# Patient Record
Sex: MALE | Race: WHITE | NOT HISPANIC OR LATINO | Employment: FULL TIME | URBAN - METROPOLITAN AREA
[De-identification: names, ages, dates, MRNs, and addresses within clinical notes are randomized per-mention and may not be internally consistent; named-entity substitution may affect disease eponyms.]

---

## 2017-02-12 ENCOUNTER — APPOINTMENT (EMERGENCY)
Dept: RADIOLOGY | Facility: HOSPITAL | Age: 22
End: 2017-02-12
Payer: COMMERCIAL

## 2017-02-12 ENCOUNTER — HOSPITAL ENCOUNTER (EMERGENCY)
Facility: HOSPITAL | Age: 22
Discharge: HOME | End: 2017-02-12
Attending: EMERGENCY MEDICINE | Admitting: EMERGENCY MEDICINE
Payer: COMMERCIAL

## 2017-02-12 VITALS
DIASTOLIC BLOOD PRESSURE: 71 MMHG | WEIGHT: 175 LBS | BODY MASS INDEX: 25.84 KG/M2 | OXYGEN SATURATION: 98 % | RESPIRATION RATE: 18 BRPM | SYSTOLIC BLOOD PRESSURE: 134 MMHG | TEMPERATURE: 97.8 F | HEART RATE: 94 BPM

## 2017-02-12 DIAGNOSIS — R10.9 ABDOMINAL PAIN: Primary | ICD-10-CM

## 2017-02-12 LAB
ALBUMIN SERPL BCP-MCNC: 3.9 G/DL (ref 3.5–5)
ALP SERPL-CCNC: 111 U/L (ref 46–116)
ALT SERPL W P-5'-P-CCNC: 47 U/L (ref 12–78)
ANION GAP SERPL CALCULATED.3IONS-SCNC: 7 MMOL/L (ref 4–13)
APTT PPP: 31 SECONDS (ref 24–36)
AST SERPL W P-5'-P-CCNC: 20 U/L (ref 5–45)
BASOPHILS # BLD AUTO: 0.11 THOUSAND/UL (ref 0–0.1)
BASOPHILS NFR MAR MANUAL: 1 % (ref 0–1)
BILIRUB SERPL-MCNC: 0.4 MG/DL (ref 0.2–1)
BUN SERPL-MCNC: 13 MG/DL (ref 5–25)
CALCIUM SERPL-MCNC: 8.8 MG/DL (ref 8.3–10.1)
CHLORIDE SERPL-SCNC: 100 MMOL/L (ref 100–108)
CO2 SERPL-SCNC: 29 MMOL/L (ref 21–32)
CREAT SERPL-MCNC: 1.09 MG/DL (ref 0.6–1.3)
EOSINOPHIL # BLD AUTO: 0.54 THOUSAND/UL (ref 0–0.61)
EOSINOPHIL NFR BLD MANUAL: 5 % (ref 0–6)
ERYTHROCYTE [DISTWIDTH] IN BLOOD BY AUTOMATED COUNT: 16.5 % (ref 11.6–15.1)
GFR SERPL CREATININE-BSD FRML MDRD: >60 ML/MIN/1.73SQ M
GLUCOSE SERPL-MCNC: 99 MG/DL (ref 65–140)
HCT VFR BLD AUTO: 42.8 % (ref 42–52)
HGB BLD-MCNC: 13 G/DL (ref 14–18)
HYPERCHROMIA BLD QL SMEAR: PRESENT
INR PPP: 0.98 (ref 0.86–1.16)
LIPASE SERPL-CCNC: 163 U/L (ref 73–393)
LYMPHOCYTES # BLD AUTO: 1.62 THOUSAND/UL (ref 0.6–4.47)
LYMPHOCYTES # BLD AUTO: 15 %
MCH RBC QN AUTO: 18.5 PG (ref 27–31)
MCHC RBC AUTO-ENTMCNC: 30.3 G/DL (ref 31.4–37.4)
MCV RBC AUTO: 61 FL (ref 82–98)
MICROCYTES BLD QL AUTO: PRESENT
MONOCYTES # BLD AUTO: 1.94 THOUSAND/UL (ref 0–1.22)
MONOCYTES NFR BLD AUTO: 18 % (ref 4–12)
NEUTS BAND NFR BLD MANUAL: 12 % (ref 0–8)
NEUTS SEG # BLD: 6.59 THOUSAND/UL (ref 1.81–6.82)
NEUTS SEG NFR BLD AUTO: 49 %
NRBC BLD AUTO-RTO: 0 /100 WBCS
PLATELET # BLD AUTO: 373 THOUSANDS/UL (ref 130–400)
PMV BLD AUTO: 7.2 FL (ref 8.9–12.7)
POIKILOCYTOSIS BLD QL SMEAR: PRESENT
POLYCHROMASIA BLD QL SMEAR: PRESENT
POTASSIUM SERPL-SCNC: 3.9 MMOL/L (ref 3.5–5.3)
PROT SERPL-MCNC: 8 G/DL (ref 6.4–8.2)
PROTHROMBIN TIME: 10.3 SECONDS (ref 9.4–11.7)
RBC # BLD AUTO: 7.01 MILLION/UL (ref 4.7–6.1)
SODIUM SERPL-SCNC: 136 MMOL/L (ref 136–145)
TOTAL CELLS COUNTED SPEC: 100
WBC # BLD AUTO: 10.8 THOUSAND/UL (ref 4.8–10.8)

## 2017-02-12 PROCEDURE — 83690 ASSAY OF LIPASE: CPT | Performed by: EMERGENCY MEDICINE

## 2017-02-12 PROCEDURE — A9270 NON-COVERED ITEM OR SERVICE: HCPCS | Performed by: EMERGENCY MEDICINE

## 2017-02-12 PROCEDURE — 85007 BL SMEAR W/DIFF WBC COUNT: CPT | Performed by: EMERGENCY MEDICINE

## 2017-02-12 PROCEDURE — 96375 TX/PRO/DX INJ NEW DRUG ADDON: CPT

## 2017-02-12 PROCEDURE — 36415 COLL VENOUS BLD VENIPUNCTURE: CPT | Performed by: EMERGENCY MEDICINE

## 2017-02-12 PROCEDURE — 80053 COMPREHEN METABOLIC PANEL: CPT | Performed by: EMERGENCY MEDICINE

## 2017-02-12 PROCEDURE — 99284 EMERGENCY DEPT VISIT MOD MDM: CPT

## 2017-02-12 PROCEDURE — 96361 HYDRATE IV INFUSION ADD-ON: CPT

## 2017-02-12 PROCEDURE — 96376 TX/PRO/DX INJ SAME DRUG ADON: CPT

## 2017-02-12 PROCEDURE — 85027 COMPLETE CBC AUTOMATED: CPT | Performed by: EMERGENCY MEDICINE

## 2017-02-12 PROCEDURE — 85610 PROTHROMBIN TIME: CPT | Performed by: EMERGENCY MEDICINE

## 2017-02-12 PROCEDURE — 85730 THROMBOPLASTIN TIME PARTIAL: CPT | Performed by: EMERGENCY MEDICINE

## 2017-02-12 PROCEDURE — 74177 CT ABD & PELVIS W/CONTRAST: CPT

## 2017-02-12 PROCEDURE — 96374 THER/PROPH/DIAG INJ IV PUSH: CPT

## 2017-02-12 RX ORDER — ONDANSETRON 2 MG/ML
4 INJECTION INTRAMUSCULAR; INTRAVENOUS ONCE
Status: COMPLETED | OUTPATIENT
Start: 2017-02-12 | End: 2017-02-12

## 2017-02-12 RX ORDER — OXYCODONE HYDROCHLORIDE AND ACETAMINOPHEN 5; 325 MG/1; MG/1
1 TABLET ORAL EVERY 6 HOURS PRN
Qty: 10 TABLET | Refills: 0 | Status: SHIPPED | OUTPATIENT
Start: 2017-02-12 | End: 2018-02-02 | Stop reason: ALTCHOICE

## 2017-02-12 RX ORDER — MORPHINE SULFATE 4 MG/ML
4 INJECTION, SOLUTION INTRAMUSCULAR; INTRAVENOUS ONCE
Status: COMPLETED | OUTPATIENT
Start: 2017-02-12 | End: 2017-02-12

## 2017-02-12 RX ADMIN — HYDROMORPHONE HYDROCHLORIDE 1 MG: 1 INJECTION, SOLUTION INTRAMUSCULAR; INTRAVENOUS; SUBCUTANEOUS at 16:01

## 2017-02-12 RX ADMIN — ONDANSETRON 4 MG: 2 INJECTION INTRAMUSCULAR; INTRAVENOUS at 15:18

## 2017-02-12 RX ADMIN — ONDANSETRON 4 MG: 2 INJECTION INTRAMUSCULAR; INTRAVENOUS at 16:01

## 2017-02-12 RX ADMIN — MORPHINE SULFATE 4 MG: 4 INJECTION, SOLUTION INTRAMUSCULAR; INTRAVENOUS at 15:17

## 2017-02-12 RX ADMIN — SODIUM CHLORIDE 1000 ML: 0.9 INJECTION, SOLUTION INTRAVENOUS at 15:19

## 2017-02-12 RX ADMIN — IOHEXOL: 240 INJECTION, SOLUTION INTRATHECAL; INTRAVASCULAR; INTRAVENOUS; ORAL at 15:30

## 2017-02-12 RX ADMIN — IOHEXOL 100 ML: 350 INJECTION, SOLUTION INTRAVENOUS at 17:15

## 2017-02-22 ENCOUNTER — GENERIC CONVERSION - ENCOUNTER (OUTPATIENT)
Dept: OTHER | Facility: OTHER | Age: 22
End: 2017-02-22

## 2017-06-27 ENCOUNTER — ALLSCRIPTS OFFICE VISIT (OUTPATIENT)
Dept: OTHER | Facility: OTHER | Age: 22
End: 2017-06-27

## 2017-06-27 DIAGNOSIS — Z13.6 ENCOUNTER FOR SCREENING FOR CARDIOVASCULAR DISORDERS: ICD-10-CM

## 2017-06-27 DIAGNOSIS — Z13.83 ENCOUNTER FOR SCREENING FOR RESPIRATORY DISORDER: ICD-10-CM

## 2017-06-27 DIAGNOSIS — Z02.1 ENCOUNTER FOR PRE-EMPLOYMENT EXAMINATION: ICD-10-CM

## 2017-06-27 DIAGNOSIS — Z13.89 ENCOUNTER FOR SCREENING FOR OTHER DISORDER: ICD-10-CM

## 2017-06-28 ENCOUNTER — TRANSCRIBE ORDERS (OUTPATIENT)
Dept: ADMINISTRATIVE | Facility: HOSPITAL | Age: 22
End: 2017-06-28

## 2017-06-28 ENCOUNTER — APPOINTMENT (OUTPATIENT)
Dept: LAB | Facility: HOSPITAL | Age: 22
End: 2017-06-28
Attending: FAMILY MEDICINE

## 2017-06-28 DIAGNOSIS — Z02.1 ENCOUNTER FOR PRE-EMPLOYMENT EXAMINATION: ICD-10-CM

## 2017-06-28 DIAGNOSIS — Z13.6 ENCOUNTER FOR SCREENING FOR CARDIOVASCULAR DISORDERS: ICD-10-CM

## 2017-06-28 DIAGNOSIS — Z02.1 PRE-EMPLOYMENT HEALTH SCREENING EXAMINATION: ICD-10-CM

## 2017-06-28 DIAGNOSIS — Z02.1 PRE-EMPLOYMENT HEALTH SCREENING EXAMINATION: Primary | ICD-10-CM

## 2017-06-28 DIAGNOSIS — Z13.83 ENCOUNTER FOR SCREENING FOR RESPIRATORY DISORDER: ICD-10-CM

## 2017-06-28 DIAGNOSIS — Z13.89 ENCOUNTER FOR SCREENING FOR OTHER DISORDER: ICD-10-CM

## 2017-06-28 LAB
ALBUMIN SERPL BCP-MCNC: 3.8 G/DL (ref 3.5–5)
ALP SERPL-CCNC: 76 U/L (ref 46–116)
ALT SERPL W P-5'-P-CCNC: 31 U/L (ref 12–78)
ANION GAP SERPL CALCULATED.3IONS-SCNC: 9 MMOL/L (ref 4–13)
AST SERPL W P-5'-P-CCNC: 20 U/L (ref 5–45)
BACTERIA UR QL AUTO: ABNORMAL /HPF
BILIRUB SERPL-MCNC: 0.8 MG/DL (ref 0.2–1)
BILIRUB UR QL STRIP: NEGATIVE
BUN SERPL-MCNC: 12 MG/DL (ref 5–25)
CALCIUM SERPL-MCNC: 9 MG/DL (ref 8.3–10.1)
CHLORIDE SERPL-SCNC: 103 MMOL/L (ref 100–108)
CHOLEST SERPL-MCNC: 90 MG/DL (ref 50–200)
CLARITY UR: CLEAR
CO2 SERPL-SCNC: 27 MMOL/L (ref 21–32)
COLOR UR: YELLOW
CREAT SERPL-MCNC: 1.08 MG/DL (ref 0.6–1.3)
GFR SERPL CREATININE-BSD FRML MDRD: >60 ML/MIN/1.73SQ M
GLUCOSE P FAST SERPL-MCNC: 73 MG/DL (ref 65–99)
GLUCOSE UR STRIP-MCNC: NEGATIVE MG/DL
HDLC SERPL-MCNC: 36 MG/DL (ref 40–60)
HGB UR QL STRIP.AUTO: ABNORMAL
KETONES UR STRIP-MCNC: NEGATIVE MG/DL
LDLC SERPL CALC-MCNC: 40 MG/DL (ref 0–100)
LEUKOCYTE ESTERASE UR QL STRIP: ABNORMAL
MUCOUS THREADS UR QL AUTO: ABNORMAL
NITRITE UR QL STRIP: NEGATIVE
NON-SQ EPI CELLS URNS QL MICRO: ABNORMAL /HPF
PH UR STRIP.AUTO: 6 [PH] (ref 5–9)
POTASSIUM SERPL-SCNC: 4.1 MMOL/L (ref 3.5–5.3)
PROT SERPL-MCNC: 7.5 G/DL (ref 6.4–8.2)
PROT UR STRIP-MCNC: ABNORMAL MG/DL
RBC #/AREA URNS AUTO: ABNORMAL /HPF
SODIUM SERPL-SCNC: 139 MMOL/L (ref 136–145)
SP GR UR STRIP.AUTO: 1.02 (ref 1–1.03)
TRIGL SERPL-MCNC: 72 MG/DL
URATE SERPL-MCNC: 8.2 MG/DL (ref 4.2–8)
UROBILINOGEN UR QL STRIP.AUTO: 0.2 E.U./DL
WBC #/AREA URNS AUTO: ABNORMAL /HPF

## 2017-06-28 PROCEDURE — 80061 LIPID PANEL: CPT

## 2017-06-28 PROCEDURE — 81001 URINALYSIS AUTO W/SCOPE: CPT

## 2017-06-28 PROCEDURE — 80307 DRUG TEST PRSMV CHEM ANLYZR: CPT

## 2017-06-28 PROCEDURE — 80053 COMPREHEN METABOLIC PANEL: CPT

## 2017-06-28 PROCEDURE — 84550 ASSAY OF BLOOD/URIC ACID: CPT

## 2017-07-01 ENCOUNTER — GENERIC CONVERSION - ENCOUNTER (OUTPATIENT)
Dept: OTHER | Facility: OTHER | Age: 22
End: 2017-07-01

## 2017-07-01 LAB
AMPHETAMINES SERPL QL SCN: NEGATIVE NG/ML
BARBITURATES SERPL QL SCN: NEGATIVE UG/ML
BENZODIAZ SPEC QL: NEGATIVE NG/ML
CANNABINOIDS SERPL QL SCN: NEGATIVE NG/ML
COCAINE+BZE SERPL QL SCN: NEGATIVE NG/ML
OPIATES SERPL QL SCN: NEGATIVE NG/ML
OXYCODONE+OXYMORPHONE SERPLBLD QL SCN: NEGATIVE NG/ML
PCP SERPL QL SCN: NEGATIVE NG/ML

## 2018-01-10 NOTE — PROGRESS NOTES
History of Present Illness  Care Coordination Encounter Information:   Type of Encounter: Telephonic   Last Office Visit: 9/28/16   Spoke to Patient  Care Coordination  Nurse Sandy Gomes:   The reason for call is to discuss outreach for follow up/needed services and coordination of meeting care plan treatment goals  I reached out to [facility] as per the [facility] list again  He missed the PCP appointment I had scheduled for him  He was at the surgeon office, on that day, with a clostridium difficile infection  Currently taking Vancomycin and he is unsure of the dose  He recently had CT scan of small intestine from his gastroenterologist  He will receive results 6/15/16  He is having increased bowel movements  He is able to eat only one meal of chicken and rice per day  He has seen a nutritionist in the past who recommended blended vegetable drinks which he is doing  I have again scheduled a PCP appointment for him  Active Problems    1  Anxiety disorder (300 00) (F41 9)   2  Colon cancer screening (V76 51) (Z12 11)   3  Headache (784 0) (R51)   4  Immunization due (V05 9) (Z23)   5  Persistent insomnia (307 42) (G47 00)   6  Screening for lipid disorders (V77 91) (Z13 220)   7  Ulcerative pancolitis (556 6) (K51 90)   8  Well adult on routine health check (V70 0) (Z00 00)    Past Medical History    1  History of Acute upper respiratory infection (465 9) (J06 9)   2  History of Cellulitis (682 9) (L03 90)   3  History of Fatigue (780 79) (R53 83)   4  History of acute pancreatitis (V12 79) (Z87 19)    Family History  Father    1  Family history of hypertension (V17 49) (Z82 49)    Social History    · Never A Smoker    Current Meds    1  Ciprofloxacin HCl - 500 MG Oral Tablet; TAKE 1 TABLET EVERY 12 HOURS DAILY; Therapy: [id number] to Recorded    Allergies    1  No Known Drug Allergies    End of Encounter Meds    1  Ciprofloxacin HCl - 500 MG Oral Tablet; TAKE 1 TABLET EVERY 12 HOURS DAILY;    Therapy: 71Vua9121 to Recorded    Future Appointments    Date/Time Provider Specialty Site   06/21/2016 11:00 AM [place] , [facility] [address]     Signatures   Electronically signed by :  Savannah Rodrigues RN; Jun 14 2016 11:07AM EST                       (Author)

## 2018-01-12 NOTE — PROGRESS NOTES
History of Present Illness  Care Coordination Encounter Information:   Type of Encounter: Telephonic   Contact: Initial Contact   Last Office Visit: 9/28/15   Spoke to Patient   I reached out to [facility] at the request of the Care Analyst and the [facility] list  [facility] is a 23year old male who has had his large colon removed for pan colitis  He had his surgery performed in [place] and continues to have difficulties  He has daily abdominal pain which he describes as "cramping" and "annoying"  He takes Metamucil, on a daily basis, as he is unable to have a bowel movement without it  With the Metamucil, however, he has diarrhea  He continues with daily Cipro, 500mg  twice daily  He has been following a gluten free diet for 2 months under the direction of a   We discussed grain free diets as well for him to research  He has been following with a gastroenterologist, Dr Mary Stacy, from [place], [place]  He has had hospitalizations in 2016 in [facility] and in [facility]  We talked about the importance of keeping the primary care physician informed and he has scheduled an appointment in May after school is over  Active Problems    1  Anxiety disorder (300 00) (F41 9)   2  Encounter for screening for malignant neoplasm of colon (V76 51) (Z12 11)   3  Headache (784 0) (R51)   4  Persistent insomnia (307 42) (G47 00)   5  Ulcerative pancolitis (556 6) (K51 90)   6  Well adult on routine health check (V70 0) (Z00 00)    Past Medical History    1  History of Acute upper respiratory infection (465 9) (J06 9)   2  History of Cellulitis (682 9) (L03 90)   3  History of Fatigue (780 79) (R53 83)   4  History of acute pancreatitis (V12 79) (Z87 19)    Family History    1  Family history of hypertension (V17 49) (Z82 49)    Social History    · Never A Smoker    Current Meds    1  Ciprofloxacin HCl - 500 MG Oral Tablet; TAKE 1 TABLET EVERY 12 HOURS DAILY; Therapy: [id number] to Recorded    Allergies    1   No Known Drug Allergies    End of Encounter Meds    1  Ciprofloxacin HCl - 500 MG Oral Tablet; TAKE 1 TABLET EVERY 12 HOURS DAILY; Therapy: 11Mht1555 to Recorded    Future Appointments    Date/Time Provider Specialty Site   05/16/2016 11:00 AM Brina Sesay, [address] [place] [address]     Signatures   Electronically signed by : Shan Moreno RN; Apr 11 2016  1:58PM EST                       (Author)    Electronically signed by :  Shan Moreno RN; Jun 14 2016 10:49AM EST                       (Author)

## 2018-01-12 NOTE — RESULT NOTES
Verified Results  (1) DRUG SCREEN PANEL 1, SERUM 28Jun2017 08:32AM Mark Jimenes Order Number: [id number]     Test Name Result Flag Reference   AMPHETAMINE Negative ng/mL  Cutoff:50   BARBITURATES Negative ug/mL  Cutoff:0 1   BENZODIAZEPAM Negative ng/mL  Cutoff:20   CANNABINOID Negative ng/mL  Cutoff:5   COCAINE Negative ng/mL  Cutoff:25   OPIATES Negative ng/mL  Cutoff:5   PCP Negative ng/mL  Cutoff:8   OXYCODONE Negative ng/mL  Cutoff:5   This test was developed and its performance characteristics  determined by [facility]   It has not been cleared or approved  by the Food and Drug Administration      Performed at:  [address], [address]  : Ant Thorne MD, Phone:  [phone]

## 2018-01-12 NOTE — MISCELLANEOUS
Provider Comments  Provider Comments:   patient was a no show for visit today, called and the voicemail was full, could not leave a message        Signatures   Electronically signed by : Tate Gil DO; May 16 2016 10:01PM EST                       (Author)

## 2018-01-16 NOTE — PROGRESS NOTES
Assessment    1  Pre-employment examination (V70 5) (Z02 1)    Plan  Pre-employment examination    · (1) COMPREHENSIVE METABOLIC PANEL; Status:Active; Requested IGM:[id number];    · (1) DRUG SCREEN PANEL 1, SERUM; Status:Active; Requested [id number];    · (1) URIC ACID; Status:Active; Requested VUF:[id number];    · (1) URINALYSIS (will reflex a microscopy if leukocytes, occult blood, protein or nitrites  are not within normal limits); Status:Active; Requested [id number];    · EKG/ECG- POC; Status:Complete;   Done: 16SYS8105 12:00AM   · SCREEN AUDIOGRAM- POC; Status:Resulted - Requires Verification;   Done:  92ZSH4208 12:00AM  Pre-employment examination, Screening for cardiovascular, respiratory, and  genitourinary diseases    · (1) LIPID PANEL FASTING W DIRECT LDL REFLEX; Status:Active; Requested  [id number]; History of Present Illness  HM, Adult Male: 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  General Health: The patient's health since the last visit is described as good  Immunizations status: up to date  Lifestyle:  He consumes a diverse and healthy diet  He does not have any weight concerns  He exercises regularly  He does not use tobacco  He consumes alcohol  He reports occasional alcohol use  avoid dairy  Screening: Additional History:  sees Dr Dirk Saucedo, in [facility]  he is aware and has given separate clearance, no physical restrictions per pt  able to do everything described on forms  HPI: no po meds  has SBS  on metamucil  bm 4-6x/d      Review of Systems    Cardiovascular: no chest pain  Respiratory: no shortness of breath  Gastrointestinal: liquidy, no incontinence  Genitourinary: no incontinence  Neurological: no dizziness and no fainting  Active Problems    1  Acne (706 1) (L70 9)   2  BMI 26 0-26 9,adult (V85 22) (Z68 26)   3  Colon cancer screening (V76 51) (Z12 11)   4   Immunization due (V05 9) (Z23)   5  Pre-employment examination (V70 5) (Z02 1)   6  Screening for cardiovascular, respiratory, and genitourinary diseases   (V81 2,V81 4,V81 6) (Z13 6,Z13 83,Z13 89)   7  Screening for diabetes mellitus (DM) (V77 1) (Z13 1)   8  Ulcerative colitis (556 9) (K51 90)   9  Well adult on routine health check (V70 0) (Z00 00)    Past Medical History    · Acute frontal sinusitis (461 1) (J01 10)   · History of Acute upper respiratory infection (465 9) (J06 9)   · History of Cellulitis (682 9) (L03 90)   · History of Fatigue (780 79) (R53 83)   · History of acute pancreatitis (V12 79) (Z87 19)   · History of anxiety disorder (V11 8) (Z86 59)   · History of fatigue (V13 89) (W23 523)   · History of headache (V13 89) (Z87 898)   · History of Persistent insomnia (307 42) (G47 00)    Surgical History    · History of Total Proctocolectomy    Family History  Father    · Family history of hypertension (V17 49) (Z82 49)    Social History    · Never A Smoker    Allergies    1  mesalamine    Vitals   Recorded: 27Jun2017 03:06PM   Temperature 97 9 F   Heart Rate 66   Respiration 16   Systolic 131   Diastolic 72   Height 5 ft 8 in   Weight 177 lb    BMI Calculated 26 91   BSA Calculated 1 95     Physical Exam    Constitutional   General appearance: No acute distress, well appearing and well nourished  Eyes   Conjunctiva and lids: No erythema, swelling or discharge  Pupils and irises: Equal, round, reactive to light  Ophthalmoscopic examination: Normal fundi and optic discs  Ears, Nose, Mouth, and Throat   External inspection of ears and nose: Normal     Otoscopic examination: Tympanic membranes translucent with normal light reflex  Canals patent without erythema  Hearing: Normal     Nasal mucosa, septum, and turbinates: Normal without edema or erythema  Lips, teeth, and gums: Normal, good dentition  Oropharynx: Normal with no erythema, edema, exudate or lesions      Neck   Neck: Supple, symmetric, trachea midline, no masses  Thyroid: Normal, no thyromegaly  Pulmonary   Respiratory effort: No increased work of breathing or signs of respiratory distress  Auscultation of lungs: Clear to auscultation  Cardiovascular   Auscultation of heart: Normal rate and rhythm, normal S1 and S2, no murmurs  Carotid pulses: 2+ bilaterally  Pedal pulses: 2+ bilaterally  Examination of extremities for edema and/or varicosities: Normal     Chest   Chest: Normal     Abdomen   Abdomen: Non-tender, no masses  Liver and spleen: No hepatomegaly or splenomegaly  Examination for hernias: No hernias appreciated  Anus, perineum, and rectum: Normal sphincter tone, no masses, no prolapse  Genitourinary   Scrotal contents: Normal testes, no masses  Penis: Normal, no lesions  Lymphatic   Palpation of lymph nodes in neck: No lymphadenopathy  Palpation of lymph nodes in groin: No lymphadenopathy  Musculoskeletal   Gait and station: Normal     Inspection/palpation of digits and nails: Normal without clubbing or cyanosis  Inspection/palpation of joints, bones, and muscles: Normal     Range of motion: Normal     Stability: Normal     Muscle strength/tone: Normal     Skin   Skin and subcutaneous tissue: Normal without rashes or lesions  abdominal scars  Palpation of skin and subcutaneous tissue: Normal turgor  Neurologic   Reflexes: 2+ and symmetric  Sensation: No sensory loss      Psychiatric   Judgment and insight: Normal     Mood and affect: Normal        Procedure    Procedure:   Results: 20/20 in both eyes with corrective device, 20/30 in the right eye with corrective device, 20/30 in the left eye with corrective device      Signatures   Electronically signed by : Jeanna Parikh DO; Jun 27 2017 10:35PM EST                       (Author)

## 2018-01-18 NOTE — PROGRESS NOTES
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 and will see a new surgeon out of [facility] on 2/27/17  He did notice some improvement with a gluten free diet  I have recommended a grain free diet once again  He denies having any pain medication at this time but is hopeful to get some from the surgeon  I have provided my contact information to him with a reminder he may call if I can be of help to him  Active Problems    1  Acne (706 1) (L70 9)   2  BMI 26 0-26 9,adult (V85 22) (Z68 26)   3  Colon cancer screening (V76 51) (Z12 11)   4  Immunization due (V05 9) (Z23)   5  Screening for diabetes mellitus (DM) (V77 1) (Z13 1)   6  Screening for lipid disorders (V77 91) (Z13 220)   7  Ulcerative colitis (556 9) (K51 90)   8  Well adult on routine health check (V70 0) (Z00 00)    Past Medical History    1  Acute frontal sinusitis (461 1) (J01 10)   2  History of Acute upper respiratory infection (465 9) (J06 9)   3  History of Cellulitis (682 9) (L03 90)   4  History of Fatigue (780 79) (R53 83)   5  History of acute pancreatitis (V12 79) (Z87 19)   6  History of anxiety disorder (V11 8) (Z86 59)   7  History of fatigue (V13 89) (Z87 898)   8  History of headache (V13 89) (Z87 898)   9  History of Persistent insomnia (307 42) (G47 00)    Surgical History    1  History of Total Proctocolectomy    Family History  Father    1  Family history of hypertension (V17 49) (Z82 49)    Social History    · Never A Smoker    Current Meds    1   Florastor 250 MG Oral Capsule; TAKE 1 CAPSULE TWICE DAILY; Therapy: 83MBG3092 to (Evaluate:16Oct2016)  Requested for: 41YIA3788; Last   Rx:06Oct2016 Ordered    2  Acetaminophen-Codeine #3 300-30 MG Oral Tablet; TAKE 1 TABLET BY MOUTH TWICE   A DAY IF NEEDED FOR PAIN;   Therapy: 13LIQ1420 to (Evaluate:16Oct2016); Last Rx:06Oct2016 Ordered    Allergies    1  mesalamine    End of Encounter Meds    1  Florastor 250 MG Oral Capsule; TAKE 1 CAPSULE TWICE DAILY; Therapy: 11SSI3728 to (Evaluate:16Oct2016)  Requested for: 60PXM2603; Last   Rx:06Oct2016 Ordered    2  Acetaminophen-Codeine #3 300-30 MG Oral Tablet; TAKE 1 TABLET BY MOUTH TWICE   A DAY IF NEEDED FOR PAIN;   Therapy: 86UCJ4713 to (Evaluate:16Oct2016); Last Rx:06Oct2016 Ordered    Patient Care Team    Care Team Member Role Specialty Office Number   Brittnee Lockhart, [address] [facility] [phone]     Signatures   Electronically signed by :  Celine Nolan RN; Feb 22 2017  3:00PM EST                       (Author)

## 2018-01-22 VITALS
TEMPERATURE: 97.9 F | RESPIRATION RATE: 16 BRPM | DIASTOLIC BLOOD PRESSURE: 72 MMHG | BODY MASS INDEX: 26.83 KG/M2 | WEIGHT: 177 LBS | SYSTOLIC BLOOD PRESSURE: 126 MMHG | HEIGHT: 68 IN | HEART RATE: 66 BPM

## 2018-02-02 ENCOUNTER — OFFICE VISIT (OUTPATIENT)
Dept: FAMILY MEDICINE CLINIC | Facility: CLINIC | Age: 23
End: 2018-02-02
Payer: COMMERCIAL

## 2018-02-02 VITALS
DIASTOLIC BLOOD PRESSURE: 76 MMHG | SYSTOLIC BLOOD PRESSURE: 110 MMHG | HEART RATE: 72 BPM | RESPIRATION RATE: 18 BRPM | TEMPERATURE: 97.3 F | HEIGHT: 69 IN | BODY MASS INDEX: 25.92 KG/M2 | WEIGHT: 175 LBS

## 2018-02-02 DIAGNOSIS — K51.019 ULCERATIVE PANCOLITIS WITH COMPLICATION (HCC): ICD-10-CM

## 2018-02-02 DIAGNOSIS — R53.83 OTHER FATIGUE: Primary | ICD-10-CM

## 2018-02-02 PROCEDURE — 99214 OFFICE O/P EST MOD 30 MIN: CPT | Performed by: FAMILY MEDICINE

## 2018-02-02 PROCEDURE — 3725F SCREEN DEPRESSION PERFORMED: CPT | Performed by: FAMILY MEDICINE

## 2018-02-02 NOTE — PROGRESS NOTES
Assessment/Plan:           Diagnoses and all orders for this visit:    Other fatigue  -     DEBORA Screen w/ Reflex to Titer/Pattern; Future  -     CBC and differential; Future  -     Comprehensive metabolic panel; Future  -     Lipid Panel with Direct LDL reflex; Future  -     Lyme Antibody Profile with reflex to WB; Future  -     TSH, 3rd generation; Future  -     Testosterone; Future  -     Vitamin B12; Future  -     Iron Saturation %; Future  -     Vitamin D 25 hydroxy; Future  -     Sedimentation rate, automated; Future  -     Mononucleosis screen; Future          UC stable  No mood issues    Fatigue r/o metabolic/nutrition/autoimmune/infection/oncologic    No Follow-up on file  Subjective:      Patient ID: Angelika Velasco is a 23 y o  male  Chief Complaint   Patient presents with    Fatigue       Not on any rx meds  UC is stable  No colon, small portion of distal small intestine removed also  No mvi  No supplements, some protein powder  Healthy eater  Mushy bm  Urine ok  No rashes  No tick exposures known  Low energy/endurance  Sleeping excess w/o relief  Goes to gym  Fatigue easily  Cardio is ok  No blood in stool  No GI f/u planned  No nocturia  Not depressed  No autoimmune conditions in family      Fatigue   Associated symptoms include fatigue  Pertinent negatives include no chest pain  The following portions of the patient's history were reviewed and updated as appropriate: allergies, current medications, past family history, past medical history, past social history, past surgical history and problem list     Review of Systems   Constitutional: Positive for fatigue  Respiratory: Negative for shortness of breath  Cardiovascular: Negative for chest pain  Current Outpatient Prescriptions   Medication Sig Dispense Refill    Psyllium (UMA-RUL PSYLLIUM SEED HUSKS) 500 MG CAPS Take by mouth       No current facility-administered medications for this visit  Objective:    /76   Pulse 72   Temp (!) 97 3 °F (36 3 °C)   Resp 18   Ht 5' 9" (1 753 m)   Wt 79 4 kg (175 lb)   BMI 25 84 kg/m²        Physical Exam   Constitutional: He appears well-developed  HENT:   Head: Normocephalic  Eyes: Conjunctivae are normal    Neck: Neck supple  Cardiovascular: Normal rate and intact distal pulses  Pulmonary/Chest: Effort normal  No respiratory distress  Abdominal: Soft  Musculoskeletal: He exhibits no edema or deformity  Neurological: He is alert  Skin: Skin is warm and dry  Psychiatric: His behavior is normal  Thought content normal    Nursing note and vitals reviewed        No adenopathy neck/axillae/groin       Maya Suarez DO

## 2018-02-09 LAB
25(OH)D3+25(OH)D2 SERPL-MCNC: 17.5 NG/ML (ref 30–100)
ALBUMIN SERPL-MCNC: 4.2 G/DL (ref 3.5–5.5)
ALBUMIN/GLOB SERPL: 1.6 {RATIO} (ref 1.2–2.2)
ALP SERPL-CCNC: 86 IU/L (ref 39–117)
ALT SERPL-CCNC: 19 IU/L (ref 0–44)
AMBIG ABBREV DEFAULT: NORMAL
ANA TITR SER IF: NEGATIVE {TITER}
AST SERPL-CCNC: 21 IU/L (ref 0–40)
B BURGDOR IGG+IGM SER-ACNC: <0.91 ISR (ref 0–0.9)
B BURGDOR IGM SER IA-ACNC: <0.8 INDEX (ref 0–0.79)
BASOPHILS # BLD AUTO: 0.1 X10E3/UL (ref 0–0.2)
BASOPHILS NFR BLD AUTO: 1 %
BILIRUB SERPL-MCNC: 0.7 MG/DL (ref 0–1.2)
BUN SERPL-MCNC: 10 MG/DL (ref 6–20)
BUN/CREAT SERPL: 9 (ref 9–20)
CALCIUM SERPL-MCNC: 9.5 MG/DL (ref 8.7–10.2)
CHLORIDE SERPL-SCNC: 96 MMOL/L (ref 96–106)
CHOLEST SERPL-MCNC: 133 MG/DL (ref 100–199)
CO2 SERPL-SCNC: 26 MMOL/L (ref 18–29)
CREAT SERPL-MCNC: 1.06 MG/DL (ref 0.76–1.27)
EOSINOPHIL # BLD AUTO: 0.2 X10E3/UL (ref 0–0.4)
EOSINOPHIL NFR BLD AUTO: 2 %
ERYTHROCYTE [DISTWIDTH] IN BLOOD BY AUTOMATED COUNT: 17.2 % (ref 12.3–15.4)
ERYTHROCYTE [SEDIMENTATION RATE] IN BLOOD BY WESTERGREN METHOD: 9 MM/HR (ref 0–15)
GLOBULIN SER-MCNC: 2.7 G/DL (ref 1.5–4.5)
GLUCOSE SERPL-MCNC: 78 MG/DL (ref 65–99)
HCT VFR BLD AUTO: 40 % (ref 37.5–51)
HDLC SERPL-MCNC: 57 MG/DL
HETEROPH AB SER QL LA: NEGATIVE
HGB BLD-MCNC: 12.1 G/DL (ref 13–17.7)
IMM GRANULOCYTES # BLD: 0 X10E3/UL (ref 0–0.1)
IMM GRANULOCYTES NFR BLD: 0 %
IRON SATN MFR SERPL: 34 % (ref 15–55)
IRON SERPL-MCNC: 97 UG/DL (ref 38–169)
LDLC SERPL CALC-MCNC: 61 MG/DL (ref 0–99)
LYMPHOCYTES # BLD AUTO: 2.4 X10E3/UL (ref 0.7–3.1)
LYMPHOCYTES NFR BLD AUTO: 23 %
MCH RBC QN AUTO: 19.3 PG (ref 26.6–33)
MCHC RBC AUTO-ENTMCNC: 30.3 G/DL (ref 31.5–35.7)
MCV RBC AUTO: 64 FL (ref 79–97)
MICRODELETION SYND BLD/T FISH: NORMAL
MONOCYTES # BLD AUTO: 1.1 X10E3/UL (ref 0.1–0.9)
MONOCYTES NFR BLD AUTO: 11 %
MORPHOLOGY BLD-IMP: ABNORMAL
NEUTROPHILS # BLD AUTO: 6.4 X10E3/UL (ref 1.4–7)
NEUTROPHILS NFR BLD AUTO: 63 %
PLATELET # BLD AUTO: 413 X10E3/UL (ref 150–379)
POTASSIUM SERPL-SCNC: 3.9 MMOL/L (ref 3.5–5.2)
PROT SERPL-MCNC: 6.9 G/DL (ref 6–8.5)
RBC # BLD AUTO: 6.26 X10E6/UL (ref 4.14–5.8)
SL AMB EGFR AFRICAN AMERICAN: 113 ML/MIN/1.73
SL AMB EGFR NON AFRICAN AMERICAN: 98 ML/MIN/1.73
SODIUM SERPL-SCNC: 137 MMOL/L (ref 134–144)
TESTOST SERPL-MCNC: 495 NG/DL (ref 264–916)
TIBC SERPL-MCNC: 282 UG/DL (ref 250–450)
TRIGL SERPL-MCNC: 76 MG/DL (ref 0–149)
TSH SERPL DL<=0.005 MIU/L-ACNC: 5.68 UIU/ML (ref 0.45–4.5)
UIBC SERPL-MCNC: 185 UG/DL (ref 111–343)
VIT B12 SERPL-MCNC: 362 PG/ML (ref 232–1245)
WBC # BLD AUTO: 10.1 X10E3/UL (ref 3.4–10.8)

## 2018-02-20 ENCOUNTER — TELEPHONE (OUTPATIENT)
Dept: FAMILY MEDICINE CLINIC | Facility: CLINIC | Age: 23
End: 2018-02-20

## 2018-09-06 ENCOUNTER — HOSPITAL ENCOUNTER (EMERGENCY)
Facility: HOSPITAL | Age: 23
Discharge: HOME | End: 2018-09-06
Attending: EMERGENCY MEDICINE | Admitting: EMERGENCY MEDICINE
Payer: COMMERCIAL

## 2018-09-06 VITALS
TEMPERATURE: 98.2 F | SYSTOLIC BLOOD PRESSURE: 124 MMHG | DIASTOLIC BLOOD PRESSURE: 81 MMHG | RESPIRATION RATE: 21 BRPM | OXYGEN SATURATION: 98 % | HEART RATE: 92 BPM

## 2018-09-06 DIAGNOSIS — T67.01XA HEAT STROKE: Primary | ICD-10-CM

## 2018-09-06 LAB
ALBUMIN SERPL BCP-MCNC: 4.3 G/DL (ref 3.5–5)
ALP SERPL-CCNC: 104 U/L (ref 46–116)
ALT SERPL W P-5'-P-CCNC: 64 U/L (ref 12–78)
ANION GAP SERPL CALCULATED.3IONS-SCNC: 10 MMOL/L (ref 4–13)
AST SERPL W P-5'-P-CCNC: 40 U/L (ref 5–45)
BASOPHILS # BLD AUTO: 0.11 THOUSANDS/ΜL (ref 0–0.1)
BASOPHILS NFR BLD AUTO: 1 % (ref 0–1)
BILIRUB SERPL-MCNC: 1.1 MG/DL (ref 0.2–1)
BUN SERPL-MCNC: 12 MG/DL (ref 5–25)
CALCIUM SERPL-MCNC: 9.2 MG/DL (ref 8.3–10.1)
CHLORIDE SERPL-SCNC: 99 MMOL/L (ref 100–108)
CK MB SERPL-MCNC: 1.3 NG/ML (ref 0–5)
CK MB SERPL-MCNC: <1 % (ref 0–2.5)
CK SERPL-CCNC: 202 U/L (ref 39–308)
CO2 SERPL-SCNC: 24 MMOL/L (ref 21–32)
CREAT SERPL-MCNC: 1.43 MG/DL (ref 0.6–1.3)
EOSINOPHIL # BLD AUTO: 0.02 THOUSAND/ΜL (ref 0–0.61)
EOSINOPHIL NFR BLD AUTO: 0 % (ref 0–6)
ERYTHROCYTE [DISTWIDTH] IN BLOOD BY AUTOMATED COUNT: 18.4 % (ref 11.6–15.1)
GFR SERPL CREATININE-BSD FRML MDRD: 68 ML/MIN/1.73SQ M
GLUCOSE SERPL-MCNC: 95 MG/DL (ref 65–140)
HCT VFR BLD AUTO: 43.2 % (ref 36.5–49.3)
HGB BLD-MCNC: 12.8 G/DL (ref 12–17)
IMM GRANULOCYTES # BLD AUTO: 0.07 THOUSAND/UL (ref 0–0.2)
IMM GRANULOCYTES NFR BLD AUTO: 0 % (ref 0–2)
LACTATE SERPL-SCNC: 2 MMOL/L (ref 0.5–2)
LIPASE SERPL-CCNC: 112 U/L (ref 73–393)
LYMPHOCYTES # BLD AUTO: 2.09 THOUSANDS/ΜL (ref 0.6–4.47)
LYMPHOCYTES NFR BLD AUTO: 13 % (ref 14–44)
MCH RBC QN AUTO: 18.7 PG (ref 26.8–34.3)
MCHC RBC AUTO-ENTMCNC: 29.6 G/DL (ref 31.4–37.4)
MCV RBC AUTO: 63 FL (ref 82–98)
MONOCYTES # BLD AUTO: 1.38 THOUSAND/ΜL (ref 0.17–1.22)
MONOCYTES NFR BLD AUTO: 8 % (ref 4–12)
NEUTROPHILS # BLD AUTO: 12.87 THOUSANDS/ΜL (ref 1.85–7.62)
NEUTS SEG NFR BLD AUTO: 78 % (ref 43–75)
NRBC BLD AUTO-RTO: 0 /100 WBCS
PLATELET # BLD AUTO: 467 THOUSANDS/UL (ref 149–390)
PMV BLD AUTO: 8.9 FL (ref 8.9–12.7)
POTASSIUM SERPL-SCNC: 4.2 MMOL/L (ref 3.5–5.3)
PROT SERPL-MCNC: 8.2 G/DL (ref 6.4–8.2)
RBC # BLD AUTO: 6.83 MILLION/UL (ref 3.88–5.62)
SODIUM SERPL-SCNC: 133 MMOL/L (ref 136–145)
WBC # BLD AUTO: 16.54 THOUSAND/UL (ref 4.31–10.16)

## 2018-09-06 PROCEDURE — 83690 ASSAY OF LIPASE: CPT | Performed by: EMERGENCY MEDICINE

## 2018-09-06 PROCEDURE — 85025 COMPLETE CBC W/AUTO DIFF WBC: CPT | Performed by: EMERGENCY MEDICINE

## 2018-09-06 PROCEDURE — 96375 TX/PRO/DX INJ NEW DRUG ADDON: CPT

## 2018-09-06 PROCEDURE — 82550 ASSAY OF CK (CPK): CPT | Performed by: EMERGENCY MEDICINE

## 2018-09-06 PROCEDURE — 99284 EMERGENCY DEPT VISIT MOD MDM: CPT

## 2018-09-06 PROCEDURE — 93005 ELECTROCARDIOGRAM TRACING: CPT

## 2018-09-06 PROCEDURE — 36415 COLL VENOUS BLD VENIPUNCTURE: CPT | Performed by: EMERGENCY MEDICINE

## 2018-09-06 PROCEDURE — 96361 HYDRATE IV INFUSION ADD-ON: CPT

## 2018-09-06 PROCEDURE — 96374 THER/PROPH/DIAG INJ IV PUSH: CPT

## 2018-09-06 PROCEDURE — 82553 CREATINE MB FRACTION: CPT | Performed by: EMERGENCY MEDICINE

## 2018-09-06 PROCEDURE — 83605 ASSAY OF LACTIC ACID: CPT | Performed by: EMERGENCY MEDICINE

## 2018-09-06 PROCEDURE — 80053 COMPREHEN METABOLIC PANEL: CPT | Performed by: EMERGENCY MEDICINE

## 2018-09-06 RX ORDER — ONDANSETRON 2 MG/ML
INJECTION INTRAMUSCULAR; INTRAVENOUS
Status: COMPLETED
Start: 2018-09-06 | End: 2018-09-06

## 2018-09-06 RX ORDER — ONDANSETRON 2 MG/ML
4 INJECTION INTRAMUSCULAR; INTRAVENOUS ONCE
Status: COMPLETED | OUTPATIENT
Start: 2018-09-06 | End: 2018-09-06

## 2018-09-06 RX ORDER — ONDANSETRON 2 MG/ML
4 INJECTION INTRAMUSCULAR; INTRAVENOUS ONCE
Status: DISCONTINUED | OUTPATIENT
Start: 2018-09-06 | End: 2018-09-06

## 2018-09-06 RX ADMIN — FAMOTIDINE 20 MG: 10 INJECTION, SOLUTION INTRAVENOUS at 14:38

## 2018-09-06 RX ADMIN — ONDANSETRON 4 MG: 2 INJECTION INTRAMUSCULAR; INTRAVENOUS at 14:20

## 2018-09-06 RX ADMIN — SODIUM CHLORIDE 2000 ML: 0.9 INJECTION, SOLUTION INTRAVENOUS at 14:28

## 2018-09-06 NOTE — DISCHARGE INSTRUCTIONS
Your kidney function was slightly abnormal (Creatinine = 1 4)  Please ask your doctor about repeat blood work in 1-2 weeks  Drink lots of fluids  If your abdominal pain has not resolved by tomorrow, or if it worsens tonight, or if you have a black or red / bloody bowel movement, return immediately to the ER  Heatstroke   WHAT YOU NEED TO KNOW:   Heatstroke is when your body severely overheats  Heatstroke happens when you do intense physical activity in hot conditions without drinking enough liquids  Normally, the body has a cooling system that is controlled by the brain  The cooling system adjusts to hot conditions and lowers your body temperature by producing sweat  With heatstroke, the body's cooling system is not working well and results in an increased body temperature  DISCHARGE INSTRUCTIONS:   Follow up with your healthcare provider as directed:  Write down your questions so you remember to ask them during your visits  First aid for heatstroke:   · Move to an air-conditioned location or a cool, shady area and lie down  Raise your legs above the level of your heart  · Drink cold liquid, such as water or a sports drink  · Mist yourself with cold water or pour cool water on your head, neck, and clothes  · Apply ice packs on your neck, armpits, and groin  · Loosen or remove as many clothes as possible  · Have someone call 911 immediately for medical assistance  Prevent heatstroke:   · Wear lightweight, loose, and light-colored clothing  · Protect your head and neck with a hat or umbrella when you are outdoors  · Drink lots of water or sports drinks  Avoid alcohol  · Eat salty foods, such as salted crackers and salted pretzels  · Limit your activities during the hottest time of the day  This is usually late morning through early afternoon  · Use air conditioners or fans and have enough proper ventilation   If there is no air conditioning available, keep your windows open so air can circulate  · Never leave children alone inside cars, especially during hot weather  Contact your healthcare provider if:   · Your skin is red and dry  · You have muscle cramps or twitching  · You have nausea and vomiting  · You have numbness or prickling feeling in your arms or legs  · You have questions or concerns about your condition or care  Return to the emergency department if:   · Your temperature is 104°F (40°C) or higher  · You cannot stop vomiting  · You feel faint, dizzy, weak, or tired  · You are confused or cannot think clearly  · You cannot move your arms and legs  · You breathe fast or feel like your heart is beating faster than normal   © 2017 [facility] Information is for End User's use only and may not be sold, redistributed or otherwise used for commercial purposes  All illustrations and images included in CareNotes® are the copyrighted property of A D A M , Inc  or Rafal Koch  The above information is an  only  It is not intended as medical advice for individual conditions or treatments  Talk to your doctor, nurse or pharmacist before following any medical regimen to see if it is safe and effective for you

## 2018-09-06 NOTE — ED PROVIDER NOTES
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 Taking? psyllium (METAMUCIL) 58 6 % powder   Yes Yes   Sig: Take 1 packet by mouth 3 (three) times a day      Facility-Administered Medications: None       Past Medical History:   Diagnosis Date    Colitis     Pancreatitis        Past Surgical History:   Procedure Laterality Date    TOTAL COLECTOMY         History reviewed  No pertinent family history  I have reviewed and agree with the history as documented  Social History   Substance Use Topics    Smoking status: Never Smoker    Smokeless tobacco: Never Used    Alcohol use Yes        Review of Systems   Constitutional: Negative for fever  Respiratory: Negative for cough and shortness of breath  Gastrointestinal: Positive for abdominal pain  Musculoskeletal: Negative for back pain  Neurological: Negative for headaches  All other systems reviewed and are negative  Physical Exam  Physical Exam   Constitutional: He is oriented to person, place, and time  He appears well-developed  HENT:   Mouth/Throat: Oropharynx is clear and moist    Eyes: Conjunctivae are normal    Neck: Neck supple  Cardiovascular: Regular rhythm  tachycardic   Pulmonary/Chest: Effort normal and breath sounds normal  He has no wheezes   He has no rales    Abdominal: Soft  Bowel sounds are normal    Mild epigastric   Musculoskeletal: He exhibits no edema  Neurological: He is alert and oriented to person, place, and time  Skin: Skin is warm and dry  diaphoretic   Psychiatric: He has a normal mood and affect  Vitals reviewed  Vital Signs  ED Triage Vitals [09/06/18 1422]   Temperature Pulse Respirations Blood Pressure SpO2   (!) 101 3 °F (38 5 °C) (!) 117 22 142/92 95 %      Temp Source Heart Rate Source Patient Position - Orthostatic VS BP Location FiO2 (%)   Tympanic Monitor Lying Right arm --      Pain Score       --           Vitals:    09/06/18 1500 09/06/18 1515 09/06/18 1530 09/06/18 1545   BP: 137/67 134/71 133/68 124/81   Pulse: 94 88 88 92   Patient Position - Orthostatic VS:           Visual Acuity      ED Medications  Medications   sodium chloride 0 9 % bolus 2,000 mL (0 mL Intravenous Stopped 9/6/18 1534)   ondansetron (ZOFRAN) injection 4 mg (4 mg Intravenous Given 9/6/18 1420)   famotidine (PEPCID) injection 20 mg (20 mg Intravenous Given 9/6/18 1438)       Diagnostic Studies  Results Reviewed     Procedure Component Value Units Date/Time    CKMB [[id number]]  (Normal) Collected:  09/06/18 1423    Lab Status:  Final result Specimen:  Blood from Arm, Left Updated:  09/06/18 1524     CK-MB Index <1 0 %      CK-MB FRACTION 1 3 ng/mL     CK [[id number]]  (Normal) Collected:  09/06/18 1423    Lab Status:  Final result Specimen:  Blood from Arm, Left Updated:  09/06/18 1506     Total  U/L     Lactic acid, plasma [[id number]]  (Normal) Collected:  09/06/18 1423    Lab Status:  Final result Specimen:  Blood from Arm, Left Updated:  09/06/18 1453     LACTIC ACID 2 0 mmol/L     Narrative:         Result may be elevated if tourniquet was used during collection      Comprehensive metabolic panel [[id number]]  (Abnormal) Collected:  09/06/18 1423    Lab Status:  Final result Specimen:  Blood from Arm, Left Updated:  09/06/18 1448     Sodium 133 (L) mmol/L      Potassium 4 2 mmol/L      Chloride 99 (L) mmol/L      CO2 24 mmol/L      ANION GAP 10 mmol/L      BUN 12 mg/dL      Creatinine 1 43 (H) mg/dL      Glucose 95 mg/dL      Calcium 9 2 mg/dL      AST 40 U/L      ALT 64 U/L      Alkaline Phosphatase 104 U/L      Total Protein 8 2 g/dL      Albumin 4 3 g/dL      Total Bilirubin 1 10 (H) mg/dL      eGFR 68 ml/min/1 73sq m     Narrative:         National Kidney Disease Education Program recommendations are as follows:  GFR calculation is accurate only with a steady state creatinine  Chronic Kidney disease less than 60 ml/min/1 73 sq  meters  Kidney failure less than 15 ml/min/1 73 sq  meters      Lipase [[id number]]  (Normal) Collected:  09/06/18 1423    Lab Status:  Final result Specimen:  Blood from Arm, Left Updated:  09/06/18 1443     Lipase 112 u/L     CBC and differential [[id number]]  (Abnormal) Collected:  09/06/18 1423    Lab Status:  Final result Specimen:  Blood from Arm, Left Updated:  09/06/18 1432     WBC 16 54 (H) Thousand/uL      RBC 6 83 (H) Million/uL      Hemoglobin 12 8 g/dL      Hematocrit 43 2 %      MCV 63 (L) fL      MCH 18 7 (L) pg      MCHC 29 6 (L) g/dL      RDW 18 4 (H) %      MPV 8 9 fL      Platelets 782 (H) Thousands/uL      nRBC 0 /100 WBCs      Neutrophils Relative 78 (H) %      Immat GRANS % 0 %      Lymphocytes Relative 13 (L) %      Monocytes Relative 8 %      Eosinophils Relative 0 %      Basophils Relative 1 %      Neutrophils Absolute 12 87 (H) Thousands/µL      Immature Grans Absolute 0 07 Thousand/uL      Lymphocytes Absolute 2 09 Thousands/µL      Monocytes Absolute 1 38 (H) Thousand/µL      Eosinophils Absolute 0 02 Thousand/µL      Basophils Absolute 0 11 (H) Thousands/µL     Rapid drug screen, urine [[id number]]     Lab Status:  No result Specimen:  Urine     UA w Reflex to Microscopic [[id number]]     Lab Status:  No result Specimen:  Urine                  No orders to display              Procedures  Procedures       Phone Contacts  ED Phone Contact    ED Course                               MDM  Number of Diagnoses or Management Options  Heat stroke:   Diagnosis management comments: Pt feeling improved  Still with burning discomfort in abdomen  Soft, ND abdomen  Doubt serious intraabdominal pathology as primary issue  Considered ischemic bowel but lactate 2 0 and pt very comfortable appearing  Overall presentation is most c/w heat stroke as it happened while exercising outside, pt was drenched in sweat, had no abd pain this AM prior to the training  Discussed risk/benefit of abd CT  Would prefer to avoid IV contrast given slight SARAHI  I advised him to return immediately for worsening pain or failure to full improve over the next 24 hours, or for bloody bowel movement  The patient presented with a condition in which there was a high probability of imminent or life-threatening deterioration, and critical care services (excluding separately billable procedures) totalled 30-74 minutes  Disposition  Final diagnoses:   Heat stroke     Time reflects when diagnosis was documented in both MDM as applicable and the Disposition within this note     Time User Action Codes Description Comment    9/6/2018  3:28 PM Nelda Wootenter, [address] [place] [address]  0XXA] Heat stroke       ED Disposition     ED Disposition Condition Comment    Discharge  Arthor Nelly discharge to home/self care  Condition at discharge: Stable        Follow-up Information     Follow up With Specialties Details Why [facility], DO Family Medicine In 2 weeks  [address] [facility]  [phone]            Discharge Medication List as of 9/6/2018  3:33 PM      CONTINUE these medications which have NOT CHANGED    Details   psyllium (METAMUCIL) 58 6 % powder Take 1 packet by mouth 3 (three) times a day, Historical Med           No discharge procedures on file      ED Provider  Electronically Signed by           Radha Lai DO  09/06/18 9901

## 2018-09-06 NOTE — ED NOTES
Pt aware urine specimen is needed, urinal @ bedside  Mom & dad at bedside        Milagros Dandy, RN  09/06/18 0440

## 2018-09-07 ENCOUNTER — TELEPHONE (OUTPATIENT)
Dept: ADMINISTRATIVE | Facility: OTHER | Age: 23
End: 2018-09-07

## 2018-09-07 LAB
ATRIAL RATE: 123 BPM
P AXIS: 53 DEGREES
PR INTERVAL: 170 MS
QRS AXIS: 1 DEGREES
QRSD INTERVAL: 78 MS
QT INTERVAL: 292 MS
QTC INTERVAL: 418 MS
T WAVE AXIS: 35 DEGREES
VENTRICULAR RATE: 123 BPM

## 2018-09-07 PROCEDURE — 93010 ELECTROCARDIOGRAM REPORT: CPT | Performed by: INTERNAL MEDICINE

## 2018-09-07 NOTE — TELEPHONE ENCOUNTER
LMOM for patient to call [facility]  He needs follow up appointment in 2 weeks per ED Provider  ED visit documented in ED log

## 2018-09-13 PROBLEM — A04.72 CLOSTRIDIUM DIFFICILE COLITIS: Status: ACTIVE | Noted: 2018-09-13

## 2018-09-13 PROBLEM — K85.90 PANCREATITIS: Status: ACTIVE | Noted: 2018-09-13

## 2018-09-13 PROBLEM — K51.00 CHRONIC ULCERATIVE PANCOLITIS (HCC): Status: ACTIVE | Noted: 2018-09-13

## 2018-09-13 PROBLEM — K62.4 STRICTURE OF RECTUM: Status: ACTIVE | Noted: 2018-09-13

## 2018-09-13 PROBLEM — K91.850 ILEAL POUCHITIS (HCC): Status: ACTIVE | Noted: 2018-09-13

## 2018-09-13 RX ORDER — ZOLPIDEM TARTRATE 12.5 MG/1
TABLET, FILM COATED, EXTENDED RELEASE ORAL
COMMUNITY
End: 2018-09-14

## 2018-09-13 RX ORDER — ALPRAZOLAM 1 MG/1
TABLET ORAL
COMMUNITY
End: 2018-09-14

## 2018-09-13 RX ORDER — MESALAMINE 1.2 G/1
TABLET, DELAYED RELEASE ORAL
COMMUNITY
End: 2018-09-14

## 2018-09-13 RX ORDER — MESALAMINE 375 MG/1
CAPSULE, EXTENDED RELEASE ORAL
COMMUNITY
End: 2018-09-14

## 2018-09-13 RX ORDER — MESALAMINE 4 G/60ML
ENEMA RECTAL
COMMUNITY
End: 2018-09-14

## 2018-09-13 RX ORDER — LORAZEPAM 2 MG/1
TABLET ORAL
COMMUNITY
End: 2018-09-14

## 2018-09-13 RX ORDER — PREDNISONE 20 MG/1
TABLET ORAL
COMMUNITY
End: 2018-09-14

## 2018-09-13 RX ORDER — AZATHIOPRINE 50 MG/1
TABLET ORAL
COMMUNITY
End: 2018-09-14

## 2018-09-13 RX ORDER — MESALAMINE 375 MG/1
4 CAPSULE, EXTENDED RELEASE ORAL DAILY
COMMUNITY
Start: 2014-08-12 | End: 2018-09-14

## 2018-09-13 RX ORDER — METRONIDAZOLE 250 MG/1
TABLET ORAL
COMMUNITY
End: 2018-09-13

## 2018-09-13 RX ORDER — ONDANSETRON 4 MG/1
TABLET, ORALLY DISINTEGRATING ORAL
COMMUNITY
End: 2018-09-14

## 2018-09-13 RX ORDER — ESCITALOPRAM OXALATE 10 MG/1
TABLET ORAL
COMMUNITY
End: 2018-09-14 | Stop reason: SDUPTHER

## 2018-09-13 RX ORDER — CEPHALEXIN 500 MG/1
CAPSULE ORAL
COMMUNITY
End: 2018-09-14

## 2018-09-13 RX ORDER — VANCOMYCIN HYDROCHLORIDE 250 MG/1
CAPSULE ORAL
COMMUNITY
End: 2018-09-14

## 2018-09-13 RX ORDER — METRONIDAZOLE 500 MG/1
TABLET ORAL
COMMUNITY
End: 2018-09-14

## 2018-09-13 RX ORDER — INFLIXIMAB 100 MG/10ML
420 INJECTION, POWDER, LYOPHILIZED, FOR SOLUTION INTRAVENOUS
COMMUNITY
End: 2018-09-14

## 2018-09-13 RX ORDER — MESALAMINE 1000 MG/1
SUPPOSITORY RECTAL DAILY
COMMUNITY
End: 2018-09-14

## 2018-09-13 RX ORDER — DIPHENOXYLATE HYDROCHLORIDE AND ATROPINE SULFATE 2.5; .025 MG/1; MG/1
TABLET ORAL EVERY 6 HOURS
COMMUNITY
End: 2018-09-14

## 2018-09-13 RX ORDER — CIPROFLOXACIN 500 MG/1
TABLET, FILM COATED ORAL
COMMUNITY
End: 2018-09-14

## 2018-09-13 RX ORDER — BUDESONIDE 9 MG/1
TABLET, EXTENDED RELEASE ORAL
COMMUNITY
End: 2018-09-14

## 2018-09-13 RX ORDER — OXYCODONE HYDROCHLORIDE 5 MG/1
CAPSULE ORAL
COMMUNITY
End: 2018-09-14

## 2018-09-13 RX ORDER — SUCRALFATE ORAL 1 G/10ML
SUSPENSION ORAL
COMMUNITY
End: 2018-09-14

## 2018-09-13 RX ORDER — OXYCODONE HYDROCHLORIDE AND ACETAMINOPHEN 5; 325 MG/1; MG/1
TABLET ORAL
COMMUNITY
End: 2018-09-14

## 2018-09-13 RX ORDER — PREDNISONE 1 MG/1
TABLET ORAL
COMMUNITY
End: 2018-09-14

## 2018-09-13 RX ORDER — PANTOPRAZOLE SODIUM 40 MG/1
TABLET, DELAYED RELEASE ORAL
COMMUNITY
End: 2018-09-14

## 2018-09-13 RX ORDER — OMEPRAZOLE 20 MG/1
CAPSULE, DELAYED RELEASE ORAL
COMMUNITY
End: 2018-09-13

## 2018-09-13 RX ORDER — POLYETHYLENE GLYCOL 3350, SODIUM CHLORIDE, SODIUM BICARBONATE, POTASSIUM CHLORIDE 420; 11.2; 5.72; 1.48 G/4L; G/4L; G/4L; G/4L
POWDER, FOR SOLUTION ORAL
COMMUNITY
End: 2018-09-14

## 2018-09-13 RX ORDER — HYDROCODONE BITARTRATE AND ACETAMINOPHEN 7.5; 3 MG/1; MG/1
TABLET ORAL
COMMUNITY
End: 2018-09-14

## 2018-09-13 RX ORDER — VANCOMYCIN HYDROCHLORIDE 125 MG/1
CAPSULE ORAL
COMMUNITY
End: 2018-09-13

## 2018-09-13 RX ORDER — PREDNISONE 10 MG/1
TABLET ORAL
COMMUNITY
End: 2018-09-14

## 2018-09-13 RX ORDER — LEVOFLOXACIN 500 MG/1
TABLET, FILM COATED ORAL
COMMUNITY
End: 2018-09-14

## 2018-09-13 RX ORDER — TRAMADOL HYDROCHLORIDE 50 MG/1
TABLET ORAL
COMMUNITY
End: 2018-09-14

## 2018-09-13 RX ORDER — AZATHIOPRINE 50 MG/1
TABLET ORAL
COMMUNITY
End: 2018-09-13

## 2018-09-13 RX ORDER — CLINDAMYCIN AND BENZOYL PEROXIDE 10; 50 MG/G; MG/G
GEL TOPICAL
COMMUNITY
End: 2018-09-14

## 2018-09-14 ENCOUNTER — OFFICE VISIT (OUTPATIENT)
Dept: FAMILY MEDICINE CLINIC | Facility: CLINIC | Age: 23
End: 2018-09-14
Payer: OTHER MISCELLANEOUS

## 2018-09-14 VITALS
TEMPERATURE: 97.6 F | HEIGHT: 69 IN | WEIGHT: 191.8 LBS | SYSTOLIC BLOOD PRESSURE: 138 MMHG | DIASTOLIC BLOOD PRESSURE: 76 MMHG | HEART RATE: 80 BPM | BODY MASS INDEX: 28.41 KG/M2 | RESPIRATION RATE: 18 BRPM

## 2018-09-14 DIAGNOSIS — E86.0 DEHYDRATION: Primary | ICD-10-CM

## 2018-09-14 DIAGNOSIS — F41.1 GAD (GENERALIZED ANXIETY DISORDER): Primary | ICD-10-CM

## 2018-09-14 PROCEDURE — 99213 OFFICE O/P EST LOW 20 MIN: CPT | Performed by: FAMILY MEDICINE

## 2018-09-14 RX ORDER — ESCITALOPRAM OXALATE 10 MG/1
10 TABLET ORAL DAILY
Qty: 30 TABLET | Refills: 1 | Status: SHIPPED | OUTPATIENT
Start: 2018-09-14 | End: 2018-09-21 | Stop reason: SDUPTHER

## 2018-09-14 NOTE — PROGRESS NOTES
Assessment/Plan:    No problem-specific Assessment & Plan notes found for this encounter  He is agreeable to discuss lexapro at another time  Clinically heat stroke/dehydration resolved but recheck creatinine test for return to baseline  Hydrate  Prevention discussed     Diagnoses and all orders for this visit:    Dehydration  -     Basic metabolic panel; Future    Other orders  -     Discontinue: ALPRAZolam (XANAX) 1 mg tablet; alprazolam 1 mg tablet  -     Discontinue: mesalamine (APRISO) 0 375 g 24 hr capsule; Apriso 0 375 gram capsule,extended release  -     Discontinue: azaTHIOprine (IMURAN) 50 mg tablet; azathioprine 50 mg tablet  -     Discontinue: mesalamine (CANASA) 1,000 mg suppository; Daily  -     Discontinue: sucralfate (CARAFATE) 1 g/10 mL suspension; Carafate 100 mg/mL oral suspension  -     Discontinue: cephalexin (KEFLEX) 500 mg capsule; cephalexin 500 mg capsule  -     Discontinue: ciprofloxacin (CIPRO) 500 mg tablet; ciprofloxacin 500 mg tablet  -     Discontinue: clindamycin-benzoyl peroxide (BENZACLIN) gel; clindamycin 1 %-benzoyl peroxide 5 % topical gel  -     Discontinue: hydrocortisone (CORTIFOAM) 10 % rectal foam; Cortifoam 10 % (80 mg) rectal  -     Discontinue: vedolizumab (ENTYVIO) 300 MG SOLR; 300 mg  -     Discontinue: escitalopram (LEXAPRO) 10 mg tablet; escitalopram 10 mg tablet  -     Discontinue: polyethylene glycol-electrolytes (GAVILYTE-N WITH FLAVOR PACK) 4000 mL solution; GaviLyte-N 420 gram oral solution  -     Discontinue: Adalimumab (HUMIRA PEN) 40 MG/0 4ML PNKT;  Humira Pen 40 mg/0 8 mL subcutaneous kit  -     Discontinue: HYDROcodone-acetaminophen (XODOL) 7 5-300 MG per tablet; hydrocodone 7 5 mg-acetaminophen 300 mg tablet  -     Discontinue: levofloxacin (LEVAQUIN) 500 mg tablet; levofloxacin 500 mg tablet  -     Discontinue: diphenoxylate-atropine (LOMOTIL) 2 5-0 025 mg per tablet; every 6 (six) hours  -     Discontinue: LORazepam (ATIVAN) 2 mg tablet; lorazepam 2 mg tablet  -     Discontinue: metroNIDAZOLE (FLAGYL) 250 mg tablet; metronidazole 250 mg tablet  -     Discontinue: metroNIDAZOLE (FLAGYL) 500 mg tablet; metronidazole 500 mg tablet  -     Discontinue: omeprazole (PriLOSEC) 20 mg delayed release capsule; omeprazole 20 mg capsule,delayed release  -     Discontinue: ondansetron (ZOFRAN-ODT) 4 mg disintegrating tablet; ondansetron 4 mg disintegrating tablet  -     Discontinue: oxyCODONE (OXY-IR) 5 MG capsule; oxycodone 5 mg tablet  -     Discontinue: oxyCODONE-acetaminophen (PERCOCET) 5-325 mg per tablet; oxycodone-acetaminophen 5 mg-325 mg tablet  -     Discontinue: pantoprazole (PROTONIX) 40 mg tablet; pantoprazole 40 mg tablet,delayed release  -     Discontinue: predniSONE 10 mg tablet; prednisone 10 mg tablet  -     Discontinue: predniSONE 20 mg tablet; Take 1 tablet BID decreased by 5 mg every 5 th day  -     Discontinue: predniSONE 5 mg tablet; prednisone 5 mg tablet  -     Discontinue: hydrocortisone-pramoxine (PROCTOFOAM HC) rectal foam; Every 12 hours  -     Discontinue: inFLIXimab (REMICADE) 100 mg; 420 mg  -     Discontinue: mesalamine (ROWASA) 4 g; Rowasa rectal suspension enema 4 gram/60 mL  -     Discontinue: traMADol (ULTRAM) 50 mg tablet; tramadol 50 mg tablet  -     Discontinue: Budesonide (UCERIS) 2 MG/ACT FOAM; Uceris 2 mg/actuation rectal foam  -     Discontinue: Budesonide (UCERIS) 9 MG TB24; Uceris 9 mg tablet, extended release  -     Discontinue: vancomycin (VANCOCIN) 125 MG capsule; vancomycin 125 mg capsule  -     Discontinue: vancomycin (VANCOCIN) 250 MG capsule; vancomycin 250 mg capsule  -     Ergocalciferol (VITAMIN D2 PO);  Vitamin D2 50,000 unit capsule  -     Discontinue: zolpidem (AMBIEN CR) 12 5 MG CR tablet; zolpidem ER 12 5 mg tablet,extended release,multiphase  -     Discontinue: mesalamine (LIALDA) 1 2 g EC tablet; Lialda 1 2 gram tablet,delayed release  -     Discontinue: azaTHIOprine (IMURAN) 50 mg tablet;   -     Discontinue: mesalamine (APRISO) 0 375 g 24 hr capsule; Take 4 capsules by mouth Daily        Return if symptoms worsen or fail to improve  Subjective:      Patient ID: Salena Marquez is a 20 y o  male  Chief Complaint   Patient presents with    Follow-up     on emergency room visit for heat stroke akrmaubree PEARCE  Feels better  Some migraines since  No head injury  Creatinine 1 43, usually 1 06  Was trying to hydrate  No CT  On metamucil now as usual  No weakness or dizziness    The following portions of the patient's history were reviewed and updated as appropriate: allergies, current medications, past family history, past medical history, past social history, past surgical history and problem list     Review of Systems   Constitutional: Negative for chills and fever  Neurological: Negative for dizziness  Current Outpatient Prescriptions   Medication Sig Dispense Refill    psyllium (METAMUCIL) 58 6 % powder Take 1 packet by mouth 3 (three) times a day      Ergocalciferol (VITAMIN D2 PO) Vitamin D2 50,000 unit capsule      escitalopram (LEXAPRO) 10 mg tablet Take 1 tablet (10 mg total) by mouth daily 30 tablet 1     No current facility-administered medications for this visit  Objective:    /76   Pulse 80   Temp 97 6 °F (36 4 °C)   Resp 18   Ht 5' 9" (1 753 m)   Wt 87 kg (191 lb 12 8 oz)   BMI 28 32 kg/m²        Physical Exam   Constitutional: He appears well-developed  No distress  HENT:   Head: Normocephalic  Mouth/Throat: No oropharyngeal exudate  Eyes: Conjunctivae are normal  No scleral icterus  Neck: Neck supple  Cardiovascular: Normal rate and intact distal pulses  No murmur heard  Pulmonary/Chest: Effort normal  No respiratory distress  Abdominal: Soft  There is no tenderness  Musculoskeletal: He exhibits no edema or deformity  Lymphadenopathy:     He has no cervical adenopathy  Neurological: He is alert  He exhibits normal muscle tone     Skin: Skin is warm and dry  No rash noted  No pallor  Psychiatric: His behavior is normal  Thought content normal    Nursing note and vitals reviewed               Matt Olivares DO

## 2018-09-21 ENCOUNTER — OFFICE VISIT (OUTPATIENT)
Dept: FAMILY MEDICINE CLINIC | Facility: CLINIC | Age: 23
End: 2018-09-21
Payer: COMMERCIAL

## 2018-09-21 VITALS
HEIGHT: 69 IN | HEART RATE: 82 BPM | TEMPERATURE: 98 F | SYSTOLIC BLOOD PRESSURE: 122 MMHG | RESPIRATION RATE: 20 BRPM | DIASTOLIC BLOOD PRESSURE: 72 MMHG | BODY MASS INDEX: 27.99 KG/M2 | WEIGHT: 189 LBS

## 2018-09-21 DIAGNOSIS — F41.1 GAD (GENERALIZED ANXIETY DISORDER): ICD-10-CM

## 2018-09-21 DIAGNOSIS — J06.9 UPPER RESPIRATORY TRACT INFECTION, UNSPECIFIED TYPE: Primary | ICD-10-CM

## 2018-09-21 PROCEDURE — 99213 OFFICE O/P EST LOW 20 MIN: CPT | Performed by: FAMILY MEDICINE

## 2018-09-21 PROCEDURE — 3008F BODY MASS INDEX DOCD: CPT | Performed by: FAMILY MEDICINE

## 2018-09-21 RX ORDER — AZITHROMYCIN 250 MG/1
TABLET, FILM COATED ORAL
Qty: 6 TABLET | Refills: 0 | Status: SHIPPED | OUTPATIENT
Start: 2018-09-21 | End: 2018-09-25

## 2018-09-21 RX ORDER — ESCITALOPRAM OXALATE 10 MG/1
10 TABLET ORAL DAILY
Qty: 30 TABLET | Refills: 1 | Status: SHIPPED | OUTPATIENT
Start: 2018-09-21 | End: 2018-10-19 | Stop reason: SDUPTHER

## 2018-09-21 NOTE — TELEPHONE ENCOUNTER
At pt's last visit he got a bw order and prescription for Lexapro, it was lost  I printed out another bw order for him but he will need another script for his Lexapro, pt states to just call it in to the [facility]

## 2018-09-21 NOTE — PROGRESS NOTES
Assessment/Plan:    Problem List Items Addressed This Visit     None      Visit Diagnoses     Upper respiratory tract infection, unspecified type    -  Primary    Relevant Medications    azithromycin (ZITHROMAX) 250 mg tablet          There are no Patient Instructions on file for this visit  No Follow-up on file  Subjective:      Patient ID: Joy Valencia is a 20 y o  male  Chief Complaint   Patient presents with    Sore Throat     symptoms for the past 5 days  rmklpn    Headache    Fatigue    Dizziness    sinus draingae       Pt states for the l;ast 5 days he has had a HA sore throat ear pain as well and feels dizzy or off balance  No fever no chills  sweating        The following portions of the patient's history were reviewed and updated as appropriate: allergies, current medications, past family history, past medical history, past social history, past surgical history and problem list     Review of Systems   Constitutional: Negative for activity change, appetite change, chills, diaphoresis, fatigue, fever and unexpected weight change  HENT: Positive for congestion, postnasal drip and sinus pressure  Negative for dental problem, ear pain, mouth sores, sinus pain, sore throat and trouble swallowing  Eyes: Negative for photophobia, discharge and itching  Respiratory: Positive for cough  Negative for apnea, chest tightness and shortness of breath  Cardiovascular: Negative for chest pain, palpitations and leg swelling  Gastrointestinal: Negative for abdominal distention, abdominal pain, blood in stool, nausea and vomiting  Endocrine: Negative for cold intolerance, heat intolerance, polydipsia, polyphagia and polyuria  Genitourinary: Negative for difficulty urinating  Musculoskeletal: Negative for arthralgias  Skin: Negative for color change and wound  Neurological: Negative for dizziness, syncope, speech difficulty and headaches  Hematological: Negative for adenopathy  Psychiatric/Behavioral: Negative for agitation and behavioral problems  Current Outpatient Prescriptions   Medication Sig Dispense Refill    Ergocalciferol (VITAMIN D2 PO) Vitamin D2 50,000 unit capsule      escitalopram (LEXAPRO) 10 mg tablet Take 1 tablet (10 mg total) by mouth daily 30 tablet 1    psyllium (METAMUCIL) 58 6 % powder Take 1 packet by mouth 3 (three) times a day      azithromycin (ZITHROMAX) 250 mg tablet Take 2 tablets today then 1 tablet daily x 4 days 6 tablet 0     No current facility-administered medications for this visit  Objective:    /72   Pulse 82   Temp 98 °F (36 7 °C)   Resp 20   Ht 5' 9" (1 753 m)   Wt 85 7 kg (189 lb)   BMI 27 91 kg/m²        Physical Exam   Constitutional: He appears well-developed and well-nourished  No distress  HENT:   Head: Normocephalic and atraumatic  Right Ear: External ear normal  Tympanic membrane is erythematous and bulging  Left Ear: External ear normal  Tympanic membrane is erythematous and bulging  Nose: Mucosal edema present  Mouth/Throat: Oropharynx is clear and moist  No oropharyngeal exudate  Eyes: EOM are normal  Pupils are equal, round, and reactive to light  Right eye exhibits no discharge  Left eye exhibits no discharge  No scleral icterus  Neck: No thyromegaly present  Cardiovascular: Normal rate and normal heart sounds  No murmur heard  Pulmonary/Chest: Effort normal and breath sounds normal  No respiratory distress  He has no wheezes  Abdominal: Soft  Bowel sounds are normal  He exhibits no distension and no mass  There is no tenderness  There is no rebound and no guarding  Musculoskeletal: Normal range of motion  Neurological: He is alert  He displays normal reflexes  Coordination normal    Skin: Skin is warm and dry  No rash noted  He is not diaphoretic  No erythema  Psychiatric: He has a normal mood and affect  His behavior is normal    Nursing note and vitals reviewed  Chun Ascencio, DO

## 2018-10-16 ENCOUNTER — TELEPHONE (OUTPATIENT)
Dept: FAMILY MEDICINE CLINIC | Facility: CLINIC | Age: 23
End: 2018-10-16

## 2018-10-19 ENCOUNTER — OFFICE VISIT (OUTPATIENT)
Dept: FAMILY MEDICINE CLINIC | Facility: CLINIC | Age: 23
End: 2018-10-19
Payer: COMMERCIAL

## 2018-10-19 VITALS
HEART RATE: 76 BPM | SYSTOLIC BLOOD PRESSURE: 122 MMHG | HEIGHT: 69 IN | BODY MASS INDEX: 28.41 KG/M2 | TEMPERATURE: 98.2 F | DIASTOLIC BLOOD PRESSURE: 76 MMHG | RESPIRATION RATE: 16 BRPM | WEIGHT: 191.8 LBS

## 2018-10-19 DIAGNOSIS — K51.00 ULCERATIVE PANCOLITIS (HCC): Primary | ICD-10-CM

## 2018-10-19 DIAGNOSIS — F41.1 GAD (GENERALIZED ANXIETY DISORDER): ICD-10-CM

## 2018-10-19 DIAGNOSIS — K51.019 CHRONIC ULCERATIVE PANCOLITIS WITH COMPLICATION (HCC): ICD-10-CM

## 2018-10-19 PROBLEM — E86.0 DEHYDRATION: Status: RESOLVED | Noted: 2018-09-14 | Resolved: 2018-10-19

## 2018-10-19 PROBLEM — K85.90 PANCREATITIS: Status: RESOLVED | Noted: 2018-09-13 | Resolved: 2018-10-19

## 2018-10-19 PROCEDURE — 1036F TOBACCO NON-USER: CPT | Performed by: FAMILY MEDICINE

## 2018-10-19 PROCEDURE — 3008F BODY MASS INDEX DOCD: CPT | Performed by: FAMILY MEDICINE

## 2018-10-19 PROCEDURE — 99214 OFFICE O/P EST MOD 30 MIN: CPT | Performed by: FAMILY MEDICINE

## 2018-10-19 RX ORDER — ESCITALOPRAM OXALATE 10 MG/1
10 TABLET ORAL DAILY
Qty: 90 TABLET | Refills: 1 | Status: SHIPPED | OUTPATIENT
Start: 2018-10-19 | End: 2018-10-29

## 2018-10-19 NOTE — PATIENT INSTRUCTIONS
If we were to switch to zoloft 50mg/d (sertraline) in the future, you would start it by taking 25mg/d for the first week the day after the last dose of lexapro

## 2018-10-19 NOTE — PROGRESS NOTES
Assessment/Plan:    No problem-specific Assessment & Plan notes found for this encounter  Anxiety has improved but pt uncertain yet if full response, has been 3w, willing to try at same dose for longer to decide  Defers counseling  If he calls to say lexapro does not seem to be working well, we discussed offering zoloft 50mg/d and then f/u 1m after that  He was advised to take 1/2 tab of the zoloft for the first week  Chronic UC unchanged, not on biologics at this time  bmi advised  His medical/mental conditions do not impact work duties per pt  Denies suicidal/homicidal/destructive ideations  Diagnoses and all orders for this visit:    Ulcerative pancolitis ([address] )    CAR (generalized anxiety disorder)  -     escitalopram (LEXAPRO) 10 mg tablet; Take 1 tablet (10 mg total) by mouth daily    Chronic ulcerative pancolitis with complication (HCC)    BMI 28 0-28 9,adult              No Follow-up on file  Subjective:      Patient ID: Jocelyne Sofia is a 20 y o  male  Chief Complaint   Patient presents with    Depression     [facility]       HPI  Soft stool 6-10x/d on good day, twice on stressful day  Taking lexapro  Not much better on lexapro 10mg  Never was on for a long time  Mostly anxiety  Sx daily  Worries about several things at once, not quite panic  Worries excessively  Real worries, health problems  Bothersome  Some trouble sleeping  Never been to counseling  meds helped some but not quite normal    The following portions of the patient's history were reviewed and updated as appropriate: allergies, current medications, past family history, past medical history, past social history, past surgical history and problem list     Review of Systems   Constitutional: Negative for chills and fever  Cardiovascular: Negative for chest pain  Gastrointestinal: Positive for diarrhea           Current Outpatient Prescriptions   Medication Sig Dispense Refill    Ergocalciferol (VITAMIN D2 PO) Vitamin D2 50,000 unit capsule      escitalopram (LEXAPRO) 10 mg tablet Take 1 tablet (10 mg total) by mouth daily 90 tablet 1    psyllium (METAMUCIL) 58 6 % powder Take 1 packet by mouth 3 (three) times a day       No current facility-administered medications for this visit  Objective:    /76   Pulse 76   Temp 98 2 °F (36 8 °C)   Resp 16   Ht 5' 9" (1 753 m)   Wt 87 kg (191 lb 12 8 oz)   BMI 28 32 kg/m²        Physical Exam   Constitutional: He appears well-developed  No distress  HENT:   Head: Normocephalic  Mouth/Throat: No oropharyngeal exudate  Eyes: Conjunctivae are normal  No scleral icterus  Neck: Neck supple  Cardiovascular: Normal rate and intact distal pulses  No murmur heard  Pulmonary/Chest: Effort normal  No respiratory distress  Abdominal: Soft  He exhibits no distension  Musculoskeletal: He exhibits no edema or deformity  Neurological: He is alert  Skin: Skin is warm and dry  No rash noted  No pallor  Psychiatric: His behavior is normal  Thought content normal    Nursing note and vitals reviewed               Sony Jewell,

## 2018-10-27 LAB
BUN SERPL-MCNC: 7 MG/DL (ref 6–20)
BUN/CREAT SERPL: 6 (ref 9–20)
CALCIUM SERPL-MCNC: 9.1 MG/DL (ref 8.7–10.2)
CHLORIDE SERPL-SCNC: 101 MMOL/L (ref 96–106)
CO2 SERPL-SCNC: 23 MMOL/L (ref 20–29)
CREAT SERPL-MCNC: 1.1 MG/DL (ref 0.76–1.27)
GLUCOSE SERPL-MCNC: 83 MG/DL (ref 65–99)
LABCORP COMMENT: NORMAL
POTASSIUM SERPL-SCNC: 4.2 MMOL/L (ref 3.5–5.2)
SL AMB EGFR AFRICAN AMERICAN: 107 ML/MIN/1.73
SL AMB EGFR NON AFRICAN AMERICAN: 93 ML/MIN/1.73
SODIUM SERPL-SCNC: 138 MMOL/L (ref 134–144)

## 2018-10-29 ENCOUNTER — TELEPHONE (OUTPATIENT)
Dept: FAMILY MEDICINE CLINIC | Facility: CLINIC | Age: 23
End: 2018-10-29

## 2018-10-29 DIAGNOSIS — F41.1 GAD (GENERALIZED ANXIETY DISORDER): Primary | ICD-10-CM

## 2018-11-01 ENCOUNTER — TELEPHONE (OUTPATIENT)
Dept: FAMILY MEDICINE CLINIC | Facility: CLINIC | Age: 23
End: 2018-11-01

## 2018-11-01 NOTE — TELEPHONE ENCOUNTER
Pts lab results say normal levels, but he has questions about the Bun and Creatinine being at a 6, when normal values are 9 and above  Please call and discuss this with patient

## 2018-11-02 ENCOUNTER — TELEPHONE (OUTPATIENT)
Dept: FAMILY MEDICINE CLINIC | Facility: CLINIC | Age: 23
End: 2018-11-02

## 2018-11-02 NOTE — TELEPHONE ENCOUNTER
Pt would like a call from Dr Forest Lopez  He has questions regarding labs and medication   Please call pt  [facility]

## 2018-11-02 NOTE — TELEPHONE ENCOUNTER
sw pt  Told him he could report zoloft use to employer if asked  Bun/cr ratio ok  Creatinine back to normal  zoloft helping mood and bowel symptoms

## 2019-01-05 ENCOUNTER — HOSPITAL ENCOUNTER (INPATIENT)
Facility: HOSPITAL | Age: 24
LOS: 2 days | Discharge: HOME | DRG: 378 | End: 2019-01-07
Attending: EMERGENCY MEDICINE | Admitting: STUDENT IN AN ORGANIZED HEALTH CARE EDUCATION/TRAINING PROGRAM
Payer: COMMERCIAL

## 2019-01-05 ENCOUNTER — APPOINTMENT (EMERGENCY)
Dept: RADIOLOGY | Facility: HOSPITAL | Age: 24
DRG: 378 | End: 2019-01-05
Payer: COMMERCIAL

## 2019-01-05 DIAGNOSIS — R10.9 ABDOMINAL PAIN: Primary | ICD-10-CM

## 2019-01-05 DIAGNOSIS — R10.9 ABDOMINAL PAIN: ICD-10-CM

## 2019-01-05 DIAGNOSIS — D72.829 LEUKOCYTOSIS: ICD-10-CM

## 2019-01-05 PROBLEM — N17.9 AKI (ACUTE KIDNEY INJURY) (HCC): Status: ACTIVE | Noted: 2019-01-05

## 2019-01-05 PROBLEM — F41.9 ANXIETY: Status: ACTIVE | Noted: 2019-01-05

## 2019-01-05 PROBLEM — E87.1 HYPONATREMIA: Status: ACTIVE | Noted: 2019-01-05

## 2019-01-05 PROBLEM — R17 SERUM TOTAL BILIRUBIN ELEVATED: Status: ACTIVE | Noted: 2019-01-05

## 2019-01-05 PROBLEM — D75.839 THROMBOCYTOSIS: Status: ACTIVE | Noted: 2019-01-05

## 2019-01-05 PROBLEM — K92.2 GI BLEED: Status: ACTIVE | Noted: 2019-01-05

## 2019-01-05 LAB
ALBUMIN SERPL BCP-MCNC: 4.5 G/DL (ref 3.5–5)
ALP SERPL-CCNC: 110 U/L (ref 46–116)
ALT SERPL W P-5'-P-CCNC: 60 U/L (ref 12–78)
ANION GAP SERPL CALCULATED.3IONS-SCNC: 10 MMOL/L (ref 4–13)
AST SERPL W P-5'-P-CCNC: 36 U/L (ref 5–45)
BASOPHILS # BLD AUTO: 0.11 THOUSANDS/ΜL (ref 0–0.1)
BASOPHILS NFR BLD AUTO: 0 % (ref 0–1)
BILIRUB SERPL-MCNC: 1.1 MG/DL (ref 0.2–1)
BILIRUB UR QL STRIP: NEGATIVE
BUN SERPL-MCNC: 13 MG/DL (ref 5–25)
CALCIUM SERPL-MCNC: 10 MG/DL (ref 8.3–10.1)
CHLORIDE SERPL-SCNC: 95 MMOL/L (ref 100–108)
CK SERPL-CCNC: 151 U/L (ref 39–308)
CLARITY UR: CLEAR
CO2 SERPL-SCNC: 29 MMOL/L (ref 21–32)
COLOR UR: YELLOW
CREAT SERPL-MCNC: 1.61 MG/DL (ref 0.6–1.3)
CRP SERPL QL: 3.3 MG/L
EOSINOPHIL # BLD AUTO: 0.14 THOUSAND/ΜL (ref 0–0.61)
EOSINOPHIL NFR BLD AUTO: 1 % (ref 0–6)
ERYTHROCYTE [DISTWIDTH] IN BLOOD BY AUTOMATED COUNT: 19 % (ref 11.6–15.1)
ERYTHROCYTE [SEDIMENTATION RATE] IN BLOOD: 2 MM/HOUR (ref 2–10)
GFR SERPL CREATININE-BSD FRML MDRD: 59 ML/MIN/1.73SQ M
GLUCOSE SERPL-MCNC: 108 MG/DL (ref 65–140)
GLUCOSE UR STRIP-MCNC: NEGATIVE MG/DL
HCT VFR BLD AUTO: 47 % (ref 36.5–49.3)
HCT VFR BLD AUTO: 51.2 % (ref 36.5–49.3)
HGB BLD-MCNC: 13.9 G/DL (ref 12–17)
HGB BLD-MCNC: 15.1 G/DL (ref 12–17)
HGB UR QL STRIP.AUTO: NEGATIVE
IMM GRANULOCYTES # BLD AUTO: 0.15 THOUSAND/UL (ref 0–0.2)
IMM GRANULOCYTES NFR BLD AUTO: 1 % (ref 0–2)
INR PPP: 0.95 (ref 0.86–1.16)
KETONES UR STRIP-MCNC: NEGATIVE MG/DL
LACTATE SERPL-SCNC: 1.7 MMOL/L (ref 0.5–2)
LEUKOCYTE ESTERASE UR QL STRIP: NEGATIVE
LIPASE SERPL-CCNC: 110 U/L (ref 73–393)
LYMPHOCYTES # BLD AUTO: 1.26 THOUSANDS/ΜL (ref 0.6–4.47)
LYMPHOCYTES NFR BLD AUTO: 4 % (ref 14–44)
MCH RBC QN AUTO: 19.3 PG (ref 26.8–34.3)
MCHC RBC AUTO-ENTMCNC: 29.5 G/DL (ref 31.4–37.4)
MCV RBC AUTO: 65 FL (ref 82–98)
MONOCYTES # BLD AUTO: 1.71 THOUSAND/ΜL (ref 0.17–1.22)
MONOCYTES NFR BLD AUTO: 6 % (ref 4–12)
NEUTROPHILS # BLD AUTO: 25.53 THOUSANDS/ΜL (ref 1.85–7.62)
NEUTS SEG NFR BLD AUTO: 88 % (ref 43–75)
NITRITE UR QL STRIP: NEGATIVE
NRBC BLD AUTO-RTO: 0 /100 WBCS
PH UR STRIP.AUTO: 6.5 [PH] (ref 5–9)
PLATELET # BLD AUTO: 497 THOUSANDS/UL (ref 149–390)
PMV BLD AUTO: 8.7 FL (ref 8.9–12.7)
POTASSIUM SERPL-SCNC: 3.8 MMOL/L (ref 3.5–5.3)
PROCALCITONIN SERPL-MCNC: 0.17 NG/ML
PROT SERPL-MCNC: 9.1 G/DL (ref 6.4–8.2)
PROT UR STRIP-MCNC: NEGATIVE MG/DL
PROTHROMBIN TIME: 10 SECONDS (ref 9.4–11.7)
RBC # BLD AUTO: 7.84 MILLION/UL (ref 3.88–5.62)
SODIUM SERPL-SCNC: 134 MMOL/L (ref 136–145)
SP GR UR STRIP.AUTO: <=1.005 (ref 1–1.03)
UROBILINOGEN UR QL STRIP.AUTO: 0.2 E.U./DL
WBC # BLD AUTO: 28.9 THOUSAND/UL (ref 4.31–10.16)

## 2019-01-05 PROCEDURE — 87177 OVA AND PARASITES SMEARS: CPT | Performed by: STUDENT IN AN ORGANIZED HEALTH CARE EDUCATION/TRAINING PROGRAM

## 2019-01-05 PROCEDURE — 87081 CULTURE SCREEN ONLY: CPT | Performed by: STUDENT IN AN ORGANIZED HEALTH CARE EDUCATION/TRAINING PROGRAM

## 2019-01-05 PROCEDURE — 96361 HYDRATE IV INFUSION ADD-ON: CPT

## 2019-01-05 PROCEDURE — 83605 ASSAY OF LACTIC ACID: CPT | Performed by: EMERGENCY MEDICINE

## 2019-01-05 PROCEDURE — 85025 COMPLETE CBC W/AUTO DIFF WBC: CPT | Performed by: EMERGENCY MEDICINE

## 2019-01-05 PROCEDURE — 74177 CT ABD & PELVIS W/CONTRAST: CPT

## 2019-01-05 PROCEDURE — 96375 TX/PRO/DX INJ NEW DRUG ADDON: CPT

## 2019-01-05 PROCEDURE — 71046 X-RAY EXAM CHEST 2 VIEWS: CPT

## 2019-01-05 PROCEDURE — 84145 PROCALCITONIN (PCT): CPT | Performed by: EMERGENCY MEDICINE

## 2019-01-05 PROCEDURE — 81001 URINALYSIS AUTO W/SCOPE: CPT | Performed by: STUDENT IN AN ORGANIZED HEALTH CARE EDUCATION/TRAINING PROGRAM

## 2019-01-05 PROCEDURE — 82550 ASSAY OF CK (CPK): CPT | Performed by: STUDENT IN AN ORGANIZED HEALTH CARE EDUCATION/TRAINING PROGRAM

## 2019-01-05 PROCEDURE — 85652 RBC SED RATE AUTOMATED: CPT | Performed by: STUDENT IN AN ORGANIZED HEALTH CARE EDUCATION/TRAINING PROGRAM

## 2019-01-05 PROCEDURE — 96374 THER/PROPH/DIAG INJ IV PUSH: CPT

## 2019-01-05 PROCEDURE — 96376 TX/PRO/DX INJ SAME DRUG ADON: CPT

## 2019-01-05 PROCEDURE — 85610 PROTHROMBIN TIME: CPT | Performed by: EMERGENCY MEDICINE

## 2019-01-05 PROCEDURE — 80053 COMPREHEN METABOLIC PANEL: CPT | Performed by: EMERGENCY MEDICINE

## 2019-01-05 PROCEDURE — 87040 BLOOD CULTURE FOR BACTERIA: CPT | Performed by: EMERGENCY MEDICINE

## 2019-01-05 PROCEDURE — 87209 SMEAR COMPLEX STAIN: CPT | Performed by: STUDENT IN AN ORGANIZED HEALTH CARE EDUCATION/TRAINING PROGRAM

## 2019-01-05 PROCEDURE — 87493 C DIFF AMPLIFIED PROBE: CPT | Performed by: EMERGENCY MEDICINE

## 2019-01-05 PROCEDURE — 81003 URINALYSIS AUTO W/O SCOPE: CPT | Performed by: EMERGENCY MEDICINE

## 2019-01-05 PROCEDURE — 86140 C-REACTIVE PROTEIN: CPT | Performed by: STUDENT IN AN ORGANIZED HEALTH CARE EDUCATION/TRAINING PROGRAM

## 2019-01-05 PROCEDURE — 85018 HEMOGLOBIN: CPT | Performed by: STUDENT IN AN ORGANIZED HEALTH CARE EDUCATION/TRAINING PROGRAM

## 2019-01-05 PROCEDURE — 99222 1ST HOSP IP/OBS MODERATE 55: CPT | Performed by: STUDENT IN AN ORGANIZED HEALTH CARE EDUCATION/TRAINING PROGRAM

## 2019-01-05 PROCEDURE — 83690 ASSAY OF LIPASE: CPT | Performed by: EMERGENCY MEDICINE

## 2019-01-05 PROCEDURE — 99285 EMERGENCY DEPT VISIT HI MDM: CPT

## 2019-01-05 PROCEDURE — C9113 INJ PANTOPRAZOLE SODIUM, VIA: HCPCS | Performed by: STUDENT IN AN ORGANIZED HEALTH CARE EDUCATION/TRAINING PROGRAM

## 2019-01-05 PROCEDURE — 85014 HEMATOCRIT: CPT | Performed by: STUDENT IN AN ORGANIZED HEALTH CARE EDUCATION/TRAINING PROGRAM

## 2019-01-05 PROCEDURE — 36415 COLL VENOUS BLD VENIPUNCTURE: CPT | Performed by: EMERGENCY MEDICINE

## 2019-01-05 RX ORDER — HYDROMORPHONE HCL/PF 1 MG/ML
0.5 SYRINGE (ML) INJECTION ONCE
Status: COMPLETED | OUTPATIENT
Start: 2019-01-05 | End: 2019-01-05

## 2019-01-05 RX ORDER — ONDANSETRON 2 MG/ML
4 INJECTION INTRAMUSCULAR; INTRAVENOUS ONCE
Status: COMPLETED | OUTPATIENT
Start: 2019-01-05 | End: 2019-01-05

## 2019-01-05 RX ORDER — ONDANSETRON 2 MG/ML
4 INJECTION INTRAMUSCULAR; INTRAVENOUS EVERY 6 HOURS PRN
Status: DISCONTINUED | OUTPATIENT
Start: 2019-01-05 | End: 2019-01-07 | Stop reason: HOSPADM

## 2019-01-05 RX ORDER — LEVOFLOXACIN 5 MG/ML
750 INJECTION, SOLUTION INTRAVENOUS EVERY 24 HOURS
Status: DISCONTINUED | OUTPATIENT
Start: 2019-01-05 | End: 2019-01-07 | Stop reason: HOSPADM

## 2019-01-05 RX ORDER — PANTOPRAZOLE SODIUM 40 MG/1
40 INJECTION, POWDER, FOR SOLUTION INTRAVENOUS EVERY 12 HOURS SCHEDULED
Status: DISCONTINUED | OUTPATIENT
Start: 2019-01-05 | End: 2019-01-05

## 2019-01-05 RX ORDER — METHYLPREDNISOLONE SODIUM SUCCINATE 125 MG/2ML
125 INJECTION, POWDER, LYOPHILIZED, FOR SOLUTION INTRAMUSCULAR; INTRAVENOUS ONCE
Status: COMPLETED | OUTPATIENT
Start: 2019-01-05 | End: 2019-01-05

## 2019-01-05 RX ORDER — MAGNESIUM HYDROXIDE/ALUMINUM HYDROXICE/SIMETHICONE 120; 1200; 1200 MG/30ML; MG/30ML; MG/30ML
30 SUSPENSION ORAL ONCE
Status: COMPLETED | OUTPATIENT
Start: 2019-01-05 | End: 2019-01-05

## 2019-01-05 RX ORDER — PANTOPRAZOLE SODIUM 40 MG/1
40 INJECTION, POWDER, FOR SOLUTION INTRAVENOUS EVERY 12 HOURS SCHEDULED
Status: DISCONTINUED | OUTPATIENT
Start: 2019-01-05 | End: 2019-01-07 | Stop reason: HOSPADM

## 2019-01-05 RX ORDER — SODIUM CHLORIDE 9 MG/ML
75 INJECTION, SOLUTION INTRAVENOUS CONTINUOUS
Status: DISCONTINUED | OUTPATIENT
Start: 2019-01-05 | End: 2019-01-07 | Stop reason: HOSPADM

## 2019-01-05 RX ADMIN — METHYLPREDNISOLONE SODIUM SUCCINATE 125 MG: 125 INJECTION, POWDER, FOR SOLUTION INTRAMUSCULAR; INTRAVENOUS at 19:25

## 2019-01-05 RX ADMIN — PANTOPRAZOLE SODIUM 40 MG: 40 INJECTION, POWDER, FOR SOLUTION INTRAVENOUS at 20:03

## 2019-01-05 RX ADMIN — MORPHINE SULFATE 2 MG: 2 INJECTION, SOLUTION INTRAMUSCULAR; INTRAVENOUS at 23:26

## 2019-01-05 RX ADMIN — IOHEXOL 80 ML: 350 INJECTION, SOLUTION INTRAVENOUS at 17:08

## 2019-01-05 RX ADMIN — HYDROMORPHONE HYDROCHLORIDE 0.5 MG: 1 INJECTION, SOLUTION INTRAMUSCULAR; INTRAVENOUS; SUBCUTANEOUS at 18:17

## 2019-01-05 RX ADMIN — LIDOCAINE HYDROCHLORIDE 15 ML: 20 SOLUTION ORAL; TOPICAL at 18:46

## 2019-01-05 RX ADMIN — HYDROMORPHONE HYDROCHLORIDE 0.5 MG: 1 INJECTION, SOLUTION INTRAMUSCULAR; INTRAVENOUS; SUBCUTANEOUS at 20:28

## 2019-01-05 RX ADMIN — SODIUM CHLORIDE 1000 ML: 0.9 INJECTION, SOLUTION INTRAVENOUS at 16:47

## 2019-01-05 RX ADMIN — PIPERACILLIN SODIUM,TAZOBACTAM SODIUM 3.38 G: 3; .375 INJECTION, POWDER, FOR SOLUTION INTRAVENOUS at 19:24

## 2019-01-05 RX ADMIN — ALUMINUM HYDROXIDE, MAGNESIUM HYDROXIDE, AND SIMETHICONE 30 ML: 200; 200; 20 SUSPENSION ORAL at 18:45

## 2019-01-05 RX ADMIN — LEVOFLOXACIN 750 MG: 5 INJECTION, SOLUTION INTRAVENOUS at 22:03

## 2019-01-05 RX ADMIN — ONDANSETRON 4 MG: 2 INJECTION INTRAMUSCULAR; INTRAVENOUS at 16:47

## 2019-01-05 RX ADMIN — HYDROMORPHONE HYDROCHLORIDE 0.5 MG: 1 INJECTION, SOLUTION INTRAMUSCULAR; INTRAVENOUS; SUBCUTANEOUS at 16:47

## 2019-01-05 RX ADMIN — SODIUM CHLORIDE 75 ML/HR: 0.9 INJECTION, SOLUTION INTRAVENOUS at 22:03

## 2019-01-05 NOTE — ED PROVIDER NOTES
History  Chief Complaint   Patient presents with    Abdominal Pain     patient states he began with abdominal pain about four hours ago with vomiting bright red blood as well as bright red blood in his stool  History provided by:  Patient   used: No    Abdominal Pain   Pain location:  LUQ  Pain quality: aching    Pain radiates to:  Does not radiate  Pain severity:  Severe  Onset quality:  Sudden  Duration:  4 hours  Timing:  Constant  Progression:  Unchanged  Chronicity:  New  Context: previous surgery    Relieved by:  None tried  Worsened by:  Nothing  Ineffective treatments:  None tried  Associated symptoms: diarrhea, hematemesis, hematochezia, nausea and vomiting    Associated symptoms: no chest pain, no chills, no constipation, no dysuria, no fatigue, no fever, no shortness of breath and no sore throat    Risk factors: multiple surgeries        Prior to Admission Medications   Prescriptions Last Dose Informant Patient Reported? Taking? Ergocalciferol (VITAMIN D2 PO) Not Taking at Unknown time  Yes No   Sig: Vitamin D2 50,000 unit capsule   psyllium (METAMUCIL) 58 6 % powder 1/5/2019 at Unknown time  Yes Yes   Sig: Take 1 packet by mouth 3 (three) times a day   sertraline (ZOLOFT) 50 mg tablet 1/5/2019 at Unknown time  No Yes   Sig: Take 1 tablet (50 mg total) by mouth daily      Facility-Administered Medications: None       Past Medical History:   Diagnosis Date    Colitis     Pancreatitis        Past Surgical History:   Procedure Laterality Date    TOTAL COLECTOMY         History reviewed  No pertinent family history  I have reviewed and agree with the history as documented  Social History   Substance Use Topics    Smoking status: Never Smoker    Smokeless tobacco: Never Used    Alcohol use Yes      Comment: social        Review of Systems   Constitutional: Negative for activity change, appetite change, chills, fatigue and fever     HENT: Negative for congestion, dental problem, facial swelling, sore throat, tinnitus and trouble swallowing  Eyes: Negative for pain, discharge and itching  Respiratory: Negative for apnea, chest tightness, shortness of breath and wheezing  Cardiovascular: Negative for chest pain, palpitations and leg swelling  Gastrointestinal: Positive for abdominal pain, blood in stool, diarrhea, hematemesis, hematochezia, nausea and vomiting  Negative for constipation  Genitourinary: Negative for difficulty urinating, dysuria and flank pain  Musculoskeletal: Negative for arthralgias, back pain, gait problem, joint swelling and neck pain  Skin: Negative for color change, rash and wound  Neurological: Negative for dizziness and facial asymmetry  Psychiatric/Behavioral: Negative for agitation and behavioral problems  The patient is not nervous/anxious  All other systems reviewed and are negative  Physical Exam  Physical Exam   Constitutional: He is oriented to person, place, and time  He appears well-developed and well-nourished  No distress  HENT:   Head: Normocephalic and atraumatic  Right Ear: External ear normal    Left Ear: External ear normal    Eyes: Pupils are equal, round, and reactive to light  EOM are normal  Right eye exhibits no discharge  Left eye exhibits no discharge  Neck: Normal range of motion  Neck supple  No tracheal deviation present  No thyromegaly present  Cardiovascular: Regular rhythm  Tachycardia present  No murmur heard  Pulmonary/Chest: Effort normal and breath sounds normal    Abdominal: Soft  Bowel sounds are normal  He exhibits no distension  There is tenderness  Diffuse abdominal tenderness, worse in left upper quadrant   Genitourinary:   Genitourinary Comments: Rectal exam negative for gross blood   Musculoskeletal: Normal range of motion  He exhibits no edema or deformity  Neurological: He is alert and oriented to person, place, and time  No cranial nerve deficit   He exhibits normal muscle tone    Skin: Skin is warm  Capillary refill takes less than 2 seconds  He is not diaphoretic  Psychiatric: He has a normal mood and affect  His behavior is normal    Nursing note and vitals reviewed        Vital Signs  ED Triage Vitals [01/05/19 1624]   Temperature Pulse Respirations Blood Pressure SpO2   100 2 °F (37 9 °C) (!) 125 18 128/67 96 %      Temp Source Heart Rate Source Patient Position - Orthostatic VS BP Location FiO2 (%)   Tympanic Monitor Lying Right arm --      Pain Score       9           Vitals:    01/05/19 1624 01/05/19 2116   BP: 128/67 126/67   Pulse: (!) 125 105   Patient Position - Orthostatic VS: Lying Lying       Visual Acuity      ED Medications  Medications   sertraline (ZOLOFT) tablet 50 mg (not administered)   sodium chloride 0 9 % infusion (75 mL/hr Intravenous New Bag 1/5/19 2203)   ondansetron (ZOFRAN) injection 4 mg (not administered)   levofloxacin (LEVAQUIN) IVPB (premix) 750 mg (0 mg Intravenous Stopped 1/6/19 0003)   metroNIDAZOLE (FLAGYL) IVPB (premix) 500 mg (500 mg Intravenous New Bag 1/6/19 0003)   pantoprazole (PROTONIX) injection 40 mg (40 mg Intravenous Given 1/5/19 2003)   morphine injection 2 mg (2 mg Intravenous Given 1/5/19 2326)   HYDROmorphone (DILAUDID) injection 0 5 mg (0 5 mg Intravenous Given 1/5/19 1647)   ondansetron (ZOFRAN) injection 4 mg (4 mg Intravenous Given 1/5/19 1647)   sodium chloride 0 9 % bolus 1,000 mL (0 mL Intravenous Stopped 1/5/19 1922)   iohexol (OMNIPAQUE) 350 MG/ML injection (MULTI-DOSE) 80 mL (80 mL Intravenous Given 1/5/19 1708)   HYDROmorphone (DILAUDID) injection 0 5 mg (0 5 mg Intravenous Given 1/5/19 1817)   aluminum-magnesium hydroxide-simethicone (MYLANTA) 200-200-20 mg/5 mL oral suspension 30 mL (30 mL Oral Given 1/5/19 1845)   lidocaine viscous (XYLOCAINE) 2 % mucosal solution 15 mL (15 mL Swish & Swallow Given 1/5/19 1846)   methylPREDNISolone sodium succinate (Solu-MEDROL) injection 125 mg (125 mg Intravenous Given 1/5/19 1925) piperacillin-tazobactam (ZOSYN) IVPB 3 375 g (0 g Intravenous Stopped 1/5/19 1954)   HYDROmorphone (DILAUDID) injection 0 5 mg (0 5 mg Intravenous Given 1/5/19 2028)       Diagnostic Studies  Results Reviewed     Procedure Component Value Units Date/Time    Fecal leukocytes [[id number]] Collected:  01/06/19 0001    Lab Status: In process Specimen:  Stool from Per Rectum Updated:  01/06/19 0007    Stool Enteric Bacterial Panel by PCR [[id number]] Collected:  01/06/19 0001    Lab Status: In process Specimen:  Stool from Rectum Updated:  01/06/19 0007    Clostridium difficile toxin by PCR [[id number]] Collected:  01/05/19 2352    Lab Status: In process Specimen:  Stool from Per Rectum Updated:  01/06/19 0007    Ova and parasite examination [[id number]] Collected:  01/05/19 2352    Lab Status:   In process Specimen:  Stool from Rectum Updated:  01/06/19 0007    Urinalysis with microscopic [[id number]]  (Abnormal) Collected:  01/05/19 2339    Lab Status:  Final result Specimen:  Urine from Urine, Clean Catch Updated:  01/06/19 0006     Clarity, UA Clear     Color, UA Yellow     Specific Gravity, UA <=1 005     pH, UA 6 5     Glucose, UA Negative mg/dl      Ketones, UA Negative mg/dl      Blood, UA Trace-lysed (A)     Protein, UA Negative mg/dl      Nitrite, UA Negative     Bilirubin, UA Negative     Urobilinogen, UA 0 2 E U /dl      Leukocytes, UA Negative     WBC, UA None Seen /hpf      RBC, UA None Seen /hpf      Bacteria, UA Occasional /hpf      Epithelial Cells None Seen /hpf     Procalcitonin [[id number]]  (Normal) Collected:  01/05/19 1748    Lab Status:  Final result Specimen:  Blood from Arm, Right Updated:  01/05/19 2317     Procalcitonin 0 17 ng/ml     C-reactive protein [[id number]]  (Abnormal) Collected:  01/05/19 1633    Lab Status:  Final result Specimen:  Blood Updated:  01/05/19 2301     CRP 3 3 (H) mg/L     Sedimentation rate, automated [[id number]]  (Normal) Collected:  01/05/19 1633    Lab Status:  Final result Specimen:  Blood Updated:  01/05/19 2121     Sed Rate 2 mm/hour     Hemoglobin and hematocrit, blood [[id number]]  (Normal) Collected:  01/05/19 2041    Lab Status:  Final result Specimen:  Blood from Arm, Left Updated:  01/05/19 2048     Hemoglobin 13 9 g/dL      Hematocrit 47 0 %     CK (with reflex to MB) [[id number]]  (Normal) Collected:  01/05/19 1633    Lab Status:  Final result Specimen:  Blood from Arm, Left Updated:  01/05/19 2026     Total  U/L     UA w Reflex to Microscopic w Reflex to Culture [[id number]] Collected:  01/05/19 1650    Lab Status:  Final result Specimen:  Urine from Urine, Clean Catch Updated:  01/05/19 2019     Color, UA Yellow     Clarity, UA Clear     Specific Gravity, UA <=1 005     pH, UA 6 5     Leukocytes, UA Negative     Nitrite, UA Negative     Protein, UA Negative mg/dl      Glucose, UA Negative mg/dl      Ketones, UA Negative mg/dl      Urobilinogen, UA 0 2 E U /dl      Bilirubin, UA Negative     Blood, UA Negative    Lactic acid x2 [[id number]]  (Normal) Collected:  01/05/19 1748    Lab Status:  Final result Specimen:  Blood from Arm, Right Updated:  01/05/19 1816     LACTIC ACID 1 7 mmol/L     Narrative:         Result may be elevated if tourniquet was used during collection  Blood culture #2 [[id number]] Collected:  01/05/19 1748    Lab Status: In process Specimen:  Blood from Hand, Right Updated:  01/05/19 1753    Blood culture #1 [[id number]] Collected:  01/05/19 1748    Lab Status:   In process Specimen:  Blood from Arm, Right Updated:  01/05/19 1753    Lipase [[id number]]  (Normal) Collected:  01/05/19 1633    Lab Status:  Final result Specimen:  Blood from Arm, Left Updated:  01/05/19 1705     Lipase 110 u/L     Protime-INR [[id number]]  (Normal) Collected:  01/05/19 1633    Lab Status:  Final result Specimen:  Blood from Arm, Left Updated:  01/05/19 1658     Protime 10 0 seconds      INR 0 95    Comprehensive metabolic panel [[id number]]  (Abnormal) Collected: 01/05/19 1633    Lab Status:  Final result Specimen:  Blood from Arm, Left Updated:  01/05/19 1656     Sodium 134 (L) mmol/L      Potassium 3 8 mmol/L      Chloride 95 (L) mmol/L      CO2 29 mmol/L      ANION GAP 10 mmol/L      BUN 13 mg/dL      Creatinine 1 61 (H) mg/dL      Glucose 108 mg/dL      Calcium 10 0 mg/dL      AST 36 U/L      ALT 60 U/L      Alkaline Phosphatase 110 U/L      Total Protein 9 1 (H) g/dL      Albumin 4 5 g/dL      Total Bilirubin 1 10 (H) mg/dL      eGFR 59 ml/min/1 73sq m     Narrative:         National Kid[employer] Disease Education Program recommendations are as follows:  GFR calculation is accurate only with a steady state creatinine  Chronic Kidney disease less than 60 ml/min/1 73 sq  meters  Kidney failure less than 15 ml/min/1 73 sq  meters  CBC and differential [[id number]]  (Abnormal) Collected:  01/05/19 1633    Lab Status:  Final result Specimen:  Blood from Arm, Left Updated:  01/05/19 1640     WBC 28 90 (H) Thousand/uL      RBC 7 84 (H) Million/uL      Hemoglobin 15 1 g/dL      Hematocrit 51 2 (H) %      MCV 65 (L) fL      MCH 19 3 (L) pg      MCHC 29 5 (L) g/dL      RDW 19 0 (H) %      MPV 8 7 (L) fL      Platelets 801 (H) Thousands/uL      nRBC 0 /100 WBCs      Neutrophils Relative 88 (H) %      Immat GRANS % 1 %      Lymphocytes Relative 4 (L) %      Monocytes Relative 6 %      Eosinophils Relative 1 %      Basophils Relative 0 %      Neutrophils Absolute 25 53 (H) Thousands/µL      Immature Grans Absolute 0 15 Thousand/uL      Lymphocytes Absolute 1 26 Thousands/µL      Monocytes Absolute 1 71 (H) Thousand/µL      Eosinophils Absolute 0 14 Thousand/µL      Basophils Absolute 0 11 (H) Thousands/µL                  CT abdomen pelvis with contrast   Final Result by Reta Terrazas MD (01/05 1725)   1  Stable postsurgical changes, status post total colectomy  Mild wall thickening of the J-pouch and distal small bowel appears similar    Fluid and air-filled loops of small bowel without definite obstruction  Otherwise no acute intra-abdominal    pathology  Workstation performed: [id number]         XR chest pa & lateral    (Results Pending)              Procedures  Procedures       Phone Contacts  ED Phone Contact    ED Course                               MDM  Number of Diagnoses or Management Options  Abdominal pain: new and requires workup  Leukocytosis: new and requires workup  Diagnosis management comments: Patient with history of ulcerative colitis, colon resection presents emergency department for evaluation of left upper quadrant pain, nausea and vomiting, blood in emesis and blood in stool  Symptoms started roughly 4-5 hours ago  Temperature 100 2°, tachycardic to 120, likely secondary to pain  Abdomen with diffuse tenderness, worse in left upper quadrant  Laboratory studies revealed leukocytosis to 28, CT scan with similar findings to prior, possible inflammation jejunal pouch  Lactate normal   Patient given IV fluids, pain control, nausea control, GI cocktail  Discussed case with hospitalist for admission due to leukocytosis, acute kidney injury  Hospitalist recommended Zosyn and steroids which were ordered and given to patient emergency department  C diff pending  Patient admitted to hospitalist service for IV fluids or further evaluation, possible specialist consultation         Amount and/or Complexity of Data Reviewed  Clinical lab tests: ordered and reviewed  Tests in the radiology section of CPT®: ordered and reviewed  Tests in the medicine section of CPT®: ordered and reviewed  Review and summarize past medical records: yes  Discuss the patient with other providers: yes  Independent visualization of images, tracings, or specimens: yes    Risk of Complications, Morbidity, and/or Mortality  Presenting problems: moderate  Diagnostic procedures: moderate  Management options: moderate    Patient Progress  Patient progress: improved    CritCare Time    Disposition  Final diagnoses:   Abdominal pain   Leukocytosis     Time reflects when diagnosis was documented in both MDM as applicable and the Disposition within this note     Time User Action Codes Description Comment    1/5/2019  6:49 PM Cayla rCoss Add [R10 9] Abdominal pain     1/5/2019  6:49 PM Kings Generous [D72 829] Leukocytosis     1/5/2019  7:21 PM Xi Beny A Modify [R10 9] Abdominal pain     1/5/2019  7:21 PM Xi Beny A Modify [I21 [id number]] Leukocytosis     1/5/2019  7:21 PM Xi Beny A Add [R10 9] Abdominal pain with hx of Ulcerative Colitis     1/5/2019  7:21 PM Xi Beny A Modify [R10 9] Abdominal pain     1/5/2019  7:21 PM Xi Beny A Modify [J78 [id number]] Leukocytosis     1/5/2019  7:21 PM Xi Beny A Modify [R10 9] Abdominal pain with hx of Ulcerative Colitis     1/5/2019  7:21 PM Xi Beny A Modify [R10 9] Abdominal pain     1/5/2019  7:21 PM Xi Beny A Modify [F56 [id number]] Leukocytosis     1/5/2019  7:21 PM Xi Beny A Modify [R10 9] Abdominal pain     1/5/2019  7:21 PM Xi Beny A Modify [X45 [id number]] Leukocytosis       ED Disposition     ED Disposition Condition Comment    Admit  Case was discussed with Dr Burton Dupree and the patient's admission status was agreed to be Admission Status: inpatient status to the service of Dr Burton Dupree   Follow-up Information    None         Current Discharge Medication List      CONTINUE these medications which have NOT CHANGED    Details   psyllium (METAMUCIL) 58 6 % powder Take 1 packet by mouth 3 (three) times a day      sertraline (ZOLOFT) 50 mg tablet Take 1 tablet (50 mg total) by mouth daily  Qty: 90 tablet, Refills: 0    Comments: Replaces lexapro  Associated Diagnoses: CAR (generalized anxiety disorder)      Ergocalciferol (VITAMIN D2 PO) Vitamin D2 50,000 unit capsule           No discharge procedures on file      ED Provider  Electronically Signed by           Nilo Huber MD  01/06/19 0986

## 2019-01-06 PROBLEM — N17.9 AKI (ACUTE KIDNEY INJURY) (HCC): Status: RESOLVED | Noted: 2019-01-05 | Resolved: 2019-01-06

## 2019-01-06 PROBLEM — R17 SERUM TOTAL BILIRUBIN ELEVATED: Status: RESOLVED | Noted: 2019-01-05 | Resolved: 2019-01-06

## 2019-01-06 LAB
ALBUMIN SERPL BCP-MCNC: 3.7 G/DL (ref 3.5–5)
ALP SERPL-CCNC: 91 U/L (ref 46–116)
ALT SERPL W P-5'-P-CCNC: 42 U/L (ref 12–78)
ANION GAP SERPL CALCULATED.3IONS-SCNC: 8 MMOL/L (ref 4–13)
AST SERPL W P-5'-P-CCNC: 22 U/L (ref 5–45)
BACTERIA UR QL AUTO: ABNORMAL /HPF
BASOPHILS # BLD AUTO: 0.01 THOUSANDS/ΜL (ref 0–0.1)
BASOPHILS NFR BLD AUTO: 0 % (ref 0–1)
BILIRUB SERPL-MCNC: 0.9 MG/DL (ref 0.2–1)
BILIRUB UR QL STRIP: NEGATIVE
BUN SERPL-MCNC: 16 MG/DL (ref 5–25)
CALCIUM SERPL-MCNC: 8.8 MG/DL (ref 8.3–10.1)
CHLORIDE SERPL-SCNC: 96 MMOL/L (ref 100–108)
CLARITY UR: CLEAR
CO2 SERPL-SCNC: 27 MMOL/L (ref 21–32)
COLOR UR: YELLOW
CREAT SERPL-MCNC: 1.3 MG/DL (ref 0.6–1.3)
EOSINOPHIL # BLD AUTO: 0 THOUSAND/ΜL (ref 0–0.61)
EOSINOPHIL NFR BLD AUTO: 0 % (ref 0–6)
ERYTHROCYTE [DISTWIDTH] IN BLOOD BY AUTOMATED COUNT: 17.9 % (ref 11.6–15.1)
GFR SERPL CREATININE-BSD FRML MDRD: 76 ML/MIN/1.73SQ M
GLUCOSE SERPL-MCNC: 133 MG/DL (ref 65–140)
GLUCOSE UR STRIP-MCNC: NEGATIVE MG/DL
HCT VFR BLD AUTO: 45.7 % (ref 36.5–49.3)
HGB BLD-MCNC: 13.1 G/DL (ref 12–17)
HGB UR QL STRIP.AUTO: ABNORMAL
IMM GRANULOCYTES # BLD AUTO: 0.09 THOUSAND/UL (ref 0–0.2)
IMM GRANULOCYTES NFR BLD AUTO: 1 % (ref 0–2)
KETONES UR STRIP-MCNC: NEGATIVE MG/DL
LEUKOCYTE ESTERASE UR QL STRIP: NEGATIVE
LYMPHOCYTES # BLD AUTO: 0.74 THOUSANDS/ΜL (ref 0.6–4.47)
LYMPHOCYTES NFR BLD AUTO: 5 % (ref 14–44)
MCH RBC QN AUTO: 18.8 PG (ref 26.8–34.3)
MCHC RBC AUTO-ENTMCNC: 28.7 G/DL (ref 31.4–37.4)
MCV RBC AUTO: 66 FL (ref 82–98)
MONOCYTES # BLD AUTO: 0.08 THOUSAND/ΜL (ref 0.17–1.22)
MONOCYTES NFR BLD AUTO: 1 % (ref 4–12)
NEUTROPHILS # BLD AUTO: 13.86 THOUSANDS/ΜL (ref 1.85–7.62)
NEUTS SEG NFR BLD AUTO: 93 % (ref 43–75)
NITRITE UR QL STRIP: NEGATIVE
NON-SQ EPI CELLS URNS QL MICRO: ABNORMAL /HPF
NRBC BLD AUTO-RTO: 0 /100 WBCS
PH UR STRIP.AUTO: 6.5 [PH] (ref 5–9)
PLATELET # BLD AUTO: 451 THOUSANDS/UL (ref 149–390)
PMV BLD AUTO: 9.2 FL (ref 8.9–12.7)
POTASSIUM SERPL-SCNC: 4.7 MMOL/L (ref 3.5–5.3)
PROT SERPL-MCNC: 7.8 G/DL (ref 6.4–8.2)
PROT UR STRIP-MCNC: NEGATIVE MG/DL
RBC # BLD AUTO: 6.97 MILLION/UL (ref 3.88–5.62)
RBC #/AREA URNS AUTO: ABNORMAL /HPF
SODIUM SERPL-SCNC: 131 MMOL/L (ref 136–145)
SP GR UR STRIP.AUTO: <=1.005 (ref 1–1.03)
UROBILINOGEN UR QL STRIP.AUTO: 0.2 E.U./DL
WBC # BLD AUTO: 14.78 THOUSAND/UL (ref 4.31–10.16)
WBC #/AREA URNS AUTO: ABNORMAL /HPF

## 2019-01-06 PROCEDURE — 85025 COMPLETE CBC W/AUTO DIFF WBC: CPT | Performed by: STUDENT IN AN ORGANIZED HEALTH CARE EDUCATION/TRAINING PROGRAM

## 2019-01-06 PROCEDURE — C9113 INJ PANTOPRAZOLE SODIUM, VIA: HCPCS | Performed by: STUDENT IN AN ORGANIZED HEALTH CARE EDUCATION/TRAINING PROGRAM

## 2019-01-06 PROCEDURE — 80053 COMPREHEN METABOLIC PANEL: CPT | Performed by: STUDENT IN AN ORGANIZED HEALTH CARE EDUCATION/TRAINING PROGRAM

## 2019-01-06 PROCEDURE — 99254 IP/OBS CNSLTJ NEW/EST MOD 60: CPT | Performed by: INTERNAL MEDICINE

## 2019-01-06 PROCEDURE — 87505 NFCT AGENT DETECTION GI: CPT | Performed by: STUDENT IN AN ORGANIZED HEALTH CARE EDUCATION/TRAINING PROGRAM

## 2019-01-06 PROCEDURE — 99232 SBSQ HOSP IP/OBS MODERATE 35: CPT | Performed by: STUDENT IN AN ORGANIZED HEALTH CARE EDUCATION/TRAINING PROGRAM

## 2019-01-06 PROCEDURE — 89055 LEUKOCYTE ASSESSMENT FECAL: CPT | Performed by: STUDENT IN AN ORGANIZED HEALTH CARE EDUCATION/TRAINING PROGRAM

## 2019-01-06 RX ADMIN — LEVOFLOXACIN 750 MG: 5 INJECTION, SOLUTION INTRAVENOUS at 21:22

## 2019-01-06 RX ADMIN — SERTRALINE HYDROCHLORIDE 50 MG: 50 TABLET ORAL at 09:08

## 2019-01-06 RX ADMIN — PANTOPRAZOLE SODIUM 40 MG: 40 INJECTION, POWDER, FOR SOLUTION INTRAVENOUS at 21:22

## 2019-01-06 RX ADMIN — METRONIDAZOLE 500 MG: 500 INJECTION, SOLUTION INTRAVENOUS at 16:36

## 2019-01-06 RX ADMIN — METRONIDAZOLE 500 MG: 500 INJECTION, SOLUTION INTRAVENOUS at 00:03

## 2019-01-06 RX ADMIN — PANTOPRAZOLE SODIUM 40 MG: 40 INJECTION, POWDER, FOR SOLUTION INTRAVENOUS at 09:08

## 2019-01-06 RX ADMIN — MORPHINE SULFATE 2 MG: 2 INJECTION, SOLUTION INTRAMUSCULAR; INTRAVENOUS at 03:06

## 2019-01-06 RX ADMIN — METRONIDAZOLE 500 MG: 500 INJECTION, SOLUTION INTRAVENOUS at 23:58

## 2019-01-06 RX ADMIN — METRONIDAZOLE 500 MG: 500 INJECTION, SOLUTION INTRAVENOUS at 07:05

## 2019-01-06 NOTE — CONSULTS
Consultation - [address] [facility]  Geneva Esparza 20 y o  male MRN: [id number]  Unit/Bed#: 2 [place] [address] Encounter: [id number]    Inpatient consult to gastroenterology  Consult performed by: Linda Maynard ordered by: Wale Verma      Reason for Consult / Principal Problem: abdominal pain     ASSESSMENT AND PLAN:      N/V  Abdominal pain  Diarrhea/hematochezia  -Neda Hurtado is a 21 y/o with a history of UC s/p total colectomy with J pouch in 2015 who follows at [facility]  Generally he has done well since his colectomy however has had intermittent obstructions as well as 2 episodes of cdiff (last episode 1 year ago)  -He presents with dilated small bowel on CT without obstruction, he may have a resolving or partial obstruction from [facility] now is asymptomatic and tolerating clear liquids  Low suspicion for underlying crohns given unremarkable inflammatory markers and resolution of symptoms  -recommend following up cdiff given his history also with leukocytosis  -trial diet advancement to low residue diet  -recommend close follow-up with his gastroenterologist in [place]    ______________________________________________________________________    HPI:  Naomy Nicole is a 21 y/o male with a PMH of UC and anxiety who presented with abdominal pain, hematemesis, bloody stools  He was diagnosed with UC at age 21 he followed at [facility]  He was on remicade, humira, entyvio, failed these and then ultimately had colectomy in 2015 with j pouch  He states he has been doing well however he has had 2 episodes of cdiff, he also has had intermittent obstructions since his surgery last was about 1 year ago  He states yesterday he started with abdominal pain and then started having multiple episodes of vomiting with some blood tinge after vomiting many times  He also stated he had abdominal distension and had 2 loose bms that he states contained bright red blood   He denies sick contacts, fever, chills, eating new foods, recent antibiotics  CT showed stable postsurgical changes s/p total colectomy, mild wall thickening of the j pouch and distal small bowel this is chronic from 2/2017, he had fluid and air filled bowel loops without definite obstruction  White count was significantly elevated at 29 on admission, is 15 today  CRP and sed rate are unremarkable  Cdiff is pending  He is asymptomatic today  He had 2 formed bowel movements without blood  He wishes to go home     REVIEW OF SYSTEMS:  CONSTITUTIONAL: Denies any fever, chills, rigors, and weight loss  HEENT: No earache or tinnitus  Denies hearing loss or visual disturbances  CARDIOVASCULAR: No chest pain or palpitations  RESPIRATORY: Denies any cough, hemoptysis, shortness of breath or dyspnea on exertion  GASTROINTESTINAL: As noted in the History of Present Illness  GENITOURINARY: No problems with urination  Denies any hematuria or dysuria  NEUROLOGIC: No dizziness or vertigo, denies headaches  MUSCULOSKELETAL: Denies any muscle or joint pain  SKIN: Denies skin rashes or itching  ENDOCRINE: Denies excessive thirst  Denies intolerance to heat or cold  PSYCHOSOCIAL: Denies depression or anxiety  Denies any recent memory loss  Historical Information   Past Medical History:   Diagnosis Date    Colitis     Pancreatitis      Past Surgical History:   Procedure Laterality Date    TOTAL COLECTOMY       Social History   History   Alcohol Use    Yes     Comment: social     History   Drug Use No     History   Smoking Status    Never Smoker   Smokeless Tobacco    Never Used     History reviewed  No pertinent family history      Meds/Allergies     Prescriptions Prior to Admission   Medication    psyllium (METAMUCIL) 58 6 % powder    sertraline (ZOLOFT) 50 mg tablet    Ergocalciferol (VITAMIN D2 PO)     Current Facility-Administered Medications   Medication Dose Route Frequency    levofloxacin (LEVAQUIN) IVPB (premix) 750 mg  750 mg Intravenous Q24H  metroNIDAZOLE (FLAGYL) IVPB (premix) 500 mg  500 mg Intravenous Q8H    morphine injection 2 mg  2 mg Intravenous Q4H PRN    ondansetron (ZOFRAN) injection 4 mg  4 mg Intravenous Q6H PRN    pantoprazole (PROTONIX) injection 40 mg  40 mg Intravenous Q12H [address]    sertraline (ZOLOFT) tablet 50 mg  50 mg Oral Daily    sodium chloride 0 9 % infusion  75 mL/hr Intravenous Continuous       Allergies   Allergen Reactions    Mesalamine GI Intolerance     Annotation - [id number]: pancreatitis       Objective     Blood pressure 135/64, pulse 81, temperature 97 6 °F (36 4 °C), temperature source Oral, resp  rate 18, height 5' 9" (1 753 m), weight 86 4 kg (190 lb 7 6 oz), SpO2 96 %  Body mass index is 28 13 kg/m²  Intake/Output Summary (Last 24 hours) at 01/06/19 1255  Last data filed at 01/06/19 0705   Gross per 24 hour   Intake             2210 ml   Output              500 ml   Net             1710 ml     PHYSICAL EXAM:    General Appearance:   Alert, cooperative, no distress   HEENT:   Normocephalic, atraumatic, anicteric      Neck:  Supple, symmetrical, trachea midline   Lungs:   Clear to auscultation bilaterally; no rales, rhonchi or wheezing; respirations unlabored    Heart[de-identified]   Regular rate and rhythm; no murmur, rub, or gallop     Abdomen:   Soft, scars non-tender, non-distended; normal bowel sounds   Genitalia:   Deferred    Rectal:   Deferred    Extremities:  No cyanosis, clubbing or edema    Pulses:  2+ and symmetric all extremities    Skin:  No jaundice, rashes, or lesions    Lymph nodes:  No palpable cervical lymphadenopathy        Lab Results:   Admission on 01/05/2019   Component Date Value    WBC 01/05/2019 28 90*    RBC 01/05/2019 7 84*    Hemoglobin 01/05/2019 15 1     Hematocrit 01/05/2019 51 2*    MCV 01/05/2019 65*    MCH 01/05/2019 19 3*    MCHC 01/05/2019 29 5*    RDW 01/05/2019 19 0*    MPV 01/05/2019 8 7*    Platelets 36/38/8803 497*    nRBC 01/05/2019 0     Neutrophils Relative 01/05/2019 88*    Immat GRANS % 01/05/2019 1     Lymphocytes Relative 01/05/2019 4*    Monocytes Relative 01/05/2019 6     Eosinophils Relative 01/05/2019 1     Basophils Relative 01/05/2019 0     Neutrophils Absolute 01/05/2019 25 53*    Immature Grans Absolute 01/05/2019 0 15     Lymphocytes Absolute 01/05/2019 1 26     Monocytes Absolute 01/05/2019 1 71*    Eosinophils Absolute 01/05/2019 0 14     Basophils Absolute 01/05/2019 0 11*    Sodium 01/05/2019 134*    Potassium 01/05/2019 3 8     Chloride 01/05/2019 95*    CO2 01/05/2019 29     ANION GAP 01/05/2019 10     BUN 01/05/2019 13     Creatinine 01/05/2019 1 61*    Glucose 01/05/2019 108     Calcium 01/05/2019 10 0     AST 01/05/2019 36     ALT 01/05/2019 60     Alkaline Phosphatase 01/05/2019 110     Total Protein 01/05/2019 9 1*    Albumin 01/05/2019 4 5     Total Bilirubin 01/05/2019 1 10*    eGFR 01/05/2019 59     Protime 01/05/2019 10 0     INR 01/05/2019 0 95     Color, UA 01/05/2019 Yellow     Clarity, UA 01/05/2019 Clear     Specific Gravity, UA 01/05/2019 <=1 005     pH, UA 01/05/2019 6 5     Leukocytes, UA 01/05/2019 Negative     Nitrite, UA 01/05/2019 Negative     Protein, UA 01/05/2019 Negative     Glucose, UA 01/05/2019 Negative     Ketones, UA 01/05/2019 Negative     Urobilinogen, UA 01/05/2019 0 2     Bilirubin, UA 01/05/2019 Negative     Blood, UA 01/05/2019 Negative     Lipase 01/05/2019 110     LACTIC ACID 01/05/2019 1 7     Procalcitonin 01/05/2019 0 17     Sed Rate 01/05/2019 2     CRP 01/05/2019 3 3*    Total CK 01/05/2019 151     Clarity, UA 01/05/2019 Clear     Color, UA 01/05/2019 Yellow     Specific Gravity, UA 01/05/2019 <=1 005     pH, UA 01/05/2019 6 5     Glucose, UA 01/05/2019 Negative     Ketones, UA 01/05/2019 Negative     Blood, UA 01/05/2019 Trace-lysed*    Protein, UA 01/05/2019 Negative     Nitrite, UA 01/05/2019 Negative     Bilirubin, UA 01/05/2019 Negative     Urobilinogen, UA 01/05/2019 0 2     Leukocytes, UA 01/05/2019 Negative     WBC, UA 01/05/2019 None Seen     RBC, UA 01/05/2019 None Seen     Bacteria, UA 01/05/2019 Occasional     Epithelial Cells 01/05/2019 None Seen     Hemoglobin 01/05/2019 13 9     Hematocrit 01/05/2019 47 0     WBC 01/06/2019 14 78*    RBC 01/06/2019 6 97*    Hemoglobin 01/06/2019 13 1     Hematocrit 01/06/2019 45 7     MCV 01/06/2019 66*    MCH 01/06/2019 18 8*    MCHC 01/06/2019 28 7*    RDW 01/06/2019 17 9*    MPV 01/06/2019 9 2     Platelets 02/14/5765 451*    nRBC 01/06/2019 0     Neutrophils Relative 01/06/2019 93*    Immat GRANS % 01/06/2019 1     Lymphocytes Relative 01/06/2019 5*    Monocytes Relative 01/06/2019 1*    Eosinophils Relative 01/06/2019 0     Basophils Relative 01/06/2019 0     Neutrophils Absolute 01/06/2019 13 86*    Immature Grans Absolute 01/06/2019 0 09     Lymphocytes Absolute 01/06/2019 0 74     Monocytes Absolute 01/06/2019 0 08*    Eosinophils Absolute 01/06/2019 0 00     Basophils Absolute 01/06/2019 0 01     Sodium 01/06/2019 131*    Potassium 01/06/2019 4 7     Chloride 01/06/2019 96*    CO2 01/06/2019 27     ANION GAP 01/06/2019 8     BUN 01/06/2019 16     Creatinine 01/06/2019 1 30     Glucose 01/06/2019 133     Calcium 01/06/2019 8 8     AST 01/06/2019 22     ALT 01/06/2019 42     Alkaline Phosphatase 01/06/2019 91     Total Protein 01/06/2019 7 8     Albumin 01/06/2019 3 7     Total Bilirubin 01/06/2019 0 90     eGFR 01/06/2019 76        Imaging Studies: I have personally reviewed pertinent imaging studies

## 2019-01-06 NOTE — PLAN OF CARE
GASTROINTESTINAL - ADULT     Minimal or absence of nausea and/or vomiting Progressing     Maintains or returns to baseline bowel function Progressing     Maintains adequate nutritional intake Progressing        HEMATOLOGIC - ADULT     Maintains hematologic stability Progressing        METABOLIC, FLUID AND ELECTROLYTES - ADULT     Electrolytes maintained within normal limits Progressing     Fluid balance maintained Progressing

## 2019-01-06 NOTE — H&P
History and Physical - [facility]    Patient Information: Jocelyne Sofia 20 y o  male MRN: [id number]  Unit/Bed#: ED 09 Encounter: [id number]  Admitting Physician: Ash Macedo MD  PCP: Jozef Ulrich DO  Date of Admission:  01/05/19    Chief Complaint:     Abdominal pain, bloody stools, blood in vomit   of Present Illness:    Jocelyne Sofia is a 20 y o  male with a PMH of Ulcerative Colitis and Anxiety who presents with abdominal pain, hematemesis and bloody stools  He states that he was in his usual state of health until today when he developed LUQ abdominal pain  This was then associated with nausea  He proceeded to vomit several times which then turned slightly bloody  He also noted bloody stools today as well as diarrhea  In regards to his UC he states that he has been doing relatively well and has not had any flareups for over 2 years  Of note, he also reports a hx of repeated C diff infections  Currently he reports mild LUQ abdominal pain and nausea  No other complaints or concerns  Review of Systems:    Review of Systems   Constitutional: Negative for chills and fever  Respiratory: Negative for shortness of breath  Cardiovascular: Negative for chest pain  Gastrointestinal: Positive for abdominal pain, blood in stool, nausea and vomiting  Genitourinary: Negative for hematuria  Neurological: Negative for syncope  Psychiatric/Behavioral: Negative for confusion  Past Medical and Surgical History:     Past Medical History:   Diagnosis Date    Colitis     Pancreatitis        Past Surgical History:   Procedure Laterality Date    TOTAL COLECTOMY         Meds/Allergies:    PTA meds:   Prior to Admission Medications   Prescriptions Last Dose Informant Patient Reported? Taking?    Ergocalciferol (VITAMIN D2 PO)   Yes No   Sig: Vitamin D2 50,000 unit capsule   psyllium (METAMUCIL) 58 6 % powder   Yes No   Sig: Take 1 packet by mouth 3 (three) times a day sertraline (ZOLOFT) 50 mg tablet   No No   Sig: Take 1 tablet (50 mg total) by mouth daily      Facility-Administered Medications: None       Allergies: Allergies   Allergen Reactions    Mesalamine GI Intolerance     Annotation - [id number]: pancreatitis     History:     Marital Status: Single   History   Alcohol Use    Yes     Comment: social     History   Smoking Status    Never Smoker   Smokeless Tobacco    Never Used     History   Drug Use No       Family History: Mother: healthy   Father: healthy     Physical Exam:     Vitals:   Blood Pressure: 128/67 (01/05/19 1624)  Pulse: (!) 125 (01/05/19 1624)  Temperature: 100 2 °F (37 9 °C) (01/05/19 1624)  Temp Source: Tympanic (01/05/19 1624)  Respirations: 18 (01/05/19 1624)  Weight - Scale: 81 6 kg (180 lb) (01/05/19 1624)  SpO2: 96 % (01/05/19 1624)    Physical Exam:   General: in no acute distress  HEENT: atraumatic, normocephalic  Skin: no jaundice  CVS: RRR, no murmurs appreciated  Lungs: CTAL, no wheezing or rales appreciated  Abdomen: soft, nondistended, bowel sounds normal, mild left upper and lower quadrant abdominal pain upon palpation, no guarding or rebound tenderness  Extremities: no edema, no calf swelling or tenderness  Neuro: alert and oriented x3  Psych: calm, cooperative      Lab Results: I have personally reviewed pertinent reports          Results from last 7 days  Lab Units 01/05/19  1633   WBC Thousand/uL 28 90*   HEMOGLOBIN g/dL 15 1   HEMATOCRIT % 51 2*   PLATELETS Thousands/uL 497*   NEUTROS PCT % 88*   LYMPHS PCT % 4*   MONOS PCT % 6   EOS PCT % 1       Results from last 7 days  Lab Units 01/05/19  1633   POTASSIUM mmol/L 3 8   CHLORIDE mmol/L 95*   CO2 mmol/L 29   BUN mg/dL 13   CREATININE mg/dL 1 61*   CALCIUM mg/dL 10 0   ALK PHOS U/L 110   ALT U/L 60   AST U/L 36       Results from last 7 days  Lab Units 01/05/19  1633   INR  0 95       Imaging:     Ct Abdomen Pelvis With Contrast    Result Date: 1/5/2019  Narrative: CT ABDOMEN AND PELVIS WITH IV CONTRAST INDICATION:   LUQ pain,  COMPARISON:  2/12/2017 TECHNIQUE:  CT examination of the abdomen and pelvis was performed  Axial, sagittal, and coronal 2D reformatted images were created from the source data and submitted for interpretation  Radiation dose length product (DLP) for this visit:  493 5 mGy-cm   This examination, like all CT scans performed in the [facility], was performed utilizing techniques to minimize radiation dose exposure, including the use of iterative reconstruction and automated exposure control  IV Contrast:  80 mL of iohexol (OMNIPAQUE) Enteric Contrast:  Enteric contrast was not administered  FINDINGS: ABDOMEN LOWER CHEST:  No clinically significant abnormality identified in the visualized lower chest  LIVER/BILIARY TREE:  Unremarkable  GALLBLADDER:  No calcified gallstones  No pericholecystic inflammatory change  SPLEEN:  Unremarkable  PANCREAS:  Unremarkable  ADRENAL GLANDS:  Unremarkable  KIDNEYS/URETERS:  Unremarkable  No hydronephrosis  STOMACH AND BOWEL:  Status post total colectomy with J-pouch  Mild wall thickening of the J-pouch and distal small bowel appears similar  Fluid and air-filled loops of small bowel without definite obstruction  APPENDIX:  No findings to suggest appendicitis  ABDOMINOPELVIC CAVITY:  No ascites or free intraperitoneal air  No lymphadenopathy  VESSELS:  Unremarkable for patient's age  PELVIS REPRODUCTIVE ORGANS:  Unremarkable for patient's age  URINARY BLADDER:  Unremarkable  ABDOMINAL WALL/INGUINAL REGIONS:  Unremarkable  OSSEOUS STRUCTURES:  No acute fracture or destructive osseous lesion  Impression: 1  Stable postsurgical changes, status post total colectomy  Mild wall thickening of the J-pouch and distal small bowel appears similar  Fluid and air-filled loops of small bowel without definite obstruction  Otherwise no acute intra-abdominal pathology   Workstation performed: [id number] Assessment/Plan    * Abdominal pain with hx of Ulcerative Colitis   Assessment & Plan    CT Abd Pelvis notes the following:    Stable postsurgical changes, status post total colectomy  Mild wall thickening of the J-pouch and distal small bowel appears similar  Fluid and air-filled loops of small bowel without definite obstruction  Otherwise no acute intra-abdominal   Pathology  He was given a dose of Zosyn and Solumedrol in the ED  For now, will order Levaquin and Flagyl, add on ESR and CRP level, keep NPO at midnight and consult GI service  Will also add on C diff and stool studies      SARAHI (acute kidney injury) ([facility] [address] )   Assessment & Plan    Likely prerenal due to dehydration from vomiting and diarrhea  Will hydrate with IV NS, check CK level, U/A and repeat BMP tomorrow      GI bleed   Assessment & Plan    H/H is currently stable  Will start on IV PPI, repeat H/H within 6 hours, make NPO at midnight and consult GI      Hyponatremia   Assessment & Plan    Very mild  May be due to dehydration/volume loss  Repeat BMP tomorrow      Thrombocytosis ([facility] [address] )   Assessment & Plan    Likely reactive in nature  Will repeat CBC tomorrow      Serum total bilirubin elevated   Assessment & Plan    May be a transient rise  Repeat CMP tomorrow      Anxiety   Assessment & Plan    Continue Zoloft      Leukocytosis   Assessment & Plan    Likely due to possible UC flare  Refer to above          Hospital Problem List:     Principal Problem:    Abdominal pain with hx of Ulcerative Colitis  Active Problems:    SARAHI (acute kidney injury) ([facility] [address] )    GI bleed    Leukocytosis    Anxiety    Serum total bilirubin elevated    Thrombocytosis (HCC)    Hyponatremia        VTE Prophylaxis: SCDs  Code Status: No Order    Anticipated Length of Stay:  Patient will be admitted on an Inpatient basis with an anticipated length of stay of atleast 2 midnights  Total Time for Visit, including Counseling / Coordination of Care: 30 minutes  Greater than 50% of this total time spent on direct patient counseling and coordination of care

## 2019-01-06 NOTE — UTILIZATION REVIEW
Initial Clinical Review  Admission: Date/Time/Statement: 1/5/19 @ 1851   Orders Placed This Encounter   Procedures    Inpatient Admission (expected length of stay for this patient is greater than two midnights)     Standing Status:   Standing     Number of Occurrences:   1     Order Specific Question:   Admitting Physician     Answer:   Ricardo Shah     Order Specific Question:   Level of Care     Answer:   Med Surg [16]     Order Specific Question:   Estimated length of stay     Answer:   More than 2 Midnights     Order Specific Question:   Certification     Answer:   I certify that inpatient services are medically necessary for this patient for a duration of greater than two midnights  See H&P and MD Progress Notes for additional information about the patient's course of treatment  ED: Date/Time/Mode of Arrival:   ED Arrival Information     Expected Arrival Acuity Means of Arrival Escorted By Service Admission Type    - 1/5/2019 16:18 Emergent Walk-In Self General Medicine Emergency    Arrival Complaint    VOMITING BLOOD      Chief Complaint:   Chief Complaint   Patient presents with    Abdominal Pain     patient states he began with abdominal pain about four hours ago with vomiting bright red blood as well as bright red blood in his stool  History of Illness:   20 y o  male with a PMH of Ulcerative Colitis and Anxiety who presents with abdominal pain, hematemesis and bloody stools  He states that he was in his usual state of health until today when he developed LUQ abdominal pain  This was then associated with nausea  He proceeded to vomit several times which then turned slightly bloody  He also noted bloody stools today as well as diarrhea  In regards to his UC he states that he has been doing relatively well and has not had any flareups for over 2 years  Of note, he also reports a hx of repeated C diff infections  Currently he reports mild LUQ abdominal pain and nausea    Gastrointestinal: Positive for abdominal pain, blood in stool, nausea and vomiting  Abdomen: left upper and lower quadrant abdominal pain upon palpation  ED Vital Signs:   ED Triage Vitals [01/05/19 1624]   Temperature Pulse Respirations Blood Pressure SpO2   100 2 °F (37 9 °C) (!) 125 18 128/67 96 %      Temp Source Heart Rate Source Patient Position - Orthostatic VS BP Location FiO2 (%)   Tympanic Monitor Lying Right arm --      Pain Score       9        Wt Readings from Last 1 Encounters:   01/05/19 86 4 kg (190 lb 7 6 oz)   Vital Signs (abnormal): Abnormal Labs/Diagnostic Test Results:   WBC 28 90   CL 95 CR 1 61 TPROT 9 1 TBILI 1 10 CRP 3 3   U/A+BLOOD  CT A/P=1  Stable postsurgical changes, status post total colectomy  Mild wall thickening of the J-pouch and distal small bowel appears similar  Fluid and air-filled loops of small bowel without definite obstruction    Otherwise no acute intra-abdominal pathology  ED Treatment:   Medication Administration from 01/05/2019 1618 to 01/05/2019 2111       Date/Time Order Dose Route Action Action by Comments     01/05/2019 1647 HYDROmorphone (DILAUDID) injection 0 5 mg 0 5 mg Intravenous Given Lukas Anaya RN      01/05/2019 1647 ondansetron (ZOFRAN) injection 4 mg 4 mg Intravenous Given Lukas Anaya RN      01/05/2019 1922 sodium chloride 0 9 % bolus 1,000 mL 0 mL Intravenous Stopped Lukas Anaya RN      01/05/2019 1647 sodium chloride 0 9 % bolus 1,000 mL 1,000 mL Intravenous [address] Lukas Anaya RN      01/05/2019 1708 iohexol (OMNIPAQUE) 350 MG/ML injection (MULTI-DOSE) 80 mL 80 mL Intravenous Given Estee Domingo      01/05/2019 1817 HYDROmorphone (DILAUDID) injection 0 5 mg 0 5 mg Intravenous Given Elaine Beebe RN      01/05/2019 1845 aluminum-magnesium hydroxide-simethicone (MYLANTA) 200-200-20 mg/5 mL oral suspension 30 mL 30 mL Oral Given Lukas Anaya RN      01/05/2019 1846 lidocaine viscous (XYLOCAINE) 2 % mucosal solution 15 mL 15 mL Swish & Swallow Given Stevphen Sushant, RN      01/05/2019 1925 methylPREDNISolone sodium succinate (Solu-MEDROL) injection 125 mg 125 mg Intravenous Given Xochilt Altman RN      01/05/2019 1954 piperacillin-tazobactam (ZOSYN) IVPB 3 375 g 0 g Intravenous Stopped Xochilt Altman RN      01/05/2019 1924 piperacillin-tazobactam (ZOSYN) IVPB 3 375 g 3 375 g Intravenous [address] Xochilt Altman RN      01/05/2019 2003 pantoprazole (PROTONIX) injection 40 mg 40 mg Intravenous Given Xochilt Altman RN      01/05/2019 2028 HYDROmorphone (DILAUDID) injection 0 5 mg 0 5 mg Intravenous Given Xochilt Altman RN       Past Medical/Surgical History: Active Ambulatory Problems     Diagnosis Date Noted    Acne 06/21/2016    Chronic ulcerative pancolitis ([facility] ) 09/13/2018    Clostridium difficile colitis 09/13/2018    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[place] [address] ) 09/13/2018    Stricture of rectum 09/13/2018    CAR (generalized anxiety disorder) 10/19/2018    BMI 28 0-28 9,adult 10/19/2018     Resolved Ambulatory Problems     Diagnosis Date Noted    Ulcerative pancolitis ([place] [address] ) 11/18/2016    Pancreatitis 09/13/2018    Dehydration 09/14/2018     Past Medical History:   Diagnosis Date    Colitis     Pancreatitis    Admitting Diagnosis: Leukocytosis [D72 829]  Abdominal pain [R10 9]  Age/Sex: 20 y o  male  Assessment/Plan:   Abdominal pain with hx of Ulcerative Colitis   Assessment & Plan     CT Abd Pelvis  He was given a dose of Zosyn and Solumedrol in the ED  For now, will order Levaquin and Flagyl, add on ESR and CRP level, keep NPO and consult GI service  Will also add on C diff and stool studies    SARAHI (acute kidney injury) ([facility] )   Assessment & Plan     Likely prerenal due to dehydration from vomiting and diarrhea  Will hydrate with IV NS, check CK level, U/A and repeat BMP tomorrow    GI bleed   Assessment & Plan     H/H is currently stable    Will start on IV PPI, repeat H/H within 6 hours, make NPO midnight and consult GI    Admission Orders:  MED SURG  CONSULT GI  VENODYNES  Scheduled Meds:   Current Facility-Administered Medications:  levofloxacin 750 mg Intravenous Q24H   metroNIDAZOLE 500 mg Intravenous Q8H   morphine injection 2 mg Intravenous Q4H PRN   ondansetron 4 mg Intravenous Q6H PRN   pantoprazole 40 mg Intravenous Q12H KAITLIN   sertraline 50 mg Oral Daily   Continuous Infusions:   sodium chloride 75 mL/hr   PRN Meds: morphine injection    ondansetron  St  [address] [facility] Review Department  Phone: [phone]; Fax [phone]  [email or web address]  ATTENTION: Please call with any questions or concerns to [phone]  and carefully listen to the prompts so that you are directed to the right person  Send all requests for admission clinical reviews, approved or denied determinations and any other requests to fax [phone]   All voicemails are confidential

## 2019-01-06 NOTE — ASSESSMENT & PLAN NOTE
· Patient presented with bloody diarrhea and hematochezia  Hx of UC and C diff  WBC 28  No fevers  · CT A/P shows stable postsurgical changes, status post total colectomy  Mild wall thickening of the J-pouch and distal small bowel appears similar  Fluid and air-filled loops of small bowel without definite obstruction  No other pathology     · Placed on  Levaquin and Flagyl  · ESR 2, CRP 3 3  · Pain has already improved, advanced diet today  · Follow up on C diff, if negative can DC home with follow up with his GI doctor in [facility]

## 2019-01-06 NOTE — PROGRESS NOTES
Progress Note - Dearl Melania 1995, 20 y o  male MRN: [id number]    Unit/Bed#: Jeannine Lea Encounter: [id number]    Primary Care Provider: Garry Duane, DO   Date and time admitted to hospital: 1/5/2019  4:21 PM        * Abdominal pain with hx of Ulcerative Colitis   Assessment & Plan    · Patient presented with bloody diarrhea and hematochezia  Hx of UC and C diff  WBC 28  No fevers  · CT A/P shows stable postsurgical changes, status post total colectomy  Mild wall thickening of the J-pouch and distal small bowel appears similar  Fluid and air-filled loops of small bowel without definite obstruction  No other pathology  · Placed on  Levaquin and Flagyl  · ESR 2, CRP 3 3  · Pain has already improved, advanced diet today  · Follow up on C diff, if negative can DC home with follow up with his GI doctor in [address] [place] [address]     Hyponatremia   Assessment & Plan    mild     Thrombocytosis ([address] )   Assessment & Plan    Likely reactive in nature  improving     Anxiety   Assessment & Plan    Continue Zoloft      GI bleed   Assessment & Plan    Reported bloody diarrhea and hematochezia  H/H stable  Cont PPI     Leukocytosis   Assessment & Plan    Likely due to possible UC flare vs C diff  Improving already  monitor     Chronic ulcerative pancolitis [facility])   Assessment & Plan          Serum total bilirubin elevated-resolved as of 1/6/2019   Assessment & Plan    May be a transient rise  resolved     SARAHI (acute kidney injury) (HCC)-resolved as of 1/6/2019   Assessment & Plan    Likely prerenal due to dehydration from vomiting and diarrhea  Resolved with IVF hydration           VTE Pharmacologic Prophylaxis:   Pharmacologic: none 2/2 reported GI bleed  Mechanical VTE Prophylaxis in Place: Yes    Patient Centered Rounds: I have performed bedside rounds with nursing staff today    Discussions with Specialists or Other Care Team Provider: Yes  Education and Discussions with Family / Patient:Yes  Time Spent for Care: 30 minutes  More than 50% of total time spent on counseling and coordination of care as described above  Current Length of Stay: 1 day(s)  Current Patient Status: Inpatient     Discharge Plan: pending    Code Status: Level 1 - Full Code      Subjective:   Feels much better today, no diarrhea yet  abd pain has resolved  Wants to eat  Objective:     Vitals:   Temp (24hrs), Av 8 °F (36 6 °C), Min:97 4 °F (36 3 °C), Max:98 3 °F (36 8 °C)    Temp:  [97 4 °F (36 3 °C)-98 3 °F (36 8 °C)] 97 4 °F (36 3 °C)  HR:  [] 88  Resp:  [18] 18  BP: (118-139)/(60-69) 139/69  SpO2:  [94 %-97 %] 97 %  Body mass index is 28 13 kg/m²  Input and Output Summary (last 24 hours): Intake/Output Summary (Last 24 hours) at 19 1711  Last data filed at 19 1501   Gross per 24 hour   Intake             2710 ml   Output              500 ml   Net             2210 ml        Physical Exam:     Physical Exam   Constitutional: He is oriented to person, place, and time  He appears well-developed  No distress  HENT:   Head: Normocephalic and atraumatic  Cardiovascular: Normal rate, regular rhythm and normal heart sounds  No murmur heard  Pulmonary/Chest: Effort normal and breath sounds normal  No respiratory distress  He has no wheezes  He has no rales  Abdominal: Soft  Bowel sounds are normal  He exhibits no distension  There is no tenderness  There is no rebound and no guarding  Musculoskeletal: He exhibits no edema, tenderness or deformity  Neurological: He is alert and oriented to person, place, and time  Skin: Skin is warm and dry  Psychiatric: He has a normal mood and affect  His behavior is normal    Nursing note and vitals reviewed        Additional Data:     Labs:      Results from last 7 days  Lab Units 19  0511 19  2041 19  1633   WBC Thousand/uL 14 78*  --  28 90*   HEMOGLOBIN g/dL 13 1 13 9 15 1   HEMATOCRIT % 45 7 47 0 51 2*   PLATELETS Thousands/uL 451*  --  497* NEUTROS PCT % 93*  --  88*       Results from last 7 days  Lab Units 01/06/19  0511 01/05/19  1633   SODIUM mmol/L 131* 134*   POTASSIUM mmol/L 4 7 3 8   CHLORIDE mmol/L 96* 95*   CO2 mmol/L 27 29   BUN mg/dL 16 13   CREATININE mg/dL 1 30 1 61*   CALCIUM mg/dL 8 8 10 0   TOTAL BILIRUBIN mg/dL 0 90 1 10*   ALK PHOS U/L 91 110   ALT U/L 42 60   AST U/L 22 36       Results from last 7 days  Lab Units 01/05/19  1633   INR  0 95         No results found for: HGBA1C        Results from last 7 days  Lab Units 01/05/19  1748   LACTIC ACID mmol/L 1 7   PROCALCITONIN ng/ml 0 17       * I Have Reviewed All Lab Data Listed Above  * Additional Pertinent Lab Tests Reviewed: All Labs Within Last 24 Hours Reviewed    Imaging:     XR chest pa & lateral   Final Result by Rena Hackett MD (01/06 0930)      No acute cardiopulmonary disease  Workstation performed: [id number]         CT abdomen pelvis with contrast   Final Result by Malika Escobedo MD (01/05 1725)   1  Stable postsurgical changes, status post total colectomy  Mild wall thickening of the J-pouch and distal small bowel appears similar  Fluid and air-filled loops of small bowel without definite obstruction  Otherwise no acute intra-abdominal    pathology                    Workstation performed: [id number]           Imaging Reports Reviewed by myself    Cultures:   Blood Culture: No results found for: BLOODCX  Urine Culture: No results found for: URINECX  Sputum Culture: No components found for: SPUTUMCX  Wound Culture: No results found for: WOUNDCULT    Last 24 Hours Medication List:     Current Facility-Administered Medications:  levofloxacin 750 mg Intravenous Q24H Steph Powell MD Last Rate: Stopped (01/06/19 0003)   metroNIDAZOLE 500 mg Intravenous Q8H Steph Powell MD Last Rate: 500 mg (01/06/19 1636)   morphine injection 2 mg Intravenous Q4H PRN Steph Powell MD    ondansetron 4 mg Intravenous Q6H PRN Steph Powell MD    pantoprazole 40 mg Intravenous Q12H Damarisstlino Carrero MD    sertraline 50 mg Oral Daily Deepak Hunter MD    sodium chloride 75 mL/hr Intravenous Continuous Deepak Hunter MD Last Rate: 75 mL/hr (01/05/19 2203)        Today, Patient Was Seen By: Hubert Barry MD    ** Please Note: Dragon 360 Dictation voice to text software may have been used in the creation of this document   **

## 2019-01-07 ENCOUNTER — TRANSITIONAL CARE MANAGEMENT (OUTPATIENT)
Dept: FAMILY MEDICINE CLINIC | Facility: CLINIC | Age: 24
End: 2019-01-07

## 2019-01-07 VITALS
TEMPERATURE: 98.6 F | RESPIRATION RATE: 18 BRPM | HEIGHT: 69 IN | DIASTOLIC BLOOD PRESSURE: 71 MMHG | SYSTOLIC BLOOD PRESSURE: 134 MMHG | OXYGEN SATURATION: 98 % | WEIGHT: 190.7 LBS | HEART RATE: 99 BPM | BODY MASS INDEX: 28.24 KG/M2

## 2019-01-07 LAB
ANION GAP SERPL CALCULATED.3IONS-SCNC: 6 MMOL/L (ref 4–13)
BASOPHILS # BLD AUTO: 0.02 THOUSANDS/ΜL (ref 0–0.1)
BASOPHILS NFR BLD AUTO: 0 % (ref 0–1)
BUN SERPL-MCNC: 18 MG/DL (ref 5–25)
CALCIUM SERPL-MCNC: 8.4 MG/DL (ref 8.3–10.1)
CHLORIDE SERPL-SCNC: 102 MMOL/L (ref 100–108)
CO2 SERPL-SCNC: 29 MMOL/L (ref 21–32)
CREAT SERPL-MCNC: 1.15 MG/DL (ref 0.6–1.3)
EOSINOPHIL # BLD AUTO: 0.02 THOUSAND/ΜL (ref 0–0.61)
EOSINOPHIL NFR BLD AUTO: 0 % (ref 0–6)
ERYTHROCYTE [DISTWIDTH] IN BLOOD BY AUTOMATED COUNT: 17.3 % (ref 11.6–15.1)
GFR SERPL CREATININE-BSD FRML MDRD: 88 ML/MIN/1.73SQ M
GLUCOSE SERPL-MCNC: 113 MG/DL (ref 65–140)
HCT VFR BLD AUTO: 39.7 % (ref 36.5–49.3)
HGB BLD-MCNC: 11.4 G/DL (ref 12–17)
IMM GRANULOCYTES # BLD AUTO: 0.09 THOUSAND/UL (ref 0–0.2)
IMM GRANULOCYTES NFR BLD AUTO: 1 % (ref 0–2)
LYMPHOCYTES # BLD AUTO: 1.86 THOUSANDS/ΜL (ref 0.6–4.47)
LYMPHOCYTES NFR BLD AUTO: 12 % (ref 14–44)
MCH RBC QN AUTO: 18.9 PG (ref 26.8–34.3)
MCHC RBC AUTO-ENTMCNC: 28.7 G/DL (ref 31.4–37.4)
MCV RBC AUTO: 66 FL (ref 82–98)
MONOCYTES # BLD AUTO: 1.71 THOUSAND/ΜL (ref 0.17–1.22)
MONOCYTES NFR BLD AUTO: 11 % (ref 4–12)
MRSA NOSE QL CULT: NORMAL
NEUTROPHILS # BLD AUTO: 12.07 THOUSANDS/ΜL (ref 1.85–7.62)
NEUTS SEG NFR BLD AUTO: 76 % (ref 43–75)
NRBC BLD AUTO-RTO: 0 /100 WBCS
PLATELET # BLD AUTO: 392 THOUSANDS/UL (ref 149–390)
PMV BLD AUTO: 9.2 FL (ref 8.9–12.7)
POTASSIUM SERPL-SCNC: 4 MMOL/L (ref 3.5–5.3)
RBC # BLD AUTO: 6.02 MILLION/UL (ref 3.88–5.62)
SODIUM SERPL-SCNC: 137 MMOL/L (ref 136–145)
WBC # BLD AUTO: 15.77 THOUSAND/UL (ref 4.31–10.16)

## 2019-01-07 PROCEDURE — C9113 INJ PANTOPRAZOLE SODIUM, VIA: HCPCS | Performed by: STUDENT IN AN ORGANIZED HEALTH CARE EDUCATION/TRAINING PROGRAM

## 2019-01-07 PROCEDURE — 99239 HOSP IP/OBS DSCHRG MGMT >30: CPT | Performed by: STUDENT IN AN ORGANIZED HEALTH CARE EDUCATION/TRAINING PROGRAM

## 2019-01-07 PROCEDURE — 85025 COMPLETE CBC W/AUTO DIFF WBC: CPT | Performed by: STUDENT IN AN ORGANIZED HEALTH CARE EDUCATION/TRAINING PROGRAM

## 2019-01-07 PROCEDURE — 80048 BASIC METABOLIC PNL TOTAL CA: CPT | Performed by: STUDENT IN AN ORGANIZED HEALTH CARE EDUCATION/TRAINING PROGRAM

## 2019-01-07 RX ADMIN — PANTOPRAZOLE SODIUM 40 MG: 40 INJECTION, POWDER, FOR SOLUTION INTRAVENOUS at 09:06

## 2019-01-07 RX ADMIN — SERTRALINE HYDROCHLORIDE 50 MG: 50 TABLET ORAL at 09:06

## 2019-01-07 RX ADMIN — SODIUM CHLORIDE 75 ML/HR: 0.9 INJECTION, SOLUTION INTRAVENOUS at 10:58

## 2019-01-07 RX ADMIN — METRONIDAZOLE 500 MG: 500 INJECTION, SOLUTION INTRAVENOUS at 06:40

## 2019-01-07 NOTE — DISCHARGE INSTRUCTIONS
· Your C diff toxin is still pending on discharge, if you require antibiotics you will be called and notified   If you develop fever, abdominal pain, worsening in diarrhea follow up with your surgeons in [place] immediately

## 2019-01-07 NOTE — UTILIZATION REVIEW
Continued Stay Review  Date: 19  Vitals:Temp (24hrs), Av 8 °F (36 6 °C), Min:97 4 °F (36 3 °C), Max:98 3 °F (36 8 °C)  Temp:  [97 4 °F (36 3 °C)-98 3 °F (36 8 °C)] 97 4 °F (36 3 °C)  HR:  [] 88  Resp:  [18] 18  BP: (118-139)/(60-69) 139/69  SpO2:  [94 %-97 %] 97 %  Body mass index is 28 13 kg/m²  Medications:   Scheduled Meds:   Current Facility-Administered Medications:  levofloxacin 750 mg Intravenous Q24H   metroNIDAZOLE 500 mg Intravenous Q8H   morphine injection 2 mg Intravenous Q4H PRN   ondansetron 4 mg Intravenous Q6H PRN   pantoprazole 40 mg Intravenous Q12H KAITLIN   sertraline 50 mg Oral Daily   sodium chloride 75 mL/hr Intravenous Continuous   Continuous Infusions:   sodium chloride 75 mL/hr Last Rate: 75 mL/hr (19 1058)   PRN Meds: morphine injection; USED X2/24HRS    ondansetron  Abnormal Labs/Diagnostic Results:   WBC 14 78   CL 96  Age/Sex: 20 y o  male   Assessment/Plan:   USING IV MORPHINE FOR ABD PAIN CONTROL  REMAINS ON DOUBLE IVABT THERAPY AT THIS TIME  TRIAL ADVANCING DIET, IVF MAINTAINED  Discharge Plan: TBD  [address]  Utilization Review Department  Phone: [phone]; Fax [phone]  [email or web address]  ATTENTION: Please call with any questions or concerns to [phone]  and carefully listen to the prompts so that you are directed to the right person  Send all requests for admission clinical reviews, approved or denied determinations and any other requests to fax [phone]   All voicemails are confidential

## 2019-01-07 NOTE — NURSING NOTE
Patient discharged home  Parents were present  Discharge paperwork reviewed with patient  IV removed

## 2019-01-08 LAB
C DIFF TOX GENS STL QL NAA+PROBE: NORMAL
CAMPYLOBACTER DNA SPEC NAA+PROBE: NORMAL
SALMONELLA DNA SPEC QL NAA+PROBE: NORMAL
SHIGA TOXIN STX GENE SPEC NAA+PROBE: NORMAL
SHIGELLA DNA SPEC QL NAA+PROBE: NORMAL

## 2019-01-08 NOTE — ASSESSMENT & PLAN NOTE
· Patient presented with bloody diarrhea and hematochezia  Hx of UC and C diff  WBC 28  No fevers  · CT A/P shows stable postsurgical changes, status post total colectomy  Mild wall thickening of the J-pouch and distal small bowel appears similar  Fluid and air-filled loops of small bowel without definite obstruction  No other pathology  · He was initially placed on  Levaquin and Flagyl  · ESR 2, CRP 3 3  · Brain improved quite rapidly over 24 hours and his diet was advanced without issue  He continued to have diarrhea but it was nonbloody and he stated it was his baseline diarrhea which occurs when he does not take his Metamucil  · C diff was ordered and was pending on discharge  Discussion was had with patient and GI and given his rapid improvement he was okay to discharge home pending his C diff  He was advised not to take his Metamucil until C diff was resulted and he was called to give clearance to start it again    · He was also advised to follow up with his GI and surgery physicians in [place]

## 2019-01-08 NOTE — UTILIZATION REVIEW
[facility]  [id number]    Notification of Discharge  This is a Notification of Discharge from our facility [address]  Please be advised that this patient has been discharge from our facility  Below you will find the admission and discharge date and time including the patients disposition  PRESENTATION DATE: 1/5/2019  4:21 PM  IP ADMISSION DATE: 1/5/19 1851  DISCHARGE DATE: 1/7/2019  2:33 PM  DISPOSITION: [id number] Utilization Review Department  Phone: [phone]; Fax [phone]  ATTENTION: Please call with any questions or concerns to [phone]  and carefully listen to the prompts so that you are directed to the right person  Send all requests for admission clinical reviews, approved or denied determinations and any other requests to fax [phone]   All voicemails are confidential

## 2019-01-08 NOTE — DISCHARGE SUMMARY
Discharge- Gabriella Boss 1995, 20 y o  male MRN: [id number]    Unit/Bed#: Jeannine Lea Encounter: [id number]    Primary Care Provider: Librado Phillips DO   Date and time admitted to hospital: 1/5/2019  4:21 PM        * Abdominal pain with hx of Ulcerative Colitis   Assessment & Plan    · Patient presented with bloody diarrhea and hematochezia  Hx of UC and C diff  WBC 28  No fevers  · CT A/P shows stable postsurgical changes, status post total colectomy  Mild wall thickening of the J-pouch and distal small bowel appears similar  Fluid and air-filled loops of small bowel without definite obstruction  No other pathology  · He was initially placed on  Levaquin and Flagyl  · ESR 2, CRP 3 3  · Brain improved quite rapidly over 24 hours and his diet was advanced without issue  He continued to have diarrhea but it was nonbloody and he stated it was his baseline diarrhea which occurs when he does not take his Metamucil  · C diff was ordered and was pending on discharge  Discussion was had with patient and GI and given his rapid improvement he was okay to discharge home pending his C diff  He was advised not to take his Metamucil until C diff was resulted and he was called to give clearance to start it again    · He was also advised to follow up with his GI and surgery physicians in [place]     Hyponatremia   [address] [place] [address]    mild     Chronic ulcerative pancolitis [facility])   Assessment & Plan                Discharging Physician / Practitioner: Annemarie Hawk MD  PCP: Librado Phillips DO  Admission Date: 1/5/2019  Discharge Date: 01/07/19    Reason for Admission: Abdominal Pain (patient states he began with abdominal pain about four hours ago with vomiting bright red blood as well as bright red blood in his stool )        Resolved Problems  Date Reviewed: 9/21/2018          Resolved    SARAHI (acute kidney injury) ([facility] [address] ) 1/6/2019     Resolved by  Annemarie Hawk MD    Serum total bilirubin elevated 1/6/2019     Resolved by  Jordon Almaraz MD          Consultations During Hospital Stay:  IP CONSULT TO GASTROENTEROLOGY    Procedures Performed:     · none    Significant Findings / Test Results:     ·     Results from last 7 days  Lab Units 01/07/19  0617 01/06/19  0511 01/05/19  2041 01/05/19  1633   WBC Thousand/uL 15 77* 14 78*  --  28 90*   HEMOGLOBIN g/dL 11 4* 13 1 13 9 15 1   PLATELETS Thousands/uL 392* 451*  --  497*       Results from last 7 days  Lab Units 01/07/19  0617 01/06/19  0511 01/05/19  1633   SODIUM mmol/L 137 131* 134*   POTASSIUM mmol/L 4 0 4 7 3 8   CHLORIDE mmol/L 102 96* 95*   CO2 mmol/L 29 27 29   BUN mg/dL 18 16 13   CREATININE mg/dL 1 15 1 30 1 61*   CALCIUM mg/dL 8 4 8 8 10 0   TOTAL BILIRUBIN mg/dL  --  0 90 1 10*   ALK PHOS U/L  --  91 110   ALT U/L  --  42 60   AST U/L  --  22 36       Results from last 7 days  Lab Units 01/05/19  1633   INR  0 95         No results found for: HGBA1C        Results from last 7 days  Lab Units 01/05/19  1748   LACTIC ACID mmol/L 1 7   PROCALCITONIN ng/ml 0 17     Blood Culture:   Lab Results   Component Value Date    BLOODCX No Growth at 24 hrs  01/05/2019    BLOODCX No Growth at 24 hrs  01/05/2019     Urine Culture: No results found for: URINECX  Sputum Culture: No components found for: SPUTUMCX  Wound Culture: No results found for: WOUNDCULT     XR chest pa & lateral   Final Result by Gay Denver, MD (01/06 0930)      No acute cardiopulmonary disease  Workstation performed: [id number]         CT abdomen pelvis with contrast   Final Result by Hortencia Hein MD (01/05 1725)   1  Stable postsurgical changes, status post total colectomy  Mild wall thickening of the J-pouch and distal small bowel appears similar  Fluid and air-filled loops of small bowel without definite obstruction  Otherwise no acute intra-abdominal    pathology                    Workstation performed: [id number]                Incidental Findings:   ·      Test Results Pending at Discharge (will require follow up):   · C diff     Outpatient Tests Requested:  ·     Complications:  none    Reason for Admission:   Chief Complaint   Patient presents with    Abdominal Pain     patient states he began with abdominal pain about four hours ago with vomiting bright red blood as well as bright red blood in his stool  Hospital Course:     Keshia Gallardo is a 20 y o  male patient with a PMH of Ulcerative Colitis and Anxiety who presents with abdominal pain, hematemesis and bloody stools  He states that he was in his usual state of health until today when he developed LUQ abdominal pain  This was then associated with nausea  He proceeded to vomit several times which then turned slightly bloody  He also noted bloody stools today as well as diarrhea  In regards to his UC he states that he has been doing relatively well and has not had any flareups for over 2 years  Of note, he also reports a hx of repeated C diff infections  Currently he reports mild LUQ abdominal pain and nausea  No other complaints or concerns         Please see above list of diagnoses and related plan for additional information  Condition at Discharge: good       Discharge Day Visit / Exam:     Subjective:  Feels great  Abdominal pain is resolved  8 without issue  Wants to go home  States he would like to follow up with C diff result at home and will start taking antibiotics if required at home  He does not want to wait in the hospital any longer  Vitals: Blood Pressure: 134/71 (01/07/19 1345)  Pulse: 99 (01/07/19 1345)  Temperature: 98 6 °F (37 °C) (01/07/19 1345)  Temp Source: Oral (01/07/19 1345)  Respirations: 18 (01/07/19 1345)  Height: 5' 9" (175 3 cm) (01/05/19 2116)  Weight - Scale: 86 5 kg (190 lb 11 2 oz) (01/07/19 0316)  SpO2: 98 % (01/07/19 1345)  Exam:   Physical Exam   Constitutional: He is oriented to person, place, and time  He appears well-developed  No distress  HENT:   Head: Normocephalic and atraumatic  Cardiovascular: Normal rate, regular rhythm and normal heart sounds  No murmur heard  Pulmonary/Chest: Effort normal and breath sounds normal  No respiratory distress  He has no wheezes  He has no rales  Abdominal: Soft  Bowel sounds are normal  He exhibits no distension  There is no tenderness  There is no rebound and no guarding  Musculoskeletal: He exhibits no edema, tenderness or deformity  Neurological: He is alert and oriented to person, place, and time  Skin: Skin is warm and dry  Psychiatric: He has a normal mood and affect  His behavior is normal    Nursing note and vitals reviewed  Discharge instructions/Information to patient and family:   See after visit summary for information provided to patient and family  Provisions for Follow-Up Care:  See after visit summary for information related to follow-up care and any pertinent home health orders  Disposition:     Home    Planned Readmission:  No     Discharge Statement:  I spent greater than 30 minutes discharging the patient  This time was spent on the day of discharge  I had direct contact with the patient on the day of discharge  Greater than 50% of the total time was spent examining patient, answering all patient questions, arranging and discussing plan of care with patient as well as directly providing post-discharge instructions  Additional time then spent on discharge activities  Discharge Medications:  See after visit summary for reconciled discharge medications provided to patient and family        ** Please Note: This note has been constructed using a voice recognition system **

## 2019-01-09 LAB
O+P STL CONC: NORMAL
WBC SPEC QL GRAM STN: ABNORMAL

## 2019-01-10 LAB
BACTERIA BLD CULT: NORMAL
BACTERIA BLD CULT: NORMAL

## 2019-01-15 ENCOUNTER — TELEPHONE (OUTPATIENT)
Dept: FAMILY MEDICINE CLINIC | Facility: CLINIC | Age: 24
End: 2019-01-15

## 2019-01-17 NOTE — UTILIZATION REVIEW
Auth:   [id number]    Notification of Discharge  This is a Notification of Discharge from our facility [address]  Please be advised that this patient has been discharge from our facility  Below you will find the admission and discharge date and time including the patients disposition  PRESENTATION DATE: 1/5/2019  4:21 PM  IP ADMISSION DATE: 1/5/19 1851  DISCHARGE DATE: 1/7/2019  2:33 PM  DISPOSITION: [address] Utilization Review Department  Phone: [phone]; Fax [phone]  ATTENTION: Please call with any questions or concerns to [phone]  and carefully listen to the prompts so that you are directed to the right person  Send all requests for admission clinical reviews, approved or denied determinations and any other requests to fax [phone]   All voicemails are confidential

## 2019-01-23 NOTE — UTILIZATION REVIEW
Auth:   [id number]    Notification of Discharge  This is a Notification of Discharge from our facility [address]  Please be advised that this patient has been discharge from our facility  Below you will find the admission and discharge date and time including the patients disposition  PRESENTATION DATE: 1/5/2019  4:21 PM  IP ADMISSION DATE: 1/5/19 1851  DISCHARGE DATE: 1/7/2019  2:33 PM  DISPOSITION: [address] Utilization Review Department  Phone: [phone]; Fax [phone]  ATTENTION: Please call with any questions or concerns to [phone]  and carefully listen to the prompts so that you are directed to the right person  Send all requests for admission clinical reviews, approved or denied determinations and any other requests to fax [phone]   All voicemails are confidential

## 2019-02-05 ENCOUNTER — TELEPHONE (OUTPATIENT)
Dept: FAMILY MEDICINE CLINIC | Facility: CLINIC | Age: 24
End: 2019-02-05

## 2019-02-05 NOTE — TELEPHONE ENCOUNTER
Laura Kumar from the [employer] came and dropped off a record release form to get records released on patient  Please contact Laura Kumar at [phone] x  [phone]     Form placed in medical records bin  Please contact when records are complete

## 2019-02-06 NOTE — TELEPHONE ENCOUNTER
Records place in bin for medical records to be sent to [address] [place] [address] will send records when complete

## 2019-02-07 NOTE — TELEPHONE ENCOUNTER
Forms were faxed to [facility], confirmation received  They will  requesting records   No further action at this time

## 2019-02-08 ENCOUNTER — TELEPHONE (OUTPATIENT)
Dept: FAMILY MEDICINE CLINIC | Facility: CLINIC | Age: 24
End: 2019-02-08

## 2019-02-08 DIAGNOSIS — R10.9 ABDOMINAL PAIN, UNSPECIFIED ABDOMINAL LOCATION: Primary | ICD-10-CM

## 2019-02-08 RX ORDER — ACETAMINOPHEN AND CODEINE PHOSPHATE 60; 300 MG/1; MG/1
1 TABLET ORAL EVERY 6 HOURS PRN
Qty: 40 TABLET | Refills: 0 | Status: SHIPPED | OUTPATIENT
Start: 2019-02-08 | End: 2019-02-09

## 2019-02-08 NOTE — TELEPHONE ENCOUNTER
Spoke to patient he states that he is in excruciating pain and does not want to go to [place] because they did not know what to do with his situation his appt with the surgeon is on the 25th as stated previosly  in the message   Please advise  Kaitlyn Tesfaye MA

## 2019-02-08 NOTE — TELEPHONE ENCOUNTER
PT is having bad stomach pain right now and doesn't have an apt with his surgeon until the 25th, he would go to [place] but they really can't deal with what he has  He has lost some weight the last couple of days due to bathroom use  His maternal grandmother is in Hospice care right now and only given about a week to live, he wants to go visit her today, is there anything Dr Leon Park could give him for the stomach pains for now? Please call pt's cell with any questions

## 2019-02-09 ENCOUNTER — HOSPITAL ENCOUNTER (OUTPATIENT)
Facility: HOSPITAL | Age: 24
Setting detail: OBSERVATION
Discharge: HOME | End: 2019-02-10
Attending: EMERGENCY MEDICINE | Admitting: INTERNAL MEDICINE
Payer: COMMERCIAL

## 2019-02-09 ENCOUNTER — APPOINTMENT (EMERGENCY)
Dept: RADIOLOGY | Facility: HOSPITAL | Age: 24
End: 2019-02-09
Payer: COMMERCIAL

## 2019-02-09 DIAGNOSIS — R06.6 HICCUPS: ICD-10-CM

## 2019-02-09 DIAGNOSIS — K51.018 ULCERATIVE CHRONIC PANCOLITIS WITH OTHER COMPLICATION (HCC): ICD-10-CM

## 2019-02-09 DIAGNOSIS — R10.12 LEFT UPPER QUADRANT PAIN: ICD-10-CM

## 2019-02-09 DIAGNOSIS — N17.9 AKI (ACUTE KIDNEY INJURY) (HCC): ICD-10-CM

## 2019-02-09 DIAGNOSIS — R10.9 PAIN IN THE ABDOMEN: ICD-10-CM

## 2019-02-09 DIAGNOSIS — D75.839 THROMBOCYTOSIS: ICD-10-CM

## 2019-02-09 DIAGNOSIS — K51.00 ULCERATIVE PANCOLITIS (HCC): Primary | ICD-10-CM

## 2019-02-09 LAB
ALBUMIN SERPL BCP-MCNC: 3.9 G/DL (ref 3.5–5)
ALP SERPL-CCNC: 115 U/L (ref 46–116)
ALT SERPL W P-5'-P-CCNC: 50 U/L (ref 12–78)
ANION GAP SERPL CALCULATED.3IONS-SCNC: 10 MMOL/L (ref 4–13)
ANION GAP SERPL CALCULATED.3IONS-SCNC: 5 MMOL/L (ref 4–13)
AST SERPL W P-5'-P-CCNC: 36 U/L (ref 5–45)
BASOPHILS # BLD AUTO: 0.12 THOUSANDS/ΜL (ref 0–0.1)
BASOPHILS NFR BLD AUTO: 1 % (ref 0–1)
BILIRUB SERPL-MCNC: 0.6 MG/DL (ref 0.2–1)
BILIRUB UR QL STRIP: NEGATIVE
BUN SERPL-MCNC: 11 MG/DL (ref 5–25)
BUN SERPL-MCNC: 14 MG/DL (ref 5–25)
CALCIUM SERPL-MCNC: 9.1 MG/DL (ref 8.3–10.1)
CALCIUM SERPL-MCNC: 9.4 MG/DL (ref 8.3–10.1)
CHLORIDE SERPL-SCNC: 95 MMOL/L (ref 100–108)
CHLORIDE SERPL-SCNC: 98 MMOL/L (ref 100–108)
CLARITY UR: CLEAR
CO2 SERPL-SCNC: 25 MMOL/L (ref 21–32)
CO2 SERPL-SCNC: 27 MMOL/L (ref 21–32)
COLOR UR: COLORLESS
CREAT SERPL-MCNC: 1.35 MG/DL (ref 0.6–1.3)
CREAT SERPL-MCNC: 1.69 MG/DL (ref 0.6–1.3)
EOSINOPHIL # BLD AUTO: 0.15 THOUSAND/ΜL (ref 0–0.61)
EOSINOPHIL NFR BLD AUTO: 1 % (ref 0–6)
ERYTHROCYTE [DISTWIDTH] IN BLOOD BY AUTOMATED COUNT: 18.6 % (ref 11.6–15.1)
GFR SERPL CREATININE-BSD FRML MDRD: 55 ML/MIN/1.73SQ M
GFR SERPL CREATININE-BSD FRML MDRD: 72 ML/MIN/1.73SQ M
GLUCOSE SERPL-MCNC: 86 MG/DL (ref 65–140)
GLUCOSE SERPL-MCNC: 94 MG/DL (ref 65–140)
GLUCOSE UR STRIP-MCNC: NEGATIVE MG/DL
HCT VFR BLD AUTO: 52.1 % (ref 36.5–49.3)
HGB BLD-MCNC: 15.4 G/DL (ref 12–17)
HGB UR QL STRIP.AUTO: NEGATIVE
IMM GRANULOCYTES # BLD AUTO: 0.07 THOUSAND/UL (ref 0–0.2)
IMM GRANULOCYTES NFR BLD AUTO: 0 % (ref 0–2)
KETONES UR STRIP-MCNC: NEGATIVE MG/DL
LACTATE SERPL-SCNC: 1.4 MMOL/L (ref 0.5–2)
LEUKOCYTE ESTERASE UR QL STRIP: NEGATIVE
LIPASE SERPL-CCNC: 90 U/L (ref 73–393)
LYMPHOCYTES # BLD AUTO: 1.72 THOUSANDS/ΜL (ref 0.6–4.47)
LYMPHOCYTES NFR BLD AUTO: 10 % (ref 14–44)
MAGNESIUM SERPL-MCNC: 1.8 MG/DL (ref 1.6–2.6)
MCH RBC QN AUTO: 19.1 PG (ref 26.8–34.3)
MCHC RBC AUTO-ENTMCNC: 29.6 G/DL (ref 31.4–37.4)
MCV RBC AUTO: 65 FL (ref 82–98)
MONOCYTES # BLD AUTO: 1.95 THOUSAND/ΜL (ref 0.17–1.22)
MONOCYTES NFR BLD AUTO: 12 % (ref 4–12)
NEUTROPHILS # BLD AUTO: 12.7 THOUSANDS/ΜL (ref 1.85–7.62)
NEUTS SEG NFR BLD AUTO: 76 % (ref 43–75)
NITRITE UR QL STRIP: NEGATIVE
NRBC BLD AUTO-RTO: 0 /100 WBCS
PH UR STRIP.AUTO: 6.5 [PH] (ref 5–9)
PHOSPHATE SERPL-MCNC: 3.3 MG/DL (ref 2.7–4.5)
PLATELET # BLD AUTO: 594 THOUSANDS/UL (ref 149–390)
PMV BLD AUTO: 8.9 FL (ref 8.9–12.7)
POTASSIUM SERPL-SCNC: 4.5 MMOL/L (ref 3.5–5.3)
POTASSIUM SERPL-SCNC: 4.8 MMOL/L (ref 3.5–5.3)
PROT SERPL-MCNC: 8.2 G/DL (ref 6.4–8.2)
PROT UR STRIP-MCNC: NEGATIVE MG/DL
RBC # BLD AUTO: 8.07 MILLION/UL (ref 3.88–5.62)
SODIUM SERPL-SCNC: 130 MMOL/L (ref 136–145)
SODIUM SERPL-SCNC: 130 MMOL/L (ref 136–145)
SP GR UR STRIP.AUTO: <=1.005 (ref 1–1.03)
UROBILINOGEN UR QL STRIP.AUTO: 0.2 E.U./DL
WBC # BLD AUTO: 16.71 THOUSAND/UL (ref 4.31–10.16)

## 2019-02-09 PROCEDURE — 84100 ASSAY OF PHOSPHORUS: CPT | Performed by: NURSE PRACTITIONER

## 2019-02-09 PROCEDURE — 85652 RBC SED RATE AUTOMATED: CPT | Performed by: NURSE PRACTITIONER

## 2019-02-09 PROCEDURE — 87081 CULTURE SCREEN ONLY: CPT | Performed by: INTERNAL MEDICINE

## 2019-02-09 PROCEDURE — 84145 PROCALCITONIN (PCT): CPT | Performed by: NURSE PRACTITIONER

## 2019-02-09 PROCEDURE — 83605 ASSAY OF LACTIC ACID: CPT | Performed by: PHYSICIAN ASSISTANT

## 2019-02-09 PROCEDURE — 36415 COLL VENOUS BLD VENIPUNCTURE: CPT | Performed by: PHYSICIAN ASSISTANT

## 2019-02-09 PROCEDURE — 85025 COMPLETE CBC W/AUTO DIFF WBC: CPT | Performed by: PHYSICIAN ASSISTANT

## 2019-02-09 PROCEDURE — 83690 ASSAY OF LIPASE: CPT | Performed by: PHYSICIAN ASSISTANT

## 2019-02-09 PROCEDURE — 96374 THER/PROPH/DIAG INJ IV PUSH: CPT

## 2019-02-09 PROCEDURE — 99285 EMERGENCY DEPT VISIT HI MDM: CPT

## 2019-02-09 PROCEDURE — 86140 C-REACTIVE PROTEIN: CPT | Performed by: NURSE PRACTITIONER

## 2019-02-09 PROCEDURE — 74177 CT ABD & PELVIS W/CONTRAST: CPT

## 2019-02-09 PROCEDURE — 81003 URINALYSIS AUTO W/O SCOPE: CPT | Performed by: PHYSICIAN ASSISTANT

## 2019-02-09 PROCEDURE — 80048 BASIC METABOLIC PNL TOTAL CA: CPT | Performed by: NURSE PRACTITIONER

## 2019-02-09 PROCEDURE — 99219 PR INITIAL OBSERVATION CARE/DAY 50 MINUTES: CPT | Performed by: STUDENT IN AN ORGANIZED HEALTH CARE EDUCATION/TRAINING PROGRAM

## 2019-02-09 PROCEDURE — C9113 INJ PANTOPRAZOLE SODIUM, VIA: HCPCS | Performed by: PHYSICIAN ASSISTANT

## 2019-02-09 PROCEDURE — 96361 HYDRATE IV INFUSION ADD-ON: CPT

## 2019-02-09 PROCEDURE — 83735 ASSAY OF MAGNESIUM: CPT | Performed by: NURSE PRACTITIONER

## 2019-02-09 PROCEDURE — 87040 BLOOD CULTURE FOR BACTERIA: CPT | Performed by: PHYSICIAN ASSISTANT

## 2019-02-09 PROCEDURE — 80053 COMPREHEN METABOLIC PANEL: CPT | Performed by: PHYSICIAN ASSISTANT

## 2019-02-09 PROCEDURE — 96375 TX/PRO/DX INJ NEW DRUG ADDON: CPT

## 2019-02-09 RX ORDER — SODIUM CHLORIDE 9 MG/ML
125 INJECTION, SOLUTION INTRAVENOUS CONTINUOUS
Status: DISCONTINUED | OUTPATIENT
Start: 2019-02-09 | End: 2019-02-10 | Stop reason: HOSPADM

## 2019-02-09 RX ORDER — MORPHINE SULFATE 10 MG/ML
6 INJECTION, SOLUTION INTRAMUSCULAR; INTRAVENOUS ONCE
Status: COMPLETED | OUTPATIENT
Start: 2019-02-09 | End: 2019-02-09

## 2019-02-09 RX ORDER — ONDANSETRON 2 MG/ML
4 INJECTION INTRAMUSCULAR; INTRAVENOUS ONCE
Status: COMPLETED | OUTPATIENT
Start: 2019-02-09 | End: 2019-02-09

## 2019-02-09 RX ORDER — ONDANSETRON 2 MG/ML
4 INJECTION INTRAMUSCULAR; INTRAVENOUS ONCE
Status: DISCONTINUED | OUTPATIENT
Start: 2019-02-09 | End: 2019-02-09

## 2019-02-09 RX ORDER — HYDROMORPHONE HCL/PF 1 MG/ML
1 SYRINGE (ML) INJECTION ONCE
Status: COMPLETED | OUTPATIENT
Start: 2019-02-09 | End: 2019-02-09

## 2019-02-09 RX ORDER — HYDROMORPHONE HCL/PF 1 MG/ML
1 SYRINGE (ML) INJECTION
Status: DISCONTINUED | OUTPATIENT
Start: 2019-02-09 | End: 2019-02-09

## 2019-02-09 RX ORDER — SACCHAROMYCES BOULARDII 250 MG
250 CAPSULE ORAL 2 TIMES DAILY
Status: DISCONTINUED | OUTPATIENT
Start: 2019-02-09 | End: 2019-02-10 | Stop reason: HOSPADM

## 2019-02-09 RX ORDER — PANTOPRAZOLE SODIUM 40 MG/1
40 INJECTION, POWDER, FOR SOLUTION INTRAVENOUS ONCE
Status: COMPLETED | OUTPATIENT
Start: 2019-02-09 | End: 2019-02-09

## 2019-02-09 RX ORDER — ACETAMINOPHEN AND CODEINE PHOSPHATE 300; 60 MG/1; MG/1
1 TABLET ORAL EVERY 6 HOURS PRN
COMMUNITY
End: 2019-04-09 | Stop reason: ALTCHOICE

## 2019-02-09 RX ORDER — ONDANSETRON 2 MG/ML
4 INJECTION INTRAMUSCULAR; INTRAVENOUS EVERY 6 HOURS PRN
Status: DISCONTINUED | OUTPATIENT
Start: 2019-02-09 | End: 2019-02-10 | Stop reason: HOSPADM

## 2019-02-09 RX ORDER — HYDROMORPHONE HCL/PF 1 MG/ML
0.5 SYRINGE (ML) INJECTION
Status: DISCONTINUED | OUTPATIENT
Start: 2019-02-09 | End: 2019-02-10 | Stop reason: HOSPADM

## 2019-02-09 RX ORDER — CYCLOBENZAPRINE HCL 10 MG
10 TABLET ORAL EVERY 8 HOURS PRN
Status: DISCONTINUED | OUTPATIENT
Start: 2019-02-09 | End: 2019-02-10 | Stop reason: HOSPADM

## 2019-02-09 RX ORDER — HYDROMORPHONE HCL/PF 1 MG/ML
0.2 SYRINGE (ML) INJECTION
Status: DISCONTINUED | OUTPATIENT
Start: 2019-02-09 | End: 2019-02-10 | Stop reason: HOSPADM

## 2019-02-09 RX ORDER — ACETAMINOPHEN 325 MG/1
650 TABLET ORAL EVERY 6 HOURS PRN
Status: DISCONTINUED | OUTPATIENT
Start: 2019-02-09 | End: 2019-02-10 | Stop reason: HOSPADM

## 2019-02-09 RX ORDER — CYCLOBENZAPRINE HCL 10 MG
10 TABLET ORAL ONCE
Status: COMPLETED | OUTPATIENT
Start: 2019-02-09 | End: 2019-02-09

## 2019-02-09 RX ORDER — HYDROMORPHONE HCL/PF 1 MG/ML
0.5 SYRINGE (ML) INJECTION
Status: DISCONTINUED | OUTPATIENT
Start: 2019-02-09 | End: 2019-02-09

## 2019-02-09 RX ORDER — FAMOTIDINE 20 MG/1
20 TABLET, FILM COATED ORAL DAILY
Status: DISCONTINUED | OUTPATIENT
Start: 2019-02-09 | End: 2019-02-10 | Stop reason: HOSPADM

## 2019-02-09 RX ADMIN — CYCLOBENZAPRINE HYDROCHLORIDE 10 MG: 10 TABLET, FILM COATED ORAL at 14:17

## 2019-02-09 RX ADMIN — MORPHINE SULFATE 6 MG: 10 INJECTION INTRAVENOUS at 12:28

## 2019-02-09 RX ADMIN — HYDROMORPHONE HYDROCHLORIDE 1 MG: 1 INJECTION, SOLUTION INTRAMUSCULAR; INTRAVENOUS; SUBCUTANEOUS at 13:34

## 2019-02-09 RX ADMIN — HYDROMORPHONE HYDROCHLORIDE 0.5 MG: 1 INJECTION, SOLUTION INTRAMUSCULAR; INTRAVENOUS; SUBCUTANEOUS at 23:22

## 2019-02-09 RX ADMIN — IOHEXOL 80 ML: 350 INJECTION, SOLUTION INTRAVENOUS at 13:17

## 2019-02-09 RX ADMIN — CYCLOBENZAPRINE HYDROCHLORIDE 10 MG: 10 TABLET, FILM COATED ORAL at 19:51

## 2019-02-09 RX ADMIN — PANTOPRAZOLE SODIUM 40 MG: 40 INJECTION, POWDER, FOR SOLUTION INTRAVENOUS at 12:28

## 2019-02-09 RX ADMIN — HYDROMORPHONE HYDROCHLORIDE 0.5 MG: 1 INJECTION, SOLUTION INTRAMUSCULAR; INTRAVENOUS; SUBCUTANEOUS at 20:22

## 2019-02-09 RX ADMIN — PIPERACILLIN SODIUM,TAZOBACTAM SODIUM 3.38 G: 3; .375 INJECTION, POWDER, FOR SOLUTION INTRAVENOUS at 14:52

## 2019-02-09 RX ADMIN — SODIUM CHLORIDE 100 ML/HR: 0.9 INJECTION, SOLUTION INTRAVENOUS at 18:09

## 2019-02-09 RX ADMIN — FAMOTIDINE 20 MG: 20 TABLET ORAL at 18:07

## 2019-02-09 RX ADMIN — SODIUM CHLORIDE 1000 ML: 0.9 INJECTION, SOLUTION INTRAVENOUS at 12:27

## 2019-02-09 RX ADMIN — PSYLLIUM HUSK 1 PACKET: 3.4 POWDER ORAL at 16:55

## 2019-02-09 RX ADMIN — ONDANSETRON 4 MG: 2 INJECTION INTRAMUSCULAR; INTRAVENOUS at 12:28

## 2019-02-09 RX ADMIN — HYDROMORPHONE HYDROCHLORIDE 0.5 MG: 1 INJECTION, SOLUTION INTRAMUSCULAR; INTRAVENOUS; SUBCUTANEOUS at 17:19

## 2019-02-09 RX ADMIN — Medication 250 MG: at 18:14

## 2019-02-09 NOTE — ASSESSMENT & PLAN NOTE
· Most likely prerenal   · Creatinine 1 69   · IV hydration  · Avoid nephrotoxins  · Repeat lab in [facility]

## 2019-02-09 NOTE — ASSESSMENT & PLAN NOTE
WBC 16 71  Most likely secondary to gastroenteritis  Patient afebrile  IV hydration  Repeat CBC in a [facility] Check procalcitonin

## 2019-02-09 NOTE — H&P
H&P- Angelika [facility] 1995, 20 y o  male MRN: [id number]    Unit/Bed#: John Encounter: [id number]    Primary Care Provider: Ashley Rosa DO   Date and time admitted to hospital: 2/9/2019 11:55 AM        * Abdominal pain with hx of Ulcerative Colitis   Assessment & Plan    CT abdomen pelvis showed status post colectomy and J-pouch  Distal bowel wall thickening with air-fluid levels could relate to enteritis  History of UC,s/p total colectomy in 2015  Low suspicion for ulcerative colitis exacerbation  Denies bloody diarrhea  Most likely gastroenteritis  Patient was admitted here last month for abdominal pain/nausea/vomiting  Thought it was due to intermittent bowel obstruction from scar tissues per GI note  Check ESR, CRP  Check stool C diff, stool culture  Clear liquid diet  IV hydration  Pain control  Antiemetic p r n  Consult GI  SARAHI (acute kidney injury) ([facility] [address] )   Assessment & Plan    · Most likely prerenal   · Creatinine 1 69   · IV hydration  · Avoid nephrotoxins  · Repeat lab in a [facility] Hyponatremia   Assessment & Plan    Hypovolemic hyponatremia  Sodium 130  Patient received normal saline 1000 mL in ED  Continue IV normal saline at 100 ml/hr  Repeat BMP tonight and in AM         Leukocytosis   Assessment & Plan    WBC 16 71  Most likely secondary to gastroenteritis  Patient afebrile  IV hydration  Repeat CBC in a [facility] Check procalcitonin  Thrombocytosis ([facility] [address] )   Assessment & Plan    Acute on chronic  Platelet 233  Likely secondary to hemoconcentration from dehydration  Monitor  CAR (generalized anxiety disorder)   Assessment & Plan    Continue Zoloft  Clostridium difficile colitis   Assessment & Plan    History of C diff  Check stool C diff, stool culture  Avoid antibiotic if possible               VTE Prophylaxis: Pharmacologic VTE Prophylaxis contraindicated due to low risk  / sequential compression device   Code Status: Full code  POLST: POLST form is not discussed and not completed at this time  Anticipated Length of Stay:  Patient will be admitted on an Observation basis with an anticipated length of stay of  < 2 midnights  Justification for Hospital Stay:  Abdominal pain, nausea, vomiting  Total Time for Visit, including Counseling / Coordination of Care: 45 minutes  Greater than 50% of this total time spent on direct patient counseling and coordination of care  Chief Complaint:   Abdomen pain, nausea, vomiting for a couple of days  History of Present Illness:    Celestino Abad is a 20 y o  male with PMH of UC, status post total colectomy in 2015, anxiety, C diff colitis, pancreatitis who presents with abdominal pain, nausea, vomiting for a couple of days  Patient reports diffuse abdominal pain, intermittent, burning like pain, no apparent modifying factors, associated with nausea, vomiting, worsening diarrhea for past few days  He spoke to his PCP yesterday, was prescribed Tylenol # 4 without relief  He reports poor appetite in past couple of days  He reports chronic diarrhea since his colectomy  He noticed worsening diarrhea in past few days, nonbloody loose BM  He reports feeling nauseous, vomited twice yesterday with stomach content  He reports he was admitted to this hospital a couple of weeks ago for the same and was told to see his surgeon  He has appointment on 2/25th with him  He denies chest pain, headaches, dizziness, SOB, fever or chills  He denies URI or UTI symptoms  He reports drinking alcohol a couple times per week  Denies history of alcohol withdrawal     In ED, CT scan showed possible enteritis  Blood work showed hyponatremia, acute kidney injury, leukocytosis and hemoconcentration  Patient received normal saline 1000 mL, IV Zosyn, IV morphine, IV Dilaudid, IV Zofran, Flexeril p o , Protonix p o  In ED  Review of Systems:    Review of Systems   Constitutional: Positive for appetite change  Gastrointestinal: Positive for abdominal pain, diarrhea, nausea and vomiting  Psychiatric/Behavioral: The patient is nervous/anxious  All other systems reviewed and are negative  Past Medical and Surgical History:     Past Medical History:   Diagnosis Date    Anxiety     Clostridium difficile colitis 9/13/2018    Colitis     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[address] ) 9/13/2018    Pancreatitis        Past Surgical History:   Procedure Laterality Date    COLECTOMY TOTAL      with ileal pouch and anastemosis    TOTAL COLECTOMY         Meds/Allergies:    Prior to Admission medications    Medication Sig Start Date End Date Taking? Authorizing Provider   acetaminophen-codeine (TYLENOL with CODEINE #4) 300-60 MG per tablet Take 1 tablet by mouth every 6 (six) hours as needed for moderate pain (abdominal pain)   Yes Historical Provider, MD   psyllium (METAMUCIL) 58 6 % packet Take 1 packet by mouth 3 (three) times a day   Yes Historical Provider, MD   sertraline (ZOLOFT) 50 mg tablet Take 1 tablet (50 mg total) by mouth daily 10/29/18  Yes Rashawn Norris, DO   acetaminophen-codeine (TYLENOL #4) 300-60 MG per tablet Take 1 tablet by mouth every 6 (six) hours as needed for severe pain 2/8/19 2/9/19  Rashawn Norris, DO   Ergocalciferol (VITAMIN D2 PO) Vitamin D2 50,000 unit capsule  2/9/19  Historical Provider, MD   psyllium (METAMUCIL) 58 6 % powder Take 1 packet by mouth 3 (three) times a day  2/9/19  Historical Provider, MD     I have reviewed home medications with patient personally  Allergies:    Allergies   Allergen Reactions    Mesalamine GI Intolerance     Other reaction(s): Pancreatitis  Annotation - [id number]: pancreatitis       Social History:     Marital Status: Single   Occupation: staff at [employer]  Patient Pre-hospital Living Situation:  Family  Patient Pre-hospital Level of Mobility:  Independent  Patient Pre-hospital Diet Restrictions:  None  Substance Use History:   History   Alcohol Use    1 8 oz/week  3 Standard drinks or equivalent per week     Comment:  weekly     History   Smoking Status    Never Smoker   Smokeless Tobacco    Never Used     History   Drug Use No       Family History:    History reviewed  No pertinent family history  Physical Exam:     Vitals:   Blood Pressure: 135/59 (02/09/19 1454)  Pulse: 89 (02/09/19 1454)  Temperature: 98 1 °F (36 7 °C) (02/09/19 1152)  Respirations: 18 (02/09/19 1454)  Height: 5' 9" (175 3 cm) (02/09/19 1152)  Weight - Scale: 86 5 kg (190 lb 11 2 oz) (02/09/19 1152)  SpO2: 97 % (02/09/19 1454)    Physical Exam   Constitutional: He is oriented to person, place, and time  He appears well-developed  Patient appears dehydrated  HENT:   Head: Normocephalic and atraumatic  Neck: Normal range of motion  Neck supple  Cardiovascular: Regular rhythm  Exam reveals no gallop and no friction rub  No murmur heard  Mild tachycardic  Pulmonary/Chest: Effort normal and breath sounds normal  No respiratory distress  He has no wheezes  He has no rales  Abdominal: Soft  Bowel sounds are normal  He exhibits no distension  There is tenderness  There is no rebound  Left upper quadrant and epigastric slight tender  Old surgical scar on abdomen  Musculoskeletal: Normal range of motion  He exhibits no edema, tenderness or deformity  Neurological: He is alert and oriented to person, place, and time  Skin: Skin is warm and dry  Psychiatric: He has a normal mood and affect  Nursing note and vitals reviewed  Additional Data:     Lab Results: I have personally reviewed pertinent reports          Results from last 7 days  Lab Units 02/09/19  1227   WBC Thousand/uL 16 71*   HEMOGLOBIN g/dL 15 4   HEMATOCRIT % 52 1*   PLATELETS Thousands/uL 594*   NEUTROS PCT % 76*   LYMPHS PCT % 10*   MONOS PCT % 12   EOS PCT % 1       Results from last 7 days  Lab Units 02/09/19  1227   POTASSIUM mmol/L 4 8   CHLORIDE mmol/L 95*   CO2 mmol/L 25   BUN mg/dL 14   CREATININE mg/dL 1 69*   CALCIUM mg/dL 9 4   ALK PHOS U/L 115   ALT U/L 50   AST U/L 36           Imaging: I have personally reviewed pertinent reports  Ct Abdomen Pelvis With Contrast    Result Date: 2/9/2019  Narrative: CT ABDOMEN AND PELVIS WITH IV CONTRAST INDICATION:   Generalized abdominal pain, diffuse abdominal pain  COMPARISON:  1/5/2019 and October 8, 2016 TECHNIQUE:  CT examination of the abdomen and pelvis was performed  Axial, sagittal, and coronal 2D reformatted images were created from the source data and submitted for interpretation  Radiation dose length product (DLP) for this visit:  493 5 mGy-cm   This examination, like all CT scans performed in the [facility], was performed utilizing techniques to minimize radiation dose exposure, including the use of iterative reconstruction and automated exposure control  IV Contrast:  80 mL of iohexol (OMNIPAQUE) Enteric Contrast:  Enteric contrast was not administered  FINDINGS: ABDOMEN LOWER CHEST:  No clinically significant abnormality identified in the visualized lower chest  LIVER/BILIARY TREE:  Unremarkable  GALLBLADDER:  No calcified gallstones  No pericholecystic inflammatory change  SPLEEN:  Unremarkable  PANCREAS:  Unremarkable  ADRENAL GLANDS:  Unremarkable  KIDNEYS/URETERS:  Unremarkable  No hydronephrosis  STOMACH AND BOWEL:  Status post colectomy and J-pouch as previously described  Distal bowel wall thickening with air-fluid levels could relate to enteritis  APPENDIX:  No findings to suggest appendicitis  ABDOMINOPELVIC CAVITY:  No ascites or free intraperitoneal air  1 6 cm nodular soft tissue density /structure identified along the right common iliac vessels is seen dating back to 2016  VESSELS:  Unremarkable for patient's age  PELVIS REPRODUCTIVE ORGANS:  Unremarkable for patient's age  URINARY BLADDER:  Unremarkable  ABDOMINAL WALL/INGUINAL REGIONS:  Unremarkable  OSSEOUS STRUCTURES:  No acute fracture or destructive osseous lesion  Impression: Suspect enteritis  Workstation performed: [id number]       EKG, Pathology, and Other Studies Reviewed on Admission:   · EKG:  Not applicable    All[employer] Records Reviewed: Yes     ** Please Note: Dragon 360 Dictation voice to text software may have been used in the creation of this document   **

## 2019-02-09 NOTE — ED PROVIDER NOTES
History  Chief Complaint   Patient presents with    Abdominal Pain     Patient complains of abd pain that started three days ago  40-year-old male history of ulcerative pancolitis presents with abdominal pain x3 days  The abdominal pain is a burning sensation generalized around his abdomen  He denies any radiation  He feels nauseous  He vomited twice yesterday  No blood in the vomit  He denies any blood in the stool  He states he is having severe hiccups which are also painful  They began today  He does not typically get hiccups  No sick contacts  No recent travel  No new foods  No fever, chills, chest pain, shortness of breath, difficulty breathing, headache, dizziness, lightheadedness, weakness  Prior to Admission Medications   Prescriptions Last Dose Informant Patient Reported? Taking?   sertraline (ZOLOFT) 50 mg tablet   No Yes   Sig: Take 1 tablet (50 mg total) by mouth daily      Facility-Administered Medications: None       Past Medical History:   Diagnosis Date    Clostridium difficile colitis 9/13/2018    Colitis     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[facility] [address] ) 9/13/2018    Pancreatitis        Past Surgical History:   Procedure Laterality Date    TOTAL COLECTOMY         History reviewed  No pertinent family history  I have reviewed and agree with the history as documented  Social History   Substance Use Topics    Smoking status: Never Smoker    Smokeless tobacco: Never Used    Alcohol use Yes      Comment: social        Review of Systems   Constitutional: Negative for chills and fever  HENT: Negative for sneezing and sore throat  Respiratory: Negative for cough and shortness of breath  Cardiovascular: Negative for chest pain, palpitations and leg swelling  Gastrointestinal: Positive for abdominal pain, nausea and vomiting  Negative for constipation and diarrhea  Musculoskeletal: Negative for back pain, gait problem and joint swelling     Skin: Negative for color change, pallor, rash and wound  Neurological: Negative for dizziness, syncope, weakness, light-headedness, numbness and headaches  All other systems reviewed and are negative  Physical Exam  Physical Exam   Constitutional: He appears well-developed and well-nourished  No distress  HENT:   Head: Normocephalic and atraumatic  Nose: Nose normal    Eyes: EOM are normal    Neck: Normal range of motion  Cardiovascular: Normal rate, regular rhythm, normal heart sounds and intact distal pulses  Exam reveals no gallop and no friction rub  No murmur heard  Pulmonary/Chest: Effort normal and breath sounds normal  No respiratory distress  He has no wheezes  He has no rales  Sp02 is 97% indicating adequate oxygenation on room air   Abdominal: Soft  Normal appearance and bowel sounds are normal  He exhibits no distension and no mass  There is generalized tenderness  There is no rigidity, no rebound, no guarding, no CVA tenderness, no tenderness at McBurney's point and negative Beatty's sign  Skin: Skin is warm and dry  Capillary refill takes less than 2 seconds  No rash noted  He is not diaphoretic  No erythema  No pallor  Nursing note and vitals reviewed        Vital Signs  ED Triage Vitals   Temperature Pulse Respirations Blood Pressure SpO2   02/09/19 1152 02/09/19 1152 02/09/19 1152 02/09/19 1152 02/09/19 1152   98 1 °F (36 7 °C) 62 18 117/81 98 %      Temp src Heart Rate Source Patient Position - Orthostatic VS BP Location FiO2 (%)   -- 02/09/19 1245 02/09/19 1245 02/09/19 1245 --    Monitor Lying Right arm       Pain Score       02/09/19 1152       7           Vitals:    02/09/19 1245 02/09/19 1300 02/09/19 1400 02/09/19 1454   BP: 132/78 124/70 133/61 135/59   Pulse: 90 94 84 89   Patient Position - Orthostatic VS: Lying Lying  Lying       Visual Acuity      ED Medications  Medications   piperacillin-tazobactam (ZOSYN) IVPB 3 375 g (3 375 g Intravenous New Bag 2/9/19 1452)   sodium chloride 0 9 % bolus 1,000 mL (0 mL Intravenous Stopped 2/9/19 1435)   morphine (PF) 10 mg/mL injection 6 mg (6 mg Intravenous Given 2/9/19 1228)   ondansetron (ZOFRAN) injection 4 mg (4 mg Intravenous Given 2/9/19 1228)   pantoprazole (PROTONIX) injection 40 mg (40 mg Intravenous Given 2/9/19 1228)   iohexol (OMNIPAQUE) 350 MG/ML injection (MULTI-DOSE) 80 mL (80 mL Intravenous Given 2/9/19 1317)   HYDROmorphone (DILAUDID) injection 1 mg (1 mg Intravenous Given 2/9/19 1334)   cyclobenzaprine (FLEXERIL) tablet 10 mg (10 mg Oral Given 2/9/19 1417)       Diagnostic Studies  Results Reviewed     Procedure Component Value Units Date/Time    UA (URINE) with reflex to Microscopic [[id number]] Collected:  02/09/19 1417    Lab Status:  Final result Specimen:  Urine from Urine, Clean Catch Updated:  02/09/19 1428     Color, UA Colorless     Clarity, UA Clear     Specific Gravity, UA <=1 005     pH, UA 6 5     Leukocytes, UA Negative     Nitrite, UA Negative     Protein, UA Negative mg/dl      Glucose, UA Negative mg/dl      Ketones, UA Negative mg/dl      Urobilinogen, UA 0 2 E U /dl      Bilirubin, UA Negative     Blood, UA Negative    Lactic acid, plasma [[id number]]  (Normal) Collected:  02/09/19 1302    Lab Status:  Final result Specimen:  Blood from Arm, Left Updated:  02/09/19 1330     LACTIC ACID 1 4 mmol/L     Narrative:         Result may be elevated if tourniquet was used during collection  Blood culture #2 [[id number]] Collected:  02/09/19 1310    Lab Status: In process Specimen:  Blood from Arm, Right Updated:  02/09/19 1315    Blood culture #1 [[id number]] Collected:  02/09/19 1302    Lab Status:   In process Specimen:  Blood from Arm, Left Updated:  02/09/19 1308    Comprehensive metabolic panel [[id number]]  (Abnormal) Collected:  02/09/19 1227    Lab Status:  Final result Specimen:  Blood from Arm, Left Updated:  02/09/19 1253     Sodium 130 (L) mmol/L      Potassium 4 8 mmol/L      Chloride 95 (L) mmol/L      CO2 25 mmol/L      ANION GAP 10 mmol/L      BUN 14 mg/dL      Creatinine 1 69 (H) mg/dL      Glucose 86 mg/dL      Calcium 9 4 mg/dL      AST 36 U/L      ALT 50 U/L      Alkaline Phosphatase 115 U/L      Total Protein 8 2 g/dL      Albumin 3 9 g/dL      Total Bilirubin 0 60 mg/dL      eGFR 55 ml/min/1 73sq m     Narrative:         National Kidney Disease Education Program recommendations are as follows:  GFR calculation is accurate only with a steady state creatinine  Chronic Kidney disease less than 60 ml/min/1 73 sq  meters  Kidney failure less than 15 ml/min/1 73 sq  meters  Lipase [[id number]]  (Normal) Collected:  02/09/19 1227    Lab Status:  Final result Specimen:  Blood from Arm, Left Updated:  02/09/19 1253     Lipase 90 u/L     CBC and differential [[id number]]  (Abnormal) Collected:  02/09/19 1227    Lab Status:  Final result Specimen:  Blood from Arm, Left Updated:  02/09/19 1237     WBC 16 71 (H) Thousand/uL      RBC 8 07 (H) Million/uL      Hemoglobin 15 4 g/dL      Hematocrit 52 1 (H) %      MCV 65 (L) fL      MCH 19 1 (L) pg      MCHC 29 6 (L) g/dL      RDW 18 6 (H) %      MPV 8 9 fL      Platelets 329 (H) Thousands/uL      nRBC 0 /100 WBCs      Neutrophils Relative 76 (H) %      Immat GRANS % 0 %      Lymphocytes Relative 10 (L) %      Monocytes Relative 12 %      Eosinophils Relative 1 %      Basophils Relative 1 %      Neutrophils Absolute 12 70 (H) Thousands/µL      Immature Grans Absolute 0 07 Thousand/uL      Lymphocytes Absolute 1 72 Thousands/µL      Monocytes Absolute 1 95 (H) Thousand/µL      Eosinophils Absolute 0 15 Thousand/µL      Basophils Absolute 0 12 (H) Thousands/µL                  CT abdomen pelvis with contrast   Final Result by Alba Allen MD (02/09 1333)      Suspect enteritis              Workstation performed: [id number]                    Procedures  Procedures       Phone Contacts  ED Phone Contact    ED Course                               MDM  Number of Diagnoses or Management Options  SARAHI (acute kidney injury) [facility]):   Ulcerative pancolitis [facility]):   Diagnosis management comments: Will admit for IV abx, fluids, and pain control       Amount and/or Complexity of Data Reviewed  Clinical lab tests: ordered and reviewed  Tests in the radiology section of CPT®: reviewed and ordered  Tests in the medicine section of CPT®: ordered and reviewed  Discussion of test results with the performing providers: yes  Review and summarize past medical records: yes  Discuss the patient with other providers: yes  Independent visualization of images, tracings, or specimens: yes        Disposition  Final diagnoses:   Ulcerative pancolitis ([facility] )   SARAHI (acute kidney injury) ([place] [address] )     Time reflects when diagnosis was documented in both MDM as applicable and the Disposition within this note     Time User Action Codes Description Comment    2/9/2019  2:29 PM Lorene Argue Add [K51 00] Ulcerative pancolitis ([facility] )     2/9/2019  2:29 PM Lorene Ch Add [N17 9] SARAHI (acute kidney injury) [facility])       ED Disposition     ED Disposition Condition Date/Time Comment    Admit  Sat Feb 9, 2019  2:29 PM Case was discussed with Dr Philly Montaño and the patient's admission status was agreed to be Admission Status: observation status to the service of Dr Philly Montaño   Follow-up Information    None         Patient's Medications   Discharge Prescriptions    No medications on file     No discharge procedures on file      ED Provider  Electronically Signed by           Felecia Spring PA-C  02/09/19 2808

## 2019-02-09 NOTE — ASSESSMENT & PLAN NOTE
Hypovolemic hyponatremia  Sodium 130  Patient received normal saline 1000 mL in ED  Continue IV normal saline at 100 ml/hr    Repeat BMP tonight and in AM

## 2019-02-09 NOTE — ASSESSMENT & PLAN NOTE
CT abdomen pelvis showed status post colectomy and J-pouch  Distal bowel wall thickening with air-fluid levels could relate to enteritis  History of UC,s/p total colectomy in 2015  Low suspicion for ulcerative colitis exacerbation  Denies bloody diarrhea  Most likely gastroenteritis  Patient was admitted here last month for abdominal pain/nausea/vomiting  Thought it was due to intermittent bowel obstruction from scar tissues per GI note  Check ESR, CRP  Check stool C diff, stool culture  Clear liquid diet  IV hydration  Pain control  Antiemetic p r n  Consult GI

## 2019-02-10 VITALS
RESPIRATION RATE: 20 BRPM | BODY MASS INDEX: 28.24 KG/M2 | TEMPERATURE: 97.9 F | WEIGHT: 190.7 LBS | HEIGHT: 69 IN | OXYGEN SATURATION: 96 % | SYSTOLIC BLOOD PRESSURE: 130 MMHG | HEART RATE: 96 BPM | DIASTOLIC BLOOD PRESSURE: 68 MMHG

## 2019-02-10 PROBLEM — D72.829 LEUKOCYTOSIS: Status: RESOLVED | Noted: 2019-01-05 | Resolved: 2019-02-10

## 2019-02-10 LAB
ANION GAP SERPL CALCULATED.3IONS-SCNC: 4 MMOL/L (ref 4–13)
BASOPHILS # BLD AUTO: 0.14 THOUSANDS/ΜL (ref 0–0.1)
BASOPHILS NFR BLD AUTO: 2 % (ref 0–1)
BUN SERPL-MCNC: 10 MG/DL (ref 5–25)
CALCIUM SERPL-MCNC: 8.6 MG/DL (ref 8.3–10.1)
CHLORIDE SERPL-SCNC: 102 MMOL/L (ref 100–108)
CO2 SERPL-SCNC: 26 MMOL/L (ref 21–32)
CREAT SERPL-MCNC: 1.33 MG/DL (ref 0.6–1.3)
CRP SERPL QL: 44.7 MG/L
EOSINOPHIL # BLD AUTO: 0.32 THOUSAND/ΜL (ref 0–0.61)
EOSINOPHIL NFR BLD AUTO: 4 % (ref 0–6)
ERYTHROCYTE [DISTWIDTH] IN BLOOD BY AUTOMATED COUNT: 17.2 % (ref 11.6–15.1)
ERYTHROCYTE [SEDIMENTATION RATE] IN BLOOD: 3 MM/HOUR (ref 2–10)
GFR SERPL CREATININE-BSD FRML MDRD: 74 ML/MIN/1.73SQ M
GLUCOSE P FAST SERPL-MCNC: 85 MG/DL (ref 65–99)
GLUCOSE SERPL-MCNC: 85 MG/DL (ref 65–140)
HCT VFR BLD AUTO: 44.2 % (ref 36.5–49.3)
HGB BLD-MCNC: 13 G/DL (ref 12–17)
IMM GRANULOCYTES # BLD AUTO: 0.05 THOUSAND/UL (ref 0–0.2)
IMM GRANULOCYTES NFR BLD AUTO: 1 % (ref 0–2)
LYMPHOCYTES # BLD AUTO: 1.37 THOUSANDS/ΜL (ref 0.6–4.47)
LYMPHOCYTES NFR BLD AUTO: 17 % (ref 14–44)
MAGNESIUM SERPL-MCNC: 2 MG/DL (ref 1.6–2.6)
MCH RBC QN AUTO: 19.2 PG (ref 26.8–34.3)
MCHC RBC AUTO-ENTMCNC: 29.4 G/DL (ref 31.4–37.4)
MCV RBC AUTO: 65 FL (ref 82–98)
MONOCYTES # BLD AUTO: 1.61 THOUSAND/ΜL (ref 0.17–1.22)
MONOCYTES NFR BLD AUTO: 20 % (ref 4–12)
NEUTROPHILS # BLD AUTO: 4.76 THOUSANDS/ΜL (ref 1.85–7.62)
NEUTS SEG NFR BLD AUTO: 56 % (ref 43–75)
NRBC BLD AUTO-RTO: 0 /100 WBCS
PHOSPHATE SERPL-MCNC: 3.6 MG/DL (ref 2.7–4.5)
PLATELET # BLD AUTO: 425 THOUSANDS/UL (ref 149–390)
PMV BLD AUTO: 8.5 FL (ref 8.9–12.7)
POTASSIUM SERPL-SCNC: 4.7 MMOL/L (ref 3.5–5.3)
PROCALCITONIN SERPL-MCNC: 0.08 NG/ML
RBC # BLD AUTO: 6.77 MILLION/UL (ref 3.88–5.62)
SODIUM SERPL-SCNC: 132 MMOL/L (ref 136–145)
WBC # BLD AUTO: 8.25 THOUSAND/UL (ref 4.31–10.16)

## 2019-02-10 PROCEDURE — 80048 BASIC METABOLIC PNL TOTAL CA: CPT | Performed by: NURSE PRACTITIONER

## 2019-02-10 PROCEDURE — 99217 PR OBSERVATION CARE DISCHARGE MANAGEMENT: CPT | Performed by: NURSE PRACTITIONER

## 2019-02-10 PROCEDURE — 85025 COMPLETE CBC W/AUTO DIFF WBC: CPT | Performed by: NURSE PRACTITIONER

## 2019-02-10 PROCEDURE — 84100 ASSAY OF PHOSPHORUS: CPT | Performed by: NURSE PRACTITIONER

## 2019-02-10 PROCEDURE — 87493 C DIFF AMPLIFIED PROBE: CPT | Performed by: NURSE PRACTITIONER

## 2019-02-10 PROCEDURE — 99254 IP/OBS CNSLTJ NEW/EST MOD 60: CPT | Performed by: INTERNAL MEDICINE

## 2019-02-10 PROCEDURE — 83735 ASSAY OF MAGNESIUM: CPT | Performed by: NURSE PRACTITIONER

## 2019-02-10 PROCEDURE — 87505 NFCT AGENT DETECTION GI: CPT | Performed by: NURSE PRACTITIONER

## 2019-02-10 RX ORDER — FAMOTIDINE 20 MG/1
20 TABLET, FILM COATED ORAL DAILY
Qty: 14 TABLET | Refills: 0 | Status: SHIPPED | OUTPATIENT
Start: 2019-02-11 | End: 2019-04-09 | Stop reason: ALTCHOICE

## 2019-02-10 RX ORDER — CYCLOBENZAPRINE HCL 10 MG
10 TABLET ORAL EVERY 8 HOURS PRN
Qty: 10 TABLET | Refills: 0 | Status: SHIPPED | OUTPATIENT
Start: 2019-02-10 | End: 2019-04-09 | Stop reason: ALTCHOICE

## 2019-02-10 RX ADMIN — SERTRALINE HYDROCHLORIDE 50 MG: 50 TABLET ORAL at 08:28

## 2019-02-10 RX ADMIN — HYDROMORPHONE HYDROCHLORIDE 0.5 MG: 1 INJECTION, SOLUTION INTRAMUSCULAR; INTRAVENOUS; SUBCUTANEOUS at 08:27

## 2019-02-10 RX ADMIN — HYDROMORPHONE HYDROCHLORIDE 0.5 MG: 1 INJECTION, SOLUTION INTRAMUSCULAR; INTRAVENOUS; SUBCUTANEOUS at 03:28

## 2019-02-10 RX ADMIN — Medication 250 MG: at 08:28

## 2019-02-10 RX ADMIN — FAMOTIDINE 20 MG: 20 TABLET ORAL at 08:28

## 2019-02-10 RX ADMIN — SODIUM CHLORIDE 100 ML/HR: 0.9 INJECTION, SOLUTION INTRAVENOUS at 03:31

## 2019-02-10 NOTE — NURSING NOTE
Patient left ambulatory, gait steady to home  Discharge instructions reviewed with patient  Patient verbalized understanding  New information on medications and low fiber low residue diet given  IV access removed prior to discharge  Patient refused flu and pneumo  All personal belongings taken with patient

## 2019-02-10 NOTE — UTILIZATION REVIEW
Initial Clinical Review    Admission: Date/Time/Statement: Placed in observation status on 2/9 @ 14:30  Orders Placed This Encounter   Procedures    Place in Observation (expected length of stay for this patient is less than two midnights)     Standing Status:   Standing     Number of Occurrences:   1     Order Specific Question:   Admitting Physician     Answer:   Kirstie Lopes     Order Specific Question:   Level of Care     Answer:   Med Surg [16]     ED: Date/Time/Mode of Arrival:   ED Arrival Information     Expected Arrival Acuity Means of Arrival Escorted By Service Admission Type    - 2/9/2019 11:49 Urgent Walk-In Family Member Hospitalist Urgent    Arrival Complaint    abdominal pain        Chief Complaint:   Chief Complaint   Patient presents with    Abdominal Pain     Patient complains of abd pain that started three days ago  History of Illness: 19 yo m presents with abd pain -  Diffuse -  Intermittent  burning like pain -  + nausea - vomiting x 2 today  and worsening diarrhea - PCP gave him Tylenol #4 with out relief -  Hx of UC s/p total colectomy in 2015 - anxiety - C diff colitis - pancreatitis  Recent hospitalization for similar - was to follow OP with his surgeon -  Appointment for 2/25 - CT abd  Possible enteritis     ED Vital Signs:   ED Triage Vitals   Temperature Pulse Respirations Blood Pressure SpO2   02/09/19 1152 02/09/19 1152 02/09/19 1152 02/09/19 1152 02/09/19 1152   98 1 °F (36 7 °C) 62 18 117/81 98 %      Temp Source Heart Rate Source Patient Position - Orthostatic VS BP Location FiO2 (%)   02/09/19 1737 02/09/19 1245 02/09/19 1245 02/09/19 1245 --   Oral Monitor Lying Right arm       Pain Score       02/09/19 1152       7        Wt Readings from Last 1 Encounters:   02/09/19 86 5 kg (190 lb 11 2 oz)           Pertinent Labs/Diagnostic Test Results:       Ref Range & Units 2/9/19 1227   WBC 4 31 - 10 16 Thousand/uL 16  71High     RBC 3 88 - 5 62 Million/uL 8  07High Hemoglobin 12 0 - 17 0 g/dL 15 4    Hematocrit 36 5 - 49 3 % 52  1High     MCV 82 - 98 fL 65Low     MCH 26 8 - 34 3 pg 19 1Low     MCHC 31 4 - 37 4 g/dL 29 6Low     RDW 11 6 - 15 1 % 18  6High     MPV 8 9 - 12 7 fL 8 9    Platelets 347 - 509 Thousands/uL 594High     nRBC /100 WBCs 0    Neutrophils Relative 43 - 75 % 76High     Immat GRANS % 0 - 2 % 0    Lymphocytes Relative 14 - 44 % 10Low     Monocytes Relative 4 - 12 % 12    Eosinophils Relative 0 - 6 % 1    Basophils Relative 0 - 1 % 1    Neutrophils Absolute 1 85 - 7 62 Thousands/µL 12  70High     Immature Grans Absolute 0 00 - 0 20 Thousand/uL 0 07    Lymphocytes Absolute 0 60 - 4 47 Thousands/µL 1 72    Monocytes Absolute 0 17 - 1 22 Thousand/µL 1  95High     Eosinophils Absolute 0 00 - 0 61 Thousand/µL 0 15    Basophils Absolute 0 00 - 0 10 Thousands/µL 0  12High                   Ref Range & Units 2/9/19 2321 2/9/19 1227   Sodium 136 - 145 mmol/L 130Low   130Low     Potassium 3 5 - 5 3 mmol/L 4 5  4 8    Chloride 100 - 108 mmol/L 98Low   95Low     CO2 21 - 32 mmol/L 27  25    ANION GAP 4 - 13 mmol/L 5  10    BUN 5 - 25 mg/dL 11  14    Creatinine 0 60 - 1 30 mg/dL 1  35High  CM 1  69High  CM   Glucose 65 - 140 mg/dL 94 CM 86 CM   Glucose, Fasting 65 - 99 mg/dL     Calcium 8 3 - 10 1 mg/dL 9 1  9 4    eGFR ml/min/1 73sq m 72  55                Cdiff  - pending    Ct A & P - 2/9 - Suspect enteritis   Distal bowel wall thickening      ED Treatment:    Date/Time Order Dose Route Action    02/09/2019 1227 sodium chloride 0 9 % bolus 1,000 mL 1,000 mL Intravenous New Bag    02/09/2019 1228 morphine (PF) 10 mg/mL injection 6 mg 6 mg Intravenous Given    02/09/2019 1228 ondansetron (ZOFRAN) injection 4 mg 4 mg Intravenous Given    02/09/2019 1228 pantoprazole (PROTONIX) injection 40 mg 40 mg Intravenous Given    02/09/2019 1317 iohexol (OMNIPAQUE) 350 MG/ML injection (MULTI-DOSE) 80 mL 80 mL Intravenous Given    02/09/2019 1334 HYDROmorphone (DILAUDID) injection 1 mg 1 mg Intravenous Given    02/09/2019 1417 cyclobenzaprine (FLEXERIL) tablet 10 mg 10 mg Oral Given    02/09/2019 1452 piperacillin-tazobactam (ZOSYN) IVPB 3 375 g 3 375 g Intravenous New Bag             Past Medical/Surgical History:   Diagnosis    Anxiety    Clostridium difficile colitis    Colitis    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[facility] )    Pancreatitis     Admitting Diagnosis: Abdominal pain [R10 9]  SARAHI (acute kidney injury) ([place] [address] ) [N17 9]  Ulcerative pancolitis ([place] [address] ) [K51 00]  Age/Sex: 20 y o  male     Assessment/Plan:   · Recurrent abdominal pain with a history of ulcerative colitis  ? CT abdomen pelvis showed distal bowel wall thickening and air-fluid levels related to enteritis  Similar findings have been noted on previous studies  ? Low suspicion for ulcerative colitis  Patient does not have bloody diarrhea  Appears to be his baseline chronic nonbloody diarrhea, could be a gastroenteritis as well  ? Check inflammatory markers  ? If continues to have diarrhea check stool studies  ? Clear liquid diet, advance as tolerated  ? Consult GI  · SARAHI  Likely pre renal given diarrhea and vomiting  ? IV fluid hydration  ? Serial labs to follow  · Hyponatremia  Likely hypovolemic in the setting of diarrhea and vomiting  ? IV fluid challenge  ? Serial labs to follow  · Leukocytosis  May be secondary to a gastroenteritis, also possibly a component of dehydration/hemoconcentration  Patient afebrile  ? Workup for abdominal pain as above  ? IV fluid challenge  ?  Serial labs to follow              Check a procalcitonin      Admission Orders:  Scheduled Meds:   Current Facility-Administered Medications:  acetaminophen 650 mg Oral Q6H PRN    cyclobenzaprine 10 mg Oral Q8H PRN 2/9 x 1    famotidine 20 mg Oral Daily    HYDROmorphone 0 2 mg Intravenous Q3H PRN    HYDROmorphone 0 5 mg Intravenous Q3H PRN    2/9 x 3    ondansetron 4 mg Intravenous Q6H PRN    saccharomyces boulardii 250 mg Oral BID    sertraline 50 mg Oral Daily Continuous Infusions:   sodium chloride 100 mL/hr      Nursing orders - VS q 4 - Up & OOB as tolerated -  scd's to le's -  Diet NPO    Gastroenterology  - pending

## 2019-02-10 NOTE — ASSESSMENT & PLAN NOTE
History of C diff  Stool C diff, stool culture pending, will follow up  Avoid antibiotic if possible

## 2019-02-10 NOTE — PLAN OF CARE
GASTROINTESTINAL - ADULT     Minimal or absence of nausea and/or vomiting Progressing     Maintains or returns to baseline bowel function Progressing     Maintains adequate nutritional intake Progressing        METABOLIC, FLUID AND ELECTROLYTES - ADULT     Electrolytes maintained within normal limits Progressing     Fluid balance maintained Progressing        Nutrition/Hydration-ADULT     Nutrient/Hydration intake appropriate for improving, restoring or maintaining nutritional needs Progressing

## 2019-02-10 NOTE — CONSULTS
Consultation - [facility]  Jeanine Saldana 20 y o  male MRN: [id number]  Unit/Bed#: 2 [place] [address] Encounter: [id number]        Consults    Reason for Consult / Principal Problem:  Abdominal pain, nausea, vomiting, diarrhea    ASSESSMENT and PLAN:    Principal Problem:    Abdominal pain with hx of Ulcerative Colitis  Active Problems:    Clostridium difficile colitis    CAR (generalized anxiety disorder)    Leukocytosis    Thrombocytosis ([facility] [address] )    Hyponatremia    SARAHI (acute kidney injury) ([facility] )    #1  Abdominal pain, nausea, vomiting, diarrhea in the setting of hx of ulcerative colitis s/p total colectomy with J pouch in 2015, now with J pouch stricture  Source of symptoms likely related to stricture  Had similar symptoms back in January when he was admitted here and suspected to have intermittent bowel obstruction from stricture  Symptoms at that time also resolved quickly  Patient is not on anything for his UC since his surgery  Some concern due to small bowel inflammation in both January and February on CT scan which could be consistent with Crohn's disease if he was misdiagnosed  Sed rate normal  CRP elevated  Symptoms improved today   -Discussed extensively with patient in regards to his complicated hx  He follows with a GI physician at [facility] group and a surgeon at [facility]  He has an appt with his GI doctor tomorrow and an appt with his surgeon on 2/25 which may be moved up  He needs EGD/colonoscopy and surgical repair of his stricture which is planned to be done through his GI/surgeon    -Since patient's symptoms are improved, we will trial a lo residue diet  If he tolerates, he can be d/c home with close follow up with his GI doctor tomorrow  Patient currently has no abdominal pain  -Will follow up C diff and stool PCR which are in process and call patient if there is any abnormalities  -------------------------------------------------------------------------------------------------------------------    HPI:  This is a 24-year-old male with a history of ulcerative colitis status post total colectomy with J pouch in 2015, now with J-pouch stricture and intermittent bowel obstruction who presented to the emergency room due to nausea, vomiting, abdominal pain, and worsening diarrhea  The patient follows with a GI physician at [facility] as well as a surgeon at [facility]  He reports for the last couple days he has been having worsening symptoms including vomiting and worsening abdominal pain  For the last several weeks he has had more frequent diarrhea compared to his normal   After his surgery he would typically go 6 to 8 times a day were over the last few weeks that has been closer to 10 to 12 times a day  He was seen back in January here for similar symptoms and was negative for C diff at that time  His symptoms at that time also resolved very quickly more thought to be possibly from intermittent bowel obstruction secondary to his J-pouch stricture  He is due to see his GI physician tomorrow and is surgeon on 02/25  He is due to have an upper endoscopy and colonoscopy as an outpatient and likely surgery for J-pouch stricture repair  Since coming to the hospital his abdominal pain has improved  He has had no further vomiting  He denies any blood in the stool  He denies any recent antibiotic use aside from when he was last admitted here in January  His last colonoscopy was about 2 years ago  REVIEW OF SYSTEMS:    CONSTITUTIONAL: Denies any fever, chills, or rigors  decreased appetite, and no recent weight loss  HEENT: No earache or tinnitus  Denies hearing loss or visual disturbances  CARDIOVASCULAR: No chest pain or palpitations  RESPIRATORY: Denies any cough, hemoptysis, shortness of breath or dyspnea on exertion    GASTROINTESTINAL: As noted in the History of Present Illness  GENITOURINARY: No problems with urination  Denies any hematuria or dysuria  NEUROLOGIC: No dizziness or vertigo, denies headaches  MUSCULOSKELETAL: Denies any muscle or joint pain  SKIN: Denies skin rashes or itching  ENDOCRINE: Denies excessive thirst  Denies intolerance to heat or cold  PSYCHOSOCIAL: Denies depression or anxiety  Denies any recent memory loss  Historical Information   Past Medical History:   Diagnosis Date    Anxiety     Clostridium difficile colitis 9/13/2018    Colitis     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[address] ) 9/13/2018    Pancreatitis      Past Surgical History:   Procedure Laterality Date    COLECTOMY TOTAL      with ileal pouch and anastemosis    TOTAL COLECTOMY       Social History   Social History     Substance and Sexual Activity   Alcohol Use Yes    Alcohol/week: 1 8 oz    Types: 3 Standard drinks or equivalent per week    Comment:  weekly     Social History     Substance and Sexual Activity   Drug Use No     Social History     Tobacco Use   Smoking Status Never Smoker   Smokeless Tobacco Never Used     History reviewed  No pertinent family history      Meds/Allergies     Medications Prior to Admission   Medication    acetaminophen-codeine (TYLENOL with CODEINE #4) 300-60 MG per tablet    psyllium (METAMUCIL) 58 6 % packet    sertraline (ZOLOFT) 50 mg tablet     Current Facility-Administered Medications   Medication Dose Route Frequency    acetaminophen (TYLENOL) tablet 650 mg  650 mg Oral Q6H PRN    cyclobenzaprine (FLEXERIL) tablet 10 mg  10 mg Oral Q8H PRN    famotidine (PEPCID) tablet 20 mg  20 mg Oral Daily    HYDROmorphone (DILAUDID) injection 0 2 mg  0 2 mg Intravenous Q3H PRN    HYDROmorphone (DILAUDID) injection 0 5 mg  0 5 mg Intravenous Q3H PRN    ondansetron (ZOFRAN) injection 4 mg  4 mg Intravenous Q6H PRN    saccharomyces boulardii (FLORASTOR) capsule 250 mg  250 mg Oral BID    sertraline (ZOLOFT) tablet 50 mg  50 mg Oral Daily    sodium chloride 0 9 % infusion  125 mL/hr Intravenous Continuous       Allergies   Allergen Reactions    Mesalamine GI Intolerance     Other reaction(s): Pancreatitis  [facility] - [id number]: pancreatitis           Objective     Blood pressure 118/78, pulse 84, temperature 97 9 °F (36 6 °C), temperature source Oral, resp  rate 16, height 5' 9" (1 753 m), weight 86 5 kg (190 lb 11 2 oz), SpO2 98 %  Intake/Output Summary (Last 24 hours) at 2/10/2019 1211  Last data filed at 2/10/2019 2883  Gross per 24 hour   Intake 1950 ml   Output --   Net 1950 ml         PHYSICAL EXAM:      General Appearance:   Alert, cooperative, no distress, appears stated age    HEENT:   Normocephalic, atraumatic, anicteric, no oropharyngeal thrush present      Neck:  Supple, symmetrical, trachea midline, no adenopathy;    thyroid: no enlargement/tenderness/nodules; no carotid  bruit or JVD    Lungs:   Clear to auscultation bilaterally; no rales, rhonchi or wheezing; respirations unlabored    Heart[de-identified]   S1 and S2 normal; regular rate and rhythm; no murmur, rub, or gallop     Abdomen:   Soft, non-tender, non-distended; normal bowel sounds; no masses, no organomegaly; surgical scar present from previous total colectomy    Genitalia:   Deferred    Rectal:   Deferred    Extremities:  No cyanosis, clubbing or edema    Pulses:  2+ and symmetric all extremities    Skin:  Skin color, texture, turgor normal, no rashes or lesions    Lymph nodes:  No palpable cervical, axillary or inguinal lymphadenopathy        Lab Results:   Results from last 7 days   Lab Units 02/10/19  0622   WBC Thousand/uL 8 25   HEMOGLOBIN g/dL 13 0   HEMATOCRIT % 44 2   PLATELETS Thousands/uL 425*   NEUTROS PCT % 56   LYMPHS PCT % 17   MONOS PCT % 20*   EOS PCT % 4     Results from last 7 days   Lab Units 02/10/19  0622  02/09/19  1227   POTASSIUM mmol/L 4 7   < > 4 8   CHLORIDE mmol/L 102   < > 95*   CO2 mmol/L 26   < > 25   BUN mg/dL 10   < > 14   CREATININE mg/dL 1 33*   < > 1 69* CALCIUM mg/dL 8 6   < > 9 4   ALK PHOS U/L  --   --  115   ALT U/L  --   --  50   AST U/L  --   --  36    < > = values in this interval not displayed  Results from last 7 days   Lab Units 02/09/19  1227   LIPASE u/L 90       Imaging Studies: I have personally reviewed pertinent imaging studies  Ct Abdomen Pelvis With Contrast    Result Date: 2/9/2019  Impression: Suspect enteritis  Workstation performed: [id number]           Patient was seen and examined by Dr Goldie Frank  All suazo medical decisions were made by Dr Goldie Frank  Thank you for allowing us to participate in the care of this present patient  We will follow-up with you closely

## 2019-02-10 NOTE — ASSESSMENT & PLAN NOTE
WBC 16 71  Normalized  Most likely secondary to gastroenteritis  Patient afebrile    Procalcitonin normal

## 2019-02-10 NOTE — DISCHARGE INSTRUCTIONS
Follow-up Primary GI and surgery as planned  Follow-up PCP in 1 week  Low residue diet  Stop taking Metamucil  Please discuss with GI/surgery about continuation of Metamucil

## 2019-02-10 NOTE — ASSESSMENT & PLAN NOTE
CT abdomen pelvis showed status post colectomy and J-pouch  Distal bowel wall thickening with air-fluid levels could relate to enteritis  History of UC,s/p total colectomy with J-pouch in 2015, known J pouch stricture  Low suspicion for ulcerative colitis exacerbation  Denies bloody diarrhea  Most likely gastroenteritis versus intermittent bowel obstruction from J-pouch stricture versus small-bowel inflammation  Patient was admitted here last month for abdominal pain/nausea/vomiting  Thought it was due to intermittent bowel obstruction from scar tissues per GI note  ESR normal, CRP 44 7  Stool C diff, stool culture pending  Symptoms improved significantly since admission with IV hydration, bowel rest, pain control  Patient has appointment made to see his primary GI tomorrow and his surgeon at [facility] on 2/25  Patient seen by GI  Started patient on low residue diet  Patient tolerated diet well  Patient can be discharged if tolerating diet per GI  Follow-up with primary GI tomorrow for EGD/colonoscopy  and surgeon at [facility] for surgical repair of J pouch stricture  Continue Tylenol with codeine # 4 on discharge  Continue low residue diet  Hold Metamucil until seen by GI tomorrow  Discharge patient on Pepcid for 2 weeks  Follow-up PCP in 1 week  Follow up primary GI, surgery as planned

## 2019-02-10 NOTE — UTILIZATION REVIEW
Continued Stay Review    Network Utilization Review Department  Phone: [phone]; Fax [phone]  [email or web address]  ATTENTION: Please call with any questions or concerns to [phone]  and carefully listen to the prompts so that you are directed to the right person  Send all requests for admission clinical reviews, approved or denied determinations and any other requests to fax [phone]   All voicemails are confidential     Date:2/10/2019    Vital Signs: /78 (BP Location: Right arm)   Pulse 84   Temp 97 9 °F (36 6 °C) (Oral)   Resp 16   Ht 5' 9" (1 753 m)   Wt 86 5 kg (190 lb 11 2 oz)   SpO2 98%   BMI 28 16 kg/m²      Assessment/Plan: 21 yo m presented to the Er  On 2/9 with c/o diffuse  abd ppain -  Hx of UC s/p surgery  In 2015 - seen recently for similar was for OP follow up with his surgeon schedules for 2/25    Medications:   Scheduled Meds:   Current Facility-Administered Medications:  acetaminophen 650 mg Oral Q6H PRN    cyclobenzaprine 10 mg Oral Q8H PRN    famotidine 20 mg Oral Daily    HYDROmorphone 0 2 mg Intravenous Q3H PRN    HYDROmorphone 0 5 mg Intravenous Q3H PRN 2/10 x 2     ondansetron 4 mg Intravenous Q6H PRN    saccharomyces boulardii 250 mg Oral BID    sertraline 50 mg Oral Daily      Continuous Infusions:   sodium chloride 100 mL/hr     Nursing orders - VS q 4 - Up & OOB as tolerated -  scd's to le's -  Diet NPO    Gastroenterology - pending    Pertinent Labs/Diagnostic Results:  2/10 - Na 132, Bun/cr 10/1 33, Wbc 8 25 - H/H 13 0/44 2      Age/Sex: 20 y o  male   Discharge Plan: TBD

## 2019-02-10 NOTE — ASSESSMENT & PLAN NOTE
· Most likely prerenal    · Creatinine 1 69->1 35->1 33 today  · Avoid nephrotoxins  · Repeat BMP in 3 days  · Follow-up PCP in 1 week

## 2019-02-10 NOTE — DISCHARGE SUMMARY
Discharge- Thuy Snyder 1995, 20 y o  male MRN: [id number]    Unit/Bed#: [address] Encounter: [id number]    Primary Care Provider: Taylor Lion DO   Date and time admitted to hospital: 2/9/2019 11:55 AM        * Abdominal pain with hx of Ulcerative Colitis  Assessment & Plan  CT abdomen pelvis showed status post colectomy and J-pouch  Distal bowel wall thickening with air-fluid levels could relate to enteritis  History of UC,s/p total colectomy with J-pouch in 2015, known J pouch stricture  Low suspicion for ulcerative colitis exacerbation  Denies bloody diarrhea  Most likely gastroenteritis versus intermittent bowel obstruction from J-pouch stricture versus small-bowel inflammation  Patient was admitted here last month for abdominal pain/nausea/vomiting  Thought it was due to intermittent bowel obstruction from scar tissues per GI note  ESR normal, CRP 44 7  Stool C diff, stool culture pending  Symptoms improved significantly since admission with IV hydration, bowel rest, pain control  Patient has appointment made to see his primary GI tomorrow and his surgeon at [facility] on 2/25  Patient seen by GI  Started patient on low residue diet  Patient tolerated diet well  Patient can be discharged if tolerating diet per GI  Follow-up with primary GI tomorrow for EGD/colonoscopy  and surgeon at [facility] for surgical repair of J pouch stricture  Continue Tylenol with codeine # 4 on discharge  Continue low residue diet  Hold Metamucil until seen by GI tomorrow  Discharge patient on Pepcid for 2 weeks  Follow-up PCP in 1 week  Follow up primary GI, surgery as planned  SARAHI (acute kidney injury) ([facility] [address] )  Assessment & Plan  · Most likely prerenal    · Creatinine 1 69->1 35->1 33 today  · Avoid nephrotoxins  · Repeat BMP in 3 days  · Follow-up PCP in 1 week  Hyponatremia  Assessment & Plan  Hypovolemic hyponatremia  Sodium 130->130->132  Repeat BMP in 3 days    Follow-up PCP in 1 week       Leukocytosis-resolved as of 2/10/2019  Assessment & Plan  WBC 16 71  Normalized  Most likely secondary to gastroenteritis  Patient afebrile  Procalcitonin normal         Thrombocytosis ([facility])  Assessment & Plan  Acute on chronic  Platelet 132->461  Likely secondary to hemoconcentration from dehydration  Repeat CBC in 3 days  Follow-up PCP in 1 week  CAR (generalized anxiety disorder)  Assessment & Plan  Continue Zoloft  Clostridium difficile colitis  Assessment & Plan  History of C diff  Stool C diff, stool culture pending, will follow up  Avoid antibiotic if possible            Discharging Physician / Practitioner: KY Saunders  PCP: Viral Beverly DO  Admission Date: 2/9/2019  Discharge Date: 02/10/19    Reason for Admission: Abdominal Pain (Patient complains of abd pain that started three days ago    )        Resolved Problems  Date Reviewed: 2/10/2019          Resolved    Leukocytosis 2/10/2019     Resolved by  Chris Jensen, [address] [place] [address] Stay:  IP CONSULT TO GASTROENTEROLOGY    Procedures Performed:     · None    Significant Findings / Test Results:     · As below  Results from last 7 days   Lab Units 02/10/19  0622 02/09/19  1227   WBC Thousand/uL 8 25 16 71*   HEMOGLOBIN g/dL 13 0 15 4   PLATELETS Thousands/uL 425* 594*     Results from last 7 days   Lab Units 02/10/19  0622 02/09/19 2321 02/09/19  1227   SODIUM mmol/L 132* 130* 130*   POTASSIUM mmol/L 4 7 4 5 4 8   CHLORIDE mmol/L 102 98* 95*   CO2 mmol/L 26 27 25   BUN mg/dL 10 11 14   CREATININE mg/dL 1 33* 1 35* 1 69*   CALCIUM mg/dL 8 6 9 1 9 4   TOTAL BILIRUBIN mg/dL  --   --  0 60   ALK PHOS U/L  --   --  115   ALT U/L  --   --  50   AST U/L  --   --  36             No results found for: HGBA1C      Results from last 7 days   Lab Units 02/09/19 2321 02/09/19  1302   LACTIC ACID mmol/L  --  1 4   PROCALCITONIN ng/ml 0 08  --      Blood Culture:   Lab Results   Component Value Date BLOODCX No Growth After 5 Days  01/05/2019    BLOODCX No Growth After 5 Days  01/05/2019     Urine Culture: No results found for: URINECX  Sputum Culture: No components found for: SPUTUMCX  Wound Culture: No results found for: WOUNDCULT     CT abdomen pelvis with contrast   Final Result by Yessy Cunha MD (02/09 8323)      Suspect enteritis  Workstation performed: [id number]                Incidental Findings:   · None     Test Results Pending at Discharge (will require follow up): · Stool C diff, stool culture     Outpatient Tests Requested:  · CBC, BMP in 3 days    Complications:  None    Reason for Admission:   Chief Complaint   Patient presents with    Abdominal Pain     Patient complains of abd pain that started three days ago  Hospital Course:     China Vázquez is a 20 y o  male patient with a PMH of ulcerative colitis, status post total colectomy and J-pouch, C diff colitis, pancreatitis, anxiety who originally presented to the hospital on 2/9/2019 due to abdominal pain, nausea, vomiting for a couple of days  Likely secondary to intermittent bowel obstruction from known J pouch stricture versus gastroenteritis versus small bowel inflammation  Blood work showed dehydration, acute kidney injury, hyponatremia  Symptoms improved rapidly with IV hydration, NPO, pain control, antiemetic p r n  Patient has an appointment with his GI tomorrow and has an appointment with his surgeon at [facility] on 2/25  Patient seen by GI here, recommended trial of low residue diet  Patient can be discharged if tolerating low residual diet  Follow-up with primary GI tomorrow as scheduled for EGD/colonoscopy and surgeon for surgical repair of J-pouch stricture  Patient is eager to go home and see his GI tomorrow  Will repeat CBC, BMP in 3 days  Continue Tylenol with codeine # 4 p r n  at home for pain  Advised patient to hold Metamucil for now till seen by his GI tomorrow  Continue low residue diet at home  We will follow up on stool studies and will call him if they are abnormal         Please see above list of diagnoses and related plan for additional information  Condition at Discharge: fair       Discharge Day Visit / Exam:     Subjective:  Patient reports feeling better  Reports some pain in epigastric region  Denies nausea, vomiting overnight  Denies diarrhea overnight  Denies chest pain, headaches, dizziness, SOB, fever or chills  Vitals: Blood Pressure: 130/68 (02/10/19 1319)  Pulse: 96 (02/10/19 1319)  Temperature: 97 9 °F (36 6 °C) (02/10/19 1319)  Temp Source: Oral (02/10/19 1319)  Respirations: 20 (02/10/19 1319)  Height: 5' 9" (175 3 cm) (02/09/19 1152)  Weight - Scale: 86 5 kg (190 lb 11 2 oz) (02/09/19 1152)  SpO2: 96 % (02/10/19 1319)  Exam:   Physical Exam   Constitutional: He is oriented to person, place, and time  He appears well-developed  HENT:   Head: Normocephalic and atraumatic  Neck: Normal range of motion  Neck supple  Cardiovascular: Normal rate and regular rhythm  Exam reveals no gallop and no friction rub  No murmur heard  Pulmonary/Chest: Breath sounds normal  No respiratory distress  He has no wheezes  He has no rales  Abdominal: Soft  Bowel sounds are normal  He exhibits no distension and no mass  There is tenderness  There is no rebound and no guarding  Mild tenderness in epigastric, old surgical scars in abdomen  Musculoskeletal: Normal range of motion  He exhibits no edema, tenderness or deformity  Neurological: He is alert and oriented to person, place, and time  Skin: Skin is warm and dry  Psychiatric: He has a normal mood and affect  Nursing note and vitals reviewed  Discharge instructions/Information to patient and family:   See after visit summary for information provided to patient and family  Provisions for Follow-Up Care:  See after visit summary for information related to follow-up care and any pertinent home health orders  Disposition:     Home    Planned Readmission:  No     Discharge Statement:  I spent 35 minutes discharging the patient  This time was spent on the day of discharge  I had direct contact with the patient on the day of discharge  Greater than 50% of the total time was spent examining patient, answering all patient questions, arranging and discussing plan of care with patient as well as directly providing post-discharge instructions  Additional time then spent on discharge activities  Discharge Medications:  See after visit summary for reconciled discharge medications provided to patient and family        ** Please Note: This note has been constructed using a voice recognition system **

## 2019-02-10 NOTE — ASSESSMENT & PLAN NOTE
Acute on chronic  Platelet 718->964  Likely secondary to hemoconcentration from dehydration  Repeat CBC in 3 days  Follow-up PCP in 1 week

## 2019-02-11 ENCOUNTER — TRANSITIONAL CARE MANAGEMENT (OUTPATIENT)
Dept: FAMILY MEDICINE CLINIC | Facility: CLINIC | Age: 24
End: 2019-02-11

## 2019-02-11 LAB
C DIFF TOX GENS STL QL NAA+PROBE: NORMAL
CAMPYLOBACTER DNA SPEC NAA+PROBE: NORMAL
MRSA NOSE QL CULT: NORMAL
SALMONELLA DNA SPEC QL NAA+PROBE: NORMAL
SHIGA TOXIN STX GENE SPEC NAA+PROBE: NORMAL
SHIGELLA DNA SPEC QL NAA+PROBE: NORMAL

## 2019-02-14 LAB
BACTERIA BLD CULT: NORMAL
BACTERIA BLD CULT: NORMAL

## 2019-02-15 DIAGNOSIS — F41.1 GAD (GENERALIZED ANXIETY DISORDER): ICD-10-CM

## 2019-02-15 NOTE — TELEPHONE ENCOUNTER
Kelley Cates calling for a refill for Zoloft   He said it has been working for him and would like a 90 day refill    [facility]     Thank you

## 2019-03-05 ENCOUNTER — TELEPHONE (OUTPATIENT)
Dept: FAMILY MEDICINE CLINIC | Facility: CLINIC | Age: 24
End: 2019-03-05

## 2019-03-05 NOTE — TELEPHONE ENCOUNTER
Called patient and lmom letting patient know how missed his appointment and to call office if he would like to reschedule

## 2019-04-09 ENCOUNTER — OFFICE VISIT (OUTPATIENT)
Dept: FAMILY MEDICINE CLINIC | Facility: CLINIC | Age: 24
End: 2019-04-09
Payer: COMMERCIAL

## 2019-04-09 VITALS
DIASTOLIC BLOOD PRESSURE: 78 MMHG | RESPIRATION RATE: 16 BRPM | HEART RATE: 88 BPM | TEMPERATURE: 98.2 F | BODY MASS INDEX: 27.25 KG/M2 | SYSTOLIC BLOOD PRESSURE: 120 MMHG | WEIGHT: 184 LBS | HEIGHT: 69 IN

## 2019-04-09 DIAGNOSIS — K91.850 ILEAL POUCHITIS (HCC): ICD-10-CM

## 2019-04-09 DIAGNOSIS — M94.0 ACUTE COSTOCHONDRITIS: Primary | ICD-10-CM

## 2019-04-09 DIAGNOSIS — F41.1 GAD (GENERALIZED ANXIETY DISORDER): ICD-10-CM

## 2019-04-09 DIAGNOSIS — F41.9 ANXIETY: ICD-10-CM

## 2019-04-09 DIAGNOSIS — K51.018 ULCERATIVE CHRONIC PANCOLITIS WITH OTHER COMPLICATION (HCC): ICD-10-CM

## 2019-04-09 PROBLEM — K91.858 COMPLICATIONS OF INTESTINAL POUCH (HCC): Status: ACTIVE | Noted: 2019-03-22

## 2019-04-09 PROBLEM — N17.9 AKI (ACUTE KIDNEY INJURY) (HCC): Status: RESOLVED | Noted: 2019-02-09 | Resolved: 2019-04-09

## 2019-04-09 PROBLEM — R10.9 ABDOMINAL PAIN: Status: RESOLVED | Noted: 2019-01-05 | Resolved: 2019-04-09

## 2019-04-09 PROBLEM — K51.00 ULCERATIVE PANCOLITIS (HCC): Status: RESOLVED | Noted: 2019-02-09 | Resolved: 2019-04-09

## 2019-04-09 PROBLEM — A04.72 CLOSTRIDIUM DIFFICILE COLITIS: Status: RESOLVED | Noted: 2018-09-13 | Resolved: 2019-04-09

## 2019-04-09 PROCEDURE — 1036F TOBACCO NON-USER: CPT | Performed by: FAMILY MEDICINE

## 2019-04-09 PROCEDURE — 99214 OFFICE O/P EST MOD 30 MIN: CPT | Performed by: FAMILY MEDICINE

## 2019-04-09 PROCEDURE — 3725F SCREEN DEPRESSION PERFORMED: CPT | Performed by: FAMILY MEDICINE

## 2019-04-09 PROCEDURE — 3008F BODY MASS INDEX DOCD: CPT | Performed by: FAMILY MEDICINE

## 2019-04-09 RX ORDER — METHYLPREDNISOLONE 4 MG/1
TABLET ORAL
Qty: 21 TABLET | Refills: 0 | Status: SHIPPED | OUTPATIENT
Start: 2019-04-09 | End: 2019-04-15

## 2019-06-19 NOTE — TELEPHONE ENCOUNTER
Increase back to 1.25 mg MWF, 2.5 mg rest of wk.    S/w pt  Surgeon not reachable, in [facility]  He lost about 10# in 5d, feeling distended  No fever or blood in stool  Reluctant to go to ER  Aware that pain meds may delay his medical care sabrina if gets fever, more distended, blood in stool, increasing pain and if his kidneys may be getting injured from dehydration  He accepts this  ER if worse understood

## 2019-07-03 ENCOUNTER — TELEPHONE (OUTPATIENT)
Dept: FAMILY MEDICINE CLINIC | Facility: CLINIC | Age: 24
End: 2019-07-03

## 2019-07-03 ENCOUNTER — OFFICE VISIT (OUTPATIENT)
Dept: URGENT CARE | Facility: CLINIC | Age: 24
End: 2019-07-03
Payer: COMMERCIAL

## 2019-07-03 ENCOUNTER — APPOINTMENT (OUTPATIENT)
Dept: RADIOLOGY | Facility: CLINIC | Age: 24
End: 2019-07-03
Payer: COMMERCIAL

## 2019-07-03 VITALS
DIASTOLIC BLOOD PRESSURE: 79 MMHG | RESPIRATION RATE: 16 BRPM | TEMPERATURE: 99.4 F | HEART RATE: 78 BPM | HEIGHT: 69 IN | WEIGHT: 193 LBS | SYSTOLIC BLOOD PRESSURE: 130 MMHG | BODY MASS INDEX: 28.58 KG/M2 | OXYGEN SATURATION: 97 %
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
## 2019-07-03 DIAGNOSIS — S20.211A CONTUSION OF RIB ON RIGHT SIDE, INITIAL ENCOUNTER: Primary | ICD-10-CM

## 2019-07-03 DIAGNOSIS — S29.9XXA CHEST WALL INJURY, INITIAL ENCOUNTER: ICD-10-CM

## 2019-07-03 PROCEDURE — 99213 OFFICE O/P EST LOW 20 MIN: CPT | Performed by: PHYSICIAN ASSISTANT

## 2019-07-03 PROCEDURE — 71101 X-RAY EXAM UNILAT RIBS/CHEST: CPT

## 2019-07-03 NOTE — PROGRESS NOTES
[facility]        NAME: Salena Marquez is a 30 y o  male  : 1995    MRN: [id number]  DATE: 2019  TIME: 10:16 AM    Assessment and Plan   Contusion of rib on right side, initial encounter [S20 211A]  1  Contusion of rib on right side, initial encounter  XR ribs right w pa chest min 3 views   2  Injury while playing softball  XR ribs right w pa chest min 3 views         Patient Instructions     Discussed condition with pt  XR of the right ribs shows non-displaced 6th rib fracture  I rec rest, ice, NSAIDs, and avoiding exertional activity  Follow up with PCP in 3-5 days  Proceed to  ER if symptoms worsen  Chief Complaint     Chief Complaint   Patient presents with    Rib Injury     running to [employer] ran into short stop and injured ribs on rt side and shouldeer on left  Ha d xray of shoulder by Chiropractor read as neg  Cannot lay on back pin incrased  History of Present Illness       Pt presents after sustaining recent injury to his right ribcage  He reports he was playing softball and was running [place] and he collided with the shortstop on his way to the bag and landed on his left shoulder but also with impact to the right anterior chest wall  He reports he saw his chiropractor who did X-ray on his left shoulder which was negative  He reports he has right anterior ribcage pain below the breast worsened with deep breathing, coughing, sneezing, laughing  Denies SOB  Review of Systems   Review of Systems   Constitutional: Negative  Respiratory: Negative  Cardiovascular: Negative  Gastrointestinal: Negative  Genitourinary: Negative      Musculoskeletal:        Right anterior ribcage pain S/P injury          Current Medications       Current Outpatient Medications:     omeprazole (PriLOSEC) 20 mg delayed release capsule, Take 20 mg by mouth, Disp: , Rfl:     sertraline (ZOLOFT) 50 mg tablet, Take 50 mg by mouth daily, Disp: , Rfl:     Current Allergies     Allergies as of 07/03/2019 - Reviewed 07/03/2019   Allergen Reaction Noted    Mesalamine GI Intolerance 10/06/2016            The following portions of the patient's history were reviewed and updated as appropriate: allergies, current medications, past family history, past medical history, past social history, past surgical history and problem list      Past Medical History:   Diagnosis Date    Anxiety     Clostridium difficile colitis 9/13/2018    Colitis     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[address] ) 9/13/2018    Pancreatitis        Past Surgical History:   Procedure Laterality Date    COLECTOMY TOTAL      with ileal pouch and anastemosis    TOTAL COLECTOMY         History reviewed  No pertinent family history  Medications have been verified  Objective   /79   Pulse 78   Temp 99 4 °F (37 4 °C)   Resp 16   Ht 5' 9" (1 753 m)   Wt 87 5 kg (193 lb)   SpO2 97%   BMI 28 50 kg/m²        Physical Exam     Physical Exam   Constitutional: He is oriented to person, place, and time  He appears well-developed and well-nourished  No distress  Cardiovascular: Normal rate, regular rhythm and normal heart sounds  No murmur heard  Pulmonary/Chest: Effort normal and breath sounds normal    Musculoskeletal:   TTP over the right anterolateral ribcage just below the right nipple  No visible STS, ecchymosis, or other visible/palpable deformity noted  Neurological: He is alert and oriented to person, place, and time  Psychiatric: He has a normal mood and affect  Vitals reviewed

## 2019-07-03 NOTE — TELEPHONE ENCOUNTER
Pt called stating he was having pain and difficulty moving and sleeping, he thinks he cracked his ribs  Pt asked for an rx for an x-ray  Advised pt to go to [facility] as they do x-rays right in office    Forward to dr Miguel Angel Handley lpn

## 2019-07-29 DIAGNOSIS — F41.1 GAD (GENERALIZED ANXIETY DISORDER): ICD-10-CM

## 2019-10-04 ENCOUNTER — APPOINTMENT (EMERGENCY)
Dept: RADIOLOGY | Facility: HOSPITAL | Age: 24
End: 2019-10-04
Payer: COMMERCIAL

## 2019-10-04 ENCOUNTER — HOSPITAL ENCOUNTER (EMERGENCY)
Facility: HOSPITAL | Age: 24
Discharge: HOME | End: 2019-10-05
Attending: EMERGENCY MEDICINE | Admitting: EMERGENCY MEDICINE
Payer: COMMERCIAL

## 2019-10-04 DIAGNOSIS — R10.9 ABDOMINAL PAIN: ICD-10-CM

## 2019-10-04 DIAGNOSIS — K52.9 ENTERITIS: Primary | ICD-10-CM

## 2019-10-04 LAB
ALBUMIN SERPL BCP-MCNC: 4.1 G/DL (ref 3.5–5)
ALP SERPL-CCNC: 92 U/L (ref 46–116)
ALT SERPL W P-5'-P-CCNC: 45 U/L (ref 12–78)
ANION GAP SERPL CALCULATED.3IONS-SCNC: 9 MMOL/L (ref 4–13)
APTT PPP: 27 SECONDS (ref 23–37)
AST SERPL W P-5'-P-CCNC: 46 U/L (ref 5–45)
BASOPHILS # BLD AUTO: 0.12 THOUSANDS/ΜL (ref 0–0.1)
BASOPHILS NFR BLD AUTO: 1 % (ref 0–1)
BILIRUB SERPL-MCNC: 0.5 MG/DL (ref 0.2–1)
BILIRUB UR QL STRIP: NEGATIVE
BUN SERPL-MCNC: 12 MG/DL (ref 5–25)
CALCIUM SERPL-MCNC: 8.6 MG/DL (ref 8.3–10.1)
CHLORIDE SERPL-SCNC: 103 MMOL/L (ref 100–108)
CLARITY UR: CLEAR
CO2 SERPL-SCNC: 26 MMOL/L (ref 21–32)
COLOR UR: YELLOW
CREAT SERPL-MCNC: 1.17 MG/DL (ref 0.6–1.3)
EOSINOPHIL # BLD AUTO: 0.25 THOUSAND/ΜL (ref 0–0.61)
EOSINOPHIL NFR BLD AUTO: 2 % (ref 0–6)
ERYTHROCYTE [DISTWIDTH] IN BLOOD BY AUTOMATED COUNT: 17.7 % (ref 11.6–15.1)
GFR SERPL CREATININE-BSD FRML MDRD: 86 ML/MIN/1.73SQ M
GLUCOSE SERPL-MCNC: 115 MG/DL (ref 65–140)
GLUCOSE UR STRIP-MCNC: NEGATIVE MG/DL
HCT VFR BLD AUTO: 41.2 % (ref 36.5–49.3)
HGB BLD-MCNC: 12.2 G/DL (ref 12–17)
HGB UR QL STRIP.AUTO: NEGATIVE
IMM GRANULOCYTES # BLD AUTO: 0.04 THOUSAND/UL (ref 0–0.2)
IMM GRANULOCYTES NFR BLD AUTO: 0 % (ref 0–2)
INR PPP: 0.92 (ref 0.91–1.09)
KETONES UR STRIP-MCNC: NEGATIVE MG/DL
LACTATE SERPL-SCNC: 0.7 MMOL/L (ref 0.5–2)
LACTATE SERPL-SCNC: 2.4 MMOL/L (ref 0.5–2)
LEUKOCYTE ESTERASE UR QL STRIP: NEGATIVE
LIPASE SERPL-CCNC: 126 U/L (ref 73–393)
LYMPHOCYTES # BLD AUTO: 2.34 THOUSANDS/ΜL (ref 0.6–4.47)
LYMPHOCYTES NFR BLD AUTO: 20 % (ref 14–44)
MAGNESIUM SERPL-MCNC: 1.9 MG/DL (ref 1.6–2.6)
MCH RBC QN AUTO: 18.9 PG (ref 26.8–34.3)
MCHC RBC AUTO-ENTMCNC: 29.6 G/DL (ref 31.4–37.4)
MCV RBC AUTO: 64 FL (ref 82–98)
MONOCYTES # BLD AUTO: 0.95 THOUSAND/ΜL (ref 0.17–1.22)
MONOCYTES NFR BLD AUTO: 8 % (ref 4–12)
NEUTROPHILS # BLD AUTO: 8.16 THOUSANDS/ΜL (ref 1.85–7.62)
NEUTS SEG NFR BLD AUTO: 69 % (ref 43–75)
NITRITE UR QL STRIP: NEGATIVE
NRBC BLD AUTO-RTO: 0 /100 WBCS
PH UR STRIP.AUTO: 5.5 [PH]
PHOSPHATE SERPL-MCNC: 3 MG/DL (ref 2.7–4.5)
PLATELET # BLD AUTO: 431 THOUSANDS/UL (ref 149–390)
PMV BLD AUTO: 8.9 FL (ref 8.9–12.7)
POTASSIUM SERPL-SCNC: 5 MMOL/L (ref 3.5–5.3)
PROT SERPL-MCNC: 7.9 G/DL (ref 6.4–8.2)
PROT UR STRIP-MCNC: NEGATIVE MG/DL
PROTHROMBIN TIME: 10 SECONDS (ref 9.8–12)
RBC # BLD AUTO: 6.47 MILLION/UL (ref 3.88–5.62)
SODIUM SERPL-SCNC: 138 MMOL/L (ref 136–145)
SP GR UR STRIP.AUTO: 1.01 (ref 1–1.03)
TROPONIN I SERPL-MCNC: <0.02 NG/ML
UROBILINOGEN UR QL STRIP.AUTO: 0.2 E.U./DL
WBC # BLD AUTO: 11.86 THOUSAND/UL (ref 4.31–10.16)

## 2019-10-04 PROCEDURE — 83690 ASSAY OF LIPASE: CPT

## 2019-10-04 PROCEDURE — 85025 COMPLETE CBC W/AUTO DIFF WBC: CPT

## 2019-10-04 PROCEDURE — 83735 ASSAY OF MAGNESIUM: CPT | Performed by: EMERGENCY MEDICINE

## 2019-10-04 PROCEDURE — 80053 COMPREHEN METABOLIC PANEL: CPT

## 2019-10-04 PROCEDURE — 71045 X-RAY EXAM CHEST 1 VIEW: CPT

## 2019-10-04 PROCEDURE — 84484 ASSAY OF TROPONIN QUANT: CPT | Performed by: EMERGENCY MEDICINE

## 2019-10-04 PROCEDURE — 84100 ASSAY OF PHOSPHORUS: CPT | Performed by: EMERGENCY MEDICINE

## 2019-10-04 PROCEDURE — 99285 EMERGENCY DEPT VISIT HI MDM: CPT

## 2019-10-04 PROCEDURE — 93005 ELECTROCARDIOGRAM TRACING: CPT

## 2019-10-04 PROCEDURE — 87040 BLOOD CULTURE FOR BACTERIA: CPT | Performed by: EMERGENCY MEDICINE

## 2019-10-04 PROCEDURE — 36415 COLL VENOUS BLD VENIPUNCTURE: CPT | Performed by: EMERGENCY MEDICINE

## 2019-10-04 PROCEDURE — 96361 HYDRATE IV INFUSION ADD-ON: CPT

## 2019-10-04 PROCEDURE — 74177 CT ABD & PELVIS W/CONTRAST: CPT

## 2019-10-04 PROCEDURE — 96376 TX/PRO/DX INJ SAME DRUG ADON: CPT

## 2019-10-04 PROCEDURE — 81003 URINALYSIS AUTO W/O SCOPE: CPT | Performed by: EMERGENCY MEDICINE

## 2019-10-04 PROCEDURE — 85610 PROTHROMBIN TIME: CPT | Performed by: EMERGENCY MEDICINE

## 2019-10-04 PROCEDURE — 85730 THROMBOPLASTIN TIME PARTIAL: CPT | Performed by: EMERGENCY MEDICINE

## 2019-10-04 PROCEDURE — 96365 THER/PROPH/DIAG IV INF INIT: CPT

## 2019-10-04 PROCEDURE — 96375 TX/PRO/DX INJ NEW DRUG ADDON: CPT

## 2019-10-04 PROCEDURE — 83605 ASSAY OF LACTIC ACID: CPT | Performed by: EMERGENCY MEDICINE

## 2019-10-04 RX ORDER — ONDANSETRON 2 MG/ML
4 INJECTION INTRAMUSCULAR; INTRAVENOUS ONCE
Status: COMPLETED | OUTPATIENT
Start: 2019-10-04 | End: 2019-10-04

## 2019-10-04 RX ORDER — HYDROMORPHONE HCL/PF 1 MG/ML
1 SYRINGE (ML) INJECTION ONCE
Status: COMPLETED | OUTPATIENT
Start: 2019-10-04 | End: 2019-10-04

## 2019-10-04 RX ORDER — FENTANYL CITRATE 50 UG/ML
50 INJECTION, SOLUTION INTRAMUSCULAR; INTRAVENOUS ONCE
Status: COMPLETED | OUTPATIENT
Start: 2019-10-04 | End: 2019-10-04

## 2019-10-04 RX ADMIN — ONDANSETRON 4 MG: 2 INJECTION INTRAMUSCULAR; INTRAVENOUS at 20:55

## 2019-10-04 RX ADMIN — SODIUM CHLORIDE 1000 ML: 0.9 INJECTION, SOLUTION INTRAVENOUS at 20:47

## 2019-10-04 RX ADMIN — HYDROMORPHONE HYDROCHLORIDE 1 MG: 1 INJECTION, SOLUTION INTRAMUSCULAR; INTRAVENOUS; SUBCUTANEOUS at 21:41

## 2019-10-04 RX ADMIN — HYDROMORPHONE HYDROCHLORIDE 1 MG: 1 INJECTION, SOLUTION INTRAMUSCULAR; INTRAVENOUS; SUBCUTANEOUS at 21:51

## 2019-10-04 RX ADMIN — HYDROMORPHONE HYDROCHLORIDE 1 MG: 1 INJECTION, SOLUTION INTRAMUSCULAR; INTRAVENOUS; SUBCUTANEOUS at 20:52

## 2019-10-04 RX ADMIN — SODIUM CHLORIDE 1000 ML: 0.9 INJECTION, SOLUTION INTRAVENOUS at 22:14

## 2019-10-04 RX ADMIN — PIPERACILLIN SODIUM,TAZOBACTAM SODIUM 3.38 G: 3; .375 INJECTION, POWDER, FOR SOLUTION INTRAVENOUS at 22:05

## 2019-10-04 RX ADMIN — IOHEXOL 100 ML: 350 INJECTION, SOLUTION INTRAVENOUS at 21:59

## 2019-10-04 RX ADMIN — ONDANSETRON 4 MG: 2 INJECTION INTRAMUSCULAR; INTRAVENOUS at 20:53

## 2019-10-04 RX ADMIN — FENTANYL CITRATE 50 MCG: 50 INJECTION INTRAMUSCULAR; INTRAVENOUS at 22:12

## 2019-10-05 VITALS
SYSTOLIC BLOOD PRESSURE: 130 MMHG | BODY MASS INDEX: 26.66 KG/M2 | WEIGHT: 180 LBS | DIASTOLIC BLOOD PRESSURE: 82 MMHG | OXYGEN SATURATION: 98 % | HEIGHT: 69 IN | RESPIRATION RATE: 16 BRPM | HEART RATE: 72 BPM | TEMPERATURE: 99.5 F

## 2019-10-05 LAB
ATRIAL RATE: 93 BPM
P AXIS: 60 DEGREES
PR INTERVAL: 166 MS
QRS AXIS: 7 DEGREES
QRSD INTERVAL: 86 MS
QT INTERVAL: 354 MS
QTC INTERVAL: 440 MS
T WAVE AXIS: 16 DEGREES
VENTRICULAR RATE: 93 BPM

## 2019-10-05 PROCEDURE — 93010 ELECTROCARDIOGRAM REPORT: CPT | Performed by: INTERNAL MEDICINE

## 2019-10-05 NOTE — SEPSIS NOTE
Sepsis Note   Kirstie Newell 30 y o  male MRN: [id number]  Unit/Bed#: ED 10 Encounter: [id number]      qSOFA     Row Name 10/04/19 2130 10/04/19 2036             Altered mental status GCS < 15           Respiratory Rate > / =22  0  1       Systolic BP < / =034  0  0       Q Sofa Score  0  1           Initial Sepsis Screening     Row Name 10/04/19 2130                Is the patient's history suggestive of a new or worsening infection?         Suspected source of infection  acute abdominal infection  -KF        Are two or more of the following signs & symptoms of infection both present and new to the patient?         Indicate SIRS criteria  Tachycardia > 90 bpm;Leukocytosis (WBC > 81077 IJL)  -KF        If the answer is yes to both questions, suspicion of sepsis is present          If severe sepsis is present AND tissue hypoperfusion perists in the hour after fluid resuscitation or lactate > 4, the patient meets criteria for SEPTIC SHOCK          Are any of the following organ dysfunction criteria present within 6 hours of suspected infection and SIRS criteria that are NOT considered to be chronic conditions? (!) Yes  -KF        Organ dysfunction  Lactate > 2 0 mmol/L  -KF        Date of presentation of severe sepsis          Time of presentation of severe sepsis          Tissue hypoperfusion persists in the hour after crystalloid fluid administration, evidenced, by either:          Was hypotension present within one hour of the conclusion of crystalloid fluid administration?           Date of presentation of septic shock          Time of presentation of septic shock            User Key  (r) = Recorded By, (t) = Taken By, (c) = Cosigned By    Initials Name Provider Type    [address], DO Physician

## 2019-10-05 NOTE — ED NOTES
Pt is seen standing in room c/o of extreme pain on L upper ab + ribs  Pt unable to sit on stretcher and get any relief  Dr Elda Kulkarni made aware  Advised to d/c PO contrast and go straight to CT  CT called  More pain medication ordered  Dr Elda Kulkarni in room to speak w/ pt + family       Luzma Millan RN  10/04/19 2648

## 2019-10-05 NOTE — ED PROVIDER NOTES
History  Chief Complaint   Patient presents with    Abdominal Pain     pt reports sudden onset of a traumatic  LUQ abd pain, left rib pain  hx of hemicoloectomy 2015  "i feels like its in my ribs" denies n/v/ skin warm pink and dry resp shallow unlabored  49-year-old male with past medical history of ulcerative colitis status post hemicolectomy, pancreatitis, multiple episodes of SBO, presents to the ER with complaint of acute onset left upper quadrant abdominal pain that started at rest about half hour prior to arrival   Patient appears to be in moderate distress secondary to pain in the ED  Patient also complains of nausea but denies vomiting  Patient denies any fevers or chills  History provided by:  Patient  Abdominal Pain   Associated symptoms: nausea    Associated symptoms: no chest pain, no cough, no diarrhea, no dysuria, no fatigue, no fever, no shortness of breath, no sore throat and no vomiting        Prior to Admission Medications   Prescriptions Last Dose Informant Patient Reported? Taking?   omeprazole (PriLOSEC) 20 mg delayed release capsule   Yes No   Sig: Take 20 mg by mouth   sertraline (ZOLOFT) 50 mg tablet   No No   Sig: TAKE 1 TABLET (50 MG TOTAL) BY MOUTH DAILY      Facility-Administered Medications: None       Past Medical History:   Diagnosis Date    Anxiety     Bowel obstruction (HCC)     Clostridium difficile colitis 9/13/2018    Colitis     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[facility] [address] ) 9/13/2018    Pancreatitis        Past Surgical History:   Procedure Laterality Date    COLECTOMY TOTAL      with ileal pouch and anastemosis    TOTAL COLECTOMY         History reviewed  No pertinent family history  I have reviewed and agree with the history as documented  Social History     Tobacco Use    Smoking status: Never Smoker    Smokeless tobacco: Never Used   Substance Use Topics    Alcohol use:  Yes     Alcohol/week: 3 0 standard drinks     Types: 3 Standard drinks or equivalent per week Comment:  weekly    Drug use: No        Review of Systems   Constitutional: Negative for activity change, fatigue and fever  HENT: Negative for congestion, ear discharge and sore throat  Eyes: Negative for pain and redness  Respiratory: Negative for cough, chest tightness, shortness of breath and wheezing  Cardiovascular: Negative for chest pain  Gastrointestinal: Positive for abdominal pain and nausea  Negative for diarrhea and vomiting  Endocrine: Negative for cold intolerance  Genitourinary: Negative for dysuria and urgency  Musculoskeletal: Negative for arthralgias and back pain  Neurological: Negative for dizziness, weakness and headaches  Psychiatric/Behavioral: Negative for agitation and behavioral problems  Physical Exam  Physical Exam   Constitutional: He is oriented to person, place, and time  He appears well-developed and well-nourished  HENT:   Head: Normocephalic and atraumatic  Nose: Nose normal    Mouth/Throat: Oropharynx is clear and moist    Eyes: Conjunctivae and EOM are normal    Neck: Normal range of motion  Neck supple  Cardiovascular: Normal rate, regular rhythm and normal heart sounds  Pulmonary/Chest: Effort normal and breath sounds normal    Abdominal: Soft  Bowel sounds are normal  He exhibits no distension  There is no tenderness  Musculoskeletal: Normal range of motion  Neurological: He is alert and oriented to person, place, and time  Skin: Skin is warm  Psychiatric: He has a normal mood and affect  His behavior is normal  Judgment and thought content normal    Nursing note and vitals reviewed        Vital Signs  ED Triage Vitals [10/04/19 2036]   Temperature Pulse Respirations Blood Pressure SpO2   99 5 °F (37 5 °C) (!) 119 (!) 28 163/95 97 %      Temp Source Heart Rate Source Patient Position - Orthostatic VS BP Location FiO2 (%)   Tympanic Monitor Sitting Right arm --      Pain Score       9           Vitals:    10/04/19 2036 10/04/19 2130 BP: 163/95 127/75   Pulse: (!) 119 72   Patient Position - Orthostatic VS: Sitting          Visual Acuity      ED Medications  Medications   sodium chloride 0 9 % bolus 1,000 mL (1,000 mL Intravenous New Bag 10/4/19 2047)   piperacillin-tazobactam (ZOSYN) IVPB 3 375 g (has no administration in time range)   HYDROmorphone (DILAUDID) injection 1 mg (1 mg Intravenous Given 10/4/19 2052)   ondansetron (ZOFRAN) injection 4 mg (4 mg Intravenous Given 10/4/19 2055)   ondansetron (ZOFRAN) injection 4 mg (4 mg Intravenous Given 10/4/19 2053)   HYDROmorphone (DILAUDID) injection 1 mg (1 mg Intravenous Given 10/4/19 2141)       Diagnostic Studies  Results Reviewed     Procedure Component Value Units Date/Time    Blood culture [[id number]] Collected:  10/04/19 2142    Lab Status:  No result Specimen:  Blood from Arm, Left     Lactic acid, plasma x2 [[id number]]  (Abnormal) Collected:  10/04/19 2055    Lab Status:  Final result Specimen:  Blood from Arm, Left Updated:  10/04/19 2127     LACTIC ACID 2 4 mmol/L     Narrative:       Result may be elevated if tourniquet was used during collection      Protime-INR [[id number]]  (Normal) Collected:  10/04/19 2055    Lab Status:  Final result Specimen:  Blood from Arm, Left Updated:  10/04/19 2124     Protime 10 0 seconds      INR 0 92    APTT [[id number]]  (Normal) Collected:  10/04/19 2055    Lab Status:  Final result Specimen:  Blood from Arm, Left Updated:  10/04/19 2124     PTT 27 seconds     Comprehensive metabolic panel [[id number]]  (Abnormal) Collected:  10/04/19 2055    Lab Status:  Final result Specimen:  Blood from Arm, Left Updated:  10/04/19 2118     Sodium 138 mmol/L      Potassium 5 0 mmol/L      Chloride 103 mmol/L      CO2 26 mmol/L      ANION GAP 9 mmol/L      BUN 12 mg/dL      Creatinine 1 17 mg/dL      Glucose 115 mg/dL      Calcium 8 6 mg/dL      AST 46 U/L      ALT 45 U/L      Alkaline Phosphatase 92 U/L      Total Protein 7 9 g/dL      Albumin 4 1 g/dL      Total Bilirubin 0 50 mg/dL      eGFR 86 ml/min/1 73sq m     Narrative:       [place] guidelines for Chronic Kidney Disease (CKD):     Stage 1 with normal or high GFR (GFR > 90 mL/min/1 73 square meters)    Stage 2 Mild CKD (GFR = 60-89 mL/min/1 73 square meters)    Stage 3A Moderate CKD (GFR = 45-59 mL/min/1 73 square meters)    Stage 3B Moderate CKD (GFR = 30-44 mL/min/1 73 square meters)    Stage 4 Severe CKD (GFR = 15-29 mL/min/1 73 square meters)    Stage 5 End Stage CKD (GFR <15 mL/min/1 73 square meters)  Note: GFR calculation is accurate only with a steady state creatinine    Lipase [[id number]]  (Normal) Collected:  10/04/19 2055    Lab Status:  Final result Specimen:  Blood from Arm, Left Updated:  10/04/19 2118     Lipase 126 u/L     Magnesium [[id number]]  (Normal) Collected:  10/04/19 2055    Lab Status:  Final result Specimen:  Blood from Arm, Left Updated:  10/04/19 2118     Magnesium 1 9 mg/dL     Phosphorus [[id number]]  (Normal) Collected:  10/04/19 2055    Lab Status:  Final result Specimen:  Blood from Arm, Left Updated:  10/04/19 2118     Phosphorus 3 0 mg/dL     CBC and differential [[id number]]  (Abnormal) Collected:  10/04/19 2055    Lab Status:  Final result Specimen:  Blood from Arm, Left Updated:  10/04/19 2103     WBC 11 86 Thousand/uL      RBC 6 47 Million/uL      Hemoglobin 12 2 g/dL      Hematocrit 41 2 %      MCV 64 fL      MCH 18 9 pg      MCHC 29 6 g/dL      RDW 17 7 %      MPV 8 9 fL      Platelets 297 Thousands/uL      nRBC 0 /100 WBCs      Neutrophils Relative 69 %      Immat GRANS % 0 %      Lymphocytes Relative 20 %      Monocytes Relative 8 %      Eosinophils Relative 2 %      Basophils Relative 1 %      Neutrophils Absolute 8 16 Thousands/µL      Immature Grans Absolute 0 04 Thousand/uL      Lymphocytes Absolute 2 34 Thousands/µL      Monocytes Absolute 0 95 Thousand/µL      Eosinophils Absolute 0 25 Thousand/µL      Basophils Absolute 0 12 Thousands/µL Blood culture #1 [[id number]] Collected:  10/04/19 2055    Lab Status: In process Specimen:  Blood from Arm, Left Updated:  10/04/19 2101    Lactic acid, plasma x2 [[id number]]     Lab Status:  No result Specimen:  Blood     UA w Reflex to Microscopic w Reflex to Culture [[id number]]     Lab Status:  No result Specimen:  Urine                  CT abdomen pelvis with contrast    (Results Pending)   XR chest portable    (Results Pending)              Procedures  ECG 12 Lead Documentation Only  Date/Time: 10/4/2019 8:51 PM  Performed by: Kole Lund DO  Authorized by: Kole Lund DO     Indications / Diagnosis:  Pain  ECG reviewed by me, the ED Provider: yes    Patient location:  ED  Previous ECG:     Previous ECG:  Compared to current    Similarity:  Changes noted    Comparison to cardiac monitor: Yes    Interpretation:     Interpretation: normal    Comments:      Sinus rhythm, rate 93, normal axis, normal intervals, no acute ST/T-wave abnormalities noted, previous EKG showed sinus tachycardia that has since resolved  ED Course                               MDM  Number of Diagnoses or Management Options  Abdominal pain:   Enteritis:   Diagnosis management comments: Obtain blood work, obstruction series, CT abdomen/pelvis  Give IV fluids, pain medication, antiemetics and reassess symptoms       Amount and/or Complexity of Data Reviewed  Clinical lab tests: ordered and reviewed  Tests in the radiology section of CPT®: ordered and reviewed  Tests in the medicine section of CPT®: ordered and reviewed  Review and summarize past medical records: yes  Independent visualization of images, tracings, or specimens: yes    Risk of Complications, Morbidity, and/or Mortality  General comments: CBC shows mild elevation white count  Lactic acidosis is also noted  At this time patient meets SIRS criteria and sepsis alert was initiated  Empiric antibiotics given in the ED  Patient is currently awaiting CT scan  Care patient signed out to the next attending will review CT results and dispo patient appropriately  Patient's pain is currently controlled with IV narcotics  Patient Progress  Patient progress: stable      Disposition  Final diagnoses:   None     ED Disposition     None      Follow-up Information    None         Patient's Medications   Discharge Prescriptions    No medications on file     No discharge procedures on file      ED Provider  Electronically Signed by           Sabi Thomas DO  10/10/19 [facility]

## 2019-10-05 NOTE — ED NOTES
Dr Natali Wallis at bedside discussing plan of care with pt and family      Naeem Ahmadi, ZACHARY  10/04/19 5474

## 2019-10-05 NOTE — ED CARE HANDOFF
Emergency Department Sign Out Note        Sign out and transfer of care from Dr Zee Wilson  See Separate Emergency Department note  The patient, Melissa Diaz, was evaluated by the previous provider for abdominal pain  Workup Completed:  Labs, pain management    ED Course / Workup Pending (followup):  CT report    Reviewed CT report with patient and parents  Pt is pain free  I offered him an admission due to severity of symptoms  Pt requested some time to discuss with his family  Pt refuses admission and states that he would rather go home  Pt given return precautions  Procedures  MDM    Disposition  Final diagnoses:   Enteritis   Abdominal pain     Time reflects when diagnosis was documented in both MDM as applicable and the Disposition within this note     Time User Action Codes Description Comment    10/4/2019 11:54 PM Mel Mabry Add [K52 9] Enteritis     10/4/2019 11:54 PM Mel Mabry Add [R10 9] Abdominal pain       ED Disposition     ED Disposition Condition Date/Time Comment    Discharge Stable Fri Oct 4, 2019 11:54 PM Melissa Diaz discharge to home/self care  Follow-up Information     Follow up With Specialties Details Why [facility],  Family Medicine Schedule an appointment as soon as possible for a visit in 2 days for follow up [address] [place] [address]  [phone]          Discharge Medication List as of 10/4/2019 11:55 PM      CONTINUE these medications which have NOT CHANGED    Details   omeprazole (PriLOSEC) 20 mg delayed release capsule Take 20 mg by mouth, Historical Med      sertraline (ZOLOFT) 50 mg tablet TAKE 1 TABLET (50 MG TOTAL) BY MOUTH DAILY, Starting Mon 7/29/2019, Normal           No discharge procedures on file         ED Provider  Electronically Signed by     Wilma Vegas DO  10/06/19 2578

## 2019-10-07 ENCOUNTER — VBI (OUTPATIENT)
Dept: FAMILY MEDICINE CLINIC | Facility: CLINIC | Age: 24
End: 2019-10-07

## 2019-10-07 NOTE — TELEPHONE ENCOUNTER
Deana Anderson    ED Visit Information     Ed visit date: 10/04/2019  Diagnosis Description: Enteritis; Abdominal pain  In Network? Yes Abhishek Bal  Discharge status: Home  Discharged with meds ? No  Number of ED visits to date: 1  ED Severity:NA     Outreach Information    Outreach successful: Yes 2  Date letter mailed:10/08/2019  Date Finalized:10/08/2019    Care Coordination    Follow up appointment with pcp: no no  Transportation issues ?  NA    Value Base Outreach    10/07/2019 3:14 pm - [facility]  10/08/2019 11:36 am

## 2019-10-07 NOTE — LETTER
[facility]  [address] [place] [address], [address]    10/8/2019        Gosia Luevano  [address] [address]    We have been notified of your Emergency Room visit to [address] on 10/04/2019_  In an attempt to speak to you regarding this ER visit, we called [phone] and were unable to reach you personally  If this phone number is incorrect, please notify our staff to update your chart  As part of our continuing commitment to caring for our patients, [facility] has extended office hours to meet your medical needs  Office hours are:    Monday  Thursday 8:00 a m  until 8:00 p m  Friday -  8:00 a m  to  5:00 p m  Saturday  - 8:30 a m  to 1:00 p m   *On-Call Physicians available after hours [facility] is your [address] and we welcome the opportunity to participate in your healthcare  Should you require an appointment in connection to your recent ER visit, please contact our office immediately        Thank you,        [facility]  [phone]

## 2019-10-10 LAB
BACTERIA BLD CULT: NORMAL
BACTERIA BLD CULT: NORMAL

## 2019-11-15 ENCOUNTER — HOSPITAL ENCOUNTER (EMERGENCY)
Facility: HOSPITAL | Age: 24
Discharge: HOME | End: 2019-11-15
Attending: EMERGENCY MEDICINE
Payer: OTHER MISCELLANEOUS

## 2019-11-15 ENCOUNTER — APPOINTMENT (EMERGENCY)
Dept: RADIOLOGY | Facility: HOSPITAL | Age: 24
End: 2019-11-15
Payer: OTHER MISCELLANEOUS

## 2019-11-15 VITALS
HEART RATE: 82 BPM | TEMPERATURE: 98.5 F | BODY MASS INDEX: 28.65 KG/M2 | RESPIRATION RATE: 14 BRPM | WEIGHT: 194 LBS | OXYGEN SATURATION: 99 % | DIASTOLIC BLOOD PRESSURE: 70 MMHG | SYSTOLIC BLOOD PRESSURE: 145 MMHG

## 2019-11-15 DIAGNOSIS — S39.012A LUMBOSACRAL STRAIN, INITIAL ENCOUNTER: Primary | ICD-10-CM

## 2019-11-15 DIAGNOSIS — M54.16 LUMBAR RADICULOPATHY: ICD-10-CM

## 2019-11-15 PROCEDURE — 99283 EMERGENCY DEPT VISIT LOW MDM: CPT

## 2019-11-15 PROCEDURE — 72080 X-RAY EXAM THORACOLMB 2/> VW: CPT

## 2019-11-15 RX ORDER — CYCLOBENZAPRINE HCL 10 MG
10 TABLET ORAL 3 TIMES DAILY PRN
Qty: 12 TABLET | Refills: 0 | Status: SHIPPED | OUTPATIENT
Start: 2019-11-15 | End: 2019-11-20 | Stop reason: SDUPTHER

## 2019-11-15 RX ORDER — PREDNISONE 20 MG/1
40 TABLET ORAL ONCE
Status: COMPLETED | OUTPATIENT
Start: 2019-11-15 | End: 2019-11-15

## 2019-11-15 RX ORDER — PREDNISONE 20 MG/1
40 TABLET ORAL DAILY
Qty: 8 TABLET | Refills: 0 | Status: SHIPPED | OUTPATIENT
Start: 2019-11-15 | End: 2019-11-19

## 2019-11-15 RX ADMIN — PREDNISONE 40 MG: 20 TABLET ORAL at 17:44

## 2019-11-15 NOTE — ED PROVIDER NOTES
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 Taking?   omeprazole (PriLOSEC) 20 mg delayed release capsule   Yes No   Sig: Take 20 mg by mouth   sertraline (ZOLOFT) 50 mg tablet   No No   Sig: TAKE 1 TABLET (50 MG TOTAL) BY MOUTH DAILY      Facility-Administered Medications: None       Past Medical History:   Diagnosis Date    Anxiety     Bowel obstruction (HCC)     Clostridium difficile colitis 9/13/2018    Colitis     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[facility] [address] ) 9/13/2018    Pancreatitis        Past Surgical History:   Procedure Laterality Date    COLECTOMY TOTAL      with ileal pouch and anastemosis    TOTAL COLECTOMY         History reviewed  No pertinent family history  I have reviewed and agree with the history as documented  Social History     Tobacco Use    Smoking status: Never Smoker    Smokeless tobacco: Never Used   Substance Use Topics    Alcohol use: Yes     Alcohol/week: 3 0 standard drinks     Types: 3 Standard drinks or equivalent per week     Comment:  weekly    Drug use: No        Review of Systems   Constitutional: Negative  Negative for chills, fever and unexpected weight change          Denies IV drug use HENT: Negative  Eyes: Negative  Respiratory: Negative  Negative for cough, chest tightness, shortness of breath and wheezing  Cardiovascular: Negative  Negative for chest pain and palpitations  Gastrointestinal: Negative  Negative for abdominal pain, constipation, diarrhea, nausea and vomiting  Genitourinary: Negative  Negative for difficulty urinating, dysuria, flank pain, frequency, hematuria and urgency  Denies numbness, tingling in the groin  Musculoskeletal: Positive for back pain  Negative for arthralgias, gait problem, joint swelling, myalgias, neck pain and neck stiffness  Skin: Negative  Negative for color change  Neurological: Negative  Negative for dizziness, tremors, weakness, light-headedness, numbness and headaches  All other systems reviewed and are negative  Physical Exam  Physical Exam   Constitutional: He is oriented to person, place, and time  He appears well-developed and well-nourished  HENT:   Head: Normocephalic and atraumatic  Nose: Nose normal    Cardiovascular: Normal rate and intact distal pulses  Pulmonary/Chest: Effort normal    S PO2 is 99% indicating adequate oxygenation   Abdominal: Soft  Bowel sounds are normal  He exhibits no distension and no mass  There is no tenderness  There is no rebound and no guarding  No hernia  Genitourinary:   Genitourinary Comments: Dr Ruben Cuellar performed testicular exam without signs of hernia and without testicular tenderness    Musculoskeletal:        Arms:  Neurological: He is alert and oriented to person, place, and time  Skin: Skin is warm and dry  Capillary refill takes less than 2 seconds  Nursing note and vitals reviewed        Vital Signs  ED Triage Vitals [11/15/19 1700]   Temperature Pulse Respirations Blood Pressure SpO2   98 5 °F (36 9 °C) 82 14 145/70 99 %      Temp Source Heart Rate Source Patient Position - Orthostatic VS BP Location FiO2 (%)   Tympanic Monitor Sitting Right arm -- Pain Score       8           Vitals:    11/15/19 1700   BP: 145/70   Pulse: 82   Patient Position - Orthostatic VS: Sitting         Visual Acuity      ED Medications  Medications   predniSONE tablet 40 mg (40 mg Oral Given 11/15/19 3134)       Diagnostic Studies  Results Reviewed     Procedure Component Value Units Date/Time    UA (URINE) with reflex to Scope [[id number]]     Lab Status:  No result Specimen:  Urine                  XR spine thoracolumbar 2 vw    (Results Pending)              Procedures  Procedures       ED Course                               MDM  Number of Diagnoses or Management Options  Lumbar radiculopathy:   Lumbosacral strain, initial encounter:   Diagnosis management comments: Discussed x-rays, will start on prednisone and Flexeril, informed not to drive or operate heavy machinery while taking Flexeril, encouraged rest and gave proper instructions regarding back strain spasms as well as lumbar radiculopathy  Strict return precautions for any worsening otherwise may follow up with family doctor as needed for any persistent pain  Patient verbalizes understanding and agrees with the above assessment plan         Amount and/or Complexity of Data Reviewed  Tests in the radiology section of CPT®: reviewed and ordered  Review and summarize past medical records: yes  Discuss the patient with other providers: yes (Dr Melvina Hugo who also saw and evaluated patient )  Independent visualization of images, tracings, or specimens: yes        Disposition  Final diagnoses:   Lumbosacral strain, initial encounter   Lumbar radiculopathy     Time reflects when diagnosis was documented in both MDM as applicable and the Disposition within this note     Time User Action Codes Description Comment    11/15/2019  6:27 PM Tim Basket Add [S39 012A] Lumbosacral strain, initial encounter     11/15/2019  6:27 PM Tim Basket Add [M54 16] Lumbar radiculopathy       ED Disposition     ED Disposition Condition Date/Time Comment    Discharge Stable Fri Nov 15, 2019  6:27 PM Rosemary Caffey discharge to home/self care  Follow-up Information     Follow up With Specialties Details Why Contact Info Additional Information    [address] [facility] Emergency Department Emergency Medicine Go to  If symptoms worsen such as numbness or tingling in the groin, weakness, bladder or bowel retention or incontinence [facility]  [phone] [facility], [place], [place], [address] [facility] Schedule an appointment as soon as possible for a visit in 3 days  [address] [facility]  [phone]             Discharge Medication List as of 11/15/2019  6:30 PM      START taking these medications    Details   cyclobenzaprine (FLEXERIL) 10 mg tablet Take 1 tablet (10 mg total) by mouth 3 (three) times a day as needed for muscle spasms for up to 4 days, Starting Fri 11/15/2019, Until Tue 11/19/2019, Normal      predniSONE 20 mg tablet Take 2 tablets (40 mg total) by mouth daily for 4 days, Starting Fri 11/15/2019, Until Tue 11/19/2019, Normal         CONTINUE these medications which have NOT CHANGED    Details   omeprazole (PriLOSEC) 20 mg delayed release capsule Take 20 mg by mouth, Historical Med      sertraline (ZOLOFT) 50 mg tablet TAKE 1 TABLET (50 MG TOTAL) BY MOUTH DAILY, Starting Mon 7/29/2019, Normal           No discharge procedures on file      ED Provider  Electronically Signed by           Linsey Shannon PA-C  11/15/19 8984

## 2019-11-18 ENCOUNTER — TELEPHONE (OUTPATIENT)
Dept: FAMILY MEDICINE CLINIC | Facility: CLINIC | Age: 24
End: 2019-11-18

## 2019-11-18 DIAGNOSIS — F41.1 GAD (GENERALIZED ANXIETY DISORDER): ICD-10-CM

## 2019-11-19 NOTE — TELEPHONE ENCOUNTER
Spoke with patient and is aware he needs to see Dr Barajas Screen    He did schedule for a workman's comp appointment tomorrow 11/20 but understands that this has nothing to do with his medications and needs to still see Dr Barajas Screen    No further action needed

## 2019-11-20 ENCOUNTER — OFFICE VISIT (OUTPATIENT)
Dept: FAMILY MEDICINE CLINIC | Facility: CLINIC | Age: 24
End: 2019-11-20
Payer: OTHER MISCELLANEOUS

## 2019-11-20 VITALS
HEIGHT: 69 IN | RESPIRATION RATE: 16 BRPM | WEIGHT: 190 LBS | SYSTOLIC BLOOD PRESSURE: 130 MMHG | TEMPERATURE: 97.5 F | DIASTOLIC BLOOD PRESSURE: 80 MMHG | OXYGEN SATURATION: 100 % | HEART RATE: 98 BPM | BODY MASS INDEX: 28.14 KG/M2

## 2019-11-20 DIAGNOSIS — M54.16 LUMBAR RADICULOPATHY: ICD-10-CM

## 2019-11-20 DIAGNOSIS — R10.32 LEFT GROIN PAIN: Primary | ICD-10-CM

## 2019-11-20 PROCEDURE — 99213 OFFICE O/P EST LOW 20 MIN: CPT | Performed by: FAMILY MEDICINE

## 2019-11-20 RX ORDER — CYCLOBENZAPRINE HCL 10 MG
10 TABLET ORAL 3 TIMES DAILY PRN
Qty: 20 TABLET | Refills: 0 | Status: SHIPPED | OUTPATIENT
Start: 2019-11-20 | End: 2020-06-17

## 2019-11-20 NOTE — PROGRESS NOTES
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  He went to the ED on 11/15/2019 where an XR showed no abnormalities  He was started on prednisone and flexeril  His back pain improved, but pt has been having severe 10/10 left sided groin pain with radiation down the medial aspect of his left thigh  The pain is described as sharp and shooting  Worsened by certain movements, especially sitting/walking a certain way  Denies saddle anesthesia, weakness, urinary symptoms, bowel symptoms  The following portions of the patient's history were reviewed and updated as appropriate: allergies, current medications, past family history, past medical history, past social history and problem list     Review of Systems   Constitutional: Negative for activity change, appetite change, chills, diaphoresis, fatigue and fever  HENT: Negative for congestion, postnasal drip, rhinorrhea, sinus pressure, sneezing and sore throat  Eyes: Negative  Respiratory: Negative for cough, choking, chest tightness, shortness of breath and wheezing  Cardiovascular: Negative for chest pain and leg swelling  Gastrointestinal: Negative for abdominal distention, anal bleeding, blood in stool, constipation, diarrhea, nausea and vomiting  Genitourinary: Negative  Negative for discharge, penile pain, penile swelling, scrotal swelling and testicular pain          Left groin pain   Musculoskeletal: Negative for arthralgias, gait problem and myalgias  Skin: Negative for color change, pallor, rash and wound  Neurological: Negative for dizziness, tremors, syncope, weakness, light-headedness, numbness and headaches  Psychiatric/Behavioral: Negative  Objective:      /80   Pulse 98   Temp 97 5 °F (36 4 °C)   Resp 16   Ht 5' 9" (1 753 m)   Wt 86 2 kg (190 lb)   SpO2 100%   BMI 28 06 kg/m²          Physical Exam   Constitutional: He is oriented to person, place, and time  He appears well-developed and well-nourished  No distress  Genitourinary: Rectum normal, prostate normal and penis normal  Rectal exam shows guaiac negative stool  No penile tenderness  Musculoskeletal: Normal range of motion  He exhibits tenderness (left groin)  Neurological: He is alert and oriented to person, place, and time  No sensory deficit  He exhibits normal muscle tone  Skin: Skin is warm and dry  No rash noted  He is not diaphoretic  No erythema  No pallor

## 2019-11-25 ENCOUNTER — TELEPHONE (OUTPATIENT)
Dept: FAMILY MEDICINE CLINIC | Facility: CLINIC | Age: 24
End: 2019-11-25

## 2019-11-25 NOTE — TELEPHONE ENCOUNTER
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

## 2019-11-26 ENCOUNTER — TELEPHONE (OUTPATIENT)
Dept: FAMILY MEDICINE CLINIC | Facility: CLINIC | Age: 24
End: 2019-11-26

## 2019-11-26 ENCOUNTER — TELEPHONE (OUTPATIENT)
Dept: SURGERY | Facility: CLINIC | Age: 24
End: 2019-11-26

## 2019-11-26 NOTE — TELEPHONE ENCOUNTER
Received a call from Cognea regarding an appointment for Inguinal henria consult  He was given an appointment on 11/291/9  I received another call back from Erling Moritz (Work Comp) asking for sooner appointment because the patient is having a 10 out of 10 pain level  I advised the Representative Daren Arriaga) to have the patient go immediately to the ER  She stated that she would call the  to inform

## 2019-11-27 DIAGNOSIS — M54.50 LUMBAR BACK PAIN: Primary | ICD-10-CM

## 2019-11-27 NOTE — TELEPHONE ENCOUNTER
Spoke with i[employer] Prabhakar  Letter is in Nico's chart and is also placed in fax bin to be sent to Jil Prabhakar regarding no duty work restriction  No further action

## 2019-11-27 NOTE — TELEPHONE ENCOUNTER
Called patient and discussed ultrasound results with him  WC set up an appointment for general surgery, but it is currently unclear if his groin pain is related to a hernia vs back pain radiating to groin  Today pt states that his pain is more in the low back which radiates to the groin and now down his leg as well  Given new symptoms, I will put in an order for PT to see if this helps with his back  If not, MRI lumbar spine would be next step  No further action at this time

## 2019-11-29 ENCOUNTER — CONSULT (OUTPATIENT)
Dept: SURGERY | Facility: CLINIC | Age: 24
End: 2019-11-29
Payer: OTHER MISCELLANEOUS

## 2019-11-29 VITALS
HEART RATE: 88 BPM | DIASTOLIC BLOOD PRESSURE: 80 MMHG | TEMPERATURE: 97 F | HEIGHT: 69 IN | BODY MASS INDEX: 28.88 KG/M2 | SYSTOLIC BLOOD PRESSURE: 128 MMHG | WEIGHT: 195 LBS

## 2019-11-29 DIAGNOSIS — R10.32 LEFT GROIN PAIN: Primary | ICD-10-CM

## 2019-11-29 DIAGNOSIS — M54.50 ACUTE LEFT-SIDED LOW BACK PAIN WITHOUT SCIATICA: ICD-10-CM

## 2019-11-29 PROCEDURE — 99244 OFF/OP CNSLTJ NEW/EST MOD 40: CPT | Performed by: SPECIALIST

## 2019-11-29 NOTE — LETTER
November 29, 2019     Patient: Ruben Prater   YOB: 1995   Date of Visit: 11/29/2019       To Whom it May Concern:    Mao Cardoza is under my professional care  He was seen in my office on 11/29/2019  If you have any questions or concerns, please don't hesitate to call           Sincerely,          Quinton Gaytan MD        CC: No Recipients

## 2019-11-29 NOTE — PROGRESS NOTES
History and Physical Examination - General Surgery   [facility]  Alis Blanton 30 y o  male MRN: [id number]  Unit/Bed#:  Encounter: [id number] PCP: Morenita Severino DO    History of Present Illness   Chief Complaint:    Acute pain of the back and the left groin since 11/12 2019    HPI:  Alis Blanton is a 30 y o  male who presents to office with left groin pain  Started on November 12, 2019 after a reported injury during exercise at the [address] [place] [address]  Patient was seen in the ER was started on Flexeril and prednisone with minimal improvement  Patient is having pain and was seen by his medical doctor at 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 J-pouch patient is  having  some narrowing at the J pouch outlet  This was done at [place] for ulcerative colitis    Ultrasound done at [place] [address] did not reveal any definite hernia due to difficult examination secondary to bowel gas the interference    A CT scan was requested for diagnosis of a left inguinal hernia    Historical Information   The following portions of the patient's history were reviewed and updated as appropriate  Past Medical History:   Diagnosis Date    Anxiety     Bowel obstruction ([address] )     Clostridium difficile colitis 9/13/2018    Colitis     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[facility] [address] ) 9/13/2018    Pancreatitis      Past Surgical History:   Procedure Laterality Date    COLECTOMY TOTAL      with ileal pouch and anastemosis    TOTAL COLECTOMY       Social History Social History     Substance and Sexual Activity   Alcohol Use Yes    Alcohol/week: 3 0 standard drinks    Types: 3 Standard drinks or equivalent per week    Comment:  weekly     Social History     Substance and Sexual Activity   Drug Use No     Social History     Tobacco Use   Smoking Status Never Smoker   Smokeless Tobacco Never Used     Family History: History reviewed  No pertinent family history  Meds/Allergies   Allergies   Allergen Reactions    Mesalamine GI Intolerance     Other reaction(s): Pancreatitis  [facility] - [id number]: pancreatitis       Current Outpatient Medications:     cyclobenzaprine (FLEXERIL) 10 mg tablet, Take 1 tablet (10 mg total) by mouth 3 (three) times a day as needed for muscle spasms for up to 14 days, Disp: 20 tablet, Rfl: 0    omeprazole (PriLOSEC) 20 mg delayed release capsule, Take 20 mg by mouth, Disp: , Rfl:     sertraline (ZOLOFT) 50 mg tablet, Take 1 tablet (50 mg total) by mouth daily, Disp: 90 tablet, Rfl: 1     REVIEW OF SYSTEMS  Constitutional:  Denies fever or chills denies and for activity changes still able to walk but has constant pain on the left medial side of the thigh  And lower back left side    And pain at the inguinal area near the pubic tubercle and after abduction and stretching of the leg  Eyes:  Denies change in visual acuity   HENT:  Denies nasal congestion or sore throat   Respiratory:  Denies cough or shortness of breath   Cardiovascular:  Denies chest pain or edema   GI:  Denies abdominal pain, nausea, vomiting, diarrhea secondary to total colectomy and THALIA pouch  :  Denies dysuria, frequency, difficulty in micturition and nocturia  Musculoskeletal:  Back pain/left medial side of the thigh pain/sometimes the on walking his left lower extremity loose is power  Neurologic:  Denies headache, focal weakness or sensory changes   Endocrine:  Denies polyuria or polydipsia   Lymphatic:  Denies swollen glands   Psychiatric:  Denies depression or anxiety     Objective   Current Vitals:   /80 (BP Location: Left arm, Patient Position: Sitting, Cuff Size: Standard)   Pulse 88   Temp (!) 97 °F (36 1 °C) (Tympanic)   Ht 5' 9" (1 753 m)   Wt 88 5 kg (195 lb)   BMI 28 80 kg/m²   Body mass index is 28 8 kg/m²  PHYSICAL EXAMS  General:  Patient is not in acute distress, laying in the bed comfortably, awake, alert responding to commands,   HEENT:  Both pupils normal-size atraumatic, normocephalic, nonicteric  Neck:  JVP not raised  Trachea central  Respiratory:  normal Breath sounds clear to auscultation,  Cardiovascular:  S1-S2 normal without any murmur   GI:  Abdomen soft scar from previous tenderness over the medial aspect of the inguinal canal  Over the bony prominences possible adductor insertion site minimal cough impulse no obvious swelling or enlarged hernia  Both the testicles normal size nontender   Musculoskeletal:  Complaining of back pain reproduction of pain on stretching of left leg specially and abduction  Over the upper medial side of the thigh  Integument:  No skin rashes or ulceration  Neurologic:  Patient is awake alert, responding to command, well-oriented to time and place and person moving all extremities ambulating well    Visit Diagnosis:   Diagnoses and all orders for this visit:    Left groin pain  -     CT abdomen pelvis w contrast; Future  -     Basic metabolic panel; Future    Acute left-sided low back pain without sciatica  -     CT abdomen pelvis w contrast; Future  -     Basic metabolic panel; Future       Plan of care was discussed with patient in detail    Pertinent labs reviewed  Pertinent images and available reads personally reviewed  Procedure: Xr Chest Portable    Result Date: 10/5/2019  Narrative: CHEST INDICATION:   pain hx sbo  COMPARISON:  7/3/2019 EXAM PERFORMED/VIEWS:  XR CHEST PORTABLE FINDINGS: Cardiomediastinal silhouette appears unremarkable  The lungs are clear  No pneumothorax or pleural effusion  Osseous structures appear within normal limits for patient age  Impression: No acute cardiopulmonary disease  Workstation performed: [id number]     Procedure: Xr Spine Thoracolumbar 2 Vw    Result Date: 11/16/2019  Narrative: THORACOLUMBAR SPINE INDICATION:   pain  COMPARISON: None VIEWS:  XR SPINE THORACOLUMBAR 2 VW FINDINGS: There is no fracture or pathologic bone lesion  Thoracic vertebral alignment is within normal limits  No significant degenerative changes  There is no displacement of the paraspinal line  The pedicles appear intact  Impression: No acute osseous abnormality  Workstation performed: [id number]     Procedure: Ct Abdomen Pelvis With Contrast    Result Date: 10/4/2019  Narrative: CT ABDOMEN AND PELVIS WITH IV CONTRAST INDICATION: Left upper quadrant pain  COMPARISON:  None  TECHNIQUE:  CT examination of the abdomen and pelvis was performed  Axial, sagittal, and coronal 2D reformatted images were created from the source data and submitted for interpretation  Radiation dose length product (DLP) for this visit:  528 53 mGy-cm   This examination, like all CT scans performed in the [facility], was performed utilizing techniques to minimize radiation dose exposure, including the use of iterative  reconstruction and automated exposure control  IV Contrast:  100 mL of iohexol (OMNIPAQUE) Enteric Contrast:  Enteric contrast was not administered  FINDINGS: ABDOMEN LOWER CHEST:  Clear lung bases  LIVER/BILIARY TREE:  Unremarkable  GALLBLADDER:  No calcified gallstones  No pericholecystic inflammatory change  SPLEEN:  Unremarkable  PANCREAS:  Unremarkable  ADRENAL GLANDS:  Unremarkable  KIDNEYS/URETERS:  No pyelonephritis or obstructive uropathy  STOMACH AND BOWEL:  Postsurgical change from colectomy  Mild wall thickening in the distal small bowel in the right lower abdomen and pelvis  Gas and stool distend the mid and distal small bowel  No evidence of bowel obstruction  APPENDIX:  Surgically absent  ABDOMINOPELVIC CAVITY:  No fluid collection or free intraperitoneal air  Soft tissues nodules in the mid abdominal mesentery and adjacent to the right iliac vessels are stable  VESSELS:  Patent portal and splenic veins  PELVIS REPRODUCTIVE ORGANS:  No prostate enlargement  URINARY BLADDER:  Unremarkable  ABDOMINAL WALL/INGUINAL REGIONS:  Unremarkable  OSSEOUS STRUCTURES:  No acute fracture or destructive osseous lesion  Impression: Enteritis in the distal small bowel  No evidence of obstruction Workstation performed: [id number]     Pertinent notes reviewed    Assessment/Plan   Assessment:  Left lower back pain  Left groin pain  Left lower extremity recurrent sudden loss of power leg give up /limping on the left side while walking  Adductor tendinitis/sports hernia  Plan:  Referral to Dr Fortunato Barber for review  CT scan of abdominal pelvis to rule out the hernia  Patient was explained the most of the symptoms is not related to hernia due to recent trauma or abnormal stretching which will get better with muscle relaxants/nonsteroidal anti-inflammatory medical/and warm compression  Follow-up after CT scan to rule out hernia as ultrasound was inconclusive    Counseling / Coordination of Care  Expained patient in detailed about coordination of care  A description of the counseling / coordination of care:  I performed an interim history, pertinent images and labs, performed a physical examination to arrive at the plan delineated above with associated thought processes  MD Jeremiah Clark    Office   Tel  [phone]  Fax   [phone]

## 2019-12-16 ENCOUNTER — TELEPHONE (OUTPATIENT)
Dept: FAMILY MEDICINE CLINIC | Facility: CLINIC | Age: 24
End: 2019-12-16
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
## 2019-12-16 DIAGNOSIS — R10.32 LEFT GROIN PAIN: Primary | ICD-10-CM

## 2019-12-16 RX ORDER — ACETAMINOPHEN AND CODEINE PHOSPHATE 60; 300 MG/1; MG/1
1 TABLET ORAL EVERY 6 HOURS PRN
Qty: 20 TABLET | Refills: 0 | Status: SHIPPED | OUTPATIENT
Start: 2019-12-16 | End: 2020-06-17

## 2019-12-16 NOTE — TELEPHONE ENCOUNTER
Pt [facility] [employer] [facility] called and scheduled an appt with Dr Josue Narvaez for tomorrow  No further action needed

## 2019-12-16 NOTE — TELEPHONE ENCOUNTER
S/w pt  Agreeable to short term Tylenol #4  He asked about prednisone interaction with zoloft and I told him I did know of a major interaction of steroids, oral or injected

## 2019-12-17 ENCOUNTER — TELEPHONE (OUTPATIENT)
Dept: FAMILY MEDICINE CLINIC | Facility: CLINIC | Age: 24
End: 2019-12-17

## 2019-12-17 PROBLEM — M94.0 ACUTE COSTOCHONDRITIS: Status: RESOLVED | Noted: 2019-04-09 | Resolved: 2019-12-17

## 2020-01-11 ENCOUNTER — APPOINTMENT (EMERGENCY)
Dept: RADIOLOGY | Facility: HOSPITAL | Age: 25
End: 2020-01-11
Attending: EMERGENCY MEDICINE
Payer: COMMERCIAL

## 2020-01-11 ENCOUNTER — HOSPITAL ENCOUNTER (EMERGENCY)
Facility: HOSPITAL | Age: 25
Discharge: HOME | End: 2020-01-12
Attending: EMERGENCY MEDICINE | Admitting: EMERGENCY MEDICINE
Payer: COMMERCIAL

## 2020-01-11 DIAGNOSIS — M54.9 BACK PAIN: ICD-10-CM

## 2020-01-11 DIAGNOSIS — R07.9 CHEST PAIN, UNSPECIFIED: Primary | ICD-10-CM

## 2020-01-11 DIAGNOSIS — K52.9 COLITIS: ICD-10-CM

## 2020-01-11 LAB
APTT PPP: 27 SECONDS (ref 25–32)
BASOPHILS # BLD AUTO: 0.1 THOUSANDS/ΜL (ref 0–0.1)
BASOPHILS NFR BLD AUTO: 1 % (ref 0–1)
EOSINOPHIL # BLD AUTO: 0.49 THOUSAND/ΜL (ref 0–0.61)
EOSINOPHIL NFR BLD AUTO: 5 % (ref 0–6)
ERYTHROCYTE [DISTWIDTH] IN BLOOD BY AUTOMATED COUNT: 17.5 % (ref 11.6–15.1)
HCT VFR BLD AUTO: 39.7 % (ref 36.5–49.3)
HGB BLD-MCNC: 11.8 G/DL (ref 12–17)
IMM GRANULOCYTES # BLD AUTO: 0.02 THOUSAND/UL (ref 0–0.2)
IMM GRANULOCYTES NFR BLD AUTO: 0 % (ref 0–2)
INR PPP: 0.92 (ref 0.91–1.09)
LIPASE SERPL-CCNC: 212 U/L (ref 73–393)
LYMPHOCYTES # BLD AUTO: 2.22 THOUSANDS/ΜL (ref 0.6–4.47)
LYMPHOCYTES NFR BLD AUTO: 22 % (ref 14–44)
MAGNESIUM SERPL-MCNC: 2.1 MG/DL (ref 1.6–2.6)
MCH RBC QN AUTO: 19.2 PG (ref 26.8–34.3)
MCHC RBC AUTO-ENTMCNC: 29.7 G/DL (ref 31.4–37.4)
MCV RBC AUTO: 65 FL (ref 82–98)
MONOCYTES # BLD AUTO: 1.22 THOUSAND/ΜL (ref 0.17–1.22)
MONOCYTES NFR BLD AUTO: 12 % (ref 4–12)
NEUTROPHILS # BLD AUTO: 6.19 THOUSANDS/ΜL (ref 1.85–7.62)
NEUTS SEG NFR BLD AUTO: 60 % (ref 43–75)
NRBC BLD AUTO-RTO: 0 /100 WBCS
PLATELET # BLD AUTO: 372 THOUSANDS/UL (ref 149–390)
PMV BLD AUTO: 8.8 FL (ref 8.9–12.7)
PROTHROMBIN TIME: 9.9 SECONDS (ref 9.8–12)
RBC # BLD AUTO: 6.14 MILLION/UL (ref 3.88–5.62)
TROPONIN I SERPL-MCNC: <0.02 NG/ML
WBC # BLD AUTO: 10.24 THOUSAND/UL (ref 4.31–10.16)

## 2020-01-11 PROCEDURE — 93005 ELECTROCARDIOGRAM TRACING: CPT

## 2020-01-11 PROCEDURE — 36415 COLL VENOUS BLD VENIPUNCTURE: CPT | Performed by: EMERGENCY MEDICINE

## 2020-01-11 PROCEDURE — 99285 EMERGENCY DEPT VISIT HI MDM: CPT

## 2020-01-11 PROCEDURE — 99285 EMERGENCY DEPT VISIT HI MDM: CPT | Performed by: EMERGENCY MEDICINE

## 2020-01-11 PROCEDURE — 85025 COMPLETE CBC W/AUTO DIFF WBC: CPT | Performed by: EMERGENCY MEDICINE

## 2020-01-11 PROCEDURE — 85610 PROTHROMBIN TIME: CPT | Performed by: EMERGENCY MEDICINE

## 2020-01-11 PROCEDURE — 83690 ASSAY OF LIPASE: CPT | Performed by: EMERGENCY MEDICINE

## 2020-01-11 PROCEDURE — 71045 X-RAY EXAM CHEST 1 VIEW: CPT

## 2020-01-11 PROCEDURE — 96374 THER/PROPH/DIAG INJ IV PUSH: CPT

## 2020-01-11 PROCEDURE — 85730 THROMBOPLASTIN TIME PARTIAL: CPT | Performed by: EMERGENCY MEDICINE

## 2020-01-11 PROCEDURE — 94640 AIRWAY INHALATION TREATMENT: CPT

## 2020-01-11 PROCEDURE — 96375 TX/PRO/DX INJ NEW DRUG ADDON: CPT

## 2020-01-11 PROCEDURE — 84484 ASSAY OF TROPONIN QUANT: CPT | Performed by: EMERGENCY MEDICINE

## 2020-01-11 PROCEDURE — 83735 ASSAY OF MAGNESIUM: CPT | Performed by: EMERGENCY MEDICINE

## 2020-01-11 PROCEDURE — 96361 HYDRATE IV INFUSION ADD-ON: CPT

## 2020-01-11 PROCEDURE — C9113 INJ PANTOPRAZOLE SODIUM, VIA: HCPCS | Performed by: EMERGENCY MEDICINE

## 2020-01-11 RX ORDER — PANTOPRAZOLE SODIUM 40 MG/1
40 INJECTION, POWDER, FOR SOLUTION INTRAVENOUS ONCE
Status: COMPLETED | OUTPATIENT
Start: 2020-01-11 | End: 2020-01-11

## 2020-01-11 RX ORDER — MORPHINE SULFATE 4 MG/ML
4 INJECTION, SOLUTION INTRAMUSCULAR; INTRAVENOUS ONCE
Status: COMPLETED | OUTPATIENT
Start: 2020-01-11 | End: 2020-01-11

## 2020-01-11 RX ORDER — KETOROLAC TROMETHAMINE 30 MG/ML
15 INJECTION, SOLUTION INTRAMUSCULAR; INTRAVENOUS ONCE
Status: DISCONTINUED | OUTPATIENT
Start: 2020-01-11 | End: 2020-01-11

## 2020-01-11 RX ADMIN — MORPHINE SULFATE 4 MG: 4 INJECTION INTRAVENOUS at 23:28

## 2020-01-11 RX ADMIN — PANTOPRAZOLE SODIUM 40 MG: 40 INJECTION, POWDER, FOR SOLUTION INTRAVENOUS at 23:29

## 2020-01-11 RX ADMIN — SODIUM CHLORIDE 1000 ML: 0.9 INJECTION, SOLUTION INTRAVENOUS at 23:26

## 2020-01-12 ENCOUNTER — APPOINTMENT (EMERGENCY)
Dept: RADIOLOGY | Facility: HOSPITAL | Age: 25
End: 2020-01-12
Payer: COMMERCIAL

## 2020-01-12 VITALS
DIASTOLIC BLOOD PRESSURE: 68 MMHG | HEART RATE: 88 BPM | OXYGEN SATURATION: 96 % | TEMPERATURE: 98.2 F | WEIGHT: 191 LBS | BODY MASS INDEX: 28.21 KG/M2 | RESPIRATION RATE: 15 BRPM | SYSTOLIC BLOOD PRESSURE: 119 MMHG

## 2020-01-12 LAB — TROPONIN I SERPL-MCNC: <0.02 NG/ML

## 2020-01-12 PROCEDURE — 71275 CT ANGIOGRAPHY CHEST: CPT

## 2020-01-12 PROCEDURE — 96375 TX/PRO/DX INJ NEW DRUG ADDON: CPT

## 2020-01-12 PROCEDURE — 96361 HYDRATE IV INFUSION ADD-ON: CPT

## 2020-01-12 PROCEDURE — 84484 ASSAY OF TROPONIN QUANT: CPT | Performed by: EMERGENCY MEDICINE

## 2020-01-12 PROCEDURE — 74177 CT ABD & PELVIS W/CONTRAST: CPT

## 2020-01-12 PROCEDURE — 96376 TX/PRO/DX INJ SAME DRUG ADON: CPT

## 2020-01-12 PROCEDURE — 93005 ELECTROCARDIOGRAM TRACING: CPT

## 2020-01-12 PROCEDURE — 36415 COLL VENOUS BLD VENIPUNCTURE: CPT | Performed by: EMERGENCY MEDICINE

## 2020-01-12 RX ORDER — ASPIRIN 325 MG
325 TABLET ORAL ONCE
Status: COMPLETED | OUTPATIENT
Start: 2020-01-12 | End: 2020-01-12

## 2020-01-12 RX ORDER — NITROGLYCERIN 0.4 MG/1
0.4 TABLET SUBLINGUAL ONCE
Status: COMPLETED | OUTPATIENT
Start: 2020-01-12 | End: 2020-01-12

## 2020-01-12 RX ORDER — DIAZEPAM 10 MG/1
10 TABLET ORAL EVERY 12 HOURS PRN
Qty: 20 TABLET | Refills: 0 | Status: SHIPPED | OUTPATIENT
Start: 2020-01-12 | End: 2020-06-17

## 2020-01-12 RX ORDER — DIAZEPAM 5 MG/ML
10 INJECTION, SOLUTION INTRAMUSCULAR; INTRAVENOUS ONCE
Status: COMPLETED | OUTPATIENT
Start: 2020-01-12 | End: 2020-01-12

## 2020-01-12 RX ORDER — LORAZEPAM 2 MG/ML
1 INJECTION INTRAMUSCULAR ONCE
Status: DISCONTINUED | OUTPATIENT
Start: 2020-01-12 | End: 2020-01-12

## 2020-01-12 RX ORDER — PREDNISONE 50 MG/1
50 TABLET ORAL DAILY
Qty: 5 TABLET | Refills: 0 | Status: SHIPPED | OUTPATIENT
Start: 2020-01-12 | End: 2020-01-17

## 2020-01-12 RX ORDER — MORPHINE SULFATE 4 MG/ML
4 INJECTION, SOLUTION INTRAMUSCULAR; INTRAVENOUS ONCE
Status: COMPLETED | OUTPATIENT
Start: 2020-01-12 | End: 2020-01-12

## 2020-01-12 RX ORDER — NAPROXEN 500 MG/1
500 TABLET ORAL 2 TIMES DAILY WITH MEALS
Qty: 20 TABLET | Refills: 0 | Status: SHIPPED | OUTPATIENT
Start: 2020-01-12 | End: 2020-06-17

## 2020-01-12 RX ORDER — METHYLPREDNISOLONE SODIUM SUCCINATE 40 MG/ML
60 INJECTION, POWDER, LYOPHILIZED, FOR SOLUTION INTRAMUSCULAR; INTRAVENOUS ONCE
Status: COMPLETED | OUTPATIENT
Start: 2020-01-12 | End: 2020-01-12

## 2020-01-12 RX ADMIN — ASPIRIN 325 MG: 325 TABLET, FILM COATED ORAL at 02:36

## 2020-01-12 RX ADMIN — MORPHINE SULFATE 4 MG: 4 INJECTION INTRAVENOUS at 00:38

## 2020-01-12 RX ADMIN — METHYLPREDNISOLONE SODIUM SUCCINATE 60 MG: 40 INJECTION, POWDER, FOR SOLUTION INTRAMUSCULAR; INTRAVENOUS at 00:41

## 2020-01-12 RX ADMIN — NITROGLYCERIN 0.4 MG: 0.4 TABLET SUBLINGUAL at 02:45

## 2020-01-12 RX ADMIN — ALBUTEROL SULFATE 5 MG: 2.5 SOLUTION RESPIRATORY (INHALATION) at 00:40

## 2020-01-12 RX ADMIN — IOHEXOL 100 ML: 350 INJECTION, SOLUTION INTRAVENOUS at 00:23

## 2020-01-12 RX ADMIN — DIAZEPAM 10 MG: 10 INJECTION, SOLUTION INTRAMUSCULAR; INTRAVENOUS at 02:28

## 2020-01-12 RX ADMIN — IPRATROPIUM BROMIDE 0.5 MG: 0.5 SOLUTION RESPIRATORY (INHALATION) at 00:40

## 2020-01-12 NOTE — ED PROVIDER NOTES
History  Chief Complaint   Patient presents with    Chest Pain     started with chest discomfort about 5am states radiates to L side of back, worse with deep breath and laying down, denies cough, or strenuous activity     24-year-old male with past medical history of ulcerative colitis status post hemicolectomy, DVT after hemicolectomy surgery, pancreatitis, multiple episodes of SBO, presents to the ER with complaint of left-sided chest pain that started at rest while patient was at work early this morning that is now radiating to the left upper back  Patient states pain increases with deep inspiration  Patient is also concerned about pancreatitis as he has recurrent history of pancreatitis in the past causing some epigastric and substernal pain  Patient denies any fevers or chills  Patient came to the ED as the pain is persisting and radiating to the back now  Patient denies any previous history of cardiovascular events or stroke in the past   Patient denies any history of strokes or MI in first-degree relatives  Patient states that he had 1 episode of DVT after his hemicolectomy study and he was on a short course of anti coagulant at that time  History provided by:  Patient  Chest Pain   Associated symptoms: no abdominal pain, no back pain, no cough, no dizziness, no fatigue, no fever, no headache, no nausea, no shortness of breath, not vomiting and no weakness        Prior to Admission Medications   Prescriptions Last Dose Informant Patient Reported? Taking?    ALPRAZolam (XANAX) 0 5 mg tablet Not Taking at Unknown time  Yes No   Sig: alprazolam 0 5 mg tablet   Adalimumab (HUMIRA PEN) 40 MG/0 8ML PNKT Not Taking at Unknown time  Yes No   Sig: Humira Pen 40 mg/0 8 mL subcutaneous kit   acetaminophen-codeine (TYLENOL #4) 300-60 MG per tablet Not Taking at Unknown time  No No   Sig: Take 1 tablet by mouth every 6 (six) hours as needed for severe pain   Patient not taking: Reported on 1/11/2020 budesonide (UCERIS) 9 mg TB24 Not Taking at Unknown time  Yes No   Sig: Uceris 9 mg tablet, extended release   cyclobenzaprine (FLEXERIL) 10 mg tablet   No No   Sig: Take 1 tablet (10 mg total) by mouth 3 (three) times a day as needed for muscle spasms for up to 14 days   hydrocortisone-pramoxine (PROCTOFOAM-HC) rectal foam Not Taking at Unknown time  Yes No   Sig: Insert 1 applicator into the rectum   mesalamine (APRISO) 0 375 g 24 hr capsule Not Taking at Unknown time  Yes No   Sig: Apriso 0 375 gram capsule,extended release   mesalamine (LIALDA) 1 2 g EC tablet Not Taking at Unknown time  Yes No   Sig: Lialda 1 2 gram tablet,delayed release   omeprazole (PriLOSEC) 20 mg delayed release capsule   Yes No   Sig: Take 20 mg by mouth daily    ondansetron (ZOFRAN-ODT) 4 mg disintegrating tablet Not Taking at Unknown time  Yes No   Sig: ondansetron 4 mg disintegrating tablet   pantoprazole (PROTONIX) 40 mg tablet Not Taking at Unknown time  Yes No   Sig: pantoprazole 40 mg tablet,delayed release   predniSONE 10 mg tablet Not Taking at Unknown time  Yes No   Sig: prednisone 10 mg tablet   sertraline (ZOLOFT) 50 mg tablet   No No   Sig: Take 1 tablet (50 mg total) by mouth daily   zolpidem (AMBIEN CR) 12 5 MG CR tablet Not Taking at Unknown time  Yes No   Sig: zolpidem ER 12 5 mg tablet,extended release,multiphase      Facility-Administered Medications: None       Past Medical History:   Diagnosis Date    Anxiety     Bowel obstruction (HCC)     Clostridium difficile colitis 9/13/2018    Colitis     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[facility] [address] ) 9/13/2018    Pancreatitis        Past Surgical History:   Procedure Laterality Date    COLECTOMY TOTAL      with ileal pouch and anastemosis    TOTAL COLECTOMY         History reviewed  No pertinent family history  I have reviewed and agree with the history as documented      Social History     Tobacco Use    Smoking status: Never Smoker    Smokeless tobacco: Never Used   Substance Use Topics  Alcohol use: Yes     Alcohol/week: 3 0 standard drinks     Types: 3 Standard drinks or equivalent per week     Comment:  weekly    Drug use: No        Review of Systems   Constitutional: Negative for activity change, fatigue and fever  HENT: Negative for congestion, ear discharge and sore throat  Eyes: Negative for pain and redness  Respiratory: Negative for cough, chest tightness, shortness of breath and wheezing  Cardiovascular: Positive for chest pain  Gastrointestinal: Negative for abdominal pain, diarrhea, nausea and vomiting  Endocrine: Negative for cold intolerance  Genitourinary: Negative for dysuria and urgency  Musculoskeletal: Negative for arthralgias and back pain  Neurological: Negative for dizziness, weakness and headaches  Psychiatric/Behavioral: Negative for agitation and behavioral problems  Physical Exam  Physical Exam   Constitutional: He is oriented to person, place, and time  He appears well-developed and well-nourished  HENT:   Head: Normocephalic and atraumatic  Nose: Nose normal    Mouth/Throat: Oropharynx is clear and moist    Eyes: Conjunctivae and EOM are normal    Neck: Normal range of motion  Neck supple  Cardiovascular: Regular rhythm and normal heart sounds  Patient is tachycardic on the monitor  Pulmonary/Chest: Effort normal and breath sounds normal  No accessory muscle usage  No respiratory distress  Lungs are clear to auscultation bilateral   Abdominal: Soft  Bowel sounds are normal  He exhibits no distension  There is no tenderness  Musculoskeletal: Normal range of motion  Mild paraspinous tenderness to palpation noted along thoracic spine on the left side along the trapezius distribution  Neurological: He is alert and oriented to person, place, and time  Skin: Skin is warm  Psychiatric: He has a normal mood and affect  His behavior is normal  Judgment and thought content normal    Nursing note and vitals reviewed        Vital Signs  ED Triage Vitals [01/11/20 2254]   Temperature Pulse Respirations Blood Pressure SpO2   98 2 °F (36 8 °C) (!) 110 20 132/78 98 %      Temp Source Heart Rate Source Patient Position - Orthostatic VS BP Location FiO2 (%)   Tympanic Monitor Sitting Right arm --      Pain Score       7           Vitals:    01/12/20 0215 01/12/20 0230 01/12/20 0245 01/12/20 0300   BP: 131/87 111/54 143/82 123/60   Pulse: 98 92 90 92   Patient Position - Orthostatic VS:             Visual Acuity      ED Medications  Medications   sodium chloride 0 9 % bolus 1,000 mL (0 mL Intravenous Stopped 1/12/20 0221)   pantoprazole (PROTONIX) injection 40 mg (40 mg Intravenous Given 1/11/20 2329)   morphine (PF) 4 mg/mL injection 4 mg (4 mg Intravenous Given 1/11/20 2328)   iohexol (OMNIPAQUE) 350 MG/ML injection (MULTI-DOSE) 100 mL (100 mL Intravenous Given 1/12/20 0023)   ipratropium (ATROVENT) 0 02 % inhalation solution 0 5 mg (0 5 mg Nebulization Given 1/12/20 0040)   albuterol inhalation solution 5 mg (5 mg Nebulization Given 1/12/20 0040)   methylPREDNISolone sodium succinate (Solu-MEDROL) injection 60 mg (60 mg Intravenous Given 1/12/20 0041)   morphine (PF) 4 mg/mL injection 4 mg (4 mg Intravenous Given 1/12/20 0038)   diazepam (VALIUM) injection 10 mg (10 mg Intravenous Given 1/12/20 0228)   nitroglycerin (NITROSTAT) SL tablet 0 4 mg (0 4 mg Sublingual Given 1/12/20 0245)   aspirin tablet 325 mg (325 mg Oral Given 1/12/20 0236)       Diagnostic Studies  Results Reviewed     Procedure Component Value Units Date/Time    Troponin I [[id number]]  (Normal) Collected:  01/12/20 0230    Lab Status:  Final result Specimen:  Blood from Arm, Left Updated:  01/12/20 0254     Troponin I <0 02 ng/mL     Troponin I [[id number]]  (Normal) Collected:  01/11/20 2323    Lab Status:  Final result Specimen:  Blood from Arm, Left Updated:  01/11/20 2354     Troponin I <0 02 ng/mL     Protime-INR [[id number]]  (Normal) Collected:  01/11/20 1414    Lab Status: Final result Specimen:  Blood from Arm, Left Updated:  01/11/20 2349     Protime 9 9 seconds      INR 0 92    APTT [[id number]]  (Normal) Collected:  01/11/20 2323    Lab Status:  Final result Specimen:  Blood from Arm, Left Updated:  01/11/20 2349     PTT 27 seconds     Magnesium [[id number]]  (Normal) Collected:  01/11/20 2323    Lab Status:  Final result Specimen:  Blood from Arm, Left Updated:  01/11/20 2346     Magnesium 2 1 mg/dL     Lipase [[id number]]  (Normal) Collected:  01/11/20 2323    Lab Status:  Final result Specimen:  Blood from Arm, Left Updated:  01/11/20 2346     Lipase 212 u/L     CBC and differential [[id number]]  (Abnormal) Collected:  01/11/20 2323    Lab Status:  Final result Specimen:  Blood from Arm, Left Updated:  01/11/20 2335     WBC 10 24 Thousand/uL      RBC 6 14 Million/uL      Hemoglobin 11 8 g/dL      Hematocrit 39 7 %      MCV 65 fL      MCH 19 2 pg      MCHC 29 7 g/dL      RDW 17 5 %      MPV 8 8 fL      Platelets 645 Thousands/uL      nRBC 0 /100 WBCs      Neutrophils Relative 60 %      Immat GRANS % 0 %      Lymphocytes Relative 22 %      Monocytes Relative 12 %      Eosinophils Relative 5 %      Basophils Relative 1 %      Neutrophils Absolute 6 19 Thousands/µL      Immature Grans Absolute 0 02 Thousand/uL      Lymphocytes Absolute 2 22 Thousands/µL      Monocytes Absolute 1 22 Thousand/µL      Eosinophils Absolute 0 49 Thousand/µL      Basophils Absolute 0 10 Thousands/µL     UA w Reflex to Microscopic w Reflex to Culture [[id number]]     Lab Status:  No result Specimen:  Urine     Rapid drug screen, urine [[id number]]     Lab Status:  No result Specimen:  Urine                  PE Study with CT Abdomen and Pelvis with contrast   Final Result by Faustino Stallworth MD (01/12 0206)            Patient is post left hemicolectomy  Multiple bowel anastomoses are noted  The distal colon and rectum demonstrate mural thickening concerning for colitis         Right iliac and abdominal adenopathy measuring up to 1 5 cm is noted similar to the prior exam          No pulmonary embolism or aortic dissection  No effusion, airspace disease, or pneumothorax  Workstation performed: [id number]         XR chest portable    (Results Pending)              Procedures  ECG 12 Lead Documentation Only  Date/Time: 1/11/2020 10:57 PM  Performed by: Shelia Tidwell DO  Authorized by: Shelia Tidwell DO     Indications / Diagnosis:  Chest pain  ECG reviewed by me, the ED Provider: yes    Patient location:  ED  Previous ECG:     Previous ECG:  Compared to current    Similarity:  Changes noted    Comparison to cardiac monitor: Yes    Interpretation:     Interpretation: abnormal    Comments:      Sinus rhythm, rate 102, normal axis, normal intervals, no acute ST/towards noted, sinus tachycardia that is new compared to previous EKG  ECG 12 Lead Documentation Only  Date/Time: 1/12/2020 2:36 AM  Performed by: Shelia Tidwell DO  Authorized by: Shelia Tidwell DO     Indications / Diagnosis:  Chest pain  ECG reviewed by me, the ED Provider: yes    Patient location:  ED  Previous ECG:     Previous ECG:  Compared to current    Similarity:  Changes noted    Comparison to cardiac monitor: Yes    Interpretation:     Interpretation: normal    Comments:      Sinus rhythm, rate 95, normal axis, normal intervals, no acute ST/T-wave noted, otherwise unremarkable EKG, previously noted sinus tachycardia from earlier today has resolved  ED Course  ED Course as of Jan 12 0322   Sun Jan 12, 2020   0036 Patient re-examined at bedside  Patient states that morphine initially helped with his chest pain however pain is starting to return  Patient is having difficulty taking deep inspiration  Troponin is negative in the ED  CT study is pending  At this point will give neb treatment, steroids, another dose of morphine and reassess  8817 Patient re-examined at bedside  Patient feels much better after Valium    At this time his pain is most likely musculoskeletal in origin  Patient is requesting to go home              HEART Risk Score      Most Recent Value   History  0 Filed at: 01/12/2020 0310   ECG  0 Filed at: 01/12/2020 0310   Age  0 Filed at: 01/12/2020 0310   Risk Factors  0 Filed at: 01/12/2020 0310   Troponin  0 Filed at: 01/12/2020 0310   Heart Score Risk Calculator   History  0 Filed at: 01/12/2020 0310   ECG  0 Filed at: 01/12/2020 0310   Age  0 Filed at: 01/12/2020 0310   Risk Factors  0 Filed at: 01/12/2020 0310   Troponin  0 Filed at: 01/12/2020 0310   HEART Score  0 Filed at: 01/12/2020 0310   HEART Score  0 Filed at: 01/12/2020 4660                      Wells' Criteria for PE      Most Recent Value   Wells' Criteria for PE   Clinical signs and symptoms of DVT  0 Filed at: 01/11/2020 2316   PE is primary diagnosis or equally likely  3 Filed at: 01/11/2020 2316   HR >100  1 5 Filed at: 01/11/2020 2316   Immobilization at least 3 days or Surgery in the previous 4 weeks  0 Filed at: 01/11/2020 2316   Previous, objectively diagnosed PE or DVT  1 5 Filed at: 01/11/2020 2316   Hemoptysis  0 Filed at: 01/11/2020 2316   Malignancy with treatment within 6 months or palliative  0 Filed at: 01/11/2020 2316   Demetria Dale' Criteria Total  6 Filed at: 01/11/2020 2316            MDM  Number of Diagnoses or Management Options  Back pain: new and requires workup  Chest pain, unspecified: new and requires workup  Colitis: new and requires workup  Diagnosis management comments: Obtain blood work, EKG, CTA PE study with abdomen pelvis to rule out any acute pathology  Give IV fluids, pain medication and reassess symptoms       Amount and/or Complexity of Data Reviewed  Clinical lab tests: reviewed and ordered  Tests in the radiology section of CPT®: ordered and reviewed  Tests in the medicine section of CPT®: ordered and reviewed  Review and summarize past medical records: yes  Independent visualization of images, tracings, or specimens: yes    Risk of Complications, Morbidity, and/or Mortality  General comments: Patient's blood work showed very mild elevation in white count  CTA chest did not show any acute abnormalities or PE   CT abdomen/pelvis showed concern for colitis  Patient continued to have chest and back pain despite given morphine and IV fluids in the ED  Patient had to consider a negative troponin as well as 2 EKGs in the ED that was unremarkable  Patient subsequently given IV Valium which significantly improved his back pain  At this time patient's pain is most likely musculoskeletal in origin  Will place patient on prednisone burst and discharge home on p r n  Valium and naproxen with follow-up to PCP, GI, orthopedic surgery and Cardiology for any worsening symptoms  Close return instructions given to return to the ER for any worsening symptoms  Patient agrees with discharge plan  Patient well appearing at time of discharge  Patient Progress  Patient progress: improved        Disposition  Final diagnoses:   Chest pain, unspecified   Back pain   Colitis     Time reflects when diagnosis was documented in both MDM as applicable and the Disposition within this note     Time User Action Codes Description Comment    1/12/2020  3:14 AM Pearletha Laming Add [R07 9] Chest pain, unspecified     1/12/2020  3:15 AM Pearletha Laming Add [M54 9] Back pain     1/12/2020  3:15 AM Pearletha Laming Add [K52 9] Colitis       ED Disposition     ED Disposition Condition Date/Time Comment    Discharge Stable Sun Jan 12, 2020  3:14 AM Lotus Kamara discharge to home/self care  Follow-up Information     Follow up With Specialties Details Why [address] [facility], DO Family Medicine In 2 days  [facility] [place] [address] [phone]      Natalia Cardona MD Cardiology  Please follow up if you continue to have any further episodes of chest pain   [address] [facility] [address] Smiley Rinaldi MD Orthopedic Surgery In 1 week Please follow up if you continue to have any further episodes of back pain  [address]  [phone]          Please call and make a follow-up appointment with her GI physician  Patient's Medications   Discharge Prescriptions    DIAZEPAM (VALIUM) 10 MG TABLET    Take 1 tablet (10 mg total) by mouth every 12 (twelve) hours as needed for muscle spasms for up to 10 days       Start Date: 1/12/2020 End Date: 1/22/2020       Order Dose: 10 mg       Quantity: 20 tablet    Refills: 0    NAPROXEN (EC NAPROSYN) 500 MG EC TABLET    Take 1 tablet (500 mg total) by mouth 2 (two) times a day with meals       Start Date: 1/12/2020 End Date: --       Order Dose: 500 mg       Quantity: 20 tablet    Refills: 0    PREDNISONE 50 MG TABLET    Take 1 tablet (50 mg total) by mouth daily for 5 days       Start Date: 1/12/2020 End Date: 1/17/2020       Order Dose: 50 mg       Quantity: 5 tablet    Refills: 0     No discharge procedures on file      ED Provider  Electronically Signed by           Christiana Mathew DO  01/12/20 0524

## 2020-01-14 ENCOUNTER — TELEPHONE (OUTPATIENT)
Dept: OBGYN CLINIC | Facility: HOSPITAL | Age: 25
End: 2020-01-14

## 2020-01-14 LAB
ATRIAL RATE: 102 BPM
ATRIAL RATE: 105 BPM
ATRIAL RATE: 95 BPM
P AXIS: 41 DEGREES
P AXIS: 55 DEGREES
P AXIS: 67 DEGREES
PR INTERVAL: 158 MS
PR INTERVAL: 164 MS
PR INTERVAL: 164 MS
QRS AXIS: -3 DEGREES
QRS AXIS: 2 DEGREES
QRS AXIS: 8 DEGREES
QRSD INTERVAL: 88 MS
QRSD INTERVAL: 88 MS
QRSD INTERVAL: 92 MS
QT INTERVAL: 342 MS
QT INTERVAL: 344 MS
QT INTERVAL: 350 MS
QTC INTERVAL: 429 MS
QTC INTERVAL: 448 MS
QTC INTERVAL: 462 MS
T WAVE AXIS: 20 DEGREES
T WAVE AXIS: 5 DEGREES
T WAVE AXIS: 5 DEGREES
VENTRICULAR RATE: 102 BPM
VENTRICULAR RATE: 105 BPM
VENTRICULAR RATE: 95 BPM

## 2020-01-14 PROCEDURE — 93010 ELECTROCARDIOGRAM REPORT: CPT | Performed by: INTERNAL MEDICINE

## 2020-01-14 NOTE — TELEPHONE ENCOUNTER
Patient sees Dr Davi Wilcox at [employer] is calling in wanting to get the patients last updated work note faxed over to them    Fax # [phone]

## 2020-01-15 ENCOUNTER — VBI (OUTPATIENT)
Dept: FAMILY MEDICINE CLINIC | Facility: CLINIC | Age: 25
End: 2020-01-15

## 2020-01-15 NOTE — LETTER
[facility]  [address] [place] [address]  [place] [address]    Date: 01/16/20       Clarissa Waldrop  [address] [facility] [address]  [place] [address]    Dear Abram Habermann:                                                                                                                              Thank you for choosing [facility] Emergency Department for care  As your primary care provider we want to make sure that your ongoing medical care is being addressed  If you require follow up care as a result of your emergency department visit, there are a few things we would like you to know  As part of our continuing commitment to caring for our patient, we have added more same day appointments and have extended our office hours to meet your medical needs  After hours, on-call physicians are available via our main office line  We encourage you to contact our office prior to seeking treatment to discuss your symptoms with our medical staff  Together, we can determine the correct course of action  A majority of non-emergent conditions such as: common cold, flu-like symptoms, fevers, strains/sprains, dislocations, minor burns, cuts and animal bites can be treated at [facility]  Diagnostic testing is available at some sites  Of course, if you are experiencing a life threatening medical emergency call 911 or proceed directly to the nearest emergency room      SKIP THE WAIT  Conveniently offered at most [facility] locations  [employer] your spot online at [email or web address] or on the [facility] [address]    Sincerely,          [facility]  Dept: [phone]

## 2020-01-15 NOTE — TELEPHONE ENCOUNTER
Regis Izquierdo    ED Visit Information     Ed visit date: 01/11/2020  Diagnosis Description: Chest pain, unspecified; Back pain; Colitis  In Network? Yes [address] [facility]  Discharge status: Home  Discharged with meds ? Yes  Number of ED visits to date: 3  ED Severity:NA     Outreach Information    Outreach successful: Yes 2  Date letter mailed:01/16/2020  Date Finalized:01/16/2020    Care Coordination    Follow up appointment with pcp: no no  Transportation issues ?  NA    [employer] Outreach    01/15/2020 11:38 AM - [facility]  01/16/2020 11:56 AM

## 2020-06-17 ENCOUNTER — HOSPITAL ENCOUNTER (EMERGENCY)
Facility: HOSPITAL | Age: 25
Discharge: HOME | End: 2020-06-17
Attending: EMERGENCY MEDICINE | Admitting: EMERGENCY MEDICINE
Payer: OTHER MISCELLANEOUS

## 2020-06-17 VITALS
WEIGHT: 185 LBS | HEART RATE: 109 BPM | DIASTOLIC BLOOD PRESSURE: 75 MMHG | RESPIRATION RATE: 20 BRPM | TEMPERATURE: 99.3 F | SYSTOLIC BLOOD PRESSURE: 144 MMHG | BODY MASS INDEX: 27.32 KG/M2 | OXYGEN SATURATION: 98 %

## 2020-06-17 DIAGNOSIS — Z77.21 EXPOSURE TO BLOOD OR BODY FLUID: ICD-10-CM

## 2020-06-17 DIAGNOSIS — T14.8XXA ABRASION: Primary | ICD-10-CM

## 2020-06-17 LAB
ALT SERPL W P-5'-P-CCNC: 40 U/L (ref 12–78)
HBV SURFACE AB SER-ACNC: 345.46 MIU/ML
HBV SURFACE AG SER QL: NORMAL
HCV AB SER QL: NORMAL

## 2020-06-17 PROCEDURE — 87340 HEPATITIS B SURFACE AG IA: CPT | Performed by: EMERGENCY MEDICINE

## 2020-06-17 PROCEDURE — 99284 EMERGENCY DEPT VISIT MOD MDM: CPT

## 2020-06-17 PROCEDURE — 86803 HEPATITIS C AB TEST: CPT | Performed by: EMERGENCY MEDICINE

## 2020-06-17 PROCEDURE — 84460 ALANINE AMINO (ALT) (SGPT): CPT | Performed by: EMERGENCY MEDICINE

## 2020-06-17 PROCEDURE — 87389 HIV-1 AG W/HIV-1&-2 AB AG IA: CPT | Performed by: EMERGENCY MEDICINE

## 2020-06-17 PROCEDURE — 99282 EMERGENCY DEPT VISIT SF MDM: CPT | Performed by: EMERGENCY MEDICINE

## 2020-06-17 PROCEDURE — 36415 COLL VENOUS BLD VENIPUNCTURE: CPT | Performed by: EMERGENCY MEDICINE

## 2020-06-17 PROCEDURE — 86706 HEP B SURFACE ANTIBODY: CPT | Performed by: EMERGENCY MEDICINE

## 2020-06-18 LAB — HIV 1+2 AB+HIV1 P24 AG SERPL QL IA: NORMAL

## 2020-07-08 ENCOUNTER — OFFICE VISIT (OUTPATIENT)
Dept: FAMILY MEDICINE CLINIC | Facility: CLINIC | Age: 25
End: 2020-07-08
Payer: COMMERCIAL

## 2020-07-08 VITALS
HEART RATE: 67 BPM | WEIGHT: 185 LBS | TEMPERATURE: 98 F | HEIGHT: 69 IN | RESPIRATION RATE: 18 BRPM | DIASTOLIC BLOOD PRESSURE: 72 MMHG | SYSTOLIC BLOOD PRESSURE: 126 MMHG | BODY MASS INDEX: 27.4 KG/M2 | OXYGEN SATURATION: 98 %

## 2020-07-08 DIAGNOSIS — M54.16 LUMBAR RADICULOPATHY: ICD-10-CM

## 2020-07-08 DIAGNOSIS — M54.50 LUMBAR BACK PAIN: Primary | ICD-10-CM

## 2020-07-08 DIAGNOSIS — M54.2 CERVICAL PAIN (NECK): ICD-10-CM

## 2020-07-08 DIAGNOSIS — M54.12 CERVICAL RADICULOPATHY: ICD-10-CM

## 2020-07-08 PROCEDURE — 99213 OFFICE O/P EST LOW 20 MIN: CPT | Performed by: FAMILY MEDICINE

## 2020-07-08 PROCEDURE — 3008F BODY MASS INDEX DOCD: CPT | Performed by: FAMILY MEDICINE

## 2020-07-08 PROCEDURE — 1036F TOBACCO NON-USER: CPT | Performed by: FAMILY MEDICINE

## 2020-07-08 RX ORDER — GABAPENTIN 100 MG/1
100 CAPSULE ORAL 3 TIMES DAILY
Qty: 90 CAPSULE | Refills: 0 | Status: SHIPPED | OUTPATIENT
Start: 2020-07-08 | End: 2020-09-15

## 2020-07-08 RX ORDER — NAPROXEN 500 MG/1
500 TABLET ORAL 2 TIMES DAILY WITH MEALS
Qty: 28 TABLET | Refills: 0 | Status: SHIPPED | OUTPATIENT
Start: 2020-07-08 | End: 2020-09-14

## 2020-07-08 NOTE — PROGRESS NOTES
Assessment/Plan:       Diagnoses and all orders for this visit:    Lumbar back pain with radiculopathy   Cervical pain (neck) with radiculopathy  -     gabapentin (NEURONTIN) 100 mg capsule; Take 1 capsule (100 mg total) by mouth 3 (three) times a day  Discussed starting with 100mg qhs and increasing dose upto to 300 mg TID if pain is not controlled  -     naproxen (NAPROSYN) 500 mg tablet; Take 1 tablet (500 mg total) by mouth 2 (two) times a day with meals for 14 days  Pt told to use sparingly given hx of ulcerative colitis  States he has tolerated naproxen well in the past if he uses it minimally and only if pain is severe  - Continue physical therapy  - MRIs reviewed today with no significant acute pathology  - Pt has follow up appointment being scheduled with neurosurgery at [facility] for further testing and evaluation  He does have concern for MS and possible Lyme Disease  I did offer testing here, but states he will hold off until evaluation by [facility]  - Advised to return if sx worsen or fail to improve  BMI Counseling: Body mass index is 27 32 kg/m²  Discussed with patient's BMI with him  The BMI is above normal  Nutrition recommendations include reducing fast food intake and consuming healthier snacks  Exercise recommendations include exercising 3-5 times per week  Subjective:      Patient ID: Euna Goodpasture is a 30 y o  male  HPI   Geraldo Coats presents today to discuss upper and lower back pain with radiculopathy 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  Pt states that since that injury he started having intermittent pain in his neck and low back with sharp radiating pain down the back of his legs bilaterally as well as down his left arm with occasional numbness in his fingers  He has been doing physical therapy since the injury with minimal relief  He did get full spine MRIs which showed no significant acute pathology   Pt states he has been taking Tylenol and Ibuprofen for the pain with some relief, but there are days where it gets to 10/10 intensity making it difficult to sleep or walk  Pt also feels like the pain radiates to his chest and feels like his chest is on fire and burning  Quality of pain is always burning/sharp  Pt states that he is going to [facility] neurosurgery for further evaluation and to be tested for possible MS  States his friend who has MS presented similarly  He is also concerned about possible Lyme disease  Denies fevers, chills, headaches, dizziness, weakness, vision changes, tingling  The following portions of the patient's history were reviewed and updated as appropriate: allergies, current medications, past family history, past medical history, past social history, past surgical history and problem list     Review of Systems   Constitutional: Negative for activity change, appetite change, chills, diaphoresis, fatigue and fever  HENT: Negative for congestion, postnasal drip, rhinorrhea, sinus pressure, sneezing and sore throat  Eyes: Negative  Respiratory: Negative for cough, choking, chest tightness, shortness of breath and wheezing  Cardiovascular: Negative for chest pain and leg swelling  Gastrointestinal: Negative for abdominal distention, anal bleeding, blood in stool, constipation, diarrhea, nausea and vomiting  Genitourinary: Negative  Musculoskeletal: Positive for back pain, neck pain and neck stiffness  Negative for arthralgias, gait problem and myalgias  Skin: Negative for color change, pallor, rash and wound  Neurological: Negative for dizziness, tremors, syncope, weakness, light-headedness, numbness and headaches  Psychiatric/Behavioral: Negative  Objective:      /72   Pulse 67   Temp 98 °F (36 7 °C)   Resp 18   Ht 5' 9" (1 753 m)   Wt 83 9 kg (185 lb)   SpO2 98%   BMI 27 32 kg/m²          Physical Exam   Constitutional: He is oriented to person, place, and time   He appears well-developed and well-nourished  No distress  HENT:   Head: Normocephalic and atraumatic  Eyes: Conjunctivae are normal  Right eye exhibits no discharge  Left eye exhibits no discharge  No scleral icterus  Neck: Normal range of motion  Neck supple  Cardiovascular: Normal rate, regular rhythm, normal heart sounds and intact distal pulses  Exam reveals no gallop and no friction rub  No murmur heard  Pulmonary/Chest: Effort normal and breath sounds normal  No respiratory distress  He has no wheezes  He has no rales  He exhibits no tenderness  Musculoskeletal: He exhibits no edema, tenderness or deformity  Cervical back: He exhibits decreased range of motion and pain  He exhibits no tenderness, no bony tenderness, no swelling, no edema, no deformity, no laceration, no spasm and normal pulse  Lumbar back: He exhibits decreased range of motion and pain  He exhibits no tenderness, no bony tenderness, no swelling, no edema, no deformity, no laceration and no spasm  Neurological: He is alert and oriented to person, place, and time  He displays normal reflexes  No sensory deficit  He exhibits normal muscle tone  Coordination normal    Skin: Skin is warm and dry  No rash noted  He is not diaphoretic  No erythema  No pallor  Psychiatric: He has a normal mood and affect   His behavior is normal  Judgment and thought content normal

## 2020-07-10 ENCOUNTER — TELEPHONE (OUTPATIENT)
Dept: FAMILY MEDICINE CLINIC | Facility: CLINIC | Age: 25
End: 2020-07-10

## 2020-07-10 NOTE — TELEPHONE ENCOUNTER
PT was rx gabapentin, Pt states it is not working with taking the pain away  It is making him very anxious and shaky  Pt  Is wondering if something else can be prescribed to help with pain for the weekend?   pls advise

## 2020-07-20 ENCOUNTER — HOSPITAL ENCOUNTER (EMERGENCY)
Facility: HOSPITAL | Age: 25
Discharge: HOME | End: 2020-07-20
Attending: EMERGENCY MEDICINE | Admitting: EMERGENCY MEDICINE
Payer: OTHER MISCELLANEOUS

## 2020-07-20 VITALS
RESPIRATION RATE: 18 BRPM | OXYGEN SATURATION: 95 % | SYSTOLIC BLOOD PRESSURE: 140 MMHG | HEART RATE: 85 BPM | DIASTOLIC BLOOD PRESSURE: 86 MMHG | TEMPERATURE: 99.1 F

## 2020-07-20 DIAGNOSIS — M54.30 SCIATICA: ICD-10-CM

## 2020-07-20 DIAGNOSIS — S39.012A STRAIN OF LUMBAR REGION, INITIAL ENCOUNTER: Primary | ICD-10-CM

## 2020-07-20 PROCEDURE — 99283 EMERGENCY DEPT VISIT LOW MDM: CPT

## 2020-07-20 PROCEDURE — 99284 EMERGENCY DEPT VISIT MOD MDM: CPT | Performed by: EMERGENCY MEDICINE

## 2020-07-20 RX ORDER — CYCLOBENZAPRINE HCL 10 MG
10 TABLET ORAL ONCE
Status: COMPLETED | OUTPATIENT
Start: 2020-07-20 | End: 2020-07-20

## 2020-07-20 RX ORDER — TRAMADOL HYDROCHLORIDE 50 MG/1
50 TABLET ORAL ONCE
Status: COMPLETED | OUTPATIENT
Start: 2020-07-20 | End: 2020-07-20

## 2020-07-20 RX ORDER — CYCLOBENZAPRINE HCL 10 MG
10 TABLET ORAL 2 TIMES DAILY PRN
Qty: 10 TABLET | Refills: 0 | Status: SHIPPED | OUTPATIENT
Start: 2020-07-20 | End: 2020-09-15

## 2020-07-20 RX ORDER — TRAMADOL HYDROCHLORIDE 50 MG/1
TABLET ORAL
Qty: 15 TABLET | Refills: 0 | Status: SHIPPED | OUTPATIENT
Start: 2020-07-20 | End: 2020-09-15

## 2020-07-20 RX ORDER — PREDNISONE 10 MG/1
TABLET ORAL
Qty: 20 TABLET | Refills: 0 | Status: SHIPPED | OUTPATIENT
Start: 2020-07-20 | End: 2020-09-15

## 2020-07-20 RX ADMIN — CYCLOBENZAPRINE HYDROCHLORIDE 10 MG: 10 TABLET, FILM COATED ORAL at 19:07

## 2020-07-20 RX ADMIN — TRAMADOL HYDROCHLORIDE 50 MG: 50 TABLET, COATED ORAL at 19:07

## 2020-07-20 RX ADMIN — PREDNISONE 50 MG: 20 TABLET ORAL at 19:07

## 2020-07-20 NOTE — ED PROVIDER NOTES
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 Taking?   gabapentin (NEURONTIN) 100 mg capsule   No No   Sig: Take 1 capsule (100 mg total) by mouth 3 (three) times a day   naproxen (NAPROSYN) 500 mg tablet   No No   Sig: Take 1 tablet (500 mg total) by mouth 2 (two) times a day with meals for 14 days      Facility-Administered Medications: None       Past Medical History:   Diagnosis Date    Anxiety     Bowel obstruction (HCC)     Clostridium difficile colitis 9/13/2018    Colitis     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[facility] [address] ) 9/13/2018    Pancreatitis        Past Surgical History:   Procedure Laterality Date    COLECTOMY TOTAL      with ileal pouch and anastemosis    TOTAL COLECTOMY         History reviewed  No pertinent family history  I have reviewed and agree with the history as documented  E-Cigarette/Vaping    E-Cigarette Use Never User      E-Cigarette/Vaping Substances     Social History     Tobacco Use    Smoking status: Never Smoker    Smokeless tobacco: Never Used   Substance Use Topics    Alcohol use: Yes     Alcohol/week: 3 0 standard drinks     Types: 3 Standard drinks or equivalent per week     Frequency: 2-4 times a month     Comment:  weekly    Drug use: No       Review of Systems   Constitutional: Negative for fever  Gastrointestinal: Negative for constipation, diarrhea and vomiting  Musculoskeletal: Positive for back pain and gait problem  Physical Exam  Physical Exam   Constitutional: He is oriented to person, place, and time  He appears well-developed and well-nourished  He appears distressed  Appears in pain   HENT:   Head: Normocephalic and atraumatic  Neck: Neck supple  Cardiovascular: Normal rate, regular rhythm and normal heart sounds  Pulmonary/Chest: Effort normal and breath sounds normal    Musculoskeletal: Normal range of motion  He exhibits no edema  Neurological: He is alert and oriented to person, place, and time  He displays normal reflexes  He exhibits normal muscle tone  Skin: Skin is warm and dry  Psychiatric: He has a normal mood and affect  His behavior is normal    Nursing note and vitals reviewed  Vital Signs  ED Triage Vitals [07/20/20 1833]   Temperature Pulse Respirations Blood Pressure SpO2   99 1 °F (37 3 °C) 85 18 140/86 95 %      Temp Source Heart Rate Source Patient Position - Orthostatic VS BP Location FiO2 (%)   Tympanic Monitor Lying Right arm --      Pain Score       --           Vitals:    07/20/20 1833   BP: 140/86   Pulse: 85   Patient Position - Orthostatic VS: Lying         Visual Acuity      ED Medications  Medications   predniSONE tablet 50 mg (has no administration in time range)   cyclobenzaprine (FLEXERIL) tablet 10 mg (has no administration in time range)   traMADol (ULTRAM) tablet 50 mg (has no administration in time range)       Diagnostic Studies  Results Reviewed     None                 No orders to display              Procedures  Procedures         ED Course                                             MDM  Number of Diagnoses or Management Options  Sciatica:   Strain of lumbar region, initial encounter:   Diagnosis management comments: Records and prescription monitoring program reviewed  Will tx with course of prednisone, flexeril/ultram prn  Pt  Followed by specialist at [facility]  Disposition  Final diagnoses:   Strain of lumbar region, initial encounter   Sciatica     Time reflects when diagnosis was documented in both MDM as applicable and the Disposition within this note     Time User Action Codes Description Comment    7/82/7459  2:16 PM Alicia Lundberg Add [P24 771Q] Strain of lumbar region, initial encounter     8/18/2832  5:90 PM Alicia Lundberg Add [T36 13] Sciatica       ED Disposition     ED Disposition Condition Date/Time Comment    Discharge Stable Mon Jul 20, 2020  6:46 PM China Vázquez discharge to home/self care  Follow-up Information     Follow up With Specialties Details Why Contact Info    your doctor              Patient's Medications   Discharge Prescriptions    CYCLOBENZAPRINE (FLEXERIL) 10 MG TABLET    Take 1 tablet (10 mg total) by mouth 2 (two) times a day as needed for muscle spasms       Start Date: 7/20/2020 End Date: --       Order Dose: 10 mg       Quantity: 10 tablet    Refills: 0    PREDNISONE 10 MG TABLET    4 po daily x2 days, then 3 po daily x2 days, then 2 po daily x2 days,then 1 po daily x2days       Start Date: 7/20/2020 End Date: --       Order Dose: --       Quantity: 20 tablet    Refills: 0    TRAMADOL (ULTRAM) 50 MG TABLET    1-2 po q 6 hours prn pain       Start Date: 7/20/2020 End Date: --       Order Dose: --       Quantity: 15 tablet    Refills: 0     No discharge procedures on file      PDMP Review     None          ED Provider  Electronically Signed by           Catherine Michelle MD  08/56/07 9543

## 2020-07-21 ENCOUNTER — VBI (OUTPATIENT)
Dept: FAMILY MEDICINE CLINIC | Facility: CLINIC | Age: 25
End: 2020-07-21

## 2020-07-21 NOTE — TELEPHONE ENCOUNTER
Carlie Delvalle    ED Visit Information     Ed visit date: 7/20/20  Diagnosis Description:backpain  In Network? Yes Hector Barros  Discharge status: Home  Discharged with meds ? Yes  Number of ED visits to date: 3  ED Severity:1     Outreach Information    Outreach successful: Yes 1  Date letter mailedn/a  Date Finalized:7/21/2020    Care Coordination    appt scheduled at [address] [place] [address] issues ?  No    Value Consolidated Joo

## 2020-07-26 ENCOUNTER — HOSPITAL ENCOUNTER (EMERGENCY)
Facility: HOSPITAL | Age: 25
Discharge: HOME | End: 2020-07-26
Attending: EMERGENCY MEDICINE | Admitting: EMERGENCY MEDICINE
Payer: COMMERCIAL

## 2020-07-26 ENCOUNTER — APPOINTMENT (EMERGENCY)
Dept: RADIOLOGY | Facility: HOSPITAL | Age: 25
End: 2020-07-26
Payer: COMMERCIAL

## 2020-07-26 VITALS
SYSTOLIC BLOOD PRESSURE: 119 MMHG | RESPIRATION RATE: 16 BRPM | HEART RATE: 82 BPM | TEMPERATURE: 96.9 F | DIASTOLIC BLOOD PRESSURE: 56 MMHG | OXYGEN SATURATION: 99 %

## 2020-07-26 DIAGNOSIS — M62.838 CERVICAL PARASPINAL MUSCLE SPASM: Primary | ICD-10-CM

## 2020-07-26 DIAGNOSIS — M54.16 LUMBAR RADICULOPATHY: ICD-10-CM

## 2020-07-26 PROCEDURE — 36415 COLL VENOUS BLD VENIPUNCTURE: CPT | Performed by: PHYSICIAN ASSISTANT

## 2020-07-26 PROCEDURE — 99284 EMERGENCY DEPT VISIT MOD MDM: CPT

## 2020-07-26 PROCEDURE — 93005 ELECTROCARDIOGRAM TRACING: CPT

## 2020-07-26 PROCEDURE — 86618 LYME DISEASE ANTIBODY: CPT | Performed by: PHYSICIAN ASSISTANT

## 2020-07-26 PROCEDURE — 99285 EMERGENCY DEPT VISIT HI MDM: CPT | Performed by: PHYSICIAN ASSISTANT

## 2020-07-26 PROCEDURE — 72125 CT NECK SPINE W/O DYE: CPT

## 2020-07-26 PROCEDURE — 96372 THER/PROPH/DIAG INJ SC/IM: CPT

## 2020-07-26 RX ORDER — IBUPROFEN 800 MG/1
800 TABLET ORAL EVERY 6 HOURS PRN
Qty: 30 TABLET | Refills: 0 | Status: SHIPPED | OUTPATIENT
Start: 2020-07-26 | End: 2021-02-17

## 2020-07-26 RX ORDER — OXYCODONE HYDROCHLORIDE 5 MG/1
5 TABLET ORAL EVERY 6 HOURS PRN
Qty: 12 TABLET | Refills: 0 | Status: SHIPPED | OUTPATIENT
Start: 2020-07-26 | End: 2020-07-29

## 2020-07-26 RX ORDER — ACETAMINOPHEN 325 MG/1
650 TABLET ORAL EVERY 6 HOURS PRN
Qty: 30 TABLET | Refills: 0 | Status: SHIPPED | OUTPATIENT
Start: 2020-07-26 | End: 2020-11-13 | Stop reason: ALTCHOICE

## 2020-07-26 RX ORDER — OXYCODONE HYDROCHLORIDE 5 MG/1
5 TABLET ORAL ONCE
Status: COMPLETED | OUTPATIENT
Start: 2020-07-26 | End: 2020-07-26

## 2020-07-26 RX ORDER — KETOROLAC TROMETHAMINE 30 MG/ML
15 INJECTION, SOLUTION INTRAMUSCULAR; INTRAVENOUS ONCE
Status: COMPLETED | OUTPATIENT
Start: 2020-07-26 | End: 2020-07-26

## 2020-07-26 RX ORDER — IBUPROFEN 200 MG
1000 TABLET ORAL EVERY 6 HOURS PRN
COMMUNITY
End: 2020-11-13 | Stop reason: ALTCHOICE

## 2020-07-26 RX ADMIN — KETOROLAC TROMETHAMINE 15 MG: 30 INJECTION, SOLUTION INTRAMUSCULAR at 10:47

## 2020-07-26 RX ADMIN — OXYCODONE HYDROCHLORIDE 5 MG: 5 TABLET ORAL at 11:18

## 2020-07-26 NOTE — ED PROVIDER NOTES
History  Chief Complaint   Patient presents with    Pain     patient c/o pain over whole body - states he is unable to move his right leg and the pain is severe across his neck and chest   states has had pain off and on since an injury last November, but it was exacerbated after a recent work injury  seen here last week  29 y/o male presenting with neck pain and lower back pain that radiates down the right leg over the past week  Was seen here on the 20th for similar symptoms  States he has an appointment tomorrow at [facility] with ortho to assess if he is eligible for spinal injections and also has an upcoming EMG  Patient relays he has had neck issues since an injury at work back in November has been seen by [facility] orth[facility] and currently has a   States that his pain worsened last week 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 and had worsening lower back pain  Awoke this morning with neck pain and spasming  He is also experiencing some chest tightness as well  No falls or other injuries  No recent illnesses  Patient was placed on flexeril, tramadol and prednisone  Relays prednisone seemed to work at first however no longer is  Taking ibuprofen however has not take flexeril  Denies fevers, IV drug use, nausea, vomiting, diarrhea, numbness, paresthesias, saddle anesthesias, bladder bowel retention or incontinence, weakness, abdominal pain, shortness of breath, cough  Prior to Admission Medications   Prescriptions Last Dose Informant Patient Reported? Taking?    cyclobenzaprine (FLEXERIL) 10 mg tablet   No No   Sig: Take 1 tablet (10 mg total) by mouth 2 (two) times a day as needed for muscle spasms   gabapentin (NEURONTIN) 100 mg capsule   No No   Sig: Take 1 capsule (100 mg total) by mouth 3 (three) times a day   ibuprofen (MOTRIN) 200 mg tablet 7/25/2020 at Unknown time  Yes Yes   Sig: Take 1,000 mg by mouth every 6 (six) hours as needed for mild pain   naproxen (NAPROSYN) 500 mg tablet   No No   Sig: Take 1 tablet (500 mg total) by mouth 2 (two) times a day with meals for 14 days   predniSONE 10 mg tablet   No Yes   Si po daily x2 days, then 3 po daily x2 days, then 2 po daily x2 days,then 1 po daily x2days   traMADol (ULTRAM) 50 mg tablet Past Week at Unknown time  No Yes   Si-2 po q 6 hours prn pain      Facility-Administered Medications: None       Past Medical History:   Diagnosis Date    Anxiety     Bowel obstruction (HCC)     Clostridium difficile colitis 2018    Colitis     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[facility] [address] ) 2018    Pancreatitis        Past Surgical History:   Procedure Laterality Date    COLECTOMY TOTAL      with ileal pouch and anastemosis    TOTAL COLECTOMY         History reviewed  No pertinent family history  I have reviewed and agree with the history as documented  E-Cigarette/Vaping    E-Cigarette Use Never User      E-Cigarette/Vaping Substances     Social History     Tobacco Use    Smoking status: Never Smoker    Smokeless tobacco: Never Used   Substance Use Topics    Alcohol use: Yes     Alcohol/week: 3 0 standard drinks     Types: 3 Standard drinks or equivalent per week     Frequency: 2-4 times a month     Comment:  weekly    Drug use: No       Review of Systems   Constitutional: Negative  Negative for chills, fever and unexpected weight change  Denies IV drug use     HENT: Negative  Eyes: Negative  Respiratory: Negative  Negative for cough, chest tightness, shortness of breath and wheezing  Cardiovascular: Negative  Negative for chest pain and palpitations  Gastrointestinal: Negative  Negative for abdominal pain, constipation, diarrhea, nausea and vomiting  Genitourinary: Negative  Negative for difficulty urinating, dysuria, flank pain, frequency, hematuria and urgency  Denies numbness, tingling in the groin  Musculoskeletal: Positive for back pain and neck pain   Negative for arthralgias, gait problem, joint swelling, myalgias and neck stiffness  Skin: Negative  Negative for color change  Neurological: Negative  Negative for dizziness, tremors, weakness, light-headedness, numbness and headaches  All other systems reviewed and are negative  Physical Exam  Physical Exam   Constitutional: He is oriented to person, place, and time  He appears distressed  HENT:   Head: Normocephalic and atraumatic  Nose: Nose normal    Mouth/Throat: No oropharyngeal exudate  Eyes: Pupils are equal, round, and reactive to light  Conjunctivae and EOM are normal  Right eye exhibits no discharge  Left eye exhibits no discharge  No scleral icterus  Neck: Neck supple  Cardiovascular: Normal rate, regular rhythm, normal heart sounds and intact distal pulses  Exam reveals no gallop and no friction rub  No murmur heard  Pulmonary/Chest: Effort normal and breath sounds normal  No stridor  No respiratory distress  He has no wheezes  He has no rales  He exhibits no tenderness  S PO2 is 98 % indicating adequate oxygenation   Abdominal: Soft  Bowel sounds are normal  He exhibits no distension and no mass  There is no tenderness  There is no rebound and no guarding  No hernia  Musculoskeletal:        Arms:       Legs:  Lymphadenopathy:     He has no cervical adenopathy  Neurological: He is alert and oriented to person, place, and time  Skin: Skin is warm and dry  Capillary refill takes less than 2 seconds  No rash noted  He is not diaphoretic  No erythema  No pallor  Psychiatric: He has a normal mood and affect  Nursing note and vitals reviewed        Vital Signs  ED Triage Vitals [07/26/20 1007]   Temperature Pulse Respirations Blood Pressure SpO2   (!) 96 9 °F (36 1 °C) 84 18 158/97 98 %      Temp Source Heart Rate Source Patient Position - Orthostatic VS BP Location FiO2 (%)   Tympanic Monitor Sitting Left arm --      Pain Score       Worst Possible Pain           Vitals:    07/26/20 1007   BP: 158/97   Pulse: 84   Patient Position - Orthostatic VS: Sitting         Visual Acuity      ED Medications  Medications   oxyCODONE (ROXICODONE) IR tablet 5 mg (has no administration in time range)   ketorolac (TORADOL) injection 15 mg (15 mg Intramuscular Given 7/26/20 1047)       Diagnostic Studies  Results Reviewed     Procedure Component Value Units Date/Time    Lyme Antibody Profile with reflex to [facility] [[id number]] Collected:  07/26/20 1053    Lab Status: In process Specimen:  Blood from Arm, Right Updated:  07/26/20 1056                 CT cervical spine without contrast   Final Result by Alan Buckner MD (07/26 1055)      Minor, noncompressive degenerative changes of the cervical spine  Workstation performed: [id number]                    Procedures  ECG 12 Lead Documentation Only  Date/Time: 7/26/2020 11:08 AM  Performed by: Nahid Powell PA-C  Authorized by: Nahid Powell PA-C     Indications / Diagnosis:  Chest pain   ECG reviewed by me, the ED Provider: yes    Patient location:  ED  Interpretation:     Interpretation: normal    Rate:     ECG rate:  80    ECG rate assessment: normal    Rhythm:     Rhythm: sinus rhythm    Ectopy:     Ectopy: none    QRS:     QRS axis:  Normal    QRS intervals:  Normal  Conduction:     Conduction: normal    ST segments:     ST segments:  Normal  T waves:     T waves: normal               ED Course  ED Course as of Jul 26 1108   Sun Jul 26, 2020   1103 Patient continues with pain          US AUDIT      Most Recent Value   Initial Alcohol Screen: US AUDIT-C    1  How often do you have a drink containing alcohol? 2 Filed at: 07/26/2020 1007   2  How many drinks containing alcohol do you have on a typical day you are drinking? 1 Filed at: 07/26/2020 1007   3a  Male UNDER 65: How often do you have five or more drinks on one occasion?   0 Filed at: 07/26/2020 1007   Audit-C Score  3 Filed at: 07/26/2020 1007                  ROD/DAST-10      Most Recent Value   How many times in the past year have you    Used an illegal drug or used a prescription medication for non-medical reasons? Never Filed at: 07/26/2020 1008                                MDM  Number of Diagnoses or Management Options  Cervical paraspinal muscle spasm:   Lumbar radiculopathy:   Diagnosis management comments: Patient has been seen at multiple hospital systems and currently undergoing workmans comp and seeing [facility]  Patient researched on [place]  [facility] and recent scripts consistent with tramadol prescribed here without other narcotic scripts filled  Will prescribe ibuprofen and Tylenol as well as oxycodone for severe breakthrough pain, informed not to drive or operate heavy machinery while taking this medication  Patient also has Flexeril at home and advised not to take this medication in conjunction with oxycodone  Patient states he has had MRIs at that time of his entire spine however I do not see any these results orders in our system  He has had similar symptoms before in the past received a CT a dissection study in January  Patient is informed to return to the emergency department for worsening of symptoms and was given proper education regarding their diagnosis and symptoms  Otherwise the patient is informed to follow up with their primary care doctor for re-evaluation  The patient verbalizes understanding and agrees with above assessment and plan  All questions were answered  Please Note: Fluency Direct voice recognition software may have been used in the creation of this document  Wrong words or sound a like substitutions may have occurred due to the inherent limitations of the voice software             Amount and/or Complexity of Data Reviewed  Clinical lab tests: ordered  Tests in the radiology section of CPT®: reviewed and ordered  Review and summarize past medical records: yes  Discuss the patient with other providers: yes (Discussed case with Dr Sharyle Profit )  Independent visualization of images, tracings, or specimens: yes          Disposition  Final diagnoses:   Cervical paraspinal muscle spasm   Lumbar radiculopathy     Time reflects when diagnosis was documented in both MDM as applicable and the Disposition within this note     Time User Action Codes Description Comment    7/26/2020 11:05 AM Townsend Schlatter Add [B48 666] Cervical paraspinal muscle spasm     7/26/2020 11:05 AM Townsend Schlatter Add [M54 16] Lumbar radiculopathy       ED Disposition     ED Disposition Condition Date/Time Comment    Discharge Stable Sun Jul 26, 2020 11:05 AM Michelle Watson discharge to home/self care  Follow-up Information     Follow up With Specialties Details Why Contact Info Additional Information    [facility] Emergency Medicine Go to  If symptoms worsen such as numbness or tingling in the groin, bladder or bowel retention or incontinence  [address] [facility], [place], [place], [address]          Patient's Medications   Discharge Prescriptions    ACETAMINOPHEN (TYLENOL) 325 MG TABLET    Take 2 tablets (650 mg total) by mouth every 6 (six) hours as needed for mild pain       Start Date: 7/26/2020 End Date: --       Order Dose: 650 mg       Quantity: 30 tablet    Refills: 0    IBUPROFEN (MOTRIN) 800 MG TABLET    Take 1 tablet (800 mg total) by mouth every 6 (six) hours as needed for moderate pain for up to 10 days       Start Date: 7/26/2020 End Date: 8/5/2020       Order Dose: 800 mg       Quantity: 30 tablet    Refills: 0    OXYCODONE (ROXICODONE) 5 MG IMMEDIATE RELEASE TABLET    Take 1 tablet (5 mg total) by mouth every 6 (six) hours as needed for moderate pain for up to 3 daysMax Daily Amount: 20 mg       Start Date: 7/26/2020 End Date: 7/29/2020       Order Dose: 5 mg       Quantity: 12 tablet    Refills: 0     No discharge procedures on file      PDMP Review     None ED Provider  Electronically Signed by           Nahid Powell PA-C  07/26/20 1106

## 2020-07-28 LAB
ATRIAL RATE: 80 BPM
B BURGDOR IGG+IGM SER-ACNC: <0.91 ISR (ref 0–0.9)
P AXIS: 57 DEGREES
PR INTERVAL: 168 MS
QRS AXIS: 1 DEGREES
QRSD INTERVAL: 90 MS
QT INTERVAL: 374 MS
QTC INTERVAL: 431 MS
T WAVE AXIS: 26 DEGREES
VENTRICULAR RATE: 80 BPM

## 2020-07-28 PROCEDURE — 93010 ELECTROCARDIOGRAM REPORT: CPT | Performed by: INTERNAL MEDICINE

## 2020-09-14 PROBLEM — K92.2 GI BLEED: Status: RESOLVED | Noted: 2019-01-05 | Resolved: 2020-09-14

## 2020-09-14 PROBLEM — M53.3 SACROILIAC JOINT DYSFUNCTION OF RIGHT SIDE: Status: ACTIVE | Noted: 2020-07-27

## 2020-09-14 RX ORDER — NAPROXEN 500 MG/1
500 TABLET ORAL 3 TIMES DAILY
Status: ON HOLD | COMMUNITY
Start: 2020-01-12 | End: 2022-01-04 | Stop reason: CLARIF

## 2020-09-15 ENCOUNTER — OFFICE VISIT (OUTPATIENT)
Dept: FAMILY MEDICINE CLINIC | Facility: CLINIC | Age: 25
End: 2020-09-15
Payer: COMMERCIAL

## 2020-09-15 VITALS
HEART RATE: 118 BPM | HEIGHT: 69 IN | RESPIRATION RATE: 18 BRPM | SYSTOLIC BLOOD PRESSURE: 120 MMHG | TEMPERATURE: 97.2 F | OXYGEN SATURATION: 98 % | WEIGHT: 184.2 LBS | DIASTOLIC BLOOD PRESSURE: 84 MMHG | BODY MASS INDEX: 27.28 KG/M2

## 2020-09-15 DIAGNOSIS — K51.018 ULCERATIVE CHRONIC PANCOLITIS WITH OTHER COMPLICATION (HCC): ICD-10-CM

## 2020-09-15 DIAGNOSIS — M25.50 ARTHRALGIA, UNSPECIFIED JOINT: ICD-10-CM

## 2020-09-15 DIAGNOSIS — F41.1 GAD (GENERALIZED ANXIETY DISORDER): Primary | ICD-10-CM

## 2020-09-15 DIAGNOSIS — Z23 NEED FOR INFLUENZA VACCINATION: ICD-10-CM

## 2020-09-15 DIAGNOSIS — K91.858: ICD-10-CM

## 2020-09-15 PROCEDURE — 90471 IMMUNIZATION ADMIN: CPT

## 2020-09-15 PROCEDURE — 99214 OFFICE O/P EST MOD 30 MIN: CPT | Performed by: FAMILY MEDICINE

## 2020-09-15 PROCEDURE — 90686 IIV4 VACC NO PRSV 0.5 ML IM: CPT

## 2020-09-15 PROCEDURE — 1036F TOBACCO NON-USER: CPT | Performed by: FAMILY MEDICINE

## 2020-09-15 RX ORDER — NAPROXEN 500 MG/1
TABLET ORAL
COMMUNITY
Start: 2020-09-15 | End: 2020-09-15 | Stop reason: SDUPTHER
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
## 2020-09-16 ENCOUNTER — TELEPHONE (OUTPATIENT)
Dept: FAMILY MEDICINE CLINIC | Facility: CLINIC | Age: 25
End: 2020-09-16

## 2020-09-16 NOTE — TELEPHONE ENCOUNTER
Primary insurance shows [employer]  The other Dr Narendra Lazo needs to choose a Personal/Family (P/F) gaurantor account and they will be able to apply his personal insurance to whatever visit they are billing  They should not choose the [facility] gaurantor account which bills to [facility]      Patients chart is not incorrect

## 2020-09-16 NOTE — TELEPHONE ENCOUNTER
Pt called stating that he would like to move forward with the rheumatologist  Pt would also like a call back from you to discuss this   Pls advise

## 2020-09-16 NOTE — TELEPHONE ENCOUNTER
Pt relates he called a rheumatologist (Dr Deonte Evans, not part of [facility]) and they were hesitant to see him because somehow they see his primary insurance is Workers [employer]  The conditions he is being sent to the rheumatologist for are not WC related  I did look in his chart and WC is listed as primary insurance still    I advised pt to call  here and see if they can update his chart to show his primary insurance is not WC  I also offered him a letter, if requested, stating a rheumatologist eval is recommended for non-WC symptoms/conditions  87F w/ hypertension, atrial fibrillation (on eliquis), hypothyroidism, glaucoma, and retinitis pigmentosa who presents with L femoral neck fracture, S/p left hip kim-arthoplasty, POD#2.

## 2020-10-30 ENCOUNTER — TELEMEDICINE (OUTPATIENT)
Dept: FAMILY MEDICINE CLINIC | Facility: CLINIC | Age: 25
End: 2020-10-30
Payer: COMMERCIAL

## 2020-10-30 VITALS — HEIGHT: 69 IN | BODY MASS INDEX: 26.66 KG/M2 | WEIGHT: 180 LBS

## 2020-10-30 DIAGNOSIS — Z20.828 EXPOSURE TO SARS-ASSOCIATED CORONAVIRUS: ICD-10-CM

## 2020-10-30 DIAGNOSIS — Z20.828 EXPOSURE TO SARS-ASSOCIATED CORONAVIRUS: Primary | ICD-10-CM

## 2020-10-30 PROCEDURE — 3725F SCREEN DEPRESSION PERFORMED: CPT | Performed by: FAMILY MEDICINE

## 2020-10-30 PROCEDURE — 99213 OFFICE O/P EST LOW 20 MIN: CPT | Performed by: FAMILY MEDICINE

## 2020-10-30 PROCEDURE — U0003 INFECTIOUS AGENT DETECTION BY NUCLEIC ACID (DNA OR RNA); SEVERE ACUTE RESPIRATORY SYNDROME CORONAVIRUS 2 (SARS-COV-2) (CORONAVIRUS DISEASE [COVID-19]), AMPLIFIED PROBE TECHNIQUE, MAKING USE OF HIGH THROUGHPUT TECHNOLOGIES AS DESCRIBED BY CMS-2020-01-R: HCPCS | Performed by: FAMILY MEDICINE

## 2020-11-01 LAB — SARS-COV-2 RNA SPEC QL NAA+PROBE: NOT DETECTED

## 2020-11-02 ENCOUNTER — TELEPHONE (OUTPATIENT)
Dept: FAMILY MEDICINE CLINIC | Facility: CLINIC | Age: 25
End: 2020-11-02

## 2020-11-02 ENCOUNTER — TELEMEDICINE (OUTPATIENT)
Dept: FAMILY MEDICINE CLINIC | Facility: CLINIC | Age: 25
End: 2020-11-02
Payer: COMMERCIAL

## 2020-11-02 VITALS — HEIGHT: 69 IN | BODY MASS INDEX: 26.66 KG/M2 | WEIGHT: 180 LBS

## 2020-11-02 DIAGNOSIS — Z20.822 EXPOSURE TO COVID-19 VIRUS: Primary | ICD-10-CM

## 2020-11-02 PROCEDURE — 99213 OFFICE O/P EST LOW 20 MIN: CPT | Performed by: FAMILY MEDICINE

## 2020-11-13 ENCOUNTER — OFFICE VISIT (OUTPATIENT)
Dept: FAMILY MEDICINE CLINIC | Facility: CLINIC | Age: 25
End: 2020-11-13
Payer: COMMERCIAL

## 2020-11-13 VITALS
BODY MASS INDEX: 27.85 KG/M2 | WEIGHT: 188 LBS | SYSTOLIC BLOOD PRESSURE: 126 MMHG | RESPIRATION RATE: 16 BRPM | TEMPERATURE: 97 F | HEART RATE: 84 BPM | DIASTOLIC BLOOD PRESSURE: 72 MMHG | HEIGHT: 69 IN

## 2020-11-13 DIAGNOSIS — S16.1XXA STRAIN OF STERNOCLEIDOMASTOID MUSCLE, INITIAL ENCOUNTER: Primary | ICD-10-CM

## 2020-11-13 PROCEDURE — 99213 OFFICE O/P EST LOW 20 MIN: CPT | Performed by: NURSE PRACTITIONER

## 2020-11-13 PROCEDURE — 1036F TOBACCO NON-USER: CPT | Performed by: NURSE PRACTITIONER

## 2020-11-13 PROCEDURE — 3008F BODY MASS INDEX DOCD: CPT | Performed by: NURSE PRACTITIONER

## 2020-11-13 RX ORDER — PREDNISONE 10 MG/1
TABLET ORAL
Qty: 20 TABLET | Refills: 0 | Status: SHIPPED | OUTPATIENT
Start: 2020-11-13 | End: 2020-11-23

## 2020-11-13 RX ORDER — CYCLOBENZAPRINE HCL 10 MG
10 TABLET ORAL
Qty: 15 TABLET | Refills: 0 | Status: SHIPPED | OUTPATIENT
Start: 2020-11-13 | End: 2021-02-17

## 2020-11-28 ENCOUNTER — HOSPITAL ENCOUNTER (EMERGENCY)
Facility: HOSPITAL | Age: 25
Discharge: HOME | End: 2020-11-28
Attending: EMERGENCY MEDICINE | Admitting: EMERGENCY MEDICINE
Payer: COMMERCIAL

## 2020-11-28 ENCOUNTER — APPOINTMENT (EMERGENCY)
Dept: RADIOLOGY | Facility: HOSPITAL | Age: 25
End: 2020-11-28
Payer: COMMERCIAL

## 2020-11-28 VITALS
RESPIRATION RATE: 16 BRPM | OXYGEN SATURATION: 100 % | DIASTOLIC BLOOD PRESSURE: 80 MMHG | TEMPERATURE: 97.9 F | SYSTOLIC BLOOD PRESSURE: 138 MMHG | HEART RATE: 73 BPM

## 2020-11-28 DIAGNOSIS — R51.9 HEADACHE: ICD-10-CM

## 2020-11-28 DIAGNOSIS — R11.0 NAUSEA: ICD-10-CM
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
## 2020-11-28 LAB
ANION GAP SERPL CALCULATED.3IONS-SCNC: 9 MMOL/L (ref 4–13)
BASOPHILS # BLD AUTO: 0.09 THOUSANDS/ΜL (ref 0–0.1)
BASOPHILS NFR BLD AUTO: 1 % (ref 0–1)
BUN SERPL-MCNC: 11 MG/DL (ref 5–25)
CALCIUM SERPL-MCNC: 8.9 MG/DL (ref 8.3–10.1)
CHLORIDE SERPL-SCNC: 101 MMOL/L (ref 100–108)
CO2 SERPL-SCNC: 27 MMOL/L (ref 21–32)
CREAT SERPL-MCNC: 1.08 MG/DL (ref 0.6–1.3)
CRP SERPL QL: 12.7 MG/L
EOSINOPHIL # BLD AUTO: 0.25 THOUSAND/ΜL (ref 0–0.61)
EOSINOPHIL NFR BLD AUTO: 2 % (ref 0–6)
ERYTHROCYTE [DISTWIDTH] IN BLOOD BY AUTOMATED COUNT: 17.1 % (ref 11.6–15.1)
ERYTHROCYTE [SEDIMENTATION RATE] IN BLOOD: 28 MM/HOUR (ref 0–14)
GFR SERPL CREATININE-BSD FRML MDRD: 94 ML/MIN/1.73SQ M
GLUCOSE SERPL-MCNC: 78 MG/DL (ref 65–140)
HCT VFR BLD AUTO: 41.7 % (ref 36.5–49.3)
HGB BLD-MCNC: 12.2 G/DL (ref 12–17)
IMM GRANULOCYTES # BLD AUTO: 0.04 THOUSAND/UL (ref 0–0.2)
IMM GRANULOCYTES NFR BLD AUTO: 0 % (ref 0–2)
LYMPHOCYTES # BLD AUTO: 1.36 THOUSANDS/ΜL (ref 0.6–4.47)
LYMPHOCYTES NFR BLD AUTO: 12 % (ref 14–44)
MAGNESIUM SERPL-MCNC: 2 MG/DL (ref 1.6–2.6)
MCH RBC QN AUTO: 18.9 PG (ref 26.8–34.3)
MCHC RBC AUTO-ENTMCNC: 29.3 G/DL (ref 31.4–37.4)
MCV RBC AUTO: 65 FL (ref 82–98)
MONOCYTES # BLD AUTO: 1 THOUSAND/ΜL (ref 0.17–1.22)
MONOCYTES NFR BLD AUTO: 9 % (ref 4–12)
NEUTROPHILS # BLD AUTO: 8.55 THOUSANDS/ΜL (ref 1.85–7.62)
NEUTS SEG NFR BLD AUTO: 76 % (ref 43–75)
NRBC BLD AUTO-RTO: 0 /100 WBCS
PLATELET # BLD AUTO: 389 THOUSANDS/UL (ref 149–390)
PMV BLD AUTO: 9 FL (ref 8.9–12.7)
POTASSIUM SERPL-SCNC: 4.4 MMOL/L (ref 3.5–5.3)
RBC # BLD AUTO: 6.47 MILLION/UL (ref 3.88–5.62)
SODIUM SERPL-SCNC: 137 MMOL/L (ref 136–145)
WBC # BLD AUTO: 11.29 THOUSAND/UL (ref 4.31–10.16)

## 2020-11-28 PROCEDURE — 83735 ASSAY OF MAGNESIUM: CPT | Performed by: EMERGENCY MEDICINE

## 2020-11-28 PROCEDURE — 85025 COMPLETE CBC W/AUTO DIFF WBC: CPT | Performed by: EMERGENCY MEDICINE

## 2020-11-28 PROCEDURE — 99285 EMERGENCY DEPT VISIT HI MDM: CPT | Performed by: EMERGENCY MEDICINE

## 2020-11-28 PROCEDURE — 70450 CT HEAD/BRAIN W/O DYE: CPT

## 2020-11-28 PROCEDURE — 96375 TX/PRO/DX INJ NEW DRUG ADDON: CPT

## 2020-11-28 PROCEDURE — G1004 CDSM NDSC: HCPCS

## 2020-11-28 PROCEDURE — 96361 HYDRATE IV INFUSION ADD-ON: CPT

## 2020-11-28 PROCEDURE — 99284 EMERGENCY DEPT VISIT MOD MDM: CPT

## 2020-11-28 PROCEDURE — 93005 ELECTROCARDIOGRAM TRACING: CPT

## 2020-11-28 PROCEDURE — 80048 BASIC METABOLIC PNL TOTAL CA: CPT | Performed by: EMERGENCY MEDICINE

## 2020-11-28 PROCEDURE — 85652 RBC SED RATE AUTOMATED: CPT | Performed by: EMERGENCY MEDICINE

## 2020-11-28 PROCEDURE — 36415 COLL VENOUS BLD VENIPUNCTURE: CPT | Performed by: EMERGENCY MEDICINE

## 2020-11-28 PROCEDURE — 86140 C-REACTIVE PROTEIN: CPT | Performed by: EMERGENCY MEDICINE

## 2020-11-28 PROCEDURE — 96374 THER/PROPH/DIAG INJ IV PUSH: CPT

## 2020-11-28 RX ORDER — KETOROLAC TROMETHAMINE 30 MG/ML
15 INJECTION, SOLUTION INTRAMUSCULAR; INTRAVENOUS ONCE
Status: COMPLETED | OUTPATIENT
Start: 2020-11-28 | End: 2020-11-28

## 2020-11-28 RX ORDER — METOCLOPRAMIDE 10 MG/1
10 TABLET ORAL EVERY 6 HOURS
Qty: 10 TABLET | Refills: 0 | Status: SHIPPED | OUTPATIENT
Start: 2020-11-28 | End: 2020-12-01

## 2020-11-28 RX ORDER — METOCLOPRAMIDE HYDROCHLORIDE 5 MG/ML
10 INJECTION INTRAMUSCULAR; INTRAVENOUS ONCE
Status: COMPLETED | OUTPATIENT
Start: 2020-11-28 | End: 2020-11-28

## 2020-11-28 RX ORDER — DIPHENHYDRAMINE HYDROCHLORIDE 50 MG/ML
25 INJECTION INTRAMUSCULAR; INTRAVENOUS ONCE
Status: COMPLETED | OUTPATIENT
Start: 2020-11-28 | End: 2020-11-28

## 2020-11-28 RX ADMIN — SODIUM CHLORIDE 1000 ML: 0.9 INJECTION, SOLUTION INTRAVENOUS at 14:47

## 2020-11-28 RX ADMIN — KETOROLAC TROMETHAMINE 15 MG: 30 INJECTION, SOLUTION INTRAMUSCULAR at 14:48

## 2020-11-28 RX ADMIN — DIPHENHYDRAMINE HYDROCHLORIDE 25 MG: 50 INJECTION, SOLUTION INTRAMUSCULAR; INTRAVENOUS at 14:47

## 2020-11-28 RX ADMIN — METOCLOPRAMIDE 10 MG: 5 INJECTION, SOLUTION INTRAMUSCULAR; INTRAVENOUS at 14:49

## 2020-12-01 LAB
ATRIAL RATE: 86 BPM
P AXIS: 22 DEGREES
PR INTERVAL: 148 MS
QRS AXIS: -6 DEGREES
QRSD INTERVAL: 82 MS
QT INTERVAL: 360 MS
QTC INTERVAL: 430 MS
T WAVE AXIS: 5 DEGREES
VENTRICULAR RATE: 86 BPM

## 2020-12-01 PROCEDURE — 93010 ELECTROCARDIOGRAM REPORT: CPT | Performed by: INTERNAL MEDICINE

## 2020-12-02 ENCOUNTER — VBI (OUTPATIENT)
Dept: ADMINISTRATIVE | Facility: OTHER | Age: 25
End: 2020-12-02

## 2021-01-13 ENCOUNTER — HOSPITAL ENCOUNTER (EMERGENCY)
Facility: HOSPITAL | Age: 26
Discharge: HOME | End: 2021-01-13
Attending: EMERGENCY MEDICINE | Admitting: EMERGENCY MEDICINE
Payer: OTHER MISCELLANEOUS

## 2021-01-13 VITALS
HEART RATE: 112 BPM | WEIGHT: 180 LBS | RESPIRATION RATE: 16 BRPM | TEMPERATURE: 99.7 F | OXYGEN SATURATION: 96 % | SYSTOLIC BLOOD PRESSURE: 145 MMHG | BODY MASS INDEX: 26.58 KG/M2 | DIASTOLIC BLOOD PRESSURE: 81 MMHG

## 2021-01-13 DIAGNOSIS — Z77.21 EXPOSURE TO BLOOD OR BODY FLUID: Primary | ICD-10-CM

## 2021-01-13 PROCEDURE — 36415 COLL VENOUS BLD VENIPUNCTURE: CPT | Performed by: EMERGENCY MEDICINE

## 2021-01-13 PROCEDURE — 87340 HEPATITIS B SURFACE AG IA: CPT | Performed by: EMERGENCY MEDICINE

## 2021-01-13 PROCEDURE — 86706 HEP B SURFACE ANTIBODY: CPT | Performed by: EMERGENCY MEDICINE

## 2021-01-13 PROCEDURE — 99282 EMERGENCY DEPT VISIT SF MDM: CPT | Performed by: EMERGENCY MEDICINE

## 2021-01-13 PROCEDURE — 84460 ALANINE AMINO (ALT) (SGPT): CPT | Performed by: EMERGENCY MEDICINE

## 2021-01-13 PROCEDURE — 86803 HEPATITIS C AB TEST: CPT | Performed by: EMERGENCY MEDICINE

## 2021-01-13 PROCEDURE — 99283 EMERGENCY DEPT VISIT LOW MDM: CPT

## 2021-01-13 PROCEDURE — 87389 HIV-1 AG W/HIV-1&-2 AB AG IA: CPT | Performed by: EMERGENCY MEDICINE

## 2021-01-14 LAB
ALT SERPL W P-5'-P-CCNC: 41 U/L (ref 12–78)
HBV SURFACE AB SER-ACNC: 332.01 MIU/ML
HBV SURFACE AG SER QL: NORMAL
HCV AB SER QL: NORMAL
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
## 2021-01-14 NOTE — DISCHARGE INSTRUCTIONS
Please follow up with your employee health to discuss your baseline results and schedule appropriate follow up/ re-testing   Keep your eye clean

## 2021-01-15 LAB — HIV 1+2 AB+HIV1 P24 AG SERPL QL IA: NORMAL

## 2021-02-11 ENCOUNTER — TELEPHONE (OUTPATIENT)
Dept: NEUROLOGY | Facility: CLINIC | Age: 26
End: 2021-02-11

## 2021-02-11 NOTE — TELEPHONE ENCOUNTER
Called and spoke to patient -  patient confirmed upcoming apt with Dr Jw Bowman for 2/17/21  Patient has no issues or concerns at this time  Patient will bring all records

## 2021-02-17 ENCOUNTER — CONSULT (OUTPATIENT)
Dept: NEUROLOGY | Facility: CLINIC | Age: 26
End: 2021-02-17
Payer: COMMERCIAL

## 2021-02-17 VITALS
RESPIRATION RATE: 18 BRPM | BODY MASS INDEX: 27.74 KG/M2 | DIASTOLIC BLOOD PRESSURE: 88 MMHG | WEIGHT: 187.25 LBS | SYSTOLIC BLOOD PRESSURE: 140 MMHG | HEIGHT: 69 IN | HEART RATE: 76 BPM

## 2021-02-17 DIAGNOSIS — R56.9 SEIZURE-LIKE ACTIVITY (HCC): Primary | ICD-10-CM

## 2021-02-17 DIAGNOSIS — R25.8 ARM AND LEG MOVEMENTS, UNCONTROLLABLE: ICD-10-CM

## 2021-02-17 PROCEDURE — 99204 OFFICE O/P NEW MOD 45 MIN: CPT | Performed by: PSYCHIATRY & NEUROLOGY

## 2021-02-17 PROCEDURE — 1036F TOBACCO NON-USER: CPT | Performed by: PSYCHIATRY & NEUROLOGY

## 2021-02-17 PROCEDURE — 3008F BODY MASS INDEX DOCD: CPT | Performed by: PSYCHIATRY & NEUROLOGY

## 2021-02-17 NOTE — LETTER
2021     Silvia Rios DO  [address] [facility]  [address] [address]    Patient: Vanesa Rdz   YOB: 1995   Date of Visit: 2021       Dear Dr Wyatt Parker: Thank you for referring Mathew Kocher to me for evaluation  Below are my notes for this consultation  If you have questions, please do not hesitate to call me  I look forward to following your patient along with you  Sincerely,        Yuli Holden MD        CC: DO Yuli Rodriguez MD  2021 11:14 PM  Sign when Signing Visit  [facility]  Patient's Name: Vanesa Rdz   Patient's : 1995   Visit Type: consultation  Referring MD / PCP:  Silvia Rios DO / Dr Jayce King    Assessment:  Mr Vnaesa Rdz is a 30 y o  man who had an unexplained fall preceded by lightheadedness and bilateral legs gave out from him, followed by a brief period of inability to control his legs  There was no overt loss of consciousness or convulsion  He had a period of time when he could not get control of his hands to get himself up  Unknown mechanism of injury, possible sudden "shock" of a fall resulting in inability to use legs and arms  Based on a single episode, it is not clear to me if he had a seizure; there are some unusual seizures that can cause involuntary leg movements without loss of consciousness  Focal seizures from mesial frontal or parietal region can cause tonic seizures of the legs without causing loss of consciousness  If he did have a focal seizure, after 3-4 months, there should have been some recurrence of untreated seizures  We need to rule out the possibility of a focal seizure from a structural cause with an MRI brain study along with an EEG study  Due to the uncertainty of the diagnosis there is no work related restriction or disqualification from driving at this time      Plan:   1 - MRI brain w/wo contrast   2 - routine EEG study  3 - call the office if you have an episode of uncontrollable leg movements or tonic spasms  4 - follow-up in 6 months (sooner if there are clear seizures)      Problem List Items Addressed This Visit        Other    Arm and leg movements, uncontrollable    Relevant Orders    MRI brain seizure wo and w contrast    EEG awake or drowsy routine    Seizure-like activity ([facility] [address] ) - Primary    Relevant Orders    MRI brain seizure wo and w contrast    EEG awake or drowsy routine          Chief Complaint:    Chief Complaint   Patient presents with    Neurologic Problem     unclear if he had a seizure, loss function of his legs      HPI:    Clarissa Waldrop is a 30 y o  right handed male here for consultation evaluation of seizure versus loss of consciousness  Intake History 2/17/2021  He had a visit to the ED on 11/28/2020 for seizure like activity after a fall  He woke up around 8AM, felt very tired, did not feel well, went back to sleep and woke up around noon  It was unusual for him to sleep in at that time  He got up to get out of his room, but no more than stepping two feet he suddenly felt weak, lightheaded, fell down, felt that his legs were uncontrollably shaking for about 10 seconds, but he did not lose consciousness  He ended up on his back, some how his legs were "going crazy", he was unable to get his arms underneath himself up  He ended up laying on the ground for a minute before he could get up  His father did help him up  This is the first time he lost control of his body  He denies a history of prior seizures, loss of consciousness, or other involuntary movements or tremors  In November 2019, he was at work ([employer]), practicing a police drill, he jumped up on a moving truck, and some how had his legs in the wrong place, felt a pulling sensation in his groin  The next day he woke up with pain throughout his legs and lower back    He had a work up that found inflammation over his SI joints  For years he struggled with Ulcerative colitis, tried a variety of medications/immunomodulation drugs, ultimately he needed total colectomy  In , he was on Remicade, started to develop bad headaches  He had an MRI brain study that was reportedly unremarkable  Once he stopped the Remicade, then the headaches resolved  Other symptoms he report include, hearing loud noises when he goes to sleep  The patient denies any history of myoclonus, staring spells, automatisms, unexplained hyperkinetic behaviors, olfactory / gustatory hallucinations, epigastric rising events, nadeem vu events, visual hallucinations, unexplained nocturnal enuresis, or nocturnal tongue biting  AED/side effects/compliance:  None    Event/Seizure semiology:  1  Event of unknown origin - dizzy/lightheaded, then fell down, uncontrolled leg movements, unable to get his arms in position to stand up, no loss of consciousness    Prior Epilepsy History:  x    Special Features  Status epilepticus: No  Self Injury Seizures: No  Precipitating Factors: None    Epilepsy Risk Factors:  Abnormal pregnancy: No  Abnormal birth/: No  Abnormal Development: No  Febrile seizures, simple: No  Febrile seizures, complex: No  CNS infection: No  Mental retardation: No  Cerebral palsy: No  Head injury (moderate/severe): when he was 1years old he fell down a flight of stairs  CNS neoplasm: No  CNS malformation: No  Neurosurgical procedure: No  Stroke: No  CNS autoimmune disorder: No  Alcohol abuse: No  Drug abuse: No  Family history Sz/epilepsy: Niece who had an in utero stroke causing seizures      Prior AEDs:  medication Max dose Time used Reason to stop                 Prior workup:  I reviewed the CT head study myself  Imagin2020 - CT head  No acute intracranial pathology    EEGs:  None available    Labs:  Component      Latest Ref Rng & Units 2020   WBC      4 31 - 10 16 Thousand/uL 11 29 (H)   Red Blood Cell Count      3 88 - 5 62 Million/uL 6 47 (H)   Hemoglobin      12 0 - 17 0 g/dL 12 2   HCT      36 5 - 49 3 % 41 7   Platelet Count      841 - 390 Thousands/uL 389   Sodium      136 - 145 mmol/L 137   Potassium      3 5 - 5 3 mmol/L 4 4   Chloride      100 - 108 mmol/L 101   CO2      21 - 32 mmol/L 27   Anion Gap      4 - 13 mmol/L 9   BUN      5 - 25 mg/dL 11   Creatinine      0 60 - 1 30 mg/dL 1 08   Glucose, Random      65 - 140 mg/dL 78   Calcium      8 3 - 10 1 mg/dL 8 9   eGFR      ml/min/1 73sq m 94   Sed Rate      0 - 14 mm/hour 28 (H)   C-REACTIVE PROTEIN      <3 0 mg/L 12 7 (H)       General exam   /88 (BP Location: Left arm, Patient Position: Sitting, Cuff Size: Standard)   Pulse 76   Resp 18   Ht 5' 9" (1 753 m)   Wt 84 9 kg (187 lb 4 oz)   BMI 27 65 kg/m²    Appearance: normally developed, appears well  Carotids: no bruits present  Cardiovascular: regular rate and rhythm and normal heart sounds  Pulmonary: clear to auscultation    HEENT: anicteric and neck is supple   Fundoscopy: bilateral optic discs are sharp    Mental status  Orientation: alert and oriented to name, place, time  Fund of Knowledge: intact   Attention and Concentration: able to spell HOUSE forwards and backwards  Current and Remote Memory:recalled 3/3 words after five minutes  Language: spontaneous speech is normal and comprehension is intact    Cranial Nerves  CN 1: not tested  CN 2: Visual fields intact to confrontation and pupils equal round reactive to direct and consenual light   CN 3, 4, 6: EOMI, no nystagmus  CN 5:sensation intact to all distribution V1, V2, V3  CN 7:muscles of facial expression are symmetric  CN 8:symmetric to finger rubs bilaterally  CN 9, 10:no dysarthria present  CN 11:symmetric strength of sternocleidomastoid and trapezius muscles  CN 12:tongue is midline    Motor:  Bulk, Tone: normal bulk, normal tone  Pronation: no pronator drift  Strength: Patient has full strength symmetrically of shoulder abduction, biceps, triceps, finger flexion, finger abduction, hip flexion, knee flexion, knee extension, dorsiflexion  Abnormal movements: no abnormal movements are present    Sensory:  Lighttouch: intact in all limbs  Vibration: intact in all limbs  Pinprick: intact in all limbs  Romberg:normal    Coordination:  FNF:FNF bilaterally intact  WANDER:intact  FFM:intact  Gait/Station:normal gait and normal tandem gait    Reflexes:  Bicep, triceps, and brachioradialis reflexes are uniformly hyporeflexic  Bilateral knees and ankles are 2+/4    Past Medical/Surgical History:  Patient Active Problem List   Diagnosis    [facility] ulcerative pancolitis ([facility] )    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[facility] )    Stricture of rectum    CAR (generalized anxiety disorder)    BMI 28 0-28 9,adult    [facility])    Hyponatremia    Complications of intestinal pouch ([facility])    Left groin pain    Left-sided low back pain without sciatica    Sacroiliac joint dysfunction of right side    Arthralgia    Arm and leg movements, uncontrollable    Seizure-like activity ([facility] )     Past Medical History:   Diagnosis Date    Anxiety     Bowel obstruction ([facility])     Clostridium difficile colitis 9/13/2018    Colitis     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 ) 9/13/2018    Pancreatitis     Ulcerative colitis ([facility] )      Past Surgical History:   Procedure Laterality Date    COLECTOMY TOTAL      with ileal pouch and anastemosis    TOTAL COLECTOMY       Past Psychiatric History:  Depression: No  Anxiety: Generalized anxiety  Psychosis: No    Medications:    Current Outpatient Medications:     naproxen (EC NAPROSYN) 500 MG EC tablet, Take 500 mg by mouth Three times a day, Disp: , Rfl:     Allergies: Allergies   Allergen Reactions    Mesalamine GI Intolerance     Other reaction(s): Pancreatitis  Annotation - [id number]: pancreatitis     Family history:  History reviewed  No pertinent family history      Social History  Living situation:  Lives with family  Work:  Police officer  Driving:  Yes   reports that he has never smoked  He has never used smokeless tobacco  He reports current alcohol use of about 3 0 standard drinks of alcohol per week  He reports that he does not use drugs  Review of Systems  A review of at least 12 organ/systems was obtained by the medical assistant and reviewed by me, including additional positives/negatives:  Psychiatric/Behavioral: Positive for sleep disturbance  Negative for confusion and hallucinations  The total amount of time spent with the patient along with pre-chart and post-chart preparation was 62 minutes on the calendar day of the date of service  This included history taking, physical exam, review of ancillary testing, counseling provided to the patient regarding diagnosis, medications, treatment, and risk management, and other communication to the patient's providers and/or family    Start time: 2:33PM  End time: 3:35PM

## 2021-02-17 NOTE — PROGRESS NOTES
[facility]  Patient's Name: Eliza Guzamn   Patient's : 1995   Visit Type: consultation  Referring MD / PCP:  Homero Carlin DO / Dr Mattie Apley Anepu    Assessment:  Mr Eliza Guzman is a 30 y o  man who had an unexplained fall preceded by lightheadedness and bilateral legs gave out from him, followed by a brief period of inability to control his legs  There was no overt loss of consciousness or convulsion  He had a period of time when he could not get control of his hands to get himself up  Unknown mechanism of injury, possible sudden "shock" of a fall resulting in inability to use legs and arms  Based on a single episode, it is not clear to me if he had a seizure; there are some unusual seizures that can cause involuntary leg movements without loss of consciousness  Focal seizures from mesial frontal or parietal region can cause tonic seizures of the legs without causing loss of consciousness  If he did have a focal seizure, after 3-4 months, there should have been some recurrence of untreated seizures  We need to rule out the possibility of a focal seizure from a structural cause with an MRI brain study along with an EEG study  Due to the uncertainty of the diagnosis there is no work related restriction or disqualification from driving at this time  Plan:   1 - MRI brain w/wo contrast   2 - routine EEG study  3 - call the office if you have an episode of uncontrollable leg movements or tonic spasms    4 - follow-up in 6 months (sooner if there are clear seizures)      Problem List Items Addressed This Visit        Other    Arm and leg movements, uncontrollable    Relevant Orders    MRI brain seizure wo and w contrast    EEG awake or drowsy routine    Seizure-like activity ([facility] [address] ) - Primary    Relevant Orders    MRI brain seizure wo and w contrast    EEG awake or drowsy routine          Chief Complaint:    Chief Complaint   Patient presents with    Neurologic Problem     unclear if he had a seizure, loss function of his legs      HPI:    Kei Shelton is a 30 y o  right handed male here for consultation evaluation of seizure versus loss of consciousness  Intake History 2/17/2021  He had a visit to the ED on 11/28/2020 for seizure like activity after a fall  He woke up around 8AM, felt very tired, did not feel well, went back to sleep and woke up around noon  It was unusual for him to sleep in at that time  He got up to get out of his room, but no more than stepping two feet he suddenly felt weak, lightheaded, fell down, felt that his legs were uncontrollably shaking for about 10 seconds, but he did not lose consciousness  He ended up on his back, some how his legs were "going crazy", he was unable to get his arms underneath himself up  He ended up laying on the ground for a minute before he could get up  His father did help him up  This is the first time he lost control of his body  He denies a history of prior seizures, loss of consciousness, or other involuntary movements or tremors  In November 2019, he was at work ([employer]), practicing a police drill, he jumped up on a moving truck, and some how had his legs in the wrong place, felt a pulling sensation in his groin  The next day he woke up with pain throughout his legs and lower back  He had a work up that found inflammation over his SI joints  For years he struggled with Ulcerative colitis, tried a variety of medications/immunomodulation drugs, ultimately he needed total colectomy  In 2015, he was on Remicade, started to develop bad headaches  He had an MRI brain study that was reportedly unremarkable  Once he stopped the Remicade, then the headaches resolved  Other symptoms he report include, hearing loud noises when he goes to sleep      The patient denies any history of myoclonus, staring spells, automatisms, unexplained hyperkinetic behaviors, olfactory / gustatory hallucinations, epigastric rising events, nadeem vu events, visual hallucinations, unexplained nocturnal enuresis, or nocturnal tongue biting  AED/side effects/compliance:  None    Event/Seizure semiology:  1  Event of unknown origin - dizzy/lightheaded, then fell down, uncontrolled leg movements, unable to get his arms in position to stand up, no loss of consciousness    Prior Epilepsy History:  x    Special Features  Status epilepticus: No  Self Injury Seizures: No  Precipitating Factors: None    Epilepsy Risk Factors:  Abnormal pregnancy: No  Abnormal birth/: No  Abnormal Development: No  Febrile seizures, simple: No  Febrile seizures, complex: No  CNS infection: No  Mental retardation: No  Cerebral palsy: No  Head injury (moderate/severe): when he was 1years old he fell down a flight of stairs  CNS neoplasm: No  CNS malformation: No  Neurosurgical procedure: No  Stroke: No  CNS autoimmune disorder: No  Alcohol abuse: No  Drug abuse: No  Family history Sz/epilepsy: Niece who had an in utero stroke causing seizures      Prior AEDs:  medication Max dose Time used Reason to stop                 Prior workup:  I reviewed the CT head study myself  Imagin2020 - CT head  No acute intracranial pathology    EEGs:  None available    Labs:  Component      Latest Ref Rng & Units 2020   WBC      4 31 - 10 16 Thousand/uL 11 29 (H)   Red Blood Cell Count      3 88 - 5 62 Million/uL 6 47 (H)   Hemoglobin      12 0 - 17 0 g/dL 12 2   HCT      36 5 - 49 3 % 41 7   Platelet Count      210 - 390 Thousands/uL 389   Sodium      136 - 145 mmol/L 137   Potassium      3 5 - 5 3 mmol/L 4 4   Chloride      100 - 108 mmol/L 101   CO2      21 - 32 mmol/L 27   Anion Gap      4 - 13 mmol/L 9   BUN      5 - 25 mg/dL 11   Creatinine      0 60 - 1 30 mg/dL 1 08   Glucose, Random      65 - 140 mg/dL 78   Calcium      8 3 - 10 1 mg/dL 8 9   eGFR      ml/min/1 73sq m 94   Sed Rate      0 - 14 mm/hour 28 (H) C-REACTIVE PROTEIN      <3 0 mg/L 12 7 (H)       General exam   /88 (BP Location: Left arm, Patient Position: Sitting, Cuff Size: Standard)   Pulse 76   Resp 18   Ht 5' 9" (1 753 m)   Wt 84 9 kg (187 lb 4 oz)   BMI 27 65 kg/m²    Appearance: normally developed, appears well  Carotids: no bruits present  Cardiovascular: regular rate and rhythm and normal heart sounds  Pulmonary: clear to auscultation    HEENT: anicteric and neck is supple   Fundoscopy: bilateral optic discs are sharp    Mental status  Orientation: alert and oriented to name, place, time  Fund of Knowledge: intact   Attention and Concentration: able to spell HOUSE forwards and backwards  Current and Remote Memory:recalled 3/3 words after five minutes  Language: spontaneous speech is normal and comprehension is intact    Cranial Nerves  CN 1: not tested  CN 2: Visual fields intact to confrontation and pupils equal round reactive to direct and consenual light   CN 3, 4, 6: EOMI, no nystagmus  CN 5:sensation intact to all distribution V1, V2, V3  CN 7:muscles of facial expression are symmetric  CN 8:symmetric to finger rubs bilaterally  CN 9, 10:no dysarthria present  CN 11:symmetric strength of sternocleidomastoid and trapezius muscles  CN 12:tongue is midline    Motor:  Bulk, Tone: normal bulk, normal tone  Pronation: no pronator drift  Strength: Patient has full strength symmetrically of shoulder abduction, biceps, triceps, finger flexion, finger abduction, hip flexion, knee flexion, knee extension, dorsiflexion  Abnormal movements: no abnormal movements are present    Sensory:  Lighttouch: intact in all limbs  Vibration: intact in all limbs  Pinprick: intact in all limbs  Romberg:normal    Coordination:  FNF:FNF bilaterally intact  WANDER:intact  FFM:intact  Gait/Station:normal gait and normal tandem gait    Reflexes:  Bicep, triceps, and brachioradialis reflexes are uniformly hyporeflexic  Bilateral knees and ankles are 2+/4    Past Medical/Surgical History:  Patient Active Problem List   Diagnosis    Chronic ulcerative pancolitis (H[facility])    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[facility])    Stricture of rectum    CAR (generalized anxiety disorder)    BMI 28 0-28 9,adult    Thrombocytosis (HCC)    Hyponatremia    Complications of intestinal pouch ([facility])    Left groin pain    Left-sided low back pain without sciatica    Sacroiliac joint dysfunction of right side    Arthralgia    Arm and leg movements, uncontrollable    Seizure-like activity ([facility] [address] )     Past Medical History:   Diagnosis Date    Anxiety     Bowel obstruction ([facility])     Clostridium difficile colitis 9/13/2018    Colitis     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[facility] [address] ) 9/13/2018    Pancreatitis     Ulcerative colitis ([facility] [address] )      Past Surgical History:   Procedure Laterality Date    COLECTOMY TOTAL      with ileal pouch and anastemosis    TOTAL COLECTOMY       Past Psychiatric History:  Depression: No  Anxiety: Generalized anxiety  Psychosis: No    Medications:    Current Outpatient Medications:     naproxen (EC NAPROSYN) 500 MG EC tablet, Take 500 mg by mouth Three times a day, Disp: , Rfl:     Allergies: Allergies   Allergen Reactions    Mesalamine GI Intolerance     Other reaction(s): Pancreatitis  Annotation - [id number]: pancreatitis     Family history:  History reviewed  No pertinent family history  Social History  Living situation:  Lives with family  Work:    Driving:  Yes   reports that he has never smoked  He has never used smokeless tobacco  He reports current alcohol use of about 3 0 standard drinks of alcohol per week  He reports that he does not use drugs  Review of Systems  A review of at least 12 organ/systems was obtained by the medical assistant and reviewed by me, including additional positives/negatives:  Psychiatric/Behavioral: Positive for sleep disturbance  Negative for confusion and hallucinations       The total amount of time spent with the patient along with pre-chart and post-chart preparation was 62 minutes on the calendar day of the date of service  This included history taking, physical exam, review of ancillary testing, counseling provided to the patient regarding diagnosis, medications, treatment, and risk management, and other communication to the patient's providers and/or family    Start time: 2:33PM  End time: 3:35PM

## 2021-02-17 NOTE — PATIENT INSTRUCTIONS
Mr Vanesa Rdz is a 30 y o  man who had an unexplained fall preceded by lightheadedness and bilateral legs gave out from him, resulting in a fall, followed by a period of inability to control his legs  There was no overt loss of consciousness or convulsion  He had a period of time when he could not get control of his hands to get himself up  Unknown mechanism of injury, possible sudden "shock" of a fall resulting in inability to use legs and arms  Not fully consistent with known seizure types  But there are unusual cases of focal seizure from the mesial surface of the brain to cause seizures  Will need to rule out the possibility of a focal seizure from a structural cause with an MRI brain study along with an EEG study  Unclear diagnosis  Due to the uncertainty of the diagnosis there is no work related restriction or disqualification from driving at this time  Plan:   1 - MRI brain w/wo contrast   2 - routine EEG study  3 - call the office if you have an episode of uncontrollable leg movements or tonic spasms    4 - follow-up in 6 months (sooner if there are clear seizures)

## 2021-03-08 ENCOUNTER — TELEPHONE (OUTPATIENT)
Dept: NEUROLOGY | Facility: CLINIC | Age: 26
End: 2021-03-08

## 2021-03-28 ENCOUNTER — HOSPITAL ENCOUNTER (EMERGENCY)
Facility: HOSPITAL | Age: 26
Discharge: HOME | End: 2021-03-28
Attending: EMERGENCY MEDICINE | Admitting: EMERGENCY MEDICINE
Payer: COMMERCIAL

## 2021-03-28 VITALS
HEART RATE: 104 BPM | SYSTOLIC BLOOD PRESSURE: 170 MMHG | OXYGEN SATURATION: 96 % | RESPIRATION RATE: 18 BRPM | TEMPERATURE: 97.2 F | DIASTOLIC BLOOD PRESSURE: 84 MMHG

## 2021-03-28 DIAGNOSIS — M53.3 DISORDER OF SI (SACROILIAC) JOINT: Primary | ICD-10-CM

## 2021-03-28 PROCEDURE — 99283 EMERGENCY DEPT VISIT LOW MDM: CPT

## 2021-03-28 PROCEDURE — 96374 THER/PROPH/DIAG INJ IV PUSH: CPT

## 2021-03-28 PROCEDURE — 99285 EMERGENCY DEPT VISIT HI MDM: CPT | Performed by: EMERGENCY MEDICINE

## 2021-03-28 PROCEDURE — 96375 TX/PRO/DX INJ NEW DRUG ADDON: CPT

## 2021-03-28 RX ORDER — DIAZEPAM 5 MG/ML
5 INJECTION, SOLUTION INTRAMUSCULAR; INTRAVENOUS ONCE
Status: COMPLETED | OUTPATIENT
Start: 2021-03-28 | End: 2021-03-28

## 2021-03-28 RX ORDER — PREDNISONE 20 MG/1
40 TABLET ORAL DAILY
Qty: 10 TABLET | Refills: 0 | Status: SHIPPED | OUTPATIENT
Start: 2021-03-28 | End: 2021-04-02

## 2021-03-28 RX ORDER — MORPHINE SULFATE 4 MG/ML
4 INJECTION, SOLUTION INTRAMUSCULAR; INTRAVENOUS ONCE
Status: COMPLETED | OUTPATIENT
Start: 2021-03-28 | End: 2021-03-28

## 2021-03-28 RX ORDER — METHOCARBAMOL 500 MG/1
500 TABLET, FILM COATED ORAL ONCE
Status: DISCONTINUED | OUTPATIENT
Start: 2021-03-28 | End: 2021-03-28

## 2021-03-28 RX ORDER — TRAMADOL HYDROCHLORIDE 50 MG/1
50 TABLET ORAL EVERY 6 HOURS PRN
Qty: 10 TABLET | Refills: 0 | Status: SHIPPED | OUTPATIENT
Start: 2021-03-28 | End: 2021-04-07

## 2021-03-28 RX ORDER — CYCLOBENZAPRINE HCL 10 MG
10 TABLET ORAL ONCE
Status: COMPLETED | OUTPATIENT
Start: 2021-03-28 | End: 2021-03-28

## 2021-03-28 RX ORDER — PREDNISONE 20 MG/1
40 TABLET ORAL ONCE
Status: COMPLETED | OUTPATIENT
Start: 2021-03-28 | End: 2021-03-28

## 2021-03-28 RX ORDER — CYCLOBENZAPRINE HCL 10 MG
10 TABLET ORAL 2 TIMES DAILY PRN
Qty: 20 TABLET | Refills: 0 | Status: ON HOLD | OUTPATIENT
Start: 2021-03-28 | End: 2022-01-04 | Stop reason: CLARIF

## 2021-03-28 RX ADMIN — CYCLOBENZAPRINE HYDROCHLORIDE 10 MG: 10 TABLET, FILM COATED ORAL at 04:08

## 2021-03-28 RX ADMIN — PREDNISONE 40 MG: 20 TABLET ORAL at 04:08

## 2021-03-28 RX ADMIN — MORPHINE SULFATE 4 MG: 4 INJECTION INTRAVENOUS at 04:09

## 2021-03-28 RX ADMIN — DIAZEPAM 5 MG: 10 INJECTION, SOLUTION INTRAMUSCULAR; INTRAVENOUS at 04:36

## 2021-03-28 NOTE — ED PROVIDER NOTES
History  Chief Complaint   Patient presents with    Leg Pain     Patient c/o left leg pain after hearing a pop in his lower back  HPI  30 year male that presents with leg pain  Patient states he has history of ankylosing spondylitis and history of sciatica in the past   Patient states when he was walking he felt a pop and the left SI joint area and now the pain is shooting down his leg  Patient states he cannot bear any weight on that leg  Denies any bowel or bladder incontinence no numbness or weakness of the leg  30 year male that presents with leg pain  Symptomatic treatment reassessment  Prior to Admission Medications   Prescriptions Last Dose Informant Patient Reported? Taking?   naproxen (EC NAPROSYN) 500 MG EC tablet   Yes No   Sig: Take 500 mg by mouth Three times a day      Facility-Administered Medications: None       Past Medical History:   Diagnosis Date    Anxiety     Bowel obstruction (HCC)     Clostridium difficile colitis 9/13/2018    Colitis     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 9/13/2018    Pancreatitis     Ulcerative colitis ([facility] )        Past Surgical History:   Procedure Laterality Date    COLECTOMY TOTAL      with ileal pouch and anastemosis    TOTAL COLECTOMY         History reviewed  No pertinent family history  I have reviewed and agree with the history as documented  E-Cigarette/Vaping    E-Cigarette Use Never User      E-Cigarette/Vaping Substances     Social History     Tobacco Use    Smoking status: Never Smoker    Smokeless tobacco: Never Used   Substance Use Topics    Alcohol use: Yes     Alcohol/week: 3 0 standard drinks     Types: 3 Standard drinks or equivalent per week     Frequency: 2-4 times a month     Comment:  weekly    Drug use: No       Review of Systems   Constitutional: Negative  Negative for diaphoresis and fever  HENT: Negative  Respiratory: Negative  Negative for cough, shortness of breath and wheezing  Cardiovascular: Negative    Negative for chest pain, palpitations and leg swelling  Gastrointestinal: Negative for abdominal distention, abdominal pain, nausea and vomiting  Genitourinary: Negative  Musculoskeletal:        Left leg pain   Skin: Negative  Neurological: Negative  Psychiatric/Behavioral: Negative  All other systems reviewed and are negative  Physical Exam  Physical Exam  Vitals signs reviewed  Constitutional:       General: He is not in acute distress  Appearance: He is well-developed  He is not diaphoretic  HENT:      Head: Normocephalic and atraumatic  Nose: Nose normal    Eyes:      Conjunctiva/sclera: Conjunctivae normal       Pupils: Pupils are equal, round, and reactive to light  Neck:      Musculoskeletal: Normal range of motion and neck supple  Cardiovascular:      Rate and Rhythm: Normal rate and regular rhythm  Heart sounds: Normal heart sounds  No murmur  Pulmonary:      Effort: Pulmonary effort is normal  No respiratory distress  Breath sounds: Normal breath sounds  No wheezing or rales  Abdominal:      General: Bowel sounds are normal  There is no distension  Palpations: Abdomen is soft  Tenderness: There is no abdominal tenderness  There is no guarding or rebound  Musculoskeletal: Normal range of motion  General: No tenderness or deformity  Comments: No midline back pain or paraspinal pain  Tenderness around the SI joint  Normal strength and sensation lower extremities normal pulses   Skin:     General: Skin is warm and dry  Coloration: Skin is not pale  Findings: No rash  Neurological:      Mental Status: He is alert and oriented to person, place, and time  Cranial Nerves: No cranial nerve deficit           Vital Signs  ED Triage Vitals [03/28/21 0400]   Temperature Pulse Respirations Blood Pressure SpO2   (!) 97 2 °F (36 2 °C) 104 18 170/84 96 %      Temp Source Heart Rate Source Patient Position - Orthostatic VS BP Location FiO2 (%) Tympanic Monitor Sitting Left arm --      Pain Score       Worst Possible Pain           Vitals:    03/28/21 0400   BP: 170/84   Pulse: 104   Patient Position - Orthostatic VS: Sitting         Visual Acuity      ED Medications  Medications   morphine (PF) 4 mg/mL injection 4 mg (4 mg Intravenous Given 3/28/21 0409)   predniSONE tablet 40 mg (40 mg Oral Given 3/28/21 0408)   cyclobenzaprine (FLEXERIL) tablet 10 mg (10 mg Oral Given 3/28/21 0408)   diazepam (VALIUM) injection 5 mg (5 mg Intravenous Given 3/28/21 0436)       Diagnostic Studies  Results Reviewed     None                 No orders to display              Procedures  Procedures         ED Course                                           MDM    Disposition  Final diagnoses:   Disorder of SI (sacroiliac) joint     Time reflects when diagnosis was documented in both MDM as applicable and the Disposition within this note     Time User Action Codes Description Comment    3/28/2021  5:31 AM Domenic Early [M53 3] Disorder of SI (sacroiliac) joint       ED Disposition     ED Disposition Condition Date/Time Comment    Discharge Stable Sun Mar 28, 2021  5:30 AM Tatiana Jamil discharge to home/self care              Follow-up Information     Follow up With Specialties Details Why [address] [place] [address], DO Family Medicine  As needed [address]  [phone]            Discharge Medication List as of 3/28/2021  5:33 AM      START taking these medications    Details   cyclobenzaprine (FLEXERIL) 10 mg tablet Take 1 tablet (10 mg total) by mouth 2 (two) times a day as needed for muscle spasms, Starting Sun 3/28/2021, Normal      predniSONE 20 mg tablet Take 2 tablets (40 mg total) by mouth daily for 5 days, Starting Sun 3/28/2021, Until Fri 4/2/2021, Normal      traMADol (ULTRAM) 50 mg tablet Take 1 tablet (50 mg total) by mouth every 6 (six) hours as needed for moderate pain for up to 10 days, Starting Sun 3/28/2021, Until Wed 4/7/2021, Normal         CONTINUE these medications which have NOT CHANGED    Details   naproxen (EC NAPROSYN) 500 MG EC tablet Take 500 mg by mouth Three times a day, Starting Sun 1/12/2020, Historical Med           No discharge procedures on file      PDMP Review     None          ED Provider  Electronically Signed by           Clarice Runner, MD  03/28/21 8922

## 2021-03-28 NOTE — Clinical Note
Laine Aragon was seen and treated in our emergency department on 3/28/2021  Diagnosis:     Merline Dienes  may return to work on return date  He may return on this date: 03/29/2021         If you have any questions or concerns, please don't hesitate to call        hTomas Alexandra RN    ______________________________           _______________          _______________  Hospital Representative                              Date                                Time

## 2021-03-29 ENCOUNTER — VBI (OUTPATIENT)
Dept: FAMILY MEDICINE CLINIC | Facility: CLINIC | Age: 26
End: 2021-03-29

## 2021-03-29 NOTE — TELEPHONE ENCOUNTER
[facility]    ED Visit Information     Ed visit date:3/28/21  Diagnosis Description:SACROILIAC JOINT  In Network? Yes [facility]  Discharge status: Home  Discharged with meds ? Yes  Number of ED visits to date: 4  ED Severity:N/A     Outreach Information    Outreach successful: Yes 1  Date letter mailed:N/A  Date Finalized:3/29/21    Care Coordination    Follow up appointment with pcp: yes F/U WITH RHEUMOTOLOGY  Transportation issues ?  No    Value [employer]

## 2021-04-26 NOTE — TELEPHONE ENCOUNTER
Collected from fax bin and faxed  Confirmation fax received      No further action needed 56 yr male with PMH of DM who was initially admitted to [facility] ICU for hypoxic respiratory failure 2/2 COVID c/b PE with R heart strain, cardiogenic and septic shock, ischemic and hemorrhagic CVA and ESBL klebsiella ventilator associate PNA, transferred to [facility] for neurosurgery eval.   No acute events. Awake and alert today, however mental status relatively unchanged.  Pending EEG and FEES eval today  On heparin gtt for PE/DVT and acute CVA.  Rest as above.

## 2021-05-24 ENCOUNTER — HOSPITAL ENCOUNTER (EMERGENCY)
Facility: HOSPITAL | Age: 26
Discharge: HOME | End: 2021-05-24
Attending: EMERGENCY MEDICINE | Admitting: EMERGENCY MEDICINE
Payer: COMMERCIAL

## 2021-05-24 ENCOUNTER — APPOINTMENT (EMERGENCY)
Dept: RADIOLOGY | Facility: HOSPITAL | Age: 26
End: 2021-05-24
Payer: COMMERCIAL

## 2021-05-24 VITALS
DIASTOLIC BLOOD PRESSURE: 56 MMHG | TEMPERATURE: 96.5 F | OXYGEN SATURATION: 97 % | RESPIRATION RATE: 18 BRPM | HEART RATE: 70 BPM | SYSTOLIC BLOOD PRESSURE: 118 MMHG

## 2021-05-24 DIAGNOSIS — K62.89 PROCTITIS: ICD-10-CM

## 2021-05-24 DIAGNOSIS — K51.90 ULCERATIVE COLITIS (HCC): ICD-10-CM

## 2021-05-24 DIAGNOSIS — R10.9 ABDOMINAL PAIN: Primary | ICD-10-CM

## 2021-05-24 LAB
ALBUMIN SERPL BCP-MCNC: 3.8 G/DL (ref 3.5–5)
ALP SERPL-CCNC: 76 U/L (ref 46–116)
ALT SERPL W P-5'-P-CCNC: 42 U/L (ref 12–78)
ANION GAP SERPL CALCULATED.3IONS-SCNC: 8 MMOL/L (ref 4–13)
AST SERPL W P-5'-P-CCNC: 23 U/L (ref 5–45)
BASOPHILS # BLD AUTO: 0.1 THOUSANDS/ΜL (ref 0–0.1)
BASOPHILS NFR BLD AUTO: 1 % (ref 0–1)
BILIRUB SERPL-MCNC: 0.67 MG/DL (ref 0.2–1)
BUN SERPL-MCNC: 8 MG/DL (ref 5–25)
CALCIUM SERPL-MCNC: 8.6 MG/DL (ref 8.3–10.1)
CHLORIDE SERPL-SCNC: 104 MMOL/L (ref 100–108)
CO2 SERPL-SCNC: 27 MMOL/L (ref 21–32)
CREAT SERPL-MCNC: 1.03 MG/DL (ref 0.6–1.3)
EOSINOPHIL # BLD AUTO: 0.33 THOUSAND/ΜL (ref 0–0.61)
EOSINOPHIL NFR BLD AUTO: 4 % (ref 0–6)
ERYTHROCYTE [DISTWIDTH] IN BLOOD BY AUTOMATED COUNT: 18.3 % (ref 11.6–15.1)
GFR SERPL CREATININE-BSD FRML MDRD: 98 ML/MIN/1.73SQ M
GLUCOSE SERPL-MCNC: 88 MG/DL (ref 65–140)
HCT VFR BLD AUTO: 40.5 % (ref 36.5–49.3)
HGB BLD-MCNC: 11.6 G/DL (ref 12–17)
IMM GRANULOCYTES # BLD AUTO: 0.03 THOUSAND/UL (ref 0–0.2)
IMM GRANULOCYTES NFR BLD AUTO: 0 % (ref 0–2)
LACTATE SERPL-SCNC: 1 MMOL/L (ref 0.5–2)
LIPASE SERPL-CCNC: 111 U/L (ref 73–393)
LYMPHOCYTES # BLD AUTO: 2.36 THOUSANDS/ΜL (ref 0.6–4.47)
LYMPHOCYTES NFR BLD AUTO: 27 % (ref 14–44)
MAGNESIUM SERPL-MCNC: 2.1 MG/DL (ref 1.6–2.6)
MCH RBC QN AUTO: 18.7 PG (ref 26.8–34.3)
MCHC RBC AUTO-ENTMCNC: 28.6 G/DL (ref 31.4–37.4)
MCV RBC AUTO: 65 FL (ref 82–98)
MONOCYTES # BLD AUTO: 1.04 THOUSAND/ΜL (ref 0.17–1.22)
MONOCYTES NFR BLD AUTO: 12 % (ref 4–12)
NEUTROPHILS # BLD AUTO: 4.9 THOUSANDS/ΜL (ref 1.85–7.62)
NEUTS SEG NFR BLD AUTO: 56 % (ref 43–75)
NRBC BLD AUTO-RTO: 0 /100 WBCS
PLATELET # BLD AUTO: 384 THOUSANDS/UL (ref 149–390)
PMV BLD AUTO: 8.8 FL (ref 8.9–12.7)
POTASSIUM SERPL-SCNC: 3.8 MMOL/L (ref 3.5–5.3)
PROT SERPL-MCNC: 7.2 G/DL (ref 6.4–8.2)
RBC # BLD AUTO: 6.21 MILLION/UL (ref 3.88–5.62)
SODIUM SERPL-SCNC: 139 MMOL/L (ref 136–145)
TROPONIN I SERPL-MCNC: <0.02 NG/ML
WBC # BLD AUTO: 8.76 THOUSAND/UL (ref 4.31–10.16)

## 2021-05-24 PROCEDURE — G1004 CDSM NDSC: HCPCS

## 2021-05-24 PROCEDURE — 96361 HYDRATE IV INFUSION ADD-ON: CPT

## 2021-05-24 PROCEDURE — 83690 ASSAY OF LIPASE: CPT | Performed by: EMERGENCY MEDICINE

## 2021-05-24 PROCEDURE — 99285 EMERGENCY DEPT VISIT HI MDM: CPT

## 2021-05-24 PROCEDURE — 99283 EMERGENCY DEPT VISIT LOW MDM: CPT | Performed by: EMERGENCY MEDICINE

## 2021-05-24 PROCEDURE — C9113 INJ PANTOPRAZOLE SODIUM, VIA: HCPCS | Performed by: EMERGENCY MEDICINE

## 2021-05-24 PROCEDURE — 71045 X-RAY EXAM CHEST 1 VIEW: CPT

## 2021-05-24 PROCEDURE — 96365 THER/PROPH/DIAG IV INF INIT: CPT

## 2021-05-24 PROCEDURE — 84484 ASSAY OF TROPONIN QUANT: CPT | Performed by: EMERGENCY MEDICINE

## 2021-05-24 PROCEDURE — 93005 ELECTROCARDIOGRAM TRACING: CPT

## 2021-05-24 PROCEDURE — 36415 COLL VENOUS BLD VENIPUNCTURE: CPT | Performed by: EMERGENCY MEDICINE

## 2021-05-24 PROCEDURE — 85025 COMPLETE CBC W/AUTO DIFF WBC: CPT | Performed by: EMERGENCY MEDICINE

## 2021-05-24 PROCEDURE — 96375 TX/PRO/DX INJ NEW DRUG ADDON: CPT

## 2021-05-24 PROCEDURE — 96367 TX/PROPH/DG ADDL SEQ IV INF: CPT

## 2021-05-24 PROCEDURE — 83735 ASSAY OF MAGNESIUM: CPT | Performed by: EMERGENCY MEDICINE

## 2021-05-24 PROCEDURE — 74177 CT ABD & PELVIS W/CONTRAST: CPT

## 2021-05-24 PROCEDURE — 83605 ASSAY OF LACTIC ACID: CPT | Performed by: EMERGENCY MEDICINE

## 2021-05-24 PROCEDURE — 96366 THER/PROPH/DIAG IV INF ADDON: CPT

## 2021-05-24 PROCEDURE — 80053 COMPREHEN METABOLIC PANEL: CPT | Performed by: EMERGENCY MEDICINE

## 2021-05-24 PROCEDURE — 96376 TX/PRO/DX INJ SAME DRUG ADON: CPT

## 2021-05-24 RX ORDER — HYDROMORPHONE HCL/PF 1 MG/ML
1 SYRINGE (ML) INJECTION ONCE
Status: COMPLETED | OUTPATIENT
Start: 2021-05-24 | End: 2021-05-24

## 2021-05-24 RX ORDER — CIPROFLOXACIN 500 MG/1
500 TABLET, FILM COATED ORAL 2 TIMES DAILY
Qty: 20 TABLET | Refills: 0 | Status: SHIPPED | OUTPATIENT
Start: 2021-05-24 | End: 2021-05-31

## 2021-05-24 RX ORDER — PANTOPRAZOLE SODIUM 40 MG/1
40 INJECTION, POWDER, FOR SOLUTION INTRAVENOUS ONCE
Status: COMPLETED | OUTPATIENT
Start: 2021-05-24 | End: 2021-05-24

## 2021-05-24 RX ORDER — PREDNISONE 50 MG/1
50 TABLET ORAL DAILY
Qty: 5 TABLET | Refills: 0 | Status: SHIPPED | OUTPATIENT
Start: 2021-05-24 | End: 2021-05-29

## 2021-05-24 RX ORDER — METHYLPREDNISOLONE SODIUM SUCCINATE 125 MG/2ML
60 INJECTION, POWDER, LYOPHILIZED, FOR SOLUTION INTRAMUSCULAR; INTRAVENOUS ONCE
Status: COMPLETED | OUTPATIENT
Start: 2021-05-24 | End: 2021-05-24

## 2021-05-24 RX ORDER — DICYCLOMINE HYDROCHLORIDE 10 MG/1
20 CAPSULE ORAL ONCE
Status: COMPLETED | OUTPATIENT
Start: 2021-05-24 | End: 2021-05-24

## 2021-05-24 RX ORDER — METRONIDAZOLE 500 MG/1
500 TABLET ORAL EVERY 8 HOURS SCHEDULED
Qty: 30 TABLET | Refills: 0 | Status: SHIPPED | OUTPATIENT
Start: 2021-05-24 | End: 2021-05-31

## 2021-05-24 RX ORDER — HYDROCODONE BITARTRATE AND ACETAMINOPHEN 5; 325 MG/1; MG/1
1 TABLET ORAL EVERY 6 HOURS PRN
Qty: 15 TABLET | Refills: 0 | Status: SHIPPED | OUTPATIENT
Start: 2021-05-24 | End: 2021-06-03

## 2021-05-24 RX ORDER — LEVOFLOXACIN 5 MG/ML
750 INJECTION, SOLUTION INTRAVENOUS ONCE
Status: COMPLETED | OUTPATIENT
Start: 2021-05-24 | End: 2021-05-24

## 2021-05-24 RX ORDER — ONDANSETRON 2 MG/ML
4 INJECTION INTRAMUSCULAR; INTRAVENOUS ONCE
Status: COMPLETED | OUTPATIENT
Start: 2021-05-24 | End: 2021-05-24

## 2021-05-24 RX ADMIN — METHYLPREDNISOLONE SODIUM SUCCINATE 60 MG: 125 INJECTION, POWDER, FOR SOLUTION INTRAMUSCULAR; INTRAVENOUS at 12:10

## 2021-05-24 RX ADMIN — DICYCLOMINE HYDROCHLORIDE 20 MG: 10 CAPSULE ORAL at 11:38

## 2021-05-24 RX ADMIN — LEVOFLOXACIN 750 MG: 5 INJECTION, SOLUTION INTRAVENOUS at 12:10

## 2021-05-24 RX ADMIN — ONDANSETRON 4 MG: 2 INJECTION INTRAMUSCULAR; INTRAVENOUS at 07:42

## 2021-05-24 RX ADMIN — IOHEXOL 100 ML: 350 INJECTION, SOLUTION INTRAVENOUS at 09:06

## 2021-05-24 RX ADMIN — HYDROMORPHONE HYDROCHLORIDE 1 MG: 1 INJECTION, SOLUTION INTRAMUSCULAR; INTRAVENOUS; SUBCUTANEOUS at 08:52

## 2021-05-24 RX ADMIN — METRONIDAZOLE 500 MG: 500 INJECTION, SOLUTION INTRAVENOUS at 11:39

## 2021-05-24 RX ADMIN — PANTOPRAZOLE SODIUM 40 MG: 40 INJECTION, POWDER, FOR SOLUTION INTRAVENOUS at 08:52

## 2021-05-24 RX ADMIN — SODIUM CHLORIDE 1000 ML: 0.9 INJECTION, SOLUTION INTRAVENOUS at 07:29

## 2021-05-24 RX ADMIN — IOHEXOL 50 ML: 240 INJECTION, SOLUTION INTRATHECAL; INTRAVASCULAR; INTRAVENOUS; ORAL at 07:40

## 2021-05-24 RX ADMIN — HYDROMORPHONE HYDROCHLORIDE 1 MG: 1 INJECTION, SOLUTION INTRAMUSCULAR; INTRAVENOUS; SUBCUTANEOUS at 07:29

## 2021-05-24 NOTE — Clinical Note
Loretta Chris was seen and treated in our emergency department on 5/24/2021  Diagnosis:     Addison Lee  may return to work on return date  He may return on this date: 05/25/2021         If you have any questions or concerns, please don't hesitate to call        Hortencia Lilly RN    ______________________________           _______________          _______________  Hospital Representative                              Date                                Time

## 2021-05-24 NOTE — ED PROVIDER NOTES
History  Chief Complaint   Patient presents with    Abdominal Pain     PT REPORTS EPIGASTRIC PAIN/LEFT SIDED ABD PAIN STARTING LAST NIGHT, UABLE TO DEFACATE AND REPORTSD HX OF COLITIS AND BOWEL OBSTRUCTOIN     49-year-old male with past medical history of ulcerative colitis status post hemicolectomy, DVT after hemicolectomy surgery, pancreatitis, multiple episodes of SBO, presents to the ER with complaint of epigastric and left-sided abdominal pain since 1:00 a m  This morning  Patient states as he is having difficulty having a bowel movement  Patient is a significant amount of pain during exam   Patient states that his symptoms feel like previous SBO pain  Patient came to the ED for further evaluation  Patient has had some nausea but denies vomiting  History provided by:  Patient  Abdominal Pain  Associated symptoms: nausea    Associated symptoms: no chest pain, no chills, no cough, no dysuria, no fever, no hematuria, no shortness of breath, no sore throat and no vomiting        Prior to Admission Medications   Prescriptions Last Dose Informant Patient Reported? Taking? adalimumab (HUMIRA) 40 mg/0 8 mL PSKT   Yes Yes   Sig: Inject 40 mg under the skin every 14 (fourteen) days   cyclobenzaprine (FLEXERIL) 10 mg tablet   No Yes   Sig: Take 1 tablet (10 mg total) by mouth 2 (two) times a day as needed for muscle spasms   naproxen (EC NAPROSYN) 500 MG EC tablet   Yes Yes   Sig: Take 500 mg by mouth Three times a day      Facility-Administered Medications: None       Past Medical History:   Diagnosis Date    Anxiety     Bowel obstruction (HCC)     Clostridium difficile colitis 9/13/2018    Colitis     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 ) 9/13/2018    Pancreatitis     Ulcerative colitis ([facility] )        Past Surgical History:   Procedure Laterality Date    COLECTOMY TOTAL      with ileal pouch and anastemosis    TOTAL COLECTOMY         History reviewed  No pertinent family history    I have reviewed and agree with the history as documented  E-Cigarette/Vaping    E-Cigarette Use Never User      E-Cigarette/Vaping Substances     Social History     Tobacco Use    Smoking status: Never Smoker    Smokeless tobacco: Never Used   Substance Use Topics    Alcohol use: Yes     Alcohol/week: 3 0 standard drinks     Types: 3 Standard drinks or equivalent per week     Frequency: Monthly or less     Comment: BARELY PT REPORTS    Drug use: No       Review of Systems   Constitutional: Negative for chills and fever  HENT: Negative for ear pain and sore throat  Eyes: Negative for pain and visual disturbance  Respiratory: Negative for cough and shortness of breath  Cardiovascular: Negative for chest pain and palpitations  Gastrointestinal: Positive for abdominal pain and nausea  Negative for vomiting  Genitourinary: Negative for dysuria and hematuria  Musculoskeletal: Negative for arthralgias and back pain  Skin: Negative for color change and rash  Neurological: Negative for seizures and syncope  All other systems reviewed and are negative  Physical Exam  Physical Exam  Vitals signs and nursing note reviewed  Constitutional:       Appearance: He is well-developed  HENT:      Head: Normocephalic and atraumatic  Eyes:      Conjunctiva/sclera: Conjunctivae normal    Neck:      Musculoskeletal: Neck supple  Cardiovascular:      Rate and Rhythm: Normal rate and regular rhythm  Heart sounds: No murmur  Pulmonary:      Effort: Pulmonary effort is normal  No respiratory distress  Breath sounds: Normal breath sounds  Abdominal:      Palpations: Abdomen is soft  Tenderness: There is abdominal tenderness in the epigastric area, left upper quadrant and left lower quadrant  Comments: Open wounds soft, nondistended  Hypoactive bowel sounds noted throughout  Tenderness to palpation noted to the epigastric and left side of abdomen   Skin:     General: Skin is warm and dry     Neurological: Mental Status: He is alert           Vital Signs  ED Triage Vitals [05/24/21 0711]   Temperature Pulse Respirations Blood Pressure SpO2   (!) 96 5 °F (35 8 °C) 71 18 131/76 100 %      Temp Source Heart Rate Source Patient Position - Orthostatic VS BP Location FiO2 (%)   Tympanic Monitor Sitting Left arm --      Pain Score       8           Vitals:    05/24/21 0745 05/24/21 0845 05/24/21 0943 05/24/21 1142   BP: 131/68 118/85 126/71 128/67   Pulse: 68 66 65 62   Patient Position - Orthostatic VS: Sitting Sitting Sitting Sitting         Visual Acuity      ED Medications  Medications   levofloxacin (LEVAQUIN) IVPB (premix in dextrose) 750 mg 150 mL (750 mg Intravenous New Bag 5/24/21 1210)   sodium chloride 0 9 % bolus 1,000 mL (0 mL Intravenous Stopped 5/24/21 1055)   HYDROmorphone (DILAUDID) injection 1 mg (1 mg Intravenous Given 5/24/21 0729)   ondansetron (ZOFRAN) injection 4 mg (4 mg Intravenous Given 5/24/21 0742)   ondansetron (ZOFRAN) injection 4 mg (4 mg Intravenous Given 5/24/21 0742)   iohexol (OMNIPAQUE) 240 MG/ML solution 50 mL (50 mL Oral Given 5/24/21 0740)   pantoprazole (PROTONIX) injection 40 mg (40 mg Intravenous Given 5/24/21 0852)   HYDROmorphone (DILAUDID) injection 1 mg (1 mg Intravenous Given 5/24/21 0852)   iohexol (OMNIPAQUE) 350 MG/ML injection (SINGLE-DOSE) 100 mL (100 mL Intravenous Given 5/24/21 0906)   metroNIDAZOLE (FLAGYL) IVPB (premix) 500 mg 100 mL (0 mg Intravenous Stopped 5/24/21 1210)   dicyclomine (BENTYL) capsule 20 mg (20 mg Oral Given 5/24/21 1138)   methylPREDNISolone sodium succinate (Solu-MEDROL) injection 60 mg (60 mg Intravenous Given 5/24/21 1210)       Diagnostic Studies  Results Reviewed     Procedure Component Value Units Date/Time    Troponin I [[id number]]  (Normal) Collected: 05/24/21 0729    Lab Status: Final result Specimen: Blood from Arm, Right Updated: 05/24/21 0833     Troponin I <0 02 ng/mL     Magnesium [[id number]]  (Normal) Collected: 05/24/21 0722    Lab Status: Final result Specimen: Blood from Arm, Right Updated: 05/24/21 0823     Magnesium 2 1 mg/dL     Lactic acid [[id number]]  (Normal) Collected: 05/24/21 0729    Lab Status: Final result Specimen: Blood from Arm, Right Updated: 05/24/21 8026     LACTIC ACID 1 0 mmol/L     Narrative:      Result may be elevated if tourniquet was used during collection      Comprehensive metabolic panel [[id number]] Collected: 05/24/21 0722    Lab Status: Final result Specimen: Blood from Arm, Right Updated: 05/24/21 0744     Sodium 139 mmol/L      Potassium 3 8 mmol/L      Chloride 104 mmol/L      CO2 27 mmol/L      ANION GAP 8 mmol/L      BUN 8 mg/dL      Creatinine 1 03 mg/dL      Glucose 88 mg/dL      Calcium 8 6 mg/dL      AST 23 U/L      ALT 42 U/L      Alkaline Phosphatase 76 U/L      Total Protein 7 2 g/dL      Albumin 3 8 g/dL      Total Bilirubin 0 67 mg/dL      eGFR 98 ml/min/1 73sq m     Narrative:      [place] guidelines for Chronic Kidney Disease (CKD):     Stage 1 with normal or high GFR (GFR > 90 mL/min/1 73 square meters)    Stage 2 Mild CKD (GFR = 60-89 mL/min/1 73 square meters)    Stage 3A Moderate CKD (GFR = 45-59 mL/min/1 73 square meters)    Stage 3B Moderate CKD (GFR = 30-44 mL/min/1 73 square meters)    Stage 4 Severe CKD (GFR = 15-29 mL/min/1 73 square meters)    Stage 5 End Stage CKD (GFR <15 mL/min/1 73 square meters)  Note: GFR calculation is accurate only with a steady state creatinine    Lipase [[id number]]  (Normal) Collected: 05/24/21 0722    Lab Status: Final result Specimen: Blood from Arm, Right Updated: 05/24/21 0744     Lipase 111 u/L     CBC and differential [[id number]]  (Abnormal) Collected: 05/24/21 0722    Lab Status: Final result Specimen: Blood from Arm, Right Updated: 05/24/21 0728     WBC 8 76 Thousand/uL      RBC 6 21 Million/uL      Hemoglobin 11 6 g/dL      Hematocrit 40 5 %      MCV 65 fL      MCH 18 7 pg      MCHC 28 6 g/dL      RDW 18 3 %      MPV 8 8 fL      Platelets 401 Thousands/uL      nRBC 0 /100 WBCs      Neutrophils Relative 56 %      Immat GRANS % 0 %      Lymphocytes Relative 27 %      Monocytes Relative 12 %      Eosinophils Relative 4 %      Basophils Relative 1 %      Neutrophils Absolute 4 90 Thousands/µL      Immature Grans Absolute 0 03 Thousand/uL      Lymphocytes Absolute 2 36 Thousands/µL      Monocytes Absolute 1 04 Thousand/µL      Eosinophils Absolute 0 33 Thousand/µL      Basophils Absolute 0 10 Thousands/µL     UA w Reflex to Microscopic w Reflex to Culture [[id number]]     Lab Status: No result Specimen: Urine                  CT abdomen pelvis with contrast   Final Result by Cathy Conn MD (05/24 5941)      1  Wall thickening and mucosal hyperenhancement of the ileal pouch consistent with proctitis   2  1 5 cm pelvic lymph node, stable dating back to 2017      The study was marked in EPIC for immediate notification  Workstation performed: [id number]         XR chest 1 view portable   Final Result by Wesley Jaquez MD (05/24 6619)      No acute cardiopulmonary disease  Workstation performed: [id number]                    Procedures  ECG 12 Lead Documentation Only    Date/Time: 5/24/2021 7:57 AM  Performed by: Joyce Horan DO  Authorized by: Joyce Horan DO     Indications / Diagnosis:  Pain  ECG reviewed by me, the ED Provider: yes    Patient location:  ED  Previous ECG:     Previous ECG:  Compared to current    Similarity:  No change    Comparison to cardiac monitor: Yes    Interpretation:     Interpretation: normal    Comments:      Sinus rhythm her ECC 2, normal axis, normal intervals, no acute ST/treatment is noted, otherwise unremarkable EKG, unchanged from previous study  ED Course  ED Course as of May 24 1235   Mon May 24, 2021   1045 Patient resume it was it  Pain is improved                                  SBIRT 22yo+      Most Recent Value   SBIRT (22 yo +)   In order to provide better care to our patients, we are screening all of our patients for alcohol and drug use  Would it be okay to ask you these screening questions? No Filed at: 05/24/2021 0722                    MDM  Number of Diagnoses or Management Options  Abdominal pain: new and requires workup  Proctitis: new and requires workup  Ulcerative colitis [facility]): new and requires workup  Diagnosis management comments: Obtain blood work, chest x-ray, CT abdomen/pelvis  Give IV fluids, antiemetics, pain medication and reassess       Amount and/or Complexity of Data Reviewed  Clinical lab tests: reviewed and ordered  Tests in the radiology section of CPT®: ordered and reviewed  Tests in the medicine section of CPT®: ordered and reviewed  Review and summarize past medical records: yes  Independent visualization of images, tracings, or specimens: yes    Risk of Complications, Morbidity, and/or Mortality  General comments: Patient's lab work was essentially unremarkable  CT scan shows some proctitis without any SBO  Patient's pain was improved in the ED with IV Dilaudid  At this time patient's symptoms may be a combination of flare up of his ulcerative colitis versus infectious in nature  This time patient is discharged home on antibiotics as well as steroids and pain medication  Patient will follow-up with his own colorectal surgeon and GI as soon as possible  Patient will also follow-up with PCP  Close return instructions given to return to the ER for any worsening symptoms  Patient agrees with discharge plan  Patient well appearing at time of discharge  Please Note: Fluency Direct voice recognition software may have been used in the creation of this document  Wrong words or sound a like substitutions may have occurred due to the inherent limitations of the voice software         Patient Progress  Patient progress: improved      Disposition  Final diagnoses:   Abdominal pain   Proctitis   Ulcerative colitis ([facility] ) Time reflects when diagnosis was documented in both MDM as applicable and the Disposition within this note     Time User Action Codes Description Comment    5/24/2021 12:29 PM Nico Betts Add [R10 9] Abdominal pain     5/24/2021 12:29 PM Arabella Thomas [K62 89] Proctitis     5/24/2021 12:29 PM Nico Betts Add [K51 90] Ulcerative colitis [facility])       ED Disposition     ED Disposition Condition Date/Time Comment    Discharge Stable Mon May 24, 2021 12:29 PM Thuy Snyder discharge to home/self care  Follow-up Information     Follow up With Specialties Details Why Contact Info      In 3 days  Please follow-up with your GI doctor as well as colorectal surgeon as soon as possible  Taylor Lion, DO Family Medicine   [address] [place] [address]  [phone]            Patient's Medications   Discharge Prescriptions    CIPROFLOXACIN (CIPRO) 500 MG TABLET    Take 1 tablet (500 mg total) by mouth 2 (two) times a day for 7 days       Start Date: 5/24/2021 End Date: 5/31/2021       Order Dose: 500 mg       Quantity: 20 tablet    Refills: 0    HYDROCODONE-ACETAMINOPHEN (NORCO) 5-325 MG PER TABLET    Take 1 tablet by mouth every 6 (six) hours as needed for pain for up to 10 daysMax Daily Amount: 4 tablets       Start Date: 5/24/2021 End Date: 6/3/2021       Order Dose: 1 tablet       Quantity: 15 tablet    Refills: 0    METRONIDAZOLE (FLAGYL) 500 MG TABLET    Take 1 tablet (500 mg total) by mouth every 8 (eight) hours for 7 days       Start Date: 5/24/2021 End Date: 5/31/2021       Order Dose: 500 mg       Quantity: 30 tablet    Refills: 0    PREDNISONE 50 MG TABLET    Take 1 tablet (50 mg total) by mouth daily for 5 days       Start Date: 5/24/2021 End Date: 5/29/2021       Order Dose: 50 mg       Quantity: 5 tablet    Refills: 0     No discharge procedures on file      PDMP Review     None          ED Provider  Electronically Signed by           Xochilt Goldberg DO  05/24/21 1235

## 2021-05-25 ENCOUNTER — VBI (OUTPATIENT)
Dept: FAMILY MEDICINE CLINIC | Facility: CLINIC | Age: 26
End: 2021-05-25

## 2021-05-25 LAB
ATRIAL RATE: 62 BPM
P AXIS: 17 DEGREES
PR INTERVAL: 160 MS
QRS AXIS: -4 DEGREES
QRSD INTERVAL: 100 MS
QT INTERVAL: 402 MS
QTC INTERVAL: 408 MS
T WAVE AXIS: 5 DEGREES
VENTRICULAR RATE: 62 BPM

## 2021-05-25 PROCEDURE — 93010 ELECTROCARDIOGRAM REPORT: CPT | Performed by: INTERNAL MEDICINE

## 2021-05-25 NOTE — TELEPHONE ENCOUNTER
Jeanine Saldana    ED Visit Information     Ed visit date: 5/24/21  Diagnosis Description: ABDOMINAL PAIN  In Network? Yes Moira Steward  Discharge status: Home  Discharged with meds ? Yes  Number of ED visits to date: 2  ED Severity:N/A     Outreach Information    Outreach successful: Yes 1  Date letter mailed:N/A  Date [address]  DECLINED  Transportation issues ?  No    Value [employer]

## 2021-07-15 ENCOUNTER — TELEPHONE (OUTPATIENT)
Dept: NEUROLOGY | Facility: CLINIC | Age: 26
End: 2021-07-15

## 2021-09-27 ENCOUNTER — HOSPITAL ENCOUNTER (EMERGENCY)
Facility: HOSPITAL | Age: 26
Discharge: HOME | End: 2021-09-27
Attending: EMERGENCY MEDICINE | Admitting: EMERGENCY MEDICINE
Payer: COMMERCIAL

## 2021-09-27 VITALS
SYSTOLIC BLOOD PRESSURE: 167 MMHG | RESPIRATION RATE: 18 BRPM | TEMPERATURE: 97.9 F | WEIGHT: 187 LBS | HEART RATE: 87 BPM | OXYGEN SATURATION: 98 % | BODY MASS INDEX: 27.62 KG/M2 | DIASTOLIC BLOOD PRESSURE: 92 MMHG

## 2021-09-27 DIAGNOSIS — M54.9 BACK PAIN: Primary | ICD-10-CM

## 2021-09-27 PROCEDURE — 96372 THER/PROPH/DIAG INJ SC/IM: CPT

## 2021-09-27 PROCEDURE — 99284 EMERGENCY DEPT VISIT MOD MDM: CPT | Performed by: EMERGENCY MEDICINE

## 2021-09-27 PROCEDURE — 99283 EMERGENCY DEPT VISIT LOW MDM: CPT

## 2021-09-27 RX ORDER — KETOROLAC TROMETHAMINE 30 MG/ML
30 INJECTION, SOLUTION INTRAMUSCULAR; INTRAVENOUS ONCE
Status: COMPLETED | OUTPATIENT
Start: 2021-09-27 | End: 2021-09-27

## 2021-09-27 RX ADMIN — KETOROLAC TROMETHAMINE 30 MG: 30 INJECTION, SOLUTION INTRAMUSCULAR at 04:17

## 2021-09-29 ENCOUNTER — VBI (OUTPATIENT)
Dept: FAMILY MEDICINE CLINIC | Facility: CLINIC | Age: 26
End: 2021-09-29

## 2021-09-29 NOTE — TELEPHONE ENCOUNTER
Gisela Hughes    ED Visit Information     Ed visit date: 09/27/2021  Diagnosis Description: Back pain  In Network? Yes Murray Bennett  Discharge status: Home  Discharged with meds ? No  Number of ED visits to date: 5  ED Severity:NA     Outreach Information    Outreach successful: Yes 1  Date letter mailed:NA  Date Finalized:09/29/2021    Care Coordination    Follow up appointment with pcp: no no  Transportation issues ? NA    Value [employer] Outreach    S/W patient who states everything is fine  He ran out of his medication he was given an injection of Toradol in ED  No follow up appointment needed

## 2021-11-05 ENCOUNTER — TELEPHONE (OUTPATIENT)
Dept: FAMILY MEDICINE CLINIC | Facility: HOSPITAL | Age: 26
End: 2021-11-05

## 2021-11-05 ENCOUNTER — OFFICE VISIT (OUTPATIENT)
Dept: FAMILY MEDICINE CLINIC | Facility: CLINIC | Age: 26
End: 2021-11-05
Payer: COMMERCIAL

## 2021-11-05 VITALS
HEIGHT: 69 IN | WEIGHT: 186 LBS | RESPIRATION RATE: 20 BRPM | BODY MASS INDEX: 27.55 KG/M2 | SYSTOLIC BLOOD PRESSURE: 122 MMHG | HEART RATE: 94 BPM | DIASTOLIC BLOOD PRESSURE: 82 MMHG | OXYGEN SATURATION: 99 % | TEMPERATURE: 98.2 F

## 2021-11-05 DIAGNOSIS — J20.9 ACUTE BRONCHITIS, UNSPECIFIED ORGANISM: Primary | ICD-10-CM

## 2021-11-05 DIAGNOSIS — B34.9 VIRAL INFECTION, UNSPECIFIED: ICD-10-CM

## 2021-11-05 PROCEDURE — 1036F TOBACCO NON-USER: CPT | Performed by: NURSE PRACTITIONER

## 2021-11-05 PROCEDURE — U0005 INFEC AGEN DETEC AMPLI PROBE: HCPCS | Performed by: NURSE PRACTITIONER

## 2021-11-05 PROCEDURE — 99213 OFFICE O/P EST LOW 20 MIN: CPT | Performed by: NURSE PRACTITIONER

## 2021-11-05 PROCEDURE — 3008F BODY MASS INDEX DOCD: CPT | Performed by: NURSE PRACTITIONER

## 2021-11-05 PROCEDURE — 3725F SCREEN DEPRESSION PERFORMED: CPT | Performed by: NURSE PRACTITIONER

## 2021-11-05 PROCEDURE — U0003 INFECTIOUS AGENT DETECTION BY NUCLEIC ACID (DNA OR RNA); SEVERE ACUTE RESPIRATORY SYNDROME CORONAVIRUS 2 (SARS-COV-2) (CORONAVIRUS DISEASE [COVID-19]), AMPLIFIED PROBE TECHNIQUE, MAKING USE OF HIGH THROUGHPUT TECHNOLOGIES AS DESCRIBED BY CMS-2020-01-R: HCPCS | Performed by: NURSE PRACTITIONER

## 2021-11-05 RX ORDER — TOFACITINIB 11 MG/1
TABLET, FILM COATED, EXTENDED RELEASE ORAL DAILY
COMMUNITY
End: 2022-06-01

## 2021-11-05 RX ORDER — BENZONATATE 200 MG/1
200 CAPSULE ORAL 3 TIMES DAILY PRN
Qty: 20 CAPSULE | Refills: 0 | Status: ON HOLD | OUTPATIENT
Start: 2021-11-05 | End: 2022-01-04 | Stop reason: CLARIF

## 2021-11-05 RX ORDER — AZITHROMYCIN 250 MG/1
TABLET, FILM COATED ORAL
Qty: 6 TABLET | Refills: 0 | Status: SHIPPED | OUTPATIENT
Start: 2021-11-05 | End: 2021-11-10

## 2021-11-05 RX ORDER — DULOXETIN HYDROCHLORIDE 30 MG/1
CAPSULE, DELAYED RELEASE ORAL DAILY
COMMUNITY
Start: 2021-10-26 | End: 2022-03-22 | Stop reason: SDUPTHER

## 2021-11-06 ENCOUNTER — TELEPHONE (OUTPATIENT)
Dept: FAMILY MEDICINE CLINIC | Facility: CLINIC | Age: 26
End: 2021-11-06

## 2021-11-06 LAB — SARS-COV-2 RNA RESP QL NAA+PROBE: NEGATIVE

## 2022-01-02 ENCOUNTER — APPOINTMENT (EMERGENCY)
Dept: RADIOLOGY | Facility: HOSPITAL | Age: 27
DRG: 872 | End: 2022-01-02
Payer: COMMERCIAL

## 2022-01-02 ENCOUNTER — HOSPITAL ENCOUNTER (INPATIENT)
Facility: HOSPITAL | Age: 27
LOS: 2 days | Discharge: HOME | DRG: 872 | End: 2022-01-04
Attending: EMERGENCY MEDICINE | Admitting: FAMILY MEDICINE
Payer: COMMERCIAL

## 2022-01-02 DIAGNOSIS — K51.00: ICD-10-CM

## 2022-01-02 DIAGNOSIS — K52.9 ENTERITIS: ICD-10-CM

## 2022-01-02 DIAGNOSIS — R10.9 ABDOMINAL PAIN: Primary | ICD-10-CM

## 2022-01-02 PROBLEM — A41.9 SEPSIS (HCC): Status: ACTIVE | Noted: 2022-01-02

## 2022-01-02 PROBLEM — R79.89 ELEVATED LFTS: Status: ACTIVE | Noted: 2022-01-02

## 2022-01-02 PROBLEM — M45.9 ANKYLOSING SPONDYLITIS (HCC): Status: ACTIVE | Noted: 2022-01-02

## 2022-01-02 LAB
ALBUMIN SERPL BCP-MCNC: 4.6 G/DL (ref 3.5–5)
ALP SERPL-CCNC: 122 U/L (ref 46–116)
ALT SERPL W P-5'-P-CCNC: 82 U/L (ref 12–78)
ANION GAP SERPL CALCULATED.3IONS-SCNC: 14 MMOL/L (ref 4–13)
AST SERPL W P-5'-P-CCNC: 37 U/L (ref 5–45)
BACTERIA UR QL AUTO: ABNORMAL /HPF
BASOPHILS # BLD AUTO: 0.13 THOUSANDS/ΜL (ref 0–0.1)
BASOPHILS NFR BLD AUTO: 0 % (ref 0–1)
BILIRUB SERPL-MCNC: 0.87 MG/DL (ref 0.2–1)
BILIRUB UR QL STRIP: NEGATIVE
BUN SERPL-MCNC: 22 MG/DL (ref 5–25)
CALCIUM SERPL-MCNC: 10.2 MG/DL (ref 8.3–10.1)
CHLORIDE SERPL-SCNC: 97 MMOL/L (ref 100–108)
CK MB SERPL-MCNC: 1.5 NG/ML (ref 0–5)
CK MB SERPL-MCNC: <1 % (ref 0–2.5)
CK SERPL-CCNC: 172 U/L (ref 39–308)
CLARITY UR: CLEAR
CO2 SERPL-SCNC: 25 MMOL/L (ref 21–32)
COLOR UR: YELLOW
CREAT SERPL-MCNC: 2.58 MG/DL (ref 0.6–1.3)
CRP SERPL QL: 16.7 MG/L
EOSINOPHIL # BLD AUTO: 0.18 THOUSAND/ΜL (ref 0–0.61)
EOSINOPHIL NFR BLD AUTO: 1 % (ref 0–6)
ERYTHROCYTE [DISTWIDTH] IN BLOOD BY AUTOMATED COUNT: 19.3 % (ref 11.6–15.1)
ERYTHROCYTE [SEDIMENTATION RATE] IN BLOOD: 23 MM/HOUR (ref 0–14)
FLUAV RNA RESP QL NAA+PROBE: NEGATIVE
FLUBV RNA RESP QL NAA+PROBE: NEGATIVE
GFR SERPL CREATININE-BSD FRML MDRD: 32 ML/MIN/1.73SQ M
GLUCOSE SERPL-MCNC: 134 MG/DL (ref 65–140)
GLUCOSE UR STRIP-MCNC: NEGATIVE MG/DL
HCT VFR BLD AUTO: 51.4 % (ref 36.5–49.3)
HGB BLD-MCNC: 15.3 G/DL (ref 12–17)
HGB UR QL STRIP.AUTO: NEGATIVE
HYALINE CASTS #/AREA URNS LPF: ABNORMAL /LPF
IMM GRANULOCYTES # BLD AUTO: 0.2 THOUSAND/UL (ref 0–0.2)
IMM GRANULOCYTES NFR BLD AUTO: 1 % (ref 0–2)
KETONES UR STRIP-MCNC: NEGATIVE MG/DL
LACTATE SERPL-SCNC: 1.3 MMOL/L (ref 0.5–2)
LACTATE SERPL-SCNC: 3.5 MMOL/L (ref 0.5–2)
LEUKOCYTE ESTERASE UR QL STRIP: NEGATIVE
LIPASE SERPL-CCNC: 84 U/L (ref 73–393)
LYMPHOCYTES # BLD AUTO: 1.21 THOUSANDS/ΜL (ref 0.6–4.47)
LYMPHOCYTES NFR BLD AUTO: 4 % (ref 14–44)
MAGNESIUM SERPL-MCNC: 2 MG/DL (ref 1.6–2.6)
MCH RBC QN AUTO: 19.1 PG (ref 26.8–34.3)
MCHC RBC AUTO-ENTMCNC: 29.8 G/DL (ref 31.4–37.4)
MCV RBC AUTO: 64 FL (ref 82–98)
MONOCYTES # BLD AUTO: 1.52 THOUSAND/ΜL (ref 0.17–1.22)
MONOCYTES NFR BLD AUTO: 5 % (ref 4–12)
NEUTROPHILS # BLD AUTO: 27.14 THOUSANDS/ΜL (ref 1.85–7.62)
NEUTS SEG NFR BLD AUTO: 89 % (ref 43–75)
NITRITE UR QL STRIP: NEGATIVE
NON-SQ EPI CELLS URNS QL MICRO: ABNORMAL /HPF
NRBC BLD AUTO-RTO: 0 /100 WBCS
PH UR STRIP.AUTO: 5.5 [PH]
PLATELET # BLD AUTO: 546 THOUSANDS/UL (ref 149–390)
PMV BLD AUTO: 8.4 FL (ref 8.9–12.7)
POTASSIUM SERPL-SCNC: 4.4 MMOL/L (ref 3.5–5.3)
PROT SERPL-MCNC: 9.6 G/DL (ref 6.4–8.2)
PROT UR STRIP-MCNC: ABNORMAL MG/DL
RBC # BLD AUTO: 8 MILLION/UL (ref 3.88–5.62)
RBC #/AREA URNS AUTO: ABNORMAL /HPF
RSV RNA RESP QL NAA+PROBE: NEGATIVE
SARS-COV-2 RNA RESP QL NAA+PROBE: NEGATIVE
SODIUM SERPL-SCNC: 136 MMOL/L (ref 136–145)
SP GR UR STRIP.AUTO: <=1.005 (ref 1–1.03)
UROBILINOGEN UR QL STRIP.AUTO: 0.2 E.U./DL
WBC # BLD AUTO: 29.85 THOUSAND/UL (ref 4.31–10.16)
WBC #/AREA URNS AUTO: ABNORMAL /HPF

## 2022-01-02 PROCEDURE — 99223 1ST HOSP IP/OBS HIGH 75: CPT | Performed by: FAMILY MEDICINE

## 2022-01-02 PROCEDURE — 96376 TX/PRO/DX INJ SAME DRUG ADON: CPT

## 2022-01-02 PROCEDURE — 87209 SMEAR COMPLEX STAIN: CPT | Performed by: FAMILY MEDICINE

## 2022-01-02 PROCEDURE — 83605 ASSAY OF LACTIC ACID: CPT | Performed by: EMERGENCY MEDICINE

## 2022-01-02 PROCEDURE — 74177 CT ABD & PELVIS W/CONTRAST: CPT

## 2022-01-02 PROCEDURE — 0241U HB NFCT DS VIR RESP RNA 4 TRGT: CPT | Performed by: EMERGENCY MEDICINE

## 2022-01-02 PROCEDURE — 87040 BLOOD CULTURE FOR BACTERIA: CPT | Performed by: EMERGENCY MEDICINE

## 2022-01-02 PROCEDURE — 82550 ASSAY OF CK (CPK): CPT | Performed by: EMERGENCY MEDICINE

## 2022-01-02 PROCEDURE — G1004 CDSM NDSC: HCPCS

## 2022-01-02 PROCEDURE — 36415 COLL VENOUS BLD VENIPUNCTURE: CPT | Performed by: EMERGENCY MEDICINE

## 2022-01-02 PROCEDURE — 82553 CREATINE MB FRACTION: CPT | Performed by: EMERGENCY MEDICINE

## 2022-01-02 PROCEDURE — 85652 RBC SED RATE AUTOMATED: CPT | Performed by: FAMILY MEDICINE

## 2022-01-02 PROCEDURE — 80053 COMPREHEN METABOLIC PANEL: CPT | Performed by: EMERGENCY MEDICINE

## 2022-01-02 PROCEDURE — 83605 ASSAY OF LACTIC ACID: CPT | Performed by: NURSE PRACTITIONER

## 2022-01-02 PROCEDURE — 96361 HYDRATE IV INFUSION ADD-ON: CPT

## 2022-01-02 PROCEDURE — 81001 URINALYSIS AUTO W/SCOPE: CPT | Performed by: EMERGENCY MEDICINE

## 2022-01-02 PROCEDURE — 87177 OVA AND PARASITES SMEARS: CPT | Performed by: FAMILY MEDICINE

## 2022-01-02 PROCEDURE — 87505 NFCT AGENT DETECTION GI: CPT | Performed by: FAMILY MEDICINE

## 2022-01-02 PROCEDURE — 99285 EMERGENCY DEPT VISIT HI MDM: CPT | Performed by: EMERGENCY MEDICINE

## 2022-01-02 PROCEDURE — 96374 THER/PROPH/DIAG INJ IV PUSH: CPT

## 2022-01-02 PROCEDURE — 86140 C-REACTIVE PROTEIN: CPT | Performed by: NURSE PRACTITIONER

## 2022-01-02 PROCEDURE — 99253 IP/OBS CNSLTJ NEW/EST LOW 45: CPT | Performed by: SURGERY

## 2022-01-02 PROCEDURE — 96375 TX/PRO/DX INJ NEW DRUG ADDON: CPT

## 2022-01-02 PROCEDURE — 85025 COMPLETE CBC W/AUTO DIFF WBC: CPT | Performed by: EMERGENCY MEDICINE

## 2022-01-02 PROCEDURE — 83735 ASSAY OF MAGNESIUM: CPT | Performed by: EMERGENCY MEDICINE

## 2022-01-02 PROCEDURE — 83690 ASSAY OF LIPASE: CPT | Performed by: EMERGENCY MEDICINE

## 2022-01-02 PROCEDURE — 87493 C DIFF AMPLIFIED PROBE: CPT | Performed by: FAMILY MEDICINE

## 2022-01-02 PROCEDURE — 99285 EMERGENCY DEPT VISIT HI MDM: CPT

## 2022-01-02 PROCEDURE — 99214 OFFICE O/P EST MOD 30 MIN: CPT | Performed by: INTERNAL MEDICINE

## 2022-01-02 RX ORDER — ONDANSETRON 2 MG/ML
4 INJECTION INTRAMUSCULAR; INTRAVENOUS ONCE
Status: COMPLETED | OUTPATIENT
Start: 2022-01-02 | End: 2022-01-02

## 2022-01-02 RX ORDER — FENTANYL CITRATE 50 UG/ML
50 INJECTION, SOLUTION INTRAMUSCULAR; INTRAVENOUS ONCE
Status: COMPLETED | OUTPATIENT
Start: 2022-01-02 | End: 2022-01-02

## 2022-01-02 RX ORDER — ACETAMINOPHEN 325 MG/1
650 TABLET ORAL ONCE
Status: COMPLETED | OUTPATIENT
Start: 2022-01-02 | End: 2022-01-02

## 2022-01-02 RX ORDER — ONDANSETRON 2 MG/ML
4 INJECTION INTRAMUSCULAR; INTRAVENOUS EVERY 6 HOURS PRN
Status: DISCONTINUED | OUTPATIENT
Start: 2022-01-02 | End: 2022-01-04 | Stop reason: HOSPADM

## 2022-01-02 RX ORDER — PROMETHAZINE HYDROCHLORIDE 25 MG/ML
25 INJECTION, SOLUTION INTRAMUSCULAR; INTRAVENOUS EVERY 6 HOURS PRN
Status: DISCONTINUED | OUTPATIENT
Start: 2022-01-02 | End: 2022-01-04 | Stop reason: HOSPADM

## 2022-01-02 RX ORDER — PROMETHAZINE HYDROCHLORIDE 25 MG/ML
25 INJECTION, SOLUTION INTRAMUSCULAR; INTRAVENOUS ONCE
Status: COMPLETED | OUTPATIENT
Start: 2022-01-02 | End: 2022-01-02

## 2022-01-02 RX ORDER — SODIUM CHLORIDE, SODIUM LACTATE, POTASSIUM CHLORIDE, CALCIUM CHLORIDE 600; 310; 30; 20 MG/100ML; MG/100ML; MG/100ML; MG/100ML
100 INJECTION, SOLUTION INTRAVENOUS CONTINUOUS
Status: DISCONTINUED | OUTPATIENT
Start: 2022-01-02 | End: 2022-01-04 | Stop reason: HOSPADM

## 2022-01-02 RX ORDER — DULOXETIN HYDROCHLORIDE 30 MG/1
30 CAPSULE, DELAYED RELEASE ORAL DAILY
Status: DISCONTINUED | OUTPATIENT
Start: 2022-01-02 | End: 2022-01-04 | Stop reason: HOSPADM

## 2022-01-02 RX ORDER — HYDROMORPHONE HCL/PF 1 MG/ML
1 SYRINGE (ML) INJECTION EVERY 4 HOURS PRN
Status: DISCONTINUED | OUTPATIENT
Start: 2022-01-02 | End: 2022-01-04 | Stop reason: HOSPADM

## 2022-01-02 RX ORDER — HYDROMORPHONE HCL/PF 1 MG/ML
1 SYRINGE (ML) INJECTION ONCE
Status: COMPLETED | OUTPATIENT
Start: 2022-01-02 | End: 2022-01-02

## 2022-01-02 RX ORDER — HYDROMORPHONE HCL/PF 1 MG/ML
0.5 SYRINGE (ML) INJECTION EVERY 4 HOURS PRN
Status: DISCONTINUED | OUTPATIENT
Start: 2022-01-02 | End: 2022-01-04 | Stop reason: HOSPADM

## 2022-01-02 RX ADMIN — HYDROMORPHONE HYDROCHLORIDE 1 MG: 1 INJECTION, SOLUTION INTRAMUSCULAR; INTRAVENOUS; SUBCUTANEOUS at 06:30

## 2022-01-02 RX ADMIN — FENTANYL CITRATE 50 MCG: 50 INJECTION, SOLUTION INTRAMUSCULAR; INTRAVENOUS at 07:25

## 2022-01-02 RX ADMIN — HYDROMORPHONE HYDROCHLORIDE 1 MG: 1 INJECTION, SOLUTION INTRAMUSCULAR; INTRAVENOUS; SUBCUTANEOUS at 15:41

## 2022-01-02 RX ADMIN — SODIUM CHLORIDE 1000 ML: 0.9 INJECTION, SOLUTION INTRAVENOUS at 06:30

## 2022-01-02 RX ADMIN — HYDROMORPHONE HYDROCHLORIDE 1 MG: 1 INJECTION, SOLUTION INTRAMUSCULAR; INTRAVENOUS; SUBCUTANEOUS at 21:04

## 2022-01-02 RX ADMIN — PROMETHAZINE HYDROCHLORIDE 25 MG: 25 INJECTION INTRAMUSCULAR; INTRAVENOUS at 13:19

## 2022-01-02 RX ADMIN — SODIUM CHLORIDE 1000 ML: 0.9 INJECTION, SOLUTION INTRAVENOUS at 08:05

## 2022-01-02 RX ADMIN — SODIUM CHLORIDE, SODIUM LACTATE, POTASSIUM CHLORIDE, AND CALCIUM CHLORIDE 150 ML/HR: .6; .31; .03; .02 INJECTION, SOLUTION INTRAVENOUS at 16:03

## 2022-01-02 RX ADMIN — SODIUM CHLORIDE, SODIUM LACTATE, POTASSIUM CHLORIDE, AND CALCIUM CHLORIDE 150 ML/HR: .6; .31; .03; .02 INJECTION, SOLUTION INTRAVENOUS at 13:20

## 2022-01-02 RX ADMIN — DULOXETINE HYDROCHLORIDE 30 MG: 30 CAPSULE, DELAYED RELEASE ORAL at 13:20

## 2022-01-02 RX ADMIN — SODIUM CHLORIDE 1000 ML: 0.9 INJECTION, SOLUTION INTRAVENOUS at 07:10

## 2022-01-02 RX ADMIN — HYDROMORPHONE HYDROCHLORIDE 1 MG: 1 INJECTION, SOLUTION INTRAMUSCULAR; INTRAVENOUS; SUBCUTANEOUS at 08:06

## 2022-01-02 RX ADMIN — Medication 125 MG: at 18:26

## 2022-01-02 RX ADMIN — PIPERACILLIN AND TAZOBACTAM 3.38 G: 36; 4.5 INJECTION, POWDER, FOR SOLUTION INTRAVENOUS at 21:04

## 2022-01-02 RX ADMIN — HYDROMORPHONE HYDROCHLORIDE 1 MG: 1 INJECTION, SOLUTION INTRAMUSCULAR; INTRAVENOUS; SUBCUTANEOUS at 07:10

## 2022-01-02 RX ADMIN — IOHEXOL 100 ML: 350 INJECTION, SOLUTION INTRAVENOUS at 06:55

## 2022-01-02 RX ADMIN — Medication 125 MG: at 11:19

## 2022-01-02 RX ADMIN — PIPERACILLIN AND TAZOBACTAM 3.38 G: 36; 4.5 INJECTION, POWDER, FOR SOLUTION INTRAVENOUS at 13:45

## 2022-01-02 RX ADMIN — PIPERACILLIN AND TAZOBACTAM 3.38 G: 3; .375 INJECTION, POWDER, LYOPHILIZED, FOR SOLUTION INTRAVENOUS at 08:24

## 2022-01-02 RX ADMIN — ONDANSETRON 4 MG: 2 INJECTION INTRAMUSCULAR; INTRAVENOUS at 11:14

## 2022-01-02 RX ADMIN — HYDROMORPHONE HYDROCHLORIDE 1 MG: 1 INJECTION, SOLUTION INTRAMUSCULAR; INTRAVENOUS; SUBCUTANEOUS at 11:14

## 2022-01-02 RX ADMIN — ACETAMINOPHEN 650 MG: 325 TABLET, FILM COATED ORAL at 14:13

## 2022-01-02 RX ADMIN — ONDANSETRON 4 MG: 2 INJECTION INTRAMUSCULAR; INTRAVENOUS at 06:29

## 2022-01-02 RX ADMIN — SODIUM CHLORIDE, SODIUM LACTATE, POTASSIUM CHLORIDE, AND CALCIUM CHLORIDE 200 ML/HR: .6; .31; .03; .02 INJECTION, SOLUTION INTRAVENOUS at 22:21

## 2022-01-02 NOTE — ED NOTES
Dr Toro Espinoza texted by Steven Bird RN regarding patient's temp 101       Lillie Burch RN  01/02/22 9722

## 2022-01-02 NOTE — PLAN OF CARE
Problem: GASTROINTESTINAL - ADULT  Goal: Minimal or absence of nausea and/or vomiting  Description: INTERVENTIONS:  - Administer IV fluids if ordered to ensure adequate hydration  - Maintain NPO status until nausea and vomiting are resolved  - Nasogastric tube if ordered  - Administer ordered antiemetic medications as needed  - Provide nonpharmacologic comfort measures as appropriate  - Advance diet as tolerated, if ordered  - Consider nutrition services referral to assist patient with adequate nutrition and appropriate food choices  Outcome: Progressing  Goal: Maintains or returns to baseline bowel function  Description: INTERVENTIONS:  - Assess bowel function  - Encourage oral fluids to ensure adequate hydration  - Administer IV fluids if ordered to ensure adequate hydration  - Administer ordered medications as needed  - Encourage mobilization and activity  - Consider nutritional services referral to assist patient with adequate nutrition and appropriate food choices  Outcome: Progressing  Goal: Maintains adequate nutritional intake  Description: INTERVENTIONS:  - Monitor percentage of each meal consumed  - Identify factors contributing to decreased intake, treat as appropriate  - Assist with meals as needed  - Monitor I&O, weight, and lab values if indicated  - Obtain nutrition services referral as needed  Outcome: Progressing  Goal: Establish and maintain optimal ostomy function  Description: INTERVENTIONS:  - Assess bowel function  - Encourage oral fluids to ensure adequate hydration  - Administer IV fluids if ordered to ensure adequate hydration   - Administer ordered medications as needed  - Encourage mobilization and activity  - Nutrition services referral to assist patient with appropriate food choices  - Assess stoma site  - Consider wound care consult   Outcome: Progressing  Goal: Oral mucous membranes remain intact  Description: INTERVENTIONS  - Assess oral mucosa and hygiene practices  - Implement preventative oral hygiene regimen  - Implement oral medicated treatments as ordered  - Initiate Nutrition services referral as needed  Outcome: Progressing     Problem: PAIN - ADULT  Goal: Verbalizes/displays adequate comfort level or baseline comfort level  Description: Interventions:  - Encourage patient to monitor pain and request assistance  - Assess pain using appropriate pain scale  - Administer analgesics based on type and severity of pain and evaluate response  - Implement non-pharmacological measures as appropriate and evaluate response  - Consider cultural and social influences on pain and pain management  - Notify physician/advanced practitioner if interventions unsuccessful or patient reports new pain  Outcome: Progressing     Problem: INFECTION - ADULT  Goal: Absence or prevention of progression during hospitalization  Description: INTERVENTIONS:  - Assess and monitor for signs and symptoms of infection  - Monitor lab/diagnostic results  - Monitor all insertion sites, i e  indwelling lines, tubes, and drains  - Monitor endotracheal if appropriate and nasal secretions for changes in amount and color  - Ransom appropriate cooling/warming therapies per order  - Administer medications as ordered  - Instruct and encourage patient and family to use good hand hygiene technique  - Identify and instruct in appropriate isolation precautions for identified infection/condition  Outcome: Progressing  Goal: Absence of fever/infection during neutropenic period  Description: INTERVENTIONS:  - Monitor WBC    Outcome: Progressing     Problem: DISCHARGE PLANNING  Goal: Discharge to home or other facility with appropriate resources  Description: INTERVENTIONS:  - Identify barriers to discharge w/patient and caregiver  - Arrange for needed discharge resources and transportation as appropriate  - Identify discharge learning needs (meds, wound care, etc )  - Arrange for interpretive services to assist at discharge as needed  - Refer to Case Management Department for coordinating discharge planning if the patient needs post-hospital services based on physician/advanced practitioner order or complex needs related to functional status, cognitive ability, or social support system  Outcome: Progressing     Problem: Knowledge Deficit  Goal: Patient/family/caregiver demonstrates understanding of disease process, treatment plan, medications, and discharge instructions  Description: Complete learning assessment and assess knowledge base    Interventions:  - Provide teaching at level of understanding  - Provide teaching via preferred learning methods  Outcome: Progressing

## 2022-01-02 NOTE — CONSULTS
Consultation - [facility] Gastroenterology   Mina Marion 27 y o  male MRN: [id number]  Unit/Bed#: ED CT1 Encounter: [id number]        Inpatient consult to gastroenterology  Consult performed by: [place] Barfield  Consult ordered by: Jaime Dover DO          Reason for Consult / Principal Problem:     Enteritis      ASSESSMENT AND PLAN:      1  Enteritis and sepsis in the setting of immunosuppression, hx of total colectomy and J pouch for Ulcerative Colitis: this is a 27year old male with history of ankylosing spondylitis on xeljanz and ulcerative colitis status post proctocolectomy and 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 who presents with acute onset severe burning umbilical pain with associated non bloody diarrhea and vomiting  He met sepsis criteria on admission, creatinine 2 58, elevated lactic acid, and significant leukocytosis with WBC 29, also elevation of ALT, alkaline phosphatase  Normal T  Bili and lipase  CT imaging showed status post colectomy and ileal pouch-anal anastomosis  No evidence of obstruction, but mild bowel wall thickening and mucosal hyperemia in the right lower quadrant small bowel just proximal to the ileal pouch suspicious for an infectious or inflammatory enteritis  Differentials include biliary source, forming abscess, infectious enteritis  No current evidence of perforation  Surgery following      -Hold xeljanz  -Obtain C diff PCR, enteric stool panel, O and P  -Check sed rate/CRP  -Check RUQ US  -Follow up blood cultures  -Continue supportive care for sepsis per primary team  -Continue pain control/anti emetics as needed  -Remain NPO w/ sips of clears as bebe   -Continue Zosyn, will add oral vancomycin due to history of C diff  -Consider ID consultation  -Follow CBC,CMP    Thank you for the consultation   Patient see and examined with Dr Nick Alexander      ______________________________________________________________________    HPI:  Mina Marion is a 27year old male with history of Ankylosing spondylitis on Xeljanz, ulcerative colitis status post proctocolectomy and 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 who presents with progressively worsening abdominal pain which began at 1a m, pain became severe and associated with vomiting and diarrhea and he presented to the ED for further evaluation  In the ED was afebrile, slightly tachycardic, normotensive  Workup revealed SARAHI with creatinine 2 58, lactic acidosis, and significant leukocytosis with WBC 29 meeting sepsis criteria  Also elevated LFTs, AST 37, ALT 82, alkaline phosphatase 122, total bilirubin normal   Flu/COVID screen negative  CT abdomen pelvis with IV contrast showed status post colectomy and ileal pouch-anal anastomosis  No evidence of obstruction, but mild bowel wall thickening and mucosal hyperemia in the right lower quadrant small bowel just proximal to the ileal pouch suspicious for an infectious or inflammatory enteritis  Also a stable 1 2 cm aortocaval node and 1 5 cm right external iliac node  He was given 3 L normal saline bolus, Zofran, Zosyn, and Dilaudid in the ED  Blood cultures are pending  Surgery and GI consult for further evaluation  He reports symptoms similar to this last time he had C diff back in 2015, denies any recent outpatient antibiotic use  He's currently having rigors, burning umbilical pain  He was hospitalized in May at [facility] due to recurrent constipation and nausea with CT imaging findings consistent with J pouch stricture/kink without overt clinical obstruction  He underwent flexible pouchoscopy at that time which showed in a stenosis 3 cm from anal verge, no stricture  Large and dilated pouch with mild pouchitis  Heavy stool burden removed with suction and irrigation  Proximal small bowel unable to be visualized due to sharp angle of pouch   He followed up with colorectal at [facility] after hospitalization and there were plans for anorectal manometry and stage 3 redo procedure when ready  REVIEW OF SYSTEMS:    CONSTITUTIONAL: Denies any fever, +rigors  HEENT: No earache or tinnitus  Denies hearing loss or visual disturbances  CARDIOVASCULAR: No chest pain or palpitations  RESPIRATORY: Denies any cough, hemoptysis, shortness of breath or dyspnea on exertion  GASTROINTESTINAL: As noted in the History of Present Illness  GENITOURINARY: No problems with urination  Denies any hematuria or dysuria  NEUROLOGIC: No dizziness or vertigo, denies headaches  MUSCULOSKELETAL: Denies any muscle or joint pain  SKIN: Denies skin rashes or itching  ENDOCRINE: Denies excessive thirst  Denies intolerance to heat or cold  PSYCHOSOCIAL: Denies depression or anxiety  Denies any recent memory loss  Historical Information   Past Medical History:   Diagnosis Date    Anxiety     Bowel obstruction ([place] [address] )     Clostridium difficile colitis 9/13/2018    Colitis     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[place] [address] ) 9/13/2018    Pancreatitis     Ulcerative colitis ([place] [address] )      Past Surgical History:   Procedure Laterality Date    COLECTOMY TOTAL      with ileal pouch and anastemosis    TOTAL COLECTOMY       Social History   Social History     Substance and Sexual Activity   Alcohol Use Yes    Alcohol/week: 3 0 standard drinks    Types: 3 Standard drinks or equivalent per week    Comment: BARELY PT REPORTS     Social History     Substance and Sexual Activity   Drug Use No     Social History     Tobacco Use   Smoking Status Never Smoker   Smokeless Tobacco Never Used     History reviewed  No pertinent family history  Meds/Allergies     (Not in a hospital admission)    No current facility-administered medications for this encounter         Allergies   Allergen Reactions    Mesalamine GI Intolerance     Other reaction(s): Pancreatitis  Annotation - [id number]: pancreatitis           Objective     Blood pressure 120/61, pulse (!) 106, temperature 97 9 °F (36 6 °C), temperature source Tympanic, resp  rate (!) 25, weight 82 5 kg (181 lb 14 1 oz), SpO2 95 %  Body mass index is 26 86 kg/m²  Intake/Output Summary (Last 24 hours) at 1/2/2022 5278  Last data filed at 1/2/2022 1639  Gross per 24 hour   Intake 2000 ml   Output --   Net 2000 ml         PHYSICAL EXAM:      General Appearance:   Alert, cooperative, toxic appearing; + rigors   HEENT:   Normocephalic, atraumatic, anicteric  Neck:  Supple, symmetrical, trachea midline   Lungs:   Clear to auscultation bilaterally; no rales, rhonchi or wheezing; respirations unlabored    Heart[de-identified]   Regular rate and rhythm; no murmur, rub, or gallop     Abdomen:   Soft, umbilical area tender, non-distended; normal bowel sounds; no masses, no organomegaly    Genitalia:   Deferred    Rectal:   Deferred    Extremities:  No cyanosis, clubbing or edema    Pulses:  2+ and symmetric all extremities    Skin:  No jaundice, rashes, or lesions    Lymph nodes:  No palpable cervical lymphadenopathy        Lab Results:   Admission on 01/02/2022   Component Date Value    WBC 01/02/2022 29 85*    RBC 01/02/2022 8 00*    Hemoglobin 01/02/2022 15 3     Hematocrit 01/02/2022 51 4*    MCV 01/02/2022 64*    MCH 01/02/2022 19 1*    MCHC 01/02/2022 29 8*    RDW 01/02/2022 19 3*    MPV 01/02/2022 8 4*    Platelets 86/74/0966 546*    nRBC 01/02/2022 0     Neutrophils Relative 01/02/2022 89*    Immat GRANS % 01/02/2022 1     Lymphocytes Relative 01/02/2022 4*    Monocytes Relative 01/02/2022 5     Eosinophils Relative 01/02/2022 1     Basophils Relative 01/02/2022 0     Neutrophils Absolute 01/02/2022 27 14*    Immature Grans Absolute 01/02/2022 0 20     Lymphocytes Absolute 01/02/2022 1 21     Monocytes Absolute 01/02/2022 1 52*    Eosinophils Absolute 01/02/2022 0 18     Basophils Absolute 01/02/2022 0 13*    Sodium 01/02/2022 136     Potassium 01/02/2022 4 4     Chloride 01/02/2022 97*    CO2 01/02/2022 25     ANION GAP 01/02/2022 14*    BUN 01/02/2022 22     Creatinine 01/02/2022 2 58*    Glucose 01/02/2022 134     Calcium 01/02/2022 10 2*    AST 01/02/2022 37     ALT 01/02/2022 82*    Alkaline Phosphatase 01/02/2022 122*    Total Protein 01/02/2022 9 6*    Albumin 01/02/2022 4 6     Total Bilirubin 01/02/2022 0 87     eGFR 01/02/2022 32     Magnesium 01/02/2022 2 0     Lipase 01/02/2022 84     Color, UA 01/02/2022 Yellow     Clarity, UA 01/02/2022 Clear     Specific Gravity, UA 01/02/2022 <=1 005     pH, UA 01/02/2022 5 5     Leukocytes, UA 01/02/2022 Negative     Nitrite, UA 01/02/2022 Negative     Protein, UA 01/02/2022 Trace*    Glucose, UA 01/02/2022 Negative     Ketones, UA 01/02/2022 Negative     Urobilinogen, UA 01/02/2022 0 2     Bilirubin, UA 01/02/2022 Negative     Blood, UA 01/02/2022 Negative     LACTIC ACID 01/02/2022 3 5*    Total CK 01/02/2022 172     SARS-CoV-2 01/02/2022 Negative     INFLUENZA A PCR 01/02/2022 Negative     INFLUENZA B PCR 01/02/2022 Negative     RSV PCR 01/02/2022 Negative     RBC, UA 01/02/2022 0-1     WBC, UA 01/02/2022 1-2     Epithelial Cells 01/02/2022 Occasional     Bacteria, UA 01/02/2022 Occasional     Hyaline Casts, UA 01/02/2022 1-2*       Imaging Studies: I have personally reviewed pertinent imaging studies

## 2022-01-02 NOTE — ED PROVIDER NOTES
History  Chief Complaint   Patient presents with    Abdominal Pain     pt started with abd pain yesterday, history of SBO and colitis     29-year-old male presents with generalized abdominal pain that started at 1:00 a m  This morning progressively getting worse currently 10/10 patient is pale and diaphoretic lightheaded from the pain  He denies any fevers chills dysuria urgency frequency had 2 episodes of nonbilious vomiting this morning no good diarrhea constipation history of ulcerative colitis status post colectomy several years ago  Last admission to the ER for the same complaint was diagnosed as enteritis discharged on antibiotics steroids and pain medications  The abdominal pain is sharp continuous currently 10/10 nothing makes it better worse  History provided by:  Patient   used: No        Prior to Admission Medications   Prescriptions Last Dose Informant Patient Reported? Taking?    DULoxetine (CYMBALTA) 30 mg delayed release capsule   Yes No   Sig: daily   Tofacitinib Citrate ER (Xeljanz XR) 11 MG TB24   Yes No   Sig: Take by mouth daily   adalimumab (HUMIRA) 40 mg/0 8 mL PSKT   Yes No   Sig: Inject 40 mg under the skin every 14 (fourteen) days   Patient not taking: Reported on 11/5/2021   benzonatate (TESSALON) 200 MG capsule   No No   Sig: Take 1 capsule (200 mg total) by mouth 3 (three) times a day as needed for cough   cyclobenzaprine (FLEXERIL) 10 mg tablet   No No   Sig: Take 1 tablet (10 mg total) by mouth 2 (two) times a day as needed for muscle spasms   Patient not taking: Reported on 11/5/2021   naproxen (EC NAPROSYN) 500 MG EC tablet   Yes No   Sig: Take 500 mg by mouth Three times a day   Patient not taking: Reported on 11/5/2021      Facility-Administered Medications: None       Past Medical History:   Diagnosis Date    Anxiety     Bowel obstruction (HCC)     Clostridium difficile colitis 9/13/2018    Colitis     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[facility] [address] ) 9/13/2018    Pancreatitis     Ulcerative colitis ([facility] [address] )        Past Surgical History:   Procedure Laterality Date    COLECTOMY TOTAL      with ileal pouch and anastemosis    TOTAL COLECTOMY         History reviewed  No pertinent family history  I have reviewed and agree with the history as documented  E-Cigarette/Vaping    E-Cigarette Use Never User      E-Cigarette/Vaping Substances    Nicotine No     THC No     CBD No     Flavoring No     Other No     Unknown No      Social History     Tobacco Use    Smoking status: Never Smoker    Smokeless tobacco: Never Used   Vaping Use    Vaping Use: Never used   Substance Use Topics    Alcohol use: Yes     Alcohol/week: 3 0 standard drinks     Types: 3 Standard drinks or equivalent per week     Comment: BARELY PT REPORTS    Drug use: No       Review of Systems   Constitutional: Negative  HENT: Negative  Eyes: Negative  Respiratory: Negative  Cardiovascular: Negative  Gastrointestinal: Positive for abdominal pain, nausea and vomiting  Endocrine: Negative  Genitourinary: Negative  Musculoskeletal: Negative  Skin: Negative  Allergic/Immunologic: Negative  Neurological: Negative  Hematological: Negative  Psychiatric/Behavioral: Negative  All other systems reviewed and are negative  Physical Exam  Physical Exam  Vitals and nursing note reviewed  Constitutional:       Appearance: Normal appearance  HENT:      Head: Normocephalic and atraumatic  Nose: Nose normal       Mouth/Throat:      Mouth: Mucous membranes are moist    Eyes:      Extraocular Movements: Extraocular movements intact  Pupils: Pupils are equal, round, and reactive to light  Cardiovascular:      Rate and Rhythm: Normal rate and regular rhythm  Pulses: Normal pulses  Pulmonary:      Effort: Pulmonary effort is normal       Breath sounds: Normal breath sounds  Abdominal:      General: Abdomen is flat   Bowel sounds are normal  There is no distension  Palpations: Abdomen is soft  There is no mass  Tenderness: There is generalized abdominal tenderness  There is no right CVA tenderness, left CVA tenderness, guarding or rebound  Negative signs include Beatty's sign, Rovsing's sign, McBurney's sign, psoas sign and obturator sign  Hernia: No hernia is present  Musculoskeletal:         General: Normal range of motion  Cervical back: Normal range of motion and neck supple  Skin:     General: Skin is warm and dry  Capillary Refill: Capillary refill takes less than 2 seconds  Neurological:      General: No focal deficit present  Mental Status: He is alert and oriented to person, place, and time     Psychiatric:         Mood and Affect: Mood normal          Behavior: Behavior normal          Vital Signs  ED Triage Vitals [01/02/22 0626]   Temperature Pulse Respirations Blood Pressure SpO2   97 9 °F (36 6 °C) 100 20 105/73 99 %      Temp Source Heart Rate Source Patient Position - Orthostatic VS BP Location FiO2 (%)   Tympanic Monitor Lying Left arm --      Pain Score       10 - Worst Possible Pain           Vitals:    01/02/22 0626 01/02/22 0633 01/02/22 0700 01/02/22 0715   BP: 105/73 99/52 105/53 117/60   Pulse: 100 103 102 97   Patient Position - Orthostatic VS: Lying Lying           Visual Acuity      ED Medications  Medications   sodium chloride 0 9 % bolus 1,000 mL (1,000 mL Intravenous New Bag 1/2/22 0805)   sodium chloride 0 9 % bolus 1,000 mL (0 mL Intravenous Stopped 1/2/22 0720)   ondansetron (ZOFRAN) injection 4 mg (4 mg Intravenous Given 1/2/22 0629)   HYDROmorphone (DILAUDID) injection 1 mg (1 mg Intravenous Given 1/2/22 0630)   HYDROmorphone (DILAUDID) injection 1 mg (1 mg Intravenous Given 1/2/22 0710)   sodium chloride 0 9 % bolus 1,000 mL (0 mL Intravenous Stopped 1/2/22 0807)   iohexol (OMNIPAQUE) 350 MG/ML injection (SINGLE-DOSE) 100 mL (100 mL Intravenous Given 1/2/22 0655)   fentanyl citrate (PF) 100 MCG/2ML 50 mcg (50 mcg Intravenous Given 1/2/22 0725)   HYDROmorphone (DILAUDID) injection 1 mg (1 mg Intravenous Given 1/2/22 0806)       Diagnostic Studies  Results Reviewed     Procedure Component Value Units Date/Time    CK (with reflex to MB) [[id number]]     Lab Status: No result Specimen: Blood     Lactic acid [[id number]]  (Abnormal) Collected: 01/02/22 0711    Lab Status: Final result Specimen: Blood from Arm, Right Updated: 01/02/22 0758     LACTIC ACID 3 5 mmol/L     Narrative:      Result may be elevated if tourniquet was used during collection  Lactic acid 2 Hours [[id number]]     Lab Status: No result Specimen: Blood     Blood culture #2 [[id number]] Collected: 01/02/22 0711    Lab Status: In process Specimen: Blood from Arm, Left Updated: 01/02/22 0714    Blood culture #1 [[id number]] Collected: 01/02/22 0711    Lab Status:  In process Specimen: Blood from Arm, Right Updated: 01/02/22 0714    Comprehensive metabolic panel [[id number]]  (Abnormal) Collected: 01/02/22 0630    Lab Status: Final result Specimen: Blood from Arm, Right Updated: 01/02/22 0659     Sodium 136 mmol/L      Potassium 4 4 mmol/L      Chloride 97 mmol/L      CO2 25 mmol/L      ANION GAP 14 mmol/L      BUN 22 mg/dL      Creatinine 2 58 mg/dL      Glucose 134 mg/dL      Calcium 10 2 mg/dL      AST 37 U/L      ALT 82 U/L      Alkaline Phosphatase 122 U/L      Total Protein 9 6 g/dL      Albumin 4 6 g/dL      Total Bilirubin 0 87 mg/dL      eGFR 32 ml/min/1 73sq m     Narrative:      [place] guidelines for Chronic Kidney Disease (CKD):     Stage 1 with normal or high GFR (GFR > 90 mL/min/1 73 square meters)    Stage 2 Mild CKD (GFR = 60-89 mL/min/1 73 square meters)    Stage 3A Moderate CKD (GFR = 45-59 mL/min/1 73 square meters)    Stage 3B Moderate CKD (GFR = 30-44 mL/min/1 73 square meters)    Stage 4 Severe CKD (GFR = 15-29 mL/min/1 73 square meters)    Stage 5 End Stage CKD (GFR <15 mL/min/1 73 square meters)  Note: GFR calculation is accurate only with a steady state creatinine    Magnesium [[id number]]  (Normal) Collected: 01/02/22 0630    Lab Status: Final result Specimen: Blood from Arm, Right Updated: 01/02/22 0659     Magnesium 2 0 mg/dL     Lipase [[id number]]  (Normal) Collected: 01/02/22 0630    Lab Status: Final result Specimen: Blood from Arm, Right Updated: 01/02/22 0659     Lipase 84 u/L     CBC and differential [[id number]]  (Abnormal) Collected: 01/02/22 0630    Lab Status: Final result Specimen: Blood from Arm, Right Updated: 01/02/22 0655     WBC 29 85 Thousand/uL      RBC 8 00 Million/uL      Hemoglobin 15 3 g/dL      Hematocrit 51 4 %      MCV 64 fL      MCH 19 1 pg      MCHC 29 8 g/dL      RDW 19 3 %      MPV 8 4 fL      Platelets 713 Thousands/uL      nRBC 0 /100 WBCs      Neutrophils Relative 89 %      Immat GRANS % 1 %      Lymphocytes Relative 4 %      Monocytes Relative 5 %      Eosinophils Relative 1 %      Basophils Relative 0 %      Neutrophils Absolute 27 14 Thousands/µL      Immature Grans Absolute 0 20 Thousand/uL      Lymphocytes Absolute 1 21 Thousands/µL      Monocytes Absolute 1 52 Thousand/µL      Eosinophils Absolute 0 18 Thousand/µL      Basophils Absolute 0 13 Thousands/µL     UA (URINE) with reflex to Scope [[id number]]     Lab Status: No result Specimen: Urine                  CT abdomen pelvis with contrast   Final Result by Stefanie Morocho MD (01/02 8377)      Patient is status post colectomy and ileal pouch-anal anastomosis  There is no evidence of obstruction, but there is mild bowel wall thickening and mucosal hyperemia in a right lower quadrant small bowel just proximal to the ileal pouch (series 2 images 51-75, and series 601 images 64-83)  This is suspicious for an    infectious or inflammatory enteritis  Again seen is aortocaval and right external iliac adenopathy, which have demonstrated long-term stability           Workstation performed: [id number] Procedures  Procedures         ED Course                                             MDM  Number of Diagnoses or Management Options  Abdominal pain  Enteritis  Diagnosis management comments: Spoke with general surgeon Dr Joyce Fischer and his team   They will consult on the patient in the ER  Patient admitted to the medical service  He will also receive a GI consult inpatient  Pain controlled in the ED with the 3 doses of Dilaudid and 1 dose of fentanyl  2 L of IV fluids given  Abdominal exam done again which was non peritoneal        Amount and/or Complexity of Data Reviewed  Clinical lab tests: ordered and reviewed  Tests in the radiology section of CPT®: ordered and reviewed  Tests in the medicine section of CPT®: ordered and reviewed    Patient Progress  Patient progress: stable      Disposition  Final diagnoses:   Abdominal pain   Enteritis     Time reflects when diagnosis was documented in both MDM as applicable and the Disposition within this note     Time User Action Codes Description Comment    1/2/2022  7:32 AM Michael King Add [R10 9] Abdominal pain     1/2/2022  7:32 AM Michael King Add [K52 9] Enteritis       ED Disposition     ED Disposition Condition Date/Time Comment    Admit Stable Sun Jan 2, 2022  7:32 AM          Follow-up Information    None         Patient's Medications   Discharge Prescriptions    No medications on file       No discharge procedures on file      PDMP Review     None          ED Provider  Electronically Signed by           Jaime Dover DO  01/02/22 9553

## 2022-01-02 NOTE — CONSULTS
Consultation - General Surgery   Nikia Arriola 27 y o  male MRN: [id number]  Unit/Bed#: ED CT1 Encounter: [id number]    Assessment/Plan     Assessment:  1) Sepsis - improving, was tachycardic and tachypneic with elevated lactic acid of 3 5 and leukocytosis of 29,000, currently pulse is 101 and respiration rates have reduced, lactic acid has normalized to 1 3, no repeat white count is been done, uncertain of exact etiology but based on presentation from patient information would suspect abdominal etiology, no surgical indications noted as of yet, patient does have medications at home that suppressed immune system, patient does have some small bowel thickening suggestive of enteritis potentially, possibility of bacterial translocation from enteritis  2) Ulcerative Colitis - patient is status post total colectomy, patient does have some distal ileitis on CT scan as well as some thickening of small intestine but some to be expected if patient is having infectious enteritis  3) History of Total Colectomy with J pouch - patient had in 2012 diagnosis of ulcerative colitis, secondary to the fact that patient had pan colitis in 2015 he did have total colectomy with J-pouch formation and diverting loop ileostomy, patient was just reversed approximately a year ago, patient does get some anal stricture that is treated with prouchoscopy and dilation which helps significantly, does not take Humira  4) Abdominal Pain - slightly improved with admission and pain medication, patient is having burning diffusely across the abdomen, patient does not appear to have signs of obstruction on CT scan, patient is having nausea and vomiting but also having diarrhea, patient's last bowel movement was within the last 4 hours, has a history of C diff colitis but no recent antibiotic history  5) aortocaval and external iliac adenopathy - no testicular lesion palpated on exam    Plan:  1-5)   - no acute surgical intervention indicated as of right now  - continue IV Zosyn  - p o  Vancomycin  - recommend testing for C diff   - p r n  Analgesia  - p r n  Zofran  - serial abdominal exams  - aggressive volume resuscitation efforts  - repeat CBC, BMP, Mag, phos  - GI consult  - continue all medications  - DVT prophylaxis  - NPO into tomorrow    History of Present Illness   HPI:  Jamarcus Moreno is a 27 y o  male  with a past medical history pertinent for total colectomy in 2015, ulcerative colitis in 2012, C diff colitis in 2015, in need for colonoscopy intervention with dilation, immunosuppressed presenting to the acute care surgery team due to abdominal pain  Patient has a longstanding history with abdominal surgeries and autoimmune disease  Patient was diagnosed in 2012 with ulcerative colitis and had waxing and waning symptoms for years  Patient reports that in 2015 he also had a bout of C diff colitis  Patient was treated for C diff colitis with p o  Vancomycin and did recover  Patient however continued to have inflammatory changes to his colon throughout 2015 with pan colitis which was suspected after treatment for C diff colitis to be secondary to his ulcerative colitis  Patient could not get medicinal control of his pan colitis medically and as a result had a total colectomy  Patient had a J-pouch formation with diverting loop ileostomy  Patient had reconnection of J-pouch to remaining small segment of anus and rectum  Patient just within the last year approximately had diverting loop ileostomy reversed  Patient since total colectomy has had less issues with exception of the fact that on occasion he has stricture like process by the anus that requires dilation  Patient reports that his anatomy is deviated from normal and that there is a relatively hard angle to his J-pouch that sometimes does not allow for colonoscopy equipment to traverse    When patient does have pouchoscopy he typically has a dilation which tremendously helps with his routine bowel movements  Patient did not have any changes from the normal up until yesterday  Patient reports that he typically has 15 loose bowel movements a day  Patient was not having any abdominal pain  Patient was not having any obstructive qualities  Patient denies any recent antibiotic history  Patient denies any changes in his medications but does still take some chronic immunosuppressants particularly for his ankylosing spondylitis/chronic back pain  Patient denies any recent sick contacts with similar symptoms such as nausea, vomiting, diarrhea  Patient denies any recent travel outside the country  Patient noticed yesterday evening into approximately 1 the morning he started having the onset of burning diffuse generalized abdominal pain that was constant in nature with profuse nausea and vomiting that he could not count how many times he was nauseated and vomiting  Patient also consistently had diarrhea  Patient does typically have loose bowel movements however this seemed more than his normal volume  Patient instead of having 15 a day had 10 approximately last 10 hours or so  Patient's most recent bowel movement was another loose diarrhea bowel movement approximately 2 hours ago  Patient denies any blood or black in his bowel movements  Patient's pain has improved with Dilaudid dosing but he reports that there is still a burning pain diffusely throughout his abdomen that sometimes even radiates into his groin  Patient does have some sensation of fevers and chills  Patient denies any other symptoms  Patient does not do regular testicular exams but denies any sort of lumps or bumps when showering  Patient denies any dysuria or burning when he urinates  Patient denies any hematuria any urinates  Patient denies any shortness of breath, chest pain, cough, cold, congestion, palpitations, racing heart      Consult to surgery general  Consult performed by: Sofia Saavedra PA-C  Consult ordered by: Cassie King DO  Reason for consult: abdominal pain          Review of Systems   Constitutional: Positive for appetite change, chills and fever  Negative for activity change and fatigue  HENT: Negative for congestion, rhinorrhea and sinus pain  Respiratory: Negative for cough, chest tightness, shortness of breath and wheezing  Cardiovascular: Negative for chest pain and palpitations  Gastrointestinal: Positive for abdominal pain, diarrhea, nausea and vomiting  Negative for abdominal distention and blood in stool  Genitourinary: Negative for difficulty urinating, dysuria and flank pain  Musculoskeletal: Negative for arthralgias and gait problem  Skin: Negative for color change and wound  Neurological: Negative for dizziness, seizures and weakness  Psychiatric/Behavioral: Negative for agitation  The patient is nervous/anxious          Historical Information   Past Medical History:   Diagnosis Date    Anxiety     Bowel obstruction ([place] [address] )     Clostridium difficile colitis 9/13/2018    Colitis     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[place] [address] ) 9/13/2018    Pancreatitis     Ulcerative colitis ([place] [address] )      Past Surgical History:   Procedure Laterality Date    COLECTOMY TOTAL      with ileal pouch and anastemosis    TOTAL COLECTOMY       Social History   Social History     Substance and Sexual Activity   Alcohol Use Yes    Alcohol/week: 3 0 standard drinks    Types: 3 Standard drinks or equivalent per week    Comment: BARELY PT REPORTS     Social History     Substance and Sexual Activity   Drug Use No     E-Cigarette/Vaping    E-Cigarette Use Never User      E-Cigarette/Vaping Substances    Nicotine No     THC No     CBD No     Flavoring No     Other No     Unknown No      Social History     Tobacco Use   Smoking Status Never Smoker   Smokeless Tobacco Never Used     Family History: non-contributory    Meds/Allergies   all current active meds have been reviewed and current meds:   Current Facility-Administered Medications   Medication Dose Route Frequency    vancomycin (VANCOCIN) oral solution 125 mg  125 mg Oral Q6H [address]     Allergies   Allergen Reactions    Mesalamine GI Intolerance     Other reaction(s): Pancreatitis  Annotation - [id number]: pancreatitis       Objective   First Vitals:   Blood Pressure: 105/73 (01/02/22 0626)  Pulse: 100 (01/02/22 0626)  Temperature: 97 9 °F (36 6 °C) (01/02/22 0626)  Temp Source: Tympanic (01/02/22 0626)  Respirations: 20 (01/02/22 0626)  Weight - Scale: 82 5 kg (181 lb 14 1 oz) (01/02/22 0626)  SpO2: 99 % (01/02/22 0626)    Current Vitals:   Blood Pressure: 111/56 (01/02/22 1030)  Pulse: 94 (01/02/22 1000)  Temperature: 97 9 °F (36 6 °C) (01/02/22 0626)  Temp Source: Tympanic (01/02/22 0626)  Respirations: (!) 25 (01/02/22 0815)  Weight - Scale: 82 5 kg (181 lb 14 1 oz) (01/02/22 0626)  SpO2: 96 % (01/02/22 1000)      Intake/Output Summary (Last 24 hours) at 1/2/2022 1100  Last data filed at 1/2/2022 6060  Gross per 24 hour   Intake 3100 ml   Output --   Net 3100 ml       Invasive Devices  Report    Peripheral Intravenous Line            Peripheral IV 01/02/22 Left Antecubital <1 day    Peripheral IV 01/02/22 Right Antecubital <1 day                Physical Exam  Constitutional:       General: He is not in acute distress  Appearance: Normal appearance  He is normal weight  He is ill-appearing  He is not diaphoretic  Interventions: He is not intubated  HENT:      Head: Normocephalic and atraumatic  Right Ear: Hearing normal  No decreased hearing noted  Left Ear: Hearing normal  No decreased hearing noted  Nose: Nose normal  No nasal deformity  Cardiovascular:      Rate and Rhythm: Normal rate and regular rhythm  Heart sounds: Normal heart sounds, S1 normal and S2 normal  No murmur heard  Pulmonary:      Effort: Pulmonary effort is normal  No tachypnea, bradypnea, accessory muscle usage, prolonged expiration, respiratory distress or retractions   He is not intubated  Breath sounds: Normal breath sounds  No stridor, decreased air movement or transmitted upper airway sounds  No decreased breath sounds, wheezing, rhonchi or rales  Abdominal:      General: Abdomen is flat  Bowel sounds are normal  There is no distension  Palpations: Abdomen is soft  Tenderness: There is generalized abdominal tenderness  There is no right CVA tenderness, left CVA tenderness or guarding  Hernia: No hernia is present  Skin:     General: Skin is warm and dry  Capillary Refill: Capillary refill takes less than 2 seconds  Neurological:      General: No focal deficit present  Mental Status: He is alert and oriented to person, place, and time  Psychiatric:         Mood and Affect: Mood is anxious  Behavior: Behavior is cooperative  Lab Results:   I have personally reviewed pertinent lab results  , CBC:   Lab Results   Component Value Date    WBC 29 85 (H) 01/02/2022    HGB 15 3 01/02/2022    HCT 51 4 (H) 01/02/2022    MCV 64 (L) 01/02/2022     (H) 01/02/2022    MCH 19 1 (L) 01/02/2022    MCHC 29 8 (L) 01/02/2022    RDW 19 3 (H) 01/02/2022    MPV 8 4 (L) 01/02/2022    NRBC 0 01/02/2022   , CMP:   Lab Results   Component Value Date    SODIUM 136 01/02/2022    K 4 4 01/02/2022    CL 97 (L) 01/02/2022    CO2 25 01/02/2022    BUN 22 01/02/2022    CREATININE 2 58 (H) 01/02/2022    CALCIUM 10 2 (H) 01/02/2022    AST 37 01/02/2022    ALT 82 (H) 01/02/2022    ALKPHOS 122 (H) 01/02/2022    EGFR 32 01/02/2022     Imaging: I have personally reviewed pertinent reports  EKG, Pathology, and Other Studies: I have personally reviewed pertinent reports  Counseling / Coordination of Care  Total floor / unit time spent today 45 minutes  Greater than 50% of total time was spent with the patient and / or family counseling and / or coordination of care    A description of the counseling / coordination of care:  Extensive review of chart, developing plan, reviewing with attending, coordinating care, physical exam, history taking, reviewing labs, reviewing imaging, patient Education

## 2022-01-02 NOTE — H&P
History and Physical - [facility]    Patient Information: Geneva Esparza 27 y o  male MRN: [id number]  Unit/Bed#: ED CT1 Encounter: [id number]  Admitting Physician: Shirley Brown DO  PCP: Genet Birmingham DO  Date of Admission:  01/02/22        Hospital Problem List:     Principal Problem:    Sepsis [facility])  Active Problems:    Abdominal pain with hx of Ulcerative Colitis    SARAHI (acute kidney injury) ([place] [id number] )    Thrombocytosis    Ankylosing spondylitis ([place] [address] )    Elevated LFTs      Assessment/Plan:    * Sepsis ([facility] )  Assessment & Plan  As noted by tachycardia, leukocytosis, lactic acidosis, possibly due to enteritis, pouchitis  Patient also with diarrhea, possible C diff  Lactic acid 3 5 which has normalized with fluid boluses in the ER  Get repeat lab work in a   Broad-spectrum antibiotic with IV Zosyn to cover for intra-abdominal infection and p o  Vancomycin to cover for possible C diff  Follow-up on pending blood cultures  Will consider ID consult based on clinical course    Abdominal pain with hx of Ulcerative Colitis  Assessment & Plan  Patient presented to the ER with abdominal pain, nausea, vomiting and multiple episodes of diarrhea  Patient with history of ulcerative colitis and is status post total colectomy and J-pouch ileoanal anastomosis  On CT abdomen/pelvis patient was noted to have findings suggestive of infectious versus inflammatory enteritis  No obstruction  Patient seen by GI and surgery and plan of care was coordinated with them  Will keep patient NPO for now  Aggressive IVF  Symptomatic treatment with morphine, Zofran, Phenergan p r n  SARAHI (acute kidney injury) ([place] [address] )  Assessment & Plan  Creatinine 2 58 on admission  Most likely pre renal in nature  Aggressive IVF  Repeat lab work in a       Results from last 7 days   Lab Units 01/02/22  0630   BUN mg/dL 22   CREATININE mg/dL 2 58*       Elevated LFTs  Assessment & Plan  Elevated ALT, alk-phos with normal T bili  Right upper quadrant ultrasound has been ordered  Repeat lab work in a m  Ankylosing spondylitis ([address] )  Assessment & Plan  Patient on Diana Dominguezhant which will be on hold    Thrombocytosis  Assessment & Plan  Platelet count of 690, likely reactive  Repeat lab work in a m  VTE Prophylaxis: Low risk, ambulatory  / sequential compression device   Code Status: Level 1 - Full Code    Anticipated Length of Stay:  Patient will be admitted on an Inpatient basis with an anticipated length of stay of atleast 2 midnights  Justification for Hospital Stay:  Sepsis, abdominal pain, acute kidney injury    Total Time for Visit, including Counseling / Coordination of Care: 65 min  Greater than 50% of this total time spent on direct patient counseling and coordination of care  Chief Complaint:     Abdominal Pain (pt started with abd pain yesterday, history of SBO and colitis)    History of Present Illness:    Cindi Younger is a 27 y o  male who presents with abdominal pain, nausea, vomiting along with diarrhea that started yesterday  Patient with history of ulcerative colitis status post total colectomy and J-pouch ileoanal anastomosis  Patient reports some diarrhea at baseline but the diarrhea has worsened  Reports chills and decreased appetite but no fevers at home  States he had steak last night    Review of Systems:    Review of Systems   Constitutional: Positive for appetite change and chills  Negative for fever  HENT: Negative for congestion and trouble swallowing  Eyes: Negative for photophobia and visual disturbance  Respiratory: Negative for cough, chest tightness and shortness of breath  Cardiovascular: Negative for chest pain and leg swelling  Gastrointestinal: Positive for abdominal pain, diarrhea, nausea and vomiting  Genitourinary: Negative for dysuria, frequency and hematuria  Musculoskeletal: Negative for back pain  Skin: Negative for wound     Neurological: Positive for dizziness and light-headedness  Negative for syncope  Hematological: Does not bruise/bleed easily  Psychiatric/Behavioral: Negative for agitation  Past Medical and Surgical History:     Past Medical History:   Diagnosis Date    Anxiety     Bowel obstruction (HCC)     Clostridium difficile colitis 9/13/2018    Colitis     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 ) 9/13/2018    Pancreatitis     Ulcerative colitis ([facility] )        Past Surgical History:   Procedure Laterality Date    COLECTOMY TOTAL      with ileal pouch and anastemosis    TOTAL COLECTOMY         Meds/Allergies:    PTA meds:   Prior to Admission Medications   Prescriptions Last Dose Informant Patient Reported? Taking? DULoxetine (CYMBALTA) 30 mg delayed release capsule 1/2/2022 at Unknown time  Yes Yes   Sig: daily   Tofacitinib Citrate ER (Xeljanz XR) 11 MG TB24 1/1/2022 at Unknown time  Yes Yes   Sig: Take by mouth daily   adalimumab (HUMIRA) 40 mg/0 8 mL PSKT Not Taking at Unknown time  Yes No   Sig: Inject 40 mg under the skin every 14 (fourteen) days   Patient not taking: Reported on 11/5/2021   benzonatate (TESSALON) 200 MG capsule Not Taking at Unknown time  No No   Sig: Take 1 capsule (200 mg total) by mouth 3 (three) times a day as needed for cough   Patient not taking: Reported on 1/2/2022    cyclobenzaprine (FLEXERIL) 10 mg tablet Not Taking at Unknown time  No No   Sig: Take 1 tablet (10 mg total) by mouth 2 (two) times a day as needed for muscle spasms   Patient not taking: Reported on 11/5/2021   naproxen (EC NAPROSYN) 500 MG EC tablet   Yes No   Sig: Take 500 mg by mouth Three times a day   Patient not taking: Reported on 11/5/2021   psyllium (METAMUCIL) 58 6 % powder   Yes Yes   Sig: Take 1 packet by mouth 3 (three) times a day      Facility-Administered Medications: None       Allergies:    Allergies   Allergen Reactions    Mesalamine GI Intolerance     Other reaction(s): Pancreatitis  Annotation - [id number]: pancreatitis     History:     Marital Status: Single     Substance Use History:   Social History     Substance and Sexual Activity   Alcohol Use Yes    Alcohol/week: 3 0 standard drinks    Types: 3 Standard drinks or equivalent per week    Comment: BARELY PT REPORTS     Social History     Tobacco Use   Smoking Status Never Smoker   Smokeless Tobacco Never Used     Social History     Substance and Sexual Activity   Drug Use No       Family History:    History reviewed  No pertinent family history  Physical Exam:     Vitals:   Blood Pressure: 120/61 (01/02/22 0815)  Pulse: (!) 106 (01/02/22 0815)  Temperature: 97 9 °F (36 6 °C) (01/02/22 0626)  Temp Source: Tympanic (01/02/22 0626)  Respirations: (!) 25 (01/02/22 0815)  Weight - Scale: 82 5 kg (181 lb 14 1 oz) (01/02/22 0626)  SpO2: 95 % (01/02/22 0815)    Physical Exam  Constitutional:       General: He is not in acute distress  Appearance: He is ill-appearing  He is not diaphoretic  HENT:      Head: Normocephalic and atraumatic  Eyes:      General:         Right eye: No discharge  Left eye: No discharge  Cardiovascular:      Rate and Rhythm: Regular rhythm  Tachycardia present  Pulmonary:      Effort: Pulmonary effort is normal  No respiratory distress  Breath sounds: Normal breath sounds  No wheezing or rales  Abdominal:      General: Bowel sounds are normal  There is no distension  Palpations: Abdomen is soft  Tenderness: There is abdominal tenderness (generalized)  There is no guarding  Musculoskeletal:      Right lower leg: No edema  Left lower leg: No edema  Neurological:      Mental Status: He is alert and oriented to person, place, and time  Lab Results: I have personally reviewed pertinent reports        Results from last 7 days   Lab Units 01/02/22  0630   WBC Thousand/uL 29 85*   HEMOGLOBIN g/dL 15 3   HEMATOCRIT % 51 4*   PLATELETS Thousands/uL 546*   NEUTROS PCT % 89*   LYMPHS PCT % 4*   MONOS PCT % 5   EOS PCT % 1     Results from last 7 days   Lab Units 01/02/22  0630   POTASSIUM mmol/L 4 4   CHLORIDE mmol/L 97*   CO2 mmol/L 25   BUN mg/dL 22   CREATININE mg/dL 2 58*   CALCIUM mg/dL 10 2*   ALK PHOS U/L 122*   ALT U/L 82*   AST U/L 37           Imaging: I have personally reviewed pertinent reports  CT abdomen pelvis with contrast    Result Date: 1/2/2022  Narrative: CT ABDOMEN AND PELVIS WITH IV CONTRAST INDICATION:   Abdominal pain, acute, nonlocalized abdominal pain,vomiting  History of small bowel obstruction and colitis  COMPARISON:  Abdomen and pelvic CT from 1/12/2020  Older CTs, dating back to 2/9/2019 were also reviewed  TECHNIQUE:  CT examination of the abdomen and pelvis was performed  Axial, sagittal, and coronal 2D reformatted images were created from the source data and submitted for interpretation  Radiation dose length product (DLP) for this visit:  706 mGy-cm   This examination, like all CT scans performed in the [facility], was performed utilizing techniques to minimize radiation dose exposure, including the use of iterative reconstruction and automated exposure control  IV Contrast:  100 mL of iohexol (OMNIPAQUE) Enteric Contrast:  Enteric contrast was not administered  FINDINGS: ABDOMEN LOWER CHEST:  No clinically significant abnormality identified in the visualized lower chest  LIVER/BILIARY TREE:  Unremarkable  GALLBLADDER:  No calcified gallstones  No pericholecystic inflammatory change  SPLEEN:  Unremarkable  PANCREAS:  Unremarkable  ADRENAL GLANDS:  Unremarkable  KIDNEYS/URETERS:  Unremarkable  No hydronephrosis  STOMACH AND BOWEL:  Patient is status post colectomy and ileal pouch-anal anastomosis  There is no evidence of obstruction, but there is mild bowel wall thickening and mucosal hyperemia in a right lower quadrant small bowel just proximal to the ileal pouch (series 2 images 51-75, and series 601 images 64-83)  This is suspicious for an infectious or inflammatory enteritis  APPENDIX:  No findings to suggest appendicitis  ABDOMINOPELVIC CAVITY:  There is a 1 2 cm aortocaval node (series 2 image 49) and a 1 5 cm right external iliac node  (Series 2 image 68 ) Both have demonstrated long-term stability  VESSELS:  Unremarkable for patient's age  PELVIS REPRODUCTIVE ORGANS:  Unremarkable for patient's age  URINARY BLADDER:  Unremarkable  ABDOMINAL WALL/INGUINAL REGIONS:  Unremarkable  OSSEOUS STRUCTURES:  No acute fracture or destructive osseous lesion  Impression: Patient is status post colectomy and ileal pouch-anal anastomosis  There is no evidence of obstruction, but there is mild bowel wall thickening and mucosal hyperemia in a right lower quadrant small bowel just proximal to the ileal pouch (series 2 images 51-75, and series 601 images 64-83)  This is suspicious for an infectious or inflammatory enteritis  Again seen is aortocaval and right external iliac adenopathy, which have demonstrated long-term stability  Workstation performed: [id number]       CT abdomen pelvis with contrast   Final Result      Patient is status post colectomy and ileal pouch-anal anastomosis  There is no evidence of obstruction, but there is mild bowel wall thickening and mucosal hyperemia in a right lower quadrant small bowel just proximal to the ileal pouch (series 2 images 51-75, and series 601 images 64-83)  This is suspicious for an    infectious or inflammatory enteritis  Again seen is aortocaval and right external iliac adenopathy, which have demonstrated long-term stability  Workstation performed: [id number]             EKG, Pathology, and Other Studies Reviewed on Admission:   · No ekg noted    Allscripts/EPIC Records Reviewed: Yes     ** Please Note: "This note has been constructed using a voice recognition system  Therefore there may be syntax, spelling, and/or grammatical errors   Please call if you have any questions  "**

## 2022-01-03 ENCOUNTER — APPOINTMENT (INPATIENT)
Dept: RADIOLOGY | Facility: HOSPITAL | Age: 27
DRG: 872 | End: 2022-01-03
Payer: COMMERCIAL

## 2022-01-03 PROBLEM — D75.839 THROMBOCYTOSIS: Status: RESOLVED | Noted: 2019-01-05 | Resolved: 2022-01-03

## 2022-01-03 LAB
ALBUMIN SERPL BCP-MCNC: 3.1 G/DL (ref 3.5–5)
ALP SERPL-CCNC: 79 U/L (ref 46–116)
ALT SERPL W P-5'-P-CCNC: 48 U/L (ref 12–78)
ANION GAP SERPL CALCULATED.3IONS-SCNC: 8 MMOL/L (ref 4–13)
AST SERPL W P-5'-P-CCNC: 25 U/L (ref 5–45)
BILIRUB SERPL-MCNC: 0.81 MG/DL (ref 0.2–1)
BUN SERPL-MCNC: 11 MG/DL (ref 5–25)
C DIFF TOX GENS STL QL NAA+PROBE: NEGATIVE
CALCIUM ALBUM COR SERPL-MCNC: 9.3 MG/DL (ref 8.3–10.1)
CALCIUM SERPL-MCNC: 8.6 MG/DL (ref 8.3–10.1)
CAMPYLOBACTER DNA SPEC NAA+PROBE: NORMAL
CHLORIDE SERPL-SCNC: 99 MMOL/L (ref 100–108)
CO2 SERPL-SCNC: 28 MMOL/L (ref 21–32)
CREAT SERPL-MCNC: 1.31 MG/DL (ref 0.6–1.3)
ERYTHROCYTE [DISTWIDTH] IN BLOOD BY AUTOMATED COUNT: 17.2 % (ref 11.6–15.1)
GFR SERPL CREATININE-BSD FRML MDRD: 73 ML/MIN/1.73SQ M
GLUCOSE SERPL-MCNC: 86 MG/DL (ref 65–140)
HCT VFR BLD AUTO: 38 % (ref 36.5–49.3)
HGB BLD-MCNC: 11.5 G/DL (ref 12–17)
MAGNESIUM SERPL-MCNC: 1.8 MG/DL (ref 1.6–2.6)
MCH RBC QN AUTO: 18.9 PG (ref 26.8–34.3)
MCHC RBC AUTO-ENTMCNC: 29.7 G/DL (ref 31.4–37.4)
MCV RBC AUTO: 64 FL (ref 82–98)
PLATELET # BLD AUTO: 345 THOUSANDS/UL (ref 149–390)
PMV BLD AUTO: 8.8 FL (ref 8.9–12.7)
POTASSIUM SERPL-SCNC: 3.6 MMOL/L (ref 3.5–5.3)
PROT SERPL-MCNC: 6.7 G/DL (ref 6.4–8.2)
RBC # BLD AUTO: 5.98 MILLION/UL (ref 3.88–5.62)
SALMONELLA DNA SPEC QL NAA+PROBE: NORMAL
SHIGA TOXIN STX GENE SPEC NAA+PROBE: NORMAL
SHIGELLA DNA SPEC QL NAA+PROBE: NORMAL
SODIUM SERPL-SCNC: 135 MMOL/L (ref 136–145)
WBC # BLD AUTO: 8.85 THOUSAND/UL (ref 4.31–10.16)

## 2022-01-03 PROCEDURE — 85027 COMPLETE CBC AUTOMATED: CPT | Performed by: FAMILY MEDICINE

## 2022-01-03 PROCEDURE — 99232 SBSQ HOSP IP/OBS MODERATE 35: CPT | Performed by: INTERNAL MEDICINE

## 2022-01-03 PROCEDURE — 76705 ECHO EXAM OF ABDOMEN: CPT

## 2022-01-03 PROCEDURE — 99232 SBSQ HOSP IP/OBS MODERATE 35: CPT | Performed by: SPECIALIST

## 2022-01-03 PROCEDURE — 99232 SBSQ HOSP IP/OBS MODERATE 35: CPT | Performed by: FAMILY MEDICINE

## 2022-01-03 PROCEDURE — 80053 COMPREHEN METABOLIC PANEL: CPT | Performed by: FAMILY MEDICINE

## 2022-01-03 PROCEDURE — 83735 ASSAY OF MAGNESIUM: CPT | Performed by: FAMILY MEDICINE

## 2022-01-03 RX ORDER — DICYCLOMINE HYDROCHLORIDE 10 MG/1
10 CAPSULE ORAL ONCE
Status: COMPLETED | OUTPATIENT
Start: 2022-01-03 | End: 2022-01-03

## 2022-01-03 RX ADMIN — Medication 125 MG: at 05:15

## 2022-01-03 RX ADMIN — PIPERACILLIN AND TAZOBACTAM 3.38 G: 36; 4.5 INJECTION, POWDER, FOR SOLUTION INTRAVENOUS at 10:43

## 2022-01-03 RX ADMIN — Medication 125 MG: at 13:16

## 2022-01-03 RX ADMIN — Medication 125 MG: at 18:09

## 2022-01-03 RX ADMIN — DULOXETINE HYDROCHLORIDE 30 MG: 30 CAPSULE, DELAYED RELEASE ORAL at 10:43

## 2022-01-03 RX ADMIN — HYDROMORPHONE HYDROCHLORIDE 0.5 MG: 1 INJECTION, SOLUTION INTRAMUSCULAR; INTRAVENOUS; SUBCUTANEOUS at 00:47

## 2022-01-03 RX ADMIN — Medication 125 MG: at 00:02

## 2022-01-03 RX ADMIN — PIPERACILLIN AND TAZOBACTAM 3.38 G: 36; 4.5 INJECTION, POWDER, FOR SOLUTION INTRAVENOUS at 02:52

## 2022-01-03 RX ADMIN — SODIUM CHLORIDE, SODIUM LACTATE, POTASSIUM CHLORIDE, AND CALCIUM CHLORIDE 200 ML/HR: .6; .31; .03; .02 INJECTION, SOLUTION INTRAVENOUS at 04:01

## 2022-01-03 RX ADMIN — HYDROMORPHONE HYDROCHLORIDE 1 MG: 1 INJECTION, SOLUTION INTRAMUSCULAR; INTRAVENOUS; SUBCUTANEOUS at 04:48

## 2022-01-03 RX ADMIN — PIPERACILLIN AND TAZOBACTAM 3.38 G: 36; 4.5 INJECTION, POWDER, FOR SOLUTION INTRAVENOUS at 16:10

## 2022-01-03 RX ADMIN — DICYCLOMINE HYDROCHLORIDE 10 MG: 10 CAPSULE ORAL at 11:52

## 2022-01-03 RX ADMIN — PIPERACILLIN AND TAZOBACTAM 3.38 G: 36; 4.5 INJECTION, POWDER, FOR SOLUTION INTRAVENOUS at 21:31

## 2022-01-03 NOTE — PROGRESS NOTES
Progress Note - [facility]  Mio Rapp 27 y o  male MRN: [id number]    Unit/Bed#: [address] [place] [address] Encounter: [id number]      Assessment/Plan:  1  Enteritis and sepsis in the setting of immunosuppression, hx of total colectomy and J pouch for Ulcerative Colitis  27year old male with history of ankylosing spondylitis on xeljanz and ulcerative colitis status post proctocolectomy and 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 who presents with acute onset severe burning umbilical pain associated with non bloody diarrhea and vomiting  He met sepsis criteria on admission, creatinine 2 58, elevated lactic acid, and significant leukocytosis with WBC 29, also elevation of ALT, alkaline phosphatase  Normal T  Bili and lipase  CT imaging showed status post colectomy and ileal pouch-anal anastomosis  No evidence of obstruction, but mild bowel wall thickening and mucosal hyperemia in the right lower quadrant small bowel just proximal to the ileal pouch suspicious for an infectious or inflammatory enteritis  Differentials include biliary source, forming abscess, infectious enteritis  No current evidence of perforation  COVID-19 negative  C reactive protein 16 7 and sed rate 23  Elevated LFTs on admission have resolved  Leukocytosis has resolved, WBC 8 85  Patient reported 2 loose bowel movements today  Denies any blood in stool or blood from rectal area  No further nausea or vomiting  Denies abdominal pain  Temp max 101 1 on 01/02/2022 at 2:55 p m  [place] Organ     -Surgery following    -Stool for  C diff PCR, enteric stool panel, Ova  and parasite pending  - RUQ US pending  -Blood cultures pending   -Continue supportive care for sepsis per primary team  -Continue pain management per primary team  -Zofran as needed for nausea or vomiting  -Clear liquid diet and advanced as tolerated  -Continue Zosyn and oral vancomycin   -Follow up surgeon as outpatient for colonoscopy       Subjective:   Lying in bed in no acute distress  Ultrasound of abdomen completed this a m  Torsten Side Denies any further nausea, vomiting, or abdominal pain  Denies acid reflux or heartburn  Patient reports he had 2 loose bowel movements today  Patient denies any blood in stool, blood from rectal area, or black tarry stool  Patient reports he feels better today  Objective:     Vitals: Blood pressure 113/67, pulse 79, temperature 99 2 °F (37 3 °C), temperature source Oral, resp  rate 19, height 5' 9" (1 753 m), weight 83 5 kg (184 lb 1 4 oz), SpO2 96 %  ,Body mass index is 27 18 kg/m²  No intake or output data in the 24 hours ending 01/03/22 1201    Physical Exam: Physical Exam  Vitals and nursing note reviewed  Constitutional:       General: He is not in acute distress  Appearance: He is not ill-appearing  Cardiovascular:      Rate and Rhythm: Normal rate and regular rhythm  Pulses: Normal pulses  Heart sounds: Normal heart sounds  Pulmonary:      Effort: Pulmonary effort is normal       Breath sounds: Normal breath sounds  Abdominal:      General: Bowel sounds are normal  There is no distension  Palpations: Abdomen is soft  There is no mass  Tenderness: There is no abdominal tenderness  There is no guarding or rebound  Hernia: No hernia is present  Comments: Previous surgical scars noted on abdomen  Musculoskeletal:      Right lower leg: No edema  Left lower leg: No edema  Skin:     Capillary Refill: Capillary refill takes less than 2 seconds  Coloration: Skin is not jaundiced or pale  Neurological:      Mental Status: He is alert and oriented to person, place, and time     Psychiatric:         Mood and Affect: Mood normal          Invasive Devices  Report    Peripheral Intravenous Line            Peripheral IV 01/02/22 Left Antecubital 1 day    Peripheral IV 01/02/22 Right Antecubital 1 day                Current Facility-Administered Medications:     DULoxetine (CYMBALTA) delayed release capsule 30 mg, 30 mg, Oral, Daily, Darlin Revankar, DO, 30 mg at 01/03/22 1043    HYDROmorphone (DILAUDID) injection 0 5 mg, 0 5 mg, Intravenous, Q4H PRN, Darlin Revankar, DO, 0 5 mg at 01/03/22 0047    HYDROmorphone (DILAUDID) injection 1 mg, 1 mg, Intravenous, Q4H PRN, Darlin Revankar, DO, 1 mg at 01/03/22 0448    lactated ringers infusion, 100 mL/hr, Intravenous, Continuous, Darlin Revankar, DO, Last Rate: 100 mL/hr at 01/03/22 1047, 100 mL/hr at 01/03/22 1047    ondansetron (ZOFRAN) injection 4 mg, 4 mg, Intravenous, Q6H PRN, Darlin Revankar, DO    piperacillin-tazobactam (ZOSYN) IVPB 3 375 g, 3 375 g, Intravenous, Q6H, Darlin Revankar, DO, Last Rate: 0 mL/hr at 01/02/22 1419, 3 375 g at 01/03/22 1043    promethazine (PHENERGAN) injection 25 mg, 25 mg, Intravenous, Q6H PRN, Darlin Revankar, DO    vancomycin (VANCOCIN) oral solution 125 mg, 125 mg, Oral, Q6H KAITLIN, Darlin Revankar, DO, 125 mg at 01/03/22 0515    Lab Results:   Recent Results (from the past 24 hour(s))   Sedimentation rate, automated    Collection Time: 01/02/22  1:30 PM   Result Value Ref Range    Sed Rate 23 (H) 0 - 14 mm/hour   Comprehensive metabolic panel    Collection Time: 01/03/22  5:13 AM   Result Value Ref Range    Sodium 135 (L) 136 - 145 mmol/L    Potassium 3 6 3 5 - 5 3 mmol/L    Chloride 99 (L) 100 - 108 mmol/L    CO2 28 21 - 32 mmol/L    ANION GAP 8 4 - 13 mmol/L    BUN 11 5 - 25 mg/dL    Creatinine 1 31 (H) 0 60 - 1 30 mg/dL    Glucose 86 65 - 140 mg/dL    Calcium 8 6 8 3 - 10 1 mg/dL    Corrected Calcium 9 3 8 3 - 10 1 mg/dL    AST 25 5 - 45 U/L    ALT 48 12 - 78 U/L    Alkaline Phosphatase 79 46 - 116 U/L    Total Protein 6 7 6 4 - 8 2 g/dL    Albumin 3 1 (L) 3 5 - 5 0 g/dL    Total Bilirubin 0 81 0 20 - 1 00 mg/dL    eGFR 73 ml/min/1 73sq m   Magnesium    Collection Time: 01/03/22  5:13 AM   Result Value Ref Range    Magnesium 1 8 1 6 - 2 6 mg/dL   CBC (With Platelets)    Collection Time: 01/03/22  5:13 AM   Result Value Ref Range    WBC 8 85 4 31 - 10 16 Thousand/uL    RBC 5 98 (H) 3 88 - 5 62 Million/uL    Hemoglobin 11 5 (L) 12 0 - 17 0 g/dL    Hematocrit 38 0 36 5 - 49 3 %    MCV 64 (L) 82 - 98 fL    MCH 18 9 (L) 26 8 - 34 3 pg    MCHC 29 7 (L) 31 4 - 37 4 g/dL    RDW 17 2 (H) 11 6 - 15 1 %    Platelets 617 245 - 432 Thousands/uL    MPV 8 8 (L) 8 9 - 12 7 fL             Imaging Studies: US right upper quadrant    Result Date: 1/3/2022  Narrative: RIGHT UPPER QUADRANT ULTRASOUND INDICATION:     elevated LFTs, sepsis  COMPARISON:  CT 1/2/2022 TECHNIQUE:   Real-time ultrasound of the right upper quadrant was performed with a curvilinear transducer with both volumetric sweeps and still imaging techniques  FINDINGS: PANCREAS:  Visualized portions of the pancreas are within normal limits  AORTA AND IVC:  Visualized portions are normal for patient age  LIVER: Size:  Within normal range  The liver measures 17 5 cm in the midclavicular line  Contour:  Surface contour is smooth  Parenchyma:  Echogenicity and echotexture are within normal limits  No evidence of suspicious mass  Limited imaging of the main portal vein shows it to be patent and hepatopetal   BILIARY: No gallbladder findings  No intrahepatic biliary dilatation  CBD measures 3 mm  No choledocholithiasis  KIDNEY: Right kidney measures 11 4 x 4 9 x 5 2 cm  Within normal limits  ASCITES:   None  Impression: Normal  Workstation performed: [id number]     CT abdomen pelvis with contrast    Result Date: 1/2/2022  Narrative: CT ABDOMEN AND PELVIS WITH IV CONTRAST INDICATION:   Abdominal pain, acute, nonlocalized abdominal pain,vomiting  History of small bowel obstruction and colitis  COMPARISON:  Abdomen and pelvic CT from 1/12/2020  Older CTs, dating back to 2/9/2019 were also reviewed  TECHNIQUE:  CT examination of the abdomen and pelvis was performed  Axial, sagittal, and coronal 2D reformatted images were created from the source data and submitted for interpretation  Radiation dose length product (DLP) for this visit:  706 mGy-cm   This examination, like all CT scans performed in the [facility], was performed utilizing techniques to minimize radiation dose exposure, including the use of iterative reconstruction and automated exposure control  IV Contrast:  100 mL of iohexol (OMNIPAQUE) Enteric Contrast:  Enteric contrast was not administered  FINDINGS: ABDOMEN LOWER CHEST:  No clinically significant abnormality identified in the visualized lower chest  LIVER/BILIARY TREE:  Unremarkable  GALLBLADDER:  No calcified gallstones  No pericholecystic inflammatory change  SPLEEN:  Unremarkable  PANCREAS:  Unremarkable  ADRENAL GLANDS:  Unremarkable  KIDNEYS/URETERS:  Unremarkable  No hydronephrosis  STOMACH AND BOWEL:  Patient is status post colectomy and ileal pouch-anal anastomosis  There is no evidence of obstruction, but there is mild bowel wall thickening and mucosal hyperemia in a right lower quadrant small bowel just proximal to the ileal pouch (series 2 images 51-75, and series 601 images 64-83)  This is suspicious for an infectious or inflammatory enteritis  APPENDIX:  No findings to suggest appendicitis  ABDOMINOPELVIC CAVITY:  There is a 1 2 cm aortocaval node (series 2 image 49) and a 1 5 cm right external iliac node  (Series 2 image 68 ) Both have demonstrated long-term stability  VESSELS:  Unremarkable for patient's age  PELVIS REPRODUCTIVE ORGANS:  Unremarkable for patient's age  URINARY BLADDER:  Unremarkable  ABDOMINAL WALL/INGUINAL REGIONS:  Unremarkable  OSSEOUS STRUCTURES:  No acute fracture or destructive osseous lesion  Impression: Patient is status post colectomy and ileal pouch-anal anastomosis  There is no evidence of obstruction, but there is mild bowel wall thickening and mucosal hyperemia in a right lower quadrant small bowel just proximal to the ileal pouch (series 2 images 51-75, and series 601 images 64-83)    This is suspicious for an infectious or inflammatory enteritis  Again seen is aortocaval and right external iliac adenopathy, which have demonstrated long-term stability  Workstation performed: [id number]           [place]      Please Note: "This note has been constructed using a voice recognition system  Therefore there may be syntax, spelling, and/or grammatical errors   Please call if you have any questions  "**

## 2022-01-03 NOTE — PROGRESS NOTES
Progress Note - General Surgery   [facility] 27 y o  male MRN: [id number]  Unit/Bed#: [address] [place] [address] Encounter: [id number]    Assessment:  76-year-old male with history of ulcerative colitis status post total colectomy with J-pouch and diverting loop ileostomy in 2015 presenting with abdominal pain, nausea, vomiting, diarrhea  Patient does have some distal ileitis and likely pouchitis on CT scan but some to be expected if patient is having infectious enteritis  No signs of SBO on CT  Patient is on Veronda Rash for ankylosing spondylitis  Mild elevation in LFTs on admission has completely resolved      1) Sepsis - POA, improving, Tmax 101 1 yesterday afternoon, was tachycardic and tachypneic with elevated lactic acid and leukocytosis of 29,000 on admission  2) Ulcerative Colitis - patient is s/p total colectomy,  3) History of total colectomy with J pouch - patient had diagnosis of ulcerative colitis in 2012, secondary to the fact that patient had pan colitis in 2015 he did have total colectomy with J-pouch formation and diverting loop ileostomy, patient was just reversed approximately a year ago, patient does get some anal stricture that is treated with prouchoscopy and dilation which helps significantly approx every 6 months, follows with a physician at [facility]  4) Abdominal Pain - patient was having burning diffusely across the abdomen on admission, this has almost completely resolved at this point  5) aortocaval and external iliac adenopathy   6) SARAHI -- creatinine improving 2 5 > 1 3, suspect pre-renal secondary to dehydration from vomiting and diarrhea      Plan:  · Follow up RUQ ultrasound  · Consider cipro/flagyl versus zosyn  · Continue PO vancomycin  · IV fluid resuscitation  · GI input appreciated  · C diff and stool enteric panels pending   · Can advance to liquid diet today, then advance as tolerated  · Discharge per primary team      Subjective/Objective     Subjective:   Patient reports his last episode of vomiting was yesterday  Denies any hematemesis  His abdominal pain has improved  His episodes of diarrhea are less frequent  Patient reports he's had episodes of small-bowel obstruction in the past however has never had enteritis before  He would like to go home today if possible  Objective:     Blood pressure 112/67, pulse 82, temperature 98 8 °F (37 1 °C), temperature source Oral, resp  rate 18, height 5' 9" (1 753 m), weight 83 5 kg (184 lb 1 4 oz), SpO2 95 %  ,Body mass index is 27 18 kg/m²  No intake or output data in the 24 hours ending 01/03/22 0959    Invasive Devices  Report    Peripheral Intravenous Line            Peripheral IV 01/02/22 Left Antecubital 1 day    Peripheral IV 01/02/22 Right Antecubital 1 day                Physical Exam: /67   Pulse 82   Temp 98 8 °F (37 1 °C) (Oral)   Resp 18   Ht 5' 9" (1 753 m)   Wt 83 5 kg (184 lb 1 4 oz)   SpO2 95%   BMI 27 18 kg/m²   General appearance: alert and oriented, in no acute distress  Head: Normocephalic, without obvious abnormality, atraumatic  Abdomen: soft, non tender, non distended, surgical scar from previous ostomy noted, bowel sounds present x4  Extremities: extremities normal, warm and well-perfused; no cyanosis, clubbing, or edema    Lab, Imaging and other studies:  I have personally reviewed pertinent lab results    , CBC:   Lab Results   Component Value Date    WBC 8 85 01/03/2022    HGB 11 5 (L) 01/03/2022    HCT 38 0 01/03/2022    MCV 64 (L) 01/03/2022     01/03/2022    MCH 18 9 (L) 01/03/2022    MCHC 29 7 (L) 01/03/2022    RDW 17 2 (H) 01/03/2022    MPV 8 8 (L) 01/03/2022   , CMP:   Lab Results   Component Value Date    SODIUM 135 (L) 01/03/2022    K 3 6 01/03/2022    CL 99 (L) 01/03/2022    CO2 28 01/03/2022    BUN 11 01/03/2022    CREATININE 1 31 (H) 01/03/2022    CALCIUM 8 6 01/03/2022    AST 25 01/03/2022    ALT 48 01/03/2022    ALKPHOS 79 01/03/2022    EGFR 73 01/03/2022   , Coagulation: No results found for: PT, INR, APTT, Urinalysis: No results found for: Ferd Hurry, SPECGRAV, PHUR, LEUKOCYTESUR, NITRITE, PROTEINUA, GLUCOSEU, KETONESU, BILIRUBINUR, BLOODU, Lipase: No results found for: LIPASE  VTE Pharmacologic Prophylaxis: Reason for no pharmacologic prophylaxis low risk, per primary  VTE Mechanical Prophylaxis: sequential compression device

## 2022-01-03 NOTE — ASSESSMENT & PLAN NOTE
· Sepsis secondary to enteritis/pouchitis  · Was on broad-spectrum antibiotics as listed below  Stool studies negative      Results from last 7 days   Lab Units 01/02/22  0959 01/02/22  0711   LACTIC ACID mmol/L 1 3 3 5*

## 2022-01-03 NOTE — ASSESSMENT & PLAN NOTE
· Likely reactive    Resolved    Results from last 7 days   Lab Units 01/04/22  0537 01/03/22  0513 01/02/22  0630   PLATELETS Thousands/uL [phone]*

## 2022-01-03 NOTE — ASSESSMENT & PLAN NOTE
· History of ulcer colitis status post total colectomy and J-pouch ileoanal anastomosis  · Follows with colorectal at [facility]  · During hospitalization treated for enteritis and was seen by surgery  · Currently on zosyn/vancomycin  Stool studies negative  · Will discharge with ciprofloxacin/metronidazole for five more days to complete seven day course of antibiotics empirically

## 2022-01-03 NOTE — PROGRESS NOTES
[facility] Progress Note  Patient: Euna Goodpasture 27 y o  male   MRN: [id number]  PCP: Sony Jewell DO  Unit/Bed#: [address] [place] [address] Encounter: [id number]  Date Of Visit: 01/03/22    Problem List:    Principal Problem:    Sepsis ([facility] )  Active Problems:    Abdominal pain with hx of Ulcerative Colitis    SARAHI (acute kidney injury) ([facility] )    Ankylosing spondylitis ([place] [address] )    Elevated LFTs      Assessment & Plan:    * Sepsis ([place] [address] )  Assessment & Plan  As noted by tachycardia, leukocytosis, lactic acidosis, possibly due to enteritis, pouchitis  Patient also with diarrhea, possible C diff  Lactic acid 3 5 which has normalized with fluid boluses in the ER  WBC has normalized  Get repeat lab work in a   Broad-spectrum antibiotic with IV Zosyn to cover for intra-abdominal infection and p o  Vancomycin to cover for possible C diff  If studies come back negative, can possibly discontinue antibiotics  Follow-up on pending blood cultures    Abdominal pain with hx of Ulcerative Colitis  Assessment & Plan  Patient presented to the ER with abdominal pain, nausea, vomiting and multiple episodes of diarrhea  Patient with history of ulcerative colitis and is status post total colectomy and J-pouch ileoanal anastomosis  On CT abdomen/pelvis patient was noted to have findings suggestive of infectious versus inflammatory enteritis  No obstruction  Patient seen by GI and surgery and plan of care was coordinated with them  No further abdominal pain, nausea, vomiting  Started on clears and advanced as tolerated   Decreased rate of IVF  Symptomatic treatment with morphine, Zofran, Phenergan p r n   C diff, bacterial enteric panel, O and P pending  Sed rate 23    SARAHI (acute kidney injury) ([place] [address] )  Assessment & Plan  Creatinine 2 58 on admission  Most likely pre renal in nature  Creatinine trending down with IVF  Repeat lab work in a m      Results from last 7 days   Lab Units 01/03/22  0513 01/02/22  0630   BUN mg/dL 11 22   CREATININE mg/dL 1 31* 2 58*       Elevated LFTs  Assessment & Plan  Elevated ALT, alk-phos with normal T bili initially which has normalized today  Right upper quadrant ultrasound has been ordered  Repeat lab work in a m  Ankylosing spondylitis [facility])  Assessment & Plan  Patient on Saeid mawr which will be on hold  Thrombocytosis-resolved as of 1/3/2022  Assessment & Plan  Platelet count of 607, likely reactive --> normalized today  Repeat lab work in a m  VTE Pharmacologic Prophylaxis:   Low Risk (Score 0-2) - Encourage Ambulation  Patient Centered Rounds: I performed bedside rounds with nursing staff today  Discussions with Specialists or Other Care Team Provider: yes - GI    Education and Discussions with Family / Patient: yes    Time Spent for Care: 35 min  More than 50% of total time spent on counseling and coordination of care as described above  Current Length of Stay: 1 day(s)  Current Patient Status: Inpatient   Certification Statement: The patient will continue to require additional inpatient hospital stay due to Sepsis, abdominal pain, acute kidney injury  Discharge Plan: Anticipate discharge tomorrow to home  Code Status: Level 1 - Full Code    Subjective:     No further abdominal pain, nausea, vomiting  Diarrhea has improved and is at his baseline    Objective:     Vitals:   Temp (24hrs), Av 7 °F (37 6 °C), Min:98 8 °F (37 1 °C), Max:101 1 °F (38 4 °C)    Temp:  [98 8 °F (37 1 °C)-101 1 °F (38 4 °C)] 98 8 °F (37 1 °C)  HR:  [] 82  Resp:  [16-20] 18  BP: (111-135)/(56-80) 112/67  SpO2:  [93 %-99 %] 95 %  Body mass index is 27 18 kg/m²  Input and Output Summary (last 24 hours): Intake/Output Summary (Last 24 hours) at 1/3/2022 0903  Last data filed at 2022 6350  Gross per 24 hour   Intake 1000 ml   Output --   Net 1000 ml       Physical Exam:   Physical Exam  Constitutional:       General: He is not in acute distress  Appearance: He is not diaphoretic  HENT:      Head: Normocephalic and atraumatic  Eyes:      General:         Right eye: No discharge  Left eye: No discharge  Cardiovascular:      Rate and Rhythm: Normal rate and regular rhythm  Pulmonary:      Effort: Pulmonary effort is normal  No respiratory distress  Breath sounds: Normal breath sounds  No wheezing or rales  Abdominal:      General: Bowel sounds are normal  There is no distension  Palpations: Abdomen is soft  Tenderness: There is no abdominal tenderness  Neurological:      Mental Status: He is alert and oriented to person, place, and time  Additional Data:     Labs:  Results from last 7 days   Lab Units 01/03/22  0513 01/02/22  0630 01/02/22  0630   WBC Thousand/uL 8 85   < > 29 85*   HEMOGLOBIN g/dL 11 5*   < > 15 3   HEMATOCRIT % 38 0   < > 51 4*   PLATELETS Thousands/uL 345   < > 546*   NEUTROS PCT %  --   --  89*   LYMPHS PCT %  --   --  4*   MONOS PCT %  --   --  5   EOS PCT %  --   --  1    < > = values in this interval not displayed  Results from last 7 days   Lab Units 01/03/22  0513   SODIUM mmol/L 135*   POTASSIUM mmol/L 3 6   CHLORIDE mmol/L 99*   CO2 mmol/L 28   BUN mg/dL 11   CREATININE mg/dL 1 31*   ANION GAP mmol/L 8   CALCIUM mg/dL 8 6   ALBUMIN g/dL 3 1*   TOTAL BILIRUBIN mg/dL 0 81   ALK PHOS U/L 79   ALT U/L 48   AST U/L 25   GLUCOSE RANDOM mg/dL 86                 Results from last 7 days   Lab Units 01/02/22  0959 01/02/22  0711   LACTIC ACID mmol/L 1 3 3 5*       Lines/Drains:  Invasive Devices  Report    Peripheral Intravenous Line            Peripheral IV 01/02/22 Left Antecubital 1 day    Peripheral IV 01/02/22 Right Antecubital 1 day                      Imaging: Reviewed radiology reports from this admission including: abdominal/pelvic CT    Recent Cultures (last 7 days):   Results from last 7 days   Lab Units 01/02/22  0711   BLOOD CULTURE  Received in Microbiology Lab  Culture in Progress  Received in Microbiology Lab  Culture in Progress  Last 24 Hours Medication List:   Current Facility-Administered Medications   Medication Dose Route Frequency Provider Last Rate    DULoxetine  30 mg Oral Daily Darlin Revankar, DO      HYDROmorphone  0 5 mg Intravenous Q4H PRN Darlin Revankar, DO      HYDROmorphone  1 mg Intravenous Q4H PRN Darlin Revankar, DO      lactated ringers  200 mL/hr Intravenous Continuous Darlin Revankar,  mL/hr (01/03/22 0401)    ondansetron  4 mg Intravenous Q6H PRN Darlin Revankar, DO      piperacillin-tazobactam  3 375 g Intravenous Q6H Darlin Revankar, DO 0 g (01/02/22 1419)    promethazine  25 mg Intravenous Q6H PRN Darlin Revankar, DO      vancomycin  125 mg Oral Q6H [facility] Darlin Revankar, DO          Today, Patient Was Seen By: Lety Ronquillo DO    ** Please Note: "This note has been constructed using a voice recognition system  Therefore there may be syntax, spelling, and/or grammatical errors   Please call if you have any questions  "**

## 2022-01-03 NOTE — ASSESSMENT & PLAN NOTE
· A KI secondary to prerenal azotemia/poor intake from abdominal pain    Resolved    Results from last 7 days   Lab Units 01/04/22  0537 01/03/22  0513 01/02/22  0630   BUN mg/dL 7 11 22   CREATININE mg/dL 1 15 1 31* 2 58*   EGFR ml/min/1 73sq m 86 73 32

## 2022-01-03 NOTE — ASSESSMENT & PLAN NOTE
· Mild transaminitis may be viral in nature  Resolved  · Abdominal ultrasound without acute pathology      Results from last 7 days   Lab Units 01/03/22  0513 01/02/22  0630   AST U/L 25 37   ALT U/L 48 82*   TOTAL BILIRUBIN mg/dL 0 81 0 87

## 2022-01-03 NOTE — PLAN OF CARE
Problem: GASTROINTESTINAL - ADULT  Goal: Minimal or absence of nausea and/or vomiting  Description: INTERVENTIONS:  - Administer IV fluids if ordered to ensure adequate hydration  - Maintain NPO status until nausea and vomiting are resolved  - Nasogastric tube if ordered  - Administer ordered antiemetic medications as needed  - Provide nonpharmacologic comfort measures as appropriate  - Advance diet as tolerated, if ordered  - Consider nutrition services referral to assist patient with adequate nutrition and appropriate food choices  Outcome: Progressing  Goal: Maintains or returns to baseline bowel function  Description: INTERVENTIONS:  - Assess bowel function  - Encourage oral fluids to ensure adequate hydration  - Administer IV fluids if ordered to ensure adequate hydration  - Administer ordered medications as needed  - Encourage mobilization and activity  - Consider nutritional services referral to assist patient with adequate nutrition and appropriate food choices  Outcome: Progressing  Goal: Maintains adequate nutritional intake  Description: INTERVENTIONS:  - Monitor percentage of each meal consumed  - Identify factors contributing to decreased intake, treat as appropriate  - Assist with meals as needed  - Monitor I&O, weight, and lab values if indicated  - Obtain nutrition services referral as needed  Outcome: Progressing  Goal: Establish and maintain optimal ostomy function  Description: INTERVENTIONS:  - Assess bowel function  - Encourage oral fluids to ensure adequate hydration  - Administer IV fluids if ordered to ensure adequate hydration   - Administer ordered medications as needed  - Encourage mobilization and activity  - Nutrition services referral to assist patient with appropriate food choices  - Assess stoma site  - Consider wound care consult   Outcome: Progressing  Goal: Oral mucous membranes remain intact  Description: INTERVENTIONS  - Assess oral mucosa and hygiene practices  - Implement preventative oral hygiene regimen  - Implement oral medicated treatments as ordered  - Initiate Nutrition services referral as needed  Outcome: Progressing     Problem: PAIN - ADULT  Goal: Verbalizes/displays adequate comfort level or baseline comfort level  Description: Interventions:  - Encourage patient to monitor pain and request assistance  - Assess pain using appropriate pain scale  - Administer analgesics based on type and severity of pain and evaluate response  - Implement non-pharmacological measures as appropriate and evaluate response  - Consider cultural and social influences on pain and pain management  - Notify physician/advanced practitioner if interventions unsuccessful or patient reports new pain  Outcome: Progressing     Problem: INFECTION - ADULT  Goal: Absence or prevention of progression during hospitalization  Description: INTERVENTIONS:  - Assess and monitor for signs and symptoms of infection  - Monitor lab/diagnostic results  - Monitor all insertion sites, i e  indwelling lines, tubes, and drains  - Monitor endotracheal if appropriate and nasal secretions for changes in amount and color  - [place] appropriate cooling/warming therapies per order  - Administer medications as ordered  - Instruct and encourage patient and family to use good hand hygiene technique  - Identify and instruct in appropriate isolation precautions for identified infection/condition  Outcome: Progressing  Goal: Absence of fever/infection during neutropenic period  Description: INTERVENTIONS:  - Monitor WBC    Outcome: Progressing

## 2022-01-03 NOTE — UTILIZATION REVIEW
Initial Clinical Review    Admission: Date/Time/Statement:   Admission Orders (From admission, onward)     Ordered        01/02/22 0732  INPATIENT ADMISSION  Once                      Orders Placed This Encounter   Procedures    INPATIENT ADMISSION     Standing Status:   Standing     Number of Occurrences:   1     Order Specific Question:   Level of Care     Answer:   Med Surg [16]     Order Specific Question:   Estimated length of stay     Answer:   More than 2 Midnights     Order Specific Question:   Certification     Answer:   I certify that inpatient services are medically necessary for this patient for a duration of greater than two midnights  See H&P and MD Progress Notes for additional information about the patient's course of treatment  ED Arrival Information     Expected Arrival Acuity    1/2/2022 06:25 1/2/2022 06:25 Emergent         Means of arrival Escorted by Service Admission type    [facility] Emergency         Arrival complaint            Chief Complaint   Patient presents with    Abdominal Pain     pt started with abd pain yesterday, history of SBO and colitis       Initial Presentation:   27 y o  male history of ulcerative colitis status post total colectomy and J-pouch ileoanal anastomosis, c diff hx, pancreatitis, who presents with abdominal pain, nausea, vomiting along with diarrhea that started yesterday  Patient reports some diarrhea at baseline but the diarrhea has worsened  Reports chills and decreased appetite but no fevers at home  States he had steak last night  Pt with generalized abdominal tenderness  Lactic acid 3 5, cl 97, anion gap 14, creatinine 2 58, ca 10 2, alt 82, alk phos 122, t protein 9 6 WBC  29 85 CT abd pelvis showing mild bowel wall thickening and mucosal hyperemia in a right lower quadrant small bowel just proximal to the ileal pouch suspicious for an infectious or inflammatory enteritis     Patient Admitted Inpatient Sepsis tachy, fever, lactic acidosis possibly due to enteritis  Broad-spectrum antibiotic with IV Zosyn to cover for intra-abdominal infection and p o  Vancomycin to cover for possible C diff  Follow-up on pending blood cultures   Abdominal pain Ulcerative Colitis   Patient seen by GI and surgery and plan of care was coordinated with them  Will keep patient NPO for now  Aggressive IVF  Symptomatic treatment with morphine, Zofran, Phenergan p r n  SARAHI  Creatinine 2 58 likely pre renal in nature  Aggressive IVF repeat labs  Elevated LFTs with normal t bili  ruq US ordered repeat labs  Ankylosing spondylitis on Yecenia Ivanoff will be on hold  Thrombocytosis plt 546 likely reactive repeat labs  GI CONSULT  Patient acutely septic though the underlying etiology is not clear  Patient immunocompromised given underlying IBD and use of tofacitinib, at risk of opportunistic infections including fungal infections  DDX would include bowel source such as enteritis or pouchitis, a biliary source including the possibility of primary sclerosing cholangitis  No clear suggestion abscess or perforation on CT  Recurrent Clostridium difficile unlikely given prior colectomy, but has been reported  Agree with broad-spectrum antibiotic coverage including oral vancomycin  Right upper quadrant ultrasound  Consider MRI/MRCP if etiology remains obscure  SURGICAL CONSUL  Patient has h/o ulcerative colitis and ankylosing spondylitis  He is s/p total procto-colectomy and J-pouch  He is on Sofía How for ankylosing spondylitis  He presents with 1 day h/o burning diffuse abdominal pain, vomiting and increased stool frequency  His abdomen is soft and CT scan shows no acute intra-abdominal pathology  He has features of sepsis with elevated WBC count of 29,000 and elevated creatinine  This is probably secondary to his immuno-suppressed state  no acute surgical intervention indicated as of right now  continue IV Zosyn,  p o   Vancomycin  recommend testing for C diff , serial abd exam, aggressive IVF repeat labs NPO into tomorrow  Date: 1/3/22   Day 2:   Attending  Ulcerative Colitis improved abd pain , nausea diarrhea  Started on clears and advanced as tolerated , continue IV nad po abx   Decreased rate of IVF  Symptomatic treatment with morphine, Zofran, Phenergan p r n   C diff, bacterial enteric panel, O and P pending  Sed rate 23  SARAHI creatinine down trending continue IVF repeat labs  RUQ us normal  SURGICAL   Sepsis History of total colectomy with J pouch - patient had diagnosis of ulcerative colitis in 2012, secondary to the fact that patient had pan colitis in 2015 he did have total colectomy with J-pouch formation and diverting loop ileostomy, patient was just reversed approximately a year ago, patient does get some anal stricture that is treated with prouchoscopy and dilation which helps significantly approx every 6 months, [facility] a [facility]  F/u RUQ us consider IV Cipro/flagyl vs zosyn  Continue po vancomycin , IV F resuscitation    c diff stool studies  pending   Advance liquid diet today monitor    ED Triage Vitals [01/02/22 0626]   Temperature Pulse Respirations Blood Pressure SpO2   97 9 °F (36 6 °C) 100 20 105/73 99 %      Temp Source Heart Rate Source Patient Position - Orthostatic VS BP Location FiO2 (%)   Tympanic Monitor Lying Left arm --      Pain Score       10 - Worst Possible Pain          Wt Readings from Last 1 Encounters:   01/02/22 83 5 kg (184 lb 1 4 oz)     Additional Vital Signs:   /03/22 1033 99 2 °F (37 3 °C) 79 19 113/67 82 96 % None (Room air) Lying   01/03/22 10:32:16 99 2 °F (37 3 °C) -- 19 113/67 82 -- -- --   01/03/22 02:54:38 98 8 °F (37 1 °C) 82 18 112/67 82 95 % None (Room air) --   01/02/22 22:30:26 98 9 °F (37 2 °C) 76 18 112/66 81 96 % -- --   01/02/22 21:16:22 99 6 °F (37 6 °C) -- 18 133/80 98 -- -- --   01/02/22 2100 -- -- -- -- -- -- None (Room air) --   01/02/22 1536 99 5 °F (37 5 °C) 95 -- 116/64 -- 95 % -- --   01/02/22 1455 101 1 °F (38 4 °C) Abnormal  98 20 117/59 -- 96 % -- --   01/02/22 1345 -- 101 20 -- -- 95 % -- --   01/02/22 1332 101 °F (38 3 °C) Abnormal  107 Abnormal  16 135/63 -- 93 %       Pertinent Labs/Diagnostic Test Results:   1/3/22 US RUQ Normal    1/2 CT abd pelvis Patient is status post colectomy and ileal pouch-anal anastomosis  There is no evidence of obstruction, but there is mild bowel wall thickening and mucosal hyperemia in a right lower quadrant small bowel just proximal to the ileal pouch  This is suspicious for an infectious or inflammatory enteritis  Again seen is aortocaval and right external iliac adenopathy, which have demonstrated long-term stability     Results from last 7 days   Lab Units 01/02/22  0828   SARS-COV-2  Negative     Results from last 7 days   Lab Units 01/03/22  0513 01/02/22  0630   WBC Thousand/uL 8 85 29 85*   HEMOGLOBIN g/dL 11 5* 15 3   HEMATOCRIT % 38 0 51 4*   PLATELETS Thousands/uL 345 546*   NEUTROS ABS Thousands/µL  --  27 14*     Results from last 7 days   Lab Units 01/03/22  0513 01/02/22  0630   SODIUM mmol/L 135* 136   POTASSIUM mmol/L 3 6 4 4   CHLORIDE mmol/L 99* 97*   CO2 mmol/L 28 25   ANION GAP mmol/L 8 14*   BUN mg/dL 11 22   CREATININE mg/dL 1 31* 2 58*   EGFR ml/min/1 73sq m 73 32   CALCIUM mg/dL 8 6 10 2*   MAGNESIUM mg/dL 1 8 2 0     Results from last 7 days   Lab Units 01/03/22  0513 01/02/22  0630   AST U/L 25 37   ALT U/L 48 82*   ALK PHOS U/L 79 122*   TOTAL PROTEIN g/dL 6 7 9 6*   ALBUMIN g/dL 3 1* 4 6   TOTAL BILIRUBIN mg/dL 0 81 0 87     Results from last 7 days   Lab Units 01/03/22  0513 01/02/22  0630   GLUCOSE RANDOM mg/dL 86 134     Results from last 7 days   Lab Units 01/02/22  0630   CK TOTAL U/L 172   CK MB INDEX % <1 0   CK MB ng/mL 1 5     Results from last 7 days   Lab Units 01/02/22  0959 01/02/22  0711   LACTIC ACID mmol/L 1 3 3 5*     Results from last 7 days   Lab Units 01/02/22  0630   LIPASE u/L 84     Results from last 7 days   Lab Units 01/02/22  1330 01/02/22  0630   CRP mg/L  --  16 7*   SED RATE mm/hour 23*  --      Results from last 7 days   Lab Units 01/02/22  0822   CLARITY UA  Clear   COLOR UA  Yellow   SPEC GRAV UA  <=1 005   PH UA  5 5   GLUCOSE UA mg/dl Negative   KETONES UA mg/dl Negative   BLOOD UA  Negative   PROTEIN UA mg/dl Trace*   NITRITE UA  Negative   BILIRUBIN UA  Negative   UROBILINOGEN UA E U /dl 0 2   LEUKOCYTES UA  Negative   WBC UA /hpf 1-2   RBC UA /hpf 0-1   BACTERIA UA /hpf Occasional   EPITHELIAL CELLS WET PREP /hpf Occasional     Results from last 7 days   Lab Units 01/02/22  0828   INFLUENZA A PCR  Negative   INFLUENZA B PCR  Negative   RSV PCR  Negative     Results from last 7 days   Lab Units 01/02/22  0711   BLOOD CULTURE  Received in Microbiology Lab  Culture in Progress  Received in Microbiology Lab  Culture in Progress       ED Treatment:   Medication Administration from 01/02/2022 0625 to 01/02/2022 1529       Date/Time Order Dose Route Action Action by Comments     01/02/2022 0720 sodium chloride 0 9 % bolus 1,000 mL 0 mL Intravenous Stopped Ivana Mauricio RN      01/02/2022 0630 sodium chloride 0 9 % bolus 1,000 mL 1,000 mL Intravenous [address] Hamida Glez RN      01/02/2022 0629 ondansetron (ZOFRAN) injection 4 mg 4 mg Intravenous Given Hamida Glez RN      01/02/2022 0630 HYDROmorphone (DILAUDID) injection 1 mg 1 mg Intravenous Given Hamida Glez RN      01/02/2022 0710 HYDROmorphone (DILAUDID) injection 1 mg 1 mg Intravenous Given Ivana Mauricio RN      01/02/2022 2541 sodium chloride 0 9 % bolus 1,000 mL 0 mL Intravenous Stopped Ivana Mauricio RN      01/02/2022 0710 sodium chloride 0 9 % bolus 1,000 mL 1,000 mL Intravenous [address] Ivana Mauricio RN      01/02/2022 0655 iohexol (OMNIPAQUE) 350 MG/ML injection (SINGLE-DOSE) 100 mL 100 mL Intravenous Given James Becerra      01/02/2022 0725 fentanyl citrate (PF) 100 MCG/2ML 50 mcg 50 mcg Intravenous Given Marisel Quintana RN      01/02/2022 0431 sodium chloride 0 9 % bolus 1,000 mL 0 mL Intravenous Stopped Peyton Apple RN      01/02/2022 0805 sodium chloride 0 9 % bolus 1,000 mL 1,000 mL Intravenous [address] Mariajose Verdin RN      01/02/2022 9157 HYDROmorphone (DILAUDID) injection 1 mg 1 mg Intravenous Given Mariajose Verdin RN      01/02/2022 0854 piperacillin-tazobactam (ZOSYN) IVPB 3 375 g 0 g Intravenous Stopped Peyton Apple RN      01/02/2022 0824 piperacillin-tazobactam (ZOSYN) IVPB 3 375 g 3 375 g Intravenous [address] Mariajose Verdin RN      01/02/2022 1114 HYDROmorphone (DILAUDID) injection 1 mg 1 mg Intravenous Given Heath Escobar RN      01/02/2022 1119 vancomycin (VANCOCIN) oral solution 125 mg 125 mg Oral Given Heath Escobar RN      01/02/2022 1114 ondansetron (ZOFRAN) injection 4 mg 4 mg Intravenous Given Heath Escobar RN      01/02/2022 1419 piperacillin-tazobactam (ZOSYN) IVPB 3 375 g 0 g Intravenous Stopped Heath Escobar RN      01/02/2022 1345 piperacillin-tazobactam (ZOSYN) IVPB 3 375 g 3 375 g Intravenous [address] Heath Escobar RN      01/02/2022 1320 DULoxetine (CYMBALTA) delayed release capsule 30 mg 30 mg Oral Given Heath Escobar RN      01/02/2022 1320 lactated ringers infusion 150 mL/hr Intravenous [address] Heath Escobar RN      01/02/2022 1319 promethazine (PHENERGAN) injection 25 mg 25 mg Intravenous Given Heath Escobar RN      01/02/2022 1413 acetaminophen (TYLENOL) tablet 650 mg 650 mg Oral Given Heath Escobar RN fever 101        Past Medical History:   Diagnosis Date    [facility]ylosing spondylitis ([facility] [address] )     Anxiety     Bowel obstruction ([address] )     Clostridium difficile colitis 9/13/2018    Colitis     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[facility] [address] ) 9/13/2018    Pancreatitis     Ulcerative colitis ([facility] [address] )      Present on Admission:   (Resolved) Thrombocytosis   SARAHI (acute kidney injury) ([facility] [address] )   Sepsis ([facility] [address] )   Abdominal pain with hx of Ulcerative Colitis   Ankylosing spondylitis (HCC)   Elevated LFTs      Admitting Diagnosis: Enteritis [K52 9]  Abdominal pain [R10 9]  Age/Sex: 27 y o  male  Admission Orders:  Gmf  Npo > advance liquid 1/3  Stool studies  Stool c diff  US RUQ  Bmp cbc     Scheduled Medications:  DULoxetine, 30 mg, Oral, Daily  piperacillin-tazobactam, 3 375 g, Intravenous, Q6H  vancomycin, 125 mg, Oral, Q6H KAITLIN      Continuous IV Infusions:  lactated ringers, 100 mL/hr, Intravenous, Continuous      PRN Meds:  HYDROmorphone, 0 5 mg, Intravenous, Q4H PRN x1  HYDROmorphone, 1 mg, Intravenous, Q4H PRN x3  ondansetron, 4 mg, Intravenous, Q6H PRN  promethazine, 25 mg, Intravenous, Q6H PRN        IP CONSULT TO ACUTE CARE SURGERY  IP CONSULT TO GASTROENTEROLOGY    Network Utilization Review Department  ATTENTION: Please call with any questions or concerns to [phone] and carefully listen to the prompts so that you are directed to the right person  All voicemails are confidential   Camille Martinez all requests for admission clinical reviews, approved or denied determinations and any other requests to dedicated fax number below belonging to the campus where the patient is receiving treatment   List of dedicated fax numbers for the Facilities:  [address] DENIALS (Administrative/Medical Necessity) [phone]   [address] (Maternity/NICU/Pediatrics) [phone]   [address] [place] [address] [address] [facility] Renetta Vogtoniel Zaheer [address] [id number] [address]   [place] [phone]   [place] [address] [place] [address] [phone]

## 2022-01-04 VITALS
TEMPERATURE: 97.6 F | BODY MASS INDEX: 27.27 KG/M2 | DIASTOLIC BLOOD PRESSURE: 76 MMHG | OXYGEN SATURATION: 96 % | WEIGHT: 184.08 LBS | HEART RATE: 79 BPM | RESPIRATION RATE: 19 BRPM | HEIGHT: 69 IN | SYSTOLIC BLOOD PRESSURE: 126 MMHG

## 2022-01-04 PROBLEM — Z87.19 HISTORY OF ULCERATIVE COLITIS: Status: ACTIVE | Noted: 2019-01-05

## 2022-01-04 LAB
ANION GAP SERPL CALCULATED.3IONS-SCNC: 5 MMOL/L (ref 4–13)
BUN SERPL-MCNC: 7 MG/DL (ref 5–25)
CALCIUM SERPL-MCNC: 8.6 MG/DL (ref 8.3–10.1)
CHLORIDE SERPL-SCNC: 101 MMOL/L (ref 100–108)
CO2 SERPL-SCNC: 30 MMOL/L (ref 21–32)
CREAT SERPL-MCNC: 1.15 MG/DL (ref 0.6–1.3)
ERYTHROCYTE [DISTWIDTH] IN BLOOD BY AUTOMATED COUNT: 16.1 % (ref 11.6–15.1)
GFR SERPL CREATININE-BSD FRML MDRD: 86 ML/MIN/1.73SQ M
GLUCOSE SERPL-MCNC: 103 MG/DL (ref 65–140)
HCT VFR BLD AUTO: 38.2 % (ref 36.5–49.3)
HGB BLD-MCNC: 11.3 G/DL (ref 12–17)
MCH RBC QN AUTO: 19 PG (ref 26.8–34.3)
MCHC RBC AUTO-ENTMCNC: 29.6 G/DL (ref 31.4–37.4)
MCV RBC AUTO: 64 FL (ref 82–98)
PLATELET # BLD AUTO: 341 THOUSANDS/UL (ref 149–390)
PMV BLD AUTO: 8.6 FL (ref 8.9–12.7)
POTASSIUM SERPL-SCNC: 3.3 MMOL/L (ref 3.5–5.3)
RBC # BLD AUTO: 5.95 MILLION/UL (ref 3.88–5.62)
SODIUM SERPL-SCNC: 136 MMOL/L (ref 136–145)
WBC # BLD AUTO: 8.16 THOUSAND/UL (ref 4.31–10.16)

## 2022-01-04 PROCEDURE — 99239 HOSP IP/OBS DSCHRG MGMT >30: CPT | Performed by: INTERNAL MEDICINE

## 2022-01-04 PROCEDURE — 85027 COMPLETE CBC AUTOMATED: CPT | Performed by: FAMILY MEDICINE

## 2022-01-04 PROCEDURE — 99232 SBSQ HOSP IP/OBS MODERATE 35: CPT | Performed by: SPECIALIST

## 2022-01-04 PROCEDURE — 99232 SBSQ HOSP IP/OBS MODERATE 35: CPT | Performed by: NURSE PRACTITIONER

## 2022-01-04 PROCEDURE — 80048 BASIC METABOLIC PNL TOTAL CA: CPT | Performed by: FAMILY MEDICINE

## 2022-01-04 RX ORDER — CIPROFLOXACIN 500 MG/1
500 TABLET, FILM COATED ORAL EVERY 12 HOURS SCHEDULED
Qty: 10 TABLET | Refills: 0 | Status: SHIPPED | OUTPATIENT
Start: 2022-01-04 | End: 2022-01-09

## 2022-01-04 RX ORDER — OXYCODONE HYDROCHLORIDE 5 MG/1
5 TABLET ORAL EVERY 6 HOURS PRN
Qty: 15 TABLET | Refills: 0 | Status: ON HOLD | OUTPATIENT
Start: 2022-01-04 | End: 2022-03-03 | Stop reason: SDUPTHER

## 2022-01-04 RX ORDER — POTASSIUM CHLORIDE 20 MEQ/1
40 TABLET, EXTENDED RELEASE ORAL ONCE
Status: COMPLETED | OUTPATIENT
Start: 2022-01-04 | End: 2022-01-04

## 2022-01-04 RX ORDER — ONDANSETRON 4 MG/1
4 TABLET, FILM COATED ORAL EVERY 8 HOURS PRN
Qty: 20 TABLET | Refills: 0 | Status: SHIPPED | OUTPATIENT
Start: 2022-01-04 | End: 2022-03-12 | Stop reason: HOSPADM

## 2022-01-04 RX ORDER — METRONIDAZOLE 500 MG/1
500 TABLET ORAL EVERY 8 HOURS SCHEDULED
Qty: 15 TABLET | Refills: 0 | Status: SHIPPED | OUTPATIENT
Start: 2022-01-04 | End: 2022-01-09

## 2022-01-04 RX ADMIN — DULOXETINE HYDROCHLORIDE 30 MG: 30 CAPSULE, DELAYED RELEASE ORAL at 09:21

## 2022-01-04 RX ADMIN — SODIUM CHLORIDE, SODIUM LACTATE, POTASSIUM CHLORIDE, AND CALCIUM CHLORIDE 100 ML/HR: .6; .31; .03; .02 INJECTION, SOLUTION INTRAVENOUS at 00:08

## 2022-01-04 RX ADMIN — PIPERACILLIN AND TAZOBACTAM 3.38 G: 36; 4.5 INJECTION, POWDER, FOR SOLUTION INTRAVENOUS at 09:21

## 2022-01-04 RX ADMIN — Medication 125 MG: at 00:04

## 2022-01-04 RX ADMIN — POTASSIUM CHLORIDE 40 MEQ: 1500 TABLET, EXTENDED RELEASE ORAL at 13:27

## 2022-01-04 RX ADMIN — HYDROMORPHONE HYDROCHLORIDE 0.5 MG: 1 INJECTION, SOLUTION INTRAMUSCULAR; INTRAVENOUS; SUBCUTANEOUS at 04:28

## 2022-01-04 RX ADMIN — Medication 125 MG: at 05:40

## 2022-01-04 RX ADMIN — Medication 125 MG: at 13:27

## 2022-01-04 RX ADMIN — PIPERACILLIN AND TAZOBACTAM 3.38 G: 36; 4.5 INJECTION, POWDER, FOR SOLUTION INTRAVENOUS at 04:21

## 2022-01-04 NOTE — PLAN OF CARE
Problem: GASTROINTESTINAL - ADULT  Goal: Minimal or absence of nausea and/or vomiting  Description: INTERVENTIONS:  - Administer IV fluids if ordered to ensure adequate hydration  - Maintain NPO status until nausea and vomiting are resolved  - Nasogastric tube if ordered  - Administer ordered antiemetic medications as needed  - Provide nonpharmacologic comfort measures as appropriate  - Advance diet as tolerated, if ordered  - Consider nutrition services referral to assist patient with adequate nutrition and appropriate food choices  Outcome: Progressing  Goal: Maintains or returns to baseline bowel function  Description: INTERVENTIONS:  - Assess bowel function  - Encourage oral fluids to ensure adequate hydration  - Administer IV fluids if ordered to ensure adequate hydration  - Administer ordered medications as needed  - Encourage mobilization and activity  - Consider nutritional services referral to assist patient with adequate nutrition and appropriate food choices  Outcome: Progressing  Goal: Maintains adequate nutritional intake  Description: INTERVENTIONS:  - Monitor percentage of each meal consumed  - Identify factors contributing to decreased intake, treat as appropriate  - Assist with meals as needed  - Monitor I&O, weight, and lab values if indicated  - Obtain nutrition services referral as needed  Outcome: Progressing  Goal: Establish and maintain optimal ostomy function  Description: INTERVENTIONS:  - Assess bowel function  - Encourage oral fluids to ensure adequate hydration  - Administer IV fluids if ordered to ensure adequate hydration   - Administer ordered medications as needed  - Encourage mobilization and activity  - Nutrition services referral to assist patient with appropriate food choices  - Assess stoma site  - Consider wound care consult   Outcome: Progressing  Goal: Oral mucous membranes remain intact  Description: INTERVENTIONS  - Assess oral mucosa and hygiene practices  - Implement preventative oral hygiene regimen  - Implement oral medicated treatments as ordered  - Initiate Nutrition services referral as needed  Outcome: Progressing     Problem: PAIN - ADULT  Goal: Verbalizes/displays adequate comfort level or baseline comfort level  Description: Interventions:  - Encourage patient to monitor pain and request assistance  - Assess pain using appropriate pain scale  - Administer analgesics based on type and severity of pain and evaluate response  - Implement non-pharmacological measures as appropriate and evaluate response  - Consider cultural and social influences on pain and pain management  - Notify physician/advanced practitioner if interventions unsuccessful or patient reports new pain  Outcome: Progressing     Problem: INFECTION - ADULT  Goal: Absence or prevention of progression during hospitalization  Description: INTERVENTIONS:  - Assess and monitor for signs and symptoms of infection  - Monitor lab/diagnostic results  - Monitor all insertion sites, i e  indwelling lines, tubes, and drains  - Monitor endotracheal if appropriate and nasal secretions for changes in amount and color  - Paulina appropriate cooling/warming therapies per order  - Administer medications as ordered  - Instruct and encourage patient and family to use good hand hygiene technique  - Identify and instruct in appropriate isolation precautions for identified infection/condition  Outcome: Progressing  Goal: Absence of fever/infection during neutropenic period  Description: INTERVENTIONS:  - Monitor WBC    Outcome: Progressing     Problem: DISCHARGE PLANNING  Goal: Discharge to home or other facility with appropriate resources  Description: INTERVENTIONS:  - Identify barriers to discharge w/patient and caregiver  - Arrange for needed discharge resources and transportation as appropriate  - Identify discharge learning needs (meds, wound care, etc )  - Arrange for interpretive services to assist at discharge as needed  - Refer to Case Management Department for coordinating discharge planning if the patient needs post-hospital services based on physician/advanced practitioner order or complex needs related to functional status, cognitive ability, or social support system  Outcome: Progressing     Problem: Knowledge Deficit  Goal: Patient/family/caregiver demonstrates understanding of disease process, treatment plan, medications, and discharge instructions  Description: Complete learning assessment and assess knowledge base    Interventions:  - Provide teaching at level of understanding  - Provide teaching via preferred learning methods  Outcome: Progressing

## 2022-01-04 NOTE — PROGRESS NOTES
Progress Note - [facility]  Marletta Comfort 27 y o  male MRN: [id number]    Unit/Bed#: [address] [place] [address] Encounter: [id number]      Assessment/Plan:  1  Enteritis and sepsis in the setting of immunosuppression, hx of total colectomy and J pouch for Ulcerative Colitis  27year old male with history of ankylosing spondylitis on xeljanz and ulcerative colitis status post proctocolectomy and J-pouch formation with jpouch dysfunction and angulated pouch who presents with acute onset severe burning umbilical pain associated with non bloody diarrhea and vomiting  He met sepsis criteria on admission, creatinine 2 58, elevated lactic acid, and significant leukocytosis with WBC 29, also elevation of ALT, alkaline phosphatase  Normal T  Bili and lipase  CT imaging showed status post colectomy and ileal pouch-anal anastomosis   No evidence of obstruction, but mild bowel wall thickening and mucosal hyperemia in the right lower quadrant small bowel just proximal to the ileal pouch suspicious for an infectious or inflammatory enteritis  Differentials include biliary source, forming abscess, infectious enteritis  No current evidence of perforation  COVID-19 negative  C reactive protein 16 7 and sed rate 23  Elevated LFTs on admission have resolved  Leukocytosis has resolved, WBC 8 85  Patient reported bowel movements are at baseline  Denies any blood in stool or blood from rectal area  No further nausea or vomiting  Denies abdominal pain    Afebrile for past 24 hours      -Surgery following    -Stool for  C diff PCR, enteric stool panel, Ova  and parasite pending  - RUQ US pending  -Blood cultures pending   -Continue supportive care for sepsis per primary team  -Continue pain management per primary team  -Zofran as needed for nausea or vomiting  -Diet as tolerated  -Continue antibiotics  -Follow up surgeon as outpatient for colonoscopy-Dr Marylen Lemme in [place]   -Follow-up with GI as outpatient    Subjective:   Lying in bed in no acute distress  Patient denies any further nausea, vomiting, or abdominal pain  Tolerating diet  Denies acid reflux or heartburn  Patient denies any signs of GI bleed  Denies bright red blood in stool, bright red blood from rectal area or black tarry stool  Patient would like to go home  Objective:     Vitals: Blood pressure 126/76, pulse 79, temperature 97 6 °F (36 4 °C), resp  rate 19, height 5' 9" (1 753 m), weight 83 5 kg (184 lb 1 4 oz), SpO2 96 %  ,Body mass index is 27 18 kg/m²  No intake or output data in the 24 hours ending 01/04/22 1325    Physical Exam:   Vitals and nursing note reviewed  Constitutional:       General: He is not in acute distress  Appearance: He is not ill-appearing  Cardiovascular:      Rate and Rhythm: Normal rate and regular rhythm  Pulses: Normal pulses  Heart sounds: Normal heart sounds  Pulmonary:      Effort: Pulmonary effort is normal       Breath sounds: Normal breath sounds  Abdominal:      General: Bowel sounds are normal  There is no distension  Palpations: Abdomen is soft  There is no mass  Tenderness: There is no abdominal tenderness  There is no guarding or rebound  Hernia: No hernia is present  Comments: Previous surgical scars noted on abdomen  Musculoskeletal:      Right lower leg: No edema  Left lower leg: No edema  Skin:     Capillary Refill: Capillary refill takes less than 2 seconds  Coloration: Skin is not jaundiced or pale  Neurological:      Mental Status: He is alert and oriented to person, place, and time     Psychiatric:         Mood and Affect: Mood normal    Invasive Devices  Report    Peripheral Intravenous Line            Peripheral IV 01/02/22 Right Antecubital 2 days                Current Facility-Administered Medications:     DULoxetine (CYMBALTA) delayed release capsule 30 mg, 30 mg, Oral, Daily, Darlin Martinez DO, 30 mg at 01/04/22 0921    HYDROmorphone (DILAUDID) injection 0 5 mg, 0 5 mg, Intravenous, Q4H PRN, Darlin Revankar, DO, 0 5 mg at 01/04/22 0428    HYDROmorphone (DILAUDID) injection 1 mg, 1 mg, Intravenous, Q4H PRN, Darlin Revankar, DO, 1 mg at 01/03/22 0448    lactated ringers infusion, 100 mL/hr, Intravenous, Continuous, Darlin Revankar, DO, Last Rate: 100 mL/hr at 01/04/22 0008, 100 mL/hr at 01/04/22 0008    ondansetron (ZOFRAN) injection 4 mg, 4 mg, Intravenous, Q6H PRN, Darlin Revankar, DO    piperacillin-tazobactam (ZOSYN) IVPB 3 375 g, 3 375 g, Intravenous, Q6H, Darlin Revankar, DO, Last Rate: 0 mL/hr at 01/02/22 1419, 3 375 g at 01/04/22 0921    potassium chloride (K-DUR,KLOR-CON) CR tablet 40 mEq, 40 mEq, Oral, Once, Carlos Isac, DO    promethazine (PHENERGAN) injection 25 mg, 25 mg, Intravenous, Q6H PRN, Darlin Revankar, DO    vancomycin (VANCOCIN) oral solution 125 mg, 125 mg, Oral, Q6H KAITLIN, Darlin Revankar, DO, 125 mg at 01/04/22 0540    Lab Results:   Recent Results (from the past 24 hour(s))   Basic metabolic panel    Collection Time: 01/04/22  5:37 AM   Result Value Ref Range    Sodium 136 136 - 145 mmol/L    Potassium 3 3 (L) 3 5 - 5 3 mmol/L    Chloride 101 100 - 108 mmol/L    CO2 30 21 - 32 mmol/L    ANION GAP 5 4 - 13 mmol/L    BUN 7 5 - 25 mg/dL    Creatinine 1 15 0 60 - 1 30 mg/dL    Glucose 103 65 - 140 mg/dL    Calcium 8 6 8 3 - 10 1 mg/dL    eGFR 86 ml/min/1 73sq m   CBC    Collection Time: 01/04/22  5:37 AM   Result Value Ref Range    WBC 8 16 4 31 - 10 16 Thousand/uL    RBC 5 95 (H) 3 88 - 5 62 Million/uL    Hemoglobin 11 3 (L) 12 0 - 17 0 g/dL    Hematocrit 38 2 36 5 - 49 3 %    MCV 64 (L) 82 - 98 fL    MCH 19 0 (L) 26 8 - 34 3 pg    MCHC 29 6 (L) 31 4 - 37 4 g/dL    RDW 16 1 (H) 11 6 - 15 1 %    Platelets 899 823 - 623 Thousands/uL    MPV 8 6 (L) 8 9 - 12 7 fL             Imaging Studies: US right upper quadrant    Result Date: 1/3/2022  Narrative: RIGHT UPPER QUADRANT ULTRASOUND INDICATION:     elevated LFTs, sepsis   COMPARISON:  CT 1/2/2022 TECHNIQUE:   Real-time ultrasound of the right upper quadrant was performed with a curvilinear transducer with both volumetric sweeps and still imaging techniques  FINDINGS: PANCREAS:  Visualized portions of the pancreas are within normal limits  AORTA AND IVC:  Visualized portions are normal for patient age  LIVER: Size:  Within normal range  The liver measures 17 5 cm in the midclavicular line  Contour:  Surface contour is smooth  Parenchyma:  Echogenicity and echotexture are within normal limits  No evidence of suspicious mass  Limited imaging of the main portal vein shows it to be patent and hepatopetal   BILIARY: No gallbladder findings  No intrahepatic biliary dilatation  CBD measures 3 mm  No choledocholithiasis  KIDNEY: Right kidney measures 11 4 x 4 9 x 5 2 cm  Within normal limits  ASCITES:   None  Impression: Normal  Workstation performed: [id number]     CT abdomen pelvis with contrast    Result Date: 1/2/2022  Narrative: CT ABDOMEN AND PELVIS WITH IV CONTRAST INDICATION:   Abdominal pain, acute, nonlocalized abdominal pain,vomiting  History of small bowel obstruction and colitis  COMPARISON:  Abdomen and pelvic CT from 1/12/2020  Older CTs, dating back to 2/9/2019 were also reviewed  TECHNIQUE:  CT examination of the abdomen and pelvis was performed  Axial, sagittal, and coronal 2D reformatted images were created from the source data and submitted for interpretation  Radiation dose length product (DLP) for this visit:  706 mGy-cm   This examination, like all CT scans performed in the [facility], was performed utilizing techniques to minimize radiation dose exposure, including the use of iterative reconstruction and automated exposure control  IV Contrast:  100 mL of iohexol (OMNIPAQUE) Enteric Contrast:  Enteric contrast was not administered   FINDINGS: ABDOMEN LOWER CHEST:  No clinically significant abnormality identified in the visualized lower chest  LIVER/BILIARY TREE: Unremarkable  GALLBLADDER:  No calcified gallstones  No pericholecystic inflammatory change  SPLEEN:  Unremarkable  PANCREAS:  Unremarkable  ADRENAL GLANDS:  Unremarkable  KIDNEYS/URETERS:  Unremarkable  No hydronephrosis  STOMACH AND BOWEL:  Patient is status post colectomy and ileal pouch-anal anastomosis  There is no evidence of obstruction, but there is mild bowel wall thickening and mucosal hyperemia in a right lower quadrant small bowel just proximal to the ileal pouch (series 2 images 51-75, and series 601 images 64-83)  This is suspicious for an infectious or inflammatory enteritis  APPENDIX:  No findings to suggest appendicitis  ABDOMINOPELVIC CAVITY:  There is a 1 2 cm aortocaval node (series 2 image 49) and a 1 5 cm right external iliac node  (Series 2 image 68 ) Both have demonstrated long-term stability  VESSELS:  Unremarkable for patient's age  PELVIS REPRODUCTIVE ORGANS:  Unremarkable for patient's age  URINARY BLADDER:  Unremarkable  ABDOMINAL WALL/INGUINAL REGIONS:  Unremarkable  OSSEOUS STRUCTURES:  No acute fracture or destructive osseous lesion  Impression: Patient is status post colectomy and ileal pouch-anal anastomosis  There is no evidence of obstruction, but there is mild bowel wall thickening and mucosal hyperemia in a right lower quadrant small bowel just proximal to the ileal pouch (series 2 images 51-75, and series 601 images 64-83)  This is suspicious for an infectious or inflammatory enteritis  Again seen is aortocaval and right external iliac adenopathy, which have demonstrated long-term stability  Workstation performed: [id number]           Trecia Goldmann, CRNP      Please Note: "This note has been constructed using a voice recognition system  Therefore there may be syntax, spelling, and/or grammatical errors   Please call if you have any questions  "**

## 2022-01-04 NOTE — PLAN OF CARE
Problem: PAIN - ADULT  Goal: Verbalizes/displays adequate comfort level or baseline comfort level  Description: Interventions:  - Encourage patient to monitor pain and request assistance  - Assess pain using appropriate pain scale  - Administer analgesics based on type and severity of pain and evaluate response  - Implement non-pharmacological measures as appropriate and evaluate response  - Consider cultural and social influences on pain and pain management  - Notify physician/advanced practitioner if interventions unsuccessful or patient reports new pain  Outcome: Progressing     Problem: INFECTION - ADULT  Goal: Absence or prevention of progression during hospitalization  Description: INTERVENTIONS:  - Assess and monitor for signs and symptoms of infection  - Monitor lab/diagnostic results  - Monitor all insertion sites, i e  indwelling lines, tubes, and drains  - Monitor endotracheal if appropriate and nasal secretions for changes in amount and color  - [place] appropriate cooling/warming therapies per order  - Administer medications as ordered  - Instruct and encourage patient and family to use good hand hygiene technique  - Identify and instruct in appropriate isolation precautions for identified infection/condition  Outcome: Progressing  Goal: Absence of fever/infection during neutropenic period  Description: INTERVENTIONS:  - Monitor WBC    Outcome: Progressing

## 2022-01-04 NOTE — DISCHARGE SUMMARY
Julian 43  Discharge- Maribell Alicia 1995, 27 y o  male MRN: [id number]  Unit/Bed#: [address] [place] [address] Encounter: [id number]  Primary Care Provider: Shakila Sparks DO   Date and time admitted to hospital: 1/2/2022  6:25 AM    Admitting Provider:  Bre Glez DO  Discharge Provider:  Sonny Shields DO  Admission Date: 1/2/2022       Discharge Date: 01/04/22   LOS: 2  Primary Care Physician at Discharge: Shakila Sparks [phone]    DISCHARGE DIAGNOSES  History of ulcerative colitis  Assessment & Plan  · History of ulcer colitis status post total colectomy and J-pouch ileoanal anastomosis  · [facility]  · During hospitalization treated for enteritis and was seen by surgery  · Currently on zosyn/vancomycin  Stool studies negative  · Will discharge with ciprofloxacin/metronidazole for five more days to complete seven day course of antibiotics empirically  Elevated LFTs  Assessment & Plan  · Mild transaminitis may be viral in nature  Resolved  · Abdominal ultrasound without acute pathology  Results from last 7 days   Lab Units 01/03/22  0513 01/02/22  0630   AST U/L 25 37   ALT U/L 48 82*   TOTAL BILIRUBIN mg/dL 0 81 0 87       Ankylosing spondylitis (HCC)  Assessment & Plan  · On xeljanz prior to admission    SARAHI (acute kidney injury) ([address] )  Assessment & Plan  · A KI secondary to prerenal azotemia/poor intake from abdominal pain  Resolved    Results from last 7 days   Lab Units 01/04/22  0537 01/03/22  0513 01/02/22  0630   BUN mg/dL 7 11 22   CREATININE mg/dL 1 15 1 31* 2 58*   EGFR ml/min/1 73sq m 86 73 32       * Sepsis (HCC)  Assessment & Plan  · Sepsis secondary to enteritis/pouchitis  · Was on broad-spectrum antibiotics as listed below  Stool studies negative      Results from last 7 days   Lab Units 01/02/22  0959 01/02/22  0711   LACTIC ACID mmol/L 1 3 3 5*       Thrombocytosis-resolved as of 1/3/2022  Assessment & Plan  · Likely reactive  Resolved    Results from last 7 days   Lab Units 01/04/22  0537 01/03/22  0513 01/02/22  0630   PLATELETS Thousands/uL 341 345 [address] COURSE:  Tc Pearce is a 27 y o  male with a history of ulcerative colitis status post total colectomy/ileal anal anastomosis who presented to the hospital with worsening abdominal pain  In the ER he was noted to have leukocytosis of 29k and lactic acidosis prompting admission for sepsis  During hospitalization he was followed closely by general surgery given concerns of proctitis/enteritis  His diet was slowly advanced and on day of discharge he is tolerating solid food without difficulty  He is being discharged with antibiotics as listed above  Please see problem list listed above  CONSULTING PROVIDERS   IP CONSULT TO ACUTE CARE SURGERY  IP CONSULT TO GASTROENTEROLOGY    PROCEDURES PERFORMED  * No surgery found *    RADIOLOGY RESULTS  US right upper quadrant    Result Date: 1/3/2022  Impression: Normal  Workstation performed: [id number]     CT abdomen pelvis with contrast    Result Date: 1/2/2022  Impression: Patient is status post colectomy and ileal pouch-anal anastomosis  There is no evidence of obstruction, but there is mild bowel wall thickening and mucosal hyperemia in a right lower quadrant small bowel just proximal to the ileal pouch (series 2 images 51-75, and series 601 images 64-83)  This is suspicious for an infectious or inflammatory enteritis  Again seen is aortocaval and right external iliac adenopathy, which have demonstrated long-term stability   Workstation performed: [id number]       LABS  Results from last 7 days   Lab Units 01/04/22  0537 01/03/22  0513 01/02/22  0630   WBC Thousand/uL 8 16 8 85 29 85*   HEMOGLOBIN g/dL 11 3* 11 5* 15 3   HEMATOCRIT % 38 2 38 0 51 4*   MCV fL 64* 64* 64*   PLATELETS Thousands/uL 341 345 546*     Results from last 7 days   Lab Units 01/04/22  0537 01/03/22  0513 01/02/22  0630   SODIUM mmol/L 136 135* 136   POTASSIUM mmol/L 3 3* 3 6 4 4   CHLORIDE mmol/L 101 99* 97*   CO2 mmol/L 30 28 25   BUN mg/dL 7 11 22   CREATININE mg/dL 1 15 1 31* 2 58*   CALCIUM mg/dL 8 6 8 6 10 2*   ALBUMIN g/dL  --  3 1* 4 6   TOTAL BILIRUBIN mg/dL  --  0 81 0 87   ALK PHOS U/L  --  79 122*   ALT U/L  --  48 82*   AST U/L  --  25 37   EGFR ml/min/1 73sq m 86 73 32   GLUCOSE RANDOM mg/dL 103 86 134     Results from last 7 days   Lab Units 01/02/22  0630   CK TOTAL U/L 172   CK MB INDEX % <1 0         Results from last 7 days   Lab Units 01/02/22  0959 01/02/22  0711   LACTIC ACID mmol/L 1 3 3 5*           Cultures:   Results from last 7 days   Lab Units 01/02/22  0822   COLOR UA  Yellow   CLARITY UA  Clear   SPEC GRAV UA  <=1 005   PH UA  5 5   LEUKOCYTES UA  Negative   NITRITE UA  Negative   GLUCOSE UA mg/dl Negative   KETONES UA mg/dl Negative   BILIRUBIN UA  Negative   BLOOD UA  Negative      Results from last 7 days   Lab Units 01/02/22  0822   RBC UA /hpf 0-1   WBC UA /hpf 1-2   EPITHELIAL CELLS WET PREP /hpf Occasional   BACTERIA UA /hpf Occasional      Results from last 7 days   Lab Units 01/02/22  1330 01/02/22  0828 01/02/22  0711   BLOOD CULTURE   --   --  No Growth at 24 hrs  No Growth at 24 hrs  INFLUENZA A PCR   --  Negative  --    C DIFF TOXIN B BY PCR  Negative  --   --      Results from last 7 days   Lab Units 01/02/22  0828   SARS-COV-2  Negative   INFLUENZA A PCR  Negative   INFLUENZA B PCR  Negative   RSV PCR  Negative             DISCHARGE DAY VISIT AND PHYSICAL EXAM:  Subjective:  Patient seen examined during morning rounds  No complaints  Tolerating liquid diet      Vitals:   Blood Pressure: 126/76 (01/04/22 0920)  Pulse: 79 (01/03/22 1033)  Temperature: 97 6 °F (36 4 °C) (01/04/22 0920)  Temp Source: Oral (01/03/22 1033)  Respirations: 19 (01/04/22 0920)  Height: 5' 9" (175 3 cm) (01/02/22 1536)  Weight - Scale: 83 5 kg (184 lb 1 4 oz) (01/02/22 1536)  SpO2: 96 % (01/03/22 1033)    Physical Exam  Vitals reviewed  Constitutional:       General: He is not in acute distress  Cardiovascular:      Rate and Rhythm: Regular rhythm  Pulmonary:      Effort: Pulmonary effort is normal       Breath sounds: No wheezing  Abdominal:      General: Bowel sounds are normal       Palpations: Abdomen is soft  Tenderness: There is no abdominal tenderness  There is no rebound  Musculoskeletal:         General: No swelling or tenderness  Skin:     General: Skin is warm and dry  Neurological:      General: No focal deficit present  Mental Status: He is alert  Cranial Nerves: No cranial nerve deficit  Psychiatric:         Mood and Affect: Mood normal        Planned Re-admission:  No  Discharge Disposition: Home/Self Care    Test Results Pending at Discharge:   Pending Labs     Order Current Status    Ova and parasite examination In process    Blood culture #1 Preliminary result    Blood culture #2 Preliminary result      Incidental findings:     Medications   · Discharge Medication List: See after visit summary for reconciled discharge medications  Diet restrictions:  Soft diet   Activity restrictions: No strenuous activity  Discharge Condition: stable    Outpatient Follow-Up and Discharge Instructions  See after visit summary section titled Discharge Instructions for information provided to patient and family  Code Status: Level 1 - Full Code  Discharge Statement   I spent 35 minutes discharging the patient  This time was spent on the day of discharge  Greater than 50% of total time was spent with the patient and / or family counseling and / or coordination of care  ** Please Note: This note has been constructed using a voice recognition system   **

## 2022-01-04 NOTE — PROGRESS NOTES
Progress Note - General Surgery   Van Bodily 27 y o  male MRN: [id number]  Unit/Bed#: [address] [place] [address] Encounter: [id number]    Assessment:  1) Sepsis - resolved, ultrasound negative for any sort of acute cholecystitis, leukocytosis resolved, AVSS, patient feels much improved, SARAHI resolved likely prerenal in nature, clear lactic acid  2) Ulcerative Colitis -  stable  3) History of Total Colectomy with J pouch -  stable  4) Abdominal Pain - resolved, sepsis resolved, tolerating full liquids, increased appetite, return to baseline on loose bowel movements, no nausea or vomiting    Plan:  1-4)   - follow-up potentially with heme a due to consistent lymphadenopathy on repeat CT scans  - discussing coordinated with Internal Medicine team and complete 7 day course of p o  antibiotics with Augmentin, currently on day 3/7  - advance diet as tolerated  - p o  potassium  - patient Education  - referral to colorectal group and Dr Landen Dorsey from Gastroenterology  - encourage p o  liquid intake  - discharge likely today by mid day 1/4/22    Subjective/Objective   Chief Complaint:  I feel much better I would love it if I can be discharged today    Subjective:  Patient was seen examined at bedside  Patient denies any acute events overnight  Patient denies any fevers or chills  Patient denies any nausea vomiting  Patient is still having loose bowel movements per his baseline after having total colectomy  Patient is urinating/voiding well  Patient was educated on his SARAHI being likely prerenal and how to address this for future if he continues to have gastroenteritis  Patient was educated on due to his immunosuppressed status that we may continue to complete an antibiotic course per the medicine team   Patient denies any abdominal pain  Patient feels much improved  Patient has increased appetite    Patient discusses at length how he would like to establish with gastroenterology and possibly colorectal within this hospital not work due to its close proximity to where he lives and works  Objective:     Blood pressure 126/76, pulse 79, temperature 97 6 °F (36 4 °C), resp  rate 19, height 5' 9" (1 753 m), weight 83 5 kg (184 lb 1 4 oz), SpO2 96 %  ,Body mass index is 27 18 kg/m²  No intake or output data in the 24 hours ending 01/04/22 1005    Invasive Devices  Report    Peripheral Intravenous Line            Peripheral IV 01/02/22 Right Antecubital 2 days                Physical Exam: /76   Pulse 79   Temp 97 6 °F (36 4 °C)   Resp 19   Ht 5' 9" (1 753 m)   Wt 83 5 kg (184 lb 1 4 oz)   SpO2 96%   BMI 27 18 kg/m²   General appearance: alert and oriented, in no acute distress  Head: Normocephalic, without obvious abnormality, atraumatic  Lungs: clear to auscultation bilaterally  Heart: regular rate and rhythm, S1, S2 normal, no murmur, click, rub or gallop  Abdomen: soft, non-tender; bowel sounds normal; no masses,  no organomegaly  Skin: Skin color, texture, turgor normal  No rashes or lesions    Lab, Imaging and other studies:  I have personally reviewed pertinent lab results    , CBC:   Lab Results   Component Value Date    WBC 8 16 01/04/2022    HGB 11 3 (L) 01/04/2022    HCT 38 2 01/04/2022    MCV 64 (L) 01/04/2022     01/04/2022    MCH 19 0 (L) 01/04/2022    MCHC 29 6 (L) 01/04/2022    RDW 16 1 (H) 01/04/2022    MPV 8 6 (L) 01/04/2022   , CMP:   Lab Results   Component Value Date    SODIUM 136 01/04/2022    K 3 3 (L) 01/04/2022     01/04/2022    CO2 30 01/04/2022    BUN 7 01/04/2022    CREATININE 1 15 01/04/2022    CALCIUM 8 6 01/04/2022    EGFR 86 01/04/2022     VTE Pharmacologic Prophylaxis:  Discharge less than 24 hours  VTE Mechanical Prophylaxis: sequential compression device

## 2022-01-05 ENCOUNTER — TRANSITIONAL CARE MANAGEMENT (OUTPATIENT)
Dept: FAMILY MEDICINE CLINIC | Facility: CLINIC | Age: 27
End: 2022-01-05

## 2022-01-05 ENCOUNTER — TELEPHONE (OUTPATIENT)
Dept: FAMILY MEDICINE CLINIC | Facility: CLINIC | Age: 27
End: 2022-01-05

## 2022-01-05 LAB — O+P STL CONC: NORMAL

## 2022-01-05 NOTE — TELEPHONE ENCOUNTER
----- Message from Reyes Scott DO sent at 1/4/2022  4:47 PM EST -----  Thank you for allowing us to participate in the care of your patient, Jamie Hatfield, who was hospitalized from 1/2/2022 through 1/4/2022 with the admitting diagnosis of worsening abdominal pain  In the ER he was noted to have leukocytosis of 29k and lactic acidosis prompting admission for sepsis  During hospitalization he was followed closely by general surgery given concerns of proctitis/enteritis  His diet was slowly advanced and on day of discharge he is tolerating solid food without difficulty  He is being discharged with ciprofloxacin/metronidazole to complete a seven day course of empiric antibiotics despite negative stool studies      If you have any additional questions or would like to discuss further, please feel free to contact me      DO Dawna Umaña  Internal Medicine, Hospitalist  [phone]

## 2022-01-06 NOTE — UTILIZATION REVIEW
Notification of Discharge   This is a Notification of Discharge from our facility [address]  Please be advised that this patient has been discharge from our facility  Below you will find the admission and discharge date and time including the patients disposition  UTILIZATION REVIEW CONTACT:  Lester Soares  Utilization   Network Utilization Review Department  Phone: [phone] x carefully listen to the prompts  All voicemails are confidential   Email: [email or web address]     PHYSICIAN ADVISORY SERVICES:  FOR [id number] REVIEW - MEDICAL NECESSITY DENIAL  Phone: [phone]  Fax: [phone]  Email: [email or web address]     PRESENTATION DATE: 1/2/2022  6:25 AM  OBERVATION ADMISSION DATE:   INPATIENT ADMISSION DATE: 1/2/22  7:32 AM   DISCHARGE DATE: 1/4/2022  2:38 PM  DISPOSITION: Home/Self Care Home/Self Care      IMPORTANT INFORMATION:  Send all requests for admission clinical reviews, approved or denied determinations and any other requests to dedicated fax number below belonging to the campus where the patient is receiving treatment   List of dedicated fax numbers:  [address] [place] [address] DENIALS (Administrative/Medical Necessity) [phone]   [address] [place] [address] (Maternity/NICU/Pediatrics) [phone]   [facility] [phone]   [address] [phone]   [address] [place] [address] [phone]   [facility] [address] [facility] [phone]   [facility]  [phone]   [address]  [address] [facility] [address] [facility] [phone]

## 2022-01-07 LAB
BACTERIA BLD CULT: NORMAL
BACTERIA BLD CULT: NORMAL

## 2022-02-21 ENCOUNTER — APPOINTMENT (EMERGENCY)
Dept: RADIOLOGY | Facility: HOSPITAL | Age: 27
End: 2022-02-21
Payer: COMMERCIAL

## 2022-02-21 ENCOUNTER — HOSPITAL ENCOUNTER (EMERGENCY)
Facility: HOSPITAL | Age: 27
Discharge: HOME | End: 2022-02-21
Attending: EMERGENCY MEDICINE | Admitting: EMERGENCY MEDICINE
Payer: COMMERCIAL

## 2022-02-21 VITALS
RESPIRATION RATE: 16 BRPM | HEIGHT: 69 IN | DIASTOLIC BLOOD PRESSURE: 85 MMHG | TEMPERATURE: 97.9 F | HEART RATE: 92 BPM | OXYGEN SATURATION: 99 % | BODY MASS INDEX: 26.66 KG/M2 | SYSTOLIC BLOOD PRESSURE: 151 MMHG | WEIGHT: 180 LBS

## 2022-02-21 DIAGNOSIS — Z87.19 HISTORY OF CROHN'S DISEASE: ICD-10-CM

## 2022-02-21 DIAGNOSIS — R11.2 NAUSEA AND VOMITING: ICD-10-CM

## 2022-02-21 DIAGNOSIS — R10.9 ABDOMINAL PAIN: Primary | ICD-10-CM

## 2022-02-21 LAB
ALBUMIN SERPL BCP-MCNC: 3.8 G/DL (ref 3.5–5)
ALP SERPL-CCNC: 102 U/L (ref 46–116)
ALT SERPL W P-5'-P-CCNC: 42 U/L (ref 12–78)
ANION GAP SERPL CALCULATED.3IONS-SCNC: 10 MMOL/L (ref 4–13)
AST SERPL W P-5'-P-CCNC: 44 U/L (ref 5–45)
BASOPHILS # BLD AUTO: 0.09 THOUSANDS/ΜL (ref 0–0.1)
BASOPHILS NFR BLD AUTO: 1 % (ref 0–1)
BILIRUB SERPL-MCNC: 0.69 MG/DL (ref 0.2–1)
BUN SERPL-MCNC: 11 MG/DL (ref 5–25)
CALCIUM SERPL-MCNC: 9.4 MG/DL (ref 8.3–10.1)
CHLORIDE SERPL-SCNC: 100 MMOL/L (ref 100–108)
CO2 SERPL-SCNC: 28 MMOL/L (ref 21–32)
CREAT SERPL-MCNC: 1.14 MG/DL (ref 0.6–1.3)
CRP SERPL QL: 16.2 MG/L
EOSINOPHIL # BLD AUTO: 0.17 THOUSAND/ΜL (ref 0–0.61)
EOSINOPHIL NFR BLD AUTO: 2 % (ref 0–6)
ERYTHROCYTE [DISTWIDTH] IN BLOOD BY AUTOMATED COUNT: 19.1 % (ref 11.6–15.1)
ERYTHROCYTE [SEDIMENTATION RATE] IN BLOOD: 17 MM/HOUR (ref 0–14)
GFR SERPL CREATININE-BSD FRML MDRD: 87 ML/MIN/1.73SQ M
GLUCOSE SERPL-MCNC: 88 MG/DL (ref 65–140)
HCT VFR BLD AUTO: 41.6 % (ref 36.5–49.3)
HGB BLD-MCNC: 12.3 G/DL (ref 12–17)
IMM GRANULOCYTES # BLD AUTO: 0.04 THOUSAND/UL (ref 0–0.2)
IMM GRANULOCYTES NFR BLD AUTO: 0 % (ref 0–2)
LYMPHOCYTES # BLD AUTO: 2.12 THOUSANDS/ΜL (ref 0.6–4.47)
LYMPHOCYTES NFR BLD AUTO: 19 % (ref 14–44)
MCH RBC QN AUTO: 18.8 PG (ref 26.8–34.3)
MCHC RBC AUTO-ENTMCNC: 29.6 G/DL (ref 31.4–37.4)
MCV RBC AUTO: 64 FL (ref 82–98)
MONOCYTES # BLD AUTO: 1.29 THOUSAND/ΜL (ref 0.17–1.22)
MONOCYTES NFR BLD AUTO: 12 % (ref 4–12)
NEUTROPHILS # BLD AUTO: 7.36 THOUSANDS/ΜL (ref 1.85–7.62)
NEUTS SEG NFR BLD AUTO: 66 % (ref 43–75)
NRBC BLD AUTO-RTO: 0 /100 WBCS
PLATELET # BLD AUTO: 508 THOUSANDS/UL (ref 149–390)
PMV BLD AUTO: 8.9 FL (ref 8.9–12.7)
POTASSIUM SERPL-SCNC: 4.3 MMOL/L (ref 3.5–5.3)
PROT SERPL-MCNC: 7.9 G/DL (ref 6.4–8.2)
RBC # BLD AUTO: 6.54 MILLION/UL (ref 3.88–5.62)
SODIUM SERPL-SCNC: 138 MMOL/L (ref 136–145)
WBC # BLD AUTO: 11.07 THOUSAND/UL (ref 4.31–10.16)

## 2022-02-21 PROCEDURE — 96375 TX/PRO/DX INJ NEW DRUG ADDON: CPT

## 2022-02-21 PROCEDURE — G1004 CDSM NDSC: HCPCS

## 2022-02-21 PROCEDURE — 80053 COMPREHEN METABOLIC PANEL: CPT | Performed by: EMERGENCY MEDICINE

## 2022-02-21 PROCEDURE — 85652 RBC SED RATE AUTOMATED: CPT | Performed by: EMERGENCY MEDICINE

## 2022-02-21 PROCEDURE — 99284 EMERGENCY DEPT VISIT MOD MDM: CPT

## 2022-02-21 PROCEDURE — 99285 EMERGENCY DEPT VISIT HI MDM: CPT | Performed by: EMERGENCY MEDICINE

## 2022-02-21 PROCEDURE — 86140 C-REACTIVE PROTEIN: CPT | Performed by: EMERGENCY MEDICINE

## 2022-02-21 PROCEDURE — 85025 COMPLETE CBC W/AUTO DIFF WBC: CPT | Performed by: EMERGENCY MEDICINE

## 2022-02-21 PROCEDURE — 96376 TX/PRO/DX INJ SAME DRUG ADON: CPT

## 2022-02-21 PROCEDURE — 96374 THER/PROPH/DIAG INJ IV PUSH: CPT

## 2022-02-21 PROCEDURE — 36415 COLL VENOUS BLD VENIPUNCTURE: CPT | Performed by: EMERGENCY MEDICINE

## 2022-02-21 PROCEDURE — 74177 CT ABD & PELVIS W/CONTRAST: CPT

## 2022-02-21 RX ORDER — HYDROMORPHONE HCL/PF 1 MG/ML
1 SYRINGE (ML) INJECTION ONCE
Status: COMPLETED | OUTPATIENT
Start: 2022-02-21 | End: 2022-02-21

## 2022-02-21 RX ORDER — ONDANSETRON 4 MG/1
4 TABLET, ORALLY DISINTEGRATING ORAL EVERY 6 HOURS PRN
Qty: 20 TABLET | Refills: 0 | Status: SHIPPED | OUTPATIENT
Start: 2022-02-21 | End: 2022-06-01

## 2022-02-21 RX ORDER — ONDANSETRON 2 MG/ML
4 INJECTION INTRAMUSCULAR; INTRAVENOUS ONCE
Status: COMPLETED | OUTPATIENT
Start: 2022-02-21 | End: 2022-02-21

## 2022-02-21 RX ORDER — KETOROLAC TROMETHAMINE 30 MG/ML
15 INJECTION, SOLUTION INTRAMUSCULAR; INTRAVENOUS ONCE
Status: COMPLETED | OUTPATIENT
Start: 2022-02-21 | End: 2022-02-21

## 2022-02-21 RX ADMIN — HYDROMORPHONE HYDROCHLORIDE 1 MG: 1 INJECTION, SOLUTION INTRAMUSCULAR; INTRAVENOUS; SUBCUTANEOUS at 13:47

## 2022-02-21 RX ADMIN — ONDANSETRON 4 MG: 2 INJECTION INTRAMUSCULAR; INTRAVENOUS at 13:04

## 2022-02-21 RX ADMIN — HYDROMORPHONE HYDROCHLORIDE 1 MG: 1 INJECTION, SOLUTION INTRAMUSCULAR; INTRAVENOUS; SUBCUTANEOUS at 13:04

## 2022-02-21 RX ADMIN — IOHEXOL 100 ML: 350 INJECTION, SOLUTION INTRAVENOUS at 13:00

## 2022-02-21 RX ADMIN — KETOROLAC TROMETHAMINE 15 MG: 30 INJECTION, SOLUTION INTRAMUSCULAR at 12:08

## 2022-02-21 NOTE — Clinical Note
Selwyn Steeleyanelis was seen and treated in our emergency department on 2/21/2022  Diagnosis:     Jessica Kati    He may return on this date: If you have any questions or concerns, please don't hesitate to call        Honorio Adams MD    ______________________________           _______________          _______________  Hospital Representative                              Date                                Time

## 2022-02-21 NOTE — DISCHARGE INSTRUCTIONS
Continue with the prescribed Zofran as needed for nausea, continue with home pain regiment  If pain worsens, if you have persistent severe nausea despite medications, worsening abdominal distension, return to the emergency department for probable bowel obstruction    Your CT scan and lab work today were all grossly normal

## 2022-02-21 NOTE — ED PROVIDER NOTES
History  Chief Complaint   Patient presents with    Abdominal Pain     PT c/o LLQ abd pain that radiates into his pubis and infuinal area, constipation, nausea, and vomiting X1 PTA  hx of chrones  HPI  Patient is a 78-year-old male history of Crohn's disease with multiple prior small-bowel obstructions, prior bowel resection, ankylosing spondylitis, C diff colitis presenting for evaluation generalized lower abdominal pain most prominent in the left lower quadrant  Patient states that the symptoms started around 4 in the morning, relatively sudden onset, persistent since that time, with associated nausea but no vomiting  Patient states that his abdomen is felt distended, states constipation for the last day and a and has not been passing flatus  Patient states that the pain is severe  Patient is concerned that it feels similar to prior small-bowel obstructions  Patient denies fevers, chills, fatigue, constitutional symptoms  Prior to yesterday patient was at baseline health  Prior to Admission Medications   Prescriptions Last Dose Informant Patient Reported? Taking?    DULoxetine (CYMBALTA) 30 mg delayed release capsule   Yes No   Sig: daily   Tofacitinib Citrate ER (Xeljanz XR) 11 MG TB24   Yes No   Sig: Take by mouth daily   ondansetron (ZOFRAN) 4 mg tablet   No No   Sig: Take 1 tablet (4 mg total) by mouth every 8 (eight) hours as needed for nausea or vomiting   oxyCODONE (Roxicodone) 5 immediate release tablet   No No   Sig: Take 1 tablet (5 mg total) by mouth every 6 (six) hours as needed for severe pain Max Daily Amount: 20 mg   psyllium (METAMUCIL) 58 6 % powder   Yes No   Sig: Take 1 packet by mouth 3 (three) times a day      Facility-Administered Medications: None       Past Medical History:   Diagnosis Date    [facility]ylosing spondylitis ([facility] )     Anxiety     Bowel obstruction (HCC)     Clostridium difficile colitis 9/13/2018    Colitis     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 ) 9/13/2018    Pancreatitis     Ulcerative colitis ([facility] )        Past Surgical History:   Procedure Laterality Date    COLECTOMY TOTAL      with ileal pouch and anastemosis    TOTAL COLECTOMY         History reviewed  No pertinent family history  I have reviewed and agree with the history as documented  E-Cigarette/Vaping    E-Cigarette Use Never User      E-Cigarette/Vaping Substances    Nicotine No     THC No     CBD No     Flavoring No     Other No     Unknown No      Social History     Tobacco Use    Smoking status: Never Smoker    Smokeless tobacco: Never Used   Vaping Use    Vaping Use: Never used   Substance Use Topics    Alcohol use: Yes     Comment: pt states 5 a month    Drug use: No       Review of Systems   Constitutional: Negative for chills, fatigue and fever  HENT: Negative for congestion, rhinorrhea and sore throat  Eyes: Negative for photophobia and visual disturbance  Respiratory: Negative for chest tightness and shortness of breath  Cardiovascular: Negative for chest pain, palpitations and leg swelling  Gastrointestinal: Positive for abdominal distention, abdominal pain and nausea  Negative for diarrhea and vomiting  Endocrine: Negative for polydipsia and polyuria  Genitourinary: Negative for dysuria and hematuria  Musculoskeletal: Negative for arthralgias and myalgias  Skin: Negative for color change, pallor, rash and wound  Neurological: Negative for weakness, numbness and headaches  Psychiatric/Behavioral: Negative for confusion  Physical Exam  Physical Exam  Vitals and nursing note reviewed  Constitutional:       General: He is not in acute distress  Appearance: He is well-developed  He is not diaphoretic  Comments: Well-appearing, nondistressed   HENT:      Head: Normocephalic and atraumatic        Comments: Moist mucous membranes     Right Ear: External ear normal       Left Ear: External ear normal       Nose: Nose normal       Mouth/Throat:      Pharynx: No oropharyngeal exudate  Eyes:      Conjunctiva/sclera: Conjunctivae normal       Pupils: Pupils are equal, round, and reactive to light  Cardiovascular:      Rate and Rhythm: Normal rate and regular rhythm  Heart sounds: Normal heart sounds  No murmur heard  No friction rub  No gallop  Pulmonary:      Effort: Pulmonary effort is normal  No respiratory distress  Breath sounds: Normal breath sounds  No wheezing  Chest:      Chest wall: No tenderness  Abdominal:      General: Bowel sounds are normal  There is no distension  Palpations: Abdomen is soft  There is no mass  Tenderness: There is abdominal tenderness  There is no guarding or rebound  Comments: Generalized abdominal tenderness most pronounced in the left lower quadrant  Abdomen mildly distended  Normal bowel sounds  Not tympanitic  Musculoskeletal:         General: No deformity  Skin:     General: Skin is warm and dry  Capillary Refill: Capillary refill takes less than 2 seconds  Neurological:      Mental Status: He is alert and oriented to person, place, and time     Psychiatric:         Behavior: Behavior normal          Vital Signs  ED Triage Vitals [02/21/22 1147]   Temperature Pulse Respirations Blood Pressure SpO2   97 9 °F (36 6 °C) 92 16 151/85 99 %      Temp Source Heart Rate Source Patient Position - Orthostatic VS BP Location FiO2 (%)   Oral Monitor Sitting Right arm --      Pain Score       10 - Worst Possible Pain           Vitals:    02/21/22 1147   BP: 151/85   Pulse: 92   Patient Position - Orthostatic VS: Sitting         Visual Acuity      ED Medications  Medications   ketorolac (TORADOL) injection 15 mg (15 mg Intravenous Given 2/21/22 1208)   HYDROmorphone (DILAUDID) injection 1 mg (1 mg Intravenous Given 2/21/22 1304)   ondansetron (ZOFRAN) injection 4 mg (4 mg Intravenous Given 2/21/22 1304)   iohexol (OMNIPAQUE) 350 MG/ML injection (SINGLE-DOSE) 100 mL (100 mL Intravenous Given 2/21/22 1300)   HYDROmorphone (DILAUDID) injection 1 mg (1 mg Intravenous Given 2/21/22 1347)       Diagnostic Studies  Results Reviewed     Procedure Component Value Units Date/Time    Comprehensive metabolic panel [[id number]] Collected: 02/21/22 1208    Lab Status: Final result Specimen: Blood from Arm, Left Updated: 02/21/22 1239     Sodium 138 mmol/L      Potassium 4 3 mmol/L      Chloride 100 mmol/L      CO2 28 mmol/L      ANION GAP 10 mmol/L      BUN 11 mg/dL      Creatinine 1 14 mg/dL      Glucose 88 mg/dL      Calcium 9 4 mg/dL      AST 44 U/L      ALT 42 U/L      Alkaline Phosphatase 102 U/L      Total Protein 7 9 g/dL      Albumin 3 8 g/dL      Total Bilirubin 0 69 mg/dL      eGFR 87 ml/min/1 73sq m     Narrative:      [place] guidelines for Chronic Kidney Disease (CKD):     Stage 1 with normal or high GFR (GFR > 90 mL/min/1 73 square meters)    Stage 2 Mild CKD (GFR = 60-89 mL/min/1 73 square meters)    Stage 3A Moderate CKD (GFR = 45-59 mL/min/1 73 square meters)    Stage 3B Moderate CKD (GFR = 30-44 mL/min/1 73 square meters)    Stage 4 Severe CKD (GFR = 15-29 mL/min/1 73 square meters)    Stage 5 End Stage CKD (GFR <15 mL/min/1 73 square meters)  Note: GFR calculation is accurate only with a steady state creatinine    C-reactive protein [[id number]]  (Abnormal) Collected: 02/21/22 1208    Lab Status: Final result Specimen: Blood from Arm, Left Updated: 02/21/22 1239     CRP 16 2 mg/L     Sedimentation rate, automated [[id number]]  (Abnormal) Collected: 02/21/22 1208    Lab Status: Final result Specimen: Blood from Arm, Left Updated: 02/21/22 1229     Sed Rate 17 mm/hour     CBC and differential [[id number]]  (Abnormal) Collected: 02/21/22 1208    Lab Status: Final result Specimen: Blood from Arm, Left Updated: 02/21/22 1216     WBC 11 07 Thousand/uL      RBC 6 54 Million/uL      Hemoglobin 12 3 g/dL      Hematocrit 41 6 %      MCV 64 fL      MCH 18 8 pg      MCHC 29 6 g/dL RDW 19 1 %      MPV 8 9 fL      Platelets 711 Thousands/uL      nRBC 0 /100 WBCs      Neutrophils Relative 66 %      Immat GRANS % 0 %      Lymphocytes Relative 19 %      Monocytes Relative 12 %      Eosinophils Relative 2 %      Basophils Relative 1 %      Neutrophils Absolute 7 36 Thousands/µL      Immature Grans Absolute 0 04 Thousand/uL      Lymphocytes Absolute 2 12 Thousands/µL      Monocytes Absolute 1 29 Thousand/µL      Eosinophils Absolute 0 17 Thousand/µL      Basophils Absolute 0 09 Thousands/µL     Urinalysis with microscopic [[id number]]     Lab Status: No result Specimen: Urine                  CT abdomen pelvis w contrast   Final Result by Violet Anne DO (02/21 1336)   Patient is status post colectomy and ileal pouch-anal anastomosis  Similar appearance compared from prior exam with persistent mild small bowel wall thickening involving the distal small bowel creating the pouch, but with less pronounced mucosal enhancement  This likely represents chronic inflammatory enteritis  No    obstruction or perforation  Trace amounts of pelvic fluid is seen in the right obturator location  Persistent aortocaval and right external iliac adenopathy, which have demonstrated long-term stability  Workstation performed: [id number]                    Procedures  Procedures         ED Course                                             MDM  Number of Diagnoses or Management Options  Abdominal pain  History of Crohn's disease  Nausea and vomiting  Diagnosis management comments: Plan for CBC, CMP, inflammatory markers, CT abdomen pelvis, symptomatic pain management with Toradol, Dilaudid, Zofran, IV fluids  Patient requiring 2nd administration of Dilaudid ultimately with some relief of symptoms  Significant improvement of nausea following Zofran  CT abdomen pelvis without evidence of small-bowel obstruction or additional acute intra-abdominal abnormality    Patient noted to have interval improvement of bowel wall thickening on scan  Non elevated inflammatory markers without leukocytosis  Patient instructed to continue symptomatic management at home, discharged with verbal and written return precautions  Disposition  Final diagnoses:   Abdominal pain   Nausea and vomiting   History of Crohn's disease     Time reflects when diagnosis was documented in both MDM as applicable and the Disposition within this note     Time User Action Codes Description Comment    2/21/2022  2:00 PM Damion Early [R10 9] Abdominal pain     2/21/2022  2:00 PM Damion Early [R11 2] Nausea and vomiting     2/21/2022  2:00 PM Damion Early [E84 97] History of Crohn's disease       ED Disposition     ED Disposition Condition Date/Time Comment    Discharge Stable Mon Feb 21, 2022  2:00 PM Dearsusan Velázquez discharge to home/self care              Follow-up Information     Follow up With Specialties Details Why Contact Info Additional Information    [address] [facility] Emergency Medicine  If symptoms worsen [address] [facility] [address]  [address] [address] Emergency Department, [place], [place], [address]          Discharge Medication List as of 2/21/2022  3:22 PM      START taking these medications    Details   ondansetron (Zofran ODT) 4 mg disintegrating tablet Take 1 tablet (4 mg total) by mouth every 6 (six) hours as needed for nausea or vomiting, Starting Mon 2/21/2022, Normal         CONTINUE these medications which have NOT CHANGED    Details   DULoxetine (CYMBALTA) 30 mg delayed release capsule daily, Starting Tue 10/26/2021, Historical Med      ondansetron (ZOFRAN) 4 mg tablet Take 1 tablet (4 mg total) by mouth every 8 (eight) hours as needed for nausea or vomiting, Starting Tue 1/4/2022, Normal      oxyCODONE (Roxicodone) 5 immediate release tablet Take 1 tablet (5 mg total) by mouth every 6 (six) hours as needed for severe pain Max Daily Amount: 20 mg, Starting Tue 1/4/2022, Normal      psyllium (METAMUCIL) 58 6 % powder Take 1 packet by mouth 3 (three) times a day, Historical Med      Tofacitinib Citrate ER (Xeljanz XR) 11 MG TB24 Take by mouth daily, Historical Med             No discharge procedures on file      PDMP Review     None          ED Provider  Electronically Signed by           Nereida Brooke MD  02/21/22 9396

## 2022-02-23 ENCOUNTER — APPOINTMENT (INPATIENT)
Dept: RADIOLOGY | Facility: HOSPITAL | Age: 27
DRG: 394 | End: 2022-02-23
Payer: COMMERCIAL

## 2022-02-23 ENCOUNTER — HOSPITAL ENCOUNTER (INPATIENT)
Facility: HOSPITAL | Age: 27
LOS: 8 days | Discharge: HOME | DRG: 394 | End: 2022-03-03
Attending: EMERGENCY MEDICINE | Admitting: STUDENT IN AN ORGANIZED HEALTH CARE EDUCATION/TRAINING PROGRAM
Payer: COMMERCIAL

## 2022-02-23 DIAGNOSIS — R10.9 ABDOMINAL PAIN: Primary | ICD-10-CM

## 2022-02-23 DIAGNOSIS — R78.81 POSITIVE BLOOD CULTURES: ICD-10-CM

## 2022-02-23 DIAGNOSIS — A41.9 SEPSIS (HCC): ICD-10-CM

## 2022-02-23 DIAGNOSIS — K51.90 ULCERATIVE COLITIS (HCC): ICD-10-CM

## 2022-02-23 DIAGNOSIS — R19.7 BLOODY DIARRHEA: ICD-10-CM

## 2022-02-23 DIAGNOSIS — K51.00: ICD-10-CM

## 2022-02-23 DIAGNOSIS — K56.699 STRICTURE INTESTINAL (HCC): ICD-10-CM

## 2022-02-23 LAB
ALBUMIN SERPL BCP-MCNC: 4.1 G/DL (ref 3.5–5)
ALP SERPL-CCNC: 104 U/L (ref 46–116)
ALT SERPL W P-5'-P-CCNC: 44 U/L (ref 12–78)
ANION GAP SERPL CALCULATED.3IONS-SCNC: 11 MMOL/L (ref 4–13)
AST SERPL W P-5'-P-CCNC: 27 U/L (ref 5–45)
BACTERIA UR QL AUTO: ABNORMAL /HPF
BASOPHILS # BLD AUTO: 0.06 THOUSANDS/ΜL (ref 0–0.1)
BASOPHILS NFR BLD AUTO: 0 % (ref 0–1)
BILIRUB SERPL-MCNC: 0.76 MG/DL (ref 0.2–1)
BILIRUB UR QL STRIP: ABNORMAL
BUN SERPL-MCNC: 13 MG/DL (ref 5–25)
CALCIUM SERPL-MCNC: 8.7 MG/DL (ref 8.3–10.1)
CHLORIDE SERPL-SCNC: 97 MMOL/L (ref 100–108)
CLARITY UR: CLEAR
CO2 SERPL-SCNC: 26 MMOL/L (ref 21–32)
COLOR UR: YELLOW
CREAT SERPL-MCNC: 1.34 MG/DL (ref 0.6–1.3)
CRP SERPL QL: 16 MG/L
EOSINOPHIL # BLD AUTO: 0.16 THOUSAND/ΜL (ref 0–0.61)
EOSINOPHIL NFR BLD AUTO: 1 % (ref 0–6)
ERYTHROCYTE [DISTWIDTH] IN BLOOD BY AUTOMATED COUNT: 19.6 % (ref 11.6–15.1)
GFR SERPL CREATININE-BSD FRML MDRD: 71 ML/MIN/1.73SQ M
GLUCOSE SERPL-MCNC: 96 MG/DL (ref 65–140)
GLUCOSE UR STRIP-MCNC: NEGATIVE MG/DL
HCT VFR BLD AUTO: 44.3 % (ref 36.5–49.3)
HGB BLD-MCNC: 13.1 G/DL (ref 12–17)
HGB UR QL STRIP.AUTO: ABNORMAL
IMM GRANULOCYTES # BLD AUTO: 0.08 THOUSAND/UL (ref 0–0.2)
IMM GRANULOCYTES NFR BLD AUTO: 1 % (ref 0–2)
KETONES UR STRIP-MCNC: ABNORMAL MG/DL
LACTATE SERPL-SCNC: 1.7 MMOL/L (ref 0.5–2)
LEUKOCYTE ESTERASE UR QL STRIP: NEGATIVE
LIPASE SERPL-CCNC: 116 U/L (ref 73–393)
LYMPHOCYTES # BLD AUTO: 0.84 THOUSANDS/ΜL (ref 0.6–4.47)
LYMPHOCYTES NFR BLD AUTO: 5 % (ref 14–44)
MCH RBC QN AUTO: 18.8 PG (ref 26.8–34.3)
MCHC RBC AUTO-ENTMCNC: 29.6 G/DL (ref 31.4–37.4)
MCV RBC AUTO: 64 FL (ref 82–98)
MONOCYTES # BLD AUTO: 0.9 THOUSAND/ΜL (ref 0.17–1.22)
MONOCYTES NFR BLD AUTO: 6 % (ref 4–12)
MUCOUS THREADS UR QL AUTO: ABNORMAL
NEUTROPHILS # BLD AUTO: 13.91 THOUSANDS/ΜL (ref 1.85–7.62)
NEUTS SEG NFR BLD AUTO: 87 % (ref 43–75)
NITRITE UR QL STRIP: NEGATIVE
NON-SQ EPI CELLS URNS QL MICRO: ABNORMAL /HPF
NRBC BLD AUTO-RTO: 0 /100 WBCS
PH UR STRIP.AUTO: 6 [PH]
PLATELET # BLD AUTO: 479 THOUSANDS/UL (ref 149–390)
PMV BLD AUTO: 8.6 FL (ref 8.9–12.7)
POTASSIUM SERPL-SCNC: 3.9 MMOL/L (ref 3.5–5.3)
PROT SERPL-MCNC: 8.1 G/DL (ref 6.4–8.2)
PROT UR STRIP-MCNC: NEGATIVE MG/DL
RBC # BLD AUTO: 6.95 MILLION/UL (ref 3.88–5.62)
RBC #/AREA URNS AUTO: ABNORMAL /HPF
SODIUM SERPL-SCNC: 134 MMOL/L (ref 136–145)
SP GR UR STRIP.AUTO: 1.02 (ref 1–1.03)
UROBILINOGEN UR QL STRIP.AUTO: 0.2 E.U./DL
WBC # BLD AUTO: 15.95 THOUSAND/UL (ref 4.31–10.16)
WBC #/AREA URNS AUTO: ABNORMAL /HPF

## 2022-02-23 PROCEDURE — 99285 EMERGENCY DEPT VISIT HI MDM: CPT | Performed by: EMERGENCY MEDICINE

## 2022-02-23 PROCEDURE — 87493 C DIFF AMPLIFIED PROBE: CPT | Performed by: EMERGENCY MEDICINE

## 2022-02-23 PROCEDURE — 80053 COMPREHEN METABOLIC PANEL: CPT | Performed by: EMERGENCY MEDICINE

## 2022-02-23 PROCEDURE — 36415 COLL VENOUS BLD VENIPUNCTURE: CPT | Performed by: EMERGENCY MEDICINE

## 2022-02-23 PROCEDURE — 87040 BLOOD CULTURE FOR BACTERIA: CPT | Performed by: EMERGENCY MEDICINE

## 2022-02-23 PROCEDURE — 96361 HYDRATE IV INFUSION ADD-ON: CPT

## 2022-02-23 PROCEDURE — 83605 ASSAY OF LACTIC ACID: CPT | Performed by: EMERGENCY MEDICINE

## 2022-02-23 PROCEDURE — 87186 SC STD MICRODIL/AGAR DIL: CPT | Performed by: EMERGENCY MEDICINE

## 2022-02-23 PROCEDURE — 86140 C-REACTIVE PROTEIN: CPT | Performed by: EMERGENCY MEDICINE

## 2022-02-23 PROCEDURE — 87077 CULTURE AEROBIC IDENTIFY: CPT | Performed by: EMERGENCY MEDICINE

## 2022-02-23 PROCEDURE — 81001 URINALYSIS AUTO W/SCOPE: CPT | Performed by: EMERGENCY MEDICINE

## 2022-02-23 PROCEDURE — 85025 COMPLETE CBC W/AUTO DIFF WBC: CPT | Performed by: EMERGENCY MEDICINE

## 2022-02-23 PROCEDURE — G1004 CDSM NDSC: HCPCS

## 2022-02-23 PROCEDURE — 83690 ASSAY OF LIPASE: CPT | Performed by: EMERGENCY MEDICINE

## 2022-02-23 PROCEDURE — 96376 TX/PRO/DX INJ SAME DRUG ADON: CPT

## 2022-02-23 PROCEDURE — 99285 EMERGENCY DEPT VISIT HI MDM: CPT

## 2022-02-23 PROCEDURE — 74177 CT ABD & PELVIS W/CONTRAST: CPT

## 2022-02-23 PROCEDURE — 96375 TX/PRO/DX INJ NEW DRUG ADDON: CPT

## 2022-02-23 PROCEDURE — 96374 THER/PROPH/DIAG INJ IV PUSH: CPT

## 2022-02-23 RX ORDER — PROMETHAZINE HYDROCHLORIDE 25 MG/ML
25 INJECTION, SOLUTION INTRAMUSCULAR; INTRAVENOUS EVERY 6 HOURS PRN
Status: DISCONTINUED | OUTPATIENT
Start: 2022-02-23 | End: 2022-03-03 | Stop reason: HOSPADM

## 2022-02-23 RX ORDER — HYDROMORPHONE HCL/PF 1 MG/ML
0.5 SYRINGE (ML) INJECTION ONCE
Status: COMPLETED | OUTPATIENT
Start: 2022-02-23 | End: 2022-02-23

## 2022-02-23 RX ORDER — ONDANSETRON 2 MG/ML
4 INJECTION INTRAMUSCULAR; INTRAVENOUS ONCE
Status: COMPLETED | OUTPATIENT
Start: 2022-02-23 | End: 2022-02-23

## 2022-02-23 RX ORDER — HYDROMORPHONE HCL/PF 1 MG/ML
1 SYRINGE (ML) INJECTION ONCE
Status: COMPLETED | OUTPATIENT
Start: 2022-02-23 | End: 2022-02-23

## 2022-02-23 RX ORDER — DIAZEPAM 5 MG/ML
5 INJECTION, SOLUTION INTRAMUSCULAR; INTRAVENOUS ONCE
Status: COMPLETED | OUTPATIENT
Start: 2022-02-23 | End: 2022-02-23

## 2022-02-23 RX ORDER — PROMETHAZINE HYDROCHLORIDE 25 MG/ML
25 INJECTION, SOLUTION INTRAMUSCULAR; INTRAVENOUS EVERY 6 HOURS PRN
Status: DISCONTINUED | OUTPATIENT
Start: 2022-02-23 | End: 2022-02-23

## 2022-02-23 RX ADMIN — ONDANSETRON 4 MG: 2 INJECTION INTRAMUSCULAR; INTRAVENOUS at 20:19

## 2022-02-23 RX ADMIN — ONDANSETRON 4 MG: 2 INJECTION INTRAMUSCULAR; INTRAVENOUS at 20:42

## 2022-02-23 RX ADMIN — SODIUM CHLORIDE 1000 ML: 0.9 INJECTION, SOLUTION INTRAVENOUS at 23:41

## 2022-02-23 RX ADMIN — PROMETHAZINE HYDROCHLORIDE 25 MG: 25 INJECTION INTRAMUSCULAR; INTRAVENOUS at 23:02

## 2022-02-23 RX ADMIN — SODIUM CHLORIDE 1000 ML: 9 INJECTION, SOLUTION INTRAVENOUS at 20:19

## 2022-02-23 RX ADMIN — HYDROMORPHONE HYDROCHLORIDE 1 MG: 1 INJECTION, SOLUTION INTRAMUSCULAR; INTRAVENOUS; SUBCUTANEOUS at 21:29

## 2022-02-23 RX ADMIN — HYDROMORPHONE HYDROCHLORIDE 1 MG: 1 INJECTION, SOLUTION INTRAMUSCULAR; INTRAVENOUS; SUBCUTANEOUS at 20:22

## 2022-02-23 RX ADMIN — HYDROMORPHONE HYDROCHLORIDE 0.5 MG: 1 INJECTION, SOLUTION INTRAMUSCULAR; INTRAVENOUS; SUBCUTANEOUS at 23:00

## 2022-02-23 RX ADMIN — DIAZEPAM 5 MG: 10 INJECTION, SOLUTION INTRAMUSCULAR; INTRAVENOUS at 22:23

## 2022-02-23 RX ADMIN — IOHEXOL 100 ML: 350 INJECTION, SOLUTION INTRAVENOUS at 23:35

## 2022-02-23 NOTE — LETTER
[address] [facility] [address]  [place] [address]  Dept: [phone]    March 3, 2022     Patient: Larry Howard   YOB: 1995   Date of Visit: 2/23/2022       To Whom it May Concern:    Loretta Perez is under my professional care  He was seen in the hospital from 2/23/2022   to 03/03/22  He may return to work on March 11th, 2022 without limitations  Patient made need extension of leave from work if deemed so upon evaluation with PMD after discharge  If you have any questions or concerns, please don't hesitate to call           Sincerely,     Jitendra May, DO

## 2022-02-24 PROBLEM — K52.9 ENTERITIS: Status: ACTIVE | Noted: 2022-02-24

## 2022-02-24 PROBLEM — R65.10 SIRS (SYSTEMIC INFLAMMATORY RESPONSE SYNDROME) (HCC): Status: ACTIVE | Noted: 2022-02-24

## 2022-02-24 LAB
ALBUMIN SERPL BCP-MCNC: 3.1 G/DL (ref 3.5–5)
ALP SERPL-CCNC: 76 U/L (ref 46–116)
ALT SERPL W P-5'-P-CCNC: 35 U/L (ref 12–78)
ANION GAP SERPL CALCULATED.3IONS-SCNC: 7 MMOL/L (ref 4–13)
AST SERPL W P-5'-P-CCNC: 39 U/L (ref 5–45)
BILIRUB SERPL-MCNC: 0.83 MG/DL (ref 0.2–1)
BILIRUB UR QL STRIP: NEGATIVE
BUN SERPL-MCNC: 11 MG/DL (ref 5–25)
C DIFF TOX GENS STL QL NAA+PROBE: NEGATIVE
CALCIUM ALBUM COR SERPL-MCNC: 8 MG/DL (ref 8.3–10.1)
CALCIUM SERPL-MCNC: 7.3 MG/DL (ref 8.3–10.1)
CHLORIDE SERPL-SCNC: 104 MMOL/L (ref 100–108)
CLARITY UR: CLEAR
CO2 SERPL-SCNC: 23 MMOL/L (ref 21–32)
COLOR UR: YELLOW
CREAT SERPL-MCNC: 1.25 MG/DL (ref 0.6–1.3)
ERYTHROCYTE [DISTWIDTH] IN BLOOD BY AUTOMATED COUNT: 18.7 % (ref 11.6–15.1)
GFR SERPL CREATININE-BSD FRML MDRD: 77 ML/MIN/1.73SQ M
GLUCOSE SERPL-MCNC: 88 MG/DL (ref 65–140)
GLUCOSE SERPL-MCNC: 89 MG/DL (ref 65–140)
GLUCOSE UR STRIP-MCNC: NEGATIVE MG/DL
HCT VFR BLD AUTO: 38.4 % (ref 36.5–49.3)
HGB BLD-MCNC: 11.4 G/DL (ref 12–17)
HGB UR QL STRIP.AUTO: NEGATIVE
KETONES UR STRIP-MCNC: NEGATIVE MG/DL
LEUKOCYTE ESTERASE UR QL STRIP: NEGATIVE
MAGNESIUM SERPL-MCNC: 1.6 MG/DL (ref 1.6–2.6)
MCH RBC QN AUTO: 19.2 PG (ref 26.8–34.3)
MCHC RBC AUTO-ENTMCNC: 29.7 G/DL (ref 31.4–37.4)
MCV RBC AUTO: 65 FL (ref 82–98)
NITRITE UR QL STRIP: NEGATIVE
PH UR STRIP.AUTO: 6 [PH]
PLATELET # BLD AUTO: 370 THOUSANDS/UL (ref 149–390)
PMV BLD AUTO: 8.7 FL (ref 8.9–12.7)
POTASSIUM SERPL-SCNC: 4.6 MMOL/L (ref 3.5–5.3)
PROT SERPL-MCNC: 6.5 G/DL (ref 6.4–8.2)
PROT UR STRIP-MCNC: NEGATIVE MG/DL
RBC # BLD AUTO: 5.93 MILLION/UL (ref 3.88–5.62)
SODIUM SERPL-SCNC: 134 MMOL/L (ref 136–145)
SP GR UR STRIP.AUTO: 1.01 (ref 1–1.03)
UROBILINOGEN UR QL STRIP.AUTO: 0.2 E.U./DL
WBC # BLD AUTO: 8.82 THOUSAND/UL (ref 4.31–10.16)

## 2022-02-24 PROCEDURE — 81003 URINALYSIS AUTO W/O SCOPE: CPT | Performed by: NURSE PRACTITIONER

## 2022-02-24 PROCEDURE — 82948 REAGENT STRIP/BLOOD GLUCOSE: CPT

## 2022-02-24 PROCEDURE — 85027 COMPLETE CBC AUTOMATED: CPT | Performed by: NURSE PRACTITIONER

## 2022-02-24 PROCEDURE — 99222 1ST HOSP IP/OBS MODERATE 55: CPT | Performed by: INTERNAL MEDICINE

## 2022-02-24 PROCEDURE — 80053 COMPREHEN METABOLIC PANEL: CPT | Performed by: NURSE PRACTITIONER

## 2022-02-24 PROCEDURE — 83735 ASSAY OF MAGNESIUM: CPT | Performed by: NURSE PRACTITIONER

## 2022-02-24 PROCEDURE — 36415 COLL VENOUS BLD VENIPUNCTURE: CPT | Performed by: NURSE PRACTITIONER

## 2022-02-24 RX ORDER — DULOXETIN HYDROCHLORIDE 30 MG/1
30 CAPSULE, DELAYED RELEASE ORAL DAILY
Status: DISCONTINUED | OUTPATIENT
Start: 2022-02-24 | End: 2022-03-03 | Stop reason: HOSPADM

## 2022-02-24 RX ORDER — CHLORPROMAZINE HYDROCHLORIDE 25 MG/1
25 TABLET, FILM COATED ORAL EVERY 6 HOURS PRN
Status: DISCONTINUED | OUTPATIENT
Start: 2022-02-24 | End: 2022-03-03 | Stop reason: HOSPADM

## 2022-02-24 RX ORDER — SODIUM CHLORIDE, SODIUM LACTATE, POTASSIUM CHLORIDE, CALCIUM CHLORIDE 600; 310; 30; 20 MG/100ML; MG/100ML; MG/100ML; MG/100ML
150 INJECTION, SOLUTION INTRAVENOUS CONTINUOUS
Status: DISCONTINUED | OUTPATIENT
Start: 2022-02-24 | End: 2022-02-25

## 2022-02-24 RX ORDER — HYDROMORPHONE HCL/PF 1 MG/ML
1 SYRINGE (ML) INJECTION
Status: DISCONTINUED | OUTPATIENT
Start: 2022-02-24 | End: 2022-03-03 | Stop reason: HOSPADM

## 2022-02-24 RX ORDER — HYDROMORPHONE HCL/PF 1 MG/ML
0.5 SYRINGE (ML) INJECTION ONCE
Status: COMPLETED | OUTPATIENT
Start: 2022-02-24 | End: 2022-02-24

## 2022-02-24 RX ORDER — DICYCLOMINE HYDROCHLORIDE 10 MG/1
10 CAPSULE ORAL EVERY 6 HOURS PRN
Status: DISCONTINUED | OUTPATIENT
Start: 2022-02-24 | End: 2022-03-03 | Stop reason: HOSPADM

## 2022-02-24 RX ORDER — ACETAMINOPHEN 650 MG/1
650 SUPPOSITORY RECTAL EVERY 4 HOURS PRN
Status: DISCONTINUED | OUTPATIENT
Start: 2022-02-24 | End: 2022-03-03 | Stop reason: HOSPADM

## 2022-02-24 RX ORDER — HYDROMORPHONE HCL/PF 1 MG/ML
0.5 SYRINGE (ML) INJECTION
Status: DISCONTINUED | OUTPATIENT
Start: 2022-02-24 | End: 2022-02-24

## 2022-02-24 RX ORDER — CIPROFLOXACIN 2 MG/ML
400 INJECTION, SOLUTION INTRAVENOUS EVERY 12 HOURS
Status: DISCONTINUED | OUTPATIENT
Start: 2022-02-24 | End: 2022-02-26

## 2022-02-24 RX ADMIN — DULOXETINE HYDROCHLORIDE 30 MG: 30 CAPSULE, DELAYED RELEASE ORAL at 08:57

## 2022-02-24 RX ADMIN — CIPROFLOXACIN 400 MG: 2 INJECTION, SOLUTION INTRAVENOUS at 21:52

## 2022-02-24 RX ADMIN — HYDROMORPHONE HYDROCHLORIDE 0.5 MG: 1 INJECTION, SOLUTION INTRAMUSCULAR; INTRAVENOUS; SUBCUTANEOUS at 06:34

## 2022-02-24 RX ADMIN — CIPROFLOXACIN 400 MG: 2 INJECTION, SOLUTION INTRAVENOUS at 11:05

## 2022-02-24 RX ADMIN — PROMETHAZINE HYDROCHLORIDE 25 MG: 25 INJECTION INTRAMUSCULAR; INTRAVENOUS at 20:34

## 2022-02-24 RX ADMIN — HYDROMORPHONE HYDROCHLORIDE 0.5 MG: 1 INJECTION, SOLUTION INTRAMUSCULAR; INTRAVENOUS; SUBCUTANEOUS at 02:24

## 2022-02-24 RX ADMIN — HYDROMORPHONE HYDROCHLORIDE 1 MG: 1 INJECTION, SOLUTION INTRAMUSCULAR; INTRAVENOUS; SUBCUTANEOUS at 15:26

## 2022-02-24 RX ADMIN — SODIUM CHLORIDE, SODIUM LACTATE, POTASSIUM CHLORIDE, AND CALCIUM CHLORIDE 125 ML/HR: .6; .31; .03; .02 INJECTION, SOLUTION INTRAVENOUS at 03:14

## 2022-02-24 RX ADMIN — HYDROMORPHONE HYDROCHLORIDE 0.5 MG: 1 INJECTION, SOLUTION INTRAMUSCULAR; INTRAVENOUS; SUBCUTANEOUS at 05:10

## 2022-02-24 RX ADMIN — PROMETHAZINE HYDROCHLORIDE 25 MG: 25 INJECTION INTRAMUSCULAR; INTRAVENOUS at 05:10

## 2022-02-24 RX ADMIN — METRONIDAZOLE 500 MG: 500 INJECTION, SOLUTION INTRAVENOUS at 11:05

## 2022-02-24 RX ADMIN — CHLORPROMAZINE HYDROCHLORIDE 25 MG: 25 TABLET, FILM COATED ORAL at 15:28

## 2022-02-24 RX ADMIN — ACETAMINOPHEN 650 MG: 650 SUPPOSITORY RECTAL at 00:04

## 2022-02-24 RX ADMIN — METRONIDAZOLE 500 MG: 500 INJECTION, SOLUTION INTRAVENOUS at 18:30

## 2022-02-24 RX ADMIN — HYDROMORPHONE HYDROCHLORIDE 1 MG: 1 INJECTION, SOLUTION INTRAMUSCULAR; INTRAVENOUS; SUBCUTANEOUS at 20:34

## 2022-02-24 RX ADMIN — SODIUM CHLORIDE, SODIUM LACTATE, POTASSIUM CHLORIDE, AND CALCIUM CHLORIDE 150 ML/HR: .6; .31; .03; .02 INJECTION, SOLUTION INTRAVENOUS at 23:09

## 2022-02-24 RX ADMIN — SODIUM CHLORIDE, SODIUM LACTATE, POTASSIUM CHLORIDE, AND CALCIUM CHLORIDE 125 ML/HR: .6; .31; .03; .02 INJECTION, SOLUTION INTRAVENOUS at 08:59

## 2022-02-24 RX ADMIN — HYDROMORPHONE HYDROCHLORIDE 0.5 MG: 1 INJECTION, SOLUTION INTRAMUSCULAR; INTRAVENOUS; SUBCUTANEOUS at 09:59

## 2022-02-24 RX ADMIN — PROMETHAZINE HYDROCHLORIDE 25 MG: 25 INJECTION INTRAMUSCULAR; INTRAVENOUS at 11:05

## 2022-02-24 NOTE — ASSESSMENT & PLAN NOTE
Patient has history of UC post colectomy and ileal pouch formation maintained on [place]  He developed abdominal pain two days ago with nausea and vomiting  CT with findings of enteritis at that time  Symptoms worsened today prompting him to return to the ER  He had 30 episodes of diarrhea which became bloody towards the ends  He reports chills, +fever in ER  CT of the A/P revealed no interval change, stable mild wall thickening of the distal ileum and pouch - may be nonspecific enteritis  Discussed with team at [facility] and they recommended no steroids, c  Diff testing and GI consult       Admit to medicine   NPO   Stool studies pending    IV hydration   Pain and nausea medications

## 2022-02-24 NOTE — ASSESSMENT & PLAN NOTE
As evidenced by fever, tachycardia, leukocytosis  Will hold off on ABT pending stool studies  CRP stable

## 2022-02-24 NOTE — CASE MANAGEMENT
Case Management Assessment & Discharge Planning Note    Patient name Michelle Woodland  Location [address] [place] [address] [place]-* MRN [id number]  : 1995 Date 2022       Current Admission Date: 2022  Current Admission Diagnosis:Enteritis   Patient Active Problem List    Diagnosis Date Noted    Enteritis 2022    SIRS (systemic inflammatory response syndrome) ([facility] ) 2022    Sepsis ([facility] ) 2022    Ankylosing spondylitis (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[facility] ) 2019    SARAHI (acute kidney injury) ([facility] ) 2019    History of ulcerative colitis 2019    Hyponatremia 2019    CAR (generalized anxiety disorder) 10/19/2018    BMI 28 0-28 9,adult 10/19/2018    Chronic ulcerative pancolitis ([facility] ) 2018    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[facility] ) 2018    Stricture of rectum 2018      LOS (days): 1  Geometric Mean LOS (GMLOS) (days):   Days to GMLOS:     OBJECTIVE:    Risk of Unplanned Readmission Score: 12         Current admission status: Inpatient       Preferred Pharmacy:   [address] [place] [address]  Phone: [phone] Fax: [phone]    Primary Care Provider: Pauline Barraza DO    Primary Insurance: BLUE CROSS  Secondary Insurance:     ASSESSMENT:  Active Health Care Agents    There are no active Health Care Agents on file           Patient Information  Admitted from[de-identified] Home  Mental Status: Alert  During Assessment patient was accompanied by: Not accompanied during assessment  Assessment information provided by[de-identified] Patient  Primary Caregiver: Self  Support Systems: [facility] of Residence: [address] do you live in?: [place]  Type of Current Residence: Other (Comment) (private residence)  In the last 12 months, was there a time when you were not able to pay the mortgage or rent on time?: No  In the last 12 months, how many places have you lived?: 1  In the last 12 months, was there a time when you did not have a steady place to sleep or slept in a shelter (including now)?: No  Homeless/housing insecurity resource given?: N/A  Living Arrangements: Lives w/ Parent(s)  Is patient a ?: No    Activities of Daily Living Prior to Admission  Functional Status: Independent  Completes ADLs independently?: Yes  Ambulates independently?: Yes  Does patient use assisted devices?: No  Does patient currently own DME?: No  Does patient have a history of Outpatient Therapy (PT/OT)?: No  Does the patient have a history of Short-Term Rehab?: No  Does patient have a history of HHC?: No  Does patient currently have [facility]?: No         Patient Information Continued  Income Source: Employed  Does patient have prescription coverage?: Yes  Within the past 12 months, you worried that your food would run out before you got the money to buy more : Never true  Within the past 12 months, the food you bought just didnt last and you didnt have money to get more : Never true  Food insecurity resource given?: N/A  Does patient receive dialysis treatments?: No  Does patient have a history of substance abuse?: No  Does patient have a history of Mental Health Diagnosis?: No         Means of Transportation  [facility] Drives Self  In the past 12 months, has lack of transportation kept you from medical appointments or from getting medications?: No  In the past 12 months, has lack of transportation kept you from meetings, work, or from getting things needed for daily living?: No  Was application for public transport provided?: N/A        DISCHARGE DETAILS:    Discharge planning discussed with[de-identified] patient  Freedom of Choice: Yes  Comments - Freedom of Choice: pt would like to go home after post acute stay and does not feel he will need any services  CM contacted family/caregiver?: No- see comments (pt will contact family)  Were Treatment Team discharge recommendations reviewed with patient/caregiver?: Yes  Did patient/caregiver verbalize understanding of patient care needs?: Yes  Were patient/caregiver advised of the risks associated with not following Treatment Team discharge recommendations?: Yes       Would you like to participate in our [address] service program?  : No - Declined

## 2022-02-24 NOTE — ED NOTES
Patient verbalizes understanding that urine sample is wanted by MD  States he is unable to go at this time   Urinal at bedside      [facility]  02/23/22 7811

## 2022-02-24 NOTE — H&P
Stephanie 47 1995, 27 y o  male MRN: [id number]  Unit/Bed#: ED 07 Encounter: [id number]  Primary Care Provider: Abiel Moreno DO   Date and time admitted to hospital: 2/23/2022  8:06 PM    SIRS (systemic inflammatory response syndrome) ([facility] [address] )  Assessment & Plan  As evidenced by fever, tachycardia, leukocytosis  Will hold off on ABT pending stool studies  CRP stable     CAR (generalized anxiety disorder)  Assessment & Plan  Continue cymbalta     * Enteritis  Assessment & Plan  Patient has history of UC post colectomy and ileal pouch formation maintained on [place]  He developed abdominal pain two days ago with nausea and vomiting  CT with findings of enteritis at that time  Symptoms worsened today prompting him to return to the ER  He had 30 episodes of diarrhea which became bloody towards the ends  He reports chills, +fever in ER  CT of the A/P revealed no interval change, stable mild wall thickening of the distal ileum and pouch - may be nonspecific enteritis  Discussed with team at [facility] and they recommended no steroids, c  Diff testing and GI consult   Admit to medicine   NPO   Stool studies pending    IV hydration   Pain and nausea medications      VTE Prophylaxis: bloody stool   / sequential compression device   Code Status: Level 1 - Full Code    Anticipated Length of Stay:  Patient will be admitted on an Inpatient basis with an anticipated length of stay of greater than 2 midnights  Justification for Hospital Stay: enteritis     Total Time for Visit, including Counseling / Coordination of Care: 15 minutes  Greater than 50% of this total time spent on direct patient counseling and coordination of care      Chief Complaint:   Abdominal Pain (patient c/o epigastric pain, bloody diarrhea, nausea starting over an hour ago)      History of Present Illness:    China Vázquez is a 27 y o  male with a PMH of ulcerative colitis post colectomy and ileal pouch formation maintained on [place] for about 7 months who presents with abdominal pain two days ago with nausea and vomiting  CT with findings of enteritis at that time  Symptoms worsened today prompting him to return to the ER  He had 30 episodes of diarrhea which became bloody towards the ends  He reports chills, +fever in ER  CT of the A/P revealed no interval change, stable mild wall thickening of the distal ileum and pouch - may be nonspecific enteritis  Discussed with team at [facility] and they recommended no steroids, c  Diff testing and GI consult  Review of Systems:    Review of Systems   Constitutional: Positive for chills  Negative for fever  HENT: Negative for ear pain and sore throat  Eyes: Negative for pain and visual disturbance  Respiratory: Negative for cough and shortness of breath  Cardiovascular: Negative for chest pain and palpitations  Gastrointestinal: Positive for abdominal pain, blood in stool and diarrhea  Negative for vomiting  Genitourinary: Negative for dysuria and hematuria  Musculoskeletal: Negative for arthralgias and back pain  Skin: Negative for color change and rash  Neurological: Negative for seizures and syncope  All other systems reviewed and are negative  Past Medical and Surgical History:     Past Medical History:   Diagnosis Date    [facility]ylosing spondylitis ([facility] )     Anxiety     Bowel obstruction (HCC)     Clostridium difficile colitis 9/13/2018    Colitis     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[facility] ) 9/13/2018    Pancreatitis     Ulcerative colitis ([facility] )        Past Surgical History:   Procedure Laterality Date    COLECTOMY TOTAL      with ileal pouch and anastemosis    TOTAL COLECTOMY         Meds/Allergies:    Prior to Admission medications    Medication Sig Start Date End Date Taking?  Authorizing Provider   DULoxetine (CYMBALTA) 30 mg delayed release capsule daily 10/26/21   Historical Provider, MD   ondansetron (Zofran ODT) 4 mg disintegrating tablet Take 1 tablet (4 mg total) by mouth every 6 (six) hours as needed for nausea or vomiting 2/21/22   Alexia Mata MD   ondansetron [facility]) 4 mg tablet Take 1 tablet (4 mg total) by mouth every 8 (eight) hours as needed for nausea or vomiting 1/4/22   Danny Bernheim, DO   oxyCODONE (Roxicodone) 5 immediate release tablet Take 1 tablet (5 mg total) by mouth every 6 (six) hours as needed for severe pain Max Daily Amount: 20 mg 1/4/22   Carlos Chau DO   psyllium (METAMUCIL) 58 6 % powder Take 1 packet by mouth 3 (three) times a day    Historical Provider, MD   Tofacitinib Citrate ER (Xeljanz XR) 11 MG TB24 Take by mouth daily    Historical Provider, MD       Allergies: Allergies   Allergen Reactions    Mesalamine GI Intolerance     Other reaction(s): Pancreatitis  Annotation - [id number]: pancreatitis       Social History:     Marital Status: Single   Substance Use History:   Social History     Substance and Sexual Activity   Alcohol Use Yes    Comment: pt states 5 a month     Social History     Tobacco Use   Smoking Status Never Smoker   Smokeless Tobacco Never Used     Social History     Substance and Sexual Activity   Drug Use No       Family History:    History reviewed  No pertinent family history  Physical Exam:     Vitals:   Blood Pressure: 140/65 (02/24/22 0135)  Pulse: (!) 108 (02/24/22 0230)  Temperature: 99 7 °F (37 6 °C) (02/24/22 0135)  Temp Source: Oral (02/24/22 0135)  Respirations: 20 (02/24/22 0135)  Weight - Scale: 81 6 kg (180 lb) (02/23/22 2009)  SpO2: 93 % (02/24/22 0230)    Physical Exam  Vitals and nursing note reviewed  Constitutional:       Appearance: Normal appearance  HENT:      Head: Normocephalic  Nose: Nose normal    Eyes:      Extraocular Movements: Extraocular movements intact  Cardiovascular:      Rate and Rhythm: Regular rhythm  Tachycardia present  Pulses: Normal pulses     Pulmonary:      Effort: Pulmonary effort is normal       Breath sounds: Normal breath sounds  Abdominal:      General: Abdomen is flat  Bowel sounds are normal  There is no distension  Palpations: Abdomen is soft  Tenderness: There is abdominal tenderness (mild, diffuse)  Musculoskeletal:         General: Normal range of motion  Skin:     General: Skin is warm and dry  Neurological:      General: No focal deficit present  Mental Status: He is alert and oriented to person, place, and time  Psychiatric:         Mood and Affect: Mood normal          Behavior: Behavior normal            Additional Data:     Lab Results: I have personally reviewed pertinent reports  Results from last 7 days   Lab Units 02/23/22 2019   WBC Thousand/uL 15 95*   HEMOGLOBIN g/dL 13 1   HEMATOCRIT % 44 3   PLATELETS Thousands/uL 479*   NEUTROS PCT % 87*     Results from last 7 days   Lab Units 02/23/22 2019   SODIUM mmol/L 134*   POTASSIUM mmol/L 3 9   CHLORIDE mmol/L 97*   CO2 mmol/L 26   BUN mg/dL 13   CREATININE mg/dL 1 34*   CALCIUM mg/dL 8 7   TOTAL BILIRUBIN mg/dL 0 76   ALK PHOS U/L 104   ALT U/L 44   AST U/L 27                 Results from last 7 days   Lab Units 02/23/22  2259   LACTIC ACID mmol/L 1 7       Imaging: I have personally reviewed pertinent reports  CT abdomen pelvis with contrast   Final Result by Pardeep Duncan MD (02/24 1901)      No significant interval change  Stable mild wall thickening of the distal ileum and pouch which may be due to nonspecific enteritis  Workstation performed: [id number]             CT abdomen pelvis with contrast   Final Result      No significant interval change  Stable mild wall thickening of the distal ileum and pouch which may be due to nonspecific enteritis  Workstation performed: [id number]             EKG, Pathology, and Other Studies Reviewed on Admission:   · EKG: not done     Allscripts / Epic Records Reviewed: Yes     ** Please Note: This note has been constructed using a voice recognition system  **

## 2022-02-24 NOTE — ED PROVIDER NOTES
History  Chief Complaint   Patient presents with    Abdominal Pain     patient c/o epigastric pain, bloody diarrhea, nausea starting over an hour ago     Patient has a history of ulcerative colitis, status post total colectomy and ileal pouch formation  He has been maintained on [place] for about 7 months  He was seen 2 days ago in the ER with some abdominal pain nausea vomiting but no anal bleeding  CT scan performed 2 days ago was unremarkable  Patient states since that time he has worsened  He had about 30 bouts of watery diarrhea today which became bloody over the course of the last hour or so  Felt subjectively feverish  Patient has nausea which has worsened but is not vomiting  He has not been able to eat or drink today he is not sure when the last time he made urine  Prior to Admission Medications   Prescriptions Last Dose Informant Patient Reported? Taking?    DULoxetine (CYMBALTA) 30 mg delayed release capsule   Yes No   Sig: daily   Tofacitinib Citrate ER (Xeljanz XR) 11 MG TB24   Yes No   Sig: Take by mouth daily   ondansetron (ZOFRAN) 4 mg tablet   No No   Sig: Take 1 tablet (4 mg total) by mouth every 8 (eight) hours as needed for nausea or vomiting   ondansetron (Zofran ODT) 4 mg disintegrating tablet   No No   Sig: Take 1 tablet (4 mg total) by mouth every 6 (six) hours as needed for nausea or vomiting   oxyCODONE (Roxicodone) 5 immediate release tablet   No No   Sig: Take 1 tablet (5 mg total) by mouth every 6 (six) hours as needed for severe pain Max Daily Amount: 20 mg   psyllium (METAMUCIL) 58 6 % powder   Yes No   Sig: Take 1 packet by mouth 3 (three) times a day      Facility-Administered Medications: None       Past Medical History:   Diagnosis Date    [facility] ([facility] )     Anxiety     Bowel obstruction (HCC)     Clostridium difficile colitis 9/13/2018    Colitis     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/13/2018    Pancreatitis     [facility]        Past Surgical History:   Procedure Laterality Date    COLECTOMY TOTAL      with ileal pouch and anastemosis    TOTAL COLECTOMY         History reviewed  No pertinent family history  I have reviewed and agree with the history as documented  E-Cigarette/Vaping    E-Cigarette Use Never User      E-Cigarette/Vaping Substances    Nicotine No     THC No     CBD No     Flavoring No     Other No     Unknown No      Social History     Tobacco Use    Smoking status: Never Smoker    Smokeless tobacco: Never Used   Vaping Use    Vaping Use: Never used   Substance Use Topics    Alcohol use: Yes     Comment: pt states 5 a month    Drug use: No       Review of Systems   Constitutional: Positive for fever  Negative for chills and diaphoresis  HENT: Negative for congestion and sore throat  Eyes: Negative for visual disturbance  Respiratory: Negative for cough and shortness of breath  Cardiovascular: Negative for chest pain  Gastrointestinal: Positive for abdominal pain, blood in stool, diarrhea and nausea  Genitourinary: Positive for decreased urine volume  Negative for dysuria  Musculoskeletal: Negative for back pain and neck pain  Skin: Negative for color change and rash  Neurological: Positive for weakness  Negative for headaches  Hematological: Does not bruise/bleed easily  Psychiatric/Behavioral: Negative for confusion  All other systems reviewed and are negative  Physical Exam  Physical Exam  Vitals and nursing note reviewed  Constitutional:       Appearance: He is well-developed  HENT:      Head: Normocephalic and atraumatic  Mouth/Throat:      Mouth: Mucous membranes are moist    Eyes:      Extraocular Movements: Extraocular movements intact  Cardiovascular:      Rate and Rhythm: Normal rate and regular rhythm  Pulmonary:      Effort: Pulmonary effort is normal    Abdominal:      General: Abdomen is flat  Bowel sounds are increased  Palpations: Abdomen is soft  Tenderness: There is abdominal tenderness in the epigastric area and left lower quadrant  Skin:     General: Skin is warm and dry  Capillary Refill: Capillary refill takes less than 2 seconds  Neurological:      General: No focal deficit present  Mental Status: He is alert     Psychiatric:         Mood and Affect: Mood normal          Behavior: Behavior normal          Vital Signs  ED Triage Vitals   Temp Pulse Respirations Blood Pressure SpO2   -- 02/23/22 2009 02/23/22 2009 02/23/22 2009 02/23/22 2009    83 20 140/79 99 %      Temp src Heart Rate Source Patient Position - Orthostatic VS BP Location FiO2 (%)   -- 02/23/22 2009 02/23/22 2128 02/23/22 2009 --    Monitor Lying Right arm       Pain Score       02/23/22 2009       8           Vitals:    02/23/22 2009 02/23/22 2128   BP: 140/79 133/66   Pulse: 83 (!) 110   Patient Position - Orthostatic VS:  Lying         Visual Acuity      ED Medications  Medications   diazepam (VALIUM) injection 5 mg (has no administration in time range)   ondansetron (ZOFRAN) injection 4 mg (4 mg Intravenous Given 2/23/22 2019)   sodium chloride 0 9 % bolus 1,000 mL (1,000 mL Intravenous New Bag 2/23/22 2019)   HYDROmorphone (DILAUDID) injection 1 mg (1 mg Intravenous Given 2/23/22 2022)   ondansetron (ZOFRAN) injection 4 mg (4 mg Intravenous Given 2/23/22 2042)   HYDROmorphone (DILAUDID) injection 1 mg (1 mg Intravenous Given 2/23/22 2129)       Diagnostic Studies  Results Reviewed     Procedure Component Value Units Date/Time    UA (URINE) with reflex to Scope [[id number]]  (Abnormal) Collected: 02/23/22 2209    Lab Status: Final result Specimen: Urine, Clean Catch Updated: 02/23/22 2219     Color, UA Yellow     Clarity, UA Clear     Specific Gravity, UA 1 025     pH, UA 6 0     Leukocytes, UA Negative     Nitrite, UA Negative     Protein, UA Negative mg/dl      Glucose, UA Negative mg/dl      Ketones, UA Trace mg/dl      Urobilinogen, UA 0 2 E U /dl      Bilirubin, UA Interference- unable to analyze     Blood, UA Trace-Intact    Urine Microscopic [[id number]] Collected: 02/23/22 2209    Lab Status: In process Specimen: Urine, Clean Catch Updated: 02/23/22 2219    C-reactive protein [[id number]]  (Abnormal) Collected: 02/23/22 2157    Lab Status: Final result Specimen: Blood from Arm, Left Updated: 02/23/22 2215     CRP 16 0 mg/L     Clostridium difficile toxin by PCR with EIA [[id number]] Collected: 02/23/22 2205    Lab Status:  In process Specimen: Stool from Per Rectum Updated: 02/23/22 2209    Comprehensive metabolic panel [[id number]]  (Abnormal) Collected: 02/23/22 2019    Lab Status: Final result Specimen: Blood from Arm, Left Updated: 02/23/22 2045     Sodium 134 mmol/L      Potassium 3 9 mmol/L      Chloride 97 mmol/L      CO2 26 mmol/L      ANION GAP 11 mmol/L      BUN 13 mg/dL      Creatinine 1 34 mg/dL      Glucose 96 mg/dL      Calcium 8 7 mg/dL      AST 27 U/L      ALT 44 U/L      Alkaline Phosphatase 104 U/L      Total Protein 8 1 g/dL      Albumin 4 1 g/dL      Total Bilirubin 0 76 mg/dL      eGFR 71 ml/min/1 73sq m     Narrative:      [place] guidelines for Chronic Kidney Disease (CKD):     Stage 1 with normal or high GFR (GFR > 90 mL/min/1 73 square meters)    Stage 2 Mild CKD (GFR = 60-89 mL/min/1 73 square meters)    Stage 3A Moderate CKD (GFR = 45-59 mL/min/1 73 square meters)    Stage 3B Moderate CKD (GFR = 30-44 mL/min/1 73 square meters)    Stage 4 Severe CKD (GFR = 15-29 mL/min/1 73 square meters)    Stage 5 End Stage CKD (GFR <15 mL/min/1 73 square meters)  Note: GFR calculation is accurate only with a steady state creatinine    Lipase [[id number]]  (Normal) Collected: 02/23/22 2019    Lab Status: Final result Specimen: Blood from Arm, Left Updated: 02/23/22 2045     Lipase 116 u/L     CBC and differential [[id number]]  (Abnormal) Collected: 02/23/22 2019    Lab Status: Final result Specimen: Blood from Arm, Left Updated: 02/23/22 2029     WBC 15 95 Thousand/uL      RBC 6 95 Million/uL      Hemoglobin 13 1 g/dL      Hematocrit 44 3 %      MCV 64 fL      MCH 18 8 pg      MCHC 29 6 g/dL      RDW 19 6 %      MPV 8 6 fL      Platelets 423 Thousands/uL      nRBC 0 /100 WBCs      Neutrophils Relative 87 %      Immat GRANS % 1 %      Lymphocytes Relative 5 %      Monocytes Relative 6 %      Eosinophils Relative 1 %      Basophils Relative 0 %      Neutrophils Absolute 13 91 Thousands/µL      Immature Grans Absolute 0 08 Thousand/uL      Lymphocytes Absolute 0 84 Thousands/µL      Monocytes Absolute 0 90 Thousand/µL      Eosinophils Absolute 0 16 Thousand/µL      Basophils Absolute 0 06 Thousands/µL                  No orders to display              Procedures  Procedures         ED Course                               SBIRT 22yo+      Most Recent Value   SBIRT (24 yo +)    In order to provide better care to our patients, we are screening all of our patients for alcohol and drug use  Would it be okay to ask you these screening questions? No Filed at: 02/23/2022 2050                    MDM  Number of Diagnoses or Management Options  Diagnosis management comments: Ulcerative colitis post total colectomy and ileal pouch, with a known history of recurrent pouchitis  Patient is likely dehydrated  Will provide pain medicine, antiemetics, rehydrate  I spoke with the patient's colorectal surgical team at [facility]  They do not recommend steroids  In light of recent antibiotics for similar presentation, they requested C diff testing    They also suggested GI evaluation      Disposition  Final diagnoses:   Abdominal pain   Ulcerative colitis ([address] )   Bloody diarrhea     Time reflects when diagnosis was documented in both MDM as applicable and the Disposition within this note     Time User Action Codes Description Comment    2/23/2022 10:21 PM Mitali Valdez Add [R10 9] Abdominal pain     2/23/2022 10:21 PM Richar JIMENEZ Add [K51 90] Ulcerative colitis ([facility] [address] )     2/23/2022 10:21 PM Karolina JIMENEZ Add [R19 7] Bloody diarrhea       ED Disposition     ED Disposition Condition Date/Time Comment    Admit Stable Wed Feb 23, 2022 10:21 PM Case was discussed with hospitalist and the patient's admission status was agreed to be Admission Status: inpatient status to the service of Dr Miguelina Stewart   Follow-up Information    None         Patient's Medications   Discharge Prescriptions    No medications on file       No discharge procedures on file      PDMP Review     None          ED Provider  Electronically Signed by           Kisha Johnson MD  02/23/22 6787

## 2022-02-24 NOTE — PROGRESS NOTES
[facility] Progress Note  Patient: Jeanine Saldana 27 y o  male   MRN: [id number]  PCP: Viarl Beverly DO  Unit/Bed#: [address] [place] [address] Encounter: [id number]  Date Of Visit: 02/24/22    Problem List:    Principal Problem:    Enteritis  Active Problems:    SIRS (systemic inflammatory response syndrome) (HCC)    Ankylosing spondylitis (HCC)    CAR (generalized anxiety disorder)      Assessment & Plan:    SIRS (systemic inflammatory response syndrome) ([facility] )  Assessment & Plan  As evidenced by fever, tachycardia, leukocytosis  CRP mildly elevated   Likely secondary enteritis and dehydration   Remains afebrile, leukocytosis downtrending   · Follow-up blood culture  · Trend CBC   · Follow-up stool studies   · Continue antibiotics as below      * Enteritis  Assessment & Plan  Patient has history of UC post colectomy and ileal pouch formation, history of ankylosing spondylosis maintained on Hortensia Mash  Presented with abdominal pain two days ago with nausea and vomiting  CT with findings of enteritis at that time  Symptoms worsened today prompting him to return to the ER  He reported 30 episodes of diarrhea which became bloody towards the ends  He reports chills, +fever in ER  CT of the A/P revealed no interval change, stable mild wall thickening of the distal ileum and pouch - may be nonspecific enteritis  Discussed with team at [facility] and they recommended no steroids, c  Diff testing and GI consult   Continue IV fluids   Follow-up stool studies    Seen by GI, input appreciated    Continue ciprofloxacin and metronidazole    Clear liquid diet   Monitor intake output    Monitor abdominal exam   Symptomatic treatment    Ankylosing spondylitis (HCC)  Assessment & Plan  On Hortensia Mash    CAR (generalized anxiety disorder)  Assessment & Plan  Continue cymbalta           VTE Pharmacologic Prophylaxis:   Moderate Risk (Score 3-4) - Pharmacological DVT Prophylaxis Contraindicated   Sequential Compression Devices Ordered  Patient Centered Rounds: I performed bedside rounds with nursing staff today  Discussions with Specialists or Other Care Team Provider:  Gastroenterology  Education and Discussions with Family / Patient: Discussed with patient  Time Spent for Care: 45 minutes  More than 50% of total time spent on counseling and coordination of care as described above  Current Length of Stay: 1 day(s)  Current Patient Status: Inpatient   Certification Statement: The patient will continue to require additional inpatient hospital stay due to Enteritis  Discharge Plan: Anticipate discharge in 48-72 hrs to home  Code Status: Level 1 - Full Code    Subjective:   Remains with multiple nose bowel movement is with abdominal g pain   No further bleeding  Nausea and hiccoughs better with medication    Objective:   Comfortably lying in bed working on laptop attending work meeting  Vitals:   Temp (24hrs), Av 7 °F (37 6 °C), Min:99 °F (37 2 °C), Max:101 3 °F (38 5 °C)    Temp:  [99 °F (37 2 °C)-101 3 °F (38 5 °C)] 99 °F (37 2 °C)  HR:  [] 103  Resp:  [15-20] 18  BP: (108-140)/(57-79) 108/61  SpO2:  [93 %-99 %] 96 %  Body mass index is 27 23 kg/m²  Input and Output Summary (last 24 hours): Intake/Output Summary (Last 24 hours) at 2022 1406  Last data filed at 2022 1001  Gross per 24 hour   Intake 2020 ml   Output --   Net 2020 ml       Physical Exam:   Physical Exam  Constitutional:       General: He is not in acute distress  HENT:      Head: Normocephalic and atraumatic  Cardiovascular:      Rate and Rhythm: Normal rate  Pulmonary:      Effort: Pulmonary effort is normal  No respiratory distress  Breath sounds: No wheezing, rhonchi or rales  Chest:      Chest wall: No tenderness  Abdominal:      General: Bowel sounds are normal  There is no distension  Palpations: Abdomen is soft  Tenderness: There is no abdominal tenderness   There is no guarding or rebound  Musculoskeletal:      Right lower leg: No edema  Left lower leg: No edema  Skin:     General: Skin is warm and dry  Findings: No rash  Neurological:      General: No focal deficit present  Mental Status: He is alert and oriented to person, place, and time  Mental status is at baseline  Cranial Nerves: No cranial nerve deficit  Additional Data:     Labs:  Results from last 7 days   Lab Units 02/24/22  0546 02/23/22 2019 02/23/22  2019   WBC Thousand/uL 8 82   < > 15 95*   HEMOGLOBIN g/dL 11 4*   < > 13 1   HEMATOCRIT % 38 4   < > 44 3   PLATELETS Thousands/uL 370   < > 479*   NEUTROS PCT %  --   --  87*   LYMPHS PCT %  --   --  5*   MONOS PCT %  --   --  6   EOS PCT %  --   --  1    < > = values in this interval not displayed  Results from last 7 days   Lab Units 02/24/22  0546   SODIUM mmol/L 134*   POTASSIUM mmol/L 4 6   CHLORIDE mmol/L 104   CO2 mmol/L 23   BUN mg/dL 11   CREATININE mg/dL 1 25   ANION GAP mmol/L 7   CALCIUM mg/dL 7 3*   ALBUMIN g/dL 3 1*   TOTAL BILIRUBIN mg/dL 0 83   ALK PHOS U/L 76   ALT U/L 35   AST U/L 39   GLUCOSE RANDOM mg/dL 88         Results from last 7 days   Lab Units 02/24/22  0734   POC GLUCOSE mg/dl 89         Results from last 7 days   Lab Units 02/23/22  2259   LACTIC ACID mmol/L 1 7       Lines/Drains:  Invasive Devices  Report    Peripheral Intravenous Line            Peripheral IV 02/23/22 Left Antecubital <1 day    Peripheral IV 02/23/22 Right Hand <1 day                      Imaging: Reviewed radiology reports from this admission including: abdominal/pelvic CT    Recent Cultures (last 7 days):   Results from last 7 days   Lab Units 02/23/22  2259 02/23/22  2250   BLOOD CULTURE  Received in Microbiology Lab  Culture in Progress  Received in Microbiology Lab  Culture in Progress         Last 24 Hours Medication List:   Current Facility-Administered Medications   Medication Dose Route Frequency Provider Last Rate    acetaminophen 650 mg Rectal Q4H PRN [place] Leal      chlorproMAZINE  25 mg Oral Q6H PRN [employer], PA-C      ciprofloxacin  400 mg Intravenous Q12H [employer], PA-C 400 mg (02/24/22 1105)    dicyclomine  10 mg Oral Q6H PRN Vinayak Nielson MD      DULoxetine  30 mg Oral Daily [place] Leal      HYDROmorphone  1 mg Intravenous Q3H PRN Vinayak Nielson MD      lactated ringers  150 mL/hr Intravenous Continuous Vinayak Nielson  mL/hr (02/24/22 0859)    metroNIDAZOLE  500 mg Intravenous Q8H MEENU Lutz-C 500 mg (02/24/22 1105)    promethazine  25 mg Intravenous Q6H PRN [place] Leal          Today, Patient Was Seen By: Vinayak Nielson MD    ** Please Note: "This note has been constructed using a voice recognition system  Therefore there may be syntax, spelling, and/or grammatical errors   Please call if you have any questions  "**

## 2022-02-24 NOTE — ED NOTES
Patient reporting significant pain at this time as well as nausea  [place] APN Brandy Barker made aware of complaint as per APN PRN pain medication may be given at this time       Aan Lopez RN  02/24/22 3087

## 2022-02-24 NOTE — PLAN OF CARE
Problem: PAIN - ADULT  Goal: Verbalizes/displays adequate comfort level or baseline comfort level  Description: Interventions:  - Encourage patient to monitor pain and request assistance  - Assess pain using appropriate pain scale  - Administer analgesics based on type and severity of pain and evaluate response  - Implement non-pharmacological measures as appropriate and evaluate response  - Consider cultural and social influences on pain and pain management  - Notify physician/advanced practitioner if interventions unsuccessful or patient reports new pain  Outcome: Progressing     Problem: GASTROINTESTINAL - ADULT  Goal: Minimal or absence of nausea and/or vomiting  Description: INTERVENTIONS:  - Administer IV fluids if ordered to ensure adequate hydration  - Maintain NPO status until nausea and vomiting are resolved  - Nasogastric tube if ordered  - Administer ordered antiemetic medications as needed  - Provide nonpharmacologic comfort measures as appropriate  - Advance diet as tolerated, if ordered  - Consider nutrition services referral to assist patient with adequate nutrition and appropriate food choices  Outcome: Progressing  Goal: Maintains or returns to baseline bowel function  Description: INTERVENTIONS:  - Assess bowel function  - Encourage oral fluids to ensure adequate hydration  - Administer IV fluids if ordered to ensure adequate hydration  - Administer ordered medications as needed  - Encourage mobilization and activity  - Consider nutritional services referral to assist patient with adequate nutrition and appropriate food choices  Outcome: Progressing  Goal: Maintains adequate nutritional intake  Description: INTERVENTIONS:  - Monitor percentage of each meal consumed  - Identify factors contributing to decreased intake, treat as appropriate  - Assist with meals as needed  - Monitor I&O, weight, and lab values if indicated  - Obtain nutrition services referral as needed  Outcome: Progressing

## 2022-02-24 NOTE — UTILIZATION REVIEW
Initial Clinical Review    Admission: Date/Time/Statement:   Admission Orders (From admission, onward)     Ordered        02/23/22 2221  Inpatient Admission  Once                      Orders Placed This Encounter   Procedures    Inpatient Admission     Standing Status:   Standing     Number of Occurrences:   1     Order Specific Question:   Level of Care     Answer:   Med Surg [16]     Order Specific Question:   Estimated length of stay     Answer:   More than 2 Midnights     Order Specific Question:   Certification     Answer:   I certify that inpatient services are medically necessary for this patient for a duration of greater than two midnights  See H&P and MD Progress Notes for additional information about the patient's course of treatment  ED Arrival Information     Expected Arrival Acuity    - 2/23/2022 20:05 Urgent         Means of arrival Escorted by Service Admission type    Walk-In Self Hospitalist Urgent         Arrival complaint    abd pain        Chief Complaint   Patient presents with    Abdominal Pain     patient c/o epigastric pain, bloody diarrhea, nausea starting over an hour ago     Initial Presentation:   26 yom to ER from home c/o abd pain with associated nausea x 2 days resulting in poor intake, decreased urine output  He was seen 2 days ago in the ER with some abdominal pain nausea vomiting but no anal bleeding  CT scan performed 2 days ago was unremarkable  Patient states since that time he has worsened  He had about 30 bouts of watery diarrhea today, now  bloody  Hx ulcerative colitis post colectomy and ileal pouch formation maintained on [place] for about 7 months  Presents febrile with chills, diffuse abd tenderness  Admission work-up showing leukocytosis, hyponatremia, elevated creatinine, CRP & sed rate, with thickening of the distal ileum and pouch on imaging  Admitted to inpatient status for enteritis     Case discussed with team at [facility] and they recommended no steroids, c  Diff testing and GI consult  Date: 2/24/22   Day 2:   Remains NPO with IVF continued  Started on IV Cipro & Flagyl by GI for enteritis  Stool studies pending  Isolation maintained  Using IV Dilaudid & Phenergan for pain & nausea respectively  Abd pain worsens with hiccups, Thorazine ordered  ED Triage Vitals   Temperature Pulse Respirations Blood Pressure SpO2   02/23/22 2228 02/23/22 2009 02/23/22 2009 02/23/22 2009 02/23/22 2009   (!) 101 3 °F (38 5 °C) 83 20 140/79 99 %      Temp Source Heart Rate Source Patient Position - Orthostatic VS BP Location FiO2 (%)   02/23/22 2228 02/23/22 2009 02/23/22 2128 02/23/22 2009 --   Oral Monitor Lying Right arm       Pain Score       02/23/22 2009       8          Wt Readings from Last 1 Encounters:   02/24/22 83 6 kg (184 lb 6 4 oz)     Additional Vital Signs:   02/24/22 0749 99 °F (37 2 °C) 103 18 108/61 -- 96 % None (Room air) Lying   02/24/22 0618 99 1 °F (37 3 °C) 101 18 125/72 88 98 % None (Room air) Lying   02/24/22 0545 -- 98 15 110/57 -- 96 % None (Room air) Lying   02/24/22 0530 -- 102 -- -- -- 93 % None (Room air) --   02/24/22 0508 -- 100 -- -- -- 98 % -- --   02/24/22 0506 -- -- -- 110/57 76 -- -- --   02/24/22 0318 99 5 °F (37 5 °C) 106 Abnormal  16 139/65 -- 93 % None (Room air) Lying   02/24/22 0230 -- 108 Abnormal  -- -- -- 93 % None (Room air) --   02/24/22 0215 -- 110 Abnormal  -- -- -- 95 % None (Room air) --   02/24/22 0135 99 7 °F (37 6 °C) 118 Abnormal  20 140/65 -- 98 % None (Room air) Lying   02/23/22 2345 -- 111 Abnormal  18 133/70 93 98 % None (Room air) Lying   02/23/22 2228 101 3 °F (38 5 °C) Abnormal  105 20 -- -- 98 % None (Room air) --     Pertinent Labs/Diagnostic Test Results:   CT abdomen pelvis with contrast      No significant interval change  Stable mild wall thickening of the distal ileum and pouch which may be due to nonspecific enteritis          Results from last 7 days   Lab Units 02/24/22  0546 02/23/22 2019 02/21/22  1209 WBC Thousand/uL 8 82 15 95* 11 07*   HEMOGLOBIN g/dL 11 4* 13 1 12 3   HEMATOCRIT % 38 4 44 3 41 6   PLATELETS Thousands/uL 370 479* 508*   NEUTROS ABS Thousands/µL  --  13 91* 7 36     Results from last 7 days   Lab Units 02/24/22  0546 02/23/22 2019 02/21/22  1208   SODIUM mmol/L 134* 134* 138   POTASSIUM mmol/L 4 6 3 9 4 3   CHLORIDE mmol/L 104 97* 100   CO2 mmol/L 23 26 28   ANION GAP mmol/L 7 11 10   BUN mg/dL 11 13 11   CREATININE mg/dL 1 25 1 34* 1 14   EGFR ml/min/1 73sq m 77 71 87   CALCIUM mg/dL 7 3* 8 7 9 4   MAGNESIUM mg/dL 1 6  --   --      Results from last 7 days   Lab Units 02/24/22  0546 02/23/22 2019 02/21/22  1208   AST U/L 39 27 44   ALT U/L 35 44 42   ALK PHOS U/L 76 104 102   TOTAL PROTEIN g/dL 6 5 8 1 7 9   ALBUMIN g/dL 3 1* 4 1 3 8   TOTAL BILIRUBIN mg/dL 0 83 0 76 0 69     Results from last 7 days   Lab Units 02/24/22  0734   POC GLUCOSE mg/dl 89     Results from last 7 days   Lab Units 02/24/22  0546 02/23/22  2019 02/21/22  1208   GLUCOSE RANDOM mg/dL 88 96 88     Results from last 7 days   Lab Units 02/23/22  2259   LACTIC ACID mmol/L 1 7     Results from last 7 days   Lab Units 02/23/22  2019   LIPASE u/L 116     Results from last 7 days   Lab Units 02/23/22 2157 02/21/22  1208   CRP mg/L 16 0* 16 2*   SED RATE mm/hour  --  17*     Results from last 7 days   Lab Units 02/24/22  0546 02/23/22  2209   CLARITY UA  Clear Clear   COLOR UA  Yellow Yellow   SPEC GRAV UA  1 010 1 025   PH UA  6 0 6 0   GLUCOSE UA mg/dl Negative Negative   KETONES UA mg/dl Negative Trace*   BLOOD UA  Negative Trace-Intact*   PROTEIN UA mg/dl Negative Negative   NITRITE UA  Negative Negative   BILIRUBIN UA  Negative Interference- unable to analyze*   UROBILINOGEN UA E U /dl 0 2 0 2   LEUKOCYTES UA  Negative Negative   WBC UA /hpf  --  None Seen   RBC UA /hpf  --  0-1   BACTERIA UA /hpf  --  Occasional   EPITHELIAL CELLS WET PREP /hpf  --  None Seen   MUCUS THREADS   --  Moderate*     ED Treatment: Medication Administration from 02/23/2022 2005 to 02/24/2022 0447       Date/Time Order Dose Route Action     02/23/2022 2019 ondansetron (ZOFRAN) injection 4 mg 4 mg Intravenous Given     02/23/2022 2019 sodium chloride 0 9 % bolus 1,000 mL 1,000 mL Intravenous New Bag     02/23/2022 2022 HYDROmorphone (DILAUDID) injection 1 mg 1 mg Intravenous Given     02/23/2022 2042 ondansetron (ZOFRAN) injection 4 mg 4 mg Intravenous Given     02/23/2022 2129 HYDROmorphone (DILAUDID) injection 1 mg 1 mg Intravenous Given     02/23/2022 2223 diazepam (VALIUM) injection 5 mg 5 mg Intravenous Given     02/23/2022 2341 sodium chloride 0 9 % bolus 1,000 mL 1,000 mL Intravenous New Bag     02/24/2022 0510 promethazine (PHENERGAN) injection 25 mg 25 mg Intravenous Given     02/23/2022 2302 promethazine (PHENERGAN) injection 25 mg 25 mg Intravenous Given     02/23/2022 2300 HYDROmorphone (DILAUDID) injection 0 5 mg 0 5 mg Intravenous Given     02/23/2022 2335 iohexol (OMNIPAQUE) 350 MG/ML injection (SINGLE-DOSE) 100 mL 100 mL Intravenous Given     02/24/2022 0004 acetaminophen (TYLENOL) rectal suppository 650 mg 650 mg Rectal Given     02/24/2022 0510 HYDROmorphone (DILAUDID) injection 0 5 mg 0 5 mg Intravenous Given     02/24/2022 0224 HYDROmorphone (DILAUDID) injection 0 5 mg 0 5 mg Intravenous Given     02/24/2022 0314 lactated ringers infusion 125 mL/hr Intravenous New Bag        Past Medical History:   Diagnosis Date    [facility]yl[facility]yl[facility]     Anxiety     Bowel obstruction ([facility] )     Clostridium difficile colitis 9/13/2018    Colitis     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[facility] ) 9/13/2018    Pancreatitis     Ulcerative colitis ([facility] )      Present on Admission:   Enteritis   CAR (generalized anxiety disorder)    Admitting Diagnosis: Ulcerative colitis ([facility] ) [K51 90]  Abdominal pain [R10 9]  Bloody diarrhea [R19 7]  Age/Sex: 27 y o  male  Admission Orders:  NPO  Consult GI  Scd/foot pumps  Strict contact & hand hygiene isolation    Scheduled Medications:  ciprofloxacin (CIPRO) IVPB 400 mg 200 mL Q12H  DULoxetine, 30 mg, Oral, Daily  metroNIDAZOLE (FLAGYL) IVPB 500 mg 100 mL, Q8H    Continuous IV Infusions:  lactated ringers, 125 mL/hr, Intravenous, Continuous    PRN Meds:  acetaminophen, 650 mg, Rectal, Q4H PRN  chlorproMAZINE (THORAZINE) tablet 25 mg, Oral, Q6H, PRN  HYDROmorphone, 0 5 mg, Intravenous, Q3H PRN-used x3  HYDROmorphone (DILAUDID) injection 1 mg, Q3H, PRN  promethazine, 25 mg, Intravenous, Q6H PRN-used x3/24hrs    Network Utilization Review Department  ATTENTION: Please call with any questions or concerns to [phone] and carefully listen to the prompts so that you are directed to the right person  All voicemails are confidential   [place] Doing all requests for admission clinical reviews, approved or denied determinations and any other requests to dedicated fax number below belonging to the campus where the patient is receiving treatment   List of dedicated fax numbers for the Facilities:  [address] DENIALS (Administrative/Medical Necessity) [phone]   [address] (Maternity/NICU/Pediatrics) [phone] [address]  [address] [facility] [place] [address] [address] [place] [address] [address] [facility] [address] [facility] [phone]

## 2022-02-24 NOTE — CONSULTS
Consultation -[facility]   Maribell Alicia 27 y o  male MRN: [id number]    Unit/Bed#: [facility] 325-01 Encounter: [id number]      Physician Requesting Consult: Dr Leverette Gaucher    Reason for Consult / Principal Problem: enteris, hx of UC    HPI:This is a 27 y o  male with a PMH of ulcerative colitis post colectomy and ileal pouch formation maintained on Cleda Ely for about 7 months who presents with abdominal pain two days ago with nausea and vomiting  CT with findings of enteritis at that time  Symptoms worsened today prompting him to return to the ER  He reports that he had 30 episodes of diarrhea which became bloody towards the ends  He reports chills, +fever in ER  CT of the A/P revealed no interval change, stable mild wall thickening of the distal ileum and pouch - may be nonspecific enteritis  Case was discussed with team at [facility] on admission and they recommended no steroids, c  Diff testing and GI consult  Pt currently was admitted last month with similar presentation  Patient with history of ulcerative colitis status post total proctocolectomy and J-pouch ileo anal anastomosis in 2015 with report of prior anastomotic strictures dilated and tight angulation at the proximal end of the pouch  History of prior Clostridium difficile colitis prior to colectomy  Presented with vomiting, hiccups worsening abdominal pain and diarrhea which began acutely yesterday morning  His abdomen feels bloated and he is having pain and pain is worse when he hiccups  He denies any other antibiotics other than after discharge on last admission which was Cipro and Flagyl and he was feeling well  He otherwise states that his nephew who lives with him did have some vomiting and gastroenteritis symptoms although he denies close contact with him  Allergies:    Allergies   Allergen Reactions    Mesalamine GI Intolerance     Other reaction(s): Pancreatitis  Annotation - [id number]: pancreatitis Medications:  Current Facility-Administered Medications:     acetaminophen (TYLENOL) rectal suppository 650 mg, 650 mg, Rectal, Q4H PRN, Jayson Core, CRNP, 650 mg at 02/24/22 0004    DULoxetine (CYMBALTA) delayed release capsule 30 mg, 30 mg, Oral, Daily, Jayson Core, CRNP, 30 mg at 02/24/22 3729    HYDROmorphone (DILAUDID) injection 0 5 mg, 0 5 mg, Intravenous, Q3H PRN, Jayson Core, CRNP, 0 5 mg at 02/24/22 0510    lactated ringers infusion, 125 mL/hr, Intravenous, Continuous, Jayson Core, CRNP, Last Rate: 125 mL/hr at 02/24/22 0859, 125 mL/hr at 02/24/22 0859    promethazine (PHENERGAN) injection 25 mg, 25 mg, Intravenous, Q6H PRN, Jayson Core, CRNP, 25 mg at 02/24/22 0510    Past Medical history:  Past Medical History:   Diagnosis Date    [facility]     Anxiety     Bowel obstruction ([facility] )     Clostridium difficile colitis 9/13/2018    Colitis     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[facility] ) 9/13/2018    Pancreatitis     Ulcerative colitis ([facility] )        Past Surgical History:   Past Surgical History:   Procedure Laterality Date    COLECTOMY TOTAL      with ileal pouch and anastemosis    TOTAL COLECTOMY         Family History: History reviewed  No pertinent family history      Social history:   Social History     Socioeconomic History    Marital status: Single     Spouse name: Not on file    Number of children: Not on file    Years of education: Not on file    Highest education level: Not on file   Occupational History    Not on file   Tobacco Use    Smoking status: Never Smoker    Smokeless tobacco: Never Used   Vaping Use    Vaping Use: Never used   Substance and Sexual Activity    Alcohol use: Yes     Comment: pt states 5 a month/socially    Drug use: No    Sexual activity: Not on file   Other Topics Concern    Not on file   Social History Narrative    Not on file     Social Determinants of Health     Financial Resource Strain: Not on file   Food Insecurity: Not on file   Transportation Needs: Not on file   Physical Activity: Not on file   Stress: Not on file   Social Connections: Not on file   Intimate Partner Violence: Not on file   Housing Stability: Not on file       Review of Systems: All other systems were reviewed and were negative, otherwise please refer to HPI    Physical Exam: /61 (BP Location: Right arm)   Pulse 103   Temp 99 °F (37 2 °C) (Oral)   Resp 18   Ht 5' 9" (1 753 m)   Wt 83 6 kg (184 lb 6 4 oz)   SpO2 96%   BMI 27 23 kg/m²     General Appearance:    Alert, cooperative, no distress, appears stated age   Head:    Normocephalic, without obvious abnormality, atraumatic   Eyes:    No scleral icterus           Mouth:  Mucosa moist   Neck:   Supple, symmetrical, trachea midline, no thyromegaly       Lungs:     Clear to auscultation bilaterally, respirations unlabored       Heart:    Regular rate and rhythm, S1 and S2 normal, no murmur, rub   or gallop     Abdomen:     Soft, non-tender, bowel sounds active all four quadrants,     no masses, no organomegaly   Genitalia:   deferred   Rectal:   deferred   Extremities:   Extremities normal,no cyanosis or edema       Skin:   Skin color, texture, turgor normal, no rashes or lesions       Neurologic:   Grossly intact, no focal deficit           Lab Results:   Recent Results (from the past 24 hour(s))   CBC and differential    Collection Time: 02/23/22  8:19 PM   Result Value Ref Range    WBC 15 95 (H) 4 31 - 10 16 Thousand/uL    RBC 6 95 (H) 3 88 - 5 62 Million/uL    Hemoglobin 13 1 12 0 - 17 0 g/dL    Hematocrit 44 3 36 5 - 49 3 %    MCV 64 (L) 82 - 98 fL    MCH 18 8 (L) 26 8 - 34 3 pg    MCHC 29 6 (L) 31 4 - 37 4 g/dL    RDW 19 6 (H) 11 6 - 15 1 %    MPV 8 6 (L) 8 9 - 12 7 fL    Platelets 440 (H) 543 - 390 Thousands/uL    nRBC 0 /100 WBCs    Neutrophils Relative 87 (H) 43 - 75 %    Immat GRANS % 1 0 - 2 %    Lymphocytes Relative 5 (L) 14 - 44 %    Monocytes Relative 6 4 - 12 %    Eosinophils Relative 1 0 - 6 %    Basophils Relative 0 0 - 1 %    Neutrophils Absolute 13 91 (H) 1 85 - 7 62 Thousands/µL    Immature Grans Absolute 0 08 0 00 - 0 20 Thousand/uL    Lymphocytes Absolute 0 84 0 60 - 4 47 Thousands/µL    Monocytes Absolute 0 90 0 17 - 1 22 Thousand/µL    Eosinophils Absolute 0 16 0 00 - 0 61 Thousand/µL    Basophils Absolute 0 06 0 00 - 0 10 Thousands/µL   Comprehensive metabolic panel    Collection Time: 02/23/22  8:19 PM   Result Value Ref Range    Sodium 134 (L) 136 - 145 mmol/L    Potassium 3 9 3 5 - 5 3 mmol/L    Chloride 97 (L) 100 - 108 mmol/L    CO2 26 21 - 32 mmol/L    ANION GAP 11 4 - 13 mmol/L    BUN 13 5 - 25 mg/dL    Creatinine 1 34 (H) 0 60 - 1 30 mg/dL    Glucose 96 65 - 140 mg/dL    Calcium 8 7 8 3 - 10 1 mg/dL    AST 27 5 - 45 U/L    ALT 44 12 - 78 U/L    Alkaline Phosphatase 104 46 - 116 U/L    Total Protein 8 1 6 4 - 8 2 g/dL    Albumin 4 1 3 5 - 5 0 g/dL    Total Bilirubin 0 76 0 20 - 1 00 mg/dL    eGFR 71 ml/min/1 73sq m   Lipase    Collection Time: 02/23/22  8:19 PM   Result Value Ref Range    Lipase 116 73 - 393 u/L   C-reactive protein    Collection Time: 02/23/22  9:57 PM   Result Value Ref Range    CRP 16 0 (H) <3 0 mg/L   UA (URINE) with reflex to Scope    Collection Time: 02/23/22 10:09 PM   Result Value Ref Range    Color, UA Yellow     Clarity, UA Clear     Specific Gravity, UA 1 025 1 000 - 1 030    pH, UA 6 0 5 0, 5 5, 6 0, 6 5, 7 0, 7 5, 8 0, 8 5, 9 0    Leukocytes, UA Negative Negative    Nitrite, UA Negative Negative    Protein, UA Negative Negative mg/dl    Glucose, UA Negative Negative mg/dl    Ketones, UA Trace (A) Negative mg/dl    Urobilinogen, UA 0 2 0 2, 1 0 E U /dl E U /dl    Bilirubin, UA Interference- unable to analyze (A) Negative    Blood, UA Trace-Intact (A) Negative   Urine Microscopic    Collection Time: 02/23/22 10:09 PM   Result Value Ref Range    RBC, UA 0-1 None Seen, 0-1, 1-2, 2-4, 0-5 /hpf    WBC, UA None Seen None Seen, 0-1, 1-2, 0-5, 2-4 /hpf    Epithelial Cells None Seen None Seen, Occasional /hpf    Bacteria, UA Occasional None Seen, Occasional /hpf    MUCUS THREADS Moderate (A) None Seen   Lactic acid    Collection Time: 02/23/22 10:59 PM   Result Value Ref Range    LACTIC ACID 1 7 0 5 - 2 0 mmol/L   UA w Reflex to Microscopic w Reflex to Culture    Collection Time: 02/24/22  5:46 AM    Specimen: Urine, Clean Catch   Result Value Ref Range    Color, UA Yellow     Clarity, UA Clear     Specific [place], UA 1 010 1 000 - 1 030    pH, UA 6 0 5 0, 5 5, 6 0, 6 5, 7 0, 7 5, 8 0, 8 5, 9 0    Leukocytes, UA Negative Negative    Nitrite, UA Negative Negative    Protein, UA Negative Negative mg/dl    Glucose, UA Negative Negative mg/dl    Ketones, UA Negative Negative mg/dl    Urobilinogen, UA 0 2 0 2, 1 0 E U /dl E U /dl    Bilirubin, UA Negative Negative    Blood, UA Negative Negative   Comprehensive metabolic panel    Collection Time: 02/24/22  5:46 AM   Result Value Ref Range    Sodium 134 (L) 136 - 145 mmol/L    Potassium 4 6 3 5 - 5 3 mmol/L    Chloride 104 100 - 108 mmol/L    CO2 23 21 - 32 mmol/L    ANION GAP 7 4 - 13 mmol/L    BUN 11 5 - 25 mg/dL    Creatinine 1 25 0 60 - 1 30 mg/dL    Glucose 88 65 - 140 mg/dL    Calcium 7 3 (L) 8 3 - 10 1 mg/dL    Corrected Calcium 8 0 (L) 8 3 - 10 1 mg/dL    AST 39 5 - 45 U/L    ALT 35 12 - 78 U/L    Alkaline Phosphatase 76 46 - 116 U/L    Total Protein 6 5 6 4 - 8 2 g/dL    Albumin 3 1 (L) 3 5 - 5 0 g/dL    Total Bilirubin 0 83 0 20 - 1 00 mg/dL    eGFR 77 ml/min/1 73sq m   Magnesium    Collection Time: 02/24/22  5:46 AM   Result Value Ref Range    Magnesium 1 6 1 6 - 2 6 mg/dL   CBC (With Platelets)    Collection Time: 02/24/22  5:46 AM   Result Value Ref Range    WBC 8 82 4 31 - 10 16 Thousand/uL    RBC 5 93 (H) 3 88 - 5 62 Million/uL    Hemoglobin 11 4 (L) 12 0 - 17 0 g/dL    Hematocrit 38 4 36 5 - 49 3 %    MCV 65 (L) 82 - 98 fL    MCH 19 2 (L) 26 8 - 34 3 pg    MCHC 29 7 (L) 31 4 - 37 4 g/dL    RDW 18 7 (H) 11 6 - 15 1 %    Platelets 007 055 - 598 Thousands/uL    MPV 8 7 (L) 8 9 - 12 7 fL   Fingerstick Glucose (POCT)    Collection Time: 02/24/22  7:34 AM   Result Value Ref Range    POC Glucose 89 65 - 140 mg/dl       Imaging Studies: CT abdomen pelvis with contrast    Result Date: 2/24/2022  Narrative: CT ABDOMEN AND PELVIS WITH IV CONTRAST INDICATION:   Sepsis  COMPARISON:  CT of abdomen pelvis on February 21, 2022  TECHNIQUE:  CT examination of the abdomen and pelvis was performed  Axial, sagittal, and coronal 2D reformatted images were created from the source data and submitted for interpretation  Radiation dose length product (DLP) for this visit:  756 79 mGy-cm   This examination, like all CT scans performed in the [facility], was performed utilizing techniques to minimize radiation dose exposure, including the use of iterative  reconstruction and automated exposure control  IV Contrast:  100 mL of iohexol (OMNIPAQUE) Enteric Contrast:  Enteric contrast was not administered  FINDINGS: ABDOMEN LOWER CHEST:  No clinically significant abnormality identified in the visualized lower chest  LIVER/BILIARY TREE:  Unremarkable  GALLBLADDER:  No calcified gallstones  No pericholecystic inflammatory change  SPLEEN:  Unremarkable  PANCREAS:  Unremarkable  ADRENAL GLANDS:  Unremarkable  KIDNEYS/URETERS:  Unremarkable  No hydronephrosis  STOMACH AND BOWEL:  Status post colectomy and ileal pouch anal anastomosis  Stable mild wall thickening of the distal ileum and pouch  No bowel obstruction  Stable distention of the rectal pouch  Stable gaseous distention of a loop of bowel in the upper abdomen  APPENDIX:  Absent ABDOMINOPELVIC CAVITY:  No ascites  No pneumoperitoneum  Stable enlarged right external iliac lymph node measuring 1 5 cm in short axis (series 2, image 69)  Stable right para-aortic lymph noted measuring 1 2 cm in short axis (series 2, and 49) VESSELS:  Unremarkable for patient's age  PELVIS REPRODUCTIVE ORGANS:  Unremarkable for patient's age  URINARY BLADDER:  Unremarkable  ABDOMINAL WALL/INGUINAL REGIONS:  Unremarkable  OSSEOUS STRUCTURES:  No acute fracture or destructive osseous lesion  Impression: No significant interval change  Stable mild wall thickening of the distal ileum and pouch which may be due to nonspecific enteritis  Workstation performed: [id number]     CT abdomen pelvis w contrast    Result Date: 2/21/2022  Narrative: CT ABDOMEN AND PELVIS WITH IV CONTRAST INDICATION:   llq pain history of crohn's/pancolitis  COMPARISON:  January 2, 2022 TECHNIQUE:  CT examination of the abdomen and pelvis was performed  Axial, sagittal, and coronal 2D reformatted images were created from the source data and submitted for interpretation  Radiation dose length product (DLP) for this visit:  170 99 mGy-cm   This examination, like all CT scans performed in the [facility], was performed utilizing techniques to minimize radiation dose exposure, including the use of iterative  reconstruction and automated exposure control  IV Contrast:  100 mL of iohexol (OMNIPAQUE) Enteric Contrast:  Enteric contrast was not administered  FINDINGS: ABDOMEN LOWER CHEST:  No clinically significant abnormality identified in the visualized lower chest  LIVER/BILIARY TREE:  Unremarkable  GALLBLADDER:  Contracted  No calcified stones  SPLEEN:  Unremarkable  PANCREAS:  Unremarkable  ADRENAL GLANDS:  Unremarkable  KIDNEYS/URETERS:  Unremarkable  No hydronephrosis  STOMACH AND BOWEL: As stated previously: Patient is status post colectomy and ileal pouch-anal anastomosis  There is no evidence of obstruction  The previous mild bowel wall thickening and mucosal hyperemia in right lower quadrant small bowel creating the ileal pouch persists, (currently best seen on image 2/61-2/79)  Most likely chronic inflammatory enteritis   APPENDIX:  Removed ABDOMINOPELVIC CAVITY:  Stable 1 2 cm aortocaval node (image 2/50) and a stable 1 5 cm right external iliac node  (Image 2/68)  Both have demonstrated long-term stability  Trace fluid in the right pelvis in the obturator region  No free air  VESSELS:  Unremarkable for patient's age  PELVIS REPRODUCTIVE ORGANS:  Unremarkable for patient's age  URINARY BLADDER:  Unremarkable  ABDOMINAL WALL/INGUINAL REGIONS:  Unremarkable  OSSEOUS STRUCTURES:  No acute fracture or destructive osseous lesion  Impression: Patient is status post colectomy and ileal pouch-anal anastomosis  Similar appearance compared from prior exam with persistent mild small bowel wall thickening involving the distal small bowel creating the pouch, but with less pronounced mucosal enhancement  This likely represents chronic inflammatory enteritis  No obstruction or perforation  Trace amounts of pelvic fluid is seen in the right obturator location  Persistent aortocaval and right external iliac adenopathy, which have demonstrated long-term stability  Workstation performed: [id number]       Assessment/Plan:    18-year-old male who presents again with acute nausea vomiting and abdominal pain  CT scan had shown  stable mild wall thickening of the distal ileum and pouch which may be due to nonspecific enteritis  Patient noted to have leukocytosis on admission as well as increased temp  Patient is immunocompromised given underlying IBD and use of tofacitinib, at risk of opportunistic infections as well  CT scan findings could be consistent with infectious enteritis versus pouchitis  Patient does have history of total colectomy and less likely C diff although possible  Stool for C diff is pending  Other stool studies have been ordered  Cannot exclude viral enteritis as patient does have sick contact at home  Pending his course, may need pouchoscopy  Has required multiple dilatations in the past and last 1 was about 2 years ago  Patient with significant hiccups and will order Thorazine as needed  Will start on Cipro and Flagyl  Patient did respond well to this on prior admission and was asymptomatic up until early yesterday morning  We will follow his course  May need to touch base again with [facility] gastroenterologist to further discuss patient's case pending his course  Thank you for the consultation  Case will be discussed with Dr Stephanie Mcdonald  Spoke with hospitalist regarding case

## 2022-02-25 PROBLEM — D64.9 ANEMIA: Status: ACTIVE | Noted: 2022-02-25

## 2022-02-25 PROBLEM — R78.81 POSITIVE BLOOD CULTURES: Status: ACTIVE | Noted: 2022-02-25

## 2022-02-25 LAB
ALBUMIN SERPL BCP-MCNC: 3 G/DL (ref 3.5–5)
ALP SERPL-CCNC: 71 U/L (ref 46–116)
ALT SERPL W P-5'-P-CCNC: 29 U/L (ref 12–78)
ANION GAP SERPL CALCULATED.3IONS-SCNC: 5 MMOL/L (ref 4–13)
AST SERPL W P-5'-P-CCNC: 19 U/L (ref 5–45)
BASOPHILS # BLD AUTO: 0.05 THOUSANDS/ΜL (ref 0–0.1)
BASOPHILS NFR BLD AUTO: 1 % (ref 0–1)
BILIRUB SERPL-MCNC: 0.76 MG/DL (ref 0.2–1)
BUN SERPL-MCNC: 9 MG/DL (ref 5–25)
CALCIUM ALBUM COR SERPL-MCNC: 8.9 MG/DL (ref 8.3–10.1)
CALCIUM SERPL-MCNC: 8.1 MG/DL (ref 8.3–10.1)
CHLORIDE SERPL-SCNC: 100 MMOL/L (ref 100–108)
CO2 SERPL-SCNC: 29 MMOL/L (ref 21–32)
CREAT SERPL-MCNC: 1.09 MG/DL (ref 0.6–1.3)
EOSINOPHIL # BLD AUTO: 0.23 THOUSAND/ΜL (ref 0–0.61)
EOSINOPHIL NFR BLD AUTO: 2 % (ref 0–6)
ERYTHROCYTE [DISTWIDTH] IN BLOOD BY AUTOMATED COUNT: 18.6 % (ref 11.6–15.1)
FERRITIN SERPL-MCNC: 257 NG/ML (ref 8–388)
GFR SERPL CREATININE-BSD FRML MDRD: 91 ML/MIN/1.73SQ M
GLUCOSE SERPL-MCNC: 83 MG/DL (ref 65–140)
HCT VFR BLD AUTO: 36.4 % (ref 36.5–49.3)
HGB BLD-MCNC: 10.6 G/DL (ref 12–17)
IMM GRANULOCYTES # BLD AUTO: 0.05 THOUSAND/UL (ref 0–0.2)
IMM GRANULOCYTES NFR BLD AUTO: 1 % (ref 0–2)
IRON SATN MFR SERPL: 6 % (ref 20–50)
IRON SERPL-MCNC: 17 UG/DL (ref 65–175)
LYMPHOCYTES # BLD AUTO: 1.16 THOUSANDS/ΜL (ref 0.6–4.47)
LYMPHOCYTES NFR BLD AUTO: 11 % (ref 14–44)
MAGNESIUM SERPL-MCNC: 1.8 MG/DL (ref 1.6–2.6)
MCH RBC QN AUTO: 18.8 PG (ref 26.8–34.3)
MCHC RBC AUTO-ENTMCNC: 29.1 G/DL (ref 31.4–37.4)
MCV RBC AUTO: 65 FL (ref 82–98)
MONOCYTES # BLD AUTO: 1.33 THOUSAND/ΜL (ref 0.17–1.22)
MONOCYTES NFR BLD AUTO: 12 % (ref 4–12)
NEUTROPHILS # BLD AUTO: 8.1 THOUSANDS/ΜL (ref 1.85–7.62)
NEUTS SEG NFR BLD AUTO: 73 % (ref 43–75)
NRBC BLD AUTO-RTO: 0 /100 WBCS
PHOSPHATE SERPL-MCNC: 2.7 MG/DL (ref 2.7–4.5)
PLATELET # BLD AUTO: 334 THOUSANDS/UL (ref 149–390)
PMV BLD AUTO: 8.9 FL (ref 8.9–12.7)
POTASSIUM SERPL-SCNC: 3.7 MMOL/L (ref 3.5–5.3)
PROT SERPL-MCNC: 6.4 G/DL (ref 6.4–8.2)
RBC # BLD AUTO: 5.64 MILLION/UL (ref 3.88–5.62)
SODIUM SERPL-SCNC: 134 MMOL/L (ref 136–145)
TIBC SERPL-MCNC: 270 UG/DL (ref 250–450)
WBC # BLD AUTO: 10.92 THOUSAND/UL (ref 4.31–10.16)

## 2022-02-25 PROCEDURE — 83540 ASSAY OF IRON: CPT | Performed by: INTERNAL MEDICINE

## 2022-02-25 PROCEDURE — 83735 ASSAY OF MAGNESIUM: CPT | Performed by: INTERNAL MEDICINE

## 2022-02-25 PROCEDURE — 87040 BLOOD CULTURE FOR BACTERIA: CPT | Performed by: INTERNAL MEDICINE

## 2022-02-25 PROCEDURE — 83550 IRON BINDING TEST: CPT | Performed by: INTERNAL MEDICINE

## 2022-02-25 PROCEDURE — 84100 ASSAY OF PHOSPHORUS: CPT | Performed by: INTERNAL MEDICINE

## 2022-02-25 PROCEDURE — 85025 COMPLETE CBC W/AUTO DIFF WBC: CPT | Performed by: INTERNAL MEDICINE

## 2022-02-25 PROCEDURE — 87505 NFCT AGENT DETECTION GI: CPT | Performed by: INTERNAL MEDICINE

## 2022-02-25 PROCEDURE — 89055 LEUKOCYTE ASSESSMENT FECAL: CPT | Performed by: INTERNAL MEDICINE

## 2022-02-25 PROCEDURE — 99232 SBSQ HOSP IP/OBS MODERATE 35: CPT | Performed by: INTERNAL MEDICINE

## 2022-02-25 PROCEDURE — 82728 ASSAY OF FERRITIN: CPT | Performed by: INTERNAL MEDICINE

## 2022-02-25 PROCEDURE — 99232 SBSQ HOSP IP/OBS MODERATE 35: CPT | Performed by: PHYSICIAN ASSISTANT

## 2022-02-25 PROCEDURE — 80053 COMPREHEN METABOLIC PANEL: CPT | Performed by: INTERNAL MEDICINE

## 2022-02-25 RX ORDER — SODIUM CHLORIDE, SODIUM LACTATE, POTASSIUM CHLORIDE, CALCIUM CHLORIDE 600; 310; 30; 20 MG/100ML; MG/100ML; MG/100ML; MG/100ML
75 INJECTION, SOLUTION INTRAVENOUS CONTINUOUS
Status: DISCONTINUED | OUTPATIENT
Start: 2022-02-25 | End: 2022-02-27

## 2022-02-25 RX ADMIN — Medication 1500 MG: at 04:14

## 2022-02-25 RX ADMIN — DULOXETINE HYDROCHLORIDE 30 MG: 30 CAPSULE, DELAYED RELEASE ORAL at 08:40

## 2022-02-25 RX ADMIN — METRONIDAZOLE 500 MG: 500 INJECTION, SOLUTION INTRAVENOUS at 02:48

## 2022-02-25 RX ADMIN — HYDROMORPHONE HYDROCHLORIDE 1 MG: 1 INJECTION, SOLUTION INTRAMUSCULAR; INTRAVENOUS; SUBCUTANEOUS at 09:42

## 2022-02-25 RX ADMIN — VANCOMYCIN HYDROCHLORIDE 1750 MG: 10 INJECTION, POWDER, LYOPHILIZED, FOR SOLUTION INTRAVENOUS at 15:45

## 2022-02-25 RX ADMIN — METRONIDAZOLE 500 MG: 500 INJECTION, SOLUTION INTRAVENOUS at 19:53

## 2022-02-25 RX ADMIN — CIPROFLOXACIN 400 MG: 2 INJECTION, SOLUTION INTRAVENOUS at 09:42

## 2022-02-25 RX ADMIN — HYDROMORPHONE HYDROCHLORIDE 1 MG: 1 INJECTION, SOLUTION INTRAMUSCULAR; INTRAVENOUS; SUBCUTANEOUS at 02:48

## 2022-02-25 RX ADMIN — PROMETHAZINE HYDROCHLORIDE 25 MG: 25 INJECTION INTRAMUSCULAR; INTRAVENOUS at 09:42

## 2022-02-25 RX ADMIN — SODIUM CHLORIDE, SODIUM LACTATE, POTASSIUM CHLORIDE, AND CALCIUM CHLORIDE 100 ML/HR: .6; .31; .03; .02 INJECTION, SOLUTION INTRAVENOUS at 22:38

## 2022-02-25 RX ADMIN — HYDROMORPHONE HYDROCHLORIDE 1 MG: 1 INJECTION, SOLUTION INTRAMUSCULAR; INTRAVENOUS; SUBCUTANEOUS at 22:41

## 2022-02-25 RX ADMIN — METRONIDAZOLE 500 MG: 500 INJECTION, SOLUTION INTRAVENOUS at 11:13

## 2022-02-25 RX ADMIN — HYDROMORPHONE HYDROCHLORIDE 1 MG: 1 INJECTION, SOLUTION INTRAMUSCULAR; INTRAVENOUS; SUBCUTANEOUS at 15:43

## 2022-02-25 RX ADMIN — SODIUM CHLORIDE, SODIUM LACTATE, POTASSIUM CHLORIDE, AND CALCIUM CHLORIDE 150 ML/HR: .6; .31; .03; .02 INJECTION, SOLUTION INTRAVENOUS at 09:42

## 2022-02-25 RX ADMIN — HYDROMORPHONE HYDROCHLORIDE 1 MG: 1 INJECTION, SOLUTION INTRAMUSCULAR; INTRAVENOUS; SUBCUTANEOUS at 19:30

## 2022-02-25 RX ADMIN — CIPROFLOXACIN 400 MG: 2 INJECTION, SOLUTION INTRAVENOUS at 22:32

## 2022-02-25 NOTE — ASSESSMENT & PLAN NOTE
As evidenced by fever, tachycardia, leukocytosis  CRP mildly elevated   Likely secondary enteritis and dehydration, bacteremia  Remains afebrile, leukocytosis improved  · Continue antibiotics

## 2022-02-25 NOTE — ASSESSMENT & PLAN NOTE
Blood cultures obtained on admission 2/2 noted to be positive for polymicrobial growth revealing Staphylococcus hominis, Streptococcus parasanguinis, Streptococcus mitis oralis, Streptococcus salivarius  · Continue cefazolin as per ID   · Follow up repeat blood cultures-remains negative so far  · ID evaluation  · Followup ECHO

## 2022-02-25 NOTE — PROGRESS NOTES
Vancomycin Assessment    Tc Pearce is a 27 y o  male who is currently receiving vancomycin 1500 mg q12h (17 5mg/kg) for bacteremia     Relevant clinical data and objective history reviewed:  Creatinine   Date Value Ref Range Status   02/25/2022 1 09 0 60 - 1 30 mg/dL Final     Comment:     Standardized to IDMS reference method   02/24/2022 1 25 0 60 - 1 30 mg/dL Final     Comment:     Standardized to IDMS reference method   02/23/2022 1 34 (H) 0 60 - 1 30 mg/dL Final     Comment:     Standardized to IDMS reference method   10/31/2015 0 9 0 6 - 1 3 mg/dL    10/30/2015 1 0 0 6 - 1 3 mg/dL      /66 (BP Location: Right arm)   Pulse 78   Temp 97 9 °F (36 6 °C) (Oral)   Resp 18   Ht 5' 9" (1 753 m)   Wt 83 6 kg (184 lb 6 4 oz)   SpO2 98%   BMI 27 23 kg/m²   I/O last 3 completed shifts: In: 0066 [P O :100; I V :1820; IV Piggyback:2300]  Out: 450 [Stool:450]  Lab Results   Component Value Date/Time    BUN 9 02/25/2022 05:36 AM    BUN 7 10/26/2018 07:34 AM    WBC 10 92 (H) 02/25/2022 05:36 AM    WBC 9 0 10/31/2015 06:00 AM    HGB 10 6 (L) 02/25/2022 05:36 AM    HGB 10 5 (L) 10/31/2015 06:00 AM    HCT 36 4 (L) 02/25/2022 05:36 AM    HCT 34 2 (L) 10/31/2015 06:00 AM    MCV 65 (L) 02/25/2022 05:36 AM    MCV 61 3 (L) 10/31/2015 06:00 AM     02/25/2022 05:36 AM     10/31/2015 06:00 AM     Temp Readings from Last 3 Encounters:   02/25/22 97 9 °F (36 6 °C) (Oral)   02/21/22 97 9 °F (36 6 °C) (Oral)   01/04/22 97 6 °F (36 4 °C)     Vancomycin Days of Therapy: 1    Assessment/Plan  The patient is currently on vancomycin utilizing scheduled dosing  Baseline risks associated with therapy include: concomitant nephrotoxic medications  The patient is receiving 1500 mg q12h (17 5mg/kg) with the most recent vancomycin level not available yet but based on CRCL and a goal of 15-20 (appropriate for most indications) ; therefore, after clinical evaluation will be changed to 1750 mg q12h (20mg/kg)   Pharmacy will continue to follow closely for s/sx of nephrotoxicity, infusion reactions, and appropriateness of therapy  BMP and CBC will be ordered per protocol  Plan for trough as patient approaches steady state, prior to the 4th  dose at approximately 15:30 on 2-  Pharmacy will continue to follow the patients culture results and clinical progress daily      Maida Bob, Pharmacist

## 2022-02-25 NOTE — PROGRESS NOTES
Vancomycin Assessment    Angelika Velasco is a 27 y o  male who is currently receiving vancomycin 1500mg (17 5mg/kg) Q 12hrs for bacteremia     Relevant clinical data and objective history reviewed:  Creatinine   Date Value Ref Range Status   02/24/2022 1 25 0 60 - 1 30 mg/dL Final     Comment:     Standardized to IDMS reference method   02/23/2022 1 34 (H) 0 60 - 1 30 mg/dL Final     Comment:     Standardized to IDMS reference method   02/21/2022 1 14 0 60 - 1 30 mg/dL Final     Comment:     Standardized to IDMS reference method   10/31/2015 0 9 0 6 - 1 3 mg/dL    10/30/2015 1 0 0 6 - 1 3 mg/dL      /58 (BP Location: Right arm)   Pulse 79   Temp 98 1 °F (36 7 °C) (Oral)   Resp 18   Ht 5' 9" (1 753 m)   Wt 83 6 kg (184 lb 6 4 oz)   SpO2 98%   BMI 27 23 kg/m²   I/O last 3 completed shifts: In: 2020 [I V :20; IV Piggyback:2000]  Out: -   Lab Results   Component Value Date/Time    BUN 11 02/24/2022 05:46 AM    BUN 7 10/26/2018 07:34 AM    WBC 8 82 02/24/2022 05:46 AM    WBC 9 0 10/31/2015 06:00 AM    HGB 11 4 (L) 02/24/2022 05:46 AM    HGB 10 5 (L) 10/31/2015 06:00 AM    HCT 38 4 02/24/2022 05:46 AM    HCT 34 2 (L) 10/31/2015 06:00 AM    MCV 65 (L) 02/24/2022 05:46 AM    MCV 61 3 (L) 10/31/2015 06:00 AM     02/24/2022 05:46 AM     10/31/2015 06:00 AM     Temp Readings from Last 3 Encounters:   02/24/22 98 1 °F (36 7 °C) (Oral)   02/21/22 97 9 °F (36 6 °C) (Oral)   01/04/22 97 6 °F (36 4 °C)     Vancomycin Days of Therapy: 1    Assessment/Plan  The patient is currently on vancomycin utilizing scheduled dosing based on actual body weight  Baseline risks associated with therapy include: dehydration  The patient is currently receiving 1500mg (17 5mg/kg) Q 12hrs and is clinically appropriate and dose will be continued  Pharmacy will also follow closely for s/sx of nephrotoxicity, infusion reactions, and appropriateness of therapy  BMP and CBC will be ordered per protocol    Plan for trough as patient approaches steady state, prior to the 4th  dose at approximately 1530 on 2/26/22  Due to infection severity, will target a trough of 15-20 (appropriate for most indications)   Pharmacy will continue to follow the patients culture results and clinical progress daily      Columba Fitzgerald, Pharmacist

## 2022-02-25 NOTE — ASSESSMENT & PLAN NOTE
Microcytic  Downtrending during hospitalization likely dilutional with associated with mild GI loss  No evidence of acute overt bleeding at present  · Continue to monitor  · Iron sat 6%, will initiate iron on discharge

## 2022-02-25 NOTE — ASSESSMENT & PLAN NOTE
Patient has history of UC post colectomy and ileal pouch formation, [place] [facility], nausea/vomiting, diarrhea with hematochezia   CT of the A/P revealed no interval change, stable mild wall thickening of the distal ileum and pouch - may be nonspecific enteritis    Discussed with team at [facility] on admission and they recommended no steroids, c  diff testing and GI consult   Obstructive series without evidence for obstruction   stool C diff negative, bacterial PCR negative   GI following, input appreciated    Initially treated with ciprofloxacin and metronidazole - now discontinued   Continue diet as tolerated   Patient is for pouchoscopy

## 2022-02-25 NOTE — PLAN OF CARE
Problem: PAIN - ADULT  Goal: Verbalizes/displays adequate comfort level or baseline comfort level  Description: Interventions:  - Encourage patient to monitor pain and request assistance  - Assess pain using appropriate pain scale  - Administer analgesics based on type and severity of pain and evaluate response  - Implement non-pharmacological measures as appropriate and evaluate response  - Consider cultural and social influences on pain and pain management  - Notify physician/advanced practitioner if interventions unsuccessful or patient reports new pain  Outcome: Progressing     Problem: GASTROINTESTINAL - ADULT  Goal: Minimal or absence of nausea and/or vomiting  Description: INTERVENTIONS:  - Administer IV fluids if ordered to ensure adequate hydration  - Maintain NPO status until nausea and vomiting are resolved  - Administer ordered antiemetic medications as needed  - Provide nonpharmacologic comfort measures as appropriate  - Advance diet as tolerated, if ordered  - Consider nutrition services referral to assist patient with adequate nutrition and appropriate food choices  Outcome: Progressing  Goal: Maintains or returns to baseline bowel function  Description: INTERVENTIONS:  - Assess bowel function  - Encourage oral fluids to ensure adequate hydration  - Administer IV fluids if ordered to ensure adequate hydration  - Administer ordered medications as needed  - Encourage mobilization and activity  - Consider nutritional services referral to assist patient with adequate nutrition and appropriate food choices  Outcome: Progressing  Goal: Maintains adequate nutritional intake  Description: INTERVENTIONS:  - Monitor percentage of each meal consumed  - Identify factors contributing to decreased intake, treat as appropriate  - Assist with meals as needed  - Monitor I&O, weight, and lab values if indicated  - Obtain nutrition services referral as needed  Outcome: Progressing     Problem: Nutrition/Hydration-ADULT  Goal: Nutrient/Hydration intake appropriate for improving, restoring or maintaining nutritional needs  Description: Monitor and assess patient's nutrition/hydration status for malnutrition  Collaborate with interdisciplinary team and initiate plan and interventions as ordered  Monitor patient's weight and dietary intake as ordered or per policy  Utilize nutrition screening tool and intervene as necessary  Determine patient's food preferences and provide high-protein, high-caloric foods as appropriate       INTERVENTIONS:  - Monitor oral intake, urinary output, labs, and treatment plans  - Assess nutrition and hydration status and recommend course of action  - Evaluate amount of meals eaten  - Assist patient with eating if necessary   - Allow adequate time for meals  - Recommend/ encourage appropriate diets, oral nutritional supplements, and vitamin/mineral supplements  - Order, calculate, and assess calorie counts as needed  - Assess need for intravenous fluids  - Provide specific nutrition/hydration education as appropriate  - Include patient/family/caregiver in decisions related to nutrition  Outcome: Progressing

## 2022-02-25 NOTE — PROGRESS NOTES
Progress Note - [facility] 27 y o  male MRN: [id number]    Unit/Bed#: [address] [place] [address] Encounter: [id number]        Assessment/Plan:    47-year-old male who presents again with acute nausea vomiting and abdominal pain  CT scan had shown  stable mild wall thickening of the distal ileum and pouch which may be due to nonspecific enteritis  Patient noted to have leukocytosis on admission as well as increased temp  WBCs increased today to 10 92   blood cultures preliminarily grew Gram-positive cocci in chains as well as clusters, currently being redrawn  Vancomycin added  Patient is immunocompromised given underlying IBD and use of tofacitinib, at risk of opportunistic infections as well  CT scan findings could be consistent with infectious enteritis versus pouchitis  C diff negative  Other stool studies are pending  Pending his course, may need pouchoscopy  Has required multiple dilatations in the past and last 1 was about 2 years ago  Patient had significant hiccups which have essentially resolved and ordered Thorazine as needed  Was started on Cipro and Flagyl  May need to touch base again with [facility] gastroenterologist to further discuss patient's case pending his course  Abdominal pain has improved, tolerated clears, will advance to lactose restricted, low-fiber low residue diet as tolerated  We will follow  Subjective:   Patient is lying in bed  Was requesting pain meds throughout the night  Stool chart is on door but patient states he was going to the bathroom overnight but did not know he was supposed to tell staff  Nurse denies any blood in his stool, stool was liquid  Patient states that pain is getting better and comes and goes and he believes stool frequency has improved  Blood cultures being redrawn currently  Hiccups have essentially resolved      Objective:     Vitals: /60 (BP Location: Right arm)   Pulse 74   Temp 98 °F (36 7 °C) (Oral)   Resp 18   Ht 5' 9" (1 753 m)   [facility] Progress Note





- Psychiatric Progress Note


Patient seen today, length of contact: pt evaluated discussed with team chart 

reviewed


Patient Chief Complaint: 





I hear the voices telling me to jump off the window


Problems Identified/Issues Discussed: 





pt seen in bed, dressed in a hospital gown, unkempt , isolative, pt reported 

continues to experience  command auditory hallucinations making her anxious, 


reported the voices telling her she is worthless and she should end her life by 

jumping out of the window


also feeling down and anhedonic, increased sleep and poor energy





Medical Problems: 





multiple inpatient hospitalizations for psychotic symptoms


DSM 5 Symptoms Update: 





schizoaffective disorder depressed


Medication Change: Yes (increase oral risperidone)


Medical Record Reviewed: Yes





Mental Status Examination





- Cognitive Function


Orientation: Person, Place


Memory: Intact


Attention: Poor


Concentration: Poor


Association: WNL


Fund of Knowledge: Poor





- Mood


Mood: Depressed, Anxious





- Affect


Affect: Constricted, Blunted





- Speech


Speech: Soft





- Formal Thought Process


Formal Thought Process: Delusions, Paranoia, Circumstantial





- Suicidal Ideation


Suicidal Ideation: No





- Homicidal Ideation


Homicidal Ideation: No





Goal/Treatment Plan





- Goal/Treatment Plan


Need for Continued Stay: Severe depression anxiety, Discharge may exacerbated 

symptoms


Progress Toward Problem(s) and Goals/Treatment Plan: 


increase oral risperidne to 2mg bid


start thorazine 100mg q8 prn as pt is allergic to haldol


continue  risperidone consta 25mg im


vistaril prn for anxiety, remeron 30mg qhs for depression


1:1 precautions for suicidal risk





group and supportive therapy


Estimated Date of D/C: 12/29/17 83 6 kg (184 lb 6 4 oz)   SpO2 98%   BMI 27 23 kg/m²       Physical Exam:  Gen-alert no acute distress  Abd-positive bowel sounds, nondistended, some mild diffuse tenderness palpation especially epigastric area, no rebound rigidity or guarding       Lab, Imaging and other studies:   Recent Results (from the past 72 hour(s))   CBC and differential    Collection Time: 02/23/22  8:19 PM   Result Value Ref Range    WBC 15 95 (H) 4 31 - 10 16 Thousand/uL    RBC 6 95 (H) 3 88 - 5 62 Million/uL    Hemoglobin 13 1 12 0 - 17 0 g/dL    Hematocrit 44 3 36 5 - 49 3 %    MCV 64 (L) 82 - 98 fL    MCH 18 8 (L) 26 8 - 34 3 pg    MCHC 29 6 (L) 31 4 - 37 4 g/dL    RDW 19 6 (H) 11 6 - 15 1 %    MPV 8 6 (L) 8 9 - 12 7 fL    Platelets 820 (H) 448 - 390 Thousands/uL    nRBC 0 /100 WBCs    Neutrophils Relative 87 (H) 43 - 75 %    Immat GRANS % 1 0 - 2 %    Lymphocytes Relative 5 (L) 14 - 44 %    Monocytes Relative 6 4 - 12 %    Eosinophils Relative 1 0 - 6 %    Basophils Relative 0 0 - 1 %    Neutrophils Absolute 13 91 (H) 1 85 - 7 62 Thousands/µL    Immature Grans Absolute 0 08 0 00 - 0 20 Thousand/uL    Lymphocytes Absolute 0 84 0 60 - 4 47 Thousands/µL    Monocytes Absolute 0 90 0 17 - 1 22 Thousand/µL    Eosinophils Absolute 0 16 0 00 - 0 61 Thousand/µL    Basophils Absolute 0 06 0 00 - 0 10 Thousands/µL   Comprehensive metabolic panel    Collection Time: 02/23/22  8:19 PM   Result Value Ref Range    Sodium 134 (L) 136 - 145 mmol/L    Potassium 3 9 3 5 - 5 3 mmol/L    Chloride 97 (L) 100 - 108 mmol/L    CO2 26 21 - 32 mmol/L    ANION GAP 11 4 - 13 mmol/L    BUN 13 5 - 25 mg/dL    Creatinine 1 34 (H) 0 60 - 1 30 mg/dL    Glucose 96 65 - 140 mg/dL    Calcium 8 7 8 3 - 10 1 mg/dL    AST 27 5 - 45 U/L    ALT 44 12 - 78 U/L    Alkaline Phosphatase 104 46 - 116 U/L    Total Protein 8 1 6 4 - 8 2 g/dL    Albumin 4 1 3 5 - 5 0 g/dL    Total Bilirubin 0 76 0 20 - 1 00 mg/dL    eGFR 71 ml/min/1 73sq m   Lipase    Collection Time: 02/23/22 8:19 PM   Result Value Ref Range    Lipase 116 73 - 393 u/L   C-reactive protein    Collection Time: 02/23/22  9:57 PM   Result Value Ref Range    CRP 16 0 (H) <3 0 mg/L   Clostridium difficile toxin by PCR with EIA    Collection Time: 02/23/22 10:05 PM    Specimen: Per Rectum; Stool   Result Value Ref Range    C difficile toxin by PCR Negative Negative   UA (URINE) with reflex to Scope    Collection Time: 02/23/22 10:09 PM   Result Value Ref Range    Color, UA Yellow     Clarity, UA Clear     Specific Gravity, UA 1 025 1 000 - 1 030    pH, UA 6 0 5 0, 5 5, 6 0, 6 5, 7 0, 7 5, 8 0, 8 5, 9 0    Leukocytes, UA Negative Negative    Nitrite, UA Negative Negative    Protein, UA Negative Negative mg/dl    Glucose, UA Negative Negative mg/dl    Ketones, UA Trace (A) Negative mg/dl    Urobilinogen, UA 0 2 0 2, 1 0 E U /dl E U /dl    Bilirubin, UA Interference- unable to analyze (A) Negative    Blood, UA Trace-Intact (A) Negative   Urine Microscopic    Collection Time: 02/23/22 10:09 PM   Result Value Ref Range    RBC, UA 0-1 None Seen, 0-1, 1-2, 2-4, 0-5 /hpf    WBC, UA None Seen None Seen, 0-1, 1-2, 0-5, 2-4 /hpf    Epithelial Cells None Seen None Seen, Occasional /hpf    Bacteria, UA Occasional None Seen, Occasional /hpf    MUCUS THREADS Moderate (A) None Seen   Blood culture #1    Collection Time: 02/23/22 10:50 PM    Specimen: Arm, Left; Blood   Result Value Ref Range    Gram Stain Result Gram positive cocci in chains (A)     Gram Stain Result Gram positive cocci in clusters (A)    Blood culture #2    Collection Time: 02/23/22 10:59 PM    Specimen: Arm, Left; Blood   Result Value Ref Range    Gram Stain Result Gram positive cocci in chains (A)     Gram Stain Result Gram positive cocci in clusters (A)    Lactic acid    Collection Time: 02/23/22 10:59 PM   Result Value Ref Range    LACTIC ACID 1 7 0 5 - 2 0 mmol/L   UA w Reflex to Microscopic w Reflex to Culture    Collection Time: 02/24/22  5:46 AM    Specimen: Urine, Clean Catch   Result Value Ref Range    Color, UA Yellow     Clarity, UA Clear     Specific [place], UA 1 010 1 000 - 1 030    pH, UA 6 0 5 0, 5 5, 6 0, 6 5, 7 0, 7 5, 8 0, 8 5, 9 0    Leukocytes, UA Negative Negative    Nitrite, UA Negative Negative    Protein, UA Negative Negative mg/dl    Glucose, UA Negative Negative mg/dl    Ketones, UA Negative Negative mg/dl    Urobilinogen, UA 0 2 0 2, 1 0 E U /dl E U /dl    Bilirubin, UA Negative Negative    Blood, UA Negative Negative   Comprehensive metabolic panel    Collection Time: 02/24/22  5:46 AM   Result Value Ref Range    Sodium 134 (L) 136 - 145 mmol/L    Potassium 4 6 3 5 - 5 3 mmol/L    Chloride 104 100 - 108 mmol/L    CO2 23 21 - 32 mmol/L    ANION GAP 7 4 - 13 mmol/L    BUN 11 5 - 25 mg/dL    Creatinine 1 25 0 60 - 1 30 mg/dL    Glucose 88 65 - 140 mg/dL    Calcium 7 3 (L) 8 3 - 10 1 mg/dL    Corrected Calcium 8 0 (L) 8 3 - 10 1 mg/dL    AST 39 5 - 45 U/L    ALT 35 12 - 78 U/L    Alkaline Phosphatase 76 46 - 116 U/L    Total Protein 6 5 6 4 - 8 2 g/dL    Albumin 3 1 (L) 3 5 - 5 0 g/dL    Total Bilirubin 0 83 0 20 - 1 00 mg/dL    eGFR 77 ml/min/1 73sq m   Magnesium    Collection Time: 02/24/22  5:46 AM   Result Value Ref Range    Magnesium 1 6 1 6 - 2 6 mg/dL   CBC (With Platelets)    Collection Time: 02/24/22  5:46 AM   Result Value Ref Range    WBC 8 82 4 31 - 10 16 Thousand/uL    RBC 5 93 (H) 3 88 - 5 62 Million/uL    Hemoglobin 11 4 (L) 12 0 - 17 0 g/dL    Hematocrit 38 4 36 5 - 49 3 %    MCV 65 (L) 82 - 98 fL    MCH 19 2 (L) 26 8 - 34 3 pg    MCHC 29 7 (L) 31 4 - 37 4 g/dL    RDW 18 7 (H) 11 6 - 15 1 %    Platelets 724 757 - 334 Thousands/uL    MPV 8 7 (L) 8 9 - 12 7 fL   Fingerstick Glucose (POCT)    Collection Time: 02/24/22  7:34 AM   Result Value Ref Range    POC Glucose 89 65 - 140 mg/dl   CBC and differential    Collection Time: 02/25/22  5:36 AM   Result Value Ref Range    WBC 10 92 (H) 4 31 - 10 16 Thousand/uL    RBC 5 64 (H) 3 88 - 5 62 Million/uL    Hemoglobin 10 6 (L) 12 0 - 17 0 g/dL    Hematocrit 36 4 (L) 36 5 - 49 3 %    MCV 65 (L) 82 - 98 fL    MCH 18 8 (L) 26 8 - 34 3 pg    MCHC 29 1 (L) 31 4 - 37 4 g/dL    RDW 18 6 (H) 11 6 - 15 1 %    MPV 8 9 8 9 - 12 7 fL    Platelets 575 416 - 128 Thousands/uL    nRBC 0 /100 WBCs    Neutrophils Relative 73 43 - 75 %    Immat GRANS % 1 0 - 2 %    Lymphocytes Relative 11 (L) 14 - 44 %    Monocytes Relative 12 4 - 12 %    Eosinophils Relative 2 0 - 6 %    Basophils Relative 1 0 - 1 %    Neutrophils Absolute 8 10 (H) 1 85 - 7 62 Thousands/µL    Immature Grans Absolute 0 05 0 00 - 0 20 Thousand/uL    Lymphocytes Absolute 1 16 0 60 - 4 47 Thousands/µL    Monocytes Absolute 1 33 (H) 0 17 - 1 22 Thousand/µL    Eosinophils Absolute 0 23 0 00 - 0 61 Thousand/µL    Basophils Absolute 0 05 0 00 - 0 10 Thousands/µL   Comprehensive metabolic panel    Collection Time: 02/25/22  5:36 AM   Result Value Ref Range    Sodium 134 (L) 136 - 145 mmol/L    Potassium 3 7 3 5 - 5 3 mmol/L    Chloride 100 100 - 108 mmol/L    CO2 29 21 - 32 mmol/L    ANION GAP 5 4 - 13 mmol/L    BUN 9 5 - 25 mg/dL    Creatinine 1 09 0 60 - 1 30 mg/dL    Glucose 83 65 - 140 mg/dL    Calcium 8 1 (L) 8 3 - 10 1 mg/dL    Corrected Calcium 8 9 8 3 - 10 1 mg/dL    AST 19 5 - 45 U/L    ALT 29 12 - 78 U/L    Alkaline Phosphatase 71 46 - 116 U/L    Total Protein 6 4 6 4 - 8 2 g/dL    Albumin 3 0 (L) 3 5 - 5 0 g/dL    Total Bilirubin 0 76 0 20 - 1 00 mg/dL    eGFR 91 ml/min/1 73sq m   Magnesium    Collection Time: 02/25/22  5:36 AM   Result Value Ref Range    Magnesium 1 8 1 6 - 2 6 mg/dL   Phosphorus    Collection Time: 02/25/22  5:36 AM   Result Value Ref Range    Phosphorus 2 7 2 7 - 4 5 mg/dL

## 2022-02-25 NOTE — PROGRESS NOTES
[facility] Progress Note  Patient: Felipa Lopez 27 y o  male   MRN: [id number]  PCP: Elly William DO  Unit/Bed#: [address] [place] [address] Encounter: [id number]  Date Of Visit: 02/25/22    Problem List:    Principal Problem:    Enteritis  Active Problems:    SIRS (systemic inflammatory response syndrome) (HCC)    Ankylosing spondylitis (HCC)    Positive blood cultures    CAR (generalized anxiety disorder)    Anemia      Assessment & Plan:    SIRS (systemic inflammatory response syndrome) (HCC)  Assessment & Plan  As evidenced by fever, tachycardia, leukocytosis  CRP mildly elevated   Likely secondary enteritis and dehydration , rule out bacteremia  Remains afebrile, leukocytosis improved for admission but mildly elevated compared to yesterday today  · Follow-up blood culture-2/2 GPC but obtained from same site on admission  ? Contaminant  · Repeat blood cultures  · Trend CBC and fever  · Follow-up stool studies   · Continue antibiotics as below      * Enteritis  Assessment & Plan  Patient has history of UC post colectomy and ileal pouch formation, follows up at [address]   history of ankylosing spondylosis maintained on [place] [facility]  Presented with abdominal pain two days ago with nausea and vomiting  CT with findings of enteritis at that time  Symptoms worsened prompting him to return to the ER  He reported 30 episodes of diarrhea which became bloody towards the ends  He reports chills, +fever in ER  CT of the A/P revealed no interval change, stable mild wall thickening of the distal ileum and pouch - may be nonspecific enteritis  Discussed with team at [address] and they recommended no steroids, c  Diff testing and GI consult  Diarrhea frequency is improving  Pain/discomfort is better  No further bleeding   Continue IV fluids   Follow-up stool studies -stool C diff negative    Follow-up bacterial PCR   GI following, input appreciated   Continue ciprofloxacin and metronidazole    Will advance to full liquid diet and will advance further as tolerated   Monitor intake output    Monitor abdominal exam   Symptomatic treatment   Follow-up blood cultures    Positive blood cultures  Assessment & Plan  Blood cultures obtained on admission 2/2 noted to be positive for GPC  Appears to be obtained from the same site and the time  · Patient was started on vancomycin overnight, will continue  · Will get repeat blood culture  · Monitor fever and CBC  · Follow-up culture sensitivities    Ankylosing spondylitis ([address] )  Assessment & Plan  On Xeljanz    Anemia  Assessment & Plan  microcytic  Downtrending during hospitalization likely dilutional with associated with mild GI loss  No evidence of acute overt bleeding at present  · Continue to monitor  · Check iron studies      CAR (generalized anxiety disorder)  Assessment & Plan  Continue cymbalta           VTE Pharmacologic Prophylaxis:   Moderate Risk (Score 3-4) - Pharmacological DVT Prophylaxis Contraindicated  Sequential Compression Devices Ordered  Patient Centered Rounds: I performed bedside rounds with nursing staff today  Discussions with Specialists or Other Care Team Provider:  Gastroenterology  Education and Discussions with Family / Patient: Discussed with patient  Time Spent for Care: 45 minutes  More than 50% of total time spent on counseling and coordination of care as described above  Current Length of Stay: 2 day(s)  Current Patient Status: Inpatient   Certification Statement: The patient will continue to require additional inpatient hospital stay due to Enteritis  Discharge Plan: Anticipate discharge in 48-72 hrs to home      Code Status: Level 1 - Full Code    Subjective:   Frequency of diarrhea is improving only 3 large loose bowel movements overnight without any blood  Abdominal pain and came pain is improving  Denies any further hiccoughs, mild nausea    Admission blood cultures reported to be positive with Gram-positive cocci  Denies any fever, chills, wounds  Denies any history of central line or hardware    Objective:   Comfortably lying in bed working on laptop attending work meeting  Vitals:   Temp (24hrs), Av °F (36 7 °C), Min:97 9 °F (36 6 °C), Max:98 1 °F (36 7 °C)    Temp:  [97 9 °F (36 6 °C)-98 1 °F (36 7 °C)] 98 °F (36 7 °C)  HR:  [] 74  Resp:  [18] 18  BP: (109-131)/(58-66) 109/60  SpO2:  [98 %] 98 % on room air  Body mass index is 27 23 kg/m²  Input and Output Summary (last 24 hours): Intake/Output Summary (Last 24 hours) at 2022 1441  Last data filed at 2022 1012  Gross per 24 hour   Intake 2340 ml   Output 450 ml   Net 1890 ml       Physical Exam:   Physical Exam  Constitutional:       General: He is not in acute distress  HENT:      Head: Normocephalic and atraumatic  Cardiovascular:      Rate and Rhythm: Normal rate  Pulmonary:      Effort: Pulmonary effort is normal  No respiratory distress  Breath sounds: No wheezing, rhonchi or rales  Chest:      Chest wall: No tenderness  Abdominal:      General: Bowel sounds are normal  There is no distension  Palpations: Abdomen is soft  Tenderness: There is no abdominal tenderness  There is no guarding or rebound  Musculoskeletal:      Cervical back: Neck supple  Right lower leg: No edema  Left lower leg: No edema  Skin:     General: Skin is warm and dry  Findings: No rash  Neurological:      Mental Status: He is alert  Mental status is at baseline  Cranial Nerves: No cranial nerve deficit           Additional Data:     Labs:  Results from last 7 days   Lab Units 22  0536   WBC Thousand/uL 10 92*   HEMOGLOBIN g/dL 10 6*   HEMATOCRIT % 36 4*   PLATELETS Thousands/uL 334   NEUTROS PCT % 73   LYMPHS PCT % 11*   MONOS PCT % 12   EOS PCT % 2     Results from last 7 days   Lab Units 22  0536   SODIUM mmol/L 134*   POTASSIUM mmol/L 3 7   CHLORIDE mmol/L 100   CO2 mmol/L 29   BUN mg/dL 9   CREATININE mg/dL 1 09 ANION GAP mmol/L 5   CALCIUM mg/dL 8 1*   ALBUMIN g/dL 3 0*   TOTAL BILIRUBIN mg/dL 0 76   ALK PHOS U/L 71   ALT U/L 29   AST U/L 19   GLUCOSE RANDOM mg/dL 83         Results from last 7 days   Lab Units 02/24/22  0734   POC GLUCOSE mg/dl 89         Results from last 7 days   Lab Units 02/23/22  2259   LACTIC ACID mmol/L 1 7       Lines/Drains:  Invasive Devices  Report    Peripheral Intravenous Line            Peripheral IV 02/23/22 Left Antecubital 1 day    Peripheral IV 02/23/22 Right Hand 1 day                      Imaging: Reviewed radiology reports from this admission including: abdominal/pelvic CT    Recent Cultures (last 7 days):   Results from last 7 days   Lab Units 02/23/22  2259 02/23/22  2250 02/23/22  2205   GRAM STAIN RESULT  Gram positive cocci in chains*  Gram positive cocci in clusters* Gram positive cocci in chains*  Gram positive cocci in clusters*  --    C DIFF TOXIN B BY PCR   --   --  Negative       Last 24 Hours Medication List:   Current Facility-Administered Medications   Medication Dose Route Frequency Provider Last Rate    acetaminophen  650 mg Rectal Q4H PRN [place] Espinoza      chlorproMAZINE  25 mg Oral Q6H PRN [facility], PA-C      ciprofloxacin  400 mg Intravenous Q12H [facility], PA-C 400 mg (02/25/22 0942)    dicyclomine  10 mg Oral Q6H PRN Annette Becerra MD      DULoxetine  30 mg Oral Daily [place] Espinoza      HYDROmorphone  1 mg Intravenous Q3H PRN Annette Becerra MD      lactated ringers  150 mL/hr Intravenous Continuous Annette Becerra  mL/hr (02/25/22 0942)    metroNIDAZOLE  500 mg Intravenous Q8H Shruthi Boles PA-C 500 mg (02/25/22 1113)    promethazine  25 mg Intravenous Q6H PRN [place] Espinoza      vancomycin  20 mg/kg Intravenous Q12H [place] Espinoza          Today, Patient Was Seen By: Annette Becerra MD    ** Please Note: "This note has been constructed using a voice recognition system  Therefore there may be syntax, spelling, and/or grammatical errors   Please call if you have any questions  "**

## 2022-02-25 NOTE — APP STUDENT NOTE
[facility] STUDENT  Inpatient Progress Note for TRAINING ONLY  Not Part of Legal Medical Record       Progress Note - Felipa Lopez 27 y o  male MRN: [id number]    Unit/Bed#: [address] [place] [address] Encounter: [id number]      Assessment:  1  SIRS  2  Enteritis  3  Ankylosing spondylitis   4  Generalized anxiety disorder    Plan:  1  Patient had fever, tachycardia, and leukocytosis in the ED after having many episodes of diarrhea   -Lactic acid was normal    -CRP elevated at 16 0 on admission   -Most likely secondary to enteritis     -Fever and tachycardia have resolved    -Blood cultures 2 out of 2 positive for gram positive cocci in chains and cluster    -Both blood cultures were drawn from the left arm   -Placed on IV Vancomycin   -Consulted pharmacy     -Most likely secondary to contamination    -Redraw blood cultures from 2 different sites   -Continue IV Vancomycin until results of second set of blood cultures   2  Patient presented to the ED 2 days prior to admission with chief complaint of abdominal pain, nausea, and vomiting  Patient with history of UC and is s/p ileal pouch formation  Returned to ED on 2/23 after having 30 episodes of diarrhea which became bloody just prior to presentation  -CT on 2/21 revealed evidence of enteritis   -Repeat CT on 2/23 revealed stable mild wall thickening of distal ileum and pouch with no significant change from previous CT   -His treatment was discussed with his team at [facility], who recommended C diff testing, GI consult, and no steroids    -Infectious vs inflammation    -C diff was negative    -Stool enteric panel and fecal leukocytes pending    -CBC trended up to 10 92, but is down from 15 95 on admission   -Continue Cipro and Flagyl   -Advance diet to lactose restricted, low fiber, low reside diet    -Supportive care with Bentyl, Zofran, Thorazine, and Phenergan  3  On Campbell Weaver   -Last dose 2/23   -Hold due to possible infectious enteritis   4   Continue Cymbalta     Subjective: Patient denies any overnight events and states that he is feeling slightly better  Reports persistent generalized "crampy" abdominal pain  Pain is relieved with current analgesic regimen  Reports having 3 episodes of diarrhea since last night  Hiccups and nausea have greatly improved  Tolerated clear liquids yesterday  Urinating, sleeping, and ambulating without difficulty  Denies fevers, chills, chest pain, palpations, SOB, vomitting, hematochezia, melena, mucus in stool, skin breaks/lesions, metal or prosthetic material in body  Objective:     Vitals: Blood pressure 109/60, pulse 74, temperature 98 °F (36 7 °C), temperature source Oral, resp  rate 18, height 5' 9" (1 753 m), weight 83 6 kg (184 lb 6 4 oz), SpO2 98 %  ,Body mass index is 27 23 kg/m²  Intake/Output Summary (Last 24 hours) at 2/25/2022 1006  Last data filed at 2/25/2022 0542  Gross per 24 hour   Intake 2200 ml   Output 450 ml   Net 1750 ml       Physical Exam: General appearance: alert and oriented, in no acute distress  Head: Normocephalic, without obvious abnormality, atraumatic  Eyes: No scleral icterus or conjunctival injection   Ears: External ears without abnormalities   Nose: no discharge  Lungs: clear to auscultation bilaterally  Heart: regular rate and rhythm  Abdomen: soft, non-tender; bowel sounds normal; no masses,  no organomegaly  Extremities: extremities normal, warm and well-perfused; no cyanosis, clubbing, or edema  Skin: Skin color, texture, turgor normal  No rashes or lesions     Invasive Devices  Report    Peripheral Intravenous Line            Peripheral IV 02/23/22 Left Antecubital 1 day    Peripheral IV 02/23/22 Right Hand 1 day                Lab, Imaging and other studies: I have personally reviewed pertinent reports      VTE Pharmacologic Prophylaxis: RX contraindicated due to: Rectal bleeding   VTE Mechanical Prophylaxis: sequential compression device

## 2022-02-26 LAB
ANION GAP SERPL CALCULATED.3IONS-SCNC: 8 MMOL/L (ref 4–13)
BASOPHILS # BLD AUTO: 0.05 THOUSANDS/ΜL (ref 0–0.1)
BASOPHILS NFR BLD AUTO: 1 % (ref 0–1)
BUN SERPL-MCNC: 7 MG/DL (ref 5–25)
CALCIUM SERPL-MCNC: 7.8 MG/DL (ref 8.3–10.1)
CAMPYLOBACTER DNA SPEC NAA+PROBE: NORMAL
CHLORIDE SERPL-SCNC: 102 MMOL/L (ref 100–108)
CO2 SERPL-SCNC: 27 MMOL/L (ref 21–32)
CREAT SERPL-MCNC: 1.01 MG/DL (ref 0.6–1.3)
EOSINOPHIL # BLD AUTO: 0.29 THOUSAND/ΜL (ref 0–0.61)
EOSINOPHIL NFR BLD AUTO: 4 % (ref 0–6)
ERYTHROCYTE [DISTWIDTH] IN BLOOD BY AUTOMATED COUNT: 17.3 % (ref 11.6–15.1)
GFR SERPL CREATININE-BSD FRML MDRD: 100 ML/MIN/1.73SQ M
GLUCOSE SERPL-MCNC: 86 MG/DL (ref 65–140)
HCT VFR BLD AUTO: 31.8 % (ref 36.5–49.3)
HGB BLD-MCNC: 9.5 G/DL (ref 12–17)
IMM GRANULOCYTES # BLD AUTO: 0.04 THOUSAND/UL (ref 0–0.2)
IMM GRANULOCYTES NFR BLD AUTO: 1 % (ref 0–2)
LYMPHOCYTES # BLD AUTO: 1.3 THOUSANDS/ΜL (ref 0.6–4.47)
LYMPHOCYTES NFR BLD AUTO: 19 % (ref 14–44)
MCH RBC QN AUTO: 19.1 PG (ref 26.8–34.3)
MCHC RBC AUTO-ENTMCNC: 29.9 G/DL (ref 31.4–37.4)
MCV RBC AUTO: 64 FL (ref 82–98)
MONOCYTES # BLD AUTO: 1.06 THOUSAND/ΜL (ref 0.17–1.22)
MONOCYTES NFR BLD AUTO: 15 % (ref 4–12)
NEUTROPHILS # BLD AUTO: 4.28 THOUSANDS/ΜL (ref 1.85–7.62)
NEUTS SEG NFR BLD AUTO: 60 % (ref 43–75)
NRBC BLD AUTO-RTO: 0 /100 WBCS
PLATELET # BLD AUTO: 278 THOUSANDS/UL (ref 149–390)
PMV BLD AUTO: 8.4 FL (ref 8.9–12.7)
POTASSIUM SERPL-SCNC: 3.5 MMOL/L (ref 3.5–5.3)
RBC # BLD AUTO: 4.98 MILLION/UL (ref 3.88–5.62)
SALMONELLA DNA SPEC QL NAA+PROBE: NORMAL
SHIGA TOXIN STX GENE SPEC NAA+PROBE: NORMAL
SHIGELLA DNA SPEC QL NAA+PROBE: NORMAL
SODIUM SERPL-SCNC: 137 MMOL/L (ref 136–145)
VANCOMYCIN TROUGH SERPL-MCNC: 14.4 UG/ML (ref 10–20)
WBC # BLD AUTO: 7.02 THOUSAND/UL (ref 4.31–10.16)

## 2022-02-26 PROCEDURE — 80048 BASIC METABOLIC PNL TOTAL CA: CPT | Performed by: INTERNAL MEDICINE

## 2022-02-26 PROCEDURE — 85025 COMPLETE CBC W/AUTO DIFF WBC: CPT | Performed by: INTERNAL MEDICINE

## 2022-02-26 PROCEDURE — 80202 ASSAY OF VANCOMYCIN: CPT | Performed by: INTERNAL MEDICINE

## 2022-02-26 PROCEDURE — 99232 SBSQ HOSP IP/OBS MODERATE 35: CPT | Performed by: INTERNAL MEDICINE

## 2022-02-26 RX ADMIN — VANCOMYCIN HYDROCHLORIDE 1750 MG: 10 INJECTION, POWDER, LYOPHILIZED, FOR SOLUTION INTRAVENOUS at 04:50

## 2022-02-26 RX ADMIN — HYDROMORPHONE HYDROCHLORIDE 1 MG: 1 INJECTION, SOLUTION INTRAMUSCULAR; INTRAVENOUS; SUBCUTANEOUS at 21:33

## 2022-02-26 RX ADMIN — PROMETHAZINE HYDROCHLORIDE 25 MG: 25 INJECTION INTRAMUSCULAR; INTRAVENOUS at 13:11

## 2022-02-26 RX ADMIN — SODIUM CHLORIDE, SODIUM LACTATE, POTASSIUM CHLORIDE, AND CALCIUM CHLORIDE 75 ML/HR: .6; .31; .03; .02 INJECTION, SOLUTION INTRAVENOUS at 21:43

## 2022-02-26 RX ADMIN — VANCOMYCIN HYDROCHLORIDE 1750 MG: 10 INJECTION, POWDER, LYOPHILIZED, FOR SOLUTION INTRAVENOUS at 16:54

## 2022-02-26 RX ADMIN — METRONIDAZOLE 500 MG: 500 INJECTION, SOLUTION INTRAVENOUS at 11:41

## 2022-02-26 RX ADMIN — SODIUM CHLORIDE, SODIUM LACTATE, POTASSIUM CHLORIDE, AND CALCIUM CHLORIDE 100 ML/HR: .6; .31; .03; .02 INJECTION, SOLUTION INTRAVENOUS at 09:57

## 2022-02-26 RX ADMIN — HYDROMORPHONE HYDROCHLORIDE 1 MG: 1 INJECTION, SOLUTION INTRAMUSCULAR; INTRAVENOUS; SUBCUTANEOUS at 09:59

## 2022-02-26 RX ADMIN — DULOXETINE HYDROCHLORIDE 30 MG: 30 CAPSULE, DELAYED RELEASE ORAL at 09:43

## 2022-02-26 RX ADMIN — PROMETHAZINE HYDROCHLORIDE 25 MG: 25 INJECTION INTRAMUSCULAR; INTRAVENOUS at 21:34

## 2022-02-26 RX ADMIN — CIPROFLOXACIN 400 MG: 2 INJECTION, SOLUTION INTRAVENOUS at 09:46

## 2022-02-26 RX ADMIN — METRONIDAZOLE 500 MG: 500 INJECTION, SOLUTION INTRAVENOUS at 03:54

## 2022-02-26 RX ADMIN — HYDROMORPHONE HYDROCHLORIDE 1 MG: 1 INJECTION, SOLUTION INTRAMUSCULAR; INTRAVENOUS; SUBCUTANEOUS at 13:13

## 2022-02-26 RX ADMIN — HYDROMORPHONE HYDROCHLORIDE 1 MG: 1 INJECTION, SOLUTION INTRAMUSCULAR; INTRAVENOUS; SUBCUTANEOUS at 18:18

## 2022-02-26 NOTE — PROGRESS NOTES
[facility] Progress Note  Patient: Darrin Goodpasture 27 y o  male   MRN: [id number]  PCP: Ladonna Lozada DO  Unit/Bed#: [address] [place] [address] Encounter: [id number]  Date Of Visit: 02/26/22    Problem List:    Principal Problem:    Enteritis  Active Problems:    SIRS (systemic inflammatory response syndrome) (HCC)    Ankylosing spondylitis (HCC)    Positive blood cultures    CAR (generalized anxiety disorder)    Anemia      Assessment & Plan:    SIRS (systemic inflammatory response syndrome) (HCC)  Assessment & Plan  As evidenced by fever, tachycardia, leukocytosis  CRP mildly elevated   Likely secondary enteritis and dehydration , rule out bacteremia  Remains afebrile, leukocytosis improved  · Follow-up blood culture and sensitivities-currently growing coagulase-negative Staph and alpha hemolytic strep 2/2 ? Intra-abdominal versus Contaminant  · Follow-up Repeat blood cultures  · Trend CBC and fever  · Follow-up stool studies   · Continue antibiotics as below      * Enteritis  Assessment & Plan  Patient has history of UC post colectomy and ileal pouch formation, follows up at [address]   history of ankylosing spondylosis maintained on Imagene Button  Presented with abdominal pain two days ago with nausea and vomiting  CT with findings of enteritis at that time  Symptoms worsened prompting him to return to the ER  He reported 30 episodes of diarrhea which became bloody towards the ends  He reports chills, +fever in ER  CT of the A/P revealed no interval change, stable mild wall thickening of the distal ileum and pouch - may be nonspecific enteritis  Discussed with team at [address] on admission and they recommended no steroids, c  Diff testing and GI consult  Diarrhea continues to improve     Pain/discomfort is better  No further bleeding   Continue IV fluids, will decrease to 75 cc/hours   Follow-up stool studies -stool C diff negative    Follow-up bacterial PCR   GI following, input appreciated   Continue ciprofloxacin and metronidazole for now, will discontinue if bacterial PCR is negative    Continue diet as tolerated   Monitor intake output    Monitor abdominal exam   Symptomatic treatment   Follow-up blood cultures    Positive blood cultures  Assessment & Plan  Blood cultures obtained on admission 2/2 noted to be positive for coagulase-negative strep and alpha hemolytic strep? Enterococcus  Appears to have obtained from the same site and the time  · Continue vancomycin  · Follow up repeat blood cultures  · Monitor fever and CBC  · Follow-up culture sensitivities, may require further workup if positive for Enterococcus    Ankylosing spondylitis ([facility] [address] )  Assessment & Plan  On Xeljanz    Anemia  Assessment & Plan  microcytic  Downtrending during hospitalization likely dilutional with associated with mild GI loss  No evidence of acute overt bleeding at present  · Continue to monitor  · Iron sat 6%, will initiate iron on discharge      CAR (generalized anxiety disorder)  Assessment & Plan  Continue cymbalta           VTE Pharmacologic Prophylaxis:   Moderate Risk (Score 3-4) - Pharmacological DVT Prophylaxis Contraindicated  Sequential Compression Devices Ordered  Patient Centered Rounds: I performed bedside rounds with nursing staff today  Discussions with Specialists or Other Care Team Provider:  Yes discussed with microbiology  Education and Discussions with Family / Patient: Discussed with patient  Time Spent for Care: 45 minutes  More than 50% of total time spent on counseling and coordination of care as described above  Current Length of Stay: 3 day(s)  Current Patient Status: Inpatient   Certification Statement: The patient will continue to require additional inpatient hospital stay due to Enteritis  Discharge Plan: Anticipate discharge in 48-72 hrs to home      Code Status: Level 1 - Full Code    Subjective:   Overall feels better  Diarrhea is improving  Denies pain  Cramping is better  Denies any further nausea or hiccups  Denies any fever or chills    Tolerating low residue diet    Discuss regarding blood culture results showing coagulase-negative Staph but also shows alpha hemolytic strep    Objective:   Comfortably lying in bed  Vitals:   Temp (24hrs), Av 9 °F (36 6 °C), Min:97 5 °F (36 4 °C), Max:98 6 °F (37 °C)    Temp:  [97 5 °F (36 4 °C)-98 6 °F (37 °C)] 97 5 °F (36 4 °C)  HR:  [62-90] 62  Resp:  [16-18] 16  BP: (105-125)/(64-75) 105/64  SpO2:  [92 %-97 %] 97 % on room air  Body mass index is 27 23 kg/m²  Input and Output Summary (last 24 hours): Intake/Output Summary (Last 24 hours) at 2022 1105  Last data filed at 2022 0957  Gross per 24 hour   Intake 2836 67 ml   Output 240 ml   Net 2596 67 ml       Physical Exam:   Physical Exam  Constitutional:       General: He is not in acute distress  HENT:      Head: Normocephalic and atraumatic  Cardiovascular:      Rate and Rhythm: Normal rate  Pulmonary:      Effort: Pulmonary effort is normal  No respiratory distress  Breath sounds: No wheezing, rhonchi or rales  Chest:      Chest wall: No tenderness  Abdominal:      General: Bowel sounds are normal  There is no distension  Palpations: Abdomen is soft  Tenderness: There is no abdominal tenderness  There is no guarding or rebound  Musculoskeletal:      Cervical back: Neck supple  Right lower leg: No edema  Left lower leg: No edema  Skin:     General: Skin is warm and dry  Findings: No rash  Neurological:      Mental Status: He is alert  Mental status is at baseline  Cranial Nerves: No cranial nerve deficit           Additional Data:     Labs:  Results from last 7 days   Lab Units 22  0538   WBC Thousand/uL 7 02   HEMOGLOBIN g/dL 9 5*   HEMATOCRIT % 31 8*   PLATELETS Thousands/uL 278   NEUTROS PCT % 60   LYMPHS PCT % 19   MONOS PCT % 15*   EOS PCT % 4     Results from last 7 days   Lab Units 02/26/22  0538 02/25/22  0536 02/25/22  0536   SODIUM mmol/L 137   < > 134*   POTASSIUM mmol/L 3 5   < > 3 7   CHLORIDE mmol/L 102   < > 100   CO2 mmol/L 27   < > 29   BUN mg/dL 7   < > 9   CREATININE mg/dL 1 01   < > 1 09   ANION GAP mmol/L 8   < > 5   CALCIUM mg/dL 7 8*   < > 8 1*   ALBUMIN g/dL  --   --  3 0*   TOTAL BILIRUBIN mg/dL  --   --  0 76   ALK PHOS U/L  --   --  71   ALT U/L  --   --  29   AST U/L  --   --  19   GLUCOSE RANDOM mg/dL 86   < > 83    < > = values in this interval not displayed  Results from last 7 days   Lab Units 02/24/22  0734   POC GLUCOSE mg/dl 89         Results from last 7 days   Lab Units 02/23/22  2259   LACTIC ACID mmol/L 1 7       Lines/Drains:  Invasive Devices  Report    Peripheral Intravenous Line            Peripheral IV 02/23/22 Right Hand 2 days    Peripheral IV 02/25/22 Distal;Dorsal (posterior); Right Forearm <1 day                      Imaging: Reviewed radiology reports from this admission including: abdominal/pelvic CT    Recent Cultures (last 7 days):   Results from last 7 days   Lab Units 02/25/22  1130 02/25/22  1119 02/23/22  2259 02/23/22  2250 02/23/22  2205   BLOOD CULTURE  Received in Microbiology Lab  Culture in Progress  Received in Microbiology Lab  Culture in Progress   Alpha Hemolytic Streptococcus*  Staphylococcus coagulase negative* Alpha Hemolytic Streptococcus*  Staphylococcus coagulase negative*  --    GRAM STAIN RESULT   --   --  Gram positive cocci in chains*  Gram positive cocci in clusters* Gram positive cocci in chains*  Gram positive cocci in clusters*  --    C DIFF TOXIN B BY PCR   --   --   --   --  Negative       Last 24 Hours Medication List:   Current Facility-Administered Medications   Medication Dose Route Frequency Provider Last Rate    acetaminophen  650 mg Rectal Q4H PRN [place] Ziegler      chlorproMAZINE  25 mg Oral Q6H PRN Lam Harris PA-C      ciprofloxacin  400 mg Intravenous Q12H Lam Harris PA-C 400 mg (02/26/22 0946)    dicyclomine  10 mg Oral Q6H PRN Vinayak Nielson MD      DULoxetine  30 mg Oral Daily [place] Leal      HYDROmorphone  1 mg Intravenous Q3H PRN Vinayak Nielson MD      lactated ringers  100 mL/hr Intravenous Continuous Vinayak Nielson  mL/hr (02/26/22 0957)    metroNIDAZOLE  500 mg Intravenous Q8H Shruthi Boles PA-C 500 mg (02/26/22 0354)    promethazine  25 mg Intravenous Q6H PRN [place] Leal      vancomycin  20 mg/kg Intravenous Q12H Pedro Lealisas 4464 (02/26/22 7298)        Today, Patient Was Seen By: Vinayak Nielson MD    ** Please Note: "This note has been constructed using a voice recognition system  Therefore there may be syntax, spelling, and/or grammatical errors   Please call if you have any questions  "**

## 2022-02-26 NOTE — PLAN OF CARE
Problem: PAIN - ADULT  Goal: Verbalizes/displays adequate comfort level or baseline comfort level  Description: Interventions:  - Encourage patient to monitor pain and request assistance  - Assess pain using appropriate pain scale  - Administer analgesics based on type and severity of pain and evaluate response  - Implement non-pharmacological measures as appropriate and evaluate response  - Consider cultural and social influences on pain and pain management  - Notify physician/advanced practitioner if interventions unsuccessful or patient reports new pain  Outcome: Progressing     Problem: GASTROINTESTINAL - ADULT  Goal: Minimal or absence of nausea and/or vomiting  Description: INTERVENTIONS:  - Administer IV fluids if ordered to ensure adequate hydration  - Maintain NPO status until nausea and vomiting are resolved  - Administer ordered antiemetic medications as needed  - Provide nonpharmacologic comfort measures as appropriate  - Advance diet as tolerated, if ordered  - Consider nutrition services referral to assist patient with adequate nutrition and appropriate food choices  Outcome: Progressing  Goal: Maintains or returns to baseline bowel function  Description: INTERVENTIONS:  - Assess bowel function  - Encourage oral fluids to ensure adequate hydration  - Administer IV fluids if ordered to ensure adequate hydration  - Administer ordered medications as needed  - Encourage mobilization and activity  - Consider nutritional services referral to assist patient with adequate nutrition and appropriate food choices  Outcome: Progressing  Goal: Maintains adequate nutritional intake  Description: INTERVENTIONS:  - Monitor percentage of each meal consumed  - Identify factors contributing to decreased intake, treat as appropriate  - Assist with meals as needed  - Monitor I&O, weight, and lab values if indicated  - Obtain nutrition services referral as needed  Outcome: Progressing     Problem: Nutrition/Hydration-ADULT  Goal: Nutrient/Hydration intake appropriate for improving, restoring or maintaining nutritional needs  Description: Monitor and assess patient's nutrition/hydration status for malnutrition  Collaborate with interdisciplinary team and initiate plan and interventions as ordered  Monitor patient's weight and dietary intake as ordered or per policy  Utilize nutrition screening tool and intervene as necessary  Determine patient's food preferences and provide high-protein, high-caloric foods as appropriate       INTERVENTIONS:  - Monitor oral intake, urinary output, labs, and treatment plans  - Assess nutrition and hydration status and recommend course of action  - Evaluate amount of meals eaten  - Assist patient with eating if necessary   - Allow adequate time for meals  - Recommend/ encourage appropriate diets, oral nutritional supplements, and vitamin/mineral supplements  - Order, calculate, and assess calorie counts as needed  - Assess need for intravenous fluids  - Provide specific nutrition/hydration education as appropriate  - Include patient/family/caregiver in decisions related to nutrition  Outcome: Progressing     Problem: Potential for Falls  Goal: Patient will remain free of falls  Description: INTERVENTIONS:  - Educate patient/family on patient safety including physical limitations  - Instruct patient to call for assistance with activity   - Consult OT/PT to assist with strengthening/mobility   - Keep Call bell within reach  - Keep bed low and locked with side rails adjusted as appropriate  - Keep care items and personal belongings within reach  - Initiate and maintain comfort rounds  - Make Fall Risk Sign visible to staff  - Offer Toileting every 2 Hours, in advance of need  - Initiate/Maintain bed alarm  - Obtain necessary fall risk management equipment: alarms  - Apply yellow socks and bracelet for high fall risk patients  - Consider moving patient to room near nurses station  Outcome: Progressing

## 2022-02-26 NOTE — PLAN OF CARE
Problem: PAIN - ADULT  Goal: Verbalizes/displays adequate comfort level or baseline comfort level  Description: Interventions:  - Encourage patient to monitor pain and request assistance  - Assess pain using appropriate pain scale  - Administer analgesics based on type and severity of pain and evaluate response  - Implement non-pharmacological measures as appropriate and evaluate response  - Consider cultural and social influences on pain and pain management  - Notify physician/advanced practitioner if interventions unsuccessful or patient reports new pain  Outcome: Progressing     Problem: GASTROINTESTINAL - ADULT  Goal: Minimal or absence of nausea and/or vomiting  Description: INTERVENTIONS:  - Administer IV fluids if ordered to ensure adequate hydration  - Maintain NPO status until nausea and vomiting are resolved  - Administer ordered antiemetic medications as needed  - Provide nonpharmacologic comfort measures as appropriate  - Advance diet as tolerated, if ordered  - Consider nutrition services referral to assist patient with adequate nutrition and appropriate food choices  Outcome: Progressing  Goal: Maintains or returns to baseline bowel function  Description: INTERVENTIONS:  - Assess bowel function  - Encourage oral fluids to ensure adequate hydration  - Administer IV fluids if ordered to ensure adequate hydration  - Administer ordered medications as needed  - Encourage mobilization and activity  - Consider nutritional services referral to assist patient with adequate nutrition and appropriate food choices  Outcome: Progressing  Goal: Maintains adequate nutritional intake  Description: INTERVENTIONS:  - Monitor percentage of each meal consumed  - Identify factors contributing to decreased intake, treat as appropriate  - Assist with meals as needed  - Monitor I&O, weight, and lab values if indicated  - Obtain nutrition services referral as needed  Outcome: Progressing     Problem: Nutrition/Hydration-ADULT  Goal: Nutrient/Hydration intake appropriate for improving, restoring or maintaining nutritional needs  Description: Monitor and assess patient's nutrition/hydration status for malnutrition  Collaborate with interdisciplinary team and initiate plan and interventions as ordered  Monitor patient's weight and dietary intake as ordered or per policy  Utilize nutrition screening tool and intervene as necessary  Determine patient's food preferences and provide high-protein, high-caloric foods as appropriate       INTERVENTIONS:  - Monitor oral intake, urinary output, labs, and treatment plans  - Assess nutrition and hydration status and recommend course of action  - Evaluate amount of meals eaten  - Assist patient with eating if necessary   - Allow adequate time for meals  - Recommend/ encourage appropriate diets, oral nutritional supplements, and vitamin/mineral supplements  - Order, calculate, and assess calorie counts as needed  - Assess need for intravenous fluids  - Provide specific nutrition/hydration education as appropriate  - Include patient/family/caregiver in decisions related to nutrition  Outcome: Progressing     Problem: Potential for Falls  Goal: Patient will remain free of falls  Description: INTERVENTIONS:  - Educate patient/family on patient safety including physical limitations  - Instruct patient to call for assistance with activity   - Consult OT/PT to assist with strengthening/mobility   - Keep Call bell within reach  - Keep bed low and locked with side rails adjusted as appropriate  - Keep care items and personal belongings within reach  - Initiate and maintain comfort rounds  - Make Fall Risk Sign visible to staff  - Offer Toileting every 2 Hours, in advance of need  - Apply yellow socks and bracelet for high fall risk patients  - Consider moving patient to room near nurses station  Outcome: Progressing

## 2022-02-26 NOTE — PROGRESS NOTES
Progress Note - Marharrisona Comfort 27 y o  male MRN: [id number]    Unit/Bed#: 2 [address] [address] Encounter: [id number]      Subjective:   Very pleasant gentleman longstanding IBD followed at [place]  Status post colectomy with pouch  Admitted with suspected enteritis  Blood cultures positive question contaminant  Patient markedly improved on Cipro Flagyl  No more blood  His Tamara Torreon has been held  Usually followed in [place]  Abdominal pain likewise improved  Objective:     Vitals: Blood pressure 105/64, pulse 62, temperature 97 5 °F (36 4 °C), temperature source Oral, resp  rate 16, height 5' 9" (1 753 m), weight 83 6 kg (184 lb 6 4 oz), SpO2 97 %  ,Body mass index is 27 23 kg/m²  Intake/Output Summary (Last 24 hours) at 2/26/2022 1455  Last data filed at 2/26/2022 1154  Gross per 24 hour   Intake 3594 67 ml   Output 240 ml   Net 3354 67 ml       Physical Exam: HEENT- unremarkable                              Neck- supple without thyromegally                             Cardiac- RRR, no MGRC                             Lungs- CTA                             Abd- benign                             Ext- no CCE                             Neuro- grossly intact    Invasive Devices  Report    Peripheral Intravenous Line            Peripheral IV 02/23/22 Right Hand 2 days    Peripheral IV 02/25/22 Distal;Dorsal (posterior); Right Forearm <1 day                  Lab:    Recent Results (from the past 24 hour(s))   CBC and differential    Collection Time: 02/26/22  5:38 AM   Result Value Ref Range    WBC 7 02 4 31 - 10 16 Thousand/uL    RBC 4 98 3 88 - 5 62 Million/uL    Hemoglobin 9 5 (L) 12 0 - 17 0 g/dL    Hematocrit 31 8 (L) 36 5 - 49 3 %    MCV 64 (L) 82 - 98 fL    MCH 19 1 (L) 26 8 - 34 3 pg    MCHC 29 9 (L) 31 4 - 37 4 g/dL    RDW 17 3 (H) 11 6 - 15 1 %    MPV 8 4 (L) 8 9 - 12 7 fL    Platelets 926 400 - 487 Thousands/uL    nRBC 0 /100 WBCs    Neutrophils Relative 60 43 - 75 %    Immat GRANS % 1 0 - 2 % Lymphocytes Relative 19 14 - 44 %    Monocytes Relative 15 (H) 4 - 12 %    Eosinophils Relative 4 0 - 6 %    Basophils Relative 1 0 - 1 %    Neutrophils Absolute 4 28 1 85 - 7 62 Thousands/µL    Immature Grans Absolute 0 04 0 00 - 0 20 Thousand/uL    Lymphocytes Absolute 1 30 0 60 - 4 47 Thousands/µL    Monocytes Absolute 1 06 0 17 - 1 22 Thousand/µL    Eosinophils Absolute 0 29 0 00 - 0 61 Thousand/µL    Basophils Absolute 0 05 0 00 - 0 10 Thousands/µL   Basic metabolic panel    Collection Time: 02/26/22  5:38 AM   Result Value Ref Range    Sodium 137 136 - 145 mmol/L    Potassium 3 5 3 5 - 5 3 mmol/L    Chloride 102 100 - 108 mmol/L    CO2 27 21 - 32 mmol/L    ANION GAP 8 4 - 13 mmol/L    BUN 7 5 - 25 mg/dL    Creatinine 1 01 0 60 - 1 30 mg/dL    Glucose 86 65 - 140 mg/dL    Calcium 7 8 (L) 8 3 - 10 1 mg/dL    eGFR 100 ml/min/1 73sq m       Imaging Studies:  CT abdomen pelvis with contrast    Result Date: 2/24/2022  Narrative: CT ABDOMEN AND PELVIS WITH IV CONTRAST INDICATION:   Sepsis  COMPARISON:  CT of abdomen pelvis on February 21, 2022  TECHNIQUE:  CT examination of the abdomen and pelvis was performed  Axial, sagittal, and coronal 2D reformatted images were created from the source data and submitted for interpretation  Radiation dose length product (DLP) for this visit:  756 79 mGy-cm   This examination, like all CT scans performed in the [facility], was performed utilizing techniques to minimize radiation dose exposure, including the use of iterative  reconstruction and automated exposure control  IV Contrast:  100 mL of iohexol (OMNIPAQUE) Enteric Contrast:  Enteric contrast was not administered  FINDINGS: ABDOMEN LOWER CHEST:  No clinically significant abnormality identified in the visualized lower chest  LIVER/BILIARY TREE:  Unremarkable  GALLBLADDER:  No calcified gallstones  No pericholecystic inflammatory change  SPLEEN:  Unremarkable  PANCREAS:  Unremarkable   ADRENAL GLANDS: Unremarkable  KIDNEYS/URETERS:  Unremarkable  No hydronephrosis  STOMACH AND BOWEL:  Status post colectomy and ileal pouch anal anastomosis  Stable mild wall thickening of the distal ileum and pouch  No bowel obstruction  Stable distention of the rectal pouch  Stable gaseous distention of a loop of bowel in the upper abdomen  APPENDIX:  Absent ABDOMINOPELVIC CAVITY:  No ascites  No pneumoperitoneum  Stable enlarged right external iliac lymph node measuring 1 5 cm in short axis (series 2, image 69)  Stable right para-aortic lymph noted measuring 1 2 cm in short axis (series 2, and 49) VESSELS:  Unremarkable for patient's age  PELVIS REPRODUCTIVE ORGANS:  Unremarkable for patient's age  URINARY BLADDER:  Unremarkable  ABDOMINAL WALL/INGUINAL REGIONS:  Unremarkable  OSSEOUS STRUCTURES:  No acute fracture or destructive osseous lesion  Impression: No significant interval change  Stable mild wall thickening of the distal ileum and pouch which may be due to nonspecific enteritis  Workstation performed: [id number]     CT abdomen pelvis w contrast    Result Date: 2/21/2022  Narrative: CT ABDOMEN AND PELVIS WITH IV CONTRAST INDICATION:   llq pain history of crohn's/pancolitis  COMPARISON:  January 2, 2022 TECHNIQUE:  CT examination of the abdomen and pelvis was performed  Axial, sagittal, and coronal 2D reformatted images were created from the source data and submitted for interpretation  Radiation dose length product (DLP) for this visit:  170 99 mGy-cm   This examination, like all CT scans performed in the [facility], was performed utilizing techniques to minimize radiation dose exposure, including the use of iterative  reconstruction and automated exposure control  IV Contrast:  100 mL of iohexol (OMNIPAQUE) Enteric Contrast:  Enteric contrast was not administered   FINDINGS: ABDOMEN LOWER CHEST:  No clinically significant abnormality identified in the visualized lower chest  LIVER/BILIARY TREE:  Unremarkable  GALLBLADDER:  Contracted  No calcified stones  SPLEEN:  Unremarkable  PANCREAS:  Unremarkable  ADRENAL GLANDS:  Unremarkable  KIDNEYS/URETERS:  Unremarkable  No hydronephrosis  STOMACH AND BOWEL: As stated previously: Patient is status post colectomy and ileal pouch-anal anastomosis  There is no evidence of obstruction  The previous mild bowel wall thickening and mucosal hyperemia in right lower quadrant small bowel creating the ileal pouch persists, (currently best seen on image 2/61-2/79)  Most likely chronic inflammatory enteritis  APPENDIX:  Removed ABDOMINOPELVIC CAVITY:  Stable 1 2 cm aortocaval node (image 2/50) and a stable 1 5 cm right external iliac node  (Image 2/68)  Both have demonstrated long-term stability  Trace fluid in the right pelvis in the obturator region  No free air  VESSELS:  Unremarkable for patient's age  PELVIS REPRODUCTIVE ORGANS:  Unremarkable for patient's age  URINARY BLADDER:  Unremarkable  ABDOMINAL WALL/INGUINAL REGIONS:  Unremarkable  OSSEOUS STRUCTURES:  No acute fracture or destructive osseous lesion  Impression: Patient is status post colectomy and ileal pouch-anal anastomosis  Similar appearance compared from prior exam with persistent mild small bowel wall thickening involving the distal small bowel creating the pouch, but with less pronounced mucosal enhancement  This likely represents chronic inflammatory enteritis  No obstruction or perforation  Trace amounts of pelvic fluid is seen in the right obturator location  Persistent aortocaval and right external iliac adenopathy, which have demonstrated long-term stability  Workstation performed: [id number]         Assessment:  Suspected enteritis  Yonas Luna has been held  On Cipro Flagyl  Doing better  Blood cultures will be repeated  Waiting results  Plan:  Continue current management  Await blood culture results    Should consider endoscopic evaluation of Pouch etc   The patient desires to have this done in [place] with his gastroenterologist

## 2022-02-27 LAB
ANION GAP SERPL CALCULATED.3IONS-SCNC: 7 MMOL/L (ref 4–13)
BACTERIA BLD CULT: ABNORMAL
BASOPHILS # BLD AUTO: 0.08 THOUSANDS/ΜL (ref 0–0.1)
BASOPHILS NFR BLD AUTO: 1 % (ref 0–1)
BUN SERPL-MCNC: 5 MG/DL (ref 5–25)
CALCIUM SERPL-MCNC: 7.7 MG/DL (ref 8.3–10.1)
CHLORIDE SERPL-SCNC: 105 MMOL/L (ref 100–108)
CO2 SERPL-SCNC: 27 MMOL/L (ref 21–32)
CREAT SERPL-MCNC: 1.03 MG/DL (ref 0.6–1.3)
EOSINOPHIL # BLD AUTO: 0.26 THOUSAND/ΜL (ref 0–0.61)
EOSINOPHIL NFR BLD AUTO: 3 % (ref 0–6)
ERYTHROCYTE [DISTWIDTH] IN BLOOD BY AUTOMATED COUNT: 17.1 % (ref 11.6–15.1)
GFR SERPL CREATININE-BSD FRML MDRD: 98 ML/MIN/1.73SQ M
GLUCOSE SERPL-MCNC: 85 MG/DL (ref 65–140)
GRAM STN SPEC: ABNORMAL
HCT VFR BLD AUTO: 31.2 % (ref 36.5–49.3)
HGB BLD-MCNC: 9.3 G/DL (ref 12–17)
IMM GRANULOCYTES # BLD AUTO: 0.04 THOUSAND/UL (ref 0–0.2)
IMM GRANULOCYTES NFR BLD AUTO: 1 % (ref 0–2)
LYMPHOCYTES # BLD AUTO: 1.42 THOUSANDS/ΜL (ref 0.6–4.47)
LYMPHOCYTES NFR BLD AUTO: 16 % (ref 14–44)
MCH RBC QN AUTO: 19.3 PG (ref 26.8–34.3)
MCHC RBC AUTO-ENTMCNC: 29.8 G/DL (ref 31.4–37.4)
MCV RBC AUTO: 65 FL (ref 82–98)
MONOCYTES # BLD AUTO: 0.91 THOUSAND/ΜL (ref 0.17–1.22)
MONOCYTES NFR BLD AUTO: 11 % (ref 4–12)
NEUTROPHILS # BLD AUTO: 5.95 THOUSANDS/ΜL (ref 1.85–7.62)
NEUTS SEG NFR BLD AUTO: 68 % (ref 43–75)
NRBC BLD AUTO-RTO: 0 /100 WBCS
PLATELET # BLD AUTO: 307 THOUSANDS/UL (ref 149–390)
PMV BLD AUTO: 8.5 FL (ref 8.9–12.7)
POTASSIUM SERPL-SCNC: 3.7 MMOL/L (ref 3.5–5.3)
RBC # BLD AUTO: 4.83 MILLION/UL (ref 3.88–5.62)
SODIUM SERPL-SCNC: 139 MMOL/L (ref 136–145)
WBC # BLD AUTO: 8.66 THOUSAND/UL (ref 4.31–10.16)
WBC SPEC QL GRAM STN: NORMAL

## 2022-02-27 PROCEDURE — 80048 BASIC METABOLIC PNL TOTAL CA: CPT | Performed by: INTERNAL MEDICINE

## 2022-02-27 PROCEDURE — 85025 COMPLETE CBC W/AUTO DIFF WBC: CPT | Performed by: INTERNAL MEDICINE

## 2022-02-27 PROCEDURE — 99232 SBSQ HOSP IP/OBS MODERATE 35: CPT | Performed by: INTERNAL MEDICINE

## 2022-02-27 RX ADMIN — HYDROMORPHONE HYDROCHLORIDE 1 MG: 1 INJECTION, SOLUTION INTRAMUSCULAR; INTRAVENOUS; SUBCUTANEOUS at 10:59

## 2022-02-27 RX ADMIN — HYDROMORPHONE HYDROCHLORIDE 1 MG: 1 INJECTION, SOLUTION INTRAMUSCULAR; INTRAVENOUS; SUBCUTANEOUS at 15:13

## 2022-02-27 RX ADMIN — HYDROMORPHONE HYDROCHLORIDE 1 MG: 1 INJECTION, SOLUTION INTRAMUSCULAR; INTRAVENOUS; SUBCUTANEOUS at 19:47

## 2022-02-27 RX ADMIN — VANCOMYCIN HYDROCHLORIDE 2000 MG: 10 INJECTION, POWDER, LYOPHILIZED, FOR SOLUTION INTRAVENOUS at 15:14

## 2022-02-27 RX ADMIN — HYDROMORPHONE HYDROCHLORIDE 1 MG: 1 INJECTION, SOLUTION INTRAMUSCULAR; INTRAVENOUS; SUBCUTANEOUS at 04:09

## 2022-02-27 RX ADMIN — PROMETHAZINE HYDROCHLORIDE 25 MG: 25 INJECTION INTRAMUSCULAR; INTRAVENOUS at 19:47

## 2022-02-27 RX ADMIN — VANCOMYCIN HYDROCHLORIDE 2000 MG: 10 INJECTION, POWDER, LYOPHILIZED, FOR SOLUTION INTRAVENOUS at 04:10

## 2022-02-27 RX ADMIN — PROMETHAZINE HYDROCHLORIDE 25 MG: 25 INJECTION INTRAMUSCULAR; INTRAVENOUS at 10:59

## 2022-02-27 RX ADMIN — SODIUM CHLORIDE, SODIUM LACTATE, POTASSIUM CHLORIDE, AND CALCIUM CHLORIDE 75 ML/HR: .6; .31; .03; .02 INJECTION, SOLUTION INTRAVENOUS at 09:01

## 2022-02-27 RX ADMIN — HYDROMORPHONE HYDROCHLORIDE 1 MG: 1 INJECTION, SOLUTION INTRAMUSCULAR; INTRAVENOUS; SUBCUTANEOUS at 23:28

## 2022-02-27 RX ADMIN — CHLORPROMAZINE HYDROCHLORIDE 25 MG: 25 TABLET, FILM COATED ORAL at 15:20

## 2022-02-27 RX ADMIN — HYDROMORPHONE HYDROCHLORIDE 1 MG: 1 INJECTION, SOLUTION INTRAMUSCULAR; INTRAVENOUS; SUBCUTANEOUS at 00:57

## 2022-02-27 RX ADMIN — DULOXETINE HYDROCHLORIDE 30 MG: 30 CAPSULE, DELAYED RELEASE ORAL at 09:01

## 2022-02-27 NOTE — PLAN OF CARE
Problem: PAIN - ADULT  Goal: Verbalizes/displays adequate comfort level or baseline comfort level  Description: Interventions:  - Encourage patient to monitor pain and request assistance  - Assess pain using appropriate pain scale  - Administer analgesics based on type and severity of pain and evaluate response  - Implement non-pharmacological measures as appropriate and evaluate response  - Consider cultural and social influences on pain and pain management  - Notify physician/advanced practitioner if interventions unsuccessful or patient reports new pain  Outcome: Progressing     Problem: GASTROINTESTINAL - ADULT  Goal: Minimal or absence of nausea and/or vomiting  Description: INTERVENTIONS:  - Administer IV fluids if ordered to ensure adequate hydration  - Maintain NPO status until nausea and vomiting are resolved  - Administer ordered antiemetic medications as needed  - Provide nonpharmacologic comfort measures as appropriate  - Advance diet as tolerated, if ordered  - Consider nutrition services referral to assist patient with adequate nutrition and appropriate food choices  Outcome: Progressing  Goal: Maintains or returns to baseline bowel function  Description: INTERVENTIONS:  - Assess bowel function  - Encourage oral fluids to ensure adequate hydration  - Administer IV fluids if ordered to ensure adequate hydration  - Administer ordered medications as needed  - Encourage mobilization and activity  - Consider nutritional services referral to assist patient with adequate nutrition and appropriate food choices  Outcome: Progressing  Goal: Maintains adequate nutritional intake  Description: INTERVENTIONS:  - Monitor percentage of each meal consumed  - Identify factors contributing to decreased intake, treat as appropriate  - Assist with meals as needed  - Monitor I&O, weight, and lab values if indicated  - Obtain nutrition services referral as needed  Outcome: Progressing     Problem: Nutrition/Hydration-ADULT  Goal: Nutrient/Hydration intake appropriate for improving, restoring or maintaining nutritional needs  Description: Monitor and assess patient's nutrition/hydration status for malnutrition  Collaborate with interdisciplinary team and initiate plan and interventions as ordered  Monitor patient's weight and dietary intake as ordered or per policy  Utilize nutrition screening tool and intervene as necessary  Determine patient's food preferences and provide high-protein, high-caloric foods as appropriate       INTERVENTIONS:  - Monitor oral intake, urinary output, labs, and treatment plans  - Assess nutrition and hydration status and recommend course of action  - Evaluate amount of meals eaten  - Assist patient with eating if necessary   - Allow adequate time for meals  - Recommend/ encourage appropriate diets, oral nutritional supplements, and vitamin/mineral supplements  - Order, calculate, and assess calorie counts as needed  - Assess need for intravenous fluids  - Provide specific nutrition/hydration education as appropriate  - Include patient/family/caregiver in decisions related to nutrition  Outcome: Progressing     Problem: Potential for Falls  Goal: Patient will remain free of falls  Description: INTERVENTIONS:  - Educate patient/family on patient safety including physical limitations  - Instruct patient to call for assistance with activity   - Consult OT/PT to assist with strengthening/mobility   - Keep Call bell within reach  - Keep bed low and locked with side rails adjusted as appropriate  - Keep care items and personal belongings within reach  - Initiate and maintain comfort rounds  - Make Fall Risk Sign visible to staff  - Offer Toileting every 2 hrs, in advance of need  - I  - Obtain necessary fall risk management equipment:   - Apply yellow socks and bracelet for high fall risk patients  - Consider moving patient to room near nurses station  Outcome: Progressing

## 2022-02-27 NOTE — PROGRESS NOTES
Vancomycin IV Pharmacy-to-Dose Consultation    Jocelyne Sofia is a 27 y o  male who is currently receiving Vancomycin IV with management by the Pharmacy Consult service  Assessment/Plan:  The patient was reviewed  Renal function is stable and no signs or symptoms of nephrotoxicity and/or infusion reactions were documented in the chart  Based on todays assessment, continue current vancomycin (day # 3) dosing of 2000 mg IV q12h, with a plan for trough to be drawn at 1530 on 2/28/22  We will continue to follow the patients culture results and clinical progress daily      Deepak King, Pharmacist

## 2022-02-27 NOTE — PROGRESS NOTES
Vancomycin Assessment    Larry Howard is a 27 y o  male who is currently receiving vancomycin 1750 mg IV q12h for bacteremia     Relevant clinical data and objective history reviewed:  Creatinine   Date Value Ref Range Status   02/26/2022 1 01 0 60 - 1 30 mg/dL Final     Comment:     Standardized to IDMS reference method   02/25/2022 1 09 0 60 - 1 30 mg/dL Final     Comment:     Standardized to IDMS reference method   02/24/2022 1 25 0 60 - 1 30 mg/dL Final     Comment:     Standardized to IDMS reference method   10/31/2015 0 9 0 6 - 1 3 mg/dL    10/30/2015 1 0 0 6 - 1 3 mg/dL      /66   Pulse 81   Temp 97 5 °F (36 4 °C)   Resp 20   Ht 5' 9" (1 753 m)   Wt 83 6 kg (184 lb 6 4 oz)   SpO2 95%   BMI 27 23 kg/m²   I/O last 3 completed shifts: In: 3734 7 [P O :360; I V :3374 7]  Out: 240 [Urine:240]  Lab Results   Component Value Date/Time    BUN 7 02/26/2022 05:38 AM    BUN 7 10/26/2018 07:34 AM    WBC 7 02 02/26/2022 05:38 AM    WBC 9 0 10/31/2015 06:00 AM    HGB 9 5 (L) 02/26/2022 05:38 AM    HGB 10 5 (L) 10/31/2015 06:00 AM    HCT 31 8 (L) 02/26/2022 05:38 AM    HCT 34 2 (L) 10/31/2015 06:00 AM    MCV 64 (L) 02/26/2022 05:38 AM    MCV 61 3 (L) 10/31/2015 06:00 AM     02/26/2022 05:38 AM     10/31/2015 06:00 AM     Temp Readings from Last 3 Encounters:   02/26/22 97 5 °F (36 4 °C)   02/21/22 97 9 °F (36 6 °C) (Oral)   01/04/22 97 6 °F (36 4 °C)     Vancomycin Days of Therapy: 2    Assessment/Plan  The patient is currently on vancomycin utilizing scheduled dosing  The patient is receiving 1750 mg IV q12h with the most recent vancomycin level being at steady-state and sub-therapeutic based on a goal of 15-20 (appropriate for most indications) ; therefore, after clinical evaluation will be changed to 2000 mg IV q12h   Pharmacy will continue to follow closely for s/sx of nephrotoxicity, infusion reactions, and appropriateness of therapy  BMP and CBC will be ordered per protocol    Plan for trough as patient approaches steady state, prior to the 4th  dose at approximately 1530 on 2/28/22  Pharmacy will continue to follow the patients culture results and clinical progress daily      Rosalba Chadwick, Pharmacist

## 2022-02-27 NOTE — PROGRESS NOTES
[facility] Progress Note  Patient: Gabe Sierra 27 y o  male   MRN: [id number]  PCP: Riley Schroeder DO  Unit/Bed#: 2 [place] [address] Encounter: [id number]  Date Of Visit: 02/27/22    Problem List:    Principal Problem:    Enteritis  Active Problems:    SIRS (systemic inflammatory response syndrome) (HCC)    Ankylosing spondylitis ([address] )    Positive blood cultures    CAR (generalized anxiety disorder)    Anemia      Assessment & Plan:    SIRS (systemic inflammatory response syndrome) ([facility])  Assessment & Plan  As evidenced by fever, tachycardia, leukocytosis  CRP mildly elevated   Likely secondary enteritis and dehydration , rule out bacteremia  Remains afebrile, leukocytosis improved  · Follow-up blood culture and sensitivities  · Follow-up Repeat blood cultures-negative so far  · Trend CBC and fever  · Follow-up stool studies   · Continue antibiotics as below      * Enteritis  Assessment & Plan  Patient has history of UC post colectomy and ileal pouch formation, follows up at [address]   history of ankylosing spondylosis maintained on Samira Mettle  Presented with abdominal pain two days ago with nausea and vomiting  CT with findings of enteritis at that time  Symptoms worsened prompting him to return to the ER  He reported 30 episodes of diarrhea which became bloody towards the ends  He reports chills, +fever in ER  CT of the A/P revealed no interval change, stable mild wall thickening of the distal ileum and pouch - may be nonspecific enteritis  Discussed with team at [address] on admission and they recommended no steroids, c  Diff testing and GI consult  Diarrhea frequency overall improved  Pain/discomfort is still intermittent but better  No further bleeding  Tolerating diet   Monitor off IV fluids   stool studies -stool C diff negative    Follow-up bacterial PCR-negative   GI following, input appreciated   Initially treated with ciprofloxacin and metronidazole-now discontinued   Continue diet as tolerated   Monitor intake output    Monitor abdominal exam   Symptomatic treatment   Patient will require endoscopic evaluation of the pouch at some point    Positive blood cultures  Assessment & Plan  Blood cultures obtained on admission 2/2 noted to be positive for polymicrobial growth revealing Staphylococcus hominis, Streptococcus parasanguinis, Streptococcus mitis oralis, Streptococcus salivarius   Appears to have obtained from the same site and the time in,? Contaminant  · Continue vancomycin  · Follow-up culture sensitivity  · Follow up repeat blood cultures-remains negative so far  · Monitor fever and CBC  · ID evaluation      Ankylosing spondylitis ([facility] [address] )  Assessment & Plan  On Xeljanz    Anemia  Assessment & Plan  microcytic  Downtrending during hospitalization likely dilutional with associated with mild GI loss  No evidence of acute overt bleeding at present  · Continue to monitor  · Iron sat 6%, will initiate iron on discharge      CAR (generalized anxiety disorder)  Assessment & Plan  Continue cymbalta           VTE Pharmacologic Prophylaxis:   Moderate Risk (Score 3-4) - Pharmacological DVT Prophylaxis Contraindicated  Sequential Compression Devices Ordered  Patient Centered Rounds: I performed bedside rounds with nursing staff today  Discussions with Specialists or Other Care Team Provider:  Yes     Education and Discussions with Family / Patient: Discussed with patient  Time Spent for Care: 45 minutes  More than 50% of total time spent on counseling and coordination of care as described above  Current Length of Stay: 4 day(s)  Current Patient Status: Inpatient   Certification Statement: The patient will continue to require additional inpatient hospital stay due to Enteritis  Discharge Plan: Anticipate discharge in 48-72 hrs to home  Code Status: Level 1 - Full Code    Subjective:    Intermittent abdominal discomfort,    Mild nausea but tolerating diet    Loose bowel movement which he has chronically  Denies any bleeding    Denies any fever chills    Objective:   Comfortably lying in bed  Vitals:   Temp (24hrs), Av 4 °F (36 3 °C), Min:97 3 °F (36 3 °C), Max:97 5 °F (36 4 °C)    Temp:  [97 3 °F (36 3 °C)-97 5 °F (36 4 °C)] 97 3 °F (36 3 °C)  HR:  [62-89] 81  Resp:  [16-20] 20  BP: (105-123)/(64-76) 123/76  SpO2:  [94 %-97 %] 95 % on room air  Body mass index is 27 23 kg/m²  Input and Output Summary (last 24 hours): Intake/Output Summary (Last 24 hours) at 2022 0015  Last data filed at 2022 2140  Gross per 24 hour   Intake 2578 ml   Output 540 ml   Net 2038 ml       Physical Exam:   Physical Exam  Constitutional:       General: He is not in acute distress  HENT:      Head: Normocephalic and atraumatic  Cardiovascular:      Rate and Rhythm: Normal rate  Pulmonary:      Effort: Pulmonary effort is normal  No respiratory distress  Breath sounds: No wheezing, rhonchi or rales  Chest:      Chest wall: No tenderness  Abdominal:      General: Bowel sounds are normal  There is no distension  Palpations: Abdomen is soft  Tenderness: There is no abdominal tenderness  There is no guarding or rebound  Musculoskeletal:      Right lower leg: No edema  Left lower leg: No edema  Skin:     General: Skin is warm and dry  Findings: No rash  Neurological:      General: No focal deficit present  Mental Status: He is alert and oriented to person, place, and time  Mental status is at baseline  Cranial Nerves: No cranial nerve deficit     Psychiatric:         Mood and Affect: Mood normal          Additional Data:     Labs:  Results from last 7 days   Lab Units 22  0538   WBC Thousand/uL 7 02   HEMOGLOBIN g/dL 9 5*   HEMATOCRIT % 31 8*   PLATELETS Thousands/uL 278   NEUTROS PCT % 60   LYMPHS PCT % 19   MONOS PCT % 15*   EOS PCT % 4     Results from last 7 days   Lab Units 22  0538 22  0536 22  0536   SODIUM mmol/L 137 < > 134*   POTASSIUM mmol/L 3 5   < > 3 7   CHLORIDE mmol/L 102   < > 100   CO2 mmol/L 27   < > 29   BUN mg/dL 7   < > 9   CREATININE mg/dL 1 01   < > 1 09   ANION GAP mmol/L 8   < > 5   CALCIUM mg/dL 7 8*   < > 8 1*   ALBUMIN g/dL  --   --  3 0*   TOTAL BILIRUBIN mg/dL  --   --  0 76   ALK PHOS U/L  --   --  71   ALT U/L  --   --  29   AST U/L  --   --  19   GLUCOSE RANDOM mg/dL 86   < > 83    < > = values in this interval not displayed  Results from last 7 days   Lab Units 02/24/22  0734   POC GLUCOSE mg/dl 89         Results from last 7 days   Lab Units 02/23/22  2259   LACTIC ACID mmol/L 1 7       Lines/Drains:  Invasive Devices  Report    Peripheral Intravenous Line            Peripheral IV 02/23/22 Right Hand 3 days    Peripheral IV 02/25/22 Distal;Dorsal (posterior); Right Forearm 1 day                      Imaging: Reviewed radiology reports from this admission including: abdominal/pelvic CT    Recent Cultures (last 7 days):   Results from last 7 days   Lab Units 02/25/22  1130 02/25/22  1119 02/23/22  2259 02/23/22  2250 02/23/22  2205   BLOOD CULTURE  No Growth at 24 hrs  No Growth at 24 hrs   Streptococcus parasanguinis*  Staphylococcus hominis*  Streptococcus mitis oralis group* Streptococcus mitis oralis group*  Staphylococcus hominis*  Streptococcus salivarius*  --    GRAM STAIN RESULT   --   --  Gram positive cocci in chains*  Gram positive cocci in clusters* Gram positive cocci in chains*  Gram positive cocci in clusters*  --    C DIFF TOXIN B BY PCR   --   --   --   --  Negative       Last 24 Hours Medication List:   Current Facility-Administered Medications   Medication Dose Route Frequency Provider Last Rate    acetaminophen  650 mg Rectal Q4H PRN YK De La Cruz      chlorproMAZINE  25 mg Oral Q6H PRN Vita Naylor PA-C      dicyclomine  10 mg Oral Q6H PRN Zenobia Rasmussen MD      DULoxetine  30 mg Oral Daily [place] De La Cruz      HYDROmorphone  1 mg Intravenous Q3H PRN Janes Miller MD      lactated ringers  75 mL/hr Intravenous Continuous Janes Miller MD 75 mL/hr (02/26/22 2143)    promethazine  25 mg Intravenous Q6H PRN KY Hilton      vancomycin  2,000 mg Intravenous Q12H [place] Hilton          Today, Patient Was Seen By: Janes Miller MD    ** Please Note: "This note has been constructed using a voice recognition system  Therefore there may be syntax, spelling, and/or grammatical errors   Please call if you have any questions  "**

## 2022-02-28 ENCOUNTER — APPOINTMENT (INPATIENT)
Dept: RADIOLOGY | Facility: HOSPITAL | Age: 27
DRG: 394 | End: 2022-02-28
Payer: COMMERCIAL

## 2022-02-28 LAB
ANION GAP SERPL CALCULATED.3IONS-SCNC: 7 MMOL/L (ref 4–13)
BUN SERPL-MCNC: 6 MG/DL (ref 5–25)
CALCIUM SERPL-MCNC: 8 MG/DL (ref 8.3–10.1)
CHLORIDE SERPL-SCNC: 108 MMOL/L (ref 100–108)
CO2 SERPL-SCNC: 28 MMOL/L (ref 21–32)
CREAT SERPL-MCNC: 1.13 MG/DL (ref 0.6–1.3)
ERYTHROCYTE [DISTWIDTH] IN BLOOD BY AUTOMATED COUNT: 18 % (ref 11.6–15.1)
GFR SERPL CREATININE-BSD FRML MDRD: 87 ML/MIN/1.73SQ M
GLUCOSE SERPL-MCNC: 84 MG/DL (ref 65–140)
HCT VFR BLD AUTO: 36 % (ref 36.5–49.3)
HGB BLD-MCNC: 10.3 G/DL (ref 12–17)
MCH RBC QN AUTO: 18.7 PG (ref 26.8–34.3)
MCHC RBC AUTO-ENTMCNC: 28.6 G/DL (ref 31.4–37.4)
MCV RBC AUTO: 65 FL (ref 82–98)
PLATELET # BLD AUTO: 404 THOUSANDS/UL (ref 149–390)
PMV BLD AUTO: 8.7 FL (ref 8.9–12.7)
POTASSIUM SERPL-SCNC: 3.9 MMOL/L (ref 3.5–5.3)
RBC # BLD AUTO: 5.51 MILLION/UL (ref 3.88–5.62)
SODIUM SERPL-SCNC: 143 MMOL/L (ref 136–145)
WBC # BLD AUTO: 9.77 THOUSAND/UL (ref 4.31–10.16)

## 2022-02-28 PROCEDURE — 99232 SBSQ HOSP IP/OBS MODERATE 35: CPT | Performed by: INTERNAL MEDICINE

## 2022-02-28 PROCEDURE — 74022 RADEX COMPL AQT ABD SERIES: CPT

## 2022-02-28 PROCEDURE — 99223 1ST HOSP IP/OBS HIGH 75: CPT | Performed by: INTERNAL MEDICINE

## 2022-02-28 PROCEDURE — 80048 BASIC METABOLIC PNL TOTAL CA: CPT | Performed by: INTERNAL MEDICINE

## 2022-02-28 PROCEDURE — 85027 COMPLETE CBC AUTOMATED: CPT | Performed by: INTERNAL MEDICINE

## 2022-02-28 RX ORDER — MAGNESIUM CARB/ALUMINUM HYDROX 105-160MG
296 TABLET,CHEWABLE ORAL ONCE
Status: COMPLETED | OUTPATIENT
Start: 2022-02-28 | End: 2022-02-28

## 2022-02-28 RX ORDER — CEFAZOLIN SODIUM 2 G/50ML
2000 SOLUTION INTRAVENOUS EVERY 8 HOURS
Status: DISCONTINUED | OUTPATIENT
Start: 2022-02-28 | End: 2022-03-03 | Stop reason: HOSPADM

## 2022-02-28 RX ADMIN — HYDROMORPHONE HYDROCHLORIDE 1 MG: 1 INJECTION, SOLUTION INTRAMUSCULAR; INTRAVENOUS; SUBCUTANEOUS at 03:19

## 2022-02-28 RX ADMIN — CEFAZOLIN SODIUM 2000 MG: 2 SOLUTION INTRAVENOUS at 21:10

## 2022-02-28 RX ADMIN — HYDROMORPHONE HYDROCHLORIDE 1 MG: 1 INJECTION, SOLUTION INTRAMUSCULAR; INTRAVENOUS; SUBCUTANEOUS at 08:16

## 2022-02-28 RX ADMIN — VANCOMYCIN HYDROCHLORIDE 2000 MG: 10 INJECTION, POWDER, LYOPHILIZED, FOR SOLUTION INTRAVENOUS at 03:19

## 2022-02-28 RX ADMIN — HYDROMORPHONE HYDROCHLORIDE 1 MG: 1 INJECTION, SOLUTION INTRAMUSCULAR; INTRAVENOUS; SUBCUTANEOUS at 11:50

## 2022-02-28 RX ADMIN — MAGNESIUM CITRATE 296 ML: 1.75 LIQUID ORAL at 19:41

## 2022-02-28 RX ADMIN — HYDROMORPHONE HYDROCHLORIDE 1 MG: 1 INJECTION, SOLUTION INTRAMUSCULAR; INTRAVENOUS; SUBCUTANEOUS at 15:02

## 2022-02-28 RX ADMIN — CEFAZOLIN SODIUM 2000 MG: 2 SOLUTION INTRAVENOUS at 13:54

## 2022-02-28 RX ADMIN — HYDROMORPHONE HYDROCHLORIDE 1 MG: 1 INJECTION, SOLUTION INTRAMUSCULAR; INTRAVENOUS; SUBCUTANEOUS at 22:12

## 2022-02-28 RX ADMIN — HYDROMORPHONE HYDROCHLORIDE 1 MG: 1 INJECTION, SOLUTION INTRAMUSCULAR; INTRAVENOUS; SUBCUTANEOUS at 18:30

## 2022-02-28 RX ADMIN — PROMETHAZINE HYDROCHLORIDE 25 MG: 25 INJECTION INTRAMUSCULAR; INTRAVENOUS at 15:02

## 2022-02-28 RX ADMIN — DULOXETINE HYDROCHLORIDE 30 MG: 30 CAPSULE, DELAYED RELEASE ORAL at 08:16

## 2022-02-28 RX ADMIN — PROMETHAZINE HYDROCHLORIDE 25 MG: 25 INJECTION INTRAMUSCULAR; INTRAVENOUS at 22:12

## 2022-02-28 NOTE — CONSULTS
Consultation - Infectious Disease   Orprimitivolla  27 y o  male MRN: [id number]  Unit/Bed#: 2 [place] [address] Encounter: [id number]      IMPRESSION & RECOMMENDATIONS:   1  Sepsis  POA  With fever, tachycardia and leukocytosis  Admission blood cultures are now growing polymicrobic Streptococcus parasanguinous, strep mitis oralis, Streptococcus salivarius and Staph hominis  Acute enteritis possible source of #1/2  -Discontinue Vancomycin  -Switch to Cefazolin 2 g IV q 8 hours    -follow-up repeat cultures   -monitor temperature and hemodynamics  -serial exam  -recheck CBC and BMP in a m  2  Polymicrobic bacteremia  While staph hominis could represent skin contaminant, Streptococcus parasanguinous, strep mitis oralis, Streptococcus salivarius in blood cultures in setting of acute enteritis suggests bowel source of #1/2   02/25/2022 repeat blood cultures negative at 72 hrs   -antibiotics as above  -can place PICC line any time  -plan to complete 2 week IV antibiotic course through 3/9/22  -monitor temperature and hemodynamics  -serial exam  -recheck CBC and BMP   -script written for home infusion cefazolin, CBC with diff and creatinine next week and discontinue PICC line on 03/10/2022      3  Acute enteritis  In setting abdominal pain and patient presenting with alternating with constipation and bloody bowel  Patient with ulcerative colitis post colectomy with ileal pouch formation  Prior history of pouchitis in 2018     -monitor stool output  -antibiotics as above  -ongoing GI follow-up  -for lower endoscopy to evaluate J pouch tomorrow    4   Ankylosing spondylitis on Rashard Crutch outpatient biologic immunosuppression  -Rashard Crutch on hold in patient at present  -symptomatic management per primary care team    Antibiotics:  Vancomycin    I have discussed the above management plan in detail with patient, RN, and the primary service    Extensive review of the medical records in epic including review of the notes, radiographs, and laboratory results     HISTORY OF PRESENT ILLNESS:  Reason for Consult: 1  Positive blood cultures 2  Sepsis    HPI: Larry Howard is a 27y o  year old male with ulcerative colitis post colectomy and ileal pouch formation 1 5 years ago in [facility], ankylosing spondylitis maintained on Maryhelen Greaser for about 7 months who presented to the ER 2/23/22 with abdominal pain two days ago with nausea and vomiting  Outpatient 2/21/22 CT showed findings of enteritis at that time  Symptoms worsened patient of presentation prompting him to return to the ER  He reported 30 episodes of diarrhea, then no bowel movements followed by bloody bowel movement  He reported chills and had fever in the ER  Blood cultures were obtained  The blood cultures have grown a multitude of Gram-positive organisms  Patient was started on vancomycin and then repeat blood cultures were obtained  Infectious Disease is now being consulted regarding evaluation and management of positive blood cultures and sepsis  Patient does not feel great  He does not have a good appetite  The food stinks and he feels nauseous  He does not have any further chills  His abdomen feels uncomfortable, but no point abdominal tenderness at present  He has not had a bowel movement today  He denies any upper respiratory complaints or dysuria  His urine was dark but now has light and up to yellow  He reports he has been treated for pouchitis in 2018  REVIEW OF SYSTEMS:  A complete review of systems is negative other than that noted in the HPI      PAST MEDICAL HISTORY:  Past Medical History:   Diagnosis Date    [facility]ylosing spondylitis ([facility] [address] )     Anxiety     Bowel obstruction (HCC)     Clostridium difficile colitis 9/13/2018    Colitis     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[facility] [address] ) 9/13/2018    Pancreatitis     Ulcerative colitis ([facility] [address] )      Past Surgical History:   Procedure Laterality Date    COLECTOMY TOTAL      with ileal pouch and anastemosis    TOTAL COLECTOMY         FAMILY HISTORY:  Non-contributory    SOCIAL HISTORY:  Social History   Social History     Substance and Sexual Activity   Alcohol Use Yes    Comment: pt states 5 a month/socially     Social History     Substance and Sexual Activity   Drug Use No     Social History     Tobacco Use   Smoking Status Never Smoker   Smokeless Tobacco Never Used       ALLERGIES:  Allergies   Allergen Reactions    Mesalamine GI Intolerance     Other reaction(s): Pancreatitis  [facility] [id number]: pancreatitis       MEDICATIONS:  All current active medications have been reviewed  PHYSICAL EXAM:  Temp:  [97 5 °F (36 4 °C)-98 1 °F (36 7 °C)] 97 9 °F (36 6 °C)  HR:  [69-95] 92  Resp:  [17-18] 18  BP: (104-138)/(66-86) 134/86  SpO2:  [91 %-99 %] 91 %  Temp (24hrs), Av 9 °F (36 6 °C), Min:97 5 °F (36 4 °C), Max:98 1 °F (36 7 °C)  Current: Temperature: 97 9 °F (36 6 °C)    Intake/Output Summary (Last 24 hours) at 2022 1623  Last data filed at 2022 0501  Gross per 24 hour   Intake 610 ml   Output 350 ml   Net 260 ml       General Appearance:  40-year-old youthful male, appearing propped fairly comfortably in bed, nontoxic, and in no distress   Head:  Normocephalic, without obvious abnormality, atraumatic   Eyes:  Conjunctiva pink and sclera anicteric, both eyes   Nose: Nares normal, mucosa normal, no drainage   Throat: Oropharynx moist without lesions   Neck: Supple, symmetrical, no adenopathy, no tenderness/mass/nodules   Back:   Symmetric, no curvature, ROM normal, no CVA tenderness   Lungs:   Clear to auscultation bilaterally, respirations unlabored   Chest Wall:  No tenderness or deformity   Heart:  RRR; no murmur, rub or gallop   Abdomen:   Soft, no focal tenderness at present, non-distended, positive bowel sounds    Extremities: No cyanosis, clubbing or edema   Skin: Right thigh macular eruption  IV site not tender  No draining wounds noted     Lymph nodes: Cervical, supraclavicular nodes normal Neurologic: Alert and oriented times 3, extremity strength 5/5 and symmetric       LABS, IMAGING, & OTHER STUDIES:  Lab Results:  I have personally reviewed pertinent labs  Results from last 7 days   Lab Units 02/28/22 0529 02/27/22 0427 02/26/22 0538   WBC Thousand/uL 9 77 8 66 7 02   HEMOGLOBIN g/dL 10 3* 9 3* 9 5*   PLATELETS Thousands/uL 404* 307 278     Results from last 7 days   Lab Units 02/28/22 0529 02/27/22 0427 02/27/22 0427 02/26/22  0538 02/26/22  0538 02/25/22  0536 02/25/22  0536 02/24/22  0546 02/24/22  0546 02/23/22 2019 02/23/22 2019   SODIUM mmol/L 143  --  139  --  137   < > 134*   < > 134*   < > 134*   POTASSIUM mmol/L 3 9   < > 3 7   < > 3 5   < > 3 7   < > 4 6   < > 3 9   CHLORIDE mmol/L 108   < > 105   < > 102   < > 100   < > 104   < > 97*   CO2 mmol/L 28   < > 27   < > 27   < > 29   < > 23   < > 26   BUN mg/dL 6   < > 5   < > 7   < > 9   < > 11   < > 13   CREATININE mg/dL 1 13   < > 1 03   < > 1 01   < > 1 09   < > 1 25   < > 1 34*   EGFR ml/min/1 73sq m 87   < > 98   < > 100   < > 91   < > 77   < > 71   CALCIUM mg/dL 8 0*   < > 7 7*   < > 7 8*   < > 8 1*   < > 7 3*   < > 8 7   AST U/L  --   --   --   --   --   --  19  --  39  --  27   ALT U/L  --   --   --   --   --   --  29   < > 35   < > 44   ALK PHOS U/L  --   --   --   --   --   --  71   < > 76   < > 104    < > = values in this interval not displayed  Results from last 7 days   Lab Units 02/25/22  1130 02/25/22  1119 02/23/22  2259 02/23/22  2250 02/23/22 2205   BLOOD CULTURE  No Growth at 72 hrs  No Growth at 72 hrs   Streptococcus parasanguinis*  Staphylococcus hominis*  Streptococcus mitis oralis group* Streptococcus mitis oralis group*  Staphylococcus hominis*  Streptococcus salivarius*  --    GRAM STAIN RESULT   --   --  Gram positive cocci in chains*  Gram positive cocci in clusters* Gram positive cocci in chains*  Gram positive cocci in clusters*  --    C DIFF TOXIN B BY PCR   --   --   --   --  Negative Results from last 7 days   Lab Units 02/23/22  2157   CRP mg/L 16 0*     Results from last 7 days   Lab Units 02/25/22  0536   FERRITIN ng/mL 257           Imaging Studies:   02/28/2022 obstructive series: Bowel gas pattern consistent with ileus versus enteritis  02/24/2022 CT abdomen pelvis:  Stable mild wall thickening of the distal ileum and pouch compared to CT scan from 02/21/2022 c/w chronic inflammatory enteritis findings without obstruction or perforation seen  Other Studies:   I have personally reviewed pertinent reports

## 2022-02-28 NOTE — PROGRESS NOTES
[address] Internal Medicine Progress Note  Patient: Padilla Fine 27 y o  male   MRN: [id number]  PCP: Abigail Holder DO  Unit/Bed#: 2 [place] [address] Encounter: [id number]  Date Of Visit: 02/28/22    Problem List:    Principal Problem:    Enteritis  Active Problems:    SIRS (systemic inflammatory response syndrome) (HCC)    Ankylosing spondylitis ([address] )    Positive blood cultures    CAR (generalized anxiety disorder)    Anemia      Assessment & Plan:    SIRS (systemic inflammatory response syndrome) ([facility])  Assessment & Plan  As evidenced by fever, tachycardia, leukocytosis  CRP mildly elevated   Likely secondary enteritis and dehydration , possible bacteremia  Remains afebrile, leukocytosis improved  · Follow-up blood culture and sensitivities  · Follow-up Repeat blood cultures-negative so far  · Trend CBC and fever  · Follow-up stool studies -negative so far  · Continue antibiotics as below      * Enteritis  Assessment & Plan  Patient has history of UC post colectomy and ileal pouch formation, follows up at [address]   history of ankylosing spondylosis maintained on [place] Love  Presented with abdominal pain two days ago with nausea and vomiting  CT with findings of enteritis at that time  Symptoms worsened prompting him to return to the ER  He reported 30 episodes of diarrhea which became bloody towards the ends  He reports chills, +fever in ER  CT of the A/P revealed no interval change, stable mild wall thickening of the distal ileum and pouch - may be nonspecific enteritis  Discussed with team at [address] on admission and they recommended no steroids, c  Diff testing and GI consult  Diarrhea frequency overall improved close to baseline  No further bleeding still with intermittent discomfort and nausea  Feels distended  Tolerating diet   Will check obstructive series   Monitor off IV fluids   stool studies -stool C diff negative    Follow-up bacterial PCR-negative   GI following, input appreciated-discussed with GI for need for endoscopy to evaluate pouch   Initially treated with ciprofloxacin and metronidazole-now discontinued   Continue diet as tolerated   Monitor intake output    Monitor abdominal exam   Symptomatic treatment   Patient will require endoscopic evaluation of the pouch at some point    Positive blood cultures  Assessment & Plan  Blood cultures obtained on admission 2/2 noted to be positive for polymicrobial growth revealing Staphylococcus hominis, Streptococcus parasanguinis, Streptococcus mitis oralis, Streptococcus salivarius   Appears to have obtained from the same site and the time,? Contaminant versus intra-abdominal source  · Continue vancomycin for now  · Follow-up culture sensitivity  · Follow up repeat blood cultures-remains negative so far  · Monitor fever and CBC  · ID evaluation      Ankylosing spondylitis ([facility] [address] )  Assessment & Plan  On Xeljanz    Anemia  Assessment & Plan  microcytic  Downtrending during hospitalization likely dilutional with associated with mild GI loss  No evidence of acute overt bleeding at present  · Continue to monitor  · Iron sat 6%, will initiate iron on discharge      CAR (generalized anxiety disorder)  Assessment & Plan  Continue cymbalta           VTE Pharmacologic Prophylaxis:   Moderate Risk (Score 3-4) - Pharmacological DVT Prophylaxis Contraindicated  Sequential Compression Devices Ordered  Patient Centered Rounds: I performed bedside rounds with nursing staff today  Discussions with Specialists or Other Care Team Provider:  Yes , GI and ID    Education and Discussions with Family / Patient: Discussed with patient  Updated family at bedside    Time Spent for Care: 45 minutes  More than 50% of total time spent on counseling and coordination of care as described above      Current Length of Stay: 5 day(s)  Current Patient Status: Inpatient   Certification Statement: The patient will continue to require additional inpatient hospital stay due to Enteritis  Discharge Plan: Anticipate discharge in 24-48 hrs to home  Code Status: Level 1 - Full Code    Subjective:   Still with intermittent abdominal discomfort requiring pain medication  Mild nausea but tolerating diet  Patient feels that he is having difficulty in having bowel movement at times  About 8 loose bowel movements since yesterday but not significantly different than his baseline  Denies any further bleeding    Denies any fever chills    Objective:   Comfortably lying in bed  Vitals:   Temp (24hrs), Av 8 °F (36 6 °C), Min:97 5 °F (36 4 °C), Max:98 1 °F (36 7 °C)    Temp:  [97 5 °F (36 4 °C)-98 1 °F (36 7 °C)] 97 6 °F (36 4 °C)  HR:  [69-95] 69  Resp:  [17-18] 18  BP: (104-138)/(66-93) 104/66  SpO2:  [92 %-95 %] 92 % on room air  Body mass index is 27 23 kg/m²  Input and Output Summary (last 24 hours): Intake/Output Summary (Last 24 hours) at 2022 1054  Last data filed at 2022 0501  Gross per 24 hour   Intake 610 ml   Output 350 ml   Net 260 ml       Physical Exam:   Physical Exam  Constitutional:       General: He is not in acute distress  HENT:      Head: Normocephalic and atraumatic  Cardiovascular:      Rate and Rhythm: Normal rate  Pulmonary:      Effort: Pulmonary effort is normal  No respiratory distress  Breath sounds: No wheezing, rhonchi or rales  Chest:      Chest wall: No tenderness  Abdominal:      General: Bowel sounds are normal  There is no distension  Palpations: Abdomen is soft  Tenderness: There is abdominal tenderness (Mild lower abdominal)  There is no guarding or rebound  Musculoskeletal:      Right lower leg: No edema  Left lower leg: No edema  Skin:     General: Skin is warm and dry  Findings: No rash  Neurological:      General: No focal deficit present  Mental Status: He is alert and oriented to person, place, and time  Mental status is at baseline        Cranial Nerves: No cranial nerve deficit  Additional Data:     Labs:  Results from last 7 days   Lab Units 02/28/22  0529 02/27/22  0427 02/27/22  0427   WBC Thousand/uL 9 77   < > 8 66   HEMOGLOBIN g/dL 10 3*   < > 9 3*   HEMATOCRIT % 36 0*   < > 31 2*   PLATELETS Thousands/uL 404*   < > 307   NEUTROS PCT %  --   --  68   LYMPHS PCT %  --   --  16   MONOS PCT %  --   --  11   EOS PCT %  --   --  3    < > = values in this interval not displayed  Results from last 7 days   Lab Units 02/28/22  0529 02/26/22  0538 02/25/22  0536   SODIUM mmol/L 143   < > 134*   POTASSIUM mmol/L 3 9   < > 3 7   CHLORIDE mmol/L 108   < > 100   CO2 mmol/L 28   < > 29   BUN mg/dL 6   < > 9   CREATININE mg/dL 1 13   < > 1 09   ANION GAP mmol/L 7   < > 5   CALCIUM mg/dL 8 0*   < > 8 1*   ALBUMIN g/dL  --   --  3 0*   TOTAL BILIRUBIN mg/dL  --   --  0 76   ALK PHOS U/L  --   --  71   ALT U/L  --   --  29   AST U/L  --   --  19   GLUCOSE RANDOM mg/dL 84   < > 83    < > = values in this interval not displayed  Results from last 7 days   Lab Units 02/24/22  0734   POC GLUCOSE mg/dl 89         Results from last 7 days   Lab Units 02/23/22  2259   LACTIC ACID mmol/L 1 7       Lines/Drains:  Invasive Devices  Report    Peripheral Intravenous Line            Peripheral IV 02/28/22 Left;Ventral (anterior) Forearm <1 day                      Imaging: Reviewed radiology reports from this admission including: abdominal/pelvic CT    Recent Cultures (last 7 days):   Results from last 7 days   Lab Units 02/25/22  1130 02/25/22  1119 02/23/22  2259 02/23/22  2250 02/23/22  2205   BLOOD CULTURE  No Growth at 48 hrs  No Growth at 48 hrs   Streptococcus parasanguinis*  Staphylococcus hominis*  Streptococcus mitis oralis group* Streptococcus mitis oralis group*  Staphylococcus hominis*  Streptococcus salivarius*  --    GRAM STAIN RESULT   --   --  Gram positive cocci in chains*  Gram positive cocci in clusters* Gram positive cocci in chains*  Gram positive cocci in clusters*  --    C DIFF TOXIN B BY PCR   --   --   --   --  Negative       Last 24 Hours Medication List:   Current Facility-Administered Medications   Medication Dose Route Frequency Provider Last Rate    acetaminophen  650 mg Rectal Q4H PRN KY Matthews      chlorproMAZINE  25 mg Oral Q6H PRN Juliocesar Tao PA-C      dicyclomine  10 mg Oral Q6H PRN Bassam Orona MD      DULoxetine  30 mg Oral Daily KY Matthews      HYDROmorphone  1 mg Intravenous Q3H PRN Bassam Orona MD      promethazine  25 mg Intravenous Q6H PRN KY Matthews      vancomycin  2,000 mg Intravenous Q12H KY Matthews 2,000 mg (02/28/22 0319)        Today, Patient Was Seen By: Bassam Orona MD    ** Please Note: "This note has been constructed using a voice recognition system  Therefore there may be syntax, spelling, and/or grammatical errors   Please call if you have any questions  "**

## 2022-02-28 NOTE — PLAN OF CARE
Problem: PAIN - ADULT  Goal: Verbalizes/displays adequate comfort level or baseline comfort level  Description: Interventions:  - Encourage patient to monitor pain and request assistance  - Assess pain using appropriate pain scale  - Administer analgesics based on type and severity of pain and evaluate response  - Implement non-pharmacological measures as appropriate and evaluate response  - Consider cultural and social influences on pain and pain management  - Notify physician/advanced practitioner if interventions unsuccessful or patient reports new pain  Outcome: Progressing     Problem: GASTROINTESTINAL - ADULT  Goal: Minimal or absence of nausea and/or vomiting  Description: INTERVENTIONS:  - Administer IV fluids if ordered to ensure adequate hydration  - Maintain NPO status until nausea and vomiting are resolved  - Administer ordered antiemetic medications as needed  - Provide nonpharmacologic comfort measures as appropriate  - Advance diet as tolerated, if ordered  - Consider nutrition services referral to assist patient with adequate nutrition and appropriate food choices  Outcome: Progressing  Goal: Maintains or returns to baseline bowel function  Description: INTERVENTIONS:  - Assess bowel function  - Encourage oral fluids to ensure adequate hydration  - Administer IV fluids if ordered to ensure adequate hydration  - Administer ordered medications as needed  - Encourage mobilization and activity  - Consider nutritional services referral to assist patient with adequate nutrition and appropriate food choices  Outcome: Progressing  Goal: Maintains adequate nutritional intake  Description: INTERVENTIONS:  - Monitor percentage of each meal consumed  - Identify factors contributing to decreased intake, treat as appropriate  - Assist with meals as needed  - Monitor I&O, weight, and lab values if indicated  - Obtain nutrition services referral as needed  Outcome: Progressing     Problem: Nutrition/Hydration-ADULT  Goal: Nutrient/Hydration intake appropriate for improving, restoring or maintaining nutritional needs  Description: Monitor and assess patient's nutrition/hydration status for malnutrition  Collaborate with interdisciplinary team and initiate plan and interventions as ordered  Monitor patient's weight and dietary intake as ordered or per policy  Utilize nutrition screening tool and intervene as necessary  Determine patient's food preferences and provide high-protein, high-caloric foods as appropriate       INTERVENTIONS:  - Monitor oral intake, urinary output, labs, and treatment plans  - Assess nutrition and hydration status and recommend course of action  - Evaluate amount of meals eaten  - Assist patient with eating if necessary   - Allow adequate time for meals  - Recommend/ encourage appropriate diets, oral nutritional supplements, and vitamin/mineral supplements  - Order, calculate, and assess calorie counts as needed  - Assess need for intravenous fluids  - Provide specific nutrition/hydration education as appropriate  - Include patient/family/caregiver in decisions related to nutrition  Outcome: Progressing     Problem: Potential for Falls  Goal: Patient will remain free of falls  Description: INTERVENTIONS:  - Educate patient/family on patient safety including physical limitations  - Instruct patient to call for assistance with activity   - Consult OT/PT to assist with strengthening/mobility   - Keep Call bell within reach  - Keep bed low and locked with side rails adjusted as appropriate  - Keep care items and personal belongings within reach  - Initiate and maintain comfort rounds  - Make Fall Risk Sign visible to staff  - Offer Toileting every 2 Hours, in advance of need  - Initiate/Maintain bed alarm  - Obtain necessary fall risk management equipment: yes  - Apply yellow socks and bracelet for high fall risk patients  - Consider moving patient to room near nurses station  Outcome: Progressing

## 2022-02-28 NOTE — PROGRESS NOTES
Progress Note - [facility]LP GI  [place]  27 y o  male MRN: [id number]    Unit/Bed#: [address]  Encounter: [id number]      Assessment/Plan:  1  Abdominal pain associated with nausea and vomiting  18-year-old male with history of ankylosing spondylitis on xeljanz and ulcerative colitis status post proctocolectomy and J-pouch formation in 2015 who presented with acute nausea, vomiting, and abdominal pain  CT scan showed no significant interval change  Stable mild wall thickening of the distal ileum and pouch which may be due to nonspecific enteritis  No bowel obstruction  Stable distension of the rectal pouch  Patient noted to have leukocytosis on admission as well as elevated temperature  Leukocytosis has resolved, white blood count 9 77  Remains afebrile  Hemoglobin 10 3 and stable  Preliminary blood cultures grew Gram-positive cocci and chains and clusters  Repeat blood cultures drawn  C diff negative  Stool for bacterial panel negative  Reached out to Ans[place], [employer] inflammatory bowel [address] concerning GI treatment plan to proceed with lower endoscopy tomorrow for evaluation of J pouch and possible dilation if stricture present, awaiting return call     -Continue antibiotics per attending  -Pain management per attending   -Clear liquid diet  -Schedule for lower endoscopy procedures for evaluation J-pouch and possible dilation if stricture present  Prep and procedure explained to patient in detail  Further recommendations pending results of endoscopy  Subjective:   Lying in bed in no acute distress  Tolerating diet  Appetite decreased  Reports nausea but no vomiting  Denies acid reflux or heartburn  Denies blood in stool, blood from rectal area, or black tarry stool  Patient reports moving bowels the only having small bowel movements  Objective:     Vitals: Blood pressure 104/66, pulse 69, temperature 97 6 °F (36 4 °C), temperature source Oral, resp   rate 18, height 5' 9" (1 753 m), weight 83 6 kg (184 lb 6 4 oz), SpO2 92 %  ,Body mass index is 27 23 kg/m²  Intake/Output Summary (Last 24 hours) at 2/28/2022 0904  Last data filed at 2/28/2022 0501  Gross per 24 hour   Intake 850 ml   Output 850 ml   Net 0 ml       Physical Exam: Physical Exam  Vitals and nursing note reviewed  Cardiovascular:      Rate and Rhythm: Normal rate and regular rhythm  Pulses: Normal pulses  Heart sounds: Normal heart sounds  Pulmonary:      Effort: Pulmonary effort is normal  No respiratory distress  Breath sounds: Normal breath sounds  No stridor  No wheezing, rhonchi or rales  Abdominal:      General: Bowel sounds are normal  There is no distension  Palpations: Abdomen is soft  There is no mass  Tenderness: There is abdominal tenderness  There is no guarding or rebound  Hernia: No hernia is present  Comments: Previous surgical scars noted on abdomen  Abdomen tender to palpation in lower abdominal area  Musculoskeletal:      Right lower leg: No edema  Left lower leg: No edema  Skin:     General: Skin is warm and dry  Capillary Refill: Capillary refill takes less than 2 seconds  Coloration: Skin is not jaundiced or pale  Neurological:      Mental Status: He is alert and oriented to person, place, and time  Psychiatric:         Mood and Affect: Mood normal          Invasive Devices  Report    Peripheral Intravenous Line            Peripheral IV 02/25/22 Distal;Dorsal (posterior); Right Forearm 2 days    Peripheral IV 02/28/22 Left;Ventral (anterior) Forearm <1 day                Current Facility-Administered Medications:     acetaminophen (TYLENOL) rectal suppository 650 mg, 650 mg, Rectal, Q4H PRN, [place] Penny, 650 mg at 02/24/22 0004    chlorproMAZINE (THORAZINE) tablet 25 mg, 25 mg, Oral, Q6H PRN, Marty Andrade PA-C, 25 mg at 02/27/22 1520    dicyclomine (BENTYL) capsule 10 mg, 10 mg, Oral, Q6H PRN, Suresh Monroy MD    DULoxetine (CYMBALTA) delayed release capsule 30 mg, 30 mg, Oral, Daily, Thomas Cap, CRNP, 30 mg at 02/28/22 0816    HYDROmorphone (DILAUDID) injection 1 mg, 1 mg, Intravenous, Q3H PRN, Clive Cid MD, 1 mg at 02/28/22 0816    promethazine (PHENERGAN) injection 25 mg, 25 mg, Intravenous, Q6H PRN, Thomas Cap, CRNP, 25 mg at 02/27/22 1947    vancomycin (VANCOCIN) 2,000 mg in sodium chloride 0 9 % 500 mL IVPB, 2,000 mg, Intravenous, Q12H, Thomas Cap, CRNP, Last Rate: 250 mL/hr at 02/28/22 0319, 2,000 mg at 02/28/22 0319    Lab Results:   Recent Results (from the past 24 hour(s))   CBC    Collection Time: 02/28/22  5:29 AM   Result Value Ref Range    WBC 9 77 4 31 - 10 16 Thousand/uL    RBC 5 51 3 88 - 5 62 Million/uL    Hemoglobin 10 3 (L) 12 0 - 17 0 g/dL    Hematocrit 36 0 (L) 36 5 - 49 3 %    MCV 65 (L) 82 - 98 fL    MCH 18 7 (L) 26 8 - 34 3 pg    MCHC 28 6 (L) 31 4 - 37 4 g/dL    RDW 18 0 (H) 11 6 - 15 1 %    Platelets 544 (H) 251 - 390 Thousands/uL    MPV 8 7 (L) 8 9 - 12 7 fL   Basic metabolic panel    Collection Time: 02/28/22  5:29 AM   Result Value Ref Range    Sodium 143 136 - 145 mmol/L    Potassium 3 9 3 5 - 5 3 mmol/L    Chloride 108 100 - 108 mmol/L    CO2 28 21 - 32 mmol/L    ANION GAP 7 4 - 13 mmol/L    BUN 6 5 - 25 mg/dL    Creatinine 1 13 0 60 - 1 30 mg/dL    Glucose 84 65 - 140 mg/dL    Calcium 8 0 (L) 8 3 - 10 1 mg/dL    eGFR 87 ml/min/1 73sq m             Imaging Studies: CT abdomen pelvis with contrast    Result Date: 2/24/2022  Narrative: CT ABDOMEN AND PELVIS WITH IV CONTRAST INDICATION:   Sepsis  COMPARISON:  CT of abdomen pelvis on February 21, 2022  TECHNIQUE:  CT examination of the abdomen and pelvis was performed  Axial, sagittal, and coronal 2D reformatted images were created from the source data and submitted for interpretation  Radiation dose length product (DLP) for this visit:  756 79 mGy-cm     This examination, like all CT scans performed in the [facility]  [facility], was performed utilizing techniques to minimize radiation dose exposure, including the use of iterative  reconstruction and automated exposure control  IV Contrast:  100 mL of iohexol (OMNIPAQUE) Enteric Contrast:  Enteric contrast was not administered  FINDINGS: ABDOMEN LOWER CHEST:  No clinically significant abnormality identified in the visualized lower chest  LIVER/BILIARY TREE:  Unremarkable  GALLBLADDER:  No calcified gallstones  No pericholecystic inflammatory change  SPLEEN:  Unremarkable  PANCREAS:  Unremarkable  ADRENAL GLANDS:  Unremarkable  KIDNEYS/URETERS:  Unremarkable  No hydronephrosis  STOMACH AND BOWEL:  Status post colectomy and ileal pouch anal anastomosis  Stable mild wall thickening of the distal ileum and pouch  No bowel obstruction  Stable distention of the rectal pouch  Stable gaseous distention of a loop of bowel in the upper abdomen  APPENDIX:  Absent ABDOMINOPELVIC CAVITY:  No ascites  No pneumoperitoneum  Stable enlarged right external iliac lymph node measuring 1 5 cm in short axis (series 2, image 69)  Stable right para-aortic lymph noted measuring 1 2 cm in short axis (series 2, and 49) VESSELS:  Unremarkable for patient's age  PELVIS REPRODUCTIVE ORGANS:  Unremarkable for patient's age  URINARY BLADDER:  Unremarkable  ABDOMINAL WALL/INGUINAL REGIONS:  Unremarkable  OSSEOUS STRUCTURES:  No acute fracture or destructive osseous lesion  Impression: No significant interval change  Stable mild wall thickening of the distal ileum and pouch which may be due to nonspecific enteritis  Workstation performed: [id number]     CT abdomen pelvis w contrast    Result Date: 2/21/2022  Narrative: CT ABDOMEN AND PELVIS WITH IV CONTRAST INDICATION:   llq pain history of crohn's/pancolitis  COMPARISON:  January 2, 2022 TECHNIQUE:  CT examination of the abdomen and pelvis was performed   Axial, sagittal, and coronal 2D reformatted images were created from the source data and submitted for interpretation  Radiation dose length product (DLP) for this visit:  170 99 mGy-cm   This examination, like all CT scans performed in the [facility], was performed utilizing techniques to minimize radiation dose exposure, including the use of iterative  reconstruction and automated exposure control  IV Contrast:  100 mL of iohexol (OMNIPAQUE) Enteric Contrast:  Enteric contrast was not administered  FINDINGS: ABDOMEN LOWER CHEST:  No clinically significant abnormality identified in the visualized lower chest  LIVER/BILIARY TREE:  Unremarkable  GALLBLADDER:  Contracted  No calcified stones  SPLEEN:  Unremarkable  PANCREAS:  Unremarkable  ADRENAL GLANDS:  Unremarkable  KIDNEYS/URETERS:  Unremarkable  No hydronephrosis  STOMACH AND BOWEL: As stated previously: Patient is status post colectomy and ileal pouch-anal anastomosis  There is no evidence of obstruction  The previous mild bowel wall thickening and mucosal hyperemia in right lower quadrant small bowel creating the ileal pouch persists, (currently best seen on image 2/61-2/79)  Most likely chronic inflammatory enteritis  APPENDIX:  Removed ABDOMINOPELVIC CAVITY:  Stable 1 2 cm aortocaval node (image 2/50) and a stable 1 5 cm right external iliac node  (Image 2/68)  Both have demonstrated long-term stability  Trace fluid in the right pelvis in the obturator region  No free air  VESSELS:  Unremarkable for patient's age  PELVIS REPRODUCTIVE ORGANS:  Unremarkable for patient's age  URINARY BLADDER:  Unremarkable  ABDOMINAL WALL/INGUINAL REGIONS:  Unremarkable  OSSEOUS STRUCTURES:  No acute fracture or destructive osseous lesion  Impression: Patient is status post colectomy and ileal pouch-anal anastomosis  Similar appearance compared from prior exam with persistent mild small bowel wall thickening involving the distal small bowel creating the pouch, but with less pronounced mucosal enhancement    This likely represents chronic inflammatory enteritis  No obstruction or perforation  Trace amounts of pelvic fluid is seen in the right obturator location  Persistent aortocaval and right external iliac adenopathy, which have demonstrated long-term stability  Workstation performed: [id number]           [place]      Please Note: "This note has been constructed using a voice recognition system  Therefore there may be syntax, spelling, and/or grammatical errors   Please call if you have any questions  "**

## 2022-02-28 NOTE — APP STUDENT NOTE
[facility] STUDENT  Inpatient Progress Note for TRAINING ONLY  Not Part of Legal Medical Record       Progress Note - Luly Lean 27 y o  male MRN: [id number]    Unit/Bed#: [address] [place] [address] Encounter: [id number]      Assessment:  1  SIRS  2  Enteritis  3  Positive blood cultures  4  Abdominal Pain   5  Ankylosing spondylitis   6  Generalized anxiety disorder    Plan:  1  Patient had fever, tachycardia, and leukocytosis in the ED after having many episodes of diarrhea              -Lactic acid was normal               -CRP elevated at 16 0 on admission              -Possibly secondary to enteritis                -Fever, leukocytosis, and tachycardia have resolved               -Blood cultures 2 out of 2 positive for gram positive cocci in chains and cluster               -Both blood cultures were drawn from the left arm              -Placed on IV Vancomycin and consulted pharmacy                -Most likely secondary to contamination               -Repeat blood cultures show no growth at 48 hours    -Consulted ID              -Continue IV Vancomycin   2  Positive blood cultures    -First set of blood cultures grew strep mitis oralis, staph hominis, strep salivarus, and strep  parasanguinis   -Repeat blood cultures show no growth at 48 hours    -Continue IV Vancomycin    -Consulted ID  3  Patient presented to the ED 2 days prior to admission with chief complaint of abdominal pain, nausea, and vomiting  Patient with history of UC and is s/p ileal pouch formation  Returned to ED on 2/23 after having 30 episodes of diarrhea which became bloody just prior to presentation                 -CT on 2/21 revealed evidence of enteritis              -Repeat CT on 2/23 revealed stable mild wall thickening of distal ileum and pouch with no significant  change from previous CT              -His treatment was discussed with his team at [facility], who recommended C diff testing, GI consult, and  no steroids               -Infectious vs inflammation -C diff was negative               -Stool enteric panel and fecal leukocytes negative               -CBC has trended down to 9 77 from 15 95 on admission              -Cipro and Flagyl were discontinued 2/26 due to negative enteric panel               -Supportive care with Bentyl, Zofran, Thorazine, and Phenergan   -Obtain Obstructive series for nausea, increased abdominal pain, and difficulty defecating   -Clear liquid diet   -Pouchoscopy to be done 2/28   4  Patient notes increasing and persistent cramping abdominal pain, nausea, and new difficulty having bowel movements     -Possible etiologies include stricture, SBO, or secondary to analgesic medications   -Obstruction series ordered   -Pouchoscopy to be done 2/28  5  On [place]              -Last dose 2/23              -Continue to hold   6  Continue Cymbalta     Subjective:   No overnight events  Patient reports persistent "crampy" abdominal pain  He reports having 8-10 loose bowel movements in the past 24 hours but it has been getting more painful and difficult to have a bowel movement  Reports greatly worsening abdominal pain when he feels like he has to have a BM  Pain is relieved with BM but episodes where he cant defecate are becoming more frequent  Also states that he feels more distended than normal  States it feels similar to prior strictures and is requesting fiber, which he usually takes at home  Currently patient is tolerating oral diet without vomiting but does report having intermittent nausea  Urinating without difficulty  Ambulating and sleeping without difficulty  Denies fevers, chills, chest pain, SOB, melena, blood or mucus in stool, swelling in legs, or pain in legs  Objective:     Vitals: Blood pressure 104/66, pulse 69, temperature 97 6 °F (36 4 °C), temperature source Oral, resp  rate 18, height 5' 9" (1 753 m), weight 83 6 kg (184 lb 6 4 oz), SpO2 92 %  ,Body mass index is 27 23 kg/m²        Intake/Output Summary (Last 24 hours) at 2/28/2022 0950  Last data filed at 2/28/2022 0501  Gross per 24 hour   Intake 850 ml   Output 850 ml   Net 0 ml       Physical Exam: General appearance: alert and oriented, in no acute distress  Head: Normocephalic, without obvious abnormality, atraumatic  Eyes: No conjunctival pallor or scleral icterus   Ears: External ears without abnormalities   Nose: no discharge  Throat: MMM  Lungs: clear to auscultation bilaterally  Heart: regular rate and rhythm  Abdomen: Bowel sounds in all four quadrants  Mildly tender to palpation in lower abdomen  Soft, does not appear to be distended  No guarding, rigidity, or rebound  Extremities: extremities normal, warm and well-perfused; no cyanosis, clubbing, or edema     Invasive Devices  Report    Peripheral Intravenous Line            Peripheral IV 02/28/22 Left;Ventral (anterior) Forearm <1 day                Lab, Imaging and other studies: I have personally reviewed pertinent reports      VTE Pharmacologic Prophylaxis: Reason for no pharmacologic prophylaxis Rectal bleeding   VTE Mechanical Prophylaxis: sequential compression device

## 2022-03-01 ENCOUNTER — ANESTHESIA EVENT (INPATIENT)
Dept: PERIOP | Facility: HOSPITAL | Age: 27
DRG: 394 | End: 2022-03-01
Payer: COMMERCIAL

## 2022-03-01 ENCOUNTER — APPOINTMENT (INPATIENT)
Dept: PERIOP | Facility: HOSPITAL | Age: 27
DRG: 394 | End: 2022-03-01
Payer: COMMERCIAL

## 2022-03-01 ENCOUNTER — APPOINTMENT (INPATIENT)
Dept: NON INVASIVE DIAGNOSTICS | Facility: HOSPITAL | Age: 27
DRG: 394 | End: 2022-03-01
Payer: COMMERCIAL

## 2022-03-01 ENCOUNTER — ANESTHESIA (INPATIENT)
Dept: PERIOP | Facility: HOSPITAL | Age: 27
DRG: 394 | End: 2022-03-01
Payer: COMMERCIAL

## 2022-03-01 LAB
ANION GAP SERPL CALCULATED.3IONS-SCNC: 6 MMOL/L (ref 4–13)
BASOPHILS # BLD AUTO: 0.07 THOUSANDS/ΜL (ref 0–0.1)
BASOPHILS NFR BLD AUTO: 1 % (ref 0–1)
BUN SERPL-MCNC: 4 MG/DL (ref 5–25)
CALCIUM SERPL-MCNC: 8.1 MG/DL (ref 8.3–10.1)
CHLORIDE SERPL-SCNC: 104 MMOL/L (ref 100–108)
CO2 SERPL-SCNC: 29 MMOL/L (ref 21–32)
CREAT SERPL-MCNC: 0.98 MG/DL (ref 0.6–1.3)
EOSINOPHIL # BLD AUTO: 0.31 THOUSAND/ΜL (ref 0–0.61)
EOSINOPHIL NFR BLD AUTO: 4 % (ref 0–6)
ERYTHROCYTE [DISTWIDTH] IN BLOOD BY AUTOMATED COUNT: 17.6 % (ref 11.6–15.1)
FLUAV RNA RESP QL NAA+PROBE: NEGATIVE
FLUBV RNA RESP QL NAA+PROBE: NEGATIVE
GFR SERPL CREATININE-BSD FRML MDRD: 104 ML/MIN/1.73SQ M
GLUCOSE SERPL-MCNC: 88 MG/DL (ref 65–140)
HCT VFR BLD AUTO: 34.9 % (ref 36.5–49.3)
HGB BLD-MCNC: 10.4 G/DL (ref 12–17)
IMM GRANULOCYTES # BLD AUTO: 0.06 THOUSAND/UL (ref 0–0.2)
IMM GRANULOCYTES NFR BLD AUTO: 1 % (ref 0–2)
LYMPHOCYTES # BLD AUTO: 1.68 THOUSANDS/ΜL (ref 0.6–4.47)
LYMPHOCYTES NFR BLD AUTO: 22 % (ref 14–44)
MCH RBC QN AUTO: 19.1 PG (ref 26.8–34.3)
MCHC RBC AUTO-ENTMCNC: 29.8 G/DL (ref 31.4–37.4)
MCV RBC AUTO: 64 FL (ref 82–98)
MONOCYTES # BLD AUTO: 1.14 THOUSAND/ΜL (ref 0.17–1.22)
MONOCYTES NFR BLD AUTO: 15 % (ref 4–12)
NEUTROPHILS # BLD AUTO: 4.55 THOUSANDS/ΜL (ref 1.85–7.62)
NEUTS SEG NFR BLD AUTO: 57 % (ref 43–75)
NRBC BLD AUTO-RTO: 0 /100 WBCS
PLATELET # BLD AUTO: 393 THOUSANDS/UL (ref 149–390)
PMV BLD AUTO: 8.4 FL (ref 8.9–12.7)
POTASSIUM SERPL-SCNC: 3.7 MMOL/L (ref 3.5–5.3)
RBC # BLD AUTO: 5.44 MILLION/UL (ref 3.88–5.62)
RSV RNA RESP QL NAA+PROBE: NEGATIVE
SARS-COV-2 RNA RESP QL NAA+PROBE: NEGATIVE
SODIUM SERPL-SCNC: 139 MMOL/L (ref 136–145)
WBC # BLD AUTO: 7.81 THOUSAND/UL (ref 4.31–10.16)

## 2022-03-01 PROCEDURE — 80048 BASIC METABOLIC PNL TOTAL CA: CPT | Performed by: INTERNAL MEDICINE

## 2022-03-01 PROCEDURE — 0241U HB NFCT DS VIR RESP RNA 4 TRGT: CPT | Performed by: STUDENT IN AN ORGANIZED HEALTH CARE EDUCATION/TRAINING PROGRAM

## 2022-03-01 PROCEDURE — C1726 CATH, BAL DIL, NON-VASCULAR: HCPCS

## 2022-03-01 PROCEDURE — 36573 INSJ PICC RS&I 5 YR+: CPT | Performed by: RADIOLOGY

## 2022-03-01 PROCEDURE — 88305 TISSUE EXAM BY PATHOLOGIST: CPT | Performed by: PATHOLOGY

## 2022-03-01 PROCEDURE — 44799 UNLISTED PX SMALL INTESTINE: CPT | Performed by: INTERNAL MEDICINE

## 2022-03-01 PROCEDURE — 44386 ENDOSCOPY BOWEL POUCH/BIOP: CPT | Performed by: INTERNAL MEDICINE

## 2022-03-01 PROCEDURE — 02HV33Z INSERTION OF INFUSION DEVICE INTO SUPERIOR VENA CAVA, PERCUTANEOUS APPROACH: ICD-10-PCS | Performed by: RADIOLOGY

## 2022-03-01 PROCEDURE — C1751 CATH, INF, PER/CENT/MIDLINE: HCPCS

## 2022-03-01 PROCEDURE — 99232 SBSQ HOSP IP/OBS MODERATE 35: CPT | Performed by: STUDENT IN AN ORGANIZED HEALTH CARE EDUCATION/TRAINING PROGRAM

## 2022-03-01 PROCEDURE — 93306 TTE W/DOPPLER COMPLETE: CPT

## 2022-03-01 PROCEDURE — 85025 COMPLETE CBC W/AUTO DIFF WBC: CPT | Performed by: INTERNAL MEDICINE

## 2022-03-01 PROCEDURE — 0D7B8ZZ DILATION OF ILEUM, VIA NATURAL OR ARTIFICIAL OPENING ENDOSCOPIC: ICD-10-PCS | Performed by: INTERNAL MEDICINE

## 2022-03-01 PROCEDURE — 99233 SBSQ HOSP IP/OBS HIGH 50: CPT | Performed by: INTERNAL MEDICINE

## 2022-03-01 PROCEDURE — 0DBB8ZX EXCISION OF ILEUM, VIA NATURAL OR ARTIFICIAL OPENING ENDOSCOPIC, DIAGNOSTIC: ICD-10-PCS | Performed by: INTERNAL MEDICINE

## 2022-03-01 PROCEDURE — 36573 INSJ PICC RS&I 5 YR+: CPT

## 2022-03-01 PROCEDURE — 0D7P8ZZ DILATION OF RECTUM, VIA NATURAL OR ARTIFICIAL OPENING ENDOSCOPIC: ICD-10-PCS | Performed by: INTERNAL MEDICINE

## 2022-03-01 RX ORDER — SODIUM CHLORIDE, SODIUM LACTATE, POTASSIUM CHLORIDE, CALCIUM CHLORIDE 600; 310; 30; 20 MG/100ML; MG/100ML; MG/100ML; MG/100ML
INJECTION, SOLUTION INTRAVENOUS CONTINUOUS PRN
Status: DISCONTINUED | OUTPATIENT
Start: 2022-03-01 | End: 2022-03-01

## 2022-03-01 RX ORDER — ALPRAZOLAM 0.25 MG/1
0.25 TABLET ORAL DAILY PRN
Status: DISCONTINUED | OUTPATIENT
Start: 2022-03-01 | End: 2022-03-03 | Stop reason: HOSPADM

## 2022-03-01 RX ORDER — HYDROCORTISONE 100 MG/60ML
100 SUSPENSION RECTAL
Status: DISCONTINUED | OUTPATIENT
Start: 2022-03-01 | End: 2022-03-03 | Stop reason: HOSPADM

## 2022-03-01 RX ORDER — ONDANSETRON 2 MG/ML
4 INJECTION INTRAMUSCULAR; INTRAVENOUS ONCE AS NEEDED
Status: CANCELLED | OUTPATIENT
Start: 2022-03-01

## 2022-03-01 RX ORDER — PROPOFOL 10 MG/ML
INJECTION, EMULSION INTRAVENOUS AS NEEDED
Status: DISCONTINUED | OUTPATIENT
Start: 2022-03-01 | End: 2022-03-01

## 2022-03-01 RX ORDER — LIDOCAINE WITH 8.4% SOD BICARB 0.9%(10ML)
SYRINGE (ML) INJECTION CODE/TRAUMA/SEDATION MEDICATION
Status: COMPLETED | OUTPATIENT
Start: 2022-03-01 | End: 2022-03-01

## 2022-03-01 RX ADMIN — HYDROMORPHONE HYDROCHLORIDE 1 MG: 1 INJECTION, SOLUTION INTRAMUSCULAR; INTRAVENOUS; SUBCUTANEOUS at 08:54

## 2022-03-01 RX ADMIN — HYDROMORPHONE HYDROCHLORIDE 1 MG: 1 INJECTION, SOLUTION INTRAMUSCULAR; INTRAVENOUS; SUBCUTANEOUS at 19:28

## 2022-03-01 RX ADMIN — CEFAZOLIN SODIUM 2000 MG: 2 SOLUTION INTRAVENOUS at 21:30

## 2022-03-01 RX ADMIN — CEFAZOLIN SODIUM 2000 MG: 2 SOLUTION INTRAVENOUS at 12:55

## 2022-03-01 RX ADMIN — SODIUM CHLORIDE, SODIUM LACTATE, POTASSIUM CHLORIDE, AND CALCIUM CHLORIDE: .6; .31; .03; .02 INJECTION, SOLUTION INTRAVENOUS at 13:57

## 2022-03-01 RX ADMIN — HYDROMORPHONE HYDROCHLORIDE 1 MG: 1 INJECTION, SOLUTION INTRAMUSCULAR; INTRAVENOUS; SUBCUTANEOUS at 22:37

## 2022-03-01 RX ADMIN — HYDROMORPHONE HYDROCHLORIDE 1 MG: 1 INJECTION, SOLUTION INTRAMUSCULAR; INTRAVENOUS; SUBCUTANEOUS at 05:49

## 2022-03-01 RX ADMIN — LIDOCAINE HYDROCHLORIDE 2 ML: 10 INJECTION, SOLUTION INFILTRATION; PERINEURAL at 11:49

## 2022-03-01 RX ADMIN — HYDROMORPHONE HYDROCHLORIDE 1 MG: 1 INJECTION, SOLUTION INTRAMUSCULAR; INTRAVENOUS; SUBCUTANEOUS at 15:19

## 2022-03-01 RX ADMIN — HYDROMORPHONE HYDROCHLORIDE 1 MG: 1 INJECTION, SOLUTION INTRAMUSCULAR; INTRAVENOUS; SUBCUTANEOUS at 12:05

## 2022-03-01 RX ADMIN — PROPOFOL 50 MG: 10 INJECTION, EMULSION INTRAVENOUS at 14:22

## 2022-03-01 RX ADMIN — ALPRAZOLAM 0.25 MG: 0.25 TABLET ORAL at 16:48

## 2022-03-01 RX ADMIN — DULOXETINE HYDROCHLORIDE 30 MG: 30 CAPSULE, DELAYED RELEASE ORAL at 08:53

## 2022-03-01 RX ADMIN — PROPOFOL 150 MG: 10 INJECTION, EMULSION INTRAVENOUS at 14:21

## 2022-03-01 RX ADMIN — HYDROCORTISONE 100 MG: 100 ENEMA RECTAL at 21:30

## 2022-03-01 RX ADMIN — CEFAZOLIN SODIUM 2000 MG: 2 SOLUTION INTRAVENOUS at 05:40

## 2022-03-01 RX ADMIN — PROPOFOL 50 MG: 10 INJECTION, EMULSION INTRAVENOUS at 14:26

## 2022-03-01 RX ADMIN — PROMETHAZINE HYDROCHLORIDE 25 MG: 25 INJECTION INTRAMUSCULAR; INTRAVENOUS at 12:05

## 2022-03-01 RX ADMIN — PROPOFOL 50 MG: 10 INJECTION, EMULSION INTRAVENOUS at 14:30

## 2022-03-01 RX ADMIN — PROPOFOL 50 MG: 10 INJECTION, EMULSION INTRAVENOUS at 14:34

## 2022-03-01 NOTE — ANESTHESIA POSTPROCEDURE EVALUATION
Post-Op Assessment Note    CV Status:  Stable  Pain Score: 0    Pain management: adequate     Mental Status:  Alert and awake   Hydration Status:  Euvolemic   PONV Controlled:  Controlled   Airway Patency:  Patent      Post Op Vitals Reviewed: Yes      Staff: Anesthesiologist, CRNA   Comments: vss        No complications documented      BP   121/66   Temp   97   Pulse  69   Resp   18   SpO2   100

## 2022-03-01 NOTE — ANESTHESIA POSTPROCEDURE EVALUATION
Post-Op Assessment Note    CV Status:  Stable  Pain Score: 0    Pain management: adequate     Mental Status:  Alert and awake   Hydration Status:  Euvolemic   PONV Controlled:  Controlled   Airway Patency:  Patent      Post Op Vitals Reviewed: Yes      Staff: CRNA, Anesthesiologist   Comments: vss        No complications documented      /53 (03/01/22 1442)    Temp      Pulse 63 (03/01/22 1442)   Resp 16 (03/01/22 1442)    SpO2 96 % (03/01/22 1442)

## 2022-03-01 NOTE — PROGRESS NOTES
Progress Note - Infectious Disease   Van Bodily 27 y o  male MRN: [id number]  Unit/Bed#: [address] [address] Encounter: [id number]      Impression/Plan:  1  Sepsis  POA  With fever, tachycardia and leukocytosis  Admission blood cultures are now growing polymicrobic Streptococcus parasanguinous, strep mitis oralis, Streptococcus salivarius and Staph hominis  Acute enteritis possible source of #1/2  -Continues Cefazolin 2 g IV q 8 hours    -follow-up repeat cultures   -monitor temperature and hemodynamics  -serial exam  -recheck CBC and BMP in a m      2  Polymicrobic bacteremia  While staph hominis could represent skin contaminant, Streptococcus parasanguinous, strep mitis oralis, Streptococcus salivarius in blood cultures in setting of acute enteritis suggests translocation of bowel source of #1/2   02/25/2022 repeat blood cultures negative at 4 days  -antibiotics as above  -PICC line placed   -follow up TTE results  -If TTE negative for vegetation, plan to complete 2 week IV antibiotic course through 3/9/22  -monitor temperature and hemodynamics  -serial exam  -recheck CBC and BMP   -script written for home infusion cefazolin, CBC with diff and creatinine next week and discontinue PICC line on 03/10/2022      3  Acute enteritis  In setting abdominal pain and patient presenting with alternating with constipation and bloody bowel  Patient with ulcerative colitis post colectomy with ileal pouch formation  Prior history of pouchitis in 2018     -monitor stool output  -antibiotics as above  -ongoing GI follow-up  -for lower endoscopy to evaluate J pouch today     4   Ankylosing spondylitis on Stefanie Spencer outpatient biologic immunosuppression  -Jonnieine Tj on hold in patient at present  -symptomatic management per primary care team     Antibiotics:  Cefazolin - abx D6     Above impression and plan discussed in detail with patient, RN, , and primary care team   I spent 35 minutes on the unit floor of which 20 minutes was in counseling/coordination of care as outlined in my note including coordination of outpatient intravenous antibiotics, laboratory follow-up, and working while on outpatient IV antibiotic  Subjective:  Patient has no fever, chills, sweats overnight; no nausea, vomiting; no cough, shortness of breath; no pain  No new symptoms  S/p colonoscopy prep  Patient notes difficulty emptying his pouch  He appears to be tolerating IV antibiotic  No further rash eruption    Objective:  Vitals:  Temp:  [97 6 °F (36 4 °C)-98 2 °F (36 8 °C)] 97 6 °F (36 4 °C)  HR:  [] 66  Resp:  [16-18] 18  BP: (112-160)/() 124/68  SpO2:  [91 %-97 %] 97 %  Temp (24hrs), Av 9 °F (36 6 °C), Min:97 6 °F (36 4 °C), Max:98 2 °F (36 8 °C)  Current: Temperature: 97 6 °F (36 4 °C)    Physical Exam:   General Appearance:  Alert, interactive, nontoxic, no acute distress  Throat: Oropharynx moist without lesions  Lungs:   Clear to auscultation bilaterally; no wheezes, rhonchi or rales; respirations unlabored   Heart:  RRR; no murmur   Abdomen:   Soft, no focal tenderness at present, non-distended, positive bowel sounds  Extremities: No clubbing, cyanosis or edema   : No jensen, no SPT   Skin: No new rashes or lesions  New Right arm PICC site nontender and left hand IV site nontender  No draining wounds noted         Labs, Imaging, & Other studies:   All pertinent labs and imaging studies were personally reviewed  Results from last 7 days   Lab Units 22  0546 22  0522   WBC Thousand/uL 7 81 9 77 8 66   HEMOGLOBIN g/dL 10 4* 10 3* 9 3*   PLATELETS Thousands/uL 393* 404* 307     Results from last 7 days   Lab Units 22  0546 22  0529 22  0529 22  0427 22  0427 22  0538 22  0536 22  0546 22  0546 22   SODIUM mmol/L 139  --  143  --  139   < > 134*   < > 134*   < > 134*   POTASSIUM mmol/L 3 7   < > 3 9   < > 3 7   < > 3 7   < > 4 6   < > 3 9   CHLORIDE mmol/L 104   < > 108   < > 105   < > 100   < > 104   < > 97*   CO2 mmol/L 29   < > 28   < > 27   < > 29   < > 23   < > 26   BUN mg/dL 4*   < > 6   < > 5   < > 9   < > 11   < > 13   CREATININE mg/dL 0 98   < > 1 13   < > 1 03   < > 1 09   < > 1 25   < > 1 34*   EGFR ml/min/1 73sq m 104   < > 87   < > 98   < > 91   < > 77   < > 71   CALCIUM mg/dL 8 1*   < > 8 0*   < > 7 7*   < > 8 1*   < > 7 3*   < > 8 7   AST U/L  --   --   --   --   --   --  19  --  39  --  27   ALT U/L  --   --   --   --   --   --  29   < > 35   < > 44   ALK PHOS U/L  --   --   --   --   --   --  71   < > 76   < > 104    < > = values in this interval not displayed  Results from last 7 days   Lab Units 02/25/22  1130 02/25/22  1119 02/23/22  2259 02/23/22  2250 02/23/22  2205   BLOOD CULTURE  No Growth at 72 hrs  No Growth at 72 hrs   Streptococcus parasanguinis*  Staphylococcus hominis*  Streptococcus mitis oralis group* Streptococcus mitis oralis group*  Staphylococcus hominis*  Streptococcus salivarius*  --    GRAM STAIN RESULT   --   --  Gram positive cocci in chains*  Gram positive cocci in clusters* Gram positive cocci in chains*  Gram positive cocci in clusters*  --    C DIFF TOXIN B BY PCR   --   --   --   --  Negative         Results from last 7 days   Lab Units 02/23/22  2157   CRP mg/L 16 0*     Results from last 7 days   Lab Units 02/25/22  0536   FERRITIN ng/mL 257

## 2022-03-01 NOTE — PROCEDURES
PICC Line Insertion    Date/Time: 3/1/2022 11:54 AM  Performed by: Calvin Villavicencio MD  Authorized by: Amparo Rodney DO     Patient location:  IR  Other Assisting Provider: No    Consent:     Consent obtained:  Written    Consent given by:  Patient    Risks discussed:  Arterial puncture, incorrect placement, nerve damage, pneumothorax, bleeding and infection    Alternatives discussed:  No treatment, delayed treatment, alternative treatment, observation and referral  Universal protocol:     Procedure explained and questions answered to patient or proxy's satisfaction: yes      Relevant documents present and verified: yes      Test results available and properly labeled: yes      Radiology Images displayed and confirmed  If images not available, report reviewed: yes      Required blood products, implants, devices, and special equipment available: yes      Site/side marked: yes      Immediately prior to procedure, a time out was called: yes      Patient identity confirmed:  Verbally with patient, provided demographic data and arm band  Pre-procedure details:     Hand hygiene: Hand hygiene performed prior to insertion      Sterile barrier technique: All elements of maximal sterile technique followed      Skin preparation:  ChloraPrep    Skin preparation agent: Skin preparation agent completely dried prior to procedure    Indications:     PICC line indications: long term antibiotics    Anesthesia (see MAR for exact dosages):      Anesthesia method:  Local infiltration    Local anesthetic:  Lidocaine 1% w/o epi  Procedure details:     Location:  Brachial    Vessel type: vein      Laterality:  Right    Approach: percutaneous technique used      Patient position:  Flat    Procedural supplies:  Single lumen    Catheter size:  4 Fr    Landmarks identified: yes      Ultrasound guidance: yes      Ultrasound image availability:  Images available in PACS    Sterile ultrasound techniques: Sterile gel and sterile probe covers were used      Number of attempts:  1    Successful placement: yes      Total catheter length (cm):  37    Catheter out on skin (cm):  0    Arm circumference:  35  Post-procedure details:     Post-procedure:  Dressing applied and securement device placed    Assessment:  Blood return through all ports and placement verified by x-ray    Post-procedure complications: none      Patient tolerance of procedure:   Tolerated well, no immediate complications    Observer: Yes      Observer name:  Hoa Blair RN

## 2022-03-01 NOTE — PROGRESS NOTES
[address]  Progress Note - Celestino Abad 1995, 27 y o  male MRN: [id number]  Unit/Bed#: [address] [place] [address] Encounter: [id number]  Primary Care Provider: Maya Suarez DO   Date and time admitted to hospital: 2/23/2022  8:06 PM    * Enteritis  Assessment & Plan  Patient has history of UC post colectomy and ileal pouch formation, follows up at [facility] with abdominal pain, nausea/vomiting, diarrhea with hematochezia   CT of the A/P revealed no interval change, stable mild wall thickening of the distal ileum and pouch - may be nonspecific enteritis    Discussed with team at [facility] on admission and they recommended no steroids, c  diff testing and GI consult   Obstructive series without evidence for obstruction   stool C diff negative, bacterial PCR negative   GI following, input appreciated    Initially treated with ciprofloxacin and metronidazole - now discontinued   Continue diet as tolerated   Patient is for pouchoscopy     Positive blood cultures  Assessment & Plan  Blood cultures obtained on admission 2/2 noted to be positive for polymicrobial growth revealing Staphylococcus hominis, Streptococcus parasanguinis, Streptococcus mitis oralis, Streptococcus salivarius  · Continue cefazolin as per ID   · Follow up repeat blood cultures-remains negative so far  · ID evaluation  · Followup ECHO       SIRS (systemic inflammatory response syndrome) (HCC)  Assessment & Plan  As evidenced by fever, tachycardia, leukocytosis  CRP mildly elevated   Likely secondary enteritis and dehydration, bacteremia  Remains afebrile, leukocytosis improved  · Continue antibiotics       Anemia  Assessment & Plan  Microcytic  Downtrending during hospitalization likely dilutional with associated with mild GI loss  No evidence of acute overt bleeding at present  · Continue to monitor  · Iron sat 6%, will initiate iron on discharge      Ankylosing spondylitis ([facility] [address] )  Assessment & Plan  On Rayna YOON (generalized anxiety disorder)  Assessment & Plan  Continue cymbalta         VTE Pharmacologic Prophylaxis:   SCDs    Patient Centered Rounds: I performed bedside rounds with nursing staff today  Discussions with Specialists or Other Care Team Provider: Yes    Education and Discussions with Family / Patient: Yes    Time Spent for Care: 45 minutes  More than 50% of total time spent on counseling and coordination of care as described above  Current Length of Stay: 6 day(s)  Current Patient Status: Inpatient   Certification Statement: The patient will continue to require additional inpatient hospital stay due to pouchoscopy, bacteremia  Discharge Plan: Anticipate discharge in 24-48 hrs to home  Code Status: Level 1 - Full Code    Subjective:   No acute events overnight   Patient with persistent diarrhea which he states is his baseline   Patient is for pouchoscopy today     Objective:     Vitals:   Temp (24hrs), Av 9 °F (36 6 °C), Min:97 6 °F (36 4 °C), Max:98 2 °F (36 8 °C)    Temp:  [97 6 °F (36 4 °C)-98 2 °F (36 8 °C)] 97 6 °F (36 4 °C)  HR:  [] 68  Resp:  [16-18] 18  BP: (111-160)/() 112/63  SpO2:  [91 %-99 %] 97 %  Body mass index is 27 23 kg/m²  Input and Output Summary (last 24 hours): Intake/Output Summary (Last 24 hours) at 3/1/2022 1043  Last data filed at 3/1/2022 0540  Gross per 24 hour   Intake 610 ml   Output 250 ml   Net 360 ml       Physical Exam:   Physical Exam  HENT:      Head: Normocephalic and atraumatic  Mouth/Throat:      Mouth: Mucous membranes are moist    Eyes:      Extraocular Movements: Extraocular movements intact  Cardiovascular:      Rate and Rhythm: Normal rate  Pulmonary:      Effort: Pulmonary effort is normal       Breath sounds: Normal breath sounds  Abdominal:      General: Abdomen is flat  Palpations: Abdomen is soft  Tenderness: There is no abdominal tenderness  Skin:     General: Skin is warm and dry     Neurological: Mental Status: He is alert  Additional Data:     Labs:  Results from last 7 days   Lab Units 03/01/22  0546   WBC Thousand/uL 7 81   HEMOGLOBIN g/dL 10 4*   HEMATOCRIT % 34 9*   PLATELETS Thousands/uL 393*   NEUTROS PCT % 57   LYMPHS PCT % 22   MONOS PCT % 15*   EOS PCT % 4     Results from last 7 days   Lab Units 03/01/22  0546 02/26/22  0538 02/25/22  0536   SODIUM mmol/L 139   < > 134*   POTASSIUM mmol/L 3 7   < > 3 7   CHLORIDE mmol/L 104   < > 100   CO2 mmol/L 29   < > 29   BUN mg/dL 4*   < > 9   CREATININE mg/dL 0 98   < > 1 09   ANION GAP mmol/L 6   < > 5   CALCIUM mg/dL 8 1*   < > 8 1*   ALBUMIN g/dL  --   --  3 0*   TOTAL BILIRUBIN mg/dL  --   --  0 76   ALK PHOS U/L  --   --  71   ALT U/L  --   --  29   AST U/L  --   --  19   GLUCOSE RANDOM mg/dL 88   < > 83    < > = values in this interval not displayed  Results from last 7 days   Lab Units 02/24/22  0734   POC GLUCOSE mg/dl 89         Results from last 7 days   Lab Units 02/23/22  2259   LACTIC ACID mmol/L 1 7       Lines/Drains:  Invasive Devices  Report    Peripheral Intravenous Line            Peripheral IV 02/28/22 Left;Ventral (anterior) Forearm 1 day                      Imaging: Reviewed radiology reports from this admission including: xray(s)    Recent Cultures (last 7 days):   Results from last 7 days   Lab Units 02/25/22  1130 02/25/22  1119 02/23/22  2259 02/23/22  2250 02/23/22  2205   BLOOD CULTURE  No Growth at 72 hrs  No Growth at 72 hrs   Streptococcus parasanguinis*  Staphylococcus hominis*  Streptococcus mitis oralis group* Streptococcus mitis oralis group*  Staphylococcus hominis*  Streptococcus salivarius*  --    GRAM STAIN RESULT   --   --  Gram positive cocci in chains*  Gram positive cocci in clusters* Gram positive cocci in chains*  Gram positive cocci in clusters*  --    C DIFF TOXIN B BY PCR   --   --   --   --  Negative       Last 24 Hours Medication List:   Current Facility-Administered Medications Medication Dose Route Frequency Provider Last Rate    acetaminophen  650 mg Rectal Q4H PRN Thomas Cap, CRNP      cefazolin  2,000 mg Intravenous Q8H Wyatt Michael PA-C 2,000 mg (03/01/22 4792)    chlorproMAZINE  25 mg Oral Q6H PRN Lakhwinder Guerrero PA-C      dicyclomine  10 mg Oral Q6H PRN Clive Cid MD      DULoxetine  30 mg Oral Daily Thomas Cap, CRNP      HYDROmorphone  1 mg Intravenous Q3H PRN Clive Cid MD      promethazine  25 mg Intravenous Q6H PRN Thomas Cap, CRNP          Today, Patient Was Seen By: Miguel Mehta DO    **Please Note: This note may have been constructed using a voice recognition system  **

## 2022-03-01 NOTE — PLAN OF CARE
Problem: PAIN - ADULT  Goal: Verbalizes/displays adequate comfort level or baseline comfort level  Description: Interventions:  - Encourage patient to monitor pain and request assistance  - Assess pain using appropriate pain scale  - Administer analgesics based on type and severity of pain and evaluate response  - Implement non-pharmacological measures as appropriate and evaluate response  - Consider cultural and social influences on pain and pain management  - Notify physician/advanced practitioner if interventions unsuccessful or patient reports new pain  Outcome: Progressing     Problem: GASTROINTESTINAL - ADULT  Goal: Minimal or absence of nausea and/or vomiting  Description: INTERVENTIONS:  - Administer IV fluids if ordered to ensure adequate hydration  - Maintain NPO status until nausea and vomiting are resolved  - Administer ordered antiemetic medications as needed  - Provide nonpharmacologic comfort measures as appropriate  - Advance diet as tolerated, if ordered  - Consider nutrition services referral to assist patient with adequate nutrition and appropriate food choices  Outcome: Progressing  Goal: Maintains or returns to baseline bowel function  Description: INTERVENTIONS:  - Assess bowel function  - Encourage oral fluids to ensure adequate hydration  - Administer IV fluids if ordered to ensure adequate hydration  - Administer ordered medications as needed  - Encourage mobilization and activity  - Consider nutritional services referral to assist patient with adequate nutrition and appropriate food choices  Outcome: Progressing  Goal: Maintains adequate nutritional intake  Description: INTERVENTIONS:  - Monitor percentage of each meal consumed  - Identify factors contributing to decreased intake, treat as appropriate  - Assist with meals as needed  - Monitor I&O, weight, and lab values if indicated  - Obtain nutrition services referral as needed  Outcome: Progressing

## 2022-03-01 NOTE — QUICK NOTE
Spoke to [employer] inflammatory bowel [address] concerning GI treatment plan to proceed with lower endoscopy today for further evaluation of J pouch  She is okay with GI proceeding to evaluate J pouch with endoscopy  Quinn Murphy reported that on previous evaluation of J-pouch 10 months ago there was no stricture found  Explained to her that patient and was concerned since he was only moving his bowels a small amount that there may be a stricture in the past and requested further evaluation  Quinn Murphy  reported that if patient needs further assess since the Inflammatory [id number] [facility] is available to help him

## 2022-03-01 NOTE — APP STUDENT NOTE
XIOMARA STUDENT  Inpatient Progress Note for TRAINING ONLY  Not Part of Legal Medical Record       Progress Note - Jamarcus Moreno 27 y o  male MRN: [id number]    Unit/Bed#: [address] [place] [address] Encounter: [id number]      Assessment:  1  SIRS  2  Enteritis  3  Positive blood cultures  4  Abdominal Pain   5  Ankylosing spondylitis   6  Generalized anxiety disorder    Plan:  1  Patient had fever, tachycardia, and leukocytosis in the ED after having many episodes of diarrhea              -Lactic acid was normal               -CRP elevated at 16 0 on admission              -Possibly secondary to enteritis                -Fever, leukocytosis, and tachycardia have resolved       -Originally on Flagyl, Cipro, and Vanco which was transitioned to Ancef  2  Positive blood cultures               -First set of blood cultures grew strep mitis oralis, staph hominis, strep salivarus, and strep parasanguinis              -Repeat blood cultures show no growth at 72 hours    -Transitioned from IV Vancomycin to IV Ancef              -Consulted ID   -ECHO to be completed 3/1/22   -Per ID, if ECHO is negative continue IV Ancef (2 weeks total treatment)  3  Patient presented to the ED 2 days prior to admission with chief complaint of abdominal pain, nausea, and vomiting   Patient with history of UC and is s/p ileal pouch formation   Returned to ED on 2/23 after having 30 episodes of diarrhea which became bloody just prior to presentation                -CT on 2/21 revealed evidence of enteritis              -Repeat CT on 2/23 revealed stable mild wall thickening of distal ileum and pouch with no significant  change from previous CT              -His treatment was discussed with his team at [address], who recommended C diff testing, GI consult, and no steroids               -Infectious vs inflammation               -C diff was negative               -Stool enteric panel and fecal leukocytes negative               -CBC has trended down to 7 81 from 15 95 on admission              -Cipro and Flagyl were discontinued 2/26 due to negative enteric panel               -Supportive care with Bentyl, Zofran, Thorazine, and Phenergan               -An obstructive series was obtained for nausea, increased abdominal pain, and difficulty defecating and showed findings consistent with ileus or enteritis  No evidence of progressive obstruction                -Currently NPO              -Pouchoscopy to be done 3/1/22   4  Patient notes increasing and persistent cramping abdominal pain, nausea, and new difficulty having bowel movements                -Possible etiologies include stricture, enteritis, or secondary to analgesic medications              -Obstruction series ordered but showed no evidence of progressive obstruction               -Pouchoscopy to be done 3/1/22  5  On Hortensia Mash              -Last dose 2/23              -Continue to hold   6  Continue Cymbalta    Subjective:   Patient reports persistent abdominal pain that is better than on admission  Reports it is decreasing in frequency  No longer having difficulty evacuating his bowels, but reports feeling like his bowel movements are incomplete  Distension has improved  Reports still getting waves of nausea but no vomiting  Patient had around 7 loose bowel movements in the past 24 hours  Baseline is around 7  Currently NPO for colonoscopy today  Tolerated clears yesterday but reports not having an appetite  Urinating, ambulating, and sleeping without difficulty  Reports developing an itchy rash yesterday that has since improved  Denies fevers, chills, chest pain, SOB, blood in stool, melena, mucus in stool, or swelling in extremities  Objective:     Vitals: Blood pressure 112/63, pulse 68, temperature 97 6 °F (36 4 °C), temperature source Oral, resp  rate 18, height 5' 9" (1 753 m), weight 83 6 kg (184 lb 6 4 oz), SpO2 97 %  ,Body mass index is 27 23 kg/m²        Intake/Output Summary (Last 24 hours) at 3/1/2022 1212  Last data filed at 3/1/2022 0540  Gross per 24 hour   Intake 610 ml   Output 250 ml   Net 360 ml       Physical Exam: General appearance: alert and oriented, in no acute distress  Head: Normocephalic, without obvious abnormality, atraumatic  Eyes: No scleral icterus or conjunctival injection   Ears: External ears without abnormality   Nose: no discharge  Throat: MMM  Lungs: clear to auscultation bilaterally  Heart: regular rate and rhythm  Abdomen: Bowel sounds in all 4 quadrants  Soft, non-distended  Tender to palpation in LLQ and RLQ  No guarding, rigidity, or rebound   Extremities: extremities normal, warm and well-perfused; no cyanosis, clubbing, or edema  Skin: Papular rash on posterior medial left and right thigh     Invasive Devices  Report    Peripheral Intravenous Line            Peripheral IV 02/28/22 Left;Ventral (anterior) Forearm 1 day                Lab, Imaging and other studies: I have personally reviewed pertinent reports      VTE Pharmacologic Prophylaxis: RX contraindicated due to: Rectal bleeding   VTE Mechanical Prophylaxis: sequential compression device

## 2022-03-01 NOTE — ANESTHESIA PREPROCEDURE EVALUATION
Procedure:  COLONOSCOPY    Relevant Problems   ANESTHESIA (within normal limits)      CARDIO (within normal limits)      /RENAL   (+) SARAHI (acute kidney injury) (HCC)      HEMATOLOGY   (+) Anemia      MUSCULOSKELETAL   (+) Ankylosing spondylitis (HCC)   (+) Left-sided low back pain without sciatica      NEURO/PSYCH   (+) CAR (generalized anxiety disorder)   (+) History of ulcerative colitis      PULMONARY (within normal limits)             Anesthesia Plan  ASA Score- 2     Anesthesia Type- IV sedation with anesthesia with ASA Monitors  Additional Monitors:   Airway Plan:           Plan Factors-Exercise tolerance (METS): >4 METS  Chart reviewed  Existing labs reviewed  Patient summary reviewed  Patient is not a current smoker  Induction-     Postoperative Plan-     Informed Consent- Anesthetic plan and risks discussed with patient  I personally reviewed this patient with the CRNA  Discussed and agreed on the Anesthesia Plan with the CRNA  Perry Bonilla

## 2022-03-02 ENCOUNTER — APPOINTMENT (OUTPATIENT)
Dept: NON INVASIVE DIAGNOSTICS | Facility: HOSPITAL | Age: 27
DRG: 394 | End: 2022-03-02
Payer: COMMERCIAL

## 2022-03-02 LAB
AORTIC ROOT: 3.1 CM
BACTERIA BLD CULT: NORMAL
BACTERIA BLD CULT: NORMAL
E WAVE DECELERATION TIME: 194 MS
E/A RATIO: 2.5
FRACTIONAL SHORTENING: 27 (ref 28–44)
INTERVENTRICULAR SEPTUM IN DIASTOLE (PARASTERNAL SHORT AXIS VIEW): 0.9 CM
INTERVENTRICULAR SEPTUM: 0.9 CM (ref 0.54–1)
LAAS-AP4: 17.5 CM2
LEFT ATRIUM SIZE: 4.1 CM
LEFT ATRIUM VOLUME INDEX (MOD BIPLANE): 20.5
LEFT INTERNAL DIMENSION IN SYSTOLE: 3.8 CM (ref 3–4.54)
LEFT VENTRICULAR INTERNAL DIMENSION IN DIASTOLE: 5.2 CM (ref 4.93–7.34)
LEFT VENTRICULAR POSTERIOR WALL IN END DIASTOLE: 0.9 CM (ref 0.52–0.99)
LEFT VENTRICULAR STROKE VOLUME: 69 ML
LVSV (TEICH): 69 ML
MV E'TISSUE VEL-LAT: 14 CM/S
MV E'TISSUE VEL-SEP: 13 CM/S
MV PEAK A VEL: 0.38 M/S
MV PEAK E VEL: 95 CM/S
MV STENOSIS PRESSURE HALF TIME: 56 MS
MV VALVE AREA P 1/2 METHOD: 3.9
RIGHT VENTRICLE ID DIMENSION: 3.4 CM
SL CV LV EF: 55
SL CV LV EF: 55
SL CV PED ECHO LEFT VENTRICLE DIASTOLIC VOLUME (MOD BIPLANE) 2D: 132 ML
SL CV PED ECHO LEFT VENTRICLE SYSTOLIC VOLUME (MOD BIPLANE) 2D: 63 ML
Z-SCORE OF INTERVENTRICULAR SEPTUM IN END DIASTOLE: 1.09
Z-SCORE OF LEFT VENTRICULAR DIMENSION IN END DIASTOLE: -1.46
Z-SCORE OF LEFT VENTRICULAR DIMENSION IN END SYSTOLE: 0.24
Z-SCORE OF LEFT VENTRICULAR POSTERIOR WALL IN END DIASTOLE: 1.2

## 2022-03-02 PROCEDURE — 93312 ECHO TRANSESOPHAGEAL: CPT

## 2022-03-02 PROCEDURE — 93312 ECHO TRANSESOPHAGEAL: CPT | Performed by: INTERNAL MEDICINE

## 2022-03-02 PROCEDURE — 99232 SBSQ HOSP IP/OBS MODERATE 35: CPT | Performed by: INTERNAL MEDICINE

## 2022-03-02 PROCEDURE — C9113 INJ PANTOPRAZOLE SODIUM, VIA: HCPCS | Performed by: STUDENT IN AN ORGANIZED HEALTH CARE EDUCATION/TRAINING PROGRAM

## 2022-03-02 PROCEDURE — 93325 DOPPLER ECHO COLOR FLOW MAPG: CPT | Performed by: INTERNAL MEDICINE

## 2022-03-02 PROCEDURE — 93306 TTE W/DOPPLER COMPLETE: CPT | Performed by: INTERNAL MEDICINE

## 2022-03-02 PROCEDURE — 99232 SBSQ HOSP IP/OBS MODERATE 35: CPT | Performed by: STUDENT IN AN ORGANIZED HEALTH CARE EDUCATION/TRAINING PROGRAM

## 2022-03-02 PROCEDURE — 93321 DOPPLER ECHO F-UP/LMTD STD: CPT | Performed by: INTERNAL MEDICINE

## 2022-03-02 RX ORDER — PANTOPRAZOLE SODIUM 40 MG/1
40 INJECTION, POWDER, FOR SOLUTION INTRAVENOUS
Status: DISCONTINUED | OUTPATIENT
Start: 2022-03-02 | End: 2022-03-03 | Stop reason: HOSPADM

## 2022-03-02 RX ORDER — PROPOFOL 10 MG/ML
INJECTION, EMULSION INTRAVENOUS AS NEEDED
Status: DISCONTINUED | OUTPATIENT
Start: 2022-03-02 | End: 2022-03-02

## 2022-03-02 RX ORDER — LIDOCAINE HYDROCHLORIDE 10 MG/ML
INJECTION, SOLUTION EPIDURAL; INFILTRATION; INTRACAUDAL; PERINEURAL AS NEEDED
Status: DISCONTINUED | OUTPATIENT
Start: 2022-03-02 | End: 2022-03-02

## 2022-03-02 RX ORDER — SODIUM CHLORIDE 9 MG/ML
INJECTION, SOLUTION INTRAVENOUS CONTINUOUS PRN
Status: DISCONTINUED | OUTPATIENT
Start: 2022-03-02 | End: 2022-03-02

## 2022-03-02 RX ORDER — HYDROMORPHONE HCL/PF 1 MG/ML
0.5 SYRINGE (ML) INJECTION
Status: DISCONTINUED | OUTPATIENT
Start: 2022-03-02 | End: 2022-03-03 | Stop reason: HOSPADM

## 2022-03-02 RX ADMIN — PROPOFOL 20 MG: 10 INJECTION, EMULSION INTRAVENOUS at 10:58

## 2022-03-02 RX ADMIN — PROMETHAZINE HYDROCHLORIDE 25 MG: 25 INJECTION INTRAMUSCULAR; INTRAVENOUS at 15:47

## 2022-03-02 RX ADMIN — PROPOFOL 100 MG: 10 INJECTION, EMULSION INTRAVENOUS at 10:57

## 2022-03-02 RX ADMIN — PANTOPRAZOLE SODIUM 40 MG: 40 INJECTION, POWDER, FOR SOLUTION INTRAVENOUS at 19:02

## 2022-03-02 RX ADMIN — ALPRAZOLAM 0.25 MG: 0.25 TABLET ORAL at 19:49

## 2022-03-02 RX ADMIN — LIDOCAINE HYDROCHLORIDE 50 MG: 10 INJECTION, SOLUTION EPIDURAL; INFILTRATION; INTRACAUDAL; PERINEURAL at 10:56

## 2022-03-02 RX ADMIN — HYDROMORPHONE HYDROCHLORIDE 1 MG: 1 INJECTION, SOLUTION INTRAMUSCULAR; INTRAVENOUS; SUBCUTANEOUS at 21:58

## 2022-03-02 RX ADMIN — PROPOFOL 20 MG: 10 INJECTION, EMULSION INTRAVENOUS at 11:02

## 2022-03-02 RX ADMIN — HYDROMORPHONE HYDROCHLORIDE 1 MG: 1 INJECTION, SOLUTION INTRAMUSCULAR; INTRAVENOUS; SUBCUTANEOUS at 15:47

## 2022-03-02 RX ADMIN — HYDROMORPHONE HYDROCHLORIDE 1 MG: 1 INJECTION, SOLUTION INTRAMUSCULAR; INTRAVENOUS; SUBCUTANEOUS at 09:37

## 2022-03-02 RX ADMIN — CEFAZOLIN SODIUM 2000 MG: 2 SOLUTION INTRAVENOUS at 14:05

## 2022-03-02 RX ADMIN — CEFAZOLIN SODIUM 2000 MG: 2 SOLUTION INTRAVENOUS at 05:23

## 2022-03-02 RX ADMIN — HYDROMORPHONE HYDROCHLORIDE 0.5 MG: 1 INJECTION, SOLUTION INTRAMUSCULAR; INTRAVENOUS; SUBCUTANEOUS at 19:45

## 2022-03-02 RX ADMIN — SODIUM CHLORIDE: 0.9 INJECTION, SOLUTION INTRAVENOUS at 10:54

## 2022-03-02 RX ADMIN — HYDROMORPHONE HYDROCHLORIDE 1 MG: 1 INJECTION, SOLUTION INTRAMUSCULAR; INTRAVENOUS; SUBCUTANEOUS at 18:44

## 2022-03-02 RX ADMIN — CEFAZOLIN SODIUM 2000 MG: 2 SOLUTION INTRAVENOUS at 21:24

## 2022-03-02 RX ADMIN — HYDROMORPHONE HYDROCHLORIDE 1 MG: 1 INJECTION, SOLUTION INTRAMUSCULAR; INTRAVENOUS; SUBCUTANEOUS at 06:35

## 2022-03-02 RX ADMIN — HYDROCORTISONE 100 MG: 100 ENEMA RECTAL at 21:24

## 2022-03-02 RX ADMIN — PROPOFOL 20 MG: 10 INJECTION, EMULSION INTRAVENOUS at 11:00

## 2022-03-02 RX ADMIN — PROPOFOL 20 MG: 10 INJECTION, EMULSION INTRAVENOUS at 11:04

## 2022-03-02 RX ADMIN — PROPOFOL 120 MG: 10 INJECTION, EMULSION INTRAVENOUS at 10:56

## 2022-03-02 RX ADMIN — PROMETHAZINE HYDROCHLORIDE 25 MG: 25 INJECTION INTRAMUSCULAR; INTRAVENOUS at 06:35

## 2022-03-02 RX ADMIN — HYDROMORPHONE HYDROCHLORIDE 1 MG: 1 INJECTION, SOLUTION INTRAMUSCULAR; INTRAVENOUS; SUBCUTANEOUS at 12:34

## 2022-03-02 RX ADMIN — DULOXETINE HYDROCHLORIDE 30 MG: 30 CAPSULE, DELAYED RELEASE ORAL at 14:05

## 2022-03-02 NOTE — UTILIZATION REVIEW
Continued Stay Review    Date: 3/2/22                          Current Patient Class: inpatient  Current Level of Care: med surg  HPI:26 y o  male initially admitted on 2/23/22     Assessment/Plan:   Acute enteritis, Polymicrobic(Streptococcus parasanguis, salivarius and mitis oralis bacteremia) bacteremia  S/p Pouchoscopy done 03/01/2022 which showed evidence of pouchitis with multiple small ulcerations in narrowing at ileal anal anastomotic site, status post TTS balloon dilation  Remains on IVABT per ID  BUCKY & echo today  Vital Signs:   /58   Pulse 65   Temp (!) 97 3 °F (36 3 °C)   Resp 15   Ht 5' 9" (1 753 m)   Wt 83 5 kg (184 lb)   SpO2 100%   BMI 27 17 kg/m²     Pertinent Labs/Diagnostic Results:   XR abdomen obstruction series   (02/28 1319)      1  Bowel gas pattern most suggestive of ileus or enteritis  No evidence for progressive obstruction  2   No acute cardiopulmonary findings  CT abdomen pelvis with contrast   (02/24 0042)      No significant interval change  Stable mild wall thickening of the distal ileum and pouch which may be due to nonspecific enteritis  3/1  Echo=    Left Ventricle: Left ventricular cavity size is normal  Wall thickness is normal  The left ventricular ejection fraction is 55% by visual estimation  Systolic function is normal  Wall motion is normal  Diastolic function is normal for age    Mitral Valve: There is trace regurgitation    Tricuspid Valve: There is no indirect evidence of pulmonary hypertension    Patient has a mobile echodensity which is attached to sub valvular structures  Differential diagnosis could be redundant cardia but cannot rule out small vegetation consider BUCKY  No significant valvulopathy is noted  3/2  BUCKY=    Left Ventricle: Left ventricular cavity size is normal  Wall thickness is normal  The left ventricular ejection fraction is 55% by visual estimation   Systolic function is normal  Wall motion is normal    Atrial Septum: There is no atrial septal defect by color Doppler and saline contrast  No patent foramen ovale confirmed at rest     Left Atrial Appendage: There is normal function    Aortic Valve: The aortic valve is trileaflet  The leaflets are mildly thickened  The leaflets are not calcified  The leaflets exhibit normal mobility  No typical vegetation noted no significant associated valvulopathy    Mitral Valve: There is mild focal thickening  Of subvalvular apparatus noted there is a echodensity noted attached to chordee most likely redundant cardia but cannot rule out small vegetation  There is no significant associated valvulopathy  Discussed with ID    Echodensity noted on mitral valve is not typical for vegetation differential diagnosis could be redundant chordae versus small vegetation  It has to be correlated clinically  If he patient continues to have recurrent bacteremia may need a follow-up imaging study  Results from last 7 days   Lab Units 03/01/22  0916   SARS-COV-2  Negative     Results from last 7 days   Lab Units 03/01/22  0546 02/28/22  0529 02/27/22  0427 02/26/22  0538 02/26/22  0538 02/25/22  0536 02/25/22  0536   WBC Thousand/uL 7 81 9 77 8 66   < > 7 02   < > 10 92*   HEMOGLOBIN g/dL 10 4* 10 3* 9 3*  --  9 5*  --  10 6*   HEMATOCRIT % 34 9* 36 0* 31 2*  --  31 8*  --  36 4*   PLATELETS Thousands/uL 393* 404* 307   < > 278   < > 334   NEUTROS ABS Thousands/µL 4 55  --  5 95  --  4 28   < > 8 10*    < > = values in this interval not displayed       Results from last 7 days   Lab Units 03/01/22  0546 02/28/22  0529 02/27/22  0427 02/26/22  0538 02/25/22  0536 02/24/22  0546 02/24/22  0546   SODIUM mmol/L 139 143 139 137 134*   < > 134*   POTASSIUM mmol/L 3 7 3 9 3 7 3 5 3 7   < > 4 6   CHLORIDE mmol/L 104 108 105 102 100   < > 104   CO2 mmol/L 29 28 27 27 29   < > 23   ANION GAP mmol/L 6 7 7 8 5   < > 7   BUN mg/dL 4* 6 5 7 9   < > 11   CREATININE mg/dL 0 98 1 13 1 03 1 01 1 09 < > 1 25   EGFR ml/min/1 73sq m 104 87 98 100 91   < > 77   CALCIUM mg/dL 8 1* 8 0* 7 7* 7 8* 8 1*   < > 7 3*   MAGNESIUM mg/dL  --   --   --   --  1 8  --  1 6   PHOSPHORUS mg/dL  --   --   --   --  2 7  --   --     < > = values in this interval not displayed       Results from last 7 days   Lab Units 02/25/22  0536 02/24/22  0546 02/23/22 2019   AST U/L 19 39 27   ALT U/L 29 35 44   ALK PHOS U/L 71 76 104   TOTAL PROTEIN g/dL 6 4 6 5 8 1   ALBUMIN g/dL 3 0* 3 1* 4 1   TOTAL BILIRUBIN mg/dL 0 76 0 83 0 76     Results from last 7 days   Lab Units 02/24/22  0734   POC GLUCOSE mg/dl 89     Results from last 7 days   Lab Units 03/01/22  0546 02/28/22  0529 02/27/22  0427 02/26/22  0538 02/25/22  0536 02/24/22  0546 02/23/22  2019   GLUCOSE RANDOM mg/dL 88 84 85 86 83 88 96     Results from last 7 days   Lab Units 02/23/22  2259   LACTIC ACID mmol/L 1 7     Results from last 7 days   Lab Units 02/25/22  0536   FERRITIN ng/mL 257     Results from last 7 days   Lab Units 02/23/22  2019   LIPASE u/L 116     Results from last 7 days   Lab Units 02/23/22  2157   CRP mg/L 16 0*     Results from last 7 days   Lab Units 02/24/22  0546 02/23/22  2209   CLARITY UA  Clear Clear   COLOR UA  Yellow Yellow   SPEC GRAV UA  1 010 1 025   PH UA  6 0 6 0   GLUCOSE UA mg/dl Negative Negative   KETONES UA mg/dl Negative Trace*   BLOOD UA  Negative Trace-Intact*   PROTEIN UA mg/dl Negative Negative   NITRITE UA  Negative Negative   BILIRUBIN UA  Negative Interference- unable to analyze*   UROBILINOGEN UA E U /dl 0 2 0 2   LEUKOCYTES UA  Negative Negative   WBC UA /hpf  --  None Seen   RBC UA /hpf  --  0-1   BACTERIA UA /hpf  --  Occasional   EPITHELIAL CELLS WET PREP /hpf  --  None Seen   MUCUS THREADS   --  Moderate*     Results from last 7 days   Lab Units 03/01/22  0916   INFLUENZA A PCR  Negative   INFLUENZA B PCR  Negative   RSV PCR  Negative     Results from last 7 days   Lab Units 02/23/22  2205   C DIFF TOXIN B BY PCR  Negative Results from last 7 days   Lab Units 02/25/22  0252   SALMONELLA SP PCR  None Detected   SHIGELLA SP/ENTEROINVASIVE E  COLI (EIEC)  None Detected   CAMPYLOBACTER SP (JEJUNI AND COLI)  None Detected   SHIGA TOXIN 1/SHIGA TOXIN 2  None Detected     Results from last 7 days   Lab Units 02/25/22  1130 02/25/22  1119 02/23/22  2259 02/23/22  2250   BLOOD CULTURE  No Growth After 4 Days  No Growth After 4 Days  Streptococcus parasanguinis*  Staphylococcus hominis*  Streptococcus mitis oralis group* Streptococcus mitis oralis group*  Staphylococcus hominis*  Streptococcus salivarius*   GRAM STAIN RESULT   --   --  Gram positive cocci in chains*  Gram positive cocci in clusters* Gram positive cocci in chains*  Gram positive cocci in clusters*     Medications:   cefazolin, 2,000 mg, Intravenous, Q8H  DULoxetine, 30 mg, Oral, Daily  hydrocortisone, 100 mg, Rectal, HS    PRN Meds:  acetaminophen, 650 mg, Rectal, Q4H PRN  ALPRAZolam, 0 25 mg, Oral, Daily PRN  chlorproMAZINE, 25 mg, Oral, Q6H PRN  dicyclomine, 10 mg, Oral, Q6H PRN  HYDROmorphone, 1 mg, Intravenous, Q3H PRN  promethazine, 25 mg, Intravenous, Q6H PRN    Discharge Plan: d    Network Utilization Review Department  ATTENTION: Please call with any questions or concerns to [phone] and carefully listen to the prompts so that you are directed to the right person  All voicemails are confidential   [facility] all requests for admission clinical reviews, approved or denied determinations and any other requests to dedicated fax number below belonging to the campus where the patient is receiving treatment   List of dedicated fax numbers for the Facilities:  [address] DENIALS (Administrative/Medical Necessity) [phone]   [address] (Maternity/NICU/Pediatrics) 261 [facility]  [address] [facility] [place] [address] [address] [place] [address] [address] [place] [address] [phone]

## 2022-03-02 NOTE — APP STUDENT NOTE
XIOM[facility] STUDENT  Inpatient Progress Note for TRAINING ONLY  Not Part of Legal Medical Record       Progress Note - Gabe Sierra 27 y o  male MRN: [id number]    Unit/Bed#: [address] [place] [address] Encounter: [id number]      Assessment:  1  SIRS  2  Enteritis  3  Positive blood cultures  4  Intestinal Stricture   5  Ankylosing spondylitis   6  Generalized anxiety disorder    Plan:  1  Patient had fever, tachycardia, and leukocytosis in the ED after having many episodes of diarrhea              -Lactic acid was normal               -CRP elevated at 16 0 on admission              -Possibly secondary to enteritis                -Fever, leukocytosis, and tachycardia have resolved               -Originally on Flagyl, Cipro, and Vanco which was transitioned to Ancef  2  Positive blood cultures               -First set of blood cultures grew strep mitis oralis, staph hominis, strep salivarus, and strep parasanguinis              -Repeat blood cultures show no growth at 4 days                -Transitioned from IV Vancomycin to IV Ancef              -Consulted ID              -TTE completed 3/1/22 showed possible mitral valve vegetation  Scheduled for BUCKY 3/2              -Per ID, if ECHO is negative continue IV Ancef (2 weeks total treatment)  3  Patient presented to the ED 2 days prior to admission with chief complaint of abdominal pain, nausea, and vomiting   Patient with history of UC and is s/p ileal pouch formation   Returned to ED on 2/23 after having 30 episodes of diarrhea which became bloody just prior to presentation                -CT on 2/21 revealed evidence of enteritis              -Repeat CT on 2/23 revealed stable mild wall thickening of distal ileum and pouch with no significant  change from previous CT              -His treatment was discussed with his team at [facility], who recommended C diff testing, GI consult, and no steroids               -Infectious vs inflammation               -C diff was negative               -Stool enteric panel and fecal leukocytes negative               -CBC has trended down to 7 81 from 15 95 on admission              -Cipro and Flagyl were discontinued 2/26 due to negative enteric panel               -Supportive care with Bentyl, Zofran, Thorazine, and Phenergan               -An obstructive series was obtained for nausea, increased abdominal pain, and difficulty defecating and showed findings consistent with ileus or enteritis  No evidence of progressive obstruction                -Pouchoscopy done 3/1/22 showed a stricture with slight narrowing at the ileoanastomotic site  There also was evidence of poucitis with multiple small ulcerations throughout ileum and J pouch  Stricture was dilated   -May resume low fiber, low residue diet after BUCKY   4  Patient notes increasing and persistent cramping abdominal pain, nausea, and new difficulty having bowel movements                -Obstruction series ordered but showed no evidence of progressive obstruction               -Pouchoscopy done 3/1/22 revealed stricture at ileoanatomotic site which was dilated    -Symptoms have improved since dilation   5  On Raynald               -Last dose 2/23              -Continue to hold   6  Continue Cymbalta    Subjective:   Patient dies any overnight events  Reports still having cramping abdominal pain that has improved since admission  Reports improved bowel movements since pouchoscopy  He has had three bowel movements yesterday that were looser but his normal consistency  Was able to eat and drink yesterday without any issues  Did have some nausea this morning but no vomiting  Ambulating, urinating, and sleeping without difficulty  Denies any fevers, chills, diaphoresis, chest pain, SOB, melena, BRBPR, mucus in stool, or leg swelling  Objective:     Vitals: Blood pressure 115/72, pulse 63, temperature (!) 97 3 °F (36 3 °C), resp  rate 16, height 5' 9" (1 753 m), weight 83 5 kg (184 lb), SpO2 97 %  ,Body mass index is 27 17 kg/m²  Intake/Output Summary (Last 24 hours) at 3/2/2022 1330  Last data filed at 3/2/2022 0126  Gross per 24 hour   Intake 410 ml   Output --   Net 410 ml       Physical Exam: General appearance: alert and oriented, in no acute distress  Head: Normocephalic, without obvious abnormality, atraumatic  Eyes: No scleral icterus or conjunctival injection   Ears: External ears without abnormality   Nose: no discharge  Throat: Moist mucous membranes   Lungs: clear to auscultation bilaterally  Heart: regular rate and rhythm  Abdomen: Bowel sounds in all 4 quadrants  Soft, non-distended  Tender to palpation in RLQ and LLQ  No guarding, rigidity, or rebound  Extremities: extremities normal, warm and well-perfused; no cyanosis, clubbing, or edema     Invasive Devices  Report    Peripherally Inserted Central Catheter Line            PICC Line 03/01/22 Right Brachial <1 day                Lab, Imaging and other studies: I have personally reviewed pertinent reports      VTE Pharmacologic Prophylaxis: RX contraindicated due to: Rectal bleeding   VTE Mechanical Prophylaxis: sequential compression device

## 2022-03-02 NOTE — ASSESSMENT & PLAN NOTE
Patient has history of UC post colectomy and ileal pouch formation, follows up at [facility] with abdominal pain, nausea/vomiting, diarrhea with hematochezia   CT of the A/P revealed no interval change, stable mild wall thickening of the distal ileum and pouch - may be nonspecific enteritis    Discussed with team at [facility] on admission and they recommended no steroids, c  diff testing and GI consult   Obstructive series without evidence for obstruction   stool C diff negative, bacterial PCR negative   GI following, input appreciated    Initially treated with ciprofloxacin and metronidazole - now discontinued   Continue diet as tolerated   S/p pouchoscopy with dilation

## 2022-03-02 NOTE — SEDATION DOCUMENTATION
BUCKY completed by Dr Marlene Jacobs  Report given to primary RN  Patient transferred to inpatient room via bed post procedure

## 2022-03-02 NOTE — PROGRESS NOTES
1230  Progress Note - [facility]  Jamie Hatfield 27 y o  male MRN: [id number]    Unit/Bed#: 1990 [facility] Encounter: [id number]      Assessment/Plan:  1  Abdominal pain associated with nausea and vomiting  25-year-old male with history of ankylosing spondylitis on xeljanz and ulcerative colitis status post proctocolectomy and J-pouch formation in 2015 who presented with acute nausea, vomiting, and abdominal pain  CT scan showed no significant interval change  Stable mild wall thickening of the distal ileum and pouch which may be due to nonspecific enteritis  No bowel obstruction  Stable distension of the rectal pouch  Patient noted to have leukocytosis on admission as well as elevated temperature  Leukocytosis has resolved, white blood count 9 77  Remains afebrile  Hemoglobin 10 3 and stable  Preliminary blood cultures grew Gram-positive cocci and chains and clusters    Repeat blood cultures drawn  C diff negative  Stool for bacterial panel negative  Pouchoscopy done 03/01/2022 which showed evidence of pouchitis with multiple small ulcerations and narrowing at ileal anal anastomotic site, status post TTS balloon dilation  Results of pouchoscopy reviewed with patient  Patient had BUCKY today, awaiting results  Patient reports symptoms are improving but he continues with mid lower abdominal tenderness to palpation  Patient reports bowel movement yesterday after colonoscopy no blood noted in stool  Continues with intermittent nausea  -Await pathology results  -Continue antibiotics per infectious disease provider    -Pain management per attending   -Low residue/low fiber diet  -Phenergan as needed for nausea  -Continue hydrocortisone enema 100 mg daily at bedtime  -Follow-up with [address] [place] [address],[address] [facility] in 2 weeks after discharge  Subjective:   Sitting in bed in no acute distress  Mother at bedside    Has been NPO today for BUCKY,just returned from procedure not too long ago and awaiting meal  Denies  vomiting, acid reflux, heartburn  Patient reports he had a bowel movement yesterday after colonoscopy not associated with blood  Patient reports abdominal pain has improved but still remains tender to palpation in mid lower abdominal area  Objective:     Vitals: Blood pressure 115/72, pulse 63, temperature (!) 97 3 °F (36 3 °C), resp  rate 16, height 5' 9" (1 753 m), weight 83 5 kg (184 lb), SpO2 97 %  ,Body mass index is 27 17 kg/m²  Intake/Output Summary (Last 24 hours) at 3/2/2022 0917  Last data filed at 3/2/2022 0523  Gross per 24 hour   Intake 410 ml   Output --   Net 410 ml       Physical Exam: Physical Exam  Vitals and nursing note reviewed  Constitutional:       General: He is not in acute distress  Cardiovascular:      Rate and Rhythm: Normal rate and regular rhythm  Pulses: Normal pulses  Heart sounds: Normal heart sounds  Pulmonary:      Effort: Pulmonary effort is normal  No respiratory distress  Breath sounds: Normal breath sounds  No stridor  No wheezing, rhonchi or rales  Abdominal:      General: Bowel sounds are normal  There is no distension  Palpations: Abdomen is soft  There is no mass  Tenderness: There is abdominal tenderness  There is no guarding or rebound  Hernia: No hernia is present  Comments: Mild tenderness to palpation in mid lower abdomen  Musculoskeletal:      Right lower leg: No edema  Left lower leg: No edema  Skin:     General: Skin is warm and dry  Capillary Refill: Capillary refill takes less than 2 seconds  Coloration: Skin is not jaundiced or pale  Neurological:      Mental Status: He is alert and oriented to person, place, and time     Psychiatric:         Mood and Affect: Mood normal          Invasive Devices  Report    Peripherally Inserted Central Catheter Line            PICC Line 03/01/22 Right Brachial <1 day                Current Facility-Administered Medications:     acetaminophen (TYLENOL) rectal suppository 650 mg, 650 mg, Rectal, Q4H PRN, [place] Colon, 650 mg at 02/24/22 0004    ALPRAZolam Marvie See) tablet 0 25 mg, 0 25 mg, Oral, Daily PRN, Jitendra Solis, DO, 0 25 mg at 03/01/22 1648    ceFAZolin (ANCEF) IVPB (premix in dextrose) 2,000 mg 50 mL, 2,000 mg, Intravenous, Q8H, Lazarus Derry, PA-C, Last Rate: 100 mL/hr at 03/02/22 0523, 2,000 mg at 03/02/22 0523    chlorproMAZINE (THORAZINE) tablet 25 mg, 25 mg, Oral, Q6H PRN, Kiesha Pickering PA-C, 25 mg at 02/27/22 1520    dicyclomine (BENTYL) capsule 10 mg, 10 mg, Oral, Q6H PRN, Chandler Douglas MD    DULoxetine (CYMBALTA) delayed release capsule 30 mg, 30 mg, Oral, Daily, [place] Colon, 30 mg at 03/01/22 0853    hydrocortisone (CORTENEMA) enema 100 mg, 100 mg, Rectal, HS, [place] Schulz, 100 mg at 03/01/22 2130    HYDROmorphone (DILAUDID) injection 1 mg, 1 mg, Intravenous, Q3H PRN, Chandler Douglas MD, 1 mg at 03/02/22 0635    promethazine (PHENERGAN) injection 25 mg, 25 mg, Intravenous, Q6H PRN, [place] Colon, 25 mg at 03/02/22 3677    Lab Results:   Recent Results (from the past 24 hour(s))   Echo complete w/ contrast if indicated    Collection Time: 03/01/22 11:23 AM   Result Value Ref Range    LVIDd 5 20 4 93 - 7 34 cm    LVIDS 3 80 3 00 - 4 54 cm    IVSd 0 90 cm    LVPWd 0 90 0 52 - 0 99 cm    FS 27 28 - 44 %    MV E' Tissue Velocity Septal 13 cm/s    MV E' Tissue Velocity Lateral 14 cm/s    E wave deceleration time 194 ms    MV Peak E Yifan 95 cm/s    MV Peak A Yifan 0 38 m/s    RVID d 3 4 cm    LA size 4 1 cm    MV stenosis pressure 1/2 time 56 ms    MV valve area p 1/2 method 3 93 cm2    Ao root 3 10 cm    Left ventricular stroke volume (2D) 69 00 mL    IVS 0 9 0 54 - 1 00 cm    LEFT VENTRICLE SYSTOLIC VOLUME (MOD BIPLANE) 2D 63 mL    LV DIASTOLIC VOLUME (MOD BIPLANE) 2D 132 mL    Left Atrium Area-systolic Four Chamber 38 4 cm2    LVSV, 2D 69 mL    ZLVPWD 1 20     ZLVIDS 0 24     ZLVIDD -1 46     ZIVSD 1 09 Imaging Studies: Colonoscopy    Result Date: 3/1/2022  Narrative: [facility] Operating Room [address] [place] [address] [phone] DATE OF SERVICE: 3/01/22 PHYSICIAN(S): Fahad Bains MD Proceduralist Procedure :  Pouchoscopy along with TTS balloon dilatation with multiple biopsies INDICATION: Stricture intestinal ([address] ) POST-OP DIAGNOSIS: See the impression below  HISTORY: Prior colonoscopy: Less than 3 years ago  It is being repeated at an interval of less than 3 years because: This colonoscopy is being performed for a diagnostic indication BOWEL PREPARATION: Miralax/Magnesium Citrate; Enema PREPROCEDURE: Informed consent was obtained for the procedure, including sedation  Risks including but not limited to bleeding, infection, perforation, adverse drug reaction and aspiration were explained in detail  Also explained about less than 100% sensitivity with the exam and other alternatives  The patient was placed in the left lateral decubitus position  DETAILS OF PROCEDURE: Patient was taken to the procedure room where a time out was performed to confirm correct patient and correct procedure  The patient underwent monitored anesthesia care, which was administered by an anesthesia professional  The patient's blood pressure, heart rate, level of consciousness, oxygen and respirations were monitored throughout the procedure  A digital rectal exam was performed  A perianal exam was performed  The scope was introduced through the anus and advanced to the terminal ileum  Retroflexion was performed in the rectum  The quality of bowel preparation was evaluated using the [facility] Bowel Preparation Scale with scores of: right colon = not assessed, transverse colon = not assessed, left colon = 3  The total BBPS score was 3  Bowel prep was adequate  The patient experienced no blood loss  The procedure was not difficult  The patient tolerated the procedure well   There were no apparent complications  ANESTHESIA INFORMATION: ASA: II Anesthesia Type: IV Sedation with Anesthesia MEDICATIONS: No administrations occurring from 1401 to 1440 on 03/01/22 FINDINGS: History of severe ulcerative colitis, status post proctocolectomy, status post J-pouch  Pouchoscopy examination shows stricture with slight narrowing at ileo anal anastomotic site  Liquid stool in the pouch which was suction it out  There is evidence of pouchitis with multiple small ulceration throughout in the ileum and NJ pouch, multiple biopsies were done  With the use of TTS balloon 18, 19 and 20 mm size balloon, stricture site was dilated Moderate, localized edematous, erythematous and ulcerated mucosa in the terminal ileum; performed cold forceps biopsy EVENTS: Procedure Events Event Event Time ENDO CECUM REACHED 3/1/2022  2:28 PM ENDO SCOPE OUT TIME 3/1/2022  2:39 PM SPECIMENS: ID Type Source Tests Collected by Time Destination 1 : Terminal Ileum Bx Tissue Terminal Ileum TISSUE EXAM Alana Bowden MD 3/1/2022  2:24 PM  EQUIPMENT: Colonoscope -     Impression: 1  Evidence of pouchitis with multiple small ulceration and narrowing at ileo anal  anastomotic site, status post TTS balloon dilatation RECOMMENDATION: Await pathology results Hydrocortisone enema Resume diet  Alana Bowden MD     XR abdomen obstruction series    Result Date: 2/28/2022  Narrative: OBSTRUCTION SERIES INDICATION:   Abdominal distension, nausea  History of colectomy  COMPARISON:  CTs dated 2/23/2022 and 2/21/2022  EXAM PERFORMED/VIEWS:  XR ABDOMEN OBSTRUCTION SERIES FINDINGS: Lung volumes are normal   No focal consolidation  No effusion  No pneumothorax  Cardiomediastinal silhouette is normal   Normal pulmonary vasculature  Central gas-filled loops of small bowel status post colectomy and ileoanal anastomosis  Ileal pouch pouch measures up to 8 6 cm in caliber  This is unchanged from priors without progressive distention   There are a few air-fluid levels but they are nondifferential (less than 2 5 cm height difference in a single loop)  Gas extends to the sigmoid colon  Chain sutures at the rectosigmoid colon  Paucity of gas projecting over the distal ileoanal anastomosis  No free air or suspicious air-fluid levels  No pathologic calcifications or soft tissue masses evident  Osseous structures are unremarkable  Impression: 1  Bowel gas pattern most suggestive of ileus or enteritis  No evidence for progressive obstruction  2   No acute cardiopulmonary findings  Workstation performed: [id number]     CT abdomen pelvis with contrast    Result Date: 2/24/2022  Narrative: CT ABDOMEN AND PELVIS WITH IV CONTRAST INDICATION:   Sepsis  COMPARISON:  CT of abdomen pelvis on February 21, 2022  TECHNIQUE:  CT examination of the abdomen and pelvis was performed  Axial, sagittal, and coronal 2D reformatted images were created from the source data and submitted for interpretation  Radiation dose length product (DLP) for this visit:  756 79 mGy-cm   This examination, like all CT scans performed in the [facility], was performed utilizing techniques to minimize radiation dose exposure, including the use of iterative  reconstruction and automated exposure control  IV Contrast:  100 mL of iohexol (OMNIPAQUE) Enteric Contrast:  Enteric contrast was not administered  FINDINGS: ABDOMEN LOWER CHEST:  No clinically significant abnormality identified in the visualized lower chest  LIVER/BILIARY TREE:  Unremarkable  GALLBLADDER:  No calcified gallstones  No pericholecystic inflammatory change  SPLEEN:  Unremarkable  PANCREAS:  Unremarkable  ADRENAL GLANDS:  Unremarkable  KIDNEYS/URETERS:  Unremarkable  No hydronephrosis  STOMACH AND BOWEL:  Status post colectomy and ileal pouch anal anastomosis  Stable mild wall thickening of the distal ileum and pouch  No bowel obstruction  Stable distention of the rectal pouch    Stable gaseous distention of a loop of bowel in the upper abdomen  APPENDIX:  Absent ABDOMINOPELVIC CAVITY:  No ascites  No pneumoperitoneum  Stable enlarged right external iliac lymph node measuring 1 5 cm in short axis (series 2, image 69)  Stable right para-aortic lymph noted measuring 1 2 cm in short axis (series 2, and 49) VESSELS:  Unremarkable for patient's age  PELVIS REPRODUCTIVE ORGANS:  Unremarkable for patient's age  URINARY BLADDER:  Unremarkable  ABDOMINAL WALL/INGUINAL REGIONS:  Unremarkable  OSSEOUS STRUCTURES:  No acute fracture or destructive osseous lesion  Impression: No significant interval change  Stable mild wall thickening of the distal ileum and pouch which may be due to nonspecific enteritis  Workstation performed: [id number]     CT abdomen pelvis w contrast    Result Date: 2/21/2022  Narrative: CT ABDOMEN AND PELVIS WITH IV CONTRAST INDICATION:   llq pain history of crohn's/pancolitis  COMPARISON:  January 2, 2022 TECHNIQUE:  CT examination of the abdomen and pelvis was performed  Axial, sagittal, and coronal 2D reformatted images were created from the source data and submitted for interpretation  Radiation dose length product (DLP) for this visit:  170 99 mGy-cm   This examination, like all CT scans performed in the [facility], was performed utilizing techniques to minimize radiation dose exposure, including the use of iterative  reconstruction and automated exposure control  IV Contrast:  100 mL of iohexol (OMNIPAQUE) Enteric Contrast:  Enteric contrast was not administered  FINDINGS: ABDOMEN LOWER CHEST:  No clinically significant abnormality identified in the visualized lower chest  LIVER/BILIARY TREE:  Unremarkable  GALLBLADDER:  Contracted  No calcified stones  SPLEEN:  Unremarkable  PANCREAS:  Unremarkable  ADRENAL GLANDS:  Unremarkable  KIDNEYS/URETERS:  Unremarkable  No hydronephrosis  STOMACH AND BOWEL: As stated previously: Patient is status post colectomy and ileal pouch-anal anastomosis  There is no evidence of obstruction  The previous mild bowel wall thickening and mucosal hyperemia in right lower quadrant small bowel creating the ileal pouch persists, (currently best seen on image 2/61-2/79)  Most likely chronic inflammatory enteritis  APPENDIX:  Removed ABDOMINOPELVIC CAVITY:  Stable 1 2 cm aortocaval node (image 2/50) and a stable 1 5 cm right external iliac node  (Image 2/68)  Both have demonstrated long-term stability  Trace fluid in the right pelvis in the obturator region  No free air  VESSELS:  Unremarkable for patient's age  PELVIS REPRODUCTIVE ORGANS:  Unremarkable for patient's age  URINARY BLADDER:  Unremarkable  ABDOMINAL WALL/INGUINAL REGIONS:  Unremarkable  OSSEOUS STRUCTURES:  No acute fracture or destructive osseous lesion  Impression: Patient is status post colectomy and ileal pouch-anal anastomosis  Similar appearance compared from prior exam with persistent mild small bowel wall thickening involving the distal small bowel creating the pouch, but with less pronounced mucosal enhancement  This likely represents chronic inflammatory enteritis  No obstruction or perforation  Trace amounts of pelvic fluid is seen in the right obturator location  Persistent aortocaval and right external iliac adenopathy, which have demonstrated long-term stability  Workstation performed: [id number]     IR PICC line placement single lumen (preferred for home anitbiotics/medications)    Result Date: 3/1/2022  Narrative: PERCUTANEOUSLY INSERTED CENTRAL VENOUS CATHETER PLACEMENT HISTORY:   Patient in need of a PICC line for IV antibiotics as an outpatient  FLUORO TIME: 0 2 min NUMBER OF IMAGES:  One fluoroscopic image PROCEDURE:  The patient was brought to the Interventional Radiology Suite and identified verbally and by wrist band  The right brachial vein was evaluated as a potential access site with ultrasound  The vessel was found to be patent and compressible   The site was prepped and draped in the usual sterile fashion  All elements of maximal sterile barrier technique, cap and mask and sterile gown and sterile gloves and sterile full-body drape and hand hygiene and 2% chlorhexidine for cutaneous antisepsis  Sterile ultrasound technique with sterile gel and sterile probe covers was also utilized  Lidocaine was administered to the skin and a small skin incision was made  Under ultrasound guidance, utilizing sterile ultrasound technique with sterile gel and a sterile probe cover, the right brachial vein was accessed using single wall Seldinger technique  Static images of real time needle entry into the vessel were obtained  This was followed by a  018 guidewire and a sheath and dilator tapered to   018  A 4 [place] single lumen central venous catheter was then advanced under fluoroscopic guidance to the cavoatrial junction  The catheter was cut at 37 cm with 0 cm external length  The position was confirmed fluoroscopically  Blood could be freely aspirated and heparinized saline injected  Patient experienced no untoward reactions  Impression: 1  Status post placement of a 4 [place] single lumen central venous catheter via the right brachial vein with its tip at the cavoatrial junction under ultrasound and fluoroscopic guidance  Workstation performed: [id number] Grijalva      Please Note: "This note has been constructed using a voice recognition system  Therefore there may be syntax, spelling, and/or grammatical errors   Please call if you have any questions  "**

## 2022-03-02 NOTE — PROGRESS NOTES
[address]  Progress Note - [facility] 1995, 27 y o  male MRN: [id number]  Unit/Bed#: 2 [place] [address] Encounter: [id number]  Primary Care Provider: Glenn William DO   Date and time admitted to hospital: 2/23/2022  8:06 PM    * Enteritis  Assessment & Plan  Patient has history of UC post colectomy and ileal pouch formation, follows up at [facility] with abdominal pain, nausea/vomiting, diarrhea with hematochezia   CT of the A/P revealed no interval change, stable mild wall thickening of the distal ileum and pouch - may be nonspecific enteritis    Discussed with team at [facility] on admission and they recommended no steroids, c  diff testing and GI consult   Obstructive series without evidence for obstruction   stool C diff negative, bacterial PCR negative   GI following, input appreciated    Initially treated with ciprofloxacin and metronidazole - now discontinued   Continue diet as tolerated   S/p pouchoscopy with dilation     Positive blood cultures  Assessment & Plan  Blood cultures obtained on admission 2/2 noted to be positive for polymicrobial growth revealing Staphylococcus hominis, Streptococcus parasanguinis, Streptococcus mitis oralis, Streptococcus salivarius  · Continue cefazolin as per ID   · Follow up repeat blood cultures-remains negative so far  · ID evaluation  · S/p BUCKY       SIRS (systemic inflammatory response syndrome) (HCC)  Assessment & Plan  As evidenced by fever, tachycardia, leukocytosis  CRP mildly elevated   Likely secondary enteritis and dehydration, bacteremia  Remains afebrile, leukocytosis improved  · Continue antibiotics       Anemia  Assessment & Plan  Microcytic  Downtrending during hospitalization likely dilutional with associated with mild GI loss  No evidence of acute overt bleeding at present  · Continue to monitor  · Iron sat 6%, will initiate iron on discharge      Ankylosing spondylitis [facility])  Assessment & Plan  On Matthew Bodily    CAR (generalized anxiety disorder)  Assessment & Plan  Continue cymbalta         VTE Pharmacologic Prophylaxis:   SCDs    Patient Centered Rounds: I performed bedside rounds with nursing staff today  Discussions with Specialists or Other Care Team Provider: Yes    Education and Discussions with Family / Patient: Yes    Time Spent for Care: 45 minutes  More than 50% of total time spent on counseling and coordination of care as described above  Current Length of Stay: 7 day(s)  Current Patient Status: Inpatient   Certification Statement: The patient will continue to require additional inpatient hospital stay due to enteritis, bacteremia  Discharge Plan: Anticipate discharge tomorrow to home  Code Status: Level 1 - Full Code    Subjective:   Patient is for BUCKY today     Objective:     Vitals:   Temp (24hrs), Av 6 °F (36 4 °C), Min:97 3 °F (36 3 °C), Max:98 °F (36 7 °C)    Temp:  [97 3 °F (36 3 °C)-98 °F (36 7 °C)] 98 °F (36 7 °C)  HR:  [61-92] 89  Resp:  [14-22] 18  BP: (107-145)/() 145/92  SpO2:  [93 %-100 %] 96 %  Body mass index is 27 35 kg/m²  Input and Output Summary (last 24 hours): Intake/Output Summary (Last 24 hours) at 3/2/2022 1622  Last data filed at 3/2/2022 1108  Gross per 24 hour   Intake 410 ml   Output 0 ml   Net 410 ml       Physical Exam:   Physical Exam  HENT:      Head: Normocephalic and atraumatic  Mouth/Throat:      Mouth: Mucous membranes are moist    Eyes:      Extraocular Movements: Extraocular movements intact  Cardiovascular:      Rate and Rhythm: Normal rate  Pulmonary:      Effort: Pulmonary effort is normal       Breath sounds: Normal breath sounds  Abdominal:      General: Abdomen is flat  Palpations: Abdomen is soft  Tenderness: There is no abdominal tenderness  Skin:     General: Skin is warm and dry  Neurological:      Mental Status: He is alert            Additional Data:     Labs:  Results from last 7 days   Lab Units 22  0546   WBC Thousand/uL 7 81   HEMOGLOBIN g/dL 10 4*   HEMATOCRIT % 34 9*   PLATELETS Thousands/uL 393*   NEUTROS PCT % 57   LYMPHS PCT % 22   MONOS PCT % 15*   EOS PCT % 4     Results from last 7 days   Lab Units 03/01/22  0546 02/26/22  0538 02/25/22  0536   SODIUM mmol/L 139   < > 134*   POTASSIUM mmol/L 3 7   < > 3 7   CHLORIDE mmol/L 104   < > 100   CO2 mmol/L 29   < > 29   BUN mg/dL 4*   < > 9   CREATININE mg/dL 0 98   < > 1 09   ANION GAP mmol/L 6   < > 5   CALCIUM mg/dL 8 1*   < > 8 1*   ALBUMIN g/dL  --   --  3 0*   TOTAL BILIRUBIN mg/dL  --   --  0 76   ALK PHOS U/L  --   --  71   ALT U/L  --   --  29   AST U/L  --   --  19   GLUCOSE RANDOM mg/dL 88   < > 83    < > = values in this interval not displayed  Results from last 7 days   Lab Units 02/24/22  0734   POC GLUCOSE mg/dl 89         Results from last 7 days   Lab Units 02/23/22  2259   LACTIC ACID mmol/L 1 7       Lines/Drains:  Invasive Devices  Report    Peripherally Inserted Central Catheter Line            PICC Line 03/01/22 Right Brachial 1 day                Central Line:  Goal for removal: Will discontinue when meds requiring line are completed  Imaging: Reviewed radiology reports from this admission including: ECHO    Recent Cultures (last 7 days):   Results from last 7 days   Lab Units 02/25/22  1130 02/25/22  1119 02/23/22  2259 02/23/22  2250 02/23/22  2205   BLOOD CULTURE  No Growth After 5 Days  No Growth After 5 Days   Streptococcus parasanguinis*  Staphylococcus hominis*  Streptococcus mitis oralis group* Streptococcus mitis oralis group*  Staphylococcus hominis*  Streptococcus salivarius*  --    GRAM STAIN RESULT   --   --  Gram positive cocci in chains*  Gram positive cocci in clusters* Gram positive cocci in chains*  Gram positive cocci in clusters*  --    C DIFF TOXIN B BY PCR   --   --   --   --  Negative       Last 24 Hours Medication List:   Current Facility-Administered Medications   Medication Dose Route Frequency Provider Last Rate    acetaminophen  650 mg Rectal Q4H PRN KY Colon      ALPRAZolam  0 25 mg Oral Daily PRN Jitendra Solis DO      cefazolin  2,000 mg Intravenous Q8H Lazarus Derry, PA-C 2,000 mg (03/02/22 1405)    chlorproMAZINE  25 mg Oral Q6H PRN Kiesha Pickering PA-C      dicyclomine  10 mg Oral Q6H PRN Chandler Douglas MD      DULoxetine  30 mg Oral Daily [place] Colon      hydrocortisone  100 mg Rectal HS KY Schulz      HYDROmorphone  1 mg Intravenous Q3H PRN Chandler Douglas MD      promethazine  25 mg Intravenous Q6H PRN [place] Colon       Facility-Administered Medications Ordered in Other Encounters   Medication Dose Route Frequency Provider Last Rate    lidocaine (PF)   Intravenous PRN Newfolden Siria, CRNA      propofol   Intravenous PRN Caty Siria, CRNA      sodium chloride   Intravenous Continuous PRN Caty Siria, CRNA          Today, Patient Was Seen By: Itz Rosenberg DO    **Please Note: This note may have been constructed using a voice recognition system  **

## 2022-03-02 NOTE — ANESTHESIA PREPROCEDURE EVALUATION
Procedure:  BUCKY    Relevant Problems   /RENAL   (+) SARAHI (acute kidney injury) ([facility] [address] )      HEMATOLOGY   (+) Anemia      MUSCULOSKELETAL   (+) Ankylosing spondylitis (HCC)   (+) Left-sided low back pain without sciatica      NEURO/PSYCH   (+) CAR (generalized anxiety disorder)   (+) History of ulcerative colitis        Physical Exam    Airway    Mallampati score: II  TM Distance: >3 FB  Neck ROM: full     Dental   No notable dental hx     Cardiovascular      Pulmonary      Other Findings        Anesthesia Plan  ASA Score- 2     Anesthesia Type- IV sedation with anesthesia with ASA Monitors  Additional Monitors:   Airway Plan:           Plan Factors-Exercise tolerance (METS): >4 METS  Chart reviewed  EKG reviewed  Existing labs reviewed  Patient summary reviewed  Patient is not a current smoker  Induction- intravenous  Postoperative Plan- Plan for postoperative opioid use  Informed Consent- Anesthetic plan and risks discussed with patient  I personally reviewed this patient with the CRNA  Discussed and agreed on the Anesthesia Plan with the JEWEL Ceballos

## 2022-03-02 NOTE — CASE MANAGEMENT
Case Management Discharge Planning Note    Patient name Padilla Fine  Location [facility] [address] [place] [address] MRN [id number]  : 1995 Date 3/2/2022       Current Admission Date: 2022  Current Admission Diagnosis:Enteritis   Patient Active Problem List    Diagnosis Date Noted    Anemia 2022    Positive blood cultures 2022    Enteritis 2022    SIRS (systemic inflammatory response syndrome) ([facility] [address] ) 2022    Sepsis ([facility] [address] ) 2022    [facility] 2022    Elevated LFTs 2022    Arm and leg movements, uncontrollable 2021    Seizure-like activity ([facility] ) 2021    Arthralgia 09/15/2020    Sacroiliac joint dysfunction of right side 2020    Left groin pain 2019    Left-sided low back pain without sciatica 15/37/8966    Complications of intestinal pouch ([facility] ) 2019    SARAHI (acute kidney injury) ([facility] ) 2019    History of ulcerative colitis 2019    Hyponatremia 2019    CAR (generalized anxiety disorder) 10/19/2018    BMI 28 0-28 9,adult 10/19/2018    Chronic ulcerative pancolitis ([facility] [address] ) 2018    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[facility] [address] ) 2018    Stricture of rectum 2018      LOS (days): 7  Geometric Mean LOS (GMLOS) (days):   Days to GMLOS:     OBJECTIVE:  Risk of Unplanned Readmission Score: 17       Current admission status: Inpatient   Preferred Pharmacy:   [address] [address]  Phone: [phone] Fax: [phone]    Primary Care Provider: Abigail Holder DO    Primary Insurance: BLUE CROSS  Secondary Insurance:     DISCHARGE DETAILS:    SW placed referral for home IV antibiotics with [facility] home infusion via [facility]  Current script as provided by ID MEENU Hunter attached to referral   SW also placed referral for nursing with [facility] in [place]  SW will continue to follow for discharge planning needs

## 2022-03-02 NOTE — PROGRESS NOTES
Progress Note - Infectious Disease   Geneva Esparza 27 y o  male MRN: [id number]  Unit/Bed#: 2 [address] [address] Encounter: [id number]      Impression/Plan:  1  Sepsis  POA   With fever, tachycardia and leukocytosis   Admission blood cultures are now growing polymicrobic Streptococcus parasanguinous, strep mitis oralis, Streptococcus salivarius and Staph hominis   Acute enteritis possible source of #1/2  -Continues Cefazolin 2 g IV q 8 hours    -follow-up repeat cultures   -monitor temperature and hemodynamics  -serial exam  -recheck CBC and BMP in a m      2  Polymicrobic bacteremia with possible MV endocarditis  While staph hominis could represent skin contaminant, Streptococcus parasanguinous, strep mitis oralis, Streptococcus salivarius in blood cultures in setting of acute enteritis suggests translocation of bowel source of #1/2   02/25/2022 repeat blood cultures negative at 5 days  3/1/22 TTE and 3/2/22 BUCKY both similar appearance: Possible vegetation on MV   -antibiotics as above  -PICC line placed   -Plan to complete 4 week IV antibiotic course through 3/23/22  -monitor temperature and hemodynamics  -serial exam  -recheck CBC and BMP   -script written for home infusion cefazolin, CBC with diff and creatinine weekly, and discontinue PICC line on 03/24/2022   -follow-up in infectious disease office within 2 weeks discharge, office personnel arranging      3  Acute enteritis   In setting abdominal pain and patient presenting with alternating with constipation and bloody bowel  Patient with ulcerative colitis post colectomy with ileal pouch formation   Prior history of pouchitis in 2018     3/1/22 colonoscopy with findings of proctitis with multiple small ulcerations in biopsies taken    Balloon dilatation of stricture performed   -monitor stool output  -antibiotics as above  -ongoing GI follow-up     4  Ankylosing spondylitis on [place] outpatient biologic immunosuppression  -Juanjo Plana hold in patient at present  -symptomatic management per primary care team     Antibiotics:  Cefazolin - abx D7     Above impression and plan discussed in detail with patient, RN, , and primary care team   I spent 35 minutes on the unit floor of which 20 minutes was in counseling/coordination of care as outlined in my note including coordination of outpatient intravenous antibiotics, laboratory follow-up, and working while on outpatient IV antibiotic      Subjective:  Patient has no fever, chills, sweats overnight; no nausea, vomiting; no cough, shortness of breath; no pain  No new symptoms  S/p BUCKY and a little groggy subsequently  He reports some generalized abdominal discomfort status post colonoscopy and dilation procedure yesterday but no point tenderness and he had a bowel movement post colonoscopy yesterday that was okay  He appears to be tolerating IV antibiotic  No further rash eruption    Objective:  Vitals:  Temp:  [97 3 °F (36 3 °C)-98 2 °F (36 8 °C)] 97 6 °F (36 4 °C)  HR:  [61-92] 64  Resp:  [14-22] 15  BP: (107-141)/() 126/67  SpO2:  [93 %-100 %] 98 %  Temp (24hrs), Av 6 °F (36 4 °C), Min:97 3 °F (36 3 °C), Max:98 2 °F (36 8 °C)  Current: Temperature: 97 6 °F (36 4 °C)    Physical Exam:   General Appearance:  Drowsy but arousable from rest, alert, cooperative, nontoxic, no acute distress  Throat: Oropharynx moist without lesions  Lungs:   Clear to auscultation bilaterally; no wheezes, rhonchi or rales; respirations unlabored   Heart:  RRR; no murmur   Abdomen:   Soft, no focal tenderness, non-distended, positive bowel sounds  Extremities: No clubbing, cyanosis or edema   : No Gloria, no SPT   Skin: No new rashes or lesions  right arm PICC site nontender  No draining wounds noted         Labs, Imaging, & Other studies:   All pertinent labs and imaging studies were personally reviewed  Results from last 7 days   Lab Units 22  0546 22  0529 22  0427   WBC Thousand/uL 7  81 9 77 8 66   HEMOGLOBIN g/dL 10 4* 10 3* 9 3*   PLATELETS Thousands/uL 393* 404* 307     Results from last 7 days   Lab Units 03/01/22  0546 02/28/22  0529 02/28/22  0529 02/27/22 0427 02/27/22 0427 02/26/22  0538 02/25/22  0536 02/24/22  0546 02/24/22  0546 02/23/22 2019 02/23/22 2019   SODIUM mmol/L 139  --  143  --  139   < > 134*   < > 134*   < > 134*   POTASSIUM mmol/L 3 7   < > 3 9   < > 3 7   < > 3 7   < > 4 6   < > 3 9   CHLORIDE mmol/L 104   < > 108   < > 105   < > 100   < > 104   < > 97*   CO2 mmol/L 29   < > 28   < > 27   < > 29   < > 23   < > 26   BUN mg/dL 4*   < > 6   < > 5   < > 9   < > 11   < > 13   CREATININE mg/dL 0 98   < > 1 13   < > 1 03   < > 1 09   < > 1 25   < > 1 34*   EGFR ml/min/1 73sq m 104   < > 87   < > 98   < > 91   < > 77   < > 71   CALCIUM mg/dL 8 1*   < > 8 0*   < > 7 7*   < > 8 1*   < > 7 3*   < > 8 7   AST U/L  --   --   --   --   --   --  19  --  39  --  27   ALT U/L  --   --   --   --   --   --  29   < > 35   < > 44   ALK PHOS U/L  --   --   --   --   --   --  71   < > 76   < > 104    < > = values in this interval not displayed  Results from last 7 days   Lab Units 02/25/22  1130 02/25/22  1119 02/23/22 2259 02/23/22 2250 02/23/22 2205   BLOOD CULTURE  No Growth After 4 Days  No Growth After 4 Days   Streptococcus parasanguinis*  Staphylococcus hominis*  Streptococcus mitis oralis group* Streptococcus mitis oralis group*  Staphylococcus hominis*  Streptococcus salivarius*  --    GRAM STAIN RESULT   --   --  Gram positive cocci in chains*  Gram positive cocci in clusters* Gram positive cocci in chains*  Gram positive cocci in clusters*  --    C DIFF TOXIN B BY PCR   --   --   --   --  Negative         Results from last 7 days   Lab Units 02/23/22  2157   CRP mg/L 16 0*     Results from last 7 days   Lab Units 02/25/22  0536   FERRITIN ng/mL 257

## 2022-03-02 NOTE — ASSESSMENT & PLAN NOTE
Blood cultures obtained on admission 2/2 noted to be positive for polymicrobial growth revealing Staphylococcus hominis, Streptococcus parasanguinis, Streptococcus mitis oralis, Streptococcus salivarius  · Continue cefazolin as per ID   · Follow up repeat blood cultures-remains negative so far  · ID evaluation  · S/p BUCKY

## 2022-03-02 NOTE — ANESTHESIA POSTPROCEDURE EVALUATION
Post-Op Assessment Note    CV Status:  Stable  Pain Score: 0    Pain management: adequate     Mental Status:  Alert and awake   Hydration Status:  Euvolemic   PONV Controlled:  Controlled   Airway Patency:  Patent      Post Op Vitals Reviewed: Yes      Staff: Anesthesiologist, CRNA         No complications documented      BP   107/58   Temp     Pulse  65   Resp   18   SpO2   100

## 2022-03-03 VITALS
OXYGEN SATURATION: 96 % | HEART RATE: 68 BPM | BODY MASS INDEX: 27.43 KG/M2 | WEIGHT: 185.19 LBS | SYSTOLIC BLOOD PRESSURE: 108 MMHG | HEIGHT: 69 IN | RESPIRATION RATE: 16 BRPM | TEMPERATURE: 97.3 F | DIASTOLIC BLOOD PRESSURE: 61 MMHG

## 2022-03-03 PROCEDURE — 99239 HOSP IP/OBS DSCHRG MGMT >30: CPT | Performed by: STUDENT IN AN ORGANIZED HEALTH CARE EDUCATION/TRAINING PROGRAM

## 2022-03-03 PROCEDURE — C9113 INJ PANTOPRAZOLE SODIUM, VIA: HCPCS | Performed by: STUDENT IN AN ORGANIZED HEALTH CARE EDUCATION/TRAINING PROGRAM

## 2022-03-03 PROCEDURE — 99232 SBSQ HOSP IP/OBS MODERATE 35: CPT | Performed by: INTERNAL MEDICINE

## 2022-03-03 RX ORDER — CEFAZOLIN SODIUM 2 G/50ML
2000 SOLUTION INTRAVENOUS EVERY 8 HOURS
Refills: 0 | Status: ON HOLD
Start: 2022-03-03 | End: 2022-03-08

## 2022-03-03 RX ORDER — HYDROCORTISONE 100 MG/60ML
100 SUSPENSION RECTAL
Qty: 7 ENEMA | Refills: 0 | Status: SHIPPED | OUTPATIENT
Start: 2022-03-03 | End: 2022-03-12 | Stop reason: HOSPADM

## 2022-03-03 RX ORDER — OXYCODONE HYDROCHLORIDE 5 MG/1
2.5 TABLET ORAL EVERY 6 HOURS PRN
Qty: 10 TABLET | Refills: 0 | Status: SHIPPED | OUTPATIENT
Start: 2022-03-03 | End: 2022-03-12 | Stop reason: HOSPADM

## 2022-03-03 RX ADMIN — PANTOPRAZOLE SODIUM 40 MG: 40 INJECTION, POWDER, FOR SOLUTION INTRAVENOUS at 08:05

## 2022-03-03 RX ADMIN — HYDROMORPHONE HYDROCHLORIDE 0.5 MG: 1 INJECTION, SOLUTION INTRAMUSCULAR; INTRAVENOUS; SUBCUTANEOUS at 11:16

## 2022-03-03 RX ADMIN — PROMETHAZINE HYDROCHLORIDE 25 MG: 25 INJECTION INTRAMUSCULAR; INTRAVENOUS at 13:34

## 2022-03-03 RX ADMIN — DULOXETINE HYDROCHLORIDE 30 MG: 30 CAPSULE, DELAYED RELEASE ORAL at 08:05

## 2022-03-03 RX ADMIN — CEFAZOLIN SODIUM 2000 MG: 2 SOLUTION INTRAVENOUS at 05:34

## 2022-03-03 RX ADMIN — HYDROMORPHONE HYDROCHLORIDE 1 MG: 1 INJECTION, SOLUTION INTRAMUSCULAR; INTRAVENOUS; SUBCUTANEOUS at 01:13

## 2022-03-03 RX ADMIN — HYDROMORPHONE HYDROCHLORIDE 0.5 MG: 1 INJECTION, SOLUTION INTRAMUSCULAR; INTRAVENOUS; SUBCUTANEOUS at 08:05

## 2022-03-03 RX ADMIN — PROMETHAZINE HYDROCHLORIDE 25 MG: 25 INJECTION INTRAMUSCULAR; INTRAVENOUS at 01:12

## 2022-03-03 RX ADMIN — HYDROMORPHONE HYDROCHLORIDE 0.5 MG: 1 INJECTION, SOLUTION INTRAMUSCULAR; INTRAVENOUS; SUBCUTANEOUS at 14:29

## 2022-03-03 RX ADMIN — CEFAZOLIN SODIUM 2000 MG: 2 SOLUTION INTRAVENOUS at 13:31

## 2022-03-03 NOTE — APP STUDENT NOTE
[facility] STUDENT  Inpatient Progress Note for TRAINING ONLY  Not Part of Legal Medical Record       Progress Note - Ben Dallas 27 y o  male MRN: [id number]    Unit/Bed#: [address] [place] [address] Encounter: [id number]      Assessment:  1  SIRS  2  Enteritis  3  Positive blood cultures  4  Intestinal Stricture   5  Ankylosing spondylitis   6  Generalized anxiety disorder    Plan:  1  Patient had fever, tachycardia, and leukocytosis in the ED after having many episodes of diarrhea              -Lactic acid was normal               -CRP elevated at 16 0 on admission              -Possibly secondary to enteritis                -Fever, leukocytosis, and tachycardia have resolved               -Originally on Flagyl, Cipro, and Vanco which was transitioned to Ancef  2  Positive blood cultures               -First set of blood cultures grew strep mitis oralis, staph hominis, strep salivarus, and strep parasanguinis              -Repeat blood cultures show no growth at 4 days                -Transitioned from IV Vancomycin to IV Ancef              -Consulted ID              -TTE completed 3/1/22 showed possible mitral valve vegetation  Scheduled for BUCKY 3/2              -BUCKY on 3/2 showed subvalvular echodensity attached to chordae which is most likely residual cardia but can't rule out vegetation  3  Patient presented to the ED 2 days prior to admission with chief complaint of abdominal pain, nausea, and vomiting   Patient with history of UC and is s/p ileal pouch formation   Returned to ED on 2/23 after having 30 episodes of diarrhea which became bloody just prior to presentation                -CT on 2/21 revealed evidence of enteritis              -Repeat CT on 2/23 revealed stable mild wall thickening of distal ileum and pouch with no significant  change from previous CT              -His treatment was discussed with his team at [facility], who recommended C diff testing, GI consult, and no steroids               -Infectious vs inflammation             -U diff was negative               -Stool enteric panel and fecal leukocytes negative               -CBC trended down to 7  81 from 15 95 on admission              -Cipro and Flagyl were discontinued 2/26 due to negative enteric panel                -Pouchoscopy done 3/1/22 showed a stricture with slight narrowing at the ileoanastomotic site  There also was evidence of poucitis with multiple small ulcerations throughout ileum and J pouch  Stricture was dilated              -On low fiber, low residue diet    -Hydrocortisone enema daily at bedtime    -Follow up in two weeks with [facility]   4  Patient notes increasing and persistent cramping abdominal pain, nausea, and new difficulty having bowel movements                -Obstruction series ordered but showed no evidence of progressive obstruction               -Pouchoscopy done 3/1/22 revealed stricture at ileoanatomotic site which was dilated               -Symptoms have improved since dilation   5  On Jj Spruce              -Last dose 2/23              -Continue to hold   6  Continue Cymbalta    Subjective:   Patient reports overnight having an episode of abdominal cramping and inability to have a bowel movement  Eventually he was able to have a bowel movement and abdominal cramping resolved  Abdominal pain has improved since admission and stricture dilation  Had 2 bowel movements in the past 24 hours, which were his normal consistency  Still having episodes of nausea  Urinating and sleeping without difficulty  Denies fevers, chills, vomiting, melena, hematochezia, mucus in stool, chest pain, palpitations, SOB, or swelling in legs  Objective:     Vitals: Blood pressure 108/61, pulse 68, temperature (!) 97 3 °F (36 3 °C), resp  rate 16, height 5' 9" (1 753 m), weight 84 kg (185 lb 3 oz), SpO2 96 %  ,Body mass index is 27 35 kg/m²        Intake/Output Summary (Last 24 hours) at 3/3/2022 1001  Last data filed at 3/3/2022 0600  Gross per 24 hour Intake 1430 ml   Output 250 ml   Net 1180 ml       Physical Exam: General appearance: alert and oriented, in no acute distress  Head: Normocephalic, without obvious abnormality, atraumatic  Eyes: No scleral icterus or conjunctival injection  Ears: External ears without abnormalities  Nose: no discharge  Throat: Moist mucous membranes   Lungs: clear to auscultation bilaterally  Heart: regular rate and rhythm  Abdomen: soft, non-tender; bowel sounds normal; no masses,  no organomegaly  Extremities: extremities normal, warm and well-perfused; no cyanosis, clubbing, or edema     Invasive Devices  Report    Peripherally Inserted Central Catheter Line            PICC Line 03/01/22 Right Brachial 1 day                Lab, Imaging and other studies: I have personally reviewed pertinent reports      VTE Pharmacologic Prophylaxis: RX contraindicated due to: Rectal bleeding   VTE Mechanical Prophylaxis: sequential compression device

## 2022-03-03 NOTE — PLAN OF CARE
Problem: PAIN - ADULT  Goal: Verbalizes/displays adequate comfort level or baseline comfort level  Description: Interventions:  - Encourage patient to monitor pain and request assistance  - Assess pain using appropriate pain scale  - Administer analgesics based on type and severity of pain and evaluate response  - Implement non-pharmacological measures as appropriate and evaluate response  - Consider cultural and social influences on pain and pain management  - Notify physician/advanced practitioner if interventions unsuccessful or patient reports new pain  Outcome: Progressing     Problem: GASTROINTESTINAL - ADULT  Goal: Minimal or absence of nausea and/or vomiting  Description: INTERVENTIONS:  - Administer IV fluids if ordered to ensure adequate hydration  - Administer ordered antiemetic medications as needed  - Provide nonpharmacologic comfort measures as appropriate  - Advance diet as tolerated, if ordered  - Consider nutrition services referral to assist patient with adequate nutrition and appropriate food choices  Outcome: Progressing  Goal: Maintains or returns to baseline bowel function  Description: INTERVENTIONS:  - Assess bowel function  - Encourage oral fluids to ensure adequate hydration  - Administer ordered medications as needed  - Encourage mobilization and activity  - Consider nutritional services referral to assist patient with adequate nutrition and appropriate food choices  Outcome: Progressing  Goal: Maintains adequate nutritional intake  Description: INTERVENTIONS:  - Monitor percentage of each meal consumed  - Identify factors contributing to decreased intake, treat as appropriate  - Assist with meals as needed  - Monitor I&O, weight, and lab values if indicated  - Obtain nutrition services referral as needed  Outcome: Progressing     Problem: Nutrition/Hydration-ADULT  Goal: Nutrient/Hydration intake appropriate for improving, restoring or maintaining nutritional needs  Description: Monitor and assess patient's nutrition/hydration status for malnutrition  Collaborate with interdisciplinary team and initiate plan and interventions as ordered  Monitor patient's weight and dietary intake as ordered or per policy  Utilize nutrition screening tool and intervene as necessary  Determine patient's food preferences and provide high-protein, high-caloric foods as appropriate       INTERVENTIONS:  - Monitor oral intake, urinary output, labs, and treatment plans  - Assess nutrition and hydration status and recommend course of action  - Evaluate amount of meals eaten  - Assist patient with eating if necessary   - Allow adequate time for meals  - Recommend/ encourage appropriate diets, oral nutritional supplements, and vitamin/mineral supplements  - Order, calculate, and assess calorie counts as needed  - Assess need for intravenous fluids  - Provide specific nutrition/hydration education as appropriate  - Include patient/family/caregiver in decisions related to nutrition  Outcome: Progressing

## 2022-03-03 NOTE — PROGRESS NOTES
Progress Note - [facility]  Thuy Snyder 27 y o  male MRN: [id number]    Unit/Bed#: [address] [place] [address] Encounter: [id number]      Assessment/Plan:  1  Abdominal pain associated with nausea and vomiting  12-year-old male with history of ankylosing spondylitis on xeljanz and ulcerative colitis status post proctocolectomy and J-pouch formation in 2015 who presented with acute nausea, vomiting, and abdominal pain   CT scan showed no significant interval change  Stable mild wall thickening of the distal ileum and pouch which may be due to nonspecific enteritis   No bowel obstruction   Stable distension of the rectal pouch  Patient noted to have leukocytosis on admission as well as elevated temperature   Leukocytosis has resolved, white blood count 9 77   Remains afebrile   Hemoglobin 10 3 and stable   Preliminary blood cultures grew Gram-positive cocci and chains and clusters    Repeat blood cultures drawn   C diff negative   Stool for bacterial panel negative  Pouchoscopy done 03/01/2022 which showed evidence of pouchitis with multiple small ulcerations and narrowing at ileal anal anastomotic site, status post TTS balloon dilation  Results of pouchoscopy reviewed with patient  Patient continues with lower abdominal pain and tenderness to palpation  Patient reports bowel movement yesterday after colonoscopy no blood noted in stool  No bowel movement today  Currently denies nausea or vomiting     -Await pathology results  -Continue antibiotics per infectious disease provider    -Pain management per attending   -Low residue/low fiber diet  -Phenergan as needed for nausea  -Continue hydrocortisone enema 100 mg daily at bedtime  -Follow-up with [address] [place] [address] in 2 weeks after discharge        Subjective:   Lying in bed in no acute distress  Patient reports he has not had a bowel movement today  Patient reports he is having difficulty moving his bowels  Continues to report pain in lower abdomen    Denies nausea, vomiting, acid reflux, heartburn  Tolerating diet appetite remains decreased  Patient reports he is being discharged home today and will [facility]  Objective:     Vitals: Blood pressure 108/61, pulse 68, temperature (!) 97 3 °F (36 3 °C), resp  rate 16, height 5' 9" (1 753 m), weight 84 kg (185 lb 3 oz), SpO2 96 %  ,Body mass index is 27 35 kg/m²  Intake/Output Summary (Last 24 hours) at 3/3/2022 0811  Last data filed at 3/3/2022 0600  Gross per 24 hour   Intake 1430 ml   Output 250 ml   Net 1180 ml       Physical Exam: Physical Exam  Vitals and nursing note reviewed  Constitutional:       General: He is not in acute distress  Cardiovascular:      Rate and Rhythm: Normal rate and regular rhythm  Pulses: Normal pulses  Heart sounds: Normal heart sounds  Pulmonary:      Effort: Pulmonary effort is normal  No respiratory distress  Breath sounds: Normal breath sounds  No stridor  No wheezing, rhonchi or rales  Abdominal:      General: Bowel sounds are normal  There is no distension  Palpations: Abdomen is soft  There is no mass  Tenderness: There is abdominal tenderness  There is no guarding or rebound  Hernia: No hernia is present  Comments: Continues with lower abdominal tenderness to palpation  Musculoskeletal:      Right lower leg: No edema  Left lower leg: No edema  Skin:     General: Skin is warm and dry  Capillary Refill: Capillary refill takes less than 2 seconds  Coloration: Skin is not jaundiced or pale  Neurological:      Mental Status: He is alert and oriented to person, place, and time     Psychiatric:         Mood and Affect: Mood normal          Invasive Devices  Report    Peripherally Inserted Central Catheter Line            PICC Line 03/01/22 Right Brachial 1 day                Current Facility-Administered Medications:     acetaminophen (TYLENOL) rectal suppository 650 mg, 650 mg, Rectal, Q4H Ceci KNIGHT [place], 650 mg at 02/24/22 0004    ALPRAZolam Laureen Bolognese) tablet 0 25 mg, 0 25 mg, Oral, Daily PRN, Jitendra Solis DO, 0 25 mg at 03/02/22 1949    ceFAZolin (ANCEF) IVPB (premix in dextrose) 2,000 mg 50 mL, 2,000 mg, Intravenous, Q8H, Surinder Downey PA-C, Last Rate: 100 mL/hr at 03/03/22 0534, 2,000 mg at 03/03/22 0534    chlorproMAZINE (THORAZINE) tablet 25 mg, 25 mg, Oral, Q6H PRN, Levy Sood PA-C, 25 mg at 02/27/22 1520    dicyclomine (BENTYL) capsule 10 mg, 10 mg, Oral, Q6H PRN, Gabino Still MD    DULoxetine (CYMBALTA) delayed release capsule 30 mg, 30 mg, Oral, Daily, [place] June, 30 mg at 03/03/22 0805    hydrocortisone (CORTENEMA) enema 100 mg, 100 mg, Rectal, HS, [place] Schulz, 100 mg at 03/02/22 2124    HYDROmorphone (DILAUDID) injection 0 5 mg, 0 5 mg, Intravenous, Q3H PRN, Darlin Martinez DO, 0 5 mg at 03/03/22 0805    HYDROmorphone (DILAUDID) injection 1 mg, 1 mg, Intravenous, Q3H PRN, Gabino Still MD, 1 mg at 03/03/22 0113    pantoprazole (PROTONIX) injection 40 mg, 40 mg, Intravenous, Q24H [address], Jitendra Solis DO, 40 mg at 03/03/22 0805    promethazine (PHENERGAN) injection 25 mg, 25 mg, Intravenous, Q6H PRN, [place] June, 25 mg at 03/03/22 7701    Lab Results:   Recent Results (from the past 24 hour(s))   BUCKY    Collection Time: 03/02/22 11:28 AM   Result Value Ref Range    LV EF 55              Imaging Studies: Colonoscopy    Result Date: 3/1/2022  Narrative: [address] [facility] [address] [place], [place] [id number] DATE OF SERVICE: 3/01/22 PHYSICIAN(S): Cathleen Gayle MD Proceduralist Procedure :  Pouchoscopy along with TTS balloon dilatation with multiple biopsies INDICATION: Stricture intestinal ([address] ) POST-OP DIAGNOSIS: See the impression below  HISTORY: Prior colonoscopy: Less than 3 years ago  It is being repeated at an interval of less than 3 years because:  This colonoscopy is being performed for a diagnostic indication BOWEL PREPARATION: Miralax/Magnesium Citrate; Enema PREPROCEDURE: Informed consent was obtained for the procedure, including sedation  Risks including but not limited to bleeding, infection, perforation, adverse drug reaction and aspiration were explained in detail  Also explained about less than 100% sensitivity with the exam and other alternatives  The patient was placed in the left lateral decubitus position  DETAILS OF PROCEDURE: Patient was taken to the procedure room where a time out was performed to confirm correct patient and correct procedure  The patient underwent monitored anesthesia care, which was administered by an anesthesia professional  The patient's blood pressure, heart rate, level of consciousness, oxygen and respirations were monitored throughout the procedure  A digital rectal exam was performed  A perianal exam was performed  The scope was introduced through the anus and advanced to the terminal ileum  Retroflexion was performed in the rectum  The quality of bowel preparation was evaluated using the [facility] Bowel Preparation Scale with scores of: right colon = not assessed, transverse colon = not assessed, left colon = 3  The total BBPS score was 3  Bowel prep was adequate  The patient experienced no blood loss  The procedure was not difficult  The patient tolerated the procedure well  There were no apparent complications  ANESTHESIA INFORMATION: ASA: II Anesthesia Type: IV Sedation with Anesthesia MEDICATIONS: No administrations occurring from 1401 to 1440 on 03/01/22 FINDINGS: History of severe ulcerative colitis, status post proctocolectomy, status post J-pouch  Pouchoscopy examination shows stricture with slight narrowing at ileo anal anastomotic site  Liquid stool in the pouch which was suction it out  There is evidence of pouchitis with multiple small ulceration throughout in the ileum and NJ pouch, multiple biopsies were done    With the use of TTS balloon 18, 19 and 20 mm size balloon, stricture site was dilated Moderate, localized edematous, erythematous and ulcerated mucosa in the terminal ileum; performed cold forceps biopsy EVENTS: Procedure Events Event Event Time ENDO CECUM REACHED 3/1/2022  2:28 PM ENDO SCOPE OUT TIME 3/1/2022  2:39 PM SPECIMENS: ID Type Source Tests Collected by Time Destination 1 : Terminal Ileum Bx Tissue Terminal Ileum TISSUE EXAM Sheeba Blanton MD 3/1/2022  2:24 PM  EQUIPMENT: Colonoscope -     Impression: 1  Evidence of pouchitis with multiple small ulceration and narrowing at ileo anal  anastomotic site, status post TTS balloon dilatation RECOMMENDATION: Await pathology results Hydrocortisone enema Resume diet  Sheeba Blanton MD     XR abdomen obstruction series    Result Date: 2/28/2022  Narrative: OBSTRUCTION SERIES INDICATION:   Abdominal distension, nausea  History of colectomy  COMPARISON:  CTs dated 2/23/2022 and 2/21/2022  EXAM PERFORMED/VIEWS:  XR ABDOMEN OBSTRUCTION SERIES FINDINGS: Lung volumes are normal   No focal consolidation  No effusion  No pneumothorax  Cardiomediastinal silhouette is normal   Normal pulmonary vasculature  Central gas-filled loops of small bowel status post colectomy and ileoanal anastomosis  Ileal pouch pouch measures up to 8 6 cm in caliber  This is unchanged from priors without progressive distention  There are a few air-fluid levels but they are nondifferential (less than 2 5 cm height difference in a single loop)  Gas extends to the sigmoid colon  Chain sutures at the rectosigmoid colon  Paucity of gas projecting over the distal ileoanal anastomosis  No free air or suspicious air-fluid levels  No pathologic calcifications or soft tissue masses evident  Osseous structures are unremarkable  Impression: 1  Bowel gas pattern most suggestive of ileus or enteritis  No evidence for progressive obstruction  2   No acute cardiopulmonary findings   Workstation performed: [id number]     CT abdomen pelvis with contrast    Result Date: 2/24/2022  Narrative: CT ABDOMEN AND PELVIS WITH IV CONTRAST INDICATION:   Sepsis  COMPARISON:  CT of abdomen pelvis on February 21, 2022  TECHNIQUE:  CT examination of the abdomen and pelvis was performed  Axial, sagittal, and coronal 2D reformatted images were created from the source data and submitted for interpretation  Radiation dose length product (DLP) for this visit:  756 79 mGy-cm   This examination, like all CT scans performed in the [facility], was performed utilizing techniques to minimize radiation dose exposure, including the use of iterative  reconstruction and automated exposure control  IV Contrast:  100 mL of iohexol (OMNIPAQUE) Enteric Contrast:  Enteric contrast was not administered  FINDINGS: ABDOMEN LOWER CHEST:  No clinically significant abnormality identified in the visualized lower chest  LIVER/BILIARY TREE:  Unremarkable  GALLBLADDER:  No calcified gallstones  No pericholecystic inflammatory change  SPLEEN:  Unremarkable  PANCREAS:  Unremarkable  ADRENAL GLANDS:  Unremarkable  KIDNEYS/URETERS:  Unremarkable  No hydronephrosis  STOMACH AND BOWEL:  Status post colectomy and ileal pouch anal anastomosis  Stable mild wall thickening of the distal ileum and pouch  No bowel obstruction  Stable distention of the rectal pouch  Stable gaseous distention of a loop of bowel in the upper abdomen  APPENDIX:  Absent ABDOMINOPELVIC CAVITY:  No ascites  No pneumoperitoneum  Stable enlarged right external iliac lymph node measuring 1 5 cm in short axis (series 2, image 69)  Stable right para-aortic lymph noted measuring 1 2 cm in short axis (series 2, and 49) VESSELS:  Unremarkable for patient's age  PELVIS REPRODUCTIVE ORGANS:  Unremarkable for patient's age  URINARY BLADDER:  Unremarkable  ABDOMINAL WALL/INGUINAL REGIONS:  Unremarkable  OSSEOUS STRUCTURES:  No acute fracture or destructive osseous lesion       Impression: No significant interval change  Stable mild wall thickening of the distal ileum and pouch which may be due to nonspecific enteritis  Workstation performed: [id number]     CT abdomen pelvis w contrast    Result Date: 2/21/2022  Narrative: CT ABDOMEN AND PELVIS WITH IV CONTRAST INDICATION:   llq pain history of crohn's/pancolitis  COMPARISON:  January 2, 2022 TECHNIQUE:  CT examination of the abdomen and pelvis was performed  Axial, sagittal, and coronal 2D reformatted images were created from the source data and submitted for interpretation  Radiation dose length product (DLP) for this visit:  170 99 mGy-cm   This examination, like all CT scans performed in the [facility], was performed utilizing techniques to minimize radiation dose exposure, including the use of iterative  reconstruction and automated exposure control  IV Contrast:  100 mL of iohexol (OMNIPAQUE) Enteric Contrast:  Enteric contrast was not administered  FINDINGS: ABDOMEN LOWER CHEST:  No clinically significant abnormality identified in the visualized lower chest  LIVER/BILIARY TREE:  Unremarkable  GALLBLADDER:  Contracted  No calcified stones  SPLEEN:  Unremarkable  PANCREAS:  Unremarkable  ADRENAL GLANDS:  Unremarkable  KIDNEYS/URETERS:  Unremarkable  No hydronephrosis  STOMACH AND BOWEL: As stated previously: Patient is status post colectomy and ileal pouch-anal anastomosis  There is no evidence of obstruction  The previous mild bowel wall thickening and mucosal hyperemia in right lower quadrant small bowel creating the ileal pouch persists, (currently best seen on image 2/61-2/79)  Most likely chronic inflammatory enteritis  APPENDIX:  Removed ABDOMINOPELVIC CAVITY:  Stable 1 2 cm aortocaval node (image 2/50) and a stable 1 5 cm right external iliac node  (Image 2/68)  Both have demonstrated long-term stability  Trace fluid in the right pelvis in the obturator region  No free air  VESSELS:  Unremarkable for patient's age   PELVIS REPRODUCTIVE ORGANS:  Unremarkable for patient's age  URINARY BLADDER:  Unremarkable  ABDOMINAL WALL/INGUINAL REGIONS:  Unremarkable  OSSEOUS STRUCTURES:  No acute fracture or destructive osseous lesion  Impression: Patient is status post colectomy and ileal pouch-anal anastomosis  Similar appearance compared from prior exam with persistent mild small bowel wall thickening involving the distal small bowel creating the pouch, but with less pronounced mucosal enhancement  This likely represents chronic inflammatory enteritis  No obstruction or perforation  Trace amounts of pelvic fluid is seen in the right obturator location  Persistent aortocaval and right external iliac adenopathy, which have demonstrated long-term stability  Workstation performed: [id number]     IR PICC line placement single lumen (preferred for home anitbiotics/medications)    Result Date: 3/1/2022  Narrative: PERCUTANEOUSLY INSERTED CENTRAL VENOUS CATHETER PLACEMENT HISTORY:   Patient in need of a PICC line for IV antibiotics as an outpatient  FLUORO TIME: 0 2 min NUMBER OF IMAGES:  One fluoroscopic image PROCEDURE:  The patient was brought to the Interventional Radiology Suite and identified verbally and by wrist band  The right brachial vein was evaluated as a potential access site with ultrasound  The vessel was found to be patent and compressible  The site was prepped and draped in the usual sterile fashion  All elements of maximal sterile barrier technique, cap and mask and sterile gown and sterile gloves and sterile full-body drape and hand hygiene and 2% chlorhexidine for cutaneous antisepsis  Sterile ultrasound technique with sterile gel and sterile probe covers was also utilized  Lidocaine was administered to the skin and a small skin incision was made   Under ultrasound guidance, utilizing sterile ultrasound technique with sterile gel and a sterile probe cover, the right brachial vein was accessed using single wall Seldinger technique  Static images of real time needle entry into the vessel were obtained  This was followed by a  018 guidewire and a sheath and dilator tapered to   018  A 4 [place] single lumen central venous catheter was then advanced under fluoroscopic guidance to the cavoatrial junction  The catheter was cut at 37 cm with 0 cm external length  The position was confirmed fluoroscopically  Blood could be freely aspirated and heparinized saline injected  Patient experienced no untoward reactions  Impression: 1  Status post placement of a 4 [place] single lumen central venous catheter via the right brachial vein with its tip at the cavoatrial junction under ultrasound and fluoroscopic guidance  Workstation performed: [id number]     Echo complete w/ contrast if indicated    Result Date: 3/2/2022  Narrative: Iza Herzog  Left Ventricle: Left ventricular cavity size is normal  Wall thickness is normal  The left ventricular ejection fraction is 55% by visual estimation  Systolic function is normal  Wall motion is normal  Diastolic function is normal for age    Mitral Valve: There is trace regurgitation    Tricuspid Valve: There is no indirect evidence of pulmonary hypertension    Patient has a mobile echodensity which is attached to sub valvular structures  Differential diagnosis could be redundant cardia but cannot rule out small vegetation consider BUCKY  No significant valvulopathy is noted  BUCKY    Result Date: 3/2/2022  Narrative: Iza Herzog  Left Ventricle: Left ventricular cavity size is normal  Wall thickness is normal  The left ventricular ejection fraction is 55% by visual estimation  Systolic function is normal  Wall motion is normal    Atrial Septum: There is no atrial septal defect by color Doppler and saline contrast  No patent foramen ovale confirmed at rest    Left Atrial Appendage: There is normal function    Aortic Valve: The aortic valve is trileaflet  The leaflets are mildly thickened   The leaflets are not calcified  The leaflets exhibit normal mobility  No typical vegetation noted no significant associated valvulopathy    Mitral Valve: There is mild focal thickening  Of subvalvular apparatus noted there is a echodensity noted attached to chordee most likely redundant cardia but cannot rule out small vegetation  There is no significant associated valvulopathy  Discussed with ID   Echodensity noted on mitral valve is not typical for vegetation differential diagnosis could be redundant chordae versus small vegetation  It has to be correlated clinically  If he patient continues to have recurrent bacteremia may need a follow-up imaging study  [place] [employer]      Please Note: "This note has been constructed using a voice recognition system  Therefore there may be syntax, spelling, and/or grammatical errors   Please call if you have any questions  "**

## 2022-03-03 NOTE — CASE MANAGEMENT
Case Management Discharge Planning Note    Patient name Darrin Goodpasture  Location [facility] 221/[facility] MRN [id number]  : 1995 Date 3/3/2022       Current Admission Date: 2022  Current Admission Diagnosis:Enteritis   Patient Active Problem List    Diagnosis Date Noted    Anemia 2022    Positive blood cultures 2022    Enteritis 2022    SIRS (systemic inflammatory response syndrome) ([facility] ) 2022    Sepsis ([facility] ) 2022    Ankylosing spondylitis ([facility] ) 2022    Elevated LFTs 2022    Arm and leg movements, uncontrollable 2021    Seizure-like activity ([facility] ) 2021    Arthralgia 09/15/2020    Sacroiliac joint dysfunction of right side 2020    Left groin pain 2019    Left-sided low back pain without sciatica     Complications of intestinal pouch ([facility] ) 2019    SARAHI (acute kidney injury) ([facility] ) 2019    History of ulcerative colitis 2019    Hyponatremia 2019    CAR (generalized anxiety disorder) 10/19/2018    BMI 28 0-28 9,adult 10/19/2018    Chronic ulcerative pancolitis ([facility] ) 2018    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[facility] ) 2018    Stricture of rectum 2018      LOS (days): 8  Geometric Mean LOS (GMLOS) (days):   Days to GMLOS:     OBJECTIVE:  Risk of Unplanned Readmission Score: 17     Current admission status: Inpatient   Preferred Pharmacy:   [address] [facility] [address] [address]  Phone: [phone] Fax: [phone]    Primary Care Provider: Ladonna Lozada DO    Primary Insurance: BLUE CROSS  Secondary Insurance:     DISCHARGE DETAILS:    Discharge planning discussed with[de-identified] Patient  Freedom of Choice: Yes  Comments - Freedom of Choice: Discharge is planned for today  Met with pt to review plan  Pt will be returning home with home infusion through [facility]    Pt confirmed that he has spoken to [facility] about treatment plan and is comfortable with all plans  Other Referral/Resources/Interventions Provided:  Interventions: Home Infusion  Referral Comments: SW spoke with Valentino Ferrari at [address] to confirm plans  [facility] will be supplying meds and supplies as well as providing nursing care  Nurse will be visiting pt this evening for teaching for evening dose  [facility] referral has been cancelled      Treatment Team Recommendation: Home (with home infusion)  Discharge Destination Plan[de-identified] Home (with home infusion)  Transport at Discharge : Family

## 2022-03-03 NOTE — DISCHARGE SUMMARY
[address]  Discharge- Tc Collins 1995, 27 y o  male MRN: [id number]  Unit/Bed#: [address] [place] [address] Encounter: [id number]  Primary Care Provider: Charla Ang DO   Date and time admitted to hospital: 2/23/2022  8:06 PM    No new Assessment & Plan notes have been filed under this hospital service since the last note was generated  Service: Hospitalist      Medical Problems             Resolved Problems  Date Reviewed: 2/24/2022    None              Discharging Physician / Practitioner: Kady Rhoades DO  PCP: Charla Ang DO  Admission Date:   Admission Orders (From admission, onward)     Ordered        02/23/22 2221  Inpatient Admission  Once                      Discharge Date: 03/03/22    Consultations During Hospital Stay:  · ID, GI    Procedures Performed:   Pouchoscopy along with TTS balloon dilatation with multiple biopsies    Significant Findings / Test Results:   CT: Patient is status post colectomy and ileal pouch-anal anastomosis  Similar appearance compared from prior exam with persistent mild small bowel wall thickening involving the distal small bowel creating the pouch, but with less pronounced mucosal enhancement  This likely represents chronic inflammatory enteritis  No obstruction or perforation  Trace amounts of pelvic fluid is seen in the right obturator location  Persistent aortocaval and right external iliac adenopathy, which have demonstrated long-term stability  Test Results Pending at Discharge (will require follow up): · Tissue biopsy      Reason for Admission: Enteritis     Hospital Course:   Tc Collins is a 27 y o  male patient who originally presented to the hospital on 2/23/2022 due to abdominal pain  Patient found to have evidence of enteritis on imaging  On admission case had been discussed with team at [facility] who recommended no steroids  Patient was evaluated by GI and underwent pouchoscopy with balloon dilation    Patient also found to be bacteremic with polymicrobial pathogens  Echo and BUCKY showed normal valves however with echodensity in sub AV structures  Overall suspicion was low for endocarditis however since it could not be definitively ruled out decision was made for patient to be treated with 4 weeks of IV antibiotics  Patient improved clinically throughout hospitalization and was optimized for discharge with continuation of antibiotics outpatient  Please see above list of diagnoses and related plan for additional information  Condition at Discharge: fair    Discharge Day Visit / Exam:   Subjective:  No acute events overnight   Vitals: Blood Pressure: 108/61 (03/03/22 0746)  Pulse: 68 (03/03/22 0746)  Temperature: (!) 97 3 °F (36 3 °C) (03/03/22 0746)  Temp Source: Oral (03/02/22 1903)  Respirations: 16 (03/02/22 2251)  Height: 5' 9" (175 3 cm) (03/02/22 1136)  Weight - Scale: 84 kg (185 lb 3 oz) (03/02/22 1136)  SpO2: 96 % (03/03/22 0746)  Exam:   Physical Exam  HENT:      Head: Normocephalic and atraumatic  Mouth/Throat:      Mouth: Mucous membranes are moist    Eyes:      Extraocular Movements: Extraocular movements intact  Cardiovascular:      Rate and Rhythm: Normal rate  Pulmonary:      Effort: Pulmonary effort is normal    Abdominal:      General: Abdomen is flat  Palpations: Abdomen is soft  Tenderness: There is no abdominal tenderness  Skin:     General: Skin is warm and dry  Neurological:      Mental Status: He is alert  Discharge instructions/Information to patient and family:   See after visit summary for information provided to patient and family  Provisions for Follow-Up Care:  See after visit summary for information related to follow-up care and any pertinent home health orders  Disposition:   Home    Planned Readmission: none     Discharge Statement:  I spent greater than 30 minutes discharging the patient  This time was spent on the day of discharge   I had direct contact with the patient on the day of discharge  Greater than 50% of the total time was spent examining patient, answering all patient questions, arranging and discussing plan of care with patient as well as directly providing post-discharge instructions  Additional time then spent on discharge activities  Discharge Medications:  See after visit summary for reconciled discharge medications provided to patient and/or family        **Please Note: This note may have been constructed using a voice recognition system**

## 2022-03-03 NOTE — DISCHARGE INSTRUCTIONS
Follow-up with Renetta Pina in 2 weeks after discharge     Peripherally Inserted Central Catheter     WHAT YOU NEED TO KNOW:   A PICC is an IV placed into a large blood vessel near your heart  It is usually inserted through a blood vessel in your arm  Your PICC may have multiple ports  Ports are tubes where you can inject medicine  A PICC can stay in place for several weeks or months  You may need a PICC to get nutrition, medicine, or fluids  Blood samples can be removed from your PICC and sent to the lab for tests  DISCHARGE INSTRUCTIONS:    [address] and Leni patients,    Contact Interventional Radiology at [phone] PATIENTS: Contact Interventional Radiology at [phone]   [facility] PATIENTS: Contact Interventional Radiology at [phone] if:  · Blood soaks through your bandage  · Your arm or leg feels warm, tender, and painful  It may look swollen and red  · You have trouble moving your arm  · Your catheter falls out  · You have a fever or swelling, redness, pain, or pus where the catheter was inserted  · Persistent nausea or vomiting  · You cannot flush your catheter, or you feel pain when you flush your catheter  · You see a hole or crack in the tubing of your catheter  · You see fluid leaking from the insertion site  · You run out of supplies to care for your catheter  · You have questions or concerns about your condition or care  Transesophageal Echocardiogram     WHAT YOU NEED TO KNOW:   A transesophageal echocardiogram (BUCKY) is a procedure used to check for problems with your heart  It will also show any problems in the blood vessels near your heart  Sound waves are sent to the heart through a tube inserted into your throat  The sound waves show the structure and function of your heart through pictures on a monitor      DISCHARGE INSTRUCTIONS:     No driving for 24 hours, because you had sedation  Rest: Rest until you are fully awake  You may be sleepy for a while after your procedure  If your throat was numbed, do not eat or drink until you are able to swallow normally/ or until directed by your healthcare provider: start with small sips of water to assure you do not choke  If you cough, wait a little longer and then try again  Avoid hot liquids for the day  Follow up with your healthcare provider as directed: Write down your questions so you remember to ask them during your visits  Contact your healthcare provider if:   · You have a fever or chills  · You taste blood in your mouth  · You have a severe sore throat or difficulty swallowing  · You have questions or concerns about your condition or care  Seek care immediately or call 911 if:   · You have any of the following signs of a heart attack:    ? Squeezing, pressure, or pain in your chest that lasts longer than 5 minutes or returns   ? Discomfort or pain in your back, neck, jaw, stomach, or arm    ? Trouble breathing   ? Nausea or vomiting   ?  Lightheadedness or a sudden cold sweat, especially with chest pain or trouble breathing

## 2022-03-03 NOTE — PLAN OF CARE
Problem: GASTROINTESTINAL - ADULT  Goal: Minimal or absence of nausea and/or vomiting  Description: INTERVENTIONS:  - Administer IV fluids if ordered to ensure adequate hydration  - Administer ordered antiemetic medications as needed  - Provide nonpharmacologic comfort measures as appropriate  - Advance diet as tolerated, if ordered  - Consider nutrition services referral to assist patient with adequate nutrition and appropriate food choices  Outcome: Progressing  Note: As per patient its better than before  Goal: Maintains or returns to baseline bowel function  Description: INTERVENTIONS:  - Assess bowel function  - Encourage oral fluids to ensure adequate hydration  - Administer ordered medications as needed  - Encourage mobilization and activity  - Consider nutritional services referral to assist patient with adequate nutrition and appropriate food choices  Outcome: Progressing  Note: Passing gas  Goal: Maintains adequate nutritional intake  Description: INTERVENTIONS:  - Monitor percentage of each meal consumed  - Identify factors contributing to decreased intake, treat as appropriate  - Assist with meals as needed  - Monitor I&O, weight, and lab values if indicated  - Obtain nutrition services referral as needed  Outcome: Progressing  Note: Diet advanced to low fiber from NPO     Problem: Nutrition/Hydration-ADULT  Goal: Nutrient/Hydration intake appropriate for improving, restoring or maintaining nutritional needs  Description: Monitor and assess patient's nutrition/hydration status for malnutrition  Collaborate with interdisciplinary team and initiate plan and interventions as ordered  Monitor patient's weight and dietary intake as ordered or per policy  Utilize nutrition screening tool and intervene as necessary  Determine patient's food preferences and provide high-protein, high-caloric foods as appropriate       INTERVENTIONS:  - Monitor oral intake, urinary output, labs, and treatment plans  - Assess nutrition and hydration status and recommend course of action  - Evaluate amount of meals eaten  - Assist patient with eating if necessary   - Allow adequate time for meals  - Recommend/ encourage appropriate diets, oral nutritional supplements, and vitamin/mineral supplements  - Order, calculate, and assess calorie counts as needed  - Assess need for intravenous fluids  - Provide specific nutrition/hydration education as appropriate  - Include patient/family/caregiver in decisions related to nutrition  Outcome: Progressing  Note: Diet advaced to gi low fiber from NPO

## 2022-03-03 NOTE — PROGRESS NOTES
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  While staph hominis could represent skin contaminant, Streptococcus parasanguinous, strep mitis oralis, Streptococcus salivarius in blood cultures in setting of acute enteritis suggests translocation of bowel source of #1/2   02/25/2022 repeat blood cultures negative at 5 days  3/1/22 TTE and 3/2/22 BUCKY both similar appearance: Possible vegetation on MV   -antibiotics as above  -PICC line placed   -Plan to complete 4 week IV antibiotic course through 3/23/22  -monitor temperature and hemodynamics  -serial exam  -check weekly CBC with diff and creatinine  -script written for home infusion cefazolin, CBC with diff and creatinine weekly, and discontinue PICC line on 03/24/2022   -follow-up in infectious disease office Thursday, 3/17/22 at 11 am      3  Acute enteritis   In setting abdominal pain and patient presenting with alternating with constipation and bloody bowel  Patient with ulcerative colitis post colectomy with ileal pouch formation   Prior history of pouchitis in 2018     3/1/22 colonoscopy with findings of proctitis with multiple small ulcerations in biopsies taken    Balloon dilatation of stricture performed   -monitor stool output  -antibiotics as above  -ongoing GI follow-up     4  Ankylosing spondylitis on Anai Rather outpatient biologic immunosuppression  -Savannah Gonzalez hold in patient at present  -symptomatic management per primary care team     Antibiotics:  Cefazolin - abx D8     Above impression and plan discussed in detail with patient, RN, , and primary care team   I spent 35 minutes on the unit floor of which 20 minutes was in counseling/coordination of care as outlined in my note including coordination of outpatient intravenous antibiotics, laboratory follow-up, and working while on outpatient IV antibiotic  Disposition planning in progress tentatively for after 2 pm Cefazolin dose here      Subjective:  Patient has no fever, chills, sweats overnight; no nausea, vomiting; no cough, shortness of breath; no pain  No new symptoms  He reports abdominal discomfort that resolved after a BM  He appears to be tolerating IV antibiotic   No further rash eruption    Objective:  Vitals:  Temp:  [97 3 °F (36 3 °C)-98 2 °F (36 8 °C)] 97 3 °F (36 3 °C)  HR:  [] 68  Resp:  [16-19] 16  BP: (108-152)/() 108/61  SpO2:  [94 %-97 %] 96 %  Temp (24hrs), Av 8 °F (36 6 °C), Min:97 3 °F (36 3 °C), Max:98 2 °F (36 8 °C)  Current: Temperature: (!) 97 3 °F (36 3 °C)    Physical Exam:   General Appearance:  Alert, interactive, nontoxic, no acute distress  Throat: Oropharynx moist without lesions  Lungs:   Clear to auscultation bilaterally; no wheezes, rhonchi or rales; respirations unlabored   Heart:  RRR; no murmur   Abdomen:   Soft, non-tender, non-distended, positive bowel sounds  Extremities: No clubbing, cyanosis or edema   : No Gloria, no SPT   Skin: No new rashes or lesions  Right arm PICC site nontender  No draining wounds noted         Labs, Imaging, & Other studies:   All pertinent labs and imaging studies were personally reviewed  Results from last 7 days   Lab Units 22   WBC Thousand/uL 7 81 9 77 8 66   HEMOGLOBIN g/dL 10 4* 10 3* 9 3*   PLATELETS Thousands/uL 393* 404* 307     Results from last 7 days   Lab Units 2246 22  042 02/27/22  0427 02/26/22  0538 02/25/22  0536   SODIUM mmol/L 139  --  143  --  139   < > 134*   POTASSIUM mmol/L 3 7   < > 3 9   < > 3 7   < > 3 7   CHLORIDE mmol/L 104   < > 108   < > 105   < > 100   CO2 mmol/L 29   < > 28   < > 27   < > 29   BUN mg/dL 4*   < > 6   < > 5   < > 9   CREATININE mg/dL 0 98   < > 1 13   < > 1 03   < > 1 09   EGFR ml/min/1 73sq m 104   < > 87   < > 98   < > 91   CALCIUM mg/dL 8 1*   < > 8 0*   < > 7 7*   < > 8 1*   AST U/L  --   --   --   --   --   --  19   ALT U/L  --   --   --   --   --   --  29   ALK PHOS U/L  --   --   --   --   --   --  71    < > = values in this interval not displayed  Results from last 7 days   Lab Units 02/25/22  1130 02/25/22  1119   BLOOD CULTURE  No Growth After 5 Days  No Growth After 5 Days               Results from last 7 days   Lab Units 02/25/22  0536   FERRITIN ng/mL 257

## 2022-03-04 ENCOUNTER — TRANSITIONAL CARE MANAGEMENT (OUTPATIENT)
Dept: FAMILY MEDICINE CLINIC | Facility: CLINIC | Age: 27
End: 2022-03-04

## 2022-03-04 DIAGNOSIS — R78.81 POSITIVE BLOOD CULTURES: ICD-10-CM

## 2022-03-04 DIAGNOSIS — R78.81 BACTEREMIA: Primary | ICD-10-CM

## 2022-03-04 NOTE — UTILIZATION REVIEW
Notification of Discharge   This is a Notification of Discharge from our facility [address]  Please be advised that this patient has been discharge from our facility  Below you will find the admission and discharge date and time including the patients disposition  UTILIZATION REVIEW CONTACT:  Maddi Cat  Utilization   Network Utilization Review Department  Phone: [phone] x carefully listen to the prompts  All voicemails are confidential   Email: [email or web address]     PHYSICIAN ADVISORY SERVICES:  FOR [id number] REVIEW - MEDICAL NECESSITY DENIAL  Phone: [phone]  Fax: [phone]  Email: [email or web address]     PRESENTATION DATE: 2/23/2022  8:06 PM  OBERVATION ADMISSION DATE:   INPATIENT ADMISSION DATE: 2/23/22 10:21 PM   DISCHARGE DATE: 3/3/2022  4:51 PM  DISPOSITION: Home/Self Care Home/Self Care      IMPORTANT INFORMATION:  Send all requests for admission clinical reviews, approved or denied determinations and any other requests to dedicated fax number below belonging to the campus where the patient is receiving treatment   List of dedicated fax numbers:  [address] DENIALS (Administrative/Medical Necessity) [phone]   [address] (Maternity/NICU/Pediatrics) [phone]   Mel Hernandez [phone]   [facility] [phone]   Mili Conteh [phone]   [address] [phone]   [facility]  [phone]   [address] [place] [address], [address]  24[address] [facility] 1000 [facility] [phone]

## 2022-03-06 ENCOUNTER — APPOINTMENT (EMERGENCY)
Dept: RADIOLOGY | Facility: HOSPITAL | Age: 27
DRG: 393 | End: 2022-03-06
Payer: COMMERCIAL

## 2022-03-06 ENCOUNTER — HOSPITAL ENCOUNTER (INPATIENT)
Facility: HOSPITAL | Age: 27
LOS: 2 days | Discharge: HOME | DRG: 393 | End: 2022-03-12
Attending: EMERGENCY MEDICINE | Admitting: INTERNAL MEDICINE
Payer: COMMERCIAL

## 2022-03-06 DIAGNOSIS — R78.81 POSITIVE BLOOD CULTURES: ICD-10-CM

## 2022-03-06 DIAGNOSIS — D64.9 ANEMIA, UNSPECIFIED TYPE: ICD-10-CM

## 2022-03-06 DIAGNOSIS — K91.850 ILEAL POUCHITIS (HCC): Primary | ICD-10-CM

## 2022-03-06 DIAGNOSIS — I47.1 SVT (SUPRAVENTRICULAR TACHYCARDIA) (HCC): ICD-10-CM

## 2022-03-06 DIAGNOSIS — K62.4 STRICTURE OF RECTUM: ICD-10-CM

## 2022-03-06 DIAGNOSIS — K52.9 ENTERITIS: ICD-10-CM

## 2022-03-06 DIAGNOSIS — R10.9 INTRACTABLE ABDOMINAL PAIN: ICD-10-CM

## 2022-03-06 DIAGNOSIS — Z86.79 HISTORY OF ENDOCARDITIS: ICD-10-CM

## 2022-03-06 DIAGNOSIS — Z87.19 HISTORY OF ULCERATIVE COLITIS: ICD-10-CM

## 2022-03-06 PROBLEM — E87.6 HYPOKALEMIA: Status: ACTIVE | Noted: 2022-03-06

## 2022-03-06 PROBLEM — R00.0 TACHYCARDIA: Status: ACTIVE | Noted: 2022-03-06

## 2022-03-06 LAB
2HR DELTA HS TROPONIN: 1 NG/L
ALBUMIN SERPL BCP-MCNC: 3.5 G/DL (ref 3.5–5)
ALP SERPL-CCNC: 81 U/L (ref 46–116)
ALT SERPL W P-5'-P-CCNC: 32 U/L (ref 12–78)
ANION GAP SERPL CALCULATED.3IONS-SCNC: 12 MMOL/L (ref 4–13)
APTT PPP: 30 SECONDS (ref 23–37)
AST SERPL W P-5'-P-CCNC: 26 U/L (ref 5–45)
ATRIAL RATE: 116 BPM
ATRIAL RATE: 122 BPM
ATRIAL RATE: 128 BPM
BASOPHILS # BLD AUTO: 0.14 THOUSANDS/ΜL (ref 0–0.1)
BASOPHILS NFR BLD AUTO: 1 % (ref 0–1)
BILIRUB SERPL-MCNC: 0.31 MG/DL (ref 0.2–1)
BILIRUB UR QL STRIP: NEGATIVE
BUN SERPL-MCNC: 7 MG/DL (ref 5–25)
CALCIUM SERPL-MCNC: 8.7 MG/DL (ref 8.3–10.1)
CARDIAC TROPONIN I PNL SERPL HS: 10 NG/L
CARDIAC TROPONIN I PNL SERPL HS: 11 NG/L
CHLORIDE SERPL-SCNC: 105 MMOL/L (ref 100–108)
CLARITY UR: CLEAR
CO2 SERPL-SCNC: 24 MMOL/L (ref 21–32)
COLOR UR: YELLOW
CREAT SERPL-MCNC: 0.79 MG/DL (ref 0.6–1.3)
EOSINOPHIL # BLD AUTO: 0.17 THOUSAND/ΜL (ref 0–0.61)
EOSINOPHIL NFR BLD AUTO: 2 % (ref 0–6)
ERYTHROCYTE [DISTWIDTH] IN BLOOD BY AUTOMATED COUNT: 19.1 % (ref 11.6–15.1)
GFR SERPL CREATININE-BSD FRML MDRD: 122 ML/MIN/1.73SQ M
GLUCOSE SERPL-MCNC: 95 MG/DL (ref 65–140)
GLUCOSE UR STRIP-MCNC: NEGATIVE MG/DL
HCT VFR BLD AUTO: 42.5 % (ref 36.5–49.3)
HGB BLD-MCNC: 12.3 G/DL (ref 12–17)
HGB UR QL STRIP.AUTO: NEGATIVE
IMM GRANULOCYTES # BLD AUTO: 0.04 THOUSAND/UL (ref 0–0.2)
IMM GRANULOCYTES NFR BLD AUTO: 0 % (ref 0–2)
INR PPP: 0.94 (ref 0.84–1.19)
KETONES UR STRIP-MCNC: NEGATIVE MG/DL
LACTATE SERPL-SCNC: 1.9 MMOL/L (ref 0.5–2)
LEUKOCYTE ESTERASE UR QL STRIP: NEGATIVE
LIPASE SERPL-CCNC: 72 U/L (ref 73–393)
LYMPHOCYTES # BLD AUTO: 1.8 THOUSANDS/ΜL (ref 0.6–4.47)
LYMPHOCYTES NFR BLD AUTO: 16 % (ref 14–44)
MAGNESIUM SERPL-MCNC: 2.1 MG/DL (ref 1.6–2.6)
MCH RBC QN AUTO: 18.7 PG (ref 26.8–34.3)
MCHC RBC AUTO-ENTMCNC: 28.9 G/DL (ref 31.4–37.4)
MCV RBC AUTO: 65 FL (ref 82–98)
MONOCYTES # BLD AUTO: 1.14 THOUSAND/ΜL (ref 0.17–1.22)
MONOCYTES NFR BLD AUTO: 10 % (ref 4–12)
NEUTROPHILS # BLD AUTO: 7.77 THOUSANDS/ΜL (ref 1.85–7.62)
NEUTS SEG NFR BLD AUTO: 71 % (ref 43–75)
NITRITE UR QL STRIP: NEGATIVE
NRBC BLD AUTO-RTO: 0 /100 WBCS
P AXIS: 21 DEGREES
P AXIS: 29 DEGREES
P AXIS: 49 DEGREES
PH UR STRIP.AUTO: 6 [PH]
PLATELET # BLD AUTO: 612 THOUSANDS/UL (ref 149–390)
PMV BLD AUTO: 8.7 FL (ref 8.9–12.7)
POTASSIUM SERPL-SCNC: 3.2 MMOL/L (ref 3.5–5.3)
PR INTERVAL: 140 MS
PR INTERVAL: 166 MS
PR INTERVAL: 178 MS
PROCALCITONIN SERPL-MCNC: <0.05 NG/ML
PROT SERPL-MCNC: 7.3 G/DL (ref 6.4–8.2)
PROT UR STRIP-MCNC: NEGATIVE MG/DL
PROTHROMBIN TIME: 12.4 SECONDS (ref 11.6–14.5)
QRS AXIS: -1 DEGREES
QRS AXIS: -2 DEGREES
QRS AXIS: -6 DEGREES
QRSD INTERVAL: 82 MS
QRSD INTERVAL: 86 MS
QRSD INTERVAL: 86 MS
QT INTERVAL: 290 MS
QT INTERVAL: 294 MS
QT INTERVAL: 320 MS
QTC INTERVAL: 413 MS
QTC INTERVAL: 429 MS
QTC INTERVAL: 444 MS
RBC # BLD AUTO: 6.59 MILLION/UL (ref 3.88–5.62)
SODIUM SERPL-SCNC: 141 MMOL/L (ref 136–145)
SP GR UR STRIP.AUTO: 1.02 (ref 1–1.03)
T WAVE AXIS: -4 DEGREES
T WAVE AXIS: 22 DEGREES
T WAVE AXIS: 5 DEGREES
UROBILINOGEN UR QL STRIP.AUTO: 0.2 E.U./DL
VENTRICULAR RATE: 116 BPM
VENTRICULAR RATE: 122 BPM
VENTRICULAR RATE: 128 BPM
WBC # BLD AUTO: 11.06 THOUSAND/UL (ref 4.31–10.16)

## 2022-03-06 PROCEDURE — 84145 PROCALCITONIN (PCT): CPT | Performed by: EMERGENCY MEDICINE

## 2022-03-06 PROCEDURE — 81003 URINALYSIS AUTO W/O SCOPE: CPT | Performed by: EMERGENCY MEDICINE

## 2022-03-06 PROCEDURE — G1004 CDSM NDSC: HCPCS

## 2022-03-06 PROCEDURE — 83735 ASSAY OF MAGNESIUM: CPT | Performed by: STUDENT IN AN ORGANIZED HEALTH CARE EDUCATION/TRAINING PROGRAM

## 2022-03-06 PROCEDURE — 36415 COLL VENOUS BLD VENIPUNCTURE: CPT | Performed by: EMERGENCY MEDICINE

## 2022-03-06 PROCEDURE — 93010 ELECTROCARDIOGRAM REPORT: CPT | Performed by: INTERNAL MEDICINE

## 2022-03-06 PROCEDURE — 85730 THROMBOPLASTIN TIME PARTIAL: CPT | Performed by: EMERGENCY MEDICINE

## 2022-03-06 PROCEDURE — 99292 CRITICAL CARE ADDL 30 MIN: CPT | Performed by: EMERGENCY MEDICINE

## 2022-03-06 PROCEDURE — 71275 CT ANGIOGRAPHY CHEST: CPT

## 2022-03-06 PROCEDURE — 93005 ELECTROCARDIOGRAM TRACING: CPT

## 2022-03-06 PROCEDURE — 96365 THER/PROPH/DIAG IV INF INIT: CPT

## 2022-03-06 PROCEDURE — 99220 PR INITIAL OBSERVATION CARE/DAY 70 MINUTES: CPT | Performed by: STUDENT IN AN ORGANIZED HEALTH CARE EDUCATION/TRAINING PROGRAM

## 2022-03-06 PROCEDURE — 96375 TX/PRO/DX INJ NEW DRUG ADDON: CPT

## 2022-03-06 PROCEDURE — 96361 HYDRATE IV INFUSION ADD-ON: CPT

## 2022-03-06 PROCEDURE — 83605 ASSAY OF LACTIC ACID: CPT | Performed by: EMERGENCY MEDICINE

## 2022-03-06 PROCEDURE — 85610 PROTHROMBIN TIME: CPT | Performed by: EMERGENCY MEDICINE

## 2022-03-06 PROCEDURE — 80053 COMPREHEN METABOLIC PANEL: CPT | Performed by: EMERGENCY MEDICINE

## 2022-03-06 PROCEDURE — 85025 COMPLETE CBC W/AUTO DIFF WBC: CPT | Performed by: EMERGENCY MEDICINE

## 2022-03-06 PROCEDURE — 99291 CRITICAL CARE FIRST HOUR: CPT | Performed by: EMERGENCY MEDICINE

## 2022-03-06 PROCEDURE — 99285 EMERGENCY DEPT VISIT HI MDM: CPT

## 2022-03-06 PROCEDURE — 96367 TX/PROPH/DG ADDL SEQ IV INF: CPT

## 2022-03-06 PROCEDURE — 71045 X-RAY EXAM CHEST 1 VIEW: CPT

## 2022-03-06 PROCEDURE — 87040 BLOOD CULTURE FOR BACTERIA: CPT | Performed by: EMERGENCY MEDICINE

## 2022-03-06 PROCEDURE — 84484 ASSAY OF TROPONIN QUANT: CPT | Performed by: FAMILY MEDICINE

## 2022-03-06 PROCEDURE — 83690 ASSAY OF LIPASE: CPT | Performed by: EMERGENCY MEDICINE

## 2022-03-06 PROCEDURE — 96376 TX/PRO/DX INJ SAME DRUG ADON: CPT

## 2022-03-06 PROCEDURE — 74177 CT ABD & PELVIS W/CONTRAST: CPT

## 2022-03-06 RX ORDER — OXYCODONE HYDROCHLORIDE 5 MG/1
2.5 TABLET ORAL EVERY 6 HOURS PRN
Status: DISCONTINUED | OUTPATIENT
Start: 2022-03-06 | End: 2022-03-08

## 2022-03-06 RX ORDER — POTASSIUM CHLORIDE 29.8 MG/ML
40 INJECTION INTRAVENOUS ONCE
Status: DISCONTINUED | OUTPATIENT
Start: 2022-03-06 | End: 2022-03-06 | Stop reason: CLARIF

## 2022-03-06 RX ORDER — HYDROMORPHONE HCL/PF 1 MG/ML
1 SYRINGE (ML) INJECTION ONCE
Status: COMPLETED | OUTPATIENT
Start: 2022-03-06 | End: 2022-03-06

## 2022-03-06 RX ORDER — METOPROLOL TARTRATE 5 MG/5ML
5 INJECTION INTRAVENOUS ONCE
Status: COMPLETED | OUTPATIENT
Start: 2022-03-06 | End: 2022-03-06

## 2022-03-06 RX ORDER — DILTIAZEM HYDROCHLORIDE 5 MG/ML
20 INJECTION INTRAVENOUS ONCE
Status: DISCONTINUED | OUTPATIENT
Start: 2022-03-06 | End: 2022-03-06

## 2022-03-06 RX ORDER — HYDROCORTISONE 100 MG/60ML
100 SUSPENSION RECTAL
Status: DISCONTINUED | OUTPATIENT
Start: 2022-03-06 | End: 2022-03-09

## 2022-03-06 RX ORDER — LORAZEPAM 2 MG/ML
0.5 INJECTION INTRAMUSCULAR ONCE
Status: COMPLETED | OUTPATIENT
Start: 2022-03-06 | End: 2022-03-06

## 2022-03-06 RX ORDER — ACETAMINOPHEN 325 MG/1
650 TABLET ORAL EVERY 6 HOURS PRN
Status: DISCONTINUED | OUTPATIENT
Start: 2022-03-06 | End: 2022-03-12 | Stop reason: HOSPADM

## 2022-03-06 RX ORDER — ADENOSINE 3 MG/ML
12 INJECTION INTRAVENOUS ONCE
Status: COMPLETED | OUTPATIENT
Start: 2022-03-06 | End: 2022-03-06

## 2022-03-06 RX ORDER — DILTIAZEM HYDROCHLORIDE 5 MG/ML
20 INJECTION INTRAVENOUS ONCE
Status: COMPLETED | OUTPATIENT
Start: 2022-03-06 | End: 2022-03-06

## 2022-03-06 RX ORDER — MAGNESIUM HYDROXIDE/ALUMINUM HYDROXICE/SIMETHICONE 120; 1200; 1200 MG/30ML; MG/30ML; MG/30ML
30 SUSPENSION ORAL EVERY 4 HOURS PRN
Status: DISCONTINUED | OUTPATIENT
Start: 2022-03-06 | End: 2022-03-12 | Stop reason: HOSPADM

## 2022-03-06 RX ORDER — ADENOSINE 3 MG/ML
INJECTION, SOLUTION INTRAVENOUS
Status: DISPENSED
Start: 2022-03-06 | End: 2022-03-07

## 2022-03-06 RX ORDER — CEFAZOLIN SODIUM 2 G/50ML
2000 SOLUTION INTRAVENOUS EVERY 8 HOURS
Status: DISCONTINUED | OUTPATIENT
Start: 2022-03-06 | End: 2022-03-06

## 2022-03-06 RX ORDER — HYDROMORPHONE HCL/PF 1 MG/ML
1 SYRINGE (ML) INJECTION EVERY 4 HOURS PRN
Status: DISCONTINUED | OUTPATIENT
Start: 2022-03-06 | End: 2022-03-07

## 2022-03-06 RX ORDER — LORAZEPAM 2 MG/ML
1 INJECTION INTRAMUSCULAR ONCE
Status: COMPLETED | OUTPATIENT
Start: 2022-03-06 | End: 2022-03-06

## 2022-03-06 RX ORDER — POTASSIUM CHLORIDE 14.9 MG/ML
20 INJECTION INTRAVENOUS ONCE
Status: COMPLETED | OUTPATIENT
Start: 2022-03-06 | End: 2022-03-06

## 2022-03-06 RX ORDER — METOPROLOL TARTRATE 5 MG/5ML
5 INJECTION INTRAVENOUS EVERY 6 HOURS PRN
Status: DISCONTINUED | OUTPATIENT
Start: 2022-03-06 | End: 2022-03-07

## 2022-03-06 RX ORDER — OXYCODONE HYDROCHLORIDE 5 MG/1
2.5 TABLET ORAL EVERY 6 HOURS PRN
Status: DISCONTINUED | OUTPATIENT
Start: 2022-03-06 | End: 2022-03-06

## 2022-03-06 RX ORDER — DULOXETIN HYDROCHLORIDE 30 MG/1
30 CAPSULE, DELAYED RELEASE ORAL DAILY
Status: DISCONTINUED | OUTPATIENT
Start: 2022-03-07 | End: 2022-03-12 | Stop reason: HOSPADM

## 2022-03-06 RX ORDER — DIPHENHYDRAMINE HYDROCHLORIDE 50 MG/ML
25 INJECTION INTRAMUSCULAR; INTRAVENOUS EVERY 6 HOURS PRN
Status: DISCONTINUED | OUTPATIENT
Start: 2022-03-06 | End: 2022-03-12 | Stop reason: HOSPADM

## 2022-03-06 RX ORDER — PROMETHAZINE HYDROCHLORIDE 25 MG/ML
25 INJECTION, SOLUTION INTRAMUSCULAR; INTRAVENOUS ONCE
Status: COMPLETED | OUTPATIENT
Start: 2022-03-06 | End: 2022-03-06

## 2022-03-06 RX ORDER — MAGNESIUM SULFATE HEPTAHYDRATE 40 MG/ML
2 INJECTION, SOLUTION INTRAVENOUS ONCE
Status: COMPLETED | OUTPATIENT
Start: 2022-03-06 | End: 2022-03-06

## 2022-03-06 RX ORDER — ADENOSINE 3 MG/ML
6 INJECTION INTRAVENOUS ONCE
Status: COMPLETED | OUTPATIENT
Start: 2022-03-06 | End: 2022-03-06

## 2022-03-06 RX ORDER — ONDANSETRON 2 MG/ML
4 INJECTION INTRAMUSCULAR; INTRAVENOUS EVERY 6 HOURS PRN
Status: DISCONTINUED | OUTPATIENT
Start: 2022-03-06 | End: 2022-03-08

## 2022-03-06 RX ADMIN — HYDROMORPHONE HYDROCHLORIDE 1 MG: 1 INJECTION, SOLUTION INTRAMUSCULAR; INTRAVENOUS; SUBCUTANEOUS at 21:06

## 2022-03-06 RX ADMIN — IOHEXOL 100 ML: 350 INJECTION, SOLUTION INTRAVENOUS at 18:19

## 2022-03-06 RX ADMIN — PROMETHAZINE HYDROCHLORIDE 25 MG: 25 INJECTION INTRAMUSCULAR; INTRAVENOUS at 23:25

## 2022-03-06 RX ADMIN — DILTIAZEM HYDROCHLORIDE 20 MG: 5 INJECTION INTRAVENOUS at 17:10

## 2022-03-06 RX ADMIN — ADENOSINE 12 MG: 3 INJECTION INTRAVENOUS at 17:05

## 2022-03-06 RX ADMIN — HYDROCORTISONE 100 MG: 100 ENEMA RECTAL at 21:28

## 2022-03-06 RX ADMIN — ADENOSINE 12 MG: 3 INJECTION INTRAVENOUS at 17:01

## 2022-03-06 RX ADMIN — VANCOMYCIN HYDROCHLORIDE 1250 MG: 10 INJECTION, POWDER, LYOPHILIZED, FOR SOLUTION INTRAVENOUS at 18:44

## 2022-03-06 RX ADMIN — POTASSIUM CHLORIDE 20 MEQ: 200 INJECTION, SOLUTION INTRAVENOUS at 18:04

## 2022-03-06 RX ADMIN — MAGNESIUM SULFATE HEPTAHYDRATE 2 G: 40 INJECTION, SOLUTION INTRAVENOUS at 18:10

## 2022-03-06 RX ADMIN — POTASSIUM CHLORIDE 20 MEQ: 200 INJECTION, SOLUTION INTRAVENOUS at 20:04

## 2022-03-06 RX ADMIN — HYDROMORPHONE HYDROCHLORIDE 1 MG: 1 INJECTION, SOLUTION INTRAMUSCULAR; INTRAVENOUS; SUBCUTANEOUS at 17:13

## 2022-03-06 RX ADMIN — METOPROLOL TARTRATE 5 MG: 5 INJECTION INTRAVENOUS at 20:08

## 2022-03-06 RX ADMIN — ONDANSETRON 4 MG: 2 INJECTION INTRAMUSCULAR; INTRAVENOUS at 21:48

## 2022-03-06 RX ADMIN — LORAZEPAM 0.5 MG: 2 INJECTION INTRAMUSCULAR; INTRAVENOUS at 22:16

## 2022-03-06 RX ADMIN — METOPROLOL TARTRATE 5 MG: 5 INJECTION INTRAVENOUS at 18:37

## 2022-03-06 RX ADMIN — HYDROMORPHONE HYDROCHLORIDE 1 MG: 1 INJECTION, SOLUTION INTRAMUSCULAR; INTRAVENOUS; SUBCUTANEOUS at 18:26

## 2022-03-06 RX ADMIN — HYDROMORPHONE HYDROCHLORIDE 1 MG: 1 INJECTION, SOLUTION INTRAMUSCULAR; INTRAVENOUS; SUBCUTANEOUS at 16:07

## 2022-03-06 RX ADMIN — ADENOSINE 6 MG: 3 INJECTION INTRAVENOUS at 16:55

## 2022-03-06 RX ADMIN — ALUMINA, MAGNESIA, AND SIMETHICONE ORAL SUSPENSION REGULAR STRENGTH 30 ML: 1200; 1200; 120 SUSPENSION ORAL at 23:25

## 2022-03-06 RX ADMIN — LORAZEPAM 1 MG: 2 INJECTION INTRAMUSCULAR; INTRAVENOUS at 17:38

## 2022-03-06 RX ADMIN — SODIUM CHLORIDE 1000 ML: 0.9 INJECTION, SOLUTION INTRAVENOUS at 16:01

## 2022-03-06 RX ADMIN — DILTIAZEM HYDROCHLORIDE 5 MG/HR: 5 INJECTION INTRAVENOUS at 17:24

## 2022-03-06 RX ADMIN — HYDROMORPHONE HYDROCHLORIDE 1 MG: 1 INJECTION, SOLUTION INTRAMUSCULAR; INTRAVENOUS; SUBCUTANEOUS at 23:26

## 2022-03-06 NOTE — LETTER
[address] [facility] [address]  Dept: [phone]    March 15, 2022     Patient: Mina Marion   YOB: 1995   Date of Visit: 3/6/2022       To Whom it May Concern:      Mina Marion was hospitalized from 3/6/2022 to 3/12/22  Please excuse from work during this time  Patient may return to work at previous activity level on/or after 3/25/22  Feel free to call with any questions      Sincerely,            Fatoumata Rankin, [address] [facility]  Diplomate, [employer] Board of Internal Medicine

## 2022-03-06 NOTE — ED NOTES
Dr Gordillo Silvia at bedside    Dr Gordillo Silvia says to keep Cardizem at 5mg/hr  Do not titrate at this time       Wen Hinton RN  03/06/22 7135

## 2022-03-06 NOTE — ASSESSMENT & PLAN NOTE
Noted to be in SVT upon arrival to ED   S/p adenosine x3 and cardizem infusion   ICU recommendations appreciated   Monitor on telemetry   Lopressor PRN   Cardiology consulted   ECHO with normal systolic and diastolic function, echodensity on mitral valve

## 2022-03-06 NOTE — ASSESSMENT & PLAN NOTE
S/p pouchoscopy along with TTS balloon dilatation on previous admission   Continue hydrocortisone enemas

## 2022-03-06 NOTE — H&P
Jo[address] 43  H&P- Ricky Velázquez 1995, 27 y o  male MRN: [id number]  Unit/Bed#: ED CT1 Encounter: [id number]  Primary Care Provider: Garry Duane, DO   Date and time admitted to hospital: 3/6/2022  3:29 PM    * Tachycardia  Assessment & Plan  Noted to be in SVT upon arrival to ED   S/p adenosine x3 and cardizem infusion   ICU recommendations appreciated   Monitor on telemetry   Lopressor PRN   Cardiology consulted   ECHO with normal systolic and diastolic function, echodensity on mitral valve            Pouchitis ([address] )  Assessment & Plan  S/p pouchoscopy along with TTS balloon dilatation on previous admission   Continue hydrocortisone enemas     Positive blood cultures  Assessment & Plan  Patient started treatment with cefazolin on 02/28  Vancomycin dose x1    Hypokalemia  Assessment & Plan  Monitor and replete prn    VTE Pharmacologic Prophylaxis:  SCDs  Code Status: Level 1 - Full Code   Discussion with family: Updated  (father) at bedside  Anticipated Length of Stay: Patient will be admitted on an inpatient basis with an anticipated length of stay of greater than 2 midnights secondary to tachycardia  Total Time for Visit, including Counseling / Coordination of Care: 45 minutes Greater than 50% of this total time spent on direct patient counseling and coordination of care  Chief Complaint: Rash     History of Present Illness:  Ricky Velázquez is a 27 y o  male with a PMH of polymicrobial bacteremia and pouchitis s/p recent pouchoscopy with dilation presenting with a rash s/p self administering dose of cefazolin today  Patient found to be in SVT upon arrival to ED and received three doses of adenosine and then started on cardizem infusion  Patient denies any chest pain, palpitations, shortness of breath  Review of Systems:  Review of Systems   Constitutional: Negative for chills and fever  HENT: Negative for rhinorrhea and sore throat      Respiratory: Negative for cough and shortness of breath  Cardiovascular: Negative for chest pain and leg swelling  Gastrointestinal: Negative for abdominal pain, constipation, diarrhea, nausea and vomiting  Genitourinary: Negative for dysuria and hematuria  Neurological: Negative for dizziness, syncope and headaches  Psychiatric/Behavioral: Negative for agitation  The patient is not nervous/anxious  Past Medical and Surgical History:   Past Medical History:   Diagnosis Date    [facility]yl[facility]ondyl[facility] ([facility] )     Anxiety     Bowel obstruction (HCC)     Clostridium difficile colitis 9/13/2018    Colitis     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[facility] ) 9/13/2018    Pancreatitis     Ulcerative colitis ([facility] )        Past Surgical History:   Procedure Laterality Date    COLECTOMY TOTAL      with ileal pouch and anastemosis    IR PICC PLACEMENT SINGLE LUMEN  3/1/2022    TOTAL COLECTOMY         Meds/Allergies:  Prior to Admission medications    Medication Sig Start Date End Date Taking?  Authorizing Provider   ceFAZolin (ANCEF) 2000 mg IVPB Infuse 2,000 mg into a venous catheter every 8 (eight) hours for 20 days 3/3/22 3/23/22  Jitendra Carias MedicoDO   DULoxetine (CYMBALTA) 30 mg delayed release capsule daily 10/26/21   Historical Provider, MD   hydrocortisone (CORTENEMA) 100 mg/60 mL enema Insert 60 mL (100 mg total) into the rectum daily at bedtime for 7 days 3/3/22 3/10/22  Jitendra Solis DO   ondansetron (Zofran ODT) 4 mg disintegrating tablet Take 1 tablet (4 mg total) by mouth every 6 (six) hours as needed for nausea or vomiting 2/21/22   Rahat Adame MD   ondansetron [facility]) 4 mg tablet Take 1 tablet (4 mg total) by mouth every 8 (eight) hours as needed for nausea or vomiting  Patient not taking: Reported on 3/4/2022  1/4/22   Seth Casper DO   oxyCODONE (Roxicodone) 5 immediate release tablet Take 0 5 tablets (2 5 mg total) by mouth every 6 (six) hours as needed for severe pain for up to 5 days Max Daily Amount: 10 mg 3/3/22 3/8/22  Jitendra Solis DO   Tofacitinib Citrate ER (Xeljanz XR) 11 MG TB24 Take by mouth daily    Historical Provider, MD     I have reviewed home medications using recent Epic encounter  Allergies: Allergies   Allergen Reactions    Mesalamine GI Intolerance     Other reaction(s): Pancreatitis  Annotation - [id number]: pancreatitis         Social History     Substance and Sexual Activity   Alcohol Use Yes    Comment: pt states 5 a month/socially     Social History     Tobacco Use   Smoking Status Never Smoker   Smokeless Tobacco Never Used     Social History     Substance and Sexual Activity   Drug Use No       Family History:  History reviewed  No pertinent family history  Physical Exam:     Vitals:   Blood Pressure: (!) 148/116 (03/06/22 1900)  Pulse: (!) 120 (03/06/22 1900)  Temperature: 99 7 °F (37 6 °C) (03/06/22 1719)  Temp Source: Tympanic (03/06/22 1719)  Respirations: 18 (03/06/22 1900)  Height: 5' 9" (175 3 cm) (03/06/22 1525)  Weight - Scale: 86 4 kg (190 lb 7 6 oz) (03/06/22 1525)  SpO2: 92 % (03/06/22 1900)    Physical Exam  HENT:      Head: Normocephalic and atraumatic  Mouth/Throat:      Mouth: Mucous membranes are moist    Eyes:      Extraocular Movements: Extraocular movements intact  Cardiovascular:      Rate and Rhythm: Normal rate  Pulmonary:      Effort: Pulmonary effort is normal    Abdominal:      General: Abdomen is flat  Palpations: Abdomen is soft  Tenderness: There is no abdominal tenderness  Skin:     General: Skin is warm and dry  Findings: No rash  Neurological:      Mental Status: He is alert            Additional Data:     Lab Results:  Results from last 7 days   Lab Units 03/06/22  1555   WBC Thousand/uL 11 06*   HEMOGLOBIN g/dL 12 3   HEMATOCRIT % 42 5   PLATELETS Thousands/uL 612*   NEUTROS PCT % 71   LYMPHS PCT % 16   MONOS PCT % 10   EOS PCT % 2     Results from last 7 days   Lab Units 03/06/22  1555   SODIUM mmol/L 141   POTASSIUM mmol/L 3 2*   CHLORIDE mmol/L 105   CO2 mmol/L 24   BUN mg/dL 7   CREATININE mg/dL 0 79   ANION GAP mmol/L 12   CALCIUM mg/dL 8 7   ALBUMIN g/dL 3 5   TOTAL BILIRUBIN mg/dL 0 31   ALK PHOS U/L 81   ALT U/L 32   AST U/L 26   GLUCOSE RANDOM mg/dL 95     Results from last 7 days   Lab Units 03/06/22  1555   INR  0 94             Results from last 7 days   Lab Units 03/06/22  1555   LACTIC ACID mmol/L 1 9   PROCALCITONIN ng/ml <0 05       Imaging: No pertinent imaging reviewed  XR chest 1 view portable    (Results Pending)   CT abdomen pelvis with contrast    (Results Pending)         ** Please Note: This note has been constructed using a voice recognition system   **

## 2022-03-07 LAB
4HR DELTA HS TROPONIN: -1 NG/L
ANION GAP SERPL CALCULATED.3IONS-SCNC: 8 MMOL/L (ref 4–13)
BASOPHILS # BLD AUTO: 0.13 THOUSANDS/ΜL (ref 0–0.1)
BASOPHILS NFR BLD AUTO: 1 % (ref 0–1)
BUN SERPL-MCNC: 6 MG/DL (ref 5–25)
CALCIUM SERPL-MCNC: 8.5 MG/DL (ref 8.3–10.1)
CARDIAC TROPONIN I PNL SERPL HS: 9 NG/L
CHLORIDE SERPL-SCNC: 103 MMOL/L (ref 100–108)
CO2 SERPL-SCNC: 27 MMOL/L (ref 21–32)
CREAT SERPL-MCNC: 1.05 MG/DL (ref 0.6–1.3)
EOSINOPHIL # BLD AUTO: 0.03 THOUSAND/ΜL (ref 0–0.61)
EOSINOPHIL NFR BLD AUTO: 0 % (ref 0–6)
ERYTHROCYTE [DISTWIDTH] IN BLOOD BY AUTOMATED COUNT: 18.4 % (ref 11.6–15.1)
GFR SERPL CREATININE-BSD FRML MDRD: 96 ML/MIN/1.73SQ M
GLUCOSE P FAST SERPL-MCNC: 96 MG/DL (ref 65–99)
GLUCOSE SERPL-MCNC: 96 MG/DL (ref 65–140)
HCT VFR BLD AUTO: 37 % (ref 36.5–49.3)
HGB BLD-MCNC: 10.9 G/DL (ref 12–17)
IMM GRANULOCYTES # BLD AUTO: 0.07 THOUSAND/UL (ref 0–0.2)
IMM GRANULOCYTES NFR BLD AUTO: 0 % (ref 0–2)
LYMPHOCYTES # BLD AUTO: 2.31 THOUSANDS/ΜL (ref 0.6–4.47)
LYMPHOCYTES NFR BLD AUTO: 15 % (ref 14–44)
MCH RBC QN AUTO: 19.1 PG (ref 26.8–34.3)
MCHC RBC AUTO-ENTMCNC: 29.5 G/DL (ref 31.4–37.4)
MCV RBC AUTO: 65 FL (ref 82–98)
MONOCYTES # BLD AUTO: 0.92 THOUSAND/ΜL (ref 0.17–1.22)
MONOCYTES NFR BLD AUTO: 6 % (ref 4–12)
NEUTROPHILS # BLD AUTO: 12.52 THOUSANDS/ΜL (ref 1.85–7.62)
NEUTS SEG NFR BLD AUTO: 78 % (ref 43–75)
NRBC BLD AUTO-RTO: 0 /100 WBCS
PLATELET # BLD AUTO: 509 THOUSANDS/UL (ref 149–390)
PMV BLD AUTO: 8.1 FL (ref 8.9–12.7)
POTASSIUM SERPL-SCNC: 4.2 MMOL/L (ref 3.5–5.3)
PROCALCITONIN SERPL-MCNC: <0.05 NG/ML
RBC # BLD AUTO: 5.72 MILLION/UL (ref 3.88–5.62)
SODIUM SERPL-SCNC: 138 MMOL/L (ref 136–145)
WBC # BLD AUTO: 15.98 THOUSAND/UL (ref 4.31–10.16)

## 2022-03-07 PROCEDURE — 93005 ELECTROCARDIOGRAM TRACING: CPT

## 2022-03-07 PROCEDURE — 99225 PR SBSQ OBSERVATION CARE/DAY 25 MINUTES: CPT | Performed by: STUDENT IN AN ORGANIZED HEALTH CARE EDUCATION/TRAINING PROGRAM

## 2022-03-07 PROCEDURE — 99204 OFFICE O/P NEW MOD 45 MIN: CPT | Performed by: INTERNAL MEDICINE

## 2022-03-07 PROCEDURE — 85025 COMPLETE CBC W/AUTO DIFF WBC: CPT | Performed by: STUDENT IN AN ORGANIZED HEALTH CARE EDUCATION/TRAINING PROGRAM

## 2022-03-07 PROCEDURE — 80048 BASIC METABOLIC PNL TOTAL CA: CPT | Performed by: STUDENT IN AN ORGANIZED HEALTH CARE EDUCATION/TRAINING PROGRAM

## 2022-03-07 PROCEDURE — 84145 PROCALCITONIN (PCT): CPT | Performed by: EMERGENCY MEDICINE

## 2022-03-07 PROCEDURE — 99215 OFFICE O/P EST HI 40 MIN: CPT | Performed by: INTERNAL MEDICINE

## 2022-03-07 RX ORDER — HYDROMORPHONE HCL 110MG/55ML
2 PATIENT CONTROLLED ANALGESIA SYRINGE INTRAVENOUS ONCE
Status: COMPLETED | OUTPATIENT
Start: 2022-03-07 | End: 2022-03-07

## 2022-03-07 RX ORDER — ALPRAZOLAM 0.25 MG/1
0.25 TABLET ORAL 2 TIMES DAILY PRN
Status: DISCONTINUED | OUTPATIENT
Start: 2022-03-07 | End: 2022-03-10

## 2022-03-07 RX ORDER — SODIUM CHLORIDE 9 MG/ML
75 INJECTION, SOLUTION INTRAVENOUS CONTINUOUS
Status: DISCONTINUED | OUTPATIENT
Start: 2022-03-07 | End: 2022-03-11

## 2022-03-07 RX ORDER — LORAZEPAM 2 MG/ML
1 INJECTION INTRAMUSCULAR ONCE
Status: COMPLETED | OUTPATIENT
Start: 2022-03-07 | End: 2022-03-07

## 2022-03-07 RX ORDER — HYDROMORPHONE HCL/PF 1 MG/ML
1 SYRINGE (ML) INJECTION
Status: DISCONTINUED | OUTPATIENT
Start: 2022-03-07 | End: 2022-03-11

## 2022-03-07 RX ORDER — DILTIAZEM HYDROCHLORIDE 5 MG/ML
15 INJECTION INTRAVENOUS ONCE
Status: COMPLETED | OUTPATIENT
Start: 2022-03-07 | End: 2022-03-07

## 2022-03-07 RX ORDER — METHYLPREDNISOLONE SODIUM SUCCINATE 125 MG/2ML
125 INJECTION, POWDER, LYOPHILIZED, FOR SOLUTION INTRAMUSCULAR; INTRAVENOUS DAILY
Status: DISCONTINUED | OUTPATIENT
Start: 2022-03-07 | End: 2022-03-07

## 2022-03-07 RX ORDER — METOPROLOL TARTRATE 5 MG/5ML
5 INJECTION INTRAVENOUS EVERY 6 HOURS PRN
Status: DISCONTINUED | OUTPATIENT
Start: 2022-03-07 | End: 2022-03-12 | Stop reason: HOSPADM

## 2022-03-07 RX ADMIN — DIPHENHYDRAMINE HYDROCHLORIDE 25 MG: 50 INJECTION, SOLUTION INTRAMUSCULAR; INTRAVENOUS at 17:19

## 2022-03-07 RX ADMIN — FAMOTIDINE 20 MG: 10 INJECTION, SOLUTION INTRAVENOUS at 17:43

## 2022-03-07 RX ADMIN — VANCOMYCIN HYDROCHLORIDE 1250 MG: 1 INJECTION, POWDER, LYOPHILIZED, FOR SOLUTION INTRAVENOUS at 16:46

## 2022-03-07 RX ADMIN — SODIUM CHLORIDE 100 ML/HR: 9 INJECTION, SOLUTION INTRAVENOUS at 23:15

## 2022-03-07 RX ADMIN — ALPRAZOLAM 0.25 MG: 0.25 TABLET ORAL at 21:18

## 2022-03-07 RX ADMIN — HYDROCORTISONE 100 MG: 100 ENEMA RECTAL at 21:18

## 2022-03-07 RX ADMIN — HYDROMORPHONE HYDROCHLORIDE 1 MG: 1 INJECTION, SOLUTION INTRAMUSCULAR; INTRAVENOUS; SUBCUTANEOUS at 11:14

## 2022-03-07 RX ADMIN — ONDANSETRON 4 MG: 2 INJECTION INTRAMUSCULAR; INTRAVENOUS at 05:08

## 2022-03-07 RX ADMIN — ALPRAZOLAM 0.25 MG: 0.25 TABLET ORAL at 09:40

## 2022-03-07 RX ADMIN — METOPROLOL TARTRATE 5 MG: 5 INJECTION INTRAVENOUS at 17:19

## 2022-03-07 RX ADMIN — HYDROMORPHONE HYDROCHLORIDE 1 MG: 1 INJECTION, SOLUTION INTRAMUSCULAR; INTRAVENOUS; SUBCUTANEOUS at 18:14

## 2022-03-07 RX ADMIN — METHYLPREDNISOLONE SODIUM SUCCINATE 125 MG: 125 INJECTION, POWDER, FOR SOLUTION INTRAMUSCULAR; INTRAVENOUS at 09:40

## 2022-03-07 RX ADMIN — LORAZEPAM 1 MG: 2 INJECTION INTRAMUSCULAR; INTRAVENOUS at 19:56

## 2022-03-07 RX ADMIN — HYDROMORPHONE HYDROCHLORIDE 1 MG: 1 INJECTION, SOLUTION INTRAMUSCULAR; INTRAVENOUS; SUBCUTANEOUS at 21:25

## 2022-03-07 RX ADMIN — HYDROMORPHONE HYDROCHLORIDE 1 MG: 1 INJECTION, SOLUTION INTRAMUSCULAR; INTRAVENOUS; SUBCUTANEOUS at 07:48

## 2022-03-07 RX ADMIN — DILTIAZEM HYDROCHLORIDE 15 MG: 5 INJECTION INTRAVENOUS at 18:05

## 2022-03-07 RX ADMIN — HYDROMORPHONE HYDROCHLORIDE 1 MG: 1 INJECTION, SOLUTION INTRAMUSCULAR; INTRAVENOUS; SUBCUTANEOUS at 02:42

## 2022-03-07 RX ADMIN — DULOXETINE HYDROCHLORIDE 30 MG: 30 CAPSULE, DELAYED RELEASE ORAL at 08:49

## 2022-03-07 RX ADMIN — HYDROMORPHONE HYDROCHLORIDE 2 MG: 2 INJECTION, SOLUTION INTRAMUSCULAR; INTRAVENOUS; SUBCUTANEOUS at 05:46

## 2022-03-07 RX ADMIN — DIPHENHYDRAMINE HYDROCHLORIDE 25 MG: 50 INJECTION, SOLUTION INTRAMUSCULAR; INTRAVENOUS at 09:41

## 2022-03-07 RX ADMIN — HYDROMORPHONE HYDROCHLORIDE 1 MG: 1 INJECTION, SOLUTION INTRAMUSCULAR; INTRAVENOUS; SUBCUTANEOUS at 14:59

## 2022-03-07 NOTE — QUICK NOTE
Progress Note - Triage Assessment   Rodrick Miguel 27 y o  male MRN: [id number]    Time Called: 3781  Date Called: 03/06/22  Room#: ED CT1  Time Evaluated: 1815  Person requesting evaluation: Dr Miri Kern to ED to evaluate patient for possible admission to Critical Care Service, chart reviewed and patient evaluated  Case discussed with Critical Care attending Dr Bry Warner and after review it was felt the patient is appropriate for admission to a general medical floor  Discussed case with [facility] provider Dr Hilario Murphy and they have agreed to accept patient to their service  Recommendations discussed with ED attending Dr Sissy Goldsmith  PT no longer in SVT, now in Sinus tachycardia with no irregularity  No further need for cardizem drip or bolusing  Supportive care for underlying process per [facility] team           Triage Assessment:     Patient appropriate to be admitted to med-surg level of care  If any questions or concerns please call the critical care team at ext [phone]

## 2022-03-07 NOTE — ASSESSMENT & PLAN NOTE
Noted to be in SVT upon arrival to ED   S/p adenosine x3 and cardizem infusion   ICU/cardiology recommendations appreciated   Monitor on telemetry   ECHO with normal systolic and diastolic function, echodensity on mitral valve

## 2022-03-07 NOTE — ASSESSMENT & PLAN NOTE
S/p pouchoscopy along with TTS balloon dilatation on previous admission   Continue hydrocortisone enemas and pain regimen

## 2022-03-07 NOTE — UTILIZATION REVIEW
Initial Clinical Review    Admission: Date/Time/Statement:   Admission Orders (From admission, onward)     Ordered        03/06/22 1839  Place in Observation  Once                      Orders Placed This Encounter   Procedures    Place in Observation     Standing Status:   Standing     Number of Occurrences:   1     Order Specific Question:   Level of Care     Answer:   Med Surg [16]     ED Arrival Information     Expected Arrival Acuity    - 3/6/2022 15:24 Emergent         Means of arrival Escorted by Service Admission type    [facility] Emergency         Arrival complaint    Chest pain        Chief Complaint   Patient presents with    Allergic Reaction     Pt experiencing hives to neck and abdominal pain after administering cefazolin  Pt does not c/o SOB  Initial Presentation:   27 y o  male with a PMH of polymicrobial bacteremia and pouchitis s/p recent pouchoscopy with dilation presenting with a rash s/p self administering dose of cefazolin today  Patient found to be in SVT upon arrival to ED and received three doses of adenosine and then started on cardizem infusion  Patient denies any chest pain, palpitations, shortness of breath     Tachycardia  Assessment & Plan  Noted to be in SVT upon arrival to ED   S/p adenosine x3 and cardizem infusion   ICU recommendations appreciated   Monitor on telemetry   Lopressor PRN   Cardiology consulted   ECHO with normal systolic and diastolic function, echodensity on mitral valve    Pouchitis (HCC)  Assessment & Plan  S/p pouchoscopy along with TTS balloon dilatation on previous admission   Continue hydrocortisone enemas      Positive blood cultures  Assessment & Plan  Patient started treatment with cefazolin on 02/28  Vancomycin dose x1     Hypokalemia  Assessment & Plan  Monitor and replete prn      ED Triage Vitals [03/06/22 1525]   Temperature Pulse Respirations Blood Pressure SpO2   98 2 °F (36 8 °C) (!) 128 18 143/91 97 %      Temp Source Heart Rate Source Patient Position - Orthostatic VS BP Location FiO2 (%)   Tympanic Monitor Lying Left arm --      Pain Score       8          Wt Readings from Last 1 Encounters:   03/06/22 86 4 kg (190 lb 7 6 oz)     Additional Vital Signs:   03/07/22 03:05:41 97 6 °F (36 4 °C) 95 14 131/87 102 94 % -- --   03/07/22 02:34:06 -- 65 -- 118/73 88 97 % -- Lying   03/06/22 23:51:04 99 °F (37 2 °C) 101 20 140/101 Abnormal  114 96 % -- --   03/06/22 22:18:04 -- 105 -- 118/85 96 96 % -- --   03/06/22 2106 -- -- -- -- -- -- None (Room air) --   03/06/22 20:50:54 98 2 °F (36 8 °C) 91 -- 126/79 95 97 % -- --   03/06/22 2000 -- 133 Abnormal  18 109/78 90 94 % None (Room air) Lying     Pertinent Labs/Diagnostic Test Results:   CT pe study w abdomen pelvis w contrast   Final Result by Lamar Campa MD (03/06 2139)      Right lower lobe centrilobular nodular changes concerning for an infectious or inflammatory process (2:)  Patient is post colectomy  There is mural thickening of the rectum and distal small bowel concerning for enteritis/proctitis/pouchitis         There is a 1 3 cm lymph node in the right iliac chain (3:64)    This is similar to the prior exam          XR chest 1 view portable    (Results Pending)     Results from last 7 days   Lab Units 03/01/22  0916   SARS-COV-2  Negative     Results from last 7 days   Lab Units 03/07/22  0512 03/06/22  1555 03/01/22  0546   WBC Thousand/uL 15 98* 11 06* 7 81   HEMOGLOBIN g/dL 10 9* 12 3 10 4*   HEMATOCRIT % 37 0 42 5 34 9*   PLATELETS Thousands/uL 509* 612* 393*   NEUTROS ABS Thousands/µL 12 52* 7 77* 4 55         Results from last 7 days   Lab Units 03/07/22  0512 03/06/22  1555 03/01/22  0546   SODIUM mmol/L 138 141 139   POTASSIUM mmol/L 4 2 3 2* 3 7   CHLORIDE mmol/L 103 105 104   CO2 mmol/L 27 24 29   ANION GAP mmol/L 8 12 6   BUN mg/dL 6 7 4*   CREATININE mg/dL 1 05 0 79 0 98   EGFR ml/min/1 73sq m 96 122 104   CALCIUM mg/dL 8 5 8 7 8 1*   MAGNESIUM mg/dL  -- 2 1  --      Results from last 7 days   Lab Units 03/06/22  1555   AST U/L 26   ALT U/L 32   ALK PHOS U/L 81   TOTAL PROTEIN g/dL 7 3   ALBUMIN g/dL 3 5   TOTAL BILIRUBIN mg/dL 0 31         Results from last 7 days   Lab Units 03/07/22  0512 03/06/22  1555 03/01/22  0546   GLUCOSE RANDOM mg/dL 96 95 88             No results found for: BETA-HYDROXYBUTYRATE                   Results from last 7 days   Lab Units 03/06/22  2330 03/06/22  2115 03/06/22 2020   HS TNI 0HR ng/L  --   --  10   HS TNI 2HR ng/L  --  11  --    HSTNI D2 ng/L  --  1  --    HS TNI 4HR ng/L 9  --   --    HSTNI D4 ng/L -1  --   --          Results from last 7 days   Lab Units 03/06/22  1555   PROTIME seconds 12 4   INR  0 94   PTT seconds 30         Results from last 7 days   Lab Units 03/07/22  0513 03/06/22  1555   PROCALCITONIN ng/ml <0 05 <0 05     Results from last 7 days   Lab Units 03/06/22  1555   LACTIC ACID mmol/L 1 9                         Results from last 7 days   Lab Units 03/06/22  1555   LIPASE u/L 72*                 Results from last 7 days   Lab Units 03/06/22  2017   CLARITY UA  Clear   COLOR UA  Yellow   SPEC GRAV UA  1 020   PH UA  6 0   GLUCOSE UA mg/dl Negative   KETONES UA mg/dl Negative   BLOOD UA  Negative   PROTEIN UA mg/dl Negative   NITRITE UA  Negative   BILIRUBIN UA  Negative   UROBILINOGEN UA E U /dl 0 2   LEUKOCYTES UA  Negative     Results from last 7 days   Lab Units 03/01/22  0916   INFLUENZA A PCR  Negative   INFLUENZA B PCR  Negative   RSV PCR  Negative                             Results from last 7 days   Lab Units 03/06/22  1555   BLOOD CULTURE  Received in Microbiology Lab  Culture in Progress  Received in Microbiology Lab  Culture in Progress                 ED Treatment:   Medication Administration from 03/06/2022 1524 to 03/06/2022 2046       Date/Time Order Dose Route Action     03/06/2022 1601 sodium chloride 0 9 % bolus 1,000 mL 1,000 mL Intravenous New Bag     03/06/2022 1607 HYDROmorphone (DILAUDID) injection 1 mg 1 mg Intravenous Given     03/06/2022 1713 HYDROmorphone (DILAUDID) injection 1 mg 1 mg Intravenous Given     03/06/2022 1655 adenosine (ADENOCARD) injection 6 mg 6 mg Intravenous Given     03/06/2022 1701 adenosine (ADENOCARD) injection 12 mg 12 mg Intravenous Given     03/06/2022 1710 diltiazem (CARDIZEM) injection 20 mg 20 mg Intravenous Given     03/06/2022 1724 diltiazem (CARDIZEM) 125 mg in sodium chloride 0 9 % 125 mL infusion 5 mg/hr Intravenous New Bag     03/06/2022 1705 adenosine (ADENOCARD) injection 12 mg 12 mg Intravenous Given     03/06/2022 2013 magnesium sulfate 2 g/50 mL IVPB (premix) 2 g 0 g Intravenous Stopped     03/06/2022 1810 magnesium sulfate 2 g/50 mL IVPB (premix) 2 g 2 g Intravenous New Bag     03/06/2022 1804 potassium chloride 20 mEq IVPB (premix) 20 mEq Intravenous New Bag     03/06/2022 2004 potassium chloride 20 mEq IVPB (premix) 20 mEq Intravenous New Bag     03/06/2022 1738 LORazepam (ATIVAN) injection 1 mg 1 mg Intravenous Given     03/06/2022 1826 HYDROmorphone (DILAUDID) injection 1 mg 1 mg Intravenous Given     03/06/2022 1844 vancomycin (VANCOCIN) 1,250 mg in sodium chloride 0 9 % 250 mL IVPB 1,250 mg Intravenous New Bag     03/06/2022 1819 iohexol (OMNIPAQUE) 350 MG/ML injection (SINGLE-DOSE) 100 mL 100 mL Intravenous Given     03/06/2022 1837 metoprolol (LOPRESSOR) injection 5 mg 5 mg Intravenous Given     03/06/2022 2008 metoprolol (LOPRESSOR) injection 5 mg 5 mg Intravenous Given        Past Medical History:   Diagnosis Date    [facility]     Anxiety     Bowel obstruction ([facility])     Clostridium difficile colitis 9/13/2018    Colitis     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 ) 9/13/2018    Pancreatitis     [facility]      Present on Admission:   Tachycardia   Enteritis   Positive blood cultures   Pouchitis ([facility])      Admitting Diagnosis: SVT (supraventricular tachycardia) ([facility]) [I47 1]  Allergic reaction [T78 40XA]  History of endocarditis [Z86 79]  Intractable abdominal pain [R10 9]  Age/Sex: 27 y o  male  Admission Orders:  Scd/foot pumps  Telemetry  Consult cardio    Scheduled Medications:  DULoxetine, 30 mg, Oral, Daily  hydrocortisone, 100 mg, Rectal, HS      Continuous IV Infusions:     PRN Meds:  acetaminophen, 650 mg, Oral, Q6H PRN  aluminum-magnesium hydroxide-simethicone, 30 mL, Oral, Q4H PRN  diphenhydrAMINE, 25 mg, Intravenous, Q6H PRN  HYDROmorphone, 1 mg, Intravenous, Q4H PRN  metoprolol, 5 mg, Intravenous, Q6H PRN  ondansetron, 4 mg, Intravenous, Q6H PRN  oxyCODONE, 2 5 mg, Oral, Q6H PRN        IP CONSULT TO MEDICAL CRITICAL CARE  IP CONSULT TO CARDIOLOGY    Network Utilization Review Department  ATTENTION: Please call with any questions or concerns to [phone] and carefully listen to the prompts so that you are directed to the right person  All voicemails are confidential   Cesario Lucero all requests for admission clinical reviews, approved or denied determinations and any other requests to dedicated fax number below belonging to the campus where the patient is receiving treatment   List of dedicated fax numbers for the Facilities:  [address] DENIALS (Administrative/Medical Necessity) [phone]   [address] (Maternity/NICU/Pediatrics) [phone]   [address]  [address] [facility] [place] [address] [address] [place] [address] [address] [facility] [facility] [phone]

## 2022-03-07 NOTE — CASE MANAGEMENT
Case Management Assessment & Discharge Planning Note    Patient name Thuy Snyder  Location Crist[facility]-* MRN [id number]  : 1995 Date 3/7/2022       Current Admission Date: 3/6/2022  Current Admission Diagnosis:Tachycardia   Patient Active Problem List    Diagnosis Date Noted    Tachycardia 2022    Pouchitis ([address] ) 2022    Hypokalemia 2022    Anemia 2022    Positive blood cultures 2022    Enteritis 2022    SIRS (systemic inflammatory response syndrome) ([facility] ) 2022    Sepsis ([address] ) 2022    Ankylosing spondylitis ([address] ) 2022    Elevated LFTs 2022    Arm and leg movements, uncontrollable 2021    Seizure-like activity ([address] ) 2021    Arthralgia 09/15/2020    Sacroiliac joint dysfunction of right side 2020    Left groin pain 2019    Left-sided low back pain without sciatica     Complications of intestinal pouch ([address] ) 2019    SARAHI (acute kidney injury) ([address] ) 2019    History of ulcerative colitis 2019    Hyponatremia 2019    CAR (generalized anxiety disorder) 10/19/2018    BMI 28 0-28 9,adult 10/19/2018    Chronic ulcerative pancolitis ([facility] ) 2018    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[facility] ) 2018    Stricture of rectum 2018      LOS (days): 0  Geometric Mean LOS (GMLOS) (days):   Days to GMLOS:     OBJECTIVE:   Current admission status: Observation    Preferred Pharmacy:   [address] [facility] [address]  [address] [facility] [address]  Phone: [phone] Fax: [phone]    Primary Care Provider: Taylor Lion DO    Primary Insurance: BLUE CROSS  Secondary Insurance:     ASSESSMENT[facility]  [id number] [facility]   Primary Phone: [phone] [facility])  Home Phone: [phone]          Advance Directives  Does patient have a [address] [facility] [address]?: No  Was patient offered paperwork?: Yes  Does patient currently have a Health Care decision maker?: Yes, please see Health Care Proxy section  Does patient have Advance Directives?: No  Advance Directives:  (Patient declines infrmation on LW and POA )  Was patient offered paperwork?: Yes    Obs Notice Signed: 03/06/22    Readmission Root Cause  30 Day Readmission: Yes  Who directed you to return to the hospital?: Self  Did you understand whom to contact if you had questions or problems?: Yes  Did you get your prescriptions before you left the hospital?: No  Reason[de-identified] Not preferred pharmacy  Were you able to get your prescriptions filled when you left the hospital?: Yes  Did you take your medications as prescribed?: Yes  Were you able to get to your follow-up appointments?: No  Reason[de-identified] Readmitted prior to appointment  During previous admission, was a post-acute recommendation made?: No  Patient was readmitted due to: nausea and vomitting, hives due to allergic rxn from abx? Action Plan: Patient education and MD evaluation concerning medications    Patient Information  Admitted from[de-identified] Home  Mental Status: Alert  During Assessment patient was accompanied by: Not accompanied during assessment  Assessment information provided by[de-identified] Patient  Primary Caregiver: Self  Support Systems: Parent,Spouse/significant other  [place] of Residence: [address] [place] [address] do you live in?: [place] entry access options   Select all that apply : Stairs  Number of steps to enter home : 2  Do the steps have railings?: Yes  Type of Current Residence: [address] [place] home  Upon entering residence, is there a bedroom on the main floor (no further steps)?: No  A bedroom is located on the following floor levels of residence (select all that apply):: 2nd Floor  Upon entering residence, is there a bathroom on the main floor (no further steps)?: Yes  Number of steps to 2nd floor from main floor: One Flight  In the last 12 months, was there a time when you were not able to pay the mortgage or rent on time?: No  In the last 12 months, how many places have you lived?: 1  In the last 12 months, was there a time when you did not have a steady place to sleep or slept in a shelter (including now)?: No  Homeless/housing insecurity resource given?: N/A  Living Arrangements: Lives w/ Parent(s)  Is patient a ?: No    Activities of Daily Living Prior to Admission  Functional Status: Independent  Completes ADLs independently?: Yes  Ambulates independently?: Yes  Does patient use assisted devices?: No  Does patient currently own DME?: No  Does patient have a history of Outpatient Therapy (PT/OT)?: No  Does the patient have a history of Short-Term Rehab?: No  Does patient have a history of HHC?: Yes ([facility] for IV Abx/PICC care-present)  Does patient currently have [address]?: Yes ([facility] nursing for IV Infusion/PICC care)    Current Home Health Care  Type of Current Home Care Services: Nurse visit  [facility] [address][de-identified] Other (please enter name in comment) ([facility])  Current [address] [facility] Provider[de-identified] PCP    Patient Information Continued  Income Source: Employed  Does patient have prescription coverage?: Yes  Within the past 12 months, you worried that your food would run out before you got the money to buy more : Never true  Within the past 12 months, the food you bought just didnt last and you didnt have money to get more : Never true  Food insecurity resource given?: N/A  Does patient receive dialysis treatments?: No  Does patient have a history of substance abuse?: No  Does patient have a history of Mental Health Diagnosis?: Yes  Is patient receiving treatment for mental health?: Yes (Patient is on medication prescribed by his Rheumatologist)  Has patient received inpatient treatment related to mental health in the last 2 years?: No    Means of Transportation  Means of Transport to Appts[de-identified] Drives Self  In the past 12 months, has lack of transportation kept you from medical appointments or from getting medications?: No  In the past 12 months, has lack of transportation kept you from meetings, work, or from getting things needed for daily living?: No  Was application for public transport provided?: N/A    DISCHARGE DETAILS:  Discharge planning discussed with[de-identified] Patient  Freedom of Choice: Yes  Comments - Freedom of Choice: Patient is currently receiving services with [facility] for home IV Infusion and PICC care  He was provided with Infusion co choice and he would like a referral back to [place] [address] and resume with them  CM contacted family/caregiver?: No- see comments (Patient does not want CM to speak with family at this time )  Were Treatment Team discharge recommendations reviewed with patient/caregiver?: Yes  Did patient/caregiver verbalize understanding of patient care needs?: Yes  Were patient/caregiver advised of the risks associated with not following Treatment Team discharge recommendations?: Yes    Contacts  Patient Contacts: Tracey Walsh  Relationship to Patient[de-identified] Family (father)  Contact Method: Phone  Phone Number: [phone]  Reason/Outcome: Emergency [address]         Is the patient interested in [facility] at discharge?: No    DME Referral Provided  Referral made for DME?: No    Other Referral/Resources/Interventions Provided:  Interventions: Home Infusion    Would you like to participate in our [address] service program?  : No - Declined    Treatment Team Recommendation: Other (Home infusion)  Discharge Destination Plan[de-identified] Home  Transport at Discharge : Family     CM met with patient and discussed dc planning and the role of CM  Patient states he is independent of all ADL's and ambulates without assistance  He works FT and drives  He states he was recently discharged from the hospital on IV Abx for Endocarditis with [facility]  He was provided with [facility] choice and he requests a referral be made back to [place] [address]   A referral was sent in [place]  CM spoke with Carlos Parson at [employer] and confirmed they have patient under their services and will resume at [place]

## 2022-03-07 NOTE — PROGRESS NOTES
Vancomycin Assessment    Maribell Vargas is a 27 y o  male who is currently receiving vancomycin 1250mg IV q12h for bacteremia     Relevant clinical data and objective history reviewed:  Creatinine   Date Value Ref Range Status   03/07/2022 1 05 0 60 - 1 30 mg/dL Final     Comment:     Standardized to IDMS reference method   03/06/2022 0 79 0 60 - 1 30 mg/dL Final     Comment:     Standardized to IDMS reference method   03/01/2022 0 98 0 60 - 1 30 mg/dL Final     Comment:     Standardized to IDMS reference method   10/31/2015 0 9 0 6 - 1 3 mg/dL    10/30/2015 1 0 0 6 - 1 3 mg/dL      /92   Pulse (!) 108   Temp 97 8 °F (36 6 °C)   Resp 18   Ht 5' 9" (1 753 m)   Wt 86 4 kg (190 lb 7 6 oz)   SpO2 96%   BMI 28 13 kg/m²   I/O last 3 completed shifts: In: 3646 [I V :5; IV Piggyback:1400]  Out: 675 [Urine:675]  Lab Results   Component Value Date/Time    BUN 6 03/07/2022 05:12 AM    BUN 7 10/26/2018 07:34 AM    WBC 15 98 (H) 03/07/2022 05:12 AM    WBC 9 0 10/31/2015 06:00 AM    HGB 10 9 (L) 03/07/2022 05:12 AM    HGB 10 5 (L) 10/31/2015 06:00 AM    HCT 37 0 03/07/2022 05:12 AM    HCT 34 2 (L) 10/31/2015 06:00 AM    MCV 65 (L) 03/07/2022 05:12 AM    MCV 61 3 (L) 10/31/2015 06:00 AM     (H) 03/07/2022 05:12 AM     10/31/2015 06:00 AM     Temp Readings from Last 3 Encounters:   03/07/22 97 8 °F (36 6 °C)   03/03/22 (!) 97 3 °F (36 3 °C)   02/21/22 97 9 °F (36 6 °C) (Oral)     Vancomycin Days of Therapy: 1    Assessment/Plan  The patient is currently on vancomycin utilizing scheduled dosing based on actual body weight  Baseline risks associated with therapy include: concomitant nephrotoxic medications  The patient is currently receiving 1250mg IV q12h and after clinical evaluation will be changed to 1250mg IV q8h  Pharmacy will also follow closely for s/sx of nephrotoxicity, infusion reactions, and appropriateness of therapy  BMP and CBC will be ordered per protocol    Plan for trough as patient approaches steady state, prior to the 4th  dose at approximately 1600 on 3/8/22  Due to infection severity, will target a trough of 15-20 (appropriate for most indications)   Pharmacy will continue to follow the patients culture results and clinical progress daily      Catha Mahi, Pharmacist

## 2022-03-07 NOTE — PLAN OF CARE
Problem: Potential for Falls  Goal: Patient will remain free of falls  Description: INTERVENTIONS:  - Educate patient/family on patient safety including physical limitations  - Instruct patient to call for assistance with activity   - Consult OT/PT to assist with strengthening/mobility   - Keep Call bell within reach  - Keep bed low and locked with side rails adjusted as appropriate  - Keep care items and personal belongings within reach  - Initiate and maintain comfort rounds  - Make Fall Risk Sign visible to staff  - Apply yellow socks and bracelet for high fall risk patients  - Consider moving patient to room near nurses station  Outcome: Progressing     Problem: PAIN - ADULT  Goal: Verbalizes/displays adequate comfort level or baseline comfort level  Description: Interventions:  - Encourage patient to monitor pain and request assistance  - Assess pain using appropriate pain scale  - Administer analgesics based on type and severity of pain and evaluate response  - Implement non-pharmacological measures as appropriate and evaluate response  - Consider cultural and social influences on pain and pain management  - Notify physician/advanced practitioner if interventions unsuccessful or patient reports new pain  Outcome: Progressing     Problem: INFECTION - ADULT  Goal: Absence or prevention of progression during hospitalization  Description: INTERVENTIONS:  - Assess and monitor for signs and symptoms of infection  - Monitor lab/diagnostic results  - Monitor all insertion sites, i e  indwelling lines, tubes, and drains  - Monitor endotracheal if appropriate and nasal secretions for changes in amount and color  - [place] appropriate cooling/warming therapies per order  - Administer medications as ordered  - Instruct and encourage patient and family to use good hand hygiene technique  - Identify and instruct in appropriate isolation precautions for identified infection/condition  Outcome: Progressing  Goal: Absence of fever/infection during neutropenic period  Description: INTERVENTIONS:  - Monitor WBC    Outcome: Progressing     Problem: SAFETY ADULT  Goal: Patient will remain free of falls  Description: INTERVENTIONS:  - Educate patient/family on patient safety including physical limitations  - Instruct patient to call for assistance with activity   - Consult OT/PT to assist with strengthening/mobility   - Keep Call bell within reach  - Keep bed low and locked with side rails adjusted as appropriate  - Keep care items and personal belongings within reach  - Initiate and maintain comfort rounds  - Make Fall Risk Sign visible to staff  - Apply yellow socks and bracelet for high fall risk patients  - Consider moving patient to room near nurses station  Outcome: Progressing  Goal: Maintain or return to baseline ADL function  Description: INTERVENTIONS:  -  Assess patient's ability to carry out ADLs; assess patient's baseline for ADL function and identify physical deficits which impact ability to perform ADLs (bathing, care of mouth/teeth, toileting, grooming, dressing, etc )  - Assess/evaluate cause of self-care deficits   - Assess range of motion  - Assess patient's mobility; develop plan if impaired  - Assess patient's need for assistive devices and provide as appropriate  - Encourage maximum independence but intervene and supervise when necessary  - Involve family in performance of ADLs  - Assess for home care needs following discharge   - Consider OT consult to assist with ADL evaluation and planning for discharge  - Provide patient education as appropriate  Outcome: Progressing  Goal: Maintains/Returns to pre admission functional level  Description: INTERVENTIONS:  - Perform BMAT or MOVE assessment daily    - Set and communicate daily mobility goal to care team and patient/family/caregiver     - Collaborate with rehabilitation services on mobility goals if consulted  - Out of bed for toileting  - Record patient progress and toleration of activity level   Outcome: Progressing     Problem: DISCHARGE PLANNING  Goal: Discharge to home or other facility with appropriate resources  Description: INTERVENTIONS:  - Identify barriers to discharge w/patient and caregiver  - Arrange for needed discharge resources and transportation as appropriate  - Identify discharge learning needs (meds, wound care, etc )  - Arrange for interpretive services to assist at discharge as needed  - Refer to Case Management Department for coordinating discharge planning if the patient needs post-hospital services based on physician/advanced practitioner order or complex needs related to functional status, cognitive ability, or social support system  Outcome: Progressing     Problem: Knowledge Deficit  Goal: Patient/family/caregiver demonstrates understanding of disease process, treatment plan, medications, and discharge instructions  Description: Complete learning assessment and assess knowledge base    Interventions:  - Provide teaching at level of understanding  - Provide teaching via preferred learning methods  Outcome: Progressing

## 2022-03-07 NOTE — PLAN OF CARE
Problem: PAIN - ADULT  Goal: Verbalizes/displays adequate comfort level or baseline comfort level  Description: Interventions:  - Encourage patient to monitor pain and request assistance  - Assess pain using appropriate pain scale  - Administer analgesics based on type and severity of pain and evaluate response  - Implement non-pharmacological measures as appropriate and evaluate response  - Consider cultural and social influences on pain and pain management  - Notify physician/advanced practitioner if interventions unsuccessful or patient reports new pain  Outcome: Progressing     Problem: INFECTION - ADULT  Goal: Absence or prevention of progression during hospitalization  Description: INTERVENTIONS:  - Assess and monitor for signs and symptoms of infection  - Monitor lab/diagnostic results  - Monitor all insertion sites, i e  indwelling lines, tubes, and drains  - Monitor endotracheal if appropriate and nasal secretions for changes in amount and color  - [place] appropriate cooling/warming therapies per order  - Administer medications as ordered  - Instruct and encourage patient and family to use good hand hygiene technique  - Identify and instruct in appropriate isolation precautions for identified infection/condition  Outcome: Progressing  Goal: Absence of fever/infection during neutropenic period  Description: INTERVENTIONS:  - Monitor WBC    Outcome: Progressing     Problem: DISCHARGE PLANNING  Goal: Discharge to home or other facility with appropriate resources  Description: INTERVENTIONS:  - Identify barriers to discharge w/patient and caregiver  - Arrange for needed discharge resources and transportation as appropriate  - Identify discharge learning needs (meds, wound care, etc )  - Arrange for interpretive services to assist at discharge as needed  - Refer to Case Management Department for coordinating discharge planning if the patient needs post-hospital services based on physician/advanced practitioner order or complex needs related to functional status, cognitive ability, or social support system  Outcome: Progressing     Problem: Knowledge Deficit  Goal: Patient/family/caregiver demonstrates understanding of disease process, treatment plan, medications, and discharge instructions  Description: Complete learning assessment and assess knowledge base    Interventions:  - Provide teaching at level of understanding  - Provide teaching via preferred learning methods  Outcome: Progressing

## 2022-03-07 NOTE — PROGRESS NOTES
Julian 43  Progress Note - Padilla Fine 1995, 27 y o  male MRN: [id number]  Unit/Bed#: [address] [place] [address] Encounter: [id number]  Primary Care Provider: Abigail Holder DO   Date and time admitted to hospital: 3/6/2022  3:29 PM    * Tachycardia  Assessment & Plan  Noted to be in SVT upon arrival to ED   S/p adenosine x3 and cardizem infusion   ICU/cardiology recommendations appreciated   Monitor on telemetry   ECHO with normal systolic and diastolic function, echodensity on mitral valve            Pouchitis ([address] )  Assessment & Plan  S/p pouchoscopy along with TTS balloon dilatation on previous admission   Continue hydrocortisone enemas and pain regimen    Positive blood cultures  Assessment & Plan  Patient started treatment with cefazolin on   Continue Vancomycin   ID recommendations appreciated     Hypokalemia  Assessment & Plan  Monitor and replete prn      VTE Pharmacologic Prophylaxis:   Yes    Patient Centered Rounds: I performed bedside rounds with nursing staff today  Discussions with Specialists or Other Care Team Provider: Yes    Education and Discussions with Family / Patient: Yes    Time Spent for Care: 45 minutes  More than 50% of total time spent on counseling and coordination of care as described above  Current Length of Stay: 0 day(s)  Current Patient Status: Observation   Certification Statement: The patient will continue to require additional inpatient hospital stay due to bacteremia  Discharge Plan: Anticipate discharge in 24-48 hrs to home  Code Status: Level 1 - Full Code    Subjective: Tachycardia is improved     Objective:     Vitals:   Temp (24hrs), Av 4 °F (36 9 °C), Min:97 6 °F (36 4 °C), Max:99 7 °F (37 6 °C)    Temp:  [97 6 °F (36 4 °C)-99 7 °F (37 6 °C)] 97 8 °F (36 6 °C)  HR:  [] 108  Resp:  [14-24] 18  BP: (109-148)/() 141/92  SpO2:  [90 %-97 %] 96 %  Body mass index is 28 13 kg/m²       Input and Output Summary (last 24 hours): Intake/Output Summary (Last 24 hours) at 3/7/2022 1622  Last data filed at 3/6/2022 2018  Gross per 24 hour   Intake 1405 ml   Output 675 ml   Net 730 ml       Physical Exam:   Physical Exam  HENT:      Head: Normocephalic and atraumatic  Mouth/Throat:      Mouth: Mucous membranes are moist    Eyes:      Extraocular Movements: Extraocular movements intact  Cardiovascular:      Rate and Rhythm: Normal rate  Pulmonary:      Effort: Pulmonary effort is normal    Abdominal:      General: Abdomen is flat  Palpations: Abdomen is soft  Tenderness: There is abdominal tenderness  Skin:     General: Skin is warm and dry  Neurological:      Mental Status: He is alert  Additional Data:     Labs:  Results from last 7 days   Lab Units 03/07/22  0512   WBC Thousand/uL 15 98*   HEMOGLOBIN g/dL 10 9*   HEMATOCRIT % 37 0   PLATELETS Thousands/uL 509*   NEUTROS PCT % 78*   LYMPHS PCT % 15   MONOS PCT % 6   EOS PCT % 0     Results from last 7 days   Lab Units 03/07/22  0512 03/06/22  1555 03/06/22  1555   SODIUM mmol/L 138   < > 141   POTASSIUM mmol/L 4 2   < > 3 2*   CHLORIDE mmol/L 103   < > 105   CO2 mmol/L 27   < > 24   BUN mg/dL 6   < > 7   CREATININE mg/dL 1 05   < > 0 79   ANION GAP mmol/L 8   < > 12   CALCIUM mg/dL 8 5   < > 8 7   ALBUMIN g/dL  --   --  3 5   TOTAL BILIRUBIN mg/dL  --   --  0 31   ALK PHOS U/L  --   --  81   ALT U/L  --   --  32   AST U/L  --   --  26   GLUCOSE RANDOM mg/dL 96   < > 95    < > = values in this interval not displayed       Results from last 7 days   Lab Units 03/06/22  1555   INR  0 94             Results from last 7 days   Lab Units 03/07/22  0513 03/06/22  1555   LACTIC ACID mmol/L  --  1 9   PROCALCITONIN ng/ml <0 05 <0 05       Lines/Drains:  Invasive Devices  Report    Peripherally Inserted Central Catheter Line            PICC Line 03/01/22 Right Brachial 6 days          Peripheral Intravenous Line            Peripheral IV 03/06/22 Left Arm 1 day Central Line:  Goal for removal: Will discontinue when meds requiring line are completed  Telemetry:  Telemetry Orders (From admission, onward)             48 Hour Telemetry Monitoring  Continuous x 48 hours        References:    Telemetry Guidelines   Question:  Reason for 48 Hour Telemetry  Answer:  Arrhythmias Requiring Medical Therapy (eg  SVT, Vtach/fib, Bradycardia, Uncontrolled A-fib)                              Imaging: Reviewed radiology reports from this admission including: abdominal/pelvic CT    Recent Cultures (last 7 days):   Results from last 7 days   Lab Units 03/06/22  1555   BLOOD CULTURE  Received in Microbiology Lab  Culture in Progress  Received in Microbiology Lab  Culture in Progress  Last 24 Hours Medication List:   Current Facility-Administered Medications   Medication Dose Route Frequency Provider Last Rate    acetaminophen  650 mg Oral Q6H PRN Jitendra Solis, DO      ALPRAZolam  0 25 mg Oral BID PRN Jitendra Solis, DO      aluminum-magnesium hydroxide-simethicone  30 mL Oral Q4H PRN Darlin Revankar, DO      diphenhydrAMINE  25 mg Intravenous Q6H PRN Jitendra Solis, DO      DULoxetine  30 mg Oral Daily Jitendra Solis, DO      hydrocortisone  100 mg Rectal HS Jitendra Solis, DO      HYDROmorphone  1 mg Intravenous Q3H PRN Jitendra Solis, DO      methylPREDNISolone sodium succinate  125 mg Intravenous Daily Jitendra Solis, DO      ondansetron  4 mg Intravenous Q6H PRN Mary Loredo PA-C      oxyCODONE  2 5 mg Oral Q6H PRN Mary Loredo PA-C      vancomycin  15 mg/kg Intravenous Q12H Kristina Krishna MD          Today, Patient Was Seen By: Eunice Azul DO    **Please Note: This note may have been constructed using a voice recognition system  **

## 2022-03-07 NOTE — CONSULTS
Consultation - Infectious Disease   Tatiana Jamil 27 y o  male MRN: [id number]  Unit/Bed#: 71268 [facility] 415-01 Encounter: [id number]      IMPRESSION & RECOMMENDATIONS:   Impression/Recommendations: This is a 27 y o  male, history of UC, status post colectomy, recently admitted for acute enteritis with secondary polymicrobial bacteremia  Patient came back to ER yesterday due to hives, subsequently developing SVT  1  Polymicrobial alpha streptococcal bacteremia, felt to be secondary to translocation from gut  Patient is clinically much improved  Bacteremia had cleared prior to recent discharge  Continue IV antibiotic but change to IV vancomycin, as in below  Treat x4 weeks total (antibiotic # 12), through 3/23, as previously planned  2  Presumptive AV endocarditis  2D echo and BUCKY were not definitive but showed possible vegetation at base of AV  No evidence of septic emboli  Antibiotic plan as in above  3  Recent coagulase-negative Staphylococcus bacteremia, felt to be secondary to contaminated blood draw  No antibiotic or further workup needed for this  4  Hives, most likely secondary to cefazolin  Hives have resolved  Will complete antibiotic course with IV vancomycin  Change antibiotic to IV vancomycin  Monitor for recurrent hives or rash  5  Recent enteritis  Patient is clinically improved, although he still has mild RUQ abdominal pain  Monitor abdominal pain  6  UC, status post colectomy  7  Ankylosing spondylitis, on outpatient Chantal Moll  Recent hospitalization and ER notes reviewed in detail  Discussed with patient in detail regarding the above plan  Discussed with Dr Juan Arnold from [facility] service  Thank you for this consultation  We will follow along with you  HISTORY OF PRESENT ILLNESS:  Reason for Consult:  Bacteremia  HPI: Tatiana Jamil is a 27 y o  male, with history of UC, status post colectomy, was admitted here on 02/23 with acute enteritis  Patient also had sepsis and polymicrobial bacteremia, with both admission blood cultures having growth of 3 different alpha strep isolates and 1 isolate of coagulase-negative Staphylococcus  Patient was felt to have true alpha strep bacteremia secondary to translocation from gut  Patient was initially on IV vancomycin, transition to IV cefazolin  Coagulase-negative Staphylococcus bacteremia was felt to be contamination, but was susceptible to cefazolin anyway  2D echo, followed by BUCKY showed possible vegetation at base of AV  Therefore, plan was to treat patient with 4 weeks of IV antibiotic for presumptive endocarditis  He was discharged home on 03/03  Yesterday, patient developed diffuse hives  Therefore, he came to the ER for evaluation  Hives have resolved  However, while in ER, patient developed SVT  HR was slowed down by adenosine, subsequently maintained on diltiazem drip  Patient was admitted  Antibiotic was changed to IV vancomycin  We are asked to evaluate patient  At present, patient complains of stable chronic RLQ abdominal pain  No further hives or pruritus  No further palpitations  No fever or chills  REVIEW OF SYSTEMS:  A complete system-based review was done  Except for what is noted in HPI above, ROS of systems is otherwise negative  PAST MEDICAL HISTORY:  Past Medical History:   Diagnosis Date    [facility]     Anxiety     Bowel obstruction (HCC)     Clostridium difficile colitis 9/13/2018    Colitis     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/2018    Pancreatitis     Ulcerative colitis ([facility] )      Past Surgical History:   Procedure Laterality Date    COLECTOMY TOTAL      with ileal pouch and anastemosis    IR PICC PLACEMENT SINGLE LUMEN  3/1/2022    TOTAL COLECTOMY       Problem list reviewed      FAMILY HISTORY:  Non-contributory    SOCIAL HISTORY:  Social History     Substance and Sexual Activity   Alcohol Use Yes    Comment: pt states 5 a month/socially     Social History     Substance and Sexual Activity   Drug Use No     Social History     Tobacco Use   Smoking Status Never Smoker   Smokeless Tobacco Never Used       ALLERGIES:  Allergies   Allergen Reactions    Mesalamine GI Intolerance     Other reaction(s): Pancreatitis  Annotation - [id number]: pancreatitis       MEDICATIONS:  All current active medications have been reviewed  Patient is currently on IV vancomycin  PHYSICAL EXAM:  Vitals:  Temp:  [97 6 °F (36 4 °C)-99 7 °F (37 6 °C)] 97 8 °F (36 6 °C)  HR:  [] 108  Resp:  [14-24] 18  BP: (109-148)/() 141/92  SpO2:  [90 %-98 %] 96 %  Temp (24hrs), Av 4 °F (36 9 °C), Min:97 6 °F (36 4 °C), Max:99 7 °F (37 6 °C)  Current: Temperature: 97 8 °F (36 6 °C)     Physical Exam:  General:  Well-nourished, well-developed, in no acute distress  Awake, alert and oriented x 3  Eyes:  Conjunctive clear with no hemorrhages or effusions  Oropharynx:  No ulcers, no lesions, pharynx benign, no tonsillitis  Neck:  Supple, no lymphadenopathy, no mass, nontender  Lungs:  Expansion symmetric, no rales, no wheezing, no accessory muscle use  Cardiac:  Tachycardic with regular rhythm, normal S1, normal S2, no murmurs  Abdomen:  Soft, nondistended, stable mild RLQ tenderness, no HSM  Extremities:  No edema, no erythema, nontender  No ulcers  Skin:  No rashes, no ulcers  Neurological:  Moves all four extremities spontaneously, sensation grossly intact    LABS, IMAGING, & OTHER STUDIES:  Lab Results:  I have personally reviewed pertinent labs    Results from last 7 days   Lab Units 22  0512 22  1555 22  0546   POTASSIUM mmol/L 4 2 3 2* 3 7   CHLORIDE mmol/L 103 105 104   CO2 mmol/L 27 24 29   BUN mg/dL 6 7 4*   CREATININE mg/dL 1 05 0 79 0 98   EGFR ml/min/1 73sq m 96 122 104   CALCIUM mg/dL 8 5 8 7 8 1*   AST U/L  --  26  --    ALT U/L  --  32  --    ALK PHOS U/L  --  81  --      Results from last 7 days   Lab Units 22  0563 03/06/22  1555 03/01/22  0546   WBC Thousand/uL 15 98* 11 06* 7 81   HEMOGLOBIN g/dL 10 9* 12 3 10 4*   PLATELETS Thousands/uL 509* 612* 393*     Results from last 7 days   Lab Units 03/06/22  1555   BLOOD CULTURE  Received in Microbiology Lab  Culture in Progress  Received in Microbiology Lab  Culture in Progress  Imaging Studies:   I have personally reviewed pertinent imaging study reports and images in PACS  EKG, Pathology, and Other Studies:   I have personally reviewed pertinent reports

## 2022-03-07 NOTE — CONSULTS
Consultation - Cardiology   Maribell Vargas 27 y o  male MRN: [id number]  Unit/Bed#: 91217 [facility] 415-01 Encounter: [id number]    Assessment/Plan     Assessment:  1  Sinus tachycardia secondary to histamine release and catecholamine surge of allergic reaction  2  Drug reaction  3  Polymicrobial bacteremia secondary to pouchitis/ulcerative colitis  4  Alkylosing Spondylitis on Cleda Ely (biologic immunosuppressant)      Plan:  Patient has been admitted to the hospitalist service  1  Continue monitor telemetry for any recurrence of tachycardia or bradycardia  2  IV antibiotics per primary team    3  Would hold AV mohini blocking medication at this time and continue to monitor  History of Present Illness   Physician Requesting Consult: [facility],   Reason for Consult / Principal Problem:  Sinus tachycardia in the setting of allergic reaction most likely due to IV Ancef      HPI: Maribell Vargas is a 27y o  year old male who presented to the emergency room for redness and hives of his skin which developed after taking his 8:00 a m  Dose of IV Ancef  Patient was recently discharged from the hospital with 4 weeks of IV Ancef therapy due to bacteremia secondary to enteritis and acute ileal pouchitis  He states he has been taking his Ancef as ordered, he got up to take is 8 a m  Dose yesterday and infused without any difficulty  He went back to bed, and awoke again at around 9:30 a m , he noted that his skin was feeling tingly all over  He was out with his brother-in-law later in the day who noted that he had redness of his face and looked as if he had a sudden burden  After short  Later he began to develop hives and itching of his skin  He came to the emergency room for evaluation  While sitting in the emergency room waiting room he states he suddenly did not feel well and felt his heart began to race  He was taken into the back in the emergency room for evaluation and treatment    He was noted to be in sinus tachycardia  EKGs in patient's chart demonstrate heart rates as high as 140  He was given adenosine x3 by the emergency room staff and then started on a Cardizem drip  He was evaluated by the ICU staff and felt stable to go to the [facility] floor  Cardizem was discontinued in the emergency room  Patient has been admitted, his Ancef is currently on hold  He was given a single dose of vancomycin in the emergency room  Patient denies any further complaints of palpitations  Overnight heart rates were in the low 100s better slowly trending down  His rhythm has been sinus in orientation  At this time is only complaint is of persistent pruritus  Inpatient consult to Cardiology  Consult performed by: [place] Huertas  Consult ordered by: Eliza Tinsley DO          Review of Systems   Constitutional: Negative  Negative for activity change, appetite change, fatigue and fever  HENT: Negative  Negative for congestion, facial swelling, postnasal drip and sneezing  Eyes: Negative  Negative for photophobia and visual disturbance  Respiratory: Negative  Negative for chest tightness and shortness of breath  Cardiovascular: Negative  Negative for chest pain, palpitations and leg swelling  Gastrointestinal: Positive for abdominal pain, constipation, diarrhea, nausea and rectal pain  Endocrine: Negative  Negative for polydipsia, polyphagia and polyuria  Genitourinary: Negative  Negative for difficulty urinating  Musculoskeletal: Negative  Neurological: Negative  Negative for dizziness, syncope, speech difficulty, weakness and light-headedness  Hematological: Negative  Psychiatric/Behavioral: Negative          Historical Information   Past Medical History:   Diagnosis Date    [facility] ([facility] )     Anxiety     Bowel obstruction (HCC)     Clostridium difficile colitis 9/13/2018    Colitis     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[facility] ) 9/13/2018    Pancreatitis     Ulcerative colitis [facility])      Past Surgical History:   Procedure Laterality Date    COLECTOMY TOTAL      with ileal pouch and anastemosis    IR PICC PLACEMENT SINGLE LUMEN  3/1/2022    TOTAL COLECTOMY       Social History     Substance and Sexual Activity   Alcohol Use Yes    Comment: pt states 5 a month/socially     Social History     Substance and Sexual Activity   Drug Use No     E-Cigarette/Vaping    E-Cigarette Use Never User      E-Cigarette/Vaping Substances    Nicotine No     THC No     CBD No     Flavoring No     Other No     Unknown No      Social History     Tobacco Use   Smoking Status Never Smoker   Smokeless Tobacco Never Used     Family History: History reviewed  No pertinent family history  Meds/Allergies   all current active meds have been reviewed, current meds:   Current Facility-Administered Medications   Medication Dose Route Frequency    acetaminophen (TYLENOL) tablet 650 mg  650 mg Oral Q6H PRN    ALPRAZolam (XANAX) tablet 0 25 mg  0 25 mg Oral BID PRN    aluminum-magnesium hydroxide-simethicone (MYLANTA) oral suspension 30 mL  30 mL Oral Q4H PRN    diphenhydrAMINE (BENADRYL) injection 25 mg  25 mg Intravenous Q6H PRN    DULoxetine (CYMBALTA) delayed release capsule 30 mg  30 mg Oral Daily    hydrocortisone (CORTENEMA) enema 100 mg  100 mg Rectal HS    HYDROmorphone (DILAUDID) injection 1 mg  1 mg Intravenous Q4H PRN    metoprolol (LOPRESSOR) injection 5 mg  5 mg Intravenous Q6H PRN    ondansetron (ZOFRAN) injection 4 mg  4 mg Intravenous Q6H PRN    oxyCODONE (ROXICODONE) IR tablet 2 5 mg  2 5 mg Oral Q6H PRN    and PTA meds:   Prior to Admission Medications   Prescriptions Last Dose Informant Patient Reported? Taking?    DULoxetine (CYMBALTA) 30 mg delayed release capsule 3/6/2022 at 0800  Yes Yes   Sig: daily   Tofacitinib Citrate ER (Xeljanz XR) 11 MG TB24 2/23/2022  Yes Yes   Sig: Take by mouth daily   ceFAZolin (ANCEF) 2000 mg IVPB 3/6/2022 at 0800  No Yes   Sig: Infuse 2,000 mg into a venous catheter every 8 (eight) hours for 20 days   hydrocortisone (CORTENEMA) 100 mg/60 mL enema Not Taking at Unknown time  No No   Sig: Insert 60 mL (100 mg total) into the rectum daily at bedtime for 7 days   Patient not taking: Reported on 3/6/2022    ondansetron (ZOFRAN) 4 mg tablet Past Week at Unknown time  No Yes   Sig: Take 1 tablet (4 mg total) by mouth every 8 (eight) hours as needed for nausea or vomiting   ondansetron (Zofran ODT) 4 mg disintegrating tablet Not Taking at Unknown time  No No   Sig: Take 1 tablet (4 mg total) by mouth every 6 (six) hours as needed for nausea or vomiting   Patient not taking: Reported on 3/6/2022    oxyCODONE (Roxicodone) 5 immediate release tablet Not Taking at Unknown time  No No   Sig: Take 0 5 tablets (2 5 mg total) by mouth every 6 (six) hours as needed for severe pain for up to 5 days Max Daily Amount: 10 mg   Patient not taking: Reported on 3/6/2022       Facility-Administered Medications: None     Allergies   Allergen Reactions    Mesalamine GI Intolerance     Other reaction(s): Pancreatitis  [facility] - [id number]: pancreatitis       Objective   Vitals:  Blood pressure 132/77, heart rate 89 and respiratory rate 19  Orthostatic Blood Pressures      Most Recent Value   Blood Pressure 132/77 filed at 03/07/2022 9801   Patient Position - Orthostatic VS Lying filed at 03/07/2022 0234            Intake/Output Summary (Last 24 hours) at 3/7/2022 0900  Last data filed at 3/6/2022 2018  Gross per 24 hour   Intake 1405 ml   Output 675 ml   Net 730 ml       Invasive Devices  Report    Peripherally Inserted Central Catheter Line            PICC Line 03/01/22 Right Brachial 5 days          Peripheral Intravenous Line            Peripheral IV 03/06/22 Left Arm <1 day                Physical Exam  Vitals and nursing note reviewed  Constitutional:       General: He is not in acute distress  Appearance: Normal appearance  He is normal weight     HENT:      Head: Normocephalic  Right Ear: External ear normal       Left Ear: External ear normal       Nose: Nose normal    Eyes:      General: No scleral icterus  Right eye: No discharge  Left eye: No discharge  Cardiovascular:      Rate and Rhythm: Normal rate and regular rhythm  Pulses: Normal pulses  Heart sounds: Normal heart sounds  Pulmonary:      Effort: Pulmonary effort is normal  No respiratory distress  Breath sounds: Normal breath sounds  No wheezing, rhonchi or rales  Abdominal:      General: Bowel sounds are normal  There is no distension  Palpations: Abdomen is soft  Musculoskeletal:      Right lower leg: No edema  Left lower leg: No edema  Skin:     General: Skin is warm and dry  Capillary Refill: Capillary refill takes less than 2 seconds  Neurological:      General: No focal deficit present  Mental Status: He is alert and oriented to person, place, and time  Mental status is at baseline  Psychiatric:         Mood and Affect: Mood normal          Lab Results:   I have personally reviewed pertinent lab results  CBC with diff:   Results from last 7 days   Lab Units 03/07/22  0512   WBC Thousand/uL 15 98*   RBC Million/uL 5 72*   HEMOGLOBIN g/dL 10 9*   HEMATOCRIT % 37 0   MCV fL 65*   MCH pg 19 1*   MCHC g/dL 29 5*   RDW % 18 4*   MPV fL 8 1*   PLATELETS Thousands/uL 509*     CMP:   Results from last 7 days   Lab Units 03/07/22  0512 03/06/22  1555 03/06/22  1555   SODIUM mmol/L 138   < > 141   CHLORIDE mmol/L 103   < > 105   CO2 mmol/L 27   < > 24   BUN mg/dL 6   < > 7   CREATININE mg/dL 1 05   < > 0 79   CALCIUM mg/dL 8 5   < > 8 7   AST U/L  --   --  26   ALT U/L  --   --  32   ALK PHOS U/L  --   --  81   EGFR ml/min/1 73sq m 96   < > 122    < > = values in this interval not displayed       HS Troponin:   0   Lab Value Date/Time    HSTNI0 10 03/06/2022 2020    HSTNI2 11 03/06/2022 2115    HSTNI4 9 03/06/2022 2330     BNP:   Results from last 7 days   Lab Units 22  0512   POTASSIUM mmol/L 4 2   CHLORIDE mmol/L 103   CO2 mmol/L 27   BUN mg/dL 6   CREATININE mg/dL 1 05   CALCIUM mg/dL 8 5   EGFR ml/min/1 73sq m 96     Coags:   Results from last 7 days   Lab Units 22  1555   PTT seconds 30   INR  0 94     TSH:     Magnesium:   Results from last 7 days   Lab Units 22  1555   MAGNESIUM mg/dL 2 1     Lipid Profile:     Imaging: I have personally reviewed pertinent reports  EK lead EKGs in patient's chart demonstrates sinus tachycardia rate 100 to 140s    VTE Prophylaxis: Sequential compression device Brooks Shen     Code Status: Level 1 - Full Code  Advance Directive and Living Will:      Power of :    POLST:      Khushi Garsia, [facility]  Cardiology

## 2022-03-07 NOTE — ASSESSMENT & PLAN NOTE
Patient started treatment with cefazolin on 02/28  Continue Vancomycin   ID recommendations appreciated

## 2022-03-08 ENCOUNTER — TELEPHONE (OUTPATIENT)
Dept: CARDIOLOGY CLINIC | Facility: CLINIC | Age: 27
End: 2022-03-08

## 2022-03-08 ENCOUNTER — TELEPHONE (OUTPATIENT)
Dept: INFECTIOUS DISEASES | Facility: CLINIC | Age: 27
End: 2022-03-08

## 2022-03-08 PROBLEM — B95.7 COAGULASE NEGATIVE STAPHYLOCOCCUS BACTEREMIA: Status: ACTIVE | Noted: 2022-03-08

## 2022-03-08 PROBLEM — R78.81 COAGULASE NEGATIVE STAPHYLOCOCCUS BACTEREMIA: Status: ACTIVE | Noted: 2022-03-08

## 2022-03-08 PROBLEM — L50.9 HIVES: Status: ACTIVE | Noted: 2022-03-08

## 2022-03-08 PROBLEM — I33.0 INFECTIVE ENDOCARDITIS: Status: ACTIVE | Noted: 2022-03-08

## 2022-03-08 LAB
AMPHETAMINES SERPL QL SCN: NEGATIVE
ANION GAP SERPL CALCULATED.3IONS-SCNC: 8 MMOL/L (ref 4–13)
ATRIAL RATE: 122 BPM
BARBITURATES UR QL: NEGATIVE
BASOPHILS # BLD AUTO: 0.05 THOUSANDS/ΜL (ref 0–0.1)
BASOPHILS NFR BLD AUTO: 0 % (ref 0–1)
BENZODIAZ UR QL: POSITIVE
BUN SERPL-MCNC: 7 MG/DL (ref 5–25)
CALCIUM SERPL-MCNC: 8.3 MG/DL (ref 8.3–10.1)
CHLORIDE SERPL-SCNC: 102 MMOL/L (ref 100–108)
CO2 SERPL-SCNC: 27 MMOL/L (ref 21–32)
COCAINE UR QL: NEGATIVE
CREAT SERPL-MCNC: 0.96 MG/DL (ref 0.6–1.3)
EOSINOPHIL # BLD AUTO: 0.01 THOUSAND/ΜL (ref 0–0.61)
EOSINOPHIL NFR BLD AUTO: 0 % (ref 0–6)
ERYTHROCYTE [DISTWIDTH] IN BLOOD BY AUTOMATED COUNT: 17.8 % (ref 11.6–15.1)
GFR SERPL CREATININE-BSD FRML MDRD: 107 ML/MIN/1.73SQ M
GLUCOSE SERPL-MCNC: 117 MG/DL (ref 65–140)
HCT VFR BLD AUTO: 36.1 % (ref 36.5–49.3)
HGB BLD-MCNC: 10.5 G/DL (ref 12–17)
IMM GRANULOCYTES # BLD AUTO: 0.07 THOUSAND/UL (ref 0–0.2)
IMM GRANULOCYTES NFR BLD AUTO: 0 % (ref 0–2)
LYMPHOCYTES # BLD AUTO: 1.42 THOUSANDS/ΜL (ref 0.6–4.47)
LYMPHOCYTES NFR BLD AUTO: 8 % (ref 14–44)
MCH RBC QN AUTO: 18.9 PG (ref 26.8–34.3)
MCHC RBC AUTO-ENTMCNC: 29.1 G/DL (ref 31.4–37.4)
MCV RBC AUTO: 65 FL (ref 82–98)
METHADONE UR QL: NEGATIVE
MONOCYTES # BLD AUTO: 0.95 THOUSAND/ΜL (ref 0.17–1.22)
MONOCYTES NFR BLD AUTO: 5 % (ref 4–12)
NEUTROPHILS # BLD AUTO: 15.55 THOUSANDS/ΜL (ref 1.85–7.62)
NEUTS SEG NFR BLD AUTO: 87 % (ref 43–75)
NRBC BLD AUTO-RTO: 0 /100 WBCS
OPIATES UR QL SCN: POSITIVE
OXYCODONE+OXYMORPHONE UR QL SCN: NEGATIVE
PCP UR QL: NEGATIVE
PLATELET # BLD AUTO: 518 THOUSANDS/UL (ref 149–390)
PMV BLD AUTO: 8.3 FL (ref 8.9–12.7)
POTASSIUM SERPL-SCNC: 4.2 MMOL/L (ref 3.5–5.3)
QRS AXIS: -1 DEGREES
QRSD INTERVAL: 80 MS
QT INTERVAL: 306 MS
QTC INTERVAL: 439 MS
RBC # BLD AUTO: 5.57 MILLION/UL (ref 3.88–5.62)
SODIUM SERPL-SCNC: 137 MMOL/L (ref 136–145)
T WAVE AXIS: -15 DEGREES
THC UR QL: NEGATIVE
TSH SERPL DL<=0.05 MIU/L-ACNC: 1.38 UIU/ML (ref 0.36–3.74)
VENTRICULAR RATE: 124 BPM
WBC # BLD AUTO: 18.05 THOUSAND/UL (ref 4.31–10.16)

## 2022-03-08 PROCEDURE — 80307 DRUG TEST PRSMV CHEM ANLYZR: CPT | Performed by: STUDENT IN AN ORGANIZED HEALTH CARE EDUCATION/TRAINING PROGRAM

## 2022-03-08 PROCEDURE — 93010 ELECTROCARDIOGRAM REPORT: CPT | Performed by: INTERNAL MEDICINE

## 2022-03-08 PROCEDURE — 99225 PR SBSQ OBSERVATION CARE/DAY 25 MINUTES: CPT | Performed by: INTERNAL MEDICINE

## 2022-03-08 PROCEDURE — 99214 OFFICE O/P EST MOD 30 MIN: CPT | Performed by: INTERNAL MEDICINE

## 2022-03-08 PROCEDURE — 84443 ASSAY THYROID STIM HORMONE: CPT | Performed by: INTERNAL MEDICINE

## 2022-03-08 PROCEDURE — 80048 BASIC METABOLIC PNL TOTAL CA: CPT | Performed by: STUDENT IN AN ORGANIZED HEALTH CARE EDUCATION/TRAINING PROGRAM

## 2022-03-08 PROCEDURE — 85025 COMPLETE CBC W/AUTO DIFF WBC: CPT | Performed by: STUDENT IN AN ORGANIZED HEALTH CARE EDUCATION/TRAINING PROGRAM

## 2022-03-08 RX ORDER — DOCUSATE SODIUM 100 MG/1
100 CAPSULE, LIQUID FILLED ORAL 2 TIMES DAILY
Status: DISCONTINUED | OUTPATIENT
Start: 2022-03-08 | End: 2022-03-08

## 2022-03-08 RX ORDER — PROMETHAZINE HYDROCHLORIDE 25 MG/ML
25 INJECTION, SOLUTION INTRAMUSCULAR; INTRAVENOUS EVERY 6 HOURS PRN
Status: DISCONTINUED | OUTPATIENT
Start: 2022-03-08 | End: 2022-03-08

## 2022-03-08 RX ORDER — DOCUSATE SODIUM 100 MG/1
100 CAPSULE, LIQUID FILLED ORAL 2 TIMES DAILY PRN
Status: DISCONTINUED | OUTPATIENT
Start: 2022-03-08 | End: 2022-03-10

## 2022-03-08 RX ORDER — PROMETHAZINE HYDROCHLORIDE 25 MG/ML
12.5 INJECTION, SOLUTION INTRAMUSCULAR; INTRAVENOUS EVERY 6 HOURS PRN
Status: DISCONTINUED | OUTPATIENT
Start: 2022-03-08 | End: 2022-03-11

## 2022-03-08 RX ADMIN — DIPHENHYDRAMINE HYDROCHLORIDE 25 MG: 50 INJECTION, SOLUTION INTRAMUSCULAR; INTRAVENOUS at 16:36

## 2022-03-08 RX ADMIN — HYDROCORTISONE 100 MG: 100 ENEMA RECTAL at 21:20

## 2022-03-08 RX ADMIN — DIPHENHYDRAMINE HYDROCHLORIDE 25 MG: 50 INJECTION, SOLUTION INTRAMUSCULAR; INTRAVENOUS at 08:04

## 2022-03-08 RX ADMIN — FAMOTIDINE 20 MG: 10 INJECTION INTRAVENOUS at 09:06

## 2022-03-08 RX ADMIN — VANCOMYCIN HYDROCHLORIDE 1250 MG: 10 INJECTION, POWDER, LYOPHILIZED, FOR SOLUTION INTRAVENOUS at 16:36

## 2022-03-08 RX ADMIN — PROMETHAZINE HYDROCHLORIDE 12.5 MG: 25 INJECTION INTRAMUSCULAR; INTRAVENOUS at 21:20

## 2022-03-08 RX ADMIN — PROMETHAZINE HYDROCHLORIDE 25 MG: 25 INJECTION INTRAMUSCULAR; INTRAVENOUS at 09:05

## 2022-03-08 RX ADMIN — OXYCODONE HYDROCHLORIDE 2.5 MG: 5 TABLET ORAL at 05:58

## 2022-03-08 RX ADMIN — HYDROMORPHONE HYDROCHLORIDE 1 MG: 1 INJECTION, SOLUTION INTRAMUSCULAR; INTRAVENOUS; SUBCUTANEOUS at 07:58

## 2022-03-08 RX ADMIN — SODIUM CHLORIDE 100 ML/HR: 9 INJECTION, SOLUTION INTRAVENOUS at 09:15

## 2022-03-08 RX ADMIN — HYDROMORPHONE HYDROCHLORIDE 1 MG: 1 INJECTION, SOLUTION INTRAMUSCULAR; INTRAVENOUS; SUBCUTANEOUS at 14:13

## 2022-03-08 RX ADMIN — DOCUSATE SODIUM 100 MG: 100 CAPSULE ORAL at 09:19

## 2022-03-08 RX ADMIN — HYDROMORPHONE HYDROCHLORIDE 1 MG: 1 INJECTION, SOLUTION INTRAMUSCULAR; INTRAVENOUS; SUBCUTANEOUS at 21:21

## 2022-03-08 RX ADMIN — HYDROMORPHONE HYDROCHLORIDE 1 MG: 1 INJECTION, SOLUTION INTRAMUSCULAR; INTRAVENOUS; SUBCUTANEOUS at 04:10

## 2022-03-08 RX ADMIN — DULOXETINE HYDROCHLORIDE 30 MG: 30 CAPSULE, DELAYED RELEASE ORAL at 09:06

## 2022-03-08 RX ADMIN — ALPRAZOLAM 0.25 MG: 0.25 TABLET ORAL at 09:15

## 2022-03-08 RX ADMIN — FAMOTIDINE 20 MG: 10 INJECTION INTRAVENOUS at 21:20

## 2022-03-08 RX ADMIN — HYDROMORPHONE HYDROCHLORIDE 1 MG: 1 INJECTION, SOLUTION INTRAMUSCULAR; INTRAVENOUS; SUBCUTANEOUS at 00:57

## 2022-03-08 RX ADMIN — HYDROMORPHONE HYDROCHLORIDE 1 MG: 1 INJECTION, SOLUTION INTRAMUSCULAR; INTRAVENOUS; SUBCUTANEOUS at 18:09

## 2022-03-08 RX ADMIN — ONDANSETRON 4 MG: 2 INJECTION INTRAMUSCULAR; INTRAVENOUS at 05:58

## 2022-03-08 NOTE — ED PROVIDER NOTES
History  Chief Complaint   Patient presents with    Allergic Reaction     Pt experiencing hives to neck and abdominal pain after administering cefazolin  Pt does not c/o SOB  HPI  Patient is a 27year old male history of Crohn's disease with frequent strictures, s/p bowel resection, ankylosing spondylitis, recent prolonged hospital admission for positive blood cultures and possible endocarditis now on IV ancef presenting for evaluation of rash, palpitations, abdominal pain following self administration of ancef  Patient states that roughly an hour after administering ancef his symptoms developed  Patient denies associated wheezing, shortness of breath, throat or tongue swelling  Patient denies pruritic component of rash  Patient denies prior reaction to ancef or additional known allergy  Patient denies associated chest pain, diaphoresis, syncope or near syncope, denies lower extremity pain or swelling, history of DVT/PE, states ongoing anticoagulation, denies fevers, chills, fatigue, rigors  Prior to Admission Medications   Prescriptions Last Dose Informant Patient Reported? Taking?    DULoxetine (CYMBALTA) 30 mg delayed release capsule 3/6/2022 at 0800  Yes Yes   Sig: daily   Tofacitinib Citrate ER (Xeljanz XR) 11 MG TB24 2/23/2022  Yes Yes   Sig: Take by mouth daily   ceFAZolin (ANCEF) 2000 mg IVPB 3/6/2022 at 0800  No Yes   Sig: Infuse 2,000 mg into a venous catheter every 8 (eight) hours for 20 days   hydrocortisone (CORTENEMA) 100 mg/60 mL enema Not Taking at Unknown time  No No   Sig: Insert 60 mL (100 mg total) into the rectum daily at bedtime for 7 days   Patient not taking: Reported on 3/6/2022    ondansetron (ZOFRAN) 4 mg tablet Past Week at Unknown time  No Yes   Sig: Take 1 tablet (4 mg total) by mouth every 8 (eight) hours as needed for nausea or vomiting   ondansetron (Zofran ODT) 4 mg disintegrating tablet Not Taking at Unknown time  No No   Sig: Take 1 tablet (4 mg total) by mouth every 6 (six) hours as needed for nausea or vomiting   Patient not taking: Reported on 3/6/2022    oxyCODONE (Roxicodone) 5 immediate release tablet Not Taking at Unknown time  No No   Sig: Take 0 5 tablets (2 5 mg total) by mouth every 6 (six) hours as needed for severe pain for up to 5 days Max Daily Amount: 10 mg   Patient not taking: Reported on 3/6/2022       Facility-Administered Medications: None       Past Medical History:   Diagnosis Date    Ankylosing spondylitis ([place] [address] )     Anxiety     Bowel obstruction (HCC)     Clostridium difficile colitis 9/13/2018    Colitis     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[facility] ) 9/13/2018    Pancreatitis     Ulcerative colitis ([place] [address] )        Past Surgical History:   Procedure Laterality Date    COLECTOMY TOTAL      with ileal pouch and anastemosis    IR PICC PLACEMENT SINGLE LUMEN  3/1/2022    TOTAL COLECTOMY         History reviewed  No pertinent family history  I have reviewed and agree with the history as documented  E-Cigarette/Vaping    E-Cigarette Use Never User      E-Cigarette/Vaping Substances    Nicotine No     THC No     CBD No     Flavoring No     Other No     Unknown No      Social History     Tobacco Use    Smoking status: Never Smoker    Smokeless tobacco: Never Used   Vaping Use    Vaping Use: Never used   Substance Use Topics    Alcohol use: Yes     Comment: pt states 5 a month/socially    Drug use: No       Review of Systems   Constitutional: Negative for chills, fatigue and fever  HENT: Negative for congestion, rhinorrhea and sore throat  Eyes: Negative for photophobia and visual disturbance  Respiratory: Negative for chest tightness and shortness of breath  Cardiovascular: Positive for palpitations  Negative for chest pain and leg swelling  Gastrointestinal: Positive for abdominal pain  Negative for abdominal distention, diarrhea, nausea and vomiting  Endocrine: Negative for polydipsia and polyuria  Genitourinary: Negative for dysuria and hematuria  Musculoskeletal: Negative for arthralgias and myalgias  Skin: Positive for rash  Negative for color change, pallor and wound  Neurological: Negative for weakness, numbness and headaches  Psychiatric/Behavioral: Negative for confusion  The patient is nervous/anxious  Physical Exam  Physical Exam  Vitals and nursing note reviewed  Constitutional:       General: He is not in acute distress  Appearance: He is well-developed  He is not diaphoretic  Comments: Anxious appearing but non-distressed    HENT:      Head: Normocephalic and atraumatic  Comments: Moist mucous membranes      Right Ear: External ear normal       Left Ear: External ear normal       Nose: Nose normal       Mouth/Throat:      Pharynx: No oropharyngeal exudate  Eyes:      Conjunctiva/sclera: Conjunctivae normal       Pupils: Pupils are equal, round, and reactive to light  Cardiovascular:      Rate and Rhythm: Normal rate and regular rhythm  Heart sounds: Normal heart sounds  No murmur heard  No friction rub  No gallop  Comments: Ranging between sinus tachycardia rate of around 120 to HR of 160-170 no appreciable p waves on monitor  Extremities warm and well-perfused   Pulmonary:      Effort: Pulmonary effort is normal  No respiratory distress  Breath sounds: Normal breath sounds  No wheezing  Comments: Lungs CTAB  No wheezes, rales, rhonchi   Chest:      Chest wall: No tenderness  Abdominal:      General: Bowel sounds are normal  There is no distension  Palpations: Abdomen is soft  There is no mass  Tenderness: There is abdominal tenderness  There is no guarding or rebound  Comments: Moderate epigastric and LUQ tenderness without rigidity, rebound, guarding   Musculoskeletal:         General: No deformity  Comments: No lower extremity swelling, erythema, or calf tenderness    Skin:     General: Skin is warm and dry        Capillary Refill: Capillary refill takes less than 2 seconds  Neurological:      Mental Status: He is alert and oriented to person, place, and time        Comments: AAOx4    Psychiatric:         Behavior: Behavior normal          Vital Signs  ED Triage Vitals [03/06/22 1525]   Temperature Pulse Respirations Blood Pressure SpO2   98 2 °F (36 8 °C) (!) 128 18 143/91 97 %      Temp Source Heart Rate Source Patient Position - Orthostatic VS BP Location FiO2 (%)   Tympanic Monitor Lying Left arm --      Pain Score       8           Vitals:    03/07/22 1937 03/07/22 2315 03/08/22 0404 03/08/22 0748   BP: 113/96 134/86 130/62 135/92   Pulse: 96 85 72 98   Patient Position - Orthostatic VS:             Visual Acuity      ED Medications  Medications   hydrocortisone (CORTENEMA) enema 100 mg (100 mg Rectal Given 3/7/22 2118)   DULoxetine (CYMBALTA) delayed release capsule 30 mg (30 mg Oral Given 3/8/22 0906)   diphenhydrAMINE (BENADRYL) injection 25 mg (25 mg Intravenous Given 3/8/22 0804)   acetaminophen (TYLENOL) tablet 650 mg (has no administration in time range)   oxyCODONE (ROXICODONE) IR tablet 2 5 mg (2 5 mg Oral Given 3/8/22 0558)   ondansetron (ZOFRAN) injection 4 mg (4 mg Intravenous Given 3/8/22 0558)   aluminum-magnesium hydroxide-simethicone (MYLANTA) oral suspension 30 mL (30 mL Oral Given 3/6/22 2325)   ALPRAZolam (XANAX) tablet 0 25 mg (0 25 mg Oral Given 3/8/22 0915)   HYDROmorphone (DILAUDID) injection 1 mg (1 mg Intravenous Given 3/8/22 0758)   metoprolol (LOPRESSOR) injection 5 mg (5 mg Intravenous Given 3/7/22 1719)   sodium chloride 0 9 % infusion (100 mL/hr Intravenous New Bag 3/8/22 0915)   docusate sodium (COLACE) capsule 100 mg (100 mg Oral Given 3/8/22 0919)   famotidine (PEPCID) injection 20 mg (20 mg Intravenous Given 3/8/22 0906)   promethazine (PHENERGAN) injection 25 mg (25 mg Intravenous Given 3/8/22 0905)   sodium chloride 0 9 % bolus 1,000 mL (0 mL Intravenous Stopped 3/6/22 7066)   HYDROmorphone (DILAUDID) injection 1 mg (1 mg Intravenous Given 3/6/22 1607)   HYDROmorphone (DILAUDID) injection 1 mg (1 mg Intravenous Given 3/6/22 1713)   adenosine (ADENOCARD) injection 6 mg (6 mg Intravenous Given 3/6/22 1655)   adenosine (ADENOCARD) injection 12 mg ( Intravenous Canceled Entry 3/6/22 1705)   diltiazem (CARDIZEM) injection 20 mg (20 mg Intravenous Given 3/6/22 1710)   diltiazem (CARDIZEM) 125 mg in sodium chloride 0 9 % 125 mL infusion (0 mg/hr Intravenous Stopped 3/6/22 1750)   adenosine (ADENOCARD) injection 12 mg (12 mg Intravenous Given 3/6/22 1705)   magnesium sulfate 2 g/50 mL IVPB (premix) 2 g (0 g Intravenous Stopped 3/6/22 2013)   potassium chloride 20 mEq IVPB (premix) (0 mEq Intravenous Stopped 3/6/22 2004)     Followed by   potassium chloride 20 mEq IVPB (premix) (20 mEq Intravenous New Bag 3/6/22 2004)   LORazepam (ATIVAN) injection 1 mg (1 mg Intravenous Given 3/6/22 1738)   HYDROmorphone (DILAUDID) injection 1 mg (1 mg Intravenous Given 3/6/22 1826)   vancomycin (VANCOCIN) 1,250 mg in sodium chloride 0 9 % 250 mL IVPB (0 mg/kg × 86 4 kg Intravenous Stopped 3/6/22 2013)   iohexol (OMNIPAQUE) 350 MG/ML injection (SINGLE-DOSE) 100 mL (100 mL Intravenous Given 3/6/22 1819)   metoprolol (LOPRESSOR) injection 5 mg (5 mg Intravenous Given 3/6/22 1837)   metoprolol (LOPRESSOR) injection 5 mg (5 mg Intravenous Given 3/6/22 2008)   LORazepam (ATIVAN) injection 0 5 mg (0 5 mg Intravenous Given 3/6/22 2216)   HYDROmorphone (DILAUDID) injection 1 mg (1 mg Intravenous Given 3/6/22 2326)   promethazine (PHENERGAN) injection 25 mg (25 mg Intravenous Given 3/6/22 2325)   HYDROmorphone (DILAUDID) injection 2 mg (2 mg Intravenous Given 3/7/22 3946)   famotidine (PEPCID) injection 20 mg (20 mg Intravenous Given 3/7/22 1743)   diltiazem (CARDIZEM) injection 15 mg (15 mg Intravenous Given 3/7/22 1805)   LORazepam (ATIVAN) injection 1 mg (1 mg Intravenous Given 3/7/22 1956)       Diagnostic Studies  Results Reviewed     Procedure Component Value Units Date/Time    Blood culture #1 [[id number]] Collected: 03/06/22 1555    Lab Status: Preliminary result Specimen: Blood from Arm, Left Updated: 03/08/22 0101     Blood Culture No Growth at 24 hrs  Blood culture #2 [[id number]] Collected: 03/06/22 1555    Lab Status: Preliminary result Specimen: Blood from Arm, Right Updated: 03/08/22 0101     Blood Culture No Growth at 24 hrs      Procalcitonin Reflex [[id number]]  (Normal) Collected: 03/07/22 0513    Lab Status: Final result Specimen: Blood from Central Venous Line Updated: 03/07/22 0543     Procalcitonin <0 05 ng/ml     Basic metabolic panel [[id number]] Collected: 03/07/22 0512    Lab Status: Final result Specimen: Blood from Central Venous Line Updated: 03/07/22 0527     Sodium 138 mmol/L      Potassium 4 2 mmol/L      Chloride 103 mmol/L      CO2 27 mmol/L      ANION GAP 8 mmol/L      BUN 6 mg/dL      Creatinine 1 05 mg/dL      Glucose 96 mg/dL      Glucose, Fasting 96 mg/dL      Calcium 8 5 mg/dL      eGFR 96 ml/min/1 73sq m     Narrative:      [place] guidelines for Chronic Kidney Disease (CKD):     Stage 1 with normal or high GFR (GFR > 90 mL/min/1 73 square meters)    Stage 2 Mild CKD (GFR = 60-89 mL/min/1 73 square meters)    Stage 3A Moderate CKD (GFR = 45-59 mL/min/1 73 square meters)    Stage 3B Moderate CKD (GFR = 30-44 mL/min/1 73 square meters)    Stage 4 Severe CKD (GFR = 15-29 mL/min/1 73 square meters)    Stage 5 End Stage CKD (GFR <15 mL/min/1 73 square meters)  Note: GFR calculation is accurate only with a steady state creatinine    CBC and differential [[id number]]  (Abnormal) Collected: 03/07/22 0512    Lab Status: Final result Specimen: Blood from Central Venous Line Updated: 03/07/22 0518     WBC 15 98 Thousand/uL      RBC 5 72 Million/uL      Hemoglobin 10 9 g/dL      Hematocrit 37 0 %      MCV 65 fL      MCH 19 1 pg      MCHC 29 5 g/dL      RDW 18 4 %      MPV 8 1 fL      Platelets 996 Thousands/uL      nRBC 0 /100 WBCs      Neutrophils Relative 78 %      Immat GRANS % 0 %      Lymphocytes Relative 15 %      Monocytes Relative 6 %      Eosinophils Relative 0 %      Basophils Relative 1 %      Neutrophils Absolute 12 52 Thousands/µL      Immature Grans Absolute 0 07 Thousand/uL      Lymphocytes Absolute 2 31 Thousands/µL      Monocytes Absolute 0 92 Thousand/µL      Eosinophils Absolute 0 03 Thousand/µL      Basophils Absolute 0 13 Thousands/µL     HS Troponin 0hr (reflex protocol) [[id number]]  (Normal) Collected: 03/06/22 2020    Lab Status: Final result Specimen: Blood from Arm, Right Updated: 03/06/22 2052     hs TnI 0hr 10 ng/L     UA w Reflex to Microscopic w Reflex to Culture [[id number]] Collected: 03/06/22 2017    Lab Status: Final result Specimen: Urine, Clean Catch Updated: 03/06/22 2028     Color, UA Yellow     Clarity, UA Clear     Specific [place], UA 1 020     pH, UA 6 0     Leukocytes, UA Negative     Nitrite, UA Negative     Protein, UA Negative mg/dl      Glucose, UA Negative mg/dl      Ketones, UA Negative mg/dl      Urobilinogen, UA 0 2 E U /dl      Bilirubin, UA Negative     Blood, UA Negative    Magnesium [[id number]]  (Normal) Collected: 03/06/22 1555    Lab Status: Final result Specimen: Blood from Arm, Left Updated: 03/06/22 1903     Magnesium 2 1 mg/dL     Lipase [[id number]]  (Abnormal) Collected: 03/06/22 1555    Lab Status: Final result Specimen: Blood from Arm, Left Updated: 03/06/22 1700     Lipase 72 u/L     Procalcitonin with AM Reflex [[id number]]  (Normal) Collected: 03/06/22 1555    Lab Status: Final result Specimen: Blood from Arm, Left Updated: 03/06/22 1632     Procalcitonin <0 05 ng/ml     Lactic acid [[id number]]  (Normal) Collected: 03/06/22 1555    Lab Status: Final result Specimen: Blood from Arm, Left Updated: 03/06/22 1626     LACTIC ACID 1 9 mmol/L     Narrative:      Result may be elevated if tourniquet was used during collection      Comprehensive metabolic panel [[id number]] (Abnormal) Collected: 03/06/22 1555    Lab Status: Final result Specimen: Blood from Arm, Left Updated: 03/06/22 1621     Sodium 141 mmol/L      Potassium 3 2 mmol/L      Chloride 105 mmol/L      CO2 24 mmol/L      ANION GAP 12 mmol/L      BUN 7 mg/dL      Creatinine 0 79 mg/dL      Glucose 95 mg/dL      Calcium 8 7 mg/dL      AST 26 U/L      ALT 32 U/L      Alkaline Phosphatase 81 U/L      Total Protein 7 3 g/dL      Albumin 3 5 g/dL      Total Bilirubin 0 31 mg/dL      eGFR 122 ml/min/1 73sq m     Narrative:      [employer] guidelines for Chronic Kidney Disease (CKD):     Stage 1 with normal or high GFR (GFR > 90 mL/min/1 73 square meters)    Stage 2 Mild CKD (GFR = 60-89 mL/min/1 73 square meters)    Stage 3A Moderate CKD (GFR = 45-59 mL/min/1 73 square meters)    Stage 3B Moderate CKD (GFR = 30-44 mL/min/1 73 square meters)    Stage 4 Severe CKD (GFR = 15-29 mL/min/1 73 square meters)    Stage 5 End Stage CKD (GFR <15 mL/min/1 73 square meters)  Note: GFR calculation is accurate only with a steady state creatinine    Protime-INR [[id number]]  (Normal) Collected: 03/06/22 1555    Lab Status: Final result Specimen: Blood from Arm, Left Updated: 03/06/22 1620     Protime 12 4 seconds      INR 0 94    APTT [[id number]]  (Normal) Collected: 03/06/22 1555    Lab Status: Final result Specimen: Blood from Arm, Left Updated: 03/06/22 1620     PTT 30 seconds     CBC and differential [[id number]]  (Abnormal) Collected: 03/06/22 1555    Lab Status: Final result Specimen: Blood from Arm, Left Updated: 03/06/22 1604     WBC 11 06 Thousand/uL      RBC 6 59 Million/uL      Hemoglobin 12 3 g/dL      Hematocrit 42 5 %      MCV 65 fL      MCH 18 7 pg      MCHC 28 9 g/dL      RDW 19 1 %      MPV 8 7 fL      Platelets 588 Thousands/uL      nRBC 0 /100 WBCs      Neutrophils Relative 71 %      Immat GRANS % 0 %      Lymphocytes Relative 16 %      Monocytes Relative 10 %      Eosinophils Relative 2 % Basophils Relative 1 %      Neutrophils Absolute 7 77 Thousands/µL      Immature Grans Absolute 0 04 Thousand/uL      Lymphocytes Absolute 1 80 Thousands/µL      Monocytes Absolute 1 14 Thousand/µL      Eosinophils Absolute 0 17 Thousand/µL      Basophils Absolute 0 14 Thousands/µL                  CT pe study w abdomen pelvis w contrast   Final Result by Alejandro Simpson MD (03/06 2139)      Right lower lobe centrilobular nodular changes concerning for an infectious or inflammatory process (2:)  Patient is post colectomy  There is mural thickening of the rectum and distal small bowel concerning for enteritis/proctitis/pouchitis         There is a 1 3 cm lymph node in the right iliac chain (3:64)  This is similar to the prior exam          Workstation performed: [id number]         XR chest 1 view portable   Final Result by Ariadne Piper MD (03/07 0740)      No acute cardiopulmonary disease  Moderate gaseous distention of the stomach              Workstation performed: [id number]                    Procedures  CriticalCare Time  Performed by: Erin Mesa MD  Authorized by: Erin Mesa MD     Critical care provider statement:     Critical care time (minutes):  76    Critical care was necessary to treat or prevent imminent or life-threatening deterioration of the following conditions:  Shock and cardiac failure    Critical care was time spent personally by me on the following activities:  Blood draw for specimens, interpretation of cardiac output measurements, development of treatment plan with patient or surrogate, obtaining history from patient or surrogate, ordering and performing treatments and interventions, ordering and review of laboratory studies, re-evaluation of patient's condition, discussions with consultants, evaluation of patient's response to treatment, review of old charts and examination of patient             ED Course                               SBIRT 20yo+ Most Recent Value   SBIRT (22 yo +)    In order to provide better care to our patients, we are screening all of our patients for alcohol and drug use  Would it be okay to ask you these screening questions? No Filed at: 03/06/2022 1275                    [facility]  Number of Diagnoses or Management Options  History of endocarditis  Intractable abdominal pain  SVT (supraventricular tachycardia) ([facility] )  Diagnosis management comments: Initial presentation suspicious for drug rash without appearance of urticaria, no wheezing, no oropharyngeal swelling however patient remaining tachycardic and going into apparent SVT with rate of 160-170  Quality of EKG during episodes dramatically diminished by patient's tremor however no appreciable p wave on EKG or monitor  Initial hypotension into the 80s with response to multiple crystalloid fluid boluses  Sepsis workup grossly unremarkable and patient remaining afebrile  Treated with adenosine 6 mg, 12 mg, 12 mg without improvement, cardizem bolus with subsequent conversion to sinus rhythm  Patient with several recurrent episodes of palpitations and apparent recurrent SVT on monitor  Evaluated by critical care team and determined appropriate for medicine floor  Patient admitted for further evaluation and treatment  Patient, father, and mother all updated on treatment plan         Disposition  Final diagnoses:   SVT (supraventricular tachycardia) ([facility] )   History of endocarditis   Intractable abdominal pain     Time reflects when diagnosis was documented in both MDM as applicable and the Disposition within this note     Time User Action Codes Description Comment    3/6/2022  6:30 PM Ava Antigo Add [I47 1] SVT (supraventricular tachycardia) ([facility] )     3/6/2022  6:40 PM Ava Antigo Add [Z86 79] History of endocarditis     3/6/2022  6:40 PM Ava Antigo Add [R10 9] Intractable abdominal pain     3/7/2022  9:05 AM Jitendra Christine Add [R78 81] Positive blood cultures       ED Disposition     ED Disposition Condition Date/Time Comment    Admit Stable Sun Mar 6, 2022  6:39 PM Case was discussed with CALE and the patient's admission status was agreed to be Admission Status: inpatient status to the service of Dr Vinnie Pearl   Follow-up Information     Follow up With Specialties Details Why Contact Info    [facility]  Follow up  [phone]          Current Discharge Medication List      CONTINUE these medications which have NOT CHANGED    Details   ceFAZolin (ANCEF) 2000 mg IVPB Infuse 2,000 mg into a venous catheter every 8 (eight) hours for 20 days  Refills: 0    Associated Diagnoses: Positive blood cultures      DULoxetine (CYMBALTA) 30 mg delayed release capsule daily      ondansetron (ZOFRAN) 4 mg tablet Take 1 tablet (4 mg total) by mouth every 8 (eight) hours as needed for nausea or vomiting  Qty: 20 tablet, Refills: 0    Associated Diagnoses: Chronic ulcerative pancolitis (HCC)      Tofacitinib Citrate ER (Xeljanz XR) 11 MG TB24 Take by mouth daily      hydrocortisone (CORTENEMA) 100 mg/60 mL enema Insert 60 mL (100 mg total) into the rectum daily at bedtime for 7 days  Qty: 7 enema, Refills: 0    Associated Diagnoses: Ulcerative colitis (HCC)      ondansetron (Zofran ODT) 4 mg disintegrating tablet Take 1 tablet (4 mg total) by mouth every 6 (six) hours as needed for nausea or vomiting  Qty: 20 tablet, Refills: 0    Associated Diagnoses: Abdominal pain; Nausea and vomiting; History of Crohn's disease      oxyCODONE (Roxicodone) 5 immediate release tablet Take 0 5 tablets (2 5 mg total) by mouth every 6 (six) hours as needed for severe pain for up to 5 days Max Daily Amount: 10 mg  Qty: 10 tablet, Refills: 0    Associated Diagnoses: Chronic ulcerative pancolitis ([address] )             No discharge procedures on file      PDMP Review       Value Time User    PDMP Reviewed  Yes 3/3/2022  2:02  [facility],           ED Provider  Electronically Signed by           Leela Miguel Viral Macdonald MD  03/08/22 2311

## 2022-03-08 NOTE — ASSESSMENT & PLAN NOTE
Noted to be in SVT upon arrival to ED,S/p adenosine x3 and cardizem infusion   Seen by Cardiology, input appreciated  Noted to have sinus tachycardia, low suspicion of reentry SVT  Likely secondary to allergic reaction    Recommended observation  · No further episodes  · Monitor on telemetry   · Supportive care

## 2022-03-08 NOTE — PROGRESS NOTES
Patient requesting IVF - he is concerned that he gets dehydrated when he has an infection  Reviewed notes, no contraindications - 0 9 NS ordered @ 100 cc/hr     Also requesting antibiotics to be started  After discussion, he decided to hold off and wait for ID to evaluate him in morning  I discussed with him that since he is hemodynamically & clinically stable, it is not ideal but okay to hold off any antibiotics at this time because patient had reaction to both cefazolin and vancomycin which has been discontinued as well  It is not certain that he will not get another allergic reaction  He has missed prior 5 days of antibiotics, he did get some vancomycin prior to reaction  Patient is in agreement due to risk vs benefit  Discussed with RN

## 2022-03-08 NOTE — PLAN OF CARE
Problem: Potential for Falls  Goal: Patient will remain free of falls  Description: INTERVENTIONS:  - Educate patient/family on patient safety including physical limitations  - Instruct patient to call for assistance with activity   - Consult OT/PT to assist with strengthening/mobility   - Keep Call bell within reach  - Keep bed low and locked with side rails adjusted as appropriate  - Keep care items and personal belongings within reach  - Initiate and maintain comfort rounds  - Make Fall Risk Sign visible to staff  - Offer Toileting every 2 Hours, in advance of need  - Initiate/Maintain bed alarm  - Obtain necessary fall risk management equipment  Problem: Knowledge Deficit  Goal: Patient/family/caregiver demonstrates understanding of disease process, treatment plan, medications, and discharge instructions  Description: Complete learning assessment and assess knowledge base    Interventions:  - Provide teaching at level of understanding  - Provide teaching via preferred learning methods  Outcome: Progressing     - Apply yellow socks and bracelet for high fall risk patients  - Consider moving patient to room near nurses station  Outcome: Progressing     Problem: PAIN - ADULT  Goal: Verbalizes/displays adequate comfort level or baseline comfort level  Description: Interventions:  - Encourage patient to monitor pain and request assistance  - Assess pain using appropriate pain scale  - Administer analgesics based on type and severity of pain and evaluate response  - Implement non-pharmacological measures as appropriate and evaluate response  - Consider cultural and social influences on pain and pain management  - Notify physician/advanced practitioner if interventions unsuccessful or patient reports new pain  Outcome: Progressing

## 2022-03-08 NOTE — PLAN OF CARE
Problem: Potential for Falls  Goal: Patient will remain free of falls  Description: INTERVENTIONS:  - Educate patient/family on patient safety including physical limitations  - Instruct patient to call for assistance with activity   - Consult OT/PT to assist with strengthening/mobility   - Keep Call bell within reach  - Keep bed low and locked with side rails adjusted as appropriate  - Keep care items and personal belongings within reach  - Initiate and maintain comfort rounds  - Make Fall Risk Sign visible to staff  - Offer Toileting every 2 Hours, in advance of need  - Initiate/Maintain bed/ chair alarm  - Obtain necessary fall risk management equipment: bed/ chair alarm  - Apply yellow socks and bracelet for high fall risk patients  - Consider moving patient to room near nurses station  Outcome: Progressing     Problem: PAIN - ADULT  Goal: Verbalizes/displays adequate comfort level or baseline comfort level  Description: Interventions:  - Encourage patient to monitor pain and request assistance  - Assess pain using appropriate pain scale  - Administer analgesics based on type and severity of pain and evaluate response  - Implement non-pharmacological measures as appropriate and evaluate response  - Consider cultural and social influences on pain and pain management  - Notify physician/advanced practitioner if interventions unsuccessful or patient reports new pain  Outcome: Progressing     Problem: INFECTION - ADULT  Goal: Absence or prevention of progression during hospitalization  Description: INTERVENTIONS:  - Assess and monitor for signs and symptoms of infection  - Monitor lab/diagnostic results  - Monitor all insertion sites, i e  indwelling lines, tubes, and drains  - Monitor endotracheal if appropriate and nasal secretions for changes in amount and color  - [place] appropriate cooling/warming therapies per order  - Administer medications as ordered  - Instruct and encourage patient and family to use good hand hygiene technique  - Identify and instruct in appropriate isolation precautions for identified infection/condition  Outcome: Progressing  Goal: Absence of fever/infection during neutropenic period  Description: INTERVENTIONS:  - Monitor WBC    Outcome: Progressing     Problem: SAFETY ADULT  Goal: Patient will remain free of falls  Description: INTERVENTIONS:  - Educate patient/family on patient safety including physical limitations  - Instruct patient to call for assistance with activity   - Consult OT/PT to assist with strengthening/mobility   - Keep Call bell within reach  - Keep bed low and locked with side rails adjusted as appropriate  - Keep care items and personal belongings within reach  - Initiate and maintain comfort rounds  - Make Fall Risk Sign visible to staff  - Offer Toileting every 2 Hours, in advance of need  - Initiate/Maintain bed/ chair alarm  - Obtain necessary fall risk management equipment: bed/ chair alarm  - Apply yellow socks and bracelet for high fall risk patients  - Consider moving patient to room near nurses station  Outcome: Progressing  Goal: Maintain or return to baseline ADL function  Description: INTERVENTIONS:  -  Assess patient's ability to carry out ADLs; assess patient's baseline for ADL function and identify physical deficits which impact ability to perform ADLs (bathing, care of mouth/teeth, toileting, grooming, dressing, etc )  - Assess/evaluate cause of self-care deficits   - Assess range of motion  - Assess patient's mobility; develop plan if impaired  - Assess patient's need for assistive devices and provide as appropriate  - Encourage maximum independence but intervene and supervise when necessary  - Involve family in performance of ADLs  - Assess for home care needs following discharge   - Consider OT consult to assist with ADL evaluation and planning for discharge  - Provide patient education as appropriate  Outcome: Progressing  Goal: Maintains/Returns to pre admission functional level  Description: INTERVENTIONS:  - Perform BMAT or MOVE assessment daily    - Set and communicate daily mobility goal to care team and patient/family/caregiver  - Collaborate with rehabilitation services on mobility goals if consulted  - Perform Range of Motion 3 times a day  - Reposition patient every 2 hours  - Dangle patient 3 times a day  - Stand patient 3 times a day  - Ambulate patient 3 times a day  - Out of bed to chair 3 times a day   - Out of bed for meals 3 times a day  - Out of bed for toileting  - Record patient progress and toleration of activity level   Outcome: Progressing     Problem: DISCHARGE PLANNING  Goal: Discharge to home or other facility with appropriate resources  Description: INTERVENTIONS:  - Identify barriers to discharge w/patient and caregiver  - Arrange for needed discharge resources and transportation as appropriate  - Identify discharge learning needs (meds, wound care, etc )  - Arrange for interpretive services to assist at discharge as needed  - Refer to Case Management Department for coordinating discharge planning if the patient needs post-hospital services based on physician/advanced practitioner order or complex needs related to functional status, cognitive ability, or social support system  Outcome: Progressing     Problem: Knowledge Deficit  Goal: Patient/family/caregiver demonstrates understanding of disease process, treatment plan, medications, and discharge instructions  Description: Complete learning assessment and assess knowledge base    Interventions:  - Provide teaching at level of understanding  - Provide teaching via preferred learning methods  Outcome: Progressing

## 2022-03-08 NOTE — PROGRESS NOTES
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 Possibly infusion related  Patient had received vancomycin at the time of admission and during prior hospitalization without any intolerance  · Antibiotics were changed to IV vancomycin  · Continue IV antibiotics, plan for slow infusion  · Plan to continue 4 weeks through 3/23  · Monitor symptoms    * Tachycardia  Assessment & Plan  Noted to be in SVT upon arrival to ED,S/p adenosine x3 and cardizem infusion   Seen by Cardiology, input appreciated  Noted to have sinus tachycardia, low suspicion of reentry SVT  Likely secondary to allergic reaction  Recommended observation  Monitor on telemetry   Supportive care    Hives  Assessment & Plan  Likely secondary to cefazolin    Now resolved  · Monitor symptoms    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 endocarditis  Assessment & Plan  Underwent 2D echo and BUCKY during recent hospitalization in setting of alpha staph bacteremia  No evidence of definitive endocarditis but noted possible vegetation versus redundant chordae at base of AV  · Continue IV vancomycin    [facility] ([facility] )  Assessment & Plan  S/p pouchoscopy along with TTS balloon dilatation on previous admission   Biopsy with evidence of chronic active entritis  Negative for malignancy  Continue hydrocortisone enemas and pain regimen  Follow-up with primary GI after discharge    Enteritis  Assessment & Plan  History of UC status post colectomy and ileal pouch formation  Continue Xeljanz and hydrocortisone  GI follow-up discharge    Leukocytosis  Assessment & Plan  Likely secondary to steroids  Monitor    Hypokalemia  Assessment & Plan  Repleted, improved    Coagulase negative Staphylococcus bacteremia  Assessment & Plan  Noted during recent hospitalization, likely contaminant  Repeat culture has been negative          VTE Pharmacologic Prophylaxis:   Low Risk (Score 0-2) - Encourage Ambulation  Patient Centered Rounds: I performed bedside rounds with nursing staff today  Discussions with Specialists or Other Care Team Provider:  Infectious disease, Cardiology  Discussed with Dr Lopez Hicks who updated patient's primary cardiologist regarding cardiology treatment plan    Education and Discussions with Family / Patient: Patient declined call to   Time Spent for Care: 45 minutes  More than 50% of total time spent on counseling and coordination of care as described above  Current Length of Stay: 0 day(s)  Current Patient Status: Observation   Certification Statement: The patient will continue to require additional hospital stay due to monitor tolerance of antibiotics  Discharge Plan: Anticipate discharge in 24-48 hrs to home  Code Status: Level 1 - Full Code    Subjective:   Felt hot and associated redness with IV vancomycin infusion yesterday, noted to have associated tachycardia which eventually improved after receiving IV metoprolol and Cardizem  Denies chest pain shortness of breath  Feels anxious    Reports mild nausea, tolerating diet  Ongoing loose bowel movement which is at baseline    Denies any bleeding    Discussed regarding limiting opiates which may be contributing to some of his GI symptoms, patient feels that oxycodone does causing constipation but denies Dilaudid having same effect    Objective:     Vitals:   Temp (24hrs), Av 7 °F (36 5 °C), Min:97 5 °F (36 4 °C), Max:97 8 °F (36 6 °C)    Temp:  [97 5 °F (36 4 °C)-97 8 °F (36 6 °C)] 97 5 °F (36 4 °C)  HR:  [] 98  Resp:  [17-20] 20  BP: (113-147)/(62-96) 135/92  SpO2:  [94 %-97 %] 95 % on room air  Body mass index is 28 13 kg/m²  Input and Output Summary (last 24 hours):   No intake or output data in the 24 hours ending 22 1502    Physical Exam:   Physical Exam  Constitutional:       General: He is not in acute distress  HENT:      Head: Normocephalic and atraumatic  Cardiovascular:      Rate and Rhythm: Normal rate  Pulmonary:      Effort: Pulmonary effort is normal  No respiratory distress  Breath sounds: No wheezing, rhonchi or rales  Chest:      Chest wall: No tenderness  Abdominal:      General: Bowel sounds are normal  There is no distension  Palpations: Abdomen is soft  Tenderness: There is no abdominal tenderness  There is no guarding or rebound  Musculoskeletal:      Right lower leg: No edema  Left lower leg: No edema  Skin:     General: Skin is warm and dry  Findings: No rash  Neurological:      Mental Status: He is alert  Mental status is at baseline  Cranial Nerves: No cranial nerve deficit           Additional Data:     Labs:  Results from last 7 days   Lab Units 22  0412   WBC Thousand/uL 18 05*   HEMOGLOBIN g/dL 10 5*   HEMATOCRIT % 36 1*   PLATELETS Thousands/uL 518*   NEUTROS PCT % 87*   LYMPHS PCT % 8*   MONOS PCT % 5   EOS PCT % 0     Results from last 7 days   Lab Units 22  0412 22  0512 22  1555   SODIUM mmol/L 137   < > 141   POTASSIUM mmol/L 4 2   < > 3 2*   CHLORIDE mmol/L 102   < > 105   CO2 mmol/L 27   < > 24   BUN mg/dL 7   < > 7   CREATININE mg/dL 0 96   < > 0 79   ANION GAP mmol/L 8   < > 12   CALCIUM mg/dL 8 3   < > 8 7   ALBUMIN g/dL  --   --  3 5   TOTAL BILIRUBIN mg/dL  --   --  0 31   ALK PHOS U/L  --   --  81   ALT U/L  --   --  32   AST U/L  --   --  26   GLUCOSE RANDOM mg/dL 117   < > 95    < > = values in this interval not displayed  Results from last 7 days   Lab Units 03/06/22  1555   INR  0 94             Results from last 7 days   Lab Units 03/07/22  0513 03/06/22  1555   LACTIC ACID mmol/L  --  1 9   PROCALCITONIN ng/ml <0 05 <0 05       Lines/Drains:  Invasive Devices  Report    Peripherally Inserted Central Catheter Line            PICC Line 03/01/22 Right Brachial 7 days          Peripheral Intravenous Line            Peripheral IV 03/06/22 Left Arm 1 day                Central Line:  Goal for removal: N/A - Discharging with PICC for IV ABX/medications         Telemetry:  Telemetry Orders (From admission, onward)             48 Hour Telemetry Monitoring  Continuous x 48 hours        References:    Telemetry Guidelines   Question:  Reason for 48 Hour Telemetry  Answer:  Arrhythmias Requiring Medical Therapy (eg  SVT, Vtach/fib, Bradycardia, Uncontrolled A-fib)                 Telemetry Reviewed: Normal Sinus Rhythm episode of sinus tachycardia last evening  Indication for Continued Telemetry Use: Arrthymias requiring medical therapy             Imaging: Reviewed radiology reports from this admission including: chest CT scan    Recent Cultures (last 7 days):   Results from last 7 days   Lab Units 03/06/22  1555   BLOOD CULTURE  No Growth at 24 hrs  No Growth at 24 hrs         Last 24 Hours Medication List:   Current Facility-Administered Medications   Medication Dose Route Frequency Provider Last Rate    acetaminophen  650 mg Oral Q6H PRN Jitendra Solis, DO      ALPRAZolam  0 25 mg Oral BID PRN Jitendra Solis, DO      aluminum-magnesium hydroxide-simethicone  30 mL Oral Q4H PRN Darlin Revankar, DO      diphenhydrAMINE  25 mg Intravenous Q6H PRN Jitendra Solis, DO      docusate sodium  100 mg Oral BID Bassam Orona MD      DULoxetine  30 mg Oral Daily Jitendra Solis, DO      famotidine  20 mg Intravenous Q12H 1101 Kathi Perez MD      hydrocortisone  100 mg Rectal HS Jitendra Solis, DO      HYDROmorphone  1 mg Intravenous Q3H PRN Jitendra Solis, DO      metoprolol  5 mg Intravenous Q6H PRN Jitendra Solis, DO      ondansetron  4 mg Intravenous Q6H PRN Earlene Quiroz PA-C      oxyCODONE  2 5 mg Oral Q6H PRN Earlene Quiroz PA-C      promethazine  25 mg Intravenous Q6H PRN Bassam Orona MD      sodium chloride  100 mL/hr Intravenous Continuous Kiko Lee  mL/hr (03/08/22 0915)        Today, Patient Was Seen By: Bassam Orona MD    ** Please Note: "This note has been constructed using a voice recognition system  Therefore there may be syntax, spelling, and/or grammatical errors   Please call if you have any questions  "**

## 2022-03-08 NOTE — PROGRESS NOTES
Progress Note - Infectious Disease   Tc Collins 27 y o  male MRN: [id number]  Unit/Bed#: [address] [facility] Encounter: [id number]      Impression/Recommendations:  1  Polymicrobial alpha streptococcal bacteremia, felt to be secondary to translocation from gut  Patient is clinically much improved  Bacteremia had cleared prior to recent discharge  Continue IV antibiotic but change to IV vancomycin, as in below  Treat x4 weeks total (antibiotic # 12), through 3/23, as previously planned      2  Presumptive AV endocarditis  2D echo and BUCKY were not definitive but showed possible vegetation at base of AV  No evidence of septic emboli  Antibiotic plan as in above      3  Recent coagulase-negative Staphylococcus bacteremia, felt to be secondary to contaminated blood draw  No antibiotic or further workup needed for this      4  Hives, most likely secondary to cefazolin  Hives have resolved  Will complete antibiotic course with IV vancomycin  Vancomycin was held due to concern for allergic reaction  However, patient did receive a dose of IV vancomycin on admission 2 nights ago, without problem  Restart IV vancomycin  Monitor for recurrent hives or rash      5  Recent enteritis  Patient is clinically improved, although he still has mild RUQ abdominal pain  Monitor abdominal pain      6  UC, status post colectomy        7  Ankylosing spondylitis, on outpatient Hiwot Alvarez      Recent hospitalization and ER notes reviewed in detail  Discussed with patient in detail regarding the above plan  Antibiotics:  Vancomycin  Antibiotic # 13     Subjective:  Patient with stable lower abdominal and rectal pain  No further hives  No palpitations  Temperature stays down  No chills      Objective:  Vitals:  Temp:  [97 5 °F (36 4 °C)-97 8 °F (36 6 °C)] 97 5 °F (36 4 °C)  HR:  [] 98  Resp:  [17-20] 20  BP: (113-147)/(62-96) 135/92  SpO2:  [94 %-97 %] 95 %  Temp (24hrs), Av 7 °F (36 5 °C), Min:97 5 °F (36 4 °C), Max:97 8 °F (36 6 °C)  Current: Temperature: 97 5 °F (36 4 °C)    Physical Exam:     General: Awake, alert, cooperative, no distress  Neck:  Supple  No mass  No lymphadenopathy  Lungs: Expansion symmetric, no rales, no wheezing, respirations unlabored  Heart:  Regular rate and rhythm, S1 and S2 normal, no murmur  Abdomen: Soft, nondistended, stable mild to moderate RLQ tenderness, bowel sounds active all four quadrants, no masses, no organomegaly  Extremities: No edema  No erythema/warmth  No ulcer  Nontender to palpation  Skin:  No rash  Neuro: Moves all extremities  Invasive Devices  Report    Peripherally Inserted Central Catheter Line            PICC Line 03/01/22 Right Brachial 7 days          Peripheral Intravenous Line            Peripheral IV 03/06/22 Left Arm 1 day                Labs studies:   I have personally reviewed pertinent labs  Results from last 7 days   Lab Units 03/08/22  0412 03/07/22  0512 03/06/22  1555   POTASSIUM mmol/L 4 2 4 2 3 2*   CHLORIDE mmol/L 102 103 105   CO2 mmol/L 27 27 24   BUN mg/dL 7 6 7   CREATININE mg/dL 0 96 1 05 0 79   EGFR ml/min/1 73sq m 107 96 122   CALCIUM mg/dL 8 3 8 5 8 7   AST U/L  --   --  26   ALT U/L  --   --  32   ALK PHOS U/L  --   --  81     Results from last 7 days   Lab Units 03/08/22  0412 03/07/22  0512 03/06/22  1555   WBC Thousand/uL 18 05* 15 98* 11 06*   HEMOGLOBIN g/dL 10 5* 10 9* 12 3   PLATELETS Thousands/uL 518* 509* 612*     Results from last 7 days   Lab Units 03/06/22  1555   BLOOD CULTURE  No Growth at 24 hrs  No Growth at 24 hrs  Imaging Studies:   I have personally reviewed pertinent imaging study reports and images in PACS  EKG, Pathology, and Other Studies:   I have personally reviewed pertinent reports

## 2022-03-08 NOTE — QUICK NOTE
Patient and his parents had some reservation regarding restarting vancomycin  They were concerned that vancomycin was etiology of tachycardia  I doubt that vancomycin is etiology of tachycardia, although its infusion rate could be the culprit  Patient had a few days of vancomycin during recent admission and he also had a dose of vancomycin on presentation to ER 2 nights ago without problem  After extensive discussion with them, patient and parents are agreeable to restart vancomycin, at a slower infusion rate  We will monitor him closely for recurrent tachycardia  Discussed with Dr Tessie Mandujano from [facility] service  Discussed with RN  Discussed with Pharmacy

## 2022-03-08 NOTE — TELEPHONE ENCOUNTER
Pt mother called wanting to discuss pt therapy plan  Did not see that she was listed on communication consent form  She wanted to discuss care with provider then and stated that pt father who is on communication form is next to her   Asked for provider to call her at [phone] Tiffany Mccarty and Larry Barboza

## 2022-03-08 NOTE — ASSESSMENT & PLAN NOTE
Noted during recent hospitalization  Deemed to be secondary to GI translocation   Repeat cultures were negative  Patient was subsequently discharged on outpatient IV cefazolin but noted to have hives, presented back to the hospital  Seen by ID input appreciated  Antibiotics were changed to vancomycin but Vancomycin was held on 03/07 with concern of an allergic reaction? Possibly infusion related  Patient had received vancomycin at the time of admission and during prior hospitalization without any intolerance  Restarted IV vancomycin , initially slow infusion which was gradually increased    Patient is tolerating without any intolerance since 03/08  · Continue IV vancomycin  · Plan to continue 4 weeks through 3/24  · Follow-up with ID after discharge as scheduled  · Follow-up labs, CBC, creatinine and vancomycin level while on antibiotics

## 2022-03-08 NOTE — ASSESSMENT & PLAN NOTE
S/p pouchoscopy along with TTS balloon dilatation on previous admission   Biopsy with evidence of chronic active entritis  Negative for malignancy  Symptoms are improving, tolerating diet  Less pain  · GI following, input appreciated  Hydrocortisone enema discontinued  Metronidazole added   · Continue metronidazole for 2 weeks, continue Bentyl, probiotic  · Continue symptomatic treatment  Discussed about limiting opiates, patient does not want to continue opiates on discharge  Reports that overall pain is better    · Follow-up with GI and [facility] after discharge

## 2022-03-08 NOTE — PROGRESS NOTES
Janna Severs Dr Noemi Gerold pt's HR in 170's  Vancomycin stopped  Pt appears red and hot  No fever BP in 140's  Dr Ninfa Cummins put in order for metoprolol 5mg and 25 mg of benadryl  Pt's HR went down into 130's  Told Dr Ninfa Cummins She ordered Pepcid and Cardizem 15mg  Gave medication  pts' hr came down into 80's- 90's BP remained in the 140's  Pt starting to recover

## 2022-03-08 NOTE — PROGRESS NOTES
Vancomycin Assessment    Felipa Lopez is a 27 y o  male who is currently receiving vancomycin 1250 mg IV q8h for bacteremia     Relevant clinical data and objective history reviewed:  Creatinine   Date Value Ref Range Status   03/08/2022 0 96 0 60 - 1 30 mg/dL Final     Comment:     Standardized to IDMS reference method   03/07/2022 1 05 0 60 - 1 30 mg/dL Final     Comment:     Standardized to IDMS reference method   03/06/2022 0 79 0 60 - 1 30 mg/dL Final     Comment:     Standardized to IDMS reference method   10/31/2015 0 9 0 6 - 1 3 mg/dL    10/30/2015 1 0 0 6 - 1 3 mg/dL      /96   Pulse 102   Temp 97 7 °F (36 5 °C)   Resp 18   Ht 5' 9" (1 753 m)   Wt 86 4 kg (190 lb 7 6 oz)   SpO2 99%   BMI 28 13 kg/m²   I/O last 3 completed shifts: In: 400 [IV Piggyback:400]  Out: 300 [Urine:300]  Lab Results   Component Value Date/Time    BUN 7 03/08/2022 04:12 AM    BUN 7 10/26/2018 07:34 AM    WBC 18 05 (H) 03/08/2022 04:12 AM    WBC 9 0 10/31/2015 06:00 AM    HGB 10 5 (L) 03/08/2022 04:12 AM    HGB 10 5 (L) 10/31/2015 06:00 AM    HCT 36 1 (L) 03/08/2022 04:12 AM    HCT 34 2 (L) 10/31/2015 06:00 AM    MCV 65 (L) 03/08/2022 04:12 AM    MCV 61 3 (L) 10/31/2015 06:00 AM     (H) 03/08/2022 04:12 AM     10/31/2015 06:00 AM     Temp Readings from Last 3 Encounters:   03/08/22 97 7 °F (36 5 °C)   03/03/22 (!) 97 3 °F (36 3 °C)   02/21/22 97 9 °F (36 6 °C) (Oral)     Vancomycin Days of Therapy: 1    Assessment/Plan  The patient is currently on vancomycin utilizing scheduled dosing based on actual body weight  The patient is currently receiving 1250 mg IV q8h and is clinically appropriate and dose will be continued  Pharmacy will also follow closely for s/sx of nephrotoxicity, infusion reactions, and appropriateness of therapy  BMP and CBC will be ordered per protocol  Plan for trough as patient approaches steady state, prior to the 4th  dose at approximately 1630 on 3/9/22    Due to infection severity, will target a trough of 15-20 (appropriate for most indications)   Pharmacy will continue to follow the patients culture results and clinical progress daily      Maritza Body, Pharmacist

## 2022-03-09 ENCOUNTER — APPOINTMENT (OUTPATIENT)
Dept: RADIOLOGY | Facility: HOSPITAL | Age: 27
DRG: 393 | End: 2022-03-09
Payer: COMMERCIAL

## 2022-03-09 PROBLEM — L50.9 HIVES: Status: RESOLVED | Noted: 2022-03-08 | Resolved: 2022-03-09

## 2022-03-09 LAB
ANION GAP SERPL CALCULATED.3IONS-SCNC: 7 MMOL/L (ref 4–13)
BASOPHILS # BLD AUTO: 0.12 THOUSANDS/ΜL (ref 0–0.1)
BASOPHILS NFR BLD AUTO: 1 % (ref 0–1)
BUN SERPL-MCNC: 7 MG/DL (ref 5–25)
CALCIUM SERPL-MCNC: 8.1 MG/DL (ref 8.3–10.1)
CHLORIDE SERPL-SCNC: 106 MMOL/L (ref 100–108)
CO2 SERPL-SCNC: 27 MMOL/L (ref 21–32)
CREAT SERPL-MCNC: 0.88 MG/DL (ref 0.6–1.3)
EOSINOPHIL # BLD AUTO: 0.01 THOUSAND/ΜL (ref 0–0.61)
EOSINOPHIL NFR BLD AUTO: 0 % (ref 0–6)
ERYTHROCYTE [DISTWIDTH] IN BLOOD BY AUTOMATED COUNT: 17.7 % (ref 11.6–15.1)
GFR SERPL CREATININE-BSD FRML MDRD: 116 ML/MIN/1.73SQ M
GLUCOSE SERPL-MCNC: 101 MG/DL (ref 65–140)
HCT VFR BLD AUTO: 35.4 % (ref 36.5–49.3)
HGB BLD-MCNC: 9.9 G/DL (ref 12–17)
IMM GRANULOCYTES # BLD AUTO: 0.07 THOUSAND/UL (ref 0–0.2)
IMM GRANULOCYTES NFR BLD AUTO: 1 % (ref 0–2)
LYMPHOCYTES # BLD AUTO: 1.13 THOUSANDS/ΜL (ref 0.6–4.47)
LYMPHOCYTES NFR BLD AUTO: 8 % (ref 14–44)
MCH RBC QN AUTO: 18.3 PG (ref 26.8–34.3)
MCHC RBC AUTO-ENTMCNC: 28 G/DL (ref 31.4–37.4)
MCV RBC AUTO: 65 FL (ref 82–98)
MONOCYTES # BLD AUTO: 0.55 THOUSAND/ΜL (ref 0.17–1.22)
MONOCYTES NFR BLD AUTO: 4 % (ref 4–12)
NEUTROPHILS # BLD AUTO: 11.7 THOUSANDS/ΜL (ref 1.85–7.62)
NEUTS SEG NFR BLD AUTO: 86 % (ref 43–75)
NRBC BLD AUTO-RTO: 0 /100 WBCS
PLATELET # BLD AUTO: 484 THOUSANDS/UL (ref 149–390)
PMV BLD AUTO: 8.8 FL (ref 8.9–12.7)
POTASSIUM SERPL-SCNC: 4.4 MMOL/L (ref 3.5–5.3)
RBC # BLD AUTO: 5.41 MILLION/UL (ref 3.88–5.62)
SODIUM SERPL-SCNC: 140 MMOL/L (ref 136–145)
VANCOMYCIN TROUGH SERPL-MCNC: 14.1 UG/ML (ref 10–20)
WBC # BLD AUTO: 13.58 THOUSAND/UL (ref 4.31–10.16)

## 2022-03-09 PROCEDURE — 99232 SBSQ HOSP IP/OBS MODERATE 35: CPT | Performed by: INTERNAL MEDICINE

## 2022-03-09 PROCEDURE — 99214 OFFICE O/P EST MOD 30 MIN: CPT | Performed by: INTERNAL MEDICINE

## 2022-03-09 PROCEDURE — 87209 SMEAR COMPLEX STAIN: CPT | Performed by: NURSE PRACTITIONER

## 2022-03-09 PROCEDURE — 87177 OVA AND PARASITES SMEARS: CPT | Performed by: NURSE PRACTITIONER

## 2022-03-09 PROCEDURE — 80048 BASIC METABOLIC PNL TOTAL CA: CPT | Performed by: INTERNAL MEDICINE

## 2022-03-09 PROCEDURE — 85025 COMPLETE CBC W/AUTO DIFF WBC: CPT | Performed by: INTERNAL MEDICINE

## 2022-03-09 PROCEDURE — 80202 ASSAY OF VANCOMYCIN: CPT | Performed by: INTERNAL MEDICINE

## 2022-03-09 PROCEDURE — 87505 NFCT AGENT DETECTION GI: CPT | Performed by: NURSE PRACTITIONER

## 2022-03-09 PROCEDURE — 74019 RADEX ABDOMEN 2 VIEWS: CPT

## 2022-03-09 RX ORDER — HYDROMORPHONE HCL/PF 1 MG/ML
1 SYRINGE (ML) INJECTION
Status: DISCONTINUED | OUTPATIENT
Start: 2022-03-09 | End: 2022-03-09

## 2022-03-09 RX ORDER — METRONIDAZOLE 500 MG/1
500 TABLET ORAL EVERY 12 HOURS SCHEDULED
Status: DISCONTINUED | OUTPATIENT
Start: 2022-03-09 | End: 2022-03-12 | Stop reason: HOSPADM

## 2022-03-09 RX ORDER — HYDROMORPHONE HCL/PF 1 MG/ML
1 SYRINGE (ML) INJECTION ONCE
Status: COMPLETED | OUTPATIENT
Start: 2022-03-09 | End: 2022-03-09

## 2022-03-09 RX ORDER — KETOROLAC TROMETHAMINE 30 MG/ML
15 INJECTION, SOLUTION INTRAMUSCULAR; INTRAVENOUS ONCE
Status: COMPLETED | OUTPATIENT
Start: 2022-03-09 | End: 2022-03-09

## 2022-03-09 RX ADMIN — HYDROMORPHONE HYDROCHLORIDE 1 MG: 1 INJECTION, SOLUTION INTRAMUSCULAR; INTRAVENOUS; SUBCUTANEOUS at 18:13

## 2022-03-09 RX ADMIN — PROMETHAZINE HYDROCHLORIDE 12.5 MG: 25 INJECTION INTRAMUSCULAR; INTRAVENOUS at 13:08

## 2022-03-09 RX ADMIN — HYDROMORPHONE HYDROCHLORIDE 1 MG: 1 INJECTION, SOLUTION INTRAMUSCULAR; INTRAVENOUS; SUBCUTANEOUS at 15:12

## 2022-03-09 RX ADMIN — FAMOTIDINE 20 MG: 10 INJECTION INTRAVENOUS at 20:25

## 2022-03-09 RX ADMIN — HYDROMORPHONE HYDROCHLORIDE 1 MG: 1 INJECTION, SOLUTION INTRAMUSCULAR; INTRAVENOUS; SUBCUTANEOUS at 05:43

## 2022-03-09 RX ADMIN — VANCOMYCIN HYDROCHLORIDE 1250 MG: 1 INJECTION, POWDER, LYOPHILIZED, FOR SOLUTION INTRAVENOUS at 08:59

## 2022-03-09 RX ADMIN — VANCOMYCIN HYDROCHLORIDE 1750 MG: 10 INJECTION, POWDER, LYOPHILIZED, FOR SOLUTION INTRAVENOUS at 18:06

## 2022-03-09 RX ADMIN — FAMOTIDINE 20 MG: 10 INJECTION INTRAVENOUS at 08:59

## 2022-03-09 RX ADMIN — HYDROMORPHONE HYDROCHLORIDE 1 MG: 1 INJECTION, SOLUTION INTRAMUSCULAR; INTRAVENOUS; SUBCUTANEOUS at 08:59

## 2022-03-09 RX ADMIN — HYDROMORPHONE HYDROCHLORIDE 1 MG: 1 INJECTION, SOLUTION INTRAMUSCULAR; INTRAVENOUS; SUBCUTANEOUS at 20:25

## 2022-03-09 RX ADMIN — HYDROMORPHONE HYDROCHLORIDE 1 MG: 1 INJECTION, SOLUTION INTRAMUSCULAR; INTRAVENOUS; SUBCUTANEOUS at 23:37

## 2022-03-09 RX ADMIN — HYDROMORPHONE HYDROCHLORIDE 1 MG: 1 INJECTION, SOLUTION INTRAMUSCULAR; INTRAVENOUS; SUBCUTANEOUS at 12:17

## 2022-03-09 RX ADMIN — ALUMINA, MAGNESIA, AND SIMETHICONE ORAL SUSPENSION REGULAR STRENGTH 30 ML: 1200; 1200; 120 SUSPENSION ORAL at 18:47

## 2022-03-09 RX ADMIN — PROMETHAZINE HYDROCHLORIDE 12.5 MG: 25 INJECTION INTRAMUSCULAR; INTRAVENOUS at 20:25

## 2022-03-09 RX ADMIN — VANCOMYCIN HYDROCHLORIDE 1250 MG: 1 INJECTION, POWDER, LYOPHILIZED, FOR SOLUTION INTRAVENOUS at 00:25

## 2022-03-09 RX ADMIN — DULOXETINE HYDROCHLORIDE 30 MG: 30 CAPSULE, DELAYED RELEASE ORAL at 08:59

## 2022-03-09 RX ADMIN — METRONIDAZOLE 500 MG: 500 TABLET ORAL at 20:44

## 2022-03-09 RX ADMIN — KETOROLAC TROMETHAMINE 15 MG: 30 INJECTION, SOLUTION INTRAMUSCULAR at 19:11

## 2022-03-09 RX ADMIN — HYDROMORPHONE HYDROCHLORIDE 1 MG: 1 INJECTION, SOLUTION INTRAMUSCULAR; INTRAVENOUS; SUBCUTANEOUS at 00:21

## 2022-03-09 NOTE — PLAN OF CARE
Problem: Potential for Falls  Goal: Patient will remain free of falls  Description: INTERVENTIONS:  - Educate patient/family on patient safety including physical limitations  - Instruct patient to call for assistance with activity   - Consult OT/PT to assist with strengthening/mobility   - Keep Call bell within reach  - Keep bed low and locked with side rails adjusted as appropriate  - Keep care items and personal belongings within reach  - Initiate and maintain comfort rounds  - Make Fall Risk Sign visible to staff  - Offer Toileting every 2 Hours, in advance of need  - Initiate/Maintain bed/chair alarm  - Obtain necessary fall risk management equipment: bed/chair alarm  - Apply yellow socks and bracelet for high fall risk patients  - Consider moving patient to room near nurses station  Outcome: Progressing     Problem: PAIN - ADULT  Goal: Verbalizes/displays adequate comfort level or baseline comfort level  Description: Interventions:  - Encourage patient to monitor pain and request assistance  - Assess pain using appropriate pain scale  - Administer analgesics based on type and severity of pain and evaluate response  - Implement non-pharmacological measures as appropriate and evaluate response  - Consider cultural and social influences on pain and pain management  - Notify physician/advanced practitioner if interventions unsuccessful or patient reports new pain  Outcome: Progressing     Problem: INFECTION - ADULT  Goal: Absence or prevention of progression during hospitalization  Description: INTERVENTIONS:  - Assess and monitor for signs and symptoms of infection  - Monitor lab/diagnostic results  - Monitor all insertion sites, i e  indwelling lines, tubes, and drains  - Monitor endotracheal if appropriate and nasal secretions for changes in amount and color  - [place] appropriate cooling/warming therapies per order  - Administer medications as ordered  - Instruct and encourage patient and family to use good hand hygiene technique  - Identify and instruct in appropriate isolation precautions for identified infection/condition  Outcome: Progressing  Goal: Absence of fever/infection during neutropenic period  Description: INTERVENTIONS:  - Monitor WBC    Outcome: Progressing     Problem: SAFETY ADULT  Goal: Patient will remain free of falls  Description: INTERVENTIONS:  - Educate patient/family on patient safety including physical limitations  - Instruct patient to call for assistance with activity   - Consult OT/PT to assist with strengthening/mobility   - Keep Call bell within reach  - Keep bed low and locked with side rails adjusted as appropriate  - Keep care items and personal belongings within reach  - Initiate and maintain comfort rounds  - Make Fall Risk Sign visible to staff  - Offer Toileting every 2 Hours, in advance of need  - Initiate/Maintain bed/chair alarm  - Obtain necessary fall risk management equipment: bed/chair alarm  - Apply yellow socks and bracelet for high fall risk patients  - Consider moving patient to room near nurses station  Outcome: Progressing  Goal: Maintain or return to baseline ADL function  Description: INTERVENTIONS:  -  Assess patient's ability to carry out ADLs; assess patient's baseline for ADL function and identify physical deficits which impact ability to perform ADLs (bathing, care of mouth/teeth, toileting, grooming, dressing, etc )  - Assess/evaluate cause of self-care deficits   - Assess range of motion  - Assess patient's mobility; develop plan if impaired  - Assess patient's need for assistive devices and provide as appropriate  - Encourage maximum independence but intervene and supervise when necessary  - Involve family in performance of ADLs  - Assess for home care needs following discharge   - Consider OT consult to assist with ADL evaluation and planning for discharge  - Provide patient education as appropriate  Outcome: Progressing  Goal: Maintains/Returns to pre admission functional level  Description: INTERVENTIONS:  - Perform BMAT or MOVE assessment daily    - Set and communicate daily mobility goal to care team and patient/family/caregiver  - Collaborate with rehabilitation services on mobility goals if consulted  - Perform Range of Motion 3 times a day  - Reposition patient every 2 hours  - Dangle patient 3 times a day  - Stand patient 3 times a day  - Ambulate patient 3 times a day  - Out of bed to chair 3 times a day   - Out of bed for meals 3 times a day  - Out of bed for toileting  - Record patient progress and toleration of activity level   Outcome: Progressing     Problem: DISCHARGE PLANNING  Goal: Discharge to home or other facility with appropriate resources  Description: INTERVENTIONS:  - Identify barriers to discharge w/patient and caregiver  - Arrange for needed discharge resources and transportation as appropriate  - Identify discharge learning needs (meds, wound care, etc )  - Arrange for interpretive services to assist at discharge as needed  - Refer to Case Management Department for coordinating discharge planning if the patient needs post-hospital services based on physician/advanced practitioner order or complex needs related to functional status, cognitive ability, or social support system  Outcome: Progressing     Problem: Knowledge Deficit  Goal: Patient/family/caregiver demonstrates understanding of disease process, treatment plan, medications, and discharge instructions  Description: Complete learning assessment and assess knowledge base    Interventions:  - Provide teaching at level of understanding  - Provide teaching via preferred learning methods  Outcome: Progressing

## 2022-03-09 NOTE — PLAN OF CARE
Problem: PAIN - ADULT  Goal: Verbalizes/displays adequate comfort level or baseline comfort level  Description: Interventions:  - Encourage patient to monitor pain and request assistance  - Assess pain using appropriate pain scale  - Administer analgesics based on type and severity of pain and evaluate response  - Implement non-pharmacological measures as appropriate and evaluate response  - Consider cultural and social influences on pain and pain management  - Notify physician/advanced practitioner if interventions unsuccessful or patient reports new pain  Outcome: Progressing     Problem: INFECTION - ADULT  Goal: Absence of fever/infection during neutropenic period  Description: INTERVENTIONS:  - Monitor WBC    Outcome: Progressing     Problem: INFECTION - ADULT  Goal: Absence or prevention of progression during hospitalization  Description: INTERVENTIONS:  - Assess and monitor for signs and symptoms of infection  - Monitor lab/diagnostic results  - Monitor all insertion sites, i e  indwelling lines, tubes, and drains  - Monitor endotracheal if appropriate and nasal secretions for changes in amount and color  - [place] appropriate cooling/warming therapies per order  - Administer medications as ordered  - Instruct and encourage patient and family to use good hand hygiene technique  - Identify and instruct in appropriate isolation precautions for identified infection/condition  Outcome: Progressing     Problem: DISCHARGE PLANNING  Goal: Discharge to home or other facility with appropriate resources  Description: INTERVENTIONS:  - Identify barriers to discharge w/patient and caregiver  - Arrange for needed discharge resources and transportation as appropriate  - Identify discharge learning needs (meds, wound care, etc )  - Arrange for interpretive services to assist at discharge as needed  - Refer to Case Management Department for coordinating discharge planning if the patient needs post-hospital services based on physician/advanced practitioner order or complex needs related to functional status, cognitive ability, or social support system  Outcome: Progressing     Problem: Knowledge Deficit  Goal: Patient/family/caregiver demonstrates understanding of disease process, treatment plan, medications, and discharge instructions  Description: Complete learning assessment and assess knowledge base    Interventions:  - Provide teaching at level of understanding  - Provide teaching via preferred learning methods  Outcome: Progressing

## 2022-03-09 NOTE — ASSESSMENT & PLAN NOTE
Underwent 2D echo and BUCKY during recent hospitalization in setting of alpha staph bacteremia  No evidence of definitive endocarditis but noted possible vegetation versus redundant chordae at base of AV  · Continue IV vancomycin

## 2022-03-09 NOTE — ASSESSMENT & PLAN NOTE
History of UC status post colectomy and ileal pouch formation  GI evaluation appreciated  Follow-up recommendation  Symptoms are improving, diarrhea now at baseline, tolerating diet  Abdominal exam benign  · Repeat  stool studies , stool bacterial PCR and stool ova and parasite-negative  Stool C diff and stool doses were negative in prior admission  CRP is low  · Flagyl added, continue b i d   For 2 weeks  · Continue Bentyl, probiotic  · Symptomatic treatment  · GI follow-up after discharge as scheduled

## 2022-03-09 NOTE — PROGRESS NOTES
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 Possibly infusion related  Patient had received vancomycin at the time of admission and during prior hospitalization without any intolerance  Restarted IV vancomycin slow infusion which tolerated since 03/08  · Continue IV vancomycin, discussed with ID plan to increase infusion rate slowly  · Monitor for any reaction  · Plan to continue 4 weeks through 3/23  · Monitor symptoms    * Tachycardia  Assessment & Plan  Noted to be in SVT upon arrival to ED,S/p adenosine x3 and cardizem infusion   Seen by Cardiology, input appreciated  Noted to have sinus tachycardia, low suspicion of reentry SVT  Likely secondary to allergic reaction    Recommended observation  · No further episodes  · Monitor on telemetry   · Supportive care    Presumed AV endocarditis  Assessment & Plan  Underwent 2D echo and BUCKY during recent hospitalization in setting of alpha staph bacteremia  No evidence of definitive endocarditis but noted possible vegetation versus redundant chordae at base of AV  · Continue IV vancomycin    P[facility]itis ([facility] )  Assessment & Plan  S/p pouchoscopy along with TTS balloon dilatation on previous admission   Biopsy with evidence of chronic active entritis  Negative for malignancy  Still with ongoing symptoms of lower abdominal discomfort and frequent bowel movement  Appetite poor with frequent bowel movements  · Continue current management, symptomatic treatment  · Will consult GI for further recommendation due to persistent symptoms without significant improvement    Enteritis  Assessment & Plan  History of UC status post colectomy and ileal pouch formation  GI evaluation    Leukocytosis  Assessment & Plan  Likely secondary to steroids  Downtrending  Monitor    Hives-resolved as of 3/9/2022  Assessment & Plan  Likely secondary to cefazolin  Now resolved  · Monitor symptoms    Hypokalemia  Assessment & Plan  Repleted, improved    Anemia  Assessment & Plan  Chronic  Monitor    Coagulase negative Staphylococcus bacteremia  Assessment & Plan  Noted during recent hospitalization, likely contaminant  Repeat culture has been negative          VTE Pharmacologic Prophylaxis:   Low Risk (Score 0-2) - Encourage Ambulation  Patient Centered Rounds: I performed bedside rounds with nursing staff today  Discussions with Specialists or Other Care Team Provider:  Infectious disease, gastroenterology    Education and Discussions with Family / Patient: Patient declined call to   Time Spent for Care: 45 minutes  More than 50% of total time spent on counseling and coordination of care as described above  Current Length of Stay: 0 day(s)  Current Patient Status: Observation   Certification Statement: The patient will continue to require additional hospital stay due to monitor tolerance of antibiotics  Discharge Plan: Anticipate discharge in 24-48 hrs to home  Code Status: Level 1 - Full Code    Subjective:    Tolerated vancomycin since yesterday without any reaction  Still with frequent loose bowel movement up to 15 times a day mainly in small quantity with cramping lower abdominal discomfort denies bleeding but have noticed small amount of blood on wiping at times  Reports associated nausea, p o  Intake is fair    Does not report significant improvement after starting hydrocortisone enema    Denies chest pain or shortness of breath  Afebrile    Objective:     Vitals:   Temp (24hrs), Av 7 °F (36 5 °C), Min:97 6 °F (36 4 °C), Max:97 7 °F (36 5 °C)    Temp:  [97 6 °F (36 4 °C)-97 7 °F (36 5 °C)] 97 6 °F (36 4 °C)  HR:  [] 67  Resp:  [17-18] 17  BP: (129-146)/() 146/103  SpO2:  [94 %-99 %] 97 % on room air  Body mass index is 28 13 kg/m²  Input and Output Summary (last 24 hours): Intake/Output Summary (Last 24 hours) at 3/9/2022 1352  Last data filed at 3/9/2022 0025  Gross per 24 hour   Intake 2846 67 ml   Output --   Net 2846 67 ml       Physical Exam:   Physical Exam  Constitutional:       General: He is not in acute distress  HENT:      Head: Normocephalic and atraumatic  Cardiovascular:      Rate and Rhythm: Normal rate  Pulmonary:      Effort: Pulmonary effort is normal  No respiratory distress  Breath sounds: No wheezing, rhonchi or rales  Chest:      Chest wall: No tenderness  Abdominal:      General: Bowel sounds are normal  There is no distension  Palpations: Abdomen is soft  Tenderness: There is abdominal tenderness (Minimal lower abdominal)  There is no guarding or rebound  Musculoskeletal:      Right lower leg: No edema  Left lower leg: No edema  Skin:     General: Skin is warm and dry  Findings: No rash  Neurological:      Mental Status: He is alert  Mental status is at baseline  Cranial Nerves: No cranial nerve deficit           Additional Data:     Labs:  Results from last 7 days   Lab Units 22  0542   WBC Thousand/uL 13 58*   HEMOGLOBIN g/dL 9 9*   HEMATOCRIT % 35 4*   PLATELETS Thousands/uL 484*   NEUTROS PCT % 86*   LYMPHS PCT % 8*   MONOS PCT % 4   EOS PCT % 0     Results from last 7 days   Lab Units 03/09/22  0542 03/07/22  0512 03/06/22  1555   SODIUM mmol/L 140   < > 141   POTASSIUM mmol/L 4 4   < > 3 2*   CHLORIDE mmol/L 106   < > 105   CO2 mmol/L 27   < > 24   BUN mg/dL 7   < > 7   CREATININE mg/dL 0 88   < > 0 79   ANION GAP mmol/L 7   < > 12   CALCIUM mg/dL 8 1*   < > 8 7   ALBUMIN g/dL  --   --  3 5   TOTAL BILIRUBIN mg/dL  --   --  0 31   ALK PHOS U/L  --   --  81   ALT U/L  --   --  32   AST U/L  --   --  26   GLUCOSE RANDOM mg/dL 101   < > 95    < > = values in this interval not displayed  Results from last 7 days   Lab Units 03/06/22  1555   INR  0 94             Results from last 7 days   Lab Units 03/07/22  0513 03/06/22  1555   LACTIC ACID mmol/L  --  1 9   PROCALCITONIN ng/ml <0 05 <0 05       Lines/Drains:  Invasive Devices  Report    Peripherally Inserted Central Catheter Line            PICC Line 03/01/22 Right Brachial 8 days          Peripheral Intravenous Line            Peripheral IV 03/06/22 Left Arm 2 days                Central Line:  Goal for removal: N/A - Discharging with PICC for IV ABX/medications         Telemetry:  Telemetry Orders (From admission, onward)             48 Hour Telemetry Monitoring  Continuous x 48 hours        References:    Telemetry Guidelines   Question:  Reason for 48 Hour Telemetry  Answer:  Arrhythmias Requiring Medical Therapy (eg  SVT, Vtach/fib, Bradycardia, Uncontrolled A-fib)                 Telemetry Reviewed: Normal Sinus Rhythm no further episodes of sinus tachycardia  Indication for Continued Telemetry Use: Arrthymias requiring medical therapy             Imaging: Reviewed radiology reports from this admission including: chest CT scan    Recent Cultures (last 7 days):   Results from last 7 days   Lab Units 03/06/22  1555   BLOOD CULTURE  No Growth at 48 hrs  No Growth at 48 hrs         Last 24 Hours Medication List:   Current Facility-Administered Medications   Medication Dose Route Frequency Provider Last Rate    acetaminophen  650 mg Oral Q6H PRN Agape L Irma, DO      ALPRAZolam  0 25 mg Oral BID PRN Agape L Irma, DO      aluminum-magnesium hydroxide-simethicone  30 mL Oral Q4H PRN Darlin Revankar, DO      diphenhydrAMINE  25 mg Intravenous Q6H PRN Agape L Irma, DO      docusate sodium  100 mg Oral BID PRN Janes Miller MD      DULoxetine  30 mg Oral Daily Jitendra Solis, DO      famotidine  20 mg Intravenous Q12H Dada Grewal MD      hydrocortisone  100 mg Rectal HS Jitendra Solis, DO      HYDROmorphone  1 mg Intravenous Q3H PRN Jitendra Solis, DO      metoprolol  5 mg Intravenous Q6H PRN Jitendra Solis, DO      promethazine  12 5 mg Intravenous Q6H PRN Janes Miller MD      sodium chloride  100 mL/hr Intravenous Continuous Torrey Herzog  mL/hr (03/08/22 2019)    vancomycin  15 mg/kg Intravenous Q8H Manish Tavera MD 1,250 mg (03/09/22 0859)        Today, Patient Was Seen By: Janes Miller MD    ** Please Note: "This note has been constructed using a voice recognition system  Therefore there may be syntax, spelling, and/or grammatical errors   Please call if you have any questions  "**

## 2022-03-09 NOTE — QUICK NOTE
Quick note  03/09/2022 0920    Patient's telemetry reviewed, no further sinus tachycardia noted overnight      [facility]  Cardiology

## 2022-03-09 NOTE — CONSULTS
Physician Requesting Consult: Janes Miller MD    Reason for Consult / Principal Problem:  Pouchitis      Assessment/Plan:    1  Pouchitis  Presuming his initial diagnosis of ulcerative colitis was correct, the inflammatory changes noted on endoscopic pouch os copy likely represent acute pouchitis  This would typically be treated with a course of antibiotics such as ciprofloxacin and/or metronidazole for several weeks  He reports that he has been treated with this combination in the past for prior episode of pouchitis in that his symptoms currently are similar  Patient currently on vancomycin for treatment of streptococcal bacteremia  Would recommend adding metronidazole 500 mg p o  B i d  For 14 days  Discontinue hydrocortisone enemas  Other infectious etiologies including Clostridium difficile are also possible, and further evaluation with stool studies is recommended  Less likely, this could represent Crohn's disease rather than ulcerative colitis  If symptoms are not improving in the next several weeks, would recommend outpatient MR enterography to evaluate for more proximal small bowel inflammatory changes  HPI: Gary Bansal is a 27 y o  male  with complicated history of ulcerative colitis, status post total colectomy and J pouch ileoanal anastomosis in 2015 with subsequent chronic stricture, history of prior Clostridium difficile, ankylosing spondylitis on tofacitinib with chronic diarrhea 5-7 times per day recently started on cefazolin for treatment of bacteremia, now presents with rash and tachycardia  Reports for the past 1-2 weeks he has been experiencing lower abdominal crampy pain as well as increased frequency of nonbloody liquid stool a proximally 15 times per day  No fever chills, jaundice or rash, vomiting, arthralgias, aphthous ulcers  Does feel nauseated    Recently underwent endoscopic pouchoscopy on March 1st with evidence of erythema, edema and multiple small ulcerations throughout the pouch as well as proximal stricture which was dilated to 20 mm with a through the scope balloon  Recently started hydrocortisone enemas within the past week but has had no noted improvement thus far  Allergies:    Allergies   Allergen Reactions    Mesalamine GI Intolerance     Other reaction(s): Pancreatitis  Annotation - [id number]: pancreatitis    Cefazolin Hives       Medications:  Current Facility-Administered Medications:     acetaminophen (TYLENOL) tablet 650 mg, 650 mg, Oral, Q6H PRN, Jitendra Solis DO    ALPRAZolam Dorthey Halim) tablet 0 25 mg, 0 25 mg, Oral, BID PRN, Jitendra Solis DO, 0 25 mg at 03/08/22 0915    aluminum-magnesium hydroxide-simethicone (MYLANTA) oral suspension 30 mL, 30 mL, Oral, Q4H PRN, Darlin Revankar, DO, 30 mL at 03/06/22 2325    diphenhydrAMINE (BENADRYL) injection 25 mg, 25 mg, Intravenous, Q6H PRN, Jitendra Solis DO, 25 mg at 03/08/22 1636    docusate sodium (COLACE) capsule 100 mg, 100 mg, Oral, BID PRN, Ghanshyam East MD    DULoxetine (CYMBALTA) delayed release capsule 30 mg, 30 mg, Oral, Daily, Jitendra Solis DO, 30 mg at 03/09/22 0859    famotidine (PEPCID) injection 20 mg, 20 mg, Intravenous, Q12H [facility], Ghanshyam East MD, 20 mg at 03/09/22 0859    HYDROmorphone (DILAUDID) injection 1 mg, 1 mg, Intravenous, Q3H PRN, Jitendra Solis DO, 1 mg at 03/09/22 1512    metoprolol (LOPRESSOR) injection 5 mg, 5 mg, Intravenous, Q6H PRN, Jitendra Solis DO, 5 mg at 03/07/22 1719    metroNIDAZOLE (FLAGYL) tablet 500 mg, 500 mg, Oral, Q12H [facility], [place]    promethazine (PHENERGAN) injection 12 5 mg, 12 5 mg, Intravenous, Q6H PRN, Ghanshyam East MD, 12 5 mg at 03/09/22 1308    sodium chloride 0 9 % infusion, 75 mL/hr, Intravenous, Continuous, Ghanshyam East MD, Last Rate: 75 mL/hr at 03/09/22 1512, 75 mL/hr at 03/09/22 1512    vancomycin (VANCOCIN) 1,250 mg in sodium chloride 0 9 % 250 mL IVPB, 15 mg/kg, Intravenous, Q8H, Ana Paula Miner MD, Last Rate: 100 mL/hr at 03/09/22 0859, 1,250 mg at 03/09/22 0859    Past Medical history:  Past Medical History:   Diagnosis Date    Ankylosing spondylitis ([facility] )     Anxiety     Bowel obstruction ([facility] )     Clostridium difficile colitis 9/13/2018    Colitis     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[facility] ) 9/13/2018    Pancreatitis     Ulcerative colitis ([facility] )        Past Surgical History:   Past Surgical History:   Procedure Laterality Date    COLECTOMY TOTAL      with ileal pouch and anastemosis    IR PICC PLACEMENT SINGLE LUMEN  3/1/2022    TOTAL COLECTOMY         Social history:   Social History     Tobacco Use    Smoking status: Never Smoker    Smokeless tobacco: Never Used   Vaping Use    Vaping Use: Never used   Substance Use Topics    Alcohol use: Yes     Comment: pt states 5 a month/socially    Drug use: No       Family history: History reviewed  No pertinent family history  Review of Systems: Review of Systems   Constitutional: Positive for activity change and fatigue  Negative for chills, diaphoresis and fever  Eyes: Negative  Respiratory: Negative  Cardiovascular: Positive for palpitations  Gastrointestinal: Positive for abdominal pain, diarrhea, nausea and rectal pain  Negative for anal bleeding, blood in stool and constipation  Genitourinary: Negative  Skin: Positive for rash  Neurological: Negative  Hematological: Negative  Psychiatric/Behavioral: Negative  Physical Exam: Physical Exam  Vitals and nursing note reviewed  Constitutional:       General: He is not in acute distress  Appearance: He is not toxic-appearing  HENT:      Head: Normocephalic and atraumatic  Nose: Nose normal       Mouth/Throat:      Mouth: Mucous membranes are moist       Pharynx: Oropharynx is clear  Eyes:      General: No scleral icterus  Pupils: Pupils are equal, round, and reactive to light  Cardiovascular:      Rate and Rhythm: Normal rate     Pulmonary:      Effort: Pulmonary effort is normal  Breath sounds: Normal breath sounds  Abdominal:      General: Abdomen is flat  Bowel sounds are normal       Tenderness: There is abdominal tenderness  There is no guarding or rebound  Musculoskeletal:      Cervical back: Normal range of motion and neck supple  Right lower leg: No edema  Left lower leg: No edema  Skin:     General: Skin is warm and dry  Coloration: Skin is not jaundiced  Neurological:      General: No focal deficit present  Mental Status: He is alert and oriented to person, place, and time     Psychiatric:         Mood and Affect: Mood normal          Behavior: Behavior normal            Lab Results:   Recent Results (from the past 24 hour(s))   CBC and differential    Collection Time: 03/09/22  5:42 AM   Result Value Ref Range    WBC 13 58 (H) 4 31 - 10 16 Thousand/uL    RBC 5 41 3 88 - 5 62 Million/uL    Hemoglobin 9 9 (L) 12 0 - 17 0 g/dL    Hematocrit 35 4 (L) 36 5 - 49 3 %    MCV 65 (L) 82 - 98 fL    MCH 18 3 (L) 26 8 - 34 3 pg    MCHC 28 0 (L) 31 4 - 37 4 g/dL    RDW 17 7 (H) 11 6 - 15 1 %    MPV 8 8 (L) 8 9 - 12 7 fL    Platelets 767 (H) 861 - 390 Thousands/uL    nRBC 0 /100 WBCs    Neutrophils Relative 86 (H) 43 - 75 %    Immat GRANS % 1 0 - 2 %    Lymphocytes Relative 8 (L) 14 - 44 %    Monocytes Relative 4 4 - 12 %    Eosinophils Relative 0 0 - 6 %    Basophils Relative 1 0 - 1 %    Neutrophils Absolute 11 70 (H) 1 85 - 7 62 Thousands/µL    Immature Grans Absolute 0 07 0 00 - 0 20 Thousand/uL    Lymphocytes Absolute 1 13 0 60 - 4 47 Thousands/µL    Monocytes Absolute 0 55 0 17 - 1 22 Thousand/µL    Eosinophils Absolute 0 01 0 00 - 0 61 Thousand/µL    Basophils Absolute 0 12 (H) 0 00 - 0 10 Thousands/µL   Basic metabolic panel    Collection Time: 03/09/22  5:42 AM   Result Value Ref Range    Sodium 140 136 - 145 mmol/L    Potassium 4 4 3 5 - 5 3 mmol/L    Chloride 106 100 - 108 mmol/L    CO2 27 21 - 32 mmol/L    ANION GAP 7 4 - 13 mmol/L    BUN 7 5 - 25 mg/dL Creatinine 0 88 0 60 - 1 30 mg/dL    Glucose 101 65 - 140 mg/dL    Calcium 8 1 (L) 8 3 - 10 1 mg/dL    eGFR 116 ml/min/1 73sq m           Imaging Studies: Colonoscopy    Result Date: 3/1/2022  Narrative: Meena Alcantar  Operating Room [address] [place] [address] [phone] DATE OF SERVICE: 3/01/22 PHYSICIAN(S): Nigel Ricks MD Proceduralist Procedure :  Pouchoscopy along with TTS balloon dilatation with multiple biopsies INDICATION: Stricture intestinal ([address] ) POST-OP DIAGNOSIS: See the impression below  HISTORY: Prior colonoscopy: Less than 3 years ago  It is being repeated at an interval of less than 3 years because: This colonoscopy is being performed for a diagnostic indication BOWEL PREPARATION: Miralax/Magnesium Citrate; Enema PREPROCEDURE: Informed consent was obtained for the procedure, including sedation  Risks including but not limited to bleeding, infection, perforation, adverse drug reaction and aspiration were explained in detail  Also explained about less than 100% sensitivity with the exam and other alternatives  The patient was placed in the left lateral decubitus position  DETAILS OF PROCEDURE: Patient was taken to the procedure room where a time out was performed to confirm correct patient and correct procedure  The patient underwent monitored anesthesia care, which was administered by an anesthesia professional  The patient's blood pressure, heart rate, level of consciousness, oxygen and respirations were monitored throughout the procedure  A digital rectal exam was performed  A perianal exam was performed  The scope was introduced through the anus and advanced to the terminal ileum  Retroflexion was performed in the rectum  The quality of bowel preparation was evaluated using the [facility] Bowel Preparation Scale with scores of: right colon = not assessed, transverse colon = not assessed, left colon = 3  The total BBPS score was 3  Bowel prep was adequate   The patient experienced no blood loss  The procedure was not difficult  The patient tolerated the procedure well  There were no apparent complications  ANESTHESIA INFORMATION: ASA: II Anesthesia Type: IV Sedation with Anesthesia MEDICATIONS: No administrations occurring from 1401 to 1440 on 03/01/22 FINDINGS: History of severe ulcerative colitis, status post proctocolectomy, status post J-pouch  Pouchoscopy examination shows stricture with slight narrowing at ileo anal anastomotic site  Liquid stool in the pouch which was suction it out  There is evidence of pouchitis with multiple small ulceration throughout in the ileum and NJ pouch, multiple biopsies were done  With the use of TTS balloon 18, 19 and 20 mm size balloon, stricture site was dilated Moderate, localized edematous, erythematous and ulcerated mucosa in the terminal ileum; performed cold forceps biopsy EVENTS: Procedure Events Event Event Time ENDO CECUM REACHED 3/1/2022  2:28 PM ENDO SCOPE OUT TIME 3/1/2022  2:39 PM SPECIMENS: ID Type Source Tests Collected by Time Destination 1 : Terminal Ileum Bx Tissue Terminal Ileum TISSUE EXAM Sheeba Blanton MD 3/1/2022  2:24 PM  EQUIPMENT: Colonoscope -     Impression: 1  Evidence of pouchitis with multiple small ulceration and narrowing at ileo anal  anastomotic site, status post TTS balloon dilatation RECOMMENDATION: Await pathology results Hydrocortisone enema Resume diet  Sheeba Blanton MD     XR chest 1 view portable    Result Date: 3/7/2022  Narrative: CHEST INDICATION:   history of endocarditis, tachycardia  COMPARISON:  05/24/2021 EXAM PERFORMED/VIEWS:  XR CHEST PORTABLE FINDINGS: Cardiomediastinal silhouette appears unremarkable  The lungs are clear  No pneumothorax or pleural effusion  Osseous structures appear within normal limits for patient age  Moderate gaseous distention of the stomach  Impression: No acute cardiopulmonary disease  Moderate gaseous distention of the stomach  Workstation performed: [id number]     XR abdomen obstruction series    Result Date: 2/28/2022  Narrative: OBSTRUCTION SERIES INDICATION:   Abdominal distension, nausea  History of colectomy  COMPARISON:  CTs dated 2/23/2022 and 2/21/2022  EXAM PERFORMED/VIEWS:  XR ABDOMEN OBSTRUCTION SERIES FINDINGS: Lung volumes are normal   No focal consolidation  No effusion  No pneumothorax  Cardiomediastinal silhouette is normal   Normal pulmonary vasculature  Central gas-filled loops of small bowel status post colectomy and ileoanal anastomosis  Ileal pouch pouch measures up to 8 6 cm in caliber  This is unchanged from priors without progressive distention  There are a few air-fluid levels but they are nondifferential (less than 2 5 cm height difference in a single loop)  Gas extends to the sigmoid colon  Chain sutures at the rectosigmoid colon  Paucity of gas projecting over the distal ileoanal anastomosis  No free air or suspicious air-fluid levels  No pathologic calcifications or soft tissue masses evident  Osseous structures are unremarkable  Impression: 1  Bowel gas pattern most suggestive of ileus or enteritis  No evidence for progressive obstruction  2   No acute cardiopulmonary findings  Workstation performed: [id number]     CT pe study w abdomen pelvis w contrast    Result Date: 3/6/2022  Narrative: CT PULMONARY ANGIOGRAM OF THE CHEST AND CT ABDOMEN AND PELVIS WITH INTRAVENOUS CONTRAST INDICATION:   Epigastric pain epigastric abdominal pain  COMPARISON:  2/23/2022 TECHNIQUE:  CT examination of the chest, abdomen and pelvis was performed  Thin section CT angiographic technique was used in the chest in order to evaluate for pulmonary embolus and coronal 3D MIP postprocessing was performed on the acquisition scanner  Axial, sagittal, and coronal 2D reformatted images were created from the source data and submitted for interpretation  Radiation dose length product (DLP) for this visit:  717 mGy-cm     This examination, like all CT scans performed in the [facility], was performed utilizing techniques to minimize radiation dose exposure, including the use of iterative reconstruction and automated exposure control  IV Contrast:  100 mL of iohexol (OMNIPAQUE) Enteric Contrast:  Enteric contrast was not administered  FINDINGS: CHEST PULMONARY ARTERIAL TREE:  No pulmonary embolus is seen  LUNGS:  Right lower lobe centrilobular nodular changes concerning for an infectious or inflammatory process (2:)  There is no tracheal or endobronchial lesion  PLEURA:  Unremarkable  HEART/AORTA:  Heart is unremarkable for patient's age  No thoracic aortic aneurysm  MEDIASTINUM AND GREGORIO:  There are small hiatal hernia    CHEST WALL AND LOWER NECK:   Unremarkable  ABDOMEN LIVER/BILIARY TREE:  Liver is diffusely decreased in density consistent with fatty change  No CT evidence of suspicious hepatic mass  Normal hepatic contours  No biliary dilatation  GALLBLADDER:  No calcified gallstones  No pericholecystic inflammatory change  SPLEEN:  Unremarkable  PANCREAS:  Unremarkable  ADRENAL GLANDS:  Unremarkable  KIDNEYS/URETERS:  Unremarkable  No hydronephrosis  STOMACH AND BOWEL:  Patient is post colectomy  There is mural thickening of the rectum and distal small bowel concerning for enteritis/proctitis/pouchitis    APPENDIX:  Not visualized consistent with prior colectomy  ABDOMINOPELVIC CAVITY:  No ascites  No pneumoperitoneum  There is a 1 3 cm lymph node in the right iliac chain (3:64)  This is similar to the prior exam  VESSELS:  Unremarkable for patient's age  PELVIS REPRODUCTIVE ORGANS:  Unremarkable for patient's age  URINARY BLADDER:  Unremarkable  ABDOMINAL WALL/INGUINAL REGIONS:  Unremarkable  OSSEOUS STRUCTURES:  No acute fracture or destructive osseous lesion  Impression: Right lower lobe centrilobular nodular changes concerning for an infectious or inflammatory process (2:)   Patient is post colectomy  There is mural thickening of the rectum and distal small bowel concerning for enteritis/proctitis/pouchitis    There is a 1 3 cm lymph node in the right iliac chain (3:64)  This is similar to the prior exam  Workstation performed: [id number]     CT abdomen pelvis with contrast    Result Date: 2/24/2022  Narrative: CT ABDOMEN AND PELVIS WITH IV CONTRAST INDICATION:   Sepsis  COMPARISON:  CT of abdomen pelvis on February 21, 2022  TECHNIQUE:  CT examination of the abdomen and pelvis was performed  Axial, sagittal, and coronal 2D reformatted images were created from the source data and submitted for interpretation  Radiation dose length product (DLP) for this visit:  756 79 mGy-cm   This examination, like all CT scans performed in the [facility], was performed utilizing techniques to minimize radiation dose exposure, including the use of iterative  reconstruction and automated exposure control  IV Contrast:  100 mL of iohexol (OMNIPAQUE) Enteric Contrast:  Enteric contrast was not administered  FINDINGS: ABDOMEN LOWER CHEST:  No clinically significant abnormality identified in the visualized lower chest  LIVER/BILIARY TREE:  Unremarkable  GALLBLADDER:  No calcified gallstones  No pericholecystic inflammatory change  SPLEEN:  Unremarkable  PANCREAS:  Unremarkable  ADRENAL GLANDS:  Unremarkable  KIDNEYS/URETERS:  Unremarkable  No hydronephrosis  STOMACH AND BOWEL:  Status post colectomy and ileal pouch anal anastomosis  Stable mild wall thickening of the distal ileum and pouch  No bowel obstruction  Stable distention of the rectal pouch  Stable gaseous distention of a loop of bowel in the upper abdomen  APPENDIX:  Absent ABDOMINOPELVIC CAVITY:  No ascites  No pneumoperitoneum  Stable enlarged right external iliac lymph node measuring 1 5 cm in short axis (series 2, image 69)    Stable right para-aortic lymph noted measuring 1 2 cm in short axis (series 2, and 49) VESSELS:  Unremarkable for patient's age  PELVIS REPRODUCTIVE ORGANS:  Unremarkable for patient's age  URINARY BLADDER:  Unremarkable  ABDOMINAL WALL/INGUINAL REGIONS:  Unremarkable  OSSEOUS STRUCTURES:  No acute fracture or destructive osseous lesion  Impression: No significant interval change  Stable mild wall thickening of the distal ileum and pouch which may be due to nonspecific enteritis  Workstation performed: [id number]     CT abdomen pelvis w contrast    Result Date: 2/21/2022  Narrative: CT ABDOMEN AND PELVIS WITH IV CONTRAST INDICATION:   llq pain history of crohn's/pancolitis  COMPARISON:  January 2, 2022 TECHNIQUE:  CT examination of the abdomen and pelvis was performed  Axial, sagittal, and coronal 2D reformatted images were created from the source data and submitted for interpretation  Radiation dose length product (DLP) for this visit:  170 99 mGy-cm   This examination, like all CT scans performed in the [facility], was performed utilizing techniques to minimize radiation dose exposure, including the use of iterative  reconstruction and automated exposure control  IV Contrast:  100 mL of iohexol (OMNIPAQUE) Enteric Contrast:  Enteric contrast was not administered  FINDINGS: ABDOMEN LOWER CHEST:  No clinically significant abnormality identified in the visualized lower chest  LIVER/BILIARY TREE:  Unremarkable  GALLBLADDER:  Contracted  No calcified stones  SPLEEN:  Unremarkable  PANCREAS:  Unremarkable  ADRENAL GLANDS:  Unremarkable  KIDNEYS/URETERS:  Unremarkable  No hydronephrosis  STOMACH AND BOWEL: As stated previously: Patient is status post colectomy and ileal pouch-anal anastomosis  There is no evidence of obstruction  The previous mild bowel wall thickening and mucosal hyperemia in right lower quadrant small bowel creating the ileal pouch persists, (currently best seen on image 2/61-2/79)  Most likely chronic inflammatory enteritis   APPENDIX:  Removed ABDOMINOPELVIC CAVITY:  Stable 1 2 cm aortocaval node (image 2/50) and a stable 1 5 cm right external iliac node  (Image 2/68)  Both have demonstrated long-term stability  Trace fluid in the right pelvis in the obturator region  No free air  VESSELS:  Unremarkable for patient's age  PELVIS REPRODUCTIVE ORGANS:  Unremarkable for patient's age  URINARY BLADDER:  Unremarkable  ABDOMINAL WALL/INGUINAL REGIONS:  Unremarkable  OSSEOUS STRUCTURES:  No acute fracture or destructive osseous lesion  Impression: Patient is status post colectomy and ileal pouch-anal anastomosis  Similar appearance compared from prior exam with persistent mild small bowel wall thickening involving the distal small bowel creating the pouch, but with less pronounced mucosal enhancement  This likely represents chronic inflammatory enteritis  No obstruction or perforation  Trace amounts of pelvic fluid is seen in the right obturator location  Persistent aortocaval and right external iliac adenopathy, which have demonstrated long-term stability  Workstation performed: [id number]     IR PICC line placement single lumen (preferred for home anitbiotics/medications)    Result Date: 3/1/2022  Narrative: PERCUTANEOUSLY INSERTED CENTRAL VENOUS CATHETER PLACEMENT HISTORY:   Patient in need of a PICC line for IV antibiotics as an outpatient  FLUORO TIME: 0 2 min NUMBER OF IMAGES:  One fluoroscopic image PROCEDURE:  The patient was brought to the Interventional Radiology Suite and identified verbally and by wrist band  The right brachial vein was evaluated as a potential access site with ultrasound  The vessel was found to be patent and compressible  The site was prepped and draped in the usual sterile fashion  All elements of maximal sterile barrier technique, cap and mask and sterile gown and sterile gloves and sterile full-body drape and hand hygiene and 2% chlorhexidine for cutaneous antisepsis    Sterile ultrasound technique with sterile gel and sterile probe covers was also utilized  Lidocaine was administered to the skin and a small skin incision was made  Under ultrasound guidance, utilizing sterile ultrasound technique with sterile gel and a sterile probe cover, the right brachial vein was accessed using single wall Seldinger technique  Static images of real time needle entry into the vessel were obtained  This was followed by a  018 guidewire and a sheath and dilator tapered to   018  A 4 [place] single lumen central venous catheter was then advanced under fluoroscopic guidance to the cavoatrial junction  The catheter was cut at 37 cm with 0 cm external length  The position was confirmed fluoroscopically  Blood could be freely aspirated and heparinized saline injected  Patient experienced no untoward reactions  Impression: 1  Status post placement of a 4 [place] single lumen central venous catheter via the right brachial vein with its tip at the cavoatrial junction under ultrasound and fluoroscopic guidance  Workstation performed: [id number]     Echo complete w/ contrast if indicated    Result Date: 3/2/2022  Narrative: Kristina Hart  Left Ventricle: Left ventricular cavity size is normal  Wall thickness is normal  The left ventricular ejection fraction is 55% by visual estimation  Systolic function is normal  Wall motion is normal  Diastolic function is normal for age    Mitral Valve: There is trace regurgitation    Tricuspid Valve: There is no indirect evidence of pulmonary hypertension    Patient has a mobile echodensity which is attached to sub valvular structures  Differential diagnosis could be redundant cardia but cannot rule out small vegetation consider BUCKY  No significant valvulopathy is noted  BUCKY    Result Date: 3/2/2022  Narrative: Kristina Hart  Left Ventricle: Left ventricular cavity size is normal  Wall thickness is normal  The left ventricular ejection fraction is 55% by visual estimation   Systolic function is normal  Wall motion is normal    Atrial Septum: There is no atrial septal defect by color Doppler and saline contrast  No patent foramen ovale confirmed at rest    Left Atrial Appendage: There is normal function    Aortic Valve: The aortic valve is trileaflet  The leaflets are mildly thickened  The leaflets are not calcified  The leaflets exhibit normal mobility  No typical vegetation noted no significant associated valvulopathy    Mitral Valve: There is mild focal thickening  Of subvalvular apparatus noted there is a echodensity noted attached to chordee most likely redundant cardia but cannot rule out small vegetation  There is no significant associated valvulopathy  Discussed with ID   Echodensity noted on mitral valve is not typical for vegetation differential diagnosis could be redundant chordae versus small vegetation  It has to be correlated clinically  If he patient continues to have recurrent bacteremia may need a follow-up imaging study  Problem List:   Patient Active Problem List   Diagnosis    Chronic ulcerative pancolitis (HCC)    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HCC)    Stricture of rectum    CRA (generalized anxiety disorder)    BMI 28 0-28 9,adult    History of ulcerative colitis    Leukocytosis    Hyponatremia    SARAHI [facility]acute kidney [facility]) ([facility] [address] )    Complications of intestinal pouch (HCC)    Left groin pain    Left-sided low back pain without sciatica    Sacroiliac joint dysfunction of right side    Arthralgia    Arm and leg movements, uncontrollable    Seizure-like activity (HCC)    Sepsis (HCC)    Ankylosing spondylitis (HCC)    Elevated LFTs    Enteritis    SIRS (systemic inflammatory response syndrome) (HCC)    Anemia    Polymicrobial after streptococcal bacteremia    Tachycardia    Pouchitis (HCC)    Hypokalemia    Presumed AV endocarditis    Hives    Coagulase negative Staphylococcus bacteremia         Brandon Alejo MD      Please Note: "This note has been constructed using a voice recognition system  Therefore there may be syntax, spelling, and/or grammatical errors   Please call if you have any questions  "**

## 2022-03-09 NOTE — PROGRESS NOTES
Progress Note - Infectious Disease   Eli Zhu 27 y o  male MRN: [id number]  Unit/Bed#: [address] [place] [address] Encounter: [id number]      Impression/Recommendations:  1  Polymicrobial alpha streptococcal bacteremia, felt to be secondary to translocation from gut   Patient is clinically much improved   Bacteremia had cleared prior to recent discharge  Continue IV antibiotic but change to IV vancomycin, as in below  Treat x4 weeks total (antibiotic # 12), through 3/23, as previously planned      2  Presumptive AV endocarditis   2D echo and BUCKY were not definitive but showed possible vegetation at base of AV   No evidence of septic emboli     Antibiotic plan as in above      3  Recent coagulase-negative Staphylococcus bacteremia, felt to be secondary to contaminated blood draw     No antibiotic or further workup needed for this      4  Hives, most likely secondary to cefazolin   Hives have resolved   Will complete antibiotic course with IV vancomycin  Vancomycin was held due to concern for tachycardia secondary to it  After extensive discussion with patient's family, IV vancomycin was restarted a slower infusion rate  Patient tolerated well  Will continue IV vancomycin, slowly increasing infusion rate, eventually down to 2 hr duration per dose  Continue  IV vancomycin   Slowly increase infusion rate  Monitor for recurrent hives, rash, or tachycardia      5  Recent enteritis   Patient is clinically improved, although he still has mild RUQ abdominal pain     Monitor abdominal pain      6  UC, status post colectomy         7  Ankylosing spondylitis, on outpatient Xelijasmin      Discussed with patient in detail regarding the above plan     Discussed with Dr Hussein Oliva from [facility]    Anticipate discharge in 24 hours, once stable vancomycin dose is established and infusion duration can be decreased to a more manageable home regimen      Antibiotics:  Vancomycin  Antibiotic # 14      Subjective:  Patient with stable lower abdominal and rectal pain  No further hives  No palpitations  Temperature stays down  No chills  He tolerated vancomycin at slow infusion rate well, with no tachycardia      Objective:  Vitals:  Temp:  [97 6 °F (36 4 °C)-97 7 °F (36 5 °C)] 97 6 °F (36 4 °C)  HR:  [] 67  Resp:  [17-18] 17  BP: (129-146)/() 146/103  SpO2:  [94 %-99 %] 97 %  Temp (24hrs), Av 7 °F (36 5 °C), Min:97 6 °F (36 4 °C), Max:97 7 °F (36 5 °C)  Current: Temperature: 97 6 °F (36 4 °C)    Physical Exam:     General: Awake, alert, cooperative, no distress  Neck:  Supple  No mass  No lymphadenopathy  Lungs: Expansion symmetric, no rales, no wheezing, respirations unlabored  Heart:  Regular rate and rhythm, S1 and S2 normal, no murmur  Abdomen: Soft, nondistended, stable mild RLQ tenderness, bowel sounds active all four quadrants, no masses, no organomegaly  Extremities: No edema  No erythema/warmth  No ulcer  Nontender to palpation  Skin:  No rash  Neuro: Moves all extremities  Invasive Devices  Report    Peripherally Inserted Central Catheter Line            PICC Line 22 Right Brachial 7 days          Peripheral Intravenous Line            Peripheral IV 22 Left Arm 2 days                Labs studies:   I have personally reviewed pertinent labs  Results from last 7 days   Lab Units 22  0542 22  0412 22  0512 22  1555 22  1555   POTASSIUM mmol/L 4 4 4 2 4 2   < > 3 2*   CHLORIDE mmol/L 106 102 103   < > 105   CO2 mmol/L 27 27 27   < > 24   BUN mg/dL 7 7 6   < > 7   CREATININE mg/dL 0 88 0 96 1 05   < > 0 79   EGFR ml/min/1 73sq m 116 107 96   < > 122   CALCIUM mg/dL 8 1* 8 3 8 5   < > 8 7   AST U/L  --   --   --   --  26   ALT U/L  --   --   --   --  32   ALK PHOS U/L  --   --   --   --  81    < > = values in this interval not displayed       Results from last 7 days   Lab Units 22  0542 22  0412 22  0512   WBC Thousand/uL 13 58* 18 05* 15 98* HEMOGLOBIN g/dL 9 9* 10 5* 10 9*   PLATELETS Thousands/uL 484* 518* 509*     Results from last 7 days   Lab Units 03/06/22  1555   BLOOD CULTURE  No Growth at 48 hrs  No Growth at 48 hrs  Imaging Studies:   I have personally reviewed pertinent imaging study reports and images in PACS  EKG, Pathology, and Other Studies:   I have personally reviewed pertinent reports

## 2022-03-09 NOTE — PROGRESS NOTES
Vancomycin Assessment    Luly Montero is a 27 y o  male who is currently receiving vancomycin 1250 mg IV q8h for bacteremia     Relevant clinical data and objective history reviewed:  Creatinine   Date Value Ref Range Status   03/09/2022 0 88 0 60 - 1 30 mg/dL Final     Comment:     Standardized to IDMS reference method   03/08/2022 0 96 0 60 - 1 30 mg/dL Final     Comment:     Standardized to IDMS reference method   03/07/2022 1 05 0 60 - 1 30 mg/dL Final     Comment:     Standardized to IDMS reference method   10/31/2015 0 9 0 6 - 1 3 mg/dL    10/30/2015 1 0 0 6 - 1 3 mg/dL      BP (!) 143/109   Pulse 78   Temp 97 9 °F (36 6 °C)   Resp 19   Ht 5' 9" (1 753 m)   Wt 86 4 kg (190 lb 7 6 oz)   SpO2 98%   BMI 28 13 kg/m²   I/O last 3 completed shifts: In: 2846 7 [P O :720; I V :2126 7]  Out: -   Lab Results   Component Value Date/Time    BUN 7 03/09/2022 05:42 AM    BUN 7 10/26/2018 07:34 AM    WBC 13 58 (H) 03/09/2022 05:42 AM    WBC 9 0 10/31/2015 06:00 AM    HGB 9 9 (L) 03/09/2022 05:42 AM    HGB 10 5 (L) 10/31/2015 06:00 AM    HCT 35 4 (L) 03/09/2022 05:42 AM    HCT 34 2 (L) 10/31/2015 06:00 AM    MCV 65 (L) 03/09/2022 05:42 AM    MCV 61 3 (L) 10/31/2015 06:00 AM     (H) 03/09/2022 05:42 AM     10/31/2015 06:00 AM     Temp Readings from Last 3 Encounters:   03/09/22 97 9 °F (36 6 °C)   03/03/22 (!) 97 3 °F (36 3 °C)   02/21/22 97 9 °F (36 6 °C) (Oral)     Vancomycin Days of Therapy: 4    Assessment/Plan  The patient is currently on vancomycin utilizing scheduled dosing  Baseline risks associated with therapy include: concomitant nephrotoxic medications  The patient is receiving 1250 mg IV q8h with the most recent vancomycin level being not at steady-state and sub-therapeutic based on a goal of 15-20 (appropriate for most indications) ; therefore, after clinical evaluation will be changed to 1750mg IV q8h     Pharmacy will continue to follow closely for s/sx of nephrotoxicity, infusion reactions, and appropriateness of therapy  BMP and CBC will be ordered per protocol  Plan for trough as patient approaches steady state, prior to the 4th  dose at approximately 1730 on 3/10/22  Pharmacy will continue to follow the patients culture results and clinical progress daily      Kayla Brady, Pharmacist

## 2022-03-10 LAB
ALBUMIN SERPL BCP-MCNC: 2.8 G/DL (ref 3.5–5)
ALP SERPL-CCNC: 57 U/L (ref 46–116)
ALT SERPL W P-5'-P-CCNC: 24 U/L (ref 12–78)
ANION GAP SERPL CALCULATED.3IONS-SCNC: 7 MMOL/L (ref 4–13)
AST SERPL W P-5'-P-CCNC: 13 U/L (ref 5–45)
BASOPHILS # BLD AUTO: 0.14 THOUSANDS/ΜL (ref 0–0.1)
BASOPHILS NFR BLD AUTO: 2 % (ref 0–1)
BILIRUB SERPL-MCNC: 0.47 MG/DL (ref 0.2–1)
BUN SERPL-MCNC: 7 MG/DL (ref 5–25)
CALCIUM ALBUM COR SERPL-MCNC: 8.7 MG/DL (ref 8.3–10.1)
CALCIUM SERPL-MCNC: 7.7 MG/DL (ref 8.3–10.1)
CAMPYLOBACTER DNA SPEC NAA+PROBE: NORMAL
CHLORIDE SERPL-SCNC: 106 MMOL/L (ref 100–108)
CO2 SERPL-SCNC: 27 MMOL/L (ref 21–32)
CREAT SERPL-MCNC: 0.93 MG/DL (ref 0.6–1.3)
EOSINOPHIL # BLD AUTO: 0.15 THOUSAND/ΜL (ref 0–0.61)
EOSINOPHIL NFR BLD AUTO: 2 % (ref 0–6)
ERYTHROCYTE [DISTWIDTH] IN BLOOD BY AUTOMATED COUNT: 17.2 % (ref 11.6–15.1)
GFR SERPL CREATININE-BSD FRML MDRD: 111 ML/MIN/1.73SQ M
GLUCOSE P FAST SERPL-MCNC: 82 MG/DL (ref 65–99)
GLUCOSE SERPL-MCNC: 82 MG/DL (ref 65–140)
HCT VFR BLD AUTO: 32.2 % (ref 36.5–49.3)
HGB BLD-MCNC: 9.2 G/DL (ref 12–17)
IMM GRANULOCYTES # BLD AUTO: 0.05 THOUSAND/UL (ref 0–0.2)
IMM GRANULOCYTES NFR BLD AUTO: 1 % (ref 0–2)
LYMPHOCYTES # BLD AUTO: 2.3 THOUSANDS/ΜL (ref 0.6–4.47)
LYMPHOCYTES NFR BLD AUTO: 25 % (ref 14–44)
MCH RBC QN AUTO: 18.7 PG (ref 26.8–34.3)
MCHC RBC AUTO-ENTMCNC: 28.6 G/DL (ref 31.4–37.4)
MCV RBC AUTO: 65 FL (ref 82–98)
MONOCYTES # BLD AUTO: 0.91 THOUSAND/ΜL (ref 0.17–1.22)
MONOCYTES NFR BLD AUTO: 10 % (ref 4–12)
NEUTROPHILS # BLD AUTO: 5.51 THOUSANDS/ΜL (ref 1.85–7.62)
NEUTS SEG NFR BLD AUTO: 60 % (ref 43–75)
NRBC BLD AUTO-RTO: 0 /100 WBCS
PLATELET # BLD AUTO: 422 THOUSANDS/UL (ref 149–390)
PMV BLD AUTO: 8.6 FL (ref 8.9–12.7)
POTASSIUM SERPL-SCNC: 3.6 MMOL/L (ref 3.5–5.3)
PROT SERPL-MCNC: 5.8 G/DL (ref 6.4–8.2)
RBC # BLD AUTO: 4.92 MILLION/UL (ref 3.88–5.62)
SALMONELLA DNA SPEC QL NAA+PROBE: NORMAL
SHIGA TOXIN STX GENE SPEC NAA+PROBE: NORMAL
SHIGELLA DNA SPEC QL NAA+PROBE: NORMAL
SODIUM SERPL-SCNC: 140 MMOL/L (ref 136–145)
VANCOMYCIN TROUGH SERPL-MCNC: 26.5 UG/ML (ref 10–20)
WBC # BLD AUTO: 9.06 THOUSAND/UL (ref 4.31–10.16)

## 2022-03-10 PROCEDURE — 85025 COMPLETE CBC W/AUTO DIFF WBC: CPT | Performed by: INTERNAL MEDICINE

## 2022-03-10 PROCEDURE — 99232 SBSQ HOSP IP/OBS MODERATE 35: CPT | Performed by: INTERNAL MEDICINE

## 2022-03-10 PROCEDURE — 99232 SBSQ HOSP IP/OBS MODERATE 35: CPT | Performed by: NURSE PRACTITIONER

## 2022-03-10 PROCEDURE — 80202 ASSAY OF VANCOMYCIN: CPT | Performed by: INTERNAL MEDICINE

## 2022-03-10 PROCEDURE — 80053 COMPREHEN METABOLIC PANEL: CPT | Performed by: INTERNAL MEDICINE

## 2022-03-10 RX ORDER — SACCHAROMYCES BOULARDII 250 MG
250 CAPSULE ORAL 2 TIMES DAILY
Status: DISCONTINUED | OUTPATIENT
Start: 2022-03-10 | End: 2022-03-12 | Stop reason: HOSPADM

## 2022-03-10 RX ORDER — HYDROMORPHONE HYDROCHLORIDE 2 MG/1
2 TABLET ORAL EVERY 6 HOURS PRN
Status: DISCONTINUED | OUTPATIENT
Start: 2022-03-10 | End: 2022-03-11

## 2022-03-10 RX ORDER — IRON POLYSACCHARIDE COMPLEX 150 MG
150 CAPSULE ORAL DAILY
Status: DISCONTINUED | OUTPATIENT
Start: 2022-03-10 | End: 2022-03-12 | Stop reason: HOSPADM

## 2022-03-10 RX ORDER — DICYCLOMINE HYDROCHLORIDE 10 MG/1
10 CAPSULE ORAL
Status: DISCONTINUED | OUTPATIENT
Start: 2022-03-10 | End: 2022-03-12 | Stop reason: HOSPADM

## 2022-03-10 RX ADMIN — METRONIDAZOLE 500 MG: 500 TABLET ORAL at 21:03

## 2022-03-10 RX ADMIN — HYDROMORPHONE HYDROCHLORIDE 1 MG: 1 INJECTION, SOLUTION INTRAMUSCULAR; INTRAVENOUS; SUBCUTANEOUS at 03:02

## 2022-03-10 RX ADMIN — HYDROMORPHONE HYDROCHLORIDE 1 MG: 1 INJECTION, SOLUTION INTRAMUSCULAR; INTRAVENOUS; SUBCUTANEOUS at 21:57

## 2022-03-10 RX ADMIN — HYDROMORPHONE HYDROCHLORIDE 1 MG: 1 INJECTION, SOLUTION INTRAMUSCULAR; INTRAVENOUS; SUBCUTANEOUS at 15:26

## 2022-03-10 RX ADMIN — Medication 250 MG: at 17:51

## 2022-03-10 RX ADMIN — PROMETHAZINE HYDROCHLORIDE 12.5 MG: 25 INJECTION INTRAMUSCULAR; INTRAVENOUS at 03:01

## 2022-03-10 RX ADMIN — VANCOMYCIN HYDROCHLORIDE 1750 MG: 10 INJECTION, POWDER, LYOPHILIZED, FOR SOLUTION INTRAVENOUS at 18:01

## 2022-03-10 RX ADMIN — DICYCLOMINE HYDROCHLORIDE 10 MG: 10 CAPSULE ORAL at 21:03

## 2022-03-10 RX ADMIN — POLYSACCHARIDE-IRON COMPLEX 150 MG: 150 CAPSULE ORAL at 17:51

## 2022-03-10 RX ADMIN — VANCOMYCIN HYDROCHLORIDE 1750 MG: 10 INJECTION, POWDER, LYOPHILIZED, FOR SOLUTION INTRAVENOUS at 02:30

## 2022-03-10 RX ADMIN — PROMETHAZINE HYDROCHLORIDE 12.5 MG: 25 INJECTION INTRAMUSCULAR; INTRAVENOUS at 15:26

## 2022-03-10 RX ADMIN — PROMETHAZINE HYDROCHLORIDE 12.5 MG: 25 INJECTION INTRAMUSCULAR; INTRAVENOUS at 09:19

## 2022-03-10 RX ADMIN — HYDROMORPHONE HYDROCHLORIDE 1 MG: 1 INJECTION, SOLUTION INTRAMUSCULAR; INTRAVENOUS; SUBCUTANEOUS at 09:19

## 2022-03-10 RX ADMIN — DICYCLOMINE HYDROCHLORIDE 10 MG: 10 CAPSULE ORAL at 17:51

## 2022-03-10 RX ADMIN — METRONIDAZOLE 500 MG: 500 TABLET ORAL at 09:20

## 2022-03-10 RX ADMIN — HYDROMORPHONE HYDROCHLORIDE 1 MG: 1 INJECTION, SOLUTION INTRAMUSCULAR; INTRAVENOUS; SUBCUTANEOUS at 18:34

## 2022-03-10 RX ADMIN — HYDROMORPHONE HYDROCHLORIDE 1 MG: 1 INJECTION, SOLUTION INTRAMUSCULAR; INTRAVENOUS; SUBCUTANEOUS at 12:20

## 2022-03-10 RX ADMIN — HYDROMORPHONE HYDROCHLORIDE 1 MG: 1 INJECTION, SOLUTION INTRAMUSCULAR; INTRAVENOUS; SUBCUTANEOUS at 06:13

## 2022-03-10 RX ADMIN — FAMOTIDINE 20 MG: 10 INJECTION INTRAVENOUS at 09:19

## 2022-03-10 RX ADMIN — DULOXETINE HYDROCHLORIDE 30 MG: 30 CAPSULE, DELAYED RELEASE ORAL at 09:19

## 2022-03-10 RX ADMIN — VANCOMYCIN HYDROCHLORIDE 1750 MG: 10 INJECTION, POWDER, LYOPHILIZED, FOR SOLUTION INTRAVENOUS at 10:33

## 2022-03-10 NOTE — UTILIZATION REVIEW
Initial Clinical Review  PATIENT WAS OBSERVATION STATUS 3/6/22 CONVERTED TO INPATIENT ADMISSION 3/10/22 AS NEEDING CONTINUED STAY FOR ENTERITIS WITH RUQ ABD PAIN ADDITION OF FLAGYL     Admission: Date/Time/Statement:   Admission Orders (From admission, onward)     Ordered        03/10/22 0830  Inpatient Admission  Once            03/06/22 1839  Place in Observation  Once                      Orders Placed This Encounter   Procedures    Place in Observation     Standing Status:   Standing     Number of Occurrences:   1     Order Specific Question:   Level of Care     Answer:   Med Surg [16]    Inpatient Admission     Standing Status:   Standing     Number of Occurrences:   1     Order Specific Question:   Level of Care     Answer:   Med Surg [16]     Order Specific Question:   Estimated length of stay     Answer:   More than 2 Midnights     Order Specific Question:   Certification     Answer:   I certify that inpatient services are medically necessary for this patient for a duration of greater than two midnights  See H&P and MD Progress Notes for additional information about the patient's course of treatment  ED Arrival Information     Expected Arrival Acuity    - 3/6/2022 15:24 Emergent         Means of arrival Escorted by Service Admission type    [facility] Emergency         Arrival complaint    Chest pain        Chief Complaint   Patient presents with    Allergic Reaction     Pt experiencing hives to neck and abdominal pain after administering cefazolin  Pt does not c/o SOB  Initial Presentation:   27 y o  male with a PMH of polymicrobial bacteremia and pouchitis s/p recent pouchoscopy with dilation presenting with a rash s/p self administering dose of cefazolin today  Patient found to be in SVT upon arrival to ED and received three doses of adenosine and then started on cardizem infusion  Patient denies any chest pain, palpitations, shortness of breath     Tachycardia   Noted to be in SVT upon arrival to ED /p adenosine x3 and cardizem infusion   ICU recommendations appreciated Monitor on telemetry Lopressor PRN Cardiology consulted ECHO with normal systolic and diastolic function, echodensity on mitral valve  Pouchitis  S/p pouchoscopy along with TTS balloon dilatation on previous admission   Continue hydrocortisone enemas    Positive blood cultures  Patient started treatment with cefazolin on 02/28  Vancomycin dose x1   Hypokalemia Monitor and replete prn  DATE 3/7/22   CARDIOLOGY CONSULT  SVT- reviewed EKGs which are sinus tachycardia  Low suspicion for a re-entry supraventricular tachycardia  Possibly reactionary- antihistamine release/allergic reaction/drug reaction? Re-evaluation of patient's medications  Polymicrobial bacteremia secondary to proctitis and ulcer colitis- BUCKY with mild focal thickening of subvalvular apparatus could not completely rule out small vegetation versus redundant chordae  Prior cardiology discussion with ID- plan to treat on antibiotics given high risk history including chronic immunosuppression + bacteremia  ID CONSULT  This is a 27 y o  male, history of UC, status post colectomy, recently admitted for acute enteritis with secondary polymicrobial bacteremia  Patient came back to ER yesterday due to hives, subsequently developing SVT   Continue IV antibiotic but change to IV vancomycin, as in below  Treat x4 weeks total (antibiotic # 12), through 3/23, as previously planned    DATE 3/8/22 pbs  Felt hot and associated redness with IV vancomycin infusion yesterday, noted to have associated tachycardia which eventually improved after receiving IV metoprolol and Cardizem  Denies chest pain shortness of breath  Feels anxious  Reports mild nausea, tolerating diet  Ongoing loose bowel movement which is at baseline    Denies any bleeding  Discussed regarding limiting opiates which may be contributing to some of his GI symptoms, patient feels that oxycodone does causing constipation but denies Dilaudid having same effect  /08/22 07:48:05 97 5 °F (36 4 °C) 98 20 135/92 106 95 %   Telemetry Reviewed: Normal Sinus Rhythm episode of sinus tachycardia last evening  Indication for Continued Telemetry Use: Arrthymias requiring medical therapy    DATE 3/9/22 obs  GI CONSULT  Patient currently on vancomycin for treatment of streptococcal bacteremia  Would recommend adding metronidazole 500 mg p o  B i d  For 14 days  Discontinue hydrocortisone enemas  Other infectious etiologies including Clostridium difficile are also possible, and further evaluation with stool studies is recommended  Abdomen : Tenderness: There is abdominal tenderness  03/09/22 07:52:30 97 6 °F (36 4 °C) 67 17 146/103 Abnormal  117 97 % -- --     3/10/22 DAY 1  CONVERTED TO INPATIENT ADMISSION PATIENT WITH EXTENDED OBSERVATION >72 HRS  PER ID Polymicrobial alpha streptococcal bacteremia  Presumptive AV endocarditis  Continue IV vancomycin, as in below  Treat x4 weeks total   Given loss of approximately 24 hours of treatment during this hospitalization, will extend treatment course but 24 hours, through 3/24   Recent enteritis   Patient is clinically improved, although he still has mild RUQ abdominal pain  Per GI Concern for pouchitis noted  Addition of Flagyl noted   Monitor abdominal pain  , will trial Bentyl and probiotic  Flagyl per GI  Still with lower abdominal discomfort, reported significantly worsening of pain last night requiring additional IV pain medication, pain improved after a bowel movement   Reports small amount of intermittent bright red blood   Mild nausea, appetite fair  03/10/22 07:53:38 97 5 °F (36 4 °C) 73 18 126/83 97 98 %     DATE: 3/11/22 DAY 2  Per GI Pouchitis   Patient reports symptoms have improved significantly from yesterday, he has only moved his bowels 4 times today and has less urgency    Abdominal pain is improved and he reported he had required any pain medicine in several hours  Enteric stool panel negative, O and P still pending  Continue metronidazole 500 mg p o  BID for total of 14 days   Continue Bentyl  Continue probiotic  avoid NSAIDs, continue low-fiber/low residue diet  Follow up O&P  Follow-up outpatient with [facility]  ATTENDING  Polymicrobial after streptococcal bacteremia  Restarted IV vancomycin , initially slow infusion which was gradually increased  Patient is tolerating without any intolerance since 03/08  Continue IV vancomycin  Monitor for any reaction  Plan to continue 4 weeks through 3/24  Monitor symptoms     ED Triage Vitals [03/06/22 1525]   Temperature Pulse Respirations Blood Pressure SpO2   98 2 °F (36 8 °C) (!) 128 18 143/91 97 %      Temp Source Heart Rate Source Patient Position - Orthostatic VS BP Location FiO2 (%)   Tympanic Monitor Lying Left arm --      Pain Score       8          Wt Readings from Last 1 Encounters:   03/06/22 86 4 kg (190 lb 7 6 oz)     Additional Vital Signs:   03/07/22 03:05:41 97 6 °F (36 4 °C) 95 14 131/87 102 94 % -- --   03/07/22 02:34:06 -- 65 -- 118/73 88 97 % -- Lying   03/06/22 23:51:04 99 °F (37 2 °C) 101 20 140/101 Abnormal  114 96 % -- --   03/06/22 22:18:04 -- 105 -- 118/85 96 96 % -- --   03/06/22 2106 -- -- -- -- -- -- None (Room air) --   03/06/22 20:50:54 98 2 °F (36 8 °C) 91 -- 126/79 95 97 % -- --   03/06/22 2000 -- 133 Abnormal  18 109/78 90 94 % None (Room air) Lying     Pertinent Labs/Diagnostic Test Results:   3/10/22 XR abd complete inc upright /decubitus  Chronically dilated loop of small bowel in the upper abdomen   No evidence of obstruction   No free air  CT pe study w abdomen pelvis w contrast   Final Result by Yaniv Butler MD (03/06 2139)      Right lower lobe centrilobular nodular changes concerning for an infectious or inflammatory process (2:)  Patient is post colectomy   There is mural thickening of the rectum and distal small bowel concerning for enteritis/proctitis/pouchitis         There is a 1 3 cm lymph node in the right iliac chain (3:64)  This is similar to the prior exam          XR chest 1 view portable    (Results Pending)         Results from last 7 days   Lab Units 03/10/22  0610 03/09/22  0542 03/08/22  0412 03/07/22  0512 03/07/22  0512 03/06/22  1555 03/06/22  1555   WBC Thousand/uL 9 06 13 58* 18 05*   < > 15 98*   < > 11 06*   HEMOGLOBIN g/dL 9 2* 9 9* 10 5*  --  10 9*  --  12 3   HEMATOCRIT % 32 2* 35 4* 36 1*  --  37 0  --  42 5   PLATELETS Thousands/uL 422* 484* 518*   < > 509*   < > 612*   NEUTROS ABS Thousands/µL 5 51 11 70* 15 55*   < > 12 52*   < > 7 77*    < > = values in this interval not displayed       Results from last 7 days   Lab Units 03/10/22  0610 03/09/22  0542 03/08/22  0412 03/07/22  0512 03/06/22  1555   SODIUM mmol/L 140 140 137 138 141   POTASSIUM mmol/L 3 6 4 4 4 2 4 2 3 2*   CHLORIDE mmol/L 106 106 102 103 105   CO2 mmol/L 27 27 27 27 24   ANION GAP mmol/L 7 7 8 8 12   BUN mg/dL 7 7 7 6 7   CREATININE mg/dL 0 93 0 88 0 96 1 05 0 79   EGFR ml/min/1 73sq m 111 116 107 96 122   CALCIUM mg/dL 7 7* 8 1* 8 3 8 5 8 7   MAGNESIUM mg/dL  --   --   --   --  2 1     Results from last 7 days   Lab Units 03/10/22  0610 03/06/22  1555   AST U/L 13 26   ALT U/L 24 32   ALK PHOS U/L 57 81   TOTAL PROTEIN g/dL 5 8* 7 3   ALBUMIN g/dL 2 8* 3 5   TOTAL BILIRUBIN mg/dL 0 47 0 31     Results from last 7 days   Lab Units 03/10/22  0610 03/09/22  0542 03/08/22  0412 03/07/22  0512 03/06/22  1555   GLUCOSE RANDOM mg/dL 82 101 117 96 95     Results from last 7 days   Lab Units 03/06/22  2330 03/06/22  2115 03/06/22 2020   HS TNI 0HR ng/L  --   --  10   HS TNI 2HR ng/L  --  11  --    HSTNI D2 ng/L  --  1  --    HS TNI 4HR ng/L 9  --   --    HSTNI D4 ng/L -1  --   --      Results from last 7 days   Lab Units 03/06/22  1555   PROTIME seconds 12 4   INR  0 94   PTT seconds 30     Results from last 7 days   Lab Units 03/08/22  0412   TSH 3RD GENERATON uIU/mL 1 380     Results from last 7 days   Lab Units 03/07/22  0513 03/06/22  1555   PROCALCITONIN ng/ml <0 05 <0 05     Results from last 7 days   Lab Units 03/06/22  1555   LACTIC ACID mmol/L 1 9     Results from last 7 days   Lab Units 03/06/22  1555   LIPASE u/L 72*     Results from last 7 days   Lab Units 03/06/22  2017   CLARITY UA  Clear   COLOR UA  Yellow   SPEC GRAV UA  1 020   PH UA  6 0   GLUCOSE UA mg/dl Negative   KETONES UA mg/dl Negative   BLOOD UA  Negative   PROTEIN UA mg/dl Negative   NITRITE UA  Negative   BILIRUBIN UA  Negative   UROBILINOGEN UA E U /dl 0 2   LEUKOCYTES UA  Negative     Results from last 7 days   Lab Units 03/08/22  0100   AMPH/METH  Negative   BARBITURATE UR  Negative   BENZODIAZEPINE UR  Positive*   COCAINE UR  Negative   METHADONE URINE  Negative   OPIATE UR  Positive*   PCP UR  Negative   THC UR  Negative     Results from last 7 days   Lab Units 03/06/22  1555   BLOOD CULTURE  No Growth at 72 hrs  No Growth at 72 hrs       ED Treatment:   Medication Administration from 03/06/2022 1524 to 03/06/2022 2046       Date/Time Order Dose Route Action     03/06/2022 1601 sodium chloride 0 9 % bolus 1,000 mL 1,000 mL Intravenous New Bag     03/06/2022 1607 HYDROmorphone (DILAUDID) injection 1 mg 1 mg Intravenous Given     03/06/2022 1713 HYDROmorphone (DILAUDID) injection 1 mg 1 mg Intravenous Given     03/06/2022 1655 adenosine (ADENOCARD) injection 6 mg 6 mg Intravenous Given     03/06/2022 1701 adenosine (ADENOCARD) injection 12 mg 12 mg Intravenous Given     03/06/2022 1710 diltiazem (CARDIZEM) injection 20 mg 20 mg Intravenous Given     03/06/2022 1724 diltiazem (CARDIZEM) 125 mg in sodium chloride 0 9 % 125 mL infusion 5 mg/hr Intravenous New Bag     03/06/2022 1705 adenosine (ADENOCARD) injection 12 mg 12 mg Intravenous Given     03/06/2022 2013 magnesium sulfate 2 g/50 mL IVPB (premix) 2 g 0 g Intravenous Stopped     03/06/2022 1810 magnesium sulfate 2 g/50 mL IVPB (premix) 2 g 2 g Intravenous New Bag     03/06/2022 1804 potassium chloride 20 mEq IVPB (premix) 20 mEq Intravenous New Bag     03/06/2022 2004 potassium chloride 20 mEq IVPB (premix) 20 mEq Intravenous New Bag     03/06/2022 1738 LORazepam (ATIVAN) injection 1 mg 1 mg Intravenous Given     03/06/2022 1826 HYDROmorphone (DILAUDID) injection 1 mg 1 mg Intravenous Given     03/06/2022 1844 vancomycin (VANCOCIN) 1,250 mg in sodium chloride 0 9 % 250 mL IVPB 1,250 mg Intravenous New Bag     03/06/2022 1819 iohexol (OMNIPAQUE) 350 MG/ML injection (SINGLE-DOSE) 100 mL 100 mL Intravenous Given     03/06/2022 1837 metoprolol (LOPRESSOR) injection 5 mg 5 mg Intravenous Given     03/06/2022 2008 metoprolol (LOPRESSOR) injection 5 mg 5 mg Intravenous Given        Past Medical History:   Diagnosis Date    [facility])     Anxiety     Bowel obstruction ([facility])     Clostridium difficile colitis 9/13/2018    Colitis     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[facility] [address] ) 9/13/2018    Pancreatitis     Ulcerative colitis ([facility] [address] )      Present on Admission:   Tachycardia   Enteritis   Polymicrobial after streptococcal bacteremia   Pouchitis ([facility])   Hypokalemia   Leukocytosis   Presumed AV endocarditis   (Resolved) Hives   Anemia      Admitting Diagnosis: SVT (supraventricular tachycardia) ([facility]) [I47 1]  Allergic reaction [T78 40XA]  History of endocarditis [Z86 79]  Intractable abdominal pain [R10 9]  Age/Sex: 27 y o  male  Admission Orders:  Scd/foot pumps  Telemetry  vanco trough  Stool enteric bacterial panel  Ova parasite exam  Lo fiber Lo residue diet  Scheduled Medications:  DULoxetine, 30 mg, Oral, Daily  famotidine, 20 mg, Intravenous, Q12H [facility]  metroNIDAZOLE, 500 mg, Oral, Q12H [facility]  vancomycin, 20 mg/kg, Intravenous, Q8H      Continuous IV Infusions:  sodium chloride, 75 mL/hr, Intravenous, Continuous      PRN Meds:  acetaminophen, 650 mg, Oral, Q6H PRN  ALPRAZolam, 0 25 mg, Oral, BID PRN  aluminum-magnesium hydroxide-simethicone, 30 mL, Oral, Q4H PRN x1  diphenhydrAMINE, 25 mg, Intravenous, Q6H PRN  docusate sodium, 100 mg, Oral, BID PRN  HYDROmorphone, 1 mg, Intravenous, Q3H PRN x10  in 24hr  metoprolol, 5 mg, Intravenous, Q6H PRN  promethazine, 12 5 mg, Intravenous, Q6H PRN x4 in 24hr        IP CONSULT TO MEDICAL CRITICAL CARE  IP CONSULT TO CARDIOLOGY  IP CONSULT TO INFECTIOUS DISEASES  IP CONSULT TO PHARMACY  IP CONSULT TO GASTROENTEROLOGY    Network Utilization Review Department  ATTENTION: Please call with any questions or concerns to [phone] and carefully listen to the prompts so that you are directed to the right person  All voicemails are confidential   Niranjan Longoria all requests for admission clinical reviews, approved or denied determinations and any other requests to dedicated fax number below belonging to the campus where the patient is receiving treatment   List of dedicated fax numbers for the Facilities:  [address] DENIALS (Administrative/Medical Necessity) [phone]   [address] (Maternity/NICU/Pediatrics) [phone]   [address]  [address] [facility] [place] [address] [id number] [place] [address] [address] [facility] [place] [address] [phone]

## 2022-03-10 NOTE — PROGRESS NOTES
Progress Note - Infectious Disease   Celestino Abad 27 y o  male MRN: [id number]  Unit/Bed#: [address] [place] [address] Encounter: [id number]      Impression/Recommendations:  1  Polymicrobial alpha streptococcal bacteremia, felt to be secondary to translocation from gut   Patient is clinically much improved   Bacteremia had cleared prior to recent discharge  Continue IV vancomycin, as in below  Treat x4 weeks total   Given loss of approximately 24 hours of treatment during this hospitalization, will extend treatment course but 24 hours, through 3/24      2  Presumptive AV endocarditis   2D echo and BUCKY were not definitive but showed possible vegetation at base of AV   No evidence of septic emboli     Antibiotic plan as in above      3  Recent coagulase-negative Staphylococcus bacteremia, felt to be secondary to contaminated blood draw     No antibiotic or further workup needed for this      4  Hives, most likely secondary to cefazolin   Hives have resolved   Will complete antibiotic course with IV vancomycin   Vancomycin was held due to concern for tachycardia secondary to it  After extensive discussion with patient's family, IV vancomycin was restarted  Patient is tolerating IV vancomycin well, with no further tachycardia  Continue  IV vancomycin       5  Recent enteritis   Patient is clinically improved, although he still has mild RUQ abdominal pain  Concern for pouchitis noted  Addition of Flagyl noted     Monitor abdominal pain  Flagyl per GI      6  UC, status post colectomy         7  Ankylosing spondylitis, on outpatient [place]      Discussed with patient in detail regarding the above plan     Okay for discharge from ID viewpoint  Rx on chart for home IV antibiotic  Follow-up with us as an outpatient, as previously planned      Antibiotics:  Vancomycin  Antibiotic # 15   Flagyl # 2      Subjective:  Patient with stable lower abdominal and rectal pain  No further hives  No palpitations    Temperature stays down   No chills  He continues to tolerate vancomycin well, with no tachycardia      Objective:  Vitals:  Temp:  [97 5 °F (36 4 °C)-98 2 °F (36 8 °C)] 97 5 °F (36 4 °C)  HR:  [73-89] 73  Resp:  [18-19] 18  BP: (126-143)/() 126/83  SpO2:  [94 %-98 %] 98 %  Temp (24hrs), Av 9 °F (36 6 °C), Min:97 5 °F (36 4 °C), Max:98 2 °F (36 8 °C)  Current: Temperature: 97 5 °F (36 4 °C)    Physical Exam:     General: Awake, alert, cooperative, no distress  Neck:  Supple  No mass  No lymphadenopathy  Lungs: Expansion symmetric, no rales, no wheezing, respirations unlabored  Heart:  Regular rate and rhythm, S1 and S2 normal, no murmur  Abdomen: Soft, nondistended, stable mild RLQ, bowel sounds active all four quadrants, no masses, no organomegaly  Extremities: No edema  No erythema/warmth  No ulcer  Nontender to palpation  Skin:  No rash  Neuro: Moves all extremities  Invasive Devices  Report    Peripherally Inserted Central Catheter Line            PICC Line 22 Right Brachial 8 days          Peripheral Intravenous Line            Peripheral IV 22 Left Arm 3 days                Labs studies:   I have personally reviewed pertinent labs  Results from last 7 days   Lab Units 03/10/22  0610 03/09/22  0542 22  0412 22  0512 22  1555   POTASSIUM mmol/L 3 6 4 4 4 2   < > 3 2*   CHLORIDE mmol/L 106 106 102   < > 105   CO2 mmol/L 27 27 27   < > 24   BUN mg/dL 7 7 7   < > 7   CREATININE mg/dL 0 93 0 88 0 96   < > 0 79   EGFR ml/min/1 73sq m 111 116 107   < > 122   CALCIUM mg/dL 7 7* 8 1* 8 3   < > 8 7   AST U/L 13  --   --   --  26   ALT U/L 24  --   --   --  32   ALK PHOS U/L 57  --   --   --  81    < > = values in this interval not displayed       Results from last 7 days   Lab Units 03/10/22  0610 22  0542 22  0412   WBC Thousand/uL 9 06 13 58* 18 05*   HEMOGLOBIN g/dL 9 2* 9 9* 10 5*   PLATELETS Thousands/uL 422* 484* 518*     Results from last 7 days   Lab Units 03/06/22  1555   BLOOD CULTURE  No Growth at 72 hrs  No Growth at 72 hrs  Imaging Studies:   I have personally reviewed pertinent imaging study reports and images in PACS  EKG, Pathology, and Other Studies:   I have personally reviewed pertinent reports

## 2022-03-10 NOTE — PROGRESS NOTES
Patient in extreme pain as per RN more than usual      Abdominal Xray ordered to ensure no free air or other abnormality  Dilaudid 1 mg stat dose ordered an hour earlier than scheduled prn     Other vitals stable d/w RN in detail  Will follow closely

## 2022-03-10 NOTE — PLAN OF CARE
Problem: Potential for Falls  Goal: Patient will remain free of falls  Description: INTERVENTIONS:  - Educate patient/family on patient safety including physical limitations  - Instruct patient to call for assistance with activity   - Consult OT/PT to assist with strengthening/mobility   - Keep Call bell within reach  - Keep bed low and locked with side rails adjusted as appropriate  - Keep care items and personal belongings within reach  - Initiate and maintain comfort rounds  - Make Fall Risk Sign visible to staff    Problem: PAIN - ADULT  Goal: Verbalizes/displays adequate comfort level or baseline comfort level  Description: Interventions:  - Encourage patient to monitor pain and request assistance  - Assess pain using appropriate pain scale  - Administer analgesics based on type and severity of pain and evaluate response  - Implement non-pharmacological measures as appropriate and evaluate response  - Consider cultural and social influences on pain and pain management  - Notify physician/advanced practitioner if interventions unsuccessful or patient reports new pain  Outcome: Progressing      Problem: INFECTION - ADULT  Goal: Absence or prevention of progression during hospitalization  Description: INTERVENTIONS:  - Assess and monitor for signs and symptoms of infection  - Monitor lab/diagnostic results  - Monitor all insertion sites, i e  indwelling lines, tubes, and drains  - Monitor endotracheal if appropriate and nasal secretions for changes in amount and color  - [place] appropriate cooling/warming therapies per order  - Administer medications as ordered  - Instruct and encourage patient and family to use good hand hygiene technique  - Identify and instruct in appropriate isolation precautions for identified infection/condition  Outcome: Progressing      Problem: INFECTION - ADULT  Goal: Absence of fever/infection during neutropenic period  Description: INTERVENTIONS:  - Monitor WBC    Outcome: Progressing      Problem: Knowledge Deficit  Goal: Patient/family/caregiver demonstrates understanding of disease process, treatment plan, medications, and discharge instructions  Description: Complete learning assessment and assess knowledge base    Interventions:  - Provide teaching at level of understanding  - Provide teaching via preferred learning methods  Outcome: Progressing     - Apply yellow socks and bracelet for high fall risk patients  - Consider moving patient to room near nurses station  Outcome: Progressing

## 2022-03-10 NOTE — PLAN OF CARE
Problem: PAIN - ADULT  Goal: Verbalizes/displays adequate comfort level or baseline comfort level  Description: Interventions:  - Encourage patient to monitor pain and request assistance  - Assess pain using appropriate pain scale  - Administer analgesics based on type and severity of pain and evaluate response  - Implement non-pharmacological measures as appropriate and evaluate response  - Consider cultural and social influences on pain and pain management  - Notify physician/advanced practitioner if interventions unsuccessful or patient reports new pain  Outcome: Progressing     Problem: INFECTION - ADULT  Goal: Absence or prevention of progression during hospitalization  Description: INTERVENTIONS:  - Assess and monitor for signs and symptoms of infection  - Monitor lab/diagnostic results  - Monitor all insertion sites, i e  indwelling lines, tubes, and drains  - Monitor endotracheal if appropriate and nasal secretions for changes in amount and color  - [place] appropriate cooling/warming therapies per order  - Administer medications as ordered  - Instruct and encourage patient and family to use good hand hygiene technique  - Identify and instruct in appropriate isolation precautions for identified infection/condition  Outcome: Progressing  Goal: Absence of fever/infection during neutropenic period  Description: INTERVENTIONS:  - Monitor WBC    Outcome: Progressing

## 2022-03-10 NOTE — PROGRESS NOTES
[address] Internal Medicine Progress Note  Patient: Thuy Snyder 27 y o  male   MRN: [id number]  PCP: Taylor iLon DO  Unit/Bed#: [address] [place] [address] Encounter: [id number]  Date Of Visit: 03/10/22    Problem List:    Active Problems:    Polymicrobial after streptococcal bacteremia    Enteritis    Pouchitis ([address] )    Presumed AV endocarditis    Anemia    Hypokalemia    Coagulase negative Staphylococcus bacteremia      Assessment & Plan:    Polymicrobial after streptococcal bacteremia  Assessment & Plan  Noted during recent hospitalization  Deemed to be secondary to GI translocation   Repeat cultures were negative  Patient was subsequently discharged on IV cefazolin but noted to have hives, presented back to the hospital  Seen by ID input appreciated  Vancomycin was held on 03/07 with concern of an allergic reaction? Possibly infusion related  Patient had received vancomycin at the time of admission and during prior hospitalization without any intolerance  Restarted IV vancomycin slow infusion which tolerated since 03/08  · Continue IV vancomycin  · Monitor for any reaction  · Plan to continue 4 weeks through 3/24  · Monitor symptoms    * Tachycardia-resolved as of 3/11/2022  Assessment & Plan  Noted to be in SVT upon arrival to ED,S/p adenosine x3 and cardizem infusion   Seen by Cardiology, input appreciated  Noted to have sinus tachycardia, low suspicion of reentry SVT  Likely secondary to allergic reaction    Recommended observation  · No further episodes  · Monitor on telemetry   · Supportive care    Presumed AV endocarditis  Assessment & Plan  Underwent 2D echo and BUCKY during recent hospitalization in setting of alpha staph bacteremia  No evidence of definitive endocarditis but noted possible vegetation versus redundant chordae at base of AV  · Continue IV vancomycin    Pouchitis (HCC)  Assessment & Plan  S/p pouchoscopy along with TTS balloon dilatation on previous admission   Biopsy with evidence of chronic active entritis  Negative for malignancy  Still with ongoing symptoms of lower abdominal discomfort and frequent bowel movement, small amount of bleeding  Appetite poor with frequent bowel movements  · Continue current management, symptomatic treatment  · GI following, input appreciated  Hydrocortisone edema discontinued  Metronidazole added   · Monitor symptoms    Enteritis  Assessment & Plan  History of UC status post colectomy and ileal pouch formation  GI evaluation appreciated  Follow-up recommendation  · Follow-up stool studies   · Flagyl added  · Symptomatic treatment      Hives-resolved as of 3/9/2022  Assessment & Plan  Likely secondary to cefazolin  Now resolved  · Monitor symptoms    Leukocytosis-resolved as of 3/11/2022  Assessment & Plan  Likely secondary to steroids  Downtrending  Monitor    Hypokalemia  Assessment & Plan  Repleted, improved    Anemia  Assessment & Plan  Chronic, start oral iron supplement  Monitor    Coagulase negative Staphylococcus bacteremia  Assessment & Plan  Noted during recent hospitalization, likely contaminant  Repeat culture has been negative          VTE Pharmacologic Prophylaxis:   Low Risk (Score 0-2) - Encourage Ambulation  Also contraindicated due to rectal bleeding    Patient Centered Rounds: I performed bedside rounds with nursing staff today  Discussions with Specialists or Other Care Team Provider:  Infectious disease, gastroenterology    Education and Discussions with Family / Patient: Patient declined call to   Time Spent for Care: 45 minutes  More than 50% of total time spent on counseling and coordination of care as described above  Current Length of Stay: 0 day(s)  Current Patient Status: Inpatient   Certification Statement: The patient will continue to require additional hospital stay due to monitor tolerance of antibiotics  Discharge Plan: Anticipate discharge in 24-48 hrs to home      Code Status: Level 1 - Full Code    Subjective:   Still with lower abdominal discomfort, reported significantly worsening of pain last night requiring additional IV pain medication, pain improved after a bowel movement   Reports small amount of intermittent bright red blood   Mild nausea, appetite fair    Denies any chest pain shortness of breath or palpitation      Objective:     Vitals:   Temp (24hrs), Av 9 °F (36 6 °C), Min:97 5 °F (36 4 °C), Max:98 2 °F (36 8 °C)    Temp:  [97 5 °F (36 4 °C)-98 2 °F (36 8 °C)] 97 8 °F (36 6 °C)  HR:  [73-89] 74  Resp:  [18-20] 20  BP: (126-139)/(83-93) 128/86  SpO2:  [94 %-99 %] 99 % on room air  Body mass index is 28 13 kg/m²  Input and Output Summary (last 24 hours): Intake/Output Summary (Last 24 hours) at 3/10/2022 1558  Last data filed at 3/10/2022 1043  Gross per 24 hour   Intake --   Output 700 ml   Net -700 ml       Physical Exam:   Physical Exam  Constitutional:       General: He is not in acute distress  HENT:      Head: Normocephalic and atraumatic  Cardiovascular:      Rate and Rhythm: Normal rate  Pulmonary:      Effort: Pulmonary effort is normal  No respiratory distress  Breath sounds: No wheezing, rhonchi or rales  Chest:      Chest wall: No tenderness  Abdominal:      General: Bowel sounds are normal  There is no distension  Palpations: Abdomen is soft  Tenderness: There is abdominal tenderness (Lower abdominal)  There is no guarding or rebound  Musculoskeletal:      Right lower leg: No edema  Left lower leg: No edema  Skin:     General: Skin is warm and dry  Findings: No rash  Neurological:      General: No focal deficit present  Mental Status: He is alert and oriented to person, place, and time  Mental status is at baseline  Cranial Nerves: No cranial nerve deficit           Additional Data:     Labs:  Results from last 7 days   Lab Units 03/10/22  0610   WBC Thousand/uL 9 06   HEMOGLOBIN g/dL 9 2*   HEMATOCRIT % 32 2* PLATELETS Thousands/uL 422*   NEUTROS PCT % 60   LYMPHS PCT % 25   MONOS PCT % 10   EOS PCT % 2     Results from last 7 days   Lab Units 03/10/22  0610   SODIUM mmol/L 140   POTASSIUM mmol/L 3 6   CHLORIDE mmol/L 106   CO2 mmol/L 27   BUN mg/dL 7   CREATININE mg/dL 0 93   ANION GAP mmol/L 7   CALCIUM mg/dL 7 7*   ALBUMIN g/dL 2 8*   TOTAL BILIRUBIN mg/dL 0 47   ALK PHOS U/L 57   ALT U/L 24   AST U/L 13   GLUCOSE RANDOM mg/dL 82     Results from last 7 days   Lab Units 03/06/22  1555   INR  0 94             Results from last 7 days   Lab Units 03/07/22  0513 03/06/22  1555   LACTIC ACID mmol/L  --  1 9   PROCALCITONIN ng/ml <0 05 <0 05       Lines/Drains:  Invasive Devices  Report    Peripherally Inserted Central Catheter Line            PICC Line 03/01/22 Right Brachial 9 days          Peripheral Intravenous Line            Peripheral IV 03/06/22 Left Arm 4 days                Central Line:  Goal for removal: N/A - Discharging with PICC for IV ABX/medications         Telemetry:  Telemetry Orders (From admission, onward)             48 Hour Telemetry Monitoring  Continuous x 48 hours        References:    Telemetry Guidelines   Question:  Reason for 48 Hour Telemetry  Answer:  Arrhythmias Requiring Medical Therapy (eg  SVT, Vtach/fib, Bradycardia, Uncontrolled A-fib)                 Telemetry Reviewed: Normal Sinus Rhythm no further episodes of sinus tachycardia  Indication for Continued Telemetry Use: Arrthymias requiring medical therapy             Imaging: Reviewed radiology reports from this admission including: chest CT scan    Recent Cultures (last 7 days):   Results from last 7 days   Lab Units 03/06/22  1555   BLOOD CULTURE  No Growth at 72 hrs  No Growth at 72 hrs         Last 24 Hours Medication List:   Current Facility-Administered Medications   Medication Dose Route Frequency Provider Last Rate    acetaminophen  650 mg Oral Q6H PRN Jitendra Solis DO      ALPRAZolam  0 25 mg Oral BID PRN Jitendra Manzano DO      aluminum-magnesium hydroxide-simethicone  30 mL Oral Q4H PRN Darlin Revankar, DO      diphenhydrAMINE  25 mg Intravenous Q6H PRN Jitendra Solis, DO      docusate sodium  100 mg Oral BID PRN Monica Ferro MD      DULoxetine  30 mg Oral Daily Jitendra Solis, DO      famotidine  20 mg Intravenous Q12H [address] Monica Ferro MD      HYDROmorphone  1 mg Intravenous Q3H PRN Jitendra Solis, DO      metoprolol  5 mg Intravenous Q6H PRN Jitendra Solis, DO      metroNIDAZOLE  500 mg Oral Q12H [address] [place]      promethazine  12 5 mg Intravenous Q6H PRN Monica Ferro MD      saccharomyces boulardii  250 mg Oral BID Alfonza Manuel, [place]      sodium chloride  75 mL/hr Intravenous Continuous Monica Ferro MD 75 mL/hr (03/09/22 1512)    vancomycin  20 mg/kg Intravenous Q8H Luiz Wheat MD 1,750 mg (03/10/22 1033)        Today, Patient Was Seen By: Monica Ferro MD    ** Please Note: "This note has been constructed using a voice recognition system  Therefore there may be syntax, spelling, and/or grammatical errors   Please call if you have any questions  "**

## 2022-03-10 NOTE — PROGRESS NOTES
Progress Note- Tc Collins 27 y o  male MRN: [id number]    Unit/Bed#: [address] [facility]- Encounter: [id number]      Assessment and Plan:    1  Pouchitis  Presuming his initial diagnosis of ulcerative colitis was correct, the inflammatory changes noted on endoscopic pouchoscopy likely represent acute pouchitis  This would typically be treated with a course of antibiotics such as ciprofloxacin and/or metronidazole for several weeks  He reports that he has been treated with this combination in the past for prior episode of pouchitis in that his symptoms currently are similar  Patient currently on vancomycin for treatment of streptococcal bacteremia  Would recommend adding metronidazole 500 mg p o  B i d  For 14 days  Hydrocortisone enemas were discontinued  Other infectious etiologies including Clostridium difficile are also possible, C diff stool was negative last admission  Enteric stool panel/O&P pending  Less likely, this could represent Crohn's disease rather than ulcerative colitis  If symptoms are not improving in the next several weeks, would recommend outpatient MR enterography to evaluate for more proximal small bowel inflammatory changes  Will trial Bentyl and probiotic     ______________________________________________________________________    Subjective:     Patient reports continued urgency, frequent bowel movements with intermittent rectal bleeding, and associated sharp umbilical pain  Pain pain medication & bowel movements helps relieve pain  Reports it feels like his ulcerative colitis all over again  He ate some of his lunch  Denies any nausea or vomiting       Medication Administration - last 24 hours from 03/09/2022 1344 to 03/10/2022 1344         Date/Time Order Dose Route Action Action by     03/10/2022 0919 DULoxetine (CYMBALTA) delayed release capsule 30 mg 30 mg Oral Given Stephanie Mojica RN     03/09/2022 2643 aluminum-magnesium hydroxide-simethicone (MYLANTA) oral suspension 30 mL 30 mL Oral Given Helen Vargas, RN     03/10/2022 1220 HYDROmorphone (DILAUDID) injection 1 mg 1 mg Intravenous Given [facility], RN     03/10/2022 0919 HYDROmorphone (DILAUDID) injection 1 mg 1 mg Intravenous Given [facility], RN     03/10/2022 4094 HYDROmorphone (DILAUDID) injection 1 mg 1 mg Intravenous Given [facility], RN     03/10/2022 0302 HYDROmorphone (DILAUDID) injection 1 mg 1 mg Intravenous Given [facility], RN     03/09/2022 2337 HYDROmorphone (DILAUDID) injection 1 mg 1 mg Intravenous Given [facility], RN     03/09/2022 1813 HYDROmorphone (DILAUDID) injection 1 mg 1 mg Intravenous Given Helen Alicia, RN     03/09/2022 1512 HYDROmorphone (DILAUDID) injection 1 mg 1 mg Intravenous Given Helen Vargas, RN     03/09/2022 1512 sodium chloride 0 9 % infusion 75 mL/hr Intravenous Rate/Dose Change Helen Vargas, RN     03/10/2022 0919 famotidine (PEPCID) injection 20 mg 20 mg Intravenous Given [facility], RN     03/09/2022 2025 famotidine (PEPCID) injection 20 mg 20 mg Intravenous Given [facility], RN     03/10/2022 0919 promethazine (PHENERGAN) injection 12 5 mg 12 5 mg Intravenous Given [facility], RN     03/10/2022 0301 promethazine (PHENERGAN) injection 12 5 mg 12 5 mg Intravenous Given [facility], RN     03/09/2022 2025 promethazine (PHENERGAN) injection 12 5 mg 12 5 mg Intravenous Given [facility], RN     03/09/2022 1747 vancomycin (VANCOCIN) 1,250 mg in sodium chloride 0 9 % 250 mL IVPB 1,250 mg Intravenous Not Given Helen Vargas, RN     03/10/2022 0920 metroNIDAZOLE (FLAGYL) tablet 500 mg 500 mg Oral Given [facility], RN     03/09/2022 2044 metroNIDAZOLE (FLAGYL) tablet 500 mg 500 mg Oral Given [facility], RN     03/09/2022 1806 vancomycin (VANCOCIN) 1,750 mg in sodium chloride 0 9 % 500 mL IVPB 1,750 mg Intravenous New Bag Helen Alicia, [place]     03/09/2022 1911 ketorolac (TORADOL) injection 15 mg 15 mg Intravenous Given Dewayne Harman RN     03/10/2022 1033 vancomycin (VANCOCIN) 1,750 mg in sodium chloride 0 9 % 500 mL IVPB 1,750 mg Intravenous [address] Trell Rodas RN     03/10/2022 0230 vancomycin (VANCOCIN) 1,750 mg in sodium chloride 0 9 % 500 mL IVPB 1,750 mg Intravenous 300 Kj Darby RN     03/09/2022 2025 HYDROmorphone (DILAUDID) injection 1 mg 1 mg Intravenous Given Radha Erwin RN            Objective:     Vitals: Blood pressure 126/83, pulse 73, temperature 97 5 °F (36 4 °C), temperature source Oral, resp  rate 18, height 5' 9" (1 753 m), weight 86 4 kg (190 lb 7 6 oz), SpO2 98 %  ,Body mass index is 28 13 kg/m²  Intake/Output Summary (Last 24 hours) at 3/10/2022 1344  Last data filed at 3/10/2022 1043  Gross per 24 hour   Intake --   Output 700 ml   Net -700 ml       Physical Exam:   General Appearance: Awake and alert, in no acute distress  Abdomen: Soft, tender at area of scarring underneath umbilicus, non-distended; bowel sounds normal; no masses or no organomegaly    Invasive Devices  Report      Peripherally Inserted Central Catheter Line              PICC Line 03/01/22 Right Brachial 9 days              Peripheral Intravenous Line              Peripheral IV 03/06/22 Left Arm 3 days                    Lab Results:  No results displayed because visit has over 200 results  Imaging Studies: I have personally reviewed pertinent imaging studies

## 2022-03-11 PROBLEM — R00.0 TACHYCARDIA: Status: RESOLVED | Noted: 2022-03-06 | Resolved: 2022-03-11

## 2022-03-11 PROBLEM — D72.829 LEUKOCYTOSIS: Status: RESOLVED | Noted: 2019-01-05 | Resolved: 2022-03-11

## 2022-03-11 PROBLEM — E87.6 HYPOKALEMIA: Status: RESOLVED | Noted: 2022-03-06 | Resolved: 2022-03-11

## 2022-03-11 LAB
ANION GAP SERPL CALCULATED.3IONS-SCNC: 6 MMOL/L (ref 4–13)
BUN SERPL-MCNC: 7 MG/DL (ref 5–25)
CALCIUM SERPL-MCNC: 8 MG/DL (ref 8.3–10.1)
CHLORIDE SERPL-SCNC: 104 MMOL/L (ref 100–108)
CO2 SERPL-SCNC: 30 MMOL/L (ref 21–32)
CREAT SERPL-MCNC: 0.95 MG/DL (ref 0.6–1.3)
CRP SERPL QL: 5.6 MG/L
ERYTHROCYTE [DISTWIDTH] IN BLOOD BY AUTOMATED COUNT: 17.2 % (ref 11.6–15.1)
GFR SERPL CREATININE-BSD FRML MDRD: 108 ML/MIN/1.73SQ M
GLUCOSE SERPL-MCNC: 78 MG/DL (ref 65–140)
HCT VFR BLD AUTO: 32.9 % (ref 36.5–49.3)
HGB BLD-MCNC: 9.7 G/DL (ref 12–17)
MCH RBC QN AUTO: 19.2 PG (ref 26.8–34.3)
MCHC RBC AUTO-ENTMCNC: 29.5 G/DL (ref 31.4–37.4)
MCV RBC AUTO: 65 FL (ref 82–98)
PLATELET # BLD AUTO: 447 THOUSANDS/UL (ref 149–390)
PMV BLD AUTO: 8.6 FL (ref 8.9–12.7)
POTASSIUM SERPL-SCNC: 3.7 MMOL/L (ref 3.5–5.3)
RBC # BLD AUTO: 5.04 MILLION/UL (ref 3.88–5.62)
SODIUM SERPL-SCNC: 140 MMOL/L (ref 136–145)
WBC # BLD AUTO: 9.36 THOUSAND/UL (ref 4.31–10.16)

## 2022-03-11 PROCEDURE — 99232 SBSQ HOSP IP/OBS MODERATE 35: CPT | Performed by: INTERNAL MEDICINE

## 2022-03-11 PROCEDURE — 85027 COMPLETE CBC AUTOMATED: CPT | Performed by: INTERNAL MEDICINE

## 2022-03-11 PROCEDURE — 99232 SBSQ HOSP IP/OBS MODERATE 35: CPT | Performed by: NURSE PRACTITIONER

## 2022-03-11 PROCEDURE — 80048 BASIC METABOLIC PNL TOTAL CA: CPT | Performed by: INTERNAL MEDICINE

## 2022-03-11 PROCEDURE — 86140 C-REACTIVE PROTEIN: CPT | Performed by: INTERNAL MEDICINE

## 2022-03-11 RX ORDER — ONDANSETRON 4 MG/1
4 TABLET, ORALLY DISINTEGRATING ORAL EVERY 6 HOURS PRN
Status: DISCONTINUED | OUTPATIENT
Start: 2022-03-11 | End: 2022-03-12 | Stop reason: HOSPADM

## 2022-03-11 RX ORDER — ONDANSETRON 2 MG/ML
4 INJECTION INTRAMUSCULAR; INTRAVENOUS EVERY 6 HOURS PRN
Status: DISCONTINUED | OUTPATIENT
Start: 2022-03-11 | End: 2022-03-11

## 2022-03-11 RX ORDER — HYDROMORPHONE HYDROCHLORIDE 2 MG/1
2 TABLET ORAL EVERY 6 HOURS PRN
Status: DISCONTINUED | OUTPATIENT
Start: 2022-03-11 | End: 2022-03-12 | Stop reason: HOSPADM

## 2022-03-11 RX ORDER — HYDROMORPHONE HCL/PF 1 MG/ML
1 SYRINGE (ML) INJECTION ONCE AS NEEDED
Status: COMPLETED | OUTPATIENT
Start: 2022-03-11 | End: 2022-03-11

## 2022-03-11 RX ADMIN — HYDROMORPHONE HYDROCHLORIDE 1 MG: 1 INJECTION, SOLUTION INTRAMUSCULAR; INTRAVENOUS; SUBCUTANEOUS at 21:00

## 2022-03-11 RX ADMIN — VANCOMYCIN HYDROCHLORIDE 1250 MG: 10 INJECTION, POWDER, LYOPHILIZED, FOR SOLUTION INTRAVENOUS at 05:58

## 2022-03-11 RX ADMIN — SODIUM CHLORIDE 75 ML/HR: 9 INJECTION, SOLUTION INTRAVENOUS at 04:20

## 2022-03-11 RX ADMIN — HYDROMORPHONE HYDROCHLORIDE 1 MG: 1 INJECTION, SOLUTION INTRAMUSCULAR; INTRAVENOUS; SUBCUTANEOUS at 04:16

## 2022-03-11 RX ADMIN — HYDROMORPHONE HYDROCHLORIDE 1 MG: 1 INJECTION, SOLUTION INTRAMUSCULAR; INTRAVENOUS; SUBCUTANEOUS at 12:48

## 2022-03-11 RX ADMIN — ONDANSETRON 4 MG: 2 INJECTION INTRAMUSCULAR; INTRAVENOUS at 04:23

## 2022-03-11 RX ADMIN — PROMETHAZINE HYDROCHLORIDE 12.5 MG: 25 INJECTION INTRAMUSCULAR; INTRAVENOUS at 01:06

## 2022-03-11 RX ADMIN — VANCOMYCIN HYDROCHLORIDE 1250 MG: 10 INJECTION, POWDER, LYOPHILIZED, FOR SOLUTION INTRAVENOUS at 21:00

## 2022-03-11 RX ADMIN — METRONIDAZOLE 500 MG: 500 TABLET ORAL at 20:04

## 2022-03-11 RX ADMIN — VANCOMYCIN HYDROCHLORIDE 1250 MG: 10 INJECTION, POWDER, LYOPHILIZED, FOR SOLUTION INTRAVENOUS at 14:16

## 2022-03-11 RX ADMIN — DICYCLOMINE HYDROCHLORIDE 10 MG: 10 CAPSULE ORAL at 06:00

## 2022-03-11 RX ADMIN — HYDROMORPHONE HYDROCHLORIDE 1 MG: 1 INJECTION, SOLUTION INTRAMUSCULAR; INTRAVENOUS; SUBCUTANEOUS at 08:45

## 2022-03-11 RX ADMIN — DICYCLOMINE HYDROCHLORIDE 10 MG: 10 CAPSULE ORAL at 11:19

## 2022-03-11 RX ADMIN — DICYCLOMINE HYDROCHLORIDE 10 MG: 10 CAPSULE ORAL at 21:00

## 2022-03-11 RX ADMIN — Medication 250 MG: at 17:32

## 2022-03-11 RX ADMIN — ONDANSETRON 4 MG: 4 TABLET, ORALLY DISINTEGRATING ORAL at 18:44

## 2022-03-11 RX ADMIN — HYDROMORPHONE HYDROCHLORIDE 2 MG: 2 TABLET ORAL at 17:32

## 2022-03-11 RX ADMIN — Medication 250 MG: at 08:44

## 2022-03-11 RX ADMIN — DICYCLOMINE HYDROCHLORIDE 10 MG: 10 CAPSULE ORAL at 17:32

## 2022-03-11 RX ADMIN — DULOXETINE HYDROCHLORIDE 30 MG: 30 CAPSULE, DELAYED RELEASE ORAL at 08:44

## 2022-03-11 RX ADMIN — POLYSACCHARIDE-IRON COMPLEX 150 MG: 150 CAPSULE ORAL at 08:44

## 2022-03-11 RX ADMIN — METRONIDAZOLE 500 MG: 500 TABLET ORAL at 08:44

## 2022-03-11 RX ADMIN — HYDROMORPHONE HYDROCHLORIDE 1 MG: 1 INJECTION, SOLUTION INTRAMUSCULAR; INTRAVENOUS; SUBCUTANEOUS at 01:06

## 2022-03-11 RX ADMIN — ACETAMINOPHEN 650 MG: 325 TABLET, FILM COATED ORAL at 11:19

## 2022-03-11 NOTE — PROGRESS NOTES
Progress Note - Infectious Disease   Rodrick Miguel 27 y o  male MRN: [id number]  Unit/Bed#: [address] [place] [address] Encounter: [id number]      Impression/Recommendations:  1  Polymicrobial alpha streptococcal bacteremia, felt to be secondary to translocation from gut   Patient is clinically much improved   Bacteremia had cleared prior to recent discharge  Continue IV vancomycin, as in below  Treat x4 weeks total, through 3/24      2  Presumptive AV endocarditis   2D echo and BUCKY were not definitive but showed possible vegetation at base of AV   No evidence of septic emboli     Antibiotic plan as in above      3  Recent coagulase-negative Staphylococcus bacteremia, felt to be secondary to contaminated blood draw     No antibiotic or further workup needed for this      4  Hives, most likely secondary to cefazolin   Hives have resolved   Will complete antibiotic course with IV vancomycin   Patient is tolerating IV vancomycin well, with no further tachycardia  Continue  IV vancomycin       5  Enteritis/pouchitis  Patient with stable lower abdominal and rectal discomfort  Addition of Flagyl noted     Monitor abdominal pain  Flagyl per GI  GI follow-up      6  UC, status post colectomy         7  Ankylosing spondylitis, on outpatient [facility]      Discussed with patient in detail regarding the above plan     Discussed with Dr Fernando Mascorro from [facility] service [facility] for discharge from ID viewpoint  Rx on chart for home IV antibiotic  Follow-up with us as an outpatient, as previously planned      Antibiotics:  Vancomycin  Antibiotic # 16   Flagyl # 3      Subjective:  Patient with stable lower abdominal and rectal pain/discomfort  No further hives  No palpitations  Temperature stays down   No chills    He continues to tolerate vancomycin well, with no tachycardia      Objective:  Vitals:  Temp:  [97 8 °F (36 6 °C)-98 1 °F (36 7 °C)] 98 1 °F (36 7 °C)  HR:  [68-74] 68  Resp:  [11-20] 18  BP: (128-136)/(86-89) 136/88  SpO2:  [95 %-99 %] 96 %  Temp (24hrs), Av °F (36 7 °C), Min:97 8 °F (36 6 °C), Max:98 1 °F (36 7 °C)  Current: Temperature: 98 1 °F (36 7 °C)    Physical Exam:     General: Awake, alert, cooperative, no distress  Neck:  Supple  No mass  No lymphadenopathy  Lungs: Expansion symmetric, no rales, no wheezing, respirations unlabored  Heart:  Regular rate and rhythm, S1 and S2 normal, no murmur  Abdomen: Soft, nondistended, stable mild lower abdominal tenderness, bowel sounds active all four quadrants, no masses, no organomegaly  Extremities: No edema  No erythema/warmth  No ulcer  Nontender to palpation  Skin:  No rash  Neuro: Moves all extremities  Invasive Devices  Report    Peripherally Inserted Central Catheter Line            PICC Line 22 Right Brachial 9 days                Labs studies:   I have personally reviewed pertinent labs  Results from last 7 days   Lab Units 22  0557 03/10/22  0610 22  0542 22  0512 22  1555   POTASSIUM mmol/L 3 7 3 6 4 4   < > 3 2*   CHLORIDE mmol/L 104 106 106   < > 105   CO2 mmol/L 30 27 27   < > 24   BUN mg/dL 7 7 7   < > 7   CREATININE mg/dL 0 95 0 93 0 88   < > 0 79   EGFR ml/min/1 73sq m 108 111 116   < > 122   CALCIUM mg/dL 8 0* 7 7* 8 1*   < > 8 7   AST U/L  --  13  --   --  26   ALT U/L  --  24  --   --  32   ALK PHOS U/L  --  57  --   --  81    < > = values in this interval not displayed  Results from last 7 days   Lab Units 22  0557 03/10/22  0610 22  0542   WBC Thousand/uL 9 36 9 06 13 58*   HEMOGLOBIN g/dL 9 7* 9 2* 9 9*   PLATELETS Thousands/uL 447* 422* 484*     Results from last 7 days   Lab Units 22  1555   BLOOD CULTURE  No Growth After 4 Days  No Growth After 4 Days  Imaging Studies:   I have personally reviewed pertinent imaging study reports and images in PACS  EKG, Pathology, and Other Studies:   I have personally reviewed pertinent reports

## 2022-03-11 NOTE — PLAN OF CARE
Problem: PAIN - ADULT  Goal: Verbalizes/displays adequate comfort level or baseline comfort level  Description: Interventions:  - Encourage patient to monitor pain and request assistance  - Assess pain using appropriate pain scale  - Administer analgesics based on type and severity of pain and evaluate response  - Implement non-pharmacological measures as appropriate and evaluate response  - Consider cultural and social influences on pain and pain management  - Notify physician/advanced practitioner if interventions unsuccessful or patient reports new pain  Outcome: Progressing     Problem: INFECTION - ADULT  Goal: Absence or prevention of progression during hospitalization  Description: INTERVENTIONS:  - Assess and monitor for signs and symptoms of infection  - Monitor lab/diagnostic results  - Monitor all insertion sites, i e  indwelling lines, tubes, and drains  - Monitor endotracheal if appropriate and nasal secretions for changes in amount and color  - [place] appropriate cooling/warming therapies per order  - Administer medications as ordered  - Instruct and encourage patient and family to use good hand hygiene technique  - Identify and instruct in appropriate isolation precautions for identified infection/condition  Outcome: Progressing  Goal: Absence of fever/infection during neutropenic period  Description: INTERVENTIONS:  - Monitor WBC    Outcome: Progressing

## 2022-03-11 NOTE — PROGRESS NOTES
Vancomycin IV Pharmacy-to-Dose Consultation    Kervin Gar is a 27 y o  male who is currently receiving Vancomycin IV with management by the Pharmacy Consult service  Assessment/Plan:  The patient was reviewed  Renal function is stable and no signs or symptoms of nephrotoxicity and/or infusion reactions were documented in the chart  Based on todays assessment, continue current vancomycin (day # 6) dosing of 1250 mg IV q8h, with a plan for trough to be drawn at 13:30 on 3/12/2022  We will continue to follow the patients culture results and clinical progress daily      Modesto Corral, Pharmacist

## 2022-03-11 NOTE — PLAN OF CARE
Problem: Potential for Falls  Goal: Patient will remain free of falls  Description: INTERVENTIONS:  - Educate patient/family on patient safety including physical limitations  - Instruct patient to call for assistance with activity   - Consult OT/PT to assist with strengthening/mobility   - Keep Call bell within reach  - Keep bed low and locked with side rails adjusted as appropriate  - Keep care items and personal belongings within reach  - Initiate and maintain comfort rounds  - Make Fall Risk Sign visible to staff  - Offer Toileting every 2 Hours, in advance of need  - Initiate/Maintain bedalarm  - Obtain necessary fall risk management equipment: call bell   - Apply yellow socks and bracelet for high fall risk patients  - Consider moving patient to room near nurses station  Outcome: Progressing     Problem: PAIN - ADULT  Goal: Verbalizes/displays adequate comfort level or baseline comfort level  Description: Interventions:  - Encourage patient to monitor pain and request assistance  - Assess pain using appropriate pain scale  - Administer analgesics based on type and severity of pain and evaluate response  - Implement non-pharmacological measures as appropriate and evaluate response  - Consider cultural and social influences on pain and pain management  - Notify physician/advanced practitioner if interventions unsuccessful or patient reports new pain  Outcome: Progressing     Problem: INFECTION - ADULT  Goal: Absence or prevention of progression during hospitalization  Description: INTERVENTIONS:  - Assess and monitor for signs and symptoms of infection  - Monitor lab/diagnostic results  - Monitor all insertion sites, i e  indwelling lines, tubes, and drains  - Monitor endotracheal if appropriate and nasal secretions for changes in amount and color  - [place] appropriate cooling/warming therapies per order  - Administer medications as ordered  - Instruct and encourage patient and family to use good hand hygiene technique  - Identify and instruct in appropriate isolation precautions for identified infection/condition  Outcome: Progressing  Goal: Absence of fever/infection during neutropenic period  Description: INTERVENTIONS:  - Monitor WBC    Outcome: Progressing     Problem: SAFETY ADULT  Goal: Patient will remain free of falls  Description: INTERVENTIONS:  - Educate patient/family on patient safety including physical limitations  - Instruct patient to call for assistance with activity   - Consult OT/PT to assist with strengthening/mobility   - Keep Call bell within reach  - Keep bed low and locked with side rails adjusted as appropriate  - Keep care items and personal belongings within reach  - Initiate and maintain comfort rounds  - Make Fall Risk Sign visible to staff  - Offer Toileting every 2 Hours, in advance of need  - Initiate/Maintain bed alarm  - Obtain necessary fall risk management equipment: call bell   - Apply yellow socks and bracelet for high fall risk patients  - Consider moving patient to room near nurses station  Outcome: Progressing  Goal: Maintain or return to baseline ADL function  Description: INTERVENTIONS:  -  Assess patient's ability to carry out ADLs; assess patient's baseline for ADL function and identify physical deficits which impact ability to perform ADLs (bathing, care of mouth/teeth, toileting, grooming, dressing, etc )  - Assess/evaluate cause of self-care deficits   - Assess range of motion  - Assess patient's mobility; develop plan if impaired  - Assess patient's need for assistive devices and provide as appropriate  - Encourage maximum independence but intervene and supervise when necessary  - Involve family in performance of ADLs  - Assess for home care needs following discharge   - Consider OT consult to assist with ADL evaluation and planning for discharge  - Provide patient education as appropriate  Outcome: Progressing  Goal: Maintains/Returns to pre admission functional level  Description: INTERVENTIONS:  - Perform BMAT or MOVE assessment daily    - Set and communicate daily mobility goal to care team and patient/family/caregiver  - Collaborate with rehabilitation services on mobility goals if consulted  - Perform Range of Motion 3 times a day  - Reposition patient every 3 hours  - Dangle patient 3 times a day  - Stand patient 3 times a day  - Ambulate patient 3 times a day  - Out of bed to chair 3 times a day   - Out of bed for meals 3 times a day  - Out of bed for toileting  - Record patient progress and toleration of activity level   Outcome: Progressing     Problem: DISCHARGE PLANNING  Goal: Discharge to home or other facility with appropriate resources  Description: INTERVENTIONS:  - Identify barriers to discharge w/patient and caregiver  - Arrange for needed discharge resources and transportation as appropriate  - Identify discharge learning needs (meds, wound care, etc )  - Arrange for interpretive services to assist at discharge as needed  - Refer to Case Management Department for coordinating discharge planning if the patient needs post-hospital services based on physician/advanced practitioner order or complex needs related to functional status, cognitive ability, or social support system  Outcome: Progressing     Problem: Knowledge Deficit  Goal: Patient/family/caregiver demonstrates understanding of disease process, treatment plan, medications, and discharge instructions  Description: Complete learning assessment and assess knowledge base    Interventions:  - Provide teaching at level of understanding  - Provide teaching via preferred learning methods  Outcome: Progressing

## 2022-03-11 NOTE — PROGRESS NOTES
Pastoral Care Progress Note    3/11/2022  Patient: Gary Bansal : 1995  Admission Date & Time: 3/6/2022 1529  MRN: [id number] [facility]: [id number]                     Chaplaincy Interventions Utilized:     Chaplaincy Outcomes Achieved:  Declined  support     22 1100   Clinical Encounter Type   Visited With Patient   Routine Visit Introduction   Referral To   (census/rounds)

## 2022-03-11 NOTE — PROGRESS NOTES
[address] Internal Medicine Progress Note  Patient: Tatiana Jamil 27 y o  male   MRN: [id number]  PCP: Carlos Manuel Olivia DO  Unit/Bed#: [address] [place] [address] Encounter: [id number]  Date Of Visit: 03/11/22    Problem List:    Principal Problem:    Polymicrobial after streptococcal bacteremia  Active Problems:    Enteritis    Pouchitis ([facility] [address] )    Presumed AV endocarditis    Anemia    Coagulase negative Staphylococcus bacteremia      Assessment & Plan:    * Polymicrobial after streptococcal bacteremia  Assessment & Plan  Noted during recent hospitalization  Deemed to be secondary to GI translocation   Repeat cultures were negative  Patient was subsequently discharged on IV cefazolin but noted to have hives, presented back to the hospital  Seen by ID input appreciated  Vancomycin was held on 03/07 with concern of an allergic reaction? Possibly infusion related  Patient had received vancomycin at the time of admission and during prior hospitalization without any intolerance  Restarted IV vancomycin , initially slow infusion which was gradually increased  Patient is tolerating without any intolerance since 03/08  · Continue IV vancomycin  · Monitor for any reaction  · Plan to continue 4 weeks through 3/24  · Monitor symptoms    Tachycardia-resolved as of 3/11/2022  Assessment & Plan  Noted to be in SVT upon arrival to ED,S/p adenosine x3 and cardizem infusion   Seen by Cardiology, input appreciated  Noted to have sinus tachycardia, low suspicion of reentry SVT  Likely secondary to allergic reaction    Recommended observation  · No further episodes  · Monitor on telemetry   · Supportive care    Presumed AV endocarditis  Assessment & Plan  Underwent 2D echo and BUCKY during recent hospitalization in setting of alpha staph bacteremia  No evidence of definitive endocarditis but noted possible vegetation versus redundant chordae at base of AV  · Continue IV vancomycin    Pouchitis ([facility] [address] )  Assessment & Plan  S/p pouchoscopy along with TTS balloon dilatation on previous admission   Biopsy with evidence of chronic active entritis  Negative for malignancy  Still with mild nausea, intermittent lower abdominal discomfort and diarrhea but appears to be improving  Tolerating diet better  · GI following, input appreciated  Hydrocortisone enema discontinued  Metronidazole added   · Continue metronidazole for 2 weeks, continue Bentyl, probiotic  · Symptomatic treatment, transition to p o  Antiemetics and pain medication  Discuss about limiting appears patient is agreeable  · Follow-up with GI and [facility] after discharge    Enteritis  Assessment & Plan  History of UC status post colectomy and ileal pouch formation  GI evaluation appreciated  Follow-up recommendation  · Follow-up stool studies -negative  Stool C diff and stool doses were negative in prior admission  CRP is low  · Flagyl added, continue b i d  For 2 weeks  · Continue Bentyl, probiotic  · Symptomatic treatment  · GI follow-up after discharge      Hives-resolved as of 3/9/2022  Assessment & Plan  Likely secondary to cefazolin  Now resolved  · Monitor symptoms    Leukocytosis-resolved as of 3/11/2022  Assessment & Plan  Likely secondary to steroids  Downtrending  Monitor    Anemia  Assessment & Plan  Chronic, started oral iron supplement  Remains stable now  Monitor    Hypokalemia-resolved as of 3/11/2022  Assessment & Plan  Repleted, improved    Coagulase negative Staphylococcus bacteremia  Assessment & Plan  Noted during recent hospitalization, likely contaminant  Repeat culture has been negative          VTE Pharmacologic Prophylaxis:   Low Risk (Score 0-2) - Encourage Ambulation  Also contraindicated due to rectal bleeding    Patient Centered Rounds: I performed bedside rounds with nursing staff today  Discussions with Specialists or Other Care Team Provider:   gastroenterology    Education and Discussions with Family / Patient: Updated  (father) at bedside      Time Spent for Care: 45 minutes  More than 50% of total time spent on counseling and coordination of care as described above  Current Length of Stay: 1 day(s)  Current Patient Status: Inpatient   Certification Statement: The patient will continue to require additional hospital stay due to transition to oral medication  Discharge Plan: Anticipate discharge later today or tomorrow to home  Code Status: Level 1 - Full Code    Subjective:   Overall feels better, looking forward to go home so  Tolerating vancomycin without any difficulties  Abdominal pain is stable  Nausea is better, tolerating diet    Agreeable for transition to oral medication      Objective:     Vitals:   Temp (24hrs), Av 2 °F (36 8 °C), Min:98 °F (36 7 °C), Max:98 5 °F (36 9 °C)    Temp:  [98 °F (36 7 °C)-98 5 °F (36 9 °C)] 98 2 °F (36 8 °C)  HR:  [68-79] 72  Resp:  [11-18] 18  BP: (127-140)/(76-96) 127/76  SpO2:  [95 %-100 %] 100 % on room air  Body mass index is 28 13 kg/m²  Input and Output Summary (last 24 hours): Intake/Output Summary (Last 24 hours) at 3/11/2022 1725  Last data filed at 3/11/2022 1201  Gross per 24 hour   Intake 380 ml   Output 900 ml   Net -520 ml       Physical Exam:   Physical Exam  Constitutional:       General: He is not in acute distress  HENT:      Head: Normocephalic and atraumatic  Cardiovascular:      Rate and Rhythm: Normal rate  Pulmonary:      Effort: Pulmonary effort is normal  No respiratory distress  Breath sounds: No wheezing, rhonchi or rales  Chest:      Chest wall: No tenderness  Abdominal:      General: Bowel sounds are normal  There is no distension  Palpations: Abdomen is soft  Tenderness: There is abdominal tenderness (Minimal lower abdominal)  There is no guarding or rebound  Musculoskeletal:      Comments: Right arm PICC line   Skin:     General: Skin is warm and dry  Findings: No rash  Neurological:      Mental Status: He is alert   Mental status is at baseline  Cranial Nerves: No cranial nerve deficit  Additional Data:     Labs:  Results from last 7 days   Lab Units 03/11/22  0557 03/10/22  0610 03/10/22  0610   WBC Thousand/uL 9 36   < > 9 06   HEMOGLOBIN g/dL 9 7*   < > 9 2*   HEMATOCRIT % 32 9*   < > 32 2*   PLATELETS Thousands/uL 447*   < > 422*   NEUTROS PCT %  --   --  60   LYMPHS PCT %  --   --  25   MONOS PCT %  --   --  10   EOS PCT %  --   --  2    < > = values in this interval not displayed  Results from last 7 days   Lab Units 03/11/22  0557 03/10/22  0610 03/10/22  0610   SODIUM mmol/L 140   < > 140   POTASSIUM mmol/L 3 7   < > 3 6   CHLORIDE mmol/L 104   < > 106   CO2 mmol/L 30   < > 27   BUN mg/dL 7   < > 7   CREATININE mg/dL 0 95   < > 0 93   ANION GAP mmol/L 6   < > 7   CALCIUM mg/dL 8 0*   < > 7 7*   ALBUMIN g/dL  --   --  2 8*   TOTAL BILIRUBIN mg/dL  --   --  0 47   ALK PHOS U/L  --   --  57   ALT U/L  --   --  24   AST U/L  --   --  13   GLUCOSE RANDOM mg/dL 78   < > 82    < > = values in this interval not displayed  Results from last 7 days   Lab Units 03/06/22  1555   INR  0 94             Results from last 7 days   Lab Units 03/07/22  0513 03/06/22  1555   LACTIC ACID mmol/L  --  1 9   PROCALCITONIN ng/ml <0 05 <0 05       Lines/Drains:  Invasive Devices  Report    Peripherally Inserted Central Catheter Line            PICC Line 03/01/22 Right Brachial 10 days                Central Line:  Goal for removal: N/A - Discharging with PICC for IV ABX/medications                Imaging: Reviewed radiology reports from this admission including: chest CT scan    Recent Cultures (last 7 days):   Results from last 7 days   Lab Units 03/06/22  1555   BLOOD CULTURE  No Growth After 4 Days  No Growth After 4 Days         Last 24 Hours Medication List:   Current Facility-Administered Medications   Medication Dose Route Frequency Provider Last Rate    acetaminophen  650 mg Oral Q6H PRN Jitendra Solis DO      aluminum-magnesium hydroxide-simethicone  30 mL Oral Q4H PRN Darlin Revankar, DO      dicyclomine  10 mg Oral 4x Daily (AC & HS) [place] Garcia      diphenhydrAMINE  25 mg Intravenous Q6H PRN Jitendra Solis, DO      DULoxetine  30 mg Oral Daily Jitendra Solis, DO      HYDROmorphone  2 mg Oral Q6H PRN Zenobia Rasmussen MD      iron polysaccharides  150 mg Oral Daily Zenobia Rasmussen MD      metoprolol  5 mg Intravenous Q6H PRN Jitendra Solis, DO      metroNIDAZOLE  500 mg Oral Q12H [address] [place]      ondansetron  4 mg Oral Q6H PRN Zenobia Rasmussen MD      saccharomyces boulardii  250 mg Oral BID [place] Garcia      vancomycin  1,250 mg Intravenous Q8H Brett Skinner MD 1,250 mg (03/11/22 1416)        Today, Patient Was Seen By: Zenobia Rasmussen MD    ** Please Note: "This note has been constructed using a voice recognition system  Therefore there may be syntax, spelling, and/or grammatical errors   Please call if you have any questions  "**

## 2022-03-11 NOTE — PROGRESS NOTES
Progress Note- Gabe Sierra 27 y o  male MRN: [id number]    Unit/Bed#: [address] [facility] 415-01 Encounter: [id number]      Assessment and Plan:    1  Pouchitis   Patient reports symptoms have improved significantly from yesterday, he has only moved his bowels 4 times today and has less urgency  Abdominal pain is improved and he reported he had required any pain medicine in several hours  Enteric stool panel negative, O and P still pending     -Continue metronidazole 500 mg p o  BID for total of 14 days   -Continue Bentyl  -Continue probiotic  -avoid NSAIDs, continue low-fiber/low residue diet  -Follow up O&P  -Follow-up outpatient with [facility]       ______________________________________________________________________    Subjective:     Patient looks comfortable sitting in bed  He states that symptoms are improved from yesterday and he is ready to go home  Reports less urgency and decreased bowel movements  Requiring less pain medication      Medication Administration - last 24 hours from 03/10/2022 1432 to 03/11/2022 1432       Date/Time Order Dose Route Action Action by     03/11/2022 0844 DULoxetine (CYMBALTA) delayed release capsule 30 mg 30 mg Oral Given Rip Carter RN     03/11/2022 1119 acetaminophen (TYLENOL) tablet 650 mg 650 mg Oral Given Rip Carter, ZACHARY     03/11/2022 1248 HYDROmorphone (DILAUDID) injection 1 mg 1 mg Intravenous Given Rip Carter, ZACHARY     03/11/2022 0845 HYDROmorphone (DILAUDID) injection 1 mg 1 mg Intravenous Given Rip Carter, ZACHARY     03/11/2022 0416 HYDROmorphone (DILAUDID) injection 1 mg 1 mg Intravenous Given Wyline Hidden, RN     03/11/2022 0106 HYDROmorphone (DILAUDID) injection 1 mg 1 mg Intravenous Given Wyline Hidden, RN     03/10/2022 2157 HYDROmorphone (DILAUDID) injection 1 mg 1 mg Intravenous Given Wyline Hidden, RN     03/10/2022 1834 HYDROmorphone (DILAUDID) injection 1 mg 1 mg Intravenous Given Sherrill Hill RN     03/10/2022 6476 HYDROmorphone (DILAUDID) injection 1 mg 1 mg Intravenous Given Roly Lockhart RN     03/11/2022 0420 sodium chloride 0 9 % infusion 75 mL/hr Intravenous 300 [facility], RN     03/11/2022 0106 promethazine (PHENERGAN) injection 12 5 mg 12 5 mg Intravenous Given Marga Jesus RN     03/10/2022 1526 promethazine (PHENERGAN) injection 12 5 mg 12 5 mg Intravenous Given Roly Lockhart RN     03/10/2022 1457 vancomycin (VANCOCIN) 1,250 mg in sodium chloride 0 9 % 250 mL IVPB 0 mg Intravenous Stopped Roly Lockhart RN     03/11/2022 0844 metroNIDAZOLE (FLAGYL) tablet 500 mg 500 mg Oral Given Lupe Rich RN     03/10/2022 2103 metroNIDAZOLE (FLAGYL) tablet 500 mg 500 mg Oral Given Marga Jesus RN     03/10/2022 1457 vancomycin (VANCOCIN) 1,750 mg in sodium chloride 0 9 % 500 mL IVPB 0 mg Intravenous Stopped Roly Lockhart RN     03/10/2022 1801 vancomycin (VANCOCIN) 1,750 mg in sodium chloride 0 9 % 500 mL IVPB 1,750 mg Intravenous [address] [facility]     03/11/2022 3203 saccharomyces boulardii (FLORASTOR) capsule 250 mg 250 mg Oral Given Lupe Rich RN     03/10/2022 1751 saccharomyces boulardii (FLORASTOR) capsule 250 mg 250 mg Oral Given Roly Lockhart RN     03/11/2022 0844 iron polysaccharides (FERREX) capsule 150 mg 150 mg Oral Given Lupe Rich RN     03/10/2022 1751 iron polysaccharides (FERREX) capsule 150 mg 150 mg Oral Given Roly Lockhart RN     03/11/2022 1119 dicyclomine (BENTYL) capsule 10 mg 10 mg Oral Given Lupe Rich RN     03/11/2022 0600 dicyclomine (BENTYL) capsule 10 mg 10 mg Oral Given Marga Jesus RN     03/10/2022 2103 dicyclomine (BENTYL) capsule 10 mg 10 mg Oral Given Marga Jesus RN     03/10/2022 1751 dicyclomine (BENTYL) capsule 10 mg 10 mg Oral Given Roly Lockhart RN     03/11/2022 1416 vancomycin (VANCOCIN) 1,250 mg in sodium chloride 0 9 % 250 mL IVPB 1,250 mg Intravenous [address] Lupe Rich RN     03/11/2022 0558 vancomycin (VANCOCIN) 1,250 mg in sodium chloride 0 9 % 250 mL IVPB 1,250 mg Intravenous [address]     03/11/2022 0423 ondansetron (ZOFRAN) injection 4 mg 4 mg Intravenous Given Marga Jesus RN          Objective:     Vitals: Blood pressure 140/96, pulse 79, temperature 98 5 °F (36 9 °C), temperature source Oral, resp  rate 18, height 5' 9" (1 753 m), weight 86 4 kg (190 lb 7 6 oz), SpO2 95 %  ,Body mass index is 28 13 kg/m²  Intake/Output Summary (Last 24 hours) at 3/11/2022 1432  Last data filed at 3/11/2022 1201  Gross per 24 hour   Intake 380 ml   Output 900 ml   Net -520 ml       Physical Exam:   General Appearance: Awake and alert, in no acute distress  Abdomen: Soft, non-tender, non-distended; bowel sounds normal; no masses or no organomegaly    Invasive Devices  Report    Peripherally Inserted Central Catheter Line            PICC Line 03/01/22 Right Brachial 10 days                Lab Results:  No results displayed because visit has over 200 results  Imaging Studies: I have personally reviewed pertinent imaging studies

## 2022-03-11 NOTE — PROGRESS NOTES
Vancomycin Assessment    Mio Rapp is a 27 y o  male who is currently receiving vancomycin 1750mg IVPB Q 8 hours for bacteremia     Relevant clinical data and objective history reviewed:  Creatinine   Date Value Ref Range Status   03/10/2022 0 93 0 60 - 1 30 mg/dL Final     Comment:     Standardized to IDMS reference method   03/09/2022 0 88 0 60 - 1 30 mg/dL Final     Comment:     Standardized to IDMS reference method   03/08/2022 0 96 0 60 - 1 30 mg/dL Final     Comment:     Standardized to IDMS reference method   10/31/2015 0 9 0 6 - 1 3 mg/dL    10/30/2015 1 0 0 6 - 1 3 mg/dL      /86   Pulse 70   Temp 98 1 °F (36 7 °C)   Resp (!) 11   Ht 5' 9" (1 753 m)   Wt 86 4 kg (190 lb 7 6 oz)   SpO2 96%   BMI 28 13 kg/m²   I/O last 3 completed shifts:  In: -   Out: 1000 [Urine:1000]  Lab Results   Component Value Date/Time    BUN 7 03/10/2022 06:10 AM    BUN 7 10/26/2018 07:34 AM    WBC 9 06 03/10/2022 06:10 AM    WBC 9 0 10/31/2015 06:00 AM    HGB 9 2 (L) 03/10/2022 06:10 AM    HGB 10 5 (L) 10/31/2015 06:00 AM    HCT 32 2 (L) 03/10/2022 06:10 AM    HCT 34 2 (L) 10/31/2015 06:00 AM    MCV 65 (L) 03/10/2022 06:10 AM    MCV 61 3 (L) 10/31/2015 06:00 AM     (H) 03/10/2022 06:10 AM     10/31/2015 06:00 AM     Temp Readings from Last 3 Encounters:   03/10/22 98 1 °F (36 7 °C)   03/03/22 (!) 97 3 °F (36 3 °C)   02/21/22 97 9 °F (36 6 °C) (Oral)     Vancomycin Days of Therapy: 5  Assessment/Plan  The patient is currently on vancomycin utilizing scheduled dosing  The patient is receiving 1750mg IVPB Q 8 hours with the most recent vancomycin level being not at steady-state and supratherapeutic based on a goal of 15-20 (appropriate for most indications) ; therefore, after clinical evaluation will be changed to 1250mg IVPB Q8 hours   Pharmacy will continue to follow closely for s/sx of nephrotoxicity, infusion reactions, and appropriateness of therapy  BMP and CBC will be ordered per protocol  Plan for trough as patient approaches steady state, prior to the 5th  dose at approximately 1330 on 3/12/22  Pharmacy will continue to follow the patients culture results and clinical progress daily      Elvie Holt, Pharmacist

## 2022-03-12 ENCOUNTER — TELEPHONE (OUTPATIENT)
Dept: FAMILY MEDICINE CLINIC | Facility: CLINIC | Age: 27
End: 2022-03-12

## 2022-03-12 VITALS
TEMPERATURE: 97.8 F | HEART RATE: 74 BPM | WEIGHT: 190.48 LBS | OXYGEN SATURATION: 92 % | DIASTOLIC BLOOD PRESSURE: 83 MMHG | HEIGHT: 69 IN | SYSTOLIC BLOOD PRESSURE: 134 MMHG | BODY MASS INDEX: 28.21 KG/M2 | RESPIRATION RATE: 18 BRPM

## 2022-03-12 PROBLEM — R78.81 COAGULASE NEGATIVE STAPHYLOCOCCUS BACTEREMIA: Status: RESOLVED | Noted: 2022-03-08 | Resolved: 2022-03-12

## 2022-03-12 PROBLEM — R78.81 POSITIVE BLOOD CULTURES: Status: RESOLVED | Noted: 2022-02-25 | Resolved: 2022-03-12

## 2022-03-12 PROBLEM — B95.7 COAGULASE NEGATIVE STAPHYLOCOCCUS BACTEREMIA: Status: RESOLVED | Noted: 2022-03-08 | Resolved: 2022-03-12

## 2022-03-12 LAB
BACTERIA BLD CULT: NORMAL
BACTERIA BLD CULT: NORMAL

## 2022-03-12 PROCEDURE — 99239 HOSP IP/OBS DSCHRG MGMT >30: CPT | Performed by: INTERNAL MEDICINE

## 2022-03-12 RX ORDER — SACCHAROMYCES BOULARDII 250 MG
250 CAPSULE ORAL 2 TIMES DAILY
Qty: 60 CAPSULE | Refills: 0 | Status: SHIPPED | OUTPATIENT
Start: 2022-03-12 | End: 2022-06-01

## 2022-03-12 RX ORDER — ACETAMINOPHEN 325 MG/1
650 TABLET ORAL EVERY 6 HOURS PRN
Qty: 30 TABLET | Refills: 0 | Status: SHIPPED | OUTPATIENT
Start: 2022-03-12 | End: 2022-06-01

## 2022-03-12 RX ORDER — HYDROMORPHONE HCL/PF 1 MG/ML
1 SYRINGE (ML) INJECTION ONCE
Status: COMPLETED | OUTPATIENT
Start: 2022-03-12 | End: 2022-03-12

## 2022-03-12 RX ORDER — IRON POLYSACCHARIDE COMPLEX 150 MG
150 CAPSULE ORAL DAILY
Qty: 30 CAPSULE | Refills: 0 | Status: SHIPPED | OUTPATIENT
Start: 2022-03-13 | End: 2022-06-01

## 2022-03-12 RX ORDER — METRONIDAZOLE 500 MG/1
500 TABLET ORAL EVERY 12 HOURS SCHEDULED
Qty: 24 TABLET | Refills: 0 | Status: SHIPPED | OUTPATIENT
Start: 2022-03-12 | End: 2022-03-24

## 2022-03-12 RX ORDER — DICYCLOMINE HYDROCHLORIDE 10 MG/1
10 CAPSULE ORAL
Qty: 120 CAPSULE | Refills: 0 | Status: SHIPPED | OUTPATIENT
Start: 2022-03-12 | End: 2022-06-01

## 2022-03-12 RX ADMIN — METRONIDAZOLE 500 MG: 500 TABLET ORAL at 09:43

## 2022-03-12 RX ADMIN — DULOXETINE HYDROCHLORIDE 30 MG: 30 CAPSULE, DELAYED RELEASE ORAL at 09:43

## 2022-03-12 RX ADMIN — HYDROMORPHONE HYDROCHLORIDE 1 MG: 1 INJECTION, SOLUTION INTRAMUSCULAR; INTRAVENOUS; SUBCUTANEOUS at 05:57

## 2022-03-12 RX ADMIN — Medication 250 MG: at 09:43

## 2022-03-12 RX ADMIN — VANCOMYCIN HYDROCHLORIDE 1250 MG: 10 INJECTION, POWDER, LYOPHILIZED, FOR SOLUTION INTRAVENOUS at 13:22

## 2022-03-12 RX ADMIN — DICYCLOMINE HYDROCHLORIDE 10 MG: 10 CAPSULE ORAL at 11:27

## 2022-03-12 RX ADMIN — POLYSACCHARIDE-IRON COMPLEX 150 MG: 150 CAPSULE ORAL at 09:43

## 2022-03-12 RX ADMIN — HYDROMORPHONE HYDROCHLORIDE 2 MG: 2 TABLET ORAL at 03:24

## 2022-03-12 RX ADMIN — DICYCLOMINE HYDROCHLORIDE 10 MG: 10 CAPSULE ORAL at 06:00

## 2022-03-12 RX ADMIN — VANCOMYCIN HYDROCHLORIDE 1250 MG: 10 INJECTION, POWDER, LYOPHILIZED, FOR SOLUTION INTRAVENOUS at 05:57

## 2022-03-12 NOTE — PROGRESS NOTES
Patient reports abdominal pain    He was switched to oral dilaudid and requesting a dose of IV for breakthrough   Pain  Ordered 1 mg IV once as needed     Discussed with RN

## 2022-03-12 NOTE — CASE MANAGEMENT
Case Management Discharge Planning Note    Patient name Mio Rapp  Location [address] [facility] [address] 598-* MRN [id number]  : 1995 Date 3/12/2022       Current Admission Date: 3/6/2022  Current Admission Diagnosis:Polymicrobial after streptococcal bacteremia   Patient Active Problem List    Diagnosis Date Noted    Presumed AV endocarditis 2022    Pouchitis ([address] ) 2022    Anemia 2022    Polymicrobial after streptococcal bacteremia 2022    Enteritis 2022    SIRS (systemic inflammatory response syndrome) ([facility] ) 2022    Sepsis ([facility] ) 2022    Ankylosing spondylitis ([facility] ) 2022    Elevated LFTs 2022    Arm and leg movements, uncontrollable 2021    Seizure-like activity ([address] ) 2021    Arthralgia 09/15/2020    Sacroiliac joint dysfunction of right side 2020    Left groin pain 2019    Left-sided low back pain without sciatica     Complications of intestinal pouch ([facility] ) 2019    SARAHI (acute kidney injury) ([facility] ) 2019    History of ulcerative colitis 2019    Hyponatremia 2019    CAR (generalized anxiety disorder) 10/19/2018    BMI 28 0-28 9,adult 10/19/2018    Chronic ulcerative pancolitis ([facility] ) 2018    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[facility] ) 2018    Stricture of rectum 2018      LOS (days): 2  Geometric Mean LOS (GMLOS) (days):   Days to GMLOS:     OBJECTIVE:  Risk of Unplanned Readmission Score: 20     Current admission status: Inpatient   Preferred Pharmacy:   [address] [place] [address]  Phone: [phone] Fax: [phone]    Primary Care Provider: Obi Solis DO    Primary Insurance: BLUE CROSS  Secondary Insurance:     DISCHARGE DETAILS:    CM received a call back from Rebecca Melton with [facility] requesting today's Vanco trough  CM could not find a resulted trough from today or yesterday   CM made Dr Melvin Pringle  All other [facility] troughs done were faxed to Krystal Brody

## 2022-03-12 NOTE — INCIDENTAL FINDINGS
The following findings require follow up:  Radiographic finding   Finding:  CT scan- Right lower lobe centrilobular nodular changes concerning for an infectious or inflammatory process  Patient is post colectomy  There is mural thickening of the rectum and distal small bowel concerning for enteritis/proctitis/pouchitis    There is a 1 3 cm lymph node in the right iliac chain (3:64)    This is similar to the prior exam    Follow up required:  Yes    Please notify the following clinician to assist with the follow up:   Dr Elly William DO and GI/[facility]

## 2022-03-12 NOTE — PLAN OF CARE
Problem: PAIN - ADULT  Goal: Verbalizes/displays adequate comfort level or baseline comfort level  Description: Interventions:  - Encourage patient to monitor pain and request assistance  - Assess pain using appropriate pain scale  - Administer analgesics based on type and severity of pain and evaluate response  - Implement non-pharmacological measures as appropriate and evaluate response  - Consider cultural and social influences on pain and pain management  - Notify physician/advanced practitioner if interventions unsuccessful or patient reports new pain  Outcome: Progressing     Problem: INFECTION - ADULT  Goal: Absence or prevention of progression during hospitalization  Description: INTERVENTIONS:  - Assess and monitor for signs and symptoms of infection  - Monitor lab/diagnostic results  - Monitor all insertion sites, i e  indwelling lines, tubes, and drains  - Monitor endotracheal if appropriate and nasal secretions for changes in amount and color  - [place] appropriate cooling/warming therapies per order  - Administer medications as ordered  - Instruct and encourage patient and family to use good hand hygiene technique  - Identify and instruct in appropriate isolation precautions for identified infection/condition  Outcome: Progressing  Goal: Absence of fever/infection during neutropenic period  Description: INTERVENTIONS:  - Monitor WBC    Outcome: Progressing     Problem: DISCHARGE PLANNING  Goal: Discharge to home or other facility with appropriate resources  Description: INTERVENTIONS:  - Identify barriers to discharge w/patient and caregiver  - Arrange for needed discharge resources and transportation as appropriate  - Identify discharge learning needs (meds, wound care, etc )  - Arrange for interpretive services to assist at discharge as needed  - Refer to Case Management Department for coordinating discharge planning if the patient needs post-hospital services based on physician/advanced practitioner order or complex needs related to functional status, cognitive ability, or social support system  Outcome: Progressing     Problem: Knowledge Deficit  Goal: Patient/family/caregiver demonstrates understanding of disease process, treatment plan, medications, and discharge instructions  Description: Complete learning assessment and assess knowledge base    Interventions:  - Provide teaching at level of understanding  - Provide teaching via preferred learning methods  Outcome: Progressing

## 2022-03-12 NOTE — PLAN OF CARE
Problem: Potential for Falls  Goal: Patient will remain free of falls  Description: INTERVENTIONS:  - Educate patient/family on patient safety including physical limitations  - Instruct patient to call for assistance with activity   - Consult OT/PT to assist with strengthening/mobility   - Keep Call bell within reach  - Keep bed low and locked with side rails adjusted as appropriate  - Keep care items and personal belongings within reach  - Initiate and maintain comfort rounds  - Make Fall Risk Sign visible to staff  - Offer Toileting every 2 Hours, in advance of need  - Initiate/Maintain bed alarm  - Obtain necessary fall risk management equipment: call bell   - Apply yellow socks and bracelet for high fall risk patients  - Consider moving patient to room near nurses station  Outcome: Progressing     Problem: PAIN - ADULT  Goal: Verbalizes/displays adequate comfort level or baseline comfort level  Description: Interventions:  - Encourage patient to monitor pain and request assistance  - Assess pain using appropriate pain scale  - Administer analgesics based on type and severity of pain and evaluate response  - Implement non-pharmacological measures as appropriate and evaluate response  - Consider cultural and social influences on pain and pain management  - Notify physician/advanced practitioner if interventions unsuccessful or patient reports new pain  Outcome: Progressing     Problem: INFECTION - ADULT  Goal: Absence or prevention of progression during hospitalization  Description: INTERVENTIONS:  - Assess and monitor for signs and symptoms of infection  - Monitor lab/diagnostic results  - Monitor all insertion sites, i e  indwelling lines, tubes, and drains  - Monitor endotracheal if appropriate and nasal secretions for changes in amount and color  - [place] appropriate cooling/warming therapies per order  - Administer medications as ordered  - Instruct and encourage patient and family to use good hand hygiene technique  - Identify and instruct in appropriate isolation precautions for identified infection/condition  Outcome: Progressing  Goal: Absence of fever/infection during neutropenic period  Description: INTERVENTIONS:  - Monitor WBC    Outcome: Progressing     Problem: SAFETY ADULT  Goal: Patient will remain free of falls  Description: INTERVENTIONS:  - Educate patient/family on patient safety including physical limitations  - Instruct patient to call for assistance with activity   - Consult OT/PT to assist with strengthening/mobility   - Keep Call bell within reach  - Keep bed low and locked with side rails adjusted as appropriate  - Keep care items and personal belongings within reach  - Initiate and maintain comfort rounds  - Make Fall Risk Sign visible to staff  - Offer Toileting every 2 Hours, in advance of need  - Initiate/Maintain bedalarm  - Obtain necessary fall risk management equipment: call bell  - Apply yellow socks and bracelet for high fall risk patients  - Consider moving patient to room near nurses station  Outcome: Progressing  Goal: Maintain or return to baseline ADL function  Description: INTERVENTIONS:  - Educate patient/family on patient safety including physical limitations  - Instruct patient to call for assistance with activity   - Consult OT/PT to assist with strengthening/mobility   - Keep Call bell within reach  - Keep bed low and locked with side rails adjusted as appropriate  - Keep care items and personal belongings within reach  - Initiate and maintain comfort rounds  - Make Fall Risk Sign visible to staff  - Offer Toileting every 2 Hours, in advance of need  - Initiate/Maintain bedalarm  - Obtain necessary fall risk management equipment: call bell  - Apply yellow socks and bracelet for high fall risk patients  - Consider moving patient to room near nurses station  Outcome: Progressing  Goal: Maintains/Returns to pre admission functional level  Description: INTERVENTIONS:  - Assess patient's ability to carry out ADLs; assess patient's baseline for ADL function and identify physical deficits which impact ability to perform ADLs (bathing, care of mouth/teeth, toileting, grooming, dressing, etc )  - Assess/evaluate cause of self-care deficits   - Assess range of motion  - Assess patient's mobility; develop plan if impaired  - Assess patient's need for assistive devices and provide as appropriate  - Encourage maximum independence but intervene and supervise when necessary  - Involve family in performance of ADLs  - Assess for home care needs following discharge   - Consider OT consult to assist with ADL evaluation and planning for discharge  - Provide patient education as appropriate  Outcome: Progressing     Problem: DISCHARGE PLANNING  Goal: Discharge to home or other facility with appropriate resources  Description: INTERVENTIONS:  - Identify barriers to discharge w/patient and caregiver  - Arrange for needed discharge resources and transportation as appropriate  - Identify discharge learning needs (meds, wound care, etc )  - Arrange for interpretive services to assist at discharge as needed  - Refer to Case Management Department for coordinating discharge planning if the patient needs post-hospital services based on physician/advanced practitioner order or complex needs related to functional status, cognitive ability, or social support system  Outcome: Progressing     Problem: Knowledge Deficit  Goal: Patient/family/caregiver demonstrates understanding of disease process, treatment plan, medications, and discharge instructions  Description: Complete learning assessment and assess knowledge base    Interventions:  - Provide teaching at level of understanding  - Provide teaching via preferred learning methods  Outcome: Progressing

## 2022-03-12 NOTE — CASE MANAGEMENT
Case Management Discharge Planning Note    Patient name Ben   Location [address] [facility] 415/[address] 12-* MRN [id number]  : 1995 Date 3/12/2022       Current Admission Date: 3/6/2022  Current Admission Diagnosis:Polymicrobial after streptococcal bacteremia   Patient Active Problem List    Diagnosis Date Noted    Presumed AV endocarditis 2022    Coagulase negative Staphylococcus bacteremia 2022    Pouchitis ([facility] ) 2022    Anemia 2022    Polymicrobial after streptococcal bacteremia 2022    Enteritis 2022    SIRS (systemic inflammatory response syndrome) ([facility] ) 2022    Sepsis ([facility] [facility] ) 2022    Ankylosing spondylitis ([facility] ) 2022    Elevated LFTs 2022    Arm and leg movements, uncontrollable 2021    Seizure-like activity ([facility] ) 2021    Arthralgia 09/15/2020    Sacroiliac joint dysfunction of right side 2020    Left groin pain 2019    Left-sided low back pain without sciatica 55/15/8612    Complications of intestinal pouch ([facility] ) 2019    SARAHI (acute kidney injury) ([facility] [facility] ) 2019    History of ulcerative colitis 2019    Hyponatremia 2019    CAR (generalized anxiety disorder) 10/19/2018    BMI 28 0-28 9,adult 10/19/2018    Chronic ulcerative pancolitis ([facility] [facility] ) 2018    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[facility] [facility] ) 2018    Stricture of rectum 2018      LOS (days): 2  Geometric Mean LOS (GMLOS) (days):   Days to GMLOS:     OBJECTIVE:  Risk of Unplanned Readmission Score: 20         Current admission status: Inpatient   Preferred Pharmacy:   [address] [address]  Phone: [phone] Fax: [phone]    Primary Care Provider: Jeanna Parikh DO    Primary Insurance: BLUE CROSS  Secondary Insurance:     DISCHARGE DETAILS:    Discharge planning discussed with[de-identified] Patient  Freedom of Choice: Yes  Comments - Freedom of Choice: CM spoke with Capri with [employer] and confirmed they can supply medications and nursing for first dose of Vanco today  She states they will call patient to coordinate and will be out for 9PM dose  This was confirmed with patient and he states understanding  He will have a ride home with his father at [place]  Patients nurse and Dr Kathleen Augustine were made aware    CM contacted family/caregiver?: No- see comments (Patient states he will speak to father )  Were Treatment Team discharge recommendations reviewed with patient/caregiver?: Yes  Did patient/caregiver verbalize understanding of patient care needs?: Yes  Were patient/caregiver advised of the risks associated with not following Treatment Team discharge recommendations?: Yes                        Would you like to participate in our [address] service program?  : No - Declined          Transport at Discharge : [place]

## 2022-03-12 NOTE — DISCHARGE SUMMARY
Discharge Summary - [facility] Internal Medicine    Patient Information: Darrin Goodpasture 27 y o  male MRN: [id number]  Unit/Bed#: [address] [place] [address] Encounter: [id number]    Discharging Physician / Practitioner: Ghanshyam East MD  PCP: Ladonna Lozada DO  Admission Date: 3/6/2022  Discharge Date: 03/12/22    Reason for Admission: Allergic Reaction (Pt experiencing hives to neck and abdominal pain after administering cefazolin  Pt does not c/o SOB )      Discharge Diagnoses:     Principal Problem:    Polymicrobial after streptococcal bacteremia  Active Problems:    Enteritis    Pouchitis ([address] )    Presumed AV endocarditis    Anemia    Coagulase negative Staphylococcus bacteremia  Resolved Problems:    Tachycardia    Leukocytosis    Hives    Hypokalemia        * Polymicrobial after streptococcal bacteremia  Assessment & Plan  Noted during recent hospitalization  Deemed to be secondary to GI translocation   Repeat cultures were negative  Patient was subsequently discharged on outpatient IV cefazolin but noted to have hives, presented back to the hospital  Seen by ID input appreciated  Antibiotics were changed to vancomycin but Vancomycin was held on 03/07 with concern of an allergic reaction? Possibly infusion related  Patient had received vancomycin at the time of admission and during prior hospitalization without any intolerance  Restarted IV vancomycin , initially slow infusion which was gradually increased  Patient is tolerating without any intolerance since 03/08  · Continue IV vancomycin  · Plan to continue 4 weeks through 3/24  · Follow-up with ID after discharge as scheduled  · Follow-up labs, CBC, creatinine and vancomycin level while on antibiotics    Tachycardia-resolved as of 3/11/2022  Assessment & Plan  Noted to be in SVT upon arrival to ED,S/p adenosine x3 and cardizem infusion   Seen by Cardiology, input appreciated  Noted to have sinus tachycardia, low suspicion of reentry SVT    Likely secondary to allergic reaction  Recommended observation  · No further episodes  · Monitor on telemetry   · Supportive care    Presumed AV endocarditis  Assessment & Plan  Underwent 2D echo and BUCKY during recent hospitalization in setting of alpha staph bacteremia  No evidence of definitive endocarditis but noted possible vegetation versus redundant chordae at base of AV  · Continue IV vancomycin    Pouchitis (HCC)  Assessment & Plan  S/p pouchoscopy along with TTS balloon dilatation on previous admission   Biopsy with evidence of chronic active entritis  Negative for malignancy  Symptoms are improving, tolerating diet  Less pain  · GI following, input appreciated  Hydrocortisone enema discontinued  Metronidazole added   · Continue metronidazole for 2 weeks, continue Bentyl, probiotic  · Continue symptomatic treatment  Discussed about limiting opiates, patient does not want to continue opiates on discharge  Reports that overall pain is better  · Follow-up with GI and [facility] after discharge    Enteritis  Assessment & Plan  History of UC status post colectomy and ileal pouch formation  GI evaluation appreciated  Follow-up recommendation  Symptoms are improving, diarrhea now at baseline, tolerating diet  Abdominal exam benign  · Repeat  stool studies , stool bacterial PCR and stool ova and parasite-negative  Stool C diff and stool doses were negative in prior admission  CRP is low  · Flagyl added, continue b i d  For 2 weeks  · Continue Bentyl, probiotic  · Symptomatic treatment  · GI follow-up after discharge as scheduled      Hives-resolved as of 3/9/2022  Assessment & Plan  Likely secondary to cefazolin    Now resolved  · Monitor symptoms    Leukocytosis-resolved as of 3/11/2022  Assessment & Plan  Likely secondary to steroids  Downtrending  Monitor    Anemia  Assessment & Plan  Chronic, relatively stable  started oral iron supplement  Continue    Hypokalemia-resolved as of 3/11/2022  Assessment & Plan  Repleted, improved    Coagulase negative Staphylococcus bacteremia  Assessment & Plan  Noted during recent hospitalization, likely contaminant  Repeat culture has been negative      Consultations During Hospital Stay:  [address] CONSULT TO CARDIOLOGY  IP CONSULT TO INFECTIOUS DISEASES  IP CONSULT TO PHARMACY  IP CONSULT TO GASTROENTEROLOGY    Procedures Performed:     · None    Significant Findings:     · Refer to hospital course and above listed diagnosis related plan for details    Imaging while in hospital:    Colonoscopy    Result Date: 3/1/2022  Narrative: [address] [facility] Operating Room [address] [place] [address] [phone] DATE OF SERVICE: 3/01/22 PHYSICIAN(S): Dirk Hackett MD Proceduralist Procedure :  Pouchoscopy along with TTS balloon dilatation with multiple biopsies INDICATION: Stricture intestinal ([address] ) POST-OP DIAGNOSIS: See the impression below  HISTORY: Prior colonoscopy: Less than 3 years ago  It is being repeated at an interval of less than 3 years because: This colonoscopy is being performed for a diagnostic indication BOWEL PREPARATION: Miralax/Magnesium Citrate; Enema PREPROCEDURE: Informed consent was obtained for the procedure, including sedation  Risks including but not limited to bleeding, infection, perforation, adverse drug reaction and aspiration were explained in detail  Also explained about less than 100% sensitivity with the exam and other alternatives  The patient was placed in the left lateral decubitus position  DETAILS OF PROCEDURE: Patient was taken to the procedure room where a time out was performed to confirm correct patient and correct procedure  The patient underwent monitored anesthesia care, which was administered by an anesthesia professional  The patient's blood pressure, heart rate, level of consciousness, oxygen and respirations were monitored throughout the procedure  A digital rectal exam was performed   A perianal exam was performed  The scope was introduced through the anus and advanced to the terminal ileum  Retroflexion was performed in the rectum  The quality of bowel preparation was evaluated using the [facility] Bowel Preparation Scale with scores of: right colon = not assessed, transverse colon = not assessed, left colon = 3  The total BBPS score was 3  Bowel prep was adequate  The patient experienced no blood loss  The procedure was not difficult  The patient tolerated the procedure well  There were no apparent complications  ANESTHESIA INFORMATION: ASA: II Anesthesia Type: IV Sedation with Anesthesia MEDICATIONS: No administrations occurring from 1401 to 1440 on 03/01/22 FINDINGS: History of severe ulcerative colitis, status post proctocolectomy, status post J-pouch  Pouchoscopy examination shows stricture with slight narrowing at ileo anal anastomotic site  Liquid stool in the pouch which was suction it out  There is evidence of pouchitis with multiple small ulceration throughout in the ileum and NJ pouch, multiple biopsies were done  With the use of TTS balloon 18, 19 and 20 mm size balloon, stricture site was dilated Moderate, localized edematous, erythematous and ulcerated mucosa in the terminal ileum; performed cold forceps biopsy EVENTS: Procedure Events Event Event Time ENDO CECUM REACHED 3/1/2022  2:28 PM ENDO SCOPE OUT TIME 3/1/2022  2:39 PM SPECIMENS: ID Type Source Tests Collected by Time Destination 1 : Terminal Ileum Bx Tissue Terminal Ileum TISSUE EXAM Fahad Bains MD 3/1/2022  2:24 PM  EQUIPMENT: Colonoscope -     Impression: 1  Evidence of pouchitis with multiple small ulceration and narrowing at ileo anal  anastomotic site, status post TTS balloon dilatation RECOMMENDATION: Await pathology results Hydrocortisone enema Resume diet  Fahad Bains MD     XR chest 1 view portable    Result Date: 3/7/2022  Narrative: CHEST INDICATION:   history of endocarditis, tachycardia  COMPARISON:  05/24/2021 EXAM PERFORMED/VIEWS:  XR CHEST PORTABLE FINDINGS: Cardiomediastinal silhouette appears unremarkable  The lungs are clear  No pneumothorax or pleural effusion  Osseous structures appear within normal limits for patient age  Moderate gaseous distention of the stomach  Impression: No acute cardiopulmonary disease  Moderate gaseous distention of the stomach  Workstation performed: [id number]     XR abdomen complete inc upright and/or decubitus    Result Date: 3/10/2022  Narrative: OBSTRUCTION SERIES INDICATION:   r/o perforation ? acute abdomen  COMPARISON:  Most recent prior imaging of the abdomen is a radiograph dated February 28, 2022 and a CT scan dated February 23, 2022  EXAM PERFORMED/VIEWS:  XR ABDOMEN COMPLETE INC UPRIGHT AND/OR DECUBITUS FINDINGS: Note is again made of a chronically distended loop of small bowel in the upper abdomen  There is a normal bowel gas pattern throughout the remainder of the abdomen and the pelvis  No free air  Staple line in the pelvis at the site of ileoanal anastomosis  Osseous structures are unremarkable  Examination of the chest reveals a normal cardiomediastinal silhouette  Lungs are clear  Impression: 1  Chronically dilated loop of small bowel in the upper abdomen  No evidence of obstruction  No free air  Workstation performed: [id number]     XR abdomen obstruction series    Result Date: 2/28/2022  Narrative: OBSTRUCTION SERIES INDICATION:   Abdominal distension, nausea  History of colectomy  COMPARISON:  CTs dated 2/23/2022 and 2/21/2022  EXAM PERFORMED/VIEWS:  XR ABDOMEN OBSTRUCTION SERIES FINDINGS: Lung volumes are normal   No focal consolidation  No effusion  No pneumothorax  Cardiomediastinal silhouette is normal   Normal pulmonary vasculature  Central gas-filled loops of small bowel status post colectomy and ileoanal anastomosis  Ileal pouch pouch measures up to 8 6 cm in caliber   This is unchanged from priors without progressive distention  There are a few air-fluid levels but they are nondifferential (less than 2 5 cm height difference in a single loop)  Gas extends to the sigmoid colon  Chain sutures at the rectosigmoid colon  Paucity of gas projecting over the distal ileoanal anastomosis  No free air or suspicious air-fluid levels  No pathologic calcifications or soft tissue masses evident  Osseous structures are unremarkable  Impression: 1  Bowel gas pattern most suggestive of ileus or enteritis  No evidence for progressive obstruction  2   No acute cardiopulmonary findings  Workstation performed: [id number]     CT pe study w abdomen pelvis w contrast    Result Date: 3/6/2022  Narrative: CT PULMONARY ANGIOGRAM OF THE CHEST AND CT ABDOMEN AND PELVIS WITH INTRAVENOUS CONTRAST INDICATION:   Epigastric pain epigastric abdominal pain  COMPARISON:  2/23/2022 TECHNIQUE:  CT examination of the chest, abdomen and pelvis was performed  Thin section CT angiographic technique was used in the chest in order to evaluate for pulmonary embolus and coronal 3D MIP postprocessing was performed on the acquisition scanner  Axial, sagittal, and coronal 2D reformatted images were created from the source data and submitted for interpretation  Radiation dose length product (DLP) for this visit:  717 mGy-cm   This examination, like all CT scans performed in the [facility], was performed utilizing techniques to minimize radiation dose exposure, including the use of iterative reconstruction and automated exposure control  IV Contrast:  100 mL of iohexol (OMNIPAQUE) Enteric Contrast:  Enteric contrast was not administered  FINDINGS: CHEST PULMONARY ARTERIAL TREE:  No pulmonary embolus is seen  LUNGS:  Right lower lobe centrilobular nodular changes concerning for an infectious or inflammatory process (2:)  There is no tracheal or endobronchial lesion  PLEURA:  Unremarkable  HEART/AORTA:  Heart is unremarkable for patient's age    No thoracic aortic aneurysm  MEDIASTINUM AND GREGORIO:  There are small hiatal hernia    CHEST WALL AND LOWER NECK:   Unremarkable  ABDOMEN LIVER/BILIARY TREE:  Liver is diffusely decreased in density consistent with fatty change  No CT evidence of suspicious hepatic mass  Normal hepatic contours  No biliary dilatation  GALLBLADDER:  No calcified gallstones  No pericholecystic inflammatory change  SPLEEN:  Unremarkable  PANCREAS:  Unremarkable  ADRENAL GLANDS:  Unremarkable  KIDNEYS/URETERS:  Unremarkable  No hydronephrosis  STOMACH AND BOWEL:  Patient is post colectomy  There is mural thickening of the rectum and distal small bowel concerning for enteritis/proctitis/pouchitis    APPENDIX:  Not visualized consistent with prior colectomy  ABDOMINOPELVIC CAVITY:  No ascites  No pneumoperitoneum  There is a 1 3 cm lymph node in the right iliac chain (3:64)  This is similar to the prior exam  VESSELS:  Unremarkable for patient's age  PELVIS REPRODUCTIVE ORGANS:  Unremarkable for patient's age  URINARY BLADDER:  Unremarkable  ABDOMINAL WALL/INGUINAL REGIONS:  Unremarkable  OSSEOUS STRUCTURES:  No acute fracture or destructive osseous lesion  Impression: Right lower lobe centrilobular nodular changes concerning for an infectious or inflammatory process (2:)  Patient is post colectomy  There is mural thickening of the rectum and distal small bowel concerning for enteritis/proctitis/pouchitis    There is a 1 3 cm lymph node in the right iliac chain (3:64)  This is similar to the prior exam  Workstation performed: [id number]     CT abdomen pelvis with contrast    Result Date: 2/24/2022  Narrative: CT ABDOMEN AND PELVIS WITH IV CONTRAST INDICATION:   Sepsis  COMPARISON:  CT of abdomen pelvis on February 21, 2022  TECHNIQUE:  CT examination of the abdomen and pelvis was performed  Axial, sagittal, and coronal 2D reformatted images were created from the source data and submitted for interpretation   Radiation dose length product (DLP) for this visit:  756 79 mGy-cm   This examination, like all CT scans performed in the [facility], was performed utilizing techniques to minimize radiation dose exposure, including the use of iterative  reconstruction and automated exposure control  IV Contrast:  100 mL of iohexol (OMNIPAQUE) Enteric Contrast:  Enteric contrast was not administered  FINDINGS: ABDOMEN LOWER CHEST:  No clinically significant abnormality identified in the visualized lower chest  LIVER/BILIARY TREE:  Unremarkable  GALLBLADDER:  No calcified gallstones  No pericholecystic inflammatory change  SPLEEN:  Unremarkable  PANCREAS:  Unremarkable  ADRENAL GLANDS:  Unremarkable  KIDNEYS/URETERS:  Unremarkable  No hydronephrosis  STOMACH AND BOWEL:  Status post colectomy and ileal pouch anal anastomosis  Stable mild wall thickening of the distal ileum and pouch  No bowel obstruction  Stable distention of the rectal pouch  Stable gaseous distention of a loop of bowel in the upper abdomen  APPENDIX:  Absent ABDOMINOPELVIC CAVITY:  No ascites  No pneumoperitoneum  Stable enlarged right external iliac lymph node measuring 1 5 cm in short axis (series 2, image 69)  Stable right para-aortic lymph noted measuring 1 2 cm in short axis (series 2, and 49) VESSELS:  Unremarkable for patient's age  PELVIS REPRODUCTIVE ORGANS:  Unremarkable for patient's age  URINARY BLADDER:  Unremarkable  ABDOMINAL WALL/INGUINAL REGIONS:  Unremarkable  OSSEOUS STRUCTURES:  No acute fracture or destructive osseous lesion  Impression: No significant interval change  Stable mild wall thickening of the distal ileum and pouch which may be due to nonspecific enteritis  Workstation performed: [id number]     CT abdomen pelvis w contrast    Result Date: 2/21/2022  Narrative: CT ABDOMEN AND PELVIS WITH IV CONTRAST INDICATION:   llq pain history of crohn's/pancolitis   COMPARISON:  January 2, 2022 TECHNIQUE:  CT examination of the abdomen and pelvis was performed  Axial, sagittal, and coronal 2D reformatted images were created from the source data and submitted for interpretation  Radiation dose length product (DLP) for this visit:  170 99 mGy-cm   This examination, like all CT scans performed in the [facility], was performed utilizing techniques to minimize radiation dose exposure, including the use of iterative  reconstruction and automated exposure control  IV Contrast:  100 mL of iohexol (OMNIPAQUE) Enteric Contrast:  Enteric contrast was not administered  FINDINGS: ABDOMEN LOWER CHEST:  No clinically significant abnormality identified in the visualized lower chest  LIVER/BILIARY TREE:  Unremarkable  GALLBLADDER:  Contracted  No calcified stones  SPLEEN:  Unremarkable  PANCREAS:  Unremarkable  ADRENAL GLANDS:  Unremarkable  KIDNEYS/URETERS:  Unremarkable  No hydronephrosis  STOMACH AND BOWEL: As stated previously: Patient is status post colectomy and ileal pouch-anal anastomosis  There is no evidence of obstruction  The previous mild bowel wall thickening and mucosal hyperemia in right lower quadrant small bowel creating the ileal pouch persists, (currently best seen on image 2/61-2/79)  Most likely chronic inflammatory enteritis  APPENDIX:  Removed ABDOMINOPELVIC CAVITY:  Stable 1 2 cm aortocaval node (image 2/50) and a stable 1 5 cm right external iliac node  (Image 2/68)  Both have demonstrated long-term stability  Trace fluid in the right pelvis in the obturator region  No free air  VESSELS:  Unremarkable for patient's age  PELVIS REPRODUCTIVE ORGANS:  Unremarkable for patient's age  URINARY BLADDER:  Unremarkable  ABDOMINAL WALL/INGUINAL REGIONS:  Unremarkable  OSSEOUS STRUCTURES:  No acute fracture or destructive osseous lesion  Impression: Patient is status post colectomy and ileal pouch-anal anastomosis   Similar appearance compared from prior exam with persistent mild small bowel wall thickening involving the distal small bowel creating the pouch, but with less pronounced mucosal enhancement  This likely represents chronic inflammatory enteritis  No obstruction or perforation  Trace amounts of pelvic fluid is seen in the right obturator location  Persistent aortocaval and right external iliac adenopathy, which have demonstrated long-term stability  Workstation performed: [id number]     IR PICC line placement single lumen (preferred for home anitbiotics/medications)    Result Date: 3/1/2022  Narrative: PERCUTANEOUSLY INSERTED CENTRAL VENOUS CATHETER PLACEMENT HISTORY:   Patient in need of a PICC line for IV antibiotics as an outpatient  FLUORO TIME: 0 2 min NUMBER OF IMAGES:  One fluoroscopic image PROCEDURE:  The patient was brought to the Interventional Radiology Suite and identified verbally and by wrist band  The right brachial vein was evaluated as a potential access site with ultrasound  The vessel was found to be patent and compressible  The site was prepped and draped in the usual sterile fashion  All elements of maximal sterile barrier technique, cap and mask and sterile gown and sterile gloves and sterile full-body drape and hand hygiene and 2% chlorhexidine for cutaneous antisepsis  Sterile ultrasound technique with sterile gel and sterile probe covers was also utilized  Lidocaine was administered to the skin and a small skin incision was made  Under ultrasound guidance, utilizing sterile ultrasound technique with sterile gel and a sterile probe cover, the right brachial vein was accessed using single wall Seldinger technique  Static images of real time needle entry into the vessel were obtained  This was followed by a  018 guidewire and a sheath and dilator tapered to   018  A 4 [place] single lumen central venous catheter was then advanced under fluoroscopic guidance to the cavoatrial junction  The catheter was cut at 37 cm with 0 cm external length  The position was confirmed fluoroscopically  Blood could be freely aspirated and heparinized saline injected  Patient experienced no untoward reactions  Impression: 1  Status post placement of a 4 [place] single lumen central venous catheter via the right brachial vein with its tip at the cavoatrial junction under ultrasound and fluoroscopic guidance  Workstation performed: [id number]     Echo complete w/ contrast if indicated    Result Date: 3/2/2022  Narrative: Ardeth Needs  Left Ventricle: Left ventricular cavity size is normal  Wall thickness is normal  The left ventricular ejection fraction is 55% by visual estimation  Systolic function is normal  Wall motion is normal  Diastolic function is normal for age    Mitral Valve: There is trace regurgitation    Tricuspid Valve: There is no indirect evidence of pulmonary hypertension    Patient has a mobile echodensity which is attached to sub valvular structures  Differential diagnosis could be redundant cardia but cannot rule out small vegetation consider BUCKY  No significant valvulopathy is noted  BUCKY    Result Date: 3/2/2022  Narrative: Ardeth Needs  Left Ventricle: Left ventricular cavity size is normal  Wall thickness is normal  The left ventricular ejection fraction is 55% by visual estimation  Systolic function is normal  Wall motion is normal    Atrial Septum: There is no atrial septal defect by color Doppler and saline contrast  No patent foramen ovale confirmed at rest    Left Atrial Appendage: There is normal function    Aortic Valve: The aortic valve is trileaflet  The leaflets are mildly thickened  The leaflets are not calcified  The leaflets exhibit normal mobility  No typical vegetation noted no significant associated valvulopathy    Mitral Valve: There is mild focal thickening  Of subvalvular apparatus noted there is a echodensity noted attached to chordee most likely redundant cardia but cannot rule out small vegetation  There is no significant associated valvulopathy    Discussed with ID  Echodensity noted on mitral valve is not typical for vegetation differential diagnosis could be redundant chordae versus small vegetation  It has to be correlated clinically  If he patient continues to have recurrent bacteremia may need a follow-up imaging study  Incidental Findings:   · Imaging finding as above, CT chest abdomen pelvis finding at current hospitalization reviewed with patient and advised for follow-up    Test Results Pending at Discharge (will require follow up):   · As per After Visit Summary     Outpatient Tests Requested:  · CBC, creatinine, vancomycin level while on antibiotics    Complications:  Refer to hospital course and above listed diagnosis related plan, if any    Hospital Course: As per HPI  China Vázquez is a 27 y o  male patient history of ulcerative colitis status post colectomy and ileal pouch, recent hospitalization for bowel site is and polymicrobial bacteremia receiving IV cefazolin as outpatient who originally presented to the hospital on 3/6/2022 due to hives after receiving IV antibiotics  Patient was evaluated in ED noted to have tachycardia possibly SVT on presentation received a dose sign and subsequently started on Cardizem infusion  Patient denied any fever, chest pain, shortness of breath  Reported having ongoing GI symptoms  Patient was evaluated and subsequently admitted  Please see above list of diagnoses and related plan for additional information         Condition at Discharge: stable     Discharge Day Visit / Exam:     Subjective:  Feels well  Tolerating diet  Abdominal pain is stable has not require pain medications since last night and does not wish to continue opiates  Nausea is better, tolerating diet  About 7-8 loose bowel movements in last 24 hours without any blood which is close to his baseline  Denies any fever or chills or rash    Vitals: Blood Pressure: 134/83 (03/12/22 0807)  Pulse: 74 (03/12/22 0807)  Temperature: 97 8 °F (36 6 °C) (03/12/22 5259)  Temp Source: Oral (03/12/22 0807)  Respirations: 18 (03/12/22 0807)  Height: 5' 9" (175 3 cm) (03/06/22 1525)  Weight - Scale: 86 4 kg (190 lb 7 6 oz) (03/06/22 1525)  SpO2: 92 % (03/12/22 0807)  Exam:   Physical Exam  Constitutional:       General: He is not in acute distress  HENT:      Head: Normocephalic and atraumatic  Cardiovascular:      Rate and Rhythm: Normal rate  Pulmonary:      Effort: Pulmonary effort is normal  No respiratory distress  Breath sounds: No wheezing, rhonchi or rales  Chest:      Chest wall: No tenderness  Abdominal:      General: Bowel sounds are normal  There is no distension  Palpations: Abdomen is soft  Tenderness: There is no abdominal tenderness  There is no guarding or rebound  Musculoskeletal:      Cervical back: Normal range of motion and neck supple  Right lower leg: No edema  Left lower leg: No edema  Comments: Right upper extremity PICC line in place   Skin:     General: Skin is warm and dry  Findings: No rash  Neurological:      General: No focal deficit present  Mental Status: He is alert and oriented to person, place, and time  Mental status is at baseline  Cranial Nerves: No cranial nerve deficit  Discharge instructions/Information to patient and family:(Discharge Medications and Follow up):   See after visit summary for information provided to patient and family  Provisions for Follow-Up Care:  See after visit summary for information related to follow-up care and any pertinent home health orders  Disposition: Home    Planned Readmission:  No     Discharge Statement:  I spent 40 minutes discharging the patient  This time was spent on the day of discharge  I had direct contact with the patient on the day of discharge   Greater than 50% of the total time was spent examining patient, answering all patient questions, arranging and discussing plan of care with patient as well as directly providing post-discharge instructions  Additional time then spent on discharge activities  Coordinated with case management  Discussed with GI yesterday regarding follow-up plan  Discharge Medications:  See after visit summary for reconciled discharge medications provided to patient and family  ** Please Note: "This note has been constructed using a voice recognition system  Therefore there may be syntax, spelling, and/or grammatical errors   Please call if you have any questions  "**

## 2022-03-12 NOTE — NURSING NOTE
Discharge, accompanied by Husam Bowman and father, AVS reviewed, and understood, All belongings taken  Masimo removed per protocol, patient resumed with PICC line for long term ABT therapy

## 2022-03-13 NOTE — TELEPHONE ENCOUNTER
----- Message from Monica Ferro MD sent at 3/12/2022  2:27 PM EST -----  Thank you for allowing us to participate in the care of your patient, Ben Dallas, who was hospitalized from 3/6/2022 through 3/12/2022 with the admitting diagnosis of allergic reaction secondary to antibiotics  Reddy Mcdermott was recently hospitalized for enteritis and pouchitis requiring pouchscopy and balloon dilatation, also noted to have polymicrobial bacteremia deemed to be secondary to GI translocation and was discharged on IV cefazolin  Presented with hives and tachycardia after receiving antibiotics , which was thought to be secondary to cefazolin  Antibiotics were changed to IV vancomycin, initially appeared to have possible infusion reaction to vancomycin but subsequently after adjustment in infusion rate patient has been tolerating vancomycin without any difficulties  Patient reported ongoing GI symptoms with lower abdominal pain and diarrhea during hospitalization patient was re-evaluated by GI hydrocortisone enema was stopped and patient was advised treatment with p o  Flagyl , probiotic and symptomatic treatment  Repeat stool studies were negative  Currently tolerating diet, diarrhea at baseline  Denies any bleeding  Tolerating IV antibiotics  Patient was requiring IV opiates during hospitalization which were gradually tapered off  Patient requested not to continue opiate on discharge  Will follow-up with GI and ID after discharge as scheduled  Patient was advised to make a follow-up appointment with [facility] for pouchitis  If you have any additional questions or would like to discuss further, please feel free to contact me      Monica Ferro MD  [place] [address] Internal Medicine, Hospitalist  [phone]

## 2022-03-14 ENCOUNTER — TRANSITIONAL CARE MANAGEMENT (OUTPATIENT)
Dept: FAMILY MEDICINE CLINIC | Facility: CLINIC | Age: 27
End: 2022-03-14

## 2022-03-14 DIAGNOSIS — R78.81 POSITIVE BLOOD CULTURES: Primary | ICD-10-CM

## 2022-03-14 DIAGNOSIS — R78.81 BACTEREMIA: ICD-10-CM

## 2022-03-15 ENCOUNTER — TELEPHONE (OUTPATIENT)
Dept: INFECTIOUS DISEASES | Facility: CLINIC | Age: 27
End: 2022-03-15

## 2022-03-15 NOTE — TELEPHONE ENCOUNTER
[facility] pharmacist Michael called regarding pt VT  She stated it was 26 8, and creat was 0 7  Pt is currently on Vancomycin 1 75g Mumtaz Rivera Ou aware  Per Dr Rivera Ou pt to hold next 2 doses, then restart Vancomycin 1 75g IV every 12H and repeat trough before 4th dose on Friday 3/18  Was on the phone with Kathie Gallego pharmacist and made her aware of changes  Orders faxed to them as well

## 2022-03-17 ENCOUNTER — OFFICE VISIT (OUTPATIENT)
Dept: INFECTIOUS DISEASES | Facility: CLINIC | Age: 27
End: 2022-03-17
Payer: COMMERCIAL

## 2022-03-17 ENCOUNTER — TELEPHONE (OUTPATIENT)
Dept: PALLIATIVE MEDICINE | Facility: CLINIC | Age: 27
End: 2022-03-17

## 2022-03-17 VITALS
BODY MASS INDEX: 26.57 KG/M2 | TEMPERATURE: 98.1 F | HEIGHT: 69 IN | DIASTOLIC BLOOD PRESSURE: 86 MMHG | OXYGEN SATURATION: 99 % | SYSTOLIC BLOOD PRESSURE: 142 MMHG | HEART RATE: 91 BPM | RESPIRATION RATE: 12 BRPM | WEIGHT: 179.4 LBS

## 2022-03-17 DIAGNOSIS — R00.0 TACHYCARDIA: ICD-10-CM

## 2022-03-17 DIAGNOSIS — K91.858: ICD-10-CM

## 2022-03-17 DIAGNOSIS — R78.81 STREPTOCOCCAL BACTEREMIA: Primary | ICD-10-CM

## 2022-03-17 DIAGNOSIS — B95.5 STREPTOCOCCAL BACTEREMIA: Primary | ICD-10-CM

## 2022-03-17 DIAGNOSIS — I33.0 ACUTE BACTERIAL ENDOCARDITIS: ICD-10-CM

## 2022-03-17 PROCEDURE — 99214 OFFICE O/P EST MOD 30 MIN: CPT | Performed by: INTERNAL MEDICINE

## 2022-03-17 PROCEDURE — 1036F TOBACCO NON-USER: CPT | Performed by: INTERNAL MEDICINE

## 2022-03-17 NOTE — TELEPHONE ENCOUNTER
Pt called office to confirm whether his last dose of vancomycin on 3/24 should be an am or pm dose  Please advise

## 2022-03-17 NOTE — PROGRESS NOTES
Progress Note - Infectious Disease   Felipa Lopez 27 y o  male MRN: [id number]  Unit/Bed#:  Encounter: [id number]      Impression/Recommendations:  1  Polymicrobial alpha streptococcal bacteremia, felt to be secondary to translocation from gut   Patient is clinically much improved   Bacteremia had cleared prior to discharge  Vancomycin dose was reduced to q 12 hours dosing due to high vancomycin trough  Otherwise, outpatient labs are acceptable  Continue IV vancomycin, as in below  Treat x4 weeks total, through 3/24  PICC be removed 3/25      2  Possible/presumptive AV endocarditis   2D echo and BUCKY were not definitive but showed possible vegetation at base of AV   No evidence of septic emboli     Antibiotic plan as in above      3  Recent coagulase-negative Staphylococcus bacteremia, felt to be secondary to contaminated blood draw     No antibiotic or further workup needed for this      4  Hives, most likely secondary to cefazolin   Hives have resolved   Will complete antibiotic course with IV vancomycin   Patient is tolerating IV vancomycin well, with no further tachycardia  Monitor for recurrent tachycardia on vanc     5  Tachycardia, resolved  Etiology unclear  Clearly, vancomycin is not etiology tachycardia  Monitor  6  Enteritis/pouchitis  Addition of Flagyl noted   Patient has no further abdominal or rectal discomfort  Monitor abdominal pain  Flagyl per GI  GI follow-up      7  UC, status post colectomy         8  Ankylosing spondylitis, on outpatient Xelijanz  This has been discontinued by patient's rheumatologist      Discussed with patient in detail regarding the above plan     Follow-up with us p r n        Antibiotics:  Vancomycin  Antibiotic # 22   Flagyl # 9     Subjective:  Since discharge this past weekend, patient has felt well  No further abdominal or rectal pain  No further hives  No palpitations  Temperature stays down   No chills  He is tolerating antibiotic well    No nausea, vomiting or diarrhea      The following portions of the patient's history were reviewed and updated as appropriate: allergies, current medications, past medical history, past social history, past surgical history and problem list    ROS:  A complete review of systems was done  Except for what is noted above, the rest of the review of systems was negative  Objective:  Vitals:  [unfilled]  [employer]: Temperature: 98 1 °F (36 7 °C)    Physical Exam:     General: Awake, alert, cooperative, no distress  Neck:  Supple  No lymphadenopathy  No mass  Lungs: Expansion symmetric, no rales, no wheezing, respirations unlabored  Heart:  Regular rate and rhythm, S1 and S2 normal, no murmur  Abdomen: Soft, nondistended, non-tender, bowel sounds active all four quadrants,        no masses, no organomegaly  Extremities: No edema  No erythema/warmth  No ulcer  Nontender to palpation  Skin:  No rash  Neuro: Moves all extremities  Invasive Devices  Report    Peripherally Inserted Central Catheter Line            PICC Line 03/01/22 Right Brachial 15 days                Labs studies:   I have personally reviewed pertinent labs  Results from last 7 days   Lab Units 03/11/22  0557   POTASSIUM mmol/L 3 7   CHLORIDE mmol/L 104   CO2 mmol/L 30   BUN mg/dL 7   CREATININE mg/dL 0 95   EGFR ml/min/1 73sq m 108   CALCIUM mg/dL 8 0*     Results from last 7 days   Lab Units 03/11/22  0557   WBC Thousand/uL 9 36   HEMOGLOBIN g/dL 9 7*   HEMATOCRIT % 32 9*   PLATELETS Thousands/uL 447*           Imaging Studies:   I have personally reviewed pertinent imaging study reports and images in PACS  EKG, Pathology, and Other Studies:   I have personally reviewed pertinent reports

## 2022-03-17 NOTE — PATIENT INSTRUCTIONS
Continue IV vancomycin, as in below  Treat x4 weeks total, through 3/24  PICC removal 3/25 - order sent to visiting nurse  Follow up as needed

## 2022-03-21 ENCOUNTER — TELEPHONE (OUTPATIENT)
Dept: INFECTIOUS DISEASES | Facility: CLINIC | Age: 27
End: 2022-03-21

## 2022-03-21 NOTE — TELEPHONE ENCOUNTER
Received pt vt of 11 9  Dr Mary Beverly made aware  Per Dr Delilah Estrella:    Vancomycin 2g IV Q12  Called and spoke with Fior Chang from [place] and notified him of above  Called and spoke with pt regarding dose change  Pt states that he doses at 10pm and that he should stop the old dose of 1 75mg  To give Vancomycin 2g IV Q12, same end date  He had no further questions      Order faxed to [place]

## 2022-03-22 ENCOUNTER — OFFICE VISIT (OUTPATIENT)
Dept: FAMILY MEDICINE CLINIC | Facility: CLINIC | Age: 27
End: 2022-03-22
Payer: COMMERCIAL

## 2022-03-22 VITALS
OXYGEN SATURATION: 99 % | BODY MASS INDEX: 26.81 KG/M2 | TEMPERATURE: 98.1 F | HEIGHT: 69 IN | RESPIRATION RATE: 16 BRPM | DIASTOLIC BLOOD PRESSURE: 70 MMHG | WEIGHT: 181 LBS | HEART RATE: 87 BPM | SYSTOLIC BLOOD PRESSURE: 100 MMHG

## 2022-03-22 DIAGNOSIS — K91.858: ICD-10-CM

## 2022-03-22 DIAGNOSIS — K91.850 POUCHITIS (HCC): ICD-10-CM

## 2022-03-22 DIAGNOSIS — K51.018 ULCERATIVE CHRONIC PANCOLITIS WITH OTHER COMPLICATION (HCC): ICD-10-CM

## 2022-03-22 DIAGNOSIS — K91.850 ILEAL POUCHITIS (HCC): ICD-10-CM

## 2022-03-22 DIAGNOSIS — I33.0 ACUTE BACTERIAL ENDOCARDITIS: ICD-10-CM

## 2022-03-22 DIAGNOSIS — F41.1 GAD (GENERALIZED ANXIETY DISORDER): ICD-10-CM

## 2022-03-22 DIAGNOSIS — R91.8 ABNORMAL CT SCAN OF LUNG: Primary | ICD-10-CM

## 2022-03-22 DIAGNOSIS — R10.13 DYSPEPSIA: ICD-10-CM

## 2022-03-22 PROBLEM — N17.9 AKI (ACUTE KIDNEY INJURY) (HCC): Status: RESOLVED | Noted: 2019-02-09 | Resolved: 2022-03-22

## 2022-03-22 PROCEDURE — 99495 TRANSJ CARE MGMT MOD F2F 14D: CPT | Performed by: FAMILY MEDICINE

## 2022-03-22 PROCEDURE — 3008F BODY MASS INDEX DOCD: CPT | Performed by: INTERNAL MEDICINE

## 2022-03-22 PROCEDURE — 1111F DSCHRG MED/CURRENT MED MERGE: CPT | Performed by: FAMILY MEDICINE

## 2022-03-22 RX ORDER — OMEPRAZOLE 40 MG/1
40 CAPSULE, DELAYED RELEASE ORAL DAILY
Qty: 30 CAPSULE | Refills: 0 | Status: SHIPPED | OUTPATIENT
Start: 2022-03-22 | End: 2022-06-01

## 2022-03-22 RX ORDER — DULOXETIN HYDROCHLORIDE 60 MG/1
60 CAPSULE, DELAYED RELEASE ORAL DAILY
Qty: 30 CAPSULE | Refills: 5 | Status: SHIPPED | OUTPATIENT
Start: 2022-03-22 | End: 2022-06-10 | Stop reason: SDUPTHER

## 2022-03-22 NOTE — PROGRESS NOTES
Assessment/Plan:    No problem-specific Assessment & Plan notes found for this encounter  Repeat CT chest for clearance of prior RLL finding seen on CT but not CXR    Gi f/u for pouchitis/enteritis    Dyspepsia, offer PPI short term  GI if no better  Recent data suggesting increased risk of ischemic CVA and chronic kidney damage with PPI use was advised  Increase duloxetine 30mg to 60mg/d    Dental prophylaxis due to hx of endocarditis       Diagnoses and all orders for this visit:    Abnormal CT scan of lung  -     CT chest wo contrast; Future    Dyspepsia  -     omeprazole (PriLOSEC) 40 MG capsule; Take 1 capsule (40 mg total) by mouth daily    CAR (generalized anxiety disorder)  -     DULoxetine (CYMBALTA) 60 mg delayed release capsule; Take 1 capsule (60 mg total) by mouth daily    Acute bacterial endocarditis    Pouchitis ([address] )    Ulcerative chronic pancolitis with other complication (HCC)    Complications of intestinal pouch (HCC)    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[address] )        Return if symptoms worsen or fail to improve  Subjective:      Patient ID: Gary Bansal is a 27 y o  male      Chief Complaint   Patient presents with    Transition of Care Management     Chanda Hardwick        HPI    Went to [address] in past  Surgery offered but pt declines  No cough or fever    2d   Postprandial epigastric pain after eating  Blood taste in mouth  Nausea  Low residue   No black stool    Anxiety ongoing, worse lately with illness  Started cymbalta 7 months ago at 30mg/d    TCM Call (since 2/19/2022)     Date and time call was made  3/14/2022  8:32 AM    Hospital care reviewed  Records reviewed        Patient was hospitialized at  [address] [place] [address]        Date of Admission  03/06/22    Date of discharge  03/12/22    Diagnosis  Polymicrobial after streptococcal bacteremia    Disposition  Home    Were the patients medications reviewed and updated  Yes    Current Symptoms  --  "stomach is irritated from the antibiotic"       TCM Call (since 2/19/2022)     Post hospital issues  None    Should patient be enrolled in anticoag monitoring? No    Scheduled for follow up? Yes    Patients specialists  Other (comment)    Other specialists names  Dr Manuel Beebe     Other specialists contcat #  [facility]    Did you obtain your prescribed medications  Yes    Do you need help managing your prescriptions or medications  No    Is transportation to your appointment needed  No    I have advised the patient to call PCP with any new or worsening symptoms  [address] members    Support System  Family    The type of support provided  Physical; Emotional    Do you have social support  Yes, as much as I need    Are you recieving any outpatient services  No    Are you recieving home care services  Yes    Types of home care services  Nurse visit    Interperter language line needed  No    Counseling  Patient    Counseling topics  Activities of daily living; Importance of RX compliance; patient and family education; instructions for management; Risk factor reduction; Home health agency benefits    Comments  Nikia Aviles states that he is doing ok  His stomach feels a little "irritated from the antibiotics" but he denies any severe pain or any fever  He knows to go to the ER if any severe pain or any chest pain, dypsnea and he knows to call Dr Manuel Beebe if any worsening stomach irritation or any fevers, etc Kayla Malhotra            The following portions of the patient's history were reviewed and updated as appropriate: allergies, current medications, past family history, past medical history, past social history, past surgical history and problem list     Review of Systems   Constitutional: Negative for chills and fever           Current Outpatient Medications   Medication Sig Dispense Refill    acetaminophen (TYLENOL) 325 mg tablet Take 2 tablets (650 mg total) by mouth every 6 (six) hours as needed for mild pain 30 tablet 0    dicyclomine (BENTYL) 10 mg capsule Take 1 capsule (10 mg total) by mouth 4 (four) times a day (before meals and at bedtime) 120 capsule 0    DULoxetine (CYMBALTA) 60 mg delayed release capsule Take 1 capsule (60 mg total) by mouth daily 30 capsule 5    iron polysaccharides (FERREX) 150 mg capsule Take 1 capsule (150 mg total) by mouth daily 30 capsule 0    metroNIDAZOLE (FLAGYL) 500 mg tablet Take 1 tablet (500 mg total) by mouth every 12 (twelve) hours for 12 days 24 tablet 0    ondansetron (Zofran ODT) 4 mg disintegrating tablet Take 1 tablet (4 mg total) by mouth every 6 (six) hours as needed for nausea or vomiting 20 tablet 0    saccharomyces boulardii (FLORASTOR) 250 mg capsule Take 1 capsule (250 mg total) by mouth 2 (two) times a day 60 capsule 0    Tofacitinib Citrate ER (Xeljanz XR) 11 MG TB24 Take by mouth daily      vancomycin in sodium chloride 0 9 % 250 mL IVPB 1250 mg q 8 hours  0    omeprazole (PriLOSEC) 40 MG capsule Take 1 capsule (40 mg total) by mouth daily 30 capsule 0     No current facility-administered medications for this visit  Objective:    /70   Pulse 87   Temp 98 1 °F (36 7 °C)   Resp 16   Ht 5' 9" (1 753 m)   Wt 82 1 kg (181 lb)   SpO2 99%   BMI 26 73 kg/m²        Physical Exam  Vitals and nursing note reviewed  Constitutional:       General: He is not in acute distress  Appearance: He is well-developed  He is not ill-appearing  HENT:      Head: Normocephalic  Right Ear: Tympanic membrane normal       Left Ear: Tympanic membrane normal    Eyes:      General: No scleral icterus  Conjunctiva/sclera: Conjunctivae normal    Cardiovascular:      Rate and Rhythm: Normal rate and regular rhythm  Pulmonary:      Effort: Pulmonary effort is normal  No respiratory distress  Breath sounds: No wheezing  Abdominal:      General: There is no distension  Palpations: Abdomen is soft  Musculoskeletal:         General: No deformity        Cervical back: Neck supple  Skin:     General: Skin is warm and dry  Coloration: Skin is not jaundiced or pale  Neurological:      Mental Status: He is alert  Motor: No weakness  Gait: Gait normal    Psychiatric:         Behavior: Behavior normal          Thought Content:  Thought content normal                 Salbador Dover DO

## 2022-03-23 PROCEDURE — 99284 EMERGENCY DEPT VISIT MOD MDM: CPT

## 2022-03-23 PROCEDURE — 99285 EMERGENCY DEPT VISIT HI MDM: CPT | Performed by: EMERGENCY MEDICINE

## 2022-03-24 ENCOUNTER — APPOINTMENT (EMERGENCY)
Dept: RADIOLOGY | Facility: HOSPITAL | Age: 27
End: 2022-03-24
Payer: COMMERCIAL

## 2022-03-24 ENCOUNTER — HOSPITAL ENCOUNTER (EMERGENCY)
Facility: HOSPITAL | Age: 27
Discharge: HOME | End: 2022-03-24
Attending: EMERGENCY MEDICINE | Admitting: EMERGENCY MEDICINE
Payer: COMMERCIAL

## 2022-03-24 VITALS
HEART RATE: 110 BPM | BODY MASS INDEX: 26.73 KG/M2 | DIASTOLIC BLOOD PRESSURE: 71 MMHG | SYSTOLIC BLOOD PRESSURE: 123 MMHG | OXYGEN SATURATION: 97 % | TEMPERATURE: 97 F | RESPIRATION RATE: 20 BRPM | WEIGHT: 181 LBS

## 2022-03-24 DIAGNOSIS — R10.9 ABDOMINAL PAIN: Primary | ICD-10-CM

## 2022-03-24 DIAGNOSIS — K59.00 CONSTIPATION: ICD-10-CM

## 2022-03-24 DIAGNOSIS — K62.89 PROCTITIS: ICD-10-CM

## 2022-03-24 LAB
ALBUMIN SERPL BCP-MCNC: 4 G/DL (ref 3.5–5)
ALP SERPL-CCNC: 83 U/L (ref 46–116)
ALT SERPL W P-5'-P-CCNC: 67 U/L (ref 12–78)
ANION GAP SERPL CALCULATED.3IONS-SCNC: 7 MMOL/L (ref 4–13)
AST SERPL W P-5'-P-CCNC: 38 U/L (ref 5–45)
BASOPHILS # BLD AUTO: 0.18 THOUSANDS/ΜL (ref 0–0.1)
BASOPHILS NFR BLD AUTO: 2 % (ref 0–1)
BILIRUB SERPL-MCNC: 0.67 MG/DL (ref 0.2–1)
BUN SERPL-MCNC: 13 MG/DL (ref 5–25)
CALCIUM SERPL-MCNC: 9.1 MG/DL (ref 8.3–10.1)
CHLORIDE SERPL-SCNC: 103 MMOL/L (ref 100–108)
CO2 SERPL-SCNC: 27 MMOL/L (ref 21–32)
CREAT SERPL-MCNC: 1.15 MG/DL (ref 0.6–1.3)
EOSINOPHIL # BLD AUTO: 0.6 THOUSAND/ΜL (ref 0–0.61)
EOSINOPHIL NFR BLD AUTO: 5 % (ref 0–6)
ERYTHROCYTE [DISTWIDTH] IN BLOOD BY AUTOMATED COUNT: 17.8 % (ref 11.6–15.1)
GFR SERPL CREATININE-BSD FRML MDRD: 86 ML/MIN/1.73SQ M
GLUCOSE SERPL-MCNC: 88 MG/DL (ref 65–140)
HCT VFR BLD AUTO: 38.5 % (ref 36.5–49.3)
HGB BLD-MCNC: 11.2 G/DL (ref 12–17)
IMM GRANULOCYTES # BLD AUTO: 0.04 THOUSAND/UL (ref 0–0.2)
IMM GRANULOCYTES NFR BLD AUTO: 0 % (ref 0–2)
LYMPHOCYTES # BLD AUTO: 2.2 THOUSANDS/ΜL (ref 0.6–4.47)
LYMPHOCYTES NFR BLD AUTO: 18 % (ref 14–44)
MAGNESIUM SERPL-MCNC: 1.8 MG/DL (ref 1.6–2.6)
MCH RBC QN AUTO: 18.7 PG (ref 26.8–34.3)
MCHC RBC AUTO-ENTMCNC: 29.1 G/DL (ref 31.4–37.4)
MCV RBC AUTO: 64 FL (ref 82–98)
MONOCYTES # BLD AUTO: 1.06 THOUSAND/ΜL (ref 0.17–1.22)
MONOCYTES NFR BLD AUTO: 9 % (ref 4–12)
NEUTROPHILS # BLD AUTO: 8.1 THOUSANDS/ΜL (ref 1.85–7.62)
NEUTS SEG NFR BLD AUTO: 66 % (ref 43–75)
NRBC BLD AUTO-RTO: 0 /100 WBCS
PLATELET # BLD AUTO: 400 THOUSANDS/UL (ref 149–390)
PMV BLD AUTO: 8.8 FL (ref 8.9–12.7)
POTASSIUM SERPL-SCNC: 3.4 MMOL/L (ref 3.5–5.3)
PROT SERPL-MCNC: 7.6 G/DL (ref 6.4–8.2)
RBC # BLD AUTO: 5.99 MILLION/UL (ref 3.88–5.62)
SODIUM SERPL-SCNC: 137 MMOL/L (ref 136–145)
WBC # BLD AUTO: 12.18 THOUSAND/UL (ref 4.31–10.16)

## 2022-03-24 PROCEDURE — 83735 ASSAY OF MAGNESIUM: CPT | Performed by: EMERGENCY MEDICINE

## 2022-03-24 PROCEDURE — 74177 CT ABD & PELVIS W/CONTRAST: CPT

## 2022-03-24 PROCEDURE — 96375 TX/PRO/DX INJ NEW DRUG ADDON: CPT

## 2022-03-24 PROCEDURE — 36415 COLL VENOUS BLD VENIPUNCTURE: CPT | Performed by: EMERGENCY MEDICINE

## 2022-03-24 PROCEDURE — 80053 COMPREHEN METABOLIC PANEL: CPT | Performed by: EMERGENCY MEDICINE

## 2022-03-24 PROCEDURE — 85025 COMPLETE CBC W/AUTO DIFF WBC: CPT | Performed by: EMERGENCY MEDICINE

## 2022-03-24 PROCEDURE — 96376 TX/PRO/DX INJ SAME DRUG ADON: CPT

## 2022-03-24 PROCEDURE — G1004 CDSM NDSC: HCPCS

## 2022-03-24 PROCEDURE — 96361 HYDRATE IV INFUSION ADD-ON: CPT

## 2022-03-24 PROCEDURE — 96374 THER/PROPH/DIAG INJ IV PUSH: CPT

## 2022-03-24 RX ORDER — HYDROMORPHONE HCL/PF 1 MG/ML
1 SYRINGE (ML) INJECTION ONCE
Status: COMPLETED | OUTPATIENT
Start: 2022-03-24 | End: 2022-03-24

## 2022-03-24 RX ORDER — MORPHINE SULFATE 4 MG/ML
4 INJECTION, SOLUTION INTRAMUSCULAR; INTRAVENOUS ONCE
Status: COMPLETED | OUTPATIENT
Start: 2022-03-24 | End: 2022-03-24

## 2022-03-24 RX ORDER — ONDANSETRON 2 MG/ML
4 INJECTION INTRAMUSCULAR; INTRAVENOUS ONCE
Status: COMPLETED | OUTPATIENT
Start: 2022-03-24 | End: 2022-03-24

## 2022-03-24 RX ADMIN — MORPHINE SULFATE 4 MG: 4 INJECTION INTRAVENOUS at 03:03

## 2022-03-24 RX ADMIN — SODIUM CHLORIDE 1000 ML: 0.9 INJECTION, SOLUTION INTRAVENOUS at 00:31

## 2022-03-24 RX ADMIN — IOHEXOL 50 ML: 240 INJECTION, SOLUTION INTRATHECAL; INTRAVASCULAR; INTRAVENOUS; ORAL at 00:30

## 2022-03-24 RX ADMIN — IOHEXOL 100 ML: 350 INJECTION, SOLUTION INTRAVENOUS at 02:14

## 2022-03-24 RX ADMIN — ONDANSETRON 4 MG: 2 INJECTION INTRAMUSCULAR; INTRAVENOUS at 02:27

## 2022-03-24 RX ADMIN — HYDROMORPHONE HYDROCHLORIDE 1 MG: 1 INJECTION, SOLUTION INTRAMUSCULAR; INTRAVENOUS; SUBCUTANEOUS at 00:18

## 2022-03-24 RX ADMIN — ONDANSETRON 4 MG: 2 INJECTION INTRAMUSCULAR; INTRAVENOUS at 00:18

## 2022-03-24 RX ADMIN — HYDROMORPHONE HYDROCHLORIDE 1 MG: 1 INJECTION, SOLUTION INTRAMUSCULAR; INTRAVENOUS; SUBCUTANEOUS at 02:27

## 2022-03-24 RX ADMIN — HYDROMORPHONE HYDROCHLORIDE 1 MG: 1 INJECTION, SOLUTION INTRAMUSCULAR; INTRAVENOUS; SUBCUTANEOUS at 01:14

## 2022-03-24 NOTE — ED NOTES
Patient's friend arrived to pick him up  Patient walks out of ED with steady gait        Romana Pica, [address] [facility]  03/24/22 [id number]

## 2022-03-24 NOTE — ED PROVIDER NOTES
History  Chief Complaint   Patient presents with    Abdominal Pain     pt in for bowel obstruction as well as sepsis, sent home with picc line and IV abx  now c/o difficulty going to bathroom as well as tasting blood in is mouth     On flagyl      History provided by:  Patient   used: No    Abdominal Pain  Pain location:  LLQ and RLQ  Pain quality: sharp    Pain radiates to:  Does not radiate  Pain severity:  Moderate  Onset quality:  Gradual  Timing:  Constant  Chronicity:  Recurrent  Relieved by:  Nothing  Worsened by:  Nothing  Ineffective treatments:  None tried  Associated symptoms: constipation    Associated symptoms: no flatus, no nausea and no vomiting    Risk factors comment:  History of ulcerative colitis disease, frequent flares, s/p colectomy      Prior to Admission Medications   Prescriptions Last Dose Informant Patient Reported? Taking?    DULoxetine (CYMBALTA) 60 mg delayed release capsule   No No   Sig: Take 1 capsule (60 mg total) by mouth daily   Tofacitinib Citrate ER (Xeljanz XR) 11 MG TB24  Self Yes No   Sig: Take by mouth daily   acetaminophen (TYLENOL) 325 mg tablet  Self No No   Sig: Take 2 tablets (650 mg total) by mouth every 6 (six) hours as needed for mild pain   dicyclomine (BENTYL) 10 mg capsule  Self No No   Sig: Take 1 capsule (10 mg total) by mouth 4 (four) times a day (before meals and at bedtime)   iron polysaccharides (FERREX) 150 mg capsule  Self No No   Sig: Take 1 capsule (150 mg total) by mouth daily   metroNIDAZOLE (FLAGYL) 500 mg tablet  Self No No   Sig: Take 1 tablet (500 mg total) by mouth every 12 (twelve) hours for 12 days   omeprazole (PriLOSEC) 40 MG capsule   No No   Sig: Take 1 capsule (40 mg total) by mouth daily   ondansetron (Zofran ODT) 4 mg disintegrating tablet  Self No No   Sig: Take 1 tablet (4 mg total) by mouth every 6 (six) hours as needed for nausea or vomiting   saccharomyces boulardii (FLORASTOR) 250 mg capsule  Self No No   Sig: Take 1 capsule (250 mg total) by mouth 2 (two) times a day   vancomycin in sodium chloride 0 9 % 250 mL IVPB  Self No No   Si mg q 8 hours      Facility-Administered Medications: None       Past Medical History:   Diagnosis Date    [facility]yl[facility] ([facility] )     Anxiety     Bowel obstruction (HCC)     Clostridium difficile colitis 2018    Colitis     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[facility] ) 2018    Pancreatitis     Ulcerative colitis ([facility] )        Past Surgical History:   Procedure Laterality Date    COLECTOMY TOTAL      with ileal pouch and anastemosis    IR PICC PLACEMENT SINGLE LUMEN  3/1/2022    TOTAL COLECTOMY         History reviewed  No pertinent family history  I have reviewed and agree with the history as documented  E-Cigarette/Vaping    E-Cigarette Use Never User      E-Cigarette/Vaping Substances    Nicotine No     THC No     CBD No     Flavoring No     Other No     Unknown No      Social History     Tobacco Use    Smoking status: Never Smoker    Smokeless tobacco: Never Used   Vaping Use    Vaping Use: Never used   Substance Use Topics    Alcohol use: Yes     Comment: pt states 5 a month/socially    Drug use: No       Review of Systems   Constitutional: Negative  HENT: Negative  Eyes: Negative  Respiratory: Negative  Cardiovascular: Negative  Gastrointestinal: Positive for abdominal pain and constipation  Negative for flatus, nausea and vomiting  Endocrine: Negative  Genitourinary: Negative  Musculoskeletal: Negative  Skin: Negative  Allergic/Immunologic: Negative  Neurological: Negative  Hematological: Negative  Psychiatric/Behavioral: Negative  All other systems reviewed and are negative  Physical Exam  Physical Exam  Constitutional:       Appearance: Normal appearance  HENT:      Head: Normocephalic and atraumatic        Nose: Nose normal       Mouth/Throat:      Mouth: Mucous membranes are moist    Eyes:      Extraocular Movements: Extraocular movements intact  Pupils: Pupils are equal, round, and reactive to light  Cardiovascular:      Rate and Rhythm: Normal rate and regular rhythm  Pulmonary:      Effort: Pulmonary effort is normal       Breath sounds: Normal breath sounds  Abdominal:      General: Abdomen is flat  Bowel sounds are normal       Palpations: Abdomen is soft  Tenderness: There is abdominal tenderness in the right lower quadrant and left lower quadrant  There is no right CVA tenderness, left CVA tenderness, guarding or rebound  Negative signs include Beatty's sign, Rovsing's sign, McBurney's sign, psoas sign and obturator sign  Musculoskeletal:         General: Normal range of motion  Cervical back: Normal range of motion and neck supple  Skin:     General: Skin is warm  Capillary Refill: Capillary refill takes less than 2 seconds  Neurological:      General: No focal deficit present  Mental Status: He is alert and oriented to person, place, and time  Mental status is at baseline  Psychiatric:         Mood and Affect: Mood normal          Thought Content:  Thought content normal          Vital Signs  ED Triage Vitals   Temperature Pulse Respirations Blood Pressure SpO2   03/24/22 0106 03/23/22 2355 03/23/22 2355 03/23/22 2355 03/23/22 2355   (!) 97 °F (36 1 °C) 88 20 (!) 185/83 98 %      Temp Source Heart Rate Source Patient Position - Orthostatic VS BP Location FiO2 (%)   03/24/22 0106 03/23/22 2355 03/24/22 0227 03/24/22 0227 --   Tympanic Monitor Sitting Left arm       Pain Score       03/23/22 2355       8           Vitals:    03/23/22 2355 03/24/22 0227 03/24/22 0300   BP: (!) 185/83 159/88 123/71   Pulse: 88 100 (!) 110   Patient Position - Orthostatic VS:  Sitting Sitting         Visual Acuity      ED Medications  Medications   sodium chloride 0 9 % bolus 1,000 mL (0 mL Intravenous Stopped 3/24/22 0224)   HYDROmorphone (DILAUDID) injection 1 mg (1 mg Intravenous Given 3/24/22 0018)   ondansetron (ZOFRAN) injection 4 mg (4 mg Intravenous Given 3/24/22 0018)   iohexol (OMNIPAQUE) 240 MG/ML solution 50 mL (50 mL Oral Given 3/24/22 0030)   HYDROmorphone (DILAUDID) injection 1 mg (1 mg Intravenous Given 3/24/22 0114)   iohexol (OMNIPAQUE) 350 MG/ML injection (SINGLE-DOSE) 100 mL (100 mL Intravenous Given 3/24/22 0214)   HYDROmorphone (DILAUDID) injection 1 mg (1 mg Intravenous Given 3/24/22 0227)   ondansetron (ZOFRAN) injection 4 mg (4 mg Intravenous Given 3/24/22 0227)   morphine (PF) 4 mg/mL injection 4 mg (4 mg Intravenous Given 3/24/22 0303)       Diagnostic Studies  Results Reviewed     Procedure Component Value Units Date/Time    Comprehensive metabolic panel [[id number]]  (Abnormal) Collected: 03/24/22 0023    Lab Status: Final result Specimen: Blood from Arm, Right Updated: 03/24/22 0058     Sodium 137 mmol/L      Potassium 3 4 mmol/L      Chloride 103 mmol/L      CO2 27 mmol/L      ANION GAP 7 mmol/L      BUN 13 mg/dL      Creatinine 1 15 mg/dL      Glucose 88 mg/dL      Calcium 9 1 mg/dL      AST 38 U/L      ALT 67 U/L      Alkaline Phosphatase 83 U/L      Total Protein 7 6 g/dL      Albumin 4 0 g/dL      Total Bilirubin 0 67 mg/dL      eGFR 86 ml/min/1 73sq m     Narrative:      [place] guidelines for Chronic Kidney Disease (CKD):     Stage 1 with normal or high GFR (GFR > 90 mL/min/1 73 square meters)    Stage 2 Mild CKD (GFR = 60-89 mL/min/1 73 square meters)    Stage 3A Moderate CKD (GFR = 45-59 mL/min/1 73 square meters)    Stage 3B Moderate CKD (GFR = 30-44 mL/min/1 73 square meters)    Stage 4 Severe CKD (GFR = 15-29 mL/min/1 73 square meters)    Stage 5 End Stage CKD (GFR <15 mL/min/1 73 square meters)  Note: GFR calculation is accurate only with a steady state creatinine    Magnesium [[id number]]  (Normal) Collected: 03/24/22 0023    Lab Status: Final result Specimen: Blood from Arm, Right Updated: 03/24/22 0058     Magnesium 1 8 mg/dL     CBC and differential [[id number]]  (Abnormal) Collected: 03/24/22 0023    Lab Status: Final result Specimen: Blood from Arm, Right Updated: 03/24/22 0038     WBC 12 18 Thousand/uL      RBC 5 99 Million/uL      Hemoglobin 11 2 g/dL      Hematocrit 38 5 %      MCV 64 fL      MCH 18 7 pg      MCHC 29 1 g/dL      RDW 17 8 %      MPV 8 8 fL      Platelets 868 Thousands/uL      nRBC 0 /100 WBCs      Neutrophils Relative 66 %      Immat GRANS % 0 %      Lymphocytes Relative 18 %      Monocytes Relative 9 %      Eosinophils Relative 5 %      Basophils Relative 2 %      Neutrophils Absolute 8 10 Thousands/µL      Immature Grans Absolute 0 04 Thousand/uL      Lymphocytes Absolute 2 20 Thousands/µL      Monocytes Absolute 1 06 Thousand/µL      Eosinophils Absolute 0 60 Thousand/µL      Basophils Absolute 0 18 Thousands/µL                  CT abdomen pelvis with contrast   Final Result by Mark Cobos MD (03/24 0342)      Mildly dilated loops of small bowel, some with air contrast levels compared to prior recent study although enteric contrast noted to reach distal bowel loops at the level of anastomosis  ?  Ileus versus partial obstruction secondary to inflammation in the    setting of IBD  No high-grade obstruction  Consider follow-up/ serial abdominal radiographs as clinically warranted  Above findings discussed with Dr Paulina Lopez at 3:30 AM on 3/24/2022 with verbal confirmation by the recipient  Workstation performed: [id number]                    Procedures  Procedures         ED Course                               SBIRT 22yo+      Most Recent Value   SBIRT (22 yo +)    In order to provide better care to our patients, we are screening all of our patients for alcohol and drug use  Would it be okay to ask you these screening questions?  No Filed at: 03/24/2022 0139                    MDM  Number of Diagnoses or Management Options     Amount and/or Complexity of Data Reviewed  Clinical lab tests: ordered and reviewed  Tests in the radiology section of CPT®: reviewed and ordered  Tests in the medicine section of CPT®: ordered and reviewed    Patient Progress  Patient progress: stable      Disposition  Final diagnoses:   Abdominal pain   Constipation   Proctitis     Time reflects when diagnosis was documented in both MDM as applicable and the Disposition within this note     Time User Action Codes Description Comment    3/24/2022  3:30 AM AnepuRoberto Add [R10 9] Abdominal pain     3/24/2022  3:30 AM AnepuHarrietta Add [K59 00] Constipation     3/24/2022  3:31 AM AnepuHarrietta Add [K62 89] Proctitis       ED Disposition     ED Disposition Condition Date/Time Comment    Discharge Stable Thu Mar 24, 2022  3:30 AM Jocelyne Sofia discharge to home/self care              Follow-up Information     Follow up With Specialties Details Why Contact Info Additional Willian Best 13, DO Family Medicine Schedule an appointment as soon as possible for a visit   [address] [place] [address] Emergency Department Emergency Medicine  If symptoms worsen [address] [address] Emergency Department, [place], [place], [address]          Discharge Medication List as of 3/24/2022  3:31 AM      CONTINUE these medications which have NOT CHANGED    Details   acetaminophen (TYLENOL) 325 mg tablet Take 2 tablets (650 mg total) by mouth every 6 (six) hours as needed for mild pain, Starting Sat 3/12/2022, Normal      dicyclomine (BENTYL) 10 mg capsule Take 1 capsule (10 mg total) by mouth 4 (four) times a day (before meals and at bedtime), Starting Sat 3/12/2022, Normal      DULoxetine (CYMBALTA) 60 mg delayed release capsule Take 1 capsule (60 mg total) by mouth daily, Starting Tue 3/22/2022, Normal      iron polysaccharides (FERREX) 150 mg capsule Take 1 capsule (150 mg total) by mouth daily, Starting Sun 3/13/2022, Normal      metroNIDAZOLE (FLAGYL) 500 mg tablet Take 1 tablet (500 mg total) by mouth every 12 (twelve) hours for 12 days, Starting Sat 3/12/2022, Until Thu 3/24/2022, Normal      omeprazole (PriLOSEC) 40 MG capsule Take 1 capsule (40 mg total) by mouth daily, Starting Tue 3/22/2022, Normal      ondansetron (Zofran ODT) 4 mg disintegrating tablet Take 1 tablet (4 mg total) by mouth every 6 (six) hours as needed for nausea or vomiting, Starting Mon 2/21/2022, Normal      saccharomyces boulardii (FLORASTOR) 250 mg capsule Take 1 capsule (250 mg total) by mouth 2 (two) times a day, Starting Sat 3/12/2022, Normal      Tofacitinib Citrate ER (Xeljanz XR) 11 MG TB24 Take by mouth daily, Historical Med      vancomycin in sodium chloride 0 9 % 250 mL IVPB 1250 mg q 8 hours, No Print             No discharge procedures on file      PDMP Review       Value Time User    PDMP Reviewed  Yes 3/3/2022  2:02 PM Ishan Green DO          ED Provider  Electronically Signed by           Alice Torrez DO  03/24/22 9293

## 2022-03-30 ENCOUNTER — PATIENT OUTREACH (OUTPATIENT)
Dept: CASE MANAGEMENT | Facility: OTHER | Age: 27
End: 2022-03-30

## 2022-03-30 DIAGNOSIS — Z71.89 COMPLEX CARE COORDINATION: Primary | ICD-10-CM

## 2022-03-30 NOTE — PROGRESS NOTES
Patient was referred to the [facility] committee  Patient was reviewed & no care plan at this time  Patient is referred for complex care management as a [employer]

## 2022-03-31 ENCOUNTER — PATIENT OUTREACH (OUTPATIENT)
Dept: FAMILY MEDICINE CLINIC | Facility: CLINIC | Age: 27
End: 2022-03-31

## 2022-03-31 NOTE — PROGRESS NOTES
Received IB referral for Complex Care Management as [employer]  Chart review completed  Call placed to patient  LM on VM with this CM contact number and request for return call

## 2022-04-07 ENCOUNTER — PATIENT OUTREACH (OUTPATIENT)
Dept: FAMILY MEDICINE CLINIC | Facility: CLINIC | Age: 27
End: 2022-04-07

## 2022-04-07 NOTE — PROGRESS NOTES
Call placed to patient  This CM introduced self and explained Care Management services  Miguel Angel Pham reports he's doing well and has everything under control  Declines need for assistance from this CM  This CM will removed Complex Care episode and removed name from care team      This note routed to ZACHARY Bolden CM

## 2022-04-11 ENCOUNTER — TELEPHONE (OUTPATIENT)
Dept: FAMILY MEDICINE CLINIC | Facility: CLINIC | Age: 27
End: 2022-04-11

## 2022-04-11 NOTE — TELEPHONE ENCOUNTER
Could you let him know I am planning to call the insurance company to find out why it was not covered  Please send this message back to me afterward viewing

## 2022-04-12 NOTE — TELEPHONE ENCOUNTER
Pt has a back injury, isn't seeing an orthopedist until Wednesday and wants to know if he can get a prescription for pain, Can't sleep more than 3 hours  Pls call him, was seen by Dr Rice/ workers comp injury  Yes - the patient is able to be screened

## 2022-05-13 ENCOUNTER — HOSPITAL ENCOUNTER (EMERGENCY)
Facility: HOSPITAL | Age: 27
Discharge: HOME | End: 2022-05-14
Attending: EMERGENCY MEDICINE
Payer: COMMERCIAL

## 2022-05-13 ENCOUNTER — APPOINTMENT (EMERGENCY)
Dept: RADIOLOGY | Facility: HOSPITAL | Age: 27
End: 2022-05-13
Payer: COMMERCIAL

## 2022-05-13 DIAGNOSIS — R10.9 ABDOMINAL PAIN: Primary | ICD-10-CM

## 2022-05-13 DIAGNOSIS — K52.9 IBD (INFLAMMATORY BOWEL DISEASE): ICD-10-CM

## 2022-05-13 LAB
ALBUMIN SERPL BCP-MCNC: 3.7 G/DL (ref 3.5–5)
ALP SERPL-CCNC: 88 U/L (ref 46–116)
ALT SERPL W P-5'-P-CCNC: 29 U/L (ref 12–78)
ANION GAP SERPL CALCULATED.3IONS-SCNC: 8 MMOL/L (ref 4–13)
AST SERPL W P-5'-P-CCNC: 14 U/L (ref 5–45)
BASOPHILS # BLD AUTO: 0.09 THOUSANDS/ΜL (ref 0–0.1)
BASOPHILS NFR BLD AUTO: 1 % (ref 0–1)
BILIRUB DIRECT SERPL-MCNC: 0.13 MG/DL (ref 0–0.2)
BILIRUB SERPL-MCNC: 0.45 MG/DL (ref 0.2–1)
BUN SERPL-MCNC: 15 MG/DL (ref 5–25)
CALCIUM SERPL-MCNC: 9.3 MG/DL (ref 8.3–10.1)
CHLORIDE SERPL-SCNC: 100 MMOL/L (ref 100–108)
CO2 SERPL-SCNC: 30 MMOL/L (ref 21–32)
CREAT SERPL-MCNC: 1.22 MG/DL (ref 0.6–1.3)
CRP SERPL QL: 8.5 MG/L
EOSINOPHIL # BLD AUTO: 0.1 THOUSAND/ΜL (ref 0–0.61)
EOSINOPHIL NFR BLD AUTO: 1 % (ref 0–6)
ERYTHROCYTE [DISTWIDTH] IN BLOOD BY AUTOMATED COUNT: 18.4 % (ref 11.6–15.1)
ERYTHROCYTE [SEDIMENTATION RATE] IN BLOOD: 27 MM/HOUR (ref 0–14)
GFR SERPL CREATININE-BSD FRML MDRD: 79 ML/MIN/1.73SQ M
GLUCOSE SERPL-MCNC: 120 MG/DL (ref 65–140)
HCT VFR BLD AUTO: 43.1 % (ref 36.5–49.3)
HGB BLD-MCNC: 12.9 G/DL (ref 12–17)
IMM GRANULOCYTES # BLD AUTO: 0.05 THOUSAND/UL (ref 0–0.2)
IMM GRANULOCYTES NFR BLD AUTO: 0 % (ref 0–2)
LIPASE SERPL-CCNC: 90 U/L (ref 73–393)
LYMPHOCYTES # BLD AUTO: 1.84 THOUSANDS/ΜL (ref 0.6–4.47)
LYMPHOCYTES NFR BLD AUTO: 16 % (ref 14–44)
MCH RBC QN AUTO: 19 PG (ref 26.8–34.3)
MCHC RBC AUTO-ENTMCNC: 29.9 G/DL (ref 31.4–37.4)
MCV RBC AUTO: 64 FL (ref 82–98)
MONOCYTES # BLD AUTO: 1.15 THOUSAND/ΜL (ref 0.17–1.22)
MONOCYTES NFR BLD AUTO: 10 % (ref 4–12)
NEUTROPHILS # BLD AUTO: 8.41 THOUSANDS/ΜL (ref 1.85–7.62)
NEUTS SEG NFR BLD AUTO: 72 % (ref 43–75)
NRBC BLD AUTO-RTO: 0 /100 WBCS
PLATELET # BLD AUTO: 505 THOUSANDS/UL (ref 149–390)
PMV BLD AUTO: 8.6 FL (ref 8.9–12.7)
POTASSIUM SERPL-SCNC: 3.6 MMOL/L (ref 3.5–5.3)
PROT SERPL-MCNC: 7.6 G/DL (ref 6.4–8.2)
RBC # BLD AUTO: 6.79 MILLION/UL (ref 3.88–5.62)
SODIUM SERPL-SCNC: 138 MMOL/L (ref 136–145)
WBC # BLD AUTO: 11.64 THOUSAND/UL (ref 4.31–10.16)

## 2022-05-13 PROCEDURE — 96374 THER/PROPH/DIAG INJ IV PUSH: CPT

## 2022-05-13 PROCEDURE — 36415 COLL VENOUS BLD VENIPUNCTURE: CPT | Performed by: EMERGENCY MEDICINE

## 2022-05-13 PROCEDURE — 80053 COMPREHEN METABOLIC PANEL: CPT | Performed by: EMERGENCY MEDICINE

## 2022-05-13 PROCEDURE — 83690 ASSAY OF LIPASE: CPT | Performed by: EMERGENCY MEDICINE

## 2022-05-13 PROCEDURE — 99285 EMERGENCY DEPT VISIT HI MDM: CPT | Performed by: EMERGENCY MEDICINE

## 2022-05-13 PROCEDURE — 99284 EMERGENCY DEPT VISIT MOD MDM: CPT

## 2022-05-13 PROCEDURE — 85025 COMPLETE CBC W/AUTO DIFF WBC: CPT | Performed by: EMERGENCY MEDICINE

## 2022-05-13 PROCEDURE — 82248 BILIRUBIN DIRECT: CPT | Performed by: EMERGENCY MEDICINE

## 2022-05-13 PROCEDURE — 96376 TX/PRO/DX INJ SAME DRUG ADON: CPT

## 2022-05-13 PROCEDURE — 96361 HYDRATE IV INFUSION ADD-ON: CPT

## 2022-05-13 PROCEDURE — 85652 RBC SED RATE AUTOMATED: CPT | Performed by: EMERGENCY MEDICINE

## 2022-05-13 PROCEDURE — 86140 C-REACTIVE PROTEIN: CPT | Performed by: EMERGENCY MEDICINE

## 2022-05-13 PROCEDURE — 96375 TX/PRO/DX INJ NEW DRUG ADDON: CPT

## 2022-05-13 RX ORDER — MORPHINE SULFATE 4 MG/ML
4 INJECTION, SOLUTION INTRAMUSCULAR; INTRAVENOUS ONCE
Status: COMPLETED | OUTPATIENT
Start: 2022-05-13 | End: 2022-05-13

## 2022-05-13 RX ORDER — METOCLOPRAMIDE HYDROCHLORIDE 5 MG/ML
10 INJECTION INTRAMUSCULAR; INTRAVENOUS ONCE
Status: COMPLETED | OUTPATIENT
Start: 2022-05-13 | End: 2022-05-13

## 2022-05-13 RX ORDER — ONDANSETRON 2 MG/ML
4 INJECTION INTRAMUSCULAR; INTRAVENOUS ONCE
Status: COMPLETED | OUTPATIENT
Start: 2022-05-13 | End: 2022-05-13

## 2022-05-13 RX ORDER — HYDROMORPHONE HCL/PF 1 MG/ML
1 SYRINGE (ML) INJECTION ONCE
Status: COMPLETED | OUTPATIENT
Start: 2022-05-13 | End: 2022-05-13

## 2022-05-13 RX ORDER — ONDANSETRON 2 MG/ML
INJECTION INTRAMUSCULAR; INTRAVENOUS
Status: DISCONTINUED
Start: 2022-05-13 | End: 2022-05-14 | Stop reason: HOSPADM

## 2022-05-13 RX ADMIN — METOCLOPRAMIDE HYDROCHLORIDE 10 MG: 5 INJECTION INTRAMUSCULAR; INTRAVENOUS at 23:42

## 2022-05-13 RX ADMIN — DEXAMETHASONE SODIUM PHOSPHATE 10 MG: 10 INJECTION INTRAMUSCULAR; INTRAVENOUS at 22:45

## 2022-05-13 RX ADMIN — SODIUM CHLORIDE 1000 ML: 0.9 INJECTION, SOLUTION INTRAVENOUS at 22:03

## 2022-05-13 RX ADMIN — HYDROMORPHONE HYDROCHLORIDE 1 MG: 1 INJECTION, SOLUTION INTRAMUSCULAR; INTRAVENOUS; SUBCUTANEOUS at 22:45

## 2022-05-13 RX ADMIN — HYDROMORPHONE HYDROCHLORIDE 1 MG: 1 INJECTION, SOLUTION INTRAMUSCULAR; INTRAVENOUS; SUBCUTANEOUS at 23:43

## 2022-05-13 RX ADMIN — IOHEXOL 50 ML: 240 INJECTION, SOLUTION INTRATHECAL; INTRAVASCULAR; INTRAVENOUS; ORAL at 22:45

## 2022-05-13 RX ADMIN — MORPHINE SULFATE 4 MG: 4 INJECTION INTRAVENOUS at 22:06

## 2022-05-13 RX ADMIN — ONDANSETRON 4 MG: 2 INJECTION INTRAMUSCULAR; INTRAVENOUS at 23:32

## 2022-05-13 RX ADMIN — ONDANSETRON 4 MG: 2 INJECTION INTRAMUSCULAR; INTRAVENOUS at 22:06

## 2022-05-14 ENCOUNTER — APPOINTMENT (EMERGENCY)
Dept: RADIOLOGY | Facility: HOSPITAL | Age: 27
End: 2022-05-14
Payer: COMMERCIAL

## 2022-05-14 VITALS
SYSTOLIC BLOOD PRESSURE: 145 MMHG | RESPIRATION RATE: 16 BRPM | TEMPERATURE: 98.7 F | DIASTOLIC BLOOD PRESSURE: 93 MMHG | OXYGEN SATURATION: 99 % | HEART RATE: 84 BPM

## 2022-05-14 PROCEDURE — 74176 CT ABD & PELVIS W/O CONTRAST: CPT

## 2022-05-14 PROCEDURE — G1004 CDSM NDSC: HCPCS

## 2022-05-14 PROCEDURE — 96361 HYDRATE IV INFUSION ADD-ON: CPT

## 2022-05-14 PROCEDURE — 96375 TX/PRO/DX INJ NEW DRUG ADDON: CPT

## 2022-05-14 RX ORDER — PREDNISONE 20 MG/1
TABLET ORAL
Qty: 14 TABLET | Refills: 0 | Status: SHIPPED | OUTPATIENT
Start: 2022-05-14 | End: 2022-05-26

## 2022-05-14 RX ORDER — ONDANSETRON 4 MG/1
4 TABLET, ORALLY DISINTEGRATING ORAL EVERY 6 HOURS PRN
Qty: 20 TABLET | Refills: 0 | Status: SHIPPED | OUTPATIENT
Start: 2022-05-14 | End: 2022-06-01

## 2022-05-14 RX ORDER — KETAMINE HCL IN NACL, ISO-OSM 100MG/10ML
0.3 SYRINGE (ML) INJECTION ONCE
Status: COMPLETED | OUTPATIENT
Start: 2022-05-14 | End: 2022-05-14

## 2022-05-14 RX ORDER — MORPHINE SULFATE 30 MG/1
15 TABLET ORAL EVERY 4 HOURS PRN
Qty: 10 TABLET | Refills: 0 | Status: SHIPPED | OUTPATIENT
Start: 2022-05-14 | End: 2022-05-19

## 2022-05-14 RX ADMIN — Medication 25 MG: at 00:47

## 2022-05-14 NOTE — ED PROVIDER NOTES
Final Diagnosis:  1  Abdominal pain    2  IBD (inflammatory bowel disease)        Chief Complaint   Patient presents with    Abdominal Pain    Dizziness     HPI  [id number]    Complicated history including prior or so ulcerative colitis hemicolectomy ileal pouch revision    He has had multiple bowel obstructions as complication of his prior surgery  Also has a history of pancreatitis  Since his ulcerative colitis he has not had recurrence of his IBD issues other than surgical complications    Patient has had abdominal pain with diarrhea which today changed to no more stooling  He wants to make sure he doesn't have obstruction  Also with NBNB emesis  Has had eval in [place] for this and they discussed revision, possible failure rate high and doesn't want colostomy  trialing conservative right now  - No language barrier    - History obtained from patient  - There are no limitations to the history obtained  - Previous charting underwent limited review with attention to last ED visits, labs, ekgs, and prior imaging  PMH:   has a past medical history of Ankylosing spondylitis ([facility] [address] ), Anxiety, Bowel obstruction ([facility] [address] ), Clostridium difficile colitis (9/13/2018), Colitis, 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[facility] [address] ) (9/13/2018), Pancreatitis, and Ulcerative colitis ([facility] [address] )  PSH:   has a past surgical history that includes Total colectomy; COLECTOMY TOTAL; and IR PICC placement single lumen (3/1/2022)  Social History:    Tobacco Use: Low Risk     Smoking Tobacco Use: Never Smoker    Smokeless Tobacco Use: Never Used     Alcohol Use: Unknown    Frequency of Alcohol Consumption: Monthly or less    Average Number of Drinks: Not on file    Frequency of Binge Drinking: Not on file     Patient with no illicit use    ROS:    Pertinent positives/negatives:   Review of Systems   Gastrointestinal: Positive for abdominal pain, constipation, diarrhea, nausea and vomiting  Negative for abdominal distention, blood in stool and rectal pain  CONSTITUTIONAL:  No dizziness  No weakness  No unexpected weight loss  EYES:  No pain, erythema, or discharge  No loss of vision  ENT:  No tinnitus, decreased hearing  No epistaxis/purulent drainage  No voice change, airway closing, trismus  CARDIOVASCULAR:  No chest pain  No palpitations  No new lower extremity edema  RESPIRATORY:  No purulent cough  No hemoptysis  No dyspnea  No paroxysmal nocturnal dyspnea  No stridor, audible wheezing bedside  GASTROINTESTINAL:  Normal appetite  No vomiting, diarrhea  No pain  No bloating  No melena  GENITOURINARY:  No frequency, urgency, nocturia  No hematuria or dysuria  No discharge  No sores/adenopathy  MUSCULOSKELETAL:  No arthralgias or myalgias that are new  INTEGUMENTARY:  No swelling  No unexpected contusions  No abrasions  No lymphangitis  NEUROLOGIC:  No meningismus  No numbness of the extremities  No new focal weakness  No postural instability  PSYCHIATRIC:  No SI HI AVH  HEMATOLOGICAL:  No bleeding  No petechiae  No bruising  ALLERGIES:  No urticaria  No sudden abd cramping  No stridor  PE:     Physical exam highlights:   Physical Exam       Vitals:    05/13/22 2157 05/13/22 2208 05/14/22 0000 05/14/22 0045   BP: 144/99  138/76 145/93   BP Location: Right arm  Left arm    Pulse: 68  67 84   Resp: 18  18 16   Temp:  98 7 °F (37 1 °C)     SpO2: 98%  99% 99%     Vitals reviewed by me  Nursing note reviewed  Chaperone present for all sensitive exam   Const: No acute distress  Alert  Nontoxic  Not diaphoretic  HEENT: External ears normal  No protrusion drainage swelling  Nose normal  No drainage/traumatic deformity  MMM  Mouth with baseline/symmetric movement  No trismus  Eyes: No squinting  No icterus  Tracks through the room with normal EOM  No tearing/swelling/drainage  Neck: ROM normal  No rigidity  No meningismus  Cards: Rate as per vitals  Compared to monitor sinus unless documented above  Regular  Well perfused    Pulm: able to verbalize without additional effort  Effort and excursion normal  No disress  No audible wheezing/ stridor  Normal resp rate  Abd: No distension beyond baseline  No fluctuant wave  Patient without peritoneal pain with shifting/bumping the bed  MSK: ROM normal and baseline  No deformity  Skin: No new rashes visible  Well perfused  Prior surgical scars  Neuro: Nonfocal  Baseline  CN grossly intact  Moving all four with coordination  Psych: Normal behavior and affect  A:  - Nursing note reviewed  Ddx and MDM  SBO  IBD flare  CT ab pelv with oral contrast  inflamm markers elevated  Steroids  Pain control    Refractory ketamine because patient has opioid tolerance  CT abdomen pelvis wo contrast   Final Result      Unremarkable appearance following prior colectomy  No evidence of obstruction or large intra-abdominal collection              Workstation performed: [id number]           Orders Placed This Encounter   Procedures    CT abdomen pelvis wo contrast    CBC and differential    Comprehensive metabolic panel    Lipase    Sedimentation rate, automated    C-reactive protein    Bilirubin, direct     Labs Reviewed   CBC AND DIFFERENTIAL - Abnormal       Result Value Ref Range Status    WBC 11 64 (*) 4 31 - 10 16 Thousand/uL Final    RBC 6 79 (*) 3 88 - 5 62 Million/uL Final    Hemoglobin 12 9  12 0 - 17 0 g/dL Final    Hematocrit 43 1  36 5 - 49 3 % Final    MCV 64 (*) 82 - 98 fL Final    MCH 19 0 (*) 26 8 - 34 3 pg Final    MCHC 29 9 (*) 31 4 - 37 4 g/dL Final    RDW 18 4 (*) 11 6 - 15 1 % Final    MPV 8 6 (*) 8 9 - 12 7 fL Final    Platelets 151 (*) 504 - 390 Thousands/uL Final    nRBC 0  /100 WBCs Final    Neutrophils Relative 72  43 - 75 % Final    Immat GRANS % 0  0 - 2 % Final    Lymphocytes Relative 16  14 - 44 % Final    Monocytes Relative 10  4 - 12 % Final    Eosinophils Relative 1  0 - 6 % Final    Basophils Relative 1  0 - 1 % Final    Neutrophils Absolute 8 41 (*) 1 85 - 7 62 Thousands/µL Final    Immature Grans Absolute 0 05  0 00 - 0 20 Thousand/uL Final    Lymphocytes Absolute 1 84  0 60 - 4 47 Thousands/µL Final    Monocytes Absolute 1 15  0 17 - 1 22 Thousand/µL Final    Eosinophils Absolute 0 10  0 00 - 0 61 Thousand/µL Final    Basophils Absolute 0 09  0 00 - 0 10 Thousands/µL Final   SEDIMENTATION RATE - Abnormal    Sed Rate 27 (*) 0 - 14 mm/hour Final   C-REACTIVE PROTEIN - Abnormal    CRP 8 5 (*) <3 0 mg/L Final   LIPASE - Normal    Lipase 90  73 - 393 u/L Final   BILIRUBIN, DIRECT - Normal    Bilirubin, Direct 0 13  0 00 - 0 20 mg/dL Final   COMPREHENSIVE METABOLIC PANEL    Sodium 088  136 - 145 mmol/L Final    Potassium 3 6  3 5 - 5 3 mmol/L Final    Chloride 100  100 - 108 mmol/L Final    CO2 30  21 - 32 mmol/L Final    ANION GAP 8  4 - 13 mmol/L Final    BUN 15  5 - 25 mg/dL Final    Creatinine 1 22  0 60 - 1 30 mg/dL Final    Comment: Standardized to IDMS reference method    Glucose 120  65 - 140 mg/dL Final    Comment: If the patient is fasting, the ADA then defines impaired fasting glucose as > 100 mg/dL and diabetes as > or equal to 123 mg/dL  Specimen collection should occur prior to Sulfasalazine administration due to the potential for falsely depressed results  Specimen collection should occur prior to Sulfapyridine administration due to the potential for falsely elevated results  Calcium 9 3  8 3 - 10 1 mg/dL Final    AST 14  5 - 45 U/L Final    Comment: Specimen collection should occur prior to Sulfasalazine administration due to the potential for falsely depressed results  ALT 29  12 - 78 U/L Final    Comment: Specimen collection should occur prior to Sulfasalazine administration due to the potential for falsely depressed results       Alkaline Phosphatase 88  46 - 116 U/L Final    Total Protein 7 6  6 4 - 8 2 g/dL Final    Albumin 3 7  3 5 - 5 0 g/dL Final    Total Bilirubin 0 45  0 20 - 1 00 mg/dL Final    Comment: Use of this assay is not recommended for patients undergoing treatment with eltrombopag due to the potential for falsely elevated results  eGFR 79  ml/min/1 73sq m Final    Narrative:     [place] guidelines for Chronic Kidney Disease (CKD):     Stage 1 with normal or high GFR (GFR > 90 mL/min/1 73 square meters)    Stage 2 Mild CKD (GFR = 60-89 mL/min/1 73 square meters)    Stage 3A Moderate CKD (GFR = 45-59 mL/min/1 73 square meters)    Stage 3B Moderate CKD (GFR = 30-44 mL/min/1 73 square meters)    Stage 4 Severe CKD (GFR = 15-29 mL/min/1 73 square meters)    Stage 5 End Stage CKD (GFR <15 mL/min/1 73 square meters)  Note: GFR calculation is accurate only with a steady state creatinine       Final Diagnosis:  1  Abdominal pain    2   IBD (inflammatory bowel disease)        P:  - hospital tx includes   Medications   ondansetron (ZOFRAN) injection 4 mg (4 mg Intravenous Given 5/13/22 2206)   morphine (PF) 4 mg/mL injection 4 mg (4 mg Intravenous Given 5/13/22 2206)   sodium chloride 0 9 % bolus 1,000 mL (0 mL Intravenous Stopped 5/14/22 0140)   HYDROmorphone (DILAUDID) injection 1 mg (1 mg Intravenous Given 5/13/22 2245)   dexamethasone oral liquid 10 mg 1 mL (10 mg Oral Given 5/13/22 2245)   iohexol (OMNIPAQUE) 240 MG/ML solution 50 mL (50 mL Oral Given 5/13/22 2245)   ondansetron (ZOFRAN) injection 4 mg (4 mg Intravenous Given 5/13/22 2332)   metoclopramide (REGLAN) injection 10 mg (10 mg Intravenous Given 5/13/22 2342)   HYDROmorphone (DILAUDID) injection 1 mg (1 mg Intravenous Given 5/13/22 2343)   Ketamine HCl 25 mg (25 mg Intravenous Given 5/14/22 0047)         - disposition  Time reflects when diagnosis was documented in both MDM as applicable and the Disposition within this note     Time User Action Codes Description Comment    5/14/2022  1:22 AM Annabelle Long Add [R10 9] Abdominal pain     5/14/2022  1:22 AM Annabelle Long Add [K52 9] IBD (inflammatory bowel disease)       ED Disposition     ED Disposition Discharge    Condition   Stable    Date/Time   Sat May 14, 2022  1:22 AM    Comment   Janice Duarte discharge to home/self care  Follow-up Information     Follow up With Specialties Details Why [id number],  Family Medicine   [address] [place] [address]   [phone]            - patient will call their PCP to let them know they were in the emergency department  We discuss return precautions       - additional tx intended, if consistent with primary provider:  - patient to follow with :      Discharge Medication List as of 5/14/2022  1:28 AM      START taking these medications    Details   morphine (MSIR) 30 MG tablet Take 0 5 tablets (15 mg total) by mouth every 4 (four) hours as needed for severe pain for up to 5 days Max Daily Amount: 90 mg, Starting Sat 5/14/2022, Until Thu 5/19/2022 at 2359, Normal      predniSONE 20 mg tablet Multiple Dosages:Starting Sat 5/14/2022, Until Tue 5/17/2022 at 2359, THEN Starting Wed 5/18/2022, Until Sat 5/21/2022 at 2359, THEN Starting Sun 5/22/2022, Until Wed 5/25/2022 at 2359Take 2 tablets (40 mg total) by mouth daily for 4 days, THEN 1 tab let (20 mg total) daily for 4 days, THEN 0 5 tablets (10 mg total) daily for 4 days  , Normal         CONTINUE these medications which have NOT CHANGED    Details   acetaminophen (TYLENOL) 325 mg tablet Take 2 tablets (650 mg total) by mouth every 6 (six) hours as needed for mild pain, Starting Sat 3/12/2022, Normal      dicyclomine (BENTYL) 10 mg capsule Take 1 capsule (10 mg total) by mouth 4 (four) times a day (before meals and at bedtime), Starting Sat 3/12/2022, Normal      DULoxetine (CYMBALTA) 60 mg delayed release capsule Take 1 capsule (60 mg total) by mouth daily, Starting Tue 3/22/2022, Normal      iron polysaccharides (FERREX) 150 mg capsule Take 1 capsule (150 mg total) by mouth daily, Starting Sun 3/13/2022, Normal      omeprazole (PriLOSEC) 40 MG capsule Take 1 capsule (40 mg total) by mouth daily, Starting Tue 3/22/2022, Normal      ondansetron (Zofran ODT) 4 mg disintegrating tablet Take 1 tablet (4 mg total) by mouth every 6 (six) hours as needed for nausea or vomiting, Starting Mon 2/21/2022, Normal      saccharomyces boulardii (FLORASTOR) 250 mg capsule Take 1 capsule (250 mg total) by mouth 2 (two) times a day, Starting Sat 3/12/2022, Normal      Tofacitinib Citrate ER (Xeljanz XR) 11 MG TB24 Take by mouth daily, Historical Med           No discharge procedures on file  Prior to Admission Medications   Prescriptions Last Dose Informant Patient Reported? Taking? DULoxetine (CYMBALTA) 60 mg delayed release capsule   No No   Sig: Take 1 capsule (60 mg total) by mouth daily   Tofacitinib Citrate ER (Xeljanz XR) 11 MG TB24  Self Yes No   Sig: Take by mouth daily   acetaminophen (TYLENOL) 325 mg tablet  Self No No   Sig: Take 2 tablets (650 mg total) by mouth every 6 (six) hours as needed for mild pain   dicyclomine (BENTYL) 10 mg capsule  Self No No   Sig: Take 1 capsule (10 mg total) by mouth 4 (four) times a day (before meals and at bedtime)   iron polysaccharides (FERREX) 150 mg capsule  Self No No   Sig: Take 1 capsule (150 mg total) by mouth daily   omeprazole (PriLOSEC) 40 MG capsule   No No   Sig: Take 1 capsule (40 mg total) by mouth daily   ondansetron (Zofran ODT) 4 mg disintegrating tablet  Self No No   Sig: Take 1 tablet (4 mg total) by mouth every 6 (six) hours as needed for nausea or vomiting   saccharomyces boulardii (FLORASTOR) 250 mg capsule  Self No No   Sig: Take 1 capsule (250 mg total) by mouth 2 (two) times a day      Facility-Administered Medications: None       Portions of the record may have been created with voice recognition software  Occasional wrong word or "sound a like" substitutions may have occurred due to the inherent limitations of voice recognition software   Read the chart carefully and recognize, using context, where substitutions have occurred      Electronically signed by:  MD Kelly Fischer MD  05/14/22 0151

## 2022-05-30 ENCOUNTER — HOSPITAL ENCOUNTER (INPATIENT)
Facility: HOSPITAL | Age: 27
LOS: 2 days | Discharge: HOME | DRG: 178 | End: 2022-06-01
Attending: EMERGENCY MEDICINE | Admitting: FAMILY MEDICINE
Payer: COMMERCIAL

## 2022-05-30 ENCOUNTER — APPOINTMENT (EMERGENCY)
Dept: RADIOLOGY | Facility: HOSPITAL | Age: 27
DRG: 178 | End: 2022-05-30
Payer: COMMERCIAL

## 2022-05-30 DIAGNOSIS — K91.850 POUCHITIS (HCC): ICD-10-CM

## 2022-05-30 DIAGNOSIS — K62.4 RECTAL OBSTRUCTION: Primary | ICD-10-CM

## 2022-05-30 DIAGNOSIS — Z86.19 HISTORY OF CLOSTRIDIUM DIFFICILE INFECTION: ICD-10-CM

## 2022-05-30 DIAGNOSIS — M45.9 ANKYLOSING SPONDYLITIS (HCC): ICD-10-CM

## 2022-05-30 PROBLEM — K56.609 BOWEL OBSTRUCTION (HCC): Status: ACTIVE | Noted: 2022-05-30

## 2022-05-30 PROBLEM — U07.1 COVID-19 VIRUS INFECTION: Status: ACTIVE | Noted: 2022-05-30

## 2022-05-30 LAB
ALBUMIN SERPL BCP-MCNC: 4 G/DL (ref 3.5–5)
ALP SERPL-CCNC: 85 U/L (ref 46–116)
ALT SERPL W P-5'-P-CCNC: 46 U/L (ref 12–78)
ANION GAP SERPL CALCULATED.3IONS-SCNC: 9 MMOL/L (ref 4–13)
AST SERPL W P-5'-P-CCNC: 21 U/L (ref 5–45)
BASOPHILS # BLD AUTO: 0.15 THOUSANDS/ΜL (ref 0–0.1)
BASOPHILS NFR BLD AUTO: 1 % (ref 0–1)
BILIRUB SERPL-MCNC: 0.47 MG/DL (ref 0.2–1)
BUN SERPL-MCNC: 15 MG/DL (ref 5–25)
CALCIUM SERPL-MCNC: 9.2 MG/DL (ref 8.3–10.1)
CHLORIDE SERPL-SCNC: 104 MMOL/L (ref 100–108)
CO2 SERPL-SCNC: 26 MMOL/L (ref 21–32)
CREAT SERPL-MCNC: 1.15 MG/DL (ref 0.6–1.3)
EOSINOPHIL # BLD AUTO: 0.23 THOUSAND/ΜL (ref 0–0.61)
EOSINOPHIL NFR BLD AUTO: 2 % (ref 0–6)
ERYTHROCYTE [DISTWIDTH] IN BLOOD BY AUTOMATED COUNT: 18.1 % (ref 11.6–15.1)
FLUAV RNA RESP QL NAA+PROBE: NEGATIVE
FLUBV RNA RESP QL NAA+PROBE: NEGATIVE
GFR SERPL CREATININE-BSD FRML MDRD: 85 ML/MIN/1.73SQ M
GLUCOSE SERPL-MCNC: 88 MG/DL (ref 65–140)
HCT VFR BLD AUTO: 40.5 % (ref 36.5–49.3)
HGB BLD-MCNC: 12.1 G/DL (ref 12–17)
IMM GRANULOCYTES # BLD AUTO: 0.06 THOUSAND/UL (ref 0–0.2)
IMM GRANULOCYTES NFR BLD AUTO: 1 % (ref 0–2)
LIPASE SERPL-CCNC: 177 U/L (ref 73–393)
LYMPHOCYTES # BLD AUTO: 2.44 THOUSANDS/ΜL (ref 0.6–4.47)
LYMPHOCYTES NFR BLD AUTO: 20 % (ref 14–44)
MCH RBC QN AUTO: 18.9 PG (ref 26.8–34.3)
MCHC RBC AUTO-ENTMCNC: 29.9 G/DL (ref 31.4–37.4)
MCV RBC AUTO: 63 FL (ref 82–98)
MONOCYTES # BLD AUTO: 1.1 THOUSAND/ΜL (ref 0.17–1.22)
MONOCYTES NFR BLD AUTO: 9 % (ref 4–12)
NEUTROPHILS # BLD AUTO: 7.98 THOUSANDS/ΜL (ref 1.85–7.62)
NEUTS SEG NFR BLD AUTO: 67 % (ref 43–75)
NRBC BLD AUTO-RTO: 0 /100 WBCS
PLATELET # BLD AUTO: 505 THOUSANDS/UL (ref 149–390)
PMV BLD AUTO: 8.9 FL (ref 8.9–12.7)
POTASSIUM SERPL-SCNC: 3.6 MMOL/L (ref 3.5–5.3)
PROT SERPL-MCNC: 7.7 G/DL (ref 6.4–8.2)
RBC # BLD AUTO: 6.4 MILLION/UL (ref 3.88–5.62)
RSV RNA RESP QL NAA+PROBE: NEGATIVE
SARS-COV-2 RNA RESP QL NAA+PROBE: POSITIVE
SODIUM SERPL-SCNC: 139 MMOL/L (ref 136–145)
WBC # BLD AUTO: 11.96 THOUSAND/UL (ref 4.31–10.16)

## 2022-05-30 PROCEDURE — 83690 ASSAY OF LIPASE: CPT | Performed by: EMERGENCY MEDICINE

## 2022-05-30 PROCEDURE — 0241U HB NFCT DS VIR RESP RNA 4 TRGT: CPT | Performed by: EMERGENCY MEDICINE

## 2022-05-30 PROCEDURE — 99222 1ST HOSP IP/OBS MODERATE 55: CPT | Performed by: INTERNAL MEDICINE

## 2022-05-30 PROCEDURE — 99285 EMERGENCY DEPT VISIT HI MDM: CPT | Performed by: EMERGENCY MEDICINE

## 2022-05-30 PROCEDURE — 80053 COMPREHEN METABOLIC PANEL: CPT | Performed by: EMERGENCY MEDICINE

## 2022-05-30 PROCEDURE — 8E0ZXY6 ISOLATION: ICD-10-PCS | Performed by: FAMILY MEDICINE

## 2022-05-30 PROCEDURE — 96361 HYDRATE IV INFUSION ADD-ON: CPT

## 2022-05-30 PROCEDURE — 85025 COMPLETE CBC W/AUTO DIFF WBC: CPT | Performed by: EMERGENCY MEDICINE

## 2022-05-30 PROCEDURE — 96374 THER/PROPH/DIAG INJ IV PUSH: CPT

## 2022-05-30 PROCEDURE — 96376 TX/PRO/DX INJ SAME DRUG ADON: CPT

## 2022-05-30 PROCEDURE — 99223 1ST HOSP IP/OBS HIGH 75: CPT | Performed by: INTERNAL MEDICINE

## 2022-05-30 PROCEDURE — 36415 COLL VENOUS BLD VENIPUNCTURE: CPT | Performed by: EMERGENCY MEDICINE

## 2022-05-30 PROCEDURE — 96375 TX/PRO/DX INJ NEW DRUG ADDON: CPT

## 2022-05-30 PROCEDURE — 74176 CT ABD & PELVIS W/O CONTRAST: CPT

## 2022-05-30 PROCEDURE — 99285 EMERGENCY DEPT VISIT HI MDM: CPT

## 2022-05-30 PROCEDURE — G1004 CDSM NDSC: HCPCS

## 2022-05-30 RX ORDER — DULOXETIN HYDROCHLORIDE 30 MG/1
60 CAPSULE, DELAYED RELEASE ORAL DAILY
Status: DISCONTINUED | OUTPATIENT
Start: 2022-05-30 | End: 2022-06-01 | Stop reason: HOSPADM

## 2022-05-30 RX ORDER — HYDROMORPHONE HCL/PF 1 MG/ML
1 SYRINGE (ML) INJECTION ONCE
Status: COMPLETED | OUTPATIENT
Start: 2022-05-30 | End: 2022-05-30

## 2022-05-30 RX ORDER — HYDROMORPHONE HCL/PF 1 MG/ML
0.5 SYRINGE (ML) INJECTION EVERY 4 HOURS PRN
Status: DISCONTINUED | OUTPATIENT
Start: 2022-05-30 | End: 2022-06-01 | Stop reason: HOSPADM

## 2022-05-30 RX ORDER — ONDANSETRON 2 MG/ML
4 INJECTION INTRAMUSCULAR; INTRAVENOUS EVERY 6 HOURS PRN
Status: DISCONTINUED | OUTPATIENT
Start: 2022-05-30 | End: 2022-06-01 | Stop reason: HOSPADM

## 2022-05-30 RX ORDER — FAMOTIDINE 10 MG/ML
20 INJECTION, SOLUTION INTRAVENOUS EVERY 12 HOURS SCHEDULED
Status: DISCONTINUED | OUTPATIENT
Start: 2022-05-30 | End: 2022-06-01 | Stop reason: HOSPADM

## 2022-05-30 RX ORDER — HYDROMORPHONE HCL/PF 1 MG/ML
1 SYRINGE (ML) INJECTION EVERY 4 HOURS PRN
Status: DISCONTINUED | OUTPATIENT
Start: 2022-05-30 | End: 2022-06-01 | Stop reason: HOSPADM

## 2022-05-30 RX ORDER — FENTANYL CITRATE 50 UG/ML
100 INJECTION, SOLUTION INTRAMUSCULAR; INTRAVENOUS ONCE
Status: COMPLETED | OUTPATIENT
Start: 2022-05-30 | End: 2022-05-30

## 2022-05-30 RX ORDER — METOCLOPRAMIDE HYDROCHLORIDE 5 MG/ML
10 INJECTION INTRAMUSCULAR; INTRAVENOUS ONCE
Status: COMPLETED | OUTPATIENT
Start: 2022-05-30 | End: 2022-05-30

## 2022-05-30 RX ORDER — SULFASALAZINE 500 MG/1
1000 TABLET ORAL 2 TIMES DAILY
Status: DISCONTINUED | OUTPATIENT
Start: 2022-05-30 | End: 2022-06-01 | Stop reason: HOSPADM

## 2022-05-30 RX ORDER — SULFASALAZINE 500 MG/1
500 TABLET ORAL DAILY
Status: DISCONTINUED | OUTPATIENT
Start: 2022-05-30 | End: 2022-05-30 | Stop reason: DRUGHIGH

## 2022-05-30 RX ORDER — SODIUM CHLORIDE, SODIUM LACTATE, POTASSIUM CHLORIDE, CALCIUM CHLORIDE 600; 310; 30; 20 MG/100ML; MG/100ML; MG/100ML; MG/100ML
100 INJECTION, SOLUTION INTRAVENOUS CONTINUOUS
Status: DISCONTINUED | OUTPATIENT
Start: 2022-05-30 | End: 2022-06-01 | Stop reason: HOSPADM

## 2022-05-30 RX ORDER — SULFASALAZINE 500 MG/1
500 TABLET ORAL 2 TIMES DAILY
COMMUNITY
End: 2022-06-01

## 2022-05-30 RX ADMIN — HYDROMORPHONE HYDROCHLORIDE 1 MG: 1 INJECTION, SOLUTION INTRAMUSCULAR; INTRAVENOUS; SUBCUTANEOUS at 09:03

## 2022-05-30 RX ADMIN — SULFASALAZINE 1000 MG: 500 TABLET ORAL at 17:39

## 2022-05-30 RX ADMIN — Medication 125 MG: at 11:17

## 2022-05-30 RX ADMIN — PIPERACILLIN AND TAZOBACTAM 3.38 G: 36; 4.5 INJECTION, POWDER, FOR SOLUTION INTRAVENOUS at 17:39

## 2022-05-30 RX ADMIN — FENTANYL CITRATE 100 MCG: 50 INJECTION, SOLUTION INTRAMUSCULAR; INTRAVENOUS at 06:42

## 2022-05-30 RX ADMIN — SODIUM CHLORIDE, SODIUM LACTATE, POTASSIUM CHLORIDE, AND CALCIUM CHLORIDE 100 ML/HR: .6; .31; .03; .02 INJECTION, SOLUTION INTRAVENOUS at 08:30

## 2022-05-30 RX ADMIN — METOCLOPRAMIDE HYDROCHLORIDE 10 MG: 5 INJECTION INTRAMUSCULAR; INTRAVENOUS at 02:41

## 2022-05-30 RX ADMIN — PIPERACILLIN AND TAZOBACTAM 3.38 G: 3; .375 INJECTION, POWDER, LYOPHILIZED, FOR SOLUTION INTRAVENOUS at 05:17

## 2022-05-30 RX ADMIN — Medication 125 MG: at 21:58

## 2022-05-30 RX ADMIN — HYDROMORPHONE HYDROCHLORIDE 1 MG: 1 INJECTION, SOLUTION INTRAMUSCULAR; INTRAVENOUS; SUBCUTANEOUS at 02:40

## 2022-05-30 RX ADMIN — HYDROMORPHONE HYDROCHLORIDE 1 MG: 1 INJECTION, SOLUTION INTRAMUSCULAR; INTRAVENOUS; SUBCUTANEOUS at 17:40

## 2022-05-30 RX ADMIN — FENTANYL CITRATE 100 MCG: 50 INJECTION, SOLUTION INTRAMUSCULAR; INTRAVENOUS at 02:59

## 2022-05-30 RX ADMIN — FAMOTIDINE 20 MG: 10 INJECTION INTRAVENOUS at 11:17

## 2022-05-30 RX ADMIN — SODIUM CHLORIDE 1000 ML: 0.9 INJECTION, SOLUTION INTRAVENOUS at 02:39

## 2022-05-30 RX ADMIN — FAMOTIDINE 20 MG: 10 INJECTION INTRAVENOUS at 21:48

## 2022-05-30 RX ADMIN — SULFASALAZINE 1000 MG: 500 TABLET ORAL at 11:30

## 2022-05-30 RX ADMIN — HYDROMORPHONE HYDROCHLORIDE 1 MG: 1 INJECTION, SOLUTION INTRAMUSCULAR; INTRAVENOUS; SUBCUTANEOUS at 21:47

## 2022-05-30 RX ADMIN — PIPERACILLIN AND TAZOBACTAM 3.38 G: 36; 4.5 INJECTION, POWDER, FOR SOLUTION INTRAVENOUS at 11:17

## 2022-05-30 RX ADMIN — PIPERACILLIN AND TAZOBACTAM 3.38 G: 36; 4.5 INJECTION, POWDER, FOR SOLUTION INTRAVENOUS at 23:24

## 2022-05-30 RX ADMIN — HYDROMORPHONE HYDROCHLORIDE 1 MG: 1 INJECTION, SOLUTION INTRAMUSCULAR; INTRAVENOUS; SUBCUTANEOUS at 13:40

## 2022-05-30 RX ADMIN — ONDANSETRON 4 MG: 2 INJECTION INTRAMUSCULAR; INTRAVENOUS at 11:22

## 2022-05-30 RX ADMIN — DULOXETINE HYDROCHLORIDE 60 MG: 30 CAPSULE, DELAYED RELEASE ORAL at 11:17

## 2022-05-30 RX ADMIN — HYDROMORPHONE HYDROCHLORIDE 1 MG: 1 INJECTION, SOLUTION INTRAMUSCULAR; INTRAVENOUS; SUBCUTANEOUS at 06:17

## 2022-05-30 RX ADMIN — HYDROMORPHONE HYDROCHLORIDE 1 MG: 1 INJECTION, SOLUTION INTRAMUSCULAR; INTRAVENOUS; SUBCUTANEOUS at 04:09

## 2022-05-30 NOTE — ED PROVIDER NOTES
History  Chief Complaint   Patient presents with    Abdominal Pain     Patient c/o severe abdominal pain started a few hours ago, patient unable to sit comfortably, last bowel movement was 16:30     HPI      This is a 1-SPIY-YVL male with complicated history of ulcerative colitis, status post total colectomy and J pouch ileoanal anastomosis in 2015 with subsequent chronic stricture  Patient does have a pouch in his rectum she states around 1630 he started with abdominal pain and distension  Patient states severe abdominal pain and nausea  On arrival patient is tachycardic uncomfortable  Abdomen is tender to palpation  Patient states he is not passing any gas  Patient has been admitted multiple times in the hospital due to obstructions  No other complaints of fevers or chills  32 year male who presents with abdominal pain concern for possible obstruction will talk to consultants we will get a CT along with lab work  March 1st:  Recently underwent endoscopic pouchoscopy on March 1st with evidence of erythema, edema and multiple small ulcerations throughout the pouch as well as proximal stricture which was dilated to 20 mm with a through the scope balloon      Prior to Admission Medications   Prescriptions Last Dose Informant Patient Reported? Taking?    DULoxetine (CYMBALTA) 60 mg delayed release capsule   No No   Sig: Take 1 capsule (60 mg total) by mouth daily   Tofacitinib Citrate ER (Xeljanz XR) 11 MG TB24  Self Yes No   Sig: Take by mouth daily   acetaminophen (TYLENOL) 325 mg tablet  Self No No   Sig: Take 2 tablets (650 mg total) by mouth every 6 (six) hours as needed for mild pain   dicyclomine (BENTYL) 10 mg capsule Not Taking at Unknown time Self No No   Sig: Take 1 capsule (10 mg total) by mouth 4 (four) times a day (before meals and at bedtime)   Patient not taking: Reported on 5/30/2022   iron polysaccharides (FERREX) 150 mg capsule Not Taking at Unknown time Self No No   Sig: Take 1 capsule (150 mg total) by mouth daily   Patient not taking: Reported on 5/30/2022   omeprazole (PriLOSEC) 40 MG capsule Not Taking at Unknown time  No No   Sig: Take 1 capsule (40 mg total) by mouth daily   Patient not taking: Reported on 5/30/2022   ondansetron (Zofran ODT) 4 mg disintegrating tablet Not Taking at Unknown time Self No No   Sig: Take 1 tablet (4 mg total) by mouth every 6 (six) hours as needed for nausea or vomiting   Patient not taking: Reported on 5/30/2022   ondansetron (Zofran ODT) 4 mg disintegrating tablet Not Taking at Unknown time  No No   Sig: Take 1 tablet (4 mg total) by mouth every 6 (six) hours as needed for nausea or vomiting   Patient not taking: Reported on 5/30/2022   saccharomyces boulardii (FLORASTOR) 250 mg capsule Not Taking at Unknown time Self No No   Sig: Take 1 capsule (250 mg total) by mouth 2 (two) times a day   Patient not taking: Reported on 5/30/2022      Facility-Administered Medications: None       Past Medical History:   Diagnosis Date    [facility]ylosing [facility]ondylitis ([facility] )     Anxiety     Bowel obstruction (HCC)     Clostridium difficile colitis 9/13/2018    Colitis     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[facility] ) 9/13/2018    Pancreatitis     Ulcerative colitis ([facility] )        Past Surgical History:   Procedure Laterality Date    COLECTOMY TOTAL      with ileal pouch and anastemosis    IR PICC PLACEMENT SINGLE LUMEN  3/1/2022    TOTAL COLECTOMY         History reviewed  No pertinent family history  I have reviewed and agree with the history as documented      E-Cigarette/Vaping    E-Cigarette Use Never User      E-Cigarette/Vaping Substances    Nicotine No     THC No     CBD No     Flavoring No     Other No     Unknown No      Social History     Tobacco Use    Smoking status: Never Smoker    Smokeless tobacco: Never Used   Vaping Use    Vaping Use: Never used   Substance Use Topics    Alcohol use: Yes     Comment: pt states 5 a month/socially    Drug use: No       Review of Systems   Constitutional: Negative  Negative for diaphoresis and fever  HENT: Negative  Respiratory: Negative  Negative for cough, shortness of breath and wheezing  Cardiovascular: Negative  Negative for chest pain, palpitations and leg swelling  Gastrointestinal: Positive for abdominal pain, constipation and nausea  Negative for abdominal distention and vomiting  Genitourinary: Negative  Musculoskeletal: Negative  Skin: Negative  Neurological: Negative  Psychiatric/Behavioral: Negative  All other systems reviewed and are negative  Physical Exam  Physical Exam  Vitals reviewed  Constitutional:       General: He is in acute distress  Appearance: He is well-developed  He is ill-appearing  He is not diaphoretic  HENT:      Head: Normocephalic and atraumatic  Nose: Nose normal    Eyes:      Conjunctiva/sclera: Conjunctivae normal       Pupils: Pupils are equal, round, and reactive to light  Cardiovascular:      Rate and Rhythm: Normal rate and regular rhythm  Heart sounds: Normal heart sounds  No murmur heard  Pulmonary:      Effort: Pulmonary effort is normal  No respiratory distress  Breath sounds: Normal breath sounds  No wheezing or rales  Abdominal:      General: Bowel sounds are normal  There is no distension  Palpations: Abdomen is soft  Tenderness: There is generalized abdominal tenderness  There is guarding  There is no rebound  Musculoskeletal:         General: No tenderness or deformity  Normal range of motion  Cervical back: Normal range of motion and neck supple  Skin:     General: Skin is warm and dry  Coloration: Skin is not pale  Findings: No rash  Neurological:      Mental Status: He is alert and oriented to person, place, and time  Cranial Nerves: No cranial nerve deficit           Vital Signs  ED Triage Vitals   Temperature Pulse Respirations Blood Pressure SpO2   05/30/22 0326 05/30/22 0242 05/30/22 0242 05/30/22 0242 05/30/22 0242   98 °F (36 7 °C) 95 20 136/94 94 %      Temp src Heart Rate Source Patient Position - Orthostatic VS BP Location FiO2 (%)   -- -- 05/30/22 0242 05/30/22 0242 --     Lying Right arm       Pain Score       05/30/22 0240       10 - Worst Possible Pain           Vitals:    05/30/22 0242   BP: 136/94   Pulse: 95   Patient Position - Orthostatic VS: Lying         Visual Acuity      ED Medications  Medications   piperacillin-tazobactam (ZOSYN) IVPB 3 375 g (3 375 g Intravenous New Bag 5/30/22 0517)   sodium chloride 0 9 % bolus 1,000 mL (0 mL Intravenous Stopped 5/30/22 0517)   HYDROmorphone (DILAUDID) injection 1 mg (1 mg Intravenous Given 5/30/22 0240)   metoclopramide (REGLAN) injection 10 mg (10 mg Intravenous Given 5/30/22 0241)   fentanyl citrate (PF) 100 MCG/2ML 100 mcg (100 mcg Intravenous Given 5/30/22 0259)   HYDROmorphone (DILAUDID) injection 1 mg (1 mg Intravenous Given 5/30/22 0409)       Diagnostic Studies  Results Reviewed     Procedure Component Value Units Date/Time    COVID/FLU/RSV - 2 hour TAT [[id number]]  (Abnormal) Collected: 05/30/22 0412    Lab Status: Final result Specimen: Nares from Nose Updated: 05/30/22 0507     SARS-CoV-2 Positive     INFLUENZA A PCR Negative     INFLUENZA B PCR Negative     RSV PCR Negative    Narrative:      FOR PEDIATRIC PATIENTS - copy/paste COVID Guidelines URL to browser: [email or web address]    SARS-CoV-2 assay is a Nucleic Acid Amplification assay intended for the  qualitative detection of nucleic acid from SARS-CoV-2 in nasopharyngeal  swabs  Results are for the presumptive identification of SARS-CoV-2 RNA  Positive results are indicative of infection with SARS-CoV-2, the virus  causing COVID-19, but do not rule out bacterial infection or co-infection  with other viruses   Laboratories within the [place] and its  territories are required to report all positive results to the appropriate  public health authorities  Negative results do not preclude SARS-CoV-2  infection and should not be used as the sole basis for treatment or other  patient management decisions  Negative results must be combined with  clinical observations, patient history, and epidemiological information  This test has not been FDA cleared or approved  This test has been authorized by FDA under an Emergency Use Authorization  (EUA)  This test is only authorized for the duration of time the  declaration that circumstances exist justifying the authorization of the  emergency use of an in vitro diagnostic tests for detection of SARS-CoV-2  virus and/or diagnosis of COVID-19 infection under section 564(b)(1) of  the Act, 21 U  S C  426FQL-8(J)(8), unless the authorization is terminated  or revoked sooner  The test has been validated but independent review by FDA  and CLIA is pending  Test performed using [employer] GeneXpert: This RT-PCR assay targets N2,  a region unique to SARS-CoV-2  A conserved region in the E-gene was chosen  for pan-Sarbecovirus detection which includes SARS-CoV-2      Comprehensive metabolic panel [[id number]] Collected: 05/30/22 0242    Lab Status: Final result Specimen: Blood from Arm, Right Updated: 05/30/22 0305     Sodium 139 mmol/L      Potassium 3 6 mmol/L      Chloride 104 mmol/L      CO2 26 mmol/L      ANION GAP 9 mmol/L      BUN 15 mg/dL      Creatinine 1 15 mg/dL      Glucose 88 mg/dL      Calcium 9 2 mg/dL      AST 21 U/L      ALT 46 U/L      Alkaline Phosphatase 85 U/L      Total Protein 7 7 g/dL      Albumin 4 0 g/dL      Total Bilirubin 0 47 mg/dL      eGFR 85 ml/min/1 73sq m     Narrative:      [place] guidelines for Chronic Kidney Disease (CKD):     Stage 1 with normal or high GFR (GFR > 90 mL/min/1 73 square meters)    Stage 2 Mild CKD (GFR = 60-89 mL/min/1 73 square meters)    Stage 3A Moderate CKD (GFR = 45-59 mL/min/1 73 square meters)    Stage 3B Moderate CKD (GFR = 30-44 mL/min/1 73 square meters)    Stage 4 Severe CKD (GFR = 15-29 mL/min/1 73 square meters)    Stage 5 End Stage CKD (GFR <15 mL/min/1 73 square meters)  Note: GFR calculation is accurate only with a steady state creatinine    Lipase [[id number]]  (Normal) Collected: 05/30/22 0242    Lab Status: Final result Specimen: Blood from Arm, Right Updated: 05/30/22 0305     Lipase 177 u/L     CBC and differential [[id number]]  (Abnormal) Collected: 05/30/22 0242    Lab Status: Final result Specimen: Blood from Arm, Right Updated: 05/30/22 0250     WBC 11 96 Thousand/uL      RBC 6 40 Million/uL      Hemoglobin 12 1 g/dL      Hematocrit 40 5 %      MCV 63 fL      MCH 18 9 pg      MCHC 29 9 g/dL      RDW 18 1 %      MPV 8 9 fL      Platelets 565 Thousands/uL      nRBC 0 /100 WBCs      Neutrophils Relative 67 %      Immat GRANS % 1 %      Lymphocytes Relative 20 %      Monocytes Relative 9 %      Eosinophils Relative 2 %      Basophils Relative 1 %      Neutrophils Absolute 7 98 Thousands/µL      Immature Grans Absolute 0 06 Thousand/uL      Lymphocytes Absolute 2 44 Thousands/µL      Monocytes Absolute 1 10 Thousand/µL      Eosinophils Absolute 0 23 Thousand/µL      Basophils Absolute 0 15 Thousands/µL     UA (URINE) with reflex to Scope [[id number]]     Lab Status: No result Specimen: Urine                  CT abdomen pelvis wo contrast    (Results Pending)              Procedures  Procedures         ED Course  ED Course as of 05/30/22 0522   Mon May 30, 2022   0344 CT Vrad finding: partial colonic resection with presumed enterocolic anastomsis  Finding suggest a rectal obstruction at level of surgical anastomsis  4869 Spoke with Dr Bekah Ho who recommended colorectal surgery contact    3327 Colorectal refused patient     Spoke with GI who recommended NPO, ABx and will see patient in morning                                              MDM    Disposition  Final diagnoses:   Rectal obstruction     Time reflects when diagnosis was documented in both MDM as applicable and the Disposition within this note     Time User Action Codes Description Comment    5/30/2022  4:46 AM Swetha Chun Add [K62 4] Rectal obstruction       ED Disposition     ED Disposition   Admit    Condition   Stable    Date/Time   Mon May 30, 2022  4:45 AM    Comment   Case was discussed with medicine and the patient's admission status was agreed to be Admission Status: inpatient status to the service of Dr Gely Stover   Follow-up Information    None         Patient's Medications   Discharge Prescriptions    No medications on file       No discharge procedures on file      PDMP Review       Value Time User    PDMP Reviewed  Yes 3/3/2022  2:02  [facility],           ED Provider  Electronically Signed by           Michael Schrader MD  05/30/22 4421

## 2022-05-30 NOTE — CONSULTS
Consultation - [facility]) [facility]  Melissa Diaz 32 y o  male MRN: [id number]  Unit/Bed#: [facility] 307-01 Encounter: [id number]        Consults    ASSESSMENT/PLAN:     1  Rectal obstruction-appears to be partial as patient is passing gas and abdominal exam is relatively benign with soft abdomen and present bowel sounds  He has also had a small bowel movement  He does have mild tenderness however   -would recommend to empirically start patient on treatment for pouchitis, previous pouchoscopy was notable for erythema/edema and ulcerations  -will plan for non emergent pouchoscopy tomorrow with possible dilation-sooner if patient becomes clinically obstructed   -keep patient NPO for now  -recommend IV fluid resuscitation   -continue antiemetics as needed   -patient will need to follow-up with colorectal surgeon in the outpatient setting as he is having frequent episodes of rectal obstructions at the level of anastomosis  -plan discussed with patient  2  Labs are reviewed, he has mild leukocytosis with WBC of 11 9, platelets 747, electrolytes are normal   Lipase was normal 177  Liver enzymes were normal   COVID-19 positive    ______________________________________________________________________    Reason for Consult / Principal Problem: Rectal obstruction    HPI: Melissa Diaz is a 32y o  year old male who presents with Rectal obstruction   This is a 68-year-old male with history of ulcerative colitis status post total colectomy with J-pouch ileoanal anastomosis in 2015 followed by chronic recurrent strictures, ankylosing spondylitis on tofacitinab with baseline 5-7 bowel movements on regular basis presented to the hospital with inability to pass stool last night  Patient reports that he had trouble going to the bathroom, followed by some lower abdominal pain, which prompted the visit to the emergency room  He denies any rectal bleeding  Denies fever    He feels mildly nauseous but denies any vomiting  He recently underwent pouchoscopy 3 months ago with dilation which was notable for erythema/edema and multiple small ulcerations throughout the pouch consistent with pouchitis and a proximal stricture which was dilated to 20 mm with TTS balloon  Patient reports that he typically requires 1 dilation a year however this year he has had 3 dilations  He only has 1 documented dilation here  At the time of this consultation, patient is passing gas, he reports having small bowel movement  This was nonbloody  He is lying in the bed comfortably  Abdominal exam is benign, there is no guarding or rigidity, mild tenderness in the lower abdomen, abdomen is otherwise soft  Bowel sounds are present  Patient tested positive for COVID-19 infection  He is afebrile  CT Vrad finding:  Partial colonic resection with presumed enterocolonic anastomosis  Finding suggest rectal obstruction at level of anastomosis  Review of Systems: The remainder of the review of systems was negative except for the pertinent positives noted in HPI  Historical Information   Past Medical History:   Diagnosis Date    [facility]     Anxiety     Bowel obstruction (HCC)     Clostridium difficile colitis 9/13/2018    Colitis     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[facility] ) 9/13/2018    Pancreatitis     Ulcerative colitis ([facility] )      Past Surgical History:   Procedure Laterality Date    COLECTOMY TOTAL      with ileal pouch and anastemosis    IR PICC PLACEMENT SINGLE LUMEN  3/1/2022    TOTAL COLECTOMY       Social History   Social History     Substance and Sexual Activity   Alcohol Use Yes    Comment: pt states 5 a month/socially     Social History     Substance and Sexual Activity   Drug Use No     Social History     Tobacco Use   Smoking Status Never Smoker   Smokeless Tobacco Never Used     History reviewed  No pertinent family history      Meds/Allergies     Medications Prior to Admission   Medication    sulfaSALAzine (AZULFIDINE) 500 mg tablet    acetaminophen (TYLENOL) 325 mg tablet    dicyclomine (BENTYL) 10 mg capsule    DULoxetine (CYMBALTA) 60 mg delayed release capsule    iron polysaccharides (FERREX) 150 mg capsule    omeprazole (PriLOSEC) 40 MG capsule    ondansetron (Zofran ODT) 4 mg disintegrating tablet    ondansetron (Zofran ODT) 4 mg disintegrating tablet    saccharomyces boulardii (FLORASTOR) 250 mg capsule    Tofacitinib Citrate ER (Xeljanz XR) 11 MG TB24     Current Facility-Administered Medications   Medication Dose Route Frequency    DULoxetine (CYMBALTA) delayed release capsule 60 mg  60 mg Oral Daily    Famotidine (PF) (PEPCID) injection 20 mg  20 mg Intravenous Q12H [facility]    HYDROmorphone (DILAUDID) injection 0 5 mg  0 5 mg Intravenous Q4H PRN    Or    HYDROmorphone (DILAUDID) injection 1 mg  1 mg Intravenous Q4H PRN    lactated ringers infusion  100 mL/hr Intravenous Continuous    ondansetron (ZOFRAN) injection 4 mg  4 mg Intravenous Q6H PRN    piperacillin-tazobactam (ZOSYN) IVPB 3 375 g  3 375 g Intravenous Q6H    sulfaSALAzine (AZULFIDINE) tablet 1,000 mg  1,000 mg Oral Daily    sulfaSALAzine (AZULFIDINE) tablet 500 mg  500 mg Oral Daily    vancomycin (VANCOCIN) oral solution 125 mg  125 mg Oral Q12H [facility]       Allergies   Allergen Reactions    Mesalamine GI Intolerance     Other reaction(s): Pancreatitis  [facility] - [id number]: pancreatitis    Cefazolin Hives       Objective     Blood pressure 142/93, pulse 87, temperature 98 °F (36 7 °C), resp  rate 14, weight 82 1 kg (181 lb), SpO2 95 %  Intake/Output Summary (Last 24 hours) at 5/30/2022 1026  Last data filed at 5/30/2022 0608  Gross per 24 hour   Intake 1100 ml   Output --   Net 1100 ml       PHYSICAL EXAM     GEN: well nourished, well developed, no acute distress  HEENT: anicteric  CV: RRR, no m/r/g  CHEST: CTA b/l, no WRR  ABD: +BS, soft, mild tenderness on palpation in the lower abdomen, no guarding or rigidity    EXT: no c/c/e  SKIN: no rashes,  NEURO: aaox3    Lab Results:   Admission on 05/30/2022   Component Date Value    WBC 05/30/2022 11 96 (A)    RBC 05/30/2022 6 40 (A)    Hemoglobin 05/30/2022 12 1     Hematocrit 05/30/2022 40 5     MCV 05/30/2022 63 (A)    MCH 05/30/2022 18 9 (A)    MCHC 05/30/2022 29 9 (A)    RDW 05/30/2022 18 1 (A)    MPV 05/30/2022 8 9     Platelets 16/95/1931 505 (A)    nRBC 05/30/2022 0     Neutrophils Relative 05/30/2022 67     Immat GRANS % 05/30/2022 1     Lymphocytes Relative 05/30/2022 20     Monocytes Relative 05/30/2022 9     Eosinophils Relative 05/30/2022 2     Basophils Relative 05/30/2022 1     Neutrophils Absolute 05/30/2022 7 98 (A)    Immature Grans Absolute 05/30/2022 0 06     Lymphocytes Absolute 05/30/2022 2 44     Monocytes Absolute 05/30/2022 1 10     Eosinophils Absolute 05/30/2022 0 23     Basophils Absolute 05/30/2022 0 15 (A)    Sodium 05/30/2022 139     Potassium 05/30/2022 3 6     Chloride 05/30/2022 104     CO2 05/30/2022 26     ANION GAP 05/30/2022 9     BUN 05/30/2022 15     Creatinine 05/30/2022 1 15     Glucose 05/30/2022 88     Calcium 05/30/2022 9 2     AST 05/30/2022 21     ALT 05/30/2022 46     Alkaline Phosphatase 05/30/2022 85     Total Protein 05/30/2022 7 7     Albumin 05/30/2022 4 0     Total Bilirubin 05/30/2022 0 47     eGFR 05/30/2022 85     Lipase 05/30/2022 177     SARS-CoV-2 05/30/2022 Positive (A)    INFLUENZA A PCR 05/30/2022 Negative     INFLUENZA B PCR 05/30/2022 Negative     RSV PCR 05/30/2022 Negative      Imaging Studies: I have personally reviewed pertinent films in PACS

## 2022-05-30 NOTE — H&P
Stephanie 47 1995, 32 y o  male MRN: [id number]  Unit/Bed#: ED CT2 Encounter: [id number]  Primary Care Provider: Wilhemena Koyanagi, DO   Date and time admitted to hospital: 5/30/2022  2:24 AM    * Rectal obstruction  Assessment & Plan  Patient presents with acute onset localized abdominal pain below umbilical region  Reports constant sharp pain and feeling bloated  · History of ulcerative colitis, status post partial colectomy and ileal pouch formation with stricture of rectum  Normally has 8-10 loose to watery BM per day  Had 1 BM around 430pm yesterday  · CT showed - partial colonic resection with presumed enterocolic anastomosis, residual distal colon is distended to 8 cm with gas and fecal material  Findings suggest rectal obstruction at the level of surgical anastomosis  No free air  · WBC 11 96, patient afebrile  · ED provider discussed case with GI, keep patient NPO, antibiotic, for sigmoidoscopy today  · Patient received IV Zosyn in ED  Will continue  · IV hydration  · Pain control  · NPO  · Consult GI      COVID-19 virus infection  Assessment & Plan  Patient asymptomatic  Received COVID vaccine, no booster  Monitor COVID symptoms  History of ulcerative colitis  Assessment & Plan  Status post surgery as above  Ankylosing spondylitis (HCC)  Assessment & Plan  Continue sulfasalazine  Patient was started on sulfasalazine 500mg p o  B i d  For 2 weeks, then 2 tabs in the morning and 1 tab in p m  For 3 weeks, then 2 tabs b i d  on 5/9 by her rheumatologist   Will continue sulfasalazine    CAR (generalized anxiety disorder)  Assessment & Plan  Continue Cymbalta        VTE Prophylaxis: Low risk  / sequential compression device   Code Status:  Full code  POLST: POLST form is not discussed and not completed at this time  Anticipated Length of Stay:  Patient will be admitted on an Inpatient basis with an anticipated length of stay of  > 2 midnights  Justification for Hospital Stay:  Rectal obstruction    Total Time for Visit, including Counseling / Coordination of Care: 45 minutes  Greater than 50% of this total time spent on direct patient counseling and coordination of care  Chief Complaint:   Acute abdominal pain    History of Present Illness:    Jazmyn Nguyen is a 32 y o  male with PMH of UC, status post extensive surgery,annkylosing spondylitis, bacteremia, anxiety who presents with acute abdominal pain started while he was working tonight  Reports pain is constant, located below umbilicus, sharp, denies nausea vomiting  Reports feeling bloated  Reports normally has 8-10 loose to watery BM per day  Last BM was 4:30 p m  which was very abnormal for him  Patient denies cough, SOB, generalized weakness  Denies COVID symptoms  Review of Systems:    Review of Systems   Gastrointestinal: Positive for abdominal pain  Feeling bloated   Musculoskeletal:        Chronic back pain   All other systems reviewed and are negative  Past Medical and Surgical History:     Past Medical History:   Diagnosis Date    [facility]ylosing spondyl[facility] ([facility] [address] )     Anxiety     Bowel obstruction (HCC)     Clostridium difficile colitis 9/13/2018    Colitis     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[facility] ) 9/13/2018    Pancreatitis     Ulcerative colitis ([facility] )        Past Surgical History:   Procedure Laterality Date    COLECTOMY TOTAL      with ileal pouch and anastemosis    IR PICC PLACEMENT SINGLE LUMEN  3/1/2022    TOTAL COLECTOMY         Meds/Allergies:    Prior to Admission medications    Medication Sig Start Date End Date Taking? Authorizing Provider   sulfaSALAzine (AZULFIDINE) 500 mg tablet Take 500 mg by mouth 2 (two) times a day Take 2 tabs in a m , 1 tab in p m  for 3 weeks, then 2 tabs b i d     Yes Historical Provider, MD   acetaminophen (TYLENOL) 325 mg tablet Take 2 tablets (650 mg total) by mouth every 6 (six) hours as needed for mild pain 3/12/22   Leonardo Lobo MD   dicyclomine (BENTYL) 10 mg capsule Take 1 capsule (10 mg total) by mouth 4 (four) times a day (before meals and at bedtime)  Patient not taking: Reported on 5/30/2022 3/12/22   Bambi Babinski, MD   DULoxetine (CYMBALTA) 60 mg delayed release capsule Take 1 capsule (60 mg total) by mouth daily 3/22/22   Wilhemena Koyanagi, DO   iron polysaccharides (FERREX) 150 mg capsule Take 1 capsule (150 mg total) by mouth daily  Patient not taking: Reported on 5/30/2022 3/13/22   Bambi Babinski, MD   omeprazole (PriLOSEC) 40 MG capsule Take 1 capsule (40 mg total) by mouth daily  Patient not taking: Reported on 5/30/2022 3/22/22   Wilhemena Koyanagi, DO   ondansetron (Zofran ODT) 4 mg disintegrating tablet Take 1 tablet (4 mg total) by mouth every 6 (six) hours as needed for nausea or vomiting  Patient not taking: Reported on 5/30/2022 2/21/22   Elton Salvador MD   ondansetron (Zofran ODT) 4 mg disintegrating tablet Take 1 tablet (4 mg total) by mouth every 6 (six) hours as needed for nausea or vomiting  Patient not taking: Reported on 5/30/2022 5/14/22   Devora Hubbard MD   saccharomyces boulardii (FLORASTOR) 250 mg capsule Take 1 capsule (250 mg total) by mouth 2 (two) times a day  Patient not taking: Reported on 5/30/2022 3/12/22   Bambi Babinski, MD   Tofacitinib Citrate ER (Xeljanz XR) 11 MG TB24 Take by mouth daily    Historical Provider, MD     I have reviewed home medications with patient personally  Allergies:    Allergies   Allergen Reactions    Mesalamine GI Intolerance     Other reaction(s): Pancreatitis  Annotation - [id number]: pancreatitis    Cefazolin Hives       Social History:     Marital Status: Single   Occupation:    Patient Pre-hospital Living Situation:  Lives with parents  Patient Pre-hospital Level of Mobility:  INDependent  Patient Pre-hospital Diet Restrictions:  Regular  Substance Use History:   Social History     Substance and Sexual Activity   Alcohol Use Yes    Comment: pt states 5 a month/socially     Social History     Tobacco Use   Smoking Status Never Smoker   Smokeless Tobacco Never Used     Social History     Substance and Sexual Activity   Drug Use No       Family History:    non-contributory    Physical Exam:     Vitals:   Blood Pressure: 136/94 (05/30/22 0242)  Pulse: 95 (05/30/22 0242)  Temperature: 98 °F (36 7 °C) (05/30/22 0326)  Respirations: 20 (05/30/22 0242)  Weight - Scale: 82 1 kg (181 lb) (05/30/22 0242)  SpO2: 94 % (05/30/22 0242)    Physical Exam  Vitals and nursing note reviewed  Constitutional:       Appearance: He is well-developed  Comments: Patient appears uncomfortable   HENT:      Head: Normocephalic and atraumatic  Neck:      Thyroid: No thyromegaly  Vascular: No JVD  Trachea: No tracheal deviation  Cardiovascular:      Rate and Rhythm: Normal rate and regular rhythm  Heart sounds: Normal heart sounds  Pulmonary:      Effort: Pulmonary effort is normal  No respiratory distress  Breath sounds: No wheezing or rales  Comments: Satting well on room air  Abdominal:      General: There is distension  Palpations: Abdomen is soft  Tenderness: There is abdominal tenderness  Comments: Area below umbilicus tender   Musculoskeletal:         General: No swelling or deformity  Normal range of motion  Cervical back: Neck supple  Right lower leg: No edema  Left lower leg: No edema  Skin:     General: Skin is warm and dry  Neurological:      General: No focal deficit present  Mental Status: He is alert and oriented to person, place, and time  Psychiatric:         Mood and Affect: Mood normal          Additional Data:     Lab Results: I have personally reviewed pertinent reports        Results from last 7 days   Lab Units 05/30/22 0242   WBC Thousand/uL 11 96*   HEMOGLOBIN g/dL 12 1   HEMATOCRIT % 40 5   PLATELETS Thousands/uL 505*   NEUTROS PCT % 67   LYMPHS PCT % 20   MONOS PCT % 9   EOS PCT % 2     Results from last 7 days   Lab Units 05/30/22  0242   POTASSIUM mmol/L 3 6   CHLORIDE mmol/L 104   CO2 mmol/L 26   BUN mg/dL 15   CREATININE mg/dL 1 15   CALCIUM mg/dL 9 2   ALK PHOS U/L 85   ALT U/L 46   AST U/L 21           Imaging: I have personally reviewed pertinent reports  CT abdomen pelvis wo contrast    Result Date: 5/14/2022  Narrative: CT ABDOMEN AND PELVIS WITHOUT IV CONTRAST INDICATION:   r/o complication prior colectomy / revision     vs UC complication  COMPARISON:  None  TECHNIQUE:  CT examination of the abdomen and pelvis was performed without intravenous contrast  This examination was performed without intravenous contrast in the context of the critical nationwide Omnipaque shortage  Axial, sagittal, and coronal 2D reformatted images were created from the source data and submitted for interpretation  Radiation dose length product (DLP) for this visit:  493 mGy-cm   This examination, like all CT scans performed in the [facility], was performed utilizing techniques to minimize radiation dose exposure, including the use of iterative reconstruction and automated exposure control  Enteric contrast was administered  FINDINGS: ABDOMEN LOWER CHEST:  No clinically significant abnormality identified in the visualized lower chest  LIVER/BILIARY TREE:  Unremarkable  GALLBLADDER:  No calcified gallstones  No pericholecystic inflammatory change  SPLEEN:  Unremarkable  PANCREAS:  Unremarkable  ADRENAL GLANDS:  Unremarkable  KIDNEYS/URETERS:  Unremarkable  No hydronephrosis  STOMACH AND BOWEL:  Unremarkable  APPENDIX:  Status post colectomy without obvious complication  No surrounding fluid collection    ABDOMINOPELVIC CAVITY:  No ascites  No pneumoperitoneum  1 3 cm midline pelvic and 1 4 cm right iliac chain lymph nodes are stable (series 2, image 61 and 65)    VESSELS:  Unremarkable for patient's age  PELVIS REPRODUCTIVE ORGANS:  Unremarkable for patient's age   URINARY BLADDER: Unremarkable  ABDOMINAL WALL/INGUINAL REGIONS:  Unremarkable  OSSEOUS STRUCTURES:  No acute fracture or destructive osseous lesion  Impression: Unremarkable appearance following prior colectomy  No evidence of obstruction or large intra-abdominal collection  Workstation performed: [id number]       EKG, Pathology, and Other Studies Reviewed on Admission:   · EKG: NA    [employer] Records Reviewed: Yes     ** Please Note: Dragon 360 Dictation voice to text software may have been used in the creation of this document   **

## 2022-05-30 NOTE — ASSESSMENT & PLAN NOTE
Patient presents with acute onset localized abdominal pain below umbilical region  Reports constant sharp pain and feeling bloated  · History of ulcerative colitis, status post partial colectomy and ileal pouch formation with stricture of rectum  Normally has 8-10 loose to watery BM per day  Had 1 BM around 430pm yesterday  · CT showed - partial colonic resection with presumed enterocolic anastomosis, residual distal colon is distended to 8 cm with gas and fecal material  Findings suggest rectal obstruction at the level of surgical anastomosis  No free air  · WBC 11 96, patient afebrile  · ED provider discussed case with GI, keep patient NPO, antibiotic, for sigmoidoscopy today  · Patient received IV Zosyn in ED    Will continue  · IV hydration  · Pain control  · NPO  · Consult GI

## 2022-05-30 NOTE — ED NOTES
Officer Manjula Barros took possession of patients police issued gun   Patient aware      Yury Mario, ZACHARY  05/30/22 9441

## 2022-05-30 NOTE — ASSESSMENT & PLAN NOTE
Continue sulfasalazine  Patient was started on sulfasalazine 500mg p o  B i d  For 2 weeks, then 2 tabs in the morning and 1 tab in p m   For 3 weeks, then 2 tabs b i d  on 5/9 by her rheumatologist   Will continue sulfasalazine 2017 05:39

## 2022-05-31 ENCOUNTER — ANESTHESIA EVENT (INPATIENT)
Dept: PERIOP | Facility: HOSPITAL | Age: 27
DRG: 178 | End: 2022-05-31
Payer: COMMERCIAL

## 2022-05-31 ENCOUNTER — ANESTHESIA (INPATIENT)
Dept: PERIOP | Facility: HOSPITAL | Age: 27
DRG: 178 | End: 2022-05-31
Payer: COMMERCIAL

## 2022-05-31 ENCOUNTER — APPOINTMENT (INPATIENT)
Dept: PERIOP | Facility: HOSPITAL | Age: 27
DRG: 178 | End: 2022-05-31
Attending: INTERNAL MEDICINE
Payer: COMMERCIAL

## 2022-05-31 PROCEDURE — 0DBN8ZX EXCISION OF SIGMOID COLON, VIA NATURAL OR ARTIFICIAL OPENING ENDOSCOPIC, DIAGNOSTIC: ICD-10-PCS | Performed by: FAMILY MEDICINE

## 2022-05-31 PROCEDURE — 99232 SBSQ HOSP IP/OBS MODERATE 35: CPT | Performed by: FAMILY MEDICINE

## 2022-05-31 PROCEDURE — 45340 SIG W/TNDSC BALLOON DILATION: CPT | Performed by: INTERNAL MEDICINE

## 2022-05-31 PROCEDURE — 88342 IMHCHEM/IMCYTCHM 1ST ANTB: CPT | Performed by: PATHOLOGY

## 2022-05-31 PROCEDURE — 88305 TISSUE EXAM BY PATHOLOGIST: CPT | Performed by: PATHOLOGY

## 2022-05-31 PROCEDURE — 45331 SIGMOIDOSCOPY AND BIOPSY: CPT | Performed by: INTERNAL MEDICINE

## 2022-05-31 RX ORDER — SODIUM CHLORIDE, SODIUM LACTATE, POTASSIUM CHLORIDE, CALCIUM CHLORIDE 600; 310; 30; 20 MG/100ML; MG/100ML; MG/100ML; MG/100ML
INJECTION, SOLUTION INTRAVENOUS CONTINUOUS PRN
Status: DISCONTINUED | OUTPATIENT
Start: 2022-05-31 | End: 2022-05-31

## 2022-05-31 RX ORDER — LIDOCAINE HYDROCHLORIDE 10 MG/ML
INJECTION, SOLUTION EPIDURAL; INFILTRATION; INTRACAUDAL; PERINEURAL AS NEEDED
Status: DISCONTINUED | OUTPATIENT
Start: 2022-05-31 | End: 2022-05-31

## 2022-05-31 RX ORDER — PROPOFOL 10 MG/ML
INJECTION, EMULSION INTRAVENOUS AS NEEDED
Status: DISCONTINUED | OUTPATIENT
Start: 2022-05-31 | End: 2022-05-31

## 2022-05-31 RX ORDER — PROPOFOL 10 MG/ML
INJECTION, EMULSION INTRAVENOUS CONTINUOUS PRN
Status: DISCONTINUED | OUTPATIENT
Start: 2022-05-31 | End: 2022-05-31

## 2022-05-31 RX ADMIN — HYDROMORPHONE HYDROCHLORIDE 1 MG: 1 INJECTION, SOLUTION INTRAMUSCULAR; INTRAVENOUS; SUBCUTANEOUS at 15:10

## 2022-05-31 RX ADMIN — HYDROMORPHONE HYDROCHLORIDE 1 MG: 1 INJECTION, SOLUTION INTRAMUSCULAR; INTRAVENOUS; SUBCUTANEOUS at 10:37

## 2022-05-31 RX ADMIN — PIPERACILLIN AND TAZOBACTAM 3.38 G: 36; 4.5 INJECTION, POWDER, FOR SOLUTION INTRAVENOUS at 05:08

## 2022-05-31 RX ADMIN — Medication 125 MG: at 08:30

## 2022-05-31 RX ADMIN — FAMOTIDINE 20 MG: 10 INJECTION INTRAVENOUS at 21:45

## 2022-05-31 RX ADMIN — PIPERACILLIN AND TAZOBACTAM 3.38 G: 36; 4.5 INJECTION, POWDER, FOR SOLUTION INTRAVENOUS at 18:04

## 2022-05-31 RX ADMIN — SULFASALAZINE 1000 MG: 500 TABLET ORAL at 18:05

## 2022-05-31 RX ADMIN — SODIUM CHLORIDE, SODIUM LACTATE, POTASSIUM CHLORIDE, AND CALCIUM CHLORIDE: .6; .31; .03; .02 INJECTION, SOLUTION INTRAVENOUS at 13:42

## 2022-05-31 RX ADMIN — FAMOTIDINE 20 MG: 10 INJECTION INTRAVENOUS at 08:30

## 2022-05-31 RX ADMIN — HYDROMORPHONE HYDROCHLORIDE 1 MG: 1 INJECTION, SOLUTION INTRAMUSCULAR; INTRAVENOUS; SUBCUTANEOUS at 01:47

## 2022-05-31 RX ADMIN — PROPOFOL 120 MG: 10 INJECTION, EMULSION INTRAVENOUS at 13:55

## 2022-05-31 RX ADMIN — ONDANSETRON 4 MG: 2 INJECTION INTRAMUSCULAR; INTRAVENOUS at 20:23

## 2022-05-31 RX ADMIN — PROPOFOL 40 MG: 10 INJECTION, EMULSION INTRAVENOUS at 13:57

## 2022-05-31 RX ADMIN — LIDOCAINE HYDROCHLORIDE 50 MG: 10 INJECTION, SOLUTION EPIDURAL; INFILTRATION; INTRACAUDAL; PERINEURAL at 13:55

## 2022-05-31 RX ADMIN — HYDROMORPHONE HYDROCHLORIDE 1 MG: 1 INJECTION, SOLUTION INTRAMUSCULAR; INTRAVENOUS; SUBCUTANEOUS at 06:07

## 2022-05-31 RX ADMIN — DULOXETINE HYDROCHLORIDE 60 MG: 30 CAPSULE, DELAYED RELEASE ORAL at 08:30

## 2022-05-31 RX ADMIN — SODIUM CHLORIDE, SODIUM LACTATE, POTASSIUM CHLORIDE, AND CALCIUM CHLORIDE 100 ML/HR: .6; .31; .03; .02 INJECTION, SOLUTION INTRAVENOUS at 05:13

## 2022-05-31 RX ADMIN — Medication 125 MG: at 21:47

## 2022-05-31 RX ADMIN — PIPERACILLIN AND TAZOBACTAM 3.38 G: 36; 4.5 INJECTION, POWDER, FOR SOLUTION INTRAVENOUS at 10:37

## 2022-05-31 RX ADMIN — HYDROMORPHONE HYDROCHLORIDE 1 MG: 1 INJECTION, SOLUTION INTRAMUSCULAR; INTRAVENOUS; SUBCUTANEOUS at 20:23

## 2022-05-31 RX ADMIN — SULFASALAZINE 1000 MG: 500 TABLET ORAL at 08:30

## 2022-05-31 NOTE — ANESTHESIA PREPROCEDURE EVALUATION
Procedure:  FLEXIBLE SIGMOIDOSCOPY    Relevant Problems   HEMATOLOGY   (+) Anemia      MUSCULOSKELETAL   (+) Ankylosing spondylitis (HCC)   (+) Left-sided low back pain without sciatica      NEURO/PSYCH   (+) CAR (generalized anxiety disorder)   (+) History of Clostridium difficile infection   (+) History of ulcerative colitis      Digestive   (+) 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HCC)   (+) Stricture of rectum      Other   (+) COVID-19 virus infection   (+) Seizure-like activity (HCC)        Physical Exam    Airway    Mallampati score: II  TM Distance: >3 FB  Neck ROM: full     Dental       Cardiovascular  Rhythm: regular, Rate: normal,     Pulmonary  Breath sounds clear to auscultation,     Other Findings        Anesthesia Plan  ASA Score- 2     Anesthesia Type- IV sedation with anesthesia with ASA Monitors  Additional Monitors:   Airway Plan:           Plan Factors-    Chart reviewed  Patient is not a current smoker  Induction- intravenous  Postoperative Plan-     Informed Consent- Anesthetic plan and risks discussed with patient  I personally reviewed this patient with the CRNA  Discussed and agreed on the Anesthesia Plan with the CRNA  Dwight Collier

## 2022-05-31 NOTE — UTILIZATION REVIEW
Initial Clinical Review    Admission: Date/Time/Statement:   Admission Orders (From admission, onward)     Ordered        05/30/22 0446  INPATIENT ADMISSION  Once                      Orders Placed This Encounter   Procedures    INPATIENT ADMISSION     Standing Status:   Standing     Number of Occurrences:   1     Order Specific Question:   Level of Care     Answer:   Med Surg [16]     Order Specific Question:   Estimated length of stay     Answer:   More than 2 Midnights     Order Specific Question:   Certification     Answer:   I certify that inpatient services are medically necessary for this patient for a duration of greater than two midnights  See H&P and MD Progress Notes for additional information about the patient's course of treatment  ED Arrival Information     Expected   -    Arrival   5/30/2022 02:21    Acuity   Urgent            Means of arrival   Walk-In    Escorted by   Self    Service   Hospitalist    Admission type   Urgent            Arrival complaint   abd pain           Chief Complaint   Patient presents with    Abdominal Pain     Patient c/o severe abdominal pain started a few hours ago, patient unable to sit comfortably, last bowel movement was 16:30     Initial Presentation:   32 yom to ER from home c/o severe abd pain & nausea  Hx ulcerative colitis, status post total colectomy and J pouch ileoanal anastomosis in 2015 with subsequent chronic stricture  Patient does have a pouch in his rectum she states around 1630 he started with abdominal pain and distension  Presents as above, abd tender to palpation, not passing gas  Admission work-up showing partial SBO on imaging, COVIDF+, leukocytosis  Admitted to inpatient status for rectal obstruction  Started on IVABT, IVF, IV PPI, GI consulted  Per GI: rectal obstruction  Tx empirically for pouchitis, plan pouchoscopy with dilatation tomorrow  Date: 5/31/22   Day 2:   Rectal obstruction POA  Using IV Dilaudid for pain control   IVABT OFFICE VISIT      Patient: Kit Mejía Date of Service: 10/24/2019   : 1946 MRN: [id number]     SUBJECTIVE:   HISTORY OF PRESENT ILLNESS:  Kit Mejía is a 73 year old male who presents today for F/U  SEEN  CV  ECHO DONE  CLEAR FOR GI WORK UP  SEEN NEUROSURG  NEED IMAGE  IMPROVED  READY TO RTW      Chief Complaint   Patient presents with   • Follow-up       HPI    PAST MEDICAL HISTORY:  Past Medical History:   Diagnosis Date   • Cervical spondylosis 2018   • Chronic gout 2/3/2011   • Coronary artery disease 2/3/2011   • Essential hypertension 2/3/2011   • Other hyperlipidemia 2/3/2011   • Primary osteoarthritis of both knees 2/3/2011   • Spinal stenosis of lumbar region with neurogenic claudication 2019   • Status post primary angioplasty with coronary stent 2/3/2011       MEDICATIONS:  Current Outpatient Medications   Medication Sig   • bisacodyl (DULCOLAX) 5 MG EC tablet Take as directed per colonoscopy prep instructions   • metoCLOPramide (REGLAN) 10 MG tablet Take 1 tablet by mouth 4 times daily.   • tamsulosin (FLOMAX) 0.4 MG Cap Take 0.4 mg by mouth daily after a meal.   • omeprazole (PRILOSEC) 40 MG capsule Take 1 capsule by mouth daily. TAKE 1 CAPSULE DAILY.   • metoPROLOL succinate (TOPROL-XL) 25 MG 24 hr tablet Take 1 tablet by mouth daily. TAKE 1 TABLET BY MOUTH ONCE DAILY.   • clopidogrel (PLAVIX) 75 MG tablet Take 1 tablet by mouth daily. take one tablet by mouth every day.   • atorvastatin (LIPITOR) 40 MG tablet Take 1 tablet by mouth daily. TAKE 1 TABLET DAILY AT BEDTIME.   • allopurinol (ZYLOPRIM) 100 MG tablet Take 1 tablet by mouth 2 times daily.   • predniSONE (DELTASONE) 5 MG tablet TAKE ONE TABLET BY MOUTH TWICE DAILY  (Patient taking differently: TAKE ONE TABLET BY MOUTH TWICE DAILY PRN with Allopurinol)   • sildenafil (VIAGRA) 100 MG tablet TAKE ONE TABLET BY MOUTH EVERY DAY 1 HOUR BEFORE NEEDED.     No current facility-administered medications for this visit.         ALLERGIES:  ALLERGIES:  No Known Allergies    PAST SURGICAL HISTORY:  Past Surgical History:   Procedure Laterality Date   • Appendectomy     • Carpal tunnel release     • Colonoscopy  2013    TW - Torres - polyps    • Disc removal     • Rotator cuff repair         FAMILY HISTORY:  Family History   Problem Relation Age of Onset   • Coronary Artery Disease Mother    • Myocardial Infarction Mother    • Aneurysm Father        SOCIAL HISTORY:  Social History     Tobacco Use   • Smoking status: Former Smoker     Packs/day: 1.50     Types: Cigarettes     Start date:      Last attempt to quit:      Years since quittin.8   • Smokeless tobacco: Never Used   Substance Use Topics   • Alcohol use: No     Frequency: Never   • Drug use: Never       Review of Systems   Respiratory: Negative for wheezing.    Cardiovascular: Negative for chest pain.   Neurological: Negative for focal weakness.       OBJECTIVE:     Physical Exam   Constitutional: He is oriented to person, place, and time and well-developed, well-nourished, and in no distress.   HENT:   Head: Normocephalic and atraumatic.   Right Ear: External ear normal.   Left Ear: External ear normal.   Nose: Nose normal.   Mouth/Throat: Oropharynx is clear and moist.   Eyes: Pupils are equal, round, and reactive to light. Conjunctivae and EOM are normal.   Neck: Normal range of motion. Neck supple.   Cardiovascular: Normal rate, regular rhythm and intact distal pulses.   Murmur heard.  Pulmonary/Chest: Effort normal and breath sounds normal. He has no wheezes.   Abdominal: Soft. Bowel sounds are normal. Musculoskeletal: Normal range of motion.     Neurological: He is alert and oriented to person, place, and time. Gait normal.   Skin: Skin is warm and dry.   Psychiatric: Mood, affect and judgment normal.   Nursing note and vitals reviewed.      Visit Vitals  /57   Pulse 92   Ht 6' (1.829 m)   Wt 91 kg (200 lb 11.7 oz)   BMI 27.22 kg/m²       DIAGNOSTIC  continued for suspected pouchitis  Scheduled for flex sig today  Flex sigmoidoscopy=  FINDINGS:  · Mild, patchy erythematous mucosa; performed cold forceps biopsy  Ileoanal anastomosis was seen, small bowel had some erythema punctate ulcers, biopsied  Ileoanal anastomosis was is open but about 16 to18 mm in caliber  This was dilated with 20 mm balloon for 30 seconds    IMPRESSION:  Ulcerated area distal small bowel with yellowish exudate, biopsied  Ileoanal stricture was dilated to 20 mm with balloon    ED Triage Vitals   Temperature Pulse Respirations Blood Pressure SpO2   05/30/22 0326 05/30/22 0242 05/30/22 0242 05/30/22 0242 05/30/22 0242   98 °F (36 7 °C) 95 20 136/94 94 %      Temp Source Heart Rate Source Patient Position - Orthostatic VS BP Location FiO2 (%)   05/30/22 1000 05/30/22 1000 05/30/22 0242 05/30/22 0242 --   Oral Monitor Lying Right arm       Pain Score       05/30/22 0240       10 - Worst Possible Pain          Wt Readings from Last 1 Encounters:   05/30/22 82 1 kg (181 lb)     Additional Vital Signs:   Date/Time Temp Pulse Resp BP MAP (mmHg) SpO2 O2 Device Patient Position - Orthostatic VS   05/31/22 0749 98 4 °F (36 9 °C) 75 18 139/84 104 98 % None (Room air) Lying   05/30/22 2016 98 1 °F (36 7 °C) 83 18 143/84 -- 97 % None (Room air) Lying   05/30/22 1522 98 2 °F (36 8 °C) 72 16 128/72 93 96 % -- --   05/30/22 1000 97 8 °F (36 6 °C) 70 18 133/86 -- 97 % None (Room air) Lying   05/30/22 0829 -- 87 14 142/93 111 95 % None (Room air) --     Pertinent Labs/Diagnostic Test Results:   CT abdomen pelvis wo contrast   Final Result by Deanna Segura DO (05/30 1303)   Findings compatible with at least partial small bowel obstruction at the level of the ileoanal anastomosis causing dilatation of distal small bowel up to 8 cm and retention of large volumes of gas and stool        Circumferential Inflammation involving distal jejunum/proximal ileum in the right lower abdomen contributing to a 2nd STUDIES:   LAB RESULTS:  Lab Services on 09/30/2019   Component Date Value Ref Range Status   • B12 09/30/2019 758  211 - 911 pg/mL Final   • IRON 09/30/2019 41* 65 - 175 mcg/dL Final   • Total Iron Binding Capacity 09/30/2019 205* 250 - 450 mcg/dL Final   • PERCENT IRON SATURATION 09/30/2019 20  15 - 45 % Final   • FOLATE 09/30/2019 6.2  >5.4 ng/mL Final   • Ferritin 09/30/2019 364  26 - 388 ng/mL Final   • Fasting Status 09/30/2019 0  hrs Final   • Sodium 09/30/2019 143  135 - 145 mmol/L Final   • Potassium 09/30/2019 4.9  3.4 - 5.1 mmol/L Final   • Chloride 09/30/2019 108* 98 - 107 mmol/L Final   • Carbon Dioxide 09/30/2019 30  21 - 32 mmol/L Final   • Anion Gap 09/30/2019 10  10 - 20 mmol/L Final   • Glucose 09/30/2019 80  65 - 99 mg/dL Final   • BUN 09/30/2019 15  6 - 20 mg/dL Final   • Creatinine 09/30/2019 1.29* 0.67 - 1.17 mg/dL Final   • GFR Estimate,  09/30/2019 62   Final   • GFR Estimate, Non  09/30/2019 54   Final   • BUN/Creatinine Ratio 09/30/2019 12  7 - 25 Final   • CALCIUM 09/30/2019 9.0  8.4 - 10.2 mg/dL Final       Assessment AND PLAN:   This is a 73 year old year-old male who presents with       Scott was seen today for follow-up.    Diagnoses and all orders for this visit:    Spinal stenosis of lumbar region with neurogenic claudication    Systolic murmur    Cervical spondylosis    Essential hypertension    Other hyperlipidemia      PROCEED WITH GI  WORK UP  CT  XRAY ORDERED  BY  NEUROSURG  RTW NOV 11  FORM DONE    Please take medications as directed.      Instructions provided as documented in the AVS.    Return in about 1 month (around 11/24/2019).      The patient indicated understanding of the diagnosis and agreed with the plan of care.         transition point is also partially obstructing approximately 4 cm beyond the small bowel-small bowel anastomotic line in the right mid    to upper abdomen  Stable pelvic adenopathy          Results from last 7 days   Lab Units 05/30/22  0412   SARS-COV-2  Positive*     Results from last 7 days   Lab Units 05/30/22  0242   WBC Thousand/uL 11 96*   HEMOGLOBIN g/dL 12 1   HEMATOCRIT % 40 5   PLATELETS Thousands/uL 505*   NEUTROS ABS Thousands/µL 7 98*     Results from last 7 days   Lab Units 05/30/22  0242   SODIUM mmol/L 139   POTASSIUM mmol/L 3 6   CHLORIDE mmol/L 104   CO2 mmol/L 26   ANION GAP mmol/L 9   BUN mg/dL 15   CREATININE mg/dL 1 15   EGFR ml/min/1 73sq m 85   CALCIUM mg/dL 9 2     Results from last 7 days   Lab Units 05/30/22  0242   AST U/L 21   ALT U/L 46   ALK PHOS U/L 85   TOTAL PROTEIN g/dL 7 7   ALBUMIN g/dL 4 0   TOTAL BILIRUBIN mg/dL 0 47     Results from last 7 days   Lab Units 05/30/22  0242   GLUCOSE RANDOM mg/dL 88     Results from last 7 days   Lab Units 05/30/22  0242   LIPASE u/L 177     Results from last 7 days   Lab Units 05/30/22  0412   INFLUENZA A PCR  Negative   INFLUENZA B PCR  Negative   RSV PCR  Negative     ED Treatment:   Medication Administration from 05/30/2022 0221 to 05/30/2022 0910       Date/Time Order Dose Route Action     05/30/2022 0239 sodium chloride 0 9 % bolus 1,000 mL 1,000 mL Intravenous New Bag     05/30/2022 0240 HYDROmorphone (DILAUDID) injection 1 mg 1 mg Intravenous Given     05/30/2022 0241 metoclopramide (REGLAN) injection 10 mg 10 mg Intravenous Given     05/30/2022 0259 fentanyl citrate (PF) 100 MCG/2ML 100 mcg 100 mcg Intravenous Given     05/30/2022 0409 HYDROmorphone (DILAUDID) injection 1 mg 1 mg Intravenous Given     05/30/2022 0517 piperacillin-tazobactam (ZOSYN) IVPB 3 375 g 3 375 g Intravenous New Bag     05/30/2022 0617 HYDROmorphone (DILAUDID) injection 1 mg 1 mg Intravenous Given     05/30/2022 0642 fentanyl citrate (PF) 100 MCG/2ML 100 mcg 100 mcg Intravenous Given     05/30/2022 0830 lactated ringers infusion 100 mL/hr Intravenous New Bag     05/30/2022 0903 HYDROmorphone (DILAUDID) injection 1 mg 1 mg Intravenous Given        Past Medical History:   Diagnosis Date    Ankylosing spondylitis (HCC)     Anxiety     Bowel obstruction (HCC)     Clostridium difficile colitis 9/13/2018    Colitis     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[facility] [address] ) 9/13/2018    Pancreatitis     Ulcerative colitis ([facility] [address] )      Present on Admission:   Ankylosing spondylitis ([facility] [address] )   CAR (generalized anxiety disorder)    Admitting Diagnosis: Abdominal pain [R10 9]  Rectal obstruction [K62 4]  Age/Sex: 32 y o  male  Admission Orders:  scd  Consult GI  Contact isolation  Loni HART    Scheduled Medications:  DULoxetine, 60 mg, Oral, Daily  famotidine, 20 mg, Intravenous, Q12H KAITLIN  piperacillin-tazobactam, 3 375 g, Intravenous, Q6H  sulfaSALAzine, 1,000 mg, Oral, BID  vancomycin, 125 mg, Oral, Q12H [facility]    Continuous IV Infusions:  lactated ringers, 100 mL/hr, Intravenous, Continuous    PRN Meds:  HYDROmorphone, 0 5 mg, Intravenous, Q4H PRN   Or  HYDROmorphone, 1 mg, Intravenous, Q4H PRN, 5/30 x3, 5/31 x4  ondansetron, 4 mg, Intravenous, Q6H PRN    Network Utilization Review Department  ATTENTION: Please call with any questions or concerns to [phone] and carefully listen to the prompts so that you are directed to the right person  All voicemails are confidential   Timothy Contreras all requests for admission clinical reviews, approved or denied determinations and any other requests to dedicated fax number below belonging to the campus where the patient is receiving treatment   List of dedicated fax numbers for the Facilities:  [address] DENIALS (Administrative/Medical Necessity) [phone]   [address] (Maternity/NICU/Pediatrics) [phone]   [address] [place] [address] 95 6550 [facility] Abdirizak Enriquez [phone]   [address] [address] [place] [address] [facility] [address] [address] [place] [address] [phone]

## 2022-05-31 NOTE — CASE MANAGEMENT
Case Management Assessment & Discharge Planning Note    Patient name Magen Balbuena  [facility] MRN [id number]  : 1995 Date 2022       Current Admission Date: 2022  Current Admission Diagnosis:Rectal obstruction   Patient Active Problem List    Diagnosis Date Noted    Rectal obstruction 2022    COVID-19 virus infection 2022    History of Clostridium difficile infection 2022    Dyspepsia 2022    Abnormal CT scan of lung 2022    Presumed AV endocarditis 2022    Pouchitis ([facility] [address] ) 2022    Anemia 2022    Polymicrobial after streptococcal bacteremia 2022    Enteritis 2022    SIRS (systemic inflammatory response syndrome) ([facility] [address] ) 2022    Sepsis ([facility] [address] ) 2022    Ankylosing spondylitis ([facility] [address] ) 2022    Elevated LFTs 2022    Arm and leg movements, uncontrollable 2021    Seizure-like activity ([facility] [address] ) 2021    Arthralgia 09/15/2020    Sacroiliac joint dysfunction of right side 2020    Left groin pain 2019    Left-sided low back pain without sciatica     Complications of intestinal pouch ([facility] [address] ) 2019    History of ulcerative colitis 2019    Hyponatremia 2019    CAR (generalized anxiety disorder) 10/19/2018    BMI 28 0-28 9,adult 10/19/2018    Chronic ulcerative pancolitis ([address] ) 2018    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[facility] [address] ) 2018    Stricture of rectum 2018      LOS (days): 1  Geometric Mean LOS (GMLOS) (days):   Days to GMLOS:     OBJECTIVE:    Risk of Unplanned Readmission Score: 19 03      Current admission status: Inpatient     Preferred Pharmacy:   [address] [facility] [address] [place] [address]  Phone: [phone] Fax: [phone]    Primary Care Provider: Rossana Reynoso DO    Primary Insurance: BLUE CROSS  Secondary Insurance:     ASSESSMENT:  Active Health Care Proxies     [place], [place] Representative - Father   Primary Phone: [phone] [facility]  Home Phone: [phone]            Readmission Root Cause  30 Day Readmission: No    Patient Information  Admitted from[de-identified] Home  Mental Status: Alert  During Assessment patient was accompanied by: Not accompanied during assessment  Assessment information provided by[de-identified] Patient  Primary Caregiver: Self  Support Systems: Family members  [place] of Residence: [address] [place] [address] do you live in?: [address] 45 entry access options   Select all that apply : Stairs  Number of steps to enter home : 2  Type of Current Residence: [address] home  In the last 12 months, was there a time when you were not able to pay the mortgage or rent on time?: No  In the last 12 months, how many places have you lived?: 1  In the last 12 months, was there a time when you did not have a steady place to sleep or slept in a shelter (including now)?: No  Homeless/housing insecurity resource given?: N/A  Living Arrangements: Lives w/ Parent(s)  Is patient a ?: No    Activities of Daily Living Prior to Admission  Functional Status: Independent  Completes ADLs independently?: Yes  Ambulates independently?: Yes  Does patient use assisted devices?: No  Does patient currently own DME?: No  Does patient have a history of Outpatient Therapy (PT/OT)?: No  Does the patient have a history of Short-Term Rehab?: No  Does patient have a history of HHC?: Yes (Hx w/ [facility] for home infusion)  Does patient currently have [facility]?: No     Patient Information Continued  Income Source: Employed ()  Does patient have prescription coverage?: Yes  Within the past 12 months, you worried that your food would run out before you got the money to buy more : Never true  Within the past 12 months, the food you bought just didn't last and you didn't have money to get more : Never true  Food insecurity resource given?: N/A  Does patient receive dialysis treatments?: No  Does patient have a history of substance abuse?: No  Does patient have a history of Mental Health Diagnosis?: No     Means of Transportation  Means of Transport to Appts[de-identified] Drives Self  In the past 12 months, has lack of transportation kept you from medical appointments or from getting medications?: No  In the past 12 months, has lack of transportation kept you from meetings, work, or from getting things needed for daily living?: No  Was application for public transport provided?: N/A    DISCHARGE DETAILS:    Discharge planning discussed with[de-identified] Patient     CM contacted family/caregiver?: Yes  Were Treatment Team discharge recommendations reviewed with patient/caregiver?: Yes  Did patient/caregiver verbalize understanding of patient care needs?: Yes  Were patient/caregiver advised of the risks associated with not following Treatment Team discharge recommendations?: Yes    Contacts  Patient Contacts: Rommel Torres  Relationship to Patient[de-identified] Family  Contact Method: Phone  Phone Number: [phone]  Reason/Outcome: Emergency Contact    Would you like to participate in our [address] service program?  : No - Declined    Treatment Team Recommendation: Home  Discharge Destination Plan[de-identified] Home  Transport at Discharge : Family

## 2022-05-31 NOTE — ANESTHESIA POSTPROCEDURE EVALUATION
Post-Op Assessment Note    CV Status:  Stable  Pain Score: 0    Pain management: adequate     Mental Status:  Arousable and sleepy   Hydration Status:  Stable   PONV Controlled:  None   Airway Patency:  Patent      Post Op Vitals Reviewed: Yes      Staff: CRNA   Comments: spontaneously breathing, protecting airway, vss, fully endorsed to recovery w/o AC        No complications documented      BP   98/58   Temp      Pulse  70   Resp   15   SpO2   99

## 2022-05-31 NOTE — PLAN OF CARE
Problem: Nutrition/Hydration-ADULT  Goal: Nutrient/Hydration intake appropriate for improving, restoring or maintaining nutritional needs  Description: Monitor and assess patient's nutrition/hydration status for malnutrition  Collaborate with interdisciplinary team and initiate plan and interventions as ordered  Monitor patient's weight and dietary intake as ordered or per policy  Utilize nutrition screening tool and intervene as necessary  Determine patient's food preferences and provide high-protein, high-caloric foods as appropriate       INTERVENTIONS:  - Monitor oral intake, urinary output, labs, and treatment plans  - Assess nutrition and hydration status and recommend course of action  - Evaluate amount of meals eaten  - Assist patient with eating if necessary   - Allow adequate time for meals  - Recommend/ encourage appropriate diets, oral nutritional supplements, and vitamin/mineral supplements  - Order, calculate, and assess calorie counts as needed  - Recommend, monitor, and adjust tube feedings and TPN/PPN based on assessed needs  - Assess need for intravenous fluids  - Provide specific nutrition/hydration education as appropriate  - Include patient/family/caregiver in decisions related to nutrition  Outcome: Progressing     Problem: PAIN - ADULT  Goal: Verbalizes/displays adequate comfort level or baseline comfort level  Description: Interventions:  - Encourage patient to monitor pain and request assistance  - Assess pain using appropriate pain scale  - Administer analgesics based on type and severity of pain and evaluate response  - Implement non-pharmacological measures as appropriate and evaluate response  - Consider cultural and social influences on pain and pain management  - Notify physician/advanced practitioner if interventions unsuccessful or patient reports new pain  Outcome: Progressing     Problem: INFECTION - ADULT  Goal: Absence or prevention of progression during hospitalization  Description: INTERVENTIONS:  - Assess and monitor for signs and symptoms of infection  - Monitor lab/diagnostic results  - Monitor all insertion sites, i e  indwelling lines, tubes, and drains  - Monitor endotracheal if appropriate and nasal secretions for changes in amount and color  - [place] appropriate cooling/warming therapies per order  - Administer medications as ordered  - Instruct and encourage patient and family to use good hand hygiene technique  - Identify and instruct in appropriate isolation precautions for identified infection/condition  Outcome: Progressing     Problem: RESPIRATORY - ADULT  Goal: Achieves optimal ventilation and oxygenation  Description: INTERVENTIONS:  - Assess for changes in respiratory status  - Assess for changes in mentation and behavior  - Position to facilitate oxygenation and minimize respiratory effort  - Oxygen administered by appropriate delivery if ordered  - Initiate smoking cessation education as indicated  - Encourage broncho-pulmonary hygiene including cough, deep breathe, Incentive Spirometry  - Assess the need for suctioning and aspirate as needed  - Assess and instruct to report SOB or any respiratory difficulty  - Respiratory Therapy support as indicated  Outcome: Progressing     Problem: GASTROINTESTINAL - ADULT  Goal: Minimal or absence of nausea and/or vomiting  Description: INTERVENTIONS:  - Administer IV fluids if ordered to ensure adequate hydration  - Maintain NPO status until nausea and vomiting are resolved  - Nasogastric tube if ordered  - Administer ordered antiemetic medications as needed  - Provide nonpharmacologic comfort measures as appropriate  - Advance diet as tolerated, if ordered  - Consider nutrition services referral to assist patient with adequate nutrition and appropriate food choices  Outcome: Progressing  Goal: Maintains or returns to baseline bowel function  Description: INTERVENTIONS:  - Assess bowel function  - Encourage oral fluids to ensure adequate hydration  - Administer IV fluids if ordered to ensure adequate hydration  - Administer ordered medications as needed  - Encourage mobilization and activity  - Consider nutritional services referral to assist patient with adequate nutrition and appropriate food choices  Outcome: Progressing  Goal: Maintains adequate nutritional intake  Description: INTERVENTIONS:  - Monitor percentage of each meal consumed  - Identify factors contributing to decreased intake, treat as appropriate  - Assist with meals as needed  - Monitor I&O, weight, and lab values if indicated  - Obtain nutrition services referral as needed  Outcome: Progressing

## 2022-06-01 ENCOUNTER — HOSPITAL ENCOUNTER (EMERGENCY)
Facility: HOSPITAL | Age: 27
Discharge: HOME | End: 2022-06-02
Attending: EMERGENCY MEDICINE
Payer: COMMERCIAL

## 2022-06-01 VITALS
SYSTOLIC BLOOD PRESSURE: 119 MMHG | HEIGHT: 69 IN | WEIGHT: 181 LBS | TEMPERATURE: 98.2 F | HEART RATE: 66 BPM | RESPIRATION RATE: 18 BRPM | DIASTOLIC BLOOD PRESSURE: 89 MMHG | OXYGEN SATURATION: 100 % | BODY MASS INDEX: 26.81 KG/M2

## 2022-06-01 DIAGNOSIS — R10.9 ABDOMINAL PAIN: Primary | ICD-10-CM

## 2022-06-01 LAB
ANION GAP SERPL CALCULATED.3IONS-SCNC: 6 MMOL/L (ref 4–13)
BUN SERPL-MCNC: 8 MG/DL (ref 5–25)
CALCIUM SERPL-MCNC: 8.5 MG/DL (ref 8.3–10.1)
CHLORIDE SERPL-SCNC: 103 MMOL/L (ref 100–108)
CO2 SERPL-SCNC: 29 MMOL/L (ref 21–32)
CREAT SERPL-MCNC: 1.05 MG/DL (ref 0.6–1.3)
ERYTHROCYTE [DISTWIDTH] IN BLOOD BY AUTOMATED COUNT: 16.6 % (ref 11.6–15.1)
GFR SERPL CREATININE-BSD FRML MDRD: 95 ML/MIN/1.73SQ M
GLUCOSE SERPL-MCNC: 67 MG/DL (ref 65–140)
HCT VFR BLD AUTO: 33.9 % (ref 36.5–49.3)
HGB BLD-MCNC: 10 G/DL (ref 12–17)
MCH RBC QN AUTO: 19 PG (ref 26.8–34.3)
MCHC RBC AUTO-ENTMCNC: 29.5 G/DL (ref 31.4–37.4)
MCV RBC AUTO: 65 FL (ref 82–98)
PLATELET # BLD AUTO: 392 THOUSANDS/UL (ref 149–390)
PMV BLD AUTO: 9.1 FL (ref 8.9–12.7)
POTASSIUM SERPL-SCNC: 4 MMOL/L (ref 3.5–5.3)
RBC # BLD AUTO: 5.25 MILLION/UL (ref 3.88–5.62)
SODIUM SERPL-SCNC: 138 MMOL/L (ref 136–145)
WBC # BLD AUTO: 9.2 THOUSAND/UL (ref 4.31–10.16)

## 2022-06-01 PROCEDURE — 45331 SIGMOIDOSCOPY AND BIOPSY: CPT | Performed by: PHYSICIAN ASSISTANT

## 2022-06-01 PROCEDURE — 45340 SIG W/TNDSC BALLOON DILATION: CPT | Performed by: PHYSICIAN ASSISTANT

## 2022-06-01 PROCEDURE — 85027 COMPLETE CBC AUTOMATED: CPT | Performed by: FAMILY MEDICINE

## 2022-06-01 PROCEDURE — 99239 HOSP IP/OBS DSCHRG MGMT >30: CPT | Performed by: FAMILY MEDICINE

## 2022-06-01 PROCEDURE — 80048 BASIC METABOLIC PNL TOTAL CA: CPT | Performed by: FAMILY MEDICINE

## 2022-06-01 PROCEDURE — 99285 EMERGENCY DEPT VISIT HI MDM: CPT

## 2022-06-01 RX ORDER — LEVOFLOXACIN 500 MG/1
500 TABLET, FILM COATED ORAL DAILY
Qty: 5 TABLET | Refills: 0 | Status: SHIPPED | OUTPATIENT
Start: 2022-06-01 | End: 2022-06-07

## 2022-06-01 RX ORDER — VANCOMYCIN HYDROCHLORIDE 125 MG/1
125 CAPSULE ORAL EVERY 12 HOURS
Qty: 15 CAPSULE | Refills: 0 | Status: ON HOLD | OUTPATIENT
Start: 2022-06-01 | End: 2022-06-07 | Stop reason: SDUPTHER

## 2022-06-01 RX ORDER — METRONIDAZOLE 500 MG/1
500 TABLET ORAL EVERY 8 HOURS SCHEDULED
Qty: 15 TABLET | Refills: 0 | Status: ON HOLD | OUTPATIENT
Start: 2022-06-01 | End: 2022-06-07

## 2022-06-01 RX ORDER — SULFASALAZINE 500 MG/1
1000 TABLET ORAL 2 TIMES DAILY
Refills: 0
Start: 2022-06-01

## 2022-06-01 RX ADMIN — ONDANSETRON 4 MG: 2 INJECTION INTRAMUSCULAR; INTRAVENOUS at 09:34

## 2022-06-01 RX ADMIN — HYDROMORPHONE HYDROCHLORIDE 1 MG: 1 INJECTION, SOLUTION INTRAMUSCULAR; INTRAVENOUS; SUBCUTANEOUS at 04:54

## 2022-06-01 RX ADMIN — PIPERACILLIN AND TAZOBACTAM 3.38 G: 36; 4.5 INJECTION, POWDER, FOR SOLUTION INTRAVENOUS at 12:25

## 2022-06-01 RX ADMIN — DULOXETINE HYDROCHLORIDE 60 MG: 30 CAPSULE, DELAYED RELEASE ORAL at 08:10

## 2022-06-01 RX ADMIN — FAMOTIDINE 20 MG: 10 INJECTION INTRAVENOUS at 08:10

## 2022-06-01 RX ADMIN — SULFASALAZINE 1000 MG: 500 TABLET ORAL at 08:09

## 2022-06-01 RX ADMIN — PIPERACILLIN AND TAZOBACTAM 3.38 G: 36; 4.5 INJECTION, POWDER, FOR SOLUTION INTRAVENOUS at 04:35

## 2022-06-01 RX ADMIN — Medication 125 MG: at 09:35

## 2022-06-01 RX ADMIN — PIPERACILLIN AND TAZOBACTAM 3.38 G: 36; 4.5 INJECTION, POWDER, FOR SOLUTION INTRAVENOUS at 00:08

## 2022-06-01 RX ADMIN — HYDROMORPHONE HYDROCHLORIDE 1 MG: 1 INJECTION, SOLUTION INTRAMUSCULAR; INTRAVENOUS; SUBCUTANEOUS at 09:34

## 2022-06-01 RX ADMIN — HYDROMORPHONE HYDROCHLORIDE 1 MG: 1 INJECTION, SOLUTION INTRAMUSCULAR; INTRAVENOUS; SUBCUTANEOUS at 15:12

## 2022-06-01 RX ADMIN — HYDROMORPHONE HYDROCHLORIDE 1 MG: 1 INJECTION, SOLUTION INTRAMUSCULAR; INTRAVENOUS; SUBCUTANEOUS at 00:40

## 2022-06-01 RX ADMIN — ONDANSETRON 4 MG: 2 INJECTION INTRAMUSCULAR; INTRAVENOUS at 04:31

## 2022-06-01 NOTE — PLAN OF CARE
Problem: Nutrition/Hydration-ADULT  Goal: Nutrient/Hydration intake appropriate for improving, restoring or maintaining nutritional needs  Description: Monitor and assess patient's nutrition/hydration status for malnutrition  Collaborate with interdisciplinary team and initiate plan and interventions as ordered  Monitor patient's weight and dietary intake as ordered or per policy  Utilize nutrition screening tool and intervene as necessary  Determine patient's food preferences and provide high-protein, high-caloric foods as appropriate       INTERVENTIONS:  - Monitor oral intake, urinary output, labs, and treatment plans  - Assess nutrition and hydration status and recommend course of action  - Evaluate amount of meals eaten  - Assist patient with eating if necessary   - Allow adequate time for meals  - Recommend/ encourage appropriate diets, oral nutritional supplements, and vitamin/mineral supplements  - Order, calculate, and assess calorie counts as needed  - Recommend, monitor, and adjust tube feedings and TPN/PPN based on assessed needs  - Assess need for intravenous fluids  - Provide specific nutrition/hydration education as appropriate  - Include patient/family/caregiver in decisions related to nutrition  Outcome: Progressing     Problem: PAIN - ADULT  Goal: Verbalizes/displays adequate comfort level or baseline comfort level  Description: Interventions:  - Encourage patient to monitor pain and request assistance  - Assess pain using appropriate pain scale  - Administer analgesics based on type and severity of pain and evaluate response  - Implement non-pharmacological measures as appropriate and evaluate response  - Consider cultural and social influences on pain and pain management  - Notify physician/advanced practitioner if interventions unsuccessful or patient reports new pain  Outcome: Progressing     Problem: INFECTION - ADULT  Goal: Absence or prevention of progression during hospitalization  Description: INTERVENTIONS:  - Assess and monitor for signs and symptoms of infection  - Monitor lab/diagnostic results  - Monitor all insertion sites, i e  indwelling lines, tubes, and drains  - Monitor endotracheal if appropriate and nasal secretions for changes in amount and color  - [place] appropriate cooling/warming therapies per order  - Administer medications as ordered  - Instruct and encourage patient and family to use good hand hygiene technique  - Identify and instruct in appropriate isolation precautions for identified infection/condition  Outcome: Progressing     Problem: RESPIRATORY - ADULT  Goal: Achieves optimal ventilation and oxygenation  Description: INTERVENTIONS:  - Assess for changes in respiratory status  - Assess for changes in mentation and behavior  - Position to facilitate oxygenation and minimize respiratory effort  - Oxygen administered by appropriate delivery if ordered  - Initiate smoking cessation education as indicated  - Encourage broncho-pulmonary hygiene including cough, deep breathe, Incentive Spirometry  - Assess the need for suctioning and aspirate as needed  - Assess and instruct to report SOB or any respiratory difficulty  - Respiratory Therapy support as indicated  Outcome: Progressing     Problem: GASTROINTESTINAL - ADULT  Goal: Minimal or absence of nausea and/or vomiting  Description: INTERVENTIONS:  - Administer IV fluids if ordered to ensure adequate hydration  - Maintain NPO status until nausea and vomiting are resolved  - Nasogastric tube if ordered  - Administer ordered antiemetic medications as needed  - Provide nonpharmacologic comfort measures as appropriate  - Advance diet as tolerated, if ordered  - Consider nutrition services referral to assist patient with adequate nutrition and appropriate food choices  Outcome: Progressing  Goal: Maintains or returns to baseline bowel function  Description: INTERVENTIONS:  - Assess bowel function  - Encourage oral fluids to ensure adequate hydration  - Administer IV fluids if ordered to ensure adequate hydration  - Administer ordered medications as needed  - Encourage mobilization and activity  - Consider nutritional services referral to assist patient with adequate nutrition and appropriate food choices  Outcome: Progressing  Goal: Maintains adequate nutritional intake  Description: INTERVENTIONS:  - Monitor percentage of each meal consumed  - Identify factors contributing to decreased intake, treat as appropriate  - Assist with meals as needed  - Monitor I&O, weight, and lab values if indicated  - Obtain nutrition services referral as needed  Outcome: Progressing

## 2022-06-01 NOTE — PROGRESS NOTES
[address] Internal Medicine Progress Note  Patient: Julita Kemp 32 y o  male   MRN: [id number]  PCP: Melissa Hathaway DO  Unit/Bed#: [address] [place] [address] Encounter: [id number]  Date Of Visit: 05/31/22    Problem List:    Principal Problem:    Rectal obstruction  Active Problems:    History of ulcerative colitis    COVID-19 virus infection    CAR (generalized anxiety disorder)    Ankylosing spondylitis ([facility] )    History of Clostridium difficile infection      Assessment & Plan:    * Rectal obstruction  Assessment & Plan  Patient presented with acute onset localized abdominal pain below umbilical region  Reported constant sharp pain and feeling bloated  · History of ulcerative colitis, status post partial colectomy and ileal pouch formation with stricture of rectum  Normally has 8-10 loose to watery BM per day  Had 1 BM around 430pm day prior to admission  · CT showed - partial colonic resection with presumed enterocolic anastomosis, residual distal colon is distended to 8 cm with gas and fecal material  Findings suggest rectal obstruction at the level of surgical anastomosis  No free air  · Patient received IV Zosyn in ED which is being continued  · IV hydration  · NPO initially, advanced to low-fiber/low residue diet  · Flex sig today showed ulcerated area of distal small bowel with yellowish exudate which was biopsied  Ileo anal stricture was dilated  Patient to follow up with his IBD doctor at [facility]      COVID-19 virus infection  [address] [place] [address]  Patient remains asymptomatic  Received COVID vaccine, no booster  Monitor COVID symptoms  History of ulcerative colitis  Assessment & Plan  Status post surgery as noted above    History of Clostridium difficile infection  Assessment & Plan  On p o  Vancomycin for prophylaxis, continue for 2-3 days after antibiotic completion  Ankylosing spondylitis (HCC)  Assessment & Plan  Continue sulfasalazine  Patient was started on sulfasalazine 500mg p o  B i d   For 2 weeks, then 2 tabs in the morning and 1 tab in p m  For 3 weeks, then 2 tabs b i d  on  by his rheumatologist     CAR (generalized anxiety disorder)  Assessment & Plan  Continue Cymbalta            VTE Pharmacologic Prophylaxis:   Low risk, ambulatory    Patient Centered Rounds: I performed bedside rounds with nursing staff today  Discussions with Specialists or Other Care Team Provider: yes     Education and Discussions with Family / Patient: yes    Time Spent for Care: 35 min  More than 50% of total time spent on counseling and coordination of care as described above  Current Length of Stay: 1 day(s)  Current Patient Status: Inpatient   Certification Statement: The patient will continue to require additional inpatient hospital stay due to Rectal obstruction status post pouchoscopy requiring advancement of diet and monitoring overnight  Discharge Plan: Anticipate discharge in 24-48 hrs to home  Code Status: Level 1 - Full Code    Subjective:     Reports some abdominal discomfort after procedure  Denies any vomiting    Objective:     Vitals:   Temp (24hrs), Av 1 °F (36 7 °C), Min:97 7 °F (36 5 °C), Max:98 4 °F (36 9 °C)    Temp:  [97 7 °F (36 5 °C)-98 4 °F (36 9 °C)] 97 9 °F (36 6 °C)  HR:  [69-85] 74  Resp:  [16-20] 17  BP: ()/(50-84) 106/60  SpO2:  [94 %-100 %] 94 %  Body mass index is 26 73 kg/m²  Input and Output Summary (last 24 hours): Intake/Output Summary (Last 24 hours) at 2022 2018  Last data filed at 2022 1401  Gross per 24 hour   Intake 1500 ml   Output --   Net 1500 ml       Physical Exam:   Physical Exam  Constitutional:       General: He is not in acute distress  Appearance: He is not diaphoretic  HENT:      Head: Normocephalic and atraumatic  Eyes:      General:         Right eye: No discharge  Left eye: No discharge  Cardiovascular:      Rate and Rhythm: Normal rate and regular rhythm     Pulmonary:      Effort: Pulmonary effort is normal  No respiratory distress  Breath sounds: Normal breath sounds  No wheezing or rales  Abdominal:      General: Bowel sounds are normal  There is no distension  Palpations: Abdomen is soft  Tenderness: There is no abdominal tenderness  Neurological:      Mental Status: He is alert and oriented to person, place, and time           Additional Data:     Labs:  Results from last 7 days   Lab Units 05/30/22  0242   WBC Thousand/uL 11 96*   HEMOGLOBIN g/dL 12 1   HEMATOCRIT % 40 5   PLATELETS Thousands/uL 505*   NEUTROS PCT % 67   LYMPHS PCT % 20   MONOS PCT % 9   EOS PCT % 2     Results from last 7 days   Lab Units 05/30/22  0242   SODIUM mmol/L 139   POTASSIUM mmol/L 3 6   CHLORIDE mmol/L 104   CO2 mmol/L 26   BUN mg/dL 15   CREATININE mg/dL 1 15   ANION GAP mmol/L 9   CALCIUM mg/dL 9 2   ALBUMIN g/dL 4 0   TOTAL BILIRUBIN mg/dL 0 47   ALK PHOS U/L 85   ALT U/L 46   AST U/L 21   GLUCOSE RANDOM mg/dL 88                       Lines/Drains:  Invasive Devices  Report    Peripheral Intravenous Line  Duration           Peripheral IV 05/30/22 Distal;Right;Upper;Ventral (anterior) Arm 1 day                      Imaging: Reviewed radiology reports from this admission including: procedure reports    Recent Cultures (last 7 days):         Last 24 Hours Medication List:   Current Facility-Administered Medications   Medication Dose Route Frequency Provider Last Rate    DULoxetine  60 mg Oral Daily [place] Reyez      famotidine  20 mg Intravenous Q12H [facility] [place] Reyez      HYDROmorphone  0 5 mg Intravenous Q4H PRN [place] Reyez      Or    HYDROmorphone  1 mg Intravenous Q4H PRN [place] Reyez      lactated ringers  100 mL/hr Intravenous Continuous CuiCARLITOS JeanNP 100 mL/hr (05/31/22 0513)    ondansetron  4 mg Intravenous Q6H PRN [place] Reyez      piperacillin-tazobactam  3 375 g Intravenous Q6H PedroiCARLITOS JeanNP 0 g (05/31/22 0538)    sulfaSALAzine  1,000 mg Oral BID [place] CRNP      vancomycin  125 mg Oral Q12H Boris [address], CRNP          Today, Patient Was Seen By: Gilford Isaac, DO    ** Please Note: "This note has been constructed using a voice recognition system  Therefore there may be syntax, spelling, and/or grammatical errors   Please call if you have any questions  "**

## 2022-06-01 NOTE — DISCHARGE SUMMARY
Discharge Summary - [address] [facility]    Patient Information: Sherice Martinez 32 y o  male MRN: [id number]  Unit/Bed#: [address] [place] [address] Encounter: [id number]    Discharging Physician / Practitioner: Donna Malik DO  PCP: Vaughn Cardoza DO  Admission Date: 5/30/2022  Discharge Date: 06/01/22    Reason for Admission: Abdominal Pain (Patient c/o severe abdominal pain started a few hours ago, patient unable to sit comfortably, last bowel movement was 16:30)      Discharge Diagnoses:     Principal Problem:    Rectal obstruction  Active Problems:    History of ulcerative colitis    COVID-19 virus infection    CAR (generalized anxiety disorder)    Ankylosing spondylitis (HCC)    History of Clostridium difficile infection  Resolved Problems:    * No resolved hospital problems  *        * Rectal obstruction  Assessment & Plan  Patient presented with acute onset localized abdominal pain below umbilical region  Reported constant sharp pain and feeling bloated  · History of ulcerative colitis, status post partial colectomy and ileal pouch formation with stricture of rectum  Normally has 8-10 loose to watery BM per day  Had 1 BM around 430pm day prior to admission  · CT showed - partial colonic resection with presumed enterocolic anastomosis, residual distal colon is distended to 8 cm with gas and fecal material  Findings suggest rectal obstruction at the level of surgical anastomosis  No free air  · Patient received IV Zosyn in ED which was continued  Transitioned to Levaquin and Flagyl on discharge to complete a total of 7 day course of antibiotics  · Received IV hydration  · NPO initially, advanced to low-fiber/low residue diet which he is tolerating  · Flex sig today showed ulcerated area of distal small bowel with yellowish exudate which was biopsied  Ileo anal stricture was dilated    Patient to follow up with his IBD doctor at [facility]      COVID-19 virus infection  [address] [place] [address]  Patient remains asymptomatic  Received COVID vaccine, no booster  No complications from COVID while here    History of ulcerative colitis  Assessment & Plan  Status post surgery as noted above    History of Clostridium difficile infection  Assessment & Plan  On p o  Vancomycin for prophylaxis, continue for total 10 day course of antibiotic    Ankylosing spondylitis ([facility] [address] )  Assessment & Plan  Continue sulfasalazine per rheumatologist    CAR (generalized anxiety disorder)  Assessment & Plan  Continue Cymbalta      Consultations During Hospital Stay:  Carleen Danielek TO GASTROENTEROLOGY    Procedures Performed:     · Pouchoscopy    Significant Findings:     · Refer to hospital course and above listed diagnosis related plan for details    Imaging while in hospital:    CT abdomen pelvis wo contrast    Result Date: 5/30/2022  Narrative: CT ABDOMEN AND PELVIS WITHOUT IV CONTRAST INDICATION:   Abdominal pain, acute, nonlocalized abdominal pain  History of ulcerative colitis status post complete colectomy  J-pouch ileoanal anastomosis with history of chronic recurrent strictures  COMPARISON:  May 14, 2022 TECHNIQUE:  CT examination of the abdomen and pelvis was performed without intravenous contrast  This examination was performed without intravenous contrast in the context of the critical nationwide Omnipaque shortage  Axial, sagittal, and coronal 2D reformatted images were created from the source data and submitted for interpretation  Radiation dose length product (DLP) for this visit:  551 mGy-cm   This examination, like all CT scans performed in the [facility], was performed utilizing techniques to minimize radiation dose exposure, including the use of iterative reconstruction and automated exposure control  Enteric contrast was not administered  FINDINGS: ABDOMEN LOWER CHEST:  No clinically significant abnormality identified in the visualized lower chest  LIVER/BILIARY TREE:  Unremarkable   GALLBLADDER:  No calcified gallstones  No pericholecystic inflammatory change  SPLEEN:  Unremarkable  PANCREAS:  Unremarkable  ADRENAL GLANDS:  Unremarkable  KIDNEYS/URETERS:  Unremarkable  No hydronephrosis  STOMACH AND BOWEL:  Limited evaluation of GI tract without oral contrast   Patient is status post total colectomy and J-pouch ileoanal anastomosis  Gaseous distention of ileal small bowel up to 8 cm with large volume of formed stool above the ileoanal anastomosis which appears narrowed  Dilatation of a distal jejunal/proximal ileal loop up to 6 cm containing gas and partially formed stool  A segment of small bowel in the right upper pelvis demonstrate circumferential wall thickening averaging 4 mm without pneumatosis or surrounding inflammation  The more proximal small bowel averages up to 2 cm diameter containing gas and fluid  Stomach is partially distended  APPENDIX:  Removed ABDOMINOPELVIC CAVITY:  No ascites  No pneumoperitoneum  Right pelvic iliac node measures 1 8 x 1 4 cm, previously 1 8 x 1 4 cm  VESSELS:  Unremarkable for patient's age  PELVIS REPRODUCTIVE ORGANS:  Unremarkable for patient's age  URINARY BLADDER:  Unremarkable  ABDOMINAL WALL/INGUINAL REGIONS:  Small fat-containing inguinal hernias  Stable Postop changes right anterior abdominal wall  OSSEOUS STRUCTURES:  No acute fracture or destructive osseous lesion  Impression: Findings compatible with at least partial small bowel obstruction at the level of the ileoanal anastomosis causing dilatation of distal small bowel up to 8 cm and retention of large volumes of gas and stool  Circumferential Inflammation involving distal jejunum/proximal ileum in the right lower abdomen contributing to a 2nd transition point is also partially obstructing approximately 4 cm beyond the small bowel-small bowel anastomotic line in the right mid to upper abdomen  Stable pelvic adenopathy   Findings are consistent with the preliminary report from [facility] which was provided shortly after completion of the exam   Workstation performed: [id number]     CT abdomen pelvis wo contrast    Result Date: 5/14/2022  Narrative: CT ABDOMEN AND PELVIS WITHOUT IV CONTRAST INDICATION:   r/o complication prior colectomy / revision     vs UC complication  COMPARISON:  None  TECHNIQUE:  CT examination of the abdomen and pelvis was performed without intravenous contrast  This examination was performed without intravenous contrast in the context of the critical nationwide Omnipaque shortage  Axial, sagittal, and coronal 2D reformatted images were created from the source data and submitted for interpretation  Radiation dose length product (DLP) for this visit:  493 mGy-cm   This examination, like all CT scans performed in the [facility], was performed utilizing techniques to minimize radiation dose exposure, including the use of iterative reconstruction and automated exposure control  Enteric contrast was administered  FINDINGS: ABDOMEN LOWER CHEST:  No clinically significant abnormality identified in the visualized lower chest  LIVER/BILIARY TREE:  Unremarkable  GALLBLADDER:  No calcified gallstones  No pericholecystic inflammatory change  SPLEEN:  Unremarkable  PANCREAS:  Unremarkable  ADRENAL GLANDS:  Unremarkable  KIDNEYS/URETERS:  Unremarkable  No hydronephrosis  STOMACH AND BOWEL:  Unremarkable  APPENDIX:  Status post colectomy without obvious complication  No surrounding fluid collection    ABDOMINOPELVIC CAVITY:  No ascites  No pneumoperitoneum  1 3 cm midline pelvic and 1 4 cm right iliac chain lymph nodes are stable (series 2, image 61 and 65)    VESSELS:  Unremarkable for patient's age  PELVIS REPRODUCTIVE ORGANS:  Unremarkable for patient's age  URINARY BLADDER:  Unremarkable  ABDOMINAL WALL/INGUINAL REGIONS:  Unremarkable  OSSEOUS STRUCTURES:  No acute fracture or destructive osseous lesion  Impression: Unremarkable appearance following prior colectomy   No evidence of obstruction or large intra-abdominal collection  Workstation performed: [id number]     Flexible Sigmoidoscopy    Result Date: 5/31/2022  Narrative: [facility] Operating Room [address] [place] [address] [phone] DATE OF SERVICE: 5/31/22 PHYSICIAN(S): Attending: Ivania Sage MD Fellow: No Staff Documented INDICATION: Rectal obstruction POST-OP DIAGNOSIS: See the impression below  HISTORY: Prior colonoscopy: Less than 3 years ago  It is being repeated at an interval of less than 3 years because: This colonoscopy is being performed for a diagnostic indication BOWEL PREPARATION:  PREPROCEDURE: Informed consent was obtained for the procedure, including sedation  Risks including but not limited to bleeding, infection, perforation, adverse drug reaction and aspiration were explained in detail  Also explained about less than 100% sensitivity with the exam and other alternatives  The patient was placed in the left lateral decubitus position  DETAILS OF PROCEDURE: Patient was taken to the procedure room where a time out was performed to confirm correct patient and correct procedure  The patient underwent monitored anesthesia care, which was administered by an anesthesia professional  The patient's blood pressure, heart rate, level of consciousness, oxygen and respirations were monitored throughout the procedure  A digital rectal exam was performed  The scope was introduced through the anus and advanced to the terminal ileum  Retroflexion was performed in the rectum  The patient experienced no blood loss  The procedure was not difficult  The patient tolerated the procedure well  There were no apparent complications  ANESTHESIA INFORMATION: ASA: II Anesthesia Type: IV Sedation with Anesthesia MEDICATIONS: No administrations occurring from 1346 to 1421 on 05/31/22 FINDINGS: Mild, patchy erythematous mucosa; performed cold forceps biopsy   Ileoanal anastomosis was seen, small bowel had some erythema punctate ulcers, biopsied  Ileoanal anastomosis was is open but about 16 to18 mm in caliber  This was dilated with 20 mm balloon for 30 seconds  EVENTS: Procedure Events Event Event Time ENDO SCOPE OUT TIME 5/31/2022  2:04 PM SPECIMENS: ID Type Source Tests Collected by Time Destination 1 : bx-Ileum pouch- ulcerative colitis Tissue Small Bowel, NOS TISSUE EXAM Kiesha Harris MD 5/31/2022  2:05 PM  EQUIPMENT: Colonoscope -     Impression: Ulcerated area distal small bowel with yellowish exudate, biopsied  Ileoanal stricture was dilated to 20 mm with balloon RECOMMENDATION: There is no recommended follow-up for this procedure  Patient will follow-up with his IBD doctor at [facility]  Kiesha Harris MD       Incidental Findings:   · none    Test Results Pending at Discharge (will require follow up):   · As per After Visit Summary     Outpatient Tests Requested:  · none    Complications:  Refer to hospital course and above listed diagnosis related plan, if any    Hospital Course:     Rob Roth is a 32 y o  male patient who originally presented to the hospital on 5/30/2022 due to Abdominal pain which started while he was at work  Stated that the pain was below the umbilicus and sharp in nature with no nausea or vomiting  Decreased stool output  On imaging patient was noted to have findings suggestive of rectal obstruction and was admitted  Patient was seen by GI and underwent pouchoscopy  He was treated with IV antibiotics and cleared by GI prior to discharge  Discharge plan discussed with patient    Please see above list of diagnoses and related plan for additional information         Condition at Discharge: stable     Discharge Day Visit / Exam:     Subjective:  Reports abdominal pain but states that it is tolerable and wants to go home    Vitals: Blood Pressure: 119/89 (06/01/22 1700)  Pulse: 66 (06/01/22 1700)  Temperature: 98 2 °F (36 8 °C) (06/01/22 1700)  Temp Source: Oral (06/01/22 1700)  Respirations: 18 (06/01/22 1700)  Height: 5' 9" (175 3 cm) (05/31/22 1221)  Weight - Scale: 82 1 kg (181 lb) (05/31/22 1221)  SpO2: 100 % (06/01/22 1255)  Exam:   Physical Exam  Constitutional:       General: He is not in acute distress  Appearance: He is not diaphoretic  HENT:      Head: Normocephalic and atraumatic  Eyes:      General:         Right eye: No discharge  Left eye: No discharge  Cardiovascular:      Rate and Rhythm: Normal rate and regular rhythm  Pulmonary:      Effort: Pulmonary effort is normal  No respiratory distress  Breath sounds: Normal breath sounds  No wheezing or rales  Abdominal:      General: Bowel sounds are normal  There is no distension  Palpations: Abdomen is soft  Tenderness: There is no abdominal tenderness  Musculoskeletal:      Right lower leg: No edema  Left lower leg: No edema  Neurological:      Mental Status: He is alert and oriented to person, place, and time  Discharge instructions/Information to patient and family:(Discharge Medications and Follow up):   See after visit summary for information provided to patient and family  Provisions for Follow-Up Care:  See after visit summary for information related to follow-up care and any pertinent home health orders  Disposition: Home    Planned Readmission:  No     Discharge Statement:  I spent 35 minutes discharging the patient  This time was spent on the day of discharge  I had direct contact with the patient on the day of discharge  Greater than 50% of the total time was spent examining patient, answering all patient questions, arranging and discussing plan of care with patient as well as directly providing post-discharge instructions  Additional time then spent on discharge activities  Discharge Medications:  See after visit summary for reconciled discharge medications provided to patient and family        ** Please Note: "This note has been constructed using a voice recognition system  Therefore there may be syntax, spelling, and/or grammatical errors   Please call if you have any questions  "**

## 2022-06-01 NOTE — ASSESSMENT & PLAN NOTE
Patient presented with acute onset localized abdominal pain below umbilical region  Reported constant sharp pain and feeling bloated  · History of ulcerative colitis, status post partial colectomy and ileal pouch formation with stricture of rectum  Normally has 8-10 loose to watery BM per day  Had 1 BM around 430pm day prior to admission  · CT showed - partial colonic resection with presumed enterocolic anastomosis, residual distal colon is distended to 8 cm with gas and fecal material  Findings suggest rectal obstruction at the level of surgical anastomosis  No free air  · Patient received IV Zosyn in ED which was continued  Transitioned to Levaquin and Flagyl on discharge to complete a total of 7 day course of antibiotics  · Received IV hydration  · NPO initially, advanced to low-fiber/low residue diet which he is tolerating  · Flex sig today showed ulcerated area of distal small bowel with yellowish exudate which was biopsied  Ileo anal stricture was dilated    Patient to follow up with his IBD doctor at [facility]

## 2022-06-01 NOTE — PLAN OF CARE
Problem: Nutrition/Hydration-ADULT  Goal: Nutrient/Hydration intake appropriate for improving, restoring or maintaining nutritional needs  Description: Monitor and assess patient's nutrition/hydration status for malnutrition  Collaborate with interdisciplinary team and initiate plan and interventions as ordered  Monitor patient's weight and dietary intake as ordered or per policy  Utilize nutrition screening tool and intervene as necessary  Determine patient's food preferences and provide high-protein, high-caloric foods as appropriate       INTERVENTIONS:  - Monitor oral intake, urinary output, labs, and treatment plans  - Assess nutrition and hydration status and recommend course of action  - Evaluate amount of meals eaten  - Assist patient with eating if necessary   - Allow adequate time for meals  - Recommend/ encourage appropriate diets, oral nutritional supplements, and vitamin/mineral supplements  - Order, calculate, and assess calorie counts as needed    Problem: PAIN - ADULT  Goal: Verbalizes/displays adequate comfort level or baseline comfort level  Description: Interventions:  - Encourage patient to monitor pain and request assistance  - Assess pain using appropriate pain scale  - Administer analgesics based on type and severity of pain and evaluate response  - Implement non-pharmacological measures as appropriate and evaluate response  - Consider cultural and social influences on pain and pain management  - Notify physician/advanced practitioner if interventions unsuccessful or patient reports new pain  Outcome: Progressing     Problem: RESPIRATORY - ADULT  Goal: Achieves optimal ventilation and oxygenation  Description: INTERVENTIONS:  - Assess for changes in respiratory status  - Assess for changes in mentation and behavior  - Position to facilitate oxygenation and minimize respiratory effort  - Oxygen administered by appropriate delivery if ordered  - Initiate smoking cessation education as indicated  - Encourage broncho-pulmonary hygiene including cough, deep breathe, Incentive Spirometry  - Assess the need for suctioning and aspirate as needed  - Assess and instruct to report SOB or any respiratory difficulty  - Respiratory Therapy support as indicated  Outcome: Progressing   - Assess need for intravenous fluids  - Provide specific nutrition/hydration education as appropriate  - Include patient/family/caregiver in decisions related to nutrition  Outcome: Progressing

## 2022-06-01 NOTE — ASSESSMENT & PLAN NOTE
Patient remains asymptomatic  Received COVID vaccine, no booster    No complications from COVID while here

## 2022-06-01 NOTE — PROGRESS NOTES
Progress Note - Lesly Alpers 32 y o  male MRN: [id number]    Unit/Bed#: [address] [facility] [facility] Encounter: [id number]        Assessment/Plan: This is a 49-year-old male with history of ulcerative colitis status post total colectomy with J-pouch ileoanal anastomosis in 2015 followed by chronic recurrent strictures, ankylosing spondylitis on tofacitinab  who presented to the hospital with inability to pass stool  Patient on Azulfidine at home  Noted to be COVID positive on admission  1  Rectal obstruction-  -was started empirically start patient on treatment for pouchitis, previous pouchoscopy was notable for erythema/edema and ulcerations  Patient was started on Zosyn and vanco prophylactically with history of C diff   -underwent pouchoscopy showing ulcerated area distal small bowel with yellowish exudate, biopsied  Ileoanal stricture was dilated to 20 mm with balloon   -patient was complaining of pain postprocedure-diet was advanced to low residue  - will complete antibiotic course for 7 days and complete 10 days of vancomycin for C diff prophylaxis  -patient will follow-up with [facility] with surgeon as well as gastroenterologist  -spoke with hospitalist regarding case and plan for discharge today    Subjective:     Patient is lying in bed  Patient did have procedure yesterday and then developed abdominal pain in lower abdomen but has since resolved since he moved his bowels  Patient accidentally was NPO today but he did eat last night        Objective:     Vitals: /57 (BP Location: Left arm)   Pulse 70   Temp 98 2 °F (36 8 °C) (Oral)   Resp 20   Ht 5' 9" (1 753 m)   Wt 82 1 kg (181 lb)   SpO2 100%   BMI 26 73 kg/m²       Physical Exam:  Gen-alert no acute distress  Abd-positive bowel sounds, nondistended, mild tenderness palpation of suprapubic area, no rebound rigidity or guarding       Lab, Imaging and other studies:   Recent Results (from the past 72 hour(s))   CBC and differential    Collection Time: 05/30/22  2:42 AM   Result Value Ref Range    WBC 11 96 (H) 4 31 - 10 16 Thousand/uL    RBC 6 40 (H) 3 88 - 5 62 Million/uL    Hemoglobin 12 1 12 0 - 17 0 g/dL    Hematocrit 40 5 36 5 - 49 3 %    MCV 63 (L) 82 - 98 fL    MCH 18 9 (L) 26 8 - 34 3 pg    MCHC 29 9 (L) 31 4 - 37 4 g/dL    RDW 18 1 (H) 11 6 - 15 1 %    MPV 8 9 8 9 - 12 7 fL    Platelets 765 (H) 366 - 390 Thousands/uL    nRBC 0 /100 WBCs    Neutrophils Relative 67 43 - 75 %    Immat GRANS % 1 0 - 2 %    Lymphocytes Relative 20 14 - 44 %    Monocytes Relative 9 4 - 12 %    Eosinophils Relative 2 0 - 6 %    Basophils Relative 1 0 - 1 %    Neutrophils Absolute 7 98 (H) 1 85 - 7 62 Thousands/µL    Immature Grans Absolute 0 06 0 00 - 0 20 Thousand/uL    Lymphocytes Absolute 2 44 0 60 - 4 47 Thousands/µL    Monocytes Absolute 1 10 0 17 - 1 22 Thousand/µL    Eosinophils Absolute 0 23 0 00 - 0 61 Thousand/µL    Basophils Absolute 0 15 (H) 0 00 - 0 10 Thousands/µL   Comprehensive metabolic panel    Collection Time: 05/30/22  2:42 AM   Result Value Ref Range    Sodium 139 136 - 145 mmol/L    Potassium 3 6 3 5 - 5 3 mmol/L    Chloride 104 100 - 108 mmol/L    CO2 26 21 - 32 mmol/L    ANION GAP 9 4 - 13 mmol/L    BUN 15 5 - 25 mg/dL    Creatinine 1 15 0 60 - 1 30 mg/dL    Glucose 88 65 - 140 mg/dL    Calcium 9 2 8 3 - 10 1 mg/dL    AST 21 5 - 45 U/L    ALT 46 12 - 78 U/L    Alkaline Phosphatase 85 46 - 116 U/L    Total Protein 7 7 6 4 - 8 2 g/dL    Albumin 4 0 3 5 - 5 0 g/dL    Total Bilirubin 0 47 0 20 - 1 00 mg/dL    eGFR 85 ml/min/1 73sq m   Lipase    Collection Time: 05/30/22  2:42 AM   Result Value Ref Range    Lipase 177 73 - 393 u/L   COVID/FLU/RSV - 2 hour TAT    Collection Time: 05/30/22  4:12 AM    Specimen: Nose; Nares   Result Value Ref Range    SARS-CoV-2 Positive (A) Negative    INFLUENZA A PCR Negative Negative    INFLUENZA B PCR Negative Negative    RSV PCR Negative Negative   Basic metabolic panel    Collection Time: 06/01/22  4:43 AM   Result Value Ref Range    Sodium 138 136 - 145 mmol/L    Potassium 4 0 3 5 - 5 3 mmol/L    Chloride 103 100 - 108 mmol/L    CO2 29 21 - 32 mmol/L    ANION GAP 6 4 - 13 mmol/L    BUN 8 5 - 25 mg/dL    Creatinine 1 05 0 60 - 1 30 mg/dL    Glucose 67 65 - 140 mg/dL    Calcium 8 5 8 3 - 10 1 mg/dL    eGFR 95 ml/min/1 73sq m   CBC    Collection Time: 06/01/22  4:43 AM   Result Value Ref Range    WBC 9 20 4  31 - 10 16 Thousand/uL    RBC 5 25 3 88 - 5 62 Million/uL    Hemoglobin 10 0 (L) 12 0 - 17 0 g/dL    Hematocrit 33 9 (L) 36 5 - 49 3 %    MCV 65 (L) 82 - 98 fL    MCH 19 0 (L) 26 8 - 34 3 pg    MCHC 29 5 (L) 31 4 - 37 4 g/dL    RDW 16 6 (H) 11 6 - 15 1 %    Platelets 430 (H) 960 - 390 Thousands/uL    MPV 9 1 8 9 - 12 7 fL

## 2022-06-01 NOTE — NURSING NOTE
Discharge to home via W/C accompanied by PCA AVS reviewed, written instructions given to patient, patient verbalized understanding, no written prescriptions given, IV removed per protocol, masimo removed per protocol, All belongings taken

## 2022-06-02 ENCOUNTER — TRANSITIONAL CARE MANAGEMENT (OUTPATIENT)
Dept: FAMILY MEDICINE CLINIC | Facility: CLINIC | Age: 27
End: 2022-06-02

## 2022-06-02 ENCOUNTER — APPOINTMENT (EMERGENCY)
Dept: RADIOLOGY | Facility: HOSPITAL | Age: 27
End: 2022-06-02
Payer: COMMERCIAL

## 2022-06-02 ENCOUNTER — TELEPHONE (OUTPATIENT)
Dept: FAMILY MEDICINE CLINIC | Facility: CLINIC | Age: 27
End: 2022-06-02

## 2022-06-02 VITALS
HEART RATE: 82 BPM | OXYGEN SATURATION: 96 % | TEMPERATURE: 98.6 F | SYSTOLIC BLOOD PRESSURE: 121 MMHG | RESPIRATION RATE: 16 BRPM | DIASTOLIC BLOOD PRESSURE: 65 MMHG

## 2022-06-02 LAB
ALBUMIN SERPL BCP-MCNC: 3.8 G/DL (ref 3.5–5)
ALP SERPL-CCNC: 87 U/L (ref 46–116)
ALT SERPL W P-5'-P-CCNC: 30 U/L (ref 12–78)
ANION GAP SERPL CALCULATED.3IONS-SCNC: 8 MMOL/L (ref 4–13)
AST SERPL W P-5'-P-CCNC: 14 U/L (ref 5–45)
BASOPHILS # BLD AUTO: 0.1 THOUSANDS/ΜL (ref 0–0.1)
BASOPHILS NFR BLD AUTO: 1 % (ref 0–1)
BILIRUB SERPL-MCNC: 0.32 MG/DL (ref 0.2–1)
BUN SERPL-MCNC: 7 MG/DL (ref 5–25)
CALCIUM SERPL-MCNC: 9.2 MG/DL (ref 8.3–10.1)
CHLORIDE SERPL-SCNC: 101 MMOL/L (ref 100–108)
CO2 SERPL-SCNC: 30 MMOL/L (ref 21–32)
CREAT SERPL-MCNC: 1.35 MG/DL (ref 0.6–1.3)
EOSINOPHIL # BLD AUTO: 0.12 THOUSAND/ΜL (ref 0–0.61)
EOSINOPHIL NFR BLD AUTO: 1 % (ref 0–6)
ERYTHROCYTE [DISTWIDTH] IN BLOOD BY AUTOMATED COUNT: 17.9 % (ref 11.6–15.1)
GFR SERPL CREATININE-BSD FRML MDRD: 70 ML/MIN/1.73SQ M
GLUCOSE SERPL-MCNC: 109 MG/DL (ref 65–140)
HCT VFR BLD AUTO: 38.2 % (ref 36.5–49.3)
HGB BLD-MCNC: 11.3 G/DL (ref 12–17)
IMM GRANULOCYTES # BLD AUTO: 0.03 THOUSAND/UL (ref 0–0.2)
IMM GRANULOCYTES NFR BLD AUTO: 0 % (ref 0–2)
LACTATE SERPL-SCNC: 1.9 MMOL/L (ref 0.5–2)
LIPASE SERPL-CCNC: 94 U/L (ref 73–393)
LYMPHOCYTES # BLD AUTO: 0.89 THOUSANDS/ΜL (ref 0.6–4.47)
LYMPHOCYTES NFR BLD AUTO: 9 % (ref 14–44)
MAGNESIUM SERPL-MCNC: 2 MG/DL (ref 1.6–2.6)
MCH RBC QN AUTO: 18.9 PG (ref 26.8–34.3)
MCHC RBC AUTO-ENTMCNC: 29.6 G/DL (ref 31.4–37.4)
MCV RBC AUTO: 64 FL (ref 82–98)
MONOCYTES # BLD AUTO: 0.67 THOUSAND/ΜL (ref 0.17–1.22)
MONOCYTES NFR BLD AUTO: 7 % (ref 4–12)
NEUTROPHILS # BLD AUTO: 8.04 THOUSANDS/ΜL (ref 1.85–7.62)
NEUTS SEG NFR BLD AUTO: 82 % (ref 43–75)
NRBC BLD AUTO-RTO: 0 /100 WBCS
PLATELET # BLD AUTO: 436 THOUSANDS/UL (ref 149–390)
PMV BLD AUTO: 8.5 FL (ref 8.9–12.7)
POTASSIUM SERPL-SCNC: 3.5 MMOL/L (ref 3.5–5.3)
PROT SERPL-MCNC: 7.7 G/DL (ref 6.4–8.2)
RBC # BLD AUTO: 5.98 MILLION/UL (ref 3.88–5.62)
SODIUM SERPL-SCNC: 139 MMOL/L (ref 136–145)
WBC # BLD AUTO: 9.85 THOUSAND/UL (ref 4.31–10.16)

## 2022-06-02 PROCEDURE — 83690 ASSAY OF LIPASE: CPT | Performed by: EMERGENCY MEDICINE

## 2022-06-02 PROCEDURE — 74022 RADEX COMPL AQT ABD SERIES: CPT

## 2022-06-02 PROCEDURE — 85025 COMPLETE CBC W/AUTO DIFF WBC: CPT | Performed by: EMERGENCY MEDICINE

## 2022-06-02 PROCEDURE — 96376 TX/PRO/DX INJ SAME DRUG ADON: CPT

## 2022-06-02 PROCEDURE — 96375 TX/PRO/DX INJ NEW DRUG ADDON: CPT

## 2022-06-02 PROCEDURE — 74176 CT ABD & PELVIS W/O CONTRAST: CPT

## 2022-06-02 PROCEDURE — G1004 CDSM NDSC: HCPCS

## 2022-06-02 PROCEDURE — 80053 COMPREHEN METABOLIC PANEL: CPT | Performed by: EMERGENCY MEDICINE

## 2022-06-02 PROCEDURE — 83605 ASSAY OF LACTIC ACID: CPT | Performed by: EMERGENCY MEDICINE

## 2022-06-02 PROCEDURE — 96361 HYDRATE IV INFUSION ADD-ON: CPT

## 2022-06-02 PROCEDURE — 99285 EMERGENCY DEPT VISIT HI MDM: CPT | Performed by: EMERGENCY MEDICINE

## 2022-06-02 PROCEDURE — 83735 ASSAY OF MAGNESIUM: CPT | Performed by: EMERGENCY MEDICINE

## 2022-06-02 PROCEDURE — 36415 COLL VENOUS BLD VENIPUNCTURE: CPT | Performed by: EMERGENCY MEDICINE

## 2022-06-02 PROCEDURE — 96374 THER/PROPH/DIAG INJ IV PUSH: CPT

## 2022-06-02 RX ORDER — HYDROMORPHONE HCL/PF 1 MG/ML
1 SYRINGE (ML) INJECTION ONCE
Status: COMPLETED | OUTPATIENT
Start: 2022-06-02 | End: 2022-06-02

## 2022-06-02 RX ORDER — MORPHINE SULFATE 15 MG/1
15 TABLET ORAL EVERY 4 HOURS PRN
Qty: 30 TABLET | Refills: 0 | Status: SHIPPED | OUTPATIENT
Start: 2022-06-02 | End: 2022-06-07

## 2022-06-02 RX ORDER — KETAMINE HCL IN NACL, ISO-OSM 100MG/10ML
0.3 SYRINGE (ML) INJECTION ONCE
Status: COMPLETED | OUTPATIENT
Start: 2022-06-02 | End: 2022-06-02

## 2022-06-02 RX ORDER — MORPHINE SULFATE 4 MG/ML
4 INJECTION, SOLUTION INTRAMUSCULAR; INTRAVENOUS ONCE
Status: COMPLETED | OUTPATIENT
Start: 2022-06-02 | End: 2022-06-02

## 2022-06-02 RX ORDER — OLANZAPINE 5 MG/1
10 TABLET, ORALLY DISINTEGRATING ORAL ONCE
Status: COMPLETED | OUTPATIENT
Start: 2022-06-02 | End: 2022-06-02

## 2022-06-02 RX ORDER — ONDANSETRON 2 MG/ML
4 INJECTION INTRAMUSCULAR; INTRAVENOUS ONCE
Status: COMPLETED | OUTPATIENT
Start: 2022-06-02 | End: 2022-06-02

## 2022-06-02 RX ADMIN — Medication 25 MG: at 01:56

## 2022-06-02 RX ADMIN — SODIUM CHLORIDE 1000 ML: 0.9 INJECTION, SOLUTION INTRAVENOUS at 00:33

## 2022-06-02 RX ADMIN — MORPHINE SULFATE 4 MG: 4 INJECTION INTRAVENOUS at 09:18

## 2022-06-02 RX ADMIN — MORPHINE SULFATE 4 MG: 4 INJECTION INTRAVENOUS at 02:37

## 2022-06-02 RX ADMIN — HYDROMORPHONE HYDROCHLORIDE 1 MG: 1 INJECTION, SOLUTION INTRAMUSCULAR; INTRAVENOUS; SUBCUTANEOUS at 00:34

## 2022-06-02 RX ADMIN — MORPHINE SULFATE 4 MG: 4 INJECTION INTRAVENOUS at 08:21

## 2022-06-02 RX ADMIN — OLANZAPINE 10 MG: 5 TABLET, ORALLY DISINTEGRATING ORAL at 01:24

## 2022-06-02 RX ADMIN — ONDANSETRON 4 MG: 2 INJECTION INTRAMUSCULAR; INTRAVENOUS at 01:01

## 2022-06-02 RX ADMIN — HYDROMORPHONE HYDROCHLORIDE 1 MG: 1 INJECTION, SOLUTION INTRAMUSCULAR; INTRAVENOUS; SUBCUTANEOUS at 07:21

## 2022-06-02 RX ADMIN — HYDROMORPHONE HYDROCHLORIDE 1 MG: 1 INJECTION, SOLUTION INTRAMUSCULAR; INTRAVENOUS; SUBCUTANEOUS at 01:02

## 2022-06-02 NOTE — ED NOTES
Patient asked for primary RN; states, "Yeah everything wore off and I'm in severe pain again  I felt good for a while but now my insides feel like they are on fire again  Please let the doc know "     Dr Greg Ferro made aware        [facility]  06/02/22 9819

## 2022-06-02 NOTE — UTILIZATION REVIEW
Notification of Discharge   This is a Notification of Discharge from our facility [address]  Please be advised that this patient has been discharge from our facility  Below you will find the admission and discharge date and time including the patients disposition  UTILIZATION REVIEW CONTACT:  Chucho Polk  Utilization   Network Utilization Review Department  Phone: [phone] x carefully listen to the prompts  All voicemails are confidential   Email: [email or web address]     PHYSICIAN ADVISORY SERVICES:  FOR [id number] REVIEW - MEDICAL NECESSITY DENIAL  Phone: [phone]  Fax: [phone]  Email: [email or web address]     PRESENTATION DATE: 5/30/2022  2:24 AM  OBERVATION ADMISSION DATE:   INPATIENT ADMISSION DATE: 5/30/22  4:46 AM   DISCHARGE DATE: 6/1/2022  7:40 PM  DISPOSITION: Home/Self Care Home/Self Care      IMPORTANT INFORMATION:  Send all requests for admission clinical reviews, approved or denied determinations and any other requests to dedicated fax number below belonging to the campus where the patient is receiving treatment   List of dedicated fax numbers:  [address] DENIALS (Administrative/Medical Necessity) [phone]   [address] (Maternity/NICU/Pediatrics) [phone]   [place] Notice [phone]   130 [facility] [phone]   [address] [place] [address] [phone]   [address] [phone]   [facility]  [phone]   2205 [facility] [address]  2401 [facility] 1000 [address] [facility] [phone]

## 2022-06-02 NOTE — ED CARE HANDOFF
Emergency Department Sign Out Note        Sign out and transfer of care from previous provider  See Separate Emergency Department note  The patient, Clarissa Waldrop, was evaluated by the previous provider for abdominal pain  Workup Completed:  Blood work, CT abdomen/pelvis    ED Course / Workup Pending (followup):  Repeat CT abdomen/pelvis                                  ED Course as of 06/02/22 0907   Thu Jun 02, 2022   0709 Patient pending disposition based on 2nd CT results  9603 Patient re-examined at bedside  Patient is feeling better with pain medications  Patient reached out to his surgical team [facility] who will see him for further management in a few days  Patient states that he does not have any more pain medications at home  Will discharge patient home on morphine  Procedures  MDM  Number of Diagnoses or Management Options  Risk of Complications, Morbidity, and/or Mortality  General comments: Patient presented today with abdominal pain  Patient has history of small-bowel obstruction secondary to multiple abdominal surgeries  Patient had 2 CT scans in the ED with no signs of obstruction  At this time patient would like to go home and follow-up with his own colorectal surgeon  Patient is discharged home with morphine p r n  Pain  Close return instructions given to return to the ER for any worsening symptoms  Patient agrees with discharge plan  Patient well appearing at time of discharge  Please Note: Fluency Direct voice recognition software may have been used in the creation of this document  Wrong words or sound a like substitutions may have occurred due to the inherent limitations of the voice software         Patient Progress  Patient progress: stable          Disposition  Final diagnoses:   Abdominal pain     Time reflects when diagnosis was documented in both MDM as applicable and the Disposition within this note     Time User Action Codes Description Comment 6/2/2022  9:01 AM Loyalhannaky Cuellar Add [W33 35] Annual physical exam     6/2/2022  9:01 AM Loyalhannaky Cuellar Remove [E86 09] Annual physical exam     6/2/2022  9:01 AM Hunterky Cuellar Add [R10 9] Abdominal pain       ED Disposition     ED Disposition   Discharge    Condition   Stable    Date/Time   Thu Jun 2, 2022  9:01 AM    Girish Martinez discharge to home/self care  Follow-up Information     Follow up With Specialties Details Why Contact Info    Your colorectal surgeon in [facility]  Schedule an appointment as soon as possible for a visit       Vaughn Cardoza, [address] In 2 days  [address]  [phone]          Patient's Medications   Discharge Prescriptions    MORPHINE (MSIR) 15 MG TABLET    Take 1 tablet (15 mg total) by mouth every 4 (four) hours as needed for severe pain for up to 10 days Max Daily Amount: 90 mg       Start Date: 6/2/2022  End Date: 6/12/2022       Order Dose: 15 mg       Quantity: 30 tablet    Refills: 0     No discharge procedures on file         ED Provider  Electronically Signed by     Avelina Jones DO  06/02/22 5348

## 2022-06-02 NOTE — ED PROVIDER NOTES
History  Chief Complaint   Patient presents with    Abdominal Pain     Discharged today after abdominal procedure; woke up with severe lower middle abdominal pain around 2345     C/o of abdominal pain sharp continuous nothing makes it better or worse, associated nausea, no vomiting, no fevers, no other symptoms  No cough,chills  History of complicated abdominal history with ileitis, pouchitis and recent admit and discharge for an SBO or UC flare  History provided by:  Patient   used: No        Prior to Admission Medications   Prescriptions Last Dose Informant Patient Reported? Taking? DULoxetine (CYMBALTA) 60 mg delayed release capsule   No No   Sig: Take 1 capsule (60 mg total) by mouth daily   levofloxacin (LEVAQUIN) 500 mg tablet   No No   Sig: Take 1 tablet (500 mg total) by mouth daily for 5 days   metroNIDAZOLE (FLAGYL) 500 mg tablet   No No   Sig: Take 1 tablet (500 mg total) by mouth every 8 (eight) hours for 5 days   sulfaSALAzine (AZULFIDINE) 500 mg tablet   No No   Sig: Take 2 tablets (1,000 mg total) by mouth 2 (two) times a day   vancomycin (VANCOCIN) 125 MG capsule   No No   Sig: Take 1 capsule (125 mg total) by mouth every 12 (twelve) hours for 15 doses      Facility-Administered Medications: None       Past Medical History:   Diagnosis Date    Ankylosing spondylitis (HCC)     Anxiety     Bowel obstruction (HCC)     Clostridium difficile colitis 9/13/2018    Colitis     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[facility] [address] ) 9/13/2018    Pancreatitis     Ulcerative colitis ([facility] [address] )        Past Surgical History:   Procedure Laterality Date    COLECTOMY TOTAL      with ileal pouch and anastemosis    IR PICC PLACEMENT SINGLE LUMEN  3/1/2022    TOTAL COLECTOMY         History reviewed  No pertinent family history  I have reviewed and agree with the history as documented      E-Cigarette/Vaping    E-Cigarette Use Never User      E-Cigarette/Vaping Substances    Nicotine No     THC No     CBD No     Flavoring No     Other No     Unknown No      Social History     Tobacco Use    Smoking status: Never Smoker    Smokeless tobacco: Never Used   Vaping Use    Vaping Use: Never used   Substance Use Topics    Alcohol use: Yes     Comment: pt states 5 a month/socially    Drug use: No       Review of Systems   Constitutional: Negative  HENT: Negative  Eyes: Negative  Respiratory: Negative  Cardiovascular: Negative  Gastrointestinal: Positive for abdominal pain and nausea  Negative for constipation, diarrhea and vomiting  Endocrine: Negative  Genitourinary: Negative  Musculoskeletal: Negative  Skin: Negative  Allergic/Immunologic: Negative  Neurological: Negative  Hematological: Negative  Psychiatric/Behavioral: Negative  All other systems reviewed and are negative  Physical Exam  Physical Exam  Constitutional:       Appearance: Normal appearance  HENT:      Head: Normocephalic and atraumatic  Nose: Nose normal       Mouth/Throat:      Mouth: Mucous membranes are moist    Eyes:      Extraocular Movements: Extraocular movements intact  Pupils: Pupils are equal, round, and reactive to light  Cardiovascular:      Rate and Rhythm: Normal rate and regular rhythm  Pulmonary:      Effort: Pulmonary effort is normal       Breath sounds: Normal breath sounds  Abdominal:      General: Abdomen is flat  Bowel sounds are normal       Palpations: Abdomen is soft  Tenderness: There is abdominal tenderness in the left lower quadrant  There is no right CVA tenderness, left CVA tenderness, guarding or rebound  Negative signs include Beatty's sign, Rovsing's sign, McBurney's sign, psoas sign and obturator sign  Musculoskeletal:         General: Normal range of motion  Cervical back: Normal range of motion and neck supple  Skin:     General: Skin is warm  Capillary Refill: Capillary refill takes less than 2 seconds     Neurological:      General: No focal deficit present  Mental Status: He is alert and oriented to person, place, and time  Mental status is at baseline  Psychiatric:         Mood and Affect: Mood normal          Thought Content:  Thought content normal          Vital Signs  ED Triage Vitals   Temperature Pulse Respirations Blood Pressure SpO2   06/02/22 0018 06/02/22 0004 06/02/22 0004 06/02/22 0004 06/02/22 0004   98 6 °F (37 °C) 93 18 155/85 98 %      Temp Source Heart Rate Source Patient Position - Orthostatic VS BP Location FiO2 (%)   06/02/22 0018 06/02/22 0004 06/02/22 0004 06/02/22 0004 --   Oral Monitor Sitting Right arm       Pain Score       06/02/22 0004       8           Vitals:    06/02/22 0645 06/02/22 0724 06/02/22 0824 06/02/22 0919   BP: 108/62  133/90 121/65   Pulse: 58 66  82   Patient Position - Orthostatic VS: Sitting            Visual Acuity      ED Medications  Medications   sodium chloride 0 9 % bolus 1,000 mL (0 mL Intravenous Stopped 6/2/22 0325)   HYDROmorphone (DILAUDID) injection 1 mg (1 mg Intravenous Given 6/2/22 0034)   ondansetron (ZOFRAN) injection 4 mg (4 mg Intravenous Given 6/2/22 0101)   HYDROmorphone (DILAUDID) injection 1 mg (1 mg Intravenous Given 6/2/22 0102)   barium (READI-CAT 2) suspension 900 mL (900 mL Oral Given 6/2/22 0116)   OLANZapine (ZyPREXA ZYDIS) dispersible tablet 10 mg (10 mg Oral Given 6/2/22 0124)   Ketamine HCl 25 mg (25 mg Intravenous Given 6/2/22 0156)   morphine (PF) 4 mg/mL injection 4 mg (4 mg Intravenous Given 6/2/22 0237)   HYDROmorphone (DILAUDID) injection 1 mg (1 mg Intravenous Given 6/2/22 0721)   morphine (PF) 4 mg/mL injection 4 mg (4 mg Intravenous Given 6/2/22 0821)   morphine (PF) 4 mg/mL injection 4 mg (4 mg Intravenous Given 6/2/22 0918)       Diagnostic Studies  Results Reviewed     Procedure Component Value Units Date/Time    Comprehensive metabolic panel [[id number]]  (Abnormal) Collected: 06/02/22 0026    Lab Status: Final result Specimen: Blood from Arm, Left Updated: 06/02/22 0054     Sodium 139 mmol/L      Potassium 3 5 mmol/L      Chloride 101 mmol/L      CO2 30 mmol/L      ANION GAP 8 mmol/L      BUN 7 mg/dL      Creatinine 1 35 mg/dL      Glucose 109 mg/dL      Calcium 9 2 mg/dL      AST 14 U/L      ALT 30 U/L      Alkaline Phosphatase 87 U/L      Total Protein 7 7 g/dL      Albumin 3 8 g/dL      Total Bilirubin 0 32 mg/dL      eGFR 70 ml/min/1 73sq m     Narrative:      [place] guidelines for Chronic Kidney Disease (CKD):     Stage 1 with normal or high GFR (GFR > 90 mL/min/1 73 square meters)    Stage 2 Mild CKD (GFR = 60-89 mL/min/1 73 square meters)    Stage 3A Moderate CKD (GFR = 45-59 mL/min/1 73 square meters)    Stage 3B Moderate CKD (GFR = 30-44 mL/min/1 73 square meters)    Stage 4 Severe CKD (GFR = 15-29 mL/min/1 73 square meters)    Stage 5 End Stage CKD (GFR <15 mL/min/1 73 square meters)  Note: GFR calculation is accurate only with a steady state creatinine    Magnesium [[id number]]  (Normal) Collected: 06/02/22 0026    Lab Status: Final result Specimen: Blood from Arm, Left Updated: 06/02/22 0054     Magnesium 2 0 mg/dL     Lipase [[id number]]  (Normal) Collected: 06/02/22 0026    Lab Status: Final result Specimen: Blood from Arm, Left Updated: 06/02/22 0054     Lipase 94 u/L     Lactic acid [[id number]]  (Normal) Collected: 06/02/22 0026    Lab Status: Final result Specimen: Blood from Arm, Left Updated: 06/02/22 0053     LACTIC ACID 1 9 mmol/L     Narrative:      Result may be elevated if tourniquet was used during collection      CBC and differential [[id number]]  (Abnormal) Collected: 06/02/22 0026    Lab Status: Final result Specimen: Blood from Arm, Left Updated: 06/02/22 0032     WBC 9 85 Thousand/uL      RBC 5 98 Million/uL      Hemoglobin 11 3 g/dL      Hematocrit 38 2 %      MCV 64 fL      MCH 18 9 pg      MCHC 29 6 g/dL      RDW 17 9 %      MPV 8 5 fL      Platelets 173 Thousands/uL      nRBC 0 /100 WBCs Neutrophils Relative 82 %      Immat GRANS % 0 %      Lymphocytes Relative 9 %      Monocytes Relative 7 %      Eosinophils Relative 1 %      Basophils Relative 1 %      Neutrophils Absolute 8 04 Thousands/µL      Immature Grans Absolute 0 03 Thousand/uL      Lymphocytes Absolute 0 89 Thousands/µL      Monocytes Absolute 0 67 Thousand/µL      Eosinophils Absolute 0 12 Thousand/µL      Basophils Absolute 0 10 Thousands/µL                  CT abdomen pelvis wo contrast   Final Result by Tejal Apple MD (06/02 5113)      Enteric contrast administered for examination performed approximately 4 hours earlier has now reached the ileal pouch indicating that there is no bowel obstruction  Reidentification of abnormally enlarged right pelvic node, unchanged in size from recent prior examinations  Subtle patchy groundglass airspace opacities in the lower lung zones suspicious for infectious/inflammatory process  Workstation performed: [id number]         CT abdomen pelvis wo contrast   Final Result by Lyly Frederick MD (06/02 8015)      Reidentified postoperative changes of prior total colectomy with ileal pouch and anastomosis  There are a few prominent, borderline dilated loops of proximal small bowel  However, the administered oral contrast extends to the mid to distal small bowel    which are of normal caliber  There is no obvious high-grade obstruction  A repeat delayed CT abdomen/pelvis in 4 hours could be performed to assess for further transit of the administered oral contrast       A few subtle groundglass densities are noted at both visualized lung bases suspicious for an infectious or inflammatory process  No free air or free fluid  Stable mild pelvic lymphadenopathy        Workstation performed: [id number]         XR abdomen obstruction series   Final Result by Andre Schaumann, MD (06/02 1005)      Persistent distended small bowel loops, status post total colectomy and J-pouch ileoanal anastomosis  This is better assessed in subsequent CT abdomen/pelvis  Workstation performed: [id number]                    Procedures  Procedures         ED Course                               SBIRT 22yo+    Flowsheet Row Most Recent Value   SBIRT (25 yo +)    In order to provide better care to our patients, we are screening all of our patients for alcohol and drug use  Would it be okay to ask you these screening questions? No Filed at: 06/02/2022 0038                    [facility]  Number of Diagnoses or Management Options  Abdominal pain  Diagnosis management comments: CT abdomen/pelvis pending care transitioned to dr Erika Beauchamp  Amount and/or Complexity of Data Reviewed  Clinical lab tests: ordered and reviewed  Tests in the radiology section of CPT®: ordered  Tests in the medicine section of CPT®: ordered and reviewed    Patient Progress  Patient progress: stable      Disposition  Final diagnoses:   Abdominal pain     Time reflects when diagnosis was documented in both MDM as applicable and the Disposition within this note     Time User Action Codes Description Comment    6/2/2022  9:01 AM Mook Parent Add [Z18 76] Annual physical exam     6/2/2022  9:01 AM Mook Parent Remove [O26 10] Annual physical exam     6/2/2022  9:01 AM Mook Parent Add [R10 9] Abdominal pain       ED Disposition     ED Disposition   Discharge    Condition   Stable    Date/Time   Thu Jun 2, 2022  9:01 AM    [address] discharge to home/self care                 Follow-up Information     Follow up With Specialties Details Why Contact Info    Your colorectal surgeon in [facility]  Schedule an appointment as soon as possible for a visit       Lauren Gallego, [address] In 2 days  1737 [facility]  [phone]            Discharge Medication List as of 6/2/2022  9:06 AM      START taking these medications    Details   morphine (MSIR) 15 mg tablet Take 1 tablet (15 mg total) by mouth every 4 (four) hours as needed for severe pain for up to 10 days Max Daily Amount: 90 mg, Starting Thu 6/2/2022, Until Sun 6/12/2022 at 2359, Normal         CONTINUE these medications which have NOT CHANGED    Details   DULoxetine (CYMBALTA) 60 mg delayed release capsule Take 1 capsule (60 mg total) by mouth daily, Starting Tue 3/22/2022, Normal      levofloxacin (LEVAQUIN) 500 mg tablet Take 1 tablet (500 mg total) by mouth daily for 5 days, Starting Wed 6/1/2022, Until Mon 6/6/2022, Normal      metroNIDAZOLE (FLAGYL) 500 mg tablet Take 1 tablet (500 mg total) by mouth every 8 (eight) hours for 5 days, Starting Wed 6/1/2022, Until Mon 6/6/2022, Normal      sulfaSALAzine (AZULFIDINE) 500 mg tablet Take 2 tablets (1,000 mg total) by mouth 2 (two) times a day, Starting Wed 6/1/2022, No Print      vancomycin (VANCOCIN) 125 MG capsule Take 1 capsule (125 mg total) by mouth every 12 (twelve) hours for 15 doses, Starting Wed 6/1/2022, Until Thu 6/9/2022, Normal             No discharge procedures on file      PDMP Review       Value Time User    PDMP Reviewed  Yes 3/3/2022  2:02  [facility]          ED Provider  Electronically Signed by           Cash Short, DO  06/03/22 2148       Cassie King, DO  06/03/22 2149

## 2022-06-02 NOTE — ED NOTES
Patient transported to x-ray; medications ordered to be given for when he returns to unit        [facility]  06/02/22 5024

## 2022-06-02 NOTE — ED CARE HANDOFF
Emergency Department Sign Out Note        Sign out and transfer of care from [address] [place] [address]  See Separate Emergency Department note  The patient, Rob Roth, was evaluated by the previous provider for abd pain      Workup Completed:  Results Reviewed     Procedure Component Value Units Date/Time    Comprehensive metabolic panel [[id number]]  (Abnormal) Collected: 06/02/22 0026    Lab Status: Final result Specimen: Blood from Arm, Left Updated: 06/02/22 0054     Sodium 139 mmol/L      Potassium 3 5 mmol/L      Chloride 101 mmol/L      CO2 30 mmol/L      ANION GAP 8 mmol/L      BUN 7 mg/dL      Creatinine 1 35 mg/dL      Glucose 109 mg/dL      Calcium 9 2 mg/dL      AST 14 U/L      ALT 30 U/L      Alkaline Phosphatase 87 U/L      Total Protein 7 7 g/dL      Albumin 3 8 g/dL      Total Bilirubin 0 32 mg/dL      eGFR 70 ml/min/1 73sq m     Narrative:      [place] guidelines for Chronic Kidney Disease (CKD):     Stage 1 with normal or high GFR (GFR > 90 mL/min/1 73 square meters)    Stage 2 Mild CKD (GFR = 60-89 mL/min/1 73 square meters)    Stage 3A Moderate CKD (GFR = 45-59 mL/min/1 73 square meters)    Stage 3B Moderate CKD (GFR = 30-44 mL/min/1 73 square meters)    Stage 4 Severe CKD (GFR = 15-29 mL/min/1 73 square meters)    Stage 5 End Stage CKD (GFR <15 mL/min/1 73 square meters)  Note: GFR calculation is accurate only with a steady state creatinine    Magnesium [[id number]]  (Normal) Collected: 06/02/22 0026    Lab Status: Final result Specimen: Blood from Arm, Left Updated: 06/02/22 0054     Magnesium 2 0 mg/dL     Lipase [[id number]]  (Normal) Collected: 06/02/22 0026    Lab Status: Final result Specimen: Blood from Arm, Left Updated: 06/02/22 0054     Lipase 94 u/L     Lactic acid [[id number]]  (Normal) Collected: 06/02/22 0026    Lab Status: Final result Specimen: Blood from Arm, Left Updated: 06/02/22 0053     LACTIC ACID 1 9 mmol/L     Narrative:      Result may be elevated if tourniquet was used during collection  CBC and differential [[id number]]  (Abnormal) Collected: 06/02/22 0026    Lab Status: Final result Specimen: Blood from Arm, Left Updated: 06/02/22 0032     WBC 9 85 Thousand/uL      RBC 5 98 Million/uL      Hemoglobin 11 3 g/dL      Hematocrit 38 2 %      MCV 64 fL      MCH 18 9 pg      MCHC 29 6 g/dL      RDW 17 9 %      MPV 8 5 fL      Platelets 924 Thousands/uL      nRBC 0 /100 WBCs      Neutrophils Relative 82 %      Immat GRANS % 0 %      Lymphocytes Relative 9 %      Monocytes Relative 7 %      Eosinophils Relative 1 %      Basophils Relative 1 %      Neutrophils Absolute 8 04 Thousands/µL      Immature Grans Absolute 0 03 Thousand/uL      Lymphocytes Absolute 0 89 Thousands/µL      Monocytes Absolute 0 67 Thousand/µL      Eosinophils Absolute 0 12 Thousand/µL      Basophils Absolute 0 10 Thousands/µL         CT abdomen pelvis wo contrast   Final Result by Carol Serna MD (06/02 3299)      Enteric contrast administered for examination performed approximately 4 hours earlier has now reached the ileal pouch indicating that there is no bowel obstruction  Reidentification of abnormally enlarged right pelvic node, unchanged in size from recent prior examinations  Subtle patchy groundglass airspace opacities in the lower lung zones suspicious for infectious/inflammatory process  Workstation performed: [id number]         CT abdomen pelvis wo contrast   Final Result by Wyatt Pham MD (06/02 2103)      Reidentified postoperative changes of prior total colectomy with ileal pouch and anastomosis  There are a few prominent, borderline dilated loops of proximal small bowel  However, the administered oral contrast extends to the mid to distal small bowel    which are of normal caliber  There is no obvious high-grade obstruction    A repeat delayed CT abdomen/pelvis in 4 hours could be performed to assess for further transit of the administered oral contrast       A few subtle groundglass densities are noted at both visualized lung bases suspicious for an infectious or inflammatory process  No free air or free fluid  Stable mild pelvic lymphadenopathy  Workstation performed: [id number]         XR abdomen obstruction series   Final Result by Arlene Shultz MD (06/02 1005)      Persistent distended small bowel loops, status post total colectomy and J-pouch ileoanal anastomosis  This is better assessed in subsequent CT abdomen/pelvis  Workstation performed: [id number]               ED Course / Workup Pending (followup):      continued pain, req ketamine  CT w/ possible small obstruction, radiology recommends repeat 4 hr scan  I discuss with patient as he's had many  Offer d/c and return precautions  Given severity we decide to pursue scan  Procedures  MDM        Disposition  Final diagnoses:   Abdominal pain     Time reflects when diagnosis was documented in both MDM as applicable and the Disposition within this note     Time User Action Codes Description Comment    6/2/2022  9:01 AM Jessica Heady Add [E93 59] Annual physical exam     6/2/2022  9:01 AM Jessica Heady Remove [D33 00] Annual physical exam     6/2/2022  9:01 AM Jessica Heady Add [R10 9] Abdominal pain       ED Disposition     ED Disposition   Discharge    Condition   Stable    Date/Time   u Jun 2, 2022  9:01 AM    Comment   Maribell Lev discharge to home/self care                 Follow-up Information     Follow up With Specialties Details Why Contact Info    Your colorectal surgeon in [facility]  Schedule an appointment as soon as possible for a visit       Colleen Gaitan, [address] In 2 days  [facility]  [phone]          Discharge Medication List as of 6/2/2022  9:06 AM      START taking these medications    Details   morphine (MSIR) 15 mg tablet Take 1 tablet (15 mg total) by mouth every 4 (four) hours as needed for severe pain for up to 10 days Max Daily Amount: 90 mg, Starting Thu 6/2/2022, Until Sun 6/12/2022 at 2359, Normal         CONTINUE these medications which have NOT CHANGED    Details   DULoxetine (CYMBALTA) 60 mg delayed release capsule Take 1 capsule (60 mg total) by mouth daily, Starting Tue 3/22/2022, Normal      levofloxacin (LEVAQUIN) 500 mg tablet Take 1 tablet (500 mg total) by mouth daily for 5 days, Starting Wed 6/1/2022, Until Mon 6/6/2022, Normal      metroNIDAZOLE (FLAGYL) 500 mg tablet Take 1 tablet (500 mg total) by mouth every 8 (eight) hours for 5 days, Starting Wed 6/1/2022, Until Mon 6/6/2022, Normal      sulfaSALAzine (AZULFIDINE) 500 mg tablet Take 2 tablets (1,000 mg total) by mouth 2 (two) times a day, Starting Wed 6/1/2022, No Print      vancomycin (VANCOCIN) 125 MG capsule Take 1 capsule (125 mg total) by mouth every 12 (twelve) hours for 15 doses, Starting Wed 6/1/2022, Until Thu 6/9/2022, Normal           No discharge procedures on file         ED Provider  Electronically Signed by     Shady Antonio MD  06/03/22 6683

## 2022-06-03 NOTE — TELEPHONE ENCOUNTER
----- Message from Martha Browning DO sent at 6/1/2022 11:52 PM EDT -----  Regarding: RE: Discharge  Thank you for allowing us to participate in the care of your patient, Kirstie Newell, who was hospitalized from 5/30/2022 through 6/1/2022 with the admitting diagnosis of pouchitis, rectal obstruction on imaging  Underwent pouchoscopy by GI and tx with abx  Abd pain improved and pt advised to f/u with his docs at [facility]      If you have any additional questions or would like to discuss further, please feel free to contact me      Liu Arellano [facility] Internal Medicine, Hospitalist  [phone]

## 2022-06-03 NOTE — UTILIZATION REVIEW
Notification of Discharge   This is a Notification of Discharge from our facility [address]  Please be advised that this patient has been discharge from our facility  Below you will find the admission and discharge date and time including the patients disposition  UTILIZATION REVIEW CONTACT:  José Miguel Sarabia  Utilization   Network Utilization Review Department  Phone: [phone] x carefully listen to the prompts  All voicemails are confidential   Email: [email or web address]     PHYSICIAN ADVISORY SERVICES:  FOR [id number] REVIEW - MEDICAL NECESSITY DENIAL  Phone: [phone]  Fax: [phone]  Email: [email or web address]     PRESENTATION DATE: 5/30/2022  2:24 AM  OBERVATION ADMISSION DATE:   INPATIENT ADMISSION DATE: 5/30/22  4:46 AM   DISCHARGE DATE: 6/1/2022  7:40 PM  DISPOSITION: Home/Self Care Home/Self Care      IMPORTANT INFORMATION:  Send all requests for admission clinical reviews, approved or denied determinations and any other requests to dedicated fax number below belonging to the campus where the patient is receiving treatment   List of dedicated fax numbers:  [address] DENIALS (Administrative/Medical Necessity) [phone]   1000  [facility] (Maternity/NICU/Pediatrics) [phone]   [place] [phone]   [address] [facility] [phone]   [address] [place] [address] [phone]   [address] [place] [address],[address] [phone]   [facility]  [phone]   [address] [place] [address], [address]  [address] [facility] [address] [facility] [phone]

## 2022-06-04 ENCOUNTER — APPOINTMENT (EMERGENCY)
Dept: RADIOLOGY | Facility: HOSPITAL | Age: 27
End: 2022-06-04
Payer: COMMERCIAL

## 2022-06-04 ENCOUNTER — HOSPITAL ENCOUNTER (EMERGENCY)
Facility: HOSPITAL | Age: 27
Discharge: HOME | End: 2022-06-04
Attending: EMERGENCY MEDICINE
Payer: COMMERCIAL

## 2022-06-04 VITALS
RESPIRATION RATE: 21 BRPM | HEART RATE: 100 BPM | OXYGEN SATURATION: 97 % | SYSTOLIC BLOOD PRESSURE: 158 MMHG | DIASTOLIC BLOOD PRESSURE: 81 MMHG | TEMPERATURE: 98.1 F

## 2022-06-04 DIAGNOSIS — R10.9 ABDOMINAL PAIN: Primary | ICD-10-CM

## 2022-06-04 DIAGNOSIS — K51.90 ULCERATIVE COLITIS (HCC): ICD-10-CM

## 2022-06-04 LAB
ALBUMIN SERPL BCP-MCNC: 3.8 G/DL (ref 3.5–5)
ALP SERPL-CCNC: 80 U/L (ref 46–116)
ALT SERPL W P-5'-P-CCNC: 28 U/L (ref 12–78)
ANION GAP SERPL CALCULATED.3IONS-SCNC: 11 MMOL/L (ref 4–13)
AST SERPL W P-5'-P-CCNC: 14 U/L (ref 5–45)
BASOPHILS # BLD AUTO: 0.1 THOUSANDS/ΜL (ref 0–0.1)
BASOPHILS NFR BLD AUTO: 1 % (ref 0–1)
BILIRUB SERPL-MCNC: 0.41 MG/DL (ref 0.2–1)
BUN SERPL-MCNC: 11 MG/DL (ref 5–25)
CALCIUM SERPL-MCNC: 9.4 MG/DL (ref 8.3–10.1)
CHLORIDE SERPL-SCNC: 104 MMOL/L (ref 100–108)
CO2 SERPL-SCNC: 27 MMOL/L (ref 21–32)
CREAT SERPL-MCNC: 1.13 MG/DL (ref 0.6–1.3)
CRP SERPL QL: 28.4 MG/L
EOSINOPHIL # BLD AUTO: 0.21 THOUSAND/ΜL (ref 0–0.61)
EOSINOPHIL NFR BLD AUTO: 2 % (ref 0–6)
ERYTHROCYTE [DISTWIDTH] IN BLOOD BY AUTOMATED COUNT: 17.9 % (ref 11.6–15.1)
ERYTHROCYTE [SEDIMENTATION RATE] IN BLOOD: 37 MM/HOUR (ref 0–14)
GFR SERPL CREATININE-BSD FRML MDRD: 87 ML/MIN/1.73SQ M
GLUCOSE SERPL-MCNC: 98 MG/DL (ref 65–140)
HCT VFR BLD AUTO: 39.4 % (ref 36.5–49.3)
HGB BLD-MCNC: 11.9 G/DL (ref 12–17)
IMM GRANULOCYTES # BLD AUTO: 0.03 THOUSAND/UL (ref 0–0.2)
IMM GRANULOCYTES NFR BLD AUTO: 0 % (ref 0–2)
LIPASE SERPL-CCNC: 122 U/L (ref 73–393)
LYMPHOCYTES # BLD AUTO: 1.68 THOUSANDS/ΜL (ref 0.6–4.47)
LYMPHOCYTES NFR BLD AUTO: 17 % (ref 14–44)
MCH RBC QN AUTO: 19.1 PG (ref 26.8–34.3)
MCHC RBC AUTO-ENTMCNC: 30.2 G/DL (ref 31.4–37.4)
MCV RBC AUTO: 63 FL (ref 82–98)
MONOCYTES # BLD AUTO: 1.06 THOUSAND/ΜL (ref 0.17–1.22)
MONOCYTES NFR BLD AUTO: 11 % (ref 4–12)
NEUTROPHILS # BLD AUTO: 6.83 THOUSANDS/ΜL (ref 1.85–7.62)
NEUTS SEG NFR BLD AUTO: 69 % (ref 43–75)
NRBC BLD AUTO-RTO: 0 /100 WBCS
PLATELET # BLD AUTO: 521 THOUSANDS/UL (ref 149–390)
PMV BLD AUTO: 8.8 FL (ref 8.9–12.7)
POTASSIUM SERPL-SCNC: 3.6 MMOL/L (ref 3.5–5.3)
PROT SERPL-MCNC: 7.8 G/DL (ref 6.4–8.2)
RBC # BLD AUTO: 6.24 MILLION/UL (ref 3.88–5.62)
SODIUM SERPL-SCNC: 142 MMOL/L (ref 136–145)
WBC # BLD AUTO: 9.91 THOUSAND/UL (ref 4.31–10.16)

## 2022-06-04 PROCEDURE — 96375 TX/PRO/DX INJ NEW DRUG ADDON: CPT

## 2022-06-04 PROCEDURE — 85652 RBC SED RATE AUTOMATED: CPT | Performed by: EMERGENCY MEDICINE

## 2022-06-04 PROCEDURE — 80053 COMPREHEN METABOLIC PANEL: CPT

## 2022-06-04 PROCEDURE — 96376 TX/PRO/DX INJ SAME DRUG ADON: CPT

## 2022-06-04 PROCEDURE — 83690 ASSAY OF LIPASE: CPT

## 2022-06-04 PROCEDURE — 96361 HYDRATE IV INFUSION ADD-ON: CPT

## 2022-06-04 PROCEDURE — 96374 THER/PROPH/DIAG INJ IV PUSH: CPT

## 2022-06-04 PROCEDURE — 36415 COLL VENOUS BLD VENIPUNCTURE: CPT

## 2022-06-04 PROCEDURE — 99285 EMERGENCY DEPT VISIT HI MDM: CPT

## 2022-06-04 PROCEDURE — G1004 CDSM NDSC: HCPCS

## 2022-06-04 PROCEDURE — 85025 COMPLETE CBC W/AUTO DIFF WBC: CPT

## 2022-06-04 PROCEDURE — 86140 C-REACTIVE PROTEIN: CPT | Performed by: EMERGENCY MEDICINE

## 2022-06-04 PROCEDURE — 74018 RADEX ABDOMEN 1 VIEW: CPT

## 2022-06-04 PROCEDURE — 74176 CT ABD & PELVIS W/O CONTRAST: CPT

## 2022-06-04 PROCEDURE — 99285 EMERGENCY DEPT VISIT HI MDM: CPT | Performed by: EMERGENCY MEDICINE

## 2022-06-04 RX ORDER — METOCLOPRAMIDE HYDROCHLORIDE 5 MG/ML
10 INJECTION INTRAMUSCULAR; INTRAVENOUS ONCE
Status: COMPLETED | OUTPATIENT
Start: 2022-06-04 | End: 2022-06-04

## 2022-06-04 RX ORDER — KETAMINE HCL IN NACL, ISO-OSM 100MG/10ML
0.3 SYRINGE (ML) INJECTION ONCE
Status: DISCONTINUED | OUTPATIENT
Start: 2022-06-04 | End: 2022-06-04

## 2022-06-04 RX ORDER — PREDNISONE 50 MG/1
50 TABLET ORAL DAILY
Qty: 5 TABLET | Refills: 0 | Status: SHIPPED | OUTPATIENT
Start: 2022-06-04 | End: 2022-06-07

## 2022-06-04 RX ORDER — MORPHINE SULFATE 10 MG/ML
8 INJECTION, SOLUTION INTRAMUSCULAR; INTRAVENOUS ONCE
Status: COMPLETED | OUTPATIENT
Start: 2022-06-04 | End: 2022-06-04

## 2022-06-04 RX ORDER — KETOROLAC TROMETHAMINE 30 MG/ML
15 INJECTION, SOLUTION INTRAMUSCULAR; INTRAVENOUS ONCE
Status: COMPLETED | OUTPATIENT
Start: 2022-06-04 | End: 2022-06-04

## 2022-06-04 RX ORDER — ONDANSETRON 4 MG/1
4 TABLET, ORALLY DISINTEGRATING ORAL ONCE
Status: DISCONTINUED | OUTPATIENT
Start: 2022-06-04 | End: 2022-06-04

## 2022-06-04 RX ORDER — MORPHINE SULFATE 10 MG/ML
6 INJECTION, SOLUTION INTRAMUSCULAR; INTRAVENOUS ONCE
Status: COMPLETED | OUTPATIENT
Start: 2022-06-04 | End: 2022-06-04

## 2022-06-04 RX ORDER — ONDANSETRON 2 MG/ML
4 INJECTION INTRAMUSCULAR; INTRAVENOUS ONCE
Status: COMPLETED | OUTPATIENT
Start: 2022-06-04 | End: 2022-06-04

## 2022-06-04 RX ORDER — METHYLPREDNISOLONE SODIUM SUCCINATE 125 MG/2ML
80 INJECTION, POWDER, LYOPHILIZED, FOR SOLUTION INTRAMUSCULAR; INTRAVENOUS ONCE
Status: COMPLETED | OUTPATIENT
Start: 2022-06-04 | End: 2022-06-04

## 2022-06-04 RX ADMIN — SODIUM CHLORIDE 1000 ML: 0.9 INJECTION, SOLUTION INTRAVENOUS at 04:54

## 2022-06-04 RX ADMIN — KETOROLAC TROMETHAMINE 15 MG: 30 INJECTION, SOLUTION INTRAMUSCULAR at 04:56

## 2022-06-04 RX ADMIN — MORPHINE SULFATE 8 MG: 10 INJECTION INTRAVENOUS at 06:21

## 2022-06-04 RX ADMIN — ONDANSETRON 4 MG: 2 INJECTION INTRAMUSCULAR; INTRAVENOUS at 04:56

## 2022-06-04 RX ADMIN — MORPHINE SULFATE 6 MG: 10 INJECTION INTRAVENOUS at 08:39

## 2022-06-04 RX ADMIN — METHYLPREDNISOLONE SODIUM SUCCINATE 80 MG: 125 INJECTION, POWDER, FOR SOLUTION INTRAMUSCULAR; INTRAVENOUS at 05:38

## 2022-06-04 RX ADMIN — METOCLOPRAMIDE HYDROCHLORIDE 10 MG: 5 INJECTION INTRAMUSCULAR; INTRAVENOUS at 05:38

## 2022-06-04 NOTE — ED PROVIDER NOTES
Final Diagnosis:  1  Abdominal pain    2  Ulcerative colitis [facility])        Chief Complaint   Patient presents with    Abdominal Pain     Pt stating he feels like he is obstructing again,trying to get ahead of it  Pt feeling burning sensation, last bm 2200 6/3     HPI  78-year-old presents emergency room concerned because he has had a bowel movement since couple days ago when he was here  He also notes that he is having worsening abdominal pain though he is at the earlier side of a flare currently  For example during last visit he required multiple narcotics and eventually ketamine however today he declines ketamine as the pain is on the lower side of the scale  He is concerned thought maybe he could get ahead of it  Complicated history including ulcerative colitis status post colectomy with ileal pouch 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  All followed up for GI and surgery in [place] [address]       - No language barrier    - History obtained from patient  - There are no limitations to the history obtained  - Previous charting underwent limited review with attention to last ED visits, labs, ekgs, and prior imaging  PMH:   has a past medical history of Ankylosing spondylitis ([address] ), Anxiety, Bowel obstruction ([address] ), Clostridium difficile colitis (9/13/2018), Colitis, 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[address] ) (9/13/2018), Pancreatitis, and Ulcerative colitis ([address] )  PSH:   has a past surgical history that includes Total colectomy; COLECTOMY TOTAL; and IR PICC placement single lumen (3/1/2022)  Social History:    Tobacco Use: Low Risk     Smoking Tobacco Use: Never Smoker    Smokeless Tobacco Use: Never Used     Alcohol Use: Not on file     Patient with no illicit use       ROS:    Pertinent positives/negatives:   Review of Systems     CONSTITUTIONAL:  No dizziness  No weakness  No unexpected weight loss  EYES:  No pain, erythema, or discharge  No loss of vision    ENT:  No tinnitus, decreased hearing  No epistaxis/purulent drainage  No voice change, airway closing, trismus  CARDIOVASCULAR:  No chest pain  No palpitations  No new lower extremity edema  RESPIRATORY:  No purulent cough  No hemoptysis  No dyspnea  No paroxysmal nocturnal dyspnea  No stridor, audible wheezing bedside  GASTROINTESTINAL:  Normal appetite  No vomiting, diarrhea  No pain  No bloating  No melena  GENITOURINARY:  No frequency, urgency, nocturia  No hematuria or dysuria  No discharge  No sores/adenopathy  MUSCULOSKELETAL:  No arthralgias or myalgias that are new  INTEGUMENTARY:  No swelling  No unexpected contusions  No abrasions  No lymphangitis  NEUROLOGIC:  No meningismus  No numbness of the extremities  No new focal weakness  No postural instability  PSYCHIATRIC:  No SI HI AVH  HEMATOLOGICAL:  No bleeding  No petechiae  No bruising  ALLERGIES:  No urticaria  No sudden abd cramping  No stridor  PE:     Physical exam highlights:   Physical Exam       Vitals:    06/04/22 0430 06/04/22 0630   BP: 139/86 158/81   BP Location: Left arm    Pulse: 100    Resp: 21    Temp: 98 1 °F (36 7 °C)    TempSrc: Oral    SpO2: 97%      Vitals reviewed by me  Nursing note reviewed  Chaperone present for all sensitive exam   Const: No acute distress  Alert  Nontoxic  Not diaphoretic  HEENT: External ears normal  No protrusion drainage swelling  Nose normal  No drainage/traumatic deformity  MMM  Mouth with baseline/symmetric movement  No trismus  Eyes: No squinting  No icterus  Tracks through the room with normal EOM  No tearing/swelling/drainage  Neck: ROM normal  No rigidity  No meningismus  Cards: Rate as per vitals  Compared to monitor sinus unless documented above  Regular  Well perfused  Pulm: able to verbalize without additional effort  Effort and excursion normal  No disress  No audible wheezing/ stridor  Normal resp rate  Abd: No distension beyond baseline  No fluctuant wave   Patient without peritoneal pain with shifting/bumping the bed  Surgical incisions prior  Tender lower abd  No flank  MSK: ROM normal and baseline  No deformity  Skin: No new rashes visible  Well perfused  Neuro: Nonfocal  Baseline  CN grossly intact  Moving all four with coordination  Psych: Normal behavior and affect  A:  - Nursing note reviewed  Ddx and MDM  Ileal pouchitis  IBD flare  SBO                   ED Course as of 06/04/22 2156   Sat Jun 04, 2022   0501 Spoke with radiologist, will give PO contrast, film in 3 hours, possible low dose renal CT thereafter try to avoid radiation     CT renal stone study abdomen pelvis wo contrast   Final Result   1  Limited examination due to lack of intravenous contrast material as well as segmental visualization of the small bowel  2   No evidence of small bowel obstruction, however the contrast material has not reached the distal small bowel, ileal pouch or ileoanal anastomosis  Oral contrast material in the ileal pouch and ileoanal anastomosis, secondary to retained oral    contrast material from earlier CT abdomen pelvis  The ileal pouch is incompletely distended with oral contrast material and air and inadequately evaluated  Given the recurrence of the patient's symptoms, recommend follow-up GI consultation and MR enterography to exclude abnormal loops of small bowel  If there is concern for ileal pouchitis, recommend follow-up GI consultation  Workstation performed: [id number]         XR abdomen 1 view kub   Final Result      Diluted contrast midline pelvis in the area of reported ileal anal anastomosis        Dilated bowel loops greatest upper abdomen; 11 cm      Workstation performed: [id number]           Orders Placed This Encounter   Procedures    CT renal stone study abdomen pelvis wo contrast    XR abdomen 1 view kub    CBC and differential    Comprehensive metabolic panel    Lipase    Sedimentation rate, automated    C-reactive protein     Labs Reviewed   CBC AND DIFFERENTIAL - Abnormal       Result Value Ref Range Status    WBC 9 91  4 31 - 10 16 Thousand/uL Final    RBC 6 24 (*) 3 88 - 5 62 Million/uL Final    Hemoglobin 11 9 (*) 12 0 - 17 0 g/dL Final    Hematocrit 39 4  36 5 - 49 3 % Final    MCV 63 (*) 82 - 98 fL Final    MCH 19 1 (*) 26 8 - 34 3 pg Final    MCHC 30 2 (*) 31 4 - 37 4 g/dL Final    RDW 17 9 (*) 11 6 - 15 1 % Final    MPV 8 8 (*) 8 9 - 12 7 fL Final    Platelets 571 (*) 495 - 390 Thousands/uL Final    nRBC 0  /100 WBCs Final    Neutrophils Relative 69  43 - 75 % Final    Immat GRANS % 0  0 - 2 % Final    Lymphocytes Relative 17  14 - 44 % Final    Monocytes Relative 11  4 - 12 % Final    Eosinophils Relative 2  0 - 6 % Final    Basophils Relative 1  0 - 1 % Final    Neutrophils Absolute 6 83  1 85 - 7 62 Thousands/µL Final    Immature Grans Absolute 0 03  0 00 - 0 20 Thousand/uL Final    Lymphocytes Absolute 1 68  0 60 - 4 47 Thousands/µL Final    Monocytes Absolute 1 06  0 17 - 1 22 Thousand/µL Final    Eosinophils Absolute 0 21  0 00 - 0 61 Thousand/µL Final    Basophils Absolute 0 10  0 00 - 0 10 Thousands/µL Final   SEDIMENTATION RATE - Abnormal    Sed Rate 37 (*) 0 - 14 mm/hour Final   C-REACTIVE PROTEIN - Abnormal    CRP 28 4 (*) <3 0 mg/L Final   LIPASE - Normal    Lipase 122  73 - 393 u/L Final   COMPREHENSIVE METABOLIC PANEL    Sodium 801  136 - 145 mmol/L Final    Potassium 3 6  3 5 - 5 3 mmol/L Final    Chloride 104  100 - 108 mmol/L Final    CO2 27  21 - 32 mmol/L Final    ANION GAP 11  4 - 13 mmol/L Final    BUN 11  5 - 25 mg/dL Final    Comment: Verified by repeat analysis    Creatinine 1 13  0 60 - 1 30 mg/dL Final    Comment: Standardized to IDMS reference method    Glucose 98  65 - 140 mg/dL Final    Comment: If the patient is fasting, the ADA then defines impaired fasting glucose as > 100 mg/dL and diabetes as > or equal to 123 mg/dL    Specimen collection should occur prior to Sulfasalazine administration due to the potential for falsely depressed results  Specimen collection should occur prior to Sulfapyridine administration due to the potential for falsely elevated results  Calcium 9 4  8 3 - 10 1 mg/dL Final    AST 14  5 - 45 U/L Final    Comment: Specimen collection should occur prior to Sulfasalazine administration due to the potential for falsely depressed results  ALT 28  12 - 78 U/L Final    Comment: Specimen collection should occur prior to Sulfasalazine administration due to the potential for falsely depressed results  Alkaline Phosphatase 80  46 - 116 U/L Final    Total Protein 7 8  6 4 - 8 2 g/dL Final    Albumin 3 8  3 5 - 5 0 g/dL Final    Total Bilirubin 0 41  0 20 - 1 00 mg/dL Final    Comment: Use of this assay is not recommended for patients undergoing treatment with eltrombopag due to the potential for falsely elevated results  eGFR 87  ml/min/1 73sq m Final    Narrative:     [place] guidelines for Chronic Kidney Disease (CKD):     Stage 1 with normal or high GFR (GFR > 90 mL/min/1 73 square meters)    Stage 2 Mild CKD (GFR = 60-89 mL/min/1 73 square meters)    Stage 3A Moderate CKD (GFR = 45-59 mL/min/1 73 square meters)    Stage 3B Moderate CKD (GFR = 30-44 mL/min/1 73 square meters)    Stage 4 Severe CKD (GFR = 15-29 mL/min/1 73 square meters)    Stage 5 End Stage CKD (GFR <15 mL/min/1 73 square meters)  Note: GFR calculation is accurate only with a steady state creatinine       Final Diagnosis:  1  Abdominal pain    2   Ulcerative colitis ([facility] )        P:  - hospital tx includes   Medications   sodium chloride 0 9 % bolus 1,000 mL (0 mL Intravenous Stopped 6/4/22 1022)   ketorolac (TORADOL) injection 15 mg (15 mg Intravenous Given 6/4/22 9596)   ondansetron (ZOFRAN) injection 4 mg (4 mg Intravenous Given 6/4/22 9196)   methylPREDNISolone sodium succinate (Solu-MEDROL) injection 80 mg (80 mg Intravenous Given 6/4/22 6852) metoclopramide (REGLAN) injection 10 mg (10 mg Intravenous Given 6/4/22 0538)   morphine 10 mg/mL injection 8 mg (8 mg Intravenous Given 6/4/22 0621)   morphine 10 mg/mL injection 6 mg (6 mg Intravenous Given 6/4/22 0839)         - disposition  Time reflects when diagnosis was documented in both MDM as applicable and the Disposition within this note     Time User Action Codes Description Comment    6/4/2022 10:18 AM Pearletha Laming Add [R10 9] Abdominal pain     6/4/2022 10:19 AM Pearletha Laming Add [K51 90] Ulcerative colitis [facility])       ED Disposition     ED Disposition   Discharge    Condition   Stable    Date/Time   Sat Jun 4, 2022 10:19 AM    Comment   Lotus Kamara discharge to home/self care  Follow-up Information     Follow up With Specialties Details Why Contact Info    Your GI team  Schedule an appointment as soon as possible for a visit             - patient will call their PCP to let them know they were in the emergency department   We discuss return precautions       - additional tx intended, if consistent with primary provider:  - patient to follow with :      Discharge Medication List as of 6/4/2022 10:19 AM      START taking these medications    Details   predniSONE 50 mg tablet Take 1 tablet (50 mg total) by mouth daily for 5 days, Starting Sat 6/4/2022, Until Thu 6/9/2022, Normal         CONTINUE these medications which have NOT CHANGED    Details   DULoxetine (CYMBALTA) 60 mg delayed release capsule Take 1 capsule (60 mg total) by mouth daily, Starting Tue 3/22/2022, Normal      levofloxacin (LEVAQUIN) 500 mg tablet Take 1 tablet (500 mg total) by mouth daily for 5 days, Starting Wed 6/1/2022, Until Mon 6/6/2022, Normal      metroNIDAZOLE (FLAGYL) 500 mg tablet Take 1 tablet (500 mg total) by mouth every 8 (eight) hours for 5 days, Starting Wed 6/1/2022, Until Mon 6/6/2022, Normal      morphine (MSIR) 15 mg tablet Take 1 tablet (15 mg total) by mouth every 4 (four) hours as needed for severe pain for up to 10 days Max Daily Amount: 90 mg, Starting Thu 6/2/2022, Until Sun 6/12/2022 at 2359, Normal      sulfaSALAzine (AZULFIDINE) 500 mg tablet Take 2 tablets (1,000 mg total) by mouth 2 (two) times a day, Starting Wed 6/1/2022, No Print      vancomycin (VANCOCIN) 125 MG capsule Take 1 capsule (125 mg total) by mouth every 12 (twelve) hours for 15 doses, Starting Wed 6/1/2022, Until u 6/9/2022, Normal           No discharge procedures on file  Prior to Admission Medications   Prescriptions Last Dose Informant Patient Reported? Taking? DULoxetine (CYMBALTA) 60 mg delayed release capsule   No No   Sig: Take 1 capsule (60 mg total) by mouth daily   levofloxacin (LEVAQUIN) 500 mg tablet   No No   Sig: Take 1 tablet (500 mg total) by mouth daily for 5 days   metroNIDAZOLE (FLAGYL) 500 mg tablet   No No   Sig: Take 1 tablet (500 mg total) by mouth every 8 (eight) hours for 5 days   morphine (MSIR) 15 mg tablet   No No   Sig: Take 1 tablet (15 mg total) by mouth every 4 (four) hours as needed for severe pain for up to 10 days Max Daily Amount: 90 mg   sulfaSALAzine (AZULFIDINE) 500 mg tablet   No No   Sig: Take 2 tablets (1,000 mg total) by mouth 2 (two) times a day   vancomycin (VANCOCIN) 125 MG capsule   No No   Sig: Take 1 capsule (125 mg total) by mouth every 12 (twelve) hours for 15 doses      Facility-Administered Medications: None       Portions of the record may have been created with voice recognition software  Occasional wrong word or "sound a like" substitutions may have occurred due to the inherent limitations of voice recognition software  Read the chart carefully and recognize, using context, where substitutions have occurred      Electronically signed by:  MD Ely Denton MD  06/04/22 2618

## 2022-06-04 NOTE — ED CARE HANDOFF
Emergency Department Sign Out Note        Sign out and transfer of care from previous provider  See Separate Emergency Department note  The patient, Ashanti Farnsworth, was evaluated by the previous provider for abdominal pain  Workup Completed:  Blood    ED Course / Workup Pending (followup):  KUB and CT abdomen/pelvis                                  ED Course as of 06/04/22 1023   Sat Jun 04, 2022   0701 Patient pending CT renal study to be completed at 8:00 a m  And the patient will be reassessed and dispositioned appropriately  If patient is discharged home pressure in will be given prednisone burst for possible flare of his ulcerative colitis  1019 Patient examined bedside  Patient was able to have a bowel movement at this time feels much better  Procedures  MDM  Number of Diagnoses or Management Options  Risk of Complications, Morbidity, and/or Mortality  General comments: Patient's CT scan today did not show any signs of obstruction  Patient's lab work did show elevated CRP suggesting his pain may be secondary to ulcerative colitis flare  Patient felt better after having a bowel movement in the ED  At this time patient is placed on prednisone burst and discharged with follow-up to his GI team in [place] as soon as possible  The patient has p o  Morphine at home p r n  Pain  Close return instructions given to return to the ER for any worsening symptoms  Patient agrees with discharge plan  Patient well appearing at time of discharge  Please Note: Fluency Direct voice recognition software may have been used in the creation of this document  Wrong words or sound a like substitutions may have occurred due to the inherent limitations of the voice software         Patient Progress  Patient progress: improved          Disposition  Final diagnoses:   Abdominal pain   Ulcerative colitis ([facility] [address] )     Time reflects when diagnosis was documented in both MDM as applicable and the Disposition within this note     Time User Action Codes Description Comment    6/4/2022 10:18 AM Manjit Arguello Add [R10 9] Abdominal pain     6/4/2022 10:19 AM Manjit Arguello Add [K51 90] Ulcerative colitis [facility])       ED Disposition     ED Disposition   Discharge    Condition   Stable    Date/Time   Sat Jun 4, 2022 10:19 AM    Comment   Raymundo Estuardosantos discharge to home/self care  Follow-up Information     Follow up With Specialties Details Why Contact Info    Your GI team  Schedule an appointment as soon as possible for a visit           Discharge Medication List as of 6/4/2022 10:19 AM      START taking these medications    Details   predniSONE 50 mg tablet Take 1 tablet (50 mg total) by mouth daily for 5 days, Starting Sat 6/4/2022, Until Thu 6/9/2022, Normal         CONTINUE these medications which have NOT CHANGED    Details   DULoxetine (CYMBALTA) 60 mg delayed release capsule Take 1 capsule (60 mg total) by mouth daily, Starting Tue 3/22/2022, Normal      levofloxacin (LEVAQUIN) 500 mg tablet Take 1 tablet (500 mg total) by mouth daily for 5 days, Starting Wed 6/1/2022, Until Mon 6/6/2022, Normal      metroNIDAZOLE (FLAGYL) 500 mg tablet Take 1 tablet (500 mg total) by mouth every 8 (eight) hours for 5 days, Starting Wed 6/1/2022, Until Mon 6/6/2022, Normal      morphine (MSIR) 15 mg tablet Take 1 tablet (15 mg total) by mouth every 4 (four) hours as needed for severe pain for up to 10 days Max Daily Amount: 90 mg, Starting Thu 6/2/2022, Until Sun 6/12/2022 at 2359, Normal      sulfaSALAzine (AZULFIDINE) 500 mg tablet Take 2 tablets (1,000 mg total) by mouth 2 (two) times a day, Starting Wed 6/1/2022, No Print      vancomycin (VANCOCIN) 125 MG capsule Take 1 capsule (125 mg total) by mouth every 12 (twelve) hours for 15 doses, Starting Wed 6/1/2022, Until Thu 6/9/2022, Normal           No discharge procedures on file         ED Provider  Electronically Signed by     Nicolas Farrell DO  06/04/22 [id number]

## 2022-06-05 ENCOUNTER — APPOINTMENT (EMERGENCY)
Dept: RADIOLOGY | Facility: HOSPITAL | Age: 27
End: 2022-06-05
Payer: COMMERCIAL

## 2022-06-05 ENCOUNTER — HOSPITAL ENCOUNTER (OUTPATIENT)
Facility: HOSPITAL | Age: 27
Setting detail: OBSERVATION
Discharge: HOME | End: 2022-06-07
Attending: EMERGENCY MEDICINE
Payer: COMMERCIAL

## 2022-06-05 DIAGNOSIS — R10.13 EPIGASTRIC PAIN: ICD-10-CM

## 2022-06-05 DIAGNOSIS — K52.9 COLITIS: ICD-10-CM

## 2022-06-05 DIAGNOSIS — R10.13 EPIGASTRIC ABDOMINAL PAIN: Primary | ICD-10-CM

## 2022-06-05 DIAGNOSIS — Z86.19 HISTORY OF CLOSTRIDIUM DIFFICILE INFECTION: ICD-10-CM

## 2022-06-05 DIAGNOSIS — K91.850 POUCHITIS (HCC): ICD-10-CM

## 2022-06-05 DIAGNOSIS — R10.84 GENERALIZED ABDOMINAL PAIN: ICD-10-CM

## 2022-06-05 LAB
2HR DELTA HS TROPONIN: 0 NG/L
ALBUMIN SERPL BCP-MCNC: 3.6 G/DL (ref 3.5–5)
ALP SERPL-CCNC: 77 U/L (ref 46–116)
ALT SERPL W P-5'-P-CCNC: 25 U/L (ref 12–78)
ANION GAP SERPL CALCULATED.3IONS-SCNC: 11 MMOL/L (ref 4–13)
APTT PPP: 28 SECONDS (ref 23–37)
AST SERPL W P-5'-P-CCNC: 25 U/L (ref 5–45)
BASOPHILS # BLD AUTO: 0.07 THOUSANDS/ΜL (ref 0–0.1)
BASOPHILS NFR BLD AUTO: 1 % (ref 0–1)
BILIRUB SERPL-MCNC: 0.36 MG/DL (ref 0.2–1)
BUN SERPL-MCNC: 13 MG/DL (ref 5–25)
CALCIUM SERPL-MCNC: 8.5 MG/DL (ref 8.3–10.1)
CARDIAC TROPONIN I PNL SERPL HS: 4 NG/L
CARDIAC TROPONIN I PNL SERPL HS: 4 NG/L
CHLORIDE SERPL-SCNC: 104 MMOL/L (ref 100–108)
CO2 SERPL-SCNC: 27 MMOL/L (ref 21–32)
CREAT SERPL-MCNC: 1.02 MG/DL (ref 0.6–1.3)
EOSINOPHIL # BLD AUTO: 0.09 THOUSAND/ΜL (ref 0–0.61)
EOSINOPHIL NFR BLD AUTO: 1 % (ref 0–6)
ERYTHROCYTE [DISTWIDTH] IN BLOOD BY AUTOMATED COUNT: 18.1 % (ref 11.6–15.1)
ERYTHROCYTE [DISTWIDTH] IN BLOOD BY AUTOMATED COUNT: 18.3 % (ref 11.6–15.1)
GFR SERPL CREATININE-BSD FRML MDRD: 98 ML/MIN/1.73SQ M
GLUCOSE SERPL-MCNC: 97 MG/DL (ref 65–140)
HCT VFR BLD AUTO: 38.2 % (ref 36.5–49.3)
HCT VFR BLD AUTO: 39.2 % (ref 36.5–49.3)
HGB BLD-MCNC: 11.4 G/DL (ref 12–17)
HGB BLD-MCNC: 11.4 G/DL (ref 12–17)
IMM GRANULOCYTES # BLD AUTO: 0.04 THOUSAND/UL (ref 0–0.2)
IMM GRANULOCYTES NFR BLD AUTO: 0 % (ref 0–2)
INR PPP: 0.99 (ref 0.84–1.19)
LACTATE SERPL-SCNC: 1 MMOL/L (ref 0.5–2)
LIPASE SERPL-CCNC: 58 U/L (ref 73–393)
LYMPHOCYTES # BLD AUTO: 2.05 THOUSANDS/ΜL (ref 0.6–4.47)
LYMPHOCYTES NFR BLD AUTO: 14 % (ref 14–44)
MCH RBC QN AUTO: 18.8 PG (ref 26.8–34.3)
MCH RBC QN AUTO: 18.9 PG (ref 26.8–34.3)
MCHC RBC AUTO-ENTMCNC: 29.1 G/DL (ref 31.4–37.4)
MCHC RBC AUTO-ENTMCNC: 29.8 G/DL (ref 31.4–37.4)
MCV RBC AUTO: 64 FL (ref 82–98)
MCV RBC AUTO: 65 FL (ref 82–98)
MONOCYTES # BLD AUTO: 1.61 THOUSAND/ΜL (ref 0.17–1.22)
MONOCYTES NFR BLD AUTO: 11 % (ref 4–12)
NEUTROPHILS # BLD AUTO: 10.72 THOUSANDS/ΜL (ref 1.85–7.62)
NEUTS SEG NFR BLD AUTO: 73 % (ref 43–75)
NRBC BLD AUTO-RTO: 0 /100 WBCS
PLATELET # BLD AUTO: 449 THOUSANDS/UL (ref 149–390)
PLATELET # BLD AUTO: 478 THOUSANDS/UL (ref 149–390)
PMV BLD AUTO: 8.7 FL (ref 8.9–12.7)
PMV BLD AUTO: 8.8 FL (ref 8.9–12.7)
POTASSIUM SERPL-SCNC: 3.8 MMOL/L (ref 3.5–5.3)
PROT SERPL-MCNC: 7.2 G/DL (ref 6.4–8.2)
PROTHROMBIN TIME: 12.9 SECONDS (ref 11.6–14.5)
RBC # BLD AUTO: 6.02 MILLION/UL (ref 3.88–5.62)
RBC # BLD AUTO: 6.08 MILLION/UL (ref 3.88–5.62)
SODIUM SERPL-SCNC: 142 MMOL/L (ref 136–145)
WBC # BLD AUTO: 11.68 THOUSAND/UL (ref 4.31–10.16)
WBC # BLD AUTO: 14.58 THOUSAND/UL (ref 4.31–10.16)

## 2022-06-05 PROCEDURE — C9113 INJ PANTOPRAZOLE SODIUM, VIA: HCPCS | Performed by: FAMILY MEDICINE

## 2022-06-05 PROCEDURE — 93005 ELECTROCARDIOGRAM TRACING: CPT

## 2022-06-05 PROCEDURE — 36415 COLL VENOUS BLD VENIPUNCTURE: CPT | Performed by: EMERGENCY MEDICINE

## 2022-06-05 PROCEDURE — 85730 THROMBOPLASTIN TIME PARTIAL: CPT | Performed by: EMERGENCY MEDICINE

## 2022-06-05 PROCEDURE — 96375 TX/PRO/DX INJ NEW DRUG ADDON: CPT

## 2022-06-05 PROCEDURE — 85025 COMPLETE CBC W/AUTO DIFF WBC: CPT | Performed by: EMERGENCY MEDICINE

## 2022-06-05 PROCEDURE — 99285 EMERGENCY DEPT VISIT HI MDM: CPT | Performed by: EMERGENCY MEDICINE

## 2022-06-05 PROCEDURE — 85610 PROTHROMBIN TIME: CPT | Performed by: EMERGENCY MEDICINE

## 2022-06-05 PROCEDURE — 85027 COMPLETE CBC AUTOMATED: CPT | Performed by: FAMILY MEDICINE

## 2022-06-05 PROCEDURE — 99220 PR INITIAL OBSERVATION CARE/DAY 70 MINUTES: CPT | Performed by: FAMILY MEDICINE

## 2022-06-05 PROCEDURE — 80053 COMPREHEN METABOLIC PANEL: CPT | Performed by: EMERGENCY MEDICINE

## 2022-06-05 PROCEDURE — 74176 CT ABD & PELVIS W/O CONTRAST: CPT

## 2022-06-05 PROCEDURE — 99285 EMERGENCY DEPT VISIT HI MDM: CPT

## 2022-06-05 PROCEDURE — 83690 ASSAY OF LIPASE: CPT | Performed by: EMERGENCY MEDICINE

## 2022-06-05 PROCEDURE — G1004 CDSM NDSC: HCPCS

## 2022-06-05 PROCEDURE — 96361 HYDRATE IV INFUSION ADD-ON: CPT

## 2022-06-05 PROCEDURE — 83605 ASSAY OF LACTIC ACID: CPT | Performed by: EMERGENCY MEDICINE

## 2022-06-05 PROCEDURE — 84484 ASSAY OF TROPONIN QUANT: CPT | Performed by: EMERGENCY MEDICINE

## 2022-06-05 PROCEDURE — 96374 THER/PROPH/DIAG INJ IV PUSH: CPT

## 2022-06-05 RX ORDER — SULFASALAZINE 500 MG/1
1000 TABLET ORAL 2 TIMES DAILY
Status: DISCONTINUED | OUTPATIENT
Start: 2022-06-05 | End: 2022-06-07 | Stop reason: HOSPADM

## 2022-06-05 RX ORDER — HYDROMORPHONE HCL/PF 1 MG/ML
0.5 SYRINGE (ML) INJECTION EVERY 4 HOURS PRN
Status: DISCONTINUED | OUTPATIENT
Start: 2022-06-05 | End: 2022-06-07 | Stop reason: HOSPADM

## 2022-06-05 RX ORDER — CIPROFLOXACIN 2 MG/ML
400 INJECTION, SOLUTION INTRAVENOUS EVERY 12 HOURS
Status: DISCONTINUED | OUTPATIENT
Start: 2022-06-06 | End: 2022-06-07 | Stop reason: HOSPADM

## 2022-06-05 RX ORDER — SULFASALAZINE 500 MG/1
1000 TABLET ORAL 2 TIMES DAILY
Status: DISCONTINUED | OUTPATIENT
Start: 2022-06-05 | End: 2022-06-05

## 2022-06-05 RX ORDER — PANTOPRAZOLE SODIUM 40 MG/1
40 INJECTION, POWDER, FOR SOLUTION INTRAVENOUS EVERY 12 HOURS SCHEDULED
Status: DISCONTINUED | OUTPATIENT
Start: 2022-06-05 | End: 2022-06-07

## 2022-06-05 RX ORDER — CIPROFLOXACIN 2 MG/ML
400 INJECTION, SOLUTION INTRAVENOUS ONCE
Status: COMPLETED | OUTPATIENT
Start: 2022-06-05 | End: 2022-06-06

## 2022-06-05 RX ORDER — DULOXETIN HYDROCHLORIDE 60 MG/1
60 CAPSULE, DELAYED RELEASE ORAL DAILY
Status: DISCONTINUED | OUTPATIENT
Start: 2022-06-06 | End: 2022-06-07 | Stop reason: HOSPADM

## 2022-06-05 RX ORDER — MORPHINE SULFATE 4 MG/ML
4 INJECTION, SOLUTION INTRAMUSCULAR; INTRAVENOUS ONCE
Status: COMPLETED | OUTPATIENT
Start: 2022-06-05 | End: 2022-06-05

## 2022-06-05 RX ORDER — ONDANSETRON 2 MG/ML
4 INJECTION INTRAMUSCULAR; INTRAVENOUS ONCE
Status: COMPLETED | OUTPATIENT
Start: 2022-06-05 | End: 2022-06-05

## 2022-06-05 RX ORDER — MORPHINE SULFATE 4 MG/ML
4 INJECTION, SOLUTION INTRAMUSCULAR; INTRAVENOUS EVERY 4 HOURS PRN
Status: DISCONTINUED | OUTPATIENT
Start: 2022-06-05 | End: 2022-06-05

## 2022-06-05 RX ORDER — SODIUM CHLORIDE, SODIUM LACTATE, POTASSIUM CHLORIDE, CALCIUM CHLORIDE 600; 310; 30; 20 MG/100ML; MG/100ML; MG/100ML; MG/100ML
100 INJECTION, SOLUTION INTRAVENOUS CONTINUOUS
Status: DISCONTINUED | OUTPATIENT
Start: 2022-06-05 | End: 2022-06-07 | Stop reason: HOSPADM

## 2022-06-05 RX ORDER — ONDANSETRON 2 MG/ML
4 INJECTION INTRAMUSCULAR; INTRAVENOUS EVERY 6 HOURS PRN
Status: DISCONTINUED | OUTPATIENT
Start: 2022-06-05 | End: 2022-06-07 | Stop reason: HOSPADM

## 2022-06-05 RX ORDER — METRONIDAZOLE 500 MG/1
500 TABLET ORAL ONCE
Status: COMPLETED | OUTPATIENT
Start: 2022-06-05 | End: 2022-06-05

## 2022-06-05 RX ORDER — METRONIDAZOLE 500 MG/1
500 TABLET ORAL EVERY 8 HOURS SCHEDULED
Status: DISCONTINUED | OUTPATIENT
Start: 2022-06-06 | End: 2022-06-07 | Stop reason: HOSPADM

## 2022-06-05 RX ORDER — HYDROMORPHONE HCL/PF 1 MG/ML
1 SYRINGE (ML) INJECTION ONCE
Status: COMPLETED | OUTPATIENT
Start: 2022-06-05 | End: 2022-06-05

## 2022-06-05 RX ORDER — HYDROMORPHONE HCL/PF 1 MG/ML
1 SYRINGE (ML) INJECTION EVERY 4 HOURS PRN
Status: DISCONTINUED | OUTPATIENT
Start: 2022-06-05 | End: 2022-06-07 | Stop reason: HOSPADM

## 2022-06-05 RX ADMIN — METRONIDAZOLE 500 MG: 500 TABLET ORAL at 17:03

## 2022-06-05 RX ADMIN — Medication 125 MG: at 21:09

## 2022-06-05 RX ADMIN — CIPROFLOXACIN 400 MG: 2 INJECTION, SOLUTION INTRAVENOUS at 17:02

## 2022-06-05 RX ADMIN — SULFASALAZINE 1000 MG: 500 TABLET ORAL at 21:08

## 2022-06-05 RX ADMIN — ONDANSETRON 4 MG: 2 INJECTION INTRAMUSCULAR; INTRAVENOUS at 14:54

## 2022-06-05 RX ADMIN — MORPHINE SULFATE 4 MG: 4 INJECTION INTRAVENOUS at 16:09

## 2022-06-05 RX ADMIN — SODIUM CHLORIDE, SODIUM LACTATE, POTASSIUM CHLORIDE, AND CALCIUM CHLORIDE 125 ML/HR: .6; .31; .03; .02 INJECTION, SOLUTION INTRAVENOUS at 21:09

## 2022-06-05 RX ADMIN — MORPHINE SULFATE 4 MG: 4 INJECTION INTRAVENOUS at 20:14

## 2022-06-05 RX ADMIN — MORPHINE SULFATE 4 MG: 4 INJECTION INTRAVENOUS at 17:01

## 2022-06-05 RX ADMIN — ONDANSETRON 4 MG: 2 INJECTION INTRAMUSCULAR; INTRAVENOUS at 20:14

## 2022-06-05 RX ADMIN — SODIUM CHLORIDE 1000 ML: 0.9 INJECTION, SOLUTION INTRAVENOUS at 14:56

## 2022-06-05 RX ADMIN — HYDROMORPHONE HYDROCHLORIDE 1 MG: 1 INJECTION, SOLUTION INTRAMUSCULAR; INTRAVENOUS; SUBCUTANEOUS at 21:35

## 2022-06-05 RX ADMIN — HYDROMORPHONE HYDROCHLORIDE 1 MG: 1 INJECTION, SOLUTION INTRAMUSCULAR; INTRAVENOUS; SUBCUTANEOUS at 14:53

## 2022-06-05 RX ADMIN — HYDROMORPHONE HYDROCHLORIDE 1 MG: 1 INJECTION, SOLUTION INTRAMUSCULAR; INTRAVENOUS; SUBCUTANEOUS at 17:05

## 2022-06-05 RX ADMIN — METRONIDAZOLE 500 MG: 500 TABLET ORAL at 23:34

## 2022-06-05 RX ADMIN — PANTOPRAZOLE SODIUM 40 MG: 40 INJECTION, POWDER, FOR SOLUTION INTRAVENOUS at 20:15

## 2022-06-05 RX ADMIN — SODIUM CHLORIDE 1000 ML: 0.9 INJECTION, SOLUTION INTRAVENOUS at 19:41

## 2022-06-05 RX ADMIN — MORPHINE SULFATE 4 MG: 4 INJECTION INTRAVENOUS at 15:43

## 2022-06-05 NOTE — H&P
History and Physical - [facility]    Patient Information: Amber Rivero 32 y o  male MRN: [id number]  Unit/Bed#: [address] [place] [address] Encounter: [id number]  Admitting Physician: Ita Alexander DO  PCP: Perry Go DO  Date of Admission:  06/05/22        Hospital Problem List:     Principal Problem:    Epigastric abdominal pain  Active Problems:    Leukocytosis    COVID-19 virus infection    History of ulcerative colitis    CAR (generalized anxiety disorder)    Ankylosing spondylitis ([facility] )    History of Clostridium difficile infection      Assessment/Plan:    * Epigastric abdominal pain  Assessment & Plan  Patient was recently admitted from 05/30-6/1 with abdominal pain, rectal obstruction  Underwent pouchoscopy and was treated with IV antibiotics  · Seen in the ER twice since then and was discharged home  · Today reports epigastric pain with radiation to the back which felt the same as when he last had pancreatitis  · Lipase level 58 with no pancreatitis on imaging  · Supportive treatment with NPO, IV fluids, pain medications  · GI consult for further management    Leukocytosis  Assessment & Plan  CT scan shows some wall thickening in right abdomen - under distension versus mild colitis  · Patient's pain is in the epigastric region  · Unclear of leukocytosis is more reactive in nature  Patient completed his course of Levaquin/Flagyl yesterday  · For now will place patient on IV Cipro and Flagyl  · Repeat CBC later today and again in a m  COVID-19 virus infection  Assessment & Plan  Tested positive for COVID on 05/30  Received COVID vaccine but no booster  Asymptomatic    History of ulcerative colitis  Assessment & Plan  Patient is status post partial colectomy and ileal pouch formation with stricture of rectum  Patient states he has an appointment coming up with his surgeon     History of Clostridium difficile infection  Assessment & Plan  Continue p o   Vancomycin as prophylaxis    Ankylosing spondylitis (HCC)  Assessment & Plan  Continue sulfasalazine    CAR (generalized anxiety disorder)  Assessment & Plan  Continue Cymbalta          VTE Prophylaxis: Pharmacologic VTE Prophylaxis contraindicated due to GI bleed  / sequential compression device   Code Status: full code    Anticipated Length of Stay:  Patient will be admitted on an Observation basis with an anticipated length of stay of  1 midnights  Justification for Hospital Stay:  Epigastric pain    Total Time for Visit, including Counseling / Coordination of Care: 45 minutes  Greater than 50% of this total time spent on direct patient counseling and coordination of care  Chief Complaint:     Abdominal Pain (Pt reports upper abdominal pain since 10 am)    History of Present Illness:    Yani Larson is a 32 y o  male who presents with epigastric pain  Patient states normally he has lower abdominal pain but today it is in the epigastric region  No acid reflux or heart burn  8-9/10 in intensity  Reports nausea but no vomiting  EAting food makes the pain worse  Pain it Constant in nature and feels similar to when he had pancreatitis  When he eats he gets "gas pain"  Recently admitted with abdominal pain, rectal obstruction and was discharged home on antibiotics  Finished antibiotics yest but still on oral vancomycin  Reports some bright red blood in the stool    Review of Systems:    Review of Systems   Constitutional: Positive for appetite change and chills  Negative for fever  HENT: Negative for congestion  Eyes: Negative for visual disturbance  Respiratory: Negative for cough and shortness of breath  Cardiovascular: Negative for palpitations  Gastrointestinal: Positive for abdominal pain, blood in stool, diarrhea and nausea  Negative for vomiting  Genitourinary: Negative for difficulty urinating, dysuria and hematuria  Musculoskeletal: Positive for back pain  Skin: Negative for wound  Neurological: Negative for dizziness and light-headedness  Hematological: Does not bruise/bleed easily  Psychiatric/Behavioral: Negative for agitation  Past Medical and Surgical History:     Past Medical History:   Diagnosis Date    [facility]yl[facility] ([facility] )     Anxiety     Bowel obstruction (HCC)     Clostridium difficile colitis 9/13/2018    Colitis     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[facility] ) 9/13/2018    Pancreatitis     Ulcerative colitis ([facility] )        Past Surgical History:   Procedure Laterality Date    COLECTOMY TOTAL      with ileal pouch and anastemosis    IR PICC PLACEMENT SINGLE LUMEN  3/1/2022    TOTAL COLECTOMY         Meds/Allergies:    PTA meds:   Prior to Admission Medications   Prescriptions Last Dose Informant Patient Reported? Taking? DULoxetine (CYMBALTA) 60 mg delayed release capsule   No No   Sig: Take 1 capsule (60 mg total) by mouth daily   levofloxacin (LEVAQUIN) 500 mg tablet   No No   Sig: Take 1 tablet (500 mg total) by mouth daily for 5 days   metroNIDAZOLE (FLAGYL) 500 mg tablet   No No   Sig: Take 1 tablet (500 mg total) by mouth every 8 (eight) hours for 5 days   morphine (MSIR) 15 mg tablet   No No   Sig: Take 1 tablet (15 mg total) by mouth every 4 (four) hours as needed for severe pain for up to 10 days Max Daily Amount: 90 mg   predniSONE 50 mg tablet   No No   Sig: Take 1 tablet (50 mg total) by mouth daily for 5 days   sulfaSALAzine (AZULFIDINE) 500 mg tablet   No No   Sig: Take 2 tablets (1,000 mg total) by mouth 2 (two) times a day   vancomycin (VANCOCIN) 125 MG capsule   No No   Sig: Take 1 capsule (125 mg total) by mouth every 12 (twelve) hours for 15 doses      Facility-Administered Medications: None       Allergies:    Allergies   Allergen Reactions    Mesalamine GI Intolerance     Other reaction(s): Pancreatitis  Annotation - [id number]: pancreatitis    Cefazolin Hives     History:     Marital Status: Single     Substance Use History:   Social History Substance and Sexual Activity   Alcohol Use Yes    Comment: pt states 5 a month/socially     Social History     Tobacco Use   Smoking Status Never Smoker   Smokeless Tobacco Never Used     Social History     Substance and Sexual Activity   Drug Use No       Family History:    History reviewed  No pertinent family history  Physical Exam:     Vitals:   Blood Pressure: 130/95 (06/05/22 1452)  Pulse: 97 (06/05/22 1452)  Respirations: 20 (06/05/22 1452)  SpO2: 98 % (06/05/22 1452)    Physical Exam  Constitutional:       General: He is not in acute distress  Appearance: He is not diaphoretic  HENT:      Head: Normocephalic and atraumatic  Eyes:      General:         Right eye: No discharge  Left eye: No discharge  Cardiovascular:      Rate and Rhythm: Normal rate and regular rhythm  Pulmonary:      Effort: Pulmonary effort is normal  No respiratory distress  Breath sounds: Normal breath sounds  No wheezing or rales  Abdominal:      General: Bowel sounds are normal  There is no distension  Palpations: Abdomen is soft  Tenderness: There is abdominal tenderness (epigastric)  There is no guarding  Neurological:      Mental Status: He is alert and oriented to person, place, and time  Lab Results: I have personally reviewed pertinent reports  Results from last 7 days   Lab Units 06/05/22  1500   WBC Thousand/uL 14 58*   HEMOGLOBIN g/dL 11 4*   HEMATOCRIT % 38 2   PLATELETS Thousands/uL 478*   NEUTROS PCT % 73   LYMPHS PCT % 14   MONOS PCT % 11   EOS PCT % 1     Results from last 7 days   Lab Units 06/05/22  1500   POTASSIUM mmol/L 3 8   CHLORIDE mmol/L 104   CO2 mmol/L 27   BUN mg/dL 13   CREATININE mg/dL 1 02   CALCIUM mg/dL 8 5   ALK PHOS U/L 77   ALT U/L 25   AST U/L 25     Results from last 7 days   Lab Units 06/05/22  1532   INR  0 99       Imaging: I have personally reviewed pertinent reports        CT abdomen pelvis wo contrast    Result Date: 6/5/2022  Narrative: CT ABDOMEN AND PELVIS WITHOUT IV CONTRAST INDICATION: Epigastric pain  COMPARISON:  6/4/2022 TECHNIQUE:  CT examination of the abdomen and pelvis was performed without intravenous contrast  This examination was performed without intravenous contrast in the context of the critical nationwide Omnipaque shortage  Axial, sagittal, and coronal 2D reformatted images were created from the source data and submitted for interpretation  Radiation dose length product (DLP) for this visit:  470 mGy-cm   This examination, like all CT scans performed in the [facility], was performed utilizing techniques to minimize radiation dose exposure, including the use of iterative reconstruction and automated exposure control  Enteric contrast was not administered  FINDINGS: ABDOMEN LOWER CHEST:  Clear lung bases  LIVER/BILIARY TREE:  Unremarkable  GALLBLADDER:  No calcified gallstones  No pericholecystic inflammatory change  SPLEEN:  Unremarkable  PANCREAS:  Unremarkable  ADRENAL GLANDS:  Unremarkable  KIDNEYS/URETERS:  No nephrolithiasis or obstructive uropathy  STOMACH AND BOWEL: Postsurgical change from partial colectomy mild wall thickening in the colon in the right abdomen may be due to underdistention versus colitis  No evidence of bowel obstruction  APPENDIX:  Surgically absent  ABDOMINOPELVIC CAVITY:  No ascites  No pneumoperitoneum  No lymphadenopathy  VESSELS:  No abdominal aortic aneurysm  PELVIS REPRODUCTIVE ORGANS:  No prostate enlargement  URINARY BLADDER:  Unremarkable  ABDOMINAL WALL/INGUINAL REGIONS:  Unremarkable  OSSEOUS STRUCTURES:  No acute fracture or destructive osseous lesion  Impression: 1  Wall thickening in the colon in the right abdomen may be due to underdistention versus mild colitis  No evidence of bowel obstruction  2   No evidence of pancreatitis   Workstation performed: [id number]     CT abdomen pelvis wo contrast    Result Date: 6/2/2022  Narrative: CT ABDOMEN AND PELVIS WITHOUT IV CONTRAST INDICATION:   follow contrast  COMPARISON:  Earlier on June 2, 2022 at 311 a m  TECHNIQUE:  CT examination of the abdomen and pelvis was performed without intravenous contrast  This examination was performed without intravenous contrast in the context of the critical nationwide Omnipaque shortage  Axial, sagittal, and coronal 2D reformatted images were created from the source data and submitted for interpretation  Radiation dose length product (DLP) for this visit:  475 32 mGy-cm   This examination, like all CT scans performed in the [facility], was performed utilizing techniques to minimize radiation dose exposure, including the use of iterative  reconstruction and automated exposure control  Enteric contrast was administered for the earlier examination  FINDINGS: ABDOMEN LOWER CHEST:  Subtle patchy groundglass densities in the posterior medial left lung base and in the central right lower lobe, most suspicious for an infectious or inflammatory process are reidentified  LIVER/BILIARY TREE:  Unremarkable  GALLBLADDER:  No calcified gallstones  No pericholecystic inflammatory change  SPLEEN:  Unremarkable  PANCREAS:  Unremarkable  ADRENAL GLANDS:  Unremarkable  KIDNEYS/URETERS:  Unremarkable  No hydronephrosis  STOMACH AND BOWEL:  Again noted are postoperative changes of prior total colectomy with an ileal pouch-anal anastomosis  Enteric contrast measures [earlier examination has now passed as far distal as the ileal pouch  Although there are mildly distended  distal small bowel loops, there is no evidence for bowel obstruction  APPENDIX:  Surgically absent  ABDOMINOPELVIC CAVITY:  No ascites  No pneumoperitoneum  There is an unchanged 1 8 x 1 4 pelvic lymph node on image 65 of series 2  VESSELS:  Unremarkable for patient's age  PELVIS REPRODUCTIVE ORGANS:  Unremarkable for patient's age  URINARY BLADDER:  Unremarkable   ABDOMINAL WALL/INGUINAL REGIONS: Unremarkable  OSSEOUS STRUCTURES:  No acute fracture or destructive osseous lesion  Impression: Enteric contrast administered for examination performed approximately 4 hours earlier has now reached the ileal pouch indicating that there is no bowel obstruction  Reidentification of abnormally enlarged right pelvic node, unchanged in size from recent prior examinations  Subtle patchy groundglass airspace opacities in the lower lung zones suspicious for infectious/inflammatory process  Workstation performed: [id number]     CT abdomen pelvis wo contrast    Result Date: 6/2/2022  Narrative: CT ABDOMEN AND PELVIS WITHOUT IV CONTRAST INDICATION:   Bowel obstruction suspected r/o obstruction  COMPARISON:  CT abdomen/pelvis dated May 30, 2022  TECHNIQUE:  CT examination of the abdomen and pelvis was performed without intravenous contrast  This examination was performed without intravenous contrast in the context of the critical nationwide Omnipaque shortage  Axial, sagittal, and coronal 2D reformatted images were created from the source data and submitted for interpretation  Radiation dose length product (DLP) for this visit:  475 52 mGy-cm   This examination, like all CT scans performed in the [facility], was performed utilizing techniques to minimize radiation dose exposure, including the use of iterative  reconstruction and automated exposure control  Enteric contrast was administered  FINDINGS: ABDOMEN LOWER CHEST:  There a few subtle groundglass densities noted at both lung bases suspicious for an infectious or inflammatory process  LIVER/BILIARY TREE:  Unremarkable  GALLBLADDER:  No calcified gallstones  No pericholecystic inflammatory change  SPLEEN:  Unremarkable  PANCREAS:  Unremarkable  ADRENAL GLANDS:  Unremarkable  KIDNEYS/URETERS:  Unremarkable  No hydronephrosis  STOMACH AND BOWEL:  Again noted are postoperative changes of prior total colectomy with an ileal pouch-anal anastomosis  Although there are a few prominent loops of proximal small bowel, the administered oral contrast extends to the mid to distal small bowel  There is liquid stool-like material noted at the ileal pouch  There is a segment of collapsed distal small bowel with the right lower quadrant and pelvis  APPENDIX:  Surgically absent  ABDOMINOPELVIC CAVITY:  No ascites  No pneumoperitoneum  There is a stable right pelvic iliac lymph node measuring 14 mm in short axis (series 2, image 65)  VESSELS:  Unremarkable for patient's age  PELVIS REPRODUCTIVE ORGANS:  Unremarkable for patient's age  URINARY BLADDER:  Unremarkable  ABDOMINAL WALL/INGUINAL REGIONS:  Unremarkable  OSSEOUS STRUCTURES:  No acute fracture or destructive osseous lesion  Impression: Reidentified postoperative changes of prior total colectomy with ileal pouch and anastomosis  There are a few prominent, borderline dilated loops of proximal small bowel  However, the administered oral contrast extends to the mid to distal small bowel which are of normal caliber  There is no obvious high-grade obstruction  A repeat delayed CT abdomen/pelvis in 4 hours could be performed to assess for further transit of the administered oral contrast  A few subtle groundglass densities are noted at both visualized lung bases suspicious for an infectious or inflammatory process  No free air or free fluid  Stable mild pelvic lymphadenopathy  Workstation performed: [id number]     CT abdomen pelvis wo contrast    Result Date: 5/30/2022  Narrative: CT ABDOMEN AND PELVIS WITHOUT IV CONTRAST INDICATION:   Abdominal pain, acute, nonlocalized abdominal pain  History of ulcerative colitis status post complete colectomy  J-pouch ileoanal anastomosis with history of chronic recurrent strictures   COMPARISON:  May 14, 2022 TECHNIQUE:  CT examination of the abdomen and pelvis was performed without intravenous contrast  This examination was performed without intravenous contrast in the context of the critical nationwide Omnipaque shortage  Axial, sagittal, and coronal 2D reformatted images were created from the source data and submitted for interpretation  Radiation dose length product (DLP) for this visit:  551 mGy-cm   This examination, like all CT scans performed in the [facility], was performed utilizing techniques to minimize radiation dose exposure, including the use of iterative reconstruction and automated exposure control  Enteric contrast was not administered  FINDINGS: ABDOMEN LOWER CHEST:  No clinically significant abnormality identified in the visualized lower chest  LIVER/BILIARY TREE:  Unremarkable  GALLBLADDER:  No calcified gallstones  No pericholecystic inflammatory change  SPLEEN:  Unremarkable  PANCREAS:  Unremarkable  ADRENAL GLANDS:  Unremarkable  KIDNEYS/URETERS:  Unremarkable  No hydronephrosis  STOMACH AND BOWEL:  Limited evaluation of GI tract without oral contrast   Patient is status post total colectomy and J-pouch ileoanal anastomosis  Gaseous distention of ileal small bowel up to 8 cm with large volume of formed stool above the ileoanal anastomosis which appears narrowed  Dilatation of a distal jejunal/proximal ileal loop up to 6 cm containing gas and partially formed stool  A segment of small bowel in the right upper pelvis demonstrate circumferential wall thickening averaging 4 mm without pneumatosis or surrounding inflammation  The more proximal small bowel averages up to 2 cm diameter containing gas and fluid  Stomach is partially distended  APPENDIX:  Removed ABDOMINOPELVIC CAVITY:  No ascites  No pneumoperitoneum  Right pelvic iliac node measures 1 8 x 1 4 cm, previously 1 8 x 1 4 cm  VESSELS:  Unremarkable for patient's age  PELVIS REPRODUCTIVE ORGANS:  Unremarkable for patient's age  URINARY BLADDER:  Unremarkable  ABDOMINAL WALL/INGUINAL REGIONS:  Small fat-containing inguinal hernias  Stable Postop changes right anterior abdominal wall  OSSEOUS STRUCTURES:  No acute fracture or destructive osseous lesion  Impression: Findings compatible with at least partial small bowel obstruction at the level of the ileoanal anastomosis causing dilatation of distal small bowel up to 8 cm and retention of large volumes of gas and stool  Circumferential Inflammation involving distal jejunum/proximal ileum in the right lower abdomen contributing to a 2nd transition point is also partially obstructing approximately 4 cm beyond the small bowel-small bowel anastomotic line in the right mid to upper abdomen  Stable pelvic adenopathy  Findings are consistent with the preliminary report from [facility] which was provided shortly after completion of the exam   Workstation performed: [id number]     CT abdomen pelvis wo contrast    Result Date: 5/14/2022  Narrative: CT ABDOMEN AND PELVIS WITHOUT IV CONTRAST INDICATION:   r/o complication prior colectomy / revision     vs UC complication  COMPARISON:  None  TECHNIQUE:  CT examination of the abdomen and pelvis was performed without intravenous contrast  This examination was performed without intravenous contrast in the context of the critical nationwide Omnipaque shortage  Axial, sagittal, and coronal 2D reformatted images were created from the source data and submitted for interpretation  Radiation dose length product (DLP) for this visit:  493 mGy-cm   This examination, like all CT scans performed in the [facility], was performed utilizing techniques to minimize radiation dose exposure, including the use of iterative reconstruction and automated exposure control  Enteric contrast was administered  FINDINGS: ABDOMEN LOWER CHEST:  No clinically significant abnormality identified in the visualized lower chest  LIVER/BILIARY TREE:  Unremarkable  GALLBLADDER:  No calcified gallstones  No pericholecystic inflammatory change  SPLEEN:  Unremarkable  PANCREAS:  Unremarkable   ADRENAL GLANDS: Unremarkable  KIDNEYS/URETERS:  Unremarkable  No hydronephrosis  STOMACH AND BOWEL:  Unremarkable  APPENDIX:  Status post colectomy without obvious complication  No surrounding fluid collection    ABDOMINOPELVIC CAVITY:  No ascites  No pneumoperitoneum  1 3 cm midline pelvic and 1 4 cm right iliac chain lymph nodes are stable (series 2, image 61 and 65)    VESSELS:  Unremarkable for patient's age  PELVIS REPRODUCTIVE ORGANS:  Unremarkable for patient's age  URINARY BLADDER:  Unremarkable  ABDOMINAL WALL/INGUINAL REGIONS:  Unremarkable  OSSEOUS STRUCTURES:  No acute fracture or destructive osseous lesion  Impression: Unremarkable appearance following prior colectomy  No evidence of obstruction or large intra-abdominal collection  Workstation performed: [id number]     XR abdomen 1 view kub    Result Date: 6/4/2022  Narrative: ABDOMEN INDICATION:   eval contrast location  COMPARISON:  CT and acute obstruction series 6/2/2022 VIEWS:  AP supine FINDINGS: Dilute contrast seen in the lower right abdomen into the midline pelvis in area of reported ileoanal anastomosis  Multiple dilated small bowel loops with lower right abdomen and upper midabdomen markedly dilated loops up to 11 cm  No discernible free air on this supine study  Upright or left lateral decubitus imaging is more sensitive to detect subtle free air in the appropriate setting  No pathologic calcifications or soft tissue masses  Visualized lung bases are clear  Visualized osseous structures are unremarkable for the patient's age  Impression: Diluted contrast midline pelvis in the area of reported ileal anal anastomosis  Dilated bowel loops greatest upper abdomen; 11 cm Workstation performed: [id number]     XR abdomen obstruction series    Result Date: 6/2/2022  Narrative: OBSTRUCTION SERIES INDICATION:   pain   COMPARISON:  CT abdomen pelvis 5/30/2022 EXAM PERFORMED/VIEWS:  XR ABDOMEN OBSTRUCTION SERIES FINDINGS: Patient is status post total colectomy and J-pouch ileoanal anastomosis  Persistent distended small bowel loops  No free air beneath the hemidiaphragms  No pathologic calcifications or soft tissue masses evident  Osseous structures are unremarkable  Examination of the chest reveals a normal cardiomediastinal silhouette  Lungs are clear  Impression: Persistent distended small bowel loops, status post total colectomy and J-pouch ileoanal anastomosis  This is better assessed in subsequent CT abdomen/pelvis  Workstation performed: [id number]     CT renal stone study abdomen pelvis wo contrast    Result Date: 6/4/2022  Narrative: CT ABDOMEN AND PELVIS WITHOUT IV CONTRAST - LOW DOSE RENAL STONE INDICATION:   eval contrast transit  Abdominal pain  Pt stating he feels like he is obstructing again,trying to get ahead of it  Pt feeling burning sensation, last bm 2200 6/3 The patient's entire past medical history was obtained directly from either the attending emergency room physicians notes and/or the resident/physician's assistant notes in [address] [place] [address]  The information was copied and pasted directly from [facility]  Patient has confirmed COVID-19  Patient tested positive for Covid 19 on May 30, 2022  COMPARISON:  Numerous prior CT examinations, most recent of which is dated June 2, 2022  TECHNIQUE:  Low radiation dose thin section CT examination of the abdomen and pelvis was performed without intravenous or oral contrast according to a protocol specifically designed to evaluate for urinary tract calculus  Axial, sagittal, and coronal 2D  reformatted images were created from the source data and submitted for interpretation  Evaluation for pathology in the abdomen and pelvis that is unrelated to urinary tract calculi is limited  Radiation dose length product (DLP) for this visit:  212 mGy-cm     This examination, like all CT scans performed in the [facility], was performed utilizing techniques to minimize radiation dose exposure, including the use of iterative reconstruction and automated exposure control  URINARY TRACT FINDINGS: RIGHT KIDNEY AND URETER:  No urinary tract calculi  No hydronephrosis or hydroureter  LEFT KIDNEY AND URETER:  No urinary tract calculi  No hydronephrosis or hydroureter  URINARY BLADDER:  Decompressed  Inadequately evaluated  ADDITIONAL FINDINGS: LOWER CHEST:  Interval development of nodular groundglass infiltrates in the lower lobes of the lungs bilaterally  SOLID VISCERA: Limited low radiation dose noncontrast CT evaluation demonstrates no clinically significant abnormality of the imaged portions of the liver, spleen, pancreas, or adrenal glands  GALLBLADDER/BILIARY TREE:  No calcified gallstones  No pericholecystic inflammatory change  No biliary dilatation  STOMACH AND BOWEL:  The stomach is distended with ingested food products and air  Hiatal hernia  The proximal small bowel is unopacified  The mid small bowel is segmentally distended with oral contrast material  The distal small bowel is relatively decompressed and inadequately evaluated  There has been total colectomy  There is an ileal pouch with ileoanal anastomosis  The pouch is incompletely distended with residual oral contrast material and air  APPENDIX:  Surgically absent  ABDOMINOPELVIC CAVITY:  No ascites  No pneumoperitoneum  No lymphadenopathy  Stable enlarged right pelvic lymph node (series 2, image 128)  REPRODUCTIVE ORGANS:  Unremarkable for patient's age  ABDOMINAL WALL/INGUINAL REGIONS:  Unremarkable  OSSEOUS STRUCTURES:  No acute fracture or destructive osseous lesion  Impression: 1  Limited examination due to lack of intravenous contrast material as well as segmental visualization of the small bowel  2   No evidence of small bowel obstruction, however the contrast material has not reached the distal small bowel, ileal pouch or ileoanal anastomosis    Oral contrast material in the ileal pouch and ileoanal anastomosis, secondary to retained oral contrast material from earlier CT abdomen pelvis  The ileal pouch is incompletely distended with oral contrast material and air and inadequately evaluated  Given the recurrence of the patient's symptoms, recommend follow-up GI consultation and MR enterography to exclude abnormal loops of small bowel  If there is concern for ileal pouchitis, recommend follow-up GI consultation  Workstation performed: [id number]     Flexible Sigmoidoscopy    Result Date: 5/31/2022  Narrative: [address] [place] [address] Operating Room [address] [place] [address] [phone] DATE OF SERVICE: 5/31/22 PHYSICIAN(S): Attending: Frandy Vinson MD Fellow: No Staff Documented INDICATION: Rectal obstruction POST-OP DIAGNOSIS: See the impression below  HISTORY: Prior colonoscopy: Less than 3 years ago  It is being repeated at an interval of less than 3 years because: This colonoscopy is being performed for a diagnostic indication BOWEL PREPARATION:  PREPROCEDURE: Informed consent was obtained for the procedure, including sedation  Risks including but not limited to bleeding, infection, perforation, adverse drug reaction and aspiration were explained in detail  Also explained about less than 100% sensitivity with the exam and other alternatives  The patient was placed in the left lateral decubitus position  DETAILS OF PROCEDURE: Patient was taken to the procedure room where a time out was performed to confirm correct patient and correct procedure  The patient underwent monitored anesthesia care, which was administered by an anesthesia professional  The patient's blood pressure, heart rate, level of consciousness, oxygen and respirations were monitored throughout the procedure  A digital rectal exam was performed  The scope was introduced through the anus and advanced to the terminal ileum  Retroflexion was performed in the rectum  The patient experienced no blood loss  The procedure was not difficult   The patient tolerated the procedure well  There were no apparent complications  ANESTHESIA INFORMATION: ASA: II Anesthesia Type: IV Sedation with Anesthesia MEDICATIONS: No administrations occurring from 1346 to 1421 on 05/31/22 FINDINGS: Mild, patchy erythematous mucosa; performed cold forceps biopsy  Ileoanal anastomosis was seen, small bowel had some erythema punctate ulcers, biopsied  Ileoanal anastomosis was is open but about 16 to18 mm in caliber  This was dilated with 20 mm balloon for 30 seconds  EVENTS: Procedure Events Event Event Time ENDO SCOPE OUT TIME 5/31/2022  2:04 PM SPECIMENS: ID Type Source Tests Collected by Time Destination 1 : bx-Ileum pouch- ulcerative colitis Tissue Small Bowel, NOS TISSUE EXAM Maru Shepard MD 5/31/2022  2:05 PM  EQUIPMENT: Colonoscope -     Impression: Ulcerated area distal small bowel with yellowish exudate, biopsied  Ileoanal stricture was dilated to 20 mm with balloon RECOMMENDATION: There is no recommended follow-up for this procedure  Patient will follow-up with his IBD doctor at [facility]  Maru Shepard MD       CT abdomen pelvis wo contrast   Final Result         1  Wall thickening in the colon in the right abdomen may be due to underdistention versus mild colitis  No evidence of bowel obstruction  2   No evidence of pancreatitis  Workstation performed: [id number]             EKG, Pathology, and Other Studies Reviewed on Admission:   · No ekg noted    Allscripts/EPIC Records Reviewed: Yes     ** Please Note: "This note has been constructed using a voice recognition system  Therefore there may be syntax, spelling, and/or grammatical errors   Please call if you have any questions  "**

## 2022-06-05 NOTE — ASSESSMENT & PLAN NOTE
Patient is status post partial colectomy and ileal pouch formation with stricture of rectum  Patient advised to follow-up with his surgeon as soon as possible after discharge

## 2022-06-05 NOTE — ASSESSMENT & PLAN NOTE
Patient was recently admitted from 05/30-6/1 with abdominal pain, rectal obstruction  Underwent pouchoscopy and was treated with IV antibiotics  · Seen in the ER twice since then and was discharged home  · Lipase level 58 with no pancreatitis on imaging  · Supportive treatment with IV fluids, pain medications  Diet was advanced and patient tolerating solids by day of discharge  · CTA done ruled out PE  Right upper quadrant ultrasound was normal   EGD was normal   Biopsies were taken to evaluate for H pylori tomorrow as well  · Follow-up with GI after discharge    No indication for Protonix on discharge

## 2022-06-05 NOTE — ED PROVIDER NOTES
History  Chief Complaint   Patient presents with    Abdominal Pain     Pt reports upper abdominal pain since 8am     26-year-old male presents the ED complaining of epigastric discomfort stating going up in his chest a little bit  Patient states that he does have a history of pancreatitis in the past as well as bowel obstruction and feels it feels little more like pancreatitis again  Has some nausea no vomiting no diarrhea states the pain is 10/10 at this point  No other events that he is aware of  History provided by:  Patient   used: No        Prior to Admission Medications   Prescriptions Last Dose Informant Patient Reported? Taking?    DULoxetine (CYMBALTA) 60 mg delayed release capsule   No No   Sig: Take 1 capsule (60 mg total) by mouth daily   levofloxacin (LEVAQUIN) 500 mg tablet   No No   Sig: Take 1 tablet (500 mg total) by mouth daily for 5 days   metroNIDAZOLE (FLAGYL) 500 mg tablet   No No   Sig: Take 1 tablet (500 mg total) by mouth every 8 (eight) hours for 5 days   morphine (MSIR) 15 mg tablet   No No   Sig: Take 1 tablet (15 mg total) by mouth every 4 (four) hours as needed for severe pain for up to 10 days Max Daily Amount: 90 mg   predniSONE 50 mg tablet   No No   Sig: Take 1 tablet (50 mg total) by mouth daily for 5 days   sulfaSALAzine (AZULFIDINE) 500 mg tablet   No No   Sig: Take 2 tablets (1,000 mg total) by mouth 2 (two) times a day   vancomycin (VANCOCIN) 125 MG capsule   No No   Sig: Take 1 capsule (125 mg total) by mouth every 12 (twelve) hours for 15 doses      Facility-Administered Medications: None       Past Medical History:   Diagnosis Date    Ankylosing spondylitis (HCC)     Anxiety     Bowel obstruction (HCC)     Clostridium difficile colitis 9/13/2018    Colitis     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[facility] [address] ) 9/13/2018    Pancreatitis     Ulcerative colitis ([facility] [address] )        Past Surgical History:   Procedure Laterality Date    COLECTOMY TOTAL      with ileal pouch and anastemosis    IR PICC PLACEMENT SINGLE LUMEN  3/1/2022    TOTAL COLECTOMY         History reviewed  No pertinent family history  I have reviewed and agree with the history as documented  E-Cigarette/Vaping    E-Cigarette Use Never User      E-Cigarette/Vaping Substances    Nicotine No     THC No     CBD No     Flavoring No     Other No     Unknown No      Social History     Tobacco Use    Smoking status: Never Smoker    Smokeless tobacco: Never Used   Vaping Use    Vaping Use: Never used   Substance Use Topics    Alcohol use: Yes     Comment: pt states 5 a month/socially    Drug use: No       Review of Systems   Constitutional: Negative for activity change, chills, diaphoresis and fever  HENT: Negative for congestion, ear pain, nosebleeds, sore throat, trouble swallowing and voice change  Eyes: Negative for pain, discharge and redness  Respiratory: Negative for apnea, cough, choking, shortness of breath, wheezing and stridor  Cardiovascular: Positive for chest pain  Negative for palpitations  Gastrointestinal: Negative for abdominal distention, abdominal pain, constipation, diarrhea, nausea and vomiting  Endocrine: Negative for polydipsia  Genitourinary: Negative for difficulty urinating, dysuria, flank pain, frequency, hematuria and urgency  Musculoskeletal: Negative for back pain, gait problem, joint swelling, myalgias, neck pain and neck stiffness  Skin: Negative for pallor and rash  Neurological: Negative for dizziness, tremors, syncope, speech difficulty, weakness, numbness and headaches  Hematological: Negative for adenopathy  Psychiatric/Behavioral: Negative for confusion, hallucinations, self-injury and suicidal ideas  The patient is not nervous/anxious  Physical Exam  Physical Exam  Vitals and nursing note reviewed  Constitutional:       General: He is not in acute distress  Appearance: He is well-developed  He is not diaphoretic     HENT:      Head: Normocephalic and atraumatic  Right Ear: External ear normal       Left Ear: External ear normal       Nose: Nose normal    Eyes:      Conjunctiva/sclera: Conjunctivae normal       Pupils: Pupils are equal, round, and reactive to light  Cardiovascular:      Rate and Rhythm: Normal rate and regular rhythm  Heart sounds: Normal heart sounds  Pulmonary:      Effort: Pulmonary effort is normal       Breath sounds: Normal breath sounds  Abdominal:      General: Bowel sounds are normal       Palpations: Abdomen is soft  Tenderness: There is abdominal tenderness  Comments: Epigastric tenderness   Musculoskeletal:         General: Normal range of motion  Cervical back: Normal range of motion and neck supple  Skin:     General: Skin is warm and dry  Neurological:      Mental Status: He is alert and oriented to person, place, and time  Deep Tendon Reflexes: Reflexes are normal and symmetric  Psychiatric:         Behavior: Behavior is cooperative           Vital Signs  ED Triage Vitals [06/05/22 1452]   Temp Pulse Respirations Blood Pressure SpO2   -- 97 20 130/95 98 %      Temp src Heart Rate Source Patient Position - Orthostatic VS BP Location FiO2 (%)   -- Monitor Lying Right arm --      Pain Score       8           Vitals:    06/05/22 1452   BP: 130/95   Pulse: 97   Patient Position - Orthostatic VS: Lying         Visual Acuity      ED Medications  Medications   sodium chloride 0 9 % bolus 1,000 mL (has no administration in time range)   metroNIDAZOLE (FLAGYL) tablet 500 mg (has no administration in time range)   vancomycin (VANCOCIN) oral solution 125 mg (has no administration in time range)   DULoxetine (CYMBALTA) delayed release capsule 60 mg (has no administration in time range)   lactated ringers infusion (has no administration in time range)   ondansetron (ZOFRAN) injection 4 mg (has no administration in time range)   morphine (PF) 4 mg/mL injection 4 mg (has no administration in time range)   morphine injection 2 mg (has no administration in time range)   sulfaSALAzine (AZULFIDINE) tablet 1,000 mg (has no administration in time range)   sodium chloride 0 9 % bolus 1,000 mL (1,000 mL Intravenous New Bag 6/5/22 1456)   HYDROmorphone (DILAUDID) injection 1 mg (1 mg Intravenous Given 6/5/22 1453)   ondansetron (ZOFRAN) injection 4 mg (4 mg Intravenous Given 6/5/22 1454)   morphine (PF) 4 mg/mL injection 4 mg (4 mg Intravenous Given 6/5/22 1543)   morphine (PF) 4 mg/mL injection 4 mg (4 mg Intravenous Given 6/5/22 1609)   ciprofloxacin (CIPRO) IVPB (premix in 5% dextrose) 400 mg 200 mL (400 mg Intravenous New Bag 6/5/22 1702)   metroNIDAZOLE (FLAGYL) tablet 500 mg (500 mg Oral Given 6/5/22 1703)   morphine (PF) 4 mg/mL injection 4 mg (4 mg Intravenous Given 6/5/22 1701)   HYDROmorphone (DILAUDID) injection 1 mg (1 mg Intravenous Given 6/5/22 1705)       Diagnostic Studies  Results Reviewed     Procedure Component Value Units Date/Time    HS Troponin 0hr (reflex protocol) [[id number]]  (Normal) Collected: 06/05/22 1532    Lab Status: Final result Specimen: Blood from Arm, Left Updated: 06/05/22 1605     hs TnI 0hr 4 ng/L     HS Troponin I 2hr [[id number]]     Lab Status: No result Specimen: Blood     HS Troponin I 4hr [[id number]]     Lab Status: No result Specimen: Blood     Lactic acid [[id number]]  (Normal) Collected: 06/05/22 1532    Lab Status: Final result Specimen: Blood from Arm, Left Updated: 06/05/22 1601     LACTIC ACID 1 0 mmol/L     Narrative:      Result may be elevated if tourniquet was used during collection      Cleave Alea [[id number]]  (Normal) Collected: 06/05/22 1532    Lab Status: Final result Specimen: Blood from Arm, Left Updated: 06/05/22 1555     Protime 12 9 seconds      INR 0 99    APTT [[id number]]  (Normal) Collected: 06/05/22 1532    Lab Status: Final result Specimen: Blood from Arm, Left Updated: 06/05/22 1555     PTT 28 seconds     Comprehensive metabolic panel [[id number]] Collected: 06/05/22 1500    Lab Status: Final result Specimen: Blood from Arm, Left Updated: 06/05/22 1521     Sodium 142 mmol/L      Potassium 3 8 mmol/L      Chloride 104 mmol/L      CO2 27 mmol/L      ANION GAP 11 mmol/L      BUN 13 mg/dL      Creatinine 1 02 mg/dL      Glucose 97 mg/dL      Calcium 8 5 mg/dL      AST 25 U/L      ALT 25 U/L      Alkaline Phosphatase 77 U/L      Total Protein 7 2 g/dL      Albumin 3 6 g/dL      Total Bilirubin 0 36 mg/dL      eGFR 98 ml/min/1 73sq m     Narrative:      National Kid[employer] guidelines for Chronic Kidney Disease (CKD):     Stage 1 with normal or high GFR (GFR > 90 mL/min/1 73 square meters)    Stage 2 Mild CKD (GFR = 60-89 mL/min/1 73 square meters)    Stage 3A Moderate CKD (GFR = 45-59 mL/min/1 73 square meters)    Stage 3B Moderate CKD (GFR = 30-44 mL/min/1 73 square meters)    Stage 4 Severe CKD (GFR = 15-29 mL/min/1 73 square meters)    Stage 5 End Stage CKD (GFR <15 mL/min/1 73 square meters)  Note: GFR calculation is accurate only with a steady state creatinine    Lipase [[id number]]  (Abnormal) Collected: 06/05/22 1500    Lab Status: Final result Specimen: Blood from Arm, Left Updated: 06/05/22 1521     Lipase 58 u/L     CBC and differential [[id number]]  (Abnormal) Collected: 06/05/22 1500    Lab Status: Final result Specimen: Blood from Arm, Left Updated: 06/05/22 1507     WBC 14 58 Thousand/uL      RBC 6 02 Million/uL      Hemoglobin 11 4 g/dL      Hematocrit 38 2 %      MCV 64 fL      MCH 18 9 pg      MCHC 29 8 g/dL      RDW 18 1 %      MPV 8 8 fL      Platelets 184 Thousands/uL      nRBC 0 /100 WBCs      Neutrophils Relative 73 %      Immat GRANS % 0 %      Lymphocytes Relative 14 %      Monocytes Relative 11 %      Eosinophils Relative 1 %      Basophils Relative 1 %      Neutrophils Absolute 10 72 Thousands/µL      Immature Grans Absolute 0 04 Thousand/uL      Lymphocytes Absolute 2 05 Thousands/µL      Monocytes Absolute 1 61 Thousand/µL      Eosinophils Absolute 0 09 Thousand/µL      Basophils Absolute 0 07 Thousands/µL                  CT abdomen pelvis wo contrast   Final Result by Haven Su MD (06/05 1612)         1  Wall thickening in the colon in the right abdomen may be due to underdistention versus mild colitis  No evidence of bowel obstruction  2   No evidence of pancreatitis  Workstation performed: [id number]                    Procedures  Procedures         ED Course                                             MDM    Disposition  Final diagnoses:   Generalized abdominal pain   Colitis     Time reflects when diagnosis was documented in both MDM as applicable and the Disposition within this note     Time User Action Codes Description Comment    6/5/2022  4:27 PM Salina Otoole Add [R10 84] Generalized abdominal pain     6/5/2022  4:27 PM Salina Otoole Add [K52 9] Colitis       ED Disposition     ED Disposition   Admit    Condition   Stable    Date/Time   Sun Jun 5, 2022  4:27 PM    Comment   Case was discussed with Dr Tonja Limon and the patient's admission status was agreed to be Admission Status: observation status to the service of Dr Tonja Limon              Follow-up Information    None         Current Discharge Medication List      CONTINUE these medications which have NOT CHANGED    Details   DULoxetine (CYMBALTA) 60 mg delayed release capsule Take 1 capsule (60 mg total) by mouth daily  Qty: 30 capsule, Refills: 5    Associated Diagnoses: CAR (generalized anxiety disorder)      levofloxacin (LEVAQUIN) 500 mg tablet Take 1 tablet (500 mg total) by mouth daily for 5 days  Qty: 5 tablet, Refills: 0    Associated Diagnoses: Pouchitis (HCC)      metroNIDAZOLE (FLAGYL) 500 mg tablet Take 1 tablet (500 mg total) by mouth every 8 (eight) hours for 5 days  Qty: 15 tablet, Refills: 0    Associated Diagnoses: Pouchitis (HCC)      morphine (MSIR) 15 mg tablet Take 1 tablet (15 mg total) by mouth every 4 (four) hours as needed for severe pain for up to 10 days Max Daily Amount: 90 mg  Qty: 30 tablet, Refills: 0    Associated Diagnoses: Abdominal pain      predniSONE 50 mg tablet Take 1 tablet (50 mg total) by mouth daily for 5 days  Qty: 5 tablet, Refills: 0    Associated Diagnoses: Abdominal pain      sulfaSALAzine (AZULFIDINE) 500 mg tablet Take 2 tablets (1,000 mg total) by mouth 2 (two) times a day  Refills: 0    Associated Diagnoses: Ankylosing spondylitis (HCC)      vancomycin (VANCOCIN) 125 MG capsule Take 1 capsule (125 mg total) by mouth every 12 (twelve) hours for 15 doses  Qty: 15 capsule, Refills: 0    Associated Diagnoses: History of Clostridium difficile infection             No discharge procedures on file      PDMP Review       Value Time User    PDMP Reviewed  Yes 3/3/2022  2:02 PM Maud Dance, DO          ED Provider  Electronically Signed by           Kathya Thomason DO  06/05/22 9220

## 2022-06-05 NOTE — ASSESSMENT & PLAN NOTE
CT scan shows some wall thickening in right abdomen - under distension versus mild colitis  · Patient's pain is in the epigastric region  · Leukocytosis is most likely due to 80 mg of IV Solu-Medrol that he received in the ER on 06/04, possibly reactive in nature    Patient completed his course of Levaquin/Flagyl on 06/04  · Continued patient on IV Cipro and Flagyl while here and transitioned to p o  to complete total of 10 day course from prior admission  · Leukocytosis has resolved

## 2022-06-05 NOTE — ASSESSMENT & PLAN NOTE
Continue vancomycin p o  as prophylaxis which will be continued for 72 hours post completion of antibiotics

## 2022-06-06 ENCOUNTER — APPOINTMENT (OUTPATIENT)
Dept: RADIOLOGY | Facility: HOSPITAL | Age: 27
End: 2022-06-06
Payer: COMMERCIAL

## 2022-06-06 LAB
ALBUMIN SERPL BCP-MCNC: 3 G/DL (ref 3.5–5)
ALP SERPL-CCNC: 64 U/L (ref 46–116)
ALT SERPL W P-5'-P-CCNC: 19 U/L (ref 12–78)
ANION GAP SERPL CALCULATED.3IONS-SCNC: 9 MMOL/L (ref 4–13)
AST SERPL W P-5'-P-CCNC: 12 U/L (ref 5–45)
ATRIAL RATE: 95 BPM
BILIRUB SERPL-MCNC: 0.49 MG/DL (ref 0.2–1)
BUN SERPL-MCNC: 10 MG/DL (ref 5–25)
CALCIUM ALBUM COR SERPL-MCNC: 8.9 MG/DL (ref 8.3–10.1)
CALCIUM SERPL-MCNC: 8.1 MG/DL (ref 8.3–10.1)
CHLORIDE SERPL-SCNC: 103 MMOL/L (ref 100–108)
CO2 SERPL-SCNC: 28 MMOL/L (ref 21–32)
CREAT SERPL-MCNC: 1.02 MG/DL (ref 0.6–1.3)
ERYTHROCYTE [DISTWIDTH] IN BLOOD BY AUTOMATED COUNT: 16.8 % (ref 11.6–15.1)
GFR SERPL CREATININE-BSD FRML MDRD: 98 ML/MIN/1.73SQ M
GLUCOSE SERPL-MCNC: 85 MG/DL (ref 65–140)
HCT VFR BLD AUTO: 32.6 % (ref 36.5–49.3)
HGB BLD-MCNC: 9.6 G/DL (ref 12–17)
MAGNESIUM SERPL-MCNC: 1.8 MG/DL (ref 1.6–2.6)
MCH RBC QN AUTO: 18.9 PG (ref 26.8–34.3)
MCHC RBC AUTO-ENTMCNC: 29.4 G/DL (ref 31.4–37.4)
MCV RBC AUTO: 64 FL (ref 82–98)
P AXIS: 54 DEGREES
PLATELET # BLD AUTO: 373 THOUSANDS/UL (ref 149–390)
PMV BLD AUTO: 8.9 FL (ref 8.9–12.7)
POTASSIUM SERPL-SCNC: 3.4 MMOL/L (ref 3.5–5.3)
PR INTERVAL: 170 MS
PROT SERPL-MCNC: 6 G/DL (ref 6.4–8.2)
QRS AXIS: -4 DEGREES
QRSD INTERVAL: 88 MS
QT INTERVAL: 342 MS
QTC INTERVAL: 429 MS
RBC # BLD AUTO: 5.07 MILLION/UL (ref 3.88–5.62)
SODIUM SERPL-SCNC: 140 MMOL/L (ref 136–145)
T WAVE AXIS: 3 DEGREES
VENTRICULAR RATE: 95 BPM
WBC # BLD AUTO: 8.66 THOUSAND/UL (ref 4.31–10.16)

## 2022-06-06 PROCEDURE — G1004 CDSM NDSC: HCPCS

## 2022-06-06 PROCEDURE — 80053 COMPREHEN METABOLIC PANEL: CPT | Performed by: FAMILY MEDICINE

## 2022-06-06 PROCEDURE — 99223 1ST HOSP IP/OBS HIGH 75: CPT | Performed by: PHYSICIAN ASSISTANT

## 2022-06-06 PROCEDURE — 93010 ELECTROCARDIOGRAM REPORT: CPT | Performed by: INTERNAL MEDICINE

## 2022-06-06 PROCEDURE — 99225 PR SBSQ OBSERVATION CARE/DAY 25 MINUTES: CPT | Performed by: FAMILY MEDICINE

## 2022-06-06 PROCEDURE — 83735 ASSAY OF MAGNESIUM: CPT | Performed by: FAMILY MEDICINE

## 2022-06-06 PROCEDURE — C9113 INJ PANTOPRAZOLE SODIUM, VIA: HCPCS | Performed by: FAMILY MEDICINE

## 2022-06-06 PROCEDURE — 85027 COMPLETE CBC AUTOMATED: CPT | Performed by: FAMILY MEDICINE

## 2022-06-06 PROCEDURE — 71275 CT ANGIOGRAPHY CHEST: CPT

## 2022-06-06 RX ORDER — POTASSIUM CHLORIDE 20 MEQ/1
40 TABLET, EXTENDED RELEASE ORAL ONCE
Status: COMPLETED | OUTPATIENT
Start: 2022-06-06 | End: 2022-06-06

## 2022-06-06 RX ADMIN — CIPROFLOXACIN 400 MG: 2 INJECTION, SOLUTION INTRAVENOUS at 05:17

## 2022-06-06 RX ADMIN — CIPROFLOXACIN 400 MG: 2 INJECTION, SOLUTION INTRAVENOUS at 16:45

## 2022-06-06 RX ADMIN — PANTOPRAZOLE SODIUM 40 MG: 40 INJECTION, POWDER, FOR SOLUTION INTRAVENOUS at 20:21

## 2022-06-06 RX ADMIN — DULOXETINE HYDROCHLORIDE 60 MG: 60 CAPSULE, DELAYED RELEASE ORAL at 08:08

## 2022-06-06 RX ADMIN — METRONIDAZOLE 500 MG: 500 TABLET ORAL at 08:08

## 2022-06-06 RX ADMIN — ONDANSETRON 4 MG: 2 INJECTION INTRAMUSCULAR; INTRAVENOUS at 14:38

## 2022-06-06 RX ADMIN — Medication 125 MG: at 08:08

## 2022-06-06 RX ADMIN — IOHEXOL 70 ML: 350 INJECTION, SOLUTION INTRAVENOUS at 15:44

## 2022-06-06 RX ADMIN — Medication 125 MG: at 20:22

## 2022-06-06 RX ADMIN — HYDROMORPHONE HYDROCHLORIDE 1 MG: 1 INJECTION, SOLUTION INTRAMUSCULAR; INTRAVENOUS; SUBCUTANEOUS at 08:08

## 2022-06-06 RX ADMIN — HYDROMORPHONE HYDROCHLORIDE 1 MG: 1 INJECTION, SOLUTION INTRAMUSCULAR; INTRAVENOUS; SUBCUTANEOUS at 18:38

## 2022-06-06 RX ADMIN — HYDROMORPHONE HYDROCHLORIDE 1 MG: 1 INJECTION, SOLUTION INTRAMUSCULAR; INTRAVENOUS; SUBCUTANEOUS at 22:44

## 2022-06-06 RX ADMIN — ONDANSETRON 4 MG: 2 INJECTION INTRAMUSCULAR; INTRAVENOUS at 08:16

## 2022-06-06 RX ADMIN — SULFASALAZINE 1000 MG: 500 TABLET ORAL at 20:22

## 2022-06-06 RX ADMIN — HYDROMORPHONE HYDROCHLORIDE 1 MG: 1 INJECTION, SOLUTION INTRAMUSCULAR; INTRAVENOUS; SUBCUTANEOUS at 14:38

## 2022-06-06 RX ADMIN — ONDANSETRON 4 MG: 2 INJECTION INTRAMUSCULAR; INTRAVENOUS at 02:16

## 2022-06-06 RX ADMIN — SODIUM CHLORIDE, SODIUM LACTATE, POTASSIUM CHLORIDE, AND CALCIUM CHLORIDE 125 ML/HR: .6; .31; .03; .02 INJECTION, SOLUTION INTRAVENOUS at 14:39

## 2022-06-06 RX ADMIN — POTASSIUM CHLORIDE 40 MEQ: 20 TABLET, EXTENDED RELEASE ORAL at 09:37

## 2022-06-06 RX ADMIN — SODIUM CHLORIDE, SODIUM LACTATE, POTASSIUM CHLORIDE, AND CALCIUM CHLORIDE 125 ML/HR: .6; .31; .03; .02 INJECTION, SOLUTION INTRAVENOUS at 05:20

## 2022-06-06 RX ADMIN — ONDANSETRON 4 MG: 2 INJECTION INTRAMUSCULAR; INTRAVENOUS at 22:46

## 2022-06-06 RX ADMIN — SULFASALAZINE 1000 MG: 500 TABLET ORAL at 08:09

## 2022-06-06 RX ADMIN — HYDROMORPHONE HYDROCHLORIDE 1 MG: 1 INJECTION, SOLUTION INTRAMUSCULAR; INTRAVENOUS; SUBCUTANEOUS at 02:16

## 2022-06-06 RX ADMIN — PANTOPRAZOLE SODIUM 40 MG: 40 INJECTION, POWDER, FOR SOLUTION INTRAVENOUS at 08:08

## 2022-06-06 RX ADMIN — METRONIDAZOLE 500 MG: 500 TABLET ORAL at 16:24

## 2022-06-06 RX ADMIN — METRONIDAZOLE 500 MG: 500 TABLET ORAL at 23:31

## 2022-06-06 NOTE — CASE MANAGEMENT
Case Management Assessment & Discharge Planning Note    Patient name Kei Members  Location [address] MRN [id number]  : 1995 Date 2022       Current Admission Date: 2022  Current Admission Diagnosis:Epigastric abdominal pain   Patient Active Problem List    Diagnosis Date Noted    Rectal obstruction 2022    COVID-19 virus infection 2022    History of Clostridium difficile infection 2022    Epigastric abdominal pain 2022    Abnormal CT scan of lung 2022    Presumed AV endocarditis 2022    Pouchitis ([facility] [address] ) 2022    Anemia 2022    Polymicrobial after streptococcal bacteremia 2022    Enteritis 2022    SIRS (systemic inflammatory response syndrome) ([facility] [address] ) 2022    Sepsis ([facility] [address] ) 2022    Ankylosing spondylitis ([facility] [address] ) 2022    Elevated LFTs 2022    Arm and leg movements, uncontrollable 2021    Seizure-like activity ([facility] [address] ) 2021    Arthralgia 09/15/2020    Sacroiliac joint dysfunction of right side 2020    Left groin pain 2019    Left-sided low back pain without sciatica     Complications of intestinal pouch ([facility] [address] ) 2019    History of ulcerative colitis 2019    Leukocytosis 2019    Hyponatremia 2019    CAR (generalized anxiety disorder) 10/19/2018    BMI 28 0-28 9,adult 10/19/2018    Chronic ulcerative pancolitis ([facility] [address] ) 2018    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[facility] [address] ) 2018    Stricture of rectum 2018      LOS (days): 0  Geometric Mean LOS (GMLOS) (days):   Days to GMLOS:     OBJECTIVE:     Current admission status: Observation     Preferred Pharmacy:   [address] [facility]  [address] [facility] [address]  Phone: [phone] Fax: [phone]    Primary Care Provider: Nadia Oglesby DO    Primary Insurance: BLUE CROSS  Secondary Insurance:     ASSESSMENT:  Active Health Care Proxies     [place], [place] Representative - Father   Primary Phone: [phone] (Mobile)  Home Phone: [phone]            Readmission Root Cause  30 Day Readmission: Yes  Who directed you to return to the hospital?: Self  Did you understand whom to contact if you had questions or problems?: Yes  Did you get your prescriptions before you left the hospital?: No  Reason[de-identified] Not preferred pharmacy  Were you able to get your prescriptions filled when you left the hospital?: Yes  Did you take your medications as prescribed?: Yes  Were you able to get to your follow-up appointments?: No  Reason[de-identified] Readmitted prior to appointment  During previous admission, was a post-acute recommendation made?: No  Patient was readmitted due to: Epigastric Pain  Action Plan: GI consult    Patient Information  Admitted from[de-identified] Home  Mental Status: Alert  During Assessment patient was accompanied by: Not accompanied during assessment  Assessment information provided by[de-identified] Patient  Primary Caregiver: Self  Support Systems: Parent, Family members, Lashell Martinez 61 of Residence: [address] [place] [address] do you live in?: [address] 45 entry access options   Select all that apply : Stairs  Number of steps to enter home : 2  Type of Current Residence: 2 story home  In the last 12 months, was there a time when you were not able to pay the mortgage or rent on time?: No  In the last 12 months, how many places have you lived?: 1  In the last 12 months, was there a time when you did not have a steady place to sleep or slept in a shelter (including now)?: No  Homeless/housing insecurity resource given?: N/A  Living Arrangements: Lives w/ Parent(s)  Is patient a ?: No    Activities of Daily Living Prior to Admission  Functional Status: Independent  Completes ADLs independently?: Yes  Ambulates independently?: Yes  Does patient use assisted devices?: No  Does patient currently own DME?: No  Does patient have a history of Outpatient Therapy (PT/OT)?: No  Does the patient have a history of Short-Term Rehab?: No  Does patient have a history of HHC?: Yes (Hx with [facility] for home infusion)  Does patient currently have [address]?: No     Patient Information Continued  Income Source: Employed  Does patient have prescription coverage?: Yes  Within the past 12 months, you worried that your food would run out before you got the money to buy more : Never true  Within the past 12 months, the food you bought just didn't last and you didn't have money to get more : Never true  Food insecurity resource given?: N/A  Does patient receive dialysis treatments?: No  Does patient have a history of substance abuse?: No  Does patient have a history of Mental Health Diagnosis?: No     Means of Transportation  Means of Transport to Appts[de-identified] Drives Self  In the past 12 months, has lack of transportation kept you from medical appointments or from getting medications?: No  In the past 12 months, has lack of transportation kept you from meetings, work, or from getting things needed for daily living?: No  Was application for public transport provided?: N/A    DISCHARGE DETAILS:    Discharge planning discussed with[de-identified] Patient     Were Treatment Team discharge recommendations reviewed with patient/caregiver?: Yes  Did patient/caregiver verbalize understanding of patient care needs?: Yes  Were patient/caregiver advised of the risks associated with not following Treatment Team discharge recommendations?: Yes    Contacts  Patient Contacts: Ornelas Decent  Relationship to Patient[de-identified] Family  Contact Method: Phone  Phone Number: [phone]  Reason/Outcome: Emergency [address]         Is the patient interested in [address] at discharge?: No    DME Referral Provided  Referral made for DME?: No     Would you like to participate in our [address] service program?  : No - Declined    Treatment Team Recommendation: Home  Discharge Destination Plan[de-identified] Home  Transport at Discharge : Family

## 2022-06-06 NOTE — CONSULTS
Consultation -[facility]   [facility] 32 y o  male MRN: [id number]    Unit/Bed#: [facility] 309-01 Encounter: [id number]      Physician Requesting Consult: Dr Lesa Cunha    Reason for Consult / Principal Problem:  Epigastric pain      HPI:  This is a 32 y o  male who presents with epigastric pain  Patient states normally he has lower abdominal pain but today it is in the epigastric region  No complaints of acid reflux or heart burn  Pain is 8-9/10 in intensity  Reports nausea but no vomiting  Eating food makes the pain worse  Pain it constant in nature and feels similar to when he had pancreatitis  Recently admitted with abdominal pain, rectal obstruction and was discharged home on antibiotics  Underwent pouchoscopy and had dilatation which was just performed on 05/31/2022 and patient did complain of some pain postprocedure in the lower abdomen  Finished antibiotics prior to admission but was but still on oral vancomycin due to history of C diff  Reports some bright red blood in the stool  CT scan on admission had shown wall thickening in the colon in the right abdomen which may be related to under distension versus mild colitis with no evidence of pancreatitis  Lipase on admission was within normal limits  Patient currently reports that pain was definitely similar to pancreatitis and it does not feel typical for acid reflux symptoms  Patient was not taking PPI at home  Reports that he has not had EGD in quite some time  Pain woke him from his sleep  He denies any associated shortness of breath  Troponin high sensitivity was normal on admission  He reports pain was more in the chest area as opposed epigastric and radiates to the back  Pain is now about a 4-5/10  Reports he has been moving his bowels and passing gas  Allergies:    Allergies   Allergen Reactions    Mesalamine GI Intolerance     Other reaction(s): Pancreatitis  Annotation - [id number]: pancreatitis    Cefazolin Hives       Medications:  Current Facility-Administered Medications:     ciprofloxacin (CIPRO) IVPB (premix in 5% dextrose) 400 mg 200 mL, 400 mg, Intravenous, Q12H, Darlin Revankar, DO, Stopped at 06/06/22 0617    DULoxetine (CYMBALTA) delayed release capsule 60 mg, 60 mg, Oral, Daily, Darlin Revankar, DO, 60 mg at 06/06/22 0808    HYDROmorphone (DILAUDID) injection 0 5 mg, 0 5 mg, Intravenous, Q4H PRN **OR** HYDROmorphone (DILAUDID) injection 1 mg, 1 mg, Intravenous, Q4H PRN, CARLITOS ReyezNP, 1 mg at 06/06/22 0808    lactated ringers infusion, 125 mL/hr, Intravenous, Continuous, Darlin Revankar, DO, Last Rate: 125 mL/hr at 06/06/22 0520, 125 mL/hr at 06/06/22 0520    metroNIDAZOLE (FLAGYL) tablet 500 mg, 500 mg, Oral, Q8H KAITLIN, Darlin Revankar, DO, 500 mg at 06/06/22 0808    ondansetron (ZOFRAN) injection 4 mg, 4 mg, Intravenous, Q6H PRN, Darlin Revankar, DO, 4 mg at 06/06/22 0816    pantoprazole (PROTONIX) injection 40 mg, 40 mg, Intravenous, Q12H [address], Darlin Revankar, DO, 40 mg at 06/06/22 0808    sulfaSALAzine (AZULFIDINE) tablet 1,000 mg, 1,000 mg, Oral, BID, Darlin Revankar, DO, 1,000 mg at 06/06/22 0809    vancomycin (VANCOCIN) oral solution 125 mg, 125 mg, Oral, Q12H, Darlin Revankar, DO, 125 mg at 06/06/22 0808    Past Medical history:  Past Medical History:   Diagnosis Date    [facility]ylosing [facility]ondyl[facility] [address] [facility]     Anxiety     [facility]     Clostridium difficile colitis 9/13/2018    Colitis     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 9/13/2018    Pancreatitis     [facility]        Past Surgical History:   Past Surgical History:   Procedure Laterality Date    COLECTOMY TOTAL      with ileal pouch and anastemosis    IR PICC PLACEMENT SINGLE LUMEN  3/1/2022    TOTAL COLECTOMY         Family History: History reviewed  No pertinent family history      Social history:   Social History     Socioeconomic History    Marital status: Single     Spouse name: Not on file    Number of children: Not on file    Years of education: Not on file    Highest education level: Not on file   Occupational History    Not on file   Tobacco Use    Smoking status: Never Smoker    Smokeless tobacco: Never Used   Vaping Use    Vaping Use: Never used   Substance and Sexual Activity    Alcohol use: Yes     Comment: pt states 5 a month/socially    Drug use: No    Sexual activity: Not on file   Other Topics Concern    Not on file   Social History Narrative    Not on file     Social Determinants of Health     Financial Resource Strain: Not on file   Food Insecurity: No Food Insecurity    Worried About Running Out of Food in the Last Year: Never true    Meghan of Food in the Last Year: Never true   Transportation Needs: No Transportation Needs    Lack of Transportation (Medical): No    Lack of Transportation (Non-Medical):  No   Physical Activity: Not on file   Stress: Not on file   Social Connections: Not on file   Intimate Partner Violence: Not on file   Housing Stability: Low Risk     Unable to Pay for Housing in the Last Year: No    Number of Places Lived in the Last Year: 1    Unstable Housing in the Last Year: No       Review of Systems: All other systems were reviewed and were negative, otherwise please refer to HPI    Physical Exam: /55 (BP Location: Right arm)   Pulse 73   Temp 97 8 °F (36 6 °C) (Oral)   Resp 16   Ht 5' 9" (1 753 m)   Wt 82 5 kg (181 lb 14 1 oz)   SpO2 95%   BMI 26 86 kg/m²     General Appearance:    Alert, cooperative, no distress, appears stated age   Head:    Normocephalic, without obvious abnormality, atraumatic   Eyes:    No scleral icterus           Mouth:  Mucosa moist   Neck:   Supple, symmetrical, trachea midline, no thyromegaly       Lungs:     Clear to auscultation bilaterally, respirations unlabored       Heart:    Regular rate and rhythm, S1 and S2 normal, no murmur, rub   or gallop     Abdomen:     Soft, non-tender, bowel sounds active all four quadrants,     Nor/r/g   Genitalia:   deferred   Rectal:   deferred   Extremities:   Extremities normal,no cyanosis or edema       Skin:   Skin color, texture, turgor normal, no rashes or lesions       Neurologic:   Grossly intact, no focal deficit           Lab Results:   Recent Results (from the past 24 hour(s))   ECG 12 lead    Collection Time: 06/05/22  2:36 PM   Result Value Ref Range    Ventricular Rate 95 BPM    Atrial Rate 95 BPM    MO Interval 170 ms    QRSD Interval 88 ms    QT Interval 342 ms    QTC Interval 429 ms    P Reasnor 54 degrees    QRS Axis -4 degrees    T Wave Axis 3 degrees   CBC and differential    Collection Time: 06/05/22  3:00 PM   Result Value Ref Range    WBC 14 58 (H) 4 31 - 10 16 Thousand/uL    RBC 6 02 (H) 3 88 - 5 62 Million/uL    Hemoglobin 11 4 (L) 12 0 - 17 0 g/dL    Hematocrit 38 2 36 5 - 49 3 %    MCV 64 (L) 82 - 98 fL    MCH 18 9 (L) 26 8 - 34 3 pg    MCHC 29 8 (L) 31 4 - 37 4 g/dL    RDW 18 1 (H) 11 6 - 15 1 %    MPV 8 8 (L) 8 9 - 12 7 fL    Platelets 016 (H) 354 - 390 Thousands/uL    nRBC 0 /100 WBCs    Neutrophils Relative 73 43 - 75 %    Immat GRANS % 0 0 - 2 %    Lymphocytes Relative 14 14 - 44 %    Monocytes Relative 11 4 - 12 %    Eosinophils Relative 1 0 - 6 %    Basophils Relative 1 0 - 1 %    Neutrophils Absolute 10 72 (H) 1 85 - 7 62 Thousands/µL    Immature Grans Absolute 0 04 0 00 - 0 20 Thousand/uL    Lymphocytes Absolute 2 05 0 60 - 4 47 Thousands/µL    Monocytes Absolute 1 61 (H) 0 17 - 1 22 Thousand/µL    Eosinophils Absolute 0 09 0 00 - 0 61 Thousand/µL    Basophils Absolute 0 07 0 00 - 0 10 Thousands/µL   Comprehensive metabolic panel    Collection Time: 06/05/22  3:00 PM   Result Value Ref Range    Sodium 142 136 - 145 mmol/L    Potassium 3 8 3 5 - 5 3 mmol/L    Chloride 104 100 - 108 mmol/L    CO2 27 21 - 32 mmol/L    ANION GAP 11 4 - 13 mmol/L    BUN 13 5 - 25 mg/dL    Creatinine 1 02 0 60 - 1 30 mg/dL    Glucose 97 65 - 140 mg/dL    Calcium 8 5 8 3 - 10 1 mg/dL    AST 25 5 - 45 U/L    ALT 25 12 - 78 U/L    Alkaline Phosphatase 77 46 - 116 U/L    Total Protein 7 2 6 4 - 8 2 g/dL    Albumin 3 6 3 5 - 5 0 g/dL    Total Bilirubin 0 36 0 20 - 1 00 mg/dL    eGFR 98 ml/min/1 73sq m   Lipase    Collection Time: 06/05/22  3:00 PM   Result Value Ref Range    Lipase 58 (L) 73 - 393 u/L   Protime-INR    Collection Time: 06/05/22  3:32 PM   Result Value Ref Range    Protime 12 9 11 6 - 14 5 seconds    INR 0 99 0 84 - 1 19   APTT    Collection Time: 06/05/22  3:32 PM   Result Value Ref Range    PTT 28 23 - 37 seconds   Lactic acid    Collection Time: 06/05/22  3:32 PM   Result Value Ref Range    LACTIC ACID 1 0 0 5 - 2 0 mmol/L   HS Troponin 0hr (reflex protocol)    Collection Time: 06/05/22  3:32 PM   Result Value Ref Range    hs TnI 0hr 4 "Refer to ACS Flowchart"- see link ng/L   CBC    Collection Time: 06/05/22  8:15 PM   Result Value Ref Range    WBC 11 68 (H) 4 31 - 10 16 Thousand/uL    RBC 6 08 (H) 3 88 - 5 62 Million/uL    Hemoglobin 11 4 (L) 12 0 - 17 0 g/dL    Hematocrit 39 2 36 5 - 49 3 %    MCV 65 (L) 82 - 98 fL    MCH 18 8 (L) 26 8 - 34 3 pg    MCHC 29 1 (L) 31 4 - 37 4 g/dL    RDW 18 3 (H) 11 6 - 15 1 %    Platelets 601 (H) 573 - 390 Thousands/uL    MPV 8 7 (L) 8 9 - 12 7 fL   HS Troponin I 2hr    Collection Time: 06/05/22  8:17 PM   Result Value Ref Range    hs TnI 2hr 4 "Refer to ACS Flowchart"- see link ng/L    Delta 2hr hsTnI 0 <20 ng/L   Comprehensive metabolic panel    Collection Time: 06/06/22  5:06 AM   Result Value Ref Range    Sodium 140 136 - 145 mmol/L    Potassium 3 4 (L) 3 5 - 5 3 mmol/L    Chloride 103 100 - 108 mmol/L    CO2 28 21 - 32 mmol/L    ANION GAP 9 4 - 13 mmol/L    BUN 10 5 - 25 mg/dL    Creatinine 1 02 0 60 - 1 30 mg/dL    Glucose 85 65 - 140 mg/dL    Calcium 8 1 (L) 8 3 - 10 1 mg/dL    Corrected Calcium 8 9 8 3 - 10 1 mg/dL    AST 12 5 - 45 U/L    ALT 19 12 - 78 U/L    Alkaline Phosphatase 64 46 - 116 U/L    Total Protein 6 0 (L) 6 4 - 8 2 g/dL    Albumin 3 0 (L) 3 5 - 5 0 g/dL    Total Bilirubin 0 49 0 20 - 1 00 mg/dL    eGFR 98 ml/min/1 73sq m   Magnesium    Collection Time: 06/06/22  5:06 AM   Result Value Ref Range    Magnesium 1 8 1 6 - 2 6 mg/dL   CBC (With Platelets)    Collection Time: 06/06/22  5:06 AM   Result Value Ref Range    WBC 8 66 4 31 - 10 16 Thousand/uL    RBC 5 07 3 88 - 5 62 Million/uL    Hemoglobin 9 6 (L) 12 0 - 17 0 g/dL    Hematocrit 32 6 (L) 36 5 - 49 3 %    MCV 64 (L) 82 - 98 fL    MCH 18 9 (L) 26 8 - 34 3 pg    MCHC 29 4 (L) 31 4 - 37 4 g/dL    RDW 16 8 (H) 11 6 - 15 1 %    Platelets 585 712 - 919 Thousands/uL    MPV 8 9 8 9 - 12 7 fL       Imaging Studies: CT abdomen pelvis wo contrast    Result Date: 6/5/2022  Narrative: CT ABDOMEN AND PELVIS WITHOUT IV CONTRAST INDICATION: Epigastric pain  COMPARISON:  6/4/2022 TECHNIQUE:  CT examination of the abdomen and pelvis was performed without intravenous contrast  This examination was performed without intravenous contrast in the context of the critical nationwide Omnipaque shortage  Axial, sagittal, and coronal 2D reformatted images were created from the source data and submitted for interpretation  Radiation dose length product (DLP) for this visit:  470 mGy-cm   This examination, like all CT scans performed in the [facility], was performed utilizing techniques to minimize radiation dose exposure, including the use of iterative reconstruction and automated exposure control  Enteric contrast was not administered  FINDINGS: ABDOMEN LOWER CHEST:  Clear lung bases  LIVER/BILIARY TREE:  Unremarkable  GALLBLADDER:  No calcified gallstones  No pericholecystic inflammatory change  SPLEEN:  Unremarkable  PANCREAS:  Unremarkable  ADRENAL GLANDS:  Unremarkable  KIDNEYS/URETERS:  No nephrolithiasis or obstructive uropathy   STOMACH AND BOWEL: Postsurgical change from partial colectomy mild wall thickening in the colon in the right abdomen may be due to underdistention versus colitis  No evidence of bowel obstruction  APPENDIX:  Surgically absent  ABDOMINOPELVIC CAVITY:  No ascites  No pneumoperitoneum  No lymphadenopathy  VESSELS:  No abdominal aortic aneurysm  PELVIS REPRODUCTIVE ORGANS:  No prostate enlargement  URINARY BLADDER:  Unremarkable  ABDOMINAL WALL/INGUINAL REGIONS:  Unremarkable  OSSEOUS STRUCTURES:  No acute fracture or destructive osseous lesion  Impression: 1  Wall thickening in the colon in the right abdomen may be due to underdistention versus mild colitis  No evidence of bowel obstruction  2   No evidence of pancreatitis  Workstation performed: [id number]     CT abdomen pelvis wo contrast    Result Date: 6/2/2022  Narrative: CT ABDOMEN AND PELVIS WITHOUT IV CONTRAST INDICATION:   follow contrast  COMPARISON:  Earlier on June 2, 2022 at 311 a m  TECHNIQUE:  CT examination of the abdomen and pelvis was performed without intravenous contrast  This examination was performed without intravenous contrast in the context of the critical nationwide Omnipaque shortage  Axial, sagittal, and coronal 2D reformatted images were created from the source data and submitted for interpretation  Radiation dose length product (DLP) for this visit:  475 32 mGy-cm   This examination, like all CT scans performed in the [facility], was performed utilizing techniques to minimize radiation dose exposure, including the use of iterative  reconstruction and automated exposure control  Enteric contrast was administered for the earlier examination  FINDINGS: ABDOMEN LOWER CHEST:  Subtle patchy groundglass densities in the posterior medial left lung base and in the central right lower lobe, most suspicious for an infectious or inflammatory process are reidentified  LIVER/BILIARY TREE:  Unremarkable  GALLBLADDER:  No calcified gallstones  No pericholecystic inflammatory change  SPLEEN:  Unremarkable  PANCREAS:  Unremarkable  ADRENAL GLANDS:  Unremarkable  KIDNEYS/URETERS:  Unremarkable  No hydronephrosis  STOMACH AND BOWEL:  Again noted are postoperative changes of prior total colectomy with an ileal pouch-anal anastomosis  Enteric contrast measures [earlier examination has now passed as far distal as the ileal pouch  Although there are mildly distended  distal small bowel loops, there is no evidence for bowel obstruction  APPENDIX:  Surgically absent  ABDOMINOPELVIC CAVITY:  No ascites  No pneumoperitoneum  There is an unchanged 1 8 x 1 4 pelvic lymph node on image 65 of series 2  VESSELS:  Unremarkable for patient's age  PELVIS REPRODUCTIVE ORGANS:  Unremarkable for patient's age  URINARY BLADDER:  Unremarkable  ABDOMINAL WALL/INGUINAL REGIONS:  Unremarkable  OSSEOUS STRUCTURES:  No acute fracture or destructive osseous lesion  Impression: Enteric contrast administered for examination performed approximately 4 hours earlier has now reached the ileal pouch indicating that there is no bowel obstruction  Reidentification of abnormally enlarged right pelvic node, unchanged in size from recent prior examinations  Subtle patchy groundglass airspace opacities in the lower lung zones suspicious for infectious/inflammatory process  Workstation performed: [id number]     CT abdomen pelvis wo contrast    Result Date: 6/2/2022  Narrative: CT ABDOMEN AND PELVIS WITHOUT IV CONTRAST INDICATION:   Bowel obstruction suspected r/o obstruction  COMPARISON:  CT abdomen/pelvis dated May 30, 2022  TECHNIQUE:  CT examination of the abdomen and pelvis was performed without intravenous contrast  This examination was performed without intravenous contrast in the context of the critical nationwide Omnipaque shortage  Axial, sagittal, and coronal 2D reformatted images were created from the source data and submitted for interpretation  Radiation dose length product (DLP) for this visit:  475 52 mGy-cm     This examination, like all CT scans performed in the [facility], was performed utilizing techniques to minimize radiation dose exposure, including the use of iterative  reconstruction and automated exposure control  Enteric contrast was administered  FINDINGS: ABDOMEN LOWER CHEST:  There a few subtle groundglass densities noted at both lung bases suspicious for an infectious or inflammatory process  LIVER/BILIARY TREE:  Unremarkable  GALLBLADDER:  No calcified gallstones  No pericholecystic inflammatory change  SPLEEN:  Unremarkable  PANCREAS:  Unremarkable  ADRENAL GLANDS:  Unremarkable  KIDNEYS/URETERS:  Unremarkable  No hydronephrosis  STOMACH AND BOWEL:  Again noted are postoperative changes of prior total colectomy with an ileal pouch-anal anastomosis  Although there are a few prominent loops of proximal small bowel, the administered oral contrast extends to the mid to distal small bowel  There is liquid stool-like material noted at the ileal pouch  There is a segment of collapsed distal small bowel with the right lower quadrant and pelvis  APPENDIX:  Surgically absent  ABDOMINOPELVIC CAVITY:  No ascites  No pneumoperitoneum  There is a stable right pelvic iliac lymph node measuring 14 mm in short axis (series 2, image 65)  VESSELS:  Unremarkable for patient's age  PELVIS REPRODUCTIVE ORGANS:  Unremarkable for patient's age  URINARY BLADDER:  Unremarkable  ABDOMINAL WALL/INGUINAL REGIONS:  Unremarkable  OSSEOUS STRUCTURES:  No acute fracture or destructive osseous lesion  Impression: Reidentified postoperative changes of prior total colectomy with ileal pouch and anastomosis  There are a few prominent, borderline dilated loops of proximal small bowel  However, the administered oral contrast extends to the mid to distal small bowel which are of normal caliber  There is no obvious high-grade obstruction    A repeat delayed CT abdomen/pelvis in 4 hours could be performed to assess for further transit of the administered oral contrast  A few subtle groundglass densities are noted at both visualized lung bases suspicious for an infectious or inflammatory process  No free air or free fluid  Stable mild pelvic lymphadenopathy  Workstation performed: [id number]     CT abdomen pelvis wo contrast    Result Date: 5/30/2022  Narrative: CT ABDOMEN AND PELVIS WITHOUT IV CONTRAST INDICATION:   Abdominal pain, acute, nonlocalized abdominal pain  History of ulcerative colitis status post complete colectomy  J-pouch ileoanal anastomosis with history of chronic recurrent strictures  COMPARISON:  May 14, 2022 TECHNIQUE:  CT examination of the abdomen and pelvis was performed without intravenous contrast  This examination was performed without intravenous contrast in the context of the critical nationwide Omnipaque shortage  Axial, sagittal, and coronal 2D reformatted images were created from the source data and submitted for interpretation  Radiation dose length product (DLP) for this visit:  551 mGy-cm   This examination, like all CT scans performed in the [facility], was performed utilizing techniques to minimize radiation dose exposure, including the use of iterative reconstruction and automated exposure control  Enteric contrast was not administered  FINDINGS: ABDOMEN LOWER CHEST:  No clinically significant abnormality identified in the visualized lower chest  LIVER/BILIARY TREE:  Unremarkable  GALLBLADDER:  No calcified gallstones  No pericholecystic inflammatory change  SPLEEN:  Unremarkable  PANCREAS:  Unremarkable  ADRENAL GLANDS:  Unremarkable  KIDNEYS/URETERS:  Unremarkable  No hydronephrosis  STOMACH AND BOWEL:  Limited evaluation of GI tract without oral contrast   Patient is status post total colectomy and J-pouch ileoanal anastomosis  Gaseous distention of ileal small bowel up to 8 cm with large volume of formed stool above the ileoanal anastomosis which appears narrowed   Dilatation of a distal jejunal/proximal ileal loop up to 6 cm containing gas and partially formed stool  A segment of small bowel in the right upper pelvis demonstrate circumferential wall thickening averaging 4 mm without pneumatosis or surrounding inflammation  The more proximal small bowel averages up to 2 cm diameter containing gas and fluid  Stomach is partially distended  APPENDIX:  Removed ABDOMINOPELVIC CAVITY:  No ascites  No pneumoperitoneum  Right pelvic iliac node measures 1 8 x 1 4 cm, previously 1 8 x 1 4 cm  VESSELS:  Unremarkable for patient's age  PELVIS REPRODUCTIVE ORGANS:  Unremarkable for patient's age  URINARY BLADDER:  Unremarkable  ABDOMINAL WALL/INGUINAL REGIONS:  Small fat-containing inguinal hernias  Stable Postop changes right anterior abdominal wall  OSSEOUS STRUCTURES:  No acute fracture or destructive osseous lesion  Impression: Findings compatible with at least partial small bowel obstruction at the level of the ileoanal anastomosis causing dilatation of distal small bowel up to 8 cm and retention of large volumes of gas and stool  Circumferential Inflammation involving distal jejunum/proximal ileum in the right lower abdomen contributing to a 2nd transition point is also partially obstructing approximately 4 cm beyond the small bowel-small bowel anastomotic line in the right mid to upper abdomen  Stable pelvic adenopathy  Findings are consistent with the preliminary report from [facility] which was provided shortly after completion of the exam   Workstation performed: [id number]     CT abdomen pelvis wo contrast    Result Date: 5/14/2022  Narrative: CT ABDOMEN AND PELVIS WITHOUT IV CONTRAST INDICATION:   r/o complication prior colectomy / revision     vs UC complication  COMPARISON:  None  TECHNIQUE:  CT examination of the abdomen and pelvis was performed without intravenous contrast  This examination was performed without intravenous contrast in the context of the critical nationwide Omnipaque shortage   Axial, sagittal, and coronal 2D reformatted images were created from the source data and submitted for interpretation  Radiation dose length product (DLP) for this visit:  493 mGy-cm   This examination, like all CT scans performed in the [facility], was performed utilizing techniques to minimize radiation dose exposure, including the use of iterative reconstruction and automated exposure control  Enteric contrast was administered  FINDINGS: ABDOMEN LOWER CHEST:  No clinically significant abnormality identified in the visualized lower chest  LIVER/BILIARY TREE:  Unremarkable  GALLBLADDER:  No calcified gallstones  No pericholecystic inflammatory change  SPLEEN:  Unremarkable  PANCREAS:  Unremarkable  ADRENAL GLANDS:  Unremarkable  KIDNEYS/URETERS:  Unremarkable  No hydronephrosis  STOMACH AND BOWEL:  Unremarkable  APPENDIX:  Status post colectomy without obvious complication  No surrounding fluid collection    ABDOMINOPELVIC CAVITY:  No ascites  No pneumoperitoneum  1 3 cm midline pelvic and 1 4 cm right iliac chain lymph nodes are stable (series 2, image 61 and 65)    VESSELS:  Unremarkable for patient's age  PELVIS REPRODUCTIVE ORGANS:  Unremarkable for patient's age  URINARY BLADDER:  Unremarkable  ABDOMINAL WALL/INGUINAL REGIONS:  Unremarkable  OSSEOUS STRUCTURES:  No acute fracture or destructive osseous lesion  Impression: Unremarkable appearance following prior colectomy  No evidence of obstruction or large intra-abdominal collection  Workstation performed: [id number]     XR abdomen 1 view kub    Result Date: 6/4/2022  Narrative: ABDOMEN INDICATION:   eval contrast location  COMPARISON:  CT and acute obstruction series 6/2/2022 VIEWS:  AP supine FINDINGS: Dilute contrast seen in the lower right abdomen into the midline pelvis in area of reported ileoanal anastomosis  Multiple dilated small bowel loops with lower right abdomen and upper midabdomen markedly dilated loops up to 11 cm  No discernible free air on this supine study  Upright or left lateral decubitus imaging is more sensitive to detect subtle free air in the appropriate setting  No pathologic calcifications or soft tissue masses  Visualized lung bases are clear  Visualized osseous structures are unremarkable for the patient's age  Impression: Diluted contrast midline pelvis in the area of reported ileal anal anastomosis  Dilated bowel loops greatest upper abdomen; 11 cm Workstation performed: [id number]     XR abdomen obstruction series    Result Date: 6/2/2022  Narrative: OBSTRUCTION SERIES INDICATION:   pain  COMPARISON:  CT abdomen pelvis 5/30/2022 EXAM PERFORMED/VIEWS:  XR ABDOMEN OBSTRUCTION SERIES FINDINGS: Patient is status post total colectomy and J-pouch ileoanal anastomosis  Persistent distended small bowel loops  No free air beneath the hemidiaphragms  No pathologic calcifications or soft tissue masses evident  Osseous structures are unremarkable  Examination of the chest reveals a normal cardiomediastinal silhouette  Lungs are clear  Impression: Persistent distended small bowel loops, status post total colectomy and J-pouch ileoanal anastomosis  This is better assessed in subsequent CT abdomen/pelvis  Workstation performed: [id number]     CT renal stone study abdomen pelvis wo contrast    Result Date: 6/4/2022  Narrative: CT ABDOMEN AND PELVIS WITHOUT IV CONTRAST - LOW DOSE RENAL STONE INDICATION:   eval contrast transit  Abdominal pain  Pt stating he feels like he is obstructing again,trying to get ahead of it  Pt feeling burning sensation, last bm 2200 6/3 The patient's entire past medical history was obtained directly from either the attending emergency room physicians notes and/or the resident/physician's assistant notes in [address] [place] [address]  The information was copied and pasted directly from [facility]  Patient has confirmed COVID-19  Patient tested positive for Covid 19 on May 30, 2022   COMPARISON:  Numerous prior CT examinations, most recent of which is dated June 2, 2022  TECHNIQUE:  Low radiation dose thin section CT examination of the abdomen and pelvis was performed without intravenous or oral contrast according to a protocol specifically designed to evaluate for urinary tract calculus  Axial, sagittal, and coronal 2D  reformatted images were created from the source data and submitted for interpretation  Evaluation for pathology in the abdomen and pelvis that is unrelated to urinary tract calculi is limited  Radiation dose length product (DLP) for this visit:  212 mGy-cm   This examination, like all CT scans performed in the [facility], was performed utilizing techniques to minimize radiation dose exposure, including the use of iterative reconstruction and automated exposure control  URINARY TRACT FINDINGS: RIGHT KIDNEY AND URETER:  No urinary tract calculi  No hydronephrosis or hydroureter  LEFT KIDNEY AND URETER:  No urinary tract calculi  No hydronephrosis or hydroureter  URINARY BLADDER:  Decompressed  Inadequately evaluated  ADDITIONAL FINDINGS: LOWER CHEST:  Interval development of nodular groundglass infiltrates in the lower lobes of the lungs bilaterally  SOLID VISCERA: Limited low radiation dose noncontrast CT evaluation demonstrates no clinically significant abnormality of the imaged portions of the liver, spleen, pancreas, or adrenal glands  GALLBLADDER/BILIARY TREE:  No calcified gallstones  No pericholecystic inflammatory change  No biliary dilatation  STOMACH AND BOWEL:  The stomach is distended with ingested food products and air  Hiatal hernia  The proximal small bowel is unopacified  The mid small bowel is segmentally distended with oral contrast material  The distal small bowel is relatively decompressed and inadequately evaluated  There has been total colectomy  There is an ileal pouch with ileoanal anastomosis  The pouch is incompletely distended with residual oral contrast material and air    APPENDIX:  Surgically absent  ABDOMINOPELVIC CAVITY:  No ascites  No pneumoperitoneum  No lymphadenopathy  Stable enlarged right pelvic lymph node (series 2, image 128)  REPRODUCTIVE ORGANS:  Unremarkable for patient's age  ABDOMINAL WALL/INGUINAL REGIONS:  Unremarkable  OSSEOUS STRUCTURES:  No acute fracture or destructive osseous lesion  Impression: 1  Limited examination due to lack of intravenous contrast material as well as segmental visualization of the small bowel  2   No evidence of small bowel obstruction, however the contrast material has not reached the distal small bowel, ileal pouch or ileoanal anastomosis  Oral contrast material in the ileal pouch and ileoanal anastomosis, secondary to retained oral contrast material from earlier CT abdomen pelvis  The ileal pouch is incompletely distended with oral contrast material and air and inadequately evaluated  Given the recurrence of the patient's symptoms, recommend follow-up GI consultation and MR enterography to exclude abnormal loops of small bowel  If there is concern for ileal pouchitis, recommend follow-up GI consultation  Workstation performed: [id number]     Flexible Sigmoidoscopy    Result Date: 5/31/2022  Narrative: [address] [place] [address] Operating Room [address] [place] [address] [phone] DATE OF SERVICE: 5/31/22 PHYSICIAN(S): Attending: Addison Price MD Fellow: No Staff Documented INDICATION: Rectal obstruction POST-OP DIAGNOSIS: See the impression below  HISTORY: Prior colonoscopy: Less than 3 years ago  It is being repeated at an interval of less than 3 years because: This colonoscopy is being performed for a diagnostic indication BOWEL PREPARATION:  PREPROCEDURE: Informed consent was obtained for the procedure, including sedation  Risks including but not limited to bleeding, infection, perforation, adverse drug reaction and aspiration were explained in detail   Also explained about less than 100% sensitivity with the exam and other alternatives  The patient was placed in the left lateral decubitus position  DETAILS OF PROCEDURE: Patient was taken to the procedure room where a time out was performed to confirm correct patient and correct procedure  The patient underwent monitored anesthesia care, which was administered by an anesthesia professional  The patient's blood pressure, heart rate, level of consciousness, oxygen and respirations were monitored throughout the procedure  A digital rectal exam was performed  The scope was introduced through the anus and advanced to the terminal ileum  Retroflexion was performed in the rectum  The patient experienced no blood loss  The procedure was not difficult  The patient tolerated the procedure well  There were no apparent complications  ANESTHESIA INFORMATION: ASA: II Anesthesia Type: IV Sedation with Anesthesia MEDICATIONS: No administrations occurring from 1346 to 1421 on 05/31/22 FINDINGS: Mild, patchy erythematous mucosa; performed cold forceps biopsy  Ileoanal anastomosis was seen, small bowel had some erythema punctate ulcers, biopsied  Ileoanal anastomosis was is open but about 16 to18 mm in caliber  This was dilated with 20 mm balloon for 30 seconds  EVENTS: Procedure Events Event Event Time ENDO SCOPE OUT TIME 5/31/2022  2:04 PM SPECIMENS: ID Type Source Tests Collected by Time Destination 1 : bx-Ileum pouch- ulcerative colitis Tissue Small Bowel, NOS TISSUE EXAM Tomasa Barros MD 5/31/2022  2:05 PM  EQUIPMENT: Colonoscope -     Impression: Ulcerated area distal small bowel with yellowish exudate, biopsied  Ileoanal stricture was dilated to 20 mm with balloon RECOMMENDATION: There is no recommended follow-up for this procedure  Patient will follow-up with his IBD doctor at [facility]  Tomasa Barros MD       Assessment/Plan: This is a 78-year-old male who presents with epigastric/chest pain with radiation to the back    Patient reports that the pain woke him up out of his sleep and felt typical of a pancreatitis attack although no evidence of pancreatitis was visualized on CT  Patient does have history of ulcerative colitis and does have COVID infection currently and is at risk of pulmonary embolism but currently has normal O2 saturations but will order CTA for further evaluation  Also will order ultrasound of right upper quadrant to rule out any biliary pathology  LFTs are normal as well as lipase  Would continue PPI although patient states that this is not feel typical of reflux symptoms for him  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

## 2022-06-06 NOTE — UTILIZATION REVIEW
Initial Clinical Review    Admission: Date/Time/Statement:   Admission Orders (From admission, onward)     Ordered        06/05/22 1627  Place in Observation  Once                      Orders Placed This Encounter   Procedures    Place in Observation     Standing Status:   Standing     Number of Occurrences:   1     Order Specific Question:   Level of Care     Answer:   Med Surg [16]     ED Arrival Information     Expected   -    Arrival   6/5/2022 14:19    Acuity   Urgent            Means of arrival   Walk-In    Escorted by   Self    Service   Hospitalist    Admission type   Urgent            Arrival complaint   chest pain           Chief Complaint   Patient presents with    Abdominal Pain     Pt reports upper abdominal pain since 10 am       Initial Presentation:   64-year-old male presents the ED complaining of epigastric discomfort stating going up in his chest a little bit  Patient states that he does have a history of pancreatitis in the past as well as bowel obstruction and feels it feels little more like pancreatitis again  Has some nausea no vomiting no diarrhea states the pain is 10/10 at this point  Epigastric abdominal pain  Assessment & Plan  Patient was recently admitted from 05/30-6/1 with abdominal pain, rectal obstruction    Underwent pouchoscopy and was treated with IV antibiotics  · Seen in the ER twice since then and was discharged home  · Today reports epigastric pain with radiation to the back which felt the same as when he last had pancreatitis  · Lipase level 58 with no pancreatitis on imaging  · Supportive treatment with NPO, IV fluids, pain medications  · GI consult for further management    EGD 6/7=  Normal EGD  Antral biopsies obtained to evaluate for H pylori    ED Triage Vitals   Temperature Pulse Respirations Blood Pressure SpO2   06/05/22 1931 06/05/22 1452 06/05/22 1452 06/05/22 1452 06/05/22 1452   98 9 °F (37 2 °C) 97 20 130/95 98 %      Temp Source Heart Rate Source Patient Position - Orthostatic VS BP Location FiO2 (%)   06/05/22 1931 06/05/22 1452 06/05/22 1452 06/05/22 1452 --   Oral Monitor Lying Right arm       Pain Score       06/05/22 1452       8          Wt Readings from Last 1 Encounters:   06/05/22 82 5 kg (181 lb 14 1 oz)     Additional Vital Signs:   Date/Time Temp Pulse Resp BP MAP (mmHg) SpO2 O2 Device Patient Position - Orthostatic VS   06/05/22 2317 98 2 °F (36 8 °C) 76 18 129/75 94 100 % None (Room air) Lying   06/05/22 1931 98 9 °F (37 2 °C) 93 19 138/92 110 100 % None (Room air) Lying   06/05/22 1452 -- 97 20 130/95 -- 98 % None (Room air) Lying       Pertinent Labs/Diagnostic Test Results:   CT abdomen pelvis wo contrast   Final Result by Natanael Ferro MD (06/05 1612)         1  Wall thickening in the colon in the right abdomen may be due to underdistention versus mild colitis  No evidence of bowel obstruction  2   No evidence of pancreatitis              Workstation performed: [id number]               Results from last 7 days   Lab Units 06/06/22  0506 06/05/22 2015 06/05/22  1500 06/04/22  0453 06/02/22  0026   WBC Thousand/uL 8 66 11 68* 14 58* 9 91 9 85   HEMOGLOBIN g/dL 9 6* 11 4* 11 4* 11 9* 11 3*   HEMATOCRIT % 32 6* 39 2 38 2 39 4 38 2   PLATELETS Thousands/uL 373 449* 478* 521* 436*   NEUTROS ABS Thousands/µL  --   --  10 72* 6 83 8 04*         Results from last 7 days   Lab Units 06/06/22  0506 06/05/22  1500 06/04/22  0453 06/02/22  0026 06/01/22  0443   SODIUM mmol/L 140 142 142 139 138   POTASSIUM mmol/L 3 4* 3 8 3 6 3 5 4 0   CHLORIDE mmol/L 103 104 104 101 103   CO2 mmol/L 28 27 27 30 29   ANION GAP mmol/L 9 11 11 8 6   BUN mg/dL 10 13 11 7 8   CREATININE mg/dL 1 02 1 02 1 13 1 35* 1 05   EGFR ml/min/1 73sq m 98 98 87 70 95   CALCIUM mg/dL 8 1* 8 5 9 4 9 2 8 5   MAGNESIUM mg/dL 1 8  --   --  2 0  --      Results from last 7 days   Lab Units 06/06/22  0506 06/05/22  1500 06/04/22  0453 06/02/22  0026   AST U/L 12 25 14 14   ALT U/L 19 25 28 30   ALK PHOS U/L 64 77 80 87   TOTAL PROTEIN g/dL 6 0* 7 2 7 8 7 7   ALBUMIN g/dL 3 0* 3 6 3 8 3 8   TOTAL BILIRUBIN mg/dL 0 49 0 36 0 41 0 32         Results from last 7 days   Lab Units 06/06/22  0506 06/05/22  1500 06/04/22  0453 06/02/22  0026 06/01/22  0443   GLUCOSE RANDOM mg/dL 85 97 98 109 67             No results found for: BETA-HYDROXYBUTYRATE                   Results from last 7 days   Lab Units 06/05/22  2017 06/05/22  1532   HS TNI 0HR ng/L  --  4   HS TNI 2HR ng/L 4  --    HSTNI D2 ng/L 0  --          Results from last 7 days   Lab Units 06/05/22  1532   PROTIME seconds 12 9   INR  0 99   PTT seconds 28             Results from last 7 days   Lab Units 06/05/22  1532 06/02/22  0026   LACTIC ACID mmol/L 1 0 1 9                         Results from last 7 days   Lab Units 06/05/22  1500 06/04/22  0453 06/02/22  0026   LIPASE u/L 58* 122 94     Results from last 7 days   Lab Units 06/04/22  0458   CRP mg/L 28 4*   SED RATE mm/hour 37*                                                           ED Treatment:   Medication Administration from 06/05/2022 1419 to 06/05/2022 1723       Date/Time Order Dose Route Action     06/05/2022 1456 sodium chloride 0 9 % bolus 1,000 mL 1,000 mL Intravenous New Bag     06/05/2022 1453 HYDROmorphone (DILAUDID) injection 1 mg 1 mg Intravenous Given     06/05/2022 1454 ondansetron (ZOFRAN) injection 4 mg 4 mg Intravenous Given     06/05/2022 1543 morphine (PF) 4 mg/mL injection 4 mg 4 mg Intravenous Given     06/05/2022 1609 morphine (PF) 4 mg/mL injection 4 mg 4 mg Intravenous Given     06/05/2022 1702 ciprofloxacin (CIPRO) IVPB (premix in 5% dextrose) 400 mg 200 mL 400 mg Intravenous New Bag     06/05/2022 1703 metroNIDAZOLE (FLAGYL) tablet 500 mg 500 mg Oral Given     06/05/2022 1701 morphine (PF) 4 mg/mL injection 4 mg 4 mg Intravenous Given     06/05/2022 1705 HYDROmorphone (DILAUDID) injection 1 mg 1 mg Intravenous Given        Past Medical History:   Diagnosis Date    Ankylosing spondylitis (HCC)     Anxiety     Bowel obstruction (HCC)     Clostridium difficile colitis 9/13/2018    Colitis     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[facility] ) 9/13/2018    Pancreatitis     Ulcerative colitis ([facility] )      Present on Admission:   Epigastric abdominal pain   COVID-19 virus infection   CAR (generalized anxiety disorder)   Ankylosing spondylitis (HCC)   History of Clostridium difficile infection      Admitting Diagnosis: Colitis [K52 9]  Abdominal pain [R10 9]  Generalized abdominal pain [R10 84]  Age/Sex: 32 y o  male  Admission Orders:  Cont pulse ox  Scd/foot pumps  Consult GI  NPO    Scheduled Medications:  ciprofloxacin, 400 mg, Intravenous, Q12H  DULoxetine, 60 mg, Oral, Daily  metroNIDAZOLE, 500 mg, Oral, Q8H KAITLIN  pantoprazole, 40 mg, Intravenous, Q12H KAITLIN  sulfaSALAzine, 1,000 mg, Oral, BID  vancomycin, 125 mg, Oral, Q12H    Continuous IV Infusions:  lactated ringers, 125 mL/hr, Intravenous, Continuous    PRN Meds:  HYDROmorphone, 0 5 mg, Intravenous, Q4H PRN   Or  HYDROmorphone, 1 mg, Intravenous, Q4H PRN  ondansetron, 4 mg, Intravenous, Q6H PRN    Network Utilization Review Department  ATTENTION: Please call with any questions or concerns to [phone] and carefully listen to the prompts so that you are directed to the right person  All voicemails are confidential   Adrian Vazquez all requests for admission clinical reviews, approved or denied determinations and any other requests to dedicated fax number below belonging to the campus where the patient is receiving treatment   List of dedicated fax numbers for the Facilities:  [address] DENIALS (Administrative/Medical Necessity) [phone]   [address] (Maternity/NICU/Pediatrics) [phone]   [address] Liz Harrison   [facility] [address] [place] [address] [address] [address] [place] [address] [address] [place] [address] [phone]

## 2022-06-06 NOTE — PROGRESS NOTES
[address] Internal Medicine Progress Note  Patient: Eliza Guzman 32 y o  male   MRN: [id number]  PCP: Homero Carlin DO  Unit/Bed#: [address] [place] [address] Encounter: [id number]  Date Of Visit: 06/06/22    Problem List:    Principal Problem:    Epigastric abdominal pain  Active Problems:    Leukocytosis    COVID-19 virus infection    History of ulcerative colitis    CAR (generalized anxiety disorder)    Ankylosing spondylitis ([address] )    History of Clostridium difficile infection      Assessment & Plan:    * Epigastric abdominal pain  Assessment & Plan  Patient was recently admitted from 05/30-6/1 with abdominal pain, rectal obstruction  Underwent pouchoscopy and was treated with IV antibiotics  · Seen in the ER twice since then and was discharged home  · On day of admission reports epigastric pain with radiation to the back which felt the same as when he last had pancreatitis  · Lipase level 58 with no pancreatitis on imaging  · Supportive treatment with IV fluids, pain medications  Started on clears  · Patient reports epigastric/chest pain  CTA done which ruled out PE  Plan for right upper quadrant ultrasound tomorrow  If unrevealing will plan for EGD tomorrow as well  · GI input appreciated    Leukocytosis  Assessment & Plan  CT scan shows some wall thickening in right abdomen - under distension versus mild colitis  · Patient's pain is in the epigastric region  · Leukocytosis is most likely due to 80 mg of IV Solu-Medrol that he received in the ER on 06/04, possibly reactive in nature  Patient completed his course of Levaquin/Flagyl on 06/04  · For now, continue patient on IV Cipro and Flagyl to complete total of 10 day course from prior admission  · Leukocytosis has resolved    COVID-19 virus infection  Assessment & Plan  Tested positive for COVID on 05/30  Received COVID vaccine but no booster  Asymptomatic      History of ulcerative colitis  Assessment & Plan  Patient is status post partial colectomy and ileal pouch formation with stricture of rectum  Has an appointment coming up with his surgeon     History of Clostridium difficile infection  Assessment & Plan  Continue vancomycin p o  as prophylaxis    Ankylosing spondylitis (HCC)  Assessment & Plan  Continue sulfasalazine    CAR (generalized anxiety disorder)  Assessment & Plan  Continue Cymbalta            VTE Pharmacologic Prophylaxis: VTE Score: 1 Blood in stool    Patient Centered Rounds: I performed bedside rounds with nursing staff today  Discussions with Specialists or Other Care Team Provider: yes - GI    Education and Discussions with Family / Patient: yes    Time Spent for Care: 30 minutes  More than 50% of total time spent on counseling and coordination of care as described above  Current Length of Stay: 0 day(s)  Current Patient Status: Observation   Certification Statement: The patient will continue to require additional inpatient hospital stay due to Epigastric pain requiring right upper quadrant ultrasound and possible EGD tomorrow  Discharge Plan: Anticipate discharge tomorrow to home  Code Status: Level 1 - Full Code    Subjective:     Patient reports some improvement in epigastric pain  States pain is epigastric/chest area    Objective:     Vitals:   Temp (24hrs), Av 2 °F (36 8 °C), Min:97 8 °F (36 6 °C), Max:98 9 °F (37 2 °C)    Temp:  [97 8 °F (36 6 °C)-98 9 °F (37 2 °C)] 97 9 °F (36 6 °C)  HR:  [64-93] 64  Resp:  [16-19] 16  BP: (104-138)/(55-92) 117/74  SpO2:  [95 %-100 %] 96 %  Body mass index is 26 86 kg/m²  Input and Output Summary (last 24 hours): Intake/Output Summary (Last 24 hours) at 2022 1650  Last data filed at 2022 1001  Gross per 24 hour   Intake 1401 67 ml   Output --   Net 1401 67 ml       Physical Exam:   Physical Exam  Constitutional:       General: He is not in acute distress  Appearance: He is not diaphoretic  HENT:      Head: Normocephalic and atraumatic     Eyes:      General:         Right eye: No discharge  Left eye: No discharge  Cardiovascular:      Rate and Rhythm: Normal rate and regular rhythm  Pulmonary:      Effort: Pulmonary effort is normal  No respiratory distress  Breath sounds: Normal breath sounds  No wheezing or rales  Abdominal:      General: Bowel sounds are normal  There is no distension  Palpations: Abdomen is soft  Tenderness: There is abdominal tenderness (Mild generalized)  There is no guarding  Neurological:      Mental Status: He is alert and oriented to person, place, and time  Additional Data:     Labs:  Results from last 7 days   Lab Units 06/06/22  0506 06/05/22 2015 06/05/22  1500   WBC Thousand/uL 8 66   < > 14 58*   HEMOGLOBIN g/dL 9 6*   < > 11 4*   HEMATOCRIT % 32 6*   < > 38 2   PLATELETS Thousands/uL 373   < > 478*   NEUTROS PCT %  --   --  73   LYMPHS PCT %  --   --  14   MONOS PCT %  --   --  11   EOS PCT %  --   --  1    < > = values in this interval not displayed       Results from last 7 days   Lab Units 06/06/22  0506   SODIUM mmol/L 140   POTASSIUM mmol/L 3 4*   CHLORIDE mmol/L 103   CO2 mmol/L 28   BUN mg/dL 10   CREATININE mg/dL 1 02   ANION GAP mmol/L 9   CALCIUM mg/dL 8 1*   ALBUMIN g/dL 3 0*   TOTAL BILIRUBIN mg/dL 0 49   ALK PHOS U/L 64   ALT U/L 19   AST U/L 12   GLUCOSE RANDOM mg/dL 85     Results from last 7 days   Lab Units 06/05/22  1532   INR  0 99             Results from last 7 days   Lab Units 06/05/22  1532 06/02/22  0026   LACTIC ACID mmol/L 1 0 1 9       Lines/Drains:  Invasive Devices  Report    Peripheral Intravenous Line  Duration           Peripheral IV 06/06/22 Right Antecubital <1 day                      Imaging: Reviewed radiology reports from this admission including: chest CT scan and abdominal/pelvic CT    Recent Cultures (last 7 days):         Last 24 Hours Medication List:   Current Facility-Administered Medications   Medication Dose Route Frequency Provider Last Rate    ciprofloxacin  400 mg Intravenous Q12H Darlin Revankar,  mg (06/06/22 1645)    DULoxetine  60 mg Oral Daily Darlin Revankar, DO      HYDROmorphone  0 5 mg Intravenous Q4H PRN Cuiyin Yurik, CRNP      Or    HYDROmorphone  1 mg Intravenous Q4H PRN Cuiyin Yurik, CRNP      lactated ringers  125 mL/hr Intravenous Continuous Darlin Revankar,  mL/hr (06/06/22 1439)    metroNIDAZOLE  500 mg Oral Q8H [address] Darlin Revankar, DO      ondansetron  4 mg Intravenous Q6H PRN Darlin Revankar, DO      pantoprazole  40 mg Intravenous Q12H [address] Darlin Revankar, DO      sulfaSALAzine  1,000 mg Oral BID Darlin Revankar, DO      vancomycin  125 mg Oral Q12H Darlin Revankar, DO          Today, Patient Was Seen By: Martha Browning DO    ** Please Note: "This note has been constructed using a voice recognition system  Therefore there may be syntax, spelling, and/or grammatical errors   Please call if you have any questions  "**

## 2022-06-06 NOTE — PLAN OF CARE
Problem: PAIN - ADULT  Goal: Verbalizes/displays adequate comfort level or baseline comfort level  Description: Interventions:  - Encourage patient to monitor pain and request assistance  - Assess pain using appropriate pain scale  - Administer analgesics based on type and severity of pain and evaluate response  - Implement non-pharmacological measures as appropriate and evaluate response  - Consider cultural and social influences on pain and pain management  - Notify physician/advanced practitioner if interventions unsuccessful or patient reports new pain  Outcome: Progressing     Problem: METABOLIC, FLUID AND ELECTROLYTES - ADULT  Goal: Electrolytes maintained within normal limits  Description: INTERVENTIONS:  - Monitor labs and assess patient for signs and symptoms of electrolyte imbalances  - Administer electrolyte replacement as ordered  - Monitor response to electrolyte replacements, including repeat lab results as appropriate  - Instruct patient on fluid and nutrition as appropriate  Outcome: Progressing     Problem: GASTROINTESTINAL - ADULT  Goal: Minimal or absence of nausea and/or vomiting  Description: INTERVENTIONS:  - Administer IV fluids if ordered to ensure adequate hydration  - Maintain NPO status until nausea and vomiting are resolved  - Nasogastric tube if ordered  - Administer ordered antiemetic medications as needed  - Provide nonpharmacologic comfort measures as appropriate  - Advance diet as tolerated, if ordered  - Consider nutrition services referral to assist patient with adequate nutrition and appropriate food choices  Outcome: Progressing  Goal: Maintains or returns to baseline bowel function  Description: INTERVENTIONS:  - Assess bowel function  - Encourage oral fluids to ensure adequate hydration  - Administer IV fluids if ordered to ensure adequate hydration  - Administer ordered medications as needed  - Encourage mobilization and activity  - Consider nutritional services referral to assist patient with adequate nutrition and appropriate food choices  Outcome: Progressing

## 2022-06-06 NOTE — PLAN OF CARE
Problem: PAIN - ADULT  Goal: Verbalizes/displays adequate comfort level or baseline comfort level  Description: Interventions:  - Encourage patient to monitor pain and request assistance  - Assess pain using appropriate pain scale  - Administer analgesics based on type and severity of pain and evaluate response  - Implement non-pharmacological measures as appropriate and evaluate response  - Consider cultural and social influences on pain and pain management  - Notify physician/advanced practitioner if interventions unsuccessful or patient reports new pain  Outcome: Progressing     Problem: GASTROINTESTINAL - ADULT  Goal: Minimal or absence of nausea and/or vomiting  Description: INTERVENTIONS:  - Administer IV fluids if ordered to ensure adequate hydration  - Maintain NPO status until nausea and vomiting are resolved  - Nasogastric tube if ordered  - Administer ordered antiemetic medications as needed  - Provide nonpharmacologic comfort measures as appropriate  - Advance diet as tolerated, if ordered  - Consider nutrition services referral to assist patient with adequate nutrition and appropriate food choices  Outcome: Progressing     Problem: GASTROINTESTINAL - ADULT  Goal: Maintains or returns to baseline bowel function  Description: INTERVENTIONS:  - Assess bowel function  - Encourage oral fluids to ensure adequate hydration  - Administer IV fluids if ordered to ensure adequate hydration  - Administer ordered medications as needed  - Encourage mobilization and activity  - Consider nutritional services referral to assist patient with adequate nutrition and appropriate food choices  Outcome: Progressing     Problem: METABOLIC, FLUID AND ELECTROLYTES - ADULT  Goal: Electrolytes maintained within normal limits  Description: INTERVENTIONS:  - Monitor labs and assess patient for signs and symptoms of electrolyte imbalances  - Administer electrolyte replacement as ordered  - Monitor response to electrolyte replacements, including repeat lab results as appropriate  - Instruct patient on fluid and nutrition as appropriate  Outcome: Progressing

## 2022-06-06 NOTE — PLAN OF CARE
Problem: PAIN - ADULT  Goal: Verbalizes/displays adequate comfort level or baseline comfort level  Description: Interventions:  - Encourage patient to monitor pain and request assistance  - Assess pain using appropriate pain scale  - Administer analgesics based on type and severity of pain and evaluate response  - Implement non-pharmacological measures as appropriate and evaluate response  - Consider cultural and social influences on pain and pain management  - Notify physician/advanced practitioner if interventions unsuccessful or patient reports new pain  Outcome: Progressing     Problem: GASTROINTESTINAL - ADULT  Goal: Minimal or absence of nausea and/or vomiting  Description: INTERVENTIONS:  - Administer IV fluids if ordered to ensure adequate hydration  - Maintain NPO status until nausea and vomiting are resolved  - Nasogastric tube if ordered  - Administer ordered antiemetic medications as needed  - Provide nonpharmacologic comfort measures as appropriate  - Advance diet as tolerated, if ordered  - Consider nutrition services referral to assist patient with adequate nutrition and appropriate food choices  Outcome: Progressing  Goal: Maintains or returns to baseline bowel function  Description: INTERVENTIONS:  - Assess bowel function  - Encourage oral fluids to ensure adequate hydration  - Administer IV fluids if ordered to ensure adequate hydration  - Administer ordered medications as needed  - Encourage mobilization and activity  - Consider nutritional services referral to assist patient with adequate nutrition and appropriate food choices  Outcome: Progressing     Problem: METABOLIC, FLUID AND ELECTROLYTES - ADULT  Goal: Electrolytes maintained within normal limits  Description: INTERVENTIONS:  - Monitor labs and assess patient for signs and symptoms of electrolyte imbalances  - Administer electrolyte replacement as ordered  - Monitor response to electrolyte replacements, including repeat lab results as appropriate  - Instruct patient on fluid and nutrition as appropriate  Outcome: Progressing

## 2022-06-07 ENCOUNTER — APPOINTMENT (OUTPATIENT)
Dept: RADIOLOGY | Facility: HOSPITAL | Age: 27
End: 2022-06-07
Payer: COMMERCIAL

## 2022-06-07 ENCOUNTER — ANESTHESIA (OUTPATIENT)
Dept: PERIOP | Facility: HOSPITAL | Age: 27
End: 2022-06-07
Payer: COMMERCIAL

## 2022-06-07 ENCOUNTER — ANESTHESIA EVENT (OUTPATIENT)
Dept: PERIOP | Facility: HOSPITAL | Age: 27
End: 2022-06-07
Payer: COMMERCIAL

## 2022-06-07 ENCOUNTER — APPOINTMENT (OUTPATIENT)
Dept: PERIOP | Facility: HOSPITAL | Age: 27
End: 2022-06-07
Payer: COMMERCIAL

## 2022-06-07 VITALS
HEART RATE: 63 BPM | WEIGHT: 181.88 LBS | OXYGEN SATURATION: 96 % | SYSTOLIC BLOOD PRESSURE: 122 MMHG | RESPIRATION RATE: 18 BRPM | TEMPERATURE: 97.7 F | BODY MASS INDEX: 26.94 KG/M2 | DIASTOLIC BLOOD PRESSURE: 70 MMHG | HEIGHT: 69 IN

## 2022-06-07 PROBLEM — D72.829 LEUKOCYTOSIS: Status: RESOLVED | Noted: 2019-01-05 | Resolved: 2022-06-07

## 2022-06-07 LAB
ANION GAP SERPL CALCULATED.3IONS-SCNC: 7 MMOL/L (ref 4–13)
BUN SERPL-MCNC: 8 MG/DL (ref 5–25)
CALCIUM SERPL-MCNC: 8.9 MG/DL (ref 8.3–10.1)
CHLORIDE SERPL-SCNC: 101 MMOL/L (ref 100–108)
CO2 SERPL-SCNC: 31 MMOL/L (ref 21–32)
CREAT SERPL-MCNC: 1.01 MG/DL (ref 0.6–1.3)
ERYTHROCYTE [DISTWIDTH] IN BLOOD BY AUTOMATED COUNT: 16.1 % (ref 11.6–15.1)
GFR SERPL CREATININE-BSD FRML MDRD: 100 ML/MIN/1.73SQ M
GLUCOSE P FAST SERPL-MCNC: 86 MG/DL (ref 65–99)
GLUCOSE SERPL-MCNC: 86 MG/DL (ref 65–140)
HCT VFR BLD AUTO: 36.1 % (ref 36.5–49.3)
HGB BLD-MCNC: 10.8 G/DL (ref 12–17)
MCH RBC QN AUTO: 19 PG (ref 26.8–34.3)
MCHC RBC AUTO-ENTMCNC: 29.9 G/DL (ref 31.4–37.4)
MCV RBC AUTO: 63 FL (ref 82–98)
PLATELET # BLD AUTO: 414 THOUSANDS/UL (ref 149–390)
PMV BLD AUTO: 8.2 FL (ref 8.9–12.7)
POTASSIUM SERPL-SCNC: 3.3 MMOL/L (ref 3.5–5.3)
RBC # BLD AUTO: 5.69 MILLION/UL (ref 3.88–5.62)
SODIUM SERPL-SCNC: 139 MMOL/L (ref 136–145)
WBC # BLD AUTO: 7.13 THOUSAND/UL (ref 4.31–10.16)

## 2022-06-07 PROCEDURE — 88342 IMHCHEM/IMCYTCHM 1ST ANTB: CPT | Performed by: PATHOLOGY

## 2022-06-07 PROCEDURE — 88305 TISSUE EXAM BY PATHOLOGIST: CPT | Performed by: PATHOLOGY

## 2022-06-07 PROCEDURE — 85027 COMPLETE CBC AUTOMATED: CPT | Performed by: FAMILY MEDICINE

## 2022-06-07 PROCEDURE — C9113 INJ PANTOPRAZOLE SODIUM, VIA: HCPCS | Performed by: FAMILY MEDICINE

## 2022-06-07 PROCEDURE — 76705 ECHO EXAM OF ABDOMEN: CPT

## 2022-06-07 PROCEDURE — 43239 EGD BIOPSY SINGLE/MULTIPLE: CPT | Performed by: INTERNAL MEDICINE

## 2022-06-07 PROCEDURE — 99217 PR OBSERVATION CARE DISCHARGE MANAGEMENT: CPT | Performed by: FAMILY MEDICINE

## 2022-06-07 PROCEDURE — 80048 BASIC METABOLIC PNL TOTAL CA: CPT | Performed by: FAMILY MEDICINE

## 2022-06-07 RX ORDER — CIPROFLOXACIN 500 MG/1
500 TABLET, FILM COATED ORAL ONCE
Qty: 1 TABLET | Refills: 0 | Status: SHIPPED | OUTPATIENT
Start: 2022-06-08 | End: 2022-06-08

## 2022-06-07 RX ORDER — POTASSIUM CHLORIDE 20 MEQ/1
20 TABLET, EXTENDED RELEASE ORAL ONCE
Status: COMPLETED | OUTPATIENT
Start: 2022-06-07 | End: 2022-06-07

## 2022-06-07 RX ORDER — LIDOCAINE HYDROCHLORIDE 10 MG/ML
INJECTION, SOLUTION EPIDURAL; INFILTRATION; INTRACAUDAL; PERINEURAL AS NEEDED
Status: DISCONTINUED | OUTPATIENT
Start: 2022-06-07 | End: 2022-06-07

## 2022-06-07 RX ORDER — VANCOMYCIN HYDROCHLORIDE 125 MG/1
125 CAPSULE ORAL EVERY 12 HOURS
Qty: 10 CAPSULE | Refills: 0 | Status: SHIPPED | OUTPATIENT
Start: 2022-06-07 | End: 2022-06-12

## 2022-06-07 RX ORDER — POTASSIUM CHLORIDE 20 MEQ/1
40 TABLET, EXTENDED RELEASE ORAL ONCE
Status: COMPLETED | OUTPATIENT
Start: 2022-06-07 | End: 2022-06-07

## 2022-06-07 RX ORDER — PROPOFOL 10 MG/ML
INJECTION, EMULSION INTRAVENOUS AS NEEDED
Status: DISCONTINUED | OUTPATIENT
Start: 2022-06-07 | End: 2022-06-07

## 2022-06-07 RX ORDER — METRONIDAZOLE 500 MG/1
500 TABLET ORAL EVERY 8 HOURS SCHEDULED
Qty: 2 TABLET | Refills: 0 | Status: SHIPPED | OUTPATIENT
Start: 2022-06-07 | End: 2022-06-08

## 2022-06-07 RX ADMIN — METRONIDAZOLE 500 MG: 500 TABLET ORAL at 08:35

## 2022-06-07 RX ADMIN — Medication 125 MG: at 08:36

## 2022-06-07 RX ADMIN — ONDANSETRON 4 MG: 2 INJECTION INTRAMUSCULAR; INTRAVENOUS at 07:34

## 2022-06-07 RX ADMIN — PANTOPRAZOLE SODIUM 40 MG: 40 INJECTION, POWDER, FOR SOLUTION INTRAVENOUS at 08:36

## 2022-06-07 RX ADMIN — POTASSIUM CHLORIDE 40 MEQ: 20 TABLET, EXTENDED RELEASE ORAL at 10:39

## 2022-06-07 RX ADMIN — CIPROFLOXACIN 400 MG: 2 INJECTION, SOLUTION INTRAVENOUS at 17:42

## 2022-06-07 RX ADMIN — LIDOCAINE HYDROCHLORIDE 50 MG: 10 INJECTION, SOLUTION EPIDURAL; INFILTRATION; INTRACAUDAL; PERINEURAL at 16:44

## 2022-06-07 RX ADMIN — HYDROMORPHONE HYDROCHLORIDE 1 MG: 1 INJECTION, SOLUTION INTRAMUSCULAR; INTRAVENOUS; SUBCUTANEOUS at 14:22

## 2022-06-07 RX ADMIN — CIPROFLOXACIN 400 MG: 2 INJECTION, SOLUTION INTRAVENOUS at 05:48

## 2022-06-07 RX ADMIN — DULOXETINE HYDROCHLORIDE 60 MG: 60 CAPSULE, DELAYED RELEASE ORAL at 08:35

## 2022-06-07 RX ADMIN — SODIUM CHLORIDE, SODIUM LACTATE, POTASSIUM CHLORIDE, AND CALCIUM CHLORIDE 100 ML/HR: .6; .31; .03; .02 INJECTION, SOLUTION INTRAVENOUS at 05:53

## 2022-06-07 RX ADMIN — HYDROMORPHONE HYDROCHLORIDE 1 MG: 1 INJECTION, SOLUTION INTRAMUSCULAR; INTRAVENOUS; SUBCUTANEOUS at 07:34

## 2022-06-07 RX ADMIN — HYDROMORPHONE HYDROCHLORIDE 1 MG: 1 INJECTION, SOLUTION INTRAMUSCULAR; INTRAVENOUS; SUBCUTANEOUS at 18:22

## 2022-06-07 RX ADMIN — PROPOFOL 100 MG: 10 INJECTION, EMULSION INTRAVENOUS at 16:47

## 2022-06-07 RX ADMIN — HYDROMORPHONE HYDROCHLORIDE 1 MG: 1 INJECTION, SOLUTION INTRAMUSCULAR; INTRAVENOUS; SUBCUTANEOUS at 02:58

## 2022-06-07 RX ADMIN — PROPOFOL 150 MG: 10 INJECTION, EMULSION INTRAVENOUS at 16:44

## 2022-06-07 RX ADMIN — POTASSIUM CHLORIDE 20 MEQ: 20 TABLET, EXTENDED RELEASE ORAL at 13:12

## 2022-06-07 RX ADMIN — SULFASALAZINE 1000 MG: 500 TABLET ORAL at 08:36

## 2022-06-07 RX ADMIN — ONDANSETRON 4 MG: 2 INJECTION INTRAMUSCULAR; INTRAVENOUS at 14:22

## 2022-06-07 RX ADMIN — METRONIDAZOLE 500 MG: 500 TABLET ORAL at 17:42

## 2022-06-07 NOTE — DISCHARGE SUMMARY
Discharge Summary - [address] [facility]    Patient Information: Raymundo Prieto 32 y o  male MRN: [id number]  Unit/Bed#: [address] [place] [address] Encounter: [id number]    Discharging Physician / Practitioner: Berta Anders DO  PCP: Yeni Steward DO  Admission Date: 6/5/2022  Discharge Date: 06/07/22    Reason for Admission: Abdominal Pain (Pt reports upper abdominal pain since 10 am)      Discharge Diagnoses:     Principal Problem:    Epigastric abdominal pain  Active Problems:    COVID-19 virus infection    History of ulcerative colitis    CAR (generalized anxiety disorder)    Ankylosing spondylitis ([facility] )    History of Clostridium difficile infection  Resolved Problems:    Leukocytosis        * Epigastric abdominal pain  Assessment & Plan  Patient was recently admitted from 05/30-6/1 with abdominal pain, rectal obstruction  Underwent pouchoscopy and was treated with IV antibiotics  · Seen in the ER twice since then and was discharged home  · Lipase level 58 with no pancreatitis on imaging  · Supportive treatment with IV fluids, pain medications  Diet was advanced and patient tolerating solids by day of discharge  · CTA done ruled out PE  Right upper quadrant ultrasound was normal   EGD was normal   Biopsies were taken to evaluate for H pylori tomorrow as well  · Follow-up with GI after discharge  No indication for Protonix on discharge    Leukocytosis-resolved as of 6/7/2022  Assessment & Plan  CT scan shows some wall thickening in right abdomen - under distension versus mild colitis  · Patient's pain is in the epigastric region  · Leukocytosis is most likely due to 80 mg of IV Solu-Medrol that he received in the ER on 06/04, possibly reactive in nature    Patient completed his course of Levaquin/Flagyl on 06/04  · Continued patient on IV Cipro and Flagyl while here and transitioned to p o  to complete total of 10 day course from prior admission  · Leukocytosis has resolved    COVID-19 virus infection  Assessment & Plan  Tested positive for COVID on 05/30  Received COVID vaccine but no booster  Patient is asymptomatic    History of ulcerative colitis  Assessment & Plan  Patient is status post partial colectomy and ileal pouch formation with stricture of rectum  Patient advised to follow-up with his surgeon as soon as possible after discharge    History of Clostridium difficile infection  Assessment & Plan  Continue vancomycin p o  as prophylaxis which will be continued for 72 hours post completion of antibiotics    Ankylosing spondylitis ([address] )  Assessment & Plan  Continue sulfasalazine    CAR (generalized anxiety disorder)  Assessment & Plan  Continue Cymbalta      Consultations During Hospital Stay:  IP CONSULT TO GASTROENTEROLOGY    Procedures Performed:     · EGD    Significant Findings:     · Refer to hospital course and above listed diagnosis related plan for details    Imaging while in hospital:    EGD    Result Date: 6/7/2022  Narrative: [address] [place] [address] Operating Room [address] [place] [address] [phone] DATE OF SERVICE: 6/07/22 PHYSICIAN(S): Attending: Puja Chappell MD Fellow: No Staff Documented INDICATION: Epigastric abdominal pain POST-OP DIAGNOSIS: See the impression below  PREPROCEDURE: Informed consent was obtained for the procedure, including sedation  Risks of perforation, hemorrhage, adverse drug reaction and aspiration were discussed  The patient was placed in the left lateral decubitus position  Patient was explained about the risks and benefits of the procedure  Risks including but not limited to bleeding, infection, and perforation were explained in detail  Also explained about less than 100% sensitivity with the exam and other alternatives  DETAILS OF PROCEDURE: Patient was taken to the procedure room where a time out was performed to confirm correct patient and correct procedure   The patient underwent monitored anesthesia care, which was administered by an anesthesia professional  The patient's blood pressure, heart rate, level of consciousness, respirations and oxygen were monitored throughout the procedure  The scope was advanced to the second part of the duodenum  Retroflexion was performed in the fundus  The patient experienced no blood loss  The procedure was not difficult  The patient tolerated the procedure well  There were no apparent complications  ANESTHESIA INFORMATION: ASA: ASA status not filed in the log  Anesthesia Type: Anesthesia type not filed in the log  MEDICATIONS: lactated ringers infusion 1,241  67 mL*  *From user-documented volume (Totals for administrations occurring from 1622 to 1652 on 06/07/22) FINDINGS: All observed locations appeared normal  Performed random biopsy using biopsy forceps to rule out H  pylori  SPECIMENS: ID Type Source Tests Collected by Time Destination 1 : antrum bx r/o h pylori  Tissue Stomach TISSUE EXAM Amador Saavedra MD 6/7/2022  4:48 PM      Impression: Normal EGD Antral biopsies obtained to evaluate for H pylori RECOMMENDATION: Await pathology results   Amador Saavedra MD     CT abdomen pelvis wo contrast    Result Date: 6/5/2022  Narrative: CT ABDOMEN AND PELVIS WITHOUT IV CONTRAST INDICATION: Epigastric pain  COMPARISON:  6/4/2022 TECHNIQUE:  CT examination of the abdomen and pelvis was performed without intravenous contrast  This examination was performed without intravenous contrast in the context of the critical nationwide Omnipaque shortage  Axial, sagittal, and coronal 2D reformatted images were created from the source data and submitted for interpretation  Radiation dose length product (DLP) for this visit:  470 mGy-cm   This examination, like all CT scans performed in the [facility], was performed utilizing techniques to minimize radiation dose exposure, including the use of iterative reconstruction and automated exposure control  Enteric contrast was not administered   FINDINGS: ABDOMEN LOWER CHEST:  Clear lung bases  LIVER/BILIARY TREE:  Unremarkable  GALLBLADDER:  No calcified gallstones  No pericholecystic inflammatory change  SPLEEN:  Unremarkable  PANCREAS:  Unremarkable  ADRENAL GLANDS:  Unremarkable  KIDNEYS/URETERS:  No nephrolithiasis or obstructive uropathy  STOMACH AND BOWEL: Postsurgical change from partial colectomy mild wall thickening in the colon in the right abdomen may be due to underdistention versus colitis  No evidence of bowel obstruction  APPENDIX:  Surgically absent  ABDOMINOPELVIC CAVITY:  No ascites  No pneumoperitoneum  No lymphadenopathy  VESSELS:  No abdominal aortic aneurysm  PELVIS REPRODUCTIVE ORGANS:  No prostate enlargement  URINARY BLADDER:  Unremarkable  ABDOMINAL WALL/INGUINAL REGIONS:  Unremarkable  OSSEOUS STRUCTURES:  No acute fracture or destructive osseous lesion  Impression: 1  Wall thickening in the colon in the right abdomen may be due to underdistention versus mild colitis  No evidence of bowel obstruction  2   No evidence of pancreatitis  Workstation performed: [id number]     CTA chest pe study    Result Date: 6/6/2022  Narrative: CT CHEST WITHOUT IV CONTRAST INDICATION:   Chest pain, COVID and history of IBD, eval for PE  Patient has confirmed COVID-19  Positive on 5/30/2022  COMPARISON:  Abdomen CT from 6/5/2022, chest CT from 3/6/2022, chest radiograph from 3/6/2022  TECHNIQUE: Chest CT without intravenous contrast   Axial, sagittal, coronal 2D reformats and coronal MIPS from source data  Radiation dose length product (DLP):  508 mGy-cm   Radiation dose exposure minimized using iterative reconstruction and automated exposure control  FINDINGS: LUNGS:  Mild patchy groundglass opacity and nodularity in the right lower lobe  AIRWAYS: No significant filling defects  PLEURA:  Unremarkable  HEART/GREAT VESSELS:  Normal for age  MEDIASTINUM AND GREGORIO:  Unremarkable  CHEST WALL AND LOWER NECK: Unremarkable   UPPER ABDOMEN:  Partially imaged gas-filled transverse colon  OSSEOUS STRUCTURES: Normal for age  Impression: No pulmonary embolus  Mild patchy ground glass opacity and nodularity in the right lower lobe, in a different distribution than similar abnormalities in March 2022, which may be due to Covid-19 or other infectious/inflammatory process  Workstation performed: [id number]     US right upper quadrant    Result Date: 6/7/2022  Narrative: RIGHT UPPER QUADRANT ULTRASOUND INDICATION:     epigastric pain, r/o biliary pathology  COMPARISON:  Ultrasound January 3, 2022  Correlation CT abdomen pelvis June 5, 2022 TECHNIQUE:   Real-time ultrasound of the right upper quadrant was performed with a curvilinear transducer with both volumetric sweeps and still imaging techniques  FINDINGS: PANCREAS:  Portions of the pancreas are obscured by bowel gas  Visualized portions of the pancreas are unremarkable  AORTA AND IVC:  Visualized portions are normal for patient age  LIVER: Size:  Within normal range  The liver measures 15 7 cm in the midclavicular line  Contour:  Surface contour is smooth  Parenchyma:  Echogenicity and echotexture are within normal limits  No liver mass identified  Limited imaging of the main portal vein shows it to be patent and hepatopetal   BILIARY: The gallbladder is normal in caliber  No wall thickening or pericholecystic fluid  No stones or sludge identified  No sonographic Beatty's sign  No intrahepatic biliary dilatation  CBD measures 4 0 mm  No choledocholithiasis  KIDNEY: Right kidney measures 11 4 x 5 6 x 4 4 cm  Volume 147 7 mL Kidney within normal limits  ASCITES:   None       Impression: Normal  Workstation performed: [id number]     Incidental Findings:   · none    Test Results Pending at Discharge (will require follow up):   · As per After Visit Summary     Outpatient Tests Requested:  · none    Complications:  Refer to hospital course and above listed diagnosis related plan, if any    Hospital Course:     Yogi Wilburn Laura Juarez is a 32 y o  male patient who originally presented to the hospital on 6/5/2022 due to epigastric pain  Patient stated he normally has lower abdominal pain but on day of admission it was in the epigastric region  No acid reflux or heart burn  Reported nausea but no vomiting  EAting food made the pain worse  Pen was constant in nature and felt similar to when he had pancreatitis  He was recently admitted with abdominal pain, rectal obstruction and was discharged home on antibiotics  Reported some bright red blood in the stool  Patient was admitted and started on antibiotics and seen by GI  He underwent extensive workup including imaging and EEG which was negative for acute pathology  Cleared by GI prior to discharge  After EGD, spoke with patient again and he is in agreement with being discharged today  Discharge plan discussed with patient    Please see above list of diagnoses and related plan for additional information  Condition at Discharge: stable     Discharge Day Visit / Exam:     Subjective:  Reports some improvement in abdominal pain with pain medications  Tolerating diet    Vitals: Blood Pressure: 122/70 (06/07/22 1711)  Pulse: 63 (06/07/22 1711)  Temperature: 97 7 °F (36 5 °C) (06/07/22 0734)  Temp Source: Oral (06/07/22 0734)  Respirations: 18 (06/07/22 1711)  Height: 5' 9" (175 3 cm) (06/05/22 2014)  Weight - Scale: 82 5 kg (181 lb 14 1 oz) (06/05/22 2014)  SpO2: 96 % (06/07/22 1711)  Exam:   Physical Exam  Constitutional:       General: He is not in acute distress  Appearance: He is not diaphoretic  HENT:      Head: Normocephalic and atraumatic  Eyes:      General:         Right eye: No discharge  Left eye: No discharge  Cardiovascular:      Rate and Rhythm: Normal rate and regular rhythm  Pulmonary:      Effort: Pulmonary effort is normal  No respiratory distress  Breath sounds: Normal breath sounds  No wheezing or rales     Abdominal:      General: Bowel sounds are normal  There is no distension  Palpations: Abdomen is soft  Tenderness: There is abdominal tenderness (Generalized)  There is no guarding  Neurological:      Mental Status: He is alert and oriented to person, place, and time  Discharge instructions/Information to patient and family:(Discharge Medications and Follow up):   See after visit summary for information provided to patient and family  Provisions for Follow-Up Care:  See after visit summary for information related to follow-up care and any pertinent home health orders  Disposition: Home    Planned Readmission:  No     Discharge Statement:  I spent 30 minutes discharging the patient  This time was spent on the day of discharge  I had direct contact with the patient on the day of discharge  Greater than 50% of the total time was spent examining patient, answering all patient questions, arranging and discussing plan of care with patient as well as directly providing post-discharge instructions  Additional time then spent on discharge activities  Discharge Medications:  See after visit summary for reconciled discharge medications provided to patient and family  ** Please Note: "This note has been constructed using a voice recognition system  Therefore there may be syntax, spelling, and/or grammatical errors   Please call if you have any questions  "**

## 2022-06-07 NOTE — PHYSICIAN ADVISOR
Current patient class: Observation  The patient is currently on Hospital Day: 3 at [place] [address]        The patient was admitted to the hospital  on N/A at N/A for the following diagnosis:  Colitis [K52 9]  Abdominal pain [R10 9]  Generalized abdominal pain [R10 84]     After review of the relevant documentation, labs, vital signs and test results, the patient is most appropriate for OBSERVATION STATUS  Rationale is as follows: The patient is a 32 yrs   Male who presented to the ED at 6/5/2022  2:30 PM with a chief complaint of Abdominal Pain (Pt reports upper abdominal pain since 10 am)   The patient has been getting around the clock pain medications and was put on IV fluids  The patient is going for an EGD today but is otherwise relatively stable and depending on the timing of the EGD patient may be discharged today    Given the above I am recommending keeping observation status since he is medically stable    The patients vitals on arrival were   ED Triage Vitals   Temperature Pulse Respirations Blood Pressure SpO2   06/05/22 1931 06/05/22 1452 06/05/22 1452 06/05/22 1452 06/05/22 1452   98 9 °F (37 2 °C) 97 20 130/95 98 %      Temp Source Heart Rate Source Patient Position - Orthostatic VS BP Location FiO2 (%)   06/05/22 1931 06/05/22 1452 06/05/22 1452 06/05/22 1452 --   Oral Monitor Lying Right arm       Pain Score       06/05/22 1452       8           Past Medical History:   Diagnosis Date    [facility]     Anxiety     Bowel obstruction (HCC)     Clostridium difficile colitis 9/13/2018    Colitis     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[facility] ) 9/13/2018    Pancreatitis     Ulcerative colitis ([facility] )      Past Surgical History:   Procedure Laterality Date    COLECTOMY TOTAL      with ileal pouch and anastemosis    IR PICC PLACEMENT SINGLE LUMEN  3/1/2022    TOTAL COLECTOMY             Consults have been placed to:   IP CONSULT TO GASTROENTEROLOGY    Vitals:    06/06/22 1935 06/06/22 2314 06/07/22 0734 06/07/22 1601   BP: 152/94 124/61 127/85 136/84   BP Location: Right arm Left arm Left arm    Pulse: 69 60 61 67   Resp: 14 16 15 16   Temp: 97 7 °F (36 5 °C) 98 4 °F (36 9 °C) 97 7 °F (36 5 °C)    TempSrc: Oral Oral Oral    SpO2: 98% 98% 99% 98%   Weight:       Height:           Most recent labs:    Recent Labs     06/05/22  1500 06/05/22  1532 06/05/22 2015 06/06/22  0506 06/07/22  0503   WBC 14 58*  --    < > 8 66 7 13   HGB 11 4*  --    < > 9 6* 10 8*   HCT 38 2  --    < > 32 6* 36 1*   *  --    < > 373 414*   K 3 8  --   --  3 4* 3 3*   CALCIUM 8 5  --   --  8 1* 8 9   BUN 13  --   --  10 8   CREATININE 1 02  --   --  1 02 1 01   LIPASE 58*  --   --   --   --    INR  --  0 99  --   --   --    AST 25  --   --  12  --    ALT 25  --   --  19  --    ALKPHOS 77  --   --  64  --     < > = values in this interval not displayed         Scheduled Meds:  Current Facility-Administered Medications   Medication Dose Route Frequency Provider Last Rate    ciprofloxacin  400 mg Intravenous Q12H Darlin Revankar,  mg (06/07/22 0548)    DULoxetine  60 mg Oral Daily Darlin Revankar, DO      HYDROmorphone  0 5 mg Intravenous Q4H PRN [place] Reyez      Or    HYDROmorphone  1 mg Intravenous Q4H PRN PedroiKY Jean      lactated ringers  100 mL/hr Intravenous Continuous Darlin Revankar,  mL/hr (06/07/22 0553)    metroNIDAZOLE  500 mg Oral Q8H [address] Darlin Revankar, DO      ondansetron  4 mg Intravenous Q6H PRN Darlin Revankar, DO      pantoprazole  40 mg Intravenous Q12H [address] Darlin Revankar, DO      sulfaSALAzine  1,000 mg Oral BID Darlin Revankar, DO      vancomycin  125 mg Oral Q12H Darlin Revankar, DO       Continuous Infusions:lactated ringers, 100 mL/hr, Last Rate: 100 mL/hr (06/07/22 0553)      PRN Meds:   HYDROmorphone **OR** HYDROmorphone    ondansetron

## 2022-06-07 NOTE — NURSING NOTE
Pt discharged home with family  Educated on follow up appointments and antibiotics  IV removed with catheter clean dry intact  All belongings sent with patient  No further complaints at this time

## 2022-06-07 NOTE — ANESTHESIA POSTPROCEDURE EVALUATION
Post-Op Assessment Note    CV Status:  Stable  Pain Score: 0    Pain management: adequate     Mental Status:  Sleepy   Hydration Status:  Euvolemic and stable   PONV Controlled:  Controlled   Airway Patency:  Patent      Post Op Vitals Reviewed: Yes      Staff: CRNA         No complications documented      BP   118/64   Temp      Pulse 61   Resp 16   SpO2 98

## 2022-06-08 ENCOUNTER — TELEPHONE (OUTPATIENT)
Dept: FAMILY MEDICINE CLINIC | Facility: CLINIC | Age: 27
End: 2022-06-08

## 2022-06-08 NOTE — TELEPHONE ENCOUNTER
----- Message from Fartun De Guzman DO sent at 6/1/2022 11:52 PM EDT -----  Regarding: RE: Discharge  Thank you for allowing us to participate in the care of your patient, Rob Roth, who was hospitalized from 5/30/2022 through 6/1/2022 with the admitting diagnosis of pouchitis, rectal obstruction on imaging  Underwent pouchoscopy by GI and tx with abx  Abd pain improved and pt advised to f/u with his docs at [facility]      If you have any additional questions or would like to discuss further, please feel free to contact me      [facility], Hospitalist  [phone]

## 2022-06-08 NOTE — TELEPHONE ENCOUNTER
----- Message from [facility], DO sent at 6/7/2022  6:49 PM EDT -----  Regarding: RE: Discharge  Thank you for allowing us to participate in the care of your patient, Rosio Ferrer, who was hospitalized from 6/5/2022 through 6/7/2022 with the admitting diagnosis of epigastric pain  Underwent extensive workup including imaging/EGD which was unremarkable  Patient advised to follow-up with his surgeon and GI after discharge      If you have any additional questions or would like to discuss further, please feel free to contact me      [facility], Liu [facility], Hospitalist  [phone]

## 2022-06-09 RX ORDER — METRONIDAZOLE 500 MG/1
TABLET ORAL
Status: ON HOLD | COMMUNITY
Start: 2022-06-08 | End: 2022-07-14 | Stop reason: SDUPTHER

## 2022-06-09 RX ORDER — CIPROFLOXACIN 500 MG/1
TABLET, FILM COATED ORAL
COMMUNITY
Start: 2022-06-08 | End: 2022-07-14

## 2022-06-10 ENCOUNTER — OFFICE VISIT (OUTPATIENT)
Dept: FAMILY MEDICINE CLINIC | Facility: CLINIC | Age: 27
End: 2022-06-10
Payer: COMMERCIAL

## 2022-06-10 VITALS
RESPIRATION RATE: 18 BRPM | TEMPERATURE: 96.9 F | BODY MASS INDEX: 26.51 KG/M2 | WEIGHT: 179 LBS | HEART RATE: 113 BPM | HEIGHT: 69 IN | DIASTOLIC BLOOD PRESSURE: 70 MMHG | OXYGEN SATURATION: 96 % | SYSTOLIC BLOOD PRESSURE: 118 MMHG

## 2022-06-10 DIAGNOSIS — K91.850 ILEAL POUCHITIS (HCC): ICD-10-CM

## 2022-06-10 DIAGNOSIS — K51.018 ULCERATIVE CHRONIC PANCOLITIS WITH OTHER COMPLICATION (HCC): ICD-10-CM

## 2022-06-10 DIAGNOSIS — F41.1 GAD (GENERALIZED ANXIETY DISORDER): Primary | ICD-10-CM

## 2022-06-10 DIAGNOSIS — K91.858: ICD-10-CM

## 2022-06-10 DIAGNOSIS — D64.9 ANEMIA, UNSPECIFIED TYPE: ICD-10-CM

## 2022-06-10 PROBLEM — U07.1 COVID-19 VIRUS INFECTION: Status: RESOLVED | Noted: 2022-05-30 | Resolved: 2022-06-10

## 2022-06-10 PROBLEM — R65.10 SIRS (SYSTEMIC INFLAMMATORY RESPONSE SYNDROME) (HCC): Status: RESOLVED | Noted: 2022-02-24 | Resolved: 2022-06-10

## 2022-06-10 PROBLEM — A41.9 SEPSIS (HCC): Status: RESOLVED | Noted: 2022-01-02 | Resolved: 2022-06-10

## 2022-06-10 PROCEDURE — 1111F DSCHRG MED/CURRENT MED MERGE: CPT | Performed by: FAMILY MEDICINE

## 2022-06-10 PROCEDURE — 99495 TRANSJ CARE MGMT MOD F2F 14D: CPT | Performed by: FAMILY MEDICINE

## 2022-06-10 RX ORDER — HYDROXYZINE HYDROCHLORIDE 25 MG/1
TABLET, FILM COATED ORAL
Qty: 90 TABLET | Refills: 1 | Status: SHIPPED | OUTPATIENT
Start: 2022-06-10 | End: 2022-07-14

## 2022-06-10 RX ORDER — DULOXETIN HYDROCHLORIDE 60 MG/1
60 CAPSULE, DELAYED RELEASE ORAL DAILY
Qty: 90 CAPSULE | Refills: 3 | Status: SHIPPED | OUTPATIENT
Start: 2022-06-10

## 2022-06-10 NOTE — LETTER
Sammi 10, 2022     Patient: Lotus Kamara  YOB: 1995  Date of Visit: 6/10/2022      To Whom it May Concern:    Shivani Rose is under my professional care  Sky Aldana was seen in my office on 6/10/2022  Excuse from work 6/4/22 through 6/6/22  If you have any questions or concerns, please don't hesitate to call           Sincerely,          Wilhemena Koyanagi,         CC: No Recipients

## 2022-06-10 NOTE — PROGRESS NOTES
Assessment/Plan:    No problem-specific Assessment & Plan notes found for this encounter  UC, f/u [facility] specialist who is out of country in [place] currently but watches for obstruction    CAR stable but has situational triggers that worsen his gi symptoms, willing to try hydroxyzine  Hesitate to try levsin since worried about obstruction risk    Anemia chornic, due thal minor per pt     Diagnoses and all orders for this visit:    CAR (generalized anxiety disorder)  -     DULoxetine (CYMBALTA) 60 mg delayed release capsule; Take 1 capsule (60 mg total) by mouth daily  -     hydrOXYzine HCL (ATARAX) 25 mg tablet; 1-2 po q6hrs prn anxiety    Ulcerative chronic pancolitis with other complication (HCC)    Complications of intestinal pouch (HCC)    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HCC)    Anemia, unspecified type    BMI 26 0-26 9,adult    Other orders  -     ciprofloxacin (CIPRO) 500 mg tablet  -     metroNIDAZOLE (FLAGYL) 500 mg tablet        Return if symptoms worsen or fail to improve  Subjective:      Patient ID: Gosia Luevano is a 32 y o  male      Chief Complaint   Patient presents with    Transition of Care Management     Sas/cma       HPI  On cymbalta  Has CAR  Stress whenever traveling and leaving home  Specialist in [address] [place] [address] S flare ups when leaves home on trips  Stress of leaving house triggers bowel issues, diarrhea  Most days ok on cymbalta 60mg per pt  No palpitations or tremors when anxious  GGO noted on CT chest, recent hx of covid    TCM Call (since 5/10/2022)     Date and time call was made  6/2/2022 11:25 AM    Hospital care reviewed  Records reviewed    Patient was hospitialized at  [address] [place] [address]    Date of Admission  05/30/22    Date of discharge  06/01/22    Diagnosis  Rectal obstruction    Disposition  Home    Were the patients medications reviewed and updated  Yes    Current Symptoms  --  Continued abdominal discomfort , not worsening      TCM Call (since 5/10/2022)     Post hospital issues  None Should patient be enrolled in anticoag monitoring? No    Scheduled for follow up? Yes    Patients specialists  Other (comment)    Other specialists names  Dr Lalo Lewis, Surgeon at [facility]    Did you obtain your prescribed medications  Yes    Do you need help managing your prescriptions or medications  No    Is transportation to your appointment needed  No    I have advised the patient to call PCP with any new or worsening symptoms  [facility] 50 members    Support System  Family    The type of support provided  Physical; Emotional    Do you have social support  Yes, as much as I need    Are you recieving any outpatient services  No    Are you recieving home care services  No    Interperter language line needed  No    Counseling  Patient    Counseling topics  Activities of daily living; patient and family education; instructions for management; Importance of RX compliance; Risk factor reduction    Comments  Galileo Berger states that he is about the same  He still has discomfort but he doesn't really want to take the Morphine  He is always hesitant to take pain meds as he worries about an obstruction  He knows to call his surgeon at [facility] if any concerns and is following up with him as well as Dr Radha Bashir  He knows to report any fevers, uncontrolled pain, etc and to please contact us with any questions/concerns at any time [facility] LPN            The following portions of the patient's history were reviewed and updated as appropriate: allergies, current medications, past family history, past medical history, past social history, past surgical history and problem list     Review of Systems   Constitutional: Negative for fever  Respiratory: Negative for cough            Current Outpatient Medications   Medication Sig Dispense Refill    ciprofloxacin (CIPRO) 500 mg tablet       DULoxetine (CYMBALTA) 60 mg delayed release capsule Take 1 capsule (60 mg total) by mouth daily 90 capsule 3    hydrOXYzine HCL (ATARAX) 25 mg tablet 1-2 po q6hrs prn anxiety 90 tablet 1    metroNIDAZOLE (FLAGYL) 500 mg tablet       sulfaSALAzine (AZULFIDINE) 500 mg tablet Take 2 tablets (1,000 mg total) by mouth 2 (two) times a day  0    vancomycin (VANCOCIN) 125 MG capsule Take 1 capsule (125 mg total) by mouth every 12 (twelve) hours for 5 days 10 capsule 0     No current facility-administered medications for this visit  Objective:    /70   Pulse (!) 113   Temp (!) 96 9 °F (36 1 °C)   Resp 18   Ht 5' 9" (1 753 m)   Wt 81 2 kg (179 lb)   SpO2 96%   BMI 26 43 kg/m²        Physical Exam  Vitals and nursing note reviewed  Constitutional:       General: He is not in acute distress  Appearance: He is well-developed  He is not ill-appearing  HENT:      Head: Normocephalic  Eyes:      General: No scleral icterus  Conjunctiva/sclera: Conjunctivae normal    Cardiovascular:      Rate and Rhythm: Normal rate and regular rhythm  Pulmonary:      Effort: Pulmonary effort is normal  No respiratory distress  Breath sounds: No wheezing  Abdominal:      General: There is no distension  Palpations: Abdomen is soft  There is no mass  Tenderness: There is no abdominal tenderness  Musculoskeletal:         General: No deformity  Cervical back: Neck supple  Right lower leg: No edema  Left lower leg: No edema  Skin:     General: Skin is warm and dry  Coloration: Skin is not jaundiced or pale  Neurological:      Mental Status: He is alert  Motor: No weakness  Gait: Gait normal    Psychiatric:         Mood and Affect: Mood normal          Behavior: Behavior normal          Thought Content:  Thought content normal                 Remonia Lunch, DO

## 2022-06-11 ENCOUNTER — APPOINTMENT (EMERGENCY)
Dept: RADIOLOGY | Facility: HOSPITAL | Age: 27
End: 2022-06-11
Payer: COMMERCIAL

## 2022-06-11 ENCOUNTER — HOSPITAL ENCOUNTER (EMERGENCY)
Facility: HOSPITAL | Age: 27
Discharge: ANOTHER HOSPITAL | End: 2022-06-12
Attending: EMERGENCY MEDICINE
Payer: COMMERCIAL

## 2022-06-11 VITALS
DIASTOLIC BLOOD PRESSURE: 77 MMHG | RESPIRATION RATE: 16 BRPM | HEART RATE: 77 BPM | WEIGHT: 179 LBS | OXYGEN SATURATION: 100 % | HEIGHT: 69 IN | BODY MASS INDEX: 26.51 KG/M2 | TEMPERATURE: 98.1 F | SYSTOLIC BLOOD PRESSURE: 124 MMHG

## 2022-06-11 DIAGNOSIS — K56.609 SBO (SMALL BOWEL OBSTRUCTION) (HCC): Primary | ICD-10-CM

## 2022-06-11 LAB
ANION GAP SERPL CALCULATED.3IONS-SCNC: 12 MMOL/L (ref 4–13)
BASOPHILS # BLD AUTO: 0.13 THOUSANDS/ΜL (ref 0–0.1)
BASOPHILS NFR BLD AUTO: 1 % (ref 0–1)
BUN SERPL-MCNC: 14 MG/DL (ref 5–25)
CALCIUM SERPL-MCNC: 9.1 MG/DL (ref 8.3–10.1)
CHLORIDE SERPL-SCNC: 105 MMOL/L (ref 100–108)
CO2 SERPL-SCNC: 28 MMOL/L (ref 21–32)
CREAT SERPL-MCNC: 1.17 MG/DL (ref 0.6–1.3)
EOSINOPHIL # BLD AUTO: 0.23 THOUSAND/ΜL (ref 0–0.61)
EOSINOPHIL NFR BLD AUTO: 2 % (ref 0–6)
ERYTHROCYTE [DISTWIDTH] IN BLOOD BY AUTOMATED COUNT: 18.6 % (ref 11.6–15.1)
FLUAV RNA RESP QL NAA+PROBE: NEGATIVE
FLUBV RNA RESP QL NAA+PROBE: NEGATIVE
GFR SERPL CREATININE-BSD FRML MDRD: 83 ML/MIN/1.73SQ M
GLUCOSE SERPL-MCNC: 147 MG/DL (ref 65–140)
HCT VFR BLD AUTO: 43.4 % (ref 36.5–49.3)
HGB BLD-MCNC: 12.9 G/DL (ref 12–17)
IMM GRANULOCYTES # BLD AUTO: 0.05 THOUSAND/UL (ref 0–0.2)
IMM GRANULOCYTES NFR BLD AUTO: 1 % (ref 0–2)
LACTATE SERPL-SCNC: 1.9 MMOL/L (ref 0.5–2)
LYMPHOCYTES # BLD AUTO: 2.83 THOUSANDS/ΜL (ref 0.6–4.47)
LYMPHOCYTES NFR BLD AUTO: 30 % (ref 14–44)
MCH RBC QN AUTO: 18.7 PG (ref 26.8–34.3)
MCHC RBC AUTO-ENTMCNC: 29.7 G/DL (ref 31.4–37.4)
MCV RBC AUTO: 63 FL (ref 82–98)
MONOCYTES # BLD AUTO: 1.05 THOUSAND/ΜL (ref 0.17–1.22)
MONOCYTES NFR BLD AUTO: 11 % (ref 4–12)
NEUTROPHILS # BLD AUTO: 5.11 THOUSANDS/ΜL (ref 1.85–7.62)
NEUTS SEG NFR BLD AUTO: 55 % (ref 43–75)
NRBC BLD AUTO-RTO: 0 /100 WBCS
PLATELET # BLD AUTO: 602 THOUSANDS/UL (ref 149–390)
PMV BLD AUTO: 8.7 FL (ref 8.9–12.7)
POTASSIUM SERPL-SCNC: 3.7 MMOL/L (ref 3.5–5.3)
RBC # BLD AUTO: 6.9 MILLION/UL (ref 3.88–5.62)
RSV RNA RESP QL NAA+PROBE: NEGATIVE
SARS-COV-2 RNA RESP QL NAA+PROBE: NEGATIVE
SODIUM SERPL-SCNC: 145 MMOL/L (ref 136–145)
WBC # BLD AUTO: 9.4 THOUSAND/UL (ref 4.31–10.16)

## 2022-06-11 PROCEDURE — 0241U HB NFCT DS VIR RESP RNA 4 TRGT: CPT | Performed by: EMERGENCY MEDICINE

## 2022-06-11 PROCEDURE — 74176 CT ABD & PELVIS W/O CONTRAST: CPT

## 2022-06-11 PROCEDURE — 99285 EMERGENCY DEPT VISIT HI MDM: CPT | Performed by: EMERGENCY MEDICINE

## 2022-06-11 PROCEDURE — 83605 ASSAY OF LACTIC ACID: CPT | Performed by: EMERGENCY MEDICINE

## 2022-06-11 PROCEDURE — 96361 HYDRATE IV INFUSION ADD-ON: CPT

## 2022-06-11 PROCEDURE — 96366 THER/PROPH/DIAG IV INF ADDON: CPT

## 2022-06-11 PROCEDURE — 96375 TX/PRO/DX INJ NEW DRUG ADDON: CPT

## 2022-06-11 PROCEDURE — 99285 EMERGENCY DEPT VISIT HI MDM: CPT

## 2022-06-11 PROCEDURE — 74022 RADEX COMPL AQT ABD SERIES: CPT

## 2022-06-11 PROCEDURE — 80048 BASIC METABOLIC PNL TOTAL CA: CPT | Performed by: EMERGENCY MEDICINE

## 2022-06-11 PROCEDURE — 96365 THER/PROPH/DIAG IV INF INIT: CPT

## 2022-06-11 PROCEDURE — 36415 COLL VENOUS BLD VENIPUNCTURE: CPT | Performed by: EMERGENCY MEDICINE

## 2022-06-11 PROCEDURE — 96372 THER/PROPH/DIAG INJ SC/IM: CPT

## 2022-06-11 PROCEDURE — G1004 CDSM NDSC: HCPCS

## 2022-06-11 PROCEDURE — 85025 COMPLETE CBC W/AUTO DIFF WBC: CPT | Performed by: EMERGENCY MEDICINE

## 2022-06-11 PROCEDURE — 96376 TX/PRO/DX INJ SAME DRUG ADON: CPT

## 2022-06-11 RX ORDER — OLANZAPINE 10 MG/1
10 INJECTION, POWDER, LYOPHILIZED, FOR SOLUTION INTRAMUSCULAR ONCE
Status: COMPLETED | OUTPATIENT
Start: 2022-06-11 | End: 2022-06-11

## 2022-06-11 RX ORDER — HYDROMORPHONE HCL 110MG/55ML
2 PATIENT CONTROLLED ANALGESIA SYRINGE INTRAVENOUS ONCE
Status: COMPLETED | OUTPATIENT
Start: 2022-06-11 | End: 2022-06-11

## 2022-06-11 RX ORDER — ONDANSETRON 2 MG/ML
4 INJECTION INTRAMUSCULAR; INTRAVENOUS ONCE
Status: COMPLETED | OUTPATIENT
Start: 2022-06-11 | End: 2022-06-11

## 2022-06-11 RX ORDER — MORPHINE SULFATE 10 MG/ML
8 INJECTION, SOLUTION INTRAMUSCULAR; INTRAVENOUS ONCE
Status: COMPLETED | OUTPATIENT
Start: 2022-06-11 | End: 2022-06-11

## 2022-06-11 RX ORDER — HYDROMORPHONE HCL/PF 1 MG/ML
1 SYRINGE (ML) INJECTION ONCE
Status: COMPLETED | OUTPATIENT
Start: 2022-06-11 | End: 2022-06-11

## 2022-06-11 RX ORDER — ONDANSETRON 2 MG/ML
4 INJECTION INTRAMUSCULAR; INTRAVENOUS ONCE
Status: DISCONTINUED | OUTPATIENT
Start: 2022-06-12 | End: 2022-06-11

## 2022-06-11 RX ORDER — ONDANSETRON 2 MG/ML
INJECTION INTRAMUSCULAR; INTRAVENOUS
Status: COMPLETED
Start: 2022-06-11 | End: 2022-06-11

## 2022-06-11 RX ORDER — HYDROMORPHONE HCL 110MG/55ML
2 PATIENT CONTROLLED ANALGESIA SYRINGE INTRAVENOUS ONCE
Status: COMPLETED | OUTPATIENT
Start: 2022-06-12 | End: 2022-06-11

## 2022-06-11 RX ORDER — SODIUM CHLORIDE, SODIUM GLUCONATE, SODIUM ACETATE, POTASSIUM CHLORIDE, MAGNESIUM CHLORIDE, SODIUM PHOSPHATE, DIBASIC, AND POTASSIUM PHOSPHATE .53; .5; .37; .037; .03; .012; .00082 G/100ML; G/100ML; G/100ML; G/100ML; G/100ML; G/100ML; G/100ML
1000 INJECTION, SOLUTION INTRAVENOUS ONCE
Status: COMPLETED | OUTPATIENT
Start: 2022-06-11 | End: 2022-06-11

## 2022-06-11 RX ORDER — SODIUM CHLORIDE 9 MG/ML
125 INJECTION, SOLUTION INTRAVENOUS CONTINUOUS
Status: DISCONTINUED | OUTPATIENT
Start: 2022-06-11 | End: 2022-06-12 | Stop reason: HOSPADM

## 2022-06-11 RX ADMIN — HYDROMORPHONE HYDROCHLORIDE 2 MG: 2 INJECTION, SOLUTION INTRAMUSCULAR; INTRAVENOUS; SUBCUTANEOUS at 23:56

## 2022-06-11 RX ADMIN — SODIUM CHLORIDE 125 ML/HR: 0.9 INJECTION, SOLUTION INTRAVENOUS at 08:57

## 2022-06-11 RX ADMIN — OLANZAPINE 10 MG: 10 INJECTION, POWDER, FOR SOLUTION INTRAMUSCULAR at 06:19

## 2022-06-11 RX ADMIN — ONDANSETRON 4 MG: 2 INJECTION INTRAMUSCULAR; INTRAVENOUS at 20:29

## 2022-06-11 RX ADMIN — HYDROMORPHONE HYDROCHLORIDE 2 MG: 2 INJECTION, SOLUTION INTRAMUSCULAR; INTRAVENOUS; SUBCUTANEOUS at 17:10

## 2022-06-11 RX ADMIN — SODIUM CHLORIDE, SODIUM GLUCONATE, SODIUM ACETATE, POTASSIUM CHLORIDE, MAGNESIUM CHLORIDE, SODIUM PHOSPHATE, DIBASIC, AND POTASSIUM PHOSPHATE 1000 ML: .53; .5; .37; .037; .03; .012; .00082 INJECTION, SOLUTION INTRAVENOUS at 06:20

## 2022-06-11 RX ADMIN — ONDANSETRON 4 MG: 2 INJECTION INTRAMUSCULAR; INTRAVENOUS at 08:55

## 2022-06-11 RX ADMIN — MORPHINE SULFATE 8 MG: 10 INJECTION INTRAVENOUS at 12:13

## 2022-06-11 RX ADMIN — MORPHINE SULFATE 8 MG: 10 INJECTION INTRAVENOUS at 15:04

## 2022-06-11 RX ADMIN — ONDANSETRON 4 MG: 2 INJECTION INTRAMUSCULAR; INTRAVENOUS at 05:56

## 2022-06-11 RX ADMIN — MORPHINE SULFATE 8 MG: 10 INJECTION INTRAVENOUS at 05:56

## 2022-06-11 RX ADMIN — HYDROMORPHONE HYDROCHLORIDE 1 MG: 1 INJECTION, SOLUTION INTRAMUSCULAR; INTRAVENOUS; SUBCUTANEOUS at 07:32

## 2022-06-11 RX ADMIN — SODIUM CHLORIDE 125 ML/HR: 0.9 INJECTION, SOLUTION INTRAVENOUS at 17:13

## 2022-06-11 RX ADMIN — HYDROMORPHONE HYDROCHLORIDE 1 MG: 1 INJECTION, SOLUTION INTRAMUSCULAR; INTRAVENOUS; SUBCUTANEOUS at 10:47

## 2022-06-11 RX ADMIN — HYDROMORPHONE HYDROCHLORIDE 2 MG: 2 INJECTION, SOLUTION INTRAMUSCULAR; INTRAVENOUS; SUBCUTANEOUS at 20:29

## 2022-06-11 RX ADMIN — ONDANSETRON 4 MG: 2 INJECTION INTRAMUSCULAR; INTRAVENOUS at 10:50

## 2022-06-11 NOTE — EMTALA/ACUTE CARE TRANSFER
[facility] EMERGENCY DEPARTMENT  [address]  [place] [address]  Dept: [phone]      EMTALA TRANSFER CONSENT    NAME Gioia Schaumann                                         1995                              MRN [id number]    I have been informed of my rights regarding examination, treatment, and transfer   by Dr Macie Garcia DO    Benefits: Specialized equipment and/or services available at the receiving facility (Include comment)________________________    Risks:        Consent for Transfer:  I acknowledge that my medical condition has been evaluated and explained to me by the emergency department physician or other qualified medical person and/or my attending physician, who has recommended that I be transferred to the service of  Accepting Physician: Jose Craft at [address] Name, [facility] : [facility]  The above potential benefits of such transfer, the potential risks associated with such transfer, and the probable risks of not being transferred have been explained to me, and I fully understand them  The doctor has explained that, in my case, the benefits of transfer outweigh the risks  I agree to be transferred  I authorize the performance of emergency medical procedures and treatments upon me in both transit and upon arrival at the receiving facility  Additionally, I authorize the release of any and all medical records to the receiving facility and request they be transported with me, if possible  I understand that the safest mode of transportation during a medical emergency is an ambulance and that the Hospital advocates the use of this mode of transport  Risks of traveling to the receiving facility by car, including absence of medical control, life sustaining equipment, such as oxygen, and medical personnel has been explained to me and I fully understand them      (MICKEY CORRECT BOX BELOW)  [  ]  I consent to the stated transfer and to be transported by ambulance/helicopter  [  ]  I consent to the stated transfer, but refuse transportation by ambulance and accept full responsibility for my transportation by car  I understand the risks of non-ambulance transfers and I exonerate the Hospital and its staff from any deterioration in my condition that results from this refusal     X___________________________________________    DATE  22  TIME________  Signature of patient or legally responsible individual signing on patient behalf           RELATIONSHIP TO PATIENT_________________________          Provider Certification    NAME Milagros Monroy                                        JEMAL 1995                              MRN [id number]    A medical screening exam was performed on the above named patient  Based on the examination:    Condition Necessitating Transfer There were no encounter diagnoses  Patient Condition: The patient has been stabilized such that within reasonable medical probability, no material deterioration of the patient condition or the condition of the unborn child(gonzalez) is likely to result from the transfer    Reason for Transfer: Level of Care needed not available at this facility    Transfer Requirements: [address] [facility] [address]   · Space available and qualified personnel available for treatment as acknowledged by    · Agreed to accept transfer and to provide appropriate medical treatment as acknowledged by       Janeen Cruz  · Appropriate medical records of the examination and treatment of the patient are provided at the time of transfer   [address], [address] _______  · Transfer will be performed by qualified personnel from    and appropriate transfer equipment as required, including the use of necessary and appropriate life support measures      Provider Certification: I have examined the patient and explained the following risks and benefits of being transferred/refusing transfer to the patient/family:  General risk, such as traffic hazards, adverse weather conditions, rough terrain or turbulence, possible failure of equipment (including vehicle or aircraft), or consequences of actions of persons outside the control of the transport personnel, Risk of worsening condition      Based on these reasonable risks and benefits to the patient and/or the unborn child(gonzalez), and based upon the information available at the time of the patients examination, I certify that the medical benefits reasonably to be expected from the provision of appropriate medical treatments at another medical facility outweigh the increasing risks, if any, to the individuals medical condition, and in the case of labor to the unborn child, from effecting the transfer      X____________________________________________ DATE 06/11/22        TIME_______      ORIGINAL - SEND TO MEDICAL RECORDS   COPY - SEND WITH PATIENT DURING TRANSFER

## 2022-06-11 NOTE — ED CARE HANDOFF
Pt seen at shift change  Initial plan had been to attempt KUB with contrast to see if contrast would pass far enough to make CT useful to definitively r/o SBO, but patient could not tolerate contrast    Abd series ordered, +air-fluid levels and distended stomach on my read  Discussed risk/benefit of CT with patient  Given markedly abnormal XR and degree of symptoms I advised repeating CT  CT suspicious for high grade SBO  Lacate neg  Pt requiring multiple does of pain meds  Complex surgical patient will require colorectal input and is best-served by transfer to [facility] where his surgeon can evaluate the need for operative intervention  Pt agreeable to transfer and surgeon agrees with plan  Accepted for transfer to [facility] Dr Robby Esquivel, DO  06/11/22 [id number]

## 2022-06-11 NOTE — ED PROVIDER NOTES
Final Diagnosis:  1  SBO (small bowel obstruction) [facility])        Chief Complaint   Patient presents with    Abdominal Pain     Patient comes this am with severe abdominal pain, vomiting and nausea     HPI  patinet brought in by fellow officer  excrutiating abd pain  Sure he has SBO this time  Long history of colectomy, multiple partial SBO  Gets dilations in [facility]  Surgeon there considering revision but avoiding 2/2 change of failure and req ostomy  Given morphine zofran cannot tolerate PO for contrast  Ultimately signed out to day team  Contrast, 2 hours later KUB eval for contrast transit, then CT when ready  SBO transfer to [facility]  Otherwise admit here     - No language barrier    - History obtained from patient  - There are no limitations to the history obtained  - Previous charting underwent limited review with attention to last ED visits, labs, ekgs, and prior imaging  PMH:   has a past medical history of Ankylosing spondylitis ([facility] [address] ), Anxiety, Bowel obstruction ([facility] [address] ), Clostridium difficile colitis (9/13/2018), Colitis, 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[facility] [address] ) (9/13/2018), Pancreatitis, and Ulcerative colitis ([facility] [address] )  PSH:   has a past surgical history that includes Total colectomy; COLECTOMY TOTAL; and IR PICC placement single lumen (3/1/2022)  Social History:    Tobacco Use: Low Risk     Smoking Tobacco Use: Never Smoker    Smokeless Tobacco Use: Never Used     Alcohol Use: Not on file     Patient with no illicit use    ROS:    Pertinent positives/negatives:   Review of Systems   Unable to perform ROS: Acuity of condition     Pain    PE:     Physical exam highlights:   Physical Exam       Vitals:    06/11/22 1051 06/11/22 1507 06/11/22 1716 06/11/22 2028   BP: 116/64 111/62 112/67 124/77   BP Location: Right arm  Left arm Left arm   Pulse: 86 87 78 77   Resp: 20 20 16 16   Temp:   98 1 °F (36 7 °C)    TempSrc:   Oral    SpO2: 99% 99% 98% 100%   Weight:       Height:         Vitals reviewed by me     Nursing note reviewed  Chaperone present for all sensitive exam   Const: acute distress  Alert  Nontoxic  diaphoretic  HEENT: External ears normal  No protrusion drainage swelling  Nose normal  No drainage/traumatic deformity  MMM  Mouth with baseline/symmetric movement  No trismus  Eyes: No squinting  No icterus  Tracks through the room with normal EOM  No tearing/swelling/drainage  Neck: ROM normal  No rigidity  No meningismus  Cards: Rate as per vitals  Compared to monitor sinus unless documented above  Regular  Well perfused  Pulm: able to verbalize without additional effort  Effort and excursion normal  No disress  No audible wheezing/ stridor  Normal resp rate  Abd:mild distension  No fluctuant wave  Patient without peritoneal pain with shifting/bumping the bed  MSK: ROM normal and baseline  No deformity  Skin: No new rashes visible  Well perfused  Neuro: Nonfocal  Baseline  CN grossly intact  Moving all four with coordination  Psych: Normal behavior and affect  A:  - Nursing note reviewed  Ddx and MDM  sbo                       ED Course as of 06/12/22 0435   Sat Jun 11, 2022   2348 Transferred to [address] in - EMTALA done     CT abdomen pelvis wo contrast   Final Result      Overall findings limited by the lack of intravenous and oral contrast   Abnormal distention of the stomach and proximal small bowel with appearance suggesting obstruction or high-grade stenosis, likely related to adhesion in the left mid lower abdomen  Recently noted wall thickening in the bowel loop in the right lower quadrant appears resolved  Otherwise stable postop findings  The study was marked in [facility] for immediate notification  Workstation performed: [id number]         XR abdomen obstruction series   Final Result      Findings suspicious for small bowel obstruction  Please refer to CT report from the same date           Workstation performed: [id number]           Orders Placed This Encounter   Procedures    COVID/FLU/RSV - 2 hour TAT    XR abdomen obstruction series    CT abdomen pelvis wo contrast    CBC and differential    Basic metabolic panel    Lactic acid    Transfer to other facility     Labs Reviewed   CBC AND DIFFERENTIAL - Abnormal       Result Value Ref Range Status    WBC 9 40  4 31 - 10 16 Thousand/uL Final    RBC 6 90 (*) 3 88 - 5 62 Million/uL Final    Hemoglobin 12 9  12 0 - 17 0 g/dL Final    Hematocrit 43 4  36 5 - 49 3 % Final    MCV 63 (*) 82 - 98 fL Final    MCH 18 7 (*) 26 8 - 34 3 pg Final    MCHC 29 7 (*) 31 4 - 37 4 g/dL Final    RDW 18 6 (*) 11 6 - 15 1 % Final    MPV 8 7 (*) 8 9 - 12 7 fL Final    Platelets 887 (*) 496 - 390 Thousands/uL Final    nRBC 0  /100 WBCs Final    Neutrophils Relative 55  43 - 75 % Final    Immat GRANS % 1  0 - 2 % Final    Lymphocytes Relative 30  14 - 44 % Final    Monocytes Relative 11  4 - 12 % Final    Eosinophils Relative 2  0 - 6 % Final    Basophils Relative 1  0 - 1 % Final    Neutrophils Absolute 5 11  1 85 - 7 62 Thousands/µL Final    Immature Grans Absolute 0 05  0 00 - 0 20 Thousand/uL Final    Lymphocytes Absolute 2 83  0 60 - 4 47 Thousands/µL Final    Monocytes Absolute 1 05  0 17 - 1 22 Thousand/µL Final    Eosinophils Absolute 0 23  0 00 - 0 61 Thousand/µL Final    Basophils Absolute 0 13 (*) 0 00 - 0 10 Thousands/µL Final   BASIC METABOLIC PANEL - Abnormal    Sodium 145  136 - 145 mmol/L Final    Potassium 3 7  3 5 - 5 3 mmol/L Final    Chloride 105  100 - 108 mmol/L Final    CO2 28  21 - 32 mmol/L Final    ANION GAP 12  4 - 13 mmol/L Final    BUN 14  5 - 25 mg/dL Final    Creatinine 1 17  0 60 - 1 30 mg/dL Final    Comment: Standardized to IDMS reference method    Glucose 147 (*) 65 - 140 mg/dL Final    Comment: If the patient is fasting, the ADA then defines impaired fasting glucose as > 100 mg/dL and diabetes as > or equal to 123 mg/dL    Specimen collection should occur prior to Sulfasalazine administration due to the potential for falsely depressed results  Specimen collection should occur prior to Sulfapyridine administration due to the potential for falsely elevated results  Calcium 9 1  8 3 - 10 1 mg/dL Final    eGFR 83  ml/min/1 73sq m Final    Narrative:     [place] guidelines for Chronic Kidney Disease (CKD):     Stage 1 with normal or high GFR (GFR > 90 mL/min/1 73 square meters)    Stage 2 Mild CKD (GFR = 60-89 mL/min/1 73 square meters)    Stage 3A Moderate CKD (GFR = 45-59 mL/min/1 73 square meters)    Stage 3B Moderate CKD (GFR = 30-44 mL/min/1 73 square meters)    Stage 4 Severe CKD (GFR = 15-29 mL/min/1 73 square meters)    Stage 5 End Stage CKD (GFR <15 mL/min/1 73 square meters)  Note: GFR calculation is accurate only with a steady state creatinine   COVID19, INFLUENZA A/B, RSV PCR, [facility] - Normal    SARS-CoV-2 Negative  Negative Final    INFLUENZA A PCR Negative  Negative Final    INFLUENZA B PCR Negative  Negative Final    RSV PCR Negative  Negative Final    Narrative:     FOR PEDIATRIC PATIENTS - copy/paste COVID Guidelines URL to browser: https://[employer] org/  ashx    SARS-CoV-2 assay is a Nucleic Acid Amplification assay intended for the  qualitative detection of nucleic acid from SARS-CoV-2 in nasopharyngeal  swabs  Results are for the presumptive identification of SARS-CoV-2 RNA  Positive results are indicative of infection with SARS-CoV-2, the virus  causing COVID-19, but do not rule out bacterial infection or co-infection  with other viruses  Laboratories within the [place] and its  territories are required to report all positive results to the appropriate  public health authorities  Negative results do not preclude SARS-CoV-2  infection and should not be used as the sole basis for treatment or other  patient management decisions   Negative results must be combined with  clinical observations, patient history, and epidemiological information  This test has not been FDA cleared or approved  This test has been authorized by FDA under an Emergency Use Authorization  (EUA)  This test is only authorized for the duration of time the  declaration that circumstances exist justifying the authorization of the  emergency use of an in vitro diagnostic tests for detection of SARS-CoV-2  virus and/or diagnosis of COVID-19 infection under section 564(b)(1) of  the Act, 21 U  S C  677BJC-8(S)(6), unless the authorization is terminated  or revoked sooner  The test has been validated but independent review by FDA  and CLIA is pending  Test performed using [employer] Gene[employer]pert: This RT-PCR assay targets N2,  a region unique to SARS-CoV-2  A conserved region in the E-gene was chosen  for pan-Sarbecovirus detection which includes SARS-CoV-2  LACTIC ACID, PLASMA - Normal    LACTIC ACID 1 9  0 5 - 2 0 mmol/L Final    Narrative:     Result may be elevated if tourniquet was used during collection  Final Diagnosis:  1   SBO (small bowel obstruction) (HCC)        P:  - hospital tx includes   Medications   ondansetron (ZOFRAN) injection 4 mg (4 mg Intravenous Given 6/11/22 0556)   morphine injection 8 mg (8 mg Intravenous Given 6/11/22 0556)   OLANZapine (ZyPREXA) IM injection 10 mg (10 mg Intramuscular Given 6/11/22 0619)   multi-electrolyte (ISOLYTE-S PH 7 4) bolus 1,000 mL (0 mL Intravenous Stopped 6/11/22 0857)   HYDROmorphone (DILAUDID) injection 1 mg (1 mg Intravenous Given 6/11/22 0732)   ondansetron (ZOFRAN) injection 4 mg (4 mg Intravenous Given 6/11/22 0855)   HYDROmorphone (DILAUDID) injection 1 mg (1 mg Intravenous Given 6/11/22 1047)   ondansetron (ZOFRAN) injection 4 mg (4 mg Intravenous Given 6/11/22 1050)   morphine injection 8 mg (8 mg Intravenous Given 6/11/22 1213)   morphine injection 8 mg (8 mg Intravenous Given 6/11/22 1504)   HYDROmorphone (DILAUDID) injection 2 mg (2 mg Intravenous Given 6/11/22 1710) HYDROmorphone (DILAUDID) injection 2 mg (2 mg Intravenous Given 6/11/22 2029)   ondansetron (ZOFRAN) injection 4 mg (4 mg Intravenous Given 6/11/22 2029)   HYDROmorphone (DILAUDID) injection 2 mg (2 mg Intravenous Given 6/11/22 4803)         - disposition  Time reflects when diagnosis was documented in both MDM as applicable and the Disposition within this note     Time User Action Codes Description Comment    6/11/2022  7:22 PM Nayla Figueroa Add [I76 517] SBO (small bowel obstruction) [facility])       ED Disposition     ED Disposition   Transfer to Another Facility-In Network    Condition   --    Date/Time   Sat Jun 11, 2022 10:51 AM    Comment   Eliza Guzman should be transferred out to [facility] Dr Carlos Hernandez colorectal service  MD Documentation    Armin Liter Most Recent Value   Patient Condition The patient has been stabilized such that within reasonable medical probability, no material deterioration of the patient condition or the condition of the unborn child(gonzalez) is likely to result from the transfer   Reason for Transfer Level of Care needed not available at this facility   Benefits of Transfer Specialized equipment and/or services available at the receiving facility (Include comment)________________________   Accepting Physician DR Catalina [facility] Name, [address] [place] [address] by (Company and Unit #) Sacha Ley MD, DR   [facility]   Provider Certification General risk, such as traffic hazards, adverse weather conditions, rough terrain or turbulence, possible failure of equipment (including vehicle or aircraft), or consequences of actions of persons outside the control of the transport personnel, Risk of worsening condition      RN Documentation    [address] Name, [address]   Bed Assignment Mary Marroquin RM 1023   Report Given to [facility] RN   Transport Mode Ambulance   Transported by [employer] and Unit #) [facility]   Level of Care Basic life support      Follow-up Information    None         - patient will call their PCP to let them know they were in the emergency department  We discuss return precautions       - additional tx intended, if consistent with primary provider:  - patient to follow with :      Discharge Medication List as of 2022 12:53 AM      CONTINUE these medications which have NOT CHANGED    Details   ciprofloxacin (CIPRO) 500 mg tablet Starting 2022, Historical Med      DULoxetine (CYMBALTA) 60 mg delayed release capsule Take 1 capsule (60 mg total) by mouth daily, Starting Fri 6/10/2022, Normal      hydrOXYzine HCL (ATARAX) 25 mg tablet 1-2 po q6hrs prn anxiety, Normal      metroNIDAZOLE (FLAGYL) 500 mg tablet Starting 2022, Historical Med      sulfaSALAzine (AZULFIDINE) 500 mg tablet Take 2 tablets (1,000 mg total) by mouth 2 (two) times a day, Starting 2022, No Print      vancomycin (VANCOCIN) 125 MG capsule Take 1 capsule (125 mg total) by mouth every 12 (twelve) hours for 5 days, Starting 2022, Until Sun 2022, Normal           No discharge procedures on file  Prior to Admission Medications   Prescriptions Last Dose Informant Patient Reported? Taking? DULoxetine (CYMBALTA) 60 mg delayed release capsule   No No   Sig: Take 1 capsule (60 mg total) by mouth daily   ciprofloxacin (CIPRO) 500 mg tablet   Yes No   hydrOXYzine HCL (ATARAX) 25 mg tablet   No No   Si-2 po q6hrs prn anxiety   metroNIDAZOLE (FLAGYL) 500 mg tablet   Yes No   sulfaSALAzine (AZULFIDINE) 500 mg tablet   No No   Sig: Take 2 tablets (1,000 mg total) by mouth 2 (two) times a day   vancomycin (VANCOCIN) 125 MG capsule   No No   Sig: Take 1 capsule (125 mg total) by mouth every 12 (twelve) hours for 5 days      Facility-Administered Medications: None       Portions of the record may have been created with voice recognition software   Occasional wrong word or "sound a like" substitutions may have occurred due to the inherent limitations of voice recognition software  Read the chart carefully and recognize, using context, where substitutions have occurred      Electronically signed by:  MD Calvin Owen MD  06/12/22 4876

## 2022-06-23 ENCOUNTER — TELEPHONE (OUTPATIENT)
Dept: FAMILY MEDICINE CLINIC | Facility: CLINIC | Age: 27
End: 2022-06-23

## 2022-06-23 NOTE — RESULT ENCOUNTER NOTE
Please call the patient regarding his result  Stomach biopsies normal   No evidence of infection    Follow-up with me

## 2022-06-24 NOTE — TELEPHONE ENCOUNTER
----- Message from Stefanie Felton DO sent at 6/1/2022 11:52 PM EDT -----  Regarding: RE: Discharge  Thank you for allowing us to participate in the care of your patient, Alis Blanton, who was hospitalized from 5/30/2022 through 6/1/2022 with the admitting diagnosis of pouchitis, rectal obstruction on imaging  Underwent pouchoscopy by GI and tx with abx  Abd pain improved and pt advised to f/u with his docs at [facility]      If you have any additional questions or would like to discuss further, please feel free to contact me      Liu Lagunas [facility] Internal Medicine, Hospitalist  [phone]

## 2022-07-01 ENCOUNTER — TELEPHONE (OUTPATIENT)
Dept: FAMILY MEDICINE CLINIC | Facility: CLINIC | Age: 27
End: 2022-07-01

## 2022-07-01 DIAGNOSIS — M45.9 ANKYLOSING SPONDYLITIS, UNSPECIFIED SITE OF SPINE (HCC): Primary | ICD-10-CM

## 2022-07-01 RX ORDER — METHYLPREDNISOLONE 4 MG/1
TABLET ORAL
Qty: 21 TABLET | Refills: 0 | Status: ON HOLD | OUTPATIENT
Start: 2022-07-01 | End: 2022-07-08

## 2022-07-01 NOTE — TELEPHONE ENCOUNTER
DR Christian Ferrera  Patient said he is having a flare up of a spondylosis? He said his Rhuem is out of the office and is asking for prednisone? Please advise

## 2022-07-01 NOTE — TELEPHONE ENCOUNTER
Let him know I can sent a medrol pack but if he wants anything besides that, he will need to contact his rheumatologist when she is available

## 2022-07-06 ENCOUNTER — APPOINTMENT (EMERGENCY)
Dept: RADIOLOGY | Facility: HOSPITAL | Age: 27
End: 2022-07-06
Payer: COMMERCIAL

## 2022-07-06 ENCOUNTER — HOSPITAL ENCOUNTER (OUTPATIENT)
Facility: HOSPITAL | Age: 27
Setting detail: OBSERVATION
Discharge: HOME | End: 2022-07-08
Attending: EMERGENCY MEDICINE | Admitting: SURGERY
Payer: COMMERCIAL

## 2022-07-06 DIAGNOSIS — R10.9 INTRACTABLE ABDOMINAL PAIN: ICD-10-CM

## 2022-07-06 DIAGNOSIS — M45.9 ANKYLOSING SPONDYLITIS, UNSPECIFIED SITE OF SPINE (HCC): ICD-10-CM

## 2022-07-06 DIAGNOSIS — R10.13 EPIGASTRIC ABDOMINAL PAIN: ICD-10-CM

## 2022-07-06 DIAGNOSIS — K56.609 SMALL BOWEL OBSTRUCTION (HCC): Primary | ICD-10-CM

## 2022-07-06 PROBLEM — K56.600 PARTIAL SMALL BOWEL OBSTRUCTION (HCC): Status: ACTIVE | Noted: 2022-07-06

## 2022-07-06 LAB
ALBUMIN SERPL BCP-MCNC: 4.1 G/DL (ref 3.5–5)
ALP SERPL-CCNC: 103 U/L (ref 46–116)
ALT SERPL W P-5'-P-CCNC: 43 U/L (ref 12–78)
ANION GAP SERPL CALCULATED.3IONS-SCNC: 13 MMOL/L (ref 4–13)
ANISOCYTOSIS BLD QL SMEAR: PRESENT
APTT PPP: 26 SECONDS (ref 23–37)
AST SERPL W P-5'-P-CCNC: 20 U/L (ref 5–45)
BASOPHILS # BLD MANUAL: 0 THOUSAND/UL (ref 0–0.1)
BASOPHILS NFR MAR MANUAL: 0 % (ref 0–1)
BILIRUB SERPL-MCNC: 0.62 MG/DL (ref 0.2–1)
BUN SERPL-MCNC: 18 MG/DL (ref 5–25)
CALCIUM SERPL-MCNC: 10.1 MG/DL (ref 8.3–10.1)
CHLORIDE SERPL-SCNC: 101 MMOL/L (ref 100–108)
CO2 SERPL-SCNC: 24 MMOL/L (ref 21–32)
CREAT SERPL-MCNC: 1.13 MG/DL (ref 0.6–1.3)
DACRYOCYTES BLD QL SMEAR: PRESENT
EOSINOPHIL # BLD MANUAL: 0 THOUSAND/UL (ref 0–0.4)
EOSINOPHIL NFR BLD MANUAL: 0 % (ref 0–6)
ERYTHROCYTE [DISTWIDTH] IN BLOOD BY AUTOMATED COUNT: 18.7 % (ref 11.6–15.1)
GFR SERPL CREATININE-BSD FRML MDRD: 87 ML/MIN/1.73SQ M
GLUCOSE SERPL-MCNC: 112 MG/DL (ref 65–140)
HCT VFR BLD AUTO: 40.8 % (ref 36.5–49.3)
HELMET CELLS BLD QL SMEAR: PRESENT
HGB BLD-MCNC: 12.1 G/DL (ref 12–17)
HYPERCHROMIA BLD QL SMEAR: PRESENT
INR PPP: 1 (ref 0.84–1.19)
LACTATE SERPL-SCNC: 1.5 MMOL/L (ref 0.5–2)
LIPASE SERPL-CCNC: 85 U/L (ref 73–393)
LYMPHOCYTES # BLD AUTO: 0.96 THOUSAND/UL (ref 0.6–4.47)
LYMPHOCYTES # BLD AUTO: 7 % (ref 14–44)
MCH RBC QN AUTO: 18.7 PG (ref 26.8–34.3)
MCHC RBC AUTO-ENTMCNC: 29.7 G/DL (ref 31.4–37.4)
MCV RBC AUTO: 63 FL (ref 82–98)
MONOCYTES # BLD AUTO: 0.14 THOUSAND/UL (ref 0–1.22)
MONOCYTES NFR BLD: 1 % (ref 4–12)
NEUTROPHILS # BLD MANUAL: 12.59 THOUSAND/UL (ref 1.85–7.62)
NEUTS BAND NFR BLD MANUAL: 3 % (ref 0–8)
NEUTS SEG NFR BLD AUTO: 89 % (ref 43–75)
PLATELET # BLD AUTO: 447 THOUSANDS/UL (ref 149–390)
PLATELET BLD QL SMEAR: ADEQUATE
PMV BLD AUTO: 8.7 FL (ref 8.9–12.7)
POLYCHROMASIA BLD QL SMEAR: PRESENT
POTASSIUM SERPL-SCNC: 3.8 MMOL/L (ref 3.5–5.3)
PROT SERPL-MCNC: 7.9 G/DL (ref 6.4–8.2)
PROTHROMBIN TIME: 13 SECONDS (ref 11.6–14.5)
RBC # BLD AUTO: 6.48 MILLION/UL (ref 3.88–5.62)
RBC MORPH BLD: PRESENT
SARS-COV-2 RNA RESP QL NAA+PROBE: NEGATIVE
SODIUM SERPL-SCNC: 138 MMOL/L (ref 136–145)
WBC # BLD AUTO: 13.69 THOUSAND/UL (ref 4.31–10.16)

## 2022-07-06 PROCEDURE — 85007 BL SMEAR W/DIFF WBC COUNT: CPT | Performed by: EMERGENCY MEDICINE

## 2022-07-06 PROCEDURE — 96376 TX/PRO/DX INJ SAME DRUG ADON: CPT

## 2022-07-06 PROCEDURE — 99214 OFFICE O/P EST MOD 30 MIN: CPT | Performed by: SURGERY

## 2022-07-06 PROCEDURE — 74176 CT ABD & PELVIS W/O CONTRAST: CPT

## 2022-07-06 PROCEDURE — U0005 INFEC AGEN DETEC AMPLI PROBE: HCPCS | Performed by: EMERGENCY MEDICINE

## 2022-07-06 PROCEDURE — 85730 THROMBOPLASTIN TIME PARTIAL: CPT | Performed by: EMERGENCY MEDICINE

## 2022-07-06 PROCEDURE — 99285 EMERGENCY DEPT VISIT HI MDM: CPT

## 2022-07-06 PROCEDURE — 83690 ASSAY OF LIPASE: CPT | Performed by: EMERGENCY MEDICINE

## 2022-07-06 PROCEDURE — 96374 THER/PROPH/DIAG INJ IV PUSH: CPT

## 2022-07-06 PROCEDURE — 85610 PROTHROMBIN TIME: CPT | Performed by: EMERGENCY MEDICINE

## 2022-07-06 PROCEDURE — 99283 EMERGENCY DEPT VISIT LOW MDM: CPT | Performed by: EMERGENCY MEDICINE

## 2022-07-06 PROCEDURE — 96361 HYDRATE IV INFUSION ADD-ON: CPT

## 2022-07-06 PROCEDURE — U0003 INFECTIOUS AGENT DETECTION BY NUCLEIC ACID (DNA OR RNA); SEVERE ACUTE RESPIRATORY SYNDROME CORONAVIRUS 2 (SARS-COV-2) (CORONAVIRUS DISEASE [COVID-19]), AMPLIFIED PROBE TECHNIQUE, MAKING USE OF HIGH THROUGHPUT TECHNOLOGIES AS DESCRIBED BY CMS-2020-01-R: HCPCS | Performed by: EMERGENCY MEDICINE

## 2022-07-06 PROCEDURE — G1004 CDSM NDSC: HCPCS

## 2022-07-06 PROCEDURE — 96375 TX/PRO/DX INJ NEW DRUG ADDON: CPT

## 2022-07-06 PROCEDURE — 83605 ASSAY OF LACTIC ACID: CPT | Performed by: EMERGENCY MEDICINE

## 2022-07-06 PROCEDURE — 36415 COLL VENOUS BLD VENIPUNCTURE: CPT | Performed by: EMERGENCY MEDICINE

## 2022-07-06 PROCEDURE — C9113 INJ PANTOPRAZOLE SODIUM, VIA: HCPCS | Performed by: PHYSICIAN ASSISTANT

## 2022-07-06 PROCEDURE — 80053 COMPREHEN METABOLIC PANEL: CPT | Performed by: EMERGENCY MEDICINE

## 2022-07-06 PROCEDURE — 85027 COMPLETE CBC AUTOMATED: CPT | Performed by: EMERGENCY MEDICINE

## 2022-07-06 RX ORDER — METHYLPREDNISOLONE 4 MG/1
2 TABLET ORAL DAILY
Status: DISCONTINUED | OUTPATIENT
Start: 2022-07-11 | End: 2022-07-08 | Stop reason: HOSPADM

## 2022-07-06 RX ORDER — HYDROMORPHONE HCL/PF 1 MG/ML
1 SYRINGE (ML) INJECTION ONCE
Status: COMPLETED | OUTPATIENT
Start: 2022-07-06 | End: 2022-07-06

## 2022-07-06 RX ORDER — FENTANYL CITRATE 50 UG/ML
50 INJECTION, SOLUTION INTRAMUSCULAR; INTRAVENOUS ONCE
Status: COMPLETED | OUTPATIENT
Start: 2022-07-06 | End: 2022-07-06

## 2022-07-06 RX ORDER — DEXTROSE, SODIUM CHLORIDE, AND POTASSIUM CHLORIDE 5; .45; .15 G/100ML; G/100ML; G/100ML
100 INJECTION INTRAVENOUS CONTINUOUS
Status: DISCONTINUED | OUTPATIENT
Start: 2022-07-06 | End: 2022-07-08 | Stop reason: HOSPADM

## 2022-07-06 RX ORDER — PANTOPRAZOLE SODIUM 40 MG/10ML
40 INJECTION, POWDER, LYOPHILIZED, FOR SOLUTION INTRAVENOUS EVERY 12 HOURS SCHEDULED
Status: DISCONTINUED | OUTPATIENT
Start: 2022-07-06 | End: 2022-07-08 | Stop reason: HOSPADM

## 2022-07-06 RX ORDER — HYDROXYZINE HYDROCHLORIDE 25 MG/1
25 TABLET, FILM COATED ORAL EVERY 6 HOURS PRN
Status: DISCONTINUED | OUTPATIENT
Start: 2022-07-06 | End: 2022-07-08 | Stop reason: HOSPADM

## 2022-07-06 RX ORDER — METHYLPREDNISOLONE 4 MG/1
16 TABLET ORAL DAILY
Status: COMPLETED | OUTPATIENT
Start: 2022-07-07 | End: 2022-07-07

## 2022-07-06 RX ORDER — SUCRALFATE 1 G/1
1 TABLET ORAL
Status: DISCONTINUED | OUTPATIENT
Start: 2022-07-06 | End: 2022-07-08 | Stop reason: HOSPADM

## 2022-07-06 RX ORDER — METHYLPREDNISOLONE 4 MG/1
8 TABLET ORAL DAILY
Status: DISCONTINUED | OUTPATIENT
Start: 2022-07-09 | End: 2022-07-08 | Stop reason: HOSPADM

## 2022-07-06 RX ORDER — METHYLPREDNISOLONE 4 MG/1
20 TABLET ORAL DAILY
Status: COMPLETED | OUTPATIENT
Start: 2022-07-06 | End: 2022-07-06

## 2022-07-06 RX ORDER — HYDROMORPHONE HCL/PF 1 MG/ML
0.5 SYRINGE (ML) INJECTION EVERY 4 HOURS PRN
Status: DISCONTINUED | OUTPATIENT
Start: 2022-07-06 | End: 2022-07-06

## 2022-07-06 RX ORDER — MORPHINE SULFATE 10 MG/ML
6 INJECTION, SOLUTION INTRAMUSCULAR; INTRAVENOUS ONCE
Status: COMPLETED | OUTPATIENT
Start: 2022-07-06 | End: 2022-07-06

## 2022-07-06 RX ORDER — DULOXETIN HYDROCHLORIDE 60 MG/1
60 CAPSULE, DELAYED RELEASE ORAL DAILY
Status: DISCONTINUED | OUTPATIENT
Start: 2022-07-06 | End: 2022-07-08 | Stop reason: HOSPADM

## 2022-07-06 RX ORDER — ONDANSETRON 2 MG/ML
INJECTION INTRAMUSCULAR; INTRAVENOUS
Status: COMPLETED
Start: 2022-07-06 | End: 2022-07-06

## 2022-07-06 RX ORDER — HYDROMORPHONE HCL/PF 1 MG/ML
0.5 SYRINGE (ML) INJECTION EVERY 2 HOUR PRN
Status: DISCONTINUED | OUTPATIENT
Start: 2022-07-06 | End: 2022-07-08 | Stop reason: HOSPADM

## 2022-07-06 RX ORDER — METHYLPREDNISOLONE 4 MG/1
12 TABLET ORAL DAILY
Status: COMPLETED | OUTPATIENT
Start: 2022-07-08 | End: 2022-07-08

## 2022-07-06 RX ORDER — ONDANSETRON 2 MG/ML
4 INJECTION INTRAMUSCULAR; INTRAVENOUS EVERY 6 HOURS PRN
Status: DISCONTINUED | OUTPATIENT
Start: 2022-07-06 | End: 2022-07-08 | Stop reason: HOSPADM

## 2022-07-06 RX ORDER — ONDANSETRON 2 MG/ML
4 INJECTION INTRAMUSCULAR; INTRAVENOUS ONCE
Status: COMPLETED | OUTPATIENT
Start: 2022-07-06 | End: 2022-07-06

## 2022-07-06 RX ORDER — METHYLPREDNISOLONE 4 MG/1
4 TABLET ORAL DAILY
Status: DISCONTINUED | OUTPATIENT
Start: 2022-07-10 | End: 2022-07-08 | Stop reason: HOSPADM

## 2022-07-06 RX ORDER — SULFASALAZINE 500 MG/1
1000 TABLET ORAL 2 TIMES DAILY
Status: DISCONTINUED | OUTPATIENT
Start: 2022-07-06 | End: 2022-07-06

## 2022-07-06 RX ADMIN — DEXTROSE, SODIUM CHLORIDE, AND POTASSIUM CHLORIDE 100 ML/HR: 5; .45; .15 INJECTION INTRAVENOUS at 14:09

## 2022-07-06 RX ADMIN — HYDROMORPHONE HYDROCHLORIDE 0.5 MG: 1 INJECTION, SOLUTION INTRAMUSCULAR; INTRAVENOUS; SUBCUTANEOUS at 21:28

## 2022-07-06 RX ADMIN — HYDROMORPHONE HYDROCHLORIDE 1 MG: 1 INJECTION, SOLUTION INTRAMUSCULAR; INTRAVENOUS; SUBCUTANEOUS at 03:34

## 2022-07-06 RX ADMIN — HYDROMORPHONE HYDROCHLORIDE 1 MG: 1 INJECTION, SOLUTION INTRAMUSCULAR; INTRAVENOUS; SUBCUTANEOUS at 06:29

## 2022-07-06 RX ADMIN — ONDANSETRON 4 MG: 2 INJECTION INTRAMUSCULAR; INTRAVENOUS at 03:25

## 2022-07-06 RX ADMIN — HYDROMORPHONE HYDROCHLORIDE 1 MG: 1 INJECTION, SOLUTION INTRAMUSCULAR; INTRAVENOUS; SUBCUTANEOUS at 05:25

## 2022-07-06 RX ADMIN — PANTOPRAZOLE SODIUM 40 MG: 40 INJECTION, POWDER, FOR SOLUTION INTRAVENOUS at 20:59

## 2022-07-06 RX ADMIN — HYDROMORPHONE HYDROCHLORIDE 1 MG: 1 INJECTION, SOLUTION INTRAMUSCULAR; INTRAVENOUS; SUBCUTANEOUS at 09:45

## 2022-07-06 RX ADMIN — SODIUM CHLORIDE 1000 ML: 0.9 INJECTION, SOLUTION INTRAVENOUS at 03:33

## 2022-07-06 RX ADMIN — FENTANYL CITRATE 50 MCG: 50 INJECTION INTRAMUSCULAR; INTRAVENOUS at 04:26

## 2022-07-06 RX ADMIN — MORPHINE SULFATE 6 MG: 10 INJECTION INTRAVENOUS at 03:25

## 2022-07-06 RX ADMIN — HYDROMORPHONE HYDROCHLORIDE 1 MG: 1 INJECTION, SOLUTION INTRAMUSCULAR; INTRAVENOUS; SUBCUTANEOUS at 07:56

## 2022-07-06 RX ADMIN — ONDANSETRON 4 MG: 2 INJECTION INTRAMUSCULAR; INTRAVENOUS at 18:35

## 2022-07-06 RX ADMIN — METHYLPREDNISOLONE 20 MG: 4 TABLET ORAL at 15:13

## 2022-07-06 RX ADMIN — HYDROMORPHONE HYDROCHLORIDE 0.5 MG: 1 INJECTION, SOLUTION INTRAMUSCULAR; INTRAVENOUS; SUBCUTANEOUS at 18:31

## 2022-07-06 RX ADMIN — SODIUM CHLORIDE 1000 ML: 0.9 INJECTION, SOLUTION INTRAVENOUS at 09:13

## 2022-07-06 RX ADMIN — DULOXETINE HYDROCHLORIDE 60 MG: 60 CAPSULE, DELAYED RELEASE ORAL at 15:12

## 2022-07-06 RX ADMIN — HYDROMORPHONE HYDROCHLORIDE 0.5 MG: 1 INJECTION, SOLUTION INTRAMUSCULAR; INTRAVENOUS; SUBCUTANEOUS at 14:07

## 2022-07-06 NOTE — LETTER
[address] [facility] [address]  Dept: [phone]    July 8, 2022     Patient: Rodrick Miguel   YOB: 1995   Date of Visit: 7/6/2022       To Whom it May Concern:    Lawrence Mckee is under my professional care  He was seen in the hospital from 7/6/2022   to 07/08/22  He may return to work on 7/11/22 without limitations  If you have any questions or concerns, please don't hesitate to call           Sincerely,          Eleni Ochoa PA-C

## 2022-07-06 NOTE — ED CARE HANDOFF
Emergency Department Sign Out Note        Sign out and transfer of care from previous provider  See Separate Emergency Department note  The patient, Christina Marcos, was evaluated by the previous provider for abdominal pain  Workup Completed:  Blood work, CT abdomen/pelvis    ED Course / Workup Pending (followup):  Pending transfer to [facility]                                  ED Course as of 07/06/22 1145   Wed Jul 06, 2022   8596 Patient has an assigned bed at [facility] with his surgical team however patient is refusing ambulance transport as his ambulance ride was not covered by his insurance last time  I spoke with the accepting physician at [facility], Dr Jacklyn Gandhi, who knows that if the patient is stable and his pain is controlled then patient can be driven by family to [facility] emergency department and then be admitted from there  At this time patient will be discharged with family for private transport to [facility]  Patient is stable at this time  His pain is controlled with medication  1100 Patient requested to speak to me  Patient states that he is starting to feel better  He had a bowel movement  His stomach feels less distended  At this time he would like to be observed at our hospital overnight as he thinks that the SBO is self-resolving  Will reach out to our general surgeon on-call  [address] with general surgeon on-call, Dr Brady Alanis, who accepts patient to admission for observation to General surgery Service  Procedures  MDM  Number of Diagnoses or Management Options  Risk of Complications, Morbidity, and/or Mortality  General comments: Patient was accepted to his own surgical team at [facility] prior to my arrival   Once a bed was assigned, patient stated that he would like family to transport him as his ambulance transfer was not covered by his insurance last time and he has known left with a large payment    I spoke with the accepting physician at [facility], Dr Jacklyn Gandhi, who knows that if the patient is stable and his pain is controlled then patient can be driven by family to [facility] emergency department and then be admitted from there  There was plan to discharge patient with his father to go to [facility] for further evaluation of his small bowel obstruction  While patient was waiting to be picked up by his father, patient finally started to feel better  Patient was passing gas and had a bowel movement  At this time patient requested to be admitted to our hospital for observation as he thinks that his small-bowel obstruction is resolving on its own  Spoke with general surgeon on-call, Dr Nicki Reyes, who accepts patient to admission for observation to General surgery Service  Patient agrees with admission plans  Patient Progress  Patient progress: stable          Disposition  Final diagnoses:   Small bowel obstruction ([address] )   Intractable abdominal pain     Time reflects when diagnosis was documented in both MDM as applicable and the Disposition within this note     Time User Action Codes Description Comment    7/6/2022  9:08 AM Bia Muskrat Add [F96 313] Small bowel obstruction ([address] )     7/6/2022  9:09 AM Bia Muskrat Add [R10 9] Intractable abdominal pain       ED Disposition     ED Disposition   Admit    Condition   Stable    Date/Time   Wed Jul 6, 2022 11:36 AM    Comment   Case discussed with general surgeon, Dr Nicki Reyes, who accepts patient to observation admission to general surgery service             MD Documentation    Rick Mccray Most Recent Value   Patient Condition The patient has been stabilized such that within reasonable medical probability, no material deterioration of the patient condition or the condition of the unborn child(gonzalez) is likely to result from the transfer   Reason for Transfer Level of Care needed not available at this facility   Benefits of Transfer Specialized equipment and/or services available at the receiving facility (Include comment)________________________   Risks of Transfer Potential for delay in receiving treatment, Potential deterioration of medical condition, Increased discomfort during transfer, Possible worsening of condition or death during transfer   Accepting Physician Dr Sandhya Menard Name, Jaleel Lopes by [place] and Unit #) Vernal Button   Sending MD Dr David Hatch   Provider Certification General risk, such as traffic hazards, adverse weather conditions, rough terrain or turbulence, possible failure of equipment (including vehicle or aircraft), or consequences of actions of persons outside the control of the transport personnel, Unanticipated needs of medical equipment and personnel during transport, Risk of worsening condition      RN Documentation    [address] Name, Jaleel Lopes by [place] and Unit #) [facility]      Follow-up Information    None       Patient's Medications   Discharge Prescriptions    No medications on file     No discharge procedures on file         ED Provider  Electronically Signed by     Kurt Linder, DO  07[facility]06/22 [facility], [facility]  07/06/22 Shreya 36, DO  07/06/22 5429

## 2022-07-06 NOTE — EMTALA/ACUTE CARE TRANSFER
[address] [address]  Dept: [phone]      EMTALA TRANSFER CONSENT    NAME Christina Marcos                                         1995                              MRN [id number]    I have been informed of my rights regarding examination, treatment, and transfer   by Dr Jamaal Rojo DO    Benefits: Specialized equipment and/or services available at the receiving facility (Include comment)________________________    Risks: Potential for delay in receiving treatment, Potential deterioration of medical condition, Increased discomfort during transfer, Possible worsening of condition or death during transfer      Consent for Transfer:  I acknowledge that my medical condition has been evaluated and explained to me by the emergency department physician or other qualified medical person and/or my attending physician, who has recommended that I be transferred to the service of  Accepting Physician: Dr Jacklyn Gandhi at [address] [place] [address] Name, [facility] : [facility]  The above potential benefits of such transfer, the potential risks associated with such transfer, and the probable risks of not being transferred have been explained to me, and I fully understand them  The doctor has explained that, in my case, the benefits of transfer outweigh the risks  I agree to be transferred  I authorize the performance of emergency medical procedures and treatments upon me in both transit and upon arrival at the receiving facility  Additionally, I authorize the release of any and all medical records to the receiving facility and request they be transported with me, if possible  I understand that the safest mode of transportation during a medical emergency is an ambulance and that the Hospital advocates the use of this mode of transport   Risks of traveling to the receiving facility by car, including absence of medical control, life sustaining equipment, such as oxygen, and medical personnel has been explained to me and I fully understand them  (MICKEY CORRECT BOX BELOW)  [  ]  I consent to the stated transfer and to be transported by ambulance/helicopter  [  ]  I consent to the stated transfer, but refuse transportation by ambulance and accept full responsibility for my transportation by car  I understand the risks of non-ambulance transfers and I exonerate the Hospital and its staff from any deterioration in my condition that results from this refusal     X___________________________________________    DATE  22  TIME________  Signature of patient or legally responsible individual signing on patient behalf           RELATIONSHIP TO PATIENT_________________________          Provider Certification    NAME Luly Montero                                         1995                              MRN [id number]    A medical screening exam was performed on the above named patient  Based on the examination:    Condition Necessitating Transfer There were no encounter diagnoses      Patient Condition: The patient has been stabilized such that within reasonable medical probability, no material deterioration of the patient condition or the condition of the unborn child(gonzalez) is likely to result from the transfer    Reason for Transfer: Level of Care needed not available at this facility    Transfer Requirements: [address]   · Space available and qualified personnel available for treatment as acknowledged by    · Agreed to accept transfer and to provide appropriate medical treatment as acknowledged by       Dr Yessi Waldrop  · Appropriate medical records of the examination and treatment of the patient are provided at the time of transfer   [address], [address] _______  · Transfer will be performed by qualified personnel from [address] [place] [address]  and appropriate transfer equipment as required, including the use of necessary and appropriate life support measures  Provider Certification: I have examined the patient and explained the following risks and benefits of being transferred/refusing transfer to the patient/family:  General risk, such as traffic hazards, adverse weather conditions, rough terrain or turbulence, possible failure of equipment (including vehicle or aircraft), or consequences of actions of persons outside the control of the transport personnel, Unanticipated needs of medical equipment and personnel during transport, Risk of worsening condition      Based on these reasonable risks and benefits to the patient and/or the unborn child(gonzalez), and based upon the information available at the time of the patients examination, I certify that the medical benefits reasonably to be expected from the provision of appropriate medical treatments at another medical facility outweigh the increasing risks, if any, to the individuals medical condition, and in the case of labor to the unborn child, from effecting the transfer      X____________________________________________ DATE 07/06/22        TIME_______      ORIGINAL - SEND TO MEDICAL RECORDS   COPY - SEND WITH PATIENT DURING TRANSFER

## 2022-07-06 NOTE — DISCHARGE INSTRUCTIONS
Take Protonix as directed by pharmacy for 2 weeks and monitor for acid reflux  Attempt ibuprofen or aleve while taking steroids  Follow-up with [facility] surgical provider

## 2022-07-06 NOTE — H&P
H&P Exam - General Surgery   Jeanine Saldana 32 y o  male MRN: [id number]  Unit/Bed#: [address] [place] [address] Encounter: [id number]    Assessment/Plan     Assessment:  1) suspected small-bowel obstruction - improving, waxing waning nausea, passing gas now, abdominal pain waxing waning but improved compared to admission, CT exam exhibits dilated small bowel loops in the upper quadrants up to 7 cm in transition point near area of small-bowel anastomosis, suspecting that small-bowel anastomosis proximally is secondary to reversed diverting loop, some of small bowel may be dilated chronically due to colonic resection, patient feels distended but has no tympany on percussion, minimal distention noted on direct observation, abdomen soft, minimal guarding, active bowel sounds  2) heartburn/dyspepsia - patient reports sensation of heartburn in the epigastric region with almost a burning in the chest a little bit that waxes and wanes, patient does not experience typically GERD   3) history of ulcerative colitis with total colectomy and J-pouch - has ileal pouch and end-to-end anastomosis, and end anastomosis has been prone to experiencing stricture and needing endoscopy with balloon angioplasty in the past, has had recent endoscopy and evaluation an end anastomosis consent last 2 months, no acute exacerbations of ulcerative colitis  4) ankylosing spondylitis - was experiencing recent acute exacerbation with lower back pain, on steroid taper    Plan:  1-3)   - continue Medrol Dosepak   - IV fluids with 125 mL/hour D5 half-normal saline with 20 mEq potassium  - restarted Atarax p r n  for anxiety  - started Cymbalta  - NPO except sips for meds  - p r n  Dilaudid 0 5 mg for abdominal pain  - p r n   Zofran  - a m  labs with CBC, BMP, Mag, phos  - I/Os  - will forego NG tube as of now  - patient Education  - reviewed CT  - serial abdominal exams    History of Present Illness   HPI:  Jeanine Saldana is a 32 y o  male with a past medical history pertinent for total colectomy in 2015, diagnosis of ulcerative colitis in 2012, C diff colitis in 2015, ileal pouch with end-to-end anastomosis, significant endoscopy with balloon dilation of and end anastomosis as needed presenting to the acute care surgery team due to onset of significant abdominal pain  Patient reports that he was just coming off shift when he started having the onset of acute abdominal pain the starts in the epigastric region and is generalized across all quadrants but does refer down into the deep lower left quadrant almost as if it feels like it is in his groin  Patient reports that he is had small-bowel obstructions in the past due to his prior surgical history and that this feels very similar  Patient reports that the pain is almost identical every time with the exception of fact that at this time he does have in his epigastric region some heartburn which is not his typical   Patient reports that the pain is waxing and waning then diffuse in nature with his most focal area down into the left lower quadrant  Patient in addition to this does have some heartburn into the chest with a sensation of acid reflux  Patient does have waxing waning nausea but at this point his seemingly improved since admission  Patient has not had any vomiting  Patient does typically have loose bowel movements due to his total colectomy but was actually having reduced amount of loose bowel movements compared to his normal   Patient thought as a result that he was likely having some degree of small-bowel obstruction  Patient also was not passing gas  Patient noticed that the pain was continuing to worsen over 3-4 hours  It was at this point that patient presents to the emergency department for evaluation  Patient after being held in the emergency department does note that he is seen some improvement any has been passing gas now    Patient has not had a return of his bowel movements yet but does have diminished component of nausea and pain in the abdomen  Patient denies any sort of recent antibiotic exposure or recurrence of C diff  Patient denies any sort of recent dietary changes or new foods  Patient denies any recent outdoor eating with  or contaminated food products that he is aware of  Patient denies any recent camping trips  Patient denies any recent travel outside the country  Patient denies any sort of history of buffet type food that could be spoiled  Patient in addition to all this has been on recent  Medrol Dosepak and taking NSAIDs for his back pain  Patient was thinking this could be a contributing factor to his acid reflux type sensation  Patient is having slight blood tinge in his bowel movements on occasion but very infrequently  Patient has had an endoscopy within the last 2 months to evaluate his end-to-end anastomosis did not find abnormalities regarding has bleeding with stools  Patient otherwise does not have any other complaints  Please refer to review of systems  Review of Systems   Constitutional: Positive for appetite change  Negative for chills, fatigue and fever  HENT: Negative for congestion and rhinorrhea  Respiratory: Negative for cough, chest tightness, shortness of breath and wheezing  Cardiovascular: Negative for chest pain, palpitations and leg swelling  Gastrointestinal: Positive for abdominal distention, abdominal pain, blood in stool and nausea  Negative for constipation, diarrhea and vomiting  Genitourinary: Negative for difficulty urinating, dysuria and hematuria  Musculoskeletal: Positive for back pain  Negative for arthralgias and gait problem  Skin: Negative for color change and wound  Neurological: Negative for syncope, weakness and numbness  Psychiatric/Behavioral: Negative for agitation and confusion         Historical Information   Past Medical History:   Diagnosis Date    [facility]     Anxiety     [facility]el obstruction ([facility] )     Clostridium difficile colitis 9/13/2018    Colitis     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[facility] ) 9/13/2018    Pancreatitis     Ulcerative colitis ([facility] )      Past Surgical History:   Procedure Laterality Date    COLECTOMY TOTAL      with ileal pouch and anastemosis    IR PICC PLACEMENT SINGLE LUMEN  3/1/2022    TOTAL COLECTOMY       Social History   Social History     Substance and Sexual Activity   Alcohol Use Yes    Comment: pt states 5 a month/socially     Social History     Substance and Sexual Activity   Drug Use No     Social History     Tobacco Use   Smoking Status Never Smoker   Smokeless Tobacco Never Used     E-Cigarette/Vaping    E-Cigarette Use Never User      E-Cigarette/Vaping Substances    Nicotine No     THC No     CBD No     Flavoring No     Other No     Unknown No      Family History: non-contributory    Meds/Allergies   all medications and allergies reviewed  Allergies   Allergen Reactions    Mesalamine GI Intolerance     Other reaction(s): Pancreatitis  Annotation - [id number]: pancreatitis    Cefazolin Hives       Objective   First Vitals:   Blood Pressure: (!) 148/111 (07/06/22 0314)  Pulse: 57 (07/06/22 0314)  Temperature: 98 3 °F (36 8 °C) (07/06/22 0314)  Temp Source: Oral (07/06/22 0314)  Respirations: 20 (07/06/22 0314)  Height: 5' 9" (175 3 cm) (07/06/22 0314)  Weight - Scale: 81 6 kg (180 lb) (07/06/22 0314)  SpO2: 98 % (07/06/22 0314)    Current Vitals:   Blood Pressure: 109/55 (07/06/22 1239)  Pulse: 89 (07/06/22 1239)  Temperature: 97 9 °F (36 6 °C) (07/06/22 1239)  Temp Source: Oral (07/06/22 1239)  Respirations: 18 (07/06/22 1239)  Height: 5' 9" (175 3 cm) (07/06/22 0314)  Weight - Scale: 81 6 kg (180 lb) (07/06/22 0314)  SpO2: 96 % (07/06/22 1239)      Intake/Output Summary (Last 24 hours) at 7/6/2022 1441  Last data filed at 7/6/2022 0500  Gross per 24 hour   Intake 1000 ml   Output --   Net 1000 ml       Invasive Devices  Report    Peripheral Intravenous Line  Duration           Peripheral IV 07/06/22 Left Antecubital <1 day                Physical Exam  Constitutional:       General: He is awake  He is not in acute distress  Appearance: Normal appearance  He is normal weight  He is not ill-appearing, toxic-appearing or diaphoretic  Interventions: He is not intubated  HENT:      Head: Normocephalic and atraumatic  Right Ear: Hearing and external ear normal  No decreased hearing noted  Left Ear: Hearing and external ear normal  No decreased hearing noted  Nose: Nose normal    Cardiovascular:      Rate and Rhythm: Normal rate and regular rhythm  Heart sounds: Normal heart sounds, S1 normal and S2 normal  Heart sounds not distant  No murmur heard  Pulmonary:      Effort: Pulmonary effort is normal  No tachypnea, bradypnea, accessory muscle usage, prolonged expiration, respiratory distress or retractions  He is not intubated  Breath sounds: Normal breath sounds  No stridor, decreased air movement or transmitted upper airway sounds  No decreased breath sounds, wheezing, rhonchi or rales  Abdominal:      General: Abdomen is flat  Bowel sounds are normal  There is no distension  Palpations: Abdomen is soft  Tenderness: There is generalized abdominal tenderness and tenderness in the left upper quadrant and left lower quadrant  There is no guarding or rebound  Skin:     General: Skin is warm and dry  Capillary Refill: Capillary refill takes less than 2 seconds  Neurological:      General: No focal deficit present  Mental Status: He is alert and oriented to person, place, and time  Sensory: Sensation is intact  Motor: Motor function is intact  Psychiatric:         Behavior: Behavior is cooperative  Lab Results:   I have personally reviewed pertinent lab results    , CBC:   Lab Results   Component Value Date    WBC 13 69 (H) 07/06/2022    HGB 12 1 07/06/2022    HCT 40 8 07/06/2022    MCV 63 (L) 07/06/2022     (H) 07/06/2022    MCH 18 7 (L) 07/06/2022    MCHC 29 7 (L) 07/06/2022    RDW 18 7 (H) 07/06/2022    MPV 8 7 (L) 07/06/2022   , CMP:   Lab Results   Component Value Date    SODIUM 138 07/06/2022    K 3 8 07/06/2022     07/06/2022    CO2 24 07/06/2022    BUN 18 07/06/2022    CREATININE 1 13 07/06/2022    CALCIUM 10 1 07/06/2022    AST 20 07/06/2022    ALT 43 07/06/2022    ALKPHOS 103 07/06/2022    EGFR 87 07/06/2022     Imaging: I have personally reviewed pertinent reports  EKG, Pathology, and Other Studies: I have personally reviewed pertinent reports  Code Status: Level 1 - Full Code    Counseling / Coordination of Care  Total floor / unit time spent today 45 minutes  Greater than 50% of total time was spent with the patient and / or family counseling and / or coordination of care  A description of the counseling / coordination of care:  Developing plan, reviewing with attending, coordinating care, physical exam, history taking, reviewing labs, reviewing imaging

## 2022-07-06 NOTE — ED NOTES
Pt  In extreme pain and unable to stay in bed  Continues to   [place] back and forth  Gave warm blankets to place over the stomach at this time        Jocelyn Roberson RN  07/06/22 6979

## 2022-07-06 NOTE — ED PROVIDER NOTES
History  Chief Complaint   Patient presents with    Abdominal Pain     Pt  States Pain in abdomen started a few hours ago  Pt  Has a current crones flare up and started   prednisone  2 days ago     HPI    This is a 33-year-old male presents today with abdominal pain  Patient presents with severe abdominal pain  He has history of colectomy and multiple small-bowel obstructions  Last small-bowel obstruction was last month when he got transferred over to [facility] where he had dilation  Patient states today about few hours ago he started getting severe abdominal pain  Currently having a ulcerative colitis flare and is on steroids  States nausea but no vomiting  Difficulty with going to the bathroom  Prior to Admission Medications   Prescriptions Last Dose Informant Patient Reported? Taking?    DULoxetine (CYMBALTA) 60 mg delayed release capsule 2022 at Unknown time  No Yes   Sig: Take 1 capsule (60 mg total) by mouth daily   ciprofloxacin (CIPRO) 500 mg tablet Unknown at Unknown time  Yes No   hydrOXYzine HCL (ATARAX) 25 mg tablet 2022 at Unknown time  No Yes   Si-2 po q6hrs prn anxiety   methylPREDNISolone 4 MG tablet therapy pack 2022 at Unknown time  No Yes   Sig: Use as directed on package   metroNIDAZOLE (FLAGYL) 500 mg tablet Unknown at Unknown time  Yes No   sulfaSALAzine (AZULFIDINE) 500 mg tablet 2022 at Unknown time  No Yes   Sig: Take 2 tablets (1,000 mg total) by mouth 2 (two) times a day      Facility-Administered Medications: None       Past Medical History:   Diagnosis Date    Ankylosing spondylitis (HCC)     Anxiety     Bowel obstruction (HCC)     Clostridium difficile colitis 2018    Colitis     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[facility] [address] ) 2018    Pancreatitis     Ulcerative colitis ([facility] [address] )        Past Surgical History:   Procedure Laterality Date    COLECTOMY TOTAL      with ileal pouch and anastemosis    IR PICC PLACEMENT SINGLE LUMEN  3/1/2022    TOTAL COLECTOMY History reviewed  No pertinent family history  I have reviewed and agree with the history as documented  E-Cigarette/Vaping    E-Cigarette Use Never User      E-Cigarette/Vaping Substances    Nicotine No     THC No     CBD No     Flavoring No     Other No     Unknown No      Social History     Tobacco Use    Smoking status: Never Smoker    Smokeless tobacco: Never Used   Vaping Use    Vaping Use: Never used   Substance Use Topics    Alcohol use: Yes     Comment: pt states 5 a month/socially    Drug use: No       Review of Systems   Constitutional: Negative  Negative for diaphoresis and fever  HENT: Negative  Respiratory: Negative  Negative for cough, shortness of breath and wheezing  Cardiovascular: Negative  Negative for chest pain, palpitations and leg swelling  Gastrointestinal: Positive for abdominal pain, constipation and nausea  Negative for abdominal distention and vomiting  Genitourinary: Negative  Musculoskeletal: Negative  Skin: Negative  Neurological: Negative  Psychiatric/Behavioral: Negative  All other systems reviewed and are negative  Physical Exam  Physical Exam  Vitals and nursing note reviewed  Constitutional:       Appearance: He is well-developed  HENT:      Head: Normocephalic and atraumatic  Eyes:      Conjunctiva/sclera: Conjunctivae normal    Cardiovascular:      Rate and Rhythm: Normal rate and regular rhythm  Heart sounds: No murmur heard  Pulmonary:      Effort: Pulmonary effort is normal  No respiratory distress  Breath sounds: Normal breath sounds  Abdominal:      Palpations: Abdomen is soft  Tenderness: There is generalized abdominal tenderness  Musculoskeletal:      Cervical back: Neck supple  Skin:     General: Skin is warm and dry  Neurological:      Mental Status: He is alert           Vital Signs  ED Triage Vitals [07/06/22 0314]   Temperature Pulse Respirations Blood Pressure SpO2   98 3 °F (36 8 °C) 57 20 (!) 148/111 98 %      Temp Source Heart Rate Source Patient Position - Orthostatic VS BP Location FiO2 (%)   Oral Monitor Sitting Right arm --      Pain Score       10 - Worst Possible Pain           Vitals:    07/07/22 1511 07/07/22 1928 07/07/22 2253 07/08/22 0757   BP: 123/65 113/68 119/69 115/68   Pulse: 69 77 78 74   Patient Position - Orthostatic VS: Lying Lying Lying Lying         Visual Acuity      ED Medications  Medications   sodium chloride 0 9 % bolus 1,000 mL (0 mL Intravenous Stopped 7/6/22 0500)   morphine injection 6 mg (6 mg Intravenous Given 7/6/22 0325)   ondansetron (ZOFRAN) injection 4 mg (4 mg Intravenous Given 7/6/22 0325)   HYDROmorphone (DILAUDID) injection 1 mg (1 mg Intravenous Given 7/6/22 0334)   fentanyl citrate (PF) 100 MCG/2ML 50 mcg (50 mcg Intravenous Given 7/6/22 0426)   HYDROmorphone (DILAUDID) injection 1 mg (1 mg Intravenous Given 7/6/22 0525)   HYDROmorphone (DILAUDID) injection 1 mg (1 mg Intravenous Given 7/6/22 0756)   HYDROmorphone (DILAUDID) injection 1 mg (1 mg Intravenous Given 7/6/22 0629)   HYDROmorphone (DILAUDID) injection 1 mg (1 mg Intravenous Given 7/6/22 0945)   sodium chloride 0 9 % bolus 1,000 mL (0 mL Intravenous Stopped 7/6/22 1242)   methylprednisolone (MEDROL) tablet 20 mg (20 mg Oral Given 7/6/22 1513)     Followed by   methylprednisolone (MEDROL) tablet 16 mg (16 mg Oral Given 7/7/22 0857)     Followed by   methylprednisolone (MEDROL) tablet 12 mg (12 mg Oral Given 7/8/22 0904)       Diagnostic Studies  Results Reviewed     Procedure Component Value Units Date/Time    COVID only [[id number]]  (Normal) Collected: 07/06/22 0533    Lab Status: Final result Specimen: Nares from Nose Updated: 07/06/22 0631     SARS-CoV-2 Negative    Narrative:      FOR PEDIATRIC PATIENTS - copy/paste COVID Guidelines URL to browser: [email or web address]    SARS-CoV-2 assay is a Nucleic Acid Amplification assay intended for the  qualitative detection of nucleic acid from SARS-CoV-2 in nasopharyngeal  swabs  Results are for the presumptive identification of SARS-CoV-2 RNA  Positive results are indicative of infection with SARS-CoV-2, the virus  causing COVID-19, but do not rule out bacterial infection or co-infection  with other viruses  Laboratories within the [place] and its  territories are required to report all positive results to the appropriate  public health authorities  Negative results do not preclude SARS-CoV-2  infection and should not be used as the sole basis for treatment or other  patient management decisions  Negative results must be combined with  clinical observations, patient history, and epidemiological information  This test has not been FDA cleared or approved  This test has been authorized by FDA under an Emergency Use Authorization  (EUA)  This test is only authorized for the duration of time the  declaration that circumstances exist justifying the authorization of the  emergency use of an in vitro diagnostic tests for detection of SARS-CoV-2  virus and/or diagnosis of COVID-19 infection under section 564(b)(1) of  the Act, 21 U  S C  515LWI-4(M)(2), unless the authorization is terminated  or revoked sooner  The test has been validated but independent review by FDA  and CLIA is pending  Test performed using "[employer] GeneXpert: This RT-PCR assay targets N2,  a region unique to SARS-CoV-2  A conserved region in the E-gene was chosen  for pan-Sarbecovirus detection which includes SARS-CoV-2  Lactic acid, plasma [[id number]]  (Normal) Collected: 07/06/22 0527    Lab Status: Final result Specimen: Blood from Arm, Left Updated: 07/06/22 0555     LACTIC ACID 1 5 mmol/L     Narrative:      Result may be elevated if tourniquet was used during collection      Noel Belcher [[id number]]  (Normal) Collected: 07/06/22 0328    Lab Status: Final result Specimen: Blood from Arm, Left Updated: 07/06/22 0405     Protime 13 0 seconds      INR 1 00    APTT [[id number]]  (Normal) Collected: 07/06/22 0328    Lab Status: Final result Specimen: Blood from Arm, Left Updated: 07/06/22 0405     PTT 26 seconds     CBC and differential [[id number]]  (Abnormal) Collected: 07/06/22 0328    Lab Status: Final result Specimen: Blood from Arm, Left Updated: 07/06/22 0352     WBC 13 69 Thousand/uL      RBC 6 48 Million/uL      Hemoglobin 12 1 g/dL      Hematocrit 40 8 %      MCV 63 fL      MCH 18 7 pg      MCHC 29 7 g/dL      RDW 18 7 %      MPV 8 7 fL      Platelets 122 Thousands/uL     Narrative: This is an appended report  These results have been appended to a previously verified report      Manual Differential(PHLEBS Do Not Order) [[id number]]  (Abnormal) Collected: 07/06/22 0328    Lab Status: Final result Specimen: Blood from Arm, Left Updated: 07/06/22 0352     Segmented % 89 %      Bands % 3 %      Lymphocytes % 7 %      Monocytes % 1 %      Eosinophils, % 0 %      Basophils % 0 %      Absolute Neutrophils 12 59 Thousand/uL      Lymphocytes Absolute 0 96 Thousand/uL      Monocytes Absolute 0 14 Thousand/uL      Eosinophils Absolute 0 00 Thousand/uL      Basophils Absolute 0 00 Thousand/uL      Total Counted --     RBC Morphology Present     Anisocytosis Present     Helmet Cells Present     Hypochromia Present     Polychromasia Present     Tear Drop Cells Present     Platelet Estimate Adequate    Lipase [[id number]]  (Normal) Collected: 07/06/22 0328    Lab Status: Final result Specimen: Blood from Arm, Left Updated: 07/06/22 0351     Lipase 85 u/L     Comprehensive metabolic panel [[id number]] Collected: 07/06/22 0328    Lab Status: Final result Specimen: Blood from Arm, Left Updated: 07/06/22 0351     Sodium 138 mmol/L      Potassium 3 8 mmol/L      Chloride 101 mmol/L      CO2 24 mmol/L      ANION GAP 13 mmol/L      BUN 18 mg/dL      Creatinine 1 13 mg/dL      Glucose 112 mg/dL      Calcium 10 1 mg/dL      AST 20 U/L ALT 43 U/L      Alkaline Phosphatase 103 U/L      Total Protein 7 9 g/dL      Albumin 4 1 g/dL      Total Bilirubin 0 62 mg/dL      eGFR 87 ml/min/1 73sq m     Narrative:      [place] guidelines for Chronic Kidney Disease (CKD):     Stage 1 with normal or high GFR (GFR > 90 mL/min/1 73 square meters)    Stage 2 Mild CKD (GFR = 60-89 mL/min/1 73 square meters)    Stage 3A Moderate CKD (GFR = 45-59 mL/min/1 73 square meters)    Stage 3B Moderate CKD (GFR = 30-44 mL/min/1 73 square meters)    Stage 4 Severe CKD (GFR = 15-29 mL/min/1 73 square meters)    Stage 5 End Stage CKD (GFR <15 mL/min/1 73 square meters)  Note: GFR calculation is accurate only with a steady state creatinine                 CT abdomen pelvis wo contrast   Final Result by Raúl Rod MD (07/06 9449)      Abnormally dilated small bowel loops in the upper hemiabdomen measuring up to 7 cm and with air-fluid levels concerning for small bowel obstruction  ?  Transition point at the right upper quadrant surgical anastomosis  No free air  Surgical consult advised  Above findings discussed with Dr Igor Toure at 4:45 AM on 7/6/2022  Workstation performed: [id number]                    Procedures  Procedures         ED Course  ED Course as of 07/11/22 2016 Wed Jul 06, 2022   0336 WBC(!): 13 69  On steroids    0521 Dr Tanner Oliver Accepted by Dr Rufus Knapp from colorectal surgery                                SBIRT 22yo+    Flowsheet Row Most Recent Value   SBIRT (25 yo +)    In order to provide better care to our patients, we are screening all of our patients for alcohol and drug use  Would it be okay to ask you these screening questions?  Unable to answer at this time Filed at: 07/06/2022 0414                    MDM    Disposition  Final diagnoses:   Small bowel obstruction ([address] )   Intractable abdominal pain     Time reflects when diagnosis was documented in both MDM as applicable and the Disposition within this note     Time User Action Codes Description Comment    7/6/2022  9:08 AM Tiff Mccormick Add [Y84 107] Small bowel obstruction ([address] )     7/6/2022  9:09 AM Ny Okeefe Add [R10 9] Intractable abdominal pain     7/8/2022 12:16 PM Mahnaz Necessary Add [R10 13] Epigastric abdominal pain     7/8/2022  1:05 PM Mahnaz Necessary Add [M45 9] Ankylosing spondylitis, unspecified site of spine [facility])       ED Disposition     ED Disposition   Admit    Condition   Stable    Date/Time   Wed Jul 6, 2022 11:36 AM    Comment   Case discussed with general surgeon, Dr Wiley Saez, who accepts patient to observation admission to general surgery service             MD Documentation    Niranjan Contras Most Recent Value   Patient Condition The patient has been stabilized such that within reasonable medical probability, no material deterioration of the patient condition or the condition of the unborn child(gonzalez) is likely to result from the transfer   Reason for Transfer Level of Care needed not available at this facility   Benefits of Transfer Specialized equipment and/or services available at the receiving facility (Include comment)________________________   Risks of Transfer Potential for delay in receiving treatment, Potential deterioration of medical condition, Increased discomfort during transfer, Possible worsening of condition or death during transfer   Accepting Physician Dr Deana Saldivar [facility] and [facility]) Kylee Thomson   Sending MD Dr Diego Renee   Provider Certification General risk, such as traffic hazards, adverse weather conditions, rough terrain or turbulence, possible failure of equipment (including vehicle or aircraft), or consequences of actions of persons outside the control of the transport personnel, Unanticipated needs of medical equipment and personnel during transport, Risk of worsening condition      RN Documentation    Flowsheet Row Most Recent Value   Accepting Facility Name, [address] [place] [address] Ave [facility] and Unit [facility]) [facility]      Follow-up Information    None         Discharge Medication List as of 7/8/2022  1:54 PM      START taking these medications    Details   pantoprazole (PROTONIX) 20 mg tablet Take 1 tablet (20 mg total) by mouth daily, Starting Fri 7/8/2022, Normal         CONTINUE these medications which have CHANGED    Details   methylPREDNISolone 4 MG tablet therapy pack Use as directed on package, Normal         CONTINUE these medications which have NOT CHANGED    Details   DULoxetine (CYMBALTA) 60 mg delayed release capsule Take 1 capsule (60 mg total) by mouth daily, Starting Fri 6/10/2022, Normal      hydrOXYzine HCL (ATARAX) 25 mg tablet 1-2 po q6hrs prn anxiety, Normal      sulfaSALAzine (AZULFIDINE) 500 mg tablet Take 2 tablets (1,000 mg total) by mouth 2 (two) times a day, Starting Wed 6/1/2022, No Print      ciprofloxacin (CIPRO) 500 mg tablet Starting Wed 6/8/2022, Historical Med      metroNIDAZOLE (FLAGYL) 500 mg tablet Starting Wed 6/8/2022, Historical Med             Outpatient Discharge Orders   Discharge Diet     Activity as tolerated     Call provider for:  persistent dizziness or light-headedness     Call provider for:  hives     Call provider for:  difficulty breathing, headache or visual disturbances     Call provider for: active or persistent bleeding     Call provider for:  redness, tenderness, or signs of infection (pain, swelling, redness, odor or green/yellow discharge around incision site)     Call provider for:  severe uncontrolled pain     Call provider for:  persistent nausea or vomiting     No dressing needed       PDMP Review       Value Time User    PDMP Reviewed  Yes 6/7/2022  6:39 PM [address] [facility]          ED Provider  Electronically Signed by           Octavio Hollingsworth MD  07/11/22 2016

## 2022-07-06 NOTE — EMTALA/ACUTE CARE TRANSFER
[address] [facility] [address]  [place] [address]  Dept: [phone]      EMTALA TRANSFER CONSENT    NAME Padilla Fine                                         1995                              MRN [id number]    I have been informed of my rights regarding examination, treatment, and transfer   by Dr Wen Ledesma MD    Benefits: Continuity of care, Patient preference, Specialized equipment and/or services available at the receiving facility (Include comment)________________________    Risks: Potential for delay in receiving treatment, Potential deterioration of medical condition      Transfer Request   I acknowledge that my medical condition has been evaluated and explained to me by the emergency department physician or other qualified medical person and/or my attending physician who has recommended and offered to me further medical examination and treatment  I understand the Hospital's obligation with respect to the treatment and stabilization of my emergency medical condition  I nevertheless request to be transferred  I release the Hospital, the doctor, and any other persons caring for me from all responsibility or liability for any injury or ill effects that may result from my transfer and agree to accept all responsibility for the consequences of my choice to transfer, rather than receive stabilizing treatment at the Hospital  I understand that because the transfer is my request, my insurance may not provide reimbursement for the services  The Hospital will assist and direct me and my family in how to make arrangements for transfer, but the hospital is not liable for any fees charged by the transport service  In spite of this understanding, I refuse to consent to further medical examination and treatment which has been offered to me, and request transfer to  [facility] Name, [address]   I authorize the performance of emergency medical procedures and treatments upon me in both transit and upon arrival at the receiving facility  Additionally, I authorize the release of any and all medical records to the receiving facility and request they be transported with me, if possible  I authorize the performance of emergency medical procedures and treatments upon me in both transit and upon arrival at the receiving facility  Additionally, I authorize the release of any and all medical records to the receiving facility and request they be transported with me, if possible  I understand that the safest mode of transportation during a medical emergency is an ambulance and that the Hospital advocates the use of this mode of transport  Risks of traveling to the receiving facility by car, including absence of medical control, life sustaining equipment, such as oxygen, and medical personnel has been explained to me and I fully understand them  (MICKEY CORRECT BOX BELOW)  [  ]  I consent to the stated transfer and to be transported by ambulance/helicopter  [  ]  I consent to the stated transfer, but refuse transportation by ambulance and accept full responsibility for my transportation by car  I understand the risks of non-ambulance transfers and I exonerate the Hospital and its staff from any deterioration in my condition that results from this refusal     X___________________________________________    DATE  22  TIME________  Signature of patient or legally responsible individual signing on patient behalf           RELATIONSHIP TO PATIENT_________________________          Provider Certification    NAME Gabriella Boss                                        [facility] 1995                              MRN [id number]    A medical screening exam was performed on the above named patient  Based on the examination:    Condition Necessitating Transfer There were no encounter diagnoses      Patient Condition: The patient has been stabilized such that within reasonable medical probability, no material deterioration of the patient condition or the condition of the unborn child(gonzalez) is likely to result from the transfer    Reason for Transfer: Level of Care needed not available at this facility, Patient/Family request    Transfer Requirements: [address]   · Space available and qualified personnel available for treatment as acknowledged by    · Agreed to accept transfer and to provide appropriate medical treatment as acknowledged by       Dr Jeanette Dotson  · Appropriate medical records of the examination and treatment of the patient are provided at the time of transfer   [address] [facility], [address] _______  · Transfer will be performed by qualified personnel from    and appropriate transfer equipment as required, including the use of necessary and appropriate life support measures  Provider Certification: I have examined the patient and explained the following risks and benefits of being transferred/refusing transfer to the patient/family:  General risk, such as traffic hazards, adverse weather conditions, rough terrain or turbulence, possible failure of equipment (including vehicle or aircraft), or consequences of actions of persons outside the control of the transport personnel      Based on these reasonable risks and benefits to the patient and/or the unborn child(gonzalez), and based upon the information available at the time of the patients examination, I certify that the medical benefits reasonably to be expected from the provision of appropriate medical treatments at another medical facility outweigh the increasing risks, if any, to the individuals medical condition, and in the case of labor to the unborn child, from effecting the transfer      X____________________________________________ DATE 07/06/22        TIME_______      ORIGINAL - SEND TO MEDICAL RECORDS   COPY - SEND WITH PATIENT DURING TRANSFER

## 2022-07-07 LAB
ANION GAP SERPL CALCULATED.3IONS-SCNC: 11 MMOL/L (ref 4–13)
BUN SERPL-MCNC: 13 MG/DL (ref 5–25)
CALCIUM SERPL-MCNC: 9 MG/DL (ref 8.3–10.1)
CHLORIDE SERPL-SCNC: 99 MMOL/L (ref 100–108)
CO2 SERPL-SCNC: 26 MMOL/L (ref 21–32)
CREAT SERPL-MCNC: 1.02 MG/DL (ref 0.6–1.3)
ERYTHROCYTE [DISTWIDTH] IN BLOOD BY AUTOMATED COUNT: 18.4 % (ref 11.6–15.1)
GFR SERPL CREATININE-BSD FRML MDRD: 98 ML/MIN/1.73SQ M
GLUCOSE P FAST SERPL-MCNC: 113 MG/DL (ref 65–99)
GLUCOSE SERPL-MCNC: 113 MG/DL (ref 65–140)
HCT VFR BLD AUTO: 38.5 % (ref 36.5–49.3)
HGB BLD-MCNC: 11.6 G/DL (ref 12–17)
MAGNESIUM SERPL-MCNC: 2.1 MG/DL (ref 1.6–2.6)
MCH RBC QN AUTO: 19 PG (ref 26.8–34.3)
MCHC RBC AUTO-ENTMCNC: 30.1 G/DL (ref 31.4–37.4)
MCV RBC AUTO: 63 FL (ref 82–98)
PHOSPHATE SERPL-MCNC: 4.1 MG/DL (ref 2.7–4.5)
PLATELET # BLD AUTO: 413 THOUSANDS/UL (ref 149–390)
PMV BLD AUTO: 8.6 FL (ref 8.9–12.7)
POTASSIUM SERPL-SCNC: 3.9 MMOL/L (ref 3.5–5.3)
RBC # BLD AUTO: 6.1 MILLION/UL (ref 3.88–5.62)
SODIUM SERPL-SCNC: 136 MMOL/L (ref 136–145)
WBC # BLD AUTO: 11.07 THOUSAND/UL (ref 4.31–10.16)

## 2022-07-07 PROCEDURE — 85027 COMPLETE CBC AUTOMATED: CPT | Performed by: PHYSICIAN ASSISTANT

## 2022-07-07 PROCEDURE — 80048 BASIC METABOLIC PNL TOTAL CA: CPT | Performed by: PHYSICIAN ASSISTANT

## 2022-07-07 PROCEDURE — 83735 ASSAY OF MAGNESIUM: CPT | Performed by: PHYSICIAN ASSISTANT

## 2022-07-07 PROCEDURE — C9113 INJ PANTOPRAZOLE SODIUM, VIA: HCPCS | Performed by: PHYSICIAN ASSISTANT

## 2022-07-07 PROCEDURE — 84100 ASSAY OF PHOSPHORUS: CPT | Performed by: PHYSICIAN ASSISTANT

## 2022-07-07 PROCEDURE — 99225 PR SBSQ OBSERVATION CARE/DAY 25 MINUTES: CPT | Performed by: SPECIALIST

## 2022-07-07 RX ADMIN — HYDROMORPHONE HYDROCHLORIDE 0.5 MG: 1 INJECTION, SOLUTION INTRAMUSCULAR; INTRAVENOUS; SUBCUTANEOUS at 03:00

## 2022-07-07 RX ADMIN — HYDROMORPHONE HYDROCHLORIDE 0.5 MG: 1 INJECTION, SOLUTION INTRAMUSCULAR; INTRAVENOUS; SUBCUTANEOUS at 13:45

## 2022-07-07 RX ADMIN — DEXTROSE, SODIUM CHLORIDE, AND POTASSIUM CHLORIDE 100 ML/HR: 5; .45; .15 INJECTION INTRAVENOUS at 09:49

## 2022-07-07 RX ADMIN — HYDROMORPHONE HYDROCHLORIDE 0.5 MG: 1 INJECTION, SOLUTION INTRAMUSCULAR; INTRAVENOUS; SUBCUTANEOUS at 08:57

## 2022-07-07 RX ADMIN — PANTOPRAZOLE SODIUM 40 MG: 40 INJECTION, POWDER, FOR SOLUTION INTRAVENOUS at 08:58

## 2022-07-07 RX ADMIN — HYDROMORPHONE HYDROCHLORIDE 0.5 MG: 1 INJECTION, SOLUTION INTRAMUSCULAR; INTRAVENOUS; SUBCUTANEOUS at 05:50

## 2022-07-07 RX ADMIN — HYDROMORPHONE HYDROCHLORIDE 0.5 MG: 1 INJECTION, SOLUTION INTRAMUSCULAR; INTRAVENOUS; SUBCUTANEOUS at 19:32

## 2022-07-07 RX ADMIN — SUCRALFATE 1 G: 1 TABLET ORAL at 16:23

## 2022-07-07 RX ADMIN — PANTOPRAZOLE SODIUM 40 MG: 40 INJECTION, POWDER, FOR SOLUTION INTRAVENOUS at 21:17

## 2022-07-07 RX ADMIN — HYDROMORPHONE HYDROCHLORIDE 0.5 MG: 1 INJECTION, SOLUTION INTRAMUSCULAR; INTRAVENOUS; SUBCUTANEOUS at 00:20

## 2022-07-07 RX ADMIN — DULOXETINE HYDROCHLORIDE 60 MG: 60 CAPSULE, DELAYED RELEASE ORAL at 08:58

## 2022-07-07 RX ADMIN — DEXTROSE, SODIUM CHLORIDE, AND POTASSIUM CHLORIDE 100 ML/HR: 5; .45; .15 INJECTION INTRAVENOUS at 22:33

## 2022-07-07 RX ADMIN — ONDANSETRON 4 MG: 2 INJECTION INTRAMUSCULAR; INTRAVENOUS at 13:45

## 2022-07-07 RX ADMIN — ONDANSETRON 4 MG: 2 INJECTION INTRAMUSCULAR; INTRAVENOUS at 19:32

## 2022-07-07 RX ADMIN — HYDROMORPHONE HYDROCHLORIDE 0.5 MG: 1 INJECTION, SOLUTION INTRAMUSCULAR; INTRAVENOUS; SUBCUTANEOUS at 16:23

## 2022-07-07 RX ADMIN — SUCRALFATE 1 G: 1 TABLET ORAL at 06:00

## 2022-07-07 RX ADMIN — ONDANSETRON 4 MG: 2 INJECTION INTRAMUSCULAR; INTRAVENOUS at 05:50

## 2022-07-07 RX ADMIN — HYDROMORPHONE HYDROCHLORIDE 0.5 MG: 1 INJECTION, SOLUTION INTRAMUSCULAR; INTRAVENOUS; SUBCUTANEOUS at 21:22

## 2022-07-07 RX ADMIN — METHYLPREDNISOLONE 16 MG: 4 TABLET ORAL at 08:57

## 2022-07-07 NOTE — PLAN OF CARE
Problem: PAIN - ADULT  Goal: Verbalizes/displays adequate comfort level or baseline comfort level  Description: Interventions:  - Encourage patient to monitor pain and request assistance  - Assess pain using appropriate pain scale  - Administer analgesics based on type and severity of pain and evaluate response  - Implement non-pharmacological measures as appropriate and evaluate response  - Consider cultural and social influences on pain and pain management  - Notify physician/advanced practitioner if interventions unsuccessful or patient reports new pain  Outcome: Progressing     Problem: INFECTION - ADULT  Goal: Absence or prevention of progression during hospitalization  Description: INTERVENTIONS:  - Assess and monitor for signs and symptoms of infection  - Monitor lab/diagnostic results  - Monitor all insertion sites, i e  indwelling lines, tubes, and drains  - Monitor endotracheal if appropriate and nasal secretions for changes in amount and color  - [place] appropriate cooling/warming therapies per order  - Administer medications as ordered  - Instruct and encourage patient and family to use good hand hygiene technique  - Identify and instruct in appropriate isolation precautions for identified infection/condition  Outcome: Progressing  Goal: Absence of fever/infection during neutropenic period  Description: INTERVENTIONS:  - Monitor WBC    Outcome: Progressing     Problem: SAFETY ADULT  Goal: Patient will remain free of falls  Description: INTERVENTIONS:  - Educate patient/family on patient safety including physical limitations  - Instruct patient to call for assistance with activity   - Consult OT/PT to assist with strengthening/mobility   - Keep Call bell within reach  - Keep bed low and locked with side rails adjusted as appropriate  - Keep care items and personal belongings within reach  - Initiate and maintain comfort rounds  - Make Fall Risk Sign visible to staff  - Apply yellow socks and bracelet for high fall risk patients  - Consider moving patient to room near nurses station  Outcome: Progressing  Goal: Maintain or return to baseline ADL function  Description: INTERVENTIONS:  -  Assess patient's ability to carry out ADLs; assess patient's baseline for ADL function and identify physical deficits which impact ability to perform ADLs (bathing, care of mouth/teeth, toileting, grooming, dressing, etc )  - Assess/evaluate cause of self-care deficits   - Assess range of motion  - Assess patient's mobility; develop plan if impaired  - Assess patient's need for assistive devices and provide as appropriate  - Encourage maximum independence but intervene and supervise when necessary  - Involve family in performance of ADLs  - Assess for home care needs following discharge   - Consider OT consult to assist with ADL evaluation and planning for discharge  - Provide patient education as appropriate  Outcome: Progressing  Goal: Maintains/Returns to pre admission functional level  Description: INTERVENTIONS:  - Perform BMAT or MOVE assessment daily    - Set and communicate daily mobility goal to care team and patient/family/caregiver     - Collaborate with rehabilitation services on mobility goals if consulted  - Out of bed for toileting  - Record patient progress and toleration of activity level   Outcome: Progressing     Problem: DISCHARGE PLANNING  Goal: Discharge to home or other facility with appropriate resources  Description: INTERVENTIONS:  - Identify barriers to discharge w/patient and caregiver  - Arrange for needed discharge resources and transportation as appropriate  - Identify discharge learning needs (meds, wound care, etc )  - Arrange for interpretive services to assist at discharge as needed  - Refer to Case Management Department for coordinating discharge planning if the patient needs post-hospital services based on physician/advanced practitioner order or complex needs related to functional status, cognitive ability, or social support system  Outcome: Progressing     Problem: Knowledge Deficit  Goal: Patient/family/caregiver demonstrates understanding of disease process, treatment plan, medications, and discharge instructions  Description: Complete learning assessment and assess knowledge base    Interventions:  - Provide teaching at level of understanding  - Provide teaching via preferred learning methods  Outcome: Progressing

## 2022-07-07 NOTE — UTILIZATION REVIEW
Initial Clinical Review    Admission: Date/Time/Statement:   Admission Orders (From admission, onward)     Ordered        07/06/22 1136  Place in Observation  Once                      Orders Placed This Encounter   Procedures    Place in Observation     Standing Status:   Standing     Number of Occurrences:   1     Order Specific Question:   Level of Care     Answer:   Med Surg [16]     ED Arrival Information     Expected   -    Arrival   7/6/2022 03:08    Acuity   Urgent            Means of arrival   Walk-In    Escorted by   Self    Service   Surgery-General    Admission type   Urgent            Arrival complaint   abd pain           Chief Complaint   Patient presents with    Abdominal Pain     Pt  States Pain in abdomen started a few hours ago  Pt  Has a current crones flare up and started   prednisone  2 days ago       Initial Presentation: 32 y o  male presents to ed from home for evaluation and treatment of abdominal pain  Patient has take po prednisone for 2 days  PMHX: CHRONE'S clinical assessment significant for 10/10 pain  WBC 13 69  Imaging shows dilated small bowel loops with air-fluid levels concerning for small bowel obstruction  Surgical consult advised to evaluate transition point at right upper quadrant surgical anastomosis  Initially treated with iv  9% ns bolus, iv mso4 x1, iv dilaudid x 6, iv zofran x1  Admit to observation for partial small bowel obstruction  Date: 7-7-22  Day 2: observation  General surgery evaluation completed  Patient with history of ulcerative colitis who had a total proctocolectomy and J pouch creation done at [facility]  Patient has a anastomotic stricture  He may need to transfer to [facility] if this does not resolve  Plan to continue tapering po methylprednisolone, iv protonix and iv d5% and  45% ns with kcl 100/hr  NPO hold off on NGT for now  Pain is 8-9/10 - received iv dilaudid at 0020, 0300 and 0550       Addendum:  Advance diet to clear liquids, continue medrol dose pack  Decrease iv fluids  Possible discharge today  ED Triage Vitals [07/06/22 0314]   98 3 °F (36 8 °C) 57 20 (!) 148/111 98 %      Oral Monitor         10 - Worst Possible Pain          07/06/22 81 6 kg (180 lb)     Additional Vital Signs:     Date/Time Temp Pulse Resp BP MAP (mmHg) SpO2 O2 Device   07/06/22 2259 98 3 °F (36 8 °C) 74 16 113/67 -- 96 % None (Room air)   07/06/22 1919 98 °F (36 7 °C) 79 17 118/66 85 -- --   07/06/22 1239 97 9 °F (36 6 °C) 89 18 109/55 -- 96 % None (Room air)   07/06/22 1115 -- 94 -- 122/66 86 97 % --   07/06/22 0800 98 4 °F (36 9 °C) 90 18 124/74 95 95 % None (Room air)   07/06/22 0530 -- 74 18 133/74 -- 99 % None (Room air)   07/06/22 0314 98 3 °F (36 8 °C) 57 20 148/111   Abnormal  -- 98 % None (Room air)                 Pertinent Labs/Diagnostic Test Results:     CT abdomen pelvis wo contrast   Final (07/06 0449)      Abnormally dilated small bowel loops in the upper hemiabdomen measuring up to 7 cm and with air-fluid levels concerning for small bowel obstruction  ?  Transition point at the right upper quadrant surgical anastomosis  No free air  Surgical consult advised             Results from last 7 days   Lab Units 07/06/22  0533   SARS-COV-2  Negative     Results from last 7 days   Lab Units 07/07/22  0556 07/06/22  0328   WBC Thousand/uL 11 07* 13 69*   HEMOGLOBIN g/dL 11 6* 12 1   HEMATOCRIT % 38 5 40 8   PLATELETS Thousands/uL 413* 447*   BANDS PCT %  --  3         Results from last 7 days   Lab Units 07/07/22  0556 07/06/22  0328   SODIUM mmol/L 136 138   POTASSIUM mmol/L 3 9 3 8   CHLORIDE mmol/L 99* 101   CO2 mmol/L 26 24   ANION GAP mmol/L 11 13   BUN mg/dL 13 18   CREATININE mg/dL 1 02 1 13   EGFR ml/min/1 73sq m 98 87   CALCIUM mg/dL 9 0 10 1   MAGNESIUM mg/dL 2 1  --    PHOSPHORUS mg/dL 4 1  --      Results from last 7 days   Lab Units 07/06/22  0328   AST U/L 20   ALT U/L 43   ALK PHOS U/L 103   TOTAL PROTEIN g/dL 7 9   ALBUMIN g/dL 4 1   TOTAL BILIRUBIN mg/dL 0 62         Results from last 7 days   Lab Units 07/07/22  0556 07/06/22  0328   GLUCOSE RANDOM mg/dL 113 112     Results from last 7 days   Lab Units 07/06/22  0328   PROTIME seconds 13 0   INR  1 00   PTT seconds 26       Results from last 7 days   Lab Units 07/06/22  0527   LACTIC ACID mmol/L 1 5       Results from last 7 days   Lab Units 07/06/22  0328   LIPASE u/L 85       ED Treatment:   Medication Administration from 07/06/2022 0308 to 07/06/2022 1259       Date/Time Order Dose Route Action     07/06/2022 0333 sodium chloride 0 9 % bolus 1,000 mL 1,000 mL Intravenous New Bag     07/06/2022 0325 morphine injection 6 mg 6 mg Intravenous Given     07/06/2022 0325 ondansetron (ZOFRAN) injection 4 mg 4 mg Intravenous Given     07/06/2022 0334 HYDROmorphone (DILAUDID) injection 1 mg 1 mg Intravenous Given     07/06/2022 0426 fentanyl citrate (PF) 100 MCG/2ML 50 mcg 50 mcg Intravenous Given     07/06/2022 0525 HYDROmorphone (DILAUDID) injection 1 mg 1 mg Intravenous Given     07/06/2022 0756 HYDROmorphone (DILAUDID) injection 1 mg 1 mg Intravenous Given     07/06/2022 0629 HYDROmorphone (DILAUDID) injection 1 mg 1 mg Intravenous Given     07/06/2022 0945 HYDROmorphone (DILAUDID) injection 1 mg 1 mg Intravenous Given     07/06/2022 0913 sodium chloride 0 9 % bolus 1,000 mL 1,000 mL Intravenous New Bag        Past Medical History:   Diagnosis Date    Ankylosing spondylitis ([facility] )     Anxiety     Bowel obstruction (HCC)     Clostridium difficile colitis 9/13/2018    Colitis     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 ) 9/13/2018    Pancreatitis     Ulcerative colitis ([facility] )      Present on Admission:  none      Admitting Diagnosis:     Small bowel obstruction ([facility] ) [K56 609]  Abdominal pain [R10 9]  Intractable abdominal pain [R10 9]    Age/Sex: 32 y o  male    Scheduled Medications:    DULoxetine, 60 mg, Oral, Daily  methylPREDNISolone, 16 mg, Oral, Daily   Followed by  [START ON 7/8/2022] methylPREDNISolone, 12 mg, Oral, Daily   Followed by  Cleopatra Hall ON 7/9/2022] methylPREDNISolone, 8 mg, Oral, Daily   Followed by  Cleopatra Hall ON 7/10/2022] methylPREDNISolone, 4 mg, Oral, Daily   Followed by  Cleopatra Hall ON 7/11/2022] methylPREDNISolone, 2 mg, Oral, Daily  pantoprazole, 40 mg, Intravenous, Q12H [facility]  sucralfate, 1 g, Oral, BID AC      Continuous IV Infusions:  dextrose 5 % and sodium chloride 0 45 % with KCl 20 mEq/L, 100 mL/hr, Intravenous, Continuous      PRN Meds:  HYDROmorphone, 0 5 mg, Intravenous, Q2H PRN  hydrOXYzine HCL, 25 mg, Oral, Q6H PRN  ondansetron, 4 mg, Intravenous, Q6H PRN        None    Network Utilization Review Department  ATTENTION: Please call with any questions or concerns to [phone] and carefully listen to the prompts so that you are directed to the right person  All voicemails are confidential   Annie Cheung all requests for admission clinical reviews, approved or denied determinations and any other requests to dedicated fax number below belonging to the campus where the patient is receiving treatment   List of dedicated fax numbers for the Facilities:  [address] DENIALS (Administrative/Medical Necessity) [phone]   [address] (Maternity/NICU/Pediatrics) [phone]   [address]  [address] [place] [address] [address] [place] [address] [address] [facility] [facility] [phone]

## 2022-07-07 NOTE — PROGRESS NOTES
Progress Note - General Surgery   Dearl Melania 32 y o  male MRN: [id number]  Unit/Bed#: [address] [place] [address] Encounter: [id number]    Assessment:  1) suspected small-bowel obstruction - improving, continuing to pass gas and have bowel movements, reduction of abdominal pain, minimal to no distention, no nausea or vomiting, feeling improved  2) heartburn/dyspepsia -  improving, patient reports sensation of heartburn in the epigastric region with almost a burning in the chest a little bit that waxes and wanes, patient does not experience typically GERD   3) history of ulcerative colitis with total colectomy and J-pouch - stable, has ileal pouch and end-to-end anastomosis, and end anastomosis has been prone to experiencing stricture and needing endoscopy with balloon angioplasty in the past, has had recent endoscopy and evaluation an end anastomosis consent last 2 months, no acute exacerbations of ulcerative colitis  4) ankylosing spondylitis - stable, was experiencing recent acute exacerbation with lower back pain, on steroid taper    Plan:  1-3)   - advance to clear liquids  - continue Medrol Dosepak   -  decrease IV fluids to 75 mL/hour D5 half-normal saline with 20 mEq potassium  -  continue Atarax p r n  for anxiety  -  continue Cymbalta  -  discontinue p r n  analgesia  - p r n  Zofran  -  reviewed lab  - I/Os  -  possible discharge today 7/7/22    Subjective/Objective   Chief Complaint:  I am feeling as if I am getting better    Subjective:  Patient was seen examined at bedside  Patient denies any acute events overnight  Patient at 1st was having some significant discomfort yesterday but over time and had pain relatively well controlled overnight and really felt that he was turning the corner  Patient denies any nausea vomiting  Patient has suggested and statements before is passing gas and having bowel movements  Patient feels less distended  Patient is not having as much of a sensation of dyspepsia    Patient is eager to try diet and possibly be discharged if he can tolerate  Objective:     Blood pressure 109/65, pulse 80, temperature 97 7 °F (36 5 °C), temperature source Oral, resp  rate 18, height 5' 9" (1 753 m), weight 81 6 kg (180 lb), SpO2 95 %  ,Body mass index is 26 58 kg/m²  Intake/Output Summary (Last 24 hours) at 7/7/2022 1312  Last data filed at 7/7/2022 0949  Gross per 24 hour   Intake 0 ml   Output 400 ml   Net -400 ml       Invasive Devices  Report    Peripheral Intravenous Line  Duration           Peripheral IV 07/06/22 Left Antecubital 1 day                Physical Exam: /65 (BP Location: Right arm)   Pulse 80   Temp 97 7 °F (36 5 °C) (Oral)   Resp 18   Ht 5' 9" (1 753 m)   Wt 81 6 kg (180 lb)   SpO2 95%   BMI 26 58 kg/m²   General appearance: alert and oriented, in no acute distress  Head: Normocephalic, without obvious abnormality, atraumatic  Lungs: clear to auscultation bilaterally  Heart: regular rate and rhythm, S1, S2 normal, no murmur, click, rub or gallop  Abdomen: soft, non-tender; bowel sounds normal; no masses,  no organomegaly  Skin: Skin color, texture, turgor normal  No rashes or lesions    Lab, Imaging and other studies:  I have personally reviewed pertinent lab results    , CBC:   Lab Results   Component Value Date    WBC 11 07 (H) 07/07/2022    HGB 11 6 (L) 07/07/2022    HCT 38 5 07/07/2022    MCV 63 (L) 07/07/2022     (H) 07/07/2022    MCH 19 0 (L) 07/07/2022    MCHC 30 1 (L) 07/07/2022    RDW 18 4 (H) 07/07/2022    MPV 8 6 (L) 07/07/2022   , CMP:   Lab Results   Component Value Date    SODIUM 136 07/07/2022    K 3 9 07/07/2022    CL 99 (L) 07/07/2022    CO2 26 07/07/2022    BUN 13 07/07/2022    CREATININE 1 02 07/07/2022    CALCIUM 9 0 07/07/2022    EGFR 98 07/07/2022     VTE Pharmacologic Prophylaxis:  Low risk, no anticoagulation  VTE Mechanical Prophylaxis: sequential compression device

## 2022-07-08 VITALS
RESPIRATION RATE: 19 BRPM | TEMPERATURE: 97.9 F | HEIGHT: 69 IN | BODY MASS INDEX: 26.66 KG/M2 | OXYGEN SATURATION: 97 % | HEART RATE: 74 BPM | DIASTOLIC BLOOD PRESSURE: 68 MMHG | SYSTOLIC BLOOD PRESSURE: 115 MMHG | WEIGHT: 180 LBS

## 2022-07-08 PROCEDURE — 99217 PR OBSERVATION CARE DISCHARGE MANAGEMENT: CPT | Performed by: SPECIALIST

## 2022-07-08 PROCEDURE — NC001 PR NO CHARGE: Performed by: SPECIALIST

## 2022-07-08 PROCEDURE — C9113 INJ PANTOPRAZOLE SODIUM, VIA: HCPCS | Performed by: PHYSICIAN ASSISTANT

## 2022-07-08 RX ORDER — METHYLPREDNISOLONE 4 MG/1
TABLET ORAL
Qty: 21 TABLET | Refills: 0 | Status: SHIPPED | OUTPATIENT
Start: 2022-07-08 | End: 2022-07-14

## 2022-07-08 RX ORDER — PANTOPRAZOLE SODIUM 20 MG/1
20 TABLET, DELAYED RELEASE ORAL DAILY
Qty: 28 TABLET | Refills: 0 | Status: ON HOLD | OUTPATIENT
Start: 2022-07-08 | End: 2022-09-20 | Stop reason: ALTCHOICE

## 2022-07-08 RX ADMIN — HYDROMORPHONE HYDROCHLORIDE 0.5 MG: 1 INJECTION, SOLUTION INTRAMUSCULAR; INTRAVENOUS; SUBCUTANEOUS at 02:23

## 2022-07-08 RX ADMIN — HYDROMORPHONE HYDROCHLORIDE 0.5 MG: 1 INJECTION, SOLUTION INTRAMUSCULAR; INTRAVENOUS; SUBCUTANEOUS at 07:25

## 2022-07-08 RX ADMIN — PANTOPRAZOLE SODIUM 40 MG: 40 INJECTION, POWDER, FOR SOLUTION INTRAVENOUS at 09:04

## 2022-07-08 RX ADMIN — HYDROMORPHONE HYDROCHLORIDE 0.5 MG: 1 INJECTION, SOLUTION INTRAMUSCULAR; INTRAVENOUS; SUBCUTANEOUS at 10:11

## 2022-07-08 RX ADMIN — METHYLPREDNISOLONE 12 MG: 4 TABLET ORAL at 09:04

## 2022-07-08 RX ADMIN — DEXTROSE, SODIUM CHLORIDE, AND POTASSIUM CHLORIDE 100 ML/HR: 5; .45; .15 INJECTION INTRAVENOUS at 10:15

## 2022-07-08 RX ADMIN — HYDROMORPHONE HYDROCHLORIDE 0.5 MG: 1 INJECTION, SOLUTION INTRAMUSCULAR; INTRAVENOUS; SUBCUTANEOUS at 04:34

## 2022-07-08 RX ADMIN — HYDROMORPHONE HYDROCHLORIDE 0.5 MG: 1 INJECTION, SOLUTION INTRAMUSCULAR; INTRAVENOUS; SUBCUTANEOUS at 12:34

## 2022-07-08 RX ADMIN — ONDANSETRON 4 MG: 2 INJECTION INTRAMUSCULAR; INTRAVENOUS at 04:34

## 2022-07-08 RX ADMIN — DULOXETINE HYDROCHLORIDE 60 MG: 60 CAPSULE, DELAYED RELEASE ORAL at 09:04

## 2022-07-08 RX ADMIN — HYDROMORPHONE HYDROCHLORIDE 0.5 MG: 1 INJECTION, SOLUTION INTRAMUSCULAR; INTRAVENOUS; SUBCUTANEOUS at 00:20

## 2022-07-08 RX ADMIN — SUCRALFATE 1 G: 1 TABLET ORAL at 06:25

## 2022-07-08 NOTE — NURSING NOTE
Discharge instructions given to patient  No questions at this time  Patient left stable with a steady gait   All belongings at side

## 2022-07-08 NOTE — UTILIZATION REVIEW
Continued Stay Review   Admission: Date/Time/Statement:   7/6/22 1136 observation     Date: 7/8/22                        Current Patient Class: inpatient   Current Level of Care: med surg    HPI:27 y o  male initially admitted on 7/6/22 to observation due to suspected small bowel obstruction  PMH of ulcerative colitis with total colectomy and J pouch  Assessment/Plan:   7/8/22:  Feels like obstruction is clearing   + large BM and passing gas  Tolerates clears  On exam:  Abdomen soft  Plan is advance diet to surgical soft, medrol dose toyin  Dc IVF  Zofran as needed  Vital Signs:   07/08/22 0757 97 9 °F (36 6 °C) 74 19 115/68 85 97 % None (Room air) Lying   07/07/22 2253 97 9 °F (36 6 °C) 78 18 119/69 80 96 % None (Room air) Lying   07/07/22 1928 97 8 °F (36 6 °C) 77 17 113/68 86 97 % None (Room air) Lying   07/07/22 1511 97 9 °F (36 6 °C) 69 18 123/65 87 96 % None (Room air) Lying   07/07/22 0745 97 7 °F (36 5 °C) 80 18 109/65 -- 95 % None (Room air) Lying   07/06/22 2259 98 3 °F (36 8 °C) 74 16 113/67 -- 96 % None (Room air) Lying   07/06/22 1919 98 °F (36 7 °C) 79 17 118/66 85 -- -- Lying   07/06/22 1239 97 9 °F (36 6 °C) 89 18 109/55 -- 96 % None (Room air) Lying   07/06/22 1115 -- 94 -- 122/66 86 97 %         Pertinent Labs/Diagnostic Results:   CT abdomen pelvis wo contrast   Final Result by Thalia Mayer MD (07/06 0449)      Abnormally dilated small bowel loops in the upper hemiabdomen measuring up to 7 cm and with air-fluid levels concerning for small bowel obstruction  ?  Transition point at the right upper quadrant surgical anastomosis  No free air  Surgical consult advised  Above findings discussed with Dr Smooth Dye at 4:45 AM on 7/6/2022        Workstation performed: [id number]           Results from last 7 days   Lab Units 07/06/22  0533   SARS-COV-2  Negative     Results from last 7 days   Lab Units 07/07/22  0556 07/06/22  0328   WBC Thousand/uL 11 07* 13 69*   HEMOGLOBIN g/dL 11 6* 12 1   HEMATOCRIT % 38 5 40 8   PLATELETS Thousands/uL 413* 447*   BANDS PCT %  --  3     Results from last 7 days   Lab Units 07/07/22  0556 07/06/22  0328   SODIUM mmol/L 136 138   POTASSIUM mmol/L 3 9 3 8   CHLORIDE mmol/L 99* 101   CO2 mmol/L 26 24   ANION GAP mmol/L 11 13   BUN mg/dL 13 18   CREATININE mg/dL 1 02 1 13   EGFR ml/min/1 73sq m 98 87   CALCIUM mg/dL 9 0 10 1   MAGNESIUM mg/dL 2 1  --    PHOSPHORUS mg/dL 4 1  --      Results from last 7 days   Lab Units 07/06/22  0328   AST U/L 20   ALT U/L 43   ALK PHOS U/L 103   TOTAL PROTEIN g/dL 7 9   ALBUMIN g/dL 4 1   TOTAL BILIRUBIN mg/dL 0 62     Results from last 7 days   Lab Units 07/07/22  0556 07/06/22  0328   GLUCOSE RANDOM mg/dL 113 112     Results from last 7 days   Lab Units 07/06/22  0328   PROTIME seconds 13 0   INR  1 00   PTT seconds 26     Results from last 7 days   Lab Units 07/06/22  0527   LACTIC ACID mmol/L 1 5     Results from last 7 days   Lab Units 07/06/22  0328   LIPASE u/L 85       Medications:   Scheduled Medications:  DULoxetine, 60 mg, Oral, Daily  [START ON 7/9/2022] methylPREDNISolone, 8 mg, Oral, Daily   Followed by  Heike Harris ON 7/10/2022] methylPREDNISolone, 4 mg, Oral, Daily   Followed by  Heike Harris ON 7/11/2022] methylPREDNISolone, 2 mg, Oral, Daily  pantoprazole, 40 mg, Intravenous, Q12H [facility]  sucralfate, 1 g, Oral, BID AC      Continuous IV Infusions:  dextrose 5 % and sodium chloride 0 45 % with KCl 20 mEq/L, 100 mL/hr, Intravenous, Continuous      PRN Meds:  HYDROmorphone, 0 5 mg, Intravenous, Q2H PRN - used x 3 7/6/22, x 8 7/7/22, x 4 7/8/22   hydrOXYzine HCL, 25 mg, Oral, Q6H PRN  ondansetron, 4 mg, Intravenous, Q6H PRN - used x 1 7/6/22, x 3 7/7/22, x 1 7/8/22         Discharge Plan:to be determined    Network Utilization Review Department  ATTENTION: Please call with any questions or concerns to [phone] and carefully listen to the prompts so that you are directed to the right person   All voicemails are confidential   Mallorie Huang all requests for admission clinical reviews, approved or denied determinations and any other requests to dedicated fax number below belonging to the campus where the patient is receiving treatment   List of dedicated fax numbers for the Facilities:  [address] [place] [address] DENIALS (Administrative/Medical Necessity) [phone]   1000  [facility] (Maternity/NICU/Pediatrics) [phone] [address]  [address] [place] [phone] [place] [address] [address] [facility] [facility] [phone]

## 2022-07-08 NOTE — PLAN OF CARE
Problem: PAIN - ADULT  Goal: Verbalizes/displays adequate comfort level or baseline comfort level  Description: Interventions:  - Encourage patient to monitor pain and request assistance  - Assess pain using appropriate pain scale  - Administer analgesics based on type and severity of pain and evaluate response  - Implement non-pharmacological measures as appropriate and evaluate response  - Consider cultural and social influences on pain and pain management  - Notify physician/advanced practitioner if interventions unsuccessful or patient reports new pain  Outcome: Progressing     Problem: INFECTION - ADULT  Goal: Absence or prevention of progression during hospitalization  Description: INTERVENTIONS:  - Assess and monitor for signs and symptoms of infection  - Monitor lab/diagnostic results  - Monitor all insertion sites, i e  indwelling lines, tubes, and drains  - Monitor endotracheal if appropriate and nasal secretions for changes in amount and color  - [place] appropriate cooling/warming therapies per order  - Administer medications as ordered  - Instruct and encourage patient and family to use good hand hygiene technique  - Identify and instruct in appropriate isolation precautions for identified infection/condition  Outcome: Progressing  Goal: Absence of fever/infection during neutropenic period  Description: INTERVENTIONS:  - Monitor WBC    Outcome: Progressing     Problem: SAFETY ADULT  Goal: Patient will remain free of falls  Description: INTERVENTIONS:  - Educate patient/family on patient safety including physical limitations  - Instruct patient to call for assistance with activity   - Consult OT/PT to assist with strengthening/mobility   - Keep Call bell within reach  - Keep bed low and locked with side rails adjusted as appropriate  - Keep care items and personal belongings within reach  - Initiate and maintain comfort rounds  - Make Fall Risk Sign visible to staff  - Offer Toileting every 2  Hours, in advance of need  - Initiate/Maintain bed alarm  - Obtain necessary fall risk management equipment: bed alarm, yellow socks,  - Apply yellow socks and bracelet for high fall risk patients  - Consider moving patient to room near nurses station  Outcome: Progressing  Goal: Maintain or return to baseline ADL function  Description: INTERVENTIONS:  -  Assess patient's ability to carry out ADLs; assess patient's baseline for ADL function and identify physical deficits which impact ability to perform ADLs (bathing, care of mouth/teeth, toileting, grooming, dressing, etc )  - Assess/evaluate cause of self-care deficits   - Assess range of motion  - Assess patient's mobility; develop plan if impaired  - Assess patient's need for assistive devices and provide as appropriate  - Encourage maximum independence but intervene and supervise when necessary  - Involve family in performance of ADLs  - Assess for home care needs following discharge   - Consider OT consult to assist with ADL evaluation and planning for discharge  - Provide patient education as appropriate  Outcome: Progressing  Goal: Maintains/Returns to pre admission functional level  Description: INTERVENTIONS:  - Perform BMAT or MOVE assessment daily    - Set and communicate daily mobility goal to care team and patient/family/caregiver  - Collaborate with rehabilitation services on mobility goals if consulted  - Perform Range of Motion 4 times a day  - Reposition patient every 2 hours    - Dangle patient 3 times a day  - Stand patient 3 times a day  - Ambulate patient 3 times a day  - Out of bed to chair 3 times a day   - Out of bed for meals 3 times a day  - Out of bed for toileting  - Record patient progress and toleration of activity level   Outcome: Progressing     Problem: DISCHARGE PLANNING  Goal: Discharge to home or other facility with appropriate resources  Description: INTERVENTIONS:  - Identify barriers to discharge w/patient and caregiver  - Arrange for needed discharge resources and transportation as appropriate  - Identify discharge learning needs (meds, wound care, etc )  - Arrange for interpretive services to assist at discharge as needed  - Refer to Case Management Department for coordinating discharge planning if the patient needs post-hospital services based on physician/advanced practitioner order or complex needs related to functional status, cognitive ability, or social support system  Outcome: Progressing     Problem: Knowledge Deficit  Goal: Patient/family/caregiver demonstrates understanding of disease process, treatment plan, medications, and discharge instructions  Description: Complete learning assessment and assess knowledge base    Interventions:  - Provide teaching at level of understanding  - Provide teaching via preferred learning methods  Outcome: Progressing

## 2022-07-08 NOTE — PROGRESS NOTES
Progress Note - General Surgery   Cindi Younger 32 y o  male MRN: [id number]  Unit/Bed#: [address] [place] [address] Encounter: [id number]    Assessment:  1) suspected small-bowel obstruction - improving,  continuing to resolve, passing more gas and bowel movements, no nausea or vomiting, tolerating clear liquids, no distention  2) heartburn/dyspepsia -  resolved  3) history of ulcerative colitis with total colectomy and J-pouch - stable, has ileal pouch and end-to-end anastomosis, and end anastomosis has been prone to experiencing stricture and needing endoscopy with balloon angioplasty in the past, has had recent endoscopy and evaluation an end anastomosis consent last 2 months, no acute exacerbations of ulcerative colitis  4) ankylosing spondylitis - stable, was experiencing recent acute exacerbation with lower back pain, on steroid taper       Plan:  1-4)   - advance to surgical soft diet  - continue Medrol Dosepak   -  d/c IV fluids  -  continue Atarax p r n  for anxiety  -  continue Cymbalta  -  discontinue p r n  analgesia  -  p r n  Zofran  -  reviewed lab  -  I/Os  -  plan discharge today 7/8/22    Subjective/Objective   Chief Complaint:  I feel like the last part of it has finally cleared up    Subjective:  Patient was seen examined at bedside  Patient denies any acute events overnight  Patient denies any fevers or chills  Patient denies any nausea or vomiting  Patient reports that he feels like the majority of his obstruction is finally cleared up  Patient had another large amount of bowel movements and passing gas  Patient has no abdominal pain or distention  Patient does not have a substantial increase in his appetite but has been tolerating clear liquids without any issues  Patient feels as if he is continuing to get better and improve accordingly  Objective:     Blood pressure 115/68, pulse 74, temperature 97 9 °F (36 6 °C), temperature source Oral, resp   rate 19, height 5' 9" (1 753 m), weight 81 6 kg (180 lb), SpO2 97 %  ,Body mass index is 26 58 kg/m²  Intake/Output Summary (Last 24 hours) at 7/8/2022 0829  Last data filed at 7/7/2022 2233  Gross per 24 hour   Intake 950 ml   Output 400 ml   Net 550 ml       Invasive Devices  Report    Peripheral Intravenous Line  Duration           Peripheral IV 07/06/22 Left Antecubital 2 days                Physical Exam: /68 (BP Location: Left arm)   Pulse 74   Temp 97 9 °F (36 6 °C) (Oral)   Resp 19   Ht 5' 9" (1 753 m)   Wt 81 6 kg (180 lb)   SpO2 97%   BMI 26 58 kg/m²   General appearance: alert and oriented, in no acute distress  Head: Normocephalic, without obvious abnormality, atraumatic  Lungs: clear to auscultation bilaterally  Heart: regular rate and rhythm, S1, S2 normal, no murmur, click, rub or gallop  Abdomen: Soft, nontender, no tympany to percussion, no distention  Skin: Skin color, texture, turgor normal  No rashes or lesions    Lab, Imaging and other studies:I have personally reviewed pertinent lab results    ,   VTE Pharmacologic Prophylaxis:  Discharging today  VTE Mechanical Prophylaxis: sequential compression device

## 2022-07-08 NOTE — DISCHARGE SUMMARY
Discharge Summary - Jocelyne Sofia 32 y o  male MRN: [id number]    Unit/Bed#: [address] [place] [address] Encounter: [id number]    Admission Date:   Admission Orders (From admission, onward)     Ordered        07/06/22 1136  Place in Observation  Once                        Admitting Diagnosis: Small bowel obstruction ([address] ) [Z25 577]  Abdominal pain [R10 9]  Intractable abdominal pain [R10 9]    Per myself 7/6/22  HPI: Jocelyne Sofia is a 32 y o  male with a past medical history pertinent for total colectomy in 2015, diagnosis of ulcerative colitis in 2012, C diff colitis in 2015, ileal pouch with end-to-end anastomosis, significant endoscopy with balloon dilation of and end anastomosis as needed presenting to the acute care surgery team due to onset of significant abdominal pain  Patient reports that he was just coming off shift when he started having the onset of acute abdominal pain the starts in the epigastric region and is generalized across all quadrants but does refer down into the deep lower left quadrant almost as if it feels like it is in his groin  Patient reports that he is had small-bowel obstructions in the past due to his prior surgical history and that this feels very similar  Patient reports that the pain is almost identical every time with the exception of fact that at this time he does have in his epigastric region some heartburn which is not his typical   Patient reports that the pain is waxing and waning then diffuse in nature with his most focal area down into the left lower quadrant  Patient in addition to this does have some heartburn into the chest with a sensation of acid reflux  Patient does have waxing waning nausea but at this point his seemingly improved since admission  Patient has not had any vomiting    Patient does typically have loose bowel movements due to his total colectomy but was actually having reduced amount of loose bowel movements compared to his normal   Patient thought as a result that he was likely having some degree of small-bowel obstruction  Patient also was not passing gas  Patient noticed that the pain was continuing to worsen over 3-4 hours  It was at this point that patient presents to the emergency department for evaluation  Patient after being held in the emergency department does note that he is seen some improvement any has been passing gas now  Patient has not had a return of his bowel movements yet but does have diminished component of nausea and pain in the abdomen  Patient denies any sort of recent antibiotic exposure or recurrence of C diff  Patient denies any sort of recent dietary changes or new foods  Patient denies any recent outdoor eating with  or contaminated food products that he is aware of  Patient denies any recent camping trips  Patient denies any recent travel outside the country  Patient denies any sort of history of buffet type food that could be spoiled  Patient in addition to all this has been on recent  Medrol Dosepak and taking NSAIDs for his back pain  Patient was thinking this could be a contributing factor to his acid reflux type sensation  Patient is having slight blood tinge in his bowel movements on occasion but very infrequently  Patient has had an endoscopy within the last 2 months to evaluate his end-to-end anastomosis did not find abnormalities regarding has bleeding with stools  Patient otherwise does not have any other complaints  Please refer to review of systems  Procedures Performed: No orders of the defined types were placed in this encounter  Summary of Hospital Course:  Patient presented 20 with significant for abdominal pain similar to prior SBO type presentation he is had in the past   Patient also was having some dyspepsia and feeling of acid reflux  Patient did have some nausea but no overt profuse vomiting    Patient in addition to this also was being treated with Medrol Dosepak and ibuprofen secondary to the fact that he was having exacerbation of ankylosing spondylitis  Patient noted he is since then having dyspepsia/acid reflux which is not his normal   Patient had CT which did exhibit signs of small bowel distention with appearance of transition type point by previous staple line from suspected reversal of loop jejunostomy  Bedtime patient was getting admitted patient was already starting to feel some degree of improvement with passing of gas  Because patient was not feeling overtly nauseated or vomiting NG tube was not inserted  Patient was hydrated with IV fluids and had routine labs to evaluate electrolyte status  Hospital day 1 patient was continuing to do well and passing gas and having bowel movements  Patient felt like he was turning the corner but not 100% yet  Patient had a reduction of abdominal pain but not complete resolution  Patient by the afternoon with advancement of diet to clear liquids was not feeling as if he was doing as well as he could be  Patient still was passing gas but still had some discomfort in his abdomen and did not necessarily feel comfortable with going home  Patient was responding appropriately to the p r n  analgesic regiment that was in place and routine labs exhibited within normal electrolytes  Patient has a result was kept into the next day  Hospital day 2 patient was continuing to pass gas and have routine bowel movements  Patient was feeling substantially improved  Patient was advanced to surgical soft low residual low-fiber diet  It was presumed that patient had completely resolved any SBO as patient was continuing to pass gas and have bowel movements  Patient has dramatic improvement was also encouraging  Patient from sucralfate and Protonix was improving from acid reflux  Patient was recommended to not take NSAIDs and conjunction with prednisone pack    Patient was recommended take a 2 week course of PPI therapy for any suspicion of developing peptic ulcer disease  Patient was discharged hospital day 8/27/22  Patient was instructed to follow-up with surgical team that assisted with his total colectomy and J-pouch  Significant Findings, Care, Treatment and Services Provided:  SBO, GERD, treatment with Carafate and Protonix, dietary restriction and IV fluid resuscitation, routine labs, CT scan    Complications: none    Discharge Diagnosis: SBO - resolved, ulcerative colitis - stable, ankylosing spondylitis - improved, dyspepsia - resolved    Medical Problems             Resolved Problems  Date Reviewed: 7/6/2022   None                 Condition at Discharge: good         Discharge instructions/Information to patient and family:   See after visit summary for information provided to patient and family  Provisions for Follow-Up Care:  See after visit summary for information related to follow-up care and any pertinent home health orders  PCP: Saurabh Leung DO    Disposition: Home    Planned Readmission: No      Discharge Statement   I spent 10 minutes discharging the patient  This time was spent on the day of discharge  I had direct contact with the patient on the day of discharge  Additional documentation is required if more than 30 minutes were spent on discharge  Discharge Medications:  See after visit summary for reconciled discharge medications provided to patient and family

## 2022-07-10 ENCOUNTER — HOSPITAL ENCOUNTER (EMERGENCY)
Facility: HOSPITAL | Age: 27
Discharge: HOME | End: 2022-07-10
Attending: EMERGENCY MEDICINE | Admitting: EMERGENCY MEDICINE
Payer: COMMERCIAL

## 2022-07-10 VITALS
WEIGHT: 180 LBS | DIASTOLIC BLOOD PRESSURE: 77 MMHG | RESPIRATION RATE: 16 BRPM | TEMPERATURE: 97.6 F | OXYGEN SATURATION: 96 % | SYSTOLIC BLOOD PRESSURE: 132 MMHG | BODY MASS INDEX: 26.58 KG/M2 | HEART RATE: 77 BPM

## 2022-07-10 DIAGNOSIS — R10.9 ABDOMINAL CRAMPING: Primary | ICD-10-CM

## 2022-07-10 LAB
ALBUMIN SERPL BCP-MCNC: 3.6 G/DL (ref 3.5–5)
ALP SERPL-CCNC: 85 U/L (ref 46–116)
ALT SERPL W P-5'-P-CCNC: 32 U/L (ref 12–78)
ANION GAP SERPL CALCULATED.3IONS-SCNC: 9 MMOL/L (ref 4–13)
AST SERPL W P-5'-P-CCNC: 13 U/L (ref 5–45)
BASOPHILS # BLD AUTO: 0.1 THOUSANDS/ΜL (ref 0–0.1)
BASOPHILS NFR BLD AUTO: 1 % (ref 0–1)
BILIRUB SERPL-MCNC: 0.46 MG/DL (ref 0.2–1)
BUN SERPL-MCNC: 14 MG/DL (ref 5–25)
CALCIUM SERPL-MCNC: 9.1 MG/DL (ref 8.3–10.1)
CHLORIDE SERPL-SCNC: 104 MMOL/L (ref 100–108)
CO2 SERPL-SCNC: 28 MMOL/L (ref 21–32)
CREAT SERPL-MCNC: 1.22 MG/DL (ref 0.6–1.3)
CRP SERPL QL: 4 MG/L
EOSINOPHIL # BLD AUTO: 0.11 THOUSAND/ΜL (ref 0–0.61)
EOSINOPHIL NFR BLD AUTO: 1 % (ref 0–6)
ERYTHROCYTE [DISTWIDTH] IN BLOOD BY AUTOMATED COUNT: 18.7 % (ref 11.6–15.1)
ERYTHROCYTE [SEDIMENTATION RATE] IN BLOOD: 14 MM/HOUR (ref 0–14)
GFR SERPL CREATININE-BSD FRML MDRD: 79 ML/MIN/1.73SQ M
GLUCOSE SERPL-MCNC: 97 MG/DL (ref 65–140)
HCT VFR BLD AUTO: 39.3 % (ref 36.5–49.3)
HGB BLD-MCNC: 11.8 G/DL (ref 12–17)
IMM GRANULOCYTES # BLD AUTO: 0.05 THOUSAND/UL (ref 0–0.2)
IMM GRANULOCYTES NFR BLD AUTO: 1 % (ref 0–2)
LACTATE SERPL-SCNC: 1.2 MMOL/L (ref 0.5–2)
LYMPHOCYTES # BLD AUTO: 1.71 THOUSANDS/ΜL (ref 0.6–4.47)
LYMPHOCYTES NFR BLD AUTO: 18 % (ref 14–44)
MCH RBC QN AUTO: 19 PG (ref 26.8–34.3)
MCHC RBC AUTO-ENTMCNC: 30 G/DL (ref 31.4–37.4)
MCV RBC AUTO: 63 FL (ref 82–98)
MONOCYTES # BLD AUTO: 1.02 THOUSAND/ΜL (ref 0.17–1.22)
MONOCYTES NFR BLD AUTO: 11 % (ref 4–12)
NEUTROPHILS # BLD AUTO: 6.35 THOUSANDS/ΜL (ref 1.85–7.62)
NEUTS SEG NFR BLD AUTO: 68 % (ref 43–75)
NRBC BLD AUTO-RTO: 0 /100 WBCS
PLATELET # BLD AUTO: 443 THOUSANDS/UL (ref 149–390)
PMV BLD AUTO: 8.6 FL (ref 8.9–12.7)
POTASSIUM SERPL-SCNC: 3.5 MMOL/L (ref 3.5–5.3)
PROT SERPL-MCNC: 7.2 G/DL (ref 6.4–8.2)
RBC # BLD AUTO: 6.2 MILLION/UL (ref 3.88–5.62)
SODIUM SERPL-SCNC: 141 MMOL/L (ref 136–145)
WBC # BLD AUTO: 9.34 THOUSAND/UL (ref 4.31–10.16)

## 2022-07-10 PROCEDURE — 85652 RBC SED RATE AUTOMATED: CPT | Performed by: EMERGENCY MEDICINE

## 2022-07-10 PROCEDURE — 99285 EMERGENCY DEPT VISIT HI MDM: CPT | Performed by: EMERGENCY MEDICINE

## 2022-07-10 PROCEDURE — 83605 ASSAY OF LACTIC ACID: CPT | Performed by: EMERGENCY MEDICINE

## 2022-07-10 PROCEDURE — 99284 EMERGENCY DEPT VISIT MOD MDM: CPT

## 2022-07-10 PROCEDURE — 85025 COMPLETE CBC W/AUTO DIFF WBC: CPT | Performed by: EMERGENCY MEDICINE

## 2022-07-10 PROCEDURE — 96375 TX/PRO/DX INJ NEW DRUG ADDON: CPT

## 2022-07-10 PROCEDURE — 96374 THER/PROPH/DIAG INJ IV PUSH: CPT

## 2022-07-10 PROCEDURE — 80053 COMPREHEN METABOLIC PANEL: CPT | Performed by: EMERGENCY MEDICINE

## 2022-07-10 PROCEDURE — 36415 COLL VENOUS BLD VENIPUNCTURE: CPT | Performed by: EMERGENCY MEDICINE

## 2022-07-10 PROCEDURE — 86140 C-REACTIVE PROTEIN: CPT | Performed by: EMERGENCY MEDICINE

## 2022-07-10 PROCEDURE — 96376 TX/PRO/DX INJ SAME DRUG ADON: CPT

## 2022-07-10 RX ORDER — HYDROMORPHONE HCL 110MG/55ML
2 PATIENT CONTROLLED ANALGESIA SYRINGE INTRAVENOUS ONCE
Status: COMPLETED | OUTPATIENT
Start: 2022-07-10 | End: 2022-07-10

## 2022-07-10 RX ORDER — ONDANSETRON 2 MG/ML
4 INJECTION INTRAMUSCULAR; INTRAVENOUS ONCE
Status: COMPLETED | OUTPATIENT
Start: 2022-07-10 | End: 2022-07-10

## 2022-07-10 RX ADMIN — ONDANSETRON 4 MG: 2 INJECTION INTRAMUSCULAR; INTRAVENOUS at 02:35

## 2022-07-10 RX ADMIN — ONDANSETRON 4 MG: 2 INJECTION INTRAMUSCULAR; INTRAVENOUS at 03:49

## 2022-07-10 RX ADMIN — HYDROMORPHONE HYDROCHLORIDE 2 MG: 2 INJECTION, SOLUTION INTRAMUSCULAR; INTRAVENOUS; SUBCUTANEOUS at 02:35

## 2022-07-10 RX ADMIN — HYDROMORPHONE HYDROCHLORIDE 2 MG: 2 INJECTION, SOLUTION INTRAMUSCULAR; INTRAVENOUS; SUBCUTANEOUS at 03:49

## 2022-07-10 NOTE — DISCHARGE INSTRUCTIONS
Feel free to return to continue workup, imaging, admission, pain control etc  Otherwise go to [facility]

## 2022-07-10 NOTE — ED PROVIDER NOTES
Final Diagnosis:  1  Abdominal cramping        Chief Complaint   Patient presents with    Abdominal Pain     Patient c/o severe lower abdominal pain starting 2 hours pta, last bowel movement was 16:30, patient c/o nasuea, history of sbo     HPI  Patient with history see total colectomy ileal pouch complicated by tortuous ileal pouch multiple subsequent SBO as   Presents here frequently  Follows with a surgeon in [place] for consideration for revision  Currently they are doing serial dilations of a area that is tight in the ileal pouch  Presents having had a bowel movement at 4:30 a m  Today but has lower abdominal pain like he needs to have a bowel movement  No blood earlier  Nausea with minimal vomiting  Ultimately we have patient drinking contrast but he wants to leave and go to [facility] where he's been treated before and they've told him he can get MRI to avoid radiation  He's had about 15 CT's just this year, so I think this seems reasonable  Father to drive him     - No language barrier    - History obtained from patient  - There are no limitations to the history obtained  - Previous charting underwent limited review with attention to last ED visits, labs, ekgs, and prior imaging  PMH:   has a past medical history of Ankylosing spondylitis ([address] ), Anxiety, Bowel obstruction ([address] ), Clostridium difficile colitis (9/13/2018), Colitis, 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[address] ) (9/13/2018), Pancreatitis, and Ulcerative colitis ([address] )  PSH:   has a past surgical history that includes Total colectomy; COLECTOMY TOTAL; and IR PICC placement single lumen (3/1/2022)  Social History:    Tobacco Use: Low Risk     Smoking Tobacco Use: Never Smoker    Smokeless Tobacco Use: Never Used     Alcohol Use: Not on file     Patient with no illicit use    ROS:    Pertinent positives/negatives:   Review of Systems   Gastrointestinal: Positive for abdominal pain, constipation and nausea  Musculoskeletal: Positive for back pain  CONSTITUTIONAL:  No dizziness  No weakness  No unexpected weight loss  EYES:  No pain, erythema, or discharge  No loss of vision  ENT:  No tinnitus, decreased hearing  No epistaxis/purulent drainage  No voice change, airway closing, trismus  CARDIOVASCULAR:  No chest pain  No palpitations  No new lower extremity edema  RESPIRATORY:  No purulent cough  No hemoptysis  No dyspnea  No paroxysmal nocturnal dyspnea  No stridor, audible wheezing bedside  GASTROINTESTINAL:  Normal appetite  No vomiting, diarrhea  No pain  No bloating  No melena  GENITOURINARY:  No frequency, urgency, nocturia  No hematuria or dysuria  No discharge  No sores/adenopathy  MUSCULOSKELETAL:  No arthralgias or myalgias that are new  INTEGUMENTARY:  No swelling  No unexpected contusions  No abrasions  No lymphangitis  NEUROLOGIC:  No meningismus  No numbness of the extremities  No new focal weakness  No postural instability  PSYCHIATRIC:  No SI HI AVH  HEMATOLOGICAL:  No bleeding  No petechiae  No bruising  ALLERGIES:  No urticaria  No sudden abd cramping  No stridor  PE:     Physical exam highlights:   Physical Exam       Vitals:    07/10/22 0213 07/10/22 0357   BP: 160/92 132/77   BP Location: Right arm Right arm   Pulse: 71 77   Resp: 18 16   Temp: 97 6 °F (36 4 °C)    TempSrc: Oral    SpO2: 100% 96%   Weight: 81 6 kg (180 lb)      Vitals reviewed by me  Nursing note reviewed  Chaperone present for all sensitive exam   Const: mild distress 2/2 pain  Alert  Nontoxic  Not diaphoretic  HEENT: External ears normal  No protrusion drainage swelling  Nose normal  No drainage/traumatic deformity  MMM  Mouth with baseline/symmetric movement  No trismus  Eyes: No squinting  No icterus  Tracks through the room with normal EOM  No tearing/swelling/drainage  Neck: ROM normal  No rigidity  No meningismus  Cards: Rate as per vitals  Compared to monitor sinus unless documented above  Regular  Well perfused    Pulm: able to verbalize without additional effort  Effort and excursion normal  No disress  No audible wheezing/ stridor  Normal resp rate  Abd: No distension beyond baseline  No fluctuant wave  Patient without peritoneal pain with shifting/bumping the bed  MSK: ROM normal and baseline  No deformity  Skin: No new rashes visible  Well perfused  Neuro: Nonfocal  Baseline  CN grossly intact  Moving all four with coordination  Psych: Normal behavior and affect  A:  - Nursing note reviewed  Ddx and MDM  Sbo? Check lactic, plan to CT renal for low radiation dose  Still stooling though         IBD?  inflamm markers                        No orders to display     Orders Placed This Encounter   Procedures    CBC and differential    Comprehensive metabolic panel    Lactic acid, plasma    Sedimentation rate, automated    C-reactive protein     Labs Reviewed   CBC AND DIFFERENTIAL - Abnormal       Result Value Ref Range Status    WBC 9 34  4 31 - 10 16 Thousand/uL Final    RBC 6 20 (*) 3 88 - 5 62 Million/uL Final    Hemoglobin 11 8 (*) 12 0 - 17 0 g/dL Final    Hematocrit 39 3  36 5 - 49 3 % Final    MCV 63 (*) 82 - 98 fL Final    MCH 19 0 (*) 26 8 - 34 3 pg Final    MCHC 30 0 (*) 31 4 - 37 4 g/dL Final    RDW 18 7 (*) 11 6 - 15 1 % Final    MPV 8 6 (*) 8 9 - 12 7 fL Final    Platelets 557 (*) 423 - 390 Thousands/uL Final    nRBC 0  /100 WBCs Final    Neutrophils Relative 68  43 - 75 % Final    Immat GRANS % 1  0 - 2 % Final    Lymphocytes Relative 18  14 - 44 % Final    Monocytes Relative 11  4 - 12 % Final    Eosinophils Relative 1  0 - 6 % Final    Basophils Relative 1  0 - 1 % Final    Neutrophils Absolute 6 35  1 85 - 7 62 Thousands/µL Final    Immature Grans Absolute 0 05  0 00 - 0 20 Thousand/uL Final    Lymphocytes Absolute 1 71  0 60 - 4 47 Thousands/µL Final    Monocytes Absolute 1 02  0 17 - 1 22 Thousand/µL Final    Eosinophils Absolute 0 11  0 00 - 0 61 Thousand/µL Final    Basophils Absolute 0 10  0 00 - 0 10 Thousands/µL Final   C-REACTIVE PROTEIN - Abnormal    CRP 4 0 (*) <3 0 mg/L Final   LACTIC ACID, PLASMA - Normal    LACTIC ACID 1 2  0 5 - 2 0 mmol/L Final    Narrative:     Result may be elevated if tourniquet was used during collection  SEDIMENTATION RATE - Normal    Sed Rate 14  0 - 14 mm/hour Final   COMPREHENSIVE METABOLIC PANEL    Sodium 981  136 - 145 mmol/L Final    Potassium 3 5  3 5 - 5 3 mmol/L Final    Chloride 104  100 - 108 mmol/L Final    CO2 28  21 - 32 mmol/L Final    ANION GAP 9  4 - 13 mmol/L Final    BUN 14  5 - 25 mg/dL Final    Creatinine 1 22  0 60 - 1 30 mg/dL Final    Comment: Standardized to IDMS reference method    Glucose 97  65 - 140 mg/dL Final    Comment: If the patient is fasting, the ADA then defines impaired fasting glucose as > 100 mg/dL and diabetes as > or equal to 123 mg/dL  Specimen collection should occur prior to Sulfasalazine administration due to the potential for falsely depressed results  Specimen collection should occur prior to Sulfapyridine administration due to the potential for falsely elevated results  Calcium 9 1  8 3 - 10 1 mg/dL Final    AST 13  5 - 45 U/L Final    Comment: Specimen collection should occur prior to Sulfasalazine administration due to the potential for falsely depressed results  ALT 32  12 - 78 U/L Final    Comment: Specimen collection should occur prior to Sulfasalazine administration due to the potential for falsely depressed results  Alkaline Phosphatase 85  46 - 116 U/L Final    Total Protein 7 2  6 4 - 8 2 g/dL Final    Albumin 3 6  3 5 - 5 0 g/dL Final    Total Bilirubin 0 46  0 20 - 1 00 mg/dL Final    Comment: Use of this assay is not recommended for patients undergoing treatment with eltrombopag due to the potential for falsely elevated results      eGFR 79  ml/min/1 73sq m Final    Narrative:     [place] guidelines for Chronic Kidney Disease (CKD):     Stage 1 with normal or high GFR (GFR > 90 mL/min/1 73 square meters)    Stage 2 Mild CKD (GFR = 60-89 mL/min/1 73 square meters)    Stage 3A Moderate CKD (GFR = 45-59 mL/min/1 73 square meters)    Stage 3B Moderate CKD (GFR = 30-44 mL/min/1 73 square meters)    Stage 4 Severe CKD (GFR = 15-29 mL/min/1 73 square meters)    Stage 5 End Stage CKD (GFR <15 mL/min/1 73 square meters)  Note: GFR calculation is accurate only with a steady state creatinine       Final Diagnosis:  1  Abdominal cramping        P:  - hospital tx includes   Medications   HYDROmorphone (DILAUDID) injection 2 mg (2 mg Intravenous Given 7/10/22 0235)   ondansetron (ZOFRAN) injection 4 mg (4 mg Intravenous Given 7/10/22 0235)   HYDROmorphone (DILAUDID) injection 2 mg (2 mg Intravenous Given 7/10/22 0349)   ondansetron (ZOFRAN) injection 4 mg (4 mg Intravenous Given 7/10/22 0349)         - disposition  Time reflects when diagnosis was documented in both MDM as applicable and the Disposition within this note     Time User Action Codes Description Comment    7/10/2022  4:21 AM Cony Fisher Add [R10 9] Abdominal cramping       ED Disposition     ED Disposition   Discharge    Condition   Stable    Date/Time   Sun Jul 10, 2022  4:20 AM    Comment   Kervin Gar discharge to home/self care  Follow-up Information    None         - patient will call their PCP to let them know they were in the emergency department   We discuss return precautions       - additional tx intended, if consistent with primary provider:  - patient to follow with :      Discharge Medication List as of 7/10/2022  4:22 AM      CONTINUE these medications which have NOT CHANGED    Details   ciprofloxacin (CIPRO) 500 mg tablet Starting Wed 6/8/2022, Historical Med      DULoxetine (CYMBALTA) 60 mg delayed release capsule Take 1 capsule (60 mg total) by mouth daily, Starting Fri 6/10/2022, Normal      hydrOXYzine HCL (ATARAX) 25 mg tablet 1-2 po q6hrs prn anxiety, Normal      methylPREDNISolone 4 MG tablet therapy pack Use as directed on package, Normal      metroNIDAZOLE (FLAGYL) 500 mg tablet Starting 2022, Historical Med      pantoprazole (PROTONIX) 20 mg tablet Take 1 tablet (20 mg total) by mouth daily, Starting 2022, Normal      sulfaSALAzine (AZULFIDINE) 500 mg tablet Take 2 tablets (1,000 mg total) by mouth 2 (two) times a day, Starting 2022, No Print           No discharge procedures on file  Prior to Admission Medications   Prescriptions Last Dose Informant Patient Reported? Taking? DULoxetine (CYMBALTA) 60 mg delayed release capsule   No No   Sig: Take 1 capsule (60 mg total) by mouth daily   ciprofloxacin (CIPRO) 500 mg tablet   Yes No   hydrOXYzine HCL (ATARAX) 25 mg tablet   No No   Si-2 po q6hrs prn anxiety   methylPREDNISolone 4 MG tablet therapy pack   No No   Sig: Use as directed on package   metroNIDAZOLE (FLAGYL) 500 mg tablet   Yes No   pantoprazole (PROTONIX) 20 mg tablet   No No   Sig: Take 1 tablet (20 mg total) by mouth daily   sulfaSALAzine (AZULFIDINE) 500 mg tablet   No No   Sig: Take 2 tablets (1,000 mg total) by mouth 2 (two) times a day      Facility-Administered Medications: None       Portions of the record may have been created with voice recognition software  Occasional wrong word or "sound a like" substitutions may have occurred due to the inherent limitations of voice recognition software  Read the chart carefully and recognize, using context, where substitutions have occurred      Electronically signed by:  MD aHydee Schulte MD  [id number]

## 2022-07-11 ENCOUNTER — HOSPITAL ENCOUNTER (INPATIENT)
Facility: HOSPITAL | Age: 27
LOS: 1 days | Discharge: HOME | DRG: 395 | End: 2022-07-14
Attending: EMERGENCY MEDICINE | Admitting: FAMILY MEDICINE
Payer: COMMERCIAL

## 2022-07-11 ENCOUNTER — HOSPITAL ENCOUNTER (EMERGENCY)
Facility: HOSPITAL | Age: 27
Discharge: HOME | End: 2022-07-11
Attending: EMERGENCY MEDICINE
Payer: COMMERCIAL

## 2022-07-11 VITALS
TEMPERATURE: 98.2 F | RESPIRATION RATE: 16 BRPM | HEART RATE: 81 BPM | WEIGHT: 180 LBS | BODY MASS INDEX: 26.58 KG/M2 | DIASTOLIC BLOOD PRESSURE: 60 MMHG | OXYGEN SATURATION: 94 % | SYSTOLIC BLOOD PRESSURE: 115 MMHG

## 2022-07-11 DIAGNOSIS — R10.9 ABDOMINAL PAIN: Primary | ICD-10-CM

## 2022-07-11 DIAGNOSIS — K91.850 ILEAL POUCHITIS (HCC): ICD-10-CM

## 2022-07-11 LAB
ALBUMIN SERPL BCP-MCNC: 3.8 G/DL (ref 3.5–5)
ALP SERPL-CCNC: 83 U/L (ref 46–116)
ALT SERPL W P-5'-P-CCNC: 32 U/L (ref 12–78)
ANION GAP SERPL CALCULATED.3IONS-SCNC: 7 MMOL/L (ref 4–13)
ANION GAP SERPL CALCULATED.3IONS-SCNC: 8 MMOL/L (ref 4–13)
AST SERPL W P-5'-P-CCNC: 46 U/L (ref 5–45)
BASOPHILS # BLD AUTO: 0.07 THOUSANDS/ΜL (ref 0–0.1)
BASOPHILS # BLD AUTO: 0.08 THOUSANDS/ΜL (ref 0–0.1)
BASOPHILS NFR BLD AUTO: 1 % (ref 0–1)
BASOPHILS NFR BLD AUTO: 1 % (ref 0–1)
BILIRUB SERPL-MCNC: 0.85 MG/DL (ref 0.2–1)
BUN SERPL-MCNC: 10 MG/DL (ref 5–25)
BUN SERPL-MCNC: 13 MG/DL (ref 5–25)
CALCIUM SERPL-MCNC: 8.6 MG/DL (ref 8.3–10.1)
CALCIUM SERPL-MCNC: 9.1 MG/DL (ref 8.3–10.1)
CHLORIDE SERPL-SCNC: 102 MMOL/L (ref 100–108)
CHLORIDE SERPL-SCNC: 104 MMOL/L (ref 100–108)
CO2 SERPL-SCNC: 30 MMOL/L (ref 21–32)
CO2 SERPL-SCNC: 31 MMOL/L (ref 21–32)
CREAT SERPL-MCNC: 1.15 MG/DL (ref 0.6–1.3)
CREAT SERPL-MCNC: 1.27 MG/DL (ref 0.6–1.3)
EOSINOPHIL # BLD AUTO: 0.1 THOUSAND/ΜL (ref 0–0.61)
EOSINOPHIL # BLD AUTO: 0.15 THOUSAND/ΜL (ref 0–0.61)
EOSINOPHIL NFR BLD AUTO: 1 % (ref 0–6)
EOSINOPHIL NFR BLD AUTO: 2 % (ref 0–6)
ERYTHROCYTE [DISTWIDTH] IN BLOOD BY AUTOMATED COUNT: 18.4 % (ref 11.6–15.1)
ERYTHROCYTE [DISTWIDTH] IN BLOOD BY AUTOMATED COUNT: 19 % (ref 11.6–15.1)
GFR SERPL CREATININE-BSD FRML MDRD: 75 ML/MIN/1.73SQ M
GFR SERPL CREATININE-BSD FRML MDRD: 85 ML/MIN/1.73SQ M
GLUCOSE SERPL-MCNC: 101 MG/DL (ref 65–140)
GLUCOSE SERPL-MCNC: 84 MG/DL (ref 65–140)
HCT VFR BLD AUTO: 38.9 % (ref 36.5–49.3)
HCT VFR BLD AUTO: 42.2 % (ref 36.5–49.3)
HGB BLD-MCNC: 11.4 G/DL (ref 12–17)
HGB BLD-MCNC: 12.4 G/DL (ref 12–17)
IMM GRANULOCYTES # BLD AUTO: 0.05 THOUSAND/UL (ref 0–0.2)
IMM GRANULOCYTES # BLD AUTO: 0.05 THOUSAND/UL (ref 0–0.2)
IMM GRANULOCYTES NFR BLD AUTO: 1 % (ref 0–2)
IMM GRANULOCYTES NFR BLD AUTO: 1 % (ref 0–2)
LACTATE SERPL-SCNC: 0.7 MMOL/L (ref 0.5–2)
LACTATE SERPL-SCNC: 1.7 MMOL/L (ref 0.5–2)
LYMPHOCYTES # BLD AUTO: 1.24 THOUSANDS/ΜL (ref 0.6–4.47)
LYMPHOCYTES # BLD AUTO: 1.55 THOUSANDS/ΜL (ref 0.6–4.47)
LYMPHOCYTES NFR BLD AUTO: 12 % (ref 14–44)
LYMPHOCYTES NFR BLD AUTO: 20 % (ref 14–44)
MAGNESIUM SERPL-MCNC: 2 MG/DL (ref 1.6–2.6)
MCH RBC QN AUTO: 18.7 PG (ref 26.8–34.3)
MCH RBC QN AUTO: 18.8 PG (ref 26.8–34.3)
MCHC RBC AUTO-ENTMCNC: 29.3 G/DL (ref 31.4–37.4)
MCHC RBC AUTO-ENTMCNC: 29.4 G/DL (ref 31.4–37.4)
MCV RBC AUTO: 64 FL (ref 82–98)
MCV RBC AUTO: 64 FL (ref 82–98)
MONOCYTES # BLD AUTO: 0.88 THOUSAND/ΜL (ref 0.17–1.22)
MONOCYTES # BLD AUTO: 0.99 THOUSAND/ΜL (ref 0.17–1.22)
MONOCYTES NFR BLD AUTO: 11 % (ref 4–12)
MONOCYTES NFR BLD AUTO: 9 % (ref 4–12)
NEUTROPHILS # BLD AUTO: 5.16 THOUSANDS/ΜL (ref 1.85–7.62)
NEUTROPHILS # BLD AUTO: 8.08 THOUSANDS/ΜL (ref 1.85–7.62)
NEUTS SEG NFR BLD AUTO: 65 % (ref 43–75)
NEUTS SEG NFR BLD AUTO: 76 % (ref 43–75)
NRBC BLD AUTO-RTO: 0 /100 WBCS
NRBC BLD AUTO-RTO: 0 /100 WBCS
PLATELET # BLD AUTO: 404 THOUSANDS/UL (ref 149–390)
PLATELET # BLD AUTO: 453 THOUSANDS/UL (ref 149–390)
PMV BLD AUTO: 8.5 FL (ref 8.9–12.7)
PMV BLD AUTO: 9.1 FL (ref 8.9–12.7)
POTASSIUM SERPL-SCNC: 3.2 MMOL/L (ref 3.5–5.3)
POTASSIUM SERPL-SCNC: 4.5 MMOL/L (ref 3.5–5.3)
PROT SERPL-MCNC: 7.7 G/DL (ref 6.4–8.2)
RBC # BLD AUTO: 6.11 MILLION/UL (ref 3.88–5.62)
RBC # BLD AUTO: 6.58 MILLION/UL (ref 3.88–5.62)
SODIUM SERPL-SCNC: 140 MMOL/L (ref 136–145)
SODIUM SERPL-SCNC: 142 MMOL/L (ref 136–145)
WBC # BLD AUTO: 10.54 THOUSAND/UL (ref 4.31–10.16)
WBC # BLD AUTO: 7.86 THOUSAND/UL (ref 4.31–10.16)

## 2022-07-11 PROCEDURE — 83735 ASSAY OF MAGNESIUM: CPT | Performed by: EMERGENCY MEDICINE

## 2022-07-11 PROCEDURE — 99285 EMERGENCY DEPT VISIT HI MDM: CPT

## 2022-07-11 PROCEDURE — 85025 COMPLETE CBC W/AUTO DIFF WBC: CPT | Performed by: EMERGENCY MEDICINE

## 2022-07-11 PROCEDURE — 96365 THER/PROPH/DIAG IV INF INIT: CPT

## 2022-07-11 PROCEDURE — 80053 COMPREHEN METABOLIC PANEL: CPT | Performed by: EMERGENCY MEDICINE

## 2022-07-11 PROCEDURE — 83605 ASSAY OF LACTIC ACID: CPT | Performed by: EMERGENCY MEDICINE

## 2022-07-11 PROCEDURE — 99285 EMERGENCY DEPT VISIT HI MDM: CPT | Performed by: EMERGENCY MEDICINE

## 2022-07-11 PROCEDURE — 36415 COLL VENOUS BLD VENIPUNCTURE: CPT | Performed by: EMERGENCY MEDICINE

## 2022-07-11 PROCEDURE — 96375 TX/PRO/DX INJ NEW DRUG ADDON: CPT

## 2022-07-11 PROCEDURE — 96372 THER/PROPH/DIAG INJ SC/IM: CPT

## 2022-07-11 PROCEDURE — 99284 EMERGENCY DEPT VISIT MOD MDM: CPT

## 2022-07-11 PROCEDURE — 96361 HYDRATE IV INFUSION ADD-ON: CPT

## 2022-07-11 PROCEDURE — 96376 TX/PRO/DX INJ SAME DRUG ADON: CPT

## 2022-07-11 PROCEDURE — 96374 THER/PROPH/DIAG INJ IV PUSH: CPT

## 2022-07-11 PROCEDURE — 80048 BASIC METABOLIC PNL TOTAL CA: CPT | Performed by: EMERGENCY MEDICINE

## 2022-07-11 RX ORDER — METOCLOPRAMIDE HYDROCHLORIDE 5 MG/ML
10 INJECTION INTRAMUSCULAR; INTRAVENOUS ONCE
Status: COMPLETED | OUTPATIENT
Start: 2022-07-11 | End: 2022-07-11

## 2022-07-11 RX ORDER — HYDROMORPHONE HCL/PF 1 MG/ML
1 SYRINGE (ML) INJECTION ONCE
Status: COMPLETED | OUTPATIENT
Start: 2022-07-11 | End: 2022-07-11

## 2022-07-11 RX ORDER — KETAMINE HCL IN NACL, ISO-OSM 100MG/10ML
0.3 SYRINGE (ML) INJECTION ONCE
Status: COMPLETED | OUTPATIENT
Start: 2022-07-11 | End: 2022-07-11

## 2022-07-11 RX ORDER — ACETAMINOPHEN 325 MG/1
975 TABLET ORAL ONCE
Status: COMPLETED | OUTPATIENT
Start: 2022-07-11 | End: 2022-07-11

## 2022-07-11 RX ORDER — ONDANSETRON 2 MG/ML
4 INJECTION INTRAMUSCULAR; INTRAVENOUS ONCE
Status: COMPLETED | OUTPATIENT
Start: 2022-07-11 | End: 2022-07-11

## 2022-07-11 RX ORDER — KETOROLAC TROMETHAMINE 30 MG/ML
15 INJECTION, SOLUTION INTRAMUSCULAR; INTRAVENOUS ONCE
Status: COMPLETED | OUTPATIENT
Start: 2022-07-11 | End: 2022-07-11

## 2022-07-11 RX ORDER — MORPHINE SULFATE 10 MG/ML
10 INJECTION, SOLUTION INTRAMUSCULAR; INTRAVENOUS ONCE
Status: COMPLETED | OUTPATIENT
Start: 2022-07-11 | End: 2022-07-11

## 2022-07-11 RX ORDER — MORPHINE SULFATE 30 MG/1
30 TABLET ORAL EVERY 6 HOURS PRN
Qty: 8 TABLET | Refills: 0 | Status: SHIPPED | OUTPATIENT
Start: 2022-07-11 | End: 2022-07-14

## 2022-07-11 RX ORDER — HALOPERIDOL 5 MG/ML
5 INJECTION INTRAMUSCULAR ONCE
Status: COMPLETED | OUTPATIENT
Start: 2022-07-11 | End: 2022-07-11

## 2022-07-11 RX ORDER — DICYCLOMINE HYDROCHLORIDE 10 MG/1
20 CAPSULE ORAL ONCE
Status: COMPLETED | OUTPATIENT
Start: 2022-07-11 | End: 2022-07-11

## 2022-07-11 RX ORDER — ONDANSETRON 4 MG/1
4 TABLET, ORALLY DISINTEGRATING ORAL ONCE
Status: COMPLETED | OUTPATIENT
Start: 2022-07-11 | End: 2022-07-11

## 2022-07-11 RX ORDER — DIAZEPAM 5 MG/ML
5 INJECTION, SOLUTION INTRAMUSCULAR; INTRAVENOUS ONCE
Status: COMPLETED | OUTPATIENT
Start: 2022-07-11 | End: 2022-07-11

## 2022-07-11 RX ADMIN — DICYCLOMINE HYDROCHLORIDE 20 MG: 10 CAPSULE ORAL at 01:12

## 2022-07-11 RX ADMIN — Medication 24 MG: at 01:15

## 2022-07-11 RX ADMIN — HYDROMORPHONE HYDROCHLORIDE 1 MG: 1 INJECTION, SOLUTION INTRAMUSCULAR; INTRAVENOUS; SUBCUTANEOUS at 22:11

## 2022-07-11 RX ADMIN — ACETAMINOPHEN 975 MG: 325 TABLET ORAL at 01:12

## 2022-07-11 RX ADMIN — ONDANSETRON 4 MG: 2 INJECTION INTRAMUSCULAR; INTRAVENOUS at 01:10

## 2022-07-11 RX ADMIN — HYDROMORPHONE HYDROCHLORIDE 1 MG: 1 INJECTION, SOLUTION INTRAMUSCULAR; INTRAVENOUS; SUBCUTANEOUS at 23:03

## 2022-07-11 RX ADMIN — ONDANSETRON 4 MG: 4 TABLET, ORALLY DISINTEGRATING ORAL at 21:47

## 2022-07-11 RX ADMIN — DIAZEPAM 5 MG: 10 INJECTION, SOLUTION INTRAMUSCULAR; INTRAVENOUS at 02:01

## 2022-07-11 RX ADMIN — KETOROLAC TROMETHAMINE 15 MG: 30 INJECTION, SOLUTION INTRAMUSCULAR at 01:09

## 2022-07-11 RX ADMIN — HALOPERIDOL LACTATE 5 MG: 5 INJECTION, SOLUTION INTRAMUSCULAR at 02:49

## 2022-07-11 RX ADMIN — SODIUM CHLORIDE 1000 ML: 0.9 INJECTION, SOLUTION INTRAVENOUS at 22:13

## 2022-07-11 RX ADMIN — METOCLOPRAMIDE HYDROCHLORIDE 10 MG: 5 INJECTION INTRAMUSCULAR; INTRAVENOUS at 22:13

## 2022-07-11 RX ADMIN — LIDOCAINE HYDROCHLORIDE 122 MG: 10 INJECTION, SOLUTION INFILTRATION; PERINEURAL at 02:53

## 2022-07-11 RX ADMIN — MORPHINE SULFATE 10 MG: 10 INJECTION INTRAVENOUS at 03:23

## 2022-07-11 NOTE — ED PROVIDER NOTES
Final Diagnosis:  1  Abdominal pain        Chief Complaint   Patient presents with    Abdominal Pain     Patient c/o abdominal pain, was in [address] at [address] [facility], [place], had CT, showed thickening of pouch, did have a few bowel movements while there, they are scheduling him for a dilation, pain started again tonight     HPI  Patient well known to department  [facility] s/p colectomy years ago  Frequent SBO/ileal pouch complicatin  Was here yesterday, left went to [facility] by prvate vehicle (by his report)  Reports they did CT (after he got contrast here) and noted pouch thickening  D/c  Returns with continued pain  Low  BM 3x since d/c here  Vomiting  Mod distress 2/2 pain  No urinary change  I discuss in detail decreased sensitivity to narcotics, including endogenous opioids, discuss in detail radiation poisoning  Try EVERYTHING but opioids  Worried about addiction  No relief  Discuss with medicine about admission for refractory pain  They recmmend 2-3 days oral narc and f/u with pain and Dr Colin Marquez      - No language barrier    - History obtained from patient  - There are no limitations to the history obtained  - Previous charting underwent limited review with attention to last ED visits, labs, ekgs, and prior imaging  PMH:   has a past medical history of Ankylosing spondylitis ([facility] [address] ), Anxiety, Bowel obstruction ([facility] ), Clostridium difficile colitis (9/13/2018), Colitis, 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[facility] ) (9/13/2018), Pancreatitis, and Ulcerative colitis ([facility] )  PSH:   has a past surgical history that includes Total colectomy; COLECTOMY TOTAL; and IR PICC placement single lumen (3/1/2022)  Social History:    Tobacco Use: Low Risk     Smoking Tobacco Use: Never Smoker    Smokeless Tobacco Use: Never Used     Alcohol Use: Not on file     Patient with no illicit use     ROS:    Pertinent positives/negatives:   Review of Systems   Gastrointestinal: Positive for abdominal pain and vomiting  CONSTITUTIONAL:  No dizziness  No weakness   No unexpected weight loss  EYES:  No pain, erythema, or discharge  No loss of vision  ENT:  No tinnitus, decreased hearing  No epistaxis/purulent drainage  No voice change, airway closing, trismus  CARDIOVASCULAR:  No chest pain  No palpitations  No new lower extremity edema  RESPIRATORY:  No purulent cough  No hemoptysis  No dyspnea  No paroxysmal nocturnal dyspnea  No stridor, audible wheezing bedside  GASTROINTESTINAL:  Normal appetite  No vomiting, diarrhea  No pain  No bloating  No melena  GENITOURINARY:  No frequency, urgency, nocturia  No hematuria or dysuria  No discharge  No sores/adenopathy  MUSCULOSKELETAL:  No arthralgias or myalgias that are new  INTEGUMENTARY:  No swelling  No unexpected contusions  No abrasions  No lymphangitis  NEUROLOGIC:  No meningismus  No numbness of the extremities  No new focal weakness  No postural instability  PSYCHIATRIC:  No SI HI AVH  HEMATOLOGICAL:  No bleeding  No petechiae  No bruising  ALLERGIES:  No urticaria  No sudden abd cramping  No stridor  PE:     Physical exam highlights:   Physical Exam       Vitals:    07/11/22 0048 07/11/22 0251 07/11/22 0300 07/11/22 0330   BP: (!) 138/108 138/67 113/75 115/60   BP Location: Right arm Left arm     Pulse: (!) 107 90 89 81   Resp: 18 18 18 16   Temp: 98 °F (36 7 °C) 98 2 °F (36 8 °C)     TempSrc: Oral Oral     SpO2: 99% 98% 96% 94%   Weight: 81 6 kg (180 lb)        Vitals reviewed by me  Nursing note reviewed  Chaperone present for all sensitive exam   Const: No acute distress  Alert  Nontoxic  Not diaphoretic  HEENT: External ears normal  No protrusion drainage swelling  Nose normal  No drainage/traumatic deformity  MMM  Mouth with baseline/symmetric movement  No trismus  Eyes: No squinting  No icterus  Tracks through the room with normal EOM  No tearing/swelling/drainage  Neck: ROM normal  No rigidity  No meningismus  Cards: Rate as per vitals  Compared to monitor sinus unless documented above  Regular  Well perfused  Pulm: able to verbalize without additional effort  Effort and excursion normal  No disress  No audible wheezing/ stridor  Normal resp rate  Abd: No distension beyond baseline  No fluctuant wave  Patient without peritoneal pain with shifting/bumping the bed  MSK: ROM normal and baseline  No deformity  Skin: No new rashes visible  Well perfused  Neuro: Nonfocal  Baseline  CN grossly intact  Moving all four with coordination  Psych: Normal behavior and affect  A:  - Nursing note reviewed      Ddx and MDM  SBO  IBD  Drug seeking  Decreased endogenous pain control  Puchitis                     ED Course as of 07/13/22 2126 Mon Jul 11, 2022   0136 Trying everything I can think of for pain to avoid narcotics for this gentleman     No orders to display     Orders Placed This Encounter   Procedures    Lactic acid, plasma    CBC and differential    Basic metabolic panel    Ambulatory Referral to Pain Management     Labs Reviewed   CBC AND DIFFERENTIAL - Abnormal       Result Value Ref Range Status    WBC 7 86  4 31 - 10 16 Thousand/uL Final    RBC 6 11 (*) 3 88 - 5 62 Million/uL Final    Hemoglobin 11 4 (*) 12 0 - 17 0 g/dL Final    Hematocrit 38 9  36 5 - 49 3 % Final    MCV 64 (*) 82 - 98 fL Final    MCH 18 7 (*) 26 8 - 34 3 pg Final    MCHC 29 3 (*) 31 4 - 37 4 g/dL Final    RDW 18 4 (*) 11 6 - 15 1 % Final    MPV 8 5 (*) 8 9 - 12 7 fL Final    Platelets 181 (*) 583 - 390 Thousands/uL Final    nRBC 0  /100 WBCs Final    Neutrophils Relative 65  43 - 75 % Final    Immat GRANS % 1  0 - 2 % Final    Lymphocytes Relative 20  14 - 44 % Final    Monocytes Relative 11  4 - 12 % Final    Eosinophils Relative 2  0 - 6 % Final    Basophils Relative 1  0 - 1 % Final    Neutrophils Absolute 5 16  1 85 - 7 62 Thousands/µL Final    Immature Grans Absolute 0 05  0 00 - 0 20 Thousand/uL Final    Lymphocytes Absolute 1 55  0 60 - 4 47 Thousands/µL Final    Monocytes Absolute 0 88  0 17 - 1 22 Thousand/µL Final    Eosinophils Absolute 0 15  0 00 - 0 61 Thousand/µL Final    Basophils Absolute 0 07  0 00 - 0 10 Thousands/µL Final   BASIC METABOLIC PANEL - Abnormal    Sodium 142  136 - 145 mmol/L Final    Potassium 3 2 (*) 3 5 - 5 3 mmol/L Final    Chloride 104  100 - 108 mmol/L Final    CO2 31  21 - 32 mmol/L Final    ANION GAP 7  4 - 13 mmol/L Final    BUN 10  5 - 25 mg/dL Final    Creatinine 1 15  0 60 - 1 30 mg/dL Final    Comment: Standardized to IDMS reference method    Glucose 101  65 - 140 mg/dL Final    Comment: If the patient is fasting, the ADA then defines impaired fasting glucose as > 100 mg/dL and diabetes as > or equal to 123 mg/dL  Specimen collection should occur prior to Sulfasalazine administration due to the potential for falsely depressed results  Specimen collection should occur prior to Sulfapyridine administration due to the potential for falsely elevated results  Calcium 8 6  8 3 - 10 1 mg/dL Final    eGFR 85  ml/min/1 73sq m Final    Narrative:     [place] guidelines for Chronic Kidney Disease (CKD):     Stage 1 with normal or high GFR (GFR > 90 mL/min/1 73 square meters)    Stage 2 Mild CKD (GFR = 60-89 mL/min/1 73 square meters)    Stage 3A Moderate CKD (GFR = 45-59 mL/min/1 73 square meters)    Stage 3B Moderate CKD (GFR = 30-44 mL/min/1 73 square meters)    Stage 4 Severe CKD (GFR = 15-29 mL/min/1 73 square meters)    Stage 5 End Stage CKD (GFR <15 mL/min/1 73 square meters)  Note: GFR calculation is accurate only with a steady state creatinine   LACTIC ACID, PLASMA - Normal    LACTIC ACID 1 7  0 5 - 2 0 mmol/L Final    Narrative:     Result may be elevated if tourniquet was used during collection  Final Diagnosis:  1   Abdominal pain        P:  - hospital tx includes   Medications   ketorolac (TORADOL) injection 15 mg (15 mg Intravenous Given 7/11/22 0109)   ondansetron (ZOFRAN) injection 4 mg (4 mg Intravenous Given 7/11/22 0110)   acetaminophen (TYLENOL) tablet 975 mg (975 mg Oral Given 7/11/22 0112)   dicyclomine (BENTYL) capsule 20 mg (20 mg Oral Given 7/11/22 0112)   Ketamine HCl 24 mg (24 mg Intravenous Given 7/11/22 0115)   lidocaine (XYLOCAINE) 1 % 122 mg in dextrose 5 % 50 mL IVPB (0 mg/kg × 81 6 kg Intravenous Stopped 7/11/22 0339)   diazepam (VALIUM) injection 5 mg (5 mg Intravenous Given 7/11/22 0201)   haloperidol lactate (HALDOL) injection 5 mg (5 mg Intramuscular Given 7/11/22 0249)   morphine injection 10 mg (10 mg Intravenous Given 7/11/22 0323)         - disposition  Time reflects when diagnosis was documented in both MDM as applicable and the Disposition within this note     Time User Action Codes Description Comment    7/11/2022  4:38 AM Nayla Whitley Add [R10 9] Abdominal pain       ED Disposition     ED Disposition   Discharge    Condition   Stable    Date/Time   Mon Jul 11, 2022  4:36 AM    Comment   Padilla Fine discharge to home/self care  Follow-up Information     Follow up With Specialties Details Why Contact Info    Giovanni Sumner MD Pain Medicine, Anesthesiology   [address] [place] [address]  [phone]            - patient will call their PCP to let them know they were in the emergency department   We discuss return precautions       - additional tx intended, if consistent with primary provider:  - patient to follow with :      Discharge Medication List as of 7/11/2022  4:45 AM      START taking these medications    Details   morphine (MSIR) 30 MG tablet Take 1 tablet (30 mg total) by mouth every 6 (six) hours as needed for severe pain for up to 2 days Max Daily Amount: 120 mg, Starting Mon 7/11/2022, Until Wed 7/13/2022 at 2359, Normal         CONTINUE these medications which have NOT CHANGED    Details   ciprofloxacin (CIPRO) 500 mg tablet Starting Wed 6/8/2022, Historical Med      DULoxetine (CYMBALTA) 60 mg delayed release capsule Take 1 capsule (60 mg total) by mouth daily, Starting Fri 6/10/2022, Normal      hydrOXYzine HCL (ATARAX) 25 mg tablet 1-2 po q6hrs prn anxiety, Normal      methylPREDNISolone 4 MG tablet therapy pack Use as directed on package, Normal      metroNIDAZOLE (FLAGYL) 500 mg tablet Starting 2022, Historical Med      pantoprazole (PROTONIX) 20 mg tablet Take 1 tablet (20 mg total) by mouth daily, Starting 2022, Normal      sulfaSALAzine (AZULFIDINE) 500 mg tablet Take 2 tablets (1,000 mg total) by mouth 2 (two) times a day, Starting 2022, No Print             Prior to Admission Medications   Prescriptions Last Dose Informant Patient Reported? Taking? DULoxetine (CYMBALTA) 60 mg delayed release capsule   No No   Sig: Take 1 capsule (60 mg total) by mouth daily   ciprofloxacin (CIPRO) 500 mg tablet   Yes No   Patient not taking: Reported on 2022   hydrOXYzine HCL (ATARAX) 25 mg tablet   No No   Si-2 po q6hrs prn anxiety   Patient not taking: Reported on 2022   methylPREDNISolone 4 MG tablet therapy pack   No No   Sig: Use as directed on package   Patient not taking: Reported on 2022   metroNIDAZOLE (FLAGYL) 500 mg tablet   Yes No   Patient not taking: Reported on 2022   pantoprazole (PROTONIX) 20 mg tablet   No No   Sig: Take 1 tablet (20 mg total) by mouth daily   Patient not taking: Reported on 2022   sulfaSALAzine (AZULFIDINE) 500 mg tablet   No No   Sig: Take 2 tablets (1,000 mg total) by mouth 2 (two) times a day   Patient not taking: Reported on 2022      Facility-Administered Medications: None       Portions of the record may have been created with voice recognition software  Occasional wrong word or "sound a like" substitutions may have occurred due to the inherent limitations of voice recognition software  Read the chart carefully and recognize, using context, where substitutions have occurred      Electronically signed by:  MD Lotus Glass MD  [id number]

## 2022-07-11 NOTE — ED NOTES
Patient placed on continuous cardiac monitoring  Vital signs cycling every 15 minutes        Michael Madrid, [address] [facility]  07/11/22 2013

## 2022-07-11 NOTE — ED NOTES
Patient c/o severe abdominal pain, doctor aware, and at bedside     Valerie Moulton RN  07/11/22 8891

## 2022-07-12 ENCOUNTER — APPOINTMENT (EMERGENCY)
Dept: RADIOLOGY | Facility: HOSPITAL | Age: 27
DRG: 395 | End: 2022-07-12
Payer: COMMERCIAL

## 2022-07-12 PROBLEM — R10.9 ABDOMINAL PAIN: Status: ACTIVE | Noted: 2022-07-12

## 2022-07-12 LAB
ANION GAP SERPL CALCULATED.3IONS-SCNC: 6 MMOL/L (ref 4–13)
BASOPHILS # BLD AUTO: 0.09 THOUSANDS/ΜL (ref 0–0.1)
BASOPHILS NFR BLD AUTO: 1 % (ref 0–1)
BILIRUB UR QL STRIP: NEGATIVE
BUN SERPL-MCNC: 11 MG/DL (ref 5–25)
CALCIUM SERPL-MCNC: 8.2 MG/DL (ref 8.3–10.1)
CHLORIDE SERPL-SCNC: 102 MMOL/L (ref 100–108)
CLARITY UR: NORMAL
CO2 SERPL-SCNC: 28 MMOL/L (ref 21–32)
COLOR UR: YELLOW
CREAT SERPL-MCNC: 0.96 MG/DL (ref 0.6–1.3)
EOSINOPHIL # BLD AUTO: 0.15 THOUSAND/ΜL (ref 0–0.61)
EOSINOPHIL NFR BLD AUTO: 1 % (ref 0–6)
ERYTHROCYTE [DISTWIDTH] IN BLOOD BY AUTOMATED COUNT: 17.8 % (ref 11.6–15.1)
GFR SERPL CREATININE-BSD FRML MDRD: 106 ML/MIN/1.73SQ M
GLUCOSE P FAST SERPL-MCNC: 92 MG/DL (ref 65–99)
GLUCOSE SERPL-MCNC: 92 MG/DL (ref 65–140)
GLUCOSE UR STRIP-MCNC: NEGATIVE MG/DL
HCT VFR BLD AUTO: 35.6 % (ref 36.5–49.3)
HGB BLD-MCNC: 10.6 G/DL (ref 12–17)
HGB UR QL STRIP.AUTO: NEGATIVE
IMM GRANULOCYTES # BLD AUTO: 0.06 THOUSAND/UL (ref 0–0.2)
IMM GRANULOCYTES NFR BLD AUTO: 1 % (ref 0–2)
KETONES UR STRIP-MCNC: NEGATIVE MG/DL
LEUKOCYTE ESTERASE UR QL STRIP: NEGATIVE
LYMPHOCYTES # BLD AUTO: 1.36 THOUSANDS/ΜL (ref 0.6–4.47)
LYMPHOCYTES NFR BLD AUTO: 12 % (ref 14–44)
MCH RBC QN AUTO: 19 PG (ref 26.8–34.3)
MCHC RBC AUTO-ENTMCNC: 29.8 G/DL (ref 31.4–37.4)
MCV RBC AUTO: 64 FL (ref 82–98)
MONOCYTES # BLD AUTO: 0.68 THOUSAND/ΜL (ref 0.17–1.22)
MONOCYTES NFR BLD AUTO: 6 % (ref 4–12)
NEUTROPHILS # BLD AUTO: 9.38 THOUSANDS/ΜL (ref 1.85–7.62)
NEUTS SEG NFR BLD AUTO: 79 % (ref 43–75)
NITRITE UR QL STRIP: NEGATIVE
NRBC BLD AUTO-RTO: 0 /100 WBCS
PH UR STRIP.AUTO: 7.5 [PH]
PLATELET # BLD AUTO: 340 THOUSANDS/UL (ref 149–390)
PMV BLD AUTO: 8.5 FL (ref 8.9–12.7)
POTASSIUM SERPL-SCNC: 3.6 MMOL/L (ref 3.5–5.3)
PROT UR STRIP-MCNC: NEGATIVE MG/DL
RBC # BLD AUTO: 5.59 MILLION/UL (ref 3.88–5.62)
SARS-COV-2 RNA RESP QL NAA+PROBE: NEGATIVE
SODIUM SERPL-SCNC: 136 MMOL/L (ref 136–145)
SP GR UR STRIP.AUTO: 1.01 (ref 1–1.03)
UROBILINOGEN UR QL STRIP.AUTO: 0.2 E.U./DL
WBC # BLD AUTO: 11.72 THOUSAND/UL (ref 4.31–10.16)

## 2022-07-12 PROCEDURE — 99214 OFFICE O/P EST MOD 30 MIN: CPT | Performed by: INTERNAL MEDICINE

## 2022-07-12 PROCEDURE — U0005 INFEC AGEN DETEC AMPLI PROBE: HCPCS | Performed by: EMERGENCY MEDICINE

## 2022-07-12 PROCEDURE — 80048 BASIC METABOLIC PNL TOTAL CA: CPT

## 2022-07-12 PROCEDURE — G1004 CDSM NDSC: HCPCS

## 2022-07-12 PROCEDURE — U0003 INFECTIOUS AGENT DETECTION BY NUCLEIC ACID (DNA OR RNA); SEVERE ACUTE RESPIRATORY SYNDROME CORONAVIRUS 2 (SARS-COV-2) (CORONAVIRUS DISEASE [COVID-19]), AMPLIFIED PROBE TECHNIQUE, MAKING USE OF HIGH THROUGHPUT TECHNOLOGIES AS DESCRIBED BY CMS-2020-01-R: HCPCS | Performed by: EMERGENCY MEDICINE

## 2022-07-12 PROCEDURE — 85025 COMPLETE CBC W/AUTO DIFF WBC: CPT

## 2022-07-12 PROCEDURE — 96375 TX/PRO/DX INJ NEW DRUG ADDON: CPT

## 2022-07-12 PROCEDURE — 81003 URINALYSIS AUTO W/O SCOPE: CPT | Performed by: EMERGENCY MEDICINE

## 2022-07-12 PROCEDURE — 74176 CT ABD & PELVIS W/O CONTRAST: CPT

## 2022-07-12 PROCEDURE — 99220 PR INITIAL OBSERVATION CARE/DAY 70 MINUTES: CPT | Performed by: FAMILY MEDICINE

## 2022-07-12 RX ORDER — SULFASALAZINE 500 MG/1
1000 TABLET ORAL 2 TIMES DAILY
Status: DISCONTINUED | OUTPATIENT
Start: 2022-07-12 | End: 2022-07-13

## 2022-07-12 RX ORDER — MORPHINE SULFATE 4 MG/ML
4 INJECTION, SOLUTION INTRAMUSCULAR; INTRAVENOUS EVERY 4 HOURS PRN
Status: DISCONTINUED | OUTPATIENT
Start: 2022-07-12 | End: 2022-07-12

## 2022-07-12 RX ORDER — PANTOPRAZOLE SODIUM 20 MG/1
20 TABLET, DELAYED RELEASE ORAL DAILY
Status: DISCONTINUED | OUTPATIENT
Start: 2022-07-12 | End: 2022-07-14 | Stop reason: HOSPADM

## 2022-07-12 RX ORDER — METHYLPREDNISOLONE SODIUM SUCCINATE 40 MG/ML
20 INJECTION, POWDER, LYOPHILIZED, FOR SOLUTION INTRAMUSCULAR; INTRAVENOUS EVERY 8 HOURS SCHEDULED
Status: DISCONTINUED | OUTPATIENT
Start: 2022-07-12 | End: 2022-07-14 | Stop reason: HOSPADM

## 2022-07-12 RX ORDER — SODIUM CHLORIDE, SODIUM GLUCONATE, SODIUM ACETATE, POTASSIUM CHLORIDE, MAGNESIUM CHLORIDE, SODIUM PHOSPHATE, DIBASIC, AND POTASSIUM PHOSPHATE .53; .5; .37; .037; .03; .012; .00082 G/100ML; G/100ML; G/100ML; G/100ML; G/100ML; G/100ML; G/100ML
100 INJECTION, SOLUTION INTRAVENOUS CONTINUOUS
Status: DISCONTINUED | OUTPATIENT
Start: 2022-07-12 | End: 2022-07-14 | Stop reason: HOSPADM

## 2022-07-12 RX ORDER — ONDANSETRON 2 MG/ML
4 INJECTION INTRAMUSCULAR; INTRAVENOUS ONCE
Status: COMPLETED | OUTPATIENT
Start: 2022-07-12 | End: 2022-07-12

## 2022-07-12 RX ORDER — HYDROMORPHONE HCL/PF 1 MG/ML
0.5 SYRINGE (ML) INJECTION EVERY 4 HOURS PRN
Status: DISCONTINUED | OUTPATIENT
Start: 2022-07-12 | End: 2022-07-12

## 2022-07-12 RX ORDER — FAMOTIDINE 10 MG/ML
20 INJECTION, SOLUTION INTRAVENOUS ONCE
Status: COMPLETED | OUTPATIENT
Start: 2022-07-12 | End: 2022-07-12

## 2022-07-12 RX ORDER — CIPROFLOXACIN 2 MG/ML
400 INJECTION, SOLUTION INTRAVENOUS EVERY 12 HOURS
Status: DISCONTINUED | OUTPATIENT
Start: 2022-07-12 | End: 2022-07-14 | Stop reason: HOSPADM

## 2022-07-12 RX ORDER — ACETAMINOPHEN 325 MG/1
650 TABLET ORAL EVERY 6 HOURS PRN
Status: DISCONTINUED | OUTPATIENT
Start: 2022-07-12 | End: 2022-07-14 | Stop reason: HOSPADM

## 2022-07-12 RX ORDER — METRONIDAZOLE 500 MG/100ML
500 INJECTION, SOLUTION INTRAVENOUS EVERY 8 HOURS
Status: DISCONTINUED | OUTPATIENT
Start: 2022-07-12 | End: 2022-07-14 | Stop reason: HOSPADM

## 2022-07-12 RX ORDER — ONDANSETRON 2 MG/ML
4 INJECTION INTRAMUSCULAR; INTRAVENOUS EVERY 6 HOURS PRN
Status: DISCONTINUED | OUTPATIENT
Start: 2022-07-12 | End: 2022-07-14 | Stop reason: HOSPADM

## 2022-07-12 RX ORDER — MORPHINE SULFATE 4 MG/ML
4 INJECTION, SOLUTION INTRAMUSCULAR; INTRAVENOUS
Status: DISCONTINUED | OUTPATIENT
Start: 2022-07-12 | End: 2022-07-14 | Stop reason: HOSPADM

## 2022-07-12 RX ORDER — MORPHINE SULFATE 10 MG/ML
8 INJECTION, SOLUTION INTRAMUSCULAR; INTRAVENOUS ONCE
Status: COMPLETED | OUTPATIENT
Start: 2022-07-12 | End: 2022-07-12

## 2022-07-12 RX ORDER — DULOXETIN HYDROCHLORIDE 60 MG/1
60 CAPSULE, DELAYED RELEASE ORAL DAILY
Status: DISCONTINUED | OUTPATIENT
Start: 2022-07-12 | End: 2022-07-14 | Stop reason: HOSPADM

## 2022-07-12 RX ORDER — KETOROLAC TROMETHAMINE 30 MG/ML
15 INJECTION, SOLUTION INTRAMUSCULAR; INTRAVENOUS ONCE
Status: COMPLETED | OUTPATIENT
Start: 2022-07-12 | End: 2022-07-12

## 2022-07-12 RX ADMIN — CIPROFLOXACIN 400 MG: 2 INJECTION, SOLUTION INTRAVENOUS at 12:21

## 2022-07-12 RX ADMIN — MORPHINE SULFATE 4 MG: 4 INJECTION INTRAVENOUS at 13:37

## 2022-07-12 RX ADMIN — ONDANSETRON 4 MG: 2 INJECTION INTRAMUSCULAR; INTRAVENOUS at 16:14

## 2022-07-12 RX ADMIN — HYDROMORPHONE HYDROCHLORIDE 0.5 MG: 1 INJECTION, SOLUTION INTRAMUSCULAR; INTRAVENOUS; SUBCUTANEOUS at 05:21

## 2022-07-12 RX ADMIN — METHYLPREDNISOLONE SODIUM SUCCINATE 20 MG: 40 INJECTION, POWDER, FOR SOLUTION INTRAMUSCULAR; INTRAVENOUS at 18:09

## 2022-07-12 RX ADMIN — SULFASALAZINE 1000 MG: 500 TABLET ORAL at 18:50

## 2022-07-12 RX ADMIN — DULOXETINE HYDROCHLORIDE 60 MG: 60 CAPSULE, DELAYED RELEASE ORAL at 09:31

## 2022-07-12 RX ADMIN — SULFASALAZINE 1000 MG: 500 TABLET ORAL at 10:53

## 2022-07-12 RX ADMIN — MORPHINE SULFATE 8 MG: 10 INJECTION INTRAVENOUS at 01:11

## 2022-07-12 RX ADMIN — KETOROLAC TROMETHAMINE 15 MG: 30 INJECTION, SOLUTION INTRAMUSCULAR at 03:54

## 2022-07-12 RX ADMIN — METRONIDAZOLE 500 MG: 500 INJECTION, SOLUTION INTRAVENOUS at 10:53

## 2022-07-12 RX ADMIN — ONDANSETRON 4 MG: 2 INJECTION INTRAMUSCULAR; INTRAVENOUS at 01:10

## 2022-07-12 RX ADMIN — MORPHINE SULFATE 4 MG: 4 INJECTION INTRAVENOUS at 19:34

## 2022-07-12 RX ADMIN — PANTOPRAZOLE SODIUM 20 MG: 20 TABLET, DELAYED RELEASE ORAL at 06:45

## 2022-07-12 RX ADMIN — MORPHINE SULFATE 4 MG: 4 INJECTION INTRAVENOUS at 16:14

## 2022-07-12 RX ADMIN — METHYLPREDNISOLONE SODIUM SUCCINATE 20 MG: 40 INJECTION, POWDER, FOR SOLUTION INTRAMUSCULAR; INTRAVENOUS at 10:53

## 2022-07-12 RX ADMIN — METRONIDAZOLE 500 MG: 500 INJECTION, SOLUTION INTRAVENOUS at 18:10

## 2022-07-12 RX ADMIN — FAMOTIDINE 20 MG: 10 INJECTION, SOLUTION INTRAVENOUS at 03:54

## 2022-07-12 RX ADMIN — SODIUM CHLORIDE, SODIUM GLUCONATE, SODIUM ACETATE, POTASSIUM CHLORIDE, MAGNESIUM CHLORIDE, SODIUM PHOSPHATE, DIBASIC, AND POTASSIUM PHOSPHATE 100 ML/HR: .53; .5; .37; .037; .03; .012; .00082 INJECTION, SOLUTION INTRAVENOUS at 03:54

## 2022-07-12 RX ADMIN — ONDANSETRON 4 MG: 2 INJECTION INTRAMUSCULAR; INTRAVENOUS at 21:59

## 2022-07-12 RX ADMIN — DEXAMETHASONE SODIUM PHOSPHATE 10 MG: 10 INJECTION INTRAMUSCULAR; INTRAVENOUS at 03:54

## 2022-07-12 RX ADMIN — MORPHINE SULFATE 4 MG: 4 INJECTION INTRAVENOUS at 09:31

## 2022-07-12 RX ADMIN — MORPHINE SULFATE 4 MG: 4 INJECTION INTRAVENOUS at 22:41

## 2022-07-12 RX ADMIN — CIPROFLOXACIN 400 MG: 2 INJECTION, SOLUTION INTRAVENOUS at 21:13

## 2022-07-12 NOTE — ASSESSMENT & PLAN NOTE
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

## 2022-07-12 NOTE — CONSULTS
Physician Requesting Consult: Jethro Krishnan DO    Reason for Consult / Principal Problem:  Abdomen pain      Assessment/Plan:    1  Abdomen pain  Patient provide consented with severe lower abdominal pain shave  Patient has history of pouchitis, and recurrent anastomotic strictures leading to small-bowel obstruction  Patient follows with surgeon in the [place] who is considering revision per patient  CT abdomen showed markedly distended ileal anal anastomosis with surrounding inflammatory changes  Findings may represent infection verses inflammatory cannot rule out ischemia  Positive leukocytosis, white blood count 10 54  Abdominal pain may be secondary to inflammatory bowel disease, infectious process, or ischemia   -Start Cipro 400 mg IV Q 12 hours  -Start Flagyl 500 mg IV Q 8 hours  -Start Solu-Medrol 20 mg IV Q 8 hours  -Pain management per attending  -Zofran as needed for nausea or vomiting  -Keep NPO  -Continue supportive care and IV fluid hydration   -If no improvement in symptoms patient may need further evaluation with pouchoscopy  -Avoid NSAIDs    Will follow  Thank you for the consult  Case discussed with Dr Talon Johnson  HPI: Jocelyne Sofia is a 32 y o  male  Abdominal pain   This is a 64-year-old male with a past medical history of ulcerative colitis post colectomy and ileal pouch formation, anxiety, pouchitis, anastomotic strictures, and ankylosing spondylitis who presents to [place] [address] on 7/11 with report of severe lower abdominal pain associated with nausea  Patient reports that he has been experiencing lower abdominal pain for the past 2 days  Patient was in the emergency department 2 days ago and was sent home with pain management referral and narcotics but came back due to no relief of pain  Patient does report nausea whenever he tries to eat  Patient denies vomiting, acid reflux, heartburn, dysphagia, epigastric or upper abdominal pain    Patient reports he has approximately 3 bowel movements daily which is his normal and is associated with minimal blood  Patient denies black tarry stool  CT abdomen and pelvis without contrast done 7/12 showed markedly distended ileoanal anastomosis with surrounding inflammatory changes  Findings may represent infection versus inflammatory cannot rule out ischemia  C reactive protein 4 0 on 07/10/2022  Positive leukocytosis, white blood count 10 54 on admission, white blood count 11 72 this a m  Judi [place] Patient does report he takes Aleve occasionally when he gets severe pain in his chest from his ankylosing spondylitis or when his SI pain is acting up  Reinforce to patient that he should not take NSAIDs due to his history of ulcerative colitis  Last EGD done 06/07/2022 which showed normal EGD  Antral biopsy was done H pylori  Biopsy showed no H pylori  Last pouchoscopy done 05/31/2022 which showed mild patchy erythematous mucosa, biopsies obtained  Ileoanal anastomosis with seen, small bowel had some erythema punctuate ulcers, biopsied  Ileoanal anastomosis was open but about 16-18 mm in caliber  This was dilated with 20 mm balloon for 30 seconds  Biopsy showed chronic active entercolitis with severe activity  No CMV  Patient does not smoke  Patient reports social use of alcohol  No family history gastric or colon cancer    Allergies:    Allergies   Allergen Reactions    Mesalamine GI Intolerance     Other reaction(s): Pancreatitis  Annotation - [id number]: pancreatitis    Cefazolin Hives       Medications:  Current Facility-Administered Medications:     acetaminophen (TYLENOL) tablet 650 mg, 650 mg, Oral, Q6H PRN, Emmanuelle Dodge PA-C    ciprofloxacin (CIPRO) IVPB (premix in 5% dextrose) 400 mg 200 mL, 400 mg, Intravenous, Q12H, [place] Schulz    DULoxetine (CYMBALTA) delayed release capsule 60 mg, 60 mg, Oral, Daily, Emmanuelle Dodge PA-C, 60 mg at 07/12/22 0931    HYDROmorphone (DILAUDID) injection 0 5 mg, 0 5 mg, Intravenous, Q4H PRN, Emmanuelle Vecellio, PA-C, 0 5 mg at 07/12/22 6255    methylPREDNISolone sodium succinate (Solu-MEDROL) injection 20 mg, 20 mg, Intravenous, Q8H KAITLIN, CARLITOS SchulzNP, 20 mg at 07/12/22 1053    metroNIDAZOLE (FLAGYL) IVPB (premix) 500 mg 100 mL, 500 mg, Intravenous, Q8H, CARLITOS SchulzNP, Last Rate: 200 mL/hr at 07/12/22 1053, 500 mg at 07/12/22 1053    morphine injection 4 mg, 4 mg, Intravenous, Q4H PRN, MEENU Segura-C    morphine injection 4 mg, 4 mg, Intravenous, Q4H PRN, Emmanuelle Vecellio, PA-C, 4 mg at 07/12/22 0931    multi-electrolyte (PLASMALYTE-A/ISOLYTE-S PH 7 4) IV solution, 100 mL/hr, Intravenous, Continuous, Emmanuelle Vecdamariso, PA-C, Last Rate: 100 mL/hr at 07/12/22 0354, 100 mL/hr at 07/12/22 0354    ondansetron (ZOFRAN) injection 4 mg, 4 mg, Intravenous, Q6H PRN, Garrett Ghosh PA-C    pantoprazole (PROTONIX) EC tablet 20 mg, 20 mg, Oral, Daily, Emmanuelle Vecellio, PA-C, 20 mg at 07/12/22 0645    sulfaSALAzine (AZULFIDINE) tablet 1,000 mg, 1,000 mg, Oral, BID, Emmanuelle Vecellio, PA-C, 1,000 mg at 07/12/22 1053    Past Medical history:  Past Medical History:   Diagnosis Date    [facility] [address] [facility]     Anxiety     [facility] obstruction [facility] [address] [facility]     Clostridium difficile colitis 9/13/2018    Colitis     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[facility] [address] ) 9/13/2018    Pancreatitis     [facility]        Past Surgical History:   Past Surgical History:   Procedure Laterality Date    COLECTOMY TOTAL      with ileal pouch and anastemosis    IR PICC PLACEMENT SINGLE LUMEN  3/1/2022    TOTAL COLECTOMY         Social history:   Social History     Tobacco Use    Smoking status: Never Smoker    Smokeless tobacco: Never Used   Vaping Use    Vaping Use: Never used   Substance Use Topics    Alcohol use: Never     Comment: pt states 5 a month/socially    Drug use: No       Family history: History reviewed  No pertinent family history       Review of Systems: Review of Systems   Constitutional: Positive for appetite change  Gastrointestinal: Positive for abdominal pain and nausea  All other systems reviewed and are negative  Physical Exam: Physical Exam  Vitals and nursing note reviewed  Constitutional:       General: He is not in acute distress  HENT:      Head: Normocephalic and atraumatic  Cardiovascular:      Rate and Rhythm: Normal rate and regular rhythm  Pulses: Normal pulses  Heart sounds: Normal heart sounds  Pulmonary:      Effort: Pulmonary effort is normal  No respiratory distress  Breath sounds: Normal breath sounds  No stridor  No wheezing, rhonchi or rales  Abdominal:      General: Bowel sounds are normal  There is no distension  Palpations: Abdomen is soft  There is no mass  Tenderness: There is abdominal tenderness  There is no guarding or rebound  Hernia: No hernia is present  Comments: Abdomen tender to palpation lower abdominal area  Musculoskeletal:      Cervical back: Normal range of motion and neck supple  Right lower leg: No edema  Left lower leg: No edema  Skin:     General: Skin is warm and dry  Capillary Refill: Capillary refill takes less than 2 seconds  Coloration: Skin is not jaundiced or pale  Neurological:      Mental Status: He is alert and oriented to person, place, and time     Psychiatric:         Mood and Affect: Mood normal            Lab Results:   Recent Results (from the past 24 hour(s))   CBC and differential    Collection Time: 07/11/22 10:17 PM   Result Value Ref Range    WBC 10 54 (H) 4 31 - 10 16 Thousand/uL    RBC 6 58 (H) 3 88 - 5 62 Million/uL    Hemoglobin 12 4 12 0 - 17 0 g/dL    Hematocrit 42 2 36 5 - 49 3 %    MCV 64 (L) 82 - 98 fL    MCH 18 8 (L) 26 8 - 34 3 pg    MCHC 29 4 (L) 31 4 - 37 4 g/dL    RDW 19 0 (H) 11 6 - 15 1 %    MPV 9 1 8 9 - 12 7 fL    Platelets 346 (H) 666 - 390 Thousands/uL    nRBC 0 /100 WBCs    Neutrophils Relative 76 (H) 43 - 75 % Immat GRANS % 1 0 - 2 %    Lymphocytes Relative 12 (L) 14 - 44 %    Monocytes Relative 9 4 - 12 %    Eosinophils Relative 1 0 - 6 %    Basophils Relative 1 0 - 1 %    Neutrophils Absolute 8 08 (H) 1 85 - 7 62 Thousands/µL    Immature Grans Absolute 0 05 0 00 - 0 20 Thousand/uL    Lymphocytes Absolute 1 24 0 60 - 4 47 Thousands/µL    Monocytes Absolute 0 99 0 17 - 1 22 Thousand/µL    Eosinophils Absolute 0 10 0 00 - 0 61 Thousand/µL    Basophils Absolute 0 08 0 00 - 0 10 Thousands/µL   Comprehensive metabolic panel    Collection Time: 07/11/22 10:17 PM   Result Value Ref Range    Sodium 140 136 - 145 mmol/L    Potassium 4 5 3 5 - 5 3 mmol/L    Chloride 102 100 - 108 mmol/L    CO2 30 21 - 32 mmol/L    ANION GAP 8 4 - 13 mmol/L    BUN 13 5 - 25 mg/dL    Creatinine 1 27 0 60 - 1 30 mg/dL    Glucose 84 65 - 140 mg/dL    Calcium 9 1 8 3 - 10 1 mg/dL    AST 46 (H) 5 - 45 U/L    ALT 32 12 - 78 U/L    Alkaline Phosphatase 83 46 - 116 U/L    Total Protein 7 7 6 4 - 8 2 g/dL    Albumin 3 8 3 5 - 5 0 g/dL    Total Bilirubin 0 85 0 20 - 1 00 mg/dL    eGFR 75 ml/min/1 73sq m   Magnesium    Collection Time: 07/11/22 10:17 PM   Result Value Ref Range    Magnesium 2 0 1 6 - 2 6 mg/dL   Lactic acid    Collection Time: 07/11/22 10:17 PM   Result Value Ref Range    LACTIC ACID 0 7 0 5 - 2 0 mmol/L   UA w Reflex to Microscopic w Reflex to Culture    Collection Time: 07/12/22  1:17 AM    Specimen: Urine, Clean Catch   Result Value Ref Range    Color, UA Yellow     Clarity, UA Slightly Cloudy     Specific [place], UA 1 015 1 000 - 1 030    pH, UA 7 5 5 0, 5 5, 6 0, 6 5, 7 0, 7 5, 8 0, 8 5, 9 0    Leukocytes, UA Negative Negative    Nitrite, UA Negative Negative    Protein, UA Negative Negative mg/dl    Glucose, UA Negative Negative mg/dl    Ketones, UA Negative Negative mg/dl    Urobilinogen, UA 0 2 0 2, 1 0 E U /dl E U /dl    Bilirubin, UA Negative Negative    Occult Blood, UA Negative Negative   COVID only    Collection Time: 07/12/22  4:02 AM    Specimen: Nose; Nares   Result Value Ref Range    SARS-CoV-2 Negative Negative   Basic metabolic panel    Collection Time: 07/12/22  6:20 AM   Result Value Ref Range    Sodium 136 136 - 145 mmol/L    Potassium 3 6 3 5 - 5 3 mmol/L    Chloride 102 100 - 108 mmol/L    CO2 28 21 - 32 mmol/L    ANION GAP 6 4 - 13 mmol/L    BUN 11 5 - 25 mg/dL    Creatinine 0 96 0 60 - 1 30 mg/dL    Glucose 92 65 - 140 mg/dL    Glucose, Fasting 92 65 - 99 mg/dL    Calcium 8 2 (L) 8 3 - 10 1 mg/dL    eGFR 106 ml/min/1 73sq m   CBC and differential    Collection Time: 07/12/22  6:20 AM   Result Value Ref Range    WBC 11 72 (H) 4 31 - 10 16 Thousand/uL    RBC 5 59 3 88 - 5 62 Million/uL    Hemoglobin 10 6 (L) 12 0 - 17 0 g/dL    Hematocrit 35 6 (L) 36 5 - 49 3 %    MCV 64 (L) 82 - 98 fL    MCH 19 0 (L) 26 8 - 34 3 pg    MCHC 29 8 (L) 31 4 - 37 4 g/dL    RDW 17 8 (H) 11 6 - 15 1 %    MPV 8 5 (L) 8 9 - 12 7 fL    Platelets 516 821 - 445 Thousands/uL    nRBC 0 /100 WBCs    Neutrophils Relative 79 (H) 43 - 75 %    Immat GRANS % 1 0 - 2 %    Lymphocytes Relative 12 (L) 14 - 44 %    Monocytes Relative 6 4 - 12 %    Eosinophils Relative 1 0 - 6 %    Basophils Relative 1 0 - 1 %    Neutrophils Absolute 9 38 (H) 1 85 - 7 62 Thousands/µL    Immature Grans Absolute 0 06 0 00 - 0 20 Thousand/uL    Lymphocytes Absolute 1 36 0 60 - 4 47 Thousands/µL    Monocytes Absolute 0 68 0 17 - 1 22 Thousand/µL    Eosinophils Absolute 0 15 0 00 - 0 61 Thousand/µL    Basophils Absolute 0 09 0 00 - 0 10 Thousands/µL           Imaging Studies: CT abdomen pelvis wo contrast    Result Date: 7/12/2022  Narrative: CT ABDOMEN AND PELVIS WITHOUT IV CONTRAST INDICATION:   r/o obstruction  COMPARISON:  July 6, 2022 TECHNIQUE:  CT examination of the abdomen and pelvis was performed without intravenous contrast  This examination was performed without intravenous contrast in the context of the critical nationwide Omnipaque shortage   Axial, sagittal, and coronal 2D reformatted images were created from the source data and submitted for interpretation  Radiation dose length product (DLP) for this visit:  461 61 mGy-cm   This examination, like all CT scans performed in the [facility], was performed utilizing techniques to minimize radiation dose exposure, including the use of iterative  reconstruction and automated exposure control  Enteric contrast was administered  FINDINGS: ABDOMEN LOWER CHEST:  No clinically significant abnormality identified in the visualized lower chest  LIVER/BILIARY TREE:  Unremarkable  GALLBLADDER:  No calcified gallstones  No pericholecystic inflammatory change  SPLEEN:  Unremarkable  PANCREAS:  Unremarkable  ADRENAL GLANDS:  Unremarkable  KIDNEYS/URETERS:  Unremarkable  No hydronephrosis  STOMACH AND BOWEL:  Postsurgical changes of colectomy with ileal pouch and ileoanal anastomosis  There is marked distention of the ileoanal anastomosis  Contrast is seen up to the anastomotic sutures at the ileum significant stranding around the ileoanal anastomosis is seen  APPENDIX:  No findings to suggest appendicitis  ABDOMINOPELVIC CAVITY:  Stable enlarged right pelvic lymph nodes  VESSELS:  Unremarkable for patient's age  PELVIS REPRODUCTIVE ORGANS:  Unremarkable for patient's age  URINARY BLADDER:  Unremarkable  ABDOMINAL WALL/INGUINAL REGIONS:  Unremarkable  OSSEOUS STRUCTURES:  No acute fracture or destructive osseous lesion  Impression: Markedly distended ileoanal anastomosis with surrounding inflammatory changes  Findings may represent infection versus inflammatory cannot rule out ischemia)  Follow-up is recommended  The study was marked in [facility] for immediate notification  Workstation performed: [id number]     CT abdomen pelvis wo contrast    Result Date: 7/6/2022  Narrative: CT ABDOMEN AND PELVIS WITHOUT IV CONTRAST INDICATION:   Abdominal pain, acute, nonlocalized abdominal pain  COMPARISON:  6/5/2022   TECHNIQUE:  CT examination of the abdomen and pelvis was performed without intravenous contrast  This examination was performed without intravenous contrast in the context of the critical nationwide Omnipaque shortage  Axial, sagittal, and coronal 2D reformatted images were created from the source data and submitted for interpretation  Radiation dose length product (DLP) for this visit:  471 21 mGy-cm   This examination, like all CT scans performed in the [facility], was performed utilizing techniques to minimize radiation dose exposure, including the use of iterative  reconstruction and automated exposure control  Enteric contrast was not administered  FINDINGS: ABDOMEN LOWER CHEST:  No clinically significant abnormality identified in the visualized lower chest  LIVER/BILIARY TREE:  Unremarkable  GALLBLADDER:  No calcified gallstones  No pericholecystic inflammatory change  SPLEEN:  Unremarkable  PANCREAS:  Unremarkable  ADRENAL GLANDS:  Unremarkable  KIDNEYS/URETERS:  Unremarkable  No hydronephrosis  STOMACH AND BOWEL:  Redemonstrated postsurgical changes of colectomy with ileal pouch and ileoanal anastomosis  There are segmentally dilated loops of small bowel in the upper hemiabdomen measuring up to 7 cm in maximal transverse dimension with air-fluid levels  Multiple collapsed loops of small bowel are seen in the midabdomen  There is increased distention of the ileal pouch with air and fecal debris to the level of the ileoanal anastomosis compared to prior recent examinations  APPENDIX:  Surgically absent  ABDOMINOPELVIC CAVITY:  No ascites  No pneumoperitoneum  Stable enlarged right pelvic lymph node  No new lymphadenopathy  VESSELS:  Unremarkable for patient's age  PELVIS REPRODUCTIVE ORGANS:  Unremarkable for patient's age  URINARY BLADDER:  Unremarkable  ABDOMINAL WALL/INGUINAL REGIONS:  Unremarkable  OSSEOUS STRUCTURES:  No acute fracture or destructive osseous lesion       Impression: Abnormally dilated small bowel loops in the upper hemiabdomen measuring up to 7 cm and with air-fluid levels concerning for small bowel obstruction  ?  Transition point at the right upper quadrant surgical anastomosis  No free air  Surgical consult advised  Above findings discussed with Dr Mary Lobato at 4:45 AM on 7/6/2022  Workstation performed: [id number]       Problem List:   Patient Active Problem List   Diagnosis    Chronic ulcerative pancolitis ([address] )    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 [address] [facility]    Stricture of rectum    CAR (generalized anxiety disorder)    BMI 26 0-26 9,adult    History of ulcerative colitis    Hyponatremia    Complications of intestinal pouch (HCC)    Left groin pain    Left-sided low back pain without sciatica    Sacroiliac joint dysfunction of right side    Arthralgia    Arm and leg movements, uncontrollable    Seizure-like activity (HCC)    Ankylosing spondylitis (HCC)    Elevated LFTs    Enteritis    Anemia    Polymicrobial after streptococcal bacteremia    Presumed AV endocarditis    Epigastric abdominal pain    Abnormal CT scan of lung    Rectal obstruction    History of Clostridium difficile infection    Partial small bowel obstruction ([facility] [address] [facility]    Abdominal pain         [place]      Please Note: "This note has been constructed using a voice recognition system  Therefore there may be syntax, spelling, and/or grammatical errors   Please call if you have any questions  "**

## 2022-07-12 NOTE — H&P
[address]  64   H&P- Nikia Arriola 1995, 32 y o  male MRN: [id number]  Unit/Bed#: ED 09 Encounter: [id number]  Primary Care Provider: Debra Peter DO   Date and time admitted to hospital: 7/11/2022  8:52 PM    Abdominal pain  Assessment & Plan  Patient presents with severe lower abdominal pain, nausea  · Patient with frequent ER visits and admissions secondary to abdominal pain, 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  · Follows with surgeon in [place] who is considering revision per patient  · CT abdomen shows markedly distended ileoanal anastomosis with surrounding inflammatory changes  Findings may represent infection versus inflammatory cannot rule out ischemia)  Follow-up is recommended  · WBC 10 54, afebrile  · Given dilaudid 2mg, morphine 8mg, and toradol in ED without improvement  · Given pantoprazole, dexamethasone in ED  · Supportive treatment - IVFs, pain medication, zofran, NPO  · Consult GI    Ankylosing spondylitis ([facility] [address] )  Assessment & Plan  Continue sulfasalazine    History of ulcerative colitis  Assessment & Plan  History of ulcerative colitis with total colectomy and J-pouch - stable, has ileal pouch and end-to-end anastomosis, and end anastomosis has been prone to experiencing stricture and needing endoscopy with balloon angioplasty in the past  · No recent flares    CAR (generalized anxiety disorder)  Assessment & Plan  Continue cymbalta    VTE Pharmacologic Prophylaxis: VTE Score: 2 Low Risk (Score 0-2) - Encourage Ambulation  Code Status: Full code  Discussion with family: Patient declined call to   Anticipated Length of Stay: Patient will be admitted on an observation basis with an anticipated length of stay of less than 2 midnights secondary to intractable abdominal pain      Total Time for Visit, including Counseling / Coordination of Care: 45 minutes Greater than 50% of this total time spent on direct patient counseling and coordination of care  Chief Complaint: abdominal pain    History of Present Illness:  Rodrick Miguel is a 32 y o  male with a PMH of ulcerative colitis, ankylosing spondylitis, anxiety, ileal pouchitis who presents with lower abdominal pain  Patient states that he has been having severe abdominal pain for the past 2 days  He was seen in the ED yesterday and sent home with pain management referral, narcotics but is still having pain today  He states that he has not been able to eat anything and is nauseous  Denies vomiting, constipation  Denies fevers, chills, chest pain, palpitations, SOB, cough, dysuria, hematuria  Patient will be admitted due to intractable pain  Review of Systems:  Review of Systems   Constitutional: Positive for appetite change  Negative for chills and fever  HENT: Negative for ear pain and sore throat  Eyes: Negative for pain and visual disturbance  Respiratory: Negative for cough and shortness of breath  Cardiovascular: Negative for chest pain and palpitations  Gastrointestinal: Positive for abdominal pain and nausea  Negative for vomiting  Genitourinary: Negative for dysuria and hematuria  Musculoskeletal: Negative for arthralgias and back pain  Skin: Negative for color change and rash  Neurological: Negative for seizures and syncope  All other systems reviewed and are negative        Past Medical and Surgical History:   Past Medical History:   Diagnosis Date    [facility]ylosing spondylitis ([facility] [address] )     Anxiety     Bowel obstruction (HCC)     Clostridium difficile colitis 9/13/2018    Colitis     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[facility] ) 9/13/2018    Pancreatitis     Ulcerative colitis ([facility] )        Past Surgical History:   Procedure Laterality Date    COLECTOMY TOTAL      with ileal pouch and anastemosis    IR PICC PLACEMENT SINGLE LUMEN  3/1/2022    TOTAL COLECTOMY         Meds/Allergies:  Prior to Admission medications    Medication Sig Start Date End Date Taking? Authorizing Provider   ciprofloxacin (CIPRO) 500 mg tablet  6/8/22   Historical Provider, MD   DULoxetine (CYMBALTA) 60 mg delayed release capsule Take 1 capsule (60 mg total) by mouth daily 6/10/22   Genet Birmingham DO   hydrOXYzine HCL (ATARAX) 25 mg tablet 1-2 po q6hrs prn anxiety 6/10/22   Genet Birmingham DO   methylPREDNISolone 4 MG tablet therapy pack Use as directed on package 7/8/22 7/14/22  Severino Cruz PA-C   metroNIDAZOLE (FLAGYL) 500 mg tablet  6/8/22   Historical Provider, MD   morphine (MSIR) 30 MG tablet Take 1 tablet (30 mg total) by mouth every 6 (six) hours as needed for severe pain for up to 2 days Max Daily Amount: 120 mg 7/11/22 7/13/22  Kaylen Arrieta MD   pantoprazole (PROTONIX) 20 mg tablet Take 1 tablet (20 mg total) by mouth daily 7/8/22   Severino Crzu PA-C   sulfaSALAzine (AZULFIDINE) 500 mg tablet Take 2 tablets (1,000 mg total) by mouth 2 (two) times a day 6/1/22   Shirley Brown DO     I have reviewed home medications with patient personally  Allergies: Allergies   Allergen Reactions    Mesalamine GI Intolerance     Other reaction(s): Pancreatitis  Annotation - [id number]: pancreatitis    Cefazolin Hives       Social History:  Marital Status: Single   Occupation:   Patient Pre-hospital Living Situation: Home  Patient Pre-hospital Level of Mobility: walks  Patient Pre-hospital Diet Restrictions:   Substance Use History:   Social History     Substance and Sexual Activity   Alcohol Use Yes    Comment: pt states 5 a month/socially     Social History     Tobacco Use   Smoking Status Never Smoker   Smokeless Tobacco Never Used     Social History     Substance and Sexual Activity   Drug Use No       Family History:  History reviewed  No pertinent family history      Physical Exam:     Vitals:   Blood Pressure: 130/78 (07/12/22 0115)  Pulse: 86 (07/12/22 0115)  Temperature: 97 7 °F (36 5 °C) (07/11/22 2105)  Temp Source: Temporal (07/11/22 2105)  Respirations: 16 (07/12/22 0115)  SpO2: 98 % (07/12/22 0115)    Physical Exam  Vitals reviewed  Constitutional:       Appearance: He is well-developed  HENT:      Head: Normocephalic and atraumatic  Eyes:      Conjunctiva/sclera: Conjunctivae normal    Cardiovascular:      Rate and Rhythm: Normal rate and regular rhythm  Heart sounds: No murmur heard  Pulmonary:      Effort: Pulmonary effort is normal  No respiratory distress  Breath sounds: Normal breath sounds  Abdominal:      General: There is no distension  Palpations: Abdomen is soft  Tenderness: There is abdominal tenderness  There is no guarding  Comments: Lower abdominal tenderness   Skin:     General: Skin is warm and dry  Neurological:      Mental Status: He is alert  Additional Data:     Lab Results:  Results from last 7 days   Lab Units 07/11/22 2217 07/07/22  0556 07/06/22  0328   WBC Thousand/uL 10 54*   < > 13 69*   HEMOGLOBIN g/dL 12 4   < > 12 1   HEMATOCRIT % 42 2   < > 40 8   PLATELETS Thousands/uL 453*   < > 447*   BANDS PCT %  --   --  3   NEUTROS PCT % 76*   < >  --    LYMPHS PCT % 12*   < >  --    LYMPHO PCT %  --   --  7*   MONOS PCT % 9   < >  --    MONO PCT %  --   --  1*   EOS PCT % 1   < > 0    < > = values in this interval not displayed       Results from last 7 days   Lab Units 07/11/22 2217   SODIUM mmol/L 140   POTASSIUM mmol/L 4 5   CHLORIDE mmol/L 102   CO2 mmol/L 30   BUN mg/dL 13   CREATININE mg/dL 1 27   ANION GAP mmol/L 8   CALCIUM mg/dL 9 1   ALBUMIN g/dL 3 8   TOTAL BILIRUBIN mg/dL 0 85   ALK PHOS U/L 83   ALT U/L 32   AST U/L 46*   GLUCOSE RANDOM mg/dL 84     Results from last 7 days   Lab Units 07/06/22  0328   INR  1 00             Results from last 7 days   Lab Units 07/11/22 2217 07/11/22  0248 07/10/22  0234 07/06/22  0527   LACTIC ACID mmol/L 0 7 1 7 1 2 1 5       Imaging: Reviewed radiology reports from this admission including: abdominal/pelvic CT  CT abdomen pelvis wo contrast   Final Result by Corey Valdes DO (07/12 0258)      Markedly distended ileoanal anastomosis with surrounding inflammatory changes  Findings may represent infection versus inflammatory cannot rule out ischemia)  Follow-up is recommended  The study was marked in [facility] for immediate notification  Workstation performed: [id number]             EKG and Other Studies Reviewed on Admission:   · EKG: No EKG obtained  ** Please Note: This note has been constructed using a voice recognition system   **

## 2022-07-12 NOTE — PLAN OF CARE
Problem: PAIN - ADULT  Goal: Verbalizes/displays adequate comfort level or baseline comfort level  Description: Interventions:  - Encourage patient to monitor pain and request assistance  - Assess pain using appropriate pain scale  - Administer analgesics based on type and severity of pain and evaluate response  - Implement non-pharmacological measures as appropriate and evaluate response  - Consider cultural and social influences on pain and pain management  - Notify physician/advanced practitioner if interventions unsuccessful or patient reports new pain  Outcome: Progressing     Problem: INFECTION - ADULT  Goal: Absence of fever/infection during neutropenic period  Description: INTERVENTIONS:  - Monitor WBC    Outcome: Progressing     Problem: GASTROINTESTINAL - ADULT  Goal: Minimal or absence of nausea and/or vomiting  Description: INTERVENTIONS:  - Administer IV fluids if ordered to ensure adequate hydration  - Maintain NPO status until nausea and vomiting are resolved  - Nasogastric tube if ordered  - Administer ordered antiemetic medications as needed  - Provide nonpharmacologic comfort measures as appropriate  - Advance diet as tolerated, if ordered  - Consider nutrition services referral to assist patient with adequate nutrition and appropriate food choices  Outcome: Progressing

## 2022-07-12 NOTE — ASSESSMENT & PLAN NOTE
History of ulcerative colitis with total colectomy and J-pouch - stable, has ileal pouch and end-to-end anastomosis, and end anastomosis has been prone to experiencing stricture and needing endoscopy with balloon angioplasty in the past  · No recent flares

## 2022-07-12 NOTE — PLAN OF CARE
Problem: PAIN - ADULT  Goal: Verbalizes/displays adequate comfort level or baseline comfort level  Description: Interventions:  - Encourage patient to monitor pain and request assistance  - Assess pain using appropriate pain scale  - Administer analgesics based on type and severity of pain and evaluate response  - Implement non-pharmacological measures as appropriate and evaluate response  - Consider cultural and social influences on pain and pain management  - Notify physician/advanced practitioner if interventions unsuccessful or patient reports new pain  Outcome: Progressing  Patient receiving IV morphine for pain control     Problem: INFECTION - ADULT  Goal: Absence of fever/infection during neutropenic period  Description: INTERVENTIONS:  - Monitor WBC    Outcome: Progressing  WBC 11 72 today     Problem: GASTROINTESTINAL - ADULT  Goal: Minimal or absence of nausea and/or vomiting  Description: INTERVENTIONS:  - Administer IV fluids to ensure adequate hydration  - Administer ordered antiemetic medications as needed  - Provide nonpharmacologic comfort measures as appropriate  - Advance diet as tolerated, if ordered  Outcome: Progressing

## 2022-07-13 ENCOUNTER — PATIENT OUTREACH (OUTPATIENT)
Dept: CASE MANAGEMENT | Facility: OTHER | Age: 27
End: 2022-07-13

## 2022-07-13 LAB
ALBUMIN SERPL BCP-MCNC: 3.6 G/DL (ref 3.5–5)
ALP SERPL-CCNC: 73 U/L (ref 46–116)
ALT SERPL W P-5'-P-CCNC: 23 U/L (ref 12–78)
ANION GAP SERPL CALCULATED.3IONS-SCNC: 10 MMOL/L (ref 4–13)
AST SERPL W P-5'-P-CCNC: 13 U/L (ref 5–45)
BILIRUB SERPL-MCNC: 0.72 MG/DL (ref 0.2–1)
BUN SERPL-MCNC: 14 MG/DL (ref 5–25)
CALCIUM SERPL-MCNC: 8.6 MG/DL (ref 8.3–10.1)
CHLORIDE SERPL-SCNC: 101 MMOL/L (ref 100–108)
CO2 SERPL-SCNC: 26 MMOL/L (ref 21–32)
CREAT SERPL-MCNC: 0.99 MG/DL (ref 0.6–1.3)
ERYTHROCYTE [DISTWIDTH] IN BLOOD BY AUTOMATED COUNT: 18 % (ref 11.6–15.1)
GFR SERPL CREATININE-BSD FRML MDRD: 102 ML/MIN/1.73SQ M
GLUCOSE P FAST SERPL-MCNC: 103 MG/DL (ref 65–99)
GLUCOSE SERPL-MCNC: 103 MG/DL (ref 65–140)
HCT VFR BLD AUTO: 37 % (ref 36.5–49.3)
HGB BLD-MCNC: 11.1 G/DL (ref 12–17)
MCH RBC QN AUTO: 18.8 PG (ref 26.8–34.3)
MCHC RBC AUTO-ENTMCNC: 30 G/DL (ref 31.4–37.4)
MCV RBC AUTO: 63 FL (ref 82–98)
PLATELET # BLD AUTO: 418 THOUSANDS/UL (ref 149–390)
PMV BLD AUTO: 8.9 FL (ref 8.9–12.7)
POTASSIUM SERPL-SCNC: 4 MMOL/L (ref 3.5–5.3)
PROT SERPL-MCNC: 7.2 G/DL (ref 6.4–8.2)
RBC # BLD AUTO: 5.89 MILLION/UL (ref 3.88–5.62)
SODIUM SERPL-SCNC: 137 MMOL/L (ref 136–145)
WBC # BLD AUTO: 13.08 THOUSAND/UL (ref 4.31–10.16)

## 2022-07-13 PROCEDURE — 99232 SBSQ HOSP IP/OBS MODERATE 35: CPT | Performed by: NURSE PRACTITIONER

## 2022-07-13 PROCEDURE — 80053 COMPREHEN METABOLIC PANEL: CPT | Performed by: FAMILY MEDICINE

## 2022-07-13 PROCEDURE — 85027 COMPLETE CBC AUTOMATED: CPT | Performed by: FAMILY MEDICINE

## 2022-07-13 PROCEDURE — 99232 SBSQ HOSP IP/OBS MODERATE 35: CPT | Performed by: FAMILY MEDICINE

## 2022-07-13 RX ORDER — SULFASALAZINE 500 MG/1
1000 TABLET ORAL 2 TIMES DAILY
Status: DISCONTINUED | OUTPATIENT
Start: 2022-07-13 | End: 2022-07-14 | Stop reason: HOSPADM

## 2022-07-13 RX ADMIN — MORPHINE SULFATE 4 MG: 4 INJECTION INTRAVENOUS at 23:01

## 2022-07-13 RX ADMIN — CIPROFLOXACIN 400 MG: 2 INJECTION, SOLUTION INTRAVENOUS at 09:47

## 2022-07-13 RX ADMIN — METHYLPREDNISOLONE SODIUM SUCCINATE 20 MG: 40 INJECTION, POWDER, FOR SOLUTION INTRAMUSCULAR; INTRAVENOUS at 17:56

## 2022-07-13 RX ADMIN — DULOXETINE HYDROCHLORIDE 60 MG: 60 CAPSULE, DELAYED RELEASE ORAL at 08:13

## 2022-07-13 RX ADMIN — SODIUM CHLORIDE, SODIUM GLUCONATE, SODIUM ACETATE, POTASSIUM CHLORIDE, MAGNESIUM CHLORIDE, SODIUM PHOSPHATE, DIBASIC, AND POTASSIUM PHOSPHATE 100 ML/HR: .53; .5; .37; .037; .03; .012; .00082 INJECTION, SOLUTION INTRAVENOUS at 13:52

## 2022-07-13 RX ADMIN — SULFASALAZINE 1000 MG: 500 TABLET ORAL at 17:55

## 2022-07-13 RX ADMIN — METRONIDAZOLE 500 MG: 500 INJECTION, SOLUTION INTRAVENOUS at 12:22

## 2022-07-13 RX ADMIN — MORPHINE SULFATE 4 MG: 4 INJECTION INTRAVENOUS at 12:18

## 2022-07-13 RX ADMIN — METRONIDAZOLE 500 MG: 500 INJECTION, SOLUTION INTRAVENOUS at 01:46

## 2022-07-13 RX ADMIN — ONDANSETRON 4 MG: 2 INJECTION INTRAMUSCULAR; INTRAVENOUS at 23:01

## 2022-07-13 RX ADMIN — MORPHINE SULFATE 4 MG: 4 INJECTION INTRAVENOUS at 05:06

## 2022-07-13 RX ADMIN — SULFASALAZINE 1000 MG: 500 TABLET ORAL at 09:46

## 2022-07-13 RX ADMIN — MORPHINE SULFATE 4 MG: 4 INJECTION INTRAVENOUS at 01:58

## 2022-07-13 RX ADMIN — MORPHINE SULFATE 4 MG: 4 INJECTION INTRAVENOUS at 19:29

## 2022-07-13 RX ADMIN — MORPHINE SULFATE 4 MG: 4 INJECTION INTRAVENOUS at 15:29

## 2022-07-13 RX ADMIN — MORPHINE SULFATE 4 MG: 4 INJECTION INTRAVENOUS at 08:13

## 2022-07-13 RX ADMIN — METHYLPREDNISOLONE SODIUM SUCCINATE 20 MG: 40 INJECTION, POWDER, FOR SOLUTION INTRAMUSCULAR; INTRAVENOUS at 09:47

## 2022-07-13 RX ADMIN — METRONIDAZOLE 500 MG: 500 INJECTION, SOLUTION INTRAVENOUS at 17:57

## 2022-07-13 RX ADMIN — CIPROFLOXACIN 400 MG: 2 INJECTION, SOLUTION INTRAVENOUS at 21:21

## 2022-07-13 RX ADMIN — METHYLPREDNISOLONE SODIUM SUCCINATE 20 MG: 40 INJECTION, POWDER, FOR SOLUTION INTRAMUSCULAR; INTRAVENOUS at 01:46

## 2022-07-13 RX ADMIN — PANTOPRAZOLE SODIUM 20 MG: 20 TABLET, DELAYED RELEASE ORAL at 05:06

## 2022-07-13 NOTE — PLAN OF CARE
Problem: PAIN - ADULT  Goal: Verbalizes/displays adequate comfort level or baseline comfort level  Description: Interventions:  - Encourage patient to monitor pain and request assistance  - Assess pain using appropriate pain scale  - Administer analgesics based on type and severity of pain and evaluate response  - Implement non-pharmacological measures as appropriate and evaluate response  - Consider cultural and social influences on pain and pain management  - Notify physician/advanced practitioner if interventions unsuccessful or patient reports new pain  Outcome: Progressing  Patient receiving IV morphine for pain     Problem: GASTROINTESTINAL - ADULT  Goal: Minimal or absence of nausea and/or vomiting  Description: INTERVENTIONS:  - Administer IV fluids if ordered to ensure adequate hydration  - Administer ordered antiemetic medications as needed  - Provide nonpharmacologic comfort measures as appropriate  - Advance diet as tolerated, if ordered  - Consider nutrition services referral to assist patient with adequate nutrition and appropriate food choices  Outcome: Progressing  IV fluids ordered for patient  Diet advanced to clear liquids     Problem: INFECTION - ADULT  Goal: Absence of fever/infection during neutropenic period  Description: INTERVENTIONS:  - Monitor WBC    Outcome: Progressing  Monitoring WBC   13 08 today

## 2022-07-13 NOTE — ASSESSMENT & PLAN NOTE
History of ulcerative colitis with total colectomy and has ileal pouch and end-to-end anastomosis    End anastomosis with stricture in the past needing endoscopy with balloon angioplasty  · Advised to follow-up with surgeon in [place]

## 2022-07-13 NOTE — UTILIZATION REVIEW
Initial Clinical Review    Admission: Date/Time/Statement:   Admission Orders (From admission, onward)     Ordered        07/13/22 1526  Inpatient Admission  Once            07/12/22 0319  Place in Observation  Once                      Orders Placed This Encounter   Procedures    Inpatient Admission     Standing Status:   Standing     Number of Occurrences:   1     Order Specific Question:   Level of Care     Answer:   Med Surg [16]     Order Specific Question:   Estimated length of stay     Answer:   More than 2 Midnights     Order Specific Question:   Certification     Answer:   I certify that inpatient services are medically necessary for this patient for a duration of greater than two midnights  See H&P and MD Progress Notes for additional information about the patient's course of treatment  ED Arrival Information     Expected   -    Arrival   7/11/2022 20:51    Acuity   Urgent            Means of arrival   Walk-In    Escorted by   Self    Service   Hospitalist    Admission type   Urgent            Arrival complaint   -           Chief Complaint   Patient presents with    Abdominal Pain     Pt reporting intense abdominal pain when eating, reports pain worsening since this afternoon, hx of SBO, as well as extensive history of abdominal issues  Initial Presentation:   32 y o  male with a PMH of ulcerative colitis, ankylosing spondylitis, anxiety, ileal pouchitis who presents with lower abdominal pain  Patient states that he has been having severe abdominal pain for the past 2 days  He was seen in the ED yesterday and sent home with pain management referral, narcotics but is still having pain today  He states that he has not been able to eat anything and is nauseous  Placed in observation status  Abdominal pain - on imaging noted to have distended ileoanal anastomosis with inflammatory changes  Per GI, started on IV antibiotics and IV Solu-Medrol 20 mg q 8 hours  Continue to keep NPO    Pain medications p r n  If no improvement, may need repeat pouchoscopy     Per GI: abd pain   CT abdomen showed markedly distended ileal anal anastomosis with surrounding inflammatory changes  Findings may represent infection verses inflammatory cannot rule out ischemia  Positive leukocytosis, white blood count 10 54  Abdominal pain may be secondary to inflammatory bowel disease, infectious process, or ischemia   -Start Cipro 400 mg IV Q 12 hours  -Start Flagyl 500 mg IV Q 8 hours  -Start Solu-Medrol 20 mg IV Q 8 hours  -Zofran as needed for nausea or vomiting  -Keep NPO  -If no improvement in symptoms patient may need further evaluation with pouchoscopy  -Avoid NSAIDs      Observation to IP admission 7/13/22:  Using IV Morphine for abd pain control  Pain improved somewhat today  Persistent tenderness to palpation lower abd  Trial advancing diet to clear liquids, IVF maintained  IVABT & IV steroids in progress per GI for inflammatory changes on CT  Day 2- 7/14/22:  Pain still requiring IV Morphine Tolerating liquids  Trial advancing diet per GI with IVF maintained  Remains on IV steroids & IVABT at this time       ED Triage Vitals   Temperature Pulse Respirations Blood Pressure SpO2   07/11/22 2105 07/11/22 2105 07/11/22 2105 07/11/22 2213 07/11/22 2105   97 7 °F (36 5 °C) 89 16 134/76 99 %      Temp Source Heart Rate Source Patient Position - Orthostatic VS BP Location FiO2 (%)   07/11/22 2105 07/11/22 2105 07/11/22 2230 07/11/22 2230 --   Temporal Monitor Lying Left arm       Pain Score       07/11/22 2211       9          Wt Readings from Last 1 Encounters:   07/12/22 81 6 kg (180 lb)     Additional Vital Signs:   Date/Time Temp Pulse Resp BP MAP (mmHg) SpO2 O2 Device Patient Position - Orthostatic VS   07/14/22 08:17:21 97 9 °F (36 6 °C) 71 17 121/76 91 93 % None (Room air) Lying   07/13/22 23:16:39 97 9 °F (36 6 °C) 70 16 120/76 91 93 % -- --   07/13/22 1925 -- -- -- -- -- -- None (Room air) --   07/13/22 19:21:56 97 9 °F (36 6 °C) 68 20 123/73 90 91 % -- Sitting   07/13/22 08:11:28 97 6 °F (36 4 °C) 64 18 122/67 85 96 % -- --   07/13/22 0213 97 8 °F (36 6 °C) 74 -- -- -- 92 % -- --   07/12/22 22:55:48 98 °F (36 7 °C) 73 18 125/74 91 91 % -- --   07/12/22 2100 -- 74 -- -- -- 95 % -- --   07/12/22 19:27:44 97 6 °F (36 4 °C) 71 20 124/76 92 94 % -- Lying     Pertinent Labs/Diagnostic Test Results:   CT abdomen pelvis wo contrast   Final Result  (07/12 0258)      Markedly distended ileoanal anastomosis with surrounding inflammatory changes  Findings may represent infection versus inflammatory cannot rule out ischemia)  Follow-up is recommended       Results from last 7 days   Lab Units 07/12/22  0402   SARS-COV-2  Negative     Results from last 7 days   Lab Units 07/13/22  0517 07/12/22  0620 07/11/22 2217 07/11/22  0248 07/10/22  0234   WBC Thousand/uL 13 08* 11 72* 10 54* 7 86 9 34   HEMOGLOBIN g/dL 11 1* 10 6* 12 4 11 4* 11 8*   HEMATOCRIT % 37 0 35 6* 42 2 38 9 39 3   PLATELETS Thousands/uL 418* 340 453* 404* 443*   NEUTROS ABS Thousands/µL  --  9 38* 8 08* 5 16 6 35     Results from last 7 days   Lab Units 07/13/22  0517 07/12/22  0620 07/11/22 2217 07/11/22  0248 07/10/22  0234   SODIUM mmol/L 137 136 140 142 141   POTASSIUM mmol/L 4 0 3 6 4 5 3 2* 3 5   CHLORIDE mmol/L 101 102 102 104 104   CO2 mmol/L 26 28 30 31 28   ANION GAP mmol/L 10 6 8 7 9   BUN mg/dL 14 11 13 10 14   CREATININE mg/dL 0 99 0 96 1 27 1 15 1 22   EGFR ml/min/1 73sq m 102 106 75 85 79   CALCIUM mg/dL 8 6 8 2* 9 1 8 6 9 1   MAGNESIUM mg/dL  --   --  2 0  --   --      Results from last 7 days   Lab Units 07/13/22  0517 07/11/22  2217 07/10/22  0234   AST U/L 13 46* 13   ALT U/L 23 32 32   ALK PHOS U/L 73 83 85   TOTAL PROTEIN g/dL 7 2 7 7 7 2   ALBUMIN g/dL 3 6 3 8 3 6   TOTAL BILIRUBIN mg/dL 0 72 0 85 0 46     Results from last 7 days   Lab Units 07/13/22  0517 07/12/22  0620 07/11/22 2217 07/11/22  0248 07/10/22  0234   GLUCOSE RANDOM mg/dL 103 92 84 101 97     Results from last 7 days   Lab Units 07/11/22  2217 07/11/22  0248 07/10/22  0234   LACTIC ACID mmol/L 0 7 1 7 1 2     Results from last 7 days   Lab Units 07/10/22  0241 07/10/22  0234   CRP mg/L 4 0*  --    SED RATE mm/hour  --  14     Results from last 7 days   Lab Units 07/12/22  0117   CLARITY UA  Slightly Cloudy   COLOR UA  Yellow   SPEC GRAV UA  1 015   PH UA  7 5   GLUCOSE UA mg/dl Negative   KETONES UA mg/dl Negative   BLOOD UA  Negative   PROTEIN UA mg/dl Negative   NITRITE UA  Negative   BILIRUBIN UA  Negative   UROBILINOGEN UA E U /dl 0 2   LEUKOCYTES UA  Negative     ED Treatment:   Medication Administration from 07/11/2022 2051 to 07/12/2022 0558       Date/Time Order Dose Route Action     07/11/2022 2147 ondansetron (ZOFRAN-ODT) dispersible tablet 4 mg 4 mg Oral Given     07/11/2022 2213 sodium chloride 0 9 % bolus 1,000 mL 1,000 mL Intravenous New Bag     07/11/2022 2211 HYDROmorphone (DILAUDID) injection 1 mg 1 mg Intravenous Given     07/11/2022 2213 metoclopramide (REGLAN) injection 10 mg 10 mg Intravenous Given     07/11/2022 2303 HYDROmorphone (DILAUDID) injection 1 mg 1 mg Intravenous Given     07/11/2022 2326 barium (READI-CAT 2) suspension 900 mL 900 mL Oral Given     07/12/2022 0111 morphine injection 8 mg 8 mg Intravenous Given     07/12/2022 0110 ondansetron (ZOFRAN) injection 4 mg 4 mg Intravenous Given     07/12/2022 0354 multi-electrolyte (PLASMALYTE-A/ISOLYTE-S PH 7 4) IV solution 100 mL/hr Intravenous New Bag     07/12/2022 0354 ketorolac (TORADOL) injection 15 mg 15 mg Intravenous Given     07/12/2022 0354 Famotidine (PF) (PEPCID) injection 20 mg 20 mg Intravenous Given     07/12/2022 0354 dexamethasone oral liquid 10 mg 1 mL 10 mg Oral Given     07/12/2022 0521 HYDROmorphone (DILAUDID) injection 0 5 mg 0 5 mg Intravenous Given        Past Medical History:   Diagnosis Date    Ankylosing spondylitis (HCC)     Anxiety     [facility]el obstruction ([facility] [address] [facility]     Clostridium difficile colitis 9/13/2018    Colitis     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    Pancreatitis     Ulcerative colitis ([facility] [address] )      Present on Admission:   [facility]   CAR (generalized anxiety disorder)    Admitting Diagnosis: Abdominal pain [R10 9]  Age/Sex: 32 y o  male  Admission Orders:  Consult GI    Scheduled Medications:  ciprofloxacin, 400 mg, Intravenous, Q12H  DULoxetine, 60 mg, Oral, Daily  methylPREDNISolone sodium succinate, 20 mg, Intravenous, Q8H KAITLIN  metroNIDAZOLE, 500 mg, Intravenous, Q8H  pantoprazole, 20 mg, Oral, Daily  sulfaSALAzine, 1,000 mg, Oral, BID    Continuous IV Infusions:  multi-electrolyte, 100 mL/hr, Intravenous, Continuous    PRN Meds:  acetaminophen, 650 mg, Oral, Q6H PRN  morphine injection, 2 mg, Intravenous, Q3H PRN  morphine injection, 4 mg, Intravenous, Q3H PRN, 7/12 x3, 7/13 x7, 7/14 x4  ondansetron, 4 mg, Intravenous, Q6H PRN, 7/12 x2, 7/13 x1, 7/14 x1    Network Utilization Review Department  ATTENTION: Please call with any questions or concerns to [phone] and carefully listen to the prompts so that you are directed to the right person  All voicemails are confidential   Sudie Crigler all requests for admission clinical reviews, approved or denied determinations and any other requests to dedicated fax number below belonging to the campus where the patient is receiving treatment   List of dedicated fax numbers for the Facilities:  [address] DENIALS (Administrative/Medical Necessity) [phone]   [address] (Maternity/NICU/Pediatrics) [phone]   [address] [phone]   [address]  [address] [address] [place], [place] [address]e  [address] [facility] [place] [address] [address] [facility] [phone] [address] [address] [address] Maxwell [address] [phone]

## 2022-07-13 NOTE — PROGRESS NOTES
[facility] Internal [facility] Progress Note  Patient: Tatiana Jamil 32 y o  male   MRN: [id number]  PCP: Carlos Manuel Olviia DO  Unit/Bed#: [address] [place] [address] Encounter: [id number]  Date Of Visit: 07/13/22    Problem List:    Principal Problem:    Abdominal pain  Active Problems:    History of ulcerative colitis    CAR (generalized anxiety disorder)    Ankylosing spondylitis ([facility] [address] )      Assessment & Plan:    * Abdominal pain  Assessment & Plan  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  · His surgeon in [place] is considering revision per patient  · CT scan showed markedly distended ileoanal anastomosis with surrounding inflammatory changes - infectious versus inflammatory  · Given dilaudid 2mg, morphine 8mg, and toradol in ED without improvement  · Given pantoprazole, dexamethasone in ED  · Patient seen by GI and started on Cipro, Flagyl along with 20 mg of IV Solu-Medrol q 8 hours  · Continue IVF  · Trial of clear liquid diet which he is tolerating    History of ulcerative colitis  Assessment & Plan  History of ulcerative colitis with total colectomy and has ileal pouch and end-to-end anastomosis  End anastomosis with stricture in the past needing endoscopy with balloon angioplasty  · Follows up with surgeon in [place]    Ankylosing spondylitis [facility])  Assessment & Plan  Continue sulfasalazine    CAR (generalized anxiety disorder)  Assessment & Plan  Continue Cymbalta          VTE Pharmacologic Prophylaxis: VTE Score: 2 Low Risk (Score 0-2) - Encourage Ambulation  Patient Centered Rounds: I performed bedside rounds with nursing staff today  Discussions with Specialists or Other Care Team Provider: yes - GI    Education and Discussions with Family / Patient: yes    Time Spent for Care: 30 minutes   More than 50% of total time spent on counseling and coordination of care as described above  Current Length of Stay: 0 day(s)  Current Patient Status: Inpatient   Certification Statement: The patient will continue to require additional inpatient hospital stay due to Abdominal pain requiring initiation of diet and slow advancement of diet  Discharge Plan: Anticipate discharge in 24-48 hrs to home  Code Status: Level 1 - Full Code    Subjective:     Reports improvement in abdominal pain today compared to yesterday  Tolerating clears so far    Objective:     Vitals:   Temp (24hrs), Av 7 °F (36 5 °C), Min:97 6 °F (36 4 °C), Max:98 °F (36 7 °C)    Temp:  [97 6 °F (36 4 °C)-98 °F (36 7 °C)] 97 6 °F (36 4 °C)  HR:  [64-74] 64  Resp:  [18-20] 18  BP: (122-125)/(67-76) 122/67  SpO2:  [91 %-96 %] 96 %  Body mass index is 26 58 kg/m²  Input and Output Summary (last 24 hours): Intake/Output Summary (Last 24 hours) at 2022 1527  Last data filed at 2022 0457  Gross per 24 hour   Intake 300 ml   Output 2325 ml   Net -2025 ml       Physical Exam:   Physical Exam  Constitutional:       General: He is not in acute distress  Appearance: He is not diaphoretic  HENT:      Head: Normocephalic and atraumatic  Eyes:      General:         Right eye: No discharge  Left eye: No discharge  Cardiovascular:      Rate and Rhythm: Normal rate and regular rhythm  Pulmonary:      Effort: Pulmonary effort is normal  No respiratory distress  Breath sounds: Normal breath sounds  No wheezing or rales  Abdominal:      General: Bowel sounds are normal  There is no distension  Palpations: Abdomen is soft  Tenderness: There is abdominal tenderness (mild lower abdomen)  There is no guarding  Neurological:      Mental Status: He is alert and oriented to person, place, and time           Additional Data:     Labs:  Results from last 7 days   Lab Units 22  0517 22  0620   WBC Thousand/uL 13 08* 11 72*   HEMOGLOBIN g/dL 11 1* 10 6* HEMATOCRIT % 37 0 35 6*   PLATELETS Thousands/uL 418* 340   NEUTROS PCT %  --  79*   LYMPHS PCT %  --  12*   MONOS PCT %  --  6   EOS PCT %  --  1     Results from last 7 days   Lab Units 07/13/22  0517   SODIUM mmol/L 137   POTASSIUM mmol/L 4 0   CHLORIDE mmol/L 101   CO2 mmol/L 26   BUN mg/dL 14   CREATININE mg/dL 0 99   ANION GAP mmol/L 10   CALCIUM mg/dL 8 6   ALBUMIN g/dL 3 6   TOTAL BILIRUBIN mg/dL 0 72   ALK PHOS U/L 73   ALT U/L 23   AST U/L 13   GLUCOSE RANDOM mg/dL 103                 Results from last 7 days   Lab Units 07/11/22  2217 07/11/22  0248 07/10/22  0234   LACTIC ACID mmol/L 0 7 1 7 1 2       Lines/Drains:  Invasive Devices  Report    Peripheral Intravenous Line  Duration           Peripheral IV 07/11/22 Right Antecubital 1 day                Imaging: Reviewed radiology reports from this admission including: abdominal/pelvic CT    Recent Cultures (last 7 days):         Last 24 Hours Medication List:   Current Facility-Administered Medications   Medication Dose Route Frequency Provider Last Rate    acetaminophen  650 mg Oral Q6H PRN Maralee Bream, PA-C      ciprofloxacin  400 mg Intravenous Q12H Liliane Infante, CRNP 400 mg (07/13/22 0947)    DULoxetine  60 mg Oral Daily Emmanuelle Vecdamariso, PA-C      methylPREDNISolone sodium succinate  20 mg Intravenous Q8H [address] Liliane Babio, CRNP      metroNIDAZOLE  500 mg Intravenous Q8H Liliane Arelis, CRNP 500 mg (07/13/22 1222)    morphine injection  2 mg Intravenous Q3H PRN Darlin Revankar, DO      morphine injection  4 mg Intravenous Q3H PRN Darlin Revankar, DO      multi-electrolyte  100 mL/hr Intravenous Continuous Emmanuelle Vecellio, PA-C 100 mL/hr (07/13/22 1352)    ondansetron  4 mg Intravenous Q6H PRN Emmanuelle Vecellio, PA-C      pantoprazole  20 mg Oral Daily Emmanuelle Vecellio, PA-C      sulfaSALAzine  1,000 mg Oral BID Maralegraciela Smitham, PA-C          Today, Patient Was Seen By: Shirley Brown DO    ** Please Note: "This note has been constructed using a voice recognition system  Therefore there may be syntax, spelling, and/or grammatical errors   Please call if you have any questions  "**

## 2022-07-13 NOTE — PROGRESS NOTES
Outpatient Care Management   7/13/22  Received a referral for a potential  [address]  Committee will review  Rising Utilizer Episode created and update to Diana Trinidad  7/19/22  Update received from Diana Trinidad

## 2022-07-13 NOTE — ASSESSMENT & PLAN NOTE
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  · His surgeon in [place] is considering revision per patient  Patient advised to follow-up with surgeon after discharge  · CT scan showed markedly distended ileoanal anastomosis with surrounding inflammatory changes - infectious versus inflammatory  · Given dilaudid 2mg, morphine 8mg, and toradol in ED without improvement  Also received Protonix, dexamethasone in the ER  · Patient seen by GI and started on Cipro, Flagyl along with 20 mg of IV Solu-Medrol q 8 hours  · Per GI, discharged on Flagyl t i d  For 1 month and prednisone starting at 60 mg which will be decreased by 10 mg every 7 days until he completes it  · Diet was slowly advanced which she is tolerating    Abdominal pain has also improved

## 2022-07-13 NOTE — PROGRESS NOTES
Progress Note - [facility]  Mcihelle Evanston 32 y o  male MRN: [id number]    Unit/Bed#: [address] Encounter: [id number]      Assessment/Plan:  1  Abdomen pain  Patient provide consented with severe lower abdominal pain shave  Patient has history of pouchitis, and recurrent anastomotic strictures leading to small-bowel obstruction  Patient follows with surgeon in the [place] who is considering revision per patient  CT abdomen showed markedly distended ileal anal anastomosis with surrounding inflammatory changes  Findings may represent infection verses inflammatory cannot rule out ischemia  Positive leukocytosis, white blood count 10 54  Abdominal pain may be secondary to inflammatory bowel disease, infectious process, or ischemia  Patient reports that pain is better today  Continues with intermittent nausea but no vomiting  Patient would like to try clear liquid diet   -Start Cipro 400 mg IV Q 12 hours  -Start Flagyl 500 mg IV Q 8 hours  -Start Solu-Medrol 20 mg IV Q 8 hours  -Pain management per attending  -Zofran as needed for nausea or vomiting  -clear liquid diet advanced as tolerated to low residue low-fiber   -Continue supportive care and IV fluid hydration   -If no improvement in symptoms patient may need further evaluation with pouchoscopy or MR enterography    -Avoid NSAIDs    Subjective:   Lying in bed in no acute distress  Patient reports pain is improving from yesterday  Patient reports continued intermittent nausea but no vomiting  Patient reports no increase in bowel movements or blood in stool  Objective:     Vitals: Blood pressure 125/74, pulse 74, temperature 97 8 °F (36 6 °C), resp  rate 18, height 5' 9" (1 753 m), weight 81 6 kg (180 lb), SpO2 92 %  ,Body mass index is 26 58 kg/m²        Intake/Output Summary (Last 24 hours) at 7/13/2022 0810  Last data filed at 7/13/2022 0457  Gross per 24 hour   Intake 300 ml   Output 2325 ml   Net -2025 ml       Physical Exam: Physical Exam  Vitals reviewed  Constitutional:       General: He is not in acute distress  Cardiovascular:      Rate and Rhythm: Normal rate and regular rhythm  Pulses: Normal pulses  Heart sounds: Normal heart sounds  Pulmonary:      Effort: Pulmonary effort is normal  No respiratory distress  Breath sounds: Normal breath sounds  No stridor  No wheezing, rhonchi or rales  Abdominal:      General: Bowel sounds are normal  There is no distension  Palpations: Abdomen is soft  There is no mass  Tenderness: There is abdominal tenderness  There is no guarding or rebound  Hernia: No hernia is present  Comments: Tender to palpation in lower abdomen  Musculoskeletal:      Right lower leg: No edema  Left lower leg: No edema  Skin:     General: Skin is dry  Capillary Refill: Capillary refill takes less than 2 seconds  Coloration: Skin is not jaundiced or pale  Neurological:      Mental Status: He is alert and oriented to person, place, and time     Psychiatric:         Mood and Affect: Mood normal          Invasive Devices  Report    Peripheral Intravenous Line  Duration           Peripheral IV 07/11/22 Right Antecubital 1 day                Current Facility-Administered Medications:     acetaminophen (TYLENOL) tablet 650 mg, 650 mg, Oral, Q6H PRN, Emmanuelle MEENU Dodge-CONCHITA    ciprofloxacin (CIPRO) IVPB (premix in 5% dextrose) 400 mg 200 mL, 400 mg, Intravenous, Q12H, [place], Last Rate: 200 mL/hr at 07/12/22 2113, 400 mg at 07/12/22 2113    DULoxetine (CYMBALTA) delayed release capsule 60 mg, 60 mg, Oral, Daily, Emmanuelle MEENU Dodge-C, 60 mg at 07/12/22 0931    methylPREDNISolone sodium succinate (Solu-MEDROL) injection 20 mg, 20 mg, Intravenous, Q8H [facility], [place] Schulz, 20 mg at 07/13/22 0146    metroNIDAZOLE (FLAGYL) IVPB (premix) 500 mg 100 mL, 500 mg, Intravenous, Q8H, [place] Schulz, Stopped at 07/13/22 0242    morphine injection 2 mg, 2 mg, Intravenous, Q3H PRN, Darlin Revankar, DO    morphine injection 4 mg, 4 mg, Intravenous, Q3H PRN, Darlin Revankar, DO, 4 mg at 07/13/22 0506    multi-electrolyte (PLASMALYTE-A/ISOLYTE-S PH 7 4) IV solution, 100 mL/hr, Intravenous, Continuous, Emmanuelle Echo, PA-C, Last Rate: 100 mL/hr at 07/13/22 0243, 100 mL/hr at 07/13/22 0243    ondansetron (ZOFRAN) injection 4 mg, 4 mg, Intravenous, Q6H PRN, Garrett Ghosh PA-C, 4 mg at 07/12/22 2159    pantoprazole (PROTONIX) EC tablet 20 mg, 20 mg, Oral, Daily, Emmanuelle Echo, PA-C, 20 mg at 07/13/22 0506    sulfaSALAzine (AZULFIDINE) tablet 1,000 mg, 1,000 mg, Oral, BID, Emmanuelle Vecdamariso, PA-C, 1,000 mg at 07/12/22 1850    Lab Results:   Recent Results (from the past 24 hour(s))   CBC    Collection Time: 07/13/22  5:17 AM   Result Value Ref Range    WBC 13 08 (H) 4 31 - 10 16 Thousand/uL    RBC 5 89 (H) 3 88 - 5 62 Million/uL    Hemoglobin 11 1 (L) 12 0 - 17 0 g/dL    Hematocrit 37 0 36 5 - 49 3 %    MCV 63 (L) 82 - 98 fL    MCH 18 8 (L) 26 8 - 34 3 pg    MCHC 30 0 (L) 31 4 - 37 4 g/dL    RDW 18 0 (H) 11 6 - 15 1 %    Platelets 688 (H) 089 - 390 Thousands/uL    MPV 8 9 8 9 - 12 7 fL   Comprehensive metabolic panel    Collection Time: 07/13/22  5:17 AM   Result Value Ref Range    Sodium 137 136 - 145 mmol/L    Potassium 4 0 3 5 - 5 3 mmol/L    Chloride 101 100 - 108 mmol/L    CO2 26 21 - 32 mmol/L    ANION GAP 10 4 - 13 mmol/L    BUN 14 5 - 25 mg/dL    Creatinine 0 99 0 60 - 1 30 mg/dL    Glucose 103 65 - 140 mg/dL    Glucose, Fasting 103 (H) 65 - 99 mg/dL    Calcium 8 6 8 3 - 10 1 mg/dL    AST 13 5 - 45 U/L    ALT 23 12 - 78 U/L    Alkaline Phosphatase 73 46 - 116 U/L    Total Protein 7 2 6 4 - 8 2 g/dL    Albumin 3 6 3 5 - 5 0 g/dL    Total Bilirubin 0 72 0 20 - 1 00 mg/dL    eGFR 102 ml/min/1 73sq m             Imaging Studies: CT abdomen pelvis wo contrast    Result Date: 7/12/2022  Narrative: CT ABDOMEN AND PELVIS WITHOUT IV CONTRAST INDICATION:   r/o obstruction   COMPARISON: July 6, 2022 TECHNIQUE:  CT examination of the abdomen and pelvis was performed without intravenous contrast  This examination was performed without intravenous contrast in the context of the critical nationwide Omnipaque shortage  Axial, sagittal, and coronal 2D reformatted images were created from the source data and submitted for interpretation  Radiation dose length product (DLP) for this visit:  461 61 mGy-cm   This examination, like all CT scans performed in the [facility], was performed utilizing techniques to minimize radiation dose exposure, including the use of iterative  reconstruction and automated exposure control  Enteric contrast was administered  FINDINGS: ABDOMEN LOWER CHEST:  No clinically significant abnormality identified in the visualized lower chest  LIVER/BILIARY TREE:  Unremarkable  GALLBLADDER:  No calcified gallstones  No pericholecystic inflammatory change  SPLEEN:  Unremarkable  PANCREAS:  Unremarkable  ADRENAL GLANDS:  Unremarkable  KIDNEYS/URETERS:  Unremarkable  No hydronephrosis  STOMACH AND BOWEL:  Postsurgical changes of colectomy with ileal pouch and ileoanal anastomosis  There is marked distention of the ileoanal anastomosis  Contrast is seen up to the anastomotic sutures at the ileum significant stranding around the ileoanal anastomosis is seen  APPENDIX:  No findings to suggest appendicitis  ABDOMINOPELVIC CAVITY:  Stable enlarged right pelvic lymph nodes  VESSELS:  Unremarkable for patient's age  PELVIS REPRODUCTIVE ORGANS:  Unremarkable for patient's age  URINARY BLADDER:  Unremarkable  ABDOMINAL WALL/INGUINAL REGIONS:  Unremarkable  OSSEOUS STRUCTURES:  No acute fracture or destructive osseous lesion  Impression: Markedly distended ileoanal anastomosis with surrounding inflammatory changes  Findings may represent infection versus inflammatory cannot rule out ischemia)  Follow-up is recommended  The study was marked in [facility] for immediate notification  Workstation performed: [id number]     CT abdomen pelvis wo contrast    Result Date: 7/6/2022  Narrative: CT ABDOMEN AND PELVIS WITHOUT IV CONTRAST INDICATION:   Abdominal pain, acute, nonlocalized abdominal pain  COMPARISON:  6/5/2022  TECHNIQUE:  CT examination of the abdomen and pelvis was performed without intravenous contrast  This examination was performed without intravenous contrast in the context of the critical nationwide Omnipaque shortage  Axial, sagittal, and coronal 2D reformatted images were created from the source data and submitted for interpretation  Radiation dose length product (DLP) for this visit:  471 21 mGy-cm   This examination, like all CT scans performed in the [facility], was performed utilizing techniques to minimize radiation dose exposure, including the use of iterative  reconstruction and automated exposure control  Enteric contrast was not administered  FINDINGS: ABDOMEN LOWER CHEST:  No clinically significant abnormality identified in the visualized lower chest  LIVER/BILIARY TREE:  Unremarkable  GALLBLADDER:  No calcified gallstones  No pericholecystic inflammatory change  SPLEEN:  Unremarkable  PANCREAS:  Unremarkable  ADRENAL GLANDS:  Unremarkable  KIDNEYS/URETERS:  Unremarkable  No hydronephrosis  STOMACH AND BOWEL:  Redemonstrated postsurgical changes of colectomy with ileal pouch and ileoanal anastomosis  There are segmentally dilated loops of small bowel in the upper hemiabdomen measuring up to 7 cm in maximal transverse dimension with air-fluid levels  Multiple collapsed loops of small bowel are seen in the midabdomen  There is increased distention of the ileal pouch with air and fecal debris to the level of the ileoanal anastomosis compared to prior recent examinations  APPENDIX:  Surgically absent  ABDOMINOPELVIC CAVITY:  No ascites  No pneumoperitoneum  Stable enlarged right pelvic lymph node  No new lymphadenopathy   VESSELS:  Unremarkable for patient's age  PELVIS REPRODUCTIVE ORGANS:  Unremarkable for patient's age  URINARY BLADDER:  Unremarkable  ABDOMINAL WALL/INGUINAL REGIONS:  Unremarkable  OSSEOUS STRUCTURES:  No acute fracture or destructive osseous lesion  Impression: Abnormally dilated small bowel loops in the upper hemiabdomen measuring up to 7 cm and with air-fluid levels concerning for small bowel obstruction  ?  Transition point at the right upper quadrant surgical anastomosis  No free air  Surgical consult advised  Above findings discussed with Dr Forest Hicks at 4:45 AM on 7/6/2022  Workstation performed: [id number]           [place]      Please Note: "This note has been constructed using a voice recognition system  Therefore there may be syntax, spelling, and/or grammatical errors   Please call if you have any questions  "**

## 2022-07-14 ENCOUNTER — PATIENT OUTREACH (OUTPATIENT)
Dept: FAMILY MEDICINE CLINIC | Facility: CLINIC | Age: 27
End: 2022-07-14

## 2022-07-14 VITALS
HEART RATE: 75 BPM | TEMPERATURE: 97.7 F | BODY MASS INDEX: 26.66 KG/M2 | HEIGHT: 69 IN | DIASTOLIC BLOOD PRESSURE: 63 MMHG | SYSTOLIC BLOOD PRESSURE: 117 MMHG | RESPIRATION RATE: 17 BRPM | OXYGEN SATURATION: 91 % | WEIGHT: 180 LBS

## 2022-07-14 PROCEDURE — 99232 SBSQ HOSP IP/OBS MODERATE 35: CPT | Performed by: INTERNAL MEDICINE

## 2022-07-14 PROCEDURE — 99239 HOSP IP/OBS DSCHRG MGMT >30: CPT | Performed by: FAMILY MEDICINE

## 2022-07-14 RX ORDER — PREDNISONE 10 MG/1
TABLET ORAL
Qty: 147 TABLET | Refills: 0 | Status: SHIPPED | OUTPATIENT
Start: 2022-07-14 | End: 2022-08-27

## 2022-07-14 RX ORDER — METRONIDAZOLE 500 MG/1
500 TABLET ORAL EVERY 8 HOURS SCHEDULED
Qty: 90 TABLET | Refills: 0 | Status: SHIPPED | OUTPATIENT
Start: 2022-07-14 | End: 2022-08-13

## 2022-07-14 RX ADMIN — MORPHINE SULFATE 4 MG: 4 INJECTION INTRAVENOUS at 05:40

## 2022-07-14 RX ADMIN — CIPROFLOXACIN 400 MG: 2 INJECTION, SOLUTION INTRAVENOUS at 08:31

## 2022-07-14 RX ADMIN — DULOXETINE HYDROCHLORIDE 60 MG: 60 CAPSULE, DELAYED RELEASE ORAL at 08:30

## 2022-07-14 RX ADMIN — MORPHINE SULFATE 4 MG: 4 INJECTION INTRAVENOUS at 16:30

## 2022-07-14 RX ADMIN — SODIUM CHLORIDE, SODIUM GLUCONATE, SODIUM ACETATE, POTASSIUM CHLORIDE, MAGNESIUM CHLORIDE, SODIUM PHOSPHATE, DIBASIC, AND POTASSIUM PHOSPHATE 100 ML/HR: .53; .5; .37; .037; .03; .012; .00082 INJECTION, SOLUTION INTRAVENOUS at 05:06

## 2022-07-14 RX ADMIN — ONDANSETRON 4 MG: 2 INJECTION INTRAMUSCULAR; INTRAVENOUS at 13:01

## 2022-07-14 RX ADMIN — SULFASALAZINE 1000 MG: 500 TABLET ORAL at 08:30

## 2022-07-14 RX ADMIN — MORPHINE SULFATE 4 MG: 4 INJECTION INTRAVENOUS at 02:35

## 2022-07-14 RX ADMIN — METRONIDAZOLE 500 MG: 500 INJECTION, SOLUTION INTRAVENOUS at 08:31

## 2022-07-14 RX ADMIN — METRONIDAZOLE 500 MG: 500 INJECTION, SOLUTION INTRAVENOUS at 02:26

## 2022-07-14 RX ADMIN — MORPHINE SULFATE 4 MG: 4 INJECTION INTRAVENOUS at 08:31

## 2022-07-14 RX ADMIN — METHYLPREDNISOLONE SODIUM SUCCINATE 20 MG: 40 INJECTION, POWDER, FOR SOLUTION INTRAMUSCULAR; INTRAVENOUS at 02:25

## 2022-07-14 RX ADMIN — METHYLPREDNISOLONE SODIUM SUCCINATE 20 MG: 40 INJECTION, POWDER, FOR SOLUTION INTRAMUSCULAR; INTRAVENOUS at 09:48

## 2022-07-14 RX ADMIN — MORPHINE SULFATE 4 MG: 4 INJECTION INTRAVENOUS at 13:00

## 2022-07-14 RX ADMIN — PANTOPRAZOLE SODIUM 20 MG: 20 TABLET, DELAYED RELEASE ORAL at 05:43

## 2022-07-14 NOTE — PLAN OF CARE
Problem: PAIN - ADULT  Goal: Verbalizes/displays adequate comfort level or baseline comfort level  Description: Interventions:  - Encourage patient to monitor pain and request assistance  - Assess pain using appropriate pain scale  - Administer analgesics based on type and severity of pain and evaluate response  - Implement non-pharmacological measures as appropriate and evaluate response  - Consider cultural and social influences on pain and pain management  - Notify physician/advanced practitioner if interventions unsuccessful or patient reports new pain  Outcome: Progressing     Problem: INFECTION - ADULT  Goal: Absence of fever/infection during neutropenic period  Description: INTERVENTIONS:  - Monitor WBC    Outcome: Progressing  Goal: Absence or prevention of progression during hospitalization  Description: INTERVENTIONS:  - Assess and monitor for signs and symptoms of infection  - Monitor lab/diagnostic results  - Monitor all insertion sites, i e  indwelling lines, tubes, and drains  - Monitor endotracheal if appropriate and nasal secretions for changes in amount and color  - [place] appropriate cooling/warming therapies per order  - Administer medications as ordered  - Instruct and encourage patient and family to use good hand hygiene technique  - Identify and instruct in appropriate isolation precautions for identified infection/condition  Outcome: Progressing     Problem: SAFETY ADULT  Goal: Patient will remain free of falls  Description: INTERVENTIONS:  - Educate patient/family on patient safety including physical limitations  - Instruct patient to call for assistance with activity   - Consult OT/PT to assist with strengthening/mobility   - Keep Call bell within reach  - Keep bed low and locked with side rails adjusted as appropriate  - Keep care items and personal belongings within reach  - Initiate and maintain comfort rounds  - Make Fall Risk Sign visible to staff  - Offer Toileting every 2 Hours, in advance of need  - Initiate/Maintain bed alarm  - Obtain necessary fall risk management equipment:   - Apply yellow socks and bracelet for high fall risk patients  - Consider moving patient to room near nurses station  Outcome: Progressing  Goal: Maintain or return to baseline ADL function  Description: INTERVENTIONS:  -  Assess patient's ability to carry out ADLs; assess patient's baseline for ADL function and identify physical deficits which impact ability to perform ADLs (bathing, care of mouth/teeth, toileting, grooming, dressing, etc )  - Assess/evaluate cause of self-care deficits   - Assess range of motion  - Assess patient's mobility; develop plan if impaired  - Assess patient's need for assistive devices and provide as appropriate  - Encourage maximum independence but intervene and supervise when necessary  - Involve family in performance of ADLs  - Assess for home care needs following discharge   - Consider OT consult to assist with ADL evaluation and planning for discharge  - Provide patient education as appropriate  Outcome: Progressing     Problem: DISCHARGE PLANNING  Goal: Discharge to home or other facility with appropriate resources  Description: INTERVENTIONS:  - Identify barriers to discharge w/patient and caregiver  - Arrange for needed discharge resources and transportation as appropriate  - Identify discharge learning needs (meds, wound care, etc )  - Arrange for interpretive services to assist at discharge as needed  - Refer to Case Management Department for coordinating discharge planning if the patient needs post-hospital services based on physician/advanced practitioner order or complex needs related to functional status, cognitive ability, or social support system  Outcome: Progressing     Problem: Knowledge Deficit  Goal: Patient/family/caregiver demonstrates understanding of disease process, treatment plan, medications, and discharge instructions  Description: Complete learning assessment and assess knowledge base    Interventions:  - Provide teaching at level of understanding  - Provide teaching via preferred learning methods  Outcome: Progressing

## 2022-07-14 NOTE — PROGRESS NOTES
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   Patient follows with surgeon in the [place] who is considering revision per patient   CT abdomen showed markedly distended ileal anal anastomosis with surrounding inflammatory changes   Findings may represent infection verses inflammatory cannot rule out ischemia   Positive leukocytosis, white blood count 10  54   Abdominal pain may be secondary to inflammatory bowel disease, infectious process, or ischemia  Patient reports pain is better today  No abdominal tenderness on examination  Patient is tolerating clear liquid diet  Patient reports he is hungry  No nausea or vomiting  No increase in bowel movements   -Continue Cipro 400 mg IV Q 12 hours  -Continue Flagyl 500 mg IV Q 8 hours  -Continue Solu-Medrol 20 mg IV Q 8 hours  -Pain management per attending  -Zofran as needed for nausea or vomiting  -Low residue low-fiber   -Continue supportive care and IV fluid hydration   -If no improvement in symptoms patient may need further evaluation with pouchoscopy or MR enterography    -Avoid NSAIDs     Subjective:   Patient reports pain has improved  Tolerating clear liquid diet  Patient reports he is hungry  Patient denies nausea, vomiting, acid reflux, or heartburn  No increase in bowel movements from patient's normal   Denies blood in stool, blood from rectal area, or black tarry stool  Objective:     Vitals: Blood pressure 121/76, pulse 71, temperature 97 9 °F (36 6 °C), temperature source Oral, resp  rate 17, height 5' 9" (1 753 m), weight 81 6 kg (180 lb), SpO2 93 %  ,Body mass index is 26 58 kg/m²        Intake/Output Summary (Last 24 hours) at 7/14/2022 8142  Last data filed at 7/14/2022 0330  Gross per 24 hour   Intake 300 ml   Output 2150 ml   Net -1850 ml       Physical Exam: Physical Exam  Vitals reviewed  Constitutional:       General: He is not in acute distress  Cardiovascular:      Rate and Rhythm: Normal rate and regular rhythm  Pulses: Normal pulses  Heart sounds: Normal heart sounds  Pulmonary:      Effort: Pulmonary effort is normal  No respiratory distress  Breath sounds: Normal breath sounds  No stridor  No wheezing, rhonchi or rales  Abdominal:      General: Bowel sounds are normal  There is no distension  Palpations: Abdomen is soft  There is no mass  Tenderness: There is no abdominal tenderness  There is no guarding or rebound  Hernia: No hernia is present  Skin:     General: Skin is warm and dry  Capillary Refill: Capillary refill takes less than 2 seconds  Coloration: Skin is not jaundiced or pale  Neurological:      Mental Status: He is alert and oriented to person, place, and time     Psychiatric:         Mood and Affect: Mood normal          Invasive Devices  Report    Peripheral Intravenous Line  Duration           Peripheral IV 07/11/22 Right Antecubital 2 days                Current Facility-Administered Medications:     acetaminophen (TYLENOL) tablet 650 mg, 650 mg, Oral, Q6H PRN, Emmanuelle Dodge PA-C    ciprofloxacin (CIPRO) IVPB (premix in 5% dextrose) 400 mg 200 mL, 400 mg, Intravenous, Q12H, [place] Schulz, Last Rate: 200 mL/hr at 07/14/22 0831, 400 mg at 07/14/22 0831    DULoxetine (CYMBALTA) delayed release capsule 60 mg, 60 mg, Oral, Daily, Emmanuelle Dodge PA-C, 60 mg at 07/14/22 0830    methylPREDNISolone sodium succinate (Solu-MEDROL) injection 20 mg, 20 mg, Intravenous, Q8H [place], [place] Schulz, 20 mg at 07/14/22 0948    metroNIDAZOLE (FLAGYL) IVPB (premix) 500 mg 100 mL, 500 mg, Intravenous, Q8H, [place] Schulz, Last Rate: 200 mL/hr at 07/14/22 0831, 500 mg at 07/14/22 0831   morphine injection 2 mg, 2 mg, Intravenous, Q3H PRN, Darlin Revankar, DO    morphine injection 4 mg, 4 mg, Intravenous, Q3H PRN, Darlin Revankar, DO, 4 mg at 07/14/22 0831    multi-electrolyte (PLASMALYTE-A/ISOLYTE-S PH 7 4) IV solution, 100 mL/hr, Intravenous, Continuous, Emmanuelle Vecellio, PA-C, Last Rate: 100 mL/hr at 07/14/22 0506, 100 mL/hr at 07/14/22 0506    ondansetron (ZOFRAN) injection 4 mg, 4 mg, Intravenous, Q6H PRN, Emmanuelle Vecellio, PA-C, 4 mg at 07/13/22 2301    pantoprazole (PROTONIX) EC tablet 20 mg, 20 mg, Oral, Daily, Emmanuelle Vecellio, PA-C, 20 mg at 07/14/22 0543    sulfaSALAzine (AZULFIDINE) tablet 1,000 mg, 1,000 mg, Oral, BID, Emmanuelle Vecellio, PA-C, 1,000 mg at 07/14/22 0830    Lab Results: No results found for this or any previous visit (from the past 24 hour(s))  Imaging Studies: CT abdomen pelvis wo contrast    Result Date: 7/12/2022  Narrative: CT ABDOMEN AND PELVIS WITHOUT IV CONTRAST INDICATION:   r/o obstruction  COMPARISON:  July 6, 2022 TECHNIQUE:  CT examination of the abdomen and pelvis was performed without intravenous contrast  This examination was performed without intravenous contrast in the context of the critical nationwide Omnipaque shortage  Axial, sagittal, and coronal 2D reformatted images were created from the source data and submitted for interpretation  Radiation dose length product (DLP) for this visit:  461 61 mGy-cm   This examination, like all CT scans performed in the [facility], was performed utilizing techniques to minimize radiation dose exposure, including the use of iterative  reconstruction and automated exposure control  Enteric contrast was administered  FINDINGS: ABDOMEN LOWER CHEST:  No clinically significant abnormality identified in the visualized lower chest  LIVER/BILIARY TREE:  Unremarkable  GALLBLADDER:  No calcified gallstones  No pericholecystic inflammatory change  SPLEEN:  Unremarkable  PANCREAS:  Unremarkable  ADRENAL GLANDS:  Unremarkable  KIDNEYS/URETERS:  Unremarkable  No hydronephrosis  STOMACH AND BOWEL:  Postsurgical changes of colectomy with ileal pouch and ileoanal anastomosis  There is marked distention of the ileoanal anastomosis  Contrast is seen up to the anastomotic sutures at the ileum significant stranding around the ileoanal anastomosis is seen  APPENDIX:  No findings to suggest appendicitis  ABDOMINOPELVIC CAVITY:  Stable enlarged right pelvic lymph nodes  VESSELS:  Unremarkable for patient's age  PELVIS REPRODUCTIVE ORGANS:  Unremarkable for patient's age  URINARY BLADDER:  Unremarkable  ABDOMINAL WALL/INGUINAL REGIONS:  Unremarkable  OSSEOUS STRUCTURES:  No acute fracture or destructive osseous lesion  Impression: Markedly distended ileoanal anastomosis with surrounding inflammatory changes  Findings may represent infection versus inflammatory cannot rule out ischemia)  Follow-up is recommended  The study was marked in [facility] for immediate notification  Workstation performed: [id number]     CT abdomen pelvis wo contrast    Result Date: 7/6/2022  Narrative: CT ABDOMEN AND PELVIS WITHOUT IV CONTRAST INDICATION:   Abdominal pain, acute, nonlocalized abdominal pain  COMPARISON:  6/5/2022  TECHNIQUE:  CT examination of the abdomen and pelvis was performed without intravenous contrast  This examination was performed without intravenous contrast in the context of the critical nationwide Omnipaque shortage  Axial, sagittal, and coronal 2D reformatted images were created from the source data and submitted for interpretation  Radiation dose length product (DLP) for this visit:  471 21 mGy-cm   This examination, like all CT scans performed in the [facility], was performed utilizing techniques to minimize radiation dose exposure, including the use of iterative  reconstruction and automated exposure control  Enteric contrast was not administered   FINDINGS: ABDOMEN LOWER CHEST:  No clinically significant abnormality identified in the visualized lower chest  LIVER/BILIARY TREE:  Unremarkable  GALLBLADDER:  No calcified gallstones  No pericholecystic inflammatory change  SPLEEN:  Unremarkable  PANCREAS:  Unremarkable  ADRENAL GLANDS:  Unremarkable  KIDNEYS/URETERS:  Unremarkable  No hydronephrosis  STOMACH AND BOWEL:  Redemonstrated postsurgical changes of colectomy with ileal pouch and ileoanal anastomosis  There are segmentally dilated loops of small bowel in the upper hemiabdomen measuring up to 7 cm in maximal transverse dimension with air-fluid levels  Multiple collapsed loops of small bowel are seen in the midabdomen  There is increased distention of the ileal pouch with air and fecal debris to the level of the ileoanal anastomosis compared to prior recent examinations  APPENDIX:  Surgically absent  ABDOMINOPELVIC CAVITY:  No ascites  No pneumoperitoneum  Stable enlarged right pelvic lymph node  No new lymphadenopathy  VESSELS:  Unremarkable for patient's age  PELVIS REPRODUCTIVE ORGANS:  Unremarkable for patient's age  URINARY BLADDER:  Unremarkable  ABDOMINAL WALL/INGUINAL REGIONS:  Unremarkable  OSSEOUS STRUCTURES:  No acute fracture or destructive osseous lesion  Impression: Abnormally dilated small bowel loops in the upper hemiabdomen measuring up to 7 cm and with air-fluid levels concerning for small bowel obstruction  ?  Transition point at the right upper quadrant surgical anastomosis  No free air  Surgical consult advised  Above findings discussed with Dr Charles Lisa at 4:45 AM on 7/6/2022  Workstation performed: [id number]           Fuad Nose, CRNP      Please Note: "This note has been constructed using a voice recognition system  Therefore there may be syntax, spelling, and/or grammatical errors   Please call if you have any questions  "**

## 2022-07-14 NOTE — ED PROVIDER NOTES
Final Diagnosis:  1  Abdominal pain        Chief Complaint   Patient presents with    Abdominal Pain     Pt reporting intense abdominal pain when eating, reports pain worsening since this afternoon, hx of SBO, as well as extensive history of abdominal issues  HPI  Patient seen earlier today  Didn't fill his oral MSIR  Too much pain so he returns  Similar pain  Lower abd cramping with nausea  Still stooling  No blood, not straight liquid  Vomiting NBNB  Still tolerting clears at home  Have patinet drinking contrast tolerating  Soft abd while conversing  Voluntary guarding  No CVA tenderness      - No language barrier    - History obtained from patient  - There are no limitations to the history obtained  - Previous charting underwent limited review with attention to last ED visits, labs, ekgs, and prior imaging  PMH:   has a past medical history of Ankylosing spondylitis ([facility] [address] ), Anxiety, Bowel obstruction ([facility] [address] ), Clostridium difficile colitis (9/13/2018), Colitis, 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[facility] [address] ) (9/13/2018), Pancreatitis, and Ulcerative colitis ([facility] [address] )  PSH:   has a past surgical history that includes Total colectomy; COLECTOMY TOTAL; and IR PICC placement single lumen (3/1/2022)  Social History:    Tobacco Use: Low Risk     Smoking Tobacco Use: Never Smoker    Smokeless Tobacco Use: Never Used     Alcohol Use: Not on file     Patient with no illicit use    ROS:    Pertinent positives/negatives:   Review of Systems   Gastrointestinal: Positive for abdominal pain, nausea and vomiting  CONSTITUTIONAL:  No dizziness  No weakness  No unexpected weight loss  EYES:  No pain, erythema, or discharge  No loss of vision  ENT:  No tinnitus, decreased hearing  No epistaxis/purulent drainage  No voice change, airway closing, trismus  CARDIOVASCULAR:  No chest pain  No palpitations  No new lower extremity edema  RESPIRATORY:  No purulent cough  No hemoptysis  No dyspnea  No paroxysmal nocturnal dyspnea   No stridor, audible wheezing bedside  GASTROINTESTINAL:  Normal appetite  No vomiting, diarrhea  No pain  No bloating  No melena  GENITOURINARY:  No frequency, urgency, nocturia  No hematuria or dysuria  No discharge  No sores/adenopathy  MUSCULOSKELETAL:  No arthralgias or myalgias that are new  INTEGUMENTARY:  No swelling  No unexpected contusions  No abrasions  No lymphangitis  NEUROLOGIC:  No meningismus  No numbness of the extremities  No new focal weakness  No postural instability  PSYCHIATRIC:  No SI HI AVH  HEMATOLOGICAL:  No bleeding  No petechiae  No bruising  ALLERGIES:  No urticaria  No sudden abd cramping  No stridor  PE:     Physical exam highlights:   Physical Exam       Vitals:    07/12/22 2255 07/13/22 0213 07/13/22 0811 07/13/22 1921   BP: 125/74  122/67 123/73   BP Location:    Left arm   Pulse: 73 74 64 68   Resp: 18  18 20   Temp: 98 °F (36 7 °C) 97 8 °F (36 6 °C) 97 6 °F (36 4 °C) 97 9 °F (36 6 °C)   TempSrc:    Oral   SpO2: 91% 92% 96% 91%   Weight:       Height:         Vitals reviewed by me  Nursing note reviewed  Chaperone present for all sensitive exam   Const: No acute distress  Alert  Nontoxic  Not diaphoretic  HEENT: External ears normal  No protrusion drainage swelling  Nose normal  No drainage/traumatic deformity  MMM  Mouth with baseline/symmetric movement  No trismus  Eyes: No squinting  No icterus  Tracks through the room with normal EOM  No tearing/swelling/drainage  Neck: ROM normal  No rigidity  No meningismus  Cards: Rate as per vitals  Compared to monitor sinus unless documented above  Regular  Well perfused  Pulm: able to verbalize without additional effort  Effort and excursion normal  No disress  No audible wheezing/ stridor  Normal resp rate  Abd: No distension beyond baseline  No fluctuant wave  Patient without peritoneal pain with shifting/bumping the bed  MSK: ROM normal and baseline  No deformity  Skin: No new rashes visible  Well perfused  Neuro: Nonfocal  Baseline  CN grossly intact  Moving all four with coordination  Psych: Normal behavior and affect  A:  - Nursing note reviewed  Ddx and MDM  IBD  SBO                          CT abdomen pelvis wo contrast   Final Result      Markedly distended ileoanal anastomosis with surrounding inflammatory changes  Findings may represent infection versus inflammatory cannot rule out ischemia)  Follow-up is recommended  The study was marked in [facility] for immediate notification  Workstation performed: [id number]           Orders Placed This Encounter   Procedures    COVID only    CT abdomen pelvis wo contrast    CBC and differential    Comprehensive metabolic panel    Magnesium    Lactic acid    UA w Reflex to Microscopic w Reflex to Culture    Basic metabolic panel    CBC and differential    CBC    Comprehensive metabolic panel    Diet Clear Liquid    Insert peripheral IV    Nursing communication Continue IV as ordered      Notify admitting physician    Notify admitting physician on arrival    Vital Signs per unit routine    Up and OOB as tolerated    Ambulate patient    Level 1-Full Code: all life saving measures are indicated    Inpatient consult to gastroenterology    Room Service    Place in Observation    Inpatient Admission     Labs Reviewed   CBC AND DIFFERENTIAL - Abnormal       Result Value Ref Range Status    WBC 10 54 (*) 4 31 - 10 16 Thousand/uL Final    RBC 6 58 (*) 3 88 - 5 62 Million/uL Final    Hemoglobin 12 4  12 0 - 17 0 g/dL Final    Hematocrit 42 2  36 5 - 49 3 % Final    MCV 64 (*) 82 - 98 fL Final    MCH 18 8 (*) 26 8 - 34 3 pg Final    MCHC 29 4 (*) 31 4 - 37 4 g/dL Final    RDW 19 0 (*) 11 6 - 15 1 % Final    MPV 9 1  8 9 - 12 7 fL Final    Platelets 094 (*) 073 - 390 Thousands/uL Final    nRBC 0  /100 WBCs Final    Neutrophils Relative 76 (*) 43 - 75 % Final    Immat GRANS % 1  0 - 2 % Final    Lymphocytes Relative 12 (*) 14 - 44 % Final Monocytes Relative 9  4 - 12 % Final    Eosinophils Relative 1  0 - 6 % Final    Basophils Relative 1  0 - 1 % Final    Neutrophils Absolute 8 08 (*) 1 85 - 7 62 Thousands/µL Final    Immature Grans Absolute 0 05  0 00 - 0 20 Thousand/uL Final    Lymphocytes Absolute 1 24  0 60 - 4 47 Thousands/µL Final    Monocytes Absolute 0 99  0 17 - 1 22 Thousand/µL Final    Eosinophils Absolute 0 10  0 00 - 0 61 Thousand/µL Final    Basophils Absolute 0 08  0 00 - 0 10 Thousands/µL Final   COMPREHENSIVE METABOLIC PANEL - Abnormal    Sodium 140  136 - 145 mmol/L Final    Potassium 4 5  3 5 - 5 3 mmol/L Final    Comment: Verified by repeat analysis Slightly Hemolyzed; Results May be Affected    Chloride 102  100 - 108 mmol/L Final    CO2 30  21 - 32 mmol/L Final    ANION GAP 8  4 - 13 mmol/L Final    BUN 13  5 - 25 mg/dL Final    Creatinine 1 27  0 60 - 1 30 mg/dL Final    Comment: Standardized to IDMS reference method    Glucose 84  65 - 140 mg/dL Final    Comment: If the patient is fasting, the ADA then defines impaired fasting glucose as > 100 mg/dL and diabetes as > or equal to 123 mg/dL  Specimen collection should occur prior to Sulfasalazine administration due to the potential for falsely depressed results  Specimen collection should occur prior to Sulfapyridine administration due to the potential for falsely elevated results  Calcium 9 1  8 3 - 10 1 mg/dL Final    AST 46 (*) 5 - 45 U/L Final    Comment: Slightly Hemolyzed; Results May be Affected  Specimen collection should occur prior to Sulfasalazine administration due to the potential for falsely depressed results  ALT 32  12 - 78 U/L Final    Comment: Specimen collection should occur prior to Sulfasalazine administration due to the potential for falsely depressed results       Alkaline Phosphatase 83  46 - 116 U/L Final    Total Protein 7 7  6 4 - 8 2 g/dL Final    Albumin 3 8  3 5 - 5 0 g/dL Final    Total Bilirubin 0 85  0 20 - 1 00 mg/dL Final    Comment: Verified by repeat analysis  Use of this assay is not recommended for patients undergoing treatment with eltrombopag due to the potential for falsely elevated results  eGFR 75  ml/min/1 73sq m Final    Narrative:     [place] guidelines for Chronic Kidney Disease (CKD):     Stage 1 with normal or high GFR (GFR > 90 mL/min/1 73 square meters)    Stage 2 Mild CKD (GFR = 60-89 mL/min/1 73 square meters)    Stage 3A Moderate CKD (GFR = 45-59 mL/min/1 73 square meters)    Stage 3B Moderate CKD (GFR = 30-44 mL/min/1 73 square meters)    Stage 4 Severe CKD (GFR = 15-29 mL/min/1 73 square meters)    Stage 5 End Stage CKD (GFR <15 mL/min/1 73 square meters)  Note: GFR calculation is accurate only with a steady state creatinine   NOVEL CORONAVIRUS (COVID-19), PCR [facility] - Normal    SARS-CoV-2 Negative  Negative Final    Narrative:     FOR PEDIATRIC PATIENTS - copy/paste COVID Guidelines URL to browser: [email or web address]    SARS-CoV-2 assay is a Nucleic Acid Amplification assay intended for the  qualitative detection of nucleic acid from SARS-CoV-2 in nasopharyngeal  swabs  Results are for the presumptive identification of SARS-CoV-2 RNA  Positive results are indicative of infection with SARS-CoV-2, the virus  causing COVID-19, but do not rule out bacterial infection or co-infection  with other viruses  Laboratories within the [place] and its  territories are required to report all positive results to the appropriate  public health authorities  Negative results do not preclude SARS-CoV-2  infection and should not be used as the sole basis for treatment or other  patient management decisions  Negative results must be combined with  clinical observations, patient history, and epidemiological information  This test has not been FDA cleared or approved      This test has been authorized by FDA under an Emergency Use Authorization  (EUA)  This test is only authorized for the duration of time the  declaration that circumstances exist justifying the authorization of the  emergency use of an in vitro diagnostic tests for detection of SARS-CoV-2  virus and/or diagnosis of COVID-19 infection under section 564(b)(1) of  the Act, 21 U  S C  423MDE-6(B)(7), unless the authorization is terminated  or revoked sooner  The test has been validated but independent review by FDA  and CLIA is pending  Test performed using [employer] GeneXpert: This RT-PCR assay targets N2,  a region unique to SARS-CoV-2  A conserved region in the E-gene was chosen  for pan-Sarbecovirus detection which includes SARS-CoV-2  MAGNESIUM - Normal    Magnesium 2 0  1 6 - 2 6 mg/dL Final   LACTIC ACID, PLASMA - Normal    LACTIC ACID 0 7  0 5 - 2 0 mmol/L Final    Narrative:     Result may be elevated if tourniquet was used during collection  UA W REFLEX TO MICROSCOPIC WITH REFLEX TO CULTURE    Color, UA Yellow   Final    Clarity, UA Slightly Cloudy   Final    Specific [place], UA 1 015  1 000 - 1 030 Final    pH, UA 7 5  5 0, 5 5, 6 0, 6 5, 7 0, 7 5, 8 0, 8 5, 9 0 Final    Leukocytes, UA Negative  Negative Final    Nitrite, UA Negative  Negative Final    Protein, UA Negative  Negative mg/dl Final    Glucose, UA Negative  Negative mg/dl Final    Ketones, UA Negative  Negative mg/dl Final    Urobilinogen, UA 0 2  0 2, 1 0 E U /dl E U /dl Final    Bilirubin, UA Negative  Negative Final    Occult Blood, UA Negative  Negative Final       Final Diagnosis:  1   Abdominal pain        P:  - hospital tx includes   Medications   multi-electrolyte (PLASMALYTE-A/ISOLYTE-S PH 7 4) IV solution (100 mL/hr Intravenous New Bag 7/13/22 1352)   DULoxetine (CYMBALTA) delayed release capsule 60 mg (60 mg Oral Given 7/13/22 0813)   pantoprazole (PROTONIX) EC tablet 20 mg (20 mg Oral Given 7/13/22 3636)   acetaminophen (TYLENOL) tablet 650 mg (has no administration in time range) ondansetron [facility]) injection 4 mg (4 mg Intravenous Given 7/12/22 2159)   metroNIDAZOLE (FLAGYL) IVPB (premix) 500 mg 100 mL (500 mg Intravenous New Bag 7/13/22 1757)   ciprofloxacin (CIPRO) IVPB (premix in 5% dextrose) 400 mg 200 mL (400 mg Intravenous New Bag 7/13/22 2121)   methylPREDNISolone sodium succinate (Solu-MEDROL) injection 20 mg (20 mg Intravenous Given 7/13/22 1756)   morphine injection 4 mg (4 mg Intravenous Given 7/13/22 1929)   morphine injection 2 mg (has no administration in time range)   sulfaSALAzine (AZULFIDINE) tablet 1,000 mg (1,000 mg Oral Given 7/13/22 1755)   ondansetron (ZOFRAN-ODT) dispersible tablet 4 mg (4 mg Oral Given 7/11/22 2147)   sodium chloride 0 9 % bolus 1,000 mL (0 mL Intravenous Stopped 7/12/22 0005)   HYDROmorphone (DILAUDID) injection 1 mg (1 mg Intravenous Given 7/11/22 2211)   metoclopramide (REGLAN) injection 10 mg (10 mg Intravenous Given 7/11/22 2213)   HYDROmorphone (DILAUDID) injection 1 mg (1 mg Intravenous Given 7/11/22 2303)   barium (READI-CAT 2) suspension 900 mL (900 mL Oral Given 7/11/22 2326)   morphine injection 8 mg (8 mg Intravenous Given 7/12/22 0111)   ondansetron (ZOFRAN) injection 4 mg (4 mg Intravenous Given 7/12/22 0110)   ketorolac (TORADOL) injection 15 mg (15 mg Intravenous Given 7/12/22 0354)   Famotidine (PF) (PEPCID) injection 20 mg (20 mg Intravenous Given 7/12/22 0354)   dexamethasone oral liquid 10 mg 1 mL (10 mg Oral Given 7/12/22 0354)         - disposition  Time reflects when diagnosis was documented in both MDM as applicable and the Disposition within this note     Time User Action Codes Description Comment    7/12/2022  3:18 AM Niraj Early [R10 9] Abdominal pain       ED Disposition     ED Disposition   Admit    Condition   Stable    Date/Time   Tue Jul 12, 2022  3:18 AM    Comment   Case was discussed with CALE and the patient's admission status was agreed to be Admission Status: observation status to the service of   [facility]  Follow-up Information    None         - patient will call their PCP to let them know they were in the emergency department  We discuss return precautions       - additional tx intended, if consistent with primary provider:  - patient to follow with :      Current Discharge Medication List      CONTINUE these medications which have NOT CHANGED    Details   DULoxetine (CYMBALTA) 60 mg delayed release capsule Take 1 capsule (60 mg total) by mouth daily  Qty: 90 capsule, Refills: 3    Associated Diagnoses: CAR (generalized anxiety disorder)      ciprofloxacin (CIPRO) 500 mg tablet       hydrOXYzine HCL (ATARAX) 25 mg tablet 1-2 po q6hrs prn anxiety  Qty: 90 tablet, Refills: 1    Associated Diagnoses: CAR (generalized anxiety disorder)      methylPREDNISolone 4 MG tablet therapy pack Use as directed on package  Qty: 21 tablet, Refills: 0    Associated Diagnoses: Ankylosing spondylitis, unspecified site of spine (HCC)      metroNIDAZOLE (FLAGYL) 500 mg tablet       morphine (MSIR) 30 MG tablet Take 1 tablet (30 mg total) by mouth every 6 (six) hours as needed for severe pain for up to 2 days Max Daily Amount: 120 mg  Qty: 8 tablet, Refills: 0    Associated Diagnoses: Abdominal pain      pantoprazole (PROTONIX) 20 mg tablet Take 1 tablet (20 mg total) by mouth daily  Qty: 28 tablet, Refills: 0    Associated Diagnoses: Epigastric abdominal pain      sulfaSALAzine (AZULFIDINE) 500 mg tablet Take 2 tablets (1,000 mg total) by mouth 2 (two) times a day  Refills: 0    Associated Diagnoses: Ankylosing spondylitis ([facility] [address] )           No discharge procedures on file  Prior to Admission Medications   Prescriptions Last Dose Informant Patient Reported? Taking?    DULoxetine (CYMBALTA) 60 mg delayed release capsule 7/11/2022 at Unknown time  No Yes   Sig: Take 1 capsule (60 mg total) by mouth daily   ciprofloxacin (CIPRO) 500 mg tablet Not Taking at Unknown time  Yes No   Patient not taking: Reported on 7/12/2022 hydrOXYzine HCL (ATARAX) 25 mg tablet Not Taking at Unknown time  No No   Si-2 po q6hrs prn anxiety   Patient not taking: Reported on 2022   methylPREDNISolone 4 MG tablet therapy pack Not Taking at Unknown time  No No   Sig: Use as directed on package   Patient not taking: Reported on 2022   metroNIDAZOLE (FLAGYL) 500 mg tablet Not Taking at Unknown time  Yes No   Patient not taking: Reported on 2022   morphine (MSIR) 30 MG tablet Not Taking at Unknown time  No No   Sig: Take 1 tablet (30 mg total) by mouth every 6 (six) hours as needed for severe pain for up to 2 days Max Daily Amount: 120 mg   Patient not taking: Reported on 2022   pantoprazole (PROTONIX) 20 mg tablet Not Taking at Unknown time  No No   Sig: Take 1 tablet (20 mg total) by mouth daily   Patient not taking: Reported on 2022   sulfaSALAzine (AZULFIDINE) 500 mg tablet Not Taking at Unknown time  No No   Sig: Take 2 tablets (1,000 mg total) by mouth 2 (two) times a day   Patient not taking: Reported on 2022      Facility-Administered Medications: None       Portions of the record may have been created with voice recognition software  Occasional wrong word or "sound a like" substitutions may have occurred due to the inherent limitations of voice recognition software  Read the chart carefully and recognize, using context, where substitutions have occurred      Electronically signed by:  MD Erika Chaudhry MD  [id number]

## 2022-07-14 NOTE — DISCHARGE SUMMARY
Discharge Summary - [address] [facility]    Patient Information: Thuy Snyder 32 y o  male MRN: [id number]  Unit/Bed#: [address] [place] [address] Encounter: [id number]    Discharging Physician / Practitioner: Shane Huynh DO  PCP: Taylor Lion DO  Admission Date: 7/11/2022  Discharge Date: 07/14/22    Reason for Admission: Abdominal Pain (Pt reporting intense abdominal pain when eating, reports pain worsening since this afternoon, hx of SBO, as well as extensive history of abdominal issues  )      Discharge Diagnoses:     Principal Problem:    Abdominal pain  Active Problems:    History of ulcerative colitis    CAR (generalized anxiety disorder)    Ankylosing spondylitis ([facility] [address] )  Resolved Problems:    * No resolved hospital problems  *        * Abdominal pain  Assessment & Plan  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  · His surgeon in [place] is considering revision per patient  Patient advised to follow-up with surgeon after discharge  · CT scan showed markedly distended ileoanal anastomosis with surrounding inflammatory changes - infectious versus inflammatory  · Given dilaudid 2mg, morphine 8mg, and toradol in ED without improvement  Also received Protonix, dexamethasone in the ER  · Patient seen by GI and started on Cipro, Flagyl along with 20 mg of IV Solu-Medrol q 8 hours  · Per GI, discharged on Flagyl t i d  For 1 month and prednisone starting at 60 mg which will be decreased by 10 mg every 7 days until he completes it  · Diet was slowly advanced which she is tolerating  Abdominal pain has also improved    History of ulcerative colitis  Assessment & Plan  History of ulcerative colitis with total colectomy and has ileal pouch and end-to-end anastomosis    End anastomosis with stricture in the past needing endoscopy with balloon angioplasty  · Advised to follow-up with surgeon in [place]    Ankylosing spondylitis [facility])  Assessment & Plan  Continue sulfasalazine    CAR (generalized anxiety disorder)  Assessment & Plan  Continue Cymbalta      Consultations During Hospital Stay:  Alice Carreon TO GASTROENTEROLOGY    Procedures Performed:     · none    Significant Findings:     · Refer to hospital course and above listed diagnosis related plan for details    Imaging while in hospital:    CT abdomen pelvis wo contrast    Result Date: 7/12/2022  Narrative: CT ABDOMEN AND PELVIS WITHOUT IV CONTRAST INDICATION:   r/o obstruction  COMPARISON:  July 6, 2022 TECHNIQUE:  CT examination of the abdomen and pelvis was performed without intravenous contrast  This examination was performed without intravenous contrast in the context of the critical nationwide Omnipaque shortage  Axial, sagittal, and coronal 2D reformatted images were created from the source data and submitted for interpretation  Radiation dose length product (DLP) for this visit:  461 61 mGy-cm   This examination, like all CT scans performed in the [facility], was performed utilizing techniques to minimize radiation dose exposure, including the use of iterative  reconstruction and automated exposure control  Enteric contrast was administered  FINDINGS: ABDOMEN LOWER CHEST:  No clinically significant abnormality identified in the visualized lower chest  LIVER/BILIARY TREE:  Unremarkable  GALLBLADDER:  No calcified gallstones  No pericholecystic inflammatory change  SPLEEN:  Unremarkable  PANCREAS:  Unremarkable  ADRENAL GLANDS:  Unremarkable  KIDNEYS/URETERS:  Unremarkable  No hydronephrosis  STOMACH AND BOWEL:  Postsurgical changes of colectomy with ileal pouch and ileoanal anastomosis  There is marked distention of the ileoanal anastomosis  Contrast is seen up to the anastomotic sutures at the ileum significant stranding around the ileoanal anastomosis is seen   APPENDIX:  No findings to suggest appendicitis  ABDOMINOPELVIC CAVITY:  Stable enlarged right pelvic lymph nodes  VESSELS:  Unremarkable for patient's age  PELVIS REPRODUCTIVE ORGANS:  Unremarkable for patient's age  URINARY BLADDER:  Unremarkable  ABDOMINAL WALL/INGUINAL REGIONS:  Unremarkable  OSSEOUS STRUCTURES:  No acute fracture or destructive osseous lesion  Impression: Markedly distended ileoanal anastomosis with surrounding inflammatory changes  Findings may represent infection versus inflammatory cannot rule out ischemia)  Follow-up is recommended  The study was marked in [facility] for immediate notification  Workstation performed: [id number]     Incidental Findings:   · none    Test Results Pending at Discharge (will require follow up):   · As per After Visit Summary     Outpatient Tests Requested:  · none    Complications:  Refer to hospital course and above listed diagnosis related plan, if any    Hospital Course:     Tatiana Jamil is a 32 y o  male patient who originally presented to the hospital on 7/11/2022 due to Severe abdominal pain for 2 days  He was seen in the ER the day prior as sent home with narcotics and pain management referral   Continue to have pain and therefore presented back to the ER  Stated that he was not able to eat anything due to nausea  On imaging patient was noted to of distended ileoanal anastomosis with surrounding inflammatory changes and was seen by GI  Treated with IV steroids and IV antibiotics while here  Tolerating solids by day of discharge  Give by GI prior to discharge  Discharge plan discussed in details with patient    Please see above list of diagnoses and related plan for additional information  Condition at Discharge: stable     Discharge Day Visit / Exam:     Subjective:  States pain is doing much better  Tolerating diet    Wants to go home if possible    Vitals: Blood Pressure: 117/63 (07/14/22 1517)  Pulse: 75 (07/14/22 1517)  Temperature: 97 7 °F (36 5 °C) (07/14/22 1517)  Temp Source: Oral (07/14/22 0817)  Respirations: 17 (07/14/22 0817)  Height: 5' 9" (175 3 cm) (07/12/22 6640)  Weight - Scale: 81 6 kg (180 lb) (07/12/22 0607)  SpO2: 91 % (07/14/22 1517)  Exam:   Physical Exam  Constitutional:       General: He is not in acute distress  Appearance: He is not diaphoretic  HENT:      Head: Normocephalic and atraumatic  Eyes:      General:         Right eye: No discharge  Left eye: No discharge  Cardiovascular:      Rate and Rhythm: Normal rate and regular rhythm  Pulmonary:      Effort: Pulmonary effort is normal  No respiratory distress  Breath sounds: Normal breath sounds  No wheezing or rales  Abdominal:      General: Bowel sounds are normal  There is no distension  Palpations: Abdomen is soft  Tenderness: There is abdominal tenderness (mild lower abdomen)  There is no guarding  Neurological:      Mental Status: He is alert and oriented to person, place, and time  Discharge instructions/Information to patient and family:(Discharge Medications and Follow up):   See after visit summary for information provided to patient and family  Provisions for Follow-Up Care:  See after visit summary for information related to follow-up care and any pertinent home health orders  Disposition: Home    Planned Readmission:  No     Discharge Statement:  I spent > 30 minutes discharging the patient  This time was spent on the day of discharge  I had direct contact with the patient on the day of discharge  Greater than 50% of the total time was spent examining patient, answering all patient questions, arranging and discussing plan of care with patient as well as directly providing post-discharge instructions  Additional time then spent on discharge activities  Discharge Medications:  See after visit summary for reconciled discharge medications provided to patient and family        ** Please Note: "This note has been constructed using a voice recognition system  Therefore there may be syntax, spelling, and/or grammatical errors   Please call if you have any questions  "**

## 2022-07-14 NOTE — PROGRESS NOTES
Chart reviewed  Patient was admitted to IP and follow up review will be scheduled for next week or at [place]  OPCM to follow

## 2022-07-15 ENCOUNTER — TRANSITIONAL CARE MANAGEMENT (OUTPATIENT)
Dept: FAMILY MEDICINE CLINIC | Facility: CLINIC | Age: 27
End: 2022-07-15

## 2022-07-16 ENCOUNTER — TELEPHONE (OUTPATIENT)
Dept: FAMILY MEDICINE CLINIC | Facility: CLINIC | Age: 27
End: 2022-07-16

## 2022-07-16 ENCOUNTER — HOSPITAL ENCOUNTER (EMERGENCY)
Facility: HOSPITAL | Age: 27
Discharge: HOME | End: 2022-07-17
Attending: EMERGENCY MEDICINE | Admitting: EMERGENCY MEDICINE
Payer: COMMERCIAL

## 2022-07-16 DIAGNOSIS — K52.9 IBD (INFLAMMATORY BOWEL DISEASE): ICD-10-CM

## 2022-07-16 DIAGNOSIS — R10.9 ABDOMINAL PAIN: Primary | ICD-10-CM

## 2022-07-16 LAB
ANION GAP SERPL CALCULATED.3IONS-SCNC: 10 MMOL/L (ref 4–13)
BASOPHILS # BLD AUTO: 0.07 THOUSANDS/ΜL (ref 0–0.1)
BASOPHILS NFR BLD AUTO: 1 % (ref 0–1)
BUN SERPL-MCNC: 11 MG/DL (ref 5–25)
CALCIUM SERPL-MCNC: 8.9 MG/DL (ref 8.3–10.1)
CHLORIDE SERPL-SCNC: 104 MMOL/L (ref 100–108)
CO2 SERPL-SCNC: 28 MMOL/L (ref 21–32)
CREAT SERPL-MCNC: 1.07 MG/DL (ref 0.6–1.3)
CRP SERPL QL: 9.6 MG/L
EOSINOPHIL # BLD AUTO: 0.07 THOUSAND/ΜL (ref 0–0.61)
EOSINOPHIL NFR BLD AUTO: 1 % (ref 0–6)
ERYTHROCYTE [DISTWIDTH] IN BLOOD BY AUTOMATED COUNT: 18.2 % (ref 11.6–15.1)
ERYTHROCYTE [SEDIMENTATION RATE] IN BLOOD: 16 MM/HOUR (ref 0–14)
GFR SERPL CREATININE-BSD FRML MDRD: 93 ML/MIN/1.73SQ M
GLUCOSE SERPL-MCNC: 90 MG/DL (ref 65–140)
HCT VFR BLD AUTO: 41.5 % (ref 36.5–49.3)
HGB BLD-MCNC: 12.4 G/DL (ref 12–17)
IMM GRANULOCYTES # BLD AUTO: 0.06 THOUSAND/UL (ref 0–0.2)
IMM GRANULOCYTES NFR BLD AUTO: 1 % (ref 0–2)
LACTATE SERPL-SCNC: 2 MMOL/L (ref 0.5–2)
LYMPHOCYTES # BLD AUTO: 1.54 THOUSANDS/ΜL (ref 0.6–4.47)
LYMPHOCYTES NFR BLD AUTO: 17 % (ref 14–44)
MCH RBC QN AUTO: 18.9 PG (ref 26.8–34.3)
MCHC RBC AUTO-ENTMCNC: 29.9 G/DL (ref 31.4–37.4)
MCV RBC AUTO: 63 FL (ref 82–98)
MONOCYTES # BLD AUTO: 0.74 THOUSAND/ΜL (ref 0.17–1.22)
MONOCYTES NFR BLD AUTO: 8 % (ref 4–12)
NEUTROPHILS # BLD AUTO: 6.55 THOUSANDS/ΜL (ref 1.85–7.62)
NEUTS SEG NFR BLD AUTO: 72 % (ref 43–75)
NRBC BLD AUTO-RTO: 0 /100 WBCS
PLATELET # BLD AUTO: 469 THOUSANDS/UL (ref 149–390)
PMV BLD AUTO: 8.5 FL (ref 8.9–12.7)
POTASSIUM SERPL-SCNC: 3.4 MMOL/L (ref 3.5–5.3)
RBC # BLD AUTO: 6.57 MILLION/UL (ref 3.88–5.62)
SODIUM SERPL-SCNC: 142 MMOL/L (ref 136–145)
WBC # BLD AUTO: 9.03 THOUSAND/UL (ref 4.31–10.16)

## 2022-07-16 PROCEDURE — 96374 THER/PROPH/DIAG INJ IV PUSH: CPT

## 2022-07-16 PROCEDURE — 99285 EMERGENCY DEPT VISIT HI MDM: CPT | Performed by: EMERGENCY MEDICINE

## 2022-07-16 PROCEDURE — 99284 EMERGENCY DEPT VISIT MOD MDM: CPT

## 2022-07-16 PROCEDURE — 85025 COMPLETE CBC W/AUTO DIFF WBC: CPT | Performed by: EMERGENCY MEDICINE

## 2022-07-16 PROCEDURE — 96375 TX/PRO/DX INJ NEW DRUG ADDON: CPT

## 2022-07-16 PROCEDURE — 86140 C-REACTIVE PROTEIN: CPT | Performed by: EMERGENCY MEDICINE

## 2022-07-16 PROCEDURE — 80048 BASIC METABOLIC PNL TOTAL CA: CPT | Performed by: EMERGENCY MEDICINE

## 2022-07-16 PROCEDURE — 85652 RBC SED RATE AUTOMATED: CPT | Performed by: EMERGENCY MEDICINE

## 2022-07-16 PROCEDURE — 36415 COLL VENOUS BLD VENIPUNCTURE: CPT | Performed by: EMERGENCY MEDICINE

## 2022-07-16 PROCEDURE — 83605 ASSAY OF LACTIC ACID: CPT | Performed by: EMERGENCY MEDICINE

## 2022-07-16 PROCEDURE — 96361 HYDRATE IV INFUSION ADD-ON: CPT

## 2022-07-16 RX ORDER — HYDROMORPHONE HCL 110MG/55ML
2 PATIENT CONTROLLED ANALGESIA SYRINGE INTRAVENOUS ONCE
Status: COMPLETED | OUTPATIENT
Start: 2022-07-16 | End: 2022-07-16

## 2022-07-16 RX ORDER — MIDAZOLAM HYDROCHLORIDE 2 MG/2ML
1 INJECTION, SOLUTION INTRAMUSCULAR; INTRAVENOUS ONCE
Status: DISCONTINUED | OUTPATIENT
Start: 2022-07-16 | End: 2022-07-16

## 2022-07-16 RX ORDER — KETOROLAC TROMETHAMINE 30 MG/ML
15 INJECTION, SOLUTION INTRAMUSCULAR; INTRAVENOUS ONCE
Status: DISCONTINUED | OUTPATIENT
Start: 2022-07-16 | End: 2022-07-16

## 2022-07-16 RX ORDER — ONDANSETRON 2 MG/ML
4 INJECTION INTRAMUSCULAR; INTRAVENOUS ONCE
Status: COMPLETED | OUTPATIENT
Start: 2022-07-16 | End: 2022-07-16

## 2022-07-16 RX ORDER — METHYLPREDNISOLONE SODIUM SUCCINATE 125 MG/2ML
60 INJECTION, POWDER, LYOPHILIZED, FOR SOLUTION INTRAMUSCULAR; INTRAVENOUS ONCE
Status: COMPLETED | OUTPATIENT
Start: 2022-07-16 | End: 2022-07-16

## 2022-07-16 RX ORDER — METOCLOPRAMIDE HYDROCHLORIDE 5 MG/ML
10 INJECTION INTRAMUSCULAR; INTRAVENOUS ONCE
Status: COMPLETED | OUTPATIENT
Start: 2022-07-16 | End: 2022-07-16

## 2022-07-16 RX ADMIN — METOCLOPRAMIDE HYDROCHLORIDE 10 MG: 5 INJECTION INTRAMUSCULAR; INTRAVENOUS at 23:30

## 2022-07-16 RX ADMIN — METHYLPREDNISOLONE SODIUM SUCCINATE 60 MG: 125 INJECTION, POWDER, FOR SOLUTION INTRAMUSCULAR; INTRAVENOUS at 23:33

## 2022-07-16 RX ADMIN — ONDANSETRON 4 MG: 2 INJECTION INTRAMUSCULAR; INTRAVENOUS at 23:30

## 2022-07-16 RX ADMIN — SODIUM CHLORIDE 1000 ML: 0.9 INJECTION, SOLUTION INTRAVENOUS at 23:20

## 2022-07-16 RX ADMIN — HYDROMORPHONE HYDROCHLORIDE 2 MG: 2 INJECTION, SOLUTION INTRAMUSCULAR; INTRAVENOUS; SUBCUTANEOUS at 23:32

## 2022-07-17 ENCOUNTER — APPOINTMENT (EMERGENCY)
Dept: RADIOLOGY | Facility: HOSPITAL | Age: 27
End: 2022-07-17
Payer: COMMERCIAL

## 2022-07-17 VITALS
WEIGHT: 180 LBS | BODY MASS INDEX: 26.58 KG/M2 | DIASTOLIC BLOOD PRESSURE: 85 MMHG | TEMPERATURE: 98 F | HEART RATE: 74 BPM | SYSTOLIC BLOOD PRESSURE: 134 MMHG | OXYGEN SATURATION: 95 % | RESPIRATION RATE: 18 BRPM

## 2022-07-17 PROCEDURE — 74018 RADEX ABDOMEN 1 VIEW: CPT

## 2022-07-17 PROCEDURE — 96372 THER/PROPH/DIAG INJ SC/IM: CPT

## 2022-07-17 PROCEDURE — G1004 CDSM NDSC: HCPCS

## 2022-07-17 PROCEDURE — 74176 CT ABD & PELVIS W/O CONTRAST: CPT

## 2022-07-17 PROCEDURE — 96376 TX/PRO/DX INJ SAME DRUG ADON: CPT

## 2022-07-17 PROCEDURE — 96361 HYDRATE IV INFUSION ADD-ON: CPT

## 2022-07-17 PROCEDURE — 96375 TX/PRO/DX INJ NEW DRUG ADDON: CPT

## 2022-07-17 RX ORDER — ONDANSETRON 2 MG/ML
4 INJECTION INTRAMUSCULAR; INTRAVENOUS ONCE
Status: COMPLETED | OUTPATIENT
Start: 2022-07-17 | End: 2022-07-17

## 2022-07-17 RX ORDER — KETAMINE HCL IN NACL, ISO-OSM 100MG/10ML
0.3 SYRINGE (ML) INJECTION ONCE
Status: COMPLETED | OUTPATIENT
Start: 2022-07-17 | End: 2022-07-17

## 2022-07-17 RX ORDER — HALOPERIDOL 5 MG/ML
10 INJECTION INTRAMUSCULAR ONCE
Status: COMPLETED | OUTPATIENT
Start: 2022-07-17 | End: 2022-07-17

## 2022-07-17 RX ORDER — HYDROMORPHONE HCL 110MG/55ML
2 PATIENT CONTROLLED ANALGESIA SYRINGE INTRAVENOUS ONCE
Status: COMPLETED | OUTPATIENT
Start: 2022-07-17 | End: 2022-07-17

## 2022-07-17 RX ADMIN — HALOPERIDOL LACTATE 10 MG: 5 INJECTION, SOLUTION INTRAMUSCULAR at 02:55

## 2022-07-17 RX ADMIN — HYDROMORPHONE HYDROCHLORIDE 2 MG: 2 INJECTION, SOLUTION INTRAMUSCULAR; INTRAVENOUS; SUBCUTANEOUS at 00:42

## 2022-07-17 RX ADMIN — ONDANSETRON 4 MG: 2 INJECTION INTRAMUSCULAR; INTRAVENOUS at 00:42

## 2022-07-17 RX ADMIN — Medication 24 MG: at 03:54

## 2022-07-17 NOTE — ED PROVIDER NOTES
Final Diagnosis:  1  Abdominal pain    2  IBD (inflammatory bowel disease)        Chief Complaint   Patient presents with    Abdominal Pain     States pain lower abdomen started 1 hour ago      HPI  57-year-old frequent visits to the emergency room for pain control  He has a history of ulcer colitis now considered possibility of Crohn's  He had a colectomy several years ago has ileal pouch complicated by SBO and ileal pouch itis  By some ice I am I admitted him he saw gastroenterologist here considered doing a pouch os copy but was concerned about the inflammation started on steroid Cipro Flagyl ultimately patient discharged with symptomatic improvement  Says he slept for 24 hours missed a dose of steroids missed a dose of antibiotics for this reason  Woke up in the pain was back  He still having bowel movements had 1 recently with just small streaks of mucousy red  Presents with vomiting significant abdominal pain  As usual we discuss narcotic risks of addiction which I am concerned about for this patient as well as frequent CT radiation toxicity which I am also concerned about for this patient  Get him drinking barium with multiple antiemetics ultimately requires 2 doses Zofran Reglan and Haldol  Drinks barium after dose of Dilaudid  Cannot have Toradol because GI is recommending against NSAIDs for E his UC  Cannot tolerate orals like Tylenol  Give steroids as he has missed a dose  I try a single KUB as discussed with prior radiologist to minimize radiation however Radiology called me tells me it is insufficient to rule out SBO  Recommend CT    - No language barrier    - History obtained from patient  - There are no limitations to the history obtained  - Previous charting underwent limited review with attention to last ED visits, labs, ekgs, and prior imaging      PMH:   has a past medical history of Ankylosing spondylitis ([address] ), Anxiety, Bowel obstruction ([address] ), Clostridium difficile colitis (9/13/2018), Colitis, 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[facility] [address] ) (9/13/2018), Pancreatitis, and Ulcerative colitis ([address] )  PSH:   has a past surgical history that includes Total colectomy; COLECTOMY TOTAL; and IR PICC placement single lumen (3/1/2022)  Social History:    Tobacco Use: Low Risk     Smoking Tobacco Use: Never Smoker    Smokeless Tobacco Use: Never Used     Alcohol Use: Not on file     Patient with no illicit use    ROS:    Pertinent positives/negatives:   Review of Systems   Gastrointestinal: Positive for abdominal pain, blood in stool, constipation, nausea and vomiting  Negative for diarrhea  CONSTITUTIONAL:  No dizziness  No weakness  No unexpected weight loss  EYES:  No pain, erythema, or discharge  No loss of vision  ENT:  No tinnitus, decreased hearing  No epistaxis/purulent drainage  No voice change, airway closing, trismus  CARDIOVASCULAR:  No chest pain  No palpitations  No new lower extremity edema  RESPIRATORY:  No purulent cough  No hemoptysis  No dyspnea  No paroxysmal nocturnal dyspnea  No stridor, audible wheezing bedside  GASTROINTESTINAL:  Normal appetite  No vomiting, diarrhea  No pain  No bloating  No melena  GENITOURINARY:  No frequency, urgency, nocturia  No hematuria or dysuria  No discharge  No sores/adenopathy  MUSCULOSKELETAL:  No arthralgias or myalgias that are new  INTEGUMENTARY:  No swelling  No unexpected contusions  No abrasions  No lymphangitis  NEUROLOGIC:  No meningismus  No numbness of the extremities  No new focal weakness  No postural instability  PSYCHIATRIC:  No SI HI AVH  HEMATOLOGICAL:  No bleeding  No petechiae  No bruising  ALLERGIES:  No urticaria  No sudden abd cramping  No stridor      PE:     Physical exam highlights:   Physical Exam       Vitals:    07/17/22 0344 07/17/22 0400 07/17/22 0630 07/17/22 0719   BP: 138/97   134/85   BP Location:    Right arm   Pulse: (!) 120 91 85 74   Resp: 20   18   Temp:       TempSrc:       SpO2: 96% 93% 94% 95% Weight:         Vitals reviewed by me  Nursing note reviewed  Chaperone present for all sensitive exam   Const: No acute distress  Alert  Nontoxic  Not diaphoretic  HEENT: External ears normal  No protrusion drainage swelling  Nose normal  No drainage/traumatic deformity  MMM  Mouth with baseline/symmetric movement  No trismus  Eyes: No squinting  No icterus  Tracks through the room with normal EOM  No tearing/swelling/drainage  Neck: ROM normal  No rigidity  No meningismus  Cards: Rate as per vitals  Compared to monitor sinus unless documented above  Regular  Well perfused  Pulm: able to verbalize without additional effort  Effort and excursion normal  No disress  No audible wheezing/ stridor  Normal resp rate  Abd: No distension beyond baseline  No fluctuant wave  Patient without peritoneal pain with shifting/bumping the bed  MSK: ROM normal and baseline  No deformity  Skin: No new rashes visible  Well perfused  Neuro: Nonfocal  Baseline  CN grossly intact  Moving all four with coordination  Psych: Normal behavior and affect  A:  - Nursing note reviewed  Ddx and MDM  crohns      Steroids  Pouchitis  sbo                          CT abdomen pelvis wo contrast   Final Result      Persistent mild wall thickening at the distal ileum and ileoanal anastomosis, though slightly decreased from prior  Otherwise, no significant interval change from prior  Specifically, no evidence for developing obstruction  Workstation performed: [id number]         XR abdomen 1 view kub   Final Result      Nonspecific bowel gas pattern  Subsequently performed CT demonstrated no evidence of bowel obstruction                 Workstation performed: [id number]           Orders Placed This Encounter   Procedures    XR abdomen 1 view kub    CT abdomen pelvis wo contrast    CBC and differential    Basic metabolic panel    Lactic acid, plasma    Sedimentation rate, automated    C-reactive protein    Ambulatory Referral to Gastroenterology     Labs Reviewed   CBC AND DIFFERENTIAL - Abnormal       Result Value Ref Range Status    WBC 9 03  4 31 - 10 16 Thousand/uL Final    RBC 6 57 (*) 3 88 - 5 62 Million/uL Final    Hemoglobin 12 4  12 0 - 17 0 g/dL Final    Hematocrit 41 5  36 5 - 49 3 % Final    MCV 63 (*) 82 - 98 fL Final    MCH 18 9 (*) 26 8 - 34 3 pg Final    MCHC 29 9 (*) 31 4 - 37 4 g/dL Final    RDW 18 2 (*) 11 6 - 15 1 % Final    MPV 8 5 (*) 8 9 - 12 7 fL Final    Platelets 262 (*) 460 - 390 Thousands/uL Final    nRBC 0  /100 WBCs Final    Neutrophils Relative 72  43 - 75 % Final    Immat GRANS % 1  0 - 2 % Final    Lymphocytes Relative 17  14 - 44 % Final    Monocytes Relative 8  4 - 12 % Final    Eosinophils Relative 1  0 - 6 % Final    Basophils Relative 1  0 - 1 % Final    Neutrophils Absolute 6 55  1 85 - 7 62 Thousands/µL Final    Immature Grans Absolute 0 06  0 00 - 0 20 Thousand/uL Final    Lymphocytes Absolute 1 54  0 60 - 4 47 Thousands/µL Final    Monocytes Absolute 0 74  0 17 - 1 22 Thousand/µL Final    Eosinophils Absolute 0 07  0 00 - 0 61 Thousand/µL Final    Basophils Absolute 0 07  0 00 - 0 10 Thousands/µL Final   BASIC METABOLIC PANEL - Abnormal    Sodium 142  136 - 145 mmol/L Final    Potassium 3 4 (*) 3 5 - 5 3 mmol/L Final    Chloride 104  100 - 108 mmol/L Final    CO2 28  21 - 32 mmol/L Final    ANION GAP 10  4 - 13 mmol/L Final    BUN 11  5 - 25 mg/dL Final    Creatinine 1 07  0 60 - 1 30 mg/dL Final    Comment: Standardized to IDMS reference method    Glucose 90  65 - 140 mg/dL Final    Comment: If the patient is fasting, the ADA then defines impaired fasting glucose as > 100 mg/dL and diabetes as > or equal to 123 mg/dL  Specimen collection should occur prior to Sulfasalazine administration due to the potential for falsely depressed results  Specimen collection should occur prior to Sulfapyridine administration due to the potential for falsely elevated results      Calcium 8 9 8 3 - 10 1 mg/dL Final    eGFR 93  ml/min/1 73sq m Final    Narrative:     [place] guidelines for Chronic Kidney Disease (CKD):     Stage 1 with normal or high GFR (GFR > 90 mL/min/1 73 square meters)    Stage 2 Mild CKD (GFR = 60-89 mL/min/1 73 square meters)    Stage 3A Moderate CKD (GFR = 45-59 mL/min/1 73 square meters)    Stage 3B Moderate CKD (GFR = 30-44 mL/min/1 73 square meters)    Stage 4 Severe CKD (GFR = 15-29 mL/min/1 73 square meters)    Stage 5 End Stage CKD (GFR <15 mL/min/1 73 square meters)  Note: GFR calculation is accurate only with a steady state creatinine   SEDIMENTATION RATE - Abnormal    Sed Rate 16 (*) 0 - 14 mm/hour Final   C-REACTIVE PROTEIN - Abnormal    CRP 9 6 (*) <3 0 mg/L Final   LACTIC ACID, PLASMA - Normal    LACTIC ACID 2 0  0 5 - 2 0 mmol/L Final    Narrative:     Result may be elevated if tourniquet was used during collection  Final Diagnosis:  1  Abdominal pain    2   IBD (inflammatory bowel disease)        P:  - hospital tx includes   Medications   ondansetron (ZOFRAN) injection 4 mg (4 mg Intravenous Given 7/16/22 2330)   metoclopramide (REGLAN) injection 10 mg (10 mg Intravenous Given 7/16/22 2330)   HYDROmorphone (DILAUDID) injection 2 mg (2 mg Intravenous Given 7/16/22 2332)   methylPREDNISolone sodium succinate (Solu-MEDROL) injection 60 mg (60 mg Intravenous Given 7/16/22 2333)   sodium chloride 0 9 % bolus 1,000 mL (0 mL Intravenous Stopped 7/17/22 0256)   HYDROmorphone (DILAUDID) injection 2 mg (2 mg Intravenous Given 7/17/22 0042)   ondansetron (ZOFRAN) injection 4 mg (4 mg Intravenous Given 7/17/22 0042)   haloperidol lactate (HALDOL) injection 10 mg (10 mg Intramuscular Given 7/17/22 0255)   barium (READI-CAT 2) suspension 900 mL (900 mL Oral Given 7/17/22 0000)   Ketamine HCl 24 mg (24 mg Intravenous Given 7/17/22 0354)         - disposition  Time reflects when diagnosis was documented in both MDM as applicable and the Disposition within this note     Time User Action Codes Description Comment    7/17/2022  7:11 AM Sky Pott Add [R10 9] Abdominal pain     7/17/2022  7:11 AM Sky Holtt Add [K52 9] IBD (inflammatory bowel disease)       ED Disposition     ED Disposition   Discharge    Condition   Stable    Date/Time   Sun Jul 17, 2022  7:11 AM    Comment   Mio Rapp discharge to home/self care  Follow-up Information     Follow up With Specialties Details Why Contact Info    Jacelyn Dakins, MD Gastroenterology Schedule an appointment as soon as possible for a visit   [address] [place] [address]  [phone]            - patient will call their PCP to let them know they were in the emergency department   We discuss return precautions       - additional tx intended, if consistent with primary provider:  - patient to follow with :      Discharge Medication List as of 7/17/2022  7:12 AM      CONTINUE these medications which have NOT CHANGED    Details   DULoxetine (CYMBALTA) 60 mg delayed release capsule Take 1 capsule (60 mg total) by mouth daily, Starting Fri 6/10/2022, Normal      metroNIDAZOLE (FLAGYL) 500 mg tablet Take 1 tablet (500 mg total) by mouth every 8 (eight) hours, Starting Thu 7/14/2022, Until Sat 8/13/2022, Normal      pantoprazole (PROTONIX) 20 mg tablet Take 1 tablet (20 mg total) by mouth daily, Starting Fri 7/8/2022, Normal      predniSONE 10 mg tablet Multiple Dosages:Starting Thu 7/14/2022, Until Wed 7/20/2022 at 2359, THEN Starting u 7/21/2022, Until Wed 7/27/2022 at 2359, THEN Starting u 7/28/2022, Until Wed 8/3/2022 at 2359, THEN Starting u 8/4/2022, Until Wed 8/10/2022 at 2359, [address] [facility] Thu 8/11/2022, Until Wed 8/17/2022 at 2359, THEN Starting u 8/18/2022, Until Wed 8/24/2022 at 2359Take 6 tablets (60 mg total) by mouth daily for 7 days, THEN 5 tablets (50 mg total) daily for 7 days, THEN 4 tablets (40 mg total) daily for 7 d ays, THEN 3 tablets (30 mg total) daily for 7 days, THEN 2 tablets (20 mg total) daily for 7 days, THEN 1 tablet (10 mg total) daily for 7 days  , Normal      sulfaSALAzine (AZULFIDINE) 500 mg tablet Take 2 tablets (1,000 mg total) by mouth 2 (two) times a day, Starting Wed 6/1/2022, No Print             Prior to Admission Medications   Prescriptions Last Dose Informant Patient Reported? Taking? DULoxetine (CYMBALTA) 60 mg delayed release capsule   No No   Sig: Take 1 capsule (60 mg total) by mouth daily   metroNIDAZOLE (FLAGYL) 500 mg tablet   No No   Sig: Take 1 tablet (500 mg total) by mouth every 8 (eight) hours   pantoprazole (PROTONIX) 20 mg tablet   No No   Sig: Take 1 tablet (20 mg total) by mouth daily   Patient not taking: Reported on 7/12/2022   predniSONE 10 mg tablet   No No   Sig: Take 6 tablets (60 mg total) by mouth daily for 7 days, THEN 5 tablets (50 mg total) daily for 7 days, THEN 4 tablets (40 mg total) daily for 7 days, THEN 3 tablets (30 mg total) daily for 7 days, THEN 2 tablets (20 mg total) daily for 7 days, THEN 1 tablet (10 mg total) daily for 7 days  sulfaSALAzine (AZULFIDINE) 500 mg tablet   No No   Sig: Take 2 tablets (1,000 mg total) by mouth 2 (two) times a day   Patient not taking: Reported on 7/12/2022      Facility-Administered Medications: None       Portions of the record may have been created with voice recognition software  Occasional wrong word or "sound a like" substitutions may have occurred due to the inherent limitations of voice recognition software  Read the chart carefully and recognize, using context, where substitutions have occurred      Electronically signed by:  MD Lotus Glass MD  07/17/22 9962

## 2022-07-17 NOTE — ED NOTES
Pt   Returned from x-ray and immediately was called for requesting pain medication  Dr Stephanie Garcia was made aware at this time  Pt  Continue to take himself off the monitor and states he is hot and then cold and can not have all the wires on him        Ariagora Ramu, ZACHARY  07/17/22 9514

## 2022-07-17 NOTE — ED NOTES
Pt  Ady Bryant to x-ray at this time on [address] [place] [address]  07/17/22 0099

## 2022-07-17 NOTE — TELEPHONE ENCOUNTER
----- Message from Jethro Krishnan DO sent at 7/16/2022  8:42 PM EDT -----  Regarding: RE: Discharge  Thank you for allowing us to participate in the care of your patient, Jocelyne Sofia, who was hospitalized from 7/11/2022 through 7/16/2022 with the admitting diagnosis of intractable abdominal pain  CT showed markedly distended ileoanal anastomosis with surrounding inflammatory changes  Treated with Cipro, Flagyl along with 20 mg of IV Solu-Medrol q 8 hours while here  Discharge Flagyl t i d  For 1 month and prednisone starting at 60 mg which will be decreased by 10 mg every 7 days until he completes it  Advised patient to follow up with surgeon in [place] for further management      If you have any additional questions or would like to discuss further, please feel free to contact me      Jethro Krishnan, [address] [facility] Internal [facility], Hospitalist  [phone]

## 2022-07-17 NOTE — ED NOTES
Pt  Went to CT scan and returned to room        Jessica Cuevas, [address] [place] [address]  07/17/22 3915

## 2022-07-17 NOTE — ED NOTES
Pt  Called for and requested more pain medication  Vital signs taken at this time  Pt  Is  Very restless in the bed    Pt  Is moaning stating " I am in excruciating pain"  Dr Arin Weaver made aware and will speak with the patient at this time         Ingris Donis RN  07/17/22 5454

## 2022-07-17 NOTE — ED NOTES
Pt  States that  He feels better and would prefer to be discharged and go home and no cat scan  Dr Germania Soria was informed and told patient that it would be against medical advise  Pt  Has decided to stay for Cat Scan        Kwanreji GallagherEn[facility] [place]  07/17/22 8703

## 2022-07-17 NOTE — ED NOTES
Pt  Given ice pack and   Adjusted in bed at this time while ketamine infuses        BP stable and o2 sat 94%     Dominick Christopher RN  07/17/22 9884

## 2022-07-18 ENCOUNTER — PATIENT OUTREACH (OUTPATIENT)
Dept: CASE MANAGEMENT | Facility: OTHER | Age: 27
End: 2022-07-18

## 2022-07-18 NOTE — PROGRESS NOTES
Outpatient Care Management   [facility] committee reviewed & determined that continued OP Care Management as a Rising Utilizer would be beneficial  Update to Nayla Bueno

## 2022-07-18 NOTE — UTILIZATION REVIEW
Notification of Discharge   This is a Notification of Discharge from our facility [address]  Please be advised that this patient has been discharge from our facility  Below you will find the admission and discharge date and time including the patients disposition  UTILIZATION REVIEW CONTACT:  Maddi Cat  Utilization   Network Utilization Review Department  Phone: [phone] x carefully listen to the prompts  All voicemails are confidential   Email: [email or web address]     PHYSICIAN ADVISORY SERVICES:  FOR ZUTH-[id number] REVIEW - MEDICAL NECESSITY DENIAL  Phone: [phone]  Fax: [phone]  Email: [email or web address]     PRESENTATION DATE: 7/11/2022  8:52 PM  OBERVATION ADMISSION DATE:   INPATIENT ADMISSION DATE: 7/13/22  3:26 PM   DISCHARGE DATE: 7/14/2022  5:57 PM  DISPOSITION: Home/Self Care Home/Self Care      IMPORTANT INFORMATION:  Send all requests for admission clinical reviews, approved or denied determinations and any other requests to dedicated fax number below belonging to the campus where the patient is receiving treatment   List of dedicated fax numbers:  [address] DENIALS (Administrative/Medical Necessity) [phone]   [address] (Maternity/NICU/Pediatrics) [phone]   Mel Hernandez [phone]   130 [facility] [phone]   [address] [place] [address] [phone]   2000 [facility] [address] [place] [address],[address] [phone]   [facility]  [phone]   [address] [place] [address]  2401 [facility] 1000 [facility] [phone]

## 2022-07-19 ENCOUNTER — PATIENT OUTREACH (OUTPATIENT)
Dept: FAMILY MEDICINE CLINIC | Facility: CLINIC | Age: 27
End: 2022-07-19

## 2022-07-19 NOTE — PROGRESS NOTES
Chart reviewed  OPCM RN called patient and discussed the role of CM and medical follow up  Patient was discharged from the hospital on 7/16  He was admitted on 7/11 with intractable abdominal pain which has been a long standing issue due to his ulcerative colitis and subsequent surgeries  Medications were reviewed  He was discharged on two new medications   , Prednisone taper and Flagyl  He states he was able to obtain these medications and is taking them as directed  Patient states he had a virtual follow up appointment with his surgeon at [facility] yesterday 7/18 and is scheduled for an MRI next week  Once this is done he will follow up with his surgeon again  He states he may need another surgery  Patient agrees to follow up calls with [facility] RN and was very appreciative of call today  OPCM RN to follow

## 2022-07-22 ENCOUNTER — PATIENT OUTREACH (OUTPATIENT)
Dept: FAMILY MEDICINE CLINIC | Facility: CLINIC | Age: 27
End: 2022-07-22

## 2022-08-10 ENCOUNTER — PATIENT OUTREACH (OUTPATIENT)
Dept: FAMILY MEDICINE CLINIC | Facility: CLINIC | Age: 27
End: 2022-08-10

## 2022-08-10 NOTE — PROGRESS NOTES
Chart reviewed by [facility] RN  I called patient to check on how he is feeling  Patient states he is feeling very well  He is now only following with his Surgeon Dr Frieda Ferreira out of [facility]  He has not had any ER visits since last discharge  Denies nay issues with transportation or obtaining medications  He states he is taking all his medications as directed  He declines the need for any services or any more follow up calls  Patient was provided with my contact information in case any issues or questions arise  Comples case management episode was closed

## 2022-08-21 ENCOUNTER — HOSPITAL ENCOUNTER (INPATIENT)
Facility: HOSPITAL | Age: 27
LOS: 5 days | Discharge: HOME | DRG: 394 | End: 2022-08-27
Attending: EMERGENCY MEDICINE | Admitting: FAMILY MEDICINE
Payer: COMMERCIAL

## 2022-08-21 ENCOUNTER — APPOINTMENT (EMERGENCY)
Dept: RADIOLOGY | Facility: HOSPITAL | Age: 27
DRG: 394 | End: 2022-08-21
Payer: COMMERCIAL

## 2022-08-21 DIAGNOSIS — K91.850 POUCHITIS (HCC): ICD-10-CM

## 2022-08-21 DIAGNOSIS — R10.9 INTRACTABLE ABDOMINAL PAIN: Primary | ICD-10-CM

## 2022-08-21 PROBLEM — R79.82 ELEVATED C-REACTIVE PROTEIN (CRP): Status: ACTIVE | Noted: 2022-08-21

## 2022-08-21 LAB
ALBUMIN SERPL BCP-MCNC: 4.2 G/DL (ref 3.5–5)
ALP SERPL-CCNC: 89 U/L (ref 46–116)
ALT SERPL W P-5'-P-CCNC: 29 U/L (ref 12–78)
ANION GAP SERPL CALCULATED.3IONS-SCNC: 12 MMOL/L (ref 4–13)
AST SERPL W P-5'-P-CCNC: 16 U/L (ref 5–45)
BACTERIA UR QL AUTO: NORMAL /HPF
BASOPHILS # BLD AUTO: 0.1 THOUSANDS/ΜL (ref 0–0.1)
BASOPHILS NFR BLD AUTO: 0 % (ref 0–1)
BILIRUB SERPL-MCNC: 1.63 MG/DL (ref 0.2–1)
BILIRUB UR QL STRIP: ABNORMAL
BUN SERPL-MCNC: 12 MG/DL (ref 5–25)
CALCIUM SERPL-MCNC: 9.4 MG/DL (ref 8.3–10.1)
CHLORIDE SERPL-SCNC: 92 MMOL/L (ref 96–108)
CLARITY UR: CLEAR
CO2 SERPL-SCNC: 28 MMOL/L (ref 21–32)
COLOR UR: YELLOW
CREAT SERPL-MCNC: 1.27 MG/DL (ref 0.6–1.3)
CRP SERPL QL: 89 MG/L
EOSINOPHIL # BLD AUTO: 0.01 THOUSAND/ΜL (ref 0–0.61)
EOSINOPHIL NFR BLD AUTO: 0 % (ref 0–6)
ERYTHROCYTE [DISTWIDTH] IN BLOOD BY AUTOMATED COUNT: 18.7 % (ref 11.6–15.1)
GFR SERPL CREATININE-BSD FRML MDRD: 75 ML/MIN/1.73SQ M
GLUCOSE SERPL-MCNC: 92 MG/DL (ref 65–140)
GLUCOSE UR STRIP-MCNC: NEGATIVE MG/DL
HCT VFR BLD AUTO: 41.9 % (ref 36.5–49.3)
HGB BLD-MCNC: 12.8 G/DL (ref 12–17)
HGB UR QL STRIP.AUTO: ABNORMAL
IMM GRANULOCYTES # BLD AUTO: 0.15 THOUSAND/UL (ref 0–0.2)
IMM GRANULOCYTES NFR BLD AUTO: 1 % (ref 0–2)
KETONES UR STRIP-MCNC: ABNORMAL MG/DL
LACTATE SERPL-SCNC: 0.8 MMOL/L (ref 0.5–2)
LEUKOCYTE ESTERASE UR QL STRIP: NEGATIVE
LIPASE SERPL-CCNC: 60 U/L (ref 73–393)
LYMPHOCYTES # BLD AUTO: 1.11 THOUSANDS/ΜL (ref 0.6–4.47)
LYMPHOCYTES NFR BLD AUTO: 4 % (ref 14–44)
MCH RBC QN AUTO: 19.2 PG (ref 26.8–34.3)
MCHC RBC AUTO-ENTMCNC: 30.5 G/DL (ref 31.4–37.4)
MCV RBC AUTO: 63 FL (ref 82–98)
MONOCYTES # BLD AUTO: 1.93 THOUSAND/ΜL (ref 0.17–1.22)
MONOCYTES NFR BLD AUTO: 7 % (ref 4–12)
NEUTROPHILS # BLD AUTO: 23.31 THOUSANDS/ΜL (ref 1.85–7.62)
NEUTS SEG NFR BLD AUTO: 88 % (ref 43–75)
NITRITE UR QL STRIP: NEGATIVE
NON-SQ EPI CELLS URNS QL MICRO: NORMAL /HPF
NRBC BLD AUTO-RTO: 0 /100 WBCS
PH UR STRIP.AUTO: 6 [PH]
PLATELET # BLD AUTO: 414 THOUSANDS/UL (ref 149–390)
PMV BLD AUTO: 9.1 FL (ref 8.9–12.7)
POTASSIUM SERPL-SCNC: 3.3 MMOL/L (ref 3.5–5.3)
PROT SERPL-MCNC: 8.2 G/DL (ref 6.4–8.4)
PROT UR STRIP-MCNC: ABNORMAL MG/DL
RBC # BLD AUTO: 6.68 MILLION/UL (ref 3.88–5.62)
RBC #/AREA URNS AUTO: NORMAL /HPF
SARS-COV-2 RNA RESP QL NAA+PROBE: NEGATIVE
SODIUM SERPL-SCNC: 132 MMOL/L (ref 135–147)
SP GR UR STRIP.AUTO: 1.02 (ref 1–1.03)
UROBILINOGEN UR QL STRIP.AUTO: 0.2 E.U./DL
WBC # BLD AUTO: 26.61 THOUSAND/UL (ref 4.31–10.16)
WBC #/AREA URNS AUTO: NORMAL /HPF

## 2022-08-21 PROCEDURE — 87493 C DIFF AMPLIFIED PROBE: CPT | Performed by: FAMILY MEDICINE

## 2022-08-21 PROCEDURE — 96375 TX/PRO/DX INJ NEW DRUG ADDON: CPT

## 2022-08-21 PROCEDURE — U0003 INFECTIOUS AGENT DETECTION BY NUCLEIC ACID (DNA OR RNA); SEVERE ACUTE RESPIRATORY SYNDROME CORONAVIRUS 2 (SARS-COV-2) (CORONAVIRUS DISEASE [COVID-19]), AMPLIFIED PROBE TECHNIQUE, MAKING USE OF HIGH THROUGHPUT TECHNOLOGIES AS DESCRIBED BY CMS-2020-01-R: HCPCS | Performed by: EMERGENCY MEDICINE

## 2022-08-21 PROCEDURE — 96361 HYDRATE IV INFUSION ADD-ON: CPT

## 2022-08-21 PROCEDURE — 96376 TX/PRO/DX INJ SAME DRUG ADON: CPT

## 2022-08-21 PROCEDURE — 99285 EMERGENCY DEPT VISIT HI MDM: CPT | Performed by: EMERGENCY MEDICINE

## 2022-08-21 PROCEDURE — G1004 CDSM NDSC: HCPCS

## 2022-08-21 PROCEDURE — 80053 COMPREHEN METABOLIC PANEL: CPT | Performed by: EMERGENCY MEDICINE

## 2022-08-21 PROCEDURE — 86140 C-REACTIVE PROTEIN: CPT | Performed by: EMERGENCY MEDICINE

## 2022-08-21 PROCEDURE — 74177 CT ABD & PELVIS W/CONTRAST: CPT

## 2022-08-21 PROCEDURE — 99285 EMERGENCY DEPT VISIT HI MDM: CPT

## 2022-08-21 PROCEDURE — U0005 INFEC AGEN DETEC AMPLI PROBE: HCPCS | Performed by: EMERGENCY MEDICINE

## 2022-08-21 PROCEDURE — 96365 THER/PROPH/DIAG IV INF INIT: CPT

## 2022-08-21 PROCEDURE — 99220 PR INITIAL OBSERVATION CARE/DAY 70 MINUTES: CPT | Performed by: FAMILY MEDICINE

## 2022-08-21 PROCEDURE — 87040 BLOOD CULTURE FOR BACTERIA: CPT | Performed by: EMERGENCY MEDICINE

## 2022-08-21 PROCEDURE — 81001 URINALYSIS AUTO W/SCOPE: CPT | Performed by: EMERGENCY MEDICINE

## 2022-08-21 PROCEDURE — 85025 COMPLETE CBC W/AUTO DIFF WBC: CPT | Performed by: EMERGENCY MEDICINE

## 2022-08-21 PROCEDURE — 87505 NFCT AGENT DETECTION GI: CPT | Performed by: FAMILY MEDICINE

## 2022-08-21 PROCEDURE — 36415 COLL VENOUS BLD VENIPUNCTURE: CPT | Performed by: EMERGENCY MEDICINE

## 2022-08-21 PROCEDURE — 83605 ASSAY OF LACTIC ACID: CPT | Performed by: EMERGENCY MEDICINE

## 2022-08-21 PROCEDURE — C9113 INJ PANTOPRAZOLE SODIUM, VIA: HCPCS | Performed by: FAMILY MEDICINE

## 2022-08-21 PROCEDURE — 83690 ASSAY OF LIPASE: CPT | Performed by: EMERGENCY MEDICINE

## 2022-08-21 RX ORDER — FAMOTIDINE 10 MG/ML
20 INJECTION, SOLUTION INTRAVENOUS ONCE
Status: COMPLETED | OUTPATIENT
Start: 2022-08-21 | End: 2022-08-21

## 2022-08-21 RX ORDER — PHENYTOIN 50 MG/1
100 TABLET, CHEWABLE ORAL ONCE
Status: DISCONTINUED | OUTPATIENT
Start: 2022-08-21 | End: 2022-08-21

## 2022-08-21 RX ORDER — SODIUM CHLORIDE AND POTASSIUM CHLORIDE .9; .15 G/100ML; G/100ML
125 SOLUTION INTRAVENOUS CONTINUOUS
Status: DISCONTINUED | OUTPATIENT
Start: 2022-08-21 | End: 2022-08-22

## 2022-08-21 RX ORDER — HYDROMORPHONE HCL/PF 1 MG/ML
0.5 SYRINGE (ML) INJECTION
Status: DISCONTINUED | OUTPATIENT
Start: 2022-08-21 | End: 2022-08-27 | Stop reason: HOSPADM

## 2022-08-21 RX ORDER — DULOXETIN HYDROCHLORIDE 60 MG/1
60 CAPSULE, DELAYED RELEASE ORAL DAILY
Status: DISCONTINUED | OUTPATIENT
Start: 2022-08-21 | End: 2022-08-27 | Stop reason: HOSPADM

## 2022-08-21 RX ORDER — ACETAMINOPHEN 325 MG/1
650 TABLET ORAL EVERY 6 HOURS PRN
Status: DISCONTINUED | OUTPATIENT
Start: 2022-08-21 | End: 2022-08-27 | Stop reason: HOSPADM

## 2022-08-21 RX ORDER — PANTOPRAZOLE SODIUM 40 MG/10ML
40 INJECTION, POWDER, LYOPHILIZED, FOR SOLUTION INTRAVENOUS EVERY 12 HOURS SCHEDULED
Status: DISCONTINUED | OUTPATIENT
Start: 2022-08-21 | End: 2022-08-27 | Stop reason: HOSPADM

## 2022-08-21 RX ORDER — ONDANSETRON 2 MG/ML
4 INJECTION INTRAMUSCULAR; INTRAVENOUS ONCE
Status: COMPLETED | OUTPATIENT
Start: 2022-08-21 | End: 2022-08-21

## 2022-08-21 RX ORDER — MORPHINE SULFATE 4 MG/ML
4 INJECTION, SOLUTION INTRAMUSCULAR; INTRAVENOUS ONCE
Status: COMPLETED | OUTPATIENT
Start: 2022-08-21 | End: 2022-08-21

## 2022-08-21 RX ORDER — KETOROLAC TROMETHAMINE 30 MG/ML
15 INJECTION, SOLUTION INTRAMUSCULAR; INTRAVENOUS ONCE
Status: COMPLETED | OUTPATIENT
Start: 2022-08-21 | End: 2022-08-21

## 2022-08-21 RX ORDER — METHYLPREDNISOLONE SODIUM SUCCINATE 40 MG/ML
20 INJECTION, POWDER, LYOPHILIZED, FOR SOLUTION INTRAMUSCULAR; INTRAVENOUS EVERY 8 HOURS SCHEDULED
Status: DISCONTINUED | OUTPATIENT
Start: 2022-08-21 | End: 2022-08-22

## 2022-08-21 RX ORDER — ONDANSETRON 2 MG/ML
4 INJECTION INTRAMUSCULAR; INTRAVENOUS EVERY 6 HOURS PRN
Status: DISCONTINUED | OUTPATIENT
Start: 2022-08-21 | End: 2022-08-27 | Stop reason: HOSPADM

## 2022-08-21 RX ADMIN — ONDANSETRON 4 MG: 2 INJECTION INTRAMUSCULAR; INTRAVENOUS at 18:19

## 2022-08-21 RX ADMIN — SODIUM CHLORIDE AND POTASSIUM CHLORIDE 125 ML/HR: .9; .15 SOLUTION INTRAVENOUS at 16:33

## 2022-08-21 RX ADMIN — HYDROMORPHONE HYDROCHLORIDE 0.5 MG: 1 INJECTION, SOLUTION INTRAMUSCULAR; INTRAVENOUS; SUBCUTANEOUS at 16:29

## 2022-08-21 RX ADMIN — HYDROMORPHONE HYDROCHLORIDE 0.5 MG: 1 INJECTION, SOLUTION INTRAMUSCULAR; INTRAVENOUS; SUBCUTANEOUS at 23:17

## 2022-08-21 RX ADMIN — KETOROLAC TROMETHAMINE 15 MG: 30 INJECTION, SOLUTION INTRAMUSCULAR at 11:38

## 2022-08-21 RX ADMIN — Medication 125 MG: at 23:18

## 2022-08-21 RX ADMIN — FAMOTIDINE 20 MG: 10 INJECTION, SOLUTION INTRAVENOUS at 11:40

## 2022-08-21 RX ADMIN — DULOXETINE HYDROCHLORIDE 60 MG: 60 CAPSULE, DELAYED RELEASE ORAL at 16:33

## 2022-08-21 RX ADMIN — Medication 125 MG: at 18:10

## 2022-08-21 RX ADMIN — PIPERACILLIN AND TAZOBACTAM 3.38 G: 36; 4.5 INJECTION, POWDER, FOR SOLUTION INTRAVENOUS at 23:19

## 2022-08-21 RX ADMIN — ONDANSETRON 4 MG: 2 INJECTION INTRAMUSCULAR; INTRAVENOUS at 11:36

## 2022-08-21 RX ADMIN — PANTOPRAZOLE SODIUM 40 MG: 40 INJECTION, POWDER, FOR SOLUTION INTRAVENOUS at 21:05

## 2022-08-21 RX ADMIN — PIPERACILLIN AND TAZOBACTAM 3.38 G: 36; 4.5 INJECTION, POWDER, FOR SOLUTION INTRAVENOUS at 18:10

## 2022-08-21 RX ADMIN — SODIUM CHLORIDE AND POTASSIUM CHLORIDE 125 ML/HR: .9; .15 SOLUTION INTRAVENOUS at 23:35

## 2022-08-21 RX ADMIN — IOHEXOL 65 ML: 350 INJECTION, SOLUTION INTRAVENOUS at 13:07

## 2022-08-21 RX ADMIN — PIPERACILLIN AND TAZOBACTAM 3.38 G: 3; .375 INJECTION, POWDER, LYOPHILIZED, FOR SOLUTION INTRAVENOUS at 12:19

## 2022-08-21 RX ADMIN — MORPHINE SULFATE 4 MG: 4 INJECTION INTRAVENOUS at 12:16

## 2022-08-21 RX ADMIN — SODIUM CHLORIDE 1000 ML: 0.9 INJECTION, SOLUTION INTRAVENOUS at 11:34

## 2022-08-21 RX ADMIN — HYDROMORPHONE HYDROCHLORIDE 0.5 MG: 1 INJECTION, SOLUTION INTRAMUSCULAR; INTRAVENOUS; SUBCUTANEOUS at 19:37

## 2022-08-21 RX ADMIN — METHYLPREDNISOLONE SODIUM SUCCINATE 20 MG: 40 INJECTION, POWDER, FOR SOLUTION INTRAMUSCULAR; INTRAVENOUS at 16:30

## 2022-08-21 RX ADMIN — METHYLPREDNISOLONE SODIUM SUCCINATE 20 MG: 40 INJECTION, POWDER, FOR SOLUTION INTRAMUSCULAR; INTRAVENOUS at 21:05

## 2022-08-21 RX ADMIN — MORPHINE SULFATE 4 MG: 4 INJECTION INTRAVENOUS at 14:31

## 2022-08-21 NOTE — ASSESSMENT & PLAN NOTE
Patient with a previous history of C diff  As noted above, will start patient on oral vancomycin until C diff test comes back

## 2022-08-21 NOTE — PROGRESS NOTES
Julian 43  Progress Note - Jamarcus Moreno 1995, 32 y o  male MRN: [id number]  Unit/Bed#: [address] Encounter: [id number]  Primary Care Provider: Lorena Blanton DO   Date and time admitted to hospital: 8/21/2022 10:07 AM    * Abdominal pain  Assessment & Plan  Patient seen and examined in the emergency department and will be admitted to the hospital for further evaluation and management  Patient presenting with intractable abdominal pain  Patient with a history of ulcerative colitis status post total proctocolectomy with J-pouch ileoanal anastomosis, anastomotic stricture and persistent inflammation as well as questionable pouchitis and possible underlying Crohn's disease  CT scan of the abdomen and pelvis performed in the emergency department shows that the ileal pouch is moderately distended with fluid however there is no obvious inflammation noted  Patient started on IV Zosyn therapy in the emergency department which will be continued at this time  Patient also with complaints of diarrhea at home  Patient with a history of C diff  Will check for C diff at this time and start patient on oral vancomycin until this results  Will also start patient on IV Solu-Medrol 20 mg every 8 hours  Gastroenterology consultation has been placed  Will follow up with their recommendations  Elevated C-reactive protein (CRP)  Assessment & Plan  Patient with an elevated CRP likely secondary to above  History of Clostridium difficile infection  Assessment & Plan  Patient with a previous history of C diff  As noted above, will start patient on oral vancomycin until C diff test comes back  Hypokalemia  Assessment & Plan  Potassium 3 3  Continue with IV fluid hydration with potassium supplementation  Hyponatremia  Assessment & Plan  Patient with evidence of hyponatremia and hypochloremia likely secondary to diarrhea and volume depletion    Continue with normal saline at this time at 125 mL/hour  Will also include potassium supplementation as well as patient is noted to have evidence of hypokalemia with a potassium of 3 3  Leukocytosis  Assessment & Plan  Patient with evidence of leukocytosis with a white blood cell count of 26 61  This could be due to underlying infection and may also be reactive as well  As noted above patient placed on IV Zosyn therapy in addition to oral vancomycin  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[address] )  Assessment & Plan  As noted above  Follow-up with recommendations from Gastroenterology  VTE Pharmacologic Prophylaxis: VTE Score: 2 SCD's  Code Status: Level 1 - Full Code     Anticipated Length of Stay: Patient will be admitted on an inpatient basis with an anticipated length of stay of greater than 2 midnights secondary to abdominal pain  Total Time for Visit, including Counseling / Coordination of Care: 70 minutes Greater than 50% of this total time spent on direct patient counseling and coordination of care  Chief Complaint: Abdominal pain  History of Present Illness:  Larry Howard is a 32 y o  male who presents to the emergency department with complaints abdominal pain that started at 2:00 a m  This morning  Patient with a history of ulcerative colitis status post total proctocolectomy with J-pouch ileoanal anastomosis, anastomotic stricture and persistent inflammation as well as questionable pouchitis and possible underlying Crohn's disease  CT scan of the abdomen and pelvis performed in the emergency department shows that the ileal pouch is moderately distended with fluid however there is no obvious inflammation noted  Patient started on IV Zosyn therapy in the emergency department  Patient also given IV pain control  Patient does report that his pain is improving at this time    Patient currently denies any chest pain, shortness of breath, lightheadedness, dizziness, palpitations, fevers, chills, change in urinary habits, recent travel or any known sick contacts  Review of Systems:  14 point review of systems obtained and reviewed and is negative except as outlined above in the HPI  Past Medical and Surgical History:   Past Medical History:   Diagnosis Date    Ankylosing spondylitis ([facility] )     Anxiety     Bowel obstruction (HCC)     Clostridium difficile colitis 9/13/2018    Colitis     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[facility] ) 9/13/2018    Pancreatitis     Ulcerative colitis ([facility] )        Past Surgical History:   Procedure Laterality Date    COLECTOMY TOTAL      with ileal pouch and anastemosis    IR PICC PLACEMENT SINGLE LUMEN  3/1/2022    TOTAL COLECTOMY         Meds/Allergies:  Prior to Admission medications    Medication Sig Start Date End Date Taking? Authorizing Provider   DULoxetine (CYMBALTA) 60 mg delayed release capsule Take 1 capsule (60 mg total) by mouth daily 6/10/22  Yes Yosvany Selby DO   pantoprazole (PROTONIX) 20 mg tablet Take 1 tablet (20 mg total) by mouth daily  Patient not taking: No sig reported 7/8/22   Aguilar Zheng PA-C   predniSONE 10 mg tablet Take 6 tablets (60 mg total) by mouth daily for 7 days, THEN 5 tablets (50 mg total) daily for 7 days, THEN 4 tablets (40 mg total) daily for 7 days, THEN 3 tablets (30 mg total) daily for 7 days, THEN 2 tablets (20 mg total) daily for 7 days, THEN 1 tablet (10 mg total) daily for 7 days  Patient not taking: Reported on 8/21/2022 7/14/22 8/25/22  Darlin Martinez DO   sulfaSALAzine (AZULFIDINE) 500 mg tablet Take 2 tablets (1,000 mg total) by mouth 2 (two) times a day  Patient not taking: No sig reported 6/1/22   Darlin Martinez DO     Allergies:    Allergies   Allergen Reactions    Cefazolin Hives     Other reaction(s): Arrythmia (Abnormal Heartbeat), Rash Maculopapular    Mesalamine GI Intolerance     Other reaction(s): Pancreatitis  Annotation - [id number]: pancreatitis       Social History:  Marital Status: Single     Social History     Substance and Sexual Activity   Alcohol Use Not Currently    Comment: pt states 5 a month/socially     Social History     Tobacco Use   Smoking Status Never Smoker   Smokeless Tobacco Never Used     Social History     Substance and Sexual Activity   Drug Use No       Family History:  No family history on file  Physical Exam:     Vitals:   Blood Pressure: 124/85 (08/21/22 1540)  Pulse: 89 (08/21/22 1540)  Temperature: 98 1 °F (36 7 °C) (08/21/22 1540)  Temp Source: Oral (08/21/22 1540)  Respirations: 18 (08/21/22 1540)  Height: 5' 9" (175 3 cm) (08/21/22 1540)  Weight - Scale: 81 6 kg (180 lb) (08/21/22 1540)  SpO2: 94 % (08/21/22 1500)    Physical Exam  HENT:      Head: Normocephalic  Mouth/Throat:      Mouth: Mucous membranes are moist    Eyes:      Extraocular Movements: Extraocular movements intact  Cardiovascular:      Rate and Rhythm: Normal rate  Pulmonary:      Effort: Pulmonary effort is normal  No respiratory distress  Abdominal:      Palpations: Abdomen is soft  Tenderness: There is abdominal tenderness  Skin:     General: Skin is warm  Neurological:      Mental Status: He is alert and oriented to person, place, and time  Psychiatric:         Mood and Affect: Mood normal          Behavior: Behavior normal         Additional Data:     Lab Results:  Results from last 7 days   Lab Units 08/21/22  1128   WBC Thousand/uL 26 61*   HEMOGLOBIN g/dL 12 8   HEMATOCRIT % 41 9   PLATELETS Thousands/uL 414*   NEUTROS PCT % 88*   LYMPHS PCT % 4*   MONOS PCT % 7   EOS PCT % 0     Results from last 7 days   Lab Units 08/21/22  1128   SODIUM mmol/L 132*   POTASSIUM mmol/L 3 3*   CHLORIDE mmol/L 92*   CO2 mmol/L 28   BUN mg/dL 12   CREATININE mg/dL 1 27   ANION GAP mmol/L 12   CALCIUM mg/dL 9 4   ALBUMIN g/dL 4 2   TOTAL BILIRUBIN mg/dL 1 63*   ALK PHOS U/L 89   ALT U/L 29   AST U/L 16   GLUCOSE RANDOM mg/dL 92       Results from last 7 days   Lab Units 08/21/22  1128   LACTIC ACID mmol/L 0 8       Imaging: Reviewed    CT abdomen pelvis with contrast   Final Result by Nani Wilder MD (08/21 4340)      Ileal pouch is moderately distended with fluid  No obvious inflammatory changes are seen, however  Workstation performed: [id number]             ** Please Note: This note has been constructed using a voice recognition system   **

## 2022-08-21 NOTE — ED PROVIDER NOTES
History  Chief Complaint   Patient presents with    Abdominal Pain     Abd pain, nausea, vomiting x4, chills since 0200 this AM with no relief  Extensive GI hx per patient     Patient with a prior medical history of small-bowel obstruction, C diff colitis, ileal pouch itis, pancreatitis, ulcerative colitis, ankylosing spondylitis, presents with complaint of mid abdominal pain that began at 2:00 a m , associated with nausea, vomiting and chills  Patient noted to be febrile on arrival   He denies diarrhea or sick contacts  History provided by:  Patient  History limited by:  Acuity of condition   used: No    Abdominal Pain  Pain location:  Periumbilical  Pain quality: aching    Pain radiates to:  Does not radiate  Pain severity:  Moderate  Onset quality:  Sudden  Timing:  Constant  Progression:  Worsening  Chronicity:  New  Relieved by:  Nothing  Worsened by:  Nothing  Ineffective treatments:  None tried  Associated symptoms: chills and vomiting    Associated symptoms: no chest pain, no constipation, no cough, no diarrhea, no dysuria, no fever, no hematuria, no nausea and no shortness of breath    Vomiting:     Quality:  Bilious material    Severity:  Moderate    Timing:  Constant      Prior to Admission Medications   Prescriptions Last Dose Informant Patient Reported? Taking?    DULoxetine (CYMBALTA) 60 mg delayed release capsule 8/20/2022 at Unknown time  No Yes   Sig: Take 1 capsule (60 mg total) by mouth daily   pantoprazole (PROTONIX) 20 mg tablet Not Taking at Unknown time  No No   Sig: Take 1 tablet (20 mg total) by mouth daily   Patient not taking: No sig reported   predniSONE 10 mg tablet Not Taking at Unknown time  No No   Sig: Take 6 tablets (60 mg total) by mouth daily for 7 days, THEN 5 tablets (50 mg total) daily for 7 days, THEN 4 tablets (40 mg total) daily for 7 days, THEN 3 tablets (30 mg total) daily for 7 days, THEN 2 tablets (20 mg total) daily for 7 days, THEN 1 tablet (10 mg total) daily for 7 days  Patient not taking: Reported on 8/21/2022   sulfaSALAzine (AZULFIDINE) 500 mg tablet Not Taking at Unknown time  No No   Sig: Take 2 tablets (1,000 mg total) by mouth 2 (two) times a day   Patient not taking: No sig reported      Facility-Administered Medications: None       Past Medical History:   Diagnosis Date    [facility]yl[facility]yl[facility] ([facility] )     Anxiety     Bowel obstruction (HCC)     Clostridium difficile colitis 9/13/2018    Colitis     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[facility] ) 9/13/2018    Pancreatitis     Ulcerative colitis ([place] [address] )        Past Surgical History:   Procedure Laterality Date    COLECTOMY TOTAL      with ileal pouch and anastemosis    IR PICC PLACEMENT SINGLE LUMEN  3/1/2022    TOTAL COLECTOMY         No family history on file  I have reviewed and agree with the history as documented  E-Cigarette/Vaping    E-Cigarette Use Never User      E-Cigarette/Vaping Substances    Nicotine No     THC No     CBD No     Flavoring No     Other No     Unknown No      Social History     Tobacco Use    Smoking status: Never Smoker    Smokeless tobacco: Never Used   Vaping Use    Vaping Use: Never used   Substance Use Topics    Alcohol use: Not Currently     Comment: pt states 5 a month/socially    Drug use: No       Review of Systems   Constitutional: Positive for chills  Negative for fever  Respiratory: Negative for cough, shortness of breath and wheezing  Cardiovascular: Negative for chest pain and palpitations  Gastrointestinal: Positive for abdominal pain and vomiting  Negative for constipation, diarrhea and nausea  Genitourinary: Negative for dysuria, flank pain, hematuria and urgency  Musculoskeletal: Negative for back pain  Skin: Negative for color change and rash  All other systems reviewed and are negative  Physical Exam  Physical Exam  Vitals and nursing note reviewed  Constitutional:       Appearance: He is well-developed     HENT:      Head: Normocephalic and atraumatic  Eyes:      Pupils: Pupils are equal, round, and reactive to light  Cardiovascular:      Rate and Rhythm: Normal rate and regular rhythm  Heart sounds: Normal heart sounds  Pulmonary:      Effort: Pulmonary effort is normal       Breath sounds: Normal breath sounds  Abdominal:      General: Abdomen is flat  Bowel sounds are normal  There is no distension  Palpations: Abdomen is soft  There is no mass  Tenderness: There is generalized abdominal tenderness  There is no guarding or rebound  Skin:     General: Skin is warm and dry  Capillary Refill: Capillary refill takes less than 2 seconds  Neurological:      General: No focal deficit present  Mental Status: He is alert and oriented to person, place, and time  Psychiatric:         Mood and Affect: Affect is flat  Behavior: Behavior normal          Thought Content:  Thought content normal          Judgment: Judgment normal          Vital Signs  ED Triage Vitals   Temperature Pulse Respirations Blood Pressure SpO2   08/21/22 1013 08/21/22 1013 08/21/22 1013 08/21/22 1013 08/21/22 1013   (!) 100 9 °F (38 3 °C) (!) 112 18 125/71 96 %      Temp Source Heart Rate Source Patient Position - Orthostatic VS BP Location FiO2 (%)   08/21/22 1013 08/21/22 1013 08/21/22 1013 08/21/22 1013 --   Tympanic Monitor Sitting Right arm       Pain Score       08/21/22 1036       10 - Worst Possible Pain           Vitals:    08/21/22 1445 08/21/22 1500 08/21/22 1528 08/21/22 1540   BP: 148/77 117/56 124/85 124/85   Pulse: 90 88  89   Patient Position - Orthostatic VS:             Visual Acuity      ED Medications  Medications   DULoxetine (CYMBALTA) delayed release capsule 60 mg (60 mg Oral Given 8/21/22 1633)   sodium chloride 0 9 % with KCl 20 mEq/L infusion (premix) (125 mL/hr Intravenous New Bag 8/21/22 1633)   piperacillin-tazobactam (ZOSYN) 3 375 g in sodium chloride 0 9 % 100 mL IVPB (has no administration in time range)   methylPREDNISolone sodium succinate (Solu-MEDROL) injection 20 mg (20 mg Intravenous Given 8/21/22 1630)   pantoprazole (PROTONIX) injection 40 mg (has no administration in time range)   HYDROmorphone (DILAUDID) injection 0 5 mg (0 5 mg Intravenous Given 8/21/22 1629)   acetaminophen (TYLENOL) tablet 650 mg (has no administration in time range)   vancomycin (VANCOCIN) oral solution 125 mg (has no administration in time range)   ondansetron (ZOFRAN) injection 4 mg (has no administration in time range)   sodium chloride 0 9 % bolus 1,000 mL (0 mL Intravenous Stopped 8/21/22 1438)   ondansetron (ZOFRAN) injection 4 mg (4 mg Intravenous Given 8/21/22 1136)   ketorolac (TORADOL) injection 15 mg (15 mg Intravenous Given 8/21/22 1138)   Famotidine (PF) (PEPCID) injection 20 mg (20 mg Intravenous Given 8/21/22 1140)   piperacillin-tazobactam (ZOSYN) IVPB 3 375 g (0 g Intravenous Stopped 8/21/22 1259)   morphine injection 4 mg (4 mg Intravenous Given 8/21/22 1216)   iohexol (OMNIPAQUE) 350 MG/ML injection (MULTI-DOSE) 65 mL (65 mL Intravenous Given 8/21/22 1307)   morphine injection 4 mg (4 mg Intravenous Given 8/21/22 1431)       Diagnostic Studies  Results Reviewed     Procedure Component Value Units Date/Time    C-reactive protein [[id number]]  (Abnormal) Collected: 08/21/22 1128    Lab Status: Final result Specimen: Blood from Arm, Right Updated: 08/21/22 1506     CRP 89 0 mg/L     Urine Microscopic [[id number]]  (Normal) Collected: 08/21/22 1303    Lab Status: Final result Specimen: Urine, Clean Catch Updated: 08/21/22 1407     RBC, UA 2-4 /hpf      WBC, UA 2-4 /hpf      Epithelial Cells None Seen /hpf      Bacteria, UA Occasional /hpf     UA (URINE) with reflex to Scope [[id number]]  (Abnormal) Collected: 08/21/22 1303    Lab Status: Final result Specimen: Urine, Clean Catch Updated: 08/21/22 1312     Color, UA Yellow     Clarity, UA Clear     Specific Gravity, UA 1 025     pH, UA 6 0     Leukocytes, UA Negative     Nitrite, UA Negative     Protein, UA 30 (1+) mg/dl      Glucose, UA Negative mg/dl      Ketones, UA 40 (2+) mg/dl      Urobilinogen, UA 0 2 E U /dl      Bilirubin, UA Interference- unable to analyze     Occult Blood, UA Small    COVID only [[id number]]  (Normal) Collected: 08/21/22 1128    Lab Status: Final result Specimen: Nares from Nose Updated: 08/21/22 1234     SARS-CoV-2 Negative    Narrative:      FOR PEDIATRIC PATIENTS - copy/paste COVID Guidelines URL to browser: [email or web address]    SARS-CoV-2 assay is a Nucleic Acid Amplification assay intended for the  qualitative detection of nucleic acid from SARS-CoV-2 in nasopharyngeal  swabs  Results are for the presumptive identification of SARS-CoV-2 RNA  Positive results are indicative of infection with SARS-CoV-2, the virus  causing COVID-19, but do not rule out bacterial infection or co-infection  with other viruses  Laboratories within the [facility] and its  territories are required to report all positive results to the appropriate  public health authorities  Negative results do not preclude SARS-CoV-2  infection and should not be used as the sole basis for treatment or other  patient management decisions  Negative results must be combined with  clinical observations, patient history, and epidemiological information  This test has not been FDA cleared or approved  This test has been authorized by FDA under an Emergency Use Authorization  (EUA)  This test is only authorized for the duration of time the  declaration that circumstances exist justifying the authorization of the  emergency use of an in vitro diagnostic tests for detection of SARS-CoV-2  virus and/or diagnosis of COVID-19 infection under section 564(b)(1) of  the Act, 21 U  S C  429YHL-9(J)(1), unless the authorization is terminated  or revoked sooner   The test has been validated but independent review by FDA  and CLIA is pending  Test performed using [employer]: This RT-PCR assay targets N2,  a region unique to SARS-CoV-2  A conserved region in the E-gene was chosen  for pan-Sarbecovirus detection which includes SARS-CoV-2  Comprehensive metabolic panel [[id number]]  (Abnormal) Collected: 08/21/22 1128    Lab Status: Final result Specimen: Blood from Arm, Right Updated: 08/21/22 1208     Sodium 132 mmol/L      Potassium 3 3 mmol/L      Chloride 92 mmol/L      CO2 28 mmol/L      ANION GAP 12 mmol/L      BUN 12 mg/dL      Creatinine 1 27 mg/dL      Glucose 92 mg/dL      Calcium 9 4 mg/dL      AST 16 U/L      ALT 29 U/L      Alkaline Phosphatase 89 U/L      Total Protein 8 2 g/dL      Albumin 4 2 g/dL      Total Bilirubin 1 63 mg/dL      eGFR 75 ml/min/1 73sq m     Narrative:      [place] guidelines for Chronic Kidney Disease (CKD):     Stage 1 with normal or high GFR (GFR > 90 mL/min/1 73 square meters)    Stage 2 Mild CKD (GFR = 60-89 mL/min/1 73 square meters)    Stage 3A Moderate CKD (GFR = 45-59 mL/min/1 73 square meters)    Stage 3B Moderate CKD (GFR = 30-44 mL/min/1 73 square meters)    Stage 4 Severe CKD (GFR = 15-29 mL/min/1 73 square meters)    Stage 5 End Stage CKD (GFR <15 mL/min/1 73 square meters)  Note: GFR calculation is accurate only with a steady state creatinine    Lipase [[id number]]  (Abnormal) Collected: 08/21/22 1128    Lab Status: Final result Specimen: Blood from Arm, Right Updated: 08/21/22 1208     Lipase 60 u/L     Lactic acid [[id number]]  (Normal) Collected: 08/21/22 1128    Lab Status: Final result Specimen: Blood from Arm, Right Updated: 08/21/22 1203     LACTIC ACID 0 8 mmol/L     Narrative:      Result may be elevated if tourniquet was used during collection      CBC and differential [[id number]]  (Abnormal) Collected: 08/21/22 1128    Lab Status: Final result Specimen: Blood from Arm, Right Updated: 08/21/22 1140     WBC 26 61 Thousand/uL      RBC 6 68 Million/uL      Hemoglobin 12 8 g/dL      Hematocrit 41 9 %      MCV 63 fL      MCH 19 2 pg      MCHC 30 5 g/dL      RDW 18 7 %      MPV 9 1 fL      Platelets 184 Thousands/uL      nRBC 0 /100 WBCs      Neutrophils Relative 88 %      Immat GRANS % 1 %      Lymphocytes Relative 4 %      Monocytes Relative 7 %      Eosinophils Relative 0 %      Basophils Relative 0 %      Neutrophils Absolute 23 31 Thousands/µL      Immature Grans Absolute 0 15 Thousand/uL      Lymphocytes Absolute 1 11 Thousands/µL      Monocytes Absolute 1 93 Thousand/µL      Eosinophils Absolute 0 01 Thousand/µL      Basophils Absolute 0 10 Thousands/µL     Blood culture #2 [[id number]] Collected: 08/21/22 1128    Lab Status: In process Specimen: Blood from Arm, Left Updated: 08/21/22 1137    Blood culture #1 [[id number]] Collected: 08/21/22 1128    Lab Status: In process Specimen: Blood from Arm, Right Updated: 08/21/22 1137                 CT abdomen pelvis with contrast   Final Result by Rah May MD (08/21 1403)      Ileal pouch is moderately distended with fluid  No obvious inflammatory changes are seen, however  Workstation performed: [id number]                    Procedures  Procedures         ED Course  ED Course as of 08/21/22 1656   Sun Aug 21, 2022   1422 CT report reviewed, no obstruction noted  Pain persists  Will admit for intractable pain  SBIRT 22yo+    Flowsheet Row Most Recent Value   SBIRT (23 yo +)    In order to provide better care to our patients, we are screening all of our patients for alcohol and drug use  Would it be okay to ask you these screening questions?  No Filed at: 08/21/2022 1025                    MDM  Number of Diagnoses or Management Options  Intractable abdominal pain: new and requires workup     Amount and/or Complexity of Data Reviewed  Clinical lab tests: ordered and reviewed  Tests in the radiology section of CPT®: ordered and reviewed    Risk of Complications, Morbidity, and/or Mortality  Presenting problems: high  Diagnostic procedures: high  Management options: high    Patient Progress  Patient progress: stable      Disposition  Final diagnoses:   Intractable abdominal pain     Time reflects when diagnosis was documented in both MDM as applicable and the Disposition within this note     Time User Action Codes Description Comment    8/21/2022  2:37 PM Asher Cordero Add [R10 9] Intractable abdominal pain       ED Disposition     ED Disposition   Admit    Condition   Stable    Date/Time   Sun Aug 21, 2022  2:37 PM    Comment   Case was discussed with Dr Alexey Sun and the patient's admission status was agreed to be Admission Status: observation status to the service of Dr Delmis Harrington   Follow-up Information    None         Current Discharge Medication List      CONTINUE these medications which have NOT CHANGED    Details   DULoxetine (CYMBALTA) 60 mg delayed release capsule Take 1 capsule (60 mg total) by mouth daily  Qty: 90 capsule, Refills: 3    Associated Diagnoses: CAR (generalized anxiety disorder)      pantoprazole (PROTONIX) 20 mg tablet Take 1 tablet (20 mg total) by mouth daily  Qty: 28 tablet, Refills: 0    Associated Diagnoses: Epigastric abdominal pain      predniSONE 10 mg tablet Take 6 tablets (60 mg total) by mouth daily for 7 days, THEN 5 tablets (50 mg total) daily for 7 days, THEN 4 tablets (40 mg total) daily for 7 days, THEN 3 tablets (30 mg total) daily for 7 days, THEN 2 tablets (20 mg total) daily for 7 days, THEN 1 tablet (10 mg total) daily for 7 days  Qty: 147 tablet, Refills: 0    Associated Diagnoses: 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HCC)      sulfaSALAzine (AZULFIDINE) 500 mg tablet Take 2 tablets (1,000 mg total) by mouth 2 (two) times a day  Refills: 0    Associated Diagnoses: Ankylosing spondylitis ([facility] )             No discharge procedures on file      PDMP Review       Value Time User    PDMP Reviewed  Yes 7/14/2022  4:49 PM Aidee Gay DO          ED Provider  Electronically Signed by           Zainab De DO  08/21/22 5763

## 2022-08-21 NOTE — H&P
[address]  Progress Note - Mio Rapp 1995, 32 y o  male MRN: [id number]  Unit/Bed#: [address] [facility],[address] Floor Encounter: [id number]  Primary Care Provider: Obi Solis DO   Date and time admitted to hospital: 8/21/2022 10:07 AM    * Abdominal pain  Assessment & Plan  Patient seen and examined in the emergency department and will be admitted to the hospital for further evaluation and management  Patient presenting with intractable abdominal pain  Patient with a history of ulcerative colitis status post total proctocolectomy with J-pouch ileoanal anastomosis, anastomotic stricture and persistent inflammation as well as questionable pouchitis and possible underlying Crohn's disease  CT scan of the abdomen and pelvis performed in the emergency department shows that the ileal pouch is moderately distended with fluid however there is no obvious inflammation noted  Patient started on IV Zosyn therapy in the emergency department which will be continued at this time  Patient also with complaints of diarrhea at home  Patient with a history of C diff  Will check for C diff at this time and start patient on oral vancomycin until this results  Will also start patient on IV Solu-Medrol 20 mg every 8 hours  Gastroenterology consultation has been placed  Will follow up with their recommendations  Elevated C-reactive protein (CRP)  Assessment & Plan  Patient with an elevated CRP likely secondary to above  History of Clostridium difficile infection  Assessment & Plan  Patient with a previous history of C diff  As noted above, will start patient on oral vancomycin until C diff test comes back  Hypokalemia  Assessment & Plan  Potassium 3 3  Continue with IV fluid hydration with potassium supplementation  Hyponatremia  Assessment & Plan  Patient with evidence of hyponatremia and hypochloremia likely secondary to diarrhea and volume depletion    Continue with normal saline at this time at 125 mL/hour  Will also include potassium supplementation as well as patient is noted to have evidence of hypokalemia with a potassium of 3 3  Leukocytosis  Assessment & Plan  Patient with evidence of leukocytosis with a white blood cell count of 26 61  This could be due to underlying infection and may also be reactive as well  As noted above patient placed on IV Zosyn therapy in addition to oral vancomycin  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[address] )  Assessment & Plan  As noted above  Follow-up with recommendations from Gastroenterology  VTE Pharmacologic Prophylaxis: VTE Score: 2 SCD's  Code Status: Level 1 - Full Code      Anticipated Length of Stay: Patient will be admitted on an inpatient basis with an anticipated length of stay of greater than 2 midnights secondary to abdominal pain      Total Time for Visit, including Counseling / Coordination of Care: 70 minutes Greater than 50% of this total time spent on direct patient counseling and coordination of care      Chief Complaint: Abdominal pain      History of Present Illness:  Mckenna Fatima is a 32 y o  male who presents to the emergency department with complaints abdominal pain that started at 2:00 a m  This morning  Patient with a history of ulcerative colitis status post total proctocolectomy with J-pouch ileoanal anastomosis, anastomotic stricture and persistent inflammation as well as questionable pouchitis and possible underlying Crohn's disease  CT scan of the abdomen and pelvis performed in the emergency department shows that the ileal pouch is moderately distended with fluid however there is no obvious inflammation noted  Patient started on IV Zosyn therapy in the emergency department  Patient also given IV pain control  Patient does report that his pain is improving at this time    Patient currently denies any chest pain, shortness of breath, lightheadedness, dizziness, palpitations, fevers, chills, change in urinary habits, recent travel or any known sick contacts      Review of Systems:  14 point review of systems obtained and reviewed and is negative except as outlined above in the HPI  Past Medical and Surgical History:   Past Medical History:   Diagnosis Date    [facility] ([email or web address]     Anxiety     Bowel obstruction (HCC)     Clostridium difficile colitis 9/13/2018    Colitis     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[email or web address] ) 9/13/2018    Pancreatitis     Ulcerative colitis ([email or web address] )        Past Surgical History:   Procedure Laterality Date    COLECTOMY TOTAL      with ileal pouch and anastemosis    IR PICC PLACEMENT SINGLE LUMEN  3/1/2022    TOTAL COLECTOMY         Meds/Allergies:  Prior to Admission medications    Medication Sig Start Date End Date Taking? Authorizing Provider   DULoxetine (CYMBALTA) 60 mg delayed release capsule Take 1 capsule (60 mg total) by mouth daily 6/10/22  Yes Jeanna Parikh DO   pantoprazole (PROTONIX) 20 mg tablet Take 1 tablet (20 mg total) by mouth daily  Patient not taking: No sig reported 7/8/22   Sofia Saavedra PA-C   predniSONE 10 mg tablet Take 6 tablets (60 mg total) by mouth daily for 7 days, THEN 5 tablets (50 mg total) daily for 7 days, THEN 4 tablets (40 mg total) daily for 7 days, THEN 3 tablets (30 mg total) daily for 7 days, THEN 2 tablets (20 mg total) daily for 7 days, THEN 1 tablet (10 mg total) daily for 7 days  Patient not taking: Reported on 8/21/2022 7/14/22 8/25/22  Darlin Martinez DO   sulfaSALAzine (AZULFIDINE) 500 mg tablet Take 2 tablets (1,000 mg total) by mouth 2 (two) times a day  Patient not taking: No sig reported 6/1/22   Darlin Martinez DO       Allergies:    Allergies   Allergen Reactions    Cefazolin Hives     Other reaction(s): Arrythmia (Abnormal Heartbeat), Rash Maculopapular    Mesalamine GI Intolerance     Other reaction(s): Pancreatitis  [facility] - [id number]: pancreatitis       Social History:  Marital Status: Single     Social History     Substance and Sexual Activity Alcohol Use Not Currently    Comment: pt states 5 a month/socially     Social History     Tobacco Use   Smoking Status Never Smoker   Smokeless Tobacco Never Used     Social History     Substance and Sexual Activity   Drug Use No       Family History:  No family history on file  Physical Exam:     Vitals:   Blood Pressure: 124/85 (08/21/22 1540)  Pulse: 89 (08/21/22 1540)  Temperature: 98 1 °F (36 7 °C) (08/21/22 1540)  Temp Source: Oral (08/21/22 1540)  Respirations: 18 (08/21/22 1540)  Height: 5' 9" (175 3 cm) (08/21/22 1540)  Weight - Scale: 81 6 kg (180 lb) (08/21/22 1540)  SpO2: 94 % (08/21/22 1500)    Physical Exam  HENT:      Head: Normocephalic  Mouth/Throat:      Mouth: Mucous membranes are dry  Eyes:      Extraocular Movements: Extraocular movements intact  Cardiovascular:      Rate and Rhythm: Normal rate  Pulmonary:      Effort: Pulmonary effort is normal  No respiratory distress  Abdominal:      Palpations: Abdomen is soft  Tenderness: There is abdominal tenderness  Skin:     General: Skin is warm  Neurological:      Mental Status: He is alert and oriented to person, place, and time  Psychiatric:         Mood and Affect: Mood normal          Behavior: Behavior normal         Additional Data:     Lab Results:  Results from last 7 days   Lab Units 08/21/22  1128   WBC Thousand/uL 26 61*   HEMOGLOBIN g/dL 12 8   HEMATOCRIT % 41 9   PLATELETS Thousands/uL 414*   NEUTROS PCT % 88*   LYMPHS PCT % 4*   MONOS PCT % 7   EOS PCT % 0     Results from last 7 days   Lab Units 08/21/22  1128   SODIUM mmol/L 132*   POTASSIUM mmol/L 3 3*   CHLORIDE mmol/L 92*   CO2 mmol/L 28   BUN mg/dL 12   CREATININE mg/dL 1 27   ANION GAP mmol/L 12   CALCIUM mg/dL 9 4   ALBUMIN g/dL 4 2   TOTAL BILIRUBIN mg/dL 1 63*   ALK PHOS U/L 89   ALT U/L 29   AST U/L 16   GLUCOSE RANDOM mg/dL 92       Results from last 7 days   Lab Units 08/21/22  1128   LACTIC ACID mmol/L 0 8       Imaging: Reviewed    CT abdomen pelvis with contrast   Final Result by Dudley Canchola MD (08/21 7395)      Ileal pouch is moderately distended with fluid  No obvious inflammatory changes are seen, however  Workstation performed: [id number]             ** Please Note: This note has been constructed using a voice recognition system   **

## 2022-08-21 NOTE — ED NOTES
Pt stated he has an increased pain level again; Dr Herman Galvez aware; awaiting CT results     George Solomon RN  08/21/22 9091

## 2022-08-21 NOTE — PLAN OF CARE
Problem: Potential for Falls  Goal: Patient will remain free of falls  Description: INTERVENTIONS:  - Educate patient on call bell use  - Educate patient/family on patient safety including physical limitations  - Instruct patient to call for assistance with activity   - Consult OT/PT to assist with strengthening/mobility   - Keep Call bell within reach  - Keep bed low and locked with side rails adjusted as appropriate  - Keep care items and personal belongings within reach  - Initiate and maintain comfort rounds  - Make Fall Risk Sign visible to staff  - Apply yellow socks and bracelet for high fall risk patients  - Consider moving patient to room near nurses station  Outcome: Progressing     Problem: PAIN - ADULT  Goal: Verbalizes/displays adequate comfort level or baseline comfort level  Description: Interventions:  - Encourage patient to monitor pain and request assistance  - Assess pain using appropriate pain scale  - Administer analgesics based on type and severity of pain and evaluate response  - Implement non-pharmacological measures as appropriate and evaluate response  - Consider cultural and social influences on pain and pain management  - Notify physician/advanced practitioner if interventions unsuccessful or patient reports new pain  Outcome: Progressing     Problem: SAFETY ADULT  Goal: Patient will remain free of falls  Description: INTERVENTIONS:  - Educate patient/family on patient safety including physical limitations  - Instruct patient to call for assistance with activity   - Consult OT/PT to assist with strengthening/mobility   - Keep Call bell within reach  - Keep bed low and locked with side rails adjusted as appropriate  - Keep care items and personal belongings within reach  - Initiate and maintain comfort rounds  - Make Fall Risk Sign visible to staff  - Offer Toileting every 2 Hours, in advance of need  - Initiate/Maintain bed alarm  - Obtain necessary fall risk management equipment  - Apply yellow socks and bracelet for high fall risk patients  - Consider moving patient to room near nurses station  Outcome: Progressing

## 2022-08-21 NOTE — ASSESSMENT & PLAN NOTE
Patient with evidence of leukocytosis with a white blood cell count of 26 61  This could be due to underlying infection and may also be reactive as well  As noted above patient placed on IV Zosyn therapy in addition to oral vancomycin

## 2022-08-21 NOTE — ASSESSMENT & PLAN NOTE
Patient seen and examined in the emergency department and will be admitted to the hospital for further evaluation and management  Patient presenting with intractable abdominal pain  Patient with a history of ulcerative colitis status post total proctocolectomy with J-pouch ileoanal anastomosis, anastomotic stricture and persistent inflammation as well as questionable pouchitis and possible underlying Crohn's disease  CT scan of the abdomen and pelvis performed in the emergency department shows that the ileal pouch is moderately distended with fluid however there is no obvious inflammation noted  Patient started on IV Zosyn therapy in the emergency department which will be continued at this time  Patient also with complaints of diarrhea at home  Patient with a history of C diff  Will check for C diff at this time and start patient on oral vancomycin until this results  Will also start patient on IV Solu-Medrol 20 mg every 8 hours  Gastroenterology consultation has been placed  Will follow up with their recommendations

## 2022-08-21 NOTE — ASSESSMENT & PLAN NOTE
Patient with evidence of hyponatremia and hypochloremia likely secondary to diarrhea and volume depletion  Continue with normal saline at this time at 125 mL/hour  Will also include potassium supplementation as well as patient is noted to have evidence of hypokalemia with a potassium of 3 3

## 2022-08-22 LAB
ALBUMIN SERPL BCP-MCNC: 3.5 G/DL (ref 3.5–5)
ALP SERPL-CCNC: 76 U/L (ref 46–116)
ALT SERPL W P-5'-P-CCNC: 21 U/L (ref 12–78)
ANION GAP SERPL CALCULATED.3IONS-SCNC: 9 MMOL/L (ref 4–13)
AST SERPL W P-5'-P-CCNC: 10 U/L (ref 5–45)
BASOPHILS # BLD AUTO: 0.02 THOUSANDS/ΜL (ref 0–0.1)
BASOPHILS NFR BLD AUTO: 0 % (ref 0–1)
BILIRUB SERPL-MCNC: 0.72 MG/DL (ref 0.2–1)
BUN SERPL-MCNC: 12 MG/DL (ref 5–25)
C DIFF TOX GENS STL QL NAA+PROBE: NEGATIVE
CALCIUM SERPL-MCNC: 9 MG/DL (ref 8.3–10.1)
CAMPYLOBACTER DNA SPEC NAA+PROBE: NORMAL
CHLORIDE SERPL-SCNC: 99 MMOL/L (ref 96–108)
CO2 SERPL-SCNC: 26 MMOL/L (ref 21–32)
CREAT SERPL-MCNC: 1.09 MG/DL (ref 0.6–1.3)
CRP SERPL QL: 180.7 MG/L
EOSINOPHIL # BLD AUTO: 0 THOUSAND/ΜL (ref 0–0.61)
EOSINOPHIL NFR BLD AUTO: 0 % (ref 0–6)
ERYTHROCYTE [DISTWIDTH] IN BLOOD BY AUTOMATED COUNT: 18 % (ref 11.6–15.1)
GFR SERPL CREATININE-BSD FRML MDRD: 91 ML/MIN/1.73SQ M
GLUCOSE SERPL-MCNC: 135 MG/DL (ref 65–140)
HCT VFR BLD AUTO: 37.4 % (ref 36.5–49.3)
HGB BLD-MCNC: 11.2 G/DL (ref 12–17)
IMM GRANULOCYTES # BLD AUTO: 0.09 THOUSAND/UL (ref 0–0.2)
IMM GRANULOCYTES NFR BLD AUTO: 1 % (ref 0–2)
LYMPHOCYTES # BLD AUTO: 0.56 THOUSANDS/ΜL (ref 0.6–4.47)
LYMPHOCYTES NFR BLD AUTO: 3 % (ref 14–44)
MAGNESIUM SERPL-MCNC: 2.3 MG/DL (ref 1.6–2.6)
MCH RBC QN AUTO: 19.1 PG (ref 26.8–34.3)
MCHC RBC AUTO-ENTMCNC: 29.9 G/DL (ref 31.4–37.4)
MCV RBC AUTO: 64 FL (ref 82–98)
MONOCYTES # BLD AUTO: 0.36 THOUSAND/ΜL (ref 0.17–1.22)
MONOCYTES NFR BLD AUTO: 2 % (ref 4–12)
NEUTROPHILS # BLD AUTO: 18.08 THOUSANDS/ΜL (ref 1.85–7.62)
NEUTS SEG NFR BLD AUTO: 94 % (ref 43–75)
NRBC BLD AUTO-RTO: 0 /100 WBCS
PLATELET # BLD AUTO: 316 THOUSANDS/UL (ref 149–390)
PMV BLD AUTO: 8.6 FL (ref 8.9–12.7)
POTASSIUM SERPL-SCNC: 4.6 MMOL/L (ref 3.5–5.3)
PROT SERPL-MCNC: 7.4 G/DL (ref 6.4–8.4)
RBC # BLD AUTO: 5.86 MILLION/UL (ref 3.88–5.62)
SALMONELLA DNA SPEC QL NAA+PROBE: NORMAL
SHIGA TOXIN STX GENE SPEC NAA+PROBE: NORMAL
SHIGELLA DNA SPEC QL NAA+PROBE: NORMAL
SODIUM SERPL-SCNC: 134 MMOL/L (ref 135–147)
WBC # BLD AUTO: 19.11 THOUSAND/UL (ref 4.31–10.16)

## 2022-08-22 PROCEDURE — 83735 ASSAY OF MAGNESIUM: CPT | Performed by: FAMILY MEDICINE

## 2022-08-22 PROCEDURE — 86140 C-REACTIVE PROTEIN: CPT | Performed by: FAMILY MEDICINE

## 2022-08-22 PROCEDURE — 80053 COMPREHEN METABOLIC PANEL: CPT | Performed by: FAMILY MEDICINE

## 2022-08-22 PROCEDURE — 99222 1ST HOSP IP/OBS MODERATE 55: CPT | Performed by: INTERNAL MEDICINE

## 2022-08-22 PROCEDURE — 85025 COMPLETE CBC W/AUTO DIFF WBC: CPT | Performed by: FAMILY MEDICINE

## 2022-08-22 PROCEDURE — C9113 INJ PANTOPRAZOLE SODIUM, VIA: HCPCS | Performed by: FAMILY MEDICINE

## 2022-08-22 PROCEDURE — 99225 PR SBSQ OBSERVATION CARE/DAY 25 MINUTES: CPT | Performed by: FAMILY MEDICINE

## 2022-08-22 RX ORDER — SODIUM CHLORIDE 9 MG/ML
75 INJECTION, SOLUTION INTRAVENOUS CONTINUOUS
Status: DISCONTINUED | OUTPATIENT
Start: 2022-08-22 | End: 2022-08-27 | Stop reason: HOSPADM

## 2022-08-22 RX ADMIN — PANTOPRAZOLE SODIUM 40 MG: 40 INJECTION, POWDER, FOR SOLUTION INTRAVENOUS at 08:12

## 2022-08-22 RX ADMIN — HYDROMORPHONE HYDROCHLORIDE 0.5 MG: 1 INJECTION, SOLUTION INTRAMUSCULAR; INTRAVENOUS; SUBCUTANEOUS at 22:26

## 2022-08-22 RX ADMIN — DULOXETINE HYDROCHLORIDE 60 MG: 60 CAPSULE, DELAYED RELEASE ORAL at 08:13

## 2022-08-22 RX ADMIN — HYDROMORPHONE HYDROCHLORIDE 0.5 MG: 1 INJECTION, SOLUTION INTRAMUSCULAR; INTRAVENOUS; SUBCUTANEOUS at 12:13

## 2022-08-22 RX ADMIN — HYDROMORPHONE HYDROCHLORIDE 0.5 MG: 1 INJECTION, SOLUTION INTRAMUSCULAR; INTRAVENOUS; SUBCUTANEOUS at 19:22

## 2022-08-22 RX ADMIN — SODIUM CHLORIDE 75 ML/HR: 0.9 INJECTION, SOLUTION INTRAVENOUS at 09:47

## 2022-08-22 RX ADMIN — HYDROMORPHONE HYDROCHLORIDE 0.5 MG: 1 INJECTION, SOLUTION INTRAMUSCULAR; INTRAVENOUS; SUBCUTANEOUS at 02:38

## 2022-08-22 RX ADMIN — HYDROMORPHONE HYDROCHLORIDE 0.5 MG: 1 INJECTION, SOLUTION INTRAMUSCULAR; INTRAVENOUS; SUBCUTANEOUS at 05:38

## 2022-08-22 RX ADMIN — PIPERACILLIN AND TAZOBACTAM 3.38 G: 36; 4.5 INJECTION, POWDER, FOR SOLUTION INTRAVENOUS at 05:35

## 2022-08-22 RX ADMIN — METHYLPREDNISOLONE SODIUM SUCCINATE 20 MG: 40 INJECTION, POWDER, FOR SOLUTION INTRAMUSCULAR; INTRAVENOUS at 05:35

## 2022-08-22 RX ADMIN — METHYLPREDNISOLONE SODIUM SUCCINATE 20 MG: 40 INJECTION, POWDER, FOR SOLUTION INTRAMUSCULAR; INTRAVENOUS at 13:41

## 2022-08-22 RX ADMIN — HYDROMORPHONE HYDROCHLORIDE 0.5 MG: 1 INJECTION, SOLUTION INTRAMUSCULAR; INTRAVENOUS; SUBCUTANEOUS at 16:03

## 2022-08-22 RX ADMIN — PANTOPRAZOLE SODIUM 40 MG: 40 INJECTION, POWDER, FOR SOLUTION INTRAVENOUS at 21:19

## 2022-08-22 RX ADMIN — PIPERACILLIN AND TAZOBACTAM 3.38 G: 36; 4.5 INJECTION, POWDER, FOR SOLUTION INTRAVENOUS at 23:23

## 2022-08-22 RX ADMIN — Medication 125 MG: at 12:08

## 2022-08-22 RX ADMIN — PIPERACILLIN AND TAZOBACTAM 3.38 G: 36; 4.5 INJECTION, POWDER, FOR SOLUTION INTRAVENOUS at 17:38

## 2022-08-22 RX ADMIN — HYDROMORPHONE HYDROCHLORIDE 0.5 MG: 1 INJECTION, SOLUTION INTRAMUSCULAR; INTRAVENOUS; SUBCUTANEOUS at 08:56

## 2022-08-22 RX ADMIN — Medication 125 MG: at 05:36

## 2022-08-22 RX ADMIN — Medication 125 MG: at 21:19

## 2022-08-22 RX ADMIN — PIPERACILLIN AND TAZOBACTAM 3.38 G: 36; 4.5 INJECTION, POWDER, FOR SOLUTION INTRAVENOUS at 12:08

## 2022-08-22 NOTE — CONSULTS
Consultation -[facility]   [facility] 32 y o  male MRN: [id number]    Unit/Bed#: [address] [place] [address] Encounter: [id number]      Physician Requesting Consult: Dr Quinton Bone     Reason for Consult / Principal Problem:  Intractable abdominal pain    HPI:    This is a 32 y o  male who presents to the emergency department with complaints abdominal pain  Patient has history of ulcerative colitis status post total proctocolectomy with J-pouch ileoanal anastomosis, anastomotic stricture and persistent inflammation as well as questionable pouchitis and possible underlying Crohn's disease   CT scan of the abdomen and pelvis performed in the emergency department shows that the ileal pouch is moderately distended with fluid however there is no obvious inflammation noted   Patient started on IV Zosyn therapy in the emergency department  Patient with recurrent admissions and was last seen by us in July  Patient was treated with antibiotics at that time as well as Solu-Medrol  Patient follows with surgeon in [place] and was considering revision  Patient did have flex sig at the end of May which had shown  Ulcerated area distal small bowel with yellowish exudate, which was biopsied  Ileoanal stricture was dilated to 20 mm with balloon  Biopsies at that time and shown chronic active enterocolitis with severe activity in CMV immunostain was negative  Patient otherwise reports that he saw colorectal and underwent pouchoscopy and dilatation of anastomotic stricture  He reports that he had fevers and chills at home as well as abdominal pain and nausea vomiting  Stool enteric panel and C diff came back negative  C reactive protein is trending up, was 89 on admission and now is 180 7  Patient reports he was reviewing better for about a month and the surgeon in [place] wanted him to have a pelvic MRI done at [facility]  Allergies:    Allergies   Allergen Reactions    Cefazolin Hives     Other reaction(s): Arrythmia (Abnormal Heartbeat), Rash Maculopapular    Mesalamine GI Intolerance     Other reaction(s): Pancreatitis  Annotation - [id number]: pancreatitis       Medications:  Current Facility-Administered Medications:     acetaminophen (TYLENOL) tablet 650 mg, 650 mg, Oral, Q6H PRN, Lulu Mercer MD    DULoxetine (CYMBALTA) delayed release capsule 60 mg, 60 mg, Oral, Daily, Lulu Mercer MD, 60 mg at 08/22/22 0813    HYDROmorphone (DILAUDID) injection 0 5 mg, 0 5 mg, Intravenous, Q3H PRN, Lulu Mercer MD, 0 5 mg at 08/22/22 0856    methylPREDNISolone sodium succinate (Solu-MEDROL) injection 20 mg, 20 mg, Intravenous, Q8H [facility], Lulu Mercer MD, 20 mg at 08/22/22 0535    ondansetron [facility]) injection 4 mg, 4 mg, Intravenous, Q6H PRN, Lulu Mercer MD, 4 mg at 08/21/22 1819    pantoprazole (PROTONIX) injection 40 mg, 40 mg, Intravenous, Q12H [facility], Lulu Mercer MD, 40 mg at 08/22/22 0812    piperacillin-tazobactam (ZOSYN) 3 375 g in sodium chloride 0 9 % 100 mL IVPB, 3 375 g, Intravenous, Q6H, Lulu Mercer MD, Last Rate: 200 mL/hr at 08/22/22 0535, 3 375 g at 08/22/22 0535    sodium chloride 0 9 % infusion, 75 mL/hr, Intravenous, Continuous, Lulu Mercer MD    vancomycin [facility]) oral solution 125 mg, 125 mg, Oral, Q6H [facility], Lulu Mercer MD, 125 mg at 08/22/22 0536    Past Medical history:  Past Medical History:   Diagnosis Date    [facility]ylosing spondyl[facility] [address] [facility]     Anxiety     Bowel obstruction ([facility] [address] [facility]     Clostridium difficile colitis 9/13/2018    Colitis     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[address] ) 9/13/2018    Pancreatitis     Ulcerative colitis ([address] )        Past Surgical History:   Past Surgical History:   Procedure Laterality Date    COLECTOMY TOTAL      with ileal pouch and anastemosis    IR PICC PLACEMENT SINGLE LUMEN  3/1/2022    TOTAL COLECTOMY         Family History: No family history on file      Social history: Social History     Socioeconomic History    Marital status: Single     Spouse name: Not on file    Number of children: Not on file    Years of education: Not on file    Highest education level: Not on file   Occupational History    Not on file   Tobacco Use    Smoking status: Never Smoker    Smokeless tobacco: Never Used   Vaping Use    Vaping Use: Never used   Substance and Sexual Activity    Alcohol use: Not Currently     Comment: pt states 5 a month/socially    Drug use: No    Sexual activity: Not on file   Other Topics Concern    Not on file   Social History Narrative    Not on file     Social Determinants of Health     Financial Resource Strain: Not on file   Food Insecurity: No Food Insecurity    Worried About Running Out of Food in the Last Year: Never true    Meghan of Food in the Last Year: Never true   Transportation Needs: No Transportation Needs    Lack of Transportation (Medical): No    Lack of Transportation (Non-Medical):  No   Physical Activity: Not on file   Stress: Not on file   Social Connections: Not on file   Intimate Partner Violence: Not on file   Housing Stability: Low Risk     Unable to Pay for Housing in the Last Year: No    Number of Places Lived in the Last Year: 1    Unstable Housing in the Last Year: No       Review of Systems: All other systems were reviewed and were negative, otherwise please refer to HPI    Physical Exam: /74   Pulse 72   Temp (!) 97 4 °F (36 3 °C)   Resp 19   Ht 5' 9" (1 753 m)   Wt 81 7 kg (180 lb 1 6 oz)   SpO2 96%   BMI 26 60 kg/m²     General Appearance:    Alert, cooperative, no distress, appears stated age   Head:    Normocephalic, without obvious abnormality, atraumatic   Eyes:    No scleral icterus           Mouth:  Mucosa moist   Neck:   Supple, symmetrical, trachea midline, no thyromegaly       Lungs:     Clear to auscultation bilaterally, respirations unlabored       Heart:    Regular rate and rhythm, S1 and S2 normal, no murmur, rub   or gallop     Abdomen:     Soft, positive bowel sounds, diffusely tender to palpation especially bilateral lower quadrants, no rebound rigidity guarding   Genitalia:   deferred   Rectal:   deferred   Extremities:   Extremities normal,no cyanosis or edema       Skin:   Skin color, texture, turgor normal, no rashes or lesions       Neurologic:   Grossly intact, no focal deficit           Lab Results:   Recent Results (from the past 24 hour(s))   CBC and differential    Collection Time: 08/21/22 11:28 AM   Result Value Ref Range    WBC 26 61 (H) 4 31 - 10 16 Thousand/uL    RBC 6 68 (H) 3 88 - 5 62 Million/uL    Hemoglobin 12 8 12 0 - 17 0 g/dL    Hematocrit 41 9 36 5 - 49 3 %    MCV 63 (L) 82 - 98 fL    MCH 19 2 (L) 26 8 - 34 3 pg    MCHC 30 5 (L) 31 4 - 37 4 g/dL    RDW 18 7 (H) 11 6 - 15 1 %    MPV 9 1 8 9 - 12 7 fL    Platelets 133 (H) 922 - 390 Thousands/uL    nRBC 0 /100 WBCs    Neutrophils Relative 88 (H) 43 - 75 %    Immat GRANS % 1 0 - 2 %    Lymphocytes Relative 4 (L) 14 - 44 %    Monocytes Relative 7 4 - 12 %    Eosinophils Relative 0 0 - 6 %    Basophils Relative 0 0 - 1 %    Neutrophils Absolute 23 31 (H) 1 85 - 7 62 Thousands/µL    Immature Grans Absolute 0 15 0 00 - 0 20 Thousand/uL    Lymphocytes Absolute 1 11 0 60 - 4 47 Thousands/µL    Monocytes Absolute 1 93 (H) 0 17 - 1 22 Thousand/µL    Eosinophils Absolute 0 01 0 00 - 0 61 Thousand/µL    Basophils Absolute 0 10 0 00 - 0 10 Thousands/µL   Comprehensive metabolic panel    Collection Time: 08/21/22 11:28 AM   Result Value Ref Range    Sodium 132 (L) 135 - 147 mmol/L    Potassium 3 3 (L) 3 5 - 5 3 mmol/L    Chloride 92 (L) 96 - 108 mmol/L    CO2 28 21 - 32 mmol/L    ANION GAP 12 4 - 13 mmol/L    BUN 12 5 - 25 mg/dL    Creatinine 1 27 0 60 - 1 30 mg/dL    Glucose 92 65 - 140 mg/dL    Calcium 9 4 8 3 - 10 1 mg/dL    AST 16 5 - 45 U/L    ALT 29 12 - 78 U/L    Alkaline Phosphatase 89 46 - 116 U/L    Total Protein 8 2 6 4 - 8 4 g/dL Albumin 4 2 3 5 - 5 0 g/dL    Total Bilirubin 1 63 (H) 0 20 - 1 00 mg/dL    eGFR 75 ml/min/1 73sq m   Blood culture #1    Collection Time: 08/21/22 11:28 AM    Specimen: Arm, Right; Blood   Result Value Ref Range    Blood Culture Received in Microbiology Lab  Culture in Progress  Blood culture #2    Collection Time: 08/21/22 11:28 AM    Specimen: Arm, Left; Blood   Result Value Ref Range    Blood Culture Received in Microbiology Lab  Culture in Progress      Lactic acid    Collection Time: 08/21/22 11:28 AM   Result Value Ref Range    LACTIC ACID 0 8 0 5 - 2 0 mmol/L   Lipase    Collection Time: 08/21/22 11:28 AM   Result Value Ref Range    Lipase 60 (L) 73 - 393 u/L   COVID only    Collection Time: 08/21/22 11:28 AM    Specimen: Nose; Nares   Result Value Ref Range    SARS-CoV-2 Negative Negative   C-reactive protein    Collection Time: 08/21/22 11:28 AM   Result Value Ref Range    CRP 89 0 (H) <3 0 mg/L   UA (URINE) with reflex to Scope    Collection Time: 08/21/22  1:03 PM   Result Value Ref Range    Color, UA Yellow     Clarity, UA Clear     Specific Gravity, UA 1 025 1 000 - 1 030    pH, UA 6 0 5 0, 5 5, 6 0, 6 5, 7 0, 7 5, 8 0, 8 5, 9 0    Leukocytes, UA Negative Negative    Nitrite, UA Negative Negative    Protein, UA 30 (1+) (A) Negative mg/dl    Glucose, UA Negative Negative mg/dl    Ketones, UA 40 (2+) (A) Negative mg/dl    Urobilinogen, UA 0 2 0 2, 1 0 E U /dl E U /dl    Bilirubin, UA Interference- unable to analyze (A) Negative    Occult Blood, UA Small (A) Negative   Urine Microscopic    Collection Time: 08/21/22  1:03 PM   Result Value Ref Range    RBC, UA 2-4 None Seen, 0-1, 1-2, 2-4, 0-5 /hpf    WBC, UA 2-4 None Seen, 0-1, 1-2, 0-5, 2-4 /hpf    Epithelial Cells None Seen None Seen, Occasional /hpf    Bacteria, UA Occasional None Seen, Occasional /hpf   Comprehensive metabolic panel    Collection Time: 08/22/22  5:38 AM   Result Value Ref Range    Sodium 134 (L) 135 - 147 mmol/L    Potassium 4 6 3 5 - 5 3 mmol/L    Chloride 99 96 - 108 mmol/L    CO2 26 21 - 32 mmol/L    ANION GAP 9 4 - 13 mmol/L    BUN 12 5 - 25 mg/dL    Creatinine 1 09 0 60 - 1 30 mg/dL    Glucose 135 65 - 140 mg/dL    Calcium 9 0 8 3 - 10 1 mg/dL    AST 10 5 - 45 U/L    ALT 21 12 - 78 U/L    Alkaline Phosphatase 76 46 - 116 U/L    Total Protein 7 4 6 4 - 8 4 g/dL    Albumin 3 5 3 5 - 5 0 g/dL    Total Bilirubin 0 72 0 20 - 1 00 mg/dL    eGFR 91 ml/min/1 73sq m   CBC and differential    Collection Time: 08/22/22  5:38 AM   Result Value Ref Range    WBC 19 11 (H) 4 31 - 10 16 Thousand/uL    RBC 5 86 (H) 3 88 - 5 62 Million/uL    Hemoglobin 11 2 (L) 12 0 - 17 0 g/dL    Hematocrit 37 4 36 5 - 49 3 %    MCV 64 (L) 82 - 98 fL    MCH 19 1 (L) 26 8 - 34 3 pg    MCHC 29 9 (L) 31 4 - 37 4 g/dL    RDW 18 0 (H) 11 6 - 15 1 %    MPV 8 6 (L) 8 9 - 12 7 fL    Platelets 954 596 - 015 Thousands/uL    nRBC 0 /100 WBCs    Neutrophils Relative 94 (H) 43 - 75 %    Immat GRANS % 1 0 - 2 %    Lymphocytes Relative 3 (L) 14 - 44 %    Monocytes Relative 2 (L) 4 - 12 %    Eosinophils Relative 0 0 - 6 %    Basophils Relative 0 0 - 1 %    Neutrophils Absolute 18 08 (H) 1 85 - 7 62 Thousands/µL    Immature Grans Absolute 0 09 0 00 - 0 20 Thousand/uL    Lymphocytes Absolute 0 56 (L) 0 60 - 4 47 Thousands/µL    Monocytes Absolute 0 36 0 17 - 1 22 Thousand/µL    Eosinophils Absolute 0 00 0 00 - 0 61 Thousand/µL    Basophils Absolute 0 02 0 00 - 0 10 Thousands/µL   Magnesium    Collection Time: 08/22/22  5:38 AM   Result Value Ref Range    Magnesium 2 3 1 6 - 2 6 mg/dL   C-reactive protein    Collection Time: 08/22/22  5:38 AM   Result Value Ref Range     7 (H) <3 0 mg/L       Imaging Studies: CT abdomen pelvis with contrast    Result Date: 8/21/2022  Narrative: CT ABDOMEN AND PELVIS WITH IV CONTRAST INDICATION:   Nausea/vomiting Abdominal pain, acute, nonlocalized abd pain/vomiting    Extensive medical history including chronic ulcerative pancolitis and complications of prior procedures and ileal pouchitis to name a few COMPARISON:  July 17, 2022 TECHNIQUE:  CT examination of the abdomen and pelvis was performed  Axial, sagittal, and coronal 2D reformatted images were created from the source data and submitted for interpretation  Radiation dose length product (DLP) for this visit:  489 13 mGy-cm   This examination, like all CT scans performed in the [facility], was performed utilizing techniques to minimize radiation dose exposure, including the use of iterative  reconstruction and automated exposure control  IV Contrast:  65 mL of iohexol (OMNIPAQUE) Enteric Contrast:  Enteric contrast was not administered  FINDINGS: ABDOMEN LOWER CHEST:  No clinically significant abnormality identified in the visualized lower chest  LIVER/BILIARY TREE:  Unremarkable  GALLBLADDER:  No calcified gallstones  No pericholecystic inflammatory change  SPLEEN:  Unremarkable  PANCREAS:  Unremarkable  ADRENAL GLANDS:  Unremarkable  KIDNEYS/URETERS:  Unremarkable  No hydronephrosis  STOMACH AND BOWEL:  Extensive postoperative changes of the bowel are present  Prominent fluid collection seen in the lumen of the ileal pouch in the pelvic area  The more proximal portion of the bowel seems grossly normal caliber  There does not seem to be any significant thickening of the wall of the bowel at this time  Stomach is unremarkable  Colon Absent  APPENDIX:  No findings to suggest appendicitis  ABDOMINOPELVIC CAVITY:  No ascites  No pneumoperitoneum  No lymphadenopathy  VESSELS:  Unremarkable for patient's age  PELVIS REPRODUCTIVE ORGANS:  Unremarkable for patient's age  URINARY BLADDER:  Mostly empty  No acute abnormality detected  ABDOMINAL WALL/INGUINAL REGIONS:  Unremarkable  OSSEOUS STRUCTURES:  No acute fracture or destructive osseous lesion  Impression: Ileal pouch is moderately distended with fluid  No obvious inflammatory changes are seen, however   Workstation performed: [id number]       Assessment/Plan: This is a 17-year-old male who has a history of ulcerative colitis status post ileoanal anastomosis with J-pouch formation with subsequent anastomotic stricture requiring dilatation in the past   Patient have flex-sig in May of this year which had shown some yellowish exudate the biopsies were consistent with chronic active enterocolitis with severe activity  Patient is no longer on immunosuppression, was previously on Samira Mettle for ankylosing spondylitis  Infectious workup for diarrhea was negative  Await blood cultures  Continue antibiotics for treatment of pouchitis, no need for IV steroids  Continue oral vanco for C diff prophylaxis while on antibiotics  Patient will need follow-up as outpatient with colorectal   Do not see previous report of procedure at [facility]  Patient planning for pelvic MRI and possible revision of J-pouch  We will make further recommendations pending his course  Thank you for the consult  Case was discussed with Dr Sidney Brooke

## 2022-08-22 NOTE — UTILIZATION REVIEW
Initial Clinical Review    Admission: Date/Time/Statement:   The patient will continue to require additional inpatient hospital stay due to abdominal pain, pouchitis Observation ordered incorrectly yesterday by the ER     08/22/22 1552  Inpatient Admission  Once        Transfer Service: Hospitalist       Question Answer   Level of Care Med Surg   Estimated length of stay More than 2 Midnights   Certification I certify that inpatient services are medically necessary for this patient for a duration of greater than two midnights  See H&P and MD Progress Notes for additional information about the patient's course of treatment  08/21/22 1438  Place in Observation  Once        Question: Level of Care Answer: Med Surg          ED Arrival Information     Expected   -    Arrival   8/21/2022 10:00    Acuity   Urgent            Means of arrival   Walk-In    Escorted by   Family Member    Service   Hospitalist    Admission type   Urgent            Arrival complaint   Flu like sym           Chief Complaint   Patient presents with    Abdominal Pain     Abd pain, nausea, vomiting x4, chills since 0200 this AM with no relief  Extensive GI hx per patient       Initial Presentation: 32 y o  male presents to ed from home for evaluation and treatment of abdominal pain, nausea, vomiting x 4 and chills  Onset 2am   PMHX: small bowel obstruction c diff colitis, ileal pouchitis, pancreatitis, ulcerative colitis  Clinical assessment significant for temp 100 9,  pain 10/10, tachycardia  CRP 89, , K+ 3 3,T BILI 1 63, WBC 26 61  Imaging shows ileal pouch distended with fluid but no obvious inflammation  PXEX shows generalized abdominal tenderness  Initially treated with iv piperacillin, iv mso4 x1, iv toradol x1, iv zofran x1, iv  9% ns bolus  Admit to observation for intractable abdominal pain    Plan to rule out c diff, start oral vancomycin, start iv solumedrol q8 hr and iv protonix, consult gastroenterology, continue iv fluids and iv piperacillin, NPO sips  Date: 8-22-22 Day 2:  Inpatient   Treatment plans includes iv zosyn, iv protonix, iv solumedrol and iv  9% ns with icl 20 meq  Pain persists 8 to 10/10  Received iv dilaudid last evening x2 and today at [id number], [id number], [address]  Leukocytosis improving, potassium wnl, afebrile  CRP declan to 180  Addendum: iv fluid rate decreased to 75/hr  ED Triage Vitals   08/21/22 1013 08/21/22 1013 08/21/22 1013 08/21/22 1013 08/21/22 1013   (!) 100 9°F   (38 3 °C) (!) 112 18 125/71 96 %      Tympanic Monitor         10 - Worst Possible Pain          08/22/22 81 7 kg (180 lb 1 6 oz)     Additional Vital Signs:     Date/Time Temp Pulse Resp BP MAP SpO2 O2 Device   08/22/22 07:36:48 97 4 °F (36 3 °C)   Abnormal  72 -- 120/74 89 96 % --   08/21/22 22:57:07 98 °F (36 7 °C) 94 -- 149/88 108 95 % --   08/21/22 19:39:55 98 1 °F (36 7 °C) 107   Abnormal  19 144/86 105 94 % --   08/21/22 1540 98 1 °F (36 7 °C) 89 18 124/85 -- -- --   08/21/22 15:28:39 98 1 °F (36 7 °C) -- -- 124/85 98 -- --   08/21/22 1500 -- 88 18 117/56 80 94 % --   08/21/22 1445 -- 90 18 148/77 103 95 % --   08/21/22 1430 -- 92 -- 138/77 101 96 % --   08/21/22 1415 -- 96 -- 129/67 92 97 % --   08/21/22 1400 -- 96 -- 143/74 102 96 % --   08/21/22 1330 99 2 °F (37 3 °C) 98 18 141/77 104 94 % --   08/21/22 1245 -- 102 -- 133/73 95 95 % --   08/21/22 1215 -- 104 18 137/74 100 94 % --   08/21/22 1145 -- 110   Abnormal  -- 131/74 95 97 % --   08/21/22 1013 100 9 °F (38 3 °C)   Abnormal  112   Abnormal  18 125/71 -- 96 % None (Room air)             Pertinent Labs/Diagnostic Test Results:       CT abdomen pelvis with contrast   Final  (08/21 1403)      Ileal pouch is moderately distended with fluid  No obvious inflammatory changes are seen, however             Results from last 7 days   Lab Units 08/21/22  1128   SARS-COV-2  Negative     Results from last 7 days   Lab Units 08/22/22  0538 08/21/22  1128   WBC Thousand/uL 19 11* 26 61*   HEMOGLOBIN g/dL 11 2* 12 8   HEMATOCRIT % 37 4 41 9   PLATELETS Thousands/uL 316 414*   NEUTROS ABS Thousands/µL 18 08* 23 31*         Results from last 7 days   Lab Units 08/22/22  0538 08/21/22  1128   SODIUM mmol/L 134* 132*   POTASSIUM mmol/L 4 6 3 3*   CHLORIDE mmol/L 99 92*   CO2 mmol/L 26 28   ANION GAP mmol/L 9 12   BUN mg/dL 12 12   CREATININE mg/dL 1 09 1 27   EGFR ml/min/1 73sq m 91 75   CALCIUM mg/dL 9 0 9 4   MAGNESIUM mg/dL 2 3  --      Results from last 7 days   Lab Units 08/22/22  0538 08/21/22  1128   AST U/L 10 16   ALT U/L 21 29   ALK PHOS U/L 76 89   TOTAL PROTEIN g/dL 7 4 8 2   ALBUMIN g/dL 3 5 4 2   TOTAL BILIRUBIN mg/dL 0 72 1 63*         Results from last 7 days   Lab Units 08/22/22  0538 08/21/22  1128   GLUCOSE RANDOM mg/dL 135 92       Results from last 7 days   Lab Units 08/21/22  1128   LACTIC ACID mmol/L 0 8       Results from last 7 days   Lab Units 08/21/22  1128   LIPASE u/L 60*     Results from last 7 days   Lab Units 08/22/22  0538 08/21/22  1128   CRP mg/L 180 7* 89 0*       Results from last 7 days   Lab Units 08/21/22  1303   CLARITY UA  Clear   COLOR UA  Yellow   SPEC GRAV UA  1 025   PH UA  6 0   GLUCOSE UA mg/dl Negative   KETONES UA mg/dl 40 (2+)*   BLOOD UA  Small*   PROTEIN UA mg/dl 30 (1+)*   NITRITE UA  Negative   BILIRUBIN UA  Interference- unable to analyze*   UROBILINOGEN UA E U /dl 0 2   LEUKOCYTES UA  Negative   WBC UA /hpf 2-4   RBC UA /hpf 2-4   BACTERIA UA /hpf Occasional   EPITHELIAL CELLS WET PREP /hpf None Seen     Results from last 7 days   Lab Units 08/21/22  1128   BLOOD CULTURE  Received in Microbiology Lab  Culture in Progress  Received in Microbiology Lab  Culture in Progress                 ED Treatment:   Medication Administration from 08/21/2022 0959 to 08/21/2022 1523       Date/Time Order Dose Route Action     08/21/2022 1134 sodium chloride 0 9 % bolus 1,000 mL 1,000 mL Intravenous New Bag     08/21/2022 3466 ondansetron (ZOFRAN) injection 4 mg 4 mg Intravenous Given     08/21/2022 1138 ketorolac (TORADOL) injection 15 mg 15 mg Intravenous Given     08/21/2022 1140 Famotidine (PF) (PEPCID) injection 20 mg 20 mg Intravenous Given     08/21/2022 1219 piperacillin-tazobactam (ZOSYN) IVPB 3 375 g 3 375 g Intravenous New Bag     08/21/2022 1216 morphine injection 4 mg 4 mg Intravenous Given     08/21/2022 1431 morphine injection 4 mg 4 mg Intravenous Given        Past Medical History:   Diagnosis Date    [facility]ylosing spondylitis ([facility] )     Anxiety     Bowel obstruction (HCC)     Clostridium difficile colitis 9/13/2018    Colitis     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[facility] ) 9/13/2018    Pancreatitis     Ulcerative colitis ([facility] )      Present on Admission:   Hyponatremia   History of Clostridium difficile infection   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[facility] )      Admitting Diagnosis:     Abdominal pain [R10 9]  Intractable abdominal pain [R10 9]    Age/Sex: 32 y o  male    Scheduled Medications:    DULoxetine, 60 mg, Oral, Daily  methylPREDNISolone sodium succinate, 20 mg, Intravenous, Q8H [address]  pantoprazole, 40 mg, Intravenous, Q12H [address]  piperacillin-tazobactam, 3 375 g, Intravenous, Q6H  vancomycin, 125 mg, Oral, Q6H [address]      Continuous IV Infusions:  sodium chloride 0 9 % with KCl 20 mEq/L, 125 mL/hr, Intravenous, Continuous      PRN Meds:  acetaminophen, 650 mg, Oral, Q6H PRN  HYDROmorphone, 0 5 mg, Intravenous, Q3H PRN  ondansetron, 4 mg, Intravenous, Q6H PRN        IP CONSULT TO GASTROENTEROLOGY    Network Utilization Review Department  ATTENTION: Please call with any questions or concerns to [phone] and carefully listen to the prompts so that you are directed to the right person  All voicemails are confidential   Jeanette Bee all requests for admission clinical reviews, approved or denied determinations and any other requests to dedicated fax number below belonging to the campus where the patient is receiving treatment   List of dedicated fax numbers for the Facilities:  FACILITY NAME UR FAX NUMBER   ADMISSION DENIALS (Administrative/Medical Necessity) [phone]   [address] (Maternity/NICU/Pediatrics) [address] [facility],[address] [facility] [address] [place] [address]  [phone]   [address] [facility] [address] [place] [id number] [address] [facility] [phone]

## 2022-08-22 NOTE — ASSESSMENT & PLAN NOTE
Patient with evidence of leukocytosis with a white blood cell count of 26 61 on admission improved to 19 11 today  This could be due to underlying infection and may also be reactive as well  Patient also received solumedrol  As noted above patient continued on IV Zosyn therapy in addition to oral vancomycin

## 2022-08-22 NOTE — ASSESSMENT & PLAN NOTE
Patient with evidence of hyponatremia and hypochloremia likely secondary to diarrhea and volume depletion  Sodium improved to 134 from 132 yesterday

## 2022-08-22 NOTE — ASSESSMENT & PLAN NOTE
Patient with a previous history of C diff  Current c diff testing noted to be negative  Will continue with oral vancomycin for c diff prophylaxis while patient is on antibiotic therapy

## 2022-08-22 NOTE — ASSESSMENT & PLAN NOTE
Patient presenting with intractable abdominal pain  Patient with a history of ulcerative colitis status post total proctocolectomy with J-pouch ileoanal anastomosis, anastomotic stricture and persistent inflammation as well as questionable pouchitis and possible underlying Crohn's disease  CT scan of the abdomen and pelvis performed in the emergency department shows that the ileal pouch is moderately distended with fluid however there is no obvious inflammation noted  Patient continued on IV Zosyn therapy at this time  Patient also with complaints of diarrhea at home  Patient with a history of C diff  C diff testing was negative but will continue with oral vancomycin while patient is on antibiotic therapy  Solumedrol discontinued by GI  Follow up with further recommendations from GI

## 2022-08-22 NOTE — PLAN OF CARE
Problem: Potential for Falls  Goal: Patient will remain free of falls  Description: INTERVENTIONS:  - Educate patient/family on patient safety including physical limitations  - Instruct patient to call for assistance with activity   - Consult OT/PT to assist with strengthening/mobility   - Keep Call bell within reach  - Keep bed low and locked with side rails adjusted as appropriate  - Keep care items and personal belongings within reach  - Initiate and maintain comfort rounds  - Make Fall Risk Sign visible to staff  - Offer Toileting every 3 Hours, in advance of need  - Initiate/Maintain bed alarm  - Obtain necessary fall risk management equipment: yellow socks  - Apply yellow socks and bracelet for high fall risk patients  - Consider moving patient to room near nurses station  Outcome: Progressing     Problem: PAIN - ADULT  Goal: Verbalizes/displays adequate comfort level or baseline comfort level  Description: Interventions:  - Encourage patient to monitor pain and request assistance  - Assess pain using appropriate pain scale  - Administer analgesics based on type and severity of pain and evaluate response  - Implement non-pharmacological measures as appropriate and evaluate response  - Consider cultural and social influences on pain and pain management  - Notify physician/advanced practitioner if interventions unsuccessful or patient reports new pain  Outcome: Progressing     Problem: INFECTION - ADULT  Goal: Absence or prevention of progression during hospitalization  Description: INTERVENTIONS:  - Assess and monitor for signs and symptoms of infection  - Monitor lab/diagnostic results  - Monitor all insertion sites, i e  indwelling lines, tubes, and drains  - Monitor endotracheal if appropriate and nasal secretions for changes in amount and color  - [place] appropriate cooling/warming therapies per order  - Administer medications as ordered  - Instruct and encourage patient and family to use good hand hygiene technique  - Identify and instruct in appropriate isolation precautions for identified infection/condition  Outcome: Progressing  Goal: Absence of fever/infection during neutropenic period  Description: INTERVENTIONS:  - Monitor WBC    Outcome: Progressing     Problem: SAFETY ADULT  Goal: Patient will remain free of falls  Description: INTERVENTIONS:  - Educate patient/family on patient safety including physical limitations  - Instruct patient to call for assistance with activity   - Consult OT/PT to assist with strengthening/mobility   - Keep Call bell within reach  - Keep bed low and locked with side rails adjusted as appropriate  - Keep care items and personal belongings within reach  - Initiate and maintain comfort rounds  - Make Fall Risk Sign visible to staff  - Offer Toileting every  Hours, in advance of need  - Initiate/Maintain bed alarm  - Obtain necessary fall risk management equipment: yellow socks  - Apply yellow socks and bracelet for high fall risk patients  - Consider moving patient to room near nurses station  Outcome: Progressing  Goal: Maintain or return to baseline ADL function  Description: INTERVENTIONS:  -  Assess patient's ability to carry out ADLs; assess patient's baseline for ADL function and identify physical deficits which impact ability to perform ADLs (bathing, care of mouth/teeth, toileting, grooming, dressing, etc )  - Assess/evaluate cause of self-care deficits   - Assess range of motion  - Assess patient's mobility; develop plan if impaired  - Assess patient's need for assistive devices and provide as appropriate  - Encourage maximum independence but intervene and supervise when necessary  - Involve family in performance of ADLs  - Assess for home care needs following discharge   - Consider OT consult to assist with ADL evaluation and planning for discharge  - Provide patient education as appropriate  Outcome: Progressing  Goal: Maintains/Returns to pre admission functional level  Description: INTERVENTIONS:  - Perform BMAT or MOVE assessment daily    - Set and communicate daily mobility goal to care team and patient/family/caregiver  - Collaborate with rehabilitation services on mobility goals if consulted  - Perform Range of Motion 3 times a day  - Reposition patient every 2 hours  - Dangle patient 3 times a day  - Stand patient 3 times a day  - Ambulate patient 3 times a day  - Out of bed to chair 3 times a day   - Out of bed for meals 3 times a day  - Out of bed for toileting  - Record patient progress and toleration of activity level   Outcome: Progressing     Problem: DISCHARGE PLANNING  Goal: Discharge to home or other facility with appropriate resources  Description: INTERVENTIONS:  - Identify barriers to discharge w/patient and caregiver  - Arrange for needed discharge resources and transportation as appropriate  - Identify discharge learning needs (meds, wound care, etc )  - Arrange for interpretive services to assist at discharge as needed  - Refer to Case Management Department for coordinating discharge planning if the patient needs post-hospital services based on physician/advanced practitioner order or complex needs related to functional status, cognitive ability, or social support system  Outcome: Progressing     Problem: Knowledge Deficit  Goal: Patient/family/caregiver demonstrates understanding of disease process, treatment plan, medications, and discharge instructions  Description: Complete learning assessment and assess knowledge base    Interventions:  - Provide teaching at level of understanding  - Provide teaching via preferred learning methods  Outcome: Progressing

## 2022-08-22 NOTE — PROGRESS NOTES
[address]  Progress Note - Angelika Rod 1995, 32 y o  male MRN: [id number]  Unit/Bed#: [address] [place] [address] Encounter: [id number]  Primary Care Provider: Ashley Rosa DO   Date and time admitted to hospital: 8/21/2022 10:07 AM    * Intractable abdominal pain  Assessment & Plan  Patient presenting with intractable abdominal pain  Patient with a history of ulcerative colitis status post total proctocolectomy with J-pouch ileoanal anastomosis, anastomotic stricture and persistent inflammation as well as questionable pouchitis and possible underlying Crohn's disease  CT scan of the abdomen and pelvis performed in the emergency department shows that the ileal pouch is moderately distended with fluid however there is no obvious inflammation noted  Patient continued on IV Zosyn therapy at this time  Patient also with complaints of diarrhea at home  Patient with a history of C diff  C diff testing was negative but will continue with oral vancomycin while patient is on antibiotic therapy  Solumedrol discontinued by GI  Follow up with further recommendations from GI  Elevated C-reactive protein (CRP)  Assessment & Plan   7 today  History of Clostridium difficile infection  Assessment & Plan  Patient with a previous history of C diff  Current c diff testing noted to be negative  Will continue with oral vancomycin for c diff prophylaxis while patient is on antibiotic therapy  Hypokalemia  Assessment & Plan  Potassium improved to 4 6  Will lower the rate of IV fluids and remove the potassium from the bag  Hyponatremia  Assessment & Plan  Patient with evidence of hyponatremia and hypochloremia likely secondary to diarrhea and volume depletion  Sodium improved to 134 from 132 yesterday  Leukocytosis  Assessment & Plan  Patient with evidence of leukocytosis with a white blood cell count of 26 61 on admission improved to 19 11 today    This could be due to underlying infection and may also be reactive as well  Patient also received solumedrol  As noted above patient continued on IV Zosyn therapy in addition to oral vancomycin  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[address] )  Assessment & Plan  As noted above  Follow-up with recommendations from Gastroenterology  VTE Pharmacologic Prophylaxis: VTE Score: 2 SCD's  Patient Centered Rounds: I performed bedside rounds with nursing staff today  Discussions with Specialists or Other Care Team Provider: Yes  Education and Discussions with Family / Patient: Yes  Time Spent for Care: 30 minutes  More than 50% of total time spent on counseling and coordination of care as described above  Current Length of Stay: 0 day(s)  Current Patient Status: Observation   Certification Statement: The patient will continue to require additional inpatient hospital stay due to abdominal pain, pouchitis Observation ordered incorrectly yesterday by the ER  Discharge Plan: TBD  Code Status: Level 1 - Full Code    Subjective:   Patient seen and examined at bedside  No acute events overnight  Patient reports that his abdominal pain is slightly better today  Reports that he has had a few episodes of diarrhea today  Denies any chest pain or shortness of breath  Objective:     Vitals:   Temp (24hrs), Av 8 °F (36 6 °C), Min:97 4 °F (36 3 °C), Max:98 1 °F (36 7 °C)    Temp:  [97 4 °F (36 3 °C)-98 1 °F (36 7 °C)] 97 6 °F (36 4 °C)  HR:  [] 88  Resp:  [18-20] 20  BP: (120-149)/(74-88) 131/78  SpO2:  [94 %-96 %] 94 %  Body mass index is 26 6 kg/m²  Input and Output Summary (last 24 hours): Intake/Output Summary (Last 24 hours) at 2022 1548  Last data filed at 2022 0947  Gross per 24 hour   Intake 2775 ml   Output --   Net 2775 ml       Physical Exam:   Physical Exam  HENT:      Head: Normocephalic  Mouth/Throat:      Mouth: Mucous membranes are moist    Eyes:      Extraocular Movements: Extraocular movements intact     Cardiovascular: Rate and Rhythm: Normal rate  Pulmonary:      Effort: Pulmonary effort is normal  No respiratory distress  Abdominal:      Palpations: Abdomen is soft  Tenderness: There is abdominal tenderness  Comments: No rebound tenderness or guarding  Skin:     General: Skin is warm  Neurological:      Mental Status: He is alert and oriented to person, place, and time  Psychiatric:         Mood and Affect: Mood normal          Behavior: Behavior normal        Additional Data:     Labs:  Results from last 7 days   Lab Units 08/22/22  0538   WBC Thousand/uL 19 11*   HEMOGLOBIN g/dL 11 2*   HEMATOCRIT % 37 4   PLATELETS Thousands/uL 316   NEUTROS PCT % 94*   LYMPHS PCT % 3*   MONOS PCT % 2*   EOS PCT % 0     Results from last 7 days   Lab Units 08/22/22  0538   SODIUM mmol/L 134*   POTASSIUM mmol/L 4 6   CHLORIDE mmol/L 99   CO2 mmol/L 26   BUN mg/dL 12   CREATININE mg/dL 1 09   ANION GAP mmol/L 9   CALCIUM mg/dL 9 0   ALBUMIN g/dL 3 5   TOTAL BILIRUBIN mg/dL 0 72   ALK PHOS U/L 76   ALT U/L 21   AST U/L 10   GLUCOSE RANDOM mg/dL 135                 Results from last 7 days   Lab Units 08/21/22  1128   LACTIC ACID mmol/L 0 8       Lines/Drains:  Invasive Devices  Report    Peripheral Intravenous Line  Duration           Peripheral IV 08/21/22 Right;Upper Arm 1 day              Imaging: Reviewed  Recent Cultures (last 7 days):   Results from last 7 days   Lab Units 08/21/22 1959 08/21/22  1128   BLOOD CULTURE   --  Received in Microbiology Lab  Culture in Progress  Received in Microbiology Lab  Culture in Progress     C DIFF TOXIN B BY PCR  Negative  --        Last 24 Hours Medication List:   Current Facility-Administered Medications   Medication Dose Route Frequency Provider Last Rate    acetaminophen  650 mg Oral Q6H PRN Alem Baron MD      DULoxetine  60 mg Oral Daily Alem Baron MD      HYDROmorphone  0 5 mg Intravenous Q3H PRN Alem Baron MD      ondansetron  4 mg Intravenous Q6H PRN Alem Baron MD      pantoprazole  40 mg Intravenous Q12H [address] Alem Baron MD      piperacillin-tazobactam  3 375 g Intravenous Jenaro Winn MD 3 375 g (08/22/22 1208)    sodium chloride  75 mL/hr Intravenous Continuous Alem Baron MD 75 mL/hr (08/22/22 0947)    vancomycin  125 mg Oral Q12H [address], PAM[facility]al[facility]          Today, Patient Was Seen By: Alem Baron MD    **Please Note: This note may have been constructed using a voice recognition system  **

## 2022-08-23 LAB
ALBUMIN SERPL BCP-MCNC: 3 G/DL (ref 3.5–5)
ALP SERPL-CCNC: 54 U/L (ref 46–116)
ALT SERPL W P-5'-P-CCNC: 18 U/L (ref 12–78)
ANION GAP SERPL CALCULATED.3IONS-SCNC: 8 MMOL/L (ref 4–13)
AST SERPL W P-5'-P-CCNC: 8 U/L (ref 5–45)
BASOPHILS # BLD AUTO: 0.02 THOUSANDS/ΜL (ref 0–0.1)
BASOPHILS NFR BLD AUTO: 0 % (ref 0–1)
BILIRUB SERPL-MCNC: 0.55 MG/DL (ref 0.2–1)
BUN SERPL-MCNC: 12 MG/DL (ref 5–25)
CALCIUM ALBUM COR SERPL-MCNC: 9.2 MG/DL (ref 8.3–10.1)
CALCIUM SERPL-MCNC: 8.4 MG/DL (ref 8.3–10.1)
CHLORIDE SERPL-SCNC: 100 MMOL/L (ref 96–108)
CO2 SERPL-SCNC: 28 MMOL/L (ref 21–32)
CREAT SERPL-MCNC: 0.91 MG/DL (ref 0.6–1.3)
EOSINOPHIL # BLD AUTO: 0 THOUSAND/ΜL (ref 0–0.61)
EOSINOPHIL NFR BLD AUTO: 0 % (ref 0–6)
ERYTHROCYTE [DISTWIDTH] IN BLOOD BY AUTOMATED COUNT: 17.8 % (ref 11.6–15.1)
GFR SERPL CREATININE-BSD FRML MDRD: 113 ML/MIN/1.73SQ M
GLUCOSE SERPL-MCNC: 102 MG/DL (ref 65–140)
HCT VFR BLD AUTO: 35 % (ref 36.5–49.3)
HGB BLD-MCNC: 10.4 G/DL (ref 12–17)
IMM GRANULOCYTES # BLD AUTO: 0.13 THOUSAND/UL (ref 0–0.2)
IMM GRANULOCYTES NFR BLD AUTO: 1 % (ref 0–2)
LYMPHOCYTES # BLD AUTO: 1.33 THOUSANDS/ΜL (ref 0.6–4.47)
LYMPHOCYTES NFR BLD AUTO: 8 % (ref 14–44)
MAGNESIUM SERPL-MCNC: 2.4 MG/DL (ref 1.6–2.6)
MCH RBC QN AUTO: 19.1 PG (ref 26.8–34.3)
MCHC RBC AUTO-ENTMCNC: 29.7 G/DL (ref 31.4–37.4)
MCV RBC AUTO: 64 FL (ref 82–98)
MONOCYTES # BLD AUTO: 1.98 THOUSAND/ΜL (ref 0.17–1.22)
MONOCYTES NFR BLD AUTO: 11 % (ref 4–12)
NEUTROPHILS # BLD AUTO: 14.2 THOUSANDS/ΜL (ref 1.85–7.62)
NEUTS SEG NFR BLD AUTO: 80 % (ref 43–75)
NRBC BLD AUTO-RTO: 0 /100 WBCS
PLATELET # BLD AUTO: 323 THOUSANDS/UL (ref 149–390)
PMV BLD AUTO: 8.8 FL (ref 8.9–12.7)
POTASSIUM SERPL-SCNC: 4.1 MMOL/L (ref 3.5–5.3)
PROT SERPL-MCNC: 6.6 G/DL (ref 6.4–8.4)
RBC # BLD AUTO: 5.45 MILLION/UL (ref 3.88–5.62)
SODIUM SERPL-SCNC: 136 MMOL/L (ref 135–147)
WBC # BLD AUTO: 17.66 THOUSAND/UL (ref 4.31–10.16)

## 2022-08-23 PROCEDURE — 99232 SBSQ HOSP IP/OBS MODERATE 35: CPT | Performed by: PHYSICIAN ASSISTANT

## 2022-08-23 PROCEDURE — 83735 ASSAY OF MAGNESIUM: CPT | Performed by: FAMILY MEDICINE

## 2022-08-23 PROCEDURE — 85025 COMPLETE CBC W/AUTO DIFF WBC: CPT | Performed by: FAMILY MEDICINE

## 2022-08-23 PROCEDURE — 80053 COMPREHEN METABOLIC PANEL: CPT | Performed by: FAMILY MEDICINE

## 2022-08-23 PROCEDURE — 99232 SBSQ HOSP IP/OBS MODERATE 35: CPT | Performed by: FAMILY MEDICINE

## 2022-08-23 PROCEDURE — C9113 INJ PANTOPRAZOLE SODIUM, VIA: HCPCS | Performed by: FAMILY MEDICINE

## 2022-08-23 RX ADMIN — PANTOPRAZOLE SODIUM 40 MG: 40 INJECTION, POWDER, FOR SOLUTION INTRAVENOUS at 20:37

## 2022-08-23 RX ADMIN — PANTOPRAZOLE SODIUM 40 MG: 40 INJECTION, POWDER, FOR SOLUTION INTRAVENOUS at 08:54

## 2022-08-23 RX ADMIN — HYDROMORPHONE HYDROCHLORIDE 0.5 MG: 1 INJECTION, SOLUTION INTRAMUSCULAR; INTRAVENOUS; SUBCUTANEOUS at 18:33

## 2022-08-23 RX ADMIN — HYDROMORPHONE HYDROCHLORIDE 0.5 MG: 1 INJECTION, SOLUTION INTRAMUSCULAR; INTRAVENOUS; SUBCUTANEOUS at 05:35

## 2022-08-23 RX ADMIN — HYDROMORPHONE HYDROCHLORIDE 0.5 MG: 1 INJECTION, SOLUTION INTRAMUSCULAR; INTRAVENOUS; SUBCUTANEOUS at 21:41

## 2022-08-23 RX ADMIN — SODIUM CHLORIDE 75 ML/HR: 0.9 INJECTION, SOLUTION INTRAVENOUS at 18:36

## 2022-08-23 RX ADMIN — HYDROMORPHONE HYDROCHLORIDE 0.5 MG: 1 INJECTION, SOLUTION INTRAMUSCULAR; INTRAVENOUS; SUBCUTANEOUS at 15:17

## 2022-08-23 RX ADMIN — SODIUM CHLORIDE 75 ML/HR: 0.9 INJECTION, SOLUTION INTRAVENOUS at 01:28

## 2022-08-23 RX ADMIN — HYDROMORPHONE HYDROCHLORIDE 0.5 MG: 1 INJECTION, SOLUTION INTRAMUSCULAR; INTRAVENOUS; SUBCUTANEOUS at 08:54

## 2022-08-23 RX ADMIN — PIPERACILLIN AND TAZOBACTAM 3.38 G: 36; 4.5 INJECTION, POWDER, FOR SOLUTION INTRAVENOUS at 05:35

## 2022-08-23 RX ADMIN — PIPERACILLIN AND TAZOBACTAM 3.38 G: 36; 4.5 INJECTION, POWDER, FOR SOLUTION INTRAVENOUS at 17:51

## 2022-08-23 RX ADMIN — HYDROMORPHONE HYDROCHLORIDE 0.5 MG: 1 INJECTION, SOLUTION INTRAMUSCULAR; INTRAVENOUS; SUBCUTANEOUS at 01:28

## 2022-08-23 RX ADMIN — DULOXETINE HYDROCHLORIDE 60 MG: 60 CAPSULE, DELAYED RELEASE ORAL at 08:54

## 2022-08-23 RX ADMIN — ONDANSETRON 4 MG: 2 INJECTION INTRAMUSCULAR; INTRAVENOUS at 08:54

## 2022-08-23 RX ADMIN — HYDROMORPHONE HYDROCHLORIDE 0.5 MG: 1 INJECTION, SOLUTION INTRAMUSCULAR; INTRAVENOUS; SUBCUTANEOUS at 12:07

## 2022-08-23 RX ADMIN — Medication 125 MG: at 20:37

## 2022-08-23 RX ADMIN — ONDANSETRON 4 MG: 2 INJECTION INTRAMUSCULAR; INTRAVENOUS at 01:28

## 2022-08-23 RX ADMIN — Medication 125 MG: at 08:55

## 2022-08-23 RX ADMIN — PIPERACILLIN AND TAZOBACTAM 3.38 G: 36; 4.5 INJECTION, POWDER, FOR SOLUTION INTRAVENOUS at 12:08

## 2022-08-23 NOTE — UTILIZATION REVIEW
Continued Stay Review    Date: 8/23/22   DAY 2                          Current Patient Class: inpatient  Current Level of Care: med surg    HPI:27 y o  male initially admitted on 8/22/22     Assessment/Plan:   IVABT in progress for pouchitis  Persistent abd pain requiring IV Dilaudid for pain control  Also requiring IV Zofran for nausea, IV PPI continued  Remains NPO with IVF maintained at this time  Abd-positive bowel sounds, some mild diffuse tenderness to palpation, no rebound rigidity guarding, non-distended  Reports that he still had multiple bowel movements overnight although he does not feel that he is passing a significant amount of stool  Per GI: ulcerative colitis status post ileoanal anastomosis with J-pouch formation with subsequent anastomotic stricture requiring dilatation in the past   Patient have flex-sig in May of this year which had shown some yellowish exudate the biopsies were consistent with chronic active enterocolitis with severe activity  Infectious workup for diarrhea was negative  Await blood cultures  Continue antibiotics for treatment of pouchitis, no need for IV steroids  Continue oral vanco for C diff prophylaxis while on antibiotics       Vital Signs:   /71 (BP Location: Left arm)   Pulse 65   Temp (!) 97 4 °F (36 3 °C) (Oral)   Resp 17   Ht 5' 9" (1 753 m)   Wt 83 kg (183 lb)   SpO2 97%   BMI 27 02 kg/m²     Pertinent Labs/Diagnostic Results:   Results from last 7 days   Lab Units 08/21/22  1128   SARS-COV-2  Negative     Results from last 7 days   Lab Units 08/23/22  0533 08/22/22  0538 08/21/22  1128   WBC Thousand/uL 17 66* 19 11* 26 61*   HEMOGLOBIN g/dL 10 4* 11 2* 12 8   HEMATOCRIT % 35 0* 37 4 41 9   PLATELETS Thousands/uL 323 316 414*   NEUTROS ABS Thousands/µL 14 20* 18 08* 23 31*     Results from last 7 days   Lab Units 08/23/22  0533 08/22/22  0538 08/21/22  1128   SODIUM mmol/L 136 134* 132*   POTASSIUM mmol/L 4 1 4 6 3 3*   CHLORIDE mmol/L 100 99 92* CO2 mmol/L 28 26 28   ANION GAP mmol/L 8 9 12   BUN mg/dL 12 12 12   CREATININE mg/dL 0 91 1 09 1 27   EGFR ml/min/1 73sq m 113 91 75   CALCIUM mg/dL 8 4 9 0 9 4   MAGNESIUM mg/dL 2 4 2 3  --      Results from last 7 days   Lab Units 08/23/22  0533 08/22/22  0538 08/21/22  1128   AST U/L 8 10 16   ALT U/L 18 21 29   ALK PHOS U/L 54 76 89   TOTAL PROTEIN g/dL 6 6 7 4 8 2   ALBUMIN g/dL 3 0* 3 5 4 2   TOTAL BILIRUBIN mg/dL 0 55 0 72 1 63*     Results from last 7 days   Lab Units 08/23/22  0533 08/22/22  0538 08/21/22  1128   GLUCOSE RANDOM mg/dL 102 135 92     Results from last 7 days   Lab Units 08/21/22  1128   LACTIC ACID mmol/L 0 8     Results from last 7 days   Lab Units 08/21/22  1128   LIPASE u/L 60*     Results from last 7 days   Lab Units 08/22/22  0538 08/21/22  1128   CRP mg/L 180 7* 89 0*     Results from last 7 days   Lab Units 08/21/22  1303   CLARITY UA  Clear   COLOR UA  Yellow   SPEC GRAV UA  1 025   PH UA  6 0   GLUCOSE UA mg/dl Negative   KETONES UA mg/dl 40 (2+)*   BLOOD UA  Small*   PROTEIN UA mg/dl 30 (1+)*   NITRITE UA  Negative   BILIRUBIN UA  Interference- unable to analyze*   UROBILINOGEN UA E U /dl 0 2   LEUKOCYTES UA  Negative   WBC UA /hpf 2-4   RBC UA /hpf 2-4   BACTERIA UA /hpf Occasional   EPITHELIAL CELLS WET PREP /hpf None Seen     Results from last 7 days   Lab Units 08/21/22 1959   C DIFF TOXIN B BY PCR  Negative     Results from last 7 days   Lab Units 08/21/22 1959   SALMONELLA SP PCR  None Detected   SHIGELLA SP/ENTEROINVASIVE E  COLI (EIEC)  None Detected   CAMPYLOBACTER SP (JEJUNI AND COLI)  None Detected   SHIGA TOXIN 1/SHIGA TOXIN 2  None Detected         Results from last 7 days   Lab Units 08/21/22  1128   BLOOD CULTURE  No Growth at 24 hrs  No Growth at 24 hrs       Medications:   DULoxetine, 60 mg, Oral, Daily  pantoprazole, 40 mg, Intravenous, Q12H KAITLIN  piperacillin-tazobactam, 3 375 g, Intravenous, Q6H  vancomycin, 125 mg, Oral, Q12H [facility]    Continuous IV Infusions:  sodium chloride, 75 mL/hr, Intravenous, Continuous    PRN Meds:  acetaminophen, 650 mg, Oral, Q6H PRN  HYDROmorphone, 0 5 mg, Intravenous, Q3H PRN  ondansetron, 4 mg, Intravenous, Q6H PRN    Discharge Plan: [facility]    Network Utilization Review Department  ATTENTION: Please call with any questions or concerns to [phone] and carefully listen to the prompts so that you are directed to the right person  All voicemails are confidential   Mino Alcantar all requests for admission clinical reviews, approved or denied determinations and any other requests to dedicated fax number below belonging to the campus where the patient is receiving treatment   List of dedicated fax numbers for the Facilities:  [address] DENIALS (Administrative/Medical Necessity) [phone]   [address] (Maternity/NICU/Pediatrics) [phone]   [address]  [address] [facility] [place] [id number] [place] [address] Stephanie Franks [address] [facility] [phone]

## 2022-08-23 NOTE — PLAN OF CARE
Problem: Potential for Falls  Goal: Patient will remain free of falls  Description: INTERVENTIONS:  - Educate patient/family on patient safety including physical limitations  - Instruct patient to call for assistance with activity   - Consult OT/PT to assist with strengthening/mobility   - Keep Call bell within reach  - Keep bed low and locked with side rails adjusted as appropriate  - Keep care items and personal belongings within reach  - Initiate and maintain comfort rounds  - Make Fall Risk Sign visible to staff  - Offer Toileting every  Hours, in advance of need  - Initiate/Maintain alarm  - Obtain necessary fall risk management equipment:   - Apply yellow socks and bracelet for high fall risk patients  - Consider moving patient to room near nurses station  Outcome: Progressing     Problem: PAIN - ADULT  Goal: Verbalizes/displays adequate comfort level or baseline comfort level  Description: Interventions:  - Encourage patient to monitor pain and request assistance  - Assess pain using appropriate pain scale  - Administer analgesics based on type and severity of pain and evaluate response  - Implement non-pharmacological measures as appropriate and evaluate response  - Consider cultural and social influences on pain and pain management  - Notify physician/advanced practitioner if interventions unsuccessful or patient reports new pain  Outcome: Progressing     Problem: INFECTION - ADULT  Goal: Absence or prevention of progression during hospitalization  Description: INTERVENTIONS:  - Assess and monitor for signs and symptoms of infection  - Monitor lab/diagnostic results  - Monitor all insertion sites, i e  indwelling lines, tubes, and drains  - Monitor endotracheal if appropriate and nasal secretions for changes in amount and color  - Bath appropriate cooling/warming therapies per order  - Administer medications as ordered  - Instruct and encourage patient and family to use good hand hygiene technique  - Identify and instruct in appropriate isolation precautions for identified infection/condition  Outcome: Progressing  Goal: Absence of fever/infection during neutropenic period  Description: INTERVENTIONS:  - Monitor WBC    Outcome: Progressing

## 2022-08-23 NOTE — PROGRESS NOTES
Progress Note - Gary Bansal 32 y o  male MRN: [id number]    Unit/Bed#: [address] [place] [address] Encounter: [id number]        Assessment/Plan: This is a 78-year-old male who has a history of ulcerative colitis status post ileoanal anastomosis with J-pouch formation with subsequent anastomotic stricture requiring dilatation in the past   Patient have flex-sig in May of this year which had shown some yellowish exudate the biopsies were consistent with chronic active enterocolitis with severe activity  Patient is no longer on immunosuppression, was previously on [employer] Mew for ankylosing spondylitis  Infectious workup for diarrhea was negative  Await blood cultures, no growth thus far  Continue antibiotics for treatment of pouchitis, no need for IV steroids  Leukocytosis improving, patient reports that he was not taking antibiotics or steroids at home although he was discharged on 1 month of Flagyl in 6 weeks of steroids last month  Continue oral vanco for C diff prophylaxis while on antibiotics  Patient will need follow-up as outpatient with colorectal  Patient did have EUA 6/14 showing long cuff and dilated pouch concerning for a twist   Patient had barium enema that day as well which had shown no significant abnormality and contrast reached ileum  Patient planning for pelvic MRI and possible revision of J-pouch  Will advance diet as tolerated  Advised patient to call surgeon to update him of his status  Subjective:   Patient is lying in bed  Reports that he still having abdominal pain  Reports that he still had multiple bowel movements overnight although he does not feel that he is passing a significant amount of stool  Remains NPO      Objective:     Vitals: /71 (BP Location: Left arm)   Pulse 65   Temp (!) 97 4 °F (36 3 °C) (Oral)   Resp 17   Ht 5' 9" (1 753 m)   Wt 83 kg (183 lb)   SpO2 97%   BMI 27 02 kg/m²       Physical Exam:  Gen-alert no acute distress  Abd-positive bowel sounds, some mild diffuse tenderness to palpation, no rebound rigidity guarding, nondistended       Lab, Imaging and other studies:   Recent Results (from the past 72 hour(s))   CBC and differential    Collection Time: 08/21/22 11:28 AM   Result Value Ref Range    WBC 26 61 (H) 4 31 - 10 16 Thousand/uL    RBC 6 68 (H) 3 88 - 5 62 Million/uL    Hemoglobin 12 8 12 0 - 17 0 g/dL    Hematocrit 41 9 36 5 - 49 3 %    MCV 63 (L) 82 - 98 fL    MCH 19 2 (L) 26 8 - 34 3 pg    MCHC 30 5 (L) 31 4 - 37 4 g/dL    RDW 18 7 (H) 11 6 - 15 1 %    MPV 9 1 8 9 - 12 7 fL    Platelets 471 (H) 700 - 390 Thousands/uL    nRBC 0 /100 WBCs    Neutrophils Relative 88 (H) 43 - 75 %    Immat GRANS % 1 0 - 2 %    Lymphocytes Relative 4 (L) 14 - 44 %    Monocytes Relative 7 4 - 12 %    Eosinophils Relative 0 0 - 6 %    Basophils Relative 0 0 - 1 %    Neutrophils Absolute 23 31 (H) 1 85 - 7 62 Thousands/µL    Immature Grans Absolute 0 15 0 00 - 0 20 Thousand/uL    Lymphocytes Absolute 1 11 0 60 - 4 47 Thousands/µL    Monocytes Absolute 1 93 (H) 0 17 - 1 22 Thousand/µL    Eosinophils Absolute 0 01 0 00 - 0 61 Thousand/µL    Basophils Absolute 0 10 0 00 - 0 10 Thousands/µL   Comprehensive metabolic panel    Collection Time: 08/21/22 11:28 AM   Result Value Ref Range    Sodium 132 (L) 135 - 147 mmol/L    Potassium 3 3 (L) 3 5 - 5 3 mmol/L    Chloride 92 (L) 96 - 108 mmol/L    CO2 28 21 - 32 mmol/L    ANION GAP 12 4 - 13 mmol/L    BUN 12 5 - 25 mg/dL    Creatinine 1 27 0 60 - 1 30 mg/dL    Glucose 92 65 - 140 mg/dL    Calcium 9 4 8 3 - 10 1 mg/dL    AST 16 5 - 45 U/L    ALT 29 12 - 78 U/L    Alkaline Phosphatase 89 46 - 116 U/L    Total Protein 8 2 6 4 - 8 4 g/dL    Albumin 4 2 3 5 - 5 0 g/dL    Total Bilirubin 1 63 (H) 0 20 - 1 00 mg/dL    eGFR 75 ml/min/1 73sq m   Blood culture #1    Collection Time: 08/21/22 11:28 AM    Specimen: Arm, Right; Blood   Result Value Ref Range    Blood Culture No Growth at 24 hrs      Blood culture #2    Collection Time: 08/21/22 11:28 AM Specimen: Arm, Left; Blood   Result Value Ref Range    Blood Culture No Growth at 24 hrs      Lactic acid    Collection Time: 08/21/22 11:28 AM   Result Value Ref Range    LACTIC ACID 0 8 0 5 - 2 0 mmol/L   Lipase    Collection Time: 08/21/22 11:28 AM   Result Value Ref Range    Lipase 60 (L) 73 - 393 u/L   COVID only    Collection Time: 08/21/22 11:28 AM    Specimen: Nose; Nares   Result Value Ref Range    SARS-CoV-2 Negative Negative   C-reactive protein    Collection Time: 08/21/22 11:28 AM   Result Value Ref Range    CRP 89 0 (H) <3 0 mg/L   UA (URINE) with reflex to Scope    Collection Time: 08/21/22  1:03 PM   Result Value Ref Range    Color, UA Yellow     Clarity, UA Clear     Specific Gravity, UA 1 025 1 000 - 1 030    pH, UA 6 0 5 0, 5 5, 6 0, 6 5, 7 0, 7 5, 8 0, 8 5, 9 0    Leukocytes, UA Negative Negative    Nitrite, UA Negative Negative    Protein, UA 30 (1+) (A) Negative mg/dl    Glucose, UA Negative Negative mg/dl    Ketones, UA 40 (2+) (A) Negative mg/dl    Urobilinogen, UA 0 2 0 2, 1 0 E U /dl E U /dl    Bilirubin, UA Interference- unable to analyze (A) Negative    Occult Blood, UA Small (A) Negative   Urine Microscopic    Collection Time: 08/21/22  1:03 PM   Result Value Ref Range    RBC, UA 2-4 None Seen, 0-1, 1-2, 2-4, 0-5 /hpf    WBC, UA 2-4 None Seen, 0-1, 1-2, 0-5, 2-4 /hpf    Epithelial Cells None Seen None Seen, Occasional /hpf    Bacteria, UA Occasional None Seen, Occasional /hpf   Clostridium difficile toxin by PCR with EIA    Collection Time: 08/21/22  7:59 PM    Specimen: Rectum; Stool   Result Value Ref Range    C difficile toxin by PCR Negative Negative   Stool Enteric Bacterial Panel by PCR    Collection Time: 08/21/22  7:59 PM    Specimen: Rectum; Stool   Result Value Ref Range    Salmonella sp PCR None Detected None Detected    Shigella sp/Enteroinvasive E  coli (EIEC) PCR None Detected None Detected    Campylobacter sp (jejuni and coli) PCR None Detected None Detected    Shiga toxin 1/Shiga toxin 2 genes PCR None Detected None Detected   Comprehensive metabolic panel    Collection Time: 08/22/22  5:38 AM   Result Value Ref Range    Sodium 134 (L) 135 - 147 mmol/L    Potassium 4 6 3 5 - 5 3 mmol/L    Chloride 99 96 - 108 mmol/L    CO2 26 21 - 32 mmol/L    ANION GAP 9 4 - 13 mmol/L    BUN 12 5 - 25 mg/dL    Creatinine 1 09 0 60 - 1 30 mg/dL    Glucose 135 65 - 140 mg/dL    Calcium 9 0 8 3 - 10 1 mg/dL    AST 10 5 - 45 U/L    ALT 21 12 - 78 U/L    Alkaline Phosphatase 76 46 - 116 U/L    Total Protein 7 4 6 4 - 8 4 g/dL    Albumin 3 5 3 5 - 5 0 g/dL    Total Bilirubin 0 72 0 20 - 1 00 mg/dL    eGFR 91 ml/min/1 73sq m   CBC and differential    Collection Time: 08/22/22  5:38 AM   Result Value Ref Range    WBC 19 11 (H) 4 31 - 10 16 Thousand/uL    RBC 5 86 (H) 3 88 - 5 62 Million/uL    Hemoglobin 11 2 (L) 12 0 - 17 0 g/dL    Hematocrit 37 4 36 5 - 49 3 %    MCV 64 (L) 82 - 98 fL    MCH 19 1 (L) 26 8 - 34 3 pg    MCHC 29 9 (L) 31 4 - 37 4 g/dL    RDW 18 0 (H) 11 6 - 15 1 %    MPV 8 6 (L) 8 9 - 12 7 fL    Platelets 380 908 - 562 Thousands/uL    nRBC 0 /100 WBCs    Neutrophils Relative 94 (H) 43 - 75 %    Immat GRANS % 1 0 - 2 %    Lymphocytes Relative 3 (L) 14 - 44 %    Monocytes Relative 2 (L) 4 - 12 %    Eosinophils Relative 0 0 - 6 %    Basophils Relative 0 0 - 1 %    Neutrophils Absolute 18 08 (H) 1 85 - 7 62 Thousands/µL    Immature Grans Absolute 0 09 0 00 - 0 20 Thousand/uL    Lymphocytes Absolute 0 56 (L) 0 60 - 4 47 Thousands/µL    Monocytes Absolute 0 36 0 17 - 1 22 Thousand/µL    Eosinophils Absolute 0 00 0 00 - 0 61 Thousand/µL    Basophils Absolute 0 02 0 00 - 0 10 Thousands/µL   Magnesium    Collection Time: 08/22/22  5:38 AM   Result Value Ref Range    Magnesium 2 3 1 6 - 2 6 mg/dL   C-reactive protein    Collection Time: 08/22/22  5:38 AM   Result Value Ref Range     7 (H) <3 0 mg/L   Comprehensive metabolic panel    Collection Time: 08/23/22  5:33 AM   Result Value Ref Range    Sodium 136 135 - 147 mmol/L    Potassium 4 1 3 5 - 5 3 mmol/L    Chloride 100 96 - 108 mmol/L    CO2 28 21 - 32 mmol/L    ANION GAP 8 4 - 13 mmol/L    BUN 12 5 - 25 mg/dL    Creatinine 0 91 0 60 - 1 30 mg/dL    Glucose 102 65 - 140 mg/dL    Calcium 8 4 8 3 - 10 1 mg/dL    Corrected Calcium 9 2 8 3 - 10 1 mg/dL    AST 8 5 - 45 U/L    ALT 18 12 - 78 U/L    Alkaline Phosphatase 54 46 - 116 U/L    Total Protein 6 6 6 4 - 8 4 g/dL    Albumin 3 0 (L) 3 5 - 5 0 g/dL    Total Bilirubin 0 55 0 20 - 1 00 mg/dL    eGFR 113 ml/min/1 73sq m   CBC and differential    Collection Time: 08/23/22  5:33 AM   Result Value Ref Range    WBC 17 66 (H) 4 31 - 10 16 Thousand/uL    RBC 5 45 3 88 - 5 62 Million/uL    Hemoglobin 10 4 (L) 12 0 - 17 0 g/dL    Hematocrit 35 0 (L) 36 5 - 49 3 %    MCV 64 (L) 82 - 98 fL    MCH 19 1 (L) 26 8 - 34 3 pg    MCHC 29 7 (L) 31 4 - 37 4 g/dL    RDW 17 8 (H) 11 6 - 15 1 %    MPV 8 8 (L) 8 9 - 12 7 fL    Platelets 679 825 - 393 Thousands/uL    nRBC 0 /100 WBCs    Neutrophils Relative 80 (H) 43 - 75 %    Immat GRANS % 1 0 - 2 %    Lymphocytes Relative 8 (L) 14 - 44 %    Monocytes Relative 11 4 - 12 %    Eosinophils Relative 0 0 - 6 %    Basophils Relative 0 0 - 1 %    Neutrophils Absolute 14 20 (H) 1 85 - 7 62 Thousands/µL    Immature Grans Absolute 0 13 0 00 - 0 20 Thousand/uL    Lymphocytes Absolute 1 33 0 60 - 4 47 Thousands/µL    Monocytes Absolute 1 98 (H) 0 17 - 1 22 Thousand/µL    Eosinophils Absolute 0 00 0 00 - 0 61 Thousand/µL    Basophils Absolute 0 02 0 00 - 0 10 Thousands/µL   Magnesium    Collection Time: 08/23/22  5:33 AM   Result Value Ref Range    Magnesium 2 4 1 6 - 2 6 mg/dL

## 2022-08-24 PROBLEM — E87.6 HYPOKALEMIA: Status: RESOLVED | Noted: 2022-03-06 | Resolved: 2022-08-24

## 2022-08-24 PROBLEM — E87.1 HYPONATREMIA: Status: RESOLVED | Noted: 2019-01-05 | Resolved: 2022-08-24

## 2022-08-24 LAB
ANION GAP SERPL CALCULATED.3IONS-SCNC: 9 MMOL/L (ref 4–13)
BUN SERPL-MCNC: 11 MG/DL (ref 5–25)
CALCIUM SERPL-MCNC: 8.1 MG/DL (ref 8.3–10.1)
CHLORIDE SERPL-SCNC: 103 MMOL/L (ref 96–108)
CO2 SERPL-SCNC: 26 MMOL/L (ref 21–32)
CREAT SERPL-MCNC: 1.09 MG/DL (ref 0.6–1.3)
ERYTHROCYTE [DISTWIDTH] IN BLOOD BY AUTOMATED COUNT: 17.1 % (ref 11.6–15.1)
GFR SERPL CREATININE-BSD FRML MDRD: 91 ML/MIN/1.73SQ M
GLUCOSE SERPL-MCNC: 89 MG/DL (ref 65–140)
HCT VFR BLD AUTO: 32.4 % (ref 36.5–49.3)
HGB BLD-MCNC: 9.6 G/DL (ref 12–17)
MCH RBC QN AUTO: 19 PG (ref 26.8–34.3)
MCHC RBC AUTO-ENTMCNC: 29.6 G/DL (ref 31.4–37.4)
MCV RBC AUTO: 64 FL (ref 82–98)
PLATELET # BLD AUTO: 306 THOUSANDS/UL (ref 149–390)
PMV BLD AUTO: 8.6 FL (ref 8.9–12.7)
POTASSIUM SERPL-SCNC: 3.7 MMOL/L (ref 3.5–5.3)
RBC # BLD AUTO: 5.06 MILLION/UL (ref 3.88–5.62)
SODIUM SERPL-SCNC: 138 MMOL/L (ref 135–147)
WBC # BLD AUTO: 7.66 THOUSAND/UL (ref 4.31–10.16)

## 2022-08-24 PROCEDURE — 80048 BASIC METABOLIC PNL TOTAL CA: CPT | Performed by: FAMILY MEDICINE

## 2022-08-24 PROCEDURE — 99232 SBSQ HOSP IP/OBS MODERATE 35: CPT | Performed by: FAMILY MEDICINE

## 2022-08-24 PROCEDURE — 99232 SBSQ HOSP IP/OBS MODERATE 35: CPT | Performed by: INTERNAL MEDICINE

## 2022-08-24 PROCEDURE — C9113 INJ PANTOPRAZOLE SODIUM, VIA: HCPCS | Performed by: FAMILY MEDICINE

## 2022-08-24 PROCEDURE — 85027 COMPLETE CBC AUTOMATED: CPT | Performed by: FAMILY MEDICINE

## 2022-08-24 RX ADMIN — PIPERACILLIN AND TAZOBACTAM 3.38 G: 36; 4.5 INJECTION, POWDER, FOR SOLUTION INTRAVENOUS at 00:44

## 2022-08-24 RX ADMIN — HYDROMORPHONE HYDROCHLORIDE 0.5 MG: 1 INJECTION, SOLUTION INTRAMUSCULAR; INTRAVENOUS; SUBCUTANEOUS at 00:44

## 2022-08-24 RX ADMIN — HYDROMORPHONE HYDROCHLORIDE 0.5 MG: 1 INJECTION, SOLUTION INTRAMUSCULAR; INTRAVENOUS; SUBCUTANEOUS at 23:52

## 2022-08-24 RX ADMIN — DULOXETINE HYDROCHLORIDE 60 MG: 60 CAPSULE, DELAYED RELEASE ORAL at 09:14

## 2022-08-24 RX ADMIN — PANTOPRAZOLE SODIUM 40 MG: 40 INJECTION, POWDER, FOR SOLUTION INTRAVENOUS at 09:14

## 2022-08-24 RX ADMIN — HYDROMORPHONE HYDROCHLORIDE 0.5 MG: 1 INJECTION, SOLUTION INTRAMUSCULAR; INTRAVENOUS; SUBCUTANEOUS at 09:13

## 2022-08-24 RX ADMIN — PIPERACILLIN AND TAZOBACTAM 3.38 G: 36; 4.5 INJECTION, POWDER, FOR SOLUTION INTRAVENOUS at 17:52

## 2022-08-24 RX ADMIN — ONDANSETRON 4 MG: 2 INJECTION INTRAMUSCULAR; INTRAVENOUS at 22:19

## 2022-08-24 RX ADMIN — PIPERACILLIN AND TAZOBACTAM 3.38 G: 36; 4.5 INJECTION, POWDER, FOR SOLUTION INTRAVENOUS at 12:33

## 2022-08-24 RX ADMIN — ONDANSETRON 4 MG: 2 INJECTION INTRAMUSCULAR; INTRAVENOUS at 09:13

## 2022-08-24 RX ADMIN — SODIUM CHLORIDE 75 ML/HR: 0.9 INJECTION, SOLUTION INTRAVENOUS at 09:15

## 2022-08-24 RX ADMIN — HYDROMORPHONE HYDROCHLORIDE 0.5 MG: 1 INJECTION, SOLUTION INTRAMUSCULAR; INTRAVENOUS; SUBCUTANEOUS at 04:44

## 2022-08-24 RX ADMIN — HYDROMORPHONE HYDROCHLORIDE 0.5 MG: 1 INJECTION, SOLUTION INTRAMUSCULAR; INTRAVENOUS; SUBCUTANEOUS at 22:16

## 2022-08-24 RX ADMIN — Medication 125 MG: at 21:33

## 2022-08-24 RX ADMIN — HYDROMORPHONE HYDROCHLORIDE 0.5 MG: 1 INJECTION, SOLUTION INTRAMUSCULAR; INTRAVENOUS; SUBCUTANEOUS at 19:00

## 2022-08-24 RX ADMIN — HYDROMORPHONE HYDROCHLORIDE 0.5 MG: 1 INJECTION, SOLUTION INTRAMUSCULAR; INTRAVENOUS; SUBCUTANEOUS at 15:36

## 2022-08-24 RX ADMIN — PANTOPRAZOLE SODIUM 40 MG: 40 INJECTION, POWDER, FOR SOLUTION INTRAVENOUS at 21:33

## 2022-08-24 RX ADMIN — PIPERACILLIN AND TAZOBACTAM 3.38 G: 36; 4.5 INJECTION, POWDER, FOR SOLUTION INTRAVENOUS at 23:45

## 2022-08-24 RX ADMIN — SODIUM CHLORIDE 75 ML/HR: 0.9 INJECTION, SOLUTION INTRAVENOUS at 23:45

## 2022-08-24 RX ADMIN — HYDROMORPHONE HYDROCHLORIDE 0.5 MG: 1 INJECTION, SOLUTION INTRAMUSCULAR; INTRAVENOUS; SUBCUTANEOUS at 12:31

## 2022-08-24 RX ADMIN — PIPERACILLIN AND TAZOBACTAM 3.38 G: 36; 4.5 INJECTION, POWDER, FOR SOLUTION INTRAVENOUS at 05:15

## 2022-08-24 RX ADMIN — Medication 125 MG: at 09:22

## 2022-08-24 NOTE — ASSESSMENT & PLAN NOTE
· Patient with evidence of hyponatremia and hypochloremia likely secondary to diarrhea and volume depletion    Sodium level now normalized

## 2022-08-24 NOTE — ASSESSMENT & PLAN NOTE
Patient with prior history of C diff  · Current c diff testing negative  Continue with oral vancomycin for c diff prophylaxis while patient is on antibiotic therapy

## 2022-08-24 NOTE — ASSESSMENT & PLAN NOTE
Patient presenting with intractable abdominal pain  Patient with a history of ulcerative colitis status post total proctocolectomy with J-pouch ileoanal anastomosis, anastomotic stricture and persistent inflammation as well as questionable pouchitis and possible underlying Crohn's disease  · CT scan of the abdomen and pelvis showed moderate distension of ileal pouch with no obvious inflammation noted  · Patient continued on IV Zosyn therapy at this time  · Solumedrol was discontinued by GI  · Was NPO, started on clear liquids    Will continue to advance as tolerated  · Appreciate GI input

## 2022-08-24 NOTE — ASSESSMENT & PLAN NOTE
Patient with evidence of leukocytosis with a white blood cell count of 26 61 on admission   · Possibly reactive, possibly due to recent prolonged steroid taper from recent hospitalization  Patient also received solumedrol  · Leukocytosis has trended down and now within normal limits  · As noted above, continue on IV Zosyn therapy in addition to oral vancomycin

## 2022-08-24 NOTE — PROGRESS NOTES
[address]  Progress Note - Gabriella Gera 1995, 32 y o  male MRN: [id number]  Unit/Bed#: [address] [place] [address] Encounter: [id number]  Primary Care Provider: Librado Phillips DO   Date and time admitted to hospital: 8/21/2022 10:07 AM    * Intractable abdominal pain  Assessment & Plan  Patient presented with intractable abdominal pain  Patient with a history of ulcerative colitis status post total proctocolectomy with J-pouch ileoanal anastomosis, anastomotic stricture and persistent inflammation as well as questionable pouchitis and possible underlying Crohn's disease  · CT scan abdomen/pelvis showed moderate distension of ileal pouch with no obvious inflammation noted  · Continue IV Zosyn therapy at this time  · Solumedrol was discontinued by GI  · Was NPO, currently on clear liquids  Will continue to advance as tolerated  · Tentative plan for pouchoscopy tomorrow if no improvement  · Appreciate GI input    Leukocytosis  Assessment & Plan  Patient with evidence of leukocytosis with a white blood cell count of 26 61 on admission   · Possibly reactive, possibly due to recent prolonged steroid taper from recent hospitalization  Patient also received solumedrol  · Leukocytosis has trended down and now within normal limits  · As noted above, continue on IV Zosyn therapy in addition to oral vancomycin  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[facility] [address] )  Assessment & Plan  Continue IV Zosyn  No indication for steroids per GI    Elevated C-reactive protein (CRP)  Assessment & Plan   7 on lab work on 08/22    History of Clostridium difficile infection  Assessment & Plan  Patient with prior history of C diff  · Current c diff testing negative  Continue with oral vancomycin for c diff prophylaxis while patient is on antibiotic therapy      Hypokalemia-resolved as of 8/24/2022  Assessment & Plan  Potassium level within normal limits    Hyponatremia-resolved as of 8/24/2022  Assessment & Plan  · Patient with evidence of hyponatremia and hypochloremia likely secondary to diarrhea and volume depletion  Sodium level now normalized      VTE Pharmacologic Prophylaxis: VTE Score: 2 Low Risk (Score 0-2) - Encourage Ambulation  Patient Centered Rounds: I performed bedside rounds with nursing staff today  Discussions with Specialists or Other Care Team Provider: yes - GI    Education and Discussions with Family / Patient: yes  Time Spent for Care: 20 minutes  More than 50% of total time spent on counseling and coordination of care as described above  Current Length of Stay: 2 day(s)  Current Patient Status: Inpatient   Certification Statement: The patient will continue to require additional inpatient hospital stay due to Abdominal pain, pouchitis requiring IV antibiotics and slow advancement of diet  Discharge Plan: Anticipate discharge in 48-72 hrs to home  Code Status: Level 1 - Full Code    Subjective:     Continues to report abdominal pain which did improve last night however worsened again today  Tolerating clears    Objective:     Vitals:   Temp (24hrs), Av 5 °F (36 4 °C), Min:97 4 °F (36 3 °C), Max:97 7 °F (36 5 °C)    Temp:  [97 4 °F (36 3 °C)-97 7 °F (36 5 °C)] 97 4 °F (36 3 °C)  HR:  [69-74] 74  Resp:  [18-22] 18  BP: (108-129)/(68-90) 108/68  SpO2:  [93 %-96 %] 96 %  Body mass index is 27 02 kg/m²  Input and Output Summary (last 24 hours): Intake/Output Summary (Last 24 hours) at 2022 0934  Last data filed at 2022 0915  Gross per 24 hour   Intake 950 ml   Output --   Net 950 ml       Physical Exam:   Physical Exam  Vitals reviewed  Constitutional:       General: He is not in acute distress  Appearance: He is not ill-appearing  HENT:      Head: Normocephalic and atraumatic  Eyes:      General:         Right eye: No discharge  Left eye: No discharge  Extraocular Movements: Extraocular movements intact     Cardiovascular:      Rate and Rhythm: Normal rate and regular rhythm  Pulmonary:      Effort: Pulmonary effort is normal  No respiratory distress  Breath sounds: Normal breath sounds  No wheezing or rales  Abdominal:      General: Bowel sounds are normal  There is no distension  Palpations: Abdomen is soft  Tenderness: There is abdominal tenderness (Generalized)  There is no guarding  Neurological:      Mental Status: He is alert and oriented to person, place, and time  Additional Data:     Labs:  Results from last 7 days   Lab Units 08/24/22  0615 08/23/22  0533   WBC Thousand/uL 7 66 17 66*   HEMOGLOBIN g/dL 9 6* 10 4*   HEMATOCRIT % 32 4* 35 0*   PLATELETS Thousands/uL 306 323   NEUTROS PCT %  --  80*   LYMPHS PCT %  --  8*   MONOS PCT %  --  11   EOS PCT %  --  0     Results from last 7 days   Lab Units 08/24/22  0615 08/23/22  0533   SODIUM mmol/L 138 136   POTASSIUM mmol/L 3 7 4 1   CHLORIDE mmol/L 103 100   CO2 mmol/L 26 28   BUN mg/dL 11 12   CREATININE mg/dL 1 09 0 91   ANION GAP mmol/L 9 8   CALCIUM mg/dL 8 1* 8 4   ALBUMIN g/dL  --  3 0*   TOTAL BILIRUBIN mg/dL  --  0 55   ALK PHOS U/L  --  54   ALT U/L  --  18   AST U/L  --  8   GLUCOSE RANDOM mg/dL 89 102                 Results from last 7 days   Lab Units 08/21/22  1128   LACTIC ACID mmol/L 0 8       Lines/Drains:  Invasive Devices  Report    Peripheral Intravenous Line  Duration           Peripheral IV 08/23/22 Right;Ventral (anterior) Forearm <1 day              Imaging: No pertinent imaging reviewed  Recent Cultures (last 7 days):   Results from last 7 days   Lab Units 08/21/22 1959 08/21/22  1128   BLOOD CULTURE   --  No Growth at 48 hrs  No Growth at 48 hrs     C DIFF TOXIN B BY PCR  Negative  --        Last 24 Hours Medication List:   Current Facility-Administered Medications   Medication Dose Route Frequency Provider Last Rate    acetaminophen  650 mg Oral Q6H PRN Larayne Dakin, MD      DULoxetine  60 mg Oral Daily Larayne Dakin, MD     [facility] HYDROmorphone  0 5 mg Intravenous Q3H PRN Park Hernadez MD      ondansetron  4 mg Intravenous Q6H PRN Park Hernadez MD      pantoprazole  40 mg Intravenous Q12H [facility] Park Hernadez MD      piperacillin-tazobactam  3 375 g Intravenous Texie Bosworth, MD 3 375 g (08/24/22 0515)    sodium chloride  75 mL/hr Intravenous Continuous Park Hernadez MD 75 mL/hr (08/24/22 0915)    vancomycin  125 mg Oral Q12H [address], PA-C          Today, Patient Was Seen By: Jojo Hardwick DO    **Please Note: This note may have been constructed using a voice recognition system  **

## 2022-08-24 NOTE — ASSESSMENT & PLAN NOTE
Patient with a previous history of C diff  · Current c diff testing negative  Continue with oral vancomycin for c diff prophylaxis while patient is on antibiotic therapy

## 2022-08-24 NOTE — ASSESSMENT & PLAN NOTE
· Patient with evidence of hyponatremia and hypochloremia likely secondary to diarrhea and volume depletion  Sodium level within normal limits  · Repeat labs in a m

## 2022-08-24 NOTE — PROGRESS NOTES
[address]   Progress Note - Fawad Parrishgustabo 1995, 32 y o  male MRN: [id number]  Unit/Bed#: [address] [place] [address] Encounter: [id number]  Primary Care Provider: Ladonna Lozada DO   Date and time admitted to hospital: 8/21/2022 10:07 AM    * Intractable abdominal pain  Assessment & Plan  Patient presenting with intractable abdominal pain  Patient with a history of ulcerative colitis status post total proctocolectomy with J-pouch ileoanal anastomosis, anastomotic stricture and persistent inflammation as well as questionable pouchitis and possible underlying Crohn's disease  · CT scan of the abdomen and pelvis showed moderate distension of ileal pouch with no obvious inflammation noted  · Patient continued on IV Zosyn therapy at this time  · Solumedrol was discontinued by GI  · Was NPO, started on clear liquids  Will continue to advance as tolerated  · Appreciate GI input    Leukocytosis  Assessment & Plan  Patient with evidence of leukocytosis with a white blood cell count of 26 61 on admission   · Possibly reactive, possibly due to recent prolonged steroid taper from recent hospitalization  Patient also received solumedrol  · As noted above, continue on IV Zosyn therapy in addition to oral vancomycin  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[facility] [address] )  Assessment & Plan  Continue IV Zosyn  No indication for steroids per GI    Elevated C-reactive protein (CRP)  Assessment & Plan   7 on lab work yesterday    History of Clostridium difficile infection  Assessment & Plan  Patient with a previous history of C diff  · Current c diff testing negative  Continue with oral vancomycin for c diff prophylaxis while patient is on antibiotic therapy  Hypokalemia  Assessment & Plan  Potassium level within normal limits    Hyponatremia  Assessment & Plan  · Patient with evidence of hyponatremia and hypochloremia likely secondary to diarrhea and volume depletion    Sodium level within normal limits  · Repeat labs in a  m  VTE Pharmacologic Prophylaxis: VTE Score: 2 Low Risk (Score 0-2) - Encourage Ambulation  Patient Centered Rounds: I performed bedside rounds with nursing staff today  Discussions with Specialists or Other Care Team Provider: yes - GI    Education and Discussions with Family / Patient: Yes  Time Spent for Care: 30 minutes  More than 50% of total time spent on counseling and coordination of care as described above  Current Length of Stay: 1 day(s)  Current Patient Status: Inpatient   Certification Statement: The patient will continue to require additional inpatient hospital stay due to Intractable abdominal pain, pouchitis requiring IV antibiotics and slow advancement of diet  Discharge Plan: Anticipate discharge in 48-72 hrs to home  Code Status: Level 1 - Full Code    Subjective:     Patient continues to report abdominal pain with some improvement compared to before    Objective:     Vitals:   Temp (24hrs), Av 6 °F (36 4 °C), Min:97 4 °F (36 3 °C), Max:97 7 °F (36 5 °C)    Temp:  [97 4 °F (36 3 °C)-97 7 °F (36 5 °C)] 97 5 °F (36 4 °C)  HR:  [65-83] 73  Resp:  [17-22] 22  BP: (116-135)/(69-90) 128/90  SpO2:  [93 %-97 %] 94 %  Body mass index is 27 02 kg/m²  Input and Output Summary (last 24 hours):   No intake or output data in the 24 hours ending 22    Physical Exam:   Physical Exam  Vitals reviewed  Constitutional:       General: He is not in acute distress  Appearance: He is not ill-appearing  HENT:      Head: Normocephalic and atraumatic  Eyes:      General:         Right eye: No discharge  Left eye: No discharge  Extraocular Movements: Extraocular movements intact  Cardiovascular:      Rate and Rhythm: Normal rate and regular rhythm  Pulmonary:      Effort: Pulmonary effort is normal  No respiratory distress  Breath sounds: Normal breath sounds  No wheezing or rales     Abdominal:      General: Bowel sounds are normal  There is no distension  Palpations: Abdomen is soft  Tenderness: There is abdominal tenderness (mild, generalized)  There is no guarding  Neurological:      Mental Status: He is alert and oriented to person, place, and time  Additional Data:     Labs:  Results from last 7 days   Lab Units 08/23/22  0533   WBC Thousand/uL 17 66*   HEMOGLOBIN g/dL 10 4*   HEMATOCRIT % 35 0*   PLATELETS Thousands/uL 323   NEUTROS PCT % 80*   LYMPHS PCT % 8*   MONOS PCT % 11   EOS PCT % 0     Results from last 7 days   Lab Units 08/23/22  0533   SODIUM mmol/L 136   POTASSIUM mmol/L 4 1   CHLORIDE mmol/L 100   CO2 mmol/L 28   BUN mg/dL 12   CREATININE mg/dL 0 91   ANION GAP mmol/L 8   CALCIUM mg/dL 8 4   ALBUMIN g/dL 3 0*   TOTAL BILIRUBIN mg/dL 0 55   ALK PHOS U/L 54   ALT U/L 18   AST U/L 8   GLUCOSE RANDOM mg/dL 102                 Results from last 7 days   Lab Units 08/21/22  1128   LACTIC ACID mmol/L 0 8       Lines/Drains:  Invasive Devices  Report    Peripheral Intravenous Line  Duration           Peripheral IV 08/23/22 Right;Ventral (anterior) Forearm <1 day                Imaging: Reviewed radiology reports from this admission including: abdominal/pelvic CT    Recent Cultures (last 7 days):   Results from last 7 days   Lab Units 08/21/22  1959 08/21/22  1128   BLOOD CULTURE   --  No Growth at 48 hrs  No Growth at 48 hrs     C DIFF TOXIN B BY PCR  Negative  --        Last 24 Hours Medication List:   Current Facility-Administered Medications   Medication Dose Route Frequency Provider Last Rate    acetaminophen  650 mg Oral Q6H PRN Jhonatan Hart MD      DULoxetine  60 mg Oral Daily Jhonatan Hart MD      HYDROmorphone  0 5 mg Intravenous Q3H PRN Jhonatan Hart MD      ondansetron  4 mg Intravenous Q6H PRN Jhonatan Hart MD      pantoprazole  40 mg Intravenous Q12H [address] Jhonatan Hart MD      piperacillin-tazobactam  3 375 g Intravenous Pauline Murillo MD 3 375 g (08/23/22 9455)    sodium chloride  75 mL/hr Intravenous Continuous Josh An MD 75 mL/hr (08/23/22 7894)    vancomycin  125 mg Oral Q12H [address] Mitesh Castillo PA-C          Today, Patient Was Seen By: Jane Lion DO    **Please Note: This note may have been constructed using a voice recognition system  **

## 2022-08-24 NOTE — ASSESSMENT & PLAN NOTE
Patient with evidence of leukocytosis with a white blood cell count of 26 61 on admission   · Possibly reactive, possibly due to recent prolonged steroid taper from recent hospitalization  Patient also received solumedrol  · As noted above, continue on IV Zosyn therapy in addition to oral vancomycin

## 2022-08-24 NOTE — PLAN OF CARE
Problem: PAIN - ADULT  Goal: Verbalizes/displays adequate comfort level or baseline comfort level  Description: Interventions:  - Encourage patient to monitor pain and request assistance  - Assess pain using appropriate pain scale  - Administer analgesics based on type and severity of pain and evaluate response  - Implement non-pharmacological measures as appropriate and evaluate response  - Consider cultural and social influences on pain and pain management  - Notify physician/advanced practitioner if interventions unsuccessful or patient reports new pain  Outcome: Progressing     Problem: INFECTION - ADULT  Goal: Absence of fever/infection during neutropenic period  Description: INTERVENTIONS:  - Monitor WBC    Outcome: Progressing     Problem: GASTROINTESTINAL - ADULT  Goal: Minimal or absence of nausea and/or vomiting  Description: INTERVENTIONS:  - Administer IV fluids if ordered to ensure adequate hydration  - Administer ordered antiemetic medications as needed  - Provide nonpharmacologic comfort measures as appropriate  - Advance diet as tolerated, if ordered  - Consider nutrition services referral to assist patient with adequate nutrition and appropriate food choices  Outcome: Progressing

## 2022-08-24 NOTE — ASSESSMENT & PLAN NOTE
Patient presented with intractable abdominal pain  Patient with a history of ulcerative colitis status post total proctocolectomy with J-pouch ileoanal anastomosis, anastomotic stricture and persistent inflammation as well as questionable pouchitis and possible underlying Crohn's disease  · CT scan abdomen/pelvis showed moderate distension of ileal pouch with no obvious inflammation noted  · Continue IV Zosyn therapy at this time  · Solumedrol was discontinued by GI  · Was NPO, currently on clear liquids    Will continue to advance as tolerated  · Tentative plan for pouchoscopy tomorrow if no improvement  · Appreciate GI input

## 2022-08-25 ENCOUNTER — ANESTHESIA EVENT (INPATIENT)
Dept: PERIOP | Facility: HOSPITAL | Age: 27
DRG: 394 | End: 2022-08-25
Payer: COMMERCIAL

## 2022-08-25 ENCOUNTER — ANESTHESIA (INPATIENT)
Dept: PERIOP | Facility: HOSPITAL | Age: 27
DRG: 394 | End: 2022-08-25
Payer: COMMERCIAL

## 2022-08-25 ENCOUNTER — APPOINTMENT (OUTPATIENT)
Dept: PERIOP | Facility: HOSPITAL | Age: 27
DRG: 394 | End: 2022-08-25
Payer: COMMERCIAL

## 2022-08-25 PROCEDURE — 44386 ENDOSCOPY BOWEL POUCH/BIOP: CPT | Performed by: INTERNAL MEDICINE

## 2022-08-25 PROCEDURE — 99232 SBSQ HOSP IP/OBS MODERATE 35: CPT | Performed by: FAMILY MEDICINE

## 2022-08-25 PROCEDURE — 0DBB8ZX EXCISION OF ILEUM, VIA NATURAL OR ARTIFICIAL OPENING ENDOSCOPIC, DIAGNOSTIC: ICD-10-PCS | Performed by: INTERNAL MEDICINE

## 2022-08-25 PROCEDURE — C9113 INJ PANTOPRAZOLE SODIUM, VIA: HCPCS | Performed by: FAMILY MEDICINE

## 2022-08-25 PROCEDURE — 88305 TISSUE EXAM BY PATHOLOGIST: CPT | Performed by: PATHOLOGY

## 2022-08-25 RX ORDER — PROPOFOL 10 MG/ML
INJECTION, EMULSION INTRAVENOUS CONTINUOUS PRN
Status: DISCONTINUED | OUTPATIENT
Start: 2022-08-25 | End: 2022-08-25

## 2022-08-25 RX ORDER — LIDOCAINE HYDROCHLORIDE 10 MG/ML
INJECTION, SOLUTION EPIDURAL; INFILTRATION; INTRACAUDAL; PERINEURAL AS NEEDED
Status: DISCONTINUED | OUTPATIENT
Start: 2022-08-25 | End: 2022-08-25

## 2022-08-25 RX ORDER — PROPOFOL 10 MG/ML
INJECTION, EMULSION INTRAVENOUS AS NEEDED
Status: DISCONTINUED | OUTPATIENT
Start: 2022-08-25 | End: 2022-08-25

## 2022-08-25 RX ORDER — SODIUM CHLORIDE, SODIUM LACTATE, POTASSIUM CHLORIDE, CALCIUM CHLORIDE 600; 310; 30; 20 MG/100ML; MG/100ML; MG/100ML; MG/100ML
INJECTION, SOLUTION INTRAVENOUS CONTINUOUS PRN
Status: DISCONTINUED | OUTPATIENT
Start: 2022-08-25 | End: 2022-08-25

## 2022-08-25 RX ADMIN — PIPERACILLIN AND TAZOBACTAM 3.38 G: 36; 4.5 INJECTION, POWDER, FOR SOLUTION INTRAVENOUS at 05:18

## 2022-08-25 RX ADMIN — DULOXETINE HYDROCHLORIDE 60 MG: 60 CAPSULE, DELAYED RELEASE ORAL at 09:26

## 2022-08-25 RX ADMIN — PANTOPRAZOLE SODIUM 40 MG: 40 INJECTION, POWDER, FOR SOLUTION INTRAVENOUS at 20:15

## 2022-08-25 RX ADMIN — HYDROMORPHONE HYDROCHLORIDE 0.5 MG: 1 INJECTION, SOLUTION INTRAMUSCULAR; INTRAVENOUS; SUBCUTANEOUS at 20:15

## 2022-08-25 RX ADMIN — Medication 125 MG: at 13:13

## 2022-08-25 RX ADMIN — LIDOCAINE HYDROCHLORIDE 50 MG: 10 INJECTION, SOLUTION EPIDURAL; INFILTRATION; INTRACAUDAL; PERINEURAL at 12:19

## 2022-08-25 RX ADMIN — HYDROMORPHONE HYDROCHLORIDE 0.5 MG: 1 INJECTION, SOLUTION INTRAMUSCULAR; INTRAVENOUS; SUBCUTANEOUS at 17:13

## 2022-08-25 RX ADMIN — ONDANSETRON 4 MG: 2 INJECTION INTRAMUSCULAR; INTRAVENOUS at 23:16

## 2022-08-25 RX ADMIN — HYDROMORPHONE HYDROCHLORIDE 0.5 MG: 1 INJECTION, SOLUTION INTRAMUSCULAR; INTRAVENOUS; SUBCUTANEOUS at 13:14

## 2022-08-25 RX ADMIN — HYDROMORPHONE HYDROCHLORIDE 0.5 MG: 1 INJECTION, SOLUTION INTRAMUSCULAR; INTRAVENOUS; SUBCUTANEOUS at 05:23

## 2022-08-25 RX ADMIN — PIPERACILLIN AND TAZOBACTAM 3.38 G: 36; 4.5 INJECTION, POWDER, FOR SOLUTION INTRAVENOUS at 18:46

## 2022-08-25 RX ADMIN — Medication 125 MG: at 23:05

## 2022-08-25 RX ADMIN — SODIUM CHLORIDE, SODIUM LACTATE, POTASSIUM CHLORIDE, AND CALCIUM CHLORIDE: .6; .31; .03; .02 INJECTION, SOLUTION INTRAVENOUS at 12:17

## 2022-08-25 RX ADMIN — PIPERACILLIN AND TAZOBACTAM 3.38 G: 36; 4.5 INJECTION, POWDER, FOR SOLUTION INTRAVENOUS at 23:16

## 2022-08-25 RX ADMIN — ONDANSETRON 4 MG: 2 INJECTION INTRAMUSCULAR; INTRAVENOUS at 13:14

## 2022-08-25 RX ADMIN — SODIUM CHLORIDE 75 ML/HR: 0.9 INJECTION, SOLUTION INTRAVENOUS at 17:17

## 2022-08-25 RX ADMIN — PIPERACILLIN AND TAZOBACTAM 3.38 G: 36; 4.5 INJECTION, POWDER, FOR SOLUTION INTRAVENOUS at 13:14

## 2022-08-25 RX ADMIN — PROPOFOL 100 MG: 10 INJECTION, EMULSION INTRAVENOUS at 12:19

## 2022-08-25 RX ADMIN — HYDROMORPHONE HYDROCHLORIDE 0.5 MG: 1 INJECTION, SOLUTION INTRAMUSCULAR; INTRAVENOUS; SUBCUTANEOUS at 09:26

## 2022-08-25 RX ADMIN — PROPOFOL 100 MCG/KG/MIN: 10 INJECTION, EMULSION INTRAVENOUS at 12:19

## 2022-08-25 RX ADMIN — PANTOPRAZOLE SODIUM 40 MG: 40 INJECTION, POWDER, FOR SOLUTION INTRAVENOUS at 09:26

## 2022-08-25 RX ADMIN — HYDROMORPHONE HYDROCHLORIDE 0.5 MG: 1 INJECTION, SOLUTION INTRAMUSCULAR; INTRAVENOUS; SUBCUTANEOUS at 23:16

## 2022-08-25 NOTE — PERIOPERATIVE NURSING NOTE
[facility], Patient spoke with Dr Oksana Srinivasan post procedure, Patient transferred back to , report given to [facility]

## 2022-08-25 NOTE — ANESTHESIA POSTPROCEDURE EVALUATION
Post-Op Assessment Note    CV Status:  Stable  Pain Score: 0    Pain management: adequate     Mental Status:  Awake   Hydration Status:  Stable   PONV Controlled:  None   Airway Patency:  Patent      Post Op Vitals Reviewed: Yes      Staff: CRNA   Comments: spontaneously breathing, HOB @ 30 dgrees, vss, fully endorsed to recovery w/o AC        No complications documented      BP   118/73   Temp      Pulse  60   Resp   15   SpO2   98

## 2022-08-25 NOTE — ANESTHESIA PREPROCEDURE EVALUATION
Procedure:  FLEXIBLE SIGMOIDOSCOPY    Relevant Problems   ANESTHESIA (within normal limits)      CARDIO (within normal limits)      GI/HEPATIC   (+) Partial small bowel obstruction (HCC)      HEMATOLOGY   (+) Anemia      MUSCULOSKELETAL   (+) Ankylosing spondylitis (HCC)   (+) Left-sided low back pain without sciatica      NEURO/PSYCH   (+) CAR (generalized anxiety disorder)   (+) History of Clostridium difficile infection   (+) History of ulcerative colitis      PULMONARY  Covid positive        Physical Exam    Airway    Mallampati score: I  TM Distance: >3 FB  Neck ROM: full     Dental   No notable dental hx     Cardiovascular  Rhythm: regular, Rate: normal,     Pulmonary  Breath sounds clear to auscultation,     Other Findings        Anesthesia Plan  ASA Score- 2     Anesthesia Type- IV sedation with anesthesia with ASA Monitors  Additional Monitors:   Airway Plan:           Plan Factors-Exercise tolerance (METS): >4 METS  Chart reviewed  Existing labs reviewed  Patient summary reviewed  Patient is not a current smoker  Induction-     Postoperative Plan-     Informed Consent- Anesthetic plan and risks discussed with patient  I personally reviewed this patient with the CRNA  Discussed and agreed on the Anesthesia Plan with the CRNA  Priscila Arceo

## 2022-08-26 PROBLEM — D72.829 LEUKOCYTOSIS: Status: RESOLVED | Noted: 2019-01-05 | Resolved: 2022-08-26

## 2022-08-26 LAB
ANION GAP SERPL CALCULATED.3IONS-SCNC: 7 MMOL/L (ref 4–13)
BACTERIA BLD CULT: NORMAL
BACTERIA BLD CULT: NORMAL
BUN SERPL-MCNC: 8 MG/DL (ref 5–25)
CALCIUM SERPL-MCNC: 7.9 MG/DL (ref 8.3–10.1)
CHLORIDE SERPL-SCNC: 105 MMOL/L (ref 96–108)
CO2 SERPL-SCNC: 29 MMOL/L (ref 21–32)
CREAT SERPL-MCNC: 1.04 MG/DL (ref 0.6–1.3)
ERYTHROCYTE [DISTWIDTH] IN BLOOD BY AUTOMATED COUNT: 17 % (ref 11.6–15.1)
GFR SERPL CREATININE-BSD FRML MDRD: 96 ML/MIN/1.73SQ M
GLUCOSE SERPL-MCNC: 120 MG/DL (ref 65–140)
HCT VFR BLD AUTO: 34.1 % (ref 36.5–49.3)
HGB BLD-MCNC: 10.1 G/DL (ref 12–17)
MCH RBC QN AUTO: 19 PG (ref 26.8–34.3)
MCHC RBC AUTO-ENTMCNC: 29.6 G/DL (ref 31.4–37.4)
MCV RBC AUTO: 64 FL (ref 82–98)
PLATELET # BLD AUTO: 345 THOUSANDS/UL (ref 149–390)
PMV BLD AUTO: 9 FL (ref 8.9–12.7)
POTASSIUM SERPL-SCNC: 3.4 MMOL/L (ref 3.5–5.3)
RBC # BLD AUTO: 5.31 MILLION/UL (ref 3.88–5.62)
SODIUM SERPL-SCNC: 141 MMOL/L (ref 135–147)
WBC # BLD AUTO: 7.67 THOUSAND/UL (ref 4.31–10.16)

## 2022-08-26 PROCEDURE — 85027 COMPLETE CBC AUTOMATED: CPT | Performed by: FAMILY MEDICINE

## 2022-08-26 PROCEDURE — 99232 SBSQ HOSP IP/OBS MODERATE 35: CPT | Performed by: FAMILY MEDICINE

## 2022-08-26 PROCEDURE — 80048 BASIC METABOLIC PNL TOTAL CA: CPT | Performed by: FAMILY MEDICINE

## 2022-08-26 PROCEDURE — 99232 SBSQ HOSP IP/OBS MODERATE 35: CPT | Performed by: PHYSICIAN ASSISTANT

## 2022-08-26 PROCEDURE — C9113 INJ PANTOPRAZOLE SODIUM, VIA: HCPCS | Performed by: FAMILY MEDICINE

## 2022-08-26 RX ORDER — METRONIDAZOLE 500 MG/1
500 TABLET ORAL EVERY 8 HOURS SCHEDULED
Status: DISCONTINUED | OUTPATIENT
Start: 2022-08-26 | End: 2022-08-27 | Stop reason: HOSPADM

## 2022-08-26 RX ORDER — SACCHAROMYCES BOULARDII 250 MG
250 CAPSULE ORAL 2 TIMES DAILY
Status: DISCONTINUED | OUTPATIENT
Start: 2022-08-26 | End: 2022-08-27 | Stop reason: HOSPADM

## 2022-08-26 RX ORDER — POTASSIUM CHLORIDE 20 MEQ/1
40 TABLET, EXTENDED RELEASE ORAL ONCE
Status: COMPLETED | OUTPATIENT
Start: 2022-08-26 | End: 2022-08-26

## 2022-08-26 RX ADMIN — HYDROMORPHONE HYDROCHLORIDE 0.5 MG: 1 INJECTION, SOLUTION INTRAMUSCULAR; INTRAVENOUS; SUBCUTANEOUS at 05:47

## 2022-08-26 RX ADMIN — SODIUM CHLORIDE 75 ML/HR: 0.9 INJECTION, SOLUTION INTRAVENOUS at 05:22

## 2022-08-26 RX ADMIN — METRONIDAZOLE 500 MG: 500 TABLET ORAL at 21:22

## 2022-08-26 RX ADMIN — PIPERACILLIN AND TAZOBACTAM 3.38 G: 36; 4.5 INJECTION, POWDER, FOR SOLUTION INTRAVENOUS at 12:08

## 2022-08-26 RX ADMIN — HYDROMORPHONE HYDROCHLORIDE 0.5 MG: 1 INJECTION, SOLUTION INTRAMUSCULAR; INTRAVENOUS; SUBCUTANEOUS at 23:01

## 2022-08-26 RX ADMIN — METRONIDAZOLE 500 MG: 500 TABLET ORAL at 14:30

## 2022-08-26 RX ADMIN — SODIUM CHLORIDE 75 ML/HR: 0.9 INJECTION, SOLUTION INTRAVENOUS at 23:05

## 2022-08-26 RX ADMIN — DULOXETINE HYDROCHLORIDE 60 MG: 60 CAPSULE, DELAYED RELEASE ORAL at 09:03

## 2022-08-26 RX ADMIN — HYDROMORPHONE HYDROCHLORIDE 0.5 MG: 1 INJECTION, SOLUTION INTRAMUSCULAR; INTRAVENOUS; SUBCUTANEOUS at 19:43

## 2022-08-26 RX ADMIN — HYDROMORPHONE HYDROCHLORIDE 0.5 MG: 1 INJECTION, SOLUTION INTRAMUSCULAR; INTRAVENOUS; SUBCUTANEOUS at 02:41

## 2022-08-26 RX ADMIN — PANTOPRAZOLE SODIUM 40 MG: 40 INJECTION, POWDER, FOR SOLUTION INTRAVENOUS at 21:22

## 2022-08-26 RX ADMIN — HYDROMORPHONE HYDROCHLORIDE 0.5 MG: 1 INJECTION, SOLUTION INTRAMUSCULAR; INTRAVENOUS; SUBCUTANEOUS at 12:16

## 2022-08-26 RX ADMIN — ONDANSETRON 4 MG: 2 INJECTION INTRAMUSCULAR; INTRAVENOUS at 12:08

## 2022-08-26 RX ADMIN — ONDANSETRON 4 MG: 2 INJECTION INTRAMUSCULAR; INTRAVENOUS at 23:01

## 2022-08-26 RX ADMIN — Medication 250 MG: at 19:43

## 2022-08-26 RX ADMIN — POTASSIUM CHLORIDE 40 MEQ: 1500 TABLET, EXTENDED RELEASE ORAL at 10:00

## 2022-08-26 RX ADMIN — HYDROMORPHONE HYDROCHLORIDE 0.5 MG: 1 INJECTION, SOLUTION INTRAMUSCULAR; INTRAVENOUS; SUBCUTANEOUS at 16:16

## 2022-08-26 RX ADMIN — PANTOPRAZOLE SODIUM 40 MG: 40 INJECTION, POWDER, FOR SOLUTION INTRAVENOUS at 09:03

## 2022-08-26 RX ADMIN — PIPERACILLIN AND TAZOBACTAM 3.38 G: 36; 4.5 INJECTION, POWDER, FOR SOLUTION INTRAVENOUS at 05:21

## 2022-08-26 RX ADMIN — HYDROMORPHONE HYDROCHLORIDE 0.5 MG: 1 INJECTION, SOLUTION INTRAMUSCULAR; INTRAVENOUS; SUBCUTANEOUS at 09:03

## 2022-08-26 RX ADMIN — Medication 125 MG: at 09:53

## 2022-08-26 NOTE — ASSESSMENT & PLAN NOTE
Patient presented with intractable abdominal pain  Patient with a history of ulcerative colitis status post total proctocolectomy with J-pouch ileoanal anastomosis, anastomotic stricture and persistent inflammation as well as questionable pouchitis and possible underlying Crohn's disease  · CT scan abdomen/pelvis showed moderate distension of ileal pouch with no obvious inflammation noted  · Continue IV Zosyn  · Solumedrol was discontinued by GI  · Was NPO, then clear liquids    Advanced to low-fiber/low residue  · Status post pouchoscopy today which showed multiple small focal ulcers along with mild inflammation of proximal pouch  · Appreciate GI input

## 2022-08-26 NOTE — PLAN OF CARE
Problem: PAIN - ADULT  Goal: Verbalizes/displays adequate comfort level or baseline comfort level  Description: Interventions:  - Encourage patient to monitor pain and request assistance  - Assess pain using appropriate pain scale  - Administer analgesics based on type and severity of pain and evaluate response  - Implement non-pharmacological measures as appropriate and evaluate response  - Consider cultural and social influences on pain and pain management  - Notify physician/advanced practitioner if interventions unsuccessful or patient reports new pain  Outcome: Progressing     Problem: GASTROINTESTINAL - ADULT  Goal: Minimal or absence of nausea and/or vomiting  Description: INTERVENTIONS:  - Administer IV fluids if ordered to ensure adequate hydration  - Administer ordered antiemetic medications as needed  - Provide nonpharmacologic comfort measures as appropriate  - Advance diet as tolerated, if ordered  - Consider nutrition services referral to assist patient with adequate nutrition and appropriate food choices  Outcome: Progressing     Problem: INFECTION - ADULT  Goal: Absence of fever/infection during neutropenic period  Description: INTERVENTIONS:  - Monitor WBC    Outcome: Progressing

## 2022-08-26 NOTE — ASSESSMENT & PLAN NOTE
Patient presented with intractable abdominal pain  Patient with a history of ulcerative colitis status post total proctocolectomy with J-pouch ileoanal anastomosis, anastomotic stricture and persistent inflammation as well as questionable pouchitis and possible underlying Crohn's disease  · CT scan abdomen/pelvis showed moderate distension of ileal pouch with no obvious inflammation noted  · Patient reports diarrhea today likely due to Zosyn use  Discussed with GI and will discontinue Zosyn and vancomycin and start p o  Flagyl  · Solumedrol was discontinued by GI  · Was NPO, then clear liquids    Advanced to low-fiber/low residue which he is tolerating  · Status post pouchoscopy 8/25 which showed multiple small focal ulcers along with mild inflammation of proximal pouch  · Appreciate GI input

## 2022-08-26 NOTE — ASSESSMENT & PLAN NOTE
Patient with evidence of leukocytosis with a white blood cell count of 26 61 on admission   · Possibly reactive, possibly due to recent prolonged steroid taper from recent hospitalization   Patient also received Solu-Medrol  · WBC count now within normal limits

## 2022-08-26 NOTE — ASSESSMENT & PLAN NOTE
Patient with evidence of leukocytosis with a white blood cell count of 26 61 on admission   · Possibly reactive, possibly due to recent prolonged steroid taper from recent hospitalization  Patient also received Solu-Medrol  · Leukocytosis now within normal limits  · As noted above, continue on IV Zosyn therapy in addition to oral vancomycin

## 2022-08-26 NOTE — PLAN OF CARE
Problem: Potential for Falls  Goal: Patient will remain free of falls  Description: INTERVENTIONS:  - Educate patient/family on patient safety including physical limitations  - Instruct patient to call for assistance with activity   - Consult OT/PT to assist with strengthening/mobility   - Keep Call bell within reach  - Keep bed low and locked with side rails adjusted as appropriate  - Keep care items and personal belongings within reach  - Initiate and maintain comfort rounds  - Make Fall Risk Sign visible to staff  - Offer Toileting every 2 Hours, in advance of need  - Initiate/Maintain bed alarm  - Obtain necessary fall risk management equipment: call bell   - Apply yellow socks and bracelet for high fall risk patients  - Consider moving patient to room near nurses station  Outcome: Progressing     Problem: PAIN - ADULT  Goal: Verbalizes/displays adequate comfort level or baseline comfort level  Description: Interventions:  - Encourage patient to monitor pain and request assistance  - Assess pain using appropriate pain scale  - Administer analgesics based on type and severity of pain and evaluate response  - Implement non-pharmacological measures as appropriate and evaluate response  - Consider cultural and social influences on pain and pain management  - Notify physician/advanced practitioner if interventions unsuccessful or patient reports new pain  Outcome: Progressing     Problem: INFECTION - ADULT  Goal: Absence or prevention of progression during hospitalization  Description: INTERVENTIONS:  - Assess and monitor for signs and symptoms of infection  - Monitor lab/diagnostic results  - Monitor all insertion sites, i e  indwelling lines, tubes, and drains  - Monitor endotracheal if appropriate and nasal secretions for changes in amount and color  - [place] appropriate cooling/warming therapies per order  - Administer medications as ordered  - Instruct and encourage patient and family to use good hand hygiene technique  - Identify and instruct in appropriate isolation precautions for identified infection/condition  Outcome: Progressing  Goal: Absence of fever/infection during neutropenic period  Description: INTERVENTIONS:  - Monitor WBC    Outcome: Progressing     Problem: SAFETY ADULT  Goal: Patient will remain free of falls  Description: INTERVENTIONS:  - Educate patient/family on patient safety including physical limitations  - Instruct patient to call for assistance with activity   - Consult OT/PT to assist with strengthening/mobility   - Keep Call bell within reach  - Keep bed low and locked with side rails adjusted as appropriate  - Keep care items and personal belongings within reach  - Initiate and maintain comfort rounds  - Make Fall Risk Sign visible to staff  - Offer Toileting every 2 Hours, in advance of need  - Initiate/Maintain bed alarm  - Obtain necessary fall risk management equipment: call bell   - Apply yellow socks and bracelet for high fall risk patients  - Consider moving patient to room near nurses station  Outcome: Progressing  Goal: Maintain or return to baseline ADL function  Description: INTERVENTIONS:  -  Assess patient's ability to carry out ADLs; assess patient's baseline for ADL function and identify physical deficits which impact ability to perform ADLs (bathing, care of mouth/teeth, toileting, grooming, dressing, etc )  - Assess/evaluate cause of self-care deficits   - Assess range of motion  - Assess patient's mobility; develop plan if impaired  - Assess patient's need for assistive devices and provide as appropriate  - Encourage maximum independence but intervene and supervise when necessary  - Involve family in performance of ADLs  - Assess for home care needs following discharge   - Consider OT consult to assist with ADL evaluation and planning for discharge  - Provide patient education as appropriate  Outcome: Progressing  Goal: Maintains/Returns to pre admission functional level  Description: INTERVENTIONS:  - Perform BMAT or MOVE assessment daily    - Set and communicate daily mobility goal to care team and patient/family/caregiver  - Collaborate with rehabilitation services on mobility goals if consulted  - Perform Range of Motion 2 times a day  - Reposition patient every 2 hours  - Dangle patient 2 times a day  - Stand patient 2 times a day  - Ambulate patient 2 times a day  - Out of bed to chair 2 times a day   - Out of bed for meals 2 times a day  - Out of bed for toileting  - Record patient progress and toleration of activity level   Outcome: Progressing     Problem: DISCHARGE PLANNING  Goal: Discharge to home or other facility with appropriate resources  Description: INTERVENTIONS:  - Identify barriers to discharge w/patient and caregiver  - Arrange for needed discharge resources and transportation as appropriate  - Identify discharge learning needs (meds, wound care, etc )  - Arrange for interpretive services to assist at discharge as needed  - Refer to Case Management Department for coordinating discharge planning if the patient needs post-hospital services based on physician/advanced practitioner order or complex needs related to functional status, cognitive ability, or social support system  Outcome: Progressing     Problem: Knowledge Deficit  Goal: Patient/family/caregiver demonstrates understanding of disease process, treatment plan, medications, and discharge instructions  Description: Complete learning assessment and assess knowledge base    Interventions:  - Provide teaching at level of understanding  - Provide teaching via preferred learning methods  Outcome: Progressing     Problem: GASTROINTESTINAL - ADULT  Goal: Minimal or absence of nausea and/or vomiting  Description: INTERVENTIONS:  - Administer IV fluids if ordered to ensure adequate hydration  - Maintain NPO status until nausea and vomiting are resolved  - Nasogastric tube if ordered  - Administer ordered antiemetic medications as needed  - Provide nonpharmacologic comfort measures as appropriate  - Advance diet as tolerated, if ordered  - Consider nutrition services referral to assist patient with adequate nutrition and appropriate food choices  Outcome: Progressing

## 2022-08-26 NOTE — PROGRESS NOTES
Progress Note - Darrin Goodpasture 32 y o  male MRN: [id number]    Unit/Bed#: [address] [address] Encounter: [id number]        Assessment/Plan: This is a 75-year-old male who has a history of ulcerative colitis status post ileoanal anastomosis with J-pouch formation with subsequent anastomotic stricture requiring dilatation in the past   Patient hd flex-sig in May of this year which had shown some yellowish exudate the biopsies were consistent with chronic active enterocolitis with severe activity   This admission, flex sig was done yesterday which showed mild inflammation of the proximal pouch with several small focal ulcers, but otherwise normal endoscopic appearance  Biopsies obtainedy Patient is no longer on immunosuppression, was previously on [place] for ankylosing spondylitis   Infectious workup for diarrhea was negative   Continue antibiotics for treatment of pouchitis, no need for IV steroids  Leukocytosis now has normalized, patient reports that he was not taking antibiotics or steroids at home although he was discharged on 1 month of Flagyl in 6 weeks of steroids last month   Would recommend to continue on Flagyl indefinitely  Patient will need follow-up as outpatient with colorectal  Patient did have EUA 6/14 at [facility] showing long cuff and dilated pouch concerning for a twist   Patient had barium enema that day as well which had shown no significant abnormality and contrast reached ileum  Patient planning for pelvic MRI and possible revision of J-pouch  Should have MRE at that time  Diet has been advanced and patient is doing well and can be discharged from GI standpoint today  Subjective:   Patient is lying in bed  He reports some abdominal pain but much better  Tolerating diet without any nausea or vomiting      Objective:     Vitals: /74 (BP Location: Right arm)   Pulse 70   Temp 97 6 °F (36 4 °C) (Oral)   Resp 18   Ht 5' 9" (1 753 m)   Wt 82 1 kg (181 lb)   SpO2 96%   BMI 26 73 kg/m² Physical Exam:  Gen-alert no acute distress  Abd-positive bowel sounds nontender nondistended, no rebound rigidity guarding       Lab, Imaging and other studies:   Recent Results (from the past 72 hour(s))   Basic metabolic panel    Collection Time: 08/24/22  6:15 AM   Result Value Ref Range    Sodium 138 135 - 147 mmol/L    Potassium 3 7 3 5 - 5 3 mmol/L    Chloride 103 96 - 108 mmol/L    CO2 26 21 - 32 mmol/L    ANION GAP 9 4 - 13 mmol/L    BUN 11 5 - 25 mg/dL    Creatinine 1 09 0 60 - 1 30 mg/dL    Glucose 89 65 - 140 mg/dL    Calcium 8 1 (L) 8 3 - 10 1 mg/dL    eGFR 91 ml/min/1 73sq m   CBC    Collection Time: 08/24/22  6:15 AM   Result Value Ref Range    WBC 7 66 4 31 - 10 16 Thousand/uL    RBC 5 06 3 88 - 5 62 Million/uL    Hemoglobin 9 6 (L) 12 0 - 17 0 g/dL    Hematocrit 32 4 (L) 36 5 - 49 3 %    MCV 64 (L) 82 - 98 fL    MCH 19 0 (L) 26 8 - 34 3 pg    MCHC 29 6 (L) 31 4 - 37 4 g/dL    RDW 17 1 (H) 11 6 - 15 1 %    Platelets 238 057 - 178 Thousands/uL    MPV 8 6 (L) 8 9 - 12 7 fL   Basic metabolic panel    Collection Time: 08/26/22  5:20 AM   Result Value Ref Range    Sodium 141 135 - 147 mmol/L    Potassium 3 4 (L) 3 5 - 5 3 mmol/L    Chloride 105 96 - 108 mmol/L    CO2 29 21 - 32 mmol/L    ANION GAP 7 4 - 13 mmol/L    BUN 8 5 - 25 mg/dL    Creatinine 1 04 0 60 - 1 30 mg/dL    Glucose 120 65 - 140 mg/dL    Calcium 7 9 (L) 8 3 - 10 1 mg/dL    eGFR 96 ml/min/1 73sq m   CBC    Collection Time: 08/26/22  5:20 AM   Result Value Ref Range    WBC 7 67 4 31 - 10 16 Thousand/uL    RBC 5 31 3 88 - 5 62 Million/uL    Hemoglobin 10 1 (L) 12 0 - 17 0 g/dL    Hematocrit 34 1 (L) 36 5 - 49 3 %    MCV 64 (L) 82 - 98 fL    MCH 19 0 (L) 26 8 - 34 3 pg    MCHC 29 6 (L) 31 4 - 37 4 g/dL    RDW 17 0 (H) 11 6 - 15 1 %    Platelets 928 606 - 893 Thousands/uL    MPV 9 0 8 9 - 12 7 fL

## 2022-08-26 NOTE — PROGRESS NOTES
[facility] 43  Progress Note - Jocelyne Sofia 1995, 32 y o  male MRN: [id number]  Unit/Bed#: [address] [place] [address] Encounter: [id number]  Primary Care Provider: Jozef Ulrich DO   Date and time admitted to hospital: 8/21/2022 10:07 AM    * Intractable abdominal pain  Assessment & Plan  Patient presented with intractable abdominal pain  Patient with a history of ulcerative colitis status post total proctocolectomy with J-pouch ileoanal anastomosis, anastomotic stricture and persistent inflammation as well as questionable pouchitis and possible underlying Crohn's disease  · CT scan abdomen/pelvis showed moderate distension of ileal pouch with no obvious inflammation noted  · Continue IV Zosyn  · Solumedrol was discontinued by GI  · Was NPO, then clear liquids  Advanced to low-fiber/low residue  · Status post pouchoscopy today which showed multiple small focal ulcers along with mild inflammation of proximal pouch  · Appreciate GI input    Leukocytosis  Assessment & Plan  Patient with evidence of leukocytosis with a white blood cell count of 26 61 on admission   · Possibly reactive, possibly due to recent prolonged steroid taper from recent hospitalization  Patient also received Solu-Medrol  · Leukocytosis now within normal limits  · As noted above, continue on IV Zosyn therapy in addition to oral vancomycin  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[address] )  Assessment & Plan  Continue IV Zosyn  No indication for steroids per GI    Elevated C-reactive protein (CRP)  Assessment & Plan   7 on lab work on 08/22  History of Clostridium difficile infection  Assessment & Plan  Patient with prior history of C diff  · C diff testing negative  Continue with oral vancomycin for c diff prophylaxis while patient is on antibiotic therapy  Hyponatremia-resolved as of 8/24/2022  Assessment & Plan  · Patient with evidence of hyponatremia and hypochloremia likely secondary to diarrhea and volume depletion    Sodium level now normalized        VTE Pharmacologic Prophylaxis: VTE Score: 2 Low Risk (Score 0-2) - Encourage Ambulation  Patient Centered Rounds: I performed bedside rounds with nursing staff today  Discussions with Specialists or Other Care Team Provider: yes - GI    Education and Discussions with Family / Patient: yes    Time Spent for Care: 20 minutes  More than 50% of total time spent on counseling and coordination of care as described above  Current Length of Stay: 3 day(s)  Current Patient Status: Inpatient   Certification Statement: The patient will continue to require additional inpatient hospital stay due to Pouchitis requiring pouchoscopy and advancement of diet  Discharge Plan: Anticipate discharge tomorrow to home  Code Status: Level 1 - Full Code    Subjective:     Patient states he is starting to feel better  Objective:     Vitals:   Temp (24hrs), Av 8 °F (36 6 °C), Min:97 4 °F (36 3 °C), Max:98 1 °F (36 7 °C)    Temp:  [97 4 °F (36 3 °C)-98 1 °F (36 7 °C)] 98 1 °F (36 7 °C)  HR:  [64-91] 91  Resp:  [16-18] 18  BP: (116-151)/(79-89) 132/80  SpO2:  [93 %-100 %] 96 %  Body mass index is 27 02 kg/m²  Input and Output Summary (last 24 hours): Intake/Output Summary (Last 24 hours) at 2022  Last data filed at 2022 1717  Gross per 24 hour   Intake 2100 ml   Output --   Net 2100 ml       Physical Exam:   Physical Exam  Vitals reviewed  Constitutional:       General: He is not in acute distress  Appearance: He is not ill-appearing  HENT:      Head: Normocephalic and atraumatic  Eyes:      General:         Right eye: No discharge  Left eye: No discharge  Extraocular Movements: Extraocular movements intact  Cardiovascular:      Rate and Rhythm: Normal rate and regular rhythm  Pulmonary:      Effort: Pulmonary effort is normal  No respiratory distress  Breath sounds: Normal breath sounds  No wheezing or rales     Abdominal:      General: Bowel sounds are normal  There is no distension  Palpations: Abdomen is soft  Tenderness: There is abdominal tenderness (Mild, generalized)  There is no guarding  Musculoskeletal:      Right lower leg: No edema  Left lower leg: No edema  Neurological:      Mental Status: He is alert and oriented to person, place, and time  Additional Data:     Labs:  Results from last 7 days   Lab Units 08/24/22  0615 08/23/22  0533   WBC Thousand/uL 7 66 17 66*   HEMOGLOBIN g/dL 9 6* 10 4*   HEMATOCRIT % 32 4* 35 0*   PLATELETS Thousands/uL 306 323   NEUTROS PCT %  --  80*   LYMPHS PCT %  --  8*   MONOS PCT %  --  11   EOS PCT %  --  0     Results from last 7 days   Lab Units 08/24/22  0615 08/23/22  0533   SODIUM mmol/L 138 136   POTASSIUM mmol/L 3 7 4 1   CHLORIDE mmol/L 103 100   CO2 mmol/L 26 28   BUN mg/dL 11 12   CREATININE mg/dL 1 09 0 91   ANION GAP mmol/L 9 8   CALCIUM mg/dL 8 1* 8 4   ALBUMIN g/dL  --  3 0*   TOTAL BILIRUBIN mg/dL  --  0 55   ALK PHOS U/L  --  54   ALT U/L  --  18   AST U/L  --  8   GLUCOSE RANDOM mg/dL 89 102                 Results from last 7 days   Lab Units 08/21/22  1128   LACTIC ACID mmol/L 0 8       Lines/Drains:  Invasive Devices  Report    Peripheral Intravenous Line  Duration           Peripheral IV 08/25/22 Left;Ventral (anterior) Forearm <1 day                      Imaging: Reviewed radiology reports from this admission including: procedure reports    Recent Cultures (last 7 days):   Results from last 7 days   Lab Units 08/21/22 1959 08/21/22  1128   BLOOD CULTURE   --  No Growth After 4 Days  No Growth After 4 Days     C DIFF TOXIN B BY PCR  Negative  --        Last 24 Hours Medication List:   Current Facility-Administered Medications   Medication Dose Route Frequency Provider Last Rate    acetaminophen  650 mg Oral Q6H PRN Alan Woodson MD      DULoxetine  60 mg Oral Daily Alan Woodson MD      HYDROmorphone  0 5 mg Intravenous Q3H PRN Cheri Mata Linzie Opitz, MD      ondansetron  4 mg Intravenous Q6H PRN Fidel Grayson MD      pantoprazole  40 mg Intravenous Q12H [address] Fidel Grayson MD      piperacillin-tazobactam  3 375 g Intravenous Frankey Lex, MD 3 375 g (08/25/22 1846)    sodium chloride  75 mL/hr Intravenous Continuous Fiedl Grayson MD 75 mL/hr (08/25/22 1717)    vancomycin  125 mg Oral Q12H [address] [place]          Today, Patient Was Seen By: Iftikhar Boykin DO    **Please Note: This note may have been constructed using a voice recognition system  **

## 2022-08-26 NOTE — ASSESSMENT & PLAN NOTE
Discontinued IV Zosyn    Transitioned to Flagyl which he will need to continue indefinitely  No indication for steroids per GI

## 2022-08-26 NOTE — ASSESSMENT & PLAN NOTE
Patient with prior history of C diff  · C diff testing negative  Continue with oral vancomycin for c diff prophylaxis while patient is on antibiotic therapy

## 2022-08-26 NOTE — PROGRESS NOTES
[facility] 43  Progress Note - Felipa Lopez 1995, 32 y o  male MRN: [id number]  Unit/Bed#: [address] [place] [address] Encounter: [id number]  Primary Care Provider: Elly William DO   Date and time admitted to hospital: 8/21/2022 10:07 AM    * Intractable abdominal pain  Assessment & Plan  Patient presented with intractable abdominal pain  Patient with a history of ulcerative colitis status post total proctocolectomy with J-pouch ileoanal anastomosis, anastomotic stricture and persistent inflammation as well as questionable pouchitis and possible underlying Crohn's disease  · CT scan abdomen/pelvis showed moderate distension of ileal pouch with no obvious inflammation noted  · Patient reports diarrhea today likely due to Zosyn use  Discussed with GI and will discontinue Zosyn and vancomycin and start p o  Flagyl  · Solumedrol was discontinued by GI  · Was NPO, then clear liquids  Advanced to low-fiber/low residue which he is tolerating  · Status post pouchoscopy 8/25 which showed multiple small focal ulcers along with mild inflammation of proximal pouch  · Appreciate GI input    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[address] )  Assessment & Plan  Discontinued IV Zosyn  Transitioned to Flagyl which he will need to continue indefinitely  No indication for steroids per GI    Leukocytosis-resolved as of 8/26/2022  Assessment & Plan  Patient with evidence of leukocytosis with a white blood cell count of 26 61 on admission   · Possibly reactive, possibly due to recent prolonged steroid taper from recent hospitalization  Patient also received Solu-Medrol  · WBC count now within normal limits    Elevated C-reactive protein (CRP)  Assessment & Plan   7 on lab work on 08/22    History of Clostridium difficile infection  Assessment & Plan  Patient with prior history of C diff  · C diff testing negative    Discontinue oral vancomycin per GI which was started for c diff prophylaxis    Hyponatremia-resolved as of 2022  Assessment & Plan  · Patient with evidence of hyponatremia and hypochloremia likely secondary to diarrhea and volume depletion  Sodium level now normalized      VTE Pharmacologic Prophylaxis: VTE Score: 2 Low Risk (Score 0-2) - Encourage Ambulation  Patient Centered Rounds: I performed bedside rounds with nursing staff today  Discussions with Specialists or Other Care Team Provider: yes - GI    Education and Discussions with Family / Patient: yes    Time Spent for Care: 30 minutes  More than 50% of total time spent on counseling and coordination of care as described above  Current Length of Stay: 4 day(s)  Current Patient Status: Inpatient   Certification Statement: The patient will continue to require additional inpatient hospital stay due to Abdominal pain, diarrhea  Discharge Plan: Anticipate discharge tomorrow to home  Code Status: Level 1 - Full Code    Subjective:     Patient states he feels worse today  Reports diarrhea    Objective:     Vitals:   Temp (24hrs), Av 9 °F (36 6 °C), Min:97 6 °F (36 4 °C), Max:98 2 °F (36 8 °C)    Temp:  [97 6 °F (36 4 °C)-98 2 °F (36 8 °C)] 98 2 °F (36 8 °C)  HR:  [69-91] 69  Resp:  [18-20] 20  BP: (113-133)/(74-92) 133/92  SpO2:  [94 %-96 %] 96 %  Body mass index is 26 73 kg/m²  Input and Output Summary (last 24 hours): Intake/Output Summary (Last 24 hours) at 2022 1906  Last data filed at 2022 1010  Gross per 24 hour   Intake 520 ml   Output --   Net 520 ml       Physical Exam:   Physical Exam  Vitals reviewed  Constitutional:       General: He is not in acute distress  Appearance: He is not ill-appearing  HENT:      Head: Normocephalic and atraumatic  Eyes:      General:         Right eye: No discharge  Left eye: No discharge  Extraocular Movements: Extraocular movements intact  Cardiovascular:      Rate and Rhythm: Normal rate and regular rhythm     Pulmonary:      Effort: Pulmonary effort is normal  No respiratory distress  Breath sounds: Normal breath sounds  No wheezing or rales  Abdominal:      General: Bowel sounds are normal  There is no distension  Palpations: Abdomen is soft  Tenderness: There is abdominal tenderness (very mild generalized)  There is no guarding  Neurological:      Mental Status: He is alert and oriented to person, place, and time  Additional Data:     Labs:  Results from last 7 days   Lab Units 08/26/22 0520 08/24/22 0615 08/23/22  0533   WBC Thousand/uL 7 67   < > 17 66*   HEMOGLOBIN g/dL 10 1*   < > 10 4*   HEMATOCRIT % 34 1*   < > 35 0*   PLATELETS Thousands/uL 345   < > 323   NEUTROS PCT %  --   --  80*   LYMPHS PCT %  --   --  8*   MONOS PCT %  --   --  11   EOS PCT %  --   --  0    < > = values in this interval not displayed  Results from last 7 days   Lab Units 08/26/22 0520 08/24/22 0615 08/23/22  0533   SODIUM mmol/L 141   < > 136   POTASSIUM mmol/L 3 4*   < > 4 1   CHLORIDE mmol/L 105   < > 100   CO2 mmol/L 29   < > 28   BUN mg/dL 8   < > 12   CREATININE mg/dL 1 04   < > 0 91   ANION GAP mmol/L 7   < > 8   CALCIUM mg/dL 7 9*   < > 8 4   ALBUMIN g/dL  --   --  3 0*   TOTAL BILIRUBIN mg/dL  --   --  0 55   ALK PHOS U/L  --   --  54   ALT U/L  --   --  18   AST U/L  --   --  8   GLUCOSE RANDOM mg/dL 120   < > 102    < > = values in this interval not displayed  Results from last 7 days   Lab Units 08/21/22  1128   LACTIC ACID mmol/L 0 8       Lines/Drains:  Invasive Devices  Report    Peripheral Intravenous Line  Duration           Peripheral IV 08/25/22 Left;Ventral (anterior) Forearm <1 day                Imaging: No pertinent imaging reviewed  Recent Cultures (last 7 days):   Results from last 7 days   Lab Units 08/21/22 1959 08/21/22  1128   BLOOD CULTURE   --  No Growth After 5 Days  No Growth After 5 Days     C DIFF TOXIN B BY PCR  Negative  --        Last 24 Hours Medication List:   Current Facility-Administered Medications   Medication Dose Route Frequency Provider Last Rate    acetaminophen  650 mg Oral Q6H PRN Alan Woodson MD      DULoxetine  60 mg Oral Daily Alan Woodson MD      HYDROmorphone  0 5 mg Intravenous Q3H PRN Alan Woodson MD      metroNIDAZOLE  500 mg Oral [facility] Darlin Martinez DO      ondansetron  4 mg Intravenous Q6H PRN Alan Woodson MD      pantoprazole  40 mg Intravenous Q12H [address] Alan Woodson MD      saccharomyces boulardii  250 mg Oral BID Darlin Martinez DO      sodium chloride  75 mL/hr Intravenous Continuous Alan Woodson MD 75 mL/hr (08/26/22 0522)        Today, Patient Was Seen By: Aidee Gay DO    **Please Note: This note may have been constructed using a voice recognition system  **

## 2022-08-27 VITALS
BODY MASS INDEX: 26.66 KG/M2 | SYSTOLIC BLOOD PRESSURE: 127 MMHG | TEMPERATURE: 97.8 F | DIASTOLIC BLOOD PRESSURE: 82 MMHG | RESPIRATION RATE: 16 BRPM | HEIGHT: 69 IN | HEART RATE: 68 BPM | OXYGEN SATURATION: 96 % | WEIGHT: 180 LBS

## 2022-08-27 PROCEDURE — C9113 INJ PANTOPRAZOLE SODIUM, VIA: HCPCS | Performed by: FAMILY MEDICINE

## 2022-08-27 PROCEDURE — 99239 HOSP IP/OBS DSCHRG MGMT >30: CPT | Performed by: FAMILY MEDICINE

## 2022-08-27 RX ORDER — METRONIDAZOLE 500 MG/1
500 TABLET ORAL EVERY 8 HOURS SCHEDULED
Qty: 180 TABLET | Refills: 0 | Status: SHIPPED | OUTPATIENT
Start: 2022-08-27 | End: 2022-10-26

## 2022-08-27 RX ORDER — SACCHAROMYCES BOULARDII 250 MG
250 CAPSULE ORAL 2 TIMES DAILY
Qty: 30 CAPSULE | Refills: 0 | Status: SHIPPED | OUTPATIENT
Start: 2022-08-27

## 2022-08-27 RX ADMIN — DULOXETINE HYDROCHLORIDE 60 MG: 60 CAPSULE, DELAYED RELEASE ORAL at 09:46

## 2022-08-27 RX ADMIN — METRONIDAZOLE 500 MG: 500 TABLET ORAL at 05:23

## 2022-08-27 RX ADMIN — HYDROMORPHONE HYDROCHLORIDE 0.5 MG: 1 INJECTION, SOLUTION INTRAMUSCULAR; INTRAVENOUS; SUBCUTANEOUS at 09:46

## 2022-08-27 RX ADMIN — Medication 250 MG: at 09:46

## 2022-08-27 RX ADMIN — HYDROMORPHONE HYDROCHLORIDE 0.5 MG: 1 INJECTION, SOLUTION INTRAMUSCULAR; INTRAVENOUS; SUBCUTANEOUS at 06:40

## 2022-08-27 RX ADMIN — METRONIDAZOLE 500 MG: 500 TABLET ORAL at 14:01

## 2022-08-27 RX ADMIN — PANTOPRAZOLE SODIUM 40 MG: 40 INJECTION, POWDER, FOR SOLUTION INTRAVENOUS at 09:46

## 2022-08-27 RX ADMIN — HYDROMORPHONE HYDROCHLORIDE 0.5 MG: 1 INJECTION, SOLUTION INTRAMUSCULAR; INTRAVENOUS; SUBCUTANEOUS at 03:33

## 2022-08-27 RX ADMIN — HYDROMORPHONE HYDROCHLORIDE 0.5 MG: 1 INJECTION, SOLUTION INTRAMUSCULAR; INTRAVENOUS; SUBCUTANEOUS at 12:55

## 2022-08-27 NOTE — NURSING NOTE
Patient discharged home  IV removed and AVS reviewed with the patient, focusing on low fiber diet and antibiotic education  Patient verbalized understanding of teaching and readiness for discharge, and was sent with his belongings

## 2022-08-27 NOTE — DISCHARGE SUMMARY
[address]  Discharge- Jaxson Hubbard 1995, 32 y o  male MRN: [id number]  Unit/Bed#: [address] [place] [address] Encounter: [id number]  Primary Care Provider: Saurabh Leung DO   Date and time admitted to hospital: 8/21/2022 10:07 AM    * Intractable abdominal pain  Assessment & Plan  Patient presented with intractable abdominal pain  Patient with a history of ulcerative colitis status post total proctocolectomy with J-pouch ileoanal anastomosis, anastomotic stricture and persistent inflammation as well as questionable pouchitis and possible underlying Crohn's disease  · CT scan abdomen/pelvis showed moderate distension of ileal pouch with no obvious inflammation noted  · Patient reports diarrhea today likely due to Zosyn use  Discussed with GI and Zosyn and vancomycin was discontinued  · Started on p o  Flagyl which will be continued indefinitely till definitive plans are made with his primary surgeon  · Solumedrol was discontinued by GI  · Was NPO, then clear liquids  Advanced to low-fiber/low residue which he is tolerating  · Status post pouchoscopy 8/25 which showed multiple small focal ulcers along with mild inflammation of proximal pouch    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[facility] [address] )  Assessment & Plan  Discontinued IV Zosyn  Transitioned to Flagyl which he will need to continue indefinitely as noted above  No indication for steroids per GI    Leukocytosis-resolved as of 8/26/2022  Assessment & Plan  Patient with evidence of leukocytosis with a white blood cell count of 26 61 on admission   · Possibly reactive, possibly due to recent prolonged steroid taper from recent hospitalization  Patient also received Solu-Medrol  · WBC count now within normal limits    Elevated C-reactive protein (CRP)  Assessment & Plan   7 on lab work on 08/22  History of Clostridium difficile infection  Assessment & Plan  Patient with prior history of C diff  · C diff testing negative    Discontinued oral vancomycin per GI which was started for c diff prophylaxis    Hyponatremia-resolved as of 8/24/2022  Assessment & Plan  · Patient with evidence of hyponatremia and hypochloremia likely secondary to diarrhea and volume depletion  Sodium level now normalized    Medical Problems             Resolved Problems  Date Reviewed: 8/27/2022          Resolved    Leukocytosis 8/26/2022     Resolved by  Jethro Krishnan DO    Hyponatremia 8/24/2022     Resolved by  Jethro Krishnan DO    Hypokalemia 8/24/2022     Resolved by  Jethro Krishnan DO              Discharging Physician / Practitioner: Jethro Krishnan DO  PCP: Jozef Ulrich DO  Admission Date:   Admission Orders (From admission, onward)     Ordered        08/22/22 1551  Inpatient Admission  Once            08/21/22 1438  Place in Observation  Once                      Discharge Date: 08/27/22    Consultations During Hospital Stay:  · GI    Procedures Performed:   · Pouchoscopy    Significant Findings / Test Results:   · none    Incidental Findings:   · none    Test Results Pending at Discharge (will require follow up):   · none     Outpatient Tests Requested:  · none    Complications:  none    Reason for Admission: Abdominal pain    Hospital Course:   Jocelyne Sofia is a 32 y o  male patient who originally presented to the hospital on 8/21/2022 due to abdominal pain  CT scan showed moderate distension of ileal pouch but no obvious inflammation was noted  Patient was started on IV Zosyn and admitted and seen by GI  He was continued on IV antibiotics while here and underwent pouchoscopy  Diet was slowly advanced the patient tolerating solids by day of discharge  Cleared by GI prior to discharge  Discharge plan discussed with patient    Please see above list of diagnoses and related plan for additional information  Condition at Discharge: stable    Discharge Day Visit / Exam:   Subjective:  Feels better today compared to yesterday    Feels ready to go home    Vitals: Blood Pressure: 127/82 (08/27/22 0750)  Pulse: 68 (08/27/22 0750)  Temperature: 97 8 °F (36 6 °C) (08/27/22 0750)  Temp Source: Oral (08/27/22 0750)  Respirations: 16 (08/27/22 0750)  Height: 5' 9" (175 3 cm) (08/21/22 1540)  Weight - Scale: 81 6 kg (180 lb) (08/27/22 0531)  SpO2: 96 % (08/27/22 0750)  Exam:   Physical Exam  Vitals reviewed  Constitutional:       General: He is not in acute distress  Appearance: He is not ill-appearing  HENT:      Head: Normocephalic and atraumatic  Eyes:      Extraocular Movements: Extraocular movements intact  Cardiovascular:      Rate and Rhythm: Normal rate and regular rhythm  Pulmonary:      Effort: Pulmonary effort is normal  No respiratory distress  Breath sounds: Normal breath sounds  No wheezing or rales  Abdominal:      General: Bowel sounds are normal  There is no distension  Palpations: Abdomen is soft  Tenderness: There is abdominal tenderness (Mild generalized)  There is no guarding  Neurological:      Mental Status: He is alert and oriented to person, place, and time  Discharge instructions/Information to patient and family:   See after visit summary for information provided to patient and family  Provisions for Follow-Up Care:  See after visit summary for information related to follow-up care and any pertinent home health orders  Disposition:   Home    Planned Readmission: no     Discharge Statement:  I spent > 30 minutes discharging the patient  This time was spent on the day of discharge  I had direct contact with the patient on the day of discharge  Greater than 50% of the total time was spent examining patient, answering all patient questions, arranging and discussing plan of care with patient as well as directly providing post-discharge instructions  Additional time then spent on discharge activities      Discharge Medications:  See after visit summary for reconciled discharge medications provided to patient and/or family        **Please Note: This note may have been constructed using a voice recognition system**

## 2022-08-27 NOTE — DISCHARGE INSTRUCTIONS
Please get your pelvic MRI scheduled and follow-up with your colorectal surgeon after discharge as soon as possible

## 2022-08-27 NOTE — PLAN OF CARE
Problem: Potential for Falls  Goal: Patient will remain free of falls  Description: INTERVENTIONS:  - Educate patient/family on patient safety including physical limitations  - Instruct patient to call for assistance with activity   - Consult OT/PT to assist with strengthening/mobility   - Keep Call bell within reach  - Keep bed low and locked with side rails adjusted as appropriate  - Keep care items and personal belongings within reach  - Initiate and maintain comfort rounds  - Make Fall Risk Sign visible to staff  - Offer Toileting every  Hours, in advance of need  - Initiate/Maintain alarm  - Obtain necessary fall risk management equipment:   - Apply yellow socks and bracelet for high fall risk patients  - Consider moving patient to room near nurses station  Outcome: Progressing     Problem: PAIN - ADULT  Goal: Verbalizes/displays adequate comfort level or baseline comfort level  Description: Interventions:  - Encourage patient to monitor pain and request assistance  - Assess pain using appropriate pain scale  - Administer analgesics based on type and severity of pain and evaluate response  - Implement non-pharmacological measures as appropriate and evaluate response  - Consider cultural and social influences on pain and pain management  - Notify physician/advanced practitioner if interventions unsuccessful or patient reports new pain  Outcome: Progressing     Problem: INFECTION - ADULT  Goal: Absence or prevention of progression during hospitalization  Description: INTERVENTIONS:  - Assess and monitor for signs and symptoms of infection  - Monitor lab/diagnostic results  - Monitor all insertion sites, i e  indwelling lines, tubes, and drains  - Monitor endotracheal if appropriate and nasal secretions for changes in amount and color  - [place] appropriate cooling/warming therapies per order  - Administer medications as ordered  - Instruct and encourage patient and family to use good hand hygiene technique  - Identify and instruct in appropriate isolation precautions for identified infection/condition  Outcome: Progressing  Goal: Absence of fever/infection during neutropenic period  Description: INTERVENTIONS:  - Monitor WBC    Outcome: Progressing     Problem: SAFETY ADULT  Goal: Patient will remain free of falls  Description: INTERVENTIONS:  - Educate patient/family on patient safety including physical limitations  - Instruct patient to call for assistance with activity   - Consult OT/PT to assist with strengthening/mobility   - Keep Call bell within reach  - Keep bed low and locked with side rails adjusted as appropriate  - Keep care items and personal belongings within reach  - Initiate and maintain comfort rounds  - Make Fall Risk Sign visible to staff  - Offer Toileting every  Hours, in advance of need  - Initiate/Maintain alarm  - Obtain necessary fall risk management equipment:   - Apply yellow socks and bracelet for high fall risk patients  - Consider moving patient to room near nurses station  Outcome: Progressing  Goal: Maintain or return to baseline ADL function  Description: INTERVENTIONS:  -  Assess patient's ability to carry out ADLs; assess patient's baseline for ADL function and identify physical deficits which impact ability to perform ADLs (bathing, care of mouth/teeth, toileting, grooming, dressing, etc )  - Assess/evaluate cause of self-care deficits   - Assess range of motion  - Assess patient's mobility; develop plan if impaired  - Assess patient's need for assistive devices and provide as appropriate  - Encourage maximum independence but intervene and supervise when necessary  - Involve family in performance of ADLs  - Assess for home care needs following discharge   - Consider OT consult to assist with ADL evaluation and planning for discharge  - Provide patient education as appropriate  Outcome: Progressing  Goal: Maintains/Returns to pre admission functional level  Description: INTERVENTIONS:  - Perform BMAT or MOVE assessment daily    - Set and communicate daily mobility goal to care team and patient/family/caregiver  - Collaborate with rehabilitation services on mobility goals if consulted  - Perform Range of Motion  times a day  - Reposition patient every  hours  - Dangle patient  times a day  - Stand patient  times a day  - Ambulate patient  times a day  - Out of bed to chair  times a day   - Out of bed for meals  times a day  - Out of bed for toileting  - Record patient progress and toleration of activity level   Outcome: Progressing     Problem: DISCHARGE PLANNING  Goal: Discharge to home or other facility with appropriate resources  Description: INTERVENTIONS:  - Identify barriers to discharge w/patient and caregiver  - Arrange for needed discharge resources and transportation as appropriate  - Identify discharge learning needs (meds, wound care, etc )  - Arrange for interpretive services to assist at discharge as needed  - Refer to Case Management Department for coordinating discharge planning if the patient needs post-hospital services based on physician/advanced practitioner order or complex needs related to functional status, cognitive ability, or social support system  Outcome: Progressing     Problem: Knowledge Deficit  Goal: Patient/family/caregiver demonstrates understanding of disease process, treatment plan, medications, and discharge instructions  Description: Complete learning assessment and assess knowledge base    Interventions:  - Provide teaching at level of understanding  - Provide teaching via preferred learning methods  Outcome: Progressing     Problem: GASTROINTESTINAL - ADULT  Goal: Minimal or absence of nausea and/or vomiting  Description: INTERVENTIONS:  - Administer IV fluids if ordered to ensure adequate hydration  - Maintain NPO status until nausea and vomiting are resolved  - Nasogastric tube if ordered  - Administer ordered antiemetic medications as needed  - Provide nonpharmacologic comfort measures as appropriate  - Advance diet as tolerated, if ordered  - Consider nutrition services referral to assist patient with adequate nutrition and appropriate food choices  Outcome: Progressing

## 2022-08-29 ENCOUNTER — TRANSITIONAL CARE MANAGEMENT (OUTPATIENT)
Dept: FAMILY MEDICINE CLINIC | Facility: CLINIC | Age: 27
End: 2022-08-29

## 2022-08-30 PROCEDURE — 88305 TISSUE EXAM BY PATHOLOGIST: CPT | Performed by: PATHOLOGY

## 2022-09-02 ENCOUNTER — TELEPHONE (OUTPATIENT)
Dept: FAMILY MEDICINE CLINIC | Facility: CLINIC | Age: 27
End: 2022-09-02

## 2022-09-02 NOTE — ASSESSMENT & PLAN NOTE
Patient with prior history of C diff  · C diff testing negative    Discontinued oral vancomycin per GI which was started for c diff prophylaxis

## 2022-09-02 NOTE — RESULT ENCOUNTER NOTE
Please call the patient regarding his result  Biopsies of ileal pouch revealed chronic inflammation as expected  No other significant or worrisome findings    Follow-up in the next several weeks

## 2022-09-02 NOTE — ASSESSMENT & PLAN NOTE
Patient presented with intractable abdominal pain  Patient with a history of ulcerative colitis status post total proctocolectomy with J-pouch ileoanal anastomosis, anastomotic stricture and persistent inflammation as well as questionable pouchitis and possible underlying Crohn's disease  · CT scan abdomen/pelvis showed moderate distension of ileal pouch with no obvious inflammation noted  · Patient reports diarrhea today likely due to Zosyn use  Discussed with GI and Zosyn and vancomycin was discontinued  · Started on p o  Flagyl which will be continued indefinitely till definitive plans are made with his primary surgeon  · Solumedrol was discontinued by GI  · Was NPO, then clear liquids    Advanced to low-fiber/low residue which he is tolerating  · Status post pouchoscopy 8/25 which showed multiple small focal ulcers along with mild inflammation of proximal pouch

## 2022-09-02 NOTE — ASSESSMENT & PLAN NOTE
Discontinued IV Zosyn    Transitioned to Flagyl which he will need to continue indefinitely as noted above  No indication for steroids per GI

## 2022-09-02 NOTE — TELEPHONE ENCOUNTER
----- Message from Jethro Krishnan DO sent at 9/1/2022 11:55 PM EDT -----  Regarding: RE: Roby Jennings  Thank you for allowing us to participate in the care of your patient, Jocelyne Sofia, who was hospitalized from 8/21/2022 through 9/1/2022 with the admitting diagnosis of abdominal pain  Underwent pouchoscopy and was treated with IV Zosyn while here  Transitioned to Flagyl on discharge which per GI he should continue indefinitely      If you have any additional questions or would like to discuss further, please feel free to contact me      [facility], Hospitalist  [phone]

## 2022-09-19 ENCOUNTER — APPOINTMENT (EMERGENCY)
Dept: RADIOLOGY | Facility: HOSPITAL | Age: 27
End: 2022-09-19
Payer: COMMERCIAL

## 2022-09-19 ENCOUNTER — TELEPHONE (OUTPATIENT)
Dept: FAMILY MEDICINE CLINIC | Facility: CLINIC | Age: 27
End: 2022-09-19

## 2022-09-19 ENCOUNTER — HOSPITAL ENCOUNTER (EMERGENCY)
Facility: HOSPITAL | Age: 27
Discharge: HOME | End: 2022-09-19
Attending: EMERGENCY MEDICINE
Payer: COMMERCIAL

## 2022-09-19 VITALS
OXYGEN SATURATION: 95 % | HEART RATE: 82 BPM | WEIGHT: 182.98 LBS | RESPIRATION RATE: 18 BRPM | TEMPERATURE: 97.8 F | BODY MASS INDEX: 27.02 KG/M2 | SYSTOLIC BLOOD PRESSURE: 125 MMHG | DIASTOLIC BLOOD PRESSURE: 80 MMHG

## 2022-09-19 DIAGNOSIS — R10.9 ACUTE ABDOMINAL PAIN: ICD-10-CM

## 2022-09-19 DIAGNOSIS — K91.850 ILEAL POUCHITIS (HCC): Primary | ICD-10-CM

## 2022-09-19 DIAGNOSIS — R10.9 CHRONIC ABDOMINAL PAIN: Primary | ICD-10-CM

## 2022-09-19 DIAGNOSIS — G89.29 CHRONIC ABDOMINAL PAIN: Primary | ICD-10-CM

## 2022-09-19 LAB
ALBUMIN SERPL BCP-MCNC: 3.8 G/DL (ref 3.5–5)
ALP SERPL-CCNC: 96 U/L (ref 46–116)
ALT SERPL W P-5'-P-CCNC: 35 U/L (ref 12–78)
ANION GAP SERPL CALCULATED.3IONS-SCNC: 9 MMOL/L (ref 4–13)
AST SERPL W P-5'-P-CCNC: 19 U/L (ref 5–45)
BASOPHILS # BLD AUTO: 0.1 THOUSANDS/ΜL (ref 0–0.1)
BASOPHILS NFR BLD AUTO: 1 % (ref 0–1)
BILIRUB SERPL-MCNC: 0.63 MG/DL (ref 0.2–1)
BUN SERPL-MCNC: 13 MG/DL (ref 5–25)
CALCIUM SERPL-MCNC: 8.9 MG/DL (ref 8.3–10.1)
CHLORIDE SERPL-SCNC: 99 MMOL/L (ref 96–108)
CO2 SERPL-SCNC: 28 MMOL/L (ref 21–32)
CREAT SERPL-MCNC: 1.09 MG/DL (ref 0.6–1.3)
EOSINOPHIL # BLD AUTO: 0.21 THOUSAND/ΜL (ref 0–0.61)
EOSINOPHIL NFR BLD AUTO: 2 % (ref 0–6)
ERYTHROCYTE [DISTWIDTH] IN BLOOD BY AUTOMATED COUNT: 18.1 % (ref 11.6–15.1)
GFR SERPL CREATININE-BSD FRML MDRD: 91 ML/MIN/1.73SQ M
GLUCOSE SERPL-MCNC: 93 MG/DL (ref 65–140)
HCT VFR BLD AUTO: 39 % (ref 36.5–49.3)
HGB BLD-MCNC: 11.6 G/DL (ref 12–17)
IMM GRANULOCYTES # BLD AUTO: 0.04 THOUSAND/UL (ref 0–0.2)
IMM GRANULOCYTES NFR BLD AUTO: 0 % (ref 0–2)
LIPASE SERPL-CCNC: 187 U/L (ref 73–393)
LYMPHOCYTES # BLD AUTO: 1.6 THOUSANDS/ΜL (ref 0.6–4.47)
LYMPHOCYTES NFR BLD AUTO: 16 % (ref 14–44)
MCH RBC QN AUTO: 19 PG (ref 26.8–34.3)
MCHC RBC AUTO-ENTMCNC: 29.7 G/DL (ref 31.4–37.4)
MCV RBC AUTO: 64 FL (ref 82–98)
MONOCYTES # BLD AUTO: 1.27 THOUSAND/ΜL (ref 0.17–1.22)
MONOCYTES NFR BLD AUTO: 13 % (ref 4–12)
NEUTROPHILS # BLD AUTO: 6.76 THOUSANDS/ΜL (ref 1.85–7.62)
NEUTS SEG NFR BLD AUTO: 68 % (ref 43–75)
NRBC BLD AUTO-RTO: 0 /100 WBCS
PLATELET # BLD AUTO: 408 THOUSANDS/UL (ref 149–390)
PMV BLD AUTO: 8.9 FL (ref 8.9–12.7)
POTASSIUM SERPL-SCNC: 3.7 MMOL/L (ref 3.5–5.3)
PROT SERPL-MCNC: 7.9 G/DL (ref 6.4–8.4)
RBC # BLD AUTO: 6.11 MILLION/UL (ref 3.88–5.62)
SODIUM SERPL-SCNC: 136 MMOL/L (ref 135–147)
WBC # BLD AUTO: 9.98 THOUSAND/UL (ref 4.31–10.16)

## 2022-09-19 PROCEDURE — 36415 COLL VENOUS BLD VENIPUNCTURE: CPT | Performed by: PHYSICIAN ASSISTANT

## 2022-09-19 PROCEDURE — 96375 TX/PRO/DX INJ NEW DRUG ADDON: CPT

## 2022-09-19 PROCEDURE — 96374 THER/PROPH/DIAG INJ IV PUSH: CPT

## 2022-09-19 PROCEDURE — 80053 COMPREHEN METABOLIC PANEL: CPT | Performed by: PHYSICIAN ASSISTANT

## 2022-09-19 PROCEDURE — 83690 ASSAY OF LIPASE: CPT | Performed by: PHYSICIAN ASSISTANT

## 2022-09-19 PROCEDURE — 96361 HYDRATE IV INFUSION ADD-ON: CPT

## 2022-09-19 PROCEDURE — 74177 CT ABD & PELVIS W/CONTRAST: CPT

## 2022-09-19 PROCEDURE — G1004 CDSM NDSC: HCPCS

## 2022-09-19 PROCEDURE — 96376 TX/PRO/DX INJ SAME DRUG ADON: CPT

## 2022-09-19 PROCEDURE — 99284 EMERGENCY DEPT VISIT MOD MDM: CPT

## 2022-09-19 PROCEDURE — 85025 COMPLETE CBC W/AUTO DIFF WBC: CPT | Performed by: PHYSICIAN ASSISTANT

## 2022-09-19 PROCEDURE — 99285 EMERGENCY DEPT VISIT HI MDM: CPT | Performed by: PHYSICIAN ASSISTANT

## 2022-09-19 RX ORDER — HYDROMORPHONE HCL/PF 1 MG/ML
1 SYRINGE (ML) INJECTION ONCE
Status: COMPLETED | OUTPATIENT
Start: 2022-09-19 | End: 2022-09-19

## 2022-09-19 RX ORDER — ONDANSETRON 2 MG/ML
4 INJECTION INTRAMUSCULAR; INTRAVENOUS ONCE
Status: COMPLETED | OUTPATIENT
Start: 2022-09-19 | End: 2022-09-19

## 2022-09-19 RX ORDER — OXYCODONE HYDROCHLORIDE AND ACETAMINOPHEN 5; 325 MG/1; MG/1
1 TABLET ORAL EVERY 6 HOURS PRN
Qty: 10 TABLET | Refills: 0 | Status: SHIPPED | OUTPATIENT
Start: 2022-09-19 | End: 2022-10-07

## 2022-09-19 RX ADMIN — ONDANSETRON 4 MG: 2 INJECTION INTRAMUSCULAR; INTRAVENOUS at 08:32

## 2022-09-19 RX ADMIN — SODIUM CHLORIDE 1000 ML: 0.9 INJECTION, SOLUTION INTRAVENOUS at 08:31

## 2022-09-19 RX ADMIN — IOHEXOL 85 ML: 350 INJECTION, SOLUTION INTRAVENOUS at 10:15

## 2022-09-19 RX ADMIN — HYDROMORPHONE HYDROCHLORIDE 1 MG: 1 INJECTION, SOLUTION INTRAMUSCULAR; INTRAVENOUS; SUBCUTANEOUS at 08:32

## 2022-09-19 RX ADMIN — HYDROMORPHONE HYDROCHLORIDE 1 MG: 1 INJECTION, SOLUTION INTRAMUSCULAR; INTRAVENOUS; SUBCUTANEOUS at 10:58

## 2022-09-19 NOTE — TELEPHONE ENCOUNTER
DR Jorge Alberto Mohr     Patient went to er for an infection  He said he is still in pain  He is asking if you could give him medication for pain, just to get him through the night  Please advise

## 2022-09-19 NOTE — ED PROVIDER NOTES
History  Chief Complaint   Patient presents with    Abdominal Pain     Patient c/o abdominal pain that started at 0200 and states he is now vomiting and unable to hold fluids down      33 y/o male, h/o UC s/p total proctocolectomy with J-pouch ileoanal anastomosis, anastomotic stricture and recently pouchitis for which he underwent pouchoscopy on 8/25 with recent admission  Presenting today with acute on chronic abdominal pain that started at 2:00 a m  This morning waking him from sleep  States that he typically has diarrhea and blood in the stool which is not worsened, last bowel movement was a few hours ago  Patient is concern for obstruction  States that he has had nausea and vomiting started while he was at work had to leave  Has otherwise had a normal week  It is epigastric burning discomfort as well as lower suprapubic pressure pain  On Flagyl  Denies cough congestion, fevers, flank pain, changes in urination  Prior to Admission Medications   Prescriptions Last Dose Informant Patient Reported? Taking?    DULoxetine (CYMBALTA) 60 mg delayed release capsule   No No   Sig: Take 1 capsule (60 mg total) by mouth daily   Florastor 250 MG capsule   Yes No   SULFASALAZINE PO   Yes No   Sig: Take 500 mg by mouth   metroNIDAZOLE (FLAGYL) 500 mg tablet   No No   Sig: Take 1 tablet (500 mg total) by mouth every 8 (eight) hours   pantoprazole (PROTONIX) 20 mg tablet   No No   Sig: Take 1 tablet (20 mg total) by mouth daily   Patient not taking: No sig reported   saccharomyces boulardii (FLORASTOR) 250 mg capsule   No No   Sig: Take 1 capsule (250 mg total) by mouth 2 (two) times a day      Facility-Administered Medications: None       Past Medical History:   Diagnosis Date    [facility]     Anxiety     Bowel obstruction (HCC)     Clostridium difficile colitis 9/13/2018    Colitis     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[facility] ) 9/13/2018    Pancreatitis     Ulcerative colitis ([facility] )        Past Surgical History:   Procedure Laterality Date    COLECTOMY TOTAL      with ileal pouch and anastemosis    IR PICC PLACEMENT SINGLE LUMEN  3/1/2022    TOTAL COLECTOMY         History reviewed  No pertinent family history  I have reviewed and agree with the history as documented  E-Cigarette/Vaping    E-Cigarette Use Never User      E-Cigarette/Vaping Substances    Nicotine No     THC No     CBD No     Flavoring No     Other No     Unknown No      Social History     Tobacco Use    Smoking status: Never Smoker    Smokeless tobacco: Never Used   Vaping Use    Vaping Use: Never used   Substance Use Topics    Alcohol use: Not Currently     Comment: pt states 5 a month/socially    Drug use: No       Review of Systems   Constitutional: Negative  Negative for chills, fatigue and fever  HENT: Negative  Negative for congestion, postnasal drip, rhinorrhea and sore throat  Eyes: Negative  Respiratory: Negative  Negative for cough, shortness of breath and wheezing  Cardiovascular: Negative  Gastrointestinal: Positive for abdominal pain, blood in stool, diarrhea, nausea and vomiting  Negative for abdominal distention, anal bleeding, constipation and rectal pain  Endocrine: Negative  Genitourinary: Negative  Musculoskeletal: Negative  Skin: Negative  Neurological: Negative  Hematological: Negative  Psychiatric/Behavioral: Negative  All other systems reviewed and are negative  Physical Exam  Physical Exam  Vitals and nursing note reviewed  Constitutional:       General: He is not in acute distress  Appearance: He is well-developed  He is not diaphoretic  HENT:      Head: Normocephalic and atraumatic  Right Ear: External ear normal       Left Ear: External ear normal       Nose: Nose normal       Mouth/Throat:      Pharynx: No oropharyngeal exudate  Eyes:      General: No scleral icterus  Right eye: No discharge  Left eye: No discharge  Conjunctiva/sclera: Conjunctivae normal       Pupils: Pupils are equal, round, and reactive to light  Cardiovascular:      Rate and Rhythm: Normal rate and regular rhythm  Pulses: Normal pulses  Heart sounds: Normal heart sounds  No murmur heard  No friction rub  No gallop  Pulmonary:      Effort: Pulmonary effort is normal  No respiratory distress  Breath sounds: Normal breath sounds  No stridor  No wheezing, rhonchi or rales  Comments: spo2 is 98% indicating adequate oxygenation   Chest:      Chest wall: No tenderness  Abdominal:      General: Bowel sounds are normal  There is no distension  Palpations: Abdomen is soft  There is no mass  Tenderness: There is abdominal tenderness  There is guarding  There is no right CVA tenderness, left CVA tenderness or rebound  Hernia: No hernia is present  Musculoskeletal:      Cervical back: Normal range of motion and neck supple  Lymphadenopathy:      Cervical: No cervical adenopathy  Skin:     General: Skin is warm and dry  Capillary Refill: Capillary refill takes less than 2 seconds  Coloration: Skin is not pale  Findings: No erythema or rash  Neurological:      General: No focal deficit present  Mental Status: He is alert and oriented to person, place, and time  Mental status is at baseline           Vital Signs  ED Triage Vitals   Temperature Pulse Respirations Blood Pressure SpO2   09/19/22 0808 09/19/22 0808 09/19/22 0808 09/19/22 0808 09/19/22 0808   (!) 96 8 °F (36 °C) 92 19 169/92 98 %      Temp Source Heart Rate Source Patient Position - Orthostatic VS BP Location FiO2 (%)   09/19/22 0808 09/19/22 0808 09/19/22 0808 09/19/22 1016 --   Tympanic Monitor Sitting Right arm       Pain Score       09/19/22 0808       9           Vitals:    09/19/22 0808 09/19/22 1016   BP: 169/92 133/68   Pulse: 92 88   Patient Position - Orthostatic VS: Sitting Sitting         Visual Acuity      ED Medications  Medications   sodium chloride 0 9 % bolus 1,000 mL (0 mL Intravenous Stopped 9/19/22 1059)   ondansetron (ZOFRAN) injection 4 mg (4 mg Intravenous Given 9/19/22 0832)   HYDROmorphone (DILAUDID) injection 1 mg (1 mg Intravenous Given 9/19/22 0832)   barium (READI-CAT 2) suspension 900 mL (900 mL Oral Given 9/19/22 0900)   iohexol (OMNIPAQUE) 350 MG/ML injection (SINGLE-DOSE) 85 mL (85 mL Intravenous Given 9/19/22 1015)   HYDROmorphone (DILAUDID) injection 1 mg (1 mg Intravenous Given 9/19/22 1058)       Diagnostic Studies  Results Reviewed     Procedure Component Value Units Date/Time    Lipase [[id number]]  (Normal) Collected: 09/19/22 0836    Lab Status: Final result Specimen: Blood from Arm, Right Updated: 09/19/22 0920     Lipase 187 u/L     Comprehensive metabolic panel [[id number]] Collected: 09/19/22 0836    Lab Status: Final result Specimen: Blood from Arm, Right Updated: 09/19/22 0920     Sodium 136 mmol/L      Potassium 3 7 mmol/L      Chloride 99 mmol/L      CO2 28 mmol/L      ANION GAP 9 mmol/L      BUN 13 mg/dL      Creatinine 1 09 mg/dL      Glucose 93 mg/dL      Calcium 8 9 mg/dL      AST 19 U/L      ALT 35 U/L      Alkaline Phosphatase 96 U/L      Total Protein 7 9 g/dL      Albumin 3 8 g/dL      Total Bilirubin 0 63 mg/dL      eGFR 91 ml/min/1 73sq m     Narrative:      [place] guidelines for Chronic Kidney Disease (CKD):     Stage 1 with normal or high GFR (GFR > 90 mL/min/1 73 square meters)    Stage 2 Mild CKD (GFR = 60-89 mL/min/1 73 square meters)    Stage 3A Moderate CKD (GFR = 45-59 mL/min/1 73 square meters)    Stage 3B Moderate CKD (GFR = 30-44 mL/min/1 73 square meters)    Stage 4 Severe CKD (GFR = 15-29 mL/min/1 73 square meters)    Stage 5 End Stage CKD (GFR <15 mL/min/1 73 square meters)  Note: GFR calculation is accurate only with a steady state creatinine    CBC and differential [[id number]]  (Abnormal) Collected: 09/19/22 0836    Lab Status: Final result Specimen: Blood from Arm, Right Updated: 09/19/22 0848     WBC 9 98 Thousand/uL      RBC 6 11 Million/uL      Hemoglobin 11 6 g/dL      Hematocrit 39 0 %      MCV 64 fL      MCH 19 0 pg      MCHC 29 7 g/dL      RDW 18 1 %      MPV 8 9 fL      Platelets 975 Thousands/uL      nRBC 0 /100 WBCs      Neutrophils Relative 68 %      Immat GRANS % 0 %      Lymphocytes Relative 16 %      Monocytes Relative 13 %      Eosinophils Relative 2 %      Basophils Relative 1 %      Neutrophils Absolute 6 76 Thousands/µL      Immature Grans Absolute 0 04 Thousand/uL      Lymphocytes Absolute 1 60 Thousands/µL      Monocytes Absolute 1 27 Thousand/µL      Eosinophils Absolute 0 21 Thousand/µL      Basophils Absolute 0 10 Thousands/µL                  CT abdomen pelvis with contrast   Final Result by Colby Fitch MD (09/19 1106)      Fluid in the ileal pouch and minimal thickening of distal ileum may represent nonspecific distal ileal-pouchitis in the appropriate clinical context  No perienteric inflammatory changes or abscess  Interval enlargement of few mesenteric lymph nodes and mild new splenomegaly  CT abdomen and pelvis follow-up in 3 months is advised in addition to clinical evaluation  No other significant interval change  This study demonstrates a significant  finding and was documented as such in [facility] for liaison and referring practitioner notification  Workstation performed: [id number]                    Procedures  Procedures         ED Course  ED Course as of 09/19/22 1138   Mon Sep 19, 2022   9903 Patient believes he can drink contrast  Discussed with CT who is changing the order      1123 Patient re-evaluated, is waiting to hear back from Gastroenterology regarding recommendations at this point in time                               SBIRT 22yo+    [address] Most Recent Value   SBIRT (23 yo +)    In order to provide better care to our patients, we are screening all of our patients for alcohol and drug use  Would it be okay to ask you these screening questions? No Filed at: 09/19/2022 0589                    [facility]  Number of Diagnoses or Management Options  Chronic abdominal pain  Diagnosis management comments: Discussed case with [facility] of GI relays that patient can go home, no indication for admission at this point time, he needs to follow up with [facility] an outpatient  Patient appears comfortable no distress  He is informed to continue his Flagyl  Patient is informed to return to the emergency department for worsening of symptoms and was given proper education regarding their diagnosis and symptoms  Otherwise the patient is informed to follow up with their primary care doctor for re-evaluation  The patient verbalizes understanding and agrees with above assessment and plan  All questions were answered  Please Note: Fluency Direct voice recognition software may have been used in the creation of this document  Wrong words or sound a like substitutions may have occurred due to the inherent limitations of the voice software  Amount and/or Complexity of Data Reviewed  Clinical lab tests: ordered and reviewed  Tests in the radiology section of CPT®: reviewed and ordered  Review and summarize past medical records: yes  Discuss the patient with other providers: yes  Independent visualization of images, tracings, or specimens: yes        Disposition  Final diagnoses:   Chronic abdominal pain     Time reflects when diagnosis was documented in both MDM as applicable and the Disposition within this note     Time User Action Codes Description Comment    9/19/2022 11:37 AM Shira Chow Add [R10 9,  G89 29] Chronic abdominal pain       ED Disposition     ED Disposition   Discharge    Condition   Stable    Date/Time   Mon Sep 19, 2022 11:37 AM    Comment   Euna Goodpasture discharge to home/self care                 Follow-up Information     Follow up With Specialties Details Why Contact Info Additional Information    [address] [facility] Emergency Department Emergency Medicine Go to  If symptoms worsen, otherwise please follow up with your family doctor/GI/ [address] [address] [address] [address] Emergency Department, [place] [address] [place], [address], [place] Gastroenterology Schedule an appointment as soon as possible for a visit today  [facility]  [phone]             Patient's Medications   Discharge Prescriptions    No medications on file       No discharge procedures on file      PDMP Review       Value Time User    PDMP Reviewed  Yes 8/27/2022 12:28 PM Florin Joseph DO          ED Provider  Electronically Signed by           Stew Whittaker PA-C  09/19/22 1131

## 2022-09-20 ENCOUNTER — TELEPHONE (OUTPATIENT)
Dept: OTHER | Facility: OTHER | Age: 27
End: 2022-09-20

## 2022-09-20 ENCOUNTER — HOSPITAL ENCOUNTER (INPATIENT)
Facility: HOSPITAL | Age: 27
LOS: 5 days | Discharge: HOME | DRG: 386 | End: 2022-09-26
Attending: EMERGENCY MEDICINE | Admitting: FAMILY MEDICINE
Payer: COMMERCIAL

## 2022-09-20 ENCOUNTER — APPOINTMENT (OUTPATIENT)
Dept: RADIOLOGY | Facility: HOSPITAL | Age: 27
DRG: 386 | End: 2022-09-20
Payer: COMMERCIAL

## 2022-09-20 DIAGNOSIS — R10.9 INTRACTABLE ABDOMINAL PAIN: ICD-10-CM

## 2022-09-20 DIAGNOSIS — K91.850 ILEAL POUCHITIS (HCC): ICD-10-CM

## 2022-09-20 DIAGNOSIS — R10.9 ABDOMINAL PAIN: Primary | ICD-10-CM

## 2022-09-20 LAB
ALBUMIN SERPL BCP-MCNC: 3.6 G/DL (ref 3.5–5)
ALP SERPL-CCNC: 91 U/L (ref 46–116)
ALT SERPL W P-5'-P-CCNC: 36 U/L (ref 12–78)
ANION GAP SERPL CALCULATED.3IONS-SCNC: 7 MMOL/L (ref 4–13)
AST SERPL W P-5'-P-CCNC: 22 U/L (ref 5–45)
BASOPHILS # BLD AUTO: 0.1 THOUSANDS/ΜL (ref 0–0.1)
BASOPHILS NFR BLD AUTO: 1 % (ref 0–1)
BILIRUB SERPL-MCNC: 0.7 MG/DL (ref 0.2–1)
BUN SERPL-MCNC: 10 MG/DL (ref 5–25)
CALCIUM SERPL-MCNC: 8.8 MG/DL (ref 8.3–10.1)
CHLORIDE SERPL-SCNC: 102 MMOL/L (ref 96–108)
CO2 SERPL-SCNC: 29 MMOL/L (ref 21–32)
CREAT SERPL-MCNC: 1.21 MG/DL (ref 0.6–1.3)
EOSINOPHIL # BLD AUTO: 0.15 THOUSAND/ΜL (ref 0–0.61)
EOSINOPHIL NFR BLD AUTO: 2 % (ref 0–6)
ERYTHROCYTE [DISTWIDTH] IN BLOOD BY AUTOMATED COUNT: 18.1 % (ref 11.6–15.1)
GFR SERPL CREATININE-BSD FRML MDRD: 80 ML/MIN/1.73SQ M
GLUCOSE SERPL-MCNC: 106 MG/DL (ref 65–140)
HCT VFR BLD AUTO: 36.6 % (ref 36.5–49.3)
HGB BLD-MCNC: 10.8 G/DL (ref 12–17)
IMM GRANULOCYTES # BLD AUTO: 0.04 THOUSAND/UL (ref 0–0.2)
IMM GRANULOCYTES NFR BLD AUTO: 0 % (ref 0–2)
LIPASE SERPL-CCNC: 68 U/L (ref 73–393)
LYMPHOCYTES # BLD AUTO: 1.85 THOUSANDS/ΜL (ref 0.6–4.47)
LYMPHOCYTES NFR BLD AUTO: 20 % (ref 14–44)
MCH RBC QN AUTO: 18.9 PG (ref 26.8–34.3)
MCHC RBC AUTO-ENTMCNC: 29.5 G/DL (ref 31.4–37.4)
MCV RBC AUTO: 64 FL (ref 82–98)
MONOCYTES # BLD AUTO: 1.11 THOUSAND/ΜL (ref 0.17–1.22)
MONOCYTES NFR BLD AUTO: 12 % (ref 4–12)
NEUTROPHILS # BLD AUTO: 6.21 THOUSANDS/ΜL (ref 1.85–7.62)
NEUTS SEG NFR BLD AUTO: 65 % (ref 43–75)
NRBC BLD AUTO-RTO: 0 /100 WBCS
PLATELET # BLD AUTO: 364 THOUSANDS/UL (ref 149–390)
PMV BLD AUTO: 8.6 FL (ref 8.9–12.7)
POTASSIUM SERPL-SCNC: 3.4 MMOL/L (ref 3.5–5.3)
PROT SERPL-MCNC: 7.5 G/DL (ref 6.4–8.4)
RBC # BLD AUTO: 5.72 MILLION/UL (ref 3.88–5.62)
SODIUM SERPL-SCNC: 138 MMOL/L (ref 135–147)
WBC # BLD AUTO: 9.46 THOUSAND/UL (ref 4.31–10.16)

## 2022-09-20 PROCEDURE — 80053 COMPREHEN METABOLIC PANEL: CPT | Performed by: EMERGENCY MEDICINE

## 2022-09-20 PROCEDURE — 96361 HYDRATE IV INFUSION ADD-ON: CPT

## 2022-09-20 PROCEDURE — 99285 EMERGENCY DEPT VISIT HI MDM: CPT | Performed by: EMERGENCY MEDICINE

## 2022-09-20 PROCEDURE — 96375 TX/PRO/DX INJ NEW DRUG ADDON: CPT

## 2022-09-20 PROCEDURE — 96374 THER/PROPH/DIAG INJ IV PUSH: CPT

## 2022-09-20 PROCEDURE — 74022 RADEX COMPL AQT ABD SERIES: CPT

## 2022-09-20 PROCEDURE — 83690 ASSAY OF LIPASE: CPT | Performed by: EMERGENCY MEDICINE

## 2022-09-20 PROCEDURE — 96376 TX/PRO/DX INJ SAME DRUG ADON: CPT

## 2022-09-20 PROCEDURE — 99285 EMERGENCY DEPT VISIT HI MDM: CPT

## 2022-09-20 PROCEDURE — C9113 INJ PANTOPRAZOLE SODIUM, VIA: HCPCS | Performed by: EMERGENCY MEDICINE

## 2022-09-20 PROCEDURE — 85025 COMPLETE CBC W/AUTO DIFF WBC: CPT | Performed by: EMERGENCY MEDICINE

## 2022-09-20 PROCEDURE — 36415 COLL VENOUS BLD VENIPUNCTURE: CPT | Performed by: EMERGENCY MEDICINE

## 2022-09-20 RX ORDER — ENOXAPARIN SODIUM 100 MG/ML
40 INJECTION SUBCUTANEOUS DAILY
Status: DISCONTINUED | OUTPATIENT
Start: 2022-09-21 | End: 2022-09-26 | Stop reason: HOSPADM

## 2022-09-20 RX ORDER — SODIUM CHLORIDE 9 MG/ML
125 INJECTION, SOLUTION INTRAVENOUS CONTINUOUS
Status: DISCONTINUED | OUTPATIENT
Start: 2022-09-20 | End: 2022-09-26 | Stop reason: HOSPADM

## 2022-09-20 RX ORDER — HYDROMORPHONE HCL/PF 1 MG/ML
0.5 SYRINGE (ML) INJECTION
Status: DISCONTINUED | OUTPATIENT
Start: 2022-09-20 | End: 2022-09-26 | Stop reason: HOSPADM

## 2022-09-20 RX ORDER — HYDROMORPHONE HCL/PF 1 MG/ML
1 SYRINGE (ML) INJECTION ONCE
Status: COMPLETED | OUTPATIENT
Start: 2022-09-20 | End: 2022-09-20

## 2022-09-20 RX ORDER — PANTOPRAZOLE SODIUM 40 MG/10ML
40 INJECTION, POWDER, LYOPHILIZED, FOR SOLUTION INTRAVENOUS EVERY 12 HOURS SCHEDULED
Status: DISCONTINUED | OUTPATIENT
Start: 2022-09-21 | End: 2022-09-26 | Stop reason: HOSPADM

## 2022-09-20 RX ORDER — METRONIDAZOLE 500 MG/1
500 TABLET ORAL EVERY 8 HOURS SCHEDULED
Status: DISCONTINUED | OUTPATIENT
Start: 2022-09-20 | End: 2022-09-26 | Stop reason: HOSPADM

## 2022-09-20 RX ORDER — ACETAMINOPHEN 325 MG/1
650 TABLET ORAL EVERY 6 HOURS PRN
Status: DISCONTINUED | OUTPATIENT
Start: 2022-09-20 | End: 2022-09-26 | Stop reason: HOSPADM

## 2022-09-20 RX ORDER — POTASSIUM CHLORIDE 14.9 MG/ML
20 INJECTION INTRAVENOUS ONCE
Status: COMPLETED | OUTPATIENT
Start: 2022-09-20 | End: 2022-09-21

## 2022-09-20 RX ORDER — DULOXETIN HYDROCHLORIDE 60 MG/1
60 CAPSULE, DELAYED RELEASE ORAL DAILY
Status: DISCONTINUED | OUTPATIENT
Start: 2022-09-21 | End: 2022-09-26 | Stop reason: HOSPADM

## 2022-09-20 RX ORDER — SACCHAROMYCES BOULARDII 250 MG
250 CAPSULE ORAL 2 TIMES DAILY
Status: DISCONTINUED | OUTPATIENT
Start: 2022-09-20 | End: 2022-09-26 | Stop reason: HOSPADM

## 2022-09-20 RX ORDER — ONDANSETRON 2 MG/ML
4 INJECTION INTRAMUSCULAR; INTRAVENOUS ONCE
Status: COMPLETED | OUTPATIENT
Start: 2022-09-20 | End: 2022-09-20

## 2022-09-20 RX ORDER — ONDANSETRON 2 MG/ML
4 INJECTION INTRAMUSCULAR; INTRAVENOUS EVERY 6 HOURS PRN
Status: DISCONTINUED | OUTPATIENT
Start: 2022-09-20 | End: 2022-09-26 | Stop reason: HOSPADM

## 2022-09-20 RX ORDER — PANTOPRAZOLE SODIUM 40 MG/10ML
40 INJECTION, POWDER, LYOPHILIZED, FOR SOLUTION INTRAVENOUS ONCE
Status: COMPLETED | OUTPATIENT
Start: 2022-09-20 | End: 2022-09-20

## 2022-09-20 RX ADMIN — ONDANSETRON 4 MG: 2 INJECTION INTRAMUSCULAR; INTRAVENOUS at 17:09

## 2022-09-20 RX ADMIN — SODIUM CHLORIDE 1000 ML: 0.9 INJECTION, SOLUTION INTRAVENOUS at 17:08

## 2022-09-20 RX ADMIN — Medication 250 MG: at 23:06

## 2022-09-20 RX ADMIN — PANTOPRAZOLE SODIUM 40 MG: 40 INJECTION, POWDER, FOR SOLUTION INTRAVENOUS at 17:17

## 2022-09-20 RX ADMIN — HYDROMORPHONE HYDROCHLORIDE 0.5 MG: 1 INJECTION, SOLUTION INTRAMUSCULAR; INTRAVENOUS; SUBCUTANEOUS at 23:03

## 2022-09-20 RX ADMIN — HYDROMORPHONE HYDROCHLORIDE 1 MG: 1 INJECTION, SOLUTION INTRAMUSCULAR; INTRAVENOUS; SUBCUTANEOUS at 20:45

## 2022-09-20 RX ADMIN — HYDROMORPHONE HYDROCHLORIDE 1 MG: 1 INJECTION, SOLUTION INTRAMUSCULAR; INTRAVENOUS; SUBCUTANEOUS at 18:53

## 2022-09-20 RX ADMIN — HYDROMORPHONE HYDROCHLORIDE 1 MG: 1 INJECTION, SOLUTION INTRAMUSCULAR; INTRAVENOUS; SUBCUTANEOUS at 17:51

## 2022-09-20 RX ADMIN — HYDROMORPHONE HYDROCHLORIDE 1 MG: 1 INJECTION, SOLUTION INTRAMUSCULAR; INTRAVENOUS; SUBCUTANEOUS at 17:13

## 2022-09-20 RX ADMIN — SODIUM CHLORIDE 125 ML/HR: 0.9 INJECTION, SOLUTION INTRAVENOUS at 23:05

## 2022-09-20 RX ADMIN — METRONIDAZOLE 500 MG: 500 TABLET ORAL at 23:06

## 2022-09-20 RX ADMIN — POTASSIUM CHLORIDE 20 MEQ: 14.9 INJECTION, SOLUTION INTRAVENOUS at 23:05

## 2022-09-20 NOTE — ED PROVIDER NOTES
History  Chief Complaint   Patient presents with    Abdominal Pain     Here yesterday for same abd pain and came back because pain is worse     Patient has a significant past medical history of ulcerative colitis status post colectomy with ileal pouch formation  He was seen here yesterday with abdominal pain CT scan showed fluid and inflammatory changes in the pouch  He has an appointment with gastroenterology tomorrow  Of note, patient is already scheduled for surgical reconstruction of the pouch in January  He states that since yesterday the pain in the upper abdomen has become worse  He is nauseous has not been able to eat  States his upper abdomen hurts more when he stands up straight  No fever  No gross rectal bleeding          Prior to Admission Medications   Prescriptions Last Dose Informant Patient Reported? Taking?    DULoxetine (CYMBALTA) 60 mg delayed release capsule   No No   Sig: Take 1 capsule (60 mg total) by mouth daily   Florastor 250 MG capsule   Yes No   SULFASALAZINE PO   Yes No   Sig: Take 500 mg by mouth   metroNIDAZOLE (FLAGYL) 500 mg tablet   No No   Sig: Take 1 tablet (500 mg total) by mouth every 8 (eight) hours   oxyCODONE-acetaminophen (Percocet) 5-325 mg per tablet   No No   Sig: Take 1 tablet by mouth every 6 (six) hours as needed for severe pain Max Daily Amount: 4 tablets   pantoprazole (PROTONIX) 20 mg tablet   No No   Sig: Take 1 tablet (20 mg total) by mouth daily   Patient not taking: No sig reported   saccharomyces boulardii (FLORASTOR) 250 mg capsule   No No   Sig: Take 1 capsule (250 mg total) by mouth 2 (two) times a day      Facility-Administered Medications: None       Past Medical History:   Diagnosis Date    [facility]ylitis ([facility] )     Anxiety     Bowel obstruction (HCC)     Clostridium difficile colitis 9/13/2018    Colitis     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 9/13/2018    Pancreatitis     Ulcerative colitis ([facility] )        Past Surgical History:   Procedure Laterality Date    COLECTOMY TOTAL      with ileal pouch and anastemosis    IR PICC PLACEMENT SINGLE LUMEN  3/1/2022    TOTAL COLECTOMY         History reviewed  No pertinent family history  I have reviewed and agree with the history as documented  E-Cigarette/Vaping    E-Cigarette Use Never User      E-Cigarette/Vaping Substances    Nicotine No     THC No     CBD No     Flavoring No     Other No     Unknown No      Social History     Tobacco Use    Smoking status: Never Smoker    Smokeless tobacco: Never Used   Vaping Use    Vaping Use: Never used   Substance Use Topics    Alcohol use: Not Currently     Comment: pt states 5 a month/socially    Drug use: No       Review of Systems   Constitutional: Negative for chills and fever  HENT: Negative for congestion and sore throat  Eyes: Negative for visual disturbance  Respiratory: Negative for cough and shortness of breath  Cardiovascular: Negative for chest pain  Gastrointestinal: Positive for abdominal pain and nausea  Negative for anal bleeding and blood in stool  Genitourinary: Negative for dysuria  Musculoskeletal: Negative for back pain  Neurological: Positive for weakness  Negative for headaches  Hematological: Does not bruise/bleed easily  Psychiatric/Behavioral: Negative for confusion  All other systems reviewed and are negative  Physical Exam  Physical Exam  Vitals and nursing note reviewed  Constitutional:       Appearance: He is well-developed  HENT:      Head: Normocephalic  Mouth/Throat:      Mouth: Mucous membranes are moist    Eyes:      Extraocular Movements: Extraocular movements intact  Cardiovascular:      Rate and Rhythm: Normal rate and regular rhythm  Heart sounds: Normal heart sounds  Pulmonary:      Effort: Pulmonary effort is normal    Abdominal:      General: Abdomen is flat  Bowel sounds are increased  Palpations: Abdomen is soft  Tenderness:  There is generalized abdominal tenderness  Skin:     General: Skin is warm and dry  Capillary Refill: Capillary refill takes less than 2 seconds  Neurological:      General: No focal deficit present  Mental Status: He is alert     Psychiatric:         Mood and Affect: Mood normal          Behavior: Behavior normal          Vital Signs  ED Triage Vitals   Temperature Pulse Respirations Blood Pressure SpO2   09/20/22 1638 09/20/22 1638 09/20/22 1638 09/20/22 1638 09/20/22 1638   97 8 °F (36 6 °C) 91 18 165/99 97 %      Temp src Heart Rate Source Patient Position - Orthostatic VS BP Location FiO2 (%)   -- 09/20/22 1800 09/20/22 1800 09/20/22 1800 --    Monitor Lying Left arm       Pain Score       09/20/22 1638       10 - Worst Possible Pain           Vitals:    09/20/22 1900 09/20/22 1930 09/20/22 2000 09/20/22 2044   BP: (!) 158/110 (!) 168/116 137/91 126/67   Pulse: 84 74 72 78   Patient Position - Orthostatic VS: Sitting Sitting Lying Sitting         Visual Acuity      ED Medications  Medications   HYDROmorphone (DILAUDID) injection 1 mg (has no administration in time range)   sodium chloride 0 9 % bolus 1,000 mL (0 mL Intravenous Stopped 9/20/22 1853)   ondansetron (ZOFRAN) injection 4 mg (4 mg Intravenous Given 9/20/22 1709)   HYDROmorphone (DILAUDID) injection 1 mg (1 mg Intravenous Given 9/20/22 1713)   pantoprazole (PROTONIX) injection 40 mg (40 mg Intravenous Given 9/20/22 1717)   HYDROmorphone (DILAUDID) injection 1 mg (1 mg Intravenous Given 9/20/22 1751)   HYDROmorphone (DILAUDID) injection 1 mg (1 mg Intravenous Given 9/20/22 1853)       Diagnostic Studies  Results Reviewed     Procedure Component Value Units Date/Time    Comprehensive metabolic panel [[id number]]  (Abnormal) Collected: 09/20/22 1707    Lab Status: Final result Specimen: Blood from Arm, Right Updated: 09/20/22 1735     Sodium 138 mmol/L      Potassium 3 4 mmol/L      Chloride 102 mmol/L      CO2 29 mmol/L      ANION GAP 7 mmol/L      BUN 10 mg/dL Creatinine 1 21 mg/dL      Glucose 106 mg/dL      Calcium 8 8 mg/dL      AST 22 U/L      ALT 36 U/L      Alkaline Phosphatase 91 U/L      Total Protein 7 5 g/dL      Albumin 3 6 g/dL      Total Bilirubin 0 70 mg/dL      eGFR 80 ml/min/1 73sq m     Narrative:      [employer] guidelines for Chronic Kidney Disease (CKD):     Stage 1 with normal or high GFR (GFR > 90 mL/min/1 73 square meters)    Stage 2 Mild CKD (GFR = 60-89 mL/min/1 73 square meters)    Stage 3A Moderate CKD (GFR = 45-59 mL/min/1 73 square meters)    Stage 3B Moderate CKD (GFR = 30-44 mL/min/1 73 square meters)    Stage 4 Severe CKD (GFR = 15-29 mL/min/1 73 square meters)    Stage 5 End Stage CKD (GFR <15 mL/min/1 73 square meters)  Note: GFR calculation is accurate only with a steady state creatinine    Lipase [[id number]]  (Abnormal) Collected: 09/20/22 1707    Lab Status: Final result Specimen: Blood from Arm, Right Updated: 09/20/22 1735     Lipase 68 u/L     CBC and differential [[id number]]  (Abnormal) Collected: 09/20/22 1707    Lab Status: Final result Specimen: Blood from Arm, Right Updated: 09/20/22 1720     WBC 9 46 Thousand/uL      RBC 5 72 Million/uL      Hemoglobin 10 8 g/dL      Hematocrit 36 6 %      MCV 64 fL      MCH 18 9 pg      MCHC 29 5 g/dL      RDW 18 1 %      MPV 8 6 fL      Platelets 738 Thousands/uL      nRBC 0 /100 WBCs      Neutrophils Relative 65 %      Immat GRANS % 0 %      Lymphocytes Relative 20 %      Monocytes Relative 12 %      Eosinophils Relative 2 %      Basophils Relative 1 %      Neutrophils Absolute 6 21 Thousands/µL      Immature Grans Absolute 0 04 Thousand/uL      Lymphocytes Absolute 1 85 Thousands/µL      Monocytes Absolute 1 11 Thousand/µL      Eosinophils Absolute 0 15 Thousand/µL      Basophils Absolute 0 10 Thousands/µL                  XR abdomen obstruction series    (Results Pending)              Procedures  Procedures         ED Course SBIRT 20yo+    Flowsheet Row Most Recent Value   SBIRT (25 yo +)    In order to provide better care to our patients, we are screening all of our patients for alcohol and drug use  Would it be okay to ask you these screening questions? No Filed at: 09/20/2022 1721                    [facility]  Number of Diagnoses or Management Options  Diagnosis management comments: Patient has a complicated medical and surgical history  CT scan from yesterday reviewed  Will medicate for pain and nausea, Protonix ordered      Disposition  Final diagnoses:   Abdominal pain     Time reflects when diagnosis was documented in both MDM as applicable and the Disposition within this note     Time User Action Codes Description Comment    9/20/2022  8:45 PM Jeremiah JIMENEZ Add [R10 9] Abdominal pain       ED Disposition     ED Disposition   Admit    Condition   Stable    Date/Time   Tue Sep 20, 2022  8:45 PM    Comment   Case was discussed with hospitalist and the patient's admission status was agreed to be Admission Status: observation status to the service of Dr George Singh   Follow-up Information    None         Patient's Medications   Discharge Prescriptions    No medications on file       No discharge procedures on file      PDMP Review       Value Time User    PDMP Reviewed  Yes 8/27/2022 12:28 PM [address] [facility], DO          ED Provider  Electronically Signed by           Tarik Bennett MD  09/20/22 5641

## 2022-09-20 NOTE — TELEPHONE ENCOUNTER
Patient was seen in the ED for abdominal pains yesterday  He is requesting a hospital f/u appt with Dr Adi Hernández  He is still having abdominal pains today  Please call; pt very anxious to get appt w/Dr Adi Hernández and does not wish to schedule w/Dr Jefe Acevedo per d/c paperwork  Patient has hx chronic GI issues

## 2022-09-21 LAB
ANION GAP SERPL CALCULATED.3IONS-SCNC: 6 MMOL/L (ref 4–13)
BUN SERPL-MCNC: 10 MG/DL (ref 5–25)
CALCIUM SERPL-MCNC: 8 MG/DL (ref 8.3–10.1)
CHLORIDE SERPL-SCNC: 105 MMOL/L (ref 96–108)
CO2 SERPL-SCNC: 26 MMOL/L (ref 21–32)
CREAT SERPL-MCNC: 1.1 MG/DL (ref 0.6–1.3)
ERYTHROCYTE [DISTWIDTH] IN BLOOD BY AUTOMATED COUNT: 17.3 % (ref 11.6–15.1)
GFR SERPL CREATININE-BSD FRML MDRD: 90 ML/MIN/1.73SQ M
GLUCOSE P FAST SERPL-MCNC: 79 MG/DL (ref 65–99)
GLUCOSE SERPL-MCNC: 79 MG/DL (ref 65–140)
HCT VFR BLD AUTO: 33.6 % (ref 36.5–49.3)
HGB BLD-MCNC: 9.7 G/DL (ref 12–17)
MCH RBC QN AUTO: 19.1 PG (ref 26.8–34.3)
MCHC RBC AUTO-ENTMCNC: 28.9 G/DL (ref 31.4–37.4)
MCV RBC AUTO: 66 FL (ref 82–98)
PLATELET # BLD AUTO: 248 THOUSANDS/UL (ref 149–390)
PMV BLD AUTO: 10.1 FL (ref 8.9–12.7)
POTASSIUM SERPL-SCNC: 3.8 MMOL/L (ref 3.5–5.3)
RBC # BLD AUTO: 5.08 MILLION/UL (ref 3.88–5.62)
SODIUM SERPL-SCNC: 137 MMOL/L (ref 135–147)
WBC # BLD AUTO: 4.47 THOUSAND/UL (ref 4.31–10.16)

## 2022-09-21 PROCEDURE — 80048 BASIC METABOLIC PNL TOTAL CA: CPT

## 2022-09-21 PROCEDURE — C9113 INJ PANTOPRAZOLE SODIUM, VIA: HCPCS

## 2022-09-21 PROCEDURE — 99223 1ST HOSP IP/OBS HIGH 75: CPT | Performed by: FAMILY MEDICINE

## 2022-09-21 PROCEDURE — 99222 1ST HOSP IP/OBS MODERATE 55: CPT | Performed by: NURSE PRACTITIONER

## 2022-09-21 PROCEDURE — 85025 COMPLETE CBC W/AUTO DIFF WBC: CPT

## 2022-09-21 RX ORDER — HYDROMORPHONE HCL/PF 1 MG/ML
0.5 SYRINGE (ML) INJECTION ONCE
Status: COMPLETED | OUTPATIENT
Start: 2022-09-21 | End: 2022-09-21

## 2022-09-21 RX ADMIN — HYDROMORPHONE HYDROCHLORIDE 0.5 MG: 1 INJECTION, SOLUTION INTRAMUSCULAR; INTRAVENOUS; SUBCUTANEOUS at 14:02

## 2022-09-21 RX ADMIN — Medication 250 MG: at 17:19

## 2022-09-21 RX ADMIN — HYDROMORPHONE HYDROCHLORIDE 0.5 MG: 1 INJECTION, SOLUTION INTRAMUSCULAR; INTRAVENOUS; SUBCUTANEOUS at 06:49

## 2022-09-21 RX ADMIN — SODIUM CHLORIDE 125 ML/HR: 0.9 INJECTION, SOLUTION INTRAVENOUS at 14:07

## 2022-09-21 RX ADMIN — HYDROMORPHONE HYDROCHLORIDE 0.5 MG: 1 INJECTION, SOLUTION INTRAMUSCULAR; INTRAVENOUS; SUBCUTANEOUS at 09:49

## 2022-09-21 RX ADMIN — HYDROMORPHONE HYDROCHLORIDE 0.5 MG: 1 INJECTION, SOLUTION INTRAMUSCULAR; INTRAVENOUS; SUBCUTANEOUS at 17:18

## 2022-09-21 RX ADMIN — DULOXETINE HYDROCHLORIDE 60 MG: 60 CAPSULE, DELAYED RELEASE ORAL at 09:48

## 2022-09-21 RX ADMIN — ONDANSETRON 4 MG: 2 INJECTION INTRAMUSCULAR; INTRAVENOUS at 12:10

## 2022-09-21 RX ADMIN — ONDANSETRON 4 MG: 2 INJECTION INTRAMUSCULAR; INTRAVENOUS at 18:16

## 2022-09-21 RX ADMIN — ONDANSETRON 4 MG: 2 INJECTION INTRAMUSCULAR; INTRAVENOUS at 23:59

## 2022-09-21 RX ADMIN — PANTOPRAZOLE SODIUM 40 MG: 40 INJECTION, POWDER, FOR SOLUTION INTRAVENOUS at 09:48

## 2022-09-21 RX ADMIN — METRONIDAZOLE 500 MG: 500 TABLET ORAL at 21:31

## 2022-09-21 RX ADMIN — PANTOPRAZOLE SODIUM 40 MG: 40 INJECTION, POWDER, FOR SOLUTION INTRAVENOUS at 21:31

## 2022-09-21 RX ADMIN — HYDROMORPHONE HYDROCHLORIDE 0.5 MG: 1 INJECTION, SOLUTION INTRAMUSCULAR; INTRAVENOUS; SUBCUTANEOUS at 23:59

## 2022-09-21 RX ADMIN — Medication 250 MG: at 09:48

## 2022-09-21 RX ADMIN — METRONIDAZOLE 500 MG: 500 TABLET ORAL at 14:02

## 2022-09-21 RX ADMIN — HYDROMORPHONE HYDROCHLORIDE 0.5 MG: 1 INJECTION, SOLUTION INTRAMUSCULAR; INTRAVENOUS; SUBCUTANEOUS at 03:42

## 2022-09-21 RX ADMIN — HYDROMORPHONE HYDROCHLORIDE 0.5 MG: 1 INJECTION, SOLUTION INTRAMUSCULAR; INTRAVENOUS; SUBCUTANEOUS at 20:43

## 2022-09-21 RX ADMIN — METRONIDAZOLE 500 MG: 500 TABLET ORAL at 06:52

## 2022-09-21 RX ADMIN — HYDROMORPHONE HYDROCHLORIDE 0.5 MG: 1 INJECTION, SOLUTION INTRAMUSCULAR; INTRAVENOUS; SUBCUTANEOUS at 00:31

## 2022-09-21 NOTE — ASSESSMENT & PLAN NOTE
Pt admitted last month with similar complaints  · Continue flagyl  · No indication for steroids at that time  · Appreciate recommendations from GI

## 2022-09-21 NOTE — PLAN OF CARE
Problem: Nutrition/Hydration-ADULT  Goal: Nutrient/Hydration intake appropriate for improving, restoring or maintaining nutritional needs  Description: Monitor and assess patient's nutrition/hydration status for malnutrition  Collaborate with interdisciplinary team and initiate plan and interventions as ordered  Monitor patient's weight and dietary intake as ordered or per policy  Utilize nutrition screening tool and intervene as necessary  Determine patient's food preferences and provide high-protein, high-caloric foods as appropriate       INTERVENTIONS:  - Monitor oral intake, urinary output, labs, and treatment plans  - Assess nutrition and hydration status and recommend course of action  - Evaluate amount of meals eaten  - Assist patient with eating if necessary   - Allow adequate time for meals  - Recommend/ encourage appropriate diets, oral nutritional supplements, and vitamin/mineral supplements  - Order, calculate, and assess calorie counts as needed  - Recommend, monitor, and adjust tube feedings and TPN/PPN based on assessed needs  - Assess need for intravenous fluids  - Provide specific nutrition/hydration education as appropriate  - Include patient/family/caregiver in decisions related to nutrition  Outcome: Progressing     Problem: PAIN - ADULT  Goal: Verbalizes/displays adequate comfort level or baseline comfort level  Description: Interventions:  - Encourage patient to monitor pain and request assistance  - Assess pain using appropriate pain scale  - Administer analgesics based on type and severity of pain and evaluate response  - Implement non-pharmacological measures as appropriate and evaluate response  - Consider cultural and social influences on pain and pain management  - Notify physician/advanced practitioner if interventions unsuccessful or patient reports new pain  Outcome: Progressing     Problem: INFECTION - ADULT  Goal: Absence or prevention of progression during hospitalization  Description: INTERVENTIONS:  - Assess and monitor for signs and symptoms of infection  - Monitor lab/diagnostic results  - Monitor all insertion sites, i e  indwelling lines, tubes, and drains  - Monitor endotracheal if appropriate and nasal secretions for changes in amount and color  - [place] appropriate cooling/warming therapies per order  - Administer medications as ordered  - Instruct and encourage patient and family to use good hand hygiene technique  - Identify and instruct in appropriate isolation precautions for identified infection/condition  Outcome: Progressing     Problem: DISCHARGE PLANNING  Goal: Discharge to home or other facility with appropriate resources  Description: INTERVENTIONS:  - Identify barriers to discharge w/patient and caregiver  - Arrange for needed discharge resources and transportation as appropriate  - Identify discharge learning needs (meds, wound care, etc )  - Arrange for interpretive services to assist at discharge as needed  - Refer to Case Management Department for coordinating discharge planning if the patient needs post-hospital services based on physician/advanced practitioner order or complex needs related to functional status, cognitive ability, or social support system  Outcome: Progressing     Problem: Knowledge Deficit  Goal: Patient/family/caregiver demonstrates understanding of disease process, treatment plan, medications, and discharge instructions  Description: Complete learning assessment and assess knowledge base    Interventions:  - Provide teaching at level of understanding  - Provide teaching via preferred learning methods  Outcome: Progressing

## 2022-09-21 NOTE — H&P
Stephanie 47 1995, 32 y o  male MRN: [id number]  Unit/Bed#: [address] [place] [address] Encounter: [id number]  Primary Care Provider: Carlos Manuel Olivia DO   Date and time admitted to hospital: 9/20/2022  4:39 PM    * Intractable abdominal pain  Assessment & Plan  Patient presents with intractable epigastric pain  · History of ulcerative colitis status post total proctocolectomy with J-pouch ileoanal anastomosis, anastomotic stricture and persistent inflammation as well as questionable pouchitis and possible underlying Crohn's disease  · Patient recently discussed with surgeon in [place], plan for surgical reconstruction January 11th  · Xray abdomen shows Persistent gaseous distention of the ileal pouch with some mildly dilated air-filled bowel loops in the mid abdomen although overall improved compared to  image from recent CT examination dated 9/19/2022 ? Reactive ileus in the setting of distal ileal pouch inflammation as seen and described on prior CT  · Required 4mg dilaudid in ED with minimal improvement of pain  · Continue flagyl daily  · Pantoprazole IV BID  · NPO  · IVFs  · GI consult     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[facility] )  Assessment & Plan  Pt admitted last month with similar complaints  · Continue flagyl  · No indication for steroids at that time  · Appreciate recommendations from GI    History of Clostridium difficile infection  Assessment & Plan  History of c diff  · No episodes of diarrhea    Ankylosing spondylitis ([facility] )  Assessment & Plan  Not currently on medication    CAR (generalized anxiety disorder)  Assessment & Plan  Continue cymbalta    VTE Pharmacologic Prophylaxis:   Moderate Risk (Score 3-4) - Pharmacological DVT Prophylaxis Ordered: enoxaparin (Lovenox)  Code Status: Level 1 - Full Code   Discussion with family: Patient declined call to        Anticipated Length of Stay: Patient will be admitted on an observation basis with an anticipated length of stay of less than 2 midnights secondary to abdominal pain  Total Time for Visit, including Counseling / Coordination of Care: 45 minutes Greater than 50% of this total time spent on direct patient counseling and coordination of care  Chief Complaint: abdominal pain    History of Present Illness:  Larry Howard is a 32 y o  male with a PMH of ulcerative colitis status post total proctocolectomy with J-pouch ileal anal anastomosis, anastomotic stricture and persistent inflammation, questionable pouchitis who presents with abdominal pain  Patient states he has had abdominal pain over the last 2 days  He was seen in the ED on 09/19 and CT scan showed fluid and inflammatory changes in the pouch  He was scheduled to see GI today but came in due to severe epigastric pain  He sees pain is a constant 710 occasionally be more severe  He states he is episode of vomiting prior to arrival   Patient has not been able to eat or drink very much for the past day  He states he is scheduled for surgical reconstruction in January with his surgeon in [place]  Denies any fevers, chills, chest pain, shortness for breath, cough, hematochezia, melena, dysuria, hematuria  Review of Systems:  Review of Systems   Constitutional: Negative for chills and fever  HENT: Negative for ear pain and sore throat  Eyes: Negative for pain and visual disturbance  Respiratory: Negative for cough and shortness of breath  Cardiovascular: Negative for chest pain and palpitations  Gastrointestinal: Positive for abdominal pain and vomiting  Genitourinary: Negative for dysuria and hematuria  Musculoskeletal: Negative for arthralgias and back pain  Skin: Negative for color change and rash  Neurological: Negative for dizziness and light-headedness  All other systems reviewed and are negative        Past Medical and Surgical History:   Past Medical History:   Diagnosis Date    [facility]yl[facility] sp[facility]yl[facility] )     Anxiety     Bowel obstruction (HCC)     Clostridium difficile colitis 9/13/2018    Colitis     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[facility] ) 9/13/2018    Pancreatitis     Ulcerative colitis ([facility] )        Past Surgical History:   Procedure Laterality Date    COLECTOMY TOTAL      with ileal pouch and anastemosis    IR PICC PLACEMENT SINGLE LUMEN  3/1/2022    TOTAL COLECTOMY         Meds/Allergies:  Prior to Admission medications    Medication Sig Start Date End Date Taking? Authorizing Provider   DULoxetine (CYMBALTA) 60 mg delayed release capsule Take 1 capsule (60 mg total) by mouth daily 6/10/22  Yes Ary Walter DO   metroNIDAZOLE (FLAGYL) 500 mg tablet Take 1 tablet (500 mg total) by mouth every 8 (eight) hours 8/27/22 10/26/22 Yes Darlin Martinez DO   oxyCODONE-acetaminophen (Percocet) 5-325 mg per tablet Take 1 tablet by mouth every 6 (six) hours as needed for severe pain Max Daily Amount: 4 tablets 9/19/22  Yes Ary Walter DO   Florastor 250 MG capsule  8/27/22   Historical Provider, MD   saccharomyces boulardii (FLORASTOR) 250 mg capsule Take 1 capsule (250 mg total) by mouth 2 (two) times a day 8/27/22   Darlin Martinez DO   SULFASALAZINE PO Take 500 mg by mouth    Historical Provider, MD     I have reviewed home medications with patient personally  Allergies:    Allergies   Allergen Reactions    Cefazolin Hives     Other reaction(s): Arrythmia (Abnormal Heartbeat), Rash Maculopapular    Mesalamine GI Intolerance     Other reaction(s): Pancreatitis  Annotation - [id number]: pancreatitis       Social History:  Marital Status: Single   Occupation:   Patient Pre-hospital Living Situation: Home  Patient Pre-hospital Level of Mobility: walks  Patient Pre-hospital Diet Restrictions:   Substance Use History:   Social History     Substance and Sexual Activity   Alcohol Use Not Currently    Comment: pt states 5 a month/socially     Social History     Tobacco Use   Smoking Status Never Smoker   Smokeless Tobacco Never Used     Social History     Substance and Sexual Activity   Drug Use No       Family History:  History reviewed  No pertinent family history  Physical Exam:     Vitals:   Blood Pressure: 121/65 (09/21/22 0336)  Pulse: 68 (09/21/22 0336)  Temperature: 97 5 °F (36 4 °C) (09/21/22 0336)  Temp Source: Temporal (09/21/22 0336)  Respirations: 20 (09/21/22 0336)  Height: 5' 9" (175 3 cm) (09/20/22 1638)  Weight - Scale: 82 6 kg (182 lb) (09/20/22 1638)  SpO2: 96 % (09/21/22 0336)    Physical Exam  Vitals and nursing note reviewed  Constitutional:       Appearance: He is well-developed  HENT:      Head: Normocephalic and atraumatic  Eyes:      Conjunctiva/sclera: Conjunctivae normal    Cardiovascular:      Rate and Rhythm: Normal rate and regular rhythm  Heart sounds: No murmur heard  Pulmonary:      Effort: Pulmonary effort is normal  No respiratory distress  Breath sounds: Normal breath sounds  Abdominal:      General: Abdomen is flat  Palpations: Abdomen is soft  Tenderness: There is abdominal tenderness  Comments: Epigastric tenderness, voluntary guarding   Musculoskeletal:      Cervical back: Neck supple  Skin:     General: Skin is warm and dry  Neurological:      Mental Status: He is alert            Additional Data:     Lab Results:  Results from last 7 days   Lab Units 09/20/22  1707   WBC Thousand/uL 9 46   HEMOGLOBIN g/dL 10 8*   HEMATOCRIT % 36 6   PLATELETS Thousands/uL 364   NEUTROS PCT % 65   LYMPHS PCT % 20   MONOS PCT % 12   EOS PCT % 2     Results from last 7 days   Lab Units 09/20/22  1707   SODIUM mmol/L 138   POTASSIUM mmol/L 3 4*   CHLORIDE mmol/L 102   CO2 mmol/L 29   BUN mg/dL 10   CREATININE mg/dL 1 21   ANION GAP mmol/L 7   CALCIUM mg/dL 8 8   ALBUMIN g/dL 3 6   TOTAL BILIRUBIN mg/dL 0 70   ALK PHOS U/L 91   ALT U/L 36   AST U/L 22   GLUCOSE RANDOM mg/dL 106                       Imaging: Reviewed radiology reports from this admission including: xray(s)  XR abdomen obstruction series   Final Result by Mark Cobos MD (09/20 2221)      Persistent gaseous distention of the ileal pouch with some mildly dilated air-filled bowel loops in the mid abdomen although overall improved compared to  image from recent CT examination dated 9/19/2022 ? Reactive ileus in the setting of distal    ileal pouch inflammation as seen and described on prior CT  Continued follow-up recommended  Workstation performed: [id number]             EKG and Other Studies Reviewed on Admission:   · EKG: No EKG obtained  ** Please Note: This note has been constructed using a voice recognition system   **

## 2022-09-21 NOTE — UTILIZATION REVIEW
Initial Clinical Review    Observation 9/20/22 @ 2046 converted to inpatient admission 9/21/22 @ 426 [id number] for continued care & tx for intractable abd pain  Admission: Date/Time/Statement:   Admission Orders (From admission, onward)     Ordered        09/21/22 1641  Inpatient Admission  Once            09/20/22 2046  Place in Observation  Once                      Orders Placed This Encounter   Procedures    Inpatient Admission     Standing Status:   Standing     Number of Occurrences:   1     Order Specific Question:   Level of Care     Answer:   Med Surg [16]     Order Specific Question:   Estimated length of stay     Answer:   More than 2 Midnights     Order Specific Question:   Certification     Answer:   I certify that inpatient services are medically necessary for this patient for a duration of greater than two midnights  See H&P and MD Progress Notes for additional information about the patient's course of treatment  ED Arrival Information     Expected   -    Arrival   9/20/2022 16:36    Acuity   Urgent            Means of arrival   Walk-In    Escorted by   Self    Service   Hospitalist    Admission type   Urgent            Arrival complaint   Abdominal pain           Chief Complaint   Patient presents with    Abdominal Pain     Here yesterday for same abd pain and came back because pain is worse     Initial Presentation:  32 yom to ER from home c/o increasing abd pain x 2 days  He was seen in the ED on 09/19 and CT scan showed fluid and inflammatory changes in the pouch  He was scheduled to see GI today but came in due to severe epigastric pain  Hx ulcerative colitis status post total proctocolectomy with J-pouch ileal anal anastomosis, anastomotic stricture and persistent inflammation, questionable pouchitis  Presents with s/s as above, epigastric tenderness, voluntary guarding   Placed in observation status for intractable abd pain, GI consulted    Observation to IP admission 9/21/22:  Persistent abd pain, N&V requiring IV Dilaudid & IV Zofran respectively  Abd with persistent generalized tenderness  Trial clear liquids per GI  Per GI: abd pain, N&V  Reactive ileus in the setting of distal ileal polyps inflammation of seen in described on prior CT  Abdomen tender to palpation, rebound  Patient reports that when  palpating in lower abdomen he feels pain/tenderness in upper abdomen  trial clear liquids, continue IVF, Flagyl, PPI, antiemetics  Day 2- 9/22/22:  Persistent abd pain & nausea requiring continued doses IV Dilaudid & Zofran  Abd with generalized tenderness throughout with palpation, +BM 2-3x/d  Remains on clear liquids at this time, able to tolerate small sips, IVF maintained       ED Triage Vitals   Temperature Pulse Respirations Blood Pressure SpO2   09/20/22 1638 09/20/22 1638 09/20/22 1638 09/20/22 1638 09/20/22 1638   97 8 °F (36 6 °C) 91 18 165/99 97 %      Temp Source Heart Rate Source Patient Position - Orthostatic VS BP Location FiO2 (%)   09/20/22 2215 09/20/22 1800 09/20/22 1800 09/20/22 1800 --   Oral Monitor Lying Left arm       Pain Score       09/20/22 1638       10 - Worst Possible Pain          Wt Readings from Last 1 Encounters:   09/20/22 82 6 kg (182 lb)     Additional Vital Signs:   Date/Time Temp Pulse Resp BP MAP (mmHg) SpO2 O2 Device Patient Position - Orthostatic VS   09/21/22 0705 98 7 °F (37 1 °C) 68 20 107/58 77 95 % None (Room air) Lying   09/21/22 0336 97 5 °F (36 4 °C) 68 20 121/65 85 96 % None (Room air) Lying   09/20/22 2215 98 8 °F (37 1 °C) 89 20 161/79 101 96 % None (Room air) Lying   09/20/22 2044 -- 78 20 126/67 89 97 % None (Room air) Sitting   09/20/22 2000 -- 72 18 137/91 110 92 % None (Room air) Lying   09/20/22 1930 -- 74 18 168/116 Abnormal  138 93 % None (Room air) Sitting   09/20/22 1900 -- 84 18 158/110 Abnormal  131 95 % None (Room air) Sitting   09/20/22 1830 -- 82 18 174/118 Abnormal  140 97 % None (Room air) Lying   09/20/22 1800 -- 80 18 166/108 Abnormal  128 95 % None (Room air) Lying     Pertinent Labs/Diagnostic Test Results:   XR abdomen obstruction series   Final Result  (09/20 2221)      Persistent gaseous distention of the ileal pouch with some mildly dilated air-filled bowel loops in the mid abdomen although overall improved compared to  image from recent CT examination dated 9/19/2022 ? Reactive ileus in the setting of distal    ileal pouch inflammation as seen and described on prior CT  Continued follow-up recommended          Results from last 7 days   Lab Units 09/22/22  0636 09/21/22  0646 09/20/22  1707 09/19/22  0836   WBC Thousand/uL 8 12 4 47 9 46 9 98   HEMOGLOBIN g/dL 10 4* 9 7* 10 8* 11 6*   HEMATOCRIT % 34 9* 33 6* 36 6 39 0   PLATELETS Thousands/uL 323 248 364 408*   NEUTROS ABS Thousands/µL  --   --  6 21 6 76     Results from last 7 days   Lab Units 09/22/22  0636 09/21/22  0646 09/20/22 1707 09/19/22  0836   SODIUM mmol/L 136 137 138 136   POTASSIUM mmol/L 3 9 3 8 3 4* 3 7   CHLORIDE mmol/L 103 105 102 99   CO2 mmol/L 28 26 29 28   ANION GAP mmol/L 5 6 7 9   BUN mg/dL 7 10 10 13   CREATININE mg/dL 1 02 1 10 1 21 1 09   EGFR ml/min/1 73sq m 98 90 80 91   CALCIUM mg/dL 8 4 8 0* 8 8 8 9     Results from last 7 days   Lab Units 09/20/22  1707 09/19/22  0836   AST U/L 22 19   ALT U/L 36 35   ALK PHOS U/L 91 96   TOTAL PROTEIN g/dL 7 5 7 9   ALBUMIN g/dL 3 6 3 8   TOTAL BILIRUBIN mg/dL 0 70 0 63     Results from last 7 days   Lab Units 09/22/22  0636 09/21/22  0646 09/20/22  1707 09/19/22  0836   GLUCOSE RANDOM mg/dL 83 79 106 93     Results from last 7 days   Lab Units 09/20/22  1707 09/19/22  0836   LIPASE u/L 68* 187     ED Treatment:   Medication Administration from 09/20/2022 1635 to 09/20/2022 2211       Date/Time Order Dose Route Action     09/20/2022 1708 sodium chloride 0 9 % bolus 1,000 mL 1,000 mL Intravenous New Bag     09/20/2022 1709 ondansetron (ZOFRAN) injection 4 mg 4 mg Intravenous Given     09/20/2022 1713 HYDROmorphone (DILAUDID) injection 1 mg 1 mg Intravenous Given     09/20/2022 1717 pantoprazole (PROTONIX) injection 40 mg 40 mg Intravenous Given     09/20/2022 1751 HYDROmorphone (DILAUDID) injection 1 mg 1 mg Intravenous Given     09/20/2022 1853 HYDROmorphone (DILAUDID) injection 1 mg 1 mg Intravenous Given     09/20/2022 2045 HYDROmorphone (DILAUDID) injection 1 mg 1 mg Intravenous Given        Past Medical History:   Diagnosis Date    [facility]     Anxiety     Bowel obstruction (HCC)     Clostridium difficile colitis 9/13/2018    Colitis     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[facility] ) 9/13/2018    Pancreatitis     Ulcerative colitis ([facility] )      Present on Admission:   CAR (generalized anxiety disorder)   History of Clostridium difficile infection   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[facility] )   Ankylosing spondylitis ([facility])    Admitting Diagnosis: Abdominal pain [R10 9]  Age/Sex: 32 y o  male  Admission Orders:  Scd/foot pumps  Consult GI    Scheduled Medications:  DULoxetine, 60 mg, Oral, Daily  enoxaparin, 40 mg, Subcutaneous, Daily  metroNIDAZOLE, 500 mg, Oral, Q8H [facility]  pantoprazole, 40 mg, Intravenous, Q12H [facility]  saccharomyces boulardii, 250 mg, Oral, BID    Continuous IV Infusions:  sodium chloride, 125 mL/hr, Intravenous, Continuous    PRN Meds:  acetaminophen, 650 mg, Oral, Q6H PRN  HYDROmorphone, 0 5 mg, Intravenous, Q3H PRN, 9/20 x1, 9/21 x7, 9/22 x3  ondansetron, 4 mg, Intravenous, Q6H PRN, 9/21 x3, 9/22 x1    Network Utilization Review Department  ATTENTION: Please call with any questions or concerns to [phone] and carefully listen to the prompts so that you are directed to the right person  All voicemails are confidential   Cesario Lucero all requests for admission clinical reviews, approved or denied determinations and any other requests to dedicated fax number below belonging to the campus where the patient is receiving treatment   List of dedicated fax numbers for the Facilities:  [facility] ADMISSION DENIALS (Administrative/Medical Necessity) [phone]   [address] (Maternity/NICU/Pediatrics) 261 [facility],[address] Floor [facility] 40 125 [facility]  [phone]   [address] [facility] [place] [address] [address] [place] [address] [address] [facility] [address] [phone]

## 2022-09-21 NOTE — CASE MANAGEMENT
Case Management Assessment & Discharge Planning Note    Patient name Jeanine Saldana  Location [address] [facility] [address]-* MRN [id number]  : 1995 Date 2022       Current Admission Date: 2022  Current Admission Diagnosis:Intractable abdominal pain   Patient Active Problem List    Diagnosis Date Noted    Elevated C-reactive protein (CRP) 2022    Intractable abdominal pain 2022    Partial small bowel obstruction ([address] ) 2022    Rectal obstruction 2022    History of Clostridium difficile infection 2022    Epigastric abdominal pain 2022    Abnormal CT scan of lung 2022    Presumed AV endocarditis 2022    Anemia 2022    Polymicrobial after streptococcal bacteremia 2022    Enteritis 2022    [facility]ylosing spondylitis ([facility] ) 2022    Elevated LFTs 2022    Arm and leg movements, uncontrollable 2021    Seizure-like activity ([facility] ) 2021    Arthralgia 09/15/2020    Sacroiliac joint dysfunction of right side 2020    Left groin pain 2019    Left-sided low back pain without sciatica 489    Complications of intestinal pouch ([facility] ) 2019    History of ulcerative colitis 2019    CAR (generalized anxiety disorder) 10/19/2018    BMI 26 0-26 9,adult 10/19/2018    Chronic ulcerative pancolitis ([facility] ) 2018    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[facility] ) 2018    Stricture of rectum 2018      LOS (days): 0  Geometric Mean LOS (GMLOS) (days):   Days to GMLOS:     OBJECTIVE:        Current admission status: Observation  Referral Reason: Other (Discharge planning)    Preferred Pharmacy:   [address] [facility] [address]  [address] [facility] [address]  Phone: [phone] Fax: [phone]    Primary Care Provider: Viral Beverly DO    Primary Insurance: BLUE CROSS  Secondary Insurance:     ASSESSMENT:  [address], [facility] Representative - Father   Primary Phone: [phone] [facility] [place])  Home Phone: [phone]                Obs Notice Signed: 09/21/22 (Notice reviewed with and signed by patient  Copy given to patient and copy placed in scan bin for chart )    Readmission Root Cause  30 Day Readmission: No    Patient Information  Admitted from[de-identified] Home  Mental Status: Alert  During Assessment patient was accompanied by: Not accompanied during assessment  Assessment information provided by[de-identified] Patient  Primary Caregiver: Self  Support Systems: Family members  [place] of Residence: [address] [place] [address] do you live in?: [address] [place] [address] Road entry access options   Select all that apply : Stairs  Number of steps to enter home : 2  Type of Current Residence: [address] [place] home  In the last 12 months, was there a time when you were not able to pay the mortgage or rent on time?: No  In the last 12 months, how many places have you lived?: 1  In the last 12 months, was there a time when you did not have a steady place to sleep or slept in a shelter (including now)?: No  Homeless/housing insecurity resource given?: N/A  Living Arrangements: Lives w/ Parent(s)  Is patient a ?: No    Activities of Daily Living Prior to Admission  Functional Status: Independent  Completes ADLs independently?: Yes  Ambulates independently?: Yes  Does patient use assisted devices?: No  Does patient currently own DME?: No  Does the patient have a history of Short-Term Rehab?: No  Does patient have a history of HHC?: Yes ([facility] for home infusion)  Does patient currently have [facility]?: No    Patient Information Continued  Income Source: Employed ()  Does patient have prescription coverage?: Yes  Within the past 12 months, you worried that your food would run out before you got the money to buy more : Never true  Within the past 12 months, the food you bought just didn't last and you didn't have money to get more : Never true  Food insecurity resource given?: N/A  Does patient receive dialysis treatments?: No  Does patient have a history of substance abuse?: No  Does patient have a history of Mental Health Diagnosis?: No    Means of Transportation  Means of Transport to Appts[de-identified] Drives Self  In the past 12 months, has lack of transportation kept you from medical appointments or from getting medications?: No  In the past 12 months, has lack of transportation kept you from meetings, work, or from getting things needed for daily living?: No  Was application for public transport provided?: N/A    DISCHARGE DETAILS:    Discharge planning discussed with[de-identified] Patient  Freedom of Choice: Yes  Comments - Freedom of Choice: FOC reviewed with patient, plan is for patient to return home at discharge     DME Referral Provided  Referral made for DME?: No    Other Referral/Resources/Interventions Provided:  Interventions: None Indicated    Would you like to participate in our [address] service program?  : No - Declined    Treatment Team Recommendation: Home  Discharge Destination Plan[de-identified] Home  Transport at Discharge : Family       SW spoke with patient at bedside to introduce role of CM and conduct brief assessment  Per notes and conversation with patient, he remains independent with all needs, works and drives  He does have a history of needing home infusion for IV antibiotics  SW will continue to follow for discharge planning needs

## 2022-09-21 NOTE — PLAN OF CARE
Problem: Nutrition/Hydration-ADULT  Goal: Nutrient/Hydration intake appropriate for improving, restoring or maintaining nutritional needs  Description: Monitor and assess patient's nutrition/hydration status for malnutrition  Collaborate with interdisciplinary team and initiate plan and interventions as ordered  Monitor patient's weight and dietary intake as ordered or per policy  Utilize nutrition screening tool and intervene as necessary  Determine patient's food preferences and provide high-protein, high-caloric foods as appropriate       INTERVENTIONS:  - Monitor oral intake, urinary output, labs, and treatment plans  - Assess nutrition and hydration status and recommend course of action  - Evaluate amount of meals eaten  - Assist patient with eating if necessary   - Allow adequate time for meals  - Recommend/ encourage appropriate diets, oral nutritional supplements, and vitamin/mineral supplements  - Order, calculate, and assess calorie counts as needed  - Recommend, monitor, and adjust tube feedings and TPN/PPN based on assessed needs  - Assess need for intravenous fluids  - Provide specific nutrition/hydration education as appropriate  - Include patient/family/caregiver in decisions related to nutrition  Outcome: Progressing     Problem: PAIN - ADULT  Goal: Verbalizes/displays adequate comfort level or baseline comfort level  Description: Interventions:  - Encourage patient to monitor pain and request assistance  - Assess pain using appropriate pain scale  - Administer analgesics based on type and severity of pain and evaluate response  - Implement non-pharmacological measures as appropriate and evaluate response  - Consider cultural and social influences on pain and pain management  - Notify physician/advanced practitioner if interventions unsuccessful or patient reports new pain  Outcome: Progressing     Problem: INFECTION - ADULT  Goal: Absence or prevention of progression during hospitalization  Description: INTERVENTIONS:  - Assess and monitor for signs and symptoms of infection  - Monitor lab/diagnostic results  - Monitor all insertion sites, i e  indwelling lines, tubes, and drains  - Monitor endotracheal if appropriate and nasal secretions for changes in amount and color  - [place] appropriate cooling/warming therapies per order  - Administer medications as ordered  - Instruct and encourage patient and family to use good hand hygiene technique  - Identify and instruct in appropriate isolation precautions for identified infection/condition  Outcome: Progressing     Problem: DISCHARGE PLANNING  Goal: Discharge to home or other facility with appropriate resources  Description: INTERVENTIONS:  - Identify barriers to discharge w/patient and caregiver  - Arrange for needed discharge resources and transportation as appropriate  - Identify discharge learning needs (meds, wound care, etc )  - Arrange for interpretive services to assist at discharge as needed  - Refer to Case Management Department for coordinating discharge planning if the patient needs post-hospital services based on physician/advanced practitioner order or complex needs related to functional status, cognitive ability, or social support system  Outcome: Progressing     Problem: Knowledge Deficit  Goal: Patient/family/caregiver demonstrates understanding of disease process, treatment plan, medications, and discharge instructions  Description: Complete learning assessment and assess knowledge base    Interventions:  - Provide teaching at level of understanding  - Provide teaching via preferred learning methods  Outcome: Progressing

## 2022-09-21 NOTE — CONSULTS
Physician Requesting Consult: Florin Joseph DO    Reason for Consult / Principal Problem:  Abdominal pain     Assessment/Plan:  77-year-old male with past medical history of ulcerative colitis status post total proctocolectomy with J pouch ileal anal anastomosis, anastomotic stricture, persistent inflammation, question of pouchitis who presents to [facility] on 09/20 with report of abdominal pain  1  Abdominal pain associated with nausea and vomiting  Patient reported abdominal pain which increases in severity in on sent was 9/19 at 2:00 a m  Leonila Wilson Patient also reported associated symptoms of nausea and vomiting  CT abdomen and pelvis with contrast done 9/19 showed fluid in the ileal pouch and minimal thickening of the distal ileum may represent nonspecific distal ileal-pouch itis in the appropriate clinical content  No perienteric inflammatory changes or abscess  X-ray abdominal obstructive series done 9/20 showed persistent gaseous distention of the ileum out with some mildly dilated air-filled bowel loops in the mid abdomen all through overall improved compared to scalp imaging from recent to the exam dated 09/19/2022  Reactive ileus in the setting of distal ileal polyps inflammation of seen in described on prior CT  -Keep NPO  -Continue supportive care and IV hydration  -Pain management per attending  -Continue antiemetics as needed for nausea or vomiting   -Continue Flagyl  -Continue pantoprazole b i d   -Patient is having a revision of J pouch done by surgeon in [place] on January 11, 2023  Will follow  Thank you for the consult  Case discussed with Dr Smita Chen  HPI: Celestino Abad is a 32 y o  male  Intractable abdominal pain    This is a 77-year-old male with past medical history of ulcerative colitis status post total proctocolectomy with J pouch ileal anal anastomosis, anastomotic stricture, persistent inflammation, question of pouchitis who presents to [facility] on 09/20 with report of abdominal pain  Patient reported that abdominal pain has been ongoing since 09/19 at approximately 2:00 a m  Zoë Ortiz Patient did go to the ED on 09/19 and CT scan showed fluid and inflammatory changes in the pouch  Patient was scheduled to follow-up with GI today but due to severe abdomen pain patient presented back to the emergency room for further evaluation  Patient reports that pain is constant and severe  Patient denies any blood in stool, blood from rectal area, or black tarry stool  Patient denies any increase in bowel movements  Patient usually has a which is his normal   Patient also reports nausea and vomiting  Patient reports the last time he vomited was prior to arrival on 09/20/2022  Patient denies any acid reflux, heartburn, or dysphagia  Abdomen is tender to palpation throughout  Patient reports that when I palpate the lower abdomen he feels pain/tenderness in upper epigastric area  X-ray abdominal obstructive series done 9/20 showed persistent gaseous distention of the ileum out with some mildly dilated air-filled bowel loops in the mid abdomen all through overall improved compared to scalp imaging from recent to the exam dated 09/19/2022  Reactive ileus in the setting of distal ileal polyps inflammation of seen in described on prior CT  Continue follow-up recommending  Patient reports he does not take NSAIDs  Patient was taking Flagyl p o  as outpatient  Last EGD done 06/07/2022 which showed normal EGD  Antral biopsy was done for H pylori which was negative  Last flexible sigmoidoscopy done 08/25/2022 which showed  mild inflammation of the proximal pouch with several small focal ulcers, but otherwise normal endoscopic appearance  Biopsies obtained which showed fragments of small intestinal mucosa with mild architectural distortion, mild blunting of the villi, and mild increase in chronic inflammation   Negative for active ileitis, dysplasia, or malignancy  Allergies:    Allergies   Allergen Reactions    Cefazolin Hives     Other reaction(s): Arrythmia (Abnormal Heartbeat), Rash Maculopapular    Mesalamine GI Intolerance     Other reaction(s): Pancreatitis  Annotation - [id number]: pancreatitis       Medications:  Current Facility-Administered Medications:     acetaminophen (TYLENOL) tablet 650 mg, 650 mg, Oral, Q6H PRN, Beula Gertrude, PA-C    DULoxetine (CYMBALTA) delayed release capsule 60 mg, 60 mg, Oral, Daily, Emmanuelle Vecellio, PA-C, 60 mg at 09/21/22 0948    enoxaparin (LOVENOX) subcutaneous injection 40 mg, 40 mg, Subcutaneous, Daily, Emmanuelle Vecellio, PA-C    HYDROmorphone (DILAUDID) injection 0 5 mg, 0 5 mg, Intravenous, Q3H PRN, Emmanuelle Vecellio, PA-C, 0 5 mg at 09/21/22 0949    metroNIDAZOLE (FLAGYL) tablet 500 mg, 500 mg, Oral, Q8H [facility], Emmanuelle Vecellio, PA-C, 500 mg at 09/21/22 1542    ondansetron (ZOFRAN) injection 4 mg, 4 mg, Intravenous, Q6H PRN, Emmanuelle Vecellio, PA-C, 4 mg at 09/21/22 1210    pantoprazole (PROTONIX) injection 40 mg, 40 mg, Intravenous, Q12H [facility], Emmanuelle Vecellio, PA-C, 40 mg at 09/21/22 5820    saccharomyces boulardii (FLORASTOR) capsule 250 mg, 250 mg, Oral, BID, Emmanuelle Vecellio, PA-C, 250 mg at 09/21/22 8956    sodium chloride 0 9 % infusion, 125 mL/hr, Intravenous, Continuous, Emmanuelle Vecellio, PA-C, Last Rate: 125 mL/hr at 09/20/22 2305, 125 mL/hr at 09/20/22 2305    Past Medical history:  Past Medical History:   Diagnosis Date    [facility]     Anxiety     Bowel obstruction ([facility] )     Clostridium difficile colitis 9/13/2018    Colitis     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[facility] ) 9/13/2018    Pancreatitis     Ulcerative colitis ([address] )        Past Surgical History:   Past Surgical History:   Procedure Laterality Date    COLECTOMY TOTAL      with ileal pouch and anastemosis    IR PICC PLACEMENT SINGLE LUMEN  3/1/2022    TOTAL COLECTOMY         Social history:   Social History Tobacco Use    Smoking status: Never Smoker    Smokeless tobacco: Never Used   Vaping Use    Vaping Use: Never used   Substance Use Topics    Alcohol use: Not Currently     Comment: pt states 5 a month/socially    Drug use: No       Family history: History reviewed  No pertinent family history  Review of Systems: Review of Systems   Gastrointestinal: Positive for abdominal pain, nausea and vomiting  All other systems reviewed and are negative  Physical Exam: Physical Exam  Vitals and nursing note reviewed  Constitutional:       General: He is not in acute distress  HENT:      Head: Normocephalic and atraumatic  Cardiovascular:      Rate and Rhythm: Normal rate and regular rhythm  Pulses: Normal pulses  Heart sounds: Normal heart sounds  Pulmonary:      Effort: Pulmonary effort is normal  No respiratory distress  Breath sounds: Normal breath sounds  No stridor  No wheezing, rhonchi or rales  Abdominal:      General: Bowel sounds are normal  There is no distension  Palpations: Abdomen is soft  There is no mass  Tenderness: There is abdominal tenderness  There is rebound  There is no guarding  Hernia: No hernia is present  Comments: Abdomen tender to palpation  Patient reports that when I palpate in lower abdomen he feels pain/tenderness in upper abdomen  Musculoskeletal:      Cervical back: Normal range of motion and neck supple  Right lower leg: No edema  Left lower leg: No edema  Skin:     General: Skin is warm and dry  Capillary Refill: Capillary refill takes less than 2 seconds  Coloration: Skin is not jaundiced or pale  Neurological:      Mental Status: He is alert and oriented to person, place, and time     Psychiatric:         Mood and Affect: Mood normal            Lab Results:   Recent Results (from the past 24 hour(s))   CBC and differential    Collection Time: 09/20/22  5:07 PM   Result Value Ref Range    WBC 9 46 4 31 - 10 16 Thousand/uL    RBC 5 72 (H) 3 88 - 5 62 Million/uL    Hemoglobin 10 8 (L) 12 0 - 17 0 g/dL    Hematocrit 36 6 36 5 - 49 3 %    MCV 64 (L) 82 - 98 fL    MCH 18 9 (L) 26 8 - 34 3 pg    MCHC 29 5 (L) 31 4 - 37 4 g/dL    RDW 18 1 (H) 11 6 - 15 1 %    MPV 8 6 (L) 8 9 - 12 7 fL    Platelets 965 279 - 528 Thousands/uL    nRBC 0 /100 WBCs    Neutrophils Relative 65 43 - 75 %    Immat GRANS % 0 0 - 2 %    Lymphocytes Relative 20 14 - 44 %    Monocytes Relative 12 4 - 12 %    Eosinophils Relative 2 0 - 6 %    Basophils Relative 1 0 - 1 %    Neutrophils Absolute 6 21 1 85 - 7 62 Thousands/µL    Immature Grans Absolute 0 04 0 00 - 0 20 Thousand/uL    Lymphocytes Absolute 1 85 0 60 - 4 47 Thousands/µL    Monocytes Absolute 1 11 0 17 - 1 22 Thousand/µL    Eosinophils Absolute 0 15 0 00 - 0 61 Thousand/µL    Basophils Absolute 0 10 0 00 - 0 10 Thousands/µL   Comprehensive metabolic panel    Collection Time: 09/20/22  5:07 PM   Result Value Ref Range    Sodium 138 135 - 147 mmol/L    Potassium 3 4 (L) 3 5 - 5 3 mmol/L    Chloride 102 96 - 108 mmol/L    CO2 29 21 - 32 mmol/L    ANION GAP 7 4 - 13 mmol/L    BUN 10 5 - 25 mg/dL    Creatinine 1 21 0 60 - 1 30 mg/dL    Glucose 106 65 - 140 mg/dL    Calcium 8 8 8 3 - 10 1 mg/dL    AST 22 5 - 45 U/L    ALT 36 12 - 78 U/L    Alkaline Phosphatase 91 46 - 116 U/L    Total Protein 7 5 6 4 - 8 4 g/dL    Albumin 3 6 3 5 - 5 0 g/dL    Total Bilirubin 0 70 0 20 - 1 00 mg/dL    eGFR 80 ml/min/1 73sq m   Lipase    Collection Time: 09/20/22  5:07 PM   Result Value Ref Range    Lipase 68 (L) 73 - 393 u/L   Basic metabolic panel    Collection Time: 09/21/22  6:46 AM   Result Value Ref Range    Sodium 137 135 - 147 mmol/L    Potassium 3 8 3 5 - 5 3 mmol/L    Chloride 105 96 - 108 mmol/L    CO2 26 21 - 32 mmol/L    ANION GAP 6 4 - 13 mmol/L    BUN 10 5 - 25 mg/dL    Creatinine 1 10 0 60 - 1 30 mg/dL    Glucose 79 65 - 140 mg/dL    Glucose, Fasting 79 65 - 99 mg/dL    Calcium 8 0 (L) 8 3 - 10 1 mg/dL    eGFR 90 ml/min/1 73sq m   CBC and differential    Collection Time: 09/21/22  6:46 AM   Result Value Ref Range    WBC 4 47 4 31 - 10 16 Thousand/uL    RBC 5 08 3 88 - 5 62 Million/uL    Hemoglobin 9 7 (L) 12 0 - 17 0 g/dL    Hematocrit 33 6 (L) 36 5 - 49 3 %    MCV 66 (L) 82 - 98 fL    MCH 19 1 (L) 26 8 - 34 3 pg    MCHC 28 9 (L) 31 4 - 37 4 g/dL    RDW 17 3 (H) 11 6 - 15 1 %    MPV 10 1 8 9 - 12 7 fL    Platelets 073 986 - 802 Thousands/uL           Imaging Studies: XR abdomen obstruction series    Result Date: 9/20/2022  Narrative: OBSTRUCTION SERIES INDICATION:   Upper abdominal pain or vomiting  COMPARISON:  9/19/2022 EXAM PERFORMED/VIEWS:  XR ABDOMEN OBSTRUCTION SERIES FINDINGS: Surgical chain sutures again noted in the left hemiabdomen and pelvis related to patient's total proctocolectomy with ileal pouch-anal anastomosis  Persistent gaseous distention of the ileal pouch noted with some mildly dilated air-filled bowel loops in  the mid abdomen  No free air beneath the hemidiaphragms  No pathologic calcifications or soft tissue masses evident  Osseous structures are unremarkable  Examination of the chest reveals a normal cardiomediastinal silhouette  Lungs are clear  Impression: Persistent gaseous distention of the ileal pouch with some mildly dilated air-filled bowel loops in the mid abdomen although overall improved compared to  image from recent CT examination dated 9/19/2022 ? Reactive ileus in the setting of distal ileal pouch inflammation as seen and described on prior CT  Continued follow-up recommended  Workstation performed: [id number]     CT abdomen pelvis with contrast    Result Date: 9/19/2022  Narrative: CT ABDOMEN AND PELVIS WITH IV CONTRAST INDICATION:   Abdominal pain, acute, nonlocalized severe persistent abdominal pain with N/V, patient concerned about obstruction   COMPARISON:  CT abdomen and pelvis 8/21/2022 TECHNIQUE:  CT examination of the abdomen and pelvis was performed  Axial, sagittal, and coronal 2D reformatted images were created from the source data and submitted for interpretation  Radiation dose length product (DLP) for this visit:  493 mGy-cm   This examination, like all CT scans performed in the [facility], was performed utilizing techniques to minimize radiation dose exposure, including the use of iterative reconstruction and automated exposure control  IV Contrast:  85 mL of iohexol (OMNIPAQUE)  350 Enteric Contrast:  Enteric contrast was administered  FINDINGS: ABDOMEN LOWER CHEST:  Minimal enteric contrast in the distal esophagus may be sequela of gastroesophageal reflux  LIVER/BILIARY TREE:  Unremarkable  GALLBLADDER:  No calcified gallstones  No pericholecystic inflammatory change  SPLEEN:  Mild splenomegaly measuring 14 5 cm in transverse diameter  PANCREAS:  Unremarkable  ADRENAL GLANDS:  Unremarkable  KIDNEYS/URETERS:  Unremarkable  No hydronephrosis  STOMACH AND BOWEL:  Post total proctocolectomy with ileal pouch - anal anastomosis  Mild gas fluid distention of the ileal pouch without significant wall thickening  Moderate gaseous distention of distal ileum proximal to the pouch with minimal wall thickening  Normal caliber proximal to mid small bowel  APPENDIX:  Post appendectomy  ABDOMINOPELVIC CAVITY:  No ascites  No pneumoperitoneum  Interval enlargement of few mesenteric lymph nodes; lymph nodes will be measured in short axis on series 2: Image 51, posterior mesentery, 1 1 cm previously 7 mm  Image 54, posterior mesentery, 1 1 cm previously 7 mm  Image 66, right external iliac, 1 6 cm previously 1 2 cm  VESSELS:  Unremarkable for patient's age  PELVIS REPRODUCTIVE ORGANS:  Unremarkable for patient's age  URINARY BLADDER:  Unremarkable  ABDOMINAL WALL/INGUINAL REGIONS:  Subcentimeter ventral umbilical abdominal wall diastases containing fat  No bowel herniation  No inguinal hernia or mass   OSSEOUS STRUCTURES:  No acute fracture or destructive osseous lesion  Small Schmorl's nodes in the lower thoracic spine  Mild stable bilateral sacroiliitis and right pubic sclerosis  Impression: Fluid in the ileal pouch and minimal thickening of distal ileum may represent nonspecific distal ileal-pouchitis in the appropriate clinical context  No perienteric inflammatory changes or abscess  Interval enlargement of few mesenteric lymph nodes and mild new splenomegaly  CT abdomen and pelvis follow-up in 3 months is advised in addition to clinical evaluation  No other significant interval change  This study demonstrates a significant  finding and was documented as such in [facility] for liaison and referring practitioner notification  Workstation performed: [id number]     Flexible Sigmoidoscopy    Addendum Date: 8/25/2022 Addendum:   [facility] Operating Room [address] [place] [address] [phone] DATE OF SERVICE: 8/25/22 PHYSICIAN(S): Attending: Angel Reyes MD Fellow: No Staff Documented INDICATION: Intractable abdominal pain POST-OP DIAGNOSIS: See the impression below  HISTORY: Status post total proctocolectomy with J pouch BOWEL PREPARATION:  PREPROCEDURE: Informed consent was obtained for the procedure, including sedation  Risks including but not limited to bleeding, infection, perforation, adverse drug reaction and aspiration were explained in detail  Also explained about less than 100% sensitivity with the exam and other alternatives  The patient was placed in the left lateral decubitus position  DETAILS OF PROCEDURE: Patient was taken to the procedure room where a time out was performed to confirm correct patient and correct procedure  The patient underwent monitored anesthesia care, which was administered by an anesthesia professional  The patient's blood pressure, heart rate, level of consciousness, oxygen and respirations were monitored throughout the procedure  A digital rectal exam was performed   The adult gastroscope was introduced through the anus and advanced into the terminal ileum to the extent of the scope  Retroflexion was not performed due to altered anatomy  The patient's estimated blood loss was minimal (<5 mL)  The procedure was not difficult  The patient tolerated the procedure well  There were no apparent complications  ANESTHESIA INFORMATION: ASA: II Anesthesia Type: IV Sedation with Anesthesia MEDICATIONS: No administrations occurring from 1205 to 1232 on 08/25/22 FINDINGS: Ulcer  There were a few small focal ulcers at the proximal aspect of the pouch with slight associated mucosal hyperemia  The remainder of the pouch appeared unremarkable  There was no significant narrowing or angulation at the anastomosis  Multiple biopsies were obtained  EVENTS: Procedure Events Event Event Time ENDO SCOPE OUT TIME 8/25/2022 12:29 PM SPECIMENS: ID Type Source Tests Collected by Time Destination 1 : ileal pouch r/o pouchitis  Tissue Terminal Ileum TISSUE EXAM Kj Chung MD 8/25/2022 12:25 PM  EQUIPMENT: Scope not documented IMPRESSION: Mild inflammation of the proximal pouch with several small focal ulcers, but otherwise normal endoscopic appearance  Biopsies obtained RECOMMENDATION: Await pathology results Continue current management Consider MR enterography for further evaluation of the more proximal small bowel  Kj Chung MD     Result Date: 8/25/2022  Narrative: [address] [place] [address] Operating Room [address] [place] [address] [phone] DATE OF SERVICE: 8/25/22 PHYSICIAN(S): Attending: Kj Chung MD Fellow: No Staff Documented INDICATION: Intractable abdominal pain POST-OP DIAGNOSIS: See the impression below  HISTORY: Status post total proctocolectomy with J pouch BOWEL PREPARATION:  PREPROCEDURE: Informed consent was obtained for the procedure, including sedation   Risks including but not limited to bleeding, infection, perforation, adverse drug reaction and aspiration were explained in detail  Also explained about less than 100% sensitivity with the exam and other alternatives  The patient was placed in the left lateral decubitus position  DETAILS OF PROCEDURE: Patient was taken to the procedure room where a time out was performed to confirm correct patient and correct procedure  The patient underwent monitored anesthesia care, which was administered by an anesthesia professional  The patient's blood pressure, heart rate, level of consciousness, oxygen and respirations were monitored throughout the procedure  A digital rectal exam was performed  The adult gastroscope was introduced through the anus and advanced into the terminal ileum to the extent of the scope  Retroflexion was not performed due to altered anatomy  The patient's estimated blood loss was minimal (<5 mL)  The procedure was not difficult  The patient tolerated the procedure well  There were no apparent complications  ANESTHESIA INFORMATION: ASA: II Anesthesia Type: IV Sedation with Anesthesia MEDICATIONS: No administrations occurring from 1205 to 1232 on 08/25/22 FINDINGS: Ulcer  There were a few small focal ulcers at the proximal aspect of the pouch with slight associated mucosal hyperemia  The remainder of the pouch appeared unremarkable  There was no significant narrowing or angulation at the anastomosis  Multiple biopsies were obtained  EVENTS: Procedure Events Event Event Time ENDO SCOPE OUT TIME 8/25/2022 12:29 PM SPECIMENS: ID Type Source Tests Collected by Time Destination 1 : ileal pouch r/o pouchitis  Tissue Terminal Ileum TISSUE EXAM Hang Rhodes MD 8/25/2022 12:25 PM  EQUIPMENT: Scope not documented     Impression: Mild inflammation of the proximal pouch with several small focal ulcers, but otherwise normal endoscopic appearance    Biopsies obtained RECOMMENDATION: Await pathology results Continue current management Consider CT or MR enterography for further evaluation of the more proximal small bowel  Bart Crane Tra Molina MD       Problem List:   Patient Active Problem List   Diagnosis    [facility]    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    Stricture of rectum    CAR (generalized anxiety disorder)    BMI 26 0-26 9,adult    History of ulcerative colitis    Complications of intestinal pouch (HCC)    Left groin pain    Left-sided low back pain without sciatica    Sacroiliac joint dysfunction of right side    Arthralgia    Arm and leg movements, uncontrollable    Seizure-like activity (HCC)    Ankylosing spondylitis (HCC)    Elevated LFTs    Enteritis    Anemia    Polymicrobial after streptococcal bacteremia    Presumed AV endocarditis    Epigastric abdominal pain    Abnormal CT scan of lung    Rectal obstruction    History of Clostridium difficile infection    Partial small bowel obstruction (HCC)    Intractable abdominal pain    Elevated C-reactive protein (CRP)         KY Howard      Please Note: "This note has been constructed using a voice recognition system  Therefore there may be syntax, spelling, and/or grammatical errors   Please call if you have any questions  "**

## 2022-09-21 NOTE — ASSESSMENT & PLAN NOTE
Patient presents with intractable epigastric pain  · History of ulcerative colitis status post total proctocolectomy with J-pouch ileoanal anastomosis, anastomotic stricture and persistent inflammation as well as questionable pouchitis and possible underlying Crohn's disease  · Patient recently discussed with surgeon in [place], plan for surgical reconstruction January 11th  · Xray abdomen shows Persistent gaseous distention of the ileal pouch with some mildly dilated air-filled bowel loops in the mid abdomen although overall improved compared to  image from recent CT examination dated 9/19/2022 ? Reactive ileus in the setting of distal ileal pouch inflammation as seen and described on prior CT    · Required 4mg dilaudid in ED with minimal improvement of pain  · Continue flagyl daily  · Pantoprazole IV BID  · NPO  · IVFs  · GI consult

## 2022-09-22 LAB
ANION GAP SERPL CALCULATED.3IONS-SCNC: 5 MMOL/L (ref 4–13)
BUN SERPL-MCNC: 7 MG/DL (ref 5–25)
CALCIUM SERPL-MCNC: 8.4 MG/DL (ref 8.3–10.1)
CHLORIDE SERPL-SCNC: 103 MMOL/L (ref 96–108)
CO2 SERPL-SCNC: 28 MMOL/L (ref 21–32)
CREAT SERPL-MCNC: 1.02 MG/DL (ref 0.6–1.3)
ERYTHROCYTE [DISTWIDTH] IN BLOOD BY AUTOMATED COUNT: 16.8 % (ref 11.6–15.1)
GFR SERPL CREATININE-BSD FRML MDRD: 98 ML/MIN/1.73SQ M
GLUCOSE SERPL-MCNC: 83 MG/DL (ref 65–140)
HCT VFR BLD AUTO: 34.9 % (ref 36.5–49.3)
HGB BLD-MCNC: 10.4 G/DL (ref 12–17)
MCH RBC QN AUTO: 19 PG (ref 26.8–34.3)
MCHC RBC AUTO-ENTMCNC: 29.8 G/DL (ref 31.4–37.4)
MCV RBC AUTO: 64 FL (ref 82–98)
PLATELET # BLD AUTO: 323 THOUSANDS/UL (ref 149–390)
PMV BLD AUTO: 9 FL (ref 8.9–12.7)
POTASSIUM SERPL-SCNC: 3.9 MMOL/L (ref 3.5–5.3)
RBC # BLD AUTO: 5.46 MILLION/UL (ref 3.88–5.62)
SODIUM SERPL-SCNC: 136 MMOL/L (ref 135–147)
WBC # BLD AUTO: 8.12 THOUSAND/UL (ref 4.31–10.16)

## 2022-09-22 PROCEDURE — 85027 COMPLETE CBC AUTOMATED: CPT | Performed by: FAMILY MEDICINE

## 2022-09-22 PROCEDURE — 99232 SBSQ HOSP IP/OBS MODERATE 35: CPT | Performed by: INTERNAL MEDICINE

## 2022-09-22 PROCEDURE — 99232 SBSQ HOSP IP/OBS MODERATE 35: CPT | Performed by: FAMILY MEDICINE

## 2022-09-22 PROCEDURE — 80048 BASIC METABOLIC PNL TOTAL CA: CPT | Performed by: FAMILY MEDICINE

## 2022-09-22 PROCEDURE — C9113 INJ PANTOPRAZOLE SODIUM, VIA: HCPCS

## 2022-09-22 RX ORDER — METOCLOPRAMIDE HYDROCHLORIDE 5 MG/ML
5 INJECTION INTRAMUSCULAR; INTRAVENOUS ONCE
Status: COMPLETED | OUTPATIENT
Start: 2022-09-22 | End: 2022-09-22

## 2022-09-22 RX ADMIN — HYDROMORPHONE HYDROCHLORIDE 0.5 MG: 1 INJECTION, SOLUTION INTRAMUSCULAR; INTRAVENOUS; SUBCUTANEOUS at 06:37

## 2022-09-22 RX ADMIN — DULOXETINE HYDROCHLORIDE 60 MG: 60 CAPSULE, DELAYED RELEASE ORAL at 09:40

## 2022-09-22 RX ADMIN — ONDANSETRON 4 MG: 2 INJECTION INTRAMUSCULAR; INTRAVENOUS at 09:40

## 2022-09-22 RX ADMIN — SODIUM CHLORIDE 125 ML/HR: 0.9 INJECTION, SOLUTION INTRAVENOUS at 14:51

## 2022-09-22 RX ADMIN — PANTOPRAZOLE SODIUM 40 MG: 40 INJECTION, POWDER, FOR SOLUTION INTRAVENOUS at 09:40

## 2022-09-22 RX ADMIN — ENOXAPARIN SODIUM 40 MG: 40 INJECTION SUBCUTANEOUS at 09:40

## 2022-09-22 RX ADMIN — ONDANSETRON 4 MG: 2 INJECTION INTRAMUSCULAR; INTRAVENOUS at 16:34

## 2022-09-22 RX ADMIN — HYDROMORPHONE HYDROCHLORIDE 0.5 MG: 1 INJECTION, SOLUTION INTRAMUSCULAR; INTRAVENOUS; SUBCUTANEOUS at 20:11

## 2022-09-22 RX ADMIN — HYDROMORPHONE HYDROCHLORIDE 0.5 MG: 1 INJECTION, SOLUTION INTRAMUSCULAR; INTRAVENOUS; SUBCUTANEOUS at 03:29

## 2022-09-22 RX ADMIN — Medication 250 MG: at 17:40

## 2022-09-22 RX ADMIN — HYDROMORPHONE HYDROCHLORIDE 0.5 MG: 1 INJECTION, SOLUTION INTRAMUSCULAR; INTRAVENOUS; SUBCUTANEOUS at 09:40

## 2022-09-22 RX ADMIN — HYDROMORPHONE HYDROCHLORIDE 0.5 MG: 1 INJECTION, SOLUTION INTRAMUSCULAR; INTRAVENOUS; SUBCUTANEOUS at 16:35

## 2022-09-22 RX ADMIN — METOCLOPRAMIDE 5 MG: 5 INJECTION, SOLUTION INTRAMUSCULAR; INTRAVENOUS at 14:50

## 2022-09-22 RX ADMIN — METRONIDAZOLE 500 MG: 500 TABLET ORAL at 06:37

## 2022-09-22 RX ADMIN — HYDROMORPHONE HYDROCHLORIDE 0.5 MG: 1 INJECTION, SOLUTION INTRAMUSCULAR; INTRAVENOUS; SUBCUTANEOUS at 23:24

## 2022-09-22 RX ADMIN — PANTOPRAZOLE SODIUM 40 MG: 40 INJECTION, POWDER, FOR SOLUTION INTRAVENOUS at 20:12

## 2022-09-22 RX ADMIN — ONDANSETRON 4 MG: 2 INJECTION INTRAMUSCULAR; INTRAVENOUS at 23:25

## 2022-09-22 RX ADMIN — METRONIDAZOLE 500 MG: 500 TABLET ORAL at 21:15

## 2022-09-22 RX ADMIN — HYDROMORPHONE HYDROCHLORIDE 0.5 MG: 1 INJECTION, SOLUTION INTRAMUSCULAR; INTRAVENOUS; SUBCUTANEOUS at 12:48

## 2022-09-22 RX ADMIN — SODIUM CHLORIDE 125 ML/HR: 0.9 INJECTION, SOLUTION INTRAVENOUS at 22:35

## 2022-09-22 RX ADMIN — Medication 250 MG: at 09:40

## 2022-09-22 RX ADMIN — METRONIDAZOLE 500 MG: 500 TABLET ORAL at 14:29

## 2022-09-22 NOTE — PROGRESS NOTES
Progress Note - [facility]  Gabe Sierra 32 y o  male MRN: [id number]    Unit/Bed#: [address] [place] [address] Encounter: [id number]      Assessment/Plan:  25-year-old male with past medical history of ulcerative colitis status post total proctocolectomy with J pouch ileal anal anastomosis, anastomotic stricture, persistent inflammation, question of pouchitis who presents to [place] [address] on 09/20 with report of abdominal pain        1  Abdominal pain associated with nausea and vomiting  Patient reported abdominal pain which increases in severity with onset on 9/19 at 2:00 a m  Priscila Arceo Patient also reported associated symptoms of nausea and vomiting  CT abdomen and pelvis with contrast done 9/19 showed fluid in the ileal pouch and minimal thickening of the distal ileum may represent nonspecific distal ileal-pouch itis in the appropriate clinical content  No perienteric inflammatory changes or abscess  X-ray abdominal obstructive series done 9/20 showed persistent gaseous distention of the ileum out with some mildly dilated air-filled bowel loops in the mid abdomen all through overall improved compared to  image from recent to CT exam dated 09/19/2022  Questionable reactive ileus in the setting of distal ileal pouch inflammation as seen and described on prior CT  Continues with generalized abdominal pain and tenderness with palpation  Tolerating sips of clear liquids  Continues with intermittent nausea but no vomiting    -Clear liquid diet  -Continue supportive care and IV hydration  -Pain management per attending  -Continue antiemetics as needed for nausea or vomiting   -Continue Flagyl  -Continue pantoprazole b i d   -Patient is having a revision of J pouch done by surgeon in [place] on January 11, 2023  Subjective:   Lying in bed in no acute distress  Tolerating sips of clear liquids  Continues with intermittent nausea  No  vomiting    Continues to have generalized abdominal pain and tenderness throughout with palpation  Patient moving his bowels approximately 2 to 3 times a day  Denies any blood in stool  Denies any increase in bowel movements  Objective:     Vitals: Blood pressure 121/77, pulse 71, temperature 98 °F (36 7 °C), temperature source Oral, resp  rate 17, height 5' 9" (1 753 m), weight 82 6 kg (182 lb), SpO2 95 %  ,Body mass index is 26 88 kg/m²  Intake/Output Summary (Last 24 hours) at 9/22/2022 1052  Last data filed at 9/22/2022 0301  Gross per 24 hour   Intake 480 ml   Output 2300 ml   Net -1820 ml       Physical Exam: Physical Exam  Vitals and nursing note reviewed  Constitutional:       General: He is not in acute distress  Cardiovascular:      Rate and Rhythm: Normal rate and regular rhythm  Pulses: Normal pulses  Heart sounds: Normal heart sounds  Pulmonary:      Effort: Pulmonary effort is normal  No respiratory distress  Breath sounds: Normal breath sounds  No stridor  No wheezing, rhonchi or rales  Abdominal:      General: Bowel sounds are normal  There is no distension  Palpations: Abdomen is soft  There is no mass  Tenderness: There is abdominal tenderness  There is no guarding or rebound  Hernia: No hernia is present  Comments: Generalized tenderness throughout with palpation  Musculoskeletal:      Right lower leg: No edema  Skin:     General: Skin is warm and dry  Capillary Refill: Capillary refill takes less than 2 seconds  Coloration: Skin is not jaundiced or pale  Neurological:      Mental Status: He is alert and oriented to person, place, and time     Psychiatric:         Mood and Affect: Mood normal          Invasive Devices  Report    Peripheral Intravenous Line  Duration           Peripheral IV 09/20/22 Right Antecubital 1 day                Current Facility-Administered Medications:     acetaminophen (TYLENOL) tablet 650 mg, 650 mg, Oral, Q6H PRN, Pat Zhu PA-C    DULoxetine (CYMBALTA) delayed release capsule 60 mg, 60 mg, Oral, Daily, Emmanuelle Vecellio, PA-C, 60 mg at 09/22/22 0940    enoxaparin (LOVENOX) subcutaneous injection 40 mg, 40 mg, Subcutaneous, Daily, Emmanuelle Vecellio, PA-C, 40 mg at 09/22/22 0940    HYDROmorphone (DILAUDID) injection 0 5 mg, 0 5 mg, Intravenous, Q3H PRN, Emmanuelle Vecellio, PA-C, 0 5 mg at 09/22/22 0940    metroNIDAZOLE (FLAGYL) tablet 500 mg, 500 mg, Oral, Q8H [place], Emmanuelle Vecellio, PA-C, 500 mg at 09/22/22 7716    ondansetron (ZOFRAN) injection 4 mg, 4 mg, Intravenous, Q6H PRN, Emmanuelle Vecellio, PA-C, 4 mg at 09/22/22 0940    pantoprazole (PROTONIX) injection 40 mg, 40 mg, Intravenous, Q12H [address], Emmanuelle Vecellio, PA-C, 40 mg at 09/22/22 0940    saccharomyces boulardii (FLORASTOR) capsule 250 mg, 250 mg, Oral, BID, Emmanuelle Vecellio, PA-C, 250 mg at 09/22/22 0940    sodium chloride 0 9 % infusion, 125 mL/hr, Intravenous, Continuous, Emmanuelle Vecellio, PA-C, Last Rate: 125 mL/hr at 09/21/22 1407, 125 mL/hr at 09/21/22 1407    Lab Results:   Recent Results (from the past 24 hour(s))   Basic metabolic panel    Collection Time: 09/22/22  6:36 AM   Result Value Ref Range    Sodium 136 135 - 147 mmol/L    Potassium 3 9 3 5 - 5 3 mmol/L    Chloride 103 96 - 108 mmol/L    CO2 28 21 - 32 mmol/L    ANION GAP 5 4 - 13 mmol/L    BUN 7 5 - 25 mg/dL    Creatinine 1 02 0 60 - 1 30 mg/dL    Glucose 83 65 - 140 mg/dL    Calcium 8 4 8 3 - 10 1 mg/dL    eGFR 98 ml/min/1 73sq m   CBC    Collection Time: 09/22/22  6:36 AM   Result Value Ref Range    WBC 8 12 4 31 - 10 16 Thousand/uL    RBC 5 46 3 88 - 5 62 Million/uL    Hemoglobin 10 4 (L) 12 0 - 17 0 g/dL    Hematocrit 34 9 (L) 36 5 - 49 3 %    MCV 64 (L) 82 - 98 fL    MCH 19 0 (L) 26 8 - 34 3 pg    MCHC 29 8 (L) 31 4 - 37 4 g/dL    RDW 16 8 (H) 11 6 - 15 1 %    Platelets 662 816 - 692 Thousands/uL    MPV 9 0 8 9 - 12 7 fL             Imaging Studies: XR abdomen obstruction series    Result Date: 9/20/2022  Narrative: OBSTRUCTION SERIES INDICATION:   Upper abdominal pain or vomiting  COMPARISON:  9/19/2022 EXAM PERFORMED/VIEWS:  XR ABDOMEN OBSTRUCTION SERIES FINDINGS: Surgical chain sutures again noted in the left hemiabdomen and pelvis related to patient's total proctocolectomy with ileal pouch-anal anastomosis  Persistent gaseous distention of the ileal pouch noted with some mildly dilated air-filled bowel loops in  the mid abdomen  No free air beneath the hemidiaphragms  No pathologic calcifications or soft tissue masses evident  Osseous structures are unremarkable  Examination of the chest reveals a normal cardiomediastinal silhouette  Lungs are clear  Impression: Persistent gaseous distention of the ileal pouch with some mildly dilated air-filled bowel loops in the mid abdomen although overall improved compared to  image from recent CT examination dated 9/19/2022 ? Reactive ileus in the setting of distal ileal pouch inflammation as seen and described on prior CT  Continued follow-up recommended  Workstation performed: [id number]     CT abdomen pelvis with contrast    Result Date: 9/19/2022  Narrative: CT ABDOMEN AND PELVIS WITH IV CONTRAST INDICATION:   Abdominal pain, acute, nonlocalized severe persistent abdominal pain with N/V, patient concerned about obstruction  COMPARISON:  CT abdomen and pelvis 8/21/2022 TECHNIQUE:  CT examination of the abdomen and pelvis was performed  Axial, sagittal, and coronal 2D reformatted images were created from the source data and submitted for interpretation  Radiation dose length product (DLP) for this visit:  493 mGy-cm   This examination, like all CT scans performed in the [facility], was performed utilizing techniques to minimize radiation dose exposure, including the use of iterative reconstruction and automated exposure control  IV Contrast:  85 mL of iohexol (OMNIPAQUE)  350 Enteric Contrast:  Enteric contrast was administered   FINDINGS: ABDOMEN LOWER CHEST:  Minimal enteric contrast in the distal esophagus may be sequela of gastroesophageal reflux  LIVER/BILIARY TREE:  Unremarkable  GALLBLADDER:  No calcified gallstones  No pericholecystic inflammatory change  SPLEEN:  Mild splenomegaly measuring 14 5 cm in transverse diameter  PANCREAS:  Unremarkable  ADRENAL GLANDS:  Unremarkable  KIDNEYS/URETERS:  Unremarkable  No hydronephrosis  STOMACH AND BOWEL:  Post total proctocolectomy with ileal pouch - anal anastomosis  Mild gas fluid distention of the ileal pouch without significant wall thickening  Moderate gaseous distention of distal ileum proximal to the pouch with minimal wall thickening  Normal caliber proximal to mid small bowel  APPENDIX:  Post appendectomy  ABDOMINOPELVIC CAVITY:  No ascites  No pneumoperitoneum  Interval enlargement of few mesenteric lymph nodes; lymph nodes will be measured in short axis on series 2: Image 51, posterior mesentery, 1 1 cm previously 7 mm  Image 54, posterior mesentery, 1 1 cm previously 7 mm  Image 66, right external iliac, 1 6 cm previously 1 2 cm  VESSELS:  Unremarkable for patient's age  PELVIS REPRODUCTIVE ORGANS:  Unremarkable for patient's age  URINARY BLADDER:  Unremarkable  ABDOMINAL WALL/INGUINAL REGIONS:  Subcentimeter ventral umbilical abdominal wall diastases containing fat  No bowel herniation  No inguinal hernia or mass  OSSEOUS STRUCTURES:  No acute fracture or destructive osseous lesion  Small Schmorl's nodes in the lower thoracic spine  Mild stable bilateral sacroiliitis and right pubic sclerosis  Impression: Fluid in the ileal pouch and minimal thickening of distal ileum may represent nonspecific distal ileal-pouchitis in the appropriate clinical context  No perienteric inflammatory changes or abscess  Interval enlargement of few mesenteric lymph nodes and mild new splenomegaly  CT abdomen and pelvis follow-up in 3 months is advised in addition to clinical evaluation  No other significant interval change   This study demonstrates a significant  finding and was documented as such in [facility] for liaison and referring practitioner notification  Workstation performed: [id number]     Flexible Sigmoidoscopy    Addendum Date: 8/25/2022 Addendum:   [address] [facility] Operating Room 9 [facility] [phone] DATE OF SERVICE: 8/25/22 PHYSICIAN(S): Attending: Bridget Ramsey MD Fellow: No Staff Documented INDICATION: Intractable abdominal pain POST-OP DIAGNOSIS: See the impression below  HISTORY: Status post total proctocolectomy with J pouch BOWEL PREPARATION:  PREPROCEDURE: Informed consent was obtained for the procedure, including sedation  Risks including but not limited to bleeding, infection, perforation, adverse drug reaction and aspiration were explained in detail  Also explained about less than 100% sensitivity with the exam and other alternatives  The patient was placed in the left lateral decubitus position  DETAILS OF PROCEDURE: Patient was taken to the procedure room where a time out was performed to confirm correct patient and correct procedure  The patient underwent monitored anesthesia care, which was administered by an anesthesia professional  The patient's blood pressure, heart rate, level of consciousness, oxygen and respirations were monitored throughout the procedure  A digital rectal exam was performed  The adult gastroscope was introduced through the anus and advanced into the terminal ileum to the extent of the scope  Retroflexion was not performed due to altered anatomy  The patient's estimated blood loss was minimal (<5 mL)  The procedure was not difficult  The patient tolerated the procedure well  There were no apparent complications  ANESTHESIA INFORMATION: ASA: II Anesthesia Type: IV Sedation with Anesthesia MEDICATIONS: No administrations occurring from 1205 to 1232 on 08/25/22 FINDINGS: Ulcer   There were a few small focal ulcers at the proximal aspect of the pouch with slight associated mucosal hyperemia  The remainder of the pouch appeared unremarkable  There was no significant narrowing or angulation at the anastomosis  Multiple biopsies were obtained  EVENTS: Procedure Events Event Event Time ENDO SCOPE OUT TIME 8/25/2022 12:29 PM SPECIMENS: ID Type Source Tests Collected by Time Destination 1 : ileal pouch r/o pouchitis  Tissue Terminal Ileum TISSUE EXAM Anne Mills MD 8/25/2022 12:25 PM  EQUIPMENT: Scope not documented IMPRESSION: Mild inflammation of the proximal pouch with several small focal ulcers, but otherwise normal endoscopic appearance  Biopsies obtained RECOMMENDATION: Await pathology results Continue current management Consider MR enterography for further evaluation of the more proximal small bowel  Anne Mills MD     Result Date: 8/25/2022  Narrative: [address] [facility] Operating Room [address] [place] [address] [phone] DATE OF SERVICE: 8/25/22 PHYSICIAN(S): Attending: Anne Mills MD Fellow: No Staff Documented INDICATION: Intractable abdominal pain POST-OP DIAGNOSIS: See the impression below  HISTORY: Status post total proctocolectomy with J pouch BOWEL PREPARATION:  PREPROCEDURE: Informed consent was obtained for the procedure, including sedation  Risks including but not limited to bleeding, infection, perforation, adverse drug reaction and aspiration were explained in detail  Also explained about less than 100% sensitivity with the exam and other alternatives  The patient was placed in the left lateral decubitus position  DETAILS OF PROCEDURE: Patient was taken to the procedure room where a time out was performed to confirm correct patient and correct procedure  The patient underwent monitored anesthesia care, which was administered by an anesthesia professional  The patient's blood pressure, heart rate, level of consciousness, oxygen and respirations were monitored throughout the procedure   A digital rectal exam was performed  The adult gastroscope was introduced through the anus and advanced into the terminal ileum to the extent of the scope  Retroflexion was not performed due to altered anatomy  The patient's estimated blood loss was minimal (<5 mL)  The procedure was not difficult  The patient tolerated the procedure well  There were no apparent complications  ANESTHESIA INFORMATION: ASA: II Anesthesia Type: IV Sedation with Anesthesia MEDICATIONS: No administrations occurring from 1205 to 1232 on 08/25/22 FINDINGS: Ulcer  There were a few small focal ulcers at the proximal aspect of the pouch with slight associated mucosal hyperemia  The remainder of the pouch appeared unremarkable  There was no significant narrowing or angulation at the anastomosis  Multiple biopsies were obtained  EVENTS: Procedure Events Event Event Time ENDO SCOPE OUT TIME 8/25/2022 12:29 PM SPECIMENS: ID Type Source Tests Collected by Time Destination 1 : ileal pouch r/o pouchitis  Tissue Terminal Ileum TISSUE EXAM Kavon Madrigal MD 8/25/2022 12:25 PM  EQUIPMENT: Scope not documented     Impression: Mild inflammation of the proximal pouch with several small focal ulcers, but otherwise normal endoscopic appearance  Biopsies obtained RECOMMENDATION: Await pathology results Continue current management Consider CT or MR enterography for further evaluation of the more proximal small bowel  MD Maya Rodriguez CRNP      Please Note: "This note has been constructed using a voice recognition system  Therefore there may be syntax, spelling, and/or grammatical errors   Please call if you have any questions  "**

## 2022-09-22 NOTE — PLAN OF CARE
Problem: Nutrition/Hydration-ADULT  Goal: Nutrient/Hydration intake appropriate for improving, restoring or maintaining nutritional needs  Description: Monitor and assess patient's nutrition/hydration status for malnutrition  Collaborate with interdisciplinary team and initiate plan and interventions as ordered  Monitor patient's weight and dietary intake as ordered or per policy  Utilize nutrition screening tool and intervene as necessary  Determine patient's food preferences and provide high-protein, high-caloric foods as appropriate       INTERVENTIONS:  - Monitor oral intake, urinary output, labs, and treatment plans  - Assess nutrition and hydration status and recommend course of action  - Evaluate amount of meals eaten  - Assist patient with eating if necessary   - Allow adequate time for meals  - Recommend/ encourage appropriate diets, oral nutritional supplements, and vitamin/mineral supplements  - Order, calculate, and assess calorie counts as needed  - Recommend, monitor, and adjust tube feedings and TPN/PPN based on assessed needs  - Assess need for intravenous fluids  - Provide specific nutrition/hydration education as appropriate  - Include patient/family/caregiver in decisions related to nutrition  Outcome: Progressing     Problem: PAIN - ADULT  Goal: Verbalizes/displays adequate comfort level or baseline comfort level  Description: Interventions:  - Encourage patient to monitor pain and request assistance  - Assess pain using appropriate pain scale  - Administer analgesics based on type and severity of pain and evaluate response  - Implement non-pharmacological measures as appropriate and evaluate response  - Consider cultural and social influences on pain and pain management  - Notify physician/advanced practitioner if interventions unsuccessful or patient reports new pain  Outcome: Progressing     Problem: INFECTION - ADULT  Goal: Absence or prevention of progression during hospitalization  Description: INTERVENTIONS:  - Assess and monitor for signs and symptoms of infection  - Monitor lab/diagnostic results  - Monitor all insertion sites, i e  indwelling lines, tubes, and drains  - Monitor endotracheal if appropriate and nasal secretions for changes in amount and color  - [place] appropriate cooling/warming therapies per order  - Administer medications as ordered  - Instruct and encourage patient and family to use good hand hygiene technique  - Identify and instruct in appropriate isolation precautions for identified infection/condition  Outcome: Progressing     Problem: DISCHARGE PLANNING  Goal: Discharge to home or other facility with appropriate resources  Description: INTERVENTIONS:  - Identify barriers to discharge w/patient and caregiver  - Arrange for needed discharge resources and transportation as appropriate  - Identify discharge learning needs (meds, wound care, etc )  - Arrange for interpretive services to assist at discharge as needed  - Refer to Case Management Department for coordinating discharge planning if the patient needs post-hospital services based on physician/advanced practitioner order or complex needs related to functional status, cognitive ability, or social support system  Outcome: Progressing     Problem: Knowledge Deficit  Goal: Patient/family/caregiver demonstrates understanding of disease process, treatment plan, medications, and discharge instructions  Description: Complete learning assessment and assess knowledge base    Interventions:  - Provide teaching at level of understanding  - Provide teaching via preferred learning methods  Outcome: Progressing     Problem: GASTROINTESTINAL - ADULT  Goal: Minimal or absence of nausea and/or vomiting  Description: INTERVENTIONS:  - Administer IV fluids if ordered to ensure adequate hydration  - Maintain NPO status until nausea and vomiting are resolved  - Nasogastric tube if ordered  - Administer ordered antiemetic medications as needed  - Provide nonpharmacologic comfort measures as appropriate  - Advance diet as tolerated, if ordered  - Consider nutrition services referral to assist patient with adequate nutrition and appropriate food choices  Outcome: Progressing  Goal: Maintains or returns to baseline bowel function  Description: INTERVENTIONS:  - Assess bowel function  - Encourage oral fluids to ensure adequate hydration  - Administer IV fluids if ordered to ensure adequate hydration  - Administer ordered medications as needed  - Encourage mobilization and activity  - Consider nutritional services referral to assist patient with adequate nutrition and appropriate food choices  Outcome: Progressing  Goal: Maintains adequate nutritional intake  Description: INTERVENTIONS:  - Monitor percentage of each meal consumed  - Identify factors contributing to decreased intake, treat as appropriate  - Assist with meals as needed  - Monitor I&O, weight, and lab values if indicated  - Obtain nutrition services referral as needed  Outcome: Progressing

## 2022-09-22 NOTE — PLAN OF CARE
Problem: Nutrition/Hydration-ADULT  Goal: Nutrient/Hydration intake appropriate for improving, restoring or maintaining nutritional needs  Description: Monitor and assess patient's nutrition/hydration status for malnutrition  Collaborate with interdisciplinary team and initiate plan and interventions as ordered  Monitor patient's weight and dietary intake as ordered or per policy  Utilize nutrition screening tool and intervene as necessary  Determine patient's food preferences and provide high-protein, high-caloric foods as appropriate       INTERVENTIONS:  - Monitor oral intake, urinary output, labs, and treatment plans  - Assess nutrition and hydration status and recommend course of action  - Evaluate amount of meals eaten  - Assist patient with eating if necessary   - Allow adequate time for meals  - Recommend/ encourage appropriate diets, oral nutritional supplements, and vitamin/mineral supplements  - Order, calculate, and assess calorie counts as needed  - Recommend, monitor, and adjust tube feedings and TPN/PPN based on assessed needs  - Assess need for intravenous fluids  - Provide specific nutrition/hydration education as appropriate  - Include patient/family/caregiver in decisions related to nutrition  Outcome: Progressing     Problem: PAIN - ADULT  Goal: Verbalizes/displays adequate comfort level or baseline comfort level  Description: Interventions:  - Encourage patient to monitor pain and request assistance  - Assess pain using appropriate pain scale  - Administer analgesics based on type and severity of pain and evaluate response  - Implement non-pharmacological measures as appropriate and evaluate response  - Consider cultural and social influences on pain and pain management  - Notify physician/advanced practitioner if interventions unsuccessful or patient reports new pain  Outcome: Progressing     Problem: INFECTION - ADULT  Goal: Absence or prevention of progression during hospitalization  Description: INTERVENTIONS:  - Assess and monitor for signs and symptoms of infection  - Monitor lab/diagnostic results  - Monitor all insertion sites, i e  indwelling lines, tubes, and drains  - Monitor endotracheal if appropriate and nasal secretions for changes in amount and color  - [place] appropriate cooling/warming therapies per order  - Administer medications as ordered  - Instruct and encourage patient and family to use good hand hygiene technique  - Identify and instruct in appropriate isolation precautions for identified infection/condition  Outcome: Progressing

## 2022-09-23 ENCOUNTER — APPOINTMENT (INPATIENT)
Dept: RADIOLOGY | Facility: HOSPITAL | Age: 27
DRG: 386 | End: 2022-09-23
Payer: COMMERCIAL

## 2022-09-23 PROCEDURE — C9113 INJ PANTOPRAZOLE SODIUM, VIA: HCPCS

## 2022-09-23 PROCEDURE — 99232 SBSQ HOSP IP/OBS MODERATE 35: CPT | Performed by: FAMILY MEDICINE

## 2022-09-23 PROCEDURE — 74177 CT ABD & PELVIS W/CONTRAST: CPT

## 2022-09-23 PROCEDURE — 99232 SBSQ HOSP IP/OBS MODERATE 35: CPT | Performed by: NURSE PRACTITIONER

## 2022-09-23 PROCEDURE — G1004 CDSM NDSC: HCPCS

## 2022-09-23 RX ORDER — METHYLPREDNISOLONE SODIUM SUCCINATE 125 MG/2ML
60 INJECTION, POWDER, LYOPHILIZED, FOR SOLUTION INTRAMUSCULAR; INTRAVENOUS ONCE
Status: COMPLETED | OUTPATIENT
Start: 2022-09-23 | End: 2022-09-23

## 2022-09-23 RX ADMIN — HYDROMORPHONE HYDROCHLORIDE 0.5 MG: 1 INJECTION, SOLUTION INTRAMUSCULAR; INTRAVENOUS; SUBCUTANEOUS at 19:01

## 2022-09-23 RX ADMIN — HYDROMORPHONE HYDROCHLORIDE 0.5 MG: 1 INJECTION, SOLUTION INTRAMUSCULAR; INTRAVENOUS; SUBCUTANEOUS at 06:54

## 2022-09-23 RX ADMIN — PANTOPRAZOLE SODIUM 40 MG: 40 INJECTION, POWDER, FOR SOLUTION INTRAVENOUS at 08:45

## 2022-09-23 RX ADMIN — HYDROMORPHONE HYDROCHLORIDE 0.5 MG: 1 INJECTION, SOLUTION INTRAMUSCULAR; INTRAVENOUS; SUBCUTANEOUS at 10:41

## 2022-09-23 RX ADMIN — ONDANSETRON 4 MG: 2 INJECTION INTRAMUSCULAR; INTRAVENOUS at 06:54

## 2022-09-23 RX ADMIN — DULOXETINE HYDROCHLORIDE 60 MG: 60 CAPSULE, DELAYED RELEASE ORAL at 08:45

## 2022-09-23 RX ADMIN — ENOXAPARIN SODIUM 40 MG: 40 INJECTION SUBCUTANEOUS at 08:45

## 2022-09-23 RX ADMIN — HYDROMORPHONE HYDROCHLORIDE 0.5 MG: 1 INJECTION, SOLUTION INTRAMUSCULAR; INTRAVENOUS; SUBCUTANEOUS at 02:35

## 2022-09-23 RX ADMIN — PANTOPRAZOLE SODIUM 40 MG: 40 INJECTION, POWDER, FOR SOLUTION INTRAVENOUS at 21:17

## 2022-09-23 RX ADMIN — SODIUM CHLORIDE 125 ML/HR: 0.9 INJECTION, SOLUTION INTRAVENOUS at 06:54

## 2022-09-23 RX ADMIN — HYDROMORPHONE HYDROCHLORIDE 0.5 MG: 1 INJECTION, SOLUTION INTRAMUSCULAR; INTRAVENOUS; SUBCUTANEOUS at 13:52

## 2022-09-23 RX ADMIN — METHYLPREDNISOLONE SODIUM SUCCINATE 60 MG: 125 INJECTION, POWDER, FOR SOLUTION INTRAMUSCULAR; INTRAVENOUS at 16:06

## 2022-09-23 RX ADMIN — IOHEXOL 100 ML: 350 INJECTION, SOLUTION INTRAVENOUS at 13:42

## 2022-09-23 RX ADMIN — Medication 250 MG: at 17:28

## 2022-09-23 RX ADMIN — ONDANSETRON 4 MG: 2 INJECTION INTRAMUSCULAR; INTRAVENOUS at 13:52

## 2022-09-23 RX ADMIN — Medication 250 MG: at 08:45

## 2022-09-23 RX ADMIN — METRONIDAZOLE 500 MG: 500 TABLET ORAL at 13:52

## 2022-09-23 RX ADMIN — SODIUM CHLORIDE 125 ML/HR: 0.9 INJECTION, SOLUTION INTRAVENOUS at 22:36

## 2022-09-23 RX ADMIN — METRONIDAZOLE 500 MG: 500 TABLET ORAL at 21:17

## 2022-09-23 RX ADMIN — METRONIDAZOLE 500 MG: 500 TABLET ORAL at 06:46

## 2022-09-23 RX ADMIN — HYDROMORPHONE HYDROCHLORIDE 0.5 MG: 1 INJECTION, SOLUTION INTRAMUSCULAR; INTRAVENOUS; SUBCUTANEOUS at 22:30

## 2022-09-23 NOTE — PLAN OF CARE
Problem: Nutrition/Hydration-ADULT  Goal: Nutrient/Hydration intake appropriate for improving, restoring or maintaining nutritional needs  Description: Monitor and assess patient's nutrition/hydration status for malnutrition  Collaborate with interdisciplinary team and initiate plan and interventions as ordered  Monitor patient's weight and dietary intake as ordered or per policy  Utilize nutrition screening tool and intervene as necessary  Determine patient's food preferences and provide high-protein, high-caloric foods as appropriate       INTERVENTIONS:  - Monitor oral intake, urinary output, labs, and treatment plans  - Assess nutrition and hydration status and recommend course of action  - Evaluate amount of meals eaten  - Assist patient with eating if necessary   - Allow adequate time for meals  - Recommend/ encourage appropriate diets, oral nutritional supplements, and vitamin/mineral supplements  - Order, calculate, and assess calorie counts as needed  - Recommend, monitor, and adjust tube feedings and TPN/PPN based on assessed needs  - Assess need for intravenous fluids  - Provide specific nutrition/hydration education as appropriate  - Include patient/family/caregiver in decisions related to nutrition  Outcome: Progressing     Problem: PAIN - ADULT  Goal: Verbalizes/displays adequate comfort level or baseline comfort level  Description: Interventions:  - Encourage patient to monitor pain and request assistance  - Assess pain using appropriate pain scale  - Administer analgesics based on type and severity of pain and evaluate response  - Implement non-pharmacological measures as appropriate and evaluate response  - Consider cultural and social influences on pain and pain management  - Notify physician/advanced practitioner if interventions unsuccessful or patient reports new pain  Outcome: Progressing     Problem: INFECTION - ADULT  Goal: Absence or prevention of progression during hospitalization  Description: INTERVENTIONS:  - Assess and monitor for signs and symptoms of infection  - Monitor lab/diagnostic results  - Monitor all insertion sites, i e  indwelling lines, tubes, and drains  - Monitor endotracheal if appropriate and nasal secretions for changes in amount and color  - [place] appropriate cooling/warming therapies per order  - Administer medications as ordered  - Instruct and encourage patient and family to use good hand hygiene technique  - Identify and instruct in appropriate isolation precautions for identified infection/condition  Outcome: Progressing     Problem: DISCHARGE PLANNING  Goal: Discharge to home or other facility with appropriate resources  Description: INTERVENTIONS:  - Identify barriers to discharge w/patient and caregiver  - Arrange for needed discharge resources and transportation as appropriate  - Identify discharge learning needs (meds, wound care, etc )  - Arrange for interpretive services to assist at discharge as needed  - Refer to Case Management Department for coordinating discharge planning if the patient needs post-hospital services based on physician/advanced practitioner order or complex needs related to functional status, cognitive ability, or social support system  Outcome: Progressing

## 2022-09-23 NOTE — PROGRESS NOTES
[facility] 43  Progress Note - Jaxson Hubbard 1995, 32 y o  male MRN: [id number]  Unit/Bed#: [address] [place] [address] Encounter: [id number]  Primary Care Provider: Saurabh Leung DO   Date and time admitted to hospital: 9/20/2022  4:39 PM    * Intractable abdominal pain  Assessment & Plan  Patient presents with intractable epigastric pain  · History of ulcerative colitis status post total proctocolectomy with J-pouch ileoanal anastomosis, anastomotic stricture and persistent inflammation as well as questionable pouchitis and possible underlying Crohn's disease  · Patient states plan for surgical reconstruction January 11th  · Xray abdomen 9/20 - Persistent gaseous distention of the ileal pouch with some mildly dilated air-filled bowel loops in the mid abdomen although overall improved compared to  image from recent CT examination dated 9/19/2022 ? Reactive ileus in the setting of distal ileal pouch inflammation as seen and described on prior CT   · CT scan today showed acute segmental inflammation of small bowel segment proximal to the rectal pouch  · 60 mg of IV Solu-Medrol x1 ordered by GI  · Required 4mg dilaudid in ED with minimal improvement of pain  · Continue flagyl daily  · Continue Pantoprazole IV BID  · Initially NPO, advanced to clears which he is tolerating  · IVFs  · GI input appreciated    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[facility] [address] )  Assessment & Plan  Pt admitted last month with similar complaints  · Continue flagyl  · CT scan and IV steroid x1 as noted above  · Appreciate recommendations from GI    History of Clostridium difficile infection  Assessment & Plan  History of c diff  · No diarrhea    Ankylosing spondylitis ([facility] [address] )  Assessment & Plan  Not currently on meds    CAR (generalized anxiety disorder)  Assessment & Plan  Continue Cymbalta       VTE Pharmacologic Prophylaxis:   Lovenox    Patient Centered Rounds: I performed bedside rounds with nursing staff today    Discussions with Specialists or Other Care Team Provider: yes - GI    Education and Discussions with Family / Patient: yes    Time Spent for Care: 20 minutes  More than 50% of total time spent on counseling and coordination of care as described above  Current Length of Stay: 2 day(s)  Current Patient Status: Inpatient   Certification Statement: The patient will continue to require additional inpatient hospital stay due to Abdominal pain requiring slow advancement of diet, IV Solu-Medrol  Discharge Plan: Anticipate discharge in 48-72 hrs to home  Code Status: Level 1 - Full Code    Subjective:     Reports nausea but no vomiting  States pain is now mostly in the lower abdomen    Objective:     Vitals:   Temp (24hrs), Av 9 °F (36 6 °C), Min:97 6 °F (36 4 °C), Max:98 2 °F (36 8 °C)    Temp:  [97 6 °F (36 4 °C)-98 2 °F (36 8 °C)] 97 9 °F (36 6 °C)  HR:  [68-77] 71  Resp:  [18-20] 18  BP: (121-128)/(81-93) 125/83  SpO2:  [92 %-100 %] 95 %  Body mass index is 26 88 kg/m²  Input and Output Summary (last 24 hours): Intake/Output Summary (Last 24 hours) at 2022  Last data filed at 2022  Gross per 24 hour   Intake 4218 ml   Output 1700 ml   Net 2518 ml       Physical Exam:   Physical Exam  Vitals reviewed  Constitutional:       General: He is not in acute distress  Appearance: He is not ill-appearing  HENT:      Head: Normocephalic and atraumatic  Eyes:      General:         Right eye: No discharge  Left eye: No discharge  Extraocular Movements: Extraocular movements intact  Cardiovascular:      Rate and Rhythm: Normal rate and regular rhythm  Pulmonary:      Effort: Pulmonary effort is normal  No respiratory distress  Breath sounds: Normal breath sounds  No wheezing or rales  Abdominal:      General: Bowel sounds are normal  There is no distension  Palpations: Abdomen is soft  Tenderness: There is abdominal tenderness (lower abdomen)  There is no guarding     Neurological: Mental Status: He is alert and oriented to person, place, and time  Additional Data:     Labs:  Results from last 7 days   Lab Units 09/22/22  0636 09/21/22  0646 09/20/22  1707   WBC Thousand/uL 8 12   < > 9 46   HEMOGLOBIN g/dL 10 4*   < > 10 8*   HEMATOCRIT % 34 9*   < > 36 6   PLATELETS Thousands/uL 323   < > 364   NEUTROS PCT %  --   --  65   LYMPHS PCT %  --   --  20   MONOS PCT %  --   --  12   EOS PCT %  --   --  2    < > = values in this interval not displayed  Results from last 7 days   Lab Units 09/22/22  0636 09/21/22  0646 09/20/22  1707   SODIUM mmol/L 136   < > 138   POTASSIUM mmol/L 3 9   < > 3 4*   CHLORIDE mmol/L 103   < > 102   CO2 mmol/L 28   < > 29   BUN mg/dL 7   < > 10   CREATININE mg/dL 1 02   < > 1 21   ANION GAP mmol/L 5   < > 7   CALCIUM mg/dL 8 4   < > 8 8   ALBUMIN g/dL  --   --  3 6   TOTAL BILIRUBIN mg/dL  --   --  0 70   ALK PHOS U/L  --   --  91   ALT U/L  --   --  36   AST U/L  --   --  22   GLUCOSE RANDOM mg/dL 83   < > 106    < > = values in this interval not displayed                         Lines/Drains:  Invasive Devices  Report    Peripheral Intravenous Line  Duration           Peripheral IV 09/20/22 Right Antecubital 3 days              Imaging: Reviewed radiology reports from this admission including: abdominal/pelvic CT    Recent Cultures (last 7 days):         Last 24 Hours Medication List:   Current Facility-Administered Medications   Medication Dose Route Frequency Provider Last Rate    acetaminophen  650 mg Oral Q6H PRN Garrett Ghosh PA-C      DULoxetine  60 mg Oral Daily Emmanuelle Vecdamariso PA-CONCHITA      enoxaparin  40 mg Subcutaneous Daily Emmanuelle VecMEENU rios-CONCHITA      HYDROmorphone  0 5 mg Intravenous Q3H PRN Emmanuelle Vecblanca, PA-C      iohexol  50 mL Oral Once in imaging [place] [facility]      metroNIDAZOLE  500 mg Oral Q8H [address] Emmanuelle Vecblanca PA-C      ondansetron  4 mg Intravenous Q6H PRN Emmanuelle Vecdamariso PA-C      pantoprazole  40 mg Intravenous Q12H [address] Emmanuelle EMENU Dodge-CONCHITA      saccharomyces boulardii  250 mg Oral BID Donzell Raisin, PA-C      sodium chloride  125 mL/hr Intravenous Continuous Donzell Raisin, PA-C 125 mL/hr (09/23/22 0654)        Today, Patient Was Seen By: Felecia Gallagher DO    **Please Note: This note may have been constructed using a voice recognition system  **

## 2022-09-23 NOTE — ASSESSMENT & PLAN NOTE
Patient presents with intractable epigastric pain  · History of ulcerative colitis status post total proctocolectomy with J-pouch ileoanal anastomosis, anastomotic stricture and persistent inflammation as well as questionable pouchitis and possible underlying Crohn's disease  · Patient recently discussed with surgeon in [place], plan for surgical reconstruction January 11th  · Xray abdomen shows Persistent gaseous distention of the ileal pouch with some mildly dilated air-filled bowel loops in the mid abdomen although overall improved compared to  image from recent CT examination dated 9/19/2022 ? Reactive ileus in the setting of distal ileal pouch inflammation as seen and described on prior CT    · Required 4mg dilaudid in ED with minimal improvement of pain  · Continue flagyl daily  · Continue Pantoprazole IV BID  · Initially NPO, advanced to clears which she is tolerating  · IVFs  · GI input appreciated

## 2022-09-23 NOTE — PLAN OF CARE
Problem: Nutrition/Hydration-ADULT  Goal: Nutrient/Hydration intake appropriate for improving, restoring or maintaining nutritional needs  Description: Monitor and assess patient's nutrition/hydration status for malnutrition  Collaborate with interdisciplinary team and initiate plan and interventions as ordered  Monitor patient's weight and dietary intake as ordered or per policy  Utilize nutrition screening tool and intervene as necessary  Determine patient's food preferences and provide high-protein, high-caloric foods as appropriate       INTERVENTIONS:  - Monitor oral intake, urinary output, labs, and treatment plans  - Assess nutrition and hydration status and recommend course of action  - Evaluate amount of meals eaten  - Assist patient with eating if necessary   - Allow adequate time for meals  - Recommend/ encourage appropriate diets, oral nutritional supplements, and vitamin/mineral supplements  - Order, calculate, and assess calorie counts as needed  - Recommend, monitor, and adjust tube feedings and TPN/PPN based on assessed needs  - Assess need for intravenous fluids  - Provide specific nutrition/hydration education as appropriate  - Include patient/family/caregiver in decisions related to nutrition  Outcome: Progressing     Problem: PAIN - ADULT  Goal: Verbalizes/displays adequate comfort level or baseline comfort level  Description: Interventions:  - Encourage patient to monitor pain and request assistance  - Assess pain using appropriate pain scale  - Administer analgesics based on type and severity of pain and evaluate response  - Implement non-pharmacological measures as appropriate and evaluate response  - Consider cultural and social influences on pain and pain management  - Notify physician/advanced practitioner if interventions unsuccessful or patient reports new pain  Outcome: Progressing     Problem: INFECTION - ADULT  Goal: Absence or prevention of progression during hospitalization  Description: INTERVENTIONS:  - Assess and monitor for signs and symptoms of infection  - Monitor lab/diagnostic results  - Monitor all insertion sites, i e  indwelling lines, tubes, and drains  - Monitor endotracheal if appropriate and nasal secretions for changes in amount and color  - [place] appropriate cooling/warming therapies per order  - Administer medications as ordered  - Instruct and encourage patient and family to use good hand hygiene technique  - Identify and instruct in appropriate isolation precautions for identified infection/condition  Outcome: Progressing     Problem: DISCHARGE PLANNING  Goal: Discharge to home or other facility with appropriate resources  Description: INTERVENTIONS:  - Identify barriers to discharge w/patient and caregiver  - Arrange for needed discharge resources and transportation as appropriate  - Identify discharge learning needs (meds, wound care, etc )  - Arrange for interpretive services to assist at discharge as needed  - Refer to Case Management Department for coordinating discharge planning if the patient needs post-hospital services based on physician/advanced practitioner order or complex needs related to functional status, cognitive ability, or social support system  Outcome: Progressing     Problem: Knowledge Deficit  Goal: Patient/family/caregiver demonstrates understanding of disease process, treatment plan, medications, and discharge instructions  Description: Complete learning assessment and assess knowledge base    Interventions:  - Provide teaching at level of understanding  - Provide teaching via preferred learning methods  Outcome: Progressing     Problem: GASTROINTESTINAL - ADULT  Goal: Minimal or absence of nausea and/or vomiting  Description: INTERVENTIONS:  - Administer IV fluids if ordered to ensure adequate hydration  - Maintain NPO status until nausea and vomiting are resolved  - Nasogastric tube if ordered  - Administer ordered antiemetic medications as needed  - Provide nonpharmacologic comfort measures as appropriate  - Advance diet as tolerated, if ordered  - Consider nutrition services referral to assist patient with adequate nutrition and appropriate food choices  Outcome: Progressing  Goal: Maintains or returns to baseline bowel function  Description: INTERVENTIONS:  - Assess bowel function  - Encourage oral fluids to ensure adequate hydration  - Administer IV fluids if ordered to ensure adequate hydration  - Administer ordered medications as needed  - Encourage mobilization and activity  - Consider nutritional services referral to assist patient with adequate nutrition and appropriate food choices  Outcome: Progressing  Goal: Maintains adequate nutritional intake  Description: INTERVENTIONS:  - Monitor percentage of each meal consumed  - Identify factors contributing to decreased intake, treat as appropriate  - Assist with meals as needed  - Monitor I&O, weight, and lab values if indicated  - Obtain nutrition services referral as needed  Outcome: Progressing

## 2022-09-23 NOTE — PROGRESS NOTES
[address]  Progress Note - Anselmo Race 1995, 32 y o  male MRN: [id number]  Unit/Bed#: [address] [place] [address] Encounter: [id number]  Primary Care Provider: Ary Walter DO   Date and time admitted to hospital: 9/20/2022  4:39 PM    * Intractable abdominal pain  Assessment & Plan  Patient presents with intractable epigastric pain  · History of ulcerative colitis status post total proctocolectomy with J-pouch ileoanal anastomosis, anastomotic stricture and persistent inflammation as well as questionable pouchitis and possible underlying Crohn's disease  · Patient recently discussed with surgeon in [place], plan for surgical reconstruction January 11th  · Xray abdomen shows Persistent gaseous distention of the ileal pouch with some mildly dilated air-filled bowel loops in the mid abdomen although overall improved compared to  image from recent CT examination dated 9/19/2022 ? Reactive ileus in the setting of distal ileal pouch inflammation as seen and described on prior CT  · Required 4mg dilaudid in ED with minimal improvement of pain  · Continue flagyl daily  · Continue Pantoprazole IV BID  · Initially NPO, advanced to clears which she is tolerating  · IVFs  · GI input appreciated    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[facility] [address] )  Assessment & Plan  Pt admitted last month with similar complaints  · Continue flagyl  · No indication for steroids at this time  · Appreciate recommendations from GI    History of Clostridium difficile infection  Assessment & Plan  History of c diff  · No episodes of diarrhea    Ankylosing spondylitis ([facility] [address] )  Assessment & Plan  Not currently on medications    CAR (generalized anxiety disorder)  Assessment & Plan  Continue Cymbalta      VTE Pharmacologic Prophylaxis:   Lovenox    Patient Centered Rounds: I performed bedside rounds with nursing staff today    Discussions with Specialists or Other Care Team Provider: yes - GI    Education and Discussions with Family / Patient: yes    Time Spent for Care: 20 minutes  More than 50% of total time spent on counseling and coordination of care as described above  Current Length of Stay: 1 day(s)  Current Patient Status: Inpatient   Certification Statement: The patient will continue to require additional inpatient hospital stay due to Abdominal pain requiring supportive treatment and slow advancement of diet  Discharge Plan: Anticipate discharge in 24-48 hrs to home  Code Status: Level 1 - Full Code    Subjective:     Patient sleeping  When woken up reports abdominal pain and nausea    Objective:     Vitals:   Temp (24hrs), Av °F (36 7 °C), Min:97 7 °F (36 5 °C), Max:98 2 °F (36 8 °C)    Temp:  [97 7 °F (36 5 °C)-98 2 °F (36 8 °C)] 98 2 °F (36 8 °C)  HR:  [65-76] 76  Resp:  [17-18] 18  BP: (117-125)/(77-81) 125/81  SpO2:  [93 %-95 %] 93 %  Body mass index is 26 88 kg/m²  Input and Output Summary (last 24 hours): Intake/Output Summary (Last 24 hours) at 2022 2215  Last data filed at 2022 1639  Gross per 24 hour   Intake 1000 ml   Output 2475 ml   Net -1475 ml       Physical Exam:   Physical Exam  Vitals reviewed  Constitutional:       General: He is not in acute distress  Appearance: He is not ill-appearing  HENT:      Head: Normocephalic and atraumatic  Eyes:      Extraocular Movements: Extraocular movements intact  Cardiovascular:      Rate and Rhythm: Normal rate and regular rhythm  Pulmonary:      Effort: Pulmonary effort is normal  No respiratory distress  Breath sounds: Normal breath sounds  No wheezing or rales  Abdominal:      General: Bowel sounds are normal  There is no distension  Palpations: Abdomen is soft  Tenderness: There is abdominal tenderness (Generalized)  There is no guarding  Neurological:      Mental Status: He is oriented to person, place, and time            Additional Data:     Labs:  Results from last 7 days   Lab Units 22  0636 22  0636 09/20/22  1707   WBC Thousand/uL 8 12   < > 9 46   HEMOGLOBIN g/dL 10 4*   < > 10 8*   HEMATOCRIT % 34 9*   < > 36 6   PLATELETS Thousands/uL 323   < > 364   NEUTROS PCT %  --   --  65   LYMPHS PCT %  --   --  20   MONOS PCT %  --   --  12   EOS PCT %  --   --  2    < > = values in this interval not displayed  Results from last 7 days   Lab Units 09/22/22  0636 09/21/22  0646 09/20/22  1707   SODIUM mmol/L 136   < > 138   POTASSIUM mmol/L 3 9   < > 3 4*   CHLORIDE mmol/L 103   < > 102   CO2 mmol/L 28   < > 29   BUN mg/dL 7   < > 10   CREATININE mg/dL 1 02   < > 1 21   ANION GAP mmol/L 5   < > 7   CALCIUM mg/dL 8 4   < > 8 8   ALBUMIN g/dL  --   --  3 6   TOTAL BILIRUBIN mg/dL  --   --  0 70   ALK PHOS U/L  --   --  91   ALT U/L  --   --  36   AST U/L  --   --  22   GLUCOSE RANDOM mg/dL 83   < > 106    < > = values in this interval not displayed  Lines/Drains:  Invasive Devices  Report    Peripheral Intravenous Line  Duration           Peripheral IV 09/20/22 Right Antecubital 2 days                Imaging: No pertinent imaging reviewed      Recent Cultures (last 7 days):         Last 24 Hours Medication List:   Current Facility-Administered Medications   Medication Dose Route Frequency Provider Last Rate    acetaminophen  650 mg Oral Q6H PRN Cathern Pap, PA-C      DULoxetine  60 mg Oral Daily Emmanuelle Vecdamariso, PA-C      enoxaparin  40 mg Subcutaneous Daily Cathern Pap, PA-C      HYDROmorphone  0 5 mg Intravenous Q3H PRN Cathern Pap, PA-C      metroNIDAZOLE  500 mg Oral Q8H [facility] Emmanuelle Vecdamariso, PA-C      ondansetron  4 mg Intravenous Q6H PRN Cathern Pap, PA-C      pantoprazole  40 mg Intravenous Q12H [facility] Emmanuelle Vecdamariso, PA-C      saccharomyces boulardii  250 mg Oral BID Cathern Pap, PA-C      sodium chloride  125 mL/hr Intravenous Continuous Emmanuelle Vecdamariso, PA-C 125 mL/hr (09/22/22 1451)        Today, Patient Was Seen By: Cynthia Carrera,     **Please Note: This note may have been constructed using a voice recognition system  **

## 2022-09-23 NOTE — ASSESSMENT & PLAN NOTE
Pt admitted last month with similar complaints  · Continue flagyl  · No indication for steroids at this time  · Appreciate recommendations from GI

## 2022-09-23 NOTE — PLAN OF CARE
Problem: Nutrition/Hydration-ADULT  Goal: Nutrient/Hydration intake appropriate for improving, restoring or maintaining nutritional needs  Description: Monitor and assess patient's nutrition/hydration status for malnutrition  Collaborate with interdisciplinary team and initiate plan and interventions as ordered  Monitor patient's weight and dietary intake as ordered or per policy  Utilize nutrition screening tool and intervene as necessary  Determine patient's food preferences and provide high-protein, high-caloric foods as appropriate       INTERVENTIONS:  - Monitor oral intake, urinary output, labs, and treatment plans  - Assess nutrition and hydration status and recommend course of action  - Evaluate amount of meals eaten  - Assist patient with eating if necessary   - Allow adequate time for meals  - Recommend/ encourage appropriate diets, oral nutritional supplements, and vitamin/mineral supplements  - Order, calculate, and assess calorie counts as needed  - Assess need for intravenous fluids  - Provide specific nutrition/hydration education as appropriate  - Include patient/family/caregiver in decisions related to nutrition  Outcome: Progressing     Problem: PAIN - ADULT  Goal: Verbalizes/displays adequate comfort level or baseline comfort level  Description: Interventions:  - Encourage patient to monitor pain and request assistance  - Assess pain using appropriate pain scale  - Administer analgesics based on type and severity of pain and evaluate response  - Implement non-pharmacological measures as appropriate and evaluate response  - Consider cultural and social influences on pain and pain management  - Notify physician/advanced practitioner if interventions unsuccessful or patient reports new pain  Outcome: Progressing     Problem: INFECTION - ADULT  Goal: Absence or prevention of progression during hospitalization  Description: INTERVENTIONS:  - Assess and monitor for signs and symptoms of infection  - Monitor lab/diagnostic results  - [place] appropriate cooling/warming therapies per order  - Administer medications as ordered  - Instruct and encourage patient and family to use good hand hygiene technique  - Identify and instruct in appropriate isolation precautions for identified infection/condition  Outcome: Progressing     Problem: DISCHARGE PLANNING  Goal: Discharge to home or other facility with appropriate resources  Description: INTERVENTIONS:  - Identify barriers to discharge w/patient and caregiver  - Arrange for needed discharge resources and transportation as appropriate  - Identify discharge learning needs (meds, wound care, etc )  - Arrange for interpretive services to assist at discharge as needed  - Refer to Case Management Department for coordinating discharge planning if the patient needs post-hospital services based on physician/advanced practitioner order or complex needs related to functional status, cognitive ability, or social support system  Outcome: Progressing     Problem: Knowledge Deficit  Goal: Patient/family/caregiver demonstrates understanding of disease process, treatment plan, medications, and discharge instructions  Description: Complete learning assessment and assess knowledge base    Interventions:  - Provide teaching at level of understanding  - Provide teaching via preferred learning methods  Outcome: Progressing     Problem: GASTROINTESTINAL - ADULT  Goal: Minimal or absence of nausea and/or vomiting  Description: INTERVENTIONS:  - Administer IV fluids if ordered to ensure adequate hydration  - Administer ordered antiemetic medications as needed  - Provide nonpharmacologic comfort measures as appropriate  - Advance diet as tolerated, if ordered  - Consider nutrition services referral to assist patient with adequate nutrition and appropriate food choices  Outcome: Progressing  Goal: Maintains or returns to baseline bowel function  Description: INTERVENTIONS:  - Assess bowel function  - Encourage oral fluids to ensure adequate hydration  - Administer IV fluids if ordered to ensure adequate hydration  - Administer ordered medications as needed  - Encourage mobilization and activity  - Consider nutritional services referral to assist patient with adequate nutrition and appropriate food choices  Outcome: Progressing  Goal: Maintains adequate nutritional intake  Description: INTERVENTIONS:  - Monitor percentage of each meal consumed  - Identify factors contributing to decreased intake, treat as appropriate  - Assist with meals as needed  - Monitor I&O, weight, and lab values if indicated  - Obtain nutrition services referral as needed  Outcome: Progressing

## 2022-09-23 NOTE — PROGRESS NOTES
Progress Note - [facility]  Jamarcus Moreno 32 y o  male MRN: [id number]    Unit/Bed#: [address] [place] [address] Encounter: [id number]      Assessment/Plan:  78-year-old male with past medical history of ulcerative colitis status post total proctocolectomy with J pouch ileal anal anastomosis, anastomotic stricture, persistent inflammation, question of pouchitis who presents to [place] [address] on 09/20 with report of abdominal pain        1  Abdominal pain associated with nausea and vomiting  Patient reported abdominal pain which increases in severity with onset on 9/19 at 2:00 a m     Patient also reported associated symptoms of nausea and vomiting   CT abdomen and pelvis with contrast done 9/19 showed fluid in the ileal pouch and minimal thickening of the distal ileum may represent nonspecific distal ileal-pouch itis in the appropriate clinical content   No perienteric inflammatory changes or abscess  X-ray abdominal obstructive series done 9/20 showed persistent gaseous distention of the ileum out with some mildly dilated air-filled bowel loops in the mid abdomen all through overall improved compared to  image from recent to CT exam dated 09/19/2022  Questionable reactive ileus in the setting of distal ileal pouch inflammation as seen and described on prior CT  Continues with generalized abdominal pain and tenderness with palpation  Tolerating clear liquids  Upper abdominal pain has improved    Patient having abdominal bloating and continues with pain in lower abdomen from umbilical area downward which is tender to palpation      -Clear liquid diet  -Continue supportive care and IV hydration  -Pain management per attending  -Continue antiemetics as needed for nausea or vomiting   -Continue Flagyl  -Continue pantoprazole b i d   -Patient is having a revision of J pouch done by surgeon in [place] on January 11, 2023    -Obtain CT of abdomen and pelvis with contrast for further evaluation of abdominal bloating and pain  Subjective:   Lying in bed in no acute distress  Tolerating clear liquid diet  No nausea or vomiting  Patient reports upper abdominal pain has improved but continues with abdominal bloating and pain in lower abdomen from umbilical area down  Objective:     Vitals: Blood pressure 127/85, pulse 68, temperature 97 9 °F (36 6 °C), temperature source Oral, resp  rate 20, height 5' 9" (1 753 m), weight 82 6 kg (182 lb), SpO2 92 %  ,Body mass index is 26 88 kg/m²  Intake/Output Summary (Last 24 hours) at 9/23/2022 1323  Last data filed at 9/23/2022 1230  Gross per 24 hour   Intake 4958 ml   Output 1850 ml   Net 3108 ml       Physical Exam: Physical Exam  Vitals and nursing note reviewed  Constitutional:       General: He is not in acute distress  Cardiovascular:      Rate and Rhythm: Normal rate and regular rhythm  Pulses: Normal pulses  Heart sounds: Normal heart sounds  Pulmonary:      Effort: Pulmonary effort is normal  No respiratory distress  Breath sounds: Normal breath sounds  No stridor  No wheezing, rhonchi or rales  Abdominal:      General: Bowel sounds are normal  There is distension  Palpations: Abdomen is soft  Tenderness: There is abdominal tenderness  There is no guarding or rebound  Hernia: No hernia is present  Comments: Mild abdominal distension but abdomen is soft  Tender to palpation from umbilical area down  Musculoskeletal:      Right lower leg: No edema  Left lower leg: No edema  Skin:     General: Skin is warm and dry  Capillary Refill: Capillary refill takes less than 2 seconds  Coloration: Skin is not jaundiced or pale  Neurological:      Mental Status: He is alert and oriented to person, place, and time     Psychiatric:         Mood and Affect: Mood normal          Invasive Devices  Report    Peripheral Intravenous Line  Duration           Peripheral IV 09/20/22 Right Antecubital 2 days Current Facility-Administered Medications:     acetaminophen (TYLENOL) tablet 650 mg, 650 mg, Oral, Q6H PRN, Yves Maradiaga PA-C    DULoxetine (CYMBALTA) delayed release capsule 60 mg, 60 mg, Oral, Daily, Emmanuelle Vecellio, PA-C, 60 mg at 09/23/22 0845    enoxaparin (LOVENOX) subcutaneous injection 40 mg, 40 mg, Subcutaneous, Daily, Emmanuelle Vecellio, PA-C, 40 mg at 09/23/22 0845    HYDROmorphone (DILAUDID) injection 0 5 mg, 0 5 mg, Intravenous, Q3H PRN, Emmanuelle Vecellio, PA-C, 0 5 mg at 09/23/22 1041    iohexol (OMNIPAQUE) 240 MG/ML solution 50 mL, 50 mL, Oral, Once in imaging, [place] [facility]    metroNIDAZOLE (FLAGYL) tablet 500 mg, 500 mg, Oral, Q8H [address], Emmanuelle Vecellio, PA-C, 500 mg at 09/23/22 0646    ondansetron (ZOFRAN) injection 4 mg, 4 mg, Intravenous, Q6H PRN, Emmanuelle Vecellio, PA-C, 4 mg at 09/23/22 0654    pantoprazole (PROTONIX) injection 40 mg, 40 mg, Intravenous, Q12H [address], Emmanuelle Vecellio, PA-C, 40 mg at 09/23/22 0845    saccharomyces boulardii (FLORASTOR) capsule 250 mg, 250 mg, Oral, BID, Emmanuelle Vecellio, PA-C, 250 mg at 09/23/22 0845    sodium chloride 0 9 % infusion, 125 mL/hr, Intravenous, Continuous, Emmanuelle Vecellio, PA-C, Last Rate: 125 mL/hr at 09/23/22 0654, 125 mL/hr at 09/23/22 0654    Lab Results: No results found for this or any previous visit (from the past 24 hour(s))  Imaging Studies: XR abdomen obstruction series    Result Date: 9/20/2022  Narrative: OBSTRUCTION SERIES INDICATION:   Upper abdominal pain or vomiting  COMPARISON:  9/19/2022 EXAM PERFORMED/VIEWS:  XR ABDOMEN OBSTRUCTION SERIES FINDINGS: Surgical chain sutures again noted in the left hemiabdomen and pelvis related to patient's total proctocolectomy with ileal pouch-anal anastomosis  Persistent gaseous distention of the ileal pouch noted with some mildly dilated air-filled bowel loops in  the mid abdomen  No free air beneath the hemidiaphragms   No pathologic calcifications or soft tissue masses evident  Osseous structures are unremarkable  Examination of the chest reveals a normal cardiomediastinal silhouette  Lungs are clear  Impression: Persistent gaseous distention of the ileal pouch with some mildly dilated air-filled bowel loops in the mid abdomen although overall improved compared to  image from recent CT examination dated 9/19/2022 ? Reactive ileus in the setting of distal ileal pouch inflammation as seen and described on prior CT  Continued follow-up recommended  Workstation performed: [id number]     CT abdomen pelvis with contrast    Result Date: 9/19/2022  Narrative: CT ABDOMEN AND PELVIS WITH IV CONTRAST INDICATION:   Abdominal pain, acute, nonlocalized severe persistent abdominal pain with N/V, patient concerned about obstruction  COMPARISON:  CT abdomen and pelvis 8/21/2022 TECHNIQUE:  CT examination of the abdomen and pelvis was performed  Axial, sagittal, and coronal 2D reformatted images were created from the source data and submitted for interpretation  Radiation dose length product (DLP) for this visit:  493 mGy-cm   This examination, like all CT scans performed in the [facility], was performed utilizing techniques to minimize radiation dose exposure, including the use of iterative reconstruction and automated exposure control  IV Contrast:  85 mL of iohexol (OMNIPAQUE)  350 Enteric Contrast:  Enteric contrast was administered  FINDINGS: ABDOMEN LOWER CHEST:  Minimal enteric contrast in the distal esophagus may be sequela of gastroesophageal reflux  LIVER/BILIARY TREE:  Unremarkable  GALLBLADDER:  No calcified gallstones  No pericholecystic inflammatory change  SPLEEN:  Mild splenomegaly measuring 14 5 cm in transverse diameter  PANCREAS:  Unremarkable  ADRENAL GLANDS:  Unremarkable  KIDNEYS/URETERS:  Unremarkable  No hydronephrosis  STOMACH AND BOWEL:  Post total proctocolectomy with ileal pouch - anal anastomosis   Mild gas fluid distention of the ileal pouch without significant wall thickening  Moderate gaseous distention of distal ileum proximal to the pouch with minimal wall thickening  Normal caliber proximal to mid small bowel  APPENDIX:  Post appendectomy  ABDOMINOPELVIC CAVITY:  No ascites  No pneumoperitoneum  Interval enlargement of few mesenteric lymph nodes; lymph nodes will be measured in short axis on series 2: Image 51, posterior mesentery, 1 1 cm previously 7 mm  Image 54, posterior mesentery, 1 1 cm previously 7 mm  Image 66, right external iliac, 1 6 cm previously 1 2 cm  VESSELS:  Unremarkable for patient's age  PELVIS REPRODUCTIVE ORGANS:  Unremarkable for patient's age  URINARY BLADDER:  Unremarkable  ABDOMINAL WALL/INGUINAL REGIONS:  Subcentimeter ventral umbilical abdominal wall diastases containing fat  No bowel herniation  No inguinal hernia or mass  OSSEOUS STRUCTURES:  No acute fracture or destructive osseous lesion  Small Schmorl's nodes in the lower thoracic spine  Mild stable bilateral sacroiliitis and right pubic sclerosis  Impression: Fluid in the ileal pouch and minimal thickening of distal ileum may represent nonspecific distal ileal-pouchitis in the appropriate clinical context  No perienteric inflammatory changes or abscess  Interval enlargement of few mesenteric lymph nodes and mild new splenomegaly  CT abdomen and pelvis follow-up in 3 months is advised in addition to clinical evaluation  No other significant interval change  This study demonstrates a significant  finding and was documented as such in [facility] for liaison and referring practitioner notification   Workstation performed: [id number]     Flexible Sigmoidoscopy    Addendum Date: 8/25/2022 Addendum:   [address] [place] [address] Operating Room [address] [place] [address] [phone] DATE OF SERVICE: 8/25/22 PHYSICIAN(S): Attending: Ari Barrios MD Fellow: No Staff Documented INDICATION: Intractable abdominal pain POST-OP DIAGNOSIS: See the impression below  HISTORY: Status post total proctocolectomy with J pouch BOWEL PREPARATION:  PREPROCEDURE: Informed consent was obtained for the procedure, including sedation  Risks including but not limited to bleeding, infection, perforation, adverse drug reaction and aspiration were explained in detail  Also explained about less than 100% sensitivity with the exam and other alternatives  The patient was placed in the left lateral decubitus position  DETAILS OF PROCEDURE: Patient was taken to the procedure room where a time out was performed to confirm correct patient and correct procedure  The patient underwent monitored anesthesia care, which was administered by an anesthesia professional  The patient's blood pressure, heart rate, level of consciousness, oxygen and respirations were monitored throughout the procedure  A digital rectal exam was performed  The adult gastroscope was introduced through the anus and advanced into the terminal ileum to the extent of the scope  Retroflexion was not performed due to altered anatomy  The patient's estimated blood loss was minimal (<5 mL)  The procedure was not difficult  The patient tolerated the procedure well  There were no apparent complications  ANESTHESIA INFORMATION: ASA: II Anesthesia Type: IV Sedation with Anesthesia MEDICATIONS: No administrations occurring from 1205 to 1232 on 08/25/22 FINDINGS: Ulcer  There were a few small focal ulcers at the proximal aspect of the pouch with slight associated mucosal hyperemia  The remainder of the pouch appeared unremarkable  There was no significant narrowing or angulation at the anastomosis  Multiple biopsies were obtained   EVENTS: Procedure Events Event Event Time ENDO SCOPE OUT TIME 8/25/2022 12:29 PM SPECIMENS: ID Type Source Tests Collected by Time Destination 1 : ileal pouch r/o pouchitis  Tissue Terminal Ileum TISSUE EXAM Mitra Trinh MD 8/25/2022 12:25 PM  EQUIPMENT: Scope not documented IMPRESSION: Mild inflammation of the proximal pouch with several small focal ulcers, but otherwise normal endoscopic appearance  Biopsies obtained RECOMMENDATION: Await pathology results Continue current management Consider MR enterography for further evaluation of the more proximal small bowel  Javier Galvin MD     Result Date: 8/25/2022  Narrative: [facility] Operating Room [address] [place] [address] [phone] DATE OF SERVICE: 8/25/22 PHYSICIAN(S): Attending: Javier Galvin MD Fellow: No Staff Documented INDICATION: Intractable abdominal pain POST-OP DIAGNOSIS: See the impression below  HISTORY: Status post total proctocolectomy with J pouch BOWEL PREPARATION:  PREPROCEDURE: Informed consent was obtained for the procedure, including sedation  Risks including but not limited to bleeding, infection, perforation, adverse drug reaction and aspiration were explained in detail  Also explained about less than 100% sensitivity with the exam and other alternatives  The patient was placed in the left lateral decubitus position  DETAILS OF PROCEDURE: Patient was taken to the procedure room where a time out was performed to confirm correct patient and correct procedure  The patient underwent monitored anesthesia care, which was administered by an anesthesia professional  The patient's blood pressure, heart rate, level of consciousness, oxygen and respirations were monitored throughout the procedure  A digital rectal exam was performed  The adult gastroscope was introduced through the anus and advanced into the terminal ileum to the extent of the scope  Retroflexion was not performed due to altered anatomy  The patient's estimated blood loss was minimal (<5 mL)  The procedure was not difficult  The patient tolerated the procedure well  There were no apparent complications   ANESTHESIA INFORMATION: ASA: II Anesthesia Type: IV Sedation with Anesthesia MEDICATIONS: No administrations occurring from 1205 to 1232 on 08/25/22 FINDINGS: Ulcer  There were a few small focal ulcers at the proximal aspect of the pouch with slight associated mucosal hyperemia  The remainder of the pouch appeared unremarkable  There was no significant narrowing or angulation at the anastomosis  Multiple biopsies were obtained  EVENTS: Procedure Events Event Event Time ENDO SCOPE OUT TIME 8/25/2022 12:29 PM SPECIMENS: ID Type Source Tests Collected by Time Destination 1 : ileal pouch r/o pouchitis  Tissue Terminal Ileum TISSUE EXAM Symone Shrestha MD 8/25/2022 12:25 PM  EQUIPMENT: Scope not documented     Impression: Mild inflammation of the proximal pouch with several small focal ulcers, but otherwise normal endoscopic appearance  Biopsies obtained RECOMMENDATION: Await pathology results Continue current management Consider CT or MR enterography for further evaluation of the more proximal small bowel  MD Maria De Jesus Manriquez CRNP      Please Note: "This note has been constructed using a voice recognition system  Therefore there may be syntax, spelling, and/or grammatical errors   Please call if you have any questions  "**

## 2022-09-24 LAB — CRP SERPL QL: 18 MG/L

## 2022-09-24 PROCEDURE — C9113 INJ PANTOPRAZOLE SODIUM, VIA: HCPCS

## 2022-09-24 PROCEDURE — 87505 NFCT AGENT DETECTION GI: CPT | Performed by: STUDENT IN AN ORGANIZED HEALTH CARE EDUCATION/TRAINING PROGRAM

## 2022-09-24 PROCEDURE — 99232 SBSQ HOSP IP/OBS MODERATE 35: CPT | Performed by: STUDENT IN AN ORGANIZED HEALTH CARE EDUCATION/TRAINING PROGRAM

## 2022-09-24 PROCEDURE — 87209 SMEAR COMPLEX STAIN: CPT | Performed by: STUDENT IN AN ORGANIZED HEALTH CARE EDUCATION/TRAINING PROGRAM

## 2022-09-24 PROCEDURE — 87177 OVA AND PARASITES SMEARS: CPT | Performed by: STUDENT IN AN ORGANIZED HEALTH CARE EDUCATION/TRAINING PROGRAM

## 2022-09-24 PROCEDURE — 87329 GIARDIA AG IA: CPT | Performed by: STUDENT IN AN ORGANIZED HEALTH CARE EDUCATION/TRAINING PROGRAM

## 2022-09-24 PROCEDURE — 99232 SBSQ HOSP IP/OBS MODERATE 35: CPT | Performed by: FAMILY MEDICINE

## 2022-09-24 PROCEDURE — 87493 C DIFF AMPLIFIED PROBE: CPT | Performed by: STUDENT IN AN ORGANIZED HEALTH CARE EDUCATION/TRAINING PROGRAM

## 2022-09-24 PROCEDURE — 86140 C-REACTIVE PROTEIN: CPT | Performed by: STUDENT IN AN ORGANIZED HEALTH CARE EDUCATION/TRAINING PROGRAM

## 2022-09-24 PROCEDURE — 82542 COL CHROMOTOGRAPHY QUAL/QUAN: CPT | Performed by: STUDENT IN AN ORGANIZED HEALTH CARE EDUCATION/TRAINING PROGRAM

## 2022-09-24 RX ORDER — METHYLPREDNISOLONE SODIUM SUCCINATE 40 MG/ML
20 INJECTION, POWDER, LYOPHILIZED, FOR SOLUTION INTRAMUSCULAR; INTRAVENOUS EVERY 8 HOURS SCHEDULED
Status: DISCONTINUED | OUTPATIENT
Start: 2022-09-24 | End: 2022-09-26 | Stop reason: HOSPADM

## 2022-09-24 RX ADMIN — PANTOPRAZOLE SODIUM 40 MG: 40 INJECTION, POWDER, FOR SOLUTION INTRAVENOUS at 09:24

## 2022-09-24 RX ADMIN — Medication 250 MG: at 09:24

## 2022-09-24 RX ADMIN — HYDROMORPHONE HYDROCHLORIDE 0.5 MG: 1 INJECTION, SOLUTION INTRAMUSCULAR; INTRAVENOUS; SUBCUTANEOUS at 20:47

## 2022-09-24 RX ADMIN — HYDROMORPHONE HYDROCHLORIDE 0.5 MG: 1 INJECTION, SOLUTION INTRAMUSCULAR; INTRAVENOUS; SUBCUTANEOUS at 17:25

## 2022-09-24 RX ADMIN — ENOXAPARIN SODIUM 40 MG: 40 INJECTION SUBCUTANEOUS at 09:24

## 2022-09-24 RX ADMIN — SODIUM CHLORIDE 125 ML/HR: 0.9 INJECTION, SOLUTION INTRAVENOUS at 13:19

## 2022-09-24 RX ADMIN — METHYLPREDNISOLONE SODIUM SUCCINATE 20 MG: 40 INJECTION, POWDER, FOR SOLUTION INTRAMUSCULAR; INTRAVENOUS at 21:57

## 2022-09-24 RX ADMIN — METRONIDAZOLE 500 MG: 500 TABLET ORAL at 14:30

## 2022-09-24 RX ADMIN — HYDROMORPHONE HYDROCHLORIDE 0.5 MG: 1 INJECTION, SOLUTION INTRAMUSCULAR; INTRAVENOUS; SUBCUTANEOUS at 13:18

## 2022-09-24 RX ADMIN — HYDROMORPHONE HYDROCHLORIDE 0.5 MG: 1 INJECTION, SOLUTION INTRAMUSCULAR; INTRAVENOUS; SUBCUTANEOUS at 01:47

## 2022-09-24 RX ADMIN — HYDROMORPHONE HYDROCHLORIDE 0.5 MG: 1 INJECTION, SOLUTION INTRAMUSCULAR; INTRAVENOUS; SUBCUTANEOUS at 09:24

## 2022-09-24 RX ADMIN — METHYLPREDNISOLONE SODIUM SUCCINATE 20 MG: 40 INJECTION, POWDER, FOR SOLUTION INTRAMUSCULAR; INTRAVENOUS at 15:29

## 2022-09-24 RX ADMIN — ONDANSETRON 4 MG: 2 INJECTION INTRAMUSCULAR; INTRAVENOUS at 01:47

## 2022-09-24 RX ADMIN — Medication 250 MG: at 17:25

## 2022-09-24 RX ADMIN — METRONIDAZOLE 500 MG: 500 TABLET ORAL at 21:57

## 2022-09-24 RX ADMIN — SODIUM CHLORIDE 125 ML/HR: 0.9 INJECTION, SOLUTION INTRAVENOUS at 05:24

## 2022-09-24 RX ADMIN — METRONIDAZOLE 500 MG: 500 TABLET ORAL at 05:22

## 2022-09-24 RX ADMIN — DULOXETINE HYDROCHLORIDE 60 MG: 60 CAPSULE, DELAYED RELEASE ORAL at 09:24

## 2022-09-24 RX ADMIN — HYDROMORPHONE HYDROCHLORIDE 0.5 MG: 1 INJECTION, SOLUTION INTRAMUSCULAR; INTRAVENOUS; SUBCUTANEOUS at 05:22

## 2022-09-24 RX ADMIN — SODIUM CHLORIDE 125 ML/HR: 0.9 INJECTION, SOLUTION INTRAVENOUS at 21:57

## 2022-09-24 RX ADMIN — PANTOPRAZOLE SODIUM 40 MG: 40 INJECTION, POWDER, FOR SOLUTION INTRAVENOUS at 20:47

## 2022-09-24 NOTE — ASSESSMENT & PLAN NOTE
Patient presents with intractable epigastric pain  · History of ulcerative colitis status post total proctocolectomy with J-pouch ileoanal anastomosis, anastomotic stricture and persistent inflammation as well as questionable pouchitis and possible underlying Crohn's disease  · Patient states plan for surgical reconstruction January 11th  · Xray abdomen 9/20 - Persistent gaseous distention of the ileal pouch with some mildly dilated air-filled bowel loops in the mid abdomen although overall improved compared to  image from recent CT examination dated 9/19/2022 ?   Reactive ileus in the setting of distal ileal pouch inflammation as seen and described on prior CT   · CT scan today showed acute segmental inflammation of small bowel segment proximal to the rectal pouch  · 60 mg of IV Solu-Medrol x1 ordered by GI  · Required 4mg dilaudid in ED with minimal improvement of pain  · Continue flagyl daily  · Continue Pantoprazole IV BID  · Initially NPO, advanced to clears which he is tolerating  · IVFs  · GI input appreciated

## 2022-09-24 NOTE — PLAN OF CARE
Problem: Nutrition/Hydration-ADULT  Goal: Nutrient/Hydration intake appropriate for improving, restoring or maintaining nutritional needs  Description: Monitor and assess patient's nutrition/hydration status for malnutrition  Collaborate with interdisciplinary team and initiate plan and interventions as ordered  Monitor patient's weight and dietary intake as ordered or per policy  Utilize nutrition screening tool and intervene as necessary  Determine patient's food preferences and provide high-protein, high-caloric foods as appropriate       INTERVENTIONS:  - Monitor oral intake, urinary output, labs, and treatment plans  - Assess nutrition and hydration status and recommend course of action  - Evaluate amount of meals eaten  - Assist patient with eating if necessary   - Allow adequate time for meals  - Recommend/ encourage appropriate diets, oral nutritional supplements, and vitamin/mineral supplements  - Order, calculate, and assess calorie counts as needed  - Assess need for intravenous fluids  - Provide specific nutrition/hydration education as appropriate  - Include patient/family/caregiver in decisions related to nutrition  Outcome: Progressing     Problem: PAIN - ADULT  Goal: Verbalizes/displays adequate comfort level or baseline comfort level  Description: Interventions:  - Encourage patient to monitor pain and request assistance  - Assess pain using appropriate pain scale  - Administer analgesics based on type and severity of pain and evaluate response  - Implement non-pharmacological measures as appropriate and evaluate response  - Consider cultural and social influences on pain and pain management  - Notify physician/advanced practitioner if interventions unsuccessful or patient reports new pain  Outcome: Progressing     Problem: INFECTION - ADULT  Goal: Absence or prevention of progression during hospitalization  Description: INTERVENTIONS:  - Assess and monitor for signs and symptoms of infection  - Monitor lab/diagnostic results  - [place] appropriate cooling/warming therapies per order  - Administer medications as ordered  - Instruct and encourage patient and family to use good hand hygiene technique  - Identify and instruct in appropriate isolation precautions for identified infection/condition  Outcome: Progressing     Problem: DISCHARGE PLANNING  Goal: Discharge to home or other facility with appropriate resources  Description: INTERVENTIONS:  - Identify barriers to discharge w/patient and caregiver  - Arrange for needed discharge resources and transportation as appropriate  - Identify discharge learning needs (meds, wound care, etc )  - Arrange for interpretive services to assist at discharge as needed  - Refer to Case Management Department for coordinating discharge planning if the patient needs post-hospital services based on physician/advanced practitioner order or complex needs related to functional status, cognitive ability, or social support system  Outcome: Progressing     Problem: Knowledge Deficit  Goal: Patient/family/caregiver demonstrates understanding of disease process, treatment plan, medications, and discharge instructions  Description: Complete learning assessment and assess knowledge base    Interventions:  - Provide teaching at level of understanding  - Provide teaching via preferred learning methods  Outcome: Progressing     Problem: GASTROINTESTINAL - ADULT  Goal: Minimal or absence of nausea and/or vomiting  Description: INTERVENTIONS:  - Administer IV fluids if ordered to ensure adequate hydration  - Administer ordered antiemetic medications as needed  - Provide nonpharmacologic comfort measures as appropriate  - Advance diet as tolerated, if ordered  - Consider nutrition services referral to assist patient with adequate nutrition and appropriate food choices  Outcome: Progressing  Goal: Maintains or returns to baseline bowel function  Description: INTERVENTIONS:  - Assess bowel function  - Encourage oral fluids to ensure adequate hydration  - Administer IV fluids if ordered to ensure adequate hydration  - Administer ordered medications as needed  - Encourage mobilization and activity  - Consider nutritional services referral to assist patient with adequate nutrition and appropriate food choices  Outcome: Progressing  Goal: Maintains adequate nutritional intake  Description: INTERVENTIONS:  - Monitor percentage of each meal consumed  - Identify factors contributing to decreased intake, treat as appropriate  - Assist with meals as needed  - Monitor I&O, weight, and lab values if indicated  - Obtain nutrition services referral as needed  Outcome: Progressing

## 2022-09-24 NOTE — PROGRESS NOTES
Progress Note -  [facility]  Gary Bansal 32 y o  male MRN: [id number]  Unit/Bed#: [address] [place] [address] Encounter: [id number]      ASSESSMENT AND PLAN:  32year-old male with history of UC s/p total proctocolectomy with ileoanal pouch anastomosis c/b pouchitis and anastomotic stricture requiring dilation who presents for abdominal pain with nausea and vomiting  He has recurrent admissions for abdominal pain with question of pouchitis based on CT  He has trialed antibiotics without improvement and had a flexible sigmoidoscopy on 8/24 demonstrated ulceration in the proximal aspect of the ileal pouch with biopsies which showed chronic inflammation with villous blunting and architectural distortion  His most recent CT showed acute segmental inflammation involving the R pelvic small bowel proximal to the pouch and he was started on IV solumedrol with improvement of his symptoms  He is also followed by colorectal surgery at [facility] who is considering revision of J pouch next year  Unclear if his symptoms are due to chronic/refractory pouchitis vs  ?Crohn's disease given proximal involvement and pathology suggestive of chronic inflammation in the context of elevated CRP  Will continue IV steroids and send pre-biologic labs in the event he needs escalation of therapy   Will continue to follow and thank you for the opportunity to consult in his care    - Solumedrol 20 mg TID  - Flagyl 500 mg TID   - Send Cdiff PCR, stool enteric panel, and O&P  - HBV serologies, TMPT enzyme activity and quant gold   - Trend CRP  - Consider CT or MR enterography to evaluate extent of disease     Amaya Pozo MD  ______________________________________________________________________    Subjective:  - Patient improving with IV solumedrol     INPATIENT MEDICATIONS:   Medications Prior to Admission   Medication    DULoxetine (CYMBALTA) 60 mg delayed release capsule    metroNIDAZOLE (FLAGYL) 500 mg tablet    oxyCODONE-acetaminophen (Percocet) 5-325 mg per tablet    Florastor 250 MG capsule    saccharomyces boulardii (FLORASTOR) 250 mg capsule    SULFASALAZINE PO       Objective   /66 (BP Location: Left arm)   Pulse 63   Temp 97 7 °F (36 5 °C) (Oral)   Resp 18   Ht 5' 9" (1 753 m)   Wt 82 6 kg (182 lb)   SpO2 96%   BMI 26 88 kg/m²     PHYSICAL EXAM:    General: No acute distress  HEENT: No scleral icterus, normocephalic  Abdomen: Soft, non-tender, non-distended, normoactive bowel sounds, no masses, no hepatomegaly  Extremities: No lower extremity edema  Skin: No jaundice  Neuro: Awake, alert, oriented x 3    Lab Results:   Lab Results   Component Value Date    WBC 8 12 09/22/2022    HGB 10 4 (L) 09/22/2022    HCT 34 9 (L) 09/22/2022    MCV 64 (L) 09/22/2022     09/22/2022     Lab Results   Component Value Date     10/31/2015    SODIUM 136 09/22/2022    K 3 9 09/22/2022     09/22/2022    CO2 28 09/22/2022    ANIONGAP 10 0 10/31/2015    AGAP 5 09/22/2022    BUN 7 09/22/2022    CREATININE 1 02 09/22/2022    GLUC 83 09/22/2022    GLUF 79 09/21/2022    CALCIUM 8 4 09/22/2022    AST 22 09/20/2022    ALT 36 09/20/2022    ALKPHOS 91 09/20/2022    PROT 8 1 10/30/2015    TP 7 5 09/20/2022    BILITOT 0 6 10/30/2015    TBILI 0 70 09/20/2022    EGFR 98 09/22/2022     Lab Results   Component Value Date    INR 1 00 07/06/2022    INR 0 99 06/05/2022    INR 0 94 03/06/2022    PROTIME 13 0 07/06/2022    PROTIME 12 9 06/05/2022    PROTIME 12 4 03/06/2022     Lab Results   Component Value Date    IRON 17 (L) 02/25/2022    TIBC 270 02/25/2022    FERRITIN 257 02/25/2022       Imaging Studies: I have personally reviewed pertinent imaging studies      XR abdomen obstruction series    Result Date: 9/20/2022  Impression: Persistent gaseous distention of the ileal pouch with some mildly dilated air-filled bowel loops in the mid abdomen although overall improved compared to  image from recent CT examination dated 9/19/2022 ? Reactive ileus in the setting of distal ileal pouch inflammation as seen and described on prior CT  Continued follow-up recommended  Workstation performed: [id number]     CT abdomen pelvis w contrast    Result Date: 9/23/2022  Impression: Acute segmental inflammation involving the right pelvic small bowel segment proximal to the rectal pouch  Minimal inflammatory fluid  Unchanged 2 enlarged lymph nodes in the pelvis and lower abdominal mesentery   Workstation performed: [id number]

## 2022-09-24 NOTE — PROGRESS NOTES
[facility] 43  Progress Note - Felipa Lopez 1995, 32 y o  male MRN: [id number]  Unit/Bed#: [address] [place] [address] Encounter: [id number]  Primary Care Provider: Elly William DO   Date and time admitted to hospital: 9/20/2022  4:39 PM    * Intractable abdominal pain  Assessment & Plan  Patient presents with intractable epigastric pain  · History of ulcerative colitis status post total proctocolectomy with J-pouch ileoanal anastomosis, anastomotic stricture and persistent inflammation as well as questionable pouchitis and possible underlying Crohn's disease  · Patient states plan for surgical reconstruction January 11th  · Xray abdomen 9/20 - Persistent gaseous distention of the ileal pouch with some mildly dilated air-filled bowel loops in the mid abdomen although overall improved compared to  image from recent CT examination dated 9/19/2022 ? Reactive ileus in the setting of distal ileal pouch inflammation as seen and described on prior CT   · CT scan 9/23 showed acute segmental inflammation of small bowel segment proximal to the rectal pouch  · 60 mg of IV Solu-Medrol x1 given on 09/23  Started on 20 mg of IV Solu-Medrol q 8 hours by GI today  · Required 4mg dilaudid in ED with minimal improvement of pain  · Continue flagyl daily  · Continue Pantoprazole IV BID  · Initially NPO --> clears    Advance to full liquids today and continue to advance as tolerated tomorrow  · IVFs  · GI input appreciated    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[facility] )  Assessment & Plan  Pt admitted last month with similar complaints  · Continue flagyl  · CT scan and IV steroids as noted above   · Appreciate recommendations from GI    History of Clostridium difficile infection  Assessment & Plan  History of c diff  · No diarrhea  · C diff, other stool studies ordered by GI    Ankylosing spondylitis ([facility] )  Assessment & Plan  Not currently on any med    CAR (generalized anxiety disorder)  Assessment & Plan  Continue Cymbalta      VTE Pharmacologic Prophylaxis:   Lovenox    Patient Centered Rounds: I performed bedside rounds with nursing staff today  Discussions with Specialists or Other Care Team Provider: yes - GI    Education and Discussions with Family / Patient: yes    Time Spent for Care: 25 min  More than 50% of total time spent on counseling and coordination of care as described above  Current Length of Stay: 3 day(s)  Current Patient Status: Inpatient   Certification Statement: The patient will continue to require additional inpatient hospital stay due to Abdominal pain requiring advancement of diet and IV steroid  Discharge Plan: Anticipate discharge in 24-48 hrs to home  Code Status: Level 1 - Full Code    Subjective:     Patient reports improving abdominal pain although not fully resolved  Pain is in the lower abdomen    Objective:     Vitals:   Temp (24hrs), Av 8 °F (36 6 °C), Min:97 6 °F (36 4 °C), Max:97 9 °F (36 6 °C)    Temp:  [97 6 °F (36 4 °C)-97 9 °F (36 6 °C)] 97 7 °F (36 5 °C)  HR:  [63-78] 63  Resp:  [18] 18  BP: (113-130)/(66-93) 113/66  SpO2:  [95 %-100 %] 96 %  Body mass index is 26 88 kg/m²  Input and Output Summary (last 24 hours): Intake/Output Summary (Last 24 hours) at 2022 1003  Last data filed at 2022 1950  Gross per 24 hour   Intake 1680 ml   Output 250 ml   Net 1430 ml       Physical Exam:   Physical Exam  Vitals reviewed  Constitutional:       General: He is not in acute distress  Appearance: He is not ill-appearing  HENT:      Head: Normocephalic and atraumatic  Eyes:      General:         Right eye: No discharge  Left eye: No discharge  Cardiovascular:      Rate and Rhythm: Normal rate and regular rhythm  Pulmonary:      Effort: Pulmonary effort is normal  No respiratory distress  Breath sounds: Normal breath sounds  No wheezing or rales  Abdominal:      General: Bowel sounds are normal  There is no distension        Palpations: Abdomen is soft  Tenderness: There is abdominal tenderness (lower abdomen)  Neurological:      Mental Status: He is alert and oriented to person, place, and time  Additional Data:     Labs:  Results from last 7 days   Lab Units 09/22/22  0636 09/21/22  0646 09/20/22  1707   WBC Thousand/uL 8 12   < > 9 46   HEMOGLOBIN g/dL 10 4*   < > 10 8*   HEMATOCRIT % 34 9*   < > 36 6   PLATELETS Thousands/uL 323   < > 364   NEUTROS PCT %  --   --  65   LYMPHS PCT %  --   --  20   MONOS PCT %  --   --  12   EOS PCT %  --   --  2    < > = values in this interval not displayed  Results from last 7 days   Lab Units 09/22/22  0636 09/21/22  0646 09/20/22  1707   SODIUM mmol/L 136   < > 138   POTASSIUM mmol/L 3 9   < > 3 4*   CHLORIDE mmol/L 103   < > 102   CO2 mmol/L 28   < > 29   BUN mg/dL 7   < > 10   CREATININE mg/dL 1 02   < > 1 21   ANION GAP mmol/L 5   < > 7   CALCIUM mg/dL 8 4   < > 8 8   ALBUMIN g/dL  --   --  3 6   TOTAL BILIRUBIN mg/dL  --   --  0 70   ALK PHOS U/L  --   --  91   ALT U/L  --   --  36   AST U/L  --   --  22   GLUCOSE RANDOM mg/dL 83   < > 106    < > = values in this interval not displayed                         Lines/Drains:  Invasive Devices  Report    Peripheral Intravenous Line  Duration           Peripheral IV 09/20/22 Right Antecubital 3 days              Imaging: Reviewed radiology reports from this admission including: abdominal/pelvic CT    Recent Cultures (last 7 days):         Last 24 Hours Medication List:   Current Facility-Administered Medications   Medication Dose Route Frequency Provider Last Rate    acetaminophen  650 mg Oral Q6H PRN Shahana Wells PA-C      DULoxetine  60 mg Oral Daily Emmanuelle SELENA Dodge      enoxaparin  40 mg Subcutaneous Daily Emmanuelle SELENA Dodge      HYDROmorphone  0 5 mg Intravenous Q3H PRN Emmanuelle VecSELENA rios      iohexol  50 mL Oral Once in imaging [place] [facility]      metroNIDAZOLE  500 mg Oral Q8H [facility] [place] Shahana Wells PA-C  ondansetron  4 mg Intravenous Q6H PRN Peyman Albert PA-C      pantoprazole  40 mg Intravenous Q12H [address] Emmanuelle SELENA Dodge      saccharomyces boulardii  250 mg Oral BID Peyman Albert PA-C      sodium chloride  125 mL/hr Intravenous Continuous Emmanuelle SELENA Dodge 125 mL/hr (09/24/22 0524)        Today, Patient Was Seen By: Jojo Hardwick DO    **Please Note: This note may have been constructed using a voice recognition system  **

## 2022-09-24 NOTE — ASSESSMENT & PLAN NOTE
Pt admitted last month with similar complaints  · Continue flagyl  · CT scan and IV steroids as noted above   · Appreciate recommendations from GI

## 2022-09-24 NOTE — ASSESSMENT & PLAN NOTE
Pt admitted last month with similar complaints  · Continue flagyl  · CT scan and IV steroid x1 as noted above  · Appreciate recommendations from GI

## 2022-09-24 NOTE — ASSESSMENT & PLAN NOTE
Patient presents with intractable epigastric pain  · History of ulcerative colitis status post total proctocolectomy with J-pouch ileoanal anastomosis, anastomotic stricture and persistent inflammation as well as questionable pouchitis and possible underlying Crohn's disease  · Patient states plan for surgical reconstruction January 11th  · Xray abdomen 9/20 - Persistent gaseous distention of the ileal pouch with some mildly dilated air-filled bowel loops in the mid abdomen although overall improved compared to  image from recent CT examination dated 9/19/2022 ? Reactive ileus in the setting of distal ileal pouch inflammation as seen and described on prior CT   · CT scan 9/23 showed acute segmental inflammation of small bowel segment proximal to the rectal pouch  · 60 mg of IV Solu-Medrol x1 given on 09/23  Started on 20 mg of IV Solu-Medrol q 8 hours by GI today  · Required 4mg dilaudid in ED with minimal improvement of pain  · Continue flagyl daily  · Continue Pantoprazole IV BID  · Initially NPO --> clears    Advance to full liquids today and continue to advance as tolerated tomorrow  · IVFs  · GI input appreciated

## 2022-09-25 PROBLEM — Z86.19 HISTORY OF CLOSTRIDIUM DIFFICILE INFECTION: Status: RESOLVED | Noted: 2022-05-30 | Resolved: 2022-09-25

## 2022-09-25 LAB
ANION GAP SERPL CALCULATED.3IONS-SCNC: 7 MMOL/L (ref 4–13)
BUN SERPL-MCNC: 6 MG/DL (ref 5–25)
CALCIUM SERPL-MCNC: 8.5 MG/DL (ref 8.3–10.1)
CHLORIDE SERPL-SCNC: 105 MMOL/L (ref 96–108)
CO2 SERPL-SCNC: 28 MMOL/L (ref 21–32)
CREAT SERPL-MCNC: 0.92 MG/DL (ref 0.6–1.3)
CRP SERPL QL: 12.7 MG/L
GFR SERPL CREATININE-BSD FRML MDRD: 111 ML/MIN/1.73SQ M
GLUCOSE SERPL-MCNC: 126 MG/DL (ref 65–140)
HBV CORE AB SER QL: NORMAL
HBV SURFACE AB SER-ACNC: 146.5 MIU/ML
HBV SURFACE AG SER QL: NORMAL
POTASSIUM SERPL-SCNC: 4.1 MMOL/L (ref 3.5–5.3)
SODIUM SERPL-SCNC: 140 MMOL/L (ref 135–147)

## 2022-09-25 PROCEDURE — 86140 C-REACTIVE PROTEIN: CPT | Performed by: STUDENT IN AN ORGANIZED HEALTH CARE EDUCATION/TRAINING PROGRAM

## 2022-09-25 PROCEDURE — 99232 SBSQ HOSP IP/OBS MODERATE 35: CPT | Performed by: FAMILY MEDICINE

## 2022-09-25 PROCEDURE — 86480 TB TEST CELL IMMUN MEASURE: CPT | Performed by: STUDENT IN AN ORGANIZED HEALTH CARE EDUCATION/TRAINING PROGRAM

## 2022-09-25 PROCEDURE — 80048 BASIC METABOLIC PNL TOTAL CA: CPT | Performed by: FAMILY MEDICINE

## 2022-09-25 PROCEDURE — 86706 HEP B SURFACE ANTIBODY: CPT | Performed by: STUDENT IN AN ORGANIZED HEALTH CARE EDUCATION/TRAINING PROGRAM

## 2022-09-25 PROCEDURE — 99232 SBSQ HOSP IP/OBS MODERATE 35: CPT | Performed by: STUDENT IN AN ORGANIZED HEALTH CARE EDUCATION/TRAINING PROGRAM

## 2022-09-25 PROCEDURE — C9113 INJ PANTOPRAZOLE SODIUM, VIA: HCPCS

## 2022-09-25 PROCEDURE — 87340 HEPATITIS B SURFACE AG IA: CPT | Performed by: STUDENT IN AN ORGANIZED HEALTH CARE EDUCATION/TRAINING PROGRAM

## 2022-09-25 PROCEDURE — 86704 HEP B CORE ANTIBODY TOTAL: CPT | Performed by: STUDENT IN AN ORGANIZED HEALTH CARE EDUCATION/TRAINING PROGRAM

## 2022-09-25 RX ADMIN — HYDROMORPHONE HYDROCHLORIDE 0.5 MG: 1 INJECTION, SOLUTION INTRAMUSCULAR; INTRAVENOUS; SUBCUTANEOUS at 15:21

## 2022-09-25 RX ADMIN — HYDROMORPHONE HYDROCHLORIDE 0.5 MG: 1 INJECTION, SOLUTION INTRAMUSCULAR; INTRAVENOUS; SUBCUTANEOUS at 00:06

## 2022-09-25 RX ADMIN — HYDROMORPHONE HYDROCHLORIDE 0.5 MG: 1 INJECTION, SOLUTION INTRAMUSCULAR; INTRAVENOUS; SUBCUTANEOUS at 11:42

## 2022-09-25 RX ADMIN — HYDROMORPHONE HYDROCHLORIDE 0.5 MG: 1 INJECTION, SOLUTION INTRAMUSCULAR; INTRAVENOUS; SUBCUTANEOUS at 19:00

## 2022-09-25 RX ADMIN — HYDROMORPHONE HYDROCHLORIDE 0.5 MG: 1 INJECTION, SOLUTION INTRAMUSCULAR; INTRAVENOUS; SUBCUTANEOUS at 08:28

## 2022-09-25 RX ADMIN — METHYLPREDNISOLONE SODIUM SUCCINATE 20 MG: 40 INJECTION, POWDER, FOR SOLUTION INTRAMUSCULAR; INTRAVENOUS at 13:02

## 2022-09-25 RX ADMIN — ONDANSETRON 4 MG: 2 INJECTION INTRAMUSCULAR; INTRAVENOUS at 03:46

## 2022-09-25 RX ADMIN — METRONIDAZOLE 500 MG: 500 TABLET ORAL at 13:02

## 2022-09-25 RX ADMIN — METHYLPREDNISOLONE SODIUM SUCCINATE 20 MG: 40 INJECTION, POWDER, FOR SOLUTION INTRAMUSCULAR; INTRAVENOUS at 05:00

## 2022-09-25 RX ADMIN — SODIUM CHLORIDE 125 ML/HR: 0.9 INJECTION, SOLUTION INTRAVENOUS at 13:05

## 2022-09-25 RX ADMIN — DULOXETINE HYDROCHLORIDE 60 MG: 60 CAPSULE, DELAYED RELEASE ORAL at 08:28

## 2022-09-25 RX ADMIN — METRONIDAZOLE 500 MG: 500 TABLET ORAL at 21:21

## 2022-09-25 RX ADMIN — PANTOPRAZOLE SODIUM 40 MG: 40 INJECTION, POWDER, FOR SOLUTION INTRAVENOUS at 08:28

## 2022-09-25 RX ADMIN — Medication 250 MG: at 19:00

## 2022-09-25 RX ADMIN — PANTOPRAZOLE SODIUM 40 MG: 40 INJECTION, POWDER, FOR SOLUTION INTRAVENOUS at 21:21

## 2022-09-25 RX ADMIN — Medication 250 MG: at 08:28

## 2022-09-25 RX ADMIN — METHYLPREDNISOLONE SODIUM SUCCINATE 20 MG: 40 INJECTION, POWDER, FOR SOLUTION INTRAMUSCULAR; INTRAVENOUS at 21:21

## 2022-09-25 RX ADMIN — ENOXAPARIN SODIUM 40 MG: 40 INJECTION SUBCUTANEOUS at 08:28

## 2022-09-25 RX ADMIN — SODIUM CHLORIDE 125 ML/HR: 0.9 INJECTION, SOLUTION INTRAVENOUS at 20:34

## 2022-09-25 RX ADMIN — HYDROMORPHONE HYDROCHLORIDE 0.5 MG: 1 INJECTION, SOLUTION INTRAMUSCULAR; INTRAVENOUS; SUBCUTANEOUS at 03:46

## 2022-09-25 RX ADMIN — METRONIDAZOLE 500 MG: 500 TABLET ORAL at 05:00

## 2022-09-25 RX ADMIN — HYDROMORPHONE HYDROCHLORIDE 0.5 MG: 1 INJECTION, SOLUTION INTRAMUSCULAR; INTRAVENOUS; SUBCUTANEOUS at 22:10

## 2022-09-25 NOTE — ASSESSMENT & PLAN NOTE
Patient presented with intractable epigastric pain  · History of ulcerative colitis status post total proctocolectomy with J-pouch ileoanal anastomosis, anastomotic stricture and persistent inflammation as well as questionable pouchitis and possible underlying Crohn's disease  · Patient states plan for surgical reconstruction January 11th  · Xray abdomen 9/20 - Persistent gaseous distention of the ileal pouch with some mildly dilated air-filled bowel loops in the mid abdomen although overall improved compared to  image from recent CT examination dated 9/19/2022 ? Reactive ileus in the setting of distal ileal pouch inflammation as seen and described on prior CT   · CT scan 9/23 showed acute segmental inflammation of small bowel segment proximal to the rectal pouch  · 60 mg of IV Solu-Medrol x1 given on 09/23  Started on 20 mg of IV Solu-Medrol q 8 hours by GI on 09/24  · CRP elevated but trending down  Repeat in a m    · Required 4mg dilaudid in ED with minimal improvement of pain  · Continue flagyl daily  · Continue Pantoprazole IV BID  · Initially NPO --> now diet has been advanced to low-fiber/low residue  · IVFs  · GI input appreciated

## 2022-09-25 NOTE — PROGRESS NOTES
Julian 43  Progress Note - Jeanine Saldana 1995, 32 y o  male MRN: [id number]  Unit/Bed#: [address] [place] [address] Encounter: [id number]  Primary Care Provider: Viral Beverly DO   Date and time admitted to hospital: 9/20/2022  4:39 PM    * Intractable abdominal pain  Assessment & Plan  Patient presented with intractable epigastric pain  · History of ulcerative colitis status post total proctocolectomy with J-pouch ileoanal anastomosis, anastomotic stricture and persistent inflammation as well as questionable pouchitis and possible underlying Crohn's disease  · Patient states plan for surgical reconstruction January 11th  · Xray abdomen 9/20 - Persistent gaseous distention of the ileal pouch with some mildly dilated air-filled bowel loops in the mid abdomen although overall improved compared to  image from recent CT examination dated 9/19/2022 ? Reactive ileus in the setting of distal ileal pouch inflammation as seen and described on prior CT   · CT scan 9/23 showed acute segmental inflammation of small bowel segment proximal to the rectal pouch  · 60 mg of IV Solu-Medrol x1 given on 09/23  Started on 20 mg of IV Solu-Medrol q 8 hours by GI on 09/24  · CRP elevated but trending down  Repeat in a m    · Required 4mg dilaudid in ED with minimal improvement of pain  · Continue flagyl daily  · Continue Pantoprazole IV BID  · Initially NPO --> now diet has been advanced to low-fiber/low residue  · IVFs  · GI input appreciated    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[address] )  Assessment & Plan  Pt admitted last month with similar complaints  · Continue Flagyl  · IV steroids as noted above   · Appreciate recommendations from GI    Ankylosing spondylitis [facility])  Assessment & Plan  Not currently on any medications    CAR (generalized anxiety disorder)  Assessment & Plan  Continue Cymbalta    History of Clostridium difficile infection-resolved as of 9/25/2022  Assessment & Plan  History of c diff  · Denies any diarrhea  · C diff, other stool studies ordered by GI        VTE Pharmacologic Prophylaxis:   Lovenox    Patient Centered Rounds: I performed bedside rounds with nursing staff today  Discussions with Specialists or Other Care Team Provider: yes    Education and Discussions with Family / Patient: yes    Time Spent for Care: 20 minutes  More than 50% of total time spent on counseling and coordination of care as described above  Current Length of Stay: 4 day(s)  Current Patient Status: Inpatient   Certification Statement: The patient will continue to require additional inpatient hospital stay due to Abdominal pain requiring advancement of diet and IV steroids  Discharge Plan: Anticipate discharge tomorrow to home  Code Status: Level 1 - Full Code    Subjective:     Patient continues to report improving abdominal pain  Tolerating diet so far    Objective:     Vitals:   Temp (24hrs), Av 5 °F (36 9 °C), Min:98 2 °F (36 8 °C), Max:98 7 °F (37 1 °C)    Temp:  [98 2 °F (36 8 °C)-98 7 °F (37 1 °C)] 98 4 °F (36 9 °C)  HR:  [72-79] 76  Resp:  [17-18] 18  BP: (125-134)/(77-86) 134/78  SpO2:  [93 %-97 %] 93 %  Body mass index is 26 88 kg/m²  Input and Output Summary (last 24 hours): Intake/Output Summary (Last 24 hours) at 2022 1700  Last data filed at 2022 1420  Gross per 24 hour   Intake 1875 ml   Output 500 ml   Net 1375 ml       Physical Exam:   Physical Exam  Vitals reviewed  Constitutional:       General: He is not in acute distress  Appearance: He is not ill-appearing  HENT:      Head: Normocephalic and atraumatic  Eyes:      General:         Right eye: No discharge  Left eye: No discharge  Extraocular Movements: Extraocular movements intact  Cardiovascular:      Rate and Rhythm: Normal rate and regular rhythm  Pulmonary:      Effort: Pulmonary effort is normal  No respiratory distress  Breath sounds: Normal breath sounds  No wheezing or rales     Abdominal: General: Bowel sounds are normal  There is no distension  Palpations: Abdomen is soft  Tenderness: There is abdominal tenderness (Mild lower abdomen)  There is no guarding  Neurological:      Mental Status: He is alert and oriented to person, place, and time  Additional Data:     Labs:  Results from last 7 days   Lab Units 09/22/22  0636 09/21/22  0646 09/20/22  1707   WBC Thousand/uL 8 12   < > 9 46   HEMOGLOBIN g/dL 10 4*   < > 10 8*   HEMATOCRIT % 34 9*   < > 36 6   PLATELETS Thousands/uL 323   < > 364   NEUTROS PCT %  --   --  65   LYMPHS PCT %  --   --  20   MONOS PCT %  --   --  12   EOS PCT %  --   --  2    < > = values in this interval not displayed  Results from last 7 days   Lab Units 09/25/22  0508 09/21/22  0646 09/20/22  1707   SODIUM mmol/L 140   < > 138   POTASSIUM mmol/L 4 1   < > 3 4*   CHLORIDE mmol/L 105   < > 102   CO2 mmol/L 28   < > 29   BUN mg/dL 6   < > 10   CREATININE mg/dL 0 92   < > 1 21   ANION GAP mmol/L 7   < > 7   CALCIUM mg/dL 8 5   < > 8 8   ALBUMIN g/dL  --   --  3 6   TOTAL BILIRUBIN mg/dL  --   --  0 70   ALK PHOS U/L  --   --  91   ALT U/L  --   --  36   AST U/L  --   --  22   GLUCOSE RANDOM mg/dL 126   < > 106    < > = values in this interval not displayed  Lines/Drains:  Invasive Devices  Report    Peripheral Intravenous Line  Duration           Peripheral IV 09/24/22 Left;Proximal;Ventral (anterior) Forearm <1 day              Imaging: No pertinent imaging reviewed      Recent Cultures (last 7 days):         Last 24 Hours Medication List:   Current Facility-Administered Medications   Medication Dose Route Frequency Provider Last Rate    acetaminophen  650 mg Oral Q6H PRN Otoniel Ríos PA-C      DULoxetine  60 mg Oral Daily Emmanuelle Vecellio PA-C      enoxaparin  40 mg Subcutaneous Daily Emmanuelle Vecellio, PA-C      HYDROmorphone  0 5 mg Intravenous Q3H PRN Emmanuelle Vecellio, PA-C      iohexol  50 mL Oral Once in imaging [place] [facility]      methylPREDNISolone sodium succinate  20 mg Intravenous [facility] Lizett Chung MD      metroNIDAZOLE  500 mg Oral [facility] Emmanuelle Dodge PA-C      ondansetron  4 mg Intravenous Q6H PRN Nico Hardin PA-C      pantoprazole  40 mg Intravenous Q12H [address] Emmanuelle SELENA Dodge      saccharomyces boulardii  250 mg Oral BID Nico Hardin PA-C      sodium chloride  125 mL/hr Intravenous Continuous Emmanuelle CARI DodgeC 125 mL/hr (09/25/22 1305)        Today, Patient Was Seen By: Jethro Krishnan DO    **Please Note: This note may have been constructed using a voice recognition system  **

## 2022-09-25 NOTE — PLAN OF CARE
Problem: Nutrition/Hydration-ADULT  Goal: Nutrient/Hydration intake appropriate for improving, restoring or maintaining nutritional needs  Description: Monitor and assess patient's nutrition/hydration status for malnutrition  Collaborate with interdisciplinary team and initiate plan and interventions as ordered  Monitor patient's weight and dietary intake as ordered or per policy  Utilize nutrition screening tool and intervene as necessary  Determine patient's food preferences and provide high-protein, high-caloric foods as appropriate       INTERVENTIONS:  - Monitor oral intake, urinary output, labs, and treatment plans  - Assess nutrition and hydration status and recommend course of action  - Evaluate amount of meals eaten  - Assist patient with eating if necessary   - Allow adequate time for meals  - Recommend/ encourage appropriate diets, oral nutritional supplements, and vitamin/mineral supplements  - Order, calculate, and assess calorie counts as needed  - Assess need for intravenous fluids  - Provide specific nutrition/hydration education as appropriate  - Include patient/family/caregiver in decisions related to nutrition  Outcome: Progressing     Problem: PAIN - ADULT  Goal: Verbalizes/displays adequate comfort level or baseline comfort level  Description: Interventions:  - Encourage patient to monitor pain and request assistance  - Assess pain using appropriate pain scale  - Administer analgesics based on type and severity of pain and evaluate response  - Implement non-pharmacological measures as appropriate and evaluate response  - Consider cultural and social influences on pain and pain management  - Notify physician/advanced practitioner if interventions unsuccessful or patient reports new pain  Outcome: Progressing     Problem: INFECTION - ADULT  Goal: Absence or prevention of progression during hospitalization  Description: INTERVENTIONS:  - Assess and monitor for signs and symptoms of infection  - Monitor lab/diagnostic results  - [place] appropriate cooling/warming therapies per order  - Administer medications as ordered  - Instruct and encourage patient and family to use good hand hygiene technique  - Identify and instruct in appropriate isolation precautions for identified infection/condition  Outcome: Progressing     Problem: DISCHARGE PLANNING  Goal: Discharge to home or other facility with appropriate resources  Description: INTERVENTIONS:  - Identify barriers to discharge w/patient and caregiver  - Arrange for needed discharge resources and transportation as appropriate  - Identify discharge learning needs (meds, wound care, etc )  - Arrange for interpretive services to assist at discharge as needed  - Refer to Case Management Department for coordinating discharge planning if the patient needs post-hospital services based on physician/advanced practitioner order or complex needs related to functional status, cognitive ability, or social support system  Outcome: Progressing     Problem: Knowledge Deficit  Goal: Patient/family/caregiver demonstrates understanding of disease process, treatment plan, medications, and discharge instructions  Description: Complete learning assessment and assess knowledge base    Interventions:  - Provide teaching at level of understanding  - Provide teaching via preferred learning methods  Outcome: Progressing     Problem: GASTROINTESTINAL - ADULT  Goal: Minimal or absence of nausea and/or vomiting  Description: INTERVENTIONS:  - Administer IV fluids if ordered to ensure adequate hydration  - Administer ordered antiemetic medications as needed  - Provide nonpharmacologic comfort measures as appropriate  - Advance diet as tolerated, if ordered  - Consider nutrition services referral to assist patient with adequate nutrition and appropriate food choices  Outcome: Progressing  Goal: Maintains or returns to baseline bowel function  Description: INTERVENTIONS:  - Assess bowel function  - Encourage oral fluids to ensure adequate hydration  - Administer IV fluids if ordered to ensure adequate hydration  - Administer ordered medications as needed  - Encourage mobilization and activity  - Consider nutritional services referral to assist patient with adequate nutrition and appropriate food choices  Outcome: Progressing  Goal: Maintains adequate nutritional intake  Description: INTERVENTIONS:  - Monitor percentage of each meal consumed  - Identify factors contributing to decreased intake, treat as appropriate  - Assist with meals as needed  - Monitor I&O, weight, and lab values if indicated  - Obtain nutrition services referral as needed  Outcome: Progressing

## 2022-09-25 NOTE — ASSESSMENT & PLAN NOTE
Pt admitted last month with similar complaints  · Continue Flagyl  · IV steroids as noted above   · Appreciate recommendations from GI

## 2022-09-25 NOTE — PROGRESS NOTES
Progress Note -  [facility]  [facility] 32 y o  male MRN: [id number]  Unit/Bed#: [address] [place] [address] Encounter: [id number]      ASSESSMENT AND PLAN:  32year-old male with history of UC s/p total proctocolectomy with ileoanal pouch anastomosis c/b pouchitis and anastomotic stricture requiring dilation who presents for abdominal pain with nausea and vomiting      He has recurrent admissions for abdominal pain with question of pouchitis based on CT  He has trialed antibiotics without improvement and had a flexible sigmoidoscopy on 8/24 demonstrated ulceration in the proximal aspect of the ileal pouch with biopsies which showed chronic inflammation with villous blunting and architectural distortion  His most recent CT showed acute segmental inflammation involving the R pelvic small bowel proximal to the pouch and he was started on IV solumedrol with improvement of his symptoms  He is also followed by colorectal surgery at OSH who is considering revision of J pouch next year       Unclear if his symptoms are due to chronic/refractory pouchitis vs  ?Crohn's disease given proximal involvement and pathology suggestive of chronic inflammation in the context of elevated CRP  Will continue IV steroids and send pre-biologic labs in the event he needs escalation of therapy  Will continue to follow and thank you for the opportunity to consult in his care     - Solumedrol 20 mg TID, ideally for 3 days   - Flagyl 500 mg TID  - Follow up Cdiff PCR, stool enteric panel, and O&P  - HBV serologies, TMPT enzyme activity and quant gold   - Trend CRP   If downtrending, can consider transition to PO steroids to facilitate discharge   - Consider CT or MR enterography to evaluate extent of disease      MD Chayito Hassan MD  ______________________________________________________________________    Subjective:  -Reports improvement of abdominal pain and diarrhea with IV steroids     INPATIENT MEDICATIONS: Medications Prior to Admission   Medication    DULoxetine (CYMBALTA) 60 mg delayed release capsule    metroNIDAZOLE (FLAGYL) 500 mg tablet    oxyCODONE-acetaminophen (Percocet) 5-325 mg per tablet    Florastor 250 MG capsule    saccharomyces boulardii (FLORASTOR) 250 mg capsule    SULFASALAZINE PO       Objective   /86 (BP Location: Right arm)   Pulse 77   Temp 98 7 °F (37 1 °C) (Oral)   Resp 17   Ht 5' 9" (1 753 m)   Wt 82 6 kg (182 lb)   SpO2 97%   BMI 26 88 kg/m²     PHYSICAL EXAM:    General: No acute distress  HEENT: No scleral icterus, normocephalic  Abdomen: Soft, non-tender, non-distended, normoactive bowel sounds, no masses, no hepatomegaly  Extremities: No lower extremity edema  Skin: No jaundice  Neuro: Awake, alert, oriented x 3    Lab Results:   Lab Results   Component Value Date    WBC 8 12 09/22/2022    HGB 10 4 (L) 09/22/2022    HCT 34 9 (L) 09/22/2022    MCV 64 (L) 09/22/2022     09/22/2022     Lab Results   Component Value Date     10/31/2015    SODIUM 140 09/25/2022    K 4 1 09/25/2022     09/25/2022    CO2 28 09/25/2022    ANIONGAP 10 0 10/31/2015    AGAP 7 09/25/2022    BUN 6 09/25/2022    CREATININE 0 92 09/25/2022    GLUC 126 09/25/2022    GLUF 79 09/21/2022    CALCIUM 8 5 09/25/2022    AST 22 09/20/2022    ALT 36 09/20/2022    ALKPHOS 91 09/20/2022    PROT 8 1 10/30/2015    TP 7 5 09/20/2022    BILITOT 0 6 10/30/2015    TBILI 0 70 09/20/2022    EGFR 111 09/25/2022     Lab Results   Component Value Date    INR 1 00 07/06/2022    INR 0 99 06/05/2022    INR 0 94 03/06/2022    PROTIME 13 0 07/06/2022    PROTIME 12 9 06/05/2022    PROTIME 12 4 03/06/2022     Lab Results   Component Value Date    IRON 17 (L) 02/25/2022    TIBC 270 02/25/2022    FERRITIN 257 02/25/2022       Imaging Studies: I have personally reviewed pertinent imaging studies      XR abdomen obstruction series    Result Date: 9/20/2022  Impression: Persistent gaseous distention of the ileal pouch with some mildly dilated air-filled bowel loops in the mid abdomen although overall improved compared to  image from recent CT examination dated 9/19/2022 ? Reactive ileus in the setting of distal ileal pouch inflammation as seen and described on prior CT  Continued follow-up recommended  Workstation performed: [id number]     CT abdomen pelvis w contrast    Result Date: 9/23/2022  Impression: Acute segmental inflammation involving the right pelvic small bowel segment proximal to the rectal pouch  Minimal inflammatory fluid  Unchanged 2 enlarged lymph nodes in the pelvis and lower abdominal mesentery   Workstation performed: [id number]

## 2022-09-25 NOTE — PLAN OF CARE
Problem: Nutrition/Hydration-ADULT  Goal: Nutrient/Hydration intake appropriate for improving, restoring or maintaining nutritional needs  Description: Monitor and assess patient's nutrition/hydration status for malnutrition  Collaborate with interdisciplinary team and initiate plan and interventions as ordered  Monitor patient's weight and dietary intake as ordered or per policy  Utilize nutrition screening tool and intervene as necessary  Determine patient's food preferences and provide high-protein, high-caloric foods as appropriate       INTERVENTIONS:  - Monitor oral intake, urinary output, labs, and treatment plans  - Assess nutrition and hydration status and recommend course of action  - Evaluate amount of meals eaten  - Assist patient with eating if necessary   - Allow adequate time for meals  - Recommend/ encourage appropriate diets, oral nutritional supplements, and vitamin/mineral supplements  - Order, calculate, and assess calorie counts as needed  - Assess need for intravenous fluids  - Provide specific nutrition/hydration education as appropriate  - Include patient/family/caregiver in decisions related to nutrition  Outcome: Progressing     Problem: PAIN - ADULT  Goal: Verbalizes/displays adequate comfort level or baseline comfort level  Description: Interventions:  - Encourage patient to monitor pain and request assistance  - Assess pain using appropriate pain scale  - Administer analgesics based on type and severity of pain and evaluate response  - Implement non-pharmacological measures as appropriate and evaluate response  - Consider cultural and social influences on pain and pain management  - Notify physician/advanced practitioner if interventions unsuccessful or patient reports new pain  Outcome: Progressing     Problem: INFECTION - ADULT  Goal: Absence or prevention of progression during hospitalization  Description: INTERVENTIONS:  - Assess and monitor for signs and symptoms of infection  - Monitor lab/diagnostic results  - [place] appropriate cooling/warming therapies per order  - Administer medications as ordered  - Instruct and encourage patient and family to use good hand hygiene technique  - Identify and instruct in appropriate isolation precautions for identified infection/condition  Outcome: Progressing     Problem: DISCHARGE PLANNING  Goal: Discharge to home or other facility with appropriate resources  Description: INTERVENTIONS:  - Identify barriers to discharge w/patient and caregiver  - Arrange for needed discharge resources and transportation as appropriate  - Identify discharge learning needs (meds, wound care, etc )  - Arrange for interpretive services to assist at discharge as needed  - Refer to Case Management Department for coordinating discharge planning if the patient needs post-hospital services based on physician/advanced practitioner order or complex needs related to functional status, cognitive ability, or social support system  Outcome: Progressing     Problem: Knowledge Deficit  Goal: Patient/family/caregiver demonstrates understanding of disease process, treatment plan, medications, and discharge instructions  Description: Complete learning assessment and assess knowledge base    Interventions:  - Provide teaching at level of understanding  - Provide teaching via preferred learning methods  Outcome: Progressing     Problem: GASTROINTESTINAL - ADULT  Goal: Minimal or absence of nausea and/or vomiting  Description: INTERVENTIONS:  - Administer IV fluids if ordered to ensure adequate hydration  - Administer ordered antiemetic medications as needed  - Provide nonpharmacologic comfort measures as appropriate  - Advance diet as tolerated, if ordered  - Consider nutrition services referral to assist patient with adequate nutrition and appropriate food choices  Outcome: Progressing  Goal: Maintains or returns to baseline bowel function  Description: INTERVENTIONS:  - Assess bowel function  - Encourage oral fluids to ensure adequate hydration  - Administer IV fluids if ordered to ensure adequate hydration  - Administer ordered medications as needed  - Encourage mobilization and activity  - Consider nutritional services referral to assist patient with adequate nutrition and appropriate food choices  Outcome: Progressing  Goal: Maintains adequate nutritional intake  Description: INTERVENTIONS:  - Monitor percentage of each meal consumed  - Identify factors contributing to decreased intake, treat as appropriate  - Assist with meals as needed  - Monitor I&O, weight, and lab values if indicated  - Obtain nutrition services referral as needed  Outcome: Progressing

## 2022-09-26 VITALS
OXYGEN SATURATION: 97 % | HEART RATE: 58 BPM | RESPIRATION RATE: 18 BRPM | HEIGHT: 69 IN | BODY MASS INDEX: 26.96 KG/M2 | WEIGHT: 182 LBS | SYSTOLIC BLOOD PRESSURE: 165 MMHG | TEMPERATURE: 98.5 F | DIASTOLIC BLOOD PRESSURE: 89 MMHG

## 2022-09-26 LAB
ANION GAP SERPL CALCULATED.3IONS-SCNC: 7 MMOL/L (ref 4–13)
BUN SERPL-MCNC: 8 MG/DL (ref 5–25)
C DIFF TOX GENS STL QL NAA+PROBE: NEGATIVE
CALCIUM SERPL-MCNC: 8.5 MG/DL (ref 8.3–10.1)
CAMPYLOBACTER DNA SPEC NAA+PROBE: NORMAL
CHLORIDE SERPL-SCNC: 104 MMOL/L (ref 96–108)
CO2 SERPL-SCNC: 27 MMOL/L (ref 21–32)
CREAT SERPL-MCNC: 1.05 MG/DL (ref 0.6–1.3)
CRP SERPL QL: 7.2 MG/L
GFR SERPL CREATININE-BSD FRML MDRD: 95 ML/MIN/1.73SQ M
GLUCOSE SERPL-MCNC: 169 MG/DL (ref 65–140)
POTASSIUM SERPL-SCNC: 3.7 MMOL/L (ref 3.5–5.3)
SALMONELLA DNA SPEC QL NAA+PROBE: NORMAL
SHIGA TOXIN STX GENE SPEC NAA+PROBE: NORMAL
SHIGELLA DNA SPEC QL NAA+PROBE: NORMAL
SODIUM SERPL-SCNC: 138 MMOL/L (ref 135–147)

## 2022-09-26 PROCEDURE — 99239 HOSP IP/OBS DSCHRG MGMT >30: CPT | Performed by: FAMILY MEDICINE

## 2022-09-26 PROCEDURE — 80048 BASIC METABOLIC PNL TOTAL CA: CPT | Performed by: FAMILY MEDICINE

## 2022-09-26 PROCEDURE — C9113 INJ PANTOPRAZOLE SODIUM, VIA: HCPCS

## 2022-09-26 PROCEDURE — 86140 C-REACTIVE PROTEIN: CPT | Performed by: FAMILY MEDICINE

## 2022-09-26 PROCEDURE — 99232 SBSQ HOSP IP/OBS MODERATE 35: CPT | Performed by: INTERNAL MEDICINE

## 2022-09-26 RX ORDER — PANTOPRAZOLE SODIUM 40 MG/1
40 TABLET, DELAYED RELEASE ORAL 2 TIMES DAILY
Qty: 60 TABLET | Refills: 0 | Status: SHIPPED | OUTPATIENT
Start: 2022-09-26 | End: 2022-10-26

## 2022-09-26 RX ORDER — PREDNISONE 10 MG/1
TABLET ORAL
Qty: 70 TABLET | Refills: 0 | Status: ON HOLD | OUTPATIENT
Start: 2022-09-27 | End: 2022-10-07 | Stop reason: SDUPTHER

## 2022-09-26 RX ADMIN — HYDROMORPHONE HYDROCHLORIDE 0.5 MG: 1 INJECTION, SOLUTION INTRAMUSCULAR; INTRAVENOUS; SUBCUTANEOUS at 18:40

## 2022-09-26 RX ADMIN — HYDROMORPHONE HYDROCHLORIDE 0.5 MG: 1 INJECTION, SOLUTION INTRAMUSCULAR; INTRAVENOUS; SUBCUTANEOUS at 11:56

## 2022-09-26 RX ADMIN — METHYLPREDNISOLONE SODIUM SUCCINATE 20 MG: 40 INJECTION, POWDER, FOR SOLUTION INTRAMUSCULAR; INTRAVENOUS at 05:11

## 2022-09-26 RX ADMIN — Medication 250 MG: at 08:56

## 2022-09-26 RX ADMIN — METRONIDAZOLE 500 MG: 500 TABLET ORAL at 05:11

## 2022-09-26 RX ADMIN — HYDROMORPHONE HYDROCHLORIDE 0.5 MG: 1 INJECTION, SOLUTION INTRAMUSCULAR; INTRAVENOUS; SUBCUTANEOUS at 05:03

## 2022-09-26 RX ADMIN — METRONIDAZOLE 500 MG: 500 TABLET ORAL at 13:04

## 2022-09-26 RX ADMIN — SODIUM CHLORIDE 125 ML/HR: 0.9 INJECTION, SOLUTION INTRAVENOUS at 13:04

## 2022-09-26 RX ADMIN — DULOXETINE HYDROCHLORIDE 60 MG: 60 CAPSULE, DELAYED RELEASE ORAL at 08:56

## 2022-09-26 RX ADMIN — HYDROMORPHONE HYDROCHLORIDE 0.5 MG: 1 INJECTION, SOLUTION INTRAMUSCULAR; INTRAVENOUS; SUBCUTANEOUS at 15:12

## 2022-09-26 RX ADMIN — ENOXAPARIN SODIUM 40 MG: 40 INJECTION SUBCUTANEOUS at 08:55

## 2022-09-26 RX ADMIN — PANTOPRAZOLE SODIUM 40 MG: 40 INJECTION, POWDER, FOR SOLUTION INTRAVENOUS at 08:56

## 2022-09-26 RX ADMIN — HYDROMORPHONE HYDROCHLORIDE 0.5 MG: 1 INJECTION, SOLUTION INTRAMUSCULAR; INTRAVENOUS; SUBCUTANEOUS at 08:55

## 2022-09-26 RX ADMIN — METHYLPREDNISOLONE SODIUM SUCCINATE 20 MG: 40 INJECTION, POWDER, FOR SOLUTION INTRAMUSCULAR; INTRAVENOUS at 13:04

## 2022-09-26 RX ADMIN — Medication 250 MG: at 17:41

## 2022-09-26 RX ADMIN — ONDANSETRON 4 MG: 2 INJECTION INTRAMUSCULAR; INTRAVENOUS at 15:12

## 2022-09-26 RX ADMIN — HYDROMORPHONE HYDROCHLORIDE 0.5 MG: 1 INJECTION, SOLUTION INTRAMUSCULAR; INTRAVENOUS; SUBCUTANEOUS at 01:36

## 2022-09-26 RX ADMIN — ONDANSETRON 4 MG: 2 INJECTION INTRAMUSCULAR; INTRAVENOUS at 01:36

## 2022-09-26 NOTE — PROGRESS NOTES
Progress Note - [facility]  Tc Fitting 32 y o  male MRN: [id number]    Unit/Bed#: [address] [place] [address] Encounter: [id number]      Assessment/Plan:  68-year-old male with past medical history of ulcerative colitis status post total proctocolectomy with J pouch ileal anal anastomosis, anastomotic stricture, persistent inflammation, question of pouchitis who presents to [place] [address] on 09/20 with report of abdominal pain        1  Abdominal pain associated with nausea and vomiting  Patient reported abdominal pain which increases in severity with onset on 9/19 at 2:00 a m     Patient also reported associated symptoms of nausea and vomiting   CT abdomen and pelvis with contrast done 9/19 showed fluid in the ileal pouch and minimal thickening of the distal ileum may represent nonspecific distal ileal-pouch itis in the appropriate clinical content   No perienteric inflammatory changes or abscess  X-ray abdominal obstructive series done 9/20 showed persistent gaseous distention of the ileum out with some mildly dilated air-filled bowel loops in the mid abdomen all through overall improved compared to  image from recent to CT exam dated 09/19/2022  Questionable reactive ileus in the setting of distal ileal pouch inflammation as seen and described on prior CT   Continues with generalized abdominal pain and tenderness with palpation  Tolerating diet  Unclear if his symptoms are due to chronic/refractory pouchitis vs   Questionable Crohn's disease given proximal involvement and pathology suggestive of chronic inflammation in the context of elevated CRP  -Low-fiber/low residue diet  -Continue Solu-Medrol 20 mg t i d  and  titrating doses of steroids at discharge starting at 40 mg daily and decrease by 10 mg every 7 days until completed    -Continue supportive care   -Pain management per attending  -Continue antiemetics as needed for nausea or vomiting   -Continue Flagyl  -Continue pantoprazole b i d   -Patient is having a revision of J pouch done by surgeon in [place] on January 11, 2023     -HBV serologies, TMPT enzyme activity and quant gold pending   -Follow-up with Dr Danuta Grande inflammatory bowel disease physician as outpatient  Subjective:   Lying in bed in no acute distress  Continues with tenderness throughout abdomen and lower abdominal pain  Patient reports that his abdominal pain has improved  Tolerating diet  No nausea or vomiting  Objective:     Vitals: Blood pressure 126/73, pulse 58, temperature 97 9 °F (36 6 °C), temperature source Oral, resp  rate 18, height 5' 9" (1 753 m), weight 82 6 kg (182 lb), SpO2 95 %  ,Body mass index is 26 88 kg/m²  Intake/Output Summary (Last 24 hours) at 9/26/2022 1238  Last data filed at 9/26/2022 0900  Gross per 24 hour   Intake 3135 ml   Output 1400 ml   Net 1735 ml       Physical Exam: Physical Exam  Vitals and nursing note reviewed  Constitutional:       General: He is not in acute distress  Cardiovascular:      Rate and Rhythm: Normal rate and regular rhythm  Pulses: Normal pulses  Heart sounds: Normal heart sounds  Pulmonary:      Effort: Pulmonary effort is normal  No respiratory distress  Breath sounds: Normal breath sounds  No stridor  No wheezing, rhonchi or rales  Abdominal:      General: Bowel sounds are normal  There is no distension  Palpations: Abdomen is soft  There is no mass  Tenderness: There is abdominal tenderness  There is no guarding or rebound  Hernia: No hernia is present  Musculoskeletal:      Right lower leg: No edema  Left lower leg: No edema  Skin:     General: Skin is warm and dry  Capillary Refill: Capillary refill takes less than 2 seconds  Coloration: Skin is not jaundiced or pale  Neurological:      Mental Status: He is alert and oriented to person, place, and time     Psychiatric:         Mood and Affect: Mood normal          Invasive Devices  Report Peripheral Intravenous Line  Duration           Peripheral IV 09/24/22 Left;Proximal;Ventral (anterior) Forearm 1 day                Current Facility-Administered Medications:     acetaminophen (TYLENOL) tablet 650 mg, 650 mg, Oral, Q6H PRN, Emmanuelle Vecellio, PA-C    DULoxetine (CYMBALTA) delayed release capsule 60 mg, 60 mg, Oral, Daily, Emmanuelle Vecellio, PA-C, 60 mg at 09/26/22 0856    enoxaparin (LOVENOX) subcutaneous injection 40 mg, 40 mg, Subcutaneous, Daily, Emmanuelle Vecellio, PA-C, 40 mg at 09/26/22 0855    HYDROmorphone (DILAUDID) injection 0 5 mg, 0 5 mg, Intravenous, Q3H PRN, Emmanuelle Vecellio, PA-C, 0 5 mg at 09/26/22 1156    iohexol (OMNIPAQUE) 240 MG/ML solution 50 mL, 50 mL, Oral, Once in imaging, [place] Schulz    methylPREDNISolone sodium succinate (Solu-MEDROL) injection 20 mg, 20 mg, Intravenous, Q8H [address], Phyllis Tristan MD, 20 mg at 09/26/22 0511    metroNIDAZOLE (FLAGYL) tablet 500 mg, 500 mg, Oral, Q8H [address], Emmanuelle Vecellio, PA-C, 500 mg at 09/26/22 0511    ondansetron (ZOFRAN) injection 4 mg, 4 mg, Intravenous, Q6H PRN, Emmanuelle Vecellio, PA-C, 4 mg at 09/26/22 0136    pantoprazole (PROTONIX) injection 40 mg, 40 mg, Intravenous, Q12H [address], Emmanuelle Vecellio, PA-C, 40 mg at 09/26/22 0856    saccharomyces boulardii (FLORASTOR) capsule 250 mg, 250 mg, Oral, BID, Emmanuelle Vecellio, PA-C, 250 mg at 09/26/22 0856    sodium chloride 0 9 % infusion, 125 mL/hr, Intravenous, Continuous, Emmanuelle Vecellio, PA-C, Last Rate: 125 mL/hr at 09/25/22 2034, 125 mL/hr at 09/25/22 2034    Lab Results:   Recent Results (from the past 24 hour(s))   Basic metabolic panel    Collection Time: 09/26/22  5:09 AM   Result Value Ref Range    Sodium 138 135 - 147 mmol/L    Potassium 3 7 3 5 - 5 3 mmol/L    Chloride 104 96 - 108 mmol/L    CO2 27 21 - 32 mmol/L    ANION GAP 7 4 - 13 mmol/L    BUN 8 5 - 25 mg/dL    Creatinine 1 05 0 60 - 1 30 mg/dL    Glucose 169 (H) 65 - 140 mg/dL    Calcium 8 5 8 3 - 10 1 mg/dL eGFR 95 ml/min/1 73sq m   C-reactive protein    Collection Time: 09/26/22  5:09 AM   Result Value Ref Range    CRP 7 2 (H) <3 0 mg/L             Imaging Studies: XR abdomen obstruction series    Result Date: 9/20/2022  Narrative: OBSTRUCTION SERIES INDICATION:   Upper abdominal pain or vomiting  COMPARISON:  9/19/2022 EXAM PERFORMED/VIEWS:  XR ABDOMEN OBSTRUCTION SERIES FINDINGS: Surgical chain sutures again noted in the left hemiabdomen and pelvis related to patient's total proctocolectomy with ileal pouch-anal anastomosis  Persistent gaseous distention of the ileal pouch noted with some mildly dilated air-filled bowel loops in  the mid abdomen  No free air beneath the hemidiaphragms  No pathologic calcifications or soft tissue masses evident  Osseous structures are unremarkable  Examination of the chest reveals a normal cardiomediastinal silhouette  Lungs are clear  Impression: Persistent gaseous distention of the ileal pouch with some mildly dilated air-filled bowel loops in the mid abdomen although overall improved compared to  image from recent CT examination dated 9/19/2022 ? Reactive ileus in the setting of distal ileal pouch inflammation as seen and described on prior CT  Continued follow-up recommended  Workstation performed: [id number]     CT abdomen pelvis w contrast    Result Date: 9/23/2022  Narrative: CT ABDOMEN AND PELVIS WITH IV CONTRAST INDICATION:   Abdominal pain, acute, nonlocalized Abdomen bloating and pain, pouchitis , ? ileus  COMPARISON:  Compared with 9/19/2022 TECHNIQUE:  CT examination of the abdomen and pelvis was performed  Axial, sagittal, and coronal 2D reformatted images were created from the source data and submitted for interpretation  Radiation dose length product (DLP) for this visit:  479 32 mGy-cm     This examination, like all CT scans performed in the [facility], was performed utilizing techniques to minimize radiation dose exposure, including the use of iterative  reconstruction and automated exposure control  IV Contrast:  100 mL of iohexol (OMNIPAQUE) Enteric Contrast:  Enteric contrast was not administered  FINDINGS: ABDOMEN LOWER CHEST:  No clinically significant abnormality identified in the visualized lower chest  LIVER/BILIARY TREE:  Unremarkable  GALLBLADDER:  No calcified gallstones  No pericholecystic inflammatory change  SPLEEN:  Unremarkable  PANCREAS:  Unremarkable  ADRENAL GLANDS:  Unremarkable  KIDNEYS/URETERS:  Unremarkable  No hydronephrosis  STOMACH AND BOWEL:  Prior total colectomy  Distended rectal pouch with sutures  New circumferential wall thickening involving the ileal segment in the right pelvis proximal to the rectal pouch  APPENDIX:  No findings to suggest appendicitis  ABDOMINOPELVIC CAVITY:  No ascites  No pneumoperitoneum  Presacral lymph node in series 2 image 58 measuring 1 1 cm compared to 1 4 cm  Right pelvic lymph node measuring 2 4 x 1 3 cm compared to 2 0 x 1 8 cm  Lower abdominal mesenteric lymph nodes unchanged VESSELS:  Unremarkable for patient's age  PELVIS REPRODUCTIVE ORGANS:  Unremarkable for patient's age  URINARY BLADDER:  Unremarkable  ABDOMINAL WALL/INGUINAL REGIONS:  Unremarkable  OSSEOUS STRUCTURES:  No acute fracture or destructive osseous lesion  Impression: Acute segmental inflammation involving the right pelvic small bowel segment proximal to the rectal pouch  Minimal inflammatory fluid  Unchanged 2 enlarged lymph nodes in the pelvis and lower abdominal mesentery  Workstation performed: [id number]     CT abdomen pelvis with contrast    Result Date: 9/19/2022  Narrative: CT ABDOMEN AND PELVIS WITH IV CONTRAST INDICATION:   Abdominal pain, acute, nonlocalized severe persistent abdominal pain with N/V, patient concerned about obstruction  COMPARISON:  CT abdomen and pelvis 8/21/2022 TECHNIQUE:  CT examination of the abdomen and pelvis was performed   Axial, sagittal, and coronal 2D reformatted images were created from the source data and submitted for interpretation  Radiation dose length product (DLP) for this visit:  493 mGy-cm   This examination, like all CT scans performed in the [place], was performed utilizing techniques to minimize radiation dose exposure, including the use of iterative reconstruction and automated exposure control  IV Contrast:  85 mL of iohexol (OMNIPAQUE)  350 Enteric Contrast:  Enteric contrast was administered  FINDINGS: ABDOMEN LOWER CHEST:  Minimal enteric contrast in the distal esophagus may be sequela of gastroesophageal reflux  LIVER/BILIARY TREE:  Unremarkable  GALLBLADDER:  No calcified gallstones  No pericholecystic inflammatory change  SPLEEN:  Mild splenomegaly measuring 14 5 cm in transverse diameter  PANCREAS:  Unremarkable  ADRENAL GLANDS:  Unremarkable  KIDNEYS/URETERS:  Unremarkable  No hydronephrosis  STOMACH AND BOWEL:  Post total proctocolectomy with ileal pouch - anal anastomosis  Mild gas fluid distention of the ileal pouch without significant wall thickening  Moderate gaseous distention of distal ileum proximal to the pouch with minimal wall thickening  Normal caliber proximal to mid small bowel  APPENDIX:  Post appendectomy  ABDOMINOPELVIC CAVITY:  No ascites  No pneumoperitoneum  Interval enlargement of few mesenteric lymph nodes; lymph nodes will be measured in short axis on series 2: Image 51, posterior mesentery, 1 1 cm previously 7 mm  Image 54, posterior mesentery, 1 1 cm previously 7 mm  Image 66, right external iliac, 1 6 cm previously 1 2 cm  VESSELS:  Unremarkable for patient's age  PELVIS REPRODUCTIVE ORGANS:  Unremarkable for patient's age  URINARY BLADDER:  Unremarkable  ABDOMINAL WALL/INGUINAL REGIONS:  Subcentimeter ventral umbilical abdominal wall diastases containing fat  No bowel herniation  No inguinal hernia or mass  OSSEOUS STRUCTURES:  No acute fracture or destructive osseous lesion   Small Schmorl's nodes in the lower thoracic spine  Mild stable bilateral sacroiliitis and right pubic sclerosis  Impression: Fluid in the ileal pouch and minimal thickening of distal ileum may represent nonspecific distal ileal-pouchitis in the appropriate clinical context  No perienteric inflammatory changes or abscess  Interval enlargement of few mesenteric lymph nodes and mild new splenomegaly  CT abdomen and pelvis follow-up in 3 months is advised in addition to clinical evaluation  No other significant interval change  This study demonstrates a significant  finding and was documented as such in [facility] for liaison and referring practitioner notification  Workstation performed: [id number]           [place] Siegel      Please Note: "This note has been constructed using a voice recognition system  Therefore there may be syntax, spelling, and/or grammatical errors   Please call if you have any questions  "**

## 2022-09-26 NOTE — DISCHARGE SUMMARY
Julian 43  Discharge- Marletta Comfort 1995, 32 y o  male MRN: [id number]  Unit/Bed#: [address] [place] [address] Encounter: [id number]  Primary Care Provider: Jeff Santos DO   Date and time admitted to hospital: 9/20/2022  4:39 PM    * Intractable abdominal pain  Assessment & Plan  Patient presented with intractable epigastric pain  · History of ulcerative colitis status post total proctocolectomy with J-pouch ileoanal anastomosis, anastomotic stricture and persistent inflammation as well as questionable pouchitis and possible underlying Crohn's disease  · Patient states plan for surgical reconstruction January 11th  · Xray abdomen 9/20 - Persistent gaseous distention of the ileal pouch with some mildly dilated air-filled bowel loops in the mid abdomen although overall improved compared to  image from recent CT examination dated 9/19/2022 ? Reactive ileus in the setting of distal ileal pouch inflammation as seen on prior CT   · CT scan 9/23 showed acute segmental inflammation of small bowel segment proximal to the rectal pouch  · 60 mg of IV Solu-Medrol x1 given on 09/23  Started on 20 mg of IV Solu-Medrol q 8 hours by GI on 09/24  · Per GI start on 40 mg prednisone on discharge which will be tapered down by 10 mg every week  · CRP elevated but trending down      · Required 4mg dilaudid in ED with minimal improvement of pain  · Continue flagyl daily  · Continue Prilosec b i d  on discharge  · Initially NPO --> advanced to low-fiber/low residue  · IVFs  · Follow-up with IBD specialist per GI recommendation on discharge    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[facility] )  Assessment & Plan  Pt admitted last month with similar complaints  · Continue Flagyl  · Steroids as noted above  · Appreciate recommendations from GI    [facility]ylosing spondyl[facility] ([facility] )  Assessment & Plan  Not on any medications    CAR (generalized anxiety disorder)  Assessment & Plan  Continue Cymbalta    History of Clostridium difficile infection-resolved as of 9/25/2022  Assessment & Plan  History of c diff  · Denies any diarrhea  · C diff, other stool studies ordered by GI    Medical Problems             Resolved Problems  Date Reviewed: 9/26/2022          Resolved    History of Clostridium difficile infection 9/25/2022     Resolved by  Alison Ibrahim DO              Discharging Physician / Practitioner: Alison Ibrahim DO  PCP: Ary Walter DO  Admission Date:   Admission Orders (From admission, onward)     Ordered        09/21/22 1641  Inpatient Admission  Once            09/20/22 2046  Place in Observation  Once                      Discharge Date: 09/26/22    Consultations During Hospital Stay:  · GI    Procedures Performed:   · None    Incidental Findings:   · None     Outpatient Tests Requested:  · None    Complications:  None    Reason for Admission:  Abdominal pain    Hospital Course:   Anselmo Pathak is a 32 y o  male patient who originally presented to the hospital on 9/20/2022 due to Abdominal pain  Patient with prior hospitalization for the same  Also reported an episode of vomiting prior to arrival   Reported decreased p o  intake at home  Patient was admitted and seen by GI while here  Started on IV steroids with improvement in symptoms  Tolerating diet and cleared by GI prior to discharge  Discharge plan discussed with patient    Please see above list of diagnoses and related plan for additional information  Condition at Discharge: stable    Discharge Day Visit / Exam:   Subjective:  Still with intermittent abdominal pain although significantly improved from before  Still with intermittent nausea but this has improved as well  Denies any vomiting and tolerating diet    Feels ready to get home    Vitals: Blood Pressure: 165/89 (09/26/22 1616)  Pulse: 58 (09/26/22 1616)  Temperature: 98 5 °F (36 9 °C) (09/26/22 1616)  Temp Source: Oral (09/26/22 0800)  Respirations: 18 (09/26/22 1616)  Height: 5' 9" (175 3 cm) (09/20/22 1638)  Weight - Scale: 82 6 kg (182 lb) (09/20/22 1638)  SpO2: 97 % (09/26/22 1616)  Exam:   Physical Exam  Vitals reviewed  Constitutional:       General: He is not in acute distress  Appearance: He is not ill-appearing  HENT:      Head: Normocephalic and atraumatic  Eyes:      General:         Right eye: No discharge  Left eye: No discharge  Extraocular Movements: Extraocular movements intact  Cardiovascular:      Rate and Rhythm: Normal rate and regular rhythm  Pulmonary:      Effort: Pulmonary effort is normal  No respiratory distress  Breath sounds: Normal breath sounds  No wheezing or rales  Abdominal:      General: Bowel sounds are normal  There is no distension  Palpations: Abdomen is soft  Tenderness: There is abdominal tenderness (Mild lower abdomen)  There is no guarding  Neurological:      Mental Status: He is alert and oriented to person, place, and time  Discharge instructions/Information to patient and family:   See after visit summary for information provided to patient and family  Provisions for Follow-Up Care:  See after visit summary for information related to follow-up care and any pertinent home health orders  Disposition:   Home    Planned Readmission: no     Discharge Statement:  I spent > 30 minutes discharging the patient  This time was spent on the day of discharge  I had direct contact with the patient on the day of discharge  Greater than 50% of the total time was spent examining patient, answering all patient questions, arranging and discussing plan of care with patient as well as directly providing post-discharge instructions  Additional time then spent on discharge activities  Discharge Medications:  See after visit summary for reconciled discharge medications provided to patient and/or family        **Please Note: This note may have been constructed using a voice recognition system**

## 2022-09-26 NOTE — NURSING NOTE
Patient discharged home  Written and verbal discharge instruction provided  Patient questions answered  IV removed per hospital protocol, occlusive dressing applied  Patient left unit ambulatory without an assistive device with all personal belongings

## 2022-09-26 NOTE — PLAN OF CARE
Problem: Nutrition/Hydration-ADULT  Goal: Nutrient/Hydration intake appropriate for improving, restoring or maintaining nutritional needs  Description: Monitor and assess patient's nutrition/hydration status for malnutrition  Collaborate with interdisciplinary team and initiate plan and interventions as ordered  Monitor patient's weight and dietary intake as ordered or per policy  Utilize nutrition screening tool and intervene as necessary  Determine patient's food preferences and provide high-protein, high-caloric foods as appropriate       INTERVENTIONS:  - Monitor oral intake, urinary output, labs, and treatment plans  - Assess nutrition and hydration status and recommend course of action  - Evaluate amount of meals eaten  - Assist patient with eating if necessary   - Allow adequate time for meals  - Recommend/ encourage appropriate diets, oral nutritional supplements, and vitamin/mineral supplements  - Order, calculate, and assess calorie counts as needed  - Assess need for intravenous fluids  - Provide specific nutrition/hydration education as appropriate  - Include patient/family/caregiver in decisions related to nutrition  Outcome: Progressing     Problem: INFECTION - ADULT  Goal: Absence or prevention of progression during hospitalization  Description: INTERVENTIONS:  - Assess and monitor for signs and symptoms of infection  - Monitor lab/diagnostic results  - [place] appropriate cooling/warming therapies per order  - Administer medications as ordered  - Instruct and encourage patient and family to use good hand hygiene technique  - Identify and instruct in appropriate isolation precautions for identified infection/condition  Outcome: Progressing     Problem: GASTROINTESTINAL - ADULT  Goal: Minimal or absence of nausea and/or vomiting  Description: INTERVENTIONS:  - Administer IV fluids if ordered to ensure adequate hydration  - Administer ordered antiemetic medications as needed  - Provide nonpharmacologic comfort measures as appropriate  - Advance diet as tolerated, if ordered  - Consider nutrition services referral to assist patient with adequate nutrition and appropriate food choices  Outcome: Progressing

## 2022-09-27 ENCOUNTER — TRANSITIONAL CARE MANAGEMENT (OUTPATIENT)
Dept: FAMILY MEDICINE CLINIC | Facility: CLINIC | Age: 27
End: 2022-09-27

## 2022-09-27 ENCOUNTER — TELEPHONE (OUTPATIENT)
Dept: FAMILY MEDICINE CLINIC | Facility: CLINIC | Age: 27
End: 2022-09-27

## 2022-09-27 LAB
GAMMA INTERFERON BACKGROUND BLD IA-ACNC: 0.02 IU/ML
M TB IFN-G BLD-IMP: NEGATIVE
M TB IFN-G CD4+ BCKGRND COR BLD-ACNC: 0 IU/ML
M TB IFN-G CD4+ BCKGRND COR BLD-ACNC: 0 IU/ML
MITOGEN IGNF BCKGRD COR BLD-ACNC: 1.52 IU/ML

## 2022-09-27 NOTE — ASSESSMENT & PLAN NOTE
Patient's heart rate went down to 38 bpm on telemetry monitor; has since improved.  Patient is asymptomatic. MD notified; no new orders. Will continue to monitor until end of shift.     Pt admitted last month with similar complaints  · Continue Flagyl  · Steroids as noted above  · Appreciate recommendations from GI

## 2022-09-27 NOTE — ASSESSMENT & PLAN NOTE
Patient presented with intractable epigastric pain  · History of ulcerative colitis status post total proctocolectomy with J-pouch ileoanal anastomosis, anastomotic stricture and persistent inflammation as well as questionable pouchitis and possible underlying Crohn's disease  · Patient states plan for surgical reconstruction January 11th  · Xray abdomen 9/20 - Persistent gaseous distention of the ileal pouch with some mildly dilated air-filled bowel loops in the mid abdomen although overall improved compared to  image from recent CT examination dated 9/19/2022 ? Reactive ileus in the setting of distal ileal pouch inflammation as seen on prior CT   · CT scan 9/23 showed acute segmental inflammation of small bowel segment proximal to the rectal pouch  · 60 mg of IV Solu-Medrol x1 given on 09/23  Started on 20 mg of IV Solu-Medrol q 8 hours by GI on 09/24  · Per GI start on 40 mg prednisone on discharge which will be tapered down by 10 mg every week  · CRP elevated but trending down      · Required 4mg dilaudid in ED with minimal improvement of pain  · Continue flagyl daily  · Continue Prilosec b i d  on discharge  · Initially NPO --> advanced to low-fiber/low residue  · IVFs  · Follow-up with IBD specialist per GI recommendation on discharge

## 2022-09-27 NOTE — TELEPHONE ENCOUNTER
----- Message from [facility], DO sent at 9/26/2022 11:41 PM EDT -----  Regarding: RE: Discharge  Thank you for allowing us to participate in the care of your patient, Mariajose Brothers, who was hospitalized from 9/20/2022 through 9/26/2022 with the admitting diagnosis of intractable abdominal pain  Patient started on IV Solu-Medrol and transitioned to prednisone taper on discharge  Patient to continue with his Flagyl and to follow-up with IBD specialist after discharge      If you have any additional questions or would like to discuss further, please feel free to contact me      [facility], [address] Internal Medicine, Hospitalist  [phone]

## 2022-09-28 ENCOUNTER — HOSPITAL ENCOUNTER (EMERGENCY)
Facility: HOSPITAL | Age: 27
Discharge: HOME | End: 2022-09-29
Attending: EMERGENCY MEDICINE
Payer: COMMERCIAL

## 2022-09-28 ENCOUNTER — APPOINTMENT (EMERGENCY)
Dept: RADIOLOGY | Facility: HOSPITAL | Age: 27
End: 2022-09-28
Payer: COMMERCIAL

## 2022-09-28 DIAGNOSIS — R10.9 ABDOMINAL PAIN: ICD-10-CM

## 2022-09-28 DIAGNOSIS — K52.9 COLITIS: Primary | ICD-10-CM

## 2022-09-28 DIAGNOSIS — R11.2 NAUSEA AND VOMITING: ICD-10-CM

## 2022-09-28 LAB
BASOPHILS # BLD AUTO: 0.08 THOUSANDS/ΜL (ref 0–0.1)
BASOPHILS NFR BLD AUTO: 1 % (ref 0–1)
EOSINOPHIL # BLD AUTO: 0.12 THOUSAND/ΜL (ref 0–0.61)
EOSINOPHIL NFR BLD AUTO: 1 % (ref 0–6)
ERYTHROCYTE [DISTWIDTH] IN BLOOD BY AUTOMATED COUNT: 18.6 % (ref 11.6–15.1)
HCT VFR BLD AUTO: 41.7 % (ref 36.5–49.3)
HGB BLD-MCNC: 12.5 G/DL (ref 12–17)
IMM GRANULOCYTES # BLD AUTO: 0.16 THOUSAND/UL (ref 0–0.2)
IMM GRANULOCYTES NFR BLD AUTO: 1 % (ref 0–2)
LYMPHOCYTES # BLD AUTO: 2.34 THOUSANDS/ΜL (ref 0.6–4.47)
LYMPHOCYTES NFR BLD AUTO: 18 % (ref 14–44)
MCH RBC QN AUTO: 18.9 PG (ref 26.8–34.3)
MCHC RBC AUTO-ENTMCNC: 30 G/DL (ref 31.4–37.4)
MCV RBC AUTO: 63 FL (ref 82–98)
MONOCYTES # BLD AUTO: 1.03 THOUSAND/ΜL (ref 0.17–1.22)
MONOCYTES NFR BLD AUTO: 8 % (ref 4–12)
NEUTROPHILS # BLD AUTO: 9.33 THOUSANDS/ΜL (ref 1.85–7.62)
NEUTS SEG NFR BLD AUTO: 71 % (ref 43–75)
NRBC BLD AUTO-RTO: 0 /100 WBCS
PLATELET # BLD AUTO: 564 THOUSANDS/UL (ref 149–390)
PMV BLD AUTO: 8.6 FL (ref 8.9–12.7)
RBC # BLD AUTO: 6.6 MILLION/UL (ref 3.88–5.62)
WBC # BLD AUTO: 13.06 THOUSAND/UL (ref 4.31–10.16)

## 2022-09-28 PROCEDURE — 96374 THER/PROPH/DIAG INJ IV PUSH: CPT

## 2022-09-28 PROCEDURE — 36415 COLL VENOUS BLD VENIPUNCTURE: CPT | Performed by: EMERGENCY MEDICINE

## 2022-09-28 PROCEDURE — 96361 HYDRATE IV INFUSION ADD-ON: CPT

## 2022-09-28 PROCEDURE — 99284 EMERGENCY DEPT VISIT MOD MDM: CPT

## 2022-09-28 PROCEDURE — 80053 COMPREHEN METABOLIC PANEL: CPT | Performed by: EMERGENCY MEDICINE

## 2022-09-28 PROCEDURE — 86900 BLOOD TYPING SEROLOGIC ABO: CPT | Performed by: EMERGENCY MEDICINE

## 2022-09-28 PROCEDURE — 96375 TX/PRO/DX INJ NEW DRUG ADDON: CPT

## 2022-09-28 PROCEDURE — 86901 BLOOD TYPING SEROLOGIC RH(D): CPT | Performed by: EMERGENCY MEDICINE

## 2022-09-28 PROCEDURE — 86850 RBC ANTIBODY SCREEN: CPT | Performed by: EMERGENCY MEDICINE

## 2022-09-28 PROCEDURE — 85025 COMPLETE CBC W/AUTO DIFF WBC: CPT | Performed by: EMERGENCY MEDICINE

## 2022-09-28 RX ORDER — HYDROMORPHONE HCL/PF 1 MG/ML
1 SYRINGE (ML) INJECTION ONCE
Status: COMPLETED | OUTPATIENT
Start: 2022-09-29 | End: 2022-09-28

## 2022-09-28 RX ORDER — ONDANSETRON 2 MG/ML
4 INJECTION INTRAMUSCULAR; INTRAVENOUS ONCE
Status: COMPLETED | OUTPATIENT
Start: 2022-09-28 | End: 2022-09-28

## 2022-09-28 RX ORDER — METHYLPREDNISOLONE SODIUM SUCCINATE 125 MG/2ML
125 INJECTION, POWDER, LYOPHILIZED, FOR SOLUTION INTRAMUSCULAR; INTRAVENOUS ONCE
Status: COMPLETED | OUTPATIENT
Start: 2022-09-29 | End: 2022-09-29

## 2022-09-28 RX ADMIN — ONDANSETRON 4 MG: 2 INJECTION INTRAMUSCULAR; INTRAVENOUS at 23:43

## 2022-09-28 RX ADMIN — HYDROMORPHONE HYDROCHLORIDE 1 MG: 1 INJECTION, SOLUTION INTRAMUSCULAR; INTRAVENOUS; SUBCUTANEOUS at 23:57

## 2022-09-28 RX ADMIN — METHYLPREDNISOLONE SODIUM SUCCINATE 125 MG: 125 INJECTION, POWDER, FOR SOLUTION INTRAMUSCULAR; INTRAVENOUS at 23:58

## 2022-09-28 RX ADMIN — SODIUM CHLORIDE 1000 ML: 0.9 INJECTION, SOLUTION INTRAVENOUS at 23:43

## 2022-09-28 NOTE — UTILIZATION REVIEW
Notification of Discharge   This is a Notification of Discharge from our facility [address]  Please be advised that this patient has been discharge from our facility  Below you will find the admission and discharge date and time including the patients disposition  UTILIZATION REVIEW CONTACT:  Jorge España  Utilization   Network Utilization Review Department  Phone: [phone] x carefully listen to the prompts  All voicemails are confidential   Email: [email or web address]     PHYSICIAN ADVISORY SERVICES:  FOR [id number] REVIEW - MEDICAL NECESSITY DENIAL  Phone: [phone]  Fax: [phone]  Email: [email or web address]     PRESENTATION DATE: 9/20/2022  4:39 PM  OBERVATION ADMISSION DATE:   INPATIENT ADMISSION DATE: 9/21/22  4:42 PM   DISCHARGE DATE: 9/26/2022  7:52 PM  DISPOSITION: Home/Self Care Home/Self Care      IMPORTANT INFORMATION:  Send all requests for admission clinical reviews, approved or denied determinations and any other requests to dedicated fax number below belonging to the campus where the patient is receiving treatment   List of dedicated fax numbers:  [address] [place] [address] DENIALS (Administrative/Medical Necessity) [phone]   [address]  (Maternity/NICU/Pediatrics) [phone]   Gloria Ying [phone]   [address] [phone]   [address] [place] [address] [phone]   2000 [facility] [address] [place] [address],[address] [phone]   [facility]  [phone]   [address] [facility]  [address] [phone]

## 2022-09-29 ENCOUNTER — APPOINTMENT (EMERGENCY)
Dept: RADIOLOGY | Facility: HOSPITAL | Age: 27
End: 2022-09-29
Payer: COMMERCIAL

## 2022-09-29 VITALS
OXYGEN SATURATION: 100 % | RESPIRATION RATE: 20 BRPM | WEIGHT: 182 LBS | SYSTOLIC BLOOD PRESSURE: 140 MMHG | HEART RATE: 87 BPM | BODY MASS INDEX: 26.88 KG/M2 | TEMPERATURE: 97.9 F | DIASTOLIC BLOOD PRESSURE: 86 MMHG

## 2022-09-29 LAB
ABO GROUP BLD: NORMAL
ALBUMIN SERPL BCP-MCNC: 3.5 G/DL (ref 3.5–5)
ALP SERPL-CCNC: 83 U/L (ref 46–116)
ALT SERPL W P-5'-P-CCNC: 31 U/L (ref 12–78)
ANION GAP SERPL CALCULATED.3IONS-SCNC: 7 MMOL/L (ref 4–13)
AST SERPL W P-5'-P-CCNC: 17 U/L (ref 5–45)
BILIRUB SERPL-MCNC: 0.45 MG/DL (ref 0.2–1)
BLD GP AB SCN SERPL QL: NEGATIVE
BUN SERPL-MCNC: 11 MG/DL (ref 5–25)
CALCIUM SERPL-MCNC: 9 MG/DL (ref 8.3–10.1)
CHLORIDE SERPL-SCNC: 104 MMOL/L (ref 96–108)
CO2 SERPL-SCNC: 31 MMOL/L (ref 21–32)
CREAT SERPL-MCNC: 1.11 MG/DL (ref 0.6–1.3)
G LAMBLIA AG STL QL IA: NEGATIVE
GFR SERPL CREATININE-BSD FRML MDRD: 89 ML/MIN/1.73SQ M
GLUCOSE SERPL-MCNC: 102 MG/DL (ref 65–140)
O+P STL CONC: NORMAL
POTASSIUM SERPL-SCNC: 3.1 MMOL/L (ref 3.5–5.3)
PROT SERPL-MCNC: 7.2 G/DL (ref 6.4–8.4)
RH BLD: POSITIVE
SODIUM SERPL-SCNC: 142 MMOL/L (ref 135–147)
SPECIMEN EXPIRATION DATE: NORMAL

## 2022-09-29 PROCEDURE — 96376 TX/PRO/DX INJ SAME DRUG ADON: CPT

## 2022-09-29 PROCEDURE — 74177 CT ABD & PELVIS W/CONTRAST: CPT

## 2022-09-29 PROCEDURE — 99285 EMERGENCY DEPT VISIT HI MDM: CPT | Performed by: EMERGENCY MEDICINE

## 2022-09-29 PROCEDURE — 96361 HYDRATE IV INFUSION ADD-ON: CPT

## 2022-09-29 PROCEDURE — G1004 CDSM NDSC: HCPCS

## 2022-09-29 RX ORDER — POTASSIUM CHLORIDE 20 MEQ/1
40 TABLET, EXTENDED RELEASE ORAL ONCE
Status: COMPLETED | OUTPATIENT
Start: 2022-09-29 | End: 2022-09-29

## 2022-09-29 RX ORDER — ONDANSETRON 2 MG/ML
4 INJECTION INTRAMUSCULAR; INTRAVENOUS ONCE
Status: COMPLETED | OUTPATIENT
Start: 2022-09-29 | End: 2022-09-29

## 2022-09-29 RX ORDER — ACETAMINOPHEN 325 MG/1
975 TABLET ORAL ONCE
Status: COMPLETED | OUTPATIENT
Start: 2022-09-29 | End: 2022-09-29

## 2022-09-29 RX ORDER — HYDROMORPHONE HCL/PF 1 MG/ML
1 SYRINGE (ML) INJECTION ONCE
Status: COMPLETED | OUTPATIENT
Start: 2022-09-29 | End: 2022-09-29

## 2022-09-29 RX ORDER — ONDANSETRON 4 MG/1
4 TABLET, FILM COATED ORAL EVERY 6 HOURS
Qty: 12 TABLET | Refills: 0 | Status: SHIPPED | OUTPATIENT
Start: 2022-09-29 | End: 2022-10-10

## 2022-09-29 RX ADMIN — POTASSIUM CHLORIDE 40 MEQ: 1500 TABLET, EXTENDED RELEASE ORAL at 02:13

## 2022-09-29 RX ADMIN — HYDROMORPHONE HYDROCHLORIDE 1 MG: 1 INJECTION, SOLUTION INTRAMUSCULAR; INTRAVENOUS; SUBCUTANEOUS at 01:11

## 2022-09-29 RX ADMIN — IOHEXOL 100 ML: 350 INJECTION, SOLUTION INTRAVENOUS at 00:28

## 2022-09-29 RX ADMIN — ONDANSETRON 4 MG: 2 INJECTION INTRAMUSCULAR; INTRAVENOUS at 01:03

## 2022-09-29 RX ADMIN — ACETAMINOPHEN 975 MG: 325 TABLET, FILM COATED ORAL at 02:29

## 2022-09-29 NOTE — ED PROVIDER NOTES
History  Chief Complaint   Patient presents with    Abdominal Pain     Patient c/o abdominal pain and rectal bleeding starting 1 hour prior to arrival, taking oral steroids after IV in hospital and pain is worsened     HPI  Patient is a 41-year-old male complex past medical history of inflammatory bowel disease, frequent presentations to emergency department for severe pain in the setting of ulcerative colitis flares presenting for evaluation of several hours of epigastric and lower abdominal sharp severe pain with associated nausea  Patient states that today at approximately 9 loose bowel movements and has had 3 frankly bloody bowel movements over the course of the past few hours  Patient states that he has had prior episodes of hematochezia in the setting of his ulcerative colitis  Patient denies anticoagulation or anti-platelet use  Patient currently stating that his pain is a 10/10  Patient has not taken anything for pain control at home  Patient denies fevers, chills, urinary changes  Prior to Admission Medications   Prescriptions Last Dose Informant Patient Reported? Taking? DULoxetine (CYMBALTA) 60 mg delayed release capsule   No No   Sig: Take 1 capsule (60 mg total) by mouth daily   metroNIDAZOLE (FLAGYL) 500 mg tablet   No No   Sig: Take 1 tablet (500 mg total) by mouth every 8 (eight) hours   oxyCODONE-acetaminophen (Percocet) 5-325 mg per tablet   No No   Sig: Take 1 tablet by mouth every 6 (six) hours as needed for severe pain Max Daily Amount: 4 tablets   pantoprazole (PROTONIX) 40 mg tablet   No No   Sig: Take 1 tablet (40 mg total) by mouth 2 (two) times a day   predniSONE 10 mg tablet   No No   Sig: Take 4 tablets (40 mg total) by mouth daily for 7 days, THEN 3 tablets (30 mg total) daily for 7 days, THEN 2 tablets (20 mg total) daily for 7 days, THEN 1 tablet (10 mg total) daily for 7 days     saccharomyces boulardii (FLORASTOR) 250 mg capsule   No No   Sig: Take 1 capsule (250 mg total) by mouth 2 (two) times a day      Facility-Administered Medications: None       Past Medical History:   Diagnosis Date    Ankylosing spondylitis ([facility] )     Anxiety     Bowel obstruction (HCC)     Clostridium difficile colitis 9/13/2018    Colitis     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[facility] ) 9/13/2018    Pancreatitis     Ulcerative colitis ([facility] )        Past Surgical History:   Procedure Laterality Date    COLECTOMY TOTAL      with ileal pouch and anastemosis    IR PICC PLACEMENT SINGLE LUMEN  3/1/2022    TOTAL COLECTOMY         History reviewed  No pertinent family history  I have reviewed and agree with the history as documented  E-Cigarette/Vaping    E-Cigarette Use Never User      E-Cigarette/Vaping Substances    Nicotine No     THC No     CBD No     Flavoring No     Other No     Unknown No      Social History     Tobacco Use    Smoking status: Never Smoker    Smokeless tobacco: Never Used   Vaping Use    Vaping Use: Never used   Substance Use Topics    Alcohol use: Not Currently     Comment: pt states 5 a month/socially    Drug use: No       Review of Systems   Constitutional: Negative for chills, fatigue and fever  HENT: Negative for congestion, rhinorrhea and sore throat  Eyes: Negative for photophobia and visual disturbance  Respiratory: Negative for chest tightness and shortness of breath  Cardiovascular: Negative for chest pain, palpitations and leg swelling  Gastrointestinal: Positive for abdominal pain, blood in stool, diarrhea and nausea  Negative for abdominal distention and vomiting  Endocrine: Negative for polydipsia and polyuria  Genitourinary: Negative for dysuria and hematuria  Musculoskeletal: Negative for arthralgias and myalgias  Skin: Negative for color change, pallor, rash and wound  Neurological: Negative for weakness, numbness and headaches  Psychiatric/Behavioral: Negative for confusion  Physical Exam  Physical Exam  Vitals and nursing note reviewed  Constitutional:       General: He is not in acute distress  Appearance: He is well-developed  He is not diaphoretic  Comments: Uncomfortable appearing but nontoxic nondistressed   HENT:      Head: Normocephalic and atraumatic  Comments: Moist mucous membranes     Right Ear: External ear normal       Left Ear: External ear normal       Nose: Nose normal       Mouth/Throat:      Pharynx: No oropharyngeal exudate  Eyes:      Conjunctiva/sclera: Conjunctivae normal       Pupils: Pupils are equal, round, and reactive to light  Cardiovascular:      Rate and Rhythm: Normal rate and regular rhythm  Heart sounds: Normal heart sounds  No murmur heard  No friction rub  No gallop  Pulmonary:      Effort: Pulmonary effort is normal  No respiratory distress  Breath sounds: Normal breath sounds  No wheezing  Chest:      Chest wall: No tenderness  Abdominal:      General: Bowel sounds are normal  There is no distension  Palpations: Abdomen is soft  There is no mass  Tenderness: There is abdominal tenderness  There is no guarding or rebound  Comments: Generalized abdominal tenderness without rigidity, rebound, guarding  Abdomen nondistended  Not actively vomiting  Musculoskeletal:         General: No deformity  Skin:     General: Skin is warm and dry  Capillary Refill: Capillary refill takes less than 2 seconds  Neurological:      Mental Status: He is alert and oriented to person, place, and time        Comments: AAO x4   Psychiatric:         Behavior: Behavior normal          Vital Signs  ED Triage Vitals [09/28/22 2311]   Temperature Pulse Respirations Blood Pressure SpO2   97 9 °F (36 6 °C) 98 20 135/97 100 %      Temp Source Heart Rate Source Patient Position - Orthostatic VS BP Location FiO2 (%)   Oral Monitor Sitting Right arm --      Pain Score       9           Vitals:    09/28/22 2311   BP: 135/97   Pulse: 98   Patient Position - Orthostatic VS: Sitting Visual Acuity      ED Medications  Medications   sodium chloride 0 9 % bolus 1,000 mL (1,000 mL Intravenous New Bag 9/28/22 2343)   HYDROmorphone (DILAUDID) injection 1 mg (has no administration in time range)   methylPREDNISolone sodium succinate (Solu-MEDROL) injection 125 mg (has no administration in time range)   ondansetron (ZOFRAN) injection 4 mg (4 mg Intravenous Given 9/28/22 2343)       Diagnostic Studies  Results Reviewed     Procedure Component Value Units Date/Time    CBC and differential [[id number]]  (Abnormal) Collected: 09/28/22 2334    Lab Status: Final result Specimen: Blood from Arm, Right Updated: 09/28/22 2341     WBC 13 06 Thousand/uL      RBC 6 60 Million/uL      Hemoglobin 12 5 g/dL      Hematocrit 41 7 %      MCV 63 fL      MCH 18 9 pg      MCHC 30 0 g/dL      RDW 18 6 %      MPV 8 6 fL      Platelets 551 Thousands/uL      nRBC 0 /100 WBCs      Neutrophils Relative 71 %      Immat GRANS % 1 %      Lymphocytes Relative 18 %      Monocytes Relative 8 %      Eosinophils Relative 1 %      Basophils Relative 1 %      Neutrophils Absolute 9 33 Thousands/µL      Immature Grans Absolute 0 16 Thousand/uL      Lymphocytes Absolute 2 34 Thousands/µL      Monocytes Absolute 1 03 Thousand/µL      Eosinophils Absolute 0 12 Thousand/µL      Basophils Absolute 0 08 Thousands/µL     Comprehensive metabolic panel [[id number]] Collected: 09/28/22 2334    Lab Status: In process Specimen: Blood from Arm, Right Updated: 09/28/22 2339                 CT abdomen pelvis with contrast    (Results Pending)              Procedures  Procedures         ED Course                                             MDM  Number of Diagnoses or Management Options  Abdominal pain  Colitis  Nausea and vomiting  Diagnosis management comments: Recurrent abdominal pain, nausea, vomiting  Patient complaining of severe pain emergency department however grossly benign abdominal examination    Given patient's significant history of inflammatory bowel disease, repeat CT abdomen pelvis performed demonstrating areas of thickening possibly suggestive of colitis  Patient is already on a steroid taper  Instructed to continue steroid taper at higher dose  Received high-dose Solu-Medrol in emergency department  Patient requiring multiple doses of Dilaudid however feeling better on reassessment  Discharged with verbal and written return precautions  Disposition  Final diagnoses:   None     ED Disposition     None      Follow-up Information    None         Patient's Medications   Discharge Prescriptions    No medications on file       No discharge procedures on file      PDMP Review       Value Time User    PDMP Reviewed  Yes 9/26/2022  5:08 PM Lety Ronquillo DO          ED Provider  Electronically Signed by           Erin Mesa MD  09/30/22 9571

## 2022-09-29 NOTE — DISCHARGE INSTRUCTIONS
Continue Tylenol for symptomatic relief of pain  If pain becomes severe, if you have fevers, shaking chills, return to the emergency department  We have prescribed Zofran for nausea  Continue to drink lots of fluids

## 2022-09-30 ENCOUNTER — APPOINTMENT (OUTPATIENT)
Dept: RADIOLOGY | Facility: HOSPITAL | Age: 27
DRG: 872 | End: 2022-09-30
Payer: COMMERCIAL

## 2022-09-30 ENCOUNTER — HOSPITAL ENCOUNTER (INPATIENT)
Facility: HOSPITAL | Age: 27
LOS: 7 days | Discharge: HOME | DRG: 872 | End: 2022-10-07
Attending: EMERGENCY MEDICINE | Admitting: INTERNAL MEDICINE
Payer: COMMERCIAL

## 2022-09-30 DIAGNOSIS — K91.850 ILEAL POUCHITIS (HCC): ICD-10-CM

## 2022-09-30 DIAGNOSIS — K51.90 ULCERATIVE COLITIS (HCC): ICD-10-CM

## 2022-09-30 DIAGNOSIS — R10.9 ABDOMINAL PAIN: ICD-10-CM

## 2022-09-30 DIAGNOSIS — R10.9 INTRACTABLE ABDOMINAL PAIN: ICD-10-CM

## 2022-09-30 DIAGNOSIS — K56.609 BOWEL OBSTRUCTION (HCC): Primary | ICD-10-CM

## 2022-09-30 LAB
ALBUMIN SERPL BCP-MCNC: 3.8 G/DL (ref 3.5–5)
ALP SERPL-CCNC: 73 U/L (ref 46–116)
ALT SERPL W P-5'-P-CCNC: 30 U/L (ref 12–78)
AMPHETAMINES SERPL QL SCN: NEGATIVE
ANION GAP SERPL CALCULATED.3IONS-SCNC: 8 MMOL/L (ref 4–13)
APTT PPP: 26 SECONDS (ref 23–37)
AST SERPL W P-5'-P-CCNC: 10 U/L (ref 5–45)
BACTERIA UR QL AUTO: ABNORMAL /HPF
BARBITURATES UR QL: NEGATIVE
BASOPHILS # BLD AUTO: 0.04 THOUSANDS/ΜL (ref 0–0.1)
BASOPHILS NFR BLD AUTO: 0 % (ref 0–1)
BENZODIAZ UR QL: NEGATIVE
BILIRUB SERPL-MCNC: 0.59 MG/DL (ref 0.2–1)
BILIRUB UR QL STRIP: NEGATIVE
BUN SERPL-MCNC: 14 MG/DL (ref 5–25)
CALCIUM SERPL-MCNC: 9.1 MG/DL (ref 8.3–10.1)
CHLORIDE SERPL-SCNC: 104 MMOL/L (ref 96–108)
CLARITY UR: CLEAR
CO2 SERPL-SCNC: 33 MMOL/L (ref 21–32)
COCAINE UR QL: NEGATIVE
COLOR UR: YELLOW
CREAT SERPL-MCNC: 1.27 MG/DL (ref 0.6–1.3)
EOSINOPHIL # BLD AUTO: 0.06 THOUSAND/ΜL (ref 0–0.61)
EOSINOPHIL NFR BLD AUTO: 0 % (ref 0–6)
ERYTHROCYTE [DISTWIDTH] IN BLOOD BY AUTOMATED COUNT: 18.5 % (ref 11.6–15.1)
GFR SERPL CREATININE-BSD FRML MDRD: 75 ML/MIN/1.73SQ M
GLUCOSE SERPL-MCNC: 114 MG/DL (ref 65–140)
GLUCOSE UR STRIP-MCNC: NEGATIVE MG/DL
HCT VFR BLD AUTO: 41.5 % (ref 36.5–49.3)
HGB BLD-MCNC: 12.2 G/DL (ref 12–17)
HGB UR QL STRIP.AUTO: ABNORMAL
IMM GRANULOCYTES # BLD AUTO: 0.13 THOUSAND/UL (ref 0–0.2)
IMM GRANULOCYTES NFR BLD AUTO: 1 % (ref 0–2)
INR PPP: 1.03 (ref 0.84–1.19)
KETONES UR STRIP-MCNC: NEGATIVE MG/DL
LACTATE SERPL-SCNC: 1.8 MMOL/L (ref 0.5–2)
LACTATE SERPL-SCNC: 2.1 MMOL/L (ref 0.5–2)
LEUKOCYTE ESTERASE UR QL STRIP: NEGATIVE
LIPASE SERPL-CCNC: 160 U/L (ref 73–393)
LYMPHOCYTES # BLD AUTO: 3.01 THOUSANDS/ΜL (ref 0.6–4.47)
LYMPHOCYTES NFR BLD AUTO: 15 % (ref 14–44)
MCH RBC QN AUTO: 18.7 PG (ref 26.8–34.3)
MCHC RBC AUTO-ENTMCNC: 29.4 G/DL (ref 31.4–37.4)
MCV RBC AUTO: 64 FL (ref 82–98)
METHADONE UR QL: NEGATIVE
MONOCYTES # BLD AUTO: 1.53 THOUSAND/ΜL (ref 0.17–1.22)
MONOCYTES NFR BLD AUTO: 7 % (ref 4–12)
MUCOUS THREADS UR QL AUTO: ABNORMAL
NEUTROPHILS # BLD AUTO: 16.02 THOUSANDS/ΜL (ref 1.85–7.62)
NEUTS SEG NFR BLD AUTO: 77 % (ref 43–75)
NITRITE UR QL STRIP: NEGATIVE
NON-SQ EPI CELLS URNS QL MICRO: ABNORMAL /HPF
NRBC BLD AUTO-RTO: 0 /100 WBCS
OPIATES UR QL SCN: POSITIVE
OXYCODONE+OXYMORPHONE UR QL SCN: NEGATIVE
PCP UR QL: NEGATIVE
PH UR STRIP.AUTO: 6 [PH]
PLATELET # BLD AUTO: 633 THOUSANDS/UL (ref 149–390)
PMV BLD AUTO: 8.8 FL (ref 8.9–12.7)
POTASSIUM SERPL-SCNC: 3.2 MMOL/L (ref 3.5–5.3)
PROT SERPL-MCNC: 7.4 G/DL (ref 6.4–8.4)
PROT UR STRIP-MCNC: NEGATIVE MG/DL
PROTHROMBIN TIME: 13.7 SECONDS (ref 11.6–14.5)
RBC # BLD AUTO: 6.53 MILLION/UL (ref 3.88–5.62)
RBC #/AREA URNS AUTO: ABNORMAL /HPF
SODIUM SERPL-SCNC: 145 MMOL/L (ref 135–147)
SP GR UR STRIP.AUTO: >=1.03 (ref 1–1.03)
THC UR QL: NEGATIVE
UROBILINOGEN UR QL STRIP.AUTO: 0.2 E.U./DL
WBC # BLD AUTO: 20.79 THOUSAND/UL (ref 4.31–10.16)
WBC #/AREA URNS AUTO: ABNORMAL /HPF

## 2022-09-30 PROCEDURE — 96361 HYDRATE IV INFUSION ADD-ON: CPT

## 2022-09-30 PROCEDURE — 85025 COMPLETE CBC W/AUTO DIFF WBC: CPT | Performed by: EMERGENCY MEDICINE

## 2022-09-30 PROCEDURE — 80307 DRUG TEST PRSMV CHEM ANLYZR: CPT | Performed by: STUDENT IN AN ORGANIZED HEALTH CARE EDUCATION/TRAINING PROGRAM

## 2022-09-30 PROCEDURE — 74022 RADEX COMPL AQT ABD SERIES: CPT

## 2022-09-30 PROCEDURE — 85730 THROMBOPLASTIN TIME PARTIAL: CPT | Performed by: EMERGENCY MEDICINE

## 2022-09-30 PROCEDURE — 83690 ASSAY OF LIPASE: CPT | Performed by: EMERGENCY MEDICINE

## 2022-09-30 PROCEDURE — 99221 1ST HOSP IP/OBS SF/LOW 40: CPT | Performed by: STUDENT IN AN ORGANIZED HEALTH CARE EDUCATION/TRAINING PROGRAM

## 2022-09-30 PROCEDURE — C9113 INJ PANTOPRAZOLE SODIUM, VIA: HCPCS | Performed by: NURSE PRACTITIONER

## 2022-09-30 PROCEDURE — 99285 EMERGENCY DEPT VISIT HI MDM: CPT | Performed by: EMERGENCY MEDICINE

## 2022-09-30 PROCEDURE — 85610 PROTHROMBIN TIME: CPT | Performed by: EMERGENCY MEDICINE

## 2022-09-30 PROCEDURE — 99285 EMERGENCY DEPT VISIT HI MDM: CPT

## 2022-09-30 PROCEDURE — 80053 COMPREHEN METABOLIC PANEL: CPT | Performed by: EMERGENCY MEDICINE

## 2022-09-30 PROCEDURE — 96376 TX/PRO/DX INJ SAME DRUG ADON: CPT

## 2022-09-30 PROCEDURE — 96374 THER/PROPH/DIAG INJ IV PUSH: CPT

## 2022-09-30 PROCEDURE — 81001 URINALYSIS AUTO W/SCOPE: CPT | Performed by: STUDENT IN AN ORGANIZED HEALTH CARE EDUCATION/TRAINING PROGRAM

## 2022-09-30 PROCEDURE — 83605 ASSAY OF LACTIC ACID: CPT | Performed by: EMERGENCY MEDICINE

## 2022-09-30 PROCEDURE — 96375 TX/PRO/DX INJ NEW DRUG ADDON: CPT

## 2022-09-30 PROCEDURE — 87040 BLOOD CULTURE FOR BACTERIA: CPT | Performed by: STUDENT IN AN ORGANIZED HEALTH CARE EDUCATION/TRAINING PROGRAM

## 2022-09-30 PROCEDURE — 36415 COLL VENOUS BLD VENIPUNCTURE: CPT | Performed by: EMERGENCY MEDICINE

## 2022-09-30 RX ORDER — SODIUM CHLORIDE 9 MG/ML
75 INJECTION, SOLUTION INTRAVENOUS CONTINUOUS
Status: DISCONTINUED | OUTPATIENT
Start: 2022-09-30 | End: 2022-10-07 | Stop reason: HOSPADM

## 2022-09-30 RX ORDER — ACETAMINOPHEN 325 MG/1
650 TABLET ORAL AS NEEDED
Status: DISCONTINUED | OUTPATIENT
Start: 2022-09-30 | End: 2022-09-30

## 2022-09-30 RX ORDER — HYDROMORPHONE HCL 110MG/55ML
2 PATIENT CONTROLLED ANALGESIA SYRINGE INTRAVENOUS EVERY 6 HOURS PRN
Status: DISCONTINUED | OUTPATIENT
Start: 2022-09-30 | End: 2022-09-30

## 2022-09-30 RX ORDER — ONDANSETRON 2 MG/ML
4 INJECTION INTRAMUSCULAR; INTRAVENOUS ONCE
Status: COMPLETED | OUTPATIENT
Start: 2022-09-30 | End: 2022-09-30

## 2022-09-30 RX ORDER — OXYCODONE HYDROCHLORIDE AND ACETAMINOPHEN 5; 325 MG/1; MG/1
1 TABLET ORAL EVERY 6 HOURS PRN
Status: DISCONTINUED | OUTPATIENT
Start: 2022-09-30 | End: 2022-09-30

## 2022-09-30 RX ORDER — METRONIDAZOLE 500 MG/100ML
500 INJECTION, SOLUTION INTRAVENOUS EVERY 8 HOURS
Status: DISCONTINUED | OUTPATIENT
Start: 2022-09-30 | End: 2022-10-07 | Stop reason: HOSPADM

## 2022-09-30 RX ORDER — ONDANSETRON 4 MG/1
4 TABLET, ORALLY DISINTEGRATING ORAL EVERY 6 HOURS PRN
Status: DISCONTINUED | OUTPATIENT
Start: 2022-09-30 | End: 2022-10-06 | Stop reason: SDUPTHER

## 2022-09-30 RX ORDER — CALCIUM CARBONATE 200(500)MG
500 TABLET,CHEWABLE ORAL DAILY PRN
Status: DISCONTINUED | OUTPATIENT
Start: 2022-09-30 | End: 2022-10-07 | Stop reason: HOSPADM

## 2022-09-30 RX ORDER — ONDANSETRON 2 MG/ML
4 INJECTION INTRAMUSCULAR; INTRAVENOUS EVERY 8 HOURS PRN
Status: DISCONTINUED | OUTPATIENT
Start: 2022-09-30 | End: 2022-10-07 | Stop reason: HOSPADM

## 2022-09-30 RX ORDER — DULOXETIN HYDROCHLORIDE 60 MG/1
60 CAPSULE, DELAYED RELEASE ORAL DAILY
Status: DISCONTINUED | OUTPATIENT
Start: 2022-09-30 | End: 2022-10-07 | Stop reason: HOSPADM

## 2022-09-30 RX ORDER — PANTOPRAZOLE SODIUM 40 MG/10ML
40 INJECTION, POWDER, LYOPHILIZED, FOR SOLUTION INTRAVENOUS EVERY 12 HOURS SCHEDULED
Status: DISCONTINUED | OUTPATIENT
Start: 2022-09-30 | End: 2022-10-07 | Stop reason: HOSPADM

## 2022-09-30 RX ORDER — PREDNISONE 20 MG/1
40 TABLET ORAL DAILY
Status: DISCONTINUED | OUTPATIENT
Start: 2022-09-30 | End: 2022-09-30

## 2022-09-30 RX ORDER — METHYLPREDNISOLONE SODIUM SUCCINATE 40 MG/ML
20 INJECTION, POWDER, LYOPHILIZED, FOR SOLUTION INTRAMUSCULAR; INTRAVENOUS EVERY 8 HOURS SCHEDULED
Status: DISCONTINUED | OUTPATIENT
Start: 2022-09-30 | End: 2022-10-07 | Stop reason: HOSPADM

## 2022-09-30 RX ORDER — SACCHAROMYCES BOULARDII 250 MG
250 CAPSULE ORAL 2 TIMES DAILY
Status: DISCONTINUED | OUTPATIENT
Start: 2022-09-30 | End: 2022-10-07 | Stop reason: HOSPADM

## 2022-09-30 RX ORDER — NALOXONE HYDROCHLORIDE 1 MG/ML
2 INJECTION INTRAMUSCULAR; INTRAVENOUS; SUBCUTANEOUS ONCE
Status: DISCONTINUED | OUTPATIENT
Start: 2022-09-30 | End: 2022-09-30

## 2022-09-30 RX ORDER — HYDROMORPHONE HCL 110MG/55ML
2 PATIENT CONTROLLED ANALGESIA SYRINGE INTRAVENOUS ONCE
Status: COMPLETED | OUTPATIENT
Start: 2022-09-30 | End: 2022-09-30

## 2022-09-30 RX ORDER — ENOXAPARIN SODIUM 100 MG/ML
30 INJECTION SUBCUTANEOUS DAILY
Status: DISCONTINUED | OUTPATIENT
Start: 2022-09-30 | End: 2022-10-07 | Stop reason: HOSPADM

## 2022-09-30 RX ORDER — PANTOPRAZOLE SODIUM 40 MG/1
40 TABLET, DELAYED RELEASE ORAL 2 TIMES DAILY
Status: DISCONTINUED | OUTPATIENT
Start: 2022-09-30 | End: 2022-09-30

## 2022-09-30 RX ORDER — ACETAMINOPHEN 325 MG/1
650 TABLET ORAL EVERY 6 HOURS PRN
Status: DISCONTINUED | OUTPATIENT
Start: 2022-09-30 | End: 2022-10-07 | Stop reason: HOSPADM

## 2022-09-30 RX ORDER — HYDROMORPHONE HCL 110MG/55ML
2 PATIENT CONTROLLED ANALGESIA SYRINGE INTRAVENOUS ONCE
Status: DISCONTINUED | OUTPATIENT
Start: 2022-09-30 | End: 2022-09-30

## 2022-09-30 RX ORDER — SODIUM CHLORIDE 9 MG/ML
150 INJECTION, SOLUTION INTRAVENOUS CONTINUOUS
Status: DISCONTINUED | OUTPATIENT
Start: 2022-09-30 | End: 2022-09-30

## 2022-09-30 RX ORDER — OXYCODONE HYDROCHLORIDE AND ACETAMINOPHEN 5; 325 MG/1; MG/1
1 TABLET ORAL EVERY 6 HOURS PRN
Status: DISCONTINUED | OUTPATIENT
Start: 2022-09-30 | End: 2022-10-06

## 2022-09-30 RX ORDER — METRONIDAZOLE 500 MG/100ML
500 INJECTION, SOLUTION INTRAVENOUS EVERY 8 HOURS
Status: DISCONTINUED | OUTPATIENT
Start: 2022-09-30 | End: 2022-09-30

## 2022-09-30 RX ORDER — HYDROMORPHONE HCL/PF 1 MG/ML
0.5 SYRINGE (ML) INJECTION
Status: DISCONTINUED | OUTPATIENT
Start: 2022-09-30 | End: 2022-10-06

## 2022-09-30 RX ORDER — ONDANSETRON 2 MG/ML
4 INJECTION INTRAMUSCULAR; INTRAVENOUS EVERY 6 HOURS PRN
Status: DISCONTINUED | OUTPATIENT
Start: 2022-09-30 | End: 2022-09-30 | Stop reason: SDUPTHER

## 2022-09-30 RX ORDER — METRONIDAZOLE 500 MG/1
500 TABLET ORAL EVERY 8 HOURS SCHEDULED
Status: DISCONTINUED | OUTPATIENT
Start: 2022-09-30 | End: 2022-09-30

## 2022-09-30 RX ADMIN — Medication 250 MG: at 17:43

## 2022-09-30 RX ADMIN — MORPHINE SULFATE 2 MG: 2 INJECTION, SOLUTION INTRAMUSCULAR; INTRAVENOUS at 16:36

## 2022-09-30 RX ADMIN — HYDROMORPHONE HYDROCHLORIDE 0.5 MG: 1 INJECTION, SOLUTION INTRAMUSCULAR; INTRAVENOUS; SUBCUTANEOUS at 23:45

## 2022-09-30 RX ADMIN — HYDROMORPHONE HYDROCHLORIDE 2 MG: 2 INJECTION, SOLUTION INTRAMUSCULAR; INTRAVENOUS; SUBCUTANEOUS at 09:57

## 2022-09-30 RX ADMIN — PANTOPRAZOLE SODIUM 40 MG: 40 INJECTION, POWDER, FOR SOLUTION INTRAVENOUS at 20:27

## 2022-09-30 RX ADMIN — ONDANSETRON 4 MG: 2 INJECTION INTRAMUSCULAR; INTRAVENOUS at 11:25

## 2022-09-30 RX ADMIN — SODIUM CHLORIDE 1000 ML: 0.9 INJECTION, SOLUTION INTRAVENOUS at 09:57

## 2022-09-30 RX ADMIN — METRONIDAZOLE 500 MG: 500 INJECTION, SOLUTION INTRAVENOUS at 21:09

## 2022-09-30 RX ADMIN — ONDANSETRON 4 MG: 2 INJECTION INTRAMUSCULAR; INTRAVENOUS at 18:51

## 2022-09-30 RX ADMIN — PREDNISONE 40 MG: 20 TABLET ORAL at 15:35

## 2022-09-30 RX ADMIN — PANTOPRAZOLE SODIUM 40 MG: 40 TABLET, DELAYED RELEASE ORAL at 17:43

## 2022-09-30 RX ADMIN — ENOXAPARIN SODIUM 30 MG: 30 INJECTION SUBCUTANEOUS at 15:35

## 2022-09-30 RX ADMIN — HYDROMORPHONE HYDROCHLORIDE 2 MG: 2 INJECTION, SOLUTION INTRAMUSCULAR; INTRAVENOUS; SUBCUTANEOUS at 12:56

## 2022-09-30 RX ADMIN — ONDANSETRON 4 MG: 2 INJECTION INTRAMUSCULAR; INTRAVENOUS at 09:57

## 2022-09-30 RX ADMIN — SODIUM CHLORIDE 125 ML/HR: 0.9 INJECTION, SOLUTION INTRAVENOUS at 15:41

## 2022-09-30 RX ADMIN — HYDROMORPHONE HYDROCHLORIDE 0.5 MG: 1 INJECTION, SOLUTION INTRAMUSCULAR; INTRAVENOUS; SUBCUTANEOUS at 20:28

## 2022-09-30 RX ADMIN — METRONIDAZOLE 500 MG: 500 INJECTION, SOLUTION INTRAVENOUS at 13:48

## 2022-09-30 RX ADMIN — SODIUM CHLORIDE 125 ML/HR: 0.9 INJECTION, SOLUTION INTRAVENOUS at 23:06

## 2022-09-30 RX ADMIN — SODIUM CHLORIDE 150 ML/HR: 0.9 INJECTION, SOLUTION INTRAVENOUS at 13:49

## 2022-09-30 RX ADMIN — METHYLPREDNISOLONE SODIUM SUCCINATE 20 MG: 40 INJECTION, POWDER, FOR SOLUTION INTRAMUSCULAR; INTRAVENOUS at 21:09

## 2022-09-30 RX ADMIN — ANTACID TABLETS 500 MG: 500 TABLET, CHEWABLE ORAL at 20:27

## 2022-09-30 RX ADMIN — DULOXETINE HYDROCHLORIDE 60 MG: 60 CAPSULE, DELAYED RELEASE ORAL at 15:35

## 2022-09-30 NOTE — ASSESSMENT & PLAN NOTE
May be resolving, due to bowel move it of greater than 3 oz of feces nonbloody and gas  Reported with abdominalPain,   abdominal x-ray positive findings  For patient refused NG tube for decompression  GI consulted, will reconsider surgery consult

## 2022-09-30 NOTE — H&P
[address] 1995, 32 y o  male MRN: [id number]  Unit/Bed#: [address] [place] [address] Encounter: [id number]  Primary Care Provider: Jillian Mahmood DO   Date and time admitted to hospital: 9/30/2022  9:31 AM    SBO (small bowel obstruction) [facility])  Assessment & Plan  May be resolving, due to bowel move it of greater than 3 oz of feces nonbloody and gas  Reported with abdominalPain,   abdominal x-ray positive findings  For patient refused NG tube for decompression  GI consulted, will reconsider surgery consult    Sepsis without acute organ dysfunction [facility])  Assessment & Plan  Meets criteria with elevated WBCs (possibly secondary to steroid home consumption), tachycardic  Continue IV metronidazole  GI consulted, appreciate recs  Pan cultures pending urine cultures pending  Will monitor    Chronic ulcerative pancolitis ([facility] )  Assessment & Plan  Chronic, recurrent flare, give IV Solu-Medrol GI consult    * 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[facility] )  Assessment & Plan  Unresolved, Dilaudid continue home pain meds appreciate GI recs      VTE Pharmacologic Prophylaxis:   Moderate Risk (Score 3-4) - Pharmacological DVT Prophylaxis Ordered: enoxaparin (Lovenox)  Code Status: Level 1 - Full Code   Discussion with family: Updated  (father and mother) at bedside  Anticipated Length of Stay: Patient will be admitted on an inpatient basis with an anticipated length of stay of greater than 2 midnights secondary to Clinical evaluation and care  Total Time for Visit, including Counseling / Coordination of Care: 60 minutes Greater than 50% of this total time spent on direct patient counseling and coordination of care  Chief Complaint:  Abdominal pain    History of Present Illness:  Doiran Elizabeth is a 32 y o  male with a PMH of UC, ileal pouchitis, in who presents with abdominal pain status post colectomy with ileal pouch  Patient is a readmission with current use of steroid taper    His followed by specialist in [place] for his UC  Patient stated that he has had abdominal pain since last night, had a small meal last night, and some nausea patient denied any vomiting  Patient stated he feels like his obstructive because he felt this way in the past but did have a bowel movement today with about 3 oz of nonbloody cc and gas  Patient and parents split shared decision making determining plan of transferring to specialist versus monitoring overnight with steroid infusion to determine if improvement in symptoms  Patient reported that his pain is only controlled with Dilaudid  Review of Systems:  Review of Systems   Constitutional: Negative for chills and fever  HENT: Negative for ear pain and sore throat  Eyes: Negative for pain and visual disturbance  Respiratory: Negative for cough and shortness of breath  Cardiovascular: Negative for chest pain and palpitations  Gastrointestinal: Positive for abdominal distention, abdominal pain and nausea  Negative for anal bleeding, blood in stool, diarrhea and vomiting  Genitourinary: Negative for dysuria and hematuria  Musculoskeletal: Negative for arthralgias and back pain  Skin: Negative for color change and rash  Neurological: Negative for dizziness, seizures and syncope  Psychiatric/Behavioral: Negative for agitation  All other systems reviewed and are negative        Past Medical and Surgical History:   Past Medical History:   Diagnosis Date   • [facility]ylosing spondylitis ([facility] )    • Anxiety    • Bowel obstruction ([facility] )    • Clostridium difficile colitis 9/13/2018   • Colitis    • 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[facility] ) 9/13/2018   • Pancreatitis    • Ulcerative colitis ([facility] )        Past Surgical History:   Procedure Laterality Date   • COLECTOMY TOTAL      with ileal pouch and anastemosis   • IR PICC PLACEMENT SINGLE LUMEN  3/1/2022   • TOTAL COLECTOMY         Meds/Allergies:  Prior to Admission medications    Medication Sig Start Date End Date Taking? Authorizing Provider   DULoxetine (CYMBALTA) 60 mg delayed release capsule Take 1 capsule (60 mg total) by mouth daily 6/10/22  Yes Zeinab Hickey DO   metroNIDAZOLE (FLAGYL) 500 mg tablet Take 1 tablet (500 mg total) by mouth every 8 (eight) hours 8/27/22 10/26/22 Yes Darlin Martinez DO   predniSONE 10 mg tablet Take 4 tablets (40 mg total) by mouth daily for 7 days, THEN 3 tablets (30 mg total) daily for 7 days, THEN 2 tablets (20 mg total) daily for 7 days, THEN 1 tablet (10 mg total) daily for 7 days  9/27/22 10/25/22 Yes Darlin Martinez DO   ondansetron (ZOFRAN) 4 mg tablet Take 1 tablet (4 mg total) by mouth every 6 (six) hours 9/29/22   Vonda Mace MD   oxyCODONE-acetaminophen (Percocet) 5-325 mg per tablet Take 1 tablet by mouth every 6 (six) hours as needed for severe pain Max Daily Amount: 4 tablets 9/19/22   Zeinab Hickey DO   pantoprazole (PROTONIX) 40 mg tablet Take 1 tablet (40 mg total) by mouth 2 (two) times a day 9/26/22 10/26/22  Darlin Martinez DO   saccharomyces boulardii (FLORASTOR) 250 mg capsule Take 1 capsule (250 mg total) by mouth 2 (two) times a day 8/27/22   Darlin Martinez DO     I have reviewed home medications using recent Epic encounter  Allergies:    Allergies   Allergen Reactions   • Cefazolin Hives     Other reaction(s): Arrythmia (Abnormal Heartbeat), Rash Maculopapular   • Mesalamine GI Intolerance     Other reaction(s): Pancreatitis  Annotation - [id number]: pancreatitis       Social History:  Marital Status: Single   Occupation:  [employer]  Patient Pre-hospital Living Situation: Home  Patient Pre-hospital Level of Mobility: walks  Patient Pre-hospital Diet Restrictions:   Substance Use History:   Social History     Substance and Sexual Activity   Alcohol Use Not Currently    Comment: pt states 5 a month/socially     Social History     Tobacco Use   Smoking Status Never Smoker   Smokeless Tobacco Never Used     Social History     Substance and Sexual Activity Drug Use No       Family History:  History reviewed  No pertinent family history  Physical Exam:     Vitals:   Blood Pressure: 110/75 (09/30/22 1322)  Pulse: 82 (09/30/22 1322)  Temperature: 97 7 °F (36 5 °C) (09/30/22 1322)  Temp Source: Tympanic (09/30/22 1254)  Respirations: 20 (09/30/22 1254)  Height: 5' 9" (175 3 cm) (09/30/22 0935)  Weight - Scale: 82 6 kg (182 lb) (09/30/22 0935)  SpO2: 95 % (09/30/22 1322)    Physical Exam  Vitals and nursing note reviewed  Constitutional:       Appearance: He is well-developed  HENT:      Head: Normocephalic and atraumatic  Eyes:      Conjunctiva/sclera: Conjunctivae normal    Cardiovascular:      Rate and Rhythm: Normal rate and regular rhythm  Heart sounds: No murmur heard  Pulmonary:      Effort: Pulmonary effort is normal  No respiratory distress  Breath sounds: Normal breath sounds  No stridor  No wheezing, rhonchi or rales  Abdominal:      General: There is distension  Palpations: Abdomen is soft  There is no mass  Tenderness: There is no abdominal tenderness  There is no guarding or rebound  Hernia: No hernia is present  Musculoskeletal:      Cervical back: Neck supple  Right lower leg: Edema present  Left lower leg: No edema  Skin:     General: Skin is warm and dry  Neurological:      Mental Status: He is alert and oriented to person, place, and time  Motor: No weakness  Gait: Gait normal    Psychiatric:         Mood and Affect: Mood normal          Behavior: Behavior normal          Thought Content:  Thought content normal          Judgment: Judgment normal          Additional Data:     Lab Results:  Results from last 7 days   Lab Units 09/30/22  0957   WBC Thousand/uL 20 79*   HEMOGLOBIN g/dL 12 2   HEMATOCRIT % 41 5   PLATELETS Thousands/uL 633*   NEUTROS PCT % 77*   LYMPHS PCT % 15   MONOS PCT % 7   EOS PCT % 0     Results from last 7 days   Lab Units 09/30/22  0957   SODIUM mmol/L 145   POTASSIUM mmol/L 3 2*   CHLORIDE mmol/L 104   CO2 mmol/L 33*   BUN mg/dL 14   CREATININE mg/dL 1 27   ANION GAP mmol/L 8   CALCIUM mg/dL 9 1   ALBUMIN g/dL 3 8   TOTAL BILIRUBIN mg/dL 0 59   ALK PHOS U/L 73   ALT U/L 30   AST U/L 10   GLUCOSE RANDOM mg/dL 114     Results from last 7 days   Lab Units 09/30/22  0957   INR  1 03             Results from last 7 days   Lab Units 09/30/22  1157 09/30/22  0957   LACTIC ACID mmol/L 1 8 2 1*       Imaging: Reviewed radiology reports from this admission including: abdominal/pelvic CT and xray(s)  XR abdomen obstruction series   Final Result by Mohit Mckinnon MD (09/30 1114)      Findings suggesting high-grade small bowel obstruction with distended bowel loops and air-fluid levels  Workstation performed: [id number]             EKG and Other Studies Reviewed on Admission:   · EKG: NSR  HR nl     ** Please Note: This note has been constructed using a voice recognition system   **

## 2022-09-30 NOTE — NURSING NOTE
Patient does not need NG tube at this this time as per reporting nurse, Dr Chandrakant Brown aware as per reporting nurse

## 2022-09-30 NOTE — ASSESSMENT & PLAN NOTE
Meets criteria with elevated WBCs (possibly secondary to steroid home consumption), tachycardic  Continue IV metronidazole  GI consulted, appreciate recs  Pan cultures pending urine cultures pending  Will monitor

## 2022-09-30 NOTE — PLAN OF CARE
Problem: METABOLIC, FLUID AND ELECTROLYTES - ADULT  Goal: Electrolytes maintained within normal limits  Description: INTERVENTIONS:  - Monitor labs and assess patient for signs and symptoms of electrolyte imbalances  - Administer electrolyte replacement as ordered  - Monitor response to electrolyte replacements, including repeat lab results as appropriate  - Instruct patient on fluid and nutrition as appropriate  Outcome: Progressing  Goal: Fluid balance maintained  Description: INTERVENTIONS:  - Monitor labs   - Monitor I/O and WT  - Instruct patient on fluid and nutrition as appropriate  - Assess for signs & symptoms of volume excess or deficit  Outcome: Progressing     Problem: GASTROINTESTINAL - ADULT  Goal: Minimal or absence of nausea and/or vomiting  Description: INTERVENTIONS:  - Administer IV fluids if ordered to ensure adequate hydration  - Maintain NPO status until nausea and vomiting are resolved  - Nasogastric tube if ordered  - Administer ordered antiemetic medications as needed  - Provide nonpharmacologic comfort measures as appropriate  - Advance diet as tolerated, if ordered  - Consider nutrition services referral to assist patient with adequate nutrition and appropriate food choices  Outcome: Progressing  Goal: Maintains or returns to baseline bowel function  Description: INTERVENTIONS:  - Assess bowel function  - Encourage oral fluids to ensure adequate hydration  - Administer IV fluids if ordered to ensure adequate hydration  - Administer ordered medications as needed  - Encourage mobilization and activity  - Consider nutritional services referral to assist patient with adequate nutrition and appropriate food choices  Outcome: Progressing  Goal: Maintains adequate nutritional intake  Description: INTERVENTIONS:  - Monitor percentage of each meal consumed  - Identify factors contributing to decreased intake, treat as appropriate  - Assist with meals as needed  - Monitor I&O, weight, and lab values if indicated  - Obtain nutrition services referral as needed  Outcome: Progressing     Problem: PAIN - ADULT  Goal: Verbalizes/displays adequate comfort level or baseline comfort level  Description: Interventions:  - Encourage patient to monitor pain and request assistance  - Assess pain using appropriate pain scale  - Administer analgesics based on type and severity of pain and evaluate response  - Implement non-pharmacological measures as appropriate and evaluate response  - Consider cultural and social influences on pain and pain management  - Notify physician/advanced practitioner if interventions unsuccessful or patient reports new pain  Outcome: Progressing

## 2022-10-01 ENCOUNTER — APPOINTMENT (OUTPATIENT)
Dept: RADIOLOGY | Facility: HOSPITAL | Age: 27
DRG: 872 | End: 2022-10-01
Payer: COMMERCIAL

## 2022-10-01 PROBLEM — R79.89 ELEVATED LACTIC ACID LEVEL: Status: ACTIVE | Noted: 2022-10-01

## 2022-10-01 LAB
ALBUMIN SERPL BCP-MCNC: 3.1 G/DL (ref 3.5–5)
ALP SERPL-CCNC: 56 U/L (ref 46–116)
ALT SERPL W P-5'-P-CCNC: 24 U/L (ref 12–78)
ANION GAP SERPL CALCULATED.3IONS-SCNC: 7 MMOL/L (ref 4–13)
AST SERPL W P-5'-P-CCNC: <5 U/L (ref 5–45)
BASOPHILS # BLD AUTO: 0.01 THOUSANDS/ΜL (ref 0–0.1)
BASOPHILS NFR BLD AUTO: 0 % (ref 0–1)
BILIRUB SERPL-MCNC: 0.71 MG/DL (ref 0.2–1)
BUN SERPL-MCNC: 13 MG/DL (ref 5–25)
CALCIUM ALBUM COR SERPL-MCNC: 9.1 MG/DL (ref 8.3–10.1)
CALCIUM SERPL-MCNC: 8.4 MG/DL (ref 8.3–10.1)
CHLORIDE SERPL-SCNC: 102 MMOL/L (ref 96–108)
CO2 SERPL-SCNC: 27 MMOL/L (ref 21–32)
CREAT SERPL-MCNC: 0.94 MG/DL (ref 0.6–1.3)
EOSINOPHIL # BLD AUTO: 0 THOUSAND/ΜL (ref 0–0.61)
EOSINOPHIL NFR BLD AUTO: 0 % (ref 0–6)
ERYTHROCYTE [DISTWIDTH] IN BLOOD BY AUTOMATED COUNT: 16.9 % (ref 11.6–15.1)
GFR SERPL CREATININE-BSD FRML MDRD: 109 ML/MIN/1.73SQ M
GLUCOSE SERPL-MCNC: 123 MG/DL (ref 65–140)
HCT VFR BLD AUTO: 33.6 % (ref 36.5–49.3)
HGB BLD-MCNC: 10 G/DL (ref 12–17)
IMM GRANULOCYTES # BLD AUTO: 0.07 THOUSAND/UL (ref 0–0.2)
IMM GRANULOCYTES NFR BLD AUTO: 1 % (ref 0–2)
LACTATE SERPL-SCNC: 2.7 MMOL/L (ref 0.5–2)
LACTATE SERPL-SCNC: 2.7 MMOL/L (ref 0.5–2)
LYMPHOCYTES # BLD AUTO: 0.76 THOUSANDS/ΜL (ref 0.6–4.47)
LYMPHOCYTES NFR BLD AUTO: 7 % (ref 14–44)
MCH RBC QN AUTO: 18.9 PG (ref 26.8–34.3)
MCHC RBC AUTO-ENTMCNC: 29.8 G/DL (ref 31.4–37.4)
MCV RBC AUTO: 63 FL (ref 82–98)
MONOCYTES # BLD AUTO: 0.31 THOUSAND/ΜL (ref 0.17–1.22)
MONOCYTES NFR BLD AUTO: 3 % (ref 4–12)
NEUTROPHILS # BLD AUTO: 10.59 THOUSANDS/ΜL (ref 1.85–7.62)
NEUTS SEG NFR BLD AUTO: 89 % (ref 43–75)
NRBC BLD AUTO-RTO: 0 /100 WBCS
PLATELET # BLD AUTO: 415 THOUSANDS/UL (ref 149–390)
PMV BLD AUTO: 8.8 FL (ref 8.9–12.7)
POTASSIUM SERPL-SCNC: 4.2 MMOL/L (ref 3.5–5.3)
PROCALCITONIN SERPL-MCNC: <0.05 NG/ML
PROT SERPL-MCNC: 6.3 G/DL (ref 6.4–8.4)
RBC # BLD AUTO: 5.3 MILLION/UL (ref 3.88–5.62)
SODIUM SERPL-SCNC: 136 MMOL/L (ref 135–147)
WBC # BLD AUTO: 11.74 THOUSAND/UL (ref 4.31–10.16)

## 2022-10-01 PROCEDURE — 85025 COMPLETE CBC W/AUTO DIFF WBC: CPT | Performed by: STUDENT IN AN ORGANIZED HEALTH CARE EDUCATION/TRAINING PROGRAM

## 2022-10-01 PROCEDURE — 74022 RADEX COMPL AQT ABD SERIES: CPT

## 2022-10-01 PROCEDURE — 80053 COMPREHEN METABOLIC PANEL: CPT | Performed by: STUDENT IN AN ORGANIZED HEALTH CARE EDUCATION/TRAINING PROGRAM

## 2022-10-01 PROCEDURE — 99232 SBSQ HOSP IP/OBS MODERATE 35: CPT | Performed by: STUDENT IN AN ORGANIZED HEALTH CARE EDUCATION/TRAINING PROGRAM

## 2022-10-01 PROCEDURE — 99222 1ST HOSP IP/OBS MODERATE 55: CPT | Performed by: INTERNAL MEDICINE

## 2022-10-01 PROCEDURE — C9113 INJ PANTOPRAZOLE SODIUM, VIA: HCPCS | Performed by: NURSE PRACTITIONER

## 2022-10-01 PROCEDURE — 83605 ASSAY OF LACTIC ACID: CPT | Performed by: STUDENT IN AN ORGANIZED HEALTH CARE EDUCATION/TRAINING PROGRAM

## 2022-10-01 PROCEDURE — 84145 PROCALCITONIN (PCT): CPT | Performed by: STUDENT IN AN ORGANIZED HEALTH CARE EDUCATION/TRAINING PROGRAM

## 2022-10-01 RX ADMIN — Medication 250 MG: at 09:33

## 2022-10-01 RX ADMIN — HYDROMORPHONE HYDROCHLORIDE 0.5 MG: 1 INJECTION, SOLUTION INTRAMUSCULAR; INTRAVENOUS; SUBCUTANEOUS at 06:14

## 2022-10-01 RX ADMIN — SODIUM CHLORIDE 125 ML/HR: 0.9 INJECTION, SOLUTION INTRAVENOUS at 06:13

## 2022-10-01 RX ADMIN — HYDROMORPHONE HYDROCHLORIDE 0.5 MG: 1 INJECTION, SOLUTION INTRAMUSCULAR; INTRAVENOUS; SUBCUTANEOUS at 18:51

## 2022-10-01 RX ADMIN — METHYLPREDNISOLONE SODIUM SUCCINATE 20 MG: 40 INJECTION, POWDER, FOR SOLUTION INTRAMUSCULAR; INTRAVENOUS at 21:22

## 2022-10-01 RX ADMIN — Medication 250 MG: at 18:12

## 2022-10-01 RX ADMIN — ONDANSETRON 4 MG: 2 INJECTION INTRAMUSCULAR; INTRAVENOUS at 09:33

## 2022-10-01 RX ADMIN — SODIUM CHLORIDE, SODIUM LACTATE, POTASSIUM CHLORIDE, AND CALCIUM CHLORIDE 500 ML: .6; .31; .03; .02 INJECTION, SOLUTION INTRAVENOUS at 06:47

## 2022-10-01 RX ADMIN — DULOXETINE HYDROCHLORIDE 60 MG: 60 CAPSULE, DELAYED RELEASE ORAL at 09:33

## 2022-10-01 RX ADMIN — HYDROMORPHONE HYDROCHLORIDE 0.5 MG: 1 INJECTION, SOLUTION INTRAMUSCULAR; INTRAVENOUS; SUBCUTANEOUS at 15:47

## 2022-10-01 RX ADMIN — PANTOPRAZOLE SODIUM 40 MG: 40 INJECTION, POWDER, FOR SOLUTION INTRAVENOUS at 21:22

## 2022-10-01 RX ADMIN — ENOXAPARIN SODIUM 30 MG: 30 INJECTION SUBCUTANEOUS at 09:32

## 2022-10-01 RX ADMIN — PANTOPRAZOLE SODIUM 40 MG: 40 INJECTION, POWDER, FOR SOLUTION INTRAVENOUS at 09:44

## 2022-10-01 RX ADMIN — METRONIDAZOLE 500 MG: 500 INJECTION, SOLUTION INTRAVENOUS at 13:53

## 2022-10-01 RX ADMIN — METRONIDAZOLE 500 MG: 500 INJECTION, SOLUTION INTRAVENOUS at 05:33

## 2022-10-01 RX ADMIN — SODIUM CHLORIDE 125 ML/HR: 0.9 INJECTION, SOLUTION INTRAVENOUS at 18:11

## 2022-10-01 RX ADMIN — HYDROMORPHONE HYDROCHLORIDE 0.5 MG: 1 INJECTION, SOLUTION INTRAMUSCULAR; INTRAVENOUS; SUBCUTANEOUS at 22:10

## 2022-10-01 RX ADMIN — METRONIDAZOLE 500 MG: 500 INJECTION, SOLUTION INTRAVENOUS at 21:22

## 2022-10-01 RX ADMIN — HYDROMORPHONE HYDROCHLORIDE 0.5 MG: 1 INJECTION, SOLUTION INTRAMUSCULAR; INTRAVENOUS; SUBCUTANEOUS at 12:45

## 2022-10-01 RX ADMIN — METHYLPREDNISOLONE SODIUM SUCCINATE 20 MG: 40 INJECTION, POWDER, FOR SOLUTION INTRAMUSCULAR; INTRAVENOUS at 05:33

## 2022-10-01 RX ADMIN — HYDROMORPHONE HYDROCHLORIDE 0.5 MG: 1 INJECTION, SOLUTION INTRAMUSCULAR; INTRAVENOUS; SUBCUTANEOUS at 02:57

## 2022-10-01 RX ADMIN — METHYLPREDNISOLONE SODIUM SUCCINATE 20 MG: 40 INJECTION, POWDER, FOR SOLUTION INTRAMUSCULAR; INTRAVENOUS at 13:53

## 2022-10-01 RX ADMIN — HYDROMORPHONE HYDROCHLORIDE 0.5 MG: 1 INJECTION, SOLUTION INTRAMUSCULAR; INTRAVENOUS; SUBCUTANEOUS at 09:32

## 2022-10-01 NOTE — PROGRESS NOTES
Julian 43  Progress Note - Luly Lean 1995, 32 y o  male MRN: [id number]  Unit/Bed#: 2 [place] [address] Encounter: [id number]  Primary Care Provider: Wendy Hill DO   Date and time admitted to hospital: 9/30/2022  9:31 AM    Sepsis without acute organ dysfunction [facility])  Assessment & Plan  Improving,  Meets criteria on admission with elevated WBCs (possibly secondary to steroid home consumption), tachycardic  WBC trending down  Continue IV metronidazole  GI consulted, appreciate recs, consider ID recs  Pancultures- pending, urine cultures- pending  Will monitor    Elevated lactic acid level  Assessment & Plan  LA: 2 2 > 2 7  Likely secondary to sepsis versus inflammation from underlying GI distress  Empiric bicarb, will re-evaluate  Will trend      SBO (small bowel obstruction) ([facility] [address] )  Assessment & Plan  Currently stable,  Repeated her bowel movement of greater than 3 oz of feces, nonbloody and positive for gas  Reported with abdominal pain, control with Dilaudid, counseled can slow down GI cilia  Serial AXR  For patient continues to refused NG tube for decompression  GI recs for transfer to [facility] if worsening    Chronic ulcerative pancolitis (HCC)  Assessment & Plan  Chronic,   recurrent flare,   Continue 40 mg IV Solu-Medrol   CT ab:Post colectomy  The neorectum is dilated with mural thickening again noted  Distal ileum also demonstrates wall thickening  There is a second bowel anastomosis slightly proximal to the spine  Preanastomotic bowel loop is also dilated  Repeat KUB-Significant gaseous distention of bowel loops, unchanged in pattern from examination of one day earlier      GI recs:  Diagnosed with UC status post proctocolectomy, complicated with rectal pouchitis and strictures  EGD may demonstrate Crohn's  Followed by surgeon at [facility] (Dr Igor La)- if high grade obx, consider transfer to [facility]  In the interim, continue flagyl, IV solumedrol 20 mg TID  Serial abdominal exams  Likely will need long term biologic therapy (previously exposed to [place])  * 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[address] )  Assessment & Plan  Unresolved,   Dilaudid scheduled and breakthrough pain meds  Antibiotics and steroids as listed          VTE Pharmacologic Prophylaxis:   Moderate Risk (Score 3-4) - Pharmacological DVT Prophylaxis Ordered: enoxaparin (Lovenox)  Patient Centered Rounds: I performed bedside rounds with nursing staff today  Discussions with Specialists or Other Care Team Provider:  GI    Education and Discussions with Family / Patient: Patient declined call to   Time Spent for Care: 30 minutes  More than 50% of total time spent on counseling and coordination of care as described above  Current Length of Stay: 1 day(s)  Current Patient Status: Inpatient   Certification Statement: The patient will continue to require additional inpatient hospital stay due to Clinical course  Discharge Plan: Anticipate discharge in 24-48 hrs to home  Code Status: Level 1 - Full Code    Subjective:   Patient seen at bedside lying supine, patient reported his pain was 5/10 down to 0 with his Dilaudid patient reported another bowel movement today less than 3 oz and positive for gas movement  patient denied eating, did not really have an appetite  Patient had no additional concerns at this time and said he would update his parents  Objective:     Vitals:   Temp (24hrs), Av 7 °F (36 5 °C), Min:97 5 °F (36 4 °C), Max:98 °F (36 7 °C)    Temp:  [97 5 °F (36 4 °C)-98 °F (36 7 °C)] 97 7 °F (36 5 °C)  HR:  [65-81] 72  Resp:  [16-20] 16  BP: (131-148)/() 148/101  SpO2:  [94 %-95 %] 95 %  Body mass index is 26 88 kg/m²  Input and Output Summary (last 24 hours): Intake/Output Summary (Last 24 hours) at 10/1/2022 1703  Last data filed at 10/1/2022 1423  Gross per 24 hour   Intake 2300 ml   Output 1875 ml   Net 425 ml       Physical Exam:   Physical Exam  Vitals and nursing note reviewed  Constitutional:       Appearance: He is well-developed  HENT:      Head: Normocephalic and atraumatic  Eyes:      Conjunctiva/sclera: Conjunctivae normal    Cardiovascular:      Rate and Rhythm: Normal rate and regular rhythm  Heart sounds: No murmur heard  Pulmonary:      Effort: Pulmonary effort is normal  No respiratory distress  Breath sounds: Normal breath sounds  No wheezing or rales  Abdominal:      General: Bowel sounds are normal  There is no distension  Palpations: Abdomen is soft  There is no mass  Tenderness: There is no abdominal tenderness  There is no guarding or rebound  Hernia: No hernia is present  Musculoskeletal:      Cervical back: Neck supple  Right lower leg: No edema  Left lower leg: No edema  Skin:     General: Skin is warm and dry  Neurological:      Mental Status: He is alert and oriented to person, place, and time  Psychiatric:         Mood and Affect: Mood normal          Thought Content:  Thought content normal          Judgment: Judgment normal           Additional Data:     Labs:  Results from last 7 days   Lab Units 10/01/22  0536   WBC Thousand/uL 11 74*   HEMOGLOBIN g/dL 10 0*   HEMATOCRIT % 33 6*   PLATELETS Thousands/uL 415*   NEUTROS PCT % 89*   LYMPHS PCT % 7*   MONOS PCT % 3*   EOS PCT % 0     Results from last 7 days   Lab Units 10/01/22  0536   SODIUM mmol/L 136   POTASSIUM mmol/L 4 2   CHLORIDE mmol/L 102   CO2 mmol/L 27   BUN mg/dL 13   CREATININE mg/dL 0 94   ANION GAP mmol/L 7   CALCIUM mg/dL 8 4   ALBUMIN g/dL 3 1*   TOTAL BILIRUBIN mg/dL 0 71   ALK PHOS U/L 56   ALT U/L 24   AST U/L <5*   GLUCOSE RANDOM mg/dL 123     Results from last 7 days   Lab Units 09/30/22  0957   INR  1 03             Results from last 7 days   Lab Units 10/01/22  0838 10/01/22  0536 09/30/22  1157 09/30/22  0957   LACTIC ACID mmol/L 2 7* 2 7* 1 8 2 1*   PROCALCITONIN ng/ml  --  <0 05  --   --        Lines/Drains:  Invasive Devices  Report Peripheral Intravenous Line  Duration           Peripheral IV 09/30/22 Right Antecubital 1 day                      Imaging: Reviewed radiology reports from this admission including: abdominal/pelvic CT and xray(s)    Recent Cultures (last 7 days):   Results from last 7 days   Lab Units 09/30/22  1529 09/24/22 2038   BLOOD CULTURE  Received in Microbiology Lab  Culture in Progress  Received in Microbiology Lab  Culture in Progress  --    C DIFF TOXIN B BY PCR   --  Negative       Last 24 Hours Medication List:   Current Facility-Administered Medications   Medication Dose Route Frequency Provider Last Rate   • acetaminophen  650 mg Oral Q6H PRN [place] Reyez     • calcium carbonate  500 mg Oral Daily PRN [place] Reyez     • DULoxetine  60 mg Oral Daily Lulu Troncoso MD     • enoxaparin  30 mg Subcutaneous Daily Lulu Troncoso MD     • HYDROmorphone  0 5 mg Intravenous Q3H PRN [place] Reyez     • methylPREDNISolone sodium succinate  20 mg Intravenous [facility] Lulu Troncoso MD     • metroNIDAZOLE  500 mg Intravenous Geo Smith MD Stopped (10/01/22 8317)   • ondansetron  4 mg Intravenous Q8H PRN Lulu Troncoso MD     • ondansetron  4 mg Oral Q6H PRN Lulu Troncoso MD     • oxyCODONE-acetaminophen  1 tablet Oral Q6H PRN [place] Reyez     • pantoprazole  40 mg Intravenous Q12H [address] [place] Reyez     • saccharomyces boulardii  250 mg Oral BID Lulu Troncoso MD     • sodium chloride  125 mL/hr Intravenous Continuous Lulu Troncoso  mL/hr (10/01/22 3435)        Today, Patient Was Seen By: Lulu Troncoso    **Please Note: This note may have been constructed using a voice recognition system  **

## 2022-10-01 NOTE — UTILIZATION REVIEW
Initial Clinical Review    Admission: Date/Time/Statement:   Admission Orders (From admission, onward)     Ordered        09/30/22 1209  1 [facility],[address] [place]  Once                      Orders Placed This Encounter   Procedures   • INPATIENT ADMISSION     Standing Status:   Standing     Number of Occurrences:   1     Order Specific Question:   Level of Care     Answer:   Med Surg [16]     Order Specific Question:   Estimated length of stay     Answer:   More than 2 Midnights     Order Specific Question:   Certification     Answer:   I certify that inpatient services are medically necessary for this patient for a duration of greater than two midnights  See H&P and MD Progress Notes for additional information about the patient's course of treatment  ED Arrival Information     Expected   -    Arrival   9/30/2022 09:28    Acuity   Urgent            Means of arrival   Walk-In    Escorted by   Self    Service   Hospitalist    Admission type   Emergency            Arrival complaint   abd pain, hx of bowel obstruction           Chief Complaint   Patient presents with   • Abdominal Pain     Thinks he has a bowel obstruction     Initial Presentation:   32 yom to ER from home c/o increased abd pain & distention, unable to pass flatus or BM  Recently admitted by me 10 days ago with increased abdominal pain, and ileal pouchitis  He is currently on a steroid wean  He was seen 2 days ago in this ED with increased pain and rectal bleeding  Patient responded to treatment in the ED was treated and released  Hx ulcerative colitis, status post colectomy with ileal pouch formation and subsequent complications  Presents with absent BS, distended abd with generalized tenderness  Admission work-up showing SBO on imaging, leukocytosis, hypokalemia, elevated lactic acid, CRP  Admitted to inpatient status for ileal pouchitis  NPO, started on IVABT, IV steroids, IV PPI,  & IVF, refused NGT      Date: 10/1/22   Day 2:   High grade SBO on imaging, still refusing NGT  Repeat KUB ordered  Remains on IV Flagyl, IV steroids, IVF at this time  Using IV Dilaudid for pain control, IV Zofran for nausea  ED Triage Vitals   Temperature Pulse Respirations Blood Pressure SpO2   09/30/22 0935 09/30/22 0935 09/30/22 0935 09/30/22 0935 09/30/22 0935   98 1 °F (36 7 °C) 103 18 150/83 98 %      Temp Source Heart Rate Source Patient Position - Orthostatic VS BP Location FiO2 (%)   09/30/22 1254 09/30/22 1254 09/30/22 1254 09/30/22 1254 --   Tympanic Monitor Sitting Left arm       Pain Score       09/30/22 0935       9          Wt Readings from Last 1 Encounters:   09/30/22 82 6 kg (182 lb)     Additional Vital Signs:   Date/Time Temp Pulse Resp BP MAP (mmHg) SpO2 O2 Device Patient Position - Orthostatic VS   10/01/22 02:54:27 -- 73 -- 139/77 98 94 % -- --   09/30/22 22:53:13 97 7 °F (36 5 °C) 65 20 140/108 Abnormal  119 95 % -- --   09/30/22 20:23:05 98 °F (36 7 °C) 77 19 131/88 102 95 % None (Room air) Lying   09/30/22 13:22:42 97 7 °F (36 5 °C) 82 -- 110/75 87 95 % -- --   09/30/22 1254 99 1 °F (37 3 °C) 105 20 136/77 -- 96 % None (Room air) Sitting   09/30/22 1102 -- 94 18 129/77 -- 96 % -- --   09/30/22 0935 98 1 °F (36 7 °C) 103 18 150/83 -- 98 % -- --     Pertinent Labs/Diagnostic Test Results:   XR abdomen obstruction series   Final Result  (09/30 1114)      Findings suggesting high-grade small bowel obstruction with distended bowel loops and air-fluid levels          Results from last 7 days   Lab Units 10/01/22  0536 09/30/22  0957 09/28/22  2334   WBC Thousand/uL 11 74* 20 79* 13 06*   HEMOGLOBIN g/dL 10 0* 12 2 12 5   HEMATOCRIT % 33 6* 41 5 41 7   PLATELETS Thousands/uL 415* 633* 564*   NEUTROS ABS Thousands/µL 10 59* 16 02* 9 33*     Results from last 7 days   Lab Units 10/01/22  0536 09/30/22  0957 09/28/22  2334 09/26/22  0509 09/25/22  0508   SODIUM mmol/L 136 145 142 138 140   POTASSIUM mmol/L 4 2 3 2* 3 1* 3 7 4 1   CHLORIDE mmol/L 102 104 104 104 105   CO2 mmol/L 27 33* 31 27 28   ANION GAP mmol/L 7 8 7 7 7   BUN mg/dL 13 14 11 8 6   CREATININE mg/dL 0 94 1 27 1 11 1 05 0 92   EGFR ml/min/1 73sq m 109 75 89 95 111   CALCIUM mg/dL 8 4 9 1 9 0 8 5 8 5     Results from last 7 days   Lab Units 10/01/22  0536 09/30/22  0957 09/28/22  2334   AST U/L <5* 10 17   ALT U/L 24 30 31   ALK PHOS U/L 56 73 83   TOTAL PROTEIN g/dL 6 3* 7 4 7 2   ALBUMIN g/dL 3 1* 3 8 3 5   TOTAL BILIRUBIN mg/dL 0 71 0 59 0 45     Results from last 7 days   Lab Units 10/01/22  0536 09/30/22  0957 09/28/22  2334 09/26/22  0509 09/25/22  0508   GLUCOSE RANDOM mg/dL 123 114 102 169* 126     Results from last 7 days   Lab Units 09/30/22  0957   PROTIME seconds 13 7   INR  1 03   PTT seconds 26     Results from last 7 days   Lab Units 10/01/22  0536   PROCALCITONIN ng/ml <0 05     Results from last 7 days   Lab Units 10/01/22  0838 10/01/22  0536 09/30/22  1157 09/30/22  0957   LACTIC ACID mmol/L 2 7* 2 7* 1 8 2 1*     Results from last 7 days   Lab Units 09/25/22  0508   HEP B S AG  Non-reactive   HEP B C TOTAL AB  Non-reactive     Results from last 7 days   Lab Units 09/30/22  0957   LIPASE u/L 160     Results from last 7 days   Lab Units 09/26/22  0509 09/25/22  0508 09/24/22  1933   CRP mg/L 7 2* 12 7* 18 0*     Results from last 7 days   Lab Units 09/30/22  1500   CLARITY UA  Clear   COLOR UA  Yellow   SPEC GRAV UA  >=1 030   PH UA  6 0   GLUCOSE UA mg/dl Negative   KETONES UA mg/dl Negative   BLOOD UA  Trace-lysed*   PROTEIN UA mg/dl Negative   NITRITE UA  Negative   BILIRUBIN UA  Negative   UROBILINOGEN UA E U /dl 0 2   LEUKOCYTES UA  Negative   WBC UA /hpf 0-1   RBC UA /hpf 0-1   BACTERIA UA /hpf Moderate*   EPITHELIAL CELLS WET PREP /hpf None Seen   MUCUS THREADS  Occasional*     Results from last 7 days   Lab Units 09/30/22  1500   AMPH/METH  Negative   BARBITURATE UR  Negative   BENZODIAZEPINE UR  Negative   COCAINE UR  Negative   METHADONE URINE  Negative   OPIATE UR  Positive*   PCP UR  Negative   THC UR  Negative     Results from last 7 days   Lab Units 09/24/22 2038   C DIFF TOXIN B BY PCR  Negative     Results from last 7 days   Lab Units 09/24/22 2038   SALMONELLA SP PCR  None Detected   SHIGELLA SP/ENTEROINVASIVE E  COLI (EIEC)  None Detected   CAMPYLOBACTER SP (JEJUNI AND COLI)  None Detected   SHIGA TOXIN 1/SHIGA TOXIN 2  None Detected     Results from last 7 days   Lab Units 09/30/22  1529   BLOOD CULTURE  Received in Microbiology Lab  Culture in Progress  Received in Microbiology Lab  Culture in Progress       ED Treatment:   Medication Administration from 09/30/2022 0928 to 09/30/2022 1318       Date/Time Order Dose Route Action     09/30/2022 0957 sodium chloride 0 9 % bolus 1,000 mL 1,000 mL Intravenous New Bag     09/30/2022 0957 ondansetron (ZOFRAN) injection 4 mg 4 mg Intravenous Given     09/30/2022 0957 HYDROmorphone (DILAUDID) injection 2 mg 2 mg Intravenous Given     09/30/2022 1125 ondansetron (ZOFRAN) injection 4 mg 4 mg Intravenous Given     09/30/2022 1256 HYDROmorphone (DILAUDID) injection 2 mg 2 mg Intravenous Given        Past Medical History:   Diagnosis Date   • Ankylosing spondylitis (HCC)    • Anxiety    • Bowel obstruction ([facility])    • Clostridium difficile colitis 9/13/2018   • Colitis    • 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[facility] ) 9/13/2018   • Pancreatitis    • Ulcerative colitis ([facility] )      Present on Admission:  • Chronic ulcerative pancolitis ([facility] )  • 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[facility] )    Admitting Diagnosis: Bowel obstruction ([facility] ) [K56 609]  Ulcerative colitis ([facility] ) [K51 90]  Abdominal pain [R10 9]  Age/Sex: 32 y o  male  Admission Orders:  NGT  Consult GI  Pt eval & tx  scd    Scheduled Medications:  DULoxetine, 60 mg, Oral, Daily  enoxaparin, 30 mg, Subcutaneous, Daily  methylPREDNISolone sodium succinate, 20 mg, Intravenous, Q8H [address]  metroNIDAZOLE, 500 mg, Intravenous, Q8H  pantoprazole, 40 mg, Intravenous, Q12H [address]  saccharomyces boulardii, 250 mg, Oral, BID    Continuous IV Infusions:  sodium chloride, 125 mL/hr, Intravenous, Continuous    PRN Meds:  acetaminophen, 650 mg, Oral, Q6H PRN  calcium carbonate, 500 mg, Oral, Daily PRN  HYDROmorphone, 0 5 mg, Intravenous, Q3H PRN, 9/30 x2, 10/1 x4  ondansetron, 4 mg, Intravenous, Q8H PRN, 9/30 x1, 10/1 x1  ondansetron, 4 mg, Oral, Q6H PRN  oxyCODONE-acetaminophen, 1 tablet, Oral, Q6H PRN    Network Utilization Review Department  ATTENTION: Please call with any questions or concerns to [phone] and carefully listen to the prompts so that you are directed to the right person  All voicemails are confidential   Kenneth Miguel all requests for admission clinical reviews, approved or denied determinations and any other requests to dedicated fax number below belonging to the campus where the patient is receiving treatment   List of dedicated fax numbers for the Facilities:  [address] DENIALS (Administrative/Medical Necessity) [phone]   [address] (Maternity/NICU/Pediatrics) [phone]   915 Ashtyn Perez [phone]   [facility] [phone]   1306 [facility] 150 [facility] [place] [address] [address] Carmine Cortes 28 [phone]   [address] Leonora Martinez [facility] 134 [address] [phone]

## 2022-10-01 NOTE — PHYSICAL THERAPY NOTE
10/01/22 1242   Note Type   Note type Screen   Additional Comments Pt is independent  No skilled PT needs     Licensure   [place] License Number  [id number] [place] [address] [id number]

## 2022-10-01 NOTE — ASSESSMENT & PLAN NOTE
LA: 2 2 > 2 7  Likely secondary to sepsis versus inflammation from underlying GI distress  Empiric bicarb, will re-evaluate  Will trend

## 2022-10-01 NOTE — ASSESSMENT & PLAN NOTE
Chronic,   recurrent flare,   Continue 40 mg IV Solu-Medrol   CT ab:Post colectomy  The neorectum is dilated with mural thickening again noted  Distal ileum also demonstrates wall thickening  There is a second bowel anastomosis slightly proximal to the spine  Preanastomotic bowel loop is also dilated  Repeat KUB-Significant gaseous distention of bowel loops, unchanged in pattern from examination of one day earlier      GI recs:  Diagnosed with UC status post proctocolectomy, complicated with rectal pouchitis and strictures  EGD may demonstrate Crohn's  Followed by surgeon at [facility] (Dr Laila Peña)- if high grade obx, consider transfer to [facility]  In the interim, continue flagyl, IV solumedrol 20 mg TID  Serial abdominal exams  Likely will need long term biologic therapy (previously exposed to [place])

## 2022-10-01 NOTE — CONSULTS
Consultation - 126 [facility]  Luly Lean 32 y o  male MRN: [id number]  Unit/Bed#: 2 [place] [address] Encounter: [id number]        Inpatient consult to gastroenterology  Consult performed by: Barry Burt PA-C  Consult ordered by: Nubia Coffman MD          Reason for Consult / Principal Problem:  IBD, pouchitis, small-bowel obstruction     ASSESSMENT and PLAN:    Principal Problem:    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[facility] 75 )  Active Problems:    Chronic ulcerative pancolitis ([place] [address] )    Sepsis without acute organ dysfunction (HCC)    SBO (small bowel obstruction) ([place] [address] )    #1  Crohn's disease with small bowel obstruction: patient previously dx with UC however due to active disease in the small bowel concern for misdiagnosis with actually Crohn's disease, CT scan 9/29 with dilated neorectum with mural thickening and distal ileal thickening, pre-anastomotic bowel loop also dilated , KUB yesterday showing high grade small bowel obstruction  Clinically patient appears well without vomiting, abdomin not significant distended, had a small bowel movement  -will repeat obstruction series today stat   If this is showing persistent small bowel obstruction would highly recommend NGT placement for decompression (patient previously refused) and surgical evaluation (either her versus transfer to surgeon in [place])  -suspect this is related to active crohn's rather than anastomotic stricture as flex sig 2 months ago without stricture  -continue IV steroids  -ultimately patient needs long term plans for maintenance as he is not currently on anything for Crohn's disease, likely a biologic, had recent negative pre biologic labs  -no indication for a flexible sigmoidoscopy or anastomotic dilation at this time    -------------------------------------------------------------------------------------------------------------------    HPI:  This is a 26-year-old male with a history of initially diagnosed ulcerative colitis status post proctocolectomy with J pouch, recurrent pouchitis, recurrent asthmatic strictures, C diff infection in the past, ankylosing spondylitis who was previously been on both Humira and Leonor Chalet and is currently not on any significant maintenance medication aside from sulfasalazine who presents to the hospital for abdominal pain with inability move his bowels  Patient was recently admitted and was discharged from the hospital   He reports that he was having difficulty moving his bowels and was having abdominal pain that started 2 nights ago and has progressively worsened into yesterday morning prompting his admission  He has had a bowel movement since that time and reports that the pain is still present but much improved  He denies any vomiting during this time  He had a CT scan on the 29th which was notable for some bowel thickening in the area of the eileen rectum as well as the ileum  KUB yesterday was notable for a possible high grade bowel obstruction with air-fluid levels  Patient refused NG tube at that time  He does have a surgeon who he follows regularly up in [facility] and is supposed to have a pouch revision in January of next year  He has been in contact with his surgeon at this time  Denies any blood in the stool  Most recent flexible sigmoidoscopy in August of 2022 was notable for signs of chronic inflammation in the pouch and no obvious signs of anastomotic stricture  In May he had a flexible sigmoidoscopy which showed biopsies positive for active severe disease consistent with enterocolitis  REVIEW OF SYSTEMS:    CONSTITUTIONAL: Denies any fever, chills, or rigors  Good appetite, and no recent weight loss  HEENT: No earache or tinnitus  Denies hearing loss or visual disturbances  CARDIOVASCULAR: No chest pain or palpitations  RESPIRATORY: Denies any cough, hemoptysis, shortness of breath or dyspnea on exertion  GASTROINTESTINAL: As noted in the History of Present Illness     GENITOURINARY: No problems with urination  Denies any hematuria or dysuria  NEUROLOGIC: No dizziness or vertigo, denies headaches  MUSCULOSKELETAL: Denies any muscle or joint pain  SKIN: Denies skin rashes or itching  ENDOCRINE: Denies excessive thirst  Denies intolerance to heat or cold  PSYCHOSOCIAL: Denies depression or anxiety  Denies any recent memory loss  Historical Information   Past Medical History:   Diagnosis Date   • Ankylosing spondylitis ([facility] 75 )    • Anxiety    • Bowel obstruction ([place] [address] )    • Clostridium difficile colitis 9/13/2018   • Colitis    • 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[facility] 75 ) 9/13/2018   • Pancreatitis    • Ulcerative colitis ([facility] 75 )      Past Surgical History:   Procedure Laterality Date   • COLECTOMY TOTAL      with ileal pouch and anastemosis   • IR PICC PLACEMENT SINGLE LUMEN  3/1/2022   • TOTAL COLECTOMY       Social History   Social History     Substance and Sexual Activity   Alcohol Use Not Currently    Comment: pt states 5 a month/socially     Social History     Substance and Sexual Activity   Drug Use No     Social History     Tobacco Use   Smoking Status Never Smoker   Smokeless Tobacco Never Used     History reviewed  No pertinent family history      Meds/Allergies     Medications Prior to Admission   Medication   • DULoxetine (CYMBALTA) 60 mg delayed release capsule   • metroNIDAZOLE (FLAGYL) 500 mg tablet   • predniSONE 10 mg tablet   • ondansetron (ZOFRAN) 4 mg tablet   • oxyCODONE-acetaminophen (Percocet) 5-325 mg per tablet   • pantoprazole (PROTONIX) 40 mg tablet   • saccharomyces boulardii (FLORASTOR) 250 mg capsule     Current Facility-Administered Medications   Medication Dose Route Frequency   • acetaminophen (TYLENOL) tablet 650 mg  650 mg Oral Q6H PRN   • calcium carbonate (TUMS) chewable tablet 500 mg  500 mg Oral Daily PRN   • DULoxetine (CYMBALTA) delayed release capsule 60 mg  60 mg Oral Daily   • enoxaparin (LOVENOX) subcutaneous injection 30 mg  30 mg Subcutaneous Daily   • HYDROmorphone (DILAUDID) injection 0 5 mg  0 5 mg Intravenous Q3H PRN   • methylPREDNISolone sodium succinate (Solu-MEDROL) injection 20 mg  20 mg Intravenous Q8H [address]   • metroNIDAZOLE (FLAGYL) IVPB (premix) 500 mg 100 mL  500 mg Intravenous Q8H   • ondansetron (ZOFRAN) injection 4 mg  4 mg Intravenous Q8H PRN   • ondansetron (ZOFRAN-ODT) dispersible tablet 4 mg  4 mg Oral Q6H PRN   • oxyCODONE-acetaminophen (PERCOCET) 5-325 mg per tablet 1 tablet  1 tablet Oral Q6H PRN   • pantoprazole (PROTONIX) injection 40 mg  40 mg Intravenous Q12H [address]   • saccharomyces boulardii (FLORASTOR) capsule 250 mg  250 mg Oral BID   • sodium chloride 0 9 % infusion  125 mL/hr Intravenous Continuous       Allergies   Allergen Reactions   • Cefazolin Hives     Other reaction(s): Arrythmia (Abnormal Heartbeat), Rash Maculopapular   • Mesalamine GI Intolerance     Other reaction(s): Pancreatitis  [facility] - [id number]: pancreatitis           Objective     Blood pressure 133/76, pulse 81, temperature 97 5 °F (36 4 °C), temperature source Oral, resp  rate 16, height 5' 9" (1 753 m), weight 82 6 kg (182 lb), SpO2 95 %  Intake/Output Summary (Last 24 hours) at 10/1/2022 1311  Last data filed at 10/1/2022 1201  Gross per 24 hour   Intake 2200 ml   Output 1875 ml   Net 325 ml         PHYSICAL EXAM:      General Appearance:   Alert, cooperative, no distress, appears stated age    HEENT:   Normocephalic, atraumatic, anicteric, no oropharyngeal thrush present      Neck:  Supple, symmetrical, trachea midline, no adenopathy;    thyroid: no enlargement/tenderness/nodules; no carotid  bruit or JVD    Lungs:   Clear to auscultation bilaterally; no rales, rhonchi or wheezing; respirations unlabored    Heart[de-identified]   S1 and S2 normal; regular rate and rhythm; no murmur, rub, or gallop     Abdomen:   Soft, non-tender, non-distended; decreased bowel sounds however some positive bowel sounds in the right lower quadrant; no masses, no organomegaly    Genitalia:   Deferred  Rectal:   Deferred    Extremities:  No cyanosis, clubbing or edema    Pulses:  2+ and symmetric all extremities    Skin:  Skin color, texture, turgor normal, no rashes or lesions    Lymph nodes:  No palpable cervical, axillary or inguinal lymphadenopathy        Lab Results:   Results from last 7 days   Lab Units 10/01/22  0536   WBC Thousand/uL 11 74*   HEMOGLOBIN g/dL 10 0*   HEMATOCRIT % 33 6*   PLATELETS Thousands/uL 415*   NEUTROS PCT % 89*   LYMPHS PCT % 7*   MONOS PCT % 3*   EOS PCT % 0     Results from last 7 days   Lab Units 10/01/22  0536   POTASSIUM mmol/L 4 2   CHLORIDE mmol/L 102   CO2 mmol/L 27   BUN mg/dL 13   CREATININE mg/dL 0 94   CALCIUM mg/dL 8 4   ALK PHOS U/L 56   ALT U/L 24   AST U/L <5*     Results from last 7 days   Lab Units 09/30/22  0957   INR  1 03     Results from last 7 days   Lab Units 09/30/22  0957   LIPASE u/L 160       Imaging Studies: I have personally reviewed pertinent imaging studies  XR abdomen obstruction series    Result Date: 9/30/2022  Impression: Findings suggesting high-grade small bowel obstruction with distended bowel loops and air-fluid levels  Workstation performed: [id number]           Patient was seen and examined by Dr Nayla Lyon  All suazo medical decisions were made by Dr Nayla Lyon  Thank you for allowing us to participate in the care of this present patient  We will follow-up with you closely

## 2022-10-01 NOTE — H&P (VIEW-ONLY)
Consultation - 126 [facility]  Geneva Esparza 32 y o  male MRN: [id number]  Unit/Bed#: 2 [place] [address] Encounter: [id number]        Inpatient consult to gastroenterology  Consult performed by: Ben Juarez PA-C  Consult ordered by: Abel Jin MD          Reason for Consult / Principal Problem:  IBD, pouchitis, small-bowel obstruction     ASSESSMENT and PLAN:    Principal Problem:    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[facility] 75 )  Active Problems:    Chronic ulcerative pancolitis ([place] [address] )    Sepsis without acute organ dysfunction (HCC)    SBO (small bowel obstruction) ([place] [address] )    #1  Crohn's disease with small bowel obstruction: patient previously dx with UC however due to active disease in the small bowel concern for misdiagnosis with actually Crohn's disease, CT scan 9/29 with dilated neorectum with mural thickening and distal ileal thickening, pre-anastomotic bowel loop also dilated , KUB yesterday showing high grade small bowel obstruction  Clinically patient appears well without vomiting, abdomin not significant distended, had a small bowel movement  -will repeat obstruction series today stat   If this is showing persistent small bowel obstruction would highly recommend NGT placement for decompression (patient previously refused) and surgical evaluation (either her versus transfer to surgeon in [place])  -suspect this is related to active crohn's rather than anastomotic stricture as flex sig 2 months ago without stricture  -continue IV steroids  -ultimately patient needs long term plans for maintenance as he is not currently on anything for Crohn's disease, likely a biologic, had recent negative pre biologic labs  -no indication for a flexible sigmoidoscopy or anastomotic dilation at this time    -------------------------------------------------------------------------------------------------------------------    HPI:  This is a 29-year-old male with a history of initially diagnosed ulcerative colitis status post proctocolectomy with J pouch, recurrent pouchitis, recurrent asthmatic strictures, C diff infection in the past, ankylosing spondylitis who was previously been on both Humira and [facility] [place] and is currently not on any significant maintenance medication aside from sulfasalazine who presents to the hospital for abdominal pain with inability move his bowels  Patient was recently admitted and was discharged from the hospital   He reports that he was having difficulty moving his bowels and was having abdominal pain that started 2 nights ago and has progressively worsened into yesterday morning prompting his admission  He has had a bowel movement since that time and reports that the pain is still present but much improved  He denies any vomiting during this time  He had a CT scan on the 29th which was notable for some bowel thickening in the area of the eileen rectum as well as the ileum  KUB yesterday was notable for a possible high grade bowel obstruction with air-fluid levels  Patient refused NG tube at that time  He does have a surgeon who he follows regularly up in [facility] and is supposed to have a pouch revision in January of next year  He has been in contact with his surgeon at this time  Denies any blood in the stool  Most recent flexible sigmoidoscopy in August of 2022 was notable for signs of chronic inflammation in the pouch and no obvious signs of anastomotic stricture  In May he had a flexible sigmoidoscopy which showed biopsies positive for active severe disease consistent with enterocolitis  REVIEW OF SYSTEMS:    CONSTITUTIONAL: Denies any fever, chills, or rigors  Good appetite, and no recent weight loss  HEENT: No earache or tinnitus  Denies hearing loss or visual disturbances  CARDIOVASCULAR: No chest pain or palpitations  RESPIRATORY: Denies any cough, hemoptysis, shortness of breath or dyspnea on exertion  GASTROINTESTINAL: As noted in the History of Present Illness     GENITOURINARY: No problems with urination  Denies any hematuria or dysuria  NEUROLOGIC: No dizziness or vertigo, denies headaches  MUSCULOSKELETAL: Denies any muscle or joint pain  SKIN: Denies skin rashes or itching  ENDOCRINE: Denies excessive thirst  Denies intolerance to heat or cold  PSYCHOSOCIAL: Denies depression or anxiety  Denies any recent memory loss  Historical Information   Past Medical History:   Diagnosis Date   • Ankylosing spondylitis ([facility] 75 )    • Anxiety    • Bowel obstruction ([place] [address] )    • Clostridium difficile colitis 9/13/2018   • Colitis    • 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[facility] 75 ) 9/13/2018   • Pancreatitis    • Ulcerative colitis ([facility] 75 )      Past Surgical History:   Procedure Laterality Date   • COLECTOMY TOTAL      with ileal pouch and anastemosis   • IR PICC PLACEMENT SINGLE LUMEN  3/1/2022   • TOTAL COLECTOMY       Social History   Social History     Substance and Sexual Activity   Alcohol Use Not Currently    Comment: pt states 5 a month/socially     Social History     Substance and Sexual Activity   Drug Use No     Social History     Tobacco Use   Smoking Status Never Smoker   Smokeless Tobacco Never Used     History reviewed  No pertinent family history      Meds/Allergies     Medications Prior to Admission   Medication   • DULoxetine (CYMBALTA) 60 mg delayed release capsule   • metroNIDAZOLE (FLAGYL) 500 mg tablet   • predniSONE 10 mg tablet   • ondansetron (ZOFRAN) 4 mg tablet   • oxyCODONE-acetaminophen (Percocet) 5-325 mg per tablet   • pantoprazole (PROTONIX) 40 mg tablet   • saccharomyces boulardii (FLORASTOR) 250 mg capsule     Current Facility-Administered Medications   Medication Dose Route Frequency   • acetaminophen (TYLENOL) tablet 650 mg  650 mg Oral Q6H PRN   • calcium carbonate (TUMS) chewable tablet 500 mg  500 mg Oral Daily PRN   • DULoxetine (CYMBALTA) delayed release capsule 60 mg  60 mg Oral Daily   • enoxaparin (LOVENOX) subcutaneous injection 30 mg  30 mg Subcutaneous Daily   • HYDROmorphone (DILAUDID) injection 0 5 mg  0 5 mg Intravenous Q3H PRN   • methylPREDNISolone sodium succinate (Solu-MEDROL) injection 20 mg  20 mg Intravenous Q8H [facility]   • metroNIDAZOLE (FLAGYL) IVPB (premix) 500 mg 100 mL  500 mg Intravenous Q8H   • ondansetron (ZOFRAN) injection 4 mg  4 mg Intravenous Q8H PRN   • ondansetron (ZOFRAN-ODT) dispersible tablet 4 mg  4 mg Oral Q6H PRN   • oxyCODONE-acetaminophen (PERCOCET) 5-325 mg per tablet 1 tablet  1 tablet Oral Q6H PRN   • pantoprazole (PROTONIX) injection 40 mg  40 mg Intravenous Q12H [facility]   • saccharomyces boulardii (FLORASTOR) capsule 250 mg  250 mg Oral BID   • sodium chloride 0 9 % infusion  125 mL/hr Intravenous Continuous       Allergies   Allergen Reactions   • Cefazolin Hives     Other reaction(s): Arrythmia (Abnormal Heartbeat), Rash Maculopapular   • Mesalamine GI Intolerance     Other reaction(s): Pancreatitis  [facility] - [id number]: pancreatitis           Objective     Blood pressure 133/76, pulse 81, temperature 97 5 °F (36 4 °C), temperature source Oral, resp  rate 16, height 5' 9" (1 753 m), weight 82 6 kg (182 lb), SpO2 95 %  Intake/Output Summary (Last 24 hours) at 10/1/2022 1311  Last data filed at 10/1/2022 1201  Gross per 24 hour   Intake 2200 ml   Output 1875 ml   Net 325 ml         PHYSICAL EXAM:      General Appearance:   Alert, cooperative, no distress, appears stated age    HEENT:   Normocephalic, atraumatic, anicteric, no oropharyngeal thrush present      Neck:  Supple, symmetrical, trachea midline, no adenopathy;    thyroid: no enlargement/tenderness/nodules; no carotid  bruit or JVD    Lungs:   Clear to auscultation bilaterally; no rales, rhonchi or wheezing; respirations unlabored    Heart[de-identified]   S1 and S2 normal; regular rate and rhythm; no murmur, rub, or gallop     Abdomen:   Soft, non-tender, non-distended; decreased bowel sounds however some positive bowel sounds in the right lower quadrant; no masses, no organomegaly    Genitalia:   Deferred  Rectal:   Deferred    Extremities:  No cyanosis, clubbing or edema    Pulses:  2+ and symmetric all extremities    Skin:  Skin color, texture, turgor normal, no rashes or lesions    Lymph nodes:  No palpable cervical, axillary or inguinal lymphadenopathy        Lab Results:   Results from last 7 days   Lab Units 10/01/22  0536   WBC Thousand/uL 11 74*   HEMOGLOBIN g/dL 10 0*   HEMATOCRIT % 33 6*   PLATELETS Thousands/uL 415*   NEUTROS PCT % 89*   LYMPHS PCT % 7*   MONOS PCT % 3*   EOS PCT % 0     Results from last 7 days   Lab Units 10/01/22  0536   POTASSIUM mmol/L 4 2   CHLORIDE mmol/L 102   CO2 mmol/L 27   BUN mg/dL 13   CREATININE mg/dL 0 94   CALCIUM mg/dL 8 4   ALK PHOS U/L 56   ALT U/L 24   AST U/L <5*     Results from last 7 days   Lab Units 09/30/22  0957   INR  1 03     Results from last 7 days   Lab Units 09/30/22  0957   LIPASE u/L 160       Imaging Studies: I have personally reviewed pertinent imaging studies  XR abdomen obstruction series    Result Date: 9/30/2022  Impression: Findings suggesting high-grade small bowel obstruction with distended bowel loops and air-fluid levels  Workstation performed: [id number]           Patient was seen and examined by Dr Kiana Rico  All suazo medical decisions were made by Dr Kiana Rico  Thank you for allowing us to participate in the care of this present patient  We will follow-up with you closely

## 2022-10-01 NOTE — PLAN OF CARE
Problem: METABOLIC, FLUID AND ELECTROLYTES - ADULT  Goal: Electrolytes maintained within normal limits  Description: INTERVENTIONS:  - Monitor labs and assess patient for signs and symptoms of electrolyte imbalances  - Administer electrolyte replacement as ordered  - Monitor response to electrolyte replacements, including repeat lab results as appropriate  - Instruct patient on fluid and nutrition as appropriate  Outcome: Progressing  Goal: Fluid balance maintained  Description: INTERVENTIONS:  - Monitor labs   - Monitor I/O and WT  - Instruct patient on fluid and nutrition as appropriate  - Assess for signs & symptoms of volume excess or deficit  Outcome: Progressing     Problem: PAIN - ADULT  Goal: Verbalizes/displays adequate comfort level or baseline comfort level  Description: Interventions:  - Encourage patient to monitor pain and request assistance  - Assess pain using appropriate pain scale  - Administer analgesics based on type and severity of pain and evaluate response  - Implement non-pharmacological measures as appropriate and evaluate response  - Consider cultural and social influences on pain and pain management  - Notify physician/advanced practitioner if interventions unsuccessful or patient reports new pain  Outcome: Progressing     Problem: GASTROINTESTINAL - ADULT  Goal: Minimal or absence of nausea and/or vomiting  Description: INTERVENTIONS:  - Administer IV fluids if ordered to ensure adequate hydration  - Maintain NPO status until nausea and vomiting are resolved  - Nasogastric tube if ordered  - Administer ordered antiemetic medications as needed  - Provide nonpharmacologic comfort measures as appropriate  - Advance diet as tolerated, if ordered  - Consider nutrition services referral to assist patient with adequate nutrition and appropriate food choices  Outcome: Progressing  Goal: Maintains or returns to baseline bowel function  Description: INTERVENTIONS:  - Assess bowel function  - Encourage oral fluids to ensure adequate hydration  - Administer IV fluids if ordered to ensure adequate hydration  - Administer ordered medications as needed  - Encourage mobilization and activity  - Consider nutritional services referral to assist patient with adequate nutrition and appropriate food choices  Outcome: Progressing  Goal: Maintains adequate nutritional intake  Description: INTERVENTIONS:  - Monitor percentage of each meal consumed  - Identify factors contributing to decreased intake, treat as appropriate  - Assist with meals as needed  - Monitor I&O, weight, and lab values if indicated  - Obtain nutrition services referral as needed  Outcome: Progressing

## 2022-10-01 NOTE — ASSESSMENT & PLAN NOTE
Currently stable,  Repeated her bowel movement of greater than 3 oz of feces, nonbloody and positive for gas  Reported with abdominal pain, control with Dilaudid, counseled can slow down GI cilia  Serial AXR  For patient continues to refused NG tube for decompression  GI recs for transfer to [address] [place] [address] if worsening

## 2022-10-01 NOTE — ASSESSMENT & PLAN NOTE
Improving,  Meets criteria on admission with elevated WBCs (possibly secondary to steroid home consumption), tachycardic  WBC trending down  Continue IV metronidazole  GI consulted, appreciate recs, consider ID recs  Pancultures- pending, urine cultures- pending  Will monitor

## 2022-10-02 ENCOUNTER — APPOINTMENT (OUTPATIENT)
Dept: RADIOLOGY | Facility: HOSPITAL | Age: 27
DRG: 872 | End: 2022-10-02
Payer: COMMERCIAL

## 2022-10-02 PROBLEM — R79.89 ELEVATED LACTIC ACID LEVEL: Status: RESOLVED | Noted: 2022-10-01 | Resolved: 2022-10-02

## 2022-10-02 LAB
ALBUMIN SERPL BCP-MCNC: 3.1 G/DL (ref 3.5–5)
ALP SERPL-CCNC: 50 U/L (ref 46–116)
ALT SERPL W P-5'-P-CCNC: 19 U/L (ref 12–78)
ANION GAP SERPL CALCULATED.3IONS-SCNC: 5 MMOL/L (ref 4–13)
AST SERPL W P-5'-P-CCNC: 7 U/L (ref 5–45)
BASOPHILS # BLD AUTO: 0.01 THOUSANDS/ΜL (ref 0–0.1)
BASOPHILS NFR BLD AUTO: 0 % (ref 0–1)
BILIRUB SERPL-MCNC: 0.69 MG/DL (ref 0.2–1)
BUN SERPL-MCNC: 11 MG/DL (ref 5–25)
CALCIUM ALBUM COR SERPL-MCNC: 9.1 MG/DL (ref 8.3–10.1)
CALCIUM SERPL-MCNC: 8.4 MG/DL (ref 8.3–10.1)
CHLORIDE SERPL-SCNC: 104 MMOL/L (ref 96–108)
CO2 SERPL-SCNC: 28 MMOL/L (ref 21–32)
CREAT SERPL-MCNC: 0.82 MG/DL (ref 0.6–1.3)
EOSINOPHIL # BLD AUTO: 0 THOUSAND/ΜL (ref 0–0.61)
EOSINOPHIL NFR BLD AUTO: 0 % (ref 0–6)
ERYTHROCYTE [DISTWIDTH] IN BLOOD BY AUTOMATED COUNT: 16.7 % (ref 11.6–15.1)
GFR SERPL CREATININE-BSD FRML MDRD: 119 ML/MIN/1.73SQ M
GLUCOSE SERPL-MCNC: 124 MG/DL (ref 65–140)
HCT VFR BLD AUTO: 33.4 % (ref 36.5–49.3)
HGB BLD-MCNC: 10.2 G/DL (ref 12–17)
IMM GRANULOCYTES # BLD AUTO: 0.03 THOUSAND/UL (ref 0–0.2)
IMM GRANULOCYTES NFR BLD AUTO: 0 % (ref 0–2)
LACTATE SERPL-SCNC: 1.1 MMOL/L (ref 0.5–2)
LYMPHOCYTES # BLD AUTO: 0.83 THOUSANDS/ΜL (ref 0.6–4.47)
LYMPHOCYTES NFR BLD AUTO: 8 % (ref 14–44)
MCH RBC QN AUTO: 19.2 PG (ref 26.8–34.3)
MCHC RBC AUTO-ENTMCNC: 30.5 G/DL (ref 31.4–37.4)
MCV RBC AUTO: 63 FL (ref 82–98)
MONOCYTES # BLD AUTO: 0.52 THOUSAND/ΜL (ref 0.17–1.22)
MONOCYTES NFR BLD AUTO: 5 % (ref 4–12)
NEUTROPHILS # BLD AUTO: 8.61 THOUSANDS/ΜL (ref 1.85–7.62)
NEUTS SEG NFR BLD AUTO: 87 % (ref 43–75)
NRBC BLD AUTO-RTO: 0 /100 WBCS
PLATELET # BLD AUTO: 420 THOUSANDS/UL (ref 149–390)
PMV BLD AUTO: 8.7 FL (ref 8.9–12.7)
POTASSIUM SERPL-SCNC: 4.2 MMOL/L (ref 3.5–5.3)
PROT SERPL-MCNC: 6.2 G/DL (ref 6.4–8.4)
RBC # BLD AUTO: 5.3 MILLION/UL (ref 3.88–5.62)
REF LAB TEST METHOD: NORMAL
SODIUM SERPL-SCNC: 137 MMOL/L (ref 135–147)
TEST INTERPRETATION: NORMAL
TPMT RBC-CCNC: 26.5 UNITS/ML RBC
WBC # BLD AUTO: 10 THOUSAND/UL (ref 4.31–10.16)

## 2022-10-02 PROCEDURE — 83605 ASSAY OF LACTIC ACID: CPT | Performed by: STUDENT IN AN ORGANIZED HEALTH CARE EDUCATION/TRAINING PROGRAM

## 2022-10-02 PROCEDURE — 99232 SBSQ HOSP IP/OBS MODERATE 35: CPT | Performed by: INTERNAL MEDICINE

## 2022-10-02 PROCEDURE — C9113 INJ PANTOPRAZOLE SODIUM, VIA: HCPCS | Performed by: NURSE PRACTITIONER

## 2022-10-02 PROCEDURE — 74022 RADEX COMPL AQT ABD SERIES: CPT

## 2022-10-02 PROCEDURE — 85025 COMPLETE CBC W/AUTO DIFF WBC: CPT | Performed by: STUDENT IN AN ORGANIZED HEALTH CARE EDUCATION/TRAINING PROGRAM

## 2022-10-02 PROCEDURE — 80053 COMPREHEN METABOLIC PANEL: CPT | Performed by: STUDENT IN AN ORGANIZED HEALTH CARE EDUCATION/TRAINING PROGRAM

## 2022-10-02 RX ADMIN — SODIUM CHLORIDE 125 ML/HR: 0.9 INJECTION, SOLUTION INTRAVENOUS at 23:11

## 2022-10-02 RX ADMIN — SODIUM CHLORIDE 125 ML/HR: 0.9 INJECTION, SOLUTION INTRAVENOUS at 02:52

## 2022-10-02 RX ADMIN — METHYLPREDNISOLONE SODIUM SUCCINATE 20 MG: 40 INJECTION, POWDER, FOR SOLUTION INTRAMUSCULAR; INTRAVENOUS at 05:02

## 2022-10-02 RX ADMIN — HYDROMORPHONE HYDROCHLORIDE 0.5 MG: 1 INJECTION, SOLUTION INTRAMUSCULAR; INTRAVENOUS; SUBCUTANEOUS at 08:24

## 2022-10-02 RX ADMIN — HYDROMORPHONE HYDROCHLORIDE 0.5 MG: 1 INJECTION, SOLUTION INTRAMUSCULAR; INTRAVENOUS; SUBCUTANEOUS at 01:38

## 2022-10-02 RX ADMIN — OXYCODONE HYDROCHLORIDE AND ACETAMINOPHEN 1 TABLET: 5; 325 TABLET ORAL at 12:40

## 2022-10-02 RX ADMIN — METRONIDAZOLE 500 MG: 500 INJECTION, SOLUTION INTRAVENOUS at 05:02

## 2022-10-02 RX ADMIN — ENOXAPARIN SODIUM 30 MG: 30 INJECTION SUBCUTANEOUS at 08:24

## 2022-10-02 RX ADMIN — METHYLPREDNISOLONE SODIUM SUCCINATE 20 MG: 40 INJECTION, POWDER, FOR SOLUTION INTRAMUSCULAR; INTRAVENOUS at 14:19

## 2022-10-02 RX ADMIN — DULOXETINE HYDROCHLORIDE 60 MG: 60 CAPSULE, DELAYED RELEASE ORAL at 08:24

## 2022-10-02 RX ADMIN — METRONIDAZOLE 500 MG: 500 INJECTION, SOLUTION INTRAVENOUS at 21:39

## 2022-10-02 RX ADMIN — HYDROMORPHONE HYDROCHLORIDE 0.5 MG: 1 INJECTION, SOLUTION INTRAMUSCULAR; INTRAVENOUS; SUBCUTANEOUS at 18:32

## 2022-10-02 RX ADMIN — Medication 250 MG: at 18:26

## 2022-10-02 RX ADMIN — PANTOPRAZOLE SODIUM 40 MG: 40 INJECTION, POWDER, FOR SOLUTION INTRAVENOUS at 08:24

## 2022-10-02 RX ADMIN — METRONIDAZOLE 500 MG: 500 INJECTION, SOLUTION INTRAVENOUS at 14:19

## 2022-10-02 RX ADMIN — HYDROMORPHONE HYDROCHLORIDE 0.5 MG: 1 INJECTION, SOLUTION INTRAMUSCULAR; INTRAVENOUS; SUBCUTANEOUS at 11:37

## 2022-10-02 RX ADMIN — SODIUM CHLORIDE 125 ML/HR: 0.9 INJECTION, SOLUTION INTRAVENOUS at 14:29

## 2022-10-02 RX ADMIN — HYDROMORPHONE HYDROCHLORIDE 0.5 MG: 1 INJECTION, SOLUTION INTRAMUSCULAR; INTRAVENOUS; SUBCUTANEOUS at 15:30

## 2022-10-02 RX ADMIN — PANTOPRAZOLE SODIUM 40 MG: 40 INJECTION, POWDER, FOR SOLUTION INTRAVENOUS at 21:38

## 2022-10-02 RX ADMIN — HYDROMORPHONE HYDROCHLORIDE 0.5 MG: 1 INJECTION, SOLUTION INTRAMUSCULAR; INTRAVENOUS; SUBCUTANEOUS at 05:02

## 2022-10-02 RX ADMIN — Medication 250 MG: at 08:24

## 2022-10-02 RX ADMIN — METHYLPREDNISOLONE SODIUM SUCCINATE 20 MG: 40 INJECTION, POWDER, FOR SOLUTION INTRAMUSCULAR; INTRAVENOUS at 21:38

## 2022-10-02 RX ADMIN — HYDROMORPHONE HYDROCHLORIDE 0.5 MG: 1 INJECTION, SOLUTION INTRAMUSCULAR; INTRAVENOUS; SUBCUTANEOUS at 21:38

## 2022-10-02 RX ADMIN — OXYCODONE HYDROCHLORIDE AND ACETAMINOPHEN 1 TABLET: 5; 325 TABLET ORAL at 23:13

## 2022-10-02 NOTE — ASSESSMENT & PLAN NOTE
Status post bowel chest PE today which was negative for stricture or inflammation  Diet has been advanced to solids  Antibiotics and steroids as listed

## 2022-10-02 NOTE — ASSESSMENT & PLAN NOTE
Patient presented to the ER with abdominal pain   · Continue 20 mg IV Solu-Medrol q 8 hours  · CT ab: Post colectomy  The neorectum is dilated with mural thickening  Distal ileum with wall thickening  There is a second bowel anastomosis slightly proximal to the spine  Dilated Preanastomotic bowel loop  · Recent obstruction series with decrease in bowel distension  · Continue IV Flagyl  · GI recommending starting biologics as outpatient as he was previously on biologic therapy for ankylosing spondylitis which was discontinued earlier this year    Since then patient has been admitted multiple times with abdominal pain

## 2022-10-02 NOTE — PROGRESS NOTES
Marta U  64   Progress Note - Mio Rapp 1995, 32 y o  male MRN: [id number]  Unit/Bed#: [address] [place] [address] Encounter: [id number]  Primary Care Provider: Obi Solis DO   Date and time admitted to hospital: 9/30/2022  9:31 AM    Sepsis without acute organ dysfunction [facility])  Assessment & Plan  Improving,  Meets criteria on admission with elevated WBCs (possibly secondary to steroid home consumption), tachycardic  WBC trending down  Continue IV metronidazole  GI consulted, appreciate recs  Pancultures- NGTD 24h, urine cultures- pending  Will monitor    SBO (small bowel obstruction) (HCC)  Assessment & Plan  Currently stable,  Repeated bowel movement, nonbloody and positive for gas  control with Dilaudid, counseled can slow down GI cilia  Serial AXR  For patient continues to refused NG tube for decompression  GI recs for transfer to [facility] if worsening    Chronic ulcerative pancolitis (HCC)  Assessment & Plan  Chronic,   recurrent flare,   Continue 40 mg IV Solu-Medrol   CT ab:Post colectomy  The neorectum is dilated with mural thickening again noted  Distal ileum also demonstrates wall thickening  There is a second bowel anastomosis slightly proximal to the spine  Preanastomotic bowel loop is also dilated  Repeat KUB-Significant gaseous distention of bowel loops, unchanged in pattern from examination of one day earlier      GI recs:  Diagnosed with UC status post proctocolectomy, complicated with rectal pouchitis and strictures  EGD may demonstrate Crohn's  Followed by surgeon at [facility] (Dr Keith Spencer)- if high grade obx, consider transfer to [facility]  In the interim, continue flagyl, IV solumedrol 20 mg TID  Serial abdominal exams  ADAT Likely will need long term biologic therapy (previously exposed to [place])      * 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[facility] [address] )  Assessment & Plan  Unresolved,   Dilaudid scheduled and breakthrough pain meds  Antibiotics and steroids as listed        VTE Pharmacologic Prophylaxis: Moderate Risk (Score 3-4) - Pharmacological DVT Prophylaxis Ordered: enoxaparin (Lovenox)  Patient Centered Rounds: I performed bedside rounds with nursing staff today  Discussions with Specialists or Other Care Team Provider:  GI    Education and Discussions with Family / Patient: Patient declined call to   Time Spent for Care: 30 minutes  More than 50% of total time spent on counseling and coordination of care as described above  Current Length of Stay: 2 day(s)  Current Patient Status: Inpatient   Certification Statement: The patient will continue to require additional inpatient hospital stay due to Clinical course  Discharge Plan: Anticipate discharge in 24-48 hrs to home  Code Status: Level 1 - Full Code    Subjective:   Patient seen and was laying in bed alert and oriented  Patient reported semi loose bowel movement today and gas  Patient reported abdominal pain 7/10  Patient reported he will call parents with updates  Objective:     Vitals:   Temp (24hrs), Av 8 °F (36 6 °C), Min:97 5 °F (36 4 °C), Max:98 6 °F (37 °C)    Temp:  [97 5 °F (36 4 °C)-98 6 °F (37 °C)] 97 5 °F (36 4 °C)  HR:  [] 106  Resp:  [17-18] 17  BP: (135-172)/() 140/75  SpO2:  [91 %-98 %] 98 %  Body mass index is 26 88 kg/m²  Input and Output Summary (last 24 hours): Intake/Output Summary (Last 24 hours) at 10/2/2022 1800  Last data filed at 10/2/2022 0701  Gross per 24 hour   Intake 3200 ml   Output --   Net 3200 ml       Physical Exam:   Physical Exam  Vitals and nursing note reviewed  Constitutional:       Appearance: He is well-developed  HENT:      Head: Normocephalic and atraumatic  Eyes:      Conjunctiva/sclera: Conjunctivae normal    Cardiovascular:      Rate and Rhythm: Normal rate and regular rhythm  Heart sounds: No murmur heard  Pulmonary:      Effort: Pulmonary effort is normal  No respiratory distress  Breath sounds: Normal breath sounds  No wheezing  Abdominal:      General: Bowel sounds are normal  There is no distension  Palpations: Abdomen is soft  There is no mass  Tenderness: There is no abdominal tenderness  There is no guarding  Musculoskeletal:      Cervical back: Neck supple  Skin:     General: Skin is warm and dry  Neurological:      Mental Status: He is alert and oriented to person, place, and time  Psychiatric:         Mood and Affect: Mood normal          Thought Content: Thought content normal          Judgment: Judgment normal           Additional Data:     Labs:  Results from last 7 days   Lab Units 10/02/22  0620   WBC Thousand/uL 10 00   HEMOGLOBIN g/dL 10 2*   HEMATOCRIT % 33 4*   PLATELETS Thousands/uL 420*   NEUTROS PCT % 87*   LYMPHS PCT % 8*   MONOS PCT % 5   EOS PCT % 0     Results from last 7 days   Lab Units 10/02/22  0620   SODIUM mmol/L 137   POTASSIUM mmol/L 4 2   CHLORIDE mmol/L 104   CO2 mmol/L 28   BUN mg/dL 11   CREATININE mg/dL 0 82   ANION GAP mmol/L 5   CALCIUM mg/dL 8 4   ALBUMIN g/dL 3 1*   TOTAL BILIRUBIN mg/dL 0 69   ALK PHOS U/L 50   ALT U/L 19   AST U/L 7   GLUCOSE RANDOM mg/dL 124     Results from last 7 days   Lab Units 09/30/22  0957   INR  1 03             Results from last 7 days   Lab Units 10/02/22  0620 10/01/22  0838 10/01/22  0536 09/30/22  1157   LACTIC ACID mmol/L 1 1 2 7* 2 7* 1 8   PROCALCITONIN ng/ml  --   --  <0 05  --        Lines/Drains:  Invasive Devices  Report    Peripheral Intravenous Line  Duration           Peripheral IV 09/30/22 Right Antecubital 2 days                      Imaging: Reviewed radiology reports from this admission including: abdominal/pelvic CT and xray(s)    Recent Cultures (last 7 days):   Results from last 7 days   Lab Units 09/30/22  1529   BLOOD CULTURE  No Growth at 24 hrs  No Growth at 24 hrs         Last 24 Hours Medication List:   Current Facility-Administered Medications   Medication Dose Route Frequency Provider Last Rate   • acetaminophen  650 mg Oral Q6H PRN [place] Reyez     • calcium carbonate  500 mg Oral Daily PRN [place] Reyez     • DULoxetine  60 mg Oral Daily Ramo Smith MD     • enoxaparin  30 mg Subcutaneous Daily Ramo Smith MD     • HYDROmorphone  0 5 mg Intravenous Q3H PRN [place] Reyez     • methylPREDNISolone sodium succinate  20 mg Intravenous [facility] Ramo Smith MD     • metroNIDAZOLE  500 mg Intravenous Sonia Johns  mg (10/02/22 1419)   • ondansetron  4 mg Intravenous Q8H PRN Ramo Smith MD     • ondansetron  4 mg Oral Q6H PRN Ramo Smith MD     • oxyCODONE-acetaminophen  1 tablet Oral Q6H PRN [place] Reyez     • pantoprazole  40 mg Intravenous Q12H [address] [place] Reyez     • saccharomyces boulardii  250 mg Oral BID Ramo Smith MD     • sodium chloride  125 mL/hr Intravenous Continuous Ramo Smith  mL/hr (10/02/22 0586)        Today, Patient Was Seen By: Ramo Smith    **Please Note: This note may have been constructed using a voice recognition system  **

## 2022-10-02 NOTE — ASSESSMENT & PLAN NOTE
Improving,  Meets criteria on admission with elevated WBCs (possibly secondary to steroid home consumption), tachycardic  WBC trending down  Continue IV metronidazole  GI consulted, appreciate recs  Pancultures- NGTD 48 h, urine cultures- pending  Will monitor

## 2022-10-02 NOTE — ASSESSMENT & PLAN NOTE
Stable  · Repeated bowel movement, nonbloody and positive for gas  · Abdominal x-ray as noted above  · Patient refused NG tube for decompression

## 2022-10-02 NOTE — PROGRESS NOTES
Progress Note - Gabriella Boss 32 y o  male MRN: [id number]    Unit/Bed#: [address] [address] Encounter: [id number]    Assessment and Plan:   Principal Problem:    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[facility] [address] )  Active Problems:    Chronic ulcerative pancolitis ([facility] [address] )    Sepsis without acute organ dysfunction (HCC)    SBO (small bowel obstruction) (HCC)    Elevated lactic acid level    #1  Crohn's disease with small bowel obstruction: patient previously dx with UC however due to active disease in the small bowel concern for misdiagnosis with actually Crohn's disease, CT scan 9/29 with dilated neorectum with mural thickening and distal ileal thickening, pre-anastomotic bowel loop also dilated  , serial xrays showing improvement in distention, having bowel movements and passing gas, no vomiting, less distention  -suspect this is related to active crohn's rather than anastomotic stricture as flex sig 2 months ago without stricture  -continue IV steroids, will need taper upon d/c  -recommend close outpatient follow up to discuss starting biologics  -due to improvement, can follow up with surgeon outpatient, no need to urgent trasnfer  -advance to clears    ----------------------------------------------------------------------------------------------------------------    Subjective:     Patient improving, moving bowels, no vomiting, less distention    Objective:     Vitals: Blood pressure 135/85, pulse 71, temperature 97 6 °F (36 4 °C), resp  rate 17, height 5' 9" (1 753 m), weight 82 6 kg (182 lb), SpO2 96 %  ,Body mass index is 26 88 kg/m²        Intake/Output Summary (Last 24 hours) at 10/2/2022 1226  Last data filed at 10/2/2022 0701  Gross per 24 hour   Intake 3300 ml   Output --   Net 3300 ml       Physical Exam:     General Appearance: Alert, appears stated age and cooperative  Lungs: Clear to auscultation bilaterally, no rales or rhonchi, no labored breathing/accessory muscle use  Heart: Regular rate and rhythm, S1, S2 normal, no murmur, click, rub or gallop  Abdomen: Soft, non-tender, non-distended; bowel sounds normal; no masses or no organomegaly  Extremities: No cyanosis, clubbing, or edema    Invasive Devices  Report    Peripheral Intravenous Line  Duration           Peripheral IV 09/30/22 Right Antecubital 2 days                Lab Results:  Results from last 7 days   Lab Units 10/02/22  0620   WBC Thousand/uL 10 00   HEMOGLOBIN g/dL 10 2*   HEMATOCRIT % 33 4*   PLATELETS Thousands/uL 420*   NEUTROS PCT % 87*   LYMPHS PCT % 8*   MONOS PCT % 5   EOS PCT % 0     Results from last 7 days   Lab Units 10/02/22  0620   POTASSIUM mmol/L 4 2   CHLORIDE mmol/L 104   CO2 mmol/L 28   BUN mg/dL 11   CREATININE mg/dL 0 82   CALCIUM mg/dL 8 4   ALK PHOS U/L 50   ALT U/L 19   AST U/L 7     Invalid input(s): BILI  Results from last 7 days   Lab Units 09/30/22  0957   INR  1 03     Results from last 7 days   Lab Units 09/30/22  0957   LIPASE u/L 160       Imaging Studies: I have personally reviewed pertinent imaging studies  XR abdomen obstruction series    Result Date: 10/2/2022  Impression: Slight decrease in the degree of bowel distention  Otherwise, the appearance of the chest and abdomen is stable  Workstation performed: [id number]     XR abdomen obstruction series    Result Date: 10/1/2022  Impression: Significant gaseous distention of bowel loops, unchanged in pattern from examination of one day earlier  Bowel gas pattern is nonspecific but is suggestive of ileus  Workstation performed: [id number]     XR abdomen obstruction series    Result Date: 9/30/2022  Impression: Findings suggesting high-grade small bowel obstruction with distended bowel loops and air-fluid levels   Workstation performed: [id number]

## 2022-10-03 PROBLEM — A41.9 SEPSIS WITHOUT ACUTE ORGAN DYSFUNCTION (HCC): Status: RESOLVED | Noted: 2022-01-02 | Resolved: 2022-10-03

## 2022-10-03 LAB
ALBUMIN SERPL BCP-MCNC: 2.9 G/DL (ref 3.5–5)
ALP SERPL-CCNC: 48 U/L (ref 46–116)
ALT SERPL W P-5'-P-CCNC: 18 U/L (ref 12–78)
ANION GAP SERPL CALCULATED.3IONS-SCNC: 6 MMOL/L (ref 4–13)
AST SERPL W P-5'-P-CCNC: 8 U/L (ref 5–45)
BASOPHILS # BLD AUTO: 0.01 THOUSANDS/ΜL (ref 0–0.1)
BASOPHILS NFR BLD AUTO: 0 % (ref 0–1)
BILIRUB SERPL-MCNC: 0.69 MG/DL (ref 0.2–1)
BUN SERPL-MCNC: 10 MG/DL (ref 5–25)
CALCIUM ALBUM COR SERPL-MCNC: 9.1 MG/DL (ref 8.3–10.1)
CALCIUM SERPL-MCNC: 8.2 MG/DL (ref 8.3–10.1)
CHLORIDE SERPL-SCNC: 104 MMOL/L (ref 96–108)
CO2 SERPL-SCNC: 28 MMOL/L (ref 21–32)
CREAT SERPL-MCNC: 0.84 MG/DL (ref 0.6–1.3)
EOSINOPHIL # BLD AUTO: 0 THOUSAND/ΜL (ref 0–0.61)
EOSINOPHIL NFR BLD AUTO: 0 % (ref 0–6)
ERYTHROCYTE [DISTWIDTH] IN BLOOD BY AUTOMATED COUNT: 16.4 % (ref 11.6–15.1)
GFR SERPL CREATININE-BSD FRML MDRD: 118 ML/MIN/1.73SQ M
GLUCOSE SERPL-MCNC: 125 MG/DL (ref 65–140)
HCT VFR BLD AUTO: 33.1 % (ref 36.5–49.3)
HGB BLD-MCNC: 10 G/DL (ref 12–17)
IMM GRANULOCYTES # BLD AUTO: 0.06 THOUSAND/UL (ref 0–0.2)
IMM GRANULOCYTES NFR BLD AUTO: 1 % (ref 0–2)
LYMPHOCYTES # BLD AUTO: 0.89 THOUSANDS/ΜL (ref 0.6–4.47)
LYMPHOCYTES NFR BLD AUTO: 7 % (ref 14–44)
MCH RBC QN AUTO: 19 PG (ref 26.8–34.3)
MCHC RBC AUTO-ENTMCNC: 30.2 G/DL (ref 31.4–37.4)
MCV RBC AUTO: 63 FL (ref 82–98)
MONOCYTES # BLD AUTO: 0.75 THOUSAND/ΜL (ref 0.17–1.22)
MONOCYTES NFR BLD AUTO: 6 % (ref 4–12)
NEUTROPHILS # BLD AUTO: 10.25 THOUSANDS/ΜL (ref 1.85–7.62)
NEUTS SEG NFR BLD AUTO: 86 % (ref 43–75)
NRBC BLD AUTO-RTO: 0 /100 WBCS
PLATELET # BLD AUTO: 409 THOUSANDS/UL (ref 149–390)
PMV BLD AUTO: 9.1 FL (ref 8.9–12.7)
POTASSIUM SERPL-SCNC: 3.8 MMOL/L (ref 3.5–5.3)
PROT SERPL-MCNC: 5.9 G/DL (ref 6.4–8.4)
RBC # BLD AUTO: 5.25 MILLION/UL (ref 3.88–5.62)
SARS-COV-2 RNA RESP QL NAA+PROBE: NEGATIVE
SODIUM SERPL-SCNC: 138 MMOL/L (ref 135–147)
WBC # BLD AUTO: 11.96 THOUSAND/UL (ref 4.31–10.16)

## 2022-10-03 PROCEDURE — U0003 INFECTIOUS AGENT DETECTION BY NUCLEIC ACID (DNA OR RNA); SEVERE ACUTE RESPIRATORY SYNDROME CORONAVIRUS 2 (SARS-COV-2) (CORONAVIRUS DISEASE [COVID-19]), AMPLIFIED PROBE TECHNIQUE, MAKING USE OF HIGH THROUGHPUT TECHNOLOGIES AS DESCRIBED BY CMS-2020-01-R: HCPCS | Performed by: PHYSICIAN ASSISTANT

## 2022-10-03 PROCEDURE — C9113 INJ PANTOPRAZOLE SODIUM, VIA: HCPCS | Performed by: NURSE PRACTITIONER

## 2022-10-03 PROCEDURE — 99232 SBSQ HOSP IP/OBS MODERATE 35: CPT | Performed by: STUDENT IN AN ORGANIZED HEALTH CARE EDUCATION/TRAINING PROGRAM

## 2022-10-03 PROCEDURE — U0005 INFEC AGEN DETEC AMPLI PROBE: HCPCS | Performed by: PHYSICIAN ASSISTANT

## 2022-10-03 PROCEDURE — 80053 COMPREHEN METABOLIC PANEL: CPT | Performed by: STUDENT IN AN ORGANIZED HEALTH CARE EDUCATION/TRAINING PROGRAM

## 2022-10-03 PROCEDURE — 99233 SBSQ HOSP IP/OBS HIGH 50: CPT | Performed by: INTERNAL MEDICINE

## 2022-10-03 PROCEDURE — 85025 COMPLETE CBC W/AUTO DIFF WBC: CPT | Performed by: STUDENT IN AN ORGANIZED HEALTH CARE EDUCATION/TRAINING PROGRAM

## 2022-10-03 RX ADMIN — Medication 250 MG: at 17:58

## 2022-10-03 RX ADMIN — METHYLPREDNISOLONE SODIUM SUCCINATE 20 MG: 40 INJECTION, POWDER, FOR SOLUTION INTRAMUSCULAR; INTRAVENOUS at 21:22

## 2022-10-03 RX ADMIN — Medication 250 MG: at 08:00

## 2022-10-03 RX ADMIN — HYDROMORPHONE HYDROCHLORIDE 0.5 MG: 1 INJECTION, SOLUTION INTRAMUSCULAR; INTRAVENOUS; SUBCUTANEOUS at 18:06

## 2022-10-03 RX ADMIN — HYDROMORPHONE HYDROCHLORIDE 0.5 MG: 1 INJECTION, SOLUTION INTRAMUSCULAR; INTRAVENOUS; SUBCUTANEOUS at 00:58

## 2022-10-03 RX ADMIN — METRONIDAZOLE 500 MG: 500 INJECTION, SOLUTION INTRAVENOUS at 13:42

## 2022-10-03 RX ADMIN — METHYLPREDNISOLONE SODIUM SUCCINATE 20 MG: 40 INJECTION, POWDER, FOR SOLUTION INTRAMUSCULAR; INTRAVENOUS at 13:42

## 2022-10-03 RX ADMIN — METHYLPREDNISOLONE SODIUM SUCCINATE 20 MG: 40 INJECTION, POWDER, FOR SOLUTION INTRAMUSCULAR; INTRAVENOUS at 05:04

## 2022-10-03 RX ADMIN — HYDROMORPHONE HYDROCHLORIDE 0.5 MG: 1 INJECTION, SOLUTION INTRAMUSCULAR; INTRAVENOUS; SUBCUTANEOUS at 04:00

## 2022-10-03 RX ADMIN — ENOXAPARIN SODIUM 30 MG: 30 INJECTION SUBCUTANEOUS at 08:00

## 2022-10-03 RX ADMIN — HYDROMORPHONE HYDROCHLORIDE 0.5 MG: 1 INJECTION, SOLUTION INTRAMUSCULAR; INTRAVENOUS; SUBCUTANEOUS at 11:25

## 2022-10-03 RX ADMIN — SODIUM CHLORIDE 125 ML/HR: 0.9 INJECTION, SOLUTION INTRAVENOUS at 14:56

## 2022-10-03 RX ADMIN — PANTOPRAZOLE SODIUM 40 MG: 40 INJECTION, POWDER, FOR SOLUTION INTRAVENOUS at 08:00

## 2022-10-03 RX ADMIN — SODIUM CHLORIDE 125 ML/HR: 0.9 INJECTION, SOLUTION INTRAVENOUS at 21:21

## 2022-10-03 RX ADMIN — HYDROMORPHONE HYDROCHLORIDE 0.5 MG: 1 INJECTION, SOLUTION INTRAMUSCULAR; INTRAVENOUS; SUBCUTANEOUS at 14:49

## 2022-10-03 RX ADMIN — METRONIDAZOLE 500 MG: 500 INJECTION, SOLUTION INTRAVENOUS at 05:04

## 2022-10-03 RX ADMIN — METRONIDAZOLE 500 MG: 500 INJECTION, SOLUTION INTRAVENOUS at 21:22

## 2022-10-03 RX ADMIN — DULOXETINE HYDROCHLORIDE 60 MG: 60 CAPSULE, DELAYED RELEASE ORAL at 08:00

## 2022-10-03 RX ADMIN — HYDROMORPHONE HYDROCHLORIDE 0.5 MG: 1 INJECTION, SOLUTION INTRAMUSCULAR; INTRAVENOUS; SUBCUTANEOUS at 08:00

## 2022-10-03 RX ADMIN — HYDROMORPHONE HYDROCHLORIDE 0.5 MG: 1 INJECTION, SOLUTION INTRAMUSCULAR; INTRAVENOUS; SUBCUTANEOUS at 21:22

## 2022-10-03 RX ADMIN — PANTOPRAZOLE SODIUM 40 MG: 40 INJECTION, POWDER, FOR SOLUTION INTRAVENOUS at 21:22

## 2022-10-03 NOTE — UTILIZATION REVIEW
Initial Clinical Review    Admission: Date/Time/Statement:   Admission Orders (From admission, onward)     Ordered        09/30/22 1209  [facility] [place]  [place]                      Orders Placed This Encounter   Procedures   • INPATIENT ADMISSION     Standing Status:   Standing     Number of Occurrences:   1     Order Specific Question:   Level of Care     Answer:   Med Surg [16]     Order Specific Question:   Estimated length of stay     Answer:   More than 2 Midnights     Order Specific Question:   Certification     Answer:   I certify that inpatient services are medically necessary for this patient for a duration of greater than two midnights  See H&P and MD Progress Notes for additional information about the patient's course of treatment  ED Arrival Information     Expected   -    Arrival   9/30/2022 09:28    Acuity   Urgent            Means of arrival   Walk-In    Escorted by   Self    Service   Hospitalist    Admission type   Emergency            Arrival complaint   abd pain, hx of bowel obstruction           Chief Complaint   Patient presents with   • Abdominal Pain     Thinks he has a bowel obstruction     Initial Presentation:   32 yom to ER from home c/o increased abd pain & distention, unable to pass flatus or BM  Recently admitted by me 10 days ago with increased abdominal pain, and ileal pouchitis  He is currently on a steroid wean  He was seen 2 days ago in this ED with increased pain and rectal bleeding  Patient responded to treatment in the ED was treated and released  Hx ulcerative colitis, status post colectomy with ileal pouch formation and subsequent complications  Presents with absent BS, distended abd with generalized tenderness  Admission work-up showing SBO on imaging, leukocytosis, hypokalemia, elevated lactic acid, CRP  Admitted to inpatient status for ileal pouchitis  NPO, started on IVABT, IV steroids, IV PPI,  & IVF, refused NGT      Date: 10/1/22   Day 2:   High grade SBO on imaging, still refusing NGT  Repeat KUB ordered  Remains on IV Flagyl, IV steroids, IVF at this time  Using IV Dilaudid for pain control, IV Zofran for nausea  ED Triage Vitals   Temperature Pulse Respirations Blood Pressure SpO2   09/30/22 0935 09/30/22 0935 09/30/22 0935 09/30/22 0935 09/30/22 0935   98 1 °F (36 7 °C) 103 18 150/83 98 %      Temp Source Heart Rate Source Patient Position - Orthostatic VS BP Location FiO2 (%)   09/30/22 1254 09/30/22 1254 09/30/22 1254 09/30/22 1254 --   Tympanic Monitor Sitting Left arm       Pain Score       09/30/22 0935       9          Wt Readings from Last 1 Encounters:   09/30/22 82 6 kg (182 lb)     Additional Vital Signs:   Date/Time Temp Pulse Resp BP MAP (mmHg) SpO2 O2 Device Patient Position - Orthostatic VS   10/01/22 02:54:27 -- 73 -- 139/77 98 94 % -- --   09/30/22 22:53:13 97 7 °F (36 5 °C) 65 20 140/108 Abnormal  119 95 % -- --   09/30/22 20:23:05 98 °F (36 7 °C) 77 19 131/88 102 95 % None (Room air) Lying   09/30/22 13:22:42 97 7 °F (36 5 °C) 82 -- 110/75 87 95 % -- --   09/30/22 1254 99 1 °F (37 3 °C) 105 20 136/77 -- 96 % None (Room air) Sitting   09/30/22 1102 -- 94 18 129/77 -- 96 % -- --   09/30/22 0935 98 1 °F (36 7 °C) 103 18 150/83 -- 98 % -- --     Pertinent Labs/Diagnostic Test Results:   XR abdomen obstruction series   Final Result  (09/30 1114)      Findings suggesting high-grade small bowel obstruction with distended bowel loops and air-fluid levels          Results from last 7 days   Lab Units 10/03/22  0550 10/02/22  0620 10/01/22  0536 09/30/22  0957 09/28/22  2334   WBC Thousand/uL 11 96* 10 00 11 74* 20 79* 13 06*   HEMOGLOBIN g/dL 10 0* 10 2* 10 0* 12 2 12 5   HEMATOCRIT % 33 1* 33 4* 33 6* 41 5 41 7   PLATELETS Thousands/uL 409* 420* 415* 633* 564*   NEUTROS ABS Thousands/µL 10 25* 8 61* 10 59* 16 02* 9 33*     Results from last 7 days   Lab Units 10/03/22  0550 10/02/22  0620 10/01/22  0536 09/30/22  0957 09/28/22  2334   SODIUM mmol/L 138 137 136 145 142   POTASSIUM mmol/L 3 8 4 2 4 2 3 2* 3 1*   CHLORIDE mmol/L 104 104 102 104 104   CO2 mmol/L 28 28 27 33* 31   ANION GAP mmol/L 6 5 7 8 7   BUN mg/dL 10 11 13 14 11   CREATININE mg/dL 0 84 0 82 0 94 1 27 1 11   EGFR ml/min/1 73sq m 118 119 109 75 89   CALCIUM mg/dL 8 2* 8 4 8 4 9 1 9 0     Results from last 7 days   Lab Units 10/03/22  0550 10/02/22  0620 10/01/22  0536 09/30/22  0957 09/28/22  2334   AST U/L 8 7 <5* 10 17   ALT U/L 18 19 24 30 31   ALK PHOS U/L 48 50 56 73 83   TOTAL PROTEIN g/dL 5 9* 6 2* 6 3* 7 4 7 2   ALBUMIN g/dL 2 9* 3 1* 3 1* 3 8 3 5   TOTAL BILIRUBIN mg/dL 0 69 0 69 0 71 0 59 0 45     Results from last 7 days   Lab Units 10/03/22  0550 10/02/22  0620 10/01/22  0536 09/30/22  0957 09/28/22  2334   GLUCOSE RANDOM mg/dL 125 124 123 114 102     Results from last 7 days   Lab Units 09/30/22  0957   PROTIME seconds 13 7   INR  1 03   PTT seconds 26     Results from last 7 days   Lab Units 10/01/22  0536   PROCALCITONIN ng/ml <0 05     Results from last 7 days   Lab Units 10/02/22  0620 10/01/22  0838 10/01/22  0536 09/30/22  1157 09/30/22  0957   LACTIC ACID mmol/L 1 1 2 7* 2 7* 1 8 2 1*         Results from last 7 days   Lab Units 09/30/22  0957   LIPASE u/L 160         Results from last 7 days   Lab Units 09/30/22  1500   CLARITY UA  Clear   COLOR UA  Yellow   SPEC GRAV UA  >=1 030   PH UA  6 0   GLUCOSE UA mg/dl Negative   KETONES UA mg/dl Negative   BLOOD UA  Trace-lysed*   PROTEIN UA mg/dl Negative   NITRITE UA  Negative   BILIRUBIN UA  Negative   UROBILINOGEN UA E U /dl 0 2   LEUKOCYTES UA  Negative   WBC UA /hpf 0-1   RBC UA /hpf 0-1   BACTERIA UA /hpf Moderate*   EPITHELIAL CELLS WET PREP /hpf None Seen   MUCUS THREADS  Occasional*     Results from last 7 days   Lab Units 09/30/22  1500   AMPH/METH  Negative   BARBITURATE UR  Negative   BENZODIAZEPINE UR  Negative   COCAINE UR  Negative   METHADONE URINE  Negative   OPIATE UR  Positive*   PCP UR  Negative   THC UR  Negative Results from last 7 days   Lab Units 09/30/22  1529   BLOOD CULTURE  No Growth at 48 hrs  No Growth at 48 hrs       ED Treatment:   Medication Administration from 09/30/2022 0928 to 09/30/2022 1318       Date/Time Order Dose Route Action     09/30/2022 0957 sodium chloride 0 9 % bolus 1,000 mL 1,000 mL Intravenous New Bag     09/30/2022 0957 ondansetron (ZOFRAN) injection 4 mg 4 mg Intravenous Given     09/30/2022 0957 HYDROmorphone (DILAUDID) injection 2 mg 2 mg Intravenous Given     09/30/2022 1125 ondansetron (ZOFRAN) injection 4 mg 4 mg Intravenous Given     09/30/2022 1256 HYDROmorphone (DILAUDID) injection 2 mg 2 mg Intravenous Given        Past Medical History:   Diagnosis Date   • Ankylosing spondylitis (HCC)    • Anxiety    • Bowel obstruction ([facility])    • Clostridium difficile colitis 9/13/2018   • Colitis    • 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[facility] ) 9/13/2018   • Pancreatitis    • Ulcerative colitis ([facility] )      Present on Admission:  • Chronic ulcerative pancolitis ([facility])  • 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[facility] )    Admitting Diagnosis: Bowel obstruction ([facility]) [K56 609]  Ulcerative colitis ([facility] ) [K51 90]  Abdominal pain [R10 9]  Age/Sex: 32 y o  male  Admission Orders:  NGT  Consult GI  Pt eval & tx  scd    Scheduled Medications:  DULoxetine, 60 mg, Oral, Daily  enoxaparin, 30 mg, Subcutaneous, Daily  methylPREDNISolone sodium succinate, 20 mg, Intravenous, Q8H [facility]  metroNIDAZOLE, 500 mg, Intravenous, Q8H  pantoprazole, 40 mg, Intravenous, Q12H [facility]  saccharomyces boulardii, 250 mg, Oral, BID    Continuous IV Infusions:  sodium chloride, 125 mL/hr, Intravenous, Continuous    PRN Meds:  acetaminophen, 650 mg, Oral, Q6H PRN  calcium carbonate, 500 mg, Oral, Daily PRN  HYDROmorphone, 0 5 mg, Intravenous, Q3H PRN, 9/30 x2, 10/1 x4  ondansetron, 4 mg, Intravenous, Q8H PRN, 9/30 x1, 10/1 x1  ondansetron, 4 mg, Oral, Q6H PRN  oxyCODONE-acetaminophen, 1 tablet, Oral, Q6H PRN    Network Utilization Review Department  ATTENTION: Please call with any questions or concerns to [phone] and carefully listen to the prompts so that you are directed to the right person  All voicemails are confidential   [facility] all requests for admission clinical reviews, approved or denied determinations and any other requests to dedicated fax number below belonging to the campus where the patient is receiving treatment   List of dedicated fax numbers for the Facilities:  [address] DENIALS (Administrative/Medical Necessity) [phone]   [address] (Maternity/NICU/Pediatrics) [phone]   917 Ashtyn Perez [phone]   [facility] [address] [phone]   1306 [facility] [address] [address] Carmine Cortes 28 [phone]   1559 [facility] Leonora Martinez [facility] 134 815 [facility] [phone]

## 2022-10-03 NOTE — CASE MANAGEMENT
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 2022   • Anemia 2022   • Polymicrobial after streptococcal bacteremia 2022   • Enteritis 2022   • Sepsis without acute organ dysfunction ([address] ) 2022   • Ankylosing spondylitis ([address] ) 2022   • Elevated LFTs 2022   • Arm and leg movements, uncontrollable 2021   • Seizure-like activity ([address] ) 2021   • Arthralgia 09/15/2020   • Sacroiliac joint dysfunction of right side 2020   • Left groin pain 2019   • Left-sided low back pain without sciatica    • Complications of intestinal pouch ([address] ) 2019   • History of ulcerative colitis 2019   • CAR (generalized anxiety disorder) 10/19/2018   • BMI 26 0-26 9,adult 10/19/2018   • Chronic ulcerative pancolitis ([address] ) 2018   • 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[address] ) 2018   • Stricture of rectum 2018      LOS (days): 3  Geometric Mean LOS (GMLOS) (days):   Days to GMLOS:     OBJECTIVE:  PATIENT READMITTED TO HOSPITAL  Risk of Unplanned Readmission Score: 53 72      Current admission status: Inpatient     Preferred Pharmacy:   [address] [place] [address]  Phone: [phone] Fax: [phone]    Primary Care Provider: Lorena Blanton DO    Primary Insurance: BLUE CROSS  Secondary Insurance:     ASSESSMENT:  Zhanna [employer]     Lana Cruz Representative - Father   Primary Phone: [phone] (Mobile)  Home Phone: [phone]            Readmission Root Cause  30 Day Readmission: Yes  Who directed you to return to the hospital?: Self  Did you understand whom to contact if you had questions or problems?: Yes  Did you get your prescriptions before you left the hospital?: No  Reason[de-identified] Not preferred pharmacy, Preference for own pharmacy  Were you able to get your prescriptions filled when you left the hospital?: Yes  Did you take your medications as prescribed?: Yes  Were you able to get to your follow-up appointments?: No  Reason[de-identified] Readmitted prior to appointment  During previous admission, was a post-acute recommendation made?: No  Patient was readmitted due to: Abdominal pain r/o SBO  Action Plan: Medical mgmt, GI consult    Patient Information  Admitted from[de-identified] Home  Mental Status: Alert  During Assessment patient was accompanied by: Not accompanied during assessment  Assessment information provided by[de-identified] Patient  Primary Caregiver: Self  Support Systems: Parent, Family members  [place] of Residence: [address] [place] [address] do you live in?: [address] entry access options   Select all that apply : Stairs  Number of steps to enter home : 2  Type of Current Residence: 2 story home  In the last 12 months, was there a time when you were not able to pay the mortgage or rent on time?: No  In the last 12 months, how many places have you lived?: 1  In the last 12 months, was there a time when you did not have a steady place to sleep or slept in a shelter (including now)?: No  Homeless/housing insecurity resource given?: N/A  Living Arrangements: Lives w/ Parent(s)    Activities of Daily Living Prior to Admission  Functional Status: Independent  Completes ADLs independently?: Yes  Ambulates independently?: Yes  Does patient use assisted devices?: No  Does patient currently own DME?: No  Does patient have a history of Outpatient Therapy (PT/OT)?: No  Does the patient have a history of Short-Term Rehab?: No  Does patient have a history of HHC?: Yes (Hx with [facility] for home infusion)  Does patient currently have [facility]?: No     Patient Information Continued  Income Source: Employed  Does patient have prescription coverage?: Yes  Within the past 12 months, you worried that your food would run out before you got the money to buy more : Never true  Within the past 12 months, the food you bought just didn't last and you didn't have money to get more : Never true  Food insecurity resource given?: N/A  Does patient receive dialysis treatments?: No  Does patient have a history of substance abuse?: No  Does patient have a history of Mental Health Diagnosis?: No     Means of Transportation  Means of Transport to Appts[de-identified] Drives Self  In the past 12 months, has lack of transportation kept you from medical appointments or from getting medications?: No  In the past 12 months, has lack of transportation kept you from meetings, work, or from getting things needed for daily living?: No  Was application for public transport provided?: N/A    DISCHARGE DETAILS:    Discharge planning discussed with[de-identified] Patient  Freedom of Choice: Yes  Comments - Freedom of Choice: FOC reviewed with patient  Patient's plan is to return home at discharge  He has no questions, concerns, or needs for SW at this time      Contacts  Patient Contacts: Stephanie Norris  Relationship to Patient[de-identified] Family  Contact Method: Phone  Phone Number: [phone]  Reason/Outcome: Emergency [address]         Is the patient interested in [facility] at discharge?: No    DME Referral Provided  Referral made for DME?: No     Would you like to participate in our [address] service program?  : No - Declined    Treatment Team Recommendation: Home  Discharge Destination Plan[de-identified] Home  Transport at Discharge : Family

## 2022-10-03 NOTE — PROGRESS NOTES
[address]  Progress Note - Jamie Hatfield 1995, 32 y o  male MRN: [id number]  Unit/Bed#: [address] [place] [address] Encounter: [id number]  Primary Care Provider: Yosvany Selby DO   Date and time admitted to hospital: 9/30/2022  9:31 AM    * Chronic ulcerative pancolitis (HCC)  Assessment & Plan  Chronic, recurrent flare, Tentative plans for endoscopy/pouchoscopy tomorrow       Continue 40 mg IV Solu-Medrol   CT ab:Post colectomy  The neorectum is dilated with mural thickening again noted  Distal ileum also demonstrates wall thickening  There is a second bowel anastomosis slightly proximal to the spine  Preanastomotic bowel loop is also dilated  Repeat KUB-Significant gaseous distention of bowel loops, unchanged in pattern from examination of one day earlier      GI recs:  Diagnosed with UC status post proctocolectomy, complicated with rectal pouchitis and strictures  EGD may demonstrate Crohn's  Followed by surgeon at [facility] (Dr Oneal Harmon)- if high grade obx, consider transfer to [facility]  In the interim, continue flagyl, IV solumedrol 20 mg TID titrate down on discharge  Serial abdominal exams  ADAT on clears Likely will need long term biologic therapy (previously exposed to [place])      -due to improvement, F/U with surgeon outpatient, consider repeating flex sig as prior flex-sig in August of this year had just shown mild inflammation of the proximal pouch with several small focal ulcers  -now noted on most recent CTs to have distal ileal thickening and patient was previously taking biologic therapy for ankylosing spondylitis which was discontinued earlier this year and then he has had frequent admissions since then        Sepsis without acute organ dysfunction (HCC)-resolved as of 10/3/2022  Assessment & Plan  Improving,  Meets criteria on admission with elevated WBCs (possibly secondary to steroid home consumption), tachycardic  WBC trending down  Continue IV metronidazole  GI consulted, appreciate recs  Pancultures- NGTD 48 h, urine cultures- pending  Will monitor    SBO (small bowel obstruction) ([facility])  Assessment & Plan  Currently stable,  Repeated bowel movement, nonbloody and positive for gas  control with Dilaudid, counseled can slow down GI cilia  Serial AXR  For patient continues to refused NG tube for decompression  GI recs for transfer to [facility] if worsening    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[facility] )  Assessment & Plan  Improving, see above A&P  Dilaudid scheduled and breakthrough pain meds  Antibiotics and steroids as listed  Head without Moore S of 90 out right item number him maybe I will a check what his name a half okay it is worse he from from yet affect is      VTE Pharmacologic Prophylaxis:   Moderate Risk (Score 3-4) - Pharmacological DVT Prophylaxis Ordered: enoxaparin (Lovenox)  Patient Centered Rounds: I performed bedside rounds with nursing staff today  Discussions with Specialists or Other Care Team Provider:  GI    Education and Discussions with Family / Patient: Patient declined call to   Time Spent for Care: 30 minutes  More than 50% of total time spent on counseling and coordination of care as described above  Current Length of Stay: 3 day(s)  Current Patient Status: Inpatient   Certification Statement: The patient will continue to require additional inpatient hospital stay due to Clinical course  Discharge Plan: Anticipate discharge in 24-48 hrs to home  Code Status: Level 1 - Full Code    Subjective:   Patient seen and was laying in bed, patient stated that he did not get sleep overnight, pain 8/10 overnight concurrently 6/10  Patient continues to have some BM and gas  Patient stated he is followed by GI Dr Sidney Brooke outpatient and will follow up for pain control due to pain medicine slowing down GI track  Patient reported he will call parents with updates      Objective:     Vitals:   Temp (24hrs), Av 7 °F (36 5 °C), Min:97 6 °F (36 4 °C), Max:97 8 °F (36 6 °C)    Temp:  [97 6 °F (36 4 °C)-97 8 °F (36 6 °C)] 97 6 °F (36 4 °C)  HR:  [] 64  Resp:  [18-20] 20  BP: (122-140)/(74-92) 131/78  SpO2:  [91 %-98 %] 94 %  Body mass index is 26 88 kg/m²  Input and Output Summary (last 24 hours): Intake/Output Summary (Last 24 hours) at 10/3/2022 1603  Last data filed at 10/3/2022 0814  Gross per 24 hour   Intake 100 ml   Output 2800 ml   Net -2700 ml       Physical Exam:   Physical Exam  Vitals and nursing note reviewed  Constitutional:       Appearance: He is well-developed  HENT:      Head: Normocephalic and atraumatic  Eyes:      Conjunctiva/sclera: Conjunctivae normal    Cardiovascular:      Rate and Rhythm: Normal rate and regular rhythm  Heart sounds: No murmur heard  Pulmonary:      Effort: Pulmonary effort is normal  No respiratory distress  Breath sounds: Normal breath sounds  No wheezing or rhonchi  Abdominal:      General: There is no distension  Palpations: Abdomen is soft  There is no mass  Tenderness: There is no abdominal tenderness  There is no guarding  Hernia: No hernia is present  Musculoskeletal:      Cervical back: Neck supple  Right lower leg: No edema  Left lower leg: No edema  Skin:     General: Skin is warm and dry  Neurological:      Mental Status: He is alert and oriented to person, place, and time  Motor: No weakness  Gait: Gait normal    Psychiatric:         Mood and Affect: Mood normal          Behavior: Behavior normal          Thought Content:  Thought content normal          Judgment: Judgment normal           Additional Data:     Labs:  Results from last 7 days   Lab Units 10/03/22  0550   WBC Thousand/uL 11 96*   HEMOGLOBIN g/dL 10 0*   HEMATOCRIT % 33 1*   PLATELETS Thousands/uL 409*   NEUTROS PCT % 86*   LYMPHS PCT % 7*   MONOS PCT % 6   EOS PCT % 0     Results from last 7 days   Lab Units 10/03/22  0550   SODIUM mmol/L 138   POTASSIUM mmol/L 3 8   CHLORIDE mmol/L 104 CO2 mmol/L 28   BUN mg/dL 10   CREATININE mg/dL 0 84   ANION GAP mmol/L 6   CALCIUM mg/dL 8 2*   ALBUMIN g/dL 2 9*   TOTAL BILIRUBIN mg/dL 0 69   ALK PHOS U/L 48   ALT U/L 18   AST U/L 8   GLUCOSE RANDOM mg/dL 125     Results from last 7 days   Lab Units 09/30/22  0957   INR  1 03             Results from last 7 days   Lab Units 10/02/22  0620 10/01/22  0838 10/01/22  0536 09/30/22  1157   LACTIC ACID mmol/L 1 1 2 7* 2 7* 1 8   PROCALCITONIN ng/ml  --   --  <0 05  --        Lines/Drains:  Invasive Devices  Report    Peripheral Intravenous Line  Duration           Peripheral IV 10/02/22 Right;Ventral (anterior) Forearm <1 day                      Imaging: Reviewed radiology reports from this admission including: abdominal/pelvic CT and xray(s)    Recent Cultures (last 7 days):   Results from last 7 days   Lab Units 09/30/22  1529   BLOOD CULTURE  No Growth at 48 hrs  No Growth at 48 hrs         Last 24 Hours Medication List:   Current Facility-Administered Medications   Medication Dose Route Frequency Provider Last Rate   • acetaminophen  650 mg Oral Q6H PRN KY Reyez     • calcium carbonate  500 mg Oral Daily PRN KY Reyez     • DULoxetine  60 mg Oral Daily Benji Reyna MD     • enoxaparin  30 mg Subcutaneous Daily Benji eRyna MD     • HYDROmorphone  0 5 mg Intravenous Q3H PRN KY Reyez     • methylPREDNISolone sodium succinate  20 mg Intravenous [facility] Benji Reyna MD     • metroNIDAZOLE  500 mg Intravenous Q8H Benji Reyna  mg (10/03/22 1342)   • ondansetron  4 mg Intravenous Q8H PRN Benji Reyna MD     • ondansetron  4 mg Oral Q6H PRN Benji Reyna MD     • oxyCODONE-acetaminophen  1 tablet Oral Q6H PRN KY Reyez     • pantoprazole  40 mg Intravenous Q12H [facility] [place] Reyez     • saccharomyces boulardii  250 mg Oral BID Benji Reyna MD     • sodium chloride  125 mL/hr Intravenous Continuous Benji Reyna  mL/hr (10/03/22 1456) Today, Patient Was Seen By: Pravin Wade    **Please Note: This note may have been constructed using a voice recognition system  **

## 2022-10-03 NOTE — PROGRESS NOTES
Progress Note - Geneva Esparza 32 y o  male MRN: [id number]    Unit/Bed#: [address] [address] Encounter: [id number]        Assessment/Plan-  IBD with small bowel obstruction: patient previously dx with UC however due to active disease in the small bowel concern for misdiagnosis with actually Crohn's disease, CT scan 9/29 with dilated neorectum with mural thickening and distal ileal thickening, pre-anastomotic bowel loop also dilated  , xray on 10/2 showing slight improvement in distention, having bowel movements and passing gas, no vomiting, less distention  -continue IV steroids, will need taper upon d/c  -recommend close outpatient follow up to discuss starting biologics  -due to improvement, can follow up with surgeon outpatient  -advanced to clears, and is tolerating  -consider repeating flex sig as prior flex-sig in August of this year had just shown mild inflammation of the proximal pouch with several small focal ulcers  -now noted on most recent CTs to have distal ileal thickening and patient was previously taking biologic therapy for ankylosing spondylitis which was discontinued earlier this year and then he has had frequent admissions since then      Subjective:   Patient is lying in bed  He states that he is feeling better but still having abdominal pain but has been moving his bowels and passing gas today  Denies any significant nausea vomiting and feels less distended  Tolerating clears  Patient spoke with surgeon in [place] and they were inquiring about if repeat flex sig was done      Objective:     Vitals: /77   Pulse 79   Temp 97 7 °F (36 5 °C)   Resp 18   Ht 5' 9" (1 753 m)   Wt 82 6 kg (182 lb)   SpO2 95%   BMI 26 88 kg/m²       Physical Exam:  Gen-alert no acute distress  Abd-positive bowel sounds, nondistended, soft, no rebound rigidity or guarding       Lab, Imaging and other studies:   Recent Results (from the past 72 hour(s))   CBC and differential    Collection Time: 09/30/22  9:57 AM   Result Value Ref Range    WBC 20 79 (H) 4 31 - 10 16 Thousand/uL    RBC 6 53 (H) 3 88 - 5 62 Million/uL    Hemoglobin 12 2 12 0 - 17 0 g/dL    Hematocrit 41 5 36 5 - 49 3 %    MCV 64 (L) 82 - 98 fL    MCH 18 7 (L) 26 8 - 34 3 pg    MCHC 29 4 (L) 31 4 - 37 4 g/dL    RDW 18 5 (H) 11 6 - 15 1 %    MPV 8 8 (L) 8 9 - 12 7 fL    Platelets 593 (H) 426 - 390 Thousands/uL    nRBC 0 /100 WBCs    Neutrophils Relative 77 (H) 43 - 75 %    Immat GRANS % 1 0 - 2 %    Lymphocytes Relative 15 14 - 44 %    Monocytes Relative 7 4 - 12 %    Eosinophils Relative 0 0 - 6 %    Basophils Relative 0 0 - 1 %    Neutrophils Absolute 16 02 (H) 1 85 - 7 62 Thousands/µL    Immature Grans Absolute 0 13 0 00 - 0 20 Thousand/uL    Lymphocytes Absolute 3 01 0 60 - 4 47 Thousands/µL    Monocytes Absolute 1 53 (H) 0 17 - 1 22 Thousand/µL    Eosinophils Absolute 0 06 0 00 - 0 61 Thousand/µL    Basophils Absolute 0 04 0 00 - 0 10 Thousands/µL   Protime-INR    Collection Time: 09/30/22  9:57 AM   Result Value Ref Range    Protime 13 7 11 6 - 14 5 seconds    INR 1 03 0 84 - 1 19   APTT    Collection Time: 09/30/22  9:57 AM   Result Value Ref Range    PTT 26 23 - 37 seconds   Comprehensive metabolic panel    Collection Time: 09/30/22  9:57 AM   Result Value Ref Range    Sodium 145 135 - 147 mmol/L    Potassium 3 2 (L) 3 5 - 5 3 mmol/L    Chloride 104 96 - 108 mmol/L    CO2 33 (H) 21 - 32 mmol/L    ANION GAP 8 4 - 13 mmol/L    BUN 14 5 - 25 mg/dL    Creatinine 1 27 0 60 - 1 30 mg/dL    Glucose 114 65 - 140 mg/dL    Calcium 9 1 8 3 - 10 1 mg/dL    AST 10 5 - 45 U/L    ALT 30 12 - 78 U/L    Alkaline Phosphatase 73 46 - 116 U/L    Total Protein 7 4 6 4 - 8 4 g/dL    Albumin 3 8 3 5 - 5 0 g/dL    Total Bilirubin 0 59 0 20 - 1 00 mg/dL    eGFR 75 ml/min/1 73sq m   Lipase    Collection Time: 09/30/22  9:57 AM   Result Value Ref Range    Lipase 160 73 - 393 u/L   Lactic acid    Collection Time: 09/30/22  9:57 AM   Result Value Ref Range    LACTIC ACID 2 1 (HH) 0 5 - 2 0 mmol/L   Lactic acid 2 Hours    Collection Time: 09/30/22 11:57 AM   Result Value Ref Range    LACTIC ACID 1 8 0 5 - 2 0 mmol/L   UA w Reflex to Microscopic w Reflex to Culture    Collection Time: 09/30/22  3:00 PM    Specimen: Urine, Clean Catch   Result Value Ref Range    Color, UA Yellow     Clarity, UA Clear     Specific Gravity, UA >=1 030 1 000 - 1 030    pH, UA 6 0 5 0, 5 5, 6 0, 6 5, 7 0, 7 5, 8 0, 8 5, 9 0    Leukocytes, UA Negative Negative    Nitrite, UA Negative Negative    Protein, UA Negative Negative mg/dl    Glucose, UA Negative Negative mg/dl    Ketones, UA Negative Negative mg/dl    Urobilinogen, UA 0 2 0 2, 1 0 E U /dl E U /dl    Bilirubin, UA Negative Negative    Occult Blood, UA Trace-lysed (A) Negative   Urine Microscopic    Collection Time: 09/30/22  3:00 PM   Result Value Ref Range    RBC, UA 0-1 None Seen, 0-1, 1-2, 2-4, 0-5 /hpf    WBC, UA 0-1 None Seen, 0-1, 1-2, 0-5, 2-4 /hpf    Epithelial Cells None Seen None Seen, Occasional /hpf    Bacteria, UA Moderate (A) None Seen, Occasional /hpf    MUCUS THREADS Occasional (A) None Seen   Rapid drug screen, urine    Collection Time: 09/30/22  3:00 PM   Result Value Ref Range    Amph/Meth UR Negative Negative    Barbiturate Ur Negative Negative    Benzodiazepine Urine Negative Negative    Cocaine Urine Negative Negative    Methadone Urine Negative Negative    Opiate Urine Positive (A) Negative    PCP Ur Negative Negative    THC Urine Negative Negative    Oxycodone Urine Negative Negative   Blood culture    Collection Time: 09/30/22  3:29 PM    Specimen: Arm, Left; Blood   Result Value Ref Range    Blood Culture No Growth at 48 hrs  Blood culture    Collection Time: 09/30/22  3:29 PM    Specimen: Arm, Right; Blood   Result Value Ref Range    Blood Culture No Growth at 48 hrs      Lactic Acid with Reflex STAT    Collection Time: 10/01/22  5:36 AM   Result Value Ref Range    LACTIC ACID 2 7 (HH) 0 5 - 2 0 mmol/L   Procalcitonin Collection Time: 10/01/22  5:36 AM   Result Value Ref Range    Procalcitonin <0 05 <=0 25 ng/ml   Comprehensive metabolic panel    Collection Time: 10/01/22  5:36 AM   Result Value Ref Range    Sodium 136 135 - 147 mmol/L    Potassium 4 2 3 5 - 5 3 mmol/L    Chloride 102 96 - 108 mmol/L    CO2 27 21 - 32 mmol/L    ANION GAP 7 4 - 13 mmol/L    BUN 13 5 - 25 mg/dL    Creatinine 0 94 0 60 - 1 30 mg/dL    Glucose 123 65 - 140 mg/dL    Calcium 8 4 8 3 - 10 1 mg/dL    Corrected Calcium 9 1 8 3 - 10 1 mg/dL    AST <5 (L) 5 - 45 U/L    ALT 24 12 - 78 U/L    Alkaline Phosphatase 56 46 - 116 U/L    Total Protein 6 3 (L) 6 4 - 8 4 g/dL    Albumin 3 1 (L) 3 5 - 5 0 g/dL    Total Bilirubin 0 71 0 20 - 1 00 mg/dL    eGFR 109 ml/min/1 73sq m   CBC and differential    Collection Time: 10/01/22  5:36 AM   Result Value Ref Range    WBC 11 74 (H) 4 31 - 10 16 Thousand/uL    RBC 5 30 3 88 - 5 62 Million/uL    Hemoglobin 10 0 (L) 12 0 - 17 0 g/dL    Hematocrit 33 6 (L) 36 5 - 49 3 %    MCV 63 (L) 82 - 98 fL    MCH 18 9 (L) 26 8 - 34 3 pg    MCHC 29 8 (L) 31 4 - 37 4 g/dL    RDW 16 9 (H) 11 6 - 15 1 %    MPV 8 8 (L) 8 9 - 12 7 fL    Platelets 215 (H) 188 - 390 Thousands/uL    nRBC 0 /100 WBCs    Neutrophils Relative 89 (H) 43 - 75 %    Immat GRANS % 1 0 - 2 %    Lymphocytes Relative 7 (L) 14 - 44 %    Monocytes Relative 3 (L) 4 - 12 %    Eosinophils Relative 0 0 - 6 %    Basophils Relative 0 0 - 1 %    Neutrophils Absolute 10 59 (H) 1 85 - 7 62 Thousands/µL    Immature Grans Absolute 0 07 0 00 - 0 20 Thousand/uL    Lymphocytes Absolute 0 76 0 60 - 4 47 Thousands/µL    Monocytes Absolute 0 31 0 17 - 1 22 Thousand/µL    Eosinophils Absolute 0 00 0 00 - 0 61 Thousand/µL    Basophils Absolute 0 01 0 00 - 0 10 Thousands/µL   Lactic acid 2 Hours    Collection Time: 10/01/22  8:38 AM   Result Value Ref Range    LACTIC ACID 2 7 (HH) 0 5 - 2 0 mmol/L   Lactic acid, plasma    Collection Time: 10/02/22  6:20 AM   Result Value Ref Range    LACTIC ACID 1 1 0 5 - 2 0 mmol/L   Comprehensive metabolic panel    Collection Time: 10/02/22  6:20 AM   Result Value Ref Range    Sodium 137 135 - 147 mmol/L    Potassium 4 2 3 5 - 5 3 mmol/L    Chloride 104 96 - 108 mmol/L    CO2 28 21 - 32 mmol/L    ANION GAP 5 4 - 13 mmol/L    BUN 11 5 - 25 mg/dL    Creatinine 0 82 0 60 - 1 30 mg/dL    Glucose 124 65 - 140 mg/dL    Calcium 8 4 8 3 - 10 1 mg/dL    Corrected Calcium 9 1 8 3 - 10 1 mg/dL    AST 7 5 - 45 U/L    ALT 19 12 - 78 U/L    Alkaline Phosphatase 50 46 - 116 U/L    Total Protein 6 2 (L) 6 4 - 8 4 g/dL    Albumin 3 1 (L) 3 5 - 5 0 g/dL    Total Bilirubin 0 69 0 20 - 1 00 mg/dL    eGFR 119 ml/min/1 73sq m   CBC and differential    Collection Time: 10/02/22  6:20 AM   Result Value Ref Range    WBC 10 00 4 31 - 10 16 Thousand/uL    RBC 5 30 3 88 - 5 62 Million/uL    Hemoglobin 10 2 (L) 12 0 - 17 0 g/dL    Hematocrit 33 4 (L) 36 5 - 49 3 %    MCV 63 (L) 82 - 98 fL    MCH 19 2 (L) 26 8 - 34 3 pg    MCHC 30 5 (L) 31 4 - 37 4 g/dL    RDW 16 7 (H) 11 6 - 15 1 %    MPV 8 7 (L) 8 9 - 12 7 fL    Platelets 738 (H) 375 - 390 Thousands/uL    nRBC 0 /100 WBCs    Neutrophils Relative 87 (H) 43 - 75 %    Immat GRANS % 0 0 - 2 %    Lymphocytes Relative 8 (L) 14 - 44 %    Monocytes Relative 5 4 - 12 %    Eosinophils Relative 0 0 - 6 %    Basophils Relative 0 0 - 1 %    Neutrophils Absolute 8 61 (H) 1 85 - 7 62 Thousands/µL    Immature Grans Absolute 0 03 0 00 - 0 20 Thousand/uL    Lymphocytes Absolute 0 83 0 60 - 4 47 Thousands/µL    Monocytes Absolute 0 52 0 17 - 1 22 Thousand/µL    Eosinophils Absolute 0 00 0 00 - 0 61 Thousand/µL    Basophils Absolute 0 01 0 00 - 0 10 Thousands/µL   Comprehensive metabolic panel    Collection Time: 10/03/22  5:50 AM   Result Value Ref Range    Sodium 138 135 - 147 mmol/L    Potassium 3 8 3 5 - 5 3 mmol/L    Chloride 104 96 - 108 mmol/L    CO2 28 21 - 32 mmol/L    ANION GAP 6 4 - 13 mmol/L    BUN 10 5 - 25 mg/dL    Creatinine 0 84 0 60 - 1 30 mg/dL Glucose 125 65 - 140 mg/dL    Calcium 8 2 (L) 8 3 - 10 1 mg/dL    Corrected Calcium 9 1 8 3 - 10 1 mg/dL    AST 8 5 - 45 U/L    ALT 18 12 - 78 U/L    Alkaline Phosphatase 48 46 - 116 U/L    Total Protein 5 9 (L) 6 4 - 8 4 g/dL    Albumin 2 9 (L) 3 5 - 5 0 g/dL    Total Bilirubin 0 69 0 20 - 1 00 mg/dL    eGFR 118 ml/min/1 73sq m   CBC and differential    Collection Time: 10/03/22  5:50 AM   Result Value Ref Range    WBC 11 96 (H) 4 31 - 10 16 Thousand/uL    RBC 5 25 3 88 - 5 62 Million/uL    Hemoglobin 10 0 (L) 12 0 - 17 0 g/dL    Hematocrit 33 1 (L) 36 5 - 49 3 %    MCV 63 (L) 82 - 98 fL    MCH 19 0 (L) 26 8 - 34 3 pg    MCHC 30 2 (L) 31 4 - 37 4 g/dL    RDW 16 4 (H) 11 6 - 15 1 %    MPV 9 1 8 9 - 12 7 fL    Platelets 846 (H) 247 - 390 Thousands/uL    nRBC 0 /100 WBCs    Neutrophils Relative 86 (H) 43 - 75 %    Immat GRANS % 1 0 - 2 %    Lymphocytes Relative 7 (L) 14 - 44 %    Monocytes Relative 6 4 - 12 %    Eosinophils Relative 0 0 - 6 %    Basophils Relative 0 0 - 1 %    Neutrophils Absolute 10 25 (H) 1 85 - 7 62 Thousands/µL    Immature Grans Absolute 0 06 0 00 - 0 20 Thousand/uL    Lymphocytes Absolute 0 89 0 60 - 4 47 Thousands/µL    Monocytes Absolute 0 75 0 17 - 1 22 Thousand/µL    Eosinophils Absolute 0 00 0 00 - 0 61 Thousand/µL    Basophils Absolute 0 01 0 00 - 0 10 Thousands/µL

## 2022-10-04 ENCOUNTER — ANESTHESIA EVENT (INPATIENT)
Dept: PERIOP | Facility: HOSPITAL | Age: 27
DRG: 872 | End: 2022-10-04
Payer: COMMERCIAL

## 2022-10-04 ENCOUNTER — PATIENT OUTREACH (OUTPATIENT)
Dept: FAMILY MEDICINE CLINIC | Facility: CLINIC | Age: 27
End: 2022-10-04

## 2022-10-04 ENCOUNTER — ANESTHESIA (INPATIENT)
Dept: PERIOP | Facility: HOSPITAL | Age: 27
DRG: 872 | End: 2022-10-04
Payer: COMMERCIAL

## 2022-10-04 ENCOUNTER — APPOINTMENT (OUTPATIENT)
Dept: PERIOP | Facility: HOSPITAL | Age: 27
DRG: 872 | End: 2022-10-04
Payer: COMMERCIAL

## 2022-10-04 DIAGNOSIS — Z71.89 COMPLEX CARE COORDINATION: Primary | ICD-10-CM

## 2022-10-04 PROBLEM — R10.9 ABDOMINAL PAIN: Status: ACTIVE | Noted: 2018-09-13

## 2022-10-04 LAB
ALBUMIN SERPL BCP-MCNC: 2.8 G/DL (ref 3.5–5)
ALP SERPL-CCNC: 48 U/L (ref 46–116)
ALT SERPL W P-5'-P-CCNC: 17 U/L (ref 12–78)
ANION GAP SERPL CALCULATED.3IONS-SCNC: 6 MMOL/L (ref 4–13)
AST SERPL W P-5'-P-CCNC: 6 U/L (ref 5–45)
BASOPHILS # BLD AUTO: 0.01 THOUSANDS/ΜL (ref 0–0.1)
BASOPHILS NFR BLD AUTO: 0 % (ref 0–1)
BILIRUB SERPL-MCNC: 0.69 MG/DL (ref 0.2–1)
BUN SERPL-MCNC: 10 MG/DL (ref 5–25)
CALCIUM ALBUM COR SERPL-MCNC: 9.2 MG/DL (ref 8.3–10.1)
CALCIUM SERPL-MCNC: 8.2 MG/DL (ref 8.3–10.1)
CHLORIDE SERPL-SCNC: 102 MMOL/L (ref 96–108)
CO2 SERPL-SCNC: 28 MMOL/L (ref 21–32)
CREAT SERPL-MCNC: 0.81 MG/DL (ref 0.6–1.3)
EOSINOPHIL # BLD AUTO: 0 THOUSAND/ΜL (ref 0–0.61)
EOSINOPHIL NFR BLD AUTO: 0 % (ref 0–6)
ERYTHROCYTE [DISTWIDTH] IN BLOOD BY AUTOMATED COUNT: 15.9 % (ref 11.6–15.1)
GFR SERPL CREATININE-BSD FRML MDRD: 120 ML/MIN/1.73SQ M
GLUCOSE SERPL-MCNC: 111 MG/DL (ref 65–140)
HCT VFR BLD AUTO: 33.6 % (ref 36.5–49.3)
HGB BLD-MCNC: 10.2 G/DL (ref 12–17)
IMM GRANULOCYTES # BLD AUTO: 0.1 THOUSAND/UL (ref 0–0.2)
IMM GRANULOCYTES NFR BLD AUTO: 1 % (ref 0–2)
LYMPHOCYTES # BLD AUTO: 1.31 THOUSANDS/ΜL (ref 0.6–4.47)
LYMPHOCYTES NFR BLD AUTO: 9 % (ref 14–44)
MCH RBC QN AUTO: 19.1 PG (ref 26.8–34.3)
MCHC RBC AUTO-ENTMCNC: 30.4 G/DL (ref 31.4–37.4)
MCV RBC AUTO: 63 FL (ref 82–98)
MONOCYTES # BLD AUTO: 1.01 THOUSAND/ΜL (ref 0.17–1.22)
MONOCYTES NFR BLD AUTO: 7 % (ref 4–12)
NEUTROPHILS # BLD AUTO: 12.96 THOUSANDS/ΜL (ref 1.85–7.62)
NEUTS SEG NFR BLD AUTO: 83 % (ref 43–75)
NRBC BLD AUTO-RTO: 0 /100 WBCS
PLATELET # BLD AUTO: 405 THOUSANDS/UL (ref 149–390)
PMV BLD AUTO: 9 FL (ref 8.9–12.7)
POTASSIUM SERPL-SCNC: 3.8 MMOL/L (ref 3.5–5.3)
PROT SERPL-MCNC: 5.8 G/DL (ref 6.4–8.4)
RBC # BLD AUTO: 5.35 MILLION/UL (ref 3.88–5.62)
SODIUM SERPL-SCNC: 136 MMOL/L (ref 135–147)
WBC # BLD AUTO: 15.39 THOUSAND/UL (ref 4.31–10.16)

## 2022-10-04 PROCEDURE — C9113 INJ PANTOPRAZOLE SODIUM, VIA: HCPCS | Performed by: INTERNAL MEDICINE

## 2022-10-04 PROCEDURE — 85025 COMPLETE CBC W/AUTO DIFF WBC: CPT | Performed by: STUDENT IN AN ORGANIZED HEALTH CARE EDUCATION/TRAINING PROGRAM

## 2022-10-04 PROCEDURE — 80053 COMPREHEN METABOLIC PANEL: CPT | Performed by: STUDENT IN AN ORGANIZED HEALTH CARE EDUCATION/TRAINING PROGRAM

## 2022-10-04 PROCEDURE — C9113 INJ PANTOPRAZOLE SODIUM, VIA: HCPCS | Performed by: NURSE PRACTITIONER

## 2022-10-04 PROCEDURE — 44385 ENDOSCOPY OF BOWEL POUCH: CPT | Performed by: INTERNAL MEDICINE

## 2022-10-04 PROCEDURE — 99232 SBSQ HOSP IP/OBS MODERATE 35: CPT | Performed by: FAMILY MEDICINE

## 2022-10-04 PROCEDURE — 0DJDXZZ INSPECTION OF LOWER INTESTINAL TRACT, EXTERNAL APPROACH: ICD-10-PCS | Performed by: INTERNAL MEDICINE

## 2022-10-04 PROCEDURE — 0DJD8ZZ INSPECTION OF LOWER INTESTINAL TRACT, VIA NATURAL OR ARTIFICIAL OPENING ENDOSCOPIC: ICD-10-PCS | Performed by: INTERNAL MEDICINE

## 2022-10-04 RX ORDER — LIDOCAINE HYDROCHLORIDE 10 MG/ML
INJECTION, SOLUTION EPIDURAL; INFILTRATION; INTRACAUDAL; PERINEURAL AS NEEDED
Status: DISCONTINUED | OUTPATIENT
Start: 2022-10-04 | End: 2022-10-04

## 2022-10-04 RX ORDER — ONDANSETRON 2 MG/ML
4 INJECTION INTRAMUSCULAR; INTRAVENOUS ONCE AS NEEDED
Status: CANCELLED | OUTPATIENT
Start: 2022-10-04

## 2022-10-04 RX ORDER — SODIUM CHLORIDE, SODIUM LACTATE, POTASSIUM CHLORIDE, CALCIUM CHLORIDE 600; 310; 30; 20 MG/100ML; MG/100ML; MG/100ML; MG/100ML
INJECTION, SOLUTION INTRAVENOUS CONTINUOUS PRN
Status: DISCONTINUED | OUTPATIENT
Start: 2022-10-04 | End: 2022-10-04

## 2022-10-04 RX ORDER — PROPOFOL 10 MG/ML
INJECTION, EMULSION INTRAVENOUS AS NEEDED
Status: DISCONTINUED | OUTPATIENT
Start: 2022-10-04 | End: 2022-10-04

## 2022-10-04 RX ADMIN — HYDROMORPHONE HYDROCHLORIDE 0.5 MG: 1 INJECTION, SOLUTION INTRAMUSCULAR; INTRAVENOUS; SUBCUTANEOUS at 11:16

## 2022-10-04 RX ADMIN — HYDROMORPHONE HYDROCHLORIDE 0.5 MG: 1 INJECTION, SOLUTION INTRAMUSCULAR; INTRAVENOUS; SUBCUTANEOUS at 22:29

## 2022-10-04 RX ADMIN — DULOXETINE HYDROCHLORIDE 60 MG: 60 CAPSULE, DELAYED RELEASE ORAL at 08:58

## 2022-10-04 RX ADMIN — SODIUM CHLORIDE, SODIUM LACTATE, POTASSIUM CHLORIDE, AND CALCIUM CHLORIDE: .6; .31; .03; .02 INJECTION, SOLUTION INTRAVENOUS at 14:58

## 2022-10-04 RX ADMIN — METHYLPREDNISOLONE SODIUM SUCCINATE 20 MG: 40 INJECTION, POWDER, FOR SOLUTION INTRAMUSCULAR; INTRAVENOUS at 13:09

## 2022-10-04 RX ADMIN — ONDANSETRON 4 MG: 2 INJECTION INTRAMUSCULAR; INTRAVENOUS at 19:26

## 2022-10-04 RX ADMIN — PANTOPRAZOLE SODIUM 40 MG: 40 INJECTION, POWDER, FOR SOLUTION INTRAVENOUS at 21:16

## 2022-10-04 RX ADMIN — METRONIDAZOLE 500 MG: 500 INJECTION, SOLUTION INTRAVENOUS at 13:10

## 2022-10-04 RX ADMIN — Medication 250 MG: at 17:32

## 2022-10-04 RX ADMIN — METRONIDAZOLE 500 MG: 500 INJECTION, SOLUTION INTRAVENOUS at 21:16

## 2022-10-04 RX ADMIN — HYDROMORPHONE HYDROCHLORIDE 0.5 MG: 1 INJECTION, SOLUTION INTRAMUSCULAR; INTRAVENOUS; SUBCUTANEOUS at 00:41

## 2022-10-04 RX ADMIN — PROPOFOL 50 MG: 10 INJECTION, EMULSION INTRAVENOUS at 15:12

## 2022-10-04 RX ADMIN — HYDROMORPHONE HYDROCHLORIDE 0.5 MG: 1 INJECTION, SOLUTION INTRAMUSCULAR; INTRAVENOUS; SUBCUTANEOUS at 19:26

## 2022-10-04 RX ADMIN — HYDROMORPHONE HYDROCHLORIDE 0.5 MG: 1 INJECTION, SOLUTION INTRAMUSCULAR; INTRAVENOUS; SUBCUTANEOUS at 07:39

## 2022-10-04 RX ADMIN — LIDOCAINE HYDROCHLORIDE 50 MG: 10 INJECTION, SOLUTION EPIDURAL; INFILTRATION; INTRACAUDAL; PERINEURAL at 15:07

## 2022-10-04 RX ADMIN — PANTOPRAZOLE SODIUM 40 MG: 40 INJECTION, POWDER, FOR SOLUTION INTRAVENOUS at 08:58

## 2022-10-04 RX ADMIN — PROPOFOL 50 MG: 10 INJECTION, EMULSION INTRAVENOUS at 15:16

## 2022-10-04 RX ADMIN — HYDROMORPHONE HYDROCHLORIDE 0.5 MG: 1 INJECTION, SOLUTION INTRAMUSCULAR; INTRAVENOUS; SUBCUTANEOUS at 15:57

## 2022-10-04 RX ADMIN — SODIUM CHLORIDE 125 ML/HR: 0.9 INJECTION, SOLUTION INTRAVENOUS at 16:51

## 2022-10-04 RX ADMIN — METRONIDAZOLE 500 MG: 500 INJECTION, SOLUTION INTRAVENOUS at 05:19

## 2022-10-04 RX ADMIN — METHYLPREDNISOLONE SODIUM SUCCINATE 20 MG: 40 INJECTION, POWDER, FOR SOLUTION INTRAMUSCULAR; INTRAVENOUS at 05:20

## 2022-10-04 RX ADMIN — SODIUM CHLORIDE 125 ML/HR: 0.9 INJECTION, SOLUTION INTRAVENOUS at 05:24

## 2022-10-04 RX ADMIN — PROPOFOL 100 MG: 10 INJECTION, EMULSION INTRAVENOUS at 15:07

## 2022-10-04 RX ADMIN — METHYLPREDNISOLONE SODIUM SUCCINATE 20 MG: 40 INJECTION, POWDER, FOR SOLUTION INTRAMUSCULAR; INTRAVENOUS at 21:16

## 2022-10-04 RX ADMIN — Medication 250 MG: at 08:58

## 2022-10-04 RX ADMIN — HYDROMORPHONE HYDROCHLORIDE 0.5 MG: 1 INJECTION, SOLUTION INTRAMUSCULAR; INTRAVENOUS; SUBCUTANEOUS at 04:25

## 2022-10-04 NOTE — PROGRESS NOTES
[employer]/referral for CCM    Chart was reviewed and patient remains an IP admission for [facility] with Ulcerative Colitis  OPCM RN to follow at dc

## 2022-10-04 NOTE — PROGRESS NOTES
Patient is a 30 day readmission to [facility] [place]  Patient re-referred for complex care management as a [employer]  He has been reviewed by the [facility] committee, most recently 9/22/22, & a care plan is not appropriate at this time

## 2022-10-04 NOTE — PROGRESS NOTES
Julian 43  Progress Note - Ben  1995, 32 y o  male MRN: [id number]  Unit/Bed#: [address] [place] [address] Encounter: [id number]  Primary Care Provider: Jeanna Parikh DO   Date and time admitted to hospital: 9/30/2022  9:31 AM    * Abdominal pain  Assessment & Plan  Patient presented to the ER with abdominal pain   · Continue 20 mg IV Solu-Medrol q 8 hours  · CT ab: Post colectomy  The neorectum is dilated with mural thickening  Distal ileum with wall thickening  There is a second bowel anastomosis slightly proximal to the spine  Dilated Preanastomotic bowel loop  · Recent obstruction series with decrease in bowel distension  · Continue IV Flagyl  · GI recommending starting biologics as outpatient as he was previously on biologic therapy for ankylosing spondylitis which was discontinued earlier this year  Since then patient has been admitted multiple times with abdominal pain        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[facility] [address] )  Assessment & Plan  Status post bowel chest PE today which was negative for stricture or inflammation  Diet has been advanced to solids  Antibiotics and steroids as listed    SBO (small bowel obstruction) (HCC)  Assessment & Plan  Stable  · Repeated bowel movement, nonbloody and positive for gas  · Abdominal x-ray as noted above  · Patient refused NG tube for decompression    Sepsis without acute organ dysfunction (HCC)-resolved as of 10/3/2022  Assessment & Plan  Improving,  Meets criteria on admission with elevated WBCs (possibly secondary to steroid home consumption), tachycardic  WBC trending down  Continue IV metronidazole  GI consulted, appreciate recs  Pancultures- NGTD 48 h, urine cultures- pending  Will monitor      VTE Pharmacologic Prophylaxis:   Lovenox    Patient Centered Rounds: I performed bedside rounds with nursing staff today    Discussions with Specialists or Other Care Team Provider: yes     Education and Discussions with Family / Patient: yes    Time Spent for Care: 30 minutes  More than 50% of total time spent on counseling and coordination of care as described above  Current Length of Stay: 4 day(s)  Current Patient Status: Inpatient   Certification Statement: The patient will continue to require additional inpatient hospital stay due to Abdominal pain requiring pouchoscopy and advancement of diet  Discharge Plan: Anticipate discharge tomorrow to home  Code Status: Level 1 - Full Code    Subjective:     Patient reports improving abdominal pain  Last BM was yesterday    Objective:     Vitals:   Temp (24hrs), Av 7 °F (36 5 °C), Min:97 5 °F (36 4 °C), Max:97 9 °F (36 6 °C)    Temp:  [97 5 °F (36 4 °C)-97 9 °F (36 6 °C)] 97 7 °F (36 5 °C)  HR:  [61-73] 61  Resp:  [19-20] 19  BP: (131-141)/(78-89) 141/88  SpO2:  [94 %-96 %] 96 %  Body mass index is 26 88 kg/m²  Input and Output Summary (last 24 hours): Intake/Output Summary (Last 24 hours) at 10/4/2022 1007  Last data filed at 10/4/2022 0751  Gross per 24 hour   Intake --   Output 200 ml   Net -200 ml       Physical Exam:   Physical Exam  Vitals reviewed  Constitutional:       General: He is not in acute distress  Appearance: He is not ill-appearing  HENT:      Head: Normocephalic and atraumatic  Eyes:      General:         Right eye: No discharge  Left eye: No discharge  Extraocular Movements: Extraocular movements intact  Cardiovascular:      Rate and Rhythm: Normal rate and regular rhythm  Pulmonary:      Effort: Pulmonary effort is normal  No respiratory distress  Breath sounds: Normal breath sounds  No wheezing or rales  Abdominal:      General: Bowel sounds are normal  There is no distension  Palpations: Abdomen is soft  Tenderness: There is abdominal tenderness (Lower abdomen)  There is no guarding  Neurological:      Mental Status: He is alert and oriented to person, place, and time           Additional Data:     Labs:  Results from last 7 days   Lab Units 10/04/22  0642   WBC Thousand/uL 15 39*   HEMOGLOBIN g/dL 10 2*   HEMATOCRIT % 33 6*   PLATELETS Thousands/uL 405*   NEUTROS PCT % 83*   LYMPHS PCT % 9*   MONOS PCT % 7   EOS PCT % 0     Results from last 7 days   Lab Units 10/04/22  0642   SODIUM mmol/L 136   POTASSIUM mmol/L 3 8   CHLORIDE mmol/L 102   CO2 mmol/L 28   BUN mg/dL 10   CREATININE mg/dL 0 81   ANION GAP mmol/L 6   CALCIUM mg/dL 8 2*   ALBUMIN g/dL 2 8*   TOTAL BILIRUBIN mg/dL 0 69   ALK PHOS U/L 48   ALT U/L 17   AST U/L 6   GLUCOSE RANDOM mg/dL 111     Results from last 7 days   Lab Units 09/30/22  0957   INR  1 03             Results from last 7 days   Lab Units 10/02/22  0620 10/01/22  0838 10/01/22  0536 09/30/22  1157   LACTIC ACID mmol/L 1 1 2 7* 2 7* 1 8   PROCALCITONIN ng/ml  --   --  <0 05  --        Lines/Drains:  Invasive Devices  Report    Peripheral Intravenous Line  Duration           Peripheral IV 10/02/22 Right;Ventral (anterior) Forearm 1 day              Imaging: Reviewed radiology reports from this admission including: abdominal/pelvic CT and xray(s)    Recent Cultures (last 7 days):   Results from last 7 days   Lab Units 09/30/22  1529   BLOOD CULTURE  No Growth at 72 hrs  No Growth at 72 hrs         Last 24 Hours Medication List:   Current Facility-Administered Medications   Medication Dose Route Frequency Provider Last Rate   • acetaminophen  650 mg Oral Q6H PRN [place] Reyez     • calcium carbonate  500 mg Oral Daily PRN [place] Reyez     • DULoxetine  60 mg Oral Daily Candido Flores MD     • enoxaparin  30 mg Subcutaneous Daily Candido Flores MD     • HYDROmorphone  0 5 mg Intravenous Q3H PRN [place] Reyez     • methylPREDNISolone sodium succinate  20 mg Intravenous [facility] Candido Flores MD     • metroNIDAZOLE  500 mg Intravenous Desmond Daniels  mg (10/04/22 0519)   • ondansetron  4 mg Intravenous Q8H PRN Candido Flores MD     • ondansetron  4 mg Oral Q6H PRN Candido Flores MD     • oxyCODONE-acetaminophen  1 tablet Oral Q6H PRN [place] Reyez     • pantoprazole  40 mg Intravenous Q12H [address] [place] Reyez     • saccharomyces boulardii  250 mg Oral BID Mariola Decker MD     • sodium chloride  125 mL/hr Intravenous Continuous Mariola Decker  mL/hr (10/04/22 0524)        Today, Patient Was Seen By: Briana Ramsey    **Please Note: This note may have been constructed using a voice recognition system  **

## 2022-10-04 NOTE — ANESTHESIA PREPROCEDURE EVALUATION
Procedure:  FLEXIBLE SIGMOIDOSCOPY    Relevant Problems   ANESTHESIA (within normal limits)      CARDIO (within normal limits)      ENDO (within normal limits)      GI/HEPATIC   (+) SBO (small bowel obstruction) (HCC)      HEMATOLOGY   (+) Anemia      MUSCULOSKELETAL   (+) Ankylosing spondylitis (HCC)   (+) Left-sided low back pain without sciatica      NEURO/PSYCH   (+) CAR (generalized anxiety disorder)   (+) History of ulcerative colitis      PULMONARY (within normal limits)  Covid positive        Physical Exam    Airway    Mallampati score: I  TM Distance: >3 FB  Neck ROM: full     Dental   No notable dental hx     Cardiovascular  Rhythm: regular, Rate: normal,     Pulmonary  Breath sounds clear to auscultation,     Other Findings        Anesthesia Plan  ASA Score- 2     Anesthesia Type- IV sedation with anesthesia with ASA Monitors  Additional Monitors:   Airway Plan:           Plan Factors-Exercise tolerance (METS): >4 METS  Chart reviewed  Existing labs reviewed  Patient summary reviewed  Patient is not a current smoker  Induction-     Postoperative Plan-     Informed Consent- Anesthetic plan and risks discussed with patient  I personally reviewed this patient with the CRNA  Discussed and agreed on the Anesthesia Plan with the CRNA  Bibi Woodard

## 2022-10-04 NOTE — INTERVAL H&P NOTE
H&P reviewed  After examining the patient I find no changes in the patients condition since the H&P had been written      Vitals:    10/04/22 1347   BP: 160/94   Pulse: 60   Resp: 18   Temp:    SpO2: 99%

## 2022-10-04 NOTE — ANESTHESIA POSTPROCEDURE EVALUATION
Post-Op Assessment Note    CV Status:  Stable  Pain Score: 0    Pain management: adequate     Mental Status:  Alert   Hydration Status:  Stable and euvolemic   PONV Controlled:  None   Airway Patency:  Patent      Post Op Vitals Reviewed: Yes      Staff: CRNA         No complications documented      BP   139/83   Temp      Pulse 52   Resp 18   SpO2 98

## 2022-10-05 ENCOUNTER — APPOINTMENT (INPATIENT)
Dept: RADIOLOGY | Facility: HOSPITAL | Age: 27
DRG: 872 | End: 2022-10-05
Payer: COMMERCIAL

## 2022-10-05 PROBLEM — K56.609 SBO (SMALL BOWEL OBSTRUCTION) (HCC): Status: RESOLVED | Noted: 2022-07-06 | Resolved: 2022-10-05

## 2022-10-05 LAB
ALBUMIN SERPL BCP-MCNC: 2.8 G/DL (ref 3.5–5)
ALP SERPL-CCNC: 54 U/L (ref 46–116)
ALT SERPL W P-5'-P-CCNC: 16 U/L (ref 12–78)
ANION GAP SERPL CALCULATED.3IONS-SCNC: 7 MMOL/L (ref 4–13)
AST SERPL W P-5'-P-CCNC: 8 U/L (ref 5–45)
BACTERIA BLD CULT: NORMAL
BACTERIA BLD CULT: NORMAL
BASOPHILS # BLD AUTO: 0.01 THOUSANDS/ΜL (ref 0–0.1)
BASOPHILS NFR BLD AUTO: 0 % (ref 0–1)
BILIRUB SERPL-MCNC: 0.46 MG/DL (ref 0.2–1)
BUN SERPL-MCNC: 12 MG/DL (ref 5–25)
CALCIUM ALBUM COR SERPL-MCNC: 9.1 MG/DL (ref 8.3–10.1)
CALCIUM SERPL-MCNC: 8.1 MG/DL (ref 8.3–10.1)
CHLORIDE SERPL-SCNC: 102 MMOL/L (ref 96–108)
CO2 SERPL-SCNC: 28 MMOL/L (ref 21–32)
CREAT SERPL-MCNC: 0.87 MG/DL (ref 0.6–1.3)
EOSINOPHIL # BLD AUTO: 0 THOUSAND/ΜL (ref 0–0.61)
EOSINOPHIL NFR BLD AUTO: 0 % (ref 0–6)
ERYTHROCYTE [DISTWIDTH] IN BLOOD BY AUTOMATED COUNT: 15.9 % (ref 11.6–15.1)
GFR SERPL CREATININE-BSD FRML MDRD: 116 ML/MIN/1.73SQ M
GLUCOSE SERPL-MCNC: 151 MG/DL (ref 65–140)
HCT VFR BLD AUTO: 34.5 % (ref 36.5–49.3)
HGB BLD-MCNC: 10.6 G/DL (ref 12–17)
IMM GRANULOCYTES # BLD AUTO: 0.09 THOUSAND/UL (ref 0–0.2)
IMM GRANULOCYTES NFR BLD AUTO: 1 % (ref 0–2)
LYMPHOCYTES # BLD AUTO: 1.32 THOUSANDS/ΜL (ref 0.6–4.47)
LYMPHOCYTES NFR BLD AUTO: 9 % (ref 14–44)
MCH RBC QN AUTO: 19.4 PG (ref 26.8–34.3)
MCHC RBC AUTO-ENTMCNC: 30.7 G/DL (ref 31.4–37.4)
MCV RBC AUTO: 63 FL (ref 82–98)
MONOCYTES # BLD AUTO: 1.18 THOUSAND/ΜL (ref 0.17–1.22)
MONOCYTES NFR BLD AUTO: 8 % (ref 4–12)
NEUTROPHILS # BLD AUTO: 11.39 THOUSANDS/ΜL (ref 1.85–7.62)
NEUTS SEG NFR BLD AUTO: 82 % (ref 43–75)
NRBC BLD AUTO-RTO: 0 /100 WBCS
PLATELET # BLD AUTO: 447 THOUSANDS/UL (ref 149–390)
PMV BLD AUTO: 9.1 FL (ref 8.9–12.7)
POTASSIUM SERPL-SCNC: 3.6 MMOL/L (ref 3.5–5.3)
PROT SERPL-MCNC: 5.6 G/DL (ref 6.4–8.4)
RBC # BLD AUTO: 5.47 MILLION/UL (ref 3.88–5.62)
SODIUM SERPL-SCNC: 137 MMOL/L (ref 135–147)
WBC # BLD AUTO: 13.99 THOUSAND/UL (ref 4.31–10.16)

## 2022-10-05 PROCEDURE — G1004 CDSM NDSC: HCPCS

## 2022-10-05 PROCEDURE — 85025 COMPLETE CBC W/AUTO DIFF WBC: CPT | Performed by: INTERNAL MEDICINE

## 2022-10-05 PROCEDURE — 80053 COMPREHEN METABOLIC PANEL: CPT | Performed by: INTERNAL MEDICINE

## 2022-10-05 PROCEDURE — C9113 INJ PANTOPRAZOLE SODIUM, VIA: HCPCS | Performed by: INTERNAL MEDICINE

## 2022-10-05 PROCEDURE — 99232 SBSQ HOSP IP/OBS MODERATE 35: CPT | Performed by: FAMILY MEDICINE

## 2022-10-05 PROCEDURE — 99232 SBSQ HOSP IP/OBS MODERATE 35: CPT | Performed by: INTERNAL MEDICINE

## 2022-10-05 PROCEDURE — 74177 CT ABD & PELVIS W/CONTRAST: CPT

## 2022-10-05 RX ADMIN — METRONIDAZOLE 500 MG: 500 INJECTION, SOLUTION INTRAVENOUS at 05:04

## 2022-10-05 RX ADMIN — METRONIDAZOLE 500 MG: 500 INJECTION, SOLUTION INTRAVENOUS at 22:26

## 2022-10-05 RX ADMIN — SODIUM CHLORIDE 125 ML/HR: 0.9 INJECTION, SOLUTION INTRAVENOUS at 04:11

## 2022-10-05 RX ADMIN — HYDROMORPHONE HYDROCHLORIDE 0.5 MG: 1 INJECTION, SOLUTION INTRAMUSCULAR; INTRAVENOUS; SUBCUTANEOUS at 08:24

## 2022-10-05 RX ADMIN — PANTOPRAZOLE SODIUM 40 MG: 40 INJECTION, POWDER, FOR SOLUTION INTRAVENOUS at 08:24

## 2022-10-05 RX ADMIN — PANTOPRAZOLE SODIUM 40 MG: 40 INJECTION, POWDER, FOR SOLUTION INTRAVENOUS at 22:24

## 2022-10-05 RX ADMIN — HYDROMORPHONE HYDROCHLORIDE 0.5 MG: 1 INJECTION, SOLUTION INTRAMUSCULAR; INTRAVENOUS; SUBCUTANEOUS at 12:05

## 2022-10-05 RX ADMIN — ONDANSETRON 4 MG: 2 INJECTION INTRAMUSCULAR; INTRAVENOUS at 22:39

## 2022-10-05 RX ADMIN — METHYLPREDNISOLONE SODIUM SUCCINATE 20 MG: 40 INJECTION, POWDER, FOR SOLUTION INTRAMUSCULAR; INTRAVENOUS at 14:12

## 2022-10-05 RX ADMIN — Medication 250 MG: at 18:20

## 2022-10-05 RX ADMIN — HYDROMORPHONE HYDROCHLORIDE 0.5 MG: 1 INJECTION, SOLUTION INTRAMUSCULAR; INTRAVENOUS; SUBCUTANEOUS at 19:34

## 2022-10-05 RX ADMIN — METRONIDAZOLE 500 MG: 500 INJECTION, SOLUTION INTRAVENOUS at 14:12

## 2022-10-05 RX ADMIN — Medication 250 MG: at 08:24

## 2022-10-05 RX ADMIN — SODIUM CHLORIDE 125 ML/HR: 0.9 INJECTION, SOLUTION INTRAVENOUS at 22:26

## 2022-10-05 RX ADMIN — ENOXAPARIN SODIUM 30 MG: 30 INJECTION SUBCUTANEOUS at 08:24

## 2022-10-05 RX ADMIN — DULOXETINE HYDROCHLORIDE 60 MG: 60 CAPSULE, DELAYED RELEASE ORAL at 08:24

## 2022-10-05 RX ADMIN — HYDROMORPHONE HYDROCHLORIDE 0.5 MG: 1 INJECTION, SOLUTION INTRAMUSCULAR; INTRAVENOUS; SUBCUTANEOUS at 16:11

## 2022-10-05 RX ADMIN — IOHEXOL 100 ML: 350 INJECTION, SOLUTION INTRAVENOUS at 15:42

## 2022-10-05 RX ADMIN — HYDROMORPHONE HYDROCHLORIDE 0.5 MG: 1 INJECTION, SOLUTION INTRAMUSCULAR; INTRAVENOUS; SUBCUTANEOUS at 22:39

## 2022-10-05 RX ADMIN — HYDROMORPHONE HYDROCHLORIDE 0.5 MG: 1 INJECTION, SOLUTION INTRAMUSCULAR; INTRAVENOUS; SUBCUTANEOUS at 05:03

## 2022-10-05 RX ADMIN — METHYLPREDNISOLONE SODIUM SUCCINATE 20 MG: 40 INJECTION, POWDER, FOR SOLUTION INTRAMUSCULAR; INTRAVENOUS at 05:03

## 2022-10-05 RX ADMIN — METHYLPREDNISOLONE SODIUM SUCCINATE 20 MG: 40 INJECTION, POWDER, FOR SOLUTION INTRAMUSCULAR; INTRAVENOUS at 22:25

## 2022-10-05 NOTE — PROGRESS NOTES
Progress Note - Celestino Abad 32 y o  male MRN: [id number]    Unit/Bed#: [address] [place] [address] Encounter: [id number]        Assessment/Plan:  IBD with small bowel obstruction: patient previously dx with UC however due to active disease noted in small bowel, CT scan 9/29 with dilated neorectum with mural thickening and distal ileal thickening, pre-anastomotic bowel loop also dilated  , xray on 10/2 showing slight improvement in distention, having bowel movements and passing gas, no vomiting, less distention  -continue IV steroids, will decide about taper regimen after CT enterography report  -can follow up with surgeon outpatient  -diet was advanced to low residue lactose restricted  -now noted on most recent CTs to have distal ileal thickening and patient was previously taking biologic therapy for ankylosing spondylitis which was discontinued earlier this year and then he has had frequent admissions since then  -repeat flex sig yesterday had shown ileal pouch and anal anastomosis appeared normal with no stricture angulation or inflammation seen with normal-appearing mucosa  -plan was for surgical revision in January at [facility] but was supposed to have MRI of pelvis and surgeon wanted study performed there and we recommended MR enterography which we ordered but spoke with MRI department and they do not do these on an inpatient basis so will order CT enterography for further evaluation at this time    Subjective:   Patient is lying in bed  He states he still has suprapubic pain and burning in feels like a rock is in his abdomen after eating and has been requiring Dilaudid around the clock  Patient feels like he can move his bowels and denies any nausea vomiting  Diet was advanced to low residue      Objective:     Vitals: /97   Pulse 69   Temp 97 8 °F (36 6 °C)   Resp 18   Ht 5' 9" (1 753 m)   Wt 82 6 kg (182 lb)   SpO2 95%   BMI 26 88 kg/m²       Physical Exam:  Gen-alert, nad  Abd-+bs, some vague diffuse tenderness palpation nondistended soft, no rebound rigidity or guarding       Lab, Imaging and other studies:   Recent Results (from the past 72 hour(s))   Comprehensive metabolic panel    Collection Time: 10/03/22  5:50 AM   Result Value Ref Range    Sodium 138 135 - 147 mmol/L    Potassium 3 8 3 5 - 5 3 mmol/L    Chloride 104 96 - 108 mmol/L    CO2 28 21 - 32 mmol/L    ANION GAP 6 4 - 13 mmol/L    BUN 10 5 - 25 mg/dL    Creatinine 0 84 0 60 - 1 30 mg/dL    Glucose 125 65 - 140 mg/dL    Calcium 8 2 (L) 8 3 - 10 1 mg/dL    Corrected Calcium 9 1 8 3 - 10 1 mg/dL    AST 8 5 - 45 U/L    ALT 18 12 - 78 U/L    Alkaline Phosphatase 48 46 - 116 U/L    Total Protein 5 9 (L) 6 4 - 8 4 g/dL    Albumin 2 9 (L) 3 5 - 5 0 g/dL    Total Bilirubin 0 69 0 20 - 1 00 mg/dL    eGFR 118 ml/min/1 73sq m   CBC and differential    Collection Time: 10/03/22  5:50 AM   Result Value Ref Range    WBC 11 96 (H) 4 31 - 10 16 Thousand/uL    RBC 5 25 3 88 - 5 62 Million/uL    Hemoglobin 10 0 (L) 12 0 - 17 0 g/dL    Hematocrit 33 1 (L) 36 5 - 49 3 %    MCV 63 (L) 82 - 98 fL    MCH 19 0 (L) 26 8 - 34 3 pg    MCHC 30 2 (L) 31 4 - 37 4 g/dL    RDW 16 4 (H) 11 6 - 15 1 %    MPV 9 1 8 9 - 12 7 fL    Platelets 959 (H) 685 - 390 Thousands/uL    nRBC 0 /100 WBCs    Neutrophils Relative 86 (H) 43 - 75 %    Immat GRANS % 1 0 - 2 %    Lymphocytes Relative 7 (L) 14 - 44 %    Monocytes Relative 6 4 - 12 %    Eosinophils Relative 0 0 - 6 %    Basophils Relative 0 0 - 1 %    Neutrophils Absolute 10 25 (H) 1 85 - 7 62 Thousands/µL    Immature Grans Absolute 0 06 0 00 - 0 20 Thousand/uL    Lymphocytes Absolute 0 89 0 60 - 4 47 Thousands/µL    Monocytes Absolute 0 75 0 17 - 1 22 Thousand/µL    Eosinophils Absolute 0 00 0 00 - 0 61 Thousand/µL    Basophils Absolute 0 01 0 00 - 0 10 Thousands/µL   COVID only    Collection Time: 10/03/22  5:59 PM    Specimen: Nose; Nares   Result Value Ref Range    SARS-CoV-2 Negative Negative   Comprehensive metabolic panel Collection Time: 10/04/22  6:42 AM   Result Value Ref Range    Sodium 136 135 - 147 mmol/L    Potassium 3 8 3 5 - 5 3 mmol/L    Chloride 102 96 - 108 mmol/L    CO2 28 21 - 32 mmol/L    ANION GAP 6 4 - 13 mmol/L    BUN 10 5 - 25 mg/dL    Creatinine 0 81 0 60 - 1 30 mg/dL    Glucose 111 65 - 140 mg/dL    Calcium 8 2 (L) 8 3 - 10 1 mg/dL    Corrected Calcium 9 2 8 3 - 10 1 mg/dL    AST 6 5 - 45 U/L    ALT 17 12 - 78 U/L    Alkaline Phosphatase 48 46 - 116 U/L    Total Protein 5 8 (L) 6 4 - 8 4 g/dL    Albumin 2 8 (L) 3 5 - 5 0 g/dL    Total Bilirubin 0 69 0 20 - 1 00 mg/dL    eGFR 120 ml/min/1 73sq m   CBC and differential    Collection Time: 10/04/22  6:42 AM   Result Value Ref Range    WBC 15 39 (H) 4 31 - 10 16 Thousand/uL    RBC 5 35 3 88 - 5 62 Million/uL    Hemoglobin 10 2 (L) 12 0 - 17 0 g/dL    Hematocrit 33 6 (L) 36 5 - 49 3 %    MCV 63 (L) 82 - 98 fL    MCH 19 1 (L) 26 8 - 34 3 pg    MCHC 30 4 (L) 31 4 - 37 4 g/dL    RDW 15 9 (H) 11 6 - 15 1 %    MPV 9 0 8 9 - 12 7 fL    Platelets 876 (H) 956 - 390 Thousands/uL    nRBC 0 /100 WBCs    Neutrophils Relative 83 (H) 43 - 75 %    Immat GRANS % 1 0 - 2 %    Lymphocytes Relative 9 (L) 14 - 44 %    Monocytes Relative 7 4 - 12 %    Eosinophils Relative 0 0 - 6 %    Basophils Relative 0 0 - 1 %    Neutrophils Absolute 12 96 (H) 1 85 - 7 62 Thousands/µL    Immature Grans Absolute 0 10 0 00 - 0 20 Thousand/uL    Lymphocytes Absolute 1 31 0 60 - 4 47 Thousands/µL    Monocytes Absolute 1 01 0 17 - 1 22 Thousand/µL    Eosinophils Absolute 0 00 0 00 - 0 61 Thousand/µL    Basophils Absolute 0 01 0 00 - 0 10 Thousands/µL   Comprehensive metabolic panel    Collection Time: 10/05/22  5:55 AM   Result Value Ref Range    Sodium 137 135 - 147 mmol/L    Potassium 3 6 3 5 - 5 3 mmol/L    Chloride 102 96 - 108 mmol/L    CO2 28 21 - 32 mmol/L    ANION GAP 7 4 - 13 mmol/L    BUN 12 5 - 25 mg/dL    Creatinine 0 87 0 60 - 1 30 mg/dL    Glucose 151 (H) 65 - 140 mg/dL    Calcium 8 1 (L) 8 3 - 10 1 mg/dL    Corrected Calcium 9 1 8 3 - 10 1 mg/dL    AST 8 5 - 45 U/L    ALT 16 12 - 78 U/L    Alkaline Phosphatase 54 46 - 116 U/L    Total Protein 5 6 (L) 6 4 - 8 4 g/dL    Albumin 2 8 (L) 3 5 - 5 0 g/dL    Total Bilirubin 0 46 0 20 - 1 00 mg/dL    eGFR 116 ml/min/1 73sq m   CBC and differential    Collection Time: 10/05/22  5:55 AM   Result Value Ref Range    WBC 13 99 (H) 4 31 - 10 16 Thousand/uL    RBC 5 47 3 88 - 5 62 Million/uL    Hemoglobin 10 6 (L) 12 0 - 17 0 g/dL    Hematocrit 34 5 (L) 36 5 - 49 3 %    MCV 63 (L) 82 - 98 fL    MCH 19 4 (L) 26 8 - 34 3 pg    MCHC 30 7 (L) 31 4 - 37 4 g/dL    RDW 15 9 (H) 11 6 - 15 1 %    MPV 9 1 8 9 - 12 7 fL    Platelets 351 (H) 490 - 390 Thousands/uL    nRBC 0 /100 WBCs    Neutrophils Relative 82 (H) 43 - 75 %    Immat GRANS % 1 0 - 2 %    Lymphocytes Relative 9 (L) 14 - 44 %    Monocytes Relative 8 4 - 12 %    Eosinophils Relative 0 0 - 6 %    Basophils Relative 0 0 - 1 %    Neutrophils Absolute 11 39 (H) 1 85 - 7 62 Thousands/µL    Immature Grans Absolute 0 09 0 00 - 0 20 Thousand/uL    Lymphocytes Absolute 1 32 0 60 - 4 47 Thousands/µL    Monocytes Absolute 1 18 0 17 - 1 22 Thousand/µL    Eosinophils Absolute 0 00 0 00 - 0 61 Thousand/µL    Basophils Absolute 0 01 0 00 - 0 10 Thousands/µL

## 2022-10-05 NOTE — PLAN OF CARE
Problem: METABOLIC, FLUID AND ELECTROLYTES - ADULT  Goal: Electrolytes maintained within normal limits  Description: INTERVENTIONS:  - Monitor labs and assess patient for signs and symptoms of electrolyte imbalances  - Administer electrolyte replacement as ordered  - Monitor response to electrolyte replacements, including repeat lab results as appropriate  - Instruct patient on fluid and nutrition as appropriate  Outcome: Progressing  Goal: Fluid balance maintained  Description: INTERVENTIONS:  - Monitor labs   - Monitor I/O and WT  - Instruct patient on fluid and nutrition as appropriate  - Assess for signs & symptoms of volume excess or deficit  Outcome: Progressing     Problem: PAIN - ADULT  Goal: Verbalizes/displays adequate comfort level or baseline comfort level  Description: Interventions:  - Encourage patient to monitor pain and request assistance  - Assess pain using appropriate pain scale  - Administer analgesics based on type and severity of pain and evaluate response  - Implement non-pharmacological measures as appropriate and evaluate response  - Consider cultural and social influences on pain and pain management  - Notify physician/advanced practitioner if interventions unsuccessful or patient reports new pain  Outcome: Progressing     Problem: GASTROINTESTINAL - ADULT  Goal: Minimal or absence of nausea and/or vomiting  Description: INTERVENTIONS:  - Administer IV fluids if ordered to ensure adequate hydration  - Maintain NPO status until nausea and vomiting are resolved  - Nasogastric tube if ordered  - Administer ordered antiemetic medications as needed  - Provide nonpharmacologic comfort measures as appropriate  - Advance diet as tolerated, if ordered  - Consider nutrition services referral to assist patient with adequate nutrition and appropriate food choices  Outcome: Progressing  Goal: Maintains or returns to baseline bowel function  Description: INTERVENTIONS:  - Assess bowel function  - Encourage oral fluids to ensure adequate hydration  - Administer IV fluids if ordered to ensure adequate hydration  - Administer ordered medications as needed  - Encourage mobilization and activity  - Consider nutritional services referral to assist patient with adequate nutrition and appropriate food choices  Outcome: Progressing  Goal: Maintains adequate nutritional intake  Description: INTERVENTIONS:  - Monitor percentage of each meal consumed  - Identify factors contributing to decreased intake, treat as appropriate  - Assist with meals as needed  - Monitor I&O, weight, and lab values if indicated  - Obtain nutrition services referral as needed  Outcome: Progressing     Problem: Nutrition/Hydration-ADULT  Goal: Nutrient/Hydration intake appropriate for improving, restoring or maintaining nutritional needs  Description: Monitor and assess patient's nutrition/hydration status for malnutrition  Collaborate with interdisciplinary team and initiate plan and interventions as ordered  Monitor patient's weight and dietary intake as ordered or per policy  Utilize nutrition screening tool and intervene as necessary  Determine patient's food preferences and provide high-protein, high-caloric foods as appropriate       INTERVENTIONS:  - Monitor oral intake, urinary output, labs, and treatment plans  - Assess nutrition and hydration status and recommend course of action  - Evaluate amount of meals eaten  - Assist patient with eating if necessary   - Allow adequate time for meals  - Recommend/ encourage appropriate diets, oral nutritional supplements, and vitamin/mineral supplements  - Order, calculate, and assess calorie counts as needed  - Recommend, monitor, and adjust tube feedings and TPN/PPN based on assessed needs  - Assess need for intravenous fluids  - Provide specific nutrition/hydration education as appropriate  - Include patient/family/caregiver in decisions related to nutrition  Outcome: Progressing

## 2022-10-06 LAB
ALBUMIN SERPL BCP-MCNC: 2.9 G/DL (ref 3.5–5)
ALP SERPL-CCNC: 54 U/L (ref 46–116)
ALT SERPL W P-5'-P-CCNC: 32 U/L (ref 12–78)
ANION GAP SERPL CALCULATED.3IONS-SCNC: 3 MMOL/L (ref 4–13)
AST SERPL W P-5'-P-CCNC: 6 U/L (ref 5–45)
BASOPHILS # BLD AUTO: 0.02 THOUSANDS/ΜL (ref 0–0.1)
BASOPHILS NFR BLD AUTO: 0 % (ref 0–1)
BILIRUB SERPL-MCNC: 0.49 MG/DL (ref 0.2–1)
BUN SERPL-MCNC: 10 MG/DL (ref 5–25)
CALCIUM ALBUM COR SERPL-MCNC: 9.2 MG/DL (ref 8.3–10.1)
CALCIUM SERPL-MCNC: 8.3 MG/DL (ref 8.3–10.1)
CHLORIDE SERPL-SCNC: 103 MMOL/L (ref 96–108)
CO2 SERPL-SCNC: 30 MMOL/L (ref 21–32)
CREAT SERPL-MCNC: 0.78 MG/DL (ref 0.6–1.3)
EOSINOPHIL # BLD AUTO: 0 THOUSAND/ΜL (ref 0–0.61)
EOSINOPHIL NFR BLD AUTO: 0 % (ref 0–6)
ERYTHROCYTE [DISTWIDTH] IN BLOOD BY AUTOMATED COUNT: 16.5 % (ref 11.6–15.1)
GFR SERPL CREATININE-BSD FRML MDRD: 121 ML/MIN/1.73SQ M
GLUCOSE SERPL-MCNC: 123 MG/DL (ref 65–140)
HCT VFR BLD AUTO: 34.2 % (ref 36.5–49.3)
HGB BLD-MCNC: 10.4 G/DL (ref 12–17)
IMM GRANULOCYTES # BLD AUTO: 0.1 THOUSAND/UL (ref 0–0.2)
IMM GRANULOCYTES NFR BLD AUTO: 1 % (ref 0–2)
LYMPHOCYTES # BLD AUTO: 1.04 THOUSANDS/ΜL (ref 0.6–4.47)
LYMPHOCYTES NFR BLD AUTO: 6 % (ref 14–44)
MCH RBC QN AUTO: 19.1 PG (ref 26.8–34.3)
MCHC RBC AUTO-ENTMCNC: 30.4 G/DL (ref 31.4–37.4)
MCV RBC AUTO: 63 FL (ref 82–98)
MONOCYTES # BLD AUTO: 1.03 THOUSAND/ΜL (ref 0.17–1.22)
MONOCYTES NFR BLD AUTO: 6 % (ref 4–12)
NEUTROPHILS # BLD AUTO: 16.53 THOUSANDS/ΜL (ref 1.85–7.62)
NEUTS SEG NFR BLD AUTO: 87 % (ref 43–75)
NRBC BLD AUTO-RTO: 0 /100 WBCS
PLATELET # BLD AUTO: 439 THOUSANDS/UL (ref 149–390)
PMV BLD AUTO: 9 FL (ref 8.9–12.7)
POTASSIUM SERPL-SCNC: 3.8 MMOL/L (ref 3.5–5.3)
PROT SERPL-MCNC: 5.7 G/DL (ref 6.4–8.4)
RBC # BLD AUTO: 5.44 MILLION/UL (ref 3.88–5.62)
SODIUM SERPL-SCNC: 136 MMOL/L (ref 135–147)
WBC # BLD AUTO: 18.72 THOUSAND/UL (ref 4.31–10.16)

## 2022-10-06 PROCEDURE — C9113 INJ PANTOPRAZOLE SODIUM, VIA: HCPCS | Performed by: INTERNAL MEDICINE

## 2022-10-06 PROCEDURE — 85025 COMPLETE CBC W/AUTO DIFF WBC: CPT | Performed by: INTERNAL MEDICINE

## 2022-10-06 PROCEDURE — 99232 SBSQ HOSP IP/OBS MODERATE 35: CPT | Performed by: INTERNAL MEDICINE

## 2022-10-06 PROCEDURE — 80053 COMPREHEN METABOLIC PANEL: CPT | Performed by: INTERNAL MEDICINE

## 2022-10-06 PROCEDURE — 99232 SBSQ HOSP IP/OBS MODERATE 35: CPT | Performed by: FAMILY MEDICINE

## 2022-10-06 RX ORDER — ONDANSETRON 4 MG/1
4 TABLET, ORALLY DISINTEGRATING ORAL EVERY 6 HOURS PRN
Status: DISCONTINUED | OUTPATIENT
Start: 2022-10-06 | End: 2022-10-07 | Stop reason: HOSPADM

## 2022-10-06 RX ORDER — TRAMADOL HYDROCHLORIDE 50 MG/1
50 TABLET ORAL EVERY 4 HOURS PRN
Status: DISCONTINUED | OUTPATIENT
Start: 2022-10-06 | End: 2022-10-07 | Stop reason: HOSPADM

## 2022-10-06 RX ORDER — HYDROMORPHONE HCL/PF 1 MG/ML
0.5 SYRINGE (ML) INJECTION EVERY 4 HOURS PRN
Status: DISCONTINUED | OUTPATIENT
Start: 2022-10-06 | End: 2022-10-07 | Stop reason: HOSPADM

## 2022-10-06 RX ADMIN — ENOXAPARIN SODIUM 30 MG: 30 INJECTION SUBCUTANEOUS at 08:44

## 2022-10-06 RX ADMIN — HYDROMORPHONE HYDROCHLORIDE 0.5 MG: 1 INJECTION, SOLUTION INTRAMUSCULAR; INTRAVENOUS; SUBCUTANEOUS at 08:44

## 2022-10-06 RX ADMIN — METHYLPREDNISOLONE SODIUM SUCCINATE 20 MG: 40 INJECTION, POWDER, FOR SOLUTION INTRAMUSCULAR; INTRAVENOUS at 21:20

## 2022-10-06 RX ADMIN — METRONIDAZOLE 500 MG: 500 INJECTION, SOLUTION INTRAVENOUS at 05:55

## 2022-10-06 RX ADMIN — METHYLPREDNISOLONE SODIUM SUCCINATE 20 MG: 40 INJECTION, POWDER, FOR SOLUTION INTRAMUSCULAR; INTRAVENOUS at 13:57

## 2022-10-06 RX ADMIN — METRONIDAZOLE 500 MG: 500 INJECTION, SOLUTION INTRAVENOUS at 13:57

## 2022-10-06 RX ADMIN — HYDROMORPHONE HYDROCHLORIDE 0.5 MG: 1 INJECTION, SOLUTION INTRAMUSCULAR; INTRAVENOUS; SUBCUTANEOUS at 05:56

## 2022-10-06 RX ADMIN — DULOXETINE HYDROCHLORIDE 60 MG: 60 CAPSULE, DELAYED RELEASE ORAL at 08:44

## 2022-10-06 RX ADMIN — HYDROMORPHONE HYDROCHLORIDE 0.5 MG: 1 INJECTION, SOLUTION INTRAMUSCULAR; INTRAVENOUS; SUBCUTANEOUS at 12:18

## 2022-10-06 RX ADMIN — HYDROMORPHONE HYDROCHLORIDE 0.5 MG: 1 INJECTION, SOLUTION INTRAMUSCULAR; INTRAVENOUS; SUBCUTANEOUS at 16:59

## 2022-10-06 RX ADMIN — HYDROMORPHONE HYDROCHLORIDE 0.5 MG: 1 INJECTION, SOLUTION INTRAMUSCULAR; INTRAVENOUS; SUBCUTANEOUS at 21:19

## 2022-10-06 RX ADMIN — SODIUM CHLORIDE 125 ML/HR: 0.9 INJECTION, SOLUTION INTRAVENOUS at 08:46

## 2022-10-06 RX ADMIN — Medication 250 MG: at 08:44

## 2022-10-06 RX ADMIN — PANTOPRAZOLE SODIUM 40 MG: 40 INJECTION, POWDER, FOR SOLUTION INTRAVENOUS at 21:20

## 2022-10-06 RX ADMIN — METHYLPREDNISOLONE SODIUM SUCCINATE 20 MG: 40 INJECTION, POWDER, FOR SOLUTION INTRAMUSCULAR; INTRAVENOUS at 05:55

## 2022-10-06 RX ADMIN — Medication 250 MG: at 17:00

## 2022-10-06 RX ADMIN — PANTOPRAZOLE SODIUM 40 MG: 40 INJECTION, POWDER, FOR SOLUTION INTRAVENOUS at 08:44

## 2022-10-06 RX ADMIN — HYDROMORPHONE HYDROCHLORIDE 0.5 MG: 1 INJECTION, SOLUTION INTRAMUSCULAR; INTRAVENOUS; SUBCUTANEOUS at 02:41

## 2022-10-06 RX ADMIN — METRONIDAZOLE 500 MG: 500 INJECTION, SOLUTION INTRAVENOUS at 21:21

## 2022-10-06 NOTE — PROGRESS NOTES
Progress Note- Jeanine Saldana 32 y o  male MRN: [id number]    Unit/Bed#: 2 [place] [address] Encounter: [id number]      Assessment and Plan:    IBD with small bowel obstruction: patient previously dx with UC however due to active disease noted in small bowel concern for misdiagnosis with actually Crohn's disease  Patient was previously taking biologic therapy Columba Mulch) for ankylosing spondylitis which was discontinued earlier this year and then he has had frequent admissions since then  CT scan 9/29: dilated neorectum with mural thickening and distal ileal thickening, pre-anastomotic bowel loop also dilated  Naima Kerns 10/2 showed slight improvement in distention, & pt having bowel movements and passing gas, no vomiting, less distention  Repeat flex sig 10/3 showed ileal pouch and anal anastomosis appeared normal with no stricture angulation or inflammation seen with normal-appearing mucosa  CT enterography 10/5 shows active inflammatory small-bowel disease without luminal narrowing involving the neorectum any long segment of pre anastomotic small bowel along the right abdomen     -Continue IV steroids, will need prednisone taper on discharge  Recommend starting with prednisone 40 mg daily and titrating down by 5 mg weekly  Discussed with patient and if he has worsening symptoms when tapering down to contact GI office   -Continue low residue lactose restricted diet & avoidance of NSAIDs  -Continue Flagyl for history pouchitis  -Recommend follow up with [facility] specialist Dr Vielka Loredo or Dr Yony Daniels within 2 weeks of discharge  QuantiFERON TB gold/hepatitis serologies performed at the end of September  Patient will likely need to reinitiate biologic therapy due to suspected Crohn's   -Plan was for MRI pelvis & surgical revision in January at [address] [facility] had previously recommended MRI enterography, however according to MRI department this cannot be done inpatient thus CT enterography performed   Pt can follow up with surgeon outpatient   -okay for discharge from GI standpoint once pain controlled off IV regimen    ______________________________________________________________________    Subjective:     Patient reports he still has postprandial diarrhea/abdominal pain  He has moved his bowels 2-3 times today  Loose, no blood  Denies any vomiting  Appears comfortable sitting in bed and does not appear to have any abdominal pain on palpation    Working on reduction of pain medication with primary team     Medication Administration - last 24 hours from 10/05/2022 1800 to 10/06/2022 1800       Date/Time Order Dose Route Action Action by     10/06/2022 0844 DULoxetine (CYMBALTA) delayed release capsule 60 mg 60 mg Oral Given Carnella Seed, RN     10/06/2022 1700 saccharomyces boulardii (FLORASTOR) capsule 250 mg 250 mg Oral Given Carnella Seed, RN     10/06/2022 7385 saccharomyces boulardii (FLORASTOR) capsule 250 mg 250 mg Oral Given Carnella Seed, RN     10/05/2022 1820 saccharomyces boulardii (FLORASTOR) capsule 250 mg 250 mg Oral Given Carnella Seed, RN     10/06/2022 1700 sodium chloride 0 9 % infusion 75 mL/hr Intravenous Rate/Dose Change Carnella Seed, RN     10/06/2022 0846 sodium chloride 0 9 % infusion 125 mL/hr Intravenous New Bag Carnella Seed, RN     10/05/2022 2226 sodium chloride 0 9 % infusion 125 mL/hr Intravenous New Bag Comfort Anim-Charlie, RN     10/06/2022 0844 enoxaparin (LOVENOX) subcutaneous injection 30 mg 30 mg Subcutaneous Given Carnella Seed, RN     10/06/2022 1357 metroNIDAZOLE (FLAGYL) IVPB (premix) 500 mg 100 mL 500 mg Intravenous [address] Carnella Seed, RN     10/06/2022 0555 metroNIDAZOLE (FLAGYL) IVPB (premix) 500 mg 100 mL 500 mg Intravenous New Bag Comfort Anim-Charlie, RN     10/05/2022 2226 metroNIDAZOLE (FLAGYL) IVPB (premix) 500 mg 100 mL 500 mg Intravenous New Bag Comfort Anim-Charlie, ZACHARY     10/05/2022 8283 ondansetron (ZOFRAN) injection 4 mg 4 mg Intravenous Given Comfort Dale, RN     10/06/2022 7966 methylPREDNISolone sodium succinate (Solu-MEDROL) injection 20 mg 20 mg Intravenous Given Jessica Hart, RN     10/06/2022 0555 methylPREDNISolone sodium succinate (Solu-MEDROL) injection 20 mg 20 mg Intravenous Given Comfort Anim-Charlie, RN     10/05/2022 2225 methylPREDNISolone sodium succinate (Solu-MEDROL) injection 20 mg 20 mg Intravenous Given Comfort Anim-Charlie, RN     10/06/2022 1218 HYDROmorphone (DILAUDID) injection 0 5 mg 0 5 mg Intravenous Given Jessica Hart, RN     10/06/2022 0844 HYDROmorphone (DILAUDID) injection 0 5 mg 0 5 mg Intravenous Given Jessica Hart, RN     10/06/2022 0556 HYDROmorphone (DILAUDID) injection 0 5 mg 0 5 mg Intravenous Given Comfort Anim-Charlie, RN     10/06/2022 0241 HYDROmorphone (DILAUDID) injection 0 5 mg 0 5 mg Intravenous Given Comfort Anim-Charlie, RN     10/05/2022 2239 HYDROmorphone (DILAUDID) injection 0 5 mg 0 5 mg Intravenous Given Comfort Anim-Charlie, RN     10/05/2022 1934 HYDROmorphone (DILAUDID) injection 0 5 mg 0 5 mg Intravenous Given Comfort Anim-Charlie, RN     10/06/2022 0844 pantoprazole (PROTONIX) injection 40 mg 40 mg Intravenous Given Jessica Hart, RN     10/05/2022 2224 pantoprazole (PROTONIX) injection 40 mg 40 mg Intravenous Given Comfort Anim-Charlie, RN     10/06/2022 1659 HYDROmorphone (DILAUDID) injection 0 5 mg 0 5 mg Intravenous Given Jessica Hart, RN          Objective:     Vitals: Blood pressure 155/94, pulse 63, temperature (!) 97 4 °F (36 3 °C), resp  rate 17, height 5' 9" (1 753 m), weight 82 6 kg (182 lb), SpO2 97 %  ,Body mass index is 26 88 kg/m²        Intake/Output Summary (Last 24 hours) at 10/6/2022 1800  Last data filed at 10/5/2022 2226  Gross per 24 hour   Intake 1000 ml   Output --   Net 1000 ml       Physical Exam:   General Appearance: Awake and alert, in no acute distress  Abdomen: Soft, non-tender, non-distended; bowel sounds normal; no masses or no organomegaly    Invasive Devices  Report    Peripheral Intravenous Line Duration           Peripheral IV 10/05/22 Left;Ventral (anterior) Forearm 1 day                Lab Results:  No results displayed because visit has over 200 results  Imaging Studies: I have personally reviewed pertinent imaging studies

## 2022-10-06 NOTE — PLAN OF CARE
Problem: METABOLIC, FLUID AND ELECTROLYTES - ADULT  Goal: Electrolytes maintained within normal limits  Description: INTERVENTIONS:  - Monitor labs and assess patient for signs and symptoms of electrolyte imbalances  - Administer electrolyte replacement as ordered  - Monitor response to electrolyte replacements, including repeat lab results as appropriate  - Instruct patient on fluid and nutrition as appropriate  Outcome: Progressing  Goal: Fluid balance maintained  Description: INTERVENTIONS:  - Monitor labs   - Monitor I/O and WT  - Instruct patient on fluid and nutrition as appropriate  - Assess for signs & symptoms of volume excess or deficit  Outcome: Progressing     Problem: PAIN - ADULT  Goal: Verbalizes/displays adequate comfort level or baseline comfort level  Description: Interventions:  - Encourage patient to monitor pain and request assistance  - Assess pain using appropriate pain scale  - Administer analgesics based on type and severity of pain and evaluate response  - Implement non-pharmacological measures as appropriate and evaluate response  - Consider cultural and social influences on pain and pain management  - Notify physician/advanced practitioner if interventions unsuccessful or patient reports new pain  Outcome: Progressing     Problem: GASTROINTESTINAL - ADULT  Goal: Minimal or absence of nausea and/or vomiting  Description: INTERVENTIONS:  - Administer IV fluids if ordered to ensure adequate hydration  - Maintain NPO status until nausea and vomiting are resolved  - Nasogastric tube if ordered  - Administer ordered antiemetic medications as needed  - Provide nonpharmacologic comfort measures as appropriate  - Advance diet as tolerated, if ordered  - Consider nutrition services referral to assist patient with adequate nutrition and appropriate food choices  Outcome: Progressing  Goal: Maintains or returns to baseline bowel function  Description: INTERVENTIONS:  - Assess bowel function  - Encourage oral fluids to ensure adequate hydration  - Administer IV fluids if ordered to ensure adequate hydration  - Administer ordered medications as needed  - Encourage mobilization and activity  - Consider nutritional services referral to assist patient with adequate nutrition and appropriate food choices  Outcome: Progressing  Goal: Maintains adequate nutritional intake  Description: INTERVENTIONS:  - Monitor percentage of each meal consumed  - Identify factors contributing to decreased intake, treat as appropriate  - Assist with meals as needed  - Monitor I&O, weight, and lab values if indicated  - Obtain nutrition services referral as needed  Outcome: Progressing     Problem: Nutrition/Hydration-ADULT  Goal: Nutrient/Hydration intake appropriate for improving, restoring or maintaining nutritional needs  Description: Monitor and assess patient's nutrition/hydration status for malnutrition  Collaborate with interdisciplinary team and initiate plan and interventions as ordered  Monitor patient's weight and dietary intake as ordered or per policy  Utilize nutrition screening tool and intervene as necessary  Determine patient's food preferences and provide high-protein, high-caloric foods as appropriate       INTERVENTIONS:  - Monitor oral intake, urinary output, labs, and treatment plans  - Assess nutrition and hydration status and recommend course of action  - Evaluate amount of meals eaten  - Assist patient with eating if necessary   - Allow adequate time for meals  - Recommend/ encourage appropriate diets, oral nutritional supplements, and vitamin/mineral supplements  - Order, calculate, and assess calorie counts as needed  - Recommend, monitor, and adjust tube feedings and TPN/PPN based on assessed needs  - Assess need for intravenous fluids  - Provide specific nutrition/hydration education as appropriate  - Include patient/family/caregiver in decisions related to nutrition  Outcome: Progressing     Problem: Potential for Falls  Goal: Patient will remain free of falls  Description: INTERVENTIONS:  - Educate patient/family on patient safety including physical limitations  - Instruct patient to call for assistance with activity   - Consult OT/PT to assist with strengthening/mobility   - Keep Call bell within reach  - Keep bed low and locked with side rails adjusted as appropriate  - Keep care items and personal belongings within reach  - Initiate and maintain comfort rounds  - Make Fall Risk Sign visible to staff  - Offer Toileting every  Hours, in advance of need  - Initiate/Maintain alarm  - Obtain necessary fall risk management equipment:  - Apply yellow socks and bracelet for high fall risk patients  - Consider moving patient to room near nurses station  Outcome: Progressing

## 2022-10-06 NOTE — PROGRESS NOTES
Julian 43  Progress Note - Tatiana Jamil 1995, 32 y o  male MRN: [id number]  Unit/Bed#: [address] [place] [address] Encounter: [id number]  Primary Care Provider: Carlos Manuel Olivia DO   Date and time admitted to hospital: 9/30/2022  9:31 AM    * Abdominal pain  Assessment & Plan  Patient presented to the ER with abdominal pain   · Continue 20 mg IV Solu-Medrol q 8 hours  · CT ab: Post colectomy  The neorectum is dilated with mural thickening  Distal ileum with wall thickening  There is a second bowel anastomosis slightly proximal to the spine  Dilated Preanastomotic bowel loop  · Recent obstruction series with decrease in bowel distension  · Continue Flagyl  · GI recommending starting biologics as outpatient as he was previously on biologic therapy for ankylosing spondylitis which was discontinued earlier this year  Since then patient has been admitted multiple times with abdominal pain  · CT enterography showed active inflammatory small bowel disease involving long segment of preanastomotic small bowel and neorectum  · Per GI plan for prednisone taper starting at 40 mg and decrease by 5 mg every week  · Discussed with patient to follow up with IBD specialist after discharge  · Add tramadol p r n   For pain control and change Dilaudid for breakthrough pain as patient states that Percocet upsets his stomach but is able to tolerate tramadol        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[address] )  Assessment & Plan  Status post pouchoscopy on 10/4 which was negative for stricture or inflammation  · Diet has been advanced to solids  · Flagyl and steroids as noted above    SBO (small bowel obstruction) (HCC)-resolved as of 10/5/2022  Assessment & Plan  Stable  · Reports having BMs, nonbloody and positive for gas  · Abdominal x-ray as noted above  · Patient refused NG tube for decompression  · Resolved        VTE Pharmacologic Prophylaxis:   Lovenox    Patient Centered Rounds: I performed bedside rounds with nursing staff today   Discussions with Specialists or Other Care Team Provider: yes - GI    Education and Discussions with Family / Patient: yes    Time Spent for Care: 30 minutes  More than 50% of total time spent on counseling and coordination of care as described above  Current Length of Stay: 6 day(s)  Current Patient Status: Inpatient   Certification Statement: The patient will continue to require additional inpatient hospital stay due to Abdominal pain with IBD requiring IV steroids  Discharge Plan: Anticipate discharge tomorrow to home  Code Status: Level 1 - Full Code    Subjective:     Patient still with abdominal pain although improving from before  Nervous about going home and then ending right back in the hospital    Objective:     Vitals:   Temp (24hrs), Av 8 °F (36 6 °C), Min:97 4 °F (36 3 °C), Max:98 1 °F (36 7 °C)    Temp:  [97 4 °F (36 3 °C)-98 1 °F (36 7 °C)] 97 4 °F (36 3 °C)  HR:  [63-75] 63  Resp:  [17] 17  BP: (141-155)/(85-94) 155/94  SpO2:  [95 %-97 %] 97 %  Body mass index is 26 88 kg/m²  Input and Output Summary (last 24 hours): Intake/Output Summary (Last 24 hours) at 10/6/2022 1614  Last data filed at 10/5/2022 2226  Gross per 24 hour   Intake 1000 ml   Output --   Net 1000 ml       Physical Exam:   Physical Exam  Vitals reviewed  Constitutional:       General: He is not in acute distress  Appearance: He is not ill-appearing  HENT:      Head: Normocephalic and atraumatic  Eyes:      General:         Right eye: No discharge  Left eye: No discharge  Cardiovascular:      Rate and Rhythm: Normal rate and regular rhythm  Pulmonary:      Effort: Pulmonary effort is normal  No respiratory distress  Breath sounds: Normal breath sounds  No wheezing or rales  Abdominal:      General: Bowel sounds are normal  There is no distension  Palpations: Abdomen is soft  Tenderness: There is abdominal tenderness (Mild lower abdomen)  There is no guarding  Neurological:      Mental Status: He is alert and oriented to person, place, and time  Additional Data:     Labs:  Results from last 7 days   Lab Units 10/06/22  0601   WBC Thousand/uL 18 72*   HEMOGLOBIN g/dL 10 4*   HEMATOCRIT % 34 2*   PLATELETS Thousands/uL 439*   NEUTROS PCT % 87*   LYMPHS PCT % 6*   MONOS PCT % 6   EOS PCT % 0     Results from last 7 days   Lab Units 10/06/22  0601   SODIUM mmol/L 136   POTASSIUM mmol/L 3 8   CHLORIDE mmol/L 103   CO2 mmol/L 30   BUN mg/dL 10   CREATININE mg/dL 0 78   ANION GAP mmol/L 3*   CALCIUM mg/dL 8 3   ALBUMIN g/dL 2 9*   TOTAL BILIRUBIN mg/dL 0 49   ALK PHOS U/L 54   ALT U/L 32   AST U/L 6   GLUCOSE RANDOM mg/dL 123     Results from last 7 days   Lab Units 09/30/22  0957   INR  1 03             Results from last 7 days   Lab Units 10/02/22  0620 10/01/22  0838 10/01/22  0536 09/30/22  1157   LACTIC ACID mmol/L 1 1 2 7* 2 7* 1 8   PROCALCITONIN ng/ml  --   --  <0 05  --        Lines/Drains:  Invasive Devices  Report    Peripheral Intravenous Line  Duration           Peripheral IV 10/05/22 Left;Ventral (anterior) Forearm <1 day              Imaging: Reviewed radiology reports from this admission including: CT enterography    Recent Cultures (last 7 days):   Results from last 7 days   Lab Units 09/30/22  1529   BLOOD CULTURE  No Growth After 5 Days  No Growth After 5 Days         Last 24 Hours Medication List:   Current Facility-Administered Medications   Medication Dose Route Frequency Provider Last Rate   • acetaminophen  650 mg Oral Q6H PRN Davon Jimenez MD     • calcium carbonate  500 mg Oral Daily PRN Rosibel Henson MD     • DULoxetine  60 mg Oral Daily Rosibel Henson MD     • enoxaparin  30 mg Subcutaneous Daily Davon Jimenez MD     • HYDROmorphone  0 5 mg Intravenous Q4H PRN Darlin Martinez DO     • methylPREDNISolone sodium succinate  20 mg Intravenous Q8H 47 Rita Edgar MD     • metroNIDAZOLE  500 mg Intravenous Leola Diaz  mg (10/06/22 1347)   • ondansetron  4 mg Intravenous Q8H PRN Davon Jimenez MD     • ondansetron  4 mg Oral Q6H PRN Hernesto Samuel MD     • pantoprazole  40 mg Intravenous Q12H 47 Rita Edgar MD     • saccharomyces boulardii  250 mg Oral BID Moreno Bunn MD     • sodium chloride  125 mL/hr Intravenous Continuous Davon Jimenez  mL/hr (10/06/22 0846)   • traMADol  50 mg Oral Q4H PRN Darlin Martinez DO          Today, Patient Was Seen By: Aidee Gay    **Please Note: This note may have been constructed using a voice recognition system  **

## 2022-10-06 NOTE — PROGRESS NOTES
[address]  Progress Note - China Vázquez 1995, 32 y o  male MRN: [id number]  Unit/Bed#: [address] [place] [address] Encounter: [id number]  Primary Care Provider: Abiel Moreno DO   Date and time admitted to hospital: 9/30/2022  9:31 AM    * Abdominal pain  Assessment & Plan  Patient presented to the ER with abdominal pain   · Continue 20 mg IV Solu-Medrol q 8 hours  · CT ab: Post colectomy  The neorectum is dilated with mural thickening  Distal ileum with wall thickening  There is a second bowel anastomosis slightly proximal to the spine  Dilated Preanastomotic bowel loop  · Recent obstruction series with decrease in bowel distension  · Continue Flagyl  · GI recommending starting biologics as outpatient as he was previously on biologic therapy for ankylosing spondylitis which was discontinued earlier this year  Since then patient has been admitted multiple times with abdominal pain  · CT enterography per GI for further evaluation as patient continues to report abdominal discomfort        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[address] )  Assessment & Plan  Status post pouchoscopy on 10/4 which was negative for stricture or inflammation  · Diet has been advanced to solids  · Antibiotics and steroids as listed    SBO (small bowel obstruction) (HCC)-resolved as of 10/5/2022  Assessment & Plan  Stable  · Reports having BMs, nonbloody and positive for gas  · Abdominal x-ray as noted above  · Patient refused NG tube for decompression  · Now resolved        VTE Pharmacologic Prophylaxis:   Lovenox    Patient Centered Rounds: I performed bedside rounds with nursing staff today  Discussions with Specialists or Other Care Team Provider: yes - GI    Education and Discussions with Family / Patient: yes    Time Spent for Care: 30 minutes  More than 50% of total time spent on counseling and coordination of care as described above  Current Length of Stay: 5 day(s)  Current Patient Status: Inpatient   Certification Statement:  The patient will continue to require additional inpatient hospital stay due to Abdominal pain requiring CT enterography for further evaluation  Discharge Plan: Anticipate discharge tomorrow to home  Code Status: Level 1 - Full Code    Subjective:     Patient reports knot like pain in his stomach since diet was advanced    Objective:     Vitals:   Temp (24hrs), Av 7 °F (36 5 °C), Min:97 5 °F (36 4 °C), Max:98 1 °F (36 7 °C)    Temp:  [97 5 °F (36 4 °C)-98 1 °F (36 7 °C)] 98 1 °F (36 7 °C)  HR:  [69-88] 75  Resp:  [17-20] 17  BP: (121-154)/(85-97) 141/85  SpO2:  [93 %-99 %] 95 %  Body mass index is 26 88 kg/m²  Input and Output Summary (last 24 hours): Intake/Output Summary (Last 24 hours) at 10/5/2022 2004  Last data filed at 10/5/2022 0551  Gross per 24 hour   Intake --   Output 1000 ml   Net -1000 ml       Physical Exam:   Physical Exam  Vitals reviewed  Constitutional:       General: He is not in acute distress  Appearance: He is not ill-appearing  HENT:      Head: Normocephalic and atraumatic  Eyes:      General:         Right eye: No discharge  Left eye: No discharge  Extraocular Movements: Extraocular movements intact  Cardiovascular:      Rate and Rhythm: Normal rate and regular rhythm  Pulmonary:      Effort: Pulmonary effort is normal  No respiratory distress  Breath sounds: Normal breath sounds  No wheezing or rales  Abdominal:      General: Bowel sounds are normal  There is no distension  Palpations: Abdomen is soft  Tenderness: There is abdominal tenderness (Mild, generalized)  There is no guarding or rebound  Neurological:      Mental Status: He is alert and oriented to person, place, and time           Additional Data:     Labs:  Results from last 7 days   Lab Units 10/05/22  0555   WBC Thousand/uL 13 99*   HEMOGLOBIN g/dL 10 6*   HEMATOCRIT % 34 5*   PLATELETS Thousands/uL 447*   NEUTROS PCT % 82*   LYMPHS PCT % 9*   MONOS PCT % 8   EOS PCT % 0 Results from last 7 days   Lab Units 10/05/22  0555   SODIUM mmol/L 137   POTASSIUM mmol/L 3 6   CHLORIDE mmol/L 102   CO2 mmol/L 28   BUN mg/dL 12   CREATININE mg/dL 0 87   ANION GAP mmol/L 7   CALCIUM mg/dL 8 1*   ALBUMIN g/dL 2 8*   TOTAL BILIRUBIN mg/dL 0 46   ALK PHOS U/L 54   ALT U/L 16   AST U/L 8   GLUCOSE RANDOM mg/dL 151*     Results from last 7 days   Lab Units 09/30/22  0957   INR  1 03             Results from last 7 days   Lab Units 10/02/22  0620 10/01/22  0838 10/01/22  0536 09/30/22  1157   LACTIC ACID mmol/L 1 1 2 7* 2 7* 1 8   PROCALCITONIN ng/ml  --   --  <0 05  --        Lines/Drains:  Invasive Devices  Report    Peripheral Intravenous Line  Duration           Peripheral IV 10/05/22 Left;Ventral (anterior) Forearm <1 day                      Imaging: No pertinent imaging reviewed  Recent Cultures (last 7 days):   Results from last 7 days   Lab Units 09/30/22  1529   BLOOD CULTURE  No Growth After 4 Days  No Growth After 4 Days         Last 24 Hours Medication List:   Current Facility-Administered Medications   Medication Dose Route Frequency Provider Last Rate   • acetaminophen  650 mg Oral Q6H PRN Fanny Bradford MD     • calcium carbonate  500 mg Oral Daily PRN Fanny Bradford MD     • DULoxetine  60 mg Oral Daily Fanny Bradford MD     • enoxaparin  30 mg Subcutaneous Daily Fanny Bradford MD     • HYDROmorphone  0 5 mg Intravenous Q3H PRN Fanny Bradford MD     • methylPREDNISolone sodium succinate  20 mg Intravenous Q8H 47 Rita Edgar MD     • metroNIDAZOLE  500 mg Intravenous Irasema Pham  mg (10/05/22 1412)   • ondansetron  4 mg Intravenous Q8H PRN Fanny Bradford MD     • ondansetron  4 mg Oral Q6H PRN Fanny Bradford MD     • oxyCODONE-acetaminophen  1 tablet Oral Q6H PRN Fanny Bradford MD     • pantoprazole  40 mg Intravenous Q12H 47 Rita Edgar MD     • saccharomyces boulardii  250 mg Oral BID Fanny Bradford MD     • sodium chloride  125 mL/hr Intravenous Continuous Davon MD Barbara 125 mL/hr (10/05/22 0411)        Today, Patient Was Seen By: Bakari Duran    **Please Note: This note may have been constructed using a voice recognition system  **

## 2022-10-06 NOTE — ASSESSMENT & PLAN NOTE
Status post pouchoscopy on 10/4 which was negative for stricture or inflammation  · Diet has been advanced to solids  · Antibiotics and steroids as listed

## 2022-10-06 NOTE — ASSESSMENT & PLAN NOTE
Status post pouchoscopy on 10/4 which was negative for stricture or inflammation  · Diet has been advanced to solids  · Flagyl and steroids as noted above

## 2022-10-06 NOTE — ASSESSMENT & PLAN NOTE
Patient presented to the ER with abdominal pain   · Continue 20 mg IV Solu-Medrol q 8 hours  · CT ab: Post colectomy  The neorectum is dilated with mural thickening  Distal ileum with wall thickening  There is a second bowel anastomosis slightly proximal to the spine  Dilated Preanastomotic bowel loop  · Recent obstruction series with decrease in bowel distension  · Continue Flagyl  · GI recommending starting biologics as outpatient as he was previously on biologic therapy for ankylosing spondylitis which was discontinued earlier this year    Since then patient has been admitted multiple times with abdominal pain  · CT enterography per GI for further evaluation as patient continues to report abdominal discomfort

## 2022-10-06 NOTE — ASSESSMENT & PLAN NOTE
Stable  · Reports having BMs, nonbloody and positive for gas  · Abdominal x-ray as noted above  · Patient refused NG tube for decompression  · Now resolved

## 2022-10-06 NOTE — ASSESSMENT & PLAN NOTE
Stable  · Reports having BMs, nonbloody and positive for gas  · Abdominal x-ray as noted above  · Patient refused NG tube for decompression  · Resolved

## 2022-10-06 NOTE — ASSESSMENT & PLAN NOTE
Patient presented to the ER with abdominal pain   · Continue 20 mg IV Solu-Medrol q 8 hours  · CT ab: Post colectomy  The neorectum is dilated with mural thickening  Distal ileum with wall thickening  There is a second bowel anastomosis slightly proximal to the spine  Dilated Preanastomotic bowel loop  · Recent obstruction series with decrease in bowel distension  · Continue Flagyl  · GI recommending starting biologics as outpatient as he was previously on biologic therapy for ankylosing spondylitis which was discontinued earlier this year  Since then patient has been admitted multiple times with abdominal pain  · CT enterography showed active inflammatory small bowel disease involving long segment of preanastomotic small bowel and neorectum  · Per GI plan for prednisone taper starting at 40 mg and decrease by 5 mg every week  · Discussed with patient to follow up with IBD specialist after discharge  · Add tramadol p r n   For pain control and change Dilaudid for breakthrough pain as patient states that Percocet upsets his stomach but is able to tolerate tramadol

## 2022-10-07 ENCOUNTER — TELEPHONE (OUTPATIENT)
Dept: GASTROENTEROLOGY | Facility: CLINIC | Age: 27
End: 2022-10-07

## 2022-10-07 VITALS
DIASTOLIC BLOOD PRESSURE: 91 MMHG | HEART RATE: 73 BPM | TEMPERATURE: 98 F | RESPIRATION RATE: 20 BRPM | BODY MASS INDEX: 26.96 KG/M2 | OXYGEN SATURATION: 93 % | WEIGHT: 182 LBS | SYSTOLIC BLOOD PRESSURE: 143 MMHG | HEIGHT: 69 IN

## 2022-10-07 LAB
ALBUMIN SERPL BCP-MCNC: 2.8 G/DL (ref 3.5–5)
ALP SERPL-CCNC: 50 U/L (ref 46–116)
ALT SERPL W P-5'-P-CCNC: 23 U/L (ref 12–78)
ANION GAP SERPL CALCULATED.3IONS-SCNC: 5 MMOL/L (ref 4–13)
AST SERPL W P-5'-P-CCNC: 10 U/L (ref 5–45)
BASOPHILS # BLD AUTO: 0.02 THOUSANDS/ΜL (ref 0–0.1)
BASOPHILS NFR BLD AUTO: 0 % (ref 0–1)
BILIRUB SERPL-MCNC: 0.51 MG/DL (ref 0.2–1)
BUN SERPL-MCNC: 11 MG/DL (ref 5–25)
CALCIUM ALBUM COR SERPL-MCNC: 9.4 MG/DL (ref 8.3–10.1)
CALCIUM SERPL-MCNC: 8.4 MG/DL (ref 8.3–10.1)
CHLORIDE SERPL-SCNC: 101 MMOL/L (ref 96–108)
CO2 SERPL-SCNC: 29 MMOL/L (ref 21–32)
CREAT SERPL-MCNC: 0.83 MG/DL (ref 0.6–1.3)
EOSINOPHIL # BLD AUTO: 0 THOUSAND/ΜL (ref 0–0.61)
EOSINOPHIL NFR BLD AUTO: 0 % (ref 0–6)
ERYTHROCYTE [DISTWIDTH] IN BLOOD BY AUTOMATED COUNT: 16.5 % (ref 11.6–15.1)
GFR SERPL CREATININE-BSD FRML MDRD: 118 ML/MIN/1.73SQ M
GLUCOSE SERPL-MCNC: 127 MG/DL (ref 65–140)
HCT VFR BLD AUTO: 34.4 % (ref 36.5–49.3)
HGB BLD-MCNC: 10.5 G/DL (ref 12–17)
IMM GRANULOCYTES # BLD AUTO: 0.16 THOUSAND/UL (ref 0–0.2)
IMM GRANULOCYTES NFR BLD AUTO: 1 % (ref 0–2)
LYMPHOCYTES # BLD AUTO: 0.98 THOUSANDS/ΜL (ref 0.6–4.47)
LYMPHOCYTES NFR BLD AUTO: 5 % (ref 14–44)
MCH RBC QN AUTO: 19.2 PG (ref 26.8–34.3)
MCHC RBC AUTO-ENTMCNC: 30.5 G/DL (ref 31.4–37.4)
MCV RBC AUTO: 63 FL (ref 82–98)
MONOCYTES # BLD AUTO: 0.97 THOUSAND/ΜL (ref 0.17–1.22)
MONOCYTES NFR BLD AUTO: 5 % (ref 4–12)
NEUTROPHILS # BLD AUTO: 17.15 THOUSANDS/ΜL (ref 1.85–7.62)
NEUTS SEG NFR BLD AUTO: 89 % (ref 43–75)
NRBC BLD AUTO-RTO: 0 /100 WBCS
PLATELET # BLD AUTO: 467 THOUSANDS/UL (ref 149–390)
PMV BLD AUTO: 9.3 FL (ref 8.9–12.7)
POTASSIUM SERPL-SCNC: 4 MMOL/L (ref 3.5–5.3)
PROT SERPL-MCNC: 5.6 G/DL (ref 6.4–8.4)
RBC # BLD AUTO: 5.48 MILLION/UL (ref 3.88–5.62)
SODIUM SERPL-SCNC: 135 MMOL/L (ref 135–147)
WBC # BLD AUTO: 19.28 THOUSAND/UL (ref 4.31–10.16)

## 2022-10-07 PROCEDURE — C9113 INJ PANTOPRAZOLE SODIUM, VIA: HCPCS | Performed by: INTERNAL MEDICINE

## 2022-10-07 PROCEDURE — 80053 COMPREHEN METABOLIC PANEL: CPT | Performed by: INTERNAL MEDICINE

## 2022-10-07 PROCEDURE — 99239 HOSP IP/OBS DSCHRG MGMT >30: CPT | Performed by: FAMILY MEDICINE

## 2022-10-07 PROCEDURE — 85025 COMPLETE CBC W/AUTO DIFF WBC: CPT | Performed by: INTERNAL MEDICINE

## 2022-10-07 RX ORDER — TRAMADOL HYDROCHLORIDE 50 MG/1
50 TABLET ORAL EVERY 6 HOURS PRN
Qty: 10 TABLET | Refills: 0 | Status: SHIPPED | OUTPATIENT
Start: 2022-10-07 | End: 2022-10-10

## 2022-10-07 RX ORDER — PREDNISONE 10 MG/1
TABLET ORAL
Refills: 0
Start: 2022-10-07 | End: 2022-12-02

## 2022-10-07 RX ADMIN — SODIUM CHLORIDE 75 ML/HR: 0.9 INJECTION, SOLUTION INTRAVENOUS at 06:35

## 2022-10-07 RX ADMIN — HYDROMORPHONE HYDROCHLORIDE 0.5 MG: 1 INJECTION, SOLUTION INTRAMUSCULAR; INTRAVENOUS; SUBCUTANEOUS at 06:26

## 2022-10-07 RX ADMIN — HYDROMORPHONE HYDROCHLORIDE 0.5 MG: 1 INJECTION, SOLUTION INTRAMUSCULAR; INTRAVENOUS; SUBCUTANEOUS at 02:26

## 2022-10-07 RX ADMIN — METHYLPREDNISOLONE SODIUM SUCCINATE 20 MG: 40 INJECTION, POWDER, FOR SOLUTION INTRAMUSCULAR; INTRAVENOUS at 06:22

## 2022-10-07 RX ADMIN — HYDROMORPHONE HYDROCHLORIDE 0.5 MG: 1 INJECTION, SOLUTION INTRAMUSCULAR; INTRAVENOUS; SUBCUTANEOUS at 10:45

## 2022-10-07 RX ADMIN — ENOXAPARIN SODIUM 30 MG: 30 INJECTION SUBCUTANEOUS at 08:49

## 2022-10-07 RX ADMIN — DULOXETINE HYDROCHLORIDE 60 MG: 60 CAPSULE, DELAYED RELEASE ORAL at 08:49

## 2022-10-07 RX ADMIN — PANTOPRAZOLE SODIUM 40 MG: 40 INJECTION, POWDER, FOR SOLUTION INTRAVENOUS at 08:49

## 2022-10-07 RX ADMIN — HYDROMORPHONE HYDROCHLORIDE 0.5 MG: 1 INJECTION, SOLUTION INTRAMUSCULAR; INTRAVENOUS; SUBCUTANEOUS at 14:53

## 2022-10-07 RX ADMIN — METRONIDAZOLE 500 MG: 500 INJECTION, SOLUTION INTRAVENOUS at 06:22

## 2022-10-07 RX ADMIN — Medication 250 MG: at 08:49

## 2022-10-07 RX ADMIN — METHYLPREDNISOLONE SODIUM SUCCINATE 20 MG: 40 INJECTION, POWDER, FOR SOLUTION INTRAMUSCULAR; INTRAVENOUS at 13:02

## 2022-10-07 RX ADMIN — METRONIDAZOLE 500 MG: 500 INJECTION, SOLUTION INTRAVENOUS at 13:02

## 2022-10-07 NOTE — PLAN OF CARE
Problem: METABOLIC, FLUID AND ELECTROLYTES - ADULT  Goal: Electrolytes maintained within normal limits  Description: INTERVENTIONS:  - Monitor labs and assess patient for signs and symptoms of electrolyte imbalances  - Administer electrolyte replacement as ordered  - Monitor response to electrolyte replacements, including repeat lab results as appropriate  - Instruct patient on fluid and nutrition as appropriate  Outcome: Progressing  Goal: Fluid balance maintained  Description: INTERVENTIONS:  - Monitor labs   - Monitor I/O and WT  - Instruct patient on fluid and nutrition as appropriate  - Assess for signs & symptoms of volume excess or deficit  Outcome: Progressing     Problem: PAIN - ADULT  Goal: Verbalizes/displays adequate comfort level or baseline comfort level  Description: Interventions:  - Encourage patient to monitor pain and request assistance  - Assess pain using appropriate pain scale  - Administer analgesics based on type and severity of pain and evaluate response  - Implement non-pharmacological measures as appropriate and evaluate response  - Consider cultural and social influences on pain and pain management  - Notify physician/advanced practitioner if interventions unsuccessful or patient reports new pain  Outcome: Progressing     Problem: GASTROINTESTINAL - ADULT  Goal: Minimal or absence of nausea and/or vomiting  Description: INTERVENTIONS:  - Administer IV fluids if ordered to ensure adequate hydration  - Maintain NPO status until nausea and vomiting are resolved  - Nasogastric tube if ordered  - Administer ordered antiemetic medications as needed  - Provide nonpharmacologic comfort measures as appropriate  - Advance diet as tolerated, if ordered  - Consider nutrition services referral to assist patient with adequate nutrition and appropriate food choices  Outcome: Progressing  Goal: Maintains or returns to baseline bowel function  Description: INTERVENTIONS:  - Assess bowel function  - Encourage oral fluids to ensure adequate hydration  - Administer IV fluids if ordered to ensure adequate hydration  - Administer ordered medications as needed  - Encourage mobilization and activity  - Consider nutritional services referral to assist patient with adequate nutrition and appropriate food choices  Outcome: Progressing  Goal: Maintains adequate nutritional intake  Description: INTERVENTIONS:  - Monitor percentage of each meal consumed  - Identify factors contributing to decreased intake, treat as appropriate  - Assist with meals as needed  - Monitor I&O, weight, and lab values if indicated  - Obtain nutrition services referral as needed  Outcome: Progressing     Problem: Nutrition/Hydration-ADULT  Goal: Nutrient/Hydration intake appropriate for improving, restoring or maintaining nutritional needs  Description: Monitor and assess patient's nutrition/hydration status for malnutrition  Collaborate with interdisciplinary team and initiate plan and interventions as ordered  Monitor patient's weight and dietary intake as ordered or per policy  Utilize nutrition screening tool and intervene as necessary  Determine patient's food preferences and provide high-protein, high-caloric foods as appropriate       INTERVENTIONS:  - Monitor oral intake, urinary output, labs, and treatment plans  - Assess nutrition and hydration status and recommend course of action  - Evaluate amount of meals eaten  - Assist patient with eating if necessary   - Allow adequate time for meals  - Recommend/ encourage appropriate diets, oral nutritional supplements, and vitamin/mineral supplements  - Order, calculate, and assess calorie counts as needed  - Recommend, monitor, and adjust tube feedings and TPN/PPN based on assessed needs  - Assess need for intravenous fluids  - Provide specific nutrition/hydration education as appropriate  - Include patient/family/caregiver in decisions related to nutrition  Outcome: Progressing     Problem: Potential for Falls  Goal: Patient will remain free of falls  Description: INTERVENTIONS:  - Educate patient/family on patient safety including physical limitations  - Instruct patient to call for assistance with activity   - Consult OT/PT to assist with strengthening/mobility   - Keep Call bell within reach  - Keep bed low and locked with side rails adjusted as appropriate  - Keep care items and personal belongings within reach  - Initiate and maintain comfort rounds  - Make Fall Risk Sign visible to staff  - Offer Toileting every 2 Hours, in advance of need  - Initiate/Maintain bed alarm  - Obtain necessary fall risk management equipment:   - Apply yellow socks and bracelet for high fall risk patients  - Consider moving patient to room near nurses station  Outcome: Progressing

## 2022-10-07 NOTE — DISCHARGE INSTRUCTIONS
Continue low residue, lactose restricted diet & avoid NSAIDs such as Motrin, Aleve, naproxen etc    Follow-up with [facility] IBD specialist Dr Jaime Oconnell or Dr Darell Kim within 2 weeks of discharge  Please call the number that is provided and set up a follow-up appointment    Start with prednisone 40 mg daily and cut down by 5 mg weekly    If you have worsening symptoms when tapering down prednisone, contact GI office Right away    Do not drive or operate heavy machinery while taking tramadol as it can be sedating

## 2022-10-07 NOTE — UTILIZATION REVIEW
Continued Stay Review    Date:    10/7/22                          Current Patient Class:    Inpatient  Current Level of Care:    Med surg    HPI:27 y o  male initially admitted on   9/30       10/4   Flex sigmoid    shown ileal pouch and anal anastomosis appeared normal with no stricture angulation or inflammation seen with normal-appearing mucosa    Assessment/Plan:   10/7    IBD  With  SBO  Remains on  IV steroids  Needs prednisone taper on discharge  Remains on low residue lactulose restrict diet  Still with postprandial diarrhea and abdominal pain  Denies blood  Continue pain control as needed  Remains on  IVF/CAPO/IV  PPI  Vital Signs:    97 6-66-18      141/86        sats  94 %    Pertinent Labs/Diagnostic Results:   CT    SB enterography  ( 10/5)   Inflammation: Active inflammatory small bowel disease without luminal narrowing involving the neorectum and a long segment of preanastomotic small bowel along the right abdomen  *  Stricture: None  *  Penetrating complication: None  *  Perianal disease: None  *  Other complications: None     Results from last 7 days   Lab Units 10/03/22  1759   SARS-COV-2  Negative     Lab Units 10/07/22  0622   WBC Thousand/uL 19 28*   HEMOGLOBIN g/dL 10 5*   HEMATOCRIT % 34 4*   PLATELETS Thousands/uL 467*   NEUTROS ABS Thousands/µL 17 15*         Lab Units 10/07/22  0622   SODIUM mmol/L 135   POTASSIUM mmol/L 4 0   CHLORIDE mmol/L 101   CO2 mmol/L 29   ANION GAP mmol/L 5   BUN mg/dL 11   CREATININE mg/dL 0 83   EGFR ml/min/1 73sq m 118   CALCIUM mg/dL 8 4     Lab Units 10/07/22  0622   AST U/L 10   ALT U/L 23   ALK PHOS U/L 50   TOTAL PROTEIN g/dL 5 6*   ALBUMIN g/dL 2 8*   TOTAL BILIRUBIN mg/dL 0 51         Lab Units 10/07/22  0622   GLUCOSE RANDOM mg/dL 127               Medications:   Scheduled Medications:  DULoxetine, 60 mg, Oral, Daily  enoxaparin, 30 mg, Subcutaneous, Daily  methylPREDNISolone sodium succinate, 20 mg, Intravenous, Q8H KAITLIN  metroNIDAZOLE, 500 mg, Intravenous, Q8H  pantoprazole, 40 mg, Intravenous, Q12H KAITLIN  saccharomyces boulardii, 250 mg, Oral, BID      Continuous IV Infusions:  sodium chloride, 75 mL/hr, Intravenous, Continuous      PRN Meds:  acetaminophen, 650 mg, Oral, Q6H PRN  calcium carbonate, 500 mg, Oral, Daily PRN  HYDROmorphone, 0 5 mg, Intravenous, Q4H PRN  ( x4  10/7 thus far)  ondansetron, 4 mg, Intravenous, Q8H PRN  ondansetron, 4 mg, Oral, Q6H PRN  traMADol, 50 mg, Oral, Q4H PRN        Discharge Plan:    D    Network Utilization Review Department  ATTENTION: Please call with any questions or concerns to [phone] and carefully listen to the prompts so that you are directed to the right person  All voicemails are confidential   Cami Mahajan all requests for admission clinical reviews, approved or denied determinations and any other requests to dedicated fax number below belonging to the campus where the patient is receiving treatment   List of dedicated fax numbers for the Facilities:  [address] DENIALS (Administrative/Medical Necessity) [phone]   [address] (Maternity/NICU/Pediatrics) [phone]   910 Ashtyn Perez [phone]   [facility] [phone]   1301 [facility] [place] [address] [address] [place] [phone]   [address] [facility] [place] [facility] 134 815 [facility] [phone]

## 2022-10-07 NOTE — DISCHARGE SUMMARY
Julian 43  Discharge- Nikia Arriola 1995, 32 y o  male MRN: [id number]  Unit/Bed#: [address] [place] [address] Encounter: [id number]  Primary Care Provider: Debra Peter DO   Date and time admitted to hospital: 9/30/2022  9:31 AM    * Abdominal pain  Assessment & Plan  Patient presented to the ER with abdominal pain   · Was on 20 mg IV Solu-Medrol q 8 hours while here  · CT ab: Post colectomy  Dilated neorectum with mural thickening  Distal ileum with wall thickening  a second bowel anastomosis slightly proximal to the spine was noted  Dilated Preanastomotic bowel loop  · Received Flagyl here which she can continue on discharge  · GI recommending starting biologics as outpatient as he was previously on biologic therapy for ankylosing spondylitis which was discontinued earlier this year    Since then patient has been admitted multiple times with abdominal pain  · CT enterography showed active inflammatory small bowel disease involving long segment of preanastomotic small bowel and neorectum  · Per GI plan for prednisone taper starting at 40 mg and decrease by 5 mg every week  · Discussed with patient to follow up with IBD specialist after discharge  · Tramadol p r n  for pain control on discharge        Ileal pouchitis [facility])  Assessment & Plan  Status post pouchoscopy on 10/4 which was negative for stricture or inflammation  · Diet was slowly advanced to solids  · Flagyl and steroids as noted above    SBO (small bowel obstruction) (HCC)-resolved as of 10/5/2022  Assessment & Plan  Stable  · Reports having BMs, nonbloody and positive for gas  · Abdominal x-ray as noted above  · Patient refused NG tube for decompression  · Resolved    Medical Problems             Resolved Problems  Date Reviewed: 10/7/2022          Resolved    Sepsis without acute organ dysfunction ([facility] [address] ) 10/3/2022     Resolved by  Katie Nguyen DO    SBO (small bowel obstruction) ([facility] [address] ) 10/5/2022     Resolved by  [facility] DO Juan    Elevated lactic acid level 10/2/2022     Resolved by  Nikki Hill MD              Discharging Physician / Practitioner: Mayela Cordero  PCP: Saurabh Leung DO  Admission Date:   Admission Orders (From admission, onward)     Ordered        09/30/22 1209  [facility]  Once                      Discharge Date: 10/07/22    Consultations During Hospital Stay:  · GI    Procedures Performed:   · Pouchoscopy    Incidental Findings:   · none    Test Results Pending at Discharge (will require follow up):   ·  none     Outpatient Tests Requested:  · none    Complications:  none    Reason for Admission:  Abdominal pain    Hospital Course:   Jaxson Hubbard is a 32 y o  male patient who originally presented to the hospital on 9/30/2022 due to Abdominal pain  Reported nausea but no vomiting  Patient felt like he was obstructed  Patient was noted to findings suggestive of SBO on imaging but refused NG tube for decompression  He was seen by GI while here for his abdominal pain and treated with IV Flagyl and IV steroids  Patient underwent pot chest could be  Diet was slowly advanced  Patient cleared by GI prior to discharge  Discharge plan discussed in details with patient    Please see above list of diagnoses and related plan for additional information  Condition at Discharge: stable    Discharge Day Visit / Exam:   Subjective:  Abdominal pain has improved  States pain is under control with tramadol use  States he has enough prednisone and Flagyl at home and does not need a new prescription    Vitals: Blood Pressure: 141/86 (10/07/22 0713)  Pulse: 66 (10/07/22 0713)  Temperature: 97 6 °F (36 4 °C) (10/07/22 0713)  Temp Source: Oral (10/06/22 1922)  Respirations: 18 (10/06/22 2342)  Height: 5' 9" (175 3 cm) (09/30/22 0935)  Weight - Scale: 82 6 kg (182 lb) (09/30/22 0935)  SpO2: 94 % (10/07/22 0713)  Exam:   Physical Exam  Vitals reviewed     Constitutional:       General: He is not in acute distress  Appearance: He is not ill-appearing  HENT:      Head: Normocephalic and atraumatic  Eyes:      General:         Right eye: No discharge  Left eye: No discharge  Cardiovascular:      Rate and Rhythm: Normal rate and regular rhythm  Pulmonary:      Effort: Pulmonary effort is normal  No respiratory distress  Breath sounds: Normal breath sounds  No wheezing or rales  Abdominal:      General: Bowel sounds are normal  There is no distension  Palpations: Abdomen is soft  Tenderness: There is abdominal tenderness (Very minimal lower abdomen)  There is no guarding  Neurological:      Mental Status: He is alert and oriented to person, place, and time  Discharge instructions/Information to patient and family:   See after visit summary for information provided to patient and family  Provisions for Follow-Up Care:  See after visit summary for information related to follow-up care and any pertinent home health orders  Disposition:   Home    Planned Readmission: no     Discharge Statement:  I spent > 30 minutes discharging the patient  This time was spent on the day of discharge  I had direct contact with the patient on the day of discharge  Greater than 50% of the total time was spent examining patient, answering all patient questions, arranging and discussing plan of care with patient as well as directly providing post-discharge instructions  Additional time then spent on discharge activities  Discharge Medications:  See after visit summary for reconciled discharge medications provided to patient and/or family        **Please Note: This note may have been constructed using a voice recognition system**

## 2022-10-08 NOTE — ASSESSMENT & PLAN NOTE
Patient presented to the ER with abdominal pain   · Was on 20 mg IV Solu-Medrol q 8 hours while here  · CT ab: Post colectomy  Dilated neorectum with mural thickening  Distal ileum with wall thickening  a second bowel anastomosis slightly proximal to the spine was noted  Dilated Preanastomotic bowel loop  · Received Flagyl here which she can continue on discharge  · GI recommending starting biologics as outpatient as he was previously on biologic therapy for ankylosing spondylitis which was discontinued earlier this year    Since then patient has been admitted multiple times with abdominal pain  · CT enterography showed active inflammatory small bowel disease involving long segment of preanastomotic small bowel and neorectum  · Per GI plan for prednisone taper starting at 40 mg and decrease by 5 mg every week  · Discussed with patient to follow up with IBD specialist after discharge  · Tramadol p r n  for pain control on discharge

## 2022-10-08 NOTE — ASSESSMENT & PLAN NOTE
Status post pouchoscopy on 10/4 which was negative for stricture or inflammation  · Diet was slowly advanced to solids  · Flagyl and steroids as noted above

## 2022-10-09 ENCOUNTER — HOSPITAL ENCOUNTER (OUTPATIENT)
Facility: HOSPITAL | Age: 27
Setting detail: OBSERVATION
Discharge: ANOTHER HOSPITAL | End: 2022-10-10
Attending: EMERGENCY MEDICINE | Admitting: FAMILY MEDICINE
Payer: COMMERCIAL

## 2022-10-09 ENCOUNTER — TELEPHONE (OUTPATIENT)
Dept: FAMILY MEDICINE CLINIC | Facility: CLINIC | Age: 27
End: 2022-10-09

## 2022-10-09 ENCOUNTER — APPOINTMENT (EMERGENCY)
Dept: RADIOLOGY | Facility: HOSPITAL | Age: 27
End: 2022-10-09
Payer: COMMERCIAL

## 2022-10-09 DIAGNOSIS — R10.84 GENERALIZED ABDOMINAL PAIN: Primary | ICD-10-CM

## 2022-10-09 LAB
ALBUMIN SERPL BCP-MCNC: 3.3 G/DL (ref 3.5–5)
ALP SERPL-CCNC: 63 U/L (ref 46–116)
ALT SERPL W P-5'-P-CCNC: 82 U/L (ref 12–78)
ANION GAP SERPL CALCULATED.3IONS-SCNC: 8 MMOL/L (ref 4–13)
AST SERPL W P-5'-P-CCNC: 34 U/L (ref 5–45)
BASOPHILS # BLD AUTO: 0.02 THOUSANDS/ΜL (ref 0–0.1)
BASOPHILS NFR BLD AUTO: 0 % (ref 0–1)
BILIRUB SERPL-MCNC: 0.89 MG/DL (ref 0.2–1)
BUN SERPL-MCNC: 19 MG/DL (ref 5–25)
CALCIUM ALBUM COR SERPL-MCNC: 9 MG/DL (ref 8.3–10.1)
CALCIUM SERPL-MCNC: 8.4 MG/DL (ref 8.3–10.1)
CHLORIDE SERPL-SCNC: 101 MMOL/L (ref 96–108)
CO2 SERPL-SCNC: 30 MMOL/L (ref 21–32)
CREAT SERPL-MCNC: 1.09 MG/DL (ref 0.6–1.3)
EOSINOPHIL # BLD AUTO: 0.2 THOUSAND/ΜL (ref 0–0.61)
EOSINOPHIL NFR BLD AUTO: 1 % (ref 0–6)
ERYTHROCYTE [DISTWIDTH] IN BLOOD BY AUTOMATED COUNT: 18.3 % (ref 11.6–15.1)
GFR SERPL CREATININE-BSD FRML MDRD: 91 ML/MIN/1.73SQ M
GLUCOSE SERPL-MCNC: 155 MG/DL (ref 65–140)
HCT VFR BLD AUTO: 40.2 % (ref 36.5–49.3)
HGB BLD-MCNC: 12.2 G/DL (ref 12–17)
IMM GRANULOCYTES # BLD AUTO: 0.12 THOUSAND/UL (ref 0–0.2)
IMM GRANULOCYTES NFR BLD AUTO: 1 % (ref 0–2)
LIPASE SERPL-CCNC: 132 U/L (ref 73–393)
LYMPHOCYTES # BLD AUTO: 1.73 THOUSANDS/ΜL (ref 0.6–4.47)
LYMPHOCYTES NFR BLD AUTO: 10 % (ref 14–44)
MCH RBC QN AUTO: 18.9 PG (ref 26.8–34.3)
MCHC RBC AUTO-ENTMCNC: 30.3 G/DL (ref 31.4–37.4)
MCV RBC AUTO: 62 FL (ref 82–98)
MONOCYTES # BLD AUTO: 1.5 THOUSAND/ΜL (ref 0.17–1.22)
MONOCYTES NFR BLD AUTO: 8 % (ref 4–12)
NEUTROPHILS # BLD AUTO: 14.54 THOUSANDS/ΜL (ref 1.85–7.62)
NEUTS SEG NFR BLD AUTO: 80 % (ref 43–75)
NRBC BLD AUTO-RTO: 0 /100 WBCS
PLATELET # BLD AUTO: 452 THOUSANDS/UL (ref 149–390)
PMV BLD AUTO: 9.1 FL (ref 8.9–12.7)
POTASSIUM SERPL-SCNC: 3.1 MMOL/L (ref 3.5–5.3)
PROT SERPL-MCNC: 6.4 G/DL (ref 6.4–8.4)
RBC # BLD AUTO: 6.45 MILLION/UL (ref 3.88–5.62)
SARS-COV-2 RNA RESP QL NAA+PROBE: NEGATIVE
SODIUM SERPL-SCNC: 139 MMOL/L (ref 135–147)
WBC # BLD AUTO: 18.11 THOUSAND/UL (ref 4.31–10.16)

## 2022-10-09 PROCEDURE — 99285 EMERGENCY DEPT VISIT HI MDM: CPT

## 2022-10-09 PROCEDURE — 36415 COLL VENOUS BLD VENIPUNCTURE: CPT | Performed by: EMERGENCY MEDICINE

## 2022-10-09 PROCEDURE — U0005 INFEC AGEN DETEC AMPLI PROBE: HCPCS | Performed by: EMERGENCY MEDICINE

## 2022-10-09 PROCEDURE — 99285 EMERGENCY DEPT VISIT HI MDM: CPT | Performed by: EMERGENCY MEDICINE

## 2022-10-09 PROCEDURE — 96361 HYDRATE IV INFUSION ADD-ON: CPT

## 2022-10-09 PROCEDURE — G1004 CDSM NDSC: HCPCS

## 2022-10-09 PROCEDURE — 99220 PR INITIAL OBSERVATION CARE/DAY 70 MINUTES: CPT

## 2022-10-09 PROCEDURE — 80053 COMPREHEN METABOLIC PANEL: CPT | Performed by: EMERGENCY MEDICINE

## 2022-10-09 PROCEDURE — 96376 TX/PRO/DX INJ SAME DRUG ADON: CPT

## 2022-10-09 PROCEDURE — 96374 THER/PROPH/DIAG INJ IV PUSH: CPT

## 2022-10-09 PROCEDURE — 83690 ASSAY OF LIPASE: CPT | Performed by: EMERGENCY MEDICINE

## 2022-10-09 PROCEDURE — 74177 CT ABD & PELVIS W/CONTRAST: CPT

## 2022-10-09 PROCEDURE — U0003 INFECTIOUS AGENT DETECTION BY NUCLEIC ACID (DNA OR RNA); SEVERE ACUTE RESPIRATORY SYNDROME CORONAVIRUS 2 (SARS-COV-2) (CORONAVIRUS DISEASE [COVID-19]), AMPLIFIED PROBE TECHNIQUE, MAKING USE OF HIGH THROUGHPUT TECHNOLOGIES AS DESCRIBED BY CMS-2020-01-R: HCPCS | Performed by: EMERGENCY MEDICINE

## 2022-10-09 PROCEDURE — 85025 COMPLETE CBC W/AUTO DIFF WBC: CPT | Performed by: EMERGENCY MEDICINE

## 2022-10-09 RX ORDER — ENOXAPARIN SODIUM 100 MG/ML
40 INJECTION SUBCUTANEOUS DAILY
Status: DISCONTINUED | OUTPATIENT
Start: 2022-10-10 | End: 2022-10-11 | Stop reason: HOSPADM

## 2022-10-09 RX ORDER — HYDROMORPHONE HCL/PF 1 MG/ML
1 SYRINGE (ML) INJECTION ONCE
Status: COMPLETED | OUTPATIENT
Start: 2022-10-09 | End: 2022-10-09

## 2022-10-09 RX ORDER — HYDROMORPHONE HCL/PF 1 MG/ML
0.5 SYRINGE (ML) INJECTION
Status: DISCONTINUED | OUTPATIENT
Start: 2022-10-09 | End: 2022-10-11 | Stop reason: HOSPADM

## 2022-10-09 RX ORDER — METRONIDAZOLE 500 MG/1
500 TABLET ORAL EVERY 8 HOURS SCHEDULED
Status: DISCONTINUED | OUTPATIENT
Start: 2022-10-10 | End: 2022-10-11 | Stop reason: HOSPADM

## 2022-10-09 RX ORDER — SACCHAROMYCES BOULARDII 250 MG
250 CAPSULE ORAL 2 TIMES DAILY
Status: DISCONTINUED | OUTPATIENT
Start: 2022-10-10 | End: 2022-10-11 | Stop reason: HOSPADM

## 2022-10-09 RX ORDER — PANTOPRAZOLE SODIUM 40 MG/1
40 TABLET, DELAYED RELEASE ORAL
Status: DISCONTINUED | OUTPATIENT
Start: 2022-10-10 | End: 2022-10-11 | Stop reason: HOSPADM

## 2022-10-09 RX ORDER — HYDROMORPHONE HCL/PF 1 MG/ML
0.5 SYRINGE (ML) INJECTION ONCE
Status: COMPLETED | OUTPATIENT
Start: 2022-10-09 | End: 2022-10-09

## 2022-10-09 RX ORDER — ONDANSETRON 2 MG/ML
4 INJECTION INTRAMUSCULAR; INTRAVENOUS EVERY 6 HOURS PRN
Status: DISCONTINUED | OUTPATIENT
Start: 2022-10-09 | End: 2022-10-11 | Stop reason: HOSPADM

## 2022-10-09 RX ORDER — SODIUM CHLORIDE 9 MG/ML
125 INJECTION, SOLUTION INTRAVENOUS CONTINUOUS
Status: DISPENSED | OUTPATIENT
Start: 2022-10-10 | End: 2022-10-10

## 2022-10-09 RX ORDER — ACETAMINOPHEN 325 MG/1
650 TABLET ORAL EVERY 6 HOURS PRN
Status: DISCONTINUED | OUTPATIENT
Start: 2022-10-09 | End: 2022-10-11 | Stop reason: HOSPADM

## 2022-10-09 RX ORDER — DULOXETIN HYDROCHLORIDE 30 MG/1
60 CAPSULE, DELAYED RELEASE ORAL DAILY
Status: DISCONTINUED | OUTPATIENT
Start: 2022-10-10 | End: 2022-10-11 | Stop reason: HOSPADM

## 2022-10-09 RX ORDER — OXYCODONE HYDROCHLORIDE AND ACETAMINOPHEN 5; 325 MG/1; MG/1
1 TABLET ORAL EVERY 4 HOURS PRN
Status: DISCONTINUED | OUTPATIENT
Start: 2022-10-09 | End: 2022-10-11 | Stop reason: HOSPADM

## 2022-10-09 RX ORDER — POTASSIUM CHLORIDE 20 MEQ/1
40 TABLET, EXTENDED RELEASE ORAL ONCE
Status: COMPLETED | OUTPATIENT
Start: 2022-10-09 | End: 2022-10-09

## 2022-10-09 RX ORDER — HYDROMORPHONE HYDROCHLORIDE 2 MG/1
2 TABLET ORAL ONCE
Status: COMPLETED | OUTPATIENT
Start: 2022-10-09 | End: 2022-10-09

## 2022-10-09 RX ORDER — PREDNISONE 20 MG/1
40 TABLET ORAL DAILY
Status: DISCONTINUED | OUTPATIENT
Start: 2022-10-10 | End: 2022-10-11 | Stop reason: HOSPADM

## 2022-10-09 RX ADMIN — HYDROMORPHONE HYDROCHLORIDE 1 MG: 1 INJECTION, SOLUTION INTRAMUSCULAR; INTRAVENOUS; SUBCUTANEOUS at 16:14

## 2022-10-09 RX ADMIN — IOHEXOL 100 ML: 350 INJECTION, SOLUTION INTRAVENOUS at 17:36

## 2022-10-09 RX ADMIN — HYDROMORPHONE HYDROCHLORIDE 1 MG: 1 INJECTION, SOLUTION INTRAMUSCULAR; INTRAVENOUS; SUBCUTANEOUS at 19:32

## 2022-10-09 RX ADMIN — HYDROMORPHONE HYDROCHLORIDE 0.5 MG: 1 INJECTION, SOLUTION INTRAMUSCULAR; INTRAVENOUS; SUBCUTANEOUS at 22:27

## 2022-10-09 RX ADMIN — POTASSIUM CHLORIDE 40 MEQ: 1500 TABLET, EXTENDED RELEASE ORAL at 16:53

## 2022-10-09 RX ADMIN — HYDROMORPHONE HYDROCHLORIDE 2 MG: 2 TABLET ORAL at 17:59

## 2022-10-09 RX ADMIN — SODIUM CHLORIDE 1000 ML: 0.9 INJECTION, SOLUTION INTRAVENOUS at 16:14

## 2022-10-09 NOTE — ED PROVIDER NOTES
History  Chief Complaint   Patient presents with   • Dizziness     Patient C/O dizziness and nausea with abd pain since he woke up this am  Patient states he feels like everything around him is moving  • Abdominal Pain     C/O mid abd pain worse this am 8/10  Was just D/C from hospital on Friday for SBO       26-year-old male complaining of dizziness and some nausea with the gastric abdominal pain since he woke up this morning  Patient states thing is her moving his the way he describes it  Is been abdominal pain is consistent with some of the pain he has had in the past during his admissions  Patient states he was admitted for small-bowel obstruction that resolved was discharged home      History provided by:  Patient and parent   used: No        Prior to Admission Medications   Prescriptions Last Dose Informant Patient Reported? Taking? DULoxetine (CYMBALTA) 60 mg delayed release capsule   No No   Sig: Take 1 capsule (60 mg total) by mouth daily   metroNIDAZOLE (FLAGYL) 500 mg tablet   No No   Sig: Take 1 tablet (500 mg total) by mouth every 8 (eight) hours   ondansetron (ZOFRAN) 4 mg tablet   No No   Sig: Take 1 tablet (4 mg total) by mouth every 6 (six) hours   pantoprazole (PROTONIX) 40 mg tablet   No No   Sig: Take 1 tablet (40 mg total) by mouth 2 (two) times a day   predniSONE 10 mg tablet   No No   Sig: Take 4 tablets (40 mg total) by mouth daily for 7 days, THEN 3 5 tablets (35 mg total) daily for 7 days, THEN 3 tablets (30 mg total) daily for 7 days, THEN 2 5 tablets (25 mg total) daily for 7 days, THEN 2 tablets (20 mg total) daily for 7 days, THEN 1 5 tablets (15 mg total) daily for 7 days, THEN 1 tablet (10 mg total) daily for 7 days, THEN 0 5 tablets (5 mg total) daily for 7 days     saccharomyces boulardii (FLORASTOR) 250 mg capsule   No No   Sig: Take 1 capsule (250 mg total) by mouth 2 (two) times a day   traMADol (ULTRAM) 50 mg tablet   No No   Sig: Take 1 tablet (50 mg total) by mouth every 6 (six) hours as needed for moderate pain or severe pain for up to 10 doses      Facility-Administered Medications: None       Past Medical History:   Diagnosis Date   • Ankylosing spondylitis ([facility] )    • Anxiety    • Bowel obstruction (HCC)    • Clostridium difficile colitis 9/13/2018   • Colitis    • 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[facility] ) 9/13/2018   • Pancreatitis    • Ulcerative colitis ([place] [address] )        Past Surgical History:   Procedure Laterality Date   • COLECTOMY TOTAL      with ileal pouch and anastemosis   • IR PICC PLACEMENT SINGLE LUMEN  3/1/2022   • TOTAL COLECTOMY         History reviewed  No pertinent family history  I have reviewed and agree with the history as documented  E-Cigarette/Vaping   • E-Cigarette Use Never User      E-Cigarette/Vaping Substances   • Nicotine No    • THC No    • CBD No    • Flavoring No    • Other No    • Unknown No      Social History     Tobacco Use   • Smoking status: Never Smoker   • Smokeless tobacco: Never Used   Vaping Use   • Vaping Use: Never used   Substance Use Topics   • Alcohol use: Not Currently     Comment: pt states 5 a month/socially   • Drug use: No       Review of Systems   Constitutional: Negative for activity change, chills, diaphoresis and fever  HENT: Negative for congestion, ear pain, nosebleeds, sore throat, trouble swallowing and voice change  Eyes: Negative for pain, discharge and redness  Respiratory: Negative for apnea, cough, choking, shortness of breath, wheezing and stridor  Cardiovascular: Negative for chest pain and palpitations  Gastrointestinal: Positive for abdominal pain and nausea  Negative for abdominal distention, constipation, diarrhea and vomiting  Endocrine: Negative for polydipsia  Genitourinary: Negative for difficulty urinating, dysuria, flank pain, frequency, hematuria and urgency  Musculoskeletal: Negative for back pain, gait problem, joint swelling, myalgias, neck pain and neck stiffness     Skin: Negative for pallor and rash  Neurological: Negative for dizziness, tremors, syncope, speech difficulty, weakness, numbness and headaches  Hematological: Negative for adenopathy  Psychiatric/Behavioral: Negative for confusion, hallucinations, self-injury and suicidal ideas  The patient is not nervous/anxious  Physical Exam  Physical Exam  Vitals and nursing note reviewed  Constitutional:       General: He is not in acute distress  Appearance: He is well-developed  He is not diaphoretic  HENT:      Head: Normocephalic and atraumatic  Right Ear: External ear normal       Left Ear: External ear normal       Nose: Nose normal    Eyes:      Conjunctiva/sclera: Conjunctivae normal       Pupils: Pupils are equal, round, and reactive to light  Cardiovascular:      Rate and Rhythm: Normal rate and regular rhythm  Heart sounds: Normal heart sounds  Pulmonary:      Effort: Pulmonary effort is normal       Breath sounds: Normal breath sounds  Abdominal:      General: Bowel sounds are normal       Palpations: Abdomen is soft  Tenderness: There is generalized abdominal tenderness and tenderness in the epigastric area  Musculoskeletal:         General: Normal range of motion  Cervical back: Normal range of motion and neck supple  Skin:     General: Skin is warm and dry  Neurological:      Mental Status: He is alert and oriented to person, place, and time  Deep Tendon Reflexes: Reflexes are normal and symmetric           Vital Signs  ED Triage Vitals [10/09/22 1557]   Temperature Pulse Respirations Blood Pressure SpO2   98 2 °F (36 8 °C) (!) 112 22 109/83 99 %      Temp Source Heart Rate Source Patient Position - Orthostatic VS BP Location FiO2 (%)   Oral Monitor Lying Left arm --      Pain Score       8           Vitals:    10/09/22 1850 10/09/22 1905 10/09/22 1930 10/09/22 2000   BP:  126/87 127/84 127/74   Pulse: 105 (!) 121 (!) 115 101   Patient Position - Orthostatic VS: Sitting          Visual Acuity  Visual Acuity    Flowsheet Row Most Recent Value   L Pupil Size (mm) 3   R Pupil Size (mm) 3          ED Medications  Medications   sodium chloride 0 9 % bolus 1,000 mL (0 mL Intravenous Stopped 10/9/22 1759)   HYDROmorphone (DILAUDID) injection 1 mg (1 mg Intravenous Given 10/9/22 1614)   potassium chloride (K-DUR,KLOR-CON) CR tablet 40 mEq (40 mEq Oral Given 10/9/22 1653)   iohexol (OMNIPAQUE) 350 MG/ML injection (SINGLE-DOSE) 100 mL (100 mL Intravenous Given 10/9/22 1736)   HYDROmorphone (DILAUDID) tablet 2 mg (2 mg Oral Given 10/9/22 1759)   HYDROmorphone (DILAUDID) injection 1 mg (1 mg Intravenous Given 10/9/22 1932)       Diagnostic Studies  Results Reviewed     Procedure Component Value Units Date/Time    COVID only [[id number]] Collected: 10/09/22 2141    Lab Status: No result Specimen: Nares from Nose     Comprehensive metabolic panel [[id number]]  (Abnormal) Collected: 10/09/22 1614    Lab Status: Final result Specimen: Blood from Arm, Right Updated: 10/09/22 1644     Sodium 139 mmol/L      Potassium 3 1 mmol/L      Chloride 101 mmol/L      CO2 30 mmol/L      ANION GAP 8 mmol/L      BUN 19 mg/dL      Creatinine 1 09 mg/dL      Glucose 155 mg/dL      Calcium 8 4 mg/dL      Corrected Calcium 9 0 mg/dL      AST 34 U/L      ALT 82 U/L      Alkaline Phosphatase 63 U/L      Total Protein 6 4 g/dL      Albumin 3 3 g/dL      Total Bilirubin 0 89 mg/dL      eGFR 91 ml/min/1 73sq m     Narrative:      Luisa guidelines for Chronic Kidney Disease (CKD):   •  Stage 1 with normal or high GFR (GFR > 90 mL/min/1 73 square meters)  •  Stage 2 Mild CKD (GFR = 60-89 mL/min/1 73 square meters)  •  Stage 3A Moderate CKD (GFR = 45-59 mL/min/1 73 square meters)  •  Stage 3B Moderate CKD (GFR = 30-44 mL/min/1 73 square meters)  •  Stage 4 Severe CKD (GFR = 15-29 mL/min/1 73 square meters)  •  Stage 5 End Stage CKD (GFR <15 mL/min/1 73 square meters)  Note: GFR calculation is accurate only with a steady state creatinine    Lipase [[id number]]  (Normal) Collected: 10/09/22 1614    Lab Status: Final result Specimen: Blood from Arm, Right Updated: 10/09/22 1644     Lipase 132 u/L     CBC and differential [[id number]]  (Abnormal) Collected: 10/09/22 1614    Lab Status: Final result Specimen: Blood from Arm, Right Updated: 10/09/22 1622     WBC 18 11 Thousand/uL      RBC 6 45 Million/uL      Hemoglobin 12 2 g/dL      Hematocrit 40 2 %      MCV 62 fL      MCH 18 9 pg      MCHC 30 3 g/dL      RDW 18 3 %      MPV 9 1 fL      Platelets 953 Thousands/uL      nRBC 0 /100 WBCs      Neutrophils Relative 80 %      Immat GRANS % 1 %      Lymphocytes Relative 10 %      Monocytes Relative 8 %      Eosinophils Relative 1 %      Basophils Relative 0 %      Neutrophils Absolute 14 54 Thousands/µL      Immature Grans Absolute 0 12 Thousand/uL      Lymphocytes Absolute 1 73 Thousands/µL      Monocytes Absolute 1 50 Thousand/µL      Eosinophils Absolute 0 20 Thousand/µL      Basophils Absolute 0 02 Thousands/µL                  CT abdomen pelvis with contrast   Final Result by Garry Hinojosa MD (10/09 1823)      Stable mild thickening of the distal small bowel/neorectum with adjacent fluid  Remainder of the bowel is unremarkable              Workstation performed: [id number]                    Procedures  Procedures         ED Course                                             MDM    Disposition  Final diagnoses:   Generalized abdominal pain     Time reflects when diagnosis was documented in both MDM as applicable and the Disposition within this note     Time User Action Codes Description Comment    10/9/2022  9:20 PM Mirna Rae Add [R10 84] Generalized abdominal pain       ED Disposition     ED Disposition   Transfer to [address] Oct 9, 2022  9:20 PM    Comment   Cotorri Gar should be transferred out to  [facility] MD Documentation    [employer] Most Recent Value   Patient Condition The patient has been stabilized such that within reasonable medical probability, no material deterioration of the patient condition or the condition of the unborn child(gonzalez) is likely to result from the transfer   Reason for Transfer Level of Care needed not available at this facility   Benefits of Transfer Specialized equipment and/or services available at the receiving facility (Include comment)________________________   Risks of Transfer Potential deterioration of medical condition, Increased discomfort during transfer   Accepting Physician Dr Mode Lai Name, [address] \[facility] [place] [facility]\""    (Name & Tel number) Isaac Nichole - [facility]   Transported by (Company and Unit #) [employer]   Sending MD DR Mohsen Tesfaye Name, [facility] (Name & Tel number) Isaac Nichole - [employer]   Transported by [facility] and Unit #) [facility]      Follow-up Information    None         Patient's Medications   Discharge Prescriptions    No medications on file       No discharge procedures on file      PDMP Review       Value Time User    PDMP Reviewed  Yes 10/7/2022  3:16 PM Duane Steiner DO          ED Provider  Electronically Signed by           Merlin Brady DO  10/09/22 3383

## 2022-10-09 NOTE — TELEPHONE ENCOUNTER
----- Message from [facility], DO sent at 10/7/2022 11:25 PM EDT -----  Regarding: RE: discharge  Thank you for allowing us to participate in the care of your patient, Darrin Goodpasture, who was hospitalized from 9/30/2022 through 10/7/2022 with the admitting diagnosis of # abdominal pain  Noted to have findings suggestive of SBO on imaging but resolved without any NG tube decompression  Patient underwent pouchoscopy while here and transitioned to prednisone taper on discharge and advised to follow-up with IBD specialist      If you have any additional questions or would like to discuss further, please feel free to contact me      Michael Rodriguez Internal Medicine, Hospitalist  [phone]

## 2022-10-09 NOTE — ED NOTES
Patient requesting more pain medication  Dr Dez Sena notified  No further orders at this time        Debra Barker RN  10/09/22 0915

## 2022-10-10 ENCOUNTER — TRANSITIONAL CARE MANAGEMENT (OUTPATIENT)
Dept: FAMILY MEDICINE CLINIC | Facility: CLINIC | Age: 27
End: 2022-10-10

## 2022-10-10 VITALS
OXYGEN SATURATION: 93 % | HEIGHT: 69 IN | SYSTOLIC BLOOD PRESSURE: 149 MMHG | RESPIRATION RATE: 18 BRPM | TEMPERATURE: 98.1 F | DIASTOLIC BLOOD PRESSURE: 77 MMHG | BODY MASS INDEX: 26.96 KG/M2 | WEIGHT: 182 LBS | HEART RATE: 86 BPM

## 2022-10-10 LAB
ANION GAP SERPL CALCULATED.3IONS-SCNC: 5 MMOL/L (ref 4–13)
BASOPHILS # BLD AUTO: 0.02 THOUSANDS/ΜL (ref 0–0.1)
BASOPHILS NFR BLD AUTO: 0 % (ref 0–1)
BUN SERPL-MCNC: 13 MG/DL (ref 5–25)
CALCIUM SERPL-MCNC: 8.3 MG/DL (ref 8.3–10.1)
CHLORIDE SERPL-SCNC: 103 MMOL/L (ref 96–108)
CO2 SERPL-SCNC: 30 MMOL/L (ref 21–32)
CREAT SERPL-MCNC: 0.86 MG/DL (ref 0.6–1.3)
EOSINOPHIL # BLD AUTO: 0.23 THOUSAND/ΜL (ref 0–0.61)
EOSINOPHIL NFR BLD AUTO: 2 % (ref 0–6)
ERYTHROCYTE [DISTWIDTH] IN BLOOD BY AUTOMATED COUNT: 16.7 % (ref 11.6–15.1)
GFR SERPL CREATININE-BSD FRML MDRD: 117 ML/MIN/1.73SQ M
GLUCOSE SERPL-MCNC: 84 MG/DL (ref 65–140)
HCT VFR BLD AUTO: 32.8 % (ref 36.5–49.3)
HGB BLD-MCNC: 9.9 G/DL (ref 12–17)
IMM GRANULOCYTES # BLD AUTO: 0.11 THOUSAND/UL (ref 0–0.2)
IMM GRANULOCYTES NFR BLD AUTO: 1 % (ref 0–2)
LYMPHOCYTES # BLD AUTO: 2.04 THOUSANDS/ΜL (ref 0.6–4.47)
LYMPHOCYTES NFR BLD AUTO: 17 % (ref 14–44)
MCH RBC QN AUTO: 19 PG (ref 26.8–34.3)
MCHC RBC AUTO-ENTMCNC: 30.2 G/DL (ref 31.4–37.4)
MCV RBC AUTO: 63 FL (ref 82–98)
MONOCYTES # BLD AUTO: 1.17 THOUSAND/ΜL (ref 0.17–1.22)
MONOCYTES NFR BLD AUTO: 9 % (ref 4–12)
NEUTROPHILS # BLD AUTO: 8.82 THOUSANDS/ΜL (ref 1.85–7.62)
NEUTS SEG NFR BLD AUTO: 71 % (ref 43–75)
NRBC BLD AUTO-RTO: 0 /100 WBCS
PLATELET # BLD AUTO: 355 THOUSANDS/UL (ref 149–390)
PMV BLD AUTO: 9.2 FL (ref 8.9–12.7)
POTASSIUM SERPL-SCNC: 3.6 MMOL/L (ref 3.5–5.3)
RBC # BLD AUTO: 5.2 MILLION/UL (ref 3.88–5.62)
SODIUM SERPL-SCNC: 138 MMOL/L (ref 135–147)
WBC # BLD AUTO: 12.39 THOUSAND/UL (ref 4.31–10.16)

## 2022-10-10 PROCEDURE — 80048 BASIC METABOLIC PNL TOTAL CA: CPT

## 2022-10-10 PROCEDURE — 85025 COMPLETE CBC W/AUTO DIFF WBC: CPT

## 2022-10-10 PROCEDURE — 99225 PR SBSQ OBSERVATION CARE/DAY 25 MINUTES: CPT | Performed by: FAMILY MEDICINE

## 2022-10-10 PROCEDURE — 99217 PR OBSERVATION CARE DISCHARGE MANAGEMENT: CPT | Performed by: FAMILY MEDICINE

## 2022-10-10 RX ORDER — PREDNISONE 10 MG/1
10 TABLET ORAL DAILY
Status: DISCONTINUED | OUTPATIENT
Start: 2022-11-18 | End: 2022-10-11 | Stop reason: HOSPADM

## 2022-10-10 RX ORDER — PREDNISONE 1 MG/1
5 TABLET ORAL DAILY
Status: DISCONTINUED | OUTPATIENT
Start: 2022-11-25 | End: 2022-10-11 | Stop reason: HOSPADM

## 2022-10-10 RX ORDER — PREDNISONE 20 MG/1
20 TABLET ORAL DAILY
Status: DISCONTINUED | OUTPATIENT
Start: 2022-11-04 | End: 2022-10-11 | Stop reason: HOSPADM

## 2022-10-10 RX ADMIN — HYDROMORPHONE HYDROCHLORIDE 0.5 MG: 1 INJECTION, SOLUTION INTRAMUSCULAR; INTRAVENOUS; SUBCUTANEOUS at 08:55

## 2022-10-10 RX ADMIN — HYDROMORPHONE HYDROCHLORIDE 0.5 MG: 1 INJECTION, SOLUTION INTRAMUSCULAR; INTRAVENOUS; SUBCUTANEOUS at 16:12

## 2022-10-10 RX ADMIN — PREDNISONE 40 MG: 20 TABLET ORAL at 08:57

## 2022-10-10 RX ADMIN — HYDROMORPHONE HYDROCHLORIDE 0.5 MG: 1 INJECTION, SOLUTION INTRAMUSCULAR; INTRAVENOUS; SUBCUTANEOUS at 19:38

## 2022-10-10 RX ADMIN — METRONIDAZOLE 500 MG: 500 TABLET ORAL at 08:56

## 2022-10-10 RX ADMIN — METRONIDAZOLE 500 MG: 500 TABLET ORAL at 16:12

## 2022-10-10 RX ADMIN — PANTOPRAZOLE SODIUM 40 MG: 40 TABLET, DELAYED RELEASE ORAL at 16:12

## 2022-10-10 RX ADMIN — METRONIDAZOLE 500 MG: 500 TABLET ORAL at 00:32

## 2022-10-10 RX ADMIN — DULOXETINE HYDROCHLORIDE 60 MG: 30 CAPSULE, DELAYED RELEASE ORAL at 08:56

## 2022-10-10 RX ADMIN — PANTOPRAZOLE SODIUM 40 MG: 40 TABLET, DELAYED RELEASE ORAL at 05:16

## 2022-10-10 RX ADMIN — HYDROMORPHONE HYDROCHLORIDE 0.5 MG: 1 INJECTION, SOLUTION INTRAMUSCULAR; INTRAVENOUS; SUBCUTANEOUS at 12:29

## 2022-10-10 RX ADMIN — ENOXAPARIN SODIUM 40 MG: 40 INJECTION SUBCUTANEOUS at 08:55

## 2022-10-10 RX ADMIN — Medication 250 MG: at 18:17

## 2022-10-10 RX ADMIN — SODIUM CHLORIDE 125 ML/HR: 0.9 INJECTION, SOLUTION INTRAVENOUS at 00:32

## 2022-10-10 RX ADMIN — Medication 250 MG: at 08:56

## 2022-10-10 RX ADMIN — HYDROMORPHONE HYDROCHLORIDE 0.5 MG: 1 INJECTION, SOLUTION INTRAMUSCULAR; INTRAVENOUS; SUBCUTANEOUS at 01:42

## 2022-10-10 RX ADMIN — HYDROMORPHONE HYDROCHLORIDE 0.5 MG: 1 INJECTION, SOLUTION INTRAMUSCULAR; INTRAVENOUS; SUBCUTANEOUS at 04:48

## 2022-10-10 NOTE — ASSESSMENT & PLAN NOTE
POA as evidenced by tachycardia, WBC 18 11  · Leukocytosis most likely due to steroid use and is trending down  · Tachycardia secondary to pain/dehydration  · No signs of infection

## 2022-10-10 NOTE — ASSESSMENT & PLAN NOTE
Patient presents to ER with abdominal pain, ED discussed with surgeon and accepted for transfer to [address] - accepting doc Anderson Benton  · No bed availabiity at [address], patient will stay here until bed available  · Pt discharged home 10/7 on flagyl, steroids, was to f/u with IBD specialist/[facility] about starting  therapy  · CT a/p: Stable mild thickening of the distal small bowel/neorectum with adjacent fluid      · Continue flagyl  · Continue prednisone 40mg, taper down by 5mg weekly  · Pain medication prn  · Clear liquids --> advanced to full liquids  · Received IVF

## 2022-10-10 NOTE — ASSESSMENT & PLAN NOTE
Patient presents to ER with abdominal pain, ED discussed with surgeon and accepted for transfer to [address] - accepting doc Michael Mon  · No bed availabiity at [address], will admit until available   · Pt discharged home 2 days ago on flagyl, steroids, was to f/u with IBD specialist/[facility] about starting  therapy  · CT a/p: Stable mild thickening of the distal small bowel/neorectum with adjacent fluid  Remainder of the bowel is unremarkable    · Continue flagyl  · Continue prednisone 40mg, taper down by 5mg weekly  · Pain medication prn  · Clear liquids  · IVFs

## 2022-10-10 NOTE — UTILIZATION REVIEW
Initial Clinical Review    Admission: Date/Time/Statement:   Admission Orders (From admission, onward)     Ordered        10/09/22 2243  Place in Observation  Once                      Orders Placed This Encounter   Procedures   • Place in Observation     Standing Status:   Standing     Number of Occurrences:   1     Order Specific Question:   Level of Care     Answer:   Med Surg [16]     ED Arrival Information     Expected   -    Arrival   10/9/2022 15:40    Acuity   Urgent            Means of arrival   Wheelchair    Escorted by   Family Member    Service   Hospitalist    Admission type   Emergency            Arrival complaint   abdominal pain           Chief Complaint   Patient presents with   • Dizziness     Patient C/O dizziness and nausea with abd pain since he woke up this am  Patient states he feels like everything around him is moving  • Abdominal Pain     C/O mid abd pain worse this am 8/10  Was just D/C from hospital on Friday for SBO  Initial Presentation:  32 yom to ER from home c/o dizziness, nausea, abd pain, diarrhea  Pt discharged home 2 days ago on flagyl, steroids, was to f/u with IBD specialist/[facility] about starting  therapy  Hx ulcerative colitis status post total proctocolectomy with J-pouch ileal anal anastomosis, anastomotic stricture and persistent inflammation, questionable pouchitis  Presents tachycardic, epigastric tenderness, +BS x4  Admission work-up showing leukocytosis, small bowel thickening per CT  Placed in observation status for abd pain  10/10/22:  Patient is being transferred to [facility] under care of his surgeon      ED Triage Vitals [10/09/22 1557]   Temperature Pulse Respirations Blood Pressure SpO2   98 2 °F (36 8 °C) (!) 112 22 109/83 99 %      Temp Source Heart Rate Source Patient Position - Orthostatic VS BP Location FiO2 (%)   Oral Monitor Lying Left arm --      Pain Score       8          Wt Readings from Last 1 Encounters:   10/09/22 82 6 kg (182 lb) Additional Vital Signs:   Date/Time Temp Pulse Resp BP MAP (mmHg) SpO2 O2 Device Patient Position - Orthostatic VS   10/10/22 02:58:53 97 8 °F (36 6 °C) 74 -- 122/82 95 96 % -- --   10/09/22 23:59:46 98 °F (36 7 °C) 88 16 112/78 89 98 % None (Room air) Lying   10/09/22 2226 -- 102 18 122/79 96 94 % None (Room air) Lying   10/09/22 2000 -- 101 16 127/74 93 95 % -- --   10/09/22 1930 -- 115 Abnormal  18 127/84 101 97 % None (Room air) Sitting   10/09/22 1905 -- 121 Abnormal  19 126/87 102 99 % -- --   10/09/22 1850 -- 105 17 -- -- 99 % -- --   10/09/22 1835 -- 102 9 Abnormal  129/79 98 99 % -- --   10/09/22 1820 -- 101 14 -- -- 100 % -- --   10/09/22 1805 -- 106 Abnormal  13 126/76 96 100 % -- --   10/09/22 1750 -- 98 13 -- -- 100 % -- --   10/09/22 1735 -- 101 -- 123/72 91 100 % -- --   10/09/22 1706 -- 100 13 117/71 88 98 % -- --   10/09/22 1705 -- 101 13 -- -- 98 % -- --   10/09/22 1651 -- 103 16 -- -- 97 % -- --     Pertinent Labs/Diagnostic Test Results:   CT abdomen pelvis with contrast   Final Result by Sin Nichole MD (10/09 1823)      Stable mild thickening of the distal small bowel/neorectum with adjacent fluid  Remainder of the bowel is unremarkable          Results from last 7 days   Lab Units 10/09/22  2141 10/03/22  1759   SARS-COV-2  Negative Negative     Results from last 7 days   Lab Units 10/10/22  0520 10/09/22  1614 10/07/22  0622 10/06/22  0601 10/05/22  0555   WBC Thousand/uL 12 39* 18 11* 19 28* 18 72* 13 99*   HEMOGLOBIN g/dL 9 9* 12 2 10 5* 10 4* 10 6*   HEMATOCRIT % 32 8* 40 2 34 4* 34 2* 34 5*   PLATELETS Thousands/uL 355 452* 467* 439* 447*   NEUTROS ABS Thousands/µL 8 82* 14 54* 17 15* 16 53* 11 39*     Results from last 7 days   Lab Units 10/10/22  0520 10/09/22  1614 10/07/22  0622 10/06/22  0601 10/05/22  0555   SODIUM mmol/L 138 139 135 136 137   POTASSIUM mmol/L 3 6 3 1* 4 0 3 8 3 6   CHLORIDE mmol/L 103 101 101 103 102   CO2 mmol/L 30 30 29 30 28   ANION GAP mmol/L 5 8 5 3* 7 BUN mg/dL 13 19 11 10 12   CREATININE mg/dL 0 86 1 09 0 83 0 78 0 87   EGFR ml/min/1 73sq m 117 91 118 121 116   CALCIUM mg/dL 8 3 8 4 8 4 8 3 8 1*     Results from last 7 days   Lab Units 10/09/22  1614 10/07/22  0622 10/06/22  0601 10/05/22  0555 10/04/22  0642   AST U/L 34 10 6 8 6   ALT U/L 82* 23 32 16 17   ALK PHOS U/L 63 50 54 54 48   TOTAL PROTEIN g/dL 6 4 5 6* 5 7* 5 6* 5 8*   ALBUMIN g/dL 3 3* 2 8* 2 9* 2 8* 2 8*   TOTAL BILIRUBIN mg/dL 0 89 0 51 0 49 0 46 0 69     Results from last 7 days   Lab Units 10/10/22  0520 10/09/22  1614 10/07/22  0622 10/06/22  0601 10/05/22  0555 10/04/22  0642   GLUCOSE RANDOM mg/dL 84 155* 127 123 151* 111     Results from last 7 days   Lab Units 10/09/22  1614   LIPASE u/L 132     ED Treatment:   Medication Administration from 10/09/2022 1540 to 10/09/2022 2356       Date/Time Order Dose Route Action     10/09/2022 1614 sodium chloride 0 9 % bolus 1,000 mL 1,000 mL Intravenous New Bag     10/09/2022 1614 HYDROmorphone (DILAUDID) injection 1 mg 1 mg Intravenous Given     10/09/2022 1653 potassium chloride (K-DUR,KLOR-CON) CR tablet 40 mEq 40 mEq Oral Given     10/09/2022 1736 iohexol (OMNIPAQUE) 350 MG/ML injection (SINGLE-DOSE) 100 mL 100 mL Intravenous Given     10/09/2022 1759 HYDROmorphone (DILAUDID) tablet 2 mg 2 mg Oral Given     10/09/2022 1932 HYDROmorphone (DILAUDID) injection 1 mg 1 mg Intravenous Given     10/09/2022 2227 HYDROmorphone (DILAUDID) injection 0 5 mg 0 5 mg Intravenous Given        Past Medical History:   Diagnosis Date   • Ankylosing spondylitis (HCC)    • Anxiety    • Bowel obstruction (HCC)    • Clostridium difficile colitis 9/13/2018   • Colitis    • 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[facility] [address] ) 9/13/2018   • Pancreatitis    • Ulcerative colitis ([facility] [address] )      Present on Admission:  • Abdominal pain  • 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HCC)  • CAR (generalized anxiety disorder)  • Ankylosing spondylitis (HCC)    Admitting Diagnosis: Abdominal pain [R10 9]  Generalized abdominal pain [R10 84]  Dizzy [R42]  Age/Sex: 32 y o  male  Admission Orders:  Cont pulse ox  Scd/foot pumps    Scheduled Medications:  DULoxetine, 60 mg, Oral, Daily  enoxaparin, 40 mg, Subcutaneous, Daily  metroNIDAZOLE, 500 mg, Oral, Q8H KAITLIN  pantoprazole, 40 mg, Oral, BID AC  predniSONE, 40 mg, Oral, Daily  saccharomyces boulardii, 250 mg, Oral, BID    Continuous IV Infusions:  sodium chloride, 125 mL/hr, Intravenous, Continuous    PRN Meds:  acetaminophen, 650 mg, Oral, Q6H PRN  HYDROmorphone, 0 5 mg, Intravenous, Q3H PRN, 10/10 x2  ondansetron, 4 mg, Intravenous, Q6H PRN  oxyCODONE-acetaminophen, 1 tablet, Oral, Q4H PRN    Network Utilization Review Department  ATTENTION: Please call with any questions or concerns to [phone] and carefully listen to the prompts so that you are directed to the right person  All voicemails are confidential   Harlene Pair all requests for admission clinical reviews, approved or denied determinations and any other requests to dedicated fax number below belonging to the campus where the patient is receiving treatment   List of dedicated fax numbers for the Facilities:  [address] DENIALS (Administrative/Medical Necessity) [phone]   [address] (Maternity/NICU/Pediatrics) [phone]   919 Ashtyn Perez [phone]   [facility] [address] [phone]   1304 [facility] [place] [address] [address] Carmine Cortes 28 [phone]   1558 [facility] Leonora Martinez [facility] [address] [phone]

## 2022-10-10 NOTE — ASSESSMENT & PLAN NOTE
S/p pouchoscopy on 10/4 which was negative for stricture or inflammation  · Continue flagyl, steroids at above

## 2022-10-10 NOTE — ED NOTES
Spoke to [place], transfer RN from [facility] regarding some questions, was able to address all concerns at this time       Shant Daley, ZACHARY  10/09/22 2059

## 2022-10-10 NOTE — H&P
Stephanie 47 1995, 32 y o  male MRN: [id number]  Unit/Bed#: PAYTON Encounter: [id number]  Primary Care Provider: Salbador [place] DO   Date and time admitted to hospital: 10/9/2022  3:52 PM    * Abdominal pain  Assessment & Plan  Patient presents to ER with abdominal pain, ED discussed with surgeon and accepted for transfer to [address] [place] [address] - accepting doc Alta Spatz  · No bed availabiity at [address] [facility], will admit until available   · Pt discharged home 2 days ago on flagyl, steroids, was to f/u with IBD specialist/[facility] about starting  therapy  · CT a/p: Stable mild thickening of the distal small bowel/neorectum with adjacent fluid  Remainder of the bowel is unremarkable  · Continue flagyl  · Continue prednisone 40mg, taper down by 5mg weekly  · Pain medication prn  · Clear liquids  · IVFs     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[address] )  Assessment & Plan  S/p pouchoscopy on 10/4 which was negative for stricture or inflammation  · Continue flagyl, steroids at above    SIRS (systemic inflammatory response syndrome) (HCC)  Assessment & Plan  POA as evidenced by tachycardia, WBC 18 11  · Leukocytosis most likely secondary to steroid use  · Tachycardia secondary to pain/dehydration  · Continue flagyl    Ankylosing spondylitis (HCC)  Assessment & Plan  Not currently on biologic therapy    CAR (generalized anxiety disorder)  Assessment & Plan  Continue Cymbalta 60mg daily    VTE Pharmacologic Prophylaxis: VTE Score: 3 Moderate Risk (Score 3-4) - Pharmacological DVT Prophylaxis Ordered: enoxaparin (Lovenox)  Code Status: Full code  Discussion with family: Patient declined call to   Anticipated Length of Stay: Patient will be admitted on an observation basis with an anticipated length of stay of less than 2 midnights secondary to abdominal pain, bed placement      Total Time for Visit, including Counseling / Coordination of Care: 45 minutes Greater than 50% of this total time spent on direct patient counseling and coordination of care  Chief Complaint: abdominal pain    History of Present Illness:  Padilla Fine is a 32 y o  male with a PMH of ulcerative colitis status post total proctocolectomy with J-pouch ileal anal anastomosis, anastomotic stricture and persistent inflammation, questionable pouchitis who presents with abdominal pain  Patient was discharged 2 days ago from the hospital  He states that starting last night he had multiple episodes of nonbloody diarrhea and dizziness  He notes severe epigastric abdominal pain today and has not had a bowel movement today    Denies any fevers, chills, chest pain, SOB, cough, hematochezia, melena  CT scan shows similar inflammatory changes as prior  Dr Carmine Okeefe discussed with surgeon at [facility] and was accepted for transfer  No bed availability so pt will be admitted to us until transfer  Review of Systems:  Review of Systems   Constitutional: Negative for chills and fever  Respiratory: Negative for cough and shortness of breath  Cardiovascular: Negative for chest pain and palpitations  Gastrointestinal: Positive for abdominal pain and diarrhea  Genitourinary: Negative for dysuria and hematuria  Musculoskeletal: Negative for arthralgias and back pain  Skin: Negative for color change and rash  Neurological: Positive for dizziness  All other systems reviewed and are negative        Past Medical and Surgical History:   Past Medical History:   Diagnosis Date   • Ankylosing spondylitis ([facility] [address] )    • Anxiety    • Bowel obstruction ([address] )    • Clostridium difficile colitis 9/13/2018   • Colitis    • 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[address] ) 9/13/2018   • Pancreatitis    • Ulcerative colitis ([facility] [address] )        Past Surgical History:   Procedure Laterality Date   • COLECTOMY TOTAL      with ileal pouch and anastemosis   • IR PICC PLACEMENT SINGLE LUMEN  3/1/2022   • TOTAL COLECTOMY         Meds/Allergies:  Prior to Admission medications    Medication Sig Start Date End Date Taking? Authorizing Provider   DULoxetine (CYMBALTA) 60 mg delayed release capsule Take 1 capsule (60 mg total) by mouth daily 6/10/22   Abigail Holder DO   metroNIDAZOLE (FLAGYL) 500 mg tablet Take 1 tablet (500 mg total) by mouth every 8 (eight) hours 8/27/22 10/26/22  Darlin Martinez DO   ondansetron (ZOFRAN) 4 mg tablet Take 1 tablet (4 mg total) by mouth every 6 (six) hours 9/29/22   Selina Montenegro MD   pantoprazole (PROTONIX) 40 mg tablet Take 1 tablet (40 mg total) by mouth 2 (two) times a day 9/26/22 10/26/22  Darlin Martinez DO   predniSONE 10 mg tablet Take 4 tablets (40 mg total) by mouth daily for 7 days, THEN 3 5 tablets (35 mg total) daily for 7 days, THEN 3 tablets (30 mg total) daily for 7 days, THEN 2 5 tablets (25 mg total) daily for 7 days, THEN 2 tablets (20 mg total) daily for 7 days, THEN 1 5 tablets (15 mg total) daily for 7 days, THEN 1 tablet (10 mg total) daily for 7 days, THEN 0 5 tablets (5 mg total) daily for 7 days  10/7/22 12/2/22  Darlin Martinez DO   saccharomyces boulardii (FLORASTOR) 250 mg capsule Take 1 capsule (250 mg total) by mouth 2 (two) times a day 8/27/22   Darlin Martinez DO   traMADol (ULTRAM) 50 mg tablet Take 1 tablet (50 mg total) by mouth every 6 (six) hours as needed for moderate pain or severe pain for up to 10 doses 10/7/22   Gaudencio Verma DO     I have reviewed home medications with patient personally  Allergies:    Allergies   Allergen Reactions   • Cefazolin Hives     Other reaction(s): Arrythmia (Abnormal Heartbeat), Rash Maculopapular   • Mesalamine GI Intolerance     Other reaction(s): Pancreatitis  Annotation - [id number]: pancreatitis       Social History:  Marital Status: Single   Occupation:   Patient Pre-hospital Living Situation: Home  Patient Pre-hospital Level of Mobility: walks  Patient Pre-hospital Diet Restrictions:   Substance Use History:   Social History     Substance and Sexual Activity   Alcohol Use Not Currently Comment: pt states 5 a month/socially     Social History     Tobacco Use   Smoking Status Never Smoker   Smokeless Tobacco Never Used     Social History     Substance and Sexual Activity   Drug Use No       Family History:  History reviewed  No pertinent family history  Physical Exam:     Vitals:   Blood Pressure: 122/79 (10/09/22 2226)  Pulse: 102 (10/09/22 2226)  Temperature: 98 2 °F (36 8 °C) (10/09/22 1557)  Temp Source: Oral (10/09/22 1557)  Respirations: 18 (10/09/22 2226)  Weight - Scale: 82 6 kg (182 lb) (10/09/22 1557)  SpO2: 94 % (10/09/22 2226)    Physical Exam  Vitals reviewed  Constitutional:       Appearance: He is well-developed  HENT:      Head: Normocephalic and atraumatic  Eyes:      Conjunctiva/sclera: Conjunctivae normal    Cardiovascular:      Rate and Rhythm: Regular rhythm  Tachycardia present  Heart sounds: No murmur heard  Pulmonary:      Effort: Pulmonary effort is normal  No respiratory distress  Breath sounds: Normal breath sounds  Abdominal:      General: There is no distension  Palpations: Abdomen is soft  Tenderness: There is abdominal tenderness  There is no guarding  Comments: Epigastric tenderness, + bowel sounds x4 quadrants   Skin:     General: Skin is warm and dry  Neurological:      Mental Status: He is alert            Additional Data:     Lab Results:  Results from last 7 days   Lab Units 10/09/22  1614   WBC Thousand/uL 18 11*   HEMOGLOBIN g/dL 12 2   HEMATOCRIT % 40 2   PLATELETS Thousands/uL 452*   NEUTROS PCT % 80*   LYMPHS PCT % 10*   MONOS PCT % 8   EOS PCT % 1     Results from last 7 days   Lab Units 10/09/22  1614   SODIUM mmol/L 139   POTASSIUM mmol/L 3 1*   CHLORIDE mmol/L 101   CO2 mmol/L 30   BUN mg/dL 19   CREATININE mg/dL 1 09   ANION GAP mmol/L 8   CALCIUM mg/dL 8 4   ALBUMIN g/dL 3 3*   TOTAL BILIRUBIN mg/dL 0 89   ALK PHOS U/L 63   ALT U/L 82*   AST U/L 34   GLUCOSE RANDOM mg/dL 155*                       Imaging: Reviewed radiology reports from this admission including: abdominal/pelvic CT  CT abdomen pelvis with contrast   Final Result by Lou Rodriguez MD (10/09 1823)      Stable mild thickening of the distal small bowel/neorectum with adjacent fluid  Remainder of the bowel is unremarkable  Workstation performed: [id number]             EKG and Other Studies Reviewed on Admission:   · EKG: No EKG obtained  ** Please Note: This note has been constructed using a voice recognition system   **

## 2022-10-10 NOTE — PLAN OF CARE
Problem: Nutrition/Hydration-ADULT  Goal: Nutrient/Hydration intake appropriate for improving, restoring or maintaining nutritional needs  Description: Monitor and assess patient's nutrition/hydration status for malnutrition  Collaborate with interdisciplinary team and initiate plan and interventions as ordered  Monitor patient's weight and dietary intake as ordered or per policy  Utilize nutrition screening tool and intervene as necessary  Determine patient's food preferences and provide high-protein, high-caloric foods as appropriate       INTERVENTIONS:  - Monitor oral intake, urinary output, labs, and treatment plans  - Assess nutrition and hydration status and recommend course of action  - Evaluate amount of meals eaten  - Assist patient with eating if necessary   - Allow adequate time for meals  - Recommend/ encourage appropriate diets, oral nutritional supplements, and vitamin/mineral supplements  - Order, calculate, and assess calorie counts as needed  - Recommend, monitor, and adjust tube feedings and TPN/PPN based on assessed needs  - Assess need for intravenous fluids  - Provide specific nutrition/hydration education as appropriate  - Include patient/family/caregiver in decisions related to nutrition  Outcome: Progressing     Problem: PAIN - ADULT  Goal: Verbalizes/displays adequate comfort level or baseline comfort level  Description: Interventions:  - Encourage patient to monitor pain and request assistance  - Assess pain using appropriate pain scale  - Administer analgesics based on type and severity of pain and evaluate response  - Implement non-pharmacological measures as appropriate and evaluate response  - Consider cultural and social influences on pain and pain management  - Notify physician/advanced practitioner if interventions unsuccessful or patient reports new pain  Outcome: Progressing     Problem: GASTROINTESTINAL - ADULT  Goal: Minimal or absence of nausea and/or vomiting  Description: INTERVENTIONS:  - Administer IV fluids if ordered to ensure adequate hydration  - Maintain NPO status until nausea and vomiting are resolved  - Nasogastric tube if ordered  - Administer ordered antiemetic medications as needed  - Provide nonpharmacologic comfort measures as appropriate  - Advance diet as tolerated, if ordered  - Consider nutrition services referral to assist patient with adequate nutrition and appropriate food choices  Outcome: Progressing  Goal: Maintains or returns to baseline bowel function  Description: INTERVENTIONS:  - Assess bowel function  - Encourage oral fluids to ensure adequate hydration  - Administer IV fluids if ordered to ensure adequate hydration  - Administer ordered medications as needed  - Encourage mobilization and activity  - Consider nutritional services referral to assist patient with adequate nutrition and appropriate food choices  Outcome: Progressing  Goal: Maintains adequate nutritional intake  Description: INTERVENTIONS:  - Monitor percentage of each meal consumed  - Identify factors contributing to decreased intake, treat as appropriate  - Assist with meals as needed  - Monitor I&O, weight, and lab values if indicated  - Obtain nutrition services referral as needed  Outcome: Progressing  Goal: Oral mucous membranes remain intact  Description: INTERVENTIONS  - Assess oral mucosa and hygiene practices  - Implement preventative oral hygiene regimen  - Implement oral medicated treatments as ordered  - Initiate Nutrition services referral as needed  Outcome: Progressing

## 2022-10-10 NOTE — ASSESSMENT & PLAN NOTE
S/p pouchoscopy on 10/4 which was negative for stricture or inflammation  · Continue prednisone as noted above    Continue flagyl

## 2022-10-10 NOTE — EMTALA/ACUTE CARE TRANSFER
[address] [address]  Dept: [phone]      EMTALA TRANSFER CONSENT    NAME Kervin Gar                                         1995                              MRN [id number]    I have been informed of my rights regarding examination, treatment, and transfer   by Dr Martin Mario DO    Benefits: Specialized equipment and/or services available at the receiving facility (Include comment)________________________    Risks: Potential deterioration of medical condition, Increased discomfort during transfer      Consent for Transfer:  I acknowledge that my medical condition has been evaluated and explained to me by the emergency department physician or other qualified medical person and/or my attending physician, who has recommended that I be transferred to the service of  Accepting Physician: Dr Mohsen Valiente at [address] [place] [address] Name, [facility] : [facility]  The above potential benefits of such transfer, the potential risks associated with such transfer, and the probable risks of not being transferred have been explained to me, and I fully understand them  The doctor has explained that, in my case, the benefits of transfer outweigh the risks  I agree to be transferred  I authorize the performance of emergency medical procedures and treatments upon me in both transit and upon arrival at the receiving facility  Additionally, I authorize the release of any and all medical records to the receiving facility and request they be transported with me, if possible  I understand that the safest mode of transportation during a medical emergency is an ambulance and that the Hospital advocates the use of this mode of transport   Risks of traveling to the receiving facility by car, including absence of medical control, life sustaining equipment, such as oxygen, and medical personnel has been explained to me and I fully understand them     ([facility])  [  ]  I consent to the stated transfer and to be transported by ambulance/helicopter  [  ]  I consent to the stated transfer, but refuse transportation by ambulance and accept full responsibility for my transportation by car  I understand the risks of non-ambulance transfers and I exonerate the Hospital and its staff from any deterioration in my condition that results from this refusal     X___________________________________________    DATE  10/09/22  TIME________  Signature of patient or legally responsible individual signing on patient behalf           RELATIONSHIP TO PATIENT_________________________          Provider Certification    NAME Celestino Abad                                         1995                              MRN [id number]    A medical screening exam was performed on the above named patient  Based on the examination:    Condition Necessitating Transfer The encounter diagnosis was Generalized abdominal pain  Patient Condition: The patient has been stabilized such that within reasonable medical probability, no material deterioration of the patient condition or the condition of the unborn child(gonzalez) is likely to result from the transfer    Reason for Transfer: Level of Care needed not available at this facility    Transfer Requirements: [address]   · Space available and qualified personnel available for treatment as acknowledged by Jose Martin Reyes  · Agreed to accept transfer and to provide appropriate medical treatment as acknowledged by       Dr Yessica Stubbs  · Appropriate medical records of the examination and treatment of the patient are provided at the time of transfer   [address] _______  · Transfer will be performed by qualified personnel from [facility]  and appropriate transfer equipment as required, including the use of necessary and appropriate life support measures      Provider Certification: I have examined the patient and explained the following risks and benefits of being transferred/refusing transfer to the patient/family:         Based on these reasonable risks and benefits to the patient and/or the unborn child(gonzalez), and based upon the information available at the time of the patient’s examination, I certify that the medical benefits reasonably to be expected from the provision of appropriate medical treatments at another medical facility outweigh the increasing risks, if any, to the individual’s medical condition, and in the case of labor to the unborn child, from effecting the transfer      X____________________________________________ DATE 10/09/22        TIME_______      ORIGINAL - SEND TO MEDICAL RECORDS   COPY - SEND WITH PATIENT DURING TRANSFER

## 2022-10-10 NOTE — UTILIZATION REVIEW
Notification of Discharge   This is a Notification of Discharge from our facility [address]  Please be advised that this patient has been discharge from our facility  Below you will find the admission and discharge date and time including the patient’s disposition  UTILIZATION REVIEW CONTACT:  Marcel Guzman  Utilization   Network Utilization Review Department  Phone: [phone] x carefully listen to the prompts  All voicemails are confidential   Email: [email or web address]     PHYSICIAN ADVISORY SERVICES:  FOR [id number] REVIEW - MEDICAL NECESSITY DENIAL  Phone: [phone]  Fax: [phone]  Email: [email or web address]     PRESENTATION DATE: 9/30/2022  9:31 AM  OBERVATION ADMISSION DATE:   INPATIENT ADMISSION DATE: 9/30/22 12:09 PM   DISCHARGE DATE: 10/7/2022  4:55 PM  DISPOSITION: Home/Self Care Home/Self Care      IMPORTANT INFORMATION:  Send all requests for admission clinical reviews, approved or denied determinations and any other requests to dedicated fax number below belonging to the campus where the patient is receiving treatment   List of dedicated fax numbers:  [address] [place] [address] DENIALS (Administrative/Medical Necessity) [phone]   [address] [place] [address] (Maternity/NICU/Pediatrics) [phone]   [facility] [phone]   [address] [phone]   [address] [phone]   [address] [facility] [phone]   [address] [place] [address] [phone]   [address] [place] [address] 119 [phone]   [facility]  [phone] [facility] [phone]   [address] [place] [address] 850 [facility] [phone]

## 2022-10-10 NOTE — ASSESSMENT & PLAN NOTE
POA as evidenced by tachycardia, WBC 18 11  · Leukocytosis most likely secondary to steroid use  · Tachycardia secondary to pain/dehydration  · Continue flagyl

## 2022-10-11 NOTE — CASE MANAGEMENT
Per Chucho Villarreal @ [facility], due to pt not admitting, Ambualnce transfer is covered under admission into new hospital admission in [facility], no need for transport auth  Ref #[id number]

## 2022-10-11 NOTE — ASSESSMENT & PLAN NOTE
Patient presents to ER with abdominal pain, ED discussed with surgeon and accepted for transfer to [facility] - accepting ludmila Dimas   Patient was admitted due to no bed availabiity at [facility] last night  · Pt recently discharged home 10/7 on flagyl, steroids, was to f/u with IBD specialist/[facility] about starting  therapy  · CT a/p: Stable mild thickening of the distal small bowel/neorectum with adjacent fluid  · Continue flagyl  · Continue prednisone 40mg, taper down by 5mg weekly  · Pain medication prn  · Clear liquids --> advanced to full liquids  · Received IVF  · Patient is being transferred to [facility]

## 2022-10-11 NOTE — DISCHARGE SUMMARY
[address]  Discharge- Larry Howard 1995, 32 y o  male MRN: [id number]  Unit/Bed#: [address] [place] [address] Encounter: [id number]  Primary Care Provider: Anita Jalloh DO   Date and time admitted to hospital: 10/9/2022  3:52 PM    * Abdominal pain  Assessment & Plan  Patient presents to ER with abdominal pain, ED discussed with surgeon and accepted for transfer to [address] [place] [address] - accepting ludmila Ma   Patient was admitted due to no bed availabiity at [facility] last night  · Pt recently discharged home 10/7 on flagyl, steroids, was to f/u with IBD specialist/[facility] about starting  therapy  · CT a/p: Stable mild thickening of the distal small bowel/neorectum with adjacent fluid  · Continue flagyl  · Continue prednisone 40mg, taper down by 5mg weekly  · Pain medication prn  · Clear liquids --> advanced to full liquids  · Received IVF  · Patient is being transferred to [facility]  SIRS (systemic inflammatory response syndrome) ([facility])  Assessment & Plan  POA as evidenced by tachycardia, WBC 18 11  · Leukocytosis most likely due to steroid use and is trending down  · Tachycardia secondary to pain/dehydration  · No signs of infection    Ankylosing spondylitis [facility])  Assessment & Plan  Not currently on biologic therapy  CAR (generalized anxiety disorder)  Assessment & Plan  Continue Cymbalta 60mg daily    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[facility] [address] )  Assessment & Plan  S/p pouchoscopy on 10/4 which was negative for stricture or inflammation  · Continue prednisone as noted above  Continue flagyl      Discharging Physician / Practitioner: Rip Gary  PCP: Anita Jalloh DO  Admission Date: 10/9/2022  Discharge Date: 10/10/22    Reason for Admission: Dizziness (Patient C/O dizziness and nausea with abd pain since he woke up this am  Patient states he feels like everything around him is moving  ) and Abdominal Pain (C/O mid abd pain worse this am 8/10   Was just D/C from hospital on Friday for SBO  )        Medical Problems             Resolved Problems  Date Reviewed: 10/10/2022   None                 Consultations During Hospital Stay:  None    Procedures Performed:     · None    Significant Findings / Test Results:     · As below  Results from last 7 days   Lab Units 10/10/22  0520 10/09/22  1614 10/07/22  0622   WBC Thousand/uL 12 39* 18 11* 19 28*   HEMOGLOBIN g/dL 9 9* 12 2 10 5*   PLATELETS Thousands/uL 355 452* 467*     Results from last 7 days   Lab Units 10/10/22  0520 10/09/22  1614 10/07/22  0622 10/06/22  0601   SODIUM mmol/L 138 139 135 136   POTASSIUM mmol/L 3 6 3 1* 4 0 3 8   CHLORIDE mmol/L 103 101 101 103   CO2 mmol/L 30 30 29 30   BUN mg/dL 13 19 11 10   CREATININE mg/dL 0 86 1 09 0 83 0 78   CALCIUM mg/dL 8 3 8 4 8 4 8 3   TOTAL BILIRUBIN mg/dL  --  0 89 0 51 0 49   ALK PHOS U/L  --  63 50 54   ALT U/L  --  82* 23 32   AST U/L  --  34 10 6             No results found for: HGBA1C          Blood Culture:   Lab Results   Component Value Date    BLOODCX No Growth After 5 Days  09/30/2022    BLOODCX No Growth After 5 Days  09/30/2022    BLOODCX No Growth After 5 Days  08/21/2022    BLOODCX No Growth After 5 Days  08/21/2022    BLOODCX No Growth After 5 Days  03/06/2022    BLOODCX No Growth After 5 Days  03/06/2022     Urine Culture: No results found for: URINECX  Sputum Culture: No components found for: SPUTUMCX  Wound Culture: No results found for: WOUNDCULT     Imaging  CT abdomen pelvis with contrast   Final Result by Olivia Salgado MD (10/09 1823)      Stable mild thickening of the distal small bowel/neorectum with adjacent fluid  Remainder of the bowel is unremarkable  Workstation performed: [id number]               Incidental Findings:   · None     Test Results Pending at Discharge (will require follow up):    · None     Outpatient Tests Requested:  · None    Complications:  None    Reason for Admission:  Abdominal pain    Hospital Course:     PER HPI: Tc Pearce is a 32 y o  male patient with a PMH of ulcerative colitis, ileal pouchitis, bowel obstructions, C diff colitis, anxiety, ankylosing spondylitis who originally presented to the hospital on 10/9/2022 due to recurrent abdominal pain  History of ulcerative colitis, status post partial colectomy and ileal pouch formation with stricture of rectum  Patient was recently hospitalized for small-bowel obstruction  Due to active disease noted in small-bowel concern for misdiagnosis with actually Crohn's disease per GI note  Patient was discharged on prolonged prednisone taper, and continued on Flagyl for history of pouchitis  Patient is also status post pouchoscopy on 10/4 which was negative for stricture or inflammation  Patient received IV fluids and pain control while here  Patient is being transferred to [facility] under care of his surgeon  Hospital course:   Please see above list of diagnoses and related plan for additional information  Condition at Discharge: fair       Discharge instructions/Information to patient and family:   See after visit summary for information provided to patient and family  Provisions for Follow-Up Care:  See after visit summary for information related to follow-up care and any pertinent home health orders  Disposition:     Other: Transferred to [facility]    Planned Readmission:  NA     Discharge Statement:  I spent 15 minutes discharging the patient  This time was spent on the day of discharge  I had direct contact with the patient on the day of discharge  Greater than 50% of the total time was spent examining patient, answering all patient questions, arranging and discussing plan of care with patient as well as directly providing post-discharge instructions  Additional time then spent on discharge activities  Discharge Medications:  See after visit summary for reconciled discharge medications provided to patient and family        ** Please Note: This note has been constructed using a voice recognition system **

## 2022-10-11 NOTE — ASSESSMENT & PLAN NOTE
Patient presents to ER with abdominal pain, ED discussed with surgeon and accepted for transfer to [facility] - accepting ludmila Valiente   Patient was admitted due to no bed availabiity at [facility] last night  · Pt recently discharged home 10/7 on flagyl, steroids, was to f/u with IBD specialist/[facility] about starting  therapy  · CT a/p: Stable mild thickening of the distal small bowel/neorectum with adjacent fluid  · Continue flagyl  · Continue prednisone 40mg, taper down by 5mg weekly  · Pain medication prn  · Clear liquids --> advanced to full liquids  · Received IVF  · Patient is being transferred to [facility]

## 2022-10-11 NOTE — NURSING NOTE
Patient is alert and oriented x 4 with stable condition, discharged to [facility] via Person transport  Report was given by previous nurse in charge to [facility]  Patient medical records and  all patient belongings were given to transporter in charge

## 2022-10-12 ENCOUNTER — PATIENT OUTREACH (OUTPATIENT)
Dept: FAMILY MEDICINE CLINIC | Facility: CLINIC | Age: 27
End: 2022-10-12

## 2022-10-12 NOTE — PROGRESS NOTES
Patient was re-referred for CCM as a [employer] on 10/4/22  Patient declined follow up calls on 8/10/22 at initial CCM referral     No episode open at this time  ADT Alert received for patient discharge on 10/10/22  Chart was reviewed and patient was discharged to [facility] for care with his surgeon Dr Roderick Valle  OPCM RN to follow at dc

## 2022-10-27 ENCOUNTER — PATIENT OUTREACH (OUTPATIENT)
Dept: FAMILY MEDICINE CLINIC | Facility: CLINIC | Age: 27
End: 2022-10-27

## 2022-10-27 NOTE — PROGRESS NOTES
Chart was reviewed and this [facility] RN called patient and introduced self and the role of OPCM RN and the complex case management  Patient states he was just discharged from [address] yesterday  He states he was IP for almost a month due to complications from surgery and ended up needing 2 emergency surgeries after  Patient states he has all his medications and has no issues obtaining or affording them and he has transportation to all appointments  This  RPCN called patient a little over a month ago and he declined follow up  Another referral was received for [employer]  Patient declines the need for any services or follow up again at this time  This OPCM RN removed self from care team and no ccm episode opened

## 2022-10-28 ENCOUNTER — APPOINTMENT (EMERGENCY)
Dept: RADIOLOGY | Facility: HOSPITAL | Age: 27
End: 2022-10-28
Payer: COMMERCIAL

## 2022-10-28 ENCOUNTER — HOSPITAL ENCOUNTER (EMERGENCY)
Facility: HOSPITAL | Age: 27
Discharge: ANOTHER HOSPITAL | End: 2022-10-28
Attending: EMERGENCY MEDICINE
Payer: COMMERCIAL

## 2022-10-28 VITALS
WEIGHT: 182 LBS | BODY MASS INDEX: 26.96 KG/M2 | RESPIRATION RATE: 17 BRPM | HEIGHT: 69 IN | SYSTOLIC BLOOD PRESSURE: 139 MMHG | OXYGEN SATURATION: 98 % | HEART RATE: 128 BPM | TEMPERATURE: 98.3 F | DIASTOLIC BLOOD PRESSURE: 92 MMHG

## 2022-10-28 DIAGNOSIS — A41.9 SEPSIS (HCC): ICD-10-CM

## 2022-10-28 DIAGNOSIS — L02.211 ABDOMINAL WALL ABSCESS: Primary | ICD-10-CM

## 2022-10-28 LAB
ALBUMIN SERPL BCP-MCNC: 3.5 G/DL (ref 3.5–5)
ALP SERPL-CCNC: 83 U/L (ref 46–116)
ALT SERPL W P-5'-P-CCNC: 24 U/L (ref 12–78)
AMMONIA PLAS-SCNC: 10 UMOL/L (ref 11–35)
ANION GAP SERPL CALCULATED.3IONS-SCNC: 10 MMOL/L (ref 4–13)
APTT PPP: 25 SECONDS (ref 23–37)
AST SERPL W P-5'-P-CCNC: 12 U/L (ref 5–45)
BASOPHILS # BLD MANUAL: 0 THOUSAND/UL (ref 0–0.1)
BASOPHILS NFR MAR MANUAL: 0 % (ref 0–1)
BILIRUB SERPL-MCNC: 0.66 MG/DL (ref 0.2–1)
BILIRUB UR QL STRIP: NEGATIVE
BUN SERPL-MCNC: 12 MG/DL (ref 5–25)
CALCIUM SERPL-MCNC: 9.7 MG/DL (ref 8.3–10.1)
CHLORIDE SERPL-SCNC: 100 MMOL/L (ref 96–108)
CLARITY UR: CLEAR
CO2 SERPL-SCNC: 27 MMOL/L (ref 21–32)
COLOR UR: YELLOW
CREAT SERPL-MCNC: 1.3 MG/DL (ref 0.6–1.3)
EOSINOPHIL # BLD MANUAL: 0 THOUSAND/UL (ref 0–0.4)
EOSINOPHIL NFR BLD MANUAL: 0 % (ref 0–6)
ERYTHROCYTE [DISTWIDTH] IN BLOOD BY AUTOMATED COUNT: 20.3 % (ref 11.6–15.1)
FLUAV RNA RESP QL NAA+PROBE: NEGATIVE
FLUBV RNA RESP QL NAA+PROBE: NEGATIVE
GFR SERPL CREATININE-BSD FRML MDRD: 73 ML/MIN/1.73SQ M
GLUCOSE SERPL-MCNC: 126 MG/DL (ref 65–140)
GLUCOSE UR STRIP-MCNC: NEGATIVE MG/DL
HCT VFR BLD AUTO: 40.6 % (ref 36.5–49.3)
HGB BLD-MCNC: 12.1 G/DL (ref 12–17)
HGB UR QL STRIP.AUTO: NEGATIVE
INR PPP: 0.95 (ref 0.84–1.19)
KETONES UR STRIP-MCNC: NEGATIVE MG/DL
LACTATE SERPL-SCNC: 2.2 MMOL/L (ref 0.5–2)
LACTATE SERPL-SCNC: 3.1 MMOL/L (ref 0.5–2)
LEUKOCYTE ESTERASE UR QL STRIP: NEGATIVE
LG PLATELETS BLD QL SMEAR: PRESENT
LIPASE SERPL-CCNC: 106 U/L (ref 73–393)
LYMPHOCYTES # BLD AUTO: 12 % (ref 14–44)
LYMPHOCYTES # BLD AUTO: 2.59 THOUSAND/UL (ref 0.6–4.47)
MAGNESIUM SERPL-MCNC: 1.8 MG/DL (ref 1.6–2.6)
MCH RBC QN AUTO: 19 PG (ref 26.8–34.3)
MCHC RBC AUTO-ENTMCNC: 29.8 G/DL (ref 31.4–37.4)
MCV RBC AUTO: 64 FL (ref 82–98)
METAMYELOCYTES NFR BLD MANUAL: 1 % (ref 0–1)
MONOCYTES # BLD AUTO: 0.43 THOUSAND/UL (ref 0–1.22)
MONOCYTES NFR BLD: 2 % (ref 4–12)
MYELOCYTES NFR BLD MANUAL: 1 % (ref 0–1)
NEUTROPHILS # BLD MANUAL: 18.1 THOUSAND/UL (ref 1.85–7.62)
NEUTS BAND NFR BLD MANUAL: 3 % (ref 0–8)
NEUTS SEG NFR BLD AUTO: 81 % (ref 43–75)
NITRITE UR QL STRIP: NEGATIVE
PH UR STRIP.AUTO: 7 [PH]
PHOSPHATE SERPL-MCNC: 3.5 MG/DL (ref 2.7–4.5)
PLATELET # BLD AUTO: 840 THOUSANDS/UL (ref 149–390)
PLATELET BLD QL SMEAR: ABNORMAL
PMV BLD AUTO: 8 FL (ref 8.9–12.7)
POLYCHROMASIA BLD QL SMEAR: PRESENT
POTASSIUM SERPL-SCNC: 4 MMOL/L (ref 3.5–5.3)
PROCALCITONIN SERPL-MCNC: 0.14 NG/ML
PROT SERPL-MCNC: 7.7 G/DL (ref 6.4–8.4)
PROT UR STRIP-MCNC: NEGATIVE MG/DL
PROTHROMBIN TIME: 12.8 SECONDS (ref 11.6–14.5)
RBC # BLD AUTO: 6.38 MILLION/UL (ref 3.88–5.62)
RBC MORPH BLD: PRESENT
RSV RNA RESP QL NAA+PROBE: NEGATIVE
SARS-COV-2 RNA RESP QL NAA+PROBE: NEGATIVE
SODIUM SERPL-SCNC: 137 MMOL/L (ref 135–147)
SP GR UR STRIP.AUTO: 1.01 (ref 1–1.03)
TARGETS BLD QL SMEAR: PRESENT
UROBILINOGEN UR QL STRIP.AUTO: 0.2 E.U./DL
WBC # BLD AUTO: 21.55 THOUSAND/UL (ref 4.31–10.16)

## 2022-10-28 PROCEDURE — 85027 COMPLETE CBC AUTOMATED: CPT | Performed by: PHYSICIAN ASSISTANT

## 2022-10-28 PROCEDURE — 83690 ASSAY OF LIPASE: CPT | Performed by: PHYSICIAN ASSISTANT

## 2022-10-28 PROCEDURE — 0241U HB NFCT DS VIR RESP RNA 4 TRGT: CPT | Performed by: PHYSICIAN ASSISTANT

## 2022-10-28 PROCEDURE — 87493 C DIFF AMPLIFIED PROBE: CPT | Performed by: PHYSICIAN ASSISTANT

## 2022-10-28 PROCEDURE — 36415 COLL VENOUS BLD VENIPUNCTURE: CPT | Performed by: PHYSICIAN ASSISTANT

## 2022-10-28 PROCEDURE — 83605 ASSAY OF LACTIC ACID: CPT | Performed by: PHYSICIAN ASSISTANT

## 2022-10-28 PROCEDURE — 85007 BL SMEAR W/DIFF WBC COUNT: CPT | Performed by: PHYSICIAN ASSISTANT

## 2022-10-28 PROCEDURE — 83735 ASSAY OF MAGNESIUM: CPT | Performed by: PHYSICIAN ASSISTANT

## 2022-10-28 PROCEDURE — 87040 BLOOD CULTURE FOR BACTERIA: CPT | Performed by: PHYSICIAN ASSISTANT

## 2022-10-28 PROCEDURE — 93005 ELECTROCARDIOGRAM TRACING: CPT

## 2022-10-28 PROCEDURE — 85730 THROMBOPLASTIN TIME PARTIAL: CPT | Performed by: PHYSICIAN ASSISTANT

## 2022-10-28 PROCEDURE — 80053 COMPREHEN METABOLIC PANEL: CPT | Performed by: PHYSICIAN ASSISTANT

## 2022-10-28 PROCEDURE — 84145 PROCALCITONIN (PCT): CPT | Performed by: PHYSICIAN ASSISTANT

## 2022-10-28 PROCEDURE — 84100 ASSAY OF PHOSPHORUS: CPT | Performed by: PHYSICIAN ASSISTANT

## 2022-10-28 PROCEDURE — 81003 URINALYSIS AUTO W/O SCOPE: CPT | Performed by: PHYSICIAN ASSISTANT

## 2022-10-28 PROCEDURE — 82140 ASSAY OF AMMONIA: CPT | Performed by: PHYSICIAN ASSISTANT

## 2022-10-28 PROCEDURE — 74177 CT ABD & PELVIS W/CONTRAST: CPT

## 2022-10-28 PROCEDURE — 85610 PROTHROMBIN TIME: CPT | Performed by: PHYSICIAN ASSISTANT

## 2022-10-28 RX ORDER — LEVOFLOXACIN 5 MG/ML
750 INJECTION, SOLUTION INTRAVENOUS ONCE
Status: COMPLETED | OUTPATIENT
Start: 2022-10-28 | End: 2022-10-28

## 2022-10-28 RX ORDER — SODIUM CHLORIDE, SODIUM LACTATE, POTASSIUM CHLORIDE, CALCIUM CHLORIDE 600; 310; 30; 20 MG/100ML; MG/100ML; MG/100ML; MG/100ML
125 INJECTION, SOLUTION INTRAVENOUS CONTINUOUS
Status: DISCONTINUED | OUTPATIENT
Start: 2022-10-28 | End: 2022-10-29 | Stop reason: HOSPADM

## 2022-10-28 RX ORDER — HYDROMORPHONE HCL/PF 1 MG/ML
1 SYRINGE (ML) INJECTION ONCE
Status: COMPLETED | OUTPATIENT
Start: 2022-10-28 | End: 2022-10-28

## 2022-10-28 RX ORDER — HYDROMORPHONE HCL/PF 1 MG/ML
1 SYRINGE (ML) INJECTION EVERY 2 HOUR PRN
Status: DISCONTINUED | OUTPATIENT
Start: 2022-10-28 | End: 2022-10-29 | Stop reason: HOSPADM

## 2022-10-28 RX ORDER — DIPHENHYDRAMINE HYDROCHLORIDE 50 MG/ML
25 INJECTION INTRAMUSCULAR; INTRAVENOUS ONCE
Status: COMPLETED | OUTPATIENT
Start: 2022-10-28 | End: 2022-10-28

## 2022-10-28 RX ADMIN — SODIUM CHLORIDE, SODIUM LACTATE, POTASSIUM CHLORIDE, AND CALCIUM CHLORIDE 125 ML/HR: .6; .31; .03; .02 INJECTION, SOLUTION INTRAVENOUS at 19:23

## 2022-10-28 RX ADMIN — HYDROMORPHONE HYDROCHLORIDE 1 MG: 1 INJECTION, SOLUTION INTRAMUSCULAR; INTRAVENOUS; SUBCUTANEOUS at 14:07

## 2022-10-28 RX ADMIN — HYDROMORPHONE HYDROCHLORIDE 1 MG: 1 INJECTION, SOLUTION INTRAMUSCULAR; INTRAVENOUS; SUBCUTANEOUS at 18:08

## 2022-10-28 RX ADMIN — HYDROMORPHONE HYDROCHLORIDE 1 MG: 1 INJECTION, SOLUTION INTRAMUSCULAR; INTRAVENOUS; SUBCUTANEOUS at 12:02

## 2022-10-28 RX ADMIN — IOHEXOL 100 ML: 350 INJECTION, SOLUTION INTRAVENOUS at 12:48

## 2022-10-28 RX ADMIN — LEVOFLOXACIN 750 MG: 750 INJECTION, SOLUTION INTRAVENOUS at 12:06

## 2022-10-28 RX ADMIN — SODIUM CHLORIDE, SODIUM LACTATE, POTASSIUM CHLORIDE, AND CALCIUM CHLORIDE 1000 ML: .6; .31; .03; .02 INJECTION, SOLUTION INTRAVENOUS at 12:04

## 2022-10-28 RX ADMIN — SODIUM CHLORIDE, SODIUM LACTATE, POTASSIUM CHLORIDE, AND CALCIUM CHLORIDE 500 ML: .6; .31; .03; .02 INJECTION, SOLUTION INTRAVENOUS at 17:09

## 2022-10-28 RX ADMIN — DIPHENHYDRAMINE HYDROCHLORIDE 25 MG: 50 INJECTION, SOLUTION INTRAMUSCULAR; INTRAVENOUS at 12:26

## 2022-10-28 RX ADMIN — HYDROMORPHONE HYDROCHLORIDE 1 MG: 1 INJECTION, SOLUTION INTRAMUSCULAR; INTRAVENOUS; SUBCUTANEOUS at 16:08

## 2022-10-28 RX ADMIN — SODIUM CHLORIDE, SODIUM LACTATE, POTASSIUM CHLORIDE, AND CALCIUM CHLORIDE 1000 ML: .6; .31; .03; .02 INJECTION, SOLUTION INTRAVENOUS at 15:03

## 2022-10-28 RX ADMIN — HYDROMORPHONE HYDROCHLORIDE 1 MG: 1 INJECTION, SOLUTION INTRAMUSCULAR; INTRAVENOUS; SUBCUTANEOUS at 20:41

## 2022-10-28 RX ADMIN — VANCOMYCIN HYDROCHLORIDE 1250 MG: 10 INJECTION, POWDER, LYOPHILIZED, FOR SOLUTION INTRAVENOUS at 13:36

## 2022-10-28 NOTE — EMTALA/ACUTE CARE TRANSFER
[address] [address]  Dept: [phone]      EMTALA TRANSFER CONSENT    NAME Delaney Toledo                                         1995                              MRN [id number]    I have been informed of my rights regarding examination, treatment, and transfer   by Dr Maru Snell DO    Benefits: Specialized equipment and/or services available at the receiving facility (Include comment)________________________    Risks: Potential for delay in receiving treatment, Potential deterioration of medical condition, Increased discomfort during transfer, Loss of IV, Possible worsening of condition or death during transfer      Consent for Transfer:  I acknowledge that my medical condition has been evaluated and explained to me by the emergency department physician or other qualified medical person and/or my attending physician, who has recommended that I be transferred to the service of  Accepting Physician: Dr Shonna Yusuf at [address] Name, [address] : [address]  The above potential benefits of such transfer, the potential risks associated with such transfer, and the probable risks of not being transferred have been explained to me, and I fully understand them  The doctor has explained that, in my case, the benefits of transfer outweigh the risks  I agree to be transferred  I authorize the performance of emergency medical procedures and treatments upon me in both transit and upon arrival at the receiving facility  Additionally, I authorize the release of any and all medical records to the receiving facility and request they be transported with me, if possible  I understand that the safest mode of transportation during a medical emergency is an ambulance and that the Hospital advocates the use of this mode of transport   Risks of traveling to the receiving facility by car, including absence of medical control, life sustaining equipment, such as oxygen, and medical personnel has been explained to me and I fully understand them  ([address]OW)  [  ]  I consent to the stated transfer and to be transported by ambulance/helicopter  [  ]  I consent to the stated transfer, but refuse transportation by ambulance and accept full responsibility for my transportation by car  I understand the risks of non-ambulance transfers and I exonerate the Hospital and its staff from any deterioration in my condition that results from this refusal     X___________________________________________    DATE  10/28/22  TIME________  Signature of patient or legally responsible individual signing on patient behalf           RELATIONSHIP TO PATIENT_________________________          Provider Certification    NAME Alejandro Apple                                        [facility] 1995                              MRN [id number]    A medical screening exam was performed on the above named patient  Based on the examination:    Condition Necessitating Transfer The primary encounter diagnosis was Abdominal wall abscess  A diagnosis of Sepsis ([facility] [address] ) was also pertinent to this visit      Patient Condition: The patient has been stabilized such that within reasonable medical probability, no material deterioration of the patient condition or the condition of the unborn child(gonzalez) is likely to result from the transfer    Reason for Transfer: Level of Care needed not available at this facility    Transfer Requirements: [address]   · Space available and qualified personnel available for treatment as acknowledged by    · Agreed to accept transfer and to provide appropriate medical treatment as acknowledged by       Dr Tyler Melton  · Appropriate medical records of the examination and treatment of the patient are provided at the time of transfer   [address], [address] _______  · Transfer will be performed by qualified personnel from    and appropriate transfer equipment as required, including the use of necessary and appropriate life support measures  Provider Certification: I have examined the patient and explained the following risks and benefits of being transferred/refusing transfer to the patient/family:  General risk, such as traffic hazards, adverse weather conditions, rough terrain or turbulence, possible failure of equipment (including vehicle or aircraft), or consequences of actions of persons outside the control of the transport personnel      Based on these reasonable risks and benefits to the patient and/or the unborn child(gonzalez), and based upon the information available at the time of the patient’s examination, I certify that the medical benefits reasonably to be expected from the provision of appropriate medical treatments at another medical facility outweigh the increasing risks, if any, to the individual’s medical condition, and in the case of labor to the unborn child, from effecting the transfer      X____________________________________________ DATE 10/28/22        TIME_______      ORIGINAL - SEND TO MEDICAL RECORDS   COPY - SEND WITH PATIENT DURING TRANSFER

## 2022-10-28 NOTE — ED PROVIDER NOTES
History  Chief Complaint   Patient presents with   • Abdominal Pain     Had emergency surgery x 2 10/12 and 10/15 at [facility], d/c Wednesday and abd pain started about 3am and is getting worse     Pt is a 33 yo M with PMH of ulcerative colitis requiring colectomy and ileostomy, who underwent reversal at [facility]  Earlier this month  His hospital course was complicated by emergent exlap x 2, bacterial peritonitis and was discharged on Zyvox and Levoquin two days ago  Yesterday, patient began experiencing worsening abdominal pain and low grade fever prompting him to present to [facility] for further evaluation  He states that his ostomy output has been appropriate without changes, he denies dysuria, chest pain, constipation, cough, diarrhea, hematemesis, hematochezia, hematuria, melena, sob or vomiting  He endorses low grade fever, fatigue, and anorexia  Evaluation demonstrates elevated wbc at 21 5, lactic acidemia at 3 3, and sinus tachycardia  CT shows possible abscess under the suture line, as well as inflammatory changes that may be related to post op state  Pt was given broad spectrum antibiotics (Levoquin and Vancomycin) , IV fluids (LRx 2L) were given    PT is to be transferred to [facility] under the care of the general surgical team         History provided by:  Patient  Abdominal Pain  Pain location:  Generalized  Pain quality: gnawing, sharp, shooting, stabbing and stiffness    Pain radiates to:  Does not radiate  Pain severity:  Severe  Onset quality:  Gradual  Duration:  1 day  Timing:  Constant  Progression:  Worsening  Chronicity:  New  Context: awakening from sleep, previous surgery and recent illness    Context: not alcohol use, not diet changes, not eating, not laxative use, not medication withdrawal, not recent sexual activity, not recent travel, not retching, not sick contacts, not suspicious food intake and not trauma    Relieved by:  Nothing  Worsened by:  Nothing  Ineffective treatments: None tried  Associated symptoms: anorexia    Associated symptoms: no belching, no chest pain, no chills, no constipation, no cough, no diarrhea, no dysuria, no fatigue, no fever, no flatus, no hematemesis, no hematochezia, no hematuria, no melena, no nausea, no shortness of breath, no sore throat and no vomiting    Risk factors: multiple surgeries and recent hospitalization    Risk factors: no alcohol abuse, no aspirin use, not elderly, no NSAID use and not obese        Prior to Admission Medications   Prescriptions Last Dose Informant Patient Reported? Taking? DULoxetine (CYMBALTA) 60 mg delayed release capsule   No No   Sig: Take 1 capsule (60 mg total) by mouth daily   pantoprazole (PROTONIX) 40 mg tablet   No No   Sig: Take 1 tablet (40 mg total) by mouth 2 (two) times a day   predniSONE 10 mg tablet   No No   Sig: Take 4 tablets (40 mg total) by mouth daily for 7 days, THEN 3 5 tablets (35 mg total) daily for 7 days, THEN 3 tablets (30 mg total) daily for 7 days, THEN 2 5 tablets (25 mg total) daily for 7 days, THEN 2 tablets (20 mg total) daily for 7 days, THEN 1 5 tablets (15 mg total) daily for 7 days, THEN 1 tablet (10 mg total) daily for 7 days, THEN 0 5 tablets (5 mg total) daily for 7 days  saccharomyces boulardii (FLORASTOR) 250 mg capsule   No No   Sig: Take 1 capsule (250 mg total) by mouth 2 (two) times a day      Facility-Administered Medications: None       Past Medical History:   Diagnosis Date   • Ankylosing spondylitis (HCC)    • Anxiety    • Bowel obstruction (HCC)    • Clostridium difficile colitis 9/13/2018   • Colitis    • 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[facility] [address] ) 9/13/2018   • Pancreatitis    • Ulcerative colitis ([facility] [address] )        Past Surgical History:   Procedure Laterality Date   • COLECTOMY TOTAL      with ileal pouch and anastemosis   • IR PICC PLACEMENT SINGLE LUMEN  3/1/2022   • TOTAL COLECTOMY         History reviewed  No pertinent family history    I have reviewed and agree with the history as documented  E-Cigarette/Vaping   • E-Cigarette Use Never User      E-Cigarette/Vaping Substances   • Nicotine No    • THC No    • CBD No    • Flavoring No    • Other No    • Unknown No      Social History     Tobacco Use   • Smoking status: Never Smoker   • Smokeless tobacco: Never Used   Vaping Use   • Vaping Use: Never used   Substance Use Topics   • Alcohol use: Not Currently     Comment: pt states 5 a month/socially   • Drug use: No       Review of Systems   Constitutional: Negative for chills, fatigue and fever  HENT: Negative for ear pain and sore throat  Eyes: Negative for pain and visual disturbance  Respiratory: Negative for cough and shortness of breath  Cardiovascular: Negative for chest pain and palpitations  Gastrointestinal: Positive for abdominal pain and anorexia  Negative for constipation, diarrhea, flatus, hematemesis, hematochezia, melena, nausea and vomiting  Endocrine: Negative  Genitourinary: Negative for dysuria and hematuria  Musculoskeletal: Negative for arthralgias and back pain  Skin: Negative for color change and rash  Allergic/Immunologic: Negative  Neurological: Negative  Negative for seizures and syncope  Hematological: Negative  Psychiatric/Behavioral: Negative  All other systems reviewed and are negative  Physical Exam  Physical Exam  Vitals and nursing note reviewed  Constitutional:       General: He is not in acute distress  Appearance: He is well-developed and normal weight  He is ill-appearing and toxic-appearing  HENT:      Head: Normocephalic and atraumatic  Mouth/Throat:      Mouth: Mucous membranes are moist       Pharynx: No pharyngeal swelling  Eyes:      General: No scleral icterus  Extraocular Movements: Extraocular movements intact  Conjunctiva/sclera: Conjunctivae normal       Pupils: Pupils are equal, round, and reactive to light  Cardiovascular:      Rate and Rhythm: Normal rate and regular rhythm  Heart sounds: Normal heart sounds  No murmur heard  Pulmonary:      Effort: Pulmonary effort is normal  No respiratory distress  Breath sounds: Normal breath sounds  Abdominal:      General: Abdomen is flat and scaphoid  A surgical scar is present  There is no distension  Palpations: Abdomen is soft  Tenderness: There is generalized abdominal tenderness  There is guarding and rebound  Comments: Ostomy in situ - stoma is pink and well perfused   Musculoskeletal:         General: Normal range of motion  Cervical back: Neck supple  Right lower leg: No edema  Left lower leg: No edema  Skin:     General: Skin is warm and dry  Capillary Refill: Capillary refill takes less than 2 seconds  Neurological:      General: No focal deficit present  Mental Status: He is alert and oriented to person, place, and time  Cranial Nerves: No cranial nerve deficit           Vital Signs  ED Triage Vitals   Temperature Pulse Respirations Blood Pressure SpO2   10/28/22 1119 10/28/22 1119 10/28/22 1119 10/28/22 1119 10/28/22 1119   99 2 °F (37 3 °C) (!) 136 20 124/66 98 %      Temp Source Heart Rate Source Patient Position - Orthostatic VS BP Location FiO2 (%)   10/28/22 1150 10/28/22 1217 -- -- --   Oral Monitor         Pain Score       10/28/22 1119       10 - Worst Possible Pain           Vitals:    10/28/22 1314 10/28/22 1315 10/28/22 1415 10/28/22 1507   BP: 146/95 146/95 128/69 122/66   Pulse: (!) 139 (!) 132 (!) 130 (!) 123         Visual Acuity      ED Medications  Medications   HYDROmorphone (DILAUDID) injection 1 mg (1 mg Intravenous Given 10/28/22 1608)   lactated ringers bolus 500 mL (has no administration in time range)   lactated ringers bolus 1,000 mL (0 mL Intravenous Stopped 10/28/22 1504)   HYDROmorphone (DILAUDID) injection 1 mg (1 mg Intravenous Given 10/28/22 1202)   levofloxacin (LEVAQUIN) IVPB (premix in dextrose) 750 mg 150 mL (0 mg Intravenous Stopped 10/28/22 1406)   vancomycin (VANCOCIN) 1,250 mg in sodium chloride 0 9 % 250 mL IVPB (0 mg/kg × 82 6 kg Intravenous Stopped 10/28/22 1510)   diphenhydrAMINE (BENADRYL) injection 25 mg (25 mg Intravenous Given 10/28/22 1226)   iohexol (OMNIPAQUE) 350 MG/ML injection (SINGLE-DOSE) 100 mL (100 mL Intravenous Given 10/28/22 1248)   lactated ringers bolus 1,000 mL (1,000 mL Intravenous New Bag 10/28/22 1503)       Diagnostic Studies  Results Reviewed     Procedure Component Value Units Date/Time    COVID/FLU/RSV [[id number]]  (Normal) Collected: 10/28/22 1601    Lab Status: Final result Specimen: Nares from Nose Updated: 10/28/22 1648     SARS-CoV-2 Negative     INFLUENZA A PCR Negative     INFLUENZA B PCR Negative     RSV PCR Negative    Narrative:      FOR PEDIATRIC PATIENTS - copy/paste COVID Guidelines URL to browser: [email or web address]  [employer]    SARS-CoV-2 assay is a Nucleic Acid Amplification assay intended for the  qualitative detection of nucleic acid from SARS-CoV-2 in nasopharyngeal  swabs  Results are for the presumptive identification of SARS-CoV-2 RNA  Positive results are indicative of infection with SARS-CoV-2, the virus  causing COVID-19, but do not rule out bacterial infection or co-infection  with other viruses  Laboratories within the [place] and its  territories are required to report all positive results to the appropriate  public health authorities  Negative results do not preclude SARS-CoV-2  infection and should not be used as the sole basis for treatment or other  patient management decisions  Negative results must be combined with  clinical observations, patient history, and epidemiological information  This test has not been FDA cleared or approved  This test has been authorized by FDA under an Emergency Use Authorization  (EUA)   This test is only authorized for the duration of time the  declaration that circumstances exist justifying the authorization of the  emergency use of an in vitro diagnostic tests for detection of SARS-CoV-2  virus and/or diagnosis of COVID-19 infection under section 564(b)(1) of  the Act, 21 U  S C  155NYR-5(S)(7), unless the authorization is terminated  or revoked sooner  The test has been validated but independent review by FDA  and CLIA is pending  Test performed using Generate GeneXpert: This RT-PCR assay targets N2,  a region unique to SARS-CoV-2  A conserved region in the E-gene was chosen  for pan-Sarbecovirus detection which includes SARS-CoV-2  According to CMS-2020-01-R, this platform meets the definition of high-throughput technology  UA w Reflex to Microscopic w Reflex to Culture [[id number]] Collected: 10/28/22 1509    Lab Status: Final result Specimen: Urine, Other Updated: 10/28/22 1519     Color, UA Yellow     Clarity, UA Clear     Specific Gravity, UA 1 010     pH, UA 7 0     Leukocytes, UA Negative     Nitrite, UA Negative     Protein, UA Negative mg/dl      Glucose, UA Negative mg/dl      Ketones, UA Negative mg/dl      Urobilinogen, UA 0 2 E U /dl      Bilirubin, UA Negative     Occult Blood, UA Negative    Clostridium difficile toxin by PCR with EIA [[id number]] Collected: 10/28/22 1510    Lab Status: In process Specimen: Stool from Per Stoma Updated: 10/28/22 1516    Blood culture #1 [[id number]] Collected: 10/28/22 1147    Lab Status: Preliminary result Specimen: Blood from Arm, Right Updated: 10/28/22 1503     Blood Culture Received in Microbiology Lab  Culture in Progress  Blood culture #2 [[id number]] Collected: 10/28/22 1147    Lab Status: Preliminary result Specimen: Blood from Arm, Left Updated: 10/28/22 1503     Blood Culture Received in Microbiology Lab  Culture in Progress      Lactic acid 2 Hours [[id number]]  (Abnormal) Collected: 10/28/22 1410    Lab Status: Final result Specimen: Blood from Line, Venous Updated: 10/28/22 1450     LACTIC ACID 2 2 mmol/L     Narrative:      Result may be elevated if tourniquet was used during collection  Lactic acid [[id number]]  (Abnormal) Collected: 10/28/22 1155    Lab Status: Final result Specimen: Blood from Arm, Right Updated: 10/28/22 1229     LACTIC ACID 3 1 mmol/L     Narrative:      Result may be elevated if tourniquet was used during collection      Procalcitonin [[id number]]  (Normal) Collected: 10/28/22 1155    Lab Status: Final result Specimen: Blood from Arm, Right Updated: 10/28/22 1228     Procalcitonin 0 14 ng/ml     Comprehensive metabolic panel [[id number]] Collected: 10/28/22 1147    Lab Status: Final result Specimen: Blood from Arm, Right Updated: 10/28/22 1220     Sodium 137 mmol/L      Potassium 4 0 mmol/L      Chloride 100 mmol/L      CO2 27 mmol/L      ANION GAP 10 mmol/L      BUN 12 mg/dL      Creatinine 1 30 mg/dL      Glucose 126 mg/dL      Calcium 9 7 mg/dL      AST 12 U/L      ALT 24 U/L      Alkaline Phosphatase 83 U/L      Total Protein 7 7 g/dL      Albumin 3 5 g/dL      Total Bilirubin 0 66 mg/dL      eGFR 73 ml/min/1 73sq m     Narrative:      [place] guidelines for Chronic Kidney Disease (CKD):   •  Stage 1 with normal or high GFR (GFR > 90 mL/min/1 73 square meters)  •  Stage 2 Mild CKD (GFR = 60-89 mL/min/1 73 square meters)  •  Stage 3A Moderate CKD (GFR = 45-59 mL/min/1 73 square meters)  •  Stage 3B Moderate CKD (GFR = 30-44 mL/min/1 73 square meters)  •  Stage 4 Severe CKD (GFR = 15-29 mL/min/1 73 square meters)  •  Stage 5 End Stage CKD (GFR <15 mL/min/1 73 square meters)  Note: GFR calculation is accurate only with a steady state creatinine    Phosphorus [[id number]]  (Normal) Collected: 10/28/22 1147    Lab Status: Final result Specimen: Blood from Arm, Right Updated: 10/28/22 1220     Phosphorus 3 5 mg/dL     Magnesium [[id number]]  (Normal) Collected: 10/28/22 1147    Lab Status: Final result Specimen: Blood from Arm, Right Updated: 10/28/22 1220     Magnesium 1 8 mg/dL     Lipase [[id number]]  (Normal) Collected: 10/28/22 1147    Lab Status: Final result Specimen: Blood from Arm, Right Updated: 10/28/22 1220     Lipase 106 u/L     CBC and differential [[id number]]  (Abnormal) Collected: 10/28/22 1147    Lab Status: Final result Specimen: Blood from Arm, Right Updated: 10/28/22 1218     WBC 21 55 Thousand/uL      RBC 6 38 Million/uL      Hemoglobin 12 1 g/dL      Hematocrit 40 6 %      MCV 64 fL      MCH 19 0 pg      MCHC 29 8 g/dL      RDW 20 3 %      MPV 8 0 fL      Platelets 976 Thousands/uL     Narrative: This is an appended report  These results have been appended to a previously verified report      Manual Differential(PHLEBS Do Not Order) [[id number]]  (Abnormal) Collected: 10/28/22 1147    Lab Status: Final result Specimen: Blood from Arm, Right Updated: 10/28/22 1218     Segmented % 81 %      Bands % 3 %      Lymphocytes % 12 %      Monocytes % 2 %      Eosinophils, % 0 %      Basophils % 0 %      Metamyelocytes% 1 %      Myelocytes % 1 %      Absolute Neutrophils 18 10 Thousand/uL      Lymphocytes Absolute 2 59 Thousand/uL      Monocytes Absolute 0 43 Thousand/uL      Eosinophils Absolute 0 00 Thousand/uL      Basophils Absolute 0 00 Thousand/uL      Total Counted --     RBC Morphology Present     Polychromasia Present     Target Cells Present     Platelet Estimate Increased     Large Platelet Present    Protime-INR [[id number]]  (Normal) Collected: 10/28/22 1155    Lab Status: Final result Specimen: Blood from Arm, Right Updated: 10/28/22 1214     Protime 12 8 seconds      INR 0 95    APTT [[id number]]  (Normal) Collected: 10/28/22 1155    Lab Status: Final result Specimen: Blood from Arm, Right Updated: 10/28/22 1214     PTT 25 seconds     Ammonia [[id number]]  (Abnormal) Collected: 10/28/22 1147    Lab Status: Final result Specimen: Blood from Arm, Right Updated: 10/28/22 1208     Ammonia 10 umol/L                  CT abdomen pelvis with contrast   Final Result by Jaylen Angeles Donovan MD (10/28 1411)      New postsurgical changes of right hemiabdomen end ileostomy with prior surgical changes of proctocolectomy with J-pouch  Mild inflammatory changes in right parastomal region, likely postsurgical change  3 0 cm fluid collection midline abdominal incision  Differential includes abscess, hematoma, or seroma given recent surgery  Unchanged 1 4 cm right mell-pouch lymph node, likely reactive  Additional chronic/incidental findings as detailed above  The study was marked in [facility] for immediate notification              Workstation performed: [id number]                    Procedures  ECG 12 Lead Documentation Only    Date/Time: 10/28/2022 4:53 PM  Performed by: Bud Duane, PA-C  Authorized by: Bud Duane, PA-C     Patient location:  ED  Interpretation:     Interpretation: abnormal    Rhythm:     Rhythm: sinus tachycardia    CriticalCare Time  Performed by: Bud Duane, PA-C  Authorized by: Bud Duane, PA-C     Critical care provider statement:     Critical care time (minutes):  65    Critical care was necessary to treat or prevent imminent or life-threatening deterioration of the following conditions:  Sepsis    Critical care was time spent personally by me on the following activities:  Blood draw for specimens, obtaining history from patient or surrogate, development of treatment plan with patient or surrogate, discussions with consultants, discussions with primary provider, evaluation of patient's response to treatment, examination of patient, interpretation of cardiac output measurements, ordering and performing treatments and interventions, ordering and review of laboratory studies, ordering and review of radiographic studies, re-evaluation of patient's condition and review of old charts             ED Course  ED Course as of 10/28/22 1655   Fri Oct 28, 2022   1204 WBC(!): 21 55   1236 LACTIC ACID(!!): 3 1   1445 Left message for surgical team at [address] 20yo+    [address] Most Recent Value   SBIRT (23 yo +)    In order to provide better care to our patients, we are screening all of our patients for alcohol and drug use  Would it be okay to ask you these screening questions? No Filed at: 10/28/2022 1129                    [facility]  Number of Diagnoses or Management Options  Abdominal wall abscess: new and requires workup  Sepsis [facility]): new and requires workup  Diagnosis management comments: Pt with sepsis and likely abdominal wall abscess  Spoke with surgical team at [facility] who wish the patient transferred to their service    Copy of radiology images pending  Pt was treated here with levoquin, vancomycin and IVF       Amount and/or Complexity of Data Reviewed  Clinical lab tests: ordered and reviewed  Tests in the radiology section of CPT®: ordered and reviewed  Tests in the medicine section of CPT®: ordered and reviewed  Discussion of test results with the performing providers: yes  Decide to obtain previous medical records or to obtain history from someone other than the patient: yes  Review and summarize past medical records: yes  Discuss the patient with other providers: yes  Independent visualization of images, tracings, or specimens: yes    Risk of Complications, Morbidity, and/or Mortality  Presenting problems: high  Diagnostic procedures: high  Management options: high    Patient Progress  Patient progress: stable      Disposition  Final diagnoses:   Abdominal wall abscess   Sepsis ([address] )     Time reflects when diagnosis was documented in both MDM as applicable and the Disposition within this note     Time User Action Codes Description Comment    10/28/2022  4:33 PM Emily Berkowitz Add [[id number]] Abdominal wall abscess     10/28/2022  4:33 PM Juan Khan [address] [[id number]] Sepsis [facility])       ED Disposition     ED Disposition   Transfer to Another [facility]    Condition   -- Date/Time   Fri Oct 28, 2022  4:33 PM    Comment   Natali Erm should be transferred out to [facility]  Follow-up Information    None         Patient's Medications   Discharge Prescriptions    No medications on file       No discharge procedures on file      PDMP Review       Value Time User    PDMP Reviewed  Yes 10/7/2022  3:16 PM Radha Oneal DO          ED Provider  Electronically Signed by           Anne Dumont PA-C  10/28/22 9124

## 2022-10-28 NOTE — ED NOTES
Patient  C/o of itching and skin irritation , will let Reena CORRIGAN know       Linda Lewis RN  10/28/22 5850

## 2022-10-29 LAB
ATRIAL RATE: 130 BPM
C DIFF TOX GENS STL QL NAA+PROBE: NEGATIVE
P AXIS: 56 DEGREES
PR INTERVAL: 154 MS
QRS AXIS: -7 DEGREES
QRSD INTERVAL: 78 MS
QT INTERVAL: 284 MS
QTC INTERVAL: 417 MS
T WAVE AXIS: 45 DEGREES
VENTRICULAR RATE: 130 BPM

## 2022-10-30 LAB
BACTERIA BLD CULT: NORMAL
BACTERIA BLD CULT: NORMAL

## 2022-11-02 LAB
BACTERIA BLD CULT: NORMAL
BACTERIA BLD CULT: NORMAL

## 2022-11-06 ENCOUNTER — HOSPITAL ENCOUNTER (EMERGENCY)
Facility: HOSPITAL | Age: 27
Discharge: HOME | End: 2022-11-06
Attending: EMERGENCY MEDICINE

## 2022-11-06 ENCOUNTER — APPOINTMENT (EMERGENCY)
Dept: RADIOLOGY | Facility: HOSPITAL | Age: 27
End: 2022-11-06

## 2022-11-06 VITALS
TEMPERATURE: 99 F | BODY MASS INDEX: 24.84 KG/M2 | HEART RATE: 70 BPM | SYSTOLIC BLOOD PRESSURE: 119 MMHG | DIASTOLIC BLOOD PRESSURE: 56 MMHG | OXYGEN SATURATION: 100 % | RESPIRATION RATE: 18 BRPM | WEIGHT: 168.2 LBS

## 2022-11-06 DIAGNOSIS — R10.84 GENERALIZED ABDOMINAL PAIN: Primary | ICD-10-CM

## 2022-11-06 LAB
ALBUMIN SERPL BCP-MCNC: 3 G/DL (ref 3.5–5)
ALP SERPL-CCNC: 89 U/L (ref 46–116)
ALT SERPL W P-5'-P-CCNC: 32 U/L (ref 12–78)
ANION GAP SERPL CALCULATED.3IONS-SCNC: 10 MMOL/L (ref 4–13)
BASOPHILS # BLD AUTO: 0.1 THOUSANDS/ÂΜL (ref 0–0.1)
BASOPHILS NFR BLD AUTO: 1 % (ref 0–1)
BILIRUB SERPL-MCNC: 0.31 MG/DL (ref 0.2–1)
BUN SERPL-MCNC: 11 MG/DL (ref 5–25)
CALCIUM ALBUM COR SERPL-MCNC: 9.3 MG/DL (ref 8.3–10.1)
CALCIUM SERPL-MCNC: 8.5 MG/DL (ref 8.3–10.1)
CHLORIDE SERPL-SCNC: 106 MMOL/L (ref 96–108)
CO2 SERPL-SCNC: 27 MMOL/L (ref 21–32)
CREAT SERPL-MCNC: 0.95 MG/DL (ref 0.6–1.3)
EOSINOPHIL # BLD AUTO: 0.25 THOUSAND/ÂΜL (ref 0–0.61)
EOSINOPHIL NFR BLD AUTO: 4 % (ref 0–6)
ERYTHROCYTE [DISTWIDTH] IN BLOOD BY AUTOMATED COUNT: 18.2 % (ref 11.6–15.1)
GFR SERPL CREATININE-BSD FRML MDRD: 107 ML/MIN/1.73SQ M
GLUCOSE SERPL-MCNC: 98 MG/DL (ref 65–140)
HCT VFR BLD AUTO: 31.3 % (ref 36.5–49.3)
HGB BLD-MCNC: 9.4 G/DL (ref 12–17)
IMM GRANULOCYTES # BLD AUTO: 0.04 THOUSAND/UL (ref 0–0.2)
IMM GRANULOCYTES NFR BLD AUTO: 1 % (ref 0–2)
LACTATE SERPL-SCNC: 1.3 MMOL/L (ref 0.5–2)
LIPASE SERPL-CCNC: 94 U/L (ref 73–393)
LYMPHOCYTES # BLD AUTO: 1.57 THOUSANDS/ÂΜL (ref 0.6–4.47)
LYMPHOCYTES NFR BLD AUTO: 22 % (ref 14–44)
MAGNESIUM SERPL-MCNC: 1.6 MG/DL (ref 1.6–2.6)
MCH RBC QN AUTO: 19.5 PG (ref 26.8–34.3)
MCHC RBC AUTO-ENTMCNC: 30 G/DL (ref 31.4–37.4)
MCV RBC AUTO: 65 FL (ref 82–98)
MONOCYTES # BLD AUTO: 1.17 THOUSAND/ÂΜL (ref 0.17–1.22)
MONOCYTES NFR BLD AUTO: 16 % (ref 4–12)
NEUTROPHILS # BLD AUTO: 4.08 THOUSANDS/ÂΜL (ref 1.85–7.62)
NEUTS SEG NFR BLD AUTO: 56 % (ref 43–75)
NRBC BLD AUTO-RTO: 0 /100 WBCS
PLATELET # BLD AUTO: 372 THOUSANDS/UL (ref 149–390)
PMV BLD AUTO: 8.4 FL (ref 8.9–12.7)
POTASSIUM SERPL-SCNC: 4 MMOL/L (ref 3.5–5.3)
PROT SERPL-MCNC: 6.6 G/DL (ref 6.4–8.4)
RBC # BLD AUTO: 4.83 MILLION/UL (ref 3.88–5.62)
SODIUM SERPL-SCNC: 143 MMOL/L (ref 135–147)
WBC # BLD AUTO: 7.21 THOUSAND/UL (ref 4.31–10.16)

## 2022-11-06 RX ORDER — HYDROMORPHONE HCL/PF 1 MG/ML
1 SYRINGE (ML) INJECTION ONCE
Status: COMPLETED | OUTPATIENT
Start: 2022-11-06 | End: 2022-11-06

## 2022-11-06 RX ORDER — HYDROMORPHONE HCL/PF 1 MG/ML
1 SYRINGE (ML) INJECTION
Status: DISCONTINUED | OUTPATIENT
Start: 2022-11-06 | End: 2022-11-06

## 2022-11-06 RX ORDER — ONDANSETRON 2 MG/ML
4 INJECTION INTRAMUSCULAR; INTRAVENOUS ONCE
Status: COMPLETED | OUTPATIENT
Start: 2022-11-06 | End: 2022-11-06

## 2022-11-06 RX ADMIN — HYDROMORPHONE HYDROCHLORIDE 1 MG: 1 INJECTION, SOLUTION INTRAMUSCULAR; INTRAVENOUS; SUBCUTANEOUS at 20:53

## 2022-11-06 RX ADMIN — HYDROMORPHONE HYDROCHLORIDE 1 MG: 1 INJECTION, SOLUTION INTRAMUSCULAR; INTRAVENOUS; SUBCUTANEOUS at 22:03

## 2022-11-06 RX ADMIN — SODIUM CHLORIDE 1000 ML: 0.9 INJECTION, SOLUTION INTRAVENOUS at 20:54

## 2022-11-06 RX ADMIN — ONDANSETRON 4 MG: 2 INJECTION INTRAMUSCULAR; INTRAVENOUS at 20:54

## 2022-11-06 RX ADMIN — IOHEXOL 100 ML: 350 INJECTION, SOLUTION INTRAVENOUS at 21:30

## 2022-11-07 ENCOUNTER — TELEPHONE (OUTPATIENT)
Dept: FAMILY MEDICINE CLINIC | Facility: CLINIC | Age: 27
End: 2022-11-07

## 2022-11-07 ENCOUNTER — TELEPHONE (OUTPATIENT)
Dept: PAIN MEDICINE | Facility: MEDICAL CENTER | Age: 27
End: 2022-11-07

## 2022-11-07 DIAGNOSIS — F11.90 OPIOID USE: ICD-10-CM

## 2022-11-07 DIAGNOSIS — R10.9 ABDOMINAL PAIN, UNSPECIFIED ABDOMINAL LOCATION: Primary | ICD-10-CM

## 2022-11-07 RX ORDER — HYDROMORPHONE HYDROCHLORIDE 2 MG/1
6 TABLET ORAL EVERY 6 HOURS PRN
Qty: 60 TABLET | Refills: 0 | Status: SHIPPED | OUTPATIENT
Start: 2022-11-07

## 2022-11-07 RX ORDER — NALOXONE HYDROCHLORIDE 4 MG/.1ML
SPRAY NASAL
Qty: 1 EACH | Refills: 1 | Status: SHIPPED | OUTPATIENT
Start: 2022-11-07

## 2022-11-07 NOTE — PROGRESS NOTES
sw father then Sky Aldana  He states the px mgmt at [facility] only gave him 5 days of dilaudid 2mg used 6mg qid x 5d (as per instructions) and now nearly out  Aware that my first recommendation is eval of pain but was in ER last pm with normal CT  Next recommendations having [facility] specialists (surgeon and gi and px mgmt) address his pain due to his condition but apparently they won't and suggested he see me or local px mgmt  Offered 5 days ( checked) of dilaudid but aware I do not do chronic px meds but offered Dr Jennifer Barbour (with no promises of pain mgmt) vs px mgmt outside of [place]  He understands  rx sent  Risks of opioids use and withdrawal aware

## 2022-11-07 NOTE — ED PROVIDER NOTES
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 Taking? DULoxetine (CYMBALTA) 60 mg delayed release capsule   No No   Sig: Take 1 capsule (60 mg total) by mouth daily   pantoprazole (PROTONIX) 40 mg tablet   No No   Sig: Take 1 tablet (40 mg total) by mouth 2 (two) times a day   predniSONE 10 mg tablet   No No   Sig: Take 4 tablets (40 mg total) by mouth daily for 7 days, THEN 3 5 tablets (35 mg total) daily for 7 days, THEN 3 tablets (30 mg total) daily for 7 days, THEN 2 5 tablets (25 mg total) daily for 7 days, THEN 2 tablets (20 mg total) daily for 7 days, THEN 1 5 tablets (15 mg total) daily for 7 days, THEN 1 tablet (10 mg total) daily for 7 days, THEN 0 5 tablets (5 mg total) daily for 7 days     saccharomyces boulardii (FLORASTOR) 250 mg capsule   No No   Sig: Take 1 capsule (250 mg total) by mouth 2 (two) times a day      Facility-Administered Medications: None       Past Medical History:   Diagnosis Date   • Ankylosing spondylitis (HCC)    • Anxiety    • Bowel obstruction (HCC)    • Clostridium difficile colitis 9/13/2018   • Colitis    • 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[facility] ) 9/13/2018   • Pancreatitis    • [facility] Past Surgical History:   Procedure Laterality Date   • COLECTOMY TOTAL      with ileal pouch and anastemosis   • IR PICC PLACEMENT SINGLE LUMEN  3/1/2022   • TOTAL COLECTOMY         History reviewed  No pertinent family history  I have reviewed and agree with the history as documented  E-Cigarette/Vaping   • E-Cigarette Use Never User      E-Cigarette/Vaping Substances   • Nicotine No    • THC No    • CBD No    • Flavoring No    • Other No    • Unknown No      Social History     Tobacco Use   • Smoking status: Never Smoker   • Smokeless tobacco: Never Used   Vaping Use   • Vaping Use: Never used   Substance Use Topics   • Alcohol use: Not Currently     Comment: pt states 5 a month/socially   • Drug use: No       Review of Systems   Constitutional: Negative  HENT: Negative  Eyes: Negative  Respiratory: Negative  Cardiovascular: Negative  Gastrointestinal: Positive for abdominal pain  Endocrine: Negative  Genitourinary: Negative  Musculoskeletal: Negative  Skin: Negative  Allergic/Immunologic: Negative  Neurological: Negative  Hematological: Negative  Psychiatric/Behavioral: Negative  All other systems reviewed and are negative  Physical Exam  Physical Exam  Constitutional:       Appearance: Normal appearance  HENT:      Head: Normocephalic and atraumatic  Nose: Nose normal       Mouth/Throat:      Mouth: Mucous membranes are moist    Eyes:      Extraocular Movements: Extraocular movements intact  Pupils: Pupils are equal, round, and reactive to light  Cardiovascular:      Rate and Rhythm: Normal rate and regular rhythm  Pulmonary:      Effort: Pulmonary effort is normal       Breath sounds: Normal breath sounds  Abdominal:      General: Abdomen is flat  Bowel sounds are normal       Palpations: Abdomen is soft  Tenderness: There is generalized abdominal tenderness  There is no right CVA tenderness, left CVA tenderness, guarding or rebound  Negative signs include Beatty's sign, Rovsing's sign, McBurney's sign, psoas sign and obturator sign  Musculoskeletal:         General: Normal range of motion  Cervical back: Normal range of motion and neck supple  Skin:     General: Skin is warm  Capillary Refill: Capillary refill takes less than 2 seconds  Neurological:      General: No focal deficit present  Mental Status: He is alert and oriented to person, place, and time  Mental status is at baseline  Psychiatric:         Mood and Affect: Mood normal          Thought Content: Thought content normal          Vital Signs  ED Triage Vitals [11/06/22 2004]   Temperature Pulse Respirations Blood Pressure SpO2   99 °F (37 2 °C) (!) 106 20 139/88 100 %      Temp Source Heart Rate Source Patient Position - Orthostatic VS BP Location FiO2 (%)   Tympanic Monitor Sitting Left arm --      Pain Score       9           Vitals:    11/06/22 2004   BP: 139/88   Pulse: (!) 106   Patient Position - Orthostatic VS: Sitting         Visual Acuity      ED Medications  Medications   sodium chloride 0 9 % bolus 1,000 mL (1,000 mL Intravenous New Bag 11/6/22 2054)   HYDROmorphone (DILAUDID) injection 1 mg (1 mg Intravenous Given 11/6/22 2053)   ondansetron (ZOFRAN) injection 4 mg (4 mg Intravenous Given 11/6/22 2054)   iohexol (OMNIPAQUE) 350 MG/ML injection (SINGLE-DOSE) 100 mL (100 mL Intravenous Given 11/6/22 2130)       Diagnostic Studies  Results Reviewed     Procedure Component Value Units Date/Time    Lactic acid [[id number]]  (Normal) Collected: 11/06/22 2055    Lab Status: Final result Specimen: Blood from Arm, Left Updated: 11/06/22 2125     LACTIC ACID 1 3 mmol/L     Narrative:      Result may be elevated if tourniquet was used during collection      Comprehensive metabolic panel [[id number]]  (Abnormal) Collected: 11/06/22 2055    Lab Status: Final result Specimen: Blood from Arm, Left Updated: 11/06/22 2122     Sodium 143 mmol/L      Potassium 4 0 mmol/L      Chloride 106 mmol/L      CO2 27 mmol/L      ANION GAP 10 mmol/L      BUN 11 mg/dL      Creatinine 0 95 mg/dL      Glucose 98 mg/dL      Calcium 8 5 mg/dL      Corrected Calcium 9 3 mg/dL      AST --     ALT 32 U/L      Alkaline Phosphatase 89 U/L      Total Protein 6 6 g/dL      Albumin 3 0 g/dL      Total Bilirubin 0 31 mg/dL      eGFR 107 ml/min/1 73sq m     Narrative:      [place] guidelines for Chronic Kidney Disease (CKD):   •  Stage 1 with normal or high GFR (GFR > 90 mL/min/1 73 square meters)  •  Stage 2 Mild CKD (GFR = 60-89 mL/min/1 73 square meters)  •  Stage 3A Moderate CKD (GFR = 45-59 mL/min/1 73 square meters)  •  Stage 3B Moderate CKD (GFR = 30-44 mL/min/1 73 square meters)  •  Stage 4 Severe CKD (GFR = 15-29 mL/min/1 73 square meters)  •  Stage 5 End Stage CKD (GFR <15 mL/min/1 73 square meters)  Note: GFR calculation is accurate only with a steady state creatinine    Magnesium [[id number]]  (Normal) Collected: 11/06/22 2055    Lab Status: Final result Specimen: Blood from Arm, Left Updated: 11/06/22 2121     Magnesium 1 6 mg/dL     Lipase [[id number]]  (Normal) Collected: 11/06/22 2055    Lab Status: Final result Specimen: Blood from Arm, Left Updated: 11/06/22 2121     Lipase 94 u/L     CBC and differential [[id number]]  (Abnormal) Collected: 11/06/22 2055    Lab Status: Final result Specimen: Blood from Arm, Left Updated: 11/06/22 2103     WBC 7 21 Thousand/uL      RBC 4 83 Million/uL      Hemoglobin 9 4 g/dL      Hematocrit 31 3 %      MCV 65 fL      MCH 19 5 pg      MCHC 30 0 g/dL      RDW 18 2 %      MPV 8 4 fL      Platelets 073 Thousands/uL      nRBC 0 /100 WBCs      Neutrophils Relative 56 %      Immat GRANS % 1 %      Lymphocytes Relative 22 %      Monocytes Relative 16 %      Eosinophils Relative 4 %      Basophils Relative 1 %      Neutrophils Absolute 4 08 Thousands/µL      Immature Grans Absolute 0 04 Thousand/uL      Lymphocytes Absolute 1 57 Thousands/µL      Monocytes Absolute 1 17 Thousand/µL      Eosinophils Absolute 0 25 Thousand/µL      Basophils Absolute 0 10 Thousands/µL                  CT abdomen pelvis with contrast   Final Result by Blake Neves MD (11/06 2145)      No acute findings  Status post proctocolectomy with J-pouch and right lower quadrant ileostomy  Distended J-pouch similar in appearance to 10/28/2022               Workstation performed: [id number]                    Procedures  Procedures         ED Course                                             MDM  Number of Diagnoses or Management Options     Amount and/or Complexity of Data Reviewed  Clinical lab tests: ordered and reviewed  Tests in the radiology section of CPT®: reviewed and ordered  Tests in the medicine section of CPT®: ordered and reviewed    Patient Progress  Patient progress: stable      Disposition  Final diagnoses:   Generalized abdominal pain     Time reflects when diagnosis was documented in both MDM as applicable and the Disposition within this note     Time User Action Codes Description Comment    11/6/2022  9:48 PM Brenton King Add [R10 84] Generalized abdominal pain       ED Disposition     ED Disposition   Discharge    Condition   Stable    Date/Time   Sun Nov 6, 2022  9:48 PM    Comment   Cecile Moore discharge to home/self care                 Follow-up Information     Follow up With Specialties Details Why Contact Info Additional Information    Jama Talamantes DO Family Medicine Schedule an appointment as soon as possible for a visit   [address] [place] [address]       [address] [facility]  If symptoms worsen [facility]  [phone] [address] [facility], [place], [place], [address]          Patient's Medications   Discharge Prescriptions    No medications on file No discharge procedures on file      PDMP Review       Value Time User    PDMP Reviewed  Yes 10/7/2022  3:16 PM [address] DO          ED Provider  Electronically Signed by           Grisel Cleary DO  11/06/22 1206

## 2022-11-07 NOTE — TELEPHONE ENCOUNTER
Caller: Patient    Doctor: n/a    Reason for call: Called to schedule appt for abdomen pain    Call back#:

## 2022-11-07 NOTE — TELEPHONE ENCOUNTER
Patient calling and wants to know if Dr Terry Tran will prescribe him a day of pain meds until his appointment tomorrow  He states that he is in a lot of pain and he is out until he is seen tomorrow here at the office  I asked which med he needs and he stated that the hospital gave him 6ml of Dilaudid      [facility]    Please call patient

## 2022-11-08 PROBLEM — Z93.2 ILEOSTOMY PRESENT (HCC): Status: ACTIVE | Noted: 2022-10-15

## 2022-11-08 PROBLEM — Z91.89 AT HIGH RISK FOR CARDIAC ARRHYTHMIA: Status: ACTIVE | Noted: 2022-10-19

## 2022-11-08 PROBLEM — L30.9 PERISTOMAL DERMATITIS: Status: ACTIVE | Noted: 2022-10-19

## 2022-11-08 PROBLEM — R10.9 ABDOMINAL PAIN: Status: RESOLVED | Noted: 2018-09-13 | Resolved: 2022-11-08

## 2022-11-10 ENCOUNTER — TELEPHONE (OUTPATIENT)
Dept: OTHER | Facility: OTHER | Age: 27
End: 2022-11-10

## 2022-11-10 NOTE — TELEPHONE ENCOUNTER
Patient is currently on 6mg of hydromorphone and needs a doctor that can help him lower this dose  Please call if you have any suggestions       In the mean time I will also send a message on his behalf to Dr Di Florez who is familiar with his case     We had a fire drill, so please excuse the first message

## 2022-11-10 NOTE — TELEPHONE ENCOUNTER
Patient is just calling back to reach out for any help at all   He did attempt to see the Pain Management doctor but they will not treat anything abdominal,

## 2022-11-10 NOTE — TELEPHONE ENCOUNTER
Caller: Marii Simpson    Doctor: Alexander Essex    Reason for call: patient has new patient referral

## 2022-11-11 ENCOUNTER — APPOINTMENT (EMERGENCY)
Dept: RADIOLOGY | Facility: HOSPITAL | Age: 27
End: 2022-11-11

## 2022-11-11 ENCOUNTER — HOSPITAL ENCOUNTER (EMERGENCY)
Facility: HOSPITAL | Age: 27
Discharge: HOME | End: 2022-11-11
Attending: EMERGENCY MEDICINE

## 2022-11-11 VITALS
DIASTOLIC BLOOD PRESSURE: 61 MMHG | BODY MASS INDEX: 24.66 KG/M2 | RESPIRATION RATE: 18 BRPM | TEMPERATURE: 97.8 F | HEART RATE: 92 BPM | WEIGHT: 167 LBS | SYSTOLIC BLOOD PRESSURE: 122 MMHG | OXYGEN SATURATION: 99 %

## 2022-11-11 DIAGNOSIS — R10.9 ABDOMINAL PAIN: Primary | ICD-10-CM

## 2022-11-11 LAB
ALBUMIN SERPL BCP-MCNC: 3.5 G/DL (ref 3.5–5)
ALP SERPL-CCNC: 111 U/L (ref 46–116)
ALT SERPL W P-5'-P-CCNC: 44 U/L (ref 12–78)
ANION GAP SERPL CALCULATED.3IONS-SCNC: 9 MMOL/L (ref 4–13)
BASOPHILS # BLD AUTO: 0.12 THOUSANDS/ÂΜL (ref 0–0.1)
BASOPHILS NFR BLD AUTO: 1 % (ref 0–1)
BILIRUB SERPL-MCNC: 0.33 MG/DL (ref 0.2–1)
BUN SERPL-MCNC: 10 MG/DL (ref 5–25)
CALCIUM SERPL-MCNC: 9 MG/DL (ref 8.3–10.1)
CHLORIDE SERPL-SCNC: 105 MMOL/L (ref 96–108)
CO2 SERPL-SCNC: 28 MMOL/L (ref 21–32)
CREAT SERPL-MCNC: 0.97 MG/DL (ref 0.6–1.3)
EOSINOPHIL # BLD AUTO: 0.46 THOUSAND/ÂΜL (ref 0–0.61)
EOSINOPHIL NFR BLD AUTO: 5 % (ref 0–6)
ERYTHROCYTE [DISTWIDTH] IN BLOOD BY AUTOMATED COUNT: 18.6 % (ref 11.6–15.1)
GFR SERPL CREATININE-BSD FRML MDRD: 105 ML/MIN/1.73SQ M
GLUCOSE SERPL-MCNC: 151 MG/DL (ref 65–140)
HCT VFR BLD AUTO: 37.9 % (ref 36.5–49.3)
HGB BLD-MCNC: 11.1 G/DL (ref 12–17)
IMM GRANULOCYTES # BLD AUTO: 0.06 THOUSAND/UL (ref 0–0.2)
IMM GRANULOCYTES NFR BLD AUTO: 1 % (ref 0–2)
LIPASE SERPL-CCNC: 79 U/L (ref 73–393)
LYMPHOCYTES # BLD AUTO: 1.74 THOUSANDS/ÂΜL (ref 0.6–4.47)
LYMPHOCYTES NFR BLD AUTO: 20 % (ref 14–44)
MCH RBC QN AUTO: 19.1 PG (ref 26.8–34.3)
MCHC RBC AUTO-ENTMCNC: 29.3 G/DL (ref 31.4–37.4)
MCV RBC AUTO: 65 FL (ref 82–98)
MONOCYTES # BLD AUTO: 1.07 THOUSAND/ÂΜL (ref 0.17–1.22)
MONOCYTES NFR BLD AUTO: 13 % (ref 4–12)
NEUTROPHILS # BLD AUTO: 5.07 THOUSANDS/ÂΜL (ref 1.85–7.62)
NEUTS SEG NFR BLD AUTO: 60 % (ref 43–75)
NRBC BLD AUTO-RTO: 0 /100 WBCS
PLATELET # BLD AUTO: 429 THOUSANDS/UL (ref 149–390)
PMV BLD AUTO: 9.8 FL (ref 8.9–12.7)
POTASSIUM SERPL-SCNC: 4 MMOL/L (ref 3.5–5.3)
PROT SERPL-MCNC: 7.7 G/DL (ref 6.4–8.4)
RBC # BLD AUTO: 5.8 MILLION/UL (ref 3.88–5.62)
SODIUM SERPL-SCNC: 142 MMOL/L (ref 135–147)
WBC # BLD AUTO: 8.52 THOUSAND/UL (ref 4.31–10.16)

## 2022-11-11 RX ORDER — ONDANSETRON 2 MG/ML
INJECTION INTRAMUSCULAR; INTRAVENOUS
Status: COMPLETED
Start: 2022-11-11 | End: 2022-11-11

## 2022-11-11 RX ORDER — HYDROMORPHONE HCL 110MG/55ML
2 PATIENT CONTROLLED ANALGESIA SYRINGE INTRAVENOUS ONCE
Status: COMPLETED | OUTPATIENT
Start: 2022-11-11 | End: 2022-11-11

## 2022-11-11 RX ORDER — OXYCODONE HYDROCHLORIDE AND ACETAMINOPHEN 5; 325 MG/1; MG/1
2 TABLET ORAL ONCE
Status: COMPLETED | OUTPATIENT
Start: 2022-11-11 | End: 2022-11-11

## 2022-11-11 RX ORDER — ONDANSETRON 2 MG/ML
4 INJECTION INTRAMUSCULAR; INTRAVENOUS ONCE
Status: COMPLETED | OUTPATIENT
Start: 2022-11-11 | End: 2022-11-11

## 2022-11-11 RX ADMIN — ONDANSETRON 4 MG: 2 INJECTION INTRAMUSCULAR; INTRAVENOUS at 18:48

## 2022-11-11 RX ADMIN — HYDROMORPHONE HYDROCHLORIDE 2 MG: 2 INJECTION, SOLUTION INTRAMUSCULAR; INTRAVENOUS; SUBCUTANEOUS at 20:06

## 2022-11-11 RX ADMIN — SODIUM CHLORIDE 1000 ML: 0.9 INJECTION, SOLUTION INTRAVENOUS at 18:03

## 2022-11-11 RX ADMIN — OXYCODONE HYDROCHLORIDE AND ACETAMINOPHEN 2 TABLET: 5; 325 TABLET ORAL at 20:47

## 2022-11-11 RX ADMIN — HYDROMORPHONE HYDROCHLORIDE 2 MG: 2 INJECTION, SOLUTION INTRAMUSCULAR; INTRAVENOUS; SUBCUTANEOUS at 18:04

## 2022-11-11 NOTE — TELEPHONE ENCOUNTER
Sw pt  Has been continuous opioids for weeks per pt, inpt with PCA but also at home  Suggested wean with his 12 remaining pills as 1 po tid x 2d then 1 po bid x 2d then 1 po qd x 2d  If he has severe abdominal pain, he may need evaluation in local ER since [facility] have suggested he be seen locally instead of going to [facility] due to their long ER times

## 2022-11-11 NOTE — ED PROVIDER NOTES
History  Chief Complaint   Patient presents with   • Abdominal Pain     Pt c/o abd pain, colostomy bag had no output since 12 noon today  Pt last emptied colostomy at 1000 today     Patient has a complicated abdominal history including inflammatory bowel disease, status post colectomy with J-pouch formation, consequent pouchitis, consequent multiple bouts of obstruction partial obstruction  Patient recently underwent diverting ileostomy about a month ago  That was complicated by a prolapse of the ileostomy, followed by a bout of localized intra-abdominal infection percutaneously drained about 2 weeks ago  Patient states he was well last few days until today when he noted no output in his ileostomy bag today  Patient normally has to change the bag between for 6 times a day  He is having increasing abdominal distention and bloating with nausea but no vomiting  Patient has not had fever chills  He just noticed small amount of fluid and air in the bag after arrival           Prior to Admission Medications   Prescriptions Last Dose Informant Patient Reported? Taking? DULoxetine (CYMBALTA) 60 mg delayed release capsule   No No   Sig: Take 1 capsule (60 mg total) by mouth daily   HYDROmorphone (DILAUDID) 2 mg tablet   No No   Sig: Take 3 tablets (6 mg total) by mouth every 6 (six) hours as needed for severe pain (less if possible) Max Daily Amount: 24 mg   levofloxacin (LEVAQUIN) 500 mg tablet   Yes No   loperamide (IMODIUM) 2 mg capsule   Yes No   Sig: Take 2 mg by mouth 4 (four) times a day as needed   naloxone (NARCAN) 4 mg/0 1 mL nasal spray   No No   Sig: Administer 1 spray into a nostril  If no response after 2-3 minutes, give another dose in the other nostril using a new spray  naloxone (NARCAN) 4 mg/0 1 mL nasal spray   Yes No   Sig: Spray into one nostril if excess sedation  Call 911  Repeat every 2-3 min in alternate nostril until awake or EMS arrives     nystatin (MYCOSTATIN) powder   Yes No   Sig: Apply topically   pantoprazole (PROTONIX) 40 mg tablet   No No   Sig: Take 1 tablet (40 mg total) by mouth 2 (two) times a day   predniSONE 10 mg tablet   No No   Sig: Take 4 tablets (40 mg total) by mouth daily for 7 days, THEN 3 5 tablets (35 mg total) daily for 7 days, THEN 3 tablets (30 mg total) daily for 7 days, THEN 2 5 tablets (25 mg total) daily for 7 days, THEN 2 tablets (20 mg total) daily for 7 days, THEN 1 5 tablets (15 mg total) daily for 7 days, THEN 1 tablet (10 mg total) daily for 7 days, THEN 0 5 tablets (5 mg total) daily for 7 days  saccharomyces boulardii (FLORASTOR) 250 mg capsule   No No   Sig: Take 1 capsule (250 mg total) by mouth 2 (two) times a day      Facility-Administered Medications: None       Past Medical History:   Diagnosis Date   • Ankylosing spondylitis (HCC)    • Anxiety    • Bowel obstruction (HCC)    • Clostridium difficile colitis 9/13/2018   • Colitis    • 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[facility] ) 9/13/2018   • Pancreatitis    • Ulcerative colitis ([facility] )        Past Surgical History:   Procedure Laterality Date   • COLECTOMY TOTAL      with ileal pouch and anastemosis   • IR PICC PLACEMENT SINGLE LUMEN  3/1/2022   • TOTAL COLECTOMY         No family history on file  I have reviewed and agree with the history as documented  E-Cigarette/Vaping   • E-Cigarette Use Never User      E-Cigarette/Vaping Substances   • Nicotine No    • THC No    • CBD No    • Flavoring No    • Other No    • Unknown No      Social History     Tobacco Use   • Smoking status: Never Smoker   • Smokeless tobacco: Never Used   Vaping Use   • Vaping Use: Never used   Substance Use Topics   • Alcohol use: Not Currently     Comment: pt states 5 a month/socially   • Drug use: No       Review of Systems   Constitutional: Negative for chills and fever  HENT: Negative for congestion and sore throat  Eyes: Negative for visual disturbance  Respiratory: Negative for cough and shortness of breath      Cardiovascular: Negative for chest pain  Gastrointestinal: Positive for abdominal distention, abdominal pain and nausea  Negative for vomiting  Genitourinary: Negative for decreased urine volume and dysuria  Musculoskeletal: Negative for back pain  Skin: Negative for rash  Neurological: Negative for weakness and headaches  Psychiatric/Behavioral: Negative for confusion  All other systems reviewed and are negative  Physical Exam  Physical Exam  Vitals and nursing note reviewed  Constitutional:       Appearance: He is well-developed  HENT:      Head: Normocephalic  Mouth/Throat:      Mouth: Mucous membranes are moist    Eyes:      Pupils: Pupils are equal, round, and reactive to light  Cardiovascular:      Rate and Rhythm: Regular rhythm  Tachycardia present  Heart sounds: Normal heart sounds  Pulmonary:      Effort: Pulmonary effort is normal       Breath sounds: Normal breath sounds  Abdominal:      General: Abdomen is protuberant  Bowel sounds are normal       Palpations: Abdomen is soft  Tenderness: There is generalized abdominal tenderness  Skin:     General: Skin is warm  Capillary Refill: Capillary refill takes less than 2 seconds  Neurological:      General: No focal deficit present  Mental Status: He is alert and oriented to person, place, and time     Psychiatric:         Mood and Affect: Mood normal          Behavior: Behavior normal          Vital Signs  ED Triage Vitals   Temperature Pulse Respirations Blood Pressure SpO2   11/11/22 1656 11/11/22 1656 11/11/22 1656 11/11/22 1656 11/11/22 1656   97 8 °F (36 6 °C) (!) 114 20 133/77 98 %      Temp src Heart Rate Source Patient Position - Orthostatic VS BP Location FiO2 (%)   -- 11/11/22 1656 11/11/22 1656 11/11/22 1656 --    Monitor Sitting Right arm       Pain Score       11/11/22 1804       8           Vitals:    11/11/22 1656 11/11/22 1845 11/11/22 2000   BP: 133/77 116/70 122/61   Pulse: (!) 114 88 92   Patient Position - Orthostatic VS: Sitting           Visual Acuity      ED Medications  Medications   sodium chloride 0 9 % bolus 1,000 mL (1,000 mL Intravenous New Bag 11/11/22 1803)   HYDROmorphone (DILAUDID) injection 2 mg (2 mg Intravenous Given 11/11/22 1804)   ondansetron (ZOFRAN) injection 4 mg (4 mg Intravenous Given 11/11/22 1848)   HYDROmorphone (DILAUDID) injection 2 mg (2 mg Intravenous Given 11/11/22 2006)       Diagnostic Studies  Results Reviewed     Procedure Component Value Units Date/Time    Comprehensive metabolic panel [[id number]]  (Abnormal) Collected: 11/11/22 1804    Lab Status: Final result Specimen: Blood from Arm, Left Updated: 11/11/22 1841     Sodium 142 mmol/L      Potassium 4 0 mmol/L      Chloride 105 mmol/L      CO2 28 mmol/L      ANION GAP 9 mmol/L      BUN 10 mg/dL      Creatinine 0 97 mg/dL      Glucose 151 mg/dL      Calcium 9 0 mg/dL      AST --     ALT 44 U/L      Alkaline Phosphatase 111 U/L      Total Protein 7 7 g/dL      Albumin 3 5 g/dL      Total Bilirubin 0 33 mg/dL      eGFR 105 ml/min/1 73sq m     Narrative:      [place] guidelines for Chronic Kidney Disease (CKD):   •  Stage 1 with normal or high GFR (GFR > 90 mL/min/1 73 square meters)  •  Stage 2 Mild CKD (GFR = 60-89 mL/min/1 73 square meters)  •  Stage 3A Moderate CKD (GFR = 45-59 mL/min/1 73 square meters)  •  Stage 3B Moderate CKD (GFR = 30-44 mL/min/1 73 square meters)  •  Stage 4 Severe CKD (GFR = 15-29 mL/min/1 73 square meters)  •  Stage 5 End Stage CKD (GFR <15 mL/min/1 73 square meters)  Note: GFR calculation is accurate only with a steady state creatinine    Lipase [[id number]]  (Normal) Collected: 11/11/22 1804    Lab Status: Final result Specimen: Blood from Arm, Left Updated: 11/11/22 1840     Lipase 79 u/L     CBC and differential [[id number]]  (Abnormal) Collected: 11/11/22 1804    Lab Status: Final result Specimen: Blood from Arm, Left Updated: 11/11/22 1813     WBC 8 52 Thousand/uL      RBC 5 80 Million/uL      Hemoglobin 11 1 g/dL      Hematocrit 37 9 %      MCV 65 fL      MCH 19 1 pg      MCHC 29 3 g/dL      RDW 18 6 %      MPV 9 8 fL      Platelets 004 Thousands/uL      nRBC 0 /100 WBCs      Neutrophils Relative 60 %      Immat GRANS % 1 %      Lymphocytes Relative 20 %      Monocytes Relative 13 %      Eosinophils Relative 5 %      Basophils Relative 1 %      Neutrophils Absolute 5 07 Thousands/µL      Immature Grans Absolute 0 06 Thousand/uL      Lymphocytes Absolute 1 74 Thousands/µL      Monocytes Absolute 1 07 Thousand/µL      Eosinophils Absolute 0 46 Thousand/µL      Basophils Absolute 0 12 Thousands/µL     UA (URINE) with reflex to Scope [[id number]]     Lab Status: No result Specimen: Urine                  XR abdomen obstruction series    (Results Pending)              Procedures  Procedures         ED Course                               SBIRT 20yo+    Flowsheet Row Most Recent Value   SBIRT (23 yo +)    In order to provide better care to our patients, we are screening all of our patients for alcohol and drug use  Would it be okay to ask you these screening questions? No Filed at: 11/11/2022 2022                    [facility]  Number of Diagnoses or Management Options  Diagnosis management comments: Patient appears to have a partial small-bowel obstruction  Will hydrate provide antiemetics and pain medication      Disposition  Final diagnoses:   Abdominal pain     Time reflects when diagnosis was documented in both MDM as applicable and the Disposition within this note     Time User Action Codes Description Comment    11/11/2022  8:19 PM Sapp[place] Mo Add [R10 9] Abdominal pain       ED Disposition     ED Disposition   Discharge    Condition   Stable    Date/Time   Fri Nov 11, 2022  8:18 PM    Comment   Ruben Prater discharge to home/self care                 Follow-up Information     Follow up With Specialties Details Why [facility],  Family Medicine Schedule an appointment as soon as possible for a visit   [address]  [phone]            Patient's Medications   Discharge Prescriptions    No medications on file       No discharge procedures on file      PDMP Review       Value Time User    PDMP Reviewed  Yes 11/7/2022  5:49 PM Jama Talamantes DO          ED Provider  Electronically Signed by           Popeye Wallace MD  11/11/22 2020

## 2022-11-13 ENCOUNTER — APPOINTMENT (EMERGENCY)
Dept: RADIOLOGY | Facility: HOSPITAL | Age: 27
End: 2022-11-13

## 2022-11-13 ENCOUNTER — HOSPITAL ENCOUNTER (EMERGENCY)
Facility: HOSPITAL | Age: 27
Discharge: HOME | End: 2022-11-13
Attending: EMERGENCY MEDICINE

## 2022-11-13 VITALS
OXYGEN SATURATION: 100 % | HEART RATE: 84 BPM | TEMPERATURE: 98.8 F | SYSTOLIC BLOOD PRESSURE: 121 MMHG | DIASTOLIC BLOOD PRESSURE: 76 MMHG | RESPIRATION RATE: 18 BRPM

## 2022-11-13 DIAGNOSIS — R10.9 ABDOMINAL PAIN: Primary | ICD-10-CM

## 2022-11-13 DIAGNOSIS — R11.0 NAUSEA: ICD-10-CM

## 2022-11-13 LAB
ALBUMIN SERPL BCP-MCNC: 4.1 G/DL (ref 3.5–5)
ALP SERPL-CCNC: 124 U/L (ref 46–116)
ALT SERPL W P-5'-P-CCNC: 44 U/L (ref 12–78)
ANION GAP SERPL CALCULATED.3IONS-SCNC: 10 MMOL/L (ref 4–13)
APTT PPP: 29 SECONDS (ref 23–37)
ATRIAL RATE: 89 BPM
BACTERIA UR QL AUTO: ABNORMAL /HPF
BASOPHILS # BLD AUTO: 0.13 THOUSANDS/ÂΜL (ref 0–0.1)
BASOPHILS NFR BLD AUTO: 1 % (ref 0–1)
BILIRUB SERPL-MCNC: 0.4 MG/DL (ref 0.2–1)
BILIRUB UR QL STRIP: ABNORMAL
BUN SERPL-MCNC: 10 MG/DL (ref 5–25)
CALCIUM SERPL-MCNC: 9.7 MG/DL (ref 8.3–10.1)
CAOX CRY URNS QL MICRO: ABNORMAL /HPF
CHLORIDE SERPL-SCNC: 103 MMOL/L (ref 96–108)
CLARITY UR: CLEAR
CO2 SERPL-SCNC: 29 MMOL/L (ref 21–32)
COLOR UR: YELLOW
CREAT SERPL-MCNC: 1.03 MG/DL (ref 0.6–1.3)
EOSINOPHIL # BLD AUTO: 0.27 THOUSAND/ÂΜL (ref 0–0.61)
EOSINOPHIL NFR BLD AUTO: 2 % (ref 0–6)
ERYTHROCYTE [DISTWIDTH] IN BLOOD BY AUTOMATED COUNT: 18.6 % (ref 11.6–15.1)
GFR SERPL CREATININE-BSD FRML MDRD: 97 ML/MIN/1.73SQ M
GLUCOSE SERPL-MCNC: 69 MG/DL (ref 65–140)
GLUCOSE UR STRIP-MCNC: NEGATIVE MG/DL
HCT VFR BLD AUTO: 41.1 % (ref 36.5–49.3)
HGB BLD-MCNC: 11.9 G/DL (ref 12–17)
HGB UR QL STRIP.AUTO: ABNORMAL
IMM GRANULOCYTES # BLD AUTO: 0.08 THOUSAND/UL (ref 0–0.2)
IMM GRANULOCYTES NFR BLD AUTO: 1 % (ref 0–2)
INR PPP: 0.86 (ref 0.84–1.19)
KETONES UR STRIP-MCNC: ABNORMAL MG/DL
LACTATE SERPL-SCNC: 2 MMOL/L (ref 0.5–2)
LEUKOCYTE ESTERASE UR QL STRIP: NEGATIVE
LYMPHOCYTES # BLD AUTO: 1.37 THOUSANDS/ÂΜL (ref 0.6–4.47)
LYMPHOCYTES NFR BLD AUTO: 11 % (ref 14–44)
MCH RBC QN AUTO: 18.9 PG (ref 26.8–34.3)
MCHC RBC AUTO-ENTMCNC: 29 G/DL (ref 31.4–37.4)
MCV RBC AUTO: 65 FL (ref 82–98)
MONOCYTES # BLD AUTO: 1.08 THOUSAND/ÂΜL (ref 0.17–1.22)
MONOCYTES NFR BLD AUTO: 9 % (ref 4–12)
MUCOUS THREADS UR QL AUTO: ABNORMAL
NEUTROPHILS # BLD AUTO: 9.12 THOUSANDS/ÂΜL (ref 1.85–7.62)
NEUTS SEG NFR BLD AUTO: 76 % (ref 43–75)
NITRITE UR QL STRIP: NEGATIVE
NON-SQ EPI CELLS URNS QL MICRO: ABNORMAL /HPF
NRBC BLD AUTO-RTO: 0 /100 WBCS
P AXIS: 46 DEGREES
PH UR STRIP.AUTO: 6 [PH]
PLATELET # BLD AUTO: 485 THOUSANDS/UL (ref 149–390)
PMV BLD AUTO: 8.6 FL (ref 8.9–12.7)
POTASSIUM SERPL-SCNC: 4.7 MMOL/L (ref 3.5–5.3)
PR INTERVAL: 168 MS
PROCALCITONIN SERPL-MCNC: <0.05 NG/ML
PROT SERPL-MCNC: 8.6 G/DL (ref 6.4–8.4)
PROT UR STRIP-MCNC: ABNORMAL MG/DL
PROTHROMBIN TIME: 11.8 SECONDS (ref 11.6–14.5)
QRS AXIS: -8 DEGREES
QRSD INTERVAL: 84 MS
QT INTERVAL: 358 MS
QTC INTERVAL: 435 MS
RBC # BLD AUTO: 6.29 MILLION/UL (ref 3.88–5.62)
RBC #/AREA URNS AUTO: ABNORMAL /HPF
SODIUM SERPL-SCNC: 142 MMOL/L (ref 135–147)
SP GR UR STRIP.AUTO: 1.02 (ref 1–1.03)
T WAVE AXIS: 2 DEGREES
UROBILINOGEN UR QL STRIP.AUTO: 0.2 E.U./DL
VENTRICULAR RATE: 89 BPM
WBC # BLD AUTO: 12.05 THOUSAND/UL (ref 4.31–10.16)
WBC #/AREA URNS AUTO: ABNORMAL /HPF

## 2022-11-13 RX ORDER — OXYCODONE HYDROCHLORIDE AND ACETAMINOPHEN 5; 325 MG/1; MG/1
1 TABLET ORAL ONCE
Status: COMPLETED | OUTPATIENT
Start: 2022-11-13 | End: 2022-11-13

## 2022-11-13 RX ORDER — HYDROMORPHONE HCL 110MG/55ML
1.5 PATIENT CONTROLLED ANALGESIA SYRINGE INTRAVENOUS ONCE
Status: COMPLETED | OUTPATIENT
Start: 2022-11-13 | End: 2022-11-13

## 2022-11-13 RX ORDER — METHYLPREDNISOLONE SODIUM SUCCINATE 125 MG/2ML
125 INJECTION, POWDER, LYOPHILIZED, FOR SOLUTION INTRAMUSCULAR; INTRAVENOUS ONCE
Status: COMPLETED | OUTPATIENT
Start: 2022-11-13 | End: 2022-11-13

## 2022-11-13 RX ORDER — PREDNISONE 20 MG/1
40 TABLET ORAL ONCE
Status: DISCONTINUED | OUTPATIENT
Start: 2022-11-13 | End: 2022-11-13

## 2022-11-13 RX ORDER — ONDANSETRON 2 MG/ML
INJECTION INTRAMUSCULAR; INTRAVENOUS
Status: COMPLETED
Start: 2022-11-13 | End: 2022-11-13

## 2022-11-13 RX ORDER — PREDNISONE 20 MG/1
40 TABLET ORAL DAILY
Qty: 10 TABLET | Refills: 0 | Status: SHIPPED | OUTPATIENT
Start: 2022-11-13 | End: 2022-11-18

## 2022-11-13 RX ORDER — HYDROMORPHONE HCL/PF 1 MG/ML
1 SYRINGE (ML) INJECTION ONCE
Status: COMPLETED | OUTPATIENT
Start: 2022-11-13 | End: 2022-11-13

## 2022-11-13 RX ORDER — ONDANSETRON 4 MG/1
4 TABLET, FILM COATED ORAL EVERY 6 HOURS
Qty: 12 TABLET | Refills: 0 | Status: SHIPPED | OUTPATIENT
Start: 2022-11-13 | End: 2022-11-23

## 2022-11-13 RX ADMIN — ONDANSETRON 4 MG: 2 INJECTION INTRAMUSCULAR; INTRAVENOUS at 16:54

## 2022-11-13 RX ADMIN — HYDROMORPHONE HYDROCHLORIDE 1.5 MG: 2 INJECTION, SOLUTION INTRAMUSCULAR; INTRAVENOUS; SUBCUTANEOUS at 16:54

## 2022-11-13 RX ADMIN — OXYCODONE HYDROCHLORIDE AND ACETAMINOPHEN 1 TABLET: 5; 325 TABLET ORAL at 19:49

## 2022-11-13 RX ADMIN — METHYLPREDNISOLONE SODIUM SUCCINATE 125 MG: 125 INJECTION, POWDER, FOR SOLUTION INTRAMUSCULAR; INTRAVENOUS at 18:21

## 2022-11-13 RX ADMIN — SODIUM CHLORIDE 1000 ML: 0.9 INJECTION, SOLUTION INTRAVENOUS at 16:58

## 2022-11-13 RX ADMIN — OXYCODONE HYDROCHLORIDE AND ACETAMINOPHEN 1 TABLET: 5; 325 TABLET ORAL at 18:22

## 2022-11-13 RX ADMIN — HYDROMORPHONE HYDROCHLORIDE 1 MG: 1 INJECTION, SOLUTION INTRAMUSCULAR; INTRAVENOUS; SUBCUTANEOUS at 19:17

## 2022-11-13 NOTE — ED PROVIDER NOTES
History  Chief Complaint   Patient presents with   • Abdominal Pain     Pain in iliostomy, here two days ago for same  Also very nauseated     HPI  Patient is a 77-year-old male with complex medical history including IBS status post colectomy with J-pouch formation, proctitis, multiple bouts of obstruction and partial obstruction, recent surgery for diverting ileostomy complicated by prolapse and localize intra-abdominal infection presenting for evaluation of pain around his ileostomy site  Patient states severe burning/razor like sensation around the outside of ileostomy site starting earlier in the day today with some associated nausea  Patient denies any generalized abdominal pain beyond his baseline, denies abdominal distension  Patient states that he has had continued output into ileostomy which has been nonbloody  Patient denies fevers, chills, fatigue, constitutional symptoms  Patient was evaluated 2 days ago in this emergency department for similar symptoms  Prior to Admission Medications   Prescriptions Last Dose Informant Patient Reported? Taking? DULoxetine (CYMBALTA) 60 mg delayed release capsule   No No   Sig: Take 1 capsule (60 mg total) by mouth daily   HYDROmorphone (DILAUDID) 2 mg tablet   No No   Sig: Take 3 tablets (6 mg total) by mouth every 6 (six) hours as needed for severe pain (less if possible) Max Daily Amount: 24 mg   levofloxacin (LEVAQUIN) 500 mg tablet   Yes No   loperamide (IMODIUM) 2 mg capsule   Yes No   Sig: Take 2 mg by mouth 4 (four) times a day as needed   naloxone (NARCAN) 4 mg/0 1 mL nasal spray   No No   Sig: Administer 1 spray into a nostril  If no response after 2-3 minutes, give another dose in the other nostril using a new spray  naloxone (NARCAN) 4 mg/0 1 mL nasal spray   Yes No   Sig: Spray into one nostril if excess sedation  Call 911  Repeat every 2-3 min in alternate nostril until awake or EMS arrives     nystatin (MYCOSTATIN) powder   Yes No   Sig: Apply topically   pantoprazole (PROTONIX) 40 mg tablet   No No   Sig: Take 1 tablet (40 mg total) by mouth 2 (two) times a day   predniSONE 10 mg tablet Not Taking at Unknown time  No No   Sig: Take 4 tablets (40 mg total) by mouth daily for 7 days, THEN 3 5 tablets (35 mg total) daily for 7 days, THEN 3 tablets (30 mg total) daily for 7 days, THEN 2 5 tablets (25 mg total) daily for 7 days, THEN 2 tablets (20 mg total) daily for 7 days, THEN 1 5 tablets (15 mg total) daily for 7 days, THEN 1 tablet (10 mg total) daily for 7 days, THEN 0 5 tablets (5 mg total) daily for 7 days  Patient not taking: Reported on 11/13/2022   saccharomyces boulardii (FLORASTOR) 250 mg capsule   No No   Sig: Take 1 capsule (250 mg total) by mouth 2 (two) times a day      Facility-Administered Medications: None       Past Medical History:   Diagnosis Date   • Ankylosing spondylitis (HCC)    • Anxiety    • Bowel obstruction (HCC)    • Clostridium difficile colitis 9/13/2018   • Colitis    • 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[facility] [address] ) 9/13/2018   • Pancreatitis    • Ulcerative colitis ([facility] [address] )        Past Surgical History:   Procedure Laterality Date   • COLECTOMY TOTAL      with ileal pouch and anastemosis   • IR PICC PLACEMENT SINGLE LUMEN  3/1/2022   • TOTAL COLECTOMY         History reviewed  No pertinent family history  I have reviewed and agree with the history as documented  E-Cigarette/Vaping   • E-Cigarette Use Never User      E-Cigarette/Vaping Substances   • Nicotine No    • THC No    • CBD No    • Flavoring No    • Other No    • Unknown No      Social History     Tobacco Use   • Smoking status: Never Smoker   • Smokeless tobacco: Never Used   Vaping Use   • Vaping Use: Never used   Substance Use Topics   • Alcohol use: Not Currently     Comment: pt states 5 a month/socially   • Drug use: No       Review of Systems   Constitutional: Negative for chills, fatigue and fever  HENT: Negative for congestion, rhinorrhea and sore throat      Eyes: Negative for photophobia and visual disturbance  Respiratory: Negative for chest tightness and shortness of breath  Cardiovascular: Negative for chest pain, palpitations and leg swelling  Gastrointestinal: Positive for abdominal pain and nausea  Negative for abdominal distention, diarrhea and vomiting  Endocrine: Negative for polydipsia and polyuria  Genitourinary: Negative for dysuria and hematuria  Musculoskeletal: Negative for arthralgias and myalgias  Skin: Negative for color change, pallor, rash and wound  Neurological: Negative for weakness, numbness and headaches  Psychiatric/Behavioral: Negative for confusion  Physical Exam  Physical Exam  Vitals and nursing note reviewed  Constitutional:       General: He is not in acute distress  Appearance: He is well-developed  He is not diaphoretic  Comments: Initially uncomfortable appearing but nondistressed   HENT:      Head: Normocephalic and atraumatic  Comments: Moist mucous membranes     Right Ear: External ear normal       Left Ear: External ear normal       Nose: Nose normal       Mouth/Throat:      Pharynx: No oropharyngeal exudate  Eyes:      Conjunctiva/sclera: Conjunctivae normal       Pupils: Pupils are equal, round, and reactive to light  Cardiovascular:      Rate and Rhythm: Normal rate and regular rhythm  Heart sounds: Normal heart sounds  No murmur heard  No friction rub  No gallop  Pulmonary:      Effort: Pulmonary effort is normal  No respiratory distress  Breath sounds: Normal breath sounds  No wheezing  Chest:      Chest wall: No tenderness  Abdominal:      General: Bowel sounds are normal  There is no distension  Palpations: Abdomen is soft  There is no mass  Tenderness: There is no abdominal tenderness  There is no guarding or rebound  Comments: Grossly normal appearing ileostomy site with pink well perfused mucosa    Generalized mild abdominal tenderness without rigidity, rebound, guarding  Musculoskeletal:         General: No deformity  Skin:     General: Skin is warm and dry  Capillary Refill: Capillary refill takes less than 2 seconds  Neurological:      Mental Status: He is alert and oriented to person, place, and time        Comments: AAO x3   Psychiatric:         Behavior: Behavior normal          Vital Signs  ED Triage Vitals [11/13/22 1629]   Temperature Pulse Respirations Blood Pressure SpO2   98 8 °F (37 1 °C) (!) 111 (!) 24 129/87 98 %      Temp Source Heart Rate Source Patient Position - Orthostatic VS BP Location FiO2 (%)   Tympanic Monitor Sitting Right arm --      Pain Score       10 - Worst Possible Pain           Vitals:    11/13/22 1629 11/13/22 1825 11/13/22 1830   BP: 129/87 121/76 121/76   Pulse: (!) 111 90 84   Patient Position - Orthostatic VS: Sitting           Visual Acuity      ED Medications  Medications   sodium chloride 0 9 % bolus 1,000 mL (0 mL Intravenous Stopped 11/13/22 2000)   HYDROmorphone (DILAUDID) injection 1 5 mg (1 5 mg Intravenous Given 11/13/22 1654)   ondansetron (ZOFRAN) 4 mg/2 mL injection **ADS Override Pull** (4 mg  Given 11/13/22 1654)   oxyCODONE-acetaminophen (PERCOCET) 5-325 mg per tablet 1 tablet (1 tablet Oral Given 11/13/22 1822)   methylPREDNISolone sodium succinate (Solu-MEDROL) injection 125 mg (125 mg Intravenous Given 11/13/22 1821)   HYDROmorphone (DILAUDID) injection 1 mg (1 mg Intravenous Given 11/13/22 1917)   oxyCODONE-acetaminophen (PERCOCET) 5-325 mg per tablet 1 tablet (1 tablet Oral Given 11/13/22 1949)       Diagnostic Studies  Results Reviewed     Procedure Component Value Units Date/Time    Urine Microscopic [[id number]]  (Abnormal) Collected: 11/13/22 1752    Lab Status: Final result Specimen: Urine, Other Updated: 11/13/22 1843     RBC, UA 2-4 /hpf      WBC, UA 0-1 /hpf      Epithelial Cells None Seen /hpf      Bacteria, UA Occasional /hpf      Ca Oxalate Allegra, UA Occasional /hpf      MUCUS THREADS Innumerable UA w Reflex to Microscopic w Reflex to Culture [[id number]]  (Abnormal) Collected: 11/13/22 1752    Lab Status: Final result Specimen: Urine, Other Updated: 11/13/22 1810     Color, UA Yellow     Clarity, UA Clear     Specific Gravity, UA 1 025     pH, UA 6 0     Leukocytes, UA Negative     Nitrite, UA Negative     Protein, UA Trace mg/dl      Glucose, UA Negative mg/dl      Ketones, UA Trace mg/dl      Urobilinogen, UA 0 2 E U /dl      Bilirubin, UA Small     Occult Blood, UA Trace-Intact    Blood culture #1 [[id number]] Collected: 11/13/22 1753    Lab Status: In process Specimen: Blood from Arm, Left Updated: 11/13/22 1804    Procalcitonin [[id number]]  (Normal) Collected: 11/13/22 1654    Lab Status: Final result Specimen: Blood from Line, Venous Updated: 11/13/22 1732     Procalcitonin <0 05 ng/ml     Lactic acid [[id number]]  (Normal) Collected: 11/13/22 1654    Lab Status: Final result Specimen: Blood from Line, Venous Updated: 11/13/22 1725     LACTIC ACID 2 0 mmol/L     Narrative:      Result may be elevated if tourniquet was used during collection      Comprehensive metabolic panel [[id number]]  (Abnormal) Collected: 11/13/22 1654    Lab Status: Final result Specimen: Blood from Line, Venous Updated: 11/13/22 1724     Sodium 142 mmol/L      Potassium 4 7 mmol/L      Chloride 103 mmol/L      CO2 29 mmol/L      ANION GAP 10 mmol/L      BUN 10 mg/dL      Creatinine 1 03 mg/dL      Glucose 69 mg/dL      Calcium 9 7 mg/dL      AST --     ALT 44 U/L      Alkaline Phosphatase 124 U/L      Total Protein 8 6 g/dL      Albumin 4 1 g/dL      Total Bilirubin 0 40 mg/dL      eGFR 97 ml/min/1 73sq m     Narrative:      [place] guidelines for Chronic Kidney Disease (CKD):   •  Stage 1 with normal or high GFR (GFR > 90 mL/min/1 73 square meters)  •  Stage 2 Mild CKD (GFR = 60-89 mL/min/1 73 square meters)  •  Stage 3A Moderate CKD (GFR = 45-59 mL/min/1 73 square meters)  •  Stage 3B Moderate CKD (GFR = 30-44 mL/min/1 73 square meters)  •  Stage 4 Severe CKD (GFR = 15-29 mL/min/1 73 square meters)  •  Stage 5 End Stage CKD (GFR <15 mL/min/1 73 square meters)  Note: GFR calculation is accurate only with a steady state creatinine    Protime-INR [[id number]]  (Normal) Collected: 11/13/22 1654    Lab Status: Final result Specimen: Blood from Line, Venous Updated: 11/13/22 1717     Protime 11 8 seconds      INR 0 86    APTT [[id number]]  (Normal) Collected: 11/13/22 1654    Lab Status: Final result Specimen: Blood from Line, Venous Updated: 11/13/22 1716     PTT 29 seconds     Blood culture #2 [[id number]] Collected: 11/13/22 1654    Lab Status: In process Specimen: Blood from Line, Venous Updated: 11/13/22 1708    CBC and differential [[id number]]  (Abnormal) Collected: 11/13/22 1654    Lab Status: Final result Specimen: Blood from Line, Venous Updated: 11/13/22 1705     WBC 12 05 Thousand/uL      RBC 6 29 Million/uL      Hemoglobin 11 9 g/dL      Hematocrit 41 1 %      MCV 65 fL      MCH 18 9 pg      MCHC 29 0 g/dL      RDW 18 6 %      MPV 8 6 fL      Platelets 113 Thousands/uL      nRBC 0 /100 WBCs      Neutrophils Relative 76 %      Immat GRANS % 1 %      Lymphocytes Relative 11 %      Monocytes Relative 9 %      Eosinophils Relative 2 %      Basophils Relative 1 %      Neutrophils Absolute 9 12 Thousands/µL      Immature Grans Absolute 0 08 Thousand/uL      Lymphocytes Absolute 1 37 Thousands/µL      Monocytes Absolute 1 08 Thousand/µL      Eosinophils Absolute 0 27 Thousand/µL      Basophils Absolute 0 13 Thousands/µL                  No orders to display              Procedures  Procedures         ED Course                               SBIRT 20yo+    Flowsheet Row Most Recent Value   SBIRT (25 yo +)    In order to provide better care to our patients, we are screening all of our patients for alcohol and drug use  Would it be okay to ask you these screening questions?  No Filed at: 11/13/2022 9908 MDM  Number of Diagnoses or Management Options  Abdominal pain  Nausea  Diagnosis management comments: Presentation for abdominal pain, numerous prior presentations for same  Patient afebrile, tachycardic but otherwise normal vital signs  Infectious evaluation remarkable only for elevated white blood cell count of 12  Patient with grossly benign abdominal examination  Treated initially with 1 5 mg Dilaudid given high pain tolerance with significant improvement of symptoms  Shared decision making performed and given patient's benign abdominal examination, recent imaging, improvement of pain, CT scan deferred  Patient observed for prolonged period in emergency department with slight return of pain however remaining relatively comfortable  Provided with reassurance, instructions to follow-up with his surgeon, discharged with verbal and written return precautions  Disposition  Final diagnoses:   Abdominal pain   Nausea     Time reflects when diagnosis was documented in both MDM as applicable and the Disposition within this note     Time User Action Codes Description Comment    11/13/2022  7:02 PM Naomi Galvin Add [R10 9] Abdominal pain     11/13/2022  7:04 PM Naomi Galvin Add [R11 0] Nausea       ED Disposition     ED Disposition   Discharge    Condition   Stable    Date/Time   Sun Nov 13, 2022  7:02 PM    Comment   Tc Fitting discharge to home/self care                 Follow-up Information     Follow up With Specialties Details Why Contact Info Additional Information    [address] [facility] Emergency Department Emergency Medicine  If symptoms worsen [address] [facility] [id number] [address] [address] Emergency Department, [facility] [place], [address]          Discharge Medication List as of 11/13/2022  7:04 PM      START taking these medications    Details   ondansetron (ZOFRAN) 4 mg tablet Take 1 tablet (4 mg total) by mouth every 6 (six) hours, Starting Sun 11/13/2022, Normal      !! predniSONE 20 mg tablet Take 2 tablets (40 mg total) by mouth daily for 5 days, Starting Sun 11/13/2022, Until Fri 11/18/2022, Normal       !! - Potential duplicate medications found  Please discuss with provider  CONTINUE these medications which have NOT CHANGED    Details   DULoxetine (CYMBALTA) 60 mg delayed release capsule Take 1 capsule (60 mg total) by mouth daily, Starting Fri 6/10/2022, Normal      HYDROmorphone (DILAUDID) 2 mg tablet Take 3 tablets (6 mg total) by mouth every 6 (six) hours as needed for severe pain (less if possible) Max Daily Amount: 24 mg, Starting Mon 11/7/2022, Normal      levofloxacin (LEVAQUIN) 500 mg tablet Starting Wed 10/26/2022, Historical Med      loperamide (IMODIUM) 2 mg capsule Take 2 mg by mouth 4 (four) times a day as needed, Starting Thu 11/3/2022, Until Sun 11/13/2022 at 2359, Historical Med      !! naloxone (NARCAN) 4 mg/0 1 mL nasal spray Administer 1 spray into a nostril  If no response after 2-3 minutes, give another dose in the other nostril using a new spray , Normal      !! naloxone (NARCAN) 4 mg/0 1 mL nasal spray Spray into one nostril if excess sedation  Call 911   Repeat every 2-3 min in alternate nostril until awake or EMS arrives , Historical Med      nystatin (MYCOSTATIN) powder Apply topically, Starting Thu 11/3/2022, Until Sun 11/13/2022 at 2359, Historical Med      pantoprazole (PROTONIX) 40 mg tablet Take 1 tablet (40 mg total) by mouth 2 (two) times a day, Starting Mon 9/26/2022, Until Wed 10/26/2022, Normal      !! predniSONE 10 mg tablet Multiple Dosages:Starting Fri 10/7/2022, Until Thu 10/13/2022 at 2359, THEN Starting Fri 10/14/2022, Until Thu 10/20/2022 at 2359, [address] Starting Fri 10/21/2022, Until Thu 10/27/2022 at 2359, THEN Starting Fri 10/28/2022, Until Thu 11/3/2022 at 2359,  THEN Starting Fri 11/4/2022, Until u 11/10/2022 at 2359, THEN Starting Fri 11/11/2022, Until Thu 11/17/2022 at 2359, THEN Starting Fri 11/18/2022, Until Thu 11/24/2022 at 2359, THEN Starting Fri 11/25/2022, Until Thu 12/1/2022 at 2359Take 4 tablet s (40 mg total) by mouth daily for 7 days, THEN 3 5 tablets (35 mg total) daily for 7 days, THEN 3 tablets (30 mg total) daily for 7 days, THEN 2 5 tablets (25 mg total) daily for 7 days, THEN 2 tablets (20 mg total) daily for 7 days, THEN 1 5 tablets (1 5 mg total) daily for 7 days, THEN 1 tablet (10 mg total) daily for 7 days, THEN 0 5 tablets (5 mg total) daily for 7 days  , No Print      saccharomyces boulardii (FLORASTOR) 250 mg capsule Take 1 capsule (250 mg total) by mouth 2 (two) times a day, Starting Sat 8/27/2022, Normal       !! - Potential duplicate medications found  Please discuss with provider  No discharge procedures on file      PDMP Review       Value Time User    PDMP Reviewed  Yes 11/7/2022  5:49 PM Abiel Moreno DO          ED Provider  Electronically Signed by           Alexia Mata MD  11/13/22 8517

## 2022-11-14 NOTE — DISCHARGE INSTRUCTIONS
If you have any severe worsening pain, abdominal distension, decreased output in the ostomy, fevers, chills, return to the emergency department  Use prednisone for the next 5 days  Use Zofran for nausea  Follow-up is scheduled with surgeon

## 2022-11-18 ENCOUNTER — APPOINTMENT (EMERGENCY)
Dept: RADIOLOGY | Facility: HOSPITAL | Age: 27
End: 2022-11-18

## 2022-11-18 ENCOUNTER — HOSPITAL ENCOUNTER (EMERGENCY)
Facility: HOSPITAL | Age: 27
Discharge: HOME | End: 2022-11-18
Attending: EMERGENCY MEDICINE

## 2022-11-18 VITALS
TEMPERATURE: 98.3 F | SYSTOLIC BLOOD PRESSURE: 115 MMHG | DIASTOLIC BLOOD PRESSURE: 66 MMHG | BODY MASS INDEX: 24.37 KG/M2 | HEART RATE: 91 BPM | RESPIRATION RATE: 20 BRPM | OXYGEN SATURATION: 98 % | WEIGHT: 165 LBS

## 2022-11-18 VITALS
RESPIRATION RATE: 22 BRPM | OXYGEN SATURATION: 96 % | TEMPERATURE: 98.7 F | DIASTOLIC BLOOD PRESSURE: 69 MMHG | HEART RATE: 106 BPM | SYSTOLIC BLOOD PRESSURE: 112 MMHG

## 2022-11-18 DIAGNOSIS — R10.9 ABDOMINAL PAIN, UNSPECIFIED ABDOMINAL LOCATION: Primary | ICD-10-CM

## 2022-11-18 DIAGNOSIS — R10.9 ABDOMINAL PAIN: Primary | ICD-10-CM

## 2022-11-18 LAB
ALBUMIN SERPL BCP-MCNC: 3.7 G/DL (ref 3.5–5)
ALBUMIN SERPL BCP-MCNC: 3.8 G/DL (ref 3.5–5)
ALP SERPL-CCNC: 108 U/L (ref 46–116)
ALP SERPL-CCNC: 123 U/L (ref 46–116)
ALT SERPL W P-5'-P-CCNC: 49 U/L (ref 12–78)
ALT SERPL W P-5'-P-CCNC: 60 U/L (ref 12–78)
ANION GAP SERPL CALCULATED.3IONS-SCNC: 7 MMOL/L (ref 4–13)
ANION GAP SERPL CALCULATED.3IONS-SCNC: 8 MMOL/L (ref 4–13)
APTT PPP: 26 SECONDS (ref 23–37)
BACTERIA BLD CULT: NORMAL
BACTERIA BLD CULT: NORMAL
BASOPHILS # BLD AUTO: 0.1 THOUSANDS/ÂΜL (ref 0–0.1)
BASOPHILS # BLD AUTO: 0.14 THOUSANDS/ÂΜL (ref 0–0.1)
BASOPHILS NFR BLD AUTO: 1 % (ref 0–1)
BASOPHILS NFR BLD AUTO: 1 % (ref 0–1)
BILIRUB SERPL-MCNC: 0.39 MG/DL (ref 0.2–1)
BILIRUB SERPL-MCNC: 0.64 MG/DL (ref 0.2–1)
BUN SERPL-MCNC: 10 MG/DL (ref 5–25)
BUN SERPL-MCNC: 9 MG/DL (ref 5–25)
CALCIUM SERPL-MCNC: 9 MG/DL (ref 8.3–10.1)
CALCIUM SERPL-MCNC: 9.2 MG/DL (ref 8.3–10.1)
CHLORIDE SERPL-SCNC: 104 MMOL/L (ref 96–108)
CHLORIDE SERPL-SCNC: 104 MMOL/L (ref 96–108)
CO2 SERPL-SCNC: 28 MMOL/L (ref 21–32)
CO2 SERPL-SCNC: 29 MMOL/L (ref 21–32)
CREAT SERPL-MCNC: 1.04 MG/DL (ref 0.6–1.3)
CREAT SERPL-MCNC: 1.06 MG/DL (ref 0.6–1.3)
EOSINOPHIL # BLD AUTO: 0.5 THOUSAND/ÂΜL (ref 0–0.61)
EOSINOPHIL # BLD AUTO: 0.56 THOUSAND/ÂΜL (ref 0–0.61)
EOSINOPHIL NFR BLD AUTO: 3 % (ref 0–6)
EOSINOPHIL NFR BLD AUTO: 4 % (ref 0–6)
ERYTHROCYTE [DISTWIDTH] IN BLOOD BY AUTOMATED COUNT: 16.9 % (ref 11.6–15.1)
ERYTHROCYTE [DISTWIDTH] IN BLOOD BY AUTOMATED COUNT: 17.5 % (ref 11.6–15.1)
GFR SERPL CREATININE-BSD FRML MDRD: 94 ML/MIN/1.73SQ M
GFR SERPL CREATININE-BSD FRML MDRD: 96 ML/MIN/1.73SQ M
GLUCOSE SERPL-MCNC: 83 MG/DL (ref 65–140)
GLUCOSE SERPL-MCNC: 99 MG/DL (ref 65–140)
HCT VFR BLD AUTO: 35.5 % (ref 36.5–49.3)
HCT VFR BLD AUTO: 37.8 % (ref 36.5–49.3)
HGB BLD-MCNC: 10.5 G/DL (ref 12–17)
HGB BLD-MCNC: 11.3 G/DL (ref 12–17)
IMM GRANULOCYTES # BLD AUTO: 0.11 THOUSAND/UL (ref 0–0.2)
IMM GRANULOCYTES # BLD AUTO: 0.12 THOUSAND/UL (ref 0–0.2)
IMM GRANULOCYTES NFR BLD AUTO: 1 % (ref 0–2)
IMM GRANULOCYTES NFR BLD AUTO: 1 % (ref 0–2)
INR PPP: 0.96 (ref 0.84–1.19)
LACTATE SERPL-SCNC: 1.5 MMOL/L (ref 0.5–2)
LIPASE SERPL-CCNC: 117 U/L (ref 73–393)
LYMPHOCYTES # BLD AUTO: 1.17 THOUSANDS/ÂΜL (ref 0.6–4.47)
LYMPHOCYTES # BLD AUTO: 2 THOUSANDS/ÂΜL (ref 0.6–4.47)
LYMPHOCYTES NFR BLD AUTO: 15 % (ref 14–44)
LYMPHOCYTES NFR BLD AUTO: 7 % (ref 14–44)
MCH RBC QN AUTO: 19 PG (ref 26.8–34.3)
MCH RBC QN AUTO: 19.1 PG (ref 26.8–34.3)
MCHC RBC AUTO-ENTMCNC: 29.6 G/DL (ref 31.4–37.4)
MCHC RBC AUTO-ENTMCNC: 29.9 G/DL (ref 31.4–37.4)
MCV RBC AUTO: 64 FL (ref 82–98)
MCV RBC AUTO: 64 FL (ref 82–98)
MONOCYTES # BLD AUTO: 1.28 THOUSAND/ÂΜL (ref 0.17–1.22)
MONOCYTES # BLD AUTO: 1.36 THOUSAND/ÂΜL (ref 0.17–1.22)
MONOCYTES NFR BLD AUTO: 10 % (ref 4–12)
MONOCYTES NFR BLD AUTO: 9 % (ref 4–12)
NEUTROPHILS # BLD AUTO: 12.64 THOUSANDS/ÂΜL (ref 1.85–7.62)
NEUTROPHILS # BLD AUTO: 9.09 THOUSANDS/ÂΜL (ref 1.85–7.62)
NEUTS SEG NFR BLD AUTO: 69 % (ref 43–75)
NEUTS SEG NFR BLD AUTO: 79 % (ref 43–75)
NRBC BLD AUTO-RTO: 0 /100 WBCS
NRBC BLD AUTO-RTO: 0 /100 WBCS
PLATELET # BLD AUTO: 403 THOUSANDS/UL (ref 149–390)
PLATELET # BLD AUTO: 478 THOUSANDS/UL (ref 149–390)
PMV BLD AUTO: 8.5 FL (ref 8.9–12.7)
PMV BLD AUTO: 8.7 FL (ref 8.9–12.7)
POTASSIUM SERPL-SCNC: 3.8 MMOL/L (ref 3.5–5.3)
POTASSIUM SERPL-SCNC: 4.3 MMOL/L (ref 3.5–5.3)
PROT SERPL-MCNC: 7.3 G/DL (ref 6.4–8.4)
PROT SERPL-MCNC: 7.6 G/DL (ref 6.4–8.4)
PROTHROMBIN TIME: 12.9 SECONDS (ref 11.6–14.5)
RBC # BLD AUTO: 5.54 MILLION/UL (ref 3.88–5.62)
RBC # BLD AUTO: 5.93 MILLION/UL (ref 3.88–5.62)
SODIUM SERPL-SCNC: 140 MMOL/L (ref 135–147)
SODIUM SERPL-SCNC: 140 MMOL/L (ref 135–147)
WBC # BLD AUTO: 13.19 THOUSAND/UL (ref 4.31–10.16)
WBC # BLD AUTO: 15.88 THOUSAND/UL (ref 4.31–10.16)

## 2022-11-18 RX ORDER — OXYCODONE HYDROCHLORIDE 5 MG/1
5 TABLET ORAL ONCE
Status: COMPLETED | OUTPATIENT
Start: 2022-11-18 | End: 2022-11-18

## 2022-11-18 RX ORDER — HYDROMORPHONE HCL/PF 1 MG/ML
1 SYRINGE (ML) INJECTION ONCE
Status: COMPLETED | OUTPATIENT
Start: 2022-11-18 | End: 2022-11-18

## 2022-11-18 RX ORDER — ONDANSETRON 2 MG/ML
4 INJECTION INTRAMUSCULAR; INTRAVENOUS ONCE
Status: COMPLETED | OUTPATIENT
Start: 2022-11-18 | End: 2022-11-18

## 2022-11-18 RX ADMIN — ONDANSETRON 4 MG: 2 INJECTION INTRAMUSCULAR; INTRAVENOUS at 02:22

## 2022-11-18 RX ADMIN — HYDROMORPHONE HYDROCHLORIDE 1 MG: 1 INJECTION, SOLUTION INTRAMUSCULAR; INTRAVENOUS; SUBCUTANEOUS at 13:34

## 2022-11-18 RX ADMIN — IOHEXOL 50 ML: 240 INJECTION, SOLUTION INTRATHECAL; INTRAVASCULAR; INTRAVENOUS; ORAL at 12:45

## 2022-11-18 RX ADMIN — IOHEXOL 100 ML: 350 INJECTION, SOLUTION INTRAVENOUS at 14:44

## 2022-11-18 RX ADMIN — OXYCODONE HYDROCHLORIDE 5 MG: 5 TABLET ORAL at 03:56

## 2022-11-18 RX ADMIN — HYDROMORPHONE HYDROCHLORIDE 1 MG: 1 INJECTION, SOLUTION INTRAMUSCULAR; INTRAVENOUS; SUBCUTANEOUS at 15:16

## 2022-11-18 RX ADMIN — HYDROMORPHONE HYDROCHLORIDE 1 MG: 1 INJECTION, SOLUTION INTRAMUSCULAR; INTRAVENOUS; SUBCUTANEOUS at 12:34

## 2022-11-18 RX ADMIN — SODIUM CHLORIDE 1000 ML: 0.9 INJECTION, SOLUTION INTRAVENOUS at 12:32

## 2022-11-18 RX ADMIN — HYDROMORPHONE HYDROCHLORIDE 1 MG: 1 INJECTION, SOLUTION INTRAMUSCULAR; INTRAVENOUS; SUBCUTANEOUS at 02:08

## 2022-11-18 RX ADMIN — SODIUM CHLORIDE 1000 ML: 0.9 INJECTION, SOLUTION INTRAVENOUS at 02:14

## 2022-11-18 RX ADMIN — ONDANSETRON 4 MG: 2 INJECTION INTRAMUSCULAR; INTRAVENOUS at 12:33

## 2022-11-18 NOTE — ED PROVIDER NOTES
History  Chief Complaint   Patient presents with   • Abdominal Pain     Pt c/o burning at site as well as abd pain, decreased output and nausea  70-year-old male, history of ulcer colitis with multiple surgeries and complications, presenting with abdominal pain  Patient reports moderate, burning pain to colostomy site  Pain constant with no modifying factors, radiates across abdomen  Reports associated nausea and decreased output from colostomy, denies any fevers  History provided by:  Patient   used: No    Abdominal Pain  Associated symptoms: nausea    Associated symptoms: no fever        Prior to Admission Medications   Prescriptions Last Dose Informant Patient Reported? Taking? DULoxetine (CYMBALTA) 60 mg delayed release capsule   No No   Sig: Take 1 capsule (60 mg total) by mouth daily   HYDROmorphone (DILAUDID) 2 mg tablet   No No   Sig: Take 3 tablets (6 mg total) by mouth every 6 (six) hours as needed for severe pain (less if possible) Max Daily Amount: 24 mg   levofloxacin (LEVAQUIN) 500 mg tablet   Yes No   naloxone (NARCAN) 4 mg/0 1 mL nasal spray   No No   Sig: Administer 1 spray into a nostril  If no response after 2-3 minutes, give another dose in the other nostril using a new spray  naloxone (NARCAN) 4 mg/0 1 mL nasal spray   Yes No   Sig: Spray into one nostril if excess sedation  Call 911  Repeat every 2-3 min in alternate nostril until awake or EMS arrives     nystatin (MYCOSTATIN) powder   Yes No   Sig: Apply topically   ondansetron (ZOFRAN) 4 mg tablet   No No   Sig: Take 1 tablet (4 mg total) by mouth every 6 (six) hours   pantoprazole (PROTONIX) 40 mg tablet   No No   Sig: Take 1 tablet (40 mg total) by mouth 2 (two) times a day   predniSONE 10 mg tablet   No No   Sig: Take 4 tablets (40 mg total) by mouth daily for 7 days, THEN 3 5 tablets (35 mg total) daily for 7 days, THEN 3 tablets (30 mg total) daily for 7 days, THEN 2 5 tablets (25 mg total) daily for 7 days, THEN 2 tablets (20 mg total) daily for 7 days, THEN 1 5 tablets (15 mg total) daily for 7 days, THEN 1 tablet (10 mg total) daily for 7 days, THEN 0 5 tablets (5 mg total) daily for 7 days  Patient not taking: Reported on 11/13/2022   predniSONE 20 mg tablet   No No   Sig: Take 2 tablets (40 mg total) by mouth daily for 5 days   saccharomyces boulardii (FLORASTOR) 250 mg capsule   No No   Sig: Take 1 capsule (250 mg total) by mouth 2 (two) times a day      Facility-Administered Medications: None       Past Medical History:   Diagnosis Date   • Ankylosing spondylitis (HCC)    • Anxiety    • Bowel obstruction (HCC)    • Clostridium difficile colitis 9/13/2018   • Colitis    • 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[facility] ) 9/13/2018   • Pancreatitis    • Ulcerative colitis ([facility] )        Past Surgical History:   Procedure Laterality Date   • COLECTOMY TOTAL      with ileal pouch and anastemosis   • IR PICC PLACEMENT SINGLE LUMEN  3/1/2022   • TOTAL COLECTOMY         History reviewed  No pertinent family history  I have reviewed and agree with the history as documented  E-Cigarette/Vaping   • E-Cigarette Use Never User      E-Cigarette/Vaping Substances   • Nicotine No    • THC No    • CBD No    • Flavoring No    • Other No    • Unknown No      Social History     Tobacco Use   • Smoking status: Never   • Smokeless tobacco: Never   Vaping Use   • Vaping Use: Never used   Substance Use Topics   • Alcohol use: Not Currently     Comment: pt states 5 a month/socially   • Drug use: No       Review of Systems   Constitutional: Negative  Negative for fever  HENT: Negative  Eyes: Negative  Respiratory: Negative  Cardiovascular: Negative  Gastrointestinal: Positive for abdominal pain and nausea  Genitourinary: Negative  Musculoskeletal: Negative  Skin: Negative  Neurological: Negative  Hematological: Negative  Physical Exam  Physical Exam  Vitals and nursing note reviewed     Constitutional:       General: He is not in acute distress  HENT:      Head: Normocephalic and atraumatic  Mouth/Throat:      Mouth: Mucous membranes are moist       Pharynx: Oropharynx is clear  Eyes:      Extraocular Movements: Extraocular movements intact  Pupils: Pupils are equal, round, and reactive to light  Cardiovascular:      Rate and Rhythm: Regular rhythm  Tachycardia present  Pulmonary:      Effort: Pulmonary effort is normal       Breath sounds: Normal breath sounds  Abdominal:      Comments: Colostomy in place  Nondistended, soft, nontender  Decreased bowel sounds   Musculoskeletal:         General: Normal range of motion  Skin:     General: Skin is warm and dry  Neurological:      General: No focal deficit present  Mental Status: He is alert and oriented to person, place, and time           Vital Signs  ED Triage Vitals [11/18/22 0141]   Temp Pulse Respirations Blood Pressure SpO2   -- (!) 114 20 147/70 97 %      Temp src Heart Rate Source Patient Position - Orthostatic VS BP Location FiO2 (%)   -- Monitor Sitting Left arm --      Pain Score       9           Vitals:    11/18/22 0141   BP: 147/70   Pulse: (!) 114   Patient Position - Orthostatic VS: Sitting         Visual Acuity      ED Medications  Medications   sodium chloride 0 9 % bolus 1,000 mL (1,000 mL Intravenous New Bag 11/18/22 0214)   HYDROmorphone (DILAUDID) injection 1 mg (1 mg Intravenous Given 11/18/22 0208)   ondansetron (ZOFRAN) injection 4 mg (4 mg Intravenous Given 11/18/22 0222)       Diagnostic Studies  Results Reviewed     Procedure Component Value Units Date/Time    Comprehensive metabolic panel [[id number]]  (Abnormal) Collected: 11/18/22 0201    Lab Status: Final result Specimen: Blood from Arm, Right Updated: 11/18/22 0231     Sodium 140 mmol/L      Potassium 4 3 mmol/L      Chloride 104 mmol/L      CO2 29 mmol/L      ANION GAP 7 mmol/L      BUN 10 mg/dL      Creatinine 1 06 mg/dL      Glucose 99 mg/dL      Calcium 9 2 mg/dL      AST --     ALT 60 U/L      Alkaline Phosphatase 123 U/L      Total Protein 7 6 g/dL      Albumin 3 8 g/dL      Total Bilirubin 0 39 mg/dL      eGFR 94 ml/min/1 73sq m     Narrative:      [place] guidelines for Chronic Kidney Disease (CKD):   •  Stage 1 with normal or high GFR (GFR > 90 mL/min/1 73 square meters)  •  Stage 2 Mild CKD (GFR = 60-89 mL/min/1 73 square meters)  •  Stage 3A Moderate CKD (GFR = 45-59 mL/min/1 73 square meters)  •  Stage 3B Moderate CKD (GFR = 30-44 mL/min/1 73 square meters)  •  Stage 4 Severe CKD (GFR = 15-29 mL/min/1 73 square meters)  •  Stage 5 End Stage CKD (GFR <15 mL/min/1 73 square meters)  Note: GFR calculation is accurate only with a steady state creatinine    CBC and differential [[id number]]  (Abnormal) Collected: 11/18/22 0201    Lab Status: Final result Specimen: Blood from Arm, Right Updated: 11/18/22 0209     WBC 13 19 Thousand/uL      RBC 5 93 Million/uL      Hemoglobin 11 3 g/dL      Hematocrit 37 8 %      MCV 64 fL      MCH 19 1 pg      MCHC 29 9 g/dL      RDW 17 5 %      MPV 8 7 fL      Platelets 800 Thousands/uL      nRBC 0 /100 WBCs      Neutrophils Relative 69 %      Immat GRANS % 1 %      Lymphocytes Relative 15 %      Monocytes Relative 10 %      Eosinophils Relative 4 %      Basophils Relative 1 %      Neutrophils Absolute 9 09 Thousands/µL      Immature Grans Absolute 0 12 Thousand/uL      Lymphocytes Absolute 2 00 Thousands/µL      Monocytes Absolute 1 28 Thousand/µL      Eosinophils Absolute 0 56 Thousand/µL      Basophils Absolute 0 14 Thousands/µL                  XR abdomen obstruction series    (Results Pending)              Procedures  Procedures         ED Course  ED Course as of 11/18/22 0318 Fri Nov 18, 2022   6460 X-rays reviewed, no free air, nonobstructive bowel gas pattern   0313 Patient reports feeling better with pain medication and IV fluids  Noted to have some production of stool in colostomy bag    Abdomen soft and nontender repeat exam   Discussed with patient follow-up with his doctors, follow-up return for any worsening or new concerning symptoms  SBIRT 22yo+    Flowsheet Row Most Recent Value   SBIRT (25 yo +)    In order to provide better care to our patients, we are screening all of our patients for alcohol and drug use  Would it be okay to ask you these screening questions? Yes Filed at: 11/18/2022 3616   Initial Alcohol Screen: US AUDIT-C     1  How often do you have a drink containing alcohol? 0 Filed at: 11/18/2022 0215   2  How many drinks containing alcohol do you have on a typical day you are drinking? 0 Filed at: 11/18/2022 0215   3a  Male UNDER 65: How often do you have five or more drinks on one occasion? 0 Filed at: 11/18/2022 0215   Audit-C Score 0 Filed at: 11/18/2022 3072   ROD: How many times in the past year have you    Used an illegal drug or used a prescription medication for non-medical reasons? Never Filed at: 11/18/2022 0215                    MDM  Number of Diagnoses or Management Options  Diagnosis management comments: 29-year-old male, history of ulcerative colitis with multiple surgeries, colostomy in place, presenting with abdominal pain  Patient with frequent ED visits for abdominal pain  Differential diagnosis includes chronic pain, colitis, bowel obstruction among other diagnosis  Will give pain medication IV fluids, labs and x-ray ordered, monitor needing re-evaluate         Amount and/or Complexity of Data Reviewed  Clinical lab tests: ordered and reviewed  Tests in the radiology section of CPT®: ordered and reviewed  Review and summarize past medical records: yes  Independent visualization of images, tracings, or specimens: yes        Disposition  Final diagnoses:   Abdominal pain, unspecified abdominal location     Time reflects when diagnosis was documented in both MDM as applicable and the Disposition within this note     Time User Action Codes Description Comment    11/18/2022  3:18 AM Bipin Wisdom Add [R10 9] Abdominal pain, unspecified abdominal location       ED Disposition     ED Disposition   Discharge    Condition   Stable    Date/Time   Fri Nov 18, 2022  3:18 AM    Comment   Celestino Abad discharge to home/self care  Follow-up Information     Follow up With Specialties Details Why [address] [facility]   [address] [place] [address]   [phone]            Patient's Medications   Discharge Prescriptions    No medications on file       No discharge procedures on file      PDMP Review       Value Time User    PDMP Reviewed  Yes 11/7/2022  5:49 PM Maya Suarez DO          ED Provider  Electronically Signed by           Asha Izaguirre MD  11/18/22 7138

## 2022-11-19 NOTE — ED PROVIDER NOTES
History  Chief Complaint   Patient presents with   • Abdominal Pain     Here earlier today for abd pain has iliostomy and having pain in it, had obstruction series today already, c/o pain and nausea     27yoM hx mult abdominal surgeries most recently with iliostomy placed at [facility], reports increased pain and nausea today with decreased ostomy output  Prior to Admission Medications   Prescriptions Last Dose Informant Patient Reported? Taking? DULoxetine (CYMBALTA) 60 mg delayed release capsule   No No   Sig: Take 1 capsule (60 mg total) by mouth daily   HYDROmorphone (DILAUDID) 2 mg tablet   No No   Sig: Take 3 tablets (6 mg total) by mouth every 6 (six) hours as needed for severe pain (less if possible) Max Daily Amount: 24 mg   levofloxacin (LEVAQUIN) 500 mg tablet   Yes No   naloxone (NARCAN) 4 mg/0 1 mL nasal spray   No No   Sig: Administer 1 spray into a nostril  If no response after 2-3 minutes, give another dose in the other nostril using a new spray  naloxone (NARCAN) 4 mg/0 1 mL nasal spray   Yes No   Sig: Spray into one nostril if excess sedation  Call 911  Repeat every 2-3 min in alternate nostril until awake or EMS arrives  nystatin (MYCOSTATIN) powder   Yes No   Sig: Apply topically   ondansetron (ZOFRAN) 4 mg tablet   No No   Sig: Take 1 tablet (4 mg total) by mouth every 6 (six) hours   pantoprazole (PROTONIX) 40 mg tablet   No No   Sig: Take 1 tablet (40 mg total) by mouth 2 (two) times a day   predniSONE 10 mg tablet   No No   Sig: Take 4 tablets (40 mg total) by mouth daily for 7 days, THEN 3 5 tablets (35 mg total) daily for 7 days, THEN 3 tablets (30 mg total) daily for 7 days, THEN 2 5 tablets (25 mg total) daily for 7 days, THEN 2 tablets (20 mg total) daily for 7 days, THEN 1 5 tablets (15 mg total) daily for 7 days, THEN 1 tablet (10 mg total) daily for 7 days, THEN 0 5 tablets (5 mg total) daily for 7 days     Patient not taking: Reported on 11/13/2022   predniSONE 20 mg tablet No No   Sig: Take 2 tablets (40 mg total) by mouth daily for 5 days   saccharomyces boulardii (FLORASTOR) 250 mg capsule   No No   Sig: Take 1 capsule (250 mg total) by mouth 2 (two) times a day      Facility-Administered Medications: None       Past Medical History:   Diagnosis Date   • Ankylosing spondylitis (HCC)    • Anxiety    • Bowel obstruction ([facility])    • Clostridium difficile colitis 9/13/2018   • Colitis    • 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[facility] ) 9/13/2018   • Pancreatitis    • Ulcerative colitis ([facility] )        Past Surgical History:   Procedure Laterality Date   • COLECTOMY TOTAL      with ileal pouch and anastemosis   • IR PICC PLACEMENT SINGLE LUMEN  3/1/2022   • TOTAL COLECTOMY         History reviewed  No pertinent family history  I have reviewed and agree with the history as documented  E-Cigarette/Vaping   • E-Cigarette Use Never User      E-Cigarette/Vaping Substances   • Nicotine No    • THC No    • CBD No    • Flavoring No    • Other No    • Unknown No      Social History     Tobacco Use   • Smoking status: Never   • Smokeless tobacco: Never   Vaping Use   • Vaping Use: Never used   Substance Use Topics   • Alcohol use: Not Currently     Comment: pt states 5 a month/socially   • Drug use: No       Review of Systems   Constitutional: Negative for fever  Respiratory: Negative for cough  Gastrointestinal: Positive for abdominal pain  Musculoskeletal: Negative for back pain  Neurological: Negative for headaches  All other systems reviewed and are negative  Physical Exam  Physical Exam  Vitals reviewed  Constitutional:       Appearance: He is well-developed  HENT:      Head: Normocephalic and atraumatic  Right Ear: External ear normal       Left Ear: External ear normal       Nose: Nose normal  No rhinorrhea  Mouth/Throat:      Mouth: Mucous membranes are moist    Eyes:      Conjunctiva/sclera: Conjunctivae normal    Cardiovascular:      Rate and Rhythm: Normal rate and regular rhythm  Pulmonary:      Effort: Pulmonary effort is normal       Breath sounds: Normal breath sounds  No wheezing or rales  Abdominal:      Palpations: Abdomen is soft  Tenderness: There is abdominal tenderness in the right upper quadrant and right lower quadrant  Comments: Ostomy in place minimal output   Musculoskeletal:      Cervical back: Neck supple  Right lower leg: No edema  Left lower leg: No edema  Skin:     General: Skin is warm and dry  Neurological:      Mental Status: He is alert and oriented to person, place, and time     Psychiatric:         Mood and Affect: Mood normal          Vital Signs  ED Triage Vitals [11/18/22 1118]   Temperature Pulse Respirations Blood Pressure SpO2   98 7 °F (37 1 °C) (!) 106 22 112/69 96 %      Temp Source Heart Rate Source Patient Position - Orthostatic VS BP Location FiO2 (%)   Tympanic Monitor Sitting Right arm --      Pain Score       8           Vitals:    11/18/22 1118   BP: 112/69   Pulse: (!) 106   Patient Position - Orthostatic VS: Sitting         Visual Acuity      ED Medications  Medications   sodium chloride 0 9 % bolus 1,000 mL (0 mL Intravenous Stopped 11/18/22 1709)   iohexol (OMNIPAQUE) 240 MG/ML solution 50 mL (50 mL Oral Given 11/18/22 1245)   HYDROmorphone (DILAUDID) injection 1 mg (1 mg Intravenous Given 11/18/22 1234)   ondansetron (ZOFRAN) injection 4 mg (4 mg Intravenous Given 11/18/22 1233)   HYDROmorphone (DILAUDID) injection 1 mg (1 mg Intravenous Given 11/18/22 1334)   iohexol (OMNIPAQUE) 350 MG/ML injection (SINGLE-DOSE) 100 mL (100 mL Intravenous Given 11/18/22 1444)   HYDROmorphone (DILAUDID) injection 1 mg (1 mg Intravenous Given 11/18/22 1516)       Diagnostic Studies  Results Reviewed     Procedure Component Value Units Date/Time    Comprehensive metabolic panel [[id number]] Collected: 11/18/22 1232    Lab Status: Final result Specimen: Blood from Arm, Right Updated: 11/18/22 1302     Sodium 140 mmol/L      Potassium 3 8 mmol/L      Chloride 104 mmol/L      CO2 28 mmol/L      ANION GAP 8 mmol/L      BUN 9 mg/dL      Creatinine 1 04 mg/dL      Glucose 83 mg/dL      Calcium 9 0 mg/dL      AST --     ALT 49 U/L      Alkaline Phosphatase 108 U/L      Total Protein 7 3 g/dL      Albumin 3 7 g/dL      Total Bilirubin 0 64 mg/dL      eGFR 96 ml/min/1 73sq m     Narrative:      [place] guidelines for Chronic Kidney Disease (CKD):   •  Stage 1 with normal or high GFR (GFR > 90 mL/min/1 73 square meters)  •  Stage 2 Mild CKD (GFR = 60-89 mL/min/1 73 square meters)  •  Stage 3A Moderate CKD (GFR = 45-59 mL/min/1 73 square meters)  •  Stage 3B Moderate CKD (GFR = 30-44 mL/min/1 73 square meters)  •  Stage 4 Severe CKD (GFR = 15-29 mL/min/1 73 square meters)  •  Stage 5 End Stage CKD (GFR <15 mL/min/1 73 square meters)  Note: GFR calculation is accurate only with a steady state creatinine    Lactic acid [[id number]]  (Normal) Collected: 11/18/22 1232    Lab Status: Final result Specimen: Blood from Arm, Right Updated: 11/18/22 1259     LACTIC ACID 1 5 mmol/L     Narrative:      Result may be elevated if tourniquet was used during collection      Lipase [[id number]]  (Normal) Collected: 11/18/22 1232    Lab Status: Final result Specimen: Blood from Arm, Right Updated: 11/18/22 1256     Lipase 117 u/L     Protime-INR [[id number]]  (Normal) Collected: 11/18/22 1232    Lab Status: Final result Specimen: Blood from Arm, Right Updated: 11/18/22 1253     Protime 12 9 seconds      INR 0 96    APTT [[id number]]  (Normal) Collected: 11/18/22 1232    Lab Status: Final result Specimen: Blood from Arm, Right Updated: 11/18/22 1253     PTT 26 seconds     CBC and differential [[id number]]  (Abnormal) Collected: 11/18/22 1232    Lab Status: Final result Specimen: Blood from Arm, Right Updated: 11/18/22 1241     WBC 15 88 Thousand/uL      RBC 5 54 Million/uL      Hemoglobin 10 5 g/dL      Hematocrit 35 5 %      MCV 64 fL      MCH 19 0 pg MCHC 29 6 g/dL      RDW 16 9 %      MPV 8 5 fL      Platelets 775 Thousands/uL      nRBC 0 /100 WBCs      Neutrophils Relative 79 %      Immat GRANS % 1 %      Lymphocytes Relative 7 %      Monocytes Relative 9 %      Eosinophils Relative 3 %      Basophils Relative 1 %      Neutrophils Absolute 12 64 Thousands/µL      Immature Grans Absolute 0 11 Thousand/uL      Lymphocytes Absolute 1 17 Thousands/µL      Monocytes Absolute 1 36 Thousand/µL      Eosinophils Absolute 0 50 Thousand/µL      Basophils Absolute 0 10 Thousands/µL                  XR chest 1 view portable   Final Result by Ventura Reyes MD (11/18 1542)      No acute cardiopulmonary disease  Workstation performed: [id number]         CT abdomen pelvis with contrast   Final Result by Christina Castorena MD (11/18 1618)      There is no evidence of  bowel obstruction  Contrast is seen extending into the ileostomy bag through the right lower quadrant ostomy stoma   Again noted is fluid-filled J-pouch with some mucosal enhancement, there is no opacification of the J-pouch      Workstation performed: [id number]                    Procedures  Procedures         ED Course                               SBIRT 22yo+    Flowsheet Row Most Recent Value   SBIRT (23 yo +)    In order to provide better care to our patients, we are screening all of our patients for alcohol and drug use  Would it be okay to ask you these screening questions? No Filed at: 11/18/2022 1230                    MDM  Number of Diagnoses or Management Options  Abdominal pain  Diagnosis management comments: CT shows nothing acute  Labs benign  After 3 doses of IV dilaudid pt reports feeling like a pressure was relieved and an increased amt of liquid output from the ostomy  Reports much improved pain  Cleared for dc to fu with [facility] surgeon        Disposition  Final diagnoses:   Abdominal pain     Time reflects when diagnosis was documented in both MDM as applicable and the Disposition within this note     Time User Action Codes Description Comment    11/18/2022  4:32 PM Zonia Putnam Add [R10 9] Abdominal pain       ED Disposition     ED Disposition   Discharge    Condition   Stable    Date/Time   Fri Nov 18, 2022 Deja Camarena discharge to home/self care  Follow-up Information     Follow up With Specialties Details Why Contact Info    Please call your surgeon on Monday to discuss follow up              Discharge Medication List as of 11/18/2022  4:46 PM      CONTINUE these medications which have NOT CHANGED    Details   DULoxetine (CYMBALTA) 60 mg delayed release capsule Take 1 capsule (60 mg total) by mouth daily, Starting Fri 6/10/2022, Normal      HYDROmorphone (DILAUDID) 2 mg tablet Take 3 tablets (6 mg total) by mouth every 6 (six) hours as needed for severe pain (less if possible) Max Daily Amount: 24 mg, Starting Mon 11/7/2022, Normal      levofloxacin (LEVAQUIN) 500 mg tablet Starting Wed 10/26/2022, Historical Med      !! naloxone (NARCAN) 4 mg/0 1 mL nasal spray Administer 1 spray into a nostril  If no response after 2-3 minutes, give another dose in the other nostril using a new spray , Normal      !! naloxone (NARCAN) 4 mg/0 1 mL nasal spray Spray into one nostril if excess sedation  Call 911   Repeat every 2-3 min in alternate nostril until awake or EMS arrives , Historical Med      nystatin (MYCOSTATIN) powder Apply topically, Starting Thu 11/3/2022, Until Sun 11/13/2022 at 2359, Historical Med      ondansetron (ZOFRAN) 4 mg tablet Take 1 tablet (4 mg total) by mouth every 6 (six) hours, Starting Sun 11/13/2022, Normal      pantoprazole (PROTONIX) 40 mg tablet Take 1 tablet (40 mg total) by mouth 2 (two) times a day, Starting Mon 9/26/2022, Until Wed 10/26/2022, Normal      !! predniSONE 10 mg tablet Multiple Dosages:Starting Fri 10/7/2022, Until Thu 10/13/2022 at 2359, THEN Starting Fri 10/14/2022, Until Thu 10/20/2022 at 2359, THEN Starting Fri 10/21/2022, Until Thu 10/27/2022 at 2359, THEN Starting Fri 10/28/2022, Until Thu 11/3/2022 at 2359,  THEN Starting Fri 11/4/2022, Until Thu 11/10/2022 at 2359, THEN Starting Fri 11/11/2022, Until Thu 11/17/2022 at 2359, THEN Starting Fri 11/18/2022, Until Thu 11/24/2022 at 2359, THEN Starting Fri 11/25/2022, Until Thu 12/1/2022 at 2359Take 4 tablet s (40 mg total) by mouth daily for 7 days, THEN 3 5 tablets (35 mg total) daily for 7 days, THEN 3 tablets (30 mg total) daily for 7 days, THEN 2 5 tablets (25 mg total) daily for 7 days, THEN 2 tablets (20 mg total) daily for 7 days, THEN 1 5 tablets (1 5 mg total) daily for 7 days, THEN 1 tablet (10 mg total) daily for 7 days, THEN 0 5 tablets (5 mg total) daily for 7 days  , No Print      !! predniSONE 20 mg tablet Take 2 tablets (40 mg total) by mouth daily for 5 days, Starting Sun 11/13/2022, Until Fri 11/18/2022, Normal      saccharomyces boulardii (FLORASTOR) 250 mg capsule Take 1 capsule (250 mg total) by mouth 2 (two) times a day, Starting Sat 8/27/2022, Normal       !! - Potential duplicate medications found  Please discuss with provider  No discharge procedures on file      PDMP Review       Value Time User    PDMP Reviewed  Yes 11/7/2022  5:49 PM Tate Gil DO          ED Provider  Electronically Signed by           Faith Hawley DO  11/19/22 7106

## 2022-11-20 ENCOUNTER — HOSPITAL ENCOUNTER (OUTPATIENT)
Facility: HOSPITAL | Age: 27
Setting detail: OBSERVATION
Discharge: ANOTHER HOSPITAL | End: 2022-11-23
Attending: EMERGENCY MEDICINE | Admitting: INTERNAL MEDICINE

## 2022-11-20 DIAGNOSIS — R10.9 ABDOMINAL PAIN: Primary | ICD-10-CM

## 2022-11-20 RX ORDER — HYDROMORPHONE HCL/PF 1 MG/ML
1 SYRINGE (ML) INJECTION ONCE
Status: DISCONTINUED | OUTPATIENT
Start: 2022-11-21 | End: 2022-11-21

## 2022-11-21 LAB
ALBUMIN SERPL BCP-MCNC: 3.7 G/DL (ref 3.5–5)
ALP SERPL-CCNC: 118 U/L (ref 46–116)
ALT SERPL W P-5'-P-CCNC: 44 U/L (ref 12–78)
ANION GAP SERPL CALCULATED.3IONS-SCNC: 13 MMOL/L (ref 4–13)
ANION GAP SERPL CALCULATED.3IONS-SCNC: 8 MMOL/L (ref 4–13)
BASOPHILS # BLD AUTO: 0.1 THOUSANDS/ÂΜL (ref 0–0.1)
BASOPHILS # BLD AUTO: 0.13 THOUSANDS/ÂΜL (ref 0–0.1)
BASOPHILS NFR BLD AUTO: 1 % (ref 0–1)
BASOPHILS NFR BLD AUTO: 1 % (ref 0–1)
BILIRUB SERPL-MCNC: 0.42 MG/DL (ref 0.2–1)
BUN SERPL-MCNC: 10 MG/DL (ref 5–25)
BUN SERPL-MCNC: 9 MG/DL (ref 5–25)
CALCIUM SERPL-MCNC: 8.3 MG/DL (ref 8.3–10.1)
CALCIUM SERPL-MCNC: 9.2 MG/DL (ref 8.3–10.1)
CHLORIDE SERPL-SCNC: 103 MMOL/L (ref 96–108)
CHLORIDE SERPL-SCNC: 106 MMOL/L (ref 96–108)
CO2 SERPL-SCNC: 26 MMOL/L (ref 21–32)
CO2 SERPL-SCNC: 26 MMOL/L (ref 21–32)
CREAT SERPL-MCNC: 0.91 MG/DL (ref 0.6–1.3)
CREAT SERPL-MCNC: 1.16 MG/DL (ref 0.6–1.3)
CRP SERPL QL: 12.7 MG/L
EOSINOPHIL # BLD AUTO: 0.43 THOUSAND/ÂΜL (ref 0–0.61)
EOSINOPHIL # BLD AUTO: 0.45 THOUSAND/ÂΜL (ref 0–0.61)
EOSINOPHIL NFR BLD AUTO: 3 % (ref 0–6)
EOSINOPHIL NFR BLD AUTO: 4 % (ref 0–6)
ERYTHROCYTE [DISTWIDTH] IN BLOOD BY AUTOMATED COUNT: 16 % (ref 11.6–15.1)
ERYTHROCYTE [DISTWIDTH] IN BLOOD BY AUTOMATED COUNT: 16.1 % (ref 11.6–15.1)
ERYTHROCYTE [DISTWIDTH] IN BLOOD BY AUTOMATED COUNT: 16.4 % (ref 11.6–15.1)
FERRITIN SERPL-MCNC: 109 NG/ML (ref 8–388)
FOLATE SERPL-MCNC: 6.7 NG/ML (ref 3.1–17.5)
GFR SERPL CREATININE-BSD FRML MDRD: 113 ML/MIN/1.73SQ M
GFR SERPL CREATININE-BSD FRML MDRD: 84 ML/MIN/1.73SQ M
GLUCOSE P FAST SERPL-MCNC: 85 MG/DL (ref 65–99)
GLUCOSE SERPL-MCNC: 85 MG/DL (ref 65–140)
GLUCOSE SERPL-MCNC: 92 MG/DL (ref 65–140)
HCT VFR BLD AUTO: 29.3 % (ref 36.5–49.3)
HCT VFR BLD AUTO: 32.7 % (ref 36.5–49.3)
HCT VFR BLD AUTO: 36.2 % (ref 36.5–49.3)
HGB BLD-MCNC: 10.8 G/DL (ref 12–17)
HGB BLD-MCNC: 8.9 G/DL (ref 12–17)
HGB BLD-MCNC: 9.6 G/DL (ref 12–17)
IMM GRANULOCYTES # BLD AUTO: 0.07 THOUSAND/UL (ref 0–0.2)
IMM GRANULOCYTES # BLD AUTO: 0.08 THOUSAND/UL (ref 0–0.2)
IMM GRANULOCYTES NFR BLD AUTO: 1 % (ref 0–2)
IMM GRANULOCYTES NFR BLD AUTO: 1 % (ref 0–2)
IRON SATN MFR SERPL: 33 % (ref 20–50)
IRON SERPL-MCNC: 83 UG/DL (ref 65–175)
LIPASE SERPL-CCNC: 137 U/L (ref 73–393)
LYMPHOCYTES # BLD AUTO: 1.89 THOUSANDS/ÂΜL (ref 0.6–4.47)
LYMPHOCYTES # BLD AUTO: 2.34 THOUSANDS/ÂΜL (ref 0.6–4.47)
LYMPHOCYTES NFR BLD AUTO: 17 % (ref 14–44)
LYMPHOCYTES NFR BLD AUTO: 18 % (ref 14–44)
MCH RBC QN AUTO: 18.9 PG (ref 26.8–34.3)
MCH RBC QN AUTO: 19.1 PG (ref 26.8–34.3)
MCH RBC QN AUTO: 19.5 PG (ref 26.8–34.3)
MCHC RBC AUTO-ENTMCNC: 29.4 G/DL (ref 31.4–37.4)
MCHC RBC AUTO-ENTMCNC: 29.8 G/DL (ref 31.4–37.4)
MCHC RBC AUTO-ENTMCNC: 30.4 G/DL (ref 31.4–37.4)
MCV RBC AUTO: 64 FL (ref 82–98)
MONOCYTES # BLD AUTO: 1.18 THOUSAND/ÂΜL (ref 0.17–1.22)
MONOCYTES # BLD AUTO: 1.49 THOUSAND/ÂΜL (ref 0.17–1.22)
MONOCYTES NFR BLD AUTO: 11 % (ref 4–12)
MONOCYTES NFR BLD AUTO: 11 % (ref 4–12)
NEUTROPHILS # BLD AUTO: 7.12 THOUSANDS/ÂΜL (ref 1.85–7.62)
NEUTROPHILS # BLD AUTO: 9.42 THOUSANDS/ÂΜL (ref 1.85–7.62)
NEUTS SEG NFR BLD AUTO: 65 % (ref 43–75)
NEUTS SEG NFR BLD AUTO: 67 % (ref 43–75)
NRBC BLD AUTO-RTO: 0 /100 WBCS
NRBC BLD AUTO-RTO: 0 /100 WBCS
PLATELET # BLD AUTO: 329 THOUSANDS/UL (ref 149–390)
PLATELET # BLD AUTO: 346 THOUSANDS/UL (ref 149–390)
PLATELET # BLD AUTO: 438 THOUSANDS/UL (ref 149–390)
PMV BLD AUTO: 8.1 FL (ref 8.9–12.7)
PMV BLD AUTO: 8.3 FL (ref 8.9–12.7)
PMV BLD AUTO: 9.1 FL (ref 8.9–12.7)
POTASSIUM SERPL-SCNC: 3.5 MMOL/L (ref 3.5–5.3)
POTASSIUM SERPL-SCNC: 4 MMOL/L (ref 3.5–5.3)
PROT SERPL-MCNC: 7.6 G/DL (ref 6.4–8.4)
RBC # BLD AUTO: 4.57 MILLION/UL (ref 3.88–5.62)
RBC # BLD AUTO: 5.08 MILLION/UL (ref 3.88–5.62)
RBC # BLD AUTO: 5.66 MILLION/UL (ref 3.88–5.62)
SARS-COV-2 RNA RESP QL NAA+PROBE: NEGATIVE
SODIUM SERPL-SCNC: 140 MMOL/L (ref 135–147)
SODIUM SERPL-SCNC: 142 MMOL/L (ref 135–147)
TIBC SERPL-MCNC: 250 UG/DL (ref 250–450)
VIT B12 SERPL-MCNC: 454 PG/ML (ref 100–900)
WBC # BLD AUTO: 10.81 THOUSAND/UL (ref 4.31–10.16)
WBC # BLD AUTO: 13.89 THOUSAND/UL (ref 4.31–10.16)
WBC # BLD AUTO: 9.12 THOUSAND/UL (ref 4.31–10.16)

## 2022-11-21 RX ORDER — OXYCODONE HYDROCHLORIDE AND ACETAMINOPHEN 5; 325 MG/1; MG/1
1 TABLET ORAL EVERY 4 HOURS PRN
Status: DISCONTINUED | OUTPATIENT
Start: 2022-11-21 | End: 2022-11-21

## 2022-11-21 RX ORDER — HYDROMORPHONE HCL/PF 1 MG/ML
1 SYRINGE (ML) INJECTION ONCE
Status: COMPLETED | OUTPATIENT
Start: 2022-11-21 | End: 2022-11-21

## 2022-11-21 RX ORDER — SODIUM CHLORIDE 9 MG/ML
125 INJECTION, SOLUTION INTRAVENOUS CONTINUOUS
Status: DISCONTINUED | OUTPATIENT
Start: 2022-11-21 | End: 2022-11-21

## 2022-11-21 RX ORDER — HYDROMORPHONE HCL/PF 1 MG/ML
0.5 SYRINGE (ML) INJECTION EVERY 4 HOURS PRN
Status: DISCONTINUED | OUTPATIENT
Start: 2022-11-21 | End: 2022-11-23 | Stop reason: HOSPADM

## 2022-11-21 RX ORDER — ENOXAPARIN SODIUM 100 MG/ML
40 INJECTION SUBCUTANEOUS DAILY
Status: DISCONTINUED | OUTPATIENT
Start: 2022-11-21 | End: 2022-11-23 | Stop reason: HOSPADM

## 2022-11-21 RX ORDER — HYDROMORPHONE HCL/PF 1 MG/ML
0.5 SYRINGE (ML) INJECTION
Status: DISCONTINUED | OUTPATIENT
Start: 2022-11-21 | End: 2022-11-21

## 2022-11-21 RX ORDER — ONDANSETRON 2 MG/ML
4 INJECTION INTRAMUSCULAR; INTRAVENOUS EVERY 6 HOURS PRN
Status: DISCONTINUED | OUTPATIENT
Start: 2022-11-21 | End: 2022-11-23 | Stop reason: HOSPADM

## 2022-11-21 RX ORDER — HYDROMORPHONE HCL 110MG/55ML
1.5 PATIENT CONTROLLED ANALGESIA SYRINGE INTRAVENOUS ONCE
Status: COMPLETED | OUTPATIENT
Start: 2022-11-21 | End: 2022-11-21

## 2022-11-21 RX ORDER — OXYCODONE HYDROCHLORIDE AND ACETAMINOPHEN 5; 325 MG/1; MG/1
1 TABLET ORAL EVERY 4 HOURS PRN
Status: DISCONTINUED | OUTPATIENT
Start: 2022-11-21 | End: 2022-11-23 | Stop reason: HOSPADM

## 2022-11-21 RX ORDER — HYDROMORPHONE HCL IN WATER/PF 6 MG/30 ML
0.2 PATIENT CONTROLLED ANALGESIA SYRINGE INTRAVENOUS
Status: DISCONTINUED | OUTPATIENT
Start: 2022-11-21 | End: 2022-11-23 | Stop reason: HOSPADM

## 2022-11-21 RX ORDER — DULOXETIN HYDROCHLORIDE 60 MG/1
60 CAPSULE, DELAYED RELEASE ORAL DAILY
Status: DISCONTINUED | OUTPATIENT
Start: 2022-11-21 | End: 2022-11-23 | Stop reason: HOSPADM

## 2022-11-21 RX ORDER — SODIUM CHLORIDE 9 MG/ML
75 INJECTION, SOLUTION INTRAVENOUS CONTINUOUS
Status: DISCONTINUED | OUTPATIENT
Start: 2022-11-21 | End: 2022-11-23 | Stop reason: HOSPADM

## 2022-11-21 RX ORDER — OXYCODONE HYDROCHLORIDE AND ACETAMINOPHEN 5; 325 MG/1; MG/1
1 TABLET ORAL ONCE
Status: COMPLETED | OUTPATIENT
Start: 2022-11-21 | End: 2022-11-21

## 2022-11-21 RX ADMIN — ENOXAPARIN SODIUM 40 MG: 40 INJECTION SUBCUTANEOUS at 08:11

## 2022-11-21 RX ADMIN — HYDROMORPHONE HYDROCHLORIDE 0.5 MG: 1 INJECTION, SOLUTION INTRAMUSCULAR; INTRAVENOUS; SUBCUTANEOUS at 15:52

## 2022-11-21 RX ADMIN — SODIUM CHLORIDE 75 ML/HR: 0.9 INJECTION, SOLUTION INTRAVENOUS at 20:39

## 2022-11-21 RX ADMIN — SODIUM CHLORIDE 125 ML/HR: 0.9 INJECTION, SOLUTION INTRAVENOUS at 05:59

## 2022-11-21 RX ADMIN — HYDROMORPHONE HYDROCHLORIDE 0.5 MG: 1 INJECTION, SOLUTION INTRAMUSCULAR; INTRAVENOUS; SUBCUTANEOUS at 05:55

## 2022-11-21 RX ADMIN — SODIUM CHLORIDE 1000 ML: 0.9 INJECTION, SOLUTION INTRAVENOUS at 00:04

## 2022-11-21 RX ADMIN — HYDROMORPHONE HYDROCHLORIDE 0.5 MG: 1 INJECTION, SOLUTION INTRAMUSCULAR; INTRAVENOUS; SUBCUTANEOUS at 19:20

## 2022-11-21 RX ADMIN — HYDROMORPHONE HYDROCHLORIDE 1 MG: 1 INJECTION, SOLUTION INTRAMUSCULAR; INTRAVENOUS; SUBCUTANEOUS at 00:57

## 2022-11-21 RX ADMIN — HYDROMORPHONE HYDROCHLORIDE 0.5 MG: 1 INJECTION, SOLUTION INTRAMUSCULAR; INTRAVENOUS; SUBCUTANEOUS at 12:50

## 2022-11-21 RX ADMIN — OXYCODONE HYDROCHLORIDE AND ACETAMINOPHEN 1 TABLET: 5; 325 TABLET ORAL at 03:50

## 2022-11-21 RX ADMIN — HYDROMORPHONE HYDROCHLORIDE 1.5 MG: 2 INJECTION, SOLUTION INTRAMUSCULAR; INTRAVENOUS; SUBCUTANEOUS at 00:11

## 2022-11-21 RX ADMIN — DULOXETINE HYDROCHLORIDE 60 MG: 60 CAPSULE, DELAYED RELEASE ORAL at 08:11

## 2022-11-21 RX ADMIN — INFLUENZA VIRUS VACCINE 0.5 ML: 15; 15; 15; 15 SUSPENSION INTRAMUSCULAR at 09:29

## 2022-11-21 RX ADMIN — HYDROMORPHONE HYDROCHLORIDE 0.2 MG: 0.2 INJECTION, SOLUTION INTRAMUSCULAR; INTRAVENOUS; SUBCUTANEOUS at 21:23

## 2022-11-21 RX ADMIN — ONDANSETRON 4 MG: 2 INJECTION INTRAMUSCULAR; INTRAVENOUS at 12:50

## 2022-11-21 RX ADMIN — HYDROMORPHONE HYDROCHLORIDE 0.5 MG: 1 INJECTION, SOLUTION INTRAMUSCULAR; INTRAVENOUS; SUBCUTANEOUS at 09:25

## 2022-11-21 NOTE — ED PROVIDER NOTES
History  Chief Complaint   Patient presents with   • Abdominal Pain     Pt reported abdominal pain at surgical site for stoma  Increased out put, nausea and vomiting  No fevers      HPI  Patient is a 57-year-old male with complex past medical history including IBS status post colectomy with J-pouch formation, pouchitis, multiple bouts of obstruction and partial obstruction, recent surgery for diverting ileostomy complicated by prolapse and localized intra-abdominal infection presenting for evaluation of pain around ileostomy site  Patient states that symptoms started a few hours ago while he was sitting at home watching football  Patient states that it is a sharp stabbing pain similar to prior episodes  Patient states increased output from ostomy bag and localized skin irritation which has been making it difficult for him to replace ostomy bag  Patient denies nausea, vomiting, fevers, chills, abdominal distension  Patient evaluated on 11/18 in this emergency department for similar complaint, had CT performed without acute findings  Prior to Admission Medications   Prescriptions Last Dose Informant Patient Reported? Taking? DULoxetine (CYMBALTA) 60 mg delayed release capsule   No No   Sig: Take 1 capsule (60 mg total) by mouth daily   HYDROmorphone (DILAUDID) 2 mg tablet   No No   Sig: Take 3 tablets (6 mg total) by mouth every 6 (six) hours as needed for severe pain (less if possible) Max Daily Amount: 24 mg   naloxone (NARCAN) 4 mg/0 1 mL nasal spray   No No   Sig: Administer 1 spray into a nostril  If no response after 2-3 minutes, give another dose in the other nostril using a new spray  naloxone (NARCAN) 4 mg/0 1 mL nasal spray   Yes No   Sig: Spray into one nostril if excess sedation  Call 911  Repeat every 2-3 min in alternate nostril until awake or EMS arrives     nystatin (MYCOSTATIN) powder   Yes No   Sig: Apply topically   ondansetron (ZOFRAN) 4 mg tablet   No No   Sig: Take 1 tablet (4 mg total) by mouth every 6 (six) hours   pantoprazole (PROTONIX) 40 mg tablet   No No   Sig: Take 1 tablet (40 mg total) by mouth 2 (two) times a day      Facility-Administered Medications: None       Past Medical History:   Diagnosis Date   • Ankylosing spondylitis (HCC)    • Anxiety    • Bowel obstruction (HCC)    • Clostridium difficile colitis 9/13/2018   • Colitis    • 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[facility] [address] ) 9/13/2018   • Pancreatitis    • Ulcerative colitis ([facility] [address] )        Past Surgical History:   Procedure Laterality Date   • COLECTOMY TOTAL      with ileal pouch and anastemosis   • IR PICC PLACEMENT SINGLE LUMEN  3/1/2022   • TOTAL COLECTOMY         History reviewed  No pertinent family history  I have reviewed and agree with the history as documented  E-Cigarette/Vaping   • E-Cigarette Use Never User      E-Cigarette/Vaping Substances   • Nicotine No    • THC No    • CBD No    • Flavoring No    • Other No    • Unknown No      Social History     Tobacco Use   • Smoking status: Never   • Smokeless tobacco: Never   Vaping Use   • Vaping Use: Never used   Substance Use Topics   • Alcohol use: Not Currently     Comment: pt states 5 a month/socially   • Drug use: No       Review of Systems   Constitutional: Negative for chills, fatigue and fever  HENT: Negative for congestion, rhinorrhea and sore throat  Eyes: Negative for photophobia and visual disturbance  Respiratory: Negative for chest tightness and shortness of breath  Cardiovascular: Negative for chest pain, palpitations and leg swelling  Gastrointestinal: Negative for abdominal distention, abdominal pain, diarrhea, nausea and vomiting  Endocrine: Negative for polydipsia and polyuria  Genitourinary: Negative for dysuria and hematuria  Musculoskeletal: Negative for arthralgias and myalgias  Skin: Negative for color change, pallor, rash and wound  Neurological: Negative for weakness, numbness and headaches  Psychiatric/Behavioral: Negative for confusion  Physical Exam  Physical Exam  Vitals and nursing note reviewed  Constitutional:       General: He is not in acute distress  Appearance: He is well-developed and well-nourished  He is not diaphoretic  Comments: Uncomfortable appearing but nontoxic nondistressed   HENT:      Head: Normocephalic and atraumatic  Comments: Moist mucous membranes  Not actively vomiting  Right Ear: External ear normal       Left Ear: External ear normal       Nose: Nose normal       Mouth/Throat:      Mouth: Oropharynx is clear and moist       Pharynx: No oropharyngeal exudate  Eyes:      Conjunctiva/sclera: Conjunctivae normal       Pupils: Pupils are equal, round, and reactive to light  Cardiovascular:      Rate and Rhythm: Normal rate and regular rhythm  Pulses: Intact distal pulses  Heart sounds: Normal heart sounds  No murmur heard  No friction rub  No gallop  Comments: Sinus tachycardia rate of 103  No murmurs rubs or gallops  Extremities warm and well perfused  No mottling  Pulmonary:      Effort: Pulmonary effort is normal  No respiratory distress  Breath sounds: Normal breath sounds  No wheezing  Comments: No increased work of breathing  Speaking complete sentences  Lungs clear to auscultation bilaterally without wheezes, rales, rhonchi  Satting 99% on room air indicating adequate oxygenation  Chest:      Chest wall: No tenderness  Abdominal:      General: Bowel sounds are normal  There is no distension  Palpations: Abdomen is soft  There is no mass  Tenderness: There is no abdominal tenderness  There is no guarding or rebound  Comments: Abdomen nondistended with normal bowel sounds  Pink healthy-appearing mucosa at stoma site  Surrounding area of skin irritation in area of ostomy bag   Musculoskeletal:         General: No deformity or edema  Skin:     General: Skin is warm and dry        Capillary Refill: Capillary refill takes less than 2 seconds  Neurological:      Mental Status: He is alert and oriented to person, place, and time        Comments: AAO x3   Psychiatric:         Mood and Affect: Mood and affect normal          Behavior: Behavior normal          Vital Signs  ED Triage Vitals   Temperature Pulse Respirations Blood Pressure SpO2   11/21/22 0054 11/20/22 2340 11/20/22 2340 11/20/22 2340 11/20/22 2340   98 °F (36 7 °C) 103 20 127/80 99 %      Temp Source Heart Rate Source Patient Position - Orthostatic VS BP Location FiO2 (%)   11/21/22 0054 11/20/22 2340 11/20/22 2340 11/20/22 2340 --   Oral Monitor Sitting Left arm       Pain Score       11/20/22 2340       9           Vitals:    11/20/22 2340 11/21/22 0352   BP: 127/80 123/83   Pulse: 103 90   Patient Position - Orthostatic VS: Sitting Lying         Visual Acuity      ED Medications  Medications   DULoxetine (CYMBALTA) delayed release capsule 60 mg (has no administration in time range)   sodium chloride 0 9 % infusion (125 mL/hr Intravenous New Bag 11/21/22 0559)   enoxaparin (LOVENOX) subcutaneous injection 40 mg (has no administration in time range)   HYDROmorphone (DILAUDID) injection 0 5 mg (0 5 mg Intravenous Given 11/21/22 0555)   oxyCODONE-acetaminophen (PERCOCET) 5-325 mg per tablet 1 tablet (has no administration in time range)   influenza vaccine, quadrivalent (FLUARIX) IM injection 0 5 mL (has no administration in time range)   sodium chloride 0 9 % bolus 1,000 mL (0 mL Intravenous Stopped 11/21/22 0436)   HYDROmorphone (DILAUDID) injection 1 5 mg (1 5 mg Intravenous Given 11/21/22 0011)   HYDROmorphone (DILAUDID) injection 1 mg (1 mg Intravenous Given 11/21/22 0057)   oxyCODONE-acetaminophen (PERCOCET) 5-325 mg per tablet 1 tablet (1 tablet Oral Given 11/21/22 0350)       Diagnostic Studies  Results Reviewed     Procedure Component Value Units Date/Time    COVID only [[id number]]  (Normal) Collected: 11/21/22 0257    Lab Status: Final result Specimen: Nares from Nose Updated: 11/21/22 0337     SARS-CoV-2 Negative    Narrative:      FOR PEDIATRIC PATIENTS - copy/paste COVID Guidelines URL to browser: [email or web address]  [employer]    SARS-CoV-2 assay is a Nucleic Acid Amplification assay intended for the  qualitative detection of nucleic acid from SARS-CoV-2 in nasopharyngeal  swabs  Results are for the presumptive identification of SARS-CoV-2 RNA  Positive results are indicative of infection with SARS-CoV-2, the virus  causing COVID-19, but do not rule out bacterial infection or co-infection  with other viruses  Laboratories within the [place] and its  territories are required to report all positive results to the appropriate  public health authorities  Negative results do not preclude SARS-CoV-2  infection and should not be used as the sole basis for treatment or other  patient management decisions  Negative results must be combined with  clinical observations, patient history, and epidemiological information  This test has not been FDA cleared or approved  This test has been authorized by FDA under an Emergency Use Authorization  (EUA)  This test is only authorized for the duration of time the  declaration that circumstances exist justifying the authorization of the  emergency use of an in vitro diagnostic tests for detection of SARS-CoV-2  virus and/or diagnosis of COVID-19 infection under section 564(b)(1) of  the Act, 21 U  S C  161RWO-1(J)(7), unless the authorization is terminated  or revoked sooner  The test has been validated but independent review by FDA  and CLIA is pending  Test performed using [employer] Gene[employer]pert: This RT-PCR assay targets N2,  a region unique to SARS-CoV-2  A conserved region in the E-gene was chosen  for pan-Sarbecovirus detection which includes SARS-CoV-2  According to CMS-2020-01-R, this platform meets the definition of high-throughput technology      Comprehensive metabolic panel [[id number]]  (Abnormal) Collected: 11/21/22 0004    Lab Status: Final result Specimen: Blood from Arm, Right Updated: 11/21/22 0047     Sodium 142 mmol/L      Potassium 4 0 mmol/L      Chloride 103 mmol/L      CO2 26 mmol/L      ANION GAP 13 mmol/L      BUN 10 mg/dL      Creatinine 1 16 mg/dL      Glucose 92 mg/dL      Calcium 9 2 mg/dL      AST --     ALT 44 U/L      Alkaline Phosphatase 118 U/L      Total Protein 7 6 g/dL      Albumin 3 7 g/dL      Total Bilirubin 0 42 mg/dL      eGFR 84 ml/min/1 73sq m     Narrative:      National Kidney Disease Foundation guidelines for Chronic Kidney Disease (CKD):   •  Stage 1 with normal or high GFR (GFR > 90 mL/min/1 73 square meters)  •  Stage 2 Mild CKD (GFR = 60-89 mL/min/1 73 square meters)  •  Stage 3A Moderate CKD (GFR = 45-59 mL/min/1 73 square meters)  •  Stage 3B Moderate CKD (GFR = 30-44 mL/min/1 73 square meters)  •  Stage 4 Severe CKD (GFR = 15-29 mL/min/1 73 square meters)  •  Stage 5 End Stage CKD (GFR <15 mL/min/1 73 square meters)  Note: GFR calculation is accurate only with a steady state creatinine    Lipase [[id number]]  (Normal) Collected: 11/21/22 0004    Lab Status: Final result Specimen: Blood from Arm, Right Updated: 11/21/22 0047     Lipase 137 u/L     CBC and differential [[id number]]  (Abnormal) Collected: 11/21/22 0004    Lab Status: Final result Specimen: Blood from Arm, Right Updated: 11/21/22 0028     WBC 13 89 Thousand/uL      RBC 5 66 Million/uL      Hemoglobin 10 8 g/dL      Hematocrit 36 2 %      MCV 64 fL      MCH 19 1 pg      MCHC 29 8 g/dL      RDW 16 4 %      MPV 9 1 fL      Platelets 385 Thousands/uL      nRBC 0 /100 WBCs      Neutrophils Relative 67 %      Immat GRANS % 1 %      Lymphocytes Relative 17 %      Monocytes Relative 11 %      Eosinophils Relative 3 %      Basophils Relative 1 %      Neutrophils Absolute 9 42 Thousands/µL      Immature Grans Absolute 0 08 Thousand/uL      Lymphocytes Absolute 2 34 Thousands/µL      Monocytes Absolute 1 49 Thousand/µL      Eosinophils Absolute 0 43 Thousand/µL      Basophils Absolute 0 13 Thousands/µL                  No orders to display              Procedures  Procedures         ED Course                               SBIRT 20yo+    Flowsheet Row Most Recent Value   SBIRT (23 yo +)    In order to provide better care to our patients, we are screening all of our patients for alcohol and drug use  Would it be okay to ask you these screening questions? Yes Filed at: 11/21/2022 0001   Initial Alcohol Screen: US AUDIT-C     1  How often do you have a drink containing alcohol? 0 Filed at: 11/21/2022 0001   2  How many drinks containing alcohol do you have on a typical day you are drinking? 0 Filed at: 11/21/2022 0001   3a  Male UNDER 65: How often do you have five or more drinks on one occasion? 0 Filed at: 11/21/2022 0001   3b  FEMALE Any Age, or MALE 65+: How often do you have 4 or more drinks on one occassion? 0 Filed at: 11/21/2022 0001   Audit-C Score 0 Filed at: 11/21/2022 0001   ROD: How many times in the past year have you    Used an illegal drug or used a prescription medication for non-medical reasons? Never Filed at: 11/21/2022 0001                    MDM  Number of Diagnoses or Management Options  Abdominal pain  Diagnosis management comments: Repeat evaluation for chronic abdominal pain, numerous prior evaluations for same  Patient with grossly benign abdominal examination, no evidence of bowel obstruction  Recent CT without acute findings  Plan for lab evaluation including CBC, CMP, lipase, IV fluids, pain control with Dilaudid, reassessment  Patient with continued pain requiring multiple doses of IV Dilaudid  Improving leukocytosis on CBC  Patient remaining uncomfortable emergency department and ultimately admitted to medicine service for intractable abdominal pain        Disposition  Final diagnoses:   Abdominal pain     Time reflects when diagnosis was documented in both MDM as applicable and the Disposition within this note     Time User Action Codes Description Comment    11/21/2022  2:06 AM Michelle Chery Add [R10 9] Abdominal pain       ED Disposition     ED Disposition   Admit    Condition   Stable    Date/Time   Mon Nov 21, 2022  2:45 AM    Comment   Case was discussed with CALE and the patient's admission status was agreed to be Admission Status: observation status to the service of Dr Zonia Bonilla   Follow-up Information     Follow up With Specialties Details Why Contact Info Additional Information    [address] [facility] Emergency Department Emergency Medicine  If symptoms worsen [address] [facility] [address]  [address] [address] Emergency Department, [place], [place], [address]          Current Discharge Medication List      CONTINUE these medications which have NOT CHANGED    Details   DULoxetine (CYMBALTA) 60 mg delayed release capsule Take 1 capsule (60 mg total) by mouth daily  Qty: 90 capsule, Refills: 3    Associated Diagnoses: CAR (generalized anxiety disorder)      HYDROmorphone (DILAUDID) 2 mg tablet Take 3 tablets (6 mg total) by mouth every 6 (six) hours as needed for severe pain (less if possible) Max Daily Amount: 24 mg  Qty: 60 tablet, Refills: 0    Associated Diagnoses: Abdominal pain, unspecified abdominal location      !! naloxone (NARCAN) 4 mg/0 1 mL nasal spray Administer 1 spray into a nostril  If no response after 2-3 minutes, give another dose in the other nostril using a new spray  Qty: 1 each, Refills: 1    Associated Diagnoses: Opioid use      !! naloxone (NARCAN) 4 mg/0 1 mL nasal spray Spray into one nostril if excess sedation  Call 911  Repeat every 2-3 min in alternate nostril until awake or EMS arrives        nystatin (MYCOSTATIN) powder Apply topically      ondansetron (ZOFRAN) 4 mg tablet Take 1 tablet (4 mg total) by mouth every 6 (six) hours  Qty: 12 tablet, Refills: 0 Associated Diagnoses: Nausea      pantoprazole (PROTONIX) 40 mg tablet Take 1 tablet (40 mg total) by mouth 2 (two) times a day  Qty: 60 tablet, Refills: 0    Associated Diagnoses: Intractable abdominal pain; 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       ! ! - Potential duplicate medications found  Please discuss with provider  No discharge procedures on file      PDMP Review       Value Time User    PDMP Reviewed  Yes 11/7/2022  5:49 PM Nadia Oglesby DO          ED Provider  Electronically Signed by           Argentina Verma MD  11/21/22 6220

## 2022-11-21 NOTE — ASSESSMENT & PLAN NOTE
Patient presents abdominal pain, still uncontrolled intervals doses of pain medicine - complex PMH including IBS status post colectomy with J-pouch formation, proctitis, recent surgery 10/18 for diverting ileostomy complicated by prolapse and localized intra-abdominal infection  Plan for reconstruction of pouch in April at [facility]   · CT a/p 11/18 : There is no evidence of  bowel obstruction  Contrast is seen extending into the ileostomy bag through the right lower quadrant ostomy stoma  · Again noted is fluid-filled J-pouch with some mucosal enhancement, there is no opacification of the J-pouch  · No longer on Flagyl, prednisone, per patient his surgeon stated no indication for biologic therapy  · Continue pain medication p r n    · Clear liquid diet  · IV fluids  · Serial abdominal exams, consider repeating CT if worsening pain  · GI consult

## 2022-11-21 NOTE — CONSULTS
Consultation -[facility]   Regis Izquierdo 32 y o  male MRN: [id number]    Unit/Bed#: 2669 [facility] Encounter: [id number]      Physician Requesting Consult: Dr Farrah Jordan    Reason for Consult / Principal Problem:   Abdominal pain    HPI:   This is a 32 y o  male with a PMH of ankylosing spondylitis, IBS s/p colectomy with J-pouch formation, pouchitis, recent surgery for  ileostomy who presents with abdominal pain  Patient underwent multiple recent surgeries  Patient also with cardiac arrest event afterwards and was in ICU for a few days  Pt is s/p lap ileostomy creation 10/13/22, course c/b stoma proximal limb evisceration on 10/18, s/p revision, postop pain, tachycardia, fever, s/p ex lap, washout, end ileostomy creation, purulent ascites  He states they plan to do a reconstruction of his pouch in April, would like to wait until then to prevent further adhesions  Patient was seen in the ED 2 days ago with abdominal pain near stoma  Returns again tonight with the same pain  He states the pain has been waxing and waning for the past few days but became severe again last night while watching football  He reports decreased appetite, no nausea or vomiting, normal bowel movements  Ct scan on 11/18 showing passage of Contrast from stomach into the small bowel loops and into the ileostomy bag is visualized  Again noted is a fluid-filled and distended J-pouch  Postsurgical changes from proctocolectomy  No contrast is seen within the J-pouch  Mild mucosal enhancement in the J-pouch as seen on the previous study  Patient actually reports that he did not have a cardiac arrest but became significantly tachycardic and then went back to the OR  He reports that the doctor strongly feels that he does not have Crohn's  He states that he has been having high output from his ostomy  This has happened for the past 2 days  Reports he completed an antibiotic course about a week ago    Denies any sick contacts  States the pain is primarily around his ostomy in the upper part  Did have some red output from the ostomy but was eating red Jell-O  Allergies: Allergies   Allergen Reactions   • Cefazolin Hives     Other reaction(s): Arrythmia (Abnormal Heartbeat), Rash Maculopapular   • Mesalamine GI Intolerance     Other reaction(s): Pancreatitis  [facility] - [id number]: pancreatitis   • Wound Dressings Rash     [employer] ostomy Products        Medications:  Current Facility-Administered Medications:   •  DULoxetine (CYMBALTA) delayed release capsule 60 mg, 60 mg, Oral, Daily, Emmanuelle Vecellio, PA-C  •  enoxaparin (LOVENOX) subcutaneous injection 40 mg, 40 mg, Subcutaneous, Daily, Emmanuelle Vecellio, PA-C  •  HYDROmorphone (DILAUDID) injection 0 5 mg, 0 5 mg, Intravenous, Q3H PRN, Emmanuelle Vecellio, PA-C, 0 5 mg at 11/21/22 0555  •  influenza vaccine, quadrivalent (FLUARIX) IM injection 0 5 mL, 0 5 mL, Intramuscular, Once, Darlin Martinez,   •  oxyCODONE-acetaminophen (PERCOCET) 5-325 mg per tablet 1 tablet, 1 tablet, Oral, Q4H PRN, Emmanuelle Vecellio, PA-C  •  sodium chloride 0 9 % infusion, 125 mL/hr, Intravenous, Continuous, Emmanuelle Vecellio, PA-C, Last Rate: 125 mL/hr at 11/21/22 0559, 125 mL/hr at 11/21/22 0559    Past Medical history:  Past Medical History:   Diagnosis Date   • Ankylosing spondylitis ([facility] [address] )    • Anxiety    • Bowel obstruction (HCC)    • Clostridium difficile colitis 9/13/2018   • Colitis    • 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[address] ) 9/13/2018   • Pancreatitis    • Ulcerative colitis ([facility] [address] )        Past Surgical History:   Past Surgical History:   Procedure Laterality Date   • COLECTOMY TOTAL      with ileal pouch and anastemosis   • IR PICC PLACEMENT SINGLE LUMEN  3/1/2022   • TOTAL COLECTOMY         Family History: History reviewed  No pertinent family history      Social history:   Social History     Socioeconomic History   • Marital status: Single     Spouse name: Not on file   • Number of children: Not on file   • Years of education: Not on file   • Highest education level: Not on file   Occupational History   • Not on file   Tobacco Use   • Smoking status: Never   • Smokeless tobacco: Never   Vaping Use   • Vaping Use: Never used   Substance and Sexual Activity   • Alcohol use: Not Currently     Comment: pt states 5 a month/socially   • Drug use: No   • Sexual activity: Not on file   Other Topics Concern   • Not on file   Social History Narrative   • Not on file     Social Determinants of Health     Financial Resource Strain: Not on file   Food Insecurity: No Food Insecurity   • Worried About Running Out of Food in the Last Year: Never true   • Ran Out of Food in the Last Year: Never true   Transportation Needs: No Transportation Needs   • Lack of Transportation (Medical): No   • Lack of Transportation (Non-Medical):  No   Physical Activity: Not on file   Stress: Not on file   Social Connections: Not on file   Intimate Partner Violence: Not on file   Housing Stability: Low Risk    • Unable to Pay for Housing in the Last Year: No   • Number of Places Lived in the Last Year: 1   • Unstable Housing in the Last Year: No       Review of Systems: All other systems were reviewed and were negative, otherwise please refer to HPI    Physical Exam: /80   Pulse 81   Temp (!) 97 4 °F (36 3 °C)   Resp 22   Ht 5' 9" (1 753 m)   Wt 74 8 kg (165 lb)   SpO2 98%   BMI 24 37 kg/m²     General Appearance:    Alert, cooperative, no distress, appears stated age   Head:    Normocephalic, without obvious abnormality, atraumatic   Eyes:    No scleral icterus           Mouth:  Mucosa moist   Neck:   Supple, symmetrical, trachea midline, no thyromegaly       Lungs:     Clear to auscultation bilaterally, respirations unlabored       Heart:    Regular rate and rhythm, S1 and S2 normal, no murmur, rub   or gallop     Abdomen:     Soft, non-tender, bowel sounds active all four quadrants,     no masses, no organomegaly   Genitalia: deferred   Rectal:   deferred   Extremities:   Extremities normal,no cyanosis or edema       Skin:   Skin color, texture, turgor normal, no rashes or lesions       Neurologic:   Grossly intact, no focal deficit           Lab Results:   Recent Results (from the past 24 hour(s))   CBC and differential    Collection Time: 11/21/22 12:04 AM   Result Value Ref Range    WBC 13 89 (H) 4 31 - 10 16 Thousand/uL    RBC 5 66 (H) 3 88 - 5 62 Million/uL    Hemoglobin 10 8 (L) 12 0 - 17 0 g/dL    Hematocrit 36 2 (L) 36 5 - 49 3 %    MCV 64 (L) 82 - 98 fL    MCH 19 1 (L) 26 8 - 34 3 pg    MCHC 29 8 (L) 31 4 - 37 4 g/dL    RDW 16 4 (H) 11 6 - 15 1 %    MPV 9 1 8 9 - 12 7 fL    Platelets 047 (H) 025 - 390 Thousands/uL    nRBC 0 /100 WBCs    Neutrophils Relative 67 43 - 75 %    Immat GRANS % 1 0 - 2 %    Lymphocytes Relative 17 14 - 44 %    Monocytes Relative 11 4 - 12 %    Eosinophils Relative 3 0 - 6 %    Basophils Relative 1 0 - 1 %    Neutrophils Absolute 9 42 (H) 1 85 - 7 62 Thousands/µL    Immature Grans Absolute 0 08 0 00 - 0 20 Thousand/uL    Lymphocytes Absolute 2 34 0 60 - 4 47 Thousands/µL    Monocytes Absolute 1 49 (H) 0 17 - 1 22 Thousand/µL    Eosinophils Absolute 0 43 0 00 - 0 61 Thousand/µL    Basophils Absolute 0 13 (H) 0 00 - 0 10 Thousands/µL   Comprehensive metabolic panel    Collection Time: 11/21/22 12:04 AM   Result Value Ref Range    Sodium 142 135 - 147 mmol/L    Potassium 4 0 3 5 - 5 3 mmol/L    Chloride 103 96 - 108 mmol/L    CO2 26 21 - 32 mmol/L    ANION GAP 13 4 - 13 mmol/L    BUN 10 5 - 25 mg/dL    Creatinine 1 16 0 60 - 1 30 mg/dL    Glucose 92 65 - 140 mg/dL    Calcium 9 2 8 3 - 10 1 mg/dL    AST      ALT 44 12 - 78 U/L    Alkaline Phosphatase 118 (H) 46 - 116 U/L    Total Protein 7 6 6 4 - 8 4 g/dL    Albumin 3 7 3 5 - 5 0 g/dL    Total Bilirubin 0 42 0 20 - 1 00 mg/dL    eGFR 84 ml/min/1 73sq m   Lipase    Collection Time: 11/21/22 12:04 AM   Result Value Ref Range    Lipase 137 73 - 393 u/L   COVID only    Collection Time: 11/21/22  2:57 AM    Specimen: Nose; Nares   Result Value Ref Range    SARS-CoV-2 Negative Negative   CBC and differential    Collection Time: 11/21/22  7:07 AM   Result Value Ref Range    WBC 10 81 (H) 4 31 - 10 16 Thousand/uL    RBC 4 57 3 88 - 5 62 Million/uL    Hemoglobin 8 9 (L) 12 0 - 17 0 g/dL    Hematocrit 29 3 (L) 36 5 - 49 3 %    MCV 64 (L) 82 - 98 fL    MCH 19 5 (L) 26 8 - 34 3 pg    MCHC 30 4 (L) 31 4 - 37 4 g/dL    RDW 16 1 (H) 11 6 - 15 1 %    MPV 8 1 (L) 8 9 - 12 7 fL    Platelets 572 036 - 478 Thousands/uL    nRBC 0 /100 WBCs    Neutrophils Relative 65 43 - 75 %    Immat GRANS % 1 0 - 2 %    Lymphocytes Relative 18 14 - 44 %    Monocytes Relative 11 4 - 12 %    Eosinophils Relative 4 0 - 6 %    Basophils Relative 1 0 - 1 %    Neutrophils Absolute 7 12 1 85 - 7 62 Thousands/µL    Immature Grans Absolute 0 07 0 00 - 0 20 Thousand/uL    Lymphocytes Absolute 1 89 0 60 - 4 47 Thousands/µL    Monocytes Absolute 1 18 0 17 - 1 22 Thousand/µL    Eosinophils Absolute 0 45 0 00 - 0 61 Thousand/µL    Basophils Absolute 0 10 0 00 - 0 10 Thousands/µL       Imaging Studies: XR chest 1 view portable    Result Date: 11/18/2022  Narrative: CHEST INDICATION:   Shortness of breath  COMPARISON:  3 June 20, 2010 EXAM PERFORMED/VIEWS:  XR CHEST PORTABLE FINDINGS: Cardiomediastinal silhouette appears unremarkable  The lungs are clear  No pneumothorax or pleural effusion  Osseous structures appear within normal limits for patient age  Impression: No acute cardiopulmonary disease  Workstation performed: [id number]     XR abdomen obstruction series    Result Date: 11/18/2022  Narrative: OBSTRUCTION SERIES INDICATION:   abdominal pain  COMPARISON:  11/11/2022 EXAM PERFORMED/VIEWS:  XR ABDOMEN OBSTRUCTION SERIES Images: 4 FINDINGS: Persistent dilated loop of bowel in the left upper quadrant consistent with patient's known J-pouch   Right lower quadrant ostomy again evident No free air beneath the hemidiaphragms  No pathologic calcifications or soft tissue masses evident  Osseous structures are unremarkable  Examination of the chest reveals a normal cardiomediastinal silhouette  Lungs are clear  Impression: Persistent mildly dilated left upper quadrant J-pouch Workstation performed: [id number]     XR abdomen obstruction series    Result Date: 11/12/2022  Narrative: OBSTRUCTION SERIES INDICATION:   Abdominal pain  COMPARISON:  None EXAM PERFORMED/VIEWS:  XR ABDOMEN OBSTRUCTION SERIES FINDINGS: There is a nonobstructive bowel gas pattern  Right lower quadrant ostomy, No free air beneath the hemidiaphragms  No pathologic calcifications or soft tissue masses evident  Osseous structures are unremarkable  Examination of the chest reveals a normal cardiomediastinal silhouette  Lungs are clear  Impression: Nonobstructive bowel gas pattern  Workstation performed: [id number]     CT abdomen pelvis with contrast    Result Date: 11/18/2022  Narrative: CT ABDOMEN AND PELVIS WITH IV CONTRAST INDICATION:   RLQ abdominal pain (Age >= 14y) mult recent surgeries iliostomy no output increased pain vomiting  COMPARISON:  November 6, 2022 TECHNIQUE:  CT examination of the abdomen and pelvis was performed  Axial, sagittal, and coronal 2D reformatted images were created from the source data and submitted for interpretation  Radiation dose length product (DLP) for this visit:  487 89 mGy-cm   This examination, like all CT scans performed in the [facility], was performed utilizing techniques to minimize radiation dose exposure, including the use of iterative  reconstruction and automated exposure control  IV Contrast:  100 mL of iohexol (OMNIPAQUE) 50 mL of iohexol (OMNIPAQUE) Enteric Contrast:  Enteric contrast was not administered  FINDINGS: ABDOMEN LOWER CHEST:  No acute consolidation seen LIVER/BILIARY TREE:  No focal liver lesion seen GALLBLADDER:  No calcified gallstones   No pericholecystic inflammatory change  SPLEEN:  Unremarkable  PANCREAS:  Unremarkable  ADRENAL GLANDS:  Unremarkable  KIDNEYS/URETERS:  Unremarkable  No hydronephrosis  STOMACH AND BOWEL:  There are right lower quadrant ileostomy stoma seen  There is no evidence of bowel obstruction Passage of Contrast from stomach into the small bowel loops and into the ileostomy bag is visualized Again noted is a fluid-filled and distended J-pouch  Postsurgical changes from proctocolectomy No contrast is seen within the J-pouch Mild mucosal enhancement in the J-pouch as seen on the previous study APPENDIX:  No findings to suggest appendicitis  ABDOMINOPELVIC CAVITY:  No ascites  No pneumoperitoneum  No lymphadenopathy  VESSELS:  Unremarkable for patient's age  PELVIS REPRODUCTIVE ORGANS:  Unremarkable for patient's age  URINARY BLADDER:  Unremarkable  ABDOMINAL WALL/INGUINAL REGIONS:  Unremarkable  OSSEOUS STRUCTURES:  No acute fracture or destructive osseous lesion  Impression: There is no evidence of  bowel obstruction  Contrast is seen extending into the ileostomy bag through the right lower quadrant ostomy stoma Again noted is fluid-filled J-pouch with some mucosal enhancement, there is no opacification of the J-pouch Workstation performed: [id number]     CT abdomen pelvis with contrast    Result Date: 11/6/2022  Narrative: CT ABDOMEN AND PELVIS WITH IV CONTRAST INDICATION:   Peritonitis or perforation suspected r/o peritonitis  "States had surgery at [facility] October 13 th and October 18th at [facility]  States his surgeon from [facility] is sending him for a CT Scan, hx of peritonitis  States started with pain this morning that is getting progressively worse" COMPARISON:  CT abdomen pelvis 10/28/2022 TECHNIQUE:  CT examination of the abdomen and pelvis was performed  Axial, sagittal, and coronal 2D reformatted images were created from the source data and submitted for interpretation   Radiation dose length product (DLP) for this visit:  532 47 mGy-cm   This examination, like all CT scans performed in the [facility], was performed utilizing techniques to minimize radiation dose exposure, including the use of iterative  reconstruction and automated exposure control  IV Contrast:  100 mL of iohexol (OMNIPAQUE) Enteric Contrast:  Enteric contrast was not administered  FINDINGS: ABDOMEN LOWER CHEST:  No clinically significant abnormality identified in the visualized lower chest  LIVER/BILIARY TREE:  Unremarkable  GALLBLADDER:  No calcified gallstones  No pericholecystic inflammatory change  SPLEEN:  Unremarkable  PANCREAS:  Unremarkable  ADRENAL GLANDS:  Unremarkable  KIDNEYS/URETERS:  Unremarkable  No hydronephrosis  STOMACH AND BOWEL:  Status post proctocolectomy with J-pouch and right lower quadrant ileostomy  Distended J-pouch similar in appearance to 10/28/2022  APPENDIX:  Surgically absent  ABDOMINOPELVIC CAVITY:  No ascites  No pneumoperitoneum  Unchanged 13 mm right external iliac node # 2/76  VESSELS:  Unremarkable for patient's age  PELVIS REPRODUCTIVE ORGANS:  Unremarkable for patient's age  URINARY BLADDER:  Unremarkable  ABDOMINAL WALL/INGUINAL REGIONS:  Unremarkable  OSSEOUS STRUCTURES:  No acute fracture or destructive osseous lesion  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     Impression: No acute findings  Status post proctocolectomy with J-pouch and right lower quadrant ileostomy  Distended J-pouch similar in appearance to 10/28/2022    Workstation performed: [id number]     CT abdomen pelvis with contrast    Result Date: 10/28/2022  Narrative: CT ABDOMEN AND PELVIS WITH IV CONTRAST INDICATION:   post op abdominal pain  COMPARISON:  CT abdomen pelvis with contrast October 9, 2022  TECHNIQUE:  CT examination of the abdomen and pelvis was performed  Axial, sagittal, and coronal 2D reformatted images were created from the source data and submitted for interpretation   Radiation dose length product (DLP) for this visit:  479 35 mGy-cm   This examination, like all CT scans performed in the [facility], was performed utilizing techniques to minimize radiation dose exposure, including the use of iterative  reconstruction and automated exposure control  IV Contrast:  100 mL of iohexol (OMNIPAQUE) Enteric Contrast:  Enteric contrast was not administered  FINDINGS: ABDOMEN LOWER CHEST:  No clinically significant abnormality identified in the visualized lower chest  LIVER/BILIARY TREE:  Unremarkable  GALLBLADDER:  No calcified gallstones  No pericholecystic inflammatory change  SPLEEN:  Unremarkable  PANCREAS:  Unremarkable  ADRENAL GLANDS:  Unremarkable  KIDNEYS/URETERS:  Unremarkable  No hydronephrosis  STOMACH AND BOWEL: New postsurgical changes of right hemiabdomen end ileostomy with prior surgical changes of proctocolectomy with J-pouch  Mild inflammatory changes in right parastomal region, likely postsurgical change  Similar dilatation of J-pouch  No evidence of small bowel obstruction  APPENDIX:  No findings to suggest appendicitis  ABDOMINOPELVIC CAVITY:  No ascites  No pneumoperitoneum  Unchanged 1 4 cm right mell-pouch lymph node (2:68)  VESSELS:  Unremarkable for patient's age  PELVIS REPRODUCTIVE ORGANS:  Unremarkable for patient's age  URINARY BLADDER:  Unremarkable  ABDOMINAL WALL/INGUINAL REGIONS:  3 0 x 1 3 cm fluid collection in midline abdominal incision (2:54)  Surgical skin staples overlie the midline abdominal incision  OSSEOUS STRUCTURES:  No acute fracture or destructive osseous lesion  Sclerosis of bilateral iliac bone bones adjacent to the sacroiliac joints, likely sacroiliitis of underlying inflammatory bowel disease  Impression: New postsurgical changes of right hemiabdomen end ileostomy with prior surgical changes of proctocolectomy with J-pouch  Mild inflammatory changes in right parastomal region, likely postsurgical change   3 0 cm fluid collection midline abdominal incision  Differential includes abscess, hematoma, or seroma given recent surgery  Unchanged 1 4 cm right mell-pouch lymph node, likely reactive  Additional chronic/incidental findings as detailed above  The study was marked in [facility] for immediate notification  Workstation performed: [id number]       Assessment/Plan:   Abdominal pain  Patient presents abdominal pain, still uncontrolled intervals doses of pain medicine - complex PMH including IBD status post colectomy with J-pouch formation, proctitis, recent surgery 10/18 for diverting ileostomy complicated by prolapse and localized intra-abdominal infection  Plan for reconstruction of pouch in April at [facility]   • CT a/p 11/18 : There is no evidence of  bowel obstruction  Contrast is seen extending into the ileostomy bag through the right lower quadrant ostomy stoma  • Again noted is fluid-filled J-pouch with some mucosal enhancement, there is no opacification of the J-pouch  • No longer on Flagyl, prednisone, and per patient his surgeon stated no indication for biologic therapy  • Would recommend to touch base with [facility] surgeon since he has had recurrent postoperative pain  • CRP is 12 7 which is around where it was in September but was markedly elevated in August at 180 7  • Surgical pathology was reviewed from small-bowel resection which showed - Small intestine with active chronic enteritis, transmural defect(measuring 5cm) and with marked active serositis  Negative for dysplasia, negative for granuloma  Margin closer to perforation is viable and shows active chronic enteritis  Other margin is viable and shows acute serositis  Immunohistochemical stain for CMV is positive in rare cells  • Will order stool studies to rule out infectious etiology due to recent increased output including C diff, will order fecal calprotectin as well,    Patient is contacting surgeon at [facility] and awaiting a call back  Thank you for the consultation   Case will be discussed with Dr Sarah Estes

## 2022-11-21 NOTE — DISCHARGE INSTRUCTIONS
Your labs were improved from a few days ago  Continue local skin care at side of ostomy  If you have any return of severe abdominal pain, persistent nausea and vomiting, fevers, chills, return to the emergency department

## 2022-11-21 NOTE — PLAN OF CARE
Problem: PAIN - ADULT  Goal: Verbalizes/displays adequate comfort level or baseline comfort level  Description: Interventions:  - Encourage patient to monitor pain and request assistance  - Assess pain using appropriate pain scale  - Administer analgesics based on type and severity of pain and evaluate response  - Implement non-pharmacological measures as appropriate and evaluate response  - Consider cultural and social influences on pain and pain management  - Notify physician/advanced practitioner if interventions unsuccessful or patient reports new pain  Outcome: Progressing     Problem: GASTROINTESTINAL - ADULT  Goal: Maintains or returns to baseline bowel function  Description: INTERVENTIONS:  - Assess bowel function  - Encourage oral fluids to ensure adequate hydration  - Administer IV fluids if ordered to ensure adequate hydration  - Administer ordered medications as needed  - Encourage mobilization and activity  - Consider nutritional services referral to assist patient with adequate nutrition and appropriate food choices  Outcome: Progressing

## 2022-11-21 NOTE — H&P
Stephanie 47 1995, 32 y o  male MRN: [id number]  Unit/Bed#: 2 [address] Encounter: [id number]  Primary Care Provider: Saul Aguiar DO   Date and time admitted to hospital: 11/20/2022 11:39 PM    * Abdominal pain  Assessment & Plan  Patient presents abdominal pain, still uncontrolled intervals doses of pain medicine - complex PMH including IBS status post colectomy with J-pouch formation, proctitis, recent surgery 10/18 for diverting ileostomy complicated by prolapse and localized intra-abdominal infection  Plan for reconstruction of pouch in April at [facility]   · CT a/p 11/18 : There is no evidence of  bowel obstruction  Contrast is seen extending into the ileostomy bag through the right lower quadrant ostomy stoma  · Again noted is fluid-filled J-pouch with some mucosal enhancement, there is no opacification of the J-pouch  · No longer on Flagyl, prednisone, per patient his surgeon stated no indication for biologic therapy  · Continue pain medication p r n  · Clear liquid diet  · IV fluids  · Serial abdominal exams, consider repeating CT if worsening pain  · GI consult      Ankylosing spondylitis (HCC)  Assessment & Plan  Not on biologic therapy    ACR (generalized anxiety disorder)  Assessment & Plan  Continue Cymbalta 60 mg daily    VTE Pharmacologic Prophylaxis:   Moderate Risk (Score 3-4) - Pharmacological DVT Prophylaxis Ordered: enoxaparin (Lovenox)  Code Status: Level 1 - Full Code   Discussion with family: Patient declined call to   Anticipated Length of Stay: Patient will be admitted on an observation basis with an anticipated length of stay of less than 2 midnights secondary to Abdominal pain  Total Time for Visit, including Counseling / Coordination of Care: 45 minutes Greater than 50% of this total time spent on direct patient counseling and coordination of care      Chief Complaint:  Abdominal pain    History of Present Illness:  Karis Echols is a 32 y o  male with a PMH of ankylosing spondylitis, IBS s/p colectomy with J-pouch formation, pouchitis, recent surgery for diverting ileostomy who presents with abdominal pain  Patient underwent recent surgery on 12/96, complicated with prolapse and peritonitis requiring exploratory laparotomy  Patient with cardiac arrest event afterwards and was in ICU for a few days  He states they plan to do a reconstruction of his pouch in April, would like to wait until then to prevent further adhesions  Patient was seen in the ED 2 days ago with abdominal pain near stoma  Returns again tonight with the same pain  He states the pain has been waxing and waning for the past few days but became severe again last night while watching football  He reports decreased appetite, no nausea or vomiting, normal bowel movements  He is concerned about going home at this pain is still uncontrolled  Will monitor overnight  Denies any fevers, chills, chest pain, shortness breast cough  Review of Systems:  Review of Systems   Constitutional: Positive for appetite change  Negative for chills and fever  HENT: Negative for ear pain and sore throat  Eyes: Negative for pain and visual disturbance  Respiratory: Negative for cough and shortness of breath  Cardiovascular: Negative for chest pain and palpitations  Gastrointestinal: Positive for abdominal pain  Negative for nausea and vomiting  Genitourinary: Negative for dysuria and hematuria  Musculoskeletal: Negative for arthralgias and back pain  Skin: Negative for color change and rash  Neurological: Negative for dizziness and syncope  All other systems reviewed and are negative        Past Medical and Surgical History:   Past Medical History:   Diagnosis Date   • Ankylosing spondylitis ([facility] )    • Anxiety    • Bowel obstruction ([facility] )    • Clostridium difficile colitis 9/13/2018   • Colitis    • 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[facility] ) 9/13/2018   • Pancreatitis    • Ulcerative colitis ([facility] )        Past Surgical History:   Procedure Laterality Date   • COLECTOMY TOTAL      with ileal pouch and anastemosis   • IR PICC PLACEMENT SINGLE LUMEN  3/1/2022   • TOTAL COLECTOMY         Meds/Allergies:  Prior to Admission medications    Medication Sig Start Date End Date Taking? Authorizing Provider   DULoxetine (CYMBALTA) 60 mg delayed release capsule Take 1 capsule (60 mg total) by mouth daily 6/10/22   Vaughn Cardoza DO   HYDROmorphone (DILAUDID) 2 mg tablet Take 3 tablets (6 mg total) by mouth every 6 (six) hours as needed for severe pain (less if possible) Max Daily Amount: 24 mg 11/7/22   Vaughn Cardoza DO   naloxone [facility]) 4 mg/0 1 mL nasal spray Administer 1 spray into a nostril  If no response after 2-3 minutes, give another dose in the other nostril using a new spray  11/7/22   Vaughn Cardoza DO   naloxone [facility]) 4 mg/0 1 mL nasal spray Spray into one nostril if excess sedation  Call 911  Repeat every 2-3 min in alternate nostril until awake or EMS arrives  11/3/22   Historical Provider, MD   nystatin (MYCOSTATIN) powder Apply topically 11/3/22 11/13/22  Historical Provider, MD   ondansetron (ZOFRAN) 4 mg tablet Take 1 tablet (4 mg total) by mouth every 6 (six) hours 11/13/22   Myrna Dominguez MD   pantoprazole (PROTONIX) 40 mg tablet Take 1 tablet (40 mg total) by mouth 2 (two) times a day 9/26/22 10/26/22  Donna Malik DO   levofloxacin (LEVAQUIN) 500 mg tablet  10/26/22 11/21/22  Historical Provider, MD   predniSONE 10 mg tablet Take 4 tablets (40 mg total) by mouth daily for 7 days, THEN 3 5 tablets (35 mg total) daily for 7 days, THEN 3 tablets (30 mg total) daily for 7 days, THEN 2 5 tablets (25 mg total) daily for 7 days, THEN 2 tablets (20 mg total) daily for 7 days, THEN 1 5 tablets (15 mg total) daily for 7 days, THEN 1 tablet (10 mg total) daily for 7 days, THEN 0 5 tablets (5 mg total) daily for 7 days    Patient not taking: Reported on 11/13/2022 10/7/22 11/21/22  Darlin Martinez DO   saccharomyces boulardii (FLORASTOR) 250 mg capsule Take 1 capsule (250 mg total) by mouth 2 (two) times a day  Patient not taking: Reported on 11/21/2022 8/27/22 11/21/22  Patrick Palafox DO     I have reviewed home medications with patient personally  Allergies: Allergies   Allergen Reactions   • Cefazolin Hives     Other reaction(s): Arrythmia (Abnormal Heartbeat), Rash Maculopapular   • Mesalamine GI Intolerance     Other reaction(s): Pancreatitis  Annotation - [id number]: pancreatitis   • Wound Dressings Rash     Coloplast ostomy Products        Social History:  Marital Status: Single   Occupation:   Patient Pre-hospital Living Situation: Home  Patient Pre-hospital Level of Mobility: walks  Patient Pre-hospital Diet Restrictions:   Substance Use History:   Social History     Substance and Sexual Activity   Alcohol Use Not Currently    Comment: pt states 5 a month/socially     Social History     Tobacco Use   Smoking Status Never   Smokeless Tobacco Never     Social History     Substance and Sexual Activity   Drug Use No       Family History:  History reviewed  No pertinent family history  Physical Exam:     Vitals:   Blood Pressure: 123/83 (11/21/22 0352)  Pulse: 90 (11/21/22 0352)  Temperature: 98 °F (36 7 °C) (11/21/22 0054)  Temp Source: Oral (11/21/22 0054)  Respirations: 22 (11/21/22 0352)  Weight - Scale: 74 8 kg (165 lb) (11/20/22 2340)  SpO2: 100 % (11/21/22 0352)    Physical Exam  Vitals reviewed  Constitutional:       General: He is not in acute distress  Appearance: He is well-developed  HENT:      Head: Normocephalic and atraumatic  Eyes:      Conjunctiva/sclera: Conjunctivae normal    Cardiovascular:      Rate and Rhythm: Normal rate and regular rhythm  Heart sounds: No murmur heard  Pulmonary:      Effort: Pulmonary effort is normal  No respiratory distress  Breath sounds: Normal breath sounds     Abdominal: General: Bowel sounds are normal  There is no distension  Palpations: Abdomen is soft  Tenderness: There is abdominal tenderness  There is no guarding  Comments: Skin irritation around stoma  Healthy appearing mucosa at stoma site     Musculoskeletal:         General: No swelling  Skin:     General: Skin is warm and dry  Capillary Refill: Capillary refill takes less than 2 seconds  Neurological:      Mental Status: He is alert and oriented to person, place, and time  Psychiatric:         Mood and Affect: Mood normal          Additional Data:     Lab Results:  Results from last 7 days   Lab Units 11/21/22  0004   WBC Thousand/uL 13 89*   HEMOGLOBIN g/dL 10 8*   HEMATOCRIT % 36 2*   PLATELETS Thousands/uL 438*   NEUTROS PCT % 67   LYMPHS PCT % 17   MONOS PCT % 11   EOS PCT % 3     Results from last 7 days   Lab Units 11/21/22  0004   SODIUM mmol/L 142   POTASSIUM mmol/L 4 0   CHLORIDE mmol/L 103   CO2 mmol/L 26   BUN mg/dL 10   CREATININE mg/dL 1 16   ANION GAP mmol/L 13   CALCIUM mg/dL 9 2   ALBUMIN g/dL 3 7   TOTAL BILIRUBIN mg/dL 0 42   ALK PHOS U/L 118*   ALT U/L 44   GLUCOSE RANDOM mg/dL 92     Results from last 7 days   Lab Units 11/18/22  1232   INR  0 96             Results from last 7 days   Lab Units 11/18/22  1232   LACTIC ACID mmol/L 1 5       Lines/Drains:  Invasive Devices     Peripheral Intravenous Line  Duration           Peripheral IV 11/21/22 Right Antecubital <1 day                    Imaging: Reviewed radiology reports from this admission including: abdominal/pelvic CT  No orders to display       EKG and Other Studies Reviewed on Admission:   · EKG: No EKG obtained  ** Please Note: This note has been constructed using a voice recognition system   **

## 2022-11-21 NOTE — PLAN OF CARE
Problem: PAIN - ADULT  Goal: Verbalizes/displays adequate comfort level or baseline comfort level  Description: Interventions:  - Encourage patient to monitor pain and request assistance  - Assess pain using appropriate pain scale  - Administer analgesics based on type and severity of pain and evaluate response  - Implement non-pharmacological measures as appropriate and evaluate response  - Consider cultural and social influences on pain and pain management  - Notify physician/advanced practitioner if interventions unsuccessful or patient reports new pain  11/21/2022 1001 by Anca Upton RN  Outcome: Progressing  11/21/2022 0833 by Anca Upton RN  Outcome: Progressing     Problem: GASTROINTESTINAL - ADULT  Goal: Maintains or returns to baseline bowel function  Description: INTERVENTIONS:  - Assess bowel function  - Encourage oral fluids to ensure adequate hydration  - Administer IV fluids if ordered to ensure adequate hydration  - Administer ordered medications as needed  - Encourage mobilization and activity  - Consider nutritional services referral to assist patient with adequate nutrition and appropriate food choices  11/21/2022 1001 by Anca Upton RN  Outcome: Progressing  11/21/2022 0833 by Anca Upton RN  Outcome: Progressing

## 2022-11-22 PROBLEM — G89.4 CHRONIC PAIN SYNDROME: Status: ACTIVE | Noted: 2022-11-22

## 2022-11-22 LAB
ANION GAP SERPL CALCULATED.3IONS-SCNC: 6 MMOL/L (ref 4–13)
BUN SERPL-MCNC: 6 MG/DL (ref 5–25)
C DIFF TOX GENS STL QL NAA+PROBE: NEGATIVE
CALCIUM SERPL-MCNC: 8.3 MG/DL (ref 8.3–10.1)
CAMPYLOBACTER DNA SPEC NAA+PROBE: NORMAL
CHLORIDE SERPL-SCNC: 108 MMOL/L (ref 96–108)
CO2 SERPL-SCNC: 27 MMOL/L (ref 21–32)
CREAT SERPL-MCNC: 0.83 MG/DL (ref 0.6–1.3)
ERYTHROCYTE [DISTWIDTH] IN BLOOD BY AUTOMATED COUNT: 15.9 % (ref 11.6–15.1)
GFR SERPL CREATININE-BSD FRML MDRD: 118 ML/MIN/1.73SQ M
GLUCOSE P FAST SERPL-MCNC: 77 MG/DL (ref 65–99)
GLUCOSE SERPL-MCNC: 77 MG/DL (ref 65–140)
HCT VFR BLD AUTO: 30.7 % (ref 36.5–49.3)
HGB BLD-MCNC: 9.1 G/DL (ref 12–17)
MCH RBC QN AUTO: 19.1 PG (ref 26.8–34.3)
MCHC RBC AUTO-ENTMCNC: 29.6 G/DL (ref 31.4–37.4)
MCV RBC AUTO: 64 FL (ref 82–98)
PLATELET # BLD AUTO: 368 THOUSANDS/UL (ref 149–390)
PMV BLD AUTO: 8.5 FL (ref 8.9–12.7)
POTASSIUM SERPL-SCNC: 3.9 MMOL/L (ref 3.5–5.3)
RBC # BLD AUTO: 4.77 MILLION/UL (ref 3.88–5.62)
SALMONELLA DNA SPEC QL NAA+PROBE: NORMAL
SHIGA TOXIN STX GENE SPEC NAA+PROBE: NORMAL
SHIGELLA DNA SPEC QL NAA+PROBE: NORMAL
SODIUM SERPL-SCNC: 141 MMOL/L (ref 135–147)
WBC # BLD AUTO: 8.84 THOUSAND/UL (ref 4.31–10.16)

## 2022-11-22 RX ORDER — LOPERAMIDE HYDROCHLORIDE 2 MG/1
2 CAPSULE ORAL EVERY 4 HOURS PRN
Status: DISCONTINUED | OUTPATIENT
Start: 2022-11-22 | End: 2022-11-23 | Stop reason: HOSPADM

## 2022-11-22 RX ORDER — ONDANSETRON 2 MG/ML
4 INJECTION INTRAMUSCULAR; INTRAVENOUS EVERY 6 HOURS PRN
Refills: 0
Start: 2022-11-22

## 2022-11-22 RX ORDER — OXYCODONE HYDROCHLORIDE AND ACETAMINOPHEN 5; 325 MG/1; MG/1
1 TABLET ORAL EVERY 4 HOURS PRN
Refills: 0
Start: 2022-11-22 | End: 2022-12-02

## 2022-11-22 RX ORDER — SODIUM CHLORIDE 9 MG/ML
75 INJECTION, SOLUTION INTRAVENOUS CONTINUOUS
Refills: 0
Start: 2022-11-22

## 2022-11-22 RX ORDER — LOPERAMIDE HYDROCHLORIDE 2 MG/1
2 CAPSULE ORAL EVERY 4 HOURS PRN
Qty: 30 CAPSULE | Refills: 0
Start: 2022-11-22

## 2022-11-22 RX ORDER — ENOXAPARIN SODIUM 100 MG/ML
40 INJECTION SUBCUTANEOUS DAILY
Refills: 0
Start: 2022-11-23

## 2022-11-22 RX ADMIN — HYDROMORPHONE HYDROCHLORIDE 0.5 MG: 1 INJECTION, SOLUTION INTRAMUSCULAR; INTRAVENOUS; SUBCUTANEOUS at 15:30

## 2022-11-22 RX ADMIN — HYDROMORPHONE HYDROCHLORIDE 0.2 MG: 0.2 INJECTION, SOLUTION INTRAMUSCULAR; INTRAVENOUS; SUBCUTANEOUS at 13:38

## 2022-11-22 RX ADMIN — HYDROMORPHONE HYDROCHLORIDE 0.2 MG: 0.2 INJECTION, SOLUTION INTRAMUSCULAR; INTRAVENOUS; SUBCUTANEOUS at 03:10

## 2022-11-22 RX ADMIN — HYDROMORPHONE HYDROCHLORIDE 0.5 MG: 1 INJECTION, SOLUTION INTRAMUSCULAR; INTRAVENOUS; SUBCUTANEOUS at 00:50

## 2022-11-22 RX ADMIN — HYDROMORPHONE HYDROCHLORIDE 0.2 MG: 0.2 INJECTION, SOLUTION INTRAMUSCULAR; INTRAVENOUS; SUBCUTANEOUS at 17:39

## 2022-11-22 RX ADMIN — HYDROMORPHONE HYDROCHLORIDE 0.5 MG: 1 INJECTION, SOLUTION INTRAMUSCULAR; INTRAVENOUS; SUBCUTANEOUS at 07:21

## 2022-11-22 RX ADMIN — HYDROMORPHONE HYDROCHLORIDE 0.5 MG: 1 INJECTION, SOLUTION INTRAMUSCULAR; INTRAVENOUS; SUBCUTANEOUS at 11:06

## 2022-11-22 RX ADMIN — HYDROMORPHONE HYDROCHLORIDE 0.2 MG: 0.2 INJECTION, SOLUTION INTRAMUSCULAR; INTRAVENOUS; SUBCUTANEOUS at 22:22

## 2022-11-22 RX ADMIN — LOPERAMIDE HYDROCHLORIDE 2 MG: 2 CAPSULE ORAL at 22:22

## 2022-11-22 RX ADMIN — LOPERAMIDE HYDROCHLORIDE 2 MG: 2 CAPSULE ORAL at 13:36

## 2022-11-22 RX ADMIN — PSYLLIUM HUSK 1 PACKET: 3.4 POWDER ORAL at 09:08

## 2022-11-22 RX ADMIN — HYDROMORPHONE HYDROCHLORIDE 0.5 MG: 1 INJECTION, SOLUTION INTRAMUSCULAR; INTRAVENOUS; SUBCUTANEOUS at 19:54

## 2022-11-22 RX ADMIN — DULOXETINE HYDROCHLORIDE 60 MG: 60 CAPSULE, DELAYED RELEASE ORAL at 09:08

## 2022-11-22 RX ADMIN — ENOXAPARIN SODIUM 40 MG: 40 INJECTION SUBCUTANEOUS at 09:08

## 2022-11-22 NOTE — ASSESSMENT & PLAN NOTE
Patient presents abdominal pain, still uncontrolled intervals doses of pain medicine - complex PMH including IBS status post colectomy with J-pouch formation, proctitis, recent surgery 10/18 for diverting ileostomy complicated by prolapse and localized intra-abdominal infection  Plan for reconstruction of pouch in April at [facility] [place]  CT a/p 11/18 : There is no evidence of  bowel obstruction  Contrast is seen extending into the ileostomy bag through the right lower quadrant ostomy stoma  • GI recs:Again noted is fluid-filled J-pouch with some mucosal enhancement, there is no opacification of the J-pouch  No longer on Flagyl, prednisone, and per patient his surgeon stated no indication for biologic therapy as he felt there was no evidence of Crohn's  Recommended to touch base with [facility] surgeon since he has had recurrent postoperative pain  CRP is 12 7 which is around where it was in September but was markedly elevated in August at 180 7  Surgical pathology was reviewed from proximal small-bowel resection which showed - Small intestine with active chronic enteritis, transmural defect(measuring 5cm) and with marked active serositis  Negative for dysplasia, negative for granuloma  Margin closer to perforation is viable and shows active chronic enteritis  Other margin is viable and shows acute serositis  Immunohistochemical stain for CMV is positive in rare cells  Ordered fecal calprotectin as well which is pending, stool C diff and enteric panel negative, can use Imodium p r n  • Patient contacted surgeon at [facility] [place] and was waiting a call back  Reports that he reach out to ostomy nurse and she is going to discuss with surgical team about possible transfer, patient feels that ostomy is retracting is having leakage around ostomy appliance which is irritating the skin  Wound nurse was going to see him today here but patient is trying to facilitate transfer back to [facility] [place]

## 2022-11-22 NOTE — UTILIZATION REVIEW
Initial Clinical Review    Admission: Date/Time/Statement:   Admission Orders (From admission, onward)     Ordered        11/21/22 0249  Place in Observation  Once                      Orders Placed This Encounter   Procedures   • Place in Observation     Standing Status:   Standing     Number of Occurrences:   1     Order Specific Question:   Level of Care     Answer:   Med Surg [16]     ED Arrival Information     Expected   -    Arrival   11/20/2022 23:35    Acuity   Urgent            Means of arrival   Stretcher    Escorted by   Family Member    Service   Hospitalist    Admission type   Emergency            Arrival complaint   Colostemy bag problem           Chief Complaint   Patient presents with   • Abdominal Pain     Pt reported abdominal pain at surgical site for stoma  Increased out put, nausea and vomiting  No fevers        Initial Presentation:   78-year-old male with complex past medical history including IBS status post colectomy with J-pouch formation, pouchitis, multiple bouts of obstruction and partial obstruction, recent surgery for diverting ileostomy complicated by prolapse and localized intra-abdominal infection presenting for evaluation of pain around ileostomy site  Presents tachycardic, abd nondistended with normal bowel sounds  Pink healthy-appearing mucosa at stoma site  Surrounding area of skin irritation in area of ostomy bag  Placed in observation status for abd pain  11/22/22:  High output from ostomy, skin irritated around stoma  Started on Imodium  Continues to require multiple doses IV Dilaudid  GI consulted  Per GI: Abd pain  Still complaining of pain and high output from ostomy leaking any feels that the ostomy is retracting any called [facility] and spoke to the ostomy nurse she is going to touch base surgical team   States that this is exquisitely painful with any passage of stool      ED Triage Vitals   Temperature Pulse Respirations Blood Pressure SpO2   11/21/22 0054 11/20/22 2340 11/20/22 2340 11/20/22 2340 11/20/22 2340   98 °F (36 7 °C) 103 20 127/80 99 %      Temp Source Heart Rate Source Patient Position - Orthostatic VS BP Location FiO2 (%)   11/21/22 0054 11/20/22 2340 11/20/22 2340 11/20/22 2340 --   Oral Monitor Sitting Left arm       Pain Score       11/20/22 2340       9          Wt Readings from Last 1 Encounters:   11/20/22 74 8 kg (165 lb)     Additional Vital Signs:   11/21/22 23:10:38 97 8 °F (36 6 °C) 72 16 115/68 84 97 % -- --   11/21/22 20:42:25 97 8 °F (36 6 °C) 85 19 121/79 93 96 % -- Lying   11/21/22 17:58:18 97 2 °F (36 2 °C) Abnormal  -- -- -- -- -- -- --   11/21/22 14:41:18 -- 94 -- 121/79 93 98 % -- --   11/21/22 07:25:55 97 4 °F (36 3 °C) Abnormal  81 20 123/80 94 98 % None (Room air) Lying   11/21/22 0352 -- 90 22 123/83 98 100 % None (Room air) Lying   11/21/22 0054 98 °F (36 7 °C) -- -- -- -- -- -- --   11/21/22 0007 -- -- -- -- -- -- None (Room air) --   11/20/22 2340 -- 103 20 127/80 -- 99 % None (Room air) Sitting     Pertinent Labs/Diagnostic Test Results:     Results from last 7 days   Lab Units 11/21/22  0257   SARS-COV-2  Negative     Results from last 7 days   Lab Units 11/22/22  0448 11/21/22  1150 11/21/22  0707 11/21/22  0004 11/18/22  1232   WBC Thousand/uL 8 84 9 12 10 81* 13 89* 15 88*   HEMOGLOBIN g/dL 9 1* 9 6* 8 9* 10 8* 10 5*   HEMATOCRIT % 30 7* 32 7* 29 3* 36 2* 35 5*   PLATELETS Thousands/uL 368 346 329 438* 403*   NEUTROS ABS Thousands/µL  --   --  7 12 9 42* 12 64*     Results from last 7 days   Lab Units 11/22/22  0448 11/21/22  0707 11/21/22  0004 11/18/22  1232 11/18/22  0201   SODIUM mmol/L 141 140 142 140 140   POTASSIUM mmol/L 3 9 3 5 4 0 3 8 4 3   CHLORIDE mmol/L 108 106 103 104 104   CO2 mmol/L 27 26 26 28 29   ANION GAP mmol/L 6 8 13 8 7   BUN mg/dL 6 9 10 9 10   CREATININE mg/dL 0 83 0 91 1 16 1 04 1 06   EGFR ml/min/1 73sq m 118 113 84 96 94   CALCIUM mg/dL 8 3 8 3 9 2 9 0 9 2     Results from last 7 days   Lab Units 11/21/22  0004 11/18/22  1232 11/18/22  0201   ALT U/L 44 49 60   ALK PHOS U/L 118* 108 123*   TOTAL PROTEIN g/dL 7 6 7 3 7 6   ALBUMIN g/dL 3 7 3 7 3 8   TOTAL BILIRUBIN mg/dL 0 42 0 64 0 39     Results from last 7 days   Lab Units 11/22/22  0448 11/21/22  0707 11/21/22  0004 11/18/22  1232 11/18/22  0201   GLUCOSE RANDOM mg/dL 77 85 92 83 99     Results from last 7 days   Lab Units 11/18/22  1232   PROTIME seconds 12 9   INR  0 96   PTT seconds 26     Results from last 7 days   Lab Units 11/18/22  1232   LACTIC ACID mmol/L 1 5     Results from last 7 days   Lab Units 11/21/22  0707   FERRITIN ng/mL 109     Results from last 7 days   Lab Units 11/21/22  0004 11/18/22  1232   LIPASE u/L 137 117     Results from last 7 days   Lab Units 11/21/22  0707   CRP mg/L 12 7*      Ref Range & Units 11/21/22 1608   C difficile toxin by PCR Negative       Ref Range & Units 11/21/22 1608   Salmonella sp PCR None Detected None Detected    Shigella sp/Enteroinvasive E  coli (EIEC) PCR None Detected None Detected    Campylobacter sp (jejuni and coli) PCR None Detected None Detected    Shiga toxin 1/Shiga toxin 2 genes PCR None Detecte        ED Treatment:   Medication Administration from 11/20/2022 2333 to 11/21/2022 0453       Date/Time Order Dose Route Action     11/21/2022 0004 EST sodium chloride 0 9 % bolus 1,000 mL 1,000 mL Intravenous New Bag     11/21/2022 0011 EST HYDROmorphone (DILAUDID) injection 1 5 mg 1 5 mg Intravenous Given     11/21/2022 0057 EST HYDROmorphone (DILAUDID) injection 1 mg 1 mg Intravenous Given     11/21/2022 0350 EST oxyCODONE-acetaminophen (PERCOCET) 5-325 mg per tablet 1 tablet 1 tablet Oral Given        Past Medical History:   Diagnosis Date   • Ankylosing spondylitis ([facility] )    • Anxiety    • Bowel obstruction (HCC)    • Clostridium difficile colitis 9/13/2018   • Colitis    • 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[facility] ) 9/13/2018   • Pancreatitis    • Ulcerative colitis ([facility] )      Present on Admission:  • Abdominal pain  • Ankylosing spondylitis (HCC)  • CAR (generalized anxiety disorder)  • Anemia    Admitting Diagnosis: Abdominal pain [R10 9]  Age/Sex: 32 y o  male  Admission Orders:  Consult GI  Strict contact & hand hygiene isolation    Scheduled Medications:  DULoxetine, 60 mg, Oral, Daily  enoxaparin, 40 mg, Subcutaneous, Daily  psyllium, 1 packet, Oral, Daily    Continuous IV Infusions:  sodium chloride, 75 mL/hr, Intravenous, Continuous    PRN Meds:  HYDROmorphone, 0 5 mg, Intravenous, Q4H PRN, 11/21 x5, 11/22 x3  HYDROmorphone, 0 2 mg, Intravenous, Q3H PRN, 11/21 x1, 11/22 x1  loperamide (IMODIUM) capsule 2 mg, Q4H, PRN, 11/22 x1  ondansetron, 4 mg, Intravenous, Q6H PRN, 11/21 x1  oxyCODONE-acetaminophen, 1 tablet, Oral, Q4H PRN    Network Utilization Review Department  ATTENTION: Please call with any questions or concerns to [phone] and carefully listen to the prompts so that you are directed to the right person  All voicemails are confidential   Porfirio Cason all requests for admission clinical reviews, approved or denied determinations and any other requests to dedicated fax number below belonging to the campus where the patient is receiving treatment   List of dedicated fax numbers for the Facilities:  [address] DENIALS (Administrative/Medical Necessity) [phone]   [address] (Maternity/NICU/Pediatrics) [phone]    Ashtyn Perez [phone]   [facility] [phone]   [facility] [phone]   [facility] [place] [address] Medical [address] [address] [facility] [place] [address] [phone] [address] [facility] [address] [facility] [phone]

## 2022-11-22 NOTE — PROGRESS NOTES
Progress Note - Eliza Guzman 32 y o  male MRN: [id number]    Unit/Bed#: [address] [place] [address] Encounter: [id number]        Assessment/Plan:  Abdominal pain  Patient presents abdominal pain, still uncontrolled intervals doses of pain medicine - complex PMH including IBD status post colectomy with J-pouch formation, proctitis, recent surgery 10/18 for diverting ileostomy complicated by prolapse and localized intra-abdominal infection   Plan for reconstruction of pouch in April at [facility]   • CT a/p 11/18 : There is no evidence of  bowel obstruction  Contrast is seen extending into the ileostomy bag through the right lower quadrant ostomy stoma  • Again noted is fluid-filled J-pouch with some mucosal enhancement, there is no opacification of the J-pouch  • No longer on Flagyl, prednisone, and per patient his surgeon stated no indication for biologic therapy as he felt there was no evidence of Crohn's  • Recommended to touch base with [facility] surgeon since he has had recurrent postoperative pain  • CRP is 12 7 which is around where it was in September but was markedly elevated in August at 180 7  • Surgical pathology was reviewed from proximal small-bowel resection which showed - Small intestine with active chronic enteritis, transmural defect(measuring 5cm) and with marked active serositis  Negative for dysplasia, negative for granuloma  Margin closer to perforation is viable and shows active chronic enteritis  Other margin is viable and shows acute serositis  Immunohistochemical stain for CMV is positive in rare cells  • Ordered fecal calprotectin as well which is pending, stool C diff and enteric panel negative, can use Imodium p r n      Patient contacted surgeon at [facility] and was waiting a call back  Reports that he reach out to ostomy nurse and she is going to discuss with surgical team about possible transfer, patient feels that ostomy is retracting is having leakage around ostomy appliance which is irritating the skin    Wound nurse was going to see him today here but patient is trying to facilitate transfer back to [address]  Subjective:   Patient is lying in bed  Still complaining of pain and high output from ostomy leaking any feels that the ostomy is retracting any called [facility] and toxin the ostomy nurse she is going to touch base surgical team   States that this is exquisitely painful with any passage of stool      Objective:     Vitals: /75   Pulse 67   Temp 97 8 °F (36 6 °C) (Oral)   Resp 18   Ht 5' 9" (1 753 m)   Wt 74 8 kg (165 lb)   SpO2 98%   BMI 24 37 kg/m²       Physical Exam:  Gen-alert no acute distress  Abd-positive bowel sounds, nondistended, significantly tender around ostomy with some skin irritation noted around bag       Lab, Imaging and other studies:   Recent Results (from the past 72 hour(s))   CBC and differential    Collection Time: 11/21/22 12:04 AM   Result Value Ref Range    WBC 13 89 (H) 4 31 - 10 16 Thousand/uL    RBC 5 66 (H) 3 88 - 5 62 Million/uL    Hemoglobin 10 8 (L) 12 0 - 17 0 g/dL    Hematocrit 36 2 (L) 36 5 - 49 3 %    MCV 64 (L) 82 - 98 fL    MCH 19 1 (L) 26 8 - 34 3 pg    MCHC 29 8 (L) 31 4 - 37 4 g/dL    RDW 16 4 (H) 11 6 - 15 1 %    MPV 9 1 8 9 - 12 7 fL    Platelets 374 (H) 980 - 390 Thousands/uL    nRBC 0 /100 WBCs    Neutrophils Relative 67 43 - 75 %    Immat GRANS % 1 0 - 2 %    Lymphocytes Relative 17 14 - 44 %    Monocytes Relative 11 4 - 12 %    Eosinophils Relative 3 0 - 6 %    Basophils Relative 1 0 - 1 %    Neutrophils Absolute 9 42 (H) 1 85 - 7 62 Thousands/µL    Immature Grans Absolute 0 08 0 00 - 0 20 Thousand/uL    Lymphocytes Absolute 2 34 0 60 - 4 47 Thousands/µL    Monocytes Absolute 1 49 (H) 0 17 - 1 22 Thousand/µL    Eosinophils Absolute 0 43 0 00 - 0 61 Thousand/µL    Basophils Absolute 0 13 (H) 0 00 - 0 10 Thousands/µL   Comprehensive metabolic panel    Collection Time: 11/21/22 12:04 AM   Result Value Ref Range    Sodium 142 135 - 147 mmol/L    Potassium 4 0 3 5 - 5 3 mmol/L    Chloride 103 96 - 108 mmol/L    CO2 26 21 - 32 mmol/L    ANION GAP 13 4 - 13 mmol/L    BUN 10 5 - 25 mg/dL    Creatinine 1 16 0 60 - 1 30 mg/dL    Glucose 92 65 - 140 mg/dL    Calcium 9 2 8 3 - 10 1 mg/dL    AST      ALT 44 12 - 78 U/L    Alkaline Phosphatase 118 (H) 46 - 116 U/L    Total Protein 7 6 6 4 - 8 4 g/dL    Albumin 3 7 3 5 - 5 0 g/dL    Total Bilirubin 0 42 0 20 - 1 00 mg/dL    eGFR 84 ml/min/1 73sq m   Lipase    Collection Time: 11/21/22 12:04 AM   Result Value Ref Range    Lipase 137 73 - 393 u/L   COVID only    Collection Time: 11/21/22  2:57 AM    Specimen: Nose; Nares   Result Value Ref Range    SARS-CoV-2 Negative Negative   Basic metabolic panel    Collection Time: 11/21/22  7:07 AM   Result Value Ref Range    Sodium 140 135 - 147 mmol/L    Potassium 3 5 3 5 - 5 3 mmol/L    Chloride 106 96 - 108 mmol/L    CO2 26 21 - 32 mmol/L    ANION GAP 8 4 - 13 mmol/L    BUN 9 5 - 25 mg/dL    Creatinine 0 91 0 60 - 1 30 mg/dL    Glucose 85 65 - 140 mg/dL    Glucose, Fasting 85 65 - 99 mg/dL    Calcium 8 3 8 3 - 10 1 mg/dL    eGFR 113 ml/min/1 73sq m   CBC and differential    Collection Time: 11/21/22  7:07 AM   Result Value Ref Range    WBC 10 81 (H) 4 31 - 10 16 Thousand/uL    RBC 4 57 3 88 - 5 62 Million/uL    Hemoglobin 8 9 (L) 12 0 - 17 0 g/dL    Hematocrit 29 3 (L) 36 5 - 49 3 %    MCV 64 (L) 82 - 98 fL    MCH 19 5 (L) 26 8 - 34 3 pg    MCHC 30 4 (L) 31 4 - 37 4 g/dL    RDW 16 1 (H) 11 6 - 15 1 %    MPV 8 1 (L) 8 9 - 12 7 fL    Platelets 528 992 - 370 Thousands/uL    nRBC 0 /100 WBCs    Neutrophils Relative 65 43 - 75 %    Immat GRANS % 1 0 - 2 %    Lymphocytes Relative 18 14 - 44 %    Monocytes Relative 11 4 - 12 %    Eosinophils Relative 4 0 - 6 %    Basophils Relative 1 0 - 1 %    Neutrophils Absolute 7 12 1 85 - 7 62 Thousands/µL    Immature Grans Absolute 0 07 0 00 - 0 20 Thousand/uL    Lymphocytes Absolute 1 89 0 60 - 4 47 Thousands/µL    Monocytes Absolute 1 18 0 17 - 1 22 Thousand/µL Eosinophils Absolute 0 45 0 00 - 0 61 Thousand/µL    Basophils Absolute 0 10 0 00 - 0 10 Thousands/µL   C-reactive protein    Collection Time: 11/21/22  7:07 AM   Result Value Ref Range    CRP 12 7 (H) <3 0 mg/L   Vitamin B12    Collection Time: 11/21/22  7:07 AM   Result Value Ref Range    Vitamin B-12 454 100 - 900 pg/mL   Folate    Collection Time: 11/21/22  7:07 AM   Result Value Ref Range    Folate 6 7 3 1 - 17 5 ng/mL   Iron Saturation %    Collection Time: 11/21/22  7:07 AM   Result Value Ref Range    Iron Saturation 33 20 - 50 %    TIBC 250 250 - 450 ug/dL    Iron 83 65 - 175 ug/dL   Ferritin    Collection Time: 11/21/22  7:07 AM   Result Value Ref Range    Ferritin 109 8 - 388 ng/mL   CBC    Collection Time: 11/21/22 11:50 AM   Result Value Ref Range    WBC 9 12 4 31 - 10 16 Thousand/uL    RBC 5 08 3 88 - 5 62 Million/uL    Hemoglobin 9 6 (L) 12 0 - 17 0 g/dL    Hematocrit 32 7 (L) 36 5 - 49 3 %    MCV 64 (L) 82 - 98 fL    MCH 18 9 (L) 26 8 - 34 3 pg    MCHC 29 4 (L) 31 4 - 37 4 g/dL    RDW 16 0 (H) 11 6 - 15 1 %    Platelets 596 108 - 210 Thousands/uL    MPV 8 3 (L) 8 9 - 12 7 fL   CBC    Collection Time: 11/22/22  4:48 AM   Result Value Ref Range    WBC 8 84 4 31 - 10 16 Thousand/uL    RBC 4 77 3 88 - 5 62 Million/uL    Hemoglobin 9 1 (L) 12 0 - 17 0 g/dL    Hematocrit 30 7 (L) 36 5 - 49 3 %    MCV 64 (L) 82 - 98 fL    MCH 19 1 (L) 26 8 - 34 3 pg    MCHC 29 6 (L) 31 4 - 37 4 g/dL    RDW 15 9 (H) 11 6 - 15 1 %    Platelets 562 235 - 267 Thousands/uL    MPV 8 5 (L) 8 9 - 12 7 fL   Basic metabolic panel    Collection Time: 11/22/22  4:48 AM   Result Value Ref Range    Sodium 141 135 - 147 mmol/L    Potassium 3 9 3 5 - 5 3 mmol/L    Chloride 108 96 - 108 mmol/L    CO2 27 21 - 32 mmol/L    ANION GAP 6 4 - 13 mmol/L    BUN 6 5 - 25 mg/dL    Creatinine 0 83 0 60 - 1 30 mg/dL    Glucose 77 65 - 140 mg/dL    Glucose, Fasting 77 65 - 99 mg/dL    Calcium 8 3 8 3 - 10 1 mg/dL    eGFR 118 ml/min/1 73sq m

## 2022-11-22 NOTE — ASSESSMENT & PLAN NOTE
History of thalassemia minor, hemoglobin downtrended today morning likely dilutional  Blood in ileostomy    Does not appear to have any active bleeding  · Trend hemoglobin  · Iron studies, B12, folate Fall

## 2022-11-22 NOTE — PROGRESS NOTES
[facility] Progress Note  Patient: Amber Rivero 32 y o  male   MRN: [id number]  PCP: Perry Go DO  Unit/Bed#: 2 [address] Encounter: [id number]  Date Of Visit: 11/21/22    Problem List:    Principal Problem:    Abdominal pain  Active Problems: Ankylosing spondylitis (HCC)    Anemia    CAR (generalized anxiety disorder)      Assessment & Plan:    * Abdominal pain  Assessment & Plan  Patient presents abdominal pain, still uncontrolled intervals doses of pain medicine - complex PMH including IBS status post colectomy with J-pouch formation, proctitis, recent surgery 10/18 for diverting ileostomy complicated by prolapse and localized intra-abdominal infection  Plan for reconstruction of pouch in April at [facility]  CT a/p 11/18 : There is no evidence of  bowel obstruction  Contrast is seen extending into the ileostomy bag through the right lower quadrant ostomy stoma  Again noted is fluid-filled J-pouch with some mucosal enhancement, there is no opacification of the J-pouch  No longer on Flagyl, prednisone, per patient his surgeon stated no indication for biologic therapy  Admitted for ongoing pain and significant ileostomy output  Abdominal exam appears benign  Leukocytosis downtrending  · Continue pain medication p r n  · Clear liquid diet-advanced diet as tolerated  · Gradually transition to p o  Meds  · Add Metamucil for ileostomy output  · Monitor abdominal exam  · Follow-up CBC, monitor for bleeding  · GI evaluation  · Stool studies  · Advised to follow-up at [facility] after recent surgical intervention  Patient reports that he as left message phone call surgeon awaiting reply  Anemia  Assessment & Plan  History of thalassemia minor, hemoglobin downtrended today morning likely dilutional  ?  Blood in ileostomy    Does not appear to have any active bleeding  · Trend hemoglobin  · Iron studies, B12, folate    [facility] ([facility]  Assessment & Plan  Not on biologic therapy    CAR (generalized anxiety disorder)  Assessment & Plan  Continue Cymbalta 60 mg daily          VTE Pharmacologic Prophylaxis:   Moderate Risk (Score 3-4) - Pharmacological DVT Prophylaxis Ordered: enoxaparin (Lovenox)  Patient Centered Rounds: I performed bedside rounds with nursing staff today  Discussions with Specialists or Other Care Team Provider:  Yes    Education and Discussions with Family / Patient: Discussed with patient  Time Spent for Care: 45 minutes  More than 50% of total time spent on counseling and coordination of care as described above  Current Length of Stay: 0 day(s)  Current Patient Status: Observation   Certification Statement: The patient will continue to require additional inpatient hospital stay due to Pain control, monitoring of possible bleeding, GI evaluation  Discharge Plan: Anticipate discharge tomorrow to home  Code Status: Level 1 - Full Code    Subjective:   Reports pain around ileostomy  Denies any fever, chills  Reports fluctuating stoma output    Reports nausea but no vomiting observe    HPI reviewed  Discharge notes from recent hospitalization at [facility] noted with complicated surgical clinical course  Patient did have sepsis secondary to intra-abdominal source, requiring surgical intervention and IV antibiotics  Patient reported episode of rapid response secondary to sepsis but denies any cardiac respiratory decompensation  Appears comfortable but reports being in pain  Reports also irritation around the ileostomy site from profuse output    Noted to have reddish output from ileostomy which patient was concerned about bleeding    Objective:     Vitals:   Temp (24hrs), Av 5 °F (36 4 °C), Min:97 2 °F (36 2 °C), Max:98 °F (36 7 °C)    Temp:  [97 2 °F (36 2 °C)-98 °F (36 7 °C)] 97 2 °F (36 2 °C)  HR:  [] 94  Resp:  [20-22] 20  BP: (121-127)/(79-83) 121/79  SpO2:  [98 %-100 %] 98 %  Body mass index is 24 37 kg/m²       Input and Output Summary (last 24 hours): Intake/Output Summary (Last 24 hours) at 11/21/2022 2011  Last data filed at 11/21/2022 1612  Gross per 24 hour   Intake 1220 ml   Output 1550 ml   Net -330 ml       Physical Exam:   Physical Exam  Constitutional:       General: He is not in acute distress  HENT:      Head: Normocephalic and atraumatic  Eyes:      Pupils: Pupils are equal, round, and reactive to light  Cardiovascular:      Rate and Rhythm: Normal rate  Pulmonary:      Effort: Pulmonary effort is normal  No respiratory distress  Breath sounds: No wheezing, rhonchi or rales  Comments: Diminished, clear  Chest:      Chest wall: No tenderness  Abdominal:      General: Bowel sounds are normal  There is no distension  Palpations: Abdomen is soft  Tenderness: There is abdominal tenderness (Mild tenderness lateral to stoma)  There is no guarding or rebound  Comments: Ileostomy in place with stool mixed with red colored liquid   Musculoskeletal:      Cervical back: Neck supple  Right lower leg: No edema  Left lower leg: No edema  Skin:     General: Skin is warm and dry  Findings: No rash  Neurological:      General: No focal deficit present  Mental Status: He is alert and oriented to person, place, and time  Mental status is at baseline  Cranial Nerves: No cranial nerve deficit  Psychiatric:         Mood and Affect: Affect is flat           Additional Data:     Labs:    Results from last 7 days   Lab Units 11/21/22  0707 11/21/22  0004   SODIUM mmol/L 140 142   POTASSIUM mmol/L 3 5 4 0   CHLORIDE mmol/L 106 103   CO2 mmol/L 26 26   BUN mg/dL 9 10   CREATININE mg/dL 0 91 1 16   ANION GAP mmol/L 8 13   CALCIUM mg/dL 8 3 9 2   ALBUMIN g/dL  --  3 7   TOTAL BILIRUBIN mg/dL  --  0 42   ALK PHOS U/L  --  118*   ALT U/L  --  44   GLUCOSE RANDOM mg/dL 85 92     Results from last 7 days   Lab Units 11/18/22  1232   INR  0 96             Results from last 7 days   Lab Units 11/18/22  1232   LACTIC ACID mmol/L 1 5       Lines/Drains:  Invasive Devices     Peripheral Intravenous Line  Duration           Peripheral IV 11/21/22 Right Antecubital <1 day          Drain  Duration           Ileostomy Continent (Abdominal pouch) RLQ <1 day                      Imaging: Reviewed radiology reports from this admission including: abdominal/pelvic CT    Recent Cultures (last 7 days):         Last 24 Hours Medication List:   Current Facility-Administered Medications   Medication Dose Route Frequency Provider Last Rate   • DULoxetine  60 mg Oral Daily Emmanuelle Dodge PA-C     • enoxaparin  40 mg Subcutaneous Daily Emmanuelle Dodge PA-C     • HYDROmorphone  0 5 mg Intravenous Q3H PRN Summer Finney MD     • ondansetron  4 mg Intravenous Q6H PRN Summer Finney MD     • oxyCODONE-acetaminophen  1 tablet Oral Q4H PRN Summer Finney MD     • sodium chloride  75 mL/hr Intravenous Continuous Summer Finney MD          Today, Patient Was Seen By: Summer Finney MD    ** Please Note: "This note has been constructed using a voice recognition system  Therefore there may be syntax, spelling, and/or grammatical errors   Please call if you have any questions  "**

## 2022-11-22 NOTE — PROGRESS NOTES
[facility] Internal [facility] Progress Note  Patient: Lesly Alpers 32 y o  male   MRN: [id number]  PCP: Indra Meneses DO  Unit/Bed#: [address] [place] [address] Encounter: [id number]  Date Of Visit: 11/22/22    Problem List:    Principal Problem:    Abdominal pain  Active Problems:    CAR (generalized anxiety disorder)    Ankylosing spondylitis ([facility] )    Anemia      Assessment & Plan:    * Abdominal pain  Assessment & Plan  Patient presents abdominal pain, still uncontrolled intervals doses of pain medicine - complex PMH including IBS status post colectomy with J-pouch formation, proctitis, recent surgery 10/18 for diverting ileostomy complicated by prolapse and localized intra-abdominal infection  Plan for reconstruction of pouch in April at [facility]  CT a/p 11/18 : There is no evidence of  bowel obstruction  Contrast is seen extending into the ileostomy bag through the right lower quadrant ostomy stoma  • GI recs:Again noted is fluid-filled J-pouch with some mucosal enhancement, there is no opacification of the J-pouch  No longer on Flagyl, prednisone, and per patient his surgeon stated no indication for biologic therapy as he felt there was no evidence of Crohn's  Recommended to touch base with [facility] surgeon since he has had recurrent postoperative pain  CRP is 12 7 which is around where it was in September but was markedly elevated in August at 180 7  Surgical pathology was reviewed from proximal small-bowel resection which showed - Small intestine with active chronic enteritis, transmural defect(measuring 5cm) and with marked active serositis  Negative for dysplasia, negative for granuloma  Margin closer to perforation is viable and shows active chronic enteritis  Other margin is viable and shows acute serositis  Immunohistochemical stain for CMV is positive in rare cells  Ordered fecal calprotectin as well which is pending, stool C diff and enteric panel negative, can use Imodium p r n    • Patient contacted surgeon at [facility] and was waiting a call back  Reports that he reach out to ostomy nurse and she is going to discuss with surgical team about possible transfer, patient feels that ostomy is retracting is having leakage around ostomy appliance which is irritating the skin  Wound nurse was going to see him today here but patient is trying to facilitate transfer back to [facility]  Anemia  Assessment & Plan  History of thalassemia minor, hemoglobin downtrended today morning likely dilutional  Blood in ileostomy  Does not appear to have any active bleeding  · Trend hemoglobin  · Iron studies, B12, folate    Ankylosing spondylitis (HCC)  Assessment & Plan  Not on biologic therapy    CAR (generalized anxiety disorder)  Assessment & Plan  Continue Cymbalta 60 mg daily          VTE Pharmacologic Prophylaxis:   Moderate Risk (Score 3-4) - Pharmacological DVT Prophylaxis Ordered: enoxaparin (Lovenox)  Patient Centered Rounds: I performed bedside rounds with nursing staff today  Discussions with Specialists or Other Care Team Provider:  Yes    Education and Discussions with Family / Patient: Discussed with patient  Time Spent for Care: 45 minutes  More than 50% of total time spent on counseling and coordination of care as described above  Current Length of Stay: 0 day(s)  Current Patient Status: Observation   Certification Statement: The patient will continue to require additional inpatient hospital stay due to Pain control, monitoring of possible bleeding, GI evaluation  Discharge Plan: Anticipate discharge tomorrow to home  Code Status: Level 1 - Full Code    Subjective:     Patient seen and examined in bed, patient reported abdominal pain and discomfort around his colostomy bag, patient reported he feels like his colostomy bag was leaking and he should his at home 2-3 times and feels like it has not been like this to now    Patient reported that he feels like ostomy bag will fall when he walks around and reported to nurse that ostomy was retracting and he wanted ostomy nurse for evaluation  Patient asked about if he spoke with primary specialists at [facility]  Patient also requested more pain medication to his nurse  Objective:     Vitals:   Temp (24hrs), Av 7 °F (36 5 °C), Min:97 2 °F (36 2 °C), Max:97 8 °F (36 6 °C)    Temp:  [97 2 °F (36 2 °C)-97 8 °F (36 6 °C)] 97 8 °F (36 6 °C)  HR:  [67-85] 67  Resp:  [16-19] 18  BP: (113-121)/(68-79) 113/75  SpO2:  [96 %-98 %] 98 %  Body mass index is 24 37 kg/m²  Input and Output Summary (last 24 hours): Intake/Output Summary (Last 24 hours) at 2022 1445  Last data filed at 2022 1236  Gross per 24 hour   Intake 300 ml   Output 1000 ml   Net -700 ml       Physical Exam:   Physical Exam  Vitals reviewed  Constitutional:       General: He is not in acute distress  Appearance: Normal appearance  HENT:      Head: Atraumatic  Nose: No congestion  Eyes:      General: No scleral icterus  Pupils: Pupils are equal, round, and reactive to light  Cardiovascular:      Rate and Rhythm: Normal rate  Heart sounds: No murmur heard  Pulmonary:      Effort: No respiratory distress  Breath sounds: No wheezing, rhonchi or rales  Abdominal:      General: There is no distension  Palpations: Abdomen is soft  Tenderness: There is abdominal tenderness  There is no guarding  Comments: Diffuse abdominal erythema, mell-colostomy,    Musculoskeletal:         General: No swelling or deformity  Normal range of motion  Cervical back: No tenderness  Right lower leg: No edema  Left lower leg: No edema  Feet:      Right foot:      Skin integrity: No callus  Skin:     General: Skin is warm  Findings: No lesion or rash  Neurological:      Mental Status: He is oriented to person, place, and time  Cranial Nerves: No cranial nerve deficit        Coordination: Coordination normal    Psychiatric:         Mood and Affect: Mood normal          Thought Content: Thought content normal          Additional Data:     Labs:    Results from last 7 days   Lab Units 11/22/22  0448 11/21/22  0707 11/21/22  0004   SODIUM mmol/L 141   < > 142   POTASSIUM mmol/L 3 9   < > 4 0   CHLORIDE mmol/L 108   < > 103   CO2 mmol/L 27   < > 26   BUN mg/dL 6   < > 10   CREATININE mg/dL 0 83   < > 1 16   ANION GAP mmol/L 6   < > 13   CALCIUM mg/dL 8 3   < > 9 2   ALBUMIN g/dL  --   --  3 7   TOTAL BILIRUBIN mg/dL  --   --  0 42   ALK PHOS U/L  --   --  118*   ALT U/L  --   --  44   GLUCOSE RANDOM mg/dL 77   < > 92    < > = values in this interval not displayed       Results from last 7 days   Lab Units 11/18/22  1232   INR  0 96             Results from last 7 days   Lab Units 11/18/22  1232   LACTIC ACID mmol/L 1 5       Lines/Drains:  Invasive Devices     Peripheral Intravenous Line  Duration           Peripheral IV 11/21/22 Right Antecubital 1 day          Drain  Duration           Ileostomy Continent (Abdominal pouch) RLQ 1 day                      Imaging: Reviewed radiology reports from this admission including: abdominal/pelvic CT    Recent Cultures (last 7 days):   Results from last 7 days   Lab Units 11/21/22  1608   C DIFF TOXIN B BY PCR  Negative       Last 24 Hours Medication List:   Current Facility-Administered Medications   Medication Dose Route Frequency Provider Last Rate   • DULoxetine  60 mg Oral Daily Emmanuelle Dodge PA-C     • enoxaparin  40 mg Subcutaneous Daily Emmanuelle Dodge PA-C     • HYDROmorphone  0 5 mg Intravenous Q4H PRN [place] Reyez     • HYDROmorphone  0 2 mg Intravenous Q3H PRN [place] Reyez     • loperamide  2 mg Oral Q4H PRN Shruthi Boles PA-C     • ondansetron  4 mg Intravenous Q6H PRN Kj Hammonds MD     • oxyCODONE-acetaminophen  1 tablet Oral Q4H PRN Kj Hammonds MD     • psyllium  1 packet Oral Daily Kj Hammonds MD     • sodium chloride  75 mL/hr Intravenous Continuous Kj Hammonds MD 75 mL/hr (11/21/22 2039) Today, Patient Was Seen By: Dillon Rodrigues MD    ** Please Note: "This note has been constructed using a voice recognition system  Therefore there may be syntax, spelling, and/or grammatical errors   Please call if you have any questions  "**

## 2022-11-23 VITALS
OXYGEN SATURATION: 100 % | BODY MASS INDEX: 24.44 KG/M2 | SYSTOLIC BLOOD PRESSURE: 125 MMHG | HEIGHT: 69 IN | HEART RATE: 82 BPM | DIASTOLIC BLOOD PRESSURE: 82 MMHG | WEIGHT: 165 LBS | TEMPERATURE: 97.8 F | RESPIRATION RATE: 16 BRPM

## 2022-11-23 RX ADMIN — HYDROMORPHONE HYDROCHLORIDE 0.5 MG: 1 INJECTION, SOLUTION INTRAMUSCULAR; INTRAVENOUS; SUBCUTANEOUS at 08:00

## 2022-11-23 RX ADMIN — SODIUM CHLORIDE 75 ML/HR: 0.9 INJECTION, SOLUTION INTRAVENOUS at 05:25

## 2022-11-23 RX ADMIN — HYDROMORPHONE HYDROCHLORIDE 0.5 MG: 1 INJECTION, SOLUTION INTRAMUSCULAR; INTRAVENOUS; SUBCUTANEOUS at 02:13

## 2022-11-23 RX ADMIN — HYDROMORPHONE HYDROCHLORIDE 0.2 MG: 0.2 INJECTION, SOLUTION INTRAMUSCULAR; INTRAVENOUS; SUBCUTANEOUS at 05:24

## 2022-11-23 NOTE — EMTALA/ACUTE CARE TRANSFER
[address] [facility] [address]  [place] [address]  Dept: [phone]      ACUTE CARE TRANSFER CONSENT    NAME Alis Blanton                                         1995                              MRN [id number]    I have been informed of my rights regarding examination, treatment, and transfer   by Dr Gertrude Florence MD    Benefits:  Higher level of care by prior Colorectal specialist at [facility]  Risks:  Worsening Ostomy function, abnormal ostomy position, abdominal content leakage, sepsis, infection, death  Transfer Request:  I acknowledge that my medical condition has been evaluated and explained to me by the treating physician or other qualified medical person and/or my attending physician who has recommended and offered to me further medical examination and treatment  I understand the Hospital's obligation with respect to the treatment and stabilization of my medical condition  I nevertheless request to be transferred  I release the Hospital, the doctor, and any other persons caring for me from all responsibility or liability for any injury or ill effects that may result from my transfer and agree to accept all responsibility for the consequences of my choice to transfer, rather than receive stabilizing treatment at the Hospital  I understand that because the transfer is my request, my insurance may not provide reimbursement for the services  The Hospital will assist and direct me and my family in how to make arrangements for transfer, but the hospital is not liable for any fees charged by the transport service  In spite of this understanding, I refuse to consent to further medical examination and treatment which has been offered to me, and request transfer to    I authorize the performance of emergency medical procedures and treatments upon me in both transit and upon arrival at the receiving facility    Additionally, I authorize the release of any and all medical records to the receiving facility and request they be transported with me, if possible  I authorize the performance of emergency medical procedures and treatments upon me in both transit and upon arrival at the receiving facility  Additionally, I authorize the release of any and all medical records to the receiving facility and request they be transported with me, if possible  I understand that the safest mode of transportation during a medical emergency is an ambulance and that the Hospital advocates the use of this mode of transport  Risks of traveling to the receiving facility by car, including absence of medical control, life sustaining equipment, such as oxygen, and medical personnel has been explained to me and I fully understand them  (MICKEY CORRECT BOX BELOW)  [  ]  I consent to the stated transfer and to be transported by ambulance/helicopter  [  ]  I consent to the stated transfer, but refuse transportation by ambulance and accept full responsibility for my transportation by car  I understand the risks of non-ambulance transfers and I exonerate the Hospital and its staff from any deterioration in my condition that results from this refusal     X___________________________________________    DATE  22  TIME________  Signature of patient or legally responsible individual signing on patient behalf           RELATIONSHIP TO PATIENT_________________________          Provider Certification    NAME Milagros Monroy                                        JEMAL 1995                              MRN [id number]    A medical screening exam was performed on the above named patient  Based on the examination:    Condition Necessitating Transfer Abdominal pain  Patient Condition:  Stable    Reason for Transfer:   Evaluation and Treatment for post-op abdominal pain at ostomy stoma         Transfer Requirements: Facility     [facility] available and qualified personnel available for treatment as acknowledged by    · Agreed to accept transfer and to provide appropriate medical treatment as acknowledged by          · Appropriate medical records of the examination and treatment of the patient are provided at the time of transfer   [address] [facility], [address] _______  · Transfer will be performed by qualified personnel from    and appropriate transfer equipment as required, including the use of necessary and appropriate life support measures  Provider Certification: I have examined the patient and explained the following risks and benefits of being transferred/refusing transfer to the patient/family:         Based on these reasonable risks and benefits to the patient and/or the unborn child(gonzalez), and based upon the information available at the time of the patient’s examination, I certify that the medical benefits reasonably to be expected from the provision of appropriate medical treatments at another medical facility outweigh the increasing risks, if any, to the individual’s medical condition, and in the case of labor to the unborn child, from effecting the transfer      X____________________________________________ DATE 11/22/22        TIME_______      ORIGINAL - SEND TO MEDICAL RECORDS   COPY - SEND WITH PATIENT DURING TRANSFER

## 2022-11-23 NOTE — PLAN OF CARE
Problem: PAIN - ADULT  Goal: Verbalizes/displays adequate comfort level or baseline comfort level  Description: Interventions:  - Encourage patient to monitor pain and request assistance  - Assess pain using appropriate pain scale  - Administer analgesics based on type and severity of pain and evaluate response  - Implement non-pharmacological measures as appropriate and evaluate response  - Consider cultural and social influences on pain and pain management  - Notify physician/advanced practitioner if interventions unsuccessful or patient reports new pain  Outcome: Progressing     Problem: GASTROINTESTINAL - ADULT  Goal: Maintains or returns to baseline bowel function  Description: INTERVENTIONS:  - Assess bowel function  - Encourage oral fluids to ensure adequate hydration  - Administer IV fluids if ordered to ensure adequate hydration  - Administer ordered medications as needed  - Encourage mobilization and activity  - Consider nutritional services referral to assist patient with adequate nutrition and appropriate food choices  Outcome: Progressing     Problem: Potential for Falls  Goal: Patient will remain free of falls  Description: INTERVENTIONS:  - Educate patient/family on patient safety including physical limitations  - Instruct patient to call for assistance with activity   - Consult OT/PT to assist with strengthening/mobility   - Keep Call bell within reach  - Keep bed low and locked with side rails adjusted as appropriate  - Keep care items and personal belongings within reach  - Initiate and maintain comfort rounds  - Make Fall Risk Sign visible to staff  - Offer Toileting every 2 Hours, in advance of need  - Initiate/Maintain bed alarm  - Apply yellow socks and bracelet for high fall risk patients  - Consider moving patient to room near nurses station  Outcome: Progressing

## 2022-11-23 NOTE — NURSING NOTE
Patient left unit via stretcher in stable condition with all belongings accompanied by transport team   AVS / summary of care given to transport for receiving facility and full report given to RN at receiving facility

## 2022-11-23 NOTE — DISCHARGE SUMMARY
Giannijustin 43  Discharge- Regis Izquierdo 1995, 32 y o  male MRN: [id number]  Unit/Bed#: [address] [address] Encounter: [id number]  Primary Care Provider: Miriam Lindsay DO   Date and time admitted to hospital: 11/20/2022 11:39 PM    * Abdominal pain  Assessment & Plan  Patient presents abdominal pain, still uncontrolled intervals doses of pain medicine - complex PMH including IBS status post colectomy with J-pouch formation, proctitis, recent surgery 10/18 for diverting ileostomy complicated by prolapse and localized intra-abdominal infection  Plan for reconstruction of pouch in April at [facility]  CT a/p 11/18 : There is no evidence of  bowel obstruction  Contrast is seen extending into the ileostomy bag through the right lower quadrant ostomy stoma  • GI recs:Again noted is fluid-filled J-pouch with some mucosal enhancement, there is no opacification of the J-pouch  No longer on Flagyl, prednisone, and per patient his surgeon stated no indication for biologic therapy as he felt there was no evidence of Crohn's  Recommended to touch base with [facility] surgeon since he has had recurrent postoperative pain  CRP is 12 7 which is around where it was in September but was markedly elevated in August at 180 7  Surgical pathology was reviewed from proximal small-bowel resection which showed - Small intestine with active chronic enteritis, transmural defect(measuring 5cm) and with marked active serositis  Negative for dysplasia, negative for granuloma  Margin closer to perforation is viable and shows active chronic enteritis  Other margin is viable and shows acute serositis  Immunohistochemical stain for CMV is positive in rare cells  Ordered fecal calprotectin as well which is pending, stool C diff and enteric panel negative, can use Imodium p r n  • Patient contacted surgeon at [facility] and was waiting a call back    Reports that he reach out to ostomy nurse and she is going to discuss with surgical team about possible transfer, patient feels that ostomy is retracting is having leakage around ostomy appliance which is irritating the skin  Wound nurse was going to see him today here but patient is trying to facilitate transfer back to [facility]  Chronic pain syndrome  Assessment & Plan  Monitor pain meds to avoid OIH   pt on opoid med complications and tolerance  Refer to OP Chronic pain management  - reviewed    Anemia  Assessment & Plan  History of thalassemia minor, hemoglobin downtrended today morning likely dilutional  Blood in ileostomy  Does not appear to have any active bleeding  · Trend hemoglobin  · Iron studies, B12, folate    Ankylosing spondylitis (HCC)  Assessment & Plan  Not on biologic therapy    CAR (generalized anxiety disorder)  Assessment & Plan  Continue Cymbalta 60 mg daily        Medical Problems     Resolved Problems  Date Reviewed: 11/22/2022   None       Discharging Physician / Practitioner: Jai Dempsey MD  PCP: Nadia Oglesby DO  Admission Date:   Admission Orders (From admission, onward)     Ordered        11/21/22 0249  Place in Observation  Once                      Discharge Date: 11/22/22    Consultations During Hospital Stay:  · GI, gen surg    Procedures Performed:   · N/A    Significant Findings / Test Results:   N/A    Incidental Findings:   N/A  ·     Test Results Pending at Discharge (will require follow up):   N/A     Outpatient Tests Requested:  · CBC, CMP, vitamin levels    Complications:  n/a    Reason for Admission: abdominal pain    Hospital Course:   Kei Shelton is a 32 y o  male patient who originally presented to the hospital on 11/20/2022 due to abdominal pain  Pt has history of Ankylosing spondylitis, IBS s/p colectomy with J-pouch, pouchitis  Pt had 10/18/22 surgery for diverting ileostomy and has residiual abdominal pain; which was complicated with a prolapse, peritonitis with ex- laparotomy procedure  Pt had cardiac arrest, followed by ICU admission  Pt reported a plan for reconstruction of his J-pouch in April and does not want adhesions  Pt had ED 2 days ago d/t abdominal pain and skin irritation around stoma  Contacted Colorectal specialist at [facility] for transfer to higher level of care for and evaluation and treatment  Pts family shared decision making  The patient, initially admitted to the hospital as inpatient, was discharged earlier than expected given the following: transfer to [facility]  Please see above list of diagnoses and related plan for additional information  Condition at Discharge: stable    Discharge Day Visit / Exam:   Subjective:  Patient seen and examined in bed, patient reported abdominal pain and discomfort around his colostomy bag, patient reported he feels like his colostomy bag was leaking and he should his at home 2-3 times and feels like it has not been like this to now  Patient reported that he feels like ostomy bag will fall when he walks around and reported to nurse that ostomy was retracting and he wanted ostomy nurse for evaluation  Patient asked about if he spoke with primary specialists at [facility]  Patient also requested more pain medication to his nurse  Vitals: Blood Pressure: 124/86 (11/22/22 1939)  Pulse: 95 (11/22/22 1939)  Temperature: 98 3 °F (36 8 °C) (11/22/22 1939)  Temp Source: Oral (11/22/22 0753)  Respirations: 16 (11/22/22 1939)  Height: 5' 9" (175 3 cm) (11/21/22 0555)  Weight - Scale: 74 8 kg (165 lb) (11/20/22 2340)  SpO2: 95 % (11/22/22 1939)  Exam:   Physical Exam  Vitals reviewed  Constitutional:       General: He is not in acute distress  Appearance: Normal appearance  HENT:      Head: Atraumatic  Nose: No congestion  Eyes:      General: No scleral icterus  Pupils: Pupils are equal, round, and reactive to light  Cardiovascular:      Rate and Rhythm: Normal rate  Heart sounds: No murmur heard  Pulmonary:      Effort: No respiratory distress        Breath sounds: No wheezing, rhonchi or rales  Abdominal:      General: There is no distension  Palpations: Abdomen is soft  Tenderness: There is abdominal tenderness  There is no guarding  Comments: Diffuse abdominal erythema, mell-colostomy,    Musculoskeletal:         General: No swelling or deformity  Normal range of motion  Cervical back: No tenderness  Right lower leg: No edema  Left lower leg: No edema  Feet:      Right foot:      Skin integrity: No callus  Skin:     General: Skin is warm  Findings: No lesion or rash  Neurological:      Mental Status: He is oriented to person, place, and time  Cranial Nerves: No cranial nerve deficit  Coordination: Coordination normal    Psychiatric:         Mood and Affect: Mood normal          Thought Content: Thought content normal      Discussion with Family: Updated  (mother) via phone  Discharge instructions/Information to patient and family:   See after visit summary for information provided to patient and family  Provisions for Follow-Up Care:  See after visit summary for information related to follow-up care and any pertinent home health orders  Disposition:   Other: [facility]    Planned Readmission: NO     Discharge Statement:  I spent 45 minutes discharging the patient  This time was spent on the day of discharge  I had direct contact with the patient on the day of discharge  Greater than 50% of the total time was spent examining patient, answering all patient questions, arranging and discussing plan of care with patient as well as directly providing post-discharge instructions  Additional time then spent on discharge activities  Discharge Medications:  See after visit summary for reconciled discharge medications provided to patient and/or family        **Please Note: This note may have been constructed using a voice recognition system**

## 2022-11-25 LAB — CALPROTECTIN STL-MCNT: <16 UG/G (ref 0–120)

## 2023-01-18 ENCOUNTER — APPOINTMENT (EMERGENCY)
Dept: RADIOLOGY | Facility: HOSPITAL | Age: 28
End: 2023-01-18

## 2023-01-18 ENCOUNTER — APPOINTMENT (OUTPATIENT)
Dept: RADIOLOGY | Facility: HOSPITAL | Age: 28
End: 2023-01-18

## 2023-01-18 ENCOUNTER — HOSPITAL ENCOUNTER (INPATIENT)
Facility: HOSPITAL | Age: 28
LOS: 5 days | Discharge: HOME | End: 2023-01-24
Attending: EMERGENCY MEDICINE | Admitting: STUDENT IN AN ORGANIZED HEALTH CARE EDUCATION/TRAINING PROGRAM

## 2023-01-18 DIAGNOSIS — R10.9 ABDOMINAL PAIN: ICD-10-CM

## 2023-01-18 DIAGNOSIS — R11.0 NAUSEA: ICD-10-CM

## 2023-01-18 DIAGNOSIS — Z93.2 ILEOSTOMY PRESENT (HCC): ICD-10-CM

## 2023-01-18 DIAGNOSIS — R19.7 DIARRHEA: ICD-10-CM

## 2023-01-18 DIAGNOSIS — G89.4 CHRONIC PAIN SYNDROME: ICD-10-CM

## 2023-01-18 DIAGNOSIS — D64.9 ANEMIA: ICD-10-CM

## 2023-01-18 DIAGNOSIS — R10.9 INTRACTABLE ABDOMINAL PAIN: ICD-10-CM

## 2023-01-18 DIAGNOSIS — R11.10 VOMITING: ICD-10-CM

## 2023-01-18 DIAGNOSIS — R10.13 EPIGASTRIC ABDOMINAL PAIN: Primary | ICD-10-CM

## 2023-01-18 PROBLEM — E87.8 ELECTROLYTE ABNORMALITY: Status: ACTIVE | Noted: 2023-01-18

## 2023-01-18 PROBLEM — R11.2 NAUSEA VOMITING AND DIARRHEA: Status: ACTIVE | Noted: 2023-01-18

## 2023-01-18 LAB
ALBUMIN SERPL BCP-MCNC: 4.6 G/DL (ref 3.5–5)
ALP SERPL-CCNC: 133 U/L (ref 46–116)
ALT SERPL W P-5'-P-CCNC: 60 U/L (ref 12–78)
ANION GAP SERPL CALCULATED.3IONS-SCNC: 11 MMOL/L (ref 4–13)
AST SERPL W P-5'-P-CCNC: 23 U/L (ref 5–45)
BACTERIA UR QL AUTO: NORMAL /HPF
BASOPHILS # BLD AUTO: 0.09 THOUSANDS/ÂΜL (ref 0–0.1)
BASOPHILS NFR BLD AUTO: 1 % (ref 0–1)
BILIRUB SERPL-MCNC: 1.1 MG/DL (ref 0.2–1)
BILIRUB UR QL STRIP: NEGATIVE
BUN SERPL-MCNC: 11 MG/DL (ref 5–25)
CALCIUM SERPL-MCNC: 9.6 MG/DL (ref 8.3–10.1)
CHLORIDE SERPL-SCNC: 95 MMOL/L (ref 96–108)
CLARITY UR: CLEAR
CO2 SERPL-SCNC: 25 MMOL/L (ref 21–32)
COLOR UR: YELLOW
CREAT SERPL-MCNC: 1.26 MG/DL (ref 0.6–1.3)
CRP SERPL QL: 43.5 MG/L
EOSINOPHIL # BLD AUTO: 0.08 THOUSAND/ÂΜL (ref 0–0.61)
EOSINOPHIL NFR BLD AUTO: 1 % (ref 0–6)
ERYTHROCYTE [DISTWIDTH] IN BLOOD BY AUTOMATED COUNT: 18.2 % (ref 11.6–15.1)
FLUAV RNA RESP QL NAA+PROBE: NEGATIVE
FLUBV RNA RESP QL NAA+PROBE: NEGATIVE
GFR SERPL CREATININE-BSD FRML MDRD: 76 ML/MIN/1.73SQ M
GLUCOSE SERPL-MCNC: 100 MG/DL (ref 65–140)
GLUCOSE UR STRIP-MCNC: NEGATIVE MG/DL
HCT VFR BLD AUTO: 42.9 % (ref 36.5–49.3)
HEMOCCULT STL QL: ABNORMAL
HEMOCCULT STL QL: ABNORMAL
HEMOCCULT STL QL: POSITIVE
HGB BLD-MCNC: 13 G/DL (ref 12–17)
HGB UR QL STRIP.AUTO: ABNORMAL
IMM GRANULOCYTES # BLD AUTO: 0.07 THOUSAND/UL (ref 0–0.2)
IMM GRANULOCYTES NFR BLD AUTO: 1 % (ref 0–2)
KETONES UR STRIP-MCNC: NEGATIVE MG/DL
LACTATE SERPL-SCNC: 1.2 MMOL/L (ref 0.5–2)
LEUKOCYTE ESTERASE UR QL STRIP: NEGATIVE
LIPASE SERPL-CCNC: 74 U/L (ref 73–393)
LYMPHOCYTES # BLD AUTO: 0.63 THOUSANDS/ÂΜL (ref 0.6–4.47)
LYMPHOCYTES NFR BLD AUTO: 5 % (ref 14–44)
MAGNESIUM SERPL-MCNC: 1.6 MG/DL (ref 1.6–2.6)
MCH RBC QN AUTO: 18.8 PG (ref 26.8–34.3)
MCHC RBC AUTO-ENTMCNC: 30.3 G/DL (ref 31.4–37.4)
MCV RBC AUTO: 62 FL (ref 82–98)
MONOCYTES # BLD AUTO: 0.78 THOUSAND/ÂΜL (ref 0.17–1.22)
MONOCYTES NFR BLD AUTO: 6 % (ref 4–12)
NEUTROPHILS # BLD AUTO: 11.38 THOUSANDS/ÂΜL (ref 1.85–7.62)
NEUTS SEG NFR BLD AUTO: 86 % (ref 43–75)
NITRITE UR QL STRIP: NEGATIVE
NON-SQ EPI CELLS URNS QL MICRO: NORMAL /HPF
NRBC BLD AUTO-RTO: 0 /100 WBCS
PH UR STRIP.AUTO: 6 [PH]
PLATELET # BLD AUTO: 399 THOUSANDS/UL (ref 149–390)
PMV BLD AUTO: 8.6 FL (ref 8.9–12.7)
POTASSIUM SERPL-SCNC: 3.5 MMOL/L (ref 3.5–5.3)
PROT SERPL-MCNC: 9.1 G/DL (ref 6.4–8.4)
PROT UR STRIP-MCNC: NEGATIVE MG/DL
RBC # BLD AUTO: 6.9 MILLION/UL (ref 3.88–5.62)
RBC #/AREA URNS AUTO: NORMAL /HPF
RSV RNA RESP QL NAA+PROBE: NEGATIVE
SARS-COV-2 RNA RESP QL NAA+PROBE: NEGATIVE
SODIUM SERPL-SCNC: 131 MMOL/L (ref 135–147)
SP GR UR STRIP.AUTO: 1.02 (ref 1–1.03)
TSH SERPL DL<=0.05 MIU/L-ACNC: 1.51 UIU/ML (ref 0.45–4.5)
UROBILINOGEN UR QL STRIP.AUTO: 0.2 E.U./DL
WBC # BLD AUTO: 13.03 THOUSAND/UL (ref 4.31–10.16)
WBC #/AREA URNS AUTO: NORMAL /HPF

## 2023-01-18 RX ORDER — LABETALOL HYDROCHLORIDE 5 MG/ML
10 INJECTION, SOLUTION INTRAVENOUS EVERY 6 HOURS PRN
Status: DISCONTINUED | OUTPATIENT
Start: 2023-01-18 | End: 2023-01-24 | Stop reason: HOSPADM

## 2023-01-18 RX ORDER — ONDANSETRON 2 MG/ML
4 INJECTION INTRAMUSCULAR; INTRAVENOUS ONCE
Status: COMPLETED | OUTPATIENT
Start: 2023-01-18 | End: 2023-01-18

## 2023-01-18 RX ORDER — PANTOPRAZOLE SODIUM 40 MG/1
40 TABLET, DELAYED RELEASE ORAL DAILY
Status: DISCONTINUED | OUTPATIENT
Start: 2023-01-18 | End: 2023-01-18

## 2023-01-18 RX ORDER — HYDROMORPHONE HCL/PF 1 MG/ML
1 SYRINGE (ML) INJECTION EVERY 4 HOURS PRN
Status: DISCONTINUED | OUTPATIENT
Start: 2023-01-18 | End: 2023-01-24 | Stop reason: HOSPADM

## 2023-01-18 RX ORDER — ONDANSETRON 2 MG/ML
4 INJECTION INTRAMUSCULAR; INTRAVENOUS EVERY 6 HOURS PRN
Status: DISCONTINUED | OUTPATIENT
Start: 2023-01-18 | End: 2023-01-21

## 2023-01-18 RX ORDER — HYDROMORPHONE HCL/PF 1 MG/ML
1 SYRINGE (ML) INJECTION ONCE
Status: COMPLETED | OUTPATIENT
Start: 2023-01-18 | End: 2023-01-18

## 2023-01-18 RX ORDER — ENOXAPARIN SODIUM 100 MG/ML
40 INJECTION SUBCUTANEOUS DAILY
Status: DISCONTINUED | OUTPATIENT
Start: 2023-01-18 | End: 2023-01-24 | Stop reason: HOSPADM

## 2023-01-18 RX ORDER — LEVOFLOXACIN 5 MG/ML
750 INJECTION, SOLUTION INTRAVENOUS EVERY 24 HOURS
Status: COMPLETED | OUTPATIENT
Start: 2023-01-18 | End: 2023-01-18

## 2023-01-18 RX ORDER — DULOXETIN HYDROCHLORIDE 60 MG/1
60 CAPSULE, DELAYED RELEASE ORAL DAILY
Status: DISCONTINUED | OUTPATIENT
Start: 2023-01-19 | End: 2023-01-24 | Stop reason: HOSPADM

## 2023-01-18 RX ORDER — METRONIDAZOLE 500 MG/100ML
500 INJECTION, SOLUTION INTRAVENOUS ONCE
Status: COMPLETED | OUTPATIENT
Start: 2023-01-18 | End: 2023-01-18

## 2023-01-18 RX ORDER — ONDANSETRON 4 MG/1
4 TABLET, ORALLY DISINTEGRATING ORAL EVERY 6 HOURS PRN
Qty: 9 TABLET | Refills: 0 | Status: ON HOLD | OUTPATIENT
Start: 2023-01-18 | End: 2023-02-07

## 2023-01-18 RX ORDER — PANTOPRAZOLE SODIUM 40 MG/10ML
40 INJECTION, POWDER, LYOPHILIZED, FOR SOLUTION INTRAVENOUS
Status: DISCONTINUED | OUTPATIENT
Start: 2023-01-18 | End: 2023-01-24 | Stop reason: HOSPADM

## 2023-01-18 RX ORDER — LORAZEPAM 2 MG/ML
0.5 INJECTION INTRAMUSCULAR EVERY 6 HOURS PRN
Status: DISCONTINUED | OUTPATIENT
Start: 2023-01-18 | End: 2023-01-24 | Stop reason: HOSPADM

## 2023-01-18 RX ORDER — SODIUM CHLORIDE 9 MG/ML
125 INJECTION, SOLUTION INTRAVENOUS CONTINUOUS
Status: DISCONTINUED | OUTPATIENT
Start: 2023-01-18 | End: 2023-01-24 | Stop reason: HOSPADM

## 2023-01-18 RX ADMIN — METRONIDAZOLE 500 MG: 500 INJECTION, SOLUTION INTRAVENOUS at 16:25

## 2023-01-18 RX ADMIN — ONDANSETRON 4 MG: 2 INJECTION INTRAMUSCULAR; INTRAVENOUS at 21:25

## 2023-01-18 RX ADMIN — HYDROMORPHONE HYDROCHLORIDE 1 MG: 1 INJECTION, SOLUTION INTRAMUSCULAR; INTRAVENOUS; SUBCUTANEOUS at 11:54

## 2023-01-18 RX ADMIN — HYDROMORPHONE HYDROCHLORIDE 1 MG: 1 INJECTION, SOLUTION INTRAMUSCULAR; INTRAVENOUS; SUBCUTANEOUS at 09:05

## 2023-01-18 RX ADMIN — ONDANSETRON 4 MG: 2 INJECTION INTRAMUSCULAR; INTRAVENOUS at 09:05

## 2023-01-18 RX ADMIN — ENOXAPARIN SODIUM 40 MG: 40 INJECTION SUBCUTANEOUS at 16:26

## 2023-01-18 RX ADMIN — IOHEXOL 100 ML: 350 INJECTION, SOLUTION INTRAVENOUS at 11:29

## 2023-01-18 RX ADMIN — HYDROMORPHONE HYDROCHLORIDE 1 MG: 1 INJECTION, SOLUTION INTRAMUSCULAR; INTRAVENOUS; SUBCUTANEOUS at 13:03

## 2023-01-18 RX ADMIN — SODIUM CHLORIDE 1000 ML: 0.9 INJECTION, SOLUTION INTRAVENOUS at 09:02

## 2023-01-18 RX ADMIN — ONDANSETRON 4 MG: 2 INJECTION INTRAMUSCULAR; INTRAVENOUS at 16:32

## 2023-01-18 RX ADMIN — SODIUM CHLORIDE 125 ML/HR: 0.9 INJECTION, SOLUTION INTRAVENOUS at 16:32

## 2023-01-18 RX ADMIN — SODIUM CHLORIDE 1000 ML: 0.9 INJECTION, SOLUTION INTRAVENOUS at 12:03

## 2023-01-18 RX ADMIN — LEVOFLOXACIN 750 MG: 750 INJECTION, SOLUTION INTRAVENOUS at 16:25

## 2023-01-18 RX ADMIN — HYDROMORPHONE HYDROCHLORIDE 1 MG: 1 INJECTION, SOLUTION INTRAMUSCULAR; INTRAVENOUS; SUBCUTANEOUS at 16:20

## 2023-01-18 RX ADMIN — ONDANSETRON 4 MG: 2 INJECTION INTRAMUSCULAR; INTRAVENOUS at 10:13

## 2023-01-18 RX ADMIN — SODIUM CHLORIDE 1000 ML: 0.9 INJECTION, SOLUTION INTRAVENOUS at 10:10

## 2023-01-18 RX ADMIN — ONDANSETRON 4 MG: 2 INJECTION INTRAMUSCULAR; INTRAVENOUS at 13:02

## 2023-01-18 RX ADMIN — PANTOPRAZOLE SODIUM 40 MG: 40 INJECTION, POWDER, FOR SOLUTION INTRAVENOUS at 16:25

## 2023-01-18 RX ADMIN — HYDROMORPHONE HYDROCHLORIDE 1 MG: 1 INJECTION, SOLUTION INTRAMUSCULAR; INTRAVENOUS; SUBCUTANEOUS at 21:25

## 2023-01-18 RX ADMIN — HYDROMORPHONE HYDROCHLORIDE 1 MG: 1 INJECTION, SOLUTION INTRAMUSCULAR; INTRAVENOUS; SUBCUTANEOUS at 10:11

## 2023-01-18 NOTE — ASSESSMENT & PLAN NOTE
Managed by [facility]    Patient reports changing ostomy bag every 3 to 4 days    · Revised 3 months ago, not nonproblematic

## 2023-01-18 NOTE — ASSESSMENT & PLAN NOTE
Recommend outpatient chronic pain management, very high tolerance and need for high pain medication    · Currently on Dilaudid   · patient refused Toradol

## 2023-01-18 NOTE — ASSESSMENT & PLAN NOTE
Chronic,     · CRP-43 5  · Likely multifactorial from infection versus, underlying autoimmune condition

## 2023-01-18 NOTE — ASSESSMENT & PLAN NOTE
Recurrent, unresolved, patient followed by [facility] specialist in [facility]    · CT abdomen:No acute findings in the abdomen or pelvis  Right lower quadrant ileostomy  Reidentified fluid-filled J-pouch with persistent 3mild mucosal hyperemia    · GI consulted, appreciate recs  · Pain management

## 2023-01-18 NOTE — ED PROVIDER NOTES
History  Chief Complaint   Patient presents with   • Abdominal Pain     Pt c/o nausea and pain since 1730 1/17/23     32year-old male presents with diffuse abdominal pain nausea and vomiting for the past day  History of significant abdominal surgical history with ileal pouchitis currently has an ileostomy  Drainage appropriate  Denies any history urgency frequency chest pain shortness of breath cough congestion or any other symptoms  No fevers or chills  Patient's pain currently is sharp continuous 10 out of 10 nothing makes it better or worse  History provided by:  Patient   used: No        Prior to Admission Medications   Prescriptions Last Dose Informant Patient Reported? Taking? DULoxetine (CYMBALTA) 60 mg delayed release capsule Not Taking  No No   Sig: Take 1 capsule (60 mg total) by mouth daily   Patient not taking: Reported on 1/18/2023   enoxaparin (LOVENOX) 40 mg/0 4 mL Not Taking  No No   Sig: Inject 0 4 mL (40 mg total) under the skin daily Do not start before November 23, 2022  Patient not taking: Reported on 1/18/2023   loperamide (IMODIUM) 2 mg capsule Not Taking  No No   Sig: Take 1 capsule (2 mg total) by mouth every 4 (four) hours as needed for diarrhea   Patient not taking: Reported on 1/18/2023   ondansetron (ZOFRAN) 4 mg/2 mL injection Not Taking  No No   Sig: Inject 2 mL (4 mg total) into a catheter in a vein every 6 (six) hours as needed for nausea or vomiting   Patient not taking: Reported on 1/18/2023   psyllium (METAMUCIL) packet Not Taking  No No   Sig: Take 1 packet by mouth daily Do not start before November 23, 2022     Patient not taking: Reported on 1/18/2023   sodium chloride Not Taking  No No   Sig: Inject 75 mL/hr into a catheter in a vein continuous   Patient not taking: Reported on 1/18/2023      Facility-Administered Medications: None       Past Medical History:   Diagnosis Date   • Ankylosing spondylitis ([address] )    • Anxiety    • Bowel obstruction ([facility] )    • Clostridium difficile colitis 9/13/2018   • Colitis    • 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[facility] ) 9/13/2018   • Pancreatitis    • Ulcerative colitis ([facility] )        Past Surgical History:   Procedure Laterality Date   • COLECTOMY TOTAL      with ileal pouch and anastemosis   • IR PICC PLACEMENT SINGLE LUMEN  3/1/2022   • TOTAL COLECTOMY         History reviewed  No pertinent family history  I have reviewed and agree with the history as documented  E-Cigarette/Vaping   • E-Cigarette Use Never User      E-Cigarette/Vaping Substances   • Nicotine No    • THC No    • CBD No    • Flavoring No    • Other No    • Unknown No      Social History     Tobacco Use   • Smoking status: Never   • Smokeless tobacco: Never   Vaping Use   • Vaping Use: Never used   Substance Use Topics   • Alcohol use: Not Currently     Comment: pt states 5 a month/socially   • Drug use: No       Review of Systems   Constitutional: Negative  HENT: Negative  Eyes: Negative  Respiratory: Negative  Cardiovascular: Negative  Gastrointestinal: Positive for abdominal pain, nausea and vomiting  Endocrine: Negative  Genitourinary: Negative  Musculoskeletal: Negative  Skin: Negative  Allergic/Immunologic: Negative  Neurological: Negative  Hematological: Negative  Psychiatric/Behavioral: Negative  All other systems reviewed and are negative  Physical Exam  Physical Exam  Constitutional:       Appearance: Normal appearance  HENT:      Head: Normocephalic and atraumatic  Nose: Nose normal       Mouth/Throat:      Mouth: Mucous membranes are moist    Eyes:      Extraocular Movements: Extraocular movements intact  Pupils: Pupils are equal, round, and reactive to light  Cardiovascular:      Rate and Rhythm: Normal rate and regular rhythm  Pulmonary:      Effort: Pulmonary effort is normal       Breath sounds: Normal breath sounds  Abdominal:      General: Abdomen is flat   Bowel sounds are normal  Palpations: Abdomen is soft  Tenderness: There is generalized abdominal tenderness  There is no right CVA tenderness, left CVA tenderness, guarding or rebound  Negative signs include Beatty's sign, Rovsing's sign, McBurney's sign, psoas sign and obturator sign  Musculoskeletal:         General: Normal range of motion  Cervical back: Normal range of motion and neck supple  Skin:     General: Skin is warm  Capillary Refill: Capillary refill takes less than 2 seconds  Neurological:      General: No focal deficit present  Mental Status: He is alert and oriented to person, place, and time  Mental status is at baseline  Psychiatric:         Mood and Affect: Mood normal          Thought Content:  Thought content normal          Vital Signs  ED Triage Vitals [01/18/23 0845]   Temperature Pulse Respirations Blood Pressure SpO2   99 6 °F (37 6 °C) (!) 132 20 138/88 97 %      Temp Source Heart Rate Source Patient Position - Orthostatic VS BP Location FiO2 (%)   Oral Monitor Lying Left arm --      Pain Score       10 - Worst Possible Pain           Vitals:    01/18/23 1300 01/18/23 1330 01/18/23 1400 01/18/23 1430   BP: 134/80 133/79 126/73 119/72   Pulse: (!) 152 (!) 118 (!) 113 (!) 113   Patient Position - Orthostatic VS:             Visual Acuity      ED Medications  Medications   ondansetron (ZOFRAN) injection 4 mg (4 mg Intravenous Given 1/18/23 0905)   HYDROmorphone (DILAUDID) injection 1 mg (1 mg Intravenous Given 1/18/23 0905)   sodium chloride 0 9 % bolus 1,000 mL (0 mL Intravenous Stopped 1/18/23 1013)   sodium chloride 0 9 % bolus 1,000 mL (0 mL Intravenous Stopped 1/18/23 1129)   barium (READI-CAT 2) suspension 900 mL (900 mL Oral Given 1/18/23 0922)   HYDROmorphone (DILAUDID) injection 1 mg (1 mg Intravenous Given 1/18/23 1011)   ondansetron (ZOFRAN) injection 4 mg (4 mg Intravenous Given 1/18/23 1013)   iohexol (OMNIPAQUE) 350 MG/ML injection (SINGLE-DOSE) 100 mL (100 mL Intravenous Given 1/18/23 1129)   HYDROmorphone (DILAUDID) injection 1 mg (1 mg Intravenous Given 1/18/23 1154)   sodium chloride 0 9 % bolus 1,000 mL (0 mL Intravenous Stopped 1/18/23 1340)   HYDROmorphone (DILAUDID) injection 1 mg (1 mg Intravenous Given 1/18/23 1303)   ondansetron (ZOFRAN) injection 4 mg (4 mg Intravenous Given 1/18/23 1302)       Diagnostic Studies  Results Reviewed     Procedure Component Value Units Date/Time    Urine Microscopic [[id number]]  (Normal) Collected: 01/18/23 1143    Lab Status: Final result Specimen: Urine, Clean Catch Updated: 01/18/23 1223     RBC, UA 0-1 /hpf      WBC, UA None Seen /hpf      Epithelial Cells None Seen /hpf      Bacteria, UA Occasional /hpf     UA (URINE) with reflex to Scope [[id number]]  (Abnormal) Collected: 01/18/23 1143    Lab Status: Final result Specimen: Urine, Clean Catch Updated: 01/18/23 1155     Color, UA Yellow     Clarity, UA Clear     Specific Gravity, UA 1 025     pH, UA 6 0     Leukocytes, UA Negative     Nitrite, UA Negative     Protein, UA Negative mg/dl      Glucose, UA Negative mg/dl      Ketones, UA Negative mg/dl      Urobilinogen, UA 0 2 E U /dl      Bilirubin, UA Negative     Occult Blood, UA Trace-Intact    Lactic acid [[id number]]  (Normal) Collected: 01/18/23 0859    Lab Status: Final result Specimen: Blood from Arm, Right Updated: 01/18/23 0931     LACTIC ACID 1 2 mmol/L     Narrative:      Result may be elevated if tourniquet was used during collection      Comprehensive metabolic panel [[id number]]  (Abnormal) Collected: 01/18/23 0859    Lab Status: Final result Specimen: Blood from Arm, Right Updated: 01/18/23 0926     Sodium 131 mmol/L      Potassium 3 5 mmol/L      Chloride 95 mmol/L      CO2 25 mmol/L      ANION GAP 11 mmol/L      BUN 11 mg/dL      Creatinine 1 26 mg/dL      Glucose 100 mg/dL      Calcium 9 6 mg/dL      AST 23 U/L      ALT 60 U/L      Alkaline Phosphatase 133 U/L      Total Protein 9 1 g/dL      Albumin 4 6 g/dL      Total Bilirubin 1 10 mg/dL      eGFR 76 ml/min/1 73sq m     Narrative:      [place] guidelines for Chronic Kidney Disease (CKD):   •  Stage 1 with normal or high GFR (GFR > 90 mL/min/1 73 square meters)  •  Stage 2 Mild CKD (GFR = 60-89 mL/min/1 73 square meters)  •  Stage 3A Moderate CKD (GFR = 45-59 mL/min/1 73 square meters)  •  Stage 3B Moderate CKD (GFR = 30-44 mL/min/1 73 square meters)  •  Stage 4 Severe CKD (GFR = 15-29 mL/min/1 73 square meters)  •  Stage 5 End Stage CKD (GFR <15 mL/min/1 73 square meters)  Note: GFR calculation is accurate only with a steady state creatinine    Lipase [[id number]]  (Normal) Collected: 01/18/23 0859    Lab Status: Final result Specimen: Blood from Arm, Right Updated: 01/18/23 0926     Lipase 74 u/L     Magnesium [[id number]]  (Normal) Collected: 01/18/23 0859    Lab Status: Final result Specimen: Blood from Arm, Right Updated: 01/18/23 0926     Magnesium 1 6 mg/dL     CBC and differential [[id number]]  (Abnormal) Collected: 01/18/23 0859    Lab Status: Final result Specimen: Blood from Arm, Right Updated: 01/18/23 0908     WBC 13 03 Thousand/uL      RBC 6 90 Million/uL      Hemoglobin 13 0 g/dL      Hematocrit 42 9 %      MCV 62 fL      MCH 18 8 pg      MCHC 30 3 g/dL      RDW 18 2 %      MPV 8 6 fL      Platelets 220 Thousands/uL      nRBC 0 /100 WBCs      Neutrophils Relative 86 %      Immat GRANS % 1 %      Lymphocytes Relative 5 %      Monocytes Relative 6 %      Eosinophils Relative 1 %      Basophils Relative 1 %      Neutrophils Absolute 11 38 Thousands/µL      Immature Grans Absolute 0 07 Thousand/uL      Lymphocytes Absolute 0 63 Thousands/µL      Monocytes Absolute 0 78 Thousand/µL      Eosinophils Absolute 0 08 Thousand/µL      Basophils Absolute 0 09 Thousands/µL     Blood culture #1 [[id number]] Collected: 01/18/23 0859    Lab Status:  In process Specimen: Blood from Arm, Right Updated: 01/18/23 0908    Blood culture #2 [[id number]] Collected: 01/18/23 0859    Lab Status: In process Specimen: Blood from Arm, Right Updated: 01/18/23 0908                 CT abdomen pelvis with contrast   Final Result by Maria Del Carmen Perez MD (01/18 5384)      No acute findings in the abdomen or pelvis  Right lower quadrant ileostomy  Reidentified fluid-filled J-pouch with persistent 3mild mucosal hyperemia  Workstation performed: [id number]                    Procedures  Procedures         ED Course                                             Medical Decision Making  Abdominal pain: acute illness or injury     Details: acute on chronic  Nausea: acute illness or injury  Vomiting: acute illness or injury  Amount and/or Complexity of Data Reviewed  Labs: ordered  Decision-making details documented in ED Course  Radiology: ordered and independent interpretation performed  Decision-making details documented in ED Course  Risk  Decision regarding hospitalization            Disposition  Final diagnoses:   Abdominal pain   Nausea   Vomiting     Time reflects when diagnosis was documented in both MDM as applicable and the Disposition within this note     Time User Action Codes Description Comment    1/18/2023 12:47 PM Anepu, Ramone Din Add [R10 9] Abdominal pain     1/18/2023 12:47 PM Anepu, Cassie Add [R11 0] Nausea     1/18/2023 12:47 PM Anepu, Ramone Din Add [R11 10] Vomiting       ED Disposition     ED Disposition   Admit    Condition   Stable    Date/Time   Wed Jan 18, 2023  1:58 PM    Comment               Follow-up Information     Follow up With Specialties Details Why Contact Info Additional Willian Best 13, DO Family Medicine Schedule an appointment as soon as possible for a visit   [address]  [address] [facility] Emergency Medicine  If symptoms worsen [address] [address] [address] [address] Emergency Department, [place], [place], [address]          Current Discharge Medication List      START taking these medications    Details   ondansetron (ZOFRAN-ODT) 4 mg disintegrating tablet Take 1 tablet (4 mg total) by mouth every 6 (six) hours as needed for nausea for up to 3 days  Qty: 9 tablet, Refills: 0    Associated Diagnoses: Nausea; Vomiting         CONTINUE these medications which have NOT CHANGED    Details   DULoxetine (CYMBALTA) 60 mg delayed release capsule Take 1 capsule (60 mg total) by mouth daily  Qty: 90 capsule, Refills: 3    Associated Diagnoses: CAR (generalized anxiety disorder)      enoxaparin (LOVENOX) 40 mg/0 4 mL Inject 0 4 mL (40 mg total) under the skin daily Do not start before November 23, 2022  Refills: 0    Associated Diagnoses: Abdominal pain      loperamide (IMODIUM) 2 mg capsule Take 1 capsule (2 mg total) by mouth every 4 (four) hours as needed for diarrhea  Qty: 30 capsule, Refills: 0    Associated Diagnoses: Abdominal pain      ondansetron (ZOFRAN) 4 mg/2 mL injection Inject 2 mL (4 mg total) into a catheter in a vein every 6 (six) hours as needed for nausea or vomiting  Refills: 0    Associated Diagnoses: Abdominal pain      psyllium (METAMUCIL) packet Take 1 packet by mouth daily Do not start before November 23, 2022  Refills: 0    Associated Diagnoses: Abdominal pain      sodium chloride Inject 75 mL/hr into a catheter in a vein continuous  Refills: 0    Associated Diagnoses: Abdominal pain             No discharge procedures on file      PDMP Review       Value Time User    PDMP Reviewed  Yes 11/22/2022  7:40 PM Esperanza Chairez MD          ED Provider  Electronically Signed by           Kateryna Beatty DO  01/18/23 3457

## 2023-01-18 NOTE — PLAN OF CARE
Problem: PAIN - ADULT  Goal: Verbalizes/displays adequate comfort level or baseline comfort level  Description: Interventions:  - Encourage patient to monitor pain and request assistance  - Assess pain using appropriate pain scale  - Administer analgesics based on type and severity of pain and evaluate response  - Implement non-pharmacological measures as appropriate and evaluate response  - Consider cultural and social influences on pain and pain management  - Notify physician/advanced practitioner if interventions unsuccessful or patient reports new pain  Outcome: Progressing     Problem: INFECTION - ADULT  Goal: Absence or prevention of progression during hospitalization  Description: INTERVENTIONS:  - Assess and monitor for signs and symptoms of infection  - Monitor lab/diagnostic results  - Monitor all insertion sites, i e  indwelling lines, tubes, and drains  - Monitor endotracheal if appropriate and nasal secretions for changes in amount and color  - [place] appropriate cooling/warming therapies per order  - Administer medications as ordered  - Instruct and encourage patient and family to use good hand hygiene technique  - Identify and instruct in appropriate isolation precautions for identified infection/condition  Outcome: Progressing  Goal: Absence of fever/infection during neutropenic period  Description: INTERVENTIONS:  - Monitor WBC    Outcome: Progressing     Problem: SAFETY ADULT  Goal: Patient will remain free of falls  Description: INTERVENTIONS:  - Educate patient/family on patient safety including physical limitations  - Instruct patient to call for assistance with activity   - Consult OT/PT to assist with strengthening/mobility   - Keep Call bell within reach  - Keep bed low and locked with side rails adjusted as appropriate  - Keep care items and personal belongings within reach  - Initiate and maintain comfort rounds  - Make Fall Risk Sign visible to staff  - Offer Toileting every 2 Hours, in advance of need  - Initiate/Maintain bed alarm  - Obtain necessary fall risk management equipment: alarms  - Apply yellow socks and bracelet for high fall risk patients  - Consider moving patient to room near nurses station  Outcome: Progressing  Goal: Maintain or return to baseline ADL function  Description: INTERVENTIONS:  -  Assess patient's ability to carry out ADLs; assess patient's baseline for ADL function and identify physical deficits which impact ability to perform ADLs (bathing, care of mouth/teeth, toileting, grooming, dressing, etc )  - Assess/evaluate cause of self-care deficits   - Assess range of motion  - Assess patient's mobility; develop plan if impaired  - Assess patient's need for assistive devices and provide as appropriate  - Encourage maximum independence but intervene and supervise when necessary  - Involve family in performance of ADLs  - Assess for home care needs following discharge   - Consider OT consult to assist with ADL evaluation and planning for discharge  - Provide patient education as appropriate  Outcome: Progressing  Goal: Maintains/Returns to pre admission functional level  Description: INTERVENTIONS:  - Perform BMAT or MOVE assessment daily    - Set and communicate daily mobility goal to care team and patient/family/caregiver  - Collaborate with rehabilitation services on mobility goals if consulted  - Perform Range of Motion 4 times a day  - Reposition patient every 2 hours    - Dangle patient 2 times a day  - Stand patient 2 times a day  - Ambulate patient 3 times a day  - Out of bed to chair 2 times a day   - Out of bed for meals 2 times a day  - Out of bed for toileting  - Record patient progress and toleration of activity level   Outcome: Progressing     Problem: DISCHARGE PLANNING  Goal: Discharge to home or other facility with appropriate resources  Description: INTERVENTIONS:  - Identify barriers to discharge w/patient and caregiver  - Arrange for needed discharge resources and transportation as appropriate  - Identify discharge learning needs (meds, wound care, etc )  - Arrange for interpretive services to assist at discharge as needed  - Refer to Case Management Department for coordinating discharge planning if the patient needs post-hospital services based on physician/advanced practitioner order or complex needs related to functional status, cognitive ability, or social support system  Outcome: Progressing     Problem: Knowledge Deficit  Goal: Patient/family/caregiver demonstrates understanding of disease process, treatment plan, medications, and discharge instructions  Description: Complete learning assessment and assess knowledge base    Interventions:  - Provide teaching at level of understanding  - Provide teaching via preferred learning methods  Outcome: Progressing

## 2023-01-18 NOTE — H&P
Stephanie 47 1995, 32 y o  male MRN: [id number]  Unit/Bed#: [address] [place] [address] Encounter: [id number]  Primary Care Provider: Rolo Iyer DO   Date and time admitted to hospital: 1/18/2023  8:36 AM    Abdominal pain  Assessment & Plan  Recurrent, unresolved, patient followed by [facility] specialist in [facility]    · CT abdomen:No acute findings in the abdomen or pelvis  Right lower quadrant ileostomy  Reidentified fluid-filled J-pouch with persistent 3mild mucosal hyperemia    · GI consulted, appreciate recs  · Pain management    Nausea vomiting and diarrhea  Assessment & Plan  PTA, unresolved    · IVF normal saline   · Stool cultures-pending  · Zofran, Immodium prn      Electrolyte abnormality  Assessment & Plan  Likely secondary to uncontrolled nausea vomiting, replenish sodium replenish sodium and chloride    · Replenish sodium and chloride Na/Cl  · IVF   · will trend      Chronic pain syndrome  Assessment & Plan  Recommend outpatient chronic pain management, very high tolerance and need for high pain medication    · Currently on Dilaudid   · patient refused Toradol        Ileostomy present [facility])  Assessment & Plan  Managed by [facility]    Patient reports changing ostomy bag every 3 to 4 days    · Revised 3 months ago, not nonproblematic    Elevated C-reactive protein (CRP)  Assessment & Plan  Chronic,     · CRP-43 5  · Likely multifactorial from infection versus, underlying autoimmune condition    Epigastric abdominal pain  Assessment & Plan  Recurrent,    · Lipase-WNL  · CT abdomen-negative  · Consider alternate causes for epigastric pain    SIRS (systemic inflammatory response syndrome) (HCC)  Assessment & Plan  Evident by leukocytosis, tachycardia    · CT abdomen negative for acute findings  · CXR-pending  · Possible GI source  · Blood cultures/stool cultures/UA-pending  · Lactic acid-WNL/Pro-Rubin-pending  · Levaquin and metronidazole empirically given  · Continue to monitor for signs of infecion    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,    · Followed by [facility], outpt  · GI consulted    CAR (generalized anxiety disorder)  Assessment & Plan  Chronic,     · Restarted home duloxetine  · As needed low-dose for anxiety          VTE Pharmacologic Prophylaxis:   Moderate Risk (Score 3-4) - Pharmacological DVT Prophylaxis Ordered: enoxaparin (Lovenox)  Code Status: Level 1 - Full Code   Discussion with family: Patient declined call to   Anticipated Length of Stay: Patient will be admitted on an inpatient basis with an anticipated length of stay of greater than 2 midnights secondary to Clinical course and GI recs  Total Time for Visit, including Counseling / Coordination of Care: 60 minutes Greater than 50% of this total time spent on direct patient counseling and coordination of care  Chief Complaint: Abdominal pain, nausea, vomiting, diarrhea    History of Present Illness:  Jai Vick is a 32 y o  male with a PMH of ileostomy, recurrent abdominal pain, history of UC who presents with intractable abdominal pain  Patient reported that he has not had any complications with his ileostomy bag for 90 days after visiting [address]  specialist   Patient reported that he currently has nausea, vomiting, diarrhea since 5 PM yesterday  Patient reported symptoms began to worsen and about 1 AM he had intense abdominal pain and got lethargic  Then on the way to work this morning he began intense vomiting and lightheadedness and was brought to ED by coworkers  Patient reported that he usually changes his ostomy bag every 3 to 4 days and has been changing every 30 minutes  Patient reported ostomy bag filled with light green stool  Patient denied any sick contacts recent travel, or change in diet    Patient reported pain 10 out of 10, slightly improved with Dilaudid, patient stated he did not like Toradol for pain and did not like Reglan  Review of Systems:  Review of Systems   Constitutional: Negative for chills and fever  HENT: Negative for ear pain and sore throat  Eyes: Negative for pain and visual disturbance  Respiratory: Negative for cough and shortness of breath  Cardiovascular: Negative for chest pain and palpitations  Gastrointestinal: Positive for abdominal pain, diarrhea, nausea and vomiting  Negative for blood in stool  Genitourinary: Negative for dysuria and hematuria  Musculoskeletal: Negative for arthralgias and back pain  Skin: Negative for color change and rash  Neurological: Negative for seizures, syncope and light-headedness  All other systems reviewed and are negative  Past Medical and Surgical History:   Past Medical History:   Diagnosis Date   • Ankylosing spondylitis ([facility] )    • Anxiety    • Bowel obstruction ([facility] )    • Clostridium difficile colitis 9/13/2018   • Colitis    • 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[facility] ) 9/13/2018   • Pancreatitis    • Ulcerative colitis ([facility] )        Past Surgical History:   Procedure Laterality Date   • COLECTOMY TOTAL      with ileal pouch and anastemosis   • IR PICC PLACEMENT SINGLE LUMEN  3/1/2022   • TOTAL COLECTOMY         Meds/Allergies:  Prior to Admission medications    Medication Sig Start Date End Date Taking? Authorizing Provider   ondansetron (ZOFRAN-ODT) 4 mg disintegrating tablet Take 1 tablet (4 mg total) by mouth every 6 (six) hours as needed for nausea for up to 3 days 1/18/23 1/21/23 Yes Cassie King DO   DULoxetine (CYMBALTA) 60 mg delayed release capsule Take 1 capsule (60 mg total) by mouth daily  Patient not taking: Reported on 1/18/2023 6/10/22   Tiffani Harris DO   enoxaparin (LOVENOX) 40 mg/0 4 mL Inject 0 4 mL (40 mg total) under the skin daily Do not start before November 23, 2022    Patient not taking: Reported on 1/18/2023 11/23/22   [place] Reyez   loperamide (IMODIUM) 2 mg capsule Take 1 capsule (2 mg total) by mouth every 4 (four) hours as needed for diarrhea  Patient not taking: Reported on 1/18/2023 11/22/22   [place] Reyez   ondansetron (ZOFRAN) 4 mg/2 mL injection Inject 2 mL (4 mg total) into a catheter in a vein every 6 (six) hours as needed for nausea or vomiting  Patient not taking: Reported on 1/18/2023 11/22/22   [place] Reyez   psyllium (METAMUCIL) packet Take 1 packet by mouth daily Do not start before November 23, 2022  Patient not taking: Reported on 1/18/2023 11/23/22   [place] Reyez   sodium chloride Inject 75 mL/hr into a catheter in a vein continuous  Patient not taking: Reported on 1/18/2023 11/22/22   [place] Noguera     I have reviewed home medications using recent Epic encounter  Allergies: Allergies   Allergen Reactions   • Cefazolin Hives     Other reaction(s): Arrythmia (Abnormal Heartbeat), Rash Maculopapular   • Mesalamine GI Intolerance     Other reaction(s): Pancreatitis  Annotation - [id number]: pancreatitis   • Wound Dressings Rash     Col[employer] ostomy Products        Social History:  Marital Status: Single   Occupation: Law Enforcement  Patient Pre-hospital Living Situation: Apartment  Patient Pre-hospital Level of Mobility: walks  Patient Pre-hospital Diet Restrictions:   Substance Use History:   Social History     Substance and Sexual Activity   Alcohol Use Not Currently    Comment: pt states 5 a month/socially     Social History     Tobacco Use   Smoking Status Never   Smokeless Tobacco Never     Social History     Substance and Sexual Activity   Drug Use No       Family History:  History reviewed  No pertinent family history      Physical Exam:     Vitals:   Blood Pressure: 129/88 (01/18/23 1502)  Pulse: (!) 121 (01/18/23 1502)  Temperature: 98 9 °F (37 2 °C) (01/18/23 1502)  Temp Source: Oral (01/18/23 1430)  Respirations: 16 (01/18/23 1430)  Height: 5' 9" (175 3 cm) (01/18/23 0845)  Weight - Scale: 82 kg (180 lb 12 4 oz) (01/18/23 0845)  SpO2: 96 % (01/18/23 1502)    Physical Exam  Vitals and nursing note reviewed  Constitutional:       General: He is not in acute distress  Appearance: He is well-developed  HENT:      Head: Normocephalic and atraumatic  Eyes:      Conjunctiva/sclera: Conjunctivae normal    Cardiovascular:      Rate and Rhythm: Normal rate and regular rhythm  Heart sounds: No murmur heard  Pulmonary:      Effort: Pulmonary effort is normal  No respiratory distress  Breath sounds: Normal breath sounds  No wheezing or rales  Abdominal:      General: Bowel sounds are normal  There is no distension  Palpations: Abdomen is soft  There is no mass  Tenderness: There is abdominal tenderness  There is no guarding or rebound  Comments: right periumbilical ostomy bag   Musculoskeletal:         General: No swelling  Cervical back: Neck supple  Skin:     General: Skin is warm and dry  Capillary Refill: Capillary refill takes less than 2 seconds  Neurological:      Mental Status: He is alert     Psychiatric:         Mood and Affect: Mood normal           Additional Data:     Lab Results:  Results from last 7 days   Lab Units 01/18/23  0859   WBC Thousand/uL 13 03*   HEMOGLOBIN g/dL 13 0   HEMATOCRIT % 42 9   PLATELETS Thousands/uL 399*   NEUTROS PCT % 86*   LYMPHS PCT % 5*   MONOS PCT % 6   EOS PCT % 1     Results from last 7 days   Lab Units 01/18/23  0859   SODIUM mmol/L 131*   POTASSIUM mmol/L 3 5   CHLORIDE mmol/L 95*   CO2 mmol/L 25   BUN mg/dL 11   CREATININE mg/dL 1 26   ANION GAP mmol/L 11   CALCIUM mg/dL 9 6   ALBUMIN g/dL 4 6   TOTAL BILIRUBIN mg/dL 1 10*   ALK PHOS U/L 133*   ALT U/L 60   AST U/L 23   GLUCOSE RANDOM mg/dL 100                 Results from last 7 days   Lab Units 01/18/23  0859   LACTIC ACID mmol/L 1 2       Lines/Drains:  Invasive Devices     Peripheral Intravenous Line  Duration           Peripheral IV 01/18/23 Right Antecubital <1 day          Drain  Duration           Ileostomy Continent (Abdominal pouch) RLQ 58 days Imaging: Reviewed radiology reports from this admission including: abdominal/pelvic CT  CT abdomen pelvis with contrast   Final Result by Chace Avendaño MD (01/18 9076)      No acute findings in the abdomen or pelvis  Right lower quadrant ileostomy  Reidentified fluid-filled J-pouch with persistent 3mild mucosal hyperemia  Workstation performed: [id number]         XR chest portable    (Results Pending)       EKG and Other Studies Reviewed on Admission:   · EKG: No EKG obtained  Tachycardia    ** Please Note: This note has been constructed using a voice recognition system   **

## 2023-01-18 NOTE — CONSULTS
Consultation -[facility]   Beto Maradiaga 32 y o  male MRN: [id number]    Unit/Bed#: [address] [facility] Encounter: [id number]      Physician Requesting Consult: Dr Ella Min     Reason for Consult / Principal Problem: abd pain     HPI: This is a 78-year-old male who presents with abdominal pain  Has had multiple admissions with history of IBD suspected ulcerative colitis with J-pouch with recurrent pouchitis  There was also suspicion of Crohn's in the past but his surgeon strongly felt that he does not have Crohn's disease  Pt with a PMH of ankylosing spondylitis as well  Patient underwent multiple  surgeries at [facility] in Oct 2022  Pt is s/p lap ileostomy creation 10/13/22, course c/b stoma proximal limb evisceration on 10/18, s/p revision, had postop pain, tachycardia, fever, s/p ex lap, washout, end ileostomy creation, with purulent ascites   Plan was for reconstruction of his pouch in April, surgeon would like to wait until then to prevent further adhesions  Patient reports that he started having significant diarrhea which began last night and was feeling feverish and chills  He denies any sick contacts or recent antibiotic use  He states that he has been having to empty his ileostomy bag every 30 minutes and has significant liquid green stool output  He was feeling dizzy and lightheaded  After last admission he went to [facility] and they put a stent in his ileostomy area that helped drain but this was only in for a few days  They thought that maybe he was intermittently obstructing and then having a large amount of output at 1 time  States he was doing well since around Thanksgiving until this started yesterday  Reports some sore throat and some coughing  Allergies:    Allergies   Allergen Reactions   • Cefazolin Hives     Other reaction(s): Arrythmia (Abnormal Heartbeat), Rash Maculopapular   • Mesalamine GI Intolerance     Other reaction(s): Pancreatitis  Annotation - [id number]: pancreatitis   • Wound Dressings Rash     [employer] ostomy Products        Medications:  Current Facility-Administered Medications:   •  enoxaparin (LOVENOX) subcutaneous injection 40 mg, 40 mg, Subcutaneous, Daily, Homero Angel MD  •  HYDROmorphone (DILAUDID) injection 1 mg, 1 mg, Intravenous, Q4H PRN, Homero Angel MD  •  labetalol (NORMODYNE) injection 10 mg, 10 mg, Intravenous, Q6H PRN, Homero Angel MD  •  levofloxacin (LEVAQUIN) IVPB (premix in dextrose) 750 mg 150 mL, 750 mg, Intravenous, Q24H **AND** metroNIDAZOLE (FLAGYL) IVPB (premix) 500 mg 100 mL, 500 mg, Intravenous, Once, Homero Angel MD  •  LORazepam (ATIVAN) injection 0 5 mg, 0 5 mg, Intravenous, Q6H PRN, Homero Angel MD  •  [facility] injection 4 mg, 4 mg, Intravenous, Q6H PRN, Homero Angel MD  •  pantoprazole (PROTONIX) injection 40 mg, 40 mg, Intravenous, Q24H [facility], Homero Angel MD  •  sodium chloride 0 9 % infusion, 125 mL/hr, Intravenous, Continuous, Homero Angel MD    Past Medical history:  Past Medical History:   Diagnosis Date   • Ankylosing spondylitis ([facility] )    • Anxiety    • Bowel obstruction ([facility] )    • Clostridium difficile colitis 9/13/2018   • Colitis    • 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[facility] ) 9/13/2018   • Pancreatitis    • Ulcerative colitis ([facility] )        Past Surgical History:   Past Surgical History:   Procedure Laterality Date   • COLECTOMY TOTAL      with ileal pouch and anastemosis   • IR PICC PLACEMENT SINGLE LUMEN  3/1/2022   • TOTAL COLECTOMY         Family History: History reviewed  No pertinent family history      Social history:   Social History     Socioeconomic History   • Marital status: Single     Spouse name: Not on file   • Number of children: Not on file   • Years of education: Not on file   • Highest education level: Not on file   Occupational History   • Not on file   Tobacco Use   • Smoking status: Never   • Smokeless tobacco: Never   Vaping Use   • Vaping Use: Never used   Substance and Sexual Activity   • Alcohol use: Not Currently     Comment: pt states 5 a month/socially   • Drug use: No   • Sexual activity: Not on file   Other Topics Concern   • Not on file   Social History Narrative   • Not on file     Social Determinants of Health     Financial Resource Strain: Not on file   Food Insecurity: No Food Insecurity   • Worried About Running Out of Food in the Last Year: Never true   • Ran Out of Food in the Last Year: Never true   Transportation Needs: No Transportation Needs   • Lack of Transportation (Medical): No   • Lack of Transportation (Non-Medical):  No   Physical Activity: Not on file   Stress: Not on file   Social Connections: Not on file   Intimate Partner Violence: Not on file   Housing Stability: Low Risk    • Unable to Pay for Housing in the Last Year: No   • Number of Places Lived in the Last Year: 1   • Unstable Housing in the Last Year: No       Review of Systems: All other systems were reviewed and were negative, otherwise please refer to HPI    Physical Exam: /88   Pulse (!) 121   Temp 98 9 °F (37 2 °C)   Resp 16   Ht 5' 9" (1 753 m)   Wt 82 kg (180 lb 12 4 oz)   SpO2 96%   BMI 26 70 kg/m²     General Appearance:    Alert, cooperative, no distress, appears stated age   Head:    Normocephalic, without obvious abnormality, atraumatic   Eyes:    No scleral icterus           Mouth:  Mucosa moist   Neck:   Supple, symmetrical, trachea midline, no thyromegaly       Lungs:     Clear to auscultation bilaterally, respirations unlabored       Heart:    Regular rate and rhythm, S1 and S2 normal, no murmur, rub   or gallop     Abdomen:     Soft, non-tender, bowel sounds active all four quadrants,     no masses, no organomegaly   Genitalia:   deferred   Rectal:   deferred   Extremities:   Extremities normal,no cyanosis or edema       Skin:   Skin color, texture, turgor normal, no rashes or lesions       Neurologic:   Grossly intact, no focal deficit           Lab Results:   Recent Results (from the past 24 hour(s))   CBC and differential    Collection Time: 01/18/23  8:59 AM   Result Value Ref Range    WBC 13 03 (H) 4 31 - 10 16 Thousand/uL    RBC 6 90 (H) 3 88 - 5 62 Million/uL    Hemoglobin 13 0 12 0 - 17 0 g/dL    Hematocrit 42 9 36 5 - 49 3 %    MCV 62 (L) 82 - 98 fL    MCH 18 8 (L) 26 8 - 34 3 pg    MCHC 30 3 (L) 31 4 - 37 4 g/dL    RDW 18 2 (H) 11 6 - 15 1 %    MPV 8 6 (L) 8 9 - 12 7 fL    Platelets 578 (H) 013 - 390 Thousands/uL    nRBC 0 /100 WBCs    Neutrophils Relative 86 (H) 43 - 75 %    Immat GRANS % 1 0 - 2 %    Lymphocytes Relative 5 (L) 14 - 44 %    Monocytes Relative 6 4 - 12 %    Eosinophils Relative 1 0 - 6 %    Basophils Relative 1 0 - 1 %    Neutrophils Absolute 11 38 (H) 1 85 - 7 62 Thousands/µL    Immature Grans Absolute 0 07 0 00 - 0 20 Thousand/uL    Lymphocytes Absolute 0 63 0 60 - 4 47 Thousands/µL    Monocytes Absolute 0 78 0 17 - 1 22 Thousand/µL    Eosinophils Absolute 0 08 0 00 - 0 61 Thousand/µL    Basophils Absolute 0 09 0 00 - 0 10 Thousands/µL   Comprehensive metabolic panel    Collection Time: 01/18/23  8:59 AM   Result Value Ref Range    Sodium 131 (L) 135 - 147 mmol/L    Potassium 3 5 3 5 - 5 3 mmol/L    Chloride 95 (L) 96 - 108 mmol/L    CO2 25 21 - 32 mmol/L    ANION GAP 11 4 - 13 mmol/L    BUN 11 5 - 25 mg/dL    Creatinine 1 26 0 60 - 1 30 mg/dL    Glucose 100 65 - 140 mg/dL    Calcium 9 6 8 3 - 10 1 mg/dL    AST 23 5 - 45 U/L    ALT 60 12 - 78 U/L    Alkaline Phosphatase 133 (H) 46 - 116 U/L    Total Protein 9 1 (H) 6 4 - 8 4 g/dL    Albumin 4 6 3 5 - 5 0 g/dL    Total Bilirubin 1 10 (H) 0 20 - 1 00 mg/dL    eGFR 76 ml/min/1 73sq m   Lipase    Collection Time: 01/18/23  8:59 AM   Result Value Ref Range    Lipase 74 73 - 393 u/L   Magnesium    Collection Time: 01/18/23  8:59 AM   Result Value Ref Range    Magnesium 1 6 1 6 - 2 6 mg/dL   Lactic acid    Collection Time: 01/18/23  8:59 AM   Result Value Ref Range    LACTIC ACID 1 2 0 5 - 2 0 mmol/L   Blood culture #1    Collection Time: 01/18/23  8:59 AM    Specimen: Arm, Right; Blood   Result Value Ref Range    Blood Culture Received in Microbiology Lab  Culture in Progress  Blood culture #2    Collection Time: 01/18/23  8:59 AM    Specimen: Arm, Right; Blood   Result Value Ref Range    Blood Culture Received in Microbiology Lab  Culture in Progress  UA (URINE) with reflex to Scope    Collection Time: 01/18/23 11:43 AM   Result Value Ref Range    Color, UA Yellow     Clarity, UA Clear     Specific Gravity, UA 1 025 1 000 - 1 030    pH, UA 6 0 5 0, 5 5, 6 0, 6 5, 7 0, 7 5, 8 0, 8 5, 9 0    Leukocytes, UA Negative Negative    Nitrite, UA Negative Negative    Protein, UA Negative Negative mg/dl    Glucose, UA Negative Negative mg/dl    Ketones, UA Negative Negative mg/dl    Urobilinogen, UA 0 2 0 2, 1 0 E U /dl E U /dl    Bilirubin, UA Negative Negative    Occult Blood, UA Trace-Intact (A) Negative   Urine Microscopic    Collection Time: 01/18/23 11:43 AM   Result Value Ref Range    RBC, UA 0-1 None Seen, 0-1, 1-2, 2-4, 0-5 /hpf    WBC, UA None Seen None Seen, 0-1, 1-2, 0-5, 2-4 /hpf    Epithelial Cells None Seen None Seen, Occasional /hpf    Bacteria, UA Occasional None Seen, Occasional /hpf       Imaging Studies: CT abdomen pelvis with contrast    Result Date: 1/18/2023  Narrative: CT ABDOMEN AND PELVIS WITH IV CONTRAST INDICATION:   Abdominal pain, acute, nonlocalized abdominal pain  COMPARISON:  None  TECHNIQUE:  CT examination of the abdomen and pelvis was performed  Axial, sagittal, and coronal 2D reformatted images were created from the source data and submitted for interpretation  Radiation dose length product (DLP) for this visit:  516 43 mGy-cm   This examination, like all CT scans performed in the [facility], was performed utilizing techniques to minimize radiation dose exposure, including the use of iterative  reconstruction and automated exposure control   IV Contrast:  100 mL of iohexol (OMNIPAQUE) Enteric Contrast:  Enteric contrast was not administered  FINDINGS: ABDOMEN LOWER CHEST:  No clinically significant abnormality identified in the visualized lower chest  LIVER/BILIARY TREE:  Unremarkable  GALLBLADDER:  No calcified gallstones  No pericholecystic inflammatory change  SPLEEN:  Unremarkable  PANCREAS:  Unremarkable  ADRENAL GLANDS:  Unremarkable  KIDNEYS/URETERS:  Unremarkable  No hydronephrosis  STOMACH AND BOWEL:  Right lower quadrant ileostomy  Reidentified fluid-filled J-pouch with mild mucosal hyperemia  APPENDIX:  No findings to suggest appendicitis  ABDOMINOPELVIC CAVITY:  No ascites  No pneumoperitoneum  No lymphadenopathy  VESSELS:  Unremarkable for patient's age  PELVIS REPRODUCTIVE ORGANS:  Unremarkable for patient's age  URINARY BLADDER:  Unremarkable  ABDOMINAL WALL/INGUINAL REGIONS:  Unremarkable  OSSEOUS STRUCTURES:  No acute fracture or destructive osseous lesion  Impression: No acute findings in the abdomen or pelvis  Right lower quadrant ileostomy  Reidentified fluid-filled J-pouch with persistent 3mild mucosal hyperemia  Workstation performed: [id number]       Assessment/Plan:   Abdominal pain  Patient presents abdominal pain,- complex PMH including IBD status post colectomy with J-pouch formation, proctitis, recent surgery 10/18 for diverting ileostomy complicated by prolapse and localized intra-abdominal infection   Plan for reconstruction of pouch in April at [facility], scheduled for April 20  -CT a/p on admission-No acute findings in the abdomen or pelvis  Right lower quadrant ileostomy    Reidentified fluid-filled J-pouch with persistent 3mild mucosal hyperemia which has been an ongoing finding  -CRP was last 6 7 in Nov 2022, now with elevation of 43 5, also noted to have leukocytosis  -Surgical pathology was reviewed from small-bowel resection which showed -  Small intestine with active chronic enteritis, transmural defect (measuring 5cm) and with marked active serositis  Negative for dysplasia, negative for granuloma   Margin closer to perforation is viable and shows active chronic enteritis  Other margin is viable and shows acute serositis  Immunohistochemical stain for CMV is positive in rare cells    -Fecal calprotectin on last admission was less than 16  -Patient with acute onset and some leukocytosis with subjective fever and chills, will rule out infectious etiology, studies ordered, continue n p o , also will check for COVID/flu/RSV due to some upper respiratory complaints accompanying his gi complaints  -Strict CHELLE's to measure ileostomy output, advised patient we will need to avoid Imodium until infectious etiology has been ruled    We will make further recommendations pending his course

## 2023-01-18 NOTE — ASSESSMENT & PLAN NOTE
Evident by leukocytosis, tachycardia    · CT abdomen negative for acute findings  · CXR-pending  · Possible GI source  · Blood cultures/stool cultures/UA-pending  · Lactic acid-WNL/Pro-Rubin-pending  · Levaquin and metronidazole empirically given  · Continue to monitor for signs of infecion

## 2023-01-18 NOTE — ASSESSMENT & PLAN NOTE
Likely secondary to uncontrolled nausea vomiting, replenish sodium replenish sodium and chloride    · Replenish sodium and chloride Na/Cl  · IVF   · will trend

## 2023-01-18 NOTE — H&P (VIEW-ONLY)
Consultation -[facility]   Beto Maradiaga 32 y o  male MRN: [id number]    Unit/Bed#: [address] [facility] Encounter: [id number]      Physician Requesting Consult: Dr Ella Min     Reason for Consult / Principal Problem: abd pain     HPI: This is a 24-year-old male who presents with abdominal pain  Has had multiple admissions with history of IBD suspected ulcerative colitis with J-pouch with recurrent pouchitis  There was also suspicion of Crohn's in the past but his surgeon strongly felt that he does not have Crohn's disease  Pt with a PMH of ankylosing spondylitis as well  Patient underwent multiple  surgeries at [facility] in Oct 2022  Pt is s/p lap ileostomy creation 10/13/22, course c/b stoma proximal limb evisceration on 10/18, s/p revision, had postop pain, tachycardia, fever, s/p ex lap, washout, end ileostomy creation, with purulent ascites   Plan was for reconstruction of his pouch in April, surgeon would like to wait until then to prevent further adhesions  Patient reports that he started having significant diarrhea which began last night and was feeling feverish and chills  He denies any sick contacts or recent antibiotic use  He states that he has been having to empty his ileostomy bag every 30 minutes and has significant liquid green stool output  He was feeling dizzy and lightheaded  After last admission he went to [facility] and they put a stent in his ileostomy area that helped drain but this was only in for a few days  They thought that maybe he was intermittently obstructing and then having a large amount of output at 1 time  States he was doing well since around Thanksgiving until this started yesterday  Reports some sore throat and some coughing  Allergies:    Allergies   Allergen Reactions   • Cefazolin Hives     Other reaction(s): Arrythmia (Abnormal Heartbeat), Rash Maculopapular   • Mesalamine GI Intolerance     Other reaction(s): Pancreatitis  [facility] - [id number]: pancreatitis   • Wound Dressings Rash     [employer] ostomy Products        Medications:  Current Facility-Administered Medications:   •  enoxaparin (LOVENOX) subcutaneous injection 40 mg, 40 mg, Subcutaneous, Daily, Matheus Rodríguez MD  •  HYDROmorphone (DILAUDID) injection 1 mg, 1 mg, Intravenous, Q4H PRN, Matheus Rodríguez MD  •  labetalol (NORMODYNE) injection 10 mg, 10 mg, Intravenous, Q6H PRN, Matheus Rodríguez MD  •  levofloxacin (LEVAQUIN) IVPB (premix in dextrose) 750 mg 150 mL, 750 mg, Intravenous, Q24H **AND** metroNIDAZOLE (FLAGYL) IVPB (premix) 500 mg 100 mL, 500 mg, Intravenous, Once, Matheus Rodríguez MD  •  LORazepam (ATIVAN) injection 0 5 mg, 0 5 mg, Intravenous, Q6H PRN, Matheus Rodríguez MD  •  ondansetron [facility]) injection 4 mg, 4 mg, Intravenous, Q6H PRN, Matheus Rodríguez MD  •  pantoprazole (PROTONIX) injection 40 mg, 40 mg, Intravenous, Q24H [address], Matheus Rodríguez MD  •  sodium chloride 0 9 % infusion, 125 mL/hr, Intravenous, Continuous, Matheus Rodríguez MD    Past Medical history:  Past Medical History:   Diagnosis Date   • Ankylosing spondylitis ([facility] )    • Anxiety    • Bowel obstruction ([facility] )    • Clostridium difficile colitis 9/13/2018   • Colitis    • 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[facility] ) 9/13/2018   • Pancreatitis    • Ulcerative colitis ([facility] )        Past Surgical History:   Past Surgical History:   Procedure Laterality Date   • COLECTOMY TOTAL      with ileal pouch and anastemosis   • IR PICC PLACEMENT SINGLE LUMEN  3/1/2022   • TOTAL COLECTOMY         Family History: History reviewed  No pertinent family history      Social history:   Social History     Socioeconomic History   • Marital status: Single     Spouse name: Not on file   • Number of children: Not on file   • Years of education: Not on file   • Highest education level: Not on file   Occupational History   • Not on file   Tobacco Use   • Smoking status: Never   • Smokeless tobacco: Never   Vaping Use   • Vaping Use: Never used   Substance and Sexual Activity   • Alcohol use: Not Currently     Comment: pt states 5 a month/socially   • Drug use: No   • Sexual activity: Not on file   Other Topics Concern   • Not on file   Social History Narrative   • Not on file     Social Determinants of Health     Financial Resource Strain: Not on file   Food Insecurity: No Food Insecurity   • Worried About Running Out of Food in the Last Year: Never true   • Ran Out of Food in the Last Year: Never true   Transportation Needs: No Transportation Needs   • Lack of Transportation (Medical): No   • Lack of Transportation (Non-Medical):  No   Physical Activity: Not on file   Stress: Not on file   Social Connections: Not on file   Intimate Partner Violence: Not on file   Housing Stability: Low Risk    • Unable to Pay for Housing in the Last Year: No   • Number of Places Lived in the Last Year: 1   • Unstable Housing in the Last Year: No       Review of Systems: All other systems were reviewed and were negative, otherwise please refer to HPI    Physical Exam: /88   Pulse (!) 121   Temp 98 9 °F (37 2 °C)   Resp 16   Ht 5' 9" (1 753 m)   Wt 82 kg (180 lb 12 4 oz)   SpO2 96%   BMI 26 70 kg/m²     General Appearance:    Alert, cooperative, no distress, appears stated age   Head:    Normocephalic, without obvious abnormality, atraumatic   Eyes:    No scleral icterus           Mouth:  Mucosa moist   Neck:   Supple, symmetrical, trachea midline, no thyromegaly       Lungs:     Clear to auscultation bilaterally, respirations unlabored       Heart:    Regular rate and rhythm, S1 and S2 normal, no murmur, rub   or gallop     Abdomen:     Soft, non-tender, bowel sounds active all four quadrants,     no masses, no organomegaly   Genitalia:   deferred   Rectal:   deferred   Extremities:   Extremities normal,no cyanosis or edema       Skin:   Skin color, texture, turgor normal, no rashes or lesions       Neurologic:   Grossly intact, no focal deficit           Lab Results:   Recent Results (from the past 24 hour(s))   CBC and differential    Collection Time: 01/18/23  8:59 AM   Result Value Ref Range    WBC 13 03 (H) 4 31 - 10 16 Thousand/uL    RBC 6 90 (H) 3 88 - 5 62 Million/uL    Hemoglobin 13 0 12 0 - 17 0 g/dL    Hematocrit 42 9 36 5 - 49 3 %    MCV 62 (L) 82 - 98 fL    MCH 18 8 (L) 26 8 - 34 3 pg    MCHC 30 3 (L) 31 4 - 37 4 g/dL    RDW 18 2 (H) 11 6 - 15 1 %    MPV 8 6 (L) 8 9 - 12 7 fL    Platelets 897 (H) 343 - 390 Thousands/uL    nRBC 0 /100 WBCs    Neutrophils Relative 86 (H) 43 - 75 %    Immat GRANS % 1 0 - 2 %    Lymphocytes Relative 5 (L) 14 - 44 %    Monocytes Relative 6 4 - 12 %    Eosinophils Relative 1 0 - 6 %    Basophils Relative 1 0 - 1 %    Neutrophils Absolute 11 38 (H) 1 85 - 7 62 Thousands/µL    Immature Grans Absolute 0 07 0 00 - 0 20 Thousand/uL    Lymphocytes Absolute 0 63 0 60 - 4 47 Thousands/µL    Monocytes Absolute 0 78 0 17 - 1 22 Thousand/µL    Eosinophils Absolute 0 08 0 00 - 0 61 Thousand/µL    Basophils Absolute 0 09 0 00 - 0 10 Thousands/µL   Comprehensive metabolic panel    Collection Time: 01/18/23  8:59 AM   Result Value Ref Range    Sodium 131 (L) 135 - 147 mmol/L    Potassium 3 5 3 5 - 5 3 mmol/L    Chloride 95 (L) 96 - 108 mmol/L    CO2 25 21 - 32 mmol/L    ANION GAP 11 4 - 13 mmol/L    BUN 11 5 - 25 mg/dL    Creatinine 1 26 0 60 - 1 30 mg/dL    Glucose 100 65 - 140 mg/dL    Calcium 9 6 8 3 - 10 1 mg/dL    AST 23 5 - 45 U/L    ALT 60 12 - 78 U/L    Alkaline Phosphatase 133 (H) 46 - 116 U/L    Total Protein 9 1 (H) 6 4 - 8 4 g/dL    Albumin 4 6 3 5 - 5 0 g/dL    Total Bilirubin 1 10 (H) 0 20 - 1 00 mg/dL    eGFR 76 ml/min/1 73sq m   Lipase    Collection Time: 01/18/23  8:59 AM   Result Value Ref Range    Lipase 74 73 - 393 u/L   Magnesium    Collection Time: 01/18/23  8:59 AM   Result Value Ref Range    Magnesium 1 6 1 6 - 2 6 mg/dL   Lactic acid    Collection Time: 01/18/23  8:59 AM   Result Value Ref Range    LACTIC ACID 1 2 0 5 - 2 0 mmol/L   Blood culture #1    Collection Time: 01/18/23  8:59 AM    Specimen: Arm, Right; Blood   Result Value Ref Range    Blood Culture Received in Microbiology Lab  Culture in Progress  Blood culture #2    Collection Time: 01/18/23  8:59 AM    Specimen: Arm, Right; Blood   Result Value Ref Range    Blood Culture Received in Microbiology Lab  Culture in Progress  UA (URINE) with reflex to Scope    Collection Time: 01/18/23 11:43 AM   Result Value Ref Range    Color, UA Yellow     Clarity, UA Clear     Specific Gravity, UA 1 025 1 000 - 1 030    pH, UA 6 0 5 0, 5 5, 6 0, 6 5, 7 0, 7 5, 8 0, 8 5, 9 0    Leukocytes, UA Negative Negative    Nitrite, UA Negative Negative    Protein, UA Negative Negative mg/dl    Glucose, UA Negative Negative mg/dl    Ketones, UA Negative Negative mg/dl    Urobilinogen, UA 0 2 0 2, 1 0 E U /dl E U /dl    Bilirubin, UA Negative Negative    Occult Blood, UA Trace-Intact (A) Negative   Urine Microscopic    Collection Time: 01/18/23 11:43 AM   Result Value Ref Range    RBC, UA 0-1 None Seen, 0-1, 1-2, 2-4, 0-5 /hpf    WBC, UA None Seen None Seen, 0-1, 1-2, 0-5, 2-4 /hpf    Epithelial Cells None Seen None Seen, Occasional /hpf    Bacteria, UA Occasional None Seen, Occasional /hpf       Imaging Studies: CT abdomen pelvis with contrast    Result Date: 1/18/2023  Narrative: CT ABDOMEN AND PELVIS WITH IV CONTRAST INDICATION:   Abdominal pain, acute, nonlocalized abdominal pain  COMPARISON:  None  TECHNIQUE:  CT examination of the abdomen and pelvis was performed  Axial, sagittal, and coronal 2D reformatted images were created from the source data and submitted for interpretation  Radiation dose length product (DLP) for this visit:  516 43 mGy-cm   This examination, like all CT scans performed in the [facility], was performed utilizing techniques to minimize radiation dose exposure, including the use of iterative  reconstruction and automated exposure control   IV Contrast:  100 mL of iohexol (OMNIPAQUE) Enteric Contrast:  Enteric contrast was not administered  FINDINGS: ABDOMEN LOWER CHEST:  No clinically significant abnormality identified in the visualized lower chest  LIVER/BILIARY TREE:  Unremarkable  GALLBLADDER:  No calcified gallstones  No pericholecystic inflammatory change  SPLEEN:  Unremarkable  PANCREAS:  Unremarkable  ADRENAL GLANDS:  Unremarkable  KIDNEYS/URETERS:  Unremarkable  No hydronephrosis  STOMACH AND BOWEL:  Right lower quadrant ileostomy  Reidentified fluid-filled J-pouch with mild mucosal hyperemia  APPENDIX:  No findings to suggest appendicitis  ABDOMINOPELVIC CAVITY:  No ascites  No pneumoperitoneum  No lymphadenopathy  VESSELS:  Unremarkable for patient's age  PELVIS REPRODUCTIVE ORGANS:  Unremarkable for patient's age  URINARY BLADDER:  Unremarkable  ABDOMINAL WALL/INGUINAL REGIONS:  Unremarkable  OSSEOUS STRUCTURES:  No acute fracture or destructive osseous lesion  Impression: No acute findings in the abdomen or pelvis  Right lower quadrant ileostomy  Reidentified fluid-filled J-pouch with persistent 3mild mucosal hyperemia  Workstation performed: [id number]       Assessment/Plan:   Abdominal pain  Patient presents abdominal pain,- complex PMH including IBD status post colectomy with J-pouch formation, proctitis, recent surgery 10/18 for diverting ileostomy complicated by prolapse and localized intra-abdominal infection   Plan for reconstruction of pouch in April at [facility], scheduled for April 20  -CT a/p on admission-No acute findings in the abdomen or pelvis  Right lower quadrant ileostomy    Reidentified fluid-filled J-pouch with persistent 3mild mucosal hyperemia which has been an ongoing finding  -CRP was last 6 7 in Nov 2022, now with elevation of 43 5, also noted to have leukocytosis  -Surgical pathology was reviewed from small-bowel resection which showed -  Small intestine with active chronic enteritis, transmural defect (measuring 5cm) and with marked active serositis  Negative for dysplasia, negative for granuloma   Margin closer to perforation is viable and shows active chronic enteritis  Other margin is viable and shows acute serositis  Immunohistochemical stain for CMV is positive in rare cells    -Fecal calprotectin on last admission was less than 16  -Patient with acute onset and some leukocytosis with subjective fever and chills, will rule out infectious etiology, studies ordered, continue n p o , also will check for COVID/flu/RSV due to some upper respiratory complaints accompanying his gi complaints  -Strict CHELLE's to measure ileostomy output, advised patient we will need to avoid Imodium until infectious etiology has been ruled    We will make further recommendations pending his course

## 2023-01-19 LAB
ALBUMIN SERPL BCP-MCNC: 3.1 G/DL (ref 3.5–5)
ALP SERPL-CCNC: 89 U/L (ref 46–116)
ALT SERPL W P-5'-P-CCNC: 32 U/L (ref 12–78)
ANION GAP SERPL CALCULATED.3IONS-SCNC: 12 MMOL/L (ref 4–13)
AST SERPL W P-5'-P-CCNC: 19 U/L (ref 5–45)
BASOPHILS # BLD AUTO: 0.09 THOUSANDS/ÂΜL (ref 0–0.1)
BASOPHILS NFR BLD AUTO: 1 % (ref 0–1)
BILIRUB SERPL-MCNC: 0.74 MG/DL (ref 0.2–1)
BUN SERPL-MCNC: 8 MG/DL (ref 5–25)
C DIFF TOX GENS STL QL NAA+PROBE: NEGATIVE
CALCIUM ALBUM COR SERPL-MCNC: 9.2 MG/DL (ref 8.3–10.1)
CALCIUM SERPL-MCNC: 8.5 MG/DL (ref 8.3–10.1)
CAMPYLOBACTER DNA SPEC NAA+PROBE: NORMAL
CHLORIDE SERPL-SCNC: 103 MMOL/L (ref 96–108)
CO2 SERPL-SCNC: 22 MMOL/L (ref 21–32)
CREAT SERPL-MCNC: 1.08 MG/DL (ref 0.6–1.3)
EOSINOPHIL # BLD AUTO: 0.23 THOUSAND/ÂΜL (ref 0–0.61)
EOSINOPHIL NFR BLD AUTO: 4 % (ref 0–6)
ERYTHROCYTE [DISTWIDTH] IN BLOOD BY AUTOMATED COUNT: 16.1 % (ref 11.6–15.1)
GFR SERPL CREATININE-BSD FRML MDRD: 92 ML/MIN/1.73SQ M
GLUCOSE SERPL-MCNC: 75 MG/DL (ref 65–140)
HCT VFR BLD AUTO: 32.2 % (ref 36.5–49.3)
HGB BLD-MCNC: 9.7 G/DL (ref 12–17)
IMM GRANULOCYTES # BLD AUTO: 0.02 THOUSAND/UL (ref 0–0.2)
IMM GRANULOCYTES NFR BLD AUTO: 0 % (ref 0–2)
LYMPHOCYTES # BLD AUTO: 1.06 THOUSANDS/ÂΜL (ref 0.6–4.47)
LYMPHOCYTES NFR BLD AUTO: 17 % (ref 14–44)
MCH RBC QN AUTO: 18.9 PG (ref 26.8–34.3)
MCHC RBC AUTO-ENTMCNC: 30.1 G/DL (ref 31.4–37.4)
MCV RBC AUTO: 63 FL (ref 82–98)
MONOCYTES # BLD AUTO: 0.83 THOUSAND/ÂΜL (ref 0.17–1.22)
MONOCYTES NFR BLD AUTO: 13 % (ref 4–12)
NEUTROPHILS # BLD AUTO: 4.15 THOUSANDS/ÂΜL (ref 1.85–7.62)
NEUTS SEG NFR BLD AUTO: 65 % (ref 43–75)
NRBC BLD AUTO-RTO: 0 /100 WBCS
PLATELET # BLD AUTO: 258 THOUSANDS/UL (ref 149–390)
PMV BLD AUTO: 8.5 FL (ref 8.9–12.7)
POTASSIUM SERPL-SCNC: 3.4 MMOL/L (ref 3.5–5.3)
PROT SERPL-MCNC: 6.2 G/DL (ref 6.4–8.4)
RBC # BLD AUTO: 5.12 MILLION/UL (ref 3.88–5.62)
RV AG STL QL: NEGATIVE
SALMONELLA DNA SPEC QL NAA+PROBE: NORMAL
SHIGA TOXIN STX GENE SPEC NAA+PROBE: NORMAL
SHIGELLA DNA SPEC QL NAA+PROBE: NORMAL
SODIUM SERPL-SCNC: 137 MMOL/L (ref 135–147)
WBC # BLD AUTO: 6.38 THOUSAND/UL (ref 4.31–10.16)

## 2023-01-19 RX ORDER — ACETAMINOPHEN 325 MG/1
650 TABLET ORAL EVERY 6 HOURS PRN
Status: DISCONTINUED | OUTPATIENT
Start: 2023-01-19 | End: 2023-01-24 | Stop reason: HOSPADM

## 2023-01-19 RX ADMIN — HYDROMORPHONE HYDROCHLORIDE 1 MG: 1 INJECTION, SOLUTION INTRAMUSCULAR; INTRAVENOUS; SUBCUTANEOUS at 01:41

## 2023-01-19 RX ADMIN — ONDANSETRON 4 MG: 2 INJECTION INTRAMUSCULAR; INTRAVENOUS at 06:01

## 2023-01-19 RX ADMIN — PANTOPRAZOLE SODIUM 40 MG: 40 INJECTION, POWDER, FOR SOLUTION INTRAVENOUS at 08:14

## 2023-01-19 RX ADMIN — HYDROMORPHONE HYDROCHLORIDE 1 MG: 1 INJECTION, SOLUTION INTRAMUSCULAR; INTRAVENOUS; SUBCUTANEOUS at 22:49

## 2023-01-19 RX ADMIN — HYDROMORPHONE HYDROCHLORIDE 1 MG: 1 INJECTION, SOLUTION INTRAMUSCULAR; INTRAVENOUS; SUBCUTANEOUS at 06:00

## 2023-01-19 RX ADMIN — ENOXAPARIN SODIUM 40 MG: 40 INJECTION SUBCUTANEOUS at 08:14

## 2023-01-19 RX ADMIN — HYDROMORPHONE HYDROCHLORIDE 1 MG: 1 INJECTION, SOLUTION INTRAMUSCULAR; INTRAVENOUS; SUBCUTANEOUS at 10:28

## 2023-01-19 RX ADMIN — DULOXETINE HYDROCHLORIDE 60 MG: 60 CAPSULE, DELAYED RELEASE ORAL at 08:14

## 2023-01-19 RX ADMIN — HYDROMORPHONE HYDROCHLORIDE 1 MG: 1 INJECTION, SOLUTION INTRAMUSCULAR; INTRAVENOUS; SUBCUTANEOUS at 14:28

## 2023-01-19 RX ADMIN — ACETAMINOPHEN 650 MG: 325 TABLET, FILM COATED ORAL at 02:08

## 2023-01-19 RX ADMIN — TRIMETHOBENZAMIDE HYDROCHLORIDE 200 MG: 100 INJECTION INTRAMUSCULAR at 10:46

## 2023-01-19 RX ADMIN — SODIUM CHLORIDE 125 ML/HR: 0.9 INJECTION, SOLUTION INTRAVENOUS at 02:15

## 2023-01-19 RX ADMIN — HYDROMORPHONE HYDROCHLORIDE 1 MG: 1 INJECTION, SOLUTION INTRAMUSCULAR; INTRAVENOUS; SUBCUTANEOUS at 18:34

## 2023-01-19 RX ADMIN — ONDANSETRON 4 MG: 2 INJECTION INTRAMUSCULAR; INTRAVENOUS at 18:34

## 2023-01-19 NOTE — ASSESSMENT & PLAN NOTE
Evident by leukocytosis, tachycardia    · CT abdomen negative for acute findings  · CXR-pending  · Possible GI source  · Blood cultures-no growth 24 hours  · stool cultures-negative for bacteria, positive for occult blood  · UA--WNL  · Lactic acid-WNL/Pro-Rubin-pending  · Levaquin and metronidazole empirically- continue per GI recommendations  · Continue to monitor for signs of infecion

## 2023-01-19 NOTE — ASSESSMENT & PLAN NOTE
Chronic,     · CRP-43 5 > trend if need to  · Likely multifactorial from infection versus, underlying autoimmune condition

## 2023-01-19 NOTE — ASSESSMENT & PLAN NOTE
Managed by [facility]    Patient reports changing ostomy bag every 3 to 4 days    · Revised 3 months ago, not nonproblematic  · I's and O's on output

## 2023-01-19 NOTE — UTILIZATION REVIEW
Initial Clinical Review  O  bservation 1/18/23 @ 2144 converted to inpatient admission 1/19/23 @  for continued care & tx for abd pain, N&V  Admission: Date/Time/Statement:   Admission Orders (From admission, onward)     Ordered        01/19/23 1714  Inpatient Admission  Once            01/18/23 1358  Place in Observation  Once                      Orders Placed This Encounter   Procedures   • Inpatient Admission     Standing Status:   Standing     Number of Occurrences:   1     Order Specific Question:   Level of Care     Answer:   Med Surg [16]     Order Specific Question:   Estimated length of stay     Answer:   More than 2 Midnights     Order Specific Question:   Certification     Answer:   I certify that inpatient services are medically necessary for this patient for a duration of greater than two midnights  See H&P and MD Progress Notes for additional information about the patient's course of treatment  ED Arrival Information     Expected   -    Arrival   1/18/2023 08:36    Acuity   Urgent            Means of arrival   Walk-In    Escorted by   [facility]    Service   Hospitalist    Admission type   Emergency            Arrival complaint   Abdominal pain           Chief Complaint   Patient presents with   • Abdominal Pain     Pt c/o nausea and pain since 1730 1/17/23       Initial Presentation:   32 yom to ER from home c/o diffuse abd pain & vomiting since yesterday, increased loose light green stools from ostomy, now dizzy & lightheaded  Hx UC,  ileal pouchitis currently has an ileostomy  Presents febrile, tachycardic, generalized abd tenderness  Admission work-up showing hyponatremia, leukocytosis, elevated CRP  Placed in observation status for abd pain, N&V  GI consulted  Stool studies pending      Per GI: abd pain  Acute onset and some leukocytosis with subjective fever and chills, will rule out infectious etiology, studies ordered, continue n p o , also will check for COVID/flu/RSV due to some upper respiratory complaints accompanying his gi complaints  Strict I&O's to measure ileostomy output, advised patient we will need to avoid Imodium until infectious etiology has been ruled    Observation to IP admission 1/19/23:  N/V/D on admission with abd pain  Abd-soft positive bowel sounds nondistended, some vague tenderness diffusely to palpation  , + right lower quadrant ileostomy in place  Multiple doses IV Dilaudid & IV Zofran required for ongoing abd pain & nausea/vomiting respectively  Trial advancing diet to clear liquids       ED Triage Vitals [01/18/23 0845]   Temperature Pulse Respirations Blood Pressure SpO2   99 6 °F (37 6 °C) (!) 132 20 138/88 97 %      Temp Source Heart Rate Source Patient Position - Orthostatic VS BP Location FiO2 (%)   Oral Monitor Lying Left arm --      Pain Score       10 - Worst Possible Pain          Wt Readings from Last 1 Encounters:   01/18/23 82 kg (180 lb 12 4 oz)     Additional Vital Signs:   01/19/23 07:32:55 97 4 °F (36 3 °C) Abnormal  77 -- 100/63 75 96 % -- --   01/19/23 03:22:14 97 8 °F (36 6 °C) 79 16 114/72 86 96 % -- --   01/18/23 2353 98 7 °F (37 1 °C) 89 18 115/73 87 97 % -- --   01/18/23 20:02:36 100 5 °F (38 1 °C) 108 Abnormal  18 127/86 100 97 % -- --   01/18/23 15:02:02 98 9 °F (37 2 °C) 121 Abnormal  -- 129/88 102 96 % -- --   01/18/23 15:01:41 98 9 °F (37 2 °C) 116 Abnormal  18 129/88 102 97 % None (Room air) Lying   01/18/23 1430 98 6 °F (37 °C) 113 Abnormal  16 119/72 90 95 % -- --   01/18/23 1400 -- 113 Abnormal  18 126/73 92 96 % -- --   01/18/23 1330 -- 118 Abnormal  -- 133/79 100 99 % -- --   01/18/23 1300 -- 152 Abnormal  -- 134/80 101 -- -- --   01/18/23 1230 -- 119 Abnormal  15 136/82 103 96 % -- --   01/18/23 1200 -- 129 Abnormal  15 138/81 99 95 % -- --   01/18/23 1143 -- -- -- 143/80 -- 98 % None (Room air) Lying   01/18/23 1142 99 °F (37 2 °C) 117 Abnormal  22 -- -- -- -- --     Pertinent Labs/Diagnostic Test Results:   CT abdomen pelvis with contrast   Final Result (01/18 1239)      No acute findings in the abdomen or pelvis  Right lower quadrant ileostomy  Reidentified fluid-filled J-pouch with persistent 3mild mucosal hyperemia       Results from last 7 days   Lab Units 01/18/23  1705   SARS-COV-2  Negative     Results from last 7 days   Lab Units 01/19/23  0529 01/18/23  0859   WBC Thousand/uL 6 38 13 03*   HEMOGLOBIN g/dL 9 7* 13 0   HEMATOCRIT % 32 2* 42 9   PLATELETS Thousands/uL 258 399*   NEUTROS ABS Thousands/µL 4 15 11 38*     Results from last 7 days   Lab Units 01/19/23  0529 01/18/23  0859   SODIUM mmol/L 137 131*   POTASSIUM mmol/L 3 4* 3 5   CHLORIDE mmol/L 103 95*   CO2 mmol/L 22 25   ANION GAP mmol/L 12 11   BUN mg/dL 8 11   CREATININE mg/dL 1 08 1 26   EGFR ml/min/1 73sq m 92 76   CALCIUM mg/dL 8 5 9 6   MAGNESIUM mg/dL  --  1 6     Results from last 7 days   Lab Units 01/19/23  0529 01/18/23  0859   AST U/L 19 23   ALT U/L 32 60   ALK PHOS U/L 89 133*   TOTAL PROTEIN g/dL 6 2* 9 1*   ALBUMIN g/dL 3 1* 4 6   TOTAL BILIRUBIN mg/dL 0 74 1 10*       Results from last 7 days   Lab Units 01/19/23  0529 01/18/23  0859   GLUCOSE RANDOM mg/dL 75 100     Results from last 7 days   Lab Units 01/18/23  0859   TSH 3RD GENERATON uIU/mL 1 514       Results from last 7 days   Lab Units 01/18/23  0859   LACTIC ACID mmol/L 1 2       Results from last 7 days   Lab Units 01/18/23  0859   LIPASE u/L 74     Results from last 7 days   Lab Units 01/18/23  0859   CRP mg/L 43 5*       Results from last 7 days   Lab Units 01/18/23  1143   CLARITY UA  Clear   COLOR UA  Yellow   SPEC GRAV UA  1 025   PH UA  6 0   GLUCOSE UA mg/dl Negative   KETONES UA mg/dl Negative   BLOOD UA  Trace-Intact*   PROTEIN UA mg/dl Negative   NITRITE UA  Negative   BILIRUBIN UA  Negative   UROBILINOGEN UA E U /dl 0 2   LEUKOCYTES UA  Negative   WBC UA /hpf None Seen   RBC UA /hpf 0-1   BACTERIA UA /hpf Occasional   EPITHELIAL CELLS WET PREP /hpf None Seen     Results from last 7 days   Lab Units 01/18/23  1705   INFLUENZA A PCR  Negative   INFLUENZA B PCR  Negative   RSV PCR  Negative     Results from last 7 days   Lab Units 01/18/23  0859   BLOOD CULTURE  No Growth at 24 hrs  No Growth at 24 hrs       Component Value Flag Ref Range Units Status   Rotavirus Negative   Negative  Final     Fecal Occult Blood Diagnostic Negative Positive Abnormal       Component Ref Range & Units 1/18/23 1705   Salmonella sp PCR None Detected None Detected    Shigella sp/Enteroinvasive E  coli (EIEC) PCR None Detected None Detected    Campylobacter sp (jejuni and coli) PCR None Detected None Detected    Shiga toxin 1/Shiga toxin 2 genes PCR None Detected None Detected      Component Ref Range & Units 1/18/23 1705   C difficile toxin by PCR Negative Negative      ED Treatment:   Medication Administration from 01/18/2023 0835 to 01/18/2023 1455       Date/Time Order Dose Route Action     01/18/2023 0905 EST ondansetron (ZOFRAN) injection 4 mg 4 mg Intravenous Given     01/18/2023 0905 EST HYDROmorphone (DILAUDID) injection 1 mg 1 mg Intravenous Given     01/18/2023 0902 EST sodium chloride 0 9 % bolus 1,000 mL 1,000 mL Intravenous New Bag     01/18/2023 0922 EST barium (READI-CAT 2) suspension 900 mL 900 mL Oral Given     01/18/2023 1011 EST HYDROmorphone (DILAUDID) injection 1 mg 1 mg Intravenous Given     01/18/2023 1013 EST ondansetron (ZOFRAN) injection 4 mg 4 mg Intravenous Given     01/18/2023 1129 EST iohexol (OMNIPAQUE) 350 MG/ML injection (SINGLE-DOSE) 100 mL 100 mL Intravenous Given     01/18/2023 1154 EST HYDROmorphone (DILAUDID) injection 1 mg 1 mg Intravenous Given     01/18/2023 1203 EST sodium chloride 0 9 % bolus 1,000 mL 1,000 mL Intravenous New Bag     01/18/2023 1303 EST HYDROmorphone (DILAUDID) injection 1 mg 1 mg Intravenous Given     01/18/2023 1302 EST ondansetron (ZOFRAN) injection 4 mg 4 mg Intravenous Given        Past Medical History:   Diagnosis Date   • Ankylosing spondylitis (HCC) • Anxiety    • Bowel obstruction (HCC)    • Clostridium difficile colitis 9/13/2018   • Colitis    • 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 ) 9/13/2018   • Pancreatitis    • Ulcerative colitis ([facility] )      Present on Admission:  • Abdominal pain  • Elevated C-reactive protein (CRP)  • Complications of intestinal pouch (HCC)  • Chronic pain syndrome  • CAR (generalized anxiety disorder)  • SIRS (systemic inflammatory response syndrome) ([facility])    Admitting Diagnosis: Nausea [R11 0]  Vomiting [R11 10]  Abdominal pain [R10 9]  Age/Sex: 32 y o  male  Admission Orders:  Scd/foot pumps  Consult GI    Scheduled Medications:  DULoxetine, 60 mg, Oral, Daily  enoxaparin, 40 mg, Subcutaneous, Daily  pantoprazole, 40 mg, Intravenous, Q24H [facility]    Continuous IV Infusions:  sodium chloride, 125 mL/hr, Intravenous, Continuous    PRN Meds:  acetaminophen, 650 mg, Oral, Q6H PRN  HYDROmorphone, 1 mg, Intravenous, Q4H PRN, 1/18 x2, 1/19 x4  labetalol, 10 mg, Intravenous, Q6H PRN  LORazepam, 0 5 mg, Intravenous, Q6H PRN  ondansetron, 4 mg, Intravenous, Q6H PRN, 1/18 x2, 1/19 x1  trimethobenzamide (TIGAN) IM injection 200 mg, Q6H, PRN, 1/19 x1    Network Utilization Review Department  ATTENTION: Please call with any questions or concerns to [phone] and carefully listen to the prompts so that you are directed to the right person  All voicemails are confidential   Harinder Lemon all requests for admission clinical reviews, approved or denied determinations and any other requests to dedicated fax number below belonging to the campus where the patient is receiving treatment   List of dedicated fax numbers for the Facilities:  [address] DENIALS (Administrative/Medical Necessity) [phone]   [address] (Maternity/NICU/Pediatrics) [phone]   [address] [facility] [address] [place] [address] Carmine Cortes [address] [facility] [place] [address] [phone]

## 2023-01-19 NOTE — ASSESSMENT & PLAN NOTE
Recurrent, unresolved, patient followed by [facility] specialist in [facility]    · CT abdomen:No acute findings in the abdomen or pelvis  Right lower quadrant ileostomy  Reidentified fluid-filled J-pouch with persistent 3mild mucosal hyperemia    · Pain management  · GI consulted recs: CRP was last 6 7 in Nov 2022, now with elevation of 43 5, also noted to have leukocytosis on admission  which now has resolved   -Fecal calprotectin on last admission was less than 16   -Patient with acute onset and some leukocytosis with subjective fever and chills, will rule out infectious  etiology, stool studies ordered, neg for covid/flu/rsv   -Strict I and O's to measure ileostomy output   -Stool was negative for C  difficile as well as enteric panel, Giardia is pending and rotavirus was negative   -Patient requesting to have diet advanced, will start on clear liquids with toast and crackers

## 2023-01-19 NOTE — PLAN OF CARE
Problem: PAIN - ADULT  Goal: Verbalizes/displays adequate comfort level or baseline comfort level  Description: Interventions:  - Encourage patient to monitor pain and request assistance  - Assess pain using appropriate pain scale  - Administer analgesics based on type and severity of pain and evaluate response  - Implement non-pharmacological measures as appropriate and evaluate response  - Consider cultural and social influences on pain and pain management  - Notify physician/advanced practitioner if interventions unsuccessful or patient reports new pain  Outcome: Progressing     Problem: INFECTION - ADULT  Goal: Absence or prevention of progression during hospitalization  Description: INTERVENTIONS:  - Assess and monitor for signs and symptoms of infection  - Monitor lab/diagnostic results  - Monitor all insertion sites, i e  indwelling lines, tubes, and drains  - Monitor endotracheal if appropriate and nasal secretions for changes in amount and color  - [place] appropriate cooling/warming therapies per order  - Administer medications as ordered  - Instruct and encourage patient and family to use good hand hygiene technique  - Identify and instruct in appropriate isolation precautions for identified infection/condition  Outcome: Progressing  Goal: Absence of fever/infection during neutropenic period  Description: INTERVENTIONS:  - Monitor WBC    Outcome: Progressing     Problem: SAFETY ADULT  Goal: Patient will remain free of falls  Description: INTERVENTIONS:  - Educate patient/family on patient safety including physical limitations  - Instruct patient to call for assistance with activity   - Consult OT/PT to assist with strengthening/mobility   - Keep Call bell within reach  - Keep bed low and locked with side rails adjusted as appropriate  - Keep care items and personal belongings within reach  - Initiate and maintain comfort rounds  - Make Fall Risk Sign visible to staff  - Offer Toileting every 2 Hours, in advance of need  - Initiate/Maintain bed alarm  - Obtain necessary fall risk management equipment:   - Apply yellow socks and bracelet for high fall risk patients  - Consider moving patient to room near nurses station  Outcome: Progressing  Goal: Maintain or return to baseline ADL function  Description: INTERVENTIONS:  -  Assess patient's ability to carry out ADLs; assess patient's baseline for ADL function and identify physical deficits which impact ability to perform ADLs (bathing, care of mouth/teeth, toileting, grooming, dressing, etc )  - Assess/evaluate cause of self-care deficits   - Assess range of motion  - Assess patient's mobility; develop plan if impaired  - Assess patient's need for assistive devices and provide as appropriate  - Encourage maximum independence but intervene and supervise when necessary  - Involve family in performance of ADLs  - Assess for home care needs following discharge   - Consider OT consult to assist with ADL evaluation and planning for discharge  - Provide patient education as appropriate  Outcome: Progressing  Goal: Maintains/Returns to pre admission functional level  Description: INTERVENTIONS:  - Perform BMAT or MOVE assessment daily    - Set and communicate daily mobility goal to care team and patient/family/caregiver  - Collaborate with rehabilitation services on mobility goals if consulted  - Perform Range of Motion 3 times a day  - Reposition patient every 2 hours    - Dangle patient 3 times a day  - Stand patient 3 times a day  - Ambulate patient 3 times a day  - Out of bed to chair 3 times a day   - Out of bed for meals 3  Problem: DISCHARGE PLANNING  Goal: Discharge to home or other facility with appropriate resources  Description: INTERVENTIONS:  - Identify barriers to discharge w/patient and caregiver  - Arrange for needed discharge resources and transportation as appropriate  - Identify discharge learning needs (meds, wound care, etc )  - Arrange for interpretive services to assist at discharge as needed  - Refer to Case Management Department for coordinating discharge planning if the patient needs post-hospital services based on physician/advanced practitioner order or complex needs related to functional status, cognitive ability, or social support system  Outcome: Progressing     Problem: Knowledge Deficit  Goal: Patient/family/caregiver demonstrates understanding of disease process, treatment plan, medications, and discharge instructions  Description: Complete learning assessment and assess knowledge base    Interventions:  - Provide teaching at level of understanding  - Provide teaching via preferred learning methods  Outcome: Progressing    times a day  - Out of bed for toileting  - Record patient progress and toleration of activity level   Outcome: Progressing

## 2023-01-19 NOTE — PROGRESS NOTES
Progress Note - Celestino Saunders 32 y o  male MRN: [id number]    Unit/Bed#: [address] [place] [place] [address] Encounter: [id number]        Assessment/Plan:  Abdominal pain  Patient presents abdominal pain and increased ileostomy output- complex PMH including IBD status post colectomy with J-pouch formation, proctitis, recent surgery 10/18 for diverting ileostomy complicated by prolapse and localized intra-abdominal infection   Plan for reconstruction of pouch in April at [facility], scheduled for April 20  -CT a/p on admission-No acute findings in the abdomen or pelvis  Right lower quadrant ileostomy   Reidentified fluid-filled J-pouch with persistent 3mild mucosal hyperemia which has been an ongoing finding  -CRP was last 6 7 in Nov 2022, now with elevation of 43 5, also noted to have leukocytosis on admission which now has resolved  -Fecal calprotectin on last admission was less than 16  -Patient with acute onset and some leukocytosis with subjective fever and chills, will rule out infectious etiology, stool studies ordered, neg for covid/flu/rsv  -Strict I and O's to measure ileostomy output  -Stool was negative for C  difficile as well as enteric panel, Giardia is pending and rotavirus was negative  -Patient requesting to have diet advanced, will start on clear liquids with toast and crackers      Subjective:   Patient is lying in bed  Reports that he is still having pain as well as nausea  He otherwise reports that he wants to try to eat something  Patient has been emptying his own ileostomy bag therefore output was not accurately documented  Objective:     Vitals: /63   Pulse 77   Temp (!) 97 4 °F (36 3 °C)   Resp 16   Ht 5' 9" (1 753 m)   Wt 82 kg (180 lb 12 4 oz)   SpO2 96%   BMI 26 70 kg/m²       Physical Exam:  Gen-no acute distress  Abd-soft positive bowel sounds nondistended, some vague tenderness diffusely to palpation  , + right lower quadrant ileostomy in place       Lab, Imaging and other studies:   Recent Results (from the past 72 hour(s))   CBC and differential    Collection Time: 01/18/23  8:59 AM   Result Value Ref Range    WBC 13 03 (H) 4 31 - 10 16 Thousand/uL    RBC 6 90 (H) 3 88 - 5 62 Million/uL    Hemoglobin 13 0 12 0 - 17 0 g/dL    Hematocrit 42 9 36 5 - 49 3 %    MCV 62 (L) 82 - 98 fL    MCH 18 8 (L) 26 8 - 34 3 pg    MCHC 30 3 (L) 31 4 - 37 4 g/dL    RDW 18 2 (H) 11 6 - 15 1 %    MPV 8 6 (L) 8 9 - 12 7 fL    Platelets 359 (H) 649 - 390 Thousands/uL    nRBC 0 /100 WBCs    Neutrophils Relative 86 (H) 43 - 75 %    Immat GRANS % 1 0 - 2 %    Lymphocytes Relative 5 (L) 14 - 44 %    Monocytes Relative 6 4 - 12 %    Eosinophils Relative 1 0 - 6 %    Basophils Relative 1 0 - 1 %    Neutrophils Absolute 11 38 (H) 1 85 - 7 62 Thousands/µL    Immature Grans Absolute 0 07 0 00 - 0 20 Thousand/uL    Lymphocytes Absolute 0 63 0 60 - 4 47 Thousands/µL    Monocytes Absolute 0 78 0 17 - 1 22 Thousand/µL    Eosinophils Absolute 0 08 0 00 - 0 61 Thousand/µL    Basophils Absolute 0 09 0 00 - 0 10 Thousands/µL   Comprehensive metabolic panel    Collection Time: 01/18/23  8:59 AM   Result Value Ref Range    Sodium 131 (L) 135 - 147 mmol/L    Potassium 3 5 3 5 - 5 3 mmol/L    Chloride 95 (L) 96 - 108 mmol/L    CO2 25 21 - 32 mmol/L    ANION GAP 11 4 - 13 mmol/L    BUN 11 5 - 25 mg/dL    Creatinine 1 26 0 60 - 1 30 mg/dL    Glucose 100 65 - 140 mg/dL    Calcium 9 6 8 3 - 10 1 mg/dL    AST 23 5 - 45 U/L    ALT 60 12 - 78 U/L    Alkaline Phosphatase 133 (H) 46 - 116 U/L    Total Protein 9 1 (H) 6 4 - 8 4 g/dL    Albumin 4 6 3 5 - 5 0 g/dL    Total Bilirubin 1 10 (H) 0 20 - 1 00 mg/dL    eGFR 76 ml/min/1 73sq m   Lipase    Collection Time: 01/18/23  8:59 AM   Result Value Ref Range    Lipase 74 73 - 393 u/L   Magnesium    Collection Time: 01/18/23  8:59 AM   Result Value Ref Range    Magnesium 1 6 1 6 - 2 6 mg/dL   Lactic acid    Collection Time: 01/18/23  8:59 AM   Result Value Ref Range    LACTIC ACID 1 2 0 5 - 2 0 mmol/L   Blood culture #1    Collection Time: 01/18/23  8:59 AM    Specimen: Arm, Right; Blood   Result Value Ref Range    Blood Culture Received in Microbiology Lab  Culture in Progress  Blood culture #2    Collection Time: 01/18/23  8:59 AM    Specimen: Arm, Right; Blood   Result Value Ref Range    Blood Culture Received in Microbiology Lab  Culture in Progress      TSH, 3rd generation with Free T4 reflex    Collection Time: 01/18/23  8:59 AM   Result Value Ref Range    TSH 3RD GENERATON 1 514 0 450 - 4 500 uIU/mL   C-reactive protein    Collection Time: 01/18/23  8:59 AM   Result Value Ref Range    CRP 43 5 (H) <3 0 mg/L   UA (URINE) with reflex to Scope    Collection Time: 01/18/23 11:43 AM   Result Value Ref Range    Color, UA Yellow     Clarity, UA Clear     Specific Gravity, UA 1 025 1 000 - 1 030    pH, UA 6 0 5 0, 5 5, 6 0, 6 5, 7 0, 7 5, 8 0, 8 5, 9 0    Leukocytes, UA Negative Negative    Nitrite, UA Negative Negative    Protein, UA Negative Negative mg/dl    Glucose, UA Negative Negative mg/dl    Ketones, UA Negative Negative mg/dl    Urobilinogen, UA 0 2 0 2, 1 0 E U /dl E U /dl    Bilirubin, UA Negative Negative    Occult Blood, UA Trace-Intact (A) Negative   Urine Microscopic    Collection Time: 01/18/23 11:43 AM   Result Value Ref Range    RBC, UA 0-1 None Seen, 0-1, 1-2, 2-4, 0-5 /hpf    WBC, UA None Seen None Seen, 0-1, 1-2, 0-5, 2-4 /hpf    Epithelial Cells None Seen None Seen, Occasional /hpf    Bacteria, UA Occasional None Seen, Occasional /hpf   Rotavirus antigen, stool    Collection Time: 01/18/23  5:05 PM   Result Value Ref Range    Rotavirus Negative Negative   Occult blood 1-3, stool    Collection Time: 01/18/23  5:05 PM   Result Value Ref Range    Fecal Occult Blood Diagnostic Positive (A) Negative    Fecal Occult Blood Diagnostic 2 Test not performed Negative    Fecal Occult Blood Diagnostic 3 Test not performed Negative   COVID/FLU/RSV    Collection Time: 01/18/23  5:05 PM    Specimen: Nose; Nares Result Value Ref Range    SARS-CoV-2 Negative Negative    INFLUENZA A PCR Negative Negative    INFLUENZA B PCR Negative Negative    RSV PCR Negative Negative   Comprehensive metabolic panel    Collection Time: 01/19/23  5:29 AM   Result Value Ref Range    Sodium 137 135 - 147 mmol/L    Potassium 3 4 (L) 3 5 - 5 3 mmol/L    Chloride 103 96 - 108 mmol/L    CO2 22 21 - 32 mmol/L    ANION GAP 12 4 - 13 mmol/L    BUN 8 5 - 25 mg/dL    Creatinine 1 08 0 60 - 1 30 mg/dL    Glucose 75 65 - 140 mg/dL    Calcium 8 5 8 3 - 10 1 mg/dL    Corrected Calcium 9 2 8 3 - 10 1 mg/dL    AST 19 5 - 45 U/L    ALT 32 12 - 78 U/L    Alkaline Phosphatase 89 46 - 116 U/L    Total Protein 6 2 (L) 6 4 - 8 4 g/dL    Albumin 3 1 (L) 3 5 - 5 0 g/dL    Total Bilirubin 0 74 0 20 - 1 00 mg/dL    eGFR 92 ml/min/1 73sq m   CBC and differential    Collection Time: 01/19/23  5:29 AM   Result Value Ref Range    WBC 6 38 4 31 - 10 16 Thousand/uL    RBC 5 12 3 88 - 5 62 Million/uL    Hemoglobin 9 7 (L) 12 0 - 17 0 g/dL    Hematocrit 32 2 (L) 36 5 - 49 3 %    MCV 63 (L) 82 - 98 fL    MCH 18 9 (L) 26 8 - 34 3 pg    MCHC 30 1 (L) 31 4 - 37 4 g/dL    RDW 16 1 (H) 11 6 - 15 1 %    MPV 8 5 (L) 8 9 - 12 7 fL    Platelets 170 262 - 276 Thousands/uL    nRBC 0 /100 WBCs    Neutrophils Relative 65 43 - 75 %    Immat GRANS % 0 0 - 2 %    Lymphocytes Relative 17 14 - 44 %    Monocytes Relative 13 (H) 4 - 12 %    Eosinophils Relative 4 0 - 6 %    Basophils Relative 1 0 - 1 %    Neutrophils Absolute 4 15 1 85 - 7 62 Thousands/µL    Immature Grans Absolute 0 02 0 00 - 0 20 Thousand/uL    Lymphocytes Absolute 1 06 0 60 - 4 47 Thousands/µL    Monocytes Absolute 0 83 0 17 - 1 22 Thousand/µL    Eosinophils Absolute 0 23 0 00 - 0 61 Thousand/µL    Basophils Absolute 0 09 0 00 - 0 10 Thousands/µL

## 2023-01-19 NOTE — CASE MANAGEMENT
Case Management Assessment & Discharge Planning Note    Patient name Sherilyn Duane  Location [address] [facility] 68 220 MRN [id number]  : 1995 Date 2023       Current Admission Date: 2023  Current Admission Diagnosis:Nausea vomiting and diarrhea   Patient Active Problem List    Diagnosis Date Noted   • Electrolyte abnormality 2023   • Nausea vomiting and diarrhea 2023   • Chronic pain syndrome 2022   • At high risk for cardiac arrhythmia 10/19/2022   • Peristomal dermatitis 10/19/2022   • Ileostomy present ([facility] ) 10/15/2022   • Elevated C-reactive protein (CRP) 2022   • Intractable abdominal pain 2022   • Rectal obstruction 2022   • Epigastric abdominal pain 2022   • Abnormal CT scan of lung 2022   • Presumed AV endocarditis 2022   • Anemia 2022   • Polymicrobial after streptococcal bacteremia 2022   • Enteritis 2022   • SIRS (systemic inflammatory response syndrome) ([facility] ) 2022   • Ankylosing spondylitis ([facility] ) 2022   • Elevated LFTs 2022   • Arm and leg movements, uncontrollable 2021   • Seizure-like activity ([facility] ) 2021   • Arthralgia 09/15/2020   • Sacroiliac joint dysfunction of right side 2020   • Left groin pain 2019   • Left-sided low back pain without sciatica    • Complications of intestinal pouch ([facility] ) 2019   • History of ulcerative colitis 2019   • CAR (generalized anxiety disorder) 10/19/2018   • BMI 26 0-26 9,adult 10/19/2018   • Abdominal pain 2018   • 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[facility] ) 2018   • Stricture of rectum 2018      LOS (days): 0  Geometric Mean LOS (GMLOS) (days):   Days to GMLOS:     OBJECTIVE:              Current admission status: Observation       Preferred Pharmacy:   [address]  [address] [facility] [address]  Phone: [phone] Fax: [phone] primary Care Provider: Corina Garcia DO    Primary Insurance: BLUE CROSS  Secondary Insurance:     ASSESSMENT:  Active Health Care Proxies     [place], [place] Representative - Father   Primary Phone: [phone] (Mobile)  Home Phone: [phone]            Readmission Root Cause  30 Day Readmission: No    Patient Information  Admitted from[de-identified] Home  Mental Status: Alert  During Assessment patient was accompanied by: Not accompanied during assessment  Assessment information provided by[de-identified] Patient  Primary Caregiver: Self  Support Systems: Self, Parent, Family members  [place] of Residence: [address] [place] [address] do you live in?: [place]  In the last 12 months, was there a time when you were not able to pay the mortgage or rent on time?: No  In the last 12 months, how many places have you lived?: 1  In the last 12 months, was there a time when you did not have a steady place to sleep or slept in a shelter (including now)?: No  Homeless/housing insecurity resource given?: No  Living Arrangements: Lives w/ Extended Family    Activities of Daily Living Prior to Admission  Functional Status: Independent  Completes ADLs independently?: Yes  Ambulates independently?: Yes  Does patient use assisted devices?: No  Does patient currently own DME?: No  Does patient have a history of Outpatient Therapy (PT/OT)?: No  Does the patient have a history of Short-Term Rehab?: No  Does patient have a history of HHC?: No  Does patient currently have [address]?: No    Patient Information Continued  Income Source: Employed  Does patient have prescription coverage?: Yes  Within the past 12 months, you worried that your food would run out before you got the money to buy more : Never true  Within the past 12 months, the food you bought just didn't last and you didn't have money to get more : Never true  Food insecurity resource given?: No  Does patient receive dialysis treatments?: No  Does patient have a history of substance abuse?: No  Does patient have a history of Mental Health Diagnosis?: Yes (CAR)  Has patient received inpatient treatment related to mental health in the last 2 years?: No    PHQ 2/9 Screening   Reviewed PHQ 2/9 Depression Screening Score?: No    Means of Transportation  Means of Transport to Appts[de-identified] Drives Self  In the past 12 months, has lack of transportation kept you from medical appointments or from getting medications?: No  In the past 12 months, has lack of transportation kept you from meetings, work, or from getting things needed for daily living?: No  Was application for public transport provided?: No    DISCHARGE DETAILS:    Discharge planning discussed with[de-identified] Patient  Freedom of Choice: Yes     CM contacted family/caregiver?: No- see comments (Declined call to family )  Were Treatment Team discharge recommendations reviewed with patient/caregiver?: Yes  Did patient/caregiver verbalize understanding of patient care needs?: Yes  Were patient/caregiver advised of the risks associated with not following Treatment Team discharge recommendations?: Yes    [address]         Is the patient interested in [facility] at discharge?: No    DME Referral Provided  Referral made for DME?: No    Other Referral/Resources/Interventions Provided:  Interventions: None Indicated    Would you like to participate in our [address] service program?  : No - Declined    Treatment Team Recommendation: Home  Discharge Destination Plan[de-identified] Home  Transport at Discharge : Self

## 2023-01-19 NOTE — PLAN OF CARE
Problem: PAIN - ADULT  Goal: Verbalizes/displays adequate comfort level or baseline comfort level  Description: Interventions:  - Encourage patient to monitor pain and request assistance  - Assess pain using appropriate pain scale  - Administer analgesics based on type and severity of pain and evaluate response  - Implement non-pharmacological measures as appropriate and evaluate response  - Consider cultural and social influences on pain and pain management  - Notify physician/advanced practitioner if interventions unsuccessful or patient reports new pain  Outcome: Progressing     Problem: INFECTION - ADULT  Goal: Absence or prevention of progression during hospitalization  Description: INTERVENTIONS:  - Assess and monitor for signs and symptoms of infection  - Monitor lab/diagnostic results  - Monitor all insertion sites, i e  indwelling lines, tubes, and drains  - Monitor endotracheal if appropriate and nasal secretions for changes in amount and color  - [place] appropriate cooling/warming therapies per order  - Administer medications as ordered  - Instruct and encourage patient and family to use good hand hygiene technique  - Identify and instruct in appropriate isolation precautions for identified infection/condition  Outcome: Progressing  Goal: Absence of fever/infection during neutropenic period  Description: INTERVENTIONS:  - Monitor WBC    Outcome: Progressing     Problem: SAFETY ADULT  Goal: Patient will remain free of falls  Description: INTERVENTIONS:  - Educate patient/family on patient safety including physical limitations  - Instruct patient to call for assistance with activity   - Consult OT/PT to assist with strengthening/mobility   - Keep Call bell within reach  - Keep bed low and locked with side rails adjusted as appropriate  - Keep care items and personal belongings within reach  - Initiate and maintain comfort rounds  - Make Fall Risk Sign visible to staff  - Offer Toileting every 2 Hours, in advance of need  - Initiate/Maintain bed alarm  - Obtain necessary fall risk management equipment: yellow socks  - Apply yellow socks and bracelet for high fall risk patients  - Consider moving patient to room near nurses station  Outcome: Progressing  Goal: Maintain or return to baseline ADL function  Description: INTERVENTIONS:  -  Assess patient's ability to carry out ADLs; assess patient's baseline for ADL function and identify physical deficits which impact ability to perform ADLs (bathing, care of mouth/teeth, toileting, grooming, dressing, etc )  - Assess/evaluate cause of self-care deficits   - Assess range of motion  - Assess patient's mobility; develop plan if impaired  - Assess patient's need for assistive devices and provide as appropriate  - Encourage maximum independence but intervene and supervise when necessary  - Involve family in performance of ADLs  - Assess for home care needs following discharge   - Consider OT consult to assist with ADL evaluation and planning for discharge  - Provide patient education as appropriate  Outcome: Progressing  Goal: Maintains/Returns to pre admission functional level  Description: INTERVENTIONS:  - Perform BMAT or MOVE assessment daily    - Set and communicate daily mobility goal to care team and patient/family/caregiver  - Collaborate with rehabilitation services on mobility goals if consulted  - Perform Range of Motion 3 times a day  - Reposition patient every 2 hours    - Dangle patient 3 times a day  - Stand patient 3 times a day  - Ambulate patient 3 times a day  - Out of bed to chair 3 times a day   - Out of bed for meals 3 times a day  - Out of bed for toileting  - Record patient progress and toleration of activity level   Outcome: Progressing     Problem: DISCHARGE PLANNING  Goal: Discharge to home or other facility with appropriate resources  Description: INTERVENTIONS:  - Identify barriers to discharge w/patient and caregiver  - Arrange for needed discharge resources and transportation as appropriate  - Identify discharge learning needs (meds, wound care, etc )  - Arrange for interpretive services to assist at discharge as needed  - Refer to Case Management Department for coordinating discharge planning if the patient needs post-hospital services based on physician/advanced practitioner order or complex needs related to functional status, cognitive ability, or social support system  Outcome: Progressing     Problem: Knowledge Deficit  Goal: Patient/family/caregiver demonstrates understanding of disease process, treatment plan, medications, and discharge instructions  Description: Complete learning assessment and assess knowledge base    Interventions:  - Provide teaching at level of understanding  - Provide teaching via preferred learning methods  Outcome: Progressing

## 2023-01-19 NOTE — PROGRESS NOTES
Marta U  64   Progress Note - Randall Mahajan 1995, 32 y o  male MRN: [id number]  Unit/Bed#: [address] [place] [address] Encounter: [id number]  Primary Care Provider: Saman Woodall,    Date and time admitted to hospital: 1/18/2023  8:36 AM    Abdominal pain  Assessment & Plan  Recurrent, unresolved, patient followed by [facility] specialist in [facility]    · CT abdomen:No acute findings in the abdomen or pelvis  Right lower quadrant ileostomy  Reidentified fluid-filled J-pouch with persistent 3mild mucosal hyperemia    · Pain management  · GI consulted recs: CRP was last 6 7 in Nov 2022, now with elevation of 43 5, also noted to have leukocytosis on admission  which now has resolved   -Fecal calprotectin on last admission was less than 16   -Patient with acute onset and some leukocytosis with subjective fever and chills, will rule out infectious  etiology, stool studies ordered, neg for covid/flu/rsv   -Strict I and O's to measure ileostomy output   -Stool was negative for C  difficile as well as enteric panel, Giardia is pending and rotavirus was negative   -Patient requesting to have diet advanced, will start on clear liquids with toast and crackers      Electrolyte abnormality  Assessment & Plan  Likely secondary to uncontrolled nausea vomiting, replenish sodium replenish sodium and chloride    · Replenish sodium and chloride Na/Cl  · IVF   · will trend      Chronic pain syndrome  Assessment & Plan  Recommend outpatient chronic pain management, very high tolerance and need for high pain medication    · Currently on Dilaudid   · patient refused Toradol        Ileostomy present [facility])  Assessment & Plan  Managed by [facility]    Patient reports changing ostomy bag every 3 to 4 days    · Revised 3 months ago, not nonproblematic  · I's and O's on output    Elevated C-reactive protein (CRP)  Assessment & Plan  Chronic,     · CRP-43 5 > trend if need to  · Likely multifactorial from infection versus, underlying autoimmune condition    SIRS (systemic inflammatory response syndrome) ([facility])  Assessment & Plan  Evident by leukocytosis, tachycardia    · CT abdomen negative for acute findings  · CXR-pending  · Possible GI source  · Blood cultures-no growth 24 hours  · stool cultures-negative for bacteria, positive for occult blood  · UA--WNL  · Lactic acid-WNL/Pro-Rubin-pending  · Levaquin and metronidazole empirically- continue per GI recommendations  · Continue to monitor for signs of infecion    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,    · Followed by [facility], outpt  · GI consulted    CAR (generalized anxiety disorder)  Assessment & Plan  Chronic,     · Restarted home duloxetine  · As needed low-dose for anxiety          * Nausea vomiting and diarrhea  Assessment & Plan  PTA, unresolved    · IVF normal saline   · Stool cultures-pending  · Zofran, Immodium prn          Subjective:   Patient seen and examined at bedside with family present  Patient reported decreased pain, Patient denied any chest pain, palpitations, sob, abdominal pain, nausea, vomiting, constipation, diarrhea or dizziness  Objective:   Vitals: Blood pressure 133/88, pulse 95, temperature 97 6 °F (36 4 °C), resp  rate 18, height 5' 9" (1 753 m), weight 82 kg (180 lb 12 4 oz), SpO2 95 %  ,Body mass index is 26 7 kg/m²  Intake/Output Summary (Last 24 hours) at 1/19/2023 1708  Last data filed at 1/19/2023 1401  Gross per 24 hour   Intake --   Output 400 ml   Net -400 ml       Physical Exam:   Physical Exam  Vitals reviewed  Constitutional:       General: He is not in acute distress  Appearance: Normal appearance  HENT:      Head: Atraumatic  Nose: No congestion  Eyes:      General: No scleral icterus  Pupils: Pupils are equal, round, and reactive to light  Cardiovascular:      Rate and Rhythm: Normal rate  Heart sounds: No murmur heard  Pulmonary:      Effort: No respiratory distress  Breath sounds: No wheezing, rhonchi or rales  Abdominal:      Palpations: Abdomen is soft  Tenderness: There is abdominal tenderness  There is no guarding  Comments: Right Carlito umbilical ostomy bag, margins CDI   Musculoskeletal:         General: No swelling or deformity  Normal range of motion  Cervical back: No tenderness  Right lower leg: No edema  Left lower leg: No edema  Right foot: Normal range of motion  Left foot: Normal range of motion  Feet:      Right foot:      Skin integrity: No ulcer, skin breakdown or callus  Left foot:      Skin integrity: No ulcer, skin breakdown or callus  Skin:     General: Skin is warm  Capillary Refill: Capillary refill takes less than 2 seconds  Findings: No lesion or rash  Neurological:      Mental Status: He is oriented to person, place, and time  Cranial Nerves: No cranial nerve deficit  Coordination: Coordination normal    Psychiatric:         Mood and Affect: Mood normal          Thought Content:  Thought content normal                      Recent Results (from the past 96 hour(s))   CBC and differential    Collection Time: 01/18/23  8:59 AM   Result Value Ref Range    WBC 13 03 (H) 4 31 - 10 16 Thousand/uL    RBC 6 90 (H) 3 88 - 5 62 Million/uL    Hemoglobin 13 0 12 0 - 17 0 g/dL    Hematocrit 42 9 36 5 - 49 3 %    MCV 62 (L) 82 - 98 fL    MCH 18 8 (L) 26 8 - 34 3 pg    MCHC 30 3 (L) 31 4 - 37 4 g/dL    RDW 18 2 (H) 11 6 - 15 1 %    MPV 8 6 (L) 8 9 - 12 7 fL    Platelets 644 (H) 703 - 390 Thousands/uL    nRBC 0 /100 WBCs    Neutrophils Relative 86 (H) 43 - 75 %    Immat GRANS % 1 0 - 2 %    Lymphocytes Relative 5 (L) 14 - 44 %    Monocytes Relative 6 4 - 12 %    Eosinophils Relative 1 0 - 6 %    Basophils Relative 1 0 - 1 %    Neutrophils Absolute 11 38 (H) 1 85 - 7 62 Thousands/µL    Immature Grans Absolute 0 07 0 00 - 0 20 Thousand/uL    Lymphocytes Absolute 0 63 0 60 - 4 47 Thousands/µL    Monocytes Absolute 0 78 0 17 - 1 22 Thousand/µL    Eosinophils Absolute 0 08 0 00 - 0 61 Thousand/µL    Basophils Absolute 0 09 0 00 - 0 10 Thousands/µL   Comprehensive metabolic panel    Collection Time: 01/18/23  8:59 AM   Result Value Ref Range    Sodium 131 (L) 135 - 147 mmol/L    Potassium 3 5 3 5 - 5 3 mmol/L    Chloride 95 (L) 96 - 108 mmol/L    CO2 25 21 - 32 mmol/L    ANION GAP 11 4 - 13 mmol/L    BUN 11 5 - 25 mg/dL    Creatinine 1 26 0 60 - 1 30 mg/dL    Glucose 100 65 - 140 mg/dL    Calcium 9 6 8 3 - 10 1 mg/dL    AST 23 5 - 45 U/L    ALT 60 12 - 78 U/L    Alkaline Phosphatase 133 (H) 46 - 116 U/L    Total Protein 9 1 (H) 6 4 - 8 4 g/dL    Albumin 4 6 3 5 - 5 0 g/dL    Total Bilirubin 1 10 (H) 0 20 - 1 00 mg/dL    eGFR 76 ml/min/1 73sq m   Lipase    Collection Time: 01/18/23  8:59 AM   Result Value Ref Range    Lipase 74 73 - 393 u/L   Magnesium    Collection Time: 01/18/23  8:59 AM   Result Value Ref Range    Magnesium 1 6 1 6 - 2 6 mg/dL   Lactic acid    Collection Time: 01/18/23  8:59 AM   Result Value Ref Range    LACTIC ACID 1 2 0 5 - 2 0 mmol/L   Blood culture #1    Collection Time: 01/18/23  8:59 AM    Specimen: Arm, Right; Blood   Result Value Ref Range    Blood Culture No Growth at 24 hrs  Blood culture #2    Collection Time: 01/18/23  8:59 AM    Specimen: Arm, Right; Blood   Result Value Ref Range    Blood Culture No Growth at 24 hrs      TSH, 3rd generation with Free T4 reflex    Collection Time: 01/18/23  8:59 AM   Result Value Ref Range    TSH 3RD GENERATON 1 514 0 450 - 4 500 uIU/mL   C-reactive protein    Collection Time: 01/18/23  8:59 AM   Result Value Ref Range    CRP 43 5 (H) <3 0 mg/L   UA (URINE) with reflex to Scope    Collection Time: 01/18/23 11:43 AM   Result Value Ref Range    Color, UA Yellow     Clarity, UA Clear     Specific Gravity, UA 1 025 1 000 - 1 030    pH, UA 6 0 5 0, 5 5, 6 0, 6 5, 7 0, 7 5, 8 0, 8 5, 9 0    Leukocytes, UA Negative Negative    Nitrite, UA Negative Negative Protein, UA Negative Negative mg/dl    Glucose, UA Negative Negative mg/dl    Ketones, UA Negative Negative mg/dl    Urobilinogen, UA 0 2 0 2, 1 0 E U /dl E U /dl    Bilirubin, UA Negative Negative    Occult Blood, UA Trace-Intact (A) Negative   Urine Microscopic    Collection Time: 01/18/23 11:43 AM   Result Value Ref Range    RBC, UA 0-1 None Seen, 0-1, 1-2, 2-4, 0-5 /hpf    WBC, UA None Seen None Seen, 0-1, 1-2, 0-5, 2-4 /hpf    Epithelial Cells None Seen None Seen, Occasional /hpf    Bacteria, UA Occasional None Seen, Occasional /hpf   Rotavirus antigen, stool    Collection Time: 01/18/23  5:05 PM   Result Value Ref Range    Rotavirus Negative Negative   Occult blood 1-3, stool    Collection Time: 01/18/23  5:05 PM   Result Value Ref Range    Fecal Occult Blood Diagnostic Positive (A) Negative    Fecal Occult Blood Diagnostic 2 Test not performed Negative    Fecal Occult Blood Diagnostic 3 Test not performed Negative   Stool Enteric Bacterial Panel by PCR    Collection Time: 01/18/23  5:05 PM    Specimen: Per Stoma; Stool   Result Value Ref Range    Salmonella sp PCR None Detected None Detected    Shigella sp/Enteroinvasive E  coli (EIEC) PCR None Detected None Detected    Campylobacter sp (jejuni and coli) PCR None Detected None Detected    Shiga toxin 1/Shiga toxin 2 genes PCR None Detected None Detected   Clostridium difficile toxin by PCR with EIA    Collection Time: 01/18/23  5:05 PM    Specimen: Per Stoma; Stool   Result Value Ref Range    C difficile toxin by PCR Negative Negative   COVID/FLU/RSV    Collection Time: 01/18/23  5:05 PM    Specimen: Nose; Nares   Result Value Ref Range    SARS-CoV-2 Negative Negative    INFLUENZA A PCR Negative Negative    INFLUENZA B PCR Negative Negative    RSV PCR Negative Negative   Comprehensive metabolic panel    Collection Time: 01/19/23  5:29 AM   Result Value Ref Range    Sodium 137 135 - 147 mmol/L    Potassium 3 4 (L) 3 5 - 5 3 mmol/L    Chloride 103 96 - 108 mmol/L    CO2 22 21 - 32 mmol/L    ANION GAP 12 4 - 13 mmol/L    BUN 8 5 - 25 mg/dL    Creatinine 1 08 0 60 - 1 30 mg/dL    Glucose 75 65 - 140 mg/dL    Calcium 8 5 8 3 - 10 1 mg/dL    Corrected Calcium 9 2 8 3 - 10 1 mg/dL    AST 19 5 - 45 U/L    ALT 32 12 - 78 U/L    Alkaline Phosphatase 89 46 - 116 U/L    Total Protein 6 2 (L) 6 4 - 8 4 g/dL    Albumin 3 1 (L) 3 5 - 5 0 g/dL    Total Bilirubin 0 74 0 20 - 1 00 mg/dL    eGFR 92 ml/min/1 73sq m   CBC and differential    Collection Time: 01/19/23  5:29 AM   Result Value Ref Range    WBC 6 38 4 31 - 10 16 Thousand/uL    RBC 5 12 3 88 - 5 62 Million/uL    Hemoglobin 9 7 (L) 12 0 - 17 0 g/dL    Hematocrit 32 2 (L) 36 5 - 49 3 %    MCV 63 (L) 82 - 98 fL    MCH 18 9 (L) 26 8 - 34 3 pg    MCHC 30 1 (L) 31 4 - 37 4 g/dL    RDW 16 1 (H) 11 6 - 15 1 %    MPV 8 5 (L) 8 9 - 12 7 fL    Platelets 302 803 - 109 Thousands/uL    nRBC 0 /100 WBCs    Neutrophils Relative 65 43 - 75 %    Immat GRANS % 0 0 - 2 %    Lymphocytes Relative 17 14 - 44 %    Monocytes Relative 13 (H) 4 - 12 %    Eosinophils Relative 4 0 - 6 %    Basophils Relative 1 0 - 1 %    Neutrophils Absolute 4 15 1 85 - 7 62 Thousands/µL    Immature Grans Absolute 0 02 0 00 - 0 20 Thousand/uL    Lymphocytes Absolute 1 06 0 60 - 4 47 Thousands/µL    Monocytes Absolute 0 83 0 17 - 1 22 Thousand/µL    Eosinophils Absolute 0 23 0 00 - 0 61 Thousand/µL    Basophils Absolute 0 09 0 00 - 0 10 Thousands/µL         All medications, consultant notes and nursing records reviewed  Lab, EKG, Telemetry, Imaging and other studies: I have personally reviewed pertinent reports  VTE Pharmacologic Prophylaxis: Enoxaparin (Lovenox)  VTE Mechanical Prophylaxis: sequential compression device    PT/OT/ST reviewed  Discussed with   Plan was coordinated with Nursing staff & Case management  Shared decision making care plan was discussed with family  Total time >30 minutes with >50 % of spent in coordination & counseling        Discharge time (if applicable) :    Portions of the record may have been created with voice recognition software  Occasional wrong word or "sound a like" substitutions may have occurred due to the inherent limitations of voice recognition software  Read the chart carefully and recognize, using context, where substitutions have occurred

## 2023-01-20 ENCOUNTER — ANESTHESIA EVENT (INPATIENT)
Dept: PERIOP | Facility: HOSPITAL | Age: 28
End: 2023-01-20

## 2023-01-20 ENCOUNTER — APPOINTMENT (OUTPATIENT)
Dept: PERIOP | Facility: HOSPITAL | Age: 28
End: 2023-01-20

## 2023-01-20 ENCOUNTER — ANESTHESIA (INPATIENT)
Dept: PERIOP | Facility: HOSPITAL | Age: 28
End: 2023-01-20

## 2023-01-20 LAB
ALBUMIN SERPL BCP-MCNC: 2.9 G/DL (ref 3.5–5)
ALP SERPL-CCNC: 84 U/L (ref 46–116)
ALT SERPL W P-5'-P-CCNC: 26 U/L (ref 12–78)
ANION GAP SERPL CALCULATED.3IONS-SCNC: 8 MMOL/L (ref 4–13)
AST SERPL W P-5'-P-CCNC: 20 U/L (ref 5–45)
BASOPHILS # BLD AUTO: 0.06 THOUSANDS/ÂΜL (ref 0–0.1)
BASOPHILS NFR BLD AUTO: 1 % (ref 0–1)
BILIRUB SERPL-MCNC: 0.48 MG/DL (ref 0.2–1)
BUN SERPL-MCNC: 6 MG/DL (ref 5–25)
CALCIUM ALBUM COR SERPL-MCNC: 9.3 MG/DL (ref 8.3–10.1)
CALCIUM SERPL-MCNC: 8.4 MG/DL (ref 8.3–10.1)
CHLORIDE SERPL-SCNC: 104 MMOL/L (ref 96–108)
CO2 SERPL-SCNC: 24 MMOL/L (ref 21–32)
CREAT SERPL-MCNC: 0.91 MG/DL (ref 0.6–1.3)
EOSINOPHIL # BLD AUTO: 0.42 THOUSAND/ÂΜL (ref 0–0.61)
EOSINOPHIL NFR BLD AUTO: 6 % (ref 0–6)
ERYTHROCYTE [DISTWIDTH] IN BLOOD BY AUTOMATED COUNT: 16 % (ref 11.6–15.1)
GFR SERPL CREATININE-BSD FRML MDRD: 113 ML/MIN/1.73SQ M
GLUCOSE SERPL-MCNC: 88 MG/DL (ref 65–140)
HCT VFR BLD AUTO: 30.5 % (ref 36.5–49.3)
HGB BLD-MCNC: 9.2 G/DL (ref 12–17)
IMM GRANULOCYTES # BLD AUTO: 0.01 THOUSAND/UL (ref 0–0.2)
IMM GRANULOCYTES NFR BLD AUTO: 0 % (ref 0–2)
LYMPHOCYTES # BLD AUTO: 1.06 THOUSANDS/ÂΜL (ref 0.6–4.47)
LYMPHOCYTES NFR BLD AUTO: 16 % (ref 14–44)
MCH RBC QN AUTO: 18.9 PG (ref 26.8–34.3)
MCHC RBC AUTO-ENTMCNC: 30.2 G/DL (ref 31.4–37.4)
MCV RBC AUTO: 63 FL (ref 82–98)
MONOCYTES # BLD AUTO: 0.95 THOUSAND/ÂΜL (ref 0.17–1.22)
MONOCYTES NFR BLD AUTO: 14 % (ref 4–12)
NEUTROPHILS # BLD AUTO: 4.23 THOUSANDS/ÂΜL (ref 1.85–7.62)
NEUTS SEG NFR BLD AUTO: 63 % (ref 43–75)
NRBC BLD AUTO-RTO: 0 /100 WBCS
PLATELET # BLD AUTO: 248 THOUSANDS/UL (ref 149–390)
PMV BLD AUTO: 8.5 FL (ref 8.9–12.7)
POTASSIUM SERPL-SCNC: 3.7 MMOL/L (ref 3.5–5.3)
PROT SERPL-MCNC: 6 G/DL (ref 6.4–8.4)
RBC # BLD AUTO: 4.86 MILLION/UL (ref 3.88–5.62)
SODIUM SERPL-SCNC: 136 MMOL/L (ref 135–147)
WBC # BLD AUTO: 6.73 THOUSAND/UL (ref 4.31–10.16)

## 2023-01-20 PROCEDURE — 0DB78ZX EXCISION OF STOMACH, PYLORUS, VIA NATURAL OR ARTIFICIAL OPENING ENDOSCOPIC, DIAGNOSTIC: ICD-10-PCS | Performed by: INTERNAL MEDICINE

## 2023-01-20 PROCEDURE — 0DB98ZX EXCISION OF DUODENUM, VIA NATURAL OR ARTIFICIAL OPENING ENDOSCOPIC, DIAGNOSTIC: ICD-10-PCS | Performed by: INTERNAL MEDICINE

## 2023-01-20 RX ORDER — LIDOCAINE HYDROCHLORIDE 20 MG/ML
INJECTION, SOLUTION EPIDURAL; INFILTRATION; INTRACAUDAL; PERINEURAL AS NEEDED
Status: DISCONTINUED | OUTPATIENT
Start: 2023-01-20 | End: 2023-01-20

## 2023-01-20 RX ORDER — PROPOFOL 10 MG/ML
INJECTION, EMULSION INTRAVENOUS AS NEEDED
Status: DISCONTINUED | OUTPATIENT
Start: 2023-01-20 | End: 2023-01-20

## 2023-01-20 RX ORDER — SODIUM CHLORIDE, SODIUM LACTATE, POTASSIUM CHLORIDE, CALCIUM CHLORIDE 600; 310; 30; 20 MG/100ML; MG/100ML; MG/100ML; MG/100ML
INJECTION, SOLUTION INTRAVENOUS CONTINUOUS PRN
Status: DISCONTINUED | OUTPATIENT
Start: 2023-01-20 | End: 2023-01-20

## 2023-01-20 RX ADMIN — PANTOPRAZOLE SODIUM 40 MG: 40 INJECTION, POWDER, FOR SOLUTION INTRAVENOUS at 09:13

## 2023-01-20 RX ADMIN — PROPOFOL 150 MG: 10 INJECTION, EMULSION INTRAVENOUS at 13:42

## 2023-01-20 RX ADMIN — DULOXETINE HYDROCHLORIDE 60 MG: 60 CAPSULE, DELAYED RELEASE ORAL at 15:53

## 2023-01-20 RX ADMIN — ONDANSETRON 4 MG: 2 INJECTION INTRAMUSCULAR; INTRAVENOUS at 07:37

## 2023-01-20 RX ADMIN — SODIUM CHLORIDE, SODIUM LACTATE, POTASSIUM CHLORIDE, AND CALCIUM CHLORIDE: .6; .31; .03; .02 INJECTION, SOLUTION INTRAVENOUS at 13:08

## 2023-01-20 RX ADMIN — HYDROMORPHONE HYDROCHLORIDE 1 MG: 1 INJECTION, SOLUTION INTRAMUSCULAR; INTRAVENOUS; SUBCUTANEOUS at 21:17

## 2023-01-20 RX ADMIN — LIDOCAINE HYDROCHLORIDE 100 MG: 20 INJECTION, SOLUTION EPIDURAL; INFILTRATION; INTRACAUDAL; PERINEURAL at 13:42

## 2023-01-20 RX ADMIN — HYDROMORPHONE HYDROCHLORIDE 1 MG: 1 INJECTION, SOLUTION INTRAMUSCULAR; INTRAVENOUS; SUBCUTANEOUS at 12:36

## 2023-01-20 RX ADMIN — PROPOFOL 50 MG: 10 INJECTION, EMULSION INTRAVENOUS at 13:46

## 2023-01-20 RX ADMIN — SODIUM CHLORIDE 125 ML/HR: 0.9 INJECTION, SOLUTION INTRAVENOUS at 01:00

## 2023-01-20 RX ADMIN — ENOXAPARIN SODIUM 40 MG: 40 INJECTION SUBCUTANEOUS at 15:53

## 2023-01-20 RX ADMIN — HYDROMORPHONE HYDROCHLORIDE 1 MG: 1 INJECTION, SOLUTION INTRAMUSCULAR; INTRAVENOUS; SUBCUTANEOUS at 03:16

## 2023-01-20 RX ADMIN — HYDROMORPHONE HYDROCHLORIDE 1 MG: 1 INJECTION, SOLUTION INTRAMUSCULAR; INTRAVENOUS; SUBCUTANEOUS at 16:58

## 2023-01-20 RX ADMIN — HYDROMORPHONE HYDROCHLORIDE 1 MG: 1 INJECTION, SOLUTION INTRAMUSCULAR; INTRAVENOUS; SUBCUTANEOUS at 07:37

## 2023-01-20 RX ADMIN — SODIUM CHLORIDE 125 ML/HR: 0.9 INJECTION, SOLUTION INTRAVENOUS at 06:46

## 2023-01-20 NOTE — ASSESSMENT & PLAN NOTE
Recommended outpatient pain management referral for management of chronic abd pain but patient declined  · On Dilaudid here  · patient refused Toradol

## 2023-01-20 NOTE — ASSESSMENT & PLAN NOTE
(Hyponatremia in the setting of nausea and vomiting as evidenced by Na 131, treated with NS bolus x3 and repeat BMP's)  REpleted potassium as well

## 2023-01-20 NOTE — UTILIZATION REVIEW
Continued Stay Review    Date: 1/20/2023                          Current Patient Class:  Inpatient   Current Level of Care:  Med surg     HPI:27 y o  male initially admitted on 1/18 UC exacerbation  C/o abd pain, N&V  Assessment/Plan: 1/20/23 - He reports decreased pain, He is requesting to have diet advanced, after  EGD  to start clears with toast & crackers  Continue IVF's, zofran prn, monitor and replete electrolytes  Strict I & O's to measure ileostomy output  Stool negative for C diff as well as enteric panel, Giardia pending and rotovirus was negative  EGD 1/20 - IMPRESSION:  Antral erosions and erythema  Biopsies obtained  Proximal duodenal erosions and erythema  Biopsies obtained     RECOMMENDATION:    Await pathology results     Avoid NSAIDs      Continue PPI         Vital Signs:  Date/Time Temp Pulse Resp BP MAP (mmHg) SpO2 O2 Device Patient Position - Orthostatic VS   01/20/23 1415 -- 72 20 113/66 -- 96 % None (Room air) --   01/20/23 1405 -- 72 18 114/62 -- 95 % None (Room air) --   01/20/23 1355 -- 71 18 108/58 -- 96 % None (Room air) --   01/20/23 1253 -- 88 20 127/83 -- 97 % None (Room air) --     Date/Time Temp Pulse Resp BP MAP (mmHg) SpO2 O2 Device Patient Position - Orthostatic VS   01/20/23 0737 -- -- -- -- -- 98 % None (Room air) --   01/20/23 07:26:51 97 6 °F (36 4 °C) 77 18 118/73 88 97 % -- Lying   01/19/23 22:53:28 97 8 °F (36 6 °C) 89 16 122/82 95 96 % -- --   01/19/23 20:22:40 97 4 °F (36 3 °C) Abnormal  98 16 128/86 100 96 % -- --   01/19/23 13:55:21 97 6 °F (36 4 °C) 95 -- 133/88 103 95 % -- --   01/19/23 07:32:55 97 4 °F (36 3 °C) Abnormal  77 18 100/63 75 96 % None (Room air) Lying   01/19/23 03:22:14 97 8 °F (36 6 °C) 79 16 114/72 86 96 % -- --   01/18/23 2353 98 7 °F (37 1 °C) 89 18 115/73 87 97 % -- --   01/18/23 20:02:36 100 5 °F (38 1 °C) 108 Abnormal  18 127/86 100 97 % -- --   01/18/23 15:02:02 98 9 °F (37 2 °C) 121 Abnormal  -- 129/88 102 96 % -- -- 01/18/23 15:01:41 98 9 °F (37 2 °C) 116 Abnormal  18 129/88 102 97 % None (Room air) Lying     Pertinent Labs/Diagnostic Results:   Results from last 7 days   Lab Units 01/18/23  1705   SARS-COV-2  Negative     Results from last 7 days   Lab Units 01/20/23  0557 01/19/23  0529 01/18/23  0859   WBC Thousand/uL 6 73 6 38 13 03*   HEMOGLOBIN g/dL 9 2* 9 7* 13 0   HEMATOCRIT % 30 5* 32 2* 42 9   PLATELETS Thousands/uL 248 258 399*   NEUTROS ABS Thousands/µL 4 23 4 15 11 38*         Results from last 7 days   Lab Units 01/20/23  0557 01/19/23  0529 01/18/23  0859   SODIUM mmol/L 136 137 131*   POTASSIUM mmol/L 3 7 3 4* 3 5   CHLORIDE mmol/L 104 103 95*   CO2 mmol/L 24 22 25   ANION GAP mmol/L 8 12 11   BUN mg/dL 6 8 11   CREATININE mg/dL 0 91 1 08 1 26   EGFR ml/min/1 73sq m 113 92 76   CALCIUM mg/dL 8 4 8 5 9 6   MAGNESIUM mg/dL  --   --  1 6     Results from last 7 days   Lab Units 01/20/23  0557 01/19/23  0529 01/18/23  0859   AST U/L 20 19 23   ALT U/L 26 32 60   ALK PHOS U/L 84 89 133*   TOTAL PROTEIN g/dL 6 0* 6 2* 9 1*   ALBUMIN g/dL 2 9* 3 1* 4 6   TOTAL BILIRUBIN mg/dL 0 48 0 74 1 10*         Results from last 7 days   Lab Units 01/20/23  0557 01/19/23  0529 01/18/23  0859   GLUCOSE RANDOM mg/dL 88 75 100       Results from last 7 days   Lab Units 01/18/23  0859   TSH 3RD GENERATON uIU/mL 1 514         Results from last 7 days   Lab Units 01/18/23  0859   LACTIC ACID mmol/L 1 2       Results from last 7 days   Lab Units 01/18/23  0859   LIPASE u/L 74     Results from last 7 days   Lab Units 01/18/23  0859   CRP mg/L 43 5*       Results from last 7 days   Lab Units 01/18/23  1143   CLARITY UA  Clear   COLOR UA  Yellow   SPEC GRAV UA  1 025   PH UA  6 0   GLUCOSE UA mg/dl Negative   KETONES UA mg/dl Negative   BLOOD UA  Trace-Intact*   PROTEIN UA mg/dl Negative   NITRITE UA  Negative   BILIRUBIN UA  Negative   UROBILINOGEN UA E U /dl 0 2   LEUKOCYTES UA  Negative   WBC UA /hpf None Seen   RBC UA /hpf 0-1 BACTERIA UA /hpf Occasional   EPITHELIAL CELLS WET PREP /hpf None Seen     Results from last 7 days   Lab Units 01/18/23  1705   INFLUENZA A PCR  Negative   INFLUENZA B PCR  Negative   RSV PCR  Negative       Results from last 7 days   Lab Units 01/18/23  1705   C DIFF TOXIN B BY PCR  Negative     Results from last 7 days   Lab Units 01/18/23  1705   SALMONELLA SP PCR  None Detected   SHIGELLA SP/ENTEROINVASIVE E  COLI (EIEC)  None Detected   CAMPYLOBACTER SP (JEJUNI AND COLI)  None Detected   SHIGA TOXIN 1/SHIGA TOXIN 2  None Detected         Results from last 7 days   Lab Units 01/18/23  0859   BLOOD CULTURE  No Growth at 24 hrs  No Growth at 24 hrs  Medications:   Scheduled Medications:  DULoxetine, 60 mg, Oral, Daily  enoxaparin, 40 mg, Subcutaneous, Daily  pantoprazole, 40 mg, Intravenous, Q24H KAITLIN      Continuous IV Infusions:  sodium chloride, 125 mL/hr, Intravenous, Continuous      PRN Meds:  acetaminophen, 650 mg, Oral, Q6H PRN  HYDROmorphone, 1 mg, Intravenous, Q4H PRN-1/19 x 6 - 1/20 x 3   labetalol, 10 mg, Intravenous, Q6H PRN  LORazepam, 0 5 mg, Intravenous, Q6H PRN  ondansetron, 4 mg, Intravenous, Q6H PRN-1/19 x 2 - 1/20 x 1   trimethobenzamide, 200 mg, Intramuscular, Q6H PRN-1/19 x 1         Discharge Plan:  D     Network Utilization Review Department  ATTENTION: Please call with any questions or concerns to [phone] and carefully listen to the prompts so that you are directed to the right person  All voicemails are confidential   Talita Fagan all requests for admission clinical reviews, approved or denied determinations and any other requests to dedicated fax number below belonging to the campus where the patient is receiving treatment   List of dedicated fax numbers for the Facilities:  [address] [place] [address] DENIALS (Administrative/Medical Necessity) [phone]   [address] (Maternity/NICU/Pediatrics) Smiley Severino 172 [address] Ana Leavitt 77 [phone]   [address] [place] [address] [facility] Carlos Cortes  [phone]   [address] [place] Leonora Hartman [place] 134 815 [facility] [phone]

## 2023-01-20 NOTE — PLAN OF CARE
Problem: PAIN - ADULT  Goal: Verbalizes/displays adequate comfort level or baseline comfort level  Description: Interventions:  - Encourage patient to monitor pain and request assistance  - Assess pain using appropriate pain scale  - Administer analgesics based on type and severity of pain and evaluate response  - Implement non-pharmacological measures as appropriate and evaluate response  - Consider cultural and social influences on pain and pain management  - Notify physician/advanced practitioner if interventions unsuccessful or patient reports new pain  Outcome: Progressing     Problem: INFECTION - ADULT  Goal: Absence or prevention of progression during hospitalization  Description: INTERVENTIONS:  - Assess and monitor for signs and symptoms of infection  - Monitor lab/diagnostic results  - Monitor all insertion sites, i e  indwelling lines, tubes, and drains  - Monitor endotracheal if appropriate and nasal secretions for changes in amount and color  - [place] appropriate cooling/warming therapies per order  - Administer medications as ordered  - Instruct and encourage patient and family to use good hand hygiene technique  - Identify and instruct in appropriate isolation precautions for identified infection/condition  Outcome: Progressing     Problem: SAFETY ADULT  Goal: Patient will remain free of falls  Description: INTERVENTIONS:  - Educate patient/family on patient safety including physical limitations  - Instruct patient to call for assistance with activity   - Consult OT/PT to assist with strengthening/mobility   - Keep Call bell within reach  - Keep bed low and locked with side rails adjusted as appropriate  - Keep care items and personal belongings within reach  - Initiate and maintain comfort rounds  - Make Fall Risk Sign visible to staff  - Offer Toileting every 2 Hours, in advance of need  - Apply yellow socks and bracelet for high fall risk patients  - Consider moving patient to room near nurses station  Outcome: Progressing  Goal: Maintain or return to baseline ADL function  Description: INTERVENTIONS:  -  Assess patient's ability to carry out ADLs; assess patient's baseline for ADL function and identify physical deficits which impact ability to perform ADLs (bathing, care of mouth/teeth, toileting, grooming, dressing, etc )  - Assess/evaluate cause of self-care deficits   - Assess range of motion  - Assess patient's mobility; develop plan if impaired  - Assess patient's need for assistive devices and provide as appropriate  - Encourage maximum independence but intervene and supervise when necessary  - Involve family in performance of ADLs  - Assess for home care needs following discharge   - Consider OT consult to assist with ADL evaluation and planning for discharge  - Provide patient education as appropriate  Outcome: Progressing  Goal: Maintains/Returns to pre admission functional level  Description: INTERVENTIONS:  - Perform BMAT or MOVE assessment daily    - Set and communicate daily mobility goal to care team and patient/family/caregiver  - Collaborate with rehabilitation services on mobility goals if consulted  - Perform Range of Motion 3 times a day  - Reposition patient every 3 hours    - Dangle patient 3 times a day  - Stand patient 3 times a day  - Ambulate patient 3 times a day  - Out of bed to chair 3 times a day   - Out of bed for meals 3 times a day  - Out of bed for toileting  - Record patient progress and toleration of activity level   Outcome: Progressing     Problem: DISCHARGE PLANNING  Goal: Discharge to home or other facility with appropriate resources  Description: INTERVENTIONS:  - Identify barriers to discharge w/patient and caregiver  - Arrange for needed discharge resources and transportation as appropriate  - Identify discharge learning needs (meds, wound care, etc )  - Arrange for interpretive services to assist at discharge as needed  - Refer to Case Management Department for coordinating discharge planning if the patient needs post-hospital services based on physician/advanced practitioner order or complex needs related to functional status, cognitive ability, or social support system  Outcome: Progressing     Problem: Knowledge Deficit  Goal: Patient/family/caregiver demonstrates understanding of disease process, treatment plan, medications, and discharge instructions  Description: Complete learning assessment and assess knowledge base    Interventions:  - Provide teaching at level of understanding  - Provide teaching via preferred learning methods  Outcome: Progressing

## 2023-01-20 NOTE — PLAN OF CARE
Problem: PAIN - ADULT  Goal: Verbalizes/displays adequate comfort level or baseline comfort level  Description: Interventions:  - Encourage patient to monitor pain and request assistance  - Assess pain using appropriate pain scale  - Administer analgesics based on type and severity of pain and evaluate response  - Implement non-pharmacological measures as appropriate and evaluate response  - Consider cultural and social influences on pain and pain management  - Notify physician/advanced practitioner if interventions unsuccessful or patient reports new pain  Outcome: Progressing     Problem: INFECTION - ADULT  Goal: Absence or prevention of progression during hospitalization  Description: INTERVENTIONS:  - Assess and monitor for signs and symptoms of infection  - Monitor lab/diagnostic results  - Monitor all insertion sites, i e  indwelling lines, tubes, and drains  - Monitor endotracheal if appropriate and nasal secretions for changes in amount and color  - [place] appropriate cooling/warming therapies per order  - Administer medications as ordered  - Instruct and encourage patient and family to use good hand hygiene technique  - Identify and instruct in appropriate isolation precautions for identified infection/condition  Outcome: Progressing     Problem: SAFETY ADULT  Goal: Patient will remain free of falls  Description: INTERVENTIONS:  - Educate patient/family on patient safety including physical limitations  - Instruct patient to call for assistance with activity   - Consult OT/PT to assist with strengthening/mobility   - Keep Call bell within reach  - Keep bed low and locked with side rails adjusted as appropriate  - Keep care items and personal belongings within reach  - Initiate and maintain comfort rounds  - Make Fall Risk Sign visible to staff  - Offer Toileting every 2 Hours, in advance of need  - Apply yellow socks and bracelet for high fall risk patients  - Consider moving patient to room near nurses station  Outcome: Progressing  Goal: Maintain or return to baseline ADL function  Description: INTERVENTIONS:  -  Assess patient's ability to carry out ADLs; assess patient's baseline for ADL function and identify physical deficits which impact ability to perform ADLs (bathing, care of mouth/teeth, toileting, grooming, dressing, etc )  - Assess/evaluate cause of self-care deficits   - Assess range of motion  - Assess patient's mobility; develop plan if impaired  - Assess patient's need for assistive devices and provide as appropriate  - Encourage maximum independence but intervene and supervise when necessary  - Involve family in performance of ADLs  - Assess for home care needs following discharge   - Consider OT consult to assist with ADL evaluation and planning for discharge  - Provide patient education as appropriate  Outcome: Progressing  Goal: Maintains/Returns to pre admission functional level  Description: INTERVENTIONS:  - Perform BMAT or MOVE assessment daily    - Set and communicate daily mobility goal to care team and patient/family/caregiver  - Collaborate with rehabilitation services on mobility goals if consulted  - Perform Range of Motion 3 times a day  - Reposition patient every 3 hours    - Dangle patient 3 times a day  - Stand patient 3 times a day  - Ambulate patient 3 times a day  - Out of bed to chair 3 times a day   - Out of bed for meals 3 times a day  - Out of bed for toileting  - Record patient progress and toleration of activity level   Outcome: Progressing     Problem: DISCHARGE PLANNING  Goal: Discharge to home or other facility with appropriate resources  Description: INTERVENTIONS:  - Identify barriers to discharge w/patient and caregiver  - Arrange for needed discharge resources and transportation as appropriate  - Identify discharge learning needs (meds, wound care, etc )  - Arrange for interpretive services to assist at discharge as needed  - Refer to Case Management Department for coordinating discharge planning if the patient needs post-hospital services based on physician/advanced practitioner order or complex needs related to functional status, cognitive ability, or social support system  Outcome: Progressing     Problem: Knowledge Deficit  Goal: Patient/family/caregiver demonstrates understanding of disease process, treatment plan, medications, and discharge instructions  Description: Complete learning assessment and assess knowledge base    Interventions:  - Provide teaching at level of understanding  - Provide teaching via preferred learning methods  Outcome: Progressing

## 2023-01-20 NOTE — ASSESSMENT & PLAN NOTE
PTA, nausea resolved, diarrhea  · REceived supportive tx while here with improvement in symptoms  · Stool cultures- unremarkable  · Started on questran   Pt to continue high dose for about 1 week and then decrease it to once daily after per GI

## 2023-01-20 NOTE — ASSESSMENT & PLAN NOTE
Recurrent, improved  patient followed by [facility] specialist in [facility]  · CT abdomen: neg for acute pathology  fluid-filled J-pouch with persistent mild mucosal hyperemia noted  · CRP elevated at 43 5  · S/P EGD which showed antral & proximal duodenal erosions/erythema  · Patient to follow up at [facility] after d/c   Currently not interested in local GI/IBD specialist

## 2023-01-20 NOTE — INTERVAL H&P NOTE
H&P reviewed  After examining the patient I find no changes in the patients condition since the H&P had been written      Vitals:    01/20/23 1253   BP: 127/83   Pulse: 88   Resp: 20   Temp:    SpO2: 97%

## 2023-01-20 NOTE — ANESTHESIA PREPROCEDURE EVALUATION
Procedure:  EGD    Relevant Problems   HEMATOLOGY   (+) Anemia      MUSCULOSKELETAL   (+) Ankylosing spondylitis (HCC)   (+) Left-sided low back pain without sciatica      NEURO/PSYCH   (+) Chronic pain syndrome   (+) CAR (generalized anxiety disorder)   (+) History of ulcerative colitis        Physical Exam    Airway    Mallampati score: II  TM Distance: >3 FB  Neck ROM: full     Dental   No notable dental hx     Cardiovascular      Pulmonary      Other Findings        Anesthesia Plan  ASA Score- 2     Anesthesia Type- IV sedation with anesthesia with ASA Monitors  Additional Monitors:   Airway Plan:           Plan Factors-Exercise tolerance (METS): >4 METS  Chart reviewed  Existing labs reviewed  Patient summary reviewed  Patient is not a current smoker  Induction- intravenous  Postoperative Plan-     Informed Consent- Anesthetic plan and risks discussed with patient  I personally reviewed this patient with the CRNA  Discussed and agreed on the Anesthesia Plan with the CRNA  Roanna Schlatter

## 2023-01-20 NOTE — ASSESSMENT & PLAN NOTE
Evident by leukocytosis, tachycardia    · CT abdomen negative for acute findings  · CXR neg for pneumonia  · Blood cultures-no growth  · stool cultures-negative for bacteria, positive for occult blood  · UA--WNL  · Lactic acid-WNL  · REceived a dose of Levaquin and metronidazole

## 2023-01-20 NOTE — PROGRESS NOTES
Julian 43  Progress Note - Randall Mahajan 1995, 32 y o  male MRN: [id number]  Unit/Bed#: [address] [place] [address] Encounter: [id number]  Primary Care Provider: Saman Woodall,    Date and time admitted to hospital: 1/18/2023  8:36 AM    Abdominal pain  Assessment & Plan  Recurrent, unresolved, patient followed by [facility] specialist in [facility]    · CT abdomen:No acute findings in the abdomen or pelvis  Right lower quadrant ileostomy  Reidentified fluid-filled J-pouch with persistent 3mild mucosal hyperemia  · Pain management  · GI consulted recs: -  CT a/p on admission-No acute findings in the abdomen or pelvis  Right lower quadrant ileostomy   Reidentified fluid-filled J-pouch with persistent 3mild mucosal hyperemia which has been an ongoing finding  -CRP was last 6 7 in Nov 2022, now with elevation of 43 5, also noted to have leukocytosis  -Surgical pathology was reviewed from small-bowel resection which showed -  Small intestine with active chronic enteritis, transmural defect (measuring 5cm) and with marked active serositis  Negative for dysplasia, negative for granuloma   Margin closer to perforation is viable and shows active chronic enteritis  Other margin is viable and shows acute serositis  Immunohistochemical stain for CMV is positive in rare cells    -Fecal calprotectin on last admission was less than 16  -Patient with acute onset and some leukocytosis with subjective fever and chills, will rule out infectious etiology, studies ordered, continue n p o , also will check for COVID/flu/RSV due to some upper respiratory complaints accompanying his gi complaints      -Strict CHELLE's to measure ileostomy output, advised patient we will need to avoid Imodium until infectious etiology has been     ruled    Electrolyte abnormality  Assessment & Plan  (Hyponatremia in the setting of nausea and vomiting as evidenced by Na 131, treated with NS bolus x3 and repeat BMP's)      Likely secondary to uncontrolled nausea vomiting, replenish sodium replenish sodium and chloride    · Replenish sodium and chloride Na/Cl  · IVF   · will trend      Chronic pain syndrome  Assessment & Plan  Recommend outpatient chronic pain management, very high tolerance and need for high pain medication    · Currently on Dilaudid   · patient refused Toradol        Ileostomy present [facility])  Assessment & Plan  Managed by [facility]    Patient reports changing ostomy bag every 3 to 4 days    · Revised 3 months ago, not nonproblematic  · I's and O's on output    Elevated C-reactive protein (CRP)  Assessment & Plan  Chronic,     · CRP-43 5 > trend if need to  · Likely multifactorial from infection versus, underlying autoimmune condition    SIRS (systemic inflammatory response syndrome) (HCC)  Assessment & Plan  Evident by leukocytosis, tachycardia    · CT abdomen negative for acute findings  · CXR-pending  · Possible GI source  · Blood cultures-no growth 24 hours  · stool cultures-negative for bacteria, positive for occult blood  · UA--WNL  · Lactic acid-WNL/Pro-Rubin-pending  · Levaquin and metronidazole empirically- continue per GI recommendations  · Continue to monitor for signs of infecion    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,    · Followed by [facility], outpt  · GI consulted    CAR (generalized anxiety disorder)  Assessment & Plan  Chronic,     · Restarted home duloxetine  · As needed low-dose for anxiety          * Nausea vomiting and diarrhea  Assessment & Plan  PTA, unresolved    · IVF normal saline   · Stool cultures-pending  · Zofran, Immodium prn        Subjective:   Patient seen and examined at bedside reported feeling that he is ok, especially after his EGD  Patient reported his pain being okay but noted that there was some bleeding after being scoped  Objective:   Vitals: Blood pressure 118/73, pulse 77, temperature 97 6 °F (36 4 °C), resp   rate 16, height 5' 9" (1 753 m), weight 82 kg (180 lb 12 4 oz), SpO2 97 %  ,Body mass index is 26 7 kg/m²  Intake/Output Summary (Last 24 hours) at 1/20/2023 0930  Last data filed at 1/20/2023 0646  Gross per 24 hour   Intake 1830 83 ml   Output 1600 ml   Net 230 83 ml       Physical Exam:   Physical Exam  Vitals reviewed  Constitutional:       General: He is not in acute distress  Appearance: Normal appearance  HENT:      Head: Atraumatic  Nose: No congestion  Eyes:      General: No scleral icterus  Pupils: Pupils are equal, round, and reactive to light  Cardiovascular:      Rate and Rhythm: Normal rate  Heart sounds: No murmur heard  Pulmonary:      Effort: No respiratory distress  Breath sounds: No wheezing, rhonchi or rales  Abdominal:      Palpations: Abdomen is soft  Tenderness: There is abdominal tenderness  There is no guarding  Comments: Periumbilical pouch   Musculoskeletal:         General: No swelling or deformity  Normal range of motion  Cervical back: No tenderness  Right lower leg: No edema  Left lower leg: No edema  Feet:      Right foot:      Skin integrity: No callus  Skin:     General: Skin is warm  Capillary Refill: Capillary refill takes less than 2 seconds  Findings: No lesion or rash  Neurological:      Mental Status: He is oriented to person, place, and time  Cranial Nerves: No cranial nerve deficit  Coordination: Coordination normal    Psychiatric:         Mood and Affect: Mood normal          Thought Content:  Thought content normal                      Recent Results (from the past 96 hour(s))   CBC and differential    Collection Time: 01/18/23  8:59 AM   Result Value Ref Range    WBC 13 03 (H) 4 31 - 10 16 Thousand/uL    RBC 6 90 (H) 3 88 - 5 62 Million/uL    Hemoglobin 13 0 12 0 - 17 0 g/dL    Hematocrit 42 9 36 5 - 49 3 %    MCV 62 (L) 82 - 98 fL    MCH 18 8 (L) 26 8 - 34 3 pg    MCHC 30 3 (L) 31 4 - 37 4 g/dL    RDW 18 2 (H) 11 6 - 15 1 %    MPV 8 6 (L) 8 9 - 12 7 fL    Platelets 202 (H) 877 - 390 Thousands/uL    nRBC 0 /100 WBCs    Neutrophils Relative 86 (H) 43 - 75 %    Immat GRANS % 1 0 - 2 %    Lymphocytes Relative 5 (L) 14 - 44 %    Monocytes Relative 6 4 - 12 %    Eosinophils Relative 1 0 - 6 %    Basophils Relative 1 0 - 1 %    Neutrophils Absolute 11 38 (H) 1 85 - 7 62 Thousands/µL    Immature Grans Absolute 0 07 0 00 - 0 20 Thousand/uL    Lymphocytes Absolute 0 63 0 60 - 4 47 Thousands/µL    Monocytes Absolute 0 78 0 17 - 1 22 Thousand/µL    Eosinophils Absolute 0 08 0 00 - 0 61 Thousand/µL    Basophils Absolute 0 09 0 00 - 0 10 Thousands/µL   Comprehensive metabolic panel    Collection Time: 01/18/23  8:59 AM   Result Value Ref Range    Sodium 131 (L) 135 - 147 mmol/L    Potassium 3 5 3 5 - 5 3 mmol/L    Chloride 95 (L) 96 - 108 mmol/L    CO2 25 21 - 32 mmol/L    ANION GAP 11 4 - 13 mmol/L    BUN 11 5 - 25 mg/dL    Creatinine 1 26 0 60 - 1 30 mg/dL    Glucose 100 65 - 140 mg/dL    Calcium 9 6 8 3 - 10 1 mg/dL    AST 23 5 - 45 U/L    ALT 60 12 - 78 U/L    Alkaline Phosphatase 133 (H) 46 - 116 U/L    Total Protein 9 1 (H) 6 4 - 8 4 g/dL    Albumin 4 6 3 5 - 5 0 g/dL    Total Bilirubin 1 10 (H) 0 20 - 1 00 mg/dL    eGFR 76 ml/min/1 73sq m   Lipase    Collection Time: 01/18/23  8:59 AM   Result Value Ref Range    Lipase 74 73 - 393 u/L   Magnesium    Collection Time: 01/18/23  8:59 AM   Result Value Ref Range    Magnesium 1 6 1 6 - 2 6 mg/dL   Lactic acid    Collection Time: 01/18/23  8:59 AM   Result Value Ref Range    LACTIC ACID 1 2 0 5 - 2 0 mmol/L   Blood culture #1    Collection Time: 01/18/23  8:59 AM    Specimen: Arm, Right; Blood   Result Value Ref Range    Blood Culture No Growth at 24 hrs  Blood culture #2    Collection Time: 01/18/23  8:59 AM    Specimen: Arm, Right; Blood   Result Value Ref Range    Blood Culture No Growth at 24 hrs      TSH, 3rd generation with Free T4 reflex    Collection Time: 01/18/23  8:59 AM   Result Value Ref Range TSH 3RD GENERATON 1 514 0 450 - 4 500 uIU/mL   C-reactive protein    Collection Time: 01/18/23  8:59 AM   Result Value Ref Range    CRP 43 5 (H) <3 0 mg/L   UA (URINE) with reflex to Scope    Collection Time: 01/18/23 11:43 AM   Result Value Ref Range    Color, UA Yellow     Clarity, UA Clear     Specific Gravity, UA 1 025 1 000 - 1 030    pH, UA 6 0 5 0, 5 5, 6 0, 6 5, 7 0, 7 5, 8 0, 8 5, 9 0    Leukocytes, UA Negative Negative    Nitrite, UA Negative Negative    Protein, UA Negative Negative mg/dl    Glucose, UA Negative Negative mg/dl    Ketones, UA Negative Negative mg/dl    Urobilinogen, UA 0 2 0 2, 1 0 E U /dl E U /dl    Bilirubin, UA Negative Negative    Occult Blood, UA Trace-Intact (A) Negative   Urine Microscopic    Collection Time: 01/18/23 11:43 AM   Result Value Ref Range    RBC, UA 0-1 None Seen, 0-1, 1-2, 2-4, 0-5 /hpf    WBC, UA None Seen None Seen, 0-1, 1-2, 0-5, 2-4 /hpf    Epithelial Cells None Seen None Seen, Occasional /hpf    Bacteria, UA Occasional None Seen, Occasional /hpf   Rotavirus antigen, stool    Collection Time: 01/18/23  5:05 PM   Result Value Ref Range    Rotavirus Negative Negative   Occult blood 1-3, stool    Collection Time: 01/18/23  5:05 PM   Result Value Ref Range    Fecal Occult Blood Diagnostic Positive (A) Negative    Fecal Occult Blood Diagnostic 2 Test not performed Negative    Fecal Occult Blood Diagnostic 3 Test not performed Negative   Stool Enteric Bacterial Panel by PCR    Collection Time: 01/18/23  5:05 PM    Specimen: Per Stoma; Stool   Result Value Ref Range    Salmonella sp PCR None Detected None Detected    Shigella sp/Enteroinvasive E  coli (EIEC) PCR None Detected None Detected    Campylobacter sp (jejuni and coli) PCR None Detected None Detected    Shiga toxin 1/Shiga toxin 2 genes PCR None Detected None Detected   Clostridium difficile toxin by PCR with EIA    Collection Time: 01/18/23  5:05 PM    Specimen: Per Stoma; Stool   Result Value Ref Range C difficile toxin by PCR Negative Negative   COVID/FLU/RSV    Collection Time: 01/18/23  5:05 PM    Specimen: Nose; Nares   Result Value Ref Range    SARS-CoV-2 Negative Negative    INFLUENZA A PCR Negative Negative    INFLUENZA B PCR Negative Negative    RSV PCR Negative Negative   Comprehensive metabolic panel    Collection Time: 01/19/23  5:29 AM   Result Value Ref Range    Sodium 137 135 - 147 mmol/L    Potassium 3 4 (L) 3 5 - 5 3 mmol/L    Chloride 103 96 - 108 mmol/L    CO2 22 21 - 32 mmol/L    ANION GAP 12 4 - 13 mmol/L    BUN 8 5 - 25 mg/dL    Creatinine 1 08 0 60 - 1 30 mg/dL    Glucose 75 65 - 140 mg/dL    Calcium 8 5 8 3 - 10 1 mg/dL    Corrected Calcium 9 2 8 3 - 10 1 mg/dL    AST 19 5 - 45 U/L    ALT 32 12 - 78 U/L    Alkaline Phosphatase 89 46 - 116 U/L    Total Protein 6 2 (L) 6 4 - 8 4 g/dL    Albumin 3 1 (L) 3 5 - 5 0 g/dL    Total Bilirubin 0 74 0 20 - 1 00 mg/dL    eGFR 92 ml/min/1 73sq m   CBC and differential    Collection Time: 01/19/23  5:29 AM   Result Value Ref Range    WBC 6 38 4 31 - 10 16 Thousand/uL    RBC 5 12 3 88 - 5 62 Million/uL    Hemoglobin 9 7 (L) 12 0 - 17 0 g/dL    Hematocrit 32 2 (L) 36 5 - 49 3 %    MCV 63 (L) 82 - 98 fL    MCH 18 9 (L) 26 8 - 34 3 pg    MCHC 30 1 (L) 31 4 - 37 4 g/dL    RDW 16 1 (H) 11 6 - 15 1 %    MPV 8 5 (L) 8 9 - 12 7 fL    Platelets 045 671 - 590 Thousands/uL    nRBC 0 /100 WBCs    Neutrophils Relative 65 43 - 75 %    Immat GRANS % 0 0 - 2 %    Lymphocytes Relative 17 14 - 44 %    Monocytes Relative 13 (H) 4 - 12 %    Eosinophils Relative 4 0 - 6 %    Basophils Relative 1 0 - 1 %    Neutrophils Absolute 4 15 1 85 - 7 62 Thousands/µL    Immature Grans Absolute 0 02 0 00 - 0 20 Thousand/uL    Lymphocytes Absolute 1 06 0 60 - 4 47 Thousands/µL    Monocytes Absolute 0 83 0 17 - 1 22 Thousand/µL    Eosinophils Absolute 0 23 0 00 - 0 61 Thousand/µL    Basophils Absolute 0 09 0 00 - 0 10 Thousands/µL   Comprehensive metabolic panel    Collection Time: 01/20/23 5: 57 AM   Result Value Ref Range    Sodium 136 135 - 147 mmol/L    Potassium 3 7 3 5 - 5 3 mmol/L    Chloride 104 96 - 108 mmol/L    CO2 24 21 - 32 mmol/L    ANION GAP 8 4 - 13 mmol/L    BUN 6 5 - 25 mg/dL    Creatinine 0 91 0 60 - 1 30 mg/dL    Glucose 88 65 - 140 mg/dL    Calcium 8 4 8 3 - 10 1 mg/dL    Corrected Calcium 9 3 8 3 - 10 1 mg/dL    AST 20 5 - 45 U/L    ALT 26 12 - 78 U/L    Alkaline Phosphatase 84 46 - 116 U/L    Total Protein 6 0 (L) 6 4 - 8 4 g/dL    Albumin 2 9 (L) 3 5 - 5 0 g/dL    Total Bilirubin 0 48 0 20 - 1 00 mg/dL    eGFR 113 ml/min/1 73sq m   CBC and differential    Collection Time: 01/20/23  5:57 AM   Result Value Ref Range    WBC 6 73 4 31 - 10 16 Thousand/uL    RBC 4 86 3 88 - 5 62 Million/uL    Hemoglobin 9 2 (L) 12 0 - 17 0 g/dL    Hematocrit 30 5 (L) 36 5 - 49 3 %    MCV 63 (L) 82 - 98 fL    MCH 18 9 (L) 26 8 - 34 3 pg    MCHC 30 2 (L) 31 4 - 37 4 g/dL    RDW 16 0 (H) 11 6 - 15 1 %    MPV 8 5 (L) 8 9 - 12 7 fL    Platelets 684 541 - 302 Thousands/uL    nRBC 0 /100 WBCs    Neutrophils Relative 63 43 - 75 %    Immat GRANS % 0 0 - 2 %    Lymphocytes Relative 16 14 - 44 %    Monocytes Relative 14 (H) 4 - 12 %    Eosinophils Relative 6 0 - 6 %    Basophils Relative 1 0 - 1 %    Neutrophils Absolute 4 23 1 85 - 7 62 Thousands/µL    Immature Grans Absolute 0 01 0 00 - 0 20 Thousand/uL    Lymphocytes Absolute 1 06 0 60 - 4 47 Thousands/µL    Monocytes Absolute 0 95 0 17 - 1 22 Thousand/µL    Eosinophils Absolute 0 42 0 00 - 0 61 Thousand/µL    Basophils Absolute 0 06 0 00 - 0 10 Thousands/µL         All medications, consultant notes and nursing records reviewed  Lab, EKG, Telemetry, Imaging and other studies: I have personally reviewed pertinent reports  VTE Pharmacologic Prophylaxis: Enoxaparin (Lovenox)  VTE Mechanical Prophylaxis: sequential compression device    PT/OT/ST reviewed  Discussed with   Plan was coordinated with Nursing staff & Case management    Shared decision making care plan was discussed with family  Total time >30 minutes with >50 % of spent in coordination & counseling  Discharge time (if applicable) :    Portions of the record may have been created with voice recognition software  Occasional wrong word or "sound a like" substitutions may have occurred due to the inherent limitations of voice recognition software  Read the chart carefully and recognize, using context, where substitutions have occurred

## 2023-01-21 PROBLEM — R65.10 SIRS (SYSTEMIC INFLAMMATORY RESPONSE SYNDROME) (HCC): Status: RESOLVED | Noted: 2022-02-24 | Resolved: 2023-01-21

## 2023-01-21 LAB
ALBUMIN SERPL BCP-MCNC: 2.9 G/DL (ref 3.5–5)
ALP SERPL-CCNC: 88 U/L (ref 46–116)
ALT SERPL W P-5'-P-CCNC: 28 U/L (ref 12–78)
ANION GAP SERPL CALCULATED.3IONS-SCNC: 6 MMOL/L (ref 4–13)
AST SERPL W P-5'-P-CCNC: 17 U/L (ref 5–45)
BASOPHILS # BLD AUTO: 0.05 THOUSANDS/ÂΜL (ref 0–0.1)
BASOPHILS NFR BLD AUTO: 1 % (ref 0–1)
BILIRUB SERPL-MCNC: 0.39 MG/DL (ref 0.2–1)
BUN SERPL-MCNC: 3 MG/DL (ref 5–25)
CALCIUM ALBUM COR SERPL-MCNC: 9.4 MG/DL (ref 8.3–10.1)
CALCIUM SERPL-MCNC: 8.5 MG/DL (ref 8.3–10.1)
CHLORIDE SERPL-SCNC: 102 MMOL/L (ref 96–108)
CO2 SERPL-SCNC: 26 MMOL/L (ref 21–32)
CREAT SERPL-MCNC: 0.9 MG/DL (ref 0.6–1.3)
EOSINOPHIL # BLD AUTO: 0.31 THOUSAND/ÂΜL (ref 0–0.61)
EOSINOPHIL NFR BLD AUTO: 5 % (ref 0–6)
ERYTHROCYTE [DISTWIDTH] IN BLOOD BY AUTOMATED COUNT: 15.9 % (ref 11.6–15.1)
FERRITIN SERPL-MCNC: 151 NG/ML (ref 8–388)
GFR SERPL CREATININE-BSD FRML MDRD: 115 ML/MIN/1.73SQ M
GLUCOSE SERPL-MCNC: 92 MG/DL (ref 65–140)
HCT VFR BLD AUTO: 30.1 % (ref 36.5–49.3)
HGB BLD-MCNC: 9 G/DL (ref 12–17)
IMM GRANULOCYTES # BLD AUTO: 0.02 THOUSAND/UL (ref 0–0.2)
IMM GRANULOCYTES NFR BLD AUTO: 0 % (ref 0–2)
IRON SATN MFR SERPL: 16 % (ref 20–50)
IRON SERPL-MCNC: 43 UG/DL (ref 65–175)
LYMPHOCYTES # BLD AUTO: 0.99 THOUSANDS/ÂΜL (ref 0.6–4.47)
LYMPHOCYTES NFR BLD AUTO: 16 % (ref 14–44)
MCH RBC QN AUTO: 18.8 PG (ref 26.8–34.3)
MCHC RBC AUTO-ENTMCNC: 29.9 G/DL (ref 31.4–37.4)
MCV RBC AUTO: 63 FL (ref 82–98)
MONOCYTES # BLD AUTO: 0.86 THOUSAND/ÂΜL (ref 0.17–1.22)
MONOCYTES NFR BLD AUTO: 14 % (ref 4–12)
NEUTROPHILS # BLD AUTO: 3.79 THOUSANDS/ÂΜL (ref 1.85–7.62)
NEUTS SEG NFR BLD AUTO: 64 % (ref 43–75)
NRBC BLD AUTO-RTO: 0 /100 WBCS
PLATELET # BLD AUTO: 244 THOUSANDS/UL (ref 149–390)
PMV BLD AUTO: 8.5 FL (ref 8.9–12.7)
POTASSIUM SERPL-SCNC: 3.3 MMOL/L (ref 3.5–5.3)
PROT SERPL-MCNC: 5.9 G/DL (ref 6.4–8.4)
RBC # BLD AUTO: 4.78 MILLION/UL (ref 3.88–5.62)
SODIUM SERPL-SCNC: 134 MMOL/L (ref 135–147)
TIBC SERPL-MCNC: 265 UG/DL (ref 250–450)
WBC # BLD AUTO: 6.02 THOUSAND/UL (ref 4.31–10.16)

## 2023-01-21 RX ADMIN — HYDROMORPHONE HYDROCHLORIDE 1 MG: 1 INJECTION, SOLUTION INTRAMUSCULAR; INTRAVENOUS; SUBCUTANEOUS at 15:34

## 2023-01-21 RX ADMIN — HYDROMORPHONE HYDROCHLORIDE 1 MG: 1 INJECTION, SOLUTION INTRAMUSCULAR; INTRAVENOUS; SUBCUTANEOUS at 02:36

## 2023-01-21 RX ADMIN — HYDROMORPHONE HYDROCHLORIDE 1 MG: 1 INJECTION, SOLUTION INTRAMUSCULAR; INTRAVENOUS; SUBCUTANEOUS at 11:02

## 2023-01-21 RX ADMIN — SODIUM CHLORIDE 125 ML/HR: 0.9 INJECTION, SOLUTION INTRAVENOUS at 00:16

## 2023-01-21 RX ADMIN — PANTOPRAZOLE SODIUM 40 MG: 40 INJECTION, POWDER, FOR SOLUTION INTRAVENOUS at 09:01

## 2023-01-21 RX ADMIN — ENOXAPARIN SODIUM 40 MG: 40 INJECTION SUBCUTANEOUS at 09:01

## 2023-01-21 RX ADMIN — IRON SUCROSE 200 MG: 20 INJECTION, SOLUTION INTRAVENOUS at 10:58

## 2023-01-21 RX ADMIN — ACETAMINOPHEN 650 MG: 325 TABLET, FILM COATED ORAL at 11:02

## 2023-01-21 RX ADMIN — HYDROMORPHONE HYDROCHLORIDE 1 MG: 1 INJECTION, SOLUTION INTRAMUSCULAR; INTRAVENOUS; SUBCUTANEOUS at 20:09

## 2023-01-21 RX ADMIN — ONDANSETRON 4 MG: 2 INJECTION INTRAMUSCULAR; INTRAVENOUS at 11:07

## 2023-01-21 RX ADMIN — HYDROMORPHONE HYDROCHLORIDE 1 MG: 1 INJECTION, SOLUTION INTRAMUSCULAR; INTRAVENOUS; SUBCUTANEOUS at 07:02

## 2023-01-21 RX ADMIN — DULOXETINE HYDROCHLORIDE 60 MG: 60 CAPSULE, DELAYED RELEASE ORAL at 09:00

## 2023-01-21 RX ADMIN — SODIUM CHLORIDE 125 ML/HR: 0.9 INJECTION, SOLUTION INTRAVENOUS at 20:54

## 2023-01-21 RX ADMIN — SODIUM CHLORIDE 125 ML/HR: 0.9 INJECTION, SOLUTION INTRAVENOUS at 06:58

## 2023-01-21 NOTE — PLAN OF CARE
Problem: PAIN - ADULT  Goal: Verbalizes/displays adequate comfort level or baseline comfort level  Description: Interventions:  - Encourage patient to monitor pain and request assistance  - Assess pain using appropriate pain scale  - Administer analgesics based on type and severity of pain and evaluate response  - Implement non-pharmacological measures as appropriate and evaluate response  - Consider cultural and social influences on pain and pain management  - Notify physician/advanced practitioner if interventions unsuccessful or patient reports new pain  Outcome: Progressing     Problem: INFECTION - ADULT  Goal: Absence or prevention of progression during hospitalization  Description: INTERVENTIONS:  - Assess and monitor for signs and symptoms of infection  - Monitor lab/diagnostic results  - Monitor all insertion sites, i e  indwelling lines, tubes, and drains  - Monitor endotracheal if appropriate and nasal secretions for changes in amount and color  - [place] appropriate cooling/warming therapies per order  - Administer medications as ordered  - Instruct and encourage patient and family to use good hand hygiene technique  - Identify and instruct in appropriate isolation precautions for identified infection/condition  Outcome: Progressing     Problem: SAFETY ADULT  Goal: Patient will remain free of falls  Description: INTERVENTIONS:  - Educate patient/family on patient safety including physical limitations  - Instruct patient to call for assistance with activity   - Consult OT/PT to assist with strengthening/mobility   - Keep Call bell within reach  - Keep bed low and locked with side rails adjusted as appropriate  - Keep care items and personal belongings within reach  - Initiate and maintain comfort rounds  - Make Fall Risk Sign visible to staff  - Offer Toileting every 2 Hours, in advance of need  - Apply yellow socks and bracelet for high fall risk patients  - Consider moving patient to room near nurses station  Outcome: Progressing  Goal: Maintain or return to baseline ADL function  Description: INTERVENTIONS:  -  Assess patient's ability to carry out ADLs; assess patient's baseline for ADL function and identify physical deficits which impact ability to perform ADLs (bathing, care of mouth/teeth, toileting, grooming, dressing, etc )  - Assess/evaluate cause of self-care deficits   - Assess range of motion  - Assess patient's mobility; develop plan if impaired  - Assess patient's need for assistive devices and provide as appropriate  - Encourage maximum independence but intervene and supervise when necessary  - Involve family in performance of ADLs  - Assess for home care needs following discharge   - Consider OT consult to assist with ADL evaluation and planning for discharge  - Provide patient education as appropriate  Outcome: Progressing  Goal: Maintains/Returns to pre admission functional level  Description: INTERVENTIONS:  - Perform BMAT or MOVE assessment daily    - Set and communicate daily mobility goal to care team and patient/family/caregiver  - Collaborate with rehabilitation services on mobility goals if consulted  - Perform Range of Motion 3 times a day  - Reposition patient every 3 hours    - Dangle patient 3 times a day  - Stand patient 3 times a day  - Ambulate patient 3 times a day  - Out of bed to chair 3 times a day   - Out of bed for meals 3 times a day  - Out of bed for toileting  - Record patient progress and toleration of activity level   Outcome: Progressing     Problem: DISCHARGE PLANNING  Goal: Discharge to home or other facility with appropriate resources  Description: INTERVENTIONS:  - Identify barriers to discharge w/patient and caregiver  - Arrange for needed discharge resources and transportation as appropriate  - Identify discharge learning needs (meds, wound care, etc )  - Arrange for interpretive services to assist at discharge as needed  - Refer to Case Management Department for coordinating discharge planning if the patient needs post-hospital services based on physician/advanced practitioner order or complex needs related to functional status, cognitive ability, or social support system  Outcome: Progressing     Problem: Knowledge Deficit  Goal: Patient/family/caregiver demonstrates understanding of disease process, treatment plan, medications, and discharge instructions  Description: Complete learning assessment and assess knowledge base    Interventions:  - Provide teaching at level of understanding  - Provide teaching via preferred learning methods  Outcome: Progressing

## 2023-01-21 NOTE — ASSESSMENT & PLAN NOTE
Hb trended down but overalll stable  · Stool occult blood positive  · iron studies reviewed   Started on ferrous sulfate on d/c after d/w GI  · Venofer 200mg while here

## 2023-01-21 NOTE — PLAN OF CARE
Problem: PAIN - ADULT  Goal: Verbalizes/displays adequate comfort level or baseline comfort level  Description: Interventions:  - Encourage patient to monitor pain and request assistance  - Assess pain using appropriate pain scale  - Administer analgesics based on type and severity of pain and evaluate response  - Implement non-pharmacological measures as appropriate and evaluate response  - Consider cultural and social influences on pain and pain management  - Notify physician/advanced practitioner if interventions unsuccessful or patient reports new pain  Outcome: Progressing     Problem: DISCHARGE PLANNING  Goal: Discharge to home or other facility with appropriate resources  Description: INTERVENTIONS:  - Identify barriers to discharge w/patient   - Arrange for needed discharge resources and transportation as appropriate  - Identify discharge learning needs (meds, wound care, etc )  Outcome: Progressing     Problem: Knowledge Deficit  Goal: Patient/family/caregiver demonstrates understanding of disease process, treatment plan, medications, and discharge instructions  Description: Complete learning assessment and assess knowledge base    Interventions:  - Provide teaching at level of understanding  - Provide teaching via preferred learning methods  Outcome: Progressing

## 2023-01-21 NOTE — PROGRESS NOTES
Julian 43  Progress Note - Samira Pat 1995, 32 y o  male MRN: [id number]  Unit/Bed#: [address] [place] [address] Encounter: [id number]  Primary Care Provider: Joey Santiago,    Date and time admitted to hospital: 1/18/2023  8:36 AM    Abdominal pain  Assessment & Plan  Recurrent, improving slowly, patient followed by [facility] specialist in [facility]    · CT abdomen:No acute findings in the abdomen or pelvis  Right lower quadrant ileostomy  Reidentified fluid-filled J-pouch with persistent 3mild mucosal hyperemia  · Pain management  · GI consulted recs: -  CT a/p on admission-No acute findings in the abdomen or pelvis  Right lower quadrant ileostomy   Reidentified fluid-filled J-pouch with persistent 3mild mucosal hyperemia which has been an ongoing finding  -CRP was last 6 7 in Nov 2022, now with elevation of 43 5, also noted to have leukocytosis  -Surgical pathology was reviewed from small-bowel resection which showed -  Small intestine with active chronic enteritis, transmural defect (measuring 5cm) and with marked active serositis  Negative for dysplasia, negative for granuloma   Margin closer to perforation is viable and shows active chronic enteritis  Other margin is viable and shows acute serositis  Immunohistochemical stain for CMV is positive in rare cells    -Fecal calprotectin on last admission was less than 16  -Patient with acute onset and some leukocytosis with subjective fever and chills, will rule out infectious etiology, studies ordered, continue n p o , also will check for COVID/flu/RSV due to some upper respiratory complaints accompanying his gi complaints      -Strict CHELLE's to measure ileostomy output, advised patient we will need to avoid Imodium until infectious etiology has been     ruled    Electrolyte abnormality  Assessment & Plan  (Hyponatremia in the setting of nausea and vomiting as evidenced by Na 131, treated with NS bolus x3 and repeat BMP's)      Likely secondary to uncontrolled nausea vomiting, replenish sodium replenish sodium and chloride    · Replenish sodium and chloride Na/Cl  · IVF   · will trend      Chronic pain syndrome  Assessment & Plan  Recommend outpatient chronic pain management, very high tolerance and need for high pain medication    · Currently on Dilaudid   · patient refused Toradol        Ileostomy present [facility])  Assessment & Plan  Managed by [facility]    Patient reports changing ostomy bag every 3 to 4 days    · Revised 3 months ago, not nonproblematic  · I's and O's on output    Elevated C-reactive protein (CRP)  Assessment & Plan  Chronic,     · CRP-43 5 > trend if need to  · Likely multifactorial from infection versus, underlying autoimmune condition    Anemia  Assessment & Plan  Unresolved,    H&H 13 on admission, 9 0, likely multifactorial from GI blood loss and dilutional effect    · H&H trending  · Stool occult blood positive  · Repeat, iron studies; normal-11/22-pending  · Venofer 391AH    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,    · Followed by [facility], outpt  · GI consulted    CAR (generalized anxiety disorder)  Assessment & Plan  Chronic,     · Restarted home duloxetine  · As needed low-dose for anxiety          * Nausea vomiting and diarrhea  Assessment & Plan  PTA, unresolved    · IVF normal saline   · Stool cultures-pending  · Zofran, Immodium prn      SIRS (systemic inflammatory response syndrome) (HCC)-resolved as of 1/21/2023  Assessment & Plan  Evident by leukocytosis, tachycardia    · CT abdomen negative for acute findings  · CXR-pending  · Possible GI source  · Blood cultures-no growth 24 hours  · stool cultures-negative for bacteria, positive for occult blood  · UA--WNL  · Lactic acid-WNL/Pro-Rubin-pending  · Levaquin and metronidazole empirically- continue per GI recommendations  · Continue to monitor for signs of infecion      Subjective:   Patient seen and examined at bedside reported that he did not sleep overnight due to having to empty out ostomy bag 7 times  Patient reported stool was green with a darker tent now  Patient denied any nausea or vomiting at this time     It patient reported pain 4/10  Objective:   Vitals: Blood pressure 118/77, pulse 74, temperature 97 5 °F (36 4 °C), resp  rate 18, height 5' 9" (1 753 m), weight 81 6 kg (180 lb), SpO2 94 %  ,Body mass index is 26 58 kg/m²  Intake/Output Summary (Last 24 hours) at 1/21/2023 1404  Last data filed at 1/21/2023 0240  Gross per 24 hour   Intake --   Output 2550 ml   Net -2550 ml       Physical Exam:   Physical Exam  Vitals reviewed  Constitutional:       General: He is not in acute distress  Appearance: Normal appearance  He is not ill-appearing  Comments: Fatigue   HENT:      Head: Atraumatic  Nose: No congestion  Eyes:      General: No scleral icterus  Pupils: Pupils are equal, round, and reactive to light  Cardiovascular:      Rate and Rhythm: Normal rate  Heart sounds: No murmur heard  Pulmonary:      Effort: No respiratory distress  Breath sounds: No wheezing, rhonchi or rales  Abdominal:      Palpations: Abdomen is soft  Tenderness: There is abdominal tenderness  There is no guarding  Comments: Right periumbilical ostomy, CDI   Musculoskeletal:         General: No swelling or deformity  Normal range of motion  Cervical back: No tenderness  Right lower leg: No edema  Left lower leg: No edema  Feet:      Right foot:      Skin integrity: No callus  Skin:     General: Skin is warm  Capillary Refill: Capillary refill takes less than 2 seconds  Findings: No lesion or rash  Neurological:      Mental Status: He is oriented to person, place, and time  Cranial Nerves: No cranial nerve deficit  Coordination: Coordination normal    Psychiatric:         Mood and Affect: Mood normal          Thought Content:  Thought content normal                      Recent Results (from the past 96 hour(s))   CBC and differential    Collection Time: 01/18/23  8:59 AM   Result Value Ref Range    WBC 13 03 (H) 4 31 - 10 16 Thousand/uL    RBC 6 90 (H) 3 88 - 5 62 Million/uL    Hemoglobin 13 0 12 0 - 17 0 g/dL    Hematocrit 42 9 36 5 - 49 3 %    MCV 62 (L) 82 - 98 fL    MCH 18 8 (L) 26 8 - 34 3 pg    MCHC 30 3 (L) 31 4 - 37 4 g/dL    RDW 18 2 (H) 11 6 - 15 1 %    MPV 8 6 (L) 8 9 - 12 7 fL    Platelets 879 (H) 397 - 390 Thousands/uL    nRBC 0 /100 WBCs    Neutrophils Relative 86 (H) 43 - 75 %    Immat GRANS % 1 0 - 2 %    Lymphocytes Relative 5 (L) 14 - 44 %    Monocytes Relative 6 4 - 12 %    Eosinophils Relative 1 0 - 6 %    Basophils Relative 1 0 - 1 %    Neutrophils Absolute 11 38 (H) 1 85 - 7 62 Thousands/µL    Immature Grans Absolute 0 07 0 00 - 0 20 Thousand/uL    Lymphocytes Absolute 0 63 0 60 - 4 47 Thousands/µL    Monocytes Absolute 0 78 0 17 - 1 22 Thousand/µL    Eosinophils Absolute 0 08 0 00 - 0 61 Thousand/µL    Basophils Absolute 0 09 0 00 - 0 10 Thousands/µL   Comprehensive metabolic panel    Collection Time: 01/18/23  8:59 AM   Result Value Ref Range    Sodium 131 (L) 135 - 147 mmol/L    Potassium 3 5 3 5 - 5 3 mmol/L    Chloride 95 (L) 96 - 108 mmol/L    CO2 25 21 - 32 mmol/L    ANION GAP 11 4 - 13 mmol/L    BUN 11 5 - 25 mg/dL    Creatinine 1 26 0 60 - 1 30 mg/dL    Glucose 100 65 - 140 mg/dL    Calcium 9 6 8 3 - 10 1 mg/dL    AST 23 5 - 45 U/L    ALT 60 12 - 78 U/L    Alkaline Phosphatase 133 (H) 46 - 116 U/L    Total Protein 9 1 (H) 6 4 - 8 4 g/dL    Albumin 4 6 3 5 - 5 0 g/dL    Total Bilirubin 1 10 (H) 0 20 - 1 00 mg/dL    eGFR 76 ml/min/1 73sq m   Lipase    Collection Time: 01/18/23  8:59 AM   Result Value Ref Range    Lipase 74 73 - 393 u/L   Magnesium    Collection Time: 01/18/23  8:59 AM   Result Value Ref Range    Magnesium 1 6 1 6 - 2 6 mg/dL   Lactic acid    Collection Time: 01/18/23  8:59 AM   Result Value Ref Range    LACTIC ACID 1 2 0 5 - 2 0 mmol/L   Blood culture #1    Collection Time: 01/18/23  8:59 AM    Specimen: Arm, Right; Blood   Result Value Ref Range    Blood Culture No Growth at 72 hrs  Blood culture #2    Collection Time: 01/18/23  8:59 AM    Specimen: Arm, Right; Blood   Result Value Ref Range    Blood Culture No Growth at 72 hrs      TSH, 3rd generation with Free T4 reflex    Collection Time: 01/18/23  8:59 AM   Result Value Ref Range    TSH 3RD GENERATON 1 514 0 450 - 4 500 uIU/mL   C-reactive protein    Collection Time: 01/18/23  8:59 AM   Result Value Ref Range    CRP 43 5 (H) <3 0 mg/L   UA (URINE) with reflex to Scope    Collection Time: 01/18/23 11:43 AM   Result Value Ref Range    Color, UA Yellow     Clarity, UA Clear     Specific Gravity, UA 1 025 1 000 - 1 030    pH, UA 6 0 5 0, 5 5, 6 0, 6 5, 7 0, 7 5, 8 0, 8 5, 9 0    Leukocytes, UA Negative Negative    Nitrite, UA Negative Negative    Protein, UA Negative Negative mg/dl    Glucose, UA Negative Negative mg/dl    Ketones, UA Negative Negative mg/dl    Urobilinogen, UA 0 2 0 2, 1 0 E U /dl E U /dl    Bilirubin, UA Negative Negative    Occult Blood, UA Trace-Intact (A) Negative   Urine Microscopic    Collection Time: 01/18/23 11:43 AM   Result Value Ref Range    RBC, UA 0-1 None Seen, 0-1, 1-2, 2-4, 0-5 /hpf    WBC, UA None Seen None Seen, 0-1, 1-2, 0-5, 2-4 /hpf    Epithelial Cells None Seen None Seen, Occasional /hpf    Bacteria, UA Occasional None Seen, Occasional /hpf   Rotavirus antigen, stool    Collection Time: 01/18/23  5:05 PM   Result Value Ref Range    Rotavirus Negative Negative   Occult blood 1-3, stool    Collection Time: 01/18/23  5:05 PM   Result Value Ref Range    Fecal Occult Blood Diagnostic Positive (A) Negative    Fecal Occult Blood Diagnostic 2 Test not performed Negative    Fecal Occult Blood Diagnostic 3 Test not performed Negative   Stool Enteric Bacterial Panel by PCR    Collection Time: 01/18/23  5:05 PM    Specimen: Per Stoma; Stool   Result Value Ref Range    Salmonella sp PCR None Detected None Detected    Shigella sp/Enteroinvasive E  coli (EIEC) PCR None Detected None Detected    Campylobacter sp (jejuni and coli) PCR None Detected None Detected    Shiga toxin 1/Shiga toxin 2 genes PCR None Detected None Detected   Clostridium difficile toxin by PCR with EIA    Collection Time: 01/18/23  5:05 PM    Specimen: Per Stoma; Stool   Result Value Ref Range    C difficile toxin by PCR Negative Negative   COVID/FLU/RSV    Collection Time: 01/18/23  5:05 PM    Specimen: Nose; Nares   Result Value Ref Range    SARS-CoV-2 Negative Negative    INFLUENZA A PCR Negative Negative    INFLUENZA B PCR Negative Negative    RSV PCR Negative Negative   Comprehensive metabolic panel    Collection Time: 01/19/23  5:29 AM   Result Value Ref Range    Sodium 137 135 - 147 mmol/L    Potassium 3 4 (L) 3 5 - 5 3 mmol/L    Chloride 103 96 - 108 mmol/L    CO2 22 21 - 32 mmol/L    ANION GAP 12 4 - 13 mmol/L    BUN 8 5 - 25 mg/dL    Creatinine 1 08 0 60 - 1 30 mg/dL    Glucose 75 65 - 140 mg/dL    Calcium 8 5 8 3 - 10 1 mg/dL    Corrected Calcium 9 2 8 3 - 10 1 mg/dL    AST 19 5 - 45 U/L    ALT 32 12 - 78 U/L    Alkaline Phosphatase 89 46 - 116 U/L    Total Protein 6 2 (L) 6 4 - 8 4 g/dL    Albumin 3 1 (L) 3 5 - 5 0 g/dL    Total Bilirubin 0 74 0 20 - 1 00 mg/dL    eGFR 92 ml/min/1 73sq m   CBC and differential    Collection Time: 01/19/23  5:29 AM   Result Value Ref Range    WBC 6 38 4 31 - 10 16 Thousand/uL    RBC 5 12 3 88 - 5 62 Million/uL    Hemoglobin 9 7 (L) 12 0 - 17 0 g/dL    Hematocrit 32 2 (L) 36 5 - 49 3 %    MCV 63 (L) 82 - 98 fL    MCH 18 9 (L) 26 8 - 34 3 pg    MCHC 30 1 (L) 31 4 - 37 4 g/dL    RDW 16 1 (H) 11 6 - 15 1 %    MPV 8 5 (L) 8 9 - 12 7 fL    Platelets 982 184 - 929 Thousands/uL    nRBC 0 /100 WBCs    Neutrophils Relative 65 43 - 75 %    Immat GRANS % 0 0 - 2 %    Lymphocytes Relative 17 14 - 44 %    Monocytes Relative 13 (H) 4 - 12 %    Eosinophils Relative 4 0 - 6 %    Basophils Relative 1 0 - 1 %    Neutrophils Absolute 4 15 1 85 - 7 62 Thousands/µL    Immature Grans Absolute 0 02 0 00 - 0 20 Thousand/uL    Lymphocytes Absolute 1 06 0 60 - 4 47 Thousands/µL    Monocytes Absolute 0 83 0 17 - 1 22 Thousand/µL    Eosinophils Absolute 0 23 0 00 - 0 61 Thousand/µL    Basophils Absolute 0 09 0 00 - 0 10 Thousands/µL   Comprehensive metabolic panel    Collection Time: 01/20/23  5:57 AM   Result Value Ref Range    Sodium 136 135 - 147 mmol/L    Potassium 3 7 3 5 - 5 3 mmol/L    Chloride 104 96 - 108 mmol/L    CO2 24 21 - 32 mmol/L    ANION GAP 8 4 - 13 mmol/L    BUN 6 5 - 25 mg/dL    Creatinine 0 91 0 60 - 1 30 mg/dL    Glucose 88 65 - 140 mg/dL    Calcium 8 4 8 3 - 10 1 mg/dL    Corrected Calcium 9 3 8 3 - 10 1 mg/dL    AST 20 5 - 45 U/L    ALT 26 12 - 78 U/L    Alkaline Phosphatase 84 46 - 116 U/L    Total Protein 6 0 (L) 6 4 - 8 4 g/dL    Albumin 2 9 (L) 3 5 - 5 0 g/dL    Total Bilirubin 0 48 0 20 - 1 00 mg/dL    eGFR 113 ml/min/1 73sq m   CBC and differential    Collection Time: 01/20/23  5:57 AM   Result Value Ref Range    WBC 6 73 4 31 - 10 16 Thousand/uL    RBC 4 86 3 88 - 5 62 Million/uL    Hemoglobin 9 2 (L) 12 0 - 17 0 g/dL    Hematocrit 30 5 (L) 36 5 - 49 3 %    MCV 63 (L) 82 - 98 fL    MCH 18 9 (L) 26 8 - 34 3 pg    MCHC 30 2 (L) 31 4 - 37 4 g/dL    RDW 16 0 (H) 11 6 - 15 1 %    MPV 8 5 (L) 8 9 - 12 7 fL    Platelets [phone] - 605 Thousands/uL    nRBC 0 /100 WBCs    Neutrophils Relative 63 43 - 75 %    Immat GRANS % 0 0 - 2 %    Lymphocytes Relative 16 14 - 44 %    Monocytes Relative 14 (H) 4 - 12 %    Eosinophils Relative 6 0 - 6 %    Basophils Relative 1 0 - 1 %    Neutrophils Absolute 4 23 1 85 - 7 62 Thousands/µL    Immature Grans Absolute 0 01 0 00 - 0 20 Thousand/uL    Lymphocytes Absolute 1 06 0 60 - 4 47 Thousands/µL    Monocytes Absolute 0 95 0 17 - 1 22 Thousand/µL    Eosinophils Absolute 0 42 0 00 - 0 61 Thousand/µL    Basophils Absolute 0 06 0 00 - 0 10 Thousands/µL   Comprehensive metabolic panel    Collection Time: 01/21/23  7:59 AM   Result Value Ref Range    Sodium 134 (L) 135 - 147 mmol/L    Potassium 3 3 (L) 3 5 - 5 3 mmol/L    Chloride 102 96 - 108 mmol/L    CO2 26 21 - 32 mmol/L    ANION GAP 6 4 - 13 mmol/L    BUN 3 (L) 5 - 25 mg/dL    Creatinine 0 90 0 60 - 1 30 mg/dL    Glucose 92 65 - 140 mg/dL    Calcium 8 5 8 3 - 10 1 mg/dL    Corrected Calcium 9 4 8 3 - 10 1 mg/dL    AST 17 5 - 45 U/L    ALT 28 12 - 78 U/L    Alkaline Phosphatase 88 46 - 116 U/L    Total Protein 5 9 (L) 6 4 - 8 4 g/dL    Albumin 2 9 (L) 3 5 - 5 0 g/dL    Total Bilirubin 0 39 0 20 - 1 00 mg/dL    eGFR 115 ml/min/1 73sq m   CBC and differential    Collection Time: 01/21/23  7:59 AM   Result Value Ref Range    WBC 6 02 4 31 - 10 16 Thousand/uL    RBC 4 78 3 88 - 5 62 Million/uL    Hemoglobin 9 0 (L) 12 0 - 17 0 g/dL    Hematocrit 30 1 (L) 36 5 - 49 3 %    MCV 63 (L) 82 - 98 fL    MCH 18 8 (L) 26 8 - 34 3 pg    MCHC 29 9 (L) 31 4 - 37 4 g/dL    RDW 15 9 (H) 11 6 - 15 1 %    MPV 8 5 (L) 8 9 - 12 7 fL    Platelets 569 414 - 953 Thousands/uL    nRBC 0 /100 WBCs    Neutrophils Relative 64 43 - 75 %    Immat GRANS % 0 0 - 2 %    Lymphocytes Relative 16 14 - 44 %    Monocytes Relative 14 (H) 4 - 12 %    Eosinophils Relative 5 0 - 6 %    Basophils Relative 1 0 - 1 %    Neutrophils Absolute 3 79 1 85 - 7 62 Thousands/µL    Immature Grans Absolute 0 02 0 00 - 0 20 Thousand/uL    Lymphocytes Absolute 0 99 0 60 - 4 47 Thousands/µL    Monocytes Absolute 0 86 0 17 - 1 22 Thousand/µL    Eosinophils Absolute 0 31 0 00 - 0 61 Thousand/µL    Basophils Absolute 0 05 0 00 - 0 10 Thousands/µL         All medications, consultant notes and nursing records reviewed  Lab, EKG, Telemetry, Imaging and other studies: I have personally reviewed pertinent reports  VTE Pharmacologic Prophylaxis: Enoxaparin (Lovenox)  VTE Mechanical Prophylaxis: sequential compression device    PT/OT/ST reviewed    Discussed with   Plan was coordinated with Nursing staff & Case management  Shared decision making care plan was discussed with family  Total time >30 minutes with >50 % of spent in coordination & counseling  Discharge time (if applicable) :    Portions of the record may have been created with voice recognition software  Occasional wrong word or "sound a like" substitutions may have occurred due to the inherent limitations of voice recognition software  Read the chart carefully and recognize, using context, where substitutions have occurred

## 2023-01-22 PROBLEM — K94.19 ALTERED BOWEL ELIMINATION DUE TO INTESTINAL OSTOMY (HCC): Status: ACTIVE | Noted: 2023-01-22

## 2023-01-22 RX ORDER — CHOLESTYRAMINE LIGHT 4 G/5.7G
4 POWDER, FOR SUSPENSION ORAL 2 TIMES DAILY
Status: DISCONTINUED | OUTPATIENT
Start: 2023-01-22 | End: 2023-01-23

## 2023-01-22 RX ADMIN — CHOLESTYRAMINE 4 G: 4 POWDER, FOR SUSPENSION ORAL at 17:35

## 2023-01-22 RX ADMIN — ENOXAPARIN SODIUM 40 MG: 40 INJECTION SUBCUTANEOUS at 08:50

## 2023-01-22 RX ADMIN — SODIUM CHLORIDE 125 ML/HR: 0.9 INJECTION, SOLUTION INTRAVENOUS at 20:19

## 2023-01-22 RX ADMIN — HYDROMORPHONE HYDROCHLORIDE 1 MG: 1 INJECTION, SOLUTION INTRAMUSCULAR; INTRAVENOUS; SUBCUTANEOUS at 16:17

## 2023-01-22 RX ADMIN — HYDROMORPHONE HYDROCHLORIDE 1 MG: 1 INJECTION, SOLUTION INTRAMUSCULAR; INTRAVENOUS; SUBCUTANEOUS at 11:43

## 2023-01-22 RX ADMIN — HYDROMORPHONE HYDROCHLORIDE 1 MG: 1 INJECTION, SOLUTION INTRAMUSCULAR; INTRAVENOUS; SUBCUTANEOUS at 07:22

## 2023-01-22 RX ADMIN — PANTOPRAZOLE SODIUM 40 MG: 40 INJECTION, POWDER, FOR SOLUTION INTRAVENOUS at 08:50

## 2023-01-22 RX ADMIN — HYDROMORPHONE HYDROCHLORIDE 1 MG: 1 INJECTION, SOLUTION INTRAMUSCULAR; INTRAVENOUS; SUBCUTANEOUS at 20:18

## 2023-01-22 RX ADMIN — DULOXETINE HYDROCHLORIDE 60 MG: 60 CAPSULE, DELAYED RELEASE ORAL at 08:50

## 2023-01-22 RX ADMIN — SODIUM CHLORIDE 125 ML/HR: 0.9 INJECTION, SOLUTION INTRAVENOUS at 13:00

## 2023-01-22 RX ADMIN — HYDROMORPHONE HYDROCHLORIDE 1 MG: 1 INJECTION, SOLUTION INTRAMUSCULAR; INTRAVENOUS; SUBCUTANEOUS at 02:51

## 2023-01-22 RX ADMIN — SODIUM CHLORIDE 125 ML/HR: 0.9 INJECTION, SOLUTION INTRAVENOUS at 02:55

## 2023-01-22 RX ADMIN — CHOLESTYRAMINE 4 G: 4 POWDER, FOR SUSPENSION ORAL at 11:43

## 2023-01-22 NOTE — PROGRESS NOTES
[address]  Progress Note - Beto Maradiaga 1995, 32 y o  male MRN: [id number]  Unit/Bed#: [address] [facility] Encounter: [id number]  Primary Care Provider: Rosanna Quintero DO   Date and time admitted to hospital: 1/18/2023  8:36 AM    Altered bowel elimination due to intestinal ostomy [facility])  Assessment & Plan  Unresolved, symptomatic    · Ostomy output 800 mL overnight bilious liquid  · GI recs for initiation of Questran  · Continue I/O    Abdominal pain  Assessment & Plan  Recurrent, improving slowly, patient followed by [facility] specialist in [facility]    · CT abdomen:No acute findings in the abdomen or pelvis  Right lower quadrant ileostomy  Reidentified fluid-filled J-pouch with persistent 3mild mucosal hyperemia  · Pain management  · GI consulted recs: -  CT a/p on admission-No acute findings in the abdomen or pelvis  Right lower quadrant ileostomy   Reidentified fluid-filled J-pouch with persistent 3mild mucosal hyperemia which has been an ongoing finding  -CRP was last 6 7 in Nov 2022, now with elevation of 43 5, also noted to have leukocytosis  -Surgical pathology was reviewed from small-bowel resection which showed -  Small intestine with active chronic enteritis, transmural defect (measuring 5cm) and with marked active serositis  Negative for dysplasia, negative for granuloma   Margin closer to perforation is viable and shows active chronic enteritis  Other margin is viable and shows acute serositis  Immunohistochemical stain for CMV is positive in rare cells    -Fecal calprotectin on last admission was less than 16  -Patient with acute onset and some leukocytosis with subjective fever and chills, will rule out infectious etiology, studies ordered, continue n p o , also will check for COVID/flu/RSV due to some upper respiratory complaints accompanying his gi complaints      -Strict CHELLE's to measure ileostomy output, advised patient we will need to avoid Imodium until infectious etiology has been     ruled    Electrolyte abnormality  Assessment & Plan  (Hyponatremia in the setting of nausea and vomiting as evidenced by Na 131, treated with NS bolus x3 and repeat BMP's)      Likely secondary to uncontrolled nausea vomiting, replenish sodium replenish sodium and chloride    · Replenish sodium and chloride Na/Cl  · IVF   · will trend      Chronic pain syndrome  Assessment & Plan  Recommend outpatient chronic pain management, very high tolerance and need for high pain medication    · Currently on Dilaudid   · patient refused Toradol        Ileostomy present [facility])  Assessment & Plan  Managed by [facility]    Patient reports changing ostomy bag every 3 to 4 days    · Revised 3 months ago, not nonproblematic  · I's and O's on output    Elevated C-reactive protein (CRP)  Assessment & Plan  Chronic,     · CRP-43 5 > trend if need to  · Likely multifactorial from infection versus, underlying autoimmune condition    Anemia  Assessment & Plan  Unresolved,    H&H 13 on admission, 9 0, likely multifactorial from GI blood loss and dilutional effect    · H&H trending  · Stool occult blood positive  · Repeat, iron studies; normal-11/22-pending  · Venofer [id number]    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,    · Followed by [facility], outpt  · GI consulted    CAR (generalized anxiety disorder)  Assessment & Plan  Chronic,     · Restarted home duloxetine  · As needed low-dose for anxiety          * Nausea vomiting and diarrhea  Assessment & Plan  PTA, unresolved    · IVF normal saline   · Stool cultures-pending  · Zofran, Immodium prn      SIRS (systemic inflammatory response syndrome) (HCC)-resolved as of 1/21/2023  Assessment & Plan  Evident by leukocytosis, tachycardia    · CT abdomen negative for acute findings  · CXR-pending  · Possible GI source  · Blood cultures-no growth 24 hours  · stool cultures-negative for bacteria, positive for occult blood  · UA--WNL  · Lactic acid-WNL/Pro-Rubin-pending  · Levaquin and metronidazole empirically- continue per GI recommendations  · Continue to monitor for signs of infecion      Subjective:   Patient seen and examined at bedside stated that he had tenderness in his abdomen, burning sensation and stabbed a stomach since admission  Patient reported to nurse that he had over 800 mL on output in his ostomy bag  Patient reported that it was bile-like and put into his bag  Patient informed due to output will need GI recommendations regarding discharge, patient is initiated on Questran  Objective:   Vitals: Blood pressure 116/78, pulse 86, temperature 97 6 °F (36 4 °C), temperature source Oral, resp  rate 16, height 5' 9" (1 753 m), weight 81 6 kg (180 lb), SpO2 98 %  ,Body mass index is 26 58 kg/m²  Intake/Output Summary (Last 24 hours) at 1/22/2023 1438  Last data filed at 1/22/2023 0702  Gross per 24 hour   Intake 1375 ml   Output 3450 ml   Net -2075 ml       Physical Exam:   Physical Exam  Vitals reviewed  Constitutional:       General: He is not in acute distress  Appearance: Normal appearance  HENT:      Head: Atraumatic  Nose: No congestion  Eyes:      General: No scleral icterus  Pupils: Pupils are equal, round, and reactive to light  Cardiovascular:      Rate and Rhythm: Normal rate  Heart sounds: No murmur heard  Pulmonary:      Effort: No respiratory distress  Breath sounds: No wheezing, rhonchi or rales  Abdominal:      Palpations: Abdomen is soft  Tenderness: There is no abdominal tenderness  There is no guarding  Comments: Abdominal ostomy, insertion site CDI   Musculoskeletal:         General: No swelling or deformity  Normal range of motion  Cervical back: No tenderness  Right lower leg: No edema  Left lower leg: No edema  Feet:      Right foot:      Skin integrity: No callus  Skin:     General: Skin is warm        Capillary Refill: Capillary refill takes less than 2 seconds  Findings: No lesion or rash  Neurological:      Mental Status: He is oriented to person, place, and time  Cranial Nerves: No cranial nerve deficit  Coordination: Coordination normal    Psychiatric:         Mood and Affect: Mood normal          Behavior: Behavior normal          Thought Content:  Thought content normal                      Recent Results (from the past 96 hour(s))   Rotavirus antigen, stool    Collection Time: 01/18/23  5:05 PM   Result Value Ref Range    Rotavirus Negative Negative   Occult blood 1-3, stool    Collection Time: 01/18/23  5:05 PM   Result Value Ref Range    Fecal Occult Blood Diagnostic Positive (A) Negative    Fecal Occult Blood Diagnostic 2 Test not performed Negative    Fecal Occult Blood Diagnostic 3 Test not performed Negative   Stool Enteric Bacterial Panel by PCR    Collection Time: 01/18/23  5:05 PM    Specimen: Per Stoma; Stool   Result Value Ref Range    Salmonella sp PCR None Detected None Detected    Shigella sp/Enteroinvasive E  coli (EIEC) PCR None Detected None Detected    Campylobacter sp (jejuni and coli) PCR None Detected None Detected    Shiga toxin 1/Shiga toxin 2 genes PCR None Detected None Detected   Clostridium difficile toxin by PCR with EIA    Collection Time: 01/18/23  5:05 PM    Specimen: Per Stoma; Stool   Result Value Ref Range    C difficile toxin by PCR Negative Negative   COVID/FLU/RSV    Collection Time: 01/18/23  5:05 PM    Specimen: Nose; Nares   Result Value Ref Range    SARS-CoV-2 Negative Negative    INFLUENZA A PCR Negative Negative    INFLUENZA B PCR Negative Negative    RSV PCR Negative Negative   Comprehensive metabolic panel    Collection Time: 01/19/23  5:29 AM   Result Value Ref Range    Sodium 137 135 - 147 mmol/L    Potassium 3 4 (L) 3 5 - 5 3 mmol/L    Chloride 103 96 - 108 mmol/L    CO2 22 21 - 32 mmol/L    ANION GAP 12 4 - 13 mmol/L    BUN 8 5 - 25 mg/dL    Creatinine 1 08 0 60 - 1 30 mg/dL    Glucose 75 65 - 140 mg/dL    Calcium 8 5 8 3 - 10 1 mg/dL    Corrected Calcium 9 2 8 3 - 10 1 mg/dL    AST 19 5 - 45 U/L    ALT 32 12 - 78 U/L    Alkaline Phosphatase 89 46 - 116 U/L    Total Protein 6 2 (L) 6 4 - 8 4 g/dL    Albumin 3 1 (L) 3 5 - 5 0 g/dL    Total Bilirubin 0 74 0 20 - 1 00 mg/dL    eGFR 92 ml/min/1 73sq m   CBC and differential    Collection Time: 01/19/23  5:29 AM   Result Value Ref Range    WBC 6 38 4 31 - 10 16 Thousand/uL    RBC 5 12 3 88 - 5 62 Million/uL    Hemoglobin 9 7 (L) 12 0 - 17 0 g/dL    Hematocrit 32 2 (L) 36 5 - 49 3 %    MCV 63 (L) 82 - 98 fL    MCH 18 9 (L) 26 8 - 34 3 pg    MCHC 30 1 (L) 31 4 - 37 4 g/dL    RDW 16 1 (H) 11 6 - 15 1 %    MPV 8 5 (L) 8 9 - 12 7 fL    Platelets 411 266 - 768 Thousands/uL    nRBC 0 /100 WBCs    Neutrophils Relative 65 43 - 75 %    Immat GRANS % 0 0 - 2 %    Lymphocytes Relative 17 14 - 44 %    Monocytes Relative 13 (H) 4 - 12 %    Eosinophils Relative 4 0 - 6 %    Basophils Relative 1 0 - 1 %    Neutrophils Absolute 4 15 1 85 - 7 62 Thousands/µL    Immature Grans Absolute 0 02 0 00 - 0 20 Thousand/uL    Lymphocytes Absolute 1 06 0 60 - 4 47 Thousands/µL    Monocytes Absolute 0 83 0 17 - 1 22 Thousand/µL    Eosinophils Absolute 0 23 0 00 - 0 61 Thousand/µL    Basophils Absolute 0 09 0 00 - 0 10 Thousands/µL   Comprehensive metabolic panel    Collection Time: 01/20/23  5:57 AM   Result Value Ref Range    Sodium 136 135 - 147 mmol/L    Potassium 3 7 3 5 - 5 3 mmol/L    Chloride 104 96 - 108 mmol/L    CO2 24 21 - 32 mmol/L    ANION GAP 8 4 - 13 mmol/L    BUN 6 5 - 25 mg/dL    Creatinine 0 91 0 60 - 1 30 mg/dL    Glucose 88 65 - 140 mg/dL    Calcium 8 4 8 3 - 10 1 mg/dL    Corrected Calcium 9 3 8 3 - 10 1 mg/dL    AST 20 5 - 45 U/L    ALT 26 12 - 78 U/L    Alkaline Phosphatase 84 46 - 116 U/L    Total Protein 6 0 (L) 6 4 - 8 4 g/dL    Albumin 2 9 (L) 3 5 - 5 0 g/dL    Total Bilirubin 0 48 0 20 - 1 00 mg/dL    eGFR 113 ml/min/1 73sq m   CBC and differential Collection Time: 01/20/23  5:57 AM   Result Value Ref Range    WBC 6 73 4 31 - 10 16 Thousand/uL    RBC 4 86 3 88 - 5 62 Million/uL    Hemoglobin 9 2 (L) 12 0 - 17 0 g/dL    Hematocrit 30 5 (L) 36 5 - 49 3 %    MCV 63 (L) 82 - 98 fL    MCH 18 9 (L) 26 8 - 34 3 pg    MCHC 30 2 (L) 31 4 - 37 4 g/dL    RDW 16 0 (H) 11 6 - 15 1 %    MPV 8 5 (L) 8 9 - 12 7 fL    Platelets 026 505 - 159 Thousands/uL    nRBC 0 /100 WBCs    Neutrophils Relative 63 43 - 75 %    Immat GRANS % 0 0 - 2 %    Lymphocytes Relative 16 14 - 44 %    Monocytes Relative 14 (H) 4 - 12 %    Eosinophils Relative 6 0 - 6 %    Basophils Relative 1 0 - 1 %    Neutrophils Absolute 4 23 1 85 - 7 62 Thousands/µL    Immature Grans Absolute 0 01 0 00 - 0 20 Thousand/uL    Lymphocytes Absolute 1 06 0 60 - 4 47 Thousands/µL    Monocytes Absolute 0 95 0 17 - 1 22 Thousand/µL    Eosinophils Absolute 0 42 0 00 - 0 61 Thousand/µL    Basophils Absolute 0 06 0 00 - 0 10 Thousands/µL   Comprehensive metabolic panel    Collection Time: 01/21/23  7:59 AM   Result Value Ref Range    Sodium 134 (L) 135 - 147 mmol/L    Potassium 3 3 (L) 3 5 - 5 3 mmol/L    Chloride 102 96 - 108 mmol/L    CO2 26 21 - 32 mmol/L    ANION GAP 6 4 - 13 mmol/L    BUN 3 (L) 5 - 25 mg/dL    Creatinine 0 90 0 60 - 1 30 mg/dL    Glucose 92 65 - 140 mg/dL    Calcium 8 5 8 3 - 10 1 mg/dL    Corrected Calcium 9 4 8 3 - 10 1 mg/dL    AST 17 5 - 45 U/L    ALT 28 12 - 78 U/L    Alkaline Phosphatase 88 46 - 116 U/L    Total Protein 5 9 (L) 6 4 - 8 4 g/dL    Albumin 2 9 (L) 3 5 - 5 0 g/dL    Total Bilirubin 0 39 0 20 - 1 00 mg/dL    eGFR 115 ml/min/1 73sq m   CBC and differential    Collection Time: 01/21/23  7:59 AM   Result Value Ref Range    WBC 6 02 4 31 - 10 16 Thousand/uL    RBC 4 78 3 88 - 5 62 Million/uL    Hemoglobin 9 0 (L) 12 0 - 17 0 g/dL    Hematocrit 30 1 (L) 36 5 - 49 3 %    MCV 63 (L) 82 - 98 fL    MCH 18 8 (L) 26 8 - 34 3 pg    MCHC 29 9 (L) 31 4 - 37 4 g/dL    RDW 15 9 (H) 11 6 - 15 1 %    MPV 8 5 (L) 8 9 - 12 7 fL    Platelets 090 053 - 121 Thousands/uL    nRBC 0 /100 WBCs    Neutrophils Relative 64 43 - 75 %    Immat GRANS % 0 0 - 2 %    Lymphocytes Relative 16 14 - 44 %    Monocytes Relative 14 (H) 4 - 12 %    Eosinophils Relative 5 0 - 6 %    Basophils Relative 1 0 - 1 %    Neutrophils Absolute 3 79 1 85 - 7 62 Thousands/µL    Immature Grans Absolute 0 02 0 00 - 0 20 Thousand/uL    Lymphocytes Absolute 0 99 0 60 - 4 47 Thousands/µL    Monocytes Absolute 0 86 0 17 - 1 22 Thousand/µL    Eosinophils Absolute 0 31 0 00 - 0 61 Thousand/µL    Basophils Absolute 0 05 0 00 - 0 10 Thousands/µL   Iron Saturation %    Collection Time: 01/21/23  7:59 AM   Result Value Ref Range    Iron Saturation 16 (L) 20 - 50 %    TIBC 265 250 - 450 ug/dL    Iron 43 (L) 65 - 175 ug/dL   Ferritin    Collection Time: 01/21/23  7:59 AM   Result Value Ref Range    Ferritin 151 8 - 388 ng/mL         All medications, consultant notes and nursing records reviewed  Lab, EKG, Telemetry, Imaging and other studies: I have personally reviewed pertinent reports  VTE Pharmacologic Prophylaxis: Enoxaparin (Lovenox)  VTE Mechanical Prophylaxis: sequential compression device    PT/OT/ST reviewed  Discussed with GI  Plan was coordinated with Nursing staff & Case management  Shared decision making care plan was discussed with family  Total time >30 minutes with >50 % of spent in coordination & counseling  Discharge time (if applicable) :    Portions of the record may have been created with voice recognition software  Occasional wrong word or "sound a like" substitutions may have occurred due to the inherent limitations of voice recognition software  Read the chart carefully and recognize, using context, where substitutions have occurred

## 2023-01-22 NOTE — PLAN OF CARE
Problem: PAIN - ADULT  Goal: Verbalizes/displays adequate comfort level or baseline comfort level  Description: Interventions:  - Encourage patient to monitor pain and request assistance  - Assess pain using appropriate pain scale  - Administer analgesics based on type and severity of pain and evaluate response  - Implement non-pharmacological measures as appropriate and evaluate response  - Consider cultural and social influences on pain and pain management  - Notify physician/advanced practitioner if interventions unsuccessful or patient reports new pain  Outcome: Progressing     Problem: DISCHARGE PLANNING  Goal: Discharge to home or other facility with appropriate resources  Description: INTERVENTIONS:  - Identify barriers to discharge w/patient   - Arrange for needed discharge resources and transportation as appropriate  - Identify discharge learning needs (meds, wound care, etc )  - Refer to Case Management Department for coordinating discharge planning if the patient needs post-hospital services based on physician/advanced practitioner order or complex needs related to functional status, cognitive ability, or social support system  Outcome: Progressing     Problem: Knowledge Deficit  Goal: Patient/family/caregiver demonstrates understanding of disease process, treatment plan, medications, and discharge instructions  Description: Complete learning assessment and assess knowledge base    Interventions:  - Provide teaching at level of understanding  - Provide teaching via preferred learning methods  Outcome: Progressing

## 2023-01-22 NOTE — ASSESSMENT & PLAN NOTE
Unresolved, symptomatic    · Ostomy output 1,550 mL overnight bilious liquid  · GI recs for initiation of Questran  · Continue I/O

## 2023-01-22 NOTE — PLAN OF CARE
Problem: PAIN - ADULT  Goal: Verbalizes/displays adequate comfort level or baseline comfort level  Description: Interventions:  - Encourage patient to monitor pain and request assistance  - Assess pain using appropriate pain scale  - Administer analgesics based on type and severity of pain and evaluate response  - Implement non-pharmacological measures as appropriate and evaluate response  - Consider cultural and social influences on pain and pain management  - Notify physician/advanced practitioner if interventions unsuccessful or patient reports new pain  Outcome: Progressing     Problem: INFECTION - ADULT  Goal: Absence or prevention of progression during hospitalization  Description: INTERVENTIONS:  - Assess and monitor for signs and symptoms of infection  - Monitor lab/diagnostic results  - Monitor all insertion sites, i e  indwelling lines, tubes, and drains  - Monitor endotracheal if appropriate and nasal secretions for changes in amount and color  - [place] appropriate cooling/warming therapies per order  - Administer medications as ordered  - Instruct and encourage patient and family to use good hand hygiene technique  - Identify and instruct in appropriate isolation precautions for identified infection/condition  Outcome: Progressing     Problem: SAFETY ADULT  Goal: Patient will remain free of falls  Description: INTERVENTIONS:  - Educate patient/family on patient safety including physical limitations  - Instruct patient to call for assistance with activity   - Consult OT/PT to assist with strengthening/mobility   - Keep Call bell within reach  - Keep bed low and locked with side rails adjusted as appropriate  - Keep care items and personal belongings within reach  - Initiate and maintain comfort rounds  - Make Fall Risk Sign visible to staff  - Offer Toileting every 2 Hours, in advance of need  - Apply yellow socks and bracelet for high fall risk patients  - Consider moving patient to room near nurses station  Outcome: Progressing  Goal: Maintain or return to baseline ADL function  Description: INTERVENTIONS:  -  Assess patient's ability to carry out ADLs; assess patient's baseline for ADL function and identify physical deficits which impact ability to perform ADLs (bathing, care of mouth/teeth, toileting, grooming, dressing, etc )  - Assess/evaluate cause of self-care deficits   - Assess range of motion  - Assess patient's mobility; develop plan if impaired  - Assess patient's need for assistive devices and provide as appropriate  - Encourage maximum independence but intervene and supervise when necessary  - Involve family in performance of ADLs  - Assess for home care needs following discharge   - Consider OT consult to assist with ADL evaluation and planning for discharge  - Provide patient education as appropriate  Outcome: Progressing  Goal: Maintains/Returns to pre admission functional level  Description: INTERVENTIONS:  - Perform BMAT or MOVE assessment daily    - Set and communicate daily mobility goal to care team and patient/family/caregiver  - Collaborate with rehabilitation services on mobility goals if consulted  - Perform Range of Motion 3 times a day  - Reposition patient every 3 hours    - Dangle patient 3 times a day  - Stand patient 3 times a day  - Ambulate patient 3 times a day  - Out of bed to chair 3 times a day   - Out of bed for meals 3 times a day  - Out of bed for toileting  - Record patient progress and toleration of activity level   Outcome: Progressing     Problem: DISCHARGE PLANNING  Goal: Discharge to home or other facility with appropriate resources  Description: INTERVENTIONS:  - Identify barriers to discharge w/patient and caregiver  - Arrange for needed discharge resources and transportation as appropriate  - Identify discharge learning needs (meds, wound care, etc )  - Arrange for interpretive services to assist at discharge as needed  - Refer to Case Management Department for coordinating discharge planning if the patient needs post-hospital services based on physician/advanced practitioner order or complex needs related to functional status, cognitive ability, or social support system  Outcome: Progressing     Problem: Knowledge Deficit  Goal: Patient/family/caregiver demonstrates understanding of disease process, treatment plan, medications, and discharge instructions  Description: Complete learning assessment and assess knowledge base    Interventions:  - Provide teaching at level of understanding  - Provide teaching via preferred learning methods  Outcome: Progressing

## 2023-01-23 LAB
BACTERIA BLD CULT: NORMAL
BACTERIA BLD CULT: NORMAL

## 2023-01-23 RX ORDER — CHOLESTYRAMINE LIGHT 4 G/5.7G
8 POWDER, FOR SUSPENSION ORAL 2 TIMES DAILY
Status: DISCONTINUED | OUTPATIENT
Start: 2023-01-23 | End: 2023-01-24 | Stop reason: HOSPADM

## 2023-01-23 RX ADMIN — SODIUM CHLORIDE 125 ML/HR: 0.9 INJECTION, SOLUTION INTRAVENOUS at 16:58

## 2023-01-23 RX ADMIN — CHOLESTYRAMINE 4 G: 4 POWDER, FOR SUSPENSION ORAL at 09:25

## 2023-01-23 RX ADMIN — PANTOPRAZOLE SODIUM 40 MG: 40 INJECTION, POWDER, FOR SOLUTION INTRAVENOUS at 09:25

## 2023-01-23 RX ADMIN — HYDROMORPHONE HYDROCHLORIDE 1 MG: 1 INJECTION, SOLUTION INTRAMUSCULAR; INTRAVENOUS; SUBCUTANEOUS at 13:30

## 2023-01-23 RX ADMIN — HYDROMORPHONE HYDROCHLORIDE 1 MG: 1 INJECTION, SOLUTION INTRAMUSCULAR; INTRAVENOUS; SUBCUTANEOUS at 00:42

## 2023-01-23 RX ADMIN — HYDROMORPHONE HYDROCHLORIDE 1 MG: 1 INJECTION, SOLUTION INTRAMUSCULAR; INTRAVENOUS; SUBCUTANEOUS at 09:25

## 2023-01-23 RX ADMIN — HYDROMORPHONE HYDROCHLORIDE 1 MG: 1 INJECTION, SOLUTION INTRAMUSCULAR; INTRAVENOUS; SUBCUTANEOUS at 04:42

## 2023-01-23 RX ADMIN — HYDROMORPHONE HYDROCHLORIDE 1 MG: 1 INJECTION, SOLUTION INTRAMUSCULAR; INTRAVENOUS; SUBCUTANEOUS at 22:07

## 2023-01-23 RX ADMIN — SODIUM CHLORIDE 125 ML/HR: 0.9 INJECTION, SOLUTION INTRAVENOUS at 04:34

## 2023-01-23 RX ADMIN — ENOXAPARIN SODIUM 40 MG: 40 INJECTION SUBCUTANEOUS at 09:25

## 2023-01-23 RX ADMIN — CHOLESTYRAMINE 8 G: 4 POWDER, FOR SUSPENSION ORAL at 17:43

## 2023-01-23 RX ADMIN — DULOXETINE HYDROCHLORIDE 60 MG: 60 CAPSULE, DELAYED RELEASE ORAL at 09:25

## 2023-01-23 RX ADMIN — HYDROMORPHONE HYDROCHLORIDE 1 MG: 1 INJECTION, SOLUTION INTRAMUSCULAR; INTRAVENOUS; SUBCUTANEOUS at 17:57

## 2023-01-23 NOTE — PROGRESS NOTES
Julian 43  Progress Note - Chichi Salcido 1995, 32 y o  male MRN: [id number]  Unit/Bed#: [address] [facility] Encounter: [id number]  Primary Care Provider: Yanet Amin DO   Date and time admitted to hospital: 1/18/2023  8:36 AM    Altered bowel elimination due to intestinal ostomy [facility])  Assessment & Plan  Unresolved, symptomatic    · Ostomy output 1,550 mL overnight bilious liquid  · GI recs for initiation of Questran  · Continue I/O    Abdominal pain  Assessment & Plan  Recurrent, improving slowly, patient followed by [facility] specialist in [facility]    · CT abdomen:No acute findings in the abdomen or pelvis  Right lower quadrant ileostomy  Reidentified fluid-filled J-pouch with persistent 3mild mucosal hyperemia  · Pain management  · GI consulted recs: -  CT a/p on admission-No acute findings in the abdomen or pelvis  Right lower quadrant ileostomy   Reidentified fluid-filled J-pouch with persistent 3mild mucosal hyperemia which has been an ongoing finding  -CRP was last 6 7 in Nov 2022, now with elevation of 43 5, also noted to have leukocytosis  -Surgical pathology was reviewed from small-bowel resection which showed -  Small intestine with active chronic enteritis, transmural defect (measuring 5cm) and with marked active serositis  Negative for dysplasia, negative for granuloma   Margin closer to perforation is viable and shows active chronic enteritis  Other margin is viable and shows acute serositis  Immunohistochemical stain for CMV is positive in rare cells    -Fecal calprotectin on last admission was less than 16  -Patient with acute onset and some leukocytosis with subjective fever and chills, will rule out infectious etiology, studies ordered, continue n p o , also will check for COVID/flu/RSV due to some upper respiratory complaints accompanying his gi complaints      -Strict CHELLE's to measure ileostomy output, advised patient we will need to avoid Imodium until infectious etiology has been     ruled    Electrolyte abnormality  Assessment & Plan  (Hyponatremia in the setting of nausea and vomiting as evidenced by Na 131, treated with NS bolus x3 and repeat BMP's)      Likely secondary to uncontrolled nausea vomiting, replenish sodium replenish sodium and chloride    · Replenish sodium and chloride Na/Cl  · IVF   · will trend      Chronic pain syndrome  Assessment & Plan  Recommend outpatient chronic pain management, very high tolerance and need for high pain medication    · Currently on Dilaudid   · patient refused Toradol        Ileostomy present [facility])  Assessment & Plan  Managed by [facility]    Patient reports changing ostomy bag every 3 to 4 days    · Revised 3 months ago, not nonproblematic  · I's and O's on output    Elevated C-reactive protein (CRP)  Assessment & Plan  Chronic,     · CRP-43 5 > trend if need to  · Likely multifactorial from infection versus, underlying autoimmune condition    Anemia  Assessment & Plan  Unresolved,    H&H 13 on admission, 9 0, likely multifactorial from GI blood loss and dilutional effect    · H&H trending  · Stool occult blood positive  · Repeat, iron studies; normal-11/22-pending  · Venofer [id number]    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,    · Followed by [facility], outpt  · GI consulted    CAR (generalized anxiety disorder)  Assessment & Plan  Chronic,     · Restarted home duloxetine  · As needed low-dose for anxiety          * Nausea vomiting and diarrhea  Assessment & Plan  PTA, nausea resolved, diarrhea-unresolved    · IVF normal saline   · Stool cultures- unremarkable  · Zofran, hold Immodium for output measures      SIRS (systemic inflammatory response syndrome) (HCC)-resolved as of 1/21/2023  Assessment & Plan  Evident by leukocytosis, tachycardia    · CT abdomen negative for acute findings  · CXR-pending  · Possible GI source  · Blood cultures-no growth 24 hours  · stool cultures-negative for bacteria, positive for occult blood  · UA--WNL  · Lactic acid-WNL/Pro-Rubin-pending  · Levaquin and metronidazole empirically- continue per GI recommendations  · Continue to monitor for signs of infecion      Subjective:   Patient seen and examined at bedside stated     Objective:   Vitals: Blood pressure 131/89, pulse 70, temperature (!) 97 4 °F (36 3 °C), temperature source Oral, resp  rate 16, height 5' 9" (1 753 m), weight 81 6 kg (180 lb), SpO2 99 %  ,Body mass index is 26 58 kg/m²  Intake/Output Summary (Last 24 hours) at 1/23/2023 0917  Last data filed at 1/23/2023 8966  Gross per 24 hour   Intake 1500 ml   Output 1550 ml   Net -50 ml       Physical Exam:   Physical Exam  Vitals reviewed  Constitutional:       General: He is not in acute distress  Appearance: He is ill-appearing  HENT:      Head: Atraumatic  Nose: No congestion  Eyes:      General: No scleral icterus  Pupils: Pupils are equal, round, and reactive to light  Cardiovascular:      Rate and Rhythm: Normal rate  Heart sounds: No murmur heard  Pulmonary:      Effort: No respiratory distress  Breath sounds: No wheezing, rhonchi or rales  Abdominal:      General: There is no distension  Palpations: Abdomen is soft  Tenderness: There is no abdominal tenderness  There is no guarding or rebound  Hernia: No hernia is present  Comments: Ostomy in place margins CDI, acute tenderness   Musculoskeletal:         General: No swelling or deformity  Normal range of motion  Cervical back: No tenderness  Right lower leg: No edema  Left lower leg: No edema  Feet:      Right foot:      Skin integrity: No callus  Skin:     General: Skin is warm  Capillary Refill: Capillary refill takes more than 3 seconds  Findings: No lesion or rash  Neurological:      Mental Status: He is oriented to person, place, and time  Cranial Nerves: No cranial nerve deficit        Coordination: Coordination normal  Psychiatric:         Mood and Affect: Mood normal          Thought Content:  Thought content normal                      Recent Results (from the past 96 hour(s))   Comprehensive metabolic panel    Collection Time: 01/20/23  5:57 AM   Result Value Ref Range    Sodium 136 135 - 147 mmol/L    Potassium 3 7 3 5 - 5 3 mmol/L    Chloride 104 96 - 108 mmol/L    CO2 24 21 - 32 mmol/L    ANION GAP 8 4 - 13 mmol/L    BUN 6 5 - 25 mg/dL    Creatinine 0 91 0 60 - 1 30 mg/dL    Glucose 88 65 - 140 mg/dL    Calcium 8 4 8 3 - 10 1 mg/dL    Corrected Calcium 9 3 8 3 - 10 1 mg/dL    AST 20 5 - 45 U/L    ALT 26 12 - 78 U/L    Alkaline Phosphatase 84 46 - 116 U/L    Total Protein 6 0 (L) 6 4 - 8 4 g/dL    Albumin 2 9 (L) 3 5 - 5 0 g/dL    Total Bilirubin 0 48 0 20 - 1 00 mg/dL    eGFR 113 ml/min/1 73sq m   CBC and differential    Collection Time: 01/20/23  5:57 AM   Result Value Ref Range    WBC 6 73 4 31 - 10 16 Thousand/uL    RBC 4 86 3 88 - 5 62 Million/uL    Hemoglobin 9 2 (L) 12 0 - 17 0 g/dL    Hematocrit 30 5 (L) 36 5 - 49 3 %    MCV 63 (L) 82 - 98 fL    MCH 18 9 (L) 26 8 - 34 3 pg    MCHC 30 2 (L) 31 4 - 37 4 g/dL    RDW 16 0 (H) 11 6 - 15 1 %    MPV 8 5 (L) 8 9 - 12 7 fL    Platelets 214 107 - 308 Thousands/uL    nRBC 0 /100 WBCs    Neutrophils Relative 63 43 - 75 %    Immat GRANS % 0 0 - 2 %    Lymphocytes Relative 16 14 - 44 %    Monocytes Relative 14 (H) 4 - 12 %    Eosinophils Relative 6 0 - 6 %    Basophils Relative 1 0 - 1 %    Neutrophils Absolute 4 23 1 85 - 7 62 Thousands/µL    Immature Grans Absolute 0 01 0 00 - 0 20 Thousand/uL    Lymphocytes Absolute 1 06 0 60 - 4 47 Thousands/µL    Monocytes Absolute 0 95 0 17 - 1 22 Thousand/µL    Eosinophils Absolute 0 42 0 00 - 0 61 Thousand/µL    Basophils Absolute 0 06 0 00 - 0 10 Thousands/µL   Comprehensive metabolic panel    Collection Time: 01/21/23  7:59 AM   Result Value Ref Range    Sodium 134 (L) 135 - 147 mmol/L    Potassium 3 3 (L) 3 5 - 5 3 mmol/L    Chloride 102 96 - 108 mmol/L    CO2 26 21 - 32 mmol/L    ANION GAP 6 4 - 13 mmol/L    BUN 3 (L) 5 - 25 mg/dL    Creatinine 0 90 0 60 - 1 30 mg/dL    Glucose 92 65 - 140 mg/dL    Calcium 8 5 8 3 - 10 1 mg/dL    Corrected Calcium 9 4 8 3 - 10 1 mg/dL    AST 17 5 - 45 U/L    ALT 28 12 - 78 U/L    Alkaline Phosphatase 88 46 - 116 U/L    Total Protein 5 9 (L) 6 4 - 8 4 g/dL    Albumin 2 9 (L) 3 5 - 5 0 g/dL    Total Bilirubin 0 39 0 20 - 1 00 mg/dL    eGFR 115 ml/min/1 73sq m   CBC and differential    Collection Time: 01/21/23  7:59 AM   Result Value Ref Range    WBC 6 02 4 31 - 10 16 Thousand/uL    RBC 4 78 3 88 - 5 62 Million/uL    Hemoglobin 9 0 (L) 12 0 - 17 0 g/dL    Hematocrit 30 1 (L) 36 5 - 49 3 %    MCV 63 (L) 82 - 98 fL    MCH 18 8 (L) 26 8 - 34 3 pg    MCHC 29 9 (L) 31 4 - 37 4 g/dL    RDW 15 9 (H) 11 6 - 15 1 %    MPV 8 5 (L) 8 9 - 12 7 fL    Platelets 318 313 - 914 Thousands/uL    nRBC 0 /100 WBCs    Neutrophils Relative 64 43 - 75 %    Immat GRANS % 0 0 - 2 %    Lymphocytes Relative 16 14 - 44 %    Monocytes Relative 14 (H) 4 - 12 %    Eosinophils Relative 5 0 - 6 %    Basophils Relative 1 0 - 1 %    Neutrophils Absolute 3 79 1 85 - 7 62 Thousands/µL    Immature Grans Absolute 0 02 0 00 - 0 20 Thousand/uL    Lymphocytes Absolute 0 99 0 60 - 4 47 Thousands/µL    Monocytes Absolute 0 86 0 17 - 1 22 Thousand/µL    Eosinophils Absolute 0 31 0 00 - 0 61 Thousand/µL    Basophils Absolute 0 05 0 00 - 0 10 Thousands/µL   Iron Saturation %    Collection Time: 01/21/23  7:59 AM   Result Value Ref Range    Iron Saturation 16 (L) 20 - 50 %    TIBC 265 250 - 450 ug/dL    Iron 43 (L) 65 - 175 ug/dL   Ferritin    Collection Time: 01/21/23  7:59 AM   Result Value Ref Range    Ferritin 151 8 - 388 ng/mL         All medications, consultant notes and nursing records reviewed  Lab, EKG, Telemetry, Imaging and other studies: I have personally reviewed pertinent reports      VTE Pharmacologic Prophylaxis: Enoxaparin (Lovenox)  VTE Mechanical Prophylaxis: sequential compression device    PT/OT/ST reviewed  Discussed with GI, PCP  Plan was coordinated with Nursing staff & Case management  Shared decision making care plan was discussed with family  Total time >30 minutes with >50 % of spent in coordination & counseling  Discharge time (if applicable) :    Portions of the record may have been created with voice recognition software  Occasional wrong word or "sound a like" substitutions may have occurred due to the inherent limitations of voice recognition software  Read the chart carefully and recognize, using context, where substitutions have occurred

## 2023-01-23 NOTE — PLAN OF CARE
Problem: PAIN - ADULT  Goal: Verbalizes/displays adequate comfort level or baseline comfort level  Description: Interventions:  - Encourage patient to monitor pain and request assistance  - Assess pain using appropriate pain scale  - Administer analgesics based on type and severity of pain and evaluate response  - Implement non-pharmacological measures as appropriate and evaluate response  - Consider cultural and social influences on pain and pain management  - Notify physician/advanced practitioner if interventions unsuccessful or patient reports new pain  Outcome: Progressing     Problem: SAFETY ADULT  Goal: Patient will remain free of falls  Description: INTERVENTIONS:  - Educate patient/family on patient safety including physical limitations  - Instruct patient to call for assistance with activity   - Consult OT/PT to assist with strengthening/mobility   - Keep Call bell within reach  - Keep bed low and locked with side rails adjusted as appropriate  - Keep care items and personal belongings within reach  - Initiate and maintain comfort rounds  - Make Fall Risk Sign visible to staff  - Offer Toileting every 2 Hours, in advance of need  - Apply yellow socks and bracelet for high fall risk patients  - Consider moving patient to room near nurses station  Outcome: Progressing     Problem: Knowledge Deficit  Goal: Patient/family/caregiver demonstrates understanding of disease process, treatment plan, medications, and discharge instructions  Description: Complete learning assessment and assess knowledge base    Interventions:  - Provide teaching at level of understanding  - Provide teaching via preferred learning methods  Outcome: Progressing

## 2023-01-23 NOTE — PROGRESS NOTES
Progress Note- Hardeep Lopes 32 y o  male MRN: [id number]    Unit/Bed#: [address] [address] Encounter: [id number]      Assessment and Plan:    Abdominal pain  Patient presents abdominal pain and increased ileostomy output- complex PMH including IBD status post colectomy with J-pouch formation, proctitis, recent surgery 10/18 for diverting ileostomy complicated by prolapse and localized intra-abdominal infection   Plan for reconstruction of pouch in April at [facility], scheduled for April 20  EGD 1/20 due to regular NSAID use/abdominal pain showed erosive gastritis and duodenal erosions  Biopsies pending  -CT a/p on admission-No acute findings in the abdomen or pelvis  Right lower quadrant ileostomy   Reidentified fluid-filled J-pouch with persistent 3mild mucosal hyperemia which has been an ongoing finding  -CRP was last 6 7 in Nov 2022, now with elevation of 43 5, also noted to have leukocytosis on admission which now has resolved  -Fecal calprotectin on last admission was less than 16  -Patient with acute onset and some leukocytosis with subjective fever and chills, will rule out infectious etiology, O+P still in process  Neg for covid/flu/rsv, C diff, Enteric panel, Rotavirus antigen  -Strict I and O's to measure ileostomy output-3L Saturday, 1 5L Sunday  Feels slightly less output today  -Increase Questran to 8g BID, change diet to low fiber/low residue, lactose restricted  Advised pt to restrict sugar intake as he had a bag of candy at bedside   -Can consider Imodium 1/2 hour prior to meals, subq octreotide for high output ileostomy pending course  -Await EGD biopsy results  -Avoid NSAIDs-will need to follow up w/ rheum regarding treatment of ankylosing spondylitis  -Continue PPI    ______________________________________________________________________    Subjective:     Continues with burning type pain near ileostomy  Tolerating diet w/o N/V   Ileostomy output is still increased from prior but less green and may be slightly less than yesterday  He reports he was taking Imodium intermittently at home but wasn't having high output on a regular basis  He was given a powder from colorectal surgeons but doesn't know what it was called-he will find out  Medication Administration - last 24 hours from 01/22/2023 1604 to 01/23/2023 1604       Date/Time Order Dose Route Action Action by     01/23/2023 0434 EST sodium chloride 0 9 % infusion 125 mL/hr Intravenous New Bag Anita Fahr     01/22/2023 2019 EST sodium chloride 0 9 % infusion 125 mL/hr Intravenous New Bag Anita Fahr     01/23/2023 0925 EST enoxaparin (LOVENOX) subcutaneous injection 40 mg 40 mg Subcutaneous Given Chaitanya Mays RN     01/23/2023 1330 EST HYDROmorphone (DILAUDID) injection 1 mg 1 mg Intravenous Given Chaitanya Mays RN     01/23/2023 0925 EST HYDROmorphone (DILAUDID) injection 1 mg 1 mg Intravenous Given Chaitanya Mays RN     01/23/2023 0442 EST HYDROmorphone (DILAUDID) injection 1 mg 1 mg Intravenous Given Anita delano     01/23/2023 0042 EST HYDROmorphone (DILAUDID) injection 1 mg 1 mg Intravenous Given Anita Fahr     01/22/2023 2018 EST HYDROmorphone (DILAUDID) injection 1 mg 1 mg Intravenous Given Anita Fahr     01/22/2023 1617 EST HYDROmorphone (DILAUDID) injection 1 mg 1 mg Intravenous Given Enid Rosata     01/23/2023 0925 EST pantoprazole (PROTONIX) injection 40 mg 40 mg Intravenous Given Chaitanya Mays RN     01/23/2023 0925 EST DULoxetine (CYMBALTA) delayed release capsule 60 mg 60 mg Oral Given Chaitanya Mays RN     01/23/2023 0925 EST cholestyramine sugar free (QUESTRAN LIGHT) packet 4 g 4 g Oral Given Chaitanya Mays RN     01/22/2023 1735 EST cholestyramine sugar free (QUESTRAN LIGHT) packet 4 g 4 g Oral Given Amy Santiago RN          Objective:     Vitals: Blood pressure 154/86, pulse 86, temperature 97 9 °F (36 6 °C), resp   rate 16, height 5' 9" (1 753 m), weight 81 6 kg (180 lb), SpO2 94 % ,Body mass index is 26 58 kg/m²        Intake/Output Summary (Last 24 hours) at 1/23/2023 1604  Last data filed at 1/23/2023 2260  Gross per 24 hour   Intake 1500 ml   Output 1150 ml   Net 350 ml       Physical Exam:   General Appearance: Awake and alert, in no acute distress  Chest: clear to auscultation, no rales or rhonchi  Cardiac: S1 & S2 normal, no murmur or gallop  Abdomen: Soft, non-tender, non-distended; bowel sounds normal; no masses or no organomegaly    Invasive Devices     Peripheral Intravenous Line  Duration           Peripheral IV 01/18/23 Right Antecubital 5 days          Drain  Duration           Ileostomy Continent (Abdominal pouch) RLQ 63 days                Lab Results:  Admission on 01/18/2023   Component Date Value   • WBC 01/18/2023 13 03 (H)    • RBC 01/18/2023 6 90 (H)    • Hemoglobin 01/18/2023 13 0    • Hematocrit 01/18/2023 42 9    • MCV 01/18/2023 62 (L)    • MCH 01/18/2023 18 8 (L)    • MCHC 01/18/2023 30 3 (L)    • RDW 01/18/2023 18 2 (H)    • MPV 01/18/2023 8 6 (L)    • Platelets 15/97/1811 399 (H)    • nRBC 01/18/2023 0    • Neutrophils Relative 01/18/2023 86 (H)    • Immat GRANS % 01/18/2023 1    • Lymphocytes Relative 01/18/2023 5 (L)    • Monocytes Relative 01/18/2023 6    • Eosinophils Relative 01/18/2023 1    • Basophils Relative 01/18/2023 1    • Neutrophils Absolute 01/18/2023 11 38 (H)    • Immature Grans Absolute 01/18/2023 0 07    • Lymphocytes Absolute 01/18/2023 0 63    • Monocytes Absolute 01/18/2023 0 78    • Eosinophils Absolute 01/18/2023 0 08    • Basophils Absolute 01/18/2023 0 09    • Sodium 01/18/2023 131 (L)    • Potassium 01/18/2023 3 5    • Chloride 01/18/2023 95 (L)    • CO2 01/18/2023 25    • ANION GAP 01/18/2023 11    • BUN 01/18/2023 11    • Creatinine 01/18/2023 1 26    • Glucose 01/18/2023 100    • Calcium 01/18/2023 9 6    • AST 01/18/2023 23    • ALT 01/18/2023 60    • Alkaline Phosphatase 01/18/2023 133 (H)    • Total Protein 01/18/2023 9 1 (H)    • Albumin 01/18/2023 4 6    • Total Bilirubin 01/18/2023 1 10 (H)    • eGFR 01/18/2023 76    • Lipase 01/18/2023 74    • Magnesium 01/18/2023 1 6    • Color, UA 01/18/2023 Yellow    • Clarity, UA 01/18/2023 Clear    • Specific Gravity, UA 01/18/2023 1 025    • pH, UA 01/18/2023 6 0    • Leukocytes, UA 01/18/2023 Negative    • Nitrite, UA 01/18/2023 Negative    • Protein, UA 01/18/2023 Negative    • Glucose, UA 01/18/2023 Negative    • Ketones, UA 01/18/2023 Negative    • Urobilinogen, UA 01/18/2023 0 2    • Bilirubin, UA 01/18/2023 Negative    • Occult Blood, UA 01/18/2023 Trace-Intact (A)    • LACTIC ACID 01/18/2023 1 2    • Blood Culture 01/18/2023 No Growth After 5 Days  • Blood Culture 01/18/2023 No Growth After 5 Days      • RBC, UA 01/18/2023 0-1    • WBC, UA 01/18/2023 None Seen    • Epithelial Cells 01/18/2023 None Seen    • Bacteria, UA 01/18/2023 Occasional    • TSH 3RD GENERATON 01/18/2023 1 514    • Rotavirus 01/18/2023 Negative    • Fecal Occult Blood Diagn* 01/18/2023 Positive (A)    • Fecal Occult Blood Diagn* 01/18/2023 Test not performed    • Fecal Occult Blood Diagn* 01/18/2023 Test not performed    • CRP 01/18/2023 43 5 (H)    • Salmonella sp PCR 01/18/2023 None Detected    • Shigella sp/Enteroinvasi* 01/18/2023 None Detected    • Campylobacter sp (jejuni* 01/18/2023 None Detected    • Shiga toxin 1/Shiga toxi* 01/18/2023 None Detected    • C difficile toxin by PCR 01/18/2023 Negative    • SARS-CoV-2 01/18/2023 Negative    • INFLUENZA A PCR 01/18/2023 Negative    • INFLUENZA B PCR 01/18/2023 Negative    • RSV PCR 01/18/2023 Negative    • Sodium 01/19/2023 137    • Potassium 01/19/2023 3 4 (L)    • Chloride 01/19/2023 103    • CO2 01/19/2023 22    • ANION GAP 01/19/2023 12    • BUN 01/19/2023 8    • Creatinine 01/19/2023 1 08    • Glucose 01/19/2023 75    • Calcium 01/19/2023 8 5    • Corrected Calcium 01/19/2023 9 2    • AST 01/19/2023 19    • ALT 01/19/2023 32    • Alkaline Phosphatase 01/19/2023 89    • Total Protein 01/19/2023 6 2 (L)    • Albumin 01/19/2023 3 1 (L)    • Total Bilirubin 01/19/2023 0 74    • eGFR 01/19/2023 92    • WBC 01/19/2023 6 38    • RBC 01/19/2023 5 12    • Hemoglobin 01/19/2023 9 7 (L)    • Hematocrit 01/19/2023 32 2 (L)    • MCV 01/19/2023 63 (L)    • MCH 01/19/2023 18 9 (L)    • MCHC 01/19/2023 30 1 (L)    • RDW 01/19/2023 16 1 (H)    • MPV 01/19/2023 8 5 (L)    • Platelets 44/87/3903 258    • nRBC 01/19/2023 0    • Neutrophils Relative 01/19/2023 65    • Immat GRANS % 01/19/2023 0    • Lymphocytes Relative 01/19/2023 17    • Monocytes Relative 01/19/2023 13 (H)    • Eosinophils Relative 01/19/2023 4    • Basophils Relative 01/19/2023 1    • Neutrophils Absolute 01/19/2023 4 15    • Immature Grans Absolute 01/19/2023 0 02    • Lymphocytes Absolute 01/19/2023 1 06    • Monocytes Absolute 01/19/2023 0 83    • Eosinophils Absolute 01/19/2023 0 23    • Basophils Absolute 01/19/2023 0 09    • Sodium 01/20/2023 136    • Potassium 01/20/2023 3 7    • Chloride 01/20/2023 104    • CO2 01/20/2023 24    • ANION GAP 01/20/2023 8    • BUN 01/20/2023 6    • Creatinine 01/20/2023 0 91    • Glucose 01/20/2023 88    • Calcium 01/20/2023 8 4    • Corrected Calcium 01/20/2023 9 3    • AST 01/20/2023 20    • ALT 01/20/2023 26    • Alkaline Phosphatase 01/20/2023 84    • Total Protein 01/20/2023 6 0 (L)    • Albumin 01/20/2023 2 9 (L)    • Total Bilirubin 01/20/2023 0 48    • eGFR 01/20/2023 113    • WBC 01/20/2023 6 73    • RBC 01/20/2023 4 86    • Hemoglobin 01/20/2023 9 2 (L)    • Hematocrit 01/20/2023 30 5 (L)    • MCV 01/20/2023 63 (L)    • MCH 01/20/2023 18 9 (L)    • MCHC 01/20/2023 30 2 (L)    • RDW 01/20/2023 16 0 (H)    • MPV 01/20/2023 8 5 (L)    • Platelets 02/74/2376 248    • nRBC 01/20/2023 0    • Neutrophils Relative 01/20/2023 63    • Immat GRANS % 01/20/2023 0    • Lymphocytes Relative 01/20/2023 16    • Monocytes Relative 01/20/2023 14 (H)    • Eosinophils Relative 01/20/2023 6    • Basophils Relative 01/20/2023 1    • Neutrophils Absolute 01/20/2023 4 23    • Immature Grans Absolute 01/20/2023 0 01    • Lymphocytes Absolute 01/20/2023 1 06    • Monocytes Absolute 01/20/2023 0 95    • Eosinophils Absolute 01/20/2023 0 42    • Basophils Absolute 01/20/2023 0 06    • Sodium 01/21/2023 134 (L)    • Potassium 01/21/2023 3 3 (L)    • Chloride 01/21/2023 102    • CO2 01/21/2023 26    • ANION GAP 01/21/2023 6    • BUN 01/21/2023 3 (L)    • Creatinine 01/21/2023 0 90    • Glucose 01/21/2023 92    • Calcium 01/21/2023 8 5    • Corrected Calcium 01/21/2023 9 4    • AST 01/21/2023 17    • ALT 01/21/2023 28    • Alkaline Phosphatase 01/21/2023 88    • Total Protein 01/21/2023 5 9 (L)    • Albumin 01/21/2023 2 9 (L)    • Total Bilirubin 01/21/2023 0 39    • eGFR 01/21/2023 115    • WBC 01/21/2023 6 02    • RBC 01/21/2023 4 78    • Hemoglobin 01/21/2023 9 0 (L)    • Hematocrit 01/21/2023 30 1 (L)    • MCV 01/21/2023 63 (L)    • MCH 01/21/2023 18 8 (L)    • MCHC 01/21/2023 29 9 (L)    • RDW 01/21/2023 15 9 (H)    • MPV 01/21/2023 8 5 (L)    • Platelets 10/02/1149 244    • nRBC 01/21/2023 0    • Neutrophils Relative 01/21/2023 64    • Immat GRANS % 01/21/2023 0    • Lymphocytes Relative 01/21/2023 16    • Monocytes Relative 01/21/2023 14 (H)    • Eosinophils Relative 01/21/2023 5    • Basophils Relative 01/21/2023 1    • Neutrophils Absolute 01/21/2023 3 79    • Immature Grans Absolute 01/21/2023 0 02    • Lymphocytes Absolute 01/21/2023 0 99    • Monocytes Absolute 01/21/2023 0 86    • Eosinophils Absolute 01/21/2023 0 31    • Basophils Absolute 01/21/2023 0 05    • Iron Saturation 01/21/2023 16 (L)    • TIBC 01/21/2023 265    • Iron 01/21/2023 43 (L)    • Ferritin 01/21/2023 151        Imaging Studies: I have personally reviewed pertinent imaging studies

## 2023-01-24 VITALS
DIASTOLIC BLOOD PRESSURE: 74 MMHG | WEIGHT: 180 LBS | OXYGEN SATURATION: 98 % | SYSTOLIC BLOOD PRESSURE: 122 MMHG | HEART RATE: 67 BPM | HEIGHT: 69 IN | TEMPERATURE: 97.6 F | RESPIRATION RATE: 18 BRPM | BODY MASS INDEX: 26.66 KG/M2

## 2023-01-24 LAB
ANION GAP SERPL CALCULATED.3IONS-SCNC: 7 MMOL/L (ref 4–13)
BUN SERPL-MCNC: 3 MG/DL (ref 5–25)
CALCIUM SERPL-MCNC: 8.9 MG/DL (ref 8.3–10.1)
CHLORIDE SERPL-SCNC: 103 MMOL/L (ref 96–108)
CO2 SERPL-SCNC: 30 MMOL/L (ref 21–32)
CREAT SERPL-MCNC: 0.95 MG/DL (ref 0.6–1.3)
GFR SERPL CREATININE-BSD FRML MDRD: 107 ML/MIN/1.73SQ M
GLUCOSE SERPL-MCNC: 121 MG/DL (ref 65–140)
HCT VFR BLD AUTO: 34.9 % (ref 36.5–49.3)
HGB BLD-MCNC: 10.5 G/DL (ref 12–17)
MAGNESIUM SERPL-MCNC: 1.6 MG/DL (ref 1.6–2.6)
POTASSIUM SERPL-SCNC: 3.2 MMOL/L (ref 3.5–5.3)
SODIUM SERPL-SCNC: 140 MMOL/L (ref 135–147)

## 2023-01-24 RX ORDER — NALOXONE HYDROCHLORIDE 4 MG/.1ML
SPRAY NASAL
Qty: 1 EACH | Refills: 0 | Status: ON HOLD | OUTPATIENT
Start: 2023-01-24

## 2023-01-24 RX ORDER — ACETAMINOPHEN 325 MG/1
650 TABLET ORAL EVERY 6 HOURS PRN
Refills: 0 | Status: ON HOLD
Start: 2023-01-24

## 2023-01-24 RX ORDER — PANTOPRAZOLE SODIUM 40 MG/1
40 TABLET, DELAYED RELEASE ORAL DAILY
Qty: 30 TABLET | Refills: 0 | Status: ON HOLD | OUTPATIENT
Start: 2023-01-24 | End: 2023-02-23

## 2023-01-24 RX ORDER — POTASSIUM CHLORIDE 20 MEQ/1
40 TABLET, EXTENDED RELEASE ORAL ONCE
Status: COMPLETED | OUTPATIENT
Start: 2023-01-24 | End: 2023-01-24

## 2023-01-24 RX ORDER — FERROUS SULFATE TAB EC 324 MG (65 MG FE EQUIVALENT) 324 (65 FE) MG
324 TABLET DELAYED RESPONSE ORAL
Qty: 30 TABLET | Refills: 0 | Status: ON HOLD | OUTPATIENT
Start: 2023-01-24

## 2023-01-24 RX ORDER — HYDROMORPHONE HYDROCHLORIDE 2 MG/1
2 TABLET ORAL EVERY 8 HOURS PRN
Qty: 12 TABLET | Refills: 0 | Status: SHIPPED | OUTPATIENT
Start: 2023-01-24 | End: 2023-02-07

## 2023-01-24 RX ORDER — CHOLESTYRAMINE LIGHT 4 G/5.7G
8 POWDER, FOR SUSPENSION ORAL 2 TIMES DAILY
Qty: 120 PACKET | Refills: 0 | Status: ON HOLD | OUTPATIENT
Start: 2023-01-24 | End: 2023-01-28 | Stop reason: SDUPTHER

## 2023-01-24 RX ORDER — MAGNESIUM SULFATE HEPTAHYDRATE 40 MG/ML
2 INJECTION, SOLUTION INTRAVENOUS ONCE
Status: DISCONTINUED | OUTPATIENT
Start: 2023-01-24 | End: 2023-01-24

## 2023-01-24 RX ADMIN — HYDROMORPHONE HYDROCHLORIDE 1 MG: 1 INJECTION, SOLUTION INTRAMUSCULAR; INTRAVENOUS; SUBCUTANEOUS at 08:39

## 2023-01-24 RX ADMIN — SODIUM CHLORIDE 125 ML/HR: 0.9 INJECTION, SOLUTION INTRAVENOUS at 01:26

## 2023-01-24 RX ADMIN — DULOXETINE HYDROCHLORIDE 60 MG: 60 CAPSULE, DELAYED RELEASE ORAL at 08:39

## 2023-01-24 RX ADMIN — ENOXAPARIN SODIUM 40 MG: 40 INJECTION SUBCUTANEOUS at 08:39

## 2023-01-24 RX ADMIN — PANTOPRAZOLE SODIUM 40 MG: 40 INJECTION, POWDER, FOR SOLUTION INTRAVENOUS at 08:38

## 2023-01-24 RX ADMIN — SODIUM CHLORIDE 125 ML/HR: 0.9 INJECTION, SOLUTION INTRAVENOUS at 13:18

## 2023-01-24 RX ADMIN — POTASSIUM CHLORIDE 40 MEQ: 1500 TABLET, EXTENDED RELEASE ORAL at 15:11

## 2023-01-24 RX ADMIN — MAGNESIUM OXIDE TAB 400 MG (241.3 MG ELEMENTAL MG) 400 MG: 400 (241.3 MG) TAB at 15:11

## 2023-01-24 RX ADMIN — CHOLESTYRAMINE 8 G: 4 POWDER, FOR SUSPENSION ORAL at 08:39

## 2023-01-24 RX ADMIN — HYDROMORPHONE HYDROCHLORIDE 1 MG: 1 INJECTION, SOLUTION INTRAMUSCULAR; INTRAVENOUS; SUBCUTANEOUS at 03:42

## 2023-01-24 RX ADMIN — HYDROMORPHONE HYDROCHLORIDE 1 MG: 1 INJECTION, SOLUTION INTRAMUSCULAR; INTRAVENOUS; SUBCUTANEOUS at 13:10

## 2023-01-24 NOTE — PROGRESS NOTES
Progress Note- Sena Jaramillo 32 y o  male MRN: [id number]    Unit/Bed#: [address] [place] [address] Encounter: [id number]      Assessment and Plan:    Abdominal pain  Patient presents abdominal pain and increased ileostomy output- complex PMH including IBD status post colectomy with J-pouch formation, proctitis, recent surgery 10/18 for diverting ileostomy complicated by prolapse and localized intra-abdominal infection   Plan for reconstruction of pouch in April at [facility], scheduled for April 20  EGD 1/20 due to regular NSAID use/abdominal pain showed erosive gastritis and duodenal erosions  Biopsies pending      -CT a/p on admission-No acute findings in the abdomen or pelvis  Right lower quadrant ileostomy   Reidentified fluid-filled J-pouch with persistent 3mild mucosal hyperemia which has been an ongoing finding  -CRP was last 6 7 in Nov 2022, now with elevation of 43 5, also noted to have leukocytosis on admission which now has resolved  -Fecal calprotectin on last admission was less than 16  -Patient with acute onset and some leukocytosis with subjective fever and chills, will rule out infectious etiology, O+P still in process  Neg for covid/flu/rsv, C diff, Enteric panel, Rotavirus antigen  -Strict I and O's to measure ileostomy output  -Continue Questran to 8g BID  Discussed side effects of constipation and to take 2-4 hours apart from other medications  -Await EGD biopsy results  -Continue Pantoprazole 40mg daily  -Avoid NSAIDs-will need to follow up w/ rheumatology regarding treatment of ankylosing spondylitis  -Continue follow up with [facility] colorectal  -Follow up with IBD specialist on discharge      ______________________________________________________________________    Subjective:     Pt reports less ileostomy output yesterday and it's starting to thicken up  He had 800mL yesterday and then 800mL overnight  Abdominal pain is improving  He's tolerating diet without nausea/vomiting  Labs still TBC   Pt reports he feels ready for discharge      Medication Administration - last 24 hours from 01/23/2023 0914 to 01/24/2023 0914       Date/Time Order Dose Route Action Action by     01/24/2023 0126 EST sodium chloride 0 9 % infusion 125 mL/hr Intravenous New Bag Matthew Dolan, ZACHARY     01/23/2023 1658 EST sodium chloride 0 9 % infusion 125 mL/hr Intravenous Kevinet 37 Yanna Griggs, ZACHARY     01/24/2023 1423 EST enoxaparin (LOVENOX) subcutaneous injection 40 mg 40 mg Subcutaneous Given Yanna Griggs, ZACHARY     01/23/2023 4371 EST enoxaparin (LOVENOX) subcutaneous injection 40 mg 40 mg Subcutaneous Given Yanna Griggs RN     01/24/2023 1665 EST HYDROmorphone (DILAUDID) injection 1 mg 1 mg Intravenous Given Yanna Griggs RN     01/24/2023 0342 EST HYDROmorphone (DILAUDID) injection 1 mg 1 mg Intravenous Given Konrad Verma, ZACHARY     01/23/2023 2207 EST HYDROmorphone (DILAUDID) injection 1 mg 1 mg Intravenous Given Roby Arnold, ZACHARY     01/23/2023 1757 EST HYDROmorphone (DILAUDID) injection 1 mg 1 mg Intravenous Given Yanna Griggs, ZACHARY     01/23/2023 1330 EST HYDROmorphone (DILAUDID) injection 1 mg 1 mg Intravenous Given Yanna Griggs RN     01/23/2023 0925 EST HYDROmorphone (DILAUDID) injection 1 mg 1 mg Intravenous Given Yanna Griggs RN     01/24/2023 1494 EST pantoprazole (PROTONIX) injection 40 mg 40 mg Intravenous Given Yanna Griggs RN     01/23/2023 0925 EST pantoprazole (PROTONIX) injection 40 mg 40 mg Intravenous Given Yanna Griggs RN     01/24/2023 0839 EST DULoxetine (CYMBALTA) delayed release capsule 60 mg 60 mg Oral Given Yanna Griggs RN     01/23/2023 0925 EST DULoxetine (CYMBALTA) delayed release capsule 60 mg 60 mg Oral Given Yanna Griggs RN     01/23/2023 0925 EST cholestyramine sugar free (QUESTRAN LIGHT) packet 4 g 4 g Oral Given Yanna Griggs RN     01/24/2023 0839 EST cholestyramine sugar free (QUESTRAN LIGHT) packet 8 g 8 g Oral Given Meg Gary Gaspar RN     01/23/2023 1743 EST cholestyramine sugar free (QUESTRAN LIGHT) packet 8 g 8 g Oral Given Ashley An RN          Objective:     Vitals: Blood pressure 139/91, pulse 67, temperature 97 6 °F (36 4 °C), resp  rate 16, height 5' 9" (1 753 m), weight 81 6 kg (180 lb), SpO2 98 %  ,Body mass index is 26 58 kg/m²        Intake/Output Summary (Last 24 hours) at 1/24/2023 0914  Last data filed at 1/24/2023 0126  Gross per 24 hour   Intake 2375 ml   Output 800 ml   Net 1575 ml       Physical Exam:   General Appearance: Awake and alert, in no acute distress  Chest: clear to auscultation, no rales or rhonchi  Cardiac: S1 & S2 normal, no murmur or gallop  Abdomen: Soft, non-tender, non-distended; bowel sounds normal; no masses or no organomegaly    Invasive Devices     Peripheral Intravenous Line  Duration           Peripheral IV 01/18/23 Right Antecubital 6 days          Drain  Duration           Ileostomy Continent (Abdominal pouch) RLQ 64 days                Lab Results:  Admission on 01/18/2023   Component Date Value   • WBC 01/18/2023 13 03 (H)    • RBC 01/18/2023 6 90 (H)    • Hemoglobin 01/18/2023 13 0    • Hematocrit 01/18/2023 42 9    • MCV 01/18/2023 62 (L)    • MCH 01/18/2023 18 8 (L)    • MCHC 01/18/2023 30 3 (L)    • RDW 01/18/2023 18 2 (H)    • MPV 01/18/2023 8 6 (L)    • Platelets 79/34/5330 399 (H)    • nRBC 01/18/2023 0    • Neutrophils Relative 01/18/2023 86 (H)    • Immat GRANS % 01/18/2023 1    • Lymphocytes Relative 01/18/2023 5 (L)    • Monocytes Relative 01/18/2023 6    • Eosinophils Relative 01/18/2023 1    • Basophils Relative 01/18/2023 1    • Neutrophils Absolute 01/18/2023 11 38 (H)    • Immature Grans Absolute 01/18/2023 0 07    • Lymphocytes Absolute 01/18/2023 0 63    • Monocytes Absolute 01/18/2023 0 78    • Eosinophils Absolute 01/18/2023 0 08    • Basophils Absolute 01/18/2023 0 09    • Sodium 01/18/2023 131 (L)    • Potassium 01/18/2023 3 5    • Chloride 01/18/2023 95 (L)    • CO2 01/18/2023 25    • ANION GAP 01/18/2023 11    • BUN 01/18/2023 11    • Creatinine 01/18/2023 1 26    • Glucose 01/18/2023 100    • Calcium 01/18/2023 9 6    • AST 01/18/2023 23    • ALT 01/18/2023 60    • Alkaline Phosphatase 01/18/2023 133 (H)    • Total Protein 01/18/2023 9 1 (H)    • Albumin 01/18/2023 4 6    • Total Bilirubin 01/18/2023 1 10 (H)    • eGFR 01/18/2023 76    • Lipase 01/18/2023 74    • Magnesium 01/18/2023 1 6    • Color, UA 01/18/2023 Yellow    • Clarity, UA 01/18/2023 Clear    • Specific Gravity, UA 01/18/2023 1 025    • pH, UA 01/18/2023 6 0    • Leukocytes, UA 01/18/2023 Negative    • Nitrite, UA 01/18/2023 Negative    • Protein, UA 01/18/2023 Negative    • Glucose, UA 01/18/2023 Negative    • Ketones, UA 01/18/2023 Negative    • Urobilinogen, UA 01/18/2023 0 2    • Bilirubin, UA 01/18/2023 Negative    • Occult Blood, UA 01/18/2023 Trace-Intact (A)    • LACTIC ACID 01/18/2023 1 2    • Blood Culture 01/18/2023 No Growth After 5 Days  • Blood Culture 01/18/2023 No Growth After 5 Days      • RBC, UA 01/18/2023 0-1    • WBC, UA 01/18/2023 None Seen    • Epithelial Cells 01/18/2023 None Seen    • Bacteria, UA 01/18/2023 Occasional    • TSH 3RD GENERATON 01/18/2023 1 514    • Rotavirus 01/18/2023 Negative    • Fecal Occult Blood Diagn* 01/18/2023 Positive (A)    • Fecal Occult Blood Diagn* 01/18/2023 Test not performed    • Fecal Occult Blood Diagn* 01/18/2023 Test not performed    • CRP 01/18/2023 43 5 (H)    • Salmonella sp PCR 01/18/2023 None Detected    • Shigella sp/Enteroinvasi* 01/18/2023 None Detected    • Campylobacter sp (jejuni* 01/18/2023 None Detected    • Shiga toxin 1/Shiga toxi* 01/18/2023 None Detected    • C difficile toxin by PCR 01/18/2023 Negative    • SARS-CoV-2 01/18/2023 Negative    • INFLUENZA A PCR 01/18/2023 Negative    • INFLUENZA B PCR 01/18/2023 Negative    • RSV PCR 01/18/2023 Negative    • Sodium 01/19/2023 137    • Potassium 01/19/2023 3 4 (L)    • Chloride 01/19/2023 103    • CO2 01/19/2023 22    • ANION GAP 01/19/2023 12    • BUN 01/19/2023 8    • Creatinine 01/19/2023 1 08    • Glucose 01/19/2023 75    • Calcium 01/19/2023 8 5    • Corrected Calcium 01/19/2023 9 2    • AST 01/19/2023 19    • ALT 01/19/2023 32    • Alkaline Phosphatase 01/19/2023 89    • Total Protein 01/19/2023 6 2 (L)    • Albumin 01/19/2023 3 1 (L)    • Total Bilirubin 01/19/2023 0 74    • eGFR 01/19/2023 92    • WBC 01/19/2023 6 38    • RBC 01/19/2023 5 12    • Hemoglobin 01/19/2023 9 7 (L)    • Hematocrit 01/19/2023 32 2 (L)    • MCV 01/19/2023 63 (L)    • MCH 01/19/2023 18 9 (L)    • MCHC 01/19/2023 30 1 (L)    • RDW 01/19/2023 16 1 (H)    • MPV 01/19/2023 8 5 (L)    • Platelets 25/39/1271 258    • nRBC 01/19/2023 0    • Neutrophils Relative 01/19/2023 65    • Immat GRANS % 01/19/2023 0    • Lymphocytes Relative 01/19/2023 17    • Monocytes Relative 01/19/2023 13 (H)    • Eosinophils Relative 01/19/2023 4    • Basophils Relative 01/19/2023 1    • Neutrophils Absolute 01/19/2023 4 15    • Immature Grans Absolute 01/19/2023 0 02    • Lymphocytes Absolute 01/19/2023 1 06    • Monocytes Absolute 01/19/2023 0 83    • Eosinophils Absolute 01/19/2023 0 23    • Basophils Absolute 01/19/2023 0 09    • Sodium 01/20/2023 136    • Potassium 01/20/2023 3 7    • Chloride 01/20/2023 104    • CO2 01/20/2023 24    • ANION GAP 01/20/2023 8    • BUN 01/20/2023 6    • Creatinine 01/20/2023 0 91    • Glucose 01/20/2023 88    • Calcium 01/20/2023 8 4    • Corrected Calcium 01/20/2023 9 3    • AST 01/20/2023 20    • ALT 01/20/2023 26    • Alkaline Phosphatase 01/20/2023 84    • Total Protein 01/20/2023 6 0 (L)    • Albumin 01/20/2023 2 9 (L)    • Total Bilirubin 01/20/2023 0 48    • eGFR 01/20/2023 113    • WBC 01/20/2023 6 73    • RBC 01/20/2023 4 86    • Hemoglobin 01/20/2023 9 2 (L)    • Hematocrit 01/20/2023 30 5 (L)    • MCV 01/20/2023 63 (L)    • MCH 01/20/2023 18 9 (L)    • MCHC 01/20/2023 30 2 (L) • RDW 01/20/2023 16 0 (H)    • MPV 01/20/2023 8 5 (L)    • Platelets 46/60/8436 248    • nRBC 01/20/2023 0    • Neutrophils Relative 01/20/2023 63    • Immat GRANS % 01/20/2023 0    • Lymphocytes Relative 01/20/2023 16    • Monocytes Relative 01/20/2023 14 (H)    • Eosinophils Relative 01/20/2023 6    • Basophils Relative 01/20/2023 1    • Neutrophils Absolute 01/20/2023 4 23    • Immature Grans Absolute 01/20/2023 0 01    • Lymphocytes Absolute 01/20/2023 1 06    • Monocytes Absolute 01/20/2023 0 95    • Eosinophils Absolute 01/20/2023 0 42    • Basophils Absolute 01/20/2023 0 06    • Sodium 01/21/2023 134 (L)    • Potassium 01/21/2023 3 3 (L)    • Chloride 01/21/2023 102    • CO2 01/21/2023 26    • ANION GAP 01/21/2023 6    • BUN 01/21/2023 3 (L)    • Creatinine 01/21/2023 0 90    • Glucose 01/21/2023 92    • Calcium 01/21/2023 8 5    • Corrected Calcium 01/21/2023 9 4    • AST 01/21/2023 17    • ALT 01/21/2023 28    • Alkaline Phosphatase 01/21/2023 88    • Total Protein 01/21/2023 5 9 (L)    • Albumin 01/21/2023 2 9 (L)    • Total Bilirubin 01/21/2023 0 39    • eGFR 01/21/2023 115    • WBC 01/21/2023 6 02    • RBC 01/21/2023 4 78    • Hemoglobin 01/21/2023 9 0 (L)    • Hematocrit 01/21/2023 30 1 (L)    • MCV 01/21/2023 63 (L)    • MCH 01/21/2023 18 8 (L)    • MCHC 01/21/2023 29 9 (L)    • RDW 01/21/2023 15 9 (H)    • MPV 01/21/2023 8 5 (L)    • Platelets 81/55/8799 244    • nRBC 01/21/2023 0    • Neutrophils Relative 01/21/2023 64    • Immat GRANS % 01/21/2023 0    • Lymphocytes Relative 01/21/2023 16    • Monocytes Relative 01/21/2023 14 (H)    • Eosinophils Relative 01/21/2023 5    • Basophils Relative 01/21/2023 1    • Neutrophils Absolute 01/21/2023 3 79    • Immature Grans Absolute 01/21/2023 0 02    • Lymphocytes Absolute 01/21/2023 0 99    • Monocytes Absolute 01/21/2023 0 86    • Eosinophils Absolute 01/21/2023 0 31    • Basophils Absolute 01/21/2023 0 05    • Iron Saturation 01/21/2023 16 (L)    • TIBC 01/21/2023 265    • Iron 01/21/2023 43 (L)    • Ferritin 01/21/2023 151        Imaging Studies: I have personally reviewed pertinent imaging studies

## 2023-01-24 NOTE — PLAN OF CARE
Problem: PAIN - ADULT  Goal: Verbalizes/displays adequate comfort level or baseline comfort level  Description: Interventions:  - Encourage patient to monitor pain and request assistance  - Assess pain using appropriate pain scale  - Administer analgesics based on type and severity of pain and evaluate response  - Implement non-pharmacological measures as appropriate and evaluate response  - Consider cultural and social influences on pain and pain management  - Notify physician/advanced practitioner if interventions unsuccessful or patient reports new pain  Outcome: Progressing     Problem: INFECTION - ADULT  Goal: Absence or prevention of progression during hospitalization  Description: INTERVENTIONS:  - Assess and monitor for signs and symptoms of infection  - Monitor lab/diagnostic results  - Monitor all insertion sites, i e  indwelling lines, tubes, and drains  - Monitor endotracheal if appropriate and nasal secretions for changes in amount and color  - [place] appropriate cooling/warming therapies per order  - Administer medications as ordered  - Instruct and encourage patient and family to use good hand hygiene technique  - Identify and instruct in appropriate isolation precautions for identified infection/condition  Outcome: Progressing     Problem: SAFETY ADULT  Goal: Patient will remain free of falls  Description: INTERVENTIONS:  - Educate patient/family on patient safety including physical limitations  - Instruct patient to call for assistance with activity   - Consult OT/PT to assist with strengthening/mobility   - Keep Call bell within reach  - Keep bed low and locked with side rails adjusted as appropriate  - Keep care items and personal belongings within reach  - Initiate and maintain comfort rounds  - Make Fall Risk Sign visible to staff  - Offer Toileting every 2 Hours, in advance of need  - Apply yellow socks and bracelet for high fall risk patients  - Consider moving patient to room near nurses station  Outcome: Progressing  Goal: Maintain or return to baseline ADL function  Description: INTERVENTIONS:  -  Assess patient's ability to carry out ADLs; assess patient's baseline for ADL function and identify physical deficits which impact ability to perform ADLs (bathing, care of mouth/teeth, toileting, grooming, dressing, etc )  - Assess/evaluate cause of self-care deficits   - Assess range of motion  - Assess patient's mobility; develop plan if impaired  - Assess patient's need for assistive devices and provide as appropriate  - Encourage maximum independence but intervene and supervise when necessary  - Involve family in performance of ADLs  - Assess for home care needs following discharge   - Consider OT consult to assist with ADL evaluation and planning for discharge  - Provide patient education as appropriate  Outcome: Progressing  Goal: Maintains/Returns to pre admission functional level  Description: INTERVENTIONS:  - Perform BMAT or MOVE assessment daily    - Set and communicate daily mobility goal to care team and patient/family/caregiver  - Collaborate with rehabilitation services on mobility goals if consulted  - Perform Range of Motion 3 times a day  - Reposition patient every 3 hours    - Dangle patient 3 times a day  - Stand patient 3 times a day  - Ambulate patient 3 times a day  - Out of bed to chair 3 times a day   - Out of bed for meals 3 times a day  - Out of bed for toileting  - Record patient progress and toleration of activity level   Outcome: Progressing     Problem: DISCHARGE PLANNING  Goal: Discharge to home or other facility with appropriate resources  Description: INTERVENTIONS:  - Identify barriers to discharge w/patient and caregiver  - Arrange for needed discharge resources and transportation as appropriate  - Identify discharge learning needs (meds, wound care, etc )  - Arrange for interpretive services to assist at discharge as needed  - Refer to Case Management Department for coordinating discharge planning if the patient needs post-hospital services based on physician/advanced practitioner order or complex needs related to functional status, cognitive ability, or social support system  Outcome: Progressing     Problem: Knowledge Deficit  Goal: Patient/family/caregiver demonstrates understanding of disease process, treatment plan, medications, and discharge instructions  Description: Complete learning assessment and assess knowledge base    Interventions:  - Provide teaching at level of understanding  - Provide teaching via preferred learning methods  Outcome: Progressing     Problem: SAFETY ADULT  Goal: Patient will remain free of falls  Description: INTERVENTIONS:  - Educate patient/family on patient safety including physical limitations  - Instruct patient to call for assistance with activity   - Consult OT/PT to assist with strengthening/mobility   - Keep Call bell within reach  - Keep bed low and locked with side rails adjusted as appropriate  - Keep care items and personal belongings within reach  - Initiate and maintain comfort rounds  - Make Fall Risk Sign visible to staff  - Offer Toileting every 2 Hours, in advance of need  - Apply yellow socks and bracelet for high fall risk patients  - Consider moving patient to room near nurses station  Outcome: Progressing

## 2023-01-24 NOTE — PLAN OF CARE
Problem: PAIN - ADULT  Goal: Verbalizes/displays adequate comfort level or baseline comfort level  Description: Interventions:  - Encourage patient to monitor pain and request assistance  - Assess pain using appropriate pain scale  - Administer analgesics based on type and severity of pain and evaluate response  - Implement non-pharmacological measures as appropriate and evaluate response  - Consider cultural and social influences on pain and pain management  - Notify physician/advanced practitioner if interventions unsuccessful or patient reports new pain  Outcome: Completed     Problem: INFECTION - ADULT  Goal: Absence or prevention of progression during hospitalization  Description: INTERVENTIONS:  - Assess and monitor for signs and symptoms of infection  - Monitor lab/diagnostic results  - Monitor all insertion sites, i e  indwelling lines, tubes, and drains  - Monitor endotracheal if appropriate and nasal secretions for changes in amount and color  - [place] appropriate cooling/warming therapies per order  - Administer medications as ordered  - Instruct and encourage patient and family to use good hand hygiene technique  - Identify and instruct in appropriate isolation precautions for identified infection/condition  Outcome: Completed     Problem: SAFETY ADULT  Goal: Patient will remain free of falls  Description: INTERVENTIONS:  - Educate patient/family on patient safety including physical limitations  - Instruct patient to call for assistance with activity   - Consult OT/PT to assist with strengthening/mobility   - Keep Call bell within reach  - Keep bed low and locked with side rails adjusted as appropriate  - Keep care items and personal belongings within reach  - Initiate and maintain comfort rounds  - Make Fall Risk Sign visible to staff  - Offer Toileting every 2 Hours, in advance of need  - Apply yellow socks and bracelet for high fall risk patients  - Consider moving patient to room near nurses station  Outcome: Completed  Goal: Maintain or return to baseline ADL function  Description: INTERVENTIONS:  -  Assess patient's ability to carry out ADLs; assess patient's baseline for ADL function and identify physical deficits which impact ability to perform ADLs (bathing, care of mouth/teeth, toileting, grooming, dressing, etc )  - Assess/evaluate cause of self-care deficits   - Assess range of motion  - Assess patient's mobility; develop plan if impaired  - Assess patient's need for assistive devices and provide as appropriate  - Encourage maximum independence but intervene and supervise when necessary  - Involve family in performance of ADLs  - Assess for home care needs following discharge   - Consider OT consult to assist with ADL evaluation and planning for discharge  - Provide patient education as appropriate  Outcome: Completed  Goal: Maintains/Returns to pre admission functional level  Description: INTERVENTIONS:  - Perform BMAT or MOVE assessment daily    - Set and communicate daily mobility goal to care team and patient/family/caregiver  - Collaborate with rehabilitation services on mobility goals if consulted  - Perform Range of Motion 3 times a day  - Reposition patient every 3 hours    - Dangle patient 3 times a day  - Stand patient 3 times a day  - Ambulate patient 3 times a day  - Out of bed to chair 3 times a day   - Out of bed for meals 3 times a day  - Out of bed for toileting  - Record patient progress and toleration of activity level   Outcome: Completed     Problem: DISCHARGE PLANNING  Goal: Discharge to home or other facility with appropriate resources  Description: INTERVENTIONS:  - Identify barriers to discharge w/patient and caregiver  - Arrange for needed discharge resources and transportation as appropriate  - Identify discharge learning needs (meds, wound care, etc )  - Arrange for interpretive services to assist at discharge as needed  - Refer to Case Management Department for coordinating discharge planning if the patient needs post-hospital services based on physician/advanced practitioner order or complex needs related to functional status, cognitive ability, or social support system  Outcome: Completed     Problem: Knowledge Deficit  Goal: Patient/family/caregiver demonstrates understanding of disease process, treatment plan, medications, and discharge instructions  Description: Complete learning assessment and assess knowledge base    Interventions:  - Provide teaching at level of understanding  - Provide teaching via preferred learning methods  Outcome: Completed

## 2023-01-24 NOTE — DISCHARGE SUMMARY
Jasmina [address]  Discharge- [place] 1995, 32 y o  male MRN: [id number]  Unit/Bed#: 2 [place] [address] Encounter: [id number]  Primary Care Provider: Chuck Kurtz DO   Date and time admitted to hospital: 1/18/2023  8:36 AM    * Nausea vomiting and diarrhea  Assessment & Plan  PTA, nausea resolved, diarrhea  · REceived supportive tx while here with improvement in symptoms  · Stool cultures- unremarkable  · Started on questran  Pt to continue high dose for about 1 week and then decrease it to once daily after per GI      Abdominal pain  Assessment & Plan  Recurrent, improved  patient followed by [facility] specialist in [facility]  · CT abdomen: neg for acute pathology  fluid-filled J-pouch with persistent mild mucosal hyperemia noted  · CRP elevated at 43 5  · S/P EGD which showed antral & proximal duodenal erosions/erythema  · Patient to follow up at [facility] after d/c   Currently not interested in local GI/IBD specialist    SIRS (systemic inflammatory response syndrome) (HCC)-resolved as of 1/21/2023  Assessment & Plan  Evident by leukocytosis, tachycardia    · CT abdomen negative for acute findings  · CXR neg for pneumonia  · Blood cultures-no growth  · stool cultures-negative for bacteria, positive for occult blood  · UA--WNL  · Lactic acid-WNL  · REceived a dose of Levaquin and metronidazole    Electrolyte abnormality  Assessment & Plan  (Hyponatremia in the setting of nausea and vomiting as evidenced by Na 131, treated with NS bolus x3 and repeat BMP's)  REpleted potassium as well      Chronic pain syndrome  Assessment & Plan  Recommended outpatient pain management referral for management of chronic abd pain but patient declined  · On Dilaudid here  · patient refused Toradol        Ileostomy present 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,    · Follow up at [facility] after d/c    CAR (generalized anxiety disorder)  Assessment & Plan  Continue duloxetine          Medical Problems     Resolved Problems  Date Reviewed: 1/24/2023          Resolved    SIRS (systemic inflammatory response syndrome) ([facility] [address] ) 1/21/2023     Resolved by  Geno Davila MD        Discharging Physician / Practitioner: Virgie Foote DO  PCP: Braxton Smith DO  Admission Date:   Admission Orders (From admission, onward)     Ordered        01/19/23 1714  Inpatient Admission  Once            01/18/23 1358  Place in Observation  Once                      Discharge Date: 01/24/23    Consultations During Hospital Stay:  · GI    Procedures Performed:   · egd    Incidental Findings:   · none    Test Results Pending at Discharge (will require follow up):   · none     Outpatient Tests Requested:  · none    Complications:  none    Reason for Admission: N/V/diarrhea and abdominal pain    Hospital Course:   Celestino Saunders is a 32 y o  male patient who originally presented to the hospital on 1/18/2023 due to Abdominal pain along with nausea, vomiting and diarrhea  On way to work had vomiting and lightheadedness  Typically changes his ostomy bad every 3-4 days but was having to change it every 30 min  Stated was having light green stool  Pt was admitted and seen by GI while here  Underwent GI work up and diarrhea did improve with Christina  Cleared by GI prior to d/c  D/C plans discussed in details with pt    Please see above list of diagnoses and related plan for additional information  Condition at Discharge: stable    Discharge Day Visit / Exam:   Subjective:  States he feels much better  Diarrhea has improved and feels ready to go home  REquesting some dilaudid for home incase pain worsens    Vitals: Blood Pressure: 139/91 (01/24/23 0807)  Pulse: 67 (01/24/23 0807)  Temperature: 97 6 °F (36 4 °C) (01/24/23 0807)  Temp Source: Oral (01/24/23 0807)  Respirations: 16 (01/24/23 0807)  Height: 5' 9" (175 3 cm) (01/20/23 1253)  Weight - Scale: 81 6 kg (180 lb) (01/20/23 1253)  SpO2: 98 % (01/24/23 0807)  Exam:   Physical Exam  Vitals reviewed  Constitutional:       General: He is not in acute distress  Appearance: He is not ill-appearing  HENT:      Head: Normocephalic and atraumatic  Eyes:      General:         Right eye: No discharge  Left eye: No discharge  Cardiovascular:      Rate and Rhythm: Normal rate and regular rhythm  Pulmonary:      Effort: Pulmonary effort is normal  No respiratory distress  Breath sounds: Normal breath sounds  No wheezing or rales  Abdominal:      General: Bowel sounds are normal  There is no distension  Palpations: Abdomen is soft  Tenderness: There is no guarding or rebound  Comments: Ileostomy in place  Mild generalized tenderness noted   Neurological:      Mental Status: He is alert and oriented to person, place, and time  Discharge instructions/Information to patient and family:   See after visit summary for information provided to patient and family  Provisions for Follow-Up Care:  See after visit summary for information related to follow-up care and any pertinent home health orders  Disposition:   Home    Planned Readmission: no     Discharge Statement:  I spent > 30 minutes discharging the patient  This time was spent on the day of discharge  I had direct contact with the patient on the day of discharge   Greater than 50% of the total time was spent examining patient, answering all patient questions, arranging and discussing plan of care with patient as well as directly providing post-discharge instructions  Additional time then spent on discharge activities  Discharge Medications:  See after visit summary for reconciled discharge medications provided to patient and/or family        **Please Note: This note may have been constructed using a voice recognition system**

## 2023-01-24 NOTE — DISCHARGE INSTR - AVS FIRST PAGE
Follow up with your surgeon/IBD specialist and rheumatologist after discharge    You can decrease dose of Questran to once daily if stool output continues to improve or hold it completely if you start to become constipated  take questran 2-4 hours apart from other medications    Take Questran twice a day for 1 week and then decrease it to once daily

## 2023-01-24 NOTE — PLAN OF CARE
Problem: PAIN - ADULT  Goal: Verbalizes/displays adequate comfort level or baseline comfort level  Description: Interventions:  - Encourage patient to monitor pain and request assistance  - Assess pain using appropriate pain scale  - Administer analgesics based on type and severity of pain and evaluate response  - Implement non-pharmacological measures as appropriate and evaluate response  - Consider cultural and social influences on pain and pain management  - Notify physician/advanced practitioner if interventions unsuccessful or patient reports new pain  Outcome: Progressing     Problem: INFECTION - ADULT  Goal: Absence or prevention of progression during hospitalization  Description: INTERVENTIONS:  - Assess and monitor for signs and symptoms of infection  - Monitor lab/diagnostic results  - Monitor all insertion sites, i e  indwelling lines, tubes, and drains  - Monitor endotracheal if appropriate and nasal secretions for changes in amount and color  - [place] appropriate cooling/warming therapies per order  - Administer medications as ordered  - Instruct and encourage patient and family to use good hand hygiene technique  - Identify and instruct in appropriate isolation precautions for identified infection/condition  Outcome: Progressing     Problem: SAFETY ADULT  Goal: Patient will remain free of falls  Description: INTERVENTIONS:  - Educate patient/family on patient safety including physical limitations  - Instruct patient to call for assistance with activity   - Consult OT/PT to assist with strengthening/mobility   - Keep Call bell within reach  - Keep bed low and locked with side rails adjusted as appropriate  - Keep care items and personal belongings within reach  - Initiate and maintain comfort rounds  - Make Fall Risk Sign visible to staff  - Offer Toileting every 2 Hours, in advance of need  - Apply yellow socks and bracelet for high fall risk patients  - Consider moving patient to room near nurses station  Outcome: Progressing  Goal: Maintain or return to baseline ADL function  Description: INTERVENTIONS:  -  Assess patient's ability to carry out ADLs; assess patient's baseline for ADL function and identify physical deficits which impact ability to perform ADLs (bathing, care of mouth/teeth, toileting, grooming, dressing, etc )  - Assess/evaluate cause of self-care deficits   - Assess range of motion  - Assess patient's mobility; develop plan if impaired  - Assess patient's need for assistive devices and provide as appropriate  - Encourage maximum independence but intervene and supervise when necessary  - Involve family in performance of ADLs  - Assess for home care needs following discharge   - Consider OT consult to assist with ADL evaluation and planning for discharge  - Provide patient education as appropriate  Outcome: Progressing  Goal: Maintains/Returns to pre admission functional level  Description: INTERVENTIONS:  - Perform BMAT or MOVE assessment daily    - Set and communicate daily mobility goal to care team and patient/family/caregiver  - Collaborate with rehabilitation services on mobility goals if consulted  - Perform Range of Motion 3 times a day  - Reposition patient every 3 hours    - Dangle patient 3 times a day  - Stand patient 3 times a day  - Ambulate patient 3 times a day  - Out of bed to chair 3 times a day   - Out of bed for meals 3 times a day  - Out of bed for toileting  - Record patient progress and toleration of activity level   Outcome: Progressing     Problem: DISCHARGE PLANNING  Goal: Discharge to home or other facility with appropriate resources  Description: INTERVENTIONS:  - Identify barriers to discharge w/patient and caregiver  - Arrange for needed discharge resources and transportation as appropriate  - Identify discharge learning needs (meds, wound care, etc )  - Arrange for interpretive services to assist at discharge as needed  - Refer to Case Management Department for coordinating discharge planning if the patient needs post-hospital services based on physician/advanced practitioner order or complex needs related to functional status, cognitive ability, or social support system  Outcome: Progressing     Problem: Knowledge Deficit  Goal: Patient/family/caregiver demonstrates understanding of disease process, treatment plan, medications, and discharge instructions  Description: Complete learning assessment and assess knowledge base    Interventions:  - Provide teaching at level of understanding  - Provide teaching via preferred learning methods  Outcome: Progressing

## 2023-01-25 ENCOUNTER — HOSPITAL ENCOUNTER (INPATIENT)
Facility: HOSPITAL | Age: 28
LOS: 2 days | Discharge: HOME | End: 2023-01-28
Attending: EMERGENCY MEDICINE | Admitting: INTERNAL MEDICINE

## 2023-01-25 ENCOUNTER — APPOINTMENT (EMERGENCY)
Dept: RADIOLOGY | Facility: HOSPITAL | Age: 28
End: 2023-01-25

## 2023-01-25 ENCOUNTER — TRANSITIONAL CARE MANAGEMENT (OUTPATIENT)
Dept: FAMILY MEDICINE CLINIC | Facility: CLINIC | Age: 28
End: 2023-01-25

## 2023-01-25 ENCOUNTER — TELEPHONE (OUTPATIENT)
Dept: FAMILY MEDICINE CLINIC | Facility: CLINIC | Age: 28
End: 2023-01-25

## 2023-01-25 DIAGNOSIS — A41.9 SEPSIS (HCC): ICD-10-CM

## 2023-01-25 DIAGNOSIS — J18.9 PNEUMONIA: Primary | ICD-10-CM

## 2023-01-25 DIAGNOSIS — R19.7 DIARRHEA: ICD-10-CM

## 2023-01-25 DIAGNOSIS — R10.12 LEFT UPPER QUADRANT ABDOMINAL PAIN: ICD-10-CM

## 2023-01-25 LAB
ALBUMIN SERPL BCP-MCNC: 4 G/DL (ref 3.5–5)
ALP SERPL-CCNC: 118 U/L (ref 46–116)
ALT SERPL W P-5'-P-CCNC: 34 U/L (ref 12–78)
ANION GAP SERPL CALCULATED.3IONS-SCNC: 10 MMOL/L (ref 4–13)
APTT PPP: 30 SECONDS (ref 23–37)
AST SERPL W P-5'-P-CCNC: 23 U/L (ref 5–45)
BASO STIPL BLD QL SMEAR: PRESENT
BASOPHILS # BLD MANUAL: 0.36 THOUSAND/UL (ref 0–0.1)
BASOPHILS NFR MAR MANUAL: 2 % (ref 0–1)
BILIRUB SERPL-MCNC: 0.59 MG/DL (ref 0.2–1)
BILIRUB UR QL STRIP: NEGATIVE
BUN SERPL-MCNC: 10 MG/DL (ref 5–25)
CALCIUM SERPL-MCNC: 9.4 MG/DL (ref 8.3–10.1)
CHLORIDE SERPL-SCNC: 101 MMOL/L (ref 96–108)
CLARITY UR: CLEAR
CO2 SERPL-SCNC: 27 MMOL/L (ref 21–32)
COLOR UR: ABNORMAL
CREAT SERPL-MCNC: 1.2 MG/DL (ref 0.6–1.3)
EOSINOPHIL # BLD MANUAL: 0 THOUSAND/UL (ref 0–0.4)
EOSINOPHIL NFR BLD MANUAL: 0 % (ref 0–6)
ERYTHROCYTE [DISTWIDTH] IN BLOOD BY AUTOMATED COUNT: 17.4 % (ref 11.6–15.1)
FLUAV RNA RESP QL NAA+PROBE: NEGATIVE
FLUBV RNA RESP QL NAA+PROBE: NEGATIVE
G LAMBLIA AG STL QL IA: NEGATIVE
GFR SERPL CREATININE-BSD FRML MDRD: 81 ML/MIN/1.73SQ M
GLUCOSE SERPL-MCNC: 120 MG/DL (ref 65–140)
GLUCOSE UR STRIP-MCNC: NEGATIVE MG/DL
HCT VFR BLD AUTO: 37.4 % (ref 36.5–49.3)
HGB BLD-MCNC: 11.5 G/DL (ref 12–17)
HGB UR QL STRIP.AUTO: NEGATIVE
HYPERCHROMIA BLD QL SMEAR: PRESENT
INR PPP: 1.01 (ref 0.84–1.19)
KETONES UR STRIP-MCNC: ABNORMAL MG/DL
LACTATE SERPL-SCNC: 0.9 MMOL/L (ref 0.5–2)
LACTATE SERPL-SCNC: 2.2 MMOL/L (ref 0.5–2)
LEUKOCYTE ESTERASE UR QL STRIP: NEGATIVE
LIPASE SERPL-CCNC: 83 U/L (ref 73–393)
LYMPHOCYTES # BLD AUTO: 0.54 THOUSAND/UL (ref 0.6–4.47)
LYMPHOCYTES # BLD AUTO: 3 % (ref 14–44)
MCH RBC QN AUTO: 19.2 PG (ref 26.8–34.3)
MCHC RBC AUTO-ENTMCNC: 30.7 G/DL (ref 31.4–37.4)
MCV RBC AUTO: 62 FL (ref 82–98)
MICROCYTES BLD QL AUTO: PRESENT
MONOCYTES # BLD AUTO: 0.36 THOUSAND/UL (ref 0–1.22)
MONOCYTES NFR BLD: 2 % (ref 4–12)
NEUTROPHILS # BLD MANUAL: 16.73 THOUSAND/UL (ref 1.85–7.62)
NEUTS BAND NFR BLD MANUAL: 7 % (ref 0–8)
NEUTS SEG NFR BLD AUTO: 86 % (ref 43–75)
NITRITE UR QL STRIP: NEGATIVE
O+P STL CONC: NORMAL
PH UR STRIP.AUTO: 6.5 [PH]
PLATELET # BLD AUTO: 528 THOUSANDS/UL (ref 149–390)
PLATELET BLD QL SMEAR: ABNORMAL
PMV BLD AUTO: 8.1 FL (ref 8.9–12.7)
POTASSIUM SERPL-SCNC: 3.3 MMOL/L (ref 3.5–5.3)
PROT SERPL-MCNC: 8 G/DL (ref 6.4–8.4)
PROT UR STRIP-MCNC: NEGATIVE MG/DL
PROTHROMBIN TIME: 13.4 SECONDS (ref 11.6–14.5)
RBC # BLD AUTO: 6 MILLION/UL (ref 3.88–5.62)
RBC MORPH BLD: PRESENT
RSV RNA RESP QL NAA+PROBE: NEGATIVE
SARS-COV-2 RNA RESP QL NAA+PROBE: NEGATIVE
SODIUM SERPL-SCNC: 138 MMOL/L (ref 135–147)
SP GR UR STRIP.AUTO: <=1.005 (ref 1–1.03)
TARGETS BLD QL SMEAR: PRESENT
UROBILINOGEN UR QL STRIP.AUTO: 0.2 E.U./DL
WBC # BLD AUTO: 17.99 THOUSAND/UL (ref 4.31–10.16)

## 2023-01-25 RX ORDER — ONDANSETRON 2 MG/ML
4 INJECTION INTRAMUSCULAR; INTRAVENOUS ONCE
Status: COMPLETED | OUTPATIENT
Start: 2023-01-25 | End: 2023-01-25

## 2023-01-25 RX ORDER — HYDROMORPHONE HCL/PF 1 MG/ML
1 SYRINGE (ML) INJECTION ONCE
Status: COMPLETED | OUTPATIENT
Start: 2023-01-25 | End: 2023-01-25

## 2023-01-25 RX ORDER — ONDANSETRON 2 MG/ML
4 INJECTION INTRAMUSCULAR; INTRAVENOUS ONCE
Status: DISCONTINUED | OUTPATIENT
Start: 2023-01-25 | End: 2023-01-25

## 2023-01-25 RX ORDER — POTASSIUM CHLORIDE 20 MEQ/1
40 TABLET, EXTENDED RELEASE ORAL ONCE
Status: COMPLETED | OUTPATIENT
Start: 2023-01-25 | End: 2023-01-25

## 2023-01-25 RX ORDER — ONDANSETRON 2 MG/ML
INJECTION INTRAMUSCULAR; INTRAVENOUS
Status: DISPENSED
Start: 2023-01-25 | End: 2023-01-26

## 2023-01-25 RX ORDER — LEVOFLOXACIN 5 MG/ML
500 INJECTION, SOLUTION INTRAVENOUS ONCE
Status: COMPLETED | OUTPATIENT
Start: 2023-01-25 | End: 2023-01-25

## 2023-01-25 RX ADMIN — LEVOFLOXACIN 500 MG: 500 INJECTION, SOLUTION INTRAVENOUS at 20:48

## 2023-01-25 RX ADMIN — ONDANSETRON 4 MG: 2 INJECTION INTRAMUSCULAR; INTRAVENOUS at 19:59

## 2023-01-25 RX ADMIN — POTASSIUM CHLORIDE 40 MEQ: 1500 TABLET, EXTENDED RELEASE ORAL at 21:06

## 2023-01-25 RX ADMIN — SODIUM CHLORIDE 1000 ML: 0.9 INJECTION, SOLUTION INTRAVENOUS at 20:00

## 2023-01-25 RX ADMIN — IOHEXOL 100 ML: 350 INJECTION, SOLUTION INTRAVENOUS at 21:27

## 2023-01-25 RX ADMIN — HYDROMORPHONE HYDROCHLORIDE 1 MG: 1 INJECTION, SOLUTION INTRAMUSCULAR; INTRAVENOUS; SUBCUTANEOUS at 21:54

## 2023-01-25 RX ADMIN — HYDROMORPHONE HYDROCHLORIDE 1 MG: 1 INJECTION, SOLUTION INTRAMUSCULAR; INTRAVENOUS; SUBCUTANEOUS at 20:00

## 2023-01-25 NOTE — UTILIZATION REVIEW
NOTIFICATION OF ADMISSION DISCHARGE   This is a Notification of Discharge from [facility]  Please be advised that this patient has been discharge from our facility  Below you will find the admission and discharge date and time including the patient’s disposition  UTILIZATION REVIEW CONTACT:  Kimberly Hall  Utilization   Network Utilization Review Department  Phone: [phone] x carefully listen to the prompts  All voicemails are confidential   Email: [email or web address]     ADMISSION INFORMATION  PRESENTATION DATE: 1/18/2023  8:36 AM  OBERVATION ADMISSION DATE:   INPATIENT ADMISSION DATE: 1/19/23  5:14 PM   DISCHARGE DATE: 1/24/2023  3:35 PM   DISPOSITION:Home/Self Care    IMPORTANT INFORMATION:  Send all requests for admission clinical reviews, approved or denied determinations and any other requests to dedicated fax number below belonging to the campus where the patient is receiving treatment   List of dedicated fax numbers:  [address] DENIALS (Administrative/Medical Necessity) [phone]   [address] (Maternity/NICU/Pediatrics) [phone]   [facility] [phone]   [place] [address] [phone]   [address] [phone]   [address] [facility] [phone] 90 [facility] [phone]   [address] [place], [place] [address] [phone]   [facility]  [phone]   [address] [facility] [phone]   [address] [facility] [address] [facility] [phone]

## 2023-01-26 ENCOUNTER — APPOINTMENT (INPATIENT)
Dept: RADIOLOGY | Facility: HOSPITAL | Age: 28
End: 2023-01-26

## 2023-01-26 ENCOUNTER — APPOINTMENT (INPATIENT)
Dept: NON INVASIVE DIAGNOSTICS | Facility: HOSPITAL | Age: 28
End: 2023-01-26

## 2023-01-26 PROBLEM — J18.9 PNEUMONIA: Status: ACTIVE | Noted: 2023-01-26

## 2023-01-26 PROBLEM — R50.9 FEVER: Status: ACTIVE | Noted: 2023-01-26

## 2023-01-26 LAB
ALBUMIN SERPL BCP-MCNC: 3.4 G/DL (ref 3.5–5)
ALP SERPL-CCNC: 94 U/L (ref 46–116)
ALT SERPL W P-5'-P-CCNC: 25 U/L (ref 12–78)
ANA SER QL IA: NEGATIVE
ANION GAP SERPL CALCULATED.3IONS-SCNC: 8 MMOL/L (ref 4–13)
APTT PPP: 37 SECONDS (ref 23–37)
AST SERPL W P-5'-P-CCNC: 18 U/L (ref 5–45)
BASOPHILS # BLD AUTO: 0.12 THOUSANDS/ÂΜL (ref 0–0.1)
BASOPHILS NFR BLD AUTO: 0 % (ref 0–1)
BILIRUB SERPL-MCNC: 0.8 MG/DL (ref 0.2–1)
BUN SERPL-MCNC: 8 MG/DL (ref 5–25)
CA-I BLD-SCNC: 1.12 MMOL/L (ref 1.12–1.32)
CALCIUM ALBUM COR SERPL-MCNC: 9.5 MG/DL (ref 8.3–10.1)
CALCIUM SERPL-MCNC: 9 MG/DL (ref 8.3–10.1)
CHLORIDE SERPL-SCNC: 100 MMOL/L (ref 96–108)
CO2 SERPL-SCNC: 28 MMOL/L (ref 21–32)
CREAT SERPL-MCNC: 1.14 MG/DL (ref 0.6–1.3)
EOSINOPHIL # BLD AUTO: 0.05 THOUSAND/ÂΜL (ref 0–0.61)
EOSINOPHIL NFR BLD AUTO: 0 % (ref 0–6)
ERYTHROCYTE [DISTWIDTH] IN BLOOD BY AUTOMATED COUNT: 16.2 % (ref 11.6–15.1)
FERRITIN SERPL-MCNC: 213 NG/ML (ref 8–388)
GFR SERPL CREATININE-BSD FRML MDRD: 86 ML/MIN/1.73SQ M
GLUCOSE SERPL-MCNC: 88 MG/DL (ref 65–140)
HCT VFR BLD AUTO: 32.9 % (ref 36.5–49.3)
HEMOCCULT STL QL: NEGATIVE
HGB BLD-MCNC: 9.9 G/DL (ref 12–17)
IMM GRANULOCYTES # BLD AUTO: >0.5 THOUSAND/UL (ref 0–0.2)
IMM GRANULOCYTES NFR BLD AUTO: 2 % (ref 0–2)
INR PPP: 1.22 (ref 0.84–1.19)
IRON SATN MFR SERPL: 17 % (ref 20–50)
IRON SERPL-MCNC: 55 UG/DL (ref 65–175)
LACTATE SERPL-SCNC: 1.5 MMOL/L (ref 0.5–2)
LIPASE SERPL-CCNC: 36 U/L (ref 73–393)
LYMPHOCYTES # BLD AUTO: 1.83 THOUSANDS/ÂΜL (ref 0.6–4.47)
LYMPHOCYTES NFR BLD AUTO: 7 % (ref 14–44)
MAGNESIUM SERPL-MCNC: 1.4 MG/DL (ref 1.6–2.6)
MCH RBC QN AUTO: 18.9 PG (ref 26.8–34.3)
MCHC RBC AUTO-ENTMCNC: 30.1 G/DL (ref 31.4–37.4)
MCV RBC AUTO: 63 FL (ref 82–98)
MONOCYTES # BLD AUTO: 2.57 THOUSAND/ÂΜL (ref 0.17–1.22)
MONOCYTES NFR BLD AUTO: 9 % (ref 4–12)
NEUTROPHILS # BLD AUTO: 22.91 THOUSANDS/ÂΜL (ref 1.85–7.62)
NEUTS SEG NFR BLD AUTO: 82 % (ref 43–75)
NRBC BLD AUTO-RTO: 0 /100 WBCS
PHOSPHATE SERPL-MCNC: 3.5 MG/DL (ref 2.7–4.5)
PLATELET # BLD AUTO: 455 THOUSANDS/UL (ref 149–390)
PMV BLD AUTO: 8.3 FL (ref 8.9–12.7)
POTASSIUM SERPL-SCNC: 3.8 MMOL/L (ref 3.5–5.3)
PROT SERPL-MCNC: 6.7 G/DL (ref 6.4–8.4)
PROTHROMBIN TIME: 15.5 SECONDS (ref 11.6–14.5)
RBC # BLD AUTO: 5.23 MILLION/UL (ref 3.88–5.62)
SODIUM SERPL-SCNC: 136 MMOL/L (ref 135–147)
TIBC SERPL-MCNC: 324 UG/DL (ref 250–450)
WBC # BLD AUTO: 28.08 THOUSAND/UL (ref 4.31–10.16)

## 2023-01-26 RX ORDER — CHOLESTYRAMINE LIGHT 4 G/5.7G
4 POWDER, FOR SUSPENSION ORAL 2 TIMES DAILY
Status: DISCONTINUED | OUTPATIENT
Start: 2023-01-26 | End: 2023-01-28 | Stop reason: HOSPADM

## 2023-01-26 RX ORDER — PROCHLORPERAZINE MALEATE 5 MG/1
5 TABLET ORAL EVERY 6 HOURS PRN
Status: DISCONTINUED | OUTPATIENT
Start: 2023-01-26 | End: 2023-01-26

## 2023-01-26 RX ORDER — MAGNESIUM HYDROXIDE/ALUMINUM HYDROXICE/SIMETHICONE 120; 1200; 1200 MG/30ML; MG/30ML; MG/30ML
30 SUSPENSION ORAL EVERY 6 HOURS PRN
Status: DISCONTINUED | OUTPATIENT
Start: 2023-01-26 | End: 2023-01-28 | Stop reason: HOSPADM

## 2023-01-26 RX ORDER — BENZONATATE 100 MG/1
200 CAPSULE ORAL 3 TIMES DAILY
Status: DISCONTINUED | OUTPATIENT
Start: 2023-01-26 | End: 2023-01-28 | Stop reason: HOSPADM

## 2023-01-26 RX ORDER — ONDANSETRON 2 MG/ML
4 INJECTION INTRAMUSCULAR; INTRAVENOUS ONCE
Status: COMPLETED | OUTPATIENT
Start: 2023-01-26 | End: 2023-01-26

## 2023-01-26 RX ORDER — ACETAMINOPHEN 325 MG/1
650 TABLET ORAL EVERY 6 HOURS PRN
Status: DISCONTINUED | OUTPATIENT
Start: 2023-01-26 | End: 2023-01-28 | Stop reason: HOSPADM

## 2023-01-26 RX ORDER — HYDROMORPHONE HCL/PF 1 MG/ML
1 SYRINGE (ML) INJECTION EVERY 6 HOURS PRN
Status: DISCONTINUED | OUTPATIENT
Start: 2023-01-26 | End: 2023-01-26

## 2023-01-26 RX ORDER — ONDANSETRON 2 MG/ML
4 INJECTION INTRAMUSCULAR; INTRAVENOUS EVERY 6 HOURS PRN
Status: DISCONTINUED | OUTPATIENT
Start: 2023-01-26 | End: 2023-01-28 | Stop reason: HOSPADM

## 2023-01-26 RX ORDER — ENOXAPARIN SODIUM 100 MG/ML
40 INJECTION SUBCUTANEOUS DAILY
Status: DISCONTINUED | OUTPATIENT
Start: 2023-01-26 | End: 2023-01-28 | Stop reason: HOSPADM

## 2023-01-26 RX ORDER — HYDROMORPHONE HCL/PF 1 MG/ML
0.5 SYRINGE (ML) INJECTION
Status: DISCONTINUED | OUTPATIENT
Start: 2023-01-26 | End: 2023-01-28 | Stop reason: HOSPADM

## 2023-01-26 RX ORDER — HYDROMORPHONE HCL/PF 1 MG/ML
1 SYRINGE (ML) INJECTION ONCE
Status: COMPLETED | OUTPATIENT
Start: 2023-01-26 | End: 2023-01-26

## 2023-01-26 RX ORDER — GUAIFENESIN 600 MG/1
1200 TABLET, EXTENDED RELEASE ORAL EVERY 12 HOURS SCHEDULED
Status: DISCONTINUED | OUTPATIENT
Start: 2023-01-26 | End: 2023-01-28 | Stop reason: HOSPADM

## 2023-01-26 RX ORDER — BENZONATATE 100 MG/1
100 CAPSULE ORAL 3 TIMES DAILY
Status: DISCONTINUED | OUTPATIENT
Start: 2023-01-26 | End: 2023-01-26

## 2023-01-26 RX ORDER — PROCHLORPERAZINE MALEATE 5 MG/1
5 TABLET ORAL EVERY 6 HOURS PRN
Status: DISCONTINUED | OUTPATIENT
Start: 2023-01-26 | End: 2023-01-28 | Stop reason: HOSPADM

## 2023-01-26 RX ORDER — MAGNESIUM SULFATE HEPTAHYDRATE 40 MG/ML
2 INJECTION, SOLUTION INTRAVENOUS ONCE
Status: COMPLETED | OUTPATIENT
Start: 2023-01-26 | End: 2023-01-26

## 2023-01-26 RX ORDER — PANTOPRAZOLE SODIUM 40 MG/1
40 TABLET, DELAYED RELEASE ORAL
Status: DISCONTINUED | OUTPATIENT
Start: 2023-01-27 | End: 2023-01-28 | Stop reason: HOSPADM

## 2023-01-26 RX ADMIN — CHOLESTYRAMINE 4 G: 4 POWDER, FOR SUSPENSION ORAL at 17:59

## 2023-01-26 RX ADMIN — ACETAMINOPHEN 650 MG: 325 TABLET, FILM COATED ORAL at 22:40

## 2023-01-26 RX ADMIN — HYDROMORPHONE HYDROCHLORIDE 0.5 MG: 1 INJECTION, SOLUTION INTRAMUSCULAR; INTRAVENOUS; SUBCUTANEOUS at 03:53

## 2023-01-26 RX ADMIN — ONDANSETRON 4 MG: 2 INJECTION INTRAMUSCULAR; INTRAVENOUS at 00:26

## 2023-01-26 RX ADMIN — HYDROMORPHONE HYDROCHLORIDE 0.5 MG: 1 INJECTION, SOLUTION INTRAMUSCULAR; INTRAVENOUS; SUBCUTANEOUS at 14:09

## 2023-01-26 RX ADMIN — HYDROMORPHONE HYDROCHLORIDE 0.5 MG: 1 INJECTION, SOLUTION INTRAMUSCULAR; INTRAVENOUS; SUBCUTANEOUS at 20:59

## 2023-01-26 RX ADMIN — BENZONATATE 200 MG: 100 CAPSULE ORAL at 20:57

## 2023-01-26 RX ADMIN — ENOXAPARIN SODIUM 40 MG: 40 INJECTION SUBCUTANEOUS at 09:46

## 2023-01-26 RX ADMIN — SODIUM CHLORIDE 3 G: 9 INJECTION, SOLUTION INTRAVENOUS at 11:43

## 2023-01-26 RX ADMIN — SODIUM CHLORIDE 3 G: 9 INJECTION, SOLUTION INTRAVENOUS at 22:35

## 2023-01-26 RX ADMIN — HYDROMORPHONE HYDROCHLORIDE 0.5 MG: 1 INJECTION, SOLUTION INTRAMUSCULAR; INTRAVENOUS; SUBCUTANEOUS at 10:56

## 2023-01-26 RX ADMIN — BENZONATATE 200 MG: 100 CAPSULE ORAL at 17:59

## 2023-01-26 RX ADMIN — GUAIFENESIN 1200 MG: 600 TABLET, EXTENDED RELEASE ORAL at 14:05

## 2023-01-26 RX ADMIN — SODIUM CHLORIDE 3 G: 9 INJECTION, SOLUTION INTRAVENOUS at 18:07

## 2023-01-26 RX ADMIN — ONDANSETRON 4 MG: 2 INJECTION INTRAMUSCULAR; INTRAVENOUS at 14:05

## 2023-01-26 RX ADMIN — HYDROMORPHONE HYDROCHLORIDE 0.5 MG: 1 INJECTION, SOLUTION INTRAMUSCULAR; INTRAVENOUS; SUBCUTANEOUS at 07:45

## 2023-01-26 RX ADMIN — HYDROMORPHONE HYDROCHLORIDE 0.5 MG: 1 INJECTION, SOLUTION INTRAMUSCULAR; INTRAVENOUS; SUBCUTANEOUS at 17:58

## 2023-01-26 RX ADMIN — MAGNESIUM SULFATE HEPTAHYDRATE 2 G: 40 INJECTION, SOLUTION INTRAVENOUS at 21:00

## 2023-01-26 RX ADMIN — GUAIFENESIN 1200 MG: 600 TABLET, EXTENDED RELEASE ORAL at 20:57

## 2023-01-26 RX ADMIN — HYDROMORPHONE HYDROCHLORIDE 1 MG: 1 INJECTION, SOLUTION INTRAMUSCULAR; INTRAVENOUS; SUBCUTANEOUS at 00:26

## 2023-01-26 RX ADMIN — IOHEXOL 85 ML: 350 INJECTION, SOLUTION INTRAVENOUS at 11:24

## 2023-01-26 NOTE — ASSESSMENT & PLAN NOTE
78 F in ER with tachycardia   Source likely bilateral pneumonia - recently dcd from hospital - will  Need coverage for HCAP   Not in distress - reports feeling better since admission   Had ? Endocarditis in  Past ??    PLAN:   CT chest with contrast to evaluate pneumonia better   Pulmonary and ID consult

## 2023-01-26 NOTE — CONSULTS
Consultation - [facility] Gastroenterology   Riley Wallace 32 y o  male MRN: [id number]  Unit/Bed#: [address] [place] [address] Encounter: [id number]        Inpatient consult to gastroenterology  Consult performed by: KY Chan  Consult ordered by: Joana Stovall MD          Reason for Consult / Principal Problem:     LUQ pain      ASSESSMENT AND PLAN:      This is a 32year old with ankylosing spondylitis, thalassemia minor, UC s/p colectomy with J-pouch formation, pouchitis, s/p ileostomy 10/13/22 c/b proximal limb evisceration, purulent ascites & sepsis requiring revision/washout 10/18, and recent admission 1/18-1/24 for nausea/vomiting, abdominal pain, & high output ileostomy who presented 1/25 with cough, LUQ pain meeting sepsis criteria in the ED with new evidence of pneumonia L>R on CT  GI consulted due to LUQ pain  1  LUQ pain  LUQ pain may be secondary to underlying pneumonia vs underlying gastritis/duodenitis seen on recent EGD  Lipase WNL  Lactic acid now resolved  EGD 1/20/2023 which showed erosive gastritis and duodenitis  Antral biopsy negative for H  Pylori, duodenal biopsy consistent with peptic duodenitis and patient did report NSAID use prior to admission      -Avoid NSAIDs  -Continue home Pantoprazole 40 mg daily  -Low fiber/low residue diet  -Continue Questran 4g BID for high output ileostomy last admission  -Monitor ileostomy output  -Pulm following  -Continue follow up w/ [facility] colorectal    Thank you for the consultation   Case discussed with Dr Felicitas Leonard     ______________________________________________________________________    HPI:  Riley Wallace is a 32 y o  male with PMH of ankylosing spondylitis, thalassemia minor, UC s/p colectomy with J-pouch formation, pouchitis, surgery for ileostomy 10/13/22 c/b proximal limb evisceration, purulent ascites & sepsis requiring revision and washout 10/18, and recent admission 1/18-1/24 for nausea/vomiting, abdominal pain, & high output ileostomy who presented yesterday with fever, LUQ pain      In the ED, he met sepsis criteria with temperature 102 2, tachycardia, WBC 17 9, lactic 2 2, and new evidence of pneumonia L>R on CT & mildly increased mucosal thickening of the J-pouch with minimal ascites  Blood cultures were taken and he was given IV fluid bolus, Dilaudid, Zofran, Levaquin in the ED and pulmonary, ID, GI consulted for further evaluation  He was seen last admission for nausea/vomiting, abdominal pain and increased ileostomy output  He was checked for infectious etiology with C  difficile PCR, enteric stool, O&P/Giardia, rotavirus stool, COVID/flu/RSV which were negative and blood culture showed no growth  CRP was noted to be 43 5 up from 12 7 in November  He underwent EGD 1/20/2023 which showed erosive gastritis and duodenitis  Antral biopsy negative for H  Pylori, duodenal biopsy consistent with peptic duodenitis and patient did report NSAID use prior to admission  He was started on Questran which slowed his ileostomy output, nausea/vomiting resolved, abdominal pain improved and he was discharged on PPI/Questran  Denies any change in ileostomy output and was tolerating diet well at home prior to admission  Developed productive cough and acute onset LUQ pain prompting presentation  REVIEW OF SYSTEMS:    CONSTITUTIONAL: Denies any fever, chills, rigors, and weight loss  HEENT: No earache or tinnitus  Denies hearing loss or visual disturbances  CARDIOVASCULAR: No chest pain or palpitations  RESPIRATORY: Denies any cough, hemoptysis, shortness of breath or dyspnea on exertion  GASTROINTESTINAL: As noted in the History of Present Illness  GENITOURINARY: No problems with urination  Denies any hematuria or dysuria  NEUROLOGIC: No dizziness or vertigo, denies headaches  MUSCULOSKELETAL: Denies any muscle or joint pain  SKIN: Denies skin rashes or itching     ENDOCRINE: Denies excessive thirst  Denies intolerance to heat or cold  PSYCHOSOCIAL: Denies depression or anxiety  Denies any recent memory loss  Historical Information   Past Medical History:   Diagnosis Date   • Ankylosing spondylitis ([facility] )    • Anxiety    • Bowel obstruction ([facility] )    • Clostridium difficile colitis 9/13/2018   • Colitis    • 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[facility] ) 9/13/2018   • Pancreatitis    • Ulcerative colitis ([facility] )      Past Surgical History:   Procedure Laterality Date   • COLECTOMY TOTAL      with ileal pouch and anastemosis   • IR PICC PLACEMENT SINGLE LUMEN  3/1/2022   • TOTAL COLECTOMY       Social History   Social History     Substance and Sexual Activity   Alcohol Use Not Currently    Comment: pt states 5 a month/socially     Social History     Substance and Sexual Activity   Drug Use No     Social History     Tobacco Use   Smoking Status Never   Smokeless Tobacco Never     History reviewed  No pertinent family history      Meds/Allergies     Medications Prior to Admission   Medication   • acetaminophen (TYLENOL) 325 mg tablet   • cholestyramine sugar free (QUESTRAN LIGHT) 4 g packet   • DULoxetine (CYMBALTA) 60 mg delayed release capsule   • HYDROmorphone (DILAUDID) 2 mg tablet   • pantoprazole (PROTONIX) 40 mg tablet   • ferrous sulfate 324 (65 Fe) mg   • naloxone (NARCAN) 4 mg/0 1 mL nasal spray   • ondansetron (ZOFRAN-ODT) 4 mg disintegrating tablet     Current Facility-Administered Medications   Medication Dose Route Frequency   • acetaminophen (TYLENOL) tablet 650 mg  650 mg Oral Q6H PRN   • aluminum-magnesium hydroxide-simethicone (MYLANTA) oral suspension 30 mL  30 mL Oral Q6H PRN   • enoxaparin (LOVENOX) subcutaneous injection 40 mg  40 mg Subcutaneous Daily   • HYDROmorphone (DILAUDID) injection 0 5 mg  0 5 mg Intravenous Q3H PRN   • magnesium hydroxide (MILK OF MAGNESIA) oral suspension 30 mL  30 mL Oral Daily PRN   • ondansetron (ZOFRAN) injection 4 mg  4 mg Intravenous Q6H PRN   • prochlorperazine (COMPAZINE) tablet 5 mg  5 mg Oral Q6H PRN Allergies   Allergen Reactions   • Cefazolin Hives     Other reaction(s): Arrythmia (Abnormal Heartbeat), Rash Maculopapular   • Mesalamine GI Intolerance     Other reaction(s): Pancreatitis  [facility] [id number]: pancreatitis   • Wound Dressings Rash     Coloplast ostomy Products            Objective     Blood pressure 108/63, pulse 96, temperature 99 °F (37 2 °C), temperature source Oral, resp  rate 14, height 5' 9" (1 753 m), weight 82 kg (180 lb 12 8 oz), SpO2 94 %  Body mass index is 26 7 kg/m²  Intake/Output Summary (Last 24 hours) at 1/26/2023 0825  Last data filed at 1/26/2023 0803  Gross per 24 hour   Intake 1100 ml   Output 325 ml   Net 775 ml         PHYSICAL EXAM:      General Appearance:   Alert, cooperative, no distress   HEENT:   Normocephalic, atraumatic, anicteric  Neck:  Supple, symmetrical, trachea midline   Lungs:   Clear to auscultation bilaterally; no rales, rhonchi or wheezing; respirations unlabored    Heart[de-identified]   Regular rate and rhythm; no murmur, rub, or gallop  Abdomen:   Soft, LUQ tender, non-distended; normal bowel sounds; no masses, no organomegaly; ileostomy present   Genitalia:   Deferred    Rectal:   Deferred    Extremities:  No cyanosis, clubbing or edema    Pulses:  2+ and symmetric all extremities    Skin:  No jaundice, rashes, or lesions    Lymph nodes:  No palpable cervical lymphadenopathy        Lab Results:   Admission on 01/25/2023   Component Date Value   • WBC 01/25/2023 17 99 (H)    • RBC 01/25/2023 6 00 (H)    • Hemoglobin 01/25/2023 11 5 (L)    • Hematocrit 01/25/2023 37 4    • MCV 01/25/2023 62 (L)    • MCH 01/25/2023 19 2 (L)    • MCHC 01/25/2023 30 7 (L)    • RDW 01/25/2023 17 4 (H)    • MPV 01/25/2023 8 1 (L)    • Platelets 40/23/9272 528 (H)    • Protime 01/25/2023 13 4    • INR 01/25/2023 1 01    • PTT 01/25/2023 30    • Blood Culture 01/25/2023 Received in Microbiology Lab  Culture in Progress      • Blood Culture 01/25/2023 Received in Microbiology Lab  Culture in Progress      • Sodium 01/25/2023 138    • Potassium 01/25/2023 3 3 (L)    • Chloride 01/25/2023 101    • CO2 01/25/2023 27    • ANION GAP 01/25/2023 10    • BUN 01/25/2023 10    • Creatinine 01/25/2023 1 20    • Glucose 01/25/2023 120    • Calcium 01/25/2023 9 4    • AST 01/25/2023 23    • ALT 01/25/2023 34    • Alkaline Phosphatase 01/25/2023 118 (H)    • Total Protein 01/25/2023 8 0    • Albumin 01/25/2023 4 0    • Total Bilirubin 01/25/2023 0 59    • eGFR 01/25/2023 81    • Lipase 01/25/2023 83    • Color, UA 01/25/2023 Light Yellow    • Clarity, UA 01/25/2023 Clear    • Specific Gravity, UA 01/25/2023 <=1 005    • pH, UA 01/25/2023 6 5    • Leukocytes, UA 01/25/2023 Negative    • Nitrite, UA 01/25/2023 Negative    • Protein, UA 01/25/2023 Negative    • Glucose, UA 01/25/2023 Negative    • Ketones, UA 01/25/2023 Trace (A)    • Urobilinogen, UA 01/25/2023 0 2    • Bilirubin, UA 01/25/2023 Negative    • Occult Blood, UA 01/25/2023 Negative    • LACTIC ACID 01/25/2023 2 2 (HH)    • Segmented % 01/25/2023 86 (H)    • Bands % 01/25/2023 7    • Lymphocytes % 01/25/2023 3 (L)    • Monocytes % 01/25/2023 2 (L)    • Eosinophils, % 01/25/2023 0    • Basophils % 01/25/2023 2 (H)    • Absolute Neutrophils 01/25/2023 16 73 (H)    • Lymphocytes Absolute 01/25/2023 0 54 (L)    • Monocytes Absolute 01/25/2023 0 36    • Eosinophils Absolute 01/25/2023 0 00    • Basophils Absolute 01/25/2023 0 36 (H)    • RBC Morphology 01/25/2023 Present    • Basophilic Stippling 62/33/3264 Present    • Hypochromia 01/25/2023 Present    • Microcytes 01/25/2023 Present    • Target Cells 01/25/2023 Present    • Platelet Estimate 28/16/9773 Increased (A)    • LACTIC ACID 01/25/2023 0 9    • SARS-CoV-2 01/25/2023 Negative    • INFLUENZA A PCR 01/25/2023 Negative    • INFLUENZA B PCR 01/25/2023 Negative    • RSV PCR 01/25/2023 Negative    • Sodium 01/26/2023 136    • Potassium 01/26/2023 3 8    • Chloride 01/26/2023 100 • CO2 01/26/2023 28    • ANION GAP 01/26/2023 8    • BUN 01/26/2023 8    • Creatinine 01/26/2023 1 14    • Glucose 01/26/2023 88    • Calcium 01/26/2023 9 0    • Corrected Calcium 01/26/2023 9 5    • AST 01/26/2023 18    • ALT 01/26/2023 25    • Alkaline Phosphatase 01/26/2023 94    • Total Protein 01/26/2023 6 7    • Albumin 01/26/2023 3 4 (L)    • Total Bilirubin 01/26/2023 0 80    • eGFR 01/26/2023 86    • PTT 01/26/2023 37    • Protime 01/26/2023 15 5 (H)    • INR 01/26/2023 1 22 (H)    • WBC 01/26/2023 28 08 (H)    • RBC 01/26/2023 5 23    • Hemoglobin 01/26/2023 9 9 (L)    • Hematocrit 01/26/2023 32 9 (L)    • MCV 01/26/2023 63 (L)    • MCH 01/26/2023 18 9 (L)    • MCHC 01/26/2023 30 1 (L)    • RDW 01/26/2023 16 2 (H)    • MPV 01/26/2023 8 3 (L)    • Platelets 24/08/1780 455 (H)    • nRBC 01/26/2023 0    • Neutrophils Relative 01/26/2023 82 (H)    • Immat GRANS % 01/26/2023 2    • Lymphocytes Relative 01/26/2023 7 (L)    • Monocytes Relative 01/26/2023 9    • Eosinophils Relative 01/26/2023 0    • Basophils Relative 01/26/2023 0    • Neutrophils Absolute 01/26/2023 22 91 (H)    • Immature Grans Absolute 01/26/2023 >0 50 (H)    • Lymphocytes Absolute 01/26/2023 1 83    • Monocytes Absolute 01/26/2023 2 57 (H)    • Eosinophils Absolute 01/26/2023 0 05    • Basophils Absolute 01/26/2023 0 12 (H)    • Phosphorus 01/26/2023 3 5    • Lipase 01/26/2023 36 (L)    • Magnesium 01/26/2023 1 4 (L)    • Calcium, Ionized 01/26/2023 1 12    • LACTIC ACID 01/26/2023 1 5        Imaging Studies: I have personally reviewed pertinent imaging studies

## 2023-01-26 NOTE — TELEPHONE ENCOUNTER
----- Message from [facility], DO sent at 1/25/2023  1:21 AM EST -----  Regarding: RE: Discharge  Thank you for allowing us to participate in the care of your patient, Dirk Austin, who was hospitalized from 1/18/2023 through 1/25/2023 with the admitting diagnosis of abdominal pain, N/V/diarhhea  Underwent egd here and started on questran with improvement in diarrhea      If you have any additional questions or would like to discuss further, please feel free to contact me      [facility], [address] [facility] Internal Medicine, Hospitalist  [phone]

## 2023-01-26 NOTE — PLAN OF CARE
Problem: PAIN - ADULT  Goal: Verbalizes/displays adequate comfort level or baseline comfort level  Description: Interventions:  - Encourage patient to monitor pain and request assistance  - Assess pain using appropriate pain scale  - Administer analgesics based on type and severity of pain and evaluate response  - Implement non-pharmacological measures as appropriate and evaluate response  - Consider cultural and social influences on pain and pain management  - Notify physician/advanced practitioner if interventions unsuccessful or patient reports new pain  Outcome: Progressing     Problem: INFECTION - ADULT  Goal: Absence or prevention of progression during hospitalization  Description: INTERVENTIONS:  - Assess and monitor for signs and symptoms of infection  - Monitor lab/diagnostic results  - Monitor all insertion sites, i e  indwelling lines, tubes, and drains  - Monitor endotracheal if appropriate and nasal secretions for changes in amount and color  - [place] appropriate cooling/warming therapies per order  - Administer medications as ordered  - Instruct and encourage patient and family to use good hand hygiene technique  - Identify and instruct in appropriate isolation precautions for identified infection/condition  Outcome: Progressing  Goal: Absence of fever/infection during neutropenic period  Description: INTERVENTIONS:  - Monitor WBC    Outcome: Progressing     Problem: RESPIRATORY - ADULT  Goal: Achieves optimal ventilation and oxygenation  Description: INTERVENTIONS:  - Assess for changes in respiratory status  - Assess for changes in mentation and behavior  - Position to facilitate oxygenation and minimize respiratory effort  - Oxygen administered by appropriate delivery if ordered  - Initiate smoking cessation education as indicated  - Encourage broncho-pulmonary hygiene including cough, deep breathe, Incentive Spirometry  - Assess the need for suctioning and aspirate as needed  - Assess and instruct to report SOB or any respiratory difficulty  - Respiratory Therapy support as indicated  Outcome: Progressing     Problem: GASTROINTESTINAL - ADULT  Goal: Minimal or absence of nausea and/or vomiting  Description: INTERVENTIONS:  - Administer IV fluids if ordered to ensure adequate hydration  - Maintain NPO status until nausea and vomiting are resolved  - Nasogastric tube if ordered  - Administer ordered antiemetic medications as needed  - Provide nonpharmacologic comfort measures as appropriate  - Advance diet as tolerated, if ordered  - Consider nutrition services referral to assist patient with adequate nutrition and appropriate food choices  Outcome: Progressing  Goal: Maintains or returns to baseline bowel function  Description: INTERVENTIONS:  - Assess bowel function  - Encourage oral fluids to ensure adequate hydration  - Administer IV fluids if ordered to ensure adequate hydration  - Administer ordered medications as needed  - Encourage mobilization and activity  - Consider nutritional services referral to assist patient with adequate nutrition and appropriate food choices  Outcome: Progressing  Goal: Maintains adequate nutritional intake  Description: INTERVENTIONS:  - Monitor percentage of each meal consumed  - Identify factors contributing to decreased intake, treat as appropriate  - Assist with meals as needed  - Monitor I&O, weight, and lab values if indicated  - Obtain nutrition services referral as needed  Outcome: Progressing  Goal: Establish and maintain optimal ostomy function  Description: INTERVENTIONS:  - Assess bowel function  - Encourage oral fluids to ensure adequate hydration  - Administer IV fluids if ordered to ensure adequate hydration   - Administer ordered medications as needed  - Encourage mobilization and activity  - Nutrition services referral to assist patient with appropriate food choices  - Assess stoma site  - Consider wound care consult   Outcome: Progressing  Goal: Oral mucous membranes remain intact  Description: INTERVENTIONS  - Assess oral mucosa and hygiene practices  - Implement preventative oral hygiene regimen  - Implement oral medicated treatments as ordered  - Initiate Nutrition services referral as needed  Outcome: Progressing     Problem: SKIN/TISSUE INTEGRITY - ADULT  Goal: Skin Integrity remains intact(Skin Breakdown Prevention)  Description: Assess:  -Perform Ervin assessment every shift  -Clean and moisturize skin every shift  -Inspect skin when repositioning, toileting, and assisting with ADLS  -Assess under medical devices such as masimo every shift  -Assess extremities for adequate circulation and sensation     Bed Management:  -Have minimal linens on bed & keep smooth, unwrinkled  -Change linens as needed when moist or perspiring  -Avoid sitting or lying in one position for more than 2 hours while in bed  -Keep HOB at 30 degrees or as tolerated      Toileting:  -Offer bedside commode  -Assess for incontinence every shift  -Use incontinent care products after each incontinent episode such as Remedy    Activity:  -Mobilize patient 3 times a day  -Encourage activity and walks on unit  -Encourage or provide ROM exercises   -Turn and reposition patient every 2 Hours  -Use appropriate equipment to lift or move patient in bed  -Instruct/ Assist with weight shifting every shift when out of bed in chair  -Consider limitation of chair time 2 hour intervals    Skin Care:  -Avoid use of baby powder, tape, friction and shearing, hot water or constrictive clothing  -Relieve pressure over bony prominences using pillows  -Do not massage red bony areas    Next Steps:  -Teach patient strategies to minimize risks such as proper hand washing   -Consider consults to  interdisciplinary teams  Outcome: Progressing  Goal: Incision(s), wounds(s) or drain site(s) healing without S/S of infection  Description: INTERVENTIONS  - Assess and document dressing, incision, wound bed, drain sites and surrounding tissue  - Provide patient and family education  - Perform skin care every shift  Outcome: Progressing     Problem: HEMATOLOGIC - ADULT  Goal: Maintains hematologic stability  Description: INTERVENTIONS  - Assess for signs and symptoms of bleeding or hemorrhage  - Monitor labs  - Administer supportive blood products/factors as ordered and appropriate  Outcome: Progressing

## 2023-01-26 NOTE — ASSESSMENT & PLAN NOTE
Recommended outpatient pain management referral for management of chronic abd pain but patient declined during previous admissions  • On Dilaudid here - 0 5 mg IV every 3 hours prn

## 2023-01-26 NOTE — H&P
Julian 43  H&P- Jamie Scott 1995, 32 y o  male MRN: [id number]  Unit/Bed#: [address] [place] [address] Encounter: [id number]  Primary Care Provider: Judson Briscoe DO   Date and time admitted to hospital: 1/25/2023  7:43 PM    Pneumonia  Assessment & Plan  - seen as bilateral lower lobe involvement   - CT chest with contrast today to evaluate further   - will need HCAP coverage as dcd 2 days ago from hospital   - no SOB or chest pain or respiratory distress   - productive cough with green sputum   - IVF, septic work up and sputum studies and  Abx and ID consult     Fever  Assessment & Plan  78 F in ER with tachycardia   Source likely bilateral pneumonia - recently dcd from hospital - will  Need coverage for HCAP   Not in distress - reports feeling better since admission   Had ? Endocarditis in  Past ?? PLAN:   CT chest with contrast to evaluate pneumonia better   Pulmonary and ID consult    Chronic pain syndrome  Assessment & Plan  Recommended outpatient pain management referral for management of chronic abd pain but patient declined during previous admissions  • On Dilaudid here - 0 5 mg IV every 3 hours prn    Ileostomy present ([address] )  Assessment & Plan  Managed by [facility]  Patient reports changing ostomy bag every 3 to 4 days  Patient is requesting GI consult    Abdominal pain  Assessment & Plan  - upper left quadrant abdominal discomfort likely secondary to pneumonia involving lower lobe of lung bilaterally   constant but no signs of acute abdomen   - CT scan of abdomen pelvis with contrast reveals bilateral pneumonia   with left greater than right   - productive cough with green sputum  - not in resp distress   PLAN:   Treat pneumonia   Pain control   GI consult on patient;s request     VTE Pharmacologic Prophylaxis: VTE Score: 3 Moderate Risk (Score 3-4) - Pharmacological DVT Prophylaxis Ordered: enoxaparin (Lovenox)    Code Status: Level 1 - Full Code    Discussion with family: Patient declined call to   Anticipated Length of Stay: Patient will be admitted on an inpatient basis with an anticipated length of stay of greater than 2 midnights secondary to sepsis  Total Time for Visit, including Counseling / Coordination of Care: 30 minutes Greater than 50% of this total time spent on direct patient counseling and coordination of care  Chief Complaint: fever coughing and left upper abdominal discomfort    History of Present Illness:  Hang Hartman is a 32 y o  male with a PMH of ulcerative colitis and ileostomy managed in [address] [facility] in [address] [place] [address], chronic pain syndrome and generalized anxiety disorder  Patient was dcd 2 days ago after being treated for abdominal pain nausea vomiting  He was started on questran by GI and cleared for DC  Patient returns with left upper abdominal pain and fever of    89 F in ER, tachycardia and CT abdomen pelvis reveals bilateral pneumonia left more than right in lower lobes  Patient had elevated lactic acid on arrival  He rcvd Abx, IVF and was stabilized and admitted to floors for management of his Sepsis likely secondary to pneumonia  Patient reports green sputum but no SOB or chest pain  Respiratory panel including COVID was negative in ER  Review of Systems:  Review of Systems   Constitutional: Positive for fatigue and fever  Respiratory: Positive for cough  Gastrointestinal:        Left upper abdominal pain    All other systems reviewed and are negative        Past Medical and Surgical History:   Past Medical History:   Diagnosis Date   • Ankylosing spondylitis ([address] )    • Anxiety    • Bowel obstruction ([address] )    • Clostridium difficile colitis 9/13/2018   • Colitis    • 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[address] ) 9/13/2018   • Pancreatitis    • Ulcerative colitis ([address] )        Past Surgical History:   Procedure Laterality Date   • COLECTOMY TOTAL      with ileal pouch and anastemosis   • IR PICC PLACEMENT SINGLE LUMEN  3/1/2022 • TOTAL COLECTOMY         Meds/Allergies:  Prior to Admission medications    Medication Sig Start Date End Date Taking? Authorizing Provider   acetaminophen (TYLENOL) 325 mg tablet Take 2 tablets (650 mg total) by mouth every 6 (six) hours as needed for mild pain, headaches or fever 1/24/23  Yes Darlin Martinez DO   cholestyramine sugar free (QUESTRAN LIGHT) 4 g packet Take 2 packets (8 g total) by mouth 2 (two) times a day 1/24/23 2/23/23 Yes Darlin Martinez DO   DULoxetine (CYMBALTA) 60 mg delayed release capsule Take 1 capsule (60 mg total) by mouth daily 6/10/22  Yes Ricky Finch DO   HYDROmorphone (DILAUDID) 2 mg tablet Take 1 tablet (2 mg total) by mouth every 8 (eight) hours as needed for severe pain for up to 12 doses Max Daily Amount: 6 mg 1/24/23  Yes Darlin Martinez DO   pantoprazole (PROTONIX) 40 mg tablet Take 1 tablet (40 mg total) by mouth daily 1/24/23 2/23/23 Yes Darlin Martinez DO   ferrous sulfate 324 (65 Fe) mg Take 1 tablet (324 mg total) by mouth daily before breakfast  Patient not taking: Reported on 1/26/2023 1/24/23   Darlin Martinez DO   naloxone (NARCAN) 4 mg/0 1 mL nasal spray Administer 1 spray into a nostril  If no response after 2-3 minutes, give another dose in the other nostril using a new spray  Patient not taking: Reported on 1/26/2023 1/24/23   Darlin Martinez DO   ondansetron (ZOFRAN-ODT) 4 mg disintegrating tablet Take 1 tablet (4 mg total) by mouth every 6 (six) hours as needed for nausea for up to 3 days 1/18/23 1/21/23  Cassie King DO     I have reviewed home medications using recent Epic encounter  Allergies:    Allergies   Allergen Reactions   • Cefazolin Hives     Other reaction(s): Arrythmia (Abnormal Heartbeat), Rash Maculopapular   • Mesalamine GI Intolerance     Other reaction(s): Pancreatitis  [facility] - [id number]: pancreatitis   • Wound Dressings Rash     [employer] ostomy Products        Social History:  Marital Status: Single      Patient Pre-hospital Living Situation: Home  Patient Pre-hospital Level of Mobility: walks     Substance Use History:   Social History     Substance and Sexual Activity   Alcohol Use Not Currently    Comment: pt states 5 a month/socially     Social History     Tobacco Use   Smoking Status Never   Smokeless Tobacco Never     Social History     Substance and Sexual Activity   Drug Use No       Family History:  History reviewed  No pertinent family history  Physical Exam:     Vitals:   Blood Pressure: 108/63 (01/26/23 0751)  Pulse: 96 (01/26/23 0751)  Temperature: 99 °F (37 2 °C) (01/26/23 0751)  Temp Source: Oral (01/25/23 2054)  Respirations: 13 (01/26/23 0100)  Height: 5' 9" (175 3 cm) (01/26/23 0145)  Weight - Scale: 82 kg (180 lb 12 8 oz) (01/26/23 0145)  SpO2: 94 % (01/26/23 0751)    Physical Exam  Constitutional:       Appearance: Normal appearance  HENT:      Head: Normocephalic and atraumatic  Mouth/Throat:      Mouth: Mucous membranes are moist    Eyes:      Extraocular Movements: Extraocular movements intact  Pupils: Pupils are equal, round, and reactive to light  Abdominal:      Palpations: Abdomen is soft  Tenderness: There is no abdominal tenderness  There is no guarding or rebound  Comments: Ileostomy = at baseline per pt   Musculoskeletal:         General: Normal range of motion  Cervical back: Normal range of motion and neck supple  Skin:     General: Skin is warm and dry  Neurological:      General: No focal deficit present  Mental Status: He is alert and oriented to person, place, and time  Mental status is at baseline             Additional Data:     Lab Results:  Results from last 7 days   Lab Units 01/26/23  0416 01/25/23 1947   WBC Thousand/uL 28 08* 17 99*   HEMOGLOBIN g/dL 9 9* 11 5*   HEMATOCRIT % 32 9* 37 4   PLATELETS Thousands/uL 455* 528*   BANDS PCT %  --  7   NEUTROS PCT % 82*  --    LYMPHS PCT % 7*  --    LYMPHO PCT %  --  3*   MONOS PCT % 9  --    MONO PCT %  --  2*   EOS PCT % 0 0     Results from last 7 days   Lab Units 01/26/23  0416   SODIUM mmol/L 136   POTASSIUM mmol/L 3 8   CHLORIDE mmol/L 100   CO2 mmol/L 28   BUN mg/dL 8   CREATININE mg/dL 1 14   ANION GAP mmol/L 8   CALCIUM mg/dL 9 0   ALBUMIN g/dL 3 4*   TOTAL BILIRUBIN mg/dL 0 80   ALK PHOS U/L 94   ALT U/L 25   AST U/L 18   GLUCOSE RANDOM mg/dL 88     Results from last 7 days   Lab Units 01/26/23  0416   INR  1 22*             Results from last 7 days   Lab Units 01/26/23  0416 01/25/23  2143 01/25/23  1947   LACTIC ACID mmol/L 1 5 0 9 2 2*       Lines/Drains:  Invasive Devices     Peripheral Intravenous Line  Duration           Peripheral IV 01/25/23 Right Antecubital <1 day          Drain  Duration           Ileostomy Continent (Abdominal pouch) RLQ 66 days                    Imaging: Reviewed radiology reports from this admission including: abdominal/pelvic CT  CT abdomen pelvis with contrast   Final Result by Yamila Ramsey MD (01/25 2159)   New bilateral lower lobe airspace disease, left greater than right consistent with pneumonia  Stable postsurgical changes status post colectomy with right ileostomy  No obstruction  Mildly increased mucosal thickening of the J-pouch with new minimal ascites  Study marked in Epic for notification  Workstation performed: [id number]         XR chest portable - 1 view   Final Result by Charley Tao MD (01/26 0691)      No acute cardiopulmonary disease  Workstation performed: [id number]         CT chest w contrast    (Results Pending)       EKG and Other Studies Reviewed on Admission:   · EKG: NSR    ** Please Note: This note has been constructed using a voice recognition system   **

## 2023-01-26 NOTE — ASSESSMENT & PLAN NOTE
- upper left quadrant abdominal discomfort likely secondary to pneumonia involving lower lobe of lung bilaterally   constant but no signs of acute abdomen   - CT scan of abdomen pelvis with contrast reveals bilateral pneumonia   with left greater than right   - productive cough with green sputum     - not in resp distress   PLAN:   Treat pneumonia   Pain control   GI consult on patient;s request

## 2023-01-26 NOTE — PLAN OF CARE
Problem: PAIN - ADULT  Goal: Verbalizes/displays adequate comfort level or baseline comfort level  Description: Interventions:  - Encourage patient to monitor pain and request assistance  - Assess pain using appropriate pain scale  - Administer analgesics based on type and severity of pain and evaluate response  - Implement non-pharmacological measures as appropriate and evaluate response  - Consider cultural and social influences on pain and pain management  - Notify physician/advanced practitioner if interventions unsuccessful or patient reports new pain  Outcome: Progressing     Problem: INFECTION - ADULT  Goal: Absence or prevention of progression during hospitalization  Description: INTERVENTIONS:  - Assess and monitor for signs and symptoms of infection  - Monitor lab/diagnostic results  - Monitor all insertion sites, i e  indwelling lines, tubes, and drains  - Monitor endotracheal if appropriate and nasal secretions for changes in amount and color  - [place] appropriate cooling/warming therapies per order  - Administer medications as ordered  - Instruct and encourage patient and family to use good hand hygiene technique  - Identify and instruct in appropriate isolation precautions for identified infection/condition  Outcome: Progressing  Goal: Absence of fever/infection during neutropenic period  Description: INTERVENTIONS:  - Monitor WBC    Outcome: Progressing     Problem: RESPIRATORY - ADULT  Goal: Achieves optimal ventilation and oxygenation  Description: INTERVENTIONS:  - Assess for changes in respiratory status  - Assess for changes in mentation and behavior  - Position to facilitate oxygenation and minimize respiratory effort  - Oxygen administered by appropriate delivery if ordered  - Initiate smoking cessation education as indicated  - Encourage broncho-pulmonary hygiene including cough, deep breathe, Incentive Spirometry  - Assess the need for suctioning and aspirate as needed  - Assess and instruct to report SOB or any respiratory difficulty  - Respiratory Therapy support as indicated  Outcome: Progressing     Problem: GASTROINTESTINAL - ADULT  Goal: Minimal or absence of nausea and/or vomiting  Description: INTERVENTIONS:  - Administer IV fluids if ordered to ensure adequate hydration  - Maintain NPO status until nausea and vomiting are resolved  - Nasogastric tube if ordered  - Administer ordered antiemetic medications as needed  - Provide nonpharmacologic comfort measures as appropriate  - Advance diet as tolerated, if ordered  - Consider nutrition services referral to assist patient with adequate nutrition and appropriate food choices  Outcome: Progressing  Goal: Maintains or returns to baseline bowel function  Description: INTERVENTIONS:  - Assess bowel function  - Encourage oral fluids to ensure adequate hydration  - Administer IV fluids if ordered to ensure adequate hydration  - Administer ordered medications as needed  - Encourage mobilization and activity  - Consider nutritional services referral to assist patient with adequate nutrition and appropriate food choices  Outcome: Progressing  Goal: Maintains adequate nutritional intake  Description: INTERVENTIONS:  - Monitor percentage of each meal consumed  - Identify factors contributing to decreased intake, treat as appropriate  - Assist with meals as needed  - Monitor I&O, weight, and lab values if indicated  - Obtain nutrition services referral as needed  Outcome: Progressing  Goal: Establish and maintain optimal ostomy function  Description: INTERVENTIONS:  - Assess bowel function  - Encourage oral fluids to ensure adequate hydration  - Administer IV fluids if ordered to ensure adequate hydration   - Administer ordered medications as needed  - Encourage mobilization and activity  - Nutrition services referral to assist patient with appropriate food choices  - Assess stoma site  - Consider wound care consult   Outcome: Progressing  Goal: Oral mucous membranes remain intact  Description: INTERVENTIONS  - Assess oral mucosa and hygiene practices  - Implement preventative oral hygiene regimen  - Implement oral medicated treatments as ordered  - Initiate Nutrition services referral as needed  Outcome: Progressing     Problem: SKIN/TISSUE INTEGRITY - ADULT  Goal: Skin Integrity remains intact(Skin Breakdown Prevention)  Description: Assess:  -Perform Ervin assessment every day  Problem: HEMATOLOGIC - ADULT  Goal: Maintains hematologic stability  Description: INTERVENTIONS  - Assess for signs and symptoms of bleeding or hemorrhage  - Monitor labs  - Administer supportive blood products/factors as ordered and appropriate  Outcome: Progressing     -Inspect skin when assisting with ADLS  -Assess under medical devices such as colostomy strap  -Assess extremities for adequate circulation and sensation     Bed Management:  -Have minimal linens on bed & keep smooth, unwrinkled  -Change linens as needed when moist or perspiring    Activity:  -Encourage activity and walks on unit  -Encourage or provide ROM exercises   -Instruct/ Assist with weight shifting every hour when out of bed in chair    Skin Care:  -Avoid use of baby powder, tape, friction and shearing, hot water or constrictive clothing  -Do not massage red bony areas    Next Steps:  -Teach patient strategies to minimize risks such as move and reposition frequently  -Consider consults to  interdisciplinary teams such as ID  Outcome: Progressing  Goal: Incision(s), wounds(s) or drain site(s) healing without S/S of infection  Description: INTERVENTIONS  - Assess and document dressing, incision, wound bed, drain sites and surrounding tissue  - Provide patient and family education  - Perform skin care/every day  Outcome: Progressing

## 2023-01-26 NOTE — ED PROVIDER NOTES
History  Chief Complaint   Patient presents with   • Abdominal Pain     Worsening abd pain X 1 day  Pt has extensive hx  D/C from [facility] yesterday  Fever of 102 upon arrival     • Fever - 9 weeks to 76 years     23-year-old male presents the ED with worsening abdominal pain for the last 5 to 6 hours  Patient states he has had extensive history of abdominal surgery in the past discharged from [address] [place] [address] yesterday  He states that GI scoped him and did find some ulcerations some bleeding in his duodenum and they placed him on Questran as well as Protonix  He stated he felt fine was doing well yesterday and today he started with increased pain in the left upper quadrant  Patient does have a 102 fever  No other complaints      History provided by:  Patient   used: No        Prior to Admission Medications   Prescriptions Last Dose Informant Patient Reported? Taking? DULoxetine (CYMBALTA) 60 mg delayed release capsule   No No   Sig: Take 1 capsule (60 mg total) by mouth daily   HYDROmorphone (DILAUDID) 2 mg tablet   No No   Sig: Take 1 tablet (2 mg total) by mouth every 8 (eight) hours as needed for severe pain for up to 12 doses Max Daily Amount: 6 mg   acetaminophen (TYLENOL) 325 mg tablet   No No   Sig: Take 2 tablets (650 mg total) by mouth every 6 (six) hours as needed for mild pain, headaches or fever   cholestyramine sugar free (QUESTRAN LIGHT) 4 g packet   No No   Sig: Take 2 packets (8 g total) by mouth 2 (two) times a day   ferrous sulfate 324 (65 Fe) mg   No No   Sig: Take 1 tablet (324 mg total) by mouth daily before breakfast   naloxone (NARCAN) 4 mg/0 1 mL nasal spray   No No   Sig: Administer 1 spray into a nostril  If no response after 2-3 minutes, give another dose in the other nostril using a new spray     ondansetron (ZOFRAN-ODT) 4 mg disintegrating tablet   No No   Sig: Take 1 tablet (4 mg total) by mouth every 6 (six) hours as needed for nausea for up to 3 days pantoprazole (PROTONIX) 40 mg tablet   No No   Sig: Take 1 tablet (40 mg total) by mouth daily      Facility-Administered Medications: None       Past Medical History:   Diagnosis Date   • Ankylosing spondylitis (HCC)    • Anxiety    • Bowel obstruction (HCC)    • Clostridium difficile colitis 9/13/2018   • Colitis    • 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[facility] [address] ) 9/13/2018   • Pancreatitis    • Ulcerative colitis ([facility] )        Past Surgical History:   Procedure Laterality Date   • COLECTOMY TOTAL      with ileal pouch and anastemosis   • IR PICC PLACEMENT SINGLE LUMEN  3/1/2022   • TOTAL COLECTOMY         History reviewed  No pertinent family history  I have reviewed and agree with the history as documented  E-Cigarette/Vaping   • E-Cigarette Use Never User      E-Cigarette/Vaping Substances   • Nicotine No    • THC No    • CBD No    • Flavoring No    • Other No    • Unknown No      Social History     Tobacco Use   • Smoking status: Never   • Smokeless tobacco: Never   Vaping Use   • Vaping Use: Never used   Substance Use Topics   • Alcohol use: Not Currently     Comment: pt states 5 a month/socially   • Drug use: No       Review of Systems   Constitutional: Negative for activity change, chills, diaphoresis and fever  HENT: Negative for congestion, ear pain, nosebleeds, sore throat, trouble swallowing and voice change  Eyes: Negative for pain, discharge and redness  Respiratory: Negative for apnea, cough, choking, shortness of breath, wheezing and stridor  Cardiovascular: Negative for chest pain and palpitations  Gastrointestinal: Positive for abdominal pain and nausea  Negative for abdominal distention, constipation, diarrhea and vomiting  Endocrine: Negative for polydipsia  Genitourinary: Negative for difficulty urinating, dysuria, flank pain, frequency, hematuria and urgency  Musculoskeletal: Negative for back pain, gait problem, joint swelling, myalgias, neck pain and neck stiffness     Skin: Negative for pallor and rash  Neurological: Negative for dizziness, tremors, syncope, speech difficulty, weakness, numbness and headaches  Hematological: Negative for adenopathy  Psychiatric/Behavioral: Negative for confusion, hallucinations, self-injury and suicidal ideas  The patient is not nervous/anxious  Physical Exam  Physical Exam  Vitals and nursing note reviewed  Constitutional:       General: He is not in acute distress  Appearance: He is well-developed  He is not diaphoretic  HENT:      Head: Normocephalic and atraumatic  Right Ear: External ear normal       Left Ear: External ear normal       Nose: Nose normal    Eyes:      Conjunctiva/sclera: Conjunctivae normal       Pupils: Pupils are equal, round, and reactive to light  Cardiovascular:      Rate and Rhythm: Normal rate and regular rhythm  Heart sounds: Normal heart sounds  Pulmonary:      Effort: Pulmonary effort is normal       Breath sounds: Normal breath sounds  Abdominal:      General: Bowel sounds are normal       Palpations: Abdomen is soft  Tenderness: There is abdominal tenderness in the left upper quadrant  Musculoskeletal:         General: Normal range of motion  Cervical back: Normal range of motion and neck supple  Skin:     General: Skin is warm and dry  Neurological:      Mental Status: He is alert and oriented to person, place, and time  Deep Tendon Reflexes: Reflexes are normal and symmetric           Vital Signs  ED Triage Vitals   Temperature Pulse Respirations Blood Pressure SpO2   01/25/23 1944 01/25/23 2002 01/25/23 2002 01/25/23 2002 01/25/23 2002   (!) 102 2 °F (39 °C) (!) 132 22 124/56 95 %      Temp Source Heart Rate Source Patient Position - Orthostatic VS BP Location FiO2 (%)   01/25/23 1944 01/25/23 2002 01/25/23 2002 01/25/23 2002 --   Oral Monitor Lying Left arm       Pain Score       01/25/23 2000       10 - Worst Possible Pain           Vitals:    01/25/23 2330 01/26/23 0000 01/26/23 0030 01/26/23 0100   BP: 108/61 106/63 110/67 106/58   Pulse: (!) 110 (!) 109 103 97   Patient Position - Orthostatic VS:   Lying          Visual Acuity      ED Medications  Medications   ondansetron (ZOFRAN) injection 4 mg (0 mg Intravenous Not Given 1/25/23 2010)   HYDROmorphone (DILAUDID) injection 1 mg (1 mg Intravenous Given 1/25/23 2000)   sodium chloride 0 9 % bolus 1,000 mL (0 mL Intravenous Stopped 1/25/23 2137)   levofloxacin (LEVAQUIN) IVPB (premix in dextrose) 500 mg 100 mL (0 mg Intravenous Stopped 1/25/23 2148)   potassium chloride (K-DUR,KLOR-CON) CR tablet 40 mEq (40 mEq Oral Given 1/25/23 2106)   iohexol (OMNIPAQUE) 350 MG/ML injection (SINGLE-DOSE) 100 mL (100 mL Intravenous Given 1/25/23 2127)   HYDROmorphone (DILAUDID) injection 1 mg (1 mg Intravenous Given 1/25/23 2154)   HYDROmorphone (DILAUDID) injection 1 mg (1 mg Intravenous Given 1/26/23 0026)   ondansetron (ZOFRAN) injection 4 mg (4 mg Intravenous Given 1/26/23 0026)       Diagnostic Studies  Results Reviewed     Procedure Component Value Units Date/Time    Blood culture #1 [[id number]] Collected: 01/25/23 1947    Lab Status: Preliminary result Specimen: Blood from Arm, Right Updated: 01/26/23 0101     Blood Culture Received in Microbiology Lab  Culture in Progress  Blood culture #2 [[id number]] Collected: 01/25/23 2002    Lab Status: Preliminary result Specimen: Blood from Hand, Right Updated: 01/26/23 0101     Blood Culture Received in Microbiology Lab  Culture in Progress      UA (URINE) with reflex to Scope [[id number]]  (Abnormal) Collected: 01/25/23 2234    Lab Status: Final result Specimen: Urine, Clean Catch Updated: 01/25/23 2243     Color, UA Light Yellow     Clarity, UA Clear     Specific Gravity, UA <=1 005     pH, UA 6 5     Leukocytes, UA Negative     Nitrite, UA Negative     Protein, UA Negative mg/dl      Glucose, UA Negative mg/dl      Ketones, UA Trace mg/dl      Urobilinogen, UA 0 2 E U /dl      Bilirubin, UA Negative     Occult Blood, UA Negative    Urine culture [[id number]] Collected: 01/25/23 2234    Lab Status: In process Specimen: Urine, Clean Catch Updated: 01/25/23 2238    FLU/RSV/COVID - if FLU/RSV clinically relevant [[id number]]  (Normal) Collected: 01/25/23 2129    Lab Status: Final result Specimen: Nares from Nose Updated: 01/25/23 2229     SARS-CoV-2 Negative     INFLUENZA A PCR Negative     INFLUENZA B PCR Negative     RSV PCR Negative    Narrative:      FOR PEDIATRIC PATIENTS - copy/paste COVID Guidelines URL to browser: [email or web address]    SARS-CoV-2 assay is a Nucleic Acid Amplification assay intended for the  qualitative detection of nucleic acid from SARS-CoV-2 in nasopharyngeal  swabs  Results are for the presumptive identification of SARS-CoV-2 RNA  Positive results are indicative of infection with SARS-CoV-2, the virus  causing COVID-19, but do not rule out bacterial infection or co-infection  with other viruses  Laboratories within the [place] and its  territories are required to report all positive results to the appropriate  public health authorities  Negative results do not preclude SARS-CoV-2  infection and should not be used as the sole basis for treatment or other  patient management decisions  Negative results must be combined with  clinical observations, patient history, and epidemiological information  This test has not been FDA cleared or approved  This test has been authorized by FDA under an Emergency Use Authorization  (EUA)  This test is only authorized for the duration of time the  declaration that circumstances exist justifying the authorization of the  emergency use of an in vitro diagnostic tests for detection of SARS-CoV-2  virus and/or diagnosis of COVID-19 infection under section 564(b)(1) of  the Act, 21 U  S C  717UYQ-5(G)(6), unless the authorization is terminated  or revoked sooner   The test has been validated but independent review by FDA  and CLIA is pending  Test performed using [employer] Gene[employer]pert: This RT-PCR assay targets N2,  a region unique to SARS-CoV-2  A conserved region in the E-gene was chosen  for pan-Sarbecovirus detection which includes SARS-CoV-2  According to CMS-2020-01-R, this platform meets the definition of high-throughput technology  Lactic acid 2 Hours [[id number]]  (Normal) Collected: 01/25/23 2143    Lab Status: Final result Specimen: Blood from Arm, Right Updated: 01/25/23 2208     LACTIC ACID 0 9 mmol/L     Narrative:      Result may be elevated if tourniquet was used during collection  Lactic acid [[id number]]  (Abnormal) Collected: 01/25/23 1947    Lab Status: Final result Specimen: Blood from Arm, Right Updated: 01/25/23 2101     LACTIC ACID 2 2 mmol/L     Narrative:      Result may be elevated if tourniquet was used during collection      Comprehensive metabolic panel [[id number]]  (Abnormal) Collected: 01/25/23 1947    Lab Status: Final result Specimen: Blood from Arm, Right Updated: 01/25/23 2040     Sodium 138 mmol/L      Potassium 3 3 mmol/L      Chloride 101 mmol/L      CO2 27 mmol/L      ANION GAP 10 mmol/L      BUN 10 mg/dL      Creatinine 1 20 mg/dL      Glucose 120 mg/dL      Calcium 9 4 mg/dL      AST 23 U/L      ALT 34 U/L      Alkaline Phosphatase 118 U/L      Total Protein 8 0 g/dL      Albumin 4 0 g/dL      Total Bilirubin 0 59 mg/dL      eGFR 81 ml/min/1 73sq m     Narrative:      [facility] guidelines for Chronic Kidney Disease (CKD):   •  Stage 1 with normal or high GFR (GFR > 90 mL/min/1 73 square meters)  •  Stage 2 Mild CKD (GFR = 60-89 mL/min/1 73 square meters)  •  Stage 3A Moderate CKD (GFR = 45-59 mL/min/1 73 square meters)  •  Stage 3B Moderate CKD (GFR = 30-44 mL/min/1 73 square meters)  •  Stage 4 Severe CKD (GFR = 15-29 mL/min/1 73 square meters)  •  Stage 5 End Stage CKD (GFR <15 mL/min/1 73 square meters)  Note: GFR calculation is accurate only with a steady state creatinine    Lipase [[id number]]  (Normal) Collected: 01/25/23 1947    Lab Status: Final result Specimen: Blood from Arm, Right Updated: 01/25/23 2040     Lipase 83 u/L     CBC and differential [[id number]]  (Abnormal) Collected: 01/25/23 1947    Lab Status: Final result Specimen: Blood from Arm, Right Updated: 01/25/23 2039     WBC 17 99 Thousand/uL      RBC 6 00 Million/uL      Hemoglobin 11 5 g/dL      Hematocrit 37 4 %      MCV 62 fL      MCH 19 2 pg      MCHC 30 7 g/dL      RDW 17 4 %      MPV 8 1 fL      Platelets 282 Thousands/uL     Narrative: This is an appended report  These results have been appended to a previously verified report  Manual Differential(PHLEBS Do Not Order) [[id number]]  (Abnormal) Collected: 01/25/23 1947    Lab Status: Final result Specimen: Blood from Arm, Right Updated: 01/25/23 2039     Segmented % 86 %      Bands % 7 %      Lymphocytes % 3 %      Monocytes % 2 %      Eosinophils, % 0 %      Basophils % 2 %      Absolute Neutrophils 16 73 Thousand/uL      Lymphocytes Absolute 0 54 Thousand/uL      Monocytes Absolute 0 36 Thousand/uL      Eosinophils Absolute 0 00 Thousand/uL      Basophils Absolute 0 36 Thousand/uL      Total Counted --     RBC Morphology Present     Basophilic Stippling Present     Hypochromia Present     Microcytes Present     Target Cells Present     Platelet Estimate Increased    Protime-INR [[id number]]  (Normal) Collected: 01/25/23 1947    Lab Status: Final result Specimen: Blood from Arm, Right Updated: 01/25/23 2022     Protime 13 4 seconds      INR 1 01    APTT [[id number]]  (Normal) Collected: 01/25/23 1947    Lab Status: Final result Specimen: Blood from Arm, Right Updated: 01/25/23 2022     PTT 30 seconds                  CT abdomen pelvis with contrast   Final Result by Salvatore Phan MD (01/25 2159)   New bilateral lower lobe airspace disease, left greater than right consistent with pneumonia  Stable postsurgical changes status post colectomy with right ileostomy  No obstruction  Mildly increased mucosal thickening of the J-pouch with new minimal ascites  Study marked in Epic for notification  Workstation performed: [id number]         XR chest portable - 1 view    (Results Pending)              Procedures  Procedures         ED Course                                             Medical Decision Making  Abdominal pathology as well as lower lung pathology due to his pain and fevers  Patient had a recent hospitalization as well as anesthesia for upper GI    Pneumonia: acute illness or injury  Amount and/or Complexity of Data Reviewed  Independent Historian: parent  External Data Reviewed: labs and radiology  Labs: ordered  Decision-making details documented in ED Course  Radiology: ordered  Decision-making details documented in ED Course  Discussion of management or test interpretation with external provider(s): CT scans and labs were reviewed by myself and the patient    Risk  Prescription drug management  Decision regarding hospitalization  Critical Care  Total time providing critical care: 30-74 minutes      Disposition  Final diagnoses:   Pneumonia     Time reflects when diagnosis was documented in both MDM as applicable and the Disposition within this note     Time User Action Codes Description Comment    1/26/2023  1:14 AM Emmanuelle Figueroa Add [J18 9] Pneumonia       ED Disposition     ED Disposition   Admit    Condition   Stable    Date/Time   u Jan 26, 2023  1:14 AM    Comment   Case was discussed with Dr Norris Overall and the patient's admission status was agreed to be Admission Status: inpatient status to the service of Dr Norris Overall   Follow-up Information    None         Patient's Medications   Discharge Prescriptions    No medications on file       No discharge procedures on file      PDMP Review       Value Time User    PDMP Reviewed  Yes 1/24/2023  2:23 PM Ranjan Mitchell Cristóbal Shaw, [address] [facility]          ED Provider  Electronically Signed by           Alyssa Powers DO  01/26/23 0117

## 2023-01-26 NOTE — ASSESSMENT & PLAN NOTE
- seen as bilateral lower lobe involvement   - CT chest with contrast today to evaluate further   - will need HCAP coverage as dcd 2 days ago from hospital   - no SOB or chest pain or respiratory distress   - productive cough with green sputum   - IVF, septic work up and sputum studies and  Abx and ID consult

## 2023-01-26 NOTE — CONSULTS
Consultation - Pulmonary Medicine   Luis Almaraz 32 y o  male MRN: [id number]  Unit/Bed#: [address] [place] [address] Encounter: [id number]      Assessment/Plan:    Abnormal CT chest with bibasilar pneumonia and sepsis present on admission   Possible aspiration in the setting of recent intractable vomiting  Patient was started on Unasyn due to allergies  Continue with same  Monitor temperatures, WBCs, and follow-up procalcitonin level  Check sputum culture and urinary antigens  Repeat imaging in 6 to 8 weeks  Intractable cough due to above   Increase Tessalon to 200 mg 3 times daily  Add Mucinex to help with expectoration  Can consider adding Phenergan for cough if Tessalon is ineffective, which will also help with nausea  Ulcerative colitis with continued nausea and abdominal pain   Status post ileostomy last year  Follows with [facility]  GI consult is pending  Discussed with primary team and gastroenterology  Further plans pending course  History of Present Illness   Physician Requesting Consult: Staci Nielsen DO  Reason for Consult / Principal Problem: Pneumonia  Hx and PE limited by: None  Chief Complaint: "I had the chills and felt hot and was sweating "  HPI: Luis Almaraz is a 32 y o   male who presented to [address] [place] [address] with complaints of left upper abdominal pain, fever, and coughing  He reports that he was just recently hospitalized after nausea and vomiting  At the end of his hospital stay, he noticed a new onset cough, but did not think anything of it  He was at home for 2 days and yesterday, after coming home from work, had chills, sensation of fever, and sweating  He had some left upper quadrant pain that wraps around into his side  He did have tenderness in this area as well  He came to the ER for further evaluation  He was found to have a fever of 102  He had noted some green mucus with his cough  No shortness of breath    He remains with some nausea  He denies any dysphagia  He did not note any gross aspiration episodes  Aside from the above, no other complaints  He has never had any lung problems in the past   He does not use any inhalers at home  Inpatient consult to Pulmonology  Consult performed by: [place] Shankar  Consult ordered by: Vitaliy Peter MD        Review of Systems   All other systems reviewed and are negative  A full 12-point review of systems was completed and is negative except for those outlined in the HPI  Historical Information   Past Medical History:   Diagnosis Date   • Ankylosing spondylitis ([facility] )    • Anxiety    • Bowel obstruction ([place] [address] )    • Clostridium difficile colitis 9/13/2018   • Colitis    • 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[place] [address] ) 9/13/2018   • Pancreatitis    • Ulcerative colitis ([place] [address] )      Past Surgical History:   Procedure Laterality Date   • COLECTOMY TOTAL      with ileal pouch and anastemosis   • IR PICC PLACEMENT SINGLE LUMEN  3/1/2022   • TOTAL COLECTOMY       Social History   Social History     Substance and Sexual Activity   Alcohol Use Not Currently    Comment: pt states 5 a month/socially     Social History     Substance and Sexual Activity   Drug Use No     Social History     Tobacco Use   Smoking Status Never   Smokeless Tobacco Never     E-Cigarette/Vaping   • E-Cigarette Use Never User      E-Cigarette/Vaping Substances   • Nicotine No    • THC No    • CBD No    • Flavoring No    • Other No    • Unknown No      Occupational History: Works as a   Family History: History reviewed  No pertinent family history      Meds/Allergies   all current active meds have been reviewed, pertinent pulmonary meds have been reviewed, current meds:   Current Facility-Administered Medications   Medication Dose Route Frequency   • acetaminophen (TYLENOL) tablet 650 mg  650 mg Oral Q6H PRN   • aluminum-magnesium hydroxide-simethicone (MYLANTA) oral suspension 30 mL  30 mL Oral Q6H PRN   • ampicillin-sulbactam (UNASYN) 3 g in sodium chloride 0 9 % 100 mL IVPB  3 g Intravenous Q6H   • benzonatate (TESSALON PERLES) capsule 100 mg  100 mg Oral TID   • enoxaparin (LOVENOX) subcutaneous injection 40 mg  40 mg Subcutaneous Daily   • HYDROmorphone (DILAUDID) injection 0 5 mg  0 5 mg Intravenous Q3H PRN   • magnesium hydroxide (MILK OF MAGNESIA) oral suspension 30 mL  30 mL Oral Daily PRN   • ondansetron (ZOFRAN) injection 4 mg  4 mg Intravenous Q6H PRN   • prochlorperazine (COMPAZINE) tablet 5 mg  5 mg Oral Q6H PRN    and PTA meds:   Prior to Admission Medications   Prescriptions Last Dose Informant Patient Reported? Taking? DULoxetine (CYMBALTA) 60 mg delayed release capsule 1/25/2023  No Yes   Sig: Take 1 capsule (60 mg total) by mouth daily   HYDROmorphone (DILAUDID) 2 mg tablet Past Week  No Yes   Sig: Take 1 tablet (2 mg total) by mouth every 8 (eight) hours as needed for severe pain for up to 12 doses Max Daily Amount: 6 mg   acetaminophen (TYLENOL) 325 mg tablet 1/25/2023  No Yes   Sig: Take 2 tablets (650 mg total) by mouth every 6 (six) hours as needed for mild pain, headaches or fever   cholestyramine sugar free (QUESTRAN LIGHT) 4 g packet 1/25/2023  No Yes   Sig: Take 2 packets (8 g total) by mouth 2 (two) times a day   ferrous sulfate 324 (65 Fe) mg Not Taking  No No   Sig: Take 1 tablet (324 mg total) by mouth daily before breakfast   Patient not taking: Reported on 1/26/2023   naloxone (NARCAN) 4 mg/0 1 mL nasal spray Not Taking  No No   Sig: Administer 1 spray into a nostril  If no response after 2-3 minutes, give another dose in the other nostril using a new spray     Patient not taking: Reported on 1/26/2023   ondansetron (ZOFRAN-ODT) 4 mg disintegrating tablet   No No   Sig: Take 1 tablet (4 mg total) by mouth every 6 (six) hours as needed for nausea for up to 3 days   pantoprazole (PROTONIX) 40 mg tablet 1/25/2023  No Yes   Sig: Take 1 tablet (40 mg total) by mouth daily      Facility-Administered Medications: None       Allergies   Allergen Reactions   • Cefazolin Hives     Other reaction(s): Arrythmia (Abnormal Heartbeat), Rash Maculopapular   • Mesalamine GI Intolerance     Other reaction(s): Pancreatitis  [facility] - [id number]: pancreatitis   • Wound Dressings Rash     Coloplast ostomy Products        Objective   Vitals: Blood pressure 108/63, pulse (!) 107, temperature 99 °F (37 2 °C), temperature source Oral, resp  rate 18, height 5' 9" (1 753 m), weight 82 kg (180 lb 12 4 oz), SpO2 94 %  Room air,Body mass index is 26 7 kg/m²  Intake/Output Summary (Last 24 hours) at 1/26/2023 1320  Last data filed at 1/26/2023 1150  Gross per 24 hour   Intake 1100 ml   Output 825 ml   Net 275 ml     Invasive Devices     Peripheral Intravenous Line  Duration           Peripheral IV 01/25/23 Right Antecubital <1 day          Drain  Duration           Ileostomy Continent (Abdominal pouch) RLQ 66 days                Physical Exam  Vitals reviewed  Constitutional:       General: He is not in acute distress  Appearance: He is well-developed  He is not toxic-appearing or diaphoretic  HENT:      Head: Normocephalic and atraumatic  Eyes:      General: No scleral icterus  Neck:      Trachea: No tracheal deviation  Cardiovascular:      Rate and Rhythm: Normal rate and regular rhythm  Heart sounds: S1 normal and S2 normal  No murmur heard  No friction rub  No gallop  Pulmonary:      Effort: Pulmonary effort is normal  No tachypnea, accessory muscle usage or respiratory distress  Breath sounds: No stridor  Examination of the right-lower field reveals decreased breath sounds  Examination of the left-lower field reveals decreased breath sounds  Decreased breath sounds present  No wheezing, rhonchi or rales  Chest:      Chest wall: No tenderness  Abdominal:      General: Bowel sounds are normal  There is no distension  Palpations: Abdomen is soft  Tenderness:  There is abdominal tenderness  Musculoskeletal:         General: No tenderness  Cervical back: Neck supple  Right lower leg: No edema  Left lower leg: No edema  Skin:     General: Skin is warm and dry  Findings: No rash  Neurological:      Mental Status: He is alert and oriented to person, place, and time  GCS: GCS eye subscore is 4  GCS verbal subscore is 5  GCS motor subscore is 6  Psychiatric:         Speech: Speech normal          Behavior: Behavior normal  Behavior is cooperative  Lab Results:   CBC:   Lab Results   Component Value Date    WBC 28 08 (H) 01/26/2023    HGB 9 9 (L) 01/26/2023    HCT 32 9 (L) 01/26/2023    MCV 63 (L) 01/26/2023     (H) 01/26/2023    MCH 18 9 (L) 01/26/2023    MCHC 30 1 (L) 01/26/2023    RDW 16 2 (H) 01/26/2023    MPV 8 3 (L) 01/26/2023    NRBC 0 01/26/2023   , CMP:   Lab Results   Component Value Date    SODIUM 136 01/26/2023    K 3 8 01/26/2023     01/26/2023    CO2 28 01/26/2023    BUN 8 01/26/2023    CREATININE 1 14 01/26/2023    CALCIUM 9 0 01/26/2023    AST 18 01/26/2023    ALT 25 01/26/2023    ALKPHOS 94 01/26/2023    EGFR 86 01/26/2023     Flu/COVID/RSV PCR: Negative    Culture Data: Blood cultures x2 pending    Imaging Studies: I have personally reviewed pertinent reports  and I have personally reviewed pertinent films in PACS   CT abdomen pelvis done yesterday shows bilateral lower lobe airspace disease left greater than right with stable postsurgical changes status post colectomy with right ileostomy and no obstruction  There is mildly increased mucosal thickening of the J-pouch with new minimal ascites  Chest x-ray done yesterday was unremarkable  CT of the chest with contrast today shows bilateral lower lobe pneumonia more extensive on the left with bilateral lower lobe bronchial wall thickening and endobronchial debris  There is trace right pleural effusion      EKG, Pathology, and Other Studies: I have personally reviewed pertinent reports  Echocardiogram pending  Pulmonary Results (PFTs, PSG): None    VTE Prophylaxis: Sequential compression device (Venodyne)  and Enoxaparin (Lovenox)    Code Status: Level 1 - Full Code    None    Portions of the record may have been created with voice recognition software  Occasional wrong word or "sound a like" substitutions may have occurred due to the inherent limitations of voice recognition software  Read the chart carefully and recognize, using context, where substitutions have occurred

## 2023-01-26 NOTE — ASSESSMENT & PLAN NOTE
Managed by [facility]  Patient reports changing ostomy bag every 3 to 4 days  Patient is requesting GI consult

## 2023-01-27 PROBLEM — A41.9 SEPSIS (HCC): Status: ACTIVE | Noted: 2023-01-26

## 2023-01-27 LAB
ANION GAP SERPL CALCULATED.3IONS-SCNC: 10 MMOL/L (ref 4–13)
AORTIC ROOT: 2.8 CM
APICAL FOUR CHAMBER EJECTION FRACTION: 68 %
BACTERIA UR CULT: NORMAL
BACTERIA UR CULT: NORMAL
BUN SERPL-MCNC: 7 MG/DL (ref 5–25)
CALCIUM SERPL-MCNC: 8.7 MG/DL (ref 8.3–10.1)
CHLORIDE SERPL-SCNC: 103 MMOL/L (ref 96–108)
CO2 SERPL-SCNC: 27 MMOL/L (ref 21–32)
CREAT SERPL-MCNC: 1.01 MG/DL (ref 0.6–1.3)
E WAVE DECELERATION TIME: 164 MS
ERYTHROCYTE [DISTWIDTH] IN BLOOD BY AUTOMATED COUNT: 16.3 % (ref 11.6–15.1)
FRACTIONAL SHORTENING: 40 % (ref 28–44)
GFR SERPL CREATININE-BSD FRML MDRD: 100 ML/MIN/1.73SQ M
GLUCOSE SERPL-MCNC: 100 MG/DL (ref 65–140)
HAPTOGLOB SERPL-MCNC: 165 MG/DL (ref 17–317)
HCT VFR BLD AUTO: 32.4 % (ref 36.5–49.3)
HGB BLD-MCNC: 9.7 G/DL (ref 12–17)
INTERVENTRICULAR SEPTUM IN DIASTOLE (PARASTERNAL SHORT AXIS VIEW): 0.9 CM
INTERVENTRICULAR SEPTUM: 0.9 CM (ref 0.6–1.1)
LAAS-AP2: 18.1 CM2
LAAS-AP4: 17.5 CM2
LEFT ATRIUM SIZE: 3.7 CM
LEFT INTERNAL DIMENSION IN SYSTOLE: 2.9 CM (ref 2.1–4)
LEFT VENTRICULAR INTERNAL DIMENSION IN DIASTOLE: 4.8 CM (ref 3.5–6)
LEFT VENTRICULAR POSTERIOR WALL IN END DIASTOLE: 0.9 CM
LEFT VENTRICULAR STROKE VOLUME: 74 ML
LVSV (TEICH): 74 ML
MAGNESIUM SERPL-MCNC: 2.1 MG/DL (ref 1.6–2.6)
MCH RBC QN AUTO: 18.9 PG (ref 26.8–34.3)
MCHC RBC AUTO-ENTMCNC: 29.9 G/DL (ref 31.4–37.4)
MCV RBC AUTO: 63 FL (ref 82–98)
MV E'TISSUE VEL-SEP: 14 CM/S
MV PEAK A VEL: 0.54 M/S
MV PEAK E VEL: 107 CM/S
MV STENOSIS PRESSURE HALF TIME: 48 MS
MV VALVE AREA P 1/2 METHOD: 4.58 CM2
PLATELET # BLD AUTO: 444 THOUSANDS/UL (ref 149–390)
PMV BLD AUTO: 8.4 FL (ref 8.9–12.7)
POTASSIUM SERPL-SCNC: 3.4 MMOL/L (ref 3.5–5.3)
PROCALCITONIN SERPL-MCNC: 1.67 NG/ML
RBC # BLD AUTO: 5.12 MILLION/UL (ref 3.88–5.62)
RIGHT ATRIUM AREA SYSTOLE A4C: 12.8 CM2
RIGHT VENTRICLE ID DIMENSION: 3.8 CM
SL CV LEFT ATRIUM LENGTH A2C: 4.6 CM
SL CV LV EF: 60
SL CV PED ECHO LEFT VENTRICLE DIASTOLIC VOLUME (MOD BIPLANE) 2D: 108 ML
SL CV PED ECHO LEFT VENTRICLE SYSTOLIC VOLUME (MOD BIPLANE) 2D: 33 ML
SODIUM SERPL-SCNC: 140 MMOL/L (ref 135–147)
TRICUSPID ANNULAR PLANE SYSTOLIC EXCURSION: 1.9 CM
WBC # BLD AUTO: 15.53 THOUSAND/UL (ref 4.31–10.16)

## 2023-01-27 RX ORDER — SACCHAROMYCES BOULARDII 250 MG
250 CAPSULE ORAL 2 TIMES DAILY
Status: DISCONTINUED | OUTPATIENT
Start: 2023-01-27 | End: 2023-01-28 | Stop reason: HOSPADM

## 2023-01-27 RX ORDER — POTASSIUM CHLORIDE 20 MEQ/1
40 TABLET, EXTENDED RELEASE ORAL ONCE
Status: COMPLETED | OUTPATIENT
Start: 2023-01-27 | End: 2023-01-27

## 2023-01-27 RX ADMIN — GUAIFENESIN 1200 MG: 600 TABLET, EXTENDED RELEASE ORAL at 09:09

## 2023-01-27 RX ADMIN — SODIUM CHLORIDE 3 G: 9 INJECTION, SOLUTION INTRAVENOUS at 23:26

## 2023-01-27 RX ADMIN — CHOLESTYRAMINE 4 G: 4 POWDER, FOR SUSPENSION ORAL at 09:09

## 2023-01-27 RX ADMIN — PANTOPRAZOLE SODIUM 40 MG: 40 TABLET, DELAYED RELEASE ORAL at 05:19

## 2023-01-27 RX ADMIN — HYDROMORPHONE HYDROCHLORIDE 0.5 MG: 1 INJECTION, SOLUTION INTRAMUSCULAR; INTRAVENOUS; SUBCUTANEOUS at 05:09

## 2023-01-27 RX ADMIN — ENOXAPARIN SODIUM 40 MG: 40 INJECTION SUBCUTANEOUS at 09:09

## 2023-01-27 RX ADMIN — BENZONATATE 200 MG: 100 CAPSULE ORAL at 09:09

## 2023-01-27 RX ADMIN — BENZONATATE 200 MG: 100 CAPSULE ORAL at 15:26

## 2023-01-27 RX ADMIN — Medication 250 MG: at 20:45

## 2023-01-27 RX ADMIN — HYDROMORPHONE HYDROCHLORIDE 0.5 MG: 1 INJECTION, SOLUTION INTRAMUSCULAR; INTRAVENOUS; SUBCUTANEOUS at 21:51

## 2023-01-27 RX ADMIN — CHOLESTYRAMINE 4 G: 4 POWDER, FOR SUSPENSION ORAL at 17:53

## 2023-01-27 RX ADMIN — GUAIFENESIN 1200 MG: 600 TABLET, EXTENDED RELEASE ORAL at 20:51

## 2023-01-27 RX ADMIN — HYDROMORPHONE HYDROCHLORIDE 0.5 MG: 1 INJECTION, SOLUTION INTRAMUSCULAR; INTRAVENOUS; SUBCUTANEOUS at 09:07

## 2023-01-27 RX ADMIN — HYDROMORPHONE HYDROCHLORIDE 0.5 MG: 1 INJECTION, SOLUTION INTRAMUSCULAR; INTRAVENOUS; SUBCUTANEOUS at 12:21

## 2023-01-27 RX ADMIN — HYDROMORPHONE HYDROCHLORIDE 0.5 MG: 1 INJECTION, SOLUTION INTRAMUSCULAR; INTRAVENOUS; SUBCUTANEOUS at 00:42

## 2023-01-27 RX ADMIN — HYDROMORPHONE HYDROCHLORIDE 0.5 MG: 1 INJECTION, SOLUTION INTRAMUSCULAR; INTRAVENOUS; SUBCUTANEOUS at 18:40

## 2023-01-27 RX ADMIN — SODIUM CHLORIDE 3 G: 9 INJECTION, SOLUTION INTRAVENOUS at 17:53

## 2023-01-27 RX ADMIN — SODIUM CHLORIDE 3 G: 9 INJECTION, SOLUTION INTRAVENOUS at 05:24

## 2023-01-27 RX ADMIN — POTASSIUM CHLORIDE 40 MEQ: 1500 TABLET, EXTENDED RELEASE ORAL at 10:24

## 2023-01-27 RX ADMIN — HYDROMORPHONE HYDROCHLORIDE 0.5 MG: 1 INJECTION, SOLUTION INTRAMUSCULAR; INTRAVENOUS; SUBCUTANEOUS at 15:26

## 2023-01-27 RX ADMIN — BENZONATATE 200 MG: 100 CAPSULE ORAL at 20:52

## 2023-01-27 RX ADMIN — SODIUM CHLORIDE 3 G: 9 INJECTION, SOLUTION INTRAVENOUS at 10:24
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
## 2023-01-27 NOTE — UTILIZATION REVIEW
Initial Clinical Review    Admission: Date/Time/Statement:   Admission Orders (From admission, onward)     Ordered        01/26/23 0115  INPATIENT ADMISSION  Once                      Orders Placed This Encounter   Procedures   • INPATIENT ADMISSION     Standing Status:   Standing     Number of Occurrences:   1     Order Specific Question:   Level of Care     Answer:   Med Surg [16]     Order Specific Question:   Estimated length of stay     Answer:   More than 2 Midnights     Order Specific Question:   Certification     Answer:   I certify that inpatient services are medically necessary for this patient for a duration of greater than two midnights  See H&P and MD Progress Notes for additional information about the patient's course of treatment  ED Arrival Information     Expected   -    Arrival   1/25/2023 19:43    Acuity   Emergent            Means of arrival   Walk-In    Escorted by   Family Member    Service   Hospitalist    Admission type   Emergency            Arrival complaint   Fever           Chief Complaint   Patient presents with   • Abdominal Pain     Worsening abd pain X 1 day  Pt has extensive hx  D/C from [facility] yesterday  Fever of 102 upon arrival     • Fever - 9 weeks to 74 years       Initial Presentation:  32 yom to ER from home c/o worsening abd pain, cough  D/c yesterday, was scoped with ulcerations & bleeding found, started on Questran & Protonix  Hx ulcerative colitis and ileostomy managed in [facility] in [facility], chronic pain syndrome and generalized anxiety disorder  Presents febrile, tachycardic, tachypneic, ileostomy intact without issues  Admission work-up showing BLL pneumonia on imaging, leukocytosis, hypomagnesemia, elevated procalcitonin, lactic acid  Admitted to inpatient status for BLL pneumonia  Started on IVABT, cultures pending  Per GI:  history of colectomy, high output  Ileostomy, thalassemia minor ankylosing spondylitis, ulcerative colitis    Came into the emergency room with cough, found to have pneumonia, being treated  Denies any abdominal pain, ileostomy output is stable  No blood in there  Denies any nausea vomiting able to eat  GI issues seems to be stable, he will follow-up at [facility] for his annual ileostomy management and UC  Per pulm: Bilateral lower lobe pneumonia; possible aspiration in view of recent vomiting  Continue IV Unasyn  Sputum was collected this afternoon for culture, pending  Provided Acapella valve to help with mucus clearance  Date: 1/27/23   Day 2:   Tmax 100 6  IVABT in progress for Bilateral pneumonia  Sputum cultures +  lungs with diminished breath sounds, currently on RA  Persistent abd pain requiring multiple doses IV Dilaudid  Ostomy intact, abd soft, +BS       ED Triage Vitals   Temperature Pulse Respirations Blood Pressure SpO2   01/25/23 1944 01/25/23 2002 01/25/23 2002 01/25/23 2002 01/25/23 2002   (!) 102 2 °F (39 °C) (!) 132 22 124/56 95 %      Temp Source Heart Rate Source Patient Position - Orthostatic VS BP Location FiO2 (%)   01/25/23 1944 01/25/23 2002 01/25/23 2002 01/25/23 2002 --   Oral Monitor Lying Left arm       Pain Score       01/25/23 2000       10 - Worst Possible Pain          Wt Readings from Last 1 Encounters:   01/26/23 82 kg (180 lb 12 4 oz)     Additional Vital Signs:   01/27/23 07:51:23 96 7 °F (35 9 °C) Abnormal  82 21 104/55 71 97 % -- --   01/27/23 05:15:06 97 3 °F (36 3 °C) Abnormal  75 18 -- -- 98 % -- --   01/26/23 22:45:29 99 °F (37 2 °C) 88 18 107/66 80 93 % None (Room air) Lying   01/26/23 19:35:10 100 6 °F (38 1 °C) Abnormal  100 18 109/66 80 96 % None (Room air) --   01/26/23 1500 99 9 °F (37 7 °C) 90 -- 109/64 79 99 % -- --   01/26/23 14:59:38 99 9 °F (37 7 °C) 88 18 109/64 79 99 % -- --   01/26/23 1100 -- 107 Abnormal  18 -- -- 94 % None (Room air) --   01/26/23 0751 99 °F (37 2 °C) 96 14 108/63 78 94 % None (Room air) Lying   01/26/23 07:50:53 99 °F (37 2 °C) 96 -- 108/63 78 94 % -- -- 01/26/23 01:48:59 99 °F (37 2 °C) 100 -- 109/63 78 95 % -- --   01/26/23 0100 -- 97 13 106/58 75 98 % -- --   01/26/23 0030 -- 103 18 110/67 82 97 % None (Room air) Lying   01/26/23 0000 -- 109 Abnormal  20 106/63 78 93 % -- --     Pertinent Labs/Diagnostic Test Results:   CT chest w contrast   Final Result (01/26 1152)      Bilateral lower lobe pneumonia, more extensive on the left, with no cavitation  Bilateral lower lobe bronchial wall thickening and endobronchial debris due to bronchitis  Trace right effusion  Mild sclerosis in the right manubrium, stable since March 2022, new since January 2020, presumably benign but of uncertain etiology  CT abdomen pelvis with contrast   Final Result  (01/25 2159)   New bilateral lower lobe airspace disease, left greater than right consistent with pneumonia  Stable postsurgical changes status post colectomy with right ileostomy  No obstruction  Mildly increased mucosal thickening of the J-pouch with new minimal ascites  XR chest portable - 1 view   Final Result  (01/26 0755)      No acute cardiopulmonary disease       Results from last 7 days   Lab Units 01/25/23 2129   SARS-COV-2  Negative     Results from last 7 days   Lab Units 01/27/23 0520 01/26/23 0416 01/25/23 1947 01/24/23 1317 01/21/23  0759   WBC Thousand/uL 15 53* 28 08* 17 99*  --  6 02   HEMOGLOBIN g/dL 9 7* 9 9* 11 5* 10 5* 9 0*   HEMATOCRIT % 32 4* 32 9* 37 4 34 9* 30 1*   PLATELETS Thousands/uL 444* 455* 528*  --  244   NEUTROS ABS Thousands/µL  --  22 91*  --   --  3 79   BANDS PCT %  --   --  7  --   --      Results from last 7 days   Lab Units 01/27/23 0520 01/26/23 0417 01/26/23 0416 01/25/23 1947 01/24/23 1317 01/21/23  0759   SODIUM mmol/L 140  --  136 138 140 134*   POTASSIUM mmol/L 3 4*  --  3 8 3 3* 3 2* 3 3*   CHLORIDE mmol/L 103  --  100 101 103 102   CO2 mmol/L 27  --  28 27 30 26   ANION GAP mmol/L 10  --  8 10 7 6   BUN mg/dL 7  --  8 10 3* 3*   CREATININE mg/dL 1 01  --  1 14 1 20 0 95 0 90   EGFR ml/min/1 73sq m 100  --  86 81 107 115   CALCIUM mg/dL 8 7  --  9 0 9 4 8 9 8 5   CALCIUM, IONIZED mmol/L  --  1 12  --   --   --   --    MAGNESIUM mg/dL 2 1  --  1 4*  --  1 6  --    PHOSPHORUS mg/dL  --   --  3 5  --   --   --      Results from last 7 days   Lab Units 01/26/23 0416 01/25/23 1947 01/21/23  0759   AST U/L 18 23 17   ALT U/L 25 34 28   ALK PHOS U/L 94 118* 88   TOTAL PROTEIN g/dL 6 7 8 0 5 9*   ALBUMIN g/dL 3 4* 4 0 2 9*   TOTAL BILIRUBIN mg/dL 0 80 0 59 0 39     Results from last 7 days   Lab Units 01/27/23  0520 01/26/23 0416 01/25/23 1947 01/24/23  1317 01/21/23  0759   GLUCOSE RANDOM mg/dL 100 88 120 121 92     Results from last 7 days   Lab Units 01/26/23 0416 01/25/23 1947   PROTIME seconds 15 5* 13 4   INR  1 22* 1 01   PTT seconds 37 30     Results from last 7 days   Lab Units 01/27/23  0520   PROCALCITONIN ng/ml 1 67*     Results from last 7 days   Lab Units 01/26/23 0416 01/25/23  2143 01/25/23 1947   LACTIC ACID mmol/L 1 5 0 9 2 2*     Results from last 7 days   Lab Units 01/25/23 1947 01/21/23  0759   FERRITIN ng/mL 213 151       Results from last 7 days   Lab Units 01/26/23 0416 01/25/23  1947   LIPASE u/L 36* 83     Results from last 7 days   Lab Units 01/25/23 2234   CLARITY UA  Clear   COLOR UA  Light Yellow   SPEC GRAV UA  <=1 005   PH UA  6 5   GLUCOSE UA mg/dl Negative   KETONES UA mg/dl Trace*   BLOOD UA  Negative   PROTEIN UA mg/dl Negative   NITRITE UA  Negative   BILIRUBIN UA  Negative   UROBILINOGEN UA E U /dl 0 2   LEUKOCYTES UA  Negative     Results from last 7 days   Lab Units 01/25/23 2129   INFLUENZA A PCR  Negative   INFLUENZA B PCR  Negative   RSV PCR  Negative     Results from last 7 days   Lab Units 01/26/23  1454 01/26/23  0759 01/25/23  2234 01/25/23 2002 01/25/23  1947   BLOOD CULTURE   --   --   --  No Growth at 24 hrs  No Growth at 24 hrs     SPUTUM CULTURE  Culture too young- will reincubate  --   --   -- --    Sputum  GRAM STAIN RESULT  Rare Epithelial Cells*  1+ Polys*  1+ Gram positive cocci in clusters*  --   --   --   --    URINE CULTURE   --  No Growth <1000 cfu/mL No Growth <1000 cfu/mL  --   --      ED Treatment:   Medication Administration from 01/25/2023 1943 to 01/26/2023 0143       Date/Time Order Dose Route Action     01/25/2023 1959 EST ondansetron (ZOFRAN) injection 4 mg 4 mg Intravenous Given     01/25/2023 2000 EST HYDROmorphone (DILAUDID) injection 1 mg 1 mg Intravenous Given     01/25/2023 2000 EST sodium chloride 0 9 % bolus 1,000 mL 1,000 mL Intravenous New Bag     01/25/2023 2048 EST levofloxacin (LEVAQUIN) IVPB (premix in dextrose) 500 mg 100 mL 500 mg Intravenous New Bag     01/25/2023 2106 EST potassium chloride (K-DUR,KLOR-CON) CR tablet 40 mEq 40 mEq Oral Given     01/25/2023 2127 EST iohexol (OMNIPAQUE) 350 MG/ML injection (SINGLE-DOSE) 100 mL 100 mL Intravenous Given     01/25/2023 2154 EST HYDROmorphone (DILAUDID) injection 1 mg 1 mg Intravenous Given     01/26/2023 0026 EST HYDROmorphone (DILAUDID) injection 1 mg 1 mg Intravenous Given     01/26/2023 0026 EST ondansetron (ZOFRAN) injection 4 mg 4 mg Intravenous Given        Past Medical History:   Diagnosis Date   • Ankylosing spondylitis (HCC)    • Anxiety    • Bowel obstruction (HCC)    • Clostridium difficile colitis 9/13/2018   • Colitis    • 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[facility] [address] ) 9/13/2018   • Pancreatitis    • Ulcerative colitis ([facility] [address] )      Present on Admission:  • Abdominal pain  • Fever  • Pneumonia  • Chronic pain syndrome  • Anemia      Admitting Diagnosis: Pneumonia [J18 9]  Abdominal pain [R10 9]  Fever [R50 9]  Age/Sex: 32 y o  male  Admission Orders:  Cont pulse ox  O2 to keep sats>92%  Consult pulmonary  Scd/foot pumps  Consult GI    Scheduled Medications:  ampicillin-sulbactam, 3 g, Intravenous, Q6H  benzonatate, 200 mg, Oral, TID  cholestyramine sugar free, 4 g, Oral, BID  enoxaparin, 40 mg, Subcutaneous, Daily  guaiFENesin, 1,200 mg, Oral, Q12H [facility]  pantoprazole, 40 mg, Oral, Early Morning  potassium chloride (K-DUR,KLOR-CON) CR tablet 40 mEq, Once 1/27    PRN Meds:  acetaminophen, 650 mg, Oral, Q6H PRN  aluminum-magnesium hydroxide-simethicone, 30 mL, Oral, Q6H PRN  HYDROmorphone, 0 5 mg, Intravenous, Q3H PRN, 1/26 x6, 1/27 x4  magnesium hydroxide, 30 mL, Oral, Daily PRN  ondansetron, 4 mg, Intravenous, Q6H PRN  prochlorperazine, 5 mg, Oral, Q6H PRN    Network Utilization Review Department  ATTENTION: Please call with any questions or concerns to [phone] and carefully listen to the prompts so that you are directed to the right person  All voicemails are confidential   [facility] all requests for admission clinical reviews, approved or denied determinations and any other requests to dedicated fax number below belonging to the campus where the patient is receiving treatment   List of dedicated fax numbers for the Facilities:  [address] DENIALS (Administrative/Medical Necessity) [phone]   [address] (Maternity/NICU/Pediatrics) [phone]   [address] Ashtyn Perez [phone]   [facility] [address]  [phone]   [address] [address] [address] [phone]   [address] [place] [address] [facility] [phone]

## 2023-01-27 NOTE — PLAN OF CARE
Problem: PAIN - ADULT  Goal: Verbalizes/displays adequate comfort level or baseline comfort level  Description: Interventions:  - Encourage patient to monitor pain and request assistance  - Assess pain using appropriate pain scale  - Administer analgesics based on type and severity of pain and evaluate response  - Implement non-pharmacological measures as appropriate and evaluate response  - Consider cultural and social influences on pain and pain management  - Notify physician/advanced practitioner if interventions unsuccessful or patient reports new pain  Outcome: Progressing     Problem: INFECTION - ADULT  Goal: Absence or prevention of progression during hospitalization  Description: INTERVENTIONS:  - Assess and monitor for signs and symptoms of infection  - Monitor lab/diagnostic results  - Monitor all insertion sites, i e  indwelling lines, tubes, and drains  - Monitor endotracheal if appropriate and nasal secretions for changes in amount and color  - Ethel appropriate cooling/warming therapies per order  - Administer medications as ordered  - Instruct and encourage patient and family to use good hand hygiene technique  - Identify and instruct in appropriate isolation precautions for identified infection/condition  Outcome: Progressing  Goal: Absence of fever/infection during neutropenic period  Description: INTERVENTIONS:  - Monitor WBC    Outcome: Progressing     Problem: RESPIRATORY - ADULT  Goal: Achieves optimal ventilation and oxygenation  Description: INTERVENTIONS:  - Assess for changes in respiratory status  - Assess for changes in mentation and behavior  - Position to facilitate oxygenation and minimize respiratory effort  - Oxygen administered by appropriate delivery if ordered  - Initiate smoking cessation education as indicated  - Encourage broncho-pulmonary hygiene including cough, deep breathe, Incentive Spirometry  - Assess the need for suctioning and aspirate as needed  - Assess and instruct to report SOB or any respiratory difficulty  - Respiratory Therapy support as indicated  Outcome: Progressing     Problem: GASTROINTESTINAL - ADULT  Goal: Minimal or absence of nausea and/or vomiting  Description: INTERVENTIONS:  - Administer IV fluids if ordered to ensure adequate hydration  - Maintain NPO status until nausea and vomiting are resolved  - Nasogastric tube if ordered  - Administer ordered antiemetic medications as needed  - Provide nonpharmacologic comfort measures as appropriate  - Advance diet as tolerated, if ordered  - Consider nutrition services referral to assist patient with adequate nutrition and appropriate food choices  Outcome: Progressing  Goal: Maintains or returns to baseline bowel function  Description: INTERVENTIONS:  - Assess bowel function  - Encourage oral fluids to ensure adequate hydration  - Administer IV fluids if ordered to ensure adequate hydration  - Administer ordered medications as needed  - Encourage mobilization and activity  - Consider nutritional services referral to assist patient with adequate nutrition and appropriate food choices  Outcome: Progressing  Goal: Maintains adequate nutritional intake  Description: INTERVENTIONS:  - Monitor percentage of each meal consumed  - Identify factors contributing to decreased intake, treat as appropriate  - Assist with meals as needed  - Monitor I&O, weight, and lab values if indicated  - Obtain nutrition services referral as needed  Outcome: Progressing  Goal: Establish and maintain optimal ostomy function  Description: INTERVENTIONS:  - Assess bowel function  - Encourage oral fluids to ensure adequate hydration  - Administer IV fluids if ordered to ensure adequate hydration   - Administer ordered medications as needed  - Encourage mobilization and activity  - Nutrition services referral to assist patient with appropriate food choices  - Assess stoma site  - Consider wound care consult   Outcome: Progressing  Goal: Oral mucous membranes remain intact  Description: INTERVENTIONS  - Assess oral mucosa and hygiene practices  - Implement preventative oral hygiene regimen  - Implement oral medicated treatments as ordered  - Initiate Nutrition services referral as needed  Outcome: Progressing     Problem: SKIN/TISSUE INTEGRITY - ADULT  Goal: Skin Integrity remains intact(Skin Breakdown Prevention)  Description: Assess:  -Perform Ervin assessment every shift  -Clean and moisturize skin every shift   -Inspect skin when repositioning, toileting, and assisting with ADLS  -Assess under medical devices such as maxime every 4h   -Assess extremities for adequate circulation and sensation     Bed Management:  -Have minimal linens on bed & keep smooth, unwrinkled  -Change linens as needed when moist or perspiring  -Avoid sitting or lying in one position for more than 2 hours while in bed  -Keep HOB at 30 degrees     Toileting:  -Offer bedside commode  -Assess for incontinence every 2h  -Use incontinent care products after each incontinent episode such as foam cleanser     Activity:  -Mobilize patient 3 times a day  -Encourage activity and walks on unit  -Encourage or provide ROM exercises   -Turn and reposition patient every 2 Hours  -Use appropriate equipment to lift or move patient in bed  -Instruct/ Assist with weight shifting every 2h when out of bed in chair  -Consider limitation of chair time 4 hour intervals    Skin Care:  -Avoid use of baby powder, tape, friction and shearing, hot water or constrictive clothing  -Relieve pressure over bony prominences using waffle cushion   -Do not massage red bony areas      Outcome: Progressing  Goal: Incision(s), wounds(s) or drain site(s) healing without S/S of infection  Description: INTERVENTIONS  - Assess and document dressing, incision, wound bed, drain sites and surrounding tissue  - Provide patient and family education  - Perform skin care/dressing changes every shift   Outcome: Progressing     Problem: HEMATOLOGIC - ADULT  Goal: Maintains hematologic stability  Description: INTERVENTIONS  - Assess for signs and symptoms of bleeding or hemorrhage  - Monitor labs  - Administer supportive blood products/factors as ordered and appropriate  Outcome: Progressing

## 2023-01-27 NOTE — PROGRESS NOTES
Progress Note - Pulmonary   Mason Desir 32 y o  male MRN: [id number]  Unit/Bed#: [address] [place] [address] Encounter: [id number]    Assessment/Plan:    Abnormal CT chest with bibasilar pneumonia and sepsis present on admission              Possible aspiration in the setting of recent intractable vomiting  Continue Unasyn and follow-up sputum culture  Monitor temperatures, WBCs, and follow-up procalcitonin levels  Continue flutter valve for pulmonary hygiene  Repeat imaging in 6 to 8 weeks      Intractable cough due to above   Continue Tessalon  Continue Mucinex      Ulcerative colitis with continued nausea and abdominal pain              Status post ileostomy last year  Follows with [facility]  GI consult noted  Chief Complaint:    "I feel little better "    Subjective:    Barbi Young is sitting up in bed  He reports he feels a little bit better  He still has some pleuritic and left upper quadrant pain  No shortness of breath  Continues to cough up green mucus  Cough regimen has helped  Objective:    Vitals: Blood pressure 104/55, pulse 82, temperature (!) 96 7 °F (35 9 °C), resp  rate 21, height 5' 9" (1 753 m), weight 82 kg (180 lb 12 4 oz), SpO2 97 %  Room air,Body mass index is 26 7 kg/m²  Intake/Output Summary (Last 24 hours) at 1/27/2023 1510  Last data filed at 1/26/2023 2235  Gross per 24 hour   Intake 370 ml   Output 300 ml   Net 70 ml       Invasive Devices     Peripheral Intravenous Line  Duration           Peripheral IV 01/25/23 Right Antecubital 1 day          Drain  Duration           Ileostomy Continent (Abdominal pouch) RLQ 67 days                Physical Exam:     Physical Exam  Vitals reviewed  Constitutional:       General: He is not in acute distress  Appearance: He is well-developed  He is not toxic-appearing or diaphoretic  HENT:      Head: Normocephalic and atraumatic     Eyes:      General: No scleral icterus  Neck:      Trachea: No tracheal deviation  Cardiovascular:      Rate and Rhythm: Normal rate and regular rhythm  Heart sounds: S1 normal and S2 normal  No murmur heard  No friction rub  No gallop  Pulmonary:      Effort: Pulmonary effort is normal  No tachypnea, accessory muscle usage or respiratory distress  Breath sounds: No stridor  Examination of the right-lower field reveals rales  Examination of the left-lower field reveals decreased breath sounds  Decreased breath sounds and rales present  No wheezing or rhonchi  Chest:      Chest wall: No tenderness  Abdominal:      General: Bowel sounds are normal  There is no distension  Palpations: Abdomen is soft  Comments: Ileostomy in place  Musculoskeletal:         General: No tenderness  Cervical back: Neck supple  Right lower leg: No edema  Left lower leg: No edema  Skin:     General: Skin is warm and dry  Findings: No rash  Neurological:      Mental Status: He is alert and oriented to person, place, and time  GCS: GCS eye subscore is 4  GCS verbal subscore is 5  GCS motor subscore is 6  Psychiatric:         Speech: Speech normal          Behavior: Behavior normal  Behavior is cooperative         Labs:   CBC:   Lab Results   Component Value Date    WBC 15 53 (H) 01/27/2023    HGB 9 7 (L) 01/27/2023    HCT 32 4 (L) 01/27/2023    MCV 63 (L) 01/27/2023     (H) 01/27/2023    MCH 18 9 (L) 01/27/2023    MCHC 29 9 (L) 01/27/2023    RDW 16 3 (H) 01/27/2023    MPV 8 4 (L) 01/27/2023   , CMP:   Lab Results   Component Value Date    SODIUM 140 01/27/2023    K 3 4 (L) 01/27/2023     01/27/2023    CO2 27 01/27/2023    BUN 7 01/27/2023    CREATININE 1 01 01/27/2023    CALCIUM 8 7 01/27/2023    EGFR 100 01/27/2023     Procalcitonin 1 67    Sputum culture pending    Imaging and other studies: None today

## 2023-01-27 NOTE — PROGRESS NOTES
Marta U  64   Progress Note - Merlinda Asters 1995, 32 y o  male MRN: [id number]  Unit/Bed#: [address] [place] [address] Encounter: [id number]  Primary Care Provider: Vern Goins DO   Date and time admitted to hospital: 1/25/2023  7:43 PM    * Sepsis [facility])  Assessment & Plan  78 F in ER with tachycardia   · Source likely bilateral pneumonia  Drip score is low  CT chest with contrast showed bilateral lower lobe pneumonia with mild sclerosis in the right manubrium which is stable  · Given allergy to cefazolin (possible arrhythmia), started on Unasyn  Patient has tolerated Zosyn in the past  · Blood cultures, urine culture negative so far  · Repeat labs in a m  procalcitonin elevated    Pneumonia  Assessment & Plan  biLateral lower lobe pneumonia on imaging  · On IV Unasyn as noted above    Abdominal pain  Assessment & Plan  Patient with chronic abdominal pain requiring multiple hospitalizations  - CT scan of abdomen pelvis with contrast reveals bilateral pneumonia - L > R   Appreciate GI input    Chronic pain syndrome  Assessment & Plan  Recommended outpatient pain management referral for management of chronic abd pain but patient declined during prior admission  • On Dilaudid here - 0 5 mg IV every 3 hours prn    Ileostomy present [facility])  Assessment & Plan  Managed by [facility]  Reports changing ostomy bag every 3 to 4 days    Anemia  Assessment & Plan  Hemoglobin stable  Labs in a m  Results from last 7 days   Lab Units 01/27/23  0520 01/26/23  0416 01/25/23  1947 01/24/23  1317 01/21/23  0759   HEMOGLOBIN g/dL 9 7* 9 9* 11 5* 10 5* 9 0*   HEMATOCRIT % 32 4* 32 9* 37 4 34 9* 30 1*   MCV fL 63* 63* 62*  --  63*       VTE Pharmacologic Prophylaxis: VTE Score: 3 Moderate Risk (Score 3-4) - Pharmacological DVT Prophylaxis Ordered: enoxaparin (Lovenox)  Patient Centered Rounds: I performed bedside rounds with nursing staff today    Discussions with Specialists or Other Care Team Provider: yes - pulmonary    Education and Discussions with Family / Patient: yes    Time Spent for Care: 35 min  More than 50% of total time spent on counseling and coordination of care as described above  Current Length of Stay: 1 day(s)  Current Patient Status: Inpatient   Certification Statement: The patient will continue to require additional inpatient hospital stay due to Sepsis due to pneumonia requiring IV antibiotics and repeat lab work  Discharge Plan: Anticipate discharge in 24-48 hrs to home  Code Status: Level 1 - Full Code    Subjective:     Patient reports less cough  Reports decreased p o  intake  Still with abdominal pain    Objective:     Vitals:   Temp (24hrs), Av 9 °F (37 2 °C), Min:96 7 °F (35 9 °C), Max:100 6 °F (38 1 °C)    Temp:  [96 7 °F (35 9 °C)-100 6 °F (38 1 °C)] 96 7 °F (35 9 °C)  HR:  [] 82  Resp:  [18-21] 21  BP: (104-109)/(55-66) 104/55  SpO2:  [93 %-99 %] 97 %  Body mass index is 26 7 kg/m²  Input and Output Summary (last 24 hours): Intake/Output Summary (Last 24 hours) at 2023 1338  Last data filed at 2023 2235  Gross per 24 hour   Intake 370 ml   Output 300 ml   Net 70 ml       Physical Exam:   Physical Exam  Vitals reviewed  Constitutional:       General: He is not in acute distress  Appearance: He is not ill-appearing  HENT:      Head: Normocephalic and atraumatic  Eyes:      General:         Right eye: No discharge  Left eye: No discharge  Cardiovascular:      Rate and Rhythm: Normal rate and regular rhythm  Pulmonary:      Effort: Pulmonary effort is normal  No respiratory distress  Breath sounds: No wheezing or rales  Comments: Decreased breath sounds bilaterally  Scattered rhonchi  Abdominal:      General: Bowel sounds are normal  There is no distension  Palpations: Abdomen is soft  Tenderness: There is no abdominal tenderness  Musculoskeletal:      Right lower leg: No edema  Left lower leg: No edema  Neurological:      Mental Status: He is alert and oriented to person, place, and time  Additional Data:     Labs:  Results from last 7 days   Lab Units 01/27/23  0520 01/26/23 0416 01/25/23 1947   WBC Thousand/uL 15 53* 28 08* 17 99*   HEMOGLOBIN g/dL 9 7* 9 9* 11 5*   HEMATOCRIT % 32 4* 32 9* 37 4   PLATELETS Thousands/uL 444* 455* 528*   BANDS PCT %  --   --  7   NEUTROS PCT %  --  82*  --    LYMPHS PCT %  --  7*  --    LYMPHO PCT %  --   --  3*   MONOS PCT %  --  9  --    MONO PCT %  --   --  2*   EOS PCT %  --  0 0     Results from last 7 days   Lab Units 01/27/23  0520 01/26/23  0416   SODIUM mmol/L 140 136   POTASSIUM mmol/L 3 4* 3 8   CHLORIDE mmol/L 103 100   CO2 mmol/L 27 28   BUN mg/dL 7 8   CREATININE mg/dL 1 01 1 14   ANION GAP mmol/L 10 8   CALCIUM mg/dL 8 7 9 0   ALBUMIN g/dL  --  3 4*   TOTAL BILIRUBIN mg/dL  --  0 80   ALK PHOS U/L  --  94   ALT U/L  --  25   AST U/L  --  18   GLUCOSE RANDOM mg/dL 100 88     Results from last 7 days   Lab Units 01/26/23  0416   INR  1 22*             Results from last 7 days   Lab Units 01/27/23  0520 01/26/23  0416 01/25/23 2143 01/25/23 1947   LACTIC ACID mmol/L  --  1 5 0 9 2 2*   PROCALCITONIN ng/ml 1 67*  --   --   --        Lines/Drains:  Invasive Devices     Peripheral Intravenous Line  Duration           Peripheral IV 01/25/23 Right Antecubital 1 day          Drain  Duration           Ileostomy Continent (Abdominal pouch) RLQ 67 days                      Imaging: Reviewed radiology reports from this admission including: chest CT scan    Recent Cultures (last 7 days):   Results from last 7 days   Lab Units 01/26/23  1454 01/26/23  0759 01/25/23  2234 01/25/23 2002 01/25/23 1947   BLOOD CULTURE   --   --   --  No Growth at 24 hrs  No Growth at 24 hrs     SPUTUM CULTURE  Culture too young- will reincubate  --   --   --   --    GRAM STAIN RESULT  Rare Epithelial Cells*  1+ Polys*  1+ Gram positive cocci in clusters*  --   --   --   --    URINE CULTURE   --  No Growth <1000 cfu/mL No Growth <1000 cfu/mL  --   --        Last 24 Hours Medication List:   Current Facility-Administered Medications   Medication Dose Route Frequency Provider Last Rate   • acetaminophen  650 mg Oral Q6H PRN Haley Abdi MD     • aluminum-magnesium hydroxide-simethicone  30 mL Oral Q6H PRN Haley Abdi MD     • ampicillin-sulbactam  3 g Intravenous Q6H Darlin Martinez DO 3 g (01/27/23 1024)   • benzonatate  200 mg Oral TID YK Cool     • cholestyramine sugar free  4 g Oral BID KY Hawkins     • enoxaparin  40 mg Subcutaneous Daily Haley Abdi MD     • guaiFENesin  1,200 mg Oral Q12H [facility] [place] Cool     • HYDROmorphone  0 5 mg Intravenous Q3H PRN Haley Abdi MD     • magnesium hydroxide  30 mL Oral Daily PRN Haley Abdi MD     • ondansetron  4 mg Intravenous Q6H PRN Haley Abdi MD     • pantoprazole  40 mg Oral Early Morning KY Hawkins     • prochlorperazine  5 mg Oral Q6H PRN Haley Abdi MD          Today, Patient Was Seen By: Laquita Benjamin DO    **Please Note: This note may have been constructed using a voice recognition system  **

## 2023-01-27 NOTE — PROGRESS NOTES
Progress Note- Erasmo Iglesias 32 y o  male MRN: [id number]    Unit/Bed#: [address] [facility] 415-01 Encounter: [id number]      Assessment and Plan: This is a 32year old with ankylosing spondylitis, thalassemia minor, UC s/p colectomy with J-pouch formation, pouchitis, s/p ileostomy 10/13/22 c/b proximal limb evisceration, purulent ascites & sepsis requiring revision/washout 10/18, and recent admission 1/18-1/24 for nausea/vomiting, abdominal pain, & high output ileostomy who presented 1/25 with cough, LUQ pain meeting sepsis criteria in the ED with new evidence of pneumonia L>R on CT  GI consulted due to LUQ pain          1  LUQ pain: LUQ pain may be secondary to underlying pneumonia, slowly improving  Pt was found to have erosive gastritis/peptic duodenitis seen on recent EGD 1/20 but burning epigastric pain he was having improved prior to discharge last admission  He denies any worsening of pain w/ oral intake, mostly worse with standing  Denies any nausea/vomiting & tolerating diet  Lipase WNL     -Avoid NSAIDs, continue home dose Pantoprazole 40 mg daily  -Continue low fiber/low residue diet as tolerated  -Continue Questran 4g BID for high output ileostomy last admission which has now stablilized  -Monitor ileostomy output  -Pulm following for treatment of PNA  -Continue follow up w/ [facility] on discharge    GI will follow as needed please call for any questions        ______________________________________________________________________    Subjective:     Pt sitting in bed  Cough improving  Per nursing requesting dilaudid q3 for LUQ pain which he states improves it  Pain isn't worse with eating and he is tolerating diet  Denies any nausea/vomiting or change in ileostomy output which is brown, no evidence black/bloody stool       Medication Administration - last 24 hours from 01/26/2023 1531 to 01/27/2023 1531       Date/Time Order Dose Route Action Action by     01/27/2023 0909 EST enoxaparin (LOVENOX) subcutaneous injection 40 mg 40 mg Subcutaneous Given Yanet Dickerson, RN     01/26/2023 2240 EST acetaminophen (TYLENOL) tablet 650 mg 650 mg Oral Given [facility], RN     01/27/2023 1526 EST HYDROmorphone (DILAUDID) injection 0 5 mg 0 5 mg Intravenous Given Yanet Dickerson, RN     01/27/2023 1221 EST HYDROmorphone (DILAUDID) injection 0 5 mg 0 5 mg Intravenous Given Yanet Dickerson, RN     01/27/2023 4280 EST HYDROmorphone (DILAUDID) injection 0 5 mg 0 5 mg Intravenous Given Yanet Crook, RN     01/27/2023 0509 EST HYDROmorphone (DILAUDID) injection 0 5 mg 0 5 mg Intravenous Given [facility], RN     01/27/2023 5429 EST HYDROmorphone (DILAUDID) injection 0 5 mg 0 5 mg Intravenous Given [facility], RN     01/26/2023 2059 EST HYDROmorphone (DILAUDID) injection 0 5 mg 0 5 mg Intravenous Given [facility], RN     01/26/2023 1758 EST HYDROmorphone (DILAUDID) injection 0 5 mg 0 5 mg Intravenous Given Shanda Sesay, ZACHARY     01/27/2023 1024 EST ampicillin-sulbactam (UNASYN) 3 g in sodium chloride 0 9 % 100 mL IVPB 3 g Intravenous [address] Yanet Dickerson, RN     01/27/2023 0524 EST ampicillin-sulbactam (UNASYN) 3 g in sodium chloride 0 9 % 100 mL IVPB 3 g Intravenous [address] [facility]     01/26/2023 2235 EST ampicillin-sulbactam (UNASYN) 3 g in sodium chloride 0 9 % 100 mL IVPB 3 g Intravenous [address] [facility]     01/26/2023 1807 EST ampicillin-sulbactam (UNASYN) 3 g in sodium chloride 0 9 % 100 mL IVPB 3 g Intravenous [address] Shanda Sesay, ZACHARY     01/27/2023 1526 EST benzonatate (TESSALON PERLES) capsule 200 mg 200 mg Oral Given Yanet Dickerson RN     01/27/2023 1453 EST benzonatate (TESSALON PERLES) capsule 200 mg 200 mg Oral Given Yanet Dickerson RN     01/26/2023 2057 EST benzonatate (TESSALON PERLES) capsule 200 mg 200 mg Oral Given Prerna Warren RN     01/26/2023 1759 EST benzonatate (TESSALON PERLES) capsule 200 mg 200 mg Oral Given Shanda Sesay RN     01/27/2023 6123 EST guaiFENesin (MUCINEX) 12 hr tablet 1,200 mg 1,200 mg Oral Given Denise Saavedra RN     01/26/2023 2057 EST guaiFENesin (MUCINEX) 12 hr tablet 1,200 mg 1,200 mg Oral Given Ruben Chandra RN     01/27/2023 8156 EST pantoprazole (PROTONIX) EC tablet 40 mg 40 mg Oral Given Ruben Chandra RN     01/27/2023 7956 EST cholestyramine sugar free (QUESTRAN LIGHT) packet 4 g 4 g Oral Given Denise Saavedra RN     01/26/2023 1759 EST cholestyramine sugar free (QUESTRAN LIGHT) packet 4 g 4 g Oral Given Mary Henry RN     01/26/2023 2100 EST magnesium sulfate 2 g/50 mL IVPB (premix) 2 g 2 g Intravenous [address] [facility], [address] [place] [address]     01/27/2023 1024 EST potassium chloride (K-DUR,KLOR-CON) CR tablet 40 mEq 40 mEq Oral Given Denise Saavedra RN          Objective:     Vitals: Blood pressure 106/65, pulse 86, temperature (!) 96 7 °F (35 9 °C), resp  rate 21, height 5' 9" (1 753 m), weight 82 kg (180 lb 12 4 oz), SpO2 (!) 89 %  ,Body mass index is 26 7 kg/m²        Intake/Output Summary (Last 24 hours) at 1/27/2023 1531  Last data filed at 1/26/2023 2235  Gross per 24 hour   Intake 370 ml   Output 300 ml   Net 70 ml       Physical Exam:   General Appearance: Awake and alert, in no acute distress  Chest: clear to auscultation, no rales or rhonchi  Cardiac: S1 & S2 normal, no murmur or gallop  Abdomen: Soft, non-tender, non-distended; bowel sounds normal; no masses or no organomegaly    Invasive Devices     Peripheral Intravenous Line  Duration           Peripheral IV 01/25/23 Right Antecubital 1 day          Drain  Duration           Ileostomy Continent (Abdominal pouch) RLQ 67 days                Lab Results:  Admission on 01/25/2023   Component Date Value   • WBC 01/25/2023 17 99 (H)    • RBC 01/25/2023 6 00 (H)    • Hemoglobin 01/25/2023 11 5 (L)    • Hematocrit 01/25/2023 37 4    • MCV 01/25/2023 62 (L)    • MCH 01/25/2023 19 2 (L)    • MCHC 01/25/2023 30 7 (L)    • RDW 01/25/2023 17 4 (H)    • MPV 01/25/2023 8 1 (L)    • Platelets 87/14/9311 528 (H)    • Protime 01/25/2023 13 4    • INR 01/25/2023 1 01    • PTT 01/25/2023 30    • Blood Culture 01/25/2023 No Growth at 24 hrs  • Blood Culture 01/25/2023 No Growth at 24 hrs      • Sodium 01/25/2023 138    • Potassium 01/25/2023 3 3 (L)    • Chloride 01/25/2023 101    • CO2 01/25/2023 27    • ANION GAP 01/25/2023 10    • BUN 01/25/2023 10    • Creatinine 01/25/2023 1 20    • Glucose 01/25/2023 120    • Calcium 01/25/2023 9 4    • AST 01/25/2023 23    • ALT 01/25/2023 34    • Alkaline Phosphatase 01/25/2023 118 (H)    • Total Protein 01/25/2023 8 0    • Albumin 01/25/2023 4 0    • Total Bilirubin 01/25/2023 0 59    • eGFR 01/25/2023 81    • Lipase 01/25/2023 83    • Color, UA 01/25/2023 Light Yellow    • Clarity, UA 01/25/2023 Clear    • Specific Gravity, UA 01/25/2023 <=1 005    • pH, UA 01/25/2023 6 5    • Leukocytes, UA 01/25/2023 Negative    • Nitrite, UA 01/25/2023 Negative    • Protein, UA 01/25/2023 Negative    • Glucose, UA 01/25/2023 Negative    • Ketones, UA 01/25/2023 Trace (A)    • Urobilinogen, UA 01/25/2023 0 2    • Bilirubin, UA 01/25/2023 Negative    • Occult Blood, UA 01/25/2023 Negative    • LACTIC ACID 01/25/2023 2 2 (HH)    • Urine Culture 01/25/2023 No Growth <1000 cfu/mL    • Segmented % 01/25/2023 86 (H)    • Bands % 01/25/2023 7    • Lymphocytes % 01/25/2023 3 (L)    • Monocytes % 01/25/2023 2 (L)    • Eosinophils, % 01/25/2023 0    • Basophils % 01/25/2023 2 (H)    • Absolute Neutrophils 01/25/2023 16 73 (H)    • Lymphocytes Absolute 01/25/2023 0 54 (L)    • Monocytes Absolute 01/25/2023 0 36    • Eosinophils Absolute 01/25/2023 0 00    • Basophils Absolute 01/25/2023 0 36 (H)    • RBC Morphology 01/25/2023 Present    • Basophilic Stippling 54/06/0279 Present    • Hypochromia 01/25/2023 Present    • Microcytes 01/25/2023 Present    • Target Cells 01/25/2023 Present    • Platelet Estimate 32/89/6274 Increased (A)    • LACTIC ACID 01/25/2023 0 9    • SARS-CoV-2 01/25/2023 Negative    • INFLUENZA A PCR 01/25/2023 Negative    • INFLUENZA B PCR 01/25/2023 Negative    • RSV PCR 01/25/2023 Negative    • Fecal Occult Blood Diagn* 01/26/2023 Negative    • Fecal Occult Blood Diagn* 01/26/2023 Negative    • Fecal Occult Blood Diagn* 01/26/2023 Negative    • Sputum Culture 01/26/2023 Culture too young- will reincubate    • Gram Stain Result 01/26/2023 Rare Epithelial Cells (A)    • Gram Stain Result 01/26/2023 1+ Polys (A)    • Gram Stain Result 01/26/2023 1+ Gram positive cocci in clusters (A)    • Urine Culture 01/26/2023 No Growth <1000 cfu/mL    • LA size 01/26/2023 3 7    • LVPWd 01/26/2023 0 90    • Left Atrium Area-systoli* 01/26/2023 18 1    • Left Atrium Area-systoli* 01/26/2023 17 5    • MV E' Tissue Velocity Se* 01/26/2023 14    • Tricuspid annular plane * 01/26/2023 1 90    • IVSd 01/26/2023 8 99    • LV DIASTOLIC VOLUME (MOD* 72/75/4912 108    • LEFT VENTRICLE SYSTOLIC * 50/73/8834 33    • Left ventricular stroke * 01/26/2023 74 00    • A4C EF 01/26/2023 68    • LA length (A2C) 01/26/2023 4 60    • LVIDd 01/26/2023 4 80    • IVS 01/26/2023 0 9    • LVIDS 01/26/2023 2 90    • FS 01/26/2023 40    • Ao root 01/26/2023 2 80    • RVID d 01/26/2023 3 8    • MV valve area p 1/2 meth* 01/26/2023 4 58    • E wave deceleration time 01/26/2023 164    • MV Peak E Yifan 01/26/2023 107    • MV Peak A Yifan 01/26/2023 0 54    • RAA A4C 01/26/2023 12 8    • MV stenosis pressure 1/2* 01/26/2023 48    • LVSV, 2D 01/26/2023 74    • Iron Saturation 01/25/2023 17 (L)    • TIBC 01/25/2023 324    • Iron 01/25/2023 55 (L)    • Ferritin 01/25/2023 213    • Haptoglobin 01/26/2023 165    • Sodium 01/26/2023 136    • Potassium 01/26/2023 3 8    • Chloride 01/26/2023 100    • CO2 01/26/2023 28    • ANION GAP 01/26/2023 8    • BUN 01/26/2023 8    • Creatinine 01/26/2023 1 14    • Glucose 01/26/2023 88    • Calcium 01/26/2023 9 0    • Corrected Calcium 01/26/2023 9 5    • AST 01/26/2023 18 • ALT 01/26/2023 25    • Alkaline Phosphatase 01/26/2023 94    • Total Protein 01/26/2023 6 7    • Albumin 01/26/2023 3 4 (L)    • Total Bilirubin 01/26/2023 0 80    • eGFR 01/26/2023 86    • PTT 01/26/2023 37    • Protime 01/26/2023 15 5 (H)    • INR 01/26/2023 1 22 (H)    • WBC 01/26/2023 28 08 (H)    • RBC 01/26/2023 5 23    • Hemoglobin 01/26/2023 9 9 (L)    • Hematocrit 01/26/2023 32 9 (L)    • MCV 01/26/2023 63 (L)    • MCH 01/26/2023 18 9 (L)    • MCHC 01/26/2023 30 1 (L)    • RDW 01/26/2023 16 2 (H)    • MPV 01/26/2023 8 3 (L)    • Platelets 14/03/5961 455 (H)    • nRBC 01/26/2023 0    • Neutrophils Relative 01/26/2023 82 (H)    • Immat GRANS % 01/26/2023 2    • Lymphocytes Relative 01/26/2023 7 (L)    • Monocytes Relative 01/26/2023 9    • Eosinophils Relative 01/26/2023 0    • Basophils Relative 01/26/2023 0    • Neutrophils Absolute 01/26/2023 22 91 (H)    • Immature Grans Absolute 01/26/2023 >0 50 (H)    • Lymphocytes Absolute 01/26/2023 1 83    • Monocytes Absolute 01/26/2023 2 57 (H)    • Eosinophils Absolute 01/26/2023 0 05    • Basophils Absolute 01/26/2023 0 12 (H)    • Phosphorus 01/26/2023 3 5    • Lipase 01/26/2023 36 (L)    • Magnesium 01/26/2023 1 4 (L)    • Calcium, Ionized 01/26/2023 1 12    • LACTIC ACID 01/26/2023 1 5    • DEBORA 01/26/2023 Negative    • Procalcitonin 01/27/2023 1 67 (H)    • Sodium 01/27/2023 140    • Potassium 01/27/2023 3 4 (L)    • Chloride 01/27/2023 103    • CO2 01/27/2023 27    • ANION GAP 01/27/2023 10    • BUN 01/27/2023 7    • Creatinine 01/27/2023 1 01    • Glucose 01/27/2023 100    • Calcium 01/27/2023 8 7    • eGFR 01/27/2023 100    • Magnesium 01/27/2023 2 1    • WBC 01/27/2023 15 53 (H)    • RBC 01/27/2023 5 12    • Hemoglobin 01/27/2023 9 7 (L)    • Hematocrit 01/27/2023 32 4 (L)    • MCV 01/27/2023 63 (L)    • MCH 01/27/2023 18 9 (L)    • MCHC 01/27/2023 29 9 (L)    • RDW 01/27/2023 16 3 (H)    • Platelets 88/89/5217 444 (H)    • MPV 01/27/2023 8 4 (L) Imaging Studies: I have personally reviewed pertinent imaging studies

## 2023-01-27 NOTE — PLAN OF CARE
Problem: PAIN - ADULT  Goal: Verbalizes/displays adequate comfort level or baseline comfort level  Description: Interventions:  - Encourage patient to monitor pain and request assistance  - Assess pain using appropriate pain scale  - Administer analgesics based on type and severity of pain and evaluate response  - Implement non-pharmacological measures as appropriate and evaluate response  - Consider cultural and social influences on pain and pain management  - Notify physician/advanced practitioner if interventions unsuccessful or patient reports new pain  Outcome: Progressing     Problem: INFECTION - ADULT  Goal: Absence or prevention of progression during hospitalization  Description: INTERVENTIONS:  - Assess and monitor for signs and symptoms of infection  - Monitor lab/diagnostic results  - Monitor all insertion sites, i e  indwelling lines, tubes, and drains  - Monitor endotracheal if appropriate and nasal secretions for changes in amount and color  - [place] appropriate cooling/warming therapies per order  - Administer medications as ordered  - Instruct and encourage patient and family to use good hand hygiene technique  - Identify and instruct in appropriate isolation precautions for identified infection/condition  Outcome: Progressing     Problem: INFECTION - ADULT  Goal: Absence of fever/infection during neutropenic period  Description: INTERVENTIONS:  - Monitor WBC    Outcome: Progressing     Problem: RESPIRATORY - ADULT  Goal: Achieves optimal ventilation and oxygenation  Description: INTERVENTIONS:  - Assess for changes in respiratory status  - Assess for changes in mentation and behavior  - Position to facilitate oxygenation and minimize respiratory effort  - Oxygen administered by appropriate delivery if ordered  - Initiate smoking cessation education as indicated  - Encourage broncho-pulmonary hygiene including cough, deep breathe, Incentive Spirometry  - Assess the need for suctioning and aspirate as needed  - Assess and instruct to report SOB or any respiratory difficulty  - Respiratory Therapy support as indicated  Outcome: Progressing     Problem: GASTROINTESTINAL - ADULT  Goal: Minimal or absence of nausea and/or vomiting  Description: INTERVENTIONS:  - Administer IV fluids if ordered to ensure adequate hydration  - Maintain NPO status until nausea and vomiting are resolved  - Nasogastric tube if ordered  - Administer ordered antiemetic medications as needed  - Provide nonpharmacologic comfort measures as appropriate  - Advance diet as tolerated, if ordered  - Consider nutrition services referral to assist patient with adequate nutrition and appropriate food choices  Outcome: Progressing     Problem: GASTROINTESTINAL - ADULT  Goal: Maintains or returns to baseline bowel function  Description: INTERVENTIONS:  - Assess bowel function  - Encourage oral fluids to ensure adequate hydration  - Administer IV fluids if ordered to ensure adequate hydration  - Administer ordered medications as needed  - Encourage mobilization and activity  - Consider nutritional services referral to assist patient with adequate nutrition and appropriate food choices  Outcome: Progressing     Problem: GASTROINTESTINAL - ADULT  Goal: Maintains adequate nutritional intake  Description: INTERVENTIONS:  - Monitor percentage of each meal consumed  - Identify factors contributing to decreased intake, treat as appropriate  - Assist with meals as needed  - Monitor I&O, weight, and lab values if indicated  - Obtain nutrition services referral as needed  Outcome: Progressing     Problem: GASTROINTESTINAL - ADULT  Goal: Establish and maintain optimal ostomy function  Description: INTERVENTIONS:  - Assess bowel function  - Encourage oral fluids to ensure adequate hydration  - Administer IV fluids if ordered to ensure adequate hydration   - Administer ordered medications as needed  - Encourage mobilization and activity  - Nutrition services referral to assist patient with appropriate food choices  - Assess stoma site  - Consider wound care consult   Outcome: Progressing     Problem: GASTROINTESTINAL - ADULT  Goal: Oral mucous membranes remain intact  Description: INTERVENTIONS  - Assess oral mucosa and hygiene practices  - Implement preventative oral hygiene regimen  - Implement oral medicated treatments as ordered  - Initiate Nutrition services referral as needed  Outcome: Progressing     Problem: SKIN/TISSUE INTEGRITY - ADULT  Goal: Skin Integrity remains intact(Skin Breakdown Prevention)  Description: Assess:  -Perform Ervin assessment every shift   -Clean and moisturize skin every shift   -Inspect skin when repositioning, toileting, and assisting with ADLS  -Assess under medical devices such as ileostomy pouch   -Assess extremities for adequate circulation and sensation     Skin Care:  -Avoid use of baby powder, tape, friction and shearing, hot water or constrictive clothing  -Do not massage red bony areas    Outcome: Progressing     Problem: SKIN/TISSUE INTEGRITY - ADULT  Goal: Incision(s), wounds(s) or drain site(s) healing without S/S of infection  Description: INTERVENTIONS  - Assess and document dressing, incision, wound bed, drain sites and surrounding tissue  - Provide patient and family education  - Perform skin care/dressing changes every shift    Outcome: Progressing     Problem: HEMATOLOGIC - ADULT  Goal: Maintains hematologic stability  Description: INTERVENTIONS  - Assess for signs and symptoms of bleeding or hemorrhage  - Monitor labs  - Administer supportive blood products/factors as ordered and appropriate  Outcome: Progressing

## 2023-01-28 ENCOUNTER — TELEPHONE (OUTPATIENT)
Dept: FAMILY MEDICINE CLINIC | Facility: CLINIC | Age: 28
End: 2023-01-28

## 2023-01-28 VITALS
TEMPERATURE: 97.7 F | OXYGEN SATURATION: 96 % | HEIGHT: 69 IN | HEART RATE: 73 BPM | DIASTOLIC BLOOD PRESSURE: 65 MMHG | BODY MASS INDEX: 26.78 KG/M2 | RESPIRATION RATE: 18 BRPM | SYSTOLIC BLOOD PRESSURE: 111 MMHG | WEIGHT: 180.78 LBS

## 2023-01-28 LAB
ANION GAP SERPL CALCULATED.3IONS-SCNC: 11 MMOL/L (ref 4–13)
BACTERIA SPT RESP CULT: ABNORMAL
BUN SERPL-MCNC: 5 MG/DL (ref 5–25)
CALCIUM SERPL-MCNC: 8.7 MG/DL (ref 8.3–10.1)
CHLORIDE SERPL-SCNC: 104 MMOL/L (ref 96–108)
CO2 SERPL-SCNC: 26 MMOL/L (ref 21–32)
CREAT SERPL-MCNC: 0.85 MG/DL (ref 0.6–1.3)
ERYTHROCYTE [DISTWIDTH] IN BLOOD BY AUTOMATED COUNT: 16.1 % (ref 11.6–15.1)
GFR SERPL CREATININE-BSD FRML MDRD: 117 ML/MIN/1.73SQ M
GLUCOSE SERPL-MCNC: 88 MG/DL (ref 65–140)
GRAM STN SPEC: ABNORMAL
HCT VFR BLD AUTO: 32.7 % (ref 36.5–49.3)
HGB BLD-MCNC: 9.6 G/DL (ref 12–17)
MCH RBC QN AUTO: 18.7 PG (ref 26.8–34.3)
MCHC RBC AUTO-ENTMCNC: 29.4 G/DL (ref 31.4–37.4)
MCV RBC AUTO: 64 FL (ref 82–98)
PLATELET # BLD AUTO: 470 THOUSANDS/UL (ref 149–390)
PMV BLD AUTO: 8.4 FL (ref 8.9–12.7)
POTASSIUM SERPL-SCNC: 3.7 MMOL/L (ref 3.5–5.3)
PROCALCITONIN SERPL-MCNC: 0.88 NG/ML
RBC # BLD AUTO: 5.14 MILLION/UL (ref 3.88–5.62)
SODIUM SERPL-SCNC: 141 MMOL/L (ref 135–147)
WBC # BLD AUTO: 10.3 THOUSAND/UL (ref 4.31–10.16)

## 2023-01-28 RX ORDER — SACCHAROMYCES BOULARDII 250 MG
250 CAPSULE ORAL 2 TIMES DAILY
Qty: 20 CAPSULE | Refills: 0 | Status: SHIPPED | OUTPATIENT
Start: 2023-01-28 | End: 2023-02-16

## 2023-01-28 RX ORDER — CHOLESTYRAMINE LIGHT 4 G/5.7G
4 POWDER, FOR SUSPENSION ORAL 2 TIMES DAILY
Refills: 0
Start: 2023-01-28 | End: 2023-02-07

## 2023-01-28 RX ORDER — BENZONATATE 200 MG/1
200 CAPSULE ORAL 3 TIMES DAILY
Qty: 20 CAPSULE | Refills: 0 | Status: SHIPPED | OUTPATIENT
Start: 2023-01-28 | End: 2023-02-07

## 2023-01-28 RX ORDER — GUAIFENESIN 1200 MG/1
1200 TABLET, EXTENDED RELEASE ORAL EVERY 12 HOURS SCHEDULED
Qty: 20 TABLET | Refills: 0 | Status: SHIPPED | OUTPATIENT
Start: 2023-01-28 | End: 2023-02-07

## 2023-01-28 RX ORDER — AMOXICILLIN AND CLAVULANATE POTASSIUM 875; 125 MG/1; MG/1
1 TABLET, FILM COATED ORAL 2 TIMES DAILY
Qty: 10 TABLET | Refills: 0 | Status: SHIPPED | OUTPATIENT
Start: 2023-01-28 | End: 2023-02-02

## 2023-01-28 RX ADMIN — BENZONATATE 200 MG: 100 CAPSULE ORAL at 08:38

## 2023-01-28 RX ADMIN — HYDROMORPHONE HYDROCHLORIDE 0.5 MG: 1 INJECTION, SOLUTION INTRAMUSCULAR; INTRAVENOUS; SUBCUTANEOUS at 10:37

## 2023-01-28 RX ADMIN — CHOLESTYRAMINE 4 G: 4 POWDER, FOR SUSPENSION ORAL at 08:38

## 2023-01-28 RX ADMIN — ENOXAPARIN SODIUM 40 MG: 40 INJECTION SUBCUTANEOUS at 08:38

## 2023-01-28 RX ADMIN — HYDROMORPHONE HYDROCHLORIDE 0.5 MG: 1 INJECTION, SOLUTION INTRAMUSCULAR; INTRAVENOUS; SUBCUTANEOUS at 04:27

## 2023-01-28 RX ADMIN — GUAIFENESIN 1200 MG: 600 TABLET, EXTENDED RELEASE ORAL at 08:38

## 2023-01-28 RX ADMIN — SODIUM CHLORIDE 3 G: 9 INJECTION, SOLUTION INTRAVENOUS at 06:00

## 2023-01-28 RX ADMIN — HYDROMORPHONE HYDROCHLORIDE 0.5 MG: 1 INJECTION, SOLUTION INTRAMUSCULAR; INTRAVENOUS; SUBCUTANEOUS at 01:20

## 2023-01-28 RX ADMIN — PANTOPRAZOLE SODIUM 40 MG: 40 TABLET, DELAYED RELEASE ORAL at 06:09

## 2023-01-28 RX ADMIN — HYDROMORPHONE HYDROCHLORIDE 0.5 MG: 1 INJECTION, SOLUTION INTRAMUSCULAR; INTRAVENOUS; SUBCUTANEOUS at 07:33

## 2023-01-28 RX ADMIN — Medication 250 MG: at 08:38

## 2023-01-28 RX ADMIN — HYDROMORPHONE HYDROCHLORIDE 0.5 MG: 1 INJECTION, SOLUTION INTRAMUSCULAR; INTRAVENOUS; SUBCUTANEOUS at 14:09

## 2023-01-28 RX ADMIN — SODIUM CHLORIDE 3 G: 9 INJECTION, SOLUTION INTRAVENOUS at 11:29

## 2023-01-28 NOTE — ASSESSMENT & PLAN NOTE
Hemoglobin stable  Labs in a m      Results from last 7 days   Lab Units 01/27/23  0520 01/26/23  0416 01/25/23  1947 01/24/23  1317 01/21/23  0759   HEMOGLOBIN g/dL 9 7* 9 9* 11 5* 10 5* 9 0*   HEMATOCRIT % 32 4* 32 9* 37 4 34 9* 30 1*   MCV fL 63* 63* 62*  --  63*

## 2023-01-28 NOTE — DISCHARGE INSTR - AVS FIRST PAGE
You will need repeat chest x-ray in 6 to 8 weeks per pulmonary    This can be set up by your primary care doctor or pulmonologist    You were noted to have mild sclerosis in the right manubrium which has been stable since March 2022

## 2023-01-28 NOTE — ASSESSMENT & PLAN NOTE
Patient with chronic abdominal pain requiring multiple hospitalizations  - CT scan of abdomen pelvis with contrast reveals bilateral pneumonia - L > R   Appreciate GI input

## 2023-01-28 NOTE — ASSESSMENT & PLAN NOTE
Recommended outpatient pain management referral for management of chronic abd pain but patient declined during prior admission  • On Dilaudid here - 0 5 mg IV every 3 hours prn

## 2023-01-28 NOTE — ASSESSMENT & PLAN NOTE
biLateral lower lobe pneumonia on imaging  · On IV Unasyn as noted above while here  · Augmentin on discharge

## 2023-01-28 NOTE — ASSESSMENT & PLAN NOTE
Recommended outpatient pain management referral for management of chronic abd pain but patient declined during prior admission  • Continue Dilaudid as needed at home

## 2023-01-28 NOTE — ASSESSMENT & PLAN NOTE
Hemoglobin stable    Results from last 7 days   Lab Units 01/28/23  0611 01/27/23  0520 01/26/23  0416 01/25/23  1947 01/24/23  1317   HEMOGLOBIN g/dL 9 6* 9 7* 9 9* 11 5* 10 5*   HEMATOCRIT % 32 7* 32 4* 32 9* 37 4 34 9*   MCV fL 64* 63* 63* 62*  --

## 2023-01-28 NOTE — DISCHARGE SUMMARY
Julian 43  Discharge- Sena Jaramillo 1995, 32 y o  male MRN: [id number]  Unit/Bed#: [address] [place] [address] Encounter: [id number]  Primary Care Provider: Lachelle Trinidad, DO   Date and time admitted to hospital: 1/25/2023  7:43 PM    * Sepsis [facility])  Assessment & Plan  78 F in ER with tachycardia   · Source likely bilateral pneumonia  Drip score is low  · CT chest with contrast showed bilateral lower lobe pneumonia with mild sclerosis in the right manubrium which is stable  · Given allergy to cefazolin (possible arrhythmia), started on Unasyn  Patient has tolerated Zosyn in the past  · Transition to Augmentin on discharge to complete a total of 7-day course of antibiotics per pulmonary  Patient has tolerated Augmentin in the past    · Blood cultures, urine culture negative so far  · WBC count is almost back to normal   Procalcitonin elevated but trending down    Pneumonia  Assessment & Plan  biLateral lower lobe pneumonia on imaging  · On IV Unasyn as noted above while here  · Augmentin on discharge    Abdominal pain  Assessment & Plan  Patient with chronic abdominal pain requiring multiple hospitalizations  CT scan of abdomen/pelvis with contrast reveals bilateral pneumonia - L > R   Appreciate GI input    Chronic pain syndrome  Assessment & Plan  Recommended outpatient pain management referral for management of chronic abd pain but patient declined during prior admission  • Continue Dilaudid as needed at home    Ileostomy present [facility])  Assessment & Plan  Managed by [facility]  Stool output is improving    Continue Questran    Anemia  Assessment & Plan  Hemoglobin stable    Results from last 7 days   Lab Units 01/28/23  0611 01/27/23  0520 01/26/23  0416 01/25/23  1947 01/24/23  1317   HEMOGLOBIN g/dL 9 6* 9 7* 9 9* 11 5* 10 5*   HEMATOCRIT % 32 7* 32 4* 32 9* 37 4 34 9*   MCV fL 64* 63* 63* 62*  --      Medical Problems     Resolved Problems  Date Reviewed: 1/28/2023   None Discharging Physician / Practitioner: Thor Kent DO  PCP: Sylvia Santizo DO  Admission Date:   Admission Orders (From admission, onward)     Ordered        01/26/23 0115  INPATIENT ADMISSION  Once                      Discharge Date: 01/28/23    Consultations During Hospital Stay:  · Pulmonary  · GI    Procedures Performed:   · none    Significant Findings / Test Results:   · none    Incidental Findings:   · Mild sclerosis in right manubrium - stable, pt made aware    Test Results Pending at Discharge (will require follow up):   · none     Outpatient Tests Requested:  · CXR    Complications:  none    Reason for Admission: Fever, cough and left upper abdominal discomfort    Hospital Course:   Belinda Vergara is a 32 y o  male patient who originally presented to the hospital on 1/25/2023 due to Abdominal pain, fever at home  On imaging noted to have pneumonia  Patient admitted and seen by pulmonary while here  Patient was treated with IV antibiotics with improvement in symptoms  Patient was also seen by GI for abdominal pain  Cleared by all consultants involved in his care prior to discharge  Discharge plan discussed with patient    Please see above list of diagnoses and related plan for additional information  Condition at Discharge: stable    Discharge Day Visit / Exam:   Subjective: States he feels much better today and would like to go home    Vitals: Blood Pressure: 111/65 (01/28/23 0720)  Pulse: 73 (01/28/23 0720)  Temperature: 97 7 °F (36 5 °C) (01/28/23 0720)  Temp Source: Oral (01/26/23 2245)  Respirations: 18 (01/28/23 0720)  Height: 5' 9" (175 3 cm) (01/26/23 0751)  Weight - Scale: 82 kg (180 lb 12 4 oz) (01/26/23 0751)  SpO2: 96 % (01/28/23 0720)  Exam:   Physical Exam  Vitals reviewed  Constitutional:       General: He is not in acute distress  Appearance: He is not ill-appearing  HENT:      Head: Normocephalic and atraumatic     Eyes:      General:         Right eye: No discharge  Left eye: No discharge  Cardiovascular:      Rate and Rhythm: Normal rate and regular rhythm  Pulmonary:      Effort: Pulmonary effort is normal  No respiratory distress  Breath sounds: No wheezing or rales  Comments: Improved air entry bilaterally  Abdominal:      General: Bowel sounds are normal  There is no distension  Palpations: Abdomen is soft  Tenderness: There is no abdominal tenderness  Comments: Ileostomy in place   Neurological:      Mental Status: He is alert and oriented to person, place, and time  Discharge instructions/Information to patient and family:   See after visit summary for information provided to patient and family  Provisions for Follow-Up Care:  See after visit summary for information related to follow-up care and any pertinent home health orders  Disposition:   Home    Planned Readmission: no     Discharge Statement:  I spent > 30 minutes discharging the patient  This time was spent on the day of discharge  I had direct contact with the patient on the day of discharge  Greater than 50% of the total time was spent examining patient, answering all patient questions, arranging and discussing plan of care with patient as well as directly providing post-discharge instructions  Additional time then spent on discharge activities  Discharge Medications:  See after visit summary for reconciled discharge medications provided to patient and/or family        **Please Note: This note may have been constructed using a voice recognition system**

## 2023-01-28 NOTE — ASSESSMENT & PLAN NOTE
78 F in ER with tachycardia   · Source likely bilateral pneumonia  Drip score is low  CT chest with contrast showed bilateral lower lobe pneumonia with mild sclerosis in the right manubrium which is stable  · Given allergy to cefazolin (possible arrhythmia), started on Unasyn    Patient has tolerated Zosyn in the past  · Blood cultures, urine culture negative so far  · Repeat labs in a m  procalcitonin elevated

## 2023-01-28 NOTE — ASSESSMENT & PLAN NOTE
Patient with chronic abdominal pain requiring multiple hospitalizations  CT scan of abdomen/pelvis with contrast reveals bilateral pneumonia - L > R   Appreciate GI input

## 2023-01-28 NOTE — ASSESSMENT & PLAN NOTE
78 F in ER with tachycardia   · Source likely bilateral pneumonia  Drip score is low  · CT chest with contrast showed bilateral lower lobe pneumonia with mild sclerosis in the right manubrium which is stable  · Given allergy to cefazolin (possible arrhythmia), started on Unasyn  Patient has tolerated Zosyn in the past  · Transition to Augmentin on discharge to complete a total of 7-day course of antibiotics per pulmonary    Patient has tolerated Augmentin in the past    · Blood cultures, urine culture negative so far  · WBC count is almost back to normal   Procalcitonin elevated but trending down

## 2023-01-29 NOTE — TELEPHONE ENCOUNTER
----- Message from [facility], DO sent at 1/28/2023  3:36 PM EST -----  Regarding: RE: Discharge  Thank you for allowing us to participate in the care of your patient, George Dye, who was hospitalized from 1/25/2023 through 1/28/2023 with the admitting diagnosis of sepsis due to pneumonia  Treated with IV Unasyn here and transition to Augmentin on discharge      If you have any additional questions or would like to discuss further, please feel free to contact me      [facility], [address] [facility] Internal Medicine, Hospitalist  [phone]

## 2023-01-29 NOTE — ASSESSMENT & PLAN NOTE
Managed by [facility]  Stool output is improving    Continue Questran

## 2023-01-30 ENCOUNTER — TRANSITIONAL CARE MANAGEMENT (OUTPATIENT)
Dept: FAMILY MEDICINE CLINIC | Facility: CLINIC | Age: 28
End: 2023-01-30

## 2023-01-30 NOTE — UTILIZATION REVIEW
NOTIFICATION OF ADMISSION DISCHARGE   This is a Notification of Discharge from [facility]  Please be advised that this patient has been discharge from our facility  Below you will find the admission and discharge date and time including the patient’s disposition  UTILIZATION REVIEW CONTACT:  Jason Ramirez  Utilization   Network Utilization Review Department  Phone: [phone] x carefully listen to the prompts  All voicemails are confidential   Email: [email or web address]     ADMISSION INFORMATION  PRESENTATION DATE: 1/25/2023  7:43 PM  OBERVATION ADMISSION DATE:   INPATIENT ADMISSION DATE: 1/26/23  1:15 AM   DISCHARGE DATE: 1/28/2023  4:11 PM   DISPOSITION:Home/Self Care    IMPORTANT INFORMATION:  Send all requests for admission clinical reviews, approved or denied determinations and any other requests to dedicated fax number below belonging to the campus where the patient is receiving treatment   List of dedicated fax numbers:  [address] DENIALS (Administrative/Medical Necessity) [phone]   [address] (Maternity/NICU/Pediatrics) [phone]   [facility] [phone]   [facility] [phone]   [address] [phone]   [facility] [phone]   [address] [place] [address] [phone]   [address] [place] [address] [phone]   [facility]  [phone]   [facility] [phone]   [address] [place] [address] [address] [facility] [phone]

## 2023-01-31 LAB
BACTERIA BLD CULT: NORMAL
BACTERIA BLD CULT: NORMAL

## 2023-02-04 PROBLEM — A41.9 SEPSIS (HCC): Status: RESOLVED | Noted: 2023-01-26 | Resolved: 2023-02-04

## 2023-02-07 ENCOUNTER — OFFICE VISIT (OUTPATIENT)
Dept: FAMILY MEDICINE CLINIC | Facility: CLINIC | Age: 28
End: 2023-02-07

## 2023-02-07 VITALS
RESPIRATION RATE: 20 BRPM | DIASTOLIC BLOOD PRESSURE: 70 MMHG | HEART RATE: 88 BPM | HEIGHT: 69 IN | BODY MASS INDEX: 27.7 KG/M2 | WEIGHT: 187 LBS | TEMPERATURE: 98.7 F | SYSTOLIC BLOOD PRESSURE: 118 MMHG

## 2023-02-07 DIAGNOSIS — Z87.19 HISTORY OF ULCERATIVE COLITIS: ICD-10-CM

## 2023-02-07 DIAGNOSIS — F41.8 SITUATIONAL ANXIETY: ICD-10-CM

## 2023-02-07 DIAGNOSIS — J18.9 PNEUMONIA OF BOTH LOWER LOBES DUE TO INFECTIOUS ORGANISM: Primary | ICD-10-CM

## 2023-02-07 DIAGNOSIS — R56.9 SEIZURE-LIKE ACTIVITY (HCC): ICD-10-CM

## 2023-02-07 DIAGNOSIS — Z23 IMMUNIZATION DUE: ICD-10-CM

## 2023-02-07 DIAGNOSIS — Z93.2 ILEOSTOMY PRESENT (HCC): ICD-10-CM

## 2023-02-07 DIAGNOSIS — K91.850 ILEAL POUCHITIS (HCC): ICD-10-CM

## 2023-02-07 DIAGNOSIS — F41.1 GAD (GENERALIZED ANXIETY DISORDER): ICD-10-CM

## 2023-02-07 DIAGNOSIS — M45.0 ANKYLOSING SPONDYLITIS OF MULTIPLE SITES IN SPINE (HCC): ICD-10-CM

## 2023-02-07 DIAGNOSIS — K91.858: ICD-10-CM

## 2023-02-07 RX ORDER — ALPRAZOLAM 0.5 MG/1
TABLET ORAL
Qty: 90 TABLET | Refills: 0 | Status: ON HOLD | OUTPATIENT
Start: 2023-02-07

## 2023-02-07 RX ORDER — DULOXETIN HYDROCHLORIDE 60 MG/1
60 CAPSULE, DELAYED RELEASE ORAL DAILY
Qty: 180 CAPSULE | Refills: 1 | Status: ON HOLD | OUTPATIENT
Start: 2023-02-07

## 2023-02-07 NOTE — PROGRESS NOTES
Assessment/Plan:    No problem-specific Assessment & Plan notes found for this encounter  We discussed offering ambien CR 12 5mg in future if he asks and he could try a high output bag if this will help him sleep through the night    Xanax as needed, caution with opioids if ever uses, respiratory suppression risk aware,  Prn use advised and safety with BZD discussed    Try 120mg cymbalta for mood control and if it may help joint pain from AS  If no benefit, could reduce to 60mg  Keep rheumatology f/u appt for AS  Needs to limit nsaid use due to risks of PUD and bleeding and kidneys    Pneumonia on CT but not CXR  Can order f/u CT study if not cleared with upcoming planned CT preop  prevnar 20 suggested in future     Diagnoses and all orders for this visit:    Pneumonia of both lower lobes due to infectious organism    CAR (generalized anxiety disorder)  -     DULoxetine (CYMBALTA) 60 mg delayed release capsule; Take 1 capsule (60 mg total) by mouth daily    Ileostomy present ([address] )    Complications of intestinal pouch (HCC)    Ankylosing spondylitis of multiple sites in spine [facility])    Immunization due    History of ulcerative colitis    Situational anxiety  -     ALPRAZolam (XANAX) 0 5 mg tablet; 1-2 po q8hrs prn anxiety    Seizure-like activity (HCC)    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[address] )        Return in about 6 months (around 8/7/2023)  Subjective:      Patient ID: Chichi Salcido is a 32 y o  male      Chief Complaint   Patient presents with   • Transition of Care Management     Claudia Ridley       [facility]    TCM Call     Date and time call was made  1/30/2023  9:42 AM    Hospital care reviewed  Records reviewed    Patient was hospitialized at  [address] [place] [address]    Date of Admission  01/25/23    Date of discharge  01/28/23    Diagnosis  Sepsis    Disposition  Home    Were the patients medications reviewed and updated  Yes    Current Symptoms  --  Nausea with antibiotcs but it subsides in a few hours      TCM Call     Post hospital issues  None    Should patient be enrolled in anticoag monitoring? No    Scheduled for follow up? Yes    Not clinically warranted  He was re admitted to the hospital in [facility]- currently admitted     Patients specialists  Pulmonlolgist    Pulmonologist name  Dr Terrence Lane    Other specialists names  Dr Curtis Guzman, Surgeon at [facility]    Other specialists contcat #  [facility]    Did you obtain your prescribed medications  Yes    Do you need help managing your prescriptions or medications  No    Is transportation to your appointment needed  No    I have advised the patient to call PCP with any new or worsening symptoms  [facility] [address] or Significiant other    Support System  Family    The type of support provided  Physical; Emotional    Do you have social support  Yes, as much as I need    Are you recieving any outpatient services  No    Are you recieving home care services  No    Types of home care services  Nurse visit    Interperter language line needed  No    Counseling  Patient    Counseling topics  Activities of daily living; Importance of RX compliance; patient and family education; instructions for management; Risk factor reduction    Comments  Kimo Simms states that he is doing fine  He has some nausea after he takes the antibiotic but it subsides after a few hours  He is afebrile  He states his GI doctor in [place] is aware of his recent admissions   He is aware to follow up with Dr Terrence Lane and knows to go to the ER if any abd pain, dyspnea, etc Beuford Bacca LPN          Very stressed  Restarted cymbalta 60mg  Been on for a few months  Has appt with rheum with AS  Lots of advil taken for hip/back pain, aware of bleeding/pud risk  Has pain into right hip  Has had SI injections past    Trouble sleeping  Sleeps about 5hrs total  Wakes frequently to empty ostomy bag  Cautious about anything too sedating that would affect self care    Has CT lungs and abd pending in April 2023 for preop  Aware of CT radiation risks    The following portions of the patient's history were reviewed and updated as appropriate: allergies, current medications, past family history, past medical history, past social history, past surgical history and problem list     Review of Systems   Constitutional: Negative for chills and fever  Current Outpatient Medications   Medication Sig Dispense Refill   • acetaminophen (TYLENOL) 325 mg tablet Take 2 tablets (650 mg total) by mouth every 6 (six) hours as needed for mild pain, headaches or fever  0   • ALPRAZolam (XANAX) 0 5 mg tablet 1-2 po q8hrs prn anxiety 90 tablet 0   • DULoxetine (CYMBALTA) 60 mg delayed release capsule Take 1 capsule (60 mg total) by mouth daily 180 capsule 1   • ferrous sulfate 324 (65 Fe) mg Take 1 tablet (324 mg total) by mouth daily before breakfast 30 tablet 0   • ondansetron (ZOFRAN-ODT) 4 mg disintegrating tablet Take 1 tablet (4 mg total) by mouth every 6 (six) hours as needed for nausea for up to 3 days 9 tablet 0   • pantoprazole (PROTONIX) 40 mg tablet Take 1 tablet (40 mg total) by mouth daily 30 tablet 0   • saccharomyces boulardii (FLORASTOR) 250 mg capsule Take 1 capsule (250 mg total) by mouth 2 (two) times a day 20 capsule 0   • naloxone (NARCAN) 4 mg/0 1 mL nasal spray Administer 1 spray into a nostril  If no response after 2-3 minutes, give another dose in the other nostril using a new spray  (Patient not taking: Reported on 2/7/2023) 1 each 0     No current facility-administered medications for this visit  Objective:    /70   Pulse 88   Temp 98 7 °F (37 1 °C)   Resp 20   Ht 5' 9" (1 753 m)   Wt 84 8 kg (187 lb)   BMI 27 62 kg/m²        Physical Exam  Vitals and nursing note reviewed  Constitutional:       General: He is not in acute distress  Appearance: He is well-developed  He is not ill-appearing  HENT:      Head: Normocephalic  Eyes:      General: No scleral icterus       Conjunctiva/sclera: Conjunctivae normal    Cardiovascular:      Rate and Rhythm: Normal rate and regular rhythm  Pulmonary:      Effort: Pulmonary effort is normal  No respiratory distress  Breath sounds: Normal breath sounds  No wheezing, rhonchi or rales  Abdominal:      General: There is no distension  Palpations: Abdomen is soft  Tenderness: There is no abdominal tenderness  Comments: Right sided ostomy   Musculoskeletal:         General: No deformity  Cervical back: Neck supple  Right lower leg: No edema  Left lower leg: No edema  Skin:     General: Skin is warm and dry  Coloration: Skin is not pale  Neurological:      Mental Status: He is alert  Psychiatric:         Mood and Affect: Mood is anxious  Speech: Speech normal          Behavior: Behavior normal          Thought Content:  Thought content normal                 Ramond Brochure, DO

## 2023-02-10 ENCOUNTER — HOSPITAL ENCOUNTER (INPATIENT)
Facility: HOSPITAL | Age: 28
LOS: 4 days | Discharge: HOME | End: 2023-02-14
Attending: EMERGENCY MEDICINE | Admitting: INTERNAL MEDICINE

## 2023-02-10 ENCOUNTER — APPOINTMENT (EMERGENCY)
Dept: RADIOLOGY | Facility: HOSPITAL | Age: 28
End: 2023-02-10

## 2023-02-10 DIAGNOSIS — Z93.2 ILEOSTOMY PRESENT (HCC): ICD-10-CM

## 2023-02-10 DIAGNOSIS — J18.9 HCAP (HEALTHCARE-ASSOCIATED PNEUMONIA): Primary | ICD-10-CM

## 2023-02-10 DIAGNOSIS — A41.9 SEPSIS (HCC): ICD-10-CM

## 2023-02-10 LAB
ALBUMIN SERPL BCP-MCNC: 3.5 G/DL (ref 3.5–5)
ALP SERPL-CCNC: 130 U/L (ref 46–116)
ALT SERPL W P-5'-P-CCNC: 80 U/L (ref 12–78)
ANION GAP SERPL CALCULATED.3IONS-SCNC: 9 MMOL/L (ref 4–13)
APTT PPP: 30 SECONDS (ref 23–37)
AST SERPL W P-5'-P-CCNC: 70 U/L (ref 5–45)
ATRIAL RATE: 121 BPM
BASOPHILS # BLD AUTO: 0.17 THOUSANDS/ÂΜL (ref 0–0.1)
BASOPHILS NFR BLD AUTO: 1 % (ref 0–1)
BILIRUB SERPL-MCNC: 1.12 MG/DL (ref 0.2–1)
BILIRUB UR QL STRIP: NEGATIVE
BUN SERPL-MCNC: 10 MG/DL (ref 5–25)
CALCIUM SERPL-MCNC: 8.5 MG/DL (ref 8.3–10.1)
CHLORIDE SERPL-SCNC: 99 MMOL/L (ref 96–108)
CLARITY UR: CLEAR
CO2 SERPL-SCNC: 26 MMOL/L (ref 21–32)
COLOR UR: YELLOW
CREAT SERPL-MCNC: 0.96 MG/DL (ref 0.6–1.3)
EOSINOPHIL # BLD AUTO: 0.25 THOUSAND/ÂΜL (ref 0–0.61)
EOSINOPHIL NFR BLD AUTO: 1 % (ref 0–6)
ERYTHROCYTE [DISTWIDTH] IN BLOOD BY AUTOMATED COUNT: 17.3 % (ref 11.6–15.1)
FLUAV RNA RESP QL NAA+PROBE: NEGATIVE
FLUBV RNA RESP QL NAA+PROBE: NEGATIVE
GFR SERPL CREATININE-BSD FRML MDRD: 106 ML/MIN/1.73SQ M
GLUCOSE SERPL-MCNC: 88 MG/DL (ref 65–140)
GLUCOSE UR STRIP-MCNC: NEGATIVE MG/DL
HCT VFR BLD AUTO: 35 % (ref 36.5–49.3)
HGB BLD-MCNC: 10.6 G/DL (ref 12–17)
HGB UR QL STRIP.AUTO: NEGATIVE
IMM GRANULOCYTES # BLD AUTO: 0.09 THOUSAND/UL (ref 0–0.2)
IMM GRANULOCYTES NFR BLD AUTO: 0 % (ref 0–2)
INR PPP: 0.98 (ref 0.84–1.19)
KETONES UR STRIP-MCNC: NEGATIVE MG/DL
LACTATE SERPL-SCNC: 1 MMOL/L (ref 0.5–2)
LEUKOCYTE ESTERASE UR QL STRIP: NEGATIVE
LYMPHOCYTES # BLD AUTO: 1.23 THOUSANDS/ÂΜL (ref 0.6–4.47)
LYMPHOCYTES NFR BLD AUTO: 6 % (ref 14–44)
MCH RBC QN AUTO: 19 PG (ref 26.8–34.3)
MCHC RBC AUTO-ENTMCNC: 30.3 G/DL (ref 31.4–37.4)
MCV RBC AUTO: 63 FL (ref 82–98)
MONOCYTES # BLD AUTO: 1.44 THOUSAND/ÂΜL (ref 0.17–1.22)
MONOCYTES NFR BLD AUTO: 7 % (ref 4–12)
NEUTROPHILS # BLD AUTO: 17.04 THOUSANDS/ÂΜL (ref 1.85–7.62)
NEUTS SEG NFR BLD AUTO: 85 % (ref 43–75)
NITRITE UR QL STRIP: NEGATIVE
NRBC BLD AUTO-RTO: 0 /100 WBCS
P AXIS: 57 DEGREES
PH UR STRIP.AUTO: 6 [PH]
PLATELET # BLD AUTO: 349 THOUSANDS/UL (ref 149–390)
PMV BLD AUTO: 8.5 FL (ref 8.9–12.7)
POTASSIUM SERPL-SCNC: 4.5 MMOL/L (ref 3.5–5.3)
PR INTERVAL: 168 MS
PROCALCITONIN SERPL-MCNC: 0.95 NG/ML
PROT SERPL-MCNC: 7.1 G/DL (ref 6.4–8.4)
PROT UR STRIP-MCNC: NEGATIVE MG/DL
PROTHROMBIN TIME: 13.1 SECONDS (ref 11.6–14.5)
QRS AXIS: -13 DEGREES
QRSD INTERVAL: 84 MS
QT INTERVAL: 290 MS
QTC INTERVAL: 411 MS
RBC # BLD AUTO: 5.59 MILLION/UL (ref 3.88–5.62)
RSV RNA RESP QL NAA+PROBE: NEGATIVE
SARS-COV-2 RNA RESP QL NAA+PROBE: NEGATIVE
SODIUM SERPL-SCNC: 134 MMOL/L (ref 135–147)
SP GR UR STRIP.AUTO: <=1.005 (ref 1–1.03)
T WAVE AXIS: 23 DEGREES
UROBILINOGEN UR QL STRIP.AUTO: 0.2 E.U./DL
VENTRICULAR RATE: 121 BPM
WBC # BLD AUTO: 20.22 THOUSAND/UL (ref 4.31–10.16)

## 2023-02-10 RX ORDER — DEXTROSE AND SODIUM CHLORIDE 5; .45 G/100ML; G/100ML
125 INJECTION, SOLUTION INTRAVENOUS CONTINUOUS
Status: DISCONTINUED | OUTPATIENT
Start: 2023-02-10 | End: 2023-02-13

## 2023-02-10 RX ORDER — HYDROMORPHONE HCL/PF 1 MG/ML
1 SYRINGE (ML) INJECTION
Status: DISCONTINUED | OUTPATIENT
Start: 2023-02-10 | End: 2023-02-14 | Stop reason: HOSPADM

## 2023-02-10 RX ORDER — ONDANSETRON 4 MG/1
4 TABLET, ORALLY DISINTEGRATING ORAL EVERY 6 HOURS PRN
Status: DISCONTINUED | OUTPATIENT
Start: 2023-02-10 | End: 2023-02-11

## 2023-02-10 RX ORDER — SODIUM CHLORIDE 9 MG/ML
100 INJECTION, SOLUTION INTRAVENOUS CONTINUOUS
Status: DISCONTINUED | OUTPATIENT
Start: 2023-02-10 | End: 2023-02-10

## 2023-02-10 RX ORDER — ACETAMINOPHEN 325 MG/1
975 TABLET ORAL ONCE
Status: COMPLETED | OUTPATIENT
Start: 2023-02-10 | End: 2023-02-10

## 2023-02-10 RX ORDER — ENOXAPARIN SODIUM 100 MG/ML
40 INJECTION SUBCUTANEOUS DAILY
Status: DISCONTINUED | OUTPATIENT
Start: 2023-02-10 | End: 2023-02-14 | Stop reason: HOSPADM

## 2023-02-10 RX ORDER — DULOXETIN HYDROCHLORIDE 60 MG/1
60 CAPSULE, DELAYED RELEASE ORAL DAILY
Status: DISCONTINUED | OUTPATIENT
Start: 2023-02-10 | End: 2023-02-10

## 2023-02-10 RX ORDER — ONDANSETRON 2 MG/ML
4 INJECTION INTRAMUSCULAR; INTRAVENOUS ONCE
Status: COMPLETED | OUTPATIENT
Start: 2023-02-10 | End: 2023-02-10

## 2023-02-10 RX ORDER — HYDROMORPHONE HCL/PF 1 MG/ML
1 SYRINGE (ML) INJECTION ONCE
Status: COMPLETED | OUTPATIENT
Start: 2023-02-10 | End: 2023-02-10

## 2023-02-10 RX ORDER — SACCHAROMYCES BOULARDII 250 MG
250 CAPSULE ORAL 2 TIMES DAILY
Status: DISCONTINUED | OUTPATIENT
Start: 2023-02-10 | End: 2023-02-14 | Stop reason: HOSPADM

## 2023-02-10 RX ORDER — PANTOPRAZOLE SODIUM 40 MG/1
40 TABLET, DELAYED RELEASE ORAL DAILY
Status: DISCONTINUED | OUTPATIENT
Start: 2023-02-10 | End: 2023-02-14 | Stop reason: HOSPADM

## 2023-02-10 RX ORDER — ACETAMINOPHEN 325 MG/1
650 TABLET ORAL EVERY 6 HOURS PRN
Status: DISCONTINUED | OUTPATIENT
Start: 2023-02-10 | End: 2023-02-14 | Stop reason: HOSPADM

## 2023-02-10 RX ORDER — FERROUS SULFATE 325(65) MG
325 TABLET ORAL
Status: DISCONTINUED | OUTPATIENT
Start: 2023-02-11 | End: 2023-02-14 | Stop reason: HOSPADM

## 2023-02-10 RX ORDER — BENZONATATE 100 MG/1
100 CAPSULE ORAL 3 TIMES DAILY PRN
Status: DISCONTINUED | OUTPATIENT
Start: 2023-02-10 | End: 2023-02-14 | Stop reason: HOSPADM

## 2023-02-10 RX ORDER — DULOXETIN HYDROCHLORIDE 60 MG/1
60 CAPSULE, DELAYED RELEASE ORAL 2 TIMES DAILY
Status: DISCONTINUED | OUTPATIENT
Start: 2023-02-10 | End: 2023-02-14 | Stop reason: HOSPADM

## 2023-02-10 RX ORDER — HYDROMORPHONE HCL/PF 1 MG/ML
1 SYRINGE (ML) INJECTION EVERY 4 HOURS PRN
Status: DISCONTINUED | OUTPATIENT
Start: 2023-02-10 | End: 2023-02-10

## 2023-02-10 RX ADMIN — PIPERACILLIN SODIUM AND TAZOBACTAM SODIUM 4.5 G: 4; .5 INJECTION, POWDER, LYOPHILIZED, FOR SOLUTION INTRAVENOUS at 09:24

## 2023-02-10 RX ADMIN — IOHEXOL 100 ML: 350 INJECTION, SOLUTION INTRAVENOUS at 10:02

## 2023-02-10 RX ADMIN — BENZONATATE 100 MG: 100 CAPSULE ORAL at 17:09

## 2023-02-10 RX ADMIN — HYDROMORPHONE HYDROCHLORIDE 1 MG: 1 INJECTION, SOLUTION INTRAMUSCULAR; INTRAVENOUS; SUBCUTANEOUS at 09:40

## 2023-02-10 RX ADMIN — HYDROMORPHONE HYDROCHLORIDE 1 MG: 1 INJECTION, SOLUTION INTRAMUSCULAR; INTRAVENOUS; SUBCUTANEOUS at 17:09

## 2023-02-10 RX ADMIN — PIPERACILLIN SODIUM AND TAZOBACTAM SODIUM 4.5 G: 4; .5 INJECTION, POWDER, LYOPHILIZED, FOR SOLUTION INTRAVENOUS at 15:37

## 2023-02-10 RX ADMIN — HYDROMORPHONE HYDROCHLORIDE 1 MG: 1 INJECTION, SOLUTION INTRAMUSCULAR; INTRAVENOUS; SUBCUTANEOUS at 13:12

## 2023-02-10 RX ADMIN — HYDROMORPHONE HYDROCHLORIDE 1 MG: 1 INJECTION, SOLUTION INTRAMUSCULAR; INTRAVENOUS; SUBCUTANEOUS at 09:23

## 2023-02-10 RX ADMIN — ENOXAPARIN SODIUM 40 MG: 40 INJECTION SUBCUTANEOUS at 13:12

## 2023-02-10 RX ADMIN — VANCOMYCIN HYDROCHLORIDE 2000 MG: 10 INJECTION, POWDER, LYOPHILIZED, FOR SOLUTION INTRAVENOUS at 10:32

## 2023-02-10 RX ADMIN — ACETAMINOPHEN 975 MG: 325 TABLET, FILM COATED ORAL at 10:01

## 2023-02-10 RX ADMIN — DEXTROSE AND SODIUM CHLORIDE 125 ML/HR: 5; .45 INJECTION, SOLUTION INTRAVENOUS at 20:58

## 2023-02-10 RX ADMIN — HYDROMORPHONE HYDROCHLORIDE 1 MG: 1 INJECTION, SOLUTION INTRAMUSCULAR; INTRAVENOUS; SUBCUTANEOUS at 10:52

## 2023-02-10 RX ADMIN — PANTOPRAZOLE SODIUM 40 MG: 40 TABLET, DELAYED RELEASE ORAL at 13:12

## 2023-02-10 RX ADMIN — SODIUM CHLORIDE 100 ML/HR: 0.9 INJECTION, SOLUTION INTRAVENOUS at 13:15

## 2023-02-10 RX ADMIN — DULOXETINE HYDROCHLORIDE 60 MG: 60 CAPSULE, DELAYED RELEASE ORAL at 21:10

## 2023-02-10 RX ADMIN — ONDANSETRON 4 MG: 4 TABLET, ORALLY DISINTEGRATING ORAL at 19:08

## 2023-02-10 RX ADMIN — SODIUM CHLORIDE 1000 ML: 0.9 INJECTION, SOLUTION INTRAVENOUS at 09:07

## 2023-02-10 RX ADMIN — Medication 250 MG: at 17:09

## 2023-02-10 RX ADMIN — SODIUM CHLORIDE 1000 ML: 0.9 INJECTION, SOLUTION INTRAVENOUS at 09:31

## 2023-02-10 RX ADMIN — DULOXETINE HYDROCHLORIDE 60 MG: 60 CAPSULE, DELAYED RELEASE ORAL at 13:12

## 2023-02-10 RX ADMIN — ONDANSETRON 4 MG: 2 INJECTION INTRAMUSCULAR; INTRAVENOUS at 09:19

## 2023-02-10 RX ADMIN — HYDROMORPHONE HYDROCHLORIDE 1 MG: 1 INJECTION, SOLUTION INTRAMUSCULAR; INTRAVENOUS; SUBCUTANEOUS at 21:10

## 2023-02-10 RX ADMIN — SODIUM CHLORIDE 1000 ML: 0.9 INJECTION, SOLUTION INTRAVENOUS at 10:03

## 2023-02-10 RX ADMIN — PIPERACILLIN SODIUM AND TAZOBACTAM SODIUM 4.5 G: 4; .5 INJECTION, POWDER, LYOPHILIZED, FOR SOLUTION INTRAVENOUS at 21:04

## 2023-02-10 NOTE — PLAN OF CARE
Problem: RESPIRATORY - ADULT  Goal: Achieves optimal ventilation and oxygenation  Description: INTERVENTIONS:  - Assess for changes in respiratory status  - Assess for changes in mentation and behavior  - Position to facilitate oxygenation and minimize respiratory effort  - Oxygen administered by appropriate delivery if ordered  - Initiate smoking cessation education as indicated  - Encourage broncho-pulmonary hygiene including cough, deep breathe, Incentive Spirometry  - Assess the need for suctioning and aspirate as needed  - Assess and instruct to report SOB or any respiratory difficulty  - Respiratory Therapy support as indicated  Outcome: Progressing     Problem: CARDIOVASCULAR - ADULT  Goal: Maintains optimal cardiac output and hemodynamic stability  Description: INTERVENTIONS:  - Monitor I/O, vital signs and rhythm  - Monitor for S/S and trends of decreased cardiac output  - Administer and titrate ordered vasoactive medications to optimize hemodynamic stability  - Assess quality of pulses, skin color and temperature  - Assess for signs of decreased coronary artery perfusion  - Instruct patient to report change in severity of symptoms  Outcome: Progressing  Goal: Absence of cardiac dysrhythmias or at baseline rhythm  Description: INTERVENTIONS:  - Continuous cardiac monitoring, vital signs, obtain 12 lead EKG if ordered  - Administer antiarrhythmic and heart rate control medications as ordered  - Monitor electrolytes and administer replacement therapy as ordered  Outcome: Progressing     Problem: GASTROINTESTINAL - ADULT  Goal: Minimal or absence of nausea and/or vomiting  Description: INTERVENTIONS:  - Administer IV fluids if ordered to ensure adequate hydration  - Maintain NPO status until nausea and vomiting are resolved  - Nasogastric tube if ordered  - Administer ordered antiemetic medications as needed  - Provide nonpharmacologic comfort measures as appropriate  - Advance diet as tolerated, if ordered  - Consider nutrition services referral to assist patient with adequate nutrition and appropriate food choices  Outcome: Progressing  Goal: Establish and maintain optimal ostomy function  Description: INTERVENTIONS:  - Assess bowel function  - Encourage oral fluids to ensure adequate hydration  - Administer IV fluids if ordered to ensure adequate hydration   - Administer ordered medications as needed  - Encourage mobilization and activity  - Nutrition services referral to assist patient with appropriate food choices  - Assess stoma site  - Consider wound care consult   Outcome: Progressing     Problem: PAIN - ADULT  Goal: Verbalizes/displays adequate comfort level or baseline comfort level  Description: Interventions:  - Encourage patient to monitor pain and request assistance  - Assess pain using appropriate pain scale  - Administer analgesics based on type and severity of pain and evaluate response  - Implement non-pharmacological measures as appropriate and evaluate response  - Consider cultural and social influences on pain and pain management  - Notify physician/advanced practitioner if interventions unsuccessful or patient reports new pain  Outcome: Progressing     Problem: INFECTION - ADULT  Goal: Absence or prevention of progression during hospitalization  Description: INTERVENTIONS:  - Assess and monitor for signs and symptoms of infection  - Monitor lab/diagnostic results  - Monitor all insertion sites, i e  indwelling lines, tubes, and drains  - Monitor endotracheal if appropriate and nasal secretions for changes in amount and color  - [place] appropriate cooling/warming therapies per order  - Administer medications as ordered  - Instruct and encourage patient and family to use good hand hygiene technique  - Identify and instruct in appropriate isolation precautions for identified infection/condition  Outcome: Progressing  Goal: Absence of fever/infection during neutropenic period  Description: INTERVENTIONS:  - Monitor WBC    Outcome: Progressing     Problem: DISCHARGE PLANNING  Goal: Discharge to home or other facility with appropriate resources  Description: INTERVENTIONS:  - Identify barriers to discharge w/patient and caregiver  - Arrange for needed discharge resources and transportation as appropriate  - Identify discharge learning needs (meds, wound care, etc )  - Arrange for interpretive services to assist at discharge as needed  - Refer to Case Management Department for coordinating discharge planning if the patient needs post-hospital services based on physician/advanced practitioner order or complex needs related to functional status, cognitive ability, or social support system  Outcome: Progressing     Problem: Knowledge Deficit  Goal: Patient/family/caregiver demonstrates understanding of disease process, treatment plan, medications, and discharge instructions  Description: Complete learning assessment and assess knowledge base    Interventions:  - Provide teaching at level of understanding  - Provide teaching via preferred learning methods  Outcome: Progressing

## 2023-02-10 NOTE — CONSULTS
Consultation - Pulmonary Medicine   Jamie Scott 32 y o  male MRN: [id number]  Unit/Bed#: [address] [place] [address] Encounter: [id number]      Assessment/Plan:    Abnormal CT chest with patchy bibasilar alveolar/groundglass opacities with recent aspiration pneumonia   Status post course of Unasyn/Augmentin 2 weeks ago  Dense consolidation has resolved and now has opacities possibly consistent with resolution of prior pneumonia plus minus pneumonitis  Plan for sepsis and antibiotics as listed below  Repeat imaging pending course  Sepsis present on admission   Pneumonitis versus early pneumonia versus GI etiology  General surgery is consulted due to distended J-pouch and continued mucosal enhancement  Continue with Zosyn and follow procalcitonin/culture data  Monitor temperatures and WBCs  Ulcerative colitis with abdominal pain/vomiting/ileostomy   Surgery consult pending  Outpatient follow-up pending course  Discussed with primary team     History of Present Illness   Physician Requesting Consult: Kaden Hill MD  Reason for Consult / Principal Problem: HCAP  Hx and PE limited by: None  Chief Complaint: "I just did not feel right "  HPI: Jamie Scott is a 32 y o  male who presented to [address] [place] [address] with complaints of generally feeling unwell, vomiting, and fever  He was recently hospitalized for abdominal pain and vomiting with aspiration pneumonia and completed a course of Unasyn/Augmentin  He was feeling well for nearly 2 weeks until yesterday when he was at work and just did not feel right  He was slightly nauseated and had malaise  When he went home, he was resting and noted a fever and took Tylenol  He had subsequent vomiting that was intractable, as well as pain  He presents to the ER for further evaluation  His cough and shortness of breath from his prior hospitalization had completely resolved and he has no complaints at present    Aside from the above, no other complaints  Inpatient consult to Pulmonology  Consult performed by: KY Garcia  Consult ordered by: KY Green        Review of Systems   All other systems reviewed and are negative  A full 12-point review of systems was completed and is negative except for those outlined in the HPI  Historical Information   Past Medical History:   Diagnosis Date   • Ankylosing spondylitis ([facility] )    • Anxiety    • Bowel obstruction ([facility] )    • Clostridium difficile colitis 9/13/2018   • Colitis    • 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[facility] ) 9/13/2018   • Pancreatitis    • Ulcerative colitis ([facility] )      Past Surgical History:   Procedure Laterality Date   • COLECTOMY TOTAL      with ileal pouch and anastemosis   • IR PICC PLACEMENT SINGLE LUMEN  3/1/2022   • TOTAL COLECTOMY       Social History   Social History     Substance and Sexual Activity   Alcohol Use Not Currently    Comment: pt states 5 a month/socially     Social History     Substance and Sexual Activity   Drug Use No     Social History     Tobacco Use   Smoking Status Never   Smokeless Tobacco Never     E-Cigarette/Vaping   • E-Cigarette Use Never User      E-Cigarette/Vaping Substances   • Nicotine No    • THC No    • CBD No    • Flavoring No    • Other No    • Unknown No      Occupational History: Works as a       Family History:   Family History   Problem Relation Age of Onset   • Lung disease Neg Hx        Meds/Allergies   all current active meds have been reviewed, pertinent pulmonary meds have been reviewed, current meds:   Current Facility-Administered Medications   Medication Dose Route Frequency   • acetaminophen (TYLENOL) tablet 650 mg  650 mg Oral Q6H PRN   • benzonatate (TESSALON PERLES) capsule 100 mg  100 mg Oral TID PRN   • DULoxetine (CYMBALTA) delayed release capsule 60 mg  60 mg Oral Daily   • enoxaparin (LOVENOX) subcutaneous injection 40 mg  40 mg Subcutaneous Daily   • [START ON 2/11/2023] ferrous sulfate tablet 325 mg  325 mg Oral Daily With Breakfast   • HYDROmorphone (DILAUDID) injection 1 mg  1 mg Intravenous Q4H PRN   • ondansetron (ZOFRAN-ODT) dispersible tablet 4 mg  4 mg Oral Q6H PRN   • pantoprazole (PROTONIX) EC tablet 40 mg  40 mg Oral Daily   • piperacillin-tazobactam (ZOSYN) 4 5 g in sodium chloride 0 9 % 100 mL IVPB  4 5 g Intravenous Q6H   • saccharomyces boulardii (FLORASTOR) capsule 250 mg  250 mg Oral BID   • sodium chloride 0 9 % infusion  100 mL/hr Intravenous Continuous    and PTA meds:   Prior to Admission Medications   Prescriptions Last Dose Informant Patient Reported? Taking? ALPRAZolam (XANAX) 0 5 mg tablet   No No   Si-2 po q8hrs prn anxiety   DULoxetine (CYMBALTA) 60 mg delayed release capsule   No No   Sig: Take 1 capsule (60 mg total) by mouth daily   acetaminophen (TYLENOL) 325 mg tablet   No No   Sig: Take 2 tablets (650 mg total) by mouth every 6 (six) hours as needed for mild pain, headaches or fever   ferrous sulfate 324 (65 Fe) mg   No No   Sig: Take 1 tablet (324 mg total) by mouth daily before breakfast   naloxone (NARCAN) 4 mg/0 1 mL nasal spray   No No   Sig: Administer 1 spray into a nostril  If no response after 2-3 minutes, give another dose in the other nostril using a new spray     Patient not taking: Reported on 2023   ondansetron (ZOFRAN-ODT) 4 mg disintegrating tablet   No No   Sig: Take 1 tablet (4 mg total) by mouth every 6 (six) hours as needed for nausea for up to 3 days   pantoprazole (PROTONIX) 40 mg tablet   No No   Sig: Take 1 tablet (40 mg total) by mouth daily   saccharomyces boulardii (FLORASTOR) 250 mg capsule   No No   Sig: Take 1 capsule (250 mg total) by mouth 2 (two) times a day      Facility-Administered Medications: None       Allergies   Allergen Reactions   • Cefazolin Hives     Other reaction(s): Arrythmia (Abnormal Heartbeat), Rash Maculopapular   • Mesalamine GI Intolerance     Other reaction(s): Pancreatitis  Annotation - 2016: pancreatitis   • Wound Dressings Rash     Coloplast ostomy Products        Objective   Vitals: Blood pressure 133/77, pulse 90, temperature 98 1 °F (36 7 °C), temperature source Oral, resp  rate 22, height 5' 9" (1 753 m), weight 85 6 kg (188 lb 11 4 oz), SpO2 94 %  RA,Body mass index is 27 87 kg/m²  Intake/Output Summary (Last 24 hours) at 2/10/2023 1400  Last data filed at 2/10/2023 1131  Gross per 24 hour   Intake 3100 ml   Output --   Net 3100 ml     Invasive Devices     Peripheral Intravenous Line  Duration           Peripheral IV 02/10/23 Left Hand <1 day    Peripheral IV 02/10/23 Right Antecubital <1 day          Drain  Duration           Ileostomy Continent (Abdominal pouch) RLQ 81 days              Physical Exam  Vitals reviewed  Constitutional:       General: He is not in acute distress  Appearance: He is well-developed  He is not toxic-appearing or diaphoretic  Interventions: Nasal cannula in place  HENT:      Head: Normocephalic and atraumatic  Eyes:      General: No scleral icterus  Neck:      Trachea: No tracheal deviation  Cardiovascular:      Rate and Rhythm: Normal rate and regular rhythm  Heart sounds: S1 normal and S2 normal  No murmur heard  No friction rub  No gallop  Pulmonary:      Effort: Pulmonary effort is normal  No tachypnea, accessory muscle usage or respiratory distress  Breath sounds: Normal breath sounds  No stridor  No decreased breath sounds, wheezing, rhonchi or rales  Chest:      Chest wall: No tenderness  Abdominal:      General: Bowel sounds are normal  There is no distension  Palpations: Abdomen is soft  Tenderness: There is no abdominal tenderness  Musculoskeletal:         General: No tenderness  Cervical back: Neck supple  Right lower leg: No edema  Left lower leg: No edema  Skin:     General: Skin is warm and dry  Findings: No rash  Neurological:      Mental Status: He is alert and oriented to person, place, and time  GCS: GCS eye subscore is 4  GCS verbal subscore is 5  GCS motor subscore is 6  Psychiatric:         Speech: Speech normal          Behavior: Behavior normal  Behavior is cooperative  Lab Results:   CBC:   Lab Results   Component Value Date    WBC 20 22 (H) 02/10/2023    HGB 10 6 (L) 02/10/2023    HCT 35 0 (L) 02/10/2023    MCV 63 (L) 02/10/2023     02/10/2023    MCH 19 0 (L) 02/10/2023    MCHC 30 3 (L) 02/10/2023    RDW 17 3 (H) 02/10/2023    MPV 8 5 (L) 02/10/2023    NRBC 0 02/10/2023   , CMP:   Lab Results   Component Value Date    SODIUM 134 (L) 02/10/2023    K 4 5 02/10/2023    CL 99 02/10/2023    CO2 26 02/10/2023    BUN 10 02/10/2023    CREATININE 0 96 02/10/2023    CALCIUM 8 5 02/10/2023    AST 70 (H) 02/10/2023    ALT 80 (H) 02/10/2023    ALKPHOS 130 (H) 02/10/2023    EGFR 106 02/10/2023     Lactic Acid: 1 0    Procalcitonin: 0 95    Flu/COVID/RSV PCR: Negative    Culture Data: Blood cultures x 2 pending; Sputum culture pending    Urinary antigens: Pending    Imaging Studies: I have personally reviewed pertinent reports  and I have personally reviewed pertinent films in PACS   CT chest shows improved bibasilar consolidation now replaced with slightly more diffuse groundglass opacities  EKG, Pathology, and Other Studies: I have personally reviewed pertinent reports  Echocardiogram from 01/26/2023 shows EF 60% with normal RV systolic function  Pulmonary Results (PFTs, PSG): None    VTE Prophylaxis: Sequential compression device (Venodyne)  and Enoxaparin (Lovenox)    Code Status: Level 1 - Full Code    None    Portions of the record may have been created with voice recognition software  Occasional wrong word or "sound a like" substitutions may have occurred due to the inherent limitations of voice recognition software  Read the chart carefully and recognize, using context, where substitutions have occurred

## 2023-02-10 NOTE — ED PROVIDER NOTES
History  Chief Complaint   Patient presents with   • Vomiting     Pt reports n/v x 3 episodes (non bloody) onset 02:30am, + abd pain hx obstruction and ileostomy noted fever this am, recent pneumonia 2 weeks ago was admitted to hospital, no recent ill contacts   Ric Jasmine recently dx with peptic ulcer     [id number] hx complicated surgeries / bowel resections d/t UC with frequent ED visits and hospitalizations, most recently admitted for sepsis d/t CAP (bilat lower pna) , reports he had been much improved until yesterday started coughing again  Also has a flare of his typical abd pain which is diffuse and radiates to the L flank  Associated with n/v (nb/nb)  Prior to Admission Medications   Prescriptions Last Dose Informant Patient Reported? Taking? ALPRAZolam (XANAX) 0 5 mg tablet   No No   Si-2 po q8hrs prn anxiety   DULoxetine (CYMBALTA) 60 mg delayed release capsule   No No   Sig: Take 1 capsule (60 mg total) by mouth daily   acetaminophen (TYLENOL) 325 mg tablet   No No   Sig: Take 2 tablets (650 mg total) by mouth every 6 (six) hours as needed for mild pain, headaches or fever   ferrous sulfate 324 (65 Fe) mg   No No   Sig: Take 1 tablet (324 mg total) by mouth daily before breakfast   naloxone (NARCAN) 4 mg/0 1 mL nasal spray   No No   Sig: Administer 1 spray into a nostril  If no response after 2-3 minutes, give another dose in the other nostril using a new spray     Patient not taking: Reported on 2023   ondansetron (ZOFRAN-ODT) 4 mg disintegrating tablet   No No   Sig: Take 1 tablet (4 mg total) by mouth every 6 (six) hours as needed for nausea for up to 3 days   pantoprazole (PROTONIX) 40 mg tablet   No No   Sig: Take 1 tablet (40 mg total) by mouth daily   saccharomyces boulardii (FLORASTOR) 250 mg capsule   No No   Sig: Take 1 capsule (250 mg total) by mouth 2 (two) times a day      Facility-Administered Medications: None       Past Medical History:   Diagnosis Date   • Ankylosing spondylitis (HCC)    • Anxiety    • Bowel obstruction (HCC)    • Clostridium difficile colitis 9/13/2018   • Colitis    • 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[facility] [address] ) 9/13/2018   • Pancreatitis    • Ulcerative colitis ([facility] [address] )        Past Surgical History:   Procedure Laterality Date   • COLECTOMY TOTAL      with ileal pouch and anastemosis   • IR PICC PLACEMENT SINGLE LUMEN  3/1/2022   • TOTAL COLECTOMY         Family History   Problem Relation Age of Onset   • Lung disease Neg Hx      I have reviewed and agree with the history as documented  E-Cigarette/Vaping   • E-Cigarette Use Never User      E-Cigarette/Vaping Substances   • Nicotine No    • THC No    • CBD No    • Flavoring No    • Other No    • Unknown No      Social History     Tobacco Use   • Smoking status: Never   • Smokeless tobacco: Never   Vaping Use   • Vaping Use: Never used   Substance Use Topics   • Alcohol use: Not Currently     Comment: pt states 5 a month/socially   • Drug use: No       Review of Systems   Genitourinary: Negative for dysuria and hematuria  Physical Exam  Physical Exam  Vitals reviewed  Constitutional:       Appearance: He is well-developed  He is ill-appearing and diaphoretic  HENT:      Head: Normocephalic and atraumatic  Right Ear: External ear normal       Left Ear: External ear normal       Nose: Nose normal  No rhinorrhea  Mouth/Throat:      Mouth: Mucous membranes are moist    Eyes:      Conjunctiva/sclera: Conjunctivae normal    Cardiovascular:      Rate and Rhythm: Regular rhythm  Tachycardia present  Pulmonary:      Effort: Pulmonary effort is normal       Breath sounds: Normal breath sounds  No wheezing or rales  Abdominal:      Palpations: Abdomen is soft  Tenderness: There is no abdominal tenderness  Musculoskeletal:      Cervical back: Neck supple  Right lower leg: No edema  Left lower leg: No edema  Skin:     General: Skin is warm     Neurological:      Mental Status: He is alert and oriented to person, place, and time     Psychiatric:         Mood and Affect: Mood normal          Vital Signs  ED Triage Vitals [02/10/23 0844]   Temperature Pulse Respirations Blood Pressure SpO2   (!) 102 5 °F (39 2 °C) (!) 140 19 121/63 95 %      Temp Source Heart Rate Source Patient Position - Orthostatic VS BP Location FiO2 (%)   Oral Monitor Sitting Left arm --      Pain Score       8           Vitals:    02/10/23 1200 02/10/23 1228 02/10/23 1312 02/10/23 1500   BP: 92/56 133/77  107/59   Pulse: (!) 110 102 90 98   Patient Position - Orthostatic VS:  Lying  Lying         Visual Acuity      ED Medications  Medications   HYDROmorphone (DILAUDID) injection 1 mg (1 mg Intravenous Given 2/10/23 1312)   DULoxetine (CYMBALTA) delayed release capsule 60 mg (60 mg Oral Given 2/10/23 1312)   ferrous sulfate tablet 325 mg (has no administration in time range)   pantoprazole (PROTONIX) EC tablet 40 mg (40 mg Oral Given 2/10/23 1312)   saccharomyces boulardii (FLORASTOR) capsule 250 mg (has no administration in time range)   acetaminophen (TYLENOL) tablet 650 mg (has no administration in time range)   ondansetron (ZOFRAN-ODT) dispersible tablet 4 mg (has no administration in time range)   sodium chloride 0 9 % infusion (100 mL/hr Intravenous New Bag 2/10/23 1315)   enoxaparin (LOVENOX) subcutaneous injection 40 mg (40 mg Subcutaneous Given 2/10/23 1312)   piperacillin-tazobactam (ZOSYN) 4 5 g in sodium chloride 0 9 % 100 mL IVPB (4 5 g Intravenous New Bag 2/10/23 1537)   benzonatate (TESSALON PERLES) capsule 100 mg (has no administration in time range)   sodium chloride 0 9 % bolus 1,000 mL (0 mL Intravenous Stopped 2/10/23 1002)     Followed by   sodium chloride 0 9 % bolus 1,000 mL (0 mL Intravenous Stopped 2/10/23 1034)     Followed by   sodium chloride 0 9 % bolus 1,000 mL (0 mL Intravenous Stopped 2/10/23 1131)   vancomycin (VANCOCIN) 2,000 mg in sodium chloride 0 9 % 500 mL IVPB (2,000 mg Intravenous Continue to Inpatient Floor 2/10/23 1205)   piperacillin-tazobactam (ZOSYN) IVPB 4 5 g (0 g Intravenous Stopped 2/10/23 1031)   acetaminophen (TYLENOL) tablet 975 mg (975 mg Oral Given 2/10/23 1001)   HYDROmorphone (DILAUDID) injection 1 mg (1 mg Intravenous Given 2/10/23 0923)   ondansetron (ZOFRAN) injection 4 mg (4 mg Intravenous Given 2/10/23 0919)   HYDROmorphone (DILAUDID) injection 1 mg (1 mg Intravenous Given 2/10/23 0940)   iohexol (OMNIPAQUE) 350 MG/ML injection (SINGLE-DOSE) 100 mL (100 mL Intravenous Given 2/10/23 1002)   HYDROmorphone (DILAUDID) injection 1 mg (1 mg Intravenous Given 2/10/23 1052)       Diagnostic Studies  Results Reviewed     Procedure Component Value Units Date/Time    Blood culture #1 [[id number]] Collected: 02/10/23 0901    Lab Status: Preliminary result Specimen: Blood from Arm, Left Updated: 02/10/23 1501     Blood Culture Received in Microbiology Lab  Culture in Progress  Blood culture #2 [[id number]] Collected: 02/10/23 0901    Lab Status: Preliminary result Specimen: Blood from Arm, Left Updated: 02/10/23 1501     Blood Culture Received in Microbiology Lab  Culture in Progress  FLU/RSV/COVID - if FLU/RSV clinically relevant [[id number]]  (Normal) Collected: 02/10/23 0936    Lab Status: Final result Specimen: Nares from Nose Updated: 02/10/23 1026     SARS-CoV-2 Negative     INFLUENZA A PCR Negative     INFLUENZA B PCR Negative     RSV PCR Negative    Narrative:      FOR PEDIATRIC PATIENTS - copy/paste COVID Guidelines URL to browser: [email or web address]    SARS-CoV-2 assay is a Nucleic Acid Amplification assay intended for the  qualitative detection of nucleic acid from SARS-CoV-2 in nasopharyngeal  swabs  Results are for the presumptive identification of SARS-CoV-2 RNA  Positive results are indicative of infection with SARS-CoV-2, the virus  causing COVID-19, but do not rule out bacterial infection or co-infection  with other viruses  Laboratories within the [place] and its  territories are required to report all positive results to the appropriate  public health authorities  Negative results do not preclude SARS-CoV-2  infection and should not be used as the sole basis for treatment or other  patient management decisions  Negative results must be combined with  clinical observations, patient history, and epidemiological information  This test has not been FDA cleared or approved  This test has been authorized by FDA under an Emergency Use Authorization  (EUA)  This test is only authorized for the duration of time the  declaration that circumstances exist justifying the authorization of the  emergency use of an in vitro diagnostic tests for detection of SARS-CoV-2  virus and/or diagnosis of COVID-19 infection under section 564(b)(1) of  the Act, 21 U  S C  695MOO-3(W)(0), unless the authorization is terminated  or revoked sooner  The test has been validated but independent review by FDA  and CLIA is pending  Test performed using DNA SEQ GeneXpert: This RT-PCR assay targets N2,  a region unique to SARS-CoV-2  A conserved region in the E-gene was chosen  for pan-Sarbecovirus detection which includes SARS-CoV-2  According to CMS-2020-01-R, this platform meets the definition of high-throughput technology  Procalcitonin [[id number]]  (Abnormal) Collected: 02/10/23 0901    Lab Status: Final result Specimen: Blood from Arm, Left Updated: 02/10/23 0940     Procalcitonin 0 95 ng/ml     Lactic acid [[id number]]  (Normal) Collected: 02/10/23 0901    Lab Status: Final result Specimen: Blood from Arm, Left Updated: 02/10/23 0935     LACTIC ACID 1 0 mmol/L     Narrative:      Result may be elevated if tourniquet was used during collection      Comprehensive metabolic panel [[id number]]  (Abnormal) Collected: 02/10/23 0901    Lab Status: Final result Specimen: Blood from Arm, Left Updated: 02/10/23 0930     Sodium 134 mmol/L      Potassium 4 5 mmol/L      Chloride 99 mmol/L      CO2 26 mmol/L      ANION GAP 9 mmol/L      BUN 10 mg/dL      Creatinine 0 96 mg/dL      Glucose 88 mg/dL      Calcium 8 5 mg/dL      AST 70 U/L      ALT 80 U/L      Alkaline Phosphatase 130 U/L      Total Protein 7 1 g/dL      Albumin 3 5 g/dL      Total Bilirubin 1 12 mg/dL      eGFR 106 ml/min/1 73sq m     Narrative:      National Kidney Disease Foundation guidelines for Chronic Kidney Disease (CKD):   •  Stage 1 with normal or high GFR (GFR > 90 mL/min/1 73 square meters)  •  Stage 2 Mild CKD (GFR = 60-89 mL/min/1 73 square meters)  •  Stage 3A Moderate CKD (GFR = 45-59 mL/min/1 73 square meters)  •  Stage 3B Moderate CKD (GFR = 30-44 mL/min/1 73 square meters)  •  Stage 4 Severe CKD (GFR = 15-29 mL/min/1 73 square meters)  •  Stage 5 End Stage CKD (GFR <15 mL/min/1 73 square meters)  Note: GFR calculation is accurate only with a steady state creatinine    APTT [[id number]]  (Normal) Collected: 02/10/23 0901    Lab Status: Final result Specimen: Blood from Arm, Left Updated: 02/10/23 0924     PTT 30 seconds     Protime-INR [[id number]]  (Normal) Collected: 02/10/23 0901    Lab Status: Final result Specimen: Blood from Arm, Left Updated: 02/10/23 0924     Protime 13 1 seconds      INR 0 98    CBC and differential [[id number]]  (Abnormal) Collected: 02/10/23 0901    Lab Status: Final result Specimen: Blood from Arm, Left Updated: 02/10/23 0912     WBC 20 22 Thousand/uL      RBC 5 59 Million/uL      Hemoglobin 10 6 g/dL      Hematocrit 35 0 %      MCV 63 fL      MCH 19 0 pg      MCHC 30 3 g/dL      RDW 17 3 %      MPV 8 5 fL      Platelets 127 Thousands/uL      nRBC 0 /100 WBCs      Neutrophils Relative 85 %      Immat GRANS % 0 %      Lymphocytes Relative 6 %      Monocytes Relative 7 %      Eosinophils Relative 1 %      Basophils Relative 1 %      Neutrophils Absolute 17 04 Thousands/µL      Immature Grans Absolute 0 09 Thousand/uL      Lymphocytes Absolute 1 23 Thousands/µL Monocytes Absolute 1 44 Thousand/µL      Eosinophils Absolute 0 25 Thousand/µL      Basophils Absolute 0 17 Thousands/µL     UA w Reflex to Microscopic w Reflex to Culture [[id number]]     Lab Status: No result Specimen: Urine                  CT chest abdomen pelvis w contrast   Final Result by Bety Del Toro MD (02/10 1274)      Bilateral pulmonary consolidations are now replaced by groundglass opacities although in a larger extent  Although these may represent resolution of prior consolidation, a new pneumonia is possible and should be correlated clinically with new pulmonary    symptomatology  A few prominent small bowel loops are seen but without significant zone of transition to suggest obstruction at this time  Follow-up may be useful  There is more distention of the J-pouch with continued mucosal enhancement  Surgical assessment is advised given the variable distention of this segment prior scans  This was discussed with Dr Abiel Gill at 10:50 AM            Workstation performed: [id number]                    Procedures  CriticalCare Time  Performed by: Boubacar Espinoza DO  Authorized by: Boubacar Espinoza DO     Critical care provider statement:     Critical care time (minutes):  50    Critical care was necessary to treat or prevent imminent or life-threatening deterioration of the following conditions:  Sepsis    Critical care was time spent personally by me on the following activities:  Ordering and review of laboratory studies, ordering and review of radiographic studies, discussions with consultants, evaluation of patient's response to treatment, re-evaluation of patient's condition and examination of patient             ED Course                                             Medical Decision Making  Pt is ill appearing  Sepsis orders placed  Workup revealing possible new infiltrate compared to last month  Vanco/Zosyn for HCAP coverage  Admitted to medicine for further mgmt  HCAP (healthcare-associated pneumonia): acute illness or injury  Amount and/or Complexity of Data Reviewed  Radiology: ordered  ECG/medicine tests: independent interpretation performed  Details: sinus tach no ischemia or ectopy      Risk  OTC drugs  Prescription drug management  Parenteral controlled substances  Drug therapy requiring intensive monitoring for toxicity  Decision regarding hospitalization  Critical Care  Total time providing critical care: 30-74 minutes      Disposition  Final diagnoses:   HCAP (healthcare-associated pneumonia)     Time reflects when diagnosis was documented in both MDM as applicable and the Disposition within this note     Time User Action Codes Description Comment    2/10/2023 11:10 AM Vee Iqbal Add [J18 9] HCAP (healthcare-associated pneumonia)     2/10/2023 12:42 PM Hiram Cortes Add [Z93 2] Ileostomy present [facility])       ED Disposition     ED Disposition   Admit    Condition   Stable    Date/Time   Fri Feb 10, 2023 11:10 AM    Comment   Case was discussed with Dr Jhoan Escobar and the patient's admission status was agreed to be Admission Status: inpatient status to the service of Dr Jhoan Escobar             Follow-up Information    None         Current Discharge Medication List      CONTINUE these medications which have NOT CHANGED    Details   acetaminophen (TYLENOL) 325 mg tablet Take 2 tablets (650 mg total) by mouth every 6 (six) hours as needed for mild pain, headaches or fever  Refills: 0    Associated Diagnoses: Epigastric abdominal pain      ALPRAZolam (XANAX) 0 5 mg tablet 1-2 po q8hrs prn anxiety  Qty: 90 tablet, Refills: 0    Associated Diagnoses: Situational anxiety      DULoxetine (CYMBALTA) 60 mg delayed release capsule Take 1 capsule (60 mg total) by mouth daily  Qty: 180 capsule, Refills: 1    Associated Diagnoses: CAR (generalized anxiety disorder)      ferrous sulfate 324 (65 Fe) mg Take 1 tablet (324 mg total) by mouth daily before breakfast  Qty: 30 tablet, Refills: 0    Associated Diagnoses: Anemia      naloxone (NARCAN) 4 mg/0 1 mL nasal spray Administer 1 spray into a nostril  If no response after 2-3 minutes, give another dose in the other nostril using a new spray  Qty: 1 each, Refills: 0    Associated Diagnoses: Epigastric abdominal pain      ondansetron (ZOFRAN-ODT) 4 mg disintegrating tablet Take 1 tablet (4 mg total) by mouth every 6 (six) hours as needed for nausea for up to 3 days  Qty: 9 tablet, Refills: 0    Associated Diagnoses: Nausea; Vomiting      pantoprazole (PROTONIX) 40 mg tablet Take 1 tablet (40 mg total) by mouth daily  Qty: 30 tablet, Refills: 0    Associated Diagnoses: Epigastric abdominal pain      saccharomyces boulardii (FLORASTOR) 250 mg capsule Take 1 capsule (250 mg total) by mouth 2 (two) times a day  Qty: 20 capsule, Refills: 0    Associated Diagnoses: Pneumonia             No discharge procedures on file      PDMP Review       Value Time User    PDMP Reviewed  Yes 2/10/2023 12:40 PM Nisha Collins           ED Provider  Electronically Signed by           Melva Doshi DO  02/10/23 6648

## 2023-02-10 NOTE — ASSESSMENT & PLAN NOTE
Present on admission as evidenced by temperature 102 5, heart rate 140, WBC 20 22 with suspected source pneumonia as seen on imaging  Plan as outlined above

## 2023-02-10 NOTE — ASSESSMENT & PLAN NOTE
Patient has history of intermittently elevated LFTs  Denies alcohol use  Suspect may be reactionary to acute illness  Repeat CMP in AM   Serial abdominal exams

## 2023-02-10 NOTE — ED NOTES
Pt reports pain increased back to 8/10 md made aware  ST on monitor  , nausea resolved   IV fluids and abx infusing to left hand without difficulty, additional IV fluids to right ac infusing slowly as IV is positional     Angel Wynn RN  02/10/23 1499

## 2023-02-10 NOTE — ED NOTES
Sepsis protocol initiated md at bedside, blood cultures x 2 obtained, phlebotomy to left ac and IV access to left hand established      Rip ZACHARY Morris  02/10/23 2284

## 2023-02-10 NOTE — ASSESSMENT & PLAN NOTE
She has a history of iron deficiency anemia  We will continue patient's home medication of iron  Hemoglobin currently 10  6    Monitor

## 2023-02-10 NOTE — ED NOTES
Pt left ed in no distress, IV vanco infusing via pump without difficulty     Naif Granda, RN  02/10/23 5094

## 2023-02-10 NOTE — ASSESSMENT & PLAN NOTE
Patient had recently been hospitalized with bilateral pneumonia, treated with Unasyn  He was discharged on oral Augmentin which patient reports he was compliant and finished his course  Started to feel poorly day before presentation, felt feverish overnight took Tylenol with some water and threw up twice none bloody vomitus  Reports he was able to keep some water down this morning, continued with fever and chills  Presented to the ED for further evaluation and treatment  Patient was noted to have temp 102 5, heart rate 140, procalcitonin mildly elevated 0 95, lactic acid 1 0  ET of chest abdomen pelvis showed bilateral pulmonary consolidations replaced by groundglass opacities although in a larger extent, new pneumonia is possible and should be correlated clinically with new pulmonary symptomology as per radiology report  Did receive 3 L saline  Blood cultures obtained to follow-up on results  Started on Zosyn 4 5 g IV, tolerated in ED  Also received one-time dose of IV vancomycin  Continue with IV Zosyn, monitor    Pulmonary consulted

## 2023-02-10 NOTE — CONSULTS
Consultation - General Surgery   [place] 32 y o  male MRN: [id number]  Unit/Bed#: [address] [place] [address] Encounter: [id number]    Assessment/Plan     Assessment:  1) Nausea/Vomiting -uncertain of exact etiology of nausea and vomiting, patient does have proximal dilated loop of small bowel in left upper quadrant which does correlate with patient's pain however patient does also have significant J-pouch dilation and it is suspected that potentially he is having food/liquids travel beyond the point of the loop ileostomy, patient's nausea vomiting is currently controlled as current without NG tube he has had no acute exacerbations  2) J pouch Dilation -significant J-pouch dilation just proximal to the anastomotic staple line visualized on CT scan, patient historically does have anastomotic stricture with need for balloon dilation, patient does have a functioning loop ileostomy that is putting output of gas and liquids, no obvious distention on exam or tympany to percussion, no pain in the left lower quadrant, patient is still passing clear liquid through remnant of rectum on rare occasions but not frequently having any sort of bowel movements  3) Bilateral Pneumonia -  patient has CT exam findings consistent with that of consolidations with groundglass opacities  bilaterally, very mildly elevated Pro-Rubin as well as elevated leukocytosis, patient is having experience of chest tightness and coughing, concerns based on history of nausea and vomiting the possibility of aspiration sputum cultures pending    Plan:  1-3)   -Continue n p o  over the next 24 hours  -Serial abdominal exams  -We will consider CT scan of abdomen and pelvis with p o  contrast tomorrow provided no nausea and vomiting  -If patient has any sort of acute abdomen or hostile abdomen that is suggestive of peritonitis surgery team should be contacted immediately   - NG tube if patient is continuing to have any sort of recurrent nausea and vomiting  -We will discuss with GI team possibility of consult and endoscopic evaluation of pouch as well as E ferret limb of loop ileostomy  -Will eventually need to follow-up with [facility] surgical team in order to pursue the completion of his J-pouch revision continue IV antibiotics per the medicine team for empiric treatment of bilateral pneumonia  -With concerning possibility of aspiration would recommend also covering anaerobic species  -As needed analgesic regiment  -Reviewing care with everywhere chart  -Patient education  -Continue IV fluids with D5 half-normal saline 125 mL/h    History of Present Illness   HPI:  George Dye is a 32 y o  male with a past medical history pertinent for total colectomy in 2015, diagnosis of ulcerative colitis in 2012, C  difficile colitis in 2015, ileal J pouch with end-to-end anastomosis, significant balloon dilation of J-pouch anastomosis secondary to anastomotic strictures, prophylactic loop ileostomy in 2022 for preparation of revision of J-pouch in 2023, exploratory laparotomy and washout secondary to peritonitis in 2022 subsequently after loop ileostomy creation presenting to the acute care surgery team secondary to nausea/vomiting and J-pouch dilation  Patient was seen and examined in 7/6/2022 and was recommended to follow-up with surgical group from [facility]  Per patient reports he did follow-up with [facility] surgical team which was planning in optimizing the patient for J-pouch revision and loop ileostomy  Patient reports that he did have loop ileostomy performed but that it was brought with significant complication as patient had some type of leak around the ileostomy as well as peritonitis  Patient reports that he had an acute abdomen and required an expiratory laparotomy  Patient reports that he now has a functioning loop ileostomy that is not problematic  Patient was having the onset though of nausea, vomiting, chest tightness, cough  Patient denies any blood in his vomit    Patient denies any history of small bowel obstruction  Patient is still having ostomy output from his ileostomy  Patient is passing both gas and liquid stool  Patient denies any changes in the liquid stool such as black or blood  Patient is having abdominal pain but is in the upper left quadrant as compared to the lower left quadrant  Patient denies any distention  Patient is urinating/voiding without any issues or changes  Patient was told that he was suspected of having aspiration pneumonia but was uncertain  Patient is finding it hard because of the opioids that he received of differentiating the timeline on what was happening with regards to his respiratory-like symptoms versus his nausea vomiting symptoms  Patient seems to be implying that his nausea and vomiting was relatively acute in nature  Patient has not passing much if anything from his rectal remnant where his J-pouch is still connected with an end to end anastomosis but is strictured  Patient reports that on occasion intermittently he will pass more of a clear liquid but no stool  Patient in addition to his cough and chest tightness as well as difficulty breathing was having a febrile status yesterday into today which is what brought him in to the emergency department out of his fear of becoming more ill  Patient does plan in April 2023 to have a revision of his J-pouch and anastomosis  Patient is no longer on any sort of biologic agents due to his inherent risks with immunosuppression and recurrent infection in the past     Inpatient consult to Acute Care Surgery  Consult performed by: Maritza Elliott PA-C  Consult ordered by: [place] Carrillo          Review of Systems   Constitutional: Positive for appetite change and fever  Negative for chills  HENT: Negative for congestion and ear pain  Respiratory: Positive for cough and chest tightness  Negative for shortness of breath and wheezing      Cardiovascular: Negative for chest pain and palpitations  Gastrointestinal: Positive for abdominal pain, constipation, nausea and vomiting  Negative for abdominal distention, blood in stool and diarrhea  Genitourinary: Negative for difficulty urinating, dysuria, flank pain and hematuria  Musculoskeletal: Negative for arthralgias and gait problem  Skin: Negative for color change and wound  Neurological: Negative for dizziness, weakness and light-headedness  Psychiatric/Behavioral: Negative for agitation and confusion         Historical Information   Past Medical History:   Diagnosis Date   • Ankylosing spondylitis ([place] [id number] )    • Anxiety    • Bowel obstruction ([place] [address] )    • Clostridium difficile colitis 9/13/2018   • Colitis    • 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[place] [address] ) 9/13/2018   • Pancreatitis    • Ulcerative colitis ([place] [address] )      Past Surgical History:   Procedure Laterality Date   • COLECTOMY TOTAL      with ileal pouch and anastemosis   • IR PICC PLACEMENT SINGLE LUMEN  3/1/2022   • TOTAL COLECTOMY       Social History   Social History     Substance and Sexual Activity   Alcohol Use Not Currently    Comment: pt states 5 a month/socially     Social History     Substance and Sexual Activity   Drug Use No     E-Cigarette/Vaping   • E-Cigarette Use Never User      E-Cigarette/Vaping Substances   • Nicotine No    • THC No    • CBD No    • Flavoring No    • Other No    • Unknown No      Social History     Tobacco Use   Smoking Status Never   Smokeless Tobacco Never     Family History: non-contributory    Meds/Allergies   all current active meds have been reviewed and current meds:   Current Facility-Administered Medications   Medication Dose Route Frequency   • acetaminophen (TYLENOL) tablet 650 mg  650 mg Oral Q6H PRN   • benzonatate (TESSALON PERLES) capsule 100 mg  100 mg Oral TID PRN   • DULoxetine (CYMBALTA) delayed release capsule 60 mg  60 mg Oral Daily   • enoxaparin (LOVENOX) subcutaneous injection 40 mg  40 mg Subcutaneous Daily   • [START ON 2/11/2023] ferrous sulfate tablet 325 mg  325 mg Oral Daily With Breakfast   • HYDROmorphone (DILAUDID) injection 1 mg  1 mg Intravenous Q4H PRN   • ondansetron (ZOFRAN-ODT) dispersible tablet 4 mg  4 mg Oral Q6H PRN   • pantoprazole (PROTONIX) EC tablet 40 mg  40 mg Oral Daily   • piperacillin-tazobactam (ZOSYN) 4 5 g in sodium chloride 0 9 % 100 mL IVPB  4 5 g Intravenous Q6H   • saccharomyces boulardii (FLORASTOR) capsule 250 mg  250 mg Oral BID   • sodium chloride 0 9 % infusion  100 mL/hr Intravenous Continuous     Allergies   Allergen Reactions   • Cefazolin Hives     Other reaction(s): Arrythmia (Abnormal Heartbeat), Rash Maculopapular   • Mesalamine GI Intolerance     Other reaction(s): Pancreatitis  Annotation - [id number]: pancreatitis   • Wound Dressings Rash     Coloplast ostomy Products        Objective   First Vitals:   Blood Pressure: 121/63 (02/10/23 0844)  Pulse: (!) 140 (02/10/23 0844)  Temperature: (!) 102 5 °F (39 2 °C) (02/10/23 0844)  Temp Source: Oral (02/10/23 0844)  Respirations: 19 (02/10/23 0844)  Height: 5' 9" (175 3 cm) (02/10/23 0844)  Weight - Scale: 81 6 kg (180 lb) (02/10/23 0844)  SpO2: 95 % (02/10/23 0844)    Current Vitals:   Blood Pressure: 133/77 (02/10/23 1228)  Pulse: 90 (02/10/23 1312)  Temperature: 98 1 °F (36 7 °C) (02/10/23 1228)  Temp Source: Oral (02/10/23 1228)  Respirations: 22 (02/10/23 1312)  Height: 5' 9" (175 3 cm) (02/10/23 1228)  Weight - Scale: 85 6 kg (188 lb 11 4 oz) (02/10/23 1228)  SpO2: 94 % (02/10/23 1312)      Intake/Output Summary (Last 24 hours) at 2/10/2023 1458  Last data filed at 2/10/2023 1131  Gross per 24 hour   Intake 3100 ml   Output --   Net 3100 ml       Invasive Devices     Peripheral Intravenous Line  Duration           Peripheral IV 02/10/23 Left Hand <1 day    Peripheral IV 02/10/23 Right Antecubital <1 day          Drain  Duration           Ileostomy Continent (Abdominal pouch) RLQ 81 days                Physical Exam  Constitutional:       General: He is not in acute distress  Appearance: Normal appearance  He is normal weight  He is ill-appearing  He is not toxic-appearing or diaphoretic  Interventions: He is not intubated  HENT:      Head: Normocephalic and atraumatic  Right Ear: Hearing and external ear normal       Left Ear: Hearing and external ear normal       Nose: Nose normal    Cardiovascular:      Rate and Rhythm: Normal rate and regular rhythm  Heart sounds: S1 normal and S2 normal  Heart sounds are distant  No murmur heard  Pulmonary:      Effort: Pulmonary effort is normal  No tachypnea, bradypnea, accessory muscle usage, prolonged expiration, respiratory distress or retractions  He is not intubated  Breath sounds: No stridor, decreased air movement or transmitted upper airway sounds  Wheezing and rhonchi present  No decreased breath sounds or rales  Abdominal:      General: Abdomen is flat  Bowel sounds are normal  There is no distension  Palpations: Abdomen is soft  Tenderness: There is abdominal tenderness in the left upper quadrant  There is no guarding or rebound  Hernia: No hernia is present  Skin:         Neurological:      Mental Status: He is alert  Psychiatric:         Behavior: Behavior is cooperative  Lab Results:   I have personally reviewed pertinent lab results    , CBC:   Lab Results   Component Value Date    WBC 20 22 (H) 02/10/2023    HGB 10 6 (L) 02/10/2023    HCT 35 0 (L) 02/10/2023    MCV 63 (L) 02/10/2023     02/10/2023    MCH 19 0 (L) 02/10/2023    MCHC 30 3 (L) 02/10/2023    RDW 17 3 (H) 02/10/2023    MPV 8 5 (L) 02/10/2023    NRBC 0 02/10/2023   , CMP:   Lab Results   Component Value Date    SODIUM 134 (L) 02/10/2023    K 4 5 02/10/2023    CL 99 02/10/2023    CO2 26 02/10/2023    BUN 10 02/10/2023    CREATININE 0 96 02/10/2023    CALCIUM 8 5 02/10/2023    AST 70 (H) 02/10/2023    ALT 80 (H) 02/10/2023    ALKPHOS 130 (H) 02/10/2023    EGFR 106 02/10/2023 Imaging: I have personally reviewed pertinent reports  EKG, Pathology, and Other Studies: I have personally reviewed pertinent reports  Counseling / Coordination of Care  Total floor / unit time spent today 55 minutes  Greater than 50% of total time was spent with the patient and / or family counseling and / or coordination of care  A description of the counseling / coordination of care: Physical exam, history taking, reviewing labs, reviewing imaging, patient education

## 2023-02-10 NOTE — ED NOTES
MD aware of vitals and BP trends   Pt has approx 600ml left of third liter to infuse     Jing Garrison RN  02/10/23 1103

## 2023-02-10 NOTE — H&P
Stephanie 47 1995, 32 y o  male MRN: [id number]  Unit/Bed#: [address] [place] [address] Encounter: [id number]  Primary Care Provider: Joey Santiago DO   Date and time admitted to hospital: 2/10/2023  8:42 AM    * Pneumonia of both lower lobes due to infectious organism  Assessment & Plan  Patient had recently been hospitalized with bilateral pneumonia, treated with Unasyn  He was discharged on oral Augmentin which patient reports he was compliant and finished his course  Started to feel poorly day before presentation, felt feverish overnight took Tylenol with some water and threw up twice none bloody vomitus  Reports he was able to keep some water down this morning, continued with fever and chills  Presented to the ED for further evaluation and treatment  Patient was noted to have temp 102 5, heart rate 140, procalcitonin mildly elevated 0 95, lactic acid 1 0  ET of chest abdomen pelvis showed bilateral pulmonary consolidations replaced by groundglass opacities although in a larger extent, new pneumonia is possible and should be correlated clinically with new pulmonary symptomology as per radiology report  Did receive 3 L saline  Blood cultures obtained to follow-up on results  Started on Zosyn 4 5 g IV, tolerated in ED  Also received one-time dose of IV vancomycin  Continue with IV Zosyn, monitor  Pulmonary consulted      Sepsis [facility])  Assessment & Plan  Present on admission as evidenced by temperature 102 5, heart rate 140, WBC 20 22 with suspected source pneumonia as seen on imaging  Plan as outlined above    History of ulcerative colitis  Assessment & Plan  Patient has history of ulcerative colitis, follows [facility] and outpatient GI  As noted above CT did demonstrate some abnormalities  Surgery consulted, follow-up on recommendations  Reports nonbloody stools in ileostomy  Abdominal pain  Assessment & Plan  Patient reports abdominal pain, chronic    History of ulcerative colitis with multiple extensive abdominal surgeries, creation of a J-pouch and ileostomy  Follows with [facility], reports that he does have surgery planned for April for revision  CT chest abdomen pelvis did show a few prominent small bowel loops seen without significant zone transition to suggest obstruction at this time, more distention of the J-pouch with continued mucosal enhancement noted, surgical assessment is advised given the variable distention of the segment prior scans as per radiology report  Acute surgery consulted, follow-up on recommendations  As needed Dilaudid for severe pain  Ileostomy functioning, liquid brown stool seen in collection pouch  Serial abdominal exams    Elevated LFTs  Assessment & Plan  Patient has history of intermittently elevated LFTs  Denies alcohol use  Suspect may be reactionary to acute illness  Repeat CMP in AM   Serial abdominal exams  Anemia  Assessment & Plan  She has a history of iron deficiency anemia  We will continue patient's home medication of iron  Hemoglobin currently 10  6  Monitor    Ankylosing spondylitis [facility])  Assessment & Plan  Patient has a history of ankylosing spondylitis  Follows outpatient PCP      VTE Pharmacologic Prophylaxis: VTE Score: 4 Moderate Risk (Score 3-4) - Pharmacological DVT Prophylaxis Ordered: enoxaparin (Lovenox)  Code Status: Level 1 - Full Code   Discussion with family: patient will call family   Anticipated Length of Stay: Patient will be admitted on an inpatient basis with an anticipated length of stay of greater than 2 midnights secondary to pneumonia, iv abx, repeat labs, pulm and surgery consult (j pouch)  Total Time for Visit, including Counseling / Coordination of Care: 70 minutes Greater than 50% of this total time spent on direct patient counseling and coordination of care      Chief Complaint: vomiting, fever, chills, weakness     History of Present Illness:  Rafia Webster is a 32 y o  male with a PMH of pneumonia 2 weeks ago hospitalized and treated with Unasyn with transition to oral Augmentin on discharge which patient completed, recurrent strictures, small bowel obstruction, pouchitis, pouch dysfunction, multiple abdominal surgeries, ankylosing spondylitis, bowel obstruction, ulcerative colitis and positive ileostomy who presents with vomiting, fever, chills and weakness  Patient reports that the day prior to presentation he was at work and was not feeling right, stated he felt weak  He woke up around 230 this morning and felt feverish with chills, took 2 Tylenol with water and then proceeded to vomit twice nonbloody vomitus  He attempted to sleep again got up around 7 AM and continued to feel very poorly  At that time he decided to present to the emergency department for further evaluation and treatment  In the ED patient was noted to have temperature 102 5, heart rate 140, respirations 19, blood pressure 121/63 and 95%  Pro-Rubin was noted to be 0 95  WBCs elevated 20 22  AST 70, ALT 80, alk phos 130 and total bili 1 12   COVID test was performed which was negative  CT of chest abdomen pelvis was performed which showed bilateral pulmonary consolidations that are now replaced by groundglass opacities although in a larger extent, new pneumonia is possible, a few prominent small bowel loops are seen without significant stone transition to suggest obstruction at this time, more distention of the J-pouch with continued mucosal enhancement noted as per radiology report  Patient did receive 3 L of normal saline, started on IV Zosyn 4 5 g and one-time dose of IV vancomycin  We will continue with Zosyn 4 5 g every 6 hours at this time  Follow-up on blood cultures  Urine antigens sent; sputum gram stain and cultures ordered  IS and acapella valve ordered  Consulting pulmonary as patient has recurrent pneumonia, treating as HCAP  Surgery is also consulted for findings on CT scan regarding J-pouch  Will also give patient IV hydration as he does appear dry on exam   We will start with clear liquid diet and monitor tolerance  This was discussed with the patient at the bedside, he verbalized understanding and is agreeable to the plan  Patient is to be a full code as per discussion       Review of Systems:  Review of Systems   Constitutional: Positive for activity change, chills, fatigue and fever  HENT: Negative  Eyes: Negative  Respiratory: Positive for cough  Negative for shortness of breath and wheezing  Cardiovascular: Negative  Gastrointestinal: Positive for abdominal pain, nausea and vomiting  Has ileostomy present    Endocrine: Negative  Genitourinary: Negative  Musculoskeletal: Negative  Skin: Negative  Allergic/Immunologic: Negative  Neurological: Positive for dizziness and headaches  Hematological: Negative  Psychiatric/Behavioral: Negative  Past Medical and Surgical History:   Past Medical History:   Diagnosis Date   • Ankylosing spondylitis ([facility] )    • Anxiety    • Bowel obstruction ([facility] )    • Clostridium difficile colitis 9/13/2018   • Colitis    • 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[facility] ) 9/13/2018   • Pancreatitis    • Ulcerative colitis ([place] [address] )        Past Surgical History:   Procedure Laterality Date   • COLECTOMY TOTAL      with ileal pouch and anastemosis   • IR PICC PLACEMENT SINGLE LUMEN  3/1/2022   • TOTAL COLECTOMY         Meds/Allergies:  Prior to Admission medications    Medication Sig Start Date End Date Taking?  Authorizing Provider   acetaminophen (TYLENOL) 325 mg tablet Take 2 tablets (650 mg total) by mouth every 6 (six) hours as needed for mild pain, headaches or fever 1/24/23   Darlin Martinez,    ALPRAZolam (XANAX) 0 5 mg tablet 1-2 po q8hrs prn anxiety 2/7/23   Jak Bashir DO   DULoxetine (CYMBALTA) 60 mg delayed release capsule Take 1 capsule (60 mg total) by mouth daily 2/7/23   Jak Bashir DO   ferrous sulfate 324 (65 Fe) mg Take 1 tablet (324 mg total) by mouth daily before breakfast 1/24/23   Darlin Martinez DO   naloxone (NARCAN) 4 mg/0 1 mL nasal spray Administer 1 spray into a nostril  If no response after 2-3 minutes, give another dose in the other nostril using a new spray  Patient not taking: Reported on 2/7/2023 1/24/23   Darlin Martinez DO   ondansetron (ZOFRAN-ODT) 4 mg disintegrating tablet Take 1 tablet (4 mg total) by mouth every 6 (six) hours as needed for nausea for up to 3 days 1/18/23 2/7/23  Cassieaubree King DO   pantoprazole (PROTONIX) 40 mg tablet Take 1 tablet (40 mg total) by mouth daily 1/24/23 2/23/23  Darlin Martinez DO   saccharomyces boulardii (FLORASTOR) 250 mg capsule Take 1 capsule (250 mg total) by mouth 2 (two) times a day 1/28/23   Darlin Martinez DO     I have reviewed home medications using recent Epic encounter  Allergies: Allergies   Allergen Reactions   • Cefazolin Hives     Other reaction(s): Arrythmia (Abnormal Heartbeat), Rash Maculopapular   • Mesalamine GI Intolerance     Other reaction(s): Pancreatitis  [facility] - [id number]: pancreatitis   • Wound Dressings Rash     [employer] ostomy Products        Social History:  Marital Status: Single   Occupation:   Patient Pre-hospital Living Situation: Home, With other family member: Sister and brother-in-law  Patient Pre-hospital Level of Mobility: walks  Patient Pre-hospital Diet Restrictions: None  Substance Use History:   Social History     Substance and Sexual Activity   Alcohol Use Not Currently    Comment: pt states 5 a month/socially     Social History     Tobacco Use   Smoking Status Never   Smokeless Tobacco Never     Social History     Substance and Sexual Activity   Drug Use No       Family History:  History reviewed  No pertinent family history      Physical Exam:     Vitals:   Blood Pressure: 133/77 (02/10/23 1228)  Pulse: 102 (02/10/23 1228)  Temperature: 98 1 °F (36 7 °C) (02/10/23 1228)  Temp Source: Oral (02/10/23 1228)  Respirations: (!) 32 (02/10/23 1200)  Height: 5' 9" (175 3 cm) (02/10/23 1228)  Weight - Scale: 85 6 kg (188 lb 11 4 oz) (02/10/23 1228)  SpO2: 94 % (02/10/23 1228)    Physical Exam  Vitals reviewed  Constitutional:       Appearance: He is ill-appearing  HENT:      Head: Normocephalic  Nose: Nose normal       Mouth/Throat:      Mouth: Mucous membranes are dry  Eyes:      Extraocular Movements: Extraocular movements intact  Conjunctiva/sclera: Conjunctivae normal       Pupils: Pupils are equal, round, and reactive to light  Cardiovascular:      Rate and Rhythm: Normal rate and regular rhythm  Pulses: Normal pulses  Heart sounds: Normal heart sounds  Pulmonary:      Effort: Pulmonary effort is normal       Comments: Diminished at bases   Abdominal:      General: Bowel sounds are normal       Comments: Ileostomy present, draining liquid brown stool   Genitourinary:     Comments: Voiding spontaneously   Musculoskeletal:         General: Normal range of motion  Cervical back: Normal range of motion  Right lower leg: No edema  Left lower leg: No edema  Skin:     General: Skin is warm and dry  Capillary Refill: Capillary refill takes less than 2 seconds  Neurological:      General: No focal deficit present  Mental Status: He is oriented to person, place, and time  Psychiatric:         Mood and Affect: Mood normal          Behavior: Behavior normal          Thought Content:  Thought content normal          Judgment: Judgment normal          Additional Data:     Lab Results:  Results from last 7 days   Lab Units 02/10/23  0901   WBC Thousand/uL 20 22*   HEMOGLOBIN g/dL 10 6*   HEMATOCRIT % 35 0*   PLATELETS Thousands/uL 349   NEUTROS PCT % 85*   LYMPHS PCT % 6*   MONOS PCT % 7   EOS PCT % 1     Results from last 7 days   Lab Units 02/10/23  0901   SODIUM mmol/L 134*   POTASSIUM mmol/L 4 5   CHLORIDE mmol/L 99   CO2 mmol/L 26   BUN mg/dL 10   CREATININE mg/dL 0  96   ANION GAP mmol/L 9   CALCIUM mg/dL 8 5   ALBUMIN g/dL 3 5   TOTAL BILIRUBIN mg/dL 1 12*   ALK PHOS U/L 130*   ALT U/L 80*   AST U/L 70*   GLUCOSE RANDOM mg/dL 88     Results from last 7 days   Lab Units 02/10/23  0901   INR  0 98             Results from last 7 days   Lab Units 02/10/23  0901   LACTIC ACID mmol/L 1 0   PROCALCITONIN ng/ml 0 95*       Lines/Drains:  Invasive Devices     Peripheral Intravenous Line  Duration           Peripheral IV 02/10/23 Left Hand <1 day    Peripheral IV 02/10/23 Right Antecubital <1 day          Drain  Duration           Ileostomy Continent (Abdominal pouch) RLQ 81 days                    Imaging: Reviewed radiology reports from this admission including: chest CT scan and abdominal/pelvic CT  CT chest abdomen pelvis w contrast   Final Result by Prince Calin MD (02/10 1054)      Bilateral pulmonary consolidations are now replaced by groundglass opacities although in a larger extent  Although these may represent resolution of prior consolidation, a new pneumonia is possible and should be correlated clinically with new pulmonary    symptomatology  A few prominent small bowel loops are seen but without significant zone of transition to suggest obstruction at this time  Follow-up may be useful  There is more distention of the J-pouch with continued mucosal enhancement  Surgical assessment is advised given the variable distention of this segment prior scans  This was discussed with Dr Janace Halsted at 10:50 AM            Workstation performed: [id number]             EKG and Other Studies Reviewed on Admission:   · EKG: NSR  HR 90s  ** Please Note: This note has been constructed using a voice recognition system   **

## 2023-02-10 NOTE — ED NOTES
Pt returned to room reports pain decreased to 61/0 nausea resolved   ST on monitor 114, resp effort improved      Aldo Webb, RN  02/10/23 0669

## 2023-02-10 NOTE — ASSESSMENT & PLAN NOTE
Patient reports abdominal pain, chronic  History of ulcerative colitis with multiple extensive abdominal surgeries, creation of a J-pouch and ileostomy  Follows with [facility], reports that he does have surgery planned for April for revision  CT chest abdomen pelvis did show a few prominent small bowel loops seen without significant zone transition to suggest obstruction at this time, more distention of the J-pouch with continued mucosal enhancement noted, surgical assessment is advised given the variable distention of the segment prior scans as per radiology report  Acute surgery consulted, follow-up on recommendations  As needed Dilaudid for severe pain  Ileostomy functioning, liquid brown stool seen in collection pouch    Serial abdominal exams

## 2023-02-10 NOTE — ASSESSMENT & PLAN NOTE
Patient has history of ulcerative colitis, follows [facility] and outpatient GI  As noted above CT did demonstrate some abnormalities  Surgery consulted, follow-up on recommendations  Reports nonbloody stools in ileostomy

## 2023-02-11 LAB
ALBUMIN SERPL BCP-MCNC: 3 G/DL (ref 3.5–5)
ALP SERPL-CCNC: 117 U/L (ref 46–116)
ALT SERPL W P-5'-P-CCNC: 48 U/L (ref 12–78)
ANION GAP SERPL CALCULATED.3IONS-SCNC: 8 MMOL/L (ref 4–13)
AST SERPL W P-5'-P-CCNC: 28 U/L (ref 5–45)
BILIRUB SERPL-MCNC: 0.68 MG/DL (ref 0.2–1)
BUN SERPL-MCNC: 5 MG/DL (ref 5–25)
CALCIUM ALBUM COR SERPL-MCNC: 9.2 MG/DL (ref 8.3–10.1)
CALCIUM SERPL-MCNC: 8.4 MG/DL (ref 8.3–10.1)
CHLORIDE SERPL-SCNC: 102 MMOL/L (ref 96–108)
CO2 SERPL-SCNC: 28 MMOL/L (ref 21–32)
CREAT SERPL-MCNC: 0.9 MG/DL (ref 0.6–1.3)
ERYTHROCYTE [DISTWIDTH] IN BLOOD BY AUTOMATED COUNT: 16.6 % (ref 11.6–15.1)
GFR SERPL CREATININE-BSD FRML MDRD: 115 ML/MIN/1.73SQ M
GLUCOSE SERPL-MCNC: 57 MG/DL (ref 65–140)
HCT VFR BLD AUTO: 29.9 % (ref 36.5–49.3)
HGB BLD-MCNC: 8.7 G/DL (ref 12–17)
L PNEUMO1 AG UR QL IA.RAPID: NEGATIVE
MAGNESIUM SERPL-MCNC: 1.9 MG/DL (ref 1.6–2.6)
MCH RBC QN AUTO: 18.9 PG (ref 26.8–34.3)
MCHC RBC AUTO-ENTMCNC: 29.1 G/DL (ref 31.4–37.4)
MCV RBC AUTO: 65 FL (ref 82–98)
MRSA NOSE QL CULT: NORMAL
PLATELET # BLD AUTO: 294 THOUSANDS/UL (ref 149–390)
PMV BLD AUTO: 9.3 FL (ref 8.9–12.7)
POTASSIUM SERPL-SCNC: 3.8 MMOL/L (ref 3.5–5.3)
PROT SERPL-MCNC: 6.2 G/DL (ref 6.4–8.4)
RBC # BLD AUTO: 4.6 MILLION/UL (ref 3.88–5.62)
S PNEUM AG UR QL: NEGATIVE
SODIUM SERPL-SCNC: 138 MMOL/L (ref 135–147)
WBC # BLD AUTO: 11.52 THOUSAND/UL (ref 4.31–10.16)

## 2023-02-11 RX ORDER — ONDANSETRON 2 MG/ML
4 INJECTION INTRAMUSCULAR; INTRAVENOUS EVERY 6 HOURS PRN
Status: DISCONTINUED | OUTPATIENT
Start: 2023-02-11 | End: 2023-02-14 | Stop reason: HOSPADM

## 2023-02-11 RX ADMIN — ONDANSETRON 4 MG: 2 INJECTION INTRAMUSCULAR; INTRAVENOUS at 21:19

## 2023-02-11 RX ADMIN — HYDROMORPHONE HYDROCHLORIDE 1 MG: 1 INJECTION, SOLUTION INTRAMUSCULAR; INTRAVENOUS; SUBCUTANEOUS at 21:18

## 2023-02-11 RX ADMIN — Medication 250 MG: at 09:28

## 2023-02-11 RX ADMIN — HYDROMORPHONE HYDROCHLORIDE 1 MG: 1 INJECTION, SOLUTION INTRAMUSCULAR; INTRAVENOUS; SUBCUTANEOUS at 11:58

## 2023-02-11 RX ADMIN — ONDANSETRON 4 MG: 2 INJECTION INTRAMUSCULAR; INTRAVENOUS at 03:51

## 2023-02-11 RX ADMIN — ONDANSETRON 4 MG: 2 INJECTION INTRAMUSCULAR; INTRAVENOUS at 11:58

## 2023-02-11 RX ADMIN — ACETAMINOPHEN 650 MG: 325 TABLET, FILM COATED ORAL at 00:18

## 2023-02-11 RX ADMIN — DEXTROSE AND SODIUM CHLORIDE 125 ML/HR: 5; .45 INJECTION, SOLUTION INTRAVENOUS at 06:20

## 2023-02-11 RX ADMIN — HYDROMORPHONE HYDROCHLORIDE 1 MG: 1 INJECTION, SOLUTION INTRAMUSCULAR; INTRAVENOUS; SUBCUTANEOUS at 15:04

## 2023-02-11 RX ADMIN — PIPERACILLIN SODIUM AND TAZOBACTAM SODIUM 4.5 G: 4; .5 INJECTION, POWDER, LYOPHILIZED, FOR SOLUTION INTRAVENOUS at 21:19

## 2023-02-11 RX ADMIN — HYDROMORPHONE HYDROCHLORIDE 1 MG: 1 INJECTION, SOLUTION INTRAMUSCULAR; INTRAVENOUS; SUBCUTANEOUS at 07:54

## 2023-02-11 RX ADMIN — HYDROMORPHONE HYDROCHLORIDE 1 MG: 1 INJECTION, SOLUTION INTRAMUSCULAR; INTRAVENOUS; SUBCUTANEOUS at 00:15

## 2023-02-11 RX ADMIN — FERROUS SULFATE TAB 325 MG (65 MG ELEMENTAL FE) 325 MG: 325 (65 FE) TAB at 07:54

## 2023-02-11 RX ADMIN — PANTOPRAZOLE SODIUM 40 MG: 40 TABLET, DELAYED RELEASE ORAL at 09:28

## 2023-02-11 RX ADMIN — PIPERACILLIN SODIUM AND TAZOBACTAM SODIUM 4.5 G: 4; .5 INJECTION, POWDER, LYOPHILIZED, FOR SOLUTION INTRAVENOUS at 15:03

## 2023-02-11 RX ADMIN — DEXTROSE AND SODIUM CHLORIDE 125 ML/HR: 5; .45 INJECTION, SOLUTION INTRAVENOUS at 19:54

## 2023-02-11 RX ADMIN — DULOXETINE HYDROCHLORIDE 60 MG: 60 CAPSULE, DELAYED RELEASE ORAL at 18:20

## 2023-02-11 RX ADMIN — PIPERACILLIN SODIUM AND TAZOBACTAM SODIUM 4.5 G: 4; .5 INJECTION, POWDER, LYOPHILIZED, FOR SOLUTION INTRAVENOUS at 09:28

## 2023-02-11 RX ADMIN — PIPERACILLIN SODIUM AND TAZOBACTAM SODIUM 4.5 G: 4; .5 INJECTION, POWDER, LYOPHILIZED, FOR SOLUTION INTRAVENOUS at 03:52

## 2023-02-11 RX ADMIN — DULOXETINE HYDROCHLORIDE 60 MG: 60 CAPSULE, DELAYED RELEASE ORAL at 09:28

## 2023-02-11 RX ADMIN — HYDROMORPHONE HYDROCHLORIDE 1 MG: 1 INJECTION, SOLUTION INTRAMUSCULAR; INTRAVENOUS; SUBCUTANEOUS at 03:51

## 2023-02-11 RX ADMIN — Medication 250 MG: at 18:20

## 2023-02-11 RX ADMIN — HYDROMORPHONE HYDROCHLORIDE 1 MG: 1 INJECTION, SOLUTION INTRAMUSCULAR; INTRAVENOUS; SUBCUTANEOUS at 17:28

## 2023-02-11 NOTE — PROGRESS NOTES
Julian 43  Progress Note - Jai Ar 1995, 32 y o  male MRN: [id number]  Unit/Bed#: 2 South 202 Encounter: [id number]  Primary Care Provider: Corina Garcia DO   Date and time admitted to hospital: 2/10/2023  8:42 AM    * Pneumonia of both lower lobes due to infectious organism  Assessment & Plan  Patient had recently been hospitalized with bilateral pneumonia, treated with Unasyn  He was discharged on oral Augmentin which patient reports he was compliant and finished his course  Started to feel poorly day before presentation, felt feverish overnight took Tylenol with some water and threw up twice none bloody vomitus  Reports he was able to keep some water down this morning, continued with fever and chills  Presented to the ED for further evaluation and treatment  Patient was noted to have temp 102 5, heart rate 140, procalcitonin mildly elevated 0 95, lactic acid 1 0  ET of chest abdomen pelvis showed bilateral pulmonary consolidations replaced by groundglass opacities although in a larger extent, new pneumonia is possible and should be correlated clinically with new pulmonary symptomology as per radiology report  Did receive 3 L saline  Blood cultures obtained to follow-up on results  Started on Zosyn 4 5 g IV, tolerated in ED  Also received one-time dose of IV vancomycin  Continue with IV Zosyn, monitor  Pulmonary consulted; feel that the imaging is showing improvement; pneumonitis vs pneumonia vs GI source      Sepsis [facility])  Assessment & Plan  Present on admission as evidenced by temperature 102 5, heart rate 140, WBC 20 22 with suspected source leaning more toward GI source as pulmonary indicated imaging actually showing improvement in regards to pneumonia  As per discussion with surgery team will continue with IV Zosyn; Patient still had nausea, received Zofran IV around noon today; hold off clears at this time     Continue IVF for hydration   WBC decreased to 11 52  Blood cultures negative to date  History of ulcerative colitis  Assessment & Plan  Patient has history of ulcerative colitis, follows [facility] and outpatient GI  As noted above CT did demonstrate some abnormalities  Surgery consulted, recommendations noted above  Reports nonbloody stools in ileostomy  Abdominal pain  Assessment & Plan  Patient reports abdominal pain, chronic  History of ulcerative colitis with multiple extensive abdominal surgeries, creation of a J-pouch and ileostomy  Follows with [facility], reports that he does have surgery planned for April for revision  CT chest abdomen pelvis did show a few prominent small bowel loops seen without significant zone transition to suggest obstruction at this time, more distention of the J-pouch with continued mucosal enhancement noted, surgical assessment is advised given the variable distention of the segment prior scans as per radiology report  Acute surgery consulted, indicated that they will refer patient to his surgeon at [facility]  Indicated that they discussed this with the patient in detail and the patient does not wish to be transferred from [facility] to [facility] inpatient admission would prefer to continue with antibiotics here and advance diet, arrange his own appointment and will go to see his surgeon outpatient  Continue IV Zosyn at this time    As needed Dilaudid for severe pain  Ileostomy functioning, liquid brown stool seen in collection pouch  Serial abdominal exams    Elevated LFTs  Assessment & Plan  Patient has history of intermittently elevated LFTs  Denies alcohol use  Suspect may be reactionary to acute illness  Repeat CMP in AM; trending down  Serial abdominal exams  Anemia  Assessment & Plan  Patient has a history of iron deficiency anemia  We will continue patient's home medication of iron  Hemoglobin currently 8 7, suspect dilutional as patient has no overt signs of bleeding and is receiving IV hydration    Monitor    Ankylosing spondylitis [facility])  Assessment & Plan  Patient has a history of ankylosing spondylitis  Follows outpatient PCP          VTE Pharmacologic Prophylaxis: VTE Score: 4 Moderate Risk (Score 3-4) - Pharmacological DVT Prophylaxis Ordered: enoxaparin (Lovenox)  Patient Centered Rounds: I performed bedside rounds with nursing staff today  Discussions with Specialists or Other Care Team Provider: Surgery    Education and Discussions with Family / Patient: patient indicated he would call family   Time Spent for Care: 45 minutes  More than 50% of total time spent on counseling and coordination of care as described above  Current Length of Stay: 1 day(s)  Current Patient Status: Inpatient   Certification Statement: The patient will continue to require additional inpatient hospital stay due to IV abx, monitor serial abd exams, anti nausea meds, pain meds, repeat labs   Discharge Plan: Anticipate discharge in 48-72 hrs to possible transfer to [facility] vs home with outpatient follow up pending patient's progress     Code Status: Level 1 - Full Code    Subjective:   Patient seen sitting up in bed resting quietly  Reports that he just had taken some pain medication  Reports that his breathing is somewhat better today, continues with abdominal discomfort  Reports that the stool output from his ileostomy has increased, thinks that is most likely secondary to him being on antibiotics  Objective:     Vitals:   Temp (24hrs), Av 1 °F (36 7 °C), Min:97 4 °F (36 3 °C), Max:98 6 °F (37 °C)    Temp:  [97 4 °F (36 3 °C)-98 6 °F (37 °C)] 97 9 °F (36 6 °C)  HR:  [] 95  Resp:  [16-17] 16  BP: (114-121)/(61-73) 115/72  SpO2:  [95 %-96 %] 96 %  Body mass index is 27 87 kg/m²  Input and Output Summary (last 24 hours):      Intake/Output Summary (Last 24 hours) at 2023 1535  Last data filed at 2023 0993  Gross per 24 hour   Intake 1070 83 ml   Output --   Net 1070 83 ml       Physical Exam:   Physical Exam  Vitals and nursing note reviewed  Constitutional:       General: He is not in acute distress  HENT:      Head: Normocephalic  Nose: Nose normal       Mouth/Throat:      Mouth: Mucous membranes are moist    Eyes:      Extraocular Movements: Extraocular movements intact  Conjunctiva/sclera: Conjunctivae normal       Pupils: Pupils are equal, round, and reactive to light  Cardiovascular:      Rate and Rhythm: Normal rate and regular rhythm  Pulses: Normal pulses  Heart sounds: Normal heart sounds  Pulmonary:      Effort: Pulmonary effort is normal       Breath sounds: Normal breath sounds  Abdominal:      General: Bowel sounds are normal  There is no distension  Tenderness: There is abdominal tenderness  Comments: Ileostomy present, draining liquid stool    Genitourinary:     Comments: Voiding spontaneously   Musculoskeletal:         General: Normal range of motion  Cervical back: Normal range of motion  Right lower leg: No edema  Left lower leg: No edema  Skin:     General: Skin is warm and dry  Capillary Refill: Capillary refill takes less than 2 seconds  Neurological:      General: No focal deficit present  Mental Status: He is alert and oriented to person, place, and time  Psychiatric:         Mood and Affect: Mood normal          Behavior: Behavior normal          Thought Content:  Thought content normal          Judgment: Judgment normal           Additional Data:     Labs:  Results from last 7 days   Lab Units 02/11/23  0616 02/10/23  0901   WBC Thousand/uL 11 52* 20 22*   HEMOGLOBIN g/dL 8 7* 10 6*   HEMATOCRIT % 29 9* 35 0*   PLATELETS Thousands/uL 294 349   NEUTROS PCT %  --  85*   LYMPHS PCT %  --  6*   MONOS PCT %  --  7   EOS PCT %  --  1     Results from last 7 days   Lab Units 02/11/23  0616   SODIUM mmol/L 138   POTASSIUM mmol/L 3 8   CHLORIDE mmol/L 102   CO2 mmol/L 28   BUN mg/dL 5   CREATININE mg/dL 0 90   ANION GAP mmol/L 8   CALCIUM mg/dL 8 4   ALBUMIN g/dL 3 0*   TOTAL BILIRUBIN mg/dL 0 68   ALK PHOS U/L 117*   ALT U/L 48   AST U/L 28   GLUCOSE RANDOM mg/dL 57*     Results from last 7 days   Lab Units 02/10/23  09   INR  0 98             Results from last 7 days   Lab Units 02/10/23  0901   LACTIC ACID mmol/L 1 0   PROCALCITONIN ng/ml 0 95*       Lines/Drains:  Invasive Devices     Peripheral Intravenous Line  Duration           Peripheral IV 02/10/23 Left Hand 1 day    Peripheral IV 02/10/23 Right Antecubital 1 day          Drain  Duration           Ileostomy Continent (Abdominal pouch) RLQ 82 days                  Telemetry:  Telemetry Orders (From admission, onward)             24 Hour Telemetry Monitoring  Continuous x 24 Hours (Telem)           References:    Telemetry Guidelines   Question:  Reason for 24 Hour Telemetry  Answer:  Metabolic/Electrolyte Disturbance with High Probability of Dysrhythmia (K level <3 or >6, or KCL infusion >=10mEq/hr)                 Telemetry Reviewed: Normal Sinus Rhythm  Indication for Continued Telemetry Use: No indication for continued use  Will discontinue  Imaging: No pertinent imaging reviewed  Recent Cultures (last 7 days):   Results from last 7 days   Lab Units 02/10/23  2124 02/10/23  0901   BLOOD CULTURE   --  No Growth at 24 hrs  No Growth at 24 hrs     LEGIONELLA URINARY ANTIGEN  Negative  --        Last 24 Hours Medication List:   Current Facility-Administered Medications   Medication Dose Route Frequency Provider Last Rate   • acetaminophen  650 mg Oral Q6H PRN [place] Canela     • benzonatate  100 mg Oral TID PRN KY Canela     • dextrose 5 % and sodium chloride 0 45 %  125 mL/hr Intravenous Continuous KY Reyez 125 mL/hr (23 3609)   • DULoxetine  60 mg Oral BID KY Canela     • enoxaparin  40 mg Subcutaneous Daily KY Canela     • ferrous sulfate  325 mg Oral Daily With Breakfast KY Reina     • HYDROmorphone  1 mg Intravenous Q3H PRN KY Reina     • ondansetron  4 mg Intravenous Q6H PRN KY Reyez     • pantoprazole  40 mg Oral Daily KY Reina     • piperacillin-tazobactam  4 5 g Intravenous Q6H KY Reina 4 5 g (02/11/23 2173)   • saccharomyces boulardii  250 mg Oral BID KY Reina          Today, Patient Was Seen By: KY Reina    **Please Note: This note may have been constructed using a voice recognition system  **

## 2023-02-11 NOTE — ASSESSMENT & PLAN NOTE
Patient has a history of iron deficiency anemia  We will continue patient's home medication of iron  Hemoglobin currently 8 7, suspect dilutional as patient has no overt signs of bleeding and is receiving IV hydration    Monitor

## 2023-02-11 NOTE — ASSESSMENT & PLAN NOTE
Patient had recently been hospitalized with bilateral pneumonia, treated with Unasyn  He was discharged on oral Augmentin which patient reports he was compliant and finished his course  Started to feel poorly day before presentation, felt feverish overnight took Tylenol with some water and threw up twice none bloody vomitus  Reports he was able to keep some water down this morning, continued with fever and chills  Presented to the ED for further evaluation and treatment  Patient was noted to have temp 102 5, heart rate 140, procalcitonin mildly elevated 0 95, lactic acid 1 0  ET of chest abdomen pelvis showed bilateral pulmonary consolidations replaced by groundglass opacities although in a larger extent, new pneumonia is possible and should be correlated clinically with new pulmonary symptomology as per radiology report  Did receive 3 L saline  Blood cultures obtained to follow-up on results  Started on Zosyn 4 5 g IV, tolerated in ED  Also received one-time dose of IV vancomycin  Continue with IV Zosyn, monitor    Pulmonary consulted; feel that the imaging is showing improvement; pneumonitis vs pneumonia vs GI source

## 2023-02-11 NOTE — QUICK NOTE
Patient reports feeling better this afternoon, is requesting food  Will start clear liquid diet as outlined in Surgery note, and monitor tolerance

## 2023-02-11 NOTE — ASSESSMENT & PLAN NOTE
Patient reports abdominal pain, chronic  History of ulcerative colitis with multiple extensive abdominal surgeries, creation of a J-pouch and ileostomy  Follows with [facility], reports that he does have surgery planned for April for revision  CT chest abdomen pelvis did show a few prominent small bowel loops seen without significant zone transition to suggest obstruction at this time, more distention of the J-pouch with continued mucosal enhancement noted, surgical assessment is advised given the variable distention of the segment prior scans as per radiology report  Acute surgery consulted, indicated that they will refer patient to his surgeon at [facility]  Indicated that they discussed this with the patient in detail and the patient does not wish to be [facility] would prefer to continue with antibiotics here and advance diet, arrange his own appointment and will go to see his surgeon outpatient  Continue IV Zosyn at this time    As needed Dilaudid for severe pain  Ileostomy functioning, liquid brown stool seen in collection pouch    Serial abdominal exams

## 2023-02-11 NOTE — ASSESSMENT & PLAN NOTE
Patient has history of ulcerative colitis, follows [facility] and outpatient GI  As noted above CT did demonstrate some abnormalities  Surgery consulted, recommendations noted above  Reports nonbloody stools in ileostomy

## 2023-02-11 NOTE — PLAN OF CARE
Problem: RESPIRATORY - ADULT  Goal: Achieves optimal ventilation and oxygenation  Description: INTERVENTIONS:  - Assess for changes in respiratory status  - Assess for changes in mentation and behavior  - Position to facilitate oxygenation and minimize respiratory effort  - Oxygen administered by appropriate delivery if ordered  - Initiate smoking cessation education as indicated  - Encourage broncho-pulmonary hygiene including cough, deep breathe, Incentive Spirometry  - Assess the need for suctioning and aspirate as needed  - Assess and instruct to report SOB or any respiratory difficulty  - Respiratory Therapy support as indicated  Outcome: Progressing     Problem: PAIN - ADULT  Goal: Verbalizes/displays adequate comfort level or baseline comfort level  Description: Interventions:  - Encourage patient to monitor pain and request assistance  - Assess pain using appropriate pain scale  - Administer analgesics based on type and severity of pain and evaluate response  - Implement non-pharmacological measures as appropriate and evaluate response  - Consider cultural and social influences on pain and pain management  - Notify physician/advanced practitioner if interventions unsuccessful or patient reports new pain  Outcome: Progressing     Problem: INFECTION - ADULT  Goal: Absence or prevention of progression during hospitalization  Description: INTERVENTIONS:  - Assess and monitor for signs and symptoms of infection  - Monitor lab/diagnostic results  - Monitor all insertion sites, i e  indwelling lines, tubes, and drains  - Monitor endotracheal if appropriate and nasal secretions for changes in amount and color  - [place] appropriate cooling/warming therapies per order  - Administer medications as ordered  - Instruct and encourage patient and family to use good hand hygiene technique  - Identify and instruct in appropriate isolation precautions for identified infection/condition  Outcome: Progressing  Goal: Absence of fever/infection during neutropenic period  Description: INTERVENTIONS:  - Monitor WBC    Outcome: Progressing     Problem: DISCHARGE PLANNING  Goal: Discharge to home or other facility with appropriate resources  Description: INTERVENTIONS:  - Identify barriers to discharge w/patient and caregiver  - Arrange for needed discharge resources and transportation as appropriate  - Identify discharge learning needs (meds, wound care, etc )  - Arrange for interpretive services to assist at discharge as needed  - Refer to Case Management Department for coordinating discharge planning if the patient needs post-hospital services based on physician/advanced practitioner order or complex needs related to functional status, cognitive ability, or social support system  Outcome: Progressing     Problem: Knowledge Deficit  Goal: Patient/family/caregiver demonstrates understanding of disease process, treatment plan, medications, and discharge instructions  Description: Complete learning assessment and assess knowledge base    Interventions:  - Provide teaching at level of understanding  - Provide teaching via preferred learning methods  Outcome: Progressing     Problem: CARDIOVASCULAR - ADULT  Goal: Maintains optimal cardiac output and hemodynamic stability  Description: INTERVENTIONS:  - Monitor I/O, vital signs and rhythm  - Monitor for S/S and trends of decreased cardiac output  - Administer and titrate ordered vasoactive medications to optimize hemodynamic stability  - Assess quality of pulses, skin color and temperature  - Assess for signs of decreased coronary artery perfusion  - Instruct patient to report change in severity of symptoms  Outcome: Progressing  Goal: Absence of cardiac dysrhythmias or at baseline rhythm  Description: INTERVENTIONS:  - Continuous cardiac monitoring, vital signs, obtain 12 lead EKG if ordered  - Administer antiarrhythmic and heart rate control medications as ordered  - Monitor electrolytes and administer replacement therapy as ordered  Outcome: Progressing     Problem: GASTROINTESTINAL - ADULT  Goal: Minimal or absence of nausea and/or vomiting  Description: INTERVENTIONS:  - Administer IV fluids if ordered to ensure adequate hydration  - Maintain NPO status until nausea and vomiting are resolved  - Nasogastric tube if ordered  - Administer ordered antiemetic medications as needed  - Provide nonpharmacologic comfort measures as appropriate  - Advance diet as tolerated, if ordered  - Consider nutrition services referral to assist patient with adequate nutrition and appropriate food choices  Outcome: Progressing  Goal: Establish and maintain optimal ostomy function  Description: INTERVENTIONS:  - Assess bowel function  - Encourage oral fluids to ensure adequate hydration  - Administer IV fluids if ordered to ensure adequate hydration   - Administer ordered medications as needed  - Encourage mobilization and activity  - Nutrition services referral to assist patient with appropriate food choices  - Assess stoma site  - Consider wound care consult   Outcome: Progressing

## 2023-02-11 NOTE — PROGRESS NOTES
Progress Note - Pulmonary   Mitali Pore 32 y o  male MRN: [id number]  Unit/Bed#: 2 South 202 Encounter: [id number]    Assessment/Plan:    Abnormal CT chest with patchy bibasilar alveolar/groundglass opacities with recent aspiration pneumonia              Status post course of Unasyn/Augmentin 2 weeks ago  Dense consolidation has resolved and now has opacities possibly consistent with resolution of prior pneumonia plus minus pneumonitis  Plan for sepsis and antibiotics as listed below  Repeat imaging pending course      Sepsis present on admission              Pneumonitis versus early pneumonia versus GI etiology  General surgery is consulted due to distended J-pouch and continued mucosal enhancement  Continue with Zosyn and follow procalcitonin/culture data  Monitor temperatures and WBCs      Ulcerative colitis with abdominal pain/vomiting/ileostomy              Surgery consult noted      Outpatient follow-up pending course  Discussed with primary team     Chief Complaint:    "A little bit better"    Subjective:    José Miguel Doll is resting in bed  He reports he feels a little bit better today  Still nauseated with some pain  No shortness of breath or chest pain  He has a cough with scant mucus, but nothing near his cough when he had pneumonia 2 weeks ago  Objective:    Vitals: Blood pressure 119/73, pulse 84, temperature (!) 97 4 °F (36 3 °C), temperature source Oral, resp  rate 17, height 5' 9" (1 753 m), weight 85 6 kg (188 lb 11 4 oz), SpO2 96 %  RA,Body mass index is 27 87 kg/m²        Intake/Output Summary (Last 24 hours) at 2/11/2023 1030  Last data filed at 2/11/2023 2150  Gross per 24 hour   Intake 3250 83 ml   Output --   Net 3250 83 ml       Invasive Devices     Peripheral Intravenous Line  Duration           Peripheral IV 02/10/23 Left Hand 1 day    Peripheral IV 02/10/23 Right Antecubital 1 day          Drain  Duration Ileostomy Continent (Abdominal pouch) RLQ 82 days                Physical Exam:     Physical Exam  Vitals reviewed  Constitutional:       General: He is not in acute distress  Appearance: He is well-developed  He is not toxic-appearing or diaphoretic  HENT:      Head: Normocephalic and atraumatic  Eyes:      General: No scleral icterus  Neck:      Trachea: No tracheal deviation  Cardiovascular:      Rate and Rhythm: Normal rate and regular rhythm  Heart sounds: S1 normal and S2 normal  No murmur heard  No friction rub  No gallop  Pulmonary:      Effort: Pulmonary effort is normal  No tachypnea, accessory muscle usage or respiratory distress  Breath sounds: Normal breath sounds  No stridor  No decreased breath sounds, wheezing, rhonchi or rales  Chest:      Chest wall: No tenderness  Abdominal:      General: Bowel sounds are normal  There is no distension  Palpations: Abdomen is soft  Tenderness: There is no abdominal tenderness  Comments: Ileostomy in place  Musculoskeletal:         General: No tenderness  Cervical back: Neck supple  Right lower leg: No edema  Left lower leg: No edema  Skin:     General: Skin is warm and dry  Findings: No rash  Neurological:      Mental Status: He is alert and oriented to person, place, and time  GCS: GCS eye subscore is 4  GCS verbal subscore is 5  GCS motor subscore is 6  Psychiatric:         Speech: Speech normal          Behavior: Behavior normal  Behavior is cooperative         Labs: CBC:   Lab Results   Component Value Date    WBC 11 52 (H) 02/11/2023    HGB 8 7 (L) 02/11/2023    HCT 29 9 (L) 02/11/2023    MCV 65 (L) 02/11/2023     02/11/2023    MCH 18 9 (L) 02/11/2023    MCHC 29 1 (L) 02/11/2023    RDW 16 6 (H) 02/11/2023    MPV 9 3 02/11/2023   , CMP: No results found for: SODIUM, K, CL, CO2, ANIONGAP, BUN, CREATININE, GLUCOSE, CALCIUM, AST, ALT, ALKPHOS, PROT, BILITOT, EGFR    Imaging and other studies: None new

## 2023-02-11 NOTE — ASSESSMENT & PLAN NOTE
Patient has history of intermittently elevated LFTs  Denies alcohol use  Suspect may be reactionary to acute illness  Repeat CMP in AM; trending down  Serial abdominal exams

## 2023-02-11 NOTE — PROGRESS NOTES
Progress Note - General Surgery   Hang Hartman 32 y o  male MRN: [id number]  Unit/Bed#: 2 South 202 Encounter: [id number]    Assessment:  31 y/o M w/ hx of Ulcerative Colitis, complex past surgical history, including:  --Laparoscopic total abdominal colectomy, creation of J-pouch (2015- [facility])  --Exploratory Laparotomy/abdominal washout/small bowel resection, creation of loop colostomy (10/2022- [facility]),     Now p/w multiple episodes of nausea/vomiting, with significant J-pouch dilation, as well as bilateral pneumonia    Plan:  --Scheduled for J-pouch revision with [facility] in 04/2023, may require transfer to [facility] for further evaluation if symptoms persist (patient prefers to do this on an outpatient basis if possible, rather than an inpatient transfer)  --Continue serial abdominal exams  --Consider initiation of Clear liquid diet if patient has no further episodes of N/V  --Pain control  --Continue IV Abx for pneumonia- per primary team    Subjective/Objective     Subjective:     No acute events overnight  Patient feels well this morning, denies any N/V, or abdominal pain  Objective:     Blood pressure 114/61, pulse 84, temperature 98 1 °F (36 7 °C), temperature source Oral, resp  rate 17, height 5' 9" (1 753 m), weight 85 6 kg (188 lb 11 4 oz), SpO2 96 %  ,Body mass index is 27 87 kg/m²  Intake/Output Summary (Last 24 hours) at 2/11/2023 0744  Last data filed at 2/11/2023 3812  Gross per 24 hour   Intake 4250 83 ml   Output --   Net 4250 83 ml       Invasive Devices     Peripheral Intravenous Line  Duration           Peripheral IV 02/10/23 Left Hand <1 day    Peripheral IV 02/10/23 Right Antecubital <1 day          Drain  Duration           Ileostomy Continent (Abdominal pouch) RLQ 82 days                Physical Exam:    GEN: NAD  HEENT: MMM  CV: RRR  Lung: normal effort  Ab: Soft, NT/ND  Extrem: No CCE  Neuro:  A+Ox3, motor and sensation grossly intact    Lab, Imaging and other studies:  CBC:   Lab Results   Component Value Date    WBC 20 22 (H) 02/10/2023    HGB 10 6 (L) 02/10/2023    HCT 35 0 (L) 02/10/2023    MCV 63 (L) 02/10/2023     02/10/2023    MCH 19 0 (L) 02/10/2023    MCHC 30 3 (L) 02/10/2023    RDW 17 3 (H) 02/10/2023    MPV 8 5 (L) 02/10/2023    NRBC 0 02/10/2023   , CMP:   Lab Results   Component Value Date    SODIUM 134 (L) 02/10/2023    K 4 5 02/10/2023    CL 99 02/10/2023    CO2 26 02/10/2023    BUN 10 02/10/2023    CREATININE 0 96 02/10/2023    CALCIUM 8 5 02/10/2023    AST 70 (H) 02/10/2023    ALT 80 (H) 02/10/2023    ALKPHOS 130 (H) 02/10/2023    EGFR 106 02/10/2023   , Coagulation:   Lab Results   Component Value Date    INR 0 98 02/10/2023   , Urinalysis:   Lab Results   Component Value Date    COLORU Yellow 02/10/2023    CLARITYU Clear 02/10/2023    SPECGRAV <=1 005 02/10/2023    PHUR 6 0 02/10/2023    LEUKOCYTESUR Negative 02/10/2023    NITRITE Negative 02/10/2023    GLUCOSEU Negative 02/10/2023    KETONESU Negative 02/10/2023    BILIRUBINUR Negative 02/10/2023    BLOODU Negative 02/10/2023     VTE Pharmacologic Prophylaxis: Enoxaparin (Lovenox)  VTE Mechanical Prophylaxis: sequential compression device

## 2023-02-11 NOTE — ASSESSMENT & PLAN NOTE
Present on admission as evidenced by temperature 102 5, heart rate 140, WBC 20 22 with suspected source leaning more toward GI source as pulmonary indicated imaging actually showing improvement in regards to pneumonia  As per discussion with surgery team will continue with IV Zosyn; Patient still had nausea, received Zofran IV around noon today; hold off clears at this time  Continue IVF for hydration   WBC decreased to 11 52  Blood cultures negative to date

## 2023-02-12 LAB
ALBUMIN SERPL BCP-MCNC: 3 G/DL (ref 3.5–5)
ALP SERPL-CCNC: 97 U/L (ref 46–116)
ALT SERPL W P-5'-P-CCNC: 41 U/L (ref 12–78)
ANION GAP SERPL CALCULATED.3IONS-SCNC: 7 MMOL/L (ref 4–13)
AST SERPL W P-5'-P-CCNC: 17 U/L (ref 5–45)
BILIRUB SERPL-MCNC: 0.48 MG/DL (ref 0.2–1)
BUN SERPL-MCNC: 4 MG/DL (ref 5–25)
CALCIUM ALBUM COR SERPL-MCNC: 9.6 MG/DL (ref 8.3–10.1)
CALCIUM SERPL-MCNC: 8.8 MG/DL (ref 8.3–10.1)
CHLORIDE SERPL-SCNC: 99 MMOL/L (ref 96–108)
CO2 SERPL-SCNC: 29 MMOL/L (ref 21–32)
CREAT SERPL-MCNC: 0.95 MG/DL (ref 0.6–1.3)
ERYTHROCYTE [DISTWIDTH] IN BLOOD BY AUTOMATED COUNT: 16.1 % (ref 11.6–15.1)
GFR SERPL CREATININE-BSD FRML MDRD: 107 ML/MIN/1.73SQ M
GLUCOSE SERPL-MCNC: 106 MG/DL (ref 65–140)
HCT VFR BLD AUTO: 29.8 % (ref 36.5–49.3)
HGB BLD-MCNC: 8.8 G/DL (ref 12–17)
MCH RBC QN AUTO: 18.8 PG (ref 26.8–34.3)
MCHC RBC AUTO-ENTMCNC: 29.5 G/DL (ref 31.4–37.4)
MCV RBC AUTO: 64 FL (ref 82–98)
PLATELET # BLD AUTO: 290 THOUSANDS/UL (ref 149–390)
PMV BLD AUTO: 9.2 FL (ref 8.9–12.7)
POTASSIUM SERPL-SCNC: 3.4 MMOL/L (ref 3.5–5.3)
PROT SERPL-MCNC: 6.4 G/DL (ref 6.4–8.4)
RBC # BLD AUTO: 4.68 MILLION/UL (ref 3.88–5.62)
SODIUM SERPL-SCNC: 135 MMOL/L (ref 135–147)
WBC # BLD AUTO: 6 THOUSAND/UL (ref 4.31–10.16)

## 2023-02-12 RX ORDER — POTASSIUM CHLORIDE 20 MEQ/1
40 TABLET, EXTENDED RELEASE ORAL ONCE
Status: COMPLETED | OUTPATIENT
Start: 2023-02-12 | End: 2023-02-12

## 2023-02-12 RX ADMIN — HYDROMORPHONE HYDROCHLORIDE 1 MG: 1 INJECTION, SOLUTION INTRAMUSCULAR; INTRAVENOUS; SUBCUTANEOUS at 00:29

## 2023-02-12 RX ADMIN — PIPERACILLIN SODIUM AND TAZOBACTAM SODIUM 4.5 G: 4; .5 INJECTION, POWDER, LYOPHILIZED, FOR SOLUTION INTRAVENOUS at 08:39

## 2023-02-12 RX ADMIN — PIPERACILLIN SODIUM AND TAZOBACTAM SODIUM 4.5 G: 4; .5 INJECTION, POWDER, LYOPHILIZED, FOR SOLUTION INTRAVENOUS at 20:51

## 2023-02-12 RX ADMIN — HYDROMORPHONE HYDROCHLORIDE 1 MG: 1 INJECTION, SOLUTION INTRAMUSCULAR; INTRAVENOUS; SUBCUTANEOUS at 16:32

## 2023-02-12 RX ADMIN — HYDROMORPHONE HYDROCHLORIDE 1 MG: 1 INJECTION, SOLUTION INTRAMUSCULAR; INTRAVENOUS; SUBCUTANEOUS at 03:43

## 2023-02-12 RX ADMIN — Medication 250 MG: at 17:08

## 2023-02-12 RX ADMIN — FERROUS SULFATE TAB 325 MG (65 MG ELEMENTAL FE) 325 MG: 325 (65 FE) TAB at 07:27

## 2023-02-12 RX ADMIN — HYDROMORPHONE HYDROCHLORIDE 1 MG: 1 INJECTION, SOLUTION INTRAMUSCULAR; INTRAVENOUS; SUBCUTANEOUS at 22:59

## 2023-02-12 RX ADMIN — ONDANSETRON 4 MG: 2 INJECTION INTRAMUSCULAR; INTRAVENOUS at 10:08

## 2023-02-12 RX ADMIN — PANTOPRAZOLE SODIUM 40 MG: 40 TABLET, DELAYED RELEASE ORAL at 08:36

## 2023-02-12 RX ADMIN — HYDROMORPHONE HYDROCHLORIDE 1 MG: 1 INJECTION, SOLUTION INTRAMUSCULAR; INTRAVENOUS; SUBCUTANEOUS at 06:46

## 2023-02-12 RX ADMIN — PIPERACILLIN SODIUM AND TAZOBACTAM SODIUM 4.5 G: 4; .5 INJECTION, POWDER, LYOPHILIZED, FOR SOLUTION INTRAVENOUS at 15:06

## 2023-02-12 RX ADMIN — HYDROMORPHONE HYDROCHLORIDE 1 MG: 1 INJECTION, SOLUTION INTRAMUSCULAR; INTRAVENOUS; SUBCUTANEOUS at 10:07

## 2023-02-12 RX ADMIN — HYDROMORPHONE HYDROCHLORIDE 1 MG: 1 INJECTION, SOLUTION INTRAMUSCULAR; INTRAVENOUS; SUBCUTANEOUS at 19:45

## 2023-02-12 RX ADMIN — DEXTROSE AND SODIUM CHLORIDE 125 ML/HR: 5; .45 INJECTION, SOLUTION INTRAVENOUS at 11:37

## 2023-02-12 RX ADMIN — Medication 250 MG: at 08:36

## 2023-02-12 RX ADMIN — DEXTROSE AND SODIUM CHLORIDE 125 ML/HR: 5; .45 INJECTION, SOLUTION INTRAVENOUS at 19:45

## 2023-02-12 RX ADMIN — DULOXETINE HYDROCHLORIDE 60 MG: 60 CAPSULE, DELAYED RELEASE ORAL at 08:36

## 2023-02-12 RX ADMIN — POTASSIUM CHLORIDE 40 MEQ: 1500 TABLET, EXTENDED RELEASE ORAL at 08:36

## 2023-02-12 RX ADMIN — ENOXAPARIN SODIUM 40 MG: 40 INJECTION SUBCUTANEOUS at 08:37

## 2023-02-12 RX ADMIN — DULOXETINE HYDROCHLORIDE 60 MG: 60 CAPSULE, DELAYED RELEASE ORAL at 17:08

## 2023-02-12 RX ADMIN — DEXTROSE AND SODIUM CHLORIDE 125 ML/HR: 5; .45 INJECTION, SOLUTION INTRAVENOUS at 02:12

## 2023-02-12 RX ADMIN — HYDROMORPHONE HYDROCHLORIDE 1 MG: 1 INJECTION, SOLUTION INTRAMUSCULAR; INTRAVENOUS; SUBCUTANEOUS at 13:27

## 2023-02-12 RX ADMIN — PIPERACILLIN SODIUM AND TAZOBACTAM SODIUM 4.5 G: 4; .5 INJECTION, POWDER, LYOPHILIZED, FOR SOLUTION INTRAVENOUS at 02:10

## 2023-02-12 NOTE — PLAN OF CARE
Problem: RESPIRATORY - ADULT  Goal: Achieves optimal ventilation and oxygenation  Description: INTERVENTIONS:  - Assess for changes in respiratory status  - Assess for changes in mentation and behavior  - Position to facilitate oxygenation and minimize respiratory effort  - Oxygen administered by appropriate delivery if ordered  - Encourage broncho-pulmonary hygiene including cough, deep breathe, Incentive Spirometry  - Assess the need for suctioning and aspirate as needed  - Assess and instruct to report SOB or any respiratory difficulty  - Respiratory Therapy support as indicated  Outcome: Progressing     Problem: PAIN - ADULT  Goal: Verbalizes/displays adequate comfort level or baseline comfort level  Description: Interventions:  - Encourage patient to monitor pain and request assistance  - Assess pain using appropriate pain scale  - Administer analgesics based on type and severity of pain and evaluate response  - Implement non-pharmacological measures as appropriate and evaluate response  - Consider cultural and social influences on pain and pain management  - Notify physician/advanced practitioner if interventions unsuccessful or patient reports new pain  Outcome: Progressing     Problem: GASTROINTESTINAL - ADULT  Goal: Minimal or absence of nausea and/or vomiting  Description: INTERVENTIONS:  - Administer IV fluids if ordered to ensure adequate hydration  - Administer ordered antiemetic medications as needed  - Provide nonpharmacologic comfort measures as appropriate  - Advance diet as tolerated, if ordered  - Consider nutrition services referral to assist patient with adequate nutrition and appropriate food choices  Outcome: Progressing

## 2023-02-12 NOTE — ASSESSMENT & PLAN NOTE
Patient has history of intermittently elevated LFTs  Denies alcohol use  Suspect may be reactionary to acute illness  LFTs normalized   Serial abdominal exams

## 2023-02-12 NOTE — PLAN OF CARE
Problem: RESPIRATORY - ADULT  Goal: Achieves optimal ventilation and oxygenation  Description: INTERVENTIONS:  - Assess for changes in respiratory status  - Assess for changes in mentation and behavior  - Position to facilitate oxygenation and minimize respiratory effort  - Oxygen administered by appropriate delivery if ordered  - Initiate smoking cessation education as indicated  - Encourage broncho-pulmonary hygiene including cough, deep breathe, Incentive Spirometry  - Assess the need for suctioning and aspirate as needed  - Assess and instruct to report SOB or any respiratory difficulty  - Respiratory Therapy support as indicated  Outcome: Progressing     Problem: PAIN - ADULT  Goal: Verbalizes/displays adequate comfort level or baseline comfort level  Description: Interventions:  - Encourage patient to monitor pain and request assistance  - Assess pain using appropriate pain scale  - Administer analgesics based on type and severity of pain and evaluate response  - Implement non-pharmacological measures as appropriate and evaluate response  - Consider cultural and social influences on pain and pain management  - Notify physician/advanced practitioner if interventions unsuccessful or patient reports new pain  Outcome: Progressing     Problem: INFECTION - ADULT  Goal: Absence or prevention of progression during hospitalization  Description: INTERVENTIONS:  - Assess and monitor for signs and symptoms of infection  - Monitor lab/diagnostic results  - Monitor all insertion sites, i e  indwelling lines, tubes, and drains  - Monitor endotracheal if appropriate and nasal secretions for changes in amount and color  - [place] appropriate cooling/warming therapies per order  - Administer medications as ordered  - Instruct and encourage patient and family to use good hand hygiene technique  - Identify and instruct in appropriate isolation precautions for identified infection/condition  Outcome: Progressing  Goal: Absence of fever/infection during neutropenic period  Description: INTERVENTIONS:  - Monitor WBC    Outcome: Progressing     Problem: DISCHARGE PLANNING  Goal: Discharge to home or other facility with appropriate resources  Description: INTERVENTIONS:  - Identify barriers to discharge w/patient and caregiver  - Arrange for needed discharge resources and transportation as appropriate  - Identify discharge learning needs (meds, wound care, etc )  - Arrange for interpretive services to assist at discharge as needed  - Refer to Case Management Department for coordinating discharge planning if the patient needs post-hospital services based on physician/advanced practitioner order or complex needs related to functional status, cognitive ability, or social support system  Outcome: Progressing     Problem: Knowledge Deficit  Goal: Patient/family/caregiver demonstrates understanding of disease process, treatment plan, medications, and discharge instructions  Description: Complete learning assessment and assess knowledge base    Interventions:  - Provide teaching at level of understanding  - Provide teaching via preferred learning methods  Outcome: Progressing     Problem: CARDIOVASCULAR - ADULT  Goal: Maintains optimal cardiac output and hemodynamic stability  Description: INTERVENTIONS:  - Monitor I/O, vital signs and rhythm  - Monitor for S/S and trends of decreased cardiac output  - Administer and titrate ordered vasoactive medications to optimize hemodynamic stability  - Assess quality of pulses, skin color and temperature  - Assess for signs of decreased coronary artery perfusion  - Instruct patient to report change in severity of symptoms  Outcome: Progressing  Goal: Absence of cardiac dysrhythmias or at baseline rhythm  Description: INTERVENTIONS:  - Continuous cardiac monitoring, vital signs, obtain 12 lead EKG if ordered  - Administer antiarrhythmic and heart rate control medications as ordered  - Monitor electrolytes and administer replacement therapy as ordered  Outcome: Progressing     Problem: GASTROINTESTINAL - ADULT  Goal: Minimal or absence of nausea and/or vomiting  Description: INTERVENTIONS:  - Administer IV fluids if ordered to ensure adequate hydration  - Maintain NPO status until nausea and vomiting are resolved  - Nasogastric tube if ordered  - Administer ordered antiemetic medications as needed  - Provide nonpharmacologic comfort measures as appropriate  - Advance diet as tolerated, if ordered  - Consider nutrition services referral to assist patient with adequate nutrition and appropriate food choices  Outcome: Progressing  Goal: Establish and maintain optimal ostomy function  Description: INTERVENTIONS:  - Assess bowel function  - Encourage oral fluids to ensure adequate hydration  - Administer IV fluids if ordered to ensure adequate hydration   - Administer ordered medications as needed  - Encourage mobilization and activity  - Nutrition services referral to assist patient with appropriate food choices  - Assess stoma site  - Consider wound care consult   Outcome: Progressing

## 2023-02-12 NOTE — ASSESSMENT & PLAN NOTE
Patient reports abdominal pain, chronic  History of ulcerative colitis with multiple extensive abdominal surgeries, creation of a J-pouch and ileostomy  Follows with [facility], reports that he does have surgery planned for April for revision  CT chest abdomen pelvis did show a few prominent small bowel loops seen without significant zone transition to suggest obstruction at this time, more distention of the J-pouch with continued mucosal enhancement noted, surgical assessment is advised given the variable distention of the segment prior scans as per radiology report  Acute surgery consulted, indicated that they will refer patient to his surgeon at [facility]  Indicated that they discussed this with the patient in detail and the patient does not wish to be transferred from [facility] to [facility] inpatient admission would prefer to continue with antibiotics here and advance diet, arrange his own appointment and will go to see his surgeon outpatient  Continue IV Zosyn at this time, day 3  As needed Dilaudid for severe pain  Ileostomy functioning, liquid brown stool seen in collection pouch    Serial abdominal exams

## 2023-02-12 NOTE — ASSESSMENT & PLAN NOTE
Patient had recently been hospitalized with bilateral pneumonia, treated with Unasyn  He was discharged on oral Augmentin which patient reports he was compliant and finished his course  Started to feel poorly day before presentation, felt feverish overnight took Tylenol with some water and threw up twice none bloody vomitus  Reports he was able to keep some water down this morning, continued with fever and chills  Presented to the ED for further evaluation and treatment  Patient was noted to have temp 102 5, heart rate 140, procalcitonin mildly elevated 0 95, lactic acid 1 0  CT of chest abdomen pelvis showed bilateral pulmonary consolidations replaced by groundglass opacities although in a larger extent, new pneumonia is possible and should be correlated clinically with new pulmonary symptomology as per radiology report  Did receive 3 L saline  Blood cultures negative to date  Started on Zosyn 4 5 g IV, tolerated in ED  Also received one-time dose of IV vancomycin  Continue with IV Zosyn, monitor    Pulmonary consulted; feel that the imaging is showing improvement; pneumonitis vs pneumonia vs GI source  As noted above suspect GI source is most likely

## 2023-02-12 NOTE — ASSESSMENT & PLAN NOTE
Patient has a history of iron deficiency anemia  We will continue patient's home medication of iron  Hemoglobin currently 8 8, suspect dilutional as patient has no overt signs of bleeding and is receiving IV hydration    Monitor

## 2023-02-12 NOTE — PROGRESS NOTES
Progress Note - General Surgery   Rachel Rogers 32 y o  male MRN: [id number]  Unit/Bed#: 2 South 202 Encounter: [id number]    Assessment:  31 y/o M w/ hx of Ulcerative Colitis, complex past surgical history, including:  --Laparoscopic total abdominal colectomy, creation of J-pouch (2015- [facility])  --Exploratory Laparotomy/abdominal washout/small bowel resection, creation of loop colostomy (10/2022- [facility]),      Now p/w multiple episodes of nausea/vomiting, with significant J-pouch dilation, as well as bilateral pneumonia    Plan:  --Scheduled for J-pouch revision with [facility] in 04/2023- patient requesting close-interval outpatient follow-up with his primary surgeon at [facility], rather than inpatient transfer  --Advance to Full liquid diet   --Pain control  --Continue IV Abx for pneumonia- per primary team    Subjective/Objective     Subjective:     No acute events overnight  Patient feels well today  Tolerated clears well yesterday, without any episodes of vomiting  Still having high output from his ileostomy  Having occasional episodes of nausea, but this is consistent with his baseline episodes prior to admission  Objective:     Blood pressure 105/68, pulse 71, temperature (!) 97 2 °F (36 2 °C), resp  rate 16, height 5' 9" (1 753 m), weight 85 6 kg (188 lb 11 4 oz), SpO2 94 %  ,Body mass index is 27 87 kg/m²  Intake/Output Summary (Last 24 hours) at 2/12/2023 0744  Last data filed at 2/12/2023 0536  Gross per 24 hour   Intake 1450 ml   Output --   Net 1450 ml       Invasive Devices     Peripheral Intravenous Line  Duration           Peripheral IV 02/10/23 Left Hand 1 day    Peripheral IV 02/10/23 Right Antecubital 1 day          Drain  Duration           Ileostomy Continent (Abdominal pouch) RLQ 83 days                Physical Exam:     GEN: NAD  HEENT: MMM  CV: RRR  Lung: normal effort  Ab: Soft, NT/ND, ileostomy viable with liquid stool in bag  Extrem: No CCE  Neuro:  A+Ox3, motor and sensation grossly intact    Lab, Imaging and other studies:  CBC:   Lab Results   Component Value Date    WBC 6 00 02/12/2023    HGB 8 8 (L) 02/12/2023    HCT 29 8 (L) 02/12/2023    MCV 64 (L) 02/12/2023     02/12/2023    MCH 18 8 (L) 02/12/2023    MCHC 29 5 (L) 02/12/2023    RDW 16 1 (H) 02/12/2023    MPV 9 2 02/12/2023   , CMP:   Lab Results   Component Value Date    SODIUM 135 02/12/2023    K 3 4 (L) 02/12/2023    CL 99 02/12/2023    CO2 29 02/12/2023    BUN 4 (L) 02/12/2023    CREATININE 0 95 02/12/2023    CALCIUM 8 8 02/12/2023    AST 17 02/12/2023    ALT 41 02/12/2023    ALKPHOS 97 02/12/2023    EGFR 107 02/12/2023   , Coagulation: No results found for: PT, INR, APTT, Urinalysis: No results found for: COLORU, CLARITYU, SPECGRAV, PHUR, LEUKOCYTESUR, NITRITE, PROTEINUA, GLUCOSEU, KETONESU, BILIRUBINUR, BLOODU, Amylase: No results found for: AMYLASE  VTE Pharmacologic Prophylaxis: Enoxaparin (Lovenox)  VTE Mechanical Prophylaxis: sequential compression device

## 2023-02-12 NOTE — ASSESSMENT & PLAN NOTE
Present on admission as evidenced by temperature 102 5, heart rate 140, WBC 20 22 with suspected source leaning more toward GI source as pulmonary indicated imaging actually showing improvement in regards to pneumonia  As per discussion with surgery team will continue with IV Zosyn, day 3;   Clear liquid diet started 2/11; tolerating  Continue IVF for hydration   WBC decreased to 6 00  Blood cultures negative to date

## 2023-02-12 NOTE — PROGRESS NOTES
Julian 43  Progress Note - Emelia Saver 1995, 32 y o  male MRN: [id number]  Unit/Bed#: [facility]  Encounter: [id number]  Primary Care Provider: Jak Bashir DO   Date and time admitted to hospital: 2/10/2023  8:42 AM    * Abdominal pain  Assessment & Plan  Patient reports abdominal pain, chronic  History of ulcerative colitis with multiple extensive abdominal surgeries, creation of a J-pouch and ileostomy  Follows with [facility], reports that he does have surgery planned for April for revision  CT chest abdomen pelvis did show a few prominent small bowel loops seen without significant zone transition to suggest obstruction at this time, more distention of the J-pouch with continued mucosal enhancement noted, surgical assessment is advised given the variable distention of the segment prior scans as per radiology report  Acute surgery consulted, indicated that they will refer patient to his surgeon at [facility]  Indicated that they discussed this with the patient in detail and the patient does not wish to be transferred from [facility] to [facility] inpatient admission would prefer to continue with antibiotics here and advance diet, arrange his own appointment and will go to see his surgeon outpatient  Continue IV Zosyn at this time, day 3  As needed Dilaudid for severe pain  Ileostomy functioning, liquid brown stool seen in collection pouch  Serial abdominal exams    Sepsis [facility])  Assessment & Plan  Present on admission as evidenced by temperature 102 5, heart rate 140, WBC 20 22 with suspected source leaning more toward GI source as pulmonary indicated imaging actually showing improvement in regards to pneumonia  As per discussion with surgery team will continue with IV Zosyn, day 3;   Clear liquid diet started 2/11; tolerating  Continue IVF for hydration   WBC decreased to 6 00  Blood cultures negative to date       Pneumonia of both lower lobes due to infectious organism  Assessment & Plan  Patient had recently been hospitalized with bilateral pneumonia, treated with Unasyn  He was discharged on oral Augmentin which patient reports he was compliant and finished his course  Started to feel poorly day before presentation, felt feverish overnight took Tylenol with some water and threw up twice none bloody vomitus  Reports he was able to keep some water down this morning, continued with fever and chills  Presented to the ED for further evaluation and treatment  Patient was noted to have temp 102 5, heart rate 140, procalcitonin mildly elevated 0 95, lactic acid 1 0  CT of chest abdomen pelvis showed bilateral pulmonary consolidations replaced by groundglass opacities although in a larger extent, new pneumonia is possible and should be correlated clinically with new pulmonary symptomology as per radiology report  Did receive 3 L saline  Blood cultures negative to date  Started on Zosyn 4 5 g IV, tolerated in ED  Also received one-time dose of IV vancomycin  Continue with IV Zosyn, monitor  Pulmonary consulted; feel that the imaging is showing improvement; pneumonitis vs pneumonia vs GI source  As noted above suspect GI source is most likely       Elevated LFTs  Assessment & Plan  Patient has history of intermittently elevated LFTs  Denies alcohol use  Suspect may be reactionary to acute illness  LFTs normalized   Serial abdominal exams  History of ulcerative colitis  Assessment & Plan  Patient has history of ulcerative colitis, follows [facility] and outpatient GI  As noted above CT did demonstrate some abnormalities  Surgery consulted, recommendations noted above  Reports nonbloody stools in ileostomy  Anemia  Assessment & Plan  Patient has a history of iron deficiency anemia  We will continue patient's home medication of iron  Hemoglobin currently 8 8, suspect dilutional as patient has no overt signs of bleeding and is receiving IV hydration    Monitor    Ankylosing spondylitis [facility])  Assessment & Plan  Patient has a history of ankylosing spondylitis  Follows outpatient PCP          VTE Pharmacologic Prophylaxis: VTE Score: 4 Moderate Risk (Score 3-4) - Pharmacological DVT Prophylaxis Ordered: enoxaparin (Lovenox)  Patient Centered Rounds: I performed bedside rounds with nursing staff today  Discussions with Specialists or Other Care Team Provider: pulmonary     Education and Discussions with Family / Patient: Patient declined call to   Time Spent for Care: 45 minutes  More than 50% of total time spent on counseling and coordination of care as described above  Current Length of Stay: 2 day(s)  Current Patient Status: Inpatient   Certification Statement: The patient will continue to require additional inpatient hospital stay due to Continued IV hydration, IV antibiotics, serial abdominal exams  Discharge Plan: Anticipate discharge in 24-48 hrs to home  Code Status: Level 1 - Full Code    Subjective:   Patient seen sitting up in bed resting, quietly  Reports that he does have abdominal discomfort, however not as bad as previous exam   Reports that he is doing okay with a clear liquid diet, no nausea or vomiting  Reports ileostomy is functioning  No bloody stools  Objective:     Vitals:   Temp (24hrs), Av 5 °F (36 4 °C), Min:97 2 °F (36 2 °C), Max:97 9 °F (36 6 °C)    Temp:  [97 2 °F (36 2 °C)-97 9 °F (36 6 °C)] 97 2 °F (36 2 °C)  HR:  [67-95] 71  Resp:  [16-18] 16  BP: (105-121)/(66-82) 105/68  SpO2:  [93 %-96 %] 94 %  Body mass index is 27 87 kg/m²  Input and Output Summary (last 24 hours): Intake/Output Summary (Last 24 hours) at 2023 1225  Last data filed at 2023 0536  Gross per 24 hour   Intake 1450 ml   Output --   Net 1450 ml       Physical Exam:   Physical Exam  Vitals and nursing note reviewed     Constitutional:       Comments: Chronically ill appearing gentleman resting in bed, appears in pain    HENT:      Head: Normocephalic  Nose: Nose normal       Mouth/Throat:      Mouth: Mucous membranes are moist    Eyes:      Extraocular Movements: Extraocular movements intact  Conjunctiva/sclera: Conjunctivae normal       Pupils: Pupils are equal, round, and reactive to light  Cardiovascular:      Rate and Rhythm: Normal rate and regular rhythm  Pulses: Normal pulses  Heart sounds: Normal heart sounds  Pulmonary:      Effort: Pulmonary effort is normal       Breath sounds: Normal breath sounds  Abdominal:      General: Bowel sounds are normal       Tenderness: There is abdominal tenderness  Comments: Ileostomy present    Genitourinary:     Comments: Voiding spontaneously  Musculoskeletal:         General: Normal range of motion  Cervical back: Normal range of motion  Right lower leg: No edema  Left lower leg: No edema  Skin:     General: Skin is warm and dry  Capillary Refill: Capillary refill takes less than 2 seconds  Neurological:      General: No focal deficit present  Mental Status: He is oriented to person, place, and time  Psychiatric:         Mood and Affect: Mood normal          Behavior: Behavior normal          Thought Content: Thought content normal          Judgment: Judgment normal          Additional Data:     Labs:  Results from last 7 days   Lab Units 02/12/23  0503 02/11/23  0616 02/10/23  0901   WBC Thousand/uL 6 00   < > 20 22*   HEMOGLOBIN g/dL 8 8*   < > 10 6*   HEMATOCRIT % 29 8*   < > 35 0*   PLATELETS Thousands/uL 290   < > 349   NEUTROS PCT %  --   --  85*   LYMPHS PCT %  --   --  6*   MONOS PCT %  --   --  7   EOS PCT %  --   --  1    < > = values in this interval not displayed       Results from last 7 days   Lab Units 02/12/23  0503   SODIUM mmol/L 135   POTASSIUM mmol/L 3 4*   CHLORIDE mmol/L 99   CO2 mmol/L 29   BUN mg/dL 4*   CREATININE mg/dL 0 95   ANION GAP mmol/L 7   CALCIUM mg/dL 8 8   ALBUMIN g/dL 3 0*   TOTAL BILIRUBIN mg/dL 0 48   ALK PHOS U/L 97   ALT U/L 41   AST U/L 17   GLUCOSE RANDOM mg/dL 106     Results from last 7 days   Lab Units 02/10/23  0901   INR  0 98             Results from last 7 days   Lab Units 02/10/23  0901   LACTIC ACID mmol/L 1 0   PROCALCITONIN ng/ml 0 95*       Lines/Drains:  Invasive Devices     Peripheral Intravenous Line  Duration           Peripheral IV 02/10/23 Left Hand 2 days    Peripheral IV 02/10/23 Right Antecubital 2 days          Drain  Duration           Ileostomy Continent (Abdominal pouch) RLQ 83 days                      Imaging: No pertinent imaging reviewed  Recent Cultures (last 7 days):   Results from last 7 days   Lab Units 02/10/23  2124 02/10/23  0901   BLOOD CULTURE   --  No Growth at 24 hrs  No Growth at 24 hrs     LEGIONELLA URINARY ANTIGEN  Negative  --        Last 24 Hours Medication List:   Current Facility-Administered Medications   Medication Dose Route Frequency Provider Last Rate   • acetaminophen  650 mg Oral Q6H PRN [place]     • benzonatate  100 mg Oral TID PRN [place]     • dextrose 5 % and sodium chloride 0 45 %  125 mL/hr Intravenous Continuous [place] Reyez 125 mL/hr (02/12/23 1137)   • DULoxetine  60 mg Oral BID [place]     • enoxaparin  40 mg Subcutaneous Daily [place]     • ferrous sulfate  325 mg Oral Daily With [address] [place]     • HYDROmorphone  1 mg Intravenous Q3H PRN [place]     • ondansetron  4 mg Intravenous Q6H PRN [place] Reyez     • pantoprazole  40 mg Oral Daily [place]     • piperacillin-tazobactam  4 5 g Intravenous Q6H [place] aLw 4 5 g (02/12/23 0839)   • saccharomyces boulardii  250 mg Oral BID [place] Lwa          Today, Patient Was Seen By: [place] Law    **Please Note: This note may have been constructed using a voice recognition system  **

## 2023-02-12 NOTE — PROGRESS NOTES
Progress Note - Pulmonary   Elijah Hernandez 32 y o  male MRN: [id number]  Unit/Bed#: [address] Encounter: [id number]    Assessment/Plan:    Abnormal CT chest with patchy bibasilar alveolar/groundglass opacities with recent aspiration pneumonia              Status post course of Unasyn/Augmentin 2 weeks ago               Dense consolidation has resolved and now has opacities possibly consistent with resolution of prior pneumonia plus minus pneumonitis              NSRN for sepsis and antibiotics as listed below               Repeat chest x-ray in 2 months      Sepsis present on admission              Pneumonitis versus GI etiology              General surgery is consulted due to distended J-pouch and continued mucosal enhancement               Continue with Zosyn and follow procalcitonin/culture data               Monitor temperatures and WBCs  Would defer antibiotic duration to primary team/general surgery given less likely pulmonary etiology of sepsis       Ulcerative colitis with abdominal pain/vomiting/ileostomy              Surgery consult noted      Discussed with primary team     Chief Complaint:    "I feel better "    Subjective:    Marisela Hernandez is sitting up in bed  He reports he feels better  Still slightly nauseated  No cough or shortness of breath  Objective:    Vitals: Blood pressure 105/68, pulse 71, temperature (!) 97 2 °F (36 2 °C), resp  rate 16, height 5' 9" (1 753 m), weight 85 6 kg (188 lb 11 4 oz), SpO2 94 %  RA,Body mass index is 27 87 kg/m²        Intake/Output Summary (Last 24 hours) at 2/12/2023 3836  Last data filed at 2/12/2023 0536  Gross per 24 hour   Intake 1450 ml   Output --   Net 1450 ml       Invasive Devices     Peripheral Intravenous Line  Duration           Peripheral IV 02/10/23 Left Hand 1 day    Peripheral IV 02/10/23 Right Antecubital 1 day          Drain  Duration           Ileostomy Continent (Abdominal pouch) RLQ 83 days                Physical Exam:     Physical Exam  Vitals reviewed  Constitutional:       General: He is not in acute distress  Appearance: He is well-developed  He is not toxic-appearing or diaphoretic  HENT:      Head: Normocephalic and atraumatic  Eyes:      General: No scleral icterus  Neck:      Trachea: No tracheal deviation  Cardiovascular:      Rate and Rhythm: Normal rate and regular rhythm  Heart sounds: S1 normal and S2 normal  No murmur heard  No friction rub  No gallop  Pulmonary:      Effort: Pulmonary effort is normal  No tachypnea, accessory muscle usage or respiratory distress  Breath sounds: Normal breath sounds  No stridor  No decreased breath sounds, wheezing, rhonchi or rales  Chest:      Chest wall: No tenderness  Abdominal:      General: Bowel sounds are normal  There is no distension  Palpations: Abdomen is soft  Tenderness: There is no abdominal tenderness  Comments: Ileostomy in place  Musculoskeletal:         General: No tenderness  Cervical back: Neck supple  Right lower leg: No edema  Left lower leg: No edema  Skin:     General: Skin is warm and dry  Findings: No rash  Neurological:      Mental Status: He is alert and oriented to person, place, and time  GCS: GCS eye subscore is 4  GCS verbal subscore is 5  GCS motor subscore is 6  Psychiatric:         Speech: Speech normal          Behavior: Behavior normal  Behavior is cooperative         Labs:   CBC:   Lab Results   Component Value Date    WBC 6 00 02/12/2023    HGB 8 8 (L) 02/12/2023    HCT 29 8 (L) 02/12/2023    MCV 64 (L) 02/12/2023     02/12/2023    MCH 18 8 (L) 02/12/2023    MCHC 29 5 (L) 02/12/2023    RDW 16 1 (H) 02/12/2023    MPV 9 2 02/12/2023   , CMP:   Lab Results   Component Value Date    SODIUM 135 02/12/2023    K 3 4 (L) 02/12/2023    CL 99 02/12/2023    CO2 29 02/12/2023    BUN 4 (L) 02/12/2023    CREATININE 0 95 02/12/2023    CALCIUM 8 8 02/12/2023    AST 17 02/12/2023    ALT 41 02/12/2023    ALKPHOS 97 02/12/2023    EGFR 107 02/12/2023     Imaging and other studies: None today

## 2023-02-13 PROBLEM — R79.89 ELEVATED LFTS: Status: RESOLVED | Noted: 2022-01-02 | Resolved: 2023-02-13

## 2023-02-13 PROBLEM — J18.9 PNEUMONIA OF BOTH LOWER LOBES DUE TO INFECTIOUS ORGANISM: Status: RESOLVED | Noted: 2023-01-26 | Resolved: 2023-02-13

## 2023-02-13 LAB
ANION GAP SERPL CALCULATED.3IONS-SCNC: 6 MMOL/L (ref 4–13)
BUN SERPL-MCNC: 2 MG/DL (ref 5–25)
CALCIUM SERPL-MCNC: 8.3 MG/DL (ref 8.3–10.1)
CHLORIDE SERPL-SCNC: 99 MMOL/L (ref 96–108)
CO2 SERPL-SCNC: 29 MMOL/L (ref 21–32)
CREAT SERPL-MCNC: 1.04 MG/DL (ref 0.6–1.3)
ERYTHROCYTE [DISTWIDTH] IN BLOOD BY AUTOMATED COUNT: 16.1 % (ref 11.6–15.1)
GFR SERPL CREATININE-BSD FRML MDRD: 96 ML/MIN/1.73SQ M
GLUCOSE SERPL-MCNC: 438 MG/DL (ref 65–140)
GLUCOSE SERPL-MCNC: 85 MG/DL (ref 65–140)
HCT VFR BLD AUTO: 31 % (ref 36.5–49.3)
HGB BLD-MCNC: 9.1 G/DL (ref 12–17)
MCH RBC QN AUTO: 18.9 PG (ref 26.8–34.3)
MCHC RBC AUTO-ENTMCNC: 29.4 G/DL (ref 31.4–37.4)
MCV RBC AUTO: 64 FL (ref 82–98)
PLATELET # BLD AUTO: 339 THOUSANDS/UL (ref 149–390)
PMV BLD AUTO: 8.7 FL (ref 8.9–12.7)
POTASSIUM SERPL-SCNC: 3.5 MMOL/L (ref 3.5–5.3)
RBC # BLD AUTO: 4.82 MILLION/UL (ref 3.88–5.62)
SODIUM SERPL-SCNC: 134 MMOL/L (ref 135–147)
WBC # BLD AUTO: 5.53 THOUSAND/UL (ref 4.31–10.16)

## 2023-02-13 RX ORDER — POTASSIUM CHLORIDE 20 MEQ/1
40 TABLET, EXTENDED RELEASE ORAL ONCE
Status: COMPLETED | OUTPATIENT
Start: 2023-02-13 | End: 2023-02-13

## 2023-02-13 RX ORDER — SODIUM CHLORIDE 9 MG/ML
100 INJECTION, SOLUTION INTRAVENOUS CONTINUOUS
Status: DISCONTINUED | OUTPATIENT
Start: 2023-02-13 | End: 2023-02-14 | Stop reason: HOSPADM

## 2023-02-13 RX ADMIN — DULOXETINE HYDROCHLORIDE 60 MG: 60 CAPSULE, DELAYED RELEASE ORAL at 17:43

## 2023-02-13 RX ADMIN — HYDROMORPHONE HYDROCHLORIDE 1 MG: 1 INJECTION, SOLUTION INTRAMUSCULAR; INTRAVENOUS; SUBCUTANEOUS at 02:29

## 2023-02-13 RX ADMIN — HYDROMORPHONE HYDROCHLORIDE 1 MG: 1 INJECTION, SOLUTION INTRAMUSCULAR; INTRAVENOUS; SUBCUTANEOUS at 19:34

## 2023-02-13 RX ADMIN — Medication 250 MG: at 08:16

## 2023-02-13 RX ADMIN — PANTOPRAZOLE SODIUM 40 MG: 40 TABLET, DELAYED RELEASE ORAL at 08:16

## 2023-02-13 RX ADMIN — HYDROMORPHONE HYDROCHLORIDE 1 MG: 1 INJECTION, SOLUTION INTRAMUSCULAR; INTRAVENOUS; SUBCUTANEOUS at 12:47

## 2023-02-13 RX ADMIN — DEXTROSE AND SODIUM CHLORIDE 125 ML/HR: 5; .45 INJECTION, SOLUTION INTRAVENOUS at 04:59

## 2023-02-13 RX ADMIN — PIPERACILLIN SODIUM AND TAZOBACTAM SODIUM 4.5 G: 4; .5 INJECTION, POWDER, LYOPHILIZED, FOR SOLUTION INTRAVENOUS at 08:12

## 2023-02-13 RX ADMIN — PIPERACILLIN SODIUM AND TAZOBACTAM SODIUM 4.5 G: 4; .5 INJECTION, POWDER, LYOPHILIZED, FOR SOLUTION INTRAVENOUS at 02:43

## 2023-02-13 RX ADMIN — SODIUM CHLORIDE 125 ML/HR: 0.9 INJECTION, SOLUTION INTRAVENOUS at 08:06

## 2023-02-13 RX ADMIN — FERROUS SULFATE TAB 325 MG (65 MG ELEMENTAL FE) 325 MG: 325 (65 FE) TAB at 08:16

## 2023-02-13 RX ADMIN — HYDROMORPHONE HYDROCHLORIDE 1 MG: 1 INJECTION, SOLUTION INTRAMUSCULAR; INTRAVENOUS; SUBCUTANEOUS at 09:12

## 2023-02-13 RX ADMIN — HYDROMORPHONE HYDROCHLORIDE 1 MG: 1 INJECTION, SOLUTION INTRAMUSCULAR; INTRAVENOUS; SUBCUTANEOUS at 16:11

## 2023-02-13 RX ADMIN — ENOXAPARIN SODIUM 40 MG: 40 INJECTION SUBCUTANEOUS at 08:16

## 2023-02-13 RX ADMIN — PIPERACILLIN SODIUM AND TAZOBACTAM SODIUM 4.5 G: 4; .5 INJECTION, POWDER, LYOPHILIZED, FOR SOLUTION INTRAVENOUS at 14:32

## 2023-02-13 RX ADMIN — HYDROMORPHONE HYDROCHLORIDE 1 MG: 1 INJECTION, SOLUTION INTRAMUSCULAR; INTRAVENOUS; SUBCUTANEOUS at 22:57

## 2023-02-13 RX ADMIN — DULOXETINE HYDROCHLORIDE 60 MG: 60 CAPSULE, DELAYED RELEASE ORAL at 08:16

## 2023-02-13 RX ADMIN — POTASSIUM CHLORIDE 40 MEQ: 1500 TABLET, EXTENDED RELEASE ORAL at 14:32

## 2023-02-13 RX ADMIN — HYDROMORPHONE HYDROCHLORIDE 1 MG: 1 INJECTION, SOLUTION INTRAMUSCULAR; INTRAVENOUS; SUBCUTANEOUS at 05:45

## 2023-02-13 RX ADMIN — Medication 250 MG: at 17:43

## 2023-02-13 RX ADMIN — PIPERACILLIN SODIUM AND TAZOBACTAM SODIUM 4.5 G: 4; .5 INJECTION, POWDER, LYOPHILIZED, FOR SOLUTION INTRAVENOUS at 21:22

## 2023-02-13 NOTE — PLAN OF CARE
Problem: GASTROINTESTINAL - ADULT  Goal: Minimal or absence of nausea and/or vomiting  Description: INTERVENTIONS:  - Administer IV fluids if ordered to ensure adequate hydration  - Maintain NPO status until nausea and vomiting are resolved  - Nasogastric tube if ordered  - Administer ordered antiemetic medications as needed  - Provide nonpharmacologic comfort measures as appropriate  - Advance diet as tolerated, if ordered  - Consider nutrition services referral to assist patient with adequate nutrition and appropriate food choices  Outcome: Progressing     Problem: PAIN - ADULT  Goal: Verbalizes/displays adequate comfort level or baseline comfort level  Description: Interventions:  - Encourage patient to monitor pain and request assistance  - Assess pain using appropriate pain scale  - Administer analgesics based on type and severity of pain and evaluate response  - Implement non-pharmacological measures as appropriate and evaluate response  - Consider cultural and social influences on pain and pain management  - Notify physician/advanced practitioner if interventions unsuccessful or patient reports new pain  Outcome: Progressing     Problem: Knowledge Deficit  Goal: Patient/family/caregiver demonstrates understanding of disease process, treatment plan, medications, and discharge instructions  Description: Complete learning assessment and assess knowledge base    Interventions:  - Provide teaching at level of understanding  - Provide teaching via preferred learning methods  Outcome: Progressing

## 2023-02-13 NOTE — CASE MANAGEMENT
Case Management Assessment & Discharge Planning Note    Patient name Rafia Webster  Location [facility]  [address] MRN [id number]  : 1995 Date 2023       Current Admission Date: 2/10/2023  Current Admission Diagnosis:Abdominal pain   Patient Active Problem List    Diagnosis Date Noted   • Sepsis ([facility] [address] ) 2023   • Pneumonia of both lower lobes due to infectious organism 2023   • Electrolyte abnormality 2023   • Nausea vomiting and diarrhea 2023   • Chronic pain syndrome 2022   • At high risk for cardiac arrhythmia 10/19/2022   • Peristomal dermatitis 10/19/2022   • Ileostomy present ([facility] [address] ) 10/15/2022   • Elevated C-reactive protein (CRP) 2022   • Intractable abdominal pain 2022   • Rectal obstruction 2022   • Epigastric abdominal pain 2022   • Abnormal CT scan of lung 2022   • Presumed AV endocarditis 2022   • Anemia 2022   • Polymicrobial after streptococcal bacteremia 2022   • Enteritis 2022   • Ankylosing spondylitis ([facility] [address] ) 2022   • Elevated LFTs 2022   • Arm and leg movements, uncontrollable 2021   • Seizure-like activity ([facility] [address] ) 2021   • Arthralgia 09/15/2020   • Sacroiliac joint dysfunction of right side 2020   • Left groin pain 2019   • Left-sided low back pain without sciatica    • Complications of intestinal pouch ([facility] [address] ) 2019   • History of ulcerative colitis 2019   • CAR (generalized anxiety disorder) 10/19/2018   • BMI 26 0-26 9,adult 10/19/2018   • Abdominal pain 2018   • 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[facility] [address] ) 2018   • Stricture of rectum 2018      LOS (days): 3  Geometric Mean LOS (GMLOS) (days):   Days to GMLOS:     OBJECTIVE:  PATIENT READMITTED TO HOSPITAL  Risk of Unplanned Readmission Score: 55 6         Current admission status: Inpatient     Preferred Pharmacy:   [address] [facility] [address] [address]  [address] [facility] [place] [address]  Phone: [phone] Fax: [phone]    Primary Care Provider: Yonny Chung DO    Primary Insurance: BLUE CROSS  Secondary Insurance:     ASSESSMENT:  [address] Representative - Father   Primary Phone: [phone] (Mobile)  Home Phone: [phone]                   Readmission Root Cause  30 Day Readmission: Yes  Who directed you to return to the hospital?: Self  Did you understand whom to contact if you had questions or problems?: Yes  Were you able to get your prescriptions filled when you left the hospital?: Yes  Did you take your medications as prescribed?: Yes  Were you able to get to your follow-up appointments?: Yes  Patient was readmitted due to: N/V, abdominal pain    Patient Information  Admitted from[de-identified] Home  Mental Status: Alert  During Assessment patient was accompanied by: Not accompanied during assessment  Assessment information provided by[de-identified] Patient  Primary Caregiver: Self  Support Systems: Self, Parent  [place] of Residence: [address] [place] [address] do you live in?: [place]  In the last 12 months, was there a time when you were not able to pay the mortgage or rent on time?: No  In the last 12 months, how many places have you lived?: 1  In the last 12 months, was there a time when you did not have a steady place to sleep or slept in a shelter (including now)?: No  Homeless/housing insecurity resource given?: N/A  Living Arrangements: Lives w/ Extended Family    Activities of Daily Living Prior to Admission  Functional Status: Independent  Completes ADLs independently?: Yes  Ambulates independently?: Yes  Does patient use assisted devices?: No  Does patient currently own DME?: No  Does patient have a history of Outpatient Therapy (PT/OT)?: No  Does the patient have a history of Short-Term Rehab?: No  Does patient have a history of HHC?: No  Does patient currently have [facility]?: No     Patient Information Continued  Does patient have prescription coverage?: Yes  Within the past 12 months, you worried that your food would run out before you got the money to buy more : Never true  Within the past 12 months, the food you bought just didn't last and you didn't have money to get more : Never true  Food insecurity resource given?: N/A  Does patient receive dialysis treatments?: No     Means of Transportation  Means of Transport to Appts[de-identified] Drives Self  In the past 12 months, has lack of transportation kept you from medical appointments or from getting medications?: No  In the past 12 months, has lack of transportation kept you from meetings, work, or from getting things needed for daily living?: No  Was application for public transport provided?: N/A    DISCHARGE DETAILS:    Discharge planning discussed with[de-identified] Patient        Other Referral/Resources/Interventions Provided:  Interventions: None Indicated  Referral Comments: Cm met with patient at beside, introduced self, role, purpose of initial assessment and discharge planning  Discussed discharge planning and patient denied having any CM needs at this time  No CM needs identified at this time, CM will continue to assess and follow up pending clinical progress

## 2023-02-13 NOTE — ASSESSMENT & PLAN NOTE
Present on admission as evidenced by temperature 102 5, heart rate 140, WBC 20 22 with suspected source being intra-abdominal as pulmonary indicated imaging actually showing improvement in regards to pneumonia  · Continue with IV Zosyn  · Clear liquid diet --> full liquid --> fiber low/residue per surgery  · Continue IVF for hydration   · Leukocytosis resolved  · Blood cultures negative to date

## 2023-02-13 NOTE — PROGRESS NOTES
Marta U  64   Progress Note - Xiomara Thomas 1995, 32 y o  male MRN: [id number]  Unit/Bed#: 1101 [facility]  Encounter: [id number]  Primary Care Provider: Lizbeth Alex DO   Date and time admitted to hospital: 2/10/2023  8:42 AM    * Sepsis [facility])  Assessment & Plan  Present on admission as evidenced by temperature 102 5, heart rate 140, WBC 20 22 with suspected source being intra-abdominal as pulmonary indicated imaging actually showing improvement in regards to pneumonia  · Continue with IV Zosyn  · Clear liquid diet --> full liquid --> fiber low/residue per surgery  · Continue IVF for hydration   · Leukocytosis resolved  · Blood cultures negative to date  Abdominal pain  Assessment & Plan  Patient reports abdominal pain, chronic  · History of ulcerative colitis with multiple extensive abdominal surgeries, creation of a J-pouch and ileostomy  · Follows with [facility], reports that he does have surgery planned for April for revision  · CT chest abdomen pelvis did show a few prominent small bowel loops but no obstruction  J-pouch distention noted with continued mucosal enhancement  · Surgery recommended possible transfer to his surgeon at [facility]  Indicated that they discussed this with the patient in detail and the patient does not wish to be transferred and prefers to continue with antibiotics here and advance diet, arrange his own appointment and will go to see his surgeon outpatient  · Continue prn pain meds     Pneumonia of both lower lobes due to infectious organism-resolved as of 2/13/2023  Assessment & Plan  Patient had recently been hospitalized with bilateral pneumonia, treated with Unasyn  He was discharged on oral Augmentin which patient reports he was compliant and finished his course  Started to feel poorly day before presentation, felt feverish overnight took Tylenol with some water and threw up twice none bloody vomitus    - pneumonia ruled out      History of ulcerative colitis  Assessment & Plan  Patient has history of ulcerative colitis, follows [facility] and outpatient GI   · CT scan as noted above  · Reports increased nonbloody output in ileostomy, likley due to antibiotic use    Anemia  Assessment & Plan  Patient has a history of iron deficiency anemia  Continue iron  · hemoGlobin low but overall stable    Results from last 7 days   Lab Units 23  0500 23  0503 23  0616 02/10/23  0901   HEMOGLOBIN g/dL 9 1* 8 8* 8 7* 10 6*   HEMATOCRIT % 31 0* 29 8* 29 9* 35 0*   MCV fL 64* 64* 65* 63*       Ankylosing spondylitis (HCC)  Assessment & Plan  Patient has a history of ankylosing spondylitis  Follows outpatient PCP  Elevated LFTs-resolved as of 2023  Assessment & Plan  Patient has history of intermittently elevated LFTs  Denies alcohol use  Suspect may be reactionary to acute illness  LFTs normalized       VTE Pharmacologic Prophylaxis: VTE Score: 4 Moderate Risk (Score 3-4) - Pharmacological DVT Prophylaxis Ordered: enoxaparin (Lovenox)  Patient Centered Rounds: I performed bedside rounds with nursing staff today  Discussions with Specialists or Other Care Team Provider: yes - surgery    Education and Discussions with Family / Patient: yes    Time Spent for Care: 35 min  More than 50% of total time spent on counseling and coordination of care as described above  Current Length of Stay: 3 day(s)  Current Patient Status: Inpatient   Certification Statement: The patient will continue to require additional inpatient hospital stay due to Abdominal pain/sepsis requiring advancement of diet, IV antibiotic  Discharge Plan: Anticipate discharge in 24-48 hrs to home  Code Status: Level 1 - Full Code    Subjective:     Patient reports some nausea but no vomiting  Tolerated full liquid diet    Still with increased output from ostomy    Objective:     Vitals:   Temp (24hrs), Av 7 °F (36 5 °C), Min:97 5 °F (36 4 °C), Max:97 9 °F (36 6 °C)    Temp:  [97 5 °F (36 4 °C)-97 9 °F (36 6 °C)] 97 5 °F (36 4 °C)  HR:  [72-79] 72  Resp:  [16-18] 18  BP: (109-127)/(65-77) 111/65  SpO2:  [94 %-99 %] 95 %  Body mass index is 27 87 kg/m²  Input and Output Summary (last 24 hours):   No intake or output data in the 24 hours ending 02/13/23 0910    Physical Exam:   Physical Exam  Vitals reviewed  Constitutional:       General: He is not in acute distress  Appearance: He is not toxic-appearing  HENT:      Head: Normocephalic and atraumatic  Eyes:      General:         Right eye: No discharge  Left eye: No discharge  Cardiovascular:      Rate and Rhythm: Normal rate and regular rhythm  Pulmonary:      Effort: Pulmonary effort is normal  No respiratory distress  Breath sounds: Normal breath sounds  No wheezing or rales  Abdominal:      General: Bowel sounds are normal  There is no distension  Palpations: Abdomen is soft  Tenderness: There is abdominal tenderness (LUQ/LLQ)  There is no guarding or rebound  Comments: (+) ileostomy in place   Neurological:      Mental Status: He is alert and oriented to person, place, and time  Additional Data:     Labs:  Results from last 7 days   Lab Units 02/13/23  0500 02/11/23  0616 02/10/23  0901   WBC Thousand/uL 5 53   < > 20 22*   HEMOGLOBIN g/dL 9 1*   < > 10 6*   HEMATOCRIT % 31 0*   < > 35 0*   PLATELETS Thousands/uL 339   < > 349   NEUTROS PCT %  --   --  85*   LYMPHS PCT %  --   --  6*   MONOS PCT %  --   --  7   EOS PCT %  --   --  1    < > = values in this interval not displayed       Results from last 7 days   Lab Units 02/13/23  0500 02/12/23  0503   SODIUM mmol/L 134* 135   POTASSIUM mmol/L 3 5 3 4*   CHLORIDE mmol/L 99 99   CO2 mmol/L 29 29   BUN mg/dL 2* 4*   CREATININE mg/dL 1 04 0 95   ANION GAP mmol/L 6 7   CALCIUM mg/dL 8 3 8 8   ALBUMIN g/dL  --  3 0*   TOTAL BILIRUBIN mg/dL  --  0 48   ALK PHOS U/L  --  97   ALT U/L  --  41   AST U/L  --  17   GLUCOSE RANDOM mg/dL 438* 106 Results from last 7 days   Lab Units 02/10/23  0901   INR  0 98             Results from last 7 days   Lab Units 02/10/23  0901   LACTIC ACID mmol/L 1 0   PROCALCITONIN ng/ml 0 95*       Lines/Drains:  Invasive Devices     Peripheral Intravenous Line  Duration           Peripheral IV 02/12/23 Dorsal (posterior); Right Hand <1 day          Drain  Duration           Ileostomy Continent (Abdominal pouch) RLQ 84 days                      Imaging: Reviewed radiology reports from this admission including: chest CT scan and abdominal/pelvic CT    Recent Cultures (last 7 days):   Results from last 7 days   Lab Units 02/10/23  2124 02/10/23  0901   BLOOD CULTURE   --  No Growth at 48 hrs  No Growth at 48 hrs  LEGIONELLA URINARY ANTIGEN  Negative  --        Last 24 Hours Medication List:   Current Facility-Administered Medications   Medication Dose Route Frequency Provider Last Rate   • acetaminophen  650 mg Oral Q6H PRN Minen Rockwall, CRNP     • benzonatate  100 mg Oral TID PRN Isa Woods, CRNP     • DULoxetine  60 mg Oral BID Isa Castillo, CRNP     • enoxaparin  40 mg Subcutaneous Daily Isa Castillo, CRNP     • ferrous sulfate  325 mg Oral Daily With Breakfast Lavalex Castillo, CRNP     • HYDROmorphone  1 mg Intravenous Q3H PRN Isa Castillo, CRNP     • ondansetron  4 mg Intravenous Q6H PRN [place] Reyez     • pantoprazole  40 mg Oral Daily Lavjesin Rockwall, CRNP     • piperacillin-tazobactam  4 5 g Intravenous Q6H CARLITOS JiménezNP 4 5 g (02/13/23 9910)   • saccharomyces boulardii  250 mg Oral BID Isa Castillo, CRNP     • sodium chloride  125 mL/hr Intravenous Continuous Ariana , PA-C 125 mL/hr (02/13/23 1203)        Today, Patient Was Seen By: Robina Leal DO    **Please Note: This note may have been constructed using a voice recognition system  **

## 2023-02-13 NOTE — PLAN OF CARE
Problem: RESPIRATORY - ADULT  Goal: Achieves optimal ventilation and oxygenation  Description: INTERVENTIONS:  - Assess for changes in respiratory status  - Assess for changes in mentation and behavior  - Position to facilitate oxygenation and minimize respiratory effort  - Oxygen administered by appropriate delivery if ordered  - Initiate smoking cessation education as indicated  - Encourage broncho-pulmonary hygiene including cough, deep breathe, Incentive Spirometry  - Assess the need for suctioning and aspirate as needed  - Assess and instruct to report SOB or any respiratory difficulty  - Respiratory Therapy support as indicated  Outcome: Progressing     Problem: GASTROINTESTINAL - ADULT  Goal: Minimal or absence of nausea and/or vomiting  Description: INTERVENTIONS:  - Administer IV fluids if ordered to ensure adequate hydration  - Administer ordered antiemetic medications as needed  - Provide nonpharmacologic comfort measures as appropriate  - Advance diet as tolerated, if ordered  - Consider nutrition services referral to assist patient with adequate nutrition and appropriate food choices  Outcome: Progressing  Patient reports no nausea or vomiting     Problem: GASTROINTESTINAL - ADULT  Goal: Establish and maintain optimal ostomy function  Description: INTERVENTIONS:  - Assess bowel function  - Encourage oral fluids to ensure adequate hydration  - Administer IV fluids if ordered to ensure adequate hydration   - Administer ordered medications as needed  - Encourage mobilization and activity  - Nutrition services referral to assist patient with appropriate food choices  - Assess stoma site  - Consider wound care consult   Outcome: Progressing  Patient having appropriate output from ileostomy     Problem: PAIN - ADULT  Goal: Verbalizes/displays adequate comfort level or baseline comfort level  Description: Interventions:  - Encourage patient to monitor pain and request assistance  - Assess pain using appropriate pain scale  - Administer analgesics based on type and severity of pain and evaluate response  - Implement non-pharmacological measures as appropriate and evaluate response  - Consider cultural and social influences on pain and pain management  - Notify physician/advanced practitioner if interventions unsuccessful or patient reports new pain  Outcome: Progressing  Patient states pain well controlled with dilaudid q3 hours

## 2023-02-13 NOTE — ASSESSMENT & PLAN NOTE
Patient has a history of iron deficiency anemia  Continue iron    · hemoGlobin low but overall stable    Results from last 7 days   Lab Units 02/13/23  0500 02/12/23  0503 02/11/23  0616 02/10/23  0901   HEMOGLOBIN g/dL 9 1* 8 8* 8 7* 10 6*   HEMATOCRIT % 31 0* 29 8* 29 9* 35 0*   MCV fL 64* 64* 65* 63*

## 2023-02-13 NOTE — ASSESSMENT & PLAN NOTE
Patient had recently been hospitalized with bilateral pneumonia, treated with Unasyn  He was discharged on oral Augmentin which patient reports he was compliant and finished his course  Started to feel poorly day before presentation, felt feverish overnight took Tylenol with some water and threw up twice none bloody vomitus    - pneumonia ruled out

## 2023-02-13 NOTE — PROGRESS NOTES
Progress Note - Pulmonary   Belinda Corporal 32 y o  male MRN: [id number]  Unit/Bed#: 2 [place] [address] Encounter: [id number]    Assessment:  Bilateral lateral faint have infiltrates in lower lobes  This may have been residual from previous pneumonia in late January of this year  Not sure sepsis was related to this or possible GI source  Patient has improved with IV Zosyn  White blood cell count down to 5 53 and his procalcitonin level on 210 was 0 95 which is decreased  Ulcerative colitis    Plan:  Continue IV Zosyn day #4  Patient is able to tolerate oral antibiotics could consider change to p o  Augmentin 875 mg twice daily to complete 7-day course  We will sign off service      Subjective:   Cough is better  Still has some nausea  Objective:     Vitals: Blood pressure 111/66, pulse 100, temperature 98 2 °F (36 8 °C), resp  rate 18, height 5' 9" (1 753 m), weight 85 6 kg (188 lb 11 4 oz), SpO2 95 %  ,Body mass index is 27 87 kg/m²  No intake or output data in the 24 hours ending 02/13/23 1710    Physical Exam: Physical Exam  Vitals reviewed  Constitutional:       General: He is not in acute distress  Appearance: Normal appearance  He is well-developed  Comments: Room air O2 saturation 95%   HENT:      Head: Normocephalic  Right Ear: External ear normal       Left Ear: External ear normal       Nose: Nose normal       Mouth/Throat:      Mouth: Mucous membranes are moist       Pharynx: Oropharynx is clear  No oropharyngeal exudate  Eyes:      Conjunctiva/sclera: Conjunctivae normal       Pupils: Pupils are equal, round, and reactive to light  Neck:      Vascular: No JVD  Trachea: No tracheal deviation  Cardiovascular:      Rate and Rhythm: Normal rate and regular rhythm  Heart sounds: Normal heart sounds  Pulmonary:      Effort: Pulmonary effort is normal       Comments: Lung sounds are clear  Abdominal:      General: There is no distension  Palpations: Abdomen is soft  Musculoskeletal:      Cervical back: Neck supple  Comments: No edema   Lymphadenopathy:      Cervical: No cervical adenopathy  Skin:     General: Skin is warm and dry  Findings: No rash  Neurological:      General: No focal deficit present  Mental Status: He is alert and oriented to person, place, and time  Psychiatric:         Behavior: Behavior normal          Thought Content: Thought content normal           Labs: I have personally reviewed pertinent lab results  , ABG: No results found for: PHART, BRO5UKI, PO2ART, RBI6SPP, D5GBNIBT, BEART, SOURCE, BNP: No results found for: BNP, CBC:   Lab Results   Component Value Date    WBC 5 53 02/13/2023    HGB 9 1 (L) 02/13/2023    HCT 31 0 (L) 02/13/2023    MCV 64 (L) 02/13/2023     02/13/2023    MCH 18 9 (L) 02/13/2023    MCHC 29 4 (L) 02/13/2023    RDW 16 1 (H) 02/13/2023    MPV 8 7 (L) 02/13/2023    NRBC 0 02/10/2023   , CMP:   Lab Results   Component Value Date     10/31/2015    K 3 5 02/13/2023    K 4 2 10/26/2018    CL 99 02/13/2023     10/26/2018    CO2 29 02/13/2023    CO2 23 10/26/2018    ANIONGAP 10 0 10/31/2015    BUN 2 (L) 02/13/2023    BUN 7 10/26/2018    CREATININE 1 04 02/13/2023    CREATININE 0 9 10/31/2015    GLUCOSE 77 10/31/2015    CALCIUM 8 3 02/13/2023    CALCIUM 8 4 10/31/2015    AST 17 02/12/2023    AST 21 02/07/2018    ALT 41 02/12/2023    ALT 19 02/07/2018    ALKPHOS 97 02/12/2023    ALKPHOS 92 10/30/2015    PROT 8 1 10/30/2015    BILITOT 0 6 10/30/2015    EGFR 96 02/13/2023   , PT/INR:   Lab Results   Component Value Date    INR 0 98 02/10/2023   , Troponin: No results found for: TROPONIN    Imaging and other studies: I have personally reviewed pertinent reports     and I have personally reviewed pertinent films in PACS

## 2023-02-13 NOTE — PLAN OF CARE
Problem: RESPIRATORY - ADULT  Goal: Achieves optimal ventilation and oxygenation  Description: INTERVENTIONS:  - Assess for changes in respiratory status  - Assess for changes in mentation and behavior  - Position to facilitate oxygenation and minimize respiratory effort  - Oxygen administered by appropriate delivery if ordered  - Initiate smoking cessation education as indicated  - Encourage broncho-pulmonary hygiene including cough, deep breathe, Incentive Spirometry  - Assess the need for suctioning and aspirate as needed  - Assess and instruct to report SOB or any respiratory difficulty  - Respiratory Therapy support as indicated  Outcome: Progressing     Problem: PAIN - ADULT  Goal: Verbalizes/displays adequate comfort level or baseline comfort level  Description: Interventions:  - Encourage patient to monitor pain and request assistance  - Assess pain using appropriate pain scale  - Administer analgesics based on type and severity of pain and evaluate response  - Implement non-pharmacological measures as appropriate and evaluate response  - Consider cultural and social influences on pain and pain management  - Notify physician/advanced practitioner if interventions unsuccessful or patient reports new pain  Outcome: Progressing     Problem: INFECTION - ADULT  Goal: Absence or prevention of progression during hospitalization  Description: INTERVENTIONS:  - Assess and monitor for signs and symptoms of infection  - Monitor lab/diagnostic results  - Monitor all insertion sites, i e  indwelling lines, tubes, and drains  - Monitor endotracheal if appropriate and nasal secretions for changes in amount and color  - [place] appropriate cooling/warming therapies per order  - Administer medications as ordered  - Instruct and encourage patient and family to use good hand hygiene technique  - Identify and instruct in appropriate isolation precautions for identified infection/condition  Outcome: Progressing  Goal: Absence of fever/infection during neutropenic period  Description: INTERVENTIONS:  - Monitor WBC    Outcome: Progressing     Problem: DISCHARGE PLANNING  Goal: Discharge to home or other facility with appropriate resources  Description: INTERVENTIONS:  - Identify barriers to discharge w/patient and caregiver  - Arrange for needed discharge resources and transportation as appropriate  - Identify discharge learning needs (meds, wound care, etc )  - Arrange for interpretive services to assist at discharge as needed  - Refer to Case Management Department for coordinating discharge planning if the patient needs post-hospital services based on physician/advanced practitioner order or complex needs related to functional status, cognitive ability, or social support system  Outcome: Progressing     Problem: Knowledge Deficit  Goal: Patient/family/caregiver demonstrates understanding of disease process, treatment plan, medications, and discharge instructions  Description: Complete learning assessment and assess knowledge base    Interventions:  - Provide teaching at level of understanding  - Provide teaching via preferred learning methods  Outcome: Progressing     Problem: CARDIOVASCULAR - ADULT  Goal: Maintains optimal cardiac output and hemodynamic stability  Description: INTERVENTIONS:  - Monitor I/O, vital signs and rhythm  - Monitor for S/S and trends of decreased cardiac output  - Administer and titrate ordered vasoactive medications to optimize hemodynamic stability  - Assess quality of pulses, skin color and temperature  - Assess for signs of decreased coronary artery perfusion  - Instruct patient to report change in severity of symptoms  Outcome: Progressing  Goal: Absence of cardiac dysrhythmias or at baseline rhythm  Description: INTERVENTIONS:  - Continuous cardiac monitoring, vital signs, obtain 12 lead EKG if ordered  - Administer antiarrhythmic and heart rate control medications as ordered  - Monitor electrolytes and administer replacement therapy as ordered  Outcome: Progressing     Problem: GASTROINTESTINAL - ADULT  Goal: Minimal or absence of nausea and/or vomiting  Description: INTERVENTIONS:  - Administer IV fluids if ordered to ensure adequate hydration  - Maintain NPO status until nausea and vomiting are resolved  - Nasogastric tube if ordered  - Administer ordered antiemetic medications as needed  - Provide nonpharmacologic comfort measures as appropriate  - Advance diet as tolerated, if ordered  - Consider nutrition services referral to assist patient with adequate nutrition and appropriate food choices  Outcome: Progressing  Goal: Establish and maintain optimal ostomy function  Description: INTERVENTIONS:  - Assess bowel function  - Encourage oral fluids to ensure adequate hydration  - Administer IV fluids if ordered to ensure adequate hydration   - Administer ordered medications as needed  - Encourage mobilization and activity  - Nutrition services referral to assist patient with appropriate food choices  - Assess stoma site  - Consider wound care consult   Outcome: Progressing

## 2023-02-13 NOTE — ASSESSMENT & PLAN NOTE
Patient has history of intermittently elevated LFTs  Denies alcohol use  Suspect may be reactionary to acute illness    LFTs normalized

## 2023-02-13 NOTE — ASSESSMENT & PLAN NOTE
Patient reports abdominal pain, chronic  · History of ulcerative colitis with multiple extensive abdominal surgeries, creation of a J-pouch and ileostomy  · Follows with [facility], reports that he does have surgery planned for April for revision  · CT chest abdomen pelvis did show a few prominent small bowel loops but no obstruction  J-pouch distention noted with continued mucosal enhancement  · Surgery recommended possible transfer to his surgeon at [facility]  Indicated that they discussed this with the patient in detail and the patient does not wish to be transferred and prefers to continue with antibiotics here and advance diet, arrange his own appointment and will go to see his surgeon outpatient    · Continue prn pain meds

## 2023-02-13 NOTE — PROGRESS NOTES
Progress Note - General Surgery   Erasmo Iglesias 32 y o  male MRN: [id number]  Unit/Bed#: 2 South 202 Encounter: [id number]    Assessment:  31 y/o M w/ hx of Ulcerative Colitis, complex past surgical history, including:  --Laparoscopic total abdominal colectomy, creation of J-pouch (2015- [facility])  --Exploratory Laparotomy/abdominal washout/small bowel resection, creation of loop colostomy (10/2022- [facility]),      Slow interval improvement compared to prior days, patient's hemodynamics are stable/AVSS, labs are within normal limits except patient did have some slight hyperglycemia on BMP but likely transient finding, patient still having some persisting abdominal pain in the left lower and left upper quadrants but vomiting has resolved, mild nausea and lack of appetite, appropriate ileostomy output with liquid stool and gas    Plan:  1)   -Decrease IV fluids  -Continue to advance diet as tolerated  - will add Trey supplement  -Continuing to have patient coordinate with [facility] surgery service, may assist in discussing transfer versus discharge with expectation of readmit at future point in time  -Antibiotics per internal medicine team based on pulmonary sources but as of current no surgical need for antibiotics  -We will continue to trend abdominal exams and monitor for concerning features  -May discuss if patient has any interval worsening of abdominal pain or nausea and vomiting idea of endoscopic place rectal tube but currently we will just continue to observe   - Labs per medicine team  -DVT prophylaxis    Subjective/Objective   Chief Complaint: I am feeling slightly better but I still have some discomfort    Subjective: Patient is seen and examined at bedside  Patient denies any acute events overnight  Patient denies any fevers or chills  Patient is still not having any sort of bowel movements from J-pouch/rectum  Patient is having appropriate liquid stool and gas from loop ileostomy    Patient does still have some mild nausea but no vomiting  Patient does not feel distended or bloated  Patient is contacting [address] in order to try to coordinate next step in his plan  Patient does feel like he is slightly improved compared to when he came in  Patient does have less pain than when he first came in  Patient does not have any respiratory symptoms such as shortness of breath, chest pain, palpitations, racing heart, chest tightness, cough  Objective:     Blood pressure 111/65, pulse 72, temperature 97 5 °F (36 4 °C), temperature source Oral, resp  rate 18, height 5' 9" (1 753 m), weight 85 6 kg (188 lb 11 4 oz), SpO2 95 %  ,Body mass index is 27 87 kg/m²  No intake or output data in the 24 hours ending 02/13/23 1003    Invasive Devices     Peripheral Intravenous Line  Duration           Peripheral IV 02/12/23 Dorsal (posterior); Right Hand <1 day          Drain  Duration           Ileostomy Continent (Abdominal pouch) RLQ 84 days                Physical Exam: /65 (BP Location: Right arm)   Pulse 72   Temp 97 5 °F (36 4 °C) (Oral)   Resp 18   Ht 5' 9" (1 753 m)   Wt 85 6 kg (188 lb 11 4 oz)   SpO2 95%   BMI 27 87 kg/m²   General appearance: alert and oriented, in no acute distress  Head: Normocephalic, without obvious abnormality, atraumatic  Lungs: clear to auscultation bilaterally  Heart: regular rate and rhythm, S1, S2 normal, no murmur, click, rub or gallop  Abdomen: Soft, tenderness in the upper left and lower left, nondistended, hypoactive bowel sounds, no guarding  Skin: Skin color, texture, turgor normal  No rashes or lesions or Healing incision from exploratory laparotomy historically in 2022    Lab, Imaging and other studies:  I have personally reviewed pertinent lab results    , CBC:   Lab Results   Component Value Date    WBC 5 53 02/13/2023    HGB 9 1 (L) 02/13/2023    HCT 31 0 (L) 02/13/2023    MCV 64 (L) 02/13/2023     02/13/2023    MCH 18 9 (L) 02/13/2023    MCHC 29 4 (L) 02/13/2023    RDW 16 1 (H) 02/13/2023    MPV 8 7 (L) 02/13/2023   , CMP:   Lab Results   Component Value Date    SODIUM 134 (L) 02/13/2023    K 3 5 02/13/2023    CL 99 02/13/2023    CO2 29 02/13/2023    BUN 2 (L) 02/13/2023    CREATININE 1 04 02/13/2023    CALCIUM 8 3 02/13/2023    EGFR 96 02/13/2023     VTE Pharmacologic Prophylaxis: Enoxaparin (Lovenox)  VTE Mechanical Prophylaxis: sequential compression device

## 2023-02-13 NOTE — ASSESSMENT & PLAN NOTE
Patient has history of ulcerative colitis, follows [facility] and outpatient GI   · CT scan as noted above  · Reports increased nonbloody output in ileostomy, sj due to antibiotic use

## 2023-02-14 ENCOUNTER — TELEPHONE (OUTPATIENT)
Dept: FAMILY MEDICINE CLINIC | Facility: CLINIC | Age: 28
End: 2023-02-14

## 2023-02-14 VITALS
RESPIRATION RATE: 18 BRPM | HEART RATE: 82 BPM | HEIGHT: 69 IN | DIASTOLIC BLOOD PRESSURE: 81 MMHG | BODY MASS INDEX: 27.95 KG/M2 | WEIGHT: 188.71 LBS | SYSTOLIC BLOOD PRESSURE: 119 MMHG | TEMPERATURE: 97.9 F | OXYGEN SATURATION: 97 %

## 2023-02-14 RX ORDER — AMOXICILLIN AND CLAVULANATE POTASSIUM 875; 125 MG/1; MG/1
1 TABLET, FILM COATED ORAL EVERY 12 HOURS SCHEDULED
Qty: 4 TABLET | Refills: 0 | Status: SHIPPED | OUTPATIENT
Start: 2023-02-14 | End: 2023-02-20

## 2023-02-14 RX ADMIN — DULOXETINE HYDROCHLORIDE 60 MG: 60 CAPSULE, DELAYED RELEASE ORAL at 09:12

## 2023-02-14 RX ADMIN — HYDROMORPHONE HYDROCHLORIDE 1 MG: 1 INJECTION, SOLUTION INTRAMUSCULAR; INTRAVENOUS; SUBCUTANEOUS at 11:03

## 2023-02-14 RX ADMIN — ENOXAPARIN SODIUM 40 MG: 40 INJECTION SUBCUTANEOUS at 09:13

## 2023-02-14 RX ADMIN — HYDROMORPHONE HYDROCHLORIDE 1 MG: 1 INJECTION, SOLUTION INTRAMUSCULAR; INTRAVENOUS; SUBCUTANEOUS at 03:18

## 2023-02-14 RX ADMIN — PIPERACILLIN SODIUM AND TAZOBACTAM SODIUM 4.5 G: 4; .5 INJECTION, POWDER, LYOPHILIZED, FOR SOLUTION INTRAVENOUS at 09:16

## 2023-02-14 RX ADMIN — HYDROMORPHONE HYDROCHLORIDE 1 MG: 1 INJECTION, SOLUTION INTRAMUSCULAR; INTRAVENOUS; SUBCUTANEOUS at 07:29

## 2023-02-14 RX ADMIN — FERROUS SULFATE TAB 325 MG (65 MG ELEMENTAL FE) 325 MG: 325 (65 FE) TAB at 09:12

## 2023-02-14 RX ADMIN — PANTOPRAZOLE SODIUM 40 MG: 40 TABLET, DELAYED RELEASE ORAL at 09:12

## 2023-02-14 RX ADMIN — ONDANSETRON 4 MG: 2 INJECTION INTRAMUSCULAR; INTRAVENOUS at 11:03

## 2023-02-14 RX ADMIN — HYDROMORPHONE HYDROCHLORIDE 1 MG: 1 INJECTION, SOLUTION INTRAMUSCULAR; INTRAVENOUS; SUBCUTANEOUS at 14:09

## 2023-02-14 RX ADMIN — PIPERACILLIN SODIUM AND TAZOBACTAM SODIUM 4.5 G: 4; .5 INJECTION, POWDER, LYOPHILIZED, FOR SOLUTION INTRAVENOUS at 14:45

## 2023-02-14 RX ADMIN — PIPERACILLIN SODIUM AND TAZOBACTAM SODIUM 4.5 G: 4; .5 INJECTION, POWDER, LYOPHILIZED, FOR SOLUTION INTRAVENOUS at 03:15

## 2023-02-14 RX ADMIN — SODIUM CHLORIDE 100 ML/HR: 0.9 INJECTION, SOLUTION INTRAVENOUS at 01:38

## 2023-02-14 RX ADMIN — Medication 250 MG: at 09:12

## 2023-02-14 NOTE — NURSING NOTE
Review d/c instruction with pt  Pt verbalized understanding of instruction  Farhad and IV removed  Discharge with personal belonging

## 2023-02-14 NOTE — PLAN OF CARE
Problem: PAIN - ADULT  Goal: Verbalizes/displays adequate comfort level or baseline comfort level  Description: Interventions:  - Encourage patient to monitor pain and request assistance  - Assess pain using appropriate pain scale  - Administer analgesics based on type and severity of pain and evaluate response  - Implement non-pharmacological measures as appropriate and evaluate response  - Consider cultural and social influences on pain and pain management  - Notify physician/advanced practitioner if interventions unsuccessful or patient reports new pain  2/14/2023 1525 by Kristal Metz RN  Outcome: Adequate for Discharge  2/14/2023 1525 by Kristal Metz RN  Outcome: Adequate for Discharge  2/14/2023 1016 by Kristal Metz RN  Outcome: Progressing     Problem: INFECTION - ADULT  Goal: Absence or prevention of progression during hospitalization  Description: INTERVENTIONS:  - Assess and monitor for signs and symptoms of infection  - Monitor lab/diagnostic results  - Monitor all insertion sites, i e  indwelling lines, tubes, and drains  - Monitor endotracheal if appropriate and nasal secretions for changes in amount and color  - [place] appropriate cooling/warming therapies per order  - Administer medications as ordered  - Instruct and encourage patient and family to use good hand hygiene technique  - Identify and instruct in appropriate isolation precautions for identified infection/condition  2/14/2023 1525 by Kristal Metz RN  Outcome: Adequate for Discharge  2/14/2023 1525 by Kristal Metz RN  Outcome: Adequate for Discharge  2/14/2023 1016 by Kristal Metz RN  Outcome: Progressing

## 2023-02-14 NOTE — PLAN OF CARE
Problem: PAIN - ADULT  Goal: Verbalizes/displays adequate comfort level or baseline comfort level  Description: Interventions:  - Encourage patient to monitor pain and request assistance  - Assess pain using appropriate pain scale  - Administer analgesics based on type and severity of pain and evaluate response  - Implement non-pharmacological measures as appropriate and evaluate response  - Consider cultural and social influences on pain and pain management  - Notify physician/advanced practitioner if interventions unsuccessful or patient reports new pain  Outcome: Progressing     Problem: INFECTION - ADULT  Goal: Absence or prevention of progression during hospitalization  Description: INTERVENTIONS:  - Assess and monitor for signs and symptoms of infection  - Monitor lab/diagnostic results  - Monitor all insertion sites, i e  indwelling lines, tubes, and drains  - Monitor endotracheal if appropriate and nasal secretions for changes in amount and color  - [place] appropriate cooling/warming therapies per order  - Administer medications as ordered  - Instruct and encourage patient and family to use good hand hygiene technique  - Identify and instruct in appropriate isolation precautions for identified infection/condition  Outcome: Progressing     Problem: GASTROINTESTINAL - ADULT  Goal: Minimal or absence of nausea and/or vomiting  Description: INTERVENTIONS:  - Administer IV fluids if ordered to ensure adequate hydration  - Maintain NPO status until nausea and vomiting are resolved  - Nasogastric tube if ordered  - Administer ordered antiemetic medications as needed  - Provide nonpharmacologic comfort measures as appropriate  - Advance diet as tolerated, if ordered  - Consider nutrition services referral to assist patient with adequate nutrition and appropriate food choices  Outcome: Progressing

## 2023-02-14 NOTE — PROGRESS NOTES
Progress Note - General Surgery   Erasmo Iglesias 32 y o  male MRN: [id number]  Unit/Bed#: 2 [place] [address] Encounter: [id number]    Assessment:  33 y/o M w/ hx of Ulcerative Colitis, complex past surgical history, including:  --Laparoscopic total abdominal colectomy, creation of J-pouch (2015- [facility])  --Exploratory Laparotomy/abdominal washout/small bowel resection, creation of loop colostomy (10/2022-    [facility]),     Left sided abdominal pain improving compared to yesterday  Tolerating diet  AVSS    Plan:  Advance diet as tolerated  Trey supplement  Consider [facility] versus discharge and follow up with [facility] for readmit  Antibiotics per primary team for pulmonary source  DVT ppx  Subjective/Objective   Chief Complaint:" I am okay  I just want to change ileostomy bag today "     Subjective: Patient was seen and examined bedside  Patient reports no event overnight  Patient reports Left sided abdominal pain is improved compared to yesterday  Patient denies nausea/vomiting  Patient does reports some output from ileostomy  Objective:     Blood pressure 122/77, pulse 78, temperature 97 5 °F (36 4 °C), resp  rate 18, height 5' 9" (1 753 m), weight 85 6 kg (188 lb 11 4 oz), SpO2 99 %  ,Body mass index is 27 87 kg/m²  No intake or output data in the 24 hours ending 02/14/23 0922    Invasive Devices     Peripheral Intravenous Line  Duration           Peripheral IV 02/12/23 Dorsal (posterior); Right Hand 1 day          Drain  Duration           Ileostomy Continent (Abdominal pouch) RLQ 85 days                Physical Exam: /77   Pulse 78   Temp 97 5 °F (36 4 °C)   Resp 18   Ht 5' 9" (1 753 m)   Wt 85 6 kg (188 lb 11 4 oz)   SpO2 99%   BMI 27 87 kg/m²   General appearance: alert and oriented, in no acute distress  Head: Normocephalic, without obvious abnormality, atraumatic  Lungs: normal effort, no respiratory distress     Heart: regular rate and rhythm, S1, S2 normal, no murmur, click, rub or gallop  Abdomen: soft, mild TTP LUQ/LLQ, hypoactive bowel sound, surgical scar  Lab, Imaging and other studies:I have personally reviewed pertinent lab results    , CBC: No results found for: WBC, HGB, HCT, MCV, PLT, ADJUSTEDWBC, MCH, MCHC, RDW, MPV, NRBC, CMP: No results found for: SODIUM, K, CL, CO2, ANIONGAP, BUN, CREATININE, GLUCOSE, CALCIUM, AST, ALT, ALKPHOS, PROT, BILITOT, EGFR  VTE Pharmacologic Prophylaxis: Enoxaparin (Lovenox)  VTE Mechanical Prophylaxis: sequential compression device

## 2023-02-14 NOTE — DISCHARGE SUMMARY
Julian 43  Discharge- Mitch Reveal 1995, 32 y o  male MRN: [id number]  Unit/Bed#: 2 South 202 Encounter: [id number]  Primary Care Provider: Yassine Mcrae DO   Date and time admitted to hospital: 2/10/2023  8:42 AM    * Sepsis [facility])  Assessment & Plan  Present on admission as evidenced by temperature 102 5, heart rate 140, WBC 20 22 with suspected source being intra-abdominal as pulmonary indicated imaging actually showing improvement in regards to pneumonia  · Patient received 5 days of IV Zosyn and will be discharged on Augmentin to finish 7-day course  · Patient's diet was advanced to low fiber low residue diet which she has been tolerating well  · Continue IVF for hydration   · Leukocytosis resolved  · Blood cultures negative to date  Abdominal pain  Assessment & Plan  Patient reports abdominal pain, chronic  · History of ulcerative colitis with multiple extensive abdominal surgeries, creation of a J-pouch and ileostomy  · Follows with [facility], reports that he does have surgery planned for April for revision  · CT chest abdomen pelvis did show a few prominent small bowel loops but no obstruction  J-pouch distention noted with continued mucosal enhancement  · Surgery recommended possible transfer to his surgeon at [facility]  Indicated that they discussed this with the patient in detail and the patient does not wish to be transferred and prefers to continue with antibiotics here and advance diet, arrange his own appointment and will go to see his surgeon outpatient  · Patient's diet was advanced to low fiber low residue diet which she has been tolerating well  Patient called his primary surgeon yesterday and awaiting callback from them  · Patient added on Trey for protein supplement to optimize his nutritional status prior to surgery  Patient for excision of J-pouch with revision with new THALIA pouch creation      Anemia  Assessment & Plan  Patient has a history of iron deficiency anemia  Continue iron  · hemoGlobin low but overall stable    Results from last 7 days   Lab Units 02/13/23  0500 02/12/23  0503 02/11/23  0616 02/10/23  0901   HEMOGLOBIN g/dL 9 1* 8 8* 8 7* 10 6*   HEMATOCRIT % 31 0* 29 8* 29 9* 35 0*   MCV fL 64* 64* 65* 63*       Ankylosing spondylitis (HCC)  Assessment & Plan  Patient has a history of ankylosing spondylitis  Follows outpatient PCP  History of ulcerative colitis  Assessment & Plan  Patient has history of ulcerative colitis, follows [facility] and outpatient GI   · CT scan as noted above  · Reports increased nonbloody output in ileostomy, likley due to antibiotic use      Medical Problems     Resolved Problems  Date Reviewed: 2/14/2023          Resolved    Elevated LFTs 2/13/2023     Resolved by  Robb Painter DO    Pneumonia of both lower lobes due to infectious organism 2/13/2023     Resolved by  Robb Painter DO        Discharging Physician / Practitioner: Heena Oneil MD  PCP: Yanet Amin DO  Admission Date:   Admission Orders (From admission, onward)     Ordered        02/10/23 1109  Inpatient Admission  Once                      Discharge Date: 02/14/23    Consultations During Hospital Stay:  · Pulmonology and surgery    Outpatient Tests Requested:  · Follow-up at [facility] with primary surgical team    Complications: None    Reason for Admission: Abdominal pain with vomiting fevers chills and weakness    Hospital Course:   Chichi Salcido is a 32 y o  male patient with past medical history of ulcerative colitis, pouchitis status post tracheostomy with recent hospitalization for pneumonia who originally presented to the hospital on 2/10/2023 due to abdominal pain with fever chills with nausea and vomiting  In the ED patient met sepsis criteria  CAT scan of the chest abdomen pelvis showed bilateral groundglass densities with some GI bowel dilatation  Patient was seen in consultation with pulmonary and surgery    Patient was started on CHUCKY [place]  Patient continued to improve  Surgery team recommended the patient to be transferred to primary surgical team in [address] but patient preferred to get here before following up at the hospital   Patient was slowly started on clear liquid diet which was advanced to low fiber low residue diet  Signs of sepsis have resolved  Patient continued remained stable and was discharged home on p o  Augmentin to finish 1 week course  Patient advised to follow-up with primary surgeon in [place]  Please see above list of diagnoses and related plan for additional information  Condition at Discharge: stable    Discharge Day Visit / Exam:   Subjective: Patient is feeling well  Tolerating low fiber low residue diet well  Improved abdominal pain  Patient denies any nausea or vomiting  Vitals: Blood Pressure: 119/81 (02/14/23 1524)  Pulse: 82 (02/14/23 1524)  Temperature: 97 9 °F (36 6 °C) (02/14/23 1524)  Temp Source: Oral (02/14/23 0722)  Respirations: 18 (02/14/23 1524)  Height: 5' 9" (175 3 cm) (02/10/23 1228)  Weight - Scale: 85 6 kg (188 lb 11 4 oz) (02/10/23 1228)  SpO2: 97 % (02/14/23 1524)  Exam:   Physical Exam  Constitutional:       Appearance: Normal appearance  HENT:      Head: Normocephalic and atraumatic  Eyes:      Extraocular Movements: Extraocular movements intact  Pupils: Pupils are equal, round, and reactive to light  Cardiovascular:      Rate and Rhythm: Normal rate and regular rhythm  Heart sounds: No murmur heard  No gallop  Pulmonary:      Effort: Pulmonary effort is normal       Breath sounds: Normal breath sounds  Abdominal:      General: Bowel sounds are normal       Palpations: Abdomen is soft  Tenderness: There is no abdominal tenderness  Comments: Ileostomy   Musculoskeletal:         General: No swelling or deformity  Normal range of motion  Cervical back: Normal range of motion and neck supple  Skin:     General: Skin is warm and dry  Neurological:      General: No focal deficit present  Mental Status: He is alert  Discharge instructions/Information to patient and family:   See after visit summary for information provided to patient and family  Provisions for Follow-Up Care:  See after visit summary for information related to follow-up care and any pertinent home health orders  Disposition:   Home    Planned Readmission: No     Discharge Statement:  I spent  45minutes discharging the patient  This time was spent on the day of discharge  I had direct contact with the patient on the day of discharge  Greater than 50% of the total time was spent examining patient, answering all patient questions, arranging and discussing plan of care with patient as well as directly providing post-discharge instructions  Additional time then spent on discharge activities  Discharge Medications:  See after visit summary for reconciled discharge medications provided to patient and/or family        **Please Note: This note may have been constructed using a voice recognition system**

## 2023-02-14 NOTE — ASSESSMENT & PLAN NOTE
Present on admission as evidenced by temperature 102 5, heart rate 140, WBC 20 22 with suspected source being intra-abdominal as pulmonary indicated imaging actually showing improvement in regards to pneumonia  · Patient received 5 days of IV Zosyn and will be discharged on Augmentin to finish 7-day course  · Patient's diet was advanced to low fiber low residue diet which she has been tolerating well  · Continue IVF for hydration   · Leukocytosis resolved  · Blood cultures negative to date

## 2023-02-14 NOTE — PLAN OF CARE
Problem: RESPIRATORY - ADULT  Goal: Achieves optimal ventilation and oxygenation  Description: INTERVENTIONS:  - Assess for changes in respiratory status  - Assess for changes in mentation and behavior  - Position to facilitate oxygenation and minimize respiratory effort  - Oxygen administered by appropriate delivery if ordered  - Initiate smoking cessation education as indicated  - Encourage broncho-pulmonary hygiene including cough, deep breathe, Incentive Spirometry  - Assess the need for suctioning and aspirate as needed  - Assess and instruct to report SOB or any respiratory difficulty  - Respiratory Therapy support as indicated  Outcome: Progressing     Problem: GASTROINTESTINAL - ADULT  Goal: Minimal or absence of nausea and/or vomiting  Description: INTERVENTIONS:  - Administer IV fluids if ordered to ensure adequate hydration  - Maintain NPO status until nausea and vomiting are resolved  - Nasogastric tube if ordered  - Administer ordered antiemetic medications as needed  - Provide nonpharmacologic comfort measures as appropriate  - Advance diet as tolerated, if ordered  - Consider nutrition services referral to assist patient with adequate nutrition and appropriate food choices  Outcome: Progressing  Goal: Establish and maintain optimal ostomy function  Description: INTERVENTIONS:  - Assess bowel function  - Encourage oral fluids to ensure adequate hydration  - Administer IV fluids if ordered to ensure adequate hydration   - Administer ordered medications as needed  - Encourage mobilization and activity  - Nutrition services referral to assist patient with appropriate food choices  - Assess stoma site  - Consider wound care consult   Outcome: Progressing     Problem: PAIN - ADULT  Goal: Verbalizes/displays adequate comfort level or baseline comfort level  Description: Interventions:  - Encourage patient to monitor pain and request assistance  - Assess pain using appropriate pain scale  - Administer analgesics based on type and severity of pain and evaluate response  - Implement non-pharmacological measures as appropriate and evaluate response  - Consider cultural and social influences on pain and pain management  - Notify physician/advanced practitioner if interventions unsuccessful or patient reports new pain  Outcome: Progressing     Problem: INFECTION - ADULT  Goal: Absence or prevention of progression during hospitalization  Description: INTERVENTIONS:  - Assess and monitor for signs and symptoms of infection  - Monitor lab/diagnostic results  - Monitor all insertion sites, i e  indwelling lines, tubes, and drains  - Monitor endotracheal if appropriate and nasal secretions for changes in amount and color  - [place] appropriate cooling/warming therapies per order  - Administer medications as ordered  - Instruct and encourage patient and family to use good hand hygiene technique  - Identify and instruct in appropriate isolation precautions for identified infection/condition  Outcome: Progressing  Goal: Absence of fever/infection during neutropenic period  Description: INTERVENTIONS:  - Monitor WBC    Outcome: Progressing

## 2023-02-14 NOTE — ASSESSMENT & PLAN NOTE
Patient reports abdominal pain, chronic  · History of ulcerative colitis with multiple extensive abdominal surgeries, creation of a J-pouch and ileostomy  · Follows with [facility] [place], reports that he does have surgery planned for April for revision  · CT chest abdomen pelvis did show a few prominent small bowel loops but no obstruction  J-pouch distention noted with continued mucosal enhancement  · Surgery recommended possible transfer to his surgeon at [facility]  Indicated that they discussed this with the patient in detail and the patient does not wish to be transferred and prefers to continue with antibiotics here and advance diet, arrange his own appointment and will go to see his surgeon outpatient  · Patient's diet was advanced to low fiber low residue diet which she has been tolerating well  Patient called his primary surgeon yesterday and awaiting callback from them  · Patient added on Trey for protein supplement to optimize his nutritional status prior to surgery  Patient for excision of J-pouch with revision with new THALIA pouch creation

## 2023-02-15 ENCOUNTER — TRANSITIONAL CARE MANAGEMENT (OUTPATIENT)
Dept: FAMILY MEDICINE CLINIC | Facility: CLINIC | Age: 28
End: 2023-02-15

## 2023-02-15 ENCOUNTER — TELEPHONE (OUTPATIENT)
Dept: FAMILY MEDICINE CLINIC | Facility: CLINIC | Age: 28
End: 2023-02-15

## 2023-02-15 LAB
BACTERIA BLD CULT: NORMAL
BACTERIA BLD CULT: NORMAL

## 2023-02-15 NOTE — TELEPHONE ENCOUNTER
sw pt  Has abdominal pain, though tolerable at this time  Has fever over 100    Still on augmentin  Large production from ostomy w/o blood    Has called his surgeon in [address] at 9:30am, no response yet from them    Advised that he monitor symptoms for increasing pain or fever and go to ER if no better or gets worse, though he was recently discharged yesterday  He of course would rather not go back to ER or hospital but is aware that testing would be appropriate to determine the cause of his symptoms  He was receiving a phone call from [address] at the end of our conversation

## 2023-02-15 NOTE — UTILIZATION REVIEW
NOTIFICATION OF ADMISSION DISCHARGE   This is a Notification of Discharge from [facility]  Please be advised that this patient has been discharge from our facility  Below you will find the admission and discharge date and time including the patient’s disposition  UTILIZATION REVIEW CONTACT:  Mignon Morales  Utilization   Network Utilization Review Department  Phone: [phone] x carefully listen to the prompts  All voicemails are confidential   Email: [email or web address]     ADMISSION INFORMATION  PRESENTATION DATE: 2/10/2023  8:42 AM  OBERVATION ADMISSION DATE:   INPATIENT ADMISSION DATE: 2/10/23 11:09 AM   DISCHARGE DATE: 2/14/2023  5:55 PM   DISPOSITION:Home/Self Care    IMPORTANT INFORMATION:  Send all requests for admission clinical reviews, approved or denied determinations and any other requests to dedicated fax number below belonging to the campus where the patient is receiving treatment   List of dedicated fax numbers:  [address] DENIALS (Administrative/Medical Necessity) [phone]   [address] (Maternity/NICU/Pediatrics) [phone]   [facility] [phone]   [place] [address] [phone]   [address] [phone]   220 [facility] [phone]   90 [facility] [phone]   [address] [place] [address] 119 [phone]   [facility]  [phone] 405[address] [facility] [phone]   412 [facility] [address] [facility] [phone]

## 2023-02-15 NOTE — TELEPHONE ENCOUNTER
----- Message from Angelique Solis MD sent at 2/14/2023  6:30 PM EST -----  Thank you for allowing us to participate in the care of your patient, Dirk Austin, who was hospitalized from 2/10/2023 through 2/14/2023 with the admitting diagnosis of sepsis secondary to suspected abdominal source versus aspiration pneumonitis  Patient has been on IV Zosyn  Patient also noted to have dilated J-pouch  Patient was seen by surgery and pulmonology  Surgery recommended patient to be transferred to [facility] but patient wanted to get better here follow-up with [facility]  Patient continued to improve and was tolerating low fiber low residue diet  Patient was discharged on Augmentin with outpatient follow-up with primary surgery      If you have any additional questions or would like to discuss further, please feel free to contact me      MD [facility], Hospitalist  [phone]

## 2023-02-16 ENCOUNTER — OFFICE VISIT (OUTPATIENT)
Dept: FAMILY MEDICINE CLINIC | Facility: CLINIC | Age: 28
End: 2023-02-16

## 2023-02-16 ENCOUNTER — APPOINTMENT (OUTPATIENT)
Dept: RADIOLOGY | Facility: HOSPITAL | Age: 28
End: 2023-02-16

## 2023-02-16 ENCOUNTER — APPOINTMENT (EMERGENCY)
Dept: RADIOLOGY | Facility: HOSPITAL | Age: 28
End: 2023-02-16

## 2023-02-16 ENCOUNTER — HOSPITAL ENCOUNTER (INPATIENT)
Facility: HOSPITAL | Age: 28
LOS: 1 days | Discharge: HOME | End: 2023-02-20
Attending: EMERGENCY MEDICINE | Admitting: STUDENT IN AN ORGANIZED HEALTH CARE EDUCATION/TRAINING PROGRAM

## 2023-02-16 VITALS
OXYGEN SATURATION: 97 % | SYSTOLIC BLOOD PRESSURE: 132 MMHG | WEIGHT: 176 LBS | HEIGHT: 69 IN | HEART RATE: 124 BPM | RESPIRATION RATE: 18 BRPM | BODY MASS INDEX: 26.07 KG/M2 | DIASTOLIC BLOOD PRESSURE: 100 MMHG | TEMPERATURE: 97.7 F

## 2023-02-16 DIAGNOSIS — K91.858: Primary | ICD-10-CM

## 2023-02-16 DIAGNOSIS — R10.9 INTRACTABLE ABDOMINAL PAIN: Primary | ICD-10-CM

## 2023-02-16 DIAGNOSIS — A41.9 SEPSIS, DUE TO UNSPECIFIED ORGANISM, UNSPECIFIED WHETHER ACUTE ORGAN DYSFUNCTION PRESENT (HCC): ICD-10-CM

## 2023-02-16 DIAGNOSIS — R65.10 SIRS (SYSTEMIC INFLAMMATORY RESPONSE SYNDROME) (HCC): ICD-10-CM

## 2023-02-16 LAB
ALBUMIN SERPL BCP-MCNC: 5.3 G/DL (ref 3.5–5)
ALP SERPL-CCNC: 97 U/L (ref 34–104)
ALT SERPL W P-5'-P-CCNC: 32 U/L (ref 7–52)
ANION GAP SERPL CALCULATED.3IONS-SCNC: 14 MMOL/L (ref 4–13)
AST SERPL W P-5'-P-CCNC: 21 U/L (ref 13–39)
BACTERIA UR QL AUTO: ABNORMAL /HPF
BASOPHILS # BLD AUTO: 0.12 THOUSANDS/ÂΜL (ref 0–0.1)
BASOPHILS NFR BLD AUTO: 1 % (ref 0–1)
BILIRUB SERPL-MCNC: 0.65 MG/DL (ref 0.2–1)
BILIRUB UR QL STRIP: ABNORMAL
BUN SERPL-MCNC: 10 MG/DL (ref 5–25)
CALCIUM SERPL-MCNC: 11.2 MG/DL (ref 8.4–10.2)
CAOX CRY URNS QL MICRO: ABNORMAL /HPF
CHLORIDE SERPL-SCNC: 100 MMOL/L (ref 96–108)
CLARITY UR: CLEAR
CO2 SERPL-SCNC: 24 MMOL/L (ref 21–32)
COLOR UR: ABNORMAL
CREAT SERPL-MCNC: 1.25 MG/DL (ref 0.6–1.3)
CRP SERPL QL: 13.2 MG/L
EOSINOPHIL # BLD AUTO: 0.05 THOUSAND/ÂΜL (ref 0–0.61)
EOSINOPHIL NFR BLD AUTO: 0 % (ref 0–6)
ERYTHROCYTE [DISTWIDTH] IN BLOOD BY AUTOMATED COUNT: 18.4 % (ref 11.6–15.1)
GFR SERPL CREATININE-BSD FRML MDRD: 77 ML/MIN/1.73SQ M
GLUCOSE SERPL-MCNC: 80 MG/DL (ref 65–140)
GLUCOSE UR STRIP-MCNC: NEGATIVE MG/DL
HCT VFR BLD AUTO: 42 % (ref 36.5–49.3)
HGB BLD-MCNC: 12.6 G/DL (ref 12–17)
HGB UR QL STRIP.AUTO: NEGATIVE
IMM GRANULOCYTES # BLD AUTO: 0.09 THOUSAND/UL (ref 0–0.2)
IMM GRANULOCYTES NFR BLD AUTO: 1 % (ref 0–2)
KETONES UR STRIP-MCNC: ABNORMAL MG/DL
LACTATE SERPL-SCNC: 0.9 MMOL/L (ref 0.5–2)
LEUKOCYTE ESTERASE UR QL STRIP: NEGATIVE
LYMPHOCYTES # BLD AUTO: 2.16 THOUSANDS/ÂΜL (ref 0.6–4.47)
LYMPHOCYTES NFR BLD AUTO: 17 % (ref 14–44)
MCH RBC QN AUTO: 18.8 PG (ref 26.8–34.3)
MCHC RBC AUTO-ENTMCNC: 30 G/DL (ref 31.4–37.4)
MCV RBC AUTO: 63 FL (ref 82–98)
MONOCYTES # BLD AUTO: 0.84 THOUSAND/ÂΜL (ref 0.17–1.22)
MONOCYTES NFR BLD AUTO: 7 % (ref 4–12)
NEUTROPHILS # BLD AUTO: 9.6 THOUSANDS/ÂΜL (ref 1.85–7.62)
NEUTS SEG NFR BLD AUTO: 74 % (ref 43–75)
NITRITE UR QL STRIP: NEGATIVE
NON-SQ EPI CELLS URNS QL MICRO: ABNORMAL /HPF
NRBC BLD AUTO-RTO: 0 /100 WBCS
PH UR STRIP.AUTO: 6.5 [PH]
PLATELET # BLD AUTO: 560 THOUSANDS/UL (ref 149–390)
PMV BLD AUTO: 8.1 FL (ref 8.9–12.7)
POTASSIUM SERPL-SCNC: 3.6 MMOL/L (ref 3.5–5.3)
PROT SERPL-MCNC: 9.3 G/DL (ref 6.4–8.4)
PROT UR STRIP-MCNC: ABNORMAL MG/DL
RBC # BLD AUTO: 6.72 MILLION/UL (ref 3.88–5.62)
RBC #/AREA URNS AUTO: ABNORMAL /HPF
SODIUM SERPL-SCNC: 138 MMOL/L (ref 135–147)
SP GR UR STRIP.AUTO: <=1.005 (ref 1–1.03)
UROBILINOGEN UR QL STRIP.AUTO: 0.2 E.U./DL
WBC # BLD AUTO: 12.86 THOUSAND/UL (ref 4.31–10.16)
WBC #/AREA URNS AUTO: ABNORMAL /HPF

## 2023-02-16 RX ORDER — SODIUM CHLORIDE 9 MG/ML
125 INJECTION, SOLUTION INTRAVENOUS CONTINUOUS
Status: DISCONTINUED | OUTPATIENT
Start: 2023-02-16 | End: 2023-02-20 | Stop reason: HOSPADM

## 2023-02-16 RX ORDER — HYDROMORPHONE HCL/PF 1 MG/ML
1 SYRINGE (ML) INJECTION
Status: DISCONTINUED | OUTPATIENT
Start: 2023-02-16 | End: 2023-02-20

## 2023-02-16 RX ORDER — METRONIDAZOLE 500 MG/100ML
500 INJECTION, SOLUTION INTRAVENOUS EVERY 8 HOURS
Status: DISCONTINUED | OUTPATIENT
Start: 2023-02-16 | End: 2023-02-20 | Stop reason: HOSPADM

## 2023-02-16 RX ORDER — DULOXETIN HYDROCHLORIDE 60 MG/1
60 CAPSULE, DELAYED RELEASE ORAL DAILY
Status: DISCONTINUED | OUTPATIENT
Start: 2023-02-17 | End: 2023-02-20 | Stop reason: HOSPADM

## 2023-02-16 RX ORDER — HYDROMORPHONE HCL 110MG/55ML
2 PATIENT CONTROLLED ANALGESIA SYRINGE INTRAVENOUS ONCE
Status: COMPLETED | OUTPATIENT
Start: 2023-02-16 | End: 2023-02-16

## 2023-02-16 RX ORDER — ACETAMINOPHEN 325 MG/1
650 TABLET ORAL EVERY 6 HOURS PRN
Status: DISCONTINUED | OUTPATIENT
Start: 2023-02-16 | End: 2023-02-20 | Stop reason: HOSPADM

## 2023-02-16 RX ORDER — ONDANSETRON 2 MG/ML
4 INJECTION INTRAMUSCULAR; INTRAVENOUS ONCE
Status: COMPLETED | OUTPATIENT
Start: 2023-02-16 | End: 2023-02-16

## 2023-02-16 RX ORDER — ENOXAPARIN SODIUM 100 MG/ML
40 INJECTION SUBCUTANEOUS DAILY
Status: DISCONTINUED | OUTPATIENT
Start: 2023-02-17 | End: 2023-02-20

## 2023-02-16 RX ORDER — OXYCODONE HYDROCHLORIDE AND ACETAMINOPHEN 5; 325 MG/1; MG/1
TABLET ORAL
COMMUNITY
Start: 2023-02-08 | End: 2023-02-16

## 2023-02-16 RX ORDER — LEVOFLOXACIN 5 MG/ML
750 INJECTION, SOLUTION INTRAVENOUS EVERY 24 HOURS
Status: DISCONTINUED | OUTPATIENT
Start: 2023-02-16 | End: 2023-02-20 | Stop reason: HOSPADM

## 2023-02-16 RX ADMIN — IOHEXOL 100 ML: 350 INJECTION, SOLUTION INTRAVENOUS at 19:51

## 2023-02-16 RX ADMIN — LEVOFLOXACIN 750 MG: 750 INJECTION, SOLUTION INTRAVENOUS at 22:55

## 2023-02-16 RX ADMIN — IOHEXOL 30 ML: 300 INJECTION, SOLUTION INTRAVENOUS at 19:51

## 2023-02-16 RX ADMIN — METRONIDAZOLE 500 MG: 500 INJECTION, SOLUTION INTRAVENOUS at 22:14

## 2023-02-16 RX ADMIN — ONDANSETRON 4 MG: 2 INJECTION INTRAMUSCULAR; INTRAVENOUS at 20:06

## 2023-02-16 RX ADMIN — HYDROMORPHONE HYDROCHLORIDE 2 MG: 2 INJECTION, SOLUTION INTRAMUSCULAR; INTRAVENOUS; SUBCUTANEOUS at 16:34

## 2023-02-16 RX ADMIN — SODIUM CHLORIDE 1000 ML: 0.9 INJECTION, SOLUTION INTRAVENOUS at 16:34

## 2023-02-16 RX ADMIN — HYDROMORPHONE HYDROCHLORIDE 2 MG: 2 INJECTION, SOLUTION INTRAMUSCULAR; INTRAVENOUS; SUBCUTANEOUS at 20:10

## 2023-02-16 RX ADMIN — SODIUM CHLORIDE 125 ML/HR: 0.9 INJECTION, SOLUTION INTRAVENOUS at 22:13

## 2023-02-16 RX ADMIN — HYDROMORPHONE HYDROCHLORIDE 1 MG: 1 INJECTION, SOLUTION INTRAMUSCULAR; INTRAVENOUS; SUBCUTANEOUS at 23:12

## 2023-02-16 NOTE — PROGRESS NOTES
Assessment & Plan     1  Complications of intestinal pouch ([address] )    2  Sepsis, due to unspecified organism, unspecified whether acute organ dysfunction present [facility])  José Miguel Doll continues to have fevers, diaphoresis, tachycardia, abdominal pain post hospital discharge  He did reach out to his surgeon at [facility] who stated that it would be safer to avoid surgery at this time and to wait a couple of months  Given persistent symptoms, he will return to the emergency room  I called [address] [place] [address] ED and spoke with Dr Brianna Madsen to inform him  Subjective     Transitional Care Management Review:   Mitali Ha is a 32 y o  male here for TCM follow up  During the TCM phone call patient stated:  TCM Call     Date and time call was made  2/15/2023 10:31 AM    Hospital care reviewed  Records reviewed    Patient was hospitialized at  [address] [place] [address]    Date of Admission  02/10/23    Date of discharge  02/14/23    Diagnosis  Sepsis    Disposition  Home    Were the patients medications reviewed and updated  No  He is currently at work    Current Symptoms  Cough; Lower abdominal pain  Temp 100 8, mild shortness of breath upon exertion    Cough Severity  Mild    Lower abdominal pain severity  Mild  Started last pm  He called his surgeon at [facility] to discuss      TCM Call     [facility] issues  None    Should patient be enrolled in anticoag monitoring? No    Scheduled for follow up?   Yes    Not clinically warranted  He was re admitted to the hospital in [facility]- currently admitted     Patients specialists  Pulmonlolgist    Pulmonologist name  Dr Bebe Leija    Other specialists names  Dr Nahum France, Surgeon at [facility]    Other specialists contcat #  [facility]    Did you obtain your prescribed medications  Yes    Do you need help managing your prescriptions or medications  No    Is transportation to your appointment needed  No    I have advised the patient to call PCP with any new or worsening symptoms  I encouraged José Miguel Doll to go to the ER due to the fever and pain, as per Dr Rice's request but he declined at this time  He wants to be evaluated in the office first  He knows to go to the ER if his s/s worsen  [address] [employer] members    Support System  Family    The type of support provided  Physical; Emotional    Do you have social support  Yes, as much as I need    Are you recieving any outpatient services  No    Are you recieving home care services  No    Types of home care services  Nurse visit    Have you fallen in the last 12 months  No    Interperter language line needed  No    Counseling  Patient    Counseling topics  Activities of daily living; Importance of RX compliance; patient and family education; instructions for management; Risk factor reduction    Comments  Eneida Crawford states that he started last pm with some mild lower abd discomfort and he called his surgeon at [facility] and they explained to him about his pouch and discussed that surgery right now is not optimal  He has a low grade fever today and just took tylenol  He is currently at work  He said he has mild shortness of breath, only with exertion and they checked his lungs before he left and said his lungs are fine  He knows to go to the ER if his pain worsens or if he has dyspnea Orlando PEARCE   Eneida Crawford is a 31 yo male with a history of ankylosing spondylitis, ulcerative colitis s/p total colectomy in 7810, multiple complicated surgeries with frequent ED and hospital visits who presents today for hospital discharge follow up  He was initially admitted at [facility] from 1/25 to 1/28/23 for pneumonia then discharged  He was readmitted from 2/10 to 2/14/23 for sepsis  Source was suspected to be intra-abdominal as imaging showed that his initial pneumonia was improving  He received 5 days of IV Zosyn and was discharged on Augmentin  He currently states that he feels lousy   His last fever was earlier today at 101 3 and he took tylenol twice throughout the day last dose was 2 hours ago  He has diaphoresis, tachycardia, abdominal pain, nausea  Denies chills, chest tightness, SOB  Review of Systems   Constitutional: Positive for diaphoresis, fatigue and fever  HENT: Negative  Eyes: Negative  Respiratory: Negative  Cardiovascular: Negative  Gastrointestinal: Positive for abdominal pain and nausea  Endocrine: Negative  Genitourinary: Negative  Musculoskeletal: Negative  Neurological: Negative  Hematological: Negative  Psychiatric/Behavioral: Negative  Objective     /100   Pulse (!) 124   Temp 97 7 °F (36 5 °C)   Resp 18   Ht 5' 9" (1 753 m)   Wt 79 8 kg (176 lb)   SpO2 97%   BMI 25 99 kg/m²      Physical Exam  Constitutional:       General: He is not in acute distress  Appearance: Normal appearance  He is normal weight  He is ill-appearing  He is not toxic-appearing or diaphoretic  HENT:      Head: Normocephalic and atraumatic  Right Ear: Tympanic membrane, ear canal and external ear normal  There is no impacted cerumen  Left Ear: Tympanic membrane, ear canal and external ear normal  There is no impacted cerumen  Nose: Nose normal       Mouth/Throat:      Pharynx: No posterior oropharyngeal erythema  Eyes:      General: No scleral icterus  Right eye: No discharge  Left eye: No discharge  Extraocular Movements: Extraocular movements intact  Conjunctiva/sclera: Conjunctivae normal       Pupils: Pupils are equal, round, and reactive to light  Cardiovascular:      Rate and Rhythm: Regular rhythm  Tachycardia present  Pulses: Normal pulses  Heart sounds: Normal heart sounds  No murmur heard  No friction rub  No gallop  Pulmonary:      Effort: Pulmonary effort is normal  No respiratory distress  Breath sounds: Normal breath sounds  No stridor  No wheezing, rhonchi or rales  Chest:      Chest wall: No tenderness  Abdominal:      General: Abdomen is flat   Bowel sounds are normal  There is no distension  Palpations: Abdomen is soft  There is no mass  Tenderness: There is generalized abdominal tenderness  There is no guarding or rebound  Hernia: No hernia is present  Musculoskeletal:         General: Normal range of motion  Skin:     General: Skin is warm and dry  Neurological:      General: No focal deficit present  Mental Status: He is alert and oriented to person, place, and time  Psychiatric:         Mood and Affect: Mood normal          Behavior: Behavior normal          Thought Content:  Thought content normal          Judgment: Judgment normal        Medications have been reviewed by provider in current encounter    Estevan Oliver MD

## 2023-02-16 NOTE — ED PROVIDER NOTES
History  Chief Complaint   Patient presents with   • Fever - 9 weeks to 74 years     Fever and abdominal pain  Patient has a history of chronic ulcerative colitis and has had a complicated surgical history including total colectomy, intra-abdominal infections, ileal J-pouch formation with subsequent revision  Patient was recently in the hospital with abdominal infection without abscess as well as bilateral lower lobe pneumonias  He is still on antibiotics  Patient's had continued abdominal pain although his pneumonia symptoms has improved  He was seen in his doctor's today and sent him to the ED for evaluation  Patient feels feverish but states he takes a lot of Tylenol  He has noticed increased pain in the lower abdomen at the midline chest next to the of the ileostomy  He states he has been pouring fluid out and not vomiting so he does not think he has a recurrence of bowel obstruction  Prior to Admission Medications   Prescriptions Last Dose Informant Patient Reported? Taking?    ALPRAZolam (XANAX) 0 5 mg tablet 2023  No Yes   Si-2 po q8hrs prn anxiety   DULoxetine (CYMBALTA) 60 mg delayed release capsule 2023  No Yes   Sig: Take 1 capsule (60 mg total) by mouth daily   acetaminophen (TYLENOL) 325 mg tablet 2023  No Yes   Sig: Take 2 tablets (650 mg total) by mouth every 6 (six) hours as needed for mild pain, headaches or fever   amoxicillin-clavulanate (AUGMENTIN) 875-125 mg per tablet 2023  No Yes   Sig: Take 1 tablet by mouth every 12 (twelve) hours for 2 days   ondansetron (ZOFRAN-ODT) 4 mg disintegrating tablet 2023  No Yes   Sig: Take 1 tablet (4 mg total) by mouth every 6 (six) hours as needed for nausea for up to 3 days      Facility-Administered Medications: None       Past Medical History:   Diagnosis Date   • Ankylosing spondylitis ([facility] )    • Anxiety    • Bowel obstruction (HCC)    • Clostridium difficile colitis 2018   • Colitis    • 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[facility] ) 9/13/2018   • Pancreatitis    • Ulcerative colitis ([facility] )        Past Surgical History:   Procedure Laterality Date   • COLECTOMY TOTAL      with ileal pouch and anastemosis   • IR PICC PLACEMENT SINGLE LUMEN  3/1/2022   • TOTAL COLECTOMY         Family History   Problem Relation Age of Onset   • Lung disease Neg Hx      I have reviewed and agree with the history as documented  E-Cigarette/Vaping   • E-Cigarette Use Never User      E-Cigarette/Vaping Substances   • Nicotine No    • THC No    • CBD No    • Flavoring No    • Other No    • Unknown No      Social History     Tobacco Use   • Smoking status: Never   • Smokeless tobacco: Never   Vaping Use   • Vaping Use: Never used   Substance Use Topics   • Alcohol use: Not Currently     Comment: pt states 5 a month/socially   • Drug use: No       Review of Systems   Constitutional: Positive for appetite change and fever  Negative for chills  HENT: Negative for congestion and sore throat  Eyes: Negative for visual disturbance  Respiratory: Positive for cough  Negative for shortness of breath  Cardiovascular: Negative for chest pain  Gastrointestinal: Positive for abdominal pain, diarrhea and nausea  Negative for blood in stool  Genitourinary: Negative for decreased urine volume and dysuria  Musculoskeletal: Negative for back pain and neck pain  Skin: Negative for rash  Neurological: Positive for weakness  Negative for headaches  Hematological: Does not bruise/bleed easily  Psychiatric/Behavioral: Negative for confusion  All other systems reviewed and are negative  Physical Exam  Physical Exam  Vitals and nursing note reviewed  Constitutional:       Appearance: Normal appearance  HENT:      Head: Normocephalic  Right Ear: External ear normal       Left Ear: External ear normal       Nose: Nose normal       Mouth/Throat:      Pharynx: Oropharynx is clear     Eyes:      Conjunctiva/sclera: Conjunctivae normal  Cardiovascular:      Rate and Rhythm: Regular rhythm  Tachycardia present  Pulses: Normal pulses  Pulmonary:      Effort: Pulmonary effort is normal       Breath sounds: Normal breath sounds  Abdominal:      Palpations: Abdomen is soft  Tenderness: There is abdominal tenderness  Musculoskeletal:         General: Normal range of motion  Cervical back: Normal range of motion  Skin:     General: Skin is warm and dry  Capillary Refill: Capillary refill takes less than 2 seconds  Neurological:      General: No focal deficit present  Mental Status: He is alert and oriented to person, place, and time  Psychiatric:         Mood and Affect: Mood normal          Behavior: Behavior normal          Vital Signs  ED Triage Vitals [02/16/23 1521]   Temperature Pulse Respirations Blood Pressure SpO2   99 °F (37 2 °C) (!) 132 20 130/79 98 %      Temp src Heart Rate Source Patient Position - Orthostatic VS BP Location FiO2 (%)   -- -- -- -- --      Pain Score       8           Vitals:    02/16/23 1521 02/16/23 1700   BP: 130/79    Pulse: (!) 132 (!) 120         Visual Acuity      ED Medications  Medications   HYDROmorphone (DILAUDID) injection 2 mg (has no administration in time range)   sodium chloride 0 9 % bolus 1,000 mL (1,000 mL Intravenous New Bag 2/16/23 1634)   HYDROmorphone (DILAUDID) injection 2 mg (2 mg Intravenous Given 2/16/23 1634)   iohexol (OMNIPAQUE) 350 MG/ML injection (SINGLE-DOSE) 100 mL (100 mL Intravenous Given 2/16/23 1951)   iohexol (OMNIPAQUE) 300 mg/mL injection 30 mL (30 mL Oral Given 2/16/23 1951)       Diagnostic Studies  Results Reviewed     Procedure Component Value Units Date/Time    Blood culture #2 [[id number]] Collected: 02/16/23 1715    Lab Status:  In process Specimen: Blood from Arm, Left Updated: 02/16/23 1720    Lactic acid [[id number]]  (Normal) Collected: 02/16/23 1629    Lab Status: Final result Specimen: Blood from Arm, Right Updated: 02/16/23 1719 LACTIC ACID 0 9 mmol/L     Narrative:      Result may be elevated if tourniquet was used during collection      Comprehensive metabolic panel [[id number]]  (Abnormal) Collected: 02/16/23 1629    Lab Status: Final result Specimen: Blood from Arm, Right Updated: 02/16/23 1717     Sodium 138 mmol/L      Potassium 3 6 mmol/L      Chloride 100 mmol/L      CO2 24 mmol/L      ANION GAP 14 mmol/L      BUN 10 mg/dL      Creatinine 1 25 mg/dL      Glucose 80 mg/dL      Calcium 11 2 mg/dL      AST 21 U/L      ALT 32 U/L      Alkaline Phosphatase 97 U/L      Total Protein 9 3 g/dL      Albumin 5 3 g/dL      Total Bilirubin 0 65 mg/dL      eGFR 77 ml/min/1 73sq m     Narrative:      National Kidney Disease Foundation guidelines for Chronic Kidney Disease (CKD):   •  Stage 1 with normal or high GFR (GFR > 90 mL/min/1 73 square meters)  •  Stage 2 Mild CKD (GFR = 60-89 mL/min/1 73 square meters)  •  Stage 3A Moderate CKD (GFR = 45-59 mL/min/1 73 square meters)  •  Stage 3B Moderate CKD (GFR = 30-44 mL/min/1 73 square meters)  •  Stage 4 Severe CKD (GFR = 15-29 mL/min/1 73 square meters)  •  Stage 5 End Stage CKD (GFR <15 mL/min/1 73 square meters)  Note: GFR calculation is accurate only with a steady state creatinine    C-reactive protein [[id number]]  (Abnormal) Collected: 02/16/23 1629    Lab Status: Final result Specimen: Blood from Arm, Right Updated: 02/16/23 1717     CRP 13 2 mg/L     CBC and differential [[id number]]  (Abnormal) Collected: 02/16/23 1629    Lab Status: Final result Specimen: Blood from Arm, Right Updated: 02/16/23 1659     WBC 12 86 Thousand/uL      RBC 6 72 Million/uL      Hemoglobin 12 6 g/dL      Hematocrit 42 0 %      MCV 63 fL      MCH 18 8 pg      MCHC 30 0 g/dL      RDW 18 4 %      MPV 8 1 fL      Platelets 682 Thousands/uL      nRBC 0 /100 WBCs      Neutrophils Relative 74 %      Immat GRANS % 1 %      Lymphocytes Relative 17 %      Monocytes Relative 7 %      Eosinophils Relative 0 %      Basophils Relative 1 %      Neutrophils Absolute 9 60 Thousands/µL      Immature Grans Absolute 0 09 Thousand/uL      Lymphocytes Absolute 2 16 Thousands/µL      Monocytes Absolute 0 84 Thousand/µL      Eosinophils Absolute 0 05 Thousand/µL      Basophils Absolute 0 12 Thousands/µL     Blood culture #1 [[id number]] Collected: 02/16/23 1628    Lab Status: In process Specimen: Blood from Arm, Right Updated: 02/16/23 1658    UA (URINE) with reflex to Scope [[id number]]     Lab Status: No result Specimen: Urine                  CT abdomen pelvis with contrast    (Results Pending)              Procedures  Procedures         ED Course                                             Medical Decision Making  Patient has a complicated abdominal history  He has had progression of symptoms and disease while on antibiotics  We must evaluate for possible recurrent intra-abdominal abscess although the excessive number of CAT scans this patient has endured is of some concern  His surgeon is planning exploratory laparotomy with possible revision of his pouch in April and has told him he would not go in before that time  We will evaluate for possible abscess, possible percutaneous drainage and change in antibiotics    Amount and/or Complexity of Data Reviewed  Labs: ordered  Radiology: ordered  Risk  Prescription drug management  Disposition  Final diagnoses:   None     ED Disposition     None      Follow-up Information    None         Patient's Medications   Discharge Prescriptions    No medications on file       No discharge procedures on file      PDMP Review       Value Time User    PDMP Reviewed  Yes 2/10/2023 12:40 PM Seferino Acosta, [facility]          ED Provider  Electronically Signed by           Marialuisa Mace MD  02/16/23 7267

## 2023-02-17 LAB
ANION GAP SERPL CALCULATED.3IONS-SCNC: 8 MMOL/L (ref 4–13)
BASOPHILS # BLD AUTO: 0.11 THOUSANDS/ÂΜL (ref 0–0.1)
BASOPHILS NFR BLD AUTO: 1 % (ref 0–1)
BUN SERPL-MCNC: 10 MG/DL (ref 5–25)
CALCIUM SERPL-MCNC: 9.2 MG/DL (ref 8.4–10.2)
CHLORIDE SERPL-SCNC: 99 MMOL/L (ref 96–108)
CO2 SERPL-SCNC: 25 MMOL/L (ref 21–32)
CREAT SERPL-MCNC: 0.89 MG/DL (ref 0.6–1.3)
EOSINOPHIL # BLD AUTO: 0.28 THOUSAND/ÂΜL (ref 0–0.61)
EOSINOPHIL NFR BLD AUTO: 3 % (ref 0–6)
ERYTHROCYTE [DISTWIDTH] IN BLOOD BY AUTOMATED COUNT: 17.2 % (ref 11.6–15.1)
GFR SERPL CREATININE-BSD FRML MDRD: 115 ML/MIN/1.73SQ M
GLUCOSE P FAST SERPL-MCNC: 79 MG/DL (ref 65–99)
GLUCOSE SERPL-MCNC: 79 MG/DL (ref 65–140)
HCT VFR BLD AUTO: 35.2 % (ref 36.5–49.3)
HGB BLD-MCNC: 10.5 G/DL (ref 12–17)
IMM GRANULOCYTES # BLD AUTO: 0.14 THOUSAND/UL (ref 0–0.2)
IMM GRANULOCYTES NFR BLD AUTO: 2 % (ref 0–2)
LYMPHOCYTES # BLD AUTO: 1.89 THOUSANDS/ÂΜL (ref 0.6–4.47)
LYMPHOCYTES NFR BLD AUTO: 20 % (ref 14–44)
MCH RBC QN AUTO: 18.6 PG (ref 26.8–34.3)
MCHC RBC AUTO-ENTMCNC: 29.8 G/DL (ref 31.4–37.4)
MCV RBC AUTO: 62 FL (ref 82–98)
MONOCYTES # BLD AUTO: 0.93 THOUSAND/ÂΜL (ref 0.17–1.22)
MONOCYTES NFR BLD AUTO: 10 % (ref 4–12)
NEUTROPHILS # BLD AUTO: 6.07 THOUSANDS/ÂΜL (ref 1.85–7.62)
NEUTS SEG NFR BLD AUTO: 64 % (ref 43–75)
NRBC BLD AUTO-RTO: 0 /100 WBCS
PLATELET # BLD AUTO: 433 THOUSANDS/UL (ref 149–390)
PMV BLD AUTO: 8.7 FL (ref 8.9–12.7)
POTASSIUM SERPL-SCNC: 3.5 MMOL/L (ref 3.5–5.3)
RBC # BLD AUTO: 5.65 MILLION/UL (ref 3.88–5.62)
SODIUM SERPL-SCNC: 132 MMOL/L (ref 135–147)
WBC # BLD AUTO: 9.42 THOUSAND/UL (ref 4.31–10.16)

## 2023-02-17 RX ORDER — SACCHAROMYCES BOULARDII 250 MG
250 CAPSULE ORAL 2 TIMES DAILY
Status: DISCONTINUED | OUTPATIENT
Start: 2023-02-17 | End: 2023-02-20 | Stop reason: HOSPADM

## 2023-02-17 RX ORDER — ONDANSETRON 2 MG/ML
4 INJECTION INTRAMUSCULAR; INTRAVENOUS EVERY 8 HOURS PRN
Status: DISCONTINUED | OUTPATIENT
Start: 2023-02-17 | End: 2023-02-20 | Stop reason: HOSPADM

## 2023-02-17 RX ORDER — PANTOPRAZOLE SODIUM 40 MG/1
40 TABLET, DELAYED RELEASE ORAL
Status: DISCONTINUED | OUTPATIENT
Start: 2023-02-18 | End: 2023-02-20 | Stop reason: HOSPADM

## 2023-02-17 RX ADMIN — HYDROMORPHONE HYDROCHLORIDE 1 MG: 1 INJECTION, SOLUTION INTRAMUSCULAR; INTRAVENOUS; SUBCUTANEOUS at 02:29

## 2023-02-17 RX ADMIN — METRONIDAZOLE 500 MG: 500 INJECTION, SOLUTION INTRAVENOUS at 23:10

## 2023-02-17 RX ADMIN — ENOXAPARIN SODIUM 40 MG: 40 INJECTION SUBCUTANEOUS at 10:38

## 2023-02-17 RX ADMIN — METRONIDAZOLE 500 MG: 500 INJECTION, SOLUTION INTRAVENOUS at 16:02

## 2023-02-17 RX ADMIN — HYDROMORPHONE HYDROCHLORIDE 1 MG: 1 INJECTION, SOLUTION INTRAMUSCULAR; INTRAVENOUS; SUBCUTANEOUS at 06:17

## 2023-02-17 RX ADMIN — ACETAMINOPHEN 650 MG: 325 TABLET, FILM COATED ORAL at 20:10

## 2023-02-17 RX ADMIN — HYDROMORPHONE HYDROCHLORIDE 1 MG: 1 INJECTION, SOLUTION INTRAMUSCULAR; INTRAVENOUS; SUBCUTANEOUS at 19:45

## 2023-02-17 RX ADMIN — HYDROMORPHONE HYDROCHLORIDE 1 MG: 1 INJECTION, SOLUTION INTRAMUSCULAR; INTRAVENOUS; SUBCUTANEOUS at 23:10

## 2023-02-17 RX ADMIN — HYDROMORPHONE HYDROCHLORIDE 1 MG: 1 INJECTION, SOLUTION INTRAMUSCULAR; INTRAVENOUS; SUBCUTANEOUS at 10:32

## 2023-02-17 RX ADMIN — Medication 250 MG: at 20:04

## 2023-02-17 RX ADMIN — METRONIDAZOLE 500 MG: 500 INJECTION, SOLUTION INTRAVENOUS at 05:09

## 2023-02-17 RX ADMIN — HYDROMORPHONE HYDROCHLORIDE 1 MG: 1 INJECTION, SOLUTION INTRAMUSCULAR; INTRAVENOUS; SUBCUTANEOUS at 16:02

## 2023-02-17 RX ADMIN — LEVOFLOXACIN 750 MG: 750 INJECTION, SOLUTION INTRAVENOUS at 21:29

## 2023-02-17 RX ADMIN — DULOXETINE HYDROCHLORIDE 60 MG: 60 CAPSULE, DELAYED RELEASE ORAL at 10:38

## 2023-02-17 NOTE — PLAN OF CARE
Problem: PAIN - ADULT  Goal: Verbalizes/displays adequate comfort level or baseline comfort level  Description: Interventions:  - Encourage patient to monitor pain and request assistance  - Assess pain using appropriate pain scale  - Administer analgesics based on type and severity of pain and evaluate response  - Implement non-pharmacological measures as appropriate and evaluate response  - Consider cultural and social influences on pain and pain management  - Notify physician/advanced practitioner if interventions unsuccessful or patient reports new pain  Outcome: Progressing     Problem: INFECTION - ADULT  Goal: Absence or prevention of progression during hospitalization  Description: INTERVENTIONS:  - Assess and monitor for signs and symptoms of infection  - Monitor lab/diagnostic results  - Monitor all insertion sites, i e  indwelling lines, tubes, and drains  - Monitor endotracheal if appropriate and nasal secretions for changes in amount and color  - Plaza appropriate cooling/warming therapies per order  - Administer medications as ordered  - Instruct and encourage patient and family to use good hand hygiene technique  - Identify and instruct in appropriate isolation precautions for identified infection/condition  Outcome: Progressing  Goal: Absence of fever/infection during neutropenic period  Description: INTERVENTIONS:  - Monitor WBC    Outcome: Progressing     Problem: DISCHARGE PLANNING  Goal: Discharge to home or other facility with appropriate resources  Description: INTERVENTIONS:  - Identify barriers to discharge w/patient and caregiver  - Arrange for needed discharge resources and transportation as appropriate  - Identify discharge learning needs (meds, wound care, etc )  - Arrange for interpretive services to assist at discharge as needed  - Refer to Case Management Department for coordinating discharge planning if the patient needs post-hospital services based on physician/advanced practitioner order or complex needs related to functional status, cognitive ability, or social support system  Outcome: Progressing     Problem: Knowledge Deficit  Goal: Patient/family/caregiver demonstrates understanding of disease process, treatment plan, medications, and discharge instructions  Description: Complete learning assessment and assess knowledge base    Interventions:  - Provide teaching at level of understanding  - Provide teaching via preferred learning methods  Outcome: Progressing     Problem: GASTROINTESTINAL - ADULT  Goal: Minimal or absence of nausea and/or vomiting  Description: INTERVENTIONS:  - Administer IV fluids if ordered to ensure adequate hydration  - Maintain NPO status until nausea and vomiting are resolved  - Nasogastric tube if ordered  - Administer ordered antiemetic medications as needed  - Provide nonpharmacologic comfort measures as appropriate  - Advance diet as tolerated, if ordered  - Consider nutrition services referral to assist patient with adequate nutrition and appropriate food choices  Outcome: Progressing  Goal: Maintains or returns to baseline bowel function  Description: INTERVENTIONS:  - Assess bowel function  - Encourage oral fluids to ensure adequate hydration  - Administer IV fluids if ordered to ensure adequate hydration  - Administer ordered medications as needed  - Encourage mobilization and activity  - Consider nutritional services referral to assist patient with adequate nutrition and appropriate food choices  Outcome: Progressing  Goal: Maintains adequate nutritional intake  Description: INTERVENTIONS:  - Monitor percentage of each meal consumed  - Identify factors contributing to decreased intake, treat as appropriate  - Assist with meals as needed  - Monitor I&O, weight, and lab values if indicated  - Obtain nutrition services referral as needed  Outcome: Progressing  Goal: Establish and maintain optimal ostomy function  Description: INTERVENTIONS:  - Assess bowel function  - Encourage oral fluids to ensure adequate hydration  - Administer IV fluids if ordered to ensure adequate hydration   - Administer ordered medications as needed  - Encourage mobilization and activity  - Nutrition services referral to assist patient with appropriate food choices  - Assess stoma site  - Consider wound care consult   Outcome: Progressing  Goal: Oral mucous membranes remain intact  Description: INTERVENTIONS  - Assess oral mucosa and hygiene practices  - Implement preventative oral hygiene regimen  - Implement oral medicated treatments as ordered  - Initiate Nutrition services referral as needed  Outcome: Progressing     Problem: SKIN/TISSUE INTEGRITY - ADULT  Goal: Skin Integrity remains intact(Skin Breakdown Prevention)  Description: Assess:  -Perform Ervin assessment every shift  -Clean and moisturize skin every shift  -Inspect skin when repositioning, toileting, and assisting with ADLS  -Assess under medical devices such as Masimo every shift  -Assess extremities for adequate circulation and sensation     Bed Management:  -Have minimal linens on bed & keep smooth, unwrinkled  -Change linens as needed when moist or perspiring  -Avoid sitting or lying in one position for more than 2 hours while in bed  -Keep HOB at 30 degrees        Problem: HEMATOLOGIC - ADULT  Goal: Maintains hematologic stability  Description: INTERVENTIONS  - Assess for signs and symptoms of bleeding or hemorrhage  - Monitor labs  - Administer supportive blood products/factors as ordered and appropriate  Outcome: Progressing

## 2023-02-17 NOTE — PROGRESS NOTES
[facility]  Progress Note - Kervin Gar 1995, 32 y o  male MRN: [id number]  Unit/Bed#: [address] [place] [address] Encounter: [id number]  Primary Care Provider: Clotilde Solis DO   Date and time admitted to hospital: 2/16/2023  3:34 PM    Intractable abdominal pain  Assessment & Plan  Recurrent,    Patient with abdominal pain around ostomy site beginning last night, reports high output of ostomy   · Received dilaudid 4mg in ED  · CT a/p no acute changes  · CRP 13 2  · IVFs  · Pain regimen- due to abdominal pain and MSK/neuro nature  · Clear liquids  · GI consult    * SIRS (systemic inflammatory response syndrome) (HCC)  Assessment & Plan      POA as evidenced by fever 102 5 at home, tachycardia, WBC 12 86 - was admitted from 2/10-2/14 with sepsis and abdominal pain, initially on zosyn, sent home on augmentin x7 days  · CT a/p: Enteric contrast reaches the level the right lower quadrant ileostomy without evidence of obstruction  Redemonstrated mild wall thickening and fluid within the J-pouch similar to prior examination  · CXR negative  · UA negative  · Lactic acid 0 9  · Blood cultures-pending  · Start levaquin, flagyl for empiric coverage        Anemia  Assessment & Plan  Chronic,        Ankylosing spondylitis ([facility] [address] )  Assessment & Plan  History noted, follows with PCP    History of ulcerative colitis  Assessment & Plan  Patient has history of ulcerative colitis, follows [facility] and outpatient GI  · Plan as above        VTE Pharmacologic Prophylaxis:   Moderate Risk (Score 3-4) - Pharmacological DVT Prophylaxis Ordered: enoxaparin (Lovenox)  Patient Centered Rounds: I performed bedside rounds with nursing staff today  Discussions with Specialists or Other Care Team Provider: gi  Education and Discussions with Family / Patient: Updated  (significant other) via phone      Total Time Spent on Date of Encounter in care of patient: 45 minutes This time was spent on one or more of the following: performing physical exam; counseling and coordination of care; obtaining or reviewing history; documenting in the medical record; reviewing/ordering tests, medications or procedures; communicating with other healthcare professionals and discussing with patient's family/caregivers  Current Length of Stay: 0 day(s)  Current Patient Status: Observation   Certification Statement: The patient will continue to require additional inpatient hospital stay due to clinical course  Discharge Plan: Anticipate discharge in 24-48 hrs to home  Code Status: Level 1 - Full Code    Subjective:   Patient seen and examined at bedside, reported abdominal pain and fever was the reason he came in, patient reported trying to go to PCP but was suggested to come in due to fever     Patient stated he felt a lot better and now has appetite and wants to advance diet  Patient reported having an appointment [facility] in April and surgeon does not want to revise J-tube prior to the 6-month window due to potential complications and needing a permanent ostomy  Patient denied any other symptoms at this time besides slight abdominal pain, and high output into ostomy bag  Objective:     Vitals:   Temp (24hrs), Av °F (36 7 °C), Min:97 4 °F (36 3 °C), Max:99 °F (37 2 °C)    Temp:  [97 4 °F (36 3 °C)-99 °F (37 2 °C)] 97 4 °F (36 3 °C)  HR:  [] 65  Resp:  [18-20] 20  BP: (121-147)/() 121/78  SpO2:  [95 %-98 %] 95 %  Body mass index is 25 37 kg/m²  Input and Output Summary (last 24 hours): Intake/Output Summary (Last 24 hours) at 2023 1051  Last data filed at 2023 0509  Gross per 24 hour   Intake 750 ml   Output --   Net 750 ml       Physical Exam:   Physical Exam  Vitals and nursing note reviewed  Constitutional:       General: He is not in acute distress  Appearance: He is well-developed  HENT:      Head: Normocephalic and atraumatic     Eyes:      Conjunctiva/sclera: Conjunctivae normal  Cardiovascular:      Rate and Rhythm: Normal rate and regular rhythm  Heart sounds: No murmur heard  Pulmonary:      Effort: Pulmonary effort is normal  No respiratory distress  Breath sounds: Normal breath sounds  Abdominal:      Palpations: Abdomen is soft  Tenderness: There is abdominal tenderness  There is no guarding  Hernia: No hernia is present  Musculoskeletal:         General: No swelling  Cervical back: Neck supple  Skin:     General: Skin is warm and dry  Capillary Refill: Capillary refill takes less than 2 seconds  Neurological:      Mental Status: He is alert     Psychiatric:         Mood and Affect: Mood normal           Additional Data:     Labs:  Results from last 7 days   Lab Units 02/17/23  0512   WBC Thousand/uL 9 42   HEMOGLOBIN g/dL 10 5*   HEMATOCRIT % 35 2*   PLATELETS Thousands/uL 433*   NEUTROS PCT % 64   LYMPHS PCT % 20   MONOS PCT % 10   EOS PCT % 3     Results from last 7 days   Lab Units 02/17/23  0512 02/16/23  1629   SODIUM mmol/L 132* 138   POTASSIUM mmol/L 3 5 3 6   CHLORIDE mmol/L 99 100   CO2 mmol/L 25 24   BUN mg/dL 10 10   CREATININE mg/dL 0 89 1 25   ANION GAP mmol/L 8 14*   CALCIUM mg/dL 9 2 11 2*   ALBUMIN g/dL  --  5 3*   TOTAL BILIRUBIN mg/dL  --  0 65   ALK PHOS U/L  --  97   ALT U/L  --  32   AST U/L  --  21   GLUCOSE RANDOM mg/dL 79 80         Results from last 7 days   Lab Units 02/13/23  0913   POC GLUCOSE mg/dl 85         Results from last 7 days   Lab Units 02/16/23  1629   LACTIC ACID mmol/L 0 9       Lines/Drains:  Invasive Devices     Peripheral Intravenous Line  Duration           Peripheral IV 02/16/23 Right Antecubital <1 day    Peripheral IV 02/17/23 Right;Ventral (anterior) Forearm <1 day          Drain  Duration           Ileostomy Continent (Abdominal pouch) RLQ 88 days                      Imaging: Reviewed radiology reports from this admission including: chest xray and abdominal/pelvic CT    Recent Cultures (last 7 days):   Results from last 7 days   Lab Units 02/16/23  1715 02/16/23  1628 02/10/23  2124   BLOOD CULTURE  Received in Microbiology Lab  Culture in Progress  Received in Microbiology Lab  Culture in Progress  --    LEGIONELLA URINARY ANTIGEN   --   --  Negative       Last 24 Hours Medication List:   Current Facility-Administered Medications   Medication Dose Route Frequency Provider Last Rate   • acetaminophen  650 mg Oral Q6H PRN Jairo Linder, PA-C     • DULoxetine  60 mg Oral Daily Emmanuelle Vecdamariso, PA-C     • enoxaparin  40 mg Subcutaneous Daily Emmanuelle Vecdamariso, PA-C     • HYDROmorphone  1 mg Intravenous Q3H PRN Jairo Linder PA-C     • levofloxacin  750 mg Intravenous Q24H Emmanuelle Vecellio, PA-C 750 mg (02/16/23 2255)   • metroNIDAZOLE  500 mg Intravenous Q8H Emmanuelle Vecellio, PA-C 500 mg (02/17/23 8729)   • sodium chloride  125 mL/hr Intravenous Continuous Dewamayra Linder, PA-C 125 mL/hr (02/16/23 2213)        Today, Patient Was Seen By: Everardo Wallace MD    **Please Note: This note may have been constructed using a voice recognition system  **

## 2023-02-17 NOTE — ASSESSMENT & PLAN NOTE
POA as evidenced by fever 102 5 at home, tachycardia, WBC 12 86 - was admitted from 2/10-2/14 with sepsis and abdominal pain, initially on zosyn, sent home on augmentin x7 days  · CT a/p: Enteric contrast reaches the level the right lower quadrant ileostomy without evidence of obstruction  Redemonstrated mild wall thickening and fluid within the J-pouch similar to prior examination    · CXR negative  · UA negative  · Lactic acid 0 9  · Blood cultures-pending  · Start levaquin, flagyl for empiric coverage

## 2023-02-17 NOTE — ASSESSMENT & PLAN NOTE
Recurrent,    Patient with abdominal pain around ostomy site beginning last night, reports high output of ostomy   · Received dilaudid 4mg in ED  · CT a/p Enteric contrast reaches the level the right lower quadrant ileostomy without evidence of obstruction  2   Redemonstrated mild wall thickening and fluid within the J-pouch similar to prior examination  CRP 13 2  · IVFs  · Pain regimen- due to abdominal pain and MSK/neuro nature  · Clear liquids  · GI consult

## 2023-02-17 NOTE — ASSESSMENT & PLAN NOTE
POA as evidenced by fever 102 5 at home, tachycardia, WBC 12 86 - was admitted from 2/10-2/14 with sepsis and abdominal pain, initially on zosyn, sent home on augmentin x7 days  · CT a/p: Enteric contrast reaches the level the right lower quadrant ileostomy without evidence of obstruction  Redemonstrated mild wall thickening and fluid within the J-pouch similar to prior examination    · CXR ordered  · UA negative  · Lactic acid 0 9  · Blood cultures  · Start levaquin, flagyl for empiric coverage

## 2023-02-17 NOTE — ASSESSMENT & PLAN NOTE
Patient with abdominal pain around ostomy site beginning last night, reports high output of ostomy   · Received dilaudid 4mg in ED  · CT a/p no acute changes  · CRP 13 2  · IVFs  · Pain regimen  · Clear liquids  · GI consult

## 2023-02-17 NOTE — UTILIZATION REVIEW
Initial Clinical Review    Admission: Date/Time/Statement:   Admission Orders (From admission, onward)     Ordered        02/16/23 2056  Place in Observation  Once                      Orders Placed This Encounter   Procedures   • Place in Observation     Standing Status:   Standing     Number of Occurrences:   1     Order Specific Question:   Level of Care     Answer:   Med Surg [16]     ED Arrival Information     Expected   -    Arrival   2/16/2023 14:59    Acuity   Urgent            Means of arrival   Walk-In    Escorted by   Self    Service   Hospitalist    Admission type   Emergency            Arrival complaint   Abdominal Pain, Rapid heart Rate              Chief Complaint   Patient presents with   • Fever - 9 weeks to 74 years     Fever and abdominal pain  Initial Presentation: 32 y o  male with PMHx of ulcerative colitis, ankylosing spondylitis who presents to ED as a walk-in with fever  Pt recently admitted from 2/10-2/14 with sepsis, abdominal pain  He received zosyn which was transitioned to augmentin on d/c  He states he had a fever of 102 at home today and then took tylenol  He called his GI doctor who referred him to the ED for further eval and tx  Pt has been taking augmentin as prescribed, worried he not absorbing meds because of high output of ostomy  Also notes abdominal pain around ostomy site that began last night  Also notes nausea, chills  Tachycardic on presentation  WBC 12 86  Procal 0 95  CRP 13 2 CT a/p: Enteric contrast reaches the level the right lower quadrant ileostomy without evidence of obstruction  Redemonstrated mild wall thickening and fluid within the J-pouch similar to prior exam   Admit uner observation to M/S unit with SIRS -- Start levaquin, flagyl for empiric coverage  Blood cultures pending  IVFs  Analgesics prn  Clear liquid diet   GI consult         Date: 2/17   Day 2:     ED Triage Vitals   Temperature Pulse Respirations Blood Pressure SpO2   02/16/23 1521 02/16/23  02/16/23 1521 02/16/23 1521 02/16/23 1521   99 °F (37 2 °C) (!) 132 20 130/79 98 %      Temp Source Heart Rate Source Patient Position - Orthostatic VS BP Location FiO2 (%)   02/16/23 2143 02/16/23 2023 02/16/23 2023 02/16/23 2023 --   Oral Monitor Sitting Left arm       Pain Score       02/16/23 1521       8          Wt Readings from Last 1 Encounters:   02/16/23 77 9 kg (171 lb 12 8 oz)     Additional Vital Signs:   Date/Time Temp Pulse Resp BP MAP (mmHg) SpO2 O2 Device Patient Position - Orthostatic VS   02/16/23 21:43:34 97 7 °F (36 5 °C) 86 20 147/95 112 97 % None (Room air) Sitting   02/16/23 2023 -- 91 20 132/77 -- 97 % None (Room air) Sitting   02/16/23 1700 -- 120 Abnormal  -- -- -- 97 % -- --   02/16/23 1521 99 °F (37 2 °C) 132 Abnormal  20 130/79 -- 98 % None (Room air) --     Pertinent Labs/Diagnostic Test Results:   CT abdomen pelvis with contrast   Final Result by Tsering Tamayo MD (02/16 2024)      1  Enteric contrast reaches the level the right lower quadrant ileostomy without evidence of obstruction  2   Redemonstrated mild wall thickening and fluid within the J-pouch similar to prior examination           XR chest portable    (Results Pending)     Results from last 7 days   Lab Units 02/10/23  0936   SARS-COV-2  Negative     Results from last 7 days   Lab Units 02/17/23  0512 02/16/23  1629 02/13/23  0500 02/12/23  0503 02/11/23  0616 02/10/23  0901   WBC Thousand/uL 9 42 12 86* 5 53 6 00 11 52* 20 22*   HEMOGLOBIN g/dL 10 5* 12 6 9 1* 8 8* 8 7* 10 6*   HEMATOCRIT % 35 2* 42 0 31 0* 29 8* 29 9* 35 0*   PLATELETS Thousands/uL 433* 560* 339 290 294 349   NEUTROS ABS Thousands/µL 6 07 9 60*  --   --   --  17 04*       Results from last 7 days   Lab Units 02/17/23  0512 02/16/23  1629 02/13/23  0500 02/12/23  0503 02/11/23  0616   SODIUM mmol/L 132* 138 134* 135 138   POTASSIUM mmol/L 3 5 3 6 3 5 3 4* 3 8   CHLORIDE mmol/L 99 100 99 99 102   CO2 mmol/L 25 24 29 29 28   ANION GAP mmol/L 8 14* 6 7 8   BUN mg/dL 10 10 2* 4* 5   CREATININE mg/dL 0 89 1 25 1 04 0 95 0 90   EGFR ml/min/1 73sq m 115 77 96 107 115   CALCIUM mg/dL 9 2 11 2* 8 3 8 8 8 4   MAGNESIUM mg/dL  --   --   --   --  1 9     Results from last 7 days   Lab Units 02/16/23  1629 02/12/23  0503 02/11/23  0616 02/10/23  0901   AST U/L 21 17 28 70*   ALT U/L 32 41 48 80*   ALK PHOS U/L 97 97 117* 130*   TOTAL PROTEIN g/dL 9 3* 6 4 6 2* 7 1   ALBUMIN g/dL 5 3* 3 0* 3 0* 3 5   TOTAL BILIRUBIN mg/dL 0 65 0 48 0 68 1 12*     Results from last 7 days   Lab Units 02/13/23  0913   POC GLUCOSE mg/dl 85     Results from last 7 days   Lab Units 02/17/23  0512 02/16/23  1629 02/13/23  0500 02/12/23  0503 02/11/23  0616 02/10/23  0901   GLUCOSE RANDOM mg/dL 79 80 438* 106 57* 88     Results from last 7 days   Lab Units 02/10/23  0901   PROTIME seconds 13 1   INR  0 98   PTT seconds 30         Results from last 7 days   Lab Units 02/10/23  0901   PROCALCITONIN ng/ml 0 95*     Results from last 7 days   Lab Units 02/16/23  1629 02/10/23  0901   LACTIC ACID mmol/L 0 9 1 0     Results from last 7 days   Lab Units 02/16/23  1629   CRP mg/L 13 2*     Results from last 7 days   Lab Units 02/16/23  2136 02/10/23  2123   CLARITY UA  Clear Clear   COLOR UA  Light Yellow Yellow   SPEC GRAV UA  <=1 005 <=1 005   PH UA  6 5 6 0   GLUCOSE UA mg/dl Negative Negative   KETONES UA mg/dl 15 (1+)* Negative   BLOOD UA  Negative Negative   PROTEIN UA mg/dl Trace* Negative   NITRITE UA  Negative Negative   BILIRUBIN UA  Small* Negative   UROBILINOGEN UA E U /dl 0 2 0 2   LEUKOCYTES UA  Negative Negative   WBC UA /hpf 1-2  --    RBC UA /hpf 0-1  --    BACTERIA UA /hpf None Seen  --    EPITHELIAL CELLS WET PREP /hpf Occasional  --      Results from last 7 days   Lab Units 02/10/23  2124 02/10/23  0936   STREP PNEUMONIAE ANTIGEN, URINE  Negative  --    LEGIONELLA URINARY ANTIGEN  Negative  --    INFLUENZA A PCR   --  Negative   INFLUENZA B PCR   --  Negative   RSV PCR   --  Negative Results from last 7 days   Lab Units 02/16/23  1715 02/16/23  1628 02/10/23  0901   BLOOD CULTURE  Received in Microbiology Lab  Culture in Progress  Received in Microbiology Lab  Culture in Progress  No Growth After 5 Days  No Growth After 5 Days  ED Treatment:   Medication Administration from 02/16/2023 1459 to 02/16/2023 2140       Date/Time Order Dose Route Action     02/16/2023 1634 EST sodium chloride 0 9 % bolus 1,000 mL 1,000 mL Intravenous New Bag     02/16/2023 1634 EST HYDROmorphone (DILAUDID) injection 2 mg 2 mg Intravenous Given     02/16/2023 2010 EST HYDROmorphone (DILAUDID) injection 2 mg 2 mg Intravenous Given     02/16/2023 1951 EST iohexol (OMNIPAQUE) 300 mg/mL injection 30 mL 30 mL Oral Given     02/16/2023 2006 EST ondansetron (ZOFRAN) injection 4 mg 4 mg Intravenous Given     Past Medical History:   Diagnosis Date   • Ankylosing spondylitis ([facility])    • Anxiety    • Bowel obstruction ([facility])    • Clostridium difficile colitis 9/13/2018   • Colitis    • 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[facility] [address] ) 9/13/2018   • Pancreatitis    • Ulcerative colitis ([facility] )      Present on Admission:  • Intractable abdominal pain  • Ankylosing spondylitis ([facility])      Admitting Diagnosis: Fever [R50 9]  SIRS (systemic inflammatory response syndrome) ([facility]) [R65 10]  Intractable abdominal pain [R10 9]  Age/Sex: 32 y o  male  Admission Orders:  Scheduled Medications:  DULoxetine, 60 mg, Oral, Daily  enoxaparin, 40 mg, Subcutaneous, Daily  levofloxacin, 750 mg, Intravenous, Q24H  metroNIDAZOLE, 500 mg, Intravenous, Q8H    Continuous IV Infusions:  sodium chloride, 125 mL/hr, Intravenous, Continuous    PRN Meds:  acetaminophen, 650 mg, Oral, Q6H PRN  HYDROmorphone, 1 mg, Intravenous, Q3H PRN          Network Utilization Review Department  ATTENTION: Please call with any questions or concerns to [phone] and carefully listen to the prompts so that you are directed to the right person   All voicemails are confidential   Cami mazariegos requests for admission clinical reviews, approved or denied determinations and any other requests to dedicated fax number below belonging to the campus where the patient is receiving treatment   List of dedicated fax numbers for the Facilities:  [address] [place] [address] DENIALS (Administrative/Medical Necessity) [phone]   [address] (Maternity/NICU/Pediatrics) [phone]   [address] [phone]   [place] [address] [phone]   [address] [address] [phone]   [address] [facility] [address] [facility] [phone]

## 2023-02-17 NOTE — H&P
Stephanie 47 1995, 32 y o  male MRN: [id number]  Unit/Bed#: [address] [address] Encounter: [id number]  Primary Care Provider: Taylor Lion DO   Date and time admitted to hospital: 2/16/2023  3:34 PM    * SIRS (systemic inflammatory response syndrome) [facility])  Assessment & Plan  POA as evidenced by fever 102 5 at home, tachycardia, WBC 12 86 - was admitted from 2/10-2/14 with sepsis and abdominal pain, initially on zosyn, sent home on augmentin x7 days  · CT a/p: Enteric contrast reaches the level the right lower quadrant ileostomy without evidence of obstruction  Redemonstrated mild wall thickening and fluid within the J-pouch similar to prior examination  · CXR ordered  · UA negative  · Lactic acid 0 9  · Blood cultures  · Start levaquin, flagyl for empiric coverage        Intractable abdominal pain  Assessment & Plan  Patient with abdominal pain around ostomy site beginning last night, reports high output of ostomy   · Received dilaudid 4mg in ED  · CT a/p no acute changes  · CRP 13 2  · IVFs  · Pain regimen  · Clear liquids  · GI consult    Ankylosing spondylitis ([facility] [address] )  Assessment & Plan  History noted, follows with PCP    History of ulcerative colitis  Assessment & Plan  Patient has history of ulcerative colitis, follows [facility] and outpatient GI  · Plan as above    VTE Pharmacologic Prophylaxis:   Moderate Risk (Score 3-4) - Pharmacological DVT Prophylaxis Ordered: enoxaparin (Lovenox)  Code Status: Level 1 - Full Code   Discussion with family: Patient declined call to   Anticipated Length of Stay: Patient will be admitted on an observation basis with an anticipated length of stay of less than 2 midnights secondary to sirs, abdominal pain      Total Time Spent on Date of Encounter in care of patient: 55 minutes This time was spent on one or more of the following: performing physical exam; counseling and coordination of care; obtaining or reviewing history; documenting in the medical record; reviewing/ordering tests, medications or procedures; communicating with other healthcare professionals and discussing with patient's family/caregivers  Chief Complaint: fever    History of Present Illness:  Jaxson Hubbard is a 32 y o  male with a PMH of ulcerative colitis, ankylosing spondylitis who presents with fever  Patient was admitted from 2/10-2/14 with sepsis, abdominal pain  He received zosyn which was transitioned to augmentin on discharge  He states he had a fever of 102 at home today and then took tylenol  He called his GI doctor who told him to come to the hospital  Patient has been taking augmentin as prescribed, worried he isnt absorbing meds because of high output of ostomy  Also notes abdominal pain around ostomy site that began last night  Notes nausea, chills  Denies any chest pain, SOB, cough, dysuria, hematuria  Review of Systems:  Review of Systems   Constitutional: Positive for chills and fever  HENT: Negative for ear pain and sore throat  Respiratory: Negative for cough and shortness of breath  Cardiovascular: Negative for chest pain and palpitations  Gastrointestinal: Positive for abdominal pain and diarrhea  Negative for vomiting  Genitourinary: Negative for dysuria and hematuria  Musculoskeletal: Negative for arthralgias and back pain  Skin: Negative for color change and rash  Neurological: Negative for seizures and syncope         Past Medical and Surgical History:   Past Medical History:   Diagnosis Date   • Ankylosing spondylitis ([address] )    • Anxiety    • Bowel obstruction ([address] )    • Clostridium difficile colitis 9/13/2018   • Colitis    • 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[address] ) 9/13/2018   • Pancreatitis    • Ulcerative colitis ([address] )        Past Surgical History:   Procedure Laterality Date   • COLECTOMY TOTAL      with ileal pouch and anastemosis   • IR PICC PLACEMENT SINGLE LUMEN  3/1/2022   • TOTAL COLECTOMY Meds/Allergies:  Prior to Admission medications    Medication Sig Start Date End Date Taking? Authorizing Provider   acetaminophen (TYLENOL) 325 mg tablet Take 2 tablets (650 mg total) by mouth every 6 (six) hours as needed for mild pain, headaches or fever 1/24/23  Yes Darlin Martinez, DO   ALPRAZolam (XANAX) 0 5 mg tablet 1-2 po q8hrs prn anxiety 2/7/23  Yes Ashley Rosa, DO   amoxicillin-clavulanate (AUGMENTIN) 875-125 mg per tablet Take 1 tablet by mouth every 12 (twelve) hours for 2 days 2/14/23 2/16/23 Yes Eryn Maki MD   DULoxetine (CYMBALTA) 60 mg delayed release capsule Take 1 capsule (60 mg total) by mouth daily 2/7/23  Yes Ashley Rosa, DO   ondansetron (ZOFRAN-ODT) 4 mg disintegrating tablet Take 1 tablet (4 mg total) by mouth every 6 (six) hours as needed for nausea for up to 3 days 1/18/23 2/16/23 Yes Cassie King, DO   oxyCODONE-acetaminophen (PERCOCET) 5-325 mg per tablet  2/8/23 2/16/23  Historical Provider, MD   saccharomyces boulardii (FLORASTOR) 250 mg capsule Take 1 capsule (250 mg total) by mouth 2 (two) times a day 1/28/23 2/16/23  Celioky Judge, DO     I have reviewed home medications with patient personally  Allergies:    Allergies   Allergen Reactions   • Cefazolin Hives     Other reaction(s): Arrythmia (Abnormal Heartbeat), Rash Maculopapular   • Mesalamine GI Intolerance     Other reaction(s): Pancreatitis  Annotation - [id number]: pancreatitis   • Wound Dressings Rash     Col[employer] ostomy Products        Social History:  Marital Status: Single   Occupation:   Patient Pre-hospital Living Situation: Home  Patient Pre-hospital Level of Mobility: walks  Patient Pre-hospital Diet Restrictions:   Substance Use History:   Social History     Substance and Sexual Activity   Alcohol Use Not Currently    Comment: pt states 5 a month/socially     Social History     Tobacco Use   Smoking Status Never   Smokeless Tobacco Never     Social History     Substance and Sexual Activity Drug Use No       Family History:  Family History   Problem Relation Age of Onset   • Lung disease Neg Hx        Physical Exam:     Vitals:   Blood Pressure: 147/95 (02/16/23 2143)  Pulse: 86 (02/16/23 2143)  Temperature: 97 7 °F (36 5 °C) (02/16/23 2143)  Temp Source: Oral (02/16/23 2143)  Respirations: 20 (02/16/23 2143)  Height: 5' 9" (175 3 cm) (02/16/23 2144)  Weight - Scale: 77 9 kg (171 lb 12 8 oz) (02/16/23 2144)  SpO2: 97 % (02/16/23 2143)    Physical Exam  Vitals reviewed  Constitutional:       General: He is not in acute distress  Appearance: He is well-developed  HENT:      Head: Normocephalic and atraumatic  Eyes:      Conjunctiva/sclera: Conjunctivae normal    Cardiovascular:      Rate and Rhythm: Normal rate and regular rhythm  Heart sounds: No murmur heard  Pulmonary:      Effort: Pulmonary effort is normal  No respiratory distress  Breath sounds: Normal breath sounds  Abdominal:      Palpations: Abdomen is soft  Tenderness: There is abdominal tenderness  Comments: Mild abdominal tenderness around ostomy site   Musculoskeletal:         General: No swelling  Skin:     General: Skin is warm and dry  Capillary Refill: Capillary refill takes less than 2 seconds  Neurological:      Mental Status: He is alert     Psychiatric:         Mood and Affect: Mood normal           Additional Data:     Lab Results:  Results from last 7 days   Lab Units 02/16/23  1629   WBC Thousand/uL 12 86*   HEMOGLOBIN g/dL 12 6   HEMATOCRIT % 42 0   PLATELETS Thousands/uL 560*   NEUTROS PCT % 74   LYMPHS PCT % 17   MONOS PCT % 7   EOS PCT % 0     Results from last 7 days   Lab Units 02/16/23  1629   SODIUM mmol/L 138   POTASSIUM mmol/L 3 6   CHLORIDE mmol/L 100   CO2 mmol/L 24   BUN mg/dL 10   CREATININE mg/dL 1 25   ANION GAP mmol/L 14*   CALCIUM mg/dL 11 2*   ALBUMIN g/dL 5 3*   TOTAL BILIRUBIN mg/dL 0 65   ALK PHOS U/L 97   ALT U/L 32   AST U/L 21   GLUCOSE RANDOM mg/dL 80     Results from last 7 days   Lab Units 02/10/23  0901   INR  0 98     Results from last 7 days   Lab Units 02/13/23  0913   POC GLUCOSE mg/dl 85         Results from last 7 days   Lab Units 02/16/23  1629 02/10/23  0901   LACTIC ACID mmol/L 0 9 1 0   PROCALCITONIN ng/ml  --  0 95*       Lines/Drains:  Invasive Devices     Peripheral Intravenous Line  Duration           Peripheral IV 02/16/23 Right Antecubital <1 day          Drain  Duration           Ileostomy Continent (Abdominal pouch) RLQ 87 days                    Imaging: Reviewed radiology reports from this admission including: abdominal/pelvic CT  CT abdomen pelvis with contrast   Final Result by Raisa Hernández MD (02/16 2024)      1  Enteric contrast reaches the level the right lower quadrant ileostomy without evidence of obstruction  2   Redemonstrated mild wall thickening and fluid within the J-pouch similar to prior examination  Workstation performed: [id number]         XR chest portable    (Results Pending)       EKG and Other Studies Reviewed on Admission:   · EKG: No EKG obtained  ** Please Note: This note has been constructed using a voice recognition system   **

## 2023-02-18 LAB — C DIFF TOX GENS STL QL NAA+PROBE: NEGATIVE

## 2023-02-18 RX ADMIN — ENOXAPARIN SODIUM 40 MG: 40 INJECTION SUBCUTANEOUS at 09:06

## 2023-02-18 RX ADMIN — Medication 250 MG: at 17:24

## 2023-02-18 RX ADMIN — METRONIDAZOLE 500 MG: 500 INJECTION, SOLUTION INTRAVENOUS at 23:41

## 2023-02-18 RX ADMIN — HYDROMORPHONE HYDROCHLORIDE 1 MG: 1 INJECTION, SOLUTION INTRAMUSCULAR; INTRAVENOUS; SUBCUTANEOUS at 16:26

## 2023-02-18 RX ADMIN — LEVOFLOXACIN 750 MG: 750 INJECTION, SOLUTION INTRAVENOUS at 21:40

## 2023-02-18 RX ADMIN — METRONIDAZOLE 500 MG: 500 INJECTION, SOLUTION INTRAVENOUS at 16:22

## 2023-02-18 RX ADMIN — HYDROMORPHONE HYDROCHLORIDE 1 MG: 1 INJECTION, SOLUTION INTRAMUSCULAR; INTRAVENOUS; SUBCUTANEOUS at 02:31

## 2023-02-18 RX ADMIN — SODIUM CHLORIDE 125 ML/HR: 0.9 INJECTION, SOLUTION INTRAVENOUS at 02:28

## 2023-02-18 RX ADMIN — DULOXETINE HYDROCHLORIDE 60 MG: 60 CAPSULE, DELAYED RELEASE ORAL at 09:06

## 2023-02-18 RX ADMIN — HYDROMORPHONE HYDROCHLORIDE 1 MG: 1 INJECTION, SOLUTION INTRAMUSCULAR; INTRAVENOUS; SUBCUTANEOUS at 20:20

## 2023-02-18 RX ADMIN — HYDROMORPHONE HYDROCHLORIDE 1 MG: 1 INJECTION, SOLUTION INTRAMUSCULAR; INTRAVENOUS; SUBCUTANEOUS at 09:59

## 2023-02-18 RX ADMIN — HYDROMORPHONE HYDROCHLORIDE 1 MG: 1 INJECTION, SOLUTION INTRAMUSCULAR; INTRAVENOUS; SUBCUTANEOUS at 23:43

## 2023-02-18 RX ADMIN — HYDROMORPHONE HYDROCHLORIDE 1 MG: 1 INJECTION, SOLUTION INTRAMUSCULAR; INTRAVENOUS; SUBCUTANEOUS at 13:24

## 2023-02-18 RX ADMIN — ONDANSETRON 4 MG: 2 INJECTION INTRAMUSCULAR; INTRAVENOUS at 02:31

## 2023-02-18 RX ADMIN — METRONIDAZOLE 500 MG: 500 INJECTION, SOLUTION INTRAVENOUS at 09:07

## 2023-02-18 RX ADMIN — PANTOPRAZOLE SODIUM 40 MG: 40 TABLET, DELAYED RELEASE ORAL at 05:03

## 2023-02-18 RX ADMIN — HYDROMORPHONE HYDROCHLORIDE 1 MG: 1 INJECTION, SOLUTION INTRAMUSCULAR; INTRAVENOUS; SUBCUTANEOUS at 06:53

## 2023-02-18 RX ADMIN — Medication 250 MG: at 09:06

## 2023-02-18 RX ADMIN — SODIUM CHLORIDE 125 ML/HR: 0.9 INJECTION, SOLUTION INTRAVENOUS at 23:42

## 2023-02-18 NOTE — CONSULTS
Consultation - [facility]enter[facility]   Euna Goodpasture 32 y o  male MRN: [id number]  Unit/Bed#: [address] Encounter: [id number]        Inpatient consult to gastroenterology  Consult performed by: KY Marsh  Consult ordered by: Vidhi Mendosa PA-C          Reason for Consult / Principal Problem:     Abdominal pain/SIRS      ASSESSMENT AND PLAN:      This is a 32 y o  male with ankylosing spondylitis, thalassemia minor, UC s/p colectomy with J-pouch formation, pouchitis, s/p ileostomy 10/13/22 c/b proximal limb evisceration, purulent ascites & sepsis requiring revision/washout 10/18, and several recent admissions who presented 2/16 with fever, high output ileostomy, and stabbing/burning mid lower abdominal pain  1  Abdominal pain/SIRs  2  High output ileostomy  Pt with similar presentation during recent admission 2/10-2/14 and was treated with IV Zosyn for suspected intra-abdominal source of infection with improvement in abdominal pain/nausea, but pt reports he subsequently had increased ileostomy output  He was doing well for 2 days after admission, but then developed recurrent fever, nausea, and abdominal pain which feels like stabbing/burning in mid lower abdomen and feels this is possibly due to not absorbing the Augmentin he was discharged on w/ high output ileostomy  CTAP with contrast showed enteric contrast reaching the level of the right lower quadrant ileostomy without evidence of obstruction  Redemonstrated mild wall thickening and fluid within the J-pouch similar to prior examination  He is scheduled for surgery in April for [address] [facility] surgery at [facility] to advance to low fiber/low residue, lactose restricted diet  -Continue antibiotics  -Check stool studies to include C diff PCR, enteric panel, O+P  -Start Probiotic  Batsheva Disla worked well for pt previously for high output, but unable to tolerate due to cramping so will avoid this   If C diff is negative then can trial Lomotil prior to meals  -Monitor stool output  -Continue anti emetics as needed  Pain medication per primary team   -Continue supportive care with IV hydration and repletion of electrolytes as needed  -Monitor CBC, CMP  -Follow up Blood cultures  -If no improvement in symptoms, can consider ileoscopy on Monday for further evaluation  2   Erosive gastritis, duodenitis  Seen on last EGD in January 2023 suspected secondary to NSAID use  Biopsies negative for H  Pylori   -Continue pantoprazole 40 mg daily    Thank you for the consultation  Case discussed with Dr Okeefe   ______________________________________________________________________    HPI:  Darrin Goodpasture is a 32 y o  male with ankylosing spondylitis, thalassemia minor, UC s/p colectomy with J-pouch formation, pouchitis, s/p ileostomy 10/13/22 c/b proximal limb evisceration, purulent ascites & sepsis requiring revision/washout 10/18, and several recent admissions who presented 2/16 with fever, high output ileostomy, and stabbing/burning mid lower abdominal pain  He was admitted recently 2/10-2/14 due to sepsis suspected due to intra-abdominal source as imaging was showing improvement of prior pneumonia and was treated with 5 days of IV Zosyn and discharged on 2 days of Augmentin at home  He was seen by the surgical team during admission who [facility] however pt preferred to follow up with them after hospitalization  He reports when IV Zosyn was started last admission he noted increased ileostomy output which continued at home  He was doing OK for 2 days after discharge but then noted recurrent fever, nausea, and abdominal pain  He saw his PCP who sent him to the ED  In the ED, he was tachycardic 120s-130s with temperature of 99  WBC 12 8  CRP 13 2 down from 43 last month  Chest x-ray was unremarkable    CT abdomen pelvis with contrast showed enteric contrast reaching the level of the right lower quadrant ileostomy without evidence of obstruction  Redemonstrated mild wall thickening and fluid within the J-pouch similar to prior examination  Blood cultures were taken and he was given 1 L bolus, Zofran, Dilaudid in the ED and started on Levaquin and Flagyl  He reports that abdominal pain is improved with IV pain medication, nausea has subsided and he is tolerating clear liquid diet and would like diet to be advanced  Ilestomy output is still liquid/watery but slightly less voluminous today, denies any black or bloody stool  REVIEW OF SYSTEMS:    CONSTITUTIONAL: +fever, chills, rigors, and weight loss  HEENT: No earache or tinnitus  Denies hearing loss or visual disturbances  CARDIOVASCULAR: No chest pain or palpitations  RESPIRATORY: Denies any cough, hemoptysis, shortness of breath or dyspnea on exertion  GASTROINTESTINAL: As noted in the History of Present Illness  GENITOURINARY: No problems with urination  Denies any hematuria or dysuria  NEUROLOGIC: No dizziness or vertigo, denies headaches  MUSCULOSKELETAL: Denies any muscle or joint pain  SKIN: Denies skin rashes or itching  ENDOCRINE: Denies excessive thirst  Denies intolerance to heat or cold  PSYCHOSOCIAL: Denies depression or anxiety  Denies any recent memory loss         Historical Information   Past Medical History:   Diagnosis Date   • Ankylosing spondylitis ([facility] [address] )    • Anxiety    • Bowel obstruction ([facility] [address] )    • Clostridium difficile colitis 9/13/2018   • Colitis    • 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[facility] [address] ) 9/13/2018   • Pancreatitis    • Ulcerative colitis ([facility] [address] )      Past Surgical History:   Procedure Laterality Date   • COLECTOMY TOTAL      with ileal pouch and anastemosis   • IR PICC PLACEMENT SINGLE LUMEN  3/1/2022   • TOTAL COLECTOMY       Social History   Social History     Substance and Sexual Activity   Alcohol Use Not Currently    Comment: pt states 5 a month/socially     Social History     Substance and Sexual Activity Drug Use No     Social History     Tobacco Use   Smoking Status Never   Smokeless Tobacco Never     Family History   Problem Relation Age of Onset   • Lung disease Neg Hx        Meds/Allergies     Medications Prior to Admission   Medication   • acetaminophen (TYLENOL) 325 mg tablet   • ALPRAZolam (XANAX) 0 5 mg tablet   • [] amoxicillin-clavulanate (AUGMENTIN) 875-125 mg per tablet   • DULoxetine (CYMBALTA) 60 mg delayed release capsule   • ondansetron (ZOFRAN-ODT) 4 mg disintegrating tablet     Current Facility-Administered Medications   Medication Dose Route Frequency   • acetaminophen (TYLENOL) tablet 650 mg  650 mg Oral Q6H PRN   • DULoxetine (CYMBALTA) delayed release capsule 60 mg  60 mg Oral Daily   • enoxaparin (LOVENOX) subcutaneous injection 40 mg  40 mg Subcutaneous Daily   • HYDROmorphone (DILAUDID) injection 1 mg  1 mg Intravenous Q3H PRN   • levofloxacin (LEVAQUIN) IVPB (premix in dextrose) 750 mg 150 mL  750 mg Intravenous Q24H   • metroNIDAZOLE (FLAGYL) IVPB (premix) 500 mg 100 mL  500 mg Intravenous Q8H   • sodium chloride 0 9 % infusion  125 mL/hr Intravenous Continuous       Allergies   Allergen Reactions   • Cefazolin Hives     Other reaction(s): Arrythmia (Abnormal Heartbeat), Rash Maculopapular   • Mesalamine GI Intolerance     Other reaction(s): Pancreatitis  [facility] - [id number]: pancreatitis   • Wound Dressings Rash     Coloplast ostomy Products            Objective     Blood pressure 134/92, pulse 76, temperature 97 9 °F (36 6 °C), resp  rate 18, height 5' 9" (1 753 m), weight 77 9 kg (171 lb 12 8 oz), SpO2 95 %  Body mass index is 25 37 kg/m²  Intake/Output Summary (Last 24 hours) at 2023 193  Last data filed at 2023 0716  Gross per 24 hour   Intake 750 ml   Output --   Net 750 ml         PHYSICAL EXAM:      General Appearance:   Alert, cooperative, no distress   HEENT:   Normocephalic, atraumatic, anicteric       Neck:  Supple, symmetrical, trachea midline Lungs:   Clear to auscultation bilaterally; no rales, rhonchi or wheezing; respirations unlabored    Heart[de-identified]   Regular rate and rhythm; no murmur, rub, or gallop     Abdomen:   Soft, mid lower abdomen tender, non-distended; normal bowel sounds; no masses, no organomegaly; ileostomy in place with liquid stool    Genitalia:   Deferred    Rectal:   Deferred    Extremities:  No cyanosis, clubbing or edema    Pulses:  2+ and symmetric all extremities    Skin:  No jaundice, rashes, or lesions    Lymph nodes:  No palpable cervical lymphadenopathy        Lab Results:   Admission on 02/16/2023   Component Date Value   • WBC 02/16/2023 12 86 (H)    • RBC 02/16/2023 6 72 (H)    • Hemoglobin 02/16/2023 12 6    • Hematocrit 02/16/2023 42 0    • MCV 02/16/2023 63 (L)    • MCH 02/16/2023 18 8 (L)    • MCHC 02/16/2023 30 0 (L)    • RDW 02/16/2023 18 4 (H)    • MPV 02/16/2023 8 1 (L)    • Platelets 77/44/1382 560 (H)    • nRBC 02/16/2023 0    • Neutrophils Relative 02/16/2023 74    • Immat GRANS % 02/16/2023 1    • Lymphocytes Relative 02/16/2023 17    • Monocytes Relative 02/16/2023 7    • Eosinophils Relative 02/16/2023 0    • Basophils Relative 02/16/2023 1    • Neutrophils Absolute 02/16/2023 9 60 (H)    • Immature Grans Absolute 02/16/2023 0 09    • Lymphocytes Absolute 02/16/2023 2 16    • Monocytes Absolute 02/16/2023 0 84    • Eosinophils Absolute 02/16/2023 0 05    • Basophils Absolute 02/16/2023 0 12 (H)    • Sodium 02/16/2023 138    • Potassium 02/16/2023 3 6    • Chloride 02/16/2023 100    • CO2 02/16/2023 24    • ANION GAP 02/16/2023 14 (H)    • BUN 02/16/2023 10    • Creatinine 02/16/2023 1 25    • Glucose 02/16/2023 80    • Calcium 02/16/2023 11 2 (H)    • AST 02/16/2023 21    • ALT 02/16/2023 32    • Alkaline Phosphatase 02/16/2023 97    • Total Protein 02/16/2023 9 3 (H)    • Albumin 02/16/2023 5 3 (H)    • Total Bilirubin 02/16/2023 0 65    • eGFR 02/16/2023 77    • CRP 02/16/2023 13 2 (H)    • LACTIC ACID 02/16/2023 0 9    • Blood Culture 02/16/2023 Received in Microbiology Lab  Culture in Progress  • Blood Culture 02/16/2023 Received in Microbiology Lab  Culture in Progress      • Color, UA 02/16/2023 Light Yellow    • Clarity, UA 02/16/2023 Clear    • Specific Gravity, UA 02/16/2023 <=1 005    • pH, UA 02/16/2023 6 5    • Leukocytes, UA 02/16/2023 Negative    • Nitrite, UA 02/16/2023 Negative    • Protein, UA 02/16/2023 Trace (A)    • Glucose, UA 02/16/2023 Negative    • Ketones, UA 02/16/2023 15 (1+) (A)    • Urobilinogen, UA 02/16/2023 0 2    • Bilirubin, UA 02/16/2023 Small (A)    • Occult Blood, UA 02/16/2023 Negative    • RBC, UA 02/16/2023 0-1    • WBC, UA 02/16/2023 1-2    • Epithelial Cells 02/16/2023 Occasional    • Bacteria, UA 02/16/2023 None Seen    • Ca Oxalate Allegra, UA 02/16/2023 Occasional (A)    • Sodium 02/17/2023 132 (L)    • Potassium 02/17/2023 3 5    • Chloride 02/17/2023 99    • CO2 02/17/2023 25    • ANION GAP 02/17/2023 8    • BUN 02/17/2023 10    • Creatinine 02/17/2023 0 89    • Glucose 02/17/2023 79    • Glucose, Fasting 02/17/2023 79    • Calcium 02/17/2023 9 2    • eGFR 02/17/2023 115    • WBC 02/17/2023 9 42    • RBC 02/17/2023 5 65 (H)    • Hemoglobin 02/17/2023 10 5 (L)    • Hematocrit 02/17/2023 35 2 (L)    • MCV 02/17/2023 62 (L)    • MCH 02/17/2023 18 6 (L)    • MCHC 02/17/2023 29 8 (L)    • RDW 02/17/2023 17 2 (H)    • MPV 02/17/2023 8 7 (L)    • Platelets 96/09/7720 433 (H)    • nRBC 02/17/2023 0    • Neutrophils Relative 02/17/2023 64    • Immat GRANS % 02/17/2023 2    • Lymphocytes Relative 02/17/2023 20    • Monocytes Relative 02/17/2023 10    • Eosinophils Relative 02/17/2023 3    • Basophils Relative 02/17/2023 1    • Neutrophils Absolute 02/17/2023 6 07    • Immature Grans Absolute 02/17/2023 0 14    • Lymphocytes Absolute 02/17/2023 1 89    • Monocytes Absolute 02/17/2023 0 93    • Eosinophils Absolute 02/17/2023 0 28    • Basophils Absolute 02/17/2023 0 11 (H) Imaging Studies: I have personally reviewed pertinent imaging studies

## 2023-02-18 NOTE — UTILIZATION REVIEW
Continued Stay Review    Date: 2/18/23                          Current Patient Class: observation  Current Level of Care: med surg  HPI:27 y o  male initially admitted on 2/16/23      Assessment/Plan:   Remains on double IVABT & IVF at this time  Continues to require IV Dilaudid for pain control  Persistent watery stools      Vital Signs:   /94   Pulse 81   Temp 97 5 °F (36 4 °C)   Resp 18   Ht 5' 9" (1 753 m)   Wt 77 9 kg (171 lb 12 8 oz)   SpO2 94%   BMI 25 37 kg/m²     Pertinent Labs/Diagnostic Results:       Results from last 7 days   Lab Units 02/17/23  0512 02/16/23  1629 02/13/23  0500 02/12/23  0503   WBC Thousand/uL 9 42 12 86* 5 53 6 00   HEMOGLOBIN g/dL 10 5* 12 6 9 1* 8 8*   HEMATOCRIT % 35 2* 42 0 31 0* 29 8*   PLATELETS Thousands/uL 433* 560* 339 290   NEUTROS ABS Thousands/µL 6 07 9 60*  --   --      Results from last 7 days   Lab Units 02/17/23  0512 02/16/23  1629 02/13/23  0500 02/12/23  0503   SODIUM mmol/L 132* 138 134* 135   POTASSIUM mmol/L 3 5 3 6 3 5 3 4*   CHLORIDE mmol/L 99 100 99 99   CO2 mmol/L 25 24 29 29   ANION GAP mmol/L 8 14* 6 7   BUN mg/dL 10 10 2* 4*   CREATININE mg/dL 0 89 1 25 1 04 0 95   EGFR ml/min/1 73sq m 115 77 96 107   CALCIUM mg/dL 9 2 11 2* 8 3 8 8     Results from last 7 days   Lab Units 02/16/23  1629 02/12/23  0503   AST U/L 21 17   ALT U/L 32 41   ALK PHOS U/L 97 97   TOTAL PROTEIN g/dL 9 3* 6 4   ALBUMIN g/dL 5 3* 3 0*   TOTAL BILIRUBIN mg/dL 0 65 0 48     Results from last 7 days   Lab Units 02/13/23  0913   POC GLUCOSE mg/dl 85     Results from last 7 days   Lab Units 02/17/23  0512 02/16/23  1629 02/13/23  0500 02/12/23  0503   GLUCOSE RANDOM mg/dL 79 80 438* 106     Results from last 7 days   Lab Units 02/16/23  1629   LACTIC ACID mmol/L 0 9     Results from last 7 days   Lab Units 02/16/23  1629   CRP mg/L 13 2*     Results from last 7 days   Lab Units 02/16/23  2136   CLARITY UA  Clear   COLOR UA  Light Yellow   SPEC GRAV UA  <=1 005   PH UA  6 5 GLUCOSE UA mg/dl Negative   KETONES UA mg/dl 15 (1+)*   BLOOD UA  Negative   PROTEIN UA mg/dl Trace*   NITRITE UA  Negative   BILIRUBIN UA  Small*   UROBILINOGEN UA E U /dl 0 2   LEUKOCYTES UA  Negative   WBC UA /hpf 1-2   RBC UA /hpf 0-1   BACTERIA UA /hpf None Seen   EPITHELIAL CELLS WET PREP /hpf Occasional     Results from last 7 days   Lab Units 02/16/23  1715 02/16/23  1628   BLOOD CULTURE  No Growth at 24 hrs  No Growth at 24 hrs  Medications:   Scheduled Medications:  DULoxetine, 60 mg, Oral, Daily  enoxaparin, 40 mg, Subcutaneous, Daily  levofloxacin, 750 mg, Intravenous, Q24H  metroNIDAZOLE, 500 mg, Intravenous, Q8H  pantoprazole, 40 mg, Oral, Early Morning  saccharomyces boulardii, 250 mg, Oral, BID    Continuous IV Infusions:  sodium chloride, 125 mL/hr, Intravenous, Continuous    PRN Meds:  acetaminophen, 650 mg, Oral, Q6H PRN  HYDROmorphone, 1 mg, Intravenous, Q3H PRN  ondansetron, 4 mg, Intravenous, Q8H PRN    Discharge Plan: d    Network Utilization Review Department  ATTENTION: Please call with any questions or concerns to [phone] and carefully listen to the prompts so that you are directed to the right person  All voicemails are confidential   Princess Dye all requests for admission clinical reviews, approved or denied determinations and any other requests to dedicated fax number below belonging to the campus where the patient is receiving treatment   List of dedicated fax numbers for the Facilities:  [address] DENIALS (Administrative/Medical Necessity) [phone]   [address] (Maternity/NICU/Pediatrics) [phone]   [address] Ashtyn Perez [phone]   [facility] [phone]   [facility] [phone]   [address] [place] [address] Subha  41  [phone]   [address] [place] [address] 310 [phone]   [address] [phone]

## 2023-02-18 NOTE — PLAN OF CARE
Problem: PAIN - ADULT  Goal: Verbalizes/displays adequate comfort level or baseline comfort level  Description: Interventions:  - Encourage patient to monitor pain and request assistance  - Assess pain using appropriate pain scale  - Administer analgesics based on type and severity of pain and evaluate response  - Implement non-pharmacological measures as appropriate and evaluate response  - Consider cultural and social influences on pain and pain management  - Notify physician/advanced practitioner if interventions unsuccessful or patient reports new pain  Outcome: Progressing     Problem: INFECTION - ADULT  Goal: Absence or prevention of progression during hospitalization  Description: INTERVENTIONS:  - Assess and monitor for signs and symptoms of infection  - Monitor lab/diagnostic results  - Monitor all insertion sites, i e  indwelling lines, tubes, and drains  - Monitor endotracheal if appropriate and nasal secretions for changes in amount and color  - [place] appropriate cooling/warming therapies per order  - Administer medications as ordered  - Instruct and encourage patient and family to use good hand hygiene technique  - Identify and instruct in appropriate isolation precautions for identified infection/condition  Outcome: Progressing  Goal: Absence of fever/infection during neutropenic period  Description: INTERVENTIONS:  - Monitor WBC    Outcome: Progressing     Problem: DISCHARGE PLANNING  Goal: Discharge to home or other facility with appropriate resources  Description: INTERVENTIONS:  - Identify barriers to discharge w/patient and caregiver  - Arrange for needed discharge resources and transportation as appropriate  - Identify discharge learning needs (meds, wound care, etc )  - Arrange for interpretive services to assist at discharge as needed  - Refer to Case Management Department for coordinating discharge planning if the patient needs post-hospital services based on physician/advanced practitioner order or complex needs related to functional status, cognitive ability, or social support system  Outcome: Progressing     Problem: Knowledge Deficit  Goal: Patient/family/caregiver demonstrates understanding of disease process, treatment plan, medications, and discharge instructions  Description: Complete learning assessment and assess knowledge base    Interventions:  - Provide teaching at level of understanding  - Provide teaching via preferred learning methods  Outcome: Progressing     Problem: GASTROINTESTINAL - ADULT  Goal: Minimal or absence of nausea and/or vomiting  Description: INTERVENTIONS:  - Administer IV fluids if ordered to ensure adequate hydration  - Maintain NPO status until nausea and vomiting are resolved  - Nasogastric tube if ordered  - Administer ordered antiemetic medications as needed  - Provide nonpharmacologic comfort measures as appropriate  - Advance diet as tolerated, if ordered  - Consider nutrition services referral to assist patient with adequate nutrition and appropriate food choices  Outcome: Progressing  Goal: Maintains or returns to baseline bowel function  Description: INTERVENTIONS:  - Assess bowel function  - Encourage oral fluids to ensure adequate hydration  - Administer IV fluids if ordered to ensure adequate hydration  - Administer ordered medications as needed  - Encourage mobilization and activity  - Consider nutritional services referral to assist patient with adequate nutrition and appropriate food choices  Outcome: Progressing  Goal: Maintains adequate nutritional intake  Description: INTERVENTIONS:  - Monitor percentage of each meal consumed  - Identify factors contributing to decreased intake, treat as appropriate  - Assist with meals as needed  - Monitor I&O, weight, and lab values if indicated  - Obtain nutrition services referral as needed  Outcome: Progressing  Goal: Establish and maintain optimal ostomy function  Description: INTERVENTIONS:  - Assess bowel function  - Encourage oral fluids to ensure adequate hydration  - Administer IV fluids if ordered to ensure adequate hydration   - Administer ordered medications as needed  - Encourage mobilization and activity  - Nutrition services referral to assist patient with appropriate food choices  - Assess stoma site  - Consider wound care consult   Outcome: Progressing  Goal: Oral mucous membranes remain intact  Description: INTERVENTIONS  - Assess oral mucosa and hygiene practices  - Implement preventative oral hygiene regimen  - Implement oral medicated treatments as ordered  - Initiate Nutrition services referral as needed  Outcome: Progressing     Problem: SKIN/TISSUE INTEGRITY - ADULT  Goal: Skin Integrity remains intact(Skin Breakdown Prevention)  Description: Assess:  -Perform Ervin assessment every shift  -Clean and moisturize skin every q shift and prn  -Inspect skin when repositioning, toileting, and assisting with ADLS  -Assess under medical devices such as maxime every q shift and prn  -Assess extremities for adequate circulation and sensation     Bed Management:  -Have minimal linens on bed & keep smooth, unwrinkled  -Change linens as needed when moist or perspiring  -Avoid sitting or lying in one position for more than 2  hours while in bed  -Keep HOB at 30 degrees     Toileting:  -Offer bedside commode  -Assess for incontinence every 2 hrs  -Use incontinent care products after each incontinent episode such as moisture cream     Activity:  -Mobilize patient 3 times a day  -Encourage activity and walks on unit  -Encourage or provide ROM exercises   -Turn and reposition patient every 2 Hours  -Use appropriate equipment to lift or move patient in bed  -Instruct/ Assist with weight shifting every 2 when out of bed in chair  -Consider limitation of chair time 2 hour intervals    Skin Care:  -Avoid use of baby powder, tape, friction and shearing, hot water or constrictive clothing  -Relieve pressure over bony prominences using Allevyn-Do not massage red bony areas    Next Steps:  -Teach patient strategies to minimize risks such as Calling for assistance   -Consider consults to  interdisciplinary teams such as PT/OT  Outcome: Progressing  Goal: Incision(s), wounds(s) or drain site(s) healing without S/S of infection  Description: INTERVENTIONS  - Assess and document dressing, incision, wound bed, drain sites and surrounding tissue  - Provide patient and family education  - Perform skin care/dressing changes every daily and prn  Outcome: Progressing     Problem: HEMATOLOGIC - ADULT  Goal: Maintains hematologic stability  Description: INTERVENTIONS  - Assess for signs and symptoms of bleeding or hemorrhage  - Monitor labs  - Administer supportive blood products/factors as ordered and appropriate  Outcome: Progressing

## 2023-02-18 NOTE — ASSESSMENT & PLAN NOTE
A/C, GI following, scope tomorrow    Patient has history of ulcerative colitis, follows [facility] and outpatient GI  · Plan as above

## 2023-02-18 NOTE — ASSESSMENT & PLAN NOTE
Recurrent, improving per patient report,    Patient with abdominal pain around ostomy site beginning last night, reports high output of ostomy, likely secondary to IBD flare    · Received dilaudid 4mg in ED  · CT a/p Enteric contrast reaches the level the right lower quadrant ileostomy without evidence of obstruction  2   Redemonstrated mild wall thickening and fluid within the J-pouch similar to prior examination  CRP 13 2  · IVFs  · OP Pain management referral -due to abdominal pain and MSK/neuro nature  · GI recs; low fiber high residue, lactose ADAT, ABX, follow stool panel, continue Questran and probiotic, C diff negative trial Lomotil prior to meals, I and O stool, supportive, pain, and time emetics, -If no improvement in symptoms, consider ileoscopy on Monday

## 2023-02-18 NOTE — ASSESSMENT & PLAN NOTE
POA as evidenced by fever 102 5 at home, tachycardia, WBC 12 86 - was admitted from 2/10-2/14 with sepsis and abdominal pain, initially on zosyn, sent home on augmentin x7 days  · CT a/p: Enteric contrast reaches the level the right lower quadrant ileostomy without evidence of obstruction  Redemonstrated mild wall thickening and fluid within the J-pouch similar to prior examination    · CXR negative  · UA negative  · Lactic acid 0 9  · Blood cultures- NG 72h  · Completed levaquin, flagyl for empiric coverage  · Stool cultures negative for bacteria, negative for C  difficile

## 2023-02-18 NOTE — PLAN OF CARE
Problem: PAIN - ADULT  Goal: Verbalizes/displays adequate comfort level or baseline comfort level  Description: Interventions:  - Encourage patient to monitor pain and request assistance  - Assess pain using appropriate pain scale  - Administer analgesics based on type and severity of pain and evaluate response  - Implement non-pharmacological measures as appropriate and evaluate response  - Consider cultural and social influences on pain and pain management  - Notify physician/advanced practitioner if interventions unsuccessful or patient reports new pain  Outcome: Progressing     Problem: INFECTION - ADULT  Goal: Absence or prevention of progression during hospitalization  Description: INTERVENTIONS:  - Assess and monitor for signs and symptoms of infection  - Monitor lab/diagnostic results  - Monitor all insertion sites, i e  indwelling lines, tubes, and drains  - Monitor endotracheal if appropriate and nasal secretions for changes in amount and color  - [place] appropriate cooling/warming therapies per order  - Administer medications as ordered  - Instruct and encourage patient and family to use good hand hygiene technique  - Identify and instruct in appropriate isolation precautions for identified infection/condition  Outcome: Progressing  Goal: Absence of fever/infection during neutropenic period  Description: INTERVENTIONS:  - Monitor WBC    Outcome: Progressing     Problem: DISCHARGE PLANNING  Goal: Discharge to home or other facility with appropriate resources  Description: INTERVENTIONS:  - Identify barriers to discharge w/patient and caregiver  - Arrange for needed discharge resources and transportation as appropriate  - Identify discharge learning needs (meds, wound care, etc )  - Arrange for interpretive services to assist at discharge as needed  - Refer to Case Management Department for coordinating discharge planning if the patient needs post-hospital services based on physician/advanced practitioner order or complex needs related to functional status, cognitive ability, or social support system  Outcome: Progressing     Problem: Knowledge Deficit  Goal: Patient/family/caregiver demonstrates understanding of disease process, treatment plan, medications, and discharge instructions  Description: Complete learning assessment and assess knowledge base    Interventions:  - Provide teaching at level of understanding  - Provide teaching via preferred learning methods  Outcome: Progressing     Problem: GASTROINTESTINAL - ADULT  Goal: Minimal or absence of nausea and/or vomiting  Description: INTERVENTIONS:  - Administer IV fluids if ordered to ensure adequate hydration  - Maintain NPO status until nausea and vomiting are resolved  - Nasogastric tube if ordered  - Administer ordered antiemetic medications as needed  - Provide nonpharmacologic comfort measures as appropriate  - Advance diet as tolerated, if ordered  - Consider nutrition services referral to assist patient with adequate nutrition and appropriate food choices  Outcome: Progressing  Goal: Maintains or returns to baseline bowel function  Description: INTERVENTIONS:  - Assess bowel function  - Encourage oral fluids to ensure adequate hydration  - Administer IV fluids if ordered to ensure adequate hydration  - Administer ordered medications as needed  - Encourage mobilization and activity  - Consider nutritional services referral to assist patient with adequate nutrition and appropriate food choices  Outcome: Progressing  Goal: Maintains adequate nutritional intake  Description: INTERVENTIONS:  - Monitor percentage of each meal consumed  - Identify factors contributing to decreased intake, treat as appropriate  - Assist with meals as needed  - Monitor I&O, weight, and lab values if indicated  - Obtain nutrition services referral as needed  Outcome: Progressing  Goal: Establish and maintain optimal ostomy function  Description: INTERVENTIONS:  - Assess bowel function  - Encourage oral fluids to ensure adequate hydration  - Administer IV fluids if ordered to ensure adequate hydration   - Administer ordered medications as needed  - Encourage mobilization and activity  - Nutrition services referral to assist patient with appropriate food choices  - Assess stoma site  - Consider wound care consult   Outcome: Progressing  Goal: Oral mucous membranes remain intact  Description: INTERVENTIONS  - Assess oral mucosa and hygiene practices  - Implement preventative oral hygiene regimen  - Implement oral medicated treatments as ordered  - Initiate Nutrition services referral as needed  Outcome: Progressing     Problem: SKIN/TISSUE INTEGRITY - ADULT  Goal: Skin Integrity remains intact(Skin Breakdown Prevention)  Description: Assess:  -Perform Ervin assessment every shift  -Clean and moisturize skin every shift  -Inspect skin when repositioning, toileting, and assisting with ADLS  -Assess under medical devices such as Masimo every shift   -Assess extremities for adequate circulation and sensation     Bed Management:  -Have minimal linens on bed & keep smooth, unwrinkled  -Change linens as needed when moist or perspiring  -Avoid sitting or lying in one position for more than 2 hours while in bed  -Keep HOB at 30 degrees or as tolerated      Problem: HEMATOLOGIC - ADULT  Goal: Maintains hematologic stability  Description: INTERVENTIONS  - Assess for signs and symptoms of bleeding or hemorrhage  - Monitor labs  - Administer supportive blood products/factors as ordered and appropriate  Outcome: Progressing

## 2023-02-19 PROBLEM — R11.0 NAUSEA: Status: ACTIVE | Noted: 2023-02-19

## 2023-02-19 LAB
CAMPYLOBACTER DNA SPEC NAA+PROBE: NORMAL
SALMONELLA DNA SPEC QL NAA+PROBE: NORMAL
SHIGA TOXIN STX GENE SPEC NAA+PROBE: NORMAL
SHIGELLA DNA SPEC QL NAA+PROBE: NORMAL

## 2023-02-19 RX ORDER — DIPHENOXYLATE HYDROCHLORIDE AND ATROPINE SULFATE 2.5; .025 MG/1; MG/1
1 TABLET ORAL 4 TIMES DAILY PRN
Status: DISCONTINUED | OUTPATIENT
Start: 2023-02-19 | End: 2023-02-20 | Stop reason: HOSPADM

## 2023-02-19 RX ADMIN — HYDROMORPHONE HYDROCHLORIDE 1 MG: 1 INJECTION, SOLUTION INTRAMUSCULAR; INTRAVENOUS; SUBCUTANEOUS at 06:10

## 2023-02-19 RX ADMIN — HYDROMORPHONE HYDROCHLORIDE 1 MG: 1 INJECTION, SOLUTION INTRAMUSCULAR; INTRAVENOUS; SUBCUTANEOUS at 13:09

## 2023-02-19 RX ADMIN — DULOXETINE HYDROCHLORIDE 60 MG: 60 CAPSULE, DELAYED RELEASE ORAL at 08:14

## 2023-02-19 RX ADMIN — METRONIDAZOLE 500 MG: 500 INJECTION, SOLUTION INTRAVENOUS at 08:14

## 2023-02-19 RX ADMIN — METRONIDAZOLE 500 MG: 500 INJECTION, SOLUTION INTRAVENOUS at 16:26

## 2023-02-19 RX ADMIN — Medication 250 MG: at 17:01

## 2023-02-19 RX ADMIN — SODIUM CHLORIDE 125 ML/HR: 0.9 INJECTION, SOLUTION INTRAVENOUS at 19:50

## 2023-02-19 RX ADMIN — LEVOFLOXACIN 750 MG: 750 INJECTION, SOLUTION INTRAVENOUS at 21:12

## 2023-02-19 RX ADMIN — ONDANSETRON 4 MG: 2 INJECTION INTRAMUSCULAR; INTRAVENOUS at 13:09

## 2023-02-19 RX ADMIN — Medication 250 MG: at 08:14

## 2023-02-19 RX ADMIN — ENOXAPARIN SODIUM 40 MG: 40 INJECTION SUBCUTANEOUS at 08:14

## 2023-02-19 RX ADMIN — HYDROMORPHONE HYDROCHLORIDE 1 MG: 1 INJECTION, SOLUTION INTRAMUSCULAR; INTRAVENOUS; SUBCUTANEOUS at 03:04

## 2023-02-19 RX ADMIN — PANTOPRAZOLE SODIUM 40 MG: 40 TABLET, DELAYED RELEASE ORAL at 05:33

## 2023-02-19 RX ADMIN — HYDROMORPHONE HYDROCHLORIDE 1 MG: 1 INJECTION, SOLUTION INTRAMUSCULAR; INTRAVENOUS; SUBCUTANEOUS at 09:29

## 2023-02-19 RX ADMIN — HYDROMORPHONE HYDROCHLORIDE 1 MG: 1 INJECTION, SOLUTION INTRAMUSCULAR; INTRAVENOUS; SUBCUTANEOUS at 16:26

## 2023-02-19 RX ADMIN — HYDROMORPHONE HYDROCHLORIDE 1 MG: 1 INJECTION, SOLUTION INTRAMUSCULAR; INTRAVENOUS; SUBCUTANEOUS at 19:50

## 2023-02-19 NOTE — UTILIZATION REVIEW
Observation 2/16/23 @ 2056 converted to inpatient admission 2/19/23 @ 8424 for continued care & tx for SIRS      Level of Care Med Surg   Estimated length of stay More than 2 Midnights   Certification I certify that inpatient services are medically necessary for this patient for a duration of greater than two midnights  See H&P and MD Progress Notes for additional information about the patient's course of treatment  Date: 2/19/23                          Current Patient Class: inpatient  Current Level of Care: med surg  HPI:27 y o  male initially admitted on 2/19/23    Assessment/Plan:   Diet changed to lactose restriction lo fiber & residue  Continues to require multiple dose IV Dilaudid for pain control, IV Zofran for nausea  IVF maintained  Stool cultures negative for bacteria, negative for C  Difficile  C/o nausea, reported output slightly increased but more solid, also repeat reported increased intake       Vital Signs:   02/19/23 14:54:35 97 7 °F (36 5 °C) -- -- 126/85 99 -- -- --   02/19/23 07:12:54 97 6 °F (36 4 °C) 82 18 121/79 93 100 % -- --     Pertinent Labs/Diagnostic Results:   Results from last 7 days   Lab Units 02/17/23  0512 02/16/23  1629   WBC Thousand/uL 9 42 12 86*   HEMOGLOBIN g/dL 10 5* 12 6   HEMATOCRIT % 35 2* 42 0   PLATELETS Thousands/uL 433* 560*   NEUTROS ABS Thousands/µL 6 07 9 60*     Results from last 7 days   Lab Units 02/17/23  0512 02/16/23  1629   SODIUM mmol/L 132* 138   POTASSIUM mmol/L 3 5 3 6   CHLORIDE mmol/L 99 100   CO2 mmol/L 25 24   ANION GAP mmol/L 8 14*   BUN mg/dL 10 10   CREATININE mg/dL 0 89 1 25   EGFR ml/min/1 73sq m 115 77   CALCIUM mg/dL 9 2 11 2*     Results from last 7 days   Lab Units 02/16/23  1629   AST U/L 21   ALT U/L 32   ALK PHOS U/L 97   TOTAL PROTEIN g/dL 9 3*   ALBUMIN g/dL 5 3*   TOTAL BILIRUBIN mg/dL 0 65         Results from last 7 days   Lab Units 02/17/23  0512 02/16/23  1629   GLUCOSE RANDOM mg/dL 79 80     Results from last 7 days   Lab Units 02/16/23  1629   LACTIC ACID mmol/L 0 9     Results from last 7 days   Lab Units 02/16/23  1629   CRP mg/L 13 2*     Results from last 7 days   Lab Units 02/16/23  2136   CLARITY UA  Clear   COLOR UA  Light Yellow   SPEC GRAV UA  <=1 005   PH UA  6 5   GLUCOSE UA mg/dl Negative   KETONES UA mg/dl 15 (1+)*   BLOOD UA  Negative   PROTEIN UA mg/dl Trace*   NITRITE UA  Negative   BILIRUBIN UA  Small*   UROBILINOGEN UA E U /dl 0 2   LEUKOCYTES UA  Negative   WBC UA /hpf 1-2   RBC UA /hpf 0-1   BACTERIA UA /hpf None Seen   EPITHELIAL CELLS WET PREP /hpf Occasional     Results from last 7 days   Lab Units 02/17/23  2322   C DIFF TOXIN B BY PCR  Negative     Results from last 7 days   Lab Units 02/17/23  2322   SALMONELLA SP PCR  None Detected   SHIGELLA SP/ENTEROINVASIVE E  COLI (EIEC)  None Detected   CAMPYLOBACTER SP (JEJUNI AND COLI)  None Detected   SHIGA TOXIN 1/SHIGA TOXIN 2  None Detected     Results from last 7 days   Lab Units 02/16/23  1715 02/16/23  1628   BLOOD CULTURE  No Growth at 72 hrs  No Growth at 72 hrs  Medications:   Scheduled Medications:  DULoxetine, 60 mg, Oral, Daily  enoxaparin, 40 mg, Subcutaneous, Daily  levofloxacin, 750 mg, Intravenous, Q24H  metroNIDAZOLE, 500 mg, Intravenous, Q8H  pantoprazole, 40 mg, Oral, Early Morning  saccharomyces boulardii, 250 mg, Oral, BID    Continuous IV Infusions:  sodium chloride, 125 mL/hr, Intravenous, Continuous    PRN Meds:  acetaminophen, 650 mg, Oral, Q6H PRN  HYDROmorphone, 1 mg, Intravenous, Q3H PRN, 2/16 x1, 2/17 x6, 2/18 x7, 2/19 x4  ondansetron, 4 mg, Intravenous, Q8H PRN, 2/18 x1, 2/19 x1    Discharge Plan: d    Network Utilization Review Department  ATTENTION: Please call with any questions or concerns to [phone] and carefully listen to the prompts so that you are directed to the right person   All voicemails are confidential   Harlene Pair all requests for admission clinical reviews, approved or denied determinations and any other requests to dedicated fax number below belonging to the campus where the patient is receiving treatment   List of dedicated fax numbers for the Facilities:  [address] [place] [address] DENIALS (Administrative/Medical Necessity) [phone]   [address] [place] [address] (Maternity/NICU/Pediatrics) [phone]   [address] Ashtyn Ana [phone]   Lyndsey Leavitt 77 [phone]   [address] [place] [address] [facility]ruz[facility] [phone]   15[address] [facility] [address] [facility] [phone]

## 2023-02-19 NOTE — PROGRESS NOTES
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 stricture with ileostomy creation in 10/2022 c/b proximal limb evisceration, purulent ascites and sepsis requiring revision and washout who presents with high output from his ileostomy  He was admitted recently from 2/10- due to sepsis of unclear source although he did have a prior admission for PNA that was treated for antibiotics  He was reports increased ostomy output associated with recurrent fevers, nausea and abdominal pain  He had a CT A/P with contrast which showed no evidence of obstruction but did show mild wall thickening and fluid levels within the J-pouch  His infectious work-up has been negative with stool studies negative for CDI   He is scheduled for surgery in April for revision of his J pouch at [facility]     - Advanced to low fiber / low residue deit   - Can start lomotil to slow down ostomy output  - Continue IV flagyl   - DVT ppx   - Anti-emetics and pain management per primary team  - If no improvement in symptoms, consider inpatient ileoscopy     Janny Maldonado MD  ______________________________________________________________________    Subjective:  - Pt feeling well this AM, no acute events overnight     INPATIENT MEDICATIONS:   Medications Prior to Admission   Medication   • acetaminophen (TYLENOL) 325 mg tablet   • ALPRAZolam (XANAX) 0 5 mg tablet   • [] amoxicillin-clavulanate (AUGMENTIN) 875-125 mg per tablet   • DULoxetine (CYMBALTA) 60 mg delayed release capsule   • ondansetron (ZOFRAN-ODT) 4 mg disintegrating tablet       Objective   /98   Pulse 81   Temp 97 8 °F (36 6 °C)   Resp 18   Ht 5' 9" (1 753 m)   Wt 77 9 kg (171 lb 12 8 oz)   SpO2 97%   BMI 25 37 kg/m²     PHYSICAL EXAM:    General: No acute distress  HEENT: No scleral icterus, normocephalic  Abdomen: Soft, non-tender, non-distended, normoactive bowel sounds, no masses, no hepatomegaly, ostomy output with semi-formed stool   Extremities: No lower extremity edema  Skin: No jaundice  Neuro: Awake, alert, oriented x 3    Lab Results:   Lab Results   Component Value Date    WBC 9 42 02/17/2023    HGB 10 5 (L) 02/17/2023    HCT 35 2 (L) 02/17/2023    MCV 62 (L) 02/17/2023     (H) 02/17/2023     Lab Results   Component Value Date     10/31/2015    SODIUM 132 (L) 02/17/2023    K 3 5 02/17/2023    CL 99 02/17/2023    CO2 25 02/17/2023    ANIONGAP 10 0 10/31/2015    AGAP 8 02/17/2023    BUN 10 02/17/2023    CREATININE 0 89 02/17/2023    GLUC 79 02/17/2023    GLUF 79 02/17/2023    CALCIUM 9 2 02/17/2023    AST 21 02/16/2023    ALT 32 02/16/2023    ALKPHOS 97 02/16/2023    PROT 8 1 10/30/2015    TP 9 3 (H) 02/16/2023    BILITOT 0 6 10/30/2015    TBILI 0 65 02/16/2023    EGFR 115 02/17/2023     Lab Results   Component Value Date    INR 0 98 02/10/2023    INR 1 22 (H) 01/26/2023    INR 1 01 01/25/2023    PROTIME 13 1 02/10/2023    PROTIME 15 5 (H) 01/26/2023    PROTIME 13 4 01/25/2023     Lab Results   Component Value Date    IRON 55 (L) 01/25/2023    TIBC 324 01/25/2023    FERRITIN 213 01/25/2023       Imaging Studies: I have personally reviewed pertinent imaging studies  XR chest portable    Result Date: 2/17/2023  Impression: No acute cardiopulmonary disease  Workstation performed: [id number]     CT abdomen pelvis with contrast    Result Date: 2/16/2023  Impression: 1  Enteric contrast reaches the level the right lower quadrant ileostomy without evidence of obstruction  2   Redemonstrated mild wall thickening and fluid within the J-pouch similar to prior examination   Workstation performed: [id number]

## 2023-02-19 NOTE — PROGRESS NOTES
[facility] 43  Progress Note - Gabriella Boss 1995, 32 y o  male MRN: [id number]  Unit/Bed#: [address] [facility] Encounter: [id number]  Primary Care Provider: Librado Phillips DO   Date and time admitted to hospital: 2/16/2023  3:34 PM    Intractable abdominal pain  Assessment & Plan  Recurrent, improving per patient report,    Patient with abdominal pain around ostomy site beginning last night, reports high output of ostomy, likely secondary to IBD flare    · Received dilaudid 4mg in ED  · CT a/p Enteric contrast reaches the level the right lower quadrant ileostomy without evidence of obstruction  2   Redemonstrated mild wall thickening and fluid within the J-pouch similar to prior examination  CRP 13 2  · IVFs  · OP Pain management referral -due to abdominal pain and MSK/neuro nature  · GI recs; low fiber high residue, lactose ADAT, ABX, follow stool panel, continue Questran and probiotic, C diff negative trial Lomotil prior to meals, I and O stool, supportive, pain, and time emetics, -If no improvement in symptoms, consider ileoscopy on Monday     * SIRS (systemic inflammatory response syndrome) (HCC)  Assessment & Plan      POA as evidenced by fever 102 5 at home, tachycardia, WBC 12 86 - was admitted from 2/10-2/14 with sepsis and abdominal pain, initially on zosyn, sent home on augmentin x7 days  · CT a/p: Enteric contrast reaches the level the right lower quadrant ileostomy without evidence of obstruction  Redemonstrated mild wall thickening and fluid within the J-pouch similar to prior examination    · CXR negative  · UA negative  · Lactic acid 0 9  · Blood cultures- NG 48  · Start levaquin, flagyl for empiric coverage  · Stool cultures negative for bacteria, negative for C  difficile        History of ulcerative colitis  Assessment & Plan  A/C, GI following, scope tomorrow    Patient has history of ulcerative colitis, follows [facility] and outpatient GI  · Plan as above    Nausea  Assessment & Plan  Continue Zofran as needed    Ankylosing spondylitis ([facility] [address] )  Assessment & Plan  History noted, follows with PCP        VTE Pharmacologic Prophylaxis:   Moderate Risk (Score 3-4) - Pharmacological DVT Prophylaxis Ordered: enoxaparin (Lovenox)  Patient Centered Rounds: I performed bedside rounds with nursing staff today  Discussions with Specialists or Other Care Team Provider: gi  Education and Discussions with Family / Patient: Patient declined call to   Total Time Spent on Date of Encounter in care of patient: 45 minutes This time was spent on one or more of the following: performing physical exam; counseling and coordination of care; obtaining or reviewing history; documenting in the medical record; reviewing/ordering tests, medications or procedures; communicating with other healthcare professionals and discussing with patient's family/caregivers  Current Length of Stay: 0 day(s)  Current Patient Status: Observation   Certification Statement: The patient will continue to require additional inpatient hospital stay due to clinical course  Discharge Plan: Anticipate discharge in 24-48 hrs to home  Code Status: Level 1 - Full Code    Subjective:   Patient seen and examined at bedside, patient stated that if he felt better yesterday he would not want to stay an additional day and not get scope, today patient reported that he had nausea, denied any pain and reported output slightly increased but more solid, also repeat reported increased intake  Patient denied any headache, chest pain or abdominal pain at this point  Objective:     Vitals:   Temp (24hrs), Av 8 °F (36 6 °C), Min:97 6 °F (36 4 °C), Max:97 9 °F (36 6 °C)    Temp:  [97 6 °F (36 4 °C)-97 9 °F (36 6 °C)] 97 6 °F (36 4 °C)  HR:  [79-82] 82  Resp:  [18] 18  BP: (121-154)/(79-98) 121/79  SpO2:  [96 %-100 %] 100 %  Body mass index is 25 37 kg/m²  Input and Output Summary (last 24 hours):      Intake/Output Summary (Last 24 hours) at 2/19/2023 1429  Last data filed at 2/19/2023 1425  Gross per 24 hour   Intake --   Output 4250 ml   Net -4250 ml       Physical Exam:   Physical Exam  Vitals and nursing note reviewed  Constitutional:       General: He is not in acute distress  Appearance: He is well-developed  HENT:      Head: Normocephalic and atraumatic  Eyes:      Conjunctiva/sclera: Conjunctivae normal    Cardiovascular:      Rate and Rhythm: Normal rate and regular rhythm  Heart sounds: No murmur heard  Pulmonary:      Effort: Pulmonary effort is normal  No respiratory distress  Breath sounds: Normal breath sounds  Abdominal:      General: There is no distension  Palpations: Abdomen is soft  Tenderness: There is no abdominal tenderness  There is no guarding or rebound  Comments: Right-sided ostomy in place   Musculoskeletal:         General: No swelling  Cervical back: Neck supple  Skin:     General: Skin is warm and dry  Capillary Refill: Capillary refill takes less than 2 seconds  Neurological:      Mental Status: He is alert     Psychiatric:         Mood and Affect: Mood normal           Additional Data:     Labs:  Results from last 7 days   Lab Units 02/17/23  0512   WBC Thousand/uL 9 42   HEMOGLOBIN g/dL 10 5*   HEMATOCRIT % 35 2*   PLATELETS Thousands/uL 433*   NEUTROS PCT % 64   LYMPHS PCT % 20   MONOS PCT % 10   EOS PCT % 3     Results from last 7 days   Lab Units 02/17/23  0512 02/16/23  1629   SODIUM mmol/L 132* 138   POTASSIUM mmol/L 3 5 3 6   CHLORIDE mmol/L 99 100   CO2 mmol/L 25 24   BUN mg/dL 10 10   CREATININE mg/dL 0 89 1 25   ANION GAP mmol/L 8 14*   CALCIUM mg/dL 9 2 11 2*   ALBUMIN g/dL  --  5 3*   TOTAL BILIRUBIN mg/dL  --  0 65   ALK PHOS U/L  --  97   ALT U/L  --  32   AST U/L  --  21   GLUCOSE RANDOM mg/dL 79 80         Results from last 7 days   Lab Units 02/13/23  0913   POC GLUCOSE mg/dl 85         Results from last 7 days   Lab Units 02/16/23  1629   LACTIC ACID mmol/L 0 9       Lines/Drains:  Invasive Devices     Peripheral Intravenous Line  Duration           Peripheral IV 02/17/23 Right;Ventral (anterior) Forearm 2 days          Drain  Duration           Ileostomy Continent (Abdominal pouch) RLQ 90 days                      Imaging: Reviewed radiology reports from this admission including: chest xray and abdominal/pelvic CT    Recent Cultures (last 7 days):   Results from last 7 days   Lab Units 02/17/23  2322 02/16/23  1715 02/16/23  1628   BLOOD CULTURE   --  No Growth at 48 hrs  No Growth at 48 hrs  C DIFF TOXIN B BY PCR  Negative  --   --        Last 24 Hours Medication List:   Current Facility-Administered Medications   Medication Dose Route Frequency Provider Last Rate   • acetaminophen  650 mg Oral Q6H PRN Garrett Ghosh PA-C     • diphenoxylate-atropine  1 tablet Oral 4x Daily PRN Elizabeth Avendaño MD     • DULoxetine  60 mg Oral Daily Emmanuelle SELENA Dodge     • enoxaparin  40 mg Subcutaneous Daily Emmanuelle CARI DodgeC     • HYDROmorphone  1 mg Intravenous Q3H PRN Garrett Ghosh PA-C     • levofloxacin  750 mg Intravenous Q24H Emmanuelle Vecdamariso, PA-C 750 mg (02/18/23 2140)   • metroNIDAZOLE  500 mg Intravenous Q8H Emmanuelle Vecellio, PA-C 500 mg (02/19/23 2142)   • ondansetron  4 mg Intravenous Q8H PRN [place] Nicholson     • pantoprazole  40 mg Oral Early Morning [place] Nicholson     • saccharomyces boulardii  250 mg Oral BID [place] Nicholson     • sodium chloride  125 mL/hr Intravenous Continuous Garrett Ghosh PA-C 125 mL/hr (02/18/23 2342)        Today, Patient Was Seen By: Janneth Rai MD    **Please Note: This note may have been constructed using a voice recognition system  **

## 2023-02-19 NOTE — PLAN OF CARE
Problem: PAIN - ADULT  Goal: Verbalizes/displays adequate comfort level or baseline comfort level  Description: Interventions:  - Encourage patient to monitor pain and request assistance  - Assess pain using appropriate pain scale  - Administer analgesics based on type and severity of pain and evaluate response  - Implement non-pharmacological measures as appropriate and evaluate response  - Consider cultural and social influences on pain and pain management  - Notify physician/advanced practitioner if interventions unsuccessful or patient reports new pain  Outcome: Progressing     Problem: INFECTION - ADULT  Goal: Absence or prevention of progression during hospitalization  Description: INTERVENTIONS:  - Assess and monitor for signs and symptoms of infection  - Monitor lab/diagnostic results  - Monitor all insertion sites, i e  indwelling lines, tubes, and drains  - Monitor endotracheal if appropriate and nasal secretions for changes in amount and color  - [place] appropriate cooling/warming therapies per order  - Administer medications as ordered  - Instruct and encourage patient and family to use good hand hygiene technique  - Identify and instruct in appropriate isolation precautions for identified infection/condition  Outcome: Progressing  Goal: Absence of fever/infection during neutropenic period  Description: INTERVENTIONS:  - Monitor WBC    Outcome: Progressing     Problem: DISCHARGE PLANNING  Goal: Discharge to home or other facility with appropriate resources  Description: INTERVENTIONS:  - Identify barriers to discharge w/patient and caregiver  - Arrange for needed discharge resources and transportation as appropriate  - Identify discharge learning needs (meds, wound care, etc )  - Arrange for interpretive services to assist at discharge as needed  - Refer to Case Management Department for coordinating discharge planning if the patient needs post-hospital services based on physician/advanced practitioner order or complex needs related to functional status, cognitive ability, or social support system  Outcome: Progressing     Problem: GASTROINTESTINAL - ADULT  Goal: Minimal or absence of nausea and/or vomiting  Description: INTERVENTIONS:  - Administer IV fluids if ordered to ensure adequate hydration  - Maintain NPO status until nausea and vomiting are resolved  - Nasogastric tube if ordered  - Administer ordered antiemetic medications as needed  - Provide nonpharmacologic comfort measures as appropriate  - Advance diet as tolerated, if ordered  - Consider nutrition services referral to assist patient with adequate nutrition and appropriate food choices  Outcome: Progressing  Goal: Maintains or returns to baseline bowel function  Description: INTERVENTIONS:  - Assess bowel function  - Encourage oral fluids to ensure adequate hydration  - Administer IV fluids if ordered to ensure adequate hydration  - Administer ordered medications as needed  - Encourage mobilization and activity  - Consider nutritional services referral to assist patient with adequate nutrition and appropriate food choices  Outcome: Progressing  Goal: Maintains adequate nutritional intake  Description: INTERVENTIONS:  - Monitor percentage of each meal consumed  - Identify factors contributing to decreased intake, treat as appropriate  - Assist with meals as needed  - Monitor I&O, weight, and lab values if indicated  - Obtain nutrition services referral as needed  Outcome: Progressing  Goal: Establish and maintain optimal ostomy function  Description: INTERVENTIONS:  - Assess bowel function  - Encourage oral fluids to ensure adequate hydration  - Administer IV fluids if ordered to ensure adequate hydration   - Administer ordered medications as needed  - Encourage mobilization and activity  - Nutrition services referral to assist patient with appropriate food choices  - Assess stoma site  - Consider wound care consult   Outcome: Progressing  Goal: Oral mucous membranes remain intact  Description: INTERVENTIONS  - Assess oral mucosa and hygiene practices  - Implement preventative oral hygiene regimen  - Implement oral medicated treatments as ordered  - Initiate Nutrition services referral as needed  Outcome: Progressing

## 2023-02-20 ENCOUNTER — TRANSITIONAL CARE MANAGEMENT (OUTPATIENT)
Dept: FAMILY MEDICINE CLINIC | Facility: CLINIC | Age: 28
End: 2023-02-20

## 2023-02-20 ENCOUNTER — TELEPHONE (OUTPATIENT)
Dept: FAMILY MEDICINE CLINIC | Facility: CLINIC | Age: 28
End: 2023-02-20

## 2023-02-20 VITALS
BODY MASS INDEX: 25.45 KG/M2 | HEIGHT: 69 IN | HEART RATE: 65 BPM | RESPIRATION RATE: 17 BRPM | OXYGEN SATURATION: 99 % | WEIGHT: 171.8 LBS | SYSTOLIC BLOOD PRESSURE: 126 MMHG | DIASTOLIC BLOOD PRESSURE: 78 MMHG | TEMPERATURE: 97.7 F

## 2023-02-20 PROBLEM — R10.9 INTRACTABLE ABDOMINAL PAIN: Status: RESOLVED | Noted: 2022-07-12 | Resolved: 2023-02-20

## 2023-02-20 PROBLEM — R11.0 NAUSEA: Status: RESOLVED | Noted: 2023-02-19 | Resolved: 2023-02-20

## 2023-02-20 LAB
ATRIAL RATE: 102 BPM
P AXIS: 60 DEGREES
PR INTERVAL: 170 MS
QRS AXIS: -10 DEGREES
QRSD INTERVAL: 84 MS
QT INTERVAL: 326 MS
QTC INTERVAL: 424 MS
T WAVE AXIS: 17 DEGREES
VENTRICULAR RATE: 102 BPM

## 2023-02-20 RX ORDER — HYDROMORPHONE HCL/PF 1 MG/ML
0.5 SYRINGE (ML) INJECTION ONCE
Status: COMPLETED | OUTPATIENT
Start: 2023-02-20 | End: 2023-02-20

## 2023-02-20 RX ORDER — DIPHENOXYLATE HYDROCHLORIDE AND ATROPINE SULFATE 2.5; .025 MG/1; MG/1
1 TABLET ORAL 2 TIMES DAILY PRN
Qty: 12 TABLET | Refills: 0 | Status: ON HOLD | OUTPATIENT
Start: 2023-02-20 | End: 2023-03-02

## 2023-02-20 RX ADMIN — HYDROMORPHONE HYDROCHLORIDE 1 MG: 1 INJECTION, SOLUTION INTRAMUSCULAR; INTRAVENOUS; SUBCUTANEOUS at 07:55

## 2023-02-20 RX ADMIN — HYDROMORPHONE HYDROCHLORIDE 0.5 MG: 1 INJECTION, SOLUTION INTRAMUSCULAR; INTRAVENOUS; SUBCUTANEOUS at 11:47

## 2023-02-20 RX ADMIN — PANTOPRAZOLE SODIUM 40 MG: 40 TABLET, DELAYED RELEASE ORAL at 05:09

## 2023-02-20 RX ADMIN — HYDROMORPHONE HYDROCHLORIDE 1 MG: 1 INJECTION, SOLUTION INTRAMUSCULAR; INTRAVENOUS; SUBCUTANEOUS at 04:10

## 2023-02-20 RX ADMIN — HYDROMORPHONE HYDROCHLORIDE 1 MG: 1 INJECTION, SOLUTION INTRAMUSCULAR; INTRAVENOUS; SUBCUTANEOUS at 00:55

## 2023-02-20 RX ADMIN — SODIUM CHLORIDE 125 ML/HR: 0.9 INJECTION, SOLUTION INTRAVENOUS at 04:10

## 2023-02-20 RX ADMIN — METRONIDAZOLE 500 MG: 500 INJECTION, SOLUTION INTRAVENOUS at 08:05

## 2023-02-20 RX ADMIN — METRONIDAZOLE 500 MG: 500 INJECTION, SOLUTION INTRAVENOUS at 00:23

## 2023-02-20 NOTE — PLAN OF CARE
Problem: PAIN - ADULT  Goal: Verbalizes/displays adequate comfort level or baseline comfort level  Description: Interventions:  - Encourage patient to monitor pain and request assistance  - Assess pain using appropriate pain scale  - Administer analgesics based on type and severity of pain and evaluate response  - Implement non-pharmacological measures as appropriate and evaluate response  - Consider cultural and social influences on pain and pain management  - Notify physician/advanced practitioner if interventions unsuccessful or patient reports new pain  Outcome: Progressing     Problem: INFECTION - ADULT  Goal: Absence or prevention of progression during hospitalization  Description: INTERVENTIONS:  - Assess and monitor for signs and symptoms of infection  - Monitor lab/diagnostic results  - Monitor all insertion sites, i e  indwelling lines, tubes, and drains  - Monitor endotracheal if appropriate and nasal secretions for changes in amount and color  - [place] appropriate cooling/warming therapies per order  - Administer medications as ordered  - Instruct and encourage patient and family to use good hand hygiene technique  - Identify and instruct in appropriate isolation precautions for identified infection/condition  Outcome: Progressing  Goal: Absence of fever/infection during neutropenic period  Description: INTERVENTIONS:  - Monitor WBC    Outcome: Progressing     Problem: DISCHARGE PLANNING  Goal: Discharge to home or other facility with appropriate resources  Description: INTERVENTIONS:  - Identify barriers to discharge w/patient and caregiver  - Arrange for needed discharge resources and transportation as appropriate  - Identify discharge learning needs (meds, wound care, etc )  - Arrange for interpretive services to assist at discharge as needed  - Refer to Case Management Department for coordinating discharge planning if the patient needs post-hospital services based on physician/advanced practitioner order or complex needs related to functional status, cognitive ability, or social support system  Outcome: Progressing     Problem: Knowledge Deficit  Goal: Patient/family/caregiver demonstrates understanding of disease process, treatment plan, medications, and discharge instructions  Description: Complete learning assessment and assess knowledge base    Interventions:  - Provide teaching at level of understanding  - Provide teaching via preferred learning methods  Outcome: Progressing     Problem: GASTROINTESTINAL - ADULT  Goal: Minimal or absence of nausea and/or vomiting  Description: INTERVENTIONS:  - Administer IV fluids if ordered to ensure adequate hydration  - Maintain NPO status until nausea and vomiting are resolved  - Nasogastric tube if ordered  - Administer ordered antiemetic medications as needed  - Provide nonpharmacologic comfort measures as appropriate  - Advance diet as tolerated, if ordered  - Consider nutrition services referral to assist patient with adequate nutrition and appropriate food choices  Outcome: Progressing  Goal: Maintains or returns to baseline bowel function  Description: INTERVENTIONS:  - Assess bowel function  - Encourage oral fluids to ensure adequate hydration  - Administer IV fluids if ordered to ensure adequate hydration  - Administer ordered medications as needed  - Encourage mobilization and activity  - Consider nutritional services referral to assist patient with adequate nutrition and appropriate food choices  Outcome: Progressing  Goal: Maintains adequate nutritional intake  Description: INTERVENTIONS:  - Monitor percentage of each meal consumed  - Identify factors contributing to decreased intake, treat as appropriate  - Assist with meals as needed  - Monitor I&O, weight, and lab values if indicated  - Obtain nutrition services referral as needed  Outcome: Progressing  Goal: Establish and maintain optimal ostomy function  Description: INTERVENTIONS:  - Assess bowel function  - Encourage oral fluids to ensure adequate hydration  - Administer IV fluids if ordered to ensure adequate hydration   - Administer ordered medications as needed  - Encourage mobilization and activity  - Nutrition services referral to assist patient with appropriate food choices  - Assess stoma site  - Consider wound care consult   Outcome: Progressing  Goal: Oral mucous membranes remain intact  Description: INTERVENTIONS  - Assess oral mucosa and hygiene practices  - Implement preventative oral hygiene regimen  - Implement oral medicated treatments as ordered  - Initiate Nutrition services referral as needed  Outcome: Progressing

## 2023-02-20 NOTE — DISCHARGE SUMMARY
Julian 43  Discharge- Larry Howard 1995, 32 y o  male MRN: [id number]  Unit/Bed#: [address] [place] [address] Encounter: [id number]  Primary Care Provider: Anita Jalloh DO   Date and time admitted to hospital: 2/16/2023  3:34 PM    Intractable abdominal pain-resolved as of 2/20/2023  Assessment & Plan  Recurrent, improving per patient report,    Patient with abdominal pain around ostomy site beginning last night, reports high output of ostomy, likely secondary to IBD flare    · Received dilaudid 4mg in ED  · CT a/p Enteric contrast reaches the level the right lower quadrant ileostomy without evidence of obstruction  2   Redemonstrated mild wall thickening and fluid within the J-pouch similar to prior examination  CRP 13 2  · IVFs  · OP Pain management referral -due to abdominal pain and MSK/neuro nature  · GI recs; low fiber high residue, lactose ADAT, ABX, follow stool panel, continue Questran and probiotic, C diff negative trial Lomotil prior to meals, I and O stool, supportive, pain, and time emetics, -If no improvement in symptoms, consider ileoscopy on Monday     * SIRS (systemic inflammatory response syndrome) (HCC)  Assessment & Plan      POA as evidenced by fever 102 5 at home, tachycardia, WBC 12 86 - was admitted from 2/10-2/14 with sepsis and abdominal pain, initially on zosyn, sent home on augmentin x7 days  · CT a/p: Enteric contrast reaches the level the right lower quadrant ileostomy without evidence of obstruction  Redemonstrated mild wall thickening and fluid within the J-pouch similar to prior examination    · CXR negative  · UA negative  · Lactic acid 0 9  · Blood cultures- NG 72h  · Completed levaquin, flagyl for empiric coverage  · Stool cultures negative for bacteria, negative for C  difficile        History of ulcerative colitis  Assessment & Plan  A/C, GI following, scope tomorrow    Patient has history of ulcerative colitis, follows [facility] and outpatient GI  · Plan as above    Ankylosing spondylitis [facility])  Assessment & Plan  History noted, follows with PCP    Nausea-resolved as of 2/20/2023  Assessment & Plan  Continue Zofran as needed    Medical Problems     Resolved Problems  Date Reviewed: 2/20/2023          Resolved    Intractable abdominal pain 2/20/2023     Resolved by  Hernesto Samuel MD    Nausea 2/20/2023     Resolved by  Hernesto Samuel MD        Discharging Physician / Practitioner: Hernesto Samuel MD  PCP: Charla Ang DO  Admission Date:   Admission Orders (From admission, onward)     Ordered        02/19/23 1453  Inpatient Admission  Once            02/16/23 2056  Place in Observation  Once                      Discharge Date: 02/20/23    Consultations During Hospital Stay:  · Gi     Procedures Performed:   · N/a    Significant Findings / Test Results:   · N/a    Incidental Findings:   · N/a   ·   Test Results Pending at Discharge (will require follow up):   N/a     Outpatient Tests Requested:  N/a    Complications:  N/a    Reason for Admission:     Hospital Course:   Tc Collins is a 32 y o  male patient who originally presented to the hospital on 2/16/2023 due to high output in his ostomy bag and abdominal pain  PTA patient reported home fever and informed by PCP to go to ED for evaluation  Patient met SIRS criteria on admission placed on Levaquin and Flagyl empirically with improvement in symptoms per patient report  Patient reported decreased ostomy output while inpatient and forming stools  Patient reported reduction in abdominal pain was able to advance diet to regular GI recommended diet  Patient not a candidate for scoping per GI due to improvement in symptoms and will follow-up outpatient GI specialist at [facility] for J-tube revision in April  All other chronic conditions were controlled with home medications and or inpatient equivalent    Please see above list of diagnoses and related plan for additional information       Condition at Discharge: fair    Discharge Day Visit / Exam:   Subjective:  Patient seen and examined at bedside, patient reported improvement in pain, and increased stool forming  Nursing reported patient did not want to be scoped at this time  Vitals: Blood Pressure: 126/78 (02/20/23 0739)  Pulse: 65 (02/20/23 0739)  Temperature: 97 7 °F (36 5 °C) (02/20/23 0739)  Temp Source: Oral (02/17/23 1047)  Respirations: 17 (02/19/23 1947)  Height: 5' 9" (175 3 cm) (02/16/23 2144)  Weight - Scale: 77 9 kg (171 lb 12 8 oz) (02/16/23 2144)  SpO2: 99 % (02/20/23 0739)  Exam:   Physical Exam  Vitals and nursing note reviewed  Constitutional:       General: He is not in acute distress  Appearance: He is well-developed  HENT:      Head: Normocephalic and atraumatic  Eyes:      Conjunctiva/sclera: Conjunctivae normal    Cardiovascular:      Rate and Rhythm: Normal rate and regular rhythm  Heart sounds: No murmur heard  Pulmonary:      Effort: Pulmonary effort is normal  No respiratory distress  Breath sounds: Normal breath sounds  No wheezing or rales  Abdominal:      General: There is no distension  Palpations: Abdomen is soft  Tenderness: There is no abdominal tenderness  There is no guarding or rebound  Hernia: No hernia is present  Musculoskeletal:         General: No swelling  Cervical back: Neck supple  Right lower leg: No edema  Left lower leg: No edema  Skin:     General: Skin is warm and dry  Capillary Refill: Capillary refill takes less than 2 seconds  Neurological:      Mental Status: He is alert and oriented to person, place, and time  Psychiatric:         Mood and Affect: Mood normal           Discussion with Family: Patient declined call to   Discharge instructions/Information to patient and family:   See after visit summary for information provided to patient and family        Provisions for Follow-Up Care:  See after visit summary for information related to follow-up care and any pertinent home health orders  Disposition:   Home    Planned Readmission: no     Discharge Statement:  I spent 45 minutes discharging the patient  This time was spent on the day of discharge  I had direct contact with the patient on the day of discharge  Greater than 50% of the total time was spent examining patient, answering all patient questions, arranging and discussing plan of care with patient as well as directly providing post-discharge instructions  Additional time then spent on discharge activities  Discharge Medications:  See after visit summary for reconciled discharge medications provided to patient and/or family        **Please Note: This note may have been constructed using a voice recognition system**

## 2023-02-20 NOTE — PLAN OF CARE
Problem: GASTROINTESTINAL - ADULT  Goal: Maintains adequate nutritional intake  Description: INTERVENTIONS:  - Monitor percentage of each meal consumed  - Identify factors contributing to decreased intake, treat as appropriate  - Assist with meals as needed  - Monitor I&O, weight, and lab values if indicated  - Obtain nutrition services referral as needed  Outcome: Progressing     Problem: INFECTION - ADULT  Goal: Absence or prevention of progression during hospitalization  Description: INTERVENTIONS:  - Assess and monitor for signs and symptoms of infection  - Monitor lab/diagnostic results  - Monitor all insertion sites, i e  indwelling lines, tubes, and drains  - Monitor endotracheal if appropriate and nasal secretions for changes in amount and color  - [place] appropriate cooling/warming therapies per order  - Administer medications as ordered  - Instruct and encourage patient and family to use good hand hygiene technique  - Identify and instruct in appropriate isolation precautions for identified infection/condition  Outcome: Progressing     Problem: PAIN - ADULT  Goal: Verbalizes/displays adequate comfort level or baseline comfort level  Description: Interventions:  - Encourage patient to monitor pain and request assistance  - Assess pain using appropriate pain scale  - Administer analgesics based on type and severity of pain and evaluate response  - Implement non-pharmacological measures as appropriate and evaluate response  - Consider cultural and social influences on pain and pain management  - Notify physician/advanced practitioner if interventions unsuccessful or patient reports new pain  Outcome: Progressing

## 2023-02-21 DIAGNOSIS — Z71.89 COMPLEX CARE COORDINATION: Primary | ICD-10-CM

## 2023-02-21 LAB
BACTERIA BLD CULT: NORMAL
BACTERIA BLD CULT: NORMAL

## 2023-02-21 NOTE — TELEPHONE ENCOUNTER
----- Message from Janneth Rai MD sent at 2/20/2023 10:08 AM EST -----  Thank you for allowing us to participate in the care of your patient, Thuy Snyder, who was hospitalized from 2/16/2023 through 2/20/2023 with the admitting diagnosis of high output in his ostomy bag and abdominal pain  PTA patient reported home fever and informed by PCP to go to ED for evaluation  Patient met SIRS criteria on admission placed on Levaquin and Flagyl empirically with improvement in symptoms per patient report  Patient reported decreased ostomy output while inpatient and forming stools  Patient reported reduction in abdominal pain was able to advance diet to regular GI recommended diet  Patient not a candidate for scoping per GI due to improvement in symptoms and will follow-up outpatient GI specialist at [facility] for J-tube revision in April  All other chronic conditions were controlled with home medications and or inpatient equivalent      If you have any additional questions or would like to discuss further, please feel free to contact me      Janneth Rai MD  [facility] Internal Medicine, Hospitalist  [phone]

## 2023-02-21 NOTE — PROGRESS NOTES
Referred to [facility]  Patient will be reviewed for a care plan  Patient is referred to complex care management as a [employer]  In basket sent to OP RN OLESYA Ang  regarding the same

## 2023-02-22 NOTE — UTILIZATION REVIEW
NOTIFICATION OF ADMISSION DISCHARGE   This is a Notification of Discharge from [facility]  Please be advised that this patient has been discharge from our facility  Below you will find the admission and discharge date and time including the patient’s disposition  UTILIZATION REVIEW CONTACT:  Og Hodge  Utilization   Network Utilization Review Department  Phone: [phone] x carefully listen to the prompts  All voicemails are confidential   Email: [email or web address]     ADMISSION INFORMATION  PRESENTATION DATE: 2/16/2023  3:34 PM  OBERVATION ADMISSION DATE:   INPATIENT ADMISSION DATE: 2/19/23  2:53 PM   DISCHARGE DATE: 2/20/2023 12:10 PM   DISPOSITION:Home/Self Care    IMPORTANT INFORMATION:  Send all requests for admission clinical reviews, approved or denied determinations and any other requests to dedicated fax number below belonging to the campus where the patient is receiving treatment   List of dedicated fax numbers:  [address] DENIALS (Administrative/Medical Necessity) [phone]   [address] (Maternity/NICU/Pediatrics) [phone]   [facility] [phone]   [place] [address] [phone]   [facility]  [phone] [address] [facility] [phone] [address] [facility] [phone]   [address] [place] [address] [phone]   [facility]  [phone] [address] [facility] [phone]   [address] [facility] [address] [facility] [phone]

## 2023-02-26 ENCOUNTER — HOSPITAL ENCOUNTER (EMERGENCY)
Facility: HOSPITAL | Age: 28
Discharge: HOME | End: 2023-02-27
Attending: EMERGENCY MEDICINE

## 2023-02-26 ENCOUNTER — APPOINTMENT (EMERGENCY)
Dept: RADIOLOGY | Facility: HOSPITAL | Age: 28
End: 2023-02-26

## 2023-02-26 DIAGNOSIS — R10.9 ABDOMINAL PAIN: Primary | ICD-10-CM

## 2023-02-26 LAB
ALBUMIN SERPL BCP-MCNC: 4.7 G/DL (ref 3.5–5)
ALP SERPL-CCNC: 90 U/L (ref 34–104)
ALT SERPL W P-5'-P-CCNC: 32 U/L (ref 7–52)
ANION GAP SERPL CALCULATED.3IONS-SCNC: 12 MMOL/L (ref 4–13)
AST SERPL W P-5'-P-CCNC: 30 U/L (ref 13–39)
BASOPHILS # BLD AUTO: 0.19 THOUSANDS/ÂΜL (ref 0–0.1)
BASOPHILS NFR BLD AUTO: 3 % (ref 0–1)
BILIRUB SERPL-MCNC: 0.45 MG/DL (ref 0.2–1)
BUN SERPL-MCNC: 11 MG/DL (ref 5–25)
CALCIUM SERPL-MCNC: 9.1 MG/DL (ref 8.4–10.2)
CHLORIDE SERPL-SCNC: 104 MMOL/L (ref 96–108)
CO2 SERPL-SCNC: 21 MMOL/L (ref 21–32)
CREAT SERPL-MCNC: 0.93 MG/DL (ref 0.6–1.3)
EOSINOPHIL # BLD AUTO: 0.37 THOUSAND/ÂΜL (ref 0–0.61)
EOSINOPHIL NFR BLD AUTO: 5 % (ref 0–6)
ERYTHROCYTE [DISTWIDTH] IN BLOOD BY AUTOMATED COUNT: 17.5 % (ref 11.6–15.1)
GFR SERPL CREATININE-BSD FRML MDRD: 110 ML/MIN/1.73SQ M
GLUCOSE SERPL-MCNC: 97 MG/DL (ref 65–140)
HCT VFR BLD AUTO: 37.8 % (ref 36.5–49.3)
HGB BLD-MCNC: 11.4 G/DL (ref 12–17)
IMM GRANULOCYTES # BLD AUTO: 0.02 THOUSAND/UL (ref 0–0.2)
IMM GRANULOCYTES NFR BLD AUTO: 0 % (ref 0–2)
LIPASE SERPL-CCNC: 44 U/L (ref 11–82)
LYMPHOCYTES # BLD AUTO: 2.4 THOUSANDS/ÂΜL (ref 0.6–4.47)
LYMPHOCYTES NFR BLD AUTO: 31 % (ref 14–44)
MCH RBC QN AUTO: 18.9 PG (ref 26.8–34.3)
MCHC RBC AUTO-ENTMCNC: 30.2 G/DL (ref 31.4–37.4)
MCV RBC AUTO: 63 FL (ref 82–98)
MONOCYTES # BLD AUTO: 0.75 THOUSAND/ÂΜL (ref 0.17–1.22)
MONOCYTES NFR BLD AUTO: 10 % (ref 4–12)
NEUTROPHILS # BLD AUTO: 3.97 THOUSANDS/ÂΜL (ref 1.85–7.62)
NEUTS SEG NFR BLD AUTO: 51 % (ref 43–75)
NRBC BLD AUTO-RTO: 0 /100 WBCS
PLATELET # BLD AUTO: 524 THOUSANDS/UL (ref 149–390)
PMV BLD AUTO: 8.4 FL (ref 8.9–12.7)
POTASSIUM SERPL-SCNC: 3.7 MMOL/L (ref 3.5–5.3)
PROT SERPL-MCNC: 7.2 G/DL (ref 6.4–8.4)
RBC # BLD AUTO: 6.02 MILLION/UL (ref 3.88–5.62)
SODIUM SERPL-SCNC: 137 MMOL/L (ref 135–147)
WBC # BLD AUTO: 7.7 THOUSAND/UL (ref 4.31–10.16)

## 2023-02-26 RX ORDER — HYDROMORPHONE HCL/PF 1 MG/ML
1 SYRINGE (ML) INJECTION ONCE
Status: COMPLETED | OUTPATIENT
Start: 2023-02-26 | End: 2023-02-26

## 2023-02-26 RX ORDER — ONDANSETRON 2 MG/ML
4 INJECTION INTRAMUSCULAR; INTRAVENOUS ONCE
Status: COMPLETED | OUTPATIENT
Start: 2023-02-26 | End: 2023-02-26

## 2023-02-26 RX ADMIN — SODIUM CHLORIDE 1000 ML: 0.9 INJECTION, SOLUTION INTRAVENOUS at 18:19

## 2023-02-26 RX ADMIN — HYDROMORPHONE HYDROCHLORIDE 1 MG: 1 INJECTION, SOLUTION INTRAMUSCULAR; INTRAVENOUS; SUBCUTANEOUS at 18:26

## 2023-02-26 RX ADMIN — HYDROMORPHONE HYDROCHLORIDE 1 MG: 1 INJECTION, SOLUTION INTRAMUSCULAR; INTRAVENOUS; SUBCUTANEOUS at 21:01

## 2023-02-26 RX ADMIN — ONDANSETRON 4 MG: 2 INJECTION INTRAMUSCULAR; INTRAVENOUS at 18:18

## 2023-02-26 RX ADMIN — ONDANSETRON 4 MG: 2 INJECTION INTRAMUSCULAR; INTRAVENOUS at 21:01

## 2023-02-26 RX ADMIN — HYDROMORPHONE HYDROCHLORIDE 1 MG: 1 INJECTION, SOLUTION INTRAMUSCULAR; INTRAVENOUS; SUBCUTANEOUS at 18:19

## 2023-02-26 RX ADMIN — IOHEXOL 100 ML: 350 INJECTION, SOLUTION INTRAVENOUS at 21:25

## 2023-02-27 ENCOUNTER — APPOINTMENT (EMERGENCY)
Dept: RADIOLOGY | Facility: HOSPITAL | Age: 28
End: 2023-02-27

## 2023-02-27 ENCOUNTER — PATIENT OUTREACH (OUTPATIENT)
Dept: FAMILY MEDICINE CLINIC | Facility: CLINIC | Age: 28
End: 2023-02-27

## 2023-02-27 ENCOUNTER — HOSPITAL ENCOUNTER (OUTPATIENT)
Facility: HOSPITAL | Age: 28
Setting detail: OBSERVATION
Discharge: HOME | End: 2023-02-27
Attending: EMERGENCY MEDICINE | Admitting: INTERNAL MEDICINE

## 2023-02-27 VITALS
SYSTOLIC BLOOD PRESSURE: 115 MMHG | RESPIRATION RATE: 18 BRPM | OXYGEN SATURATION: 98 % | HEART RATE: 92 BPM | DIASTOLIC BLOOD PRESSURE: 74 MMHG | BODY MASS INDEX: 25.33 KG/M2 | WEIGHT: 171 LBS | TEMPERATURE: 98.6 F | HEIGHT: 69 IN

## 2023-02-27 VITALS
HEART RATE: 99 BPM | DIASTOLIC BLOOD PRESSURE: 82 MMHG | TEMPERATURE: 98.1 F | RESPIRATION RATE: 18 BRPM | SYSTOLIC BLOOD PRESSURE: 125 MMHG | OXYGEN SATURATION: 97 %

## 2023-02-27 DIAGNOSIS — K91.850 ILEAL POUCHITIS (HCC): Primary | ICD-10-CM

## 2023-02-27 DIAGNOSIS — R10.9 INTRACTABLE ABDOMINAL PAIN: ICD-10-CM

## 2023-02-27 DIAGNOSIS — G89.29 CHRONIC PAIN: ICD-10-CM

## 2023-02-27 LAB
ALBUMIN SERPL BCP-MCNC: 4 G/DL (ref 3.5–5)
ALP SERPL-CCNC: 75 U/L (ref 34–104)
ALT SERPL W P-5'-P-CCNC: 38 U/L (ref 7–52)
ANION GAP SERPL CALCULATED.3IONS-SCNC: 6 MMOL/L (ref 4–13)
AST SERPL W P-5'-P-CCNC: 36 U/L (ref 13–39)
BASOPHILS # BLD AUTO: 0.13 THOUSANDS/ÂΜL (ref 0–0.1)
BASOPHILS NFR BLD AUTO: 2 % (ref 0–1)
BILIRUB SERPL-MCNC: 0.54 MG/DL (ref 0.2–1)
BUN SERPL-MCNC: 11 MG/DL (ref 5–25)
CALCIUM SERPL-MCNC: 8.7 MG/DL (ref 8.4–10.2)
CHLORIDE SERPL-SCNC: 106 MMOL/L (ref 96–108)
CO2 SERPL-SCNC: 26 MMOL/L (ref 21–32)
CREAT SERPL-MCNC: 0.92 MG/DL (ref 0.6–1.3)
EOSINOPHIL # BLD AUTO: 0.4 THOUSAND/ÂΜL (ref 0–0.61)
EOSINOPHIL NFR BLD AUTO: 5 % (ref 0–6)
ERYTHROCYTE [DISTWIDTH] IN BLOOD BY AUTOMATED COUNT: 17.1 % (ref 11.6–15.1)
GFR SERPL CREATININE-BSD FRML MDRD: 111 ML/MIN/1.73SQ M
GLUCOSE SERPL-MCNC: 79 MG/DL (ref 65–140)
HCT VFR BLD AUTO: 34.1 % (ref 36.5–49.3)
HGB BLD-MCNC: 10.2 G/DL (ref 12–17)
IMM GRANULOCYTES # BLD AUTO: 0.02 THOUSAND/UL (ref 0–0.2)
IMM GRANULOCYTES NFR BLD AUTO: 0 % (ref 0–2)
LIPASE SERPL-CCNC: 40 U/L (ref 11–82)
LYMPHOCYTES # BLD AUTO: 1.43 THOUSANDS/ÂΜL (ref 0.6–4.47)
LYMPHOCYTES NFR BLD AUTO: 17 % (ref 14–44)
MCH RBC QN AUTO: 18.8 PG (ref 26.8–34.3)
MCHC RBC AUTO-ENTMCNC: 29.9 G/DL (ref 31.4–37.4)
MCV RBC AUTO: 63 FL (ref 82–98)
MONOCYTES # BLD AUTO: 0.73 THOUSAND/ÂΜL (ref 0.17–1.22)
MONOCYTES NFR BLD AUTO: 9 % (ref 4–12)
NEUTROPHILS # BLD AUTO: 5.51 THOUSANDS/ÂΜL (ref 1.85–7.62)
NEUTS SEG NFR BLD AUTO: 67 % (ref 43–75)
NRBC BLD AUTO-RTO: 0 /100 WBCS
PLATELET # BLD AUTO: 412 THOUSANDS/UL (ref 149–390)
PMV BLD AUTO: 8.1 FL (ref 8.9–12.7)
POTASSIUM SERPL-SCNC: 4.1 MMOL/L (ref 3.5–5.3)
PROT SERPL-MCNC: 6.4 G/DL (ref 6.4–8.4)
RBC # BLD AUTO: 5.44 MILLION/UL (ref 3.88–5.62)
SODIUM SERPL-SCNC: 138 MMOL/L (ref 135–147)
WBC # BLD AUTO: 8.22 THOUSAND/UL (ref 4.31–10.16)

## 2023-02-27 RX ORDER — FAMOTIDINE 10 MG/ML
20 INJECTION, SOLUTION INTRAVENOUS ONCE
Status: COMPLETED | OUTPATIENT
Start: 2023-02-27 | End: 2023-02-27

## 2023-02-27 RX ORDER — KETOROLAC TROMETHAMINE 30 MG/ML
15 INJECTION, SOLUTION INTRAMUSCULAR; INTRAVENOUS ONCE
Status: COMPLETED | OUTPATIENT
Start: 2023-02-27 | End: 2023-02-27

## 2023-02-27 RX ORDER — METRONIDAZOLE 500 MG/1
500 TABLET ORAL ONCE
Status: COMPLETED | OUTPATIENT
Start: 2023-02-27 | End: 2023-02-27

## 2023-02-27 RX ORDER — HYDROCODONE BITARTRATE AND ACETAMINOPHEN 5; 325 MG/1; MG/1
1 TABLET ORAL EVERY 6 HOURS PRN
Qty: 6 TABLET | Refills: 0 | Status: SHIPPED | OUTPATIENT
Start: 2023-02-27 | End: 2023-03-09

## 2023-02-27 RX ORDER — METRONIDAZOLE 500 MG/1
500 TABLET ORAL EVERY 8 HOURS SCHEDULED
Qty: 42 TABLET | Refills: 0 | Status: SHIPPED | OUTPATIENT
Start: 2023-02-27 | End: 2023-03-06

## 2023-02-27 RX ORDER — HYDROCODONE BITARTRATE AND ACETAMINOPHEN 5; 325 MG/1; MG/1
1 TABLET ORAL ONCE
Status: COMPLETED | OUTPATIENT
Start: 2023-02-27 | End: 2023-02-27

## 2023-02-27 RX ORDER — PROMETHAZINE HYDROCHLORIDE 25 MG/ML
25 INJECTION, SOLUTION INTRAMUSCULAR; INTRAVENOUS ONCE
Status: COMPLETED | OUTPATIENT
Start: 2023-02-27 | End: 2023-02-27

## 2023-02-27 RX ORDER — ONDANSETRON 2 MG/ML
4 INJECTION INTRAMUSCULAR; INTRAVENOUS ONCE
Status: COMPLETED | OUTPATIENT
Start: 2023-02-27 | End: 2023-02-27

## 2023-02-27 RX ORDER — SODIUM CHLORIDE 9 MG/ML
150 INJECTION, SOLUTION INTRAVENOUS CONTINUOUS
Status: DISCONTINUED | OUTPATIENT
Start: 2023-02-27 | End: 2023-02-27 | Stop reason: HOSPADM

## 2023-02-27 RX ORDER — HYDROMORPHONE HCL/PF 1 MG/ML
1 SYRINGE (ML) INJECTION ONCE
Status: COMPLETED | OUTPATIENT
Start: 2023-02-27 | End: 2023-02-27

## 2023-02-27 RX ADMIN — HYDROCODONE BITARTRATE AND ACETAMINOPHEN 1 TABLET: 5; 325 TABLET ORAL at 18:36

## 2023-02-27 RX ADMIN — FAMOTIDINE 20 MG: 10 INJECTION, SOLUTION INTRAVENOUS at 15:47

## 2023-02-27 RX ADMIN — PROMETHAZINE HYDROCHLORIDE 25 MG: 25 INJECTION INTRAMUSCULAR; INTRAVENOUS at 00:14

## 2023-02-27 RX ADMIN — ONDANSETRON 4 MG: 2 INJECTION INTRAMUSCULAR; INTRAVENOUS at 15:46

## 2023-02-27 RX ADMIN — SODIUM CHLORIDE 150 ML/HR: 0.9 INJECTION, SOLUTION INTRAVENOUS at 16:21

## 2023-02-27 RX ADMIN — HYDROMORPHONE HYDROCHLORIDE 1 MG: 1 INJECTION, SOLUTION INTRAMUSCULAR; INTRAVENOUS; SUBCUTANEOUS at 16:21

## 2023-02-27 RX ADMIN — HYDROMORPHONE HYDROCHLORIDE 1 MG: 1 INJECTION, SOLUTION INTRAMUSCULAR; INTRAVENOUS; SUBCUTANEOUS at 00:15

## 2023-02-27 RX ADMIN — KETOROLAC TROMETHAMINE 15 MG: 30 INJECTION, SOLUTION INTRAMUSCULAR at 15:46

## 2023-02-27 RX ADMIN — METRONIDAZOLE 500 MG: 500 TABLET ORAL at 18:36

## 2023-02-27 NOTE — PROGRESS NOTES
[employer] referral for CCM received via IB  Chart was reviewed and this RNCM called patient and introduced self and explained the role of the RNCM and CCM  Patient states understanding  Patient states he is not feeling so great today  He says he was in the ER yesterday and was discharged  He states they offered to keep him over night but he declined because he knows there is nothing that can be done at this time  Patient has a h/o ulcerative colitis  He is s/p ileostomy with pouch failure and follows with a GI specialist at [facility]  He was most recently admitted to [address] [place] [address] at [facility] 2/16-2/20  He states he was seen by his specialist on 2/24/23  He states he cannot have any procedures or surgery at this time  He states he has surgery scheduled for April 19, 2023 at [facility]  He says the surgeries need to be spread out 6 months a part  Patient declines CCM or any follow up calls at this time  He states he does not want to go to the ER  He says he will wait it out  He denies any fever or chills  No CCM episode opened   [employer] episode closed and this RNCM removed self from care team

## 2023-02-27 NOTE — ED NOTES
Pt seen, assessed and d/c by provider  Pt appeared to be in no acute distress upon discharge  Pt able to ambulate well without assistance upon exiting        Claudine Stone RN  02/27/23 9056

## 2023-02-27 NOTE — ED NOTES
Bedside shift change report given to [address] [place] [address] (oncoming nurse) by Kay Sanders (offgoing nurse). Report included the following information SBAR, Kardex, Intake/Output, MAR and Cardiac Rhythm NSR. Pt drinking for CT scan  No acute distress noted        Cyndy Guzman RN  02/26/23 4872

## 2023-02-27 NOTE — Clinical Note
Case was discussed with Dr Randall Brizuela and the patient's admission status was agreed to be Admission Status: observation status to the service of Dr Randall Brizuela

## 2023-02-27 NOTE — ED PROVIDER NOTES
History  Chief Complaint   Patient presents with   • Vomiting     Patient arrives after trying to eat and started to vomit, vomited once since arrival,is very nauseated     Patient brought in by father for evaluation of abdominal pain  Patient tried to eat some food about 1 hour ago and had sudden onset of upper abdominal pain associated with nausea and vomiting  Complaining of severe abdominal pain and nausea  Patient has a history of Crohn's disease with multiple surgeries and small bowel obstructions  Currently has an ileostomy in place  Recent admission to the hospital for abdominal pain  Had been doing well since discharge until the pain started  History provided by:  Patient   used: No        Prior to Admission Medications   Prescriptions Last Dose Informant Patient Reported? Taking?    ALPRAZolam (XANAX) 0 5 mg tablet   No No   Si-2 po q8hrs prn anxiety   DULoxetine (CYMBALTA) 60 mg delayed release capsule   No No   Sig: Take 1 capsule (60 mg total) by mouth daily   acetaminophen (TYLENOL) 325 mg tablet   No No   Sig: Take 2 tablets (650 mg total) by mouth every 6 (six) hours as needed for mild pain, headaches or fever   diphenoxylate-atropine (LOMOTIL) 2 5-0 025 mg per tablet   No No   Sig: Take 1 tablet by mouth 2 (two) times a day as needed for diarrhea for up to 10 days Prior to meals   ondansetron (ZOFRAN-ODT) 4 mg disintegrating tablet   No No   Sig: Take 1 tablet (4 mg total) by mouth every 6 (six) hours as needed for nausea for up to 3 days      Facility-Administered Medications: None       Past Medical History:   Diagnosis Date   • Ankylosing spondylitis (HCC)    • Anxiety    • Bowel obstruction (HCC)    • Clostridium difficile colitis 2018   • Colitis    • 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[facility] ) 2018   • Pancreatitis    • Ulcerative colitis ([facility] )        Past Surgical History:   Procedure Laterality Date   • COLECTOMY TOTAL      with ileal pouch and anastemosis   • IR PICC PLACEMENT SINGLE LUMEN  3/1/2022   • TOTAL COLECTOMY         Family History   Problem Relation Age of Onset   • Lung disease Neg Hx      I have reviewed and agree with the history as documented  E-Cigarette/Vaping   • E-Cigarette Use Never User      E-Cigarette/Vaping Substances   • Nicotine No    • THC No    • CBD No    • Flavoring No    • Other No    • Unknown No      Social History     Tobacco Use   • Smoking status: Never   • Smokeless tobacco: Never   Vaping Use   • Vaping Use: Never used   Substance Use Topics   • Alcohol use: Not Currently     Comment: pt states 5 a month/socially   • Drug use: No       Review of Systems   Constitutional: Negative for chills and fever  HENT: Negative for ear pain and sore throat  Eyes: Negative for pain and visual disturbance  Respiratory: Negative for cough and shortness of breath  Cardiovascular: Negative for chest pain and palpitations  Gastrointestinal: Positive for abdominal pain, nausea and vomiting  Genitourinary: Negative for dysuria and hematuria  Musculoskeletal: Negative for arthralgias and back pain  Skin: Negative for color change and rash  Neurological: Negative for seizures and syncope  All other systems reviewed and are negative  Physical Exam  Physical Exam  Vitals and nursing note reviewed  Constitutional:       General: He is in acute distress  HENT:      Head: Atraumatic  Right Ear: External ear normal       Left Ear: External ear normal       Nose: Nose normal       Mouth/Throat:      Mouth: Mucous membranes are moist       Pharynx: Oropharynx is clear  Eyes:      General: No scleral icterus  Conjunctiva/sclera: Conjunctivae normal    Cardiovascular:      Rate and Rhythm: Normal rate and regular rhythm  Pulmonary:      Effort: Pulmonary effort is normal  No respiratory distress  Breath sounds: Normal breath sounds  Abdominal:      General: Abdomen is flat   Bowel sounds are normal  There is no distension  Palpations: Abdomen is soft  Tenderness: There is abdominal tenderness  There is no guarding or rebound  Comments: Epigastric abdominal pain ileostomy right side abdomen   Musculoskeletal:         General: No deformity  Normal range of motion  Skin:     Capillary Refill: Capillary refill takes less than 2 seconds  Findings: No rash  Neurological:      General: No focal deficit present  Mental Status: He is alert and oriented to person, place, and time           Vital Signs  ED Triage Vitals   Temperature Pulse Respirations Blood Pressure SpO2   02/26/23 1801 02/26/23 1805 02/26/23 1805 02/26/23 1805 02/26/23 1805   98 6 °F (37 °C) (!) 138 22 (!) 180/117 97 %      Temp Source Heart Rate Source Patient Position - Orthostatic VS BP Location FiO2 (%)   02/26/23 1801 02/26/23 1805 02/26/23 1805 02/26/23 1805 --   Tympanic Monitor Lying Left arm       Pain Score       02/26/23 1819       10 - Worst Possible Pain           Vitals:    02/26/23 1805   BP: (!) 180/117   Pulse: (!) 138   Patient Position - Orthostatic VS: Lying         Visual Acuity      ED Medications  Medications   ondansetron (ZOFRAN) injection 4 mg (4 mg Intravenous Given 2/26/23 1818)   sodium chloride 0 9 % bolus 1,000 mL (0 mL Intravenous Stopped 2/26/23 2102)   HYDROmorphone (DILAUDID) injection 1 mg (1 mg Intravenous Given 2/26/23 1819)   HYDROmorphone (DILAUDID) injection 1 mg (1 mg Intravenous Given 2/26/23 1826)   HYDROmorphone (DILAUDID) injection 1 mg (1 mg Intravenous Given 2/26/23 2101)   ondansetron (ZOFRAN) injection 4 mg (4 mg Intravenous Given 2/26/23 2101)   iohexol (OMNIPAQUE) 350 MG/ML injection (SINGLE-DOSE) 100 mL (100 mL Intravenous Given 2/26/23 2125)   iohexol (OMNIPAQUE) 240 MG/ML solution 50 mL (50 mL Oral Given 2/26/23 2125)       Diagnostic Studies  Results Reviewed     Procedure Component Value Units Date/Time    Comprehensive metabolic panel [[id number]] Collected: 02/26/23 1819    Lab Status: Final result Specimen: Blood from Arm, Right Updated: 02/26/23 1848     Sodium 137 mmol/L      Potassium 3 7 mmol/L      Chloride 104 mmol/L      CO2 21 mmol/L      ANION GAP 12 mmol/L      BUN 11 mg/dL      Creatinine 0 93 mg/dL      Glucose 97 mg/dL      Calcium 9 1 mg/dL      AST 30 U/L      ALT 32 U/L      Alkaline Phosphatase 90 U/L      Total Protein 7 2 g/dL      Albumin 4 7 g/dL      Total Bilirubin 0 45 mg/dL      eGFR 110 ml/min/1 73sq m     Narrative:      National Kidney Disease Foundation guidelines for Chronic Kidney Disease (CKD):   •  Stage 1 with normal or high GFR (GFR > 90 mL/min/1 73 square meters)  •  Stage 2 Mild CKD (GFR = 60-89 mL/min/1 73 square meters)  •  Stage 3A Moderate CKD (GFR = 45-59 mL/min/1 73 square meters)  •  Stage 3B Moderate CKD (GFR = 30-44 mL/min/1 73 square meters)  •  Stage 4 Severe CKD (GFR = 15-29 mL/min/1 73 square meters)  •  Stage 5 End Stage CKD (GFR <15 mL/min/1 73 square meters)  Note: GFR calculation is accurate only with a steady state creatinine    Lipase [[id number]]  (Normal) Collected: 02/26/23 1819    Lab Status: Final result Specimen: Blood from Arm, Right Updated: 02/26/23 1848     Lipase 44 u/L     CBC and differential [[id number]]  (Abnormal) Collected: 02/26/23 1819    Lab Status: Final result Specimen: Blood from Arm, Right Updated: 02/26/23 1827     WBC 7 70 Thousand/uL      RBC 6 02 Million/uL      Hemoglobin 11 4 g/dL      Hematocrit 37 8 %      MCV 63 fL      MCH 18 9 pg      MCHC 30 2 g/dL      RDW 17 5 %      MPV 8 4 fL      Platelets 097 Thousands/uL      nRBC 0 /100 WBCs      Neutrophils Relative 51 %      Immat GRANS % 0 %      Lymphocytes Relative 31 %      Monocytes Relative 10 %      Eosinophils Relative 5 %      Basophils Relative 3 %      Neutrophils Absolute 3 97 Thousands/µL      Immature Grans Absolute 0 02 Thousand/uL      Lymphocytes Absolute 2 40 Thousands/µL      Monocytes Absolute 0 75 Thousand/µL      Eosinophils Absolute 0 37 Thousand/µL      Basophils Absolute 0 19 Thousands/µL                  CT abdomen pelvis with contrast    (Results Pending)              Procedures  Procedures         ED Course                               SBIRT 22yo+    Flowsheet Row Most Recent Value   SBIRT (25 yo +)    In order to provide better care to our patients, we are screening all of our patients for alcohol and drug use  Would it be okay to ask you these screening questions? Unable to answer at this time Filed at: 02/26/2023 9005                    Medical Decision Making  Pulse ox 98% on room air indicating adequate oxygenation  Differential diagnosis included but not limited to perforated viscus, small bowel obstruction, Crohn's      Patient signed out to next provider Dr Rebecca Alcantar pending CT results    Abdominal pain: self-limited or minor problem  Amount and/or Complexity of Data Reviewed  Labs: ordered  Radiology: ordered  Risk  Prescription drug management  Disposition  Final diagnoses:   None     ED Disposition     None      Follow-up Information    None         Patient's Medications   Discharge Prescriptions    No medications on file       No discharge procedures on file      PDMP Review       Value Time User    PDMP Reviewed  Yes 2/20/2023  9:51 AM Amy Hughes MD          ED Provider  Electronically Signed by           Yamilet Huynh DO  02/27/23 4859

## 2023-02-27 NOTE — DISCHARGE INSTRUCTIONS
Return to the ER for further concerns or worsening symptoms  Follow up with your primary care physician in 1-2 days  Take medication as prescribed  Contact your General Surgeon at [facility] in the morning for the soonest follow up

## 2023-02-27 NOTE — ED NOTES
Pt returned from CT via wheel chair  Appeared to be resting comfortably       June Richards RN  02/26/23 1980

## 2023-02-27 NOTE — DISCHARGE INSTRUCTIONS
If you have any severe worsening of abdominal pain, worsening nausea and vomiting, fevers, chills, return to the emergency department

## 2023-03-02 ENCOUNTER — HOSPITAL ENCOUNTER (INPATIENT)
Facility: HOSPITAL | Age: 28
LOS: 3 days | Discharge: HOME | End: 2023-03-06
Attending: EMERGENCY MEDICINE | Admitting: STUDENT IN AN ORGANIZED HEALTH CARE EDUCATION/TRAINING PROGRAM

## 2023-03-02 DIAGNOSIS — K51.90 ULCERATIVE COLITIS (HCC): ICD-10-CM

## 2023-03-02 DIAGNOSIS — R10.9 ABDOMINAL PAIN: Primary | ICD-10-CM

## 2023-03-02 DIAGNOSIS — E86.0 DEHYDRATION: ICD-10-CM

## 2023-03-02 DIAGNOSIS — R55 SYNCOPE: ICD-10-CM

## 2023-03-02 DIAGNOSIS — K91.858: ICD-10-CM

## 2023-03-02 LAB
ALBUMIN SERPL BCP-MCNC: 4.7 G/DL (ref 3.5–5)
ALP SERPL-CCNC: 97 U/L (ref 34–104)
ALT SERPL W P-5'-P-CCNC: 42 U/L (ref 7–52)
ANION GAP SERPL CALCULATED.3IONS-SCNC: 11 MMOL/L (ref 4–13)
AST SERPL W P-5'-P-CCNC: 25 U/L (ref 13–39)
BACTERIA UR QL AUTO: ABNORMAL /HPF
BASOPHILS # BLD AUTO: 0.11 THOUSANDS/ÂΜL (ref 0–0.1)
BASOPHILS NFR BLD AUTO: 1 % (ref 0–1)
BILIRUB SERPL-MCNC: 0.92 MG/DL (ref 0.2–1)
BILIRUB UR QL STRIP: ABNORMAL
BUN SERPL-MCNC: 14 MG/DL (ref 5–25)
CALCIUM SERPL-MCNC: 9.7 MG/DL (ref 8.4–10.2)
CHLORIDE SERPL-SCNC: 101 MMOL/L (ref 96–108)
CLARITY UR: ABNORMAL
CO2 SERPL-SCNC: 24 MMOL/L (ref 21–32)
COLOR UR: YELLOW
CREAT SERPL-MCNC: 1.07 MG/DL (ref 0.6–1.3)
EOSINOPHIL # BLD AUTO: 0.36 THOUSAND/ÂΜL (ref 0–0.61)
EOSINOPHIL NFR BLD AUTO: 3 % (ref 0–6)
ERYTHROCYTE [DISTWIDTH] IN BLOOD BY AUTOMATED COUNT: 18.9 % (ref 11.6–15.1)
GFR SERPL CREATININE-BSD FRML MDRD: 93 ML/MIN/1.73SQ M
GLUCOSE SERPL-MCNC: 87 MG/DL (ref 65–140)
GLUCOSE UR STRIP-MCNC: NEGATIVE MG/DL
HCT VFR BLD AUTO: 44.3 % (ref 36.5–49.3)
HGB BLD-MCNC: 13 G/DL (ref 12–17)
HGB UR QL STRIP.AUTO: NEGATIVE
IMM GRANULOCYTES # BLD AUTO: 0.04 THOUSAND/UL (ref 0–0.2)
IMM GRANULOCYTES NFR BLD AUTO: 0 % (ref 0–2)
KETONES UR STRIP-MCNC: NEGATIVE MG/DL
LACTATE SERPL-SCNC: 1.7 MMOL/L (ref 0.5–2)
LEUKOCYTE ESTERASE UR QL STRIP: NEGATIVE
LIPASE SERPL-CCNC: 21 U/L (ref 11–82)
LYMPHOCYTES # BLD AUTO: 1.92 THOUSANDS/ÂΜL (ref 0.6–4.47)
LYMPHOCYTES NFR BLD AUTO: 16 % (ref 14–44)
MCH RBC QN AUTO: 18.6 PG (ref 26.8–34.3)
MCHC RBC AUTO-ENTMCNC: 29.3 G/DL (ref 31.4–37.4)
MCV RBC AUTO: 63 FL (ref 82–98)
MONOCYTES # BLD AUTO: 1.01 THOUSAND/ÂΜL (ref 0.17–1.22)
MONOCYTES NFR BLD AUTO: 9 % (ref 4–12)
MUCOUS THREADS UR QL AUTO: ABNORMAL
NEUTROPHILS # BLD AUTO: 8.29 THOUSANDS/ÂΜL (ref 1.85–7.62)
NEUTS SEG NFR BLD AUTO: 71 % (ref 43–75)
NITRITE UR QL STRIP: NEGATIVE
NON-SQ EPI CELLS URNS QL MICRO: ABNORMAL /HPF
NRBC BLD AUTO-RTO: 0 /100 WBCS
PH UR STRIP.AUTO: 6 [PH]
PLATELET # BLD AUTO: 575 THOUSANDS/UL (ref 149–390)
PMV BLD AUTO: 8.7 FL (ref 8.9–12.7)
POTASSIUM SERPL-SCNC: 3.7 MMOL/L (ref 3.5–5.3)
PROCALCITONIN SERPL-MCNC: 0.09 NG/ML
PROT SERPL-MCNC: 7.8 G/DL (ref 6.4–8.4)
PROT UR STRIP-MCNC: ABNORMAL MG/DL
RBC # BLD AUTO: 7 MILLION/UL (ref 3.88–5.62)
RBC #/AREA URNS AUTO: ABNORMAL /HPF
SODIUM SERPL-SCNC: 136 MMOL/L (ref 135–147)
SP GR UR STRIP.AUTO: >=1.03 (ref 1–1.03)
UROBILINOGEN UR QL STRIP.AUTO: 0.2 E.U./DL
WBC # BLD AUTO: 11.73 THOUSAND/UL (ref 4.31–10.16)
WBC #/AREA URNS AUTO: ABNORMAL /HPF

## 2023-03-02 RX ORDER — ACETAMINOPHEN 325 MG/1
650 TABLET ORAL EVERY 6 HOURS PRN
Status: DISCONTINUED | OUTPATIENT
Start: 2023-03-02 | End: 2023-03-06 | Stop reason: HOSPADM

## 2023-03-02 RX ORDER — CHOLESTYRAMINE LIGHT 4 G/5.7G
4 POWDER, FOR SUSPENSION ORAL 2 TIMES DAILY
Status: DISCONTINUED | OUTPATIENT
Start: 2023-03-02 | End: 2023-03-06 | Stop reason: HOSPADM

## 2023-03-02 RX ORDER — TRAMADOL HYDROCHLORIDE 50 MG/1
50 TABLET ORAL EVERY 6 HOURS PRN
Status: DISCONTINUED | OUTPATIENT
Start: 2023-03-02 | End: 2023-03-02

## 2023-03-02 RX ORDER — HYDROMORPHONE HCL/PF 1 MG/ML
0.5 SYRINGE (ML) INJECTION EVERY 4 HOURS PRN
Status: DISCONTINUED | OUTPATIENT
Start: 2023-03-02 | End: 2023-03-02

## 2023-03-02 RX ORDER — ONDANSETRON 2 MG/ML
4 INJECTION INTRAMUSCULAR; INTRAVENOUS ONCE
Status: COMPLETED | OUTPATIENT
Start: 2023-03-02 | End: 2023-03-02

## 2023-03-02 RX ORDER — ENOXAPARIN SODIUM 100 MG/ML
40 INJECTION SUBCUTANEOUS DAILY
Status: DISCONTINUED | OUTPATIENT
Start: 2023-03-03 | End: 2023-03-06 | Stop reason: HOSPADM

## 2023-03-02 RX ORDER — ONDANSETRON 2 MG/ML
4 INJECTION INTRAMUSCULAR; INTRAVENOUS EVERY 6 HOURS PRN
Status: DISCONTINUED | OUTPATIENT
Start: 2023-03-02 | End: 2023-03-06 | Stop reason: HOSPADM

## 2023-03-02 RX ORDER — KETOROLAC TROMETHAMINE 30 MG/ML
15 INJECTION, SOLUTION INTRAMUSCULAR; INTRAVENOUS ONCE
Status: DISCONTINUED | OUTPATIENT
Start: 2023-03-02 | End: 2023-03-02

## 2023-03-02 RX ORDER — DULOXETIN HYDROCHLORIDE 60 MG/1
60 CAPSULE, DELAYED RELEASE ORAL DAILY
Status: DISCONTINUED | OUTPATIENT
Start: 2023-03-03 | End: 2023-03-06 | Stop reason: HOSPADM

## 2023-03-02 RX ORDER — SODIUM CHLORIDE 9 MG/ML
125 INJECTION, SOLUTION INTRAVENOUS CONTINUOUS
Status: DISCONTINUED | OUTPATIENT
Start: 2023-03-02 | End: 2023-03-06 | Stop reason: HOSPADM

## 2023-03-02 RX ORDER — ALPRAZOLAM 0.5 MG/1
0.5 TABLET ORAL 3 TIMES DAILY PRN
Status: DISCONTINUED | OUTPATIENT
Start: 2023-03-02 | End: 2023-03-06 | Stop reason: HOSPADM

## 2023-03-02 RX ORDER — HYDROMORPHONE HCL/PF 1 MG/ML
1 SYRINGE (ML) INJECTION ONCE
Status: COMPLETED | OUTPATIENT
Start: 2023-03-02 | End: 2023-03-02

## 2023-03-02 RX ORDER — LEVOFLOXACIN 5 MG/ML
750 INJECTION, SOLUTION INTRAVENOUS EVERY 24 HOURS
Status: COMPLETED | OUTPATIENT
Start: 2023-03-02 | End: 2023-03-02

## 2023-03-02 RX ORDER — HYDROMORPHONE HCL/PF 1 MG/ML
0.5 SYRINGE (ML) INJECTION
Status: DISCONTINUED | OUTPATIENT
Start: 2023-03-02 | End: 2023-03-06 | Stop reason: HOSPADM

## 2023-03-02 RX ORDER — HYDROMORPHONE HCL 110MG/55ML
2 PATIENT CONTROLLED ANALGESIA SYRINGE INTRAVENOUS ONCE
Status: COMPLETED | OUTPATIENT
Start: 2023-03-02 | End: 2023-03-02

## 2023-03-02 RX ORDER — METRONIDAZOLE 500 MG/100ML
500 INJECTION, SOLUTION INTRAVENOUS ONCE
Status: COMPLETED | OUTPATIENT
Start: 2023-03-02 | End: 2023-03-02

## 2023-03-02 RX ORDER — HYDROCODONE BITARTRATE AND ACETAMINOPHEN 5; 325 MG/1; MG/1
1 TABLET ORAL EVERY 6 HOURS PRN
Status: DISCONTINUED | OUTPATIENT
Start: 2023-03-02 | End: 2023-03-02

## 2023-03-02 RX ADMIN — HYDROMORPHONE HYDROCHLORIDE 2 MG: 2 INJECTION, SOLUTION INTRAMUSCULAR; INTRAVENOUS; SUBCUTANEOUS at 12:28

## 2023-03-02 RX ADMIN — SODIUM CHLORIDE 1000 ML: 0.9 INJECTION, SOLUTION INTRAVENOUS at 15:34

## 2023-03-02 RX ADMIN — LEVOFLOXACIN 750 MG: 750 INJECTION, SOLUTION INTRAVENOUS at 18:59

## 2023-03-02 RX ADMIN — HYDROMORPHONE HYDROCHLORIDE 0.5 MG: 1 INJECTION, SOLUTION INTRAMUSCULAR; INTRAVENOUS; SUBCUTANEOUS at 21:23

## 2023-03-02 RX ADMIN — HYDROMORPHONE HYDROCHLORIDE 2 MG: 2 INJECTION, SOLUTION INTRAMUSCULAR; INTRAVENOUS; SUBCUTANEOUS at 14:10

## 2023-03-02 RX ADMIN — ONDANSETRON 4 MG: 2 INJECTION INTRAMUSCULAR; INTRAVENOUS at 17:43

## 2023-03-02 RX ADMIN — SODIUM CHLORIDE 1000 ML: 0.9 INJECTION, SOLUTION INTRAVENOUS at 12:33

## 2023-03-02 RX ADMIN — METRONIDAZOLE 500 MG: 500 INJECTION, SOLUTION INTRAVENOUS at 19:02

## 2023-03-02 RX ADMIN — SODIUM CHLORIDE 125 ML/HR: 0.9 INJECTION, SOLUTION INTRAVENOUS at 19:08

## 2023-03-02 RX ADMIN — HYDROCODONE BITARTRATE AND ACETAMINOPHEN 1 TABLET: 5; 325 TABLET ORAL at 20:05

## 2023-03-02 RX ADMIN — HYDROMORPHONE HYDROCHLORIDE 1 MG: 1 INJECTION, SOLUTION INTRAMUSCULAR; INTRAVENOUS; SUBCUTANEOUS at 17:45

## 2023-03-02 RX ADMIN — SODIUM CHLORIDE 1000 ML: 0.9 INJECTION, SOLUTION INTRAVENOUS at 12:40

## 2023-03-02 NOTE — H&P
Stephanie 47 1995, 32 y o  male MRN: [id number]  Unit/Bed#: [address] [place] [address] Encounter: [id number]  Primary Care Provider: Lizbeth Alex DO   Date and time admitted to hospital: 3/2/2023 12:08 PM    Dehydration  Assessment & Plan  Per patient report    · BUN-normal  · IVF  · I's and O's  · Urine retention protocol in place    Sepsis [facility])  Assessment & Plan  Evident with leukocytosis, tachycardia, tachypnea, possible GI source    · Blood cultures-pending  · Stool cultures-pending  · Lactic acid-negative trend for a m , Pro-Rubin-pending  · No imaging on admission, consider CT  · Empiric Levaquin and metronidazole  · N p o   · GI consulted    Ileostomy present ([facility] )  Assessment & Plan  Recurrent,    · High output ostomy per patient report  · Continue I's and O's  · Managed by specialist at [facility]  · GI consulted  · Initiate Questran      Syncope  Assessment & Plan  From dehydration per patient report PTA    · Echo-pending  · Orthostatics-pending  · Telemetry in place  · Cardiac consulted        History of ulcerative colitis  Assessment & Plan  A/C, GI consulted recs appreciated    · Patient has history of ulcerative colitis, follows [facility] and outpatient GI      Abdominal pain  Assessment & Plan  Recurrent,    History of UC with multiple extensive abdominal surgeries, creation of a J-pouch and ileostomy  • Follows with specialist at [facility], reports that he does have surgery planned for April for revision  · No imaging on this admission, will obtain if pain repeats  · 2/26/2023: CT abdomen: Post colectomy  J-pouch is seen extending from the right lower quadrant to the rectum with similar appearance of mural thickening comparison to the prior exams   No other potential acute pathology visualized on CT of the abdomen and pelvis with IV with oral contrast     Chronic pain syndrome  Assessment & Plan  Unresolved,     · monitor pain meds to avoid OIH  ·  pt on opoid med complications and tolerance  · Patient not a candidate for OP Chronic pain management due to type of pain not on MSK/spinal in nature  · - reviewed  · Continue home duloxetine, 1 dose of Toradol in ED, and Dilaudid, Tylenol as needed, as needed Dilaudid  ·     Tachycardia  Assessment & Plan  Noted on arrival 130s to 160s    · EKG congruent  · Likely secondary to underlying pain, control pain  · Consider rate control if unresolved    VTE Pharmacologic Prophylaxis:   Moderate Risk (Score 3-4) - Pharmacological DVT Prophylaxis Ordered: enoxaparin (Lovenox)  Code Status: Level 1 - Full Code   Discussion with family: Updated  (significant other) at bedside  Anticipated Length of Stay: Patient will be admitted on an inpatient basis with an anticipated length of stay of greater than 2 midnights secondary to Clinical course and specialist recommendations  Total Time Spent on Date of Encounter in care of patient: 55 minutes This time was spent on one or more of the following: performing physical exam; counseling and coordination of care; obtaining or reviewing history; documenting in the medical record; reviewing/ordering tests, medications or procedures; communicating with other healthcare professionals and discussing with patient's family/caregivers  Chief Complaint: Dehydration    History of Present Illness:  Chanda Mayfield is a 32 y o  male with a PMH of UC, ileostomy, chronic pain, recurrent diarrhea who presents with high output ostomy and dehydration  Patient reported 8 L of output from 8 PM last night so this morning in his ostomy bag  Patient reported going to work and then suddenly passing out at work and awaking right before EMS came  Patient stated that this is the worst episode he has had and it has been occurring every 3 to 4 weeks  Patient denied journaling foods or trigger items prior to episodes and stated he would in the future    Patient reported output in ostomy started off dark green and then turned light green  Patient reported improvement in symptoms of output since admission in ED with getting IV fluids  Patient reported that [facility] main concern is for any obstruction and encourage patient to wait until April for the revision of surgery on the colostomy bag  Patient denied any headache, blurry vision, chest pain, shortness of breath  Review of Systems:  Review of Systems   Constitutional: Negative for chills and fever  HENT: Negative for ear pain and sore throat  Eyes: Negative for pain and visual disturbance  Respiratory: Negative for cough and shortness of breath  Cardiovascular: Negative for chest pain and palpitations  Gastrointestinal: Positive for abdominal pain, diarrhea and nausea  Negative for anal bleeding and vomiting  Endocrine: Positive for polydipsia  Genitourinary: Negative for dysuria and hematuria  Musculoskeletal: Negative for arthralgias and back pain  Skin: Negative for color change and rash  Neurological: Positive for weakness  Negative for seizures and syncope  Psychiatric/Behavioral: Positive for sleep disturbance  All other systems reviewed and are negative  Past Medical and Surgical History:   Past Medical History:   Diagnosis Date   • Ankylosing spondylitis ([facility] )    • Anxiety    • Bowel obstruction ([place] [address] )    • Clostridium difficile colitis 9/13/2018   • Colitis    • 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[facility] ) 9/13/2018   • Pancreatitis    • Ulcerative colitis ([facility] )        Past Surgical History:   Procedure Laterality Date   • COLECTOMY TOTAL      with ileal pouch and anastemosis   • IR PICC PLACEMENT SINGLE LUMEN  3/1/2022   • TOTAL COLECTOMY         Meds/Allergies:  Prior to Admission medications    Medication Sig Start Date End Date Taking?  Authorizing Provider   acetaminophen (TYLENOL) 325 mg tablet Take 2 tablets (650 mg total) by mouth every 6 (six) hours as needed for mild pain, headaches or fever 1/24/23   Darlin Martinez,  ALPRAZolam (XANAX) 0 5 mg tablet 1-2 po q8hrs prn anxiety 2/7/23   Abilene Mood, DO   diphenoxylate-atropine (LOMOTIL) 2 5-0 025 mg per tablet Take 1 tablet by mouth 2 (two) times a day as needed for diarrhea for up to 10 days Prior to meals 2/20/23 3/2/23  Nba Donovan MD   DULoxetine (CYMBALTA) 60 mg delayed release capsule Take 1 capsule (60 mg total) by mouth daily 2/7/23   Abilene Mood, DO   HYDROcodone-acetaminophen (Norco) 5-325 mg per tablet Take 1 tablet by mouth every 6 (six) hours as needed for pain for up to 10 days Max Daily Amount: 4 tablets 2/27/23 3/9/23  Olubusola DAMASO Pena DO   metroNIDAZOLE (FLAGYL) 500 mg tablet Take 1 tablet (500 mg total) by mouth every 8 (eight) hours for 14 days 2/27/23 3/13/23  Ollucia Pena DO   ondansetron (ZOFRAN-ODT) 4 mg disintegrating tablet Take 1 tablet (4 mg total) by mouth every 6 (six) hours as needed for nausea for up to 3 days 1/18/23 2/16/23  Cassie King DO     I have reviewed home medications using recent Epic encounter  Allergies:    Allergies   Allergen Reactions   • Cefazolin Hives     Other reaction(s): Arrythmia (Abnormal Heartbeat), Rash Maculopapular   • Mesalamine GI Intolerance     Other reaction(s): Pancreatitis  [facility] [id number]: pancreatitis   • Wound Dressings Rash     Coloplast ostomy Products        Social History:  Marital Status: Single   Occupation: [employer]  Patient Pre-hospital Living Situation: Home  Patient Pre-hospital Level of Mobility: walks  Patient Pre-hospital Diet Restrictions:   Substance Use History:   Social History     Substance and Sexual Activity   Alcohol Use Not Currently    Comment: pt states 5 a month/socially     Social History     Tobacco Use   Smoking Status Never   Smokeless Tobacco Never     Social History     Substance and Sexual Activity   Drug Use No       Family History:  Family History   Problem Relation Age of Onset   • Lung disease Neg Hx        Physical Exam:     Vitals: Blood Pressure: 127/75 (03/02/23 1755)  Pulse: 88 (03/02/23 1755)  Temperature: 98 °F (36 7 °C) (03/02/23 1755)  Temp Source: Oral (03/02/23 1755)  Respirations: 18 (03/02/23 1755)  SpO2: 99 % (03/02/23 1755)    Physical Exam  Vitals and nursing note reviewed  Constitutional:       General: He is not in acute distress  Appearance: He is well-developed  HENT:      Head: Normocephalic and atraumatic  Eyes:      Conjunctiva/sclera: Conjunctivae normal    Cardiovascular:      Rate and Rhythm: Normal rate and regular rhythm  Heart sounds: No murmur heard  Pulmonary:      Effort: Pulmonary effort is normal  No respiratory distress  Breath sounds: Normal breath sounds  Abdominal:      Palpations: Abdomen is soft  Tenderness: There is abdominal tenderness  There is no guarding  Comments: Ostomy present   Musculoskeletal:         General: No swelling  Cervical back: Neck supple  Right lower leg: No edema  Left lower leg: No edema  Skin:     General: Skin is warm and dry  Capillary Refill: Capillary refill takes less than 2 seconds  Neurological:      Mental Status: He is alert     Psychiatric:         Mood and Affect: Mood normal           Additional Data:     Lab Results:  Results from last 7 days   Lab Units 03/02/23  1237   WBC Thousand/uL 11 73*   HEMOGLOBIN g/dL 13 0   HEMATOCRIT % 44 3   PLATELETS Thousands/uL 575*   NEUTROS PCT % 71   LYMPHS PCT % 16   MONOS PCT % 9   EOS PCT % 3     Results from last 7 days   Lab Units 03/02/23  1237   SODIUM mmol/L 136   POTASSIUM mmol/L 3 7   CHLORIDE mmol/L 101   CO2 mmol/L 24   BUN mg/dL 14   CREATININE mg/dL 1 07   ANION GAP mmol/L 11   CALCIUM mg/dL 9 7   ALBUMIN g/dL 4 7   TOTAL BILIRUBIN mg/dL 0 92   ALK PHOS U/L 97   ALT U/L 42   AST U/L 25   GLUCOSE RANDOM mg/dL 87                 Results from last 7 days   Lab Units 03/02/23  1237   LACTIC ACID mmol/L 1 7       Lines/Drains:  Invasive Devices     Peripheral Intravenous Line  Duration           Peripheral IV 03/02/23 Left Antecubital <1 day          Drain  Duration           Ileostomy Continent (Abdominal pouch)  days                    Imaging: Reviewed radiology reports from this admission including: abdominal/pelvic CT  No orders to display       EKG and Other Studies Reviewed on Admission:   · EKG: Sinus Tachycardia  HR 130s  ** Please Note: This note has been constructed using a voice recognition system   **

## 2023-03-02 NOTE — ASSESSMENT & PLAN NOTE
Evident with leukocytosis, tachycardia, tachypnea, possible GI source    · Blood cultures-pending  · Stool cultures-pending  · Lactic acid-negative trend for a m , Pro-Rubin-pending  · No imaging on admission, consider CT  · Empiric Levaquin and metronidazole  · N p o   · GI consulted

## 2023-03-02 NOTE — ASSESSMENT & PLAN NOTE
Noted on arrival 130s to 160s    · EKG congruent  · Likely secondary to underlying pain, control pain  · Consider rate control if unresolved

## 2023-03-02 NOTE — ASSESSMENT & PLAN NOTE
Recurrent,    · High output ostomy per patient report  · Continue I's and O's  · Managed by specialist at [facility]  · GI consulted  · [address]

## 2023-03-02 NOTE — ASSESSMENT & PLAN NOTE
A/C, GI consulted recs appreciated    · Patient has history of ulcerative colitis, follows [facility] and outpatient GI

## 2023-03-02 NOTE — ED PROVIDER NOTES
History  Chief Complaint   Patient presents with   • Abdominal Pain     Patient C/O RLQ abdominal pain with increased ileostomy output "I've dumped around 6 L of fluid last night  I feel dizzy and like I am going to pass out from the pain "      Patient has a significant history of ulcerative colitis, status post total colectomy with ileal pouch formation  Patient has had a very complicated history  He had multiple bouts of intra-abdominal infections, abscesses, and obstruction  Patient is scheduled for surgery in April  He has been seen multiple times with increased abdominal pain  In the last few days he has had much higher increased output from the ileostomy  He states he had about 8 L since yesterday  Patient has mild nausea but no vomiting  No fever  States he does not think he has an obstruction  However patient feels quite dehydrated  States has not made any urine today  Arrives awake and alert, in distress secondary to pain, tachypneic and tachycardic  He had been medicated for nausea by medics in route  I spoke to medics and police on arrival           Prior to Admission Medications   Prescriptions Last Dose Informant Patient Reported? Taking?    ALPRAZolam (XANAX) 0 5 mg tablet   No No   Si-2 po q8hrs prn anxiety   DULoxetine (CYMBALTA) 60 mg delayed release capsule   No No   Sig: Take 1 capsule (60 mg total) by mouth daily   HYDROcodone-acetaminophen (Norco) 5-325 mg per tablet   No No   Sig: Take 1 tablet by mouth every 6 (six) hours as needed for pain for up to 10 days Max Daily Amount: 4 tablets   acetaminophen (TYLENOL) 325 mg tablet   No No   Sig: Take 2 tablets (650 mg total) by mouth every 6 (six) hours as needed for mild pain, headaches or fever   diphenoxylate-atropine (LOMOTIL) 2 5-0 025 mg per tablet   No No   Sig: Take 1 tablet by mouth 2 (two) times a day as needed for diarrhea for up to 10 days Prior to meals   metroNIDAZOLE (FLAGYL) 500 mg tablet   No No   Sig: Take 1 tablet (500 mg total) by mouth every 8 (eight) hours for 14 days   ondansetron (ZOFRAN-ODT) 4 mg disintegrating tablet   No No   Sig: Take 1 tablet (4 mg total) by mouth every 6 (six) hours as needed for nausea for up to 3 days      Facility-Administered Medications: None       Past Medical History:   Diagnosis Date   • Ankylosing spondylitis (HCC)    • Anxiety    • Bowel obstruction (HCC)    • Clostridium difficile colitis 9/13/2018   • Colitis    • 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[facility] ) 9/13/2018   • Pancreatitis    • Ulcerative colitis ([email or web address] )        Past Surgical History:   Procedure Laterality Date   • COLECTOMY TOTAL      with ileal pouch and anastemosis   • IR PICC PLACEMENT SINGLE LUMEN  3/1/2022   • TOTAL COLECTOMY         Family History   Problem Relation Age of Onset   • Lung disease Neg Hx      I have reviewed and agree with the history as documented  E-Cigarette/Vaping   • E-Cigarette Use Never User      E-Cigarette/Vaping Substances   • Nicotine No    • THC No    • CBD No    • Flavoring No    • Other No    • Unknown No      Social History     Tobacco Use   • Smoking status: Never   • Smokeless tobacco: Never   Vaping Use   • Vaping Use: Never used   Substance Use Topics   • Alcohol use: Not Currently     Comment: pt states 5 a month/socially   • Drug use: No       Review of Systems   Constitutional: Positive for fatigue and unexpected weight change  Negative for chills and fever  HENT: Negative for congestion  Eyes: Negative for visual disturbance  Respiratory: Negative for cough and shortness of breath  Cardiovascular: Negative for chest pain and leg swelling  Gastrointestinal: Positive for abdominal pain, diarrhea and nausea  Negative for vomiting  Genitourinary: Positive for decreased urine volume  Negative for dysuria  Musculoskeletal: Negative for arthralgias and back pain  Skin: Negative for color change and rash  Neurological: Positive for weakness     Hematological: Does not bruise/bleed easily  Psychiatric/Behavioral: Negative for confusion  All other systems reviewed and are negative  Physical Exam  Physical Exam  Vitals reviewed  Constitutional:       General: He is in acute distress  HENT:      Head: Normocephalic  Mouth/Throat:      Mouth: Mucous membranes are moist    Eyes:      Extraocular Movements: Extraocular movements intact  Cardiovascular:      Rate and Rhythm: Regular rhythm  Tachycardia present  Heart sounds: Normal heart sounds  Pulmonary:      Breath sounds: Normal breath sounds  Comments: Tachypneic  No wheezing  Abdominal:      General: Abdomen is flat  Bowel sounds are normal       Palpations: Abdomen is soft  Tenderness: There is generalized abdominal tenderness  Comments: Ileostomy site is clean and dry  Bag has been recently changed   Skin:     General: Skin is warm and dry  Capillary Refill: Capillary refill takes less than 2 seconds  Neurological:      General: No focal deficit present  Mental Status: He is alert and oriented to person, place, and time  Psychiatric:         Mood and Affect: Mood is anxious           Behavior: Behavior normal          Vital Signs  ED Triage Vitals [03/02/23 1227]   Temperature Pulse Respirations Blood Pressure SpO2   97 6 °F (36 4 °C) (!) 160 (!) 26 136/89 97 %      Temp Source Heart Rate Source Patient Position - Orthostatic VS BP Location FiO2 (%)   Temporal Monitor Lying Right arm --      Pain Score       10 - Worst Possible Pain           Vitals:    03/02/23 1227 03/02/23 1300 03/02/23 1538   BP: 136/89  114/66   Pulse: (!) 160 (!) 120 84   Patient Position - Orthostatic VS: Lying  Lying         Visual Acuity      ED Medications  Medications   sodium chloride 0 9 % bolus 1,000 mL (1,000 mL Intravenous New Bag 3/2/23 1534)   sodium chloride 0 9 % bolus 1,000 mL (0 mL Intravenous Stopped 3/2/23 1539)   HYDROmorphone (DILAUDID) injection 2 mg (2 mg Intravenous Given 3/2/23 1228) sodium chloride 0 9 % bolus 1,000 mL (0 mL Intravenous Stopped 3/2/23 1539)   HYDROmorphone (DILAUDID) injection 2 mg (2 mg Intravenous Given 3/2/23 1410)       Diagnostic Studies  Results Reviewed     Procedure Component Value Units Date/Time    UA (URINE) with reflex to Scope [[id number]]  (Abnormal) Collected: 03/02/23 1505    Lab Status: Final result Specimen: Urine, Clean Catch Updated: 03/02/23 1514     Color, UA Yellow     Clarity, UA Slightly Cloudy     Specific Gravity, UA >=1 030     pH, UA 6 0     Leukocytes, UA Negative     Nitrite, UA Negative     Protein, UA Trace mg/dl      Glucose, UA Negative mg/dl      Ketones, UA Negative mg/dl      Urobilinogen, UA 0 2 E U /dl      Bilirubin, UA Small     Occult Blood, UA Negative    Urine Microscopic [[id number]] Collected: 03/02/23 1505    Lab Status:  In process Specimen: Urine, Clean Catch Updated: 03/02/23 1514    Comprehensive metabolic panel [[id number]] Collected: 03/02/23 1237    Lab Status: Final result Specimen: Blood from Arm, Left Updated: 03/02/23 1308     Sodium 136 mmol/L      Potassium 3 7 mmol/L      Chloride 101 mmol/L      CO2 24 mmol/L      ANION GAP 11 mmol/L      BUN 14 mg/dL      Creatinine 1 07 mg/dL      Glucose 87 mg/dL      Calcium 9 7 mg/dL      AST 25 U/L      ALT 42 U/L      Alkaline Phosphatase 97 U/L      Total Protein 7 8 g/dL      Albumin 4 7 g/dL      Total Bilirubin 0 92 mg/dL      eGFR 93 ml/min/1 73sq m     Narrative:      Luisa guidelines for Chronic Kidney Disease (CKD):   •  Stage 1 with normal or high GFR (GFR > 90 mL/min/1 73 square meters)  •  Stage 2 Mild CKD (GFR = 60-89 mL/min/1 73 square meters)  •  Stage 3A Moderate CKD (GFR = 45-59 mL/min/1 73 square meters)  •  Stage 3B Moderate CKD (GFR = 30-44 mL/min/1 73 square meters)  •  Stage 4 Severe CKD (GFR = 15-29 mL/min/1 73 square meters)  •  Stage 5 End Stage CKD (GFR <15 mL/min/1 73 square meters)  Note: GFR calculation is accurate only with a steady state creatinine    Lipase [[id number]]  (Normal) Collected: 03/02/23 1237    Lab Status: Final result Specimen: Blood from Arm, Left Updated: 03/02/23 1308     Lipase 21 u/L     Lactic acid [[id number]]  (Normal) Collected: 03/02/23 1237    Lab Status: Final result Specimen: Blood from Arm, Left Updated: 03/02/23 1307     LACTIC ACID 1 7 mmol/L     Narrative:      Result may be elevated if tourniquet was used during collection      CBC and differential [[id number]]  (Abnormal) Collected: 03/02/23 1237    Lab Status: Final result Specimen: Blood from Arm, Left Updated: 03/02/23 1248     WBC 11 73 Thousand/uL      RBC 7 00 Million/uL      Hemoglobin 13 0 g/dL      Hematocrit 44 3 %      MCV 63 fL      MCH 18 6 pg      MCHC 29 3 g/dL      RDW 18 9 %      MPV 8 7 fL      Platelets 823 Thousands/uL      nRBC 0 /100 WBCs      Neutrophils Relative 71 %      Immat GRANS % 0 %      Lymphocytes Relative 16 %      Monocytes Relative 9 %      Eosinophils Relative 3 %      Basophils Relative 1 %      Neutrophils Absolute 8 29 Thousands/µL      Immature Grans Absolute 0 04 Thousand/uL      Lymphocytes Absolute 1 92 Thousands/µL      Monocytes Absolute 1 01 Thousand/µL      Eosinophils Absolute 0 36 Thousand/µL      Basophils Absolute 0 11 Thousands/µL                  No orders to display              Procedures  ECG 12 Lead Documentation Only    Date/Time: 3/2/2023 12:25 PM  Performed by: Enzo Carver MD  Authorized by: Enzo Carver MD     Indications / Diagnosis:  Abdominal pain, tachycardia  ECG reviewed by me, the ED Provider: yes    Patient location:  ED  Interpretation:     Interpretation: normal    Rate:     ECG rate:  130    ECG rate assessment: tachycardic    Rhythm:     Rhythm: sinus tachycardia    Ectopy:     Ectopy: none    QRS:     QRS axis:  Normal    QRS intervals:  Normal  Conduction:     Conduction: normal    ST segments:     ST segments:  Normal  T waves:     T waves: normal               ED Course                                             Medical Decision Making  Patient is having high ileostomy output with signs of moderate severe dehydration  However he does not appear obstructed clinically  We will rehydrate aggressively, provide analgesics and antiemetics, reevaluate    Amount and/or Complexity of Data Reviewed  Labs: ordered  Risk  Prescription drug management  Disposition  Final diagnoses:   Abdominal pain   Ulcerative colitis ([facility] )   Dehydration     Time reflects when diagnosis was documented in both MDM as applicable and the Disposition within this note     Time User Action Codes Description Comment    3/2/2023  3:53 PM Alexandra JIMENEZ Add [R10 9] Abdominal pain     3/2/2023  3:53 PM Caty Hall Add [K51 90] Ulcerative colitis ([facility] )     3/2/2023  3:53 PM Caty Hall Add [E86 0] Dehydration       ED Disposition     ED Disposition   Admit    Condition   Stable    Date/Time   Thu Mar 2, 2023  3:53 PM    Comment   Case was discussed with hospitalist and the patient's admission status was agreed to be Admission Status: observation status to the service of Dr Ajay Mckeon  Follow-up Information    None         Patient's Medications   Discharge Prescriptions    No medications on file       No discharge procedures on file      PDMP Review       Value Time User    PDMP Reviewed  Yes 2/20/2023  9:51 AM Bubba Seaman MD          ED Provider  Electronically Signed by           Ewa Trimble MD  03/02/23 7620

## 2023-03-02 NOTE — ASSESSMENT & PLAN NOTE
Recurrent,    History of UC with multiple extensive abdominal surgeries, creation of a J-pouch and ileostomy  • Follows with specialist at [facility], reports that he does have surgery planned for April for revision  · No imaging on this admission, will obtain if pain repeats  · 2/26/2023: CT abdomen: Post colectomy  J-pouch is seen extending from the right lower quadrant to the rectum with similar appearance of mural thickening comparison to the prior exams   No other potential acute pathology visualized on CT of the abdomen and pelvis with IV with oral contrast

## 2023-03-02 NOTE — ASSESSMENT & PLAN NOTE
Unresolved,     · monitor pain meds to avoid OIH  ·  pt on opoid med complications and tolerance  · Patient not a candidate for OP Chronic pain management due to type of pain not on MSK/spinal in nature  · - reviewed  · Continue home duloxetine, 1 dose of Toradol in ED, and Dilaudid, Tylenol as needed, as needed Dilaudid  ·

## 2023-03-03 PROBLEM — R00.0 TACHYCARDIA: Status: RESOLVED | Noted: 2022-03-06 | Resolved: 2023-03-03

## 2023-03-03 LAB
ALBUMIN SERPL BCP-MCNC: 3.6 G/DL (ref 3.5–5)
ALP SERPL-CCNC: 87 U/L (ref 34–104)
ALT SERPL W P-5'-P-CCNC: 34 U/L (ref 7–52)
ANION GAP SERPL CALCULATED.3IONS-SCNC: 6 MMOL/L (ref 4–13)
AST SERPL W P-5'-P-CCNC: 25 U/L (ref 13–39)
BILIRUB SERPL-MCNC: 0.65 MG/DL (ref 0.2–1)
BUN SERPL-MCNC: 8 MG/DL (ref 5–25)
CALCIUM SERPL-MCNC: 8.4 MG/DL (ref 8.4–10.2)
CAMPYLOBACTER DNA SPEC NAA+PROBE: NORMAL
CHLORIDE SERPL-SCNC: 105 MMOL/L (ref 96–108)
CO2 SERPL-SCNC: 25 MMOL/L (ref 21–32)
CREAT SERPL-MCNC: 0.79 MG/DL (ref 0.6–1.3)
ERYTHROCYTE [DISTWIDTH] IN BLOOD BY AUTOMATED COUNT: 17 % (ref 11.6–15.1)
GFR SERPL CREATININE-BSD FRML MDRD: 121 ML/MIN/1.73SQ M
GLUCOSE P FAST SERPL-MCNC: 87 MG/DL (ref 65–99)
GLUCOSE SERPL-MCNC: 87 MG/DL (ref 65–140)
HCT VFR BLD AUTO: 33.3 % (ref 36.5–49.3)
HGB BLD-MCNC: 9.9 G/DL (ref 12–17)
LACTATE SERPL-SCNC: 1.4 MMOL/L (ref 0.5–2)
MCH RBC QN AUTO: 18.9 PG (ref 26.8–34.3)
MCHC RBC AUTO-ENTMCNC: 29.7 G/DL (ref 31.4–37.4)
MCV RBC AUTO: 64 FL (ref 82–98)
PLATELET # BLD AUTO: 352 THOUSANDS/UL (ref 149–390)
PMV BLD AUTO: 8.4 FL (ref 8.9–12.7)
POTASSIUM SERPL-SCNC: 3.7 MMOL/L (ref 3.5–5.3)
PROT SERPL-MCNC: 6 G/DL (ref 6.4–8.4)
RBC # BLD AUTO: 5.23 MILLION/UL (ref 3.88–5.62)
SALMONELLA DNA SPEC QL NAA+PROBE: NORMAL
SHIGA TOXIN STX GENE SPEC NAA+PROBE: NORMAL
SHIGELLA DNA SPEC QL NAA+PROBE: NORMAL
SODIUM SERPL-SCNC: 136 MMOL/L (ref 135–147)
WBC # BLD AUTO: 5.95 THOUSAND/UL (ref 4.31–10.16)

## 2023-03-03 RX ADMIN — HYDROMORPHONE HYDROCHLORIDE 0.5 MG: 1 INJECTION, SOLUTION INTRAMUSCULAR; INTRAVENOUS; SUBCUTANEOUS at 20:57

## 2023-03-03 RX ADMIN — HYDROMORPHONE HYDROCHLORIDE 0.5 MG: 1 INJECTION, SOLUTION INTRAMUSCULAR; INTRAVENOUS; SUBCUTANEOUS at 04:30

## 2023-03-03 RX ADMIN — SODIUM CHLORIDE 125 ML/HR: 0.9 INJECTION, SOLUTION INTRAVENOUS at 04:30

## 2023-03-03 RX ADMIN — ENOXAPARIN SODIUM 40 MG: 40 INJECTION SUBCUTANEOUS at 08:38

## 2023-03-03 RX ADMIN — DULOXETINE HYDROCHLORIDE 60 MG: 60 CAPSULE, DELAYED RELEASE ORAL at 08:38

## 2023-03-03 RX ADMIN — HYDROMORPHONE HYDROCHLORIDE 0.5 MG: 1 INJECTION, SOLUTION INTRAMUSCULAR; INTRAVENOUS; SUBCUTANEOUS at 16:29

## 2023-03-03 RX ADMIN — IRON SUCROSE 200 MG: 20 INJECTION, SOLUTION INTRAVENOUS at 12:02

## 2023-03-03 RX ADMIN — ONDANSETRON 4 MG: 2 INJECTION INTRAMUSCULAR; INTRAVENOUS at 18:27

## 2023-03-03 RX ADMIN — SODIUM CHLORIDE 125 ML/HR: 0.9 INJECTION, SOLUTION INTRAVENOUS at 23:14

## 2023-03-03 RX ADMIN — HYDROMORPHONE HYDROCHLORIDE 0.5 MG: 1 INJECTION, SOLUTION INTRAMUSCULAR; INTRAVENOUS; SUBCUTANEOUS at 00:38

## 2023-03-03 RX ADMIN — HYDROMORPHONE HYDROCHLORIDE 0.5 MG: 1 INJECTION, SOLUTION INTRAMUSCULAR; INTRAVENOUS; SUBCUTANEOUS at 12:03

## 2023-03-03 RX ADMIN — ALPRAZOLAM 0.5 MG: 0.5 TABLET ORAL at 12:35

## 2023-03-03 RX ADMIN — SODIUM CHLORIDE 125 ML/HR: 0.9 INJECTION, SOLUTION INTRAVENOUS at 16:36

## 2023-03-03 RX ADMIN — HYDROMORPHONE HYDROCHLORIDE 0.5 MG: 1 INJECTION, SOLUTION INTRAMUSCULAR; INTRAVENOUS; SUBCUTANEOUS at 08:38

## 2023-03-03 RX ADMIN — CHOLESTYRAMINE 4 G: 4 POWDER, FOR SUSPENSION ORAL at 08:38

## 2023-03-03 NOTE — PLAN OF CARE
Problem: PAIN - ADULT  Goal: Verbalizes/displays adequate comfort level or baseline comfort level  Description: Interventions:  - Encourage patient to monitor pain and request assistance  - Assess pain using appropriate pain scale  - Administer analgesics based on type and severity of pain and evaluate response  - Implement non-pharmacological measures as appropriate and evaluate response  - Consider cultural and social influences on pain and pain management  - Notify physician/advanced practitioner if interventions unsuccessful or patient reports new pain  Outcome: Progressing     Problem: INFECTION - ADULT  Goal: Absence or prevention of progression during hospitalization  Description: INTERVENTIONS:  - Assess and monitor for signs and symptoms of infection  - Monitor lab/diagnostic results  - Monitor all insertion sites, i e  indwelling lines, tubes, and drains  - Monitor endotracheal if appropriate and nasal secretions for changes in amount and color  - [place] appropriate cooling/warming therapies per order  - Administer medications as ordered  - Instruct and encourage patient and family to use good hand hygiene technique  - Identify and instruct in appropriate isolation precautions for identified infection/condition  Outcome: Progressing  Goal: Absence of fever/infection during neutropenic period  Description: INTERVENTIONS:  - Monitor WBC    Outcome: Progressing     Problem: DISCHARGE PLANNING  Goal: Discharge to home or other facility with appropriate resources  Description: INTERVENTIONS:  - Identify barriers to discharge w/patient and caregiver  - Arrange for needed discharge resources and transportation as appropriate  - Identify discharge learning needs (meds, wound care, etc )  - Arrange for interpretive services to assist at discharge as needed  - Refer to Case Management Department for coordinating discharge planning if the patient needs post-hospital services based on physician/advanced practitioner order or complex needs related to functional status, cognitive ability, or social support system  Outcome: Progressing     Problem: Knowledge Deficit  Goal: Patient/family/caregiver demonstrates understanding of disease process, treatment plan, medications, and discharge instructions  Description: Complete learning assessment and assess knowledge base    Interventions:  - Provide teaching at level of understanding  - Provide teaching via preferred learning methods  Outcome: Progressing     Problem: GASTROINTESTINAL - ADULT  Goal: Minimal or absence of nausea and/or vomiting  Description: INTERVENTIONS:  - Administer IV fluids if ordered to ensure adequate hydration  - Maintain NPO status until nausea and vomiting are resolved  - Nasogastric tube if ordered  - Administer ordered antiemetic medications as needed  - Provide nonpharmacologic comfort measures as appropriate  - Advance diet as tolerated, if ordered  - Consider nutrition services referral to assist patient with adequate nutrition and appropriate food choices  Outcome: Progressing  Goal: Maintains or returns to baseline bowel function  Description: INTERVENTIONS:  - Assess bowel function  - Encourage oral fluids to ensure adequate hydration  - Administer IV fluids if ordered to ensure adequate hydration  - Administer ordered medications as needed  - Encourage mobilization and activity  - Consider nutritional services referral to assist patient with adequate nutrition and appropriate food choices  Outcome: Progressing

## 2023-03-03 NOTE — PHYSICAL THERAPY NOTE
PHYSICAL THERAPY SCREEN     03/03/23 1130   Note Type   Note type Screen  (patient independent with all transfers, gait and ADLs  He is ambulating independently in his room for unlimited distances and able to manage his ostomy care independently as well   DC PT/OT)   Licensure   [place] License Number  John Butler PT [id number]

## 2023-03-03 NOTE — ASSESSMENT & PLAN NOTE
From dehydration per patient report PTA    · Echo-pending  · Orthostatics-pending  · Telemetry in place  · Cardiac consulted

## 2023-03-03 NOTE — PLAN OF CARE
Problem: PAIN - ADULT  Goal: Verbalizes/displays adequate comfort level or baseline comfort level  Description: Interventions:  - Encourage patient to monitor pain and request assistance  - Assess pain using appropriate pain scale  - Administer analgesics based on type and severity of pain and evaluate response  - Implement non-pharmacological measures as appropriate and evaluate response  - Consider cultural and social influences on pain and pain management  - Notify physician/advanced practitioner if interventions unsuccessful or patient reports new pain  Outcome: Progressing     Problem: INFECTION - ADULT  Goal: Absence or prevention of progression during hospitalization  Description: INTERVENTIONS:  - Assess and monitor for signs and symptoms of infection  - Monitor lab/diagnostic results  - Monitor all insertion sites, i e  indwelling lines, tubes, and drains  - Monitor endotracheal if appropriate and nasal secretions for changes in amount and color  - [place] appropriate cooling/warming therapies per order  - Administer medications as ordered  - Instruct and encourage patient and family to use good hand hygiene technique  - Identify and instruct in appropriate isolation precautions for identified infection/condition  Outcome: Progressing  Goal: Absence of fever/infection during neutropenic period  Description: INTERVENTIONS:  - Monitor WBC    Outcome: Progressing     Problem: DISCHARGE PLANNING  Goal: Discharge to home or other facility with appropriate resources  Description: INTERVENTIONS:  - Identify barriers to discharge w/patient and caregiver  - Arrange for needed discharge resources and transportation as appropriate  - Identify discharge learning needs (meds, wound care, etc )  - Arrange for interpretive services to assist at discharge as needed  - Refer to Case Management Department for coordinating discharge planning if the patient needs post-hospital services based on physician/advanced practitioner order or complex needs related to functional status, cognitive ability, or social support system  Outcome: Progressing     Problem: Knowledge Deficit  Goal: Patient/family/caregiver demonstrates understanding of disease process, treatment plan, medications, and discharge instructions  Description: Complete learning assessment and assess knowledge base    Interventions:  - Provide teaching at level of understanding  - Provide teaching via preferred learning methods  Outcome: Progressing     Problem: GASTROINTESTINAL - ADULT  Goal: Minimal or absence of nausea and/or vomiting  Description: INTERVENTIONS:  - Administer IV fluids if ordered to ensure adequate hydration  - Maintain NPO status until nausea and vomiting are resolved  - Nasogastric tube if ordered  - Administer ordered antiemetic medications as needed  - Provide nonpharmacologic comfort measures as appropriate  - Advance diet as tolerated, if ordered  - Consider nutrition services referral to assist patient with adequate nutrition and appropriate food choices  Outcome: Progressing  Goal: Maintains or returns to baseline bowel function  Description: INTERVENTIONS:  - Assess bowel function  - Encourage oral fluids to ensure adequate hydration  - Administer IV fluids if ordered to ensure adequate hydration  - Administer ordered medications as needed  - Encourage mobilization and activity  - Consider nutritional services referral to assist patient with adequate nutrition and appropriate food choices  Outcome: Progressing

## 2023-03-03 NOTE — PROGRESS NOTES
[facility] 43  Progress Note - Jamie Scott 1995, 32 y o  male MRN: [id number]  Unit/Bed#: [address] [place] [address] Encounter: [id number]  Primary Care Provider: Judson Briscoe DO   Date and time admitted to hospital: 3/2/2023 12:08 PM    Dehydration  Assessment & Plan  Per patient report    · BUN-normal  · IVF  · I's and O's  · Urine retention protocol in place    Sepsis [facility])  Assessment & Plan  Evident with leukocytosis, tachycardia, tachypnea, possible GI source    · Blood cultures-pending  · Stool cultures-pending  · Lactic acid-negative trend for a m , Pro-Rubin-pending  · No imaging on admission, consider CT  · Empiric Levaquin and metronidazole  · GI consulted    Ileostomy present ([facility] [address] )  Assessment & Plan  Recurrent,    · High output ostomy per patient report  · Continue I's and O's in hat and toilet  · Managed by specialist at [facility]  · GI consulted: BRODY SALGADO low fiber high residue per GI recs  · Patient refusing Questran      Syncope  Assessment & Plan  From dehydration per patient report PTA    · Echo-pending  · Orthostatics-pending  · Telemetry in place  · Cardiac : Orthostatic negative and echo 2 months ago WNL  No need to repeat echocardiogram, stay hydrated, consider compression socks        History of ulcerative colitis  Assessment & Plan  A/C, GI consulted recs appreciated    · Patient has history of ulcerative colitis, follows [facility] and outpatient GI      Abdominal pain  Assessment & Plan  Recurrent,    History of UC with multiple extensive abdominal surgeries, creation of a J-pouch and ileostomy  • Follows with specialist at [facility], reports that he does have surgery planned for April for revision  · No imaging on this admission, will obtain if pain repeats  · 2/26/2023: CT abdomen: Post colectomy  J-pouch is seen extending from the right lower quadrant to the rectum with similar appearance of mural thickening comparison to the prior exams   No other potential acute pathology visualized on CT of the abdomen and pelvis with IV with oral contrast     Chronic pain syndrome  Assessment & Plan  Unresolved,     · monitor pain meds to avoid OIH  ·  pt on opoid med complications and tolerance  · Patient not a candidate for OP Chronic pain management due to type of pain not on MSK/spinal in nature  · - reviewed  · Continue home duloxetine, 1 dose of Toradol in ED, and Dilaudid, Tylenol as needed, as needed Dilaudid  ·     Tachycardia-resolved as of 3/3/2023  Assessment & Plan  Noted on arrival 130s to 160s    · EKG congruent  · Likely secondary to underlying pain, control pain  · Consider rate control if unresolved          VTE Pharmacologic Prophylaxis:   Moderate Risk (Score 3-4) - Pharmacological DVT Prophylaxis Ordered: enoxaparin (Lovenox)  Patient Centered Rounds: I performed bedside rounds with nursing staff today  Discussions with Specialists or Other Care Team Provider: Cardiology and GI    Education and Discussions with Family / Patient: Patient declined call to   Total Time Spent on Date of Encounter in care of patient: 45 minutes This time was spent on one or more of the following: performing physical exam; counseling and coordination of care; obtaining or reviewing history; documenting in the medical record; reviewing/ordering tests, medications or procedures; communicating with other healthcare professionals and discussing with patient's family/caregivers  Current Length of Stay: 0 day(s)  Current Patient Status: Observation   Certification Statement: The patient will continue to require additional inpatient hospital stay due to Clinical course  Discharge Plan: Anticipate discharge tomorrow to home  Code Status: Level 1 - Full Code    Subjective:   Patient seen and examined at bedside,, reported improvement in output, stated about 400 mils overnight that he was emptying into the hat  Patient stated he refuses Questran and was ready to eat  Patient denied any other symptoms at this time    Objective:     Vitals:   Temp (24hrs), Av °F (36 7 °C), Min:97 6 °F (36 4 °C), Max:98 3 °F (36 8 °C)    Temp:  [97 6 °F (36 4 °C)-98 3 °F (36 8 °C)] 98 1 °F (36 7 °C)  HR:  [78-97] 90  Resp:  [18-19] 18  BP: (109-135)/(57-90) 109/57  SpO2:  [96 %-99 %] 96 %  Body mass index is 25 26 kg/m²  Input and Output Summary (last 24 hours): Intake/Output Summary (Last 24 hours) at 3/3/2023 1835  Last data filed at 3/3/2023 1632  Gross per 24 hour   Intake 1360 42 ml   Output 1600 ml   Net -239 58 ml       Physical Exam:   Physical Exam  Vitals and nursing note reviewed  Constitutional:       General: He is not in acute distress  Appearance: He is well-developed  HENT:      Head: Normocephalic and atraumatic  Eyes:      Conjunctiva/sclera: Conjunctivae normal    Cardiovascular:      Rate and Rhythm: Normal rate and regular rhythm  Heart sounds: No murmur heard  Pulmonary:      Effort: Pulmonary effort is normal  No respiratory distress  Breath sounds: Normal breath sounds  No wheezing  Abdominal:      Palpations: Abdomen is soft  Tenderness: There is no abdominal tenderness  There is no guarding  Hernia: No hernia is present  Musculoskeletal:         General: No swelling  Cervical back: Neck supple  Skin:     General: Skin is warm and dry  Capillary Refill: Capillary refill takes less than 2 seconds  Neurological:      Mental Status: He is alert and oriented to person, place, and time  Psychiatric:         Mood and Affect: Mood normal          Behavior: Behavior normal          Thought Content:  Thought content normal           Additional Data:     Labs:  Results from last 7 days   Lab Units 23  0456 23  1237   WBC Thousand/uL 5 95 11 73*   HEMOGLOBIN g/dL 9 9* 13 0   HEMATOCRIT % 33 3* 44 3   PLATELETS Thousands/uL 352 575*   NEUTROS PCT %  --  71   LYMPHS PCT %  --  16   MONOS PCT %  --  9   EOS PCT % --  3     Results from last 7 days   Lab Units 03/03/23  0456   SODIUM mmol/L 136   POTASSIUM mmol/L 3 7   CHLORIDE mmol/L 105   CO2 mmol/L 25   BUN mg/dL 8   CREATININE mg/dL 0 79   ANION GAP mmol/L 6   CALCIUM mg/dL 8 4   ALBUMIN g/dL 3 6   TOTAL BILIRUBIN mg/dL 0 65   ALK PHOS U/L 87   ALT U/L 34   AST U/L 25   GLUCOSE RANDOM mg/dL 87                 Results from last 7 days   Lab Units 03/03/23  0456 03/02/23  1237   LACTIC ACID mmol/L 1 4 1 7   PROCALCITONIN ng/ml  --  0 09       Lines/Drains:  Invasive Devices     Peripheral Intravenous Line  Duration           Peripheral IV 03/02/23 Left Antecubital 1 day          Drain  Duration           Ileostomy Continent (Abdominal pouch)  days                      Imaging: No pertinent imaging reviewed  Recent Cultures (last 7 days):   Results from last 7 days   Lab Units 03/02/23  2310   BLOOD CULTURE  Received in Microbiology Lab  Culture in Progress  Received in Microbiology Lab  Culture in Progress  Last 24 Hours Medication List:   Current Facility-Administered Medications   Medication Dose Route Frequency Provider Last Rate   • acetaminophen  650 mg Oral Q6H PRN Kasey Bolanos MD     • ALPRAZolam  0 5 mg Oral TID PRN Kasey Bolanos MD     • cholestyramine sugar free  4 g Oral BID Kasey Bolanos MD     • DULoxetine  60 mg Oral Daily Kasey Bolanos MD     • enoxaparin  40 mg Subcutaneous Daily Kasey Bolanos MD     • HYDROmorphone  0 5 mg Intravenous Q3H PRN Frederick Payton PA-C     • ondansetron  4 mg Intravenous Q6H PRN Kasey Bolanos MD     • sodium chloride  125 mL/hr Intravenous Continuous Kasey Bolanos  mL/hr (03/03/23 1636)        Today, Patient Was Seen By: Kasey Bolanos MD    **Please Note: This note may have been constructed using a voice recognition system  **

## 2023-03-03 NOTE — UTILIZATION REVIEW
Initial Clinical Review    Observation 3/2/23 @ 24-20-52-61 converted to inpatient admission 3/3/23 @ 2007 for continued care & tx for sepsis, abd pain  Admission: Date/Time/Statement:   Admission Orders (From admission, onward)     Ordered        03/03/23 2007  Inpatient Admission  Once            03/02/23 1554  Place in Observation  Once                      Orders Placed This Encounter   Procedures   • Inpatient Admission     Standing Status:   Standing     Number of Occurrences:   1     Order Specific Question:   Level of Care     Answer:   Med Surg [16]     Order Specific Question:   Estimated length of stay     Answer:   More than 2 Midnights     Order Specific Question:   Certification     Answer:   I certify that inpatient services are medically necessary for this patient for a duration of greater than two midnights  See H&P and MD Progress Notes for additional information about the patient's course of treatment  ED Arrival Information     Expected   -    Arrival   3/2/2023 12:07    Acuity   Emergent            Means of arrival   Ambulance    Escorted by   [id number]    Admission type   Emergency            Arrival complaint   -           Chief Complaint   Patient presents with   • Abdominal Pain     Patient C/O RLQ abdominal pain with increased ileostomy output "I've dumped around 6 L of fluid last night  I feel dizzy and like I am going to pass out from the pain "        Initial Presentation:   32 yom to ER from home via EMS c/o increased output from the ileostomy  He states he had about 8 L since yesterday with associated N&V, felt lightheaded & passed out  Hx ulcerative colitis, status post total colectomy with ileal pouch formation  Presents tachycardic, tachypneic, generalized abdominal tenderness  Ileostomy site is clean and dry  Bag has been recently changed  Admission work-up showing leukocytosis  Placed in observation status for dehydration, sepsis   Cultures pending  Observation to IP admission 3/3/23    Continues to require multiple doses IV Dilaudid for abd pain mgt  Remains NPO with IVF maintained  IVABT in progress, IVF maintained  GI & cardio consulted  Per GI: abd pain   Patient reports that output has decreased with enteric panel for stool being negative again  Can advance diet as tolerated as patient reports that he did tolerate food that was brought into him    Per cardio:   1  Syncope/near syncope secondary to dehydration  2  Dehydration with high ileoconduit output  3  History of frequent intra-abdominal infections  4  Ulcerative colitis  Continue IVF & IVABT  Orthostatic vital signs are negative and echocardiogram from 2 months ago was within normal limits  No need to repeat echocardiogram  Encourage patient to stay well-hydrated especially when he has high ileal conduit output  May consider using compression stockings during the waking hours      ED Triage Vitals [03/02/23 1227]   Temperature Pulse Respirations Blood Pressure SpO2   97 6 °F (36 4 °C) (!) 160 (!) 26 136/89 97 %      Temp Source Heart Rate Source Patient Position - Orthostatic VS BP Location FiO2 (%)   Temporal Monitor Lying Right arm --      Pain Score       10 - Worst Possible Pain          Wt Readings from Last 1 Encounters:   03/02/23 77 6 kg (171 lb 1 2 oz)     Additional Vital Signs:   03/02/23 2317 97 6 °F (36 4 °C) 97 18 123/81 96 98 % None (Room air) Lying   03/02/23 2000 -- -- -- 115/73 90 -- -- Sitting - Orthostatic VS   03/02/23 1957 -- -- -- 135/90 108 -- -- Standing - Orthostatic VS   03/02/23 1900 98 3 °F (36 8 °C) 88 18 125/78 96 98 % None (Room air) Lying - Orthostatic VS   03/02/23 1755 98 °F (36 7 °C) 88 18 127/75 -- 99 % None (Room air) Lying   03/02/23 1746 -- 101 20 128/73 -- 96 % None (Room air) Lying   03/02/23 1600 -- 84 14 -- -- -- -- --   03/02/23 1545 -- 92 16 114/66 86 -- -- --   03/02/23 1538 97 8 °F (36 6 °C) 84 16 114/66 -- 95 % None (Room air) Lying 03/02/23 1530 -- 84 15 -- -- -- -- --   03/02/23 1300 -- 120 Abnormal  24 Abnormal  -- -- 92 % -- --   03/02/23 1242 -- -- -- -- -- -- None (Room air) --   03/02/23 1227 97 6 °F (36 4 °C) 160 Abnormal  26 Abnormal  136/89 -- 97 % None (Room air) Lying     Pertinent Labs/Diagnostic Test Results:   3/2 Ekg=  Rhythm: sinus tachycardia     Ectopy:     Ectopy: none     QRS:     QRS axis:  Normal     QRS intervals:  Normal   Conduction:     Conduction: normal     ST segments:     ST segments:  Normal   T waves:     T waves: normal     Results from last 7 days   Lab Units 03/04/23  0539 03/03/23 0456 03/02/23  1237 02/27/23  1534 02/26/23  1819   WBC Thousand/uL 4 95 5 95 11 73* 8 22 7 70   HEMOGLOBIN g/dL 9 4* 9 9* 13 0 10 2* 11 4*   HEMATOCRIT % 31 9* 33 3* 44 3 34 1* 37 8   PLATELETS Thousands/uL 334 352 575* 412* 524*   NEUTROS ABS Thousands/µL  --   --  8 29* 5 51 3 97     Results from last 7 days   Lab Units 03/03/23 0456 03/02/23 1237 02/27/23  1534 02/26/23  1819   SODIUM mmol/L 136 136 138 137   POTASSIUM mmol/L 3 7 3 7 4 1 3 7   CHLORIDE mmol/L 105 101 106 104   CO2 mmol/L 25 24 26 21   ANION GAP mmol/L 6 11 6 12   BUN mg/dL 8 14 11 11   CREATININE mg/dL 0 79 1 07 0 92 0 93   EGFR ml/min/1 73sq m 121 93 111 110   CALCIUM mg/dL 8 4 9 7 8 7 9 1     Results from last 7 days   Lab Units 03/03/23  0456 03/02/23 1237 02/27/23  1534 02/26/23  1819   AST U/L 25 25 36 30   ALT U/L 34 42 38 32   ALK PHOS U/L 87 97 75 90   TOTAL PROTEIN g/dL 6 0* 7 8 6 4 7 2   ALBUMIN g/dL 3 6 4 7 4 0 4 7   TOTAL BILIRUBIN mg/dL 0 65 0 92 0 54 0 45     Results from last 7 days   Lab Units 03/03/23  0456 03/02/23  1237 02/27/23  1534 02/26/23  1819   GLUCOSE RANDOM mg/dL 87 87 79 97     Results from last 7 days   Lab Units 03/02/23  1237   PROCALCITONIN ng/ml 0 09     Results from last 7 days   Lab Units 03/03/23  0456 03/02/23  1237   LACTIC ACID mmol/L 1 4 1 7     Results from last 7 days   Lab Units 03/02/23  1237 02/27/23  1534 02/26/23  1819   LIPASE u/L 21 40 44     Results from last 7 days   Lab Units 03/02/23  1505   CLARITY UA  Slightly Cloudy   COLOR UA  Yellow   SPEC GRAV UA  >=1 030   PH UA  6 0   GLUCOSE UA mg/dl Negative   KETONES UA mg/dl Negative   BLOOD UA  Negative   PROTEIN UA mg/dl Trace*   NITRITE UA  Negative   BILIRUBIN UA  Small*   UROBILINOGEN UA E U /dl 0 2   LEUKOCYTES UA  Negative   WBC UA /hpf 1-2   RBC UA /hpf 0-1   BACTERIA UA /hpf Occasional   EPITHELIAL CELLS WET PREP /hpf Occasional   MUCUS THREADS  Innumerable*     Results from last 7 days   Lab Units 03/03/23  0458   SALMONELLA SP PCR  None Detected   SHIGELLA SP/ENTEROINVASIVE E  COLI (EIEC)  None Detected   CAMPYLOBACTER SP (JEJUNI AND COLI)  None Detected   SHIGA TOXIN 1/SHIGA TOXIN 2  None Detected     Results from last 7 days   Lab Units 03/02/23  2310   BLOOD CULTURE  Received in Microbiology Lab  Culture in Progress  Received in Microbiology Lab  Culture in Progress       ED Treatment:   Medication Administration from 03/02/2023 1207 to 03/02/2023 1803       Date/Time Order Dose Route Action     03/02/2023 1233 EST sodium chloride 0 9 % bolus 1,000 mL 1,000 mL Intravenous New Bag     03/02/2023 1228 EST HYDROmorphone (DILAUDID) injection 2 mg 2 mg Intravenous Given     03/02/2023 1240 EST sodium chloride 0 9 % bolus 1,000 mL 1,000 mL Intravenous New Bag     03/02/2023 1410 EST HYDROmorphone (DILAUDID) injection 2 mg 2 mg Intravenous Given     03/02/2023 1534 EST sodium chloride 0 9 % bolus 1,000 mL 1,000 mL Intravenous New Bag     03/02/2023 1743 EST ondansetron (ZOFRAN) injection 4 mg 4 mg Intravenous Given     03/02/2023 1745 EST HYDROmorphone (DILAUDID) injection 1 mg 1 mg Intravenous Given        Past Medical History:   Diagnosis Date   • Ankylosing spondylitis ([facility] )    • Anxiety    • Bowel obstruction (HCC)    • Clostridium difficile colitis 9/13/2018   • Colitis    • 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[facility] ) 9/13/2018   • Pancreatitis    • Ulcerative colitis ([facility] ) Present on Admission:  • Abdominal pain  • Sepsis ([facility] )  • Chronic pain syndrome      Admitting Diagnosis: Dehydration [E86 0]  Ulcerative colitis (HCC) [K51 90]  Abdominal pain [R10 9]  Age/Sex: 32 y o  male  Admission Orders:  Pt/ot eval & tx  Scd/foot pumps  Consult GI  Orthostatic BP  Telemetry  Consult cardio    Scheduled Medications:  cholestyramine sugar free, 4 g, Oral, BID  DULoxetine, 60 mg, Oral, Daily  enoxaparin, 40 mg, Subcutaneous, Daily  iron sucrose (VENOFER) 200 mg in sodium chloride 0 9 % 100 mL IVPB, Once 3/3  levofloxacin (LEVAQUIN) IVPB 750 mg 150 mL, Daily  metroNIDAZOLE (FLAGYL) IVPB 500 mg 100 mL, Daily    Continuous IV Infusions:  sodium chloride, 125 mL/hr, Intravenous, Continuous    PRN Meds:  acetaminophen, 650 mg, Oral, Q6H PRN  ALPRAZolam, 0 5 mg, Oral, TID PRN  HYDROmorphone, 0 5 mg, Intravenous, Q3H PRN, 3/2 x1, 3/3 x4  ondansetron, 4 mg, Intravenous, Q6H PRN    Network Utilization Review Department  ATTENTION: Please call with any questions or concerns to [phone] and carefully listen to the prompts so that you are directed to the right person  All voicemails are confidential   Elisa Winters all requests for admission clinical reviews, approved or denied determinations and any other requests to dedicated fax number below belonging to the campus where the patient is receiving treatment   List of dedicated fax numbers for the Facilities:  [address] DENIALS (Administrative/Medical Necessity) [phone]   [address] (Maternity/NICU/Pediatrics) [phone]   [address] [phone]   [facility] [phone]   [facility] [phone]   [address] [place] [id number] [place] [address] 820 [facility] [address] [facility] [phone] [address] [place] Leon[address] [place] [address] 815 [facility] [phone]

## 2023-03-03 NOTE — ASSESSMENT & PLAN NOTE
Evident with leukocytosis, tachycardia, tachypnea, possible GI source    · Blood cultures-pending  · Stool cultures-pending  · Lactic acid-negative trend for a m , Pro-Rubin-pending  · No imaging on admission, consider CT  · Empiric Levaquin and metronidazole  · GI consulted

## 2023-03-03 NOTE — ASSESSMENT & PLAN NOTE
Recurrent,    · High output ostomy per patient report  · Continue I's and O's in hat and toilet  · Managed by specialist at [facility]  · GI consulted: BRODY SALGADO low fiber high residue per GI recs  · Patient refusing Christkristy Carlos

## 2023-03-03 NOTE — OCCUPATIONAL THERAPY NOTE
OT EVALUATION       03/03/23 1606   Note Type   Note type Screen   Additional Comments patient independent with all transfers, gait and ADLs  He is ambulating independently in his room for unlimited distances and able to manage his ostomy care independently as well   DC PT/OT   Licensure   [place] License Number  Bonnie Antoni Pepe [address]ver 87 OTR/L [id number]

## 2023-03-03 NOTE — CONSULTS
Consultation - Cardiology   Randall Mahajan 32 y o  male MRN: [id number]  Unit/Bed#: [address] [place] [address] Encounter: [id number]    Assessment/Plan     Assessment:  1  Syncope/near syncope secondary to dehydration  2  Dehydration with high ileoconduit output  3  History of frequent intra-abdominal infections  4  Ulcerative colitis      Plan:  Patient has been admitted to the hospitalist service  1  Continue IV fluids per primary team    2  Continue antibiotics per primary team    3  Orthostatic vital signs are negative and echocardiogram from 2 months ago was within normal limits  No need to repeat echocardiogram    4  Encourage patient to stay well-hydrated especially when he has high ileal conduit output    5  May consider using compression stockings during the waking hours    6  Follow-up with GI at [facility] regarding ulcerative colitis treatment    History of Present Illness   Physician Requesting Consult: Bubba Seaman MD  Reason for Consult / Principal Problem: Syncope in the setting of high output ileostomy drainage and dehydration      HPI: Randall Mahajan is a 32y o  year old male who presented to the emergency room with complaints of right lower quadrant abdomen pain and increased ileostomy output  Patient states he has been dumping out 6 to 8 L daily for the past few days  He has a history of ulcerative colitis status post colectomy and ileal pouch formation  He follows with GI at [facility]  He has been admitted frequently for intra-abdominal infections, abscesses and obstruction  He notes over the last few days his output from his ileostomy has increased which she was concerned for possible obstruction  In addition to increased ileostomy output patient has been anuric  He notes dizziness with generalized weakness and sensation as if he is going to pass out secondary to lightheadedness and pain  Patient was noted to be in sinus tachycardia on arrival to the emergency room    Heart rates have improved with IV hydration  Echocardiogram was performed on 2023 which demonstrated an EF of 60% with normal wall motion and systolic function  Inpatient consult to Cardiology  Consult performed by: KY Baldwin  Consult ordered by: Meliza Rizvi MD          Review of Systems   Constitutional: Positive for activity change and fatigue  Negative for appetite change and fever  HENT: Negative for congestion, facial swelling, postnasal drip, sneezing and trouble swallowing  Eyes: Negative  Negative for photophobia and visual disturbance  Respiratory: Negative  Negative for chest tightness and shortness of breath  Cardiovascular: Negative  Negative for chest pain, palpitations and leg swelling  Gastrointestinal: Positive for abdominal distention and diarrhea  Endocrine: Negative  Negative for polydipsia, polyphagia and polyuria  Genitourinary: Negative  Negative for difficulty urinating  Musculoskeletal: Negative  Skin: Negative  Neurological: Positive for dizziness, syncope, weakness and light-headedness  Hematological: Negative  Psychiatric/Behavioral: Negative          Historical Information   Past Medical History:   Diagnosis Date   • Ankylosing spondylitis ([facility] )    • Anxiety    • Bowel obstruction ([facility] )    • Clostridium difficile colitis 2018   • Colitis    • 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[facility] ) 2018   • Pancreatitis    • [facility] ([facility] )      Past Surgical History:   Procedure Laterality Date   • COLECTOMY TOTAL      with ileal pouch and anastemosis   • IR PICC PLACEMENT SINGLE LUMEN  3/1/2022   • TOTAL COLECTOMY       Social History     Substance and Sexual Activity   Alcohol Use Not Currently    Comment: pt states 5 a month/socially     Social History     Substance and Sexual Activity   Drug Use No     E-Cigarette/Vaping   • E-Cigarette Use Never User      E-Cigarette/Vaping Substances   • Nicotine No    • THC No    • CBD No    • Flavoring No    • Other No    • Unknown No      Social History     Tobacco Use   Smoking Status Never   Smokeless Tobacco Never     Family History:   Family History   Problem Relation Age of Onset   • Lung disease Neg Hx        Meds/Allergies   all current active meds have been reviewed, current meds:   Current Facility-Administered Medications   Medication Dose Route Frequency   • acetaminophen (TYLENOL) tablet 650 mg  650 mg Oral Q6H PRN   • ALPRAZolam (XANAX) tablet 0 5 mg  0 5 mg Oral TID PRN   • cholestyramine sugar free (QUESTRAN LIGHT) packet 4 g  4 g Oral BID   • DULoxetine (CYMBALTA) delayed release capsule 60 mg  60 mg Oral Daily   • enoxaparin (LOVENOX) subcutaneous injection 40 mg  40 mg Subcutaneous Daily   • HYDROmorphone (DILAUDID) injection 0 5 mg  0 5 mg Intravenous Q3H PRN   • ondansetron (ZOFRAN) injection 4 mg  4 mg Intravenous Q6H PRN   • sodium chloride 0 9 % infusion  125 mL/hr Intravenous Continuous    and PTA meds:   Prior to Admission Medications   Prescriptions Last Dose Informant Patient Reported? Taking?    ALPRAZolam (XANAX) 0 5 mg tablet   No No   Si-2 po q8hrs prn anxiety   DULoxetine (CYMBALTA) 60 mg delayed release capsule   No No   Sig: Take 1 capsule (60 mg total) by mouth daily   HYDROcodone-acetaminophen (Norco) 5-325 mg per tablet   No No   Sig: Take 1 tablet by mouth every 6 (six) hours as needed for pain for up to 10 days Max Daily Amount: 4 tablets   acetaminophen (TYLENOL) 325 mg tablet   No No   Sig: Take 2 tablets (650 mg total) by mouth every 6 (six) hours as needed for mild pain, headaches or fever   diphenoxylate-atropine (LOMOTIL) 2 5-0 025 mg per tablet   No No   Sig: Take 1 tablet by mouth 2 (two) times a day as needed for diarrhea for up to 10 days Prior to meals   metroNIDAZOLE (FLAGYL) 500 mg tablet   No No   Sig: Take 1 tablet (500 mg total) by mouth every 8 (eight) hours for 14 days   ondansetron (ZOFRAN-ODT) 4 mg disintegrating tablet   No No   Sig: Take 1 tablet (4 mg total) by mouth every 6 (six) hours as needed for nausea for up to 3 days      Facility-Administered Medications: None     Allergies   Allergen Reactions   • Cefazolin Hives     Other reaction(s): Arrythmia (Abnormal Heartbeat), Rash Maculopapular   • Mesalamine GI Intolerance     Other reaction(s): Pancreatitis  [facility] - [id number]: pancreatitis   • Wound Dressings Rash     Coloplast ostomy Products        Objective   Vitals: Blood pressure 129/70, pulse 78, temperature 98 °F (36 7 °C), temperature source Oral, resp  rate 19, height 5' 9" (1 753 m), weight 77 6 kg (171 lb 1 2 oz), SpO2 99 %  Orthostatic Blood Pressures    Flowsheet Row Most Recent Value   Blood Pressure 129/70 filed at 03/03/2023 4910   Patient Position - Orthostatic VS Lying filed at 03/03/2023 5141            Intake/Output Summary (Last 24 hours) at 3/3/2023 0848  Last data filed at 3/3/2023 2785  Gross per 24 hour   Intake 4360 42 ml   Output 500 ml   Net 3860 42 ml       Invasive Devices     Peripheral Intravenous Line  Duration           Peripheral IV 03/02/23 Left Antecubital <1 day          Drain  Duration           Ileostomy Continent (Abdominal pouch)  days                Physical Exam  Vitals and nursing note reviewed  Constitutional:       General: He is not in acute distress  Appearance: Normal appearance  He is normal weight  HENT:      Right Ear: External ear normal       Left Ear: External ear normal    Eyes:      General: No scleral icterus  Right eye: No discharge  Left eye: No discharge  Cardiovascular:      Rate and Rhythm: Normal rate and regular rhythm  Pulses: Normal pulses  Heart sounds: Normal heart sounds  Pulmonary:      Effort: Pulmonary effort is normal  No respiratory distress  Breath sounds: Normal breath sounds  Abdominal:      General: Bowel sounds are normal  There is no distension  Palpations: Abdomen is soft  Musculoskeletal:      Right lower leg: No edema        Left lower leg: No edema  Skin:     General: Skin is warm and dry  Capillary Refill: Capillary refill takes less than 2 seconds  Neurological:      General: No focal deficit present  Mental Status: He is alert and oriented to person, place, and time  Mental status is at baseline  Psychiatric:         Mood and Affect: Mood normal          Lab Results:   I have personally reviewed pertinent lab results  CBC with diff:   Results from last 7 days   Lab Units 03/03/23  0456   WBC Thousand/uL 5 95   RBC Million/uL 5 23   HEMOGLOBIN g/dL 9 9*   HEMATOCRIT % 33 3*   MCV fL 64*   MCH pg 18 9*   MCHC g/dL 29 7*   RDW % 17 0*   MPV fL 8 4*   PLATELETS Thousands/uL 352     CMP:   Results from last 7 days   Lab Units 03/03/23  0456   SODIUM mmol/L 136   CHLORIDE mmol/L 105   CO2 mmol/L 25   BUN mg/dL 8   CREATININE mg/dL 0 79   CALCIUM mg/dL 8 4   AST U/L 25   ALT U/L 34   ALK PHOS U/L 87   EGFR ml/min/1 73sq m 121       BNP:   Results from last 7 days   Lab Units 03/03/23  0456   POTASSIUM mmol/L 3 7   CHLORIDE mmol/L 105   CO2 mmol/L 25   BUN mg/dL 8   CREATININE mg/dL 0 79   CALCIUM mg/dL 8 4   EGFR ml/min/1 73sq m 121     Imaging: I have personally reviewed pertinent reports      EKG: Sinus tachycardia at a rate of 130  VTE Prophylaxis: Sequential compression device Merle Drones)     Code Status: Level 1 - Full Code  Advance Directive and Living Will:      Power of :    POLST:      Nilsa  Cardiology

## 2023-03-03 NOTE — CONSULTS
Consultation -[facility]   Susi Collins 32 y o  male MRN: [id number]    Unit/Bed#: [address] [place] [address] Encounter: [id number]      Physician Requesting Consult: Dr Christina Ruvalcaba     Reason for Consult / Principal Problem: hx of uc    HPI:   This is a 32 y o  male with a PMH of UC, ileostomy, chronic pain, recurrent diarrhea who presents with high output ostomy and dehydration  Patient reported 8 L of output from ostomy  Patient reported going to work and then suddenly passing out at work and awaking right before EMS came  Patient stated that this is the worst episode he has had and it has been occurring every 3 to 4 weeks  Patient reported output in ostomy started off dark green and then turned light green  Patient reported improvement in symptoms of output since admission in ED with getting IV fluids  Patient reported that [facility] main concern is for any obstruction and encourage patient to wait until April for the revision of surgery  Ct scan last done 2/26-Postsurgical changes  Post colectomy  J-pouch is seen extending from the right lower quadrant to the rectum with similar appearance of mural thickening comparison to the prior exams  No other potential acute pathology visualized on CT of the abdomen and pelvis with IV with oral contrast    Patient reports that he was just seen at [facility] and they basically told him he could do Questran or Imodium for high output from the ostomy but he was doing relatively well when he saw them  Plan is for surgery next month but he has no follow-up with them  Patient reports that his output from his ostomy is decreased since he has been here  He otherwise reports that he has had no sick contacts or recent travel or antibiotic use  Reports nausea but no vomiting  Stool enteric panel was ordered this admission again which was normal and has had multiple stool tests due to the high output from the ileostomy which have been negative      Allergies: Allergies   Allergen Reactions   • Cefazolin Hives     Other reaction(s): Arrythmia (Abnormal Heartbeat), Rash Maculopapular   • Mesalamine GI Intolerance     Other reaction(s): Pancreatitis  [facility] [id number]: pancreatitis   • Wound Dressings Rash     Coloplast ostomy Products        Medications:  Current Facility-Administered Medications:   •  acetaminophen (TYLENOL) tablet 650 mg, 650 mg, Oral, Q6H PRN, Geno Davila MD  •  ALPRAZolam Ricke Iggy) tablet 0 5 mg, 0 5 mg, Oral, TID PRN, Geno Davila MD  •  cholestyramine sugar free (QUESTRAN LIGHT) packet 4 g, 4 g, Oral, BID, Geno Davila MD  •  DULoxetine (CYMBALTA) delayed release capsule 60 mg, 60 mg, Oral, Daily, Geno Davila MD  •  enoxaparin (LOVENOX) subcutaneous injection 40 mg, 40 mg, Subcutaneous, Daily, Geno Davila MD  •  HYDROmorphone (DILAUDID) injection 0 5 mg, 0 5 mg, Intravenous, Q3H PRN, Emmanuelle Dodge PA-C, 0 5 mg at 03/03/23 0430  •  ondansetron (ZOFRAN) injection 4 mg, 4 mg, Intravenous, Q6H PRN, Geno Davila MD  •  sodium chloride 0 9 % infusion, 125 mL/hr, Intravenous, Continuous, Geno Davila MD, Last Rate: 125 mL/hr at 03/03/23 0430, 125 mL/hr at 03/03/23 0430    Past Medical history:  Past Medical History:   Diagnosis Date   • Ankylosing spondylitis ([facility] [place] [facility] )    • Anxiety    • Bowel obstruction ([facility] [place] [facility] )    • Clostridium difficile colitis 9/13/2018   • Colitis    • 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[facility] [facility] ) 9/13/2018   • Pancreatitis    • [facility]        Past Surgical History:   Past Surgical History:   Procedure Laterality Date   • COLECTOMY TOTAL      with ileal pouch and anastemosis   • IR PICC PLACEMENT SINGLE LUMEN  3/1/2022   • TOTAL COLECTOMY         Family History:   Family History   Problem Relation Age of Onset   • Lung disease Neg Hx        Social history:   Social History     Socioeconomic History   • Marital status: Single     Spouse name: Not on file   • Number of children: Not on file   • Years of education: Not on file   • Highest education level: Not on file   Occupational History   • Not on file   Tobacco Use   • Smoking status: Never   • Smokeless tobacco: Never   Vaping Use   • Vaping Use: Never used   Substance and Sexual Activity   • Alcohol use: Not Currently     Comment: pt states 5 a month/socially   • Drug use: No   • Sexual activity: Not on file   Other Topics Concern   • Not on file   Social History Narrative   • Not on file     Social Determinants of Health     Financial Resource Strain: Not on file   Food Insecurity: No Food Insecurity   • Worried About Running Out of Food in the Last Year: Never true   • Ran Out of Food in the Last Year: Never true   Transportation Needs: No Transportation Needs   • Lack of Transportation (Medical): No   • Lack of Transportation (Non-Medical):  No   Physical Activity: Not on file   Stress: Not on file   Social Connections: Not on file   Intimate Partner Violence: Not on file   Housing Stability: Low Risk    • Unable to Pay for Housing in the Last Year: No   • Number of Places Lived in the Last Year: 1   • Unstable Housing in the Last Year: No       Review of Systems: All other systems were reviewed and were negative, otherwise please refer to HPI    Physical Exam: /81 (BP Location: Right arm)   Pulse 97   Temp 97 6 °F (36 4 °C) (Oral)   Resp 18   Ht 5' 9" (1 753 m)   Wt 77 6 kg (171 lb 1 2 oz)   SpO2 98%   BMI 25 26 kg/m²     General Appearance:    Alert, cooperative, no distress, appears stated age   Head:    Normocephalic, without obvious abnormality, atraumatic   Eyes:    No scleral icterus           Mouth:  Mucosa moist   Neck:   Supple, symmetrical, trachea midline, no thyromegaly       Lungs:     Clear to auscultation bilaterally, respirations unlabored       Heart:    Regular rate and rhythm, S1 and S2 normal, no murmur, rub   or gallop     Abdomen:     Soft, non-tender, bowel sounds active all four quadrants,     no masses, no organomegaly   Genitalia: deferred   Rectal:   deferred   Extremities:   Extremities normal,no cyanosis or edema       Skin:   Skin color, texture, turgor normal, no rashes or lesions       Neurologic:   Grossly intact, no focal deficit           Lab Results:   Recent Results (from the past 24 hour(s))   CBC and differential    Collection Time: 03/02/23 12:37 PM   Result Value Ref Range    WBC 11 73 (H) 4 31 - 10 16 Thousand/uL    RBC 7 00 (H) 3 88 - 5 62 Million/uL    Hemoglobin 13 0 12 0 - 17 0 g/dL    Hematocrit 44 3 36 5 - 49 3 %    MCV 63 (L) 82 - 98 fL    MCH 18 6 (L) 26 8 - 34 3 pg    MCHC 29 3 (L) 31 4 - 37 4 g/dL    RDW 18 9 (H) 11 6 - 15 1 %    MPV 8 7 (L) 8 9 - 12 7 fL    Platelets 644 (H) 228 - 390 Thousands/uL    nRBC 0 /100 WBCs    Neutrophils Relative 71 43 - 75 %    Immat GRANS % 0 0 - 2 %    Lymphocytes Relative 16 14 - 44 %    Monocytes Relative 9 4 - 12 %    Eosinophils Relative 3 0 - 6 %    Basophils Relative 1 0 - 1 %    Neutrophils Absolute 8 29 (H) 1 85 - 7 62 Thousands/µL    Immature Grans Absolute 0 04 0 00 - 0 20 Thousand/uL    Lymphocytes Absolute 1 92 0 60 - 4 47 Thousands/µL    Monocytes Absolute 1 01 0 17 - 1 22 Thousand/µL    Eosinophils Absolute 0 36 0 00 - 0 61 Thousand/µL    Basophils Absolute 0 11 (H) 0 00 - 0 10 Thousands/µL   Comprehensive metabolic panel    Collection Time: 03/02/23 12:37 PM   Result Value Ref Range    Sodium 136 135 - 147 mmol/L    Potassium 3 7 3 5 - 5 3 mmol/L    Chloride 101 96 - 108 mmol/L    CO2 24 21 - 32 mmol/L    ANION GAP 11 4 - 13 mmol/L    BUN 14 5 - 25 mg/dL    Creatinine 1 07 0 60 - 1 30 mg/dL    Glucose 87 65 - 140 mg/dL    Calcium 9 7 8 4 - 10 2 mg/dL    AST 25 13 - 39 U/L    ALT 42 7 - 52 U/L    Alkaline Phosphatase 97 34 - 104 U/L    Total Protein 7 8 6 4 - 8 4 g/dL    Albumin 4 7 3 5 - 5 0 g/dL    Total Bilirubin 0 92 0 20 - 1 00 mg/dL    eGFR 93 ml/min/1 73sq m   Lipase    Collection Time: 03/02/23 12:37 PM   Result Value Ref Range    Lipase 21 11 - 82 u/L   Lactic acid    Collection Time: 03/02/23 12:37 PM   Result Value Ref Range    LACTIC ACID 1 7 0 5 - 2 0 mmol/L   Procalcitonin    Collection Time: 03/02/23 12:37 PM   Result Value Ref Range    Procalcitonin 0 09 <=0 25 ng/ml   UA (URINE) with reflex to Scope    Collection Time: 03/02/23  3:05 PM   Result Value Ref Range    Color, UA Yellow     Clarity, UA Slightly Cloudy     Specific Gravity, UA >=1 030 1 000 - 1 030    pH, UA 6 0 5 0, 5 5, 6 0, 6 5, 7 0, 7 5, 8 0, 8 5, 9 0    Leukocytes, UA Negative Negative    Nitrite, UA Negative Negative    Protein, UA Trace (A) Negative mg/dl    Glucose, UA Negative Negative mg/dl    Ketones, UA Negative Negative mg/dl    Urobilinogen, UA 0 2 0 2, 1 0 E U /dl E U /dl    Bilirubin, UA Small (A) Negative    Occult Blood, UA Negative Negative   Urine Microscopic    Collection Time: 03/02/23  3:05 PM   Result Value Ref Range    RBC, UA 0-1 None Seen, 0-1, 1-2, 2-4, 0-5 /hpf    WBC, UA 1-2 None Seen, 0-1, 1-2, 0-5, 2-4 /hpf    Epithelial Cells Occasional None Seen, Occasional /hpf    Bacteria, UA Occasional None Seen, Occasional /hpf    MUCUS THREADS Innumerable (A) None Seen   Lactic acid, plasma    Collection Time: 03/03/23  4:56 AM   Result Value Ref Range    LACTIC ACID 1 4 0 5 - 2 0 mmol/L   Comprehensive metabolic panel    Collection Time: 03/03/23  4:56 AM   Result Value Ref Range    Sodium 136 135 - 147 mmol/L    Potassium 3 7 3 5 - 5 3 mmol/L    Chloride 105 96 - 108 mmol/L    CO2 25 21 - 32 mmol/L    ANION GAP 6 4 - 13 mmol/L    BUN 8 5 - 25 mg/dL    Creatinine 0 79 0 60 - 1 30 mg/dL    Glucose 87 65 - 140 mg/dL    Glucose, Fasting 87 65 - 99 mg/dL    Calcium 8 4 8 4 - 10 2 mg/dL    AST 25 13 - 39 U/L    ALT 34 7 - 52 U/L    Alkaline Phosphatase 87 34 - 104 U/L    Total Protein 6 0 (L) 6 4 - 8 4 g/dL    Albumin 3 6 3 5 - 5 0 g/dL    Total Bilirubin 0 65 0 20 - 1 00 mg/dL    eGFR 121 ml/min/1 73sq m   CBC (With Platelets)    Collection Time: 03/03/23  4:56 AM   Result Value Ref Range    WBC 5 95 4 31 - 10 16 Thousand/uL    RBC 5 23 3 88 - 5 62 Million/uL    Hemoglobin 9 9 (L) 12 0 - 17 0 g/dL    Hematocrit 33 3 (L) 36 5 - 49 3 %    MCV 64 (L) 82 - 98 fL    MCH 18 9 (L) 26 8 - 34 3 pg    MCHC 29 7 (L) 31 4 - 37 4 g/dL    RDW 17 0 (H) 11 6 - 15 1 %    Platelets 285 441 - 054 Thousands/uL    MPV 8 4 (L) 8 9 - 12 7 fL       Imaging Studies: XR chest portable    Result Date: 2/17/2023  Narrative: CHEST INDICATION:   fever, sirs  COMPARISON:  1/25/2023 1/18/2023 EXAM PERFORMED/VIEWS:  XR CHEST PORTABLE FINDINGS: Cardiomediastinal silhouette appears unremarkable  The lungs are clear  No pneumothorax or pleural effusion  Osseous structures appear within normal limits for patient age  Impression: No acute cardiopulmonary disease  Workstation performed: [id number]     XR abdomen obstruction series    Result Date: 2/27/2023  Narrative: OBSTRUCTION SERIES INDICATION:   abd pain/distention/vomiting  COMPARISON:  CT 2/26/2023 EXAM PERFORMED/VIEWS:  XR ABDOMEN OBSTRUCTION SERIES FINDINGS: There is a nonobstructive bowel gas pattern  Persistent gastric distention  Right lower abdomen ostomy, multiple pelvic surgical wire staples and posterior bowel gas again noted  No free air beneath the hemidiaphragms  No pathologic calcifications or soft tissue masses evident  Osseous structures are unremarkable  Examination of the chest reveals a normal cardiomediastinal silhouette  Lungs are clear  Impression: Nonobstructive bowel gas pattern  Workstation performed: [id number]     CT chest abdomen pelvis w contrast    Result Date: 2/10/2023  Narrative: CT CHEST, ABDOMEN AND PELVIS WITH IV CONTRAST INDICATION:   Sepsis sepsis, complicated surgical hx, recent CAP 1/26  Vomiting history of ileostomy; recent pneumonia COMPARISON:  1/26/2023; 1/25/2023; March 6, 2022; 1/18/2023; 11/6/2022 TECHNIQUE: CT examination of the chest, abdomen and pelvis was performed   Axial, sagittal, and coronal 2D reformatted images were created from the source data and submitted for interpretation  Radiation dose length product (DLP) for this visit:  598 53 mGy-cm   This examination, like all CT scans performed in the [facility], was performed utilizing techniques to minimize radiation dose exposure, including the use of iterative  reconstruction and automated exposure control  IV Contrast:  100 mL of iohexol (OMNIPAQUE) Enteric Contrast: Enteric contrast was administered  FINDINGS: CHEST LUNGS:  Bibasilar groundglass opacities are noted more denser consolidation was seen on the prior study of 1/26/2023  There is no tracheal or endobronchial lesion  PLEURA:  Unremarkable  HEART/GREAT VESSELS: Heart is unremarkable for patient's age  No thoracic aortic aneurysm  MEDIASTINUM AND GREGORIO:  Small prevascular node is unchanged superiorly  CHEST WALL AND LOWER NECK:  Unremarkable  ABDOMEN LIVER/BILIARY TREE:  Unremarkable  GALLBLADDER:  No calcified gallstones  No pericholecystic inflammatory change  SPLEEN:  Unremarkable  PANCREAS:  Unremarkable  ADRENAL GLANDS:  Unremarkable  KIDNEYS/URETERS:  Unremarkable  No hydronephrosis  STOMACH AND BOWEL:  Stomach is collapsed  Prominent small bowel loop is seen in the left upper quadrant  Also one prominent loop is seen within the pelvis  Loops are decompressed entering the right lower quadrant ostomy  A specific zone of transition  is not identified to suggest obstruction  The patient is status post colectomy  There appears to be a neorectum/J-pouch with surgical sutures which is distended with fluid extending from the anal verge proximally  The fluid-filled loop extends into the right lower quadrant  Some surgical sutures are noted which appears to be blind ending not communicating with small bowel  Noncommunication was also noted on the prior contrast study from January 18  Overall, this loop is somewhat more distended than the study of January 25    Some mucosal thickening of the pouch does persist   This segment was quite distended on a study in November  APPENDIX:  There are expected postoperative changes of appendectomy  ABDOMINOPELVIC CAVITY:  No ascites  No pneumoperitoneum  No lymphadenopathy  No evidence of collection  VESSELS:  Unremarkable for patient's age  PELVIS REPRODUCTIVE ORGANS:  Unremarkable for patient's age  URINARY BLADDER:  Unremarkable  ABDOMINAL WALL/INGUINAL REGIONS:  Midline incision scar is identified  Right lower quadrant ostomy is identified  OSSEOUS STRUCTURES:  No acute fracture or destructive osseous lesion  Mild sclerosis of the manubrium of the sternum on the right is identified again without change since March 2022  The vertebral endplates are well-defined  Impression: Bilateral pulmonary consolidations are now replaced by groundglass opacities although in a larger extent  Although these may represent resolution of prior consolidation, a new pneumonia is possible and should be correlated clinically with new pulmonary symptomatology  A few prominent small bowel loops are seen but without significant zone of transition to suggest obstruction at this time  Follow-up may be useful  There is more distention of the J-pouch with continued mucosal enhancement  Surgical assessment is advised given the variable distention of this segment prior scans  This was discussed with Dr Alok Fernandez at 10:50 AM Workstation performed: [id number]     CT abdomen pelvis with contrast    Result Date: 2/26/2023  Narrative: CT ABDOMEN AND PELVIS WITH IV CONTRAST INDICATION:   abd pain and vomiting  COMPARISON:  2/16/2023  TECHNIQUE:  CT examination of the abdomen and pelvis was performed  Axial, sagittal, and coronal 2D reformatted images were created from the source data and submitted for interpretation  Radiation dose length product (DLP) for this visit:  715 mGy-cm     This examination, like all CT scans performed in the [facility], was performed utilizing techniques to minimize radiation dose exposure, including the use of iterative reconstruction and automated exposure control  IV Contrast:  100 mL of iohexol (OMNIPAQUE) 50 mL of iohexol (OMNIPAQUE) Enteric Contrast:  Enteric contrast was administered  FINDINGS: ABDOMEN LOWER CHEST:  No clinically significant abnormality identified in the visualized lower chest  LIVER/BILIARY TREE:  Unremarkable  GALLBLADDER:  No calcified gallstones  No pericholecystic inflammatory change  SPLEEN:  Unremarkable  PANCREAS:  Unremarkable  ADRENAL GLANDS:  Unremarkable  KIDNEYS/URETERS:  Unremarkable  No hydronephrosis  STOMACH AND BOWEL:  Post colectomy  J-pouch is seen extending from the right lower quadrant to the rectum with similar appearance of mural thickening comparison to the prior exams  APPENDIX:  Not visualized ABDOMINOPELVIC CAVITY:  No ascites  No pneumoperitoneum  No lymphadenopathy  VESSELS:  Unremarkable for patient's age  PELVIS REPRODUCTIVE ORGANS:  Unremarkable for patient's age  URINARY BLADDER:  Unremarkable  ABDOMINAL WALL/INGUINAL REGIONS:  Right mid abdomen enterostomy    OSSEOUS STRUCTURES:  No acute fracture or destructive osseous lesion  Impression: Postsurgical changes above  Post colectomy  J-pouch is seen extending from the right lower quadrant to the rectum with similar appearance of mural thickening comparison to the prior exams  No other potential acute pathology visualized on CT of the abdomen and pelvis with IV with oral contrast  Workstation performed: [id number]     CT abdomen pelvis with contrast    Result Date: 2/16/2023  Narrative: CT ABDOMEN AND PELVIS WITH IV CONTRAST INDICATION:   Abdominal abscess/infection suspected Intra-abdominal abscess History of ulcerative colitis, intra-abdominal abscess  COMPARISON:  CT of the chest abdomen pelvis on February 10, 2023  TECHNIQUE:  CT examination of the abdomen and pelvis was performed   Axial, sagittal, and coronal 2D reformatted images were created from the source data and submitted for interpretation  Radiation dose length product (DLP) for this visit:  470 77 mGy-cm   This examination, like all CT scans performed in the [facility], was performed utilizing techniques to minimize radiation dose exposure, including the use of iterative  reconstruction and automated exposure control  IV Contrast:  30 mL of iohexol (OMNIPAQUE) 100 mL of iohexol (OMNIPAQUE) Enteric Contrast:  Enteric contrast was not administered  FINDINGS: ABDOMEN LOWER CHEST:  No clinically significant abnormality identified in the visualized lower chest  LIVER/BILIARY TREE:  Unremarkable  GALLBLADDER:  No calcified gallstones  No pericholecystic inflammatory change  SPLEEN:  Unremarkable  PANCREAS:  Unremarkable  ADRENAL GLANDS:  Unremarkable  KIDNEYS/URETERS:  Unremarkable  No hydronephrosis  STOMACH AND BOWEL:  Status post colectomy  Enteric contrast reaches the level of the right lower quadrant ileostomy without evidence of obstruction  The J pouch extending from the right lower quadrant to the rectum demonstrates a moderate amount of fluid within the lumen and mild wall thickening similar to prior examination  APPENDIX:  Absent ABDOMINOPELVIC CAVITY:  No ascites  No pneumoperitoneum  No lymphadenopathy  VESSELS:  Unremarkable for patient's age  PELVIS REPRODUCTIVE ORGANS:  Unremarkable for patient's age  URINARY BLADDER:  Unremarkable  ABDOMINAL WALL/INGUINAL REGIONS:  Unremarkable  OSSEOUS STRUCTURES:  No acute fracture or destructive osseous lesion  Impression: 1  Enteric contrast reaches the level the right lower quadrant ileostomy without evidence of obstruction  2   Redemonstrated mild wall thickening and fluid within the J-pouch similar to prior examination   Workstation performed: [id number]       Assessment/Plan-  Abdominal pain  Recurrent,   complex PMH including IBD status post colectomy with J-pouch formation, proctitis, surgery 10/18 for diverting ileostomy complicated by prolapse and localized intra-abdominal infection   Plan for reconstruction of pouch in April at [facility], scheduled for April 20  -Pt reports high output from ostomy, keep accurate I/O  • No imaging on this admission, will obtain if pain recurs  • 2/26/2023: CT abdomen: Post colectomy  J-pouch is seen extending from the right lower quadrant to the rectum with similar appearance of mural thickening comparison to the prior exams  No other potential acute pathology visualized on CT of the abdomen and pelvis with IV with oral contrast  • Patient reports that output has decreased with enteric panel for stool being negative again  • Can advance diet as tolerated as patient reports that he did tolerate food that was brought into him  • Recommend follow-up with surgery at [facility], if high output from ileostomy persist can try Questran or Imodium as they had discussed previously      Thank you for the consultation and case was discussed with Dr Susan Pedraza

## 2023-03-03 NOTE — ASSESSMENT & PLAN NOTE
From dehydration per patient report PTA    · Echo-pending  · Orthostatics-pending  · Telemetry in place  · Cardiac : Orthostatic negative and echo 2 months ago WNL    No need to repeat echocardiogram, stay hydrated, consider compression socks

## 2023-03-04 PROBLEM — E86.0 DEHYDRATION: Status: RESOLVED | Noted: 2018-09-14 | Resolved: 2023-03-04

## 2023-03-04 PROBLEM — R55 SYNCOPE: Status: RESOLVED | Noted: 2023-03-02 | Resolved: 2023-03-04

## 2023-03-04 LAB
ALBUMIN SERPL BCP-MCNC: 3.5 G/DL (ref 3.5–5)
ALP SERPL-CCNC: 82 U/L (ref 34–104)
ALT SERPL W P-5'-P-CCNC: 36 U/L (ref 7–52)
ANION GAP SERPL CALCULATED.3IONS-SCNC: 3 MMOL/L (ref 4–13)
AST SERPL W P-5'-P-CCNC: 23 U/L (ref 13–39)
ATRIAL RATE: 130 BPM
BILIRUB SERPL-MCNC: 0.49 MG/DL (ref 0.2–1)
BUN SERPL-MCNC: 4 MG/DL (ref 5–25)
CALCIUM SERPL-MCNC: 8.6 MG/DL (ref 8.4–10.2)
CHLORIDE SERPL-SCNC: 106 MMOL/L (ref 96–108)
CO2 SERPL-SCNC: 28 MMOL/L (ref 21–32)
CREAT SERPL-MCNC: 0.74 MG/DL (ref 0.6–1.3)
ERYTHROCYTE [DISTWIDTH] IN BLOOD BY AUTOMATED COUNT: 16.9 % (ref 11.6–15.1)
GFR SERPL CREATININE-BSD FRML MDRD: 124 ML/MIN/1.73SQ M
GLUCOSE SERPL-MCNC: 83 MG/DL (ref 65–140)
HCT VFR BLD AUTO: 31.9 % (ref 36.5–49.3)
HGB BLD-MCNC: 9.4 G/DL (ref 12–17)
MCH RBC QN AUTO: 18.8 PG (ref 26.8–34.3)
MCHC RBC AUTO-ENTMCNC: 29.5 G/DL (ref 31.4–37.4)
MCV RBC AUTO: 64 FL (ref 82–98)
P AXIS: 63 DEGREES
PLATELET # BLD AUTO: 334 THOUSANDS/UL (ref 149–390)
PMV BLD AUTO: 8.8 FL (ref 8.9–12.7)
POTASSIUM SERPL-SCNC: 3.6 MMOL/L (ref 3.5–5.3)
PR INTERVAL: 160 MS
PROCALCITONIN SERPL-MCNC: 0.13 NG/ML
PROT SERPL-MCNC: 6 G/DL (ref 6.4–8.4)
QRS AXIS: -16 DEGREES
QRSD INTERVAL: 80 MS
QT INTERVAL: 298 MS
QTC INTERVAL: 438 MS
RBC # BLD AUTO: 4.99 MILLION/UL (ref 3.88–5.62)
SODIUM SERPL-SCNC: 137 MMOL/L (ref 135–147)
T WAVE AXIS: 43 DEGREES
VENTRICULAR RATE: 130 BPM
WBC # BLD AUTO: 4.95 THOUSAND/UL (ref 4.31–10.16)

## 2023-03-04 RX ADMIN — HYDROMORPHONE HYDROCHLORIDE 0.5 MG: 1 INJECTION, SOLUTION INTRAMUSCULAR; INTRAVENOUS; SUBCUTANEOUS at 07:55

## 2023-03-04 RX ADMIN — SODIUM CHLORIDE 125 ML/HR: 0.9 INJECTION, SOLUTION INTRAVENOUS at 07:49

## 2023-03-04 RX ADMIN — HYDROMORPHONE HYDROCHLORIDE 0.5 MG: 1 INJECTION, SOLUTION INTRAMUSCULAR; INTRAVENOUS; SUBCUTANEOUS at 18:56

## 2023-03-04 RX ADMIN — HYDROMORPHONE HYDROCHLORIDE 0.5 MG: 1 INJECTION, SOLUTION INTRAMUSCULAR; INTRAVENOUS; SUBCUTANEOUS at 15:28

## 2023-03-04 RX ADMIN — SODIUM CHLORIDE 125 ML/HR: 0.9 INJECTION, SOLUTION INTRAVENOUS at 15:28

## 2023-03-04 RX ADMIN — HYDROMORPHONE HYDROCHLORIDE 0.5 MG: 1 INJECTION, SOLUTION INTRAMUSCULAR; INTRAVENOUS; SUBCUTANEOUS at 00:38

## 2023-03-04 RX ADMIN — DULOXETINE HYDROCHLORIDE 60 MG: 60 CAPSULE, DELAYED RELEASE ORAL at 10:30

## 2023-03-04 RX ADMIN — ENOXAPARIN SODIUM 40 MG: 40 INJECTION SUBCUTANEOUS at 10:31

## 2023-03-04 RX ADMIN — HYDROMORPHONE HYDROCHLORIDE 0.5 MG: 1 INJECTION, SOLUTION INTRAMUSCULAR; INTRAVENOUS; SUBCUTANEOUS at 22:19

## 2023-03-04 RX ADMIN — CHOLESTYRAMINE 4 G: 4 POWDER, FOR SUSPENSION ORAL at 17:30

## 2023-03-04 RX ADMIN — SODIUM CHLORIDE 125 ML/HR: 0.9 INJECTION, SOLUTION INTRAVENOUS at 22:19

## 2023-03-04 RX ADMIN — ONDANSETRON 4 MG: 2 INJECTION INTRAMUSCULAR; INTRAVENOUS at 15:28

## 2023-03-04 RX ADMIN — HYDROMORPHONE HYDROCHLORIDE 0.5 MG: 1 INJECTION, SOLUTION INTRAMUSCULAR; INTRAVENOUS; SUBCUTANEOUS at 04:02

## 2023-03-04 RX ADMIN — HYDROMORPHONE HYDROCHLORIDE 0.5 MG: 1 INJECTION, SOLUTION INTRAMUSCULAR; INTRAVENOUS; SUBCUTANEOUS at 12:16

## 2023-03-04 RX ADMIN — CHOLESTYRAMINE 4 G: 4 POWDER, FOR SUSPENSION ORAL at 10:31

## 2023-03-04 NOTE — ASSESSMENT & PLAN NOTE
Unresolved,     · monitor pain meds to avoid OIH  ·  pt on opoid meds complications and tolerance  · Patient not a candidate for OP Chronic pain management due to type of pain not on MSK/spinal in nature  · - reviewed  · Continue home duloxetine, previously prescribed Norco, Tylenol as needed,   · Filomena Butler, is giving a referral to their pain management program per patient report

## 2023-03-04 NOTE — UTILIZATION REVIEW
Continued Stay Review    Date:  3-4-23                      Current Patient Class: inpatient  Current Level of Care: med surg    HPI:27 y o  male initially admitted on 3-2-23     Assessment/Plan:     Continue IV fluids 125/hr for dehydration  High output from ileostomy  Temp max last evening 100 8  Pt refuses Questran  Continue to monitor intake/output  Monitor for urinary retention  Follow up blood and stool cultures  Discontinue empiric iv levaquin and flagyl  Due to syncope  monitor on telemetry, obtain orthostatic vital signs and echo    Patient received iv dilaudid for pain 0038, 0402, 0755, 1216     Addendum :  Stool bacterial panel normal       Vital Signs:     Date/Time Temp Pulse Resp BP MAP (mmHg) SpO2 O2 Device   03/04/23 0734 -- 63 19 110/59 80 97 % None (Room air)   03/04/23 0004 98 8 °F (37 1 °C) 72 -- 115/55 -- 95 % --   03/03/23 1909 100 8 °F   (38 2 °C)   Abnormal  77 18 108/66 81 96 % None (Room air)   03/03/23 1459 98 1 °F (36 7 °C) 90 18 109/57 74 96 % None (Room air)   03/03/23 0838 98 °F (36 7 °C) 78 19 129/70 91 99 % None (Room air)             Pertinent Labs/Diagnostic Results:       Results from last 7 days   Lab Units 03/04/23  0539 03/03/23  0456 03/02/23  1237 02/27/23  1534 02/26/23  1819   WBC Thousand/uL 4 95 5 95 11 73* 8 22 7 70   HEMOGLOBIN g/dL 9 4* 9 9* 13 0 10 2* 11 4*   HEMATOCRIT % 31 9* 33 3* 44 3 34 1* 37 8   PLATELETS Thousands/uL 334 352 575* 412* 524*   NEUTROS ABS Thousands/µL  --   --  8 29* 5 51 3 97         Results from last 7 days   Lab Units 03/04/23  0539 03/03/23  0456 03/02/23  1237 02/27/23  1534 02/26/23  1819   SODIUM mmol/L 137 136 136 138 137   POTASSIUM mmol/L 3 6 3 7 3 7 4 1 3 7   CHLORIDE mmol/L 106 105 101 106 104   CO2 mmol/L 28 25 24 26 21   ANION GAP mmol/L 3* 6 11 6 12   BUN mg/dL 4* 8 14 11 11   CREATININE mg/dL 0 74 0 79 1 07 0 92 0 93   EGFR ml/min/1 73sq m 124 121 93 111 110   CALCIUM mg/dL 8 6 8 4 9 7 8 7 9 1     Results from last 7 days Lab Units 03/04/23  0539 03/03/23  0456 03/02/23  1237 02/27/23  1534 02/26/23  1819   AST U/L 23 25 25 36 30   ALT U/L 36 34 42 38 32   ALK PHOS U/L 82 87 97 75 90   TOTAL PROTEIN g/dL 6 0* 6 0* 7 8 6 4 7 2   ALBUMIN g/dL 3 5 3 6 4 7 4 0 4 7   TOTAL BILIRUBIN mg/dL 0 49 0 65 0 92 0 54 0 45         Results from last 7 days   Lab Units 03/04/23  0539 03/03/23  0456 03/02/23  1237 02/27/23  1534 02/26/23  1819   GLUCOSE RANDOM mg/dL 83 87 87 79 97       Results from last 7 days   Lab Units 03/02/23  1237   PROCALCITONIN ng/ml 0 09     Results from last 7 days   Lab Units 03/03/23  0456 03/02/23  1237   LACTIC ACID mmol/L 1 4 1 7       Results from last 7 days   Lab Units 03/02/23  1237 02/27/23  1534 02/26/23  1819   LIPASE u/L 21 40 44       Results from last 7 days   Lab Units 03/02/23  1505   CLARITY UA  Slightly Cloudy   COLOR UA  Yellow   SPEC GRAV UA  >=1 030   PH UA  6 0   GLUCOSE UA mg/dl Negative   KETONES UA mg/dl Negative   BLOOD UA  Negative   PROTEIN UA mg/dl Trace*   NITRITE UA  Negative   BILIRUBIN UA  Small*   UROBILINOGEN UA E U /dl 0 2   LEUKOCYTES UA  Negative   WBC UA /hpf 1-2   RBC UA /hpf 0-1   BACTERIA UA /hpf Occasional   EPITHELIAL CELLS WET PREP /hpf Occasional   MUCUS THREADS  Innumerable*       Results from last 7 days   Lab Units 03/03/23  0458   SALMONELLA SP PCR  None Detected   SHIGELLA SP/ENTEROINVASIVE E  COLI (EIEC)  None Detected   CAMPYLOBACTER SP (JEJUNI AND COLI)  None Detected   SHIGA TOXIN 1/SHIGA TOXIN 2  None Detected         Results from last 7 days   Lab Units 03/02/23  2310   BLOOD CULTURE  No Growth at 24 hrs  No Growth at 24 hrs         Scheduled Medications:    cholestyramine sugar free, 4 g, Oral, BID  DULoxetine, 60 mg, Oral, Daily  enoxaparin, 40 mg, Subcutaneous, Daily      Continuous IV Infusions:  sodium chloride, 125 mL/hr, Intravenous, Continuous      PRN Meds:  acetaminophen, 650 mg, Oral, Q6H PRN  ALPRAZolam, 0 5 mg, Oral, TID PRN  HYDROmorphone, 0 5 mg, Intravenous, Q3H PRN  ondansetron, 4 mg, Intravenous, Q6H PRN        Discharge Plan: to be determined     Network Utilization Review Department  ATTENTION: Please call with any questions or concerns to [phone] and carefully listen to the prompts so that you are directed to the right person  All voicemails are confidential   Josué Oar all requests for admission clinical reviews, approved or denied determinations and any other requests to dedicated fax number below belonging to the campus where the patient is receiving treatment   List of dedicated fax numbers for the Facilities:  [address] DENIALS (Administrative/Medical Necessity) [phone]   [address] (Maternity/NICU/Pediatrics) [phone]   6 Ashtyn Perez [phone]   [facility]  [phone]   [facility] [place] [address] Carmine DelacruzHenry [facility]. [facility] 28 [phone]   1553 [facility] Leonora SaavedraFirst[facility] 134 5 [facility] [phone]

## 2023-03-04 NOTE — ASSESSMENT & PLAN NOTE
Recurrent,    High output ostomy per patient report  · I's and O's completed  · Patient to identify triggers and start journaling on discharge for outpatient high output into ostomy  · Managed by specialist at [facility]  · GI consulted: ISABELLA low fiber high residue per GI recs  · Continue Questran prescribed on discharge

## 2023-03-04 NOTE — ASSESSMENT & PLAN NOTE
Evident with leukocytosis, tachycardia, tachypnea, possible GI source    · Blood cultures- NGTD 72 H  · Stool cultures- negative for Campylobacter, Salmonella, Shigella,  · Lactic acid-negative /Pro-Rubin- WNL  · No imaging on admission, consider CT if symptoms worsen  · Empiric Levaquin and metronidazole    DC due to no signs of infection  · GI consulted: ADAT as listed, follow-up OP with [facility]

## 2023-03-04 NOTE — ASSESSMENT & PLAN NOTE
Recurrent, currently resolved    History of UC with multiple extensive abdominal surgeries, creation of a J-pouch and ileostomy  • Follows with specialist at [facility], reports surgery planned for April for revision  · No imaging on this admission, will obtain if pain repeats  · 2/26/2023: CT abdomen: Post colectomy  J-pouch is seen extending from the right lower quadrant to the rectum with similar appearance of mural thickening comparison to the prior exams   No other potential acute pathology visualized on CT of the abdomen and pelvis with IV with oral contrast

## 2023-03-04 NOTE — ASSESSMENT & PLAN NOTE
From dehydration per patient report PTA    · Echo- 2 months ago  · Orthostatics- unremarkable  · Telemetry completed  · Cardiac : Orthostatic negative and echo 2 months ago WNL    No need to repeat echocardiogram, stay hydrated, consider compression socks

## 2023-03-04 NOTE — PLAN OF CARE
Problem: PAIN - ADULT  Goal: Verbalizes/displays adequate comfort level or baseline comfort level  Description: Interventions:  - Encourage patient to monitor pain and request assistance  - Assess pain using appropriate pain scale  - Administer analgesics based on type and severity of pain and evaluate response  - Implement non-pharmacological measures as appropriate and evaluate response  - Consider cultural and social influences on pain and pain management  - Notify physician/advanced practitioner if interventions unsuccessful or patient reports new pain  Outcome: Progressing     Problem: INFECTION - ADULT  Goal: Absence or prevention of progression during hospitalization  Description: INTERVENTIONS:  - Assess and monitor for signs and symptoms of infection  - Monitor lab/diagnostic results  - Monitor all insertion sites, i e  indwelling lines, tubes, and drains  - Monitor endotracheal if appropriate and nasal secretions for changes in amount and color  - [place] appropriate cooling/warming therapies per order  - Administer medications as ordered  - Instruct and encourage patient and family to use good hand hygiene technique  - Identify and instruct in appropriate isolation precautions for identified infection/condition  Outcome: Progressing  Goal: Absence of fever/infection during neutropenic period  Description: INTERVENTIONS:  - Monitor WBC    Outcome: Progressing     Problem: DISCHARGE PLANNING  Goal: Discharge to home or other facility with appropriate resources  Description: INTERVENTIONS:  - Identify barriers to discharge w/patient and caregiver  - Arrange for needed discharge resources and transportation as appropriate  - Identify discharge learning needs (meds, wound care, etc )  - Arrange for interpretive services to assist at discharge as needed  - Refer to Case Management Department for coordinating discharge planning if the patient needs post-hospital services based on physician/advanced practitioner order or complex needs related to functional status, cognitive ability, or social support system  Outcome: Progressing     Problem: Knowledge Deficit  Goal: Patient/family/caregiver demonstrates understanding of disease process, treatment plan, medications, and discharge instructions  Description: Complete learning assessment and assess knowledge base    Interventions:  - Provide teaching at level of understanding  - Provide teaching via preferred learning methods  Outcome: Progressing     Problem: GASTROINTESTINAL - ADULT  Goal: Minimal or absence of nausea and/or vomiting  Description: INTERVENTIONS:  - Administer IV fluids if ordered to ensure adequate hydration  - Maintain NPO status until nausea and vomiting are resolved  - Nasogastric tube if ordered  - Administer ordered antiemetic medications as needed  - Provide nonpharmacologic comfort measures as appropriate  - Advance diet as tolerated, if ordered  - Consider nutrition services referral to assist patient with adequate nutrition and appropriate food choices  Outcome: Progressing  Goal: Maintains or returns to baseline bowel function  Description: INTERVENTIONS:  - Assess bowel function  - Encourage oral fluids to ensure adequate hydration  - Administer IV fluids if ordered to ensure adequate hydration  - Administer ordered medications as needed  - Encourage mobilization and activity  - Consider nutritional services referral to assist patient with adequate nutrition and appropriate food choices  Outcome: Progressing

## 2023-03-04 NOTE — PROGRESS NOTES
[facility]  Progress Note - Christiana Joe 1995, 32 y o  male MRN: [id number]  Unit/Bed#: [address] [place] [address] Encounter: [id number]  Primary Care Provider: Natacha Koenig DO   Date and time admitted to hospital: 3/2/2023 12:08 PM  I  Dehydration-resolved as of 3/4/2023  Assessment & Plan  Per patient report    · BUN-normal  · IVF  · I's and O's  · Urine retention protocol in place    Sepsis [facility])  Assessment & Plan  Evident with leukocytosis, tachycardia, tachypnea, possible GI source    · Blood cultures- NGTD 24  · Stool cultures- negative for Campylobacter, Salmonella, Shigella,  · Lactic acid-negative trend for a m , Pro-Rubin-pending  · No imaging on admission, consider CT if symptoms worsen  · Empiric Levaquin and metronidazole, will continue due to Tmax 100 8 last night  · GI consulted: BRODY SALGADO as listed, follow-up OP with [facility]    Ileostomy present [facility])  Assessment & Plan  Recurrent,    High output ostomy per patient report  · I's and O's almost 3 L  · Continue I's and O's in hat and toilet  · Managed by specialist at [facility]  · GI consulted: ISABELLA low fiber high residue per GI recs  · Continue Questran      Syncope-resolved as of 3/4/2023  Assessment & Plan  From dehydration per patient report PTA    · Echo- 2 months ago  · Orthostatics- unremarkable  · Telemetry completed  · Cardiac : Orthostatic negative and echo 2 months ago WNL  No need to repeat echocardiogram, stay hydrated, consider compression socks        History of ulcerative colitis  Assessment & Plan  A/C, GI consulted recs as below    · Patient has history of ulcerative colitis, follows [facility] and outpatient GI      Abdominal pain  Assessment & Plan  Recurrent, currently asymptomatic per patient report    History of UC with multiple extensive abdominal surgeries, creation of a J-pouch and ileostomy  • Follows with specialist at [facility], reports that he does have surgery planned for April for revision    · No imaging on this admission, will obtain if pain repeats  · 2/26/2023: CT abdomen: Post colectomy  J-pouch is seen extending from the right lower quadrant to the rectum with similar appearance of mural thickening comparison to the prior exams  No other potential acute pathology visualized on CT of the abdomen and pelvis with IV with oral contrast     Chronic pain syndrome  Assessment & Plan  Unresolved,     · monitor pain meds to avoid OIH  ·  pt on opoid med complications and tolerance  · Patient not a candidate for OP Chronic pain management due to type of pain not on MSK/spinal in nature  · - reviewed  · Continue home duloxetine, 1 dose of Toradol in ED, and Dilaudid, Tylenol as needed, as needed Dilaudid  ·         VTE Pharmacologic Prophylaxis:   Moderate Risk (Score 3-4) - Pharmacological DVT Prophylaxis Ordered: enoxaparin (Lovenox)  Patient Centered Rounds: I performed bedside rounds with nursing staff today  Discussions with Specialists or Other Care Team Provider: Cardiology and GI    Education and Discussions with Family / Patient: Patient declined call to   Total Time Spent on Date of Encounter in care of patient: 45 minutes This time was spent on one or more of the following: performing physical exam; counseling and coordination of care; obtaining or reviewing history; documenting in the medical record; reviewing/ordering tests, medications or procedures; communicating with other healthcare professionals and discussing with patient's family/caregivers  Current Length of Stay: 1 day(s)  Current Patient Status: Inpatient   Certification Statement: The patient will continue to require additional inpatient hospital stay due to Clinical course  Discharge Plan: Anticipate discharge tomorrow to home  Code Status: Level 1 - Full Code    Subjective:   Patient seen and examined at bedside,, patient reported improvement in symptoms, taken a m   Questran and having almost 3 L output since yesterday which is improved  Patient had concerns regarding discharge because when he is home every time he drinks or eats his ostomy bag fills up within 5 minutes  Patient informed likely slowing down due to IV pain meds  Patient reported trying to identify triggers like MD encouraged and unable to do so at this time  Patient reported eating bagel butter this a m  Patient denied any chest pain, shortness of breath, worsening abdominal pain  Patient reported that he wanted to be discharged because of his brothers engagement party today, but started to have symptoms again and wants to stay  Objective:     Vitals:   Temp (24hrs), Av 8 °F (37 7 °C), Min:98 8 °F (37 1 °C), Max:100 8 °F (38 2 °C)    Temp:  [98 8 °F (37 1 °C)-100 8 °F (38 2 °C)] 98 8 °F (37 1 °C)  HR:  [63-77] 63  Resp:  [18-19] 19  BP: (108-115)/(55-66) 110/59  SpO2:  [95 %-97 %] 97 %  Body mass index is 25 26 kg/m²  Input and Output Summary (last 24 hours): Intake/Output Summary (Last 24 hours) at 3/4/2023 1506  Last data filed at 3/4/2023 1034  Gross per 24 hour   Intake 1760 ml   Output 2750 ml   Net -990 ml       Physical Exam:   Physical Exam  Vitals and nursing note reviewed  Constitutional:       General: He is not in acute distress  Appearance: He is well-developed  HENT:      Head: Normocephalic and atraumatic  Eyes:      Conjunctiva/sclera: Conjunctivae normal    Cardiovascular:      Rate and Rhythm: Normal rate and regular rhythm  Heart sounds: No murmur heard  Pulmonary:      Effort: Pulmonary effort is normal  No respiratory distress  Breath sounds: Normal breath sounds  No wheezing or rales  Abdominal:      Palpations: Abdomen is soft  Tenderness: There is no abdominal tenderness  There is no guarding or rebound  Comments: Right-sided ostomy bag   Musculoskeletal:         General: No swelling  Cervical back: Neck supple  Right lower leg: No edema  Left lower leg: No edema  Skin:     General: Skin is warm and dry  Capillary Refill: Capillary refill takes less than 2 seconds  Neurological:      Mental Status: He is alert and oriented to person, place, and time  Psychiatric:         Mood and Affect: Mood normal          Behavior: Behavior normal          Thought Content: Thought content normal           Additional Data:     Labs:  Results from last 7 days   Lab Units 03/04/23  0539 03/03/23  0456 03/02/23  1237   WBC Thousand/uL 4 95   < > 11 73*   HEMOGLOBIN g/dL 9 4*   < > 13 0   HEMATOCRIT % 31 9*   < > 44 3   PLATELETS Thousands/uL 334   < > 575*   NEUTROS PCT %  --   --  71   LYMPHS PCT %  --   --  16   MONOS PCT %  --   --  9   EOS PCT %  --   --  3    < > = values in this interval not displayed  Results from last 7 days   Lab Units 03/04/23  0539   SODIUM mmol/L 137   POTASSIUM mmol/L 3 6   CHLORIDE mmol/L 106   CO2 mmol/L 28   BUN mg/dL 4*   CREATININE mg/dL 0 74   ANION GAP mmol/L 3*   CALCIUM mg/dL 8 6   ALBUMIN g/dL 3 5   TOTAL BILIRUBIN mg/dL 0 49   ALK PHOS U/L 82   ALT U/L 36   AST U/L 23   GLUCOSE RANDOM mg/dL 83                 Results from last 7 days   Lab Units 03/03/23  0456 03/02/23  1237   LACTIC ACID mmol/L 1 4 1 7   PROCALCITONIN ng/ml  --  0 09       Lines/Drains:  Invasive Devices     Peripheral Intravenous Line  Duration           Peripheral IV 03/02/23 Left Antecubital 2 days          Drain  Duration           Ileostomy Continent (Abdominal pouch)  days                      Imaging: No pertinent imaging reviewed  Recent Cultures (last 7 days):   Results from last 7 days   Lab Units 03/02/23  2310   BLOOD CULTURE  No Growth at 24 hrs  No Growth at 24 hrs         Last 24 Hours Medication List:   Current Facility-Administered Medications   Medication Dose Route Frequency Provider Last Rate   • acetaminophen  650 mg Oral Q6H PRN Ramon Newton MD     • ALPRAZolam  0 5 mg Oral TID PRN Ramon Newton MD     • cholestyramine sugar free  4 g Oral BID Liza Shah MD     • DULoxetine  60 mg Oral Daily Liza Shah MD     • enoxaparin  40 mg Subcutaneous Daily Liza Shah MD     • HYDROmorphone  0 5 mg Intravenous Q3H PRN Nain Reynoso PA-C     • ondansetron  4 mg Intravenous Q6H PRN Liza Shah MD     • sodium chloride  125 mL/hr Intravenous Continuous Liza Shah  mL/hr (03/04/23 0749)        Today, Patient Was Seen By: Liza Shah MD    **Please Note: This note may have been constructed using a voice recognition system  **

## 2023-03-04 NOTE — ASSESSMENT & PLAN NOTE
A/C, GI consulted recs as below    · Patient has history of ulcerative colitis, follows [facility] and outpatient GI

## 2023-03-05 LAB
ALBUMIN SERPL BCP-MCNC: 3.7 G/DL (ref 3.5–5)
ALP SERPL-CCNC: 86 U/L (ref 34–104)
ALT SERPL W P-5'-P-CCNC: 35 U/L (ref 7–52)
ANION GAP SERPL CALCULATED.3IONS-SCNC: 5 MMOL/L (ref 4–13)
AST SERPL W P-5'-P-CCNC: 23 U/L (ref 13–39)
BILIRUB SERPL-MCNC: 0.37 MG/DL (ref 0.2–1)
BUN SERPL-MCNC: 4 MG/DL (ref 5–25)
CALCIUM SERPL-MCNC: 8.8 MG/DL (ref 8.4–10.2)
CHLORIDE SERPL-SCNC: 107 MMOL/L (ref 96–108)
CO2 SERPL-SCNC: 27 MMOL/L (ref 21–32)
CREAT SERPL-MCNC: 0.76 MG/DL (ref 0.6–1.3)
ERYTHROCYTE [DISTWIDTH] IN BLOOD BY AUTOMATED COUNT: 16.6 % (ref 11.6–15.1)
GFR SERPL CREATININE-BSD FRML MDRD: 123 ML/MIN/1.73SQ M
GLUCOSE SERPL-MCNC: 84 MG/DL (ref 65–140)
HCT VFR BLD AUTO: 34.2 % (ref 36.5–49.3)
HGB BLD-MCNC: 10 G/DL (ref 12–17)
MCH RBC QN AUTO: 18.8 PG (ref 26.8–34.3)
MCHC RBC AUTO-ENTMCNC: 29.2 G/DL (ref 31.4–37.4)
MCV RBC AUTO: 64 FL (ref 82–98)
PLATELET # BLD AUTO: 377 THOUSANDS/UL (ref 149–390)
PMV BLD AUTO: 8.7 FL (ref 8.9–12.7)
POTASSIUM SERPL-SCNC: 3.7 MMOL/L (ref 3.5–5.3)
PROT SERPL-MCNC: 6 G/DL (ref 6.4–8.4)
RBC # BLD AUTO: 5.33 MILLION/UL (ref 3.88–5.62)
SODIUM SERPL-SCNC: 139 MMOL/L (ref 135–147)
WBC # BLD AUTO: 6.55 THOUSAND/UL (ref 4.31–10.16)

## 2023-03-05 RX ADMIN — HYDROMORPHONE HYDROCHLORIDE 0.5 MG: 1 INJECTION, SOLUTION INTRAMUSCULAR; INTRAVENOUS; SUBCUTANEOUS at 11:42

## 2023-03-05 RX ADMIN — HYDROMORPHONE HYDROCHLORIDE 0.5 MG: 1 INJECTION, SOLUTION INTRAMUSCULAR; INTRAVENOUS; SUBCUTANEOUS at 19:53

## 2023-03-05 RX ADMIN — HYDROMORPHONE HYDROCHLORIDE 0.5 MG: 1 INJECTION, SOLUTION INTRAMUSCULAR; INTRAVENOUS; SUBCUTANEOUS at 23:36

## 2023-03-05 RX ADMIN — HYDROMORPHONE HYDROCHLORIDE 0.5 MG: 1 INJECTION, SOLUTION INTRAMUSCULAR; INTRAVENOUS; SUBCUTANEOUS at 04:52

## 2023-03-05 RX ADMIN — HYDROMORPHONE HYDROCHLORIDE 0.5 MG: 1 INJECTION, SOLUTION INTRAMUSCULAR; INTRAVENOUS; SUBCUTANEOUS at 08:31

## 2023-03-05 RX ADMIN — SODIUM CHLORIDE 125 ML/HR: 0.9 INJECTION, SOLUTION INTRAVENOUS at 16:44

## 2023-03-05 RX ADMIN — ALPRAZOLAM 0.5 MG: 0.5 TABLET ORAL at 13:09

## 2023-03-05 RX ADMIN — DULOXETINE HYDROCHLORIDE 60 MG: 60 CAPSULE, DELAYED RELEASE ORAL at 08:31

## 2023-03-05 RX ADMIN — HYDROMORPHONE HYDROCHLORIDE 0.5 MG: 1 INJECTION, SOLUTION INTRAMUSCULAR; INTRAVENOUS; SUBCUTANEOUS at 16:28

## 2023-03-05 RX ADMIN — SODIUM CHLORIDE 125 ML/HR: 0.9 INJECTION, SOLUTION INTRAVENOUS at 05:01

## 2023-03-05 RX ADMIN — HYDROMORPHONE HYDROCHLORIDE 0.5 MG: 1 INJECTION, SOLUTION INTRAMUSCULAR; INTRAVENOUS; SUBCUTANEOUS at 01:41

## 2023-03-05 RX ADMIN — CHOLESTYRAMINE 4 G: 4 POWDER, FOR SUSPENSION ORAL at 08:31

## 2023-03-05 RX ADMIN — ENOXAPARIN SODIUM 40 MG: 40 INJECTION SUBCUTANEOUS at 08:31

## 2023-03-05 RX ADMIN — CHOLESTYRAMINE 4 G: 4 POWDER, FOR SUSPENSION ORAL at 17:33

## 2023-03-05 NOTE — PLAN OF CARE
Problem: PAIN - ADULT  Goal: Verbalizes/displays adequate comfort level or baseline comfort level  Description: Interventions:  - Encourage patient to monitor pain and request assistance  - Assess pain using appropriate pain scale  - Administer analgesics based on type and severity of pain and evaluate response  - Implement non-pharmacological measures as appropriate and evaluate response  - Consider cultural and social influences on pain and pain management  - Notify physician/advanced practitioner if interventions unsuccessful or patient reports new pain  3/5/2023 0136 by Karl Venegas RN  Outcome: Progressing  3/5/2023 0135 by Karl Venegas RN  Outcome: Progressing     Problem: INFECTION - ADULT  Goal: Absence or prevention of progression during hospitalization  Description: INTERVENTIONS:  - Assess and monitor for signs and symptoms of infection  - Monitor lab/diagnostic results  - Monitor all insertion sites, i e  indwelling lines, tubes, and drains  - Monitor endotracheal if appropriate and nasal secretions for changes in amount and color  - [place] appropriate cooling/warming therapies per order  - Administer medications as ordered  - Instruct and encourage patient and family to use good hand hygiene technique  - Identify and instruct in appropriate isolation precautions for identified infection/condition  3/5/2023 0136 by Karl Venegas RN  Outcome: Progressing  3/5/2023 0135 by Karl Venegas RN  Outcome: Progressing     Problem: DISCHARGE PLANNING  Goal: Discharge to home or other facility with appropriate resources  Description: INTERVENTIONS:  - Identify barriers to discharge w/patient and caregiver  - Arrange for needed discharge resources and transportation as appropriate  - Identify discharge learning needs (meds, wound care, etc )  - Arrange for interpretive services to assist at discharge as needed  - Refer to Case Management Department for coordinating discharge planning if the patient needs post-hospital services based on physician/advanced practitioner order or complex needs related to functional status, cognitive ability, or social support system  3/5/2023 0136 by Dolores Boogie RN  Outcome: Progressing  3/5/2023 0135 by Dolores Boogie RN  Outcome: Progressing     Problem: Knowledge Deficit  Goal: Patient/family/caregiver demonstrates understanding of disease process, treatment plan, medications, and discharge instructions  Description: Complete learning assessment and assess knowledge base    Interventions:  - Provide teaching at level of understanding  - Provide teaching via preferred learning methods  3/5/2023 0136 by Dolores Boogie RN  Outcome: Progressing  3/5/2023 0135 by Dolores Boogie RN  Outcome: Progressing     Problem: GASTROINTESTINAL - ADULT  Goal: Minimal or absence of nausea and/or vomiting  Description: INTERVENTIONS:  - Administer IV fluids if ordered to ensure adequate hydration  - Maintain NPO status until nausea and vomiting are resolved  - Nasogastric tube if ordered  - Administer ordered antiemetic medications as needed  - Provide nonpharmacologic comfort measures as appropriate  - Advance diet as tolerated, if ordered  - Consider nutrition services referral to assist patient with adequate nutrition and appropriate food choices  Outcome: Progressing  Goal: Establish and maintain optimal ostomy function  Description: INTERVENTIONS:  - Assess bowel function  - Encourage oral fluids to ensure adequate hydration  - Administer IV fluids if ordered to ensure adequate hydration   - Administer ordered medications as needed  - Encourage mobilization and activity  - Nutrition services referral to assist patient with appropriate food choices  - Assess stoma site  - Consider wound care consult   Outcome: Progressing

## 2023-03-05 NOTE — PLAN OF CARE
Problem: PAIN - ADULT  Goal: Verbalizes/displays adequate comfort level or baseline comfort level  Description: Interventions:  - Encourage patient to monitor pain and request assistance  - Assess pain using appropriate pain scale  - Administer analgesics based on type and severity of pain and evaluate response  - Implement non-pharmacological measures as appropriate and evaluate response  - Consider cultural and social influences on pain and pain management  - Notify physician/advanced practitioner if interventions unsuccessful or patient reports new pain  Outcome: Progressing     Problem: INFECTION - ADULT  Goal: Absence or prevention of progression during hospitalization  Description: INTERVENTIONS:  - Assess and monitor for signs and symptoms of infection  - Monitor lab/diagnostic results  - Monitor all insertion sites, i e  indwelling lines, tubes, and drains  - Monitor endotracheal if appropriate and nasal secretions for changes in amount and color  - [address] appropriate cooling/warming therapies per order  - Administer medications as ordered  - Instruct and encourage patient and family to use good hand hygiene technique  - Identify and instruct in appropriate isolation precautions for identified infection/condition  Outcome: Progressing     Problem: DISCHARGE PLANNING  Goal: Discharge to home or other facility with appropriate resources  Description: INTERVENTIONS:  - Identify barriers to discharge w/patient and caregiver  - Arrange for needed discharge resources and transportation as appropriate  - Identify discharge learning needs (meds, wound care, etc )  - Arrange for interpretive services to assist at discharge as needed  - Refer to Case Management Department for coordinating discharge planning if the patient needs post-hospital services based on physician/advanced practitioner order or complex needs related to functional status, cognitive ability, or social support system  Outcome: Progressing Problem: Knowledge Deficit  Goal: Patient/family/caregiver demonstrates understanding of disease process, treatment plan, medications, and discharge instructions  Description: Complete learning assessment and assess knowledge base    Interventions:  - Provide teaching at level of understanding  - Provide teaching via preferred learning methods  Outcome: Progressing     Problem: GASTROINTESTINAL - ADULT  Goal: Minimal or absence of nausea and/or vomiting  Description: INTERVENTIONS:  - Administer IV fluids if ordered to ensure adequate hydration  - Maintain NPO status until nausea and vomiting are resolved  - Nasogastric tube if ordered  - Administer ordered antiemetic medications as needed  - Provide nonpharmacologic comfort measures as appropriate  - Advance diet as tolerated, if ordered  - Consider nutrition services referral to assist patient with adequate nutrition and appropriate food choices  Outcome: Progressing  Goal: Establish and maintain optimal ostomy function  Description: INTERVENTIONS:  - Assess bowel function  - Encourage oral fluids to ensure adequate hydration  - Administer IV fluids if ordered to ensure adequate hydration   - Administer ordered medications as needed  - Encourage mobilization and activity  - Nutrition services referral to assist patient with appropriate food choices  - Assess stoma site  - Consider wound care consult   Outcome: Progressing

## 2023-03-05 NOTE — PROGRESS NOTES
[facility]  Progress Note - Ada Dar 1995, 32 y o  male MRN: [id number]  Unit/Bed#: [address] [place] [address] Encounter: [id number]  Primary Care Provider: Twin Thrasher DO   Date and time admitted to hospital: 3/2/2023 12:08 PM  I  Dehydration-resolved as of 3/4/2023  Assessment & Plan  Per patient report    · BUN-normal  · IVF  · I's and O's  · Urine retention protocol in place    Sepsis [facility])  Assessment & Plan  Evident with leukocytosis, tachycardia, tachypnea, possible GI source    · Blood cultures- NGTD 48 H  · Stool cultures- negative for Campylobacter, Salmonella, Shigella,  · Lactic acid-negative trend for a m , Pro-Rubin- WNL  · No imaging on admission, consider CT if symptoms worsen  · Empiric Levaquin and metronidazole  Due to no signs of infection  · GI consulted: BRODY SALGADO as listed, follow-up OP with [facility]    Ileostomy present [facility])  Assessment & Plan  Recurrent,    High output ostomy per patient report  · I's and O's almost 3 L  · Continue I's and O's in hat and toilet  · Managed by specialist at [facility]  · GI consulted: ISABELLA low fiber high residue per GI recs  · Continue Questran      Syncope-resolved as of 3/4/2023  Assessment & Plan  From dehydration per patient report PTA    · Echo- 2 months ago  · Orthostatics- unremarkable  · Telemetry completed  · Cardiac : Orthostatic negative and echo 2 months ago WNL  No need to repeat echocardiogram, stay hydrated, consider compression socks        History of ulcerative colitis  Assessment & Plan  A/C, GI consulted recs as below    · Patient has history of ulcerative colitis, follows [facility] and outpatient GI      Abdominal pain  Assessment & Plan  Recurrent, currently asymptomatic per patient report    History of UC with multiple extensive abdominal surgeries, creation of a J-pouch and ileostomy  • Follows with specialist at [facility], reports that he does have surgery planned for April for revision    · No imaging on this admission, will obtain if pain repeats  · 2/26/2023: CT abdomen: Post colectomy  J-pouch is seen extending from the right lower quadrant to the rectum with similar appearance of mural thickening comparison to the prior exams  No other potential acute pathology visualized on CT of the abdomen and pelvis with IV with oral contrast     Chronic pain syndrome  Assessment & Plan  Unresolved,     · monitor pain meds to avoid OIH  ·  pt on opoid med complications and tolerance  · Patient not a candidate for OP Chronic pain management due to type of pain not on MSK/spinal in nature  · - reviewed  · Continue home duloxetine, 1 dose of Toradol in ED, and Dilaudid, Tylenol as needed, as needed Dilaudid  · To contact [facility] Rodriguez, is giving a referral to their pain management program per patient report        VTE Pharmacologic Prophylaxis:   Moderate Risk (Score 3-4) - Pharmacological DVT Prophylaxis Ordered: enoxaparin (Lovenox)  Patient Centered Rounds: I performed bedside rounds with nursing staff today  Discussions with Specialists or Other Care Team Provider: Cardiology and GI    Education and Discussions with Family / Patient: Patient declined call to   Total Time Spent on Date of Encounter in care of patient: 45 minutes This time was spent on one or more of the following: performing physical exam; counseling and coordination of care; obtaining or reviewing history; documenting in the medical record; reviewing/ordering tests, medications or procedures; communicating with other healthcare professionals and discussing with patient's family/caregivers  Current Length of Stay: 2 day(s)  Current Patient Status: Inpatient   Certification Statement: The patient will continue to require additional inpatient hospital stay due to Clinical course  Discharge Plan: Anticipate discharge tomorrow to home      Code Status: Level 1 - Full Code    Subjective:   Patient seen and examined at bedside, reported a lot of output overnight  Reported pain 7/10, discussed goals of care regarding pain management and discharge, patient stated that Dr Carlos Alexander his [facility] GI specialist would be giving him a referral to their outpatient pain management center upon follow-up  Objective:     Vitals:   Temp (24hrs), Av 4 °F (36 9 °C), Min:98 2 °F (36 8 °C), Max:98 8 °F (37 1 °C)    Temp:  [98 2 °F (36 8 °C)-98 8 °F (37 1 °C)] 98 2 °F (36 8 °C)  HR:  [] 103  Resp:  [18-20] 18  BP: (114-143)/(67-90) 114/82  SpO2:  [95 %-97 %] 96 %  Body mass index is 25 26 kg/m²  Input and Output Summary (last 24 hours): Intake/Output Summary (Last 24 hours) at 3/5/2023 1542  Last data filed at 3/5/2023 1101  Gross per 24 hour   Intake 2597 5 ml   Output 2700 ml   Net -102 5 ml       Physical Exam:   Physical Exam  Vitals and nursing note reviewed  Constitutional:       General: He is not in acute distress  Appearance: He is well-developed  HENT:      Head: Normocephalic and atraumatic  Eyes:      Conjunctiva/sclera: Conjunctivae normal    Cardiovascular:      Rate and Rhythm: Normal rate and regular rhythm  Heart sounds: No murmur heard  No friction rub  Pulmonary:      Effort: Pulmonary effort is normal  No respiratory distress  Breath sounds: Normal breath sounds  No wheezing or rhonchi  Abdominal:      Palpations: Abdomen is soft  Tenderness: There is no abdominal tenderness  There is no guarding  Hernia: No hernia is present  Musculoskeletal:         General: No swelling  Cervical back: Neck supple  Skin:     General: Skin is warm and dry  Capillary Refill: Capillary refill takes less than 2 seconds  Neurological:      Mental Status: He is alert and oriented to person, place, and time     Psychiatric:         Mood and Affect: Mood normal          Behavior: Behavior normal           Additional Data:     Labs:  Results from last 7 days   Lab Units 23  6089 03/03/23  0456 03/02/23  1237   WBC Thousand/uL 6 55   < > 11 73*   HEMOGLOBIN g/dL 10 0*   < > 13 0   HEMATOCRIT % 34 2*   < > 44 3   PLATELETS Thousands/uL 377   < > 575*   NEUTROS PCT %  --   --  71   LYMPHS PCT %  --   --  16   MONOS PCT %  --   --  9   EOS PCT %  --   --  3    < > = values in this interval not displayed  Results from last 7 days   Lab Units 03/05/23  0450   SODIUM mmol/L 139   POTASSIUM mmol/L 3 7   CHLORIDE mmol/L 107   CO2 mmol/L 27   BUN mg/dL 4*   CREATININE mg/dL 0 76   ANION GAP mmol/L 5   CALCIUM mg/dL 8 8   ALBUMIN g/dL 3 7   TOTAL BILIRUBIN mg/dL 0 37   ALK PHOS U/L 86   ALT U/L 35   AST U/L 23   GLUCOSE RANDOM mg/dL 84                 Results from last 7 days   Lab Units 03/04/23  0539 03/03/23  0456 03/02/23  1237   LACTIC ACID mmol/L  --  1 4 1 7   PROCALCITONIN ng/ml 0 13  --  0 09       Lines/Drains:  Invasive Devices     Peripheral Intravenous Line  Duration           Peripheral IV 03/02/23 Left Antecubital 3 days          Drain  Duration           Ileostomy Continent (Abdominal pouch)  days                      Imaging: No pertinent imaging reviewed  Recent Cultures (last 7 days):   Results from last 7 days   Lab Units 03/02/23  2310   BLOOD CULTURE  No Growth at 48 hrs  No Growth at 48 hrs         Last 24 Hours Medication List:   Current Facility-Administered Medications   Medication Dose Route Frequency Provider Last Rate   • acetaminophen  650 mg Oral Q6H PRN Teddy Gil MD     • ALPRAZolam  0 5 mg Oral TID PRN Teddy Gil MD     • cholestyramine sugar free  4 g Oral BID Teddy Gil MD     • DULoxetine  60 mg Oral Daily Teddy Gil MD     • enoxaparin  40 mg Subcutaneous Daily Teddy Gil MD     • HYDROmorphone  0 5 mg Intravenous Q3H PRN Ghazala Hilliard PA-C     • ondansetron  4 mg Intravenous Q6H PRN Teddy Gil MD     • sodium chloride  125 mL/hr Intravenous Continuous Teddy Gil  mL/hr (03/05/23 0501)        Today, Patient Was Seen By: Bubba Seaman MD    **Please Note: This note may have been constructed using a voice recognition system  **

## 2023-03-06 ENCOUNTER — TELEPHONE (OUTPATIENT)
Dept: FAMILY MEDICINE CLINIC | Facility: CLINIC | Age: 28
End: 2023-03-06

## 2023-03-06 ENCOUNTER — TRANSITIONAL CARE MANAGEMENT (OUTPATIENT)
Dept: FAMILY MEDICINE CLINIC | Facility: CLINIC | Age: 28
End: 2023-03-06

## 2023-03-06 VITALS
OXYGEN SATURATION: 98 % | WEIGHT: 171.08 LBS | HEART RATE: 76 BPM | SYSTOLIC BLOOD PRESSURE: 110 MMHG | TEMPERATURE: 97.7 F | HEIGHT: 69 IN | BODY MASS INDEX: 25.34 KG/M2 | DIASTOLIC BLOOD PRESSURE: 68 MMHG | RESPIRATION RATE: 18 BRPM

## 2023-03-06 PROBLEM — R10.9 ABDOMINAL PAIN: Status: RESOLVED | Noted: 2018-09-13 | Resolved: 2023-03-06

## 2023-03-06 RX ORDER — CHOLESTYRAMINE LIGHT 4 G/5.7G
4 POWDER, FOR SUSPENSION ORAL 2 TIMES DAILY
Qty: 42 EACH | Refills: 0 | Status: SHIPPED | OUTPATIENT
Start: 2023-03-06 | End: 2023-03-15

## 2023-03-06 RX ADMIN — HYDROMORPHONE HYDROCHLORIDE 0.5 MG: 1 INJECTION, SOLUTION INTRAMUSCULAR; INTRAVENOUS; SUBCUTANEOUS at 11:15

## 2023-03-06 RX ADMIN — CHOLESTYRAMINE 4 G: 4 POWDER, FOR SUSPENSION ORAL at 08:08

## 2023-03-06 RX ADMIN — SODIUM CHLORIDE 125 ML/HR: 0.9 INJECTION, SOLUTION INTRAVENOUS at 00:33

## 2023-03-06 RX ADMIN — ENOXAPARIN SODIUM 40 MG: 40 INJECTION SUBCUTANEOUS at 08:07

## 2023-03-06 RX ADMIN — SODIUM CHLORIDE 125 ML/HR: 0.9 INJECTION, SOLUTION INTRAVENOUS at 07:37

## 2023-03-06 RX ADMIN — DULOXETINE HYDROCHLORIDE 60 MG: 60 CAPSULE, DELAYED RELEASE ORAL at 08:08

## 2023-03-06 RX ADMIN — HYDROMORPHONE HYDROCHLORIDE 0.5 MG: 1 INJECTION, SOLUTION INTRAMUSCULAR; INTRAVENOUS; SUBCUTANEOUS at 03:03

## 2023-03-06 RX ADMIN — HYDROMORPHONE HYDROCHLORIDE 0.5 MG: 1 INJECTION, SOLUTION INTRAMUSCULAR; INTRAVENOUS; SUBCUTANEOUS at 07:32

## 2023-03-06 NOTE — UTILIZATION REVIEW
Initial Clinical Review    Observation 3/2/23 @ 24-20-52-61 converted to inpatient admission 3/3/23 @ 2007 for continued care & tx for sepsis, abd pain  Admission: Date/Time/Statement:   Admission Orders (From admission, onward)     Ordered        03/03/23 2007  Inpatient Admission  Once            03/02/23 1554  Place in Observation  Once                      Orders Placed This Encounter   Procedures   • Inpatient Admission     Standing Status:   Standing     Number of Occurrences:   1     Order Specific Question:   Level of Care     Answer:   Med Surg [16]     Order Specific Question:   Estimated length of stay     Answer:   More than 2 Midnights     Order Specific Question:   Certification     Answer:   I certify that inpatient services are medically necessary for this patient for a duration of greater than two midnights  See H&P and MD Progress Notes for additional information about the patient's course of treatment  ED Arrival Information     Expected   -    Arrival   3/2/2023 12:07    Acuity   Emergent            Means of arrival   Ambulance    Escorted by   [facility]    Admission type   Emergency            Arrival complaint   -           Chief Complaint   Patient presents with   • Abdominal Pain     Patient C/O RLQ abdominal pain with increased ileostomy output "I've dumped around 6 L of fluid last night  I feel dizzy and like I am going to pass out from the pain "        Initial Presentation:   32 yom to ER from home via EMS c/o increased output from the ileostomy  He states he had about 8 L since yesterday with associated N&V, felt lightheaded & passed out  Hx ulcerative colitis, status post total colectomy with ileal pouch formation  Presents tachycardic, tachypneic, generalized abdominal tenderness  Ileostomy site is clean and dry  Bag has been recently changed  Admission work-up showing leukocytosis  Placed in observation status for dehydration, sepsis   Cultures pending  Observation to IP admission 3/3/23    Continues to require multiple doses IV Dilaudid for abd pain mgt  Remains NPO with IVF maintained  IVABT in progress, IVF maintained  GI & cardio consulted  Per GI: abd pain   Patient reports that output has decreased with enteric panel for stool being negative again  Can advance diet as tolerated as patient reports that he did tolerate food that was brought into him    Per cardio:   1  Syncope/near syncope secondary to dehydration  2  Dehydration with high ileoconduit output  3  History of frequent intra-abdominal infections  4  Ulcerative colitis  Continue IVF & IVABT  Orthostatic vital signs are negative and echocardiogram from 2 months ago was within normal limits  No need to repeat echocardiogram  Encourage patient to stay well-hydrated especially when he has high ileal conduit output  May consider using compression stockings during the waking hours      ED Triage Vitals [03/02/23 1227]   Temperature Pulse Respirations Blood Pressure SpO2   97 6 °F (36 4 °C) (!) 160 (!) 26 136/89 97 %      Temp Source Heart Rate Source Patient Position - Orthostatic VS BP Location FiO2 (%)   Temporal Monitor Lying Right arm --      Pain Score       10 - Worst Possible Pain          Wt Readings from Last 1 Encounters:   03/02/23 77 6 kg (171 lb 1 2 oz)     Additional Vital Signs:   03/02/23 2317 97 6 °F (36 4 °C) 97 18 123/81 96 98 % None (Room air) Lying   03/02/23 2000 -- -- -- 115/73 90 -- -- Sitting - Orthostatic VS   03/02/23 1957 -- -- -- 135/90 108 -- -- Standing - Orthostatic VS   03/02/23 1900 98 3 °F (36 8 °C) 88 18 125/78 96 98 % None (Room air) Lying - Orthostatic VS   03/02/23 1755 98 °F (36 7 °C) 88 18 127/75 -- 99 % None (Room air) Lying   03/02/23 1746 -- 101 20 128/73 -- 96 % None (Room air) Lying   03/02/23 1600 -- 84 14 -- -- -- -- --   03/02/23 1545 -- 92 16 114/66 86 -- -- --   03/02/23 1538 97 8 °F (36 6 °C) 84 16 114/66 -- 95 % None (Room air) Lying 03/02/23 1530 -- 84 15 -- -- -- -- --   03/02/23 1300 -- 120 Abnormal  24 Abnormal  -- -- 92 % -- --   03/02/23 1242 -- -- -- -- -- -- None (Room air) --   03/02/23 1227 97 6 °F (36 4 °C) 160 Abnormal  26 Abnormal  136/89 -- 97 % None (Room air) Lying     Pertinent Labs/Diagnostic Test Results:   3/2 Ekg=  Rhythm: sinus tachycardia     Ectopy:     Ectopy: none     QRS:     QRS axis:  Normal     QRS intervals:  Normal   Conduction:     Conduction: normal     ST segments:     ST segments:  Normal   T waves:     T waves: normal     Results from last 7 days   Lab Units 03/05/23 0450 03/04/23 0539 03/03/23 0456 03/02/23 1237 02/27/23  1534   WBC Thousand/uL 6 55 4 95 5 95 11 73* 8 22   HEMOGLOBIN g/dL 10 0* 9 4* 9 9* 13 0 10 2*   HEMATOCRIT % 34 2* 31 9* 33 3* 44 3 34 1*   PLATELETS Thousands/uL 377 334 352 575* 412*   NEUTROS ABS Thousands/µL  --   --   --  8 29* 5 51     Results from last 7 days   Lab Units 03/05/23 0450 03/04/23 0539 03/03/23 0456 03/02/23 1237 02/27/23  1534   SODIUM mmol/L 139 137 136 136 138   POTASSIUM mmol/L 3 7 3 6 3 7 3 7 4 1   CHLORIDE mmol/L 107 106 105 101 106   CO2 mmol/L 27 28 25 24 26   ANION GAP mmol/L 5 3* 6 11 6   BUN mg/dL 4* 4* 8 14 11   CREATININE mg/dL 0 76 0 74 0 79 1 07 0 92   EGFR ml/min/1 73sq m 123 124 121 93 111   CALCIUM mg/dL 8 8 8 6 8 4 9 7 8 7     Results from last 7 days   Lab Units 03/05/23 0450 03/04/23 0539 03/03/23 0456 03/02/23 1237 02/27/23  1534   AST U/L 23 23 25 25 36   ALT U/L 35 36 34 42 38   ALK PHOS U/L 86 82 87 97 75   TOTAL PROTEIN g/dL 6 0* 6 0* 6 0* 7 8 6 4   ALBUMIN g/dL 3 7 3 5 3 6 4 7 4 0   TOTAL BILIRUBIN mg/dL 0 37 0 49 0 65 0 92 0 54     Results from last 7 days   Lab Units 03/05/23  0450 03/04/23  0539 03/03/23  0456 03/02/23  1237 02/27/23  1534   GLUCOSE RANDOM mg/dL 84 83 87 87 79     Results from last 7 days   Lab Units 03/04/23  0539 03/02/23  1237   PROCALCITONIN ng/ml 0 13 0 09     Results from last 7 days   Lab Units 03/03/23  0456 03/02/23  1237   LACTIC ACID mmol/L 1 4 1 7     Results from last 7 days   Lab Units 03/02/23  1237 02/27/23  1534   LIPASE u/L 21 40     Results from last 7 days   Lab Units 03/02/23  1505   CLARITY UA  Slightly Cloudy   COLOR UA  Yellow   SPEC GRAV UA  >=1 030   PH UA  6 0   GLUCOSE UA mg/dl Negative   KETONES UA mg/dl Negative   BLOOD UA  Negative   PROTEIN UA mg/dl Trace*   NITRITE UA  Negative   BILIRUBIN UA  Small*   UROBILINOGEN UA E U /dl 0 2   LEUKOCYTES UA  Negative   WBC UA /hpf 1-2   RBC UA /hpf 0-1   BACTERIA UA /hpf Occasional   EPITHELIAL CELLS WET PREP /hpf Occasional   MUCUS THREADS  Innumerable*     Results from last 7 days   Lab Units 03/03/23  0458   SALMONELLA SP PCR  None Detected   SHIGELLA SP/ENTEROINVASIVE E  COLI (EIEC)  None Detected   CAMPYLOBACTER SP (JEJUNI AND COLI)  None Detected   SHIGA TOXIN 1/SHIGA TOXIN 2  None Detected     Results from last 7 days   Lab Units 03/02/23  2310   BLOOD CULTURE  No Growth at 48 hrs  No Growth at 48 hrs       ED Treatment:   Medication Administration from 03/02/2023 1207 to 03/02/2023 1803       Date/Time Order Dose Route Action     03/02/2023 1233 EST sodium chloride 0 9 % bolus 1,000 mL 1,000 mL Intravenous New Bag     03/02/2023 1228 EST HYDROmorphone (DILAUDID) injection 2 mg 2 mg Intravenous Given     03/02/2023 1240 EST sodium chloride 0 9 % bolus 1,000 mL 1,000 mL Intravenous New Bag     03/02/2023 1410 EST HYDROmorphone (DILAUDID) injection 2 mg 2 mg Intravenous Given     03/02/2023 1534 EST sodium chloride 0 9 % bolus 1,000 mL 1,000 mL Intravenous New Bag     03/02/2023 1743 EST ondansetron (ZOFRAN) injection 4 mg 4 mg Intravenous Given     03/02/2023 1745 EST HYDROmorphone (DILAUDID) injection 1 mg 1 mg Intravenous Given        Past Medical History:   Diagnosis Date   • [facility]yl[facility] spondyl[facility] ([facility] )    • Anxiety    • Bowel obstruction (H[facility])    • Clostridium difficile colitis 9/13/2018   • Colitis    • 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[facility] ) 9/13/2018   • Pancreatitis    • Ulcerative colitis ([facility] )      Present on Admission:  • Abdominal pain  • Sepsis ([facility] )  • Chronic pain syndrome      Admitting Diagnosis: Dehydration [E86 0]  Ul[facility]ative [facility] ([facility] ) [K51 90]  Abdominal pain [R10 9]  Age/Sex: 32 y o  male  Admission Orders:  Pt/ot eval & tx  Scd/foot pumps  Consult GI  Orthostatic BP  Telemetry  Consult cardio    Scheduled Medications:  cholestyramine sugar free, 4 g, Oral, BID  DULoxetine, 60 mg, Oral, Daily  enoxaparin, 40 mg, Subcutaneous, Daily  iron sucrose (VENOFER) 200 mg in sodium chloride 0 9 % 100 mL IVPB, Once 3/3  levofloxacin (LEVAQUIN) IVPB 750 mg 150 mL, Daily  metroNIDAZOLE (FLAGYL) IVPB 500 mg 100 mL, Daily    Continuous IV Infusions:  sodium chloride, 125 mL/hr, Intravenous, Continuous    PRN Meds:  acetaminophen, 650 mg, Oral, Q6H PRN  ALPRAZolam, 0 5 mg, Oral, TID PRN  HYDROmorphone, 0 5 mg, Intravenous, Q3H PRN, 3/2 x1, 3/3 x4  ondansetron, 4 mg, Intravenous, Q6H PRN    Network Utilization Review Department  ATTENTION: Please call with any questions or concerns to [phone] and carefully listen to the prompts so that you are directed to the right person  All voicemails are confidential   Madelon Alexandra all requests for admission clinical reviews, approved or denied determinations and any other requests to dedicated fax number below belonging to the campus where the patient is receiving treatment   List of dedicated fax numbers for the Facilities:  [address] DENIALS (Administrative/Medical Necessity) [phone]   [address] (Maternity/NICU/Pediatrics) [phone]   [address] [phone]   [facility] [phone]   [facility] [phone]   [address] [place] [address] [place] [address] Cherelle ace [phone]   187 [facility] [address] 815 [facility] [phone]           Continued Stay Review    Date:  3-4-23                      Current Patient Class: inpatient  Current Level of Care: med surg    HPI:27 y o  male initially admitted on 3-2-23     Assessment/Plan:     Continue IV fluids 125/hr for dehydration  High output from ileostomy  Temp max last evening 100 8  Pt refuses Questran  Continue to monitor intake/output  Monitor for urinary retention  Follow up blood and stool cultures  Discontinue empiric iv levaquin and flagyl  Due to syncope  monitor on telemetry, obtain orthostatic vital signs and echo    Patient received iv dilaudid for pain 0038, 0402, 0755, 1216     Addendum :  Stool bacterial panel normal       Vital Signs:     Date/Time Temp Pulse Resp BP MAP (mmHg) SpO2 O2 Device   03/04/23 0734 -- 63 19 110/59 80 97 % None (Room air)   03/04/23 0004 98 8 °F (37 1 °C) 72 -- 115/55 -- 95 % --   03/03/23 1909 100 8 °F   (38 2 °C)   Abnormal  77 18 108/66 81 96 % None (Room air)   03/03/23 1459 98 1 °F (36 7 °C) 90 18 109/57 74 96 % None (Room air)   03/03/23 0838 98 °F (36 7 °C) 78 19 129/70 91 99 % None (Room air)             Pertinent Labs/Diagnostic Results:       Results from last 7 days   Lab Units 03/05/23  0450 03/04/23  0539 03/03/23  0456 03/02/23  1237 02/27/23  1534   WBC Thousand/uL 6 55 4 95 5 95 11 73* 8 22   HEMOGLOBIN g/dL 10 0* 9 4* 9 9* 13 0 10 2*   HEMATOCRIT % 34 2* 31 9* 33 3* 44 3 34 1*   PLATELETS Thousands/uL 377 334 352 575* 412*   NEUTROS ABS Thousands/µL  --   --   --  8 29* 5 51         Results from last 7 days   Lab Units 03/05/23  0450 03/04/23  0539 03/03/23  0456 03/02/23  1237 02/27/23  1534   SODIUM mmol/L 139 137 136 136 138   POTASSIUM mmol/L 3 7 3 6 3 7 3 7 4 1   CHLORIDE mmol/L 107 106 105 101 106   CO2 mmol/L 27 28 25 24 26   ANION GAP mmol/L 5 3* 6 11 6   BUN mg/dL 4* 4* 8 14 11   CREATININE mg/dL 0 76 0 74 0 79 1 07 0 92   EGFR ml/min/1 73sq m 123 124 121 93 111   CALCIUM mg/dL 8 8 8 6 8 4 9 7 8 7     Results from last 7 days   Lab Units 03/05/23  0450 03/04/23  0539 03/03/23  0456 03/02/23  1237 02/27/23  1534   AST U/L 23 23 25 25 36   ALT U/L 35 36 34 42 38   ALK PHOS U/L 86 82 87 97 75   TOTAL PROTEIN g/dL 6 0* 6 0* 6 0* 7 8 6 4   ALBUMIN g/dL 3 7 3 5 3 6 4 7 4 0   TOTAL BILIRUBIN mg/dL 0 37 0 49 0 65 0 92 0 54         Results from last 7 days   Lab Units 03/05/23  0450 03/04/23  0539 03/03/23  0456 03/02/23  1237 02/27/23  1534   GLUCOSE RANDOM mg/dL 84 83 87 87 79       Results from last 7 days   Lab Units 03/04/23  0539 03/02/23  1237   PROCALCITONIN ng/ml 0 13 0 09     Results from last 7 days   Lab Units 03/03/23  0456 03/02/23  1237   LACTIC ACID mmol/L 1 4 1 7       Results from last 7 days   Lab Units 03/02/23  1237 02/27/23  1534   LIPASE u/L 21 40       Results from last 7 days   Lab Units 03/02/23  1505   CLARITY UA  Slightly Cloudy   COLOR UA  Yellow   SPEC GRAV UA  >=1 030   PH UA  6 0   GLUCOSE UA mg/dl Negative   KETONES UA mg/dl Negative   BLOOD UA  Negative   PROTEIN UA mg/dl Trace*   NITRITE UA  Negative   BILIRUBIN UA  Small*   UROBILINOGEN UA E U /dl 0 2   LEUKOCYTES UA  Negative   WBC UA /hpf 1-2   RBC UA /hpf 0-1   BACTERIA UA /hpf Occasional   EPITHELIAL CELLS WET PREP /hpf Occasional   MUCUS THREADS  Innumerable*       Results from last 7 days   Lab Units 03/03/23  0458   SALMONELLA SP PCR  None Detected   SHIGELLA SP/ENTEROINVASIVE E  COLI (EIEC)  None Detected   CAMPYLOBACTER SP (JEJUNI AND COLI)  None Detected   SHIGA TOXIN 1/SHIGA TOXIN 2  None Detected         Results from last 7 days   Lab Units 03/02/23  5262   BLOOD CULTURE  No Growth at 48 hrs  No Growth at 48 hrs         Scheduled Medications:    cholestyramine sugar free, 4 g, Oral, BID  DULoxetine, 60 mg, Oral, Daily  enoxaparin, 40 mg, Subcutaneous, Daily      Continuous IV Infusions:  sodium chloride, 125 mL/hr, Intravenous, Continuous      PRN Meds:  acetaminophen, 650 mg, Oral, Q6H PRN  ALPRAZolam, 0 5 mg, Oral, TID PRN  HYDROmorphone, 0 5 mg, Intravenous, Q3H PRN  ondansetron, 4 mg, Intravenous, Q6H PRN        Discharge Plan: to be determined     Network Utilization Review Department  ATTENTION: Please call with any questions or concerns to [phone] and carefully listen to the prompts so that you are directed to the right person  All voicemails are confidential   Ricki Haider all requests for admission clinical reviews, approved or denied determinations and any other requests to dedicated fax number below belonging to the campus where the patient is receiving treatment   List of dedicated fax numbers for the Facilities:  [address] DENIALS (Administrative/Medical Necessity) [phone]   [address] (Maternity/NICU/Pediatrics) [phone]    Ashtyn Perez [phone]   [facility] [phone]   [address] [place] [address] Carmine Cortes 28 [phone]   [facility] Leonora Hartman [place] [address] 815 [facility] [phone]

## 2023-03-06 NOTE — PLAN OF CARE
Problem: PAIN - ADULT  Goal: Verbalizes/displays adequate comfort level or baseline comfort level  Description: Interventions:  - Encourage patient to monitor pain and request assistance  - Assess pain using appropriate pain scale  - Administer analgesics based on type and severity of pain and evaluate response  - Implement non-pharmacological measures as appropriate and evaluate response  - Consider cultural and social influences on pain and pain management  - Notify physician/advanced practitioner if interventions unsuccessful or patient reports new pain  Outcome: Progressing     Problem: INFECTION - ADULT  Goal: Absence or prevention of progression during hospitalization  Description: INTERVENTIONS:  - Assess and monitor for signs and symptoms of infection  - Monitor lab/diagnostic results  - Monitor all insertion sites, i e  indwelling lines, tubes, and drains  - Monitor endotracheal if appropriate and nasal secretions for changes in amount and color  - [place] appropriate cooling/warming therapies per order  - Administer medications as ordered  - Instruct and encourage patient and family to use good hand hygiene technique  - Identify and instruct in appropriate isolation precautions for identified infection/condition  Outcome: Progressing     Problem: DISCHARGE PLANNING  Goal: Discharge to home or other facility with appropriate resources  Description: INTERVENTIONS:  - Identify barriers to discharge w/patient and caregiver  - Arrange for needed discharge resources and transportation as appropriate  - Identify discharge learning needs (meds, wound care, etc )  - Arrange for interpretive services to assist at discharge as needed  - Refer to Case Management Department for coordinating discharge planning if the patient needs post-hospital services based on physician/advanced practitioner order or complex needs related to functional status, cognitive ability, or social support system  Outcome: Progressing Problem: Knowledge Deficit  Goal: Patient/family/caregiver demonstrates understanding of disease process, treatment plan, medications, and discharge instructions  Description: Complete learning assessment and assess knowledge base    Interventions:  - Provide teaching at level of understanding  - Provide teaching via preferred learning methods  Outcome: Progressing     Problem: GASTROINTESTINAL - ADULT  Goal: Minimal or absence of nausea and/or vomiting  Description: INTERVENTIONS:  - Administer IV fluids if ordered to ensure adequate hydration  - Maintain NPO status until nausea and vomiting are resolved  - Nasogastric tube if ordered  - Administer ordered antiemetic medications as needed  - Provide nonpharmacologic comfort measures as appropriate  - Advance diet as tolerated, if ordered  - Consider nutrition services referral to assist patient with adequate nutrition and appropriate food choices  Outcome: Progressing  Goal: Establish and maintain optimal ostomy function  Description: INTERVENTIONS:  - Assess bowel function  - Encourage oral fluids to ensure adequate hydration  - Administer IV fluids if ordered to ensure adequate hydration   - Administer ordered medications as needed  - Encourage mobilization and activity  - Nutrition services referral to assist patient with appropriate food choices  - Assess stoma site  - Consider wound care consult   Outcome: Progressing

## 2023-03-06 NOTE — TELEPHONE ENCOUNTER
----- Message from Gosia Baldwin MD sent at 3/6/2023 11:00 AM EST -----  Thank you for allowing us to participate in the care of your patient, Rachel Rogers, who was hospitalized from 3/2/2023 through 3/6/2023 with the admitting diagnosis of due to High output in ostomy and dehydration  Patient reported high output PTA, weakness and syncopal episode  Upon arrival patient was given IVF, ostomy output monitoring, patient placed on Questran and encouraged compliance to help with output in ostomy, will be discharged with medication  Patient was seen by GI and encouraged outpatient follow-up with [facility] specialist Dr Meghan Butler  Patient abdominal pain controlled while inpatient and discharged, patient reported contacting specialists at [facility] for associated pain management referral for continuity of pain control  Patient will start journaling I's and O's and identify triggers to high output in ostomy  Patient was seen by cardiology for reported syncope event with recommendations for staying hydrated and no cardiac intervention or follow-up  All other chronic conditions managed with home medications or inpatient equivalent  If you have any additional questions or would like to discuss further, please feel free to contact me      Gosia Baldwin MD  [facility] Internal Medicine, Hospitalist  [phone]

## 2023-03-06 NOTE — DISCHARGE SUMMARY
Julian 43  Discharge- Chente Duran 1995, 32 y o  male MRN: [id number]  Unit/Bed#: [address] [place] [address] Encounter: [id number]  Primary Care Provider: Pako Alexr, DO   Date and time admitted to hospital: 3/2/2023 12:08 PM    Dehydration-resolved as of 3/4/2023  Assessment & Plan  Per patient report    · BUN-normal  · IVF  · I's and O's  · Urine retention protocol in place    Sepsis [facility])  Assessment & Plan  Evident with leukocytosis, tachycardia, tachypnea, possible GI source    · Blood cultures- NGTD 72 H  · Stool cultures- negative for Campylobacter, Salmonella, Shigella,  · Lactic acid-negative /Pro-Rubin- WNL  · No imaging on admission, consider CT if symptoms worsen  · Empiric Levaquin and metronidazole  DC due to no signs of infection  · GI consulted: ADAT as listed, follow-up OP with [facility]    Ileostomy present [facility])  Assessment & Plan  Recurrent,    High output ostomy per patient report  · I's and O's completed  · Patient to identify triggers and start journaling on discharge for outpatient high output into ostomy  · Managed by specialist at [facility]  · GI consulted: ADAT low fiber high residue per GI recs  · Continue Questran prescribed on discharge      Syncope-resolved as of 3/4/2023  Assessment & Plan  From dehydration per patient report PTA    · Echo- 2 months ago  · Orthostatics- unremarkable  · Telemetry completed  · Cardiac : Orthostatic negative and echo 2 months ago WNL  No need to repeat echocardiogram, stay hydrated, consider compression socks        History of ulcerative colitis  Assessment & Plan  A/C, GI consulted recs as below    · Patient has history of ulcerative colitis, follows [facility] and outpatient GI      * Abdominal pain-resolved as of 3/6/2023  Assessment & Plan  Recurrent, currently resolved    History of UC with multiple extensive abdominal surgeries, creation of a J-pouch and ileostomy      • Follows with specialist at [facility], reports surgery planned for April for revision  · No imaging on this admission, will obtain if pain repeats  · 2/26/2023: CT abdomen: Post colectomy  J-pouch is seen extending from the right lower quadrant to the rectum with similar appearance of mural thickening comparison to the prior exams  No other potential acute pathology visualized on CT of the abdomen and pelvis with IV with oral contrast     Chronic pain syndrome  Assessment & Plan  Unresolved,     · monitor pain meds to avoid OIH  ·  pt on opoid meds complications and tolerance  · Patient not a candidate for OP Chronic pain management due to type of pain not on MSK/spinal in nature  · - reviewed  · Continue home duloxetine, previously prescribed Norco, Tylenol as needed,   · [facility] Hermiloy[facility] Lopez, is giving a referral to their pain management program per patient report  · Patient has video visit Tuesday, March 7, 2023 for pain management Dr Terri Eddy      Discharged  Medical Problems     Resolved Problems  Date Reviewed: 3/6/2023          Resolved    * (Principal) Abdominal pain 3/6/2023     Resolved by  Nba Donovan MD    Dehydration 3/4/2023     Resolved by  Nba Donovan MD    Tachycardia 3/3/2023     Resolved by  Nba Donovan MD    Syncope 3/4/2023     Resolved by  Nba Donovan MD        Discharging Physician / Practitioner:  Nba Donovan MD  PCP: Therese Boo DO  Admission Date:   Admission Orders (From admission, onward)     Ordered        03/03/23 2007  Inpatient Admission  Once            03/02/23 1554  Place in Observation  Once                      Discharge Date: 03/06/23    Consultations During Hospital Stay:  · GI     Procedures Performed:   · N/A    Significant Findings / Test Results:   · N/A N/A    Incidental Findings:   · N/A      Test Results Pending at Discharge (will require follow up):   · N/A     Outpatient Tests Requested:  · N/A N/A    Complications: N/A    Reason for Admission: High output in ostomy and dehydration    Hospital Course:   Mei Vance is a 32 y o  male patient who originally presented to the hospital on 3/2/2023 due to High output in ostomy and dehydration  Patient reported high output PTA, weakness and syncopal episode  Upon arrival patient was given IVF, ostomy output monitoring, patient placed on Questran and encouraged compliance to help with output in ostomy, will be discharged with medication  Patient was seen by GI and encouraged outpatient follow-up with [facility] specialist Dr Homer Orta  Patient abdominal pain controlled while inpatient and discharged, patient reported contacting specialists at [facility] for associated pain management referral for continuity of pain control with telehealth appointment tomorrow at 1 PM 3/7/2023  Patient will start journaling I's and O's and identify triggers to high output in ostomy  Patient was seen by cardiology for reported syncope event with recommendations for staying hydrated and no cardiac intervention or follow-up  All other chronic conditions managed with home medications or inpatient equivalent  Please see above list of diagnoses and related plan for additional information  Condition at Discharge: fair    Discharge Day Visit / Exam:   Subjective: Patient seen and examined at bedside, reported no abdominal pain, stated that he was able to tolerate his food and was compliant with Questran  Patient stated he would inform staff of outpatient follow-up pain management appointment that [facility] specialist Dr Homer Orta will refer him to from the affiliated facility  Vitals: Blood Pressure: 110/68 (03/06/23 0732)  Pulse: 76 (03/06/23 0732)  Temperature: 97 7 °F (36 5 °C) (03/06/23 0732)  Temp Source: Oral (03/06/23 0732)  Respirations: 18 (03/06/23 0732)  Height: 5' 9" (175 3 cm) (03/02/23 1755)  Weight - Scale: 77 6 kg (171 lb 1 2 oz) (03/02/23 1755)  SpO2: 98 % (03/06/23 0732)  Exam:   Physical Exam  Vitals and nursing note reviewed  Constitutional:       General: He is not in acute distress  Appearance: He is well-developed  HENT:      Head: Normocephalic and atraumatic  Eyes:      Conjunctiva/sclera: Conjunctivae normal    Cardiovascular:      Rate and Rhythm: Normal rate and regular rhythm  Heart sounds: No murmur heard  Pulmonary:      Effort: Pulmonary effort is normal  No respiratory distress  Breath sounds: Normal breath sounds  No wheezing or rales  Abdominal:      General: There is no distension  Palpations: Abdomen is soft  Tenderness: There is no abdominal tenderness  There is no guarding  Comments: ostomy   Musculoskeletal:         General: No swelling  Cervical back: Neck supple  Skin:     General: Skin is warm and dry  Capillary Refill: Capillary refill takes less than 2 seconds  Neurological:      Mental Status: He is alert and oriented to person, place, and time  Psychiatric:         Mood and Affect: Mood normal          Behavior: Behavior normal           Discussion with Family: Patient declined call to   Discharge instructions/Information to patient and family:   See after visit summary for information provided to patient and family  Provisions for Follow-Up Care:  See after visit summary for information related to follow-up care and any pertinent home health orders  Disposition:   Home    Planned Readmission: no     Discharge Statement:  I spent 45 minutes discharging the patient  This time was spent on the day of discharge  I had direct contact with the patient on the day of discharge  Greater than 50% of the total time was spent examining patient, answering all patient questions, arranging and discussing plan of care with patient as well as directly providing post-discharge instructions  Additional time then spent on discharge activities      Discharge Medications:  See after visit summary for reconciled discharge medications provided to patient and/or family        **Please Note: This note may have been constructed using a voice recognition system**

## 2023-03-06 NOTE — TELEPHONE ENCOUNTER
----- Message from Marie Hernandez MD sent at 3/6/2023 11:53 AM EST -----  Thank you for allowing us to participate in the care of your patient, Jamie Scott, who was hospitalized from 3/2/2023 through 3/6/2023       Patient has outpatient video visit for pain management with Dr Ayesha Meier a associated [facility] pain management specialist on 3/7/2023 at 1 PM    Marie Hernandez MD  [place] [address] Internal Medicine, Hospitalist  [phone]

## 2023-03-06 NOTE — PLAN OF CARE
Problem: PAIN - ADULT  Goal: Verbalizes/displays adequate comfort level or baseline comfort level  Description: Interventions:  - Encourage patient to monitor pain and request assistance  - Assess pain using appropriate pain scale  - Administer analgesics based on type and severity of pain and evaluate response  - Implement non-pharmacological measures as appropriate and evaluate response  - Consider cultural and social influences on pain and pain management  - Notify physician/advanced practitioner if interventions unsuccessful or patient reports new pain  Outcome: Progressing     Problem: INFECTION - ADULT  Goal: Absence or prevention of progression during hospitalization  Description: INTERVENTIONS:  - Assess and monitor for signs and symptoms of infection  - Monitor lab/diagnostic results  - Monitor all insertion sites, i e  indwelling lines, tubes, and drains  - Monitor endotracheal if appropriate and nasal secretions for changes in amount and color  - Bird In Hand appropriate cooling/warming therapies per order  - Administer medications as ordered  - Instruct and encourage patient and family to use good hand hygiene technique  - Identify and instruct in appropriate isolation precautions for identified infection/condition  Outcome: Progressing     Problem: DISCHARGE PLANNING  Goal: Discharge to home or other facility with appropriate resources  Description: INTERVENTIONS:  - Identify barriers to discharge w/patient and caregiver  - Arrange for needed discharge resources and transportation as appropriate  - Identify discharge learning needs (meds, wound care, etc )  - Arrange for interpretive services to assist at discharge as needed  - Refer to Case Management Department for coordinating discharge planning if the patient needs post-hospital services based on physician/advanced practitioner order or complex needs related to functional status, cognitive ability, or social support system  Outcome: Progressing Problem: Knowledge Deficit  Goal: Patient/family/caregiver demonstrates understanding of disease process, treatment plan, medications, and discharge instructions  Description: Complete learning assessment and assess knowledge base    Interventions:  - Provide teaching at level of understanding  - Provide teaching via preferred learning methods  Outcome: Progressing     Problem: GASTROINTESTINAL - ADULT  Goal: Minimal or absence of nausea and/or vomiting  Description: INTERVENTIONS:  - Administer IV fluids if ordered to ensure adequate hydration  - Maintain NPO status until nausea and vomiting are resolved  - Nasogastric tube if ordered  - Administer ordered antiemetic medications as needed  - Provide nonpharmacologic comfort measures as appropriate  - Advance diet as tolerated, if ordered  - Consider nutrition services referral to assist patient with adequate nutrition and appropriate food choices  Outcome: Progressing  Goal: Establish and maintain optimal ostomy function  Description: INTERVENTIONS:  - Assess bowel function  - Encourage oral fluids to ensure adequate hydration  - Administer IV fluids if ordered to ensure adequate hydration   - Administer ordered medications as needed  - Encourage mobilization and activity  - Nutrition services referral to assist patient with appropriate food choices  - Assess stoma site  - Consider wound care consult   Outcome: Progressing

## 2023-03-07 NOTE — UTILIZATION REVIEW
NOTIFICATION OF ADMISSION DISCHARGE   This is a Notification of Discharge from [address]  Please be advised that this patient has been discharge from our facility  Below you will find the admission and discharge date and time including the patient’s disposition  UTILIZATION REVIEW CONTACT:  Bryan Ortiz  Utilization   Network Utilization Review Department  Phone: [phone] x carefully listen to the prompts  All voicemails are confidential   Email: [email or web address]     ADMISSION INFORMATION  PRESENTATION DATE: 3/2/2023 12:08 PM  OBERVATION ADMISSION DATE:   INPATIENT ADMISSION DATE: 3/3/23  8:07 PM   DISCHARGE DATE: 3/6/2023 12:11 PM   DISPOSITION:Home/Self Care    IMPORTANT INFORMATION:  Send all requests for admission clinical reviews, approved or denied determinations and any other requests to dedicated fax number below belonging to the campus where the patient is receiving treatment   List of dedicated fax numbers:  [address] DENIALS (Administrative/Medical Necessity) [phone]   [address] (Maternity/NICU/Pediatrics) [phone]   [facility] [phone]   [facility] 87 [phone]   [address] [phone]   220 [facility] [phone]   90 [facility] [phone]   [address] [place] [address] 119 [phone]   [facility]  [phone]   4054 [facility] [phone]   412 [facility] [address] [facility] [phone]

## 2023-03-08 LAB
BACTERIA BLD CULT: NORMAL
BACTERIA BLD CULT: NORMAL

## 2023-03-14 ENCOUNTER — APPOINTMENT (EMERGENCY)
Dept: RADIOLOGY | Facility: HOSPITAL | Age: 28
End: 2023-03-14

## 2023-03-14 ENCOUNTER — HOSPITAL ENCOUNTER (EMERGENCY)
Facility: HOSPITAL | Age: 28
Discharge: HOME | End: 2023-03-14
Attending: EMERGENCY MEDICINE

## 2023-03-14 VITALS
SYSTOLIC BLOOD PRESSURE: 137 MMHG | OXYGEN SATURATION: 99 % | WEIGHT: 178.4 LBS | HEART RATE: 78 BPM | TEMPERATURE: 98.4 F | BODY MASS INDEX: 26.35 KG/M2 | RESPIRATION RATE: 20 BRPM | DIASTOLIC BLOOD PRESSURE: 93 MMHG

## 2023-03-14 DIAGNOSIS — R10.84 GENERALIZED ABDOMINAL PAIN: Primary | ICD-10-CM

## 2023-03-14 DIAGNOSIS — R11.2 NAUSEA AND VOMITING: ICD-10-CM

## 2023-03-14 LAB
ALBUMIN SERPL BCP-MCNC: 4.8 G/DL (ref 3.5–5)
ALP SERPL-CCNC: 99 U/L (ref 34–104)
ALT SERPL W P-5'-P-CCNC: 47 U/L (ref 7–52)
ANION GAP SERPL CALCULATED.3IONS-SCNC: 9 MMOL/L (ref 4–13)
AST SERPL W P-5'-P-CCNC: 26 U/L (ref 13–39)
BASOPHILS # BLD AUTO: 0.1 THOUSANDS/ÂΜL (ref 0–0.1)
BASOPHILS NFR BLD AUTO: 1 % (ref 0–1)
BILIRUB SERPL-MCNC: 0.79 MG/DL (ref 0.2–1)
BUN SERPL-MCNC: 10 MG/DL (ref 5–25)
CALCIUM SERPL-MCNC: 9.9 MG/DL (ref 8.4–10.2)
CHLORIDE SERPL-SCNC: 102 MMOL/L (ref 96–108)
CO2 SERPL-SCNC: 25 MMOL/L (ref 21–32)
CREAT SERPL-MCNC: 0.83 MG/DL (ref 0.6–1.3)
EOSINOPHIL # BLD AUTO: 0.07 THOUSAND/ÂΜL (ref 0–0.61)
EOSINOPHIL NFR BLD AUTO: 1 % (ref 0–6)
ERYTHROCYTE [DISTWIDTH] IN BLOOD BY AUTOMATED COUNT: 17.4 % (ref 11.6–15.1)
GFR SERPL CREATININE-BSD FRML MDRD: 118 ML/MIN/1.73SQ M
GLUCOSE SERPL-MCNC: 86 MG/DL (ref 65–140)
HCT VFR BLD AUTO: 38.1 % (ref 36.5–49.3)
HGB BLD-MCNC: 11.4 G/DL (ref 12–17)
IMM GRANULOCYTES # BLD AUTO: 0.05 THOUSAND/UL (ref 0–0.2)
IMM GRANULOCYTES NFR BLD AUTO: 0 % (ref 0–2)
LACTATE SERPL-SCNC: 1.1 MMOL/L (ref 0.5–2)
LIPASE SERPL-CCNC: 17 U/L (ref 11–82)
LYMPHOCYTES # BLD AUTO: 1.35 THOUSANDS/ÂΜL (ref 0.6–4.47)
LYMPHOCYTES NFR BLD AUTO: 10 % (ref 14–44)
MAGNESIUM SERPL-MCNC: 1.9 MG/DL (ref 1.9–2.7)
MCH RBC QN AUTO: 18.9 PG (ref 26.8–34.3)
MCHC RBC AUTO-ENTMCNC: 29.9 G/DL (ref 31.4–37.4)
MCV RBC AUTO: 63 FL (ref 82–98)
MONOCYTES # BLD AUTO: 0.7 THOUSAND/ÂΜL (ref 0.17–1.22)
MONOCYTES NFR BLD AUTO: 5 % (ref 4–12)
NEUTROPHILS # BLD AUTO: 10.83 THOUSANDS/ÂΜL (ref 1.85–7.62)
NEUTS SEG NFR BLD AUTO: 83 % (ref 43–75)
NRBC BLD AUTO-RTO: 0 /100 WBCS
PLATELET # BLD AUTO: 365 THOUSANDS/UL (ref 149–390)
PMV BLD AUTO: 8.4 FL (ref 8.9–12.7)
POTASSIUM SERPL-SCNC: 4 MMOL/L (ref 3.5–5.3)
PROT SERPL-MCNC: 7.7 G/DL (ref 6.4–8.4)
RBC # BLD AUTO: 6.04 MILLION/UL (ref 3.88–5.62)
SODIUM SERPL-SCNC: 136 MMOL/L (ref 135–147)
WBC # BLD AUTO: 13.1 THOUSAND/UL (ref 4.31–10.16)

## 2023-03-14 RX ORDER — ONDANSETRON 2 MG/ML
4 INJECTION INTRAMUSCULAR; INTRAVENOUS ONCE
Status: COMPLETED | OUTPATIENT
Start: 2023-03-14 | End: 2023-03-14

## 2023-03-14 RX ORDER — ONDANSETRON 4 MG/1
4 TABLET, ORALLY DISINTEGRATING ORAL EVERY 6 HOURS PRN
Qty: 20 TABLET | Refills: 0 | Status: SHIPPED | OUTPATIENT
Start: 2023-03-14 | End: 2023-03-15

## 2023-03-14 RX ORDER — ONDANSETRON 4 MG/1
4 TABLET, ORALLY DISINTEGRATING ORAL ONCE
Status: COMPLETED | OUTPATIENT
Start: 2023-03-14 | End: 2023-03-14

## 2023-03-14 RX ORDER — PANTOPRAZOLE SODIUM 40 MG/10ML
40 INJECTION, POWDER, LYOPHILIZED, FOR SOLUTION INTRAVENOUS ONCE
Status: COMPLETED | OUTPATIENT
Start: 2023-03-14 | End: 2023-03-14

## 2023-03-14 RX ORDER — HYDROMORPHONE HCL/PF 1 MG/ML
1 SYRINGE (ML) INJECTION ONCE
Status: COMPLETED | OUTPATIENT
Start: 2023-03-14 | End: 2023-03-14

## 2023-03-14 RX ORDER — FENTANYL CITRATE 50 UG/ML
100 INJECTION, SOLUTION INTRAMUSCULAR; INTRAVENOUS ONCE
Status: COMPLETED | OUTPATIENT
Start: 2023-03-14 | End: 2023-03-14

## 2023-03-14 RX ADMIN — PANTOPRAZOLE SODIUM 40 MG: 40 INJECTION, POWDER, FOR SOLUTION INTRAVENOUS at 12:48

## 2023-03-14 RX ADMIN — ONDANSETRON 4 MG: 4 TABLET, ORALLY DISINTEGRATING ORAL at 19:28

## 2023-03-14 RX ADMIN — SODIUM CHLORIDE 1000 ML: 0.9 INJECTION, SOLUTION INTRAVENOUS at 12:48

## 2023-03-14 RX ADMIN — FENTANYL CITRATE 100 MCG: 50 INJECTION, SOLUTION INTRAMUSCULAR; INTRAVENOUS at 14:50

## 2023-03-14 RX ADMIN — HYDROMORPHONE HYDROCHLORIDE 1 MG: 1 INJECTION, SOLUTION INTRAMUSCULAR; INTRAVENOUS; SUBCUTANEOUS at 17:56

## 2023-03-14 RX ADMIN — ONDANSETRON 4 MG: 2 INJECTION INTRAMUSCULAR; INTRAVENOUS at 12:47

## 2023-03-14 RX ADMIN — HYDROMORPHONE HYDROCHLORIDE 1 MG: 1 INJECTION, SOLUTION INTRAMUSCULAR; INTRAVENOUS; SUBCUTANEOUS at 13:37

## 2023-03-14 RX ADMIN — ONDANSETRON 4 MG: 2 INJECTION INTRAMUSCULAR; INTRAVENOUS at 15:37

## 2023-03-14 RX ADMIN — IOHEXOL 100 ML: 350 INJECTION, SOLUTION INTRAVENOUS at 17:35

## 2023-03-14 NOTE — ED PROVIDER NOTES
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 Taking?    ALPRAZolam (XANAX) 0 5 mg tablet   No No   Si-2 po q8hrs prn anxiety   DULoxetine (CYMBALTA) 60 mg delayed release capsule   No No   Sig: Take 1 capsule (60 mg total) by mouth daily   acetaminophen (TYLENOL) 325 mg tablet   No No   Sig: Take 2 tablets (650 mg total) by mouth every 6 (six) hours as needed for mild pain, headaches or fever   ondansetron (ZOFRAN-ODT) 4 mg disintegrating tablet   No No   Sig: Take 1 tablet (4 mg total) by mouth every 6 (six) hours as needed for nausea for up to 3 days      Facility-Administered Medications: None       Past Medical History:   Diagnosis Date   • Ankylosing spondylitis (HCC)    • Anxiety    • Bowel obstruction (HCC)    • Clostridium difficile colitis 2018   • Colitis    • 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[address] ) 2018   • Pancreatitis    • Ulcerative colitis ([facility] [address] )        Past Surgical History:   Procedure Laterality Date   • COLECTOMY TOTAL      with ileal pouch and anastemosis   • IR PICC PLACEMENT SINGLE LUMEN  3/1/2022   • TOTAL COLECTOMY         Family History   Problem Relation Age of Onset   • Lung disease Neg Hx      I have reviewed and agree with the history as documented  E-Cigarette/Vaping   • E-Cigarette Use Never User      E-Cigarette/Vaping Substances   • Nicotine No    • THC No    • CBD No    • Flavoring No    • Other No    • Unknown No      Social History     Tobacco Use   • Smoking status: Never   • Smokeless tobacco: Never   Vaping Use   • Vaping Use: Never used   Substance Use Topics   • Alcohol use: Not Currently     Comment: pt states 5 a month/socially   • Drug use: No       Review of Systems   Constitutional: Negative  HENT: Negative  Eyes: Negative  Respiratory: Negative  Cardiovascular: Negative  Gastrointestinal: Positive for abdominal pain, nausea and vomiting  Endocrine: Negative  Genitourinary: Negative  Musculoskeletal: Negative  Skin: Negative  Allergic/Immunologic: Negative  Neurological: Negative  Hematological: Negative  Psychiatric/Behavioral: Negative  All other systems reviewed and are negative  Physical Exam  Physical Exam  Vitals and nursing note reviewed  Constitutional:       Appearance: Normal appearance  HENT:      Head: Normocephalic and atraumatic  Nose: Nose normal       Mouth/Throat:      Mouth: Mucous membranes are moist    Eyes:      Extraocular Movements: Extraocular movements intact  Pupils: Pupils are equal, round, and reactive to light  Cardiovascular:      Rate and Rhythm: Normal rate and regular rhythm  Pulmonary:      Effort: Pulmonary effort is normal       Breath sounds: Normal breath sounds  Abdominal:      General: Abdomen is flat  Bowel sounds are normal       Palpations: Abdomen is soft  Tenderness: There is generalized abdominal tenderness  There is no right CVA tenderness, left CVA tenderness, guarding or rebound  Negative signs include Beatty's sign, Rovsing's sign, McBurney's sign, psoas sign and obturator sign  Musculoskeletal:         General: Normal range of motion  Cervical back: Normal range of motion and neck supple     Skin: General: Skin is warm  Capillary Refill: Capillary refill takes less than 2 seconds  Neurological:      General: No focal deficit present  Mental Status: He is alert and oriented to person, place, and time  Mental status is at baseline  Psychiatric:         Mood and Affect: Mood normal          Thought Content:  Thought content normal          Vital Signs  ED Triage Vitals [03/14/23 1150]   Temperature Pulse Respirations Blood Pressure SpO2   98 4 °F (36 9 °C) 93 20 (!) 147/121 100 %      Temp Source Heart Rate Source Patient Position - Orthostatic VS BP Location FiO2 (%)   Tympanic Monitor Sitting Left arm --      Pain Score       8           Vitals:    03/14/23 1500 03/14/23 1645 03/14/23 1927 03/14/23 1930   BP: 147/94 116/74 137/93 137/93   Pulse: 84 78 74 78   Patient Position - Orthostatic VS: Lying Lying Sitting Sitting         Visual Acuity      ED Medications  Medications   sodium chloride 0 9 % bolus 1,000 mL (0 mL Intravenous Stopped 3/14/23 1417)   ondansetron (ZOFRAN) injection 4 mg (4 mg Intravenous Given 3/14/23 1247)   pantoprazole (PROTONIX) injection 40 mg (40 mg Intravenous Given 3/14/23 1248)   HYDROmorphone (DILAUDID) injection 1 mg (1 mg Intravenous Given 3/14/23 1337)   fentanyl citrate (PF) 100 MCG/2ML 100 mcg (100 mcg Intravenous Given 3/14/23 1450)   ondansetron (ZOFRAN) injection 4 mg (4 mg Intravenous Given 3/14/23 1537)   iohexol (OMNIPAQUE) 350 MG/ML injection (SINGLE-DOSE) 100 mL (100 mL Intravenous Given 3/14/23 1735)   HYDROmorphone (DILAUDID) injection 1 mg (1 mg Intravenous Given 3/14/23 1756)   ondansetron (ZOFRAN-ODT) dispersible tablet 4 mg (4 mg Oral Given 3/14/23 1928)       Diagnostic Studies  Results Reviewed     Procedure Component Value Units Date/Time    Comprehensive metabolic panel [[id number]] Collected: 03/14/23 1239    Lab Status: Final result Specimen: Blood from Arm, Left Updated: 03/14/23 1335     Sodium 136 mmol/L      Potassium 4 0 mmol/L Chloride 102 mmol/L      CO2 25 mmol/L      ANION GAP 9 mmol/L      BUN 10 mg/dL      Creatinine 0 83 mg/dL      Glucose 86 mg/dL      Calcium 9 9 mg/dL      AST 26 U/L      ALT 47 U/L      Alkaline Phosphatase 99 U/L      Total Protein 7 7 g/dL      Albumin 4 8 g/dL      Total Bilirubin 0 79 mg/dL      eGFR 118 ml/min/1 73sq m     Narrative:      [place] guidelines for Chronic Kidney Disease (CKD):   •  Stage 1 with normal or high GFR (GFR > 90 mL/min/1 73 square meters)  •  Stage 2 Mild CKD (GFR = 60-89 mL/min/1 73 square meters)  •  Stage 3A Moderate CKD (GFR = 45-59 mL/min/1 73 square meters)  •  Stage 3B Moderate CKD (GFR = 30-44 mL/min/1 73 square meters)  •  Stage 4 Severe CKD (GFR = 15-29 mL/min/1 73 square meters)  •  Stage 5 End Stage CKD (GFR <15 mL/min/1 73 square meters)  Note: GFR calculation is accurate only with a steady state creatinine    Magnesium [[id number]]  (Normal) Collected: 03/14/23 1239    Lab Status: Final result Specimen: Blood from Arm, Left Updated: 03/14/23 1335     Magnesium 1 9 mg/dL     Lipase [[id number]]  (Normal) Collected: 03/14/23 1239    Lab Status: Final result Specimen: Blood from Arm, Left Updated: 03/14/23 1334     Lipase 17 u/L     Lactic acid [[id number]]  (Normal) Collected: 03/14/23 1239    Lab Status: Final result Specimen: Blood from Arm, Left Updated: 03/14/23 1334     LACTIC ACID 1 1 mmol/L     Narrative:      Result may be elevated if tourniquet was used during collection      CBC and differential [[id number]]  (Abnormal) Collected: 03/14/23 1239    Lab Status: Final result Specimen: Blood from Arm, Left Updated: 03/14/23 1304     WBC 13 10 Thousand/uL      RBC 6 04 Million/uL      Hemoglobin 11 4 g/dL      Hematocrit 38 1 %      MCV 63 fL      MCH 18 9 pg      MCHC 29 9 g/dL      RDW 17 4 %      MPV 8 4 fL      Platelets 639 Thousands/uL      nRBC 0 /100 WBCs      Neutrophils Relative 83 %      Immat GRANS % 0 %      Lymphocytes Relative 10 %      Monocytes Relative 5 %      Eosinophils Relative 1 %      Basophils Relative 1 %      Neutrophils Absolute 10 83 Thousands/µL      Immature Grans Absolute 0 05 Thousand/uL      Lymphocytes Absolute 1 35 Thousands/µL      Monocytes Absolute 0 70 Thousand/µL      Eosinophils Absolute 0 07 Thousand/µL      Basophils Absolute 0 10 Thousands/µL                  CT abdomen pelvis with contrast   Final Result by Samuel Moerira MD (03/14 1847)      No new findings  Chronic J-pouch thickening similar appearance to the comparison study from 2/26/2023  Workstation performed: [id number]         XR abdomen obstruction series   Final Result by Elvin Smith DO (03/14 3689)   1  Multiple prominent to mildly dilated loops of small bowel primarily distributed within the left hemiabdomen  Findings may represent ileus versus developing small bowel obstruction  Further characterization with CT abdomen and pelvis is recommended  The study was marked in [facility] for immediate notification  Workstation performed: [id number]                    Procedures  Procedures         ED Course                               SBIRT 22yo+    Flowsheet Row Most Recent Value   SBIRT (23 yo +)    In order to provide better care to our patients, we are screening all of our patients for alcohol and drug use  Would it be okay to ask you these screening questions? No Filed at: 03/14/2023 1252                    Medical Decision Making  Patient evaluated with labs x-ray  X-ray was suspicious for ileus versus developing small bowel obstruction so CT ordered of the abdomen pelvis care transitionED to Dr Saeid Carrillo  Generalized abdominal pain: acute illness or injury  Nausea and vomiting: acute illness or injury  Amount and/or Complexity of Data Reviewed  External Data Reviewed: labs, radiology and notes  Labs: ordered  Decision-making details documented in ED Course    Radiology: ordered  Risk  Prescription drug management  Disposition  Final diagnoses:   Generalized abdominal pain   Nausea and vomiting     Time reflects when diagnosis was documented in both MDM as applicable and the Disposition within this note     Time User Action Codes Description Comment    3/14/2023  7:05 PM Roxann [place] Add [R10 84] Generalized abdominal pain     3/14/2023  7:05 PM Roxann Montesorn Add [R11 2] Nausea and vomiting       ED Disposition     ED Disposition   Discharge    Condition   Stable    Date/Time   Tue Mar 14, 2023  7:14 PM    Phillip 79 discharge to home/self care  Follow-up Information     Follow up With Specialties Details Why Contact Info Additional Information    [address] [facility] Emergency Department Emergency Medicine  If symptoms worsen [address] [facility] [id number]  [address] [address] Emergency Department, [address], [place], [address]          Discharge Medication List as of 3/14/2023  7:15 PM      CONTINUE these medications which have CHANGED    Details   ondansetron (Zofran ODT) 4 mg disintegrating tablet Take 1 tablet (4 mg total) by mouth every 6 (six) hours as needed for nausea or vomiting, Starting Tue 3/14/2023, Normal         CONTINUE these medications which have NOT CHANGED    Details   acetaminophen (TYLENOL) 325 mg tablet Take 2 tablets (650 mg total) by mouth every 6 (six) hours as needed for mild pain, headaches or fever, Starting Tue 1/24/2023, No Print      ALPRAZolam (XANAX) 0 5 mg tablet 1-2 po q8hrs prn anxiety, Normal      DULoxetine (CYMBALTA) 60 mg delayed release capsule Take 1 capsule (60 mg total) by mouth daily, Starting Tue 2/7/2023, Normal      cholestyramine sugar free (QUESTRAN LIGHT) 4 g packet Take 1 packet (4 g total) by mouth 2 (two) times a day, Starting Mon 3/6/2023, Normal             No discharge procedures on file      PDMP Review Value Time User    PDMP Reviewed  Yes 3/6/2023 10:44 AM Meliza Rizvi MD          ED Provider  Electronically Signed by           Vikash Angela DO  03/15/23 5347

## 2023-03-14 NOTE — DISCHARGE INSTRUCTIONS
If you have any severe worsening of nausea and vomiting, severe abdominal pain, fevers, chills, return to the emergency department

## 2023-03-15 ENCOUNTER — HOSPITAL ENCOUNTER (OUTPATIENT)
Facility: HOSPITAL | Age: 28
Setting detail: OBSERVATION
Discharge: HOME | End: 2023-03-15
Attending: EMERGENCY MEDICINE | Admitting: INTERNAL MEDICINE

## 2023-03-15 VITALS
DIASTOLIC BLOOD PRESSURE: 59 MMHG | HEART RATE: 89 BPM | RESPIRATION RATE: 18 BRPM | SYSTOLIC BLOOD PRESSURE: 108 MMHG | OXYGEN SATURATION: 98 % | TEMPERATURE: 98 F

## 2023-03-15 DIAGNOSIS — K91.850 ILEAL POUCHITIS (HCC): ICD-10-CM

## 2023-03-15 DIAGNOSIS — R10.9 INTRACTABLE ABDOMINAL PAIN: Primary | ICD-10-CM

## 2023-03-15 DIAGNOSIS — R10.13 EPIGASTRIC ABDOMINAL PAIN: ICD-10-CM

## 2023-03-15 LAB
ALBUMIN SERPL BCP-MCNC: 4.7 G/DL (ref 3.5–5)
ALP SERPL-CCNC: 80 U/L (ref 34–104)
ALT SERPL W P-5'-P-CCNC: 39 U/L (ref 7–52)
ANION GAP SERPL CALCULATED.3IONS-SCNC: 10 MMOL/L (ref 4–13)
AST SERPL W P-5'-P-CCNC: 24 U/L (ref 13–39)
BASOPHILS # BLD AUTO: 0.15 THOUSANDS/ÂΜL (ref 0–0.1)
BASOPHILS NFR BLD AUTO: 2 % (ref 0–1)
BILIRUB SERPL-MCNC: 0.59 MG/DL (ref 0.2–1)
BILIRUB UR QL STRIP: NEGATIVE
BUN SERPL-MCNC: 10 MG/DL (ref 5–25)
CALCIUM SERPL-MCNC: 9 MG/DL (ref 8.4–10.2)
CHLORIDE SERPL-SCNC: 102 MMOL/L (ref 96–108)
CLARITY UR: CLEAR
CO2 SERPL-SCNC: 24 MMOL/L (ref 21–32)
COLOR UR: NORMAL
CREAT SERPL-MCNC: 0.95 MG/DL (ref 0.6–1.3)
EOSINOPHIL # BLD AUTO: 0.49 THOUSAND/ÂΜL (ref 0–0.61)
EOSINOPHIL NFR BLD AUTO: 6 % (ref 0–6)
ERYTHROCYTE [DISTWIDTH] IN BLOOD BY AUTOMATED COUNT: 16.3 % (ref 11.6–15.1)
FLUAV RNA RESP QL NAA+PROBE: NEGATIVE
FLUBV RNA RESP QL NAA+PROBE: NEGATIVE
GFR SERPL CREATININE-BSD FRML MDRD: 107 ML/MIN/1.73SQ M
GLUCOSE SERPL-MCNC: 115 MG/DL (ref 65–140)
GLUCOSE UR STRIP-MCNC: NEGATIVE MG/DL
HCT VFR BLD AUTO: 36.8 % (ref 36.5–49.3)
HGB BLD-MCNC: 11.1 G/DL (ref 12–17)
HGB UR QL STRIP.AUTO: NEGATIVE
IMM GRANULOCYTES # BLD AUTO: 0.02 THOUSAND/UL (ref 0–0.2)
IMM GRANULOCYTES NFR BLD AUTO: 0 % (ref 0–2)
KETONES UR STRIP-MCNC: NEGATIVE MG/DL
LEUKOCYTE ESTERASE UR QL STRIP: NEGATIVE
LIPASE SERPL-CCNC: 23 U/L (ref 11–82)
LYMPHOCYTES # BLD AUTO: 2.08 THOUSANDS/ÂΜL (ref 0.6–4.47)
LYMPHOCYTES NFR BLD AUTO: 27 % (ref 14–44)
MAGNESIUM SERPL-MCNC: 1.8 MG/DL (ref 1.9–2.7)
MCH RBC QN AUTO: 19 PG (ref 26.8–34.3)
MCHC RBC AUTO-ENTMCNC: 30.2 G/DL (ref 31.4–37.4)
MCV RBC AUTO: 63 FL (ref 82–98)
MONOCYTES # BLD AUTO: 0.88 THOUSAND/ÂΜL (ref 0.17–1.22)
MONOCYTES NFR BLD AUTO: 11 % (ref 4–12)
NEUTROPHILS # BLD AUTO: 4.14 THOUSANDS/ÂΜL (ref 1.85–7.62)
NEUTS SEG NFR BLD AUTO: 54 % (ref 43–75)
NITRITE UR QL STRIP: NEGATIVE
NRBC BLD AUTO-RTO: 0 /100 WBCS
PH UR STRIP.AUTO: 6 [PH]
PLATELET # BLD AUTO: 396 THOUSANDS/UL (ref 149–390)
PMV BLD AUTO: 8.9 FL (ref 8.9–12.7)
POTASSIUM SERPL-SCNC: 3.8 MMOL/L (ref 3.5–5.3)
PROCALCITONIN SERPL-MCNC: <0.05 NG/ML
PROT SERPL-MCNC: 7.2 G/DL (ref 6.4–8.4)
PROT UR STRIP-MCNC: NEGATIVE MG/DL
RBC # BLD AUTO: 5.85 MILLION/UL (ref 3.88–5.62)
RSV RNA RESP QL NAA+PROBE: NEGATIVE
SARS-COV-2 RNA RESP QL NAA+PROBE: NEGATIVE
SODIUM SERPL-SCNC: 136 MMOL/L (ref 135–147)
SP GR UR STRIP.AUTO: 1.01 (ref 1–1.03)
UROBILINOGEN UR QL STRIP.AUTO: 0.2 E.U./DL
WBC # BLD AUTO: 7.76 THOUSAND/UL (ref 4.31–10.16)

## 2023-03-15 RX ORDER — DICYCLOMINE HYDROCHLORIDE 10 MG/1
20 CAPSULE ORAL ONCE
Status: COMPLETED | OUTPATIENT
Start: 2023-03-15 | End: 2023-03-15

## 2023-03-15 RX ORDER — LOPERAMIDE HYDROCHLORIDE 2 MG/1
2 CAPSULE ORAL
Status: DISCONTINUED | OUTPATIENT
Start: 2023-03-15 | End: 2023-03-15 | Stop reason: HOSPADM

## 2023-03-15 RX ORDER — MAGNESIUM SULFATE 1 G/100ML
1 INJECTION INTRAVENOUS ONCE
Status: COMPLETED | OUTPATIENT
Start: 2023-03-15 | End: 2023-03-15

## 2023-03-15 RX ORDER — DULOXETIN HYDROCHLORIDE 60 MG/1
60 CAPSULE, DELAYED RELEASE ORAL DAILY
Status: DISCONTINUED | OUTPATIENT
Start: 2023-03-15 | End: 2023-03-15 | Stop reason: HOSPADM

## 2023-03-15 RX ORDER — ALPRAZOLAM 0.5 MG/1
0.5 TABLET ORAL 2 TIMES DAILY PRN
Status: DISCONTINUED | OUTPATIENT
Start: 2023-03-15 | End: 2023-03-15 | Stop reason: HOSPADM

## 2023-03-15 RX ORDER — HYDROMORPHONE HCL/PF 1 MG/ML
1 SYRINGE (ML) INJECTION ONCE
Status: COMPLETED | OUTPATIENT
Start: 2023-03-15 | End: 2023-03-15

## 2023-03-15 RX ORDER — ENOXAPARIN SODIUM 100 MG/ML
40 INJECTION SUBCUTANEOUS
Status: DISCONTINUED | OUTPATIENT
Start: 2023-03-15 | End: 2023-03-15 | Stop reason: HOSPADM

## 2023-03-15 RX ORDER — ONDANSETRON 2 MG/ML
4 INJECTION INTRAMUSCULAR; INTRAVENOUS ONCE
Status: COMPLETED | OUTPATIENT
Start: 2023-03-15 | End: 2023-03-15

## 2023-03-15 RX ORDER — FAMOTIDINE 10 MG/ML
20 INJECTION, SOLUTION INTRAVENOUS EVERY 12 HOURS SCHEDULED
Status: DISCONTINUED | OUTPATIENT
Start: 2023-03-15 | End: 2023-03-15

## 2023-03-15 RX ORDER — ONDANSETRON 2 MG/ML
4 INJECTION INTRAMUSCULAR; INTRAVENOUS EVERY 6 HOURS PRN
Status: DISCONTINUED | OUTPATIENT
Start: 2023-03-15 | End: 2023-03-15 | Stop reason: HOSPADM

## 2023-03-15 RX ORDER — ALPRAZOLAM 0.5 MG/1
0.5 TABLET ORAL ONCE
Status: COMPLETED | OUTPATIENT
Start: 2023-03-15 | End: 2023-03-15

## 2023-03-15 RX ORDER — HYDROMORPHONE HCL IN WATER/PF 6 MG/30 ML
0.2 PATIENT CONTROLLED ANALGESIA SYRINGE INTRAVENOUS
Status: DISCONTINUED | OUTPATIENT
Start: 2023-03-15 | End: 2023-03-15 | Stop reason: HOSPADM

## 2023-03-15 RX ORDER — FAMOTIDINE 20 MG/1
20 TABLET, FILM COATED ORAL 2 TIMES DAILY
Status: DISCONTINUED | OUTPATIENT
Start: 2023-03-15 | End: 2023-03-15 | Stop reason: HOSPADM

## 2023-03-15 RX ORDER — LOPERAMIDE HYDROCHLORIDE 2 MG/1
2 CAPSULE ORAL
Qty: 30 CAPSULE | Refills: 0 | Status: SHIPPED | OUTPATIENT
Start: 2023-03-16

## 2023-03-15 RX ORDER — HYDROMORPHONE HCL/PF 1 MG/ML
0.5 SYRINGE (ML) INJECTION
Status: DISCONTINUED | OUTPATIENT
Start: 2023-03-15 | End: 2023-03-15 | Stop reason: HOSPADM

## 2023-03-15 RX ORDER — PANTOPRAZOLE SODIUM 40 MG/1
40 TABLET, DELAYED RELEASE ORAL
Status: DISCONTINUED | OUTPATIENT
Start: 2023-03-16 | End: 2023-03-15

## 2023-03-15 RX ORDER — SODIUM CHLORIDE, SODIUM LACTATE, POTASSIUM CHLORIDE, CALCIUM CHLORIDE 600; 310; 30; 20 MG/100ML; MG/100ML; MG/100ML; MG/100ML
125 INJECTION, SOLUTION INTRAVENOUS CONTINUOUS
Status: DISCONTINUED | OUTPATIENT
Start: 2023-03-15 | End: 2023-03-15 | Stop reason: HOSPADM

## 2023-03-15 RX ORDER — ACETAMINOPHEN 325 MG/1
650 TABLET ORAL EVERY 6 HOURS PRN
Status: DISCONTINUED | OUTPATIENT
Start: 2023-03-15 | End: 2023-03-15 | Stop reason: HOSPADM

## 2023-03-15 RX ADMIN — HYDROMORPHONE HYDROCHLORIDE 0.5 MG: 1 INJECTION, SOLUTION INTRAMUSCULAR; INTRAVENOUS; SUBCUTANEOUS at 16:21

## 2023-03-15 RX ADMIN — SODIUM CHLORIDE, SODIUM LACTATE, POTASSIUM CHLORIDE, AND CALCIUM CHLORIDE 150 ML/HR: .6; .31; .03; .02 INJECTION, SOLUTION INTRAVENOUS at 01:34

## 2023-03-15 RX ADMIN — HYDROMORPHONE HYDROCHLORIDE 0.5 MG: 1 INJECTION, SOLUTION INTRAMUSCULAR; INTRAVENOUS; SUBCUTANEOUS at 05:11

## 2023-03-15 RX ADMIN — LOPERAMIDE HYDROCHLORIDE 2 MG: 2 CAPSULE ORAL at 17:46

## 2023-03-15 RX ADMIN — ONDANSETRON 4 MG: 2 INJECTION INTRAMUSCULAR; INTRAVENOUS at 05:11

## 2023-03-15 RX ADMIN — DICYCLOMINE HYDROCHLORIDE 20 MG: 10 CAPSULE ORAL at 01:13

## 2023-03-15 RX ADMIN — FAMOTIDINE 20 MG: 20 TABLET, FILM COATED ORAL at 17:46

## 2023-03-15 RX ADMIN — MAGNESIUM SULFATE HEPTAHYDRATE 1 G: 1 INJECTION, SOLUTION INTRAVENOUS at 05:58

## 2023-03-15 RX ADMIN — DULOXETINE HYDROCHLORIDE 60 MG: 60 CAPSULE, DELAYED RELEASE ORAL at 09:07

## 2023-03-15 RX ADMIN — ONDANSETRON 4 MG: 2 INJECTION INTRAMUSCULAR; INTRAVENOUS at 01:29

## 2023-03-15 RX ADMIN — SODIUM CHLORIDE, SODIUM LACTATE, POTASSIUM CHLORIDE, AND CALCIUM CHLORIDE 125 ML/HR: .6; .31; .03; .02 INJECTION, SOLUTION INTRAVENOUS at 16:28

## 2023-03-15 RX ADMIN — ENOXAPARIN SODIUM 40 MG: 40 INJECTION SUBCUTANEOUS at 09:06

## 2023-03-15 RX ADMIN — HYDROMORPHONE HYDROCHLORIDE 0.5 MG: 1 INJECTION, SOLUTION INTRAMUSCULAR; INTRAVENOUS; SUBCUTANEOUS at 09:05

## 2023-03-15 RX ADMIN — HYDROMORPHONE HYDROCHLORIDE 0.5 MG: 1 INJECTION, SOLUTION INTRAMUSCULAR; INTRAVENOUS; SUBCUTANEOUS at 12:38

## 2023-03-15 RX ADMIN — FAMOTIDINE 20 MG: 10 INJECTION, SOLUTION INTRAVENOUS at 03:06

## 2023-03-15 RX ADMIN — HYDROMORPHONE HYDROCHLORIDE 1 MG: 1 INJECTION, SOLUTION INTRAMUSCULAR; INTRAVENOUS; SUBCUTANEOUS at 01:23

## 2023-03-15 RX ADMIN — ALPRAZOLAM 0.5 MG: 0.5 TABLET ORAL at 03:06

## 2023-03-15 NOTE — DISCHARGE SUMMARY
Julian 43  Discharge- Brennen Saliva 1995, 32 y o  male MRN: [id number]  Unit/Bed#: [address] [place] [address] Encounter: [id number]  Primary Care Provider: Zunilda Daley DO   Date and time admitted to hospital: 3/15/2023 12:48 AM    * Epigastric abdominal pain  Assessment & Plan  Improved,    Patient presents with epigastric pain and pain/swelling around ileostomy stoma started around 830 AM yesterday morning,with associated nausea vomiting about 6 times, did not eat or drink much at all  Reports started taking Imodium for chronic liquidy diarrhea about 7 times a day for past week as instructed by his surgeon, yesterday morning stool was thick and he noticed pain and swelling around ileostomy stoma  · Of note, patient was seen in ED earlier during the day for above complaints, underwent CT abdomen pelvis with IV contrast which showed no acute findings  Patient was discharged but returned in about 2 hours due to severe abdominal pain  · Patient has frequent hospitalizations due to abdominal pain nausea vomiting, patient was just discharged about a week ago  · History of UC, status post total 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 stricture with ileostomy creation in October 2022  Patient follows colorectal surgery in [facility]  Reports his surgeon is going to change his pouch in April  · Lactic acid normal/ADA T  · GI prophylaxis  · Pain control  Minimize narcotic  Declined Toradol IV due to pending surgery in April  · IV hydration  · GI: Stool studies, follow-up [facility] Flip    SIRS (systemic inflammatory response syndrome) ([facility] [address] )  Assessment & Plan  · Patient meets SIRS criteria from earlier blood work  Repeat blood work showed a normal white count  · Patient afebrile  CT no acute findings      History of ulcerative colitis  Assessment & Plan  · As above    Anemia  Assessment & Plan  Baseline hemoglobin appears to be 10-11s  · Hemoglobin stable  · Recommend MVI on discharge  · Monitor    Ankylosing spondylitis [facility])  Assessment & Plan  · Patient sees rheumatologist as outpatient  · Monitor for low back pain     CRA (generalized anxiety disorder)  Assessment & Plan  · Continue Cymbalta, Xanax as needed  · Unable to access [facility] website at this time        Medical Problems     Resolved Problems  Date Reviewed: 3/15/2023   None       Discharging Physician / Practitioner: Radha Kate MD  PCP: Lizbeth Alex DO  Admission Date:   Admission Orders (From admission, onward)     Ordered        03/15/23 0125  Place in Observation  Once                      Discharge Date: 03/15/23    Consultations During Hospital Stay:  · Gi     Procedures Performed:   · N/A    Significant Findings / Test Results:   · N/A    Incidental Findings:   · N/A      Test Results Pending at Discharge (will require follow up):   · N/A     Outpatient Tests Requested:  · N/A    Complications: N/A    Reason for Admission: Abdominal pain with GI distress    Hospital Course:   Xiomara Thomas is a 32 y o  male patient who originally presented to the hospital on 3/15/2023 due to abdominal pain and GI distress  Patient reported 1 day of associated high output in ostomy and vomiting  Patient reports ported taking Imodium for 7 days without resolution and noticed swelling around ostomy site PTA  Patient seen and assessed by GI with stool studies in place and referred back to [facility]  Patient's symptoms of vomiting resolved same day and improvement in ostomy output  Patient has high readmission due to high output in ostomy, pending [facility] Fl[facility] ostomy revision in April  All other chronic conditions controlled with inpatient medicine equivalent  The patient, initially admitted to the hospital as inpatient, was discharged earlier than expected given the following: Improvement in symptoms      Please see above list of diagnoses and related plan for additional information  Condition at Discharge: fair    Discharge Day Visit / Exam:   Subjective: Patient seen and examined at bedside, reported that he feels his out put may have been secondary to food poisoning since his whole family has been vomiting and having diarrhea for 1 day  Patient denied any more vomiting since admitted  Patient reported that he did not have pain meds given by Oleg Venegas at this time but will get it after ostomy revision  Vitals: Blood Pressure: 108/59 (03/15/23 1500)  Pulse: 89 (03/15/23 1500)  Temperature: 98 °F (36 7 °C) (03/15/23 1500)  Temp Source: Oral (03/15/23 1500)  Respirations: 18 (03/15/23 1500)  SpO2: 98 % (03/15/23 1500)     Exam:   Physical Exam  Vitals and nursing note reviewed  Constitutional:       General: He is not in acute distress  Appearance: He is well-developed  HENT:      Head: Normocephalic and atraumatic  Eyes:      Conjunctiva/sclera: Conjunctivae normal    Cardiovascular:      Rate and Rhythm: Normal rate and regular rhythm  Heart sounds: No murmur heard  Pulmonary:      Effort: Pulmonary effort is normal  No respiratory distress  Breath sounds: Normal breath sounds  No wheezing  Abdominal:      Palpations: Abdomen is soft  Tenderness: There is no abdominal tenderness  Comments: Ostomy right-sided, CDI no erythema, nontender   Musculoskeletal:         General: No swelling  Cervical back: Neck supple  Right lower leg: No edema  Left lower leg: No edema  Skin:     General: Skin is warm and dry  Capillary Refill: Capillary refill takes less than 2 seconds  Neurological:      Mental Status: He is alert and oriented to person, place, and time  Psychiatric:         Mood and Affect: Mood normal          Behavior: Behavior normal           Discussion with Family: Patient declined call to        Discharge instructions/Information to patient and family:   See after visit summary for information provided to patient and family  Provisions for Follow-Up Care:  See after visit summary for information related to follow-up care and any pertinent home health orders  Disposition:   Home    Planned Readmission: No     Discharge Statement:  I spent 45 minutes discharging the patient  This time was spent on the day of discharge  I had direct contact with the patient on the day of discharge  Greater than 50% of the total time was spent examining patient, answering all patient questions, arranging and discussing plan of care with patient as well as directly providing post-discharge instructions  Additional time then spent on discharge activities  Discharge Medications:  See after visit summary for reconciled discharge medications provided to patient and/or family        **Please Note: This note may have been constructed using a voice recognition system**

## 2023-03-15 NOTE — H&P
Stephanie 47 1995, 32 y o  male MRN: [id number]  Unit/Bed#: [address] [place] [address] Encounter: [id number]  Primary Care Provider: Bryson Reddy DO   Date and time admitted to hospital: 3/15/2023 12:48 AM    * Epigastric abdominal pain  Assessment & Plan  Patient presents with epigastric pain and pain/swelling around ileostomy stoma started around 830 AM yesterday morning,with associated nausea vomiting about 6 times, did not eat or drink much at all  Reports started taking Imodium for chronic liquidy diarrhea about 7 times a day for past week as instructed by his surgeon, yesterday morning stool was thick and he noticed pain and swelling around ileostomy stoma  · Of note, patient was seen in ED earlier during the day for above complaints, underwent CT abdomen pelvis with IV contrast which showed no acute findings  Patient was discharged but returned in about 2 hours due to severe abdominal pain  · Patient has frequent hospitalizations due to abdominal pain nausea vomiting, patient was just discharged about a week ago  · History of UC, status post total 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 stricture with ileostomy creation in October 2022  Patient follows colorectal surgery in [facility]  Reports his surgeon is going to change his pouch in April  · Lactic acid normal  · Keep patient n p o   · GI prophylaxis  · Pain control  Minimize narcotic  Declined Toradol IV due to pending surgery in April  · IV hydration  · Consult GI    SIRS (systemic inflammatory response syndrome) ([facility] )  Assessment & Plan  · Patient meets SIRS criteria from earlier blood work  Repeat blood work showed a normal white count  · Patient afebrile  CT no acute findings      History of ulcerative colitis  Assessment & Plan  · As above    Anemia  Assessment & Plan  · Baseline hemoglobin appears to be 10-11s  · Hemoglobin stable  · Recommend MVI on discharge  · Monitor    Ankylosing spondylitis [facility])  Assessment & Plan  · Patient sees rheumatologist as outpatient  · Monitor for low back pain     CAR (generalized anxiety disorder)  Assessment & Plan  · Continue Cymbalta, Xanax as needed  · Unable to access PDMP website at this time      VTE Prophylaxis: Enoxaparin (Lovenox)  / reason for no mechanical VTE prophylaxis on Lovenox   Code Status: full code  POLST: POLST form is not discussed and not completed at this time  Anticipated Length of Stay:  Patient will be admitted on an Observation basis with an anticipated length of stay of  < 2 midnights  Justification for Hospital Stay: Abdominal pain    Total Time for Visit, including Counseling / Coordination of Care: 45 minutes  Greater than 50% of this total time spent on direct patient counseling and coordination of care  Chief Complaint:   Abdominal pain, nausea vomiting, pain/ swelling around stoma    History of Present Illness:    Beto Maradiaga is a 32 y o  male with PMH of UC, extensive surgery due to UC, ankylosing spondylitis, anemia, anxiety who presents with  epigastric pain and pain/swelling around ileostomy stoma started around 830 AM yesterday morning,with associated nausea vomiting about 6 times, did not eat or drink much at all  Reports started taking Imodium for chronic liquidy diarrhea about 7 times a day for past week as instructed by his surgeon, yesterday morning stool was thick and he noticed pain and swelling around ileostomy stoma  Patient denies chest pain, headache, dizziness, SOB, fever, chills  Reports minimal abdominal pain currently  Review of Systems:    Review of Systems   Constitutional: Positive for appetite change  Gastrointestinal: Positive for abdominal pain, nausea and vomiting  Thick stool from ileostomy, pain and swelling around stoma  Epigastric pain  Genitourinary: Positive for decreased urine volume     All other systems reviewed and are negative  Past Medical and Surgical History:     Past Medical History:   Diagnosis Date   • Ankylosing spondylitis ([facility] )    • Anxiety    • Bowel obstruction ([facility] )    • Clostridium difficile colitis 9/13/2018   • Colitis    • 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[facility] ) 9/13/2018   • Pancreatitis    • Ulcerative colitis ([facility] )        Past Surgical History:   Procedure Laterality Date   • COLECTOMY TOTAL      with ileal pouch and anastemosis   • IR PICC PLACEMENT SINGLE LUMEN  3/1/2022   • TOTAL COLECTOMY         Meds/Allergies:    Prior to Admission medications    Medication Sig Start Date End Date Taking? Authorizing Provider   DULoxetine (CYMBALTA) 60 mg delayed release capsule Take 1 capsule (60 mg total) by mouth daily 2/7/23  Yes Juliane Luciano, DO   acetaminophen (TYLENOL) 325 mg tablet Take 2 tablets (650 mg total) by mouth every 6 (six) hours as needed for mild pain, headaches or fever 1/24/23   Darlin Martinez, DO   ALPRAZolam (XANAX) 0 5 mg tablet 1-2 po q8hrs prn anxiety 2/7/23   Juliane Luciano, DO   ondansetron (Zofran ODT) 4 mg disintegrating tablet Take 1 tablet (4 mg total) by mouth every 6 (six) hours as needed for nausea or vomiting 3/14/23   Maryjane Hilliard MD   ondansetron (ZOFRAN-ODT) 4 mg disintegrating tablet Take 1 tablet (4 mg total) by mouth every 6 (six) hours as needed for nausea for up to 3 days 1/18/23 2/16/23  Cassie King,    cholestyramine sugar free (QUESTRAN LIGHT) 4 g packet Take 1 packet (4 g total) by mouth 2 (two) times a day  Patient not taking: Reported on 3/15/2023 3/6/23 3/15/23  Geneva Shultz MD     I have reviewed home medications with patient personally  Allergies:    Allergies   Allergen Reactions   • Cefazolin Hives     Other reaction(s): Arrythmia (Abnormal Heartbeat), Rash Maculopapular   • Mesalamine GI Intolerance     Other reaction(s): Pancreatitis  [facility] - [id number]: pancreatitis   • Wound Dressings Rash     [employer] ostomy Products        Social History: Marital Status: Single   Occupation:   Patient Pre-hospital Living Situation: Family  Patient Pre-hospital Level of Mobility: Independent  Patient Pre-hospital Diet Restrictions: Regular  Substance Use History:   Social History     Substance and Sexual Activity   Alcohol Use Not Currently    Comment: pt states 5 a month/socially     Social History     Tobacco Use   Smoking Status Never   Smokeless Tobacco Never     Social History     Substance and Sexual Activity   Drug Use No       Family History:    non-contributory    Physical Exam:     Vitals:   Blood Pressure: 119/75 (03/15/23 0255)  Pulse: 77 (03/15/23 0255)  Temperature: 97 7 °F (36 5 °C) (03/15/23 0255)  Temp Source: Oral (03/15/23 0255)  Respirations: 16 (03/15/23 0255)  SpO2: 100 % (03/15/23 0255)    Physical Exam  Vitals and nursing note reviewed  Constitutional:       Appearance: He is well-developed  Comments: Appears in pain and uncomfortable   HENT:      Head: Normocephalic and atraumatic  Neck:      Thyroid: No thyromegaly  Vascular: No JVD  Trachea: No tracheal deviation  Cardiovascular:      Rate and Rhythm: Regular rhythm  Tachycardia present  Heart sounds: Normal heart sounds  Pulmonary:      Effort: Pulmonary effort is normal  No respiratory distress  Breath sounds: Normal breath sounds  No wheezing or rales  Abdominal:      General: Bowel sounds are normal  There is no distension  Palpations: Abdomen is soft  Tenderness: There is abdominal tenderness  There is no guarding  Comments: Epigastric tender  Right ileostomy in situ  Musculoskeletal:         General: No swelling or deformity  Cervical back: Neck supple  Right lower leg: No edema  Left lower leg: No edema  Skin:     General: Skin is warm and dry  Neurological:      General: No focal deficit present  Mental Status: He is alert and oriented to person, place, and time     Psychiatric:         Mood and Affect: Mood normal          Judgment: Judgment normal          Additional Data:     Lab Results: I have personally reviewed pertinent reports  Results from last 7 days   Lab Units 03/15/23  0127   WBC Thousand/uL 7 76   HEMOGLOBIN g/dL 11 1*   HEMATOCRIT % 36 8   PLATELETS Thousands/uL 396*   NEUTROS PCT % 54   LYMPHS PCT % 27   MONOS PCT % 11   EOS PCT % 6     Results from last 7 days   Lab Units 03/15/23  0127   POTASSIUM mmol/L 3 8   CHLORIDE mmol/L 102   CO2 mmol/L 24   BUN mg/dL 10   CREATININE mg/dL 0 95   CALCIUM mg/dL 9 0   ALK PHOS U/L 80   ALT U/L 39   AST U/L 24           Imaging: I have personally reviewed pertinent reports  XR chest portable    Result Date: 2/17/2023  Narrative: CHEST INDICATION:   fever, sirs  COMPARISON:  1/25/2023 1/18/2023 EXAM PERFORMED/VIEWS:  XR CHEST PORTABLE FINDINGS: Cardiomediastinal silhouette appears unremarkable  The lungs are clear  No pneumothorax or pleural effusion  Osseous structures appear within normal limits for patient age  Impression: No acute cardiopulmonary disease  Workstation performed: [id number]     XR abdomen obstruction series    Result Date: 3/14/2023  Narrative: OBSTRUCTION SERIES INDICATION:   pain  Prior history of colectomy  COMPARISON:  2/27/2023  CT abdomen and pelvis 2/26/2023 EXAM PERFORMED/VIEWS:  XR ABDOMEN OBSTRUCTION SERIES FINDINGS: Subtle prominence mildly dilated loops of small bowel are seen primarily within the left hemiabdomen  Right lower quadrant ileostomy is noted  Surgical suture material seen projecting within the pelvis  No free air identified beneath the hemidiaphragms  No pathologic calcifications or soft tissue masses evident  No evidence of acute osseous abnormality  Examination of the chest reveals a normal cardiomediastinal silhouette  Lungs are clear  Impression: 1  Multiple prominent to mildly dilated loops of small bowel primarily distributed within the left hemiabdomen    Findings may represent ileus versus developing small bowel obstruction  Further characterization with CT abdomen and pelvis is recommended  The study was marked in [facility] for immediate notification  Workstation performed: [id number]     XR abdomen obstruction series    Result Date: 2/27/2023  Narrative: OBSTRUCTION SERIES INDICATION:   abd pain/distention/vomiting  COMPARISON:  CT 2/26/2023 EXAM PERFORMED/VIEWS:  XR ABDOMEN OBSTRUCTION SERIES FINDINGS: There is a nonobstructive bowel gas pattern  Persistent gastric distention  Right lower abdomen ostomy, multiple pelvic surgical wire staples and posterior bowel gas again noted  No free air beneath the hemidiaphragms  No pathologic calcifications or soft tissue masses evident  Osseous structures are unremarkable  Examination of the chest reveals a normal cardiomediastinal silhouette  Lungs are clear  Impression: Nonobstructive bowel gas pattern  Workstation performed: [id number]     CT abdomen pelvis with contrast    Result Date: 3/14/2023  Narrative: CT ABDOMEN AND PELVIS WITH IV CONTRAST INDICATION:   Abdominal pain, acute, nonlocalized abdominal pain,vomiting  "States pain lower abdomen and vomiting every 15 minutes  States pain started at 0830  Patient requested at registration to be seen by Dr Vonda Fuller  " COMPARISON:  CT abdomen pelvis 2/26/2023  TECHNIQUE:  CT examination of the abdomen and pelvis was performed  Axial, sagittal, and coronal 2D reformatted images were created from the source data and submitted for interpretation  Radiation dose length product (DLP) for this visit:  468 mGy-cm   This examination, like all CT scans performed in the [facility], was performed utilizing techniques to minimize radiation dose exposure, including the use of iterative reconstruction and automated exposure control  IV Contrast:  100 mL of iohexol (OMNIPAQUE) Enteric Contrast:  Enteric contrast was not administered   FINDINGS: ABDOMEN LOWER CHEST:  No clinically significant abnormality identified in the visualized lower chest  LIVER/BILIARY TREE:  Unremarkable  GALLBLADDER:  No calcified gallstones  No pericholecystic inflammatory change  SPLEEN:  Unremarkable  PANCREAS:  Unremarkable  ADRENAL GLANDS:  Unremarkable  KIDNEYS/URETERS:  Unremarkable  No hydronephrosis  STOMACH AND BOWEL:  Status post colectomy  J-pouch extends from the right lower quadrant to the rectum with persistent wall thickening similar to the comparison study  APPENDIX:  No findings to suggest appendicitis  ABDOMINOPELVIC CAVITY:  No ascites  No pneumoperitoneum  No lymphadenopathy  VESSELS:  Unremarkable for patient's age  PELVIS REPRODUCTIVE ORGANS:  Unremarkable for patient's age  URINARY BLADDER:  Decompressed  ABDOMINAL WALL/INGUINAL REGIONS:  Right lower quadrant ileostomy  No bowel obstruction  The oral contrast column traverses the ostomy site  OSSEOUS STRUCTURES:  No acute fracture or destructive osseous lesion  Impression: No new findings  Chronic J-pouch thickening similar appearance to the comparison study from 2/26/2023  Workstation performed: [id number]     CT abdomen pelvis with contrast    Result Date: 2/26/2023  Narrative: CT ABDOMEN AND PELVIS WITH IV CONTRAST INDICATION:   abd pain and vomiting  COMPARISON:  2/16/2023  TECHNIQUE:  CT examination of the abdomen and pelvis was performed  Axial, sagittal, and coronal 2D reformatted images were created from the source data and submitted for interpretation  Radiation dose length product (DLP) for this visit:  715 mGy-cm   This examination, like all CT scans performed in the [facility], was performed utilizing techniques to minimize radiation dose exposure, including the use of iterative reconstruction and automated exposure control  IV Contrast:  100 mL of iohexol (OMNIPAQUE) 50 mL of iohexol (OMNIPAQUE) Enteric Contrast:  Enteric contrast was administered   FINDINGS: ABDOMEN LOWER CHEST:  No clinically significant abnormality identified in the visualized lower chest  LIVER/BILIARY TREE:  Unremarkable  GALLBLADDER:  No calcified gallstones  No pericholecystic inflammatory change  SPLEEN:  Unremarkable  PANCREAS:  Unremarkable  ADRENAL GLANDS:  Unremarkable  KIDNEYS/URETERS:  Unremarkable  No hydronephrosis  STOMACH AND BOWEL:  Post colectomy  J-pouch is seen extending from the right lower quadrant to the rectum with similar appearance of mural thickening comparison to the prior exams  APPENDIX:  Not visualized ABDOMINOPELVIC CAVITY:  No ascites  No pneumoperitoneum  No lymphadenopathy  VESSELS:  Unremarkable for patient's age  PELVIS REPRODUCTIVE ORGANS:  Unremarkable for patient's age  URINARY BLADDER:  Unremarkable  ABDOMINAL WALL/INGUINAL REGIONS:  Right mid abdomen enterostomy    OSSEOUS STRUCTURES:  No acute fracture or destructive osseous lesion  Impression: Postsurgical changes above  Post colectomy  J-pouch is seen extending from the right lower quadrant to the rectum with similar appearance of mural thickening comparison to the prior exams  No other potential acute pathology visualized on CT of the abdomen and pelvis with IV with oral contrast  Workstation performed: [id number]     CT abdomen pelvis with contrast    Result Date: 2/16/2023  Narrative: CT ABDOMEN AND PELVIS WITH IV CONTRAST INDICATION:   Abdominal abscess/infection suspected Intra-abdominal abscess History of ulcerative colitis, intra-abdominal abscess  COMPARISON:  CT of the chest abdomen pelvis on February 10, 2023  TECHNIQUE:  CT examination of the abdomen and pelvis was performed  Axial, sagittal, and coronal 2D reformatted images were created from the source data and submitted for interpretation  Radiation dose length product (DLP) for this visit:  470 77 mGy-cm     This examination, like all CT scans performed in the [facility], was performed utilizing techniques to minimize radiation dose exposure, including the use of iterative  reconstruction and automated exposure control  IV Contrast:  30 mL of iohexol (OMNIPAQUE) 100 mL of iohexol (OMNIPAQUE) Enteric Contrast:  Enteric contrast was not administered  FINDINGS: ABDOMEN LOWER CHEST:  No clinically significant abnormality identified in the visualized lower chest  LIVER/BILIARY TREE:  Unremarkable  GALLBLADDER:  No calcified gallstones  No pericholecystic inflammatory change  SPLEEN:  Unremarkable  PANCREAS:  Unremarkable  ADRENAL GLANDS:  Unremarkable  KIDNEYS/URETERS:  Unremarkable  No hydronephrosis  STOMACH AND BOWEL:  Status post colectomy  Enteric contrast reaches the level of the right lower quadrant ileostomy without evidence of obstruction  The J pouch extending from the right lower quadrant to the rectum demonstrates a moderate amount of fluid within the lumen and mild wall thickening similar to prior examination  APPENDIX:  Absent ABDOMINOPELVIC CAVITY:  No ascites  No pneumoperitoneum  No lymphadenopathy  VESSELS:  Unremarkable for patient's age  PELVIS REPRODUCTIVE ORGANS:  Unremarkable for patient's age  URINARY BLADDER:  Unremarkable  ABDOMINAL WALL/INGUINAL REGIONS:  Unremarkable  OSSEOUS STRUCTURES:  No acute fracture or destructive osseous lesion  Impression: 1  Enteric contrast reaches the level the right lower quadrant ileostomy without evidence of obstruction  2   Redemonstrated mild wall thickening and fluid within the J-pouch similar to prior examination  Workstation performed: [id number]       EKG, Pathology, and Other Studies Reviewed on Admission:   · EKG: NA    [employer] Records Reviewed: Yes     ** Please Note: Dragon 360 Dictation voice to text software may have been used in the creation of this document   **

## 2023-03-15 NOTE — ASSESSMENT & PLAN NOTE
Improved,    Patient presents with epigastric pain and pain/swelling around ileostomy stoma started around 830 AM yesterday morning,with associated nausea vomiting about 6 times, did not eat or drink much at all  Reports started taking Imodium for chronic liquidy diarrhea about 7 times a day for past week as instructed by his surgeon, yesterday morning stool was thick and he noticed pain and swelling around ileostomy stoma  · Of note, patient was seen in ED earlier during the day for above complaints, underwent CT abdomen pelvis with IV contrast which showed no acute findings  Patient was discharged but returned in about 2 hours due to severe abdominal pain  · Patient has frequent hospitalizations due to abdominal pain nausea vomiting, patient was just discharged about a week ago  · History of UC, status post total 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 stricture with ileostomy creation in October 2022  Patient follows colorectal surgery in [facility]  Reports his surgeon is going to change his pouch in April  · Lactic acid normal/ADA T  · GI prophylaxis  · Pain control  Minimize narcotic  Declined Toradol IV due to pending surgery in April    · IV hydration  · GI: Stool studies, follow-up [address] Dr

## 2023-03-15 NOTE — ED PROVIDER NOTES
History  Chief Complaint   Patient presents with   • Ostomy Problem     Patient was just discharged a few hours ago from ED  Patient returned to ED with extreme abdominal pain and very thick output from ostomy  32year-old male with past medical history of ulcerative colitis status post hemicolectomy with colostomy in place, DVT after hemicolectomy surgery, pancreatitis, multiple episodes of SBO chronic surgery, multiple adhesions noted in the abdomen, presents to the ER with complaint of severe abdominal pain as well as high mucus stool output in colostomy bag  Patient was seen in our emergency department earlier this evening for similar abdominal pain  Pain was controlled in the ED  Lab and radiology results were at baseline  Patient was discharged home with pain medication and follow-up to his colorectal surgeon  Patient went home for about 2 hours and started to have worsening pain so he came back  Patient denies any fevers or chills  History provided by:  Patient   used: No        Prior to Admission Medications   Prescriptions Last Dose Informant Patient Reported? Taking?    ALPRAZolam (XANAX) 0 5 mg tablet   No No   Si-2 po q8hrs prn anxiety   DULoxetine (CYMBALTA) 60 mg delayed release capsule 3/14/2023  No Yes   Sig: Take 1 capsule (60 mg total) by mouth daily   acetaminophen (TYLENOL) 325 mg tablet   No No   Sig: Take 2 tablets (650 mg total) by mouth every 6 (six) hours as needed for mild pain, headaches or fever   ondansetron (ZOFRAN-ODT) 4 mg disintegrating tablet   No No   Sig: Take 1 tablet (4 mg total) by mouth every 6 (six) hours as needed for nausea for up to 3 days   ondansetron (Zofran ODT) 4 mg disintegrating tablet   No No   Sig: Take 1 tablet (4 mg total) by mouth every 6 (six) hours as needed for nausea or vomiting      Facility-Administered Medications: None       Past Medical History:   Diagnosis Date   • Ankylosing spondylitis (HCC)    • Anxiety    • Bowel obstruction (HCC)    • Clostridium difficile colitis 9/13/2018   • Colitis    • 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[facility] [address] ) 9/13/2018   • Pancreatitis    • Ulcerative colitis ([facility] [address] )        Past Surgical History:   Procedure Laterality Date   • COLECTOMY TOTAL      with ileal pouch and anastemosis   • IR PICC PLACEMENT SINGLE LUMEN  3/1/2022   • TOTAL COLECTOMY         Family History   Problem Relation Age of Onset   • Lung disease Neg Hx      I have reviewed and agree with the history as documented  E-Cigarette/Vaping   • E-Cigarette Use Never User      E-Cigarette/Vaping Substances   • Nicotine No    • THC No    • CBD No    • Flavoring No    • Other No    • Unknown No      Social History     Tobacco Use   • Smoking status: Never   • Smokeless tobacco: Never   Vaping Use   • Vaping Use: Never used   Substance Use Topics   • Alcohol use: Not Currently     Comment: pt states 5 a month/socially   • Drug use: No       Review of Systems   Constitutional: Negative for activity change, fatigue and fever  HENT: Negative for congestion, ear discharge and sore throat  Eyes: Negative for pain and redness  Respiratory: Negative for cough, chest tightness, shortness of breath and wheezing  Cardiovascular: Negative for chest pain  Gastrointestinal: Positive for abdominal pain  Negative for diarrhea, nausea and vomiting  Endocrine: Negative for cold intolerance  Genitourinary: Negative for dysuria and urgency  Musculoskeletal: Negative for arthralgias and back pain  Neurological: Negative for dizziness, weakness and headaches  Psychiatric/Behavioral: Negative for agitation and behavioral problems  Physical Exam  Physical Exam  Vitals and nursing note reviewed  Constitutional:       Appearance: He is well-developed  HENT:      Head: Normocephalic and atraumatic  Nose: Nose normal    Eyes:      Conjunctiva/sclera: Conjunctivae normal    Cardiovascular:      Rate and Rhythm: Normal rate and regular rhythm  Heart sounds: Normal heart sounds  Pulmonary:      Effort: Pulmonary effort is normal       Breath sounds: Normal breath sounds  Abdominal:      General: Bowel sounds are normal  There is no distension  Palpations: Abdomen is soft  Tenderness: There is no abdominal tenderness  Comments: Soft, nondistended, with bowel sound present to all 4 quadrants  Mucus stool noted in colostomy bag  Generalized diffuse tenderness noted to palpation of abdomen  Musculoskeletal:         General: Normal range of motion  Cervical back: Normal range of motion and neck supple  Skin:     General: Skin is warm  Neurological:      General: No focal deficit present  Mental Status: He is alert and oriented to person, place, and time  Psychiatric:         Mood and Affect: Mood normal          Behavior: Behavior normal          Thought Content:  Thought content normal          Judgment: Judgment normal          Vital Signs  ED Triage Vitals [03/15/23 0058]   Temperature Pulse Respirations Blood Pressure SpO2   98 °F (36 7 °C) (!) 109 20 (!) 141/110 98 %      Temp Source Heart Rate Source Patient Position - Orthostatic VS BP Location FiO2 (%)   Oral Monitor Lying Right arm --      Pain Score       9           Vitals:    03/15/23 0058 03/15/23 0231 03/15/23 0255   BP: (!) 141/110 137/86 119/75   Pulse: (!) 109 83 77   Patient Position - Orthostatic VS: Lying Lying Lying         Visual Acuity      ED Medications  Medications   lactated ringers infusion (125 mL/hr Intravenous Rate/Dose Change 3/15/23 0258)   ALPRAZolam (XANAX) tablet 0 5 mg (has no administration in time range)   DULoxetine (CYMBALTA) delayed release capsule 60 mg (has no administration in time range)   acetaminophen (TYLENOL) tablet 650 mg (has no administration in time range)   ondansetron (ZOFRAN) injection 4 mg (has no administration in time range)   Famotidine (PF) (PEPCID) injection 20 mg (has no administration in time range) HYDROmorphone HCl (DILAUDID) injection 0 2 mg (has no administration in time range)     Or   HYDROmorphone (DILAUDID) injection 0 5 mg (has no administration in time range)   ALPRAZolam (XANAX) tablet 0 5 mg (has no administration in time range)   ondansetron (ZOFRAN) injection 4 mg (4 mg Intravenous Given 3/15/23 0129)   HYDROmorphone (DILAUDID) injection 1 mg (1 mg Intravenous Given 3/15/23 0123)   dicyclomine (BENTYL) capsule 20 mg (20 mg Oral Given 3/15/23 0113)       Diagnostic Studies  Results Reviewed     Procedure Component Value Units Date/Time    FLU/RSV/COVID - if FLU/RSV clinically relevant [[id number]]  (Normal) Collected: 03/15/23 0128    Lab Status: Final result Specimen: Nares from Nose Updated: 03/15/23 0222     SARS-CoV-2 Negative     INFLUENZA A PCR Negative     INFLUENZA B PCR Negative     RSV PCR Negative    Narrative:      FOR PEDIATRIC PATIENTS - copy/paste COVID Guidelines URL to browser: [email or web address]  [employer]    SARS-CoV-2 assay is a Nucleic Acid Amplification assay intended for the  qualitative detection of nucleic acid from SARS-CoV-2 in nasopharyngeal  swabs  Results are for the presumptive identification of SARS-CoV-2 RNA  Positive results are indicative of infection with SARS-CoV-2, the virus  causing COVID-19, but do not rule out bacterial infection or co-infection  with other viruses  Laboratories within the [place] and its  territories are required to report all positive results to the appropriate  public health authorities  Negative results do not preclude SARS-CoV-2  infection and should not be used as the sole basis for treatment or other  patient management decisions  Negative results must be combined with  clinical observations, patient history, and epidemiological information  This test has not been FDA cleared or approved  This test has been authorized by FDA under an Emergency Use Authorization  (EUA)   This test is only authorized for the duration of time the  declaration that circumstances exist justifying the authorization of the  emergency use of an in vitro diagnostic tests for detection of SARS-CoV-2  virus and/or diagnosis of COVID-19 infection under section 564(b)(1) of  the Act, 21 U  S C  879HXL-4(A)(0), unless the authorization is terminated  or revoked sooner  The test has been validated but independent review by FDA  and CLIA is pending  Test performed using [employer] GeneXpert: This RT-PCR assay targets N2,  a region unique to SARS-CoV-2  A conserved region in the E-gene was chosen  for pan-Sarbecovirus detection which includes SARS-CoV-2  According to CMS-2020-01-R, this platform meets the definition of high-throughput technology  Procalcitonin [[id number]]     Lab Status: No result Specimen: Blood     CBC and differential [[id number]]  (Abnormal) Collected: 03/15/23 0127    Lab Status: Final result Specimen: Blood from Arm, Right Updated: 03/15/23 0146     WBC 7 76 Thousand/uL      RBC 5 85 Million/uL      Hemoglobin 11 1 g/dL      Hematocrit 36 8 %      MCV 63 fL      MCH 19 0 pg      MCHC 30 2 g/dL      RDW 16 3 %      MPV 8 9 fL      Platelets 277 Thousands/uL      nRBC 0 /100 WBCs      Neutrophils Relative 54 %      Immat GRANS % 0 %      Lymphocytes Relative 27 %      Monocytes Relative 11 %      Eosinophils Relative 6 %      Basophils Relative 2 %      Neutrophils Absolute 4 14 Thousands/µL      Immature Grans Absolute 0 02 Thousand/uL      Lymphocytes Absolute 2 08 Thousands/µL      Monocytes Absolute 0 88 Thousand/µL      Eosinophils Absolute 0 49 Thousand/µL      Basophils Absolute 0 15 Thousands/µL     Comprehensive metabolic panel [[id number]] Collected: 03/15/23 0127    Lab Status: In process Specimen: Blood from Arm, Right Updated: 03/15/23 0144    Magnesium [[id number]] Collected: 03/15/23 0127    Lab Status:  In process Specimen: Blood from Arm, Right Updated: 03/15/23 0144    Lipase [[id number]] Collected: 03/15/23 0127    Lab Status: In process Specimen: Blood from Arm, Right Updated: 03/15/23 0144    UA w Reflex to Microscopic w Reflex to Culture [[id number]]     Lab Status: No result Specimen: Urine                  No orders to display              Procedures  Procedures         ED Course  ED Course as of 03/15/23 0259   Wed Mar 15, 2023   0045 Patient was just discharged a few hours ago  Pt came back for abdominal pain  Pt will be admitted  Medical Decision Making  Patient presented to the ED with intractable abdominal pain  Patient required IV narcotics as well as Xanax for his symptom control  This is his third emergency room visit in the past 2 days  His colostomy bag broke on route to the ED  Colostomy bag was changed in the emergency department  At this time patient will be admitted for further evaluation and management of his pain  Patient agrees with admission plans  Amount and/or Complexity of Data Reviewed  External Data Reviewed: labs and radiology  Details: Reviewed lab and radiology results from patient's last ED visit earlier today  Labs: ordered  Decision-making details documented in ED Course  Risk  Prescription drug management  Decision regarding hospitalization            Disposition  Final diagnoses:   Intractable abdominal pain     Time reflects when diagnosis was documented in both MDM as applicable and the Disposition within this note     Time User Action Codes Description Comment    3/15/2023  1:25 AM Cristhian Nazia, Phan Pedraza Add [R10 9] Intractable abdominal pain     3/15/2023  2:02 AM Kelvin Mendez Add [R10 13] Epigastric abdominal pain       ED Disposition     ED Disposition   Admit    Condition   Stable    Date/Time   Wed Mar 15, 2023  1:25 AM    Comment   Case was discussed with NP for hospitalist and the patient's admission status was agreed to be Admission Status: observation status to the service of   Alexandra  Follow-up Information    None         Current Discharge Medication List      CONTINUE these medications which have NOT CHANGED    Details   DULoxetine (CYMBALTA) 60 mg delayed release capsule Take 1 capsule (60 mg total) by mouth daily  Qty: 180 capsule, Refills: 1    Associated Diagnoses: CAR (generalized anxiety disorder)      acetaminophen (TYLENOL) 325 mg tablet Take 2 tablets (650 mg total) by mouth every 6 (six) hours as needed for mild pain, headaches or fever  Refills: 0    Associated Diagnoses: Epigastric abdominal pain      ALPRAZolam (XANAX) 0 5 mg tablet 1-2 po q8hrs prn anxiety  Qty: 90 tablet, Refills: 0    Associated Diagnoses: Situational anxiety      ondansetron (Zofran ODT) 4 mg disintegrating tablet Take 1 tablet (4 mg total) by mouth every 6 (six) hours as needed for nausea or vomiting  Qty: 20 tablet, Refills: 0    Associated Diagnoses: Nausea and vomiting             No discharge procedures on file      PDMP Review       Value Time User    PDMP Reviewed  Yes 3/6/2023 10:44 AM Delaney Merida MD          ED Provider  Electronically Signed by           Michele Perry DO  03/15/23 0257       Michele Perry DO  03/15/23 6339

## 2023-03-15 NOTE — CONSULTS
Consultation - [facility]   Randall Mahajan 32 y o  male MRN: [id number]  Unit/Bed#: 3 [place] 314-01 Encounter: [id number]        Inpatient consult to gastroenterology  Consult performed by: KY Berrios  Consult ordered by: Nisha Persaud          Reason for Consult / Principal Problem:     Epigastric abdominal pain      ASSESSMENT AND PLAN:     This is a 33 y/o M with complex medical history including ankylosing spondylitis and IBD status post colectomy with J-pouch formation, proctitis, surgery 10/18 for diverting ileostomy complicated by prolapse and localized intra-abdominal infection with plan for reconstruction of pouch in April at [facility] scheduled for April 20 who presents due to abdominal pain  * Epigastric abdominal pain  Assessment & Plan  Patient recently started taking 6 Imodium daily last week for high output ileostomy which he reports significantly helped  Monday he reports he ate dinner and then did not have any ileostomy output until 4 AM which was abnormal for him and then he developed abdominal pain, N/V  He presented to the ED and XR showed possible ileus vs developing SBO but CT scan performed was negative for acute abnormalities and he was discharged  He reports he then developed severe abdominal pain with thicker than normal ileostomy output and his stoma appeared twice it's normal size so he presented back to the ED  N/V now resolved and ileostomy output, stoma has returned to normal   He reports several members of his household having GI symptoms as well  Symptoms may be secondary to ileus, gastroenteritis, now improved  • History of UC, status post total 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 stricture with ileostomy creation in October 2022  Patient follows colorectal surgery in [facility]  Reports his surgeon is going to change his pouch in April 20     • Lactic acid normal, WBC 13 on admission now resolved  Afebrile  • Check enteric panel, O+P, C diff PCR  • Ok to advance to low fiber diet  Start Imodium 2 mg TID prior to meals  • Monitor strict I+O  • Pt reports Questran/Fiber didn't work for him in the past for his high output ileostomy due to worsening abdominal pain  Trial Imodium 1 capsule TID prior to meals  • Monitor strict I+O  • GI prophylaxis Pepcid BID  • Pain control  Minimize narcotic use  • Supportive care  • Continue follow up with [facility] colorectal      ______________________________________________________________________    HPI:  Caleb Jewell is a 32 y o  male  with complex medical history including ankylosing spondylitis and IBD status post colectomy with J-pouch formation, proctitis, surgery 10/18 for diverting ileostomy complicated by prolapse and localized intra-abdominal infection with plan for reconstruction of pouch in April at [facility] who presented due to abdominal pain  He reports he was recommended by his colorectal team at [facility] to start taking Imodium prior to each meal up to 7 Imodium daily and he has been taking 2 prior to each meal with improvement in ostomy output  On Monday, he ate dinner and then did not have any ileostomy output until 4 AM associated with bloating which was abnormal for him and then he started having nausea/vomiting and abdominal pain with return of ileostomy output around 10 AM   He presented to the ER and x-ray showed multiple prominent to mildly dilated loops of small bowel which may represent ileus versus developing small bowel obstruction  He had follow-up CT abdomen pelvis with IV contrast which showed no new findings except for chronic J-pouch thickening similar to appearance to comparison study from 2/26/2023  Patient reports he did drink contrast for the study as well  He was discharged home and then woke up this morning with severe abdominal pain and reports stoma was 2 times its normal size with thick ileostomy output    Denies any black/bloody stool  He states that now nausea/vomiting has resolved and abdominal pain has improved and his abdomen is now just sore  Ileostomy output has returned back to its normal consistency and stoma now normal appearing  He reports several sick contacts in his house with vomiting and diarrhea as well  REVIEW OF SYSTEMS:    CONSTITUTIONAL: Denies any fever, chills, rigors, and weight loss  HEENT: No earache or tinnitus  Denies hearing loss or visual disturbances  CARDIOVASCULAR: No chest pain or palpitations  RESPIRATORY: Denies any cough, hemoptysis, shortness of breath or dyspnea on exertion  GASTROINTESTINAL: As noted in the History of Present Illness  GENITOURINARY: No problems with urination  Denies any hematuria or dysuria  NEUROLOGIC: No dizziness or vertigo, denies headaches  MUSCULOSKELETAL: Denies any muscle or joint pain  SKIN: Denies skin rashes or itching  ENDOCRINE: Denies excessive thirst  Denies intolerance to heat or cold  PSYCHOSOCIAL: Denies depression or anxiety  Denies any recent memory loss         Historical Information   Past Medical History:   Diagnosis Date   • Ankylosing spondylitis ([place] [address] )    • Anxiety    • Bowel obstruction ([place] [address] )    • Clostridium difficile colitis 9/13/2018   • Colitis    • 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[facility] ) 9/13/2018   • Pancreatitis    • Ulcerative colitis ([facility] )      Past Surgical History:   Procedure Laterality Date   • COLECTOMY TOTAL      with ileal pouch and anastemosis   • IR PICC PLACEMENT SINGLE LUMEN  3/1/2022   • TOTAL COLECTOMY       Social History   Social History     Substance and Sexual Activity   Alcohol Use Not Currently    Comment: pt states 5 a month/socially     Social History     Substance and Sexual Activity   Drug Use No     Social History     Tobacco Use   Smoking Status Never   Smokeless Tobacco Never     Family History   Problem Relation Age of Onset   • Lung disease Neg Hx        Meds/Allergies     Medications Prior to Admission   Medication   • DULoxetine (CYMBALTA) 60 mg delayed release capsule   • acetaminophen (TYLENOL) 325 mg tablet   • ALPRAZolam (XANAX) 0 5 mg tablet   • ondansetron (Zofran ODT) 4 mg disintegrating tablet   • ondansetron (ZOFRAN-ODT) 4 mg disintegrating tablet     Current Facility-Administered Medications   Medication Dose Route Frequency   • acetaminophen (TYLENOL) tablet 650 mg  650 mg Oral Q6H PRN   • ALPRAZolam (XANAX) tablet 0 5 mg  0 5 mg Oral BID PRN   • DULoxetine (CYMBALTA) delayed release capsule 60 mg  60 mg Oral Daily   • enoxaparin (LOVENOX) subcutaneous injection 40 mg  40 mg Subcutaneous Q24H KAITLIN   • Famotidine (PF) (PEPCID) injection 20 mg  20 mg Intravenous Q12H [facility]   • HYDROmorphone HCl (DILAUDID) injection 0 2 mg  0 2 mg Intravenous Q3H PRN    Or   • HYDROmorphone (DILAUDID) injection 0 5 mg  0 5 mg Intravenous Q3H PRN   • lactated ringers infusion  125 mL/hr Intravenous Continuous   • ondansetron (ZOFRAN) injection 4 mg  4 mg Intravenous Q6H PRN       Allergies   Allergen Reactions   • Cefazolin Hives     Other reaction(s): Arrythmia (Abnormal Heartbeat), Rash Maculopapular   • Mesalamine GI Intolerance     Other reaction(s): Pancreatitis  [facility] [id number]: pancreatitis   • Wound Dressings Rash     Coloplast ostomy Products            Objective     Blood pressure 111/70, pulse 59, temperature 97 7 °F (36 5 °C), temperature source Oral, resp  rate 16, SpO2 100 %  There is no height or weight on file to calculate BMI  No intake or output data in the 24 hours ending 03/15/23 1329      PHYSICAL EXAM:      General Appearance:   Alert, cooperative, no distress   HEENT:   Normocephalic, atraumatic, anicteric  Neck:  Supple, symmetrical, trachea midline   Lungs:   Clear to auscultation bilaterally; no rales, rhonchi or wheezing; respirations unlabored    Heart[de-identified]   Regular rate and rhythm; no murmur, rub, or gallop     Abdomen:   Soft, non-tender, non-distended; normal bowel sounds; no masses, no organomegaly Genitalia:   Deferred    Rectal:   Deferred    Extremities:  No cyanosis, clubbing or edema    Pulses:  2+ and symmetric all extremities    Skin:  No jaundice, rashes, or lesions    Lymph nodes:  No palpable cervical lymphadenopathy        Lab Results:   Admission on 03/15/2023   Component Date Value   • WBC 03/15/2023 7 76    • RBC 03/15/2023 5 85 (H)    • Hemoglobin 03/15/2023 11 1 (L)    • Hematocrit 03/15/2023 36 8    • MCV 03/15/2023 63 (L)    • MCH 03/15/2023 19 0 (L)    • MCHC 03/15/2023 30 2 (L)    • RDW 03/15/2023 16 3 (H)    • MPV 03/15/2023 8 9    • Platelets 95/36/1535 396 (H)    • nRBC 03/15/2023 0    • Neutrophils Relative 03/15/2023 54    • Immat GRANS % 03/15/2023 0    • Lymphocytes Relative 03/15/2023 27    • Monocytes Relative 03/15/2023 11    • Eosinophils Relative 03/15/2023 6    • Basophils Relative 03/15/2023 2 (H)    • Neutrophils Absolute 03/15/2023 4 14    • Immature Grans Absolute 03/15/2023 0 02    • Lymphocytes Absolute 03/15/2023 2 08    • Monocytes Absolute 03/15/2023 0 88    • Eosinophils Absolute 03/15/2023 0 49    • Basophils Absolute 03/15/2023 0 15 (H)    • Sodium 03/15/2023 136    • Potassium 03/15/2023 3 8    • Chloride 03/15/2023 102    • CO2 03/15/2023 24    • ANION GAP 03/15/2023 10    • BUN 03/15/2023 10    • Creatinine 03/15/2023 0 95    • Glucose 03/15/2023 115    • Calcium 03/15/2023 9 0    • AST 03/15/2023 24    • ALT 03/15/2023 39    • Alkaline Phosphatase 03/15/2023 80    • Total Protein 03/15/2023 7 2    • Albumin 03/15/2023 4 7    • Total Bilirubin 03/15/2023 0 59    • eGFR 03/15/2023 107    • Magnesium 03/15/2023 1 8 (L)    • Lipase 03/15/2023 23    • SARS-CoV-2 03/15/2023 Negative    • INFLUENZA A PCR 03/15/2023 Negative    • INFLUENZA B PCR 03/15/2023 Negative    • RSV PCR 03/15/2023 Negative    • Color, UA 03/15/2023 Light Yellow    • Clarity, UA 03/15/2023 Clear    • Specific Gravity, UA 03/15/2023 1 010    • pH, UA 03/15/2023 6 0    • Leukocytes, UA 03/15/2023 Negative    • Nitrite, UA 03/15/2023 Negative    • Protein, UA 03/15/2023 Negative    • Glucose, UA 03/15/2023 Negative    • Ketones, UA 03/15/2023 Negative    • Urobilinogen, UA 03/15/2023 0 2    • Bilirubin, UA 03/15/2023 Negative    • Occult Blood, UA 03/15/2023 Negative    • Procalcitonin 03/15/2023 <0 05        Imaging Studies: I have personally reviewed pertinent imaging studies

## 2023-03-15 NOTE — ASSESSMENT & PLAN NOTE
Baseline hemoglobin appears to be 10-11s  · Hemoglobin stable  · Recommend MVI on discharge  · Monitor

## 2023-03-15 NOTE — ASSESSMENT & PLAN NOTE
Patient presents with epigastric pain and pain/swelling around ileostomy stoma started around 830 AM yesterday morning,with associated nausea vomiting about 6 times, did not eat or drink much at all  Reports started taking Imodium for chronic liquidy diarrhea about 7 times a day for past week as instructed by his surgeon, yesterday morning stool was thick and he noticed pain and swelling around ileostomy stoma  · Of note, patient was seen in ED earlier during the day for above complaints, underwent CT abdomen pelvis with IV contrast which showed no acute findings  Patient was discharged but returned in about 2 hours due to severe abdominal pain  · Patient has frequent hospitalizations due to abdominal pain nausea vomiting, patient was just discharged about a week ago  · History of UC, status post total 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

## 2023-03-15 NOTE — ASSESSMENT & PLAN NOTE
· Patient meets SIRS criteria from earlier blood work  Repeat blood work showed a normal white count  · Patient afebrile  CT no acute findings

## 2023-03-15 NOTE — ASSESSMENT & PLAN NOTE
· Baseline hemoglobin appears to be 10-11s  · Hemoglobin stable  · Recommend MVI on discharge  · Monitor

## 2023-03-16 ENCOUNTER — HOSPITAL ENCOUNTER (EMERGENCY)
Facility: HOSPITAL | Age: 28
Discharge: HOME | End: 2023-03-17
Attending: EMERGENCY MEDICINE

## 2023-03-16 VITALS
WEIGHT: 181.6 LBS | BODY MASS INDEX: 26.82 KG/M2 | SYSTOLIC BLOOD PRESSURE: 142 MMHG | TEMPERATURE: 98.4 F | HEART RATE: 89 BPM | OXYGEN SATURATION: 100 % | DIASTOLIC BLOOD PRESSURE: 103 MMHG | RESPIRATION RATE: 18 BRPM

## 2023-03-16 DIAGNOSIS — K52.9 COLITIS: Primary | ICD-10-CM

## 2023-03-16 LAB
ALBUMIN SERPL BCP-MCNC: 4.9 G/DL (ref 3.5–5)
ALP SERPL-CCNC: 87 U/L (ref 34–104)
ALT SERPL W P-5'-P-CCNC: 37 U/L (ref 7–52)
ANION GAP SERPL CALCULATED.3IONS-SCNC: 9 MMOL/L (ref 4–13)
AST SERPL W P-5'-P-CCNC: 21 U/L (ref 13–39)
BASOPHILS # BLD AUTO: 0.09 THOUSANDS/ÂΜL (ref 0–0.1)
BASOPHILS NFR BLD AUTO: 1 % (ref 0–1)
BILIRUB SERPL-MCNC: 0.74 MG/DL (ref 0.2–1)
BUN SERPL-MCNC: 7 MG/DL (ref 5–25)
CALCIUM SERPL-MCNC: 9.6 MG/DL (ref 8.4–10.2)
CAMPYLOBACTER DNA SPEC NAA+PROBE: NORMAL
CHLORIDE SERPL-SCNC: 104 MMOL/L (ref 96–108)
CO2 SERPL-SCNC: 26 MMOL/L (ref 21–32)
CREAT SERPL-MCNC: 0.93 MG/DL (ref 0.6–1.3)
EOSINOPHIL # BLD AUTO: 0.26 THOUSAND/ÂΜL (ref 0–0.61)
EOSINOPHIL NFR BLD AUTO: 3 % (ref 0–6)
ERYTHROCYTE [DISTWIDTH] IN BLOOD BY AUTOMATED COUNT: 17.6 % (ref 11.6–15.1)
GFR SERPL CREATININE-BSD FRML MDRD: 110 ML/MIN/1.73SQ M
GLUCOSE SERPL-MCNC: 85 MG/DL (ref 65–140)
HCT VFR BLD AUTO: 37.5 % (ref 36.5–49.3)
HGB BLD-MCNC: 11.1 G/DL (ref 12–17)
IMM GRANULOCYTES # BLD AUTO: 0.02 THOUSAND/UL (ref 0–0.2)
IMM GRANULOCYTES NFR BLD AUTO: 0 % (ref 0–2)
LACTATE SERPL-SCNC: 1.4 MMOL/L (ref 0.5–2)
LIPASE SERPL-CCNC: 19 U/L (ref 11–82)
LYMPHOCYTES # BLD AUTO: 1.69 THOUSANDS/ÂΜL (ref 0.6–4.47)
LYMPHOCYTES NFR BLD AUTO: 16 % (ref 14–44)
MCH RBC QN AUTO: 18.8 PG (ref 26.8–34.3)
MCHC RBC AUTO-ENTMCNC: 29.6 G/DL (ref 31.4–37.4)
MCV RBC AUTO: 63 FL (ref 82–98)
MONOCYTES # BLD AUTO: 0.74 THOUSAND/ÂΜL (ref 0.17–1.22)
MONOCYTES NFR BLD AUTO: 7 % (ref 4–12)
NEUTROPHILS # BLD AUTO: 7.69 THOUSANDS/ÂΜL (ref 1.85–7.62)
NEUTS SEG NFR BLD AUTO: 73 % (ref 43–75)
NRBC BLD AUTO-RTO: 0 /100 WBCS
PLATELET # BLD AUTO: 399 THOUSANDS/UL (ref 149–390)
PMV BLD AUTO: 8.7 FL (ref 8.9–12.7)
POTASSIUM SERPL-SCNC: 3.6 MMOL/L (ref 3.5–5.3)
PROT SERPL-MCNC: 7.6 G/DL (ref 6.4–8.4)
RBC # BLD AUTO: 5.92 MILLION/UL (ref 3.88–5.62)
SALMONELLA DNA SPEC QL NAA+PROBE: NORMAL
SHIGA TOXIN STX GENE SPEC NAA+PROBE: NORMAL
SHIGELLA DNA SPEC QL NAA+PROBE: NORMAL
SODIUM SERPL-SCNC: 139 MMOL/L (ref 135–147)
WBC # BLD AUTO: 10.49 THOUSAND/UL (ref 4.31–10.16)

## 2023-03-16 RX ORDER — HYDROMORPHONE HCL 110MG/55ML
2 PATIENT CONTROLLED ANALGESIA SYRINGE INTRAVENOUS ONCE
Status: COMPLETED | OUTPATIENT
Start: 2023-03-16 | End: 2023-03-16

## 2023-03-16 RX ORDER — ONDANSETRON 2 MG/ML
4 INJECTION INTRAMUSCULAR; INTRAVENOUS ONCE
Status: COMPLETED | OUTPATIENT
Start: 2023-03-16 | End: 2023-03-16

## 2023-03-16 RX ADMIN — ONDANSETRON 4 MG: 2 INJECTION INTRAMUSCULAR; INTRAVENOUS at 22:11

## 2023-03-16 RX ADMIN — IOHEXOL 50 ML: 240 INJECTION, SOLUTION INTRATHECAL; INTRAVASCULAR; INTRAVENOUS; ORAL at 22:26

## 2023-03-16 RX ADMIN — SODIUM CHLORIDE 1000 ML: 0.9 INJECTION, SOLUTION INTRAVENOUS at 22:01

## 2023-03-16 RX ADMIN — HYDROMORPHONE HYDROCHLORIDE 2 MG: 2 INJECTION, SOLUTION INTRAMUSCULAR; INTRAVENOUS; SUBCUTANEOUS at 22:09

## 2023-03-16 NOTE — PLAN OF CARE
Problem: PAIN - ADULT  Goal: Verbalizes/displays adequate comfort level or baseline comfort level  Description: Interventions:  - Encourage patient to monitor pain and request assistance  - Assess pain using appropriate pain scale  - Administer analgesics based on type and severity of pain and evaluate response  - Implement non-pharmacological measures as appropriate and evaluate response  - Consider cultural and social influences on pain and pain management  - Notify physician/advanced practitioner if interventions unsuccessful or patient reports new pain  Outcome: Progressing     Problem: GASTROINTESTINAL - ADULT  Goal: Minimal or absence of nausea and/or vomiting  Description: INTERVENTIONS:  - Administer IV fluids if ordered to ensure adequate hydration  - Maintain NPO status until nausea and vomiting are resolved  - Nasogastric tube if ordered  - Administer ordered antiemetic medications as needed  - Provide nonpharmacologic comfort measures as appropriate  - Advance diet as tolerated, if ordered  - Consider nutrition services referral to assist patient with adequate nutrition and appropriate food choices  Outcome: Progressing     Problem: GASTROINTESTINAL - ADULT  Goal: Establish and maintain optimal ostomy function  Description: INTERVENTIONS:  - Assess bowel function  - Encourage oral fluids to ensure adequate hydration  - Administer IV fluids if ordered to ensure adequate hydration   - Administer ordered medications as needed  - Encourage mobilization and activity  - Nutrition services referral to assist patient with appropriate food choices  - Assess stoma site  - Consider wound care consult   Outcome: Progressing

## 2023-03-16 NOTE — NURSING NOTE
Patient's IV removed  Patient left with all belongings  AVS reviewed with patient  Patient verbalized understanding of AVS  Patient left [address]  in stable condition

## 2023-03-17 ENCOUNTER — APPOINTMENT (EMERGENCY)
Dept: RADIOLOGY | Facility: HOSPITAL | Age: 28
End: 2023-03-17

## 2023-03-17 RX ORDER — MORPHINE SULFATE 4 MG/ML
4 INJECTION, SOLUTION INTRAMUSCULAR; INTRAVENOUS ONCE
Status: COMPLETED | OUTPATIENT
Start: 2023-03-17 | End: 2023-03-17

## 2023-03-17 RX ORDER — ONDANSETRON 2 MG/ML
4 INJECTION INTRAMUSCULAR; INTRAVENOUS ONCE
Status: COMPLETED | OUTPATIENT
Start: 2023-03-17 | End: 2023-03-17

## 2023-03-17 RX ADMIN — MORPHINE SULFATE 4 MG: 4 INJECTION INTRAVENOUS at 01:14

## 2023-03-17 RX ADMIN — ONDANSETRON 4 MG: 2 INJECTION INTRAMUSCULAR; INTRAVENOUS at 01:14

## 2023-03-17 RX ADMIN — IOHEXOL 100 ML: 350 INJECTION, SOLUTION INTRAVENOUS at 01:11

## 2023-03-17 NOTE — ED PROVIDER NOTES
History  Chief Complaint   Patient presents with   • Abdominal Pain     States he definitely has a obstruction, no output in ostomy for 7-8 hours  Patient here for abdominal pain 2 days ago      Patient presents for abdominal pain and concerns for small bowel obstruction  Patient has a history of Crohn's disease with multiple surgeries and obstructions  He currently has an ostomy has no output for the last 8 hours  He normally has frequent output  No vomiting at this time  Patient states he did eat some rice and chicken earlier today  He even recently decreased his Imodium dose  History provided by:  Patient   used: No    Abdominal Pain  Associated symptoms: no chest pain, no chills, no cough, no dysuria, no fever, no hematuria, no shortness of breath, no sore throat and no vomiting        Prior to Admission Medications   Prescriptions Last Dose Informant Patient Reported? Taking? ALPRAZolam (XANAX) 0 5 mg tablet   No No   Si-2 po q8hrs prn anxiety   DULoxetine (CYMBALTA) 60 mg delayed release capsule   No No   Sig: Take 1 capsule (60 mg total) by mouth daily   acetaminophen (TYLENOL) 325 mg tablet   No No   Sig: Take 2 tablets (650 mg total) by mouth every 6 (six) hours as needed for mild pain, headaches or fever   loperamide (IMODIUM) 2 mg capsule   No No   Sig: Take 1 capsule (2 mg total) by mouth 3 (three) times a day before meals Do not start before 2023     ondansetron (ZOFRAN-ODT) 4 mg disintegrating tablet   No No   Sig: Take 1 tablet (4 mg total) by mouth every 6 (six) hours as needed for nausea for up to 3 days      Facility-Administered Medications: None       Past Medical History:   Diagnosis Date   • Ankylosing spondylitis ([facility] )    • Anxiety    • Bowel obstruction ([facility])    • Clostridium difficile colitis 2018   • Colitis    • 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[facility] ) 2018   • Pancreatitis    • Ulcerative colitis [facility])        Past Surgical History:   Procedure Laterality Date   • COLECTOMY TOTAL      with ileal pouch and anastemosis   • IR PICC PLACEMENT SINGLE LUMEN  3/1/2022   • TOTAL COLECTOMY         Family History   Problem Relation Age of Onset   • Lung disease Neg Hx      I have reviewed and agree with the history as documented  E-Cigarette/Vaping   • E-Cigarette Use Never User      E-Cigarette/Vaping Substances   • Nicotine No    • THC No    • CBD No    • Flavoring No    • Other No    • Unknown No      Social History     Tobacco Use   • Smoking status: Never   • Smokeless tobacco: Never   Vaping Use   • Vaping Use: Never used   Substance Use Topics   • Alcohol use: Not Currently     Comment: pt states 5 a month/socially   • Drug use: No       Review of Systems   Constitutional: Negative for chills and fever  HENT: Negative for ear pain and sore throat  Eyes: Negative for pain and visual disturbance  Respiratory: Negative for cough and shortness of breath  Cardiovascular: Negative for chest pain and palpitations  Gastrointestinal: Positive for abdominal pain  Negative for vomiting  Genitourinary: Negative for dysuria and hematuria  Musculoskeletal: Negative for arthralgias and back pain  Skin: Negative for color change and rash  Neurological: Negative for seizures and syncope  All other systems reviewed and are negative  Physical Exam  Physical Exam  Vitals and nursing note reviewed  Constitutional:       General: He is not in acute distress  Cardiovascular:      Rate and Rhythm: Normal rate and regular rhythm  Pulmonary:      Effort: Pulmonary effort is normal  No respiratory distress  Breath sounds: Normal breath sounds  Abdominal:      General: Abdomen is flat  Bowel sounds are normal  There is no distension  Palpations: Abdomen is soft  Tenderness: There is abdominal tenderness  There is no guarding or rebound        Comments: Diffuse abdominal tenderness ostomy bag with no output   Musculoskeletal: General: No deformity  Normal range of motion  Skin:     Capillary Refill: Capillary refill takes less than 2 seconds  Findings: No rash  Neurological:      General: No focal deficit present  Mental Status: He is alert and oriented to person, place, and time           Vital Signs  ED Triage Vitals [03/16/23 1955]   Temperature Pulse Respirations Blood Pressure SpO2   98 4 °F (36 9 °C) 89 18 (!) 142/103 100 %      Temp Source Heart Rate Source Patient Position - Orthostatic VS BP Location FiO2 (%)   Tympanic Monitor Sitting Left arm --      Pain Score       9           Vitals:    03/16/23 1955   BP: (!) 142/103   Pulse: 89   Patient Position - Orthostatic VS: Sitting         Visual Acuity      ED Medications  Medications   sodium chloride 0 9 % bolus 1,000 mL (1,000 mL Intravenous New Bag 3/16/23 2201)   iohexol (OMNIPAQUE) 240 MG/ML solution 50 mL (has no administration in time range)   ondansetron (ZOFRAN) injection 4 mg (4 mg Intravenous Given 3/16/23 2211)   HYDROmorphone (DILAUDID) injection 2 mg (2 mg Intravenous Given 3/16/23 2209)       Diagnostic Studies  Results Reviewed     Procedure Component Value Units Date/Time    CBC and differential [[id number]]  (Abnormal) Collected: 03/16/23 2153    Lab Status: Final result Specimen: Blood from Arm, Right Updated: 03/16/23 2159     WBC 10 49 Thousand/uL      RBC 5 92 Million/uL      Hemoglobin 11 1 g/dL      Hematocrit 37 5 %      MCV 63 fL      MCH 18 8 pg      MCHC 29 6 g/dL      RDW 17 6 %      MPV 8 7 fL      Platelets 781 Thousands/uL      nRBC 0 /100 WBCs      Neutrophils Relative 73 %      Immat GRANS % 0 %      Lymphocytes Relative 16 %      Monocytes Relative 7 %      Eosinophils Relative 3 %      Basophils Relative 1 %      Neutrophils Absolute 7 69 Thousands/µL      Immature Grans Absolute 0 02 Thousand/uL      Lymphocytes Absolute 1 69 Thousands/µL      Monocytes Absolute 0 74 Thousand/µL      Eosinophils Absolute 0 26 Thousand/µL Basophils Absolute 0 09 Thousands/µL     Comprehensive metabolic panel [[id number]] Collected: 03/16/23 2153    Lab Status: In process Specimen: Blood from Arm, Right Updated: 03/16/23 2157    Lipase [[id number]] Collected: 03/16/23 2153    Lab Status: In process Specimen: Blood from Arm, Right Updated: 03/16/23 2157    Lactic acid [[id number]] Collected: 03/16/23 2153    Lab Status: In process Specimen: Blood from Arm, Right Updated: 03/16/23 2156                 CT abdomen pelvis with contrast    (Results Pending)              Procedures  Procedures         ED Course                                             Medical Decision Making  Pulse ox 100% on room air indicating adequate oxygenation  Differential diagnosis include but not limited to Crohn's disease, small bowel obstruction      Signed out to next provider Dr Arin Weaver pending CT results  Amount and/or Complexity of Data Reviewed  Labs: ordered  Radiology: ordered  Risk  Prescription drug management  Disposition  Final diagnoses:   None     ED Disposition     None      Follow-up Information    None         Patient's Medications   Discharge Prescriptions    No medications on file       No discharge procedures on file      PDMP Review       Value Time User    PDMP Reviewed  Yes 3/6/2023 10:44 AM Rosie Man MD          ED Provider  Electronically Signed by           Kateryna Armenta DO  03/18/23 2476

## 2023-03-19 LAB — WBC SPEC QL GRAM STN: NORMAL

## 2023-03-29 ENCOUNTER — TELEPHONE (OUTPATIENT)
Dept: FAMILY MEDICINE CLINIC | Facility: CLINIC | Age: 28
End: 2023-03-29

## 2023-03-29 DIAGNOSIS — G47.09 OTHER INSOMNIA: Primary | ICD-10-CM

## 2023-03-29 RX ORDER — ZOLPIDEM TARTRATE 12.5 MG/1
12.5 TABLET, FILM COATED, EXTENDED RELEASE ORAL
Qty: 30 TABLET | Refills: 0 | Status: SHIPPED | OUTPATIENT
Start: 2023-03-29

## 2023-03-29 NOTE — TELEPHONE ENCOUNTER
A few weeks ago you had discussed sleeping medication to help him fall asleep at night   Please elio

## 2023-03-31 ENCOUNTER — APPOINTMENT (EMERGENCY)
Dept: RADIOLOGY | Facility: HOSPITAL | Age: 28
End: 2023-03-31

## 2023-03-31 ENCOUNTER — HOSPITAL ENCOUNTER (EMERGENCY)
Facility: HOSPITAL | Age: 28
Discharge: HOME | End: 2023-03-31
Attending: EMERGENCY MEDICINE

## 2023-03-31 VITALS
WEIGHT: 181.44 LBS | BODY MASS INDEX: 26.87 KG/M2 | TEMPERATURE: 99.2 F | RESPIRATION RATE: 16 BRPM | SYSTOLIC BLOOD PRESSURE: 129 MMHG | HEART RATE: 105 BPM | OXYGEN SATURATION: 95 % | DIASTOLIC BLOOD PRESSURE: 77 MMHG | HEIGHT: 69 IN

## 2023-03-31 DIAGNOSIS — J18.9 COMMUNITY ACQUIRED PNEUMONIA: ICD-10-CM

## 2023-03-31 DIAGNOSIS — R10.9 ABDOMINAL PAIN: Primary | ICD-10-CM

## 2023-03-31 LAB
ALBUMIN SERPL BCP-MCNC: 4.7 G/DL (ref 3.5–5)
ALP SERPL-CCNC: 101 U/L (ref 34–104)
ALT SERPL W P-5'-P-CCNC: 68 U/L (ref 7–52)
ANION GAP SERPL CALCULATED.3IONS-SCNC: 10 MMOL/L (ref 4–13)
APTT PPP: 28 SECONDS (ref 23–37)
AST SERPL W P-5'-P-CCNC: 36 U/L (ref 13–39)
ATRIAL RATE: 111 BPM
BACTERIA UR QL AUTO: ABNORMAL /HPF
BASOPHILS # BLD AUTO: 0.09 THOUSANDS/ÂΜL (ref 0–0.1)
BASOPHILS NFR BLD AUTO: 1 % (ref 0–1)
BILIRUB SERPL-MCNC: 0.79 MG/DL (ref 0.2–1)
BILIRUB UR QL STRIP: NEGATIVE
BUN SERPL-MCNC: 10 MG/DL (ref 5–25)
CALCIUM SERPL-MCNC: 9.5 MG/DL (ref 8.4–10.2)
CHLORIDE SERPL-SCNC: 102 MMOL/L (ref 96–108)
CLARITY UR: CLEAR
CO2 SERPL-SCNC: 24 MMOL/L (ref 21–32)
COLOR UR: COLORLESS
CREAT SERPL-MCNC: 0.91 MG/DL (ref 0.6–1.3)
EOSINOPHIL # BLD AUTO: 0.13 THOUSAND/ÂΜL (ref 0–0.61)
EOSINOPHIL NFR BLD AUTO: 2 % (ref 0–6)
ERYTHROCYTE [DISTWIDTH] IN BLOOD BY AUTOMATED COUNT: 18.1 % (ref 11.6–15.1)
FLUAV RNA RESP QL NAA+PROBE: NEGATIVE
FLUBV RNA RESP QL NAA+PROBE: NEGATIVE
GFR SERPL CREATININE-BSD FRML MDRD: 113 ML/MIN/1.73SQ M
GLUCOSE SERPL-MCNC: 82 MG/DL (ref 65–140)
GLUCOSE UR STRIP-MCNC: NEGATIVE MG/DL
HCT VFR BLD AUTO: 37.3 % (ref 36.5–49.3)
HGB BLD-MCNC: 11.3 G/DL (ref 12–17)
HGB UR QL STRIP.AUTO: ABNORMAL
IMM GRANULOCYTES # BLD AUTO: 0.03 THOUSAND/UL (ref 0–0.2)
IMM GRANULOCYTES NFR BLD AUTO: 0 % (ref 0–2)
INR PPP: 0.95 (ref 0.84–1.19)
KETONES UR STRIP-MCNC: NEGATIVE MG/DL
LACTATE SERPL-SCNC: 1.3 MMOL/L (ref 0.5–2)
LEUKOCYTE ESTERASE UR QL STRIP: NEGATIVE
LYMPHOCYTES # BLD AUTO: 0.84 THOUSANDS/ÂΜL (ref 0.6–4.47)
LYMPHOCYTES NFR BLD AUTO: 10 % (ref 14–44)
MCH RBC QN AUTO: 18.7 PG (ref 26.8–34.3)
MCHC RBC AUTO-ENTMCNC: 30.3 G/DL (ref 31.4–37.4)
MCV RBC AUTO: 62 FL (ref 82–98)
MONOCYTES # BLD AUTO: 1.12 THOUSAND/ÂΜL (ref 0.17–1.22)
MONOCYTES NFR BLD AUTO: 13 % (ref 4–12)
MUCOUS THREADS UR QL AUTO: ABNORMAL
NEUTROPHILS # BLD AUTO: 6.48 THOUSANDS/ÂΜL (ref 1.85–7.62)
NEUTS SEG NFR BLD AUTO: 74 % (ref 43–75)
NITRITE UR QL STRIP: NEGATIVE
NON-SQ EPI CELLS URNS QL MICRO: ABNORMAL /HPF
NRBC BLD AUTO-RTO: 0 /100 WBCS
P AXIS: 41 DEGREES
PH UR STRIP.AUTO: 6 [PH]
PLATELET # BLD AUTO: 329 THOUSANDS/UL (ref 149–390)
PMV BLD AUTO: 8.6 FL (ref 8.9–12.7)
POTASSIUM SERPL-SCNC: 3.6 MMOL/L (ref 3.5–5.3)
PR INTERVAL: 160 MS
PROCALCITONIN SERPL-MCNC: 0.14 NG/ML
PROT SERPL-MCNC: 7.7 G/DL (ref 6.4–8.4)
PROT UR STRIP-MCNC: NEGATIVE MG/DL
PROTHROMBIN TIME: 12.8 SECONDS (ref 11.6–14.5)
QRS AXIS: -10 DEGREES
QRSD INTERVAL: 86 MS
QT INTERVAL: 326 MS
QTC INTERVAL: 443 MS
RBC # BLD AUTO: 6.03 MILLION/UL (ref 3.88–5.62)
RBC #/AREA URNS AUTO: ABNORMAL /HPF
RSV RNA RESP QL NAA+PROBE: NEGATIVE
SARS-COV-2 RNA RESP QL NAA+PROBE: NEGATIVE
SODIUM SERPL-SCNC: 136 MMOL/L (ref 135–147)
SP GR UR STRIP.AUTO: <=1.005 (ref 1–1.03)
T WAVE AXIS: 27 DEGREES
UROBILINOGEN UR QL STRIP.AUTO: 0.2 E.U./DL
VENTRICULAR RATE: 111 BPM
WBC # BLD AUTO: 8.69 THOUSAND/UL (ref 4.31–10.16)
WBC #/AREA URNS AUTO: ABNORMAL /HPF

## 2023-03-31 RX ORDER — DOXYCYCLINE HYCLATE 100 MG/1
100 CAPSULE ORAL 2 TIMES DAILY
Qty: 10 CAPSULE | Refills: 0 | Status: SHIPPED | OUTPATIENT
Start: 2023-03-31 | End: 2023-04-05

## 2023-03-31 RX ORDER — METRONIDAZOLE 500 MG/100ML
500 INJECTION, SOLUTION INTRAVENOUS EVERY 8 HOURS
Status: DISCONTINUED | OUTPATIENT
Start: 2023-03-31 | End: 2023-03-31 | Stop reason: HOSPADM

## 2023-03-31 RX ORDER — HYDROMORPHONE HCL 110MG/55ML
2 PATIENT CONTROLLED ANALGESIA SYRINGE INTRAVENOUS ONCE
Status: COMPLETED | OUTPATIENT
Start: 2023-03-31 | End: 2023-03-31

## 2023-03-31 RX ORDER — CEFEPIME HYDROCHLORIDE 2 G/50ML
2000 INJECTION, SOLUTION INTRAVENOUS ONCE
Status: COMPLETED | OUTPATIENT
Start: 2023-03-31 | End: 2023-03-31

## 2023-03-31 RX ORDER — AMOXICILLIN 500 MG/1
1000 CAPSULE ORAL 2 TIMES DAILY
Qty: 20 CAPSULE | Refills: 0 | Status: SHIPPED | OUTPATIENT
Start: 2023-03-31 | End: 2023-04-05

## 2023-03-31 RX ORDER — ONDANSETRON 2 MG/ML
4 INJECTION INTRAMUSCULAR; INTRAVENOUS ONCE
Status: COMPLETED | OUTPATIENT
Start: 2023-03-31 | End: 2023-03-31

## 2023-03-31 RX ADMIN — METRONIDAZOLE 500 MG: 500 INJECTION, SOLUTION INTRAVENOUS at 15:35

## 2023-03-31 RX ADMIN — HYDROMORPHONE HYDROCHLORIDE 2 MG: 2 INJECTION, SOLUTION INTRAMUSCULAR; INTRAVENOUS; SUBCUTANEOUS at 14:55

## 2023-03-31 RX ADMIN — VANCOMYCIN HYDROCHLORIDE 2000 MG: 1 INJECTION, POWDER, LYOPHILIZED, FOR SOLUTION INTRAVENOUS at 15:37

## 2023-03-31 RX ADMIN — IOHEXOL 100 ML: 350 INJECTION, SOLUTION INTRAVENOUS at 17:12

## 2023-03-31 RX ADMIN — SODIUM CHLORIDE 1000 ML: 0.9 INJECTION, SOLUTION INTRAVENOUS at 15:10

## 2023-03-31 RX ADMIN — SODIUM CHLORIDE 1000 ML: 0.9 INJECTION, SOLUTION INTRAVENOUS at 19:00

## 2023-03-31 RX ADMIN — SODIUM CHLORIDE 1000 ML: 0.9 INJECTION, SOLUTION INTRAVENOUS at 14:56

## 2023-03-31 RX ADMIN — HYDROMORPHONE HYDROCHLORIDE 2 MG: 2 INJECTION, SOLUTION INTRAMUSCULAR; INTRAVENOUS; SUBCUTANEOUS at 17:42

## 2023-03-31 RX ADMIN — ONDANSETRON 4 MG: 2 INJECTION INTRAMUSCULAR; INTRAVENOUS at 14:56

## 2023-03-31 RX ADMIN — CEFEPIME HYDROCHLORIDE 2000 MG: 2 INJECTION, SOLUTION INTRAVENOUS at 15:10

## 2023-03-31 RX ADMIN — IOHEXOL 50 ML: 240 INJECTION, SOLUTION INTRATHECAL; INTRAVASCULAR; INTRAVENOUS; ORAL at 17:12

## 2023-03-31 NOTE — DISCHARGE INSTRUCTIONS
If you have any severe worsening of pain, significant chest pain, shortness of breath, if symptoms continue to worsen after 2 days of antibiotics, return to the emergency department  Your CAT scan showed findings suggestive of pneumonia

## 2023-03-31 NOTE — ED PROVIDER NOTES
History  Chief Complaint   Patient presents with   • Abdominal Pain     Pt  States abdominal pain started around 8:30 am and having nausea and light headiness  Pt  States did not take anything for the pain  Took tylenol around 12 noon      HPI  Patient is a 23-year-old male well-known to the emergency department frequent presentations for ulcerative colitis, ileal pouchitis, related complications including bowel obstruction, severe medication refractory abdominal pain, nausea, vomiting presenting for evaluation of severe epigastric and left lower quadrant abdominal pain starting this morning with multiple episodes of nonbloody nonbilious emesis  Patient continues to have severe pain, over the same interval states subjective fevers and chills as well as a measured Tmax of 103  Patient states arthralgias and myalgias, headache over the same interval, denies sore throat, rhinorrhea, cough, recent travel or sick contacts  Patient states that output out of ostomy has remained constant, nonbloody, nonmelanotic  Prior to Admission Medications   Prescriptions Last Dose Informant Patient Reported? Taking? ALPRAZolam (XANAX) 0 5 mg tablet Not Taking  No No   Si-2 po q8hrs prn anxiety   Patient not taking: Reported on 3/31/2023   DULoxetine (CYMBALTA) 60 mg delayed release capsule 3/30/2023  No Yes   Sig: Take 1 capsule (60 mg total) by mouth daily   acetaminophen (TYLENOL) 325 mg tablet 3/31/2023  No Yes   Sig: Take 2 tablets (650 mg total) by mouth every 6 (six) hours as needed for mild pain, headaches or fever   loperamide (IMODIUM) 2 mg capsule 3/30/2023  No Yes   Sig: Take 1 capsule (2 mg total) by mouth 3 (three) times a day before meals Do not start before 2023     ondansetron (ZOFRAN-ODT) 4 mg disintegrating tablet Not Taking  No No   Sig: Take 1 tablet (4 mg total) by mouth every 6 (six) hours as needed for nausea for up to 3 days   Patient not taking: Reported on 3/31/2023   zolpidem (AMBIEN CR) 12 5 MG CR tablet Past Week  No Yes   Sig: Take 1 tablet (12 5 mg total) by mouth daily at bedtime as needed for sleep Do not use with any opioids      Facility-Administered Medications: None       Past Medical History:   Diagnosis Date   • Ankylosing spondylitis (HCC)    • Anxiety    • Bowel obstruction (HCC)    • Clostridium difficile colitis 9/13/2018   • Colitis    • 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[facility] [address] ) 9/13/2018   • Pancreatitis    • [facility]        Past Surgical History:   Procedure Laterality Date   • COLECTOMY TOTAL      with ileal pouch and anastemosis   • IR PICC PLACEMENT SINGLE LUMEN  3/1/2022   • TOTAL COLECTOMY         Family History   Problem Relation Age of Onset   • Lung disease Neg Hx      I have reviewed and agree with the history as documented  E-Cigarette/Vaping   • E-Cigarette Use Never User      E-Cigarette/Vaping Substances   • Nicotine No    • THC No    • CBD No    • Flavoring No    • Other No    • Unknown No      Social History     Tobacco Use   • Smoking status: Never   • Smokeless tobacco: Never   Vaping Use   • Vaping Use: Never used   Substance Use Topics   • Alcohol use: Not Currently     Comment: pt states 5 a month/socially   • Drug use: No       Review of Systems   Constitutional: Positive for chills and fever  Negative for fatigue  HENT: Negative for sore throat  Respiratory: Negative for cough and shortness of breath  Gastrointestinal: Positive for abdominal pain, nausea and vomiting  Negative for abdominal distention and diarrhea  Genitourinary: Negative for flank pain  Musculoskeletal: Positive for arthralgias and myalgias  Skin: Negative for color change, pallor, rash and wound  Neurological: Positive for headaches  Psychiatric/Behavioral: Negative for confusion  Physical Exam  Physical Exam  Vitals and nursing note reviewed  Constitutional:       General: He is not in acute distress  Appearance: He is well-developed  He is not diaphoretic  Comments: Uncomfortable appearing but nontoxic nondistressed   HENT:      Head: Normocephalic and atraumatic  Comments: Moist mucous membranes  Not actively vomiting     Right Ear: External ear normal       Left Ear: External ear normal       Nose: Nose normal       Mouth/Throat:      Pharynx: No oropharyngeal exudate  Eyes:      Conjunctiva/sclera: Conjunctivae normal       Pupils: Pupils are equal, round, and reactive to light  Cardiovascular:      Rate and Rhythm: Regular rhythm  Tachycardia present  Heart sounds: Normal heart sounds  No murmur heard  No friction rub  No gallop  Comments: Sinus tachycardia rate of 115  No murmurs rubs or gallops  Extremities warm and well-perfused without mottling  Pulmonary:      Effort: Pulmonary effort is normal  No respiratory distress  Breath sounds: Normal breath sounds  No wheezing  Comments: No increased work of breathing  Speaking complete sentences  Lungs clear to auscultation bilaterally without wheezes, rales, rhonchi  Satting 100% on room air indicating adequate oxygenation  Chest:      Chest wall: No tenderness  Abdominal:      General: Bowel sounds are normal  There is no distension  Palpations: Abdomen is soft  There is no mass  Tenderness: There is abdominal tenderness  There is no guarding or rebound  Comments: Abdomen nondistended with normal bowel sounds  Ostomy bag in place with nonbloody output  Epigastric and left lower quadrant abdominal pain without rigidity, rebound, guarding   Musculoskeletal:         General: No deformity  Skin:     General: Skin is warm and dry  Capillary Refill: Capillary refill takes less than 2 seconds  Neurological:      Mental Status: He is alert and oriented to person, place, and time        Comments: AAOx3   Psychiatric:         Behavior: Behavior normal          Vital Signs  ED Triage Vitals   Temperature Pulse Respirations Blood Pressure SpO2   03/31/23 1315 03/31/23 1315 03/31/23 1315 03/31/23 1315 03/31/23 1315   (!) 102 2 °F (39 °C) (!) 130 22 139/87 100 %      Temp Source Heart Rate Source Patient Position - Orthostatic VS BP Location FiO2 (%)   03/31/23 1315 03/31/23 1315 -- 03/31/23 1545 --   Tympanic Monitor  Right arm       Pain Score       03/31/23 1315       8           Vitals:    03/31/23 1730 03/31/23 1800 03/31/23 1815 03/31/23 1830   BP: 152/90   129/77   Pulse: (!) 118 (!) 110 (!) 110 105         Visual Acuity      ED Medications  Medications   sodium chloride 0 9 % bolus 1,000 mL (0 mL Intravenous Stopped 3/31/23 1707)   ondansetron (ZOFRAN) injection 4 mg (4 mg Intravenous Given 3/31/23 1456)   HYDROmorphone (DILAUDID) injection 2 mg (2 mg Intravenous Given 3/31/23 1455)   vancomycin (VANCOCIN) 2,000 mg in sodium chloride 0 9 % 500 mL IVPB (0 mg Intravenous Stopped 3/31/23 1935)   cefepime (MAXIPIME) IVPB (premix in dextrose) 2,000 mg 50 mL (0 mg Intravenous Stopped 3/31/23 1545)   sodium chloride 0 9 % bolus 1,000 mL (0 mL Intravenous Stopped 3/31/23 1903)   iohexol (OMNIPAQUE) 240 MG/ML solution 50 mL (50 mL Oral Given 3/31/23 1712)   iohexol (OMNIPAQUE) 350 MG/ML injection (SINGLE-DOSE) 100 mL (100 mL Intravenous Given 3/31/23 1712)   HYDROmorphone (DILAUDID) injection 2 mg (2 mg Intravenous Given 3/31/23 1742)   sodium chloride 0 9 % bolus 1,000 mL (0 mL Intravenous Stopped 3/31/23 1946)       Diagnostic Studies  Results Reviewed     Procedure Component Value Units Date/Time    COVID/FLU/RSV [[id number]]  (Normal) Collected: 03/31/23 1752    Lab Status: Final result Specimen: Nares from Nose Updated: 03/31/23 1845     SARS-CoV-2 Negative     INFLUENZA A PCR Negative     INFLUENZA B PCR Negative     RSV PCR Negative    Narrative:      FOR PEDIATRIC PATIENTS - copy/paste COVID Guidelines URL to browser: [email or web address]  [employer]    SARS-CoV-2 assay is a Nucleic Acid Amplification assay intended for the  qualitative detection of nucleic acid from SARS-CoV-2 in nasopharyngeal  swabs  Results are for the presumptive identification of SARS-CoV-2 RNA  Positive results are indicative of infection with SARS-CoV-2, the virus  causing COVID-19, but do not rule out bacterial infection or co-infection  with other viruses  Laboratories within the [place] and its  territories are required to report all positive results to the appropriate  public health authorities  Negative results do not preclude SARS-CoV-2  infection and should not be used as the sole basis for treatment or other  patient management decisions  Negative results must be combined with  clinical observations, patient history, and epidemiological information  This test has not been FDA cleared or approved  This test has been authorized by FDA under an Emergency Use Authorization  (EUA)  This test is only authorized for the duration of time the  declaration that circumstances exist justifying the authorization of the  emergency use of an in vitro diagnostic tests for detection of SARS-CoV-2  virus and/or diagnosis of COVID-19 infection under section 564(b)(1) of  the Act, 21 U  S C  600KFW-1(B)(0), unless the authorization is terminated  or revoked sooner  The test has been validated but independent review by FDA  and CLIA is pending  Test performed using [employer]: This RT-PCR assay targets N2,  a region unique to SARS-CoV-2  A conserved region in the E-gene was chosen  for pan-Sarbecovirus detection which includes SARS-CoV-2  According to CMS-2020-01-R, this platform meets the definition of high-throughput technology      Urine Microscopic [[id number]]  (Abnormal) Collected: 03/31/23 1752    Lab Status: Final result Specimen: Urine, Other Updated: 03/31/23 1820     RBC, UA None Seen /hpf      WBC, UA None Seen /hpf      Epithelial Cells Occasional /hpf      Bacteria, UA None Seen /hpf      MUCUS THREADS Occasional    UA w Reflex to Microscopic w Reflex to Culture [[id number]]  (Abnormal) Collected: 03/31/23 1752    Lab Status: Final result Specimen: Urine, Other Updated: 03/31/23 1805     Color, UA Colorless     Clarity, UA Clear     Specific Gravity, UA <=1 005     pH, UA 6 0     Leukocytes, UA Negative     Nitrite, UA Negative     Protein, UA Negative mg/dl      Glucose, UA Negative mg/dl      Ketones, UA Negative mg/dl      Urobilinogen, UA 0 2 E U /dl      Bilirubin, UA Negative     Occult Blood, UA Trace-Intact    Blood culture #2 [[id number]] Collected: 03/31/23 1441    Lab Status: Preliminary result Specimen: Blood from Arm, Left Updated: 03/31/23 1701     Blood Culture Received in Microbiology Lab  Culture in Progress  Procalcitonin [[id number]]  (Normal) Collected: 03/31/23 1441    Lab Status: Final result Specimen: Blood from Arm, Left Updated: 03/31/23 1519     Procalcitonin 0 14 ng/ml     Blood culture #1 [[id number]] Collected: 03/31/23 1505    Lab Status:  In process Specimen: Blood from Hand, Right Updated: 03/31/23 1516    Comprehensive metabolic panel [[id number]]  (Abnormal) Collected: 03/31/23 1441    Lab Status: Final result Specimen: Blood from Arm, Left Updated: 03/31/23 1513     Sodium 136 mmol/L      Potassium 3 6 mmol/L      Chloride 102 mmol/L      CO2 24 mmol/L      ANION GAP 10 mmol/L      BUN 10 mg/dL      Creatinine 0 91 mg/dL      Glucose 82 mg/dL      Calcium 9 5 mg/dL      AST 36 U/L      ALT 68 U/L      Alkaline Phosphatase 101 U/L      Total Protein 7 7 g/dL      Albumin 4 7 g/dL      Total Bilirubin 0 79 mg/dL      eGFR 113 ml/min/1 73sq m     Narrative:      Luisa guidelines for Chronic Kidney Disease (CKD):   •  Stage 1 with normal or high GFR (GFR > 90 mL/min/1 73 square meters)  •  Stage 2 Mild CKD (GFR = 60-89 mL/min/1 73 square meters)  •  Stage 3A Moderate CKD (GFR = 45-59 mL/min/1 73 square meters)  •  Stage 3B Moderate CKD (GFR = 30-44 mL/min/1 73 square meters)  •  Stage 4 Severe CKD (GFR = 15-29 mL/min/1 73 square meters)  •  Stage 5 End Stage CKD (GFR <15 mL/min/1 73 square meters)  Note: GFR calculation is accurate only with a steady state creatinine    Lactic acid [[id number]]  (Normal) Collected: 03/31/23 1441    Lab Status: Final result Specimen: Blood from Arm, Left Updated: 03/31/23 1511     LACTIC ACID 1 3 mmol/L     Narrative:      Result may be elevated if tourniquet was used during collection  Protime-INR [[id number]]  (Normal) Collected: 03/31/23 1441    Lab Status: Final result Specimen: Blood from Arm, Left Updated: 03/31/23 1504     Protime 12 8 seconds      INR 0 95    APTT [[id number]]  (Normal) Collected: 03/31/23 1441    Lab Status: Final result Specimen: Blood from Arm, Left Updated: 03/31/23 1504     PTT 28 seconds     CBC and differential [[id number]]  (Abnormal) Collected: 03/31/23 1441    Lab Status: Final result Specimen: Blood from Arm, Left Updated: 03/31/23 1451     WBC 8 69 Thousand/uL      RBC 6 03 Million/uL      Hemoglobin 11 3 g/dL      Hematocrit 37 3 %      MCV 62 fL      MCH 18 7 pg      MCHC 30 3 g/dL      RDW 18 1 %      MPV 8 6 fL      Platelets 908 Thousands/uL      nRBC 0 /100 WBCs      Neutrophils Relative 74 %      Immat GRANS % 0 %      Lymphocytes Relative 10 %      Monocytes Relative 13 %      Eosinophils Relative 2 %      Basophils Relative 1 %      Neutrophils Absolute 6 48 Thousands/µL      Immature Grans Absolute 0 03 Thousand/uL      Lymphocytes Absolute 0 84 Thousands/µL      Monocytes Absolute 1 12 Thousand/µL      Eosinophils Absolute 0 13 Thousand/µL      Basophils Absolute 0 09 Thousands/µL                  CT abdomen pelvis with contrast   Final Result by Delilah Renae MD (03/31 1900)      1) Patchy left lower lobe opacity centrally, new from 3/17/2023, suggestive of pneumonia  2) Status post proctocolectomy with J-pouch creation and diverting right lower quadrant ileostomy  No bowel obstruction    Oral contrast has reached the ileostomy  3) Essentially unchanged appearance of the J-pouch, with the pouch and the ileal efferent limb distended with fluid, and the ileal afferent limb demonstrating circumferential wall thickening, which may be related to pouchitis versus underdistention  4) No evidence of inflammation elsewhere in the gastrointestinal tract  5) No other acute abdominal or pelvic pathology  6) Enlarged right external iliac lymph node unchanged dating back to at least 2019, likely reactive  7) Additional findings as above  Workstation performed: [id number]                    Procedures  Procedures         ED Course                                             Medical Decision Making  I obtained history from the patient  I reviewed external medical documentation noting recent brief hospital admission on 3/11/2023 for abdominal pain and multiple additional ER visits for similar  Differential diagnosis includes but is not limited to bowel obstruction, bowel perforation, colitis, intra-abdominal infection or abscess, viral syndrome  I ordered and reviewed labs including CBC, CMP, procalcitonin, lactate, blood cultures, COVID swab  I ordered and independently interpreted an EKG to evaluate for arrhythmia  Patient with sinus tachycardia rate of 111 no ST or T wave abnormalities per my independent interpretation  Patient's labs reassuring without significant leukocytosis, anemia, and with a normal lactate  Patient treated with multiple doses of Dilaudid with improvement of pain  CT without evidence of any acute intra-abdominal findings however noted to have a patchy left lower lobe opacity concerning for pneumonia  Patient treated with broad-spectrum antibiotics bank/cefepime, Rocephin prior to this result  Patient well-appearing in no respiratory distress on reassessment    Informed of imaging findings, offered admission versus discharge and oral antibiotics, stating that he feels good and desiring discharge  Discharged with return precautions  Amount and/or Complexity of Data Reviewed  Labs: ordered  Radiology: ordered  Risk  Prescription drug management  Disposition  Final diagnoses:   Abdominal pain   Community acquired pneumonia     Time reflects when diagnosis was documented in both MDM as applicable and the Disposition within this note     Time User Action Codes Description Comment    3/31/2023  7:17 PM Sharlon Houston Add [R10 9] Abdominal pain     3/31/2023  7:17 PM Sharlon Houston Add [J18 9] Community acquired pneumonia       ED Disposition     ED Disposition   Discharge    Condition   Stable    Date/Time   Fri Mar 31, 2023  7:16 PM    [address] discharge to home/self care                 Follow-up Information     Follow up With Specialties Details Why Contact Info Additional Information    [facility] Emergency Department Emergency Medicine  If symptoms worsen [address] [address] Emergency Department, [place], [place], [address] [place] [address], [address] [place] [address] In 1 week  [facility]  [phone]             Discharge Medication List as of 3/31/2023  7:21 PM      START taking these medications    Details   amoxicillin (AMOXIL) 500 mg capsule Take 2 capsules (1,000 mg total) by mouth 2 (two) times a day for 5 days, Starting Fri 3/31/2023, Until Wed 4/5/2023, Normal      doxycycline hyclate (VIBRAMYCIN) 100 mg capsule Take 1 capsule (100 mg total) by mouth 2 (two) times a day for 5 days, Starting Fri 3/31/2023, Until Wed 4/5/2023, Normal         CONTINUE these medications which have NOT CHANGED    Details   acetaminophen (TYLENOL) 325 mg tablet Take 2 tablets (650 mg total) by mouth every 6 (six) hours as needed for mild pain, headaches or fever, Starting Tue 1/24/2023, No Print DULoxetine (CYMBALTA) 60 mg delayed release capsule Take 1 capsule (60 mg total) by mouth daily, Starting Tue 2/7/2023, Normal      loperamide (IMODIUM) 2 mg capsule Take 1 capsule (2 mg total) by mouth 3 (three) times a day before meals Do not start before March 16, 2023 , Starting u 3/16/2023, Normal      zolpidem (AMBIEN CR) 12 5 MG CR tablet Take 1 tablet (12 5 mg total) by mouth daily at bedtime as needed for sleep Do not use with any opioids, Starting Wed 3/29/2023, Normal      ALPRAZolam (XANAX) 0 5 mg tablet 1-2 po q8hrs prn anxiety, Normal      ondansetron (ZOFRAN-ODT) 4 mg disintegrating tablet Take 1 tablet (4 mg total) by mouth every 6 (six) hours as needed for nausea for up to 3 days, Starting Wed 1/18/2023, Until Thu 2/16/2023 at 2359, Normal             No discharge procedures on file      PDMP Review       Value Time User    PDMP Reviewed  Yes 3/29/2023 11:00 PM Geneva Resendez DO          ED Provider  Electronically Signed by           Aviva Girard MD  03/31/23 3536

## 2023-04-01 ENCOUNTER — HOSPITAL ENCOUNTER (EMERGENCY)
Facility: HOSPITAL | Age: 28
Discharge: HOME | End: 2023-04-02
Attending: EMERGENCY MEDICINE

## 2023-04-01 VITALS
HEART RATE: 102 BPM | RESPIRATION RATE: 18 BRPM | OXYGEN SATURATION: 100 % | TEMPERATURE: 98.4 F | DIASTOLIC BLOOD PRESSURE: 79 MMHG | SYSTOLIC BLOOD PRESSURE: 124 MMHG

## 2023-04-01 DIAGNOSIS — R78.81 POSITIVE BLOOD CULTURE: Primary | ICD-10-CM

## 2023-04-01 LAB
ANION GAP SERPL CALCULATED.3IONS-SCNC: 11 MMOL/L (ref 4–13)
BASOPHILS # BLD AUTO: 0.06 THOUSANDS/ÂΜL (ref 0–0.1)
BASOPHILS NFR BLD AUTO: 1 % (ref 0–1)
BUN SERPL-MCNC: 12 MG/DL (ref 5–25)
CALCIUM SERPL-MCNC: 9.5 MG/DL (ref 8.4–10.2)
CHLORIDE SERPL-SCNC: 98 MMOL/L (ref 96–108)
CO2 SERPL-SCNC: 24 MMOL/L (ref 21–32)
CREAT SERPL-MCNC: 1.07 MG/DL (ref 0.6–1.3)
EOSINOPHIL # BLD AUTO: 0.04 THOUSAND/ÂΜL (ref 0–0.61)
EOSINOPHIL NFR BLD AUTO: 1 % (ref 0–6)
ERYTHROCYTE [DISTWIDTH] IN BLOOD BY AUTOMATED COUNT: 17.3 % (ref 11.6–15.1)
FLUAV RNA RESP QL NAA+PROBE: NEGATIVE
FLUBV RNA RESP QL NAA+PROBE: NEGATIVE
GFR SERPL CREATININE-BSD FRML MDRD: 93 ML/MIN/1.73SQ M
GLUCOSE SERPL-MCNC: 176 MG/DL (ref 65–140)
HCT VFR BLD AUTO: 36.5 % (ref 36.5–49.3)
HGB BLD-MCNC: 11.1 G/DL (ref 12–17)
IMM GRANULOCYTES # BLD AUTO: 0.02 THOUSAND/UL (ref 0–0.2)
IMM GRANULOCYTES NFR BLD AUTO: 0 % (ref 0–2)
LYMPHOCYTES # BLD AUTO: 1.33 THOUSANDS/ÂΜL (ref 0.6–4.47)
LYMPHOCYTES NFR BLD AUTO: 15 % (ref 14–44)
MCH RBC QN AUTO: 18.7 PG (ref 26.8–34.3)
MCHC RBC AUTO-ENTMCNC: 30.4 G/DL (ref 31.4–37.4)
MCV RBC AUTO: 61 FL (ref 82–98)
MONOCYTES # BLD AUTO: 1.09 THOUSAND/ÂΜL (ref 0.17–1.22)
MONOCYTES NFR BLD AUTO: 12 % (ref 4–12)
NEUTROPHILS # BLD AUTO: 6.27 THOUSANDS/ÂΜL (ref 1.85–7.62)
NEUTS SEG NFR BLD AUTO: 71 % (ref 43–75)
NRBC BLD AUTO-RTO: 0 /100 WBCS
PLATELET # BLD AUTO: 337 THOUSANDS/UL (ref 149–390)
PMV BLD AUTO: 8.9 FL (ref 8.9–12.7)
POTASSIUM SERPL-SCNC: 3.2 MMOL/L (ref 3.5–5.3)
PROCALCITONIN SERPL-MCNC: 0.09 NG/ML
RBC # BLD AUTO: 5.94 MILLION/UL (ref 3.88–5.62)
RSV RNA RESP QL NAA+PROBE: NEGATIVE
S AUREUS+CONS DNA BLD POS NAA+NON-PROBE: DETECTED
SARS-COV-2 RNA RESP QL NAA+PROBE: NEGATIVE
SODIUM SERPL-SCNC: 133 MMOL/L (ref 135–147)
WBC # BLD AUTO: 8.81 THOUSAND/UL (ref 4.31–10.16)

## 2023-04-01 RX ORDER — MORPHINE SULFATE 10 MG/ML
8 INJECTION, SOLUTION INTRAMUSCULAR; INTRAVENOUS ONCE
Status: COMPLETED | OUTPATIENT
Start: 2023-04-01 | End: 2023-04-01

## 2023-04-01 RX ORDER — ONDANSETRON 2 MG/ML
4 INJECTION INTRAMUSCULAR; INTRAVENOUS ONCE
Status: COMPLETED | OUTPATIENT
Start: 2023-04-01 | End: 2023-04-01

## 2023-04-01 RX ORDER — MORPHINE SULFATE 4 MG/ML
4 INJECTION, SOLUTION INTRAMUSCULAR; INTRAVENOUS ONCE
Status: DISCONTINUED | OUTPATIENT
Start: 2023-04-01 | End: 2023-04-01

## 2023-04-01 RX ADMIN — MORPHINE SULFATE 8 MG: 10 INJECTION INTRAVENOUS at 22:24

## 2023-04-01 RX ADMIN — ONDANSETRON 4 MG: 2 INJECTION INTRAMUSCULAR; INTRAVENOUS at 22:24

## 2023-04-01 RX ADMIN — SODIUM CHLORIDE 1000 ML: 0.9 INJECTION, SOLUTION INTRAVENOUS at 22:28

## 2023-04-02 LAB
BACTERIA BLD CULT: ABNORMAL
BACTERIA BLD CULT: NORMAL
GRAM STN SPEC: ABNORMAL
L PNEUMO1 AG UR QL IA.RAPID: NEGATIVE
S PNEUM AG UR QL: NEGATIVE

## 2023-04-02 NOTE — ED PROVIDER NOTES
Final Diagnosis:  1  Positive blood culture        Chief Complaint   Patient presents with   • Abdominal Pain     Here yesterday for abd pain, arrives for same c/o was diagnosed with pneumonia yesterday, did not want to be admitted yesterday, did have a temp at home of 102 and has been taking tylenol last time was at 18:00       HPI  Patient w/ long standing abd history  He was here yest w/ abd pain  Got diagnosed w/ pnuemonia by CT abd pelv lower view  Neg procal at time  Cultured  One positive for GPC, likely skin contaminant but presents again w/ continued body aches  Afebrile now after some tylenol  Good output from ostomy  Prior UC, colectomy, pouchititis/sbo  Then ostomy  Plan for revision on 4/16  In [place]  Then started feeling ill yest  Seen here at that time  Diagnosis of pneumonia on CT though symptoms mainly upper abd  EMS reports if applicable: N/A     - Previous charting underwent limited review with attention to last ED visits, labs, ekgs, and prior imaging    Chart review reveals : chart review from prior visit when starting amox and     Admission on 03/31/2023, Discharged on 03/31/2023   Component Date Value Ref Range Status   • WBC 03/31/2023 8 69  4 31 - 10 16 Thousand/uL Final   • RBC 03/31/2023 6 03 (H)  3 88 - 5 62 Million/uL Final   • Hemoglobin 03/31/2023 11 3 (L)  12 0 - 17 0 g/dL Final   • Hematocrit 03/31/2023 37 3  36 5 - 49 3 % Final   • MCV 03/31/2023 62 (L)  82 - 98 fL Final   • MCH 03/31/2023 18 7 (L)  26 8 - 34 3 pg Final   • MCHC 03/31/2023 30 3 (L)  31 4 - 37 4 g/dL Final   • RDW 03/31/2023 18 1 (H)  11 6 - 15 1 % Final   • MPV 03/31/2023 8 6 (L)  8 9 - 12 7 fL Final   • Platelets 36/02/0745 329  149 - 390 Thousands/uL Final   • nRBC 03/31/2023 0  /100 WBCs Final   • Neutrophils Relative 03/31/2023 74  43 - 75 % Final   • Immat GRANS % 03/31/2023 0  0 - 2 % Final   • Lymphocytes Relative 03/31/2023 10 (L)  14 - 44 % Final   • Monocytes Relative 03/31/2023 13 (H)  4 - 12 % Final   • Eosinophils Relative 03/31/2023 2  0 - 6 % Final   • Basophils Relative 03/31/2023 1  0 - 1 % Final   • Neutrophils Absolute 03/31/2023 6 48  1 85 - 7 62 Thousands/µL Final   • Immature Grans Absolute 03/31/2023 0 03  0 00 - 0 20 Thousand/uL Final   • Lymphocytes Absolute 03/31/2023 0 84  0 60 - 4 47 Thousands/µL Final   • Monocytes Absolute 03/31/2023 1 12  0 17 - 1 22 Thousand/µL Final   • Eosinophils Absolute 03/31/2023 0 13  0 00 - 0 61 Thousand/µL Final   • Basophils Absolute 03/31/2023 0 09  0 00 - 0 10 Thousands/µL Final   • Sodium 03/31/2023 136  135 - 147 mmol/L Final   • Potassium 03/31/2023 3 6  3 5 - 5 3 mmol/L Final   • Chloride 03/31/2023 102  96 - 108 mmol/L Final   • CO2 03/31/2023 24  21 - 32 mmol/L Final   • ANION GAP 03/31/2023 10  4 - 13 mmol/L Final   • BUN 03/31/2023 10  5 - 25 mg/dL Final   • Creatinine 03/31/2023 0 91  0 60 - 1 30 mg/dL Final    Standardized to IDMS reference method   • Glucose 03/31/2023 82  65 - 140 mg/dL Final    If the patient is fasting, the ADA then defines impaired fasting glucose as > 100 mg/dL and diabetes as > or equal to 123 mg/dL  • Calcium 03/31/2023 9 5  8 4 - 10 2 mg/dL Final   • AST 03/31/2023 36  13 - 39 U/L Final   • ALT 03/31/2023 68 (H)  7 - 52 U/L Final    Specimen collection should occur prior to Sulfasalazine administration due to the potential for falsely depressed results  • Alkaline Phosphatase 03/31/2023 101  34 - 104 U/L Final   • Total Protein 03/31/2023 7 7  6 4 - 8 4 g/dL Final   • Albumin 03/31/2023 4 7  3 5 - 5 0 g/dL Final   • Total Bilirubin 03/31/2023 0 79  0 20 - 1 00 mg/dL Final    Use of this assay is not recommended for patients undergoing treatment with eltrombopag due to the potential for falsely elevated results  N-acetyl-p-benzoquinone imine (metabolite of Acetaminophen) will generate erroneously low results in samples for patients that have taken an overdose of Acetaminophen     • eGFR 03/31/2023 113  ml/min/1 73sq m Final   • LACTIC ACID 03/31/2023 1 3  0 5 - 2 0 mmol/L Final   • Procalcitonin 03/31/2023 0 14  <=0 25 ng/ml Final    Comment: Suspected Lower Respiratory Tract Infection (LRTI):  - LESS than or EQUAL to 0 25 ng/mL:   low likelihood for bacterial LRTI; antibiotics DISCOURAGED   - GREATER than 0 25 ng/mL:   increased likelihood for bacterial LRTI; antibiotics ENCOURAGED  Suspected Sepsis:  - Strongly consider initiating antibiotics in ALL UNSTABLE patients  - LESS than or EQUAL to 0 5 ng/mL:   low likelihood for bacterial sepsis; antibiotics DISCOURAGED   - GREATER than 0 5 ng/mL:   increased likelihood for bacterial sepsis; antibiotics ENCOURAGED   - GREATER than 2 ng/mL:   high risk for severe sepsis / septic shock; antibiotics strongly ENCOURAGED  Decisions on antibiotic use should not be based solely on Procalcitonin (PCT) levels  If PCT is low but uncertainty exists with stopping antibiotics, repeat PCT in 6-24 hours to confirm the low level  If antibiotics are administered (regardless if initial PCT was high or low), repeat PCT every 1-2 days to consider early antibiotic cessation (when GREATER                            than 80% decrease from the peak OR when PCT drops below designated cutoffs, whichever comes first), so long as the infection is NOT one that typically requires prolonged treatment durations (e g , bone/joint infections, endocarditis, Staph  aureus bacteremia)      Situations of FALSE-POSITIVE Procalcitonin values:  1) Newborns < 67 hours old  2) Massive stress from severe trauma / burns, major surgery, acute pancreatitis, cardiogenic / hemorrhagic shock, sickle cell crisis, or other organ perfusion abnormalities  3) Malaria and some Candidal infections  4) Treatment with agents that stimulate cytokines (e g , OKT3, anti-lymphocyte globulins, alemtuzumab, IL-2, granulocyte transfusion [NOT GCSFs])  5) Chronic renal disease causes elevated baseline levels (consider GREATER than 0 75 ng/mL as an abnormal cut-off); initiating HD/CRRT may cause transient decreases  6) Paraneoplastic syndromes from medullary thyroid or SCLC, some forms of vasculitis, and acute [id number]-[id number]                            disease    Situations of FALSE-NEGATIVE Procalcitonin values:  1) Too early in clinical course for PCT to have reached its peak (may repeat in 6-24 hours to confirm low level)  2) Localized infection WITHOUT systemic (SIRS / sepsis) response (e g , an abscess, osteomyelitis, cystitis)  3) Mycobacteria (e g , Tuberculosis, MAC)  4) Cystic fibrosis exacerbations     • Protime 03/31/2023 12 8  11 6 - 14 5 seconds Final   • INR 03/31/2023 0 95  0 84 - 1 19 Final   • PTT 03/31/2023 28  23 - 37 seconds Final    Therapeutic Heparin Range =  60-90 seconds   • Blood Culture 03/31/2023 No Growth at 24 hrs  Preliminary   • Gram Stain Result 03/31/2023 Gram positive cocci in clusters (A)   Preliminary    Comment: Refer to Blood Culture Identification Panel results                               This is an appended report  These results have been appended to a previously preliminary verified report     • Color, UA 03/31/2023 Colorless   Final   • Clarity, UA 03/31/2023 Clear   Final   • Specific Gravity, UA 03/31/2023 <=1 005  1 000 - 1 030 Final   • pH, UA 03/31/2023 6 0  5 0, 5 5, 6 0, 6 5, 7 0, 7 5, 8 0, 8 5, 9 0 Final   • Leukocytes, UA 03/31/2023 Negative  Negative Final   • Nitrite, UA 03/31/2023 Negative  Negative Final   • Protein, UA 03/31/2023 Negative  Negative mg/dl Final   • Glucose, UA 03/31/2023 Negative  Negative mg/dl Final   • Ketones, UA 03/31/2023 Negative  Negative mg/dl Final   • Urobilinogen, UA 03/31/2023 0 2  0 2, 1 0 E U /dl E U /dl Final   • Bilirubin, UA 03/31/2023 Negative  Negative Final   • Occult Blood, UA 03/31/2023 Trace-Intact (A)  Negative Final   • SARS-CoV-2 03/31/2023 Negative  Negative Final   • INFLUENZA A PCR 03/31/2023 Negative  Negative Final   • INFLUENZA B PCR 03/31/2023 Negative Negative Final   • RSV PCR 03/31/2023 Negative  Negative Final   • RBC, UA 03/31/2023 None Seen  None Seen, 0-1, 1-2, 2-4, 0-5 /hpf Final   • WBC, UA 03/31/2023 None Seen  None Seen, 0-1, 1-2, 0-5, 2-4 /hpf Final   • Epithelial Cells 03/31/2023 Occasional  None Seen, Occasional /hpf Final   • Bacteria, UA 03/31/2023 None Seen  None Seen, Occasional /hpf Final   • MUCUS THREADS 03/31/2023 Occasional (A)  None Seen Final   • Ventricular Rate 03/31/2023 111  BPM Final   • Atrial Rate 03/31/2023 111  BPM Final   • [place] Interval 03/31/2023 160  ms Final   • QRSD Interval 03/31/2023 86  ms Final   • QT Interval 03/31/2023 326  ms Final   • QTC Interval 03/31/2023 443  ms Final   • P Axis 03/31/2023 41  degrees Final   • QRS Axis 03/31/2023 -10  degrees Final   • T Wave Axis 03/31/2023 27  degrees Final   • Staphylococcus 03/31/2023 Detected (A)  Not Detected Preliminary    This organism is a coagulase-negative Staphylococcus    If only 1 set of blood cultures is positive, this may represent a contaminant  Consider holding antibiotics or repeating cultures prior to starting antibiotics  If >1 one set of blood cultures is positive, vancomycin is the preferred therapy  - No language barrier    - History obtained from patient   - There are no limitations to the history obtained  -     PMH:   has a past medical history of Ankylosing spondylitis ([facility] [address] ), Anxiety, Bowel obstruction ([facility] [address] ), Clostridium difficile colitis (9/13/2018), Colitis, 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[facility] [address] ) (9/13/2018), Pancreatitis, and Ulcerative colitis ([facility] [address] )  PSH:   has a past surgical history that includes Total colectomy; COLECTOMY TOTAL; and IR PICC placement single lumen (3/1/2022)  Social History:  Tobacco Use: Low Risk    • Smoking Tobacco Use: Never   • Smokeless Tobacco Use: Never   • Passive Exposure: Not on file     Alcohol Use: Not on file     No illicit use       ROS:  Pertinent positives/negatives: Silvia Session      Some ROS may be present in the HPI and would take precedent over these standard questions asked below  Review of Systems   Constitutional: Positive for fatigue and fever  Gastrointestinal: Positive for abdominal pain and nausea  CONSTITUTIONAL:  No lethargy  No weakness  No unexpected weight loss  No appetite change  EYES:  No pain, redness, or discharge  No loss of vision  No orbital trauma or pain  ENT:  No tinnitus or decreased hearing  No epistaxis/purulent rhinorrhea  No voice change, airway closing, trismus  CARDIOVASCULAR:  No chest pain  No skin mottling or pallor  No change in exertional capacity  RESPIRATORY:  No hemoptysis  No paroxysmal nocturnal dyspnea  No stridor  No audible wheezing  No production with cough  GASTROINTESTINAL:  Normal appetite  No vomiting, diarrhea  No pain  No bloating  No melena  No hematochezia  GENITOURINARY:  No frequency, urgency, nocturia  No hematuria or dysuria  No discharge  No sores/adenopathy  MUSCULOSKELETAL:  No arthralgias or myalgias that are new  No new deformity  INTEGUMENTARY:  No swelling  No unexpected contusions  No abrasions  No lymphangitis  NEUROLOGIC:  No meningismus  No new numbness of the extremities  No new focal weakness  No postural instability  PSYCHIATRIC:  No SI HI AVH  HEMATOLOGICAL:  No bleeding  No petechiae  No bruising  ALLERGIES:  No urticaria  No sudden abd cramping  No stridor  PE:     Physical exam highlights:   Physical Exam       Vitals:    04/01/23 2149 04/01/23 2323 04/01/23 2330   BP: 136/84 127/78 124/79   BP Location: Right arm Right arm    Pulse: (!) 111 94 102   Resp: 18 18 18   Temp: 98 4 °F (36 9 °C)     SpO2: 99% 98% 100%     Vitals reviewed by me  Nursing note reviewed  Chaperone present for all sensitive exam   Const: No acute distress  Alert  Nontoxic  Not diaphoretic  HEENT: External ears normal  No protrusion drainage swelling  Nose normal  No drainage/traumatic deformity  MMM  Mouth with baseline/symmetric movement   No trismus  Eyes: No squinting  No icterus  No tearing/swelling/drainage  Tracks through the room with normal EOM  Neck: ROM normal  No rigidity  No meningismus  Cards: Rate as per vitals Compared to monitor sinus unless documented  Regular Well perfused  Pulm: able to verbalize without additional effort  Effort and excursion normal  No distress  No audible wheezing/ stridor  Normal resp rate without retraction or change in work of breathing  Abd: No distension beyond baseline  No fluctuant wave  Patient without peritoneal pain with shifting/bumping the bed  Ostomy w/ good output  No change  MSK: ROM normal baseline  No deformity  No contractures from baseline  Skin: No new rashes visible  Well perfused  No wounds visualized on exposed skin  Neuro: Nonfocal  Baseline  CN grossly intact  Moving all four with coordination  Psych: Normal behavior and affect  A:  - Nursing note reviewed  Ddx and MDM  Considered diagnoses    Pneumonia? Bactereemia? reculture  Continue outpatient abx (amox and doxy)  Would cover GPC    procal remains neg  No resp distress  D/c    Other gastritis/enteritis?  Likely      My conversation with consultant reveals: NA       Decision rules:                           My read of the XR/CT scan reveals:  NA   No orders to display       Orders Placed This Encounter   Procedures   • Blood culture   • Blood culture   • Strep Pneumoniae, Urine   • Legionella antigen, urine   • COVID/FLU/RSV   • CBC and differential   • Basic metabolic panel   • Procalcitonin   • Vital Signs     Labs Reviewed   CBC AND DIFFERENTIAL - Abnormal       Result Value Ref Range Status    WBC 8 81  4 31 - 10 16 Thousand/uL Final    RBC 5 94 (*) 3 88 - 5 62 Million/uL Final    Hemoglobin 11 1 (*) 12 0 - 17 0 g/dL Final    Hematocrit 36 5  36 5 - 49 3 % Final    MCV 61 (*) 82 - 98 fL Final    MCH 18 7 (*) 26 8 - 34 3 pg Final    MCHC 30 4 (*) 31 4 - 37 4 g/dL Final    RDW 17 3 (*) 11 6 - 15 1 % Final    MPV 8 9  8 9 - 12 7 fL Final    Platelets 197  831 - 390 Thousands/uL Final    nRBC 0  /100 WBCs Final    Neutrophils Relative 71  43 - 75 % Final    Immat GRANS % 0  0 - 2 % Final    Lymphocytes Relative 15  14 - 44 % Final    Monocytes Relative 12  4 - 12 % Final    Eosinophils Relative 1  0 - 6 % Final    Basophils Relative 1  0 - 1 % Final    Neutrophils Absolute 6 27  1 85 - 7 62 Thousands/µL Final    Immature Grans Absolute 0 02  0 00 - 0 20 Thousand/uL Final    Lymphocytes Absolute 1 33  0 60 - 4 47 Thousands/µL Final    Monocytes Absolute 1 09  0 17 - 1 22 Thousand/µL Final    Eosinophils Absolute 0 04  0 00 - 0 61 Thousand/µL Final    Basophils Absolute 0 06  0 00 - 0 10 Thousands/µL Final   BASIC METABOLIC PANEL - Abnormal    Sodium 133 (*) 135 - 147 mmol/L Final    Potassium 3 2 (*) 3 5 - 5 3 mmol/L Final    Chloride 98  96 - 108 mmol/L Final    CO2 24  21 - 32 mmol/L Final    ANION GAP 11  4 - 13 mmol/L Final    BUN 12  5 - 25 mg/dL Final    Creatinine 1 07  0 60 - 1 30 mg/dL Final    Comment: Standardized to IDMS reference method    Glucose 176 (*) 65 - 140 mg/dL Final    Comment: If the patient is fasting, the ADA then defines impaired fasting glucose as > 100 mg/dL and diabetes as > or equal to 123 mg/dL      Calcium 9 5  8 4 - 10 2 mg/dL Final    eGFR 93  ml/min/1 73sq m Final    Narrative:     [place] guidelines for Chronic Kidney Disease (CKD):   •  Stage 1 with normal or high GFR (GFR > 90 mL/min/1 73 square meters)  •  Stage 2 Mild CKD (GFR = 60-89 mL/min/1 73 square meters)  •  Stage 3A Moderate CKD (GFR = 45-59 mL/min/1 73 square meters)  •  Stage 3B Moderate CKD (GFR = 30-44 mL/min/1 73 square meters)  •  Stage 4 Severe CKD (GFR = 15-29 mL/min/1 73 square meters)  •  Stage 5 End Stage CKD (GFR <15 mL/min/1 73 square meters)  Note: GFR calculation is accurate only with a steady state creatinine   COVID19, INFLUENZA A/B, RSV PCR, [facility] - Normal    SARS-CoV-2 Negative Negative Final    INFLUENZA A PCR Negative  Negative Final    INFLUENZA B PCR Negative  Negative Final    RSV PCR Negative  Negative Final    Narrative:     FOR PEDIATRIC PATIENTS - copy/paste COVID Guidelines URL to browser: [email or web address]  "[employer]x    SARS-CoV-2 assay is a Nucleic Acid Amplification assay intended for the  qualitative detection of nucleic acid from SARS-CoV-2 in nasopharyngeal  swabs  Results are for the presumptive identification of SARS-CoV-2 RNA  Positive results are indicative of infection with SARS-CoV-2, the virus  causing COVID-19, but do not rule out bacterial infection or co-infection  with other viruses  Laboratories within the [place] and its  territories are required to report all positive results to the appropriate  public health authorities  Negative results do not preclude SARS-CoV-2  infection and should not be used as the sole basis for treatment or other  patient management decisions  Negative results must be combined with  clinical observations, patient history, and epidemiological information  This test has not been FDA cleared or approved  This test has been authorized by FDA under an Emergency Use Authorization  (EUA)  This test is only authorized for the duration of time the  declaration that circumstances exist justifying the authorization of the  emergency use of an in vitro diagnostic tests for detection of SARS-CoV-2  virus and/or diagnosis of COVID-19 infection under section 564(b)(1) of  the Act, 21 U  S C  579CUN-2(C)(4), unless the authorization is terminated  or revoked sooner  The test has been validated but independent review by FDA  and CLIA is pending  Test performed using [employer] Gene[employer]pert: This RT-PCR assay targets N2,  a region unique to SARS-CoV-2  A conserved region in the E-gene was chosen  for pan-Sarbecovirus detection which includes SARS-CoV-2      According to CMS-2020-01-R, this platform meets the definition of high-[employer] technology  PROCALCITONIN TEST - Normal    Procalcitonin 0 09  <=0 25 ng/ml Final    Comment: Suspected Lower Respiratory Tract Infection (LRTI):  - LESS than or EQUAL to 0 25 ng/mL:   low likelihood for bacterial LRTI; antibiotics DISCOURAGED   - GREATER than 0 25 ng/mL:   increased likelihood for bacterial LRTI; antibiotics ENCOURAGED  Suspected Sepsis:  - Strongly consider initiating antibiotics in ALL UNSTABLE patients  - LESS than or EQUAL to 0 5 ng/mL:   low likelihood for bacterial sepsis; antibiotics DISCOURAGED   - GREATER than 0 5 ng/mL:   increased likelihood for bacterial sepsis; antibiotics ENCOURAGED   - GREATER than 2 ng/mL:   high risk for severe sepsis / septic shock; antibiotics strongly ENCOURAGED  Decisions on antibiotic use should not be based solely on Procalcitonin (PCT) levels  If PCT is low but uncertainty exists with stopping antibiotics, repeat PCT in 6-24 hours to confirm the low level  If antibiotics are administered (regardless if initial PCT was high or low), repeat PCT every 1-2 days to consider early antibiotic cessation (when GREATER than 80% decrease from the peak OR when PCT drops below designated cutoffs, whichever comes first), so long as the infection is NOT one that typically requires prolonged treatment durations (e g , bone/joint infections, endocarditis, Staph  aureus bacteremia)      Situations of FALSE-POSITIVE Procalcitonin values:  1) Newborns < 67 hours old  2) Massive stress from severe trauma / burns, major surgery, acute pancreatitis, cardiogenic / hemorrhagic shock, sickle cell crisis, or other organ perfusion abnormalities  3) Malaria and some Candidal infections  4) Treatment with agents that stimulate cytokines (e g , OKT3, anti-lymphocyte globulins, alemtuzumab, IL-2, granulocyte transfusion [NOT GCSFs])  5) Chronic renal disease causes elevated baseline levels (consider GREATER than 0 75 ng/mL as an abnormal cut-off); initiating HD/CRRT may cause transient decreases  6) Paraneoplastic syndromes from medullary thyroid or SCLC, some forms of vasculitis, and acute diadz-wa-qwqo disease    Situations of FALSE-NEGATIVE Procalcitonin values:  1) Too early in clinical course for PCT to have reached its peak (may repeat in 6-24 hours to confirm low level)  2) Localized infection WITHOUT systemic (SIRS / sepsis) response (e g , an abscess, osteomyelitis, cystitis)  3) Mycobacteria (e g , Tuberculosis, MAC)  4) Cystic fibrosis exacerbations     BLOOD CULTURE   BLOOD CULTURE   STREP PNEUMONIAE ANTIGEN,URINE   LEGIONELLA ANTIGEN, URINE       *Each of these labs was reviewed  Particular standout labs will be noted in the ED Course above     Final Diagnosis:  1  Positive blood culture          P:  - hospital tx includes   Medications   sodium chloride 0 9 % bolus 1,000 mL (1,000 mL Intravenous New Bag 4/1/23 2228)   ondansetron (ZOFRAN) injection 4 mg (4 mg Intravenous Given 4/1/23 2224)   morphine injection 8 mg (8 mg Intravenous Given 4/1/23 2224)         - disposition  Time reflects when diagnosis was documented in both MDM as applicable and the Disposition within this note     Time User Action Codes Description Comment    4/1/2023 11:33 PM Eliceo Gregg Add [[id number]] Positive blood culture       ED Disposition     ED Disposition   Discharge    Condition   Stable    Date/Time   Sat Apr 1, 2023 11:33 PM    Comment   Aravind White discharge to home/self care  Follow-up Information     Follow up With Specialties Details Why [address] [facility], DO Family Medicine   [address] [place] [address] 66 [phone]            - patient will call their PCP to let them know they were in the emergency department  We discuss return precautions and patient is agreeable with plan and aformentioned disposition         - additional treatment intended, if consistent with primary provider:  - patient "to follow with :      Patient's Medications   Discharge Prescriptions    No medications on file     No discharge procedures on file  Prior to Admission Medications   Prescriptions Last Dose Informant Patient Reported? Taking? ALPRAZolam (XANAX) 0 5 mg tablet   No No   Si-2 po q8hrs prn anxiety   Patient not taking: Reported on 3/31/2023   DULoxetine (CYMBALTA) 60 mg delayed release capsule   No No   Sig: Take 1 capsule (60 mg total) by mouth daily   acetaminophen (TYLENOL) 325 mg tablet   No No   Sig: Take 2 tablets (650 mg total) by mouth every 6 (six) hours as needed for mild pain, headaches or fever   amoxicillin (AMOXIL) 500 mg capsule   No No   Sig: Take 2 capsules (1,000 mg total) by mouth 2 (two) times a day for 5 days   doxycycline hyclate (VIBRAMYCIN) 100 mg capsule   No No   Sig: Take 1 capsule (100 mg total) by mouth 2 (two) times a day for 5 days   loperamide (IMODIUM) 2 mg capsule   No No   Sig: Take 1 capsule (2 mg total) by mouth 3 (three) times a day before meals Do not start before 2023  ondansetron (ZOFRAN-ODT) 4 mg disintegrating tablet   No No   Sig: Take 1 tablet (4 mg total) by mouth every 6 (six) hours as needed for nausea for up to 3 days   Patient not taking: Reported on 3/31/2023   zolpidem (AMBIEN CR) 12 5 MG CR tablet   No No   Sig: Take 1 tablet (12 5 mg total) by mouth daily at bedtime as needed for sleep Do not use with any opioids      Facility-Administered Medications: None       Portions of the record may have been created with voice recognition software  Occasional wrong word or \"sound a like\" substitutions may have occurred due to the inherent limitations of voice recognition software  Read the chart carefully and recognize, using context, where substitutions have occurred      Electronically signed by:  MD Manuel Pisano MD  23 [id number]    "

## 2023-04-02 NOTE — DISCHARGE INSTRUCTIONS
I don't think you have bacteremia  I bet your blood culture was a skin contaminant  However, answer the phone, continue your antibiotics   If another culture is positive you'll be called back in for admission

## 2023-04-03 LAB
BACTERIA BLD CULT: ABNORMAL
GRAM STN SPEC: ABNORMAL
S AUREUS+CONS DNA BLD POS NAA+NON-PROBE: DETECTED

## 2023-04-05 LAB — BACTERIA BLD CULT: NORMAL

## 2023-04-07 LAB
BACTERIA BLD CULT: NORMAL
BACTERIA BLD CULT: NORMAL

## 2023-05-01 PROBLEM — A41.9 SEPSIS (HCC): Status: RESOLVED | Noted: 2023-01-26 | Resolved: 2023-05-01

## 2023-05-04 ENCOUNTER — HOSPITAL ENCOUNTER (EMERGENCY)
Facility: HOSPITAL | Age: 28
Discharge: HOME | End: 2023-05-04
Attending: EMERGENCY MEDICINE

## 2023-05-04 ENCOUNTER — APPOINTMENT (EMERGENCY)
Dept: RADIOLOGY | Facility: HOSPITAL | Age: 28
End: 2023-05-04

## 2023-05-04 VITALS
TEMPERATURE: 97.8 F | OXYGEN SATURATION: 97 % | BODY MASS INDEX: 24.48 KG/M2 | SYSTOLIC BLOOD PRESSURE: 145 MMHG | RESPIRATION RATE: 22 BRPM | WEIGHT: 165.8 LBS | HEART RATE: 94 BPM | DIASTOLIC BLOOD PRESSURE: 87 MMHG

## 2023-05-04 DIAGNOSIS — R10.9 ABDOMINAL PAIN: Primary | ICD-10-CM

## 2023-05-04 LAB
ALBUMIN SERPL BCP-MCNC: 4.4 G/DL (ref 3.5–5)
ALP SERPL-CCNC: 183 U/L (ref 34–104)
ALT SERPL W P-5'-P-CCNC: 11 U/L (ref 7–52)
ANION GAP SERPL CALCULATED.3IONS-SCNC: 10 MMOL/L (ref 4–13)
AST SERPL W P-5'-P-CCNC: 11 U/L (ref 13–39)
BASOPHILS # BLD AUTO: 0.11 THOUSANDS/ΜL (ref 0–0.1)
BASOPHILS NFR BLD AUTO: 1 % (ref 0–1)
BILIRUB SERPL-MCNC: 0.7 MG/DL (ref 0.2–1)
BUN SERPL-MCNC: 9 MG/DL (ref 5–25)
CALCIUM SERPL-MCNC: 10.3 MG/DL (ref 8.4–10.2)
CHLORIDE SERPL-SCNC: 98 MMOL/L (ref 96–108)
CO2 SERPL-SCNC: 27 MMOL/L (ref 21–32)
CREAT SERPL-MCNC: 0.85 MG/DL (ref 0.6–1.3)
EOSINOPHIL # BLD AUTO: 0.08 THOUSAND/ΜL (ref 0–0.61)
EOSINOPHIL NFR BLD AUTO: 1 % (ref 0–6)
ERYTHROCYTE [DISTWIDTH] IN BLOOD BY AUTOMATED COUNT: 19.2 % (ref 11.6–15.1)
GFR SERPL CREATININE-BSD FRML MDRD: 116 ML/MIN/1.73SQ M
GLUCOSE SERPL-MCNC: 90 MG/DL (ref 65–140)
HCT VFR BLD AUTO: 38.7 % (ref 36.5–49.3)
HGB BLD-MCNC: 11.7 G/DL (ref 12–17)
IMM GRANULOCYTES # BLD AUTO: 0.13 THOUSAND/UL (ref 0–0.2)
IMM GRANULOCYTES NFR BLD AUTO: 1 % (ref 0–2)
LACTATE SERPL-SCNC: 1.1 MMOL/L (ref 0.5–2)
LIPASE SERPL-CCNC: 21 U/L (ref 11–82)
LYMPHOCYTES # BLD AUTO: 1.13 THOUSANDS/ΜL (ref 0.6–4.47)
LYMPHOCYTES NFR BLD AUTO: 7 % (ref 14–44)
MAGNESIUM SERPL-MCNC: 1.8 MG/DL (ref 1.9–2.7)
MCH RBC QN AUTO: 19.1 PG (ref 26.8–34.3)
MCHC RBC AUTO-ENTMCNC: 30.2 G/DL (ref 31.4–37.4)
MCV RBC AUTO: 63 FL (ref 82–98)
MONOCYTES # BLD AUTO: 0.73 THOUSAND/ΜL (ref 0.17–1.22)
MONOCYTES NFR BLD AUTO: 4 % (ref 4–12)
NEUTROPHILS # BLD AUTO: 14.41 THOUSANDS/ΜL (ref 1.85–7.62)
NEUTS SEG NFR BLD AUTO: 86 % (ref 43–75)
NRBC BLD AUTO-RTO: 0 /100 WBCS
PLATELET # BLD AUTO: 908 THOUSANDS/UL (ref 149–390)
PMV BLD AUTO: 8.3 FL (ref 8.9–12.7)
POTASSIUM SERPL-SCNC: 4 MMOL/L (ref 3.5–5.3)
PROT SERPL-MCNC: 8.4 G/DL (ref 6.4–8.4)
RBC # BLD AUTO: 6.13 MILLION/UL (ref 3.88–5.62)
SODIUM SERPL-SCNC: 135 MMOL/L (ref 135–147)
WBC # BLD AUTO: 16.59 THOUSAND/UL (ref 4.31–10.16)

## 2023-05-04 RX ORDER — HYDROMORPHONE HCL/PF 1 MG/ML
1 SYRINGE (ML) INJECTION ONCE
Status: COMPLETED | OUTPATIENT
Start: 2023-05-04 | End: 2023-05-04

## 2023-05-04 RX ORDER — MAGNESIUM SULFATE HEPTAHYDRATE 40 MG/ML
2 INJECTION, SOLUTION INTRAVENOUS ONCE
Status: COMPLETED | OUTPATIENT
Start: 2023-05-04 | End: 2023-05-04

## 2023-05-04 RX ORDER — HYDROMORPHONE HCL 110MG/55ML
2 PATIENT CONTROLLED ANALGESIA SYRINGE INTRAVENOUS ONCE
Status: COMPLETED | OUTPATIENT
Start: 2023-05-04 | End: 2023-05-04

## 2023-05-04 RX ORDER — ONDANSETRON 2 MG/ML
4 INJECTION INTRAMUSCULAR; INTRAVENOUS ONCE
Status: COMPLETED | OUTPATIENT
Start: 2023-05-04 | End: 2023-05-04

## 2023-05-04 RX ADMIN — HYDROMORPHONE HYDROCHLORIDE 2 MG: 2 INJECTION, SOLUTION INTRAMUSCULAR; INTRAVENOUS; SUBCUTANEOUS at 13:25

## 2023-05-04 RX ADMIN — SODIUM CHLORIDE 1000 ML: 0.9 INJECTION, SOLUTION INTRAVENOUS at 14:23

## 2023-05-04 RX ADMIN — IOHEXOL 100 ML: 350 INJECTION, SOLUTION INTRAVENOUS at 16:49

## 2023-05-04 RX ADMIN — MAGNESIUM SULFATE HEPTAHYDRATE 2 G: 40 INJECTION, SOLUTION INTRAVENOUS at 14:32

## 2023-05-04 RX ADMIN — HYDROMORPHONE HYDROCHLORIDE 1 MG: 1 INJECTION, SOLUTION INTRAMUSCULAR; INTRAVENOUS; SUBCUTANEOUS at 18:09

## 2023-05-04 RX ADMIN — IOHEXOL 50 ML: 240 INJECTION, SOLUTION INTRATHECAL; INTRAVASCULAR; INTRAVENOUS; ORAL at 14:47

## 2023-05-04 RX ADMIN — HYDROMORPHONE HYDROCHLORIDE 1 MG: 1 INJECTION, SOLUTION INTRAMUSCULAR; INTRAVENOUS; SUBCUTANEOUS at 15:27

## 2023-05-04 RX ADMIN — ONDANSETRON 4 MG: 2 INJECTION INTRAMUSCULAR; INTRAVENOUS at 13:25

## 2023-05-04 NOTE — ED PROVIDER NOTES
History  Chief Complaint   Patient presents with    Abdominal Pain     States had a new J pouch created  at [facility]  States fever, severe pain and nausea     Fever - 9 weeks to 74 years     Patient presents for evaluation of severe abdominal pain with low-grade fever reported as 100 at home with nausea  No vomiting and still has output from ileostomy  Patient has a complicated medical and surgical history with multiple abdominal surgeries and complications  Patient recently underwent a revision at [facility] on  that involve lysis of adhesions takedown of the ileal pouch as well as a resection of part of the small intestines  Patient was discharged 3 days ago from [facility]  History provided by:  Patient   used: No    Abdominal Pain  Associated symptoms: fever and nausea    Associated symptoms: no vomiting    Fever - 9 weeks to 74 years  Associated symptoms: nausea    Associated symptoms: no vomiting        Prior to Admission Medications   Prescriptions Last Dose Informant Patient Reported? Taking? ALPRAZolam (XANAX) 0 5 mg tablet   No No   Si-2 po q8hrs prn anxiety   Patient not taking: Reported on 3/31/2023   DULoxetine (CYMBALTA) 60 mg delayed release capsule   No No   Sig: Take 1 capsule (60 mg total) by mouth daily   acetaminophen (TYLENOL) 325 mg tablet   No No   Sig: Take 2 tablets (650 mg total) by mouth every 6 (six) hours as needed for mild pain, headaches or fever   loperamide (IMODIUM) 2 mg capsule   No No   Sig: Take 1 capsule (2 mg total) by mouth 3 (three) times a day before meals Do not start before 2023     ondansetron (ZOFRAN-ODT) 4 mg disintegrating tablet   No No   Sig: Take 1 tablet (4 mg total) by mouth every 6 (six) hours as needed for nausea for up to 3 days   Patient not taking: Reported on 3/31/2023   zolpidem (AMBIEN CR) 12 5 MG CR tablet   No No   Sig: Take 1 tablet (12 5 mg total) by mouth daily at bedtime as needed for sleep Do not use with any opioids      Facility-Administered Medications: None       Past Medical History:   Diagnosis Date    [facility]yl[facility]yl[facility] ([facility] )     Anxiety     Bowel obstruction (HCC)     Clostridium difficile colitis 9/13/2018    Colitis     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[facility] ) 9/13/2018    Pancreatitis     Ulcerative colitis ([facility] )        Past Surgical History:   Procedure Laterality Date    COLECTOMY TOTAL      with ileal pouch and anastemosis    IR PICC PLACEMENT SINGLE LUMEN  3/1/2022    TOTAL COLECTOMY         Family History   Problem Relation Age of Onset    Lung disease Neg Hx      I have reviewed and agree with the history as documented  E-Cigarette/Vaping    E-Cigarette Use Never User      E-Cigarette/Vaping Substances    Nicotine No     THC No     CBD No     Flavoring No     Other No     Unknown No      Social History     Tobacco Use    Smoking status: Never    Smokeless tobacco: Never   Vaping Use    Vaping Use: Never used   Substance Use Topics    Alcohol use: Not Currently     Comment: pt states 5 a month/socially    Drug use: No       Review of Systems   Constitutional: Positive for fever  Gastrointestinal: Positive for abdominal pain and nausea  Negative for vomiting  All other systems reviewed and are negative  Physical Exam  Physical Exam  Vitals and nursing note reviewed  Constitutional:       General: He is in acute distress  HENT:      Head: Atraumatic  Right Ear: External ear normal       Left Ear: External ear normal       Nose: Nose normal       Mouth/Throat:      Mouth: Mucous membranes are moist       Pharynx: Oropharynx is clear  Eyes:      General: No scleral icterus  Conjunctiva/sclera: Conjunctivae normal    Cardiovascular:      Rate and Rhythm: Normal rate and regular rhythm  Pulmonary:      Effort: Pulmonary effort is normal  No respiratory distress  Breath sounds: Normal breath sounds  Abdominal:      General: There is no distension        Tenderness: There is abdominal tenderness  There is no guarding or rebound  Comments: Diffuse tenderness, ileostomy with output   Musculoskeletal:         General: No deformity  Normal range of motion  Skin:     Capillary Refill: Capillary refill takes less than 2 seconds  Findings: No rash  Neurological:      General: No focal deficit present  Mental Status: He is alert and oriented to person, place, and time  Vital Signs  ED Triage Vitals [05/04/23 1223]   Temperature Pulse Respirations Blood Pressure SpO2   97 8 °F (36 6 °C) (!) 110 20 128/77 100 %      Temp Source Heart Rate Source Patient Position - Orthostatic VS BP Location FiO2 (%)   Oral Monitor Sitting Right arm --      Pain Score       9           Vitals:    05/04/23 1754 05/04/23 1809 05/04/23 1815 05/04/23 1830   BP:  128/76  145/87   Pulse: 91 100 97 94   Patient Position - Orthostatic VS:             Visual Acuity      ED Medications  Medications   sodium chloride 0 9 % bolus 1,000 mL (0 mL Intravenous Stopped 5/4/23 1723)   ondansetron (ZOFRAN) injection 4 mg (4 mg Intravenous Given 5/4/23 1325)   HYDROmorphone (DILAUDID) injection 2 mg (2 mg Intravenous Given 5/4/23 1325)   iohexol (OMNIPAQUE) 240 MG/ML solution 50 mL (50 mL Oral Given 5/4/23 1447)   magnesium sulfate 2 g/50 mL IVPB (premix) 2 g (0 g Intravenous Stopped 5/4/23 1525)   HYDROmorphone (DILAUDID) injection 1 mg (1 mg Intravenous Given 5/4/23 1527)   iohexol (OMNIPAQUE) 350 MG/ML injection (SINGLE-DOSE) 100 mL (100 mL Intravenous Given 5/4/23 1649)   HYDROmorphone (DILAUDID) injection 1 mg (1 mg Intravenous Given 5/4/23 1809)       Diagnostic Studies  Results Reviewed     Procedure Component Value Units Date/Time    Blood culture #1 [[id number]] Collected: 05/04/23 1327    Lab Status: Preliminary result Specimen: Blood from Hand, Left Updated: 05/04/23 2001     Blood Culture Received in Microbiology Lab  Culture in Progress      Blood culture #2 [[id number]] Collected: 05/04/23 1317    Lab Status: Preliminary result Specimen: Blood from Arm, Right Updated: 05/04/23 2001     Blood Culture Received in Microbiology Lab  Culture in Progress  Comprehensive metabolic panel [[id number]]  (Abnormal) Collected: 05/04/23 1317    Lab Status: Final result Specimen: Blood from Arm, Right Updated: 05/04/23 1346     Sodium 135 mmol/L      Potassium 4 0 mmol/L      Chloride 98 mmol/L      CO2 27 mmol/L      ANION GAP 10 mmol/L      BUN 9 mg/dL      Creatinine 0 85 mg/dL      Glucose 90 mg/dL      Calcium 10 3 mg/dL      AST 11 U/L      ALT 11 U/L      Alkaline Phosphatase 183 U/L      Total Protein 8 4 g/dL      Albumin 4 4 g/dL      Total Bilirubin 0 70 mg/dL      eGFR 116 ml/min/1 73sq m     Narrative:      [place] guidelines for Chronic Kidney Disease (CKD):     Stage 1 with normal or high GFR (GFR > 90 mL/min/1 73 square meters)    Stage 2 Mild CKD (GFR = 60-89 mL/min/1 73 square meters)    Stage 3A Moderate CKD (GFR = 45-59 mL/min/1 73 square meters)    Stage 3B Moderate CKD (GFR = 30-44 mL/min/1 73 square meters)    Stage 4 Severe CKD (GFR = 15-29 mL/min/1 73 square meters)    Stage 5 End Stage CKD (GFR <15 mL/min/1 73 square meters)  Note: GFR calculation is accurate only with a steady state creatinine    Lipase [[id number]]  (Normal) Collected: 05/04/23 1317    Lab Status: Final result Specimen: Blood from Arm, Right Updated: 05/04/23 1346     Lipase 21 u/L     Lactic acid, plasma (w/reflex if result > 2 0) [[id number]]  (Normal) Collected: 05/04/23 1317    Lab Status: Final result Specimen: Blood from Arm, Right Updated: 05/04/23 1346     LACTIC ACID 1 1 mmol/L     Narrative:      Result may be elevated if tourniquet was used during collection      Magnesium [[id number]]  (Abnormal) Collected: 05/04/23 1317    Lab Status: Final result Specimen: Blood from Arm, Right Updated: 05/04/23 1346     Magnesium 1 8 mg/dL     CBC and differential [[id number]] (Abnormal) Collected: 05/04/23 1317    Lab Status: Final result Specimen: Blood from Arm, Right Updated: 05/04/23 1325     WBC 16 59 Thousand/uL      RBC 6 13 Million/uL      Hemoglobin 11 7 g/dL      Hematocrit 38 7 %      MCV 63 fL      MCH 19 1 pg      MCHC 30 2 g/dL      RDW 19 2 %      MPV 8 3 fL      Platelets 548 Thousands/uL      nRBC 0 /100 WBCs      Neutrophils Relative 86 %      Immat GRANS % 1 %      Lymphocytes Relative 7 %      Monocytes Relative 4 %      Eosinophils Relative 1 %      Basophils Relative 1 %      Neutrophils Absolute 14 41 Thousands/µL      Immature Grans Absolute 0 13 Thousand/uL      Lymphocytes Absolute 1 13 Thousands/µL      Monocytes Absolute 0 73 Thousand/µL      Eosinophils Absolute 0 08 Thousand/µL      Basophils Absolute 0 11 Thousands/µL                  CT abdomen pelvis with contrast   Final Result by lAis Bianchi MD (05/04 1741)      1  Status post proctocolectomy with J-pouch and a right lower quadrant diverting ileostomy  Mildly dilated loops of small bowel without a discrete transition point or abrupt caliber change, most suggestive of postoperative adynamic ileus  2  Postsurgical changes of the anterior abdominopelvic wall with a small ill-defined collection in the midline lower pelvic wall, likely representing a postoperative hematoma/seroma  3  Single tiny focus of air in the right hemipelvis, likely postsurgical in etiology  The study was marked in [facility] for immediate notification  Workstation performed: [id number]                    Procedures  Procedures         ED Course                               SBIRT 22yo+    Flowsheet Row Most Recent Value   Initial Alcohol Screen: US AUDIT-C     1  How often do you have a drink containing alcohol? 0 Filed at: 05/04/2023 1226   Audit-C Score 0 Filed at: 05/04/2023 1226   ROD: How many times in the past year have you        Used an illegal drug or used a prescription medication for non-medical reasons? Never Filed at: 05/04/2023 1226                    Medical Decision Making  Pulse ox 100% on room air indicating adequate oxygenation  Differential diagnoses include but not limited to postop infection, small bowel obstruction, perforated viscus      Reviewed CT and laboratory findings with patient who also reviewed the findings on MyChart and spoken to his surgeon at [facility]  We will continue to follow-up as scheduled  Amount and/or Complexity of Data Reviewed  Labs: ordered  Radiology: ordered  Risk  Prescription drug management  Disposition  Final diagnoses:   Abdominal pain     Time reflects when diagnosis was documented in both MDM as applicable and the Disposition within this note     Time User Action Codes Description Comment    5/4/2023  5:52 PM Tahira Linares Add [R10 9] Abdominal pain       ED Disposition     ED Disposition   Discharge    Condition   Stable    Date/Time   Thu May 4, 2023  6:08 PM    Comment   Gerardo Angeles discharge to home/self care                 Follow-up Information     Follow up With Specialties Details Why Contact Info Additional Information    Surgery as scheduled         [address] [place] [address] Emergency Department Emergency Medicine  If symptoms worsen [address] [address] Emergency Department, [place], [place], [facility]          Discharge Medication List as of 5/4/2023  6:08 PM      CONTINUE these medications which have NOT CHANGED    Details   acetaminophen (TYLENOL) 325 mg tablet Take 2 tablets (650 mg total) by mouth every 6 (six) hours as needed for mild pain, headaches or fever, Starting Tue 1/24/2023, No Print      ALPRAZolam (XANAX) 0 5 mg tablet 1-2 po q8hrs prn anxiety, Normal      DULoxetine (CYMBALTA) 60 mg delayed release capsule Take 1 capsule (60 mg total) by mouth daily, Starting Tue 2/7/2023, Normal      loperamide (IMODIUM) 2 mg capsule Take 1 capsule (2 mg total) by mouth 3 (three) times a day before meals Do not start before March 16, 2023 , Starting Thu 3/16/2023, Normal      ondansetron (ZOFRAN-ODT) 4 mg disintegrating tablet Take 1 tablet (4 mg total) by mouth every 6 (six) hours as needed for nausea for up to 3 days, Starting Wed 1/18/2023, Until Thu 2/16/2023 at 2359, Normal      zolpidem (AMBIEN CR) 12 5 MG CR tablet Take 1 tablet (12 5 mg total) by mouth daily at bedtime as needed for sleep Do not use with any opioids, Starting Wed 3/29/2023, Normal             No discharge procedures on file      PDMP Review       Value Time User    PDMP Reviewed  Yes 3/29/2023 11:00 PM Joanna Nielsen DO          ED Provider  Electronically Signed by           Jaguar Thompson DO  05/05/23 1283

## 2023-05-07 LAB
BACTERIA BLD CULT: NORMAL
BACTERIA BLD CULT: NORMAL

## 2023-05-08 ENCOUNTER — APPOINTMENT (EMERGENCY)
Dept: RADIOLOGY | Facility: HOSPITAL | Age: 28
End: 2023-05-08

## 2023-05-08 ENCOUNTER — HOSPITAL ENCOUNTER (EMERGENCY)
Facility: HOSPITAL | Age: 28
Discharge: HOME | End: 2023-05-08
Attending: EMERGENCY MEDICINE

## 2023-05-08 VITALS
HEART RATE: 93 BPM | DIASTOLIC BLOOD PRESSURE: 72 MMHG | TEMPERATURE: 98 F | SYSTOLIC BLOOD PRESSURE: 137 MMHG | RESPIRATION RATE: 18 BRPM | OXYGEN SATURATION: 98 %

## 2023-05-08 DIAGNOSIS — R10.30 LOWER ABDOMINAL PAIN: Primary | ICD-10-CM

## 2023-05-08 LAB
ALBUMIN SERPL BCP-MCNC: 4.2 G/DL (ref 3.5–5)
ALP SERPL-CCNC: 131 U/L (ref 34–104)
ALT SERPL W P-5'-P-CCNC: 14 U/L (ref 7–52)
ANION GAP SERPL CALCULATED.3IONS-SCNC: 9 MMOL/L (ref 4–13)
AST SERPL W P-5'-P-CCNC: 14 U/L (ref 13–39)
BASOPHILS # BLD AUTO: 0.12 THOUSANDS/ÂΜL (ref 0–0.1)
BASOPHILS NFR BLD AUTO: 1 % (ref 0–1)
BILIRUB SERPL-MCNC: 0.61 MG/DL (ref 0.2–1)
BUN SERPL-MCNC: 8 MG/DL (ref 5–25)
CALCIUM SERPL-MCNC: 9.6 MG/DL (ref 8.4–10.2)
CHLORIDE SERPL-SCNC: 101 MMOL/L (ref 96–108)
CO2 SERPL-SCNC: 25 MMOL/L (ref 21–32)
CREAT SERPL-MCNC: 0.84 MG/DL (ref 0.6–1.3)
EOSINOPHIL # BLD AUTO: 0.2 THOUSAND/ÂΜL (ref 0–0.61)
EOSINOPHIL NFR BLD AUTO: 2 % (ref 0–6)
ERYTHROCYTE [DISTWIDTH] IN BLOOD BY AUTOMATED COUNT: 18.2 % (ref 11.6–15.1)
GFR SERPL CREATININE-BSD FRML MDRD: 117 ML/MIN/1.73SQ M
GLUCOSE SERPL-MCNC: 98 MG/DL (ref 65–140)
HCT VFR BLD AUTO: 36.8 % (ref 36.5–49.3)
HGB BLD-MCNC: 11 G/DL (ref 12–17)
IMM GRANULOCYTES # BLD AUTO: 0.02 THOUSAND/UL (ref 0–0.2)
IMM GRANULOCYTES NFR BLD AUTO: 0 % (ref 0–2)
LIPASE SERPL-CCNC: 32 U/L (ref 11–82)
LYMPHOCYTES # BLD AUTO: 1.48 THOUSANDS/ÂΜL (ref 0.6–4.47)
LYMPHOCYTES NFR BLD AUTO: 14 % (ref 14–44)
MCH RBC QN AUTO: 18.8 PG (ref 26.8–34.3)
MCHC RBC AUTO-ENTMCNC: 29.9 G/DL (ref 31.4–37.4)
MCV RBC AUTO: 63 FL (ref 82–98)
MONOCYTES # BLD AUTO: 0.75 THOUSAND/ÂΜL (ref 0.17–1.22)
MONOCYTES NFR BLD AUTO: 7 % (ref 4–12)
NEUTROPHILS # BLD AUTO: 7.91 THOUSANDS/ÂΜL (ref 1.85–7.62)
NEUTS SEG NFR BLD AUTO: 76 % (ref 43–75)
NRBC BLD AUTO-RTO: 0 /100 WBCS
PLATELET # BLD AUTO: 626 THOUSANDS/UL (ref 149–390)
PMV BLD AUTO: 8.3 FL (ref 8.9–12.7)
POTASSIUM SERPL-SCNC: 3.6 MMOL/L (ref 3.5–5.3)
PROT SERPL-MCNC: 7.8 G/DL (ref 6.4–8.4)
RBC # BLD AUTO: 5.85 MILLION/UL (ref 3.88–5.62)
SODIUM SERPL-SCNC: 135 MMOL/L (ref 135–147)
WBC # BLD AUTO: 10.48 THOUSAND/UL (ref 4.31–10.16)

## 2023-05-08 RX ORDER — HYDROMORPHONE HCL/PF 1 MG/ML
1 SYRINGE (ML) INJECTION ONCE
Status: COMPLETED | OUTPATIENT
Start: 2023-05-08 | End: 2023-05-08

## 2023-05-08 RX ORDER — HYDROMORPHONE HCL/PF 1 MG/ML
0.5 SYRINGE (ML) INJECTION ONCE
Status: COMPLETED | OUTPATIENT
Start: 2023-05-08 | End: 2023-05-08

## 2023-05-08 RX ORDER — ONDANSETRON 2 MG/ML
4 INJECTION INTRAMUSCULAR; INTRAVENOUS ONCE
Status: COMPLETED | OUTPATIENT
Start: 2023-05-08 | End: 2023-05-08

## 2023-05-08 RX ORDER — MORPHINE SULFATE 4 MG/ML
4 INJECTION, SOLUTION INTRAMUSCULAR; INTRAVENOUS ONCE
Status: COMPLETED | OUTPATIENT
Start: 2023-05-08 | End: 2023-05-08

## 2023-05-08 RX ADMIN — SODIUM CHLORIDE 1000 ML: 0.9 INJECTION, SOLUTION INTRAVENOUS at 01:57

## 2023-05-08 RX ADMIN — HYDROMORPHONE HYDROCHLORIDE 1 MG: 1 INJECTION, SOLUTION INTRAMUSCULAR; INTRAVENOUS; SUBCUTANEOUS at 01:57

## 2023-05-08 RX ADMIN — IOHEXOL 50 ML: 240 INJECTION, SOLUTION INTRATHECAL; INTRAVASCULAR; INTRAVENOUS; ORAL at 04:59

## 2023-05-08 RX ADMIN — IOHEXOL 100 ML: 350 INJECTION, SOLUTION INTRAVENOUS at 04:59

## 2023-05-08 RX ADMIN — ONDANSETRON 4 MG: 2 INJECTION INTRAMUSCULAR; INTRAVENOUS at 00:57

## 2023-05-08 RX ADMIN — HYDROMORPHONE HYDROCHLORIDE 0.5 MG: 1 INJECTION, SOLUTION INTRAMUSCULAR; INTRAVENOUS; SUBCUTANEOUS at 05:09

## 2023-05-08 RX ADMIN — MORPHINE SULFATE 4 MG: 4 INJECTION INTRAVENOUS at 00:57

## 2023-05-08 NOTE — ED PROVIDER NOTES
History  Chief Complaint   Patient presents with   • Abdominal Pain     Pt states (had major abdominal surgery 23, nausea but denies any vomiting  Now experiencing pain no BM since 5pm he thinks possible bowel obstruction  )       History provided by:  Patient   used: No    Abdominal Pain  Pain location:  Generalized  Pain quality: sharp    Pain radiates to:  Does not radiate  Pain severity:  Severe  Onset quality:  Sudden  Duration:  6 hours  Timing:  Constant  Progression:  Worsening  Chronicity:  Recurrent  Context: previous surgery    Relieved by:  Nothing  Worsened by:  Nothing  Ineffective treatments:  None tried  Associated symptoms: nausea    Associated symptoms: no chest pain, no cough, no fever, no hematemesis, no hematochezia, no hematuria, no shortness of breath and no vomiting    Risk factors: multiple surgeries        Prior to Admission Medications   Prescriptions Last Dose Informant Patient Reported? Taking? ALPRAZolam (XANAX) 0 5 mg tablet   No No   Si-2 po q8hrs prn anxiety   Patient not taking: Reported on 3/31/2023   DULoxetine (CYMBALTA) 60 mg delayed release capsule   No No   Sig: Take 1 capsule (60 mg total) by mouth daily   acetaminophen (TYLENOL) 325 mg tablet   No No   Sig: Take 2 tablets (650 mg total) by mouth every 6 (six) hours as needed for mild pain, headaches or fever   loperamide (IMODIUM) 2 mg capsule   No No   Sig: Take 1 capsule (2 mg total) by mouth 3 (three) times a day before meals Do not start before 2023     ondansetron (ZOFRAN-ODT) 4 mg disintegrating tablet   No No   Sig: Take 1 tablet (4 mg total) by mouth every 6 (six) hours as needed for nausea for up to 3 days   Patient not taking: Reported on 3/31/2023   zolpidem (AMBIEN CR) 12 5 MG CR tablet   No No   Sig: Take 1 tablet (12 5 mg total) by mouth daily at bedtime as needed for sleep Do not use with any opioids      Facility-Administered Medications: None       Past Medical History:   Diagnosis Date   • Ankylosing spondylitis (HCC)    • Anxiety    • Bowel obstruction (HCC)    • Clostridium difficile colitis 9/13/2018   • Colitis    • 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[facility] ) 9/13/2018   • Pancreatitis    • [facility] ([facility] )        Past Surgical History:   Procedure Laterality Date   • COLECTOMY TOTAL      with ileal pouch and anastemosis   • IR PICC PLACEMENT SINGLE LUMEN  3/1/2022   • TOTAL COLECTOMY         Family History   Problem Relation Age of Onset   • Lung disease Neg Hx      I have reviewed and agree with the history as documented  E-Cigarette/Vaping   • E-Cigarette Use Never User      E-Cigarette/Vaping Substances   • Nicotine No    • THC No    • CBD No    • Flavoring No    • Other No    • Unknown No      Social History     Tobacco Use   • Smoking status: Never   • Smokeless tobacco: Never   Vaping Use   • Vaping Use: Never used   Substance Use Topics   • Alcohol use: Not Currently     Comment: pt states 5 a month/socially   • Drug use: No       Review of Systems   Constitutional: Negative for fever  Respiratory: Negative for cough and shortness of breath  Cardiovascular: Negative for chest pain  Gastrointestinal: Positive for abdominal pain and nausea  Negative for hematemesis, hematochezia and vomiting  Genitourinary: Negative for hematuria  Musculoskeletal: Negative for back pain  Neurological: Negative for light-headedness  All other systems reviewed and are negative  Physical Exam  Physical Exam  Vitals and nursing note reviewed  Constitutional:       General: He is not in acute distress  Appearance: He is well-developed  HENT:      Head: Normocephalic and atraumatic  Eyes:      General: No scleral icterus  Conjunctiva/sclera: Conjunctivae normal    Cardiovascular:      Rate and Rhythm: Normal rate and regular rhythm  Heart sounds: Normal heart sounds  No murmur heard    Pulmonary:      Effort: Pulmonary effort is normal  No respiratory distress  Breath sounds: Normal breath sounds  Abdominal:      General: Bowel sounds are increased  There is no distension  Palpations: Abdomen is soft  There is no mass  Tenderness: There is generalized abdominal tenderness  There is no guarding or rebound  Hernia: No hernia is present  Musculoskeletal:         General: No swelling  Cervical back: Neck supple  Skin:     General: Skin is warm and dry  Capillary Refill: Capillary refill takes less than 2 seconds  Neurological:      General: No focal deficit present  Mental Status: He is alert  Motor: No weakness     Psychiatric:         Mood and Affect: Mood normal          Vital Signs  ED Triage Vitals [05/08/23 0026]   Temperature Pulse Respirations Blood Pressure SpO2   98 °F (36 7 °C) 93 18 137/72 98 %      Temp Source Heart Rate Source Patient Position - Orthostatic VS BP Location FiO2 (%)   Oral -- Lying Right arm --      Pain Score       9           Vitals:    05/08/23 0026   BP: 137/72   Pulse: 93   Patient Position - Orthostatic VS: Lying         Visual Acuity      ED Medications  Medications   sodium chloride 0 9 % bolus 1,000 mL (has no administration in time range)   HYDROmorphone (DILAUDID) injection 1 mg (has no administration in time range)   ondansetron (ZOFRAN) injection 4 mg (4 mg Intravenous Given 5/8/23 0057)   morphine injection 4 mg (4 mg Intravenous Given 5/8/23 0057)       Diagnostic Studies  Results Reviewed     Procedure Component Value Units Date/Time    CBC and differential [[id number]]  (Abnormal) Collected: 05/08/23 0051    Lab Status: Final result Specimen: Blood from Arm, Right Updated: 05/08/23 0144     WBC 10 48 Thousand/uL      RBC 5 85 Million/uL      Hemoglobin 11 0 g/dL      Hematocrit 36 8 %      MCV 63 fL      MCH 18 8 pg      MCHC 29 9 g/dL      RDW 18 2 %      MPV 8 3 fL      Platelets 770 Thousands/uL      nRBC 0 /100 WBCs      Neutrophils Relative 76 %      Immat GRANS % 0 % Lymphocytes Relative 14 %      Monocytes Relative 7 %      Eosinophils Relative 2 %      Basophils Relative 1 %      Neutrophils Absolute 7 91 Thousands/µL      Immature Grans Absolute 0 02 Thousand/uL      Lymphocytes Absolute 1 48 Thousands/µL      Monocytes Absolute 0 75 Thousand/µL      Eosinophils Absolute 0 20 Thousand/µL      Basophils Absolute 0 12 Thousands/µL     CMP [[id number]]  (Abnormal) Collected: 05/08/23 0051    Lab Status: Final result Specimen: Blood from Arm, Right Updated: 05/08/23 0131     Sodium 135 mmol/L      Potassium 3 6 mmol/L      Chloride 101 mmol/L      CO2 25 mmol/L      ANION GAP 9 mmol/L      BUN 8 mg/dL      Creatinine 0 84 mg/dL      Glucose 98 mg/dL      Calcium 9 6 mg/dL      AST 14 U/L      ALT 14 U/L      Alkaline Phosphatase 131 U/L      Total Protein 7 8 g/dL      Albumin 4 2 g/dL      Total Bilirubin 0 61 mg/dL      eGFR 117 ml/min/1 73sq m     Narrative:      [place] guidelines for Chronic Kidney Disease (CKD):   •  Stage 1 with normal or high GFR (GFR > 90 mL/min/1 73 square meters)  •  Stage 2 Mild CKD (GFR = 60-89 mL/min/1 73 square meters)  •  Stage 3A Moderate CKD (GFR = 45-59 mL/min/1 73 square meters)  •  Stage 3B Moderate CKD (GFR = 30-44 mL/min/1 73 square meters)  •  Stage 4 Severe CKD (GFR = 15-29 mL/min/1 73 square meters)  •  Stage 5 End Stage CKD (GFR <15 mL/min/1 73 square meters)  Note: GFR calculation is accurate only with a steady state creatinine    Lipase [[id number]]  (Normal) Collected: 05/08/23 0051    Lab Status: Final result Specimen: Blood from Arm, Right Updated: 05/08/23 0131     Lipase 32 u/L                  X-ray abdomen obstruction series    (Results Pending)   CT abdomen pelvis with contrast    (Results Pending)              Procedures  Procedures         ED Course                               SBIRT 22yo+    Flowsheet Row Most Recent Value   Initial Alcohol Screen: US AUDIT-C     1   How often do you have a drink containing alcohol? 0 Filed at: 05/08/2023 0029   2  How many drinks containing alcohol do you have on a typical day you are drinking? 0 Filed at: 05/08/2023 0029   3a  Male UNDER 65: How often do you have five or more drinks on one occasion? 0 Filed at: 05/08/2023 0029   3b  FEMALE Any Age, or MALE 65+: How often do you have 4 or more drinks on one occassion? 0 Filed at: 05/08/2023 0029   Audit-C Score 0 Filed at: 05/08/2023 1183   ROD: How many times in the past year have you    Used an illegal drug or used a prescription medication for non-medical reasons? Never Filed at: 05/08/2023 4114                    Medical Decision Making  24-year-old male with a history of chronic abdominal pain status post multiple abdominal surgeries and ileostomy presents with abdominal pain  Differential diagnosis includes but is not limited to small bowel obstruction, large bowel obstruction, colitis, diverticular disease, intra-abdominal abscess, intra-abdominal hemorrhage  Labs and imaging reviewed  There is no acute lab abnormalities and his CT is negative for any obstructive or acute process  Patient is hemodynamically stable and nontoxic-appearing  Serial abdominal examinations are benign  There is contrast located in his ileostomy bag as well  Patient is tolerating p o  at this time  Plan is to discharge him to home with appropriate follow-up and strict return precautions  He states that he understands plan as discussed and all questions were answered prior to discharge  Amount and/or Complexity of Data Reviewed  Labs: ordered  Radiology: ordered  Risk  Prescription drug management  Disposition  Final diagnoses:   None     ED Disposition     None      Follow-up Information    None         Patient's Medications   Discharge Prescriptions    No medications on file       No discharge procedures on file      PDMP Review       Value Time User    PDMP Reviewed  Yes 3/29/2023 11:00 PM Thea Jha DO          ED Provider  Electronically Signed by           Terra Fuchs MD  05/08/23 7227

## 2023-05-09 DIAGNOSIS — G47.09 OTHER INSOMNIA: ICD-10-CM

## 2023-05-09 DIAGNOSIS — F41.8 SITUATIONAL ANXIETY: ICD-10-CM

## 2023-05-09 LAB
BACTERIA BLD CULT: NORMAL
BACTERIA BLD CULT: NORMAL

## 2023-05-09 RX ORDER — ZOLPIDEM TARTRATE 12.5 MG/1
12.5 TABLET, FILM COATED, EXTENDED RELEASE ORAL
Qty: 30 TABLET | Refills: 0 | Status: SHIPPED | OUTPATIENT
Start: 2023-05-09 | End: 2023-05-18

## 2023-05-09 RX ORDER — ALPRAZOLAM 0.5 MG/1
TABLET ORAL
Qty: 90 TABLET | Refills: 0 | Status: SHIPPED | OUTPATIENT
Start: 2023-05-09

## 2023-05-10 DIAGNOSIS — F41.8 SITUATIONAL ANXIETY: ICD-10-CM

## 2023-05-10 RX ORDER — ALPRAZOLAM 0.5 MG/1
TABLET ORAL
Qty: 90 TABLET | Refills: 0 | OUTPATIENT
Start: 2023-05-10

## 2023-05-10 NOTE — TELEPHONE ENCOUNTER
I called the pharmacy, they stated that they did not receive the prescription until around 8pm last night  They do have it ready for him to   Left message on machine for patient to call office back to inform him    Geraldo Brooke, VIKTOR

## 2023-05-10 NOTE — TELEPHONE ENCOUNTER
Sw pt  Surgeon not getting back to him since messaging him yesterday at 11am  Lower abdominal pain approaching 48hrs  Reluctant to go to ER but knows he may need to go

## 2023-05-10 NOTE — TELEPHONE ENCOUNTER
Dr Olivares Estefanía please call pt back this evening before you leave, he is having a lot of abdominal pain and no one is getting back to him I think he just needs to know what he should be doing, please call to reassure him

## 2023-05-11 ENCOUNTER — PATIENT OUTREACH (OUTPATIENT)
Dept: FAMILY MEDICINE CLINIC | Facility: CLINIC | Age: 28
End: 2023-05-11

## 2023-05-11 NOTE — PROGRESS NOTES
Rising [employer]izer referral for [facility]    Chart was reviewed and patient has previously declined services and further outreach  Patient had recent abdominal surgery  J Pouch Redo and lysis of adhesions and has been complaining of 10/10 pain  Per chart patient was ordered Dilaudid 6 mg po every 6 hrs by his surgeon but patient was taking it more frequently due to pain and subsequently run out of pills  He has been trying to get a new prescription from his doctors  Patient had his surgery done on 4/30/23 at [facility]  He was an IP there from 4/30/23 through 5/1/23  Patient has presented to the ER twice since HI 5/4 and 5/8 with the same complaints, nausea and abdominal pain  Patient was treated with IV Dilaudid, Zofran and IVF's at both ER visits  He had a CT scan which was (-) for a bowel obstruction  This RNCM called patient and introduced self and he states he remembers speaking to me in the past  He is more open to follow up due to his health issues and ongoing pain at this time  AVS and Medications were reviewed  Patient states he stopped taking his Duloxetine on his own in March because he did not feel it was helping him  He states he does not go to a Psychiatrist or to Therapy  His PCP prescribed his Duloxetine and Xanax  He states he did not tell his doctor Patient states he was prescribed Dilaudid 6 mg every 6 hours for his abdominal pain but it was not helping so he took it more frequently and now he ran out and his doctors will not give him another refill  He states he left a message with his surgeons office and has not heard back yet  He says the last time he spoke with them regarding pain medications they told him he needs to find a pain management doctor  He states he is very frustrated no one can help  He states that he has tried to find a pain specialist but no one will take him because he has bowel issues which are causing the pain and they tell him it can mask more serious issues   I asked him which doctors offices he called and he states he does not remember  I told him I could look into into it and try to assist  He was very appreciative of this and said he would like the help very much  I told him I couldn't guarantee I would be able to find one but I will do my best     Patient states he is independent of all ADL's and ambulates without assistance  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 but he has been out of work since April because of his medical issues  He states he is still driving and takes himself to his appointments  If he is unable to drive because of some reason he has transportation as well  It is not an issue  He states he has medication coverage and has no issues affording or obtaining medication  Patient has an apt with his PCP on Monday 5/15  He states he is hoping Dr Abigail Solis will be able to prescribe pain medication if he does not hear back from his surgeon  Patient states the only goal he has medically is to manage his abdominal pain  He declines any other needs but would appreciate follow up and help finding a pain management doctor  All of patients Medications were reviewed and Adult Assessment  RNCM to follow

## 2023-05-12 ENCOUNTER — PATIENT OUTREACH (OUTPATIENT)
Dept: FAMILY MEDICINE CLINIC | Facility: CLINIC | Age: 28
End: 2023-05-12

## 2023-05-12 NOTE — PROGRESS NOTES
Chart was reviewed and this RNCM called [facility] and spoke with Jin Mandujano  We discussed patients situation with ongoing abdominal pain with multiple surgeries  She reviewed their notes and she states he did call their office  She states they do not treat abdominal pain  Most pain doctors do not  She recommended he speak with his GI doctor  This RNCM then called patient to discuss the above  He states he was at [facility] for wound check and they sent him to their ER for his intractable pain and are doing an emergent CT scan  We discussed the recommendation for him to make an apt with his GI doctor and discuss pain mgmt  He states understanding and he will make an apt with Dr Tex Alvarez office  He states his surgeons will not continue with giving him pain medications  Patient is agreeable to a follow up call at [facility]

## 2023-05-14 PROBLEM — F41.8 SITUATIONAL ANXIETY: Status: ACTIVE | Noted: 2023-05-14

## 2023-05-18 ENCOUNTER — HOSPITAL ENCOUNTER (EMERGENCY)
Facility: HOSPITAL | Age: 28
Discharge: HOME | End: 2023-05-18
Attending: EMERGENCY MEDICINE | Admitting: EMERGENCY MEDICINE

## 2023-05-18 ENCOUNTER — APPOINTMENT (EMERGENCY)
Dept: RADIOLOGY | Facility: HOSPITAL | Age: 28
End: 2023-05-18

## 2023-05-18 ENCOUNTER — TELEPHONE (OUTPATIENT)
Dept: FAMILY MEDICINE CLINIC | Facility: CLINIC | Age: 28
End: 2023-05-18

## 2023-05-18 ENCOUNTER — APPOINTMENT (OUTPATIENT)
Dept: RADIOLOGY | Facility: HOSPITAL | Age: 28
End: 2023-05-18
Attending: FAMILY MEDICINE

## 2023-05-18 ENCOUNTER — HOSPITAL ENCOUNTER (OUTPATIENT)
Dept: RADIOLOGY | Facility: HOSPITAL | Age: 28
Discharge: HOME | End: 2023-05-18
Attending: FAMILY MEDICINE

## 2023-05-18 ENCOUNTER — OFFICE VISIT (OUTPATIENT)
Dept: FAMILY MEDICINE CLINIC | Facility: CLINIC | Age: 28
End: 2023-05-18

## 2023-05-18 VITALS
HEART RATE: 79 BPM | TEMPERATURE: 98.8 F | DIASTOLIC BLOOD PRESSURE: 76 MMHG | OXYGEN SATURATION: 100 % | SYSTOLIC BLOOD PRESSURE: 125 MMHG | RESPIRATION RATE: 18 BRPM

## 2023-05-18 VITALS
OXYGEN SATURATION: 99 % | RESPIRATION RATE: 18 BRPM | BODY MASS INDEX: 25.92 KG/M2 | HEIGHT: 69 IN | WEIGHT: 175 LBS | HEART RATE: 72 BPM | SYSTOLIC BLOOD PRESSURE: 118 MMHG | DIASTOLIC BLOOD PRESSURE: 80 MMHG | TEMPERATURE: 98.3 F

## 2023-05-18 DIAGNOSIS — G47.09 OTHER INSOMNIA: ICD-10-CM

## 2023-05-18 DIAGNOSIS — R10.32 LEFT GROIN PAIN: Primary | ICD-10-CM

## 2023-05-18 DIAGNOSIS — N50.812 LEFT TESTICULAR PAIN: ICD-10-CM

## 2023-05-18 DIAGNOSIS — R10.13 EPIGASTRIC PAIN: Primary | ICD-10-CM

## 2023-05-18 DIAGNOSIS — Z87.19 HISTORY OF ULCERATIVE COLITIS: ICD-10-CM

## 2023-05-18 DIAGNOSIS — M45.0 ANKYLOSING SPONDYLITIS OF MULTIPLE SITES IN SPINE (HCC): ICD-10-CM

## 2023-05-18 DIAGNOSIS — F41.8 SITUATIONAL ANXIETY: ICD-10-CM

## 2023-05-18 LAB
ALBUMIN SERPL BCP-MCNC: 4.2 G/DL (ref 3.5–5)
ALP SERPL-CCNC: 99 U/L (ref 34–104)
ALT SERPL W P-5'-P-CCNC: 32 U/L (ref 7–52)
ANION GAP SERPL CALCULATED.3IONS-SCNC: 8 MMOL/L (ref 4–13)
AST SERPL W P-5'-P-CCNC: 31 U/L (ref 13–39)
BASOPHILS # BLD AUTO: 0.13 THOUSANDS/ÂΜL (ref 0–0.1)
BASOPHILS NFR BLD AUTO: 1 % (ref 0–1)
BILIRUB SERPL-MCNC: 0.48 MG/DL (ref 0.2–1)
BUN SERPL-MCNC: 8 MG/DL (ref 5–25)
CALCIUM SERPL-MCNC: 9.5 MG/DL (ref 8.4–10.2)
CHLORIDE SERPL-SCNC: 103 MMOL/L (ref 96–108)
CO2 SERPL-SCNC: 26 MMOL/L (ref 21–32)
CREAT SERPL-MCNC: 0.85 MG/DL (ref 0.6–1.3)
EOSINOPHIL # BLD AUTO: 0.42 THOUSAND/ÂΜL (ref 0–0.61)
EOSINOPHIL NFR BLD AUTO: 4 % (ref 0–6)
ERYTHROCYTE [DISTWIDTH] IN BLOOD BY AUTOMATED COUNT: 18.6 % (ref 11.6–15.1)
GFR SERPL CREATININE-BSD FRML MDRD: 116 ML/MIN/1.73SQ M
GLUCOSE SERPL-MCNC: 163 MG/DL (ref 65–140)
HCT VFR BLD AUTO: 36 % (ref 36.5–49.3)
HGB BLD-MCNC: 11 G/DL (ref 12–17)
IMM GRANULOCYTES # BLD AUTO: 0.04 THOUSAND/UL (ref 0–0.2)
IMM GRANULOCYTES NFR BLD AUTO: 0 % (ref 0–2)
LIPASE SERPL-CCNC: 20 U/L (ref 11–82)
LYMPHOCYTES # BLD AUTO: 1.83 THOUSANDS/ÂΜL (ref 0.6–4.47)
LYMPHOCYTES NFR BLD AUTO: 16 % (ref 14–44)
MCH RBC QN AUTO: 19.2 PG (ref 26.8–34.3)
MCHC RBC AUTO-ENTMCNC: 30.6 G/DL (ref 31.4–37.4)
MCV RBC AUTO: 63 FL (ref 82–98)
MONOCYTES # BLD AUTO: 0.61 THOUSAND/ÂΜL (ref 0.17–1.22)
MONOCYTES NFR BLD AUTO: 5 % (ref 4–12)
NEUTROPHILS # BLD AUTO: 8.71 THOUSANDS/ÂΜL (ref 1.85–7.62)
NEUTS SEG NFR BLD AUTO: 74 % (ref 43–75)
NRBC BLD AUTO-RTO: 0 /100 WBCS
PLATELET # BLD AUTO: 464 THOUSANDS/UL (ref 149–390)
PMV BLD AUTO: 8.9 FL (ref 8.9–12.7)
POTASSIUM SERPL-SCNC: 4.1 MMOL/L (ref 3.5–5.3)
PROT SERPL-MCNC: 7.4 G/DL (ref 6.4–8.4)
RBC # BLD AUTO: 5.74 MILLION/UL (ref 3.88–5.62)
SODIUM SERPL-SCNC: 137 MMOL/L (ref 135–147)
WBC # BLD AUTO: 11.74 THOUSAND/UL (ref 4.31–10.16)

## 2023-05-18 RX ORDER — HYDROMORPHONE HCL/PF 1 MG/ML
0.5 SYRINGE (ML) INJECTION ONCE
Status: COMPLETED | OUTPATIENT
Start: 2023-05-18 | End: 2023-05-18

## 2023-05-18 RX ORDER — TEMAZEPAM 7.5 MG/1
7.5 CAPSULE ORAL
Qty: 30 CAPSULE | Refills: 0 | Status: SHIPPED | OUTPATIENT
Start: 2023-05-18

## 2023-05-18 RX ORDER — TEMAZEPAM 7.5 MG/1
7.5 CAPSULE ORAL
Qty: 90 CAPSULE | Refills: 0 | Status: SHIPPED | OUTPATIENT
Start: 2023-05-18 | End: 2023-05-18 | Stop reason: SDUPTHER

## 2023-05-18 RX ORDER — HYDROMORPHONE HCL/PF 1 MG/ML
1 SYRINGE (ML) INJECTION ONCE
Status: COMPLETED | OUTPATIENT
Start: 2023-05-18 | End: 2023-05-18

## 2023-05-18 RX ORDER — MAGNESIUM HYDROXIDE/ALUMINUM HYDROXICE/SIMETHICONE 120; 1200; 1200 MG/30ML; MG/30ML; MG/30ML
30 SUSPENSION ORAL ONCE
Status: COMPLETED | OUTPATIENT
Start: 2023-05-18 | End: 2023-05-18

## 2023-05-18 RX ORDER — ONDANSETRON 2 MG/ML
4 INJECTION INTRAMUSCULAR; INTRAVENOUS ONCE
Status: COMPLETED | OUTPATIENT
Start: 2023-05-18 | End: 2023-05-18

## 2023-05-18 RX ADMIN — HYDROMORPHONE HYDROCHLORIDE 1 MG: 1 INJECTION, SOLUTION INTRAMUSCULAR; INTRAVENOUS; SUBCUTANEOUS at 17:49

## 2023-05-18 RX ADMIN — SODIUM CHLORIDE 1000 ML: 0.9 INJECTION, SOLUTION INTRAVENOUS at 16:00

## 2023-05-18 RX ADMIN — ALUMINUM HYDROXIDE, MAGNESIUM HYDROXIDE, AND DIMETHICONE 30 ML: 200; 20; 200 SUSPENSION ORAL at 19:43

## 2023-05-18 RX ADMIN — ONDANSETRON 4 MG: 2 INJECTION INTRAMUSCULAR; INTRAVENOUS at 17:47

## 2023-05-18 RX ADMIN — IOHEXOL 50 ML: 240 INJECTION, SOLUTION INTRATHECAL; INTRAVASCULAR; INTRAVENOUS; ORAL at 16:08

## 2023-05-18 RX ADMIN — HYDROMORPHONE HYDROCHLORIDE 1 MG: 1 INJECTION, SOLUTION INTRAMUSCULAR; INTRAVENOUS; SUBCUTANEOUS at 15:34

## 2023-05-18 RX ADMIN — HYDROMORPHONE HYDROCHLORIDE 0.5 MG: 1 INJECTION, SOLUTION INTRAMUSCULAR; INTRAVENOUS; SUBCUTANEOUS at 19:43

## 2023-05-18 RX ADMIN — ONDANSETRON 4 MG: 2 INJECTION INTRAMUSCULAR; INTRAVENOUS at 15:35

## 2023-05-18 NOTE — TELEPHONE ENCOUNTER
Pharmacy wont give patient this medication    They stated that the Medication needs to reduce quantity to 30 days or start Prior Auth  temazepam (RESTORIL) 7 5 mg capsule       [facility]    Please call patient as he needs this medication Today

## 2023-05-18 NOTE — ED PROVIDER NOTES
History  Chief Complaint   Patient presents with   • Abdominal Pain     Pt reports sharp constant upper abdominal pain that comes with nausea since this morning, rates pain 8/10       23-year-old male presents the ED for recurrent abdominal discomfort  Patient has had numerous surgeries on his colon and recently had more surgery has a colostomy bag  He states he has been eating what he supposed to and had just some white rice with water earlier today and he feels that it stops and is just stomach is distended          Prior to Admission Medications   Prescriptions Last Dose Informant Patient Reported? Taking? ALPRAZolam (XANAX) 0 5 mg tablet   No No   Si-2 po q8hrs prn anxiety, avoid use with opioids   HYDROmorphone (DILAUDID) 2 mg tablet   Yes No   Patient not taking: Reported on 2023   acetaminophen (TYLENOL) 325 mg tablet   No No   Sig: Take 2 tablets (650 mg total) by mouth every 6 (six) hours as needed for mild pain, headaches or fever   diphenoxylate-atropine (LOMOTIL) 2 5-0 025 mg per tablet   Yes No   Sig: Take 2 tablets by mouth   Patient not taking: Reported on 2023   loperamide (IMODIUM) 2 mg capsule   No No   Sig: Take 1 capsule (2 mg total) by mouth 3 (three) times a day before meals Do not start before 2023  temazepam (RESTORIL) 7 5 mg capsule   No No   Sig: Take 1 capsule (7 5 mg total) by mouth daily at bedtime as needed for sleep Avoid use with xanax        Facility-Administered Medications: None       Past Medical History:   Diagnosis Date   • Ankylosing spondylitis ([facility] )    • Anxiety    • Bowel obstruction (HCC)    • Clostridium difficile colitis 2018   • Colitis    • 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[address] ) 2018   • Pancreatitis    • Ulcerative colitis ([facility] )        Past Surgical History:   Procedure Laterality Date   • COLECTOMY TOTAL      with ileal pouch and anastemosis   • IR PICC PLACEMENT SINGLE LUMEN  3/1/2022   • TOTAL COLECTOMY         Family History   Problem Relation Age of Onset   • Lung disease Neg Hx      I have reviewed and agree with the history as documented  E-Cigarette/Vaping   • E-Cigarette Use Never User      E-Cigarette/Vaping Substances   • Nicotine No    • THC No    • CBD No    • Flavoring No    • Other No    • Unknown No      Social History     Tobacco Use   • Smoking status: Never   • Smokeless tobacco: Never   Vaping Use   • Vaping Use: Never used   Substance Use Topics   • Alcohol use: Not Currently     Comment: pt states 5 a month/socially   • Drug use: No       Review of Systems   Constitutional: Negative for activity change, chills, diaphoresis and fever  HENT: Negative for congestion, ear pain, nosebleeds, sore throat, trouble swallowing and voice change  Eyes: Negative for pain, discharge and redness  Respiratory: Negative for apnea, cough, choking, shortness of breath, wheezing and stridor  Cardiovascular: Negative for chest pain and palpitations  Gastrointestinal: Positive for abdominal pain and nausea  Negative for abdominal distention, constipation, diarrhea and vomiting  Endocrine: Negative for polydipsia  Genitourinary: Negative for difficulty urinating, dysuria, flank pain, frequency, hematuria and urgency  Musculoskeletal: Negative for back pain, gait problem, joint swelling, myalgias, neck pain and neck stiffness  Skin: Negative for pallor and rash  Neurological: Negative for dizziness, tremors, syncope, speech difficulty, weakness, numbness and headaches  Hematological: Negative for adenopathy  Psychiatric/Behavioral: Negative for confusion, hallucinations, self-injury and suicidal ideas  The patient is not nervous/anxious  Physical Exam  Physical Exam  Vitals and nursing note reviewed  Constitutional:       General: He is not in acute distress  Appearance: He is well-developed  He is not diaphoretic  HENT:      Head: Normocephalic and atraumatic        Right Ear: External ear normal       Left Ear: External ear normal       Nose: Nose normal    Eyes:      Conjunctiva/sclera: Conjunctivae normal       Pupils: Pupils are equal, round, and reactive to light  Cardiovascular:      Rate and Rhythm: Normal rate and regular rhythm  Heart sounds: Normal heart sounds  Pulmonary:      Effort: Pulmonary effort is normal       Breath sounds: Normal breath sounds  Abdominal:      General: Bowel sounds are normal       Palpations: Abdomen is soft  Tenderness: There is abdominal tenderness in the epigastric area  Musculoskeletal:         General: Normal range of motion  Cervical back: Normal range of motion and neck supple  Skin:     General: Skin is warm and dry  Neurological:      Mental Status: He is alert and oriented to person, place, and time  Deep Tendon Reflexes: Reflexes are normal and symmetric           Vital Signs  ED Triage Vitals   Temperature Pulse Respirations Blood Pressure SpO2   05/18/23 1514 05/18/23 1514 05/18/23 1514 05/18/23 1514 05/18/23 1514   98 8 °F (37 1 °C) (!) 112 19 130/82 99 %      Temp src Heart Rate Source Patient Position - Orthostatic VS BP Location FiO2 (%)   -- 05/18/23 1915 05/18/23 1915 05/18/23 1915 --    Monitor Sitting Left arm       Pain Score       05/18/23 1534       10 - Worst Possible Pain           Vitals:    05/18/23 1514 05/18/23 1915   BP: 130/82 125/76   Pulse: (!) 112 79   Patient Position - Orthostatic VS:  Sitting         Visual Acuity      ED Medications  Medications   HYDROmorphone (DILAUDID) injection 1 mg (1 mg Intravenous Given 5/18/23 1534)   ondansetron (ZOFRAN) injection 4 mg (4 mg Intravenous Given 5/18/23 1535)   sodium chloride 0 9 % bolus 1,000 mL (0 mL Intravenous Stopped 5/18/23 1700)   iohexol (OMNIPAQUE) 240 MG/ML solution 50 mL (50 mL Oral Given 5/18/23 1608)   ondansetron (ZOFRAN) injection 4 mg (4 mg Intravenous Given 5/18/23 1747)   HYDROmorphone (DILAUDID) injection 1 mg (1 mg Intravenous Given 5/18/23 1749) aluminum-magnesium hydroxide-simethicone (MYLANTA) oral suspension 30 mL (30 mL Oral Given 5/18/23 1943)   HYDROmorphone (DILAUDID) injection 0 5 mg (0 5 mg Intravenous Given 5/18/23 1943)       Diagnostic Studies  Results Reviewed     Procedure Component Value Units Date/Time    Comprehensive metabolic panel [[id number]]  (Abnormal) Collected: 05/18/23 1530    Lab Status: Final result Specimen: Blood from Line, Venous Updated: 05/18/23 1558     Sodium 137 mmol/L      Potassium 4 1 mmol/L      Chloride 103 mmol/L      CO2 26 mmol/L      ANION GAP 8 mmol/L      BUN 8 mg/dL      Creatinine 0 85 mg/dL      Glucose 163 mg/dL      Calcium 9 5 mg/dL      AST 31 U/L      ALT 32 U/L      Alkaline Phosphatase 99 U/L      Total Protein 7 4 g/dL      Albumin 4 2 g/dL      Total Bilirubin 0 48 mg/dL      eGFR 116 ml/min/1 73sq m     Narrative:      [place] guidelines for Chronic Kidney Disease (CKD):   •  Stage 1 with normal or high GFR (GFR > 90 mL/min/1 73 square meters)  •  Stage 2 Mild CKD (GFR = 60-89 mL/min/1 73 square meters)  •  Stage 3A Moderate CKD (GFR = 45-59 mL/min/1 73 square meters)  •  Stage 3B Moderate CKD (GFR = 30-44 mL/min/1 73 square meters)  •  Stage 4 Severe CKD (GFR = 15-29 mL/min/1 73 square meters)  •  Stage 5 End Stage CKD (GFR <15 mL/min/1 73 square meters)  Note: GFR calculation is accurate only with a steady state creatinine    Lipase [[id number]]  (Normal) Collected: 05/18/23 1530    Lab Status: Final result Specimen: Blood from Line, Venous Updated: 05/18/23 1558     Lipase 20 u/L     CBC and differential [[id number]]  (Abnormal) Collected: 05/18/23 1530    Lab Status: Final result Specimen: Blood from Line, Venous Updated: 05/18/23 1541     WBC 11 74 Thousand/uL      RBC 5 74 Million/uL      Hemoglobin 11 0 g/dL      Hematocrit 36 0 %      MCV 63 fL      MCH 19 2 pg      MCHC 30 6 g/dL      RDW 18 6 %      MPV 8 9 fL      Platelets 421 Thousands/uL      nRBC 0 /100 WBCs Neutrophils Relative 74 %      Immat GRANS % 0 %      Lymphocytes Relative 16 %      Monocytes Relative 5 %      Eosinophils Relative 4 %      Basophils Relative 1 %      Neutrophils Absolute 8 71 Thousands/µL      Immature Grans Absolute 0 04 Thousand/uL      Lymphocytes Absolute 1 83 Thousands/µL      Monocytes Absolute 0 61 Thousand/µL      Eosinophils Absolute 0 42 Thousand/µL      Basophils Absolute 0 13 Thousands/µL                  CT abdomen pelvis wo contrast   Final Result by Fuad Meléndez MD (05/18 1928)      No imaging findings of small bowel obstruction with postsurgical changes status post colectomy  Low-attenuation of blood pool in the cardiac chambers, compatible with anemia  Workstation performed: [id number]         XR abdomen obstruction series   Final Result by Nick Gutierrez MD (05/18 1652)      Paucity of bowel gas  Please see subsequent abdominopelvic CT which was ordered  Workstation performed: [id number]                    Procedures  Procedures         ED Course                               SBIRT 22yo+    Flowsheet Row Most Recent Value   Initial Alcohol Screen: US AUDIT-C     1  How often do you have a drink containing alcohol? 0 Filed at: 05/18/2023 1509   Audit-C Score 0 Filed at: 05/18/2023 1509   ROD: How many times in the past year have you    Used an illegal drug or used a prescription medication for non-medical reasons? Never Filed at: 05/18/2023 1509                    MDM    Disposition  Final diagnoses:   Epigastric pain     Time reflects when diagnosis was documented in both MDM as applicable and the Disposition within this note     Time User Action Codes Description Comment    5/18/2023  7:56 PM Aissatou Rivera Add [R10 13] Epigastric pain       ED Disposition     ED Disposition   Discharge    Condition   Stable    Date/Time   Thu May 18, 2023  7:56 PM    Comment Ulysses Caulk discharge to home/self care                 Follow-up Information Follow up With Specialties Details Why [facility] [facility],  Family Medicine Schedule an appointment as soon as possible for a visit  As needed [address] [facility]  [phone]            Patient's Medications   Discharge Prescriptions    No medications on file       No discharge procedures on file      PDMP Review       Value Time User    PDMP Reviewed  Yes 5/18/2023 11:10 AM Valorie Loera DO          ED Provider  Electronically Signed by           Meek Hatfield DO  05/18/23 1958

## 2023-05-18 NOTE — PATIENT INSTRUCTIONS
We could try changing the ambien to restoril, another benzodiazepine, not to be used with xanax, just for sleep to see if it works better with sleep onset  Starting dose is 7 5mg at night  Max dose in future is up to 30mg at bedtime  An non controlled option in place of xanax could be hydoxyzine (anti histamine based) in the future

## 2023-05-18 NOTE — PROGRESS NOTES
Assessment/Plan:    No problem-specific Assessment & Plan notes found for this encounter  Insomnia  Mostly DFA  Change ambien 12 5mg cr to restoril and could discuss titration if no benefit in 5-7d  BZD caution with opioids aware    Anxiety unchanged  Mostly situational  Not interested in SSRI  Use xanax sparingly    Left groin pain  Possible inguinal hernia  Check US, stat    Left scrotal pain  Check US scrotum, stat  No swelling or discoloration noted    We could try changing the ambien to restoril, another benzodiazepine, not to be used with xanax, just for sleep to see if it works better with sleep onset  Starting dose is 7 5mg at night  Max dose in future is up to 30mg at bedtime  An non controlled option in place of xanax could be hydoxyzine (anti histamine based) in the future  Diagnoses and all orders for this visit:    Left groin pain  -     US groin/inguinal area; Future    Other insomnia  -     Discontinue: temazepam (RESTORIL) 7 5 mg capsule; Take 1 capsule (7 5 mg total) by mouth daily at bedtime as needed for sleep Avoid use with xanax  Situational anxiety    Left testicular pain  -     Cancel: US scrotum and testicles; Future  -     US scrotum and groin area; Future    Ankylosing spondylitis of multiple sites in spine [facility])    History of ulcerative colitis        No follow-ups on file  Subjective:      Patient ID: Ulysses Caulk is a 25 y o  male  Chief Complaint   Patient presents with   • Hospital Follow-up     L  Suazo/LPN       HPI  Had surgery 4/18/23  Melonie Millerte to [facility] last Friday  Was in there, had CT and released  Having left testicle and LLQ pain  Urine darker brown, not red  tesiticle pain with urination   No fever  Tylenol ATC  Has thal    Wounds ok per pt  Dressing changes done  Some discharge but not purulent    Not sleeping  Always anxious  Feeling depressed    Denies suicidal/homicidal/destructive ideations      Xanax daily lately  At end of day of work  No current "opioids  Ongoing insomnia  DFA mostly  ambien works better than not taking    Has ankylosing spondilitis  cymbalta not effective for AS symptoms    The following portions of the patient's history were reviewed and updated as appropriate: allergies, current medications, past family history, past medical history, past social history, past surgical history and problem list     Review of Systems   Constitutional: Negative for chills and fever  Genitourinary: Negative for dysuria  Current Outpatient Medications   Medication Sig Dispense Refill   • acetaminophen (TYLENOL) 325 mg tablet Take 2 tablets (650 mg total) by mouth every 6 (six) hours as needed for mild pain, headaches or fever  0   • ALPRAZolam (XANAX) 0 5 mg tablet 1-2 po q8hrs prn anxiety, avoid use with opioids 90 tablet 0   • loperamide (IMODIUM) 2 mg capsule Take 1 capsule (2 mg total) by mouth 3 (three) times a day before meals Do not start before March 16, 2023  30 capsule 0   • diphenoxylate-atropine (LOMOTIL) 2 5-0 025 mg per tablet Take 2 tablets by mouth (Patient not taking: Reported on 5/18/2023)     • HYDROmorphone (DILAUDID) 2 mg tablet  (Patient not taking: Reported on 5/18/2023)     • temazepam (RESTORIL) 7 5 mg capsule Take 1 capsule (7 5 mg total) by mouth daily at bedtime as needed for sleep Avoid use with xanax and opioids 30 capsule 0     No current facility-administered medications for this visit  Objective:    /80   Pulse 72   Temp 98 3 °F (36 8 °C) (Tympanic)   Resp 18   Ht 5' 9\" (1 753 m)   Wt 79 4 kg (175 lb)   SpO2 99%   BMI 25 84 kg/m²        Physical Exam  Vitals and nursing note reviewed  Constitutional:       General: He is not in acute distress  Appearance: He is well-developed  He is not ill-appearing  HENT:      Head: Normocephalic  Eyes:      General: No scleral icterus  Conjunctiva/sclera: Conjunctivae normal    Cardiovascular:      Rate and Rhythm: Normal rate and regular rhythm       " Heart sounds: No murmur heard  Pulmonary:      Effort: Pulmonary effort is normal  No respiratory distress  Breath sounds: No wheezing  Abdominal:      General: There is no distension  Palpations: Abdomen is soft  Tenderness: There is no rebound  Musculoskeletal:         General: No deformity  Cervical back: Neck supple  Skin:     General: Skin is warm and dry  Coloration: Skin is not jaundiced or pale  Neurological:      Mental Status: He is alert  Gait: Gait normal    Psychiatric:         Mood and Affect: Mood normal          Behavior: Behavior normal          Thought Content: Thought content normal          41 minutes spent with patient and with chart review and documentation completion           Georges Stevens DO [facility]

## 2023-05-19 ENCOUNTER — PATIENT OUTREACH (OUTPATIENT)
Dept: FAMILY MEDICINE CLINIC | Facility: CLINIC | Age: 28
End: 2023-05-19

## 2023-05-19 NOTE — PROGRESS NOTES
ADT Alert received via IB  Chart was reviewed  Patient presented to the ER on 5/18/23 with c/o abdominal pain and nausea and dc'd same day  Patient was treated with IV Dilaudid x3, IV Zofran x2, IVF's and po Mylanta  RNCM to follow

## 2023-05-20 ENCOUNTER — HOSPITAL ENCOUNTER (EMERGENCY)
Facility: HOSPITAL | Age: 28
Discharge: HOME | End: 2023-05-20
Attending: EMERGENCY MEDICINE

## 2023-05-20 ENCOUNTER — APPOINTMENT (EMERGENCY)
Dept: RADIOLOGY | Facility: HOSPITAL | Age: 28
End: 2023-05-20

## 2023-05-20 VITALS
DIASTOLIC BLOOD PRESSURE: 70 MMHG | OXYGEN SATURATION: 96 % | TEMPERATURE: 98.7 F | SYSTOLIC BLOOD PRESSURE: 117 MMHG | RESPIRATION RATE: 20 BRPM | HEART RATE: 80 BPM

## 2023-05-20 DIAGNOSIS — K56.7 ILEUS (HCC): ICD-10-CM

## 2023-05-20 DIAGNOSIS — K51.90 ULCERATIVE COLITIS (HCC): ICD-10-CM

## 2023-05-20 DIAGNOSIS — R10.9 ABDOMINAL PAIN: Primary | ICD-10-CM

## 2023-05-20 LAB
ALBUMIN SERPL BCP-MCNC: 4.3 G/DL (ref 3.5–5)
ALP SERPL-CCNC: 105 U/L (ref 34–104)
ALT SERPL W P-5'-P-CCNC: 38 U/L (ref 7–52)
ANION GAP SERPL CALCULATED.3IONS-SCNC: 13 MMOL/L (ref 4–13)
AST SERPL W P-5'-P-CCNC: 23 U/L (ref 13–39)
BASOPHILS # BLD AUTO: 0.11 THOUSANDS/ÂΜL (ref 0–0.1)
BASOPHILS NFR BLD AUTO: 1 % (ref 0–1)
BILIRUB SERPL-MCNC: 0.51 MG/DL (ref 0.2–1)
BUN SERPL-MCNC: 10 MG/DL (ref 5–25)
CALCIUM SERPL-MCNC: 9.4 MG/DL (ref 8.4–10.2)
CHLORIDE SERPL-SCNC: 105 MMOL/L (ref 96–108)
CO2 SERPL-SCNC: 21 MMOL/L (ref 21–32)
CREAT SERPL-MCNC: 0.86 MG/DL (ref 0.6–1.3)
EOSINOPHIL # BLD AUTO: 0.43 THOUSAND/ÂΜL (ref 0–0.61)
EOSINOPHIL NFR BLD AUTO: 5 % (ref 0–6)
ERYTHROCYTE [DISTWIDTH] IN BLOOD BY AUTOMATED COUNT: 18.8 % (ref 11.6–15.1)
GFR SERPL CREATININE-BSD FRML MDRD: 116 ML/MIN/1.73SQ M
GLUCOSE SERPL-MCNC: 98 MG/DL (ref 65–140)
HCT VFR BLD AUTO: 37.8 % (ref 36.5–49.3)
HGB BLD-MCNC: 11.4 G/DL (ref 12–17)
IMM GRANULOCYTES # BLD AUTO: 0.03 THOUSAND/UL (ref 0–0.2)
IMM GRANULOCYTES NFR BLD AUTO: 0 % (ref 0–2)
LYMPHOCYTES # BLD AUTO: 1.85 THOUSANDS/ÂΜL (ref 0.6–4.47)
LYMPHOCYTES NFR BLD AUTO: 21 % (ref 14–44)
MCH RBC QN AUTO: 19.3 PG (ref 26.8–34.3)
MCHC RBC AUTO-ENTMCNC: 30.2 G/DL (ref 31.4–37.4)
MCV RBC AUTO: 64 FL (ref 82–98)
MONOCYTES # BLD AUTO: 0.69 THOUSAND/ÂΜL (ref 0.17–1.22)
MONOCYTES NFR BLD AUTO: 8 % (ref 4–12)
NEUTROPHILS # BLD AUTO: 5.64 THOUSANDS/ÂΜL (ref 1.85–7.62)
NEUTS SEG NFR BLD AUTO: 65 % (ref 43–75)
NRBC BLD AUTO-RTO: 0 /100 WBCS
PLATELET # BLD AUTO: 448 THOUSANDS/UL (ref 149–390)
PMV BLD AUTO: 8.6 FL (ref 8.9–12.7)
POTASSIUM SERPL-SCNC: 3.5 MMOL/L (ref 3.5–5.3)
PROT SERPL-MCNC: 7.6 G/DL (ref 6.4–8.4)
RBC # BLD AUTO: 5.91 MILLION/UL (ref 3.88–5.62)
SODIUM SERPL-SCNC: 139 MMOL/L (ref 135–147)
WBC # BLD AUTO: 8.75 THOUSAND/UL (ref 4.31–10.16)

## 2023-05-20 RX ORDER — HYDROMORPHONE HCL/PF 1 MG/ML
1 SYRINGE (ML) INJECTION ONCE
Status: COMPLETED | OUTPATIENT
Start: 2023-05-20 | End: 2023-05-20

## 2023-05-20 RX ORDER — METOCLOPRAMIDE HYDROCHLORIDE 5 MG/ML
10 INJECTION INTRAMUSCULAR; INTRAVENOUS ONCE
Status: COMPLETED | OUTPATIENT
Start: 2023-05-20 | End: 2023-05-20

## 2023-05-20 RX ORDER — ONDANSETRON 2 MG/ML
4 INJECTION INTRAMUSCULAR; INTRAVENOUS ONCE
Status: COMPLETED | OUTPATIENT
Start: 2023-05-20 | End: 2023-05-20

## 2023-05-20 RX ADMIN — ONDANSETRON 4 MG: 2 INJECTION INTRAMUSCULAR; INTRAVENOUS at 16:07

## 2023-05-20 RX ADMIN — HYDROMORPHONE HYDROCHLORIDE 1 MG: 1 INJECTION, SOLUTION INTRAMUSCULAR; INTRAVENOUS; SUBCUTANEOUS at 16:06

## 2023-05-20 RX ADMIN — HYDROMORPHONE HYDROCHLORIDE 1 MG: 1 INJECTION, SOLUTION INTRAMUSCULAR; INTRAVENOUS; SUBCUTANEOUS at 14:31

## 2023-05-20 RX ADMIN — SODIUM CHLORIDE 1000 ML: 0.9 INJECTION, SOLUTION INTRAVENOUS at 14:31

## 2023-05-20 RX ADMIN — METOCLOPRAMIDE 10 MG: 5 INJECTION, SOLUTION INTRAMUSCULAR; INTRAVENOUS at 14:33

## 2023-05-20 RX ADMIN — SODIUM CHLORIDE 1000 ML: 0.9 INJECTION, SOLUTION INTRAVENOUS at 16:12

## 2023-05-20 NOTE — ED PROVIDER NOTES
History  Chief Complaint   Patient presents with   • Abdominal Pain     Patient states he had a large amount of output from ileostomy overnight that suddenly stopped around 5 am  Abdominal pain, nausea since     Patient has a history of complicated ulcerative colitis, status post multiple abdominal procedures including total colectomy  He recently had his J-pouch diverted, and formation of a new ileal pouch with ileostomy  Patient was seen in the ED 2 days ago with abdominal pain and discomfort  He was treated and released after improvement  He comes back in today stating his ileostomy had a large amount of output yesterday and then suddenly stopped at about 5 AM today  Abdomen became distended and he has been having more pain  Patient is afebrile  He has not been passing any blood  States he feels quite nauseous but is not vomiting  Is requesting hydration, antiemetics and pain control  Prior to Admission Medications   Prescriptions Last Dose Informant Patient Reported? Taking? ALPRAZolam (XANAX) 0 5 mg tablet   No No   Si-2 po q8hrs prn anxiety, avoid use with opioids   HYDROmorphone (DILAUDID) 2 mg tablet   Yes No   Patient not taking: Reported on 2023   acetaminophen (TYLENOL) 325 mg tablet   No No   Sig: Take 2 tablets (650 mg total) by mouth every 6 (six) hours as needed for mild pain, headaches or fever   diphenoxylate-atropine (LOMOTIL) 2 5-0 025 mg per tablet   Yes No   Sig: Take 2 tablets by mouth   Patient not taking: Reported on 2023   loperamide (IMODIUM) 2 mg capsule   No No   Sig: Take 1 capsule (2 mg total) by mouth 3 (three) times a day before meals Do not start before 2023     temazepam (RESTORIL) 7 5 mg capsule   No No   Sig: Take 1 capsule (7 5 mg total) by mouth daily at bedtime as needed for sleep Avoid use with xanax and opioids      Facility-Administered Medications: None       Past Medical History:   Diagnosis Date   • [facility] • Anxiety    • Bowel obstruction ([facility])    • Clostridium difficile colitis 9/13/2018   • Colitis    • 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[facility] ) 9/13/2018   • Pancreatitis    • Ulcerative colitis ([facility] )        Past Surgical History:   Procedure Laterality Date   • COLECTOMY TOTAL      with ileal pouch and anastemosis   • IR PICC PLACEMENT SINGLE LUMEN  3/1/2022   • TOTAL COLECTOMY         Family History   Problem Relation Age of Onset   • Lung disease Neg Hx      I have reviewed and agree with the history as documented  E-Cigarette/Vaping   • E-Cigarette Use Never User      E-Cigarette/Vaping Substances   • Nicotine No    • THC No    • CBD No    • Flavoring No    • Other No    • Unknown No      Social History     Tobacco Use   • Smoking status: Never   • Smokeless tobacco: Never   Vaping Use   • Vaping Use: Never used   Substance Use Topics   • Alcohol use: Not Currently     Comment: pt states 5 a month/socially   • Drug use: No       Review of Systems   Constitutional: Positive for diaphoresis  Negative for chills and fever  HENT: Negative for congestion and sore throat  Eyes: Negative for visual disturbance  Respiratory: Negative for cough and shortness of breath  Cardiovascular: Negative for chest pain  Gastrointestinal: Positive for abdominal distention, abdominal pain and nausea  Negative for anal bleeding, blood in stool and vomiting  Genitourinary: Negative for dysuria  Musculoskeletal: Negative for back pain  Skin: Negative for rash  Neurological: Positive for weakness  Negative for headaches  Hematological: Does not bruise/bleed easily  Psychiatric/Behavioral: Negative for confusion  The patient is nervous/anxious  All other systems reviewed and are negative  Physical Exam  Physical Exam  Vitals and nursing note reviewed  Constitutional:       Appearance: He is well-developed  HENT:      Head: Normocephalic        Mouth/Throat:      Mouth: Mucous membranes are moist    Eyes: Extraocular Movements: Extraocular movements intact  Cardiovascular:      Rate and Rhythm: Normal rate and regular rhythm  Pulmonary:      Effort: Pulmonary effort is normal    Abdominal:      General: Abdomen is flat  Bowel sounds are absent  Palpations: Abdomen is soft  Tenderness: There is generalized abdominal tenderness  Skin:     General: Skin is warm and dry  Capillary Refill: Capillary refill takes less than 2 seconds  Neurological:      General: No focal deficit present  Mental Status: He is alert and oriented to person, place, and time     Psychiatric:         Mood and Affect: Mood normal          Behavior: Behavior normal          Vital Signs  ED Triage Vitals   Temperature Pulse Respirations Blood Pressure SpO2   05/20/23 1414 05/20/23 1414 05/20/23 1410 05/20/23 1412 05/20/23 1412   98 7 °F (37 1 °C) (!) 125 18 136/77 98 %      Temp Source Heart Rate Source Patient Position - Orthostatic VS BP Location FiO2 (%)   05/20/23 1414 05/20/23 1410 05/20/23 1412 05/20/23 1412 --   Oral Monitor Lying Left arm       Pain Score       05/20/23 1431       8           Vitals:    05/20/23 1415 05/20/23 1529 05/20/23 1530 05/20/23 1614   BP: 136/77 128/76 128/73 117/70   Pulse:  100  80   Patient Position - Orthostatic VS:  Lying           Visual Acuity      ED Medications  Medications   sodium chloride 0 9 % bolus 1,000 mL (0 mL Intravenous Stopped 5/20/23 1531)   HYDROmorphone (DILAUDID) injection 1 mg (1 mg Intravenous Given 5/20/23 1431)   metoclopramide (REGLAN) injection 10 mg (10 mg Intravenous Given 5/20/23 1433)   HYDROmorphone (DILAUDID) injection 1 mg (1 mg Intravenous Given 5/20/23 1606)   ondansetron (ZOFRAN) injection 4 mg (4 mg Intravenous Given 5/20/23 1607)   sodium chloride 0 9 % bolus 1,000 mL (0 mL Intravenous Stopped 5/20/23 1723)       Diagnostic Studies  Results Reviewed     Procedure Component Value Units Date/Time    Comprehensive metabolic panel [[id number]] (Abnormal) Collected: 05/20/23 1431    Lab Status: Final result Specimen: Blood from Arm, Right Updated: 05/20/23 1456     Sodium 139 mmol/L      Potassium 3 5 mmol/L      Chloride 105 mmol/L      CO2 21 mmol/L      ANION GAP 13 mmol/L      BUN 10 mg/dL      Creatinine 0 86 mg/dL      Glucose 98 mg/dL      Calcium 9 4 mg/dL      AST 23 U/L      ALT 38 U/L      Alkaline Phosphatase 105 U/L      Total Protein 7 6 g/dL      Albumin 4 3 g/dL      Total Bilirubin 0 51 mg/dL      eGFR 116 ml/min/1 73sq m     Narrative:      National Kid[employer] Foundation guidelines for Chronic Kidney Disease (CKD):   •  Stage 1 with normal or high GFR (GFR > 90 mL/min/1 73 square meters)  •  Stage 2 Mild CKD (GFR = 60-89 mL/min/1 73 square meters)  •  Stage 3A Moderate CKD (GFR = 45-59 mL/min/1 73 square meters)  •  Stage 3B Moderate CKD (GFR = 30-44 mL/min/1 73 square meters)  •  Stage 4 Severe CKD (GFR = 15-29 mL/min/1 73 square meters)  •  Stage 5 End Stage CKD (GFR <15 mL/min/1 73 square meters)  Note: GFR calculation is accurate only with a steady state creatinine    CBC and differential [[id number]]  (Abnormal) Collected: 05/20/23 1431    Lab Status: Final result Specimen: Blood from Arm, Right Updated: 05/20/23 1441     WBC 8 75 Thousand/uL      RBC 5 91 Million/uL      Hemoglobin 11 4 g/dL      Hematocrit 37 8 %      MCV 64 fL      MCH 19 3 pg      MCHC 30 2 g/dL      RDW 18 8 %      MPV 8 6 fL      Platelets 930 Thousands/uL      nRBC 0 /100 WBCs      Neutrophils Relative 65 %      Immat GRANS % 0 %      Lymphocytes Relative 21 %      Monocytes Relative 8 %      Eosinophils Relative 5 %      Basophils Relative 1 %      Neutrophils Absolute 5 64 Thousands/µL      Immature Grans Absolute 0 03 Thousand/uL      Lymphocytes Absolute 1 85 Thousands/µL      Monocytes Absolute 0 69 Thousand/µL      Eosinophils Absolute 0 43 Thousand/µL      Basophils Absolute 0 11 Thousands/µL                  XR abdomen obstruction series    (Results Pending)              Procedures  Procedures         ED Course                               SBIRT 22yo+    Flowsheet Row Most Recent Value   Initial Alcohol Screen: US AUDIT-C     1  How often do you have a drink containing alcohol? 0 Filed at: 05/20/2023 1413   Audit-C Score 0 Filed at: 05/20/2023 1413                    Medical Decision Making  Patient has been seen by me several times in the past   We will hydrate provide antiemetics and pain control  Check obstructive series  Will observe for return of bowel function    Amount and/or Complexity of Data Reviewed  Labs: ordered  Radiology: ordered  Risk  Prescription drug management  Disposition  Final diagnoses:   Abdominal pain   Ileus ([facility] )   Ulcerative colitis ([facility] )     Time reflects when diagnosis was documented in both MDM as applicable and the Disposition within this note     Time User Action Codes Description Comment    5/20/2023  4:35 PM Prince Amis A Add [R10 9] Abdominal pain     5/20/2023  4:35 PM Cori Calzada Add [K56 7] Ileus ([facility] )     5/20/2023  4:35 PM Prince Amis A Add [K51 90] Ulcerative colitis [facility])       ED Disposition     ED Disposition   Discharge    Condition   Stable    Date/Time   Sat May 20, 2023  4:35 PM    Comment   Abran Lemon discharge to home/self care                 Follow-up Information     Follow up With Specialties Details [facility],  Family Medicine Schedule an appointment as soon as possible for a visit   [id number]  [phone]            Discharge Medication List as of 5/20/2023  4:36 PM      CONTINUE these medications which have NOT CHANGED    Details   acetaminophen (TYLENOL) 325 mg tablet Take 2 tablets (650 mg total) by mouth every 6 (six) hours as needed for mild pain, headaches or fever, Starting Tue 1/24/2023, No Print      ALPRAZolam (XANAX) 0 5 mg tablet 1-2 po q8hrs prn anxiety, avoid use with opioids, Normal diphenoxylate-atropine (LOMOTIL) 2 5-0 025 mg per tablet Take 2 tablets by mouth, Starting Mon 2/20/2023, Historical Med      HYDROmorphone (DILAUDID) 2 mg tablet Starting Mon 5/1/2023, Historical Med      loperamide (IMODIUM) 2 mg capsule Take 1 capsule (2 mg total) by mouth 3 (three) times a day before meals Do not start before March 16, 2023 , Starting u 3/16/2023, Normal      temazepam (RESTORIL) 7 5 mg capsule Take 1 capsule (7 5 mg total) by mouth daily at bedtime as needed for sleep Avoid use with xanax and opioids, Starting Thu 5/18/2023, Normal             No discharge procedures on file      PDMP Review       Value Time User    PDMP Reviewed  Yes 5/18/2023 11:10 AM Steven Tamez DO          ED Provider  Electronically Signed by           Abhishek Jones MD  05/21/23 [id number]

## 2023-05-22 ENCOUNTER — TELEPHONE (OUTPATIENT)
Dept: FAMILY MEDICINE CLINIC | Facility: CLINIC | Age: 28
End: 2023-05-22

## 2023-05-22 NOTE — TELEPHONE ENCOUNTER
Tied calling pharmacy and they hung up  Called again and kept me on hold for 10 min  Will try again later    Stephanie Potter

## 2023-05-22 NOTE — TELEPHONE ENCOUNTER
Patient called on Friday and left a message stating that the pharmacy still will not fill his prescription  Please call the patient back  [phone]

## 2023-05-23 NOTE — TELEPHONE ENCOUNTER
Received a fax to fill out for Prior Auth  Filled out and faxed to number provided  Please note fax confirmation     Braden Prajapati LPN

## 2023-05-24 ENCOUNTER — PATIENT OUTREACH (OUTPATIENT)
Dept: FAMILY MEDICINE CLINIC | Facility: CLINIC | Age: 28
End: 2023-05-24

## 2023-05-24 NOTE — TELEPHONE ENCOUNTER
Called and spoke w pharmacy  They stated they just received this medication today and will have it ready for pickup later today  They will inform patient    No further action  Misty Wilson, CMA

## 2023-05-24 NOTE — TELEPHONE ENCOUNTER
Called patient in regards to medication- Temazepam  The prior auth was approved   Left message on machine for him to call us back  Shola Mueller CMA

## 2023-05-24 NOTE — PROGRESS NOTES
Chart was reviewed and this RNCM called patient for follow up  Patient states he is doing well today  He says he has his good days and bad days  He states he still has the pain which comes and goes  He still is unable to get pain management due to the nature of the pain  Patient states he will be scheduling a f/u apt in June to see the surgeon  He will have the 3rd and last phase of the J pouch revision done in July if all goes well  He states the surgeon states that this final surgery should resolve the ongoing pain of ileus  He declines any needs at this time  He is agreeable to follow up next month  RNCM to follow

## 2023-05-28 NOTE — ED PROVIDER NOTES
History  Chief Complaint   Patient presents with   • Abdominal Pain     Patient was here in the ED last night for vomiting and was discharged  Today his pain is 8/10 in the abdomen and is nauseous  Pt in the ED with c/o recurrent abd pain  He has a hx of bowel obstruction, ileal pouchitis, ulcerative colitis, s/p colectomy  He was evaluated in the ED yesterday, for the same complaint, labs and CT with oral and IV contrast were obtained and negative for acute pathology  Pt was pain-free at the time of discharge yesterday  He states that abdominal pain recurred today while he was at work  He denies nausea or vomiting  Patient has not taken any medication for pain relief  History provided by:  Patient  History limited by:  Acuity of condition   used: No    Abdominal Pain  Pain location:  Generalized  Pain quality: aching    Pain radiates to:  Does not radiate  Pain severity:  Mild  Onset quality:  Sudden  Timing:  Intermittent  Progression:  Waxing and waning  Chronicity:  Recurrent  Relieved by:  None tried  Worsened by:  Nothing  Ineffective treatments:  None tried  Associated symptoms: no chest pain, no chills, no constipation, no cough, no diarrhea, no dysuria, no fever, no hematuria, no nausea, no shortness of breath and no vomiting    Risk factors: multiple surgeries        Prior to Admission Medications   Prescriptions Last Dose Informant Patient Reported? Taking?    ALPRAZolam (XANAX) 0 5 mg tablet Unknown  No No   Si-2 po q8hrs prn anxiety   DULoxetine (CYMBALTA) 60 mg delayed release capsule 2023  No Yes   Sig: Take 1 capsule (60 mg total) by mouth daily   acetaminophen (TYLENOL) 325 mg tablet Unknown  No No   Sig: Take 2 tablets (650 mg total) by mouth every 6 (six) hours as needed for mild pain, headaches or fever   diphenoxylate-atropine (LOMOTIL) 2 5-0 025 mg per tablet Unknown  No No   Sig: Take 1 tablet by mouth 2 (two) times a day as needed for diarrhea for up to 10 days Prior to meals   ondansetron (ZOFRAN-ODT) 4 mg disintegrating tablet   No No   Sig: Take 1 tablet (4 mg total) by mouth every 6 (six) hours as needed for nausea for up to 3 days      Facility-Administered Medications: None       Past Medical History:   Diagnosis Date   • Ankylosing spondylitis ([facility] )    • Anxiety    • Bowel obstruction (HCC)    • Clostridium difficile colitis 9/13/2018   • Colitis    • 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[facility] ) 9/13/2018   • Pancreatitis    • Ulcerative colitis ([facility] )        Past Surgical History:   Procedure Laterality Date   • COLECTOMY TOTAL      with ileal pouch and anastemosis   • IR PICC PLACEMENT SINGLE LUMEN  3/1/2022   • TOTAL COLECTOMY         Family History   Problem Relation Age of Onset   • Lung disease Neg Hx      I have reviewed and agree with the history as documented  E-Cigarette/Vaping   • E-Cigarette Use Never User      E-Cigarette/Vaping Substances   • Nicotine No    • THC No    • CBD No    • Flavoring No    • Other No    • Unknown No      Social History     Tobacco Use   • Smoking status: Never   • Smokeless tobacco: Never   Vaping Use   • Vaping Use: Never used   Substance Use Topics   • Alcohol use: Not Currently     Comment: pt states 5 a month/socially   • Drug use: No       Review of Systems   Constitutional: Negative for chills and fever  Respiratory: Negative for cough, shortness of breath and wheezing  Cardiovascular: Negative for chest pain and palpitations  Gastrointestinal: Positive for abdominal pain  Negative for constipation, diarrhea, nausea and vomiting  Genitourinary: Negative for dysuria, flank pain, hematuria and urgency  Musculoskeletal: Negative for back pain  Skin: Negative for color change and rash  All other systems reviewed and are negative  Physical Exam  Physical Exam  Vitals and nursing note reviewed  Constitutional:       Appearance: He is well-developed  HENT:      Head: Normocephalic and atraumatic     Eyes:      Pupils: Pupils are equal, round, and reactive to light  Cardiovascular:      Rate and Rhythm: Normal rate and regular rhythm  Heart sounds: Normal heart sounds  Pulmonary:      Effort: Pulmonary effort is normal       Breath sounds: Normal breath sounds  Abdominal:      General: A surgical scar is present  Bowel sounds are normal  There is no distension  Palpations: Abdomen is soft  There is no mass  Tenderness: There is generalized abdominal tenderness  There is no guarding or rebound  Skin:     General: Skin is warm and dry  Capillary Refill: Capillary refill takes less than 2 seconds  Neurological:      General: No focal deficit present  Mental Status: He is alert and oriented to person, place, and time  Psychiatric:         Mood and Affect: Mood is anxious  Behavior: Behavior normal          Thought Content:  Thought content normal          Judgment: Judgment normal          Vital Signs  ED Triage Vitals   Temperature Pulse Respirations Blood Pressure SpO2   02/27/23 1509 02/27/23 1509 02/27/23 1509 02/27/23 1511 02/27/23 1509   98 1 °F (36 7 °C) 96 20 122/85 97 %      Temp Source Heart Rate Source Patient Position - Orthostatic VS BP Location FiO2 (%)   02/27/23 1509 02/27/23 1509 02/27/23 1511 02/27/23 1511 --   Tympanic Monitor Sitting Right arm       Pain Score       02/27/23 1509       9           Vitals:    02/27/23 1509 02/27/23 1511 02/27/23 1512 02/27/23 1836   BP:  122/85  125/82   Pulse: 96  (!) 108 99   Patient Position - Orthostatic VS:  Sitting           Visual Acuity      ED Medications  Medications   ketorolac (TORADOL) injection 15 mg (15 mg Intravenous Given 2/27/23 1546)   ondansetron (ZOFRAN) injection 4 mg (4 mg Intravenous Given 2/27/23 1546)   Famotidine (PF) (PEPCID) injection 20 mg (20 mg Intravenous Given 2/27/23 1547)   HYDROmorphone (DILAUDID) injection 1 mg (1 mg Intravenous Given 2/27/23 1621)   HYDROcodone-acetaminophen (NORCO) 5-325 mg per tablet 1 tablet (1 tablet Oral Given 2/27/23 1836)   metroNIDAZOLE (FLAGYL) tablet 500 mg (500 mg Oral Given 2/27/23 1836)       Diagnostic Studies  Results Reviewed     Procedure Component Value Units Date/Time    Comprehensive metabolic panel [[id number]] Collected: 02/27/23 1534    Lab Status: Final result Specimen: Blood from Arm, Left Updated: 02/27/23 1559     Sodium 138 mmol/L      Potassium 4 1 mmol/L      Chloride 106 mmol/L      CO2 26 mmol/L      ANION GAP 6 mmol/L      BUN 11 mg/dL      Creatinine 0 92 mg/dL      Glucose 79 mg/dL      Calcium 8 7 mg/dL      AST 36 U/L      ALT 38 U/L      Alkaline Phosphatase 75 U/L      Total Protein 6 4 g/dL      Albumin 4 0 g/dL      Total Bilirubin 0 54 mg/dL      eGFR 111 ml/min/1 73sq m     Narrative:      [employer] guidelines for Chronic Kidney Disease (CKD):   •  Stage 1 with normal or high GFR (GFR > 90 mL/min/1 73 square meters)  •  Stage 2 Mild CKD (GFR = 60-89 mL/min/1 73 square meters)  •  Stage 3A Moderate CKD (GFR = 45-59 mL/min/1 73 square meters)  •  Stage 3B Moderate CKD (GFR = 30-44 mL/min/1 73 square meters)  •  Stage 4 Severe CKD (GFR = 15-29 mL/min/1 73 square meters)  •  Stage 5 End Stage CKD (GFR <15 mL/min/1 73 square meters)  Note: GFR calculation is accurate only with a steady state creatinine    Lipase [[id number]]  (Normal) Collected: 02/27/23 1534    Lab Status: Final result Specimen: Blood from Arm, Left Updated: 02/27/23 1559     Lipase 40 u/L     CBC and differential [[id number]]  (Abnormal) Collected: 02/27/23 1534    Lab Status: Final result Specimen: Blood from Arm, Left Updated: 02/27/23 1540     WBC 8 22 Thousand/uL      RBC 5 44 Million/uL      Hemoglobin 10 2 g/dL      Hematocrit 34 1 %      MCV 63 fL      MCH 18 8 pg      MCHC 29 9 g/dL      RDW 17 1 %      MPV 8 1 fL      Platelets 186 Thousands/uL      nRBC 0 /100 WBCs      Neutrophils Relative 67 %      Immat GRANS % 0 %      Lymphocytes Relative 17 % Monocytes Relative 9 %      Eosinophils Relative 5 %      Basophils Relative 2 %      Neutrophils Absolute 5 51 Thousands/µL      Immature Grans Absolute 0 02 Thousand/uL      Lymphocytes Absolute 1 43 Thousands/µL      Monocytes Absolute 0 73 Thousand/µL      Eosinophils Absolute 0 40 Thousand/µL      Basophils Absolute 0 13 Thousands/µL                  XR abdomen obstruction series   Final Result by Deni Puckett MD (02/27 1618)      Nonobstructive bowel gas pattern  Workstation performed: [id number]                    Procedures  Procedures         ED Course  ED Course as of 02/28/23 0122   Mon Feb 27, 2023   1528 Pt states Dilaudid is the only medicine that works for him  I informed him that he received multiple doses of Dilaudid last night and I was concerned about opiate induced constipation contributing to his abd pain  Will give Toradol and advance medications as needed   1725 I offered patient admission, and he agreed  I reviewed patient with admitting team who recommend speaking to colorectal surgeon for possible pouch decompression  I have contacted colorectal surgeon, Dr Jesús Vaughn, he will call me to review patient in about 1hr   1827 I reviewed pt with Colorectal surgeon - Dr Jesús Vaughn, who has reviewed pt's chart  He states that there is no need for decompression or admission at this time  He states that patient may have a pouchitis and should be started on a 2wk course of Flagyl and discharged  Pt informed as such  He requests more IV Dilaudid prior to discharge, I encouraged PO Vicodin for pain relief instead  Pt agrees with plan                               SBIRT 22yo+    Flowsheet Row Most Recent Value   SBIRT (23 yo +)    In order to provide better care to our patients, we are screening all of our patients for alcohol and drug use  Would it be okay to ask you these screening questions?  No Filed at: 02/27/2023 1512                    Medical Decision Making  39-year-old male, presents emergency department with complaint of recurrent abdominal pain  Labs ordered and reviewed  When compared to previous ED visit no significant changes noted  Obstruction series ordered and reviewed by me  No obstructive bowel gas pattern noted  I reviewed patient with colorectal surgeon, who recommends discharge to home with course of Flagyl x14 days  Patient also to follow-up with general surgeon in [place]  Will prescribe Vicodin for pain relief  Return precautions reviewed with patient  Chronic pain: acute illness or injury  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[facility] ): self-limited or minor problem  Intractable abdominal pain: acute illness or injury  Amount and/or Complexity of Data Reviewed  Labs: ordered  Radiology: ordered  Risk  Prescription drug management  Disposition  Final diagnoses:   Intractable abdominal pain   Chronic pain   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[facility] )     Time reflects when diagnosis was documented in both MDM as applicable and the Disposition within this note     Time User Action Codes Description Comment    2/27/2023  4:15 PM Genice Thien Add [R10 9] Intractable abdominal pain     2/27/2023  4:16 PM Genice Thien Add [G89 29] Chronic pain     2/27/2023  6:30 PM Genice Thien Add [K91 850] 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[facility] )     2/27/2023  6:30 PM Genice Thien Modify [R10 9] Intractable abdominal pain     2/27/2023  6:30 PM Genice Thien Modify [S54 186] Ileal pouchitis [facility])       ED Disposition     ED Disposition   Discharge    Condition   Stable    Date/Time   Mon Feb 27, 2023  6:30 PM    Comment   Samira Stewart discharge to home/self care                 Follow-up Information     Follow up With Specialties Details Why [facility], DO Family Medicine Schedule an appointment as soon as possible for a visit in 1 day for follow up [id number]  [phone]            Discharge Medication List as of 2/27/2023  6:33 PM      START taking these medications    Details   HYDROcodone-acetaminophen (Norco) 5-325 mg per tablet Take 1 tablet by mouth every 6 (six) hours as needed for pain for up to 10 days Max Daily Amount: 4 tablets, Starting Mon 2/27/2023, Until Thu 3/9/2023 at 2359, Normal      metroNIDAZOLE (FLAGYL) 500 mg tablet Take 1 tablet (500 mg total) by mouth every 8 (eight) hours for 14 days, Starting Mon 2/27/2023, Until Mon 3/13/2023, Normal         CONTINUE these medications which have NOT CHANGED    Details   DULoxetine (CYMBALTA) 60 mg delayed release capsule Take 1 capsule (60 mg total) by mouth daily, Starting Tue 2/7/2023, Normal      acetaminophen (TYLENOL) 325 mg tablet Take 2 tablets (650 mg total) by mouth every 6 (six) hours as needed for mild pain, headaches or fever, Starting Tue 1/24/2023, No Print      ALPRAZolam (XANAX) 0 5 mg tablet 1-2 po q8hrs prn anxiety, Normal      diphenoxylate-atropine (LOMOTIL) 2 5-0 025 mg per tablet Take 1 tablet by mouth 2 (two) times a day as needed for diarrhea for up to 10 days Prior to meals, Starting Mon 2/20/2023, Until Thu 3/2/2023 at 2359, Normal      ondansetron (ZOFRAN-ODT) 4 mg disintegrating tablet Take 1 tablet (4 mg total) by mouth every 6 (six) hours as needed for nausea for up to 3 days, Starting Wed 1/18/2023, Until Thu 2/16/2023 at 2359, Normal             No discharge procedures on file      PDMP Review       Value Time User    PDMP Reviewed  Yes 2/20/2023  9:51 AM Judson Severino MD          ED Provider  Electronically Signed by           Karyn Holter, DO  02/28/23 8643 DISCHARGE

## 2023-05-31 ENCOUNTER — APPOINTMENT (EMERGENCY)
Dept: RADIOLOGY | Facility: HOSPITAL | Age: 28
End: 2023-05-31

## 2023-05-31 ENCOUNTER — HOSPITAL ENCOUNTER (EMERGENCY)
Facility: HOSPITAL | Age: 28
Discharge: HOME | End: 2023-05-31
Attending: EMERGENCY MEDICINE

## 2023-05-31 VITALS
WEIGHT: 170 LBS | SYSTOLIC BLOOD PRESSURE: 132 MMHG | RESPIRATION RATE: 18 BRPM | DIASTOLIC BLOOD PRESSURE: 83 MMHG | TEMPERATURE: 97.9 F | OXYGEN SATURATION: 97 % | BODY MASS INDEX: 25.1 KG/M2 | HEART RATE: 90 BPM

## 2023-05-31 DIAGNOSIS — R10.9 ABDOMINAL PAIN: Primary | ICD-10-CM

## 2023-05-31 LAB
ALBUMIN SERPL BCP-MCNC: 5 G/DL (ref 3.5–5)
ALP SERPL-CCNC: 101 U/L (ref 34–104)
ALT SERPL W P-5'-P-CCNC: 44 U/L (ref 7–52)
ANION GAP SERPL CALCULATED.3IONS-SCNC: 10 MMOL/L (ref 4–13)
AST SERPL W P-5'-P-CCNC: 24 U/L (ref 13–39)
BASOPHILS # BLD AUTO: 0.11 THOUSANDS/ÂΜL (ref 0–0.1)
BASOPHILS NFR BLD AUTO: 1 % (ref 0–1)
BILIRUB SERPL-MCNC: 0.59 MG/DL (ref 0.2–1)
BUN SERPL-MCNC: 19 MG/DL (ref 5–25)
CALCIUM SERPL-MCNC: 9.6 MG/DL (ref 8.4–10.2)
CHLORIDE SERPL-SCNC: 100 MMOL/L (ref 96–108)
CO2 SERPL-SCNC: 24 MMOL/L (ref 21–32)
CREAT SERPL-MCNC: 0.94 MG/DL (ref 0.6–1.3)
EOSINOPHIL # BLD AUTO: 0.25 THOUSAND/ÂΜL (ref 0–0.61)
EOSINOPHIL NFR BLD AUTO: 3 % (ref 0–6)
ERYTHROCYTE [DISTWIDTH] IN BLOOD BY AUTOMATED COUNT: 18.6 % (ref 11.6–15.1)
GFR SERPL CREATININE-BSD FRML MDRD: 108 ML/MIN/1.73SQ M
GLUCOSE SERPL-MCNC: 105 MG/DL (ref 65–140)
HCT VFR BLD AUTO: 37.7 % (ref 36.5–49.3)
HGB BLD-MCNC: 11.6 G/DL (ref 12–17)
IMM GRANULOCYTES # BLD AUTO: 0.02 THOUSAND/UL (ref 0–0.2)
IMM GRANULOCYTES NFR BLD AUTO: 0 % (ref 0–2)
LIPASE SERPL-CCNC: 78 U/L (ref 11–82)
LYMPHOCYTES # BLD AUTO: 2.04 THOUSANDS/ÂΜL (ref 0.6–4.47)
LYMPHOCYTES NFR BLD AUTO: 21 % (ref 14–44)
MCH RBC QN AUTO: 18.8 PG (ref 26.8–34.3)
MCHC RBC AUTO-ENTMCNC: 30.8 G/DL (ref 31.4–37.4)
MCV RBC AUTO: 61 FL (ref 82–98)
MONOCYTES # BLD AUTO: 0.79 THOUSAND/ÂΜL (ref 0.17–1.22)
MONOCYTES NFR BLD AUTO: 8 % (ref 4–12)
NEUTROPHILS # BLD AUTO: 6.54 THOUSANDS/ÂΜL (ref 1.85–7.62)
NEUTS SEG NFR BLD AUTO: 67 % (ref 43–75)
NRBC BLD AUTO-RTO: 0 /100 WBCS
PLATELET # BLD AUTO: 529 THOUSANDS/UL (ref 149–390)
PMV BLD AUTO: 8.8 FL (ref 8.9–12.7)
POTASSIUM SERPL-SCNC: 3.4 MMOL/L (ref 3.5–5.3)
PROT SERPL-MCNC: 8.1 G/DL (ref 6.4–8.4)
RBC # BLD AUTO: 6.16 MILLION/UL (ref 3.88–5.62)
SODIUM SERPL-SCNC: 134 MMOL/L (ref 135–147)
WBC # BLD AUTO: 9.75 THOUSAND/UL (ref 4.31–10.16)

## 2023-05-31 RX ORDER — ONDANSETRON 2 MG/ML
4 INJECTION INTRAMUSCULAR; INTRAVENOUS ONCE
Status: COMPLETED | OUTPATIENT
Start: 2023-05-31 | End: 2023-05-31

## 2023-05-31 RX ORDER — METOCLOPRAMIDE HYDROCHLORIDE 5 MG/ML
10 INJECTION INTRAMUSCULAR; INTRAVENOUS ONCE
Status: COMPLETED | OUTPATIENT
Start: 2023-05-31 | End: 2023-05-31

## 2023-05-31 RX ORDER — HYDROMORPHONE HCL/PF 1 MG/ML
1 SYRINGE (ML) INJECTION ONCE
Status: COMPLETED | OUTPATIENT
Start: 2023-05-31 | End: 2023-05-31

## 2023-05-31 RX ADMIN — HYDROMORPHONE HYDROCHLORIDE 1 MG: 1 INJECTION, SOLUTION INTRAMUSCULAR; INTRAVENOUS; SUBCUTANEOUS at 16:20

## 2023-05-31 RX ADMIN — HYDROMORPHONE HYDROCHLORIDE 1 MG: 1 INJECTION, SOLUTION INTRAMUSCULAR; INTRAVENOUS; SUBCUTANEOUS at 15:03

## 2023-05-31 RX ADMIN — SODIUM CHLORIDE 1000 ML: 0.9 INJECTION, SOLUTION INTRAVENOUS at 15:11

## 2023-05-31 RX ADMIN — ONDANSETRON 4 MG: 2 INJECTION INTRAMUSCULAR; INTRAVENOUS at 16:19

## 2023-05-31 RX ADMIN — METOCLOPRAMIDE 10 MG: 5 INJECTION, SOLUTION INTRAMUSCULAR; INTRAVENOUS at 15:03

## 2023-05-31 NOTE — ED PROVIDER NOTES
History  Chief Complaint   Patient presents with   • Abdominal Pain     Left sided abdominal pain for the past 2 hours  Patient has a history of ulcerative colitis status post total colectomy, formation of ileal pouch with subsequent complications and revisions  He has had several bouts of small bowel obstruction  Patient currently has a ileostomy  He states he was well today until he had a meal of grilled chicken and immediately afterwards felt pain in the lower abdomen from the mid to lower left quadrant  Associated with bloating and distention with some gaseous eruction but no vomiting  The ileostomy has been working producing liquid and gas throughout the day  No fever  Patient states the pain started milder and continued to get worse over the last 3 hours          Prior to Admission Medications   Prescriptions Last Dose Informant Patient Reported? Taking? ALPRAZolam (XANAX) 0 5 mg tablet  Self No No   Si-2 po q8hrs prn anxiety, avoid use with opioids   HYDROmorphone (DILAUDID) 2 mg tablet  Self Yes No   Patient not taking: Reported on 2023   acetaminophen (TYLENOL) 325 mg tablet  Self No No   Sig: Take 2 tablets (650 mg total) by mouth every 6 (six) hours as needed for mild pain, headaches or fever   diphenoxylate-atropine (LOMOTIL) 2 5-0 025 mg per tablet  Self Yes No   Sig: Take 2 tablets by mouth   Patient not taking: Reported on 2023   loperamide (IMODIUM) 2 mg capsule  Self No No   Sig: Take 1 capsule (2 mg total) by mouth 3 (three) times a day before meals Do not start before 2023     temazepam (RESTORIL) 7 5 mg capsule   No No   Sig: Take 1 capsule (7 5 mg total) by mouth daily at bedtime as needed for sleep Avoid use with xanax and opioids      Facility-Administered Medications: None       Past Medical History:   Diagnosis Date   • Ankylosing spondylitis ([facility] )    • Anxiety    • Bowel obstruction (HCC)    • Clostridium difficile colitis 2018   • Colitis    • 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[facility] ) 9/13/2018   • Pancreatitis    • [facility] colitis ([facility] )        Past Surgical History:   Procedure Laterality Date   • COLECTOMY TOTAL      with ileal pouch and anastemosis   • IR PICC PLACEMENT SINGLE LUMEN  3/1/2022   • TOTAL COLECTOMY         Family History   Problem Relation Age of Onset   • Lung disease Neg Hx      I have reviewed and agree with the history as documented  E-Cigarette/Vaping   • E-Cigarette Use Never User      E-Cigarette/Vaping Substances   • Nicotine No    • THC No    • CBD No    • Flavoring No    • Other No    • Unknown No      Social History     Tobacco Use   • Smoking status: Never   • Smokeless tobacco: Never   Vaping Use   • Vaping Use: Never used   Substance Use Topics   • Alcohol use: Not Currently     Comment: pt states 5 a month/socially   • Drug use: No       Review of Systems   Constitutional: Negative for appetite change, chills and fever  HENT: Negative for congestion and sore throat  Eyes: Negative for visual disturbance  Respiratory: Negative for shortness of breath  Cardiovascular: Negative for chest pain  Gastrointestinal: Positive for abdominal distention, abdominal pain and nausea  Negative for diarrhea and vomiting  Genitourinary: Negative for decreased urine volume and dysuria  Musculoskeletal: Negative for back pain  Skin: Negative for color change and rash  Neurological: Negative for weakness and headaches  Hematological: Does not bruise/bleed easily  Psychiatric/Behavioral: The patient is nervous/anxious  All other systems reviewed and are negative  Physical Exam  Physical Exam  Vitals and nursing note reviewed  Constitutional:       Appearance: He is well-developed  HENT:      Head: Normocephalic  Mouth/Throat:      Mouth: Mucous membranes are moist    Eyes:      Extraocular Movements: Extraocular movements intact  Cardiovascular:      Rate and Rhythm: Normal rate and regular rhythm        Heart sounds: Normal heart sounds  Pulmonary:      Effort: Pulmonary effort is normal    Abdominal:      General: Abdomen is flat  Bowel sounds are decreased  Palpations: Abdomen is soft  Tenderness: There is abdominal tenderness in the left lower quadrant  There is guarding  Skin:     General: Skin is warm and dry  Capillary Refill: Capillary refill takes less than 2 seconds  Neurological:      General: No focal deficit present  Mental Status: He is alert and oriented to person, place, and time  Psychiatric:         Mood and Affect: Mood is anxious           Behavior: Behavior normal          Vital Signs  ED Triage Vitals [05/31/23 1340]   Temperature Pulse Respirations Blood Pressure SpO2   97 9 °F (36 6 °C) 94 20 132/83 97 %      Temp src Heart Rate Source Patient Position - Orthostatic VS BP Location FiO2 (%)   -- -- -- -- --      Pain Score       8           Vitals:    05/31/23 1340   BP: 132/83   Pulse: 94         Visual Acuity      ED Medications  Medications   sodium chloride 0 9 % bolus 1,000 mL (1,000 mL Intravenous New Bag 5/31/23 1511)   HYDROmorphone (DILAUDID) injection 1 mg (1 mg Intravenous Given 5/31/23 1503)   metoclopramide (REGLAN) injection 10 mg (10 mg Intravenous Given 5/31/23 1503)   HYDROmorphone (DILAUDID) injection 1 mg (1 mg Intravenous Given 5/31/23 1620)   ondansetron (ZOFRAN) injection 4 mg (4 mg Intravenous Given 5/31/23 1619)       Diagnostic Studies  Results Reviewed     Procedure Component Value Units Date/Time    Comprehensive metabolic panel [[id number]]  (Abnormal) Collected: 05/31/23 1504    Lab Status: Final result Specimen: Blood from Line, Venous Updated: 05/31/23 1533     Sodium 134 mmol/L      Potassium 3 4 mmol/L      Chloride 100 mmol/L      CO2 24 mmol/L      ANION GAP 10 mmol/L      BUN 19 mg/dL      Creatinine 0 94 mg/dL      Glucose 105 mg/dL      Calcium 9 6 mg/dL      AST 24 U/L      ALT 44 U/L      Alkaline Phosphatase 101 U/L      Total Protein 8 1 g/dL      Albumin 5 0 g/dL      Total Bilirubin 0 59 mg/dL      eGFR 108 ml/min/1 73sq m     Narrative:      [place] guidelines for Chronic Kidney Disease (CKD):   •  Stage 1 with normal or high GFR (GFR > 90 mL/min/1 73 square meters)  •  Stage 2 Mild CKD (GFR = 60-89 mL/min/1 73 square meters)  •  Stage 3A Moderate CKD (GFR = 45-59 mL/min/1 73 square meters)  •  Stage 3B Moderate CKD (GFR = 30-44 mL/min/1 73 square meters)  •  Stage 4 Severe CKD (GFR = 15-29 mL/min/1 73 square meters)  •  Stage 5 End Stage CKD (GFR <15 mL/min/1 73 square meters)  Note: GFR calculation is accurate only with a steady state creatinine    Lipase [[id number]]  (Normal) Collected: 05/31/23 1504    Lab Status: Final result Specimen: Blood from Line, Venous Updated: 05/31/23 1533     Lipase 78 u/L     CBC and differential [[id number]]  (Abnormal) Collected: 05/31/23 1504    Lab Status: Final result Specimen: Blood from Line, Venous Updated: 05/31/23 1515     WBC 9 75 Thousand/uL      RBC 6 16 Million/uL      Hemoglobin 11 6 g/dL      Hematocrit 37 7 %      MCV 61 fL      MCH 18 8 pg      MCHC 30 8 g/dL      RDW 18 6 %      MPV 8 8 fL      Platelets 408 Thousands/uL      nRBC 0 /100 WBCs      Neutrophils Relative 67 %      Immat GRANS % 0 %      Lymphocytes Relative 21 %      Monocytes Relative 8 %      Eosinophils Relative 3 %      Basophils Relative 1 %      Neutrophils Absolute 6 54 Thousands/µL      Immature Grans Absolute 0 02 Thousand/uL      Lymphocytes Absolute 2 04 Thousands/µL      Monocytes Absolute 0 79 Thousand/µL      Eosinophils Absolute 0 25 Thousand/µL      Basophils Absolute 0 11 Thousands/µL                  XR abdomen obstruction series    (Results Pending)              Procedures  Procedures         ED Course                               SBIRT 20yo+    Flowsheet Row Most Recent Value   Initial Alcohol Screen: US AUDIT-C     1   How often do you have a drink containing alcohol? 0 Filed at: 05/31/2023 1342   2  How many drinks containing alcohol do you have on a typical day you are drinking? 0 Filed at: 05/31/2023 1342   3a  Male UNDER 65: How often do you have five or more drinks on one occasion? 0 Filed at: 05/31/2023 1342   3b  FEMALE Any Age, or MALE 65+: How often do you have 4 or more drinks on one occassion? 0 Filed at: 05/31/2023 1342   Audit-C Score 0 Filed at: 05/31/2023 1342   ROD: How many times in the past year have you    Used an illegal drug or used a prescription medication for non-medical reasons? Never Filed at: 05/31/2023 1342                    Medical Decision Making  Has a history of obstruction and ileus as well as food intolerance in the recent past   We will check an obstructive series, hydrate, provide antiemetics and pain relief    Amount and/or Complexity of Data Reviewed  Labs: ordered  Radiology: ordered  Risk  Prescription drug management  Disposition  Final diagnoses:   None     ED Disposition     None      Follow-up Information    None         Patient's Medications   Discharge Prescriptions    No medications on file       No discharge procedures on file      PDMP Review       Value Time User    PDMP Reviewed  Yes 5/18/2023 11:10 AM Mady Mckinley DO          ED Provider  Electronically Signed by           María Tavera MD  05/31/23 9234

## 2023-06-01 ENCOUNTER — PATIENT OUTREACH (OUTPATIENT)
Dept: FAMILY MEDICINE CLINIC | Facility: CLINIC | Age: 28
End: 2023-06-01

## 2023-06-01 NOTE — ASSESSMENT & PLAN NOTE
Not currently on meds Patient came in and would like to know if his script would be sent over to pharmacy today Tremf[place] Counseling: I discussed with the patient the risks of guselkumab including but not limited to immunosuppression, serious infections, and drug reactions.  The patient understands that monitoring is required including a PPD at baseline and must alert us or the primary physician if symptoms of infection or other concerning signs are noted.

## 2023-06-01 NOTE — PROGRESS NOTES
ADT Alert was received via IB  Patient presented to ER on 5/31/23 and was discharged same day  Patient presented with the typical abdominal pain he has due to the ulcerative colitis and complication from past ileal pouch with revisions  Patient is scheduled to have 3rd and final surgery this month in hopes to rectify the ongoing pain

## 2023-06-04 ENCOUNTER — HOSPITAL ENCOUNTER (EMERGENCY)
Facility: HOSPITAL | Age: 28
Discharge: HOME | End: 2023-06-04
Attending: EMERGENCY MEDICINE
Payer: COMMERCIAL

## 2023-06-04 ENCOUNTER — APPOINTMENT (EMERGENCY)
Dept: RADIOLOGY | Facility: HOSPITAL | Age: 28
End: 2023-06-04
Payer: COMMERCIAL

## 2023-06-04 VITALS
HEART RATE: 98 BPM | OXYGEN SATURATION: 98 % | DIASTOLIC BLOOD PRESSURE: 87 MMHG | RESPIRATION RATE: 20 BRPM | WEIGHT: 170 LBS | TEMPERATURE: 98.9 F | SYSTOLIC BLOOD PRESSURE: 138 MMHG | BODY MASS INDEX: 25.1 KG/M2

## 2023-06-04 DIAGNOSIS — R10.9 ABDOMINAL PAIN: Primary | ICD-10-CM

## 2023-06-04 DIAGNOSIS — R14.0 ABDOMINAL DISTENTION: ICD-10-CM

## 2023-06-04 LAB
ALBUMIN SERPL BCP-MCNC: 4.4 G/DL (ref 3.5–5)
ALP SERPL-CCNC: 103 U/L (ref 34–104)
ALT SERPL W P-5'-P-CCNC: 36 U/L (ref 7–52)
ANION GAP SERPL CALCULATED.3IONS-SCNC: 9 MMOL/L (ref 4–13)
AST SERPL W P-5'-P-CCNC: 26 U/L (ref 13–39)
BASOPHILS # BLD AUTO: 0.09 THOUSANDS/ÂΜL (ref 0–0.1)
BASOPHILS NFR BLD AUTO: 1 % (ref 0–1)
BILIRUB SERPL-MCNC: 0.53 MG/DL (ref 0.2–1)
BUN SERPL-MCNC: 16 MG/DL (ref 5–25)
CALCIUM SERPL-MCNC: 9.4 MG/DL (ref 8.4–10.2)
CHLORIDE SERPL-SCNC: 102 MMOL/L (ref 96–108)
CO2 SERPL-SCNC: 29 MMOL/L (ref 21–32)
CREAT SERPL-MCNC: 0.95 MG/DL (ref 0.6–1.3)
EOSINOPHIL # BLD AUTO: 0.36 THOUSAND/ÂΜL (ref 0–0.61)
EOSINOPHIL NFR BLD AUTO: 5 % (ref 0–6)
ERYTHROCYTE [DISTWIDTH] IN BLOOD BY AUTOMATED COUNT: 18.4 % (ref 11.6–15.1)
GFR SERPL CREATININE-BSD FRML MDRD: 106 ML/MIN/1.73SQ M
GLUCOSE SERPL-MCNC: 84 MG/DL (ref 65–140)
HCT VFR BLD AUTO: 35.6 % (ref 36.5–49.3)
HGB BLD-MCNC: 10.8 G/DL (ref 12–17)
IMM GRANULOCYTES # BLD AUTO: 0.03 THOUSAND/UL (ref 0–0.2)
IMM GRANULOCYTES NFR BLD AUTO: 0 % (ref 0–2)
LACTATE SERPL-SCNC: 1 MMOL/L (ref 0.5–2)
LIPASE SERPL-CCNC: 63 U/L (ref 11–82)
LYMPHOCYTES # BLD AUTO: 2.11 THOUSANDS/ÂΜL (ref 0.6–4.47)
LYMPHOCYTES NFR BLD AUTO: 26 % (ref 14–44)
MCH RBC QN AUTO: 19 PG (ref 26.8–34.3)
MCHC RBC AUTO-ENTMCNC: 30.3 G/DL (ref 31.4–37.4)
MCV RBC AUTO: 63 FL (ref 82–98)
MONOCYTES # BLD AUTO: 0.76 THOUSAND/ÂΜL (ref 0.17–1.22)
MONOCYTES NFR BLD AUTO: 9 % (ref 4–12)
NEUTROPHILS # BLD AUTO: 4.7 THOUSANDS/ÂΜL (ref 1.85–7.62)
NEUTS SEG NFR BLD AUTO: 59 % (ref 43–75)
NRBC BLD AUTO-RTO: 0 /100 WBCS
PLATELET # BLD AUTO: 452 THOUSANDS/UL (ref 149–390)
PMV BLD AUTO: 8.4 FL (ref 8.9–12.7)
POTASSIUM SERPL-SCNC: 3.4 MMOL/L (ref 3.5–5.3)
PROT SERPL-MCNC: 7.4 G/DL (ref 6.4–8.4)
RBC # BLD AUTO: 5.68 MILLION/UL (ref 3.88–5.62)
SODIUM SERPL-SCNC: 140 MMOL/L (ref 135–147)
WBC # BLD AUTO: 8.05 THOUSAND/UL (ref 4.31–10.16)

## 2023-06-04 PROCEDURE — 96374 THER/PROPH/DIAG INJ IV PUSH: CPT

## 2023-06-04 PROCEDURE — 96375 TX/PRO/DX INJ NEW DRUG ADDON: CPT

## 2023-06-04 PROCEDURE — 85025 COMPLETE CBC W/AUTO DIFF WBC: CPT | Performed by: EMERGENCY MEDICINE

## 2023-06-04 PROCEDURE — 99285 EMERGENCY DEPT VISIT HI MDM: CPT

## 2023-06-04 PROCEDURE — 96361 HYDRATE IV INFUSION ADD-ON: CPT

## 2023-06-04 PROCEDURE — 80053 COMPREHEN METABOLIC PANEL: CPT | Performed by: EMERGENCY MEDICINE

## 2023-06-04 PROCEDURE — 36415 COLL VENOUS BLD VENIPUNCTURE: CPT | Performed by: EMERGENCY MEDICINE

## 2023-06-04 PROCEDURE — 83690 ASSAY OF LIPASE: CPT | Performed by: EMERGENCY MEDICINE

## 2023-06-04 PROCEDURE — 83605 ASSAY OF LACTIC ACID: CPT | Performed by: EMERGENCY MEDICINE

## 2023-06-04 RX ORDER — KETOROLAC TROMETHAMINE 30 MG/ML
15 INJECTION, SOLUTION INTRAMUSCULAR; INTRAVENOUS ONCE
Status: COMPLETED | OUTPATIENT
Start: 2023-06-04 | End: 2023-06-04

## 2023-06-04 RX ORDER — ONDANSETRON 2 MG/ML
4 INJECTION INTRAMUSCULAR; INTRAVENOUS ONCE
Status: COMPLETED | OUTPATIENT
Start: 2023-06-04 | End: 2023-06-04

## 2023-06-04 RX ADMIN — IOHEXOL 50 ML: 240 INJECTION, SOLUTION INTRATHECAL; INTRAVASCULAR; INTRAVENOUS; ORAL at 18:29

## 2023-06-04 RX ADMIN — KETOROLAC TROMETHAMINE 15 MG: 30 INJECTION, SOLUTION INTRAMUSCULAR at 17:57

## 2023-06-04 RX ADMIN — MORPHINE SULFATE 2 MG: 2 INJECTION, SOLUTION INTRAMUSCULAR; INTRAVENOUS at 18:28

## 2023-06-04 RX ADMIN — ONDANSETRON 4 MG: 2 INJECTION INTRAMUSCULAR; INTRAVENOUS at 17:56

## 2023-06-04 RX ADMIN — SODIUM CHLORIDE 1000 ML: 0.9 INJECTION, SOLUTION INTRAVENOUS at 17:58

## 2023-06-04 NOTE — ED NOTES
"Pt inquiring what provider is on shift and requesting specifically for provider Jo-Ann  Pt made aware that provider is not available and that sign on physician is Oyesanmi  Pt expressed understanding  Pt was then seen at door of room holding onto door and yelling in pain stating \"someone come see me\"  Pt advised that provider will be in shortly to assessm and answer all questions/concerns  Provider Ricarda Potter made aware  Pt father then came to nursing station requesting that pt provider is Ferdous  When this RN informed parent that Ricarda Potter is the assigned physician he stated Aj Yanez is requesting to have Ferdous\"  This RN informed parent that Ricarda Potter is the provider and that all our providers perform the same level of  excellent care  Parent and pt no satisfied with this Rns answer  Charge nurse Atilio Bates made aware        Tricia Bustamante, RN  06/04/23 7996    "

## 2023-06-04 NOTE — ED PROVIDER NOTES
"History  Chief Complaint   Patient presents with   • Abdominal Pain   • Vomiting     Brought by father  Patient lying on floor in bathroom, states did not fall  States having LLQ pain and is very distended and is vomiting  Skin warm and dry     Patient is a 25-year-old male with complaint of sudden onset of abdominal pain/distention and vomiting  Symptoms began after lunch  He localizes the worst of his pain to his epigastrium, radiating to suprapubic area of his abdomen  Patient states that he feels as if he is \"9 months pregnant\"  Patient with decreased output in his ostomy  He has a history of ulcerative colitis, ileal pouchitis, pancreatitis, anxiety, bowel obstruction, C  difficile colitis  Differential diagnosis includes but is not limited to bowel obstruction, colitis, pouchitis, pancreatitis  History provided by:  Patient   used: No    Abdominal Pain  Pain location:  Generalized  Pain quality: aching    Pain radiates to:  Back  Pain severity:  Moderate  Onset quality:  Sudden  Timing:  Constant  Chronicity:  New  Relieved by:  Nothing  Worsened by:  Nothing  Ineffective treatments:  None tried  Associated symptoms: nausea and vomiting    Associated symptoms: no chest pain, no chills, no constipation, no cough, no diarrhea, no dysuria, no fever, no hematuria and no shortness of breath    Vomiting  Associated symptoms: abdominal pain    Associated symptoms: no chills, no cough, no diarrhea and no fever        Prior to Admission Medications   Prescriptions Last Dose Informant Patient Reported? Taking?    ALPRAZolam (XANAX) 0 5 mg tablet  Self No No   Si-2 po q8hrs prn anxiety, avoid use with opioids   HYDROmorphone (DILAUDID) 2 mg tablet  Self Yes No   Patient not taking: Reported on 2023   acetaminophen (TYLENOL) 325 mg tablet  Self No No   Sig: Take 2 tablets (650 mg total) by mouth every 6 (six) hours as needed for mild pain, headaches or fever " diphenoxylate-atropine (LOMOTIL) 2 5-0 025 mg per tablet  Self Yes No   Sig: Take 2 tablets by mouth   Patient not taking: Reported on 5/18/2023   loperamide (IMODIUM) 2 mg capsule  Self No No   Sig: Take 1 capsule (2 mg total) by mouth 3 (three) times a day before meals Do not start before March 16, 2023  temazepam (RESTORIL) 7 5 mg capsule   No No   Sig: Take 1 capsule (7 5 mg total) by mouth daily at bedtime as needed for sleep Avoid use with xanax and opioids      Facility-Administered Medications: None       Past Medical History:   Diagnosis Date   • Ankylosing spondylitis (HCC)    • Anxiety    • Bowel obstruction (HCC)    • Clostridium difficile colitis 9/13/2018   • Colitis    • 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[facility] ) 9/13/2018   • Pancreatitis    • Ulcerative colitis ([facility] )        Past Surgical History:   Procedure Laterality Date   • COLECTOMY TOTAL      with ileal pouch and anastemosis   • IR PICC PLACEMENT SINGLE LUMEN  3/1/2022   • TOTAL COLECTOMY         Family History   Problem Relation Age of Onset   • Lung disease Neg Hx      I have reviewed and agree with the history as documented  E-Cigarette/Vaping   • E-Cigarette Use Never User      E-Cigarette/Vaping Substances   • Nicotine No    • THC No    • CBD No    • Flavoring No    • Other No    • Unknown No      Social History     Tobacco Use   • Smoking status: Never   • Smokeless tobacco: Never   Vaping Use   • Vaping Use: Never used   Substance Use Topics   • Alcohol use: Not Currently     Comment: pt states 5 a month/socially   • Drug use: No       Review of Systems   Constitutional: Negative for chills and fever  Respiratory: Negative for cough, shortness of breath and wheezing  Cardiovascular: Negative for chest pain and palpitations  Gastrointestinal: Positive for abdominal pain, nausea and vomiting  Negative for constipation and diarrhea  Genitourinary: Negative for dysuria, flank pain, hematuria and urgency     Musculoskeletal: Negative for back pain    Skin: Negative for color change and rash  All other systems reviewed and are negative  Physical Exam  Physical Exam  Vitals and nursing note reviewed  Constitutional:       Appearance: He is well-developed  HENT:      Head: Normocephalic and atraumatic  Eyes:      Pupils: Pupils are equal, round, and reactive to light  Cardiovascular:      Rate and Rhythm: Normal rate and regular rhythm  Heart sounds: Normal heart sounds  Pulmonary:      Effort: Pulmonary effort is normal       Breath sounds: Normal breath sounds  Abdominal:      General: Bowel sounds are normal  There is no distension  Palpations: Abdomen is soft  There is no mass  Tenderness: There is generalized abdominal tenderness  There is no guarding or rebound  Skin:     General: Skin is warm and dry  Capillary Refill: Capillary refill takes less than 2 seconds  Neurological:      General: No focal deficit present  Mental Status: He is alert and oriented to person, place, and time  Psychiatric:         Behavior: Behavior normal          Thought Content:  Thought content normal          Judgment: Judgment normal          Vital Signs  ED Triage Vitals [06/04/23 1642]   Temperature Pulse Respirations Blood Pressure SpO2   98 9 °F (37 2 °C) (!) 118 20 137/73 97 %      Temp Source Heart Rate Source Patient Position - Orthostatic VS BP Location FiO2 (%)   Tympanic Monitor Sitting Left arm --      Pain Score       8           Vitals:    06/04/23 1642 06/04/23 1854   BP: 137/73 138/87   Pulse: (!) 118 98   Patient Position - Orthostatic VS: Sitting Lying         Visual Acuity      ED Medications  Medications   ondansetron (ZOFRAN) injection 4 mg (4 mg Intravenous Given 6/4/23 1756)   sodium chloride 0 9 % bolus 1,000 mL (0 mL Intravenous Stopped 6/4/23 1858)   ketorolac (TORADOL) injection 15 mg (15 mg Intravenous Given 6/4/23 1757)   iohexol (OMNIPAQUE) 240 MG/ML solution 50 mL (50 mL Oral Given 6/4/23 1829) morphine injection 2 mg (2 mg Intravenous Given 6/4/23 1828)       Diagnostic Studies  Results Reviewed     Procedure Component Value Units Date/Time    Comprehensive metabolic panel [[id number]]  (Abnormal) Collected: 06/04/23 1800    Lab Status: Final result Specimen: Blood from Arm, Right Updated: 06/04/23 1837     Sodium 140 mmol/L      Potassium 3 4 mmol/L      Chloride 102 mmol/L      CO2 29 mmol/L      ANION GAP 9 mmol/L      BUN 16 mg/dL      Creatinine 0 95 mg/dL      Glucose 84 mg/dL      Calcium 9 4 mg/dL      AST 26 U/L      ALT 36 U/L      Alkaline Phosphatase 103 U/L      Total Protein 7 4 g/dL      Albumin 4 4 g/dL      Total Bilirubin 0 53 mg/dL      eGFR 106 ml/min/1 73sq m     Narrative:      [place] guidelines for Chronic Kidney Disease (CKD):   •  Stage 1 with normal or high GFR (GFR > 90 mL/min/1 73 square meters)  •  Stage 2 Mild CKD (GFR = 60-89 mL/min/1 73 square meters)  •  Stage 3A Moderate CKD (GFR = 45-59 mL/min/1 73 square meters)  •  Stage 3B Moderate CKD (GFR = 30-44 mL/min/1 73 square meters)  •  Stage 4 Severe CKD (GFR = 15-29 mL/min/1 73 square meters)  •  Stage 5 End Stage CKD (GFR <15 mL/min/1 73 square meters)  Note: GFR calculation is accurate only with a steady state creatinine    Lipase [[id number]]  (Normal) Collected: 06/04/23 1800    Lab Status: Final result Specimen: Blood from Arm, Right Updated: 06/04/23 1837     Lipase 63 u/L     Lactic acid, plasma (w/reflex if result > 2 0) [[id number]]  (Normal) Collected: 06/04/23 1800    Lab Status: Final result Specimen: Blood from Arm, Right Updated: 06/04/23 1820     LACTIC ACID 1 0 mmol/L     Narrative:      Result may be elevated if tourniquet was used during collection      CBC and differential [[id number]]  (Abnormal) Collected: 06/04/23 1800    Lab Status: Final result Specimen: Blood from Arm, Right Updated: 06/04/23 1806     WBC 8 05 Thousand/uL      RBC 5 68 Million/uL      Hemoglobin 10 8 g/dL Hematocrit 35 6 %      MCV 63 fL      MCH 19 0 pg      MCHC 30 3 g/dL      RDW 18 4 %      MPV 8 4 fL      Platelets 324 Thousands/uL      nRBC 0 /100 WBCs      Neutrophils Relative 59 %      Immat GRANS % 0 %      Lymphocytes Relative 26 %      Monocytes Relative 9 %      Eosinophils Relative 5 %      Basophils Relative 1 %      Neutrophils Absolute 4 70 Thousands/µL      Immature Grans Absolute 0 03 Thousand/uL      Lymphocytes Absolute 2 11 Thousands/µL      Monocytes Absolute 0 76 Thousand/µL      Eosinophils Absolute 0 36 Thousand/µL      Basophils Absolute 0 09 Thousands/µL                  No orders to display              Procedures  Procedures         ED Course  ED Course as of 06/08/23 1623   [place] Jun 04, 2023 2006 Patient refusing CT scan after drinking half of his contrast   He states that he is colostomy bag now has output, abdominal distention is improved and pain has improved  Patient prefers to be discharged at this time  Patient will be discharged [address], CT was ordered to rule out obstruction or other acute intra-abdominal pathology  SBIRT 22yo+    Flowsheet Row Most Recent Value   Initial Alcohol Screen: US AUDIT-C     1  How often do you have a drink containing alcohol? 0 Filed at: 06/04/2023 1644   Audit-C Score 0 Filed at: 06/04/2023 1644   ROD: How many times in the past year have you    Used an illegal drug or used a prescription medication for non-medical reasons? Never Filed at: 06/04/2023 [address]  and CT ordered  Patient scheduled for CT and drinking oral prep in anticipation of CT abdomen pelvis  Patient requesting stronger pain meds  Toradol initially administered  Patient treated with morphine  After drinking half of oral prep, patient states that he is having output in his ostomy and is refusing CT    Patient will be discharged [address], as I reviewed concerns for acute surgical pathology with him  Patient given return precautions  He states he is pain-free at the time of discharge    Amount and/or Complexity of Data Reviewed  Labs: ordered  Risk  Prescription drug management  Disposition  Final diagnoses:   Abdominal pain   Abdominal distention     Time reflects when diagnosis was documented in both MDM as applicable and the Disposition within this note     Time User Action Codes Description Comment    6/4/2023  8:08 PM [place] O Add [R10 9] Abdominal pain     6/4/2023  8:08 PM [place] Add [R14 0] Abdominal distention       ED Disposition     ED Disposition   AMA    Condition   --    Date/Time   Sun Jun 4, 2023  8:08 PM    Comment   Date: 6/4/2023  Patient: Veronica Rosenberg  Admitted: 6/4/2023  5:14 PM  Attending Provider: Latrell Pereira DO    Veronica Rosenberg or his authorized caregiver has made the decision for the patient to leave the emergency department against the adv ice of his attending physician  He or his authorized caregiver has been informed and understands the inherent risks, including death, decompensation, disability  He or his authorized caregiver has decided to accept the responsibility for this decisi on  Veronica Rosenberg and all necessary parties have been advised that he may return for further evaluation or treatment  His condition at time of discharge was improved    Veronica Rosenberg had current vital signs as follows:  /87 (BP Location: Lef t arm)   Pulse 98   Temp 98 9 °F (37 2 °C) (Tympanic)   Resp 20   Wt 77 1 kg (170 lb)            Follow-up Information     Follow up With Specialties Details Why Contact Info    Eben Santamaria DO Family Medicine Schedule an appointment as soon as possible for a visit in 1 day for follow up [address]  [phone]            Discharge Medication List as of 6/4/2023  8:09 PM      CONTINUE these medications which have NOT CHANGED    Details   acetaminophen (TYLENOL) 325 mg tablet Take 2 tablets (650 mg total) by mouth every 6 (six) hours as needed for mild pain, headaches or fever, Starting Tue 1/24/2023, No Print      ALPRAZolam (XANAX) 0 5 mg tablet 1-2 po q8hrs prn anxiety, avoid use with opioids, Normal      diphenoxylate-atropine (LOMOTIL) 2 5-0 025 mg per tablet Take 2 tablets by mouth, Starting Mon 2/20/2023, Historical Med      HYDROmorphone (DILAUDID) 2 mg tablet Starting Mon 5/1/2023, Historical Med      loperamide (IMODIUM) 2 mg capsule Take 1 capsule (2 mg total) by mouth 3 (three) times a day before meals Do not start before March 16, 2023 , Starting u 3/16/2023, Normal      temazepam (RESTORIL) 7 5 mg capsule Take 1 capsule (7 5 mg total) by mouth daily at bedtime as needed for sleep Avoid use with xanax and opioids, Starting Thu 5/18/2023, Normal             No discharge procedures on file      PDMP Review       Value Time User    PDMP Reviewed  Yes 5/18/2023 11:10 AM Gisela Jones DO          ED Provider  Electronically Signed by           Alida Jj DO  06/08/23 3315

## 2023-06-04 NOTE — ED NOTES
"Patient will not sit in whch in waiting room , states is \" degrading \" and walks back to bathroom, father here     Fan Armas, ZACHARY  06/04/23 9630    "

## 2023-06-16 ENCOUNTER — PATIENT OUTREACH (OUTPATIENT)
Dept: FAMILY MEDICINE CLINIC | Facility: CLINIC | Age: 28
End: 2023-06-16

## 2023-06-16 NOTE — PROGRESS NOTES
ADT Alert received  Patient presented to ER on 6/4/23  Chart reviewed  Patient presents with same c/o  as on multiple previous ER visits , with abdominal pain nausea and vomiting  Patient is continuing with surgeon in [facility]  This RNCM attempted to call patient and there was no answer and VM was full-unable to leave a message  RNCM to follow

## 2023-06-19 ENCOUNTER — PATIENT OUTREACH (OUTPATIENT)
Dept: FAMILY MEDICINE CLINIC | Facility: CLINIC | Age: 28
End: 2023-06-19

## 2023-06-19 NOTE — LETTER
Date: 06/19/23    Dear Kirkwood Dancer,   My name is Bekah Villavicencio; I am a registered nurse care manager working with [facility]  I have not been able to reach you and would like to set a time that I can talk with you over the phone  My work is to help patients that have complex medical conditions get the care they need  This includes patients who may have been in the hospital or emergency room  Please call me with any questions you may have  I look forward to speaking with you    Sincerely,  Pat [facility]  [phone]  Outpatient Care Manager

## 2023-06-19 NOTE — PROGRESS NOTES
Chart reviewed  This RNCM attempted to call patient  There was no answer and VM was full and unable to leave a message  Outreach #2 and UTR letter sent to patient via [address]      CCM episode closed and this RNCM removed self from care team

## 2023-06-23 ENCOUNTER — HOSPITAL ENCOUNTER (OUTPATIENT)
Facility: HOSPITAL | Age: 28
Setting detail: OBSERVATION
Discharge: HOME | End: 2023-06-23
Attending: EMERGENCY MEDICINE | Admitting: INTERNAL MEDICINE
Payer: COMMERCIAL

## 2023-06-23 ENCOUNTER — APPOINTMENT (EMERGENCY)
Dept: RADIOLOGY | Facility: HOSPITAL | Age: 28
End: 2023-06-23
Payer: COMMERCIAL

## 2023-06-23 VITALS
OXYGEN SATURATION: 98 % | SYSTOLIC BLOOD PRESSURE: 140 MMHG | HEIGHT: 69 IN | DIASTOLIC BLOOD PRESSURE: 99 MMHG | WEIGHT: 170 LBS | RESPIRATION RATE: 14 BRPM | HEART RATE: 102 BPM | BODY MASS INDEX: 25.18 KG/M2 | TEMPERATURE: 99.2 F

## 2023-06-23 DIAGNOSIS — R10.9 ABDOMINAL PAIN: Primary | ICD-10-CM

## 2023-06-23 LAB
ALBUMIN SERPL BCP-MCNC: 4.8 G/DL (ref 3.5–5)
ALP SERPL-CCNC: 109 U/L (ref 34–104)
ALT SERPL W P-5'-P-CCNC: 35 U/L (ref 7–52)
ANION GAP SERPL CALCULATED.3IONS-SCNC: 9 MMOL/L
AST SERPL W P-5'-P-CCNC: 22 U/L (ref 13–39)
BASOPHILS # BLD AUTO: 0.15 THOUSANDS/ÂΜL (ref 0–0.1)
BASOPHILS NFR BLD AUTO: 2 % (ref 0–1)
BILIRUB SERPL-MCNC: 0.51 MG/DL (ref 0.2–1)
BUN SERPL-MCNC: 13 MG/DL (ref 5–25)
CALCIUM SERPL-MCNC: 9.8 MG/DL (ref 8.4–10.2)
CHLORIDE SERPL-SCNC: 100 MMOL/L (ref 96–108)
CO2 SERPL-SCNC: 26 MMOL/L (ref 21–32)
CREAT SERPL-MCNC: 0.95 MG/DL (ref 0.6–1.3)
EOSINOPHIL # BLD AUTO: 0.23 THOUSAND/ÂΜL (ref 0–0.61)
EOSINOPHIL NFR BLD AUTO: 2 % (ref 0–6)
ERYTHROCYTE [DISTWIDTH] IN BLOOD BY AUTOMATED COUNT: 18.1 % (ref 11.6–15.1)
GFR SERPL CREATININE-BSD FRML MDRD: 106 ML/MIN/1.73SQ M
GLUCOSE SERPL-MCNC: 124 MG/DL (ref 65–140)
HCT VFR BLD AUTO: 39.5 % (ref 36.5–49.3)
HGB BLD-MCNC: 12 G/DL (ref 12–17)
IMM GRANULOCYTES # BLD AUTO: 0.04 THOUSAND/UL (ref 0–0.2)
IMM GRANULOCYTES NFR BLD AUTO: 0 % (ref 0–2)
LIPASE SERPL-CCNC: 40 U/L (ref 11–82)
LYMPHOCYTES # BLD AUTO: 2.38 THOUSANDS/ÂΜL (ref 0.6–4.47)
LYMPHOCYTES NFR BLD AUTO: 23 % (ref 14–44)
MCH RBC QN AUTO: 19 PG (ref 26.8–34.3)
MCHC RBC AUTO-ENTMCNC: 30.4 G/DL (ref 31.4–37.4)
MCV RBC AUTO: 62 FL (ref 82–98)
MONOCYTES # BLD AUTO: 0.71 THOUSAND/ÂΜL (ref 0.17–1.22)
MONOCYTES NFR BLD AUTO: 7 % (ref 4–12)
NEUTROPHILS # BLD AUTO: 6.8 THOUSANDS/ÂΜL (ref 1.85–7.62)
NEUTS SEG NFR BLD AUTO: 66 % (ref 43–75)
NRBC BLD AUTO-RTO: 0 /100 WBCS
PLATELET # BLD AUTO: 517 THOUSANDS/UL (ref 149–390)
PMV BLD AUTO: 8.8 FL (ref 8.9–12.7)
POTASSIUM SERPL-SCNC: 3.5 MMOL/L (ref 3.5–5.3)
PROT SERPL-MCNC: 8 G/DL (ref 6.4–8.4)
RBC # BLD AUTO: 6.33 MILLION/UL (ref 3.88–5.62)
SODIUM SERPL-SCNC: 135 MMOL/L (ref 135–147)
WBC # BLD AUTO: 10.31 THOUSAND/UL (ref 4.31–10.16)

## 2023-06-23 PROCEDURE — 96361 HYDRATE IV INFUSION ADD-ON: CPT

## 2023-06-23 PROCEDURE — 93005 ELECTROCARDIOGRAM TRACING: CPT

## 2023-06-23 PROCEDURE — 96374 THER/PROPH/DIAG INJ IV PUSH: CPT

## 2023-06-23 PROCEDURE — 99285 EMERGENCY DEPT VISIT HI MDM: CPT

## 2023-06-23 PROCEDURE — 96376 TX/PRO/DX INJ SAME DRUG ADON: CPT

## 2023-06-23 PROCEDURE — G1004 CDSM NDSC: HCPCS

## 2023-06-23 PROCEDURE — 36415 COLL VENOUS BLD VENIPUNCTURE: CPT | Performed by: EMERGENCY MEDICINE

## 2023-06-23 PROCEDURE — 83690 ASSAY OF LIPASE: CPT | Performed by: EMERGENCY MEDICINE

## 2023-06-23 PROCEDURE — 74177 CT ABD & PELVIS W/CONTRAST: CPT

## 2023-06-23 PROCEDURE — 96375 TX/PRO/DX INJ NEW DRUG ADDON: CPT

## 2023-06-23 PROCEDURE — 85025 COMPLETE CBC W/AUTO DIFF WBC: CPT | Performed by: EMERGENCY MEDICINE

## 2023-06-23 PROCEDURE — 80053 COMPREHEN METABOLIC PANEL: CPT | Performed by: EMERGENCY MEDICINE

## 2023-06-23 RX ORDER — ONDANSETRON 2 MG/ML
4 INJECTION INTRAMUSCULAR; INTRAVENOUS ONCE
Status: COMPLETED | OUTPATIENT
Start: 2023-06-23 | End: 2023-06-23

## 2023-06-23 RX ORDER — LORAZEPAM 2 MG/ML
1 INJECTION INTRAMUSCULAR ONCE
Status: COMPLETED | OUTPATIENT
Start: 2023-06-23 | End: 2023-06-23

## 2023-06-23 RX ORDER — HYDROMORPHONE HYDROCHLORIDE 2 MG/ML
2 INJECTION, SOLUTION INTRAMUSCULAR; INTRAVENOUS; SUBCUTANEOUS ONCE
Status: COMPLETED | OUTPATIENT
Start: 2023-06-23 | End: 2023-06-23

## 2023-06-23 RX ORDER — METOCLOPRAMIDE HYDROCHLORIDE 5 MG/ML
10 INJECTION INTRAMUSCULAR; INTRAVENOUS ONCE
Status: COMPLETED | OUTPATIENT
Start: 2023-06-23 | End: 2023-06-23

## 2023-06-23 RX ORDER — HYDROMORPHONE HCL/PF 1 MG/ML
1 SYRINGE (ML) INJECTION ONCE
Status: COMPLETED | OUTPATIENT
Start: 2023-06-23 | End: 2023-06-23

## 2023-06-23 RX ADMIN — LORAZEPAM 1 MG: 2 INJECTION, SOLUTION INTRAMUSCULAR; INTRAVENOUS at 20:12

## 2023-06-23 RX ADMIN — HYDROMORPHONE HYDROCHLORIDE 2 MG: 2 INJECTION, SOLUTION INTRAMUSCULAR; INTRAVENOUS; SUBCUTANEOUS at 17:41

## 2023-06-23 RX ADMIN — SODIUM CHLORIDE 1000 ML: 0.9 INJECTION, SOLUTION INTRAVENOUS at 14:51

## 2023-06-23 RX ADMIN — ONDANSETRON 4 MG: 2 INJECTION INTRAMUSCULAR; INTRAVENOUS at 14:50

## 2023-06-23 RX ADMIN — IOHEXOL 50 ML: 240 INJECTION, SOLUTION INTRATHECAL; INTRAVASCULAR; INTRAVENOUS; ORAL at 15:27

## 2023-06-23 RX ADMIN — IOHEXOL 100 ML: 350 INJECTION, SOLUTION INTRAVENOUS at 17:27

## 2023-06-23 RX ADMIN — LORAZEPAM 1 MG: 2 INJECTION, SOLUTION INTRAMUSCULAR; INTRAVENOUS at 14:50

## 2023-06-23 RX ADMIN — HYDROMORPHONE HYDROCHLORIDE 2 MG: 2 INJECTION, SOLUTION INTRAMUSCULAR; INTRAVENOUS; SUBCUTANEOUS at 14:50

## 2023-06-23 RX ADMIN — ONDANSETRON 4 MG: 2 INJECTION INTRAMUSCULAR; INTRAVENOUS at 17:46

## 2023-06-23 RX ADMIN — SODIUM CHLORIDE 1000 ML: 0.9 INJECTION, SOLUTION INTRAVENOUS at 20:12

## 2023-06-23 RX ADMIN — METOCLOPRAMIDE 10 MG: 5 INJECTION, SOLUTION INTRAMUSCULAR; INTRAVENOUS at 20:11

## 2023-06-23 RX ADMIN — HYDROMORPHONE HYDROCHLORIDE 1 MG: 1 INJECTION, SOLUTION INTRAMUSCULAR; INTRAVENOUS; SUBCUTANEOUS at 20:10

## 2023-06-23 NOTE — Clinical Note
Case was discussed with hospitalist and the patient's admission status was agreed to be Admission Status: observation status to the service of Dr Pilo Menon

## 2023-06-23 NOTE — ED PROVIDER NOTES
History  Chief Complaint   Patient presents with   • Abdominal Pain     Patient arrives ambulatory to ED, AO x 4 with c/o sudden onset RUQ pain about 30 minutes PTA  Denies N/V at this time  Patient has a complicated history of ulcerative colitis status post total proctocolectomy with ileal J-pouch formation, subsequent revisions, history of recurrent small bowel obstruction  He states he has been recently well tolerating foods  He had a retrograde enteric contrast study performed by the surgical team which was remarkable for a incomplete stricture of ileum near the old J-pouch which was not obstructive  Patient started having pain in the right upper quadrant which is unusual for him, starting yesterday after a meal which resolved  Pain returned again today approximately half hour prior to arrival again after meal   Patient felt distended and bloated nauseous with crampy abdominal pain  He arrives awake and alert, in pain and quite anxious, diaphoretic  Prior to Admission Medications   Prescriptions Last Dose Informant Patient Reported? Taking? ALPRAZolam (XANAX) 0 5 mg tablet  Self No No   Si-2 po q8hrs prn anxiety, avoid use with opioids   HYDROmorphone (DILAUDID) 2 mg tablet  Self Yes No   Patient not taking: Reported on 2023   acetaminophen (TYLENOL) 325 mg tablet  Self No No   Sig: Take 2 tablets (650 mg total) by mouth every 6 (six) hours as needed for mild pain, headaches or fever   diphenoxylate-atropine (LOMOTIL) 2 5-0 025 mg per tablet  Self Yes No   Sig: Take 2 tablets by mouth   Patient not taking: Reported on 2023   loperamide (IMODIUM) 2 mg capsule  Self No No   Sig: Take 1 capsule (2 mg total) by mouth 3 (three) times a day before meals Do not start before 2023     temazepam (RESTORIL) 7 5 mg capsule   No No   Sig: Take 1 capsule (7 5 mg total) by mouth daily at bedtime as needed for sleep Avoid use with xanax and opioids      Facility-Administered Medications: None       Past Medical History:   Diagnosis Date   • Ankylosing spondylitis ([facility] )    • Anxiety    • Bowel obstruction ([facility])    • Clostridium difficile colitis 9/13/2018   • Colitis    • 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[facility] ) 9/13/2018   • Pancreatitis    • Ulcerative colitis ([facility] )        Past Surgical History:   Procedure Laterality Date   • COLECTOMY TOTAL      with ileal pouch and anastemosis   • IR PICC PLACEMENT SINGLE LUMEN  3/1/2022   • TOTAL COLECTOMY         Family History   Problem Relation Age of Onset   • Lung disease Neg Hx      I have reviewed and agree with the history as documented  E-Cigarette/Vaping   • E-Cigarette Use Never User      E-Cigarette/Vaping Substances   • Nicotine No    • THC No    • CBD No    • Flavoring No    • Other No    • Unknown No      Social History     Tobacco Use   • Smoking status: Never   • Smokeless tobacco: Never   Vaping Use   • Vaping Use: Never used   Substance Use Topics   • Alcohol use: Not Currently     Comment: pt states 5 a month/socially   • Drug use: No       Review of Systems   Constitutional: Positive for diaphoresis  Negative for appetite change, chills and fever  HENT: Negative for congestion and sore throat  Eyes: Negative for visual disturbance  Respiratory: Negative for cough and shortness of breath  Cardiovascular: Negative for chest pain  Gastrointestinal: Positive for abdominal distention, abdominal pain and nausea  Genitourinary: Negative for difficulty urinating and dysuria  Musculoskeletal: Negative for back pain  Skin: Negative for color change and rash  Neurological: Negative for weakness and headaches  Hematological: Does not bruise/bleed easily  Psychiatric/Behavioral: Negative for confusion  The patient is nervous/anxious  All other systems reviewed and are negative  Physical Exam  Physical Exam  Vitals and nursing note reviewed  Constitutional:       Appearance: He is well-developed     HENT:      Head: Normocephalic  Mouth/Throat:      Mouth: Mucous membranes are moist    Eyes:      Extraocular Movements: Extraocular movements intact  Cardiovascular:      Rate and Rhythm: Regular rhythm  Tachycardia present  Heart sounds: Normal heart sounds  Pulmonary:      Effort: Pulmonary effort is normal    Abdominal:      General: Abdomen is flat  Bowel sounds are normal       Palpations: Abdomen is soft  Tenderness: There is abdominal tenderness in the right upper quadrant and epigastric area  Skin:     General: Skin is warm and dry  Capillary Refill: Capillary refill takes less than 2 seconds  Neurological:      General: No focal deficit present  Mental Status: He is alert and oriented to person, place, and time  Psychiatric:         Mood and Affect: Mood is anxious           Behavior: Behavior normal          Vital Signs  ED Triage Vitals [06/23/23 1431]   Temperature Pulse Respirations Blood Pressure SpO2   99 2 °F (37 3 °C) (!) 131 22 (!) 152/117 98 %      Temp Source Heart Rate Source Patient Position - Orthostatic VS BP Location FiO2 (%)   Tympanic Monitor Sitting Left arm --      Pain Score       10 - Worst Possible Pain           Vitals:    06/23/23 1431 06/23/23 1528 06/23/23 1530 06/23/23 1745   BP: (!) 152/117 140/99 140/99    Pulse: (!) 131 104 103 102   Patient Position - Orthostatic VS: Sitting            Visual Acuity      ED Medications  Medications   HYDROmorphone (DILAUDID) injection 1 mg (has no administration in time range)   LORazepam (ATIVAN) injection 1 mg (has no administration in time range)   metoclopramide (REGLAN) injection 10 mg (has no administration in time range)   sodium chloride 0 9 % bolus 1,000 mL (has no administration in time range)   sodium chloride 0 9 % bolus 1,000 mL (0 mL Intravenous Stopped 6/23/23 1551)   HYDROmorphone (DILAUDID) injection 2 mg (2 mg Intravenous Given 6/23/23 1450)   ondansetron (ZOFRAN) injection 4 mg (4 mg Intravenous Given 6/23/23 1450)   LORazepam (ATIVAN) injection 1 mg (1 mg Intravenous Given 6/23/23 1450)   iohexol (OMNIPAQUE) 240 MG/ML solution 50 mL (50 mL Oral Given 6/23/23 1527)   ondansetron (ZOFRAN) injection 4 mg (4 mg Intravenous Given 6/23/23 1746)   HYDROmorphone (DILAUDID) injection 2 mg (2 mg Intravenous Given 6/23/23 1741)   iohexol (OMNIPAQUE) 350 MG/ML injection (SINGLE-DOSE) 100 mL (100 mL Intravenous Given 6/23/23 1727)       Diagnostic Studies  Results Reviewed     Procedure Component Value Units Date/Time    Comprehensive metabolic panel [[id number]]  (Abnormal) Collected: 06/23/23 1458    Lab Status: Final result Specimen: Blood from Line, Venous Updated: 06/23/23 1524     Sodium 135 mmol/L      Potassium 3 5 mmol/L      Chloride 100 mmol/L      CO2 26 mmol/L      ANION GAP 9 mmol/L      BUN 13 mg/dL      Creatinine 0 95 mg/dL      Glucose 124 mg/dL      Calcium 9 8 mg/dL      AST 22 U/L      ALT 35 U/L      Alkaline Phosphatase 109 U/L      Total Protein 8 0 g/dL      Albumin 4 8 g/dL      Total Bilirubin 0 51 mg/dL      eGFR 106 ml/min/1 73sq m     Narrative:      [place] guidelines for Chronic Kidney Disease (CKD):   •  Stage 1 with normal or high GFR (GFR > 90 mL/min/1 73 square meters)  •  Stage 2 Mild CKD (GFR = 60-89 mL/min/1 73 square meters)  •  Stage 3A Moderate CKD (GFR = 45-59 mL/min/1 73 square meters)  •  Stage 3B Moderate CKD (GFR = 30-44 mL/min/1 73 square meters)  •  Stage 4 Severe CKD (GFR = 15-29 mL/min/1 73 square meters)  •  Stage 5 End Stage CKD (GFR <15 mL/min/1 73 square meters)  Note: GFR calculation is accurate only with a steady state creatinine    Lipase [[id number]]  (Normal) Collected: 06/23/23 1458    Lab Status: Final result Specimen: Blood from Line, Venous Updated: 06/23/23 1524     Lipase 40 u/L     CBC and differential [[id number]]  (Abnormal) Collected: 06/23/23 1458    Lab Status: Final result Specimen: Blood from Line, Venous Updated: 06/23/23 4365 WBC 10 31 Thousand/uL      RBC 6 33 Million/uL      Hemoglobin 12 0 g/dL      Hematocrit 39 5 %      MCV 62 fL      MCH 19 0 pg      MCHC 30 4 g/dL      RDW 18 1 %      MPV 8 8 fL      Platelets 434 Thousands/uL      nRBC 0 /100 WBCs      Neutrophils Relative 66 %      Immat GRANS % 0 %      Lymphocytes Relative 23 %      Monocytes Relative 7 %      Eosinophils Relative 2 %      Basophils Relative 2 %      Neutrophils Absolute 6 80 Thousands/µL      Immature Grans Absolute 0 04 Thousand/uL      Lymphocytes Absolute 2 38 Thousands/µL      Monocytes Absolute 0 71 Thousand/µL      Eosinophils Absolute 0 23 Thousand/µL      Basophils Absolute 0 15 Thousands/µL                  CT abdomen pelvis with contrast   Final Result by Macy Michel MD (06/23 1840)      Right lower quadrant ileostomy  The contrast column extends through the ileostomy into the ileostomy bag  Reidentified J-pouch with mild chronic mucosal thickening  No acute inflammatory changes  Workstation performed: [id number]                    Procedures  ECG 12 Lead Documentation Only    Date/Time: 6/23/2023 7:28 PM    Performed by: Eduardo Franco MD  Authorized by: Eduardo Franco MD    Indications / Diagnosis:  Abdominal pain  ECG reviewed by me, the ED Provider: yes    Patient location:  ED  Interpretation:     Interpretation: normal    Rate:     ECG rate:  135    ECG rate assessment: tachycardic    Rhythm:     Rhythm: sinus tachycardia    Ectopy:     Ectopy: none    QRS:     QRS axis:  Normal    QRS intervals:  Normal  Conduction:     Conduction: normal    ST segments:     ST segments:  Normal  T waves:     T waves: normal               ED Course                                             Medical Decision Making  Patient explains this presentation is different than prior and he is more concerned for possible obstruction at this time    We will CT scan the abdomen    Amount and/or Complexity of Data Reviewed  Labs: ordered  Radiology: ordered  Risk  Prescription drug management  Decision regarding hospitalization  Disposition  Final diagnoses:   Abdominal pain     Time reflects when diagnosis was documented in both MDM as applicable and the Disposition within this note     Time User Action Codes Description Comment    6/23/2023  7:49 PM Kim Cummings Add [R10 9] Abdominal pain       ED Disposition     ED Disposition   Admit    Condition   Stable    Date/Time   Fri Jun 23, 2023  7:49 PM    Comment   Case was discussed with hospitalist and the patient's admission status was agreed to be Admission Status: observation status to the service of Dr Esteban Nice   Follow-up Information    None         Patient's Medications   Discharge Prescriptions    No medications on file       No discharge procedures on file      PDMP Review       Value Time User    PDMP Reviewed  Yes 5/18/2023 11:10 AM Mitali Jane DO          ED Provider  Electronically Signed by           Rita Corona MD  06/23/23 9255

## 2023-06-24 NOTE — QUICK NOTE
ER team called for admission on patient for intractable abdominal pain  Labs as well as CT imaging unremarkable for acute abnormality  Has hx of IBD  Before I was able to come down to the ER to see patient, he was now requesting to go home to ER staff now and reported feeling better  As such patient was discharged by ER team to home and they informed me of this  I was unable to physically see patient

## 2023-06-26 LAB
ATRIAL RATE: 135 BPM
P AXIS: 60 DEGREES
PR INTERVAL: 158 MS
QRS AXIS: -10 DEGREES
QRSD INTERVAL: 84 MS
QT INTERVAL: 272 MS
QTC INTERVAL: 408 MS
T WAVE AXIS: 62 DEGREES
VENTRICULAR RATE: 135 BPM

## 2023-06-26 PROCEDURE — 93010 ELECTROCARDIOGRAM REPORT: CPT | Performed by: INTERNAL MEDICINE

## 2023-07-13 ENCOUNTER — APPOINTMENT (EMERGENCY)
Dept: RADIOLOGY | Facility: HOSPITAL | Age: 28
End: 2023-07-13
Payer: COMMERCIAL

## 2023-07-13 ENCOUNTER — HOSPITAL ENCOUNTER (EMERGENCY)
Facility: HOSPITAL | Age: 28
Discharge: HOME | End: 2023-07-13
Attending: STUDENT IN AN ORGANIZED HEALTH CARE EDUCATION/TRAINING PROGRAM
Payer: COMMERCIAL

## 2023-07-13 VITALS
SYSTOLIC BLOOD PRESSURE: 150 MMHG | OXYGEN SATURATION: 97 % | TEMPERATURE: 99.3 F | HEART RATE: 98 BPM | DIASTOLIC BLOOD PRESSURE: 67 MMHG | RESPIRATION RATE: 18 BRPM

## 2023-07-13 DIAGNOSIS — R10.9 ABDOMINAL PAIN: Primary | ICD-10-CM

## 2023-07-13 LAB
ALBUMIN SERPL BCP-MCNC: 4.8 G/DL (ref 3.5–5)
ALP SERPL-CCNC: 108 U/L (ref 34–104)
ALT SERPL W P-5'-P-CCNC: 47 U/L (ref 7–52)
ANION GAP SERPL CALCULATED.3IONS-SCNC: 8 MMOL/L
AST SERPL W P-5'-P-CCNC: 28 U/L (ref 13–39)
BASOPHILS # BLD AUTO: 0.1 THOUSANDS/ÂΜL (ref 0–0.1)
BASOPHILS NFR BLD AUTO: 1 % (ref 0–1)
BILIRUB SERPL-MCNC: 0.8 MG/DL (ref 0.2–1)
BUN SERPL-MCNC: 7 MG/DL (ref 5–25)
CALCIUM SERPL-MCNC: 10.2 MG/DL (ref 8.4–10.2)
CHLORIDE SERPL-SCNC: 104 MMOL/L (ref 96–108)
CO2 SERPL-SCNC: 23 MMOL/L (ref 21–32)
CREAT SERPL-MCNC: 0.87 MG/DL (ref 0.6–1.3)
EOSINOPHIL # BLD AUTO: 0.15 THOUSAND/ÂΜL (ref 0–0.61)
EOSINOPHIL NFR BLD AUTO: 1 % (ref 0–6)
ERYTHROCYTE [DISTWIDTH] IN BLOOD BY AUTOMATED COUNT: 18.4 % (ref 11.6–15.1)
GFR SERPL CREATININE-BSD FRML MDRD: 115 ML/MIN/1.73SQ M
GLUCOSE SERPL-MCNC: 81 MG/DL (ref 65–140)
HCT VFR BLD AUTO: 36.2 % (ref 36.5–49.3)
HGB BLD-MCNC: 11.2 G/DL (ref 12–17)
IMM GRANULOCYTES # BLD AUTO: 0.04 THOUSAND/UL (ref 0–0.2)
IMM GRANULOCYTES NFR BLD AUTO: 0 % (ref 0–2)
LIPASE SERPL-CCNC: 19 U/L (ref 11–82)
LYMPHOCYTES # BLD AUTO: 1.78 THOUSANDS/ÂΜL (ref 0.6–4.47)
LYMPHOCYTES NFR BLD AUTO: 17 % (ref 14–44)
MCH RBC QN AUTO: 19.4 PG (ref 26.8–34.3)
MCHC RBC AUTO-ENTMCNC: 30.9 G/DL (ref 31.4–37.4)
MCV RBC AUTO: 63 FL (ref 82–98)
MONOCYTES # BLD AUTO: 0.72 THOUSAND/ÂΜL (ref 0.17–1.22)
MONOCYTES NFR BLD AUTO: 7 % (ref 4–12)
NEUTROPHILS # BLD AUTO: 7.76 THOUSANDS/ÂΜL (ref 1.85–7.62)
NEUTS SEG NFR BLD AUTO: 74 % (ref 43–75)
NRBC BLD AUTO-RTO: 0 /100 WBCS
PLATELET # BLD AUTO: 325 THOUSANDS/UL (ref 149–390)
PMV BLD AUTO: 8.3 FL (ref 8.9–12.7)
POTASSIUM SERPL-SCNC: 4.1 MMOL/L (ref 3.5–5.3)
PROT SERPL-MCNC: 8.3 G/DL (ref 6.4–8.4)
RBC # BLD AUTO: 5.78 MILLION/UL (ref 3.88–5.62)
SODIUM SERPL-SCNC: 135 MMOL/L (ref 135–147)
WBC # BLD AUTO: 10.55 THOUSAND/UL (ref 4.31–10.16)

## 2023-07-13 PROCEDURE — 36415 COLL VENOUS BLD VENIPUNCTURE: CPT | Performed by: STUDENT IN AN ORGANIZED HEALTH CARE EDUCATION/TRAINING PROGRAM

## 2023-07-13 PROCEDURE — 80053 COMPREHEN METABOLIC PANEL: CPT | Performed by: STUDENT IN AN ORGANIZED HEALTH CARE EDUCATION/TRAINING PROGRAM

## 2023-07-13 PROCEDURE — G1004 CDSM NDSC: HCPCS

## 2023-07-13 PROCEDURE — 85025 COMPLETE CBC W/AUTO DIFF WBC: CPT | Performed by: STUDENT IN AN ORGANIZED HEALTH CARE EDUCATION/TRAINING PROGRAM

## 2023-07-13 PROCEDURE — 83690 ASSAY OF LIPASE: CPT | Performed by: STUDENT IN AN ORGANIZED HEALTH CARE EDUCATION/TRAINING PROGRAM

## 2023-07-13 PROCEDURE — 74177 CT ABD & PELVIS W/CONTRAST: CPT

## 2023-07-13 RX ORDER — HYDROMORPHONE HCL/PF 1 MG/ML
0.5 SYRINGE (ML) INJECTION ONCE
Status: COMPLETED | OUTPATIENT
Start: 2023-07-13 | End: 2023-07-13

## 2023-07-13 RX ORDER — ONDANSETRON 2 MG/ML
4 INJECTION INTRAMUSCULAR; INTRAVENOUS ONCE
Status: COMPLETED | OUTPATIENT
Start: 2023-07-13 | End: 2023-07-13

## 2023-07-13 RX ADMIN — HYDROMORPHONE HYDROCHLORIDE 0.5 MG: 1 INJECTION, SOLUTION INTRAMUSCULAR; INTRAVENOUS; SUBCUTANEOUS at 17:27

## 2023-07-13 RX ADMIN — IOHEXOL 100 ML: 350 INJECTION, SOLUTION INTRAVENOUS at 16:13

## 2023-07-13 RX ADMIN — HYDROMORPHONE HYDROCHLORIDE 0.5 MG: 1 INJECTION, SOLUTION INTRAMUSCULAR; INTRAVENOUS; SUBCUTANEOUS at 15:02

## 2023-07-13 RX ADMIN — HYDROMORPHONE HYDROCHLORIDE 0.5 MG: 1 INJECTION, SOLUTION INTRAMUSCULAR; INTRAVENOUS; SUBCUTANEOUS at 15:58

## 2023-07-13 RX ADMIN — ONDANSETRON 4 MG: 2 INJECTION INTRAMUSCULAR; INTRAVENOUS at 14:34

## 2023-07-13 RX ADMIN — HYDROMORPHONE HYDROCHLORIDE 0.5 MG: 1 INJECTION, SOLUTION INTRAMUSCULAR; INTRAVENOUS; SUBCUTANEOUS at 14:34

## 2023-07-13 RX ADMIN — IOHEXOL 50 ML: 240 INJECTION, SOLUTION INTRATHECAL; INTRAVASCULAR; INTRAVENOUS; ORAL at 14:37

## 2023-07-13 NOTE — Clinical Note
Rula Latisha was seen and treated in our emergency department on 7/13/2023. No restrictions            Diagnosis:     Matias Blake  may return to work on return date. He may return on this date: 07/17/2023         If you have any questions or concerns, please don't hesitate to call.       Mk Ruano, DO    ______________________________           _______________          _______________  Hospital Representative                              Date                                Time

## 2023-07-13 NOTE — Clinical Note
Nayanmini Roscoe was seen and treated in our emergency department on 7/13/2023. No restrictions            Diagnosis:     [address]  may return to work on return date. He may return on this date: 07/17/2023         If you have any questions or concerns, please don't hesitate to call.       Tino Whitlock, DO    ______________________________           _______________          _______________  Hospital Representative                              Date                                Time

## 2023-07-13 NOTE — ED PROVIDER NOTES
History  Chief Complaint   Patient presents with   • Abdominal Pain     LLQ pain     Patient is a 75-year-old male, past medical history of ankylosing spondylitis, ulcerative colitis status post colectomy with ileal pouch and anastomosis, who presents to the emergency room for left lower quadrant abdominal pain. Patient states pain started yesterday in the afternoon. It was severe at first but slowly improved. He states that after getting to work today the pain has worsened again. No modifying factors. Does not radiate. Has had some similar pain in the past but nothing in this location. No fevers or chills. No nausea or vomiting. Has had decreased output from his ostomy. No other complaints or concerns. Chart reviewed. Patient has several visits to the emergency department for abdominal pain. Most recently he was seen on 2023 for right upper quadrant abdominal pain. On arrival patient was tachycardic and hypertensive but otherwise vitals were stable. He underwent a CAT scan that was unremarkable. He was discharged. Prior to Admission Medications   Prescriptions Last Dose Informant Patient Reported? Taking? ALPRAZolam (XANAX) 0.5 mg tablet  Self No No   Si-2 po q8hrs prn anxiety, avoid use with opioids   HYDROmorphone (DILAUDID) 2 mg tablet  Self Yes No   Patient not taking: Reported on 2023   acetaminophen (TYLENOL) 325 mg tablet  Self No No   Sig: Take 2 tablets (650 mg total) by mouth every 6 (six) hours as needed for mild pain, headaches or fever   diphenoxylate-atropine (LOMOTIL) 2.5-0.025 mg per tablet  Self Yes No   Sig: Take 2 tablets by mouth   Patient not taking: Reported on 2023   loperamide (IMODIUM) 2 mg capsule  Self No No   Sig: Take 1 capsule (2 mg total) by mouth 3 (three) times a day before meals Do not start before 2023.    temazepam (RESTORIL) 7.5 mg capsule   No No   Sig: Take 1 capsule (7.5 mg total) by mouth daily at bedtime as needed for sleep Avoid use with xanax and opioids      Facility-Administered Medications: None       Past Medical History:   Diagnosis Date   • Ankylosing spondylitis (HCC)    • Anxiety    • Bowel obstruction (HCC)    • Clostridium difficile colitis 9/13/2018   • Colitis    • 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[address]) 9/13/2018   • Pancreatitis    • Ulcerative colitis ([address])        Past Surgical History:   Procedure Laterality Date   • COLECTOMY TOTAL      with ileal pouch and anastemosis   • IR PICC PLACEMENT SINGLE LUMEN  3/1/2022   • TOTAL COLECTOMY         Family History   Problem Relation Age of Onset   • Lung disease Neg Hx      I have reviewed and agree with the history as documented. E-Cigarette/Vaping   • E-Cigarette Use Never User      E-Cigarette/Vaping Substances   • Nicotine No    • THC No    • CBD No    • Flavoring No    • Other No    • Unknown No      Social History     Tobacco Use   • Smoking status: Never   • Smokeless tobacco: Never   Vaping Use   • Vaping Use: Never used   Substance Use Topics   • Alcohol use: Yes     Comment: pt states 5 a month/socially   • Drug use: No       Review of Systems   Constitutional: Negative for chills and fever. Gastrointestinal: Positive for abdominal pain. Negative for diarrhea and vomiting. Physical Exam  Physical Exam  Vitals and nursing note reviewed. Constitutional:       General: He is not in acute distress. Appearance: He is well-developed. He is not diaphoretic. HENT:      Head: Normocephalic and atraumatic. Right Ear: External ear normal.      Left Ear: External ear normal.      Nose: Nose normal.   Eyes:      General: Lids are normal. No scleral icterus. Cardiovascular:      Rate and Rhythm: Normal rate and regular rhythm. Heart sounds: Normal heart sounds. No murmur heard. No friction rub. No gallop. Pulmonary:      Effort: Pulmonary effort is normal. No respiratory distress. Breath sounds: Normal breath sounds. No wheezing or rales.    Abdominal: Palpations: Abdomen is soft. Tenderness: There is abdominal tenderness in the left lower quadrant. There is no guarding or rebound. Comments: Ostomy in place. No output. Musculoskeletal:         General: No deformity. Normal range of motion. Cervical back: Normal range of motion and neck supple. Skin:     General: Skin is warm and dry. Neurological:      General: No focal deficit present. Mental Status: He is alert.    Psychiatric:         Mood and Affect: Mood normal.         Behavior: Behavior normal.         Vital Signs  ED Triage Vitals [07/13/23 1401]   Temperature Pulse Respirations Blood Pressure SpO2   99.3 °F (37.4 °C) 98 18 150/67 97 %      Temp Source Heart Rate Source Patient Position - Orthostatic VS BP Location FiO2 (%)   Tympanic Monitor Sitting Left arm --      Pain Score       8           Vitals:    07/13/23 1401   BP: 150/67   Pulse: 98   Patient Position - Orthostatic VS: Sitting         Visual Acuity      ED Medications  Medications   ondansetron (ZOFRAN) injection 4 mg (4 mg Intravenous Given 7/13/23 1434)   HYDROmorphone (DILAUDID) injection 0.5 mg (0.5 mg Intravenous Given 7/13/23 1434)   iohexol (OMNIPAQUE) 240 MG/ML solution 50 mL (50 mL Oral Given 7/13/23 1437)   HYDROmorphone (DILAUDID) injection 0.5 mg (0.5 mg Intravenous Given 7/13/23 1502)   HYDROmorphone (DILAUDID) injection 0.5 mg (0.5 mg Intravenous Given 7/13/23 1558)   iohexol (OMNIPAQUE) 350 MG/ML injection (SINGLE-DOSE) 100 mL (100 mL Intravenous Given 7/13/23 1613)   HYDROmorphone (DILAUDID) injection 0.5 mg (0.5 mg Intravenous Given 7/13/23 1727)       Diagnostic Studies  Results Reviewed     Procedure Component Value Units Date/Time    CBC and differential [[id number]]  (Abnormal) Collected: 07/13/23 1507    Lab Status: Final result Specimen: Blood from Arm, Right Updated: 07/13/23 1511     WBC 10.55 Thousand/uL      RBC 5.78 Million/uL      Hemoglobin 11.2 g/dL      Hematocrit 36.2 %      MCV 63 fL      MCH 19.4 pg      MCHC 30.9 g/dL      RDW 18.4 %      MPV 8.3 fL      Platelets 283 Thousands/uL      nRBC 0 /100 WBCs      Neutrophils Relative 74 %      Immat GRANS % 0 %      Lymphocytes Relative 17 %      Monocytes Relative 7 %      Eosinophils Relative 1 %      Basophils Relative 1 %      Neutrophils Absolute 7.76 Thousands/µL      Immature Grans Absolute 0.04 Thousand/uL      Lymphocytes Absolute 1.78 Thousands/µL      Monocytes Absolute 0.72 Thousand/µL      Eosinophils Absolute 0.15 Thousand/µL      Basophils Absolute 0.10 Thousands/µL     CMP [[id number]]  (Abnormal) Collected: 07/13/23 1423    Lab Status: Final result Specimen: Blood from Arm, Right Updated: 07/13/23 1507     Sodium 135 mmol/L      Potassium 4.1 mmol/L      Chloride 104 mmol/L      CO2 23 mmol/L      ANION GAP 8 mmol/L      BUN 7 mg/dL      Creatinine 0.87 mg/dL      Glucose 81 mg/dL      Calcium 10.2 mg/dL      AST 28 U/L      ALT 47 U/L      Alkaline Phosphatase 108 U/L      Total Protein 8.3 g/dL      Albumin 4.8 g/dL      Total Bilirubin 0.80 mg/dL      eGFR 115 ml/min/1.73sq m     Narrative:      [place] guidelines for Chronic Kidney Disease (CKD):   •  Stage 1 with normal or high GFR (GFR > 90 mL/min/1.73 square meters)  •  Stage 2 Mild CKD (GFR = 60-89 mL/min/1.73 square meters)  •  Stage 3A Moderate CKD (GFR = 45-59 mL/min/1.73 square meters)  •  Stage 3B Moderate CKD (GFR = 30-44 mL/min/1.73 square meters)  •  Stage 4 Severe CKD (GFR = 15-29 mL/min/1.73 square meters)  •  Stage 5 End Stage CKD (GFR <15 mL/min/1.73 square meters)  Note: GFR calculation is accurate only with a steady state creatinine    Lipase [[id number]]  (Normal) Collected: 07/13/23 1423    Lab Status: Final result Specimen: Blood from Arm, Right Updated: 07/13/23 1507     Lipase 19 u/L                  CT Abdomen pelvis with contrast   Final Result by Stacey Allen MD (07/13 1704)      No interval change with mild thickening of the J-pouch reidentified. Workstation performed: [id number]                    Procedures  Procedures         ED Course  ED Course as of 07/13/23 Nadia Calles Jul 13, 2023   1457 Patient still in pain. Will give another dose of Dilaudid   1508 Lipase: 19   1508 CMP(!)  Stable, no actionable derangements   1557 Patient reevaluated. Still in pain. Will give another dose of Dilaudid   1706 CT Abdomen pelvis with contrast  No interval change with mild thickening of the J-pouch reidentified. 1710 Patient reevaluated. States pain is mostly controlled. Discussed negative CT. Will give another dose of pain meds and reevaluate. States he ultimately feels like he wants to go home. 1750 Patient again reevaluated. States he is still in a mild amount of pain. Offered admission for intractable pain. However, patient declined. As there is no indication for further work-up or treatment in the emergency department at this time will discharge. Recommended PCP follow-up. Return precautions discussed. Patient verbalized understanding and agreed with plan of care. Medical Decision Making  Patient is a 25 y.o. male who presents to the ED for left lower quadrant abdominal pain. Patient is nontoxic and well-appearing. Vitals are stable. On exam he has moderate left lower quadrant tenderness without rebound or guarding. Abdomen is soft. No evidence of acute abdomen at this time. Differential diagnosis includes: Diverticulitis, obstruction, gastroenteritis. Low suspicion for acute hepatobiliary disease (includng acute cholecystitis), acute pancreatitis, PUD (including perforation), acute infectious processes (pneumonia, hepatitis, pyelonephritis), acute appendicitis, vascular catastrophe, viscus perforation. Presentation not consistent with other acute, emergent causes of abdominal pain at this time.     Plan: labs, UA, CT AP, pain control, serial reassessment                 Portions of the record may have been created with voice recognition software. Occasional wrong word or "sound a like" substitutions may have occurred due to the inherent limitations of voice recognition software. Read the chart carefully and recognize, using context, where substitutions have occurred. Amount and/or Complexity of Data Reviewed  Labs: ordered. Radiology: ordered. Risk  Prescription drug management. Disposition  Final diagnoses:   Abdominal pain     Time reflects when diagnosis was documented in both MDM as applicable and the Disposition within this note     Time User Action Codes Description Comment    7/13/2023  5:43 PM Violet Christianson Add [R10.9] Abdominal pain       ED Disposition     ED Disposition   Discharge    Condition   Stable    Date/Time   Thu Jul 13, 2023  5:43 PM    Comment   Migue Domingo discharge to home/self care.                Follow-up Information     Follow up With Specialties Details Why Contact Info Additional 40 Josh Almanza DO Family Medicine   [address] [address] Emergency Department Emergency Medicine  As needed [address] [address]  [address] [facility] Emergency Department, [address] [address]          Discharge Medication List as of 7/13/2023  5:51 PM      CONTINUE these medications which have NOT CHANGED    Details   acetaminophen (TYLENOL) 325 mg tablet Take 2 tablets (650 mg total) by mouth every 6 (six) hours as needed for mild pain, headaches or fever, Starting Tue 1/24/2023, No Print      ALPRAZolam (XANAX) 0.5 mg tablet 1-2 po q8hrs prn anxiety, avoid use with opioids, Normal      diphenoxylate-atropine (LOMOTIL) 2.5-0.025 mg per tablet Take 2 tablets by mouth, Starting Mon 2/20/2023, Historical Med      HYDROmorphone (DILAUDID) 2 mg tablet Starting Mon 5/1/2023, Historical Med      loperamide (IMODIUM) 2 mg capsule Take 1 capsule (2 mg total) by mouth 3 (three) times a day before meals Do not start before March 16, 2023., Starting u 3/16/2023, Normal      temazepam (RESTORIL) 7.5 mg capsule Take 1 capsule (7.5 mg total) by mouth daily at bedtime as needed for sleep Avoid use with xanax and opioids, Starting u 5/18/2023, Normal             No discharge procedures on file.     PDMP Review       Value Time User    PDMP Reviewed  Yes 5/18/2023 11:10 AM Imtiaz Orozco DO          ED Provider  Electronically Signed by           Juliana Ronquillo DO  07/13/23 5828

## 2023-07-13 NOTE — Clinical Note
Can Spauldingtead was seen and treated in our emergency department on 7/13/2023. No restrictions            Diagnosis:     Li Mcallister  may return to work on return date. He may return on this date: 07/17/2023         If you have any questions or concerns, please don't hesitate to call.       Alba Fuller, DO    ______________________________           _______________          _______________  Hospital Representative                              Date                                Time

## 2023-08-10 DIAGNOSIS — F41.8 SITUATIONAL ANXIETY: ICD-10-CM

## 2023-08-10 RX ORDER — ALPRAZOLAM 0.5 MG/1
TABLET ORAL
Qty: 90 TABLET | Refills: 0 | Status: SHIPPED | OUTPATIENT
Start: 2023-08-10

## 2023-09-25 ENCOUNTER — APPOINTMENT (EMERGENCY)
Dept: RADIOLOGY | Facility: HOSPITAL | Age: 28
End: 2023-09-25
Payer: COMMERCIAL

## 2023-09-25 ENCOUNTER — HOSPITAL ENCOUNTER (EMERGENCY)
Facility: HOSPITAL | Age: 28
Discharge: HOME | End: 2023-09-25
Attending: EMERGENCY MEDICINE
Payer: COMMERCIAL

## 2023-09-25 VITALS
TEMPERATURE: 98.8 F | RESPIRATION RATE: 17 BRPM | OXYGEN SATURATION: 95 % | SYSTOLIC BLOOD PRESSURE: 123 MMHG | WEIGHT: 170 LBS | DIASTOLIC BLOOD PRESSURE: 74 MMHG | HEART RATE: 88 BPM | HEIGHT: 69 IN | BODY MASS INDEX: 25.18 KG/M2

## 2023-09-25 DIAGNOSIS — R10.9 ABDOMINAL PAIN: Primary | ICD-10-CM

## 2023-09-25 LAB
ALBUMIN SERPL BCP-MCNC: 4.1 G/DL (ref 3.5–5)
ALP SERPL-CCNC: 108 U/L (ref 34–104)
ALT SERPL W P-5'-P-CCNC: 32 U/L (ref 7–52)
ANION GAP SERPL CALCULATED.3IONS-SCNC: 4 MMOL/L
AST SERPL W P-5'-P-CCNC: 20 U/L (ref 13–39)
BASOPHILS # BLD AUTO: 0.11 THOUSANDS/ÂΜL (ref 0–0.1)
BASOPHILS NFR BLD AUTO: 1 % (ref 0–1)
BILIRUB SERPL-MCNC: 0.53 MG/DL (ref 0.2–1)
BUN SERPL-MCNC: 7 MG/DL (ref 5–25)
CALCIUM SERPL-MCNC: 9.3 MG/DL (ref 8.4–10.2)
CHLORIDE SERPL-SCNC: 104 MMOL/L (ref 96–108)
CO2 SERPL-SCNC: 29 MMOL/L (ref 21–32)
CREAT SERPL-MCNC: 0.84 MG/DL (ref 0.6–1.3)
EOSINOPHIL # BLD AUTO: 0.12 THOUSAND/ÂΜL (ref 0–0.61)
EOSINOPHIL NFR BLD AUTO: 2 % (ref 0–6)
ERYTHROCYTE [DISTWIDTH] IN BLOOD BY AUTOMATED COUNT: 17.8 % (ref 11.6–15.1)
GFR SERPL CREATININE-BSD FRML MDRD: 117 ML/MIN/1.73SQ M
GLUCOSE SERPL-MCNC: 85 MG/DL (ref 65–140)
HCT VFR BLD AUTO: 35.6 % (ref 36.5–49.3)
HGB BLD-MCNC: 10.7 G/DL (ref 12–17)
IMM GRANULOCYTES # BLD AUTO: 0.03 THOUSAND/UL (ref 0–0.2)
IMM GRANULOCYTES NFR BLD AUTO: 0 % (ref 0–2)
LIPASE SERPL-CCNC: 25 U/L (ref 11–82)
LYMPHOCYTES # BLD AUTO: 1.06 THOUSANDS/ÂΜL (ref 0.6–4.47)
LYMPHOCYTES NFR BLD AUTO: 13 % (ref 14–44)
MCH RBC QN AUTO: 19 PG (ref 26.8–34.3)
MCHC RBC AUTO-ENTMCNC: 30.1 G/DL (ref 31.4–37.4)
MCV RBC AUTO: 63 FL (ref 82–98)
MONOCYTES # BLD AUTO: 0.67 THOUSAND/ÂΜL (ref 0.17–1.22)
MONOCYTES NFR BLD AUTO: 8 % (ref 4–12)
NEUTROPHILS # BLD AUTO: 5.98 THOUSANDS/ÂΜL (ref 1.85–7.62)
NEUTS SEG NFR BLD AUTO: 76 % (ref 43–75)
NRBC BLD AUTO-RTO: 0 /100 WBCS
PLATELET # BLD AUTO: 434 THOUSANDS/UL (ref 149–390)
PMV BLD AUTO: 8.4 FL (ref 8.9–12.7)
POTASSIUM SERPL-SCNC: 4 MMOL/L (ref 3.5–5.3)
PROT SERPL-MCNC: 7.1 G/DL (ref 6.4–8.4)
RBC # BLD AUTO: 5.63 MILLION/UL (ref 3.88–5.62)
SODIUM SERPL-SCNC: 137 MMOL/L (ref 135–147)
WBC # BLD AUTO: 7.97 THOUSAND/UL (ref 4.31–10.16)

## 2023-09-25 PROCEDURE — 80053 COMPREHEN METABOLIC PANEL: CPT | Performed by: EMERGENCY MEDICINE

## 2023-09-25 PROCEDURE — 36415 COLL VENOUS BLD VENIPUNCTURE: CPT | Performed by: EMERGENCY MEDICINE

## 2023-09-25 PROCEDURE — 99285 EMERGENCY DEPT VISIT HI MDM: CPT | Performed by: EMERGENCY MEDICINE

## 2023-09-25 PROCEDURE — 85025 COMPLETE CBC W/AUTO DIFF WBC: CPT | Performed by: EMERGENCY MEDICINE

## 2023-09-25 PROCEDURE — 96375 TX/PRO/DX INJ NEW DRUG ADDON: CPT

## 2023-09-25 PROCEDURE — 74177 CT ABD & PELVIS W/CONTRAST: CPT

## 2023-09-25 PROCEDURE — 96376 TX/PRO/DX INJ SAME DRUG ADON: CPT

## 2023-09-25 PROCEDURE — 83690 ASSAY OF LIPASE: CPT | Performed by: EMERGENCY MEDICINE

## 2023-09-25 PROCEDURE — 99285 EMERGENCY DEPT VISIT HI MDM: CPT

## 2023-09-25 PROCEDURE — 74022 RADEX COMPL AQT ABD SERIES: CPT

## 2023-09-25 PROCEDURE — 96374 THER/PROPH/DIAG INJ IV PUSH: CPT

## 2023-09-25 PROCEDURE — 96361 HYDRATE IV INFUSION ADD-ON: CPT

## 2023-09-25 RX ORDER — MAGNESIUM HYDROXIDE/ALUMINUM HYDROXICE/SIMETHICONE 120; 1200; 1200 MG/30ML; MG/30ML; MG/30ML
30 SUSPENSION ORAL ONCE
Status: COMPLETED | OUTPATIENT
Start: 2023-09-25 | End: 2023-09-25

## 2023-09-25 RX ORDER — HYDROMORPHONE HCL/PF 1 MG/ML
1 SYRINGE (ML) INJECTION ONCE
Status: COMPLETED | OUTPATIENT
Start: 2023-09-25 | End: 2023-09-25

## 2023-09-25 RX ORDER — ONDANSETRON 2 MG/ML
4 INJECTION INTRAMUSCULAR; INTRAVENOUS ONCE
Status: COMPLETED | OUTPATIENT
Start: 2023-09-25 | End: 2023-09-25

## 2023-09-25 RX ADMIN — SODIUM CHLORIDE 1000 ML: 0.9 INJECTION, SOLUTION INTRAVENOUS at 11:09

## 2023-09-25 RX ADMIN — SODIUM CHLORIDE 1000 ML: 0.9 INJECTION, SOLUTION INTRAVENOUS at 09:07

## 2023-09-25 RX ADMIN — ONDANSETRON 4 MG: 2 INJECTION INTRAMUSCULAR; INTRAVENOUS at 09:04

## 2023-09-25 RX ADMIN — IOHEXOL 100 ML: 350 INJECTION, SOLUTION INTRAVENOUS at 12:45

## 2023-09-25 RX ADMIN — HYDROMORPHONE HYDROCHLORIDE 1 MG: 1 INJECTION, SOLUTION INTRAMUSCULAR; INTRAVENOUS; SUBCUTANEOUS at 09:03

## 2023-09-25 RX ADMIN — ALUMINUM HYDROXIDE, MAGNESIUM HYDROXIDE, AND DIMETHICONE 30 ML: 200; 20; 200 SUSPENSION ORAL at 14:23

## 2023-09-25 RX ADMIN — HYDROMORPHONE HYDROCHLORIDE 1 MG: 1 INJECTION, SOLUTION INTRAMUSCULAR; INTRAVENOUS; SUBCUTANEOUS at 12:20

## 2023-09-25 RX ADMIN — HYDROMORPHONE HYDROCHLORIDE 1 MG: 1 INJECTION, SOLUTION INTRAMUSCULAR; INTRAVENOUS; SUBCUTANEOUS at 09:53

## 2023-09-25 RX ADMIN — ONDANSETRON 4 MG: 2 INJECTION INTRAMUSCULAR; INTRAVENOUS at 12:14

## 2023-09-25 RX ADMIN — IOHEXOL 50 ML: 240 INJECTION, SOLUTION INTRATHECAL; INTRAVASCULAR; INTRAVENOUS; ORAL at 11:09

## 2023-09-25 NOTE — ED PROVIDER NOTES
History  Chief Complaint   Patient presents with   • Abdominal Pain     Pt reports waking up in the night with no output in ileostomy. Pt again woke up at 6an with no output in ilieostomy and pain to the left of the ileostomy. Patient presents for evaluation of abdominal pain. Patient states he woke up last night around 12 AM and there is no output in the ileostomy which was unusual for him. He went back to sleep as he did not have any pain at that time. He woke up at 6 AM this time with pain just to the left of the ileostomy and very minimal output. He is now experiencing nausea but no vomiting. States yesterday had normal output through the ostomy and normal po intake for him. History provided by:  Patient   used: No    Abdominal Pain  Associated symptoms: nausea    Associated symptoms: no vomiting        Prior to Admission Medications   Prescriptions Last Dose Informant Patient Reported? Taking? ALPRAZolam (XANAX) 0.5 mg tablet   No No   Si-2 po q8hrs prn anxiety, avoid use with opioids   HYDROmorphone (DILAUDID) 2 mg tablet  Self Yes No   Patient not taking: Reported on 2023   acetaminophen (TYLENOL) 325 mg tablet  Self No No   Sig: Take 2 tablets (650 mg total) by mouth every 6 (six) hours as needed for mild pain, headaches or fever   diphenoxylate-atropine (LOMOTIL) 2.5-0.025 mg per tablet  Self Yes No   Sig: Take 2 tablets by mouth   Patient not taking: Reported on 2023   loperamide (IMODIUM) 2 mg capsule  Self No No   Sig: Take 1 capsule (2 mg total) by mouth 3 (three) times a day before meals Do not start before 2023.    temazepam (RESTORIL) 7.5 mg capsule   No No   Sig: Take 1 capsule (7.5 mg total) by mouth daily at bedtime as needed for sleep Avoid use with xanax and opioids      Facility-Administered Medications: None       Past Medical History:   Diagnosis Date   • Ankylosing spondylitis ([address])    • Anxiety    • Bowel obstruction (HCC)    • Clostridium difficile colitis 9/13/2018   • Colitis    • 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[address]) 9/13/2018   • Pancreatitis    • Ulcerative colitis ([address])        Past Surgical History:   Procedure Laterality Date   • COLECTOMY TOTAL      with ileal pouch and anastemosis   • IR PICC PLACEMENT SINGLE LUMEN  3/1/2022   • TOTAL COLECTOMY         Family History   Problem Relation Age of Onset   • Lung disease Neg Hx      I have reviewed and agree with the history as documented. E-Cigarette/Vaping   • E-Cigarette Use Never User      E-Cigarette/Vaping Substances   • Nicotine No    • THC No    • CBD No    • Flavoring No    • Other No    • Unknown No      Social History     Tobacco Use   • Smoking status: Never   • Smokeless tobacco: Never   Vaping Use   • Vaping Use: Never used   Substance Use Topics   • Alcohol use: Not Currently     Comment: pt states 5 a month/socially   • Drug use: No       Review of Systems   Gastrointestinal: Positive for abdominal pain and nausea. Negative for vomiting. All other systems reviewed and are negative. Physical Exam  Physical Exam  Vitals and nursing note reviewed. Constitutional:       General: He is not in acute distress. Cardiovascular:      Rate and Rhythm: Normal rate and regular rhythm. Pulmonary:      Effort: Pulmonary effort is normal. No respiratory distress. Breath sounds: Normal breath sounds. Abdominal:      General: Abdomen is flat. Bowel sounds are normal. There is no distension. Palpations: Abdomen is soft. Tenderness: There is abdominal tenderness. There is no guarding or rebound. Musculoskeletal:         General: No deformity. Normal range of motion. Skin:     Capillary Refill: Capillary refill takes less than 2 seconds. Findings: No rash. Neurological:      General: No focal deficit present. Mental Status: He is alert and oriented to person, place, and time.          Vital Signs  ED Triage Vitals [09/25/23 0814]   Temperature Pulse Respirations Blood Pressure SpO2   98.8 °F (37.1 °C) 98 20 123/74 99 %      Temp Source Heart Rate Source Patient Position - Orthostatic VS BP Location FiO2 (%)   Tympanic Monitor Sitting Right arm --      Pain Score       8           Vitals:    09/25/23 0814 09/25/23 1230 09/25/23 1415   BP: 123/74     Pulse: 98 94 88   Patient Position - Orthostatic VS: Sitting           Visual Acuity      ED Medications  Medications   sodium chloride 0.9 % bolus 1,000 mL (0 mL Intravenous Stopped 9/25/23 1101)   ondansetron (ZOFRAN) injection 4 mg (4 mg Intravenous Given 9/25/23 0904)   HYDROmorphone (DILAUDID) injection 1 mg (1 mg Intravenous Given 9/25/23 0903)   HYDROmorphone (DILAUDID) injection 1 mg (1 mg Intravenous Given 9/25/23 0953)   iohexol (OMNIPAQUE) 240 MG/ML solution 50 mL (50 mL Oral Given 9/25/23 1109)   sodium chloride 0.9 % bolus 1,000 mL (0 mL Intravenous Stopped 9/25/23 1209)   ondansetron (ZOFRAN) injection 4 mg (4 mg Intravenous Given 9/25/23 1214)   HYDROmorphone (DILAUDID) injection 1 mg (1 mg Intravenous Given 9/25/23 1220)   iohexol (OMNIPAQUE) 350 MG/ML injection (MULTI-DOSE) 100 mL (100 mL Intravenous Given 9/25/23 1245)   aluminum-magnesium hydroxide-simethicone (MAALOX) oral suspension 30 mL (30 mL Oral Given 9/25/23 1423)       Diagnostic Studies  Results Reviewed     Procedure Component Value Units Date/Time    Lipase [[id number]]  (Normal) Collected: 09/25/23 0909    Lab Status: Final result Specimen: Blood from Arm, Right Updated: 09/25/23 0930     Lipase 25 u/L     Comprehensive metabolic panel [[id number]]  (Abnormal) Collected: 09/25/23 0909    Lab Status: Final result Specimen: Blood from Arm, Right Updated: 09/25/23 0930     Sodium 137 mmol/L      Potassium 4.0 mmol/L      Chloride 104 mmol/L      CO2 29 mmol/L      ANION GAP 4 mmol/L      BUN 7 mg/dL      Creatinine 0.84 mg/dL      Glucose 85 mg/dL      Calcium 9.3 mg/dL      AST 20 U/L      ALT 32 U/L      Alkaline Phosphatase 108 U/L Total Protein 7.1 g/dL      Albumin 4.1 g/dL      Total Bilirubin 0.53 mg/dL      eGFR 117 ml/min/1.73sq m     Narrative:      [place] guidelines for Chronic Kidney Disease (CKD):   •  Stage 1 with normal or high GFR (GFR > 90 mL/min/1.73 square meters)  •  Stage 2 Mild CKD (GFR = 60-89 mL/min/1.73 square meters)  •  Stage 3A Moderate CKD (GFR = 45-59 mL/min/1.73 square meters)  •  Stage 3B Moderate CKD (GFR = 30-44 mL/min/1.73 square meters)  •  Stage 4 Severe CKD (GFR = 15-29 mL/min/1.73 square meters)  •  Stage 5 End Stage CKD (GFR <15 mL/min/1.73 square meters)  Note: GFR calculation is accurate only with a steady state creatinine    CBC and differential [[id number]]  (Abnormal) Collected: 09/25/23 0909    Lab Status: Final result Specimen: Blood from Arm, Right Updated: 09/25/23 0914     WBC 7.97 Thousand/uL      RBC 5.63 Million/uL      Hemoglobin 10.7 g/dL      Hematocrit 35.6 %      MCV 63 fL      MCH 19.0 pg      MCHC 30.1 g/dL      RDW 17.8 %      MPV 8.4 fL      Platelets 111 Thousands/uL      nRBC 0 /100 WBCs      Neutrophils Relative 76 %      Immat GRANS % 0 %      Lymphocytes Relative 13 %      Monocytes Relative 8 %      Eosinophils Relative 2 %      Basophils Relative 1 %      Neutrophils Absolute 5.98 Thousands/µL      Immature Grans Absolute 0.03 Thousand/uL      Lymphocytes Absolute 1.06 Thousands/µL      Monocytes Absolute 0.67 Thousand/µL      Eosinophils Absolute 0.12 Thousand/µL      Basophils Absolute 0.11 Thousands/µL                  CT abdomen pelvis with contrast   Final Result by Felton Castro MD (09/25 2647)      No acute intra-abdominal pathology. Stable appearance of the right lower quadrant ileostomy status post colectomy. Redemonstrated J-pouch with mild chronic mucosal thickening.             Workstation performed: [id number]         XR abdomen obstruction series   Final Result by Ashtyn Lee MD (09/25 9787)      Given history of no ostomy output and proximal dilated small bowel loops, obstructive process versus ileus suggested. The study was marked in EPIC for significant notification. Workstation performed: [id number]                    Procedures  Procedures         ED Course                               SBIRT 20yo+    Flowsheet Row Most Recent Value   Initial Alcohol Screen: US AUDIT-C     1. How often do you have a drink containing alcohol? 0 Filed at: 09/25/2023 0817   3a. Male UNDER 65: How often do you have five or more drinks on one occasion? 0 Filed at: 09/25/2023 0817   Audit-C Score 0 Filed at: 09/25/2023 6536   ROD: How many times in the past year have you. .. Used an illegal drug or used a prescription medication for non-medical reasons? Never Filed at: 09/25/2023 3075                    Medical Decision Making  Pulse ox 99% on room air indicating adequate oxygenation  Xray Abd/Obstructive series: ileus, no free air as read by me      CT and lab work discussed with patient at bedside. No acute abnormality. Patient was able to tolerate p.o. prior to discharge. Abdominal pain: acute illness or injury  Amount and/or Complexity of Data Reviewed  Labs: ordered. Radiology: ordered and independent interpretation performed. Risk  OTC drugs. Prescription drug management. Disposition  Final diagnoses:   Abdominal pain     Time reflects when diagnosis was documented in both MDM as applicable and the Disposition within this note     Time User Action Codes Description Comment    9/25/2023  2:58 PM Valentino Or Add [R10.9] Abdominal pain       ED Disposition     ED Disposition   Discharge    Condition   Stable    Date/Time   Mon Sep 25, 2023  2:21 PM    [address] [place] [address] discharge to home/self care.                Follow-up Information     Follow up With Specialties Details Why Contact Info Additional [address] [facility] Emergency Department Emergency Medicine  If symptoms worsen [address] [address]  [address] [facility] Emergency Department, [address] [facility] [address], [place], [address]          Discharge Medication List as of 9/25/2023  2:59 PM      CONTINUE these medications which have NOT CHANGED    Details   acetaminophen (TYLENOL) 325 mg tablet Take 2 tablets (650 mg total) by mouth every 6 (six) hours as needed for mild pain, headaches or fever, Starting Tue 1/24/2023, No Print      ALPRAZolam (XANAX) 0.5 mg tablet 1-2 po q8hrs prn anxiety, avoid use with opioids, Normal      diphenoxylate-atropine (LOMOTIL) 2.5-0.025 mg per tablet Take 2 tablets by mouth, Starting Mon 2/20/2023, Historical Med      HYDROmorphone (DILAUDID) 2 mg tablet Starting Mon 5/1/2023, Historical Med      loperamide (IMODIUM) 2 mg capsule Take 1 capsule (2 mg total) by mouth 3 (three) times a day before meals Do not start before March 16, 2023., Starting Thu 3/16/2023, Normal      temazepam (RESTORIL) 7.5 mg capsule Take 1 capsule (7.5 mg total) by mouth daily at bedtime as needed for sleep Avoid use with xanax and opioids, Starting Thu 5/18/2023, Normal             No discharge procedures on file.     PDMP Review       Value Time User    PDMP Reviewed  Yes 8/10/2023 11:56 PM Hilario Hunter DO          ED Provider  Electronically Signed by           Mariluz Cristobal DO  09/26/23 4597

## 2023-09-27 ENCOUNTER — APPOINTMENT (EMERGENCY)
Dept: RADIOLOGY | Facility: HOSPITAL | Age: 28
End: 2023-09-27
Payer: COMMERCIAL

## 2023-09-27 ENCOUNTER — HOSPITAL ENCOUNTER (EMERGENCY)
Facility: HOSPITAL | Age: 28
Discharge: HOME | End: 2023-09-27
Attending: EMERGENCY MEDICINE
Payer: COMMERCIAL

## 2023-09-27 VITALS
TEMPERATURE: 98.7 F | DIASTOLIC BLOOD PRESSURE: 76 MMHG | OXYGEN SATURATION: 96 % | HEART RATE: 76 BPM | SYSTOLIC BLOOD PRESSURE: 125 MMHG | RESPIRATION RATE: 20 BRPM

## 2023-09-27 DIAGNOSIS — R10.84 GENERALIZED ABDOMINAL PAIN: Primary | ICD-10-CM

## 2023-09-27 LAB
ALBUMIN SERPL BCP-MCNC: 4.5 G/DL (ref 3.5–5)
ALP SERPL-CCNC: 123 U/L (ref 34–104)
ALT SERPL W P-5'-P-CCNC: 34 U/L (ref 7–52)
ANION GAP SERPL CALCULATED.3IONS-SCNC: 6 MMOL/L
AST SERPL W P-5'-P-CCNC: 21 U/L (ref 13–39)
BASOPHILS # BLD AUTO: 0.14 THOUSANDS/ÂΜL (ref 0–0.1)
BASOPHILS NFR BLD AUTO: 1 % (ref 0–1)
BILIRUB SERPL-MCNC: 0.74 MG/DL (ref 0.2–1)
BUN SERPL-MCNC: 5 MG/DL (ref 5–25)
CALCIUM SERPL-MCNC: 9.8 MG/DL (ref 8.4–10.2)
CHLORIDE SERPL-SCNC: 104 MMOL/L (ref 96–108)
CO2 SERPL-SCNC: 27 MMOL/L (ref 21–32)
CREAT SERPL-MCNC: 0.94 MG/DL (ref 0.6–1.3)
EOSINOPHIL # BLD AUTO: 0.14 THOUSAND/ÂΜL (ref 0–0.61)
EOSINOPHIL NFR BLD AUTO: 1 % (ref 0–6)
ERYTHROCYTE [DISTWIDTH] IN BLOOD BY AUTOMATED COUNT: 18.8 % (ref 11.6–15.1)
GFR SERPL CREATININE-BSD FRML MDRD: 108 ML/MIN/1.73SQ M
GLUCOSE SERPL-MCNC: 75 MG/DL (ref 65–140)
HCT VFR BLD AUTO: 38.3 % (ref 36.5–49.3)
HGB BLD-MCNC: 11.5 G/DL (ref 12–17)
IMM GRANULOCYTES # BLD AUTO: 0.06 THOUSAND/UL (ref 0–0.2)
IMM GRANULOCYTES NFR BLD AUTO: 0 % (ref 0–2)
LIPASE SERPL-CCNC: 26 U/L (ref 11–82)
LYMPHOCYTES # BLD AUTO: 1.82 THOUSANDS/ÂΜL (ref 0.6–4.47)
LYMPHOCYTES NFR BLD AUTO: 12 % (ref 14–44)
MCH RBC QN AUTO: 19 PG (ref 26.8–34.3)
MCHC RBC AUTO-ENTMCNC: 30 G/DL (ref 31.4–37.4)
MCV RBC AUTO: 63 FL (ref 82–98)
MONOCYTES # BLD AUTO: 0.66 THOUSAND/ÂΜL (ref 0.17–1.22)
MONOCYTES NFR BLD AUTO: 5 % (ref 4–12)
NEUTROPHILS # BLD AUTO: 11.85 THOUSANDS/ÂΜL (ref 1.85–7.62)
NEUTS SEG NFR BLD AUTO: 81 % (ref 43–75)
NRBC BLD AUTO-RTO: 0 /100 WBCS
PLATELET # BLD AUTO: 482 THOUSANDS/UL (ref 149–390)
PMV BLD AUTO: 8.4 FL (ref 8.9–12.7)
POTASSIUM SERPL-SCNC: 3.7 MMOL/L (ref 3.5–5.3)
PROT SERPL-MCNC: 8.2 G/DL (ref 6.4–8.4)
RBC # BLD AUTO: 6.04 MILLION/UL (ref 3.88–5.62)
SODIUM SERPL-SCNC: 137 MMOL/L (ref 135–147)
WBC # BLD AUTO: 14.67 THOUSAND/UL (ref 4.31–10.16)

## 2023-09-27 PROCEDURE — G1004 CDSM NDSC: HCPCS

## 2023-09-27 PROCEDURE — 74177 CT ABD & PELVIS W/CONTRAST: CPT

## 2023-09-27 PROCEDURE — 83690 ASSAY OF LIPASE: CPT | Performed by: EMERGENCY MEDICINE

## 2023-09-27 PROCEDURE — 96374 THER/PROPH/DIAG INJ IV PUSH: CPT

## 2023-09-27 PROCEDURE — 36415 COLL VENOUS BLD VENIPUNCTURE: CPT | Performed by: EMERGENCY MEDICINE

## 2023-09-27 PROCEDURE — 96375 TX/PRO/DX INJ NEW DRUG ADDON: CPT

## 2023-09-27 PROCEDURE — 80053 COMPREHEN METABOLIC PANEL: CPT | Performed by: EMERGENCY MEDICINE

## 2023-09-27 PROCEDURE — 99284 EMERGENCY DEPT VISIT MOD MDM: CPT

## 2023-09-27 PROCEDURE — 99284 EMERGENCY DEPT VISIT MOD MDM: CPT | Performed by: EMERGENCY MEDICINE

## 2023-09-27 PROCEDURE — 96361 HYDRATE IV INFUSION ADD-ON: CPT

## 2023-09-27 PROCEDURE — 85025 COMPLETE CBC W/AUTO DIFF WBC: CPT | Performed by: EMERGENCY MEDICINE

## 2023-09-27 RX ORDER — ONDANSETRON 2 MG/ML
4 INJECTION INTRAMUSCULAR; INTRAVENOUS ONCE
Status: COMPLETED | OUTPATIENT
Start: 2023-09-27 | End: 2023-09-27

## 2023-09-27 RX ORDER — METRONIDAZOLE 500 MG/1
500 TABLET ORAL EVERY 12 HOURS SCHEDULED
Qty: 10 TABLET | Refills: 0 | Status: SHIPPED | OUTPATIENT
Start: 2023-09-27 | End: 2023-10-02

## 2023-09-27 RX ORDER — CIPROFLOXACIN 500 MG/1
500 TABLET, FILM COATED ORAL 2 TIMES DAILY
Qty: 14 TABLET | Refills: 0 | Status: SHIPPED | OUTPATIENT
Start: 2023-09-27 | End: 2023-10-02

## 2023-09-27 RX ORDER — METRONIDAZOLE 500 MG/1
500 TABLET ORAL ONCE
Status: COMPLETED | OUTPATIENT
Start: 2023-09-27 | End: 2023-09-27

## 2023-09-27 RX ORDER — CIPROFLOXACIN 500 MG/1
500 TABLET, FILM COATED ORAL ONCE
Status: COMPLETED | OUTPATIENT
Start: 2023-09-27 | End: 2023-09-27

## 2023-09-27 RX ORDER — KETOROLAC TROMETHAMINE 30 MG/ML
15 INJECTION, SOLUTION INTRAMUSCULAR; INTRAVENOUS ONCE
Status: COMPLETED | OUTPATIENT
Start: 2023-09-27 | End: 2023-09-27

## 2023-09-27 RX ORDER — MAGNESIUM HYDROXIDE/ALUMINUM HYDROXICE/SIMETHICONE 120; 1200; 1200 MG/30ML; MG/30ML; MG/30ML
30 SUSPENSION ORAL ONCE
Status: COMPLETED | OUTPATIENT
Start: 2023-09-27 | End: 2023-09-27

## 2023-09-27 RX ORDER — HYDROMORPHONE HCL/PF 1 MG/ML
1 SYRINGE (ML) INJECTION ONCE
Status: COMPLETED | OUTPATIENT
Start: 2023-09-27 | End: 2023-09-27

## 2023-09-27 RX ADMIN — CIPROFLOXACIN HYDROCHLORIDE 500 MG: 500 TABLET, FILM COATED ORAL at 19:35

## 2023-09-27 RX ADMIN — SODIUM CHLORIDE 1000 ML: 0.9 INJECTION, SOLUTION INTRAVENOUS at 13:57

## 2023-09-27 RX ADMIN — METRONIDAZOLE 500 MG: 500 TABLET ORAL at 19:35

## 2023-09-27 RX ADMIN — IOHEXOL 50 ML: 240 INJECTION, SOLUTION INTRATHECAL; INTRAVASCULAR; INTRAVENOUS; ORAL at 15:01

## 2023-09-27 RX ADMIN — KETOROLAC TROMETHAMINE 15 MG: 30 INJECTION, SOLUTION INTRAMUSCULAR; INTRAVENOUS at 14:12

## 2023-09-27 RX ADMIN — HYDROMORPHONE HYDROCHLORIDE 1 MG: 1 INJECTION, SOLUTION INTRAMUSCULAR; INTRAVENOUS; SUBCUTANEOUS at 17:17

## 2023-09-27 RX ADMIN — ALUMINUM HYDROXIDE, MAGNESIUM HYDROXIDE, AND DIMETHICONE 30 ML: 200; 20; 200 SUSPENSION ORAL at 14:11

## 2023-09-27 RX ADMIN — IOHEXOL 100 ML: 350 INJECTION, SOLUTION INTRAVENOUS at 16:44

## 2023-09-27 RX ADMIN — ONDANSETRON 4 MG: 2 INJECTION INTRAMUSCULAR; INTRAVENOUS at 15:29

## 2023-09-27 NOTE — ED PROVIDER NOTES
History  Chief Complaint   Patient presents with   • Abdominal Pain     Pt c/o lower abdom pain 8/10. Hx failed J pouch, colitis     49-year-old male presents to the ED with 8 out of 10 abdominal pain. Patient has a history of a failed J-pouch colitis. He has had numerous surgeries on this. Patient is awake and alert. Did have a similar episode 2 days ago where he came into the ED they did a CT abdomen pelvis with contrast and IV fluids went home after that took a energy boost drink and did some walking and the patient was able to move more stool into his pouch and states his pain was relieved. Today he states increased pain again in the same region he has contacted his surgeon who has increased his placement of his stent next week. Patient states that he was told that he has a narrowing in his bowel and stools having difficulty getting through the area which caused increased pain. CT scan at that point showed no obvious obstruction. When patient notified his surgeon in [place] this morning they advised to get another CAT scan if no obstruction to return home and wait for his surgery on the fifth. Prior to Admission Medications   Prescriptions Last Dose Informant Patient Reported? Taking? ALPRAZolam (XANAX) 0.5 mg tablet   No No   Si-2 po q8hrs prn anxiety, avoid use with opioids   HYDROmorphone (DILAUDID) 2 mg tablet  Self Yes No   Patient not taking: Reported on 2023   acetaminophen (TYLENOL) 325 mg tablet  Self No No   Sig: Take 2 tablets (650 mg total) by mouth every 6 (six) hours as needed for mild pain, headaches or fever   diphenoxylate-atropine (LOMOTIL) 2.5-0.025 mg per tablet  Self Yes No   Sig: Take 2 tablets by mouth   Patient not taking: Reported on 2023   loperamide (IMODIUM) 2 mg capsule  Self No No   Sig: Take 1 capsule (2 mg total) by mouth 3 (three) times a day before meals Do not start before 2023.    temazepam (RESTORIL) 7.5 mg capsule   No No Sig: Take 1 capsule (7.5 mg total) by mouth daily at bedtime as needed for sleep Avoid use with xanax and opioids      Facility-Administered Medications: None       Past Medical History:   Diagnosis Date   • Ankylosing spondylitis (HCC)    • Anxiety    • Bowel obstruction (HCC)    • Clostridium difficile colitis 9/13/2018   • Colitis    • 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[address]) 9/13/2018   • Pancreatitis    • Ulcerative colitis ([address])        Past Surgical History:   Procedure Laterality Date   • COLECTOMY TOTAL      with ileal pouch and anastemosis   • IR PICC PLACEMENT SINGLE LUMEN  3/1/2022   • TOTAL COLECTOMY         Family History   Problem Relation Age of Onset   • Lung disease Neg Hx      I have reviewed and agree with the history as documented. E-Cigarette/Vaping   • E-Cigarette Use Never User      E-Cigarette/Vaping Substances   • Nicotine No    • THC No    • CBD No    • Flavoring No    • Other No    • Unknown No      Social History     Tobacco Use   • Smoking status: Never   • Smokeless tobacco: Never   Vaping Use   • Vaping Use: Never used   Substance Use Topics   • Alcohol use: Not Currently     Comment: pt states 5 a month/socially   • Drug use: No       Review of Systems   Constitutional: Negative for activity change, chills, diaphoresis and fever. HENT: Negative for congestion, ear pain, nosebleeds, sore throat, trouble swallowing and voice change. Eyes: Negative for pain, discharge and redness. Respiratory: Negative for apnea, cough, choking, shortness of breath, wheezing and stridor. Cardiovascular: Negative for chest pain and palpitations. Gastrointestinal: Positive for abdominal pain and nausea. Negative for abdominal distention, constipation, diarrhea and vomiting. Endocrine: Negative for polydipsia. Genitourinary: Negative for difficulty urinating, dysuria, flank pain, frequency, hematuria and urgency.    Musculoskeletal: Negative for back pain, gait problem, joint swelling, myalgias, neck pain and neck stiffness. Skin: Negative for pallor and rash. Neurological: Negative for dizziness, tremors, syncope, speech difficulty, weakness, numbness and headaches. Hematological: Negative for adenopathy. Psychiatric/Behavioral: Negative for confusion, hallucinations, self-injury and suicidal ideas. The patient is not nervous/anxious. Physical Exam  Physical Exam  Vitals and nursing note reviewed. Constitutional:       General: He is not in acute distress. Appearance: He is well-developed. He is not diaphoretic. HENT:      Head: Normocephalic and atraumatic. Right Ear: External ear normal.      Left Ear: External ear normal.      Nose: Nose normal.   Eyes:      Conjunctiva/sclera: Conjunctivae normal.      Pupils: Pupils are equal, round, and reactive to light. Cardiovascular:      Rate and Rhythm: Normal rate and regular rhythm. Heart sounds: Normal heart sounds. Pulmonary:      Effort: Pulmonary effort is normal.      Breath sounds: Normal breath sounds. Abdominal:      General: Bowel sounds are normal.      Palpations: Abdomen is soft. Comments: Patient has diffuse abdominal discomfort he appears to feel more distention in the upper part of his abdomen above his colostomy bag. He states he also has pain and what he feels is a hardened area just below his pouch. Musculoskeletal:         General: Normal range of motion. Cervical back: Normal range of motion and neck supple. Skin:     General: Skin is warm and dry. Neurological:      Mental Status: He is alert and oriented to person, place, and time. Deep Tendon Reflexes: Reflexes are normal and symmetric.          Vital Signs  ED Triage Vitals [09/27/23 1337]   Temperature Pulse Respirations Blood Pressure SpO2   98.7 °F (37.1 °C) 76 20 125/76 96 %      Temp Source Heart Rate Source Patient Position - Orthostatic VS BP Location FiO2 (%)   Oral Monitor Sitting Right arm --      Pain Score       8 Vitals:    09/27/23 1337   BP: 125/76   Pulse: 76   Patient Position - Orthostatic VS: Sitting         Visual Acuity      ED Medications  Medications   ciprofloxacin (CIPRO) tablet 500 mg (has no administration in time range)   metroNIDAZOLE (FLAGYL) tablet 500 mg (has no administration in time range)   sodium chloride 0.9 % bolus 1,000 mL (0 mL Intravenous Stopped 9/27/23 1457)   ketorolac (TORADOL) injection 15 mg (15 mg Intravenous Given 9/27/23 1412)   aluminum-magnesium hydroxide-simethicone (MAALOX) oral suspension 30 mL (30 mL Oral Given 9/27/23 1411)   iohexol (OMNIPAQUE) 240 MG/ML solution 50 mL (50 mL Oral Given 9/27/23 1501)   ondansetron (ZOFRAN) injection 4 mg (4 mg Intravenous Given 9/27/23 1529)   iohexol (OMNIPAQUE) 350 MG/ML injection (SINGLE-DOSE) 100 mL (100 mL Intravenous Given 9/27/23 1644)   HYDROmorphone (DILAUDID) injection 1 mg (1 mg Intravenous Given 9/27/23 1717)       Diagnostic Studies  Results Reviewed     Procedure Component Value Units Date/Time    Comprehensive metabolic panel [[id number]]  (Abnormal) Collected: 09/27/23 1355    Lab Status: Final result Specimen: Blood from Arm, Right Updated: 09/27/23 1422     Sodium 137 mmol/L      Potassium 3.7 mmol/L      Chloride 104 mmol/L      CO2 27 mmol/L      ANION GAP 6 mmol/L      BUN 5 mg/dL      Creatinine 0.94 mg/dL      Glucose 75 mg/dL      Calcium 9.8 mg/dL      AST 21 U/L      ALT 34 U/L      Alkaline Phosphatase 123 U/L      Total Protein 8.2 g/dL      Albumin 4.5 g/dL      Total Bilirubin 0.74 mg/dL      eGFR 108 ml/min/1.73sq m     Narrative:      [place] guidelines for Chronic Kidney Disease (CKD):   •  Stage 1 with normal or high GFR (GFR > 90 mL/min/1.73 square meters)  •  Stage 2 Mild CKD (GFR = 60-89 mL/min/1.73 square meters)  •  Stage 3A Moderate CKD (GFR = 45-59 mL/min/1.73 square meters)  •  Stage 3B Moderate CKD (GFR = 30-44 mL/min/1.73 square meters)  •  Stage 4 Severe CKD (GFR = 15-29 mL/min/1.73 square meters)  •  Stage 5 End Stage CKD (GFR <15 mL/min/1.73 square meters)  Note: GFR calculation is accurate only with a steady state creatinine    Lipase [[id number]]  (Normal) Collected: 09/27/23 1355    Lab Status: Final result Specimen: Blood from Arm, Right Updated: 09/27/23 1422     Lipase 26 u/L     CBC and differential [[id number]]  (Abnormal) Collected: 09/27/23 1355    Lab Status: Final result Specimen: Blood from Arm, Right Updated: 09/27/23 1403     WBC 14.67 Thousand/uL      RBC 6.04 Million/uL      Hemoglobin 11.5 g/dL      Hematocrit 38.3 %      MCV 63 fL      MCH 19.0 pg      MCHC 30.0 g/dL      RDW 18.8 %      MPV 8.4 fL      Platelets 639 Thousands/uL      nRBC 0 /100 WBCs      Neutrophils Relative 81 %      Immat GRANS % 0 %      Lymphocytes Relative 12 %      Monocytes Relative 5 %      Eosinophils Relative 1 %      Basophils Relative 1 %      Neutrophils Absolute 11.85 Thousands/µL      Immature Grans Absolute 0.06 Thousand/uL      Lymphocytes Absolute 1.82 Thousands/µL      Monocytes Absolute 0.66 Thousand/µL      Eosinophils Absolute 0.14 Thousand/µL      Basophils Absolute 0.14 Thousands/µL                  CT abdomen pelvis with contrast   Final Result by Christina Staples MD (09/27 1727)      No acute findings; specifically, no bowel obstruction. The oral contrast column has transited through the ostomy. Workstation performed: [id number]                    Procedures  Procedures         ED Course                               SBIRT 22yo+    Flowsheet Row Most Recent Value   Initial Alcohol Screen: US AUDIT-C     1. How often do you have a drink containing alcohol? 0 Filed at: 09/27/2023 1342   2. How many drinks containing alcohol do you have on a typical day you are drinking? 0 Filed at: 09/27/2023 1342   3a. Male UNDER 65: How often do you have five or more drinks on one occasion? 0 Filed at: 09/27/2023 1342   3b. FEMALE Any Age, or MALE 65+:  How often do you have 4 or more drinks on one occassion? 0 Filed at: 09/27/2023 1342   Audit-C Score 0 Filed at: 09/27/2023 1342   ROD: How many times in the past year have you. .. Used an illegal drug or used a prescription medication for non-medical reasons? Never Filed at: 09/27/2023 1342                    Medical Decision Making  77-year-old male with extensive abdominal surgeries in the past presents with abdominal discomfort. Does have a recent history of this and was in the ED 2 days ago with similar which ended up resolving after more stool moved into his pouch. Amount and/or Complexity of Data Reviewed  Labs: ordered. Radiology: ordered. Details: We will order another CT scan of the abdomen pelvis with p.o. and IV contrast at the request of his surgeon at [address] [facility]. Prescription drug management. Disposition  Final diagnoses:   Generalized abdominal pain     Time reflects when diagnosis was documented in both MDM as applicable and the Disposition within this note     Time User Action Codes Description Comment    9/27/2023  7:28 PM Prema Goodrich Add [R10.84] Generalized abdominal pain       ED Disposition     ED Disposition   Discharge    Condition   Stable    Date/Time   Wed Sep 27, 2023  7:31 PM    Comment   Greg Horton discharge to home/self care.                Follow-up Information     Follow up With Specialties Details Why Cristina Patten DO Family Medicine Schedule an appointment as soon as possible for a visit  As needed [facility]  [phone]            Patient's Medications   Discharge Prescriptions    CIPROFLOXACIN (CIPRO) 500 MG TABLET    Take 1 tablet (500 mg total) by mouth 2 (two) times a day for 5 days       Start Date: 9/27/2023 End Date: 10/2/2023       Order Dose: 500 mg       Quantity: 14 tablet    Refills: 0    METRONIDAZOLE (FLAGYL) 500 MG TABLET    Take 1 tablet (500 mg total) by mouth every 12 (twelve) hours for 5 days       Start Date: 9/27/2023 End Date: 10/2/2023       Order Dose: 500 mg       Quantity: 10 tablet    Refills: 0       No discharge procedures on file.     PDMP Review       Value Time User    PDMP Reviewed  Yes 8/10/2023 11:56 PM Cody Merida DO          ED Provider  Electronically Signed by           Corrinne Rives, DO  09/27/23 1932

## 2023-10-03 NOTE — PLAN OF CARE
Problem: PAIN - ADULT  Goal: Verbalizes/displays adequate comfort level or baseline comfort level  Description: Interventions:  - Encourage patient to monitor pain and request assistance  - Assess pain using appropriate pain scale  - Administer analgesics based on type and severity of pain and evaluate response  - Implement non-pharmacological measures as appropriate and evaluate response  - Consider cultural and social influences on pain and pain management  - Notify physician/advanced practitioner if interventions unsuccessful or patient reports new pain  Outcome: Progressing     Problem: INFECTION - ADULT  Goal: Absence or prevention of progression during hospitalization  Description: INTERVENTIONS:  - Assess and monitor for signs and symptoms of infection  - Monitor lab/diagnostic results  - Monitor all insertion sites, i e  indwelling lines, tubes, and drains  - Monitor endotracheal if appropriate and nasal secretions for changes in amount and color  - [place] appropriate cooling/warming therapies per order  - Administer medications as ordered  - Instruct and encourage patient and family to use good hand hygiene technique  - Identify and instruct in appropriate isolation precautions for identified infection/condition  Outcome: Progressing  Goal: Absence of fever/infection during neutropenic period  Description: INTERVENTIONS:  - Monitor WBC    Outcome: Progressing     Problem: SAFETY ADULT  Goal: Patient will remain free of falls  Description: INTERVENTIONS:  - Educate patient/family on patient safety including physical limitations  - Instruct patient to call for assistance with activity   - Consult OT/PT to assist with strengthening/mobility   - Keep Call bell within reach  - Keep bed low and locked with side rails adjusted as appropriate  - Keep care items and personal belongings within reach  - Initiate and maintain comfort rounds  - Make Fall Risk Sign visible to staff  - Offer Toileting every 2 Hours, in advance of need  - Initiate/Maintain bed alarm  - Obtain necessary fall risk management equipment: fall risk   - Apply yellow socks and bracelet for high fall risk patients  - Consider moving patient to room near nurses station  Outcome: Progressing  Goal: Maintain or return to baseline ADL function  Description: INTERVENTIONS:  -  Assess patient's ability to carry out ADLs; assess patient's baseline for ADL function and identify physical deficits which impact ability to perform ADLs (bathing, care of mouth/teeth, toileting, grooming, dressing, etc )  - Assess/evaluate cause of self-care deficits   - Assess range of motion  - Assess patient's mobility; develop plan if impaired  - Assess patient's need for assistive devices and provide as appropriate  - Encourage maximum independence but intervene and supervise when necessary  - Involve family in performance of ADLs  - Assess for home care needs following discharge   - Consider OT consult to assist with ADL evaluation and planning for discharge  - Provide patient education as appropriate  Outcome: Progressing  Goal: Maintains/Returns to pre admission functional level  Description: INTERVENTIONS:  - Perform BMAT or MOVE assessment daily    - Set and communicate daily mobility goal to care team and patient/family/caregiver  - Collaborate with rehabilitation services on mobility goals if consulted  - Perform Range of Motion 2 times a day  - Reposition patient every 2 hours    - Dangle patient 2 times a day  - Stand patient 2 times a day  - Ambulate patient 2 times a day  - Out of bed to chair 2 times a day   - Out of bed for meals 2 times a day  - Out of bed for toileting  - Record patient progress and toleration of activity level   Outcome: Progressing     Problem: DISCHARGE PLANNING  Goal: Discharge to home or other facility with appropriate resources  Description: INTERVENTIONS:  - Identify barriers to discharge w/patient and caregiver  - Arrange for needed discharge resources and transportation as appropriate  - Identify discharge learning needs (meds, wound care, etc )  - Arrange for interpretive services to assist at discharge as needed  - Refer to Case Management Department for coordinating discharge planning if the patient needs post-hospital services based on physician/advanced practitioner order or complex needs related to functional status, cognitive ability, or social support system  Outcome: Progressing     Problem: Knowledge Deficit  Goal: Patient/family/caregiver demonstrates understanding of disease process, treatment plan, medications, and discharge instructions  Description: Complete learning assessment and assess knowledge base    Interventions:  - Provide teaching at level of understanding  - Provide teaching via preferred learning methods  Outcome: Progressing Initiate Treatment: Clindamycin swab for face and back\\nTretinion .025 cream on the face QHS Render In Strict Bullet Format?: No Detail Level: Zone

## 2023-10-05 ENCOUNTER — APPOINTMENT (EMERGENCY)
Dept: RADIOLOGY | Facility: HOSPITAL | Age: 28
End: 2023-10-05
Payer: COMMERCIAL

## 2023-10-05 ENCOUNTER — HOSPITAL ENCOUNTER (OUTPATIENT)
Facility: HOSPITAL | Age: 28
Setting detail: OBSERVATION
Discharge: ANOTHER HOSPITAL | End: 2023-10-06
Attending: EMERGENCY MEDICINE | Admitting: FAMILY MEDICINE
Payer: COMMERCIAL

## 2023-10-05 DIAGNOSIS — K66.8 PNEUMOPERITONEUM: ICD-10-CM

## 2023-10-05 DIAGNOSIS — R10.9 ABDOMINAL PAIN: Primary | ICD-10-CM

## 2023-10-05 LAB
ALBUMIN SERPL BCP-MCNC: 4.7 G/DL (ref 3.5–5)
ALP SERPL-CCNC: 108 U/L (ref 34–104)
ALT SERPL W P-5'-P-CCNC: 34 U/L (ref 7–52)
ANION GAP SERPL CALCULATED.3IONS-SCNC: 11 MMOL/L
AST SERPL W P-5'-P-CCNC: 23 U/L (ref 13–39)
BASO STIPL BLD QL SMEAR: PRESENT
BASOPHILS # BLD MANUAL: 0 THOUSAND/UL (ref 0–0.1)
BASOPHILS NFR MAR MANUAL: 0 % (ref 0–1)
BILIRUB SERPL-MCNC: 1.17 MG/DL (ref 0.2–1)
BUN SERPL-MCNC: 15 MG/DL (ref 5–25)
CALCIUM SERPL-MCNC: 9.9 MG/DL (ref 8.4–10.2)
CHLORIDE SERPL-SCNC: 100 MMOL/L (ref 96–108)
CO2 SERPL-SCNC: 23 MMOL/L (ref 21–32)
CREAT SERPL-MCNC: 1.1 MG/DL (ref 0.6–1.3)
DACRYOCYTES BLD QL SMEAR: PRESENT
EOSINOPHIL # BLD MANUAL: 0 THOUSAND/UL (ref 0–0.4)
EOSINOPHIL NFR BLD MANUAL: 0 % (ref 0–6)
ERYTHROCYTE [DISTWIDTH] IN BLOOD BY AUTOMATED COUNT: 18.1 % (ref 11.6–15.1)
GFR SERPL CREATININE-BSD FRML MDRD: 89 ML/MIN/1.73SQ M
GLUCOSE SERPL-MCNC: 113 MG/DL (ref 65–140)
HCT VFR BLD AUTO: 39.4 % (ref 36.5–49.3)
HGB BLD-MCNC: 12.3 G/DL (ref 12–17)
HYPERCHROMIA BLD QL SMEAR: PRESENT
LACTATE SERPL-SCNC: 2.2 MMOL/L (ref 0.5–2)
LIPASE SERPL-CCNC: 16 U/L (ref 11–82)
LYMPHOCYTES # BLD AUTO: 1.5 THOUSAND/UL (ref 0.6–4.47)
LYMPHOCYTES # BLD AUTO: 6 % (ref 14–44)
MCH RBC QN AUTO: 19.4 PG (ref 26.8–34.3)
MCHC RBC AUTO-ENTMCNC: 31.2 G/DL (ref 31.4–37.4)
MCV RBC AUTO: 62 FL (ref 82–98)
MICROCYTES BLD QL AUTO: PRESENT
MONOCYTES # BLD AUTO: 0.75 THOUSAND/UL (ref 0–1.22)
MONOCYTES NFR BLD: 3 % (ref 4–12)
NEUTROPHILS # BLD MANUAL: 22.79 THOUSAND/UL (ref 1.85–7.62)
NEUTS BAND NFR BLD MANUAL: 1 % (ref 0–8)
NEUTS SEG NFR BLD AUTO: 90 % (ref 43–75)
OVALOCYTES BLD QL SMEAR: PRESENT
PLATELET # BLD AUTO: 435 THOUSANDS/UL (ref 149–390)
PLATELET BLD QL SMEAR: ADEQUATE
PMV BLD AUTO: 8.4 FL (ref 8.9–12.7)
POTASSIUM SERPL-SCNC: 3.7 MMOL/L (ref 3.5–5.3)
PROT SERPL-MCNC: 8.1 G/DL (ref 6.4–8.4)
RBC # BLD AUTO: 6.33 MILLION/UL (ref 3.88–5.62)
RBC MORPH BLD: PRESENT
SODIUM SERPL-SCNC: 134 MMOL/L (ref 135–147)
STOMATOCYTES BLD QL SMEAR: PRESENT
WBC # BLD AUTO: 25.04 THOUSAND/UL (ref 4.31–10.16)

## 2023-10-05 PROCEDURE — 96361 HYDRATE IV INFUSION ADD-ON: CPT

## 2023-10-05 PROCEDURE — 96365 THER/PROPH/DIAG IV INF INIT: CPT

## 2023-10-05 PROCEDURE — 87040 BLOOD CULTURE FOR BACTERIA: CPT | Performed by: EMERGENCY MEDICINE

## 2023-10-05 PROCEDURE — 99284 EMERGENCY DEPT VISIT MOD MDM: CPT

## 2023-10-05 PROCEDURE — G1004 CDSM NDSC: HCPCS

## 2023-10-05 PROCEDURE — 80053 COMPREHEN METABOLIC PANEL: CPT | Performed by: EMERGENCY MEDICINE

## 2023-10-05 PROCEDURE — 74177 CT ABD & PELVIS W/CONTRAST: CPT

## 2023-10-05 PROCEDURE — 85027 COMPLETE CBC AUTOMATED: CPT | Performed by: EMERGENCY MEDICINE

## 2023-10-05 PROCEDURE — 85007 BL SMEAR W/DIFF WBC COUNT: CPT | Performed by: EMERGENCY MEDICINE

## 2023-10-05 PROCEDURE — 96375 TX/PRO/DX INJ NEW DRUG ADDON: CPT

## 2023-10-05 PROCEDURE — 83690 ASSAY OF LIPASE: CPT | Performed by: EMERGENCY MEDICINE

## 2023-10-05 PROCEDURE — 36415 COLL VENOUS BLD VENIPUNCTURE: CPT

## 2023-10-05 PROCEDURE — 93005 ELECTROCARDIOGRAM TRACING: CPT

## 2023-10-05 PROCEDURE — 83605 ASSAY OF LACTIC ACID: CPT | Performed by: EMERGENCY MEDICINE

## 2023-10-05 RX ORDER — CEFEPIME HYDROCHLORIDE 2 G/50ML
2000 INJECTION, SOLUTION INTRAVENOUS ONCE
Status: COMPLETED | OUTPATIENT
Start: 2023-10-05 | End: 2023-10-06

## 2023-10-05 RX ORDER — ONDANSETRON 2 MG/ML
4 INJECTION INTRAMUSCULAR; INTRAVENOUS ONCE
Status: COMPLETED | OUTPATIENT
Start: 2023-10-05 | End: 2023-10-05

## 2023-10-05 RX ORDER — HYDROMORPHONE HYDROCHLORIDE 2 MG/ML
2 INJECTION, SOLUTION INTRAMUSCULAR; INTRAVENOUS; SUBCUTANEOUS ONCE
Status: COMPLETED | OUTPATIENT
Start: 2023-10-05 | End: 2023-10-05

## 2023-10-05 RX ORDER — HYDROMORPHONE HCL/PF 1 MG/ML
1 SYRINGE (ML) INJECTION ONCE
Status: DISCONTINUED | OUTPATIENT
Start: 2023-10-05 | End: 2023-10-05

## 2023-10-05 RX ORDER — ONDANSETRON 2 MG/ML
INJECTION INTRAMUSCULAR; INTRAVENOUS
Status: COMPLETED
Start: 2023-10-05 | End: 2023-10-05

## 2023-10-05 RX ADMIN — ONDANSETRON 4 MG: 2 INJECTION INTRAMUSCULAR; INTRAVENOUS at 22:20

## 2023-10-05 RX ADMIN — CEFEPIME HYDROCHLORIDE 2000 MG: 2 INJECTION, SOLUTION INTRAVENOUS at 23:47

## 2023-10-05 RX ADMIN — HYDROMORPHONE HYDROCHLORIDE 2 MG: 2 INJECTION, SOLUTION INTRAMUSCULAR; INTRAVENOUS; SUBCUTANEOUS at 22:19

## 2023-10-05 RX ADMIN — IOHEXOL 100 ML: 350 INJECTION, SOLUTION INTRAVENOUS at 23:42

## 2023-10-05 RX ADMIN — SODIUM CHLORIDE 1000 ML: 0.9 INJECTION, SOLUTION INTRAVENOUS at 22:19

## 2023-10-06 VITALS
OXYGEN SATURATION: 96 % | SYSTOLIC BLOOD PRESSURE: 133 MMHG | RESPIRATION RATE: 18 BRPM | BODY MASS INDEX: 25.18 KG/M2 | HEIGHT: 69 IN | DIASTOLIC BLOOD PRESSURE: 73 MMHG | HEART RATE: 68 BPM | TEMPERATURE: 98.4 F | WEIGHT: 170 LBS

## 2023-10-06 PROBLEM — K66.8 PNEUMOPERITONEUM: Status: ACTIVE | Noted: 2023-10-06

## 2023-10-06 PROBLEM — K91.30 COLORECTAL ANASTOMOTIC STRICTURE: Status: ACTIVE | Noted: 2023-10-06

## 2023-10-06 LAB
LACTATE SERPL-SCNC: 1.1 MMOL/L (ref 0.5–2)
SARS-COV-2 RNA RESP QL NAA+PROBE: POSITIVE

## 2023-10-06 PROCEDURE — 96367 TX/PROPH/DG ADDL SEQ IV INF: CPT

## 2023-10-06 PROCEDURE — 96366 THER/PROPH/DIAG IV INF ADDON: CPT

## 2023-10-06 PROCEDURE — 36415 COLL VENOUS BLD VENIPUNCTURE: CPT | Performed by: EMERGENCY MEDICINE

## 2023-10-06 PROCEDURE — 96376 TX/PRO/DX INJ SAME DRUG ADON: CPT

## 2023-10-06 PROCEDURE — 99223 1ST HOSP IP/OBS HIGH 75: CPT | Performed by: FAMILY MEDICINE

## 2023-10-06 PROCEDURE — 87635 SARS-COV-2 COVID-19 AMP PRB: CPT | Performed by: EMERGENCY MEDICINE

## 2023-10-06 PROCEDURE — 99285 EMERGENCY DEPT VISIT HI MDM: CPT | Performed by: EMERGENCY MEDICINE

## 2023-10-06 PROCEDURE — NC001 PR NO CHARGE: Performed by: FAMILY MEDICINE

## 2023-10-06 PROCEDURE — 83605 ASSAY OF LACTIC ACID: CPT | Performed by: EMERGENCY MEDICINE

## 2023-10-06 RX ORDER — METRONIDAZOLE 500 MG/100ML
500 INJECTION, SOLUTION INTRAVENOUS EVERY 8 HOURS
Refills: 0
Start: 2023-10-07 | End: 2023-10-16

## 2023-10-06 RX ORDER — HYDROMORPHONE HYDROCHLORIDE 2 MG/ML
2 INJECTION, SOLUTION INTRAMUSCULAR; INTRAVENOUS; SUBCUTANEOUS ONCE
Status: COMPLETED | OUTPATIENT
Start: 2023-10-06 | End: 2023-10-06

## 2023-10-06 RX ORDER — SODIUM CHLORIDE, SODIUM LACTATE, POTASSIUM CHLORIDE, CALCIUM CHLORIDE 600; 310; 30; 20 MG/100ML; MG/100ML; MG/100ML; MG/100ML
125 INJECTION, SOLUTION INTRAVENOUS CONTINUOUS
Refills: 0
Start: 2023-10-06

## 2023-10-06 RX ORDER — METRONIDAZOLE 500 MG/100ML
500 INJECTION, SOLUTION INTRAVENOUS EVERY 8 HOURS
Status: DISCONTINUED | OUTPATIENT
Start: 2023-10-06 | End: 2023-10-06 | Stop reason: HOSPADM

## 2023-10-06 RX ORDER — HYDROMORPHONE HCL/PF 1 MG/ML
1 SYRINGE (ML) INJECTION
Status: DISCONTINUED | OUTPATIENT
Start: 2023-10-06 | End: 2023-10-06 | Stop reason: HOSPADM

## 2023-10-06 RX ORDER — CEFEPIME HYDROCHLORIDE 2 G/1
2 INJECTION, POWDER, FOR SOLUTION INTRAVENOUS EVERY 8 HOURS
Refills: 0
Start: 2023-10-06 | End: 2023-10-06

## 2023-10-06 RX ORDER — OLANZAPINE 5 MG/1
10 TABLET, ORALLY DISINTEGRATING ORAL ONCE
Status: COMPLETED | OUTPATIENT
Start: 2023-10-06 | End: 2023-10-06

## 2023-10-06 RX ORDER — HYDROMORPHONE HCL/PF 1 MG/ML
1 SYRINGE (ML) INJECTION ONCE
Status: DISCONTINUED | OUTPATIENT
Start: 2023-10-06 | End: 2023-10-06

## 2023-10-06 RX ORDER — SODIUM CHLORIDE, SODIUM LACTATE, POTASSIUM CHLORIDE, CALCIUM CHLORIDE 600; 310; 30; 20 MG/100ML; MG/100ML; MG/100ML; MG/100ML
125 INJECTION, SOLUTION INTRAVENOUS CONTINUOUS
Status: DISCONTINUED | OUTPATIENT
Start: 2023-10-06 | End: 2023-10-06 | Stop reason: HOSPADM

## 2023-10-06 RX ORDER — ONDANSETRON 2 MG/ML
4 INJECTION INTRAMUSCULAR; INTRAVENOUS EVERY 6 HOURS PRN
Status: DISCONTINUED | OUTPATIENT
Start: 2023-10-06 | End: 2023-10-06 | Stop reason: HOSPADM

## 2023-10-06 RX ORDER — PROMETHAZINE HYDROCHLORIDE 25 MG/ML
12.5 INJECTION, SOLUTION INTRAMUSCULAR; INTRAVENOUS EVERY 6 HOURS PRN
Status: DISCONTINUED | OUTPATIENT
Start: 2023-10-06 | End: 2023-10-06 | Stop reason: HOSPADM

## 2023-10-06 RX ORDER — HYDROMORPHONE HCL IN WATER/PF 6 MG/30 ML
0.2 PATIENT CONTROLLED ANALGESIA SYRINGE INTRAVENOUS
Status: DISCONTINUED | OUTPATIENT
Start: 2023-10-06 | End: 2023-10-06 | Stop reason: HOSPADM

## 2023-10-06 RX ORDER — ONDANSETRON 2 MG/ML
4 INJECTION INTRAMUSCULAR; INTRAVENOUS ONCE
Status: COMPLETED | OUTPATIENT
Start: 2023-10-06 | End: 2023-10-06

## 2023-10-06 RX ORDER — ONDANSETRON 2 MG/ML
4 INJECTION INTRAMUSCULAR; INTRAVENOUS EVERY 6 HOURS PRN
Refills: 0
Start: 2023-10-06

## 2023-10-06 RX ORDER — HYDROMORPHONE HCL/PF 1 MG/ML
1 SYRINGE (ML) INJECTION EVERY 4 HOURS PRN
Status: DISCONTINUED | OUTPATIENT
Start: 2023-10-06 | End: 2023-10-06

## 2023-10-06 RX ORDER — HYDROMORPHONE HCL/PF 1 MG/ML
0.5 SYRINGE (ML) INJECTION
Status: DISCONTINUED | OUTPATIENT
Start: 2023-10-06 | End: 2023-10-06 | Stop reason: HOSPADM

## 2023-10-06 RX ADMIN — SODIUM CHLORIDE, SODIUM LACTATE, POTASSIUM CHLORIDE, AND CALCIUM CHLORIDE 125 ML/HR: .6; .31; .03; .02 INJECTION, SOLUTION INTRAVENOUS at 12:48

## 2023-10-06 RX ADMIN — HYDROMORPHONE HYDROCHLORIDE 2 MG: 2 INJECTION, SOLUTION INTRAMUSCULAR; INTRAVENOUS; SUBCUTANEOUS at 00:27

## 2023-10-06 RX ADMIN — ONDANSETRON 4 MG: 2 INJECTION INTRAMUSCULAR; INTRAVENOUS at 01:15

## 2023-10-06 RX ADMIN — ONDANSETRON 4 MG: 2 INJECTION INTRAMUSCULAR; INTRAVENOUS at 15:44

## 2023-10-06 RX ADMIN — HYDROMORPHONE HYDROCHLORIDE 1 MG: 1 INJECTION, SOLUTION INTRAMUSCULAR; INTRAVENOUS; SUBCUTANEOUS at 15:44

## 2023-10-06 RX ADMIN — HYDROMORPHONE HYDROCHLORIDE 1 MG: 1 INJECTION, SOLUTION INTRAMUSCULAR; INTRAVENOUS; SUBCUTANEOUS at 11:29

## 2023-10-06 RX ADMIN — CEFEPIME HYDROCHLORIDE 2000 MG: 2 INJECTION, SOLUTION INTRAVENOUS at 11:31

## 2023-10-06 RX ADMIN — VANCOMYCIN HYDROCHLORIDE 2000 MG: 1 INJECTION, POWDER, LYOPHILIZED, FOR SOLUTION INTRAVENOUS at 00:34

## 2023-10-06 RX ADMIN — OLANZAPINE 10 MG: 5 TABLET, ORALLY DISINTEGRATING ORAL at 02:39

## 2023-10-06 RX ADMIN — HYDROMORPHONE HYDROCHLORIDE 2 MG: 2 INJECTION, SOLUTION INTRAMUSCULAR; INTRAVENOUS; SUBCUTANEOUS at 08:27

## 2023-10-06 RX ADMIN — METRONIDAZOLE 500 MG: 500 INJECTION, SOLUTION INTRAVENOUS at 18:20

## 2023-10-06 RX ADMIN — METRONIDAZOLE 500 MG: 500 INJECTION, SOLUTION INTRAVENOUS at 10:08

## 2023-10-06 RX ADMIN — HYDROMORPHONE HYDROCHLORIDE 1 MG: 1 INJECTION, SOLUTION INTRAMUSCULAR; INTRAVENOUS; SUBCUTANEOUS at 18:45

## 2023-10-06 RX ADMIN — PROMETHAZINE HYDROCHLORIDE 12.5 MG: 25 INJECTION INTRAMUSCULAR; INTRAVENOUS at 18:45

## 2023-10-06 NOTE — ASSESSMENT & PLAN NOTE
Patient underwent pouchoscopy with dilatation and stenting on 10/5 at [facility]  Transfer to [facility] once bed available

## 2023-10-06 NOTE — ASSESSMENT & PLAN NOTE
Patient presented to the ER on 10/5 due to 10/10 abdominal pain, fever  · On imaging noted to have findings of pneumoperitoneum tracking within the mesenteric leaflets to the mid abdomen.   · Plan of care was coordinated with the surgical team at [facility] and patient being transferred as bed is now available  · Patient received a dose of IV cefepime and Vanco in the ER  · Will continue with IV cefepime and add Flagyl for anaerobic coverage  · N.p.o., IV fluids, pain medication/nausea medication as needed  · Risks/Benefits of transfer discussed with patient and he is in agreement

## 2023-10-06 NOTE — ASSESSMENT & PLAN NOTE
Patient found to be incidentally positive for COVID-19  · Currently asymptomatic. Stable on room air.   Reports having cold-like symptoms about a month ago  · No indication for treatment

## 2023-10-06 NOTE — H&P
[address]  H&P  Name: China Winston 25 y.o. male I MRN: [id number]  Unit/Bed#: [address] [place] [address] I Date of Admission: 10/5/2023   Date of Service: 10/6/2023 I Hospital Day: 0      Assessment/Plan   * Pneumoperitoneum  Assessment & Plan  Patient presented to the ER on 10/5 due to 10/10 abdominal pain, fever  · On imaging noted to have findings of pneumoperitoneum tracking within the mesenteric leaflets to the mid abdomen  · Plan of care was coordinated with the surgical team at [facility] and plan to transfer her there for further management once bed available  · Patient received a dose of IV cefepime and Vanco in the ER  · We will continue with IV cefepime and add Flagyl for anaerobic coverage  · N.p.o., IV fluids, pain medication/nausea medication as needed    Sepsis ([address])  Assessment & Plan  As noted by tachycardia, leukocytosis  · Lactic acid mildly elevated and normalized  · Repeat lab work in a.m. · Blood cultures pending  · IV antibiotics as noted above    COVID-19  Assessment & Plan  Patient found to be incidentally positive for COVID-19  Currently asymptomatic. Stable on room air. Reports having cold-like symptoms about a month ago  No indication for treatment    Colorectal anastomotic stricture  Assessment & Plan  Patient underwent pouchoscopy with dilatation and stenting on 10/5 at [facility]  Transfer to [facility] once bed available    Thrombocytosis  Assessment & Plan  Platelet count noted to be chronically elevated  Repeat lab work in a.m. VTE Pharmacologic Prophylaxis:   low risk, ambulatory, possible OR post transfer  Code Status: Level 1 - Full Code   Discussion with family: Patient declined call to . Anticipated Length of Stay: Patient will be admitted on an observation basis with an anticipated length of stay of less than 2 midnights secondary to Pneumoperitoneum. Total Time Spent on Date of Encounter in care of patient:  This time was spent on one or more of the following: performing physical exam; counseling and coordination of care; obtaining or reviewing history; documenting in the medical record; reviewing/ordering tests, medications or procedures; communicating with other healthcare professionals and discussing with patient's family/caregivers. Chief Complaint: Abdominal pain, fever    History of Present Illness:  Bria Lucas is a 25 y.o. male who presents with fever, abd pain, vomiting. Patient underwent pouchoscopy with dilatation and stenting for colorectal stricture at [facility]. Patient states he was doing well postprocedure and was discharged home. On his way back home, he started to become diaphoretic and started to vomit. Started to have fevers at home with Tmax 102.7. Spoke with his doctor who recommended that he come to the ER. Patient denies any cough, congestion. Had a cold 1 month ago. States pain is in lower abd, groin area pain. 10/10 without pain meds and with pain meds 0/10. Imaging done in the ER showed findings of pneumoperitoneum. Plan of care was coordinated with his surgical team at [facility] however still waiting on a bed and therefore being admitted till bed is available    Review of Systems:  Review of Systems   Constitutional: Positive for fever. Negative for chills. HENT: Negative for congestion and trouble swallowing. Eyes: Negative for photophobia and visual disturbance. Respiratory: Negative for cough, chest tightness and shortness of breath. Cardiovascular: Negative for chest pain and leg swelling. Gastrointestinal: Positive for abdominal pain, nausea and vomiting. Negative for constipation and diarrhea. Genitourinary: Positive for urgency (since April). Negative for dysuria, frequency and hematuria. Musculoskeletal: Negative for back pain. Neurological: Positive for light-headedness (in ER yest). Hematological: Does not bruise/bleed easily. Psychiatric/Behavioral: Negative for agitation.        Past Medical and Surgical History:   Past Medical History:   Diagnosis Date   • Ankylosing spondylitis ([address])    • Anxiety    • Bowel obstruction ([address])    • Clostridium difficile colitis 9/13/2018   • Colitis    • 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[address]) 9/13/2018   • Pancreatitis    • Ulcerative colitis ([address])        Past Surgical History:   Procedure Laterality Date   • COLECTOMY TOTAL      with ileal pouch and anastemosis   • IR PICC PLACEMENT SINGLE LUMEN  3/1/2022   • TOTAL COLECTOMY         Meds/Allergies:  Prior to Admission medications    Medication Sig Start Date End Date Taking? Authorizing Provider   acetaminophen (TYLENOL) 325 mg tablet Take 2 tablets (650 mg total) by mouth every 6 (six) hours as needed for mild pain, headaches or fever 1/24/23   Darlin Martinez DO   ALPRAZolam (XANAX) 0.5 mg tablet 1-2 po q8hrs prn anxiety, avoid use with opioids 8/10/23   Hilario Hunter DO   diphenoxylate-atropine (LOMOTIL) 2.5-0.025 mg per tablet Take 2 tablets by mouth  Patient not taking: Reported on 5/18/2023 2/20/23   Historical Provider, MD   HYDROmorphone (DILAUDID) 2 mg tablet  5/1/23   Historical Provider, MD   loperamide (IMODIUM) 2 mg capsule Take 1 capsule (2 mg total) by mouth 3 (three) times a day before meals Do not start before March 16, 2023. 3/16/23   Kofi Haile MD   temazepam (RESTORIL) 7.5 mg capsule Take 1 capsule (7.5 mg total) by mouth daily at bedtime as needed for sleep Avoid use with xanax and opioids 5/18/23   Hilario Hunter DO     I have reviewed home medications with patient personally. Allergies:    Allergies   Allergen Reactions   • Cefazolin Hives     Other reaction(s): Arrythmia (Abnormal Heartbeat), Rash Maculopapular   • Mesalamine GI Intolerance     Other reaction(s): Pancreatitis  Annotation - [id number]: pancreatitis   • Silver Rash     [employer] ostomy Products   • Wound Dressings Rash     [employer] ostomy Products        Social History:  Marital Status: Single   Occupation:   Patient Pre-hospital Living Situation: family  Patient Pre-hospital Level of Mobility: walks  Patient Pre-hospital Diet Restrictions: none  Substance Use History:   Social History     Substance and Sexual Activity   Alcohol Use Not Currently    Comment: pt states 5 a month/socially     Social History     Tobacco Use   Smoking Status Never   Smokeless Tobacco Never     Social History     Substance and Sexual Activity   Drug Use No       Family History:  Family History   Problem Relation Age of Onset   • Lung disease Neg Hx        Physical Exam:     Vitals:   Blood Pressure: 133/73 (10/06/23 1145)  Pulse: 68 (10/06/23 1145)  Temperature: 98.4 °F (36.9 °C) (10/06/23 1145)  Temp Source: Oral (10/06/23 1145)  Respirations: 18 (10/06/23 1145)  Weight - Scale: 77.1 kg (170 lb) (10/05/23 2155)  SpO2: 96 % (10/06/23 1145)    Physical Exam  Vitals reviewed. Constitutional:       General: He is not in acute distress. Appearance: He is not ill-appearing or toxic-appearing. HENT:      Head: Normocephalic and atraumatic. Eyes:      General:         Right eye: No discharge. Left eye: No discharge. Cardiovascular:      Rate and Rhythm: Normal rate and regular rhythm. Pulmonary:      Effort: Pulmonary effort is normal. No respiratory distress. Breath sounds: Normal breath sounds. No wheezing or rales. Abdominal:      General: Bowel sounds are normal. There is no distension. Palpations: Abdomen is soft. Tenderness: There is abdominal tenderness (mild lower abdomen). There is no guarding or rebound. Comments: Ostomy in place   Musculoskeletal:      Right lower leg: No edema. Left lower leg: No edema. Neurological:      Mental Status: He is alert and oriented to person, place, and time.           Additional Data:     Lab Results:  Results from last 7 days   Lab Units 10/05/23  2213   WBC Thousand/uL 25.04*   HEMOGLOBIN g/dL 12.3   HEMATOCRIT % 39.4   PLATELETS Thousands/uL 435*   BANDS PCT % 1 LYMPHO PCT % 6*   MONO PCT % 3*   EOS PCT % 0     Results from last 7 days   Lab Units 10/05/23  2213   SODIUM mmol/L 134*   POTASSIUM mmol/L 3.7   CHLORIDE mmol/L 100   CO2 mmol/L 23   BUN mg/dL 15   CREATININE mg/dL 1.10   ANION GAP mmol/L 11   CALCIUM mg/dL 9.9   ALBUMIN g/dL 4.7   TOTAL BILIRUBIN mg/dL 1.17*   ALK PHOS U/L 108*   ALT U/L 34   AST U/L 23   GLUCOSE RANDOM mg/dL 113                 Results from last 7 days   Lab Units 10/06/23  0027 10/05/23  2213   LACTIC ACID mmol/L 1.1 2.2*       Lines/Drains:  Invasive Devices     Peripheral Intravenous Line  Duration           Peripheral IV 10/05/23 Right Antecubital <1 day          Drain  Duration           Ileostomy Continent (Abdominal pouch)  days                    Imaging: Reviewed radiology reports from this admission including: abdominal/pelvic CT  CT abdomen pelvis with contrast   Final Result by Stuart Reynoso MD (10/06 8403)      Pneumoperitoneum which may be related to recent pouchoscopy and stent placement. Pneumoperitoneum tracks superiorly within the mesenteric leaflets to the mid abdominal level. No evidence of fluid collection. .      Findings discussed with Dr. Emily Weems at 12:50 a.m., 10/6/2023. Workstation performed: [id number]             EKG and Other Studies Reviewed on Admission:   · EKG: no ekg noted    ** Please Note: This note has been constructed using a voice recognition system.  **

## 2023-10-06 NOTE — ED NOTES
Patient requesting something else for nausea. Also told this RN that IV Reglan makes him feel "very bad". Dr. Bains Setters aware.      [place], [place]  10/06/23 6943

## 2023-10-06 NOTE — NURSING NOTE
Transport at bedside to take patient to transfer facility. Patient [id number] complaints. Belongings sent with Patient.

## 2023-10-06 NOTE — PLAN OF CARE
Problem: PAIN - ADULT  Goal: Verbalizes/displays adequate comfort level or baseline comfort level  Description: Interventions:  - Encourage patient to monitor pain and request assistance  - Assess pain using appropriate pain scale  - Administer analgesics based on type and severity of pain and evaluate response  - Implement non-pharmacological measures as appropriate and evaluate response  - Consider cultural and social influences on pain and pain management  - Notify physician/advanced practitioner if interventions unsuccessful or patient reports new pain  Outcome: Adequate for Discharge     Problem: INFECTION - ADULT  Goal: Absence or prevention of progression during hospitalization  Description: INTERVENTIONS:  - Assess and monitor for signs and symptoms of infection  - Monitor lab/diagnostic results  - Monitor all insertion sites, i.e. indwelling lines, tubes, and drains  - Monitor endotracheal if appropriate and nasal secretions for changes in amount and color  - [place] appropriate cooling/warming therapies per order  - Administer medications as ordered  - Instruct and encourage patient and family to use good hand hygiene technique  - Identify and instruct in appropriate isolation precautions for identified infection/condition  Outcome: Adequate for Discharge  Goal: Absence of fever/infection during neutropenic period  Description: INTERVENTIONS:  - Monitor WBC    Outcome: Adequate for Discharge     Problem: SAFETY ADULT  Goal: Patient will remain free of falls  Description: INTERVENTIONS:  - Educate patient/family on patient safety including physical limitations  - Instruct patient to call for assistance with activity   - Consult OT/PT to assist with strengthening/mobility   - Keep Call bell within reach  - Keep bed low and locked with side rails adjusted as appropriate  - Keep care items and personal belongings within reach  - Initiate and maintain comfort rounds  - Make Fall Risk Sign visible to staff  - Offer Toileting every 2 Hours, in advance of need  - Initiate/Maintain bed alarm  - Obtain necessary fall risk management equipment: slipper socks  - Apply yellow socks and bracelet for high fall risk patients  - Consider moving patient to room near nurses station  Outcome: Adequate for Discharge  Goal: Maintain or return to baseline ADL function  Description: INTERVENTIONS:  -  Assess patient's ability to carry out ADLs; assess patient's baseline for ADL function and identify physical deficits which impact ability to perform ADLs (bathing, care of mouth/teeth, toileting, grooming, dressing, etc.)  - Assess/evaluate cause of self-care deficits   - Assess range of motion  - Assess patient's mobility; develop plan if impaired  - Assess patient's need for assistive devices and provide as appropriate  - Encourage maximum independence but intervene and supervise when necessary  - Involve family in performance of ADLs  - Assess for home care needs following discharge   - Consider OT consult to assist with ADL evaluation and planning for discharge  - Provide patient education as appropriate  Outcome: Adequate for Discharge  Goal: Maintains/Returns to pre admission functional level  Description: INTERVENTIONS:  - Perform BMAT or MOVE assessment daily.   - Set and communicate daily mobility goal to care team and patient/family/caregiver. - Collaborate with rehabilitation services on mobility goals if consulted  - Perform Range of Motion 3 times a day. - Reposition patient every 2 hours.   - Dangle patient 3 times a day  - Stand patient 3 times a day  - Ambulate patient 3 times a day  - Out of bed to chair 3 times a day   - Out of bed for meals 3 times a day  - Out of bed for toileting  - Record patient progress and toleration of activity level   Outcome: Adequate for Discharge     Problem: DISCHARGE PLANNING  Goal: Discharge to home or other facility with appropriate resources  Description: INTERVENTIONS:  - Identify barriers to discharge w/patient and caregiver  - Arrange for needed discharge resources and transportation as appropriate  - Identify discharge learning needs (meds, wound care, etc.)  - Arrange for interpretive services to assist at discharge as needed  - Refer to Case Management Department for coordinating discharge planning if the patient needs post-hospital services based on physician/advanced practitioner order or complex needs related to functional status, cognitive ability, or social support system  Outcome: Adequate for Discharge     Problem: Knowledge Deficit  Goal: Patient/family/caregiver demonstrates understanding of disease process, treatment plan, medications, and discharge instructions  Description: Complete learning assessment and assess knowledge base.   Interventions:  - Provide teaching at level of understanding  - Provide teaching via preferred learning methods  Outcome: Adequate for Discharge     Problem: GASTROINTESTINAL - ADULT  Goal: Minimal or absence of nausea and/or vomiting  Description: INTERVENTIONS:  - Administer IV fluids if ordered to ensure adequate hydration  - Maintain NPO status until nausea and vomiting are resolved  - Nasogastric tube if ordered  - Administer ordered antiemetic medications as needed  - Provide nonpharmacologic comfort measures as appropriate  - Advance diet as tolerated, if ordered  - Consider nutrition services referral to assist patient with adequate nutrition and appropriate food choices  Outcome: Adequate for Discharge  Goal: Maintains or returns to baseline bowel function  Description: INTERVENTIONS:  - Assess bowel function  - Encourage oral fluids to ensure adequate hydration  - Administer IV fluids if ordered to ensure adequate hydration  - Administer ordered medications as needed  - Encourage mobilization and activity  - Consider nutritional services referral to assist patient with adequate nutrition and appropriate food choices  Outcome: Adequate for Discharge  Goal: Maintains adequate nutritional intake  Description: INTERVENTIONS:  - Monitor percentage of each meal consumed  - Identify factors contributing to decreased intake, treat as appropriate  - Assist with meals as needed  - Monitor I&O, weight, and lab values if indicated  - Obtain nutrition services referral as needed  Outcome: Adequate for Discharge  Goal: Establish and maintain optimal ostomy function  Description: INTERVENTIONS:  - Assess bowel function  - Encourage oral fluids to ensure adequate hydration  - Administer IV fluids if ordered to ensure adequate hydration   - Administer ordered medications as needed  - Encourage mobilization and activity  - Nutrition services referral to assist patient with appropriate food choices  - Assess stoma site  - Consider wound care consult   Outcome: Adequate for Discharge  Goal: Oral mucous membranes remain intact  Description: INTERVENTIONS  - Assess oral mucosa and hygiene practices  - Implement preventative oral hygiene regimen  - Implement oral medicated treatments as ordered  - Initiate Nutrition services referral as needed  Outcome: Adequate for Discharge

## 2023-10-06 NOTE — ED NOTES
Dr. Haynes Friday at [address] [place] [address], [address] [place] [address]  10/05/23 3889

## 2023-10-06 NOTE — ASSESSMENT & PLAN NOTE
As noted by tachycardia, leukocytosis  · Lactic acid mildly elevated and normalized  · Repeat lab work in a.m.   · Blood cultures pending  · IV antibiotics as noted above

## 2023-10-06 NOTE — ASSESSMENT & PLAN NOTE
As noted by tachycardia, leukocytosis  · Lactic acid mildly elevated and normalized  · Repeat lab work in a.m.   · Blood cultures pending at time of discharge  · IV antibiotics as noted above

## 2023-10-06 NOTE — ASSESSMENT & PLAN NOTE
Patient presented to the ER on 10/5 due to 10/10 abdominal pain, fever  · On imaging noted to have findings of pneumoperitoneum tracking within the mesenteric leaflets to the mid abdomen  · Plan of care was coordinated with the surgical team at [facility] and plan to transfer her there for further management once bed available  · Patient received a dose of IV cefepime and Vanco in the ER  · We will continue with IV cefepime and add Flagyl for anaerobic coverage  · N.p.o., IV fluids, pain medication/nausea medication as needed

## 2023-10-06 NOTE — ED PROVIDER NOTES
Patient's CT shows pneumoperitoneum  Prior provider discusses w/ Dr. Navdeep Weiner of GI phone service  Recommends transfer but can't accept  Transfer center at [facility] Dr. Paulina Hernandez of GI in agreement, can't accept  Dr. Lidia Khalil of gen surg accepts    Patient has received vanc, cefepime, multiple doses of dilaudid and antiemetic including zyprexa    CT abdomen pelvis with contrast   Final Result by Jenna aFtima MD (10/06 7741)      Pneumoperitoneum which may be related to recent pouchoscopy and stent placement. Pneumoperitoneum tracks superiorly within the mesenteric leaflets to the mid abdominal level. No evidence of fluid collection. .      Findings discussed with Dr. Elvira Tierney at 12:50 a.m., 10/6/2023.                   Workstation performed: [id number]               No change in vitals or exam during my shift     Ethel Christensen MD  10/06/23 8341

## 2023-10-06 NOTE — ASSESSMENT & PLAN NOTE
Patient found to be incidentally positive for COVID-19  Currently asymptomatic. Stable on room air.   Reports having cold-like symptoms about a month ago  No indication for treatment

## 2023-10-06 NOTE — EMTALA/ACUTE CARE TRANSFER
[address] [address] [address]  [address] [address]  Dept: [phone]      EMTALA TRANSFER CONSENT    NAME Prince Price                                         1995                              MRN [id number]    I have been informed of my rights regarding examination, treatment, and transfer   by Dr. Steff Santillan MD    Benefits: Continuity of care    Risks: Potential for delay in receiving treatment, Potential deterioration of medical condition, Loss of IV, Increased discomfort during transfer, Possible worsening of condition or death during transfer      Consent for Transfer:  I acknowledge that my medical condition has been evaluated and explained to me by the emergency department physician or other qualified medical person and/or my attending physician, who has recommended that I be transferred to the service of  Accepting Physician: Corinne Millan at [address] Name, [address] : [facility]. The above potential benefits of such transfer, the potential risks associated with such transfer, and the probable risks of not being transferred have been explained to me, and I fully understand them. The doctor has explained that, in my case, the benefits of transfer outweigh the risks. I agree to be transferred. I authorize the performance of emergency medical procedures and treatments upon me in both transit and upon arrival at the receiving facility. Additionally, I authorize the release of any and all medical records to the receiving facility and request they be transported with me, if possible. I understand that the safest mode of transportation during a medical emergency is an ambulance and that the Hospital advocates the use of this mode of transport.  Risks of traveling to the receiving facility by car, including absence of medical control, life sustaining equipment, such as oxygen, and medical personnel has been explained to me and I fully understand them.    ([address])  [  ]  I consent to the stated transfer and to be transported by ambulance/helicopter. [  ]  I consent to the stated transfer, but refuse transportation by ambulance and accept full responsibility for my transportation by car. I understand the risks of non-ambulance transfers and I exonerate the Hospital and its staff from any deterioration in my condition that results from this refusal.    X___________________________________________    DATE  10/06/23  TIME________  Signature of patient or legally responsible individual signing on patient behalf           RELATIONSHIP TO PATIENT_________________________          Provider Certification    NAME Carlos Alberto Rodríguez                                         1995                              MRN [id number]    A medical screening exam was performed on the above named patient. Based on the examination:    Condition Necessitating Transfer The primary encounter diagnosis was Abdominal pain. A diagnosis of Pneumoperitoneum was also pertinent to this visit.     Patient Condition: The patient has been stabilized such that within reasonable medical probability, no material deterioration of the patient condition or the condition of the unborn child(gonzalez) is likely to result from the transfer    Reason for Transfer: Patient/Family request, Other (Include comment)____________________ (continuity)    Transfer Requirements: [address]   · Space available and qualified personnel available for treatment as acknowledged by [facility]  · Agreed to accept transfer and to provide appropriate medical treatment as acknowledged by       Automatic Data  · Appropriate medical records of the examination and treatment of the patient are provided at the time of transfer   [address] _______  · Transfer will be performed by qualified personnel from    and appropriate transfer equipment as required, including the use of necessary and appropriate life support measures. Provider Certification: I have examined the patient and explained the following risks and benefits of being transferred/refusing transfer to the patient/family:  General risk, such as traffic hazards, adverse weather conditions, rough terrain or turbulence, possible failure of equipment (including vehicle or aircraft), or consequences of actions of persons outside the control of the transport personnel, Risk of worsening condition, The possibility of a transport vehicle being unavailable, Unanticipated needs of medical equipment and personnel during transport      Based on these reasonable risks and benefits to the patient and/or the unborn child(gonzalez), and based upon the information available at the time of the patient’s examination, I certify that the medical benefits reasonably to be expected from the provision of appropriate medical treatments at another medical facility outweigh the increasing risks, if any, to the individual’s medical condition, and in the case of labor to the unborn child, from effecting the transfer.     X____________________________________________ DATE 10/06/23        TIME_______      ORIGINAL - SEND TO MEDICAL RECORDS   COPY - SEND WITH PATIENT DURING TRANSFER

## 2023-10-06 NOTE — ED PROVIDER NOTES
History  Chief Complaint   Patient presents with   • Abdominal Pain     Patient had endoscopic surgery and stents placed, developed a fever of 102 and extreme pain, surgeon told to come to closest ER, patient is producing stool in colostomy     HPI  Patient is 33-year-old male history of ankylosing spondylitis, ileal pouchitis, ulcerative colitis, numerous prior abdominal surgeries now with end ostomy, chronic abdominal pain presenting for evaluation of abdominal pain, fever. Patient had pouchoscopy with dilation and stenting performed for colorectal stricture. Procedure was performed earlier in the day today at [facility] by Dr. Orlando Calles. Patient initially tolerated the procedure well, was pain-free at time of discharge around 1400 today however over the course of the evening had progressive abdominal pain and fever with a Tmax of 102. Patient states some nausea without vomiting. At time of evaluation in the ER patient continues to complain of severe lower abdominal pain however states that fever has resolved. Prior to Admission Medications   Prescriptions Last Dose Informant Patient Reported? Taking? ALPRAZolam (XANAX) 0.5 mg tablet   No No   Si-2 po q8hrs prn anxiety, avoid use with opioids   HYDROmorphone (DILAUDID) 2 mg tablet  Self Yes No   Patient not taking: Reported on 2023   acetaminophen (TYLENOL) 325 mg tablet  Self No No   Sig: Take 2 tablets (650 mg total) by mouth every 6 (six) hours as needed for mild pain, headaches or fever   diphenoxylate-atropine (LOMOTIL) 2.5-0.025 mg per tablet  Self Yes No   Sig: Take 2 tablets by mouth   Patient not taking: Reported on 2023   loperamide (IMODIUM) 2 mg capsule  Self No No   Sig: Take 1 capsule (2 mg total) by mouth 3 (three) times a day before meals Do not start before 2023.    temazepam (RESTORIL) 7.5 mg capsule   No No   Sig: Take 1 capsule (7.5 mg total) by mouth daily at bedtime as needed for sleep Avoid use with xanax and opioids      Facility-Administered Medications: None       Past Medical History:   Diagnosis Date   • Ankylosing spondylitis (HCC)    • Anxiety    • Bowel obstruction (HCC)    • Clostridium difficile colitis 9/13/2018   • Colitis    • 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[address]) 9/13/2018   • Pancreatitis    • Ulcerative colitis ([address])        Past Surgical History:   Procedure Laterality Date   • COLECTOMY TOTAL      with ileal pouch and anastemosis   • IR PICC PLACEMENT SINGLE LUMEN  3/1/2022   • TOTAL COLECTOMY         Family History   Problem Relation Age of Onset   • Lung disease Neg Hx      I have reviewed and agree with the history as documented. E-Cigarette/Vaping   • E-Cigarette Use Never User      E-Cigarette/Vaping Substances   • Nicotine No    • THC No    • CBD No    • Flavoring No    • Other No    • Unknown No      Social History     Tobacco Use   • Smoking status: Never   • Smokeless tobacco: Never   Vaping Use   • Vaping Use: Never used   Substance Use Topics   • Alcohol use: Not Currently     Comment: pt states 5 a month/socially   • Drug use: No       Review of Systems   Constitutional: Negative for chills, fatigue and fever. Respiratory: Negative for cough and shortness of breath. Cardiovascular: Negative for chest pain. Gastrointestinal: Positive for abdominal pain. Negative for abdominal distention, constipation, diarrhea, nausea and vomiting. Genitourinary: Negative for flank pain and frequency. Musculoskeletal: Negative for arthralgias and myalgias. Neurological: Negative for headaches. Psychiatric/Behavioral: Negative for confusion. All other systems reviewed and are negative. Physical Exam  Physical Exam  Vitals and nursing note reviewed. Constitutional:       General: He is not in acute distress. Appearance: Normal appearance. He is not ill-appearing, toxic-appearing or diaphoretic.       Comments: Uncomfortable appearing but nontoxic nondistressed   HENT:      Head: Normocephalic and atraumatic. Comments: Moist mucous membranes     Right Ear: External ear normal.      Left Ear: External ear normal.   Eyes:      General:         Right eye: No discharge. Left eye: No discharge. Cardiovascular:      Comments: Sinus tachycardia rate of 120. No murmurs rubs or gallops. Extremities warm and well-perfused without mottling. Pulmonary:      Effort: No respiratory distress. Abdominal:      General: There is no distension. Tenderness: There is abdominal tenderness. Comments: Generalized lower abdominal tenderness without rigidity, rebound, guarding. Abdomen nondistended. Musculoskeletal:         General: No deformity. Cervical back: Normal range of motion. Skin:     Findings: No lesion or rash. Neurological:      Mental Status: He is alert and oriented to person, place, and time. Mental status is at baseline.       Comments: AAOx4, pleasant, interactive   Psychiatric:         Mood and Affect: Mood and affect normal.         Vital Signs  ED Triage Vitals   Temperature Pulse Respirations Blood Pressure SpO2   10/05/23 2155 10/05/23 2158 10/05/23 2155 10/05/23 2155 10/05/23 2158   98.4 °F (36.9 °C) (!) 145 20 143/62 96 %      Temp Source Heart Rate Source Patient Position - Orthostatic VS BP Location FiO2 (%)   10/05/23 2155 10/05/23 2257 10/05/23 2155 10/05/23 2155 --   Oral Monitor Sitting Right arm       Pain Score       10/05/23 2155       10 - Worst Possible Pain           Vitals:    10/06/23 0244 10/06/23 0830 10/06/23 1000 10/06/23 1145   BP: 116/62 122/67 118/73 133/73   Pulse: (!) 107 104 93 68   Patient Position - Orthostatic VS: Sitting   Lying         Visual Acuity      ED Medications  Medications   cefepime (MAXIPIME) 2 g/50 mL dextrose IVPB (2,000 mg Intravenous New Bag 10/6/23 1131)   metroNIDAZOLE (FLAGYL) IVPB (premix) 500 mg 100 mL (500 mg Intravenous New Bag 10/6/23 1008)   lactated ringers infusion (125 mL/hr Intravenous New Bag 10/6/23 1248)   ondansetron (ZOFRAN) injection 4 mg (4 mg Intravenous Given 10/6/23 1544)   HYDROmorphone (DILAUDID) injection 0.5 mg (has no administration in time range)   HYDROmorphone (DILAUDID) injection 1 mg (1 mg Intravenous Given 10/6/23 1544)   HYDROmorphone HCl (DILAUDID) injection 0.2 mg (has no administration in time range)   promethazine (PHENERGAN) injection 12.5 mg (has no administration in time range)   ondansetron (ZOFRAN) injection 4 mg (4 mg Intravenous Given 10/5/23 2220)   sodium chloride 0.9 % bolus 1,000 mL (0 mL Intravenous Stopped 10/5/23 2347)   HYDROmorphone (DILAUDID) injection 2 mg (2 mg Intravenous Given 10/5/23 2219)   vancomycin (VANCOCIN) 2,000 mg in sodium chloride 0.9 % 500 mL IVPB (0 mg Intravenous Stopped 10/6/23 0240)   cefepime (MAXIPIME) IVPB (premix in dextrose) 2,000 mg 50 mL (0 mg Intravenous Stopped 10/6/23 0017)   iohexol (OMNIPAQUE) 350 MG/ML injection (MULTI-DOSE) 100 mL (100 mL Intravenous Given 10/5/23 2342)   HYDROmorphone (DILAUDID) injection 2 mg (2 mg Intravenous Given 10/6/23 0027)   ondansetron (ZOFRAN) injection 4 mg (4 mg Intravenous Given 10/6/23 0115)   OLANZapine (ZyPREXA ZYDIS) dispersible tablet 10 mg (10 mg Oral Given 10/6/23 0239)   HYDROmorphone (DILAUDID) injection 2 mg (2 mg Intravenous Given 10/6/23 0827)       Diagnostic Studies  Results Reviewed     Procedure Component Value Units Date/Time    Blood culture #1 [[id number]] Collected: 10/05/23 2213    Lab Status: Preliminary result Specimen: Blood from Arm, Right Updated: 10/06/23 1201     Blood Culture Received in Microbiology Lab. Culture in Progress. Blood culture #2 [[id number]] Collected: 10/05/23 2213    Lab Status: Preliminary result Specimen: Blood from Hand, Right Updated: 10/06/23 1201     Blood Culture Received in Microbiology Lab. Culture in Progress.     COVID only [[id number]]  (Abnormal) Collected: 10/06/23 0308    Lab Status: Final result Specimen: Nares from Nose Updated: 10/06/23 2780     SARS-CoV-2 Positive    Narrative:      FOR PEDIATRIC PATIENTS - copy/paste COVID Guidelines URL to browser: [email or web address]    SARS-CoV-2 assay is a Nucleic Acid Amplification assay intended for the  qualitative detection of nucleic acid from SARS-CoV-2 in nasopharyngeal  swabs. Results are for the presumptive identification of SARS-CoV-2 RNA. Positive results are indicative of infection with SARS-CoV-2, the virus  causing COVID-19, but do not rule out bacterial infection or co-infection  with other viruses. Laboratories within the [facility] and its  territories are required to report all positive results to the appropriate  public health authorities. Negative results do not preclude SARS-CoV-2  infection and should not be used as the sole basis for treatment or other  patient management decisions. Negative results must be combined with  clinical observations, patient history, and epidemiological information. This test has not been FDA cleared or approved. This test has been authorized by FDA under an Emergency Use Authorization  (EUA). This test is only authorized for the duration of time the  declaration that circumstances exist justifying the authorization of the  emergency use of an in vitro diagnostic tests for detection of SARS-CoV-2  virus and/or diagnosis of COVID-19 infection under section 564(b)(1) of  the Act, 21 U. S.C. 790EFO-7(I)(6), unless the authorization is terminated  or revoked sooner. The test has been validated but independent review by FDA  and CLIA is pending. Test performed using [employer] GeneXpert: This RT-PCR assay targets N2,  a region unique to SARS-CoV-2. A conserved region in the E-gene was chosen  for pan-Sarbecovirus detection which includes SARS-CoV-2. According to CMS-2020-01-R, this platform meets the definition of high-throughput technology.     Lactic acid 2 Hours [[id number]]  (Normal) Collected: 10/06/23 0027    Lab Status: Final result Specimen: Blood from Arm, Right Updated: 10/06/23 0117     LACTIC ACID 1.1 mmol/L     Narrative:      Result may be elevated if tourniquet was used during collection. Lactic acid, plasma (w/reflex if result > 2.0) [[id number]]  (Abnormal) Collected: 10/05/23 2213    Lab Status: Final result Specimen: Blood from Arm, Right Updated: 10/05/23 2339     LACTIC ACID 2.2 mmol/L     Narrative:      Result may be elevated if tourniquet was used during collection. RBC Morphology Reflex Test [[id number]] Collected: 10/05/23 2213    Lab Status: Final result Specimen: Blood from Arm, Right Updated: 10/05/23 2301    CBC and differential [[id number]]  (Abnormal) Collected: 10/05/23 2213    Lab Status: Final result Specimen: Blood from Arm, Right Updated: 10/05/23 2247     WBC 25.04 Thousand/uL      RBC 6.33 Million/uL      Hemoglobin 12.3 g/dL      Hematocrit 39.4 %      MCV 62 fL      MCH 19.4 pg      MCHC 31.2 g/dL      RDW 18.1 %      MPV 8.4 fL      Platelets 326 Thousands/uL     Narrative: This is an appended report. These results have been appended to a previously verified report.     Manual Differential(PHLEBS Do Not Order) [[id number]]  (Abnormal) Collected: 10/05/23 2213    Lab Status: Final result Specimen: Blood from Arm, Right Updated: 10/05/23 2247     Segmented % 90 %      Bands % 1 %      Lymphocytes % 6 %      Monocytes % 3 %      Eosinophils, % 0 %      Basophils % 0 %      Absolute Neutrophils 22.79 Thousand/uL      Lymphocytes Absolute 1.50 Thousand/uL      Monocytes Absolute 0.75 Thousand/uL      Eosinophils Absolute 0.00 Thousand/uL      Basophils Absolute 0.00 Thousand/uL      Total Counted --     RBC Morphology Present     Platelet Estimate Adequate     Basophilic Stippling Present     Hypochromia Present     Microcytes Present     Ovalocytes Present     Stomatocytes Present     Tear Drop Cells Present    Lipase [[id number]]  (Normal) Collected: 10/05/23 2213 Lab Status: Final result Specimen: Blood from Arm, Right Updated: 10/05/23 2246     Lipase 16 u/L     Comprehensive metabolic panel [[id number]]  (Abnormal) Collected: 10/05/23 2213    Lab Status: Final result Specimen: Blood from Arm, Right Updated: 10/05/23 2246     Sodium 134 mmol/L      Potassium 3.7 mmol/L      Chloride 100 mmol/L      CO2 23 mmol/L      ANION GAP 11 mmol/L      BUN 15 mg/dL      Creatinine 1.10 mg/dL      Glucose 113 mg/dL      Calcium 9.9 mg/dL      AST 23 U/L      ALT 34 U/L      Alkaline Phosphatase 108 U/L      Total Protein 8.1 g/dL      Albumin 4.7 g/dL      Total Bilirubin 1.17 mg/dL      eGFR 89 ml/min/1.73sq m     Narrative:      [place] guidelines for Chronic Kidney Disease (CKD):   •  Stage 1 with normal or high GFR (GFR > 90 mL/min/1.73 square meters)  •  Stage 2 Mild CKD (GFR = 60-89 mL/min/1.73 square meters)  •  Stage 3A Moderate CKD (GFR = 45-59 mL/min/1.73 square meters)  •  Stage 3B Moderate CKD (GFR = 30-44 mL/min/1.73 square meters)  •  Stage 4 Severe CKD (GFR = 15-29 mL/min/1.73 square meters)  •  Stage 5 End Stage CKD (GFR <15 mL/min/1.73 square meters)  Note: GFR calculation is accurate only with a steady state creatinine    UA (URINE) with reflex to Scope [[id number]]     Lab Status: No result Specimen: Urine                  CT abdomen pelvis with contrast   Final Result by Adrian Woodruff MD (10/06 6882)      Pneumoperitoneum which may be related to recent pouchoscopy and stent placement. Pneumoperitoneum tracks superiorly within the mesenteric leaflets to the mid abdominal level. No evidence of fluid collection. .      Findings discussed with Dr. Obdulia Mills at 12:50 a.m., 10/6/2023. Workstation performed: [id number]                    Procedures  Procedures         ED Course                               SBIRT 20yo+    Flowsheet Row Most Recent Value   Initial Alcohol Screen: US AUDIT-C     1.  How often do you have a drink containing alcohol? 2 Filed at: 10/05/2023 2251   2. How many drinks containing alcohol do you have on a typical day you are drinking? 0 Filed at: 10/05/2023 2251   3a. Male UNDER 65: How often do you have five or more drinks on one occasion? 1 Filed at: 10/05/2023 2251   3b. FEMALE Any Age, or MALE 65+: How often do you have 4 or more drinks on one occassion? 0 Filed at: 10/05/2023 2251   Audit-C Score 3 Filed at: 10/05/2023 2251   ROD: How many times in the past year have you. .. Used an illegal drug or used a prescription medication for non-medical reasons? Never Filed at: 10/05/2023 2251                    Medical Decision Making  I obtained history from the patient. I reviewed external medical documentation. Patient with postprocedural severe abdominal pain after endoscopy with stenting. I ordered and reviewed lab work including CBC, CMP, procalcitonin, blood cultures, lactate. Patient with an initial lactate of 2.2 which cleared after IV fluids. Patient with a leukocytosis to 25. Patient treated with broad-spectrum antibiotics Vanco/cefepime, Dilaudid for pain control, IV fluids. I discussed patient with on-call physician for gastroenterology at [facility], Dr. Luciano Toledo who agrees with current plan, recommends CT abdomen pelvis, admission at current campus pending CT findings. CT demonstrating pneumoperitoneum possibly secondary to recent instrumentation. Patient continued to have severe intractable pain. I once again discussed patient with Dr. Luciano Toledo who given the CT findings recommends transfer to [facility] [place] for further evaluation. Patient agreeable with this plan. Patient signed out to Dr. Mayela Spring pending transfer. Amount and/or Complexity of Data Reviewed  Labs: ordered. Radiology: ordered. Risk  Prescription drug management. Decision regarding hospitalization.           Disposition  Final diagnoses:   Abdominal pain   Pneumoperitoneum     Time reflects when diagnosis was documented in both MDM as applicable and the Disposition within this note     Time User Action Codes Description Comment    10/6/2023  3:13 AM Donnella Copas Add [R10.9] Abdominal pain     10/6/2023  3:13 AM Donnella Copas Add [K66.8] Pneumoperitoneum       ED Disposition     ED Disposition   Admit    Condition   Stable    Date/Time   Fri Oct 6, 2023  8:27 AM    Comment   Aleksandr Sullivan should be transferred out to [facility].            MD Documentation    Abron Dominique Most Recent Value   Patient Condition The patient has been stabilized such that within reasonable medical probability, no material deterioration of the patient condition or the condition of the unborn child(gonzalez) is likely to result from the transfer   Reason for Transfer Other (Include comment)____________________   Benefits of Transfer Continuity of care   Risks of Transfer Potential for delay in receiving treatment, Potential deterioration of medical condition, Loss of IV, Increased discomfort during transfer, Possible worsening of condition or death during transfer   Accepting Physician Dr. Cheri Samuel Name, [address] [place]    (Name & Tel number) [facility]   Sending MD Dr. Daniel Power   Provider Certification General risk, such as traffic hazards, adverse weather conditions, rough terrain or turbulence, possible failure of equipment (including vehicle or aircraft), or consequences of actions of persons outside the control of the transport personnel, Unanticipated needs of medical equipment and personnel during transport, Risk of worsening condition      RN Documentation    [address] Name, [address] [place]    (Name & Tel number) [facility]T      Follow-up Information    None         Current Discharge Medication List      CONTINUE these medications which have NOT CHANGED    Details   acetaminophen (TYLENOL) 325 mg tablet Take 2 tablets (650 mg total) by mouth every 6 (six) hours as needed for mild pain, headaches or fever  Refills: 0    Associated Diagnoses: Epigastric abdominal pain      ALPRAZolam (XANAX) 0.5 mg tablet 1-2 po q8hrs prn anxiety, avoid use with opioids  Qty: 90 tablet, Refills: 0    Associated Diagnoses: Situational anxiety      diphenoxylate-atropine (LOMOTIL) 2.5-0.025 mg per tablet Take 2 tablets by mouth      HYDROmorphone (DILAUDID) 2 mg tablet       loperamide (IMODIUM) 2 mg capsule Take 1 capsule (2 mg total) by mouth 3 (three) times a day before meals Do not start before March 16, 2023. Qty: 30 capsule, Refills: 0    Associated Diagnoses: 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HCC)      temazepam (RESTORIL) 7.5 mg capsule Take 1 capsule (7.5 mg total) by mouth daily at bedtime as needed for sleep Avoid use with xanax and opioids  Qty: 30 capsule, Refills: 0    Comments: Call pt when ready  Associated Diagnoses: Other insomnia             No discharge procedures on file.     PDMP Review       Value Time User    PDMP Reviewed  Yes 8/10/2023 11:56 PM Lakhwinder Martin DO          ED Provider  Electronically Signed by           Hugo Brambila MD  10/06/23 5658

## 2023-10-06 NOTE — DISCHARGE SUMMARY
[address] [facility]  [facility]charge- [facility] 1995, 25 y.o. male MRN: [id number]  Unit/Bed#: [address] [place] [address] Encounter: [id number]  Primary Care Provider: Latisha Coffman DO   Date and time admitted to hospital: 10/5/2023  9:53 PM    * Pneumoperitoneum  Assessment & Plan  Patient presented to the ER on 10/5 due to 10/10 abdominal pain, fever  · On imaging noted to have findings of pneumoperitoneum tracking within the mesenteric leaflets to the mid abdomen. · Plan of care was coordinated with the surgical team at [facility] and patient being transferred as bed is now available  · Patient received a dose of IV cefepime and Vanco in the ER  · Will continue with IV cefepime and add Flagyl for anaerobic coverage  · N.p.o., IV fluids, pain medication/nausea medication as needed  · Risks/Benefits of transfer discussed with patient and he is in agreement    [facility])  Assessment & Plan  As noted by tachycardia, leukocytosis  · Lactic acid mildly elevated and normalized  · Repeat lab work in a.m. · Blood cultures pending at time of discharge  · IV antibiotics as noted above    COVID-19  Assessment & Plan  Patient found to be incidentally positive for COVID-19  · Currently asymptomatic. Stable on room air. Reports having cold-like symptoms about a month ago  · No indication for treatment    Colorectal anastomotic stricture  Assessment & Plan  Patient underwent pouchoscopy with dilatation and stenting on 10/5 at [facility]  Being transferred to [facility]    Thrombocytosis  Assessment & Plan  Platelet count noted to be chronically elevated. Repeat lab work in a.m.       Medical Problems     Resolved Problems  Date Reviewed: 10/6/2023   None       Discharging Physician / Practitioner: Braden Reveles DO  PCP: Latisha Coffman DO  Admission Date:   Admission Orders (From admission, onward)     Ordered        10/06/23 0828  Place in Observation  Once                      Discharge Date: 10/06/23    Consultations During Hospital Stay:  · none    Procedures Performed:   · none    Significant Findings / Test Results:   · See above    Incidental Findings:   · none    Test Results Pending at Discharge (will require follow up): · Blood cultures     Outpatient Tests Requested:  · N/A    Complications:  none    Reason for Admission: Pneumoperitoneum    Hospital Course:   Johnna Phelan is a 25 y.o. male patient who originally presented to the hospital on 10/5/2023 due to abdominal pain, fever, vomiting. He underwent pouchoscopy with dilatation and stenting for colorectal stricture at [facility]. On his way back home from the procedure, he started to become diaphoretic and started to vomit. Started to have fevers at home with Tmax 102.7. Spoke with his doctor who recommended that he come to the ER. Pain is in lower abd, groin area pain. 10/10 without pain meds and with pain meds 0/10. Imaging done in the ER showed findings of pneumoperitoneum. Plan of care was coordinated with his surgical team at [facility] and now being transferred for evaluation and further management as bed is available    Please see above list of diagnoses and related plan for additional information. Condition at Discharge: stable    Discharge Day Visit / Exam:   Subjective: Abdominal pain which does improve with pain medication    Vitals: Blood Pressure: 133/73 (10/06/23 1145)  Pulse: 68 (10/06/23 1145)  Temperature: 98.4 °F (36.9 °C) (10/06/23 1145)  Temp Source: Oral (10/06/23 1145)  Respirations: 18 (10/06/23 1145)  Weight - Scale: 77.1 kg (170 lb) (10/05/23 2155)  SpO2: 96 % (10/06/23 1145)  Exam:   Physical Exam  Vitals reviewed. Constitutional:       General: He is not in acute distress. Appearance: He is not toxic-appearing. HENT:      Head: Normocephalic and atraumatic. Eyes:      General:         Right eye: No discharge. Left eye: No discharge. Cardiovascular:      Rate and Rhythm: Normal rate and regular rhythm.    Pulmonary:      Effort: Pulmonary effort is normal. No respiratory distress. Breath sounds: Normal breath sounds. No wheezing or rales. Abdominal:      General: Bowel sounds are normal. There is no distension. Palpations: Abdomen is soft. Tenderness: There is abdominal tenderness (mild in lower abdomen). There is no guarding or rebound. Comments: Ostomy in place   Musculoskeletal:      Right lower leg: No edema. Left lower leg: No edema. Neurological:      Mental Status: He is alert and oriented to person, place, and time. Discharge instructions/Information to patient and family:   See after visit summary for information provided to patient and family. Provisions for Follow-Up Care:  See after visit summary for information related to follow-up care and any pertinent home health orders. Disposition:   Transfer to [facility]    Planned Readmission: no     Discharge Statement:  I spent > 30 minutes discharging the patient. This time was spent on the day of discharge. I had direct contact with the patient on the day of discharge. Greater than 50% of the total time was spent examining patient, answering all patient questions, arranging and discussing plan of care with patient as well as directly providing post-discharge instructions. Additional time then spent on discharge activities. Discharge Medications:  See after visit summary for reconciled discharge medications provided to patient and/or family.       **Please Note: This note may have been constructed using a voice recognition system**

## 2023-10-06 NOTE — ASSESSMENT & PLAN NOTE
Patient underwent pouchoscopy with dilatation and stenting on 10/5 at [facility]  Being transferred to [facility]

## 2023-10-06 NOTE — EMTALA/ACUTE CARE TRANSFER
[address] [place] [address] Road [address]  Dept: [phone]      ACUTE CARE TRANSFER CONSENT    NAME Miranda Smith                                         1995                              MRN [id number]    I have been informed of my rights regarding examination, treatment, and transfer   by Dr. Javier Hay DO    Benefits: Continuity of care    Risks: Potential for delay in receiving treatment, Potential deterioration of medical condition, Loss of IV, Increased discomfort during transfer, Possible worsening of condition or death during transfer      Consent for Transfer:  I acknowledge that my medical condition has been evaluated and explained to me by the treating physician or other qualified medical person and/or my attending physician, who has recommended that I be transferred to the service of  Accepting Physician: Dr. Vera Penn at [address] [place] [address] [address] Name, [address] : [facility]. The above potential benefits of such transfer, the potential risks associated with such transfer, and the probable risks of not being transferred have been explained to me, and I fully understand them. The doctor has explained that, in my case, the benefits of transfer outweigh the risks. I agree to be transferred. I authorize the performance of emergency medical procedures and treatments upon me in both transit and upon arrival at the receiving facility. Additionally, I authorize the release of any and all medical records to the receiving facility and request they be transported with me, if possible. I understand that the safest mode of transportation during a medical emergency is an ambulance and that the Hospital advocates the use of this mode of transport.  Risks of traveling to the receiving facility by car, including absence of medical control, life sustaining equipment, such as oxygen, and medical personnel has been explained to me and I fully understand them.    ([address])  [  ]  I consent to the stated transfer and to be transported by ambulance/helicopter. [  ]  I consent to the stated transfer, but refuse transportation by ambulance and accept full responsibility for my transportation by car. I understand the risks of non-ambulance transfers and I exonerate the Hospital and its staff from any deterioration in my condition that results from this refusal.    X___________________________________________    DATE  10/06/23  TIME________  Signature of patient or legally responsible individual signing on patient behalf           RELATIONSHIP TO PATIENT_________________________          Provider Certification    NAME Aleksandr Sullivan                                         1995                              MRN [id number]    A medical screening exam was performed on the above named patient.   Based on the examination:    Condition Necessitating Transfer abdominal pain, pneumoperitoneum noted on imaging requiring further evaluation and management by surgical team    Patient Condition: The patient has been stabilized such that within reasonable medical probability, no material deterioration of the patient condition or the condition of the unborn child(gonzalez) is likely to result from the transfer    Reason for Transfer: Other (Include comment)____________________    Transfer Requirements: [address]   · Space available and qualified personnel available for treatment as acknowledged by EDMUNDO  · Agreed to accept transfer and to provide appropriate medical treatment as acknowledged by       Dr. Daniel Fenton  · Appropriate medical records of the examination and treatment of the patient are provided at the time of transfer   [address] _______  · Transfer will be performed by qualified personnel from    and appropriate transfer equipment as required, including the use of necessary and appropriate life support measures. Provider Certification: I have examined the patient and explained the following risks and benefits of being transferred/refusing transfer to the patient/family:  General risk, such as traffic hazards, adverse weather conditions, rough terrain or turbulence, possible failure of equipment (including vehicle or aircraft), or consequences of actions of persons outside the control of the transport personnel, Unanticipated needs of medical equipment and personnel during transport, Risk of worsening condition      Based on these reasonable risks and benefits to the patient and/or the unborn child(gonzalez), and based upon the information available at the time of the patient’s examination, I certify that the medical benefits reasonably to be expected from the provision of appropriate medical treatments at another medical facility outweigh the increasing risks, if any, to the individual’s medical condition, and in the case of labor to the unborn child, from effecting the transfer.     X____________________________________________ DATE 10/06/23        TIME_______      ORIGINAL - SEND TO MEDICAL RECORDS   COPY - SEND WITH PATIENT DURING TRANSFER

## 2023-10-07 ENCOUNTER — TELEPHONE (OUTPATIENT)
Dept: FAMILY MEDICINE CLINIC | Facility: CLINIC | Age: 28
End: 2023-10-07

## 2023-10-07 NOTE — TELEPHONE ENCOUNTER
----- Message from [facility], DO sent at 10/6/2023  6:31 PM EDT -----  Regarding: RE: Discharge  Thank you for allowing us to participate in the care of your patient, Angel Roach, who was hospitalized from 10/5/2023 through 10/6/2023 with the admitting diagnosis of pneumoperitoneum. Being transferred to [facility] under surgical service for further management      If you have any additional questions or would like to discuss further, please feel free to contact me.     [facility], [address] [facility], Hospitalist  [phone]

## 2023-10-08 LAB
ATRIAL RATE: 104 BPM
BACTERIA BLD CULT: NORMAL
BACTERIA BLD CULT: NORMAL
P AXIS: 64 DEGREES
PR INTERVAL: 176 MS
QRS AXIS: -13 DEGREES
QRSD INTERVAL: 88 MS
QT INTERVAL: 330 MS
QTC INTERVAL: 433 MS
T WAVE AXIS: 24 DEGREES
VENTRICULAR RATE: 104 BPM

## 2023-10-08 PROCEDURE — 93010 ELECTROCARDIOGRAM REPORT: CPT | Performed by: INTERNAL MEDICINE

## 2023-10-10 ENCOUNTER — TELEPHONE (OUTPATIENT)
Age: 28
End: 2023-10-10

## 2023-10-10 DIAGNOSIS — R11.0 NAUSEA: Primary | ICD-10-CM

## 2023-10-10 RX ORDER — PROCHLORPERAZINE MALEATE 10 MG
10 TABLET ORAL EVERY 6 HOURS PRN
Qty: 30 TABLET | Refills: 0 | Status: SHIPPED | OUTPATIENT
Start: 2023-10-10

## 2023-10-10 NOTE — TELEPHONE ENCOUNTER
Patient called in post hospital discharge 10/6 for abdominal infection. Patient is requesting order for nausea; prefers it is not Zofran (less effective for patient). Patient requesting order as soon as possible. Please follow up with patient for provider's advice and orders.

## 2023-10-11 LAB
BACTERIA BLD CULT: NORMAL
BACTERIA BLD CULT: NORMAL

## 2023-12-05 PROBLEM — A41.9 SEPSIS (HCC): Status: RESOLVED | Noted: 2023-01-26 | Resolved: 2023-12-05

## 2023-12-15 ENCOUNTER — APPOINTMENT (EMERGENCY)
Dept: RADIOLOGY | Facility: HOSPITAL | Age: 28
End: 2023-12-15
Payer: COMMERCIAL

## 2023-12-15 ENCOUNTER — HOSPITAL ENCOUNTER (INPATIENT)
Facility: HOSPITAL | Age: 28
LOS: 1 days | Discharge: HOME | End: 2023-12-17
Attending: EMERGENCY MEDICINE | Admitting: SURGERY
Payer: COMMERCIAL

## 2023-12-15 DIAGNOSIS — R10.9 ABDOMINAL PAIN: Primary | ICD-10-CM

## 2023-12-15 DIAGNOSIS — K91.858: ICD-10-CM

## 2023-12-15 DIAGNOSIS — Z98.890 H/O MAJOR ABDOMINAL SURGERY: ICD-10-CM

## 2023-12-15 DIAGNOSIS — R93.89 ABNORMAL CT SCAN: ICD-10-CM

## 2023-12-15 LAB
ALBUMIN SERPL BCP-MCNC: 4.9 G/DL (ref 3.5–5)
ALP SERPL-CCNC: 79 U/L (ref 34–104)
ALT SERPL W P-5'-P-CCNC: 15 U/L (ref 7–52)
ANION GAP SERPL CALCULATED.3IONS-SCNC: 9 MMOL/L
AST SERPL W P-5'-P-CCNC: 10 U/L (ref 13–39)
BASOPHILS # BLD AUTO: 0.12 THOUSANDS/ÂΜL (ref 0–0.1)
BASOPHILS NFR BLD AUTO: 1 % (ref 0–1)
BILIRUB SERPL-MCNC: 0.62 MG/DL (ref 0.2–1)
BUN SERPL-MCNC: 13 MG/DL (ref 5–25)
CALCIUM SERPL-MCNC: 9.7 MG/DL (ref 8.4–10.2)
CHLORIDE SERPL-SCNC: 102 MMOL/L (ref 96–108)
CO2 SERPL-SCNC: 25 MMOL/L (ref 21–32)
CREAT SERPL-MCNC: 0.97 MG/DL (ref 0.6–1.3)
EOSINOPHIL # BLD AUTO: 0.2 THOUSAND/ÂΜL (ref 0–0.61)
EOSINOPHIL NFR BLD AUTO: 1 % (ref 0–6)
ERYTHROCYTE [DISTWIDTH] IN BLOOD BY AUTOMATED COUNT: 19.9 % (ref 11.6–15.1)
GFR SERPL CREATININE-BSD FRML MDRD: 104 ML/MIN/1.73SQ M
GLUCOSE SERPL-MCNC: 86 MG/DL (ref 65–140)
HCT VFR BLD AUTO: 43 % (ref 36.5–49.3)
HGB BLD-MCNC: 12.7 G/DL (ref 12–17)
IMM GRANULOCYTES # BLD AUTO: 0.09 THOUSAND/UL (ref 0–0.2)
IMM GRANULOCYTES NFR BLD AUTO: 1 % (ref 0–2)
LIPASE SERPL-CCNC: 41 U/L (ref 11–82)
LYMPHOCYTES # BLD AUTO: 2.45 THOUSANDS/ÂΜL (ref 0.6–4.47)
LYMPHOCYTES NFR BLD AUTO: 16 % (ref 14–44)
MCH RBC QN AUTO: 19.3 PG (ref 26.8–34.3)
MCHC RBC AUTO-ENTMCNC: 29.5 G/DL (ref 31.4–37.4)
MCV RBC AUTO: 65 FL (ref 82–98)
MONOCYTES # BLD AUTO: 1.53 THOUSAND/ÂΜL (ref 0.17–1.22)
MONOCYTES NFR BLD AUTO: 10 % (ref 4–12)
NEUTROPHILS # BLD AUTO: 10.92 THOUSANDS/ÂΜL (ref 1.85–7.62)
NEUTS SEG NFR BLD AUTO: 71 % (ref 43–75)
NRBC BLD AUTO-RTO: 0 /100 WBCS
PLATELET # BLD AUTO: 514 THOUSANDS/UL (ref 149–390)
PMV BLD AUTO: 7.9 FL (ref 8.9–12.7)
POTASSIUM SERPL-SCNC: 3.9 MMOL/L (ref 3.5–5.3)
PROT SERPL-MCNC: 8.4 G/DL (ref 6.4–8.4)
RBC # BLD AUTO: 6.59 MILLION/UL (ref 3.88–5.62)
SODIUM SERPL-SCNC: 136 MMOL/L (ref 135–147)
WBC # BLD AUTO: 15.31 THOUSAND/UL (ref 4.31–10.16)

## 2023-12-15 PROCEDURE — 99285 EMERGENCY DEPT VISIT HI MDM: CPT | Performed by: EMERGENCY MEDICINE

## 2023-12-15 PROCEDURE — 83690 ASSAY OF LIPASE: CPT | Performed by: EMERGENCY MEDICINE

## 2023-12-15 PROCEDURE — 85025 COMPLETE CBC W/AUTO DIFF WBC: CPT | Performed by: EMERGENCY MEDICINE

## 2023-12-15 PROCEDURE — 96361 HYDRATE IV INFUSION ADD-ON: CPT

## 2023-12-15 PROCEDURE — 96375 TX/PRO/DX INJ NEW DRUG ADDON: CPT

## 2023-12-15 PROCEDURE — 36415 COLL VENOUS BLD VENIPUNCTURE: CPT

## 2023-12-15 PROCEDURE — 74177 CT ABD & PELVIS W/CONTRAST: CPT

## 2023-12-15 PROCEDURE — 80053 COMPREHEN METABOLIC PANEL: CPT | Performed by: EMERGENCY MEDICINE

## 2023-12-15 PROCEDURE — 99284 EMERGENCY DEPT VISIT MOD MDM: CPT

## 2023-12-15 PROCEDURE — 96374 THER/PROPH/DIAG INJ IV PUSH: CPT

## 2023-12-15 RX ORDER — ONDANSETRON 2 MG/ML
4 INJECTION INTRAMUSCULAR; INTRAVENOUS ONCE
Status: COMPLETED | OUTPATIENT
Start: 2023-12-15 | End: 2023-12-15

## 2023-12-15 RX ORDER — HYDROMORPHONE HCL/PF 1 MG/ML
1 SYRINGE (ML) INJECTION ONCE
Status: COMPLETED | OUTPATIENT
Start: 2023-12-15 | End: 2023-12-15

## 2023-12-15 RX ORDER — SODIUM CHLORIDE 9 MG/ML
150 INJECTION, SOLUTION INTRAVENOUS CONTINUOUS
Status: DISCONTINUED | OUTPATIENT
Start: 2023-12-15 | End: 2023-12-16

## 2023-12-15 RX ORDER — HYDROMORPHONE HYDROCHLORIDE 2 MG/ML
2 INJECTION, SOLUTION INTRAMUSCULAR; INTRAVENOUS; SUBCUTANEOUS ONCE
Status: COMPLETED | OUTPATIENT
Start: 2023-12-15 | End: 2023-12-15

## 2023-12-15 RX ADMIN — SODIUM CHLORIDE 150 ML/HR: 0.9 INJECTION, SOLUTION INTRAVENOUS at 19:34

## 2023-12-15 RX ADMIN — IOHEXOL 50 ML: 240 INJECTION, SOLUTION INTRATHECAL; INTRAVASCULAR; INTRAVENOUS; ORAL at 19:44

## 2023-12-15 RX ADMIN — HYDROMORPHONE HYDROCHLORIDE 1 MG: 1 INJECTION, SOLUTION INTRAMUSCULAR; INTRAVENOUS; SUBCUTANEOUS at 21:51

## 2023-12-15 RX ADMIN — IOHEXOL 100 ML: 350 INJECTION, SOLUTION INTRAVENOUS at 22:17

## 2023-12-15 RX ADMIN — HYDROMORPHONE HYDROCHLORIDE 2 MG: 2 INJECTION, SOLUTION INTRAMUSCULAR; INTRAVENOUS; SUBCUTANEOUS at 19:35

## 2023-12-15 RX ADMIN — ONDANSETRON 4 MG: 2 INJECTION INTRAMUSCULAR; INTRAVENOUS at 19:35

## 2023-12-16 ENCOUNTER — APPOINTMENT (INPATIENT)
Dept: RADIOLOGY | Facility: HOSPITAL | Age: 28
End: 2023-12-16
Payer: COMMERCIAL

## 2023-12-16 LAB
ABO GROUP BLD: NORMAL
ALBUMIN SERPL BCP-MCNC: 4.4 G/DL (ref 3.5–5)
ALP SERPL-CCNC: 75 U/L (ref 34–104)
ALT SERPL W P-5'-P-CCNC: 13 U/L (ref 7–52)
ANION GAP SERPL CALCULATED.3IONS-SCNC: 8 MMOL/L
APTT PPP: 29 SECONDS (ref 23–37)
AST SERPL W P-5'-P-CCNC: 10 U/L (ref 13–39)
BASOPHILS # BLD AUTO: 0.1 THOUSANDS/ÂΜL (ref 0–0.1)
BASOPHILS NFR BLD AUTO: 1 % (ref 0–1)
BILIRUB SERPL-MCNC: 0.74 MG/DL (ref 0.2–1)
BLD GP AB SCN SERPL QL: NEGATIVE
BUN SERPL-MCNC: 11 MG/DL (ref 5–25)
CALCIUM SERPL-MCNC: 9.4 MG/DL (ref 8.4–10.2)
CHLORIDE SERPL-SCNC: 102 MMOL/L (ref 96–108)
CO2 SERPL-SCNC: 25 MMOL/L (ref 21–32)
CREAT SERPL-MCNC: 0.83 MG/DL (ref 0.6–1.3)
EOSINOPHIL # BLD AUTO: 0.2 THOUSAND/ÂΜL (ref 0–0.61)
EOSINOPHIL NFR BLD AUTO: 1 % (ref 0–6)
ERYTHROCYTE [DISTWIDTH] IN BLOOD BY AUTOMATED COUNT: 18.6 % (ref 11.6–15.1)
GFR SERPL CREATININE-BSD FRML MDRD: 118 ML/MIN/1.73SQ M
GLUCOSE SERPL-MCNC: 112 MG/DL (ref 65–140)
GLUCOSE SERPL-MCNC: 120 MG/DL (ref 65–140)
GLUCOSE SERPL-MCNC: 84 MG/DL (ref 65–140)
GLUCOSE SERPL-MCNC: 93 MG/DL (ref 65–140)
GLUCOSE SERPL-MCNC: 94 MG/DL (ref 65–140)
HCT VFR BLD AUTO: 36.3 % (ref 36.5–49.3)
HGB BLD-MCNC: 10.9 G/DL (ref 12–17)
IMM GRANULOCYTES # BLD AUTO: 0.06 THOUSAND/UL (ref 0–0.2)
IMM GRANULOCYTES NFR BLD AUTO: 0 % (ref 0–2)
INR PPP: 1.02 (ref 0.84–1.19)
LYMPHOCYTES # BLD AUTO: 1.7 THOUSANDS/ÂΜL (ref 0.6–4.47)
LYMPHOCYTES NFR BLD AUTO: 11 % (ref 14–44)
MAGNESIUM SERPL-MCNC: 1.7 MG/DL (ref 1.9–2.7)
MCH RBC QN AUTO: 18.8 PG (ref 26.8–34.3)
MCHC RBC AUTO-ENTMCNC: 30 G/DL (ref 31.4–37.4)
MCV RBC AUTO: 63 FL (ref 82–98)
MONOCYTES # BLD AUTO: 1.4 THOUSAND/ÂΜL (ref 0.17–1.22)
MONOCYTES NFR BLD AUTO: 9 % (ref 4–12)
NEUTROPHILS # BLD AUTO: 12.66 THOUSANDS/ÂΜL (ref 1.85–7.62)
NEUTS SEG NFR BLD AUTO: 78 % (ref 43–75)
NRBC BLD AUTO-RTO: 0 /100 WBCS
PLATELET # BLD AUTO: 503 THOUSANDS/UL (ref 149–390)
PMV BLD AUTO: 8.3 FL (ref 8.9–12.7)
POTASSIUM SERPL-SCNC: 3.8 MMOL/L (ref 3.5–5.3)
PROT SERPL-MCNC: 6.7 G/DL (ref 6.4–8.4)
PROTHROMBIN TIME: 13.6 SECONDS (ref 11.6–14.5)
RBC # BLD AUTO: 5.81 MILLION/UL (ref 3.88–5.62)
RH BLD: POSITIVE
SODIUM SERPL-SCNC: 135 MMOL/L (ref 135–147)
SPECIMEN EXPIRATION DATE: NORMAL
WBC # BLD AUTO: 16.12 THOUSAND/UL (ref 4.31–10.16)

## 2023-12-16 PROCEDURE — 85730 THROMBOPLASTIN TIME PARTIAL: CPT | Performed by: INTERNAL MEDICINE

## 2023-12-16 PROCEDURE — 80053 COMPREHEN METABOLIC PANEL: CPT | Performed by: INTERNAL MEDICINE

## 2023-12-16 PROCEDURE — 99223 1ST HOSP IP/OBS HIGH 75: CPT | Performed by: INTERNAL MEDICINE

## 2023-12-16 PROCEDURE — 82948 REAGENT STRIP/BLOOD GLUCOSE: CPT

## 2023-12-16 PROCEDURE — 85610 PROTHROMBIN TIME: CPT | Performed by: INTERNAL MEDICINE

## 2023-12-16 PROCEDURE — 96361 HYDRATE IV INFUSION ADD-ON: CPT

## 2023-12-16 PROCEDURE — 85025 COMPLETE CBC W/AUTO DIFF WBC: CPT | Performed by: INTERNAL MEDICINE

## 2023-12-16 PROCEDURE — 99232 SBSQ HOSP IP/OBS MODERATE 35: CPT | Performed by: SURGERY

## 2023-12-16 PROCEDURE — 86901 BLOOD TYPING SEROLOGIC RH(D): CPT | Performed by: INTERNAL MEDICINE

## 2023-12-16 PROCEDURE — 83735 ASSAY OF MAGNESIUM: CPT | Performed by: INTERNAL MEDICINE

## 2023-12-16 PROCEDURE — 74022 RADEX COMPL AQT ABD SERIES: CPT

## 2023-12-16 PROCEDURE — 96376 TX/PRO/DX INJ SAME DRUG ADON: CPT

## 2023-12-16 PROCEDURE — 86850 RBC ANTIBODY SCREEN: CPT | Performed by: INTERNAL MEDICINE

## 2023-12-16 PROCEDURE — 86900 BLOOD TYPING SEROLOGIC ABO: CPT | Performed by: INTERNAL MEDICINE

## 2023-12-16 RX ORDER — HYDROMORPHONE HCL IN WATER/PF 6 MG/30 ML
0.2 PATIENT CONTROLLED ANALGESIA SYRINGE INTRAVENOUS EVERY 6 HOURS PRN
Status: DISCONTINUED | OUTPATIENT
Start: 2023-12-16 | End: 2023-12-17

## 2023-12-16 RX ORDER — LORAZEPAM 2 MG/ML
0.25 INJECTION INTRAMUSCULAR ONCE
Status: COMPLETED | OUTPATIENT
Start: 2023-12-16 | End: 2023-12-16

## 2023-12-16 RX ORDER — METRONIDAZOLE 500 MG/100ML
500 INJECTION, SOLUTION INTRAVENOUS ONCE
Status: COMPLETED | OUTPATIENT
Start: 2023-12-16 | End: 2023-12-16

## 2023-12-16 RX ORDER — DEXTROSE AND SODIUM CHLORIDE 5; .9 G/100ML; G/100ML
100 INJECTION, SOLUTION INTRAVENOUS CONTINUOUS
Status: DISCONTINUED | OUTPATIENT
Start: 2023-12-16 | End: 2023-12-17

## 2023-12-16 RX ORDER — MAGNESIUM SULFATE 1 G/100ML
1 INJECTION INTRAVENOUS ONCE
Status: COMPLETED | OUTPATIENT
Start: 2023-12-16 | End: 2023-12-16

## 2023-12-16 RX ORDER — METRONIDAZOLE 500 MG/100ML
500 INJECTION, SOLUTION INTRAVENOUS EVERY 8 HOURS
Status: DISCONTINUED | OUTPATIENT
Start: 2023-12-16 | End: 2023-12-16

## 2023-12-16 RX ORDER — HYDROMORPHONE HCL/PF 1 MG/ML
1 SYRINGE (ML) INJECTION ONCE
Status: COMPLETED | OUTPATIENT
Start: 2023-12-16 | End: 2023-12-16

## 2023-12-16 RX ORDER — ONDANSETRON 2 MG/ML
4 INJECTION INTRAMUSCULAR; INTRAVENOUS EVERY 6 HOURS PRN
Status: DISCONTINUED | OUTPATIENT
Start: 2023-12-16 | End: 2023-12-17 | Stop reason: HOSPADM

## 2023-12-16 RX ORDER — HYDROMORPHONE HCL/PF 1 MG/ML
0.2 SYRINGE (ML) INJECTION EVERY 6 HOURS PRN
Status: DISCONTINUED | OUTPATIENT
Start: 2023-12-16 | End: 2023-12-16

## 2023-12-16 RX ORDER — ENOXAPARIN SODIUM 100 MG/ML
40 INJECTION SUBCUTANEOUS
Status: DISCONTINUED | OUTPATIENT
Start: 2023-12-16 | End: 2023-12-17 | Stop reason: HOSPADM

## 2023-12-16 RX ORDER — HYDROMORPHONE HCL/PF 1 MG/ML
0.5 SYRINGE (ML) INJECTION EVERY 6 HOURS PRN
Status: DISCONTINUED | OUTPATIENT
Start: 2023-12-16 | End: 2023-12-17

## 2023-12-16 RX ORDER — DEXTROSE, SODIUM CHLORIDE, SODIUM LACTATE, POTASSIUM CHLORIDE, AND CALCIUM CHLORIDE 5; .6; .31; .03; .02 G/100ML; G/100ML; G/100ML; G/100ML; G/100ML
125 INJECTION, SOLUTION INTRAVENOUS CONTINUOUS
Status: DISCONTINUED | OUTPATIENT
Start: 2023-12-16 | End: 2023-12-16

## 2023-12-16 RX ORDER — LEVOFLOXACIN 5 MG/ML
750 INJECTION, SOLUTION INTRAVENOUS ONCE
Status: COMPLETED | OUTPATIENT
Start: 2023-12-16 | End: 2023-12-16

## 2023-12-16 RX ORDER — LEVOFLOXACIN 5 MG/ML
750 INJECTION, SOLUTION INTRAVENOUS EVERY 24 HOURS
Status: DISCONTINUED | OUTPATIENT
Start: 2023-12-17 | End: 2023-12-16

## 2023-12-16 RX ADMIN — HYDROMORPHONE HYDROCHLORIDE 1 MG: 1 INJECTION, SOLUTION INTRAMUSCULAR; INTRAVENOUS; SUBCUTANEOUS at 00:53

## 2023-12-16 RX ADMIN — ENOXAPARIN SODIUM 40 MG: 40 INJECTION SUBCUTANEOUS at 10:54

## 2023-12-16 RX ADMIN — DEXTROSE AND SODIUM CHLORIDE 125 ML/HR: 5; .9 INJECTION, SOLUTION INTRAVENOUS at 03:34

## 2023-12-16 RX ADMIN — METRONIDAZOLE 500 MG: 500 INJECTION, SOLUTION INTRAVENOUS at 05:31

## 2023-12-16 RX ADMIN — MAGNESIUM SULFATE HEPTAHYDRATE 1 G: 1 INJECTION, SOLUTION INTRAVENOUS at 10:53

## 2023-12-16 RX ADMIN — LORAZEPAM 0.25 MG: 2 INJECTION INTRAMUSCULAR; INTRAVENOUS at 04:19

## 2023-12-16 RX ADMIN — HYDROMORPHONE HYDROCHLORIDE 0.5 MG: 1 INJECTION, SOLUTION INTRAMUSCULAR; INTRAVENOUS; SUBCUTANEOUS at 08:14

## 2023-12-16 RX ADMIN — ONDANSETRON 4 MG: 2 INJECTION INTRAMUSCULAR; INTRAVENOUS at 09:12

## 2023-12-16 RX ADMIN — HYDROMORPHONE HYDROCHLORIDE 0.5 MG: 1 INJECTION, SOLUTION INTRAMUSCULAR; INTRAVENOUS; SUBCUTANEOUS at 19:49

## 2023-12-16 RX ADMIN — LEVOFLOXACIN 750 MG: 750 INJECTION, SOLUTION INTRAVENOUS at 03:48

## 2023-12-16 RX ADMIN — HYDROMORPHONE HYDROCHLORIDE 1 MG: 1 INJECTION, SOLUTION INTRAMUSCULAR; INTRAVENOUS; SUBCUTANEOUS at 04:19

## 2023-12-16 RX ADMIN — HYDROMORPHONE HYDROCHLORIDE 0.2 MG: 0.2 INJECTION, SOLUTION INTRAMUSCULAR; INTRAVENOUS; SUBCUTANEOUS at 11:45

## 2023-12-16 RX ADMIN — HYDROMORPHONE HYDROCHLORIDE 0.5 MG: 1 INJECTION, SOLUTION INTRAMUSCULAR; INTRAVENOUS; SUBCUTANEOUS at 14:44

## 2023-12-16 RX ADMIN — ONDANSETRON 4 MG: 2 INJECTION INTRAMUSCULAR; INTRAVENOUS at 15:40

## 2023-12-16 RX ADMIN — DEXTROSE AND SODIUM CHLORIDE 100 ML/HR: 5; .9 INJECTION, SOLUTION INTRAVENOUS at 17:51

## 2023-12-16 NOTE — PROGRESS NOTES
"Progress Note - General Surgery   Nico Cruz 28 y.o. male MRN: [id number]  Unit/Bed#: [address] [address] Encounter: [id number]    Assessment/Plan:    Nico Cruz is a 28 y.o. male with history of UC s/p colectomy and complicated abdominal surgical history who presented to the ED overnight with abdominal pain.  This year alone patient underwent conversion of end ileostomy to diverting loop ileostomy in April, dilatation of stricture near J pouch by endoscopist in ?September/October, and then approx 8 days ago had reversal of ileostomy. Since that time patient has had \"normal postoperative abdominal discomfort\" until last night patient states he had sudden onset of band like burning pain across his upper abdomen.  He states this pain was unlike anything he has experienced before and it did not feel like his previous small bowel obstructions.    CT on admission does show some inflammatory changes and fluid collection near the previous ileostomy site.  This may be related to postsurgical changes.  WBC count is 16    Patient states he is feeling much better this morning. He is still passing green liquid stool.  He denies any abdominal distention or vomiting.  His nausea is back to baseline.  Patient is actively searching currently for a new colorectal care team due to the fact that his surgeon he has been following with for over a decade has left [facility]. Patient states he wont be able to see his surgeon until February at the earliest when he established at his new hospital.    Trial clear liquid diet.  Follow up abdominal xray  Monitor into tomorrow   Internal medicine team following.  SQ lovenox and SCDs for DVT ppx.         Subjective/Objective     Subjective:  He is not refusing care at [facility]. Patient states if absolutely necessary he is ok with being transferred there, however his primary surgeon has in fact left that hospital network.  Patient states he is feeling much better this morning. His nausea is at baseline, no " "vomiting.   He has no new complaints.      Objective:   Blood pressure 130/80, pulse 101, temperature 97.9 °F (36.6 °C), resp. rate 16, height 5' 9\" (1.753 m), weight 72.6 kg (160 lb), SpO2 97%.,Body mass index is 23.63 kg/m².      Intake/Output Summary (Last 24 hours) at 12/16/2023 0955  Last data filed at 12/16/2023 0715  Gross per 24 hour   Intake 250 ml   Output --   Net 250 ml       Invasive Devices       Peripheral Intravenous Line  Duration             Peripheral IV 12/15/23 Right;Upper;Ventral (anterior) Arm <1 day              Drain  Duration             Ileostomy Continent (Abdominal pouch)  days                    Physical Exam: /80   Pulse 101   Temp 97.9 °F (36.6 °C)   Resp 16   Ht 5' 9\" (1.753 m)   Wt 72.6 kg (160 lb)   SpO2 97%   BMI 23.63 kg/m²   General appearance: alert and oriented, in no acute distress  Head: Normocephalic, without obvious abnormality, atraumatic  Lungs: clear to auscultation bilaterally  Heart: regular rate and rhythm, S1, S2 normal, no murmur, click, rub or gallop  Abdomen:  abdomen is soft, previous ostomy site is healing appropriately without any discharge or surrounding erythema , no guarding or tenderness    Lab, Imaging and other studies:I have personally reviewed pertinent lab results.  , CBC:   Lab Results   Component Value Date    WBC 16.12 (H) 12/16/2023    HGB 10.9 (L) 12/16/2023    HCT 36.3 (L) 12/16/2023    MCV 63 (L) 12/16/2023     (H) 12/16/2023    RBC 5.81 (H) 12/16/2023    MCH 18.8 (L) 12/16/2023    MCHC 30.0 (L) 12/16/2023    RDW 18.6 (H) 12/16/2023    MPV 8.3 (L) 12/16/2023    NRBC 0 12/16/2023   , CMP:   Lab Results   Component Value Date    SODIUM 135 12/16/2023    K 3.8 12/16/2023     12/16/2023    CO2 25 12/16/2023    BUN 11 12/16/2023    CREATININE 0.83 12/16/2023    CALCIUM 9.4 12/16/2023    AST 10 (L) 12/16/2023    ALT 13 12/16/2023    ALKPHOS 75 12/16/2023    EGFR 118 12/16/2023   , Coagulation:   Lab Results "   Component Value Date    INR 1.02 12/16/2023     VTE Pharmacologic Prophylaxis: lovenox sq  VTE Mechanical Prophylaxis: sequential compression device

## 2023-12-16 NOTE — OCCUPATIONAL THERAPY NOTE
Occupational Therapy Screen     Patient Name: Nico Cruz  Today's Date: 12/16/2023  Problem List  Principal Problem:    Partial small bowel obstruction (HCC)  Active Problems:    History of ulcerative colitis    Ankylosing spondylitis (HCC)    Past Medical History  Past Medical History:   Diagnosis Date    Ankylosing spondylitis (HCC)     Anxiety     Bowel obstruction (HCC)     Clostridium difficile colitis 9/13/2018    Colitis     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HCC) 9/13/2018    Pancreatitis     Ulcerative colitis (HCC)      Past Surgical History  Past Surgical History:   Procedure Laterality Date    COLECTOMY TOTAL      with ileal pouch and anastemosis    IR PICC PLACEMENT SINGLE LUMEN  3/1/2022    TOTAL COLECTOMY           12/16/23 0907   OT Last Visit   OT Visit Date 12/16/23  (Saturday)   Note Type   Note type Screen   Additional Comments OT orders received and chart review completed. Spoke w/ PTAbena. Pt is completing ADLs indpendently. No OT needs at this time. Will screen from OT caseload. Please re-consult OT as appropriate if needs arise. RADHA OT     Nikole Vance OTR/L  [id number]  [id number]

## 2023-12-16 NOTE — ASSESSMENT & PLAN NOTE
H/o IBD s/p colectomy with J pouch formation with 10/18 diverting ileostomy complicated by prolapse and localized intra-abdominal infection   Subsequently underwent Ileostomy takedown at [facility] 8 days ago   See plan in Partial SBO section

## 2023-12-16 NOTE — H&P
"[facility]  H&P  Name: Nico ANTONIO Cruz 28 y.o. male I MRN: [id number]  Unit/Bed#: [address] [place] [address] I Date of Admission: 12/15/2023   Date of Service: 12/16/2023 I Hospital Day: 0      Assessment/Plan   * Partial small bowel obstruction (HCC)  Assessment & Plan  Abdominal pain over last several days that worsened tonight   Afebrile, WBC 15K  CT A/P per Vrads: \" Status post internal ileostomy takedown.  Findings suspicious for partial small bowel obstruction with narrowed and thickened transitional segment with adjacent free fluid deep to previous ileostomy site.  Subcutaneous stranding deep to previous ileostomy which could reflect postoperative change, although cannot exclude superimposed cellulitis.  Further there is an irregular underlying subcutaneous fluid collection for which the differential diagnosis would include postsurgical hematoma, seroma, or abscess\"  History of IBD status post colectomy with J-pouch formation with diverting ileostomy placement in October 2023 complicated by prolapse and localized intra-abdominal infection and subsequently underwent ileostomy takedown at [facility] 8 days ago  Requiring numerous doses of pain medication in the ER  ER staff reached out to surgery on-call who reports patient refusing transfer to [facility] currently and as such ER staff reached out to our surgeons on-call who report patient could stay here  Spoke with surgery team on-call and spoke with them about patient's current primary surgical issue and joint decision that patient will be on surgery primary team but medicine team to do patient's admission H&P and take primary calls until 0700 on 12/16  Admit to surgery.  Strict NPO.  D5 LR at 125 cc/h.  Start Levaquin and Flagyl for possible infection.  Pain control.  Medicine team to follow along as consultants.     Ankylosing spondylitis (HCC)  Assessment & Plan  Noted    History of ulcerative colitis  Assessment & Plan  H/o IBD s/p colectomy with J pouch " formation with 10/18 diverting ileostomy complicated by prolapse and localized intra-abdominal infection   Subsequently underwent Ileostomy takedown at [facility] 8 days ago   See plan in Partial SBO section               VTE Prophylaxis: sequential compression device and foot pump applied   Code Status: Level 1 - Full Code       Anticipated Length of Stay:  Patient will be admitted on an Inpatient basis with an anticipated length of stay of  > 2 midnights.   Justification for Hospital Stay: Please see detailed plans noted above.    Chief Complaint:     Abdominal pain  History of Present Illness:  Nico Cruz is a 28 y.o. male who has past medical history significant for IBD status post colectomy with J-pouch formation as well as surgery in October 2023 for diverting ileostomy that was complicated by prolapse and localized intra-abdominal infection and subsequently underwent diverting ileostomy reversal 8 days ago, ankylosing spondylitis, chronic pain who presented to [facility] ER on the early hours of 12/16 with symptoms of worsening abdominal pain over the last several days that became more severe this evening.  Patient reports that about 8 days ago he was at [facility] where his diverting ileostomy was reversed.  Patient denies any fevers at home but reports some nausea as well as severe abdominal pain.  In the ER, patient underwent CT imaging with concern for partial small bowel obstruction as well as possible infection.  Patient refused transfer back to [facility] per discussion with ER.  ER team reached out to surgery team who reports okay with admission here given circumstances.      Review of Systems:    Constitutional:  Denies fever or chills   Eyes:  Denies change in visual acuity   HENT:  Denies nasal congestion or sore throat   Respiratory:  Denies cough or shortness of breath   Cardiovascular:  Denies chest pain or edema   GI: Positive for abdominal pain  :  Denies dysuria   Musculoskeletal:  Denies back pain  or joint pain   Integument:  Denies rash   Neurologic:  Denies headache or sensory changes   Endocrine:  Denies polyuria or polydipsia   Lymphatic:  Denies swollen glands   Psychiatric:  Denies depression or anxiety     Past Medical and Surgical History:   Past Medical History:   Diagnosis Date    Ankylosing spondylitis (HCC)     Anxiety     Bowel obstruction (HCC)     Clostridium difficile colitis 9/13/2018    Colitis     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HCC) 9/13/2018    Pancreatitis     Ulcerative colitis (HCC)      Past Surgical History:   Procedure Laterality Date    COLECTOMY TOTAL      with ileal pouch and anastemosis    IR PICC PLACEMENT SINGLE LUMEN  3/1/2022    TOTAL COLECTOMY         Meds/Allergies:  Medications Prior to Admission   Medication Sig Dispense Refill Last Dose    lactated ringers Inject 125 mL/hr into a catheter in a vein at 125 mL/hr continuous (Patient not taking: Reported on 12/16/2023)  0 Not Taking    ondansetron (ZOFRAN) 4 mg/2 mL injection Inject 2 mL (4 mg total) into a catheter in a vein every 6 (six) hours as needed for nausea or vomiting (Patient not taking: Reported on 12/16/2023)  0 Not Taking    prochlorperazine (COMPAZINE) 10 mg tablet Take 1 tablet (10 mg total) by mouth every 6 (six) hours as needed for nausea or vomiting (Patient not taking: Reported on 12/16/2023) 30 tablet 0 Not Taking       Allergies:   Allergies   Allergen Reactions    Cefazolin Hives     Other reaction(s): Arrythmia (Abnormal Heartbeat), Rash Maculopapular    Mesalamine GI Intolerance     Other reaction(s): Pancreatitis  [facility] - [id number]: pancreatitis    Silver Rash     Coloplast ostomy Products    Wound Dressings Rash     Coloplast ostomy Products        History:  Marital Status: Single     Substance Use History:   Social History     Substance and Sexual Activity   Alcohol Use Not Currently    Comment: pt states 5 a month/socially     Social History     Tobacco Use   Smoking Status Never   Smokeless Tobacco  "Never     Social History     Substance and Sexual Activity   Drug Use No       Family History:  Family History   Problem Relation Age of Onset    Lung disease Neg Hx        Physical Exam:     Vitals:   Blood Pressure: 127/93 (12/16/23 0325)  Pulse: 86 (12/16/23 0325)  Temperature: 97.9 °F (36.6 °C) (12/16/23 0325)  Temp Source: Oral (12/16/23 0325)  Respirations: 18 (12/16/23 0325)  Height: 5' 9\" (175.3 cm) (12/15/23 1842)  Weight - Scale: 72.6 kg (160 lb) (12/15/23 1842)  SpO2: 97 % (12/16/23 0325)    Constitutional:  Alert and Oriented  Eyes:  EOMI, No scleral icterus   HENT:   oropharynx moist, external ears normal, external nose normal   Respiratory:  No respiratory distress, no wheezing   Cardiovascular:  tachycardia, no murmurs   GI:  Soft, diffuse tenderness, no guarding, no rigiditiy   :  No costovertebral angle tenderness   Musculoskeletal:  no tenderness, no deformities. Back- no tenderness  Integument:  no jaundice, no rash   Neurologic:  Alert &awake, communicative, CN 2-12 normal,  no focal deficits noted         Lab Results: I have personally reviewed pertinent reports.  , I have personally reviewed pertinent films in PACS, and I have personally reviewed pertinent films in PACS with a Radiologist.    Results from last 7 days   Lab Units 12/15/23  1847   WBC Thousand/uL 15.31*   HEMOGLOBIN g/dL 12.7   HEMATOCRIT % 43.0   PLATELETS Thousands/uL 514*   NEUTROS PCT % 71   LYMPHS PCT % 16   MONOS PCT % 10   EOS PCT % 1     Results from last 7 days   Lab Units 12/15/23  1847   POTASSIUM mmol/L 3.9   CHLORIDE mmol/L 102   CO2 mmol/L 25   BUN mg/dL 13   CREATININE mg/dL 0.97   CALCIUM mg/dL 9.7   ALK PHOS U/L 79   ALT U/L 15   AST U/L 10*             Imaging: I have personally reviewed pertinent reports.  , I have personally reviewed pertinent films in PACS, and I have personally reviewed pertinent films in PACS with a Radiologist.    No results found.    Total time for visit, including " counseling/coordination of care: 45 minutes. Greater than 50% of this total time spent on direct patient counseling and coorination of care.     Epic Records Reviewed as well as Records in Care Everywhere    ** Please Note: Dragon 360 Dictation voice to text software was used in the creation of this document. **

## 2023-12-16 NOTE — PHYSICAL THERAPY NOTE
Physical Therapy Screen    Patient Name: Nico Cruz    Today's Date: 12/16/2023     Problem List  Principal Problem:    Partial small bowel obstruction (HCC)  Active Problems:    History of ulcerative colitis    Ankylosing spondylitis (HCC)       Past Medical History  Past Medical History:   Diagnosis Date    Ankylosing spondylitis (HCC)     Anxiety     Bowel obstruction (HCC)     Clostridium difficile colitis 9/13/2018    Colitis     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HCC) 9/13/2018    Pancreatitis     Ulcerative colitis (HCC)         Past Surgical History  Past Surgical History:   Procedure Laterality Date    COLECTOMY TOTAL      with ileal pouch and anastemosis    IR PICC PLACEMENT SINGLE LUMEN  3/1/2022    TOTAL COLECTOMY            12/16/23 0852   PT Last Visit   PT Visit Date 12/16/23   Note Type   Note type Screen   Additional Comments PT orders received and chart review completed.  PT assessing patient's strength WFL and Pt is completing ADLs, bed mobility, transfers, and ambulation independently in hospital room. No PT or OT needs at this time. Please re-consult PT as appropriate if needs arise. DC PT   Licensure   [place] License Number  Abena Calderon PT, DPT [id number]

## 2023-12-16 NOTE — ED PROVIDER NOTES
History  Chief Complaint   Patient presents with    Abdominal Pain     Pt c/o 9/10 burning abdominal pain following surgery about 1 week ago     Patient is a 28-year-old male.  He has a history of ulcerative colitis.  He has had a complicated course.  He recently had reversal of his enterostomy.  This was done 8 days ago.  He had been doing well at home.  This afternoon he developed a burning abdominal pain to the right side of his abdomen.  He is nauseated but has not vomited.  He does have chronic diarrhea.  No hematochezia.  No fever or chills.  No chest pain or trouble breathing.  No constipation.  No urinary complaints.  The pain at this point is severe without relieving factors.    Patient's past medical history is significant for anxiety, ankylosing spondylitis, thalassemia minor, ulcerative colitis status post J-pouch creation in 2015 with recurrent small bowel obstruction/strictures c/b proximal limb evisceration requiring revision and sepsis requiring washout and J-pouch redo in 4/2023.  Patient had pneumoperitoneum after pouchoscopy with dilatation and stenting of colorectal stricture back in October.        Prior to Admission Medications   Prescriptions Last Dose Informant Patient Reported? Taking?   lactated ringers   No No   Sig: Inject 125 mL/hr into a catheter in a vein at 125 mL/hr continuous   ondansetron (ZOFRAN) 4 mg/2 mL injection   No No   Sig: Inject 2 mL (4 mg total) into a catheter in a vein every 6 (six) hours as needed for nausea or vomiting   prochlorperazine (COMPAZINE) 10 mg tablet   No No   Sig: Take 1 tablet (10 mg total) by mouth every 6 (six) hours as needed for nausea or vomiting      Facility-Administered Medications: None       Past Medical History:   Diagnosis Date    Ankylosing spondylitis (HCC)     Anxiety     Bowel obstruction (HCC)     Clostridium difficile colitis 9/13/2018    Colitis     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HCC) 9/13/2018    Pancreatitis     Ulcerative colitis (HCC)         Past Surgical History:   Procedure Laterality Date    COLECTOMY TOTAL      with ileal pouch and anastemosis    IR PICC PLACEMENT SINGLE LUMEN  3/1/2022    TOTAL COLECTOMY         Family History   Problem Relation Age of Onset    Lung disease Neg Hx      I have reviewed and agree with the history as documented.    E-Cigarette/Vaping    E-Cigarette Use Never User      E-Cigarette/Vaping Substances    Nicotine No     THC No     CBD No     Flavoring No     Other No     Unknown No      Social History     Tobacco Use    Smoking status: Never    Smokeless tobacco: Never   Vaping Use    Vaping status: Never Used   Substance Use Topics    Alcohol use: Not Currently     Comment: pt states 5 a month/socially    Drug use: No       Review of Systems   Constitutional:  Negative for chills and fever.   HENT:  Negative for rhinorrhea and sore throat.    Eyes:  Negative for pain, redness and visual disturbance.   Respiratory:  Negative for cough and shortness of breath.    Cardiovascular:  Negative for chest pain and leg swelling.   Gastrointestinal:  Positive for abdominal pain, diarrhea and nausea. Negative for vomiting.   Endocrine: Negative for polydipsia and polyuria.   Genitourinary:  Negative for dysuria, frequency and hematuria.   Musculoskeletal:  Negative for back pain and neck pain.   Skin:  Negative for rash and wound.   Allergic/Immunologic: Negative for immunocompromised state.   Neurological:  Negative for weakness, numbness and headaches.   Psychiatric/Behavioral:  Negative for hallucinations and suicidal ideas.    All other systems reviewed and are negative.      Physical Exam  Physical Exam  Vitals reviewed.   Constitutional:       General: He is in acute distress.      Appearance: He is not toxic-appearing.   HENT:      Head: Normocephalic and atraumatic.      Nose: Nose normal.      Mouth/Throat:      Mouth: Mucous membranes are moist.   Eyes:      General:         Right eye: No discharge.         Left eye:  No discharge.      Conjunctiva/sclera: Conjunctivae normal.   Cardiovascular:      Rate and Rhythm: Regular rhythm. Tachycardia present.      Pulses: Normal pulses.      Heart sounds: Normal heart sounds. No murmur heard.     No friction rub. No gallop.   Pulmonary:      Effort: Pulmonary effort is normal. No respiratory distress.      Breath sounds: Normal breath sounds. No stridor. No wheezing, rhonchi or rales.   Abdominal:      General: Bowel sounds are normal. There is no distension.      Palpations: Abdomen is soft.      Tenderness: There is generalized abdominal tenderness. There is no right CVA tenderness, left CVA tenderness, guarding or rebound.      Comments: Mild to moderate generalized abdominal tenderness without peritoneal signs.   Musculoskeletal:         General: No swelling, tenderness, deformity or signs of injury. Normal range of motion.      Cervical back: Normal range of motion and neck supple. No rigidity.      Right lower leg: No edema.      Left lower leg: No edema.      Comments: No calf pain or unilateral leg swelling   Skin:     General: Skin is warm and dry.      Coloration: Skin is not jaundiced or pale.      Findings: No bruising, erythema or rash.   Neurological:      General: No focal deficit present.      Mental Status: He is alert and oriented to person, place, and time.      Cranial Nerves: No facial asymmetry.      Sensory: No sensory deficit.      Motor: Motor function is intact.   Psychiatric:         Mood and Affect: Mood normal.         Behavior: Behavior normal.         Vital Signs  ED Triage Vitals [12/15/23 1842]   Temperature Pulse Respirations Blood Pressure SpO2   98.7 °F (37.1 °C) (!) 121 20 148/92 97 %      Temp Source Heart Rate Source Patient Position - Orthostatic VS BP Location FiO2 (%)   Oral Monitor Sitting Left arm --      Pain Score       9           Vitals:    12/15/23 1842   BP: 148/92   Pulse: (!) 121   Patient Position - Orthostatic VS: Sitting          Visual Acuity      ED Medications  Medications   sodium chloride 0.9 % infusion (has no administration in time range)   ondansetron (ZOFRAN) injection 4 mg (has no administration in time range)   HYDROmorphone (DILAUDID) injection 2 mg (has no administration in time range)   iohexol (OMNIPAQUE) 240 MG/ML solution 50 mL (has no administration in time range)       Diagnostic Studies  Results Reviewed       Procedure Component Value Units Date/Time    UA w Reflex to Microscopic w Reflex to Culture [[id number]]     Lab Status: No result Specimen: Urine     Comprehensive metabolic panel [[id number]]  (Abnormal) Collected: 12/15/23 1847    Lab Status: Final result Specimen: Blood from Arm, Right Updated: 12/15/23 1908     Sodium 136 mmol/L      Potassium 3.9 mmol/L      Chloride 102 mmol/L      CO2 25 mmol/L      ANION GAP 9 mmol/L      BUN 13 mg/dL      Creatinine 0.97 mg/dL      Glucose 86 mg/dL      Calcium 9.7 mg/dL      AST 10 U/L      ALT 15 U/L      Alkaline Phosphatase 79 U/L      Total Protein 8.4 g/dL      Albumin 4.9 g/dL      Total Bilirubin 0.62 mg/dL      eGFR 104 ml/min/1.73sq m     Narrative:      National Kid[employer] Disease Foundation guidelines for Chronic Kidney Disease (CKD):     Stage 1 with normal or high GFR (GFR > 90 mL/min/1.73 square meters)    Stage 2 Mild CKD (GFR = 60-89 mL/min/1.73 square meters)    Stage 3A Moderate CKD (GFR = 45-59 mL/min/1.73 square meters)    Stage 3B Moderate CKD (GFR = 30-44 mL/min/1.73 square meters)    Stage 4 Severe CKD (GFR = 15-29 mL/min/1.73 square meters)    Stage 5 End Stage CKD (GFR <15 mL/min/1.73 square meters)  Note: GFR calculation is accurate only with a steady state creatinine    Lipase [[id number]]  (Normal) Collected: 12/15/23 1847    Lab Status: Final result Specimen: Blood from Arm, Right Updated: 12/15/23 1908     Lipase 41 u/L     CBC and differential [[id number]]  (Abnormal) Collected: 12/15/23 1847    Lab Status: Final result Specimen: Blood from  Arm, Right Updated: 12/15/23 1852     WBC 15.31 Thousand/uL      RBC 6.59 Million/uL      Hemoglobin 12.7 g/dL      Hematocrit 43.0 %      MCV 65 fL      MCH 19.3 pg      MCHC 29.5 g/dL      RDW 19.9 %      MPV 7.9 fL      Platelets 514 Thousands/uL      nRBC 0 /100 WBCs      Neutrophils Relative 71 %      Immat GRANS % 1 %      Lymphocytes Relative 16 %      Monocytes Relative 10 %      Eosinophils Relative 1 %      Basophils Relative 1 %      Neutrophils Absolute 10.92 Thousands/µL      Immature Grans Absolute 0.09 Thousand/uL      Lymphocytes Absolute 2.45 Thousands/µL      Monocytes Absolute 1.53 Thousand/µL      Eosinophils Absolute 0.20 Thousand/µL      Basophils Absolute 0.12 Thousands/µL                    CT abdomen pelvis with contrast    (Results Pending)              Procedures  Procedures         ED Course                               SBIRT 22yo+      Flowsheet Row Most Recent Value   Initial Alcohol Screen: US AUDIT-C     1. How often do you have a drink containing alcohol? 0 Filed at: 12/15/2023 1844   2. How many drinks containing alcohol do you have on a typical day you are drinking?  0 Filed at: 12/15/2023 1844   3a. Male UNDER 65: How often do you have five or more drinks on one occasion? 0 Filed at: 12/15/2023 1844   3b. FEMALE Any Age, or MALE 65+: How often do you have 4 or more drinks on one occassion? 0 Filed at: 12/15/2023 1844   Audit-C Score 0 Filed at: 12/15/2023 1844   ROD: How many times in the past year have you...    Used an illegal drug or used a prescription medication for non-medical reasons? Never Filed at: 12/15/2023 1844                      Medical Decision Making  Amount and/or Complexity of Data Reviewed  Labs: ordered.  Radiology: ordered.    Risk  Prescription drug management.             Disposition  Final diagnoses:   None     ED Disposition       None          Follow-up Information    None         Patient's Medications   Discharge Prescriptions    No medications on  file       No discharge procedures on file.    PDMP Review         Value Time User    PDMP Reviewed  Yes 8/10/2023 11:56 PM Smooth Dietz DO            ED Provider  Electronically Signed by             Fletcher Love MD  12/16/23 2169

## 2023-12-16 NOTE — QUICK NOTE
-Discussed with Surgery Team. Patient will be admitted to Surgery Primary Team but will place medicine consultation. I will see patient now and do his H&P and monitor patient overnight until 7am. If patient with anything for which surgery team needed overnight, I will reach out to them. Patient with hx of numerous surgical surgeries and recently underwent reversal of enterostomy. Admitted with findings suspicious for partial sbo on CT.

## 2023-12-16 NOTE — PROGRESS NOTES
"[facility]  Progress Note  Name: Nico Cruz I  MRN: [id number]  Unit/Bed#: [address] [place] [address] I Date of Admission: 12/15/2023   Date of Service: 12/16/2023 I Hospital Day: 0    Assessment/Plan   * Partial small bowel obstruction (HCC)  Assessment & Plan  Abdominal pain over last several days that worsened evening of admission  Afebrile, WBC 15K  CT A/P per Vrads: \" Status post internal ileostomy takedown.  Findings suspicious for partial small bowel obstruction with narrowed and thickened transitional segment with adjacent free fluid deep to previous ileostomy site.  Subcutaneous stranding deep to previous ileostomy which could reflect postoperative change, although cannot exclude superimposed cellulitis.  Further there is an irregular underlying subcutaneous fluid collection for which the differential diagnosis would include postsurgical hematoma, seroma, or abscess\"  History of IBD status post colectomy with J-pouch formation with diverting ileostomy placement in October 2023 complicated by prolapse and localized intra-abdominal infection and subsequently underwent ileostomy takedown at [facility] 8 days ago  Admitted to surgery.   Currently clear liquid diet.   On Levaquin and Flagyl IV  On D5 NS at 100cc/hr.   Plans per surgery primary; follow up pending abdominal x ray  Pain management with IV Dilaudid  Patient does report feeling better after receiving IV hydration overnight  Continues to have liquid green stool  Nausea is at his baseline    History of ulcerative colitis  Assessment & Plan  H/o IBD s/p colectomy with J pouch formation with 10/18 diverting ileostomy complicated by prolapse and localized intra-abdominal infection   Subsequently underwent Ileostomy takedown at [facility] 8 days ago   See plan in Partial SBO section    Ankylosing spondylitis (HCC)  Assessment & Plan  Noted               VTE Pharmacologic Prophylaxis: VTE Score: 1 Moderate Risk (Score 3-4) - Pharmacological DVT Prophylaxis " Ordered: enoxaparin (Lovenox).    Mobility:   Basic Mobility Inpatient Raw Score: 24  JH-HLM Goal: 8: Walk 250 feet or more  JH-HLM Achieved: 8: Walk 250 feet ot more  HLM Goal achieved. Continue to encourage appropriate mobility.    Patient Centered Rounds: I performed bedside rounds with nursing staff today.   Discussions with Specialists or Other Care Team Provider: Surgery, case management    Education and Discussions with Family / Patient: Updated  (father and mother) via phone.    Total Time Spent on Date of Encounter in care of patient: Greater than 45 mins. This time was spent on one or more of the following: performing physical exam; counseling and coordination of care; obtaining or reviewing history; documenting in the medical record; reviewing/ordering tests, medications or procedures; communicating with other healthcare professionals and discussing with patient's family/caregivers.    Current Length of Stay: 0 day(s)  Current Patient Status: Inpatient   Certification Statement: The patient will continue to require additional inpatient hospital stay due to IV antibiotics, repeat CBC in a.m., clear liquid diet, monitor tolerance, serial abdominal exam  Discharge Plan: Anticipate discharge in 24-48 hrs to home.    Code Status: Level 1 - Full Code    Subjective:   Patient seen sitting up in bed, his parents are on his cell on speaker phone.  He states that he feels better than when he first came in and that the pain is more manageable.  Feels that his nausea is back at baseline.  Is asking if he could potentially have clear liquid diet to see how he does, states this does not feel like the pain that he has when he has a bowel obstruction.    Objective:     Vitals:   Temp (24hrs), Av.1 °F (36.7 °C), Min:97.9 °F (36.6 °C), Max:98.7 °F (37.1 °C)    Temp:  [97.9 °F (36.6 °C)-98.7 °F (37.1 °C)] 97.9 °F (36.6 °C)  HR:  [] 101  Resp:  [16-20] 16  BP: (127-152)/(80-93) 130/80  SpO2:  [97 %-98  %] 97 %  Body mass index is 23.63 kg/m².     Input and Output Summary (last 24 hours):     Intake/Output Summary (Last 24 hours) at 12/16/2023 1532  Last data filed at 12/16/2023 0715  Gross per 24 hour   Intake 250 ml   Output --   Net 250 ml       Physical Exam:   Physical Exam  Vitals and nursing note reviewed.   HENT:      Head: Normocephalic.      Nose: Nose normal.      Mouth/Throat:      Mouth: Mucous membranes are moist.   Eyes:      Extraocular Movements: Extraocular movements intact.      Conjunctiva/sclera: Conjunctivae normal.   Cardiovascular:      Rate and Rhythm: Normal rate and regular rhythm.      Pulses: Normal pulses.      Heart sounds: Normal heart sounds.   Pulmonary:      Effort: Pulmonary effort is normal.      Breath sounds: Normal breath sounds.   Abdominal:      General: Bowel sounds are normal.      Palpations: Abdomen is soft.      Comments: Patient has mid abdominal wound with dressing noted, some mild erythema noted.  Reports that he has been having some liquid green stool   Genitourinary:     Comments: Voiding spontaneously  Musculoskeletal:         General: Normal range of motion.      Cervical back: Normal range of motion.      Right lower leg: No edema.      Left lower leg: No edema.   Skin:     General: Skin is warm and dry.      Capillary Refill: Capillary refill takes less than 2 seconds.      Comments: Mid abdominal surgical wound present with dressing   Neurological:      General: No focal deficit present.      Mental Status: He is alert and oriented to person, place, and time.   Psychiatric:         Mood and Affect: Mood normal.         Behavior: Behavior normal.         Thought Content: Thought content normal.         Judgment: Judgment normal.          Additional Data:     Labs:  Results from last 7 days   Lab Units 12/16/23  0450   WBC Thousand/uL 16.12*   HEMOGLOBIN g/dL 10.9*   HEMATOCRIT % 36.3*   PLATELETS Thousands/uL 503*   NEUTROS PCT % 78*   LYMPHS PCT % 11*   MONOS  PCT % 9   EOS PCT % 1     Results from last 7 days   Lab Units 12/16/23  0450   SODIUM mmol/L 135   POTASSIUM mmol/L 3.8   CHLORIDE mmol/L 102   CO2 mmol/L 25   BUN mg/dL 11   CREATININE mg/dL 0.83   ANION GAP mmol/L 8   CALCIUM mg/dL 9.4   ALBUMIN g/dL 4.4   TOTAL BILIRUBIN mg/dL 0.74   ALK PHOS U/L 75   ALT U/L 13   AST U/L 10*   GLUCOSE RANDOM mg/dL 93     Results from last 7 days   Lab Units 12/16/23  0450   INR  1.02     Results from last 7 days   Lab Units 12/16/23  1115 12/16/23  0725   POC GLUCOSE mg/dl 112 84               Lines/Drains:  Invasive Devices       Peripheral Intravenous Line  Duration             Peripheral IV 12/15/23 Right;Upper;Ventral (anterior) Arm <1 day              Drain  Duration             Ileostomy Continent (Abdominal pouch)  days                          Imaging: No pertinent imaging reviewed. Abdominal x ray pending    Recent Cultures (last 7 days):         Last 24 Hours Medication List:   Current Facility-Administered Medications   Medication Dose Route Frequency Provider Last Rate    dextrose 5 % and sodium chloride 0.9 %  100 mL/hr Intravenous Continuous Tristin Rivas PA-C 100 mL/hr (12/16/23 1050)    enoxaparin  40 mg Subcutaneous Q24H KAITLIN Tristin Rivas PA-C      HYDROmorphone  0.5 mg Intravenous Q6H PRN Greg Dang DO      HYDROmorphone  0.2 mg Intravenous Q6H PRN Greg Dang DO      ondansetron  4 mg Intravenous Q6H PRN Greg Dang DO          Today, Patient Was Seen By: KY Freeman    **Please Note: This note may have been constructed using a voice recognition system.**

## 2023-12-16 NOTE — ASSESSMENT & PLAN NOTE
H/o IBD s/p colectomy with J pouch formation with 10/18 diverting ileostomy complicated by prolapse and localized intra-abdominal infection   Subsequently underwent Ileostomy takedown at [facility] 8 days ago   See plan in Partial SBO section

## 2023-12-16 NOTE — CONSULTS
"[facility]  Consult  Name: Nico ALVAREZ Anthony 28 y.o. male I MRN: [id number]  Unit/Bed#: 2 [address] Date of Admission: 12/15/2023   Date of Service: 12/16/2023 I Hospital Day: 0    Inpatient consult to Internal Medicine  Consult performed by: Greg Dang DO  Consult ordered by: Greg Dang DO          Assessment/Plan   * Partial small bowel obstruction (HCC)  Assessment & Plan  Abdominal pain over last several days that worsened tonight   Afebrile, WBC 15K  CT A/P per Vrads: \" Status post internal ileostomy takedown.  Findings suspicious for partial small bowel obstruction with narrowed and thickened transitional segment with adjacent free fluid deep to previous ileostomy site.  Subcutaneous stranding deep to previous ileostomy which could reflect postoperative change, although cannot exclude superimposed cellulitis.  Further there is an irregular underlying subcutaneous fluid collection for which the differential diagnosis would include postsurgical hematoma, seroma, or abscess\"  History of IBD status post colectomy with J-pouch formation with diverting ileostomy placement in October 2023 complicated by prolapse and localized intra-abdominal infection and subsequently underwent ileostomy takedown at [facility] 8 days ago  Requiring numerous doses of pain medication in the ER  ER staff reached out to surgery on-call who reports patient refusing transfer to [facility] currently and as such ER staff reached out to our surgeons on-call who report patient could stay here  Spoke with surgery team on-call and spoke with them about patient's current primary surgical issue and joint decision that patient will be on surgery primary team but medicine team to do patient's admission H&P and take primary calls until 0700 on 12/16  Admitted to surgery. Currently strict NPO. On Levaquin and Flagyl. On D5 NS at 125cc/hr. Plans per surgery primary.   With respect to pain control, initially put on Dilaudid 0.5 q6h " prn moderate/severe pain and 0.2 mg dilaudid prn q6h severe pain. Patient reports that he normally requires 1mg every 4h prn moderate/severe pain. As such did inform patient I would give a one time 1mg IV dilaudid dose currently. Patient also reports that he did have a bowel movement several hours prior to coming in and denies any current vomiting. Spoke with patient that overnight Vrads gives CT reads but that our [facility] radiologists will read his CT imaging in the AM to give formal read.     Ankylosing spondylitis (HCC)  Assessment & Plan  Noted    History of ulcerative colitis  Assessment & Plan  H/o IBD s/p colectomy with J pouch formation with 10/18 diverting ileostomy complicated by prolapse and localized intra-abdominal infection   Subsequently underwent Ileostomy takedown at [facility] 8 days ago   See plan in Partial SBO section             Nicoyanelis Cruz is a 28 y.o. male who has past medical history significant for IBD status post colectomy with J-pouch formation, diverting ileostomy complicated by prolapse as well as localized intra-abdominal infection, recent ileostomy takedown at [facility] 8 days ago, chronic pain, ankylosing spondylitis who presented to [facility] ER on the evening of 12/15 with symptoms of severe abdominal pain that began several hours ago.  Patient also reports nausea but denies any vomiting.  Patient does report that he had a bowel movement a couple hours prior to coming in and when informed of CT read of partial small bowel obstruction, patient reports that he normally has vomiting and that it usually does not feel like this which surprised him.  Patient does report that the pain is severe despite 0.5 mg of Dilaudid being put on and that he normally requires 1 mg of Dilaudid every 3-4 hours.  Patient additionally reports nausea that is not well-controlled with Zofran.  Patient denies any fevers.  When asked about his surgery at [facility], patient reports that his surgeon who performed the  "surgery 8 days ago is now no longer with [facility] and is now at a different hospital.  He reports that he and the new surgeon at [facility] do not only see \"eye to eye\".    Review of Systems:    Constitutional:  Denies fever or chills   Eyes:  Denies change in visual acuity   HENT:  Denies nasal congestion or sore throat   Respiratory:  Denies cough or shortness of breath   Cardiovascular:  Denies chest pain or edema   GI:  Positive for abdominal pain and nausea   :  Denies dysuria   Musculoskeletal:  Denies back pain or joint pain   Integument:  Denies rash   Neurologic:  Denies headache, focal weakness or sensory changes   Endocrine:  Denies polyuria or polydipsia   Lymphatic:  Denies swollen glands   Psychiatric:  Denies depression or anxiety     Past Medical and Surgical History:     Past Medical History:   Diagnosis Date    Ankylosing spondylitis (HCC)     Anxiety     Bowel obstruction (HCC)     Clostridium difficile colitis 9/13/2018    Colitis     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HCC) 9/13/2018    Pancreatitis     Ulcerative colitis (HCC)        Past Surgical History:   Procedure Laterality Date    COLECTOMY TOTAL      with ileal pouch and anastemosis    IR PICC PLACEMENT SINGLE LUMEN  3/1/2022    TOTAL COLECTOMY         Meds/Allergies:    Medications Prior to Admission   Medication    lactated ringers    ondansetron (ZOFRAN) 4 mg/2 mL injection    prochlorperazine (COMPAZINE) 10 mg tablet       Allergies:   Allergies   Allergen Reactions    Cefazolin Hives     Other reaction(s): Arrythmia (Abnormal Heartbeat), Rash Maculopapular    Mesalamine GI Intolerance     Other reaction(s): Pancreatitis  Annotation - [id number]: pancreatitis    Silver Rash     Coloplast ostomy Products    Wound Dressings Rash     Coloplast ostomy Products      History:     Marital Status: Single    Substance Use History:   Social History     Substance and Sexual Activity   Alcohol Use Not Currently    Comment: pt states 5 a month/socially     Social History " "    Tobacco Use   Smoking Status Never   Smokeless Tobacco Never     Social History     Substance and Sexual Activity   Drug Use No       Family History:    Family History   Problem Relation Age of Onset    Lung disease Neg Hx        Physical Exam:     Vitals:   Blood Pressure: 127/93 (12/16/23 0325)  Pulse: 86 (12/16/23 0325)  Temperature: 97.9 °F (36.6 °C) (12/16/23 0325)  Temp Source: Oral (12/16/23 0325)  Respirations: 18 (12/16/23 0325)  Height: 5' 9\" (175.3 cm) (12/15/23 1842)  Weight - Scale: 72.6 kg (160 lb) (12/15/23 1842)  SpO2: 97 % (12/16/23 0325)    Constitutional:  A&Ox4  HENT:  Atraumatic, external ears normal, nose normal, oropharynx moist, no pharyngeal exudates. Neck- normal range of motion, no tenderness, supple   Respiratory:  No respiratory distress, normal breath sounds, no rales, no wheezing   Cardiovascular:  Normal rate, normal rhythm, no murmurs, no gallops, no rubs   GI:  Soft, nondistended, diffuse tenderness, no guarding, no rigidity  :  No costovertebral angle tenderness   Musculoskeletal:  No edema, no tenderness, no deformities. Back- no tenderness  Integument:  no rash  Lymphatic:  No lymphadenopathy noted   Neurologic:  Alert & oriented x 3, CN 2-12 normal, normal motor function, normal sensory function, no focal deficits noted         Lab Results: I Have Reviewed All Lab Data Below:    Results from last 7 days   Lab Units 12/15/23  1847   WBC Thousand/uL 15.31*   HEMOGLOBIN g/dL 12.7   HEMATOCRIT % 43.0   PLATELETS Thousands/uL 514*   NEUTROS PCT % 71   LYMPHS PCT % 16   MONOS PCT % 10   EOS PCT % 1     Results from last 7 days   Lab Units 12/15/23  1847   POTASSIUM mmol/L 3.9   CHLORIDE mmol/L 102   CO2 mmol/L 25   BUN mg/dL 13   CREATININE mg/dL 0.97   CALCIUM mg/dL 9.7   ALK PHOS U/L 79   ALT U/L 15   AST U/L 10*           * Additional Pertinent Lab Tests Reviewed: All Labs For Current Hospital Admission Reviewed    Imaging: I have personally reviewed pertinent reports.  , I " have personally reviewed pertinent films in PACS, and I have personally reviewed pertinent films in PACS with a Radiologist.    No results found.    Counseling / Coordination of Care Time: 45 minutes.  Greater than 50% of total time spent on patient counseling and coordination of care.    Collaboration of Care: Were Recommendations Directly Discussed with Primary Treatment Team? - Yes     ** Please Note: Dragon 360 Dictation speech to text software was used in the creation of this document **

## 2023-12-16 NOTE — ASSESSMENT & PLAN NOTE
"Abdominal pain over last several days that worsened tonight   Afebrile, WBC 15K  CT A/P per Vrads: \" Status post internal ileostomy takedown.  Findings suspicious for partial small bowel obstruction with narrowed and thickened transitional segment with adjacent free fluid deep to previous ileostomy site.  Subcutaneous stranding deep to previous ileostomy which could reflect postoperative change, although cannot exclude superimposed cellulitis.  Further there is an irregular underlying subcutaneous fluid collection for which the differential diagnosis would include postsurgical hematoma, seroma, or abscess\"  History of IBD status post colectomy with J-pouch formation with diverting ileostomy placement in October 2023 complicated by prolapse and localized intra-abdominal infection and subsequently underwent ileostomy takedown at [facility] 8 days ago  Requiring numerous doses of pain medication in the ER  ER staff reached out to surgery on-call who reports patient refusing transfer to [facility] currently and as such ER staff reached out to our surgeons on-call who report patient could stay here  Spoke with surgery team on-call and spoke with them about patient's current primary surgical issue and joint decision that patient will be on surgery primary team but medicine team to do patient's admission H&P and take primary calls until 0700 on 12/16  Admit to medicine.  Strict NPO.  D5 LR at 125 cc/h.  Start Levaquin and Flagyl for possible infection.  Pain control.  Medicine team to follow along as consultants.     "

## 2023-12-16 NOTE — PLAN OF CARE
Problem: Nutrition/Hydration-ADULT  Goal: Nutrient/Hydration intake appropriate for improving, restoring or maintaining nutritional needs  Description: Monitor and assess patient's nutrition/hydration status for malnutrition. Collaborate with interdisciplinary team and initiate plan and interventions as ordered.  Monitor patient's weight and dietary intake as ordered or per policy. Utilize nutrition screening tool and intervene as necessary. Determine patient's food preferences and provide high-protein, high-caloric foods as appropriate.     INTERVENTIONS:  - Monitor oral intake, urinary output, labs, and treatment plans  - Assess nutrition and hydration status and recommend course of action  - Evaluate amount of meals eaten  - Assist patient with eating if necessary   - Allow adequate time for meals  - Recommend/ encourage appropriate diets, oral nutritional supplements, and vitamin/mineral supplements  - Order, calculate, and assess calorie counts as needed  - Recommend, monitor, and adjust tube feedings and TPN/PPN based on assessed needs  - Assess need for intravenous fluids  - Provide specific nutrition/hydration education as appropriate  - Include patient/family/caregiver in decisions related to nutrition  12/16/2023 0854 by Susy Morales RN  Outcome: Progressing  12/16/2023 0853 by Susy Morales RN  Outcome: Progressing     Problem: PAIN - ADULT  Goal: Verbalizes/displays adequate comfort level or baseline comfort level  Description: Interventions:  - Encourage patient to monitor pain and request assistance  - Assess pain using appropriate pain scale  - Administer analgesics based on type and severity of pain and evaluate response  - Implement non-pharmacological measures as appropriate and evaluate response  - Consider cultural and social influences on pain and pain management  - Notify physician/advanced practitioner if interventions unsuccessful or patient reports new pain  Outcome:  Progressing     Problem: INFECTION - ADULT  Goal: Absence or prevention of progression during hospitalization  Description: INTERVENTIONS:  - Assess and monitor for signs and symptoms of infection  - Monitor lab/diagnostic results  - Monitor all insertion sites, i.e. indwelling lines, tubes, and drains  - Monitor endotracheal if appropriate and nasal secretions for changes in amount and color  - [place] appropriate cooling/warming therapies per order  - Administer medications as ordered  - Instruct and encourage patient and family to use good hand hygiene technique  - Identify and instruct in appropriate isolation precautions for identified infection/condition  Outcome: Progressing     Problem: SAFETY ADULT  Goal: Patient will remain free of falls  Description: INTERVENTIONS:  - Educate patient/family on patient safety including physical limitations  - Instruct patient to call for assistance with activity   - Consult OT/PT to assist with strengthening/mobility   - Keep Call bell within reach  - Keep bed low and locked with side rails adjusted as appropriate  - Keep care items and personal belongings within reach  - Initiate and maintain comfort rounds  - Make Fall Risk Sign visible to staff  - Offer Toileting every  Hours, in advance of need  - Initiate/Maintain alarm  - Obtain necessary fall risk management equipment:  - Apply yellow socks and bracelet for high fall risk patients  - Consider moving patient to room near nurses station  Outcome: Progressing     Problem: DISCHARGE PLANNING  Goal: Discharge to home or other facility with appropriate resources  Description: INTERVENTIONS:  - Identify barriers to discharge w/patient and caregiver  - Arrange for needed discharge resources and transportation as appropriate  - Identify discharge learning needs (meds, wound care, etc.)  - Arrange for interpretive services to assist at discharge as needed  - Refer to Case Management Department for coordinating discharge  planning if the patient needs post-hospital services based on physician/advanced practitioner order or complex needs related to functional status, cognitive ability, or social support system  Outcome: Progressing     Problem: GASTROINTESTINAL - ADULT  Goal: Minimal or absence of nausea and/or vomiting  Description: INTERVENTIONS:  - Administer IV fluids if ordered to ensure adequate hydration  - Maintain NPO status until nausea and vomiting are resolved  - Nasogastric tube if ordered  - Administer ordered antiemetic medications as needed  - Provide nonpharmacologic comfort measures as appropriate  - Advance diet as tolerated, if ordered  - Consider nutrition services referral to assist patient with adequate nutrition and appropriate food choices  Outcome: Progressing  Goal: Maintains or returns to baseline bowel function  Description: INTERVENTIONS:  - Assess bowel function  - Encourage oral fluids to ensure adequate hydration  - Administer IV fluids if ordered to ensure adequate hydration  - Administer ordered medications as needed  - Encourage mobilization and activity  - Consider nutritional services referral to assist patient with adequate nutrition and appropriate food choices  Outcome: Progressing  Goal: Maintains adequate nutritional intake  Description: INTERVENTIONS:  - Monitor percentage of each meal consumed  - Identify factors contributing to decreased intake, treat as appropriate  - Assist with meals as needed  - Monitor I&O, weight, and lab values if indicated  - Obtain nutrition services referral as needed  Outcome: Progressing

## 2023-12-16 NOTE — ASSESSMENT & PLAN NOTE
"Abdominal pain over last several days that worsened tonight   Afebrile, WBC 15K  CT A/P per Vrads: \" Status post internal ileostomy takedown.  Findings suspicious for partial small bowel obstruction with narrowed and thickened transitional segment with adjacent free fluid deep to previous ileostomy site.  Subcutaneous stranding deep to previous ileostomy which could reflect postoperative change, although cannot exclude superimposed cellulitis.  Further there is an irregular underlying subcutaneous fluid collection for which the differential diagnosis would include postsurgical hematoma, seroma, or abscess\"  History of IBD status post colectomy with J-pouch formation with diverting ileostomy placement in October 2023 complicated by prolapse and localized intra-abdominal infection and subsequently underwent ileostomy takedown at [facility] 8 days ago  Requiring numerous doses of pain medication in the ER  ER staff reached out to surgery on-call who reports patient refusing transfer to [facility] currently and as such ER staff reached out to our surgeons on-call who report patient could stay here  Spoke with surgery team on-call and spoke with them about patient's current primary surgical issue and joint decision that patient will be on surgery primary team but medicine team to do patient's admission H&P and take primary calls until 0700 on 12/16  Admitted to surgery. Currently strict NPO. On Levaquin and Flagyl. On D5 NS at 125cc/hr. Plans per surgery primary.   With respect to pain control, initially put on Dilaudid 0.5 q6h prn moderate/severe pain and 0.2 mg dilaudid prn q6h severe pain. Patient reports that he normally requires 1mg every 4h prn moderate/severe pain. As such did inform patient I would give a one time 1mg IV dilaudid dose currently. Patient also reports that he did have a bowel movement several hours prior to coming in and denies any current vomiting. Spoke with patient that overnight Vrads gives CT reads but that " our [facility] radiologists will read his CT imaging in the AM to give formal read.

## 2023-12-16 NOTE — ED CARE HANDOFF
Emergency Department Sign Out Note        Sign out and transfer of care from Dr. Love. See Separate Emergency Department note.     The patient, Nico ANTONIO Cruz, was evaluated by the previous provider for abdominal pain.    Workup Completed:  Labs    ED Course / Workup Pending (followup):  CT, UA                                  ED Course as of 12/16/23 1605   Fri Dec 15, 2023   2018 SO: Hx of UC with J pouch and multiple abdominal surgeries with complications; Reversed ileostomy 8 days ago in [place]; Abd pain and nausea; Diffusely tender on exam; Got dilaudid and Zofran; Awaiting CT and UA; DC if negative   2210 Pt in CT.    Sat Dec 16, 2023   0050 Awaiting CT read.    0113 CT abdomen pelvis with contrast  Per vrads:  1. Status post interval ileostomy takedown. 2. Findings suspicious for partial small bowel obstruction with narrowed and thickened transitional segment with adjacent free fluid deep to previous ileostomy site. 3. Subcutaneous stranding deep to previous ileostomy which could reflect postoperative change, although cannot exclude superimposed cellulitis. Further, there is an irregular underlying subcutaneous fluid collection for which the differential diagnosis would include postsurgical hematoma, seroma, or abscess.    0120 Pt made aware of results. Pt stating that his surgeon recently left [facility] and he would not like to be transferred back as he is not a fan of the other partners there. Will discuss with surgery here for possible admission.    0124 Surgery contacted via TT.    0148 Surgery contacted again via TT. Awaiting response.    0155 Surgery to follow but requesting medicine admission. [facility] to be made aware.      Procedures  Medical Decision Making  Pt is a 27yo M who presents with abdominal pain.     See ED course for results and details.    Plan to admit pt to [facility]. Pt discussed with admitting team and admission orders placed. Pt admitted without incident.         Amount and/or Complexity of Data  Reviewed  Labs: ordered.  Radiology: ordered. Decision-making details documented in ED Course.    Risk  Prescription drug management.  Decision regarding hospitalization.            Disposition  Final diagnoses:   Abdominal pain   H/O major abdominal surgery   Abnormal CT scan     Time reflects when diagnosis was documented in both MDM as applicable and the Disposition within this note       Time User Action Codes Description Comment    12/16/2023  1:56 AM Abigali De Souza [R10.9] Abdominal pain     12/16/2023  1:56 AM Abigail De Souza [Z98.890] H/O major abdominal surgery     12/16/2023  1:56 AM Abigail De Souza [R93.89] Abnormal CT scan           ED Disposition       ED Disposition   Admit    Condition   Stable    Date/Time   Sat Dec 16, 2023  1:56 AM    Comment   Case was discussed with [facility] and the patient's admission status was agreed to be Admission Status: observation status to the service of Dr. Dang.               Follow-up Information    None       Current Discharge Medication List        CONTINUE these medications which have NOT CHANGED    Details   lactated ringers Inject 125 mL/hr into a catheter in a vein at 125 mL/hr continuous  Refills: 0    Associated Diagnoses: Pneumoperitoneum      ondansetron (ZOFRAN) 4 mg/2 mL injection Inject 2 mL (4 mg total) into a catheter in a vein every 6 (six) hours as needed for nausea or vomiting  Refills: 0    Associated Diagnoses: Pneumoperitoneum      prochlorperazine (COMPAZINE) 10 mg tablet Take 1 tablet (10 mg total) by mouth every 6 (six) hours as needed for nausea or vomiting  Qty: 30 tablet, Refills: 0    Comments: Call pt when ready  Associated Diagnoses: Nausea           No discharge procedures on file.       ED Provider  Electronically Signed by     Abigail De Souza MD  12/16/23 [id number]

## 2023-12-16 NOTE — ASSESSMENT & PLAN NOTE
H/o IBD s/p colectomy with J pouch formation with 10/18 diverting ileostomy complicated by prolapse and localized intra-abdominal infection   Subsequently underwent Ileostomy takedown at [facility] 8 days ago   See plan in Partial SBO section

## 2023-12-17 VITALS
WEIGHT: 160 LBS | HEIGHT: 69 IN | SYSTOLIC BLOOD PRESSURE: 117 MMHG | RESPIRATION RATE: 14 BRPM | TEMPERATURE: 97.9 F | DIASTOLIC BLOOD PRESSURE: 77 MMHG | HEART RATE: 70 BPM | BODY MASS INDEX: 23.7 KG/M2 | OXYGEN SATURATION: 98 %

## 2023-12-17 LAB
ANION GAP SERPL CALCULATED.3IONS-SCNC: 4 MMOL/L
BASOPHILS # BLD AUTO: 0.06 THOUSANDS/ÂΜL (ref 0–0.1)
BASOPHILS NFR BLD AUTO: 1 % (ref 0–1)
BUN SERPL-MCNC: 7 MG/DL (ref 5–25)
CALCIUM SERPL-MCNC: 8.8 MG/DL (ref 8.4–10.2)
CHLORIDE SERPL-SCNC: 106 MMOL/L (ref 96–108)
CO2 SERPL-SCNC: 27 MMOL/L (ref 21–32)
CREAT SERPL-MCNC: 0.72 MG/DL (ref 0.6–1.3)
EOSINOPHIL # BLD AUTO: 0.15 THOUSAND/ÂΜL (ref 0–0.61)
EOSINOPHIL NFR BLD AUTO: 2 % (ref 0–6)
ERYTHROCYTE [DISTWIDTH] IN BLOOD BY AUTOMATED COUNT: 17.9 % (ref 11.6–15.1)
GFR SERPL CREATININE-BSD FRML MDRD: 125 ML/MIN/1.73SQ M
GLUCOSE SERPL-MCNC: 89 MG/DL (ref 65–140)
GLUCOSE SERPL-MCNC: 92 MG/DL (ref 65–140)
HCT VFR BLD AUTO: 33.1 % (ref 36.5–49.3)
HGB BLD-MCNC: 9.7 G/DL (ref 12–17)
IMM GRANULOCYTES # BLD AUTO: 0.05 THOUSAND/UL (ref 0–0.2)
IMM GRANULOCYTES NFR BLD AUTO: 1 % (ref 0–2)
LYMPHOCYTES # BLD AUTO: 1.52 THOUSANDS/ÂΜL (ref 0.6–4.47)
LYMPHOCYTES NFR BLD AUTO: 15 % (ref 14–44)
MAGNESIUM SERPL-MCNC: 2 MG/DL (ref 1.9–2.7)
MCH RBC QN AUTO: 18.7 PG (ref 26.8–34.3)
MCHC RBC AUTO-ENTMCNC: 29.3 G/DL (ref 31.4–37.4)
MCV RBC AUTO: 64 FL (ref 82–98)
MONOCYTES # BLD AUTO: 0.99 THOUSAND/ÂΜL (ref 0.17–1.22)
MONOCYTES NFR BLD AUTO: 10 % (ref 4–12)
NEUTROPHILS # BLD AUTO: 7.26 THOUSANDS/ÂΜL (ref 1.85–7.62)
NEUTS SEG NFR BLD AUTO: 71 % (ref 43–75)
NRBC BLD AUTO-RTO: 0 /100 WBCS
PLATELET # BLD AUTO: 448 THOUSANDS/UL (ref 149–390)
PMV BLD AUTO: 8.6 FL (ref 8.9–12.7)
POTASSIUM SERPL-SCNC: 4 MMOL/L (ref 3.5–5.3)
RBC # BLD AUTO: 5.2 MILLION/UL (ref 3.88–5.62)
SODIUM SERPL-SCNC: 137 MMOL/L (ref 135–147)
WBC # BLD AUTO: 10.03 THOUSAND/UL (ref 4.31–10.16)

## 2023-12-17 PROCEDURE — 83735 ASSAY OF MAGNESIUM: CPT | Performed by: PHYSICIAN ASSISTANT

## 2023-12-17 PROCEDURE — 80048 BASIC METABOLIC PNL TOTAL CA: CPT | Performed by: PHYSICIAN ASSISTANT

## 2023-12-17 PROCEDURE — 82948 REAGENT STRIP/BLOOD GLUCOSE: CPT

## 2023-12-17 PROCEDURE — 99232 SBSQ HOSP IP/OBS MODERATE 35: CPT | Performed by: NURSE PRACTITIONER

## 2023-12-17 PROCEDURE — 85025 COMPLETE CBC W/AUTO DIFF WBC: CPT | Performed by: PHYSICIAN ASSISTANT

## 2023-12-17 PROCEDURE — 99238 HOSP IP/OBS DSCHRG MGMT 30/<: CPT | Performed by: PHYSICIAN ASSISTANT

## 2023-12-17 RX ORDER — PROCHLORPERAZINE MALEATE 5 MG/1
5 TABLET ORAL EVERY 6 HOURS PRN
Status: DISCONTINUED | OUTPATIENT
Start: 2023-12-17 | End: 2023-12-17 | Stop reason: HOSPADM

## 2023-12-17 RX ORDER — OXYCODONE HYDROCHLORIDE 5 MG/1
5 TABLET ORAL EVERY 4 HOURS PRN
Status: DISCONTINUED | OUTPATIENT
Start: 2023-12-17 | End: 2023-12-17 | Stop reason: HOSPADM

## 2023-12-17 RX ORDER — HYDROMORPHONE HCL IN WATER/PF 6 MG/30 ML
0.2 PATIENT CONTROLLED ANALGESIA SYRINGE INTRAVENOUS ONCE
Status: COMPLETED | OUTPATIENT
Start: 2023-12-17 | End: 2023-12-17

## 2023-12-17 RX ADMIN — DEXTROSE AND SODIUM CHLORIDE 100 ML/HR: 5; .9 INJECTION, SOLUTION INTRAVENOUS at 01:20

## 2023-12-17 RX ADMIN — HYDROMORPHONE HYDROCHLORIDE 0.5 MG: 1 INJECTION, SOLUTION INTRAMUSCULAR; INTRAVENOUS; SUBCUTANEOUS at 01:20

## 2023-12-17 RX ADMIN — ENOXAPARIN SODIUM 40 MG: 40 INJECTION SUBCUTANEOUS at 08:17

## 2023-12-17 RX ADMIN — HYDROMORPHONE HYDROCHLORIDE 0.2 MG: 0.2 INJECTION, SOLUTION INTRAMUSCULAR; INTRAVENOUS; SUBCUTANEOUS at 05:53

## 2023-12-17 RX ADMIN — HYDROMORPHONE HYDROCHLORIDE 0.2 MG: 0.2 INJECTION, SOLUTION INTRAMUSCULAR; INTRAVENOUS; SUBCUTANEOUS at 08:24

## 2023-12-17 NOTE — NURSING NOTE
Patient discharged to home. Discharge instructions discussed with patient. Patient verbalized understanding. VSS/afebrile. No c/o pain or discomfort upon discharge. Pt stable at time of discharge.

## 2023-12-17 NOTE — ASSESSMENT & PLAN NOTE
H/o IBD s/p colectomy with J pouch formation with 10/18 diverting ileostomy complicated by prolapse and localized intra-abdominal infection   Subsequently underwent Ileostomy takedown at [facility] 9 days ago   Patient is being discharged to home today.  Will follow-up with his outpatient PCP 1 to 2 weeks postdischarge.  He will also follow-up with a colorectal surgeon of his choosing postdischarge.

## 2023-12-17 NOTE — DISCHARGE SUMMARY
Discharge Summary - Nico Cruz 28 y.o. male MRN: [id number]    Unit/Bed#: [address] [place] [address] Encounter: [id number]    Admission Date: 12/15/2023   Discharge Date: 12/17/2023    Admitting Diagnosis:   Abdominal pain [R10.9]  Abnormal CT scan [R93.89]  H/O major abdominal surgery [Z98.890]    Discharge Diagnoses: Principal Problem:    Partial small bowel obstruction (HCC)  Active Problems:    History of ulcerative colitis    Ankylosing spondylitis (HCC)      Consultations:  hospitalists    Procedures Performed: n/a    HPI per admission H&P:  Nico Cruz is a 28 y.o. male who has past medical history significant for IBD status post colectomy with J-pouch formation as well as surgery in October 2023 for diverting ileostomy that was complicated by prolapse and localized intra-abdominal infection and subsequently underwent diverting ileostomy reversal 8 days ago, ankylosing spondylitis, chronic pain who presented to [facility] ER on the early hours of 12/16 with symptoms of worsening abdominal pain over the last several days that became more severe this evening.  Patient reports that about 8 days ago he was at [facility] where his diverting ileostomy was reversed.  Patient denies any fevers at home but reports some nausea as well as severe abdominal pain.  In the ER, patient underwent CT imaging with concern for partial small bowel obstruction as well as possible infection.  Patient refused transfer back to [facility] per discussion with ER.  ER team reached out to surgery team who reports okay with admission here given circumstances.       Hospital Course: Nico Cruz is a 28 y.o. male admitted for abdominal pain. On CT scan on admission there was concern for mild small bowel wall thickening and mild intermittent small bowel distention possibly reflecting ileus vs ileitis. After oral contrast administration patient reported that his symptoms resolved. Patient's leukocytosis that was present on admission resolved on  hospital day 1. He continues to have loose liquid bowel movements which is not unusual after ileostomy takedown. Follow up abdominal x-ray revealed similar findings of mild distention.  Patient tolerated advance in diet without vomiting. Patient's nausea returned to baseline. In light of patient's original surgeon moving hospital networks he will seek follow-up care with colorectal in this area for now.        Condition at Discharge: fair     Discharge instructions/Information to patient and family:   See after visit summary for information provided to patient and family.      Provisions for Follow-Up Care:  See after visit summary for information related to follow-up care and any pertinent home health orders.      Disposition: Home    Planned Readmission: No    Discharge Statement   I spent 20 minutes discharging the patient. This time was spent on the day of discharge. I had direct contact with the patient on the day of discharge. Additional documentation is required if more than 30 minutes were spent on discharge.     Discharge Medications:  See after visit summary for reconciled discharge medications provided to patient and family.

## 2023-12-17 NOTE — PLAN OF CARE
Problem: Nutrition/Hydration-ADULT  Goal: Nutrient/Hydration intake appropriate for improving, restoring or maintaining nutritional needs  Description: Monitor and assess patient's nutrition/hydration status for malnutrition. Collaborate with interdisciplinary team and initiate plan and interventions as ordered.  Monitor patient's weight and dietary intake as ordered or per policy. Utilize nutrition screening tool and intervene as necessary. Determine patient's food preferences and provide high-protein, high-caloric foods as appropriate.     INTERVENTIONS:  - Monitor oral intake, urinary output, labs, and treatment plans  - Assess nutrition and hydration status and recommend course of action  - Evaluate amount of meals eaten  - Assist patient with eating if necessary   - Allow adequate time for meals  - Recommend/ encourage appropriate diets, oral nutritional supplements, and vitamin/mineral supplements  - Order, calculate, and assess calorie counts as needed  - Recommend, monitor, and adjust tube feedings and TPN/PPN based on assessed needs  - Assess need for intravenous fluids  - Provide specific nutrition/hydration education as appropriate  - Include patient/family/caregiver in decisions related to nutrition  Outcome: Progressing     Problem: PAIN - ADULT  Goal: Verbalizes/displays adequate comfort level or baseline comfort level  Description: Interventions:  - Encourage patient to monitor pain and request assistance  - Assess pain using appropriate pain scale  - Administer analgesics based on type and severity of pain and evaluate response  - Implement non-pharmacological measures as appropriate and evaluate response  - Consider cultural and social influences on pain and pain management  - Notify physician/advanced practitioner if interventions unsuccessful or patient reports new pain  Outcome: Progressing     Problem: INFECTION - ADULT  Goal: Absence or prevention of progression during  hospitalization  Description: INTERVENTIONS:  - Assess and monitor for signs and symptoms of infection  - Monitor lab/diagnostic results  - Monitor all insertion sites, i.e. indwelling lines, tubes, and drains  - Monitor endotracheal if appropriate and nasal secretions for changes in amount and color  - [place] appropriate cooling/warming therapies per order  - Administer medications as ordered  - Instruct and encourage patient and family to use good hand hygiene technique  - Identify and instruct in appropriate isolation precautions for identified infection/condition  Outcome: Progressing     Problem: SAFETY ADULT  Goal: Patient will remain free of falls  Description: INTERVENTIONS:  - Educate patient/family on patient safety including physical limitations  - Instruct patient to call for assistance with activity   - Consult OT/PT to assist with strengthening/mobility   - Keep Call bell within reach  - Keep bed low and locked with side rails adjusted as appropriate  - Keep care items and personal belongings within reach  - Initiate and maintain comfort rounds  - Make Fall Risk Sign visible to staff  - Offer Toileting every 2 Hours, in advance of need  - Obtain necessary fall risk management equipment: socks  - Apply yellow socks and bracelet for high fall risk patients  - Consider moving patient to room near nurses station  Outcome: Progressing     Problem: DISCHARGE PLANNING  Goal: Discharge to home or other facility with appropriate resources  Description: INTERVENTIONS:  - Identify barriers to discharge w/patient and caregiver  - Arrange for needed discharge resources and transportation as appropriate  - Identify discharge learning needs (meds, wound care, etc.)  - Arrange for interpretive services to assist at discharge as needed  - Refer to Case Management Department for coordinating discharge planning if the patient needs post-hospital services based on physician/advanced practitioner order or complex needs  related to functional status, cognitive ability, or social support system  Outcome: Progressing     Problem: GASTROINTESTINAL - ADULT  Goal: Minimal or absence of nausea and/or vomiting  Description: INTERVENTIONS:  - Administer IV fluids if ordered to ensure adequate hydration  - Maintain NPO status until nausea and vomiting are resolved  - Nasogastric tube if ordered  - Administer ordered antiemetic medications as needed  - Provide nonpharmacologic comfort measures as appropriate  - Advance diet as tolerated, if ordered  - Consider nutrition services referral to assist patient with adequate nutrition and appropriate food choices  Outcome: Progressing  Goal: Maintains or returns to baseline bowel function  Description: INTERVENTIONS:  - Assess bowel function  - Encourage oral fluids to ensure adequate hydration  - Administer IV fluids if ordered to ensure adequate hydration  - Administer ordered medications as needed  - Encourage mobilization and activity  - Consider nutritional services referral to assist patient with adequate nutrition and appropriate food choices  Outcome: Progressing  Goal: Maintains adequate nutritional intake  Description: INTERVENTIONS:  - Monitor percentage of each meal consumed  - Identify factors contributing to decreased intake, treat as appropriate  - Assist with meals as needed  - Monitor I&O, weight, and lab values if indicated  - Obtain nutrition services referral as needed  Outcome: Progressing

## 2023-12-17 NOTE — PLAN OF CARE
Problem: Nutrition/Hydration-ADULT  Goal: Nutrient/Hydration intake appropriate for improving, restoring or maintaining nutritional needs  Description: Monitor and assess patient's nutrition/hydration status for malnutrition. Collaborate with interdisciplinary team and initiate plan and interventions as ordered.  Monitor patient's weight and dietary intake as ordered or per policy. Utilize nutrition screening tool and intervene as necessary. Determine patient's food preferences and provide high-protein, high-caloric foods as appropriate.     INTERVENTIONS:  - Monitor oral intake, urinary output, labs, and treatment plans  - Assess nutrition and hydration status and recommend course of action  - Evaluate amount of meals eaten  - Assist patient with eating if necessary   - Allow adequate time for meals  - Recommend/ encourage appropriate diets, oral nutritional supplements, and vitamin/mineral supplements  - Order, calculate, and assess calorie counts as needed  - Recommend, monitor, and adjust tube feedings and TPN/PPN based on assessed needs  - Assess need for intravenous fluids  - Provide specific nutrition/hydration education as appropriate  - Include patient/family/caregiver in decisions related to nutrition  Outcome: Progressing     Problem: PAIN - ADULT  Goal: Verbalizes/displays adequate comfort level or baseline comfort level  Description: Interventions:  - Encourage patient to monitor pain and request assistance  - Assess pain using appropriate pain scale  - Administer analgesics based on type and severity of pain and evaluate response  - Implement non-pharmacological measures as appropriate and evaluate response  - Consider cultural and social influences on pain and pain management  - Notify physician/advanced practitioner if interventions unsuccessful or patient reports new pain  Outcome: Progressing     Problem: INFECTION - ADULT  Goal: Absence or prevention of progression during  hospitalization  Description: INTERVENTIONS:  - Assess and monitor for signs and symptoms of infection  - Monitor lab/diagnostic results  - Monitor all insertion sites, i.e. indwelling lines, tubes, and drains  - Monitor endotracheal if appropriate and nasal secretions for changes in amount and color  - [place] appropriate cooling/warming therapies per order  - Administer medications as ordered  - Instruct and encourage patient and family to use good hand hygiene technique  - Identify and instruct in appropriate isolation precautions for identified infection/condition  Outcome: Progressing     Problem: SAFETY ADULT  Goal: Patient will remain free of falls  Description: INTERVENTIONS:  - Educate patient/family on patient safety including physical limitations  - Instruct patient to call for assistance with activity   - Consult OT/PT to assist with strengthening/mobility   - Keep Call bell within reach  - Keep bed low and locked with side rails adjusted as appropriate  - Keep care items and personal belongings within reach  - Initiate and maintain comfort rounds  - Make Fall Risk Sign visible to staff  - Offer Toileting every 2 Hours, in advance of need  - Obtain necessary fall risk management equipment: socks  - Apply yellow socks and bracelet for high fall risk patients  - Consider moving patient to room near nurses station  Outcome: Progressing     Problem: DISCHARGE PLANNING  Goal: Discharge to home or other facility with appropriate resources  Description: INTERVENTIONS:  - Identify barriers to discharge w/patient and caregiver  - Arrange for needed discharge resources and transportation as appropriate  - Identify discharge learning needs (meds, wound care, etc.)  - Arrange for interpretive services to assist at discharge as needed  - Refer to Case Management Department for coordinating discharge planning if the patient needs post-hospital services based on physician/advanced practitioner order or complex needs  related to functional status, cognitive ability, or social support system  Outcome: Progressing     Problem: GASTROINTESTINAL - ADULT  Goal: Minimal or absence of nausea and/or vomiting  Description: INTERVENTIONS:  - Administer IV fluids if ordered to ensure adequate hydration  - Maintain NPO status until nausea and vomiting are resolved  - Nasogastric tube if ordered  - Administer ordered antiemetic medications as needed  - Provide nonpharmacologic comfort measures as appropriate  - Advance diet as tolerated, if ordered  - Consider nutrition services referral to assist patient with adequate nutrition and appropriate food choices  Outcome: Progressing  Goal: Maintains or returns to baseline bowel function  Description: INTERVENTIONS:  - Assess bowel function  - Encourage oral fluids to ensure adequate hydration  - Administer IV fluids if ordered to ensure adequate hydration  - Administer ordered medications as needed  - Encourage mobilization and activity  - Consider nutritional services referral to assist patient with adequate nutrition and appropriate food choices  Outcome: Progressing  Goal: Maintains adequate nutritional intake  Description: INTERVENTIONS:  - Monitor percentage of each meal consumed  - Identify factors contributing to decreased intake, treat as appropriate  - Assist with meals as needed  - Monitor I&O, weight, and lab values if indicated  - Obtain nutrition services referral as needed  Outcome: Progressing

## 2023-12-17 NOTE — PROGRESS NOTES
"[facility]  Progress Note  Name: Nico Cruz I  MRN: [id number]  Unit/Bed#: [address] [place] [address] I Date of Admission: 12/15/2023   Date of Service: 12/17/2023 I Hospital Day: 1    Assessment/Plan   * Partial small bowel obstruction (HCC)  Assessment & Plan  Abdominal pain over last several days that worsened evening of admission  Afebrile, WBC 15K  CT A/P per Vrads: \" Status post internal ileostomy takedown.  Findings suspicious for partial small bowel obstruction with narrowed and thickened transitional segment with adjacent free fluid deep to previous ileostomy site.  Subcutaneous stranding deep to previous ileostomy which could reflect postoperative change, although cannot exclude superimposed cellulitis.  Further there is an irregular underlying subcutaneous fluid collection for which the differential diagnosis would include postsurgical hematoma, seroma, or abscess\"  History of IBD status post colectomy with J-pouch formation with diverting ileostomy placement in October 2023 complicated by prolapse and localized intra-abdominal infection and subsequently underwent ileostomy takedown at [facility] 8 days ago  Admitted to surgery.   Diet advanced to regular diet by surgery  Was on levaquin and flagyl; discontinued by surgery  Patient is being discharged to home today by surgery with outpatient follow up colorectal surgeon of patient's choosing  Patient reports feeling back to his baseline and is looking forward to going home today     History of ulcerative colitis  Assessment & Plan  H/o IBD s/p colectomy with J pouch formation with 10/18 diverting ileostomy complicated by prolapse and localized intra-abdominal infection   Subsequently underwent Ileostomy takedown at [facility] 9 days ago   Patient is being discharged to home today.  Will follow-up with his outpatient PCP 1 to 2 weeks postdischarge.  He will also follow-up with a colorectal surgeon of his choosing postdischarge.    Ankylosing spondylitis " ([facility])  Assessment & Plan  Noted               VTE Pharmacologic Prophylaxis: VTE Score: 1 Low Risk (Score 0-2) - Encourage Ambulation.    Mobility:   Basic Mobility Inpatient Raw Score: 24  JH-HLM Goal: 8: Walk 250 feet or more  JH-HLM Achieved: 8: Walk 250 feet ot more  HLM Goal achieved. Continue to encourage appropriate mobility.    Patient Centered Rounds: I performed bedside rounds with nursing staff today.   Discussions with Specialists or Other Care Team Provider: Surgery note appreciated    Education and Discussions with Family / Patient:  Patient indicated he would call his family.     Total Time Spent on Date of Encounter in care of patient: Greater than 45 mins. This time was spent on one or more of the following: performing physical exam; counseling and coordination of care; obtaining or reviewing history; documenting in the medical record; reviewing/ordering tests, medications or procedures; communicating with other healthcare professionals and discussing with patient's family/caregivers.    Current Length of Stay: 1 day(s)  Current Patient Status: Inpatient   Certification Statement:  Internal medicine is on consult, patient is medically stable for discharge  Discharge Plan: [facility] is following this patient on consult. They are medically stable for discharge when deemed appropriate by primary service.    Code Status: Level 1 - Full Code    Subjective:   Patient seen sitting up in bed watching TV, talking on phone.  Reports that he feels much better at his baseline.  Tolerating diet without any difficulty.  Is hopeful that he will be able to go home today.  Continues to have loose green bowel movements.    Objective:     Vitals:   Temp (24hrs), Av.9 °F (36.6 °C), Min:97.9 °F (36.6 °C), Max:98 °F (36.7 °C)    Temp:  [97.9 °F (36.6 °C)-98 °F (36.7 °C)] 97.9 °F (36.6 °C)  HR:  [] 70  Resp:  [12-18] 14  BP: (117-126)/(76-83) 117/77  SpO2:  [95 %-98 %] 98 %  Body mass index is 23.63 kg/m².     Input  and Output Summary (last 24 hours):     Intake/Output Summary (Last 24 hours) at 12/17/2023 1047  Last data filed at 12/17/2023 0116  Gross per 24 hour   Intake --   Output 300 ml   Net -300 ml       Physical Exam:   Physical Exam  Vitals and nursing note reviewed.   Constitutional:       Appearance: Normal appearance.   HENT:      Head: Normocephalic.      Nose: Nose normal.      Mouth/Throat:      Mouth: Mucous membranes are moist.   Eyes:      Extraocular Movements: Extraocular movements intact.      Conjunctiva/sclera: Conjunctivae normal.   Cardiovascular:      Rate and Rhythm: Normal rate and regular rhythm.      Pulses: Normal pulses.      Heart sounds: Normal heart sounds.   Pulmonary:      Effort: Pulmonary effort is normal.      Breath sounds: Normal breath sounds.   Abdominal:      General: Bowel sounds are normal.      Palpations: Abdomen is soft.      Comments: Surgical dressing present mid abdomen clean dry and intact   Genitourinary:     Comments: Voiding spontaneously  Musculoskeletal:         General: Normal range of motion.      Cervical back: Normal range of motion.   Skin:     General: Skin is warm and dry.      Comments: Mid abdominal surgical dressing clean dry and intact   Neurological:      General: No focal deficit present.      Mental Status: He is alert and oriented to person, place, and time.   Psychiatric:         Mood and Affect: Mood normal.         Behavior: Behavior normal.         Thought Content: Thought content normal.         Judgment: Judgment normal.          Additional Data:     Labs:  Results from last 7 days   Lab Units 12/17/23  0554   WBC Thousand/uL 10.03   HEMOGLOBIN g/dL 9.7*   HEMATOCRIT % 33.1*   PLATELETS Thousands/uL 448*   NEUTROS PCT % 71   LYMPHS PCT % 15   MONOS PCT % 10   EOS PCT % 2     Results from last 7 days   Lab Units 12/17/23  0554 12/16/23  0450   SODIUM mmol/L 137 135   POTASSIUM mmol/L 4.0 3.8   CHLORIDE mmol/L 106 102   CO2 mmol/L 27 25   BUN mg/dL 7  11   CREATININE mg/dL 0.72 0.83   ANION GAP mmol/L 4 8   CALCIUM mg/dL 8.8 9.4   ALBUMIN g/dL  --  4.4   TOTAL BILIRUBIN mg/dL  --  0.74   ALK PHOS U/L  --  75   ALT U/L  --  13   AST U/L  --  10*   GLUCOSE RANDOM mg/dL 89 93     Results from last 7 days   Lab Units 12/16/23  0450   INR  1.02     Results from last 7 days   Lab Units 12/17/23  0656 12/16/23  2209 12/16/23  1621 12/16/23  1115 12/16/23  0725   POC GLUCOSE mg/dl 92 120 94 112 84               Lines/Drains:  Invasive Devices       None                         Imaging: Reviewed radiology reports from this admission including: abdominal/pelvic CT and abdominal x-ray    Recent Cultures (last 7 days):         Last 24 Hours Medication List:   Current Facility-Administered Medications   Medication Dose Route Frequency Provider Last Rate    enoxaparin  40 mg Subcutaneous Q24H KAITLIN Tristin Rivas PA-C      ondansetron  4 mg Intravenous Q6H PRN Greg Dang DO      oxyCODONE  5 mg Oral Q4H PRN Tristin Rivas PA-C      prochlorperazine  5 mg Oral Q6H PRN Tristin Rivas PA-C          Today, Patient Was Seen By: KY Freeman    **Please Note: This note may have been constructed using a voice recognition system.**

## 2023-12-17 NOTE — ASSESSMENT & PLAN NOTE
"Abdominal pain over last several days that worsened evening of admission  Afebrile, WBC 15K  CT A/P per Vrads: \" Status post internal ileostomy takedown.  Findings suspicious for partial small bowel obstruction with narrowed and thickened transitional segment with adjacent free fluid deep to previous ileostomy site.  Subcutaneous stranding deep to previous ileostomy which could reflect postoperative change, although cannot exclude superimposed cellulitis.  Further there is an irregular underlying subcutaneous fluid collection for which the differential diagnosis would include postsurgical hematoma, seroma, or abscess\"  History of IBD status post colectomy with J-pouch formation with diverting ileostomy placement in October 2023 complicated by prolapse and localized intra-abdominal infection and subsequently underwent ileostomy takedown at [facility] 8 days ago  Admitted to surgery.   Diet advanced to regular diet by surgery  Was on levaquin and flagyl; discontinued by surgery  Patient is being discharged to home today by surgery with outpatient follow up colorectal surgeon of patient's choosing  Patient reports feeling back to his baseline and is looking forward to going home today   "

## 2023-12-18 ENCOUNTER — TRANSITIONAL CARE MANAGEMENT (OUTPATIENT)
Dept: FAMILY MEDICINE CLINIC | Facility: CLINIC | Age: 28
End: 2023-12-18

## 2023-12-18 NOTE — UTILIZATION REVIEW
Initial Clinical Review    Admission: Date/Time/Statement:   Admission Orders (From admission, onward)       Ordered        12/16/23 0218  INPATIENT ADMISSION  Once                          Orders Placed This Encounter   Procedures    INPATIENT ADMISSION     Standing Status:   Standing     Number of Occurrences:   1     Order Specific Question:   Level of Care     Answer:   Med Surg [16]     Order Specific Question:   Estimated length of stay     Answer:   More than 2 Midnights     Order Specific Question:   Certification     Answer:   I certify that inpatient services are medically necessary for this patient for a duration of greater than two midnights. See H&P and MD Progress Notes for additional information about the patient's course of treatment.     ED Arrival Information       Expected   -    Arrival   12/15/2023 18:28    Acuity   Urgent              Means of arrival   Walk-In    Escorted by   Self    Service   Trauma    Admission type   Emergency              Arrival complaint   abdominal pain             Chief Complaint   Patient presents with    Abdominal Pain     Pt c/o 9/10 burning abdominal pain following surgery about 1 week ago       Initial Presentation:   28 yom to ER from home c/o R sided burning abd pain, nausea. Reports hx UC with reversal of enterostomy 8 days ago. Other hx anxiety, ankylosing spondylitis, thalassemia minor, ulcerative colitis status post J-pouch creation in 2015 with recurrent small bowel obstruction/strictures c/b proximal limb evisceration requiring revision and sepsis requiring washout and J-pouch redo in 4/2023.  Patient had pneumoperitoneum after pouchoscopy with dilatation and stenting of colorectal stricture back in October. Presents tachycardic, generalized abd tenderness. Admission CT showing  internal ileostomy takedown.  Findings suspicious for partial small bowel obstruction with narrowed and thickened transitional segment with adjacent free fluid deep to previous  ileostomy site.  Subcutaneous stranding deep to previous ileostomy which could reflect postoperative change, although cannot exclude superimposed cellulitis.  Further there is an irregular underlying subcutaneous fluid collection for which the differential diagnosis would include postsurgical hematoma, seroma, or abscess.   Admitted to inpatient status for partial SBO.     Date: 12/16/23   Day 2:   Continues to require IV Dilaudid & IV Zofran for pain & nausea control respectively. IVABT in progress. IV Mg repletion given. Abd mid abdominal wound with dressing noted, some mild erythema noted.  Reports liquid green stool. Trial advancing diet to clear liquids, IVF maintained at this time.     ED Triage Vitals [12/15/23 1842]   Temperature Pulse Respirations Blood Pressure SpO2   98.7 °F (37.1 °C) (!) 121 20 148/92 97 %      Temp Source Heart Rate Source Patient Position - Orthostatic VS BP Location FiO2 (%)   Oral Monitor Sitting Left arm --      Pain Score       9          Wt Readings from Last 1 Encounters:   12/15/23 72.6 kg (160 lb)     Additional Vital Signs:   Date/Time Temp Pulse Resp BP MAP (mmHg) SpO2 O2 Device Patient Position - Orthostatic VS   12/17/23 0824 -- -- -- -- -- -- None (Room air) --   12/17/23 07:45:32 97.9 °F (36.6 °C) 70 14 117/77 90 98 % -- Lying   12/16/23 2300 -- 77 -- -- -- 95 % -- --   12/16/23 22:11:35 97.9 °F (36.6 °C) 96 16 117/76 90 96 % -- --   12/16/23 2100 -- 103 -- -- -- 96 % -- --   12/16/23 19:41:31 98 °F (36.7 °C) 93 18 122/80 94 98 % -- Lying   12/16/23 1941 -- -- -- -- -- -- None (Room air) --   12/16/23 15:57:36 97.9 °F (36.6 °C) 84 12 126/83 97 98 % -- --   12/16/23 15:56:39 97.9 °F (36.6 °C) 66 13 126/83 97 96 % -- --   12/16/23 08:16:13 97.9 °F (36.6 °C) 101 16 130/80 97 97 % -- --   12/16/23 0342 -- -- -- -- -- -- None (Room air) --   12/16/23 03:25:40 97.9 °F (36.6 °C) 86 18 127/93 104 97 % None (Room air) Lying   12/15/23 2349 97.9 °F (36.6 °C) 108 Abnormal  20 152/92  -- 98 % None (Room air) Sitting   12/15/23 1844 -- -- -- -- -- -- None (Room air) --   12/15/23 1842 98.7 °F (37.1 °C) 121 Abnormal  20 148/92 -- 97 % None (Room air) Sitting     Pertinent Labs/Diagnostic Test Results:   XR abdomen obstruction series   Final Result (12/16 2208)      Distended loops of small bowel are similar to prior  radiograph from the day before.         CT abdomen pelvis with contrast   Final Result  (12/17 0723)      1. History as above with interval ileostomy takedown. Short segment of slightly thick walled small bowel underlying the takedown site with mild stranding in the fat adjacent to this loop. Although the appearance could represent focal contraction and    postoperative changes, mild ileitis is not excluded. Mildly distended small bowel loops intermixed with nondistended small bowel. Small bowel pattern could reflect mild postoperative ileus, the possibility of a partial small bowel obstruction cannot be    completely excluded. Short interval follow-up plain films and/or CT may be helpful to ensure progression of the oral contrast to the distal anastomosis.      2. Postoperative changes right anterior abdominal wall related to above history. 3.8 x 0.8 x 2.3 cm triangular shaped fluid collection right lower quadrant anterior peritoneal surface could represent small seroma/hematoma with possibility of abscess not    entirely excluded.      3. Probable mild reactive pelvic lymphadenopathy.        Results from last 7 days   Lab Units 12/17/23  0554 12/16/23  0450 12/15/23  1847   WBC Thousand/uL 10.03 16.12* 15.31*   HEMOGLOBIN g/dL 9.7* 10.9* 12.7   HEMATOCRIT % 33.1* 36.3* 43.0   PLATELETS Thousands/uL 448* 503* 514*   NEUTROS ABS Thousands/µL 7.26 12.66* 10.92*     Results from last 7 days   Lab Units 12/17/23  0554 12/16/23  0450 12/15/23  1847   SODIUM mmol/L 137 135 136   POTASSIUM mmol/L 4.0 3.8 3.9   CHLORIDE mmol/L 106 102 102   CO2 mmol/L 27 25 25   ANION GAP mmol/L 4 8 9    BUN mg/dL 7 11 13   CREATININE mg/dL 0.72 0.83 0.97   EGFR ml/min/1.73sq m 125 118 104   CALCIUM mg/dL 8.8 9.4 9.7   MAGNESIUM mg/dL 2.0 1.7*  --      Results from last 7 days   Lab Units 12/16/23  0450 12/15/23  1847   AST U/L 10* 10*   ALT U/L 13 15   ALK PHOS U/L 75 79   TOTAL PROTEIN g/dL 6.7 8.4   ALBUMIN g/dL 4.4 4.9   TOTAL BILIRUBIN mg/dL 0.74 0.62     Results from last 7 days   Lab Units 12/17/23  0656 12/16/23  2209 12/16/23  1621 12/16/23  1115 12/16/23  0725   POC GLUCOSE mg/dl 92 120 94 112 84     Results from last 7 days   Lab Units 12/17/23  0554 12/16/23  0450 12/15/23  1847   GLUCOSE RANDOM mg/dL 89 93 86     Results from last 7 days   Lab Units 12/16/23  0450   PROTIME seconds 13.6   INR  1.02   PTT seconds 29     Results from last 7 days   Lab Units 12/15/23  1847   LIPASE u/L 41       ED Treatment:   Medication Administration from 12/15/2023 1828 to 12/16/2023 0320         Date/Time Order Dose Route Action     12/15/2023 1934 EST sodium chloride 0.9 % infusion 150 mL/hr Intravenous New Bag     12/15/2023 1935 EST ondansetron (ZOFRAN) injection 4 mg 4 mg Intravenous Given     12/15/2023 1935 EST HYDROmorphone (DILAUDID) injection 2 mg 2 mg Intravenous Given     12/15/2023 1944 EST iohexol (OMNIPAQUE) 240 MG/ML solution 50 mL 50 mL Oral Given     12/15/2023 2151 EST HYDROmorphone (DILAUDID) injection 1 mg 1 mg Intravenous Given     12/15/2023 2217 EST iohexol (OMNIPAQUE) 350 MG/ML injection (MULTI-DOSE) 100 mL 100 mL Intravenous Given     12/16/2023 0053 EST HYDROmorphone (DILAUDID) injection 1 mg 1 mg Intravenous Given          Past Medical History:   Diagnosis Date    Ankylosing spondylitis (HCC)     Anxiety     Bowel obstruction (HCC)     Clostridium difficile colitis 9/13/2018    Colitis     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HCC) 9/13/2018    Pancreatitis     Ulcerative colitis (HCC)      Present on Admission:   Partial small bowel obstruction (HCC)   Ankylosing spondylitis (HCC)      Admitting Diagnosis:  Abdominal pain [R10.9]  Abnormal CT scan [R93.89]  H/O major abdominal surgery [Z98.890]  Age/Sex: 28 y.o. male  Admission Orders:  Consult surgery  Pt/ot eval & tx  Accuchecks    Scheduled Medications:  Medications 12/08 12/09 12/10 12/11 12/12 12/13 12/14 12/15 12/16 12/17   enoxaparin (LOVENOX) subcutaneous injection 40 mg  Dose: 40 mg  Freq: Every 24 hours scheduled Route: SC  Start: 12/16/23 1015 End: 12/17/23 1252   Admin Instructions:               1054      0817     1252-D/C'd      HYDROmorphone (DILAUDID) injection 1 mg  Dose: 1 mg  Freq: Once Route: IV  Start: 12/16/23 0415 End: 12/16/23 0419   Admin Instructions:               0419         HYDROmorphone (DILAUDID) injection 1 mg  Dose: 1 mg  Freq: Once Route: IV  Start: 12/16/23 0030 End: 12/16/23 0053   Admin Instructions:               0053         HYDROmorphone (DILAUDID) injection 1 mg  Dose: 1 mg  Freq: Once Route: IV  Start: 12/15/23 2200 End: 12/15/23 2151   Admin Instructions:              2151          HYDROmorphone (DILAUDID) injection 2 mg  Dose: 2 mg  Freq: Once Route: IV  Start: 12/15/23 1930 End: 12/15/23 1935   Admin Instructions:              1935          HYDROmorphone HCl (DILAUDID) injection 0.2 mg  Dose: 0.2 mg  Freq: Once Route: IV  Start: 12/17/23 0830 End: 12/17/23 0824   Admin Instructions:                0824        iohexol (OMNIPAQUE) 240 MG/ML solution 50 mL  Dose: 50 mL  Freq: 90 min pre-procedure Route: PO  Start: 12/15/23 1920 End: 12/15/23 1944   Admin Instructions:              1944          levofloxacin (LEVAQUIN) IVPB (premix in dextrose) 750 mg 150 mL  Dose: 750 mg  Freq: Once Route: IV  Last Dose: Stopped (12/16/23 0530)  Start: 12/16/23 0315 End: 12/16/23 0530   Admin Instructions:      Order specific questions:               0348     0530         levofloxacin (LEVAQUIN) IVPB (premix in dextrose) 750 mg 150 mL  Dose: 750 mg  Freq: Every 24 hours Route: IV  Start: 12/17/23 0500 End: 12/16/23 1018   Admin Instructions:       Order specific questions:               1018-D/C'd       levofloxacin (LEVAQUIN) tablet 750 mg  Dose: 750 mg  Freq: Every 24 hours Route: PO  Start: 12/16/23 0230 End: 12/16/23 0308   Admin Instructions:      Order specific questions:               0308-D/C'd  (0349)         LORazepam (ATIVAN) injection 0.25 mg  Dose: 0.25 mg  Freq: Once Route: IV  Start: 12/16/23 0415 End: 12/16/23 0419   Admin Instructions:               0419         magnesium sulfate IVPB (premix) SOLN 1 g  Dose: 1 g  Freq: Once Route: IV  Start: 12/16/23 1015 End: 12/16/23 1153   Admin Instructions:               1053         metroNIDAZOLE (FLAGYL) IVPB (premix) 500 mg 100 mL  Dose: 500 mg  Freq: Once Route: IV  Last Dose: Stopped (12/16/23 0715)  Start: 12/16/23 0245 End: 12/16/23 0715   Admin Instructions:      Order specific questions:               0531     0715         metroNIDAZOLE (FLAGYL) IVPB (premix) 500 mg 100 mL  Dose: 500 mg  Freq: Every 8 hours Route: IV  Start: 12/16/23 1200 End: 12/16/23 1018   Admin Instructions:      Order specific questions:               1018-D/C'd       ondansetron (ZOFRAN) injection 4 mg  Dose: 4 mg  Freq: Once Route: IV  Start: 12/15/23 1930 End: 12/15/23 1935   Admin Instructions:              1935          Legend:       [id number]/0912/1012/1112/1212/1312/1412/1512/1612/17        Continuous Meds Sorted by Name  for Nico Cruz as of 12/18/23 0824  Legend:       Medications 12/08 12/09 12/10 12/11 12/12 12/13 12/14 12/15 12/16 12/17   dextrose 5 % and sodium chloride 0.9 % infusion  Rate: 100 mL/hr Dose: 100 mL/hr  Freq: Continuous Route: IV  Last Dose: Stopped (12/17/23 0816)  Start: 12/16/23 0300 End: 12/17/23 0806            0334     0531 [C]     0728     1050     1751      0120     0806-D/C'd  0816        dextrose 5 % in lactated Ringer's infusion  Rate: 125 mL/hr Dose: 125 mL/hr  Freq: Continuous Route: IV  Start: 12/16/23 0230 End: 12/16/23 0259            0259-D/C'd  (0339)          sodium chloride 0.9 % infusion  Rate: 150 mL/hr Dose: 150 mL/hr  Freq: Continuous Route: IV  Indications of Use: IV Hydration  Last Dose: 150 mL/hr (12/15/23 1934)  Start: 12/15/23 1930 End: 12/16/23 0226 1934 0226-D/C'd       Legend:       [id number]/0812/0912/1012/1112/1212/1312/1412/1512/1612/17        PRN Meds Sorted by Name  for Nico Cruz as of 12/18/23 0824  Legend:       Medications 12/08 12/09 12/10 12/11 12/12 12/13 12/14 12/15 12/16 12/17   HYDROmorphone (DILAUDID) injection 0.2 mg  Dose: 0.2 mg  Freq: Every 6 hours PRN Route: IV  PRN Reason: breakthrough pain  Start: 12/16/23 0227 End: 12/16/23 1132   Admin Instructions:               1132-D/C'd       HYDROmorphone (DILAUDID) injection 0.5 mg  Dose: 0.5 mg  Freq: Every 6 hours PRN Route: IV  PRN Reasons: moderate pain,severe pain  Start: 12/16/23 0226 End: 12/17/23 0818   Admin Instructions:               0814     1444     1949      0120     0818-D/C'd      HYDROmorphone HCl (DILAUDID) injection 0.2 mg  Dose: 0.2 mg  Freq: Every 6 hours PRN Route: IV  PRN Reason: breakthrough pain  Start: 12/16/23 1131 End: 12/17/23 0818   Admin Instructions:               1145      0553     0818-D/C'd      iohexol (OMNIPAQUE) 350 MG/ML injection (MULTI-DOSE) 100 mL  Dose: 100 mL  Freq: Once in imaging Route: IV  PRN Reason: contrast  Start: 12/15/23 2217 End: 12/15/23 2217           2217          ondansetron (ZOFRAN) injection 4 mg  Dose: 4 mg  Freq: Every 6 hours PRN Route: IV  PRN Reasons: nausea,vomiting  Start: 12/16/23 0226 End: 12/17/23 1252   Admin Instructions:               0912     1540      1252-D/C'd      oxyCODONE (ROXICODONE) IR tablet 5 mg  Dose: 5 mg  Freq: Every 4 hours PRN Route: PO  PRN Reasons: severe pain,moderate pain  Start: 12/17/23 0818 End: 12/17/23 1252   Admin Instructions:                1252-D/C'd      prochlorperazine (COMPAZINE) tablet 5 mg  Dose: 5 mg  Freq: Every 6 hours PRN Route: PO  PRN Reasons:  nausea,vomiting  PRN Comment: 1st line therapy  Start: 12/17/23 0805 End: 12/17/23 1252             1252-D/C'd          Network Utilization Review Department  ATTENTION: Please call with any questions or concerns to [phone] and carefully listen to the prompts so that you are directed to the right person. All voicemails are confidential.   For Discharge needs, contact Care Management DC Support Team at [phone] opt. 2  Send all requests for admission clinical reviews, approved or denied determinations and any other requests to dedicated fax number below belonging to the campus where the patient is receiving treatment. List of dedicated fax numbers for the Facilities:  FACILITY NAME UR FAX NUMBER   ADMISSION DENIALS (Administrative/Medical Necessity) [phone]   DISCHARGE SUPPORT TEAM (NETWORK) [phone]   PARENT CHILD HEALTH (Maternity/NICU/Pediatrics) [phone]   [facility] [phone]   [facility] [phone]   [facility] [phone]   [facility] [phone]   [facility] [phone]   [facility] [phone]   [facility] [phone]   [facility] [phone]   [facility] [phone]   [facility] [phone]   [facility] [phone]

## 2023-12-19 ENCOUNTER — OFFICE VISIT (OUTPATIENT)
Dept: FAMILY MEDICINE CLINIC | Facility: CLINIC | Age: 28
End: 2023-12-19
Payer: COMMERCIAL

## 2023-12-19 VITALS
WEIGHT: 176 LBS | BODY MASS INDEX: 26.07 KG/M2 | TEMPERATURE: 98.5 F | RESPIRATION RATE: 16 BRPM | DIASTOLIC BLOOD PRESSURE: 70 MMHG | HEART RATE: 76 BPM | HEIGHT: 69 IN | OXYGEN SATURATION: 99 % | SYSTOLIC BLOOD PRESSURE: 130 MMHG

## 2023-12-19 DIAGNOSIS — E55.9 VITAMIN D DEFICIENCY: ICD-10-CM

## 2023-12-19 DIAGNOSIS — Z87.19 HISTORY OF ULCERATIVE COLITIS: ICD-10-CM

## 2023-12-19 DIAGNOSIS — D64.9 ANEMIA, UNSPECIFIED TYPE: ICD-10-CM

## 2023-12-19 DIAGNOSIS — R10.31 ABDOMINAL PAIN, RLQ: Primary | ICD-10-CM

## 2023-12-19 DIAGNOSIS — K40.90 LEFT INGUINAL HERNIA: ICD-10-CM

## 2023-12-19 DIAGNOSIS — R19.7 DIARRHEA, UNSPECIFIED TYPE: ICD-10-CM

## 2023-12-19 DIAGNOSIS — E83.42 HYPOMAGNESEMIA: ICD-10-CM

## 2023-12-19 PROCEDURE — 99496 TRANSJ CARE MGMT HIGH F2F 7D: CPT | Performed by: FAMILY MEDICINE

## 2023-12-19 NOTE — PROGRESS NOTES
Assessment/Plan:    No problem-specific Assessment & Plan notes found for this encounter.    Recheck obs series for rlq area    Local colorectal referral   Consider Dr Perez for IBD gastroenterologist    Recheck mg,D, iron    Consider iron infusion therapy if non absorber or intolerant of po iron sabrina if gets constipated     Diagnoses and all orders for this visit:    Abdominal pain, RLQ  -     XR abdomen obstruction series; Future    Diarrhea, unspecified type    Vitamin D deficiency  -     Vitamin D 25 hydroxy; Future    Hypomagnesemia  -     Magnesium; Future    Anemia, unspecified type  -     CBC and differential; Future  -     TIBC Panel (incl. Iron, TIBC, % Iron Saturation); Future    History of ulcerative colitis  -     Ambulatory referral to Colorectal Surgery; Future    Left inguinal hernia        No follow-ups on file.    Subjective:      Patient ID: Nico Cruz is a 28 y.o. male.    Chief Complaint   Patient presents with    TCM     Marimar/VIKTOR       HPI  Hosp records ok  Eating/drinking ok  Pain ok  11d postop from [facility]  No fevers  Otc iron 18mg  2 per day  Has anemia as noted in hgb trend  Hx of thalassemia  Taking D 4K/d  Vit C, tumeric, probiotic, Mg 125mg/d  Still with RLQ tenderness, no blood in stool  Planning local specialists, gi and colorectal surgery  Off anxiety meds and ssri, mood is stable    TCM Call       Date and time call was made  12/18/2023  9:30 AM    Hospital care reviewed  Records reviewed    Patient was hospitialized at  [facility]    Date of Admission  12/15/23    Date of discharge  12/17/23    Diagnosis  Partial small bowel obstruction    Disposition  Home    Were the patients medications reviewed and updated  Yes    Current Symptoms  None    Cough Severity  Mild    Lower abdominal pain severity  Mild  Started last pm. He called his surgeon at [facility] to discuss          TCM Call       Post hospital issues  None    Should patient be enrolled in anticoag monitoring?  No     "Scheduled for follow up?  Yes    Not clinically warranted  He was re admitted to the hospital in [facility]- currently admitted     Patients specialists  Other (comment)    Pulmonologist name  Dr Weems    Other specialists names  Navneet Quevedo MD (Colon and Rectal Surgery)    Other specialists contcat #  [facility]    Did you obtain your prescribed medications  Yes    Do you need help managing your prescriptions or medications  No    Is transportation to your appointment needed  No    I have advised the patient to call PCP with any new or worsening symptoms  Barrett Dumont    Living Arrangements  Family members    Support System  Family    The type of support provided  Physical; Emotional    Do you have social support  Yes, as much as I need    Are you recieving any outpatient services  No    Are you recieving home care services  No    Types of home care services  Nurse visit    Have you fallen in the last 12 months  No    Interperter language line needed  No    Counseling  Patient    Counseling topics  Activities of daily living; Importance of RX compliance; patient and family education; instructions for management; Risk factor reduction    Comments  I spoke with Nico who states that he is currently doing ok. NO c/o at this time. He knows to report any fevers, increase in pain, etc Barrett Dumont          The following portions of the patient's history were reviewed and updated as appropriate: allergies, current medications, past family history, past medical history, past social history, past surgical history and problem list.    Review of Systems   Constitutional:  Negative for chills and fever.   Gastrointestinal:  Positive for abdominal pain.         No current outpatient medications on file.     No current facility-administered medications for this visit.       Objective:    /70   Pulse 76   Temp 98.5 °F (36.9 °C) (Temporal)   Resp 16   Ht 5' 9\" (1.753 m)   Wt 79.8 kg (176 lb)   SpO2 99%   " BMI 25.99 kg/m²        Physical Exam  Vitals and nursing note reviewed.   Constitutional:       General: He is not in acute distress.     Appearance: He is well-developed. He is not ill-appearing.   HENT:      Head: Normocephalic.      Right Ear: Tympanic membrane normal.      Left Ear: Tympanic membrane normal.   Eyes:      General: No scleral icterus.     Conjunctiva/sclera: Conjunctivae normal.   Cardiovascular:      Rate and Rhythm: Normal rate and regular rhythm.   Pulmonary:      Effort: Pulmonary effort is normal. No respiratory distress.   Abdominal:      General: Bowel sounds are normal. There is no distension.      Palpations: Abdomen is soft.      Tenderness: There is abdominal tenderness. There is no rebound.      Comments: rlq   Musculoskeletal:         General: No deformity.      Cervical back: Neck supple.      Right lower leg: No edema.      Left lower leg: No edema.   Skin:     General: Skin is warm and dry.      Coloration: Skin is not pale.   Neurological:      Mental Status: He is alert.      Motor: No weakness.      Gait: Gait normal.   Psychiatric:         Mood and Affect: Mood normal.         Behavior: Behavior normal.         Thought Content: Thought content normal.                Smooth Dietz DO

## 2023-12-20 PROBLEM — F41.1 GAD (GENERALIZED ANXIETY DISORDER): Status: RESOLVED | Noted: 2018-10-19 | Resolved: 2023-12-20

## 2023-12-20 PROBLEM — R25.8: Status: RESOLVED | Noted: 2021-02-17 | Resolved: 2023-12-20

## 2023-12-20 PROBLEM — R19.7 DIARRHEA: Status: ACTIVE | Noted: 2023-12-20

## 2023-12-20 PROBLEM — U07.1 COVID-19: Status: RESOLVED | Noted: 2022-05-30 | Resolved: 2023-12-20

## 2023-12-20 PROBLEM — L30.9 PERISTOMAL DERMATITIS: Status: RESOLVED | Noted: 2022-10-19 | Resolved: 2023-12-20

## 2023-12-20 PROBLEM — K52.9 ENTERITIS: Status: RESOLVED | Noted: 2022-02-24 | Resolved: 2023-12-20

## 2023-12-20 PROBLEM — M54.50 LEFT-SIDED LOW BACK PAIN WITHOUT SCIATICA: Status: RESOLVED | Noted: 2019-11-29 | Resolved: 2023-12-20

## 2023-12-20 PROBLEM — R10.32 LEFT GROIN PAIN: Status: RESOLVED | Noted: 2019-11-29 | Resolved: 2023-12-20

## 2023-12-20 PROBLEM — R65.10 SIRS (SYSTEMIC INFLAMMATORY RESPONSE SYNDROME) (HCC): Status: RESOLVED | Noted: 2023-02-16 | Resolved: 2023-12-20

## 2023-12-20 PROBLEM — K40.90 LEFT INGUINAL HERNIA: Status: ACTIVE | Noted: 2023-12-20

## 2023-12-20 PROBLEM — R10.13 EPIGASTRIC ABDOMINAL PAIN: Status: RESOLVED | Noted: 2022-03-22 | Resolved: 2023-12-20

## 2023-12-20 PROBLEM — G89.4 CHRONIC PAIN SYNDROME: Status: RESOLVED | Noted: 2022-11-22 | Resolved: 2023-12-20

## 2023-12-20 PROBLEM — K62.4 STRICTURE OF RECTUM: Status: RESOLVED | Noted: 2018-09-13 | Resolved: 2023-12-20

## 2023-12-20 PROBLEM — E55.9 VITAMIN D DEFICIENCY: Status: ACTIVE | Noted: 2023-12-20

## 2023-12-20 PROBLEM — M53.3 SACROILIAC JOINT DYSFUNCTION OF RIGHT SIDE: Status: RESOLVED | Noted: 2020-07-27 | Resolved: 2023-12-20

## 2023-12-20 PROBLEM — E83.42 HYPOMAGNESEMIA: Status: ACTIVE | Noted: 2023-12-20

## 2023-12-20 PROBLEM — R78.81 POSITIVE BLOOD CULTURES: Status: RESOLVED | Noted: 2022-02-25 | Resolved: 2023-12-20

## 2023-12-20 PROBLEM — Z93.2 ILEOSTOMY PRESENT (HCC): Status: RESOLVED | Noted: 2022-10-15 | Resolved: 2023-12-20

## 2023-12-20 PROBLEM — K66.8 PNEUMOPERITONEUM: Status: RESOLVED | Noted: 2023-10-06 | Resolved: 2023-12-20

## 2023-12-20 PROBLEM — K62.4 RECTAL OBSTRUCTION: Status: RESOLVED | Noted: 2022-05-30 | Resolved: 2023-12-20

## 2023-12-20 NOTE — UTILIZATION REVIEW
NOTIFICATION OF ADMISSION DISCHARGE   This is a Notification of Discharge from [facility]. Please be advised that this patient has been discharge from our facility. Below you will find the admission and discharge date and time including the patient’s disposition.   UTILIZATION REVIEW CONTACT:  Yvette Overton  Utilization   Network Utilization Review Department  Phone: [phone] x carefully listen to the prompts. All voicemails are confidential.  Email: [email or web address]     ADMISSION INFORMATION  PRESENTATION DATE: 12/15/2023  6:29 PM  OBERVATION ADMISSION DATE:   INPATIENT ADMISSION DATE: 12/16/23  2:18 AM   DISCHARGE DATE: 12/17/2023 10:52 AM   DISPOSITION:Home/Self Care    Network Utilization Review Department  ATTENTION: Please call with any questions or concerns to [phone] and carefully listen to the prompts so that you are directed to the right person. All voicemails are confidential.   For Discharge needs, contact Care Management DC Support Team at [phone] opt. 2  Send all requests for admission clinical reviews, approved or denied determinations and any other requests to dedicated fax number below belonging to the campus where the patient is receiving treatment. List of dedicated fax numbers for the Facilities:  FACILITY NAME UR FAX NUMBER   ADMISSION DENIALS (Administrative/Medical Necessity) [phone]   DISCHARGE SUPPORT TEAM ([facility]) [phone]   PARENT CHILD HEALTH (Maternity/NICU/Pediatrics) [phone]   [facility] [phone]   [facility] [phone]   [facility] [phone]   [facility] [phone]   [facility] [phone]   [facility] [phone]   [facility] [phone]   [facility] [phone]    [facility] [phone]   [facility] [phone]   [facility] [phone]

## 2023-12-26 ENCOUNTER — HOSPITAL ENCOUNTER (INPATIENT)
Facility: HOSPITAL | Age: 28
LOS: 3 days | Discharge: HOME | DRG: 854 | End: 2023-12-31
Attending: EMERGENCY MEDICINE | Admitting: FAMILY MEDICINE
Payer: COMMERCIAL

## 2023-12-26 ENCOUNTER — APPOINTMENT (EMERGENCY)
Dept: RADIOLOGY | Facility: HOSPITAL | Age: 28
DRG: 854 | End: 2023-12-26
Payer: COMMERCIAL

## 2023-12-26 DIAGNOSIS — R11.2 NAUSEA VOMITING AND DIARRHEA: ICD-10-CM

## 2023-12-26 DIAGNOSIS — R19.7 DIARRHEA: ICD-10-CM

## 2023-12-26 DIAGNOSIS — R10.84 GENERALIZED ABDOMINAL PAIN: Primary | ICD-10-CM

## 2023-12-26 DIAGNOSIS — R19.7 NAUSEA VOMITING AND DIARRHEA: ICD-10-CM

## 2023-12-26 DIAGNOSIS — R11.10 VOMITING: ICD-10-CM

## 2023-12-26 DIAGNOSIS — L02.211 ABDOMINAL WALL ABSCESS: ICD-10-CM

## 2023-12-26 DIAGNOSIS — R19.7 DIARRHEA, UNSPECIFIED TYPE: ICD-10-CM

## 2023-12-26 DIAGNOSIS — R11.0 NAUSEA: ICD-10-CM

## 2023-12-26 LAB
ALBUMIN SERPL BCP-MCNC: 3.8 G/DL (ref 3.5–5)
ALP SERPL-CCNC: 54 U/L (ref 34–104)
ALT SERPL W P-5'-P-CCNC: 10 U/L (ref 7–52)
ANION GAP SERPL CALCULATED.3IONS-SCNC: 8 MMOL/L
AST SERPL W P-5'-P-CCNC: 31 U/L (ref 13–39)
BASOPHILS # BLD AUTO: 0.11 THOUSANDS/ÂΜL (ref 0–0.1)
BASOPHILS NFR BLD AUTO: 1 % (ref 0–1)
BILIRUB SERPL-MCNC: 0.87 MG/DL (ref 0.2–1)
BUN SERPL-MCNC: 12 MG/DL (ref 5–25)
CALCIUM SERPL-MCNC: 7.3 MG/DL (ref 8.4–10.2)
CHLORIDE SERPL-SCNC: 110 MMOL/L (ref 96–108)
CO2 SERPL-SCNC: 20 MMOL/L (ref 21–32)
CREAT SERPL-MCNC: 0.78 MG/DL (ref 0.6–1.3)
EOSINOPHIL # BLD AUTO: 0.23 THOUSAND/ÂΜL (ref 0–0.61)
EOSINOPHIL NFR BLD AUTO: 1 % (ref 0–6)
ERYTHROCYTE [DISTWIDTH] IN BLOOD BY AUTOMATED COUNT: 19.9 % (ref 11.6–15.1)
GFR SERPL CREATININE-BSD FRML MDRD: 121 ML/MIN/1.73SQ M
GLUCOSE SERPL-MCNC: 80 MG/DL (ref 65–140)
HCT VFR BLD AUTO: 46.2 % (ref 36.5–49.3)
HGB BLD-MCNC: 14 G/DL (ref 12–17)
IMM GRANULOCYTES # BLD AUTO: 0.13 THOUSAND/UL (ref 0–0.2)
IMM GRANULOCYTES NFR BLD AUTO: 1 % (ref 0–2)
LACTATE SERPL-SCNC: 1.6 MMOL/L (ref 0.5–2)
LACTATE SERPL-SCNC: 2.4 MMOL/L (ref 0.5–2)
LACTATE SERPL-SCNC: 3.1 MMOL/L (ref 0.5–2)
LIPASE SERPL-CCNC: 9 U/L (ref 11–82)
LYMPHOCYTES # BLD AUTO: 0.5 THOUSANDS/ÂΜL (ref 0.6–4.47)
LYMPHOCYTES NFR BLD AUTO: 2 % (ref 14–44)
MCH RBC QN AUTO: 19.1 PG (ref 26.8–34.3)
MCHC RBC AUTO-ENTMCNC: 30.3 G/DL (ref 31.4–37.4)
MCV RBC AUTO: 63 FL (ref 82–98)
MONOCYTES # BLD AUTO: 1.45 THOUSAND/ÂΜL (ref 0.17–1.22)
MONOCYTES NFR BLD AUTO: 6 % (ref 4–12)
NEUTROPHILS # BLD AUTO: 21.25 THOUSANDS/ÂΜL (ref 1.85–7.62)
NEUTS SEG NFR BLD AUTO: 89 % (ref 43–75)
NRBC BLD AUTO-RTO: 0 /100 WBCS
PLATELET # BLD AUTO: 275 THOUSANDS/UL (ref 149–390)
PMV BLD AUTO: 9.9 FL (ref 8.9–12.7)
POTASSIUM SERPL-SCNC: 4.9 MMOL/L (ref 3.5–5.3)
PROCALCITONIN SERPL-MCNC: 0.16 NG/ML
PROT SERPL-MCNC: 6.3 G/DL (ref 6.4–8.4)
RBC # BLD AUTO: 7.32 MILLION/UL (ref 3.88–5.62)
SODIUM SERPL-SCNC: 138 MMOL/L (ref 135–147)
WBC # BLD AUTO: 23.67 THOUSAND/UL (ref 4.31–10.16)

## 2023-12-26 PROCEDURE — 99285 EMERGENCY DEPT VISIT HI MDM: CPT | Performed by: EMERGENCY MEDICINE

## 2023-12-26 PROCEDURE — 87040 BLOOD CULTURE FOR BACTERIA: CPT | Performed by: EMERGENCY MEDICINE

## 2023-12-26 PROCEDURE — 83690 ASSAY OF LIPASE: CPT | Performed by: EMERGENCY MEDICINE

## 2023-12-26 PROCEDURE — 99223 1ST HOSP IP/OBS HIGH 75: CPT | Performed by: FAMILY MEDICINE

## 2023-12-26 PROCEDURE — 80053 COMPREHEN METABOLIC PANEL: CPT | Performed by: EMERGENCY MEDICINE

## 2023-12-26 PROCEDURE — 83605 ASSAY OF LACTIC ACID: CPT | Performed by: FAMILY MEDICINE

## 2023-12-26 PROCEDURE — 96375 TX/PRO/DX INJ NEW DRUG ADDON: CPT

## 2023-12-26 PROCEDURE — 96374 THER/PROPH/DIAG INJ IV PUSH: CPT

## 2023-12-26 PROCEDURE — 84145 PROCALCITONIN (PCT): CPT | Performed by: FAMILY MEDICINE

## 2023-12-26 PROCEDURE — 99284 EMERGENCY DEPT VISIT MOD MDM: CPT

## 2023-12-26 PROCEDURE — G1004 CDSM NDSC: HCPCS

## 2023-12-26 PROCEDURE — 36415 COLL VENOUS BLD VENIPUNCTURE: CPT | Performed by: EMERGENCY MEDICINE

## 2023-12-26 PROCEDURE — 85025 COMPLETE CBC W/AUTO DIFF WBC: CPT | Performed by: EMERGENCY MEDICINE

## 2023-12-26 PROCEDURE — 83605 ASSAY OF LACTIC ACID: CPT | Performed by: EMERGENCY MEDICINE

## 2023-12-26 PROCEDURE — 96361 HYDRATE IV INFUSION ADD-ON: CPT

## 2023-12-26 PROCEDURE — 74177 CT ABD & PELVIS W/CONTRAST: CPT

## 2023-12-26 PROCEDURE — 96376 TX/PRO/DX INJ SAME DRUG ADON: CPT

## 2023-12-26 RX ORDER — HYDROMORPHONE HCL/PF 1 MG/ML
0.5 SYRINGE (ML) INJECTION ONCE
Status: COMPLETED | OUTPATIENT
Start: 2023-12-26 | End: 2023-12-26

## 2023-12-26 RX ORDER — ONDANSETRON 2 MG/ML
4 INJECTION INTRAMUSCULAR; INTRAVENOUS EVERY 6 HOURS PRN
Status: DISCONTINUED | OUTPATIENT
Start: 2023-12-26 | End: 2023-12-31 | Stop reason: HOSPADM

## 2023-12-26 RX ORDER — ONDANSETRON 2 MG/ML
4 INJECTION INTRAMUSCULAR; INTRAVENOUS ONCE
Status: COMPLETED | OUTPATIENT
Start: 2023-12-26 | End: 2023-12-26

## 2023-12-26 RX ORDER — HYDROMORPHONE HCL/PF 1 MG/ML
0.2 SYRINGE (ML) INJECTION
Status: DISCONTINUED | OUTPATIENT
Start: 2023-12-26 | End: 2023-12-31 | Stop reason: HOSPADM

## 2023-12-26 RX ORDER — ENOXAPARIN SODIUM 100 MG/ML
40 INJECTION SUBCUTANEOUS DAILY
Status: DISCONTINUED | OUTPATIENT
Start: 2023-12-27 | End: 2023-12-31 | Stop reason: HOSPADM

## 2023-12-26 RX ORDER — SODIUM CHLORIDE 9 MG/ML
125 INJECTION, SOLUTION INTRAVENOUS CONTINUOUS
Status: DISCONTINUED | OUTPATIENT
Start: 2023-12-26 | End: 2023-12-31 | Stop reason: HOSPADM

## 2023-12-26 RX ORDER — PROMETHAZINE HYDROCHLORIDE 25 MG/ML
12.5 INJECTION, SOLUTION INTRAMUSCULAR; INTRAVENOUS ONCE
Status: COMPLETED | OUTPATIENT
Start: 2023-12-26 | End: 2023-12-26

## 2023-12-26 RX ORDER — LEVOFLOXACIN 5 MG/ML
500 INJECTION, SOLUTION INTRAVENOUS ONCE
Status: COMPLETED | OUTPATIENT
Start: 2023-12-26 | End: 2023-12-26

## 2023-12-26 RX ORDER — HYDROMORPHONE HCL/PF 1 MG/ML
0.5 SYRINGE (ML) INJECTION
Status: DISCONTINUED | OUTPATIENT
Start: 2023-12-26 | End: 2023-12-31 | Stop reason: HOSPADM

## 2023-12-26 RX ADMIN — Medication 3.38 G: at 19:12

## 2023-12-26 RX ADMIN — HYDROMORPHONE HYDROCHLORIDE 0.5 MG: 1 INJECTION, SOLUTION INTRAMUSCULAR; INTRAVENOUS; SUBCUTANEOUS at 20:29

## 2023-12-26 RX ADMIN — HYDROMORPHONE HYDROCHLORIDE 0.5 MG: 1 INJECTION, SOLUTION INTRAMUSCULAR; INTRAVENOUS; SUBCUTANEOUS at 14:03

## 2023-12-26 RX ADMIN — ONDANSETRON 4 MG: 2 INJECTION INTRAMUSCULAR; INTRAVENOUS at 16:56

## 2023-12-26 RX ADMIN — SODIUM CHLORIDE 1000 ML: 0.9 INJECTION, SOLUTION INTRAVENOUS at 16:26

## 2023-12-26 RX ADMIN — PROMETHAZINE HYDROCHLORIDE 12.5 MG: 25 INJECTION INTRAMUSCULAR; INTRAVENOUS at 18:37

## 2023-12-26 RX ADMIN — HYDROMORPHONE HYDROCHLORIDE 0.5 MG: 1 INJECTION, SOLUTION INTRAMUSCULAR; INTRAVENOUS; SUBCUTANEOUS at 18:10

## 2023-12-26 RX ADMIN — IOHEXOL 100 ML: 350 INJECTION, SOLUTION INTRAVENOUS at 15:18

## 2023-12-26 RX ADMIN — SODIUM CHLORIDE 125 ML/HR: 0.9 INJECTION, SOLUTION INTRAVENOUS at 20:00

## 2023-12-26 RX ADMIN — SODIUM CHLORIDE 500 ML: 0.9 INJECTION, SOLUTION INTRAVENOUS at 19:12

## 2023-12-26 RX ADMIN — HYDROMORPHONE HYDROCHLORIDE 0.5 MG: 1 INJECTION, SOLUTION INTRAMUSCULAR; INTRAVENOUS; SUBCUTANEOUS at 15:27

## 2023-12-26 RX ADMIN — ONDANSETRON 4 MG: 2 INJECTION INTRAMUSCULAR; INTRAVENOUS at 13:51

## 2023-12-26 RX ADMIN — ONDANSETRON 4 MG: 2 INJECTION INTRAMUSCULAR; INTRAVENOUS at 15:29

## 2023-12-26 RX ADMIN — SODIUM CHLORIDE 1000 ML: 0.9 INJECTION, SOLUTION INTRAVENOUS at 13:53

## 2023-12-26 RX ADMIN — HYDROMORPHONE HYDROCHLORIDE 0.5 MG: 1 INJECTION, SOLUTION INTRAMUSCULAR; INTRAVENOUS; SUBCUTANEOUS at 23:59

## 2023-12-26 RX ADMIN — LEVOFLOXACIN 500 MG: 500 INJECTION, SOLUTION INTRAVENOUS at 16:48

## 2023-12-26 NOTE — ED PROVIDER NOTES
History  Chief Complaint   Patient presents with    Abdominal Pain     Pt started with lower abd pain and vomiting today. Recent abd surgery.      28-year-old male presents the ED with extensive abdominal surgical history.  States he started complaining of vomiting diarrhea and abdominal pain this morning.  States he has been trying to keep water down but he keeps vomiting that up bloody and able to keep that down at this point.  No fevers chills.  No other noticeable complaints at this point.  Patient does appear to be in quite a bit of distress with abdominal discomfort.        None       Past Medical History:   Diagnosis Date    Ankylosing spondylitis (HCC)     Anxiety     Bowel obstruction (HCC)     Clostridium difficile colitis 9/13/2018    Colitis     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HCC) 9/13/2018    Pancreatitis     Ulcerative colitis (HCC)        Past Surgical History:   Procedure Laterality Date    COLECTOMY TOTAL      with ileal pouch and anastemosis    IR PICC PLACEMENT SINGLE LUMEN  3/1/2022    TOTAL COLECTOMY         Family History   Problem Relation Age of Onset    Lung disease Neg Hx      I have reviewed and agree with the history as documented.    E-Cigarette/Vaping    E-Cigarette Use Never User      E-Cigarette/Vaping Substances    Nicotine No     THC No     CBD No     Flavoring No     Other No     Unknown No      Social History     Tobacco Use    Smoking status: Never    Smokeless tobacco: Never   Vaping Use    Vaping status: Never Used   Substance Use Topics    Alcohol use: Not Currently     Comment: pt states 5 a month/socially    Drug use: No       Review of Systems   Constitutional:  Negative for activity change, chills, diaphoresis and fever.   HENT:  Negative for congestion, ear pain, nosebleeds, sore throat, trouble swallowing and voice change.    Eyes:  Negative for pain, discharge and redness.   Respiratory:  Negative for apnea, cough, choking, shortness of breath, wheezing and stridor.     Cardiovascular:  Negative for chest pain and palpitations.   Gastrointestinal:  Positive for abdominal pain, diarrhea, nausea and vomiting. Negative for abdominal distention and constipation.   Endocrine: Negative for polydipsia.   Genitourinary:  Negative for difficulty urinating, dysuria, flank pain, frequency, hematuria and urgency.   Musculoskeletal:  Negative for back pain, gait problem, joint swelling, myalgias, neck pain and neck stiffness.   Skin:  Negative for pallor and rash.   Neurological:  Negative for dizziness, tremors, syncope, speech difficulty, weakness, numbness and headaches.   Hematological:  Negative for adenopathy.   Psychiatric/Behavioral:  Negative for confusion, hallucinations, self-injury and suicidal ideas. The patient is not nervous/anxious.        Physical Exam  Physical Exam  Vitals and nursing note reviewed.   Constitutional:       General: He is not in acute distress.     Appearance: He is well-developed and normal weight. He is not diaphoretic.   HENT:      Head: Normocephalic and atraumatic.      Right Ear: External ear normal.      Left Ear: External ear normal.      Nose: Nose normal.   Eyes:      Conjunctiva/sclera: Conjunctivae normal.      Pupils: Pupils are equal, round, and reactive to light.   Cardiovascular:      Rate and Rhythm: Normal rate and regular rhythm.      Heart sounds: Normal heart sounds.   Pulmonary:      Effort: Pulmonary effort is normal.      Breath sounds: Normal breath sounds.   Abdominal:      General: Bowel sounds are normal.      Palpations: Abdomen is soft.      Tenderness: There is generalized abdominal tenderness and tenderness in the epigastric area.      Comments: Diffuse tenderness   Musculoskeletal:         General: Normal range of motion.      Cervical back: Normal range of motion and neck supple.   Skin:     General: Skin is warm and dry.   Neurological:      Mental Status: He is alert and oriented to person, place, and time.      Deep  Tendon Reflexes: Reflexes are normal and symmetric.         Vital Signs  ED Triage Vitals   Temperature Pulse Respirations Blood Pressure SpO2   12/26/23 1334 12/26/23 1334 12/26/23 1334 12/26/23 1334 12/26/23 1334   97.7 °F (36.5 °C) 98 20 123/68 100 %      Temp Source Heart Rate Source Patient Position - Orthostatic VS BP Location FiO2 (%)   12/26/23 1334 12/26/23 1334 12/26/23 1916 12/26/23 1916 --   Oral Monitor Lying Right arm       Pain Score       12/26/23 1334       8           Vitals:    12/26/23 1334 12/26/23 1916 12/26/23 1945   BP: 123/68 116/68 108/62   Pulse: 98 91 98   Patient Position - Orthostatic VS:  Lying Lying         Visual Acuity      ED Medications  Medications   sodium chloride 0.9 % bolus 500 mL (500 mL Intravenous New Bag 12/26/23 1912)   piperacillin-tazobactam (ZOSYN) IVPB 3.375 g (3.375 g Intravenous New Bag 12/26/23 1912)   sodium chloride 0.9 % infusion (has no administration in time range)   ondansetron (ZOFRAN) injection 4 mg (has no administration in time range)   HYDROmorphone (DILAUDID) injection 0.5 mg (has no administration in time range)   HYDROmorphone (DILAUDID) injection 0.2 mg (has no administration in time range)   enoxaparin (LOVENOX) subcutaneous injection 40 mg (has no administration in time range)   sodium chloride 0.9 % bolus 1,000 mL (0 mL Intravenous Stopped 12/26/23 1638)   ondansetron (ZOFRAN) injection 4 mg (4 mg Intravenous Given 12/26/23 1351)   HYDROmorphone (DILAUDID) injection 0.5 mg (0.5 mg Intravenous Given 12/26/23 1403)   iohexol (OMNIPAQUE) 350 MG/ML injection (MULTI-DOSE) 100 mL (100 mL Intravenous Given 12/26/23 1518)   HYDROmorphone (DILAUDID) injection 0.5 mg (0.5 mg Intravenous Given 12/26/23 1527)   ondansetron (ZOFRAN) injection 4 mg (4 mg Intravenous Given 12/26/23 1529)   sodium chloride 0.9 % bolus 1,000 mL (0 mL Intravenous Stopped 12/26/23 1726)   levofloxacin (LEVAQUIN) IVPB (premix in dextrose) 500 mg 100 mL (0 mg Intravenous Stopped  12/26/23 1748)   ondansetron (ZOFRAN) injection 4 mg (4 mg Intravenous Given 12/26/23 1656)   HYDROmorphone (DILAUDID) injection 0.5 mg (0.5 mg Intravenous Given 12/26/23 1810)   promethazine (PHENERGAN) injection 12.5 mg (12.5 mg Intravenous Given 12/26/23 1837)       Diagnostic Studies  Results Reviewed       Procedure Component Value Units Date/Time    Lactic acid, plasma (w/reflex if result > 2.0) [[id number]]     Lab Status: No result Specimen: Blood     Procalcitonin [[id number]]     Lab Status: No result Specimen: Blood     Clostridium difficile toxin by PCR with EIA [[id number]]     Lab Status: No result Specimen: Stool     Stool Enteric Bacterial Panel by PCR [[id number]]     Lab Status: No result Specimen: Stool     Lactic acid 2 Hours [[id number]]  (Abnormal) Collected: 12/26/23 1751    Lab Status: Final result Specimen: Blood from Line, Venous Updated: 12/26/23 1826     LACTIC ACID 2.4 mmol/L     Narrative:      Result may be elevated if tourniquet was used during collection.    Lactic acid, plasma (w/reflex if result > 2.0) [[id number]]  (Abnormal) Collected: 12/26/23 1518    Lab Status: Final result Specimen: Blood from Line, Venous Updated: 12/26/23 1618     LACTIC ACID 3.1 mmol/L     Narrative:      Result may be elevated if tourniquet was used during collection.    Blood culture #1 [[id number]] Collected: 12/26/23 1516    Lab Status: In process Specimen: Blood from Arm, Left Updated: 12/26/23 1527    Blood culture #2 [[id number]] Collected: 12/26/23 1516    Lab Status: In process Specimen: Blood from Arm, Left Updated: 12/26/23 1526    Lipase [[id number]]  (Abnormal) Collected: 12/26/23 1409    Lab Status: Final result Specimen: Blood from Hand, Right Updated: 12/26/23 1432     Lipase 9 u/L     Comprehensive metabolic panel [[id number]]  (Abnormal) Collected: 12/26/23 1409    Lab Status: Final result Specimen: Blood from Hand, Right Updated: 12/26/23 1432     Sodium 138 mmol/L      Potassium 4.9 mmol/L       Chloride 110 mmol/L      CO2 20 mmol/L      ANION GAP 8 mmol/L      BUN 12 mg/dL      Creatinine 0.78 mg/dL      Glucose 80 mg/dL      Calcium 7.3 mg/dL      AST 31 U/L      ALT 10 U/L      Alkaline Phosphatase 54 U/L      Total Protein 6.3 g/dL      Albumin 3.8 g/dL      Total Bilirubin 0.87 mg/dL      eGFR 121 ml/min/1.73sq m     Narrative:      National Kid[employer] Disease Foundation guidelines for Chronic Kidney Disease (CKD):     Stage 1 with normal or high GFR (GFR > 90 mL/min/1.73 square meters)    Stage 2 Mild CKD (GFR = 60-89 mL/min/1.73 square meters)    Stage 3A Moderate CKD (GFR = 45-59 mL/min/1.73 square meters)    Stage 3B Moderate CKD (GFR = 30-44 mL/min/1.73 square meters)    Stage 4 Severe CKD (GFR = 15-29 mL/min/1.73 square meters)    Stage 5 End Stage CKD (GFR <15 mL/min/1.73 square meters)  Note: GFR calculation is accurate only with a steady state creatinine    Urine culture [[id number]]     Lab Status: No result Specimen: Urine, Clean Catch     UA (URINE) with reflex to Scope [[id number]]     Lab Status: No result Specimen: Urine     CBC and differential [[id number]]  (Abnormal) Collected: 12/26/23 1349    Lab Status: Final result Specimen: Blood from Hand, Right Updated: 12/26/23 1403     WBC 23.67 Thousand/uL      RBC 7.32 Million/uL      Hemoglobin 14.0 g/dL      Hematocrit 46.2 %      MCV 63 fL      MCH 19.1 pg      MCHC 30.3 g/dL      RDW 19.9 %      MPV 9.9 fL      Platelets 275 Thousands/uL      nRBC 0 /100 WBCs      Neutrophils Relative 89 %      Immat GRANS % 1 %      Lymphocytes Relative 2 %      Monocytes Relative 6 %      Eosinophils Relative 1 %      Basophils Relative 1 %      Neutrophils Absolute 21.25 Thousands/µL      Immature Grans Absolute 0.13 Thousand/uL      Lymphocytes Absolute 0.50 Thousands/µL      Monocytes Absolute 1.45 Thousand/µL      Eosinophils Absolute 0.23 Thousand/µL      Basophils Absolute 0.11 Thousands/µL                    CT abdomen pelvis with contrast   Final  "Result by Porfirio Romero MD (12/26 8463)      Fluid collection again noted in the right anterior abdominal wall appears less amorphous and more formed with an enhancing wall suspicious for abscess currently measuring 3.9 x 3.8 cm on image 601/29.      Improvement in small bowel dilatation during the interval.      Likely reactive pelvic lymphadenopathy unchanged.               Workstation performed: [id number]                    Procedures  Procedures         ED Course                            Initial Sepsis Screening       Row Name 12/26/23 1924                Is the patient's history suggestive of a new or worsening infection? Yes (Proceed)  -EDGAR        Suspected source of infection acute abdominal infection  -EDGAR        Indicate SIRS criteria Tachycardia > 90 bpm;Leukocytosis (WBC > 63463 IJL) OR Leukopenia (WBC <4000 IJL) OR Bandemia (WBC >10% bands)  -EDGAR        Are two or more of the above signs & symptoms of infection both present and new to the patient? Yes (Proceed)  -EDGAR        Assess for evidence of organ dysfunction: Are any of the below criteria present within 6 hours of suspected infection and SIRS criteria that are NOT considered to be chronic conditions? Lactate > 2.0  -        Date of presentation of severe sepsis --        Time of presentation of severe sepsis --        Sepsis Note: Click \"NEXT\" below (NOT \"close\") to generate sepsis note based on above information. --                  User Key  (r) = Recorded By, (t) = Taken By, (c) = Cosigned By      Initials Name Provider Type    EDGAR Cartwright DO Physician                  Default Flowsheet Data (last 720 hours)       Sepsis Reassess       Row Name 12/26/23 1925                   Repeat Volume Status and Tissue Perfusion Assessment Performed    Date of Reassessment: 12/26/23  -        Time of Reassessment: 1830  -        Sepsis Reassessment Note: Click \"NEXT\" below (NOT \"close\") to generate sepsis reassessment note. --        Repeat " Volume Status and Tissue Perfusion Assessment Performed --                  User Key  (r) = Recorded By, (t) = Taken By, (c) = Cosigned By      Initials Name Provider Type    EDGAR Cartwright DO Physician                                Medical Decision Making  Patient has probable abscess on the anterior abdominal wall discussed with Dr. Myers from surgery who advised medicine admit and IR to evaluate abscess.  Patient current lactic acid 3.1 WBC 24.  Patient has been cultured up he is currently getting 2500 cc normal saline fluid as well as Levaquin IV.  We will repeat lactic acid at the appropriate time    Amount and/or Complexity of Data Reviewed  Labs: ordered.  Radiology: ordered.     Details: Anterior abdominal wall abscess most likely reported by radiologist  Discussion of management or test interpretation with external provider(s): Patient to be admitted to hospitalist service for nausea vomiting diarrhea.  As well as abdominal pain with probable anterior abdominal wall abscess    Risk  Prescription drug management.  Decision regarding hospitalization.             Disposition  Final diagnoses:   Generalized abdominal pain   Nausea   Vomiting     Time reflects when diagnosis was documented in both MDM as applicable and the Disposition within this note       Time User Action Codes Description Comment    12/26/2023  4:47 PM Lemuel Cartwright Add [R10.84] Generalized abdominal pain     12/26/2023  4:47 PM Lemuel Cartwright Add [R11.0] Nausea     12/26/2023  4:47 PM Lemuel Cartwright Add [R11.10] Vomiting     12/26/2023  7:19 PM Darlin Martinez Add [L02.211] Abdominal wall abscess           ED Disposition       ED Disposition   Admit    Condition   Stable    Date/Time   Tue Dec 26, 2023  4:47 PM    Comment   Case was discussed with Dr. Martinez and the patient's admission status was agreed to be Admission Status: observation status to the service of Dr. Martinez .               Follow-up Information    None          Patient's Medications    No medications on file       No discharge procedures on file.    PDMP Review         Value Time User    PDMP Reviewed  Yes 12/26/2023  7:25 PM Darlin Martinez DO            ED Provider  Electronically Signed by             Lemuel Cartwright DO  12/26/23 1953

## 2023-12-26 NOTE — H&P
"[facility]  H&P  Name: Nico Cruz 28 y.o. male I MRN: [id number]  Unit/Bed#: ED 14 I Date of Admission: 12/26/2023   Date of Service: 12/26/2023 I Hospital Day: 0      Assessment/Plan   * Sepsis (HCC)  Assessment & Plan  As noted by leukocytosis, tachycardia likely due to abdominal wall abscess  Also noted to have lactic acidosis but this could be partly related to dehydration, persistent vomiting  Lactate is improving  Fluid boluses given in the ER and will place patient on maintenance IV fluids  Repeat lactate till less than 2  Follow-up pending blood cultures.  Repeat lab work in a.m.    Abdominal wall abscess  Assessment & Plan  Patient presented to the ER with complaints of abdominal pain  Imaging done in the ER showed fluid collection concerning for abscess in the right anterior abdominal wall  Patient received a dose of IV Levaquin in the ER.  Will change to IV Zosyn  Case was discussed with surgery by ER who recommended IR consult for drain placement.  IR has been consulted  IV Dilaudid as needed for pain management    Diarrhea  Assessment & Plan  Patient presents with multiple episodes of diarrhea, nausea, vomiting that started today.  Family members at home sick as well with \"flu\" symptoms  Likely gastroenteritis  Symptom management with IV fluids, IV Zofran as needed  N.p.o. for today.  Will slowly advance as tolerated as symptoms improve    VTE Pharmacologic Prophylaxis:    Lovenox  Code Status: Level 1 - Full Code   Discussion with family: Updated  (father) at bedside.    Anticipated Length of Stay: Patient will be admitted on an observation basis with an anticipated length of stay of less than 2 midnights secondary to abdominal wall abscess.    Total Time Spent on Date of Encounter in care of patient: This time was spent on one or more of the following: performing physical exam; counseling and coordination of care; obtaining or reviewing history; documenting " "in the medical record; reviewing/ordering tests, medications or procedures; communicating with other healthcare professionals and discussing with patient's family/caregivers.    Chief Complaint: Abdominal pain, nausea, vomiting    History of Present Illness:  Nico Cruz is a 28 y.o. male who presents with abdominal pain, multiple episodes of nausea, vomiting and diarrhea that started today.  Reports other family members at home are sick with \"flu\" symptoms.  Patient denies eating anything out of the ordinary.  No blood.  On imaging noted to have abscess of abdominal wall.    Review of Systems:  Review of Systems   Constitutional:  Positive for appetite change and chills. Negative for fever.   HENT:  Negative for congestion and trouble swallowing.    Eyes:  Negative for photophobia and visual disturbance.   Respiratory:  Negative for chest tightness and shortness of breath.    Cardiovascular:  Negative for chest pain and leg swelling.   Gastrointestinal:  Positive for abdominal pain, diarrhea, nausea and vomiting.   Genitourinary:  Negative for dysuria, frequency and hematuria.   Musculoskeletal:  Negative for neck pain.   Skin:  Negative for wound.   Neurological:  Positive for dizziness. Negative for syncope.   Hematological:  Does not bruise/bleed easily.   Psychiatric/Behavioral:  Negative for agitation.        Past Medical and Surgical History:   Past Medical History:   Diagnosis Date    Ankylosing spondylitis (HCC)     Anxiety     Bowel obstruction (HCC)     Clostridium difficile colitis 9/13/2018    Colitis     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HCC) 9/13/2018    Pancreatitis     Ulcerative colitis (HCC)        Past Surgical History:   Procedure Laterality Date    COLECTOMY TOTAL      with ileal pouch and anastemosis    IR PICC PLACEMENT SINGLE LUMEN  3/1/2022    TOTAL COLECTOMY         Meds/Allergies:  Prior to Admission medications    Not on File     I have reviewed home medications with patient personally.    Allergies: "   Allergies   Allergen Reactions    Cefazolin Hives     Other reaction(s): Arrythmia (Abnormal Heartbeat), Rash Maculopapular    Mesalamine GI Intolerance     Other reaction(s): Pancreatitis  Annotation - [id number]: pancreatitis    Silver Rash     Coloplast ostomy Products    Wound Dressings Rash     [employer]oplast ostomy Products        Social History:  Marital Status: Single   Occupation: Polic Officer  Patient Pre-hospital Living Situation: Family  Patient Pre-hospital Level of Mobility: walks  Patient Pre-hospital Diet Restrictions: none  Substance Use History:   Social History     Substance and Sexual Activity   Alcohol Use Not Currently    Comment: pt states 5 a month/socially     Social History     Tobacco Use   Smoking Status Never   Smokeless Tobacco Never     Social History     Substance and Sexual Activity   Drug Use No       Family History:  Family History   Problem Relation Age of Onset    Lung disease Neg Hx        Physical Exam:     Vitals:   Blood Pressure: 123/68 (12/26/23 1334)  Pulse: 98 (12/26/23 1334)  Temperature: 97.7 °F (36.5 °C) (12/26/23 1334)  Temp Source: Oral (12/26/23 1334)  Respirations: 20 (12/26/23 1334)  SpO2: 100 % (12/26/23 1334)    Physical Exam  Vitals reviewed.   Constitutional:       General: He is not in acute distress.     Appearance: He is not toxic-appearing.   HENT:      Head: Normocephalic and atraumatic.   Eyes:      General:         Right eye: No discharge.         Left eye: No discharge.   Cardiovascular:      Rate and Rhythm: Normal rate and regular rhythm.   Pulmonary:      Effort: Pulmonary effort is normal. No respiratory distress.      Breath sounds: Normal breath sounds. No wheezing or rales.   Abdominal:      General: Bowel sounds are normal. There is no distension.      Palpations: Abdomen is soft.      Tenderness: There is abdominal tenderness (lower abdomen close to prior ostomy site). There is no guarding.      Comments: Prior ostomy site with dressing in place  with no drainage noted   Musculoskeletal:      Right lower leg: No edema.      Left lower leg: No edema.   Neurological:      Mental Status: He is alert and oriented to person, place, and time.          Additional Data:     Lab Results:  Results from last 7 days   Lab Units 12/26/23  1349   WBC Thousand/uL 23.67*   HEMOGLOBIN g/dL 14.0   HEMATOCRIT % 46.2   PLATELETS Thousands/uL 275   NEUTROS PCT % 89*   LYMPHS PCT % 2*   MONOS PCT % 6   EOS PCT % 1     Results from last 7 days   Lab Units 12/26/23  1409   SODIUM mmol/L 138   POTASSIUM mmol/L 4.9   CHLORIDE mmol/L 110*   CO2 mmol/L 20*   BUN mg/dL 12   CREATININE mg/dL 0.78   ANION GAP mmol/L 8   CALCIUM mg/dL 7.3*   ALBUMIN g/dL 3.8   TOTAL BILIRUBIN mg/dL 0.87   ALK PHOS U/L 54   ALT U/L 10   AST U/L 31   GLUCOSE RANDOM mg/dL 80                 Results from last 7 days   Lab Units 12/26/23  1751 12/26/23  1518   LACTIC ACID mmol/L 2.4* 3.1*       Lines/Drains:  Invasive Devices       Peripheral Intravenous Line  Duration             Peripheral IV 12/26/23 Dorsal (posterior);Right Hand <1 day    Peripheral IV 12/26/23 Left;Ventral (anterior) Hand <1 day    Peripheral IV 12/26/23 Right;Upper Arm <1 day                        Imaging: Reviewed radiology reports from this admission including: abdominal/pelvic CT  CT abdomen pelvis with contrast   Final Result by Porfirio Romero MD (12/26 1555)      Fluid collection again noted in the right anterior abdominal wall appears less amorphous and more formed with an enhancing wall suspicious for abscess currently measuring 3.9 x 3.8 cm on image 601/29.      Improvement in small bowel dilatation during the interval.      Likely reactive pelvic lymphadenopathy unchanged.               Workstation performed: [id number]             EKG and Other Studies Reviewed on Admission:   EKG:  no EKG noted.    ** Please Note: This note has been constructed using a voice recognition system. **

## 2023-12-27 ENCOUNTER — APPOINTMENT (OUTPATIENT)
Dept: RADIOLOGY | Facility: HOSPITAL | Age: 28
DRG: 854 | End: 2023-12-27
Payer: COMMERCIAL

## 2023-12-27 ENCOUNTER — TELEPHONE (OUTPATIENT)
Age: 28
End: 2023-12-27

## 2023-12-27 PROBLEM — D75.839 THROMBOCYTOSIS: Status: ACTIVE | Noted: 2023-12-27

## 2023-12-27 LAB
ANION GAP SERPL CALCULATED.3IONS-SCNC: 6 MMOL/L
BACTERIA UR QL AUTO: ABNORMAL /HPF
BILIRUB UR QL STRIP: NEGATIVE
BUN SERPL-MCNC: 10 MG/DL (ref 5–25)
CALCIUM SERPL-MCNC: 8.8 MG/DL (ref 8.4–10.2)
CHLORIDE SERPL-SCNC: 102 MMOL/L (ref 96–108)
CLARITY UR: CLEAR
CO2 SERPL-SCNC: 27 MMOL/L (ref 21–32)
COLOR UR: YELLOW
CREAT SERPL-MCNC: 0.92 MG/DL (ref 0.6–1.3)
ERYTHROCYTE [DISTWIDTH] IN BLOOD BY AUTOMATED COUNT: 18.9 % (ref 11.6–15.1)
GFR SERPL CREATININE-BSD FRML MDRD: 111 ML/MIN/1.73SQ M
GLUCOSE P FAST SERPL-MCNC: 91 MG/DL (ref 65–99)
GLUCOSE SERPL-MCNC: 91 MG/DL (ref 65–140)
GLUCOSE UR STRIP-MCNC: NEGATIVE MG/DL
HCT VFR BLD AUTO: 35.7 % (ref 36.5–49.3)
HGB BLD-MCNC: 11.2 G/DL (ref 12–17)
HGB UR QL STRIP.AUTO: NEGATIVE
HYALINE CASTS #/AREA URNS LPF: ABNORMAL /LPF
KETONES UR STRIP-MCNC: ABNORMAL MG/DL
LEUKOCYTE ESTERASE UR QL STRIP: NEGATIVE
MCH RBC QN AUTO: 19.5 PG (ref 26.8–34.3)
MCHC RBC AUTO-ENTMCNC: 31.4 G/DL (ref 31.4–37.4)
MCV RBC AUTO: 62 FL (ref 82–98)
MUCOUS THREADS UR QL AUTO: ABNORMAL
NITRITE UR QL STRIP: NEGATIVE
NON-SQ EPI CELLS URNS QL MICRO: ABNORMAL /HPF
PH UR STRIP.AUTO: 7 [PH]
PLATELET # BLD AUTO: 394 THOUSANDS/UL (ref 149–390)
PMV BLD AUTO: 8.9 FL (ref 8.9–12.7)
POTASSIUM SERPL-SCNC: 3.5 MMOL/L (ref 3.5–5.3)
PROCALCITONIN SERPL-MCNC: 0.31 NG/ML
PROT UR STRIP-MCNC: ABNORMAL MG/DL
RBC # BLD AUTO: 5.73 MILLION/UL (ref 3.88–5.62)
RBC #/AREA URNS AUTO: ABNORMAL /HPF
SODIUM SERPL-SCNC: 135 MMOL/L (ref 135–147)
SP GR UR STRIP.AUTO: 1.02 (ref 1–1.03)
UROBILINOGEN UR QL STRIP.AUTO: 0.2 E.U./DL
WBC # BLD AUTO: 8.49 THOUSAND/UL (ref 4.31–10.16)
WBC #/AREA URNS AUTO: ABNORMAL /HPF

## 2023-12-27 PROCEDURE — 99152 MOD SED SAME PHYS/QHP 5/>YRS: CPT

## 2023-12-27 PROCEDURE — 84145 PROCALCITONIN (PCT): CPT | Performed by: FAMILY MEDICINE

## 2023-12-27 PROCEDURE — 87075 CULTR BACTERIA EXCEPT BLOOD: CPT | Performed by: FAMILY MEDICINE

## 2023-12-27 PROCEDURE — 10160 PNXR ASPIR ABSC HMTMA BULLA: CPT | Performed by: RADIOLOGY

## 2023-12-27 PROCEDURE — 87077 CULTURE AEROBIC IDENTIFY: CPT | Performed by: FAMILY MEDICINE

## 2023-12-27 PROCEDURE — 87493 C DIFF AMPLIFIED PROBE: CPT | Performed by: FAMILY MEDICINE

## 2023-12-27 PROCEDURE — 49406 IMAGE CATH FLUID PERI/RETRO: CPT

## 2023-12-27 PROCEDURE — 87205 SMEAR GRAM STAIN: CPT | Performed by: FAMILY MEDICINE

## 2023-12-27 PROCEDURE — NC001 PR NO CHARGE: Performed by: RADIOLOGY

## 2023-12-27 PROCEDURE — 87505 NFCT AGENT DETECTION GI: CPT | Performed by: FAMILY MEDICINE

## 2023-12-27 PROCEDURE — 80048 BASIC METABOLIC PNL TOTAL CA: CPT | Performed by: FAMILY MEDICINE

## 2023-12-27 PROCEDURE — 81001 URINALYSIS AUTO W/SCOPE: CPT | Performed by: FAMILY MEDICINE

## 2023-12-27 PROCEDURE — 99232 SBSQ HOSP IP/OBS MODERATE 35: CPT | Performed by: FAMILY MEDICINE

## 2023-12-27 PROCEDURE — C1729 CATH, DRAINAGE: HCPCS

## 2023-12-27 PROCEDURE — 99152 MOD SED SAME PHYS/QHP 5/>YRS: CPT | Performed by: RADIOLOGY

## 2023-12-27 PROCEDURE — 85027 COMPLETE CBC AUTOMATED: CPT | Performed by: FAMILY MEDICINE

## 2023-12-27 PROCEDURE — 87086 URINE CULTURE/COLONY COUNT: CPT | Performed by: FAMILY MEDICINE

## 2023-12-27 PROCEDURE — 87070 CULTURE OTHR SPECIMN AEROBIC: CPT | Performed by: FAMILY MEDICINE

## 2023-12-27 PROCEDURE — 87186 SC STD MICRODIL/AGAR DIL: CPT | Performed by: FAMILY MEDICINE

## 2023-12-27 PROCEDURE — 77012 CT SCAN FOR NEEDLE BIOPSY: CPT | Performed by: RADIOLOGY

## 2023-12-27 PROCEDURE — 0W9F3ZX DRAINAGE OF ABDOMINAL WALL, PERCUTANEOUS APPROACH, DIAGNOSTIC: ICD-10-PCS | Performed by: RADIOLOGY

## 2023-12-27 PROCEDURE — 99204 OFFICE O/P NEW MOD 45 MIN: CPT | Performed by: SPECIALIST

## 2023-12-27 RX ORDER — FENTANYL CITRATE 50 UG/ML
INJECTION, SOLUTION INTRAMUSCULAR; INTRAVENOUS AS NEEDED
Status: COMPLETED | OUTPATIENT
Start: 2023-12-27 | End: 2023-12-27

## 2023-12-27 RX ORDER — MIDAZOLAM HYDROCHLORIDE 2 MG/2ML
INJECTION, SOLUTION INTRAMUSCULAR; INTRAVENOUS AS NEEDED
Status: COMPLETED | OUTPATIENT
Start: 2023-12-27 | End: 2023-12-27

## 2023-12-27 RX ADMIN — ONDANSETRON 4 MG: 2 INJECTION INTRAMUSCULAR; INTRAVENOUS at 00:00

## 2023-12-27 RX ADMIN — ONDANSETRON 4 MG: 2 INJECTION INTRAMUSCULAR; INTRAVENOUS at 11:29

## 2023-12-27 RX ADMIN — MIDAZOLAM 1 MG: 1 INJECTION INTRAMUSCULAR; INTRAVENOUS at 14:37

## 2023-12-27 RX ADMIN — Medication 3.38 G: at 18:16

## 2023-12-27 RX ADMIN — HYDROMORPHONE HYDROCHLORIDE 0.5 MG: 1 INJECTION, SOLUTION INTRAMUSCULAR; INTRAVENOUS; SUBCUTANEOUS at 18:16

## 2023-12-27 RX ADMIN — Medication 3.38 G: at 12:17

## 2023-12-27 RX ADMIN — ENOXAPARIN SODIUM 40 MG: 40 INJECTION SUBCUTANEOUS at 09:09

## 2023-12-27 RX ADMIN — HYDROMORPHONE HYDROCHLORIDE 0.5 MG: 1 INJECTION, SOLUTION INTRAMUSCULAR; INTRAVENOUS; SUBCUTANEOUS at 21:18

## 2023-12-27 RX ADMIN — Medication 3.38 G: at 02:01

## 2023-12-27 RX ADMIN — HYDROMORPHONE HYDROCHLORIDE 0.5 MG: 1 INJECTION, SOLUTION INTRAMUSCULAR; INTRAVENOUS; SUBCUTANEOUS at 15:14

## 2023-12-27 RX ADMIN — FENTANYL CITRATE 50 MCG: 50 INJECTION, SOLUTION INTRAMUSCULAR; INTRAVENOUS at 14:37

## 2023-12-27 RX ADMIN — ONDANSETRON 4 MG: 2 INJECTION INTRAMUSCULAR; INTRAVENOUS at 21:49

## 2023-12-27 RX ADMIN — HYDROMORPHONE HYDROCHLORIDE 0.5 MG: 1 INJECTION, SOLUTION INTRAMUSCULAR; INTRAVENOUS; SUBCUTANEOUS at 03:22

## 2023-12-27 RX ADMIN — Medication 3.38 G: at 07:29

## 2023-12-27 RX ADMIN — HYDROMORPHONE HYDROCHLORIDE 0.5 MG: 1 INJECTION, SOLUTION INTRAMUSCULAR; INTRAVENOUS; SUBCUTANEOUS at 11:30

## 2023-12-27 RX ADMIN — SODIUM CHLORIDE 125 ML/HR: 0.9 INJECTION, SOLUTION INTRAVENOUS at 18:16

## 2023-12-27 RX ADMIN — SODIUM CHLORIDE 125 ML/HR: 0.9 INJECTION, SOLUTION INTRAVENOUS at 07:32

## 2023-12-27 RX ADMIN — HYDROMORPHONE HYDROCHLORIDE 0.5 MG: 1 INJECTION, SOLUTION INTRAMUSCULAR; INTRAVENOUS; SUBCUTANEOUS at 07:34

## 2023-12-27 NOTE — ASSESSMENT & PLAN NOTE
As noted by leukocytosis, tachycardia likely due to abdominal wall abscess  Also noted to have lactic acidosis but this is likely related to dehydration, persistent vomiting  Lactic acidosis resolved  Fluid boluses given in the ER and currently on IV fluids  Follow-up pending blood cultures.   Leukocytosis is resolved today within 24 hours and therefore leukocytosis is at least partly reactive in nature/hemoconcentrated  Repeat lab work in a.m.

## 2023-12-27 NOTE — PLAN OF CARE
Problem: PAIN - ADULT  Goal: Verbalizes/displays adequate comfort level or baseline comfort level  Description: Interventions:  - Encourage patient to monitor pain and request assistance  - Assess pain using appropriate pain scale  - Administer analgesics based on type and severity of pain and evaluate response  - Implement non-pharmacological measures as appropriate and evaluate response  - Consider cultural and social influences on pain and pain management  - Notify physician/advanced practitioner if interventions unsuccessful or patient reports new pain  Outcome: Progressing     Problem: INFECTION - ADULT  Goal: Absence or prevention of progression during hospitalization  Description: INTERVENTIONS:  - Assess and monitor for signs and symptoms of infection  - Monitor lab/diagnostic results  - Monitor all insertion sites, i.e. indwelling lines, tubes, and drains  - Monitor endotracheal if appropriate and nasal secretions for changes in amount and color  - [place] appropriate cooling/warming therapies per order  - Administer medications as ordered  - Instruct and encourage patient and family to use good hand hygiene technique  - Identify and instruct in appropriate isolation precautions for identified infection/condition  Outcome: Progressing  Goal: Absence of fever/infection during neutropenic period  Description: INTERVENTIONS:  - Monitor WBC    Outcome: Progressing     Problem: SAFETY ADULT  Goal: Patient will remain free of falls  Description: INTERVENTIONS:  - Educate patient/family on patient safety including physical limitations  - Instruct patient to call for assistance with activity   - Consult OT/PT to assist with strengthening/mobility   - Keep Call bell within reach  - Keep bed low and locked with side rails adjusted as appropriate  - Keep care items and personal belongings within reach  - Initiate and maintain comfort rounds  - Make Fall Risk Sign visible to staff  - Offer Toileting every 2 Hours,  in advance of need  - Apply yellow socks and bracelet for high fall risk patients  - Consider moving patient to room near nurses station  Outcome: Progressing  Goal: Maintain or return to baseline ADL function  Description: INTERVENTIONS:  -  Assess patient's ability to carry out ADLs; assess patient's baseline for ADL function and identify physical deficits which impact ability to perform ADLs (bathing, care of mouth/teeth, toileting, grooming, dressing, etc.)  - Assess/evaluate cause of self-care deficits   - Assess range of motion  - Assess patient's mobility; develop plan if impaired  - Assess patient's need for assistive devices and provide as appropriate  - Encourage maximum independence but intervene and supervise when necessary  - Involve family in performance of ADLs  - Assess for home care needs following discharge   - Consider OT consult to assist with ADL evaluation and planning for discharge  - Provide patient education as appropriate  Outcome: Progressing  Goal: Maintains/Returns to pre admission functional level  Description: INTERVENTIONS:  - Perform AM-PAC 6 Click Basic Mobility/ Daily Activity assessment daily.  - Set and communicate daily mobility goal to care team and patient/family/caregiver.   - Collaborate with rehabilitation services on mobility goals if consulted  - Ambulate patient 3 times a day  - Out of bed to chair 3 times a day   - Out of bed for meals 3 times a day  - Out of bed for toileting  - Record patient progress and toleration of activity level   Outcome: Progressing     Problem: DISCHARGE PLANNING  Goal: Discharge to home or other facility with appropriate resources  Description: INTERVENTIONS:  - Identify barriers to discharge w/patient and caregiver  - Arrange for needed discharge resources and transportation as appropriate  - Identify discharge learning needs (meds, wound care, etc.)  - Refer to Case Management Department for coordinating discharge planning if the patient  needs post-hospital services based on physician/advanced practitioner order or complex needs related to functional status, cognitive ability, or social support system  Outcome: Progressing     Problem: Knowledge Deficit  Goal: Patient/family/caregiver demonstrates understanding of disease process, treatment plan, medications, and discharge instructions  Description: Complete learning assessment and assess knowledge base.  Interventions:  - Provide teaching at level of understanding  - Provide teaching via preferred learning methods  Outcome: Progressing

## 2023-12-27 NOTE — ASSESSMENT & PLAN NOTE
"Patient presents with multiple episodes of diarrhea, nausea, vomiting. Family members at home sick as well with \"flu\" symptoms  Likely viral gastroenteritis  Symptom management with IV fluids, IV Zofran as needed  N.p.o. --> full liquid diet with crackers and toast as patient reports significant improvement in nausea and vomiting.  Continue to advance as tolerated   C. difficile, bacterial enteric panel pending  "

## 2023-12-27 NOTE — ASSESSMENT & PLAN NOTE
As noted by leukocytosis, tachycardia likely due to abdominal wall abscess  Also noted to have lactic acidosis but this could be partly related to dehydration, persistent vomiting  Lactate is improving  Fluid boluses given in the ER and will place patient on maintenance IV fluids  Repeat lactate till less than 2  Follow-up pending blood cultures.  Repeat lab work in a.m.

## 2023-12-27 NOTE — TELEPHONE ENCOUNTER
Patients GI provider:  Dr. Quevedo    Number to return call: [phone]    Reason for call: Pt GI issues stemming from recent abdominal surgeries , complications of intestinal pouch     Scheduled procedure/appointment date if applicable: Appt 1/31/24

## 2023-12-27 NOTE — PROGRESS NOTES
"[facility]  Progress Note  Name: Nico Cruz I  MRN: [id number]  Unit/Bed#: [address] [place] [address] Date of Admission: 12/26/2023   Date of Service: 12/27/2023 I Hospital Day: 0    Assessment/Plan   * Sepsis (HCC)  Assessment & Plan  As noted by leukocytosis, tachycardia likely due to abdominal wall abscess  Also noted to have lactic acidosis but this is likely related to dehydration, persistent vomiting  Lactic acidosis resolved  Fluid boluses given in the ER and currently on IV fluids  Follow-up pending blood cultures.   Leukocytosis is resolved today within 24 hours and therefore leukocytosis is at least partly reactive in nature/hemoconcentrated  Repeat lab work in a.m.    Abdominal wall abscess  Assessment & Plan  Patient presented to the ER with complaints of abdominal pain  Imaging done in the ER showed fluid collection concerning for abscess in the right anterior abdominal wall  Patient received a dose of IV Levaquin in the ER.  Continue IV Zosyn  Case was discussed with surgery by ER who recommended IR consult for drain placement.    Patient underwent aspiration of abdominal wall fluid collection and no drain was placed  Follow-up pending cultures  IV Dilaudid as needed for pain management    Thrombocytosis  Assessment & Plan  Likely reactive in nature.  Noted to have thrombocytosis in the past as well  Repeat lab work in a.m.    Diarrhea  Assessment & Plan  Patient presents with multiple episodes of diarrhea, nausea, vomiting. Family members at home sick as well with \"flu\" symptoms  Likely viral gastroenteritis  Symptom management with IV fluids, IV Zofran as needed  N.p.o. --> full liquid diet with crackers and toast as patient reports significant improvement in nausea and vomiting.  Continue to advance as tolerated   C. difficile, bacterial enteric panel pending               VTE Pharmacologic Prophylaxis:    Lovenox    Mobility:   -HL Achieved: 7: Walk 25 feet or more  HLM Goal " achieved. Continue to encourage appropriate mobility.    Patient Centered Rounds: I performed bedside rounds with nursing staff today.   Discussions with Specialists or Other Care Team Provider: yes - surgery    Education and Discussions with Family / Patient: Updated  (significant other) at bedside.    Total Time Spent on Date of Encounter in care of patient: This time was spent on one or more of the following: performing physical exam; counseling and coordination of care; obtaining or reviewing history; documenting in the medical record; reviewing/ordering tests, medications or procedures; communicating with other healthcare professionals and discussing with patient's family/caregivers.    Current Length of Stay: 0 day(s)  Current Patient Status: Observation   Certification Statement: The patient will continue to require additional inpatient hospital stay due to abdominal wall abscess status post aspiration awaiting cultures  Discharge Plan: Anticipate discharge in 24-48 hrs to home.    Code Status: Level 1 - Full Code    Subjective:     Patient reports feeling much better compared to yesterday.  Reports significant improvement in nausea and vomiting but still with multiple episodes of diarrhea    Objective:     Vitals:   Temp (24hrs), Av °F (36.7 °C), Min:97.7 °F (36.5 °C), Max:98.3 °F (36.8 °C)    Temp:  [97.7 °F (36.5 °C)-98.3 °F (36.8 °C)] 98.3 °F (36.8 °C)  HR:  [78-98] 78  Resp:  [18-20] 18  BP: (108-123)/(62-76) 119/76  SpO2:  [94 %-100 %] 95 %  There is no height or weight on file to calculate BMI.     Input and Output Summary (last 24 hours):     Intake/Output Summary (Last 24 hours) at 2023 0900  Last data filed at 2023  Gross per 24 hour   Intake 2700 ml   Output --   Net 2700 ml       Physical Exam:   Physical Exam  Vitals reviewed.   Constitutional:       General: He is not in acute distress.     Appearance: He is not toxic-appearing.   HENT:      Head: Normocephalic  and atraumatic.   Eyes:      General:         Right eye: No discharge.         Left eye: No discharge.   Cardiovascular:      Rate and Rhythm: Normal rate and regular rhythm.   Pulmonary:      Effort: Pulmonary effort is normal. No respiratory distress.      Breath sounds: Normal breath sounds. No wheezing or rales.   Abdominal:      General: Bowel sounds are normal. There is no distension.      Palpations: Abdomen is soft.      Tenderness: There is no abdominal tenderness.      Comments: Prior ostomy site with dressing in place.  Some tenderness noted to lower abdomen/suprapubic area close to the prior ostomy site I   Musculoskeletal:      Right lower leg: No edema.      Left lower leg: No edema.   Neurological:      Mental Status: He is alert and oriented to person, place, and time.          Additional Data:     Labs:  Results from last 7 days   Lab Units 12/26/23  1349   WBC Thousand/uL 23.67*   HEMOGLOBIN g/dL 14.0   HEMATOCRIT % 46.2   PLATELETS Thousands/uL 275   NEUTROS PCT % 89*   LYMPHS PCT % 2*   MONOS PCT % 6   EOS PCT % 1     Results from last 7 days   Lab Units 12/26/23  1409   SODIUM mmol/L 138   POTASSIUM mmol/L 4.9   CHLORIDE mmol/L 110*   CO2 mmol/L 20*   BUN mg/dL 12   CREATININE mg/dL 0.78   ANION GAP mmol/L 8   CALCIUM mg/dL 7.3*   ALBUMIN g/dL 3.8   TOTAL BILIRUBIN mg/dL 0.87   ALK PHOS U/L 54   ALT U/L 10   AST U/L 31   GLUCOSE RANDOM mg/dL 80                 Results from last 7 days   Lab Units 12/27/23  0713 12/26/23  1956 12/26/23  1751 12/26/23  1518 12/26/23  1409   LACTIC ACID mmol/L  --  1.6 2.4* 3.1*  --    PROCALCITONIN ng/ml 0.31*  --   --   --  0.16       Lines/Drains:  Invasive Devices       Peripheral Intravenous Line  Duration             Peripheral IV 12/26/23 Dorsal (posterior);Right Hand <1 day    Peripheral IV 12/26/23 Left;Ventral (anterior) Hand <1 day    Peripheral IV 12/26/23 Right;Upper Arm <1 day                          Imaging: Reviewed radiology reports from this  admission including: abdominal/pelvic CT and procedure reports    Recent Cultures (last 7 days):   Results from last 7 days   Lab Units 12/26/23  1516   BLOOD CULTURE  Received in Microbiology Lab. Culture in Progress.  Received in Microbiology Lab. Culture in Progress.       Last 24 Hours Medication List:   Current Facility-Administered Medications   Medication Dose Route Frequency Provider Last Rate    enoxaparin  40 mg Subcutaneous Daily Darlin Martinez,       HYDROmorphone  0.2 mg Intravenous Q3H PRN Darlin Martinez, DO      HYDROmorphone  0.5 mg Intravenous Q3H PRN Darlin Martinez, DO      ondansetron  4 mg Intravenous Q6H PRN Darlin Martinez, DO      piperacillin-tazobactam  3.375 g Intravenous Q6H Darlin Martinez, DO 0 g (12/26/23 1942)    sodium chloride  125 mL/hr Intravenous Continuous Darlin Martinez,  mL/hr (12/27/23 0732)        Today, Patient Was Seen By: Darlin Martinez DO    **Please Note: This note may have been constructed using a voice recognition system.**

## 2023-12-27 NOTE — ASSESSMENT & PLAN NOTE
Patient presented to the ER with complaints of abdominal pain  Imaging done in the ER showed fluid collection concerning for abscess in the right anterior abdominal wall  Patient received a dose of IV Levaquin in the ER.  Continue IV Zosyn  Case was discussed with surgery by ER who recommended IR consult for drain placement.    Patient underwent aspiration of abdominal wall fluid collection and no drain was placed  Follow-up pending cultures  IV Dilaudid as needed for pain management

## 2023-12-27 NOTE — SEPSIS NOTE
"  Sepsis Note   Nico ANTONIO Cruz 28 y.o. male MRN: [id number]  Unit/Bed#: ED 14 Encounter: [id number]       Initial Sepsis Screening       Row Name 12/26/23 1924                Is the patient's history suggestive of a new or worsening infection? Yes (Proceed)  -EDGAR        Suspected source of infection acute abdominal infection  -EDGAR        Indicate SIRS criteria Tachycardia > 90 bpm;Leukocytosis (WBC > 68377 IJL) OR Leukopenia (WBC <4000 IJL) OR Bandemia (WBC >10% bands)  -EDGAR        Are two or more of the above signs & symptoms of infection both present and new to the patient? Yes (Proceed)  -EDGAR        Assess for evidence of organ dysfunction: Are any of the below criteria present within 6 hours of suspected infection and SIRS criteria that are NOT considered to be chronic conditions? Lactate > 2.0  -        Date of presentation of severe sepsis --        Time of presentation of severe sepsis --        Sepsis Note: Click \"NEXT\" below (NOT \"close\") to generate sepsis note based on above information. --                  User Key  (r) = Recorded By, (t) = Taken By, (c) = Cosigned By      Initials Name Provider Type    EDGAR Cartwright DO Physician                    Default Flowsheet Data (last 720 hours)       Sepsis Reassess       Row Name 12/26/23 1925                   Repeat Volume Status and Tissue Perfusion Assessment Performed    Date of Reassessment: 12/26/23  -        Time of Reassessment: 1830  -        Sepsis Reassessment Note: Click \"NEXT\" below (NOT \"close\") to generate sepsis reassessment note. --        Repeat Volume Status and Tissue Perfusion Assessment Performed --                  User Key  (r) = Recorded By, (t) = Taken By, (c) = Cosigned By      Initials Name Provider Type    EDGAR Cartwright DO Physician                    There is no height or weight on file to calculate BMI.  Wt Readings from Last 1 Encounters:   12/19/23 79.8 kg (176 lb)        Ideal body weight: 70.7 kg (155 lb 13.8 " oz)  Adjusted ideal body weight: 74.4 kg (163 lb 14.7 oz)

## 2023-12-27 NOTE — SEDATION DOCUMENTATION
Procedure completed by Dr Marquis. Pt tolerated without issues. Education provided prior to procedure. Band-aid to site. Transported back to [facility] rm on 3N, bedside report given.

## 2023-12-27 NOTE — BRIEF OP NOTE (RAD/CATH)
INTERVENTIONAL RADIOLOGY PROCEDURE NOTE    Date: 12/27/2023    Procedure:   Procedure Summary       Date:  Room / Location:     Anesthesia Start:  Anesthesia Stop:     Procedure:  Diagnosis:     Scheduled Providers:  Responsible Provider:     Anesthesia Type: Not recorded ASA Status: Not recorded            Preoperative diagnosis:   1. Generalized abdominal pain    2. Nausea    3. Vomiting    4. Abdominal wall abscess         Postoperative diagnosis: Same.    Surgeon: Porfirio Marquis MD     Assistant: None. No qualified resident was available.    Blood loss: None    Specimens: 10 cc fluid     Findings: Abdominal wall fluid collection aspiration performed. 10 cc fluid removed. Fluid was a yellow/green color with oily appearance similar to lipidol emulsion. Possible very small component of purulence. Collection aspirated to near completion and the decision to not place a drain was made.    Complications: None immediate.    Anesthesia: conscious sedation

## 2023-12-27 NOTE — ASSESSMENT & PLAN NOTE
"Patient presents with multiple episodes of diarrhea, nausea, vomiting that started today.  Family members at home sick as well with \"flu\" symptoms  Likely gastroenteritis  Symptom management with IV fluids, IV Zofran as needed  N.p.o. for today.  Will slowly advance as tolerated as symptoms improve  "

## 2023-12-27 NOTE — ASSESSMENT & PLAN NOTE
Patient presented to the ER with complaints of abdominal pain  Imaging done in the ER showed fluid collection concerning for abscess in the right anterior abdominal wall  Patient received a dose of IV Levaquin in the ER.  Will change to IV Zosyn  Case was discussed with surgery by ER who recommended IR consult for drain placement.  IR has been consulted  IV Dilaudid as needed for pain management

## 2023-12-27 NOTE — ASSESSMENT & PLAN NOTE
Likely reactive in nature.  Noted to have thrombocytosis in the past as well  Repeat lab work in a.m.

## 2023-12-27 NOTE — SEPSIS NOTE
"  Sepsis Note   Nico ANTONIO Cruz 28 y.o. male MRN: [id number]  Unit/Bed#: ED 14 Encounter: [id number]       Initial Sepsis Screening       Row Name 12/26/23 1924                Is the patient's history suggestive of a new or worsening infection? Yes (Proceed)  -EDGAR        Suspected source of infection acute abdominal infection  -EDGAR        Indicate SIRS criteria Tachycardia > 90 bpm;Leukocytosis (WBC > 08355 IJL) OR Leukopenia (WBC <4000 IJL) OR Bandemia (WBC >10% bands)  -EDGAR        Are two or more of the above signs & symptoms of infection both present and new to the patient? Yes (Proceed)  -EDGAR        Assess for evidence of organ dysfunction: Are any of the below criteria present within 6 hours of suspected infection and SIRS criteria that are NOT considered to be chronic conditions? Lactate > 2.0  -        Date of presentation of severe sepsis --        Time of presentation of severe sepsis --        Sepsis Note: Click \"NEXT\" below (NOT \"close\") to generate sepsis note based on above information. --                  User Key  (r) = Recorded By, (t) = Taken By, (c) = Cosigned By      Initials Name Provider Type    EDGAR Cartwright DO Physician                    Default Flowsheet Data (last 720 hours)       Sepsis Reassess       Row Name 12/26/23 1925                   Repeat Volume Status and Tissue Perfusion Assessment Performed    Date of Reassessment: 12/26/23  -        Time of Reassessment: 1830  -        Sepsis Reassessment Note: Click \"NEXT\" below (NOT \"close\") to generate sepsis reassessment note. --        Repeat Volume Status and Tissue Perfusion Assessment Performed --                  User Key  (r) = Recorded By, (t) = Taken By, (c) = Cosigned By      Initials Name Provider Type    EDGAR Cartwright DO Physician                    There is no height or weight on file to calculate BMI.  Wt Readings from Last 1 Encounters:   12/19/23 79.8 kg (176 lb)        Ideal body weight: 70.7 kg (155 lb 13.8 " oz)  Adjusted ideal body weight: 74.4 kg (163 lb 14.7 oz)

## 2023-12-27 NOTE — CONSULTS
Interventional Radiology  Consultation 12/27/2023     Inpatient Consult to IR  Consult performed by: Porfirio Marquis MD  Consult ordered by: Darlin Martinez DO        Nico Cruz   1995   [id number]      Assessment/Plan:  28 year old male with small anterior abdominal wall fluid collection. The patient is s/p ostomy take down in this area. Will discuss with surgery possible aspiration vs drain placement.    Medical Problems       Problem List       * (Principal) Sepsis (HCC)    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HCC)    BMI 26.0-26.9,adult    History of ulcerative colitis    Thrombocytosis    Complications of intestinal pouch (HCC)    Overview Signed 4/9/2019 10:38 PM by Smooth Dietz DO     Added automatically from request for surgery [id number]         Arthralgia    Seizure-like activity (HCC)    Ankylosing spondylitis (HCC)    Anemia    Overview Signed 6/10/2022  9:07 PM by Smooth Dietz DO     Thal minor         Presumed AV endocarditis    Abnormal CT scan of lung    Partial small bowel obstruction (HCC)    Elevated C-reactive protein (CRP)    At high risk for cardiac arrhythmia    Electrolyte abnormality    Nausea vomiting and diarrhea    Other insomnia    Situational anxiety    Left testicular pain    Colorectal anastomotic stricture    Abdominal pain, RLQ    Diarrhea    Vitamin D deficiency    Hypomagnesemia    Left inguinal hernia    Abdominal wall abscess            Subjective:     Patient ID: Nico Cruz is a 28 y.o. male.    History of Present Illness  28 year old male with small anterior abdominal wall fluid collection. The patient is s/p ostomy take down in this area. Will discuss with surgery possible aspiration vs drain placement.    Review of Systems  as above    Past Medical History:   Diagnosis Date    Ankylosing spondylitis (HCC)     Anxiety     Bowel obstruction (HCC)     Clostridium difficile colitis 9/13/2018    Colitis     Ileal pouchitis (HCC) 9/13/2018    Pancreatitis     Ulcerative  colitis (HCC)         Past Surgical History:   Procedure Laterality Date    COLECTOMY TOTAL      with ileal pouch and anastemosis    IR PICC PLACEMENT SINGLE LUMEN  3/1/2022    TOTAL COLECTOMY          Social History     Tobacco Use   Smoking Status Never   Smokeless Tobacco Never        Social History     Substance and Sexual Activity   Alcohol Use Not Currently    Comment: pt states 5 a month/socially        Social History     Substance and Sexual Activity   Drug Use No        Allergies   Allergen Reactions    Cefazolin Hives     Other reaction(s): Arrythmia (Abnormal Heartbeat), Rash Maculopapular    Mesalamine GI Intolerance     Other reaction(s): Pancreatitis  Annotation - [id number]: pancreatitis    Silver Rash     Coloplast ostomy Products    Wound Dressings Rash     Coloplast ostomy Products        Current Facility-Administered Medications   Medication Dose Route Frequency Provider Last Rate Last Admin    enoxaparin (LOVENOX) subcutaneous injection 40 mg  40 mg Subcutaneous Daily Darlin Revankar, DO   40 mg at 12/27/23 0909    HYDROmorphone (DILAUDID) injection 0.2 mg  0.2 mg Intravenous Q3H PRN Darlin Revankar, DO        HYDROmorphone (DILAUDID) injection 0.5 mg  0.5 mg Intravenous Q3H PRN Darlin Revankar, DO   0.5 mg at 12/27/23 0734    ondansetron (ZOFRAN) injection 4 mg  4 mg Intravenous Q6H PRN Darlin Revankar, DO   4 mg at 12/27/23 0000    piperacillin-tazobactam (ZOSYN) IVPB 3.375 g  3.375 g Intravenous Q6H Darlin Revankar, DO 0 mL/hr at 12/26/23 1942 3.375 g at 12/27/23 0729    sodium chloride 0.9 % infusion  125 mL/hr Intravenous Continuous Darlin Revankar,  mL/hr at 12/27/23 0732 125 mL/hr at 12/27/23 0732          Objective:    Vitals:    12/26/23 2015 12/26/23 2218 12/26/23 2219 12/27/23 0745   BP: 111/64 116/73  119/76   BP Location: Right arm      Pulse: 98   78   Resp: 20 20  18   Temp:  98.3 °F (36.8 °C)     TempSrc:       SpO2: 98%  94% 95%        Physical Exam  Not performed    No  "results found for: \"BNP\"   Lab Results   Component Value Date    WBC 23.67 (H) 12/26/2023    HGB 14.0 12/26/2023    HCT 46.2 12/26/2023    MCV 63 (L) 12/26/2023     12/26/2023     Lab Results   Component Value Date    INR 1.02 12/16/2023    INR 0.95 03/31/2023    INR 0.98 02/10/2023    PROTIME 13.6 12/16/2023    PROTIME 12.8 03/31/2023    PROTIME 13.1 02/10/2023     Lab Results   Component Value Date    PTT 29 12/16/2023         I have personally reviewed pertinent imaging and laboratory results.     Code Status: Level 1 - Full Code  Advance Directive and Living Will:      Power of :    POLST:      IR has been consulted to evaluate the patient, determine the appropriate procedure, and whether or not a procedure can and should be performed.    Thank you for allowing me to participate in the care of Nico Cruz. Please don't hesitate to call, text, email, or TigerText with any questions.     This text is generated with voice recognition software. There may be translation, syntax,  or grammatical errors. If you have any questions, please contact the dictating provider.        "

## 2023-12-27 NOTE — CONSULTS
Consultation - General Surgery   Nico ANTONIO Cruz 28 y.o. male MRN: [id number]  Unit/Bed#: [address] [place] [address] Encounter: [id number]    Assessment/Plan     Assessment:  1) Suspicion for Gastroenteritis -could be the cause of fever/chills as well as leukocytosis in addition to nausea/vomiting and significant diarrhea, patient has multiple family members with the same symptoms and seems to believe that this was likely passed through his family as some form of viral stomach bug, patient's abdominal pain is predominantly referred to the lower quadrants, patient's pain patterns may not be completely correlating to what is most typical bearing in mind the advanced nature of his surgeries, patient does have a leukocytosis that is significant as well as a lactic acid that took approximately 8 hours to clear, gastroenteritis could be a subsequent leading cause to these issues, on repeat CBC leukocytosis  normalized, with dramatic normalization with only less than 24 hours of antibiotics it is presumed that some of the leukocytosis and lactic acid may be due to hemoconcentration as patient was having profound liquid stools over the course of roughly a 48-hour time and is more prone to dehydration secondary to lacking a colon  2) Abdominal Wall Fluid Collection -suspicious for abscess although patient is not having any tenderness directly overlying fluid collection nor any erythema, it is directly underneath the site of the ileostomy reversal, ileostomy site was permitted to heal by secondary intent only being partially closed due to being a contaminated wound, patient does not have any drainage of purulence surrounding the granulated wound, patient does have an elevated leukocytosis which could be due to fluid collection as well as fevers and chills, otherwise patient has not been having any symptoms overlying this wound but is currently in the stages of healing  3) extensive history of ulcerative colitis with multiple abdominal  surgeries, subtotal colectomy, recent J-pouch formation over the last year and reversal with end-to-end anastomosis in the last 2 weeks    Plan:  1-3)   -Start on clear liquids and advance diet as tolerated after IR  -After physical exam would defer to interventional radiology for diagnostic needle aspiration of fluid and possible drain placement, did coordinate with the interventional radiology team and medicine team  -Would consider C. difficile PCR assay and stool enteric cultures based on history of C. difficile and recent antibiotic exposure  -Can start/continue IV antibiotics but if this is to be continued with start p.o. Vanco 125 as a precautionary measure to prevent C. difficile exacerbation likely this is viral in nature and foodborne illness based on description and should be self-limiting  -Not entirely convinced that abdominal wall fluid collection is infectious in etiology given the patient's rapid turnaround/improvement  - Run body fluid aspirate on IR needle aspiration in order to further delineate source of infection  -Appreciated repeat labs as leukocytosis has resolved and lactic acid has cleared  -Follow-up as an outpatient with colorectal Dr. Quevedo for considerations of restarting UC medications in light of infectious risksAnd routine UC screening/preventative medicine  -IV fluids for now for hydration as it is suspected patient is intravascularly depleted  - DVT prophylaxis  -No plans for this moment for open incision and drainage    History of Present Illness   HPI:  Nico ANTONIO Cruz is a 28 y.o. male with a past medical history pertinent for total colectomy in 2015, ulcerative colitis 2012, C. difficile colitis in 2015, ileal J-pouch with an end anastomosis and significant bleeding dilations secondary to anastomotic strictures in the past, prophylactic loop ileostomy in 2022 in preparation for revision of J-pouch in 2023, exploratory laparotomy and washout secondary to generalized peritonitis  in 2022 status post loop ileostomy with a subsequent need for resecting loop ileostomy and doing end ileostomy creation, now within the last year April 2023 multiple stents and balloon dilations of the proximal aspect where the loop ileostomy previously was causing stricture like narrowing and December 2023 having a J-pouch formation and end-to-end anastomosis.  Patient since seeing our service last is doing much better.  Since J-pouch formation and reversal of end ileostomy patient is doing much better.  Patient is in good spirits and has been having adequate stability in his weight.  Patient has not had any more progressive weight loss.  Patient is still of ulcerative colitis medications that can affect his immune system due to his history with having bacteremia and other advanced infections.  Patient since his surgery has had an partially open wound from his end ileostomy location is not showing any signs of infection.  Patient on his CT scan does have signs of a fluid collection but patient himself denies any redness or tenderness overlying that region.  Patient denies any purulent drainage as well.  Patient has not had any sort of exacerbations of pouchitis or any sort of rectal inflammation secondary to not being on UC medications.  Patient also on last endoscopic interventions was told that they were impressed by the degree of remission that his UC is not currently.  Patient did not have significant signs of inflammatory changes.  Patient is concerned because he is having significant onset over the last few days of fevers and chills with nausea and vomiting as well as profound liquid bowel movements.  Patient has had seemingly nonstop liquid stools.  Granted, patient is predisposed to the inherent risk of dehydration and liquid stools due to the nature of all of his procedures but since his procedure on December 7, 2023 he had had more formed stools than what he is currently experiencing.  Patient also with  his stools was having nausea and vomiting which is not his typical since his last surgery nor over the last 6 to 9 months.  Patient had his proximal portion prior to his J-pouch stented and ballooned up to 1.8 cm which dramatically helped a lot of his abdominal symptoms.  Patient also stated before his had fevers and chills with his liquid bowel movements and nausea and vomiting.  Patient reports that although he would like to pursue and workup the fluid collection that is in the abdominal wall underneath his ileostomy wound that he believes that all of this came as a result of a family illness that is being spread around.  Patient reports that his parents were quite ill with the same symptoms and his sister is still vomiting actively currently.  Patient does report interval improvement as he is no longer nauseated or vomiting.  Patient is still having significant volume of liquid stools.  Patient denies any black or blood in his stools.  Patient is also following up with [facility] colorectal for more routine care as his current colorectal surgeon has received a promotion and is no longer able to take care of him routinely at Health system.    Inpatient consult to Acute Care Surgery  Consult performed by: Eric Carver PA-C  Consult ordered by: Darlin Martinez DO          Review of Systems   Constitutional:  Positive for appetite change, chills and fever. Negative for fatigue.   HENT:  Negative for congestion and rhinorrhea.    Respiratory:  Negative for cough, chest tightness, shortness of breath and wheezing.    Cardiovascular:  Negative for chest pain, palpitations and leg swelling.   Gastrointestinal:  Positive for abdominal pain, diarrhea, nausea and vomiting. Negative for abdominal distention, blood in stool and constipation.   Genitourinary:  Negative for difficulty urinating, dysuria and hematuria.   Musculoskeletal:  Negative for arthralgias and gait problem.   Skin:  Positive for wound. Negative for color  change.   Neurological:  Negative for dizziness, syncope, weakness and numbness.   Psychiatric/Behavioral:  Negative for agitation and confusion.        Historical Information   Past Medical History:   Diagnosis Date    Ankylosing spondylitis (HCC)     Anxiety     Bowel obstruction (HCC)     Clostridium difficile colitis 9/13/2018    Colitis     Ileal pouchitis (HCC) 9/13/2018    Pancreatitis     Ulcerative colitis (HCC)      Past Surgical History:   Procedure Laterality Date    COLECTOMY TOTAL      with ileal pouch and anastemosis    IR PICC PLACEMENT SINGLE LUMEN  3/1/2022    TOTAL COLECTOMY       Social History   Social History     Substance and Sexual Activity   Alcohol Use Not Currently    Comment: pt states 5 a month/socially     Social History     Substance and Sexual Activity   Drug Use No     E-Cigarette/Vaping    E-Cigarette Use Never User      E-Cigarette/Vaping Substances    Nicotine No     THC No     CBD No     Flavoring No     Other No     Unknown No      Social History     Tobacco Use   Smoking Status Never   Smokeless Tobacco Never     Family History: non-contributory    Meds/Allergies   all current active meds have been reviewed and current meds:   Current Facility-Administered Medications   Medication Dose Route Frequency    enoxaparin (LOVENOX) subcutaneous injection 40 mg  40 mg Subcutaneous Daily    HYDROmorphone (DILAUDID) injection 0.2 mg  0.2 mg Intravenous Q3H PRN    HYDROmorphone (DILAUDID) injection 0.5 mg  0.5 mg Intravenous Q3H PRN    ondansetron (ZOFRAN) injection 4 mg  4 mg Intravenous Q6H PRN    piperacillin-tazobactam (ZOSYN) IVPB 3.375 g  3.375 g Intravenous Q6H    sodium chloride 0.9 % infusion  125 mL/hr Intravenous Continuous     Allergies   Allergen Reactions    Cefazolin Hives     Other reaction(s): Arrythmia (Abnormal Heartbeat), Rash Maculopapular    Mesalamine GI Intolerance     Other reaction(s): Pancreatitis  Annotation - [id number]: pancreatitis    Silver Rash      Coloplast ostomy Products    Wound Dressings Rash     Coloplast ostomy Products        Objective   First Vitals:   Blood Pressure: 123/68 (12/26/23 1334)  Pulse: 98 (12/26/23 1334)  Temperature: 97.7 °F (36.5 °C) (12/26/23 1334)  Temp Source: Oral (12/26/23 1334)  Respirations: 20 (12/26/23 1334)  SpO2: 100 % (12/26/23 1334)    Current Vitals:   Blood Pressure: 119/76 (12/27/23 0745)  Pulse: 78 (12/27/23 0745)  Temperature: 98.3 °F (36.8 °C) (12/26/23 2218)  Temp Source: Oral (12/26/23 1334)  Respirations: 18 (12/27/23 0745)  SpO2: 95 % (12/27/23 0745)      Intake/Output Summary (Last 24 hours) at 12/27/2023 1111  Last data filed at 12/26/2023 2004  Gross per 24 hour   Intake 2700 ml   Output --   Net 2700 ml       Invasive Devices       Peripheral Intravenous Line  Duration             Peripheral IV 12/26/23 Dorsal (posterior);Right Hand <1 day    Peripheral IV 12/26/23 Left;Ventral (anterior) Hand <1 day    Peripheral IV 12/26/23 Right;Upper Arm <1 day                    Physical Exam  Constitutional:       General: He is awake. He is not in acute distress.     Appearance: Normal appearance. He is normal weight. He is not ill-appearing, toxic-appearing or diaphoretic.      Interventions: He is not intubated.  HENT:      Head: Normocephalic and atraumatic.      Right Ear: Hearing and external ear normal. No decreased hearing noted.      Left Ear: Hearing, tympanic membrane and external ear normal. No decreased hearing noted.      Nose: Nose normal.   Cardiovascular:      Rate and Rhythm: Normal rate and regular rhythm.      Heart sounds: Normal heart sounds, S1 normal and S2 normal. Heart sounds not distant. No murmur heard.  Pulmonary:      Effort: Pulmonary effort is normal. No tachypnea, bradypnea, accessory muscle usage, prolonged expiration, respiratory distress or retractions. He is not intubated.      Breath sounds: Normal breath sounds. No stridor, decreased air movement or transmitted upper airway sounds.  No decreased breath sounds, wheezing, rhonchi or rales.   Abdominal:      General: Abdomen is flat. Bowel sounds are normal.      Palpations: Abdomen is soft.      Tenderness: There is generalized abdominal tenderness and tenderness in the suprapubic area. There is no guarding or rebound.       Skin:     General: Skin is warm and dry.      Findings: Wound present. No erythema.          Psychiatric:         Behavior: Behavior is cooperative.         Lab Results: I have personally reviewed pertinent lab results.  , CBC:   Lab Results   Component Value Date    WBC 8.49 12/27/2023    HGB 11.2 (L) 12/27/2023    HCT 35.7 (L) 12/27/2023    MCV 62 (L) 12/27/2023     (H) 12/27/2023    RBC 5.73 (H) 12/27/2023    MCH 19.5 (L) 12/27/2023    MCHC 31.4 12/27/2023    RDW 18.9 (H) 12/27/2023    MPV 8.9 12/27/2023    NRBC 0 12/26/2023   , CMP:   Lab Results   Component Value Date    SODIUM 138 12/26/2023    K 4.9 12/26/2023     (H) 12/26/2023    CO2 20 (L) 12/26/2023    BUN 12 12/26/2023    CREATININE 0.78 12/26/2023    CALCIUM 7.3 (L) 12/26/2023    AST 31 12/26/2023    ALT 10 12/26/2023    ALKPHOS 54 12/26/2023    EGFR 121 12/26/2023     Imaging: I have personally reviewed pertinent reports.    EKG, Pathology, and Other Studies: I have personally reviewed pertinent reports.      Counseling / Coordination of Care  Total floor / unit time spent today 45 minutes.  Greater than 50% of total time was spent with the patient and / or family counseling and / or coordination of care.  A description of the counseling / coordination of care: Developing plan, reviewing with attending, coordinating care, physical exam, history, correlating with medicine team and interventional radiology, reviewing labs, reviewing imaging, patient education.

## 2023-12-27 NOTE — UTILIZATION REVIEW
Initial Clinical Review    Observation 12/26/23 @ 1648 converted to inpatient admission 12/28/23 @ 1814 for continued care & tx for sepsis.    Admission: Date/Time/Statement:   Admission Orders (From admission, onward)       Ordered        12/28/23 1814  Inpatient Admission  Once            12/26/23 1648  Place in Observation  Once                          Orders Placed This Encounter   Procedures    Inpatient Admission     Standing Status:   Standing     Number of Occurrences:   1     Order Specific Question:   Level of Care     Answer:   Med Surg [16]     Order Specific Question:   Estimated length of stay     Answer:   More than 2 Midnights     Order Specific Question:   Certification     Answer:   I certify that inpatient services are medically necessary for this patient for a duration of greater than two midnights. See H&P and MD Progress Notes for additional information about the patient's course of treatment.     ED Arrival Information       Expected   -    Arrival   12/26/2023 12:53    Acuity   Urgent              Means of arrival   Walk-In    Escorted by   Family Member    Service   Hospitalist    Admission type   Emergency              Arrival complaint   vomiting             Chief Complaint   Patient presents with    Abdominal Pain     Pt started with lower abd pain and vomiting today. Recent abd surgery.        Initial Presentation:   28 yom to ER from home c/o vomiting, diarrhea, abd pain. Hx Ankylosing spondylitis, Cdiff, colitis, ileal pouchitis, pancreatitis, UC, bowel obstructions. Presents with diffuse abd/epigastric tenderness. Admission work-up showing fluid collection concerning for abscess in the right anterior abdominal wall on imaging, leukocytosis. Placed in observation status for sepsis 2nd abd wall abscess. Started on IVABT & IVF. Cultures pending.    12/27/23-observation:  IVABT in progress. IVF continued. Complaining of suprapubic abdominal pain which has resolved and now pain is more  towards the epigastric and both flank. Diarrhea improving. Using IV dilaudid for pain & IV Zofran for nausea. IVF maintained.     To IR for aspiration vs drain placement abd wall abscess.  Findings: Abdominal wall fluid collection aspiration performed. 10 cc fluid removed. Fluid was a yellow/green color with oily appearance similar to lipidol emulsion. Possible very small component of purulence. Collection aspirated to near completion and the decision to not place a drain was made.       Observation to IP admission 12/28/23  Diarrhea improving appetite still poor. On IV Levaquin for gastroenteritis and status post IR aspiration of abdominal wall fluid collection, cultures pending. C-diff pending. Continues to require IV Dilaudid for pain. IVF maintained.        ED Triage Vitals   Temperature Pulse Respirations Blood Pressure SpO2   12/26/23 1334 12/26/23 1334 12/26/23 1334 12/26/23 1334 12/26/23 1334   97.7 °F (36.5 °C) 98 20 123/68 100 %      Temp Source Heart Rate Source Patient Position - Orthostatic VS BP Location FiO2 (%)   12/26/23 1334 12/26/23 1334 12/26/23 1916 12/26/23 1916 --   Oral Monitor Lying Right arm       Pain Score       12/26/23 1334       8          Wt Readings from Last 1 Encounters:   12/19/23 79.8 kg (176 lb)     Additional Vital Signs:   Date/Time Temp Pulse Resp BP MAP (mmHg) SpO2 O2 Device Patient Position - Orthostatic VS   12/26/23 2219 -- -- -- -- -- 94 % None (Room air) --   12/26/23 2218 98.3 °F (36.8 °C) -- 20 116/73 -- -- -- --   12/26/23 2015 -- 98 20 111/64 80 98 % None (Room air) Lying   12/26/23 1945 -- 98 20 108/62 79 97 % None (Room air) Lying   12/26/23 1916 -- 91 18 116/68 87 96 % None (Room air) Lying   12/26/23 1334 97.7 °F (36.5 °C) 98 20 123/68 -- 100 % None (Room air) --     Pertinent Labs/Diagnostic Test Results:   CT abdomen pelvis with contrast   Final Result  (12/26 8646)      Fluid collection again noted in the right anterior abdominal wall appears less amorphous  and more formed with an enhancing wall suspicious for abscess currently measuring 3.9 x 3.8 cm on image 601/29.      Improvement in small bowel dilatation during the interval.      Likely reactive pelvic lymphadenopathy unchanged.          Results from last 7 days   Lab Units 12/28/23  0609 12/27/23  1050 12/26/23  1349   WBC Thousand/uL 8.81 8.49 23.67*   HEMOGLOBIN g/dL 10.3* 11.2* 14.0   HEMATOCRIT % 35.1* 35.7* 46.2   PLATELETS Thousands/uL 349 394* 275   NEUTROS ABS Thousands/µL  --   --  21.25*       Results from last 7 days   Lab Units 12/28/23  0609 12/27/23  1205 12/26/23  1409   SODIUM mmol/L 137 135 138   POTASSIUM mmol/L 3.8 3.5 4.9   CHLORIDE mmol/L 106 102 110*   CO2 mmol/L 26 27 20*   ANION GAP mmol/L 5 6 8   BUN mg/dL 11 10 12   CREATININE mg/dL 0.78 0.92 0.78   EGFR ml/min/1.73sq m 121 111 121   CALCIUM mg/dL 8.6 8.8 7.3*   MAGNESIUM mg/dL 2.0  --   --      Results from last 7 days   Lab Units 12/26/23  1409   AST U/L 31   ALT U/L 10   ALK PHOS U/L 54   TOTAL PROTEIN g/dL 6.3*   ALBUMIN g/dL 3.8   TOTAL BILIRUBIN mg/dL 0.87       Results from last 7 days   Lab Units 12/28/23  0609 12/27/23  1205 12/26/23  1409   GLUCOSE RANDOM mg/dL 89 91 80     Results from last 7 days   Lab Units 12/27/23  0713 12/26/23  1409   PROCALCITONIN ng/ml 0.31* 0.16     Results from last 7 days   Lab Units 12/26/23  1956 12/26/23  1751 12/26/23  1518   LACTIC ACID mmol/L 1.6 2.4* 3.1*     Results from last 7 days   Lab Units 12/26/23  1409   LIPASE u/L 9*     Results from last 7 days   Lab Units 12/27/23  1103   CLARITY UA  Clear   COLOR UA  Yellow   SPEC GRAV UA  1.020   PH UA  7.0   GLUCOSE UA mg/dl Negative   KETONES UA mg/dl Trace*   BLOOD UA  Negative   PROTEIN UA mg/dl 30 (1+)*   NITRITE UA  Negative   BILIRUBIN UA  Negative   UROBILINOGEN UA E.U./dl 0.2   LEUKOCYTES UA  Negative   WBC UA /hpf None Seen   RBC UA /hpf 0-1   BACTERIA UA /hpf Moderate*   EPITHELIAL CELLS WET PREP /hpf None Seen   MUCUS THREADS   Occasional*     Results from last 7 days   Lab Units 12/27/23  1102   C DIFF TOXIN B BY PCR  Negative     Results from last 7 days   Lab Units 12/27/23  1718   SALMONELLA SP PCR  None Detected   SHIGELLA SP/ENTEROINVASIVE E. COLI (EIEC)  None Detected   CAMPYLOBACTER SP (JEJUNI AND COLI)  None Detected   SHIGA TOXIN 1/SHIGA TOXIN 2  None Detected     Results from last 7 days   Lab Units 12/27/23  1455 12/27/23  1102 12/26/23  1516   BLOOD CULTURE   --   --  No Growth at 48 hrs.  No Growth at 48 hrs.   GRAM STAIN RESULT  2+ Polys  No bacteria seen  --   --    URINE CULTURE   --  No Growth <1000 cfu/mL  --    BODY FLUID CULTURE, STERILE  No growth  --   --        ED Treatment:   Medication Administration from 12/26/2023 1253 to 12/26/2023 2217         Date/Time Order Dose Route Action     12/26/2023 1353 EST sodium chloride 0.9 % bolus 1,000 mL 1,000 mL Intravenous New Bag     12/26/2023 1351 EST ondansetron (ZOFRAN) injection 4 mg 4 mg Intravenous Given     12/26/2023 1403 EST HYDROmorphone (DILAUDID) injection 0.5 mg 0.5 mg Intravenous Given     12/26/2023 1518 EST iohexol (OMNIPAQUE) 350 MG/ML injection (MULTI-DOSE) 100 mL 100 mL Intravenous Given     12/26/2023 1527 EST HYDROmorphone (DILAUDID) injection 0.5 mg 0.5 mg Intravenous Given     12/26/2023 1529 EST ondansetron (ZOFRAN) injection 4 mg 4 mg Intravenous Given     12/26/2023 1626 EST sodium chloride 0.9 % bolus 1,000 mL 1,000 mL Intravenous New Bag     12/26/2023 1912 EST sodium chloride 0.9 % bolus 500 mL 500 mL Intravenous New Bag     12/26/2023 1648 EST levofloxacin (LEVAQUIN) IVPB (premix in dextrose) 500 mg 100 mL 500 mg Intravenous New Bag     12/26/2023 1656 EST ondansetron (ZOFRAN) injection 4 mg 4 mg Intravenous Given     12/26/2023 1810 EST HYDROmorphone (DILAUDID) injection 0.5 mg 0.5 mg Intravenous Given     12/26/2023 1837 EST promethazine (PHENERGAN) injection 12.5 mg 12.5 mg Intravenous Given     12/26/2023 1912 EST piperacillin-tazobactam  (ZOSYN) IVPB 3.375 g 3.375 g Intravenous New Bag     12/26/2023 2000 EST sodium chloride 0.9 % infusion 125 mL/hr Intravenous New Bag     12/26/2023 2029 EST HYDROmorphone (DILAUDID) injection 0.5 mg 0.5 mg Intravenous Given          Past Medical History:   Diagnosis Date    Ankylosing spondylitis (HCC)     Anxiety     Bowel obstruction (HCC)     Clostridium difficile colitis 9/13/2018    Colitis     Ileal pouchitis (HCC) 9/13/2018    Pancreatitis     Ulcerative colitis (HCC)      Present on Admission:   Abdominal wall abscess   Diarrhea   Sepsis (HCC)      Admitting Diagnosis: Nausea [R11.0]  Vomiting [R11.10]  Generalized abdominal pain [R10.84]  Abdominal wall abscess [L02.211]  Age/Sex: 28 y.o. male  Admission Orders:  Consult IR  Scd/foot pumps    Scheduled Medications:  Medications 12/19 12/20 12/21 12/22 12/23 12/24 12/25 12/26 12/27 12/28   enoxaparin (LOVENOX) subcutaneous injection 40 mg  Dose: 40 mg  Freq: Daily Route: SC  Start: 12/27/23 0900   Admin Instructions:               0909      0916        HYDROmorphone (DILAUDID) injection 0.5 mg  Dose: 0.5 mg  Freq: Once Route: IV  Start: 12/26/23 1815 End: 12/26/23 1810   Admin Instructions:              1810          HYDROmorphone (DILAUDID) injection 0.5 mg  Dose: 0.5 mg  Freq: Once Route: IV  Start: 12/26/23 1530 End: 12/26/23 1527   Admin Instructions:              1527          HYDROmorphone (DILAUDID) injection 0.5 mg  Dose: 0.5 mg  Freq: Once Route: IV  Start: 12/26/23 1400 End: 12/26/23 1403   Admin Instructions:              1403 [C]          levofloxacin (LEVAQUIN) IVPB (premix in dextrose) 500 mg 100 mL  Dose: 500 mg  Freq: Once Route: IV  Last Dose: Stopped (12/26/23 1748)  Start: 12/26/23 1645 End: 12/26/23 1748   Admin Instructions:      Order specific questions:              8299 [id number]          ondansetron (ZOFRAN) injection 4 mg  Dose: 4 mg  Freq: Once Route: IV  Start: 12/26/23 1700 End: 12/26/23 1656   Admin Instructions:               1656          ondansetron (ZOFRAN) injection 4 mg  Dose: 4 mg  Freq: Once Route: IV  Start: 12/26/23 1530 End: 12/26/23 1529   Admin Instructions:              1529          ondansetron (ZOFRAN) injection 4 mg  Dose: 4 mg  Freq: Once Route: IV  Start: 12/26/23 1330 End: 12/26/23 1351   Admin Instructions:              1351          piperacillin-tazobactam (ZOSYN) IVPB 3.375 g  Dose: 3.375 g  Freq: Every 6 hours Route: IV  Last Dose: 3.375 g (12/28/23 0602)  Start: 12/26/23 1845   Order specific questions:              1912 1942      0201     0729     1217     1816      0045     0602     1324     1845        promethazine (PHENERGAN) injection 12.5 mg  Dose: 12.5 mg  Freq: Once Route: IV  Start: 12/26/23 1830 End: 12/26/23 1837   Admin Instructions:              1837          sodium chloride 0.9 % bolus 1,000 mL  Dose: 1,000 mL  Freq: Once Route: IV  Last Dose: Stopped (12/26/23 1726)  Start: 12/26/23 1615 End: 12/26/23 1726           1626     1726          sodium chloride 0.9 % bolus 1,000 mL  Dose: 1,000 mL  Freq: Once Route: IV  Last Dose: Stopped (12/26/23 1638)  Start: 12/26/23 1330 End: 12/26/23 1638           1353     1638          sodium chloride 0.9 % bolus 500 mL  Dose: 500 mL  Freq: Once Route: IV  Last Dose: Stopped (12/26/23 2004)  Start: 12/26/23 1645 End: 12/26/23 2004 1912 2004          Legend:       [id number]/1912/2012/2112/2212/2312/2412/2512/2612/2712/28        Continuous Meds Sorted by Name  for Nico Cruz as of 12/28/23 5938  Legend:       Medications 12/19 12/20 12/21 12/22 12/23 12/24 12/25 12/26 12/27 12/28   sodium chloride 0.9 % infusion  Rate: 125 mL/hr Dose: 125 mL/hr  Freq: Continuous Route: IV  Last Dose: 125 mL/hr (12/28/23 0420)  Start: 12/26/23 1930 2000      0732     1816      0420                    PRN Meds Sorted by Name  for Nico Cruz as of 12/28/23 1451  Legend:         Medications 12/19 12/20 12/21 12/22 12/23 12/24 12/25 12/26 12/27  12/28   fentanyl citrate (PF) 100 MCG/2ML  Freq: As needed Route: IV  Start: 12/27/23 1437 End: 12/27/23 1437 1437         HYDROmorphone (DILAUDID) injection 0.2 mg  Dose: 0.2 mg  Freq: Every 3 hours PRN Route: IV  PRN Reason: moderate pain  Start: 12/26/23 1928   Admin Instructions:                   HYDROmorphone (DILAUDID) injection 0.5 mg  Dose: 0.5 mg  Freq: Every 3 hours PRN Route: IV  PRN Reason: severe pain  Start: 12/26/23 1928   Admin Instructions:              2029     2359      0322     0734     1130     1514     1816     2118      0045     0601     0915     1324        iohexol (OMNIPAQUE) 350 MG/ML injection (MULTI-DOSE) 100 mL  Dose: 100 mL  Freq: Once in imaging Route: IV  PRN Reason: contrast  Start: 12/26/23 1516 End: 12/26/23 1518           1518          midazolam (VERSED) injection  Freq: As needed  Start: 12/27/23 1437 End: 12/27/23 1437 1437         ondansetron (ZOFRAN) injection 4 mg  Dose: 4 mg  Freq: Every 6 hours PRN Route: IV  PRN Reasons: nausea,vomiting  Start: 12/26/23 1928   Admin Instructions:               0000     1129     2149             Network Utilization Review Department  ATTENTION: Please call with any questions or concerns to [phone] and carefully listen to the prompts so that you are directed to the right person. All voicemails are confidential.   For Discharge needs, contact Care Management DC Support Team at [phone] opt. 2  Send all requests for admission clinical reviews, approved or denied determinations and any other requests to dedicated fax number below belonging to the campus where the patient is receiving treatment. List of dedicated fax numbers for the Facilities:  FACILITY NAME UR FAX NUMBER   ADMISSION DENIALS (Administrative/Medical Necessity) [phone]   DISCHARGE SUPPORT TEAM (NETWORK) [phone]   PARENT CHILD HEALTH (Maternity/NICU/Pediatrics) [phone]   [facility] [phone]   [facility]  [facility] [phone]   [facility] [phone]   [facility] [phone]   [facility] [phone]   [facility] [phone]   [facility] [phone]   [facility] [phone]   [facility] [phone]   [facility] [phone]   [facility] [phone]

## 2023-12-28 LAB
ANION GAP SERPL CALCULATED.3IONS-SCNC: 5 MMOL/L
BACTERIA UR CULT: NORMAL
BUN SERPL-MCNC: 11 MG/DL (ref 5–25)
C DIFF TOX GENS STL QL NAA+PROBE: NEGATIVE
CALCIUM SERPL-MCNC: 8.6 MG/DL (ref 8.4–10.2)
CAMPYLOBACTER DNA SPEC NAA+PROBE: NORMAL
CHLORIDE SERPL-SCNC: 106 MMOL/L (ref 96–108)
CO2 SERPL-SCNC: 26 MMOL/L (ref 21–32)
CREAT SERPL-MCNC: 0.78 MG/DL (ref 0.6–1.3)
ERYTHROCYTE [DISTWIDTH] IN BLOOD BY AUTOMATED COUNT: 17.8 % (ref 11.6–15.1)
GFR SERPL CREATININE-BSD FRML MDRD: 121 ML/MIN/1.73SQ M
GLUCOSE SERPL-MCNC: 89 MG/DL (ref 65–140)
HCT VFR BLD AUTO: 35.1 % (ref 36.5–49.3)
HGB BLD-MCNC: 10.3 G/DL (ref 12–17)
MAGNESIUM SERPL-MCNC: 2 MG/DL (ref 1.9–2.7)
MCH RBC QN AUTO: 18.8 PG (ref 26.8–34.3)
MCHC RBC AUTO-ENTMCNC: 29.3 G/DL (ref 31.4–37.4)
MCV RBC AUTO: 64 FL (ref 82–98)
PLATELET # BLD AUTO: 349 THOUSANDS/UL (ref 149–390)
PMV BLD AUTO: 8.7 FL (ref 8.9–12.7)
POTASSIUM SERPL-SCNC: 3.8 MMOL/L (ref 3.5–5.3)
RBC # BLD AUTO: 5.48 MILLION/UL (ref 3.88–5.62)
SALMONELLA DNA SPEC QL NAA+PROBE: NORMAL
SHIGA TOXIN STX GENE SPEC NAA+PROBE: NORMAL
SHIGELLA DNA SPEC QL NAA+PROBE: NORMAL
SODIUM SERPL-SCNC: 137 MMOL/L (ref 135–147)
WBC # BLD AUTO: 8.81 THOUSAND/UL (ref 4.31–10.16)

## 2023-12-28 PROCEDURE — 83735 ASSAY OF MAGNESIUM: CPT | Performed by: FAMILY MEDICINE

## 2023-12-28 PROCEDURE — 99232 SBSQ HOSP IP/OBS MODERATE 35: CPT | Performed by: FAMILY MEDICINE

## 2023-12-28 PROCEDURE — 99232 SBSQ HOSP IP/OBS MODERATE 35: CPT | Performed by: SPECIALIST

## 2023-12-28 PROCEDURE — 80048 BASIC METABOLIC PNL TOTAL CA: CPT | Performed by: FAMILY MEDICINE

## 2023-12-28 PROCEDURE — 85027 COMPLETE CBC AUTOMATED: CPT | Performed by: FAMILY MEDICINE

## 2023-12-28 RX ADMIN — HYDROMORPHONE HYDROCHLORIDE 0.5 MG: 1 INJECTION, SOLUTION INTRAMUSCULAR; INTRAVENOUS; SUBCUTANEOUS at 00:45

## 2023-12-28 RX ADMIN — HYDROMORPHONE HYDROCHLORIDE 0.5 MG: 1 INJECTION, SOLUTION INTRAMUSCULAR; INTRAVENOUS; SUBCUTANEOUS at 20:55

## 2023-12-28 RX ADMIN — HYDROMORPHONE HYDROCHLORIDE 0.5 MG: 1 INJECTION, SOLUTION INTRAMUSCULAR; INTRAVENOUS; SUBCUTANEOUS at 13:24

## 2023-12-28 RX ADMIN — Medication 3.38 G: at 06:02

## 2023-12-28 RX ADMIN — HYDROMORPHONE HYDROCHLORIDE 0.5 MG: 1 INJECTION, SOLUTION INTRAMUSCULAR; INTRAVENOUS; SUBCUTANEOUS at 16:54

## 2023-12-28 RX ADMIN — SODIUM CHLORIDE 125 ML/HR: 0.9 INJECTION, SOLUTION INTRAVENOUS at 16:55

## 2023-12-28 RX ADMIN — ENOXAPARIN SODIUM 40 MG: 40 INJECTION SUBCUTANEOUS at 09:16

## 2023-12-28 RX ADMIN — HYDROMORPHONE HYDROCHLORIDE 0.5 MG: 1 INJECTION, SOLUTION INTRAMUSCULAR; INTRAVENOUS; SUBCUTANEOUS at 09:15

## 2023-12-28 RX ADMIN — Medication 3.38 G: at 00:45

## 2023-12-28 RX ADMIN — ONDANSETRON 4 MG: 2 INJECTION INTRAMUSCULAR; INTRAVENOUS at 20:57

## 2023-12-28 RX ADMIN — Medication 3.38 G: at 18:38

## 2023-12-28 RX ADMIN — SODIUM CHLORIDE 125 ML/HR: 0.9 INJECTION, SOLUTION INTRAVENOUS at 04:20

## 2023-12-28 RX ADMIN — HYDROMORPHONE HYDROCHLORIDE 0.5 MG: 1 INJECTION, SOLUTION INTRAMUSCULAR; INTRAVENOUS; SUBCUTANEOUS at 06:01

## 2023-12-28 RX ADMIN — Medication 3.38 G: at 13:24

## 2023-12-28 NOTE — PROGRESS NOTES
"[facility]  Progress Note  Name: Nico Cruz I  MRN: [id number]  Unit/Bed#: [address] [place] [address] I Date of Admission: 12/26/2023   Date of Service: 12/28/2023 I Hospital Day: 0    Assessment/Plan   * Sepsis (HCC)  Assessment & Plan  As noted by leukocytosis, tachycardia likely due to abdominal wall abscess  Also noted to have lactic acidosis but this is likely related to dehydration, persistent vomiting  Lactic acidosis resolved  Fluid boluses given in the ER and currently on IV fluids  blood cultures neg so far  Leukocytosis resolved within 24 hours and therefore leukocytosis is at least partly reactive in nature/hemoconcentrated    Abdominal wall abscess  Assessment & Plan  Patient presented to the ER with complaints of abdominal pain  Imaging done in the ER showed fluid collection concerning for abscess in the right anterior abdominal wall  Patient received a dose of IV Levaquin in the ER.  Continue IV Zosyn  Case was discussed with surgery by ER who recommended IR consult for drain placement.    Patient underwent aspiration of abdominal wall fluid collection and no drain was placed  Follow-up pending cultures of aspirate  IV Dilaudid as needed for pain management    Thrombocytosis  Assessment & Plan  Likely reactive in nature.  Noted to have thrombocytosis in the past as well  Resolved on labwork today    Diarrhea  Assessment & Plan  Patient presents with multiple episodes of diarrhea, nausea, vomiting. Family members at home sick as well with \"flu\" symptoms  Likely viral gastroenteritis  Symptom management with IV fluids, IV Zofran as needed  N.p.o. --> full liquid diet with crackers and toast. Continue to advance as tolerated   C. difficile, bacterial enteric panel pending               VTE Pharmacologic Prophylaxis:    lovenox    Mobility:   Basic Mobility Inpatient Raw Score: 24  JH-HLM Goal: 8: Walk 250 feet or more  JH-HLM Achieved: 8: Walk 250 feet ot more  HLM Goal achieved. " Continue to encourage appropriate mobility.    Patient Centered Rounds: I performed bedside rounds with nursing staff today.   Discussions with Specialists or Other Care Team Provider: yes     Education and Discussions with Family / Patient: yes    Total Time Spent on Date of Encounter in care of patient: This time was spent on one or more of the following: performing physical exam; counseling and coordination of care; obtaining or reviewing history; documenting in the medical record; reviewing/ordering tests, medications or procedures; communicating with other healthcare professionals and discussing with patient's family/caregivers.    Current Length of Stay: 0 day(s)  Current Patient Status: Inpatient   Certification Statement: The patient will continue to require additional inpatient hospital stay due to diarrhea, abd wall abscess awaiting cultures  Discharge Plan: Anticipate discharge in 24-48 hrs to home.    Code Status: Level 1 - Full Code    Subjective:     Patient reports intermittent nausea. Still with multiple episodes of diarrhea. Reports decreased po intake    Objective:     Vitals:   Temp (24hrs), Av.3 °F (36.8 °C), Min:97.9 °F (36.6 °C), Max:98.5 °F (36.9 °C)    Temp:  [97.9 °F (36.6 °C)-98.5 °F (36.9 °C)] 97.9 °F (36.6 °C)  HR:  [70-84] 70  Resp:  [17-20] 18  BP: (110-128)/(60-78) 128/77  SpO2:  [96 %-100 %] 96 %  There is no height or weight on file to calculate BMI.     Input and Output Summary (last 24 hours):     Intake/Output Summary (Last 24 hours) at 2023 0932  Last data filed at 2023 0609  Gross per 24 hour   Intake --   Output 13 ml   Net -13 ml       Physical Exam:   Physical Exam  Vitals reviewed.   Constitutional:       General: He is not in acute distress.     Appearance: He is not toxic-appearing.   HENT:      Head: Normocephalic and atraumatic.   Eyes:      General:         Right eye: No discharge.         Left eye: No discharge.   Cardiovascular:      Rate and Rhythm:  Normal rate and regular rhythm.   Pulmonary:      Effort: Pulmonary effort is normal. No respiratory distress.      Breath sounds: Normal breath sounds. No wheezing or rales.   Abdominal:      General: Bowel sounds are normal. There is no distension.      Palpations: Abdomen is soft.      Tenderness: There is no guarding.      Comments: Prior ostomy site with dressing in place with tenderness next to this site   Neurological:      Mental Status: He is alert and oriented to person, place, and time.          Additional Data:     Labs:  Results from last 7 days   Lab Units 12/28/23  0609 12/27/23  1050 12/26/23  1349   WBC Thousand/uL 8.81   < > 23.67*   HEMOGLOBIN g/dL 10.3*   < > 14.0   HEMATOCRIT % 35.1*   < > 46.2   PLATELETS Thousands/uL 349   < > 275   NEUTROS PCT %  --   --  89*   LYMPHS PCT %  --   --  2*   MONOS PCT %  --   --  6   EOS PCT %  --   --  1    < > = values in this interval not displayed.     Results from last 7 days   Lab Units 12/28/23  0609 12/27/23  1205 12/26/23  1409   SODIUM mmol/L 137   < > 138   POTASSIUM mmol/L 3.8   < > 4.9   CHLORIDE mmol/L 106   < > 110*   CO2 mmol/L 26   < > 20*   BUN mg/dL 11   < > 12   CREATININE mg/dL 0.78   < > 0.78   ANION GAP mmol/L 5   < > 8   CALCIUM mg/dL 8.6   < > 7.3*   ALBUMIN g/dL  --   --  3.8   TOTAL BILIRUBIN mg/dL  --   --  0.87   ALK PHOS U/L  --   --  54   ALT U/L  --   --  10   AST U/L  --   --  31   GLUCOSE RANDOM mg/dL 89   < > 80    < > = values in this interval not displayed.                 Results from last 7 days   Lab Units 12/27/23  0713 12/26/23  1956 12/26/23  1751 12/26/23  1518 12/26/23  1409   LACTIC ACID mmol/L  --  1.6 2.4* 3.1*  --    PROCALCITONIN ng/ml 0.31*  --   --   --  0.16       Lines/Drains:  Invasive Devices       Peripheral Intravenous Line  Duration             Peripheral IV 12/26/23 Left;Ventral (anterior) Hand 1 day                          Imaging: No pertinent imaging reviewed.    Recent Cultures (last 7 days):    Results from last 7 days   Lab Units 12/27/23  1455 12/27/23  1102 12/26/23  1516   BLOOD CULTURE   --   --  No Growth at 24 hrs.  No Growth at 24 hrs.   GRAM STAIN RESULT  2+ Polys  No bacteria seen  --   --    URINE CULTURE   --  No Growth <1000 cfu/mL  --        Last 24 Hours Medication List:   Current Facility-Administered Medications   Medication Dose Route Frequency Provider Last Rate    enoxaparin  40 mg Subcutaneous Daily Darlin Martinez DO      HYDROmorphone  0.2 mg Intravenous Q3H PRN Darlin Martinez, DO      HYDROmorphone  0.5 mg Intravenous Q3H PRN Darlin Martinez, DO      ondansetron  4 mg Intravenous Q6H PRN Darlin Martinez,       piperacillin-tazobactam  3.375 g Intravenous Q6H Darlin Martinez DO 3.375 g (12/28/23 0602)    sodium chloride  125 mL/hr Intravenous Continuous Darlin Martinez,  mL/hr (12/28/23 0420)        Today, Patient Was Seen By: Darlin Martinez DO    **Please Note: This note may have been constructed using a voice recognition system.**

## 2023-12-28 NOTE — PLAN OF CARE
Problem: PAIN - ADULT  Goal: Verbalizes/displays adequate comfort level or baseline comfort level  Description: Interventions:  - Encourage patient to monitor pain and request assistance  - Assess pain using appropriate pain scale  - Administer analgesics based on type and severity of pain and evaluate response  - Implement non-pharmacological measures as appropriate and evaluate response  - Consider cultural and social influences on pain and pain management  - Notify physician/advanced practitioner if interventions unsuccessful or patient reports new pain  Outcome: Progressing     Problem: INFECTION - ADULT  Goal: Absence or prevention of progression during hospitalization  Description: INTERVENTIONS:  - Assess and monitor for signs and symptoms of infection  - Monitor lab/diagnostic results  - Monitor all insertion sites, i.e. indwelling lines, tubes, and drains  - Monitor endotracheal if appropriate and nasal secretions for changes in amount and color  - [place] appropriate cooling/warming therapies per order  - Administer medications as ordered  - Instruct and encourage patient and family to use good hand hygiene technique  - Identify and instruct in appropriate isolation precautions for identified infection/condition  Outcome: Progressing  Goal: Absence of fever/infection during neutropenic period  Description: INTERVENTIONS:  - Monitor WBC    Outcome: Progressing     Problem: SAFETY ADULT  Goal: Patient will remain free of falls  Description: INTERVENTIONS:  - Educate patient/family on patient safety including physical limitations  - Instruct patient to call for assistance with activity   - Consult OT/PT to assist with strengthening/mobility   - Keep Call bell within reach  - Keep bed low and locked with side rails adjusted as appropriate  - Keep care items and personal belongings within reach  - Initiate and maintain comfort rounds  - Make Fall Risk Sign visible to staff  - Offer Toileting every 2 Hours,  in advance of need  - Apply yellow socks and bracelet for high fall risk patients  - Consider moving patient to room near nurses station  Outcome: Progressing  Goal: Maintain or return to baseline ADL function  Description: INTERVENTIONS:  -  Assess patient's ability to carry out ADLs; assess patient's baseline for ADL function and identify physical deficits which impact ability to perform ADLs (bathing, care of mouth/teeth, toileting, grooming, dressing, etc.)  - Assess/evaluate cause of self-care deficits   - Assess range of motion  - Assess patient's mobility; develop plan if impaired  - Assess patient's need for assistive devices and provide as appropriate  - Encourage maximum independence but intervene and supervise when necessary  - Involve family in performance of ADLs  - Assess for home care needs following discharge   - Consider OT consult to assist with ADL evaluation and planning for discharge  - Provide patient education as appropriate  Outcome: Progressing  Goal: Maintains/Returns to pre admission functional level  Description: INTERVENTIONS:  - Perform AM-PAC 6 Click Basic Mobility/ Daily Activity assessment daily.  - Set and communicate daily mobility goal to care team and patient/family/caregiver.   - Collaborate with rehabilitation services on mobility goals if consulted  - Ambulate patient 3 times a day  - Out of bed to chair 3 times a day   - Out of bed for meals 3 times a day  - Out of bed for toileting  - Record patient progress and toleration of activity level   Outcome: Progressing     Problem: DISCHARGE PLANNING  Goal: Discharge to home or other facility with appropriate resources  Description: INTERVENTIONS:  - Identify barriers to discharge w/patient and caregiver  - Arrange for needed discharge resources and transportation as appropriate  - Identify discharge learning needs (meds, wound care, etc.)  - Refer to Case Management Department for coordinating discharge planning if the patient  needs post-hospital services based on physician/advanced practitioner order or complex needs related to functional status, cognitive ability, or social support system  Outcome: Progressing     Problem: Knowledge Deficit  Goal: Patient/family/caregiver demonstrates understanding of disease process, treatment plan, medications, and discharge instructions  Description: Complete learning assessment and assess knowledge base.  Interventions:  - Provide teaching at level of understanding  - Provide teaching via preferred learning methods  Outcome: Progressing

## 2023-12-28 NOTE — PROGRESS NOTES
"Progress Note - General Surgery   Nico ANTONIO Cruz 28 y.o. male MRN: [id number]  Unit/Bed#: [address] [place] [address] Encounter: [id number]    Assessment:  S/p IR drainage of abdominal wall fluid collection.     Plan:  Advance diet as tolerated.   Continue local wound care to ostomy closure sight.   C. Diff pending.   Abdominal wall fluid cultures pending.   Follow up colorectal as out patient in regards to UC medications.  No surgical intervention.     Subjective/Objective   Chief Complaint:\" I am feeling better.\"    Subjective: Patient was seen and examined bedside. Patient reports no acute changes through the night. Abdominal pain much improved. Denies nausea, vomiting.     Objective:     Blood pressure 128/77, pulse 70, temperature 97.9 °F (36.6 °C), temperature source Oral, resp. rate 18, SpO2 96%.,There is no height or weight on file to calculate BMI.      Intake/Output Summary (Last 24 hours) at 12/28/2023 0949  Last data filed at 12/28/2023 0609  Gross per 24 hour   Intake --   Output 13 ml   Net -13 ml       Invasive Devices       Peripheral Intravenous Line  Duration             Peripheral IV 12/26/23 Left;Ventral (anterior) Hand 1 day                    Physical Exam: /77 (BP Location: Left arm)   Pulse 70   Temp 97.9 °F (36.6 °C) (Oral)   Resp 18   SpO2 96%   General appearance: alert and oriented, in no acute distress  Head: Normocephalic, without obvious abnormality, atraumatic  Lungs:  normal effort, no respiratory distress.   Heart:  regular rate.   Abdomen:  soft, non-distended, + BS, ostomy closure site with good granulation tissue, aspiration site c/d/I.     Lab, Imaging and other studies:I have personally reviewed pertinent lab results.  , CBC:   Lab Results   Component Value Date    WBC 8.81 12/28/2023    HGB 10.3 (L) 12/28/2023    HCT 35.1 (L) 12/28/2023    MCV 64 (L) 12/28/2023     12/28/2023    RBC 5.48 12/28/2023    MCH 18.8 (L) 12/28/2023    MCHC 29.3 (L) 12/28/2023    RDW 17.8 (H) " 12/28/2023    MPV 8.7 (L) 12/28/2023   , CMP:   Lab Results   Component Value Date    SODIUM 137 12/28/2023    K 3.8 12/28/2023     12/28/2023    CO2 26 12/28/2023    BUN 11 12/28/2023    CREATININE 0.78 12/28/2023    CALCIUM 8.6 12/28/2023    EGFR 121 12/28/2023     VTE Pharmacologic Prophylaxis: Enoxaparin (Lovenox)  VTE Mechanical Prophylaxis: sequential compression device

## 2023-12-28 NOTE — CASE MANAGEMENT
Case Management Assessment & Discharge Planning Note    Patient name Nico Cruz  Location [address] [address]/[address] [place] [address]-* MRN [id number]  : 1995 Date 2023       Current Admission Date: 2023  Current Admission Diagnosis:Sepsis (HCC)   Patient Active Problem List    Diagnosis Date Noted    Thrombocytosis 2023    Abdominal wall abscess 2023    Diarrhea 2023    Vitamin D deficiency 2023    Hypomagnesemia 2023    Left inguinal hernia 2023    Abdominal pain, RLQ 2023    Colorectal anastomotic stricture 10/06/2023    Left testicular pain 2023    Situational anxiety 2023    Other insomnia 2023    Sepsis (HCC) 2023    Electrolyte abnormality 2023    Nausea vomiting and diarrhea 2023    At high risk for cardiac arrhythmia 10/19/2022    Elevated C-reactive protein (CRP) 2022    Partial small bowel obstruction (HCC) 2022    Abnormal CT scan of lung 2022    Presumed AV endocarditis 2022    Anemia 2022    Ankylosing spondylitis (HCC) 2022    Seizure-like activity ([facility]) 2021    Arthralgia 09/15/2020    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    History of ulcerative colitis 2019    BMI 26.0-26.9,adult 10/19/2018    Ileal pouchitis (HCC) 2018      LOS (days): 0  Geometric Mean LOS (GMLOS) (days):   Days to GMLOS:     OBJECTIVE:       Current admission status: Observation    Preferred Pharmacy:   [facility] [address] [address]  Phone: [phone] Fax: [phone]    [id number] - [place], [place] - [address]  [address]  [facility] [address]  Phone: [phone] Fax: [phone]    Primary Care Provider: Smooth Dietz DO    Primary Insurance: BLUE CROSS  Secondary Insurance:     ASSESSMENT:  Active Health Care Proxies       Anthony,  Greg Health Care Representative - Father   Primary Phone: [phone] (Mobile)  Home Phone: [phone]                 Readmission Root Cause  30 Day Readmission: Yes  Who directed you to return to the hospital?: Self  Did you understand whom to contact if you had questions or problems?: Yes  Did you get your prescriptions before you left the hospital?: Yes  Were you able to get your prescriptions filled when you left the hospital?: Yes  Did you take your medications as prescribed?: Yes  Were you able to get to your follow-up appointments?: Yes  Patient was readmitted due to: Nausea and vomiting    Patient Information  Admitted from:: Home  Mental Status: Alert  During Assessment patient was accompanied by: Not accompanied during assessment  Assessment information provided by:: Patient  Primary Caregiver: Self  Support Systems: Self, Parent, Family members  County of Residence: [place]  What city do you live in?: [place]  Living Arrangements: Lives w/ Extended Family    Activities of Daily Living Prior to Admission  Functional Status: Independent  Completes ADLs independently?: Yes  Ambulates independently?: Yes  Does patient use assisted devices?: No  Does patient currently own DME?: No  Does patient have a history of Outpatient Therapy (PT/OT)?: No  Does the patient have a history of Short-Term Rehab?: No  Does patient have a history of HHC?: No  Does patient currently have HHC?: No    Patient Information Continued  Income Source: Employed  Does patient have prescription coverage?: Yes    Means of Transportation  Means of Transport to Appts:: Drives Self      Housing Stability: Low Risk  (10/11/2023)    Received from [facility]    Housing Stability     Are you worried or concerned that in the next two months you may not have stable housing that you own, rent, or stay in as a part of a household?: No     Think about the place you live. Do you have problems with any of the following? (check all that  apply): None of the above   Food Insecurity: Low Risk  (10/11/2023)    Received from [facility]    Food Insecurity     Within the past 12 months, the food you bought just didn’t last and you didn’t have money to get more.: Never true     Within the past 12 months, you worried that your food would run out before you got money to buy more.: Never true   Transportation Needs: No Transportation Needs (2/13/2023)    PRAPARE - Transportation     Lack of Transportation (Medical): No     Lack of Transportation (Non-Medical): No   Utilities: Not on file       DISCHARGE DETAILS:    Discharge planning discussed with:: Patient  Freedom of Choice: Yes     CM contacted family/caregiver?: Yes    Contacts  Patient Contacts: Greg Cruz  Relationship to Patient:: Family  Contact Method: Phone  Phone Number: [phone]  Reason/Outcome: Emergency Contact

## 2023-12-29 LAB — BACTERIA SPEC ANAEROBE CULT: NORMAL

## 2023-12-29 PROCEDURE — 99232 SBSQ HOSP IP/OBS MODERATE 35: CPT | Performed by: PHYSICIAN ASSISTANT

## 2023-12-29 PROCEDURE — 99232 SBSQ HOSP IP/OBS MODERATE 35: CPT | Performed by: FAMILY MEDICINE

## 2023-12-29 RX ORDER — DIPHENOXYLATE HYDROCHLORIDE AND ATROPINE SULFATE 2.5; .025 MG/1; MG/1
1 TABLET ORAL 4 TIMES DAILY PRN
Status: DISCONTINUED | OUTPATIENT
Start: 2023-12-29 | End: 2023-12-31 | Stop reason: HOSPADM

## 2023-12-29 RX ORDER — METRONIDAZOLE 500 MG/100ML
500 INJECTION, SOLUTION INTRAVENOUS EVERY 8 HOURS
Status: DISCONTINUED | OUTPATIENT
Start: 2023-12-29 | End: 2023-12-30

## 2023-12-29 RX ORDER — SACCHAROMYCES BOULARDII 250 MG
250 CAPSULE ORAL 2 TIMES DAILY
Status: DISCONTINUED | OUTPATIENT
Start: 2023-12-29 | End: 2023-12-31 | Stop reason: HOSPADM

## 2023-12-29 RX ORDER — CEFEPIME HYDROCHLORIDE 2 G/50ML
2000 INJECTION, SOLUTION INTRAVENOUS EVERY 8 HOURS
Status: DISCONTINUED | OUTPATIENT
Start: 2023-12-29 | End: 2023-12-30

## 2023-12-29 RX ADMIN — CEFEPIME HYDROCHLORIDE 2000 MG: 2 INJECTION, SOLUTION INTRAVENOUS at 18:10

## 2023-12-29 RX ADMIN — HYDROMORPHONE HYDROCHLORIDE 0.5 MG: 1 INJECTION, SOLUTION INTRAMUSCULAR; INTRAVENOUS; SUBCUTANEOUS at 10:19

## 2023-12-29 RX ADMIN — ENOXAPARIN SODIUM 40 MG: 40 INJECTION SUBCUTANEOUS at 09:04

## 2023-12-29 RX ADMIN — Medication 3.38 G: at 06:35

## 2023-12-29 RX ADMIN — HYDROMORPHONE HYDROCHLORIDE 0.5 MG: 1 INJECTION, SOLUTION INTRAMUSCULAR; INTRAVENOUS; SUBCUTANEOUS at 21:46

## 2023-12-29 RX ADMIN — Medication 3.38 G: at 13:42

## 2023-12-29 RX ADMIN — METRONIDAZOLE 500 MG: 500 INJECTION, SOLUTION INTRAVENOUS at 18:11

## 2023-12-29 RX ADMIN — SODIUM CHLORIDE 125 ML/HR: 0.9 INJECTION, SOLUTION INTRAVENOUS at 00:18

## 2023-12-29 RX ADMIN — Medication 250 MG: at 18:11

## 2023-12-29 RX ADMIN — Medication 3.38 G: at 00:15

## 2023-12-29 RX ADMIN — HYDROMORPHONE HYDROCHLORIDE 0.5 MG: 1 INJECTION, SOLUTION INTRAMUSCULAR; INTRAVENOUS; SUBCUTANEOUS at 00:14

## 2023-12-29 RX ADMIN — SODIUM CHLORIDE 125 ML/HR: 0.9 INJECTION, SOLUTION INTRAVENOUS at 10:24

## 2023-12-29 RX ADMIN — ONDANSETRON 4 MG: 2 INJECTION INTRAMUSCULAR; INTRAVENOUS at 10:19

## 2023-12-29 RX ADMIN — HYDROMORPHONE HYDROCHLORIDE 0.5 MG: 1 INJECTION, SOLUTION INTRAMUSCULAR; INTRAVENOUS; SUBCUTANEOUS at 13:42

## 2023-12-29 RX ADMIN — SODIUM CHLORIDE 125 ML/HR: 0.9 INJECTION, SOLUTION INTRAVENOUS at 21:49

## 2023-12-29 RX ADMIN — HYDROMORPHONE HYDROCHLORIDE 0.5 MG: 1 INJECTION, SOLUTION INTRAMUSCULAR; INTRAVENOUS; SUBCUTANEOUS at 03:34

## 2023-12-29 RX ADMIN — HYDROMORPHONE HYDROCHLORIDE 0.5 MG: 1 INJECTION, SOLUTION INTRAMUSCULAR; INTRAVENOUS; SUBCUTANEOUS at 18:11

## 2023-12-29 RX ADMIN — DIPHENOXYLATE HYDROCHLORIDE AND ATROPINE SULFATE 1 TABLET: .025; 2.5 TABLET ORAL at 14:11

## 2023-12-29 RX ADMIN — HYDROMORPHONE HYDROCHLORIDE 0.5 MG: 1 INJECTION, SOLUTION INTRAMUSCULAR; INTRAVENOUS; SUBCUTANEOUS at 06:37

## 2023-12-29 NOTE — ASSESSMENT & PLAN NOTE
As noted by leukocytosis, tachycardia likely due to abdominal wall abscess  Also noted to have lactic acidosis but this is likely related to dehydration, persistent vomiting  Lactic acidosis resolved.  Fluid boluses given in the ER and currently on IV fluids  blood cultures neg so far  Leukocytosis resolved within 24 hours and therefore leukocytosis is at least partly reactive in nature/hemoconcentrated

## 2023-12-29 NOTE — PLAN OF CARE
Problem: PAIN - ADULT  Goal: Verbalizes/displays adequate comfort level or baseline comfort level  Description: Interventions:  - Encourage patient to monitor pain and request assistance  - Assess pain using appropriate pain scale  - Administer analgesics based on type and severity of pain and evaluate response  - Implement non-pharmacological measures as appropriate and evaluate response  - Consider cultural and social influences on pain and pain management  - Notify physician/advanced practitioner if interventions unsuccessful or patient reports new pain  Outcome: Progressing     Problem: INFECTION - ADULT  Goal: Absence or prevention of progression during hospitalization  Description: INTERVENTIONS:  - Assess and monitor for signs and symptoms of infection  - Monitor lab/diagnostic results  - Monitor all insertion sites, i.e. indwelling lines, tubes, and drains  - Monitor endotracheal if appropriate and nasal secretions for changes in amount and color  - [place] appropriate cooling/warming therapies per order  - Administer medications as ordered  - Instruct and encourage patient and family to use good hand hygiene technique  - Identify and instruct in appropriate isolation precautions for identified infection/condition  Outcome: Progressing     Problem: SAFETY ADULT  Goal: Patient will remain free of falls  Description: INTERVENTIONS:  - Educate patient/family on patient safety including physical limitations  - Instruct patient to call for assistance with activity   - Consult OT/PT to assist with strengthening/mobility   - Keep Call bell within reach  - Keep bed low and locked with side rails adjusted as appropriate  - Keep care items and personal belongings within reach  - Initiate and maintain comfort rounds  - Make Fall Risk Sign visible to staff  - Offer Toileting every 2 Hours, in advance of need  - Apply yellow socks and bracelet for high fall risk patients  - Consider moving patient to room near nurses  station  Outcome: Progressing  Goal: Maintain or return to baseline ADL function  Description: INTERVENTIONS:  -  Assess patient's ability to carry out ADLs; assess patient's baseline for ADL function and identify physical deficits which impact ability to perform ADLs (bathing, care of mouth/teeth, toileting, grooming, dressing, etc.)  - Assess/evaluate cause of self-care deficits   - Assess range of motion  - Assess patient's mobility; develop plan if impaired  - Assess patient's need for assistive devices and provide as appropriate  - Encourage maximum independence but intervene and supervise when necessary  - Involve family in performance of ADLs  - Assess for home care needs following discharge   - Consider OT consult to assist with ADL evaluation and planning for discharge  - Provide patient education as appropriate  Outcome: Progressing  Goal: Maintains/Returns to pre admission functional level  Description: INTERVENTIONS:  - Perform AM-PAC 6 Click Basic Mobility/ Daily Activity assessment daily.  - Set and communicate daily mobility goal to care team and patient/family/caregiver.   - Collaborate with rehabilitation services on mobility goals if consulted  - Ambulate patient 3 times a day  - Out of bed to chair 3 times a day   - Out of bed for meals 3 times a day  - Out of bed for toileting  - Record patient progress and toleration of activity level   Outcome: Progressing     Problem: DISCHARGE PLANNING  Goal: Discharge to home or other facility with appropriate resources  Description: INTERVENTIONS:  - Identify barriers to discharge w/patient and caregiver  - Arrange for needed discharge resources and transportation as appropriate  - Identify discharge learning needs (meds, wound care, etc.)  - Refer to Case Management Department for coordinating discharge planning if the patient needs post-hospital services based on physician/advanced practitioner order or complex needs related to functional status,  cognitive ability, or social support system  Outcome: Progressing     Problem: Knowledge Deficit  Goal: Patient/family/caregiver demonstrates understanding of disease process, treatment plan, medications, and discharge instructions  Description: Complete learning assessment and assess knowledge base.  Interventions:  - Provide teaching at level of understanding  - Provide teaching via preferred learning methods  Outcome: Progressing

## 2023-12-29 NOTE — ASSESSMENT & PLAN NOTE
"Patient presents with multiple episodes of diarrhea, nausea, vomiting. Family members at home sick as well with \"flu\" symptoms  Likely viral gastroenteritis  Symptom management with IV fluids, IV Zofran as needed  N.p.o. --> full liquid diet with crackers and toast. Continue to advance as tolerated   C. difficile, bacterial enteric panel pending  "

## 2023-12-29 NOTE — ASSESSMENT & PLAN NOTE
As noted by leukocytosis, tachycardia likely due to abdominal wall abscess  Also noted to have lactic acidosis but this is likely related to dehydration, persistent vomiting  Lactic acidosis resolved  Fluid boluses given in the ER and currently on IV fluids  blood cultures neg so far  Leukocytosis resolved within 24 hours and therefore leukocytosis is at least partly reactive in nature/hemoconcentrated

## 2023-12-29 NOTE — ASSESSMENT & PLAN NOTE
"Patient presents with multiple episodes of diarrhea, nausea, vomiting. Family members at home sick as well with \"flu\" symptoms  Likely viral gastroenteritis  Symptom management with IV fluids, IV Zofran as needed  N.p.o. --> full liquid diet with crackers and toast. Continue to advance as tolerated   C. difficile, bacterial enteric panel negative  Discussed with surgery given his abdominal surgeries and started on Lomotil as needed for diarrhea  "

## 2023-12-29 NOTE — PROGRESS NOTES
"[facility]  Progress Note  Name: Nico Cruz I  MRN: [id number]  Unit/Bed#: [address] [place] [address] Date of Admission: 12/26/2023   Date of Service: 12/29/2023 I Hospital Day: 1    Assessment/Plan   * Sepsis (HCC)  Assessment & Plan  As noted by leukocytosis, tachycardia likely due to abdominal wall abscess  Also noted to have lactic acidosis but this is likely related to dehydration, persistent vomiting  Lactic acidosis resolved.  Fluid boluses given in the ER and currently on IV fluids  blood cultures neg so far  Leukocytosis resolved within 24 hours and therefore leukocytosis is at least partly reactive in nature/hemoconcentrated    Abdominal wall abscess  Assessment & Plan  Patient presented to the ER with complaints of abdominal pain  Imaging done in the ER showed fluid collection concerning for abscess in the right anterior abdominal wall  Patient received a dose of IV Levaquin in the ER.   Discontinue IV Zosyn and changed to cefepime and Flagyl as patient with multiple episodes of diarrhea which in part likely due to Abx use  Case was discussed with surgery by ER who recommended IR consult for drain placement.    Patient underwent aspiration of abdominal wall fluid collection and no drain was placed  No anaerobes isolated so far.  Aspirate culture pending  IV Dilaudid as needed for pain management    Thrombocytosis  Assessment & Plan  Likely reactive in nature.  Noted to have thrombocytosis in the past as well  Resolved on labwork    Diarrhea  Assessment & Plan  Patient presents with multiple episodes of diarrhea, nausea, vomiting. Family members at home sick as well with \"flu\" symptoms  Likely viral gastroenteritis  Symptom management with IV fluids, IV Zofran as needed  N.p.o. --> full liquid diet with crackers and toast. Continue to advance as tolerated   C. difficile, bacterial enteric panel negative  Discussed with surgery given his abdominal surgeries and started on Lomotil as " needed for diarrhea               VTE Pharmacologic Prophylaxis:    Lovenox    Mobility:   Basic Mobility Inpatient Raw Score: 24  JH-HLM Goal: 8: Walk 250 feet or more  JH-HLM Achieved: 8: Walk 250 feet ot more  HLM Goal achieved. Continue to encourage appropriate mobility.    Patient Centered Rounds: I performed bedside rounds with nursing staff today.   Discussions with Specialists or Other Care Team Provider: yes - surgery    Education and Discussions with Family / Patient: Updated  (significant other) at bedside.    Total Time Spent on Date of Encounter in care of patient: This time was spent on one or more of the following: performing physical exam; counseling and coordination of care; obtaining or reviewing history; documenting in the medical record; reviewing/ordering tests, medications or procedures; communicating with other healthcare professionals and discussing with patient's family/caregivers.    Current Length of Stay: 1 day(s)  Current Patient Status: Inpatient   Certification Statement: The patient will continue to require additional inpatient hospital stay due to gastroenteritis still with multiple episodes of diarrhea  Discharge Plan: Anticipate discharge in 48-72 hrs to home.    Code Status: Level 1 - Full Code    Subjective:     Patient still with multiple episodes of diarrhea.  Still with intermittent nausea and decreased p.o. intake    Objective:     Vitals:   Temp (24hrs), Av.3 °F (36.8 °C), Min:98 °F (36.7 °C), Max:98.7 °F (37.1 °C)    Temp:  [98 °F (36.7 °C)-98.7 °F (37.1 °C)] 98.1 °F (36.7 °C)  HR:  [64-76] 76  Resp:  [17-18] 17  BP: (106-133)/(62-83) 106/62  SpO2:  [96 %-99 %] 99 %  Body mass index is 25.99 kg/m².     Input and Output Summary (last 24 hours):   No intake or output data in the 24 hours ending 23 1644    Physical Exam:   Physical Exam  Vitals reviewed.   Constitutional:       General: He is not in acute distress.     Appearance: He is not  toxic-appearing.   HENT:      Head: Normocephalic and atraumatic.   Eyes:      General:         Right eye: No discharge.         Left eye: No discharge.   Cardiovascular:      Rate and Rhythm: Normal rate and regular rhythm.   Pulmonary:      Effort: Pulmonary effort is normal. No respiratory distress.      Breath sounds: Normal breath sounds. No wheezing or rales.   Abdominal:      General: Bowel sounds are normal. There is no distension.      Palpations: Abdomen is soft.      Comments: Some mild lower abdominal tenderness noted but no guarding.  Prior ostomy site with dressing in place   Musculoskeletal:      Right lower leg: No edema.      Left lower leg: No edema.   Neurological:      Mental Status: He is alert and oriented to person, place, and time.          Additional Data:     Labs:  Results from last 7 days   Lab Units 12/28/23  0609 12/27/23  1050 12/26/23  1349   WBC Thousand/uL 8.81   < > 23.67*   HEMOGLOBIN g/dL 10.3*   < > 14.0   HEMATOCRIT % 35.1*   < > 46.2   PLATELETS Thousands/uL 349   < > 275   NEUTROS PCT %  --   --  89*   LYMPHS PCT %  --   --  2*   MONOS PCT %  --   --  6   EOS PCT %  --   --  1    < > = values in this interval not displayed.     Results from last 7 days   Lab Units 12/28/23  0609 12/27/23  1205 12/26/23  1409   SODIUM mmol/L 137   < > 138   POTASSIUM mmol/L 3.8   < > 4.9   CHLORIDE mmol/L 106   < > 110*   CO2 mmol/L 26   < > 20*   BUN mg/dL 11   < > 12   CREATININE mg/dL 0.78   < > 0.78   ANION GAP mmol/L 5   < > 8   CALCIUM mg/dL 8.6   < > 7.3*   ALBUMIN g/dL  --   --  3.8   TOTAL BILIRUBIN mg/dL  --   --  0.87   ALK PHOS U/L  --   --  54   ALT U/L  --   --  10   AST U/L  --   --  31   GLUCOSE RANDOM mg/dL 89   < > 80    < > = values in this interval not displayed.                 Results from last 7 days   Lab Units 12/27/23  0713 12/26/23  1956 12/26/23  1751 12/26/23  1518 12/26/23  1409   LACTIC ACID mmol/L  --  1.6 2.4* 3.1*  --    PROCALCITONIN ng/ml 0.31*  --   --   --   0.16       Lines/Drains:  Invasive Devices       Peripheral Intravenous Line  Duration             Peripheral IV 12/26/23 Left;Ventral (anterior) Hand 2 days                          Imaging: No pertinent imaging reviewed.    Recent Cultures (last 7 days):   Results from last 7 days   Lab Units 12/27/23  1455 12/27/23  1102 12/26/23  1516   BLOOD CULTURE   --   --  No Growth at 48 hrs.  No Growth at 48 hrs.   GRAM STAIN RESULT  2+ Polys  No bacteria seen  --   --    URINE CULTURE   --  No Growth <1000 cfu/mL  --    BODY FLUID CULTURE, STERILE  Culture results to follow.  --   --    C DIFF TOXIN B BY PCR   --  Negative  --        Last 24 Hours Medication List:   Current Facility-Administered Medications   Medication Dose Route Frequency Provider Last Rate    cefepime  2,000 mg Intravenous Q8H Darlin Martinez,       diphenoxylate-atropine  1 tablet Oral 4x Daily PRN Darlin Martinez, DO      enoxaparin  40 mg Subcutaneous Daily Darlin Martinez, DO      HYDROmorphone  0.2 mg Intravenous Q3H PRN Darlin Martinez, DO      HYDROmorphone  0.5 mg Intravenous Q3H PRN Darlin Martinez, DO      metroNIDAZOLE  500 mg Intravenous Q8H Darlin Mratinez, DO      ondansetron  4 mg Intravenous Q6H PRN Darlin Wallacear, DO      saccharomyces boulardii  250 mg Oral BID Darlin Martinez, DO      sodium chloride  125 mL/hr Intravenous Continuous Darlin Martinez,  mL/hr (12/29/23 1024)        Today, Patient Was Seen By: Darlin Martinez DO    **Please Note: This note may have been constructed using a voice recognition system.**

## 2023-12-29 NOTE — PLAN OF CARE
Problem: PAIN - ADULT  Goal: Verbalizes/displays adequate comfort level or baseline comfort level  Description: Interventions:  - Encourage patient to monitor pain and request assistance  - Assess pain using appropriate pain scale  - Administer analgesics based on type and severity of pain and evaluate response  - Implement non-pharmacological measures as appropriate and evaluate response  - Consider cultural and social influences on pain and pain management  - Notify physician/advanced practitioner if interventions unsuccessful or patient reports new pain  Outcome: Progressing     Problem: INFECTION - ADULT  Goal: Absence or prevention of progression during hospitalization  Description: INTERVENTIONS:  - Assess and monitor for signs and symptoms of infection  - Monitor lab/diagnostic results  - Monitor all insertion sites, i.e. indwelling lines, tubes, and drains  - Monitor endotracheal if appropriate and nasal secretions for changes in amount and color  - [place] appropriate cooling/warming therapies per order  - Administer medications as ordered  - Instruct and encourage patient and family to use good hand hygiene technique  - Identify and instruct in appropriate isolation precautions for identified infection/condition  Outcome: Progressing  Goal: Absence of fever/infection during neutropenic period  Description: INTERVENTIONS:  - Monitor WBC    Outcome: Progressing     Problem: SAFETY ADULT  Goal: Patient will remain free of falls  Description: INTERVENTIONS:  - Educate patient/family on patient safety including physical limitations  - Instruct patient to call for assistance with activity   - Consult OT/PT to assist with strengthening/mobility   - Keep Call bell within reach  - Keep bed low and locked with side rails adjusted as appropriate  - Keep care items and personal belongings within reach  - Initiate and maintain comfort rounds  - Make Fall Risk Sign visible to staff  - Offer Toileting every 2 Hours,  in advance of need  - Apply yellow socks and bracelet for high fall risk patients  - Consider moving patient to room near nurses station  Outcome: Progressing  Goal: Maintain or return to baseline ADL function  Description: INTERVENTIONS:  -  Assess patient's ability to carry out ADLs; assess patient's baseline for ADL function and identify physical deficits which impact ability to perform ADLs (bathing, care of mouth/teeth, toileting, grooming, dressing, etc.)  - Assess/evaluate cause of self-care deficits   - Assess range of motion  - Assess patient's mobility; develop plan if impaired  - Assess patient's need for assistive devices and provide as appropriate  - Encourage maximum independence but intervene and supervise when necessary  - Involve family in performance of ADLs  - Assess for home care needs following discharge   - Consider OT consult to assist with ADL evaluation and planning for discharge  - Provide patient education as appropriate  Outcome: Progressing  Goal: Maintains/Returns to pre admission functional level  Description: INTERVENTIONS:  - Perform AM-PAC 6 Click Basic Mobility/ Daily Activity assessment daily.  - Set and communicate daily mobility goal to care team and patient/family/caregiver.   - Collaborate with rehabilitation services on mobility goals if consulted  - Ambulate patient 3 times a day  - Out of bed to chair 3 times a day   - Out of bed for meals 3 times a day  - Out of bed for toileting  - Record patient progress and toleration of activity level   Outcome: Progressing     Problem: DISCHARGE PLANNING  Goal: Discharge to home or other facility with appropriate resources  Description: INTERVENTIONS:  - Identify barriers to discharge w/patient and caregiver  - Arrange for needed discharge resources and transportation as appropriate  - Identify discharge learning needs (meds, wound care, etc.)  - Refer to Case Management Department for coordinating discharge planning if the patient  needs post-hospital services based on physician/advanced practitioner order or complex needs related to functional status, cognitive ability, or social support system  Outcome: Progressing     Problem: Knowledge Deficit  Goal: Patient/family/caregiver demonstrates understanding of disease process, treatment plan, medications, and discharge instructions  Description: Complete learning assessment and assess knowledge base.  Interventions:  - Provide teaching at level of understanding  - Provide teaching via preferred learning methods  Outcome: Progressing

## 2023-12-29 NOTE — PLAN OF CARE
Problem: PAIN - ADULT  Goal: Verbalizes/displays adequate comfort level or baseline comfort level  Description: Interventions:  - Encourage patient to monitor pain and request assistance  - Assess pain using appropriate pain scale  - Administer analgesics based on type and severity of pain and evaluate response  - Implement non-pharmacological measures as appropriate and evaluate response  - Consider cultural and social influences on pain and pain management  - Notify physician/advanced practitioner if interventions unsuccessful or patient reports new pain  Outcome: Progressing     Problem: INFECTION - ADULT  Goal: Absence or prevention of progression during hospitalization  Description: INTERVENTIONS:  - Assess and monitor for signs and symptoms of infection  - Monitor lab/diagnostic results  - Monitor all insertion sites, i.e. indwelling lines, tubes, and drains  - Monitor endotracheal if appropriate and nasal secretions for changes in amount and color  - [place] appropriate cooling/warming therapies per order  - Administer medications as ordered  - Instruct and encourage patient and family to use good hand hygiene technique  - Identify and instruct in appropriate isolation precautions for identified infection/condition  Outcome: Progressing  Goal: Absence of fever/infection during neutropenic period  Description: INTERVENTIONS:  - Monitor WBC    Outcome: Progressing     Problem: SAFETY ADULT  Goal: Patient will remain free of falls  Description: INTERVENTIONS:  - Educate patient/family on patient safety including physical limitations  - Instruct patient to call for assistance with activity   - Consult OT/PT to assist with strengthening/mobility   - Keep Call bell within reach  - Keep bed low and locked with side rails adjusted as appropriate  - Keep care items and personal belongings within reach  - Initiate and maintain comfort rounds  - Make Fall Risk Sign visible to staff  - Offer Toileting every 2 Hours,  in advance of need  - Apply yellow socks and bracelet for high fall risk patients  - Consider moving patient to room near nurses station  Outcome: Progressing  Goal: Maintain or return to baseline ADL function  Description: INTERVENTIONS:  -  Assess patient's ability to carry out ADLs; assess patient's baseline for ADL function and identify physical deficits which impact ability to perform ADLs (bathing, care of mouth/teeth, toileting, grooming, dressing, etc.)  - Assess/evaluate cause of self-care deficits   - Assess range of motion  - Assess patient's mobility; develop plan if impaired  - Assess patient's need for assistive devices and provide as appropriate  - Encourage maximum independence but intervene and supervise when necessary  - Involve family in performance of ADLs  - Assess for home care needs following discharge   - Consider OT consult to assist with ADL evaluation and planning for discharge  - Provide patient education as appropriate  Outcome: Progressing  Goal: Maintains/Returns to pre admission functional level  Description: INTERVENTIONS:  - Perform AM-PAC 6 Click Basic Mobility/ Daily Activity assessment daily.  - Set and communicate daily mobility goal to care team and patient/family/caregiver.   - Collaborate with rehabilitation services on mobility goals if consulted  - Ambulate patient 3 times a day  - Out of bed to chair 3 times a day   - Out of bed for meals 3 times a day  - Out of bed for toileting  - Record patient progress and toleration of activity level   Outcome: Progressing     Problem: DISCHARGE PLANNING  Goal: Discharge to home or other facility with appropriate resources  Description: INTERVENTIONS:  - Identify barriers to discharge w/patient and caregiver  - Arrange for needed discharge resources and transportation as appropriate  - Identify discharge learning needs (meds, wound care, etc.)  - Refer to Case Management Department for coordinating discharge planning if the patient  needs post-hospital services based on physician/advanced practitioner order or complex needs related to functional status, cognitive ability, or social support system  Outcome: Progressing     Problem: Knowledge Deficit  Goal: Patient/family/caregiver demonstrates understanding of disease process, treatment plan, medications, and discharge instructions  Description: Complete learning assessment and assess knowledge base.  Interventions:  - Provide teaching at level of understanding  - Provide teaching via preferred learning methods  Outcome: Progressing

## 2023-12-29 NOTE — PROGRESS NOTES
Progress Note - General Surgery   Nico ANTONIO Cruz 28 y.o. male MRN: [id number]  Unit/Bed#: [address] [place] [address] Encounter: [id number]    Assessment:  1) gastroenteritis -viral, family history of all similar symptoms, still having persistent liquid loose bowel movements with intermittent nausea, no leukocytosis  2) abdominal wall fluid collection -no growth on body fluid aspiration, no redness or drainage, not suspicious of abscess as of now    Plan:  1-2)   -Start small dose Lomotil  -Add second as needed antiemetic with Phenergan  -As needed zinc oxide ointment on perianal region with every bowel movement  -Advance diet as tolerated  -Cultures reviewed    Subjective/Objective   Chief Complaint: I am not doing as well as yesterday because of still having a lot of liquid loose bowel movements    Subjective: Patient was seen and examined at bedside.  Patient denies reports worsening nausea at time where Zofran is not helping.  Patient reports that he gets the feeling of hypersalivation and diaphoretic to the point where he sweats through layers.  Patient denies fevers and chills though.  Patient reports it is more of the sensation of feeling like he is going to vomit but he does not vomit.  Patient has kind of a diffuse generalized abdominal discomfort that comes and goes but is present with or without palpation.  Patient reports that he started having the onset of his liquid loose bowel movements again that with the excessive nature has started to cause discomfort and pain even with wiping around the perianal region.  He does not feel himself and is somewhat discouraged.    Objective:     Blood pressure 124/70, pulse 72, temperature 98.7 °F (37.1 °C), temperature source Oral, resp. rate 18, SpO2 99%.,There is no height or weight on file to calculate BMI.    No intake or output data in the 24 hours ending 12/29/23 1143    Invasive Devices       Peripheral Intravenous Line  Duration             Peripheral IV 12/26/23  "Left;Ventral (anterior) Hand 2 days                    Physical Exam: /70 (BP Location: Right arm)   Pulse 72   Temp 98.7 °F (37.1 °C) (Oral)   Resp 18   SpO2 99%   General appearance: alert and oriented, in no acute distress  Head: Normocephalic, without obvious abnormality, atraumatic  Abdomen:  Soft, nondistended, diffuse discomfort with palpation,  Rectal:  Perianal and gluteal region's tenderness with palpation and erythema along the epidermis  Skin:  Striae on the medial thighs    Lab, Imaging and other studies:I have personally reviewed pertinent lab results.  , CBC: No results found for: \"WBC\", \"HGB\", \"HCT\", \"MCV\", \"PLT\", \"ADJUSTEDWBC\", \"RBC\", \"MCH\", \"MCHC\", \"RDW\", \"MPV\", \"NRBC\", CMP: No results found for: \"SODIUM\", \"K\", \"CL\", \"CO2\", \"ANIONGAP\", \"BUN\", \"CREATININE\", \"GLUCOSE\", \"CALCIUM\", \"AST\", \"ALT\", \"ALKPHOS\", \"PROT\", \"BILITOT\", \"EGFR\"  VTE Pharmacologic Prophylaxis: Enoxaparin (Lovenox)  VTE Mechanical Prophylaxis: sequential compression device  "

## 2023-12-29 NOTE — ASSESSMENT & PLAN NOTE
Patient presented to the ER with complaints of abdominal pain  Imaging done in the ER showed fluid collection concerning for abscess in the right anterior abdominal wall  Patient received a dose of IV Levaquin in the ER.  Continue IV Zosyn  Case was discussed with surgery by ER who recommended IR consult for drain placement.    Patient underwent aspiration of abdominal wall fluid collection and no drain was placed  Follow-up pending cultures of aspirate  IV Dilaudid as needed for pain management

## 2023-12-29 NOTE — ASSESSMENT & PLAN NOTE
Patient presented to the ER with complaints of abdominal pain  Imaging done in the ER showed fluid collection concerning for abscess in the right anterior abdominal wall  Patient received a dose of IV Levaquin in the ER.   Discontinue IV Zosyn and changed to cefepime and Flagyl as patient with multiple episodes of diarrhea which in part likely due to Abx use  Case was discussed with surgery by ER who recommended IR consult for drain placement.    Patient underwent aspiration of abdominal wall fluid collection and no drain was placed  No anaerobes isolated so far.  Aspirate culture pending  IV Dilaudid as needed for pain management

## 2023-12-29 NOTE — UTILIZATION REVIEW
Initial Clinical Review    Observation 12/26/23 @ 1648 converted to inpatient admission 12/28/23 @ 1814 for continued care & tx for sepsis.    Admission: Date/Time/Statement:   Admission Orders (From admission, onward)       Ordered        12/28/23 1814  Inpatient Admission  Once            12/26/23 1648  Place in Observation  Once                          Orders Placed This Encounter   Procedures    Inpatient Admission     Standing Status:   Standing     Number of Occurrences:   1     Order Specific Question:   Level of Care     Answer:   Med Surg [16]     Order Specific Question:   Estimated length of stay     Answer:   More than 2 Midnights     Order Specific Question:   Certification     Answer:   I certify that inpatient services are medically necessary for this patient for a duration of greater than two midnights. See H&P and MD Progress Notes for additional information about the patient's course of treatment.     ED Arrival Information       Expected   -    Arrival   12/26/2023 12:53    Acuity   Urgent              Means of arrival   Walk-In    Escorted by   Family Member    Service   Hospitalist    Admission type   Emergency              Arrival complaint   vomiting             Chief Complaint   Patient presents with    Abdominal Pain     Pt started with lower abd pain and vomiting today. Recent abd surgery.        Initial Presentation:   28 yom to ER from home c/o vomiting, diarrhea, abd pain. Hx Ankylosing spondylitis, Cdiff, colitis, ileal pouchitis, pancreatitis, UC, bowel obstructions. Presents with diffuse abd/epigastric tenderness. Admission work-up showing fluid collection concerning for abscess in the right anterior abdominal wall on imaging, leukocytosis. Placed in observation status for sepsis 2nd abd wall abscess. Started on IVABT & IVF. Cultures pending.    12/27/23-observation:  IVABT in progress. IVF continued. Complaining of suprapubic abdominal pain which has resolved and now pain is more  towards the epigastric and both flank. Diarrhea improving. Using IV dilaudid for pain & IV Zofran for nausea. IVF maintained.     To IR for aspiration vs drain placement abd wall abscess.  Findings: Abdominal wall fluid collection aspiration performed. 10 cc fluid removed. Fluid was a yellow/green color with oily appearance similar to lipidol emulsion. Possible very small component of purulence. Collection aspirated to near completion and the decision to not place a drain was made.       Observation to IP admission 12/28/23  Diarrhea improving appetite still poor. On IV Levaquin for gastroenteritis and status post IR aspiration of abdominal wall fluid collection, cultures pending. C-diff pending. Continues to require IV Dilaudid for pain. IVF maintained.      Date: 12/29/23   Day 3: Has surpassed a 2nd midnight with active treatments and services, which include  Pt with multiple episodes of diarrhea, intermittent nausea. Decreased PO intake, He continues on IVF's. And IV Zosyn q6,   Lomotil x 1 , dilaudid prn x 5 today so far. Zofran iv x 1           ED Triage Vitals   Temperature Pulse Respirations Blood Pressure SpO2   12/26/23 1334 12/26/23 1334 12/26/23 1334 12/26/23 1334 12/26/23 1334   97.7 °F (36.5 °C) 98 20 123/68 100 %      Temp Source Heart Rate Source Patient Position - Orthostatic VS BP Location FiO2 (%)   12/26/23 1334 12/26/23 1334 12/26/23 1916 12/26/23 1916 --   Oral Monitor Lying Right arm       Pain Score       12/26/23 1334       8          Wt Readings from Last 1 Encounters:   12/19/23 79.8 kg (176 lb)     Additional Vital Signs:   Date/Time Temp Pulse Resp BP MAP (mmHg) SpO2 O2 Device Patient Position - Orthostatic VS   12/29/23 1019 98.7 °F (37.1 °C) 72 18 124/70 -- 99 % None (Room air) Sitting   12/28/23 2256 98 °F (36.7 °C) 64 17 113/71 85 98 % None (Room air) Lying   12/28/23 2027 98.4 °F (36.9 °C) 71 18 133/83 103 96 % None (Room air) Lying   12/28/23 1531 98 °F (36.7 °C) 71 18 118/69 88 96  % None (Room air) Lying   12/28/23 0913 97.9 °F (36.6 °C) 70 18 128/77 97 96 % None (Room air) Lying   12/27/23 2201 98.4 °F (36.9 °C) 78 17 120/78 98 98 % None (Room air) Lying   12/27/23 1500 98.5 °F (36.9 °C) 76 18 117/76 81 97 % None (Room air) Lying   12/27/23 14:53:42 -- 73 18 117/70 -- 99 % -- --   12/27/23 14:49:10 -- 79 20 110/64 -- 100 % -- --   12/27/23 14:44:17 -- 77 18 112/63 -- 100 % -- --   12/27/23 14:39:04 -- 84 20 110/60 -- 100 % -- --   12/27/23 14:34:15 -- 75 20 112/68 -- 98 % -- --   12/27/23 0745 -- 78 18 119/76 -- 95 % None (Room air) --         Date/Time Temp Pulse Resp BP MAP (mmHg) SpO2 O2 Device Patient Position - Orthostatic VS   12/26/23 2219 -- -- -- -- -- 94 % None (Room air) --   12/26/23 2218 98.3 °F (36.8 °C) -- 20 116/73 -- -- -- --   12/26/23 2015 -- 98 20 111/64 80 98 % None (Room air) Lying   12/26/23 1945 -- 98 20 108/62 79 97 % None (Room air) Lying   12/26/23 1916 -- 91 18 116/68 87 96 % None (Room air) Lying   12/26/23 1334 97.7 °F (36.5 °C) 98 20 123/68 -- 100 % None (Room air) --     Pertinent Labs/Diagnostic Test Results:   CT abdomen pelvis with contrast   Final Result  (12/26 1553)      Fluid collection again noted in the right anterior abdominal wall appears less amorphous and more formed with an enhancing wall suspicious for abscess currently measuring 3.9 x 3.8 cm on image 601/29.      Improvement in small bowel dilatation during the interval.      Likely reactive pelvic lymphadenopathy unchanged.          Results from last 7 days   Lab Units 12/28/23  0609 12/27/23  1050 12/26/23  1349   WBC Thousand/uL 8.81 8.49 23.67*   HEMOGLOBIN g/dL 10.3* 11.2* 14.0   HEMATOCRIT % 35.1* 35.7* 46.2   PLATELETS Thousands/uL 349 394* 275   NEUTROS ABS Thousands/µL  --   --  21.25*       Results from last 7 days   Lab Units 12/28/23  0609 12/27/23  1205 12/26/23  1409   SODIUM mmol/L 137 135 138   POTASSIUM mmol/L 3.8 3.5 4.9   CHLORIDE mmol/L 106 102 110*   CO2 mmol/L 26 27 20*    ANION GAP mmol/L 5 6 8   BUN mg/dL 11 10 12   CREATININE mg/dL 0.78 0.92 0.78   EGFR ml/min/1.73sq m 121 111 121   CALCIUM mg/dL 8.6 8.8 7.3*   MAGNESIUM mg/dL 2.0  --   --      Results from last 7 days   Lab Units 12/26/23  1409   AST U/L 31   ALT U/L 10   ALK PHOS U/L 54   TOTAL PROTEIN g/dL 6.3*   ALBUMIN g/dL 3.8   TOTAL BILIRUBIN mg/dL 0.87       Results from last 7 days   Lab Units 12/28/23  0609 12/27/23  1205 12/26/23  1409   GLUCOSE RANDOM mg/dL 89 91 80     Results from last 7 days   Lab Units 12/27/23  0713 12/26/23  1409   PROCALCITONIN ng/ml 0.31* 0.16     Results from last 7 days   Lab Units 12/26/23  1956 12/26/23  1751 12/26/23  1518   LACTIC ACID mmol/L 1.6 2.4* 3.1*     Results from last 7 days   Lab Units 12/26/23  1409   LIPASE u/L 9*     Results from last 7 days   Lab Units 12/27/23  1103   CLARITY UA  Clear   COLOR UA  Yellow   SPEC GRAV UA  1.020   PH UA  7.0   GLUCOSE UA mg/dl Negative   KETONES UA mg/dl Trace*   BLOOD UA  Negative   PROTEIN UA mg/dl 30 (1+)*   NITRITE UA  Negative   BILIRUBIN UA  Negative   UROBILINOGEN UA E.U./dl 0.2   LEUKOCYTES UA  Negative   WBC UA /hpf None Seen   RBC UA /hpf 0-1   BACTERIA UA /hpf Moderate*   EPITHELIAL CELLS WET PREP /hpf None Seen   MUCUS THREADS  Occasional*     Results from last 7 days   Lab Units 12/27/23  1102   C DIFF TOXIN B BY PCR  Negative     Results from last 7 days   Lab Units 12/27/23  1718   SALMONELLA SP PCR  None Detected   SHIGELLA SP/ENTEROINVASIVE E. COLI (EIEC)  None Detected   CAMPYLOBACTER SP (JEJUNI AND COLI)  None Detected   SHIGA TOXIN 1/SHIGA TOXIN 2  None Detected     Results from last 7 days   Lab Units 12/27/23  1455 12/27/23  1102 12/26/23  1516   BLOOD CULTURE   --   --  No Growth at 48 hrs.  No Growth at 48 hrs.   GRAM STAIN RESULT  2+ Polys  No bacteria seen  --   --    URINE CULTURE   --  No Growth <1000 cfu/mL  --    BODY FLUID CULTURE, STERILE  Culture results to follow.  --   --        ED Treatment:    Medication Administration from 12/26/2023 1253 to 12/26/2023 2217         Date/Time Order Dose Route Action     12/26/2023 1353 EST sodium chloride 0.9 % bolus 1,000 mL 1,000 mL Intravenous New Bag     12/26/2023 1351 EST ondansetron (ZOFRAN) injection 4 mg 4 mg Intravenous Given     12/26/2023 1403 EST HYDROmorphone (DILAUDID) injection 0.5 mg 0.5 mg Intravenous Given     12/26/2023 1518 EST iohexol (OMNIPAQUE) 350 MG/ML injection (MULTI-DOSE) 100 mL 100 mL Intravenous Given     12/26/2023 1527 EST HYDROmorphone (DILAUDID) injection 0.5 mg 0.5 mg Intravenous Given     12/26/2023 1529 EST ondansetron (ZOFRAN) injection 4 mg 4 mg Intravenous Given     12/26/2023 1626 EST sodium chloride 0.9 % bolus 1,000 mL 1,000 mL Intravenous New Bag     12/26/2023 1912 EST sodium chloride 0.9 % bolus 500 mL 500 mL Intravenous New Bag     12/26/2023 1648 EST levofloxacin (LEVAQUIN) IVPB (premix in dextrose) 500 mg 100 mL 500 mg Intravenous New Bag     12/26/2023 1656 EST ondansetron (ZOFRAN) injection 4 mg 4 mg Intravenous Given     12/26/2023 1810 EST HYDROmorphone (DILAUDID) injection 0.5 mg 0.5 mg Intravenous Given     12/26/2023 1837 EST promethazine (PHENERGAN) injection 12.5 mg 12.5 mg Intravenous Given     12/26/2023 1912 EST piperacillin-tazobactam (ZOSYN) IVPB 3.375 g 3.375 g Intravenous New Bag     12/26/2023 2000 EST sodium chloride 0.9 % infusion 125 mL/hr Intravenous New Bag     12/26/2023 2029 EST HYDROmorphone (DILAUDID) injection 0.5 mg 0.5 mg Intravenous Given          Past Medical History:   Diagnosis Date    Ankylosing spondylitis (HCC)     Anxiety     Bowel obstruction (HCC)     Clostridium difficile colitis 9/13/2018    Colitis     Ileal pouchitis (HCC) 9/13/2018    Pancreatitis     Ulcerative colitis (HCC)      Present on Admission:   Abdominal wall abscess   Diarrhea   Sepsis (HCC)      Admitting Diagnosis: Nausea [R11.0]  Vomiting [R11.10]  Generalized abdominal pain [R10.84]  Abdominal wall abscess  [L02.211]  Age/Sex: 28 y.o. male  Admission Orders:  Consult IR  Scd/foot pumps    Scheduled Medications:  Medications 12/19 12/20 12/21 12/22 12/23 12/24 12/25 12/26 12/27 12/28   enoxaparin (LOVENOX) subcutaneous injection 40 mg  Dose: 40 mg  Freq: Daily Route: SC  Start: 12/27/23 0900   Admin Instructions:               0909      0916        HYDROmorphone (DILAUDID) injection 0.5 mg  Dose: 0.5 mg  Freq: Once Route: IV  Start: 12/26/23 1815 End: 12/26/23 1810   Admin Instructions:              1810          HYDROmorphone (DILAUDID) injection 0.5 mg  Dose: 0.5 mg  Freq: Once Route: IV  Start: 12/26/23 1530 End: 12/26/23 1527   Admin Instructions:              1527          HYDROmorphone (DILAUDID) injection 0.5 mg  Dose: 0.5 mg  Freq: Once Route: IV  Start: 12/26/23 1400 End: 12/26/23 1403   Admin Instructions:              1403 [C]          levofloxacin (LEVAQUIN) IVPB (premix in dextrose) 500 mg 100 mL  Dose: 500 mg  Freq: Once Route: IV  Last Dose: Stopped (12/26/23 1748)  Start: 12/26/23 1645 End: 12/26/23 1748   Admin Instructions:      Order specific questions:              1648     1748          ondansetron (ZOFRAN) injection 4 mg  Dose: 4 mg  Freq: Once Route: IV  Start: 12/26/23 1700 End: 12/26/23 1656   Admin Instructions:              1656          ondansetron (ZOFRAN) injection 4 mg  Dose: 4 mg  Freq: Once Route: IV  Start: 12/26/23 1530 End: 12/26/23 1529   Admin Instructions:              1529          ondansetron (ZOFRAN) injection 4 mg  Dose: 4 mg  Freq: Once Route: IV  Start: 12/26/23 1330 End: 12/26/23 1351   Admin Instructions:              1351          piperacillin-tazobactam (ZOSYN) IVPB 3.375 g  Dose: 3.375 g  Freq: Every 6 hours Route: IV  Last Dose: 3.375 g (12/28/23 0602)  Start: 12/26/23 1845   Order specific questions:              1912     1942      0201     0729     1217     1816      0045     0602     1324     1845        promethazine (PHENERGAN) injection 12.5 mg  Dose: 12.5  mg  Freq: Once Route: IV  Start: 12/26/23 1830 End: 12/26/23 1837   Admin Instructions:              1837          sodium chloride 0.9 % bolus 1,000 mL  Dose: 1,000 mL  Freq: Once Route: IV  Last Dose: Stopped (12/26/23 1726)  Start: 12/26/23 1615 End: 12/26/23 1726           1626     1726          sodium chloride 0.9 % bolus 1,000 mL  Dose: 1,000 mL  Freq: Once Route: IV  Last Dose: Stopped (12/26/23 1638)  Start: 12/26/23 1330 End: 12/26/23 1638           1353     1638          sodium chloride 0.9 % bolus 500 mL  Dose: 500 mL  Freq: Once Route: IV  Last Dose: Stopped (12/26/23 2004)  Start: 12/26/23 1645 End: 12/26/23 2004 1912 2004          Legend:       [id number]/1912/2012/2112/2212/2312/2412/2512/2612/2712/28        Continuous Meds Sorted by Name  for Nico Cruz as of 12/28/23 1451  Legend:       Medications 12/19 12/20 12/21 12/22 12/23 12/24 12/25 12/26 12/27 12/28   sodium chloride 0.9 % infusion  Rate: 125 mL/hr Dose: 125 mL/hr  Freq: Continuous Route: IV  Last Dose: 125 mL/hr (12/28/23 0420)  Start: 12/26/23 1930 2000      0732     1816      0420                    PRN Meds Sorted by Name  for Nico Cruz as of 12/28/23 1451  Legend:         Medications 12/19 12/20 12/21 12/22 12/23 12/24 12/25 12/26 12/27 12/28   fentanyl citrate (PF) 100 MCG/2ML  Freq: As needed Route: IV  Start: 12/27/23 1437 End: 12/27/23 1437            1437         HYDROmorphone (DILAUDID) injection 0.2 mg  Dose: 0.2 mg  Freq: Every 3 hours PRN Route: IV  PRN Reason: moderate pain  Start: 12/26/23 1928   Admin Instructions:                   HYDROmorphone (DILAUDID) injection 0.5 mg  Dose: 0.5 mg  Freq: Every 3 hours PRN Route: IV  PRN Reason: severe pain  Start: 12/26/23 1928   Admin Instructions:              2029     2359      0322     0734     1130     1514     1816     2118      0045     0601     0915     1324        iohexol (OMNIPAQUE) 350 MG/ML injection (MULTI-DOSE) 100 mL  Dose: 100  mL  Freq: Once in imaging Route: IV  PRN Reason: contrast  Start: 12/26/23 1516 End: 12/26/23 1518           1518          midazolam (VERSED) injection  Freq: As needed  Start: 12/27/23 1437 End: 12/27/23 1437            1437         ondansetron (ZOFRAN) injection 4 mg  Dose: 4 mg  Freq: Every 6 hours PRN Route: IV  PRN Reasons: nausea,vomiting  Start: 12/26/23 1928   Admin Instructions:               0000     1129     2149             Network Utilization Review Department  ATTENTION: Please call with any questions or concerns to [phone] and carefully listen to the prompts so that you are directed to the right person. All voicemails are confidential.   For Discharge needs, contact Care Management DC Support Team at [phone] opt. 2  Send all requests for admission clinical reviews, approved or denied determinations and any other requests to dedicated fax number below belonging to the campus where the patient is receiving treatment. List of dedicated fax numbers for the Facilities:  FACILITY NAME UR FAX NUMBER   ADMISSION DENIALS (Administrative/Medical Necessity) [phone]   DISCHARGE SUPPORT TEAM (NETWORK) [phone]   PARENT CHILD HEALTH (Maternity/NICU/Pediatrics) [phone]   [facility] [phone]   [facility] [phone]   [facility] [phone]   [facility] [phone]   formerly [facility] [phone]   [facility] [phone]   [facility] [phone]   [facility] [phone]   [facility] [phone]   [facility] [phone]   [facility] [phone]

## 2023-12-29 NOTE — ASSESSMENT & PLAN NOTE
Likely reactive in nature.  Noted to have thrombocytosis in the past as well  Resolved on labwork today

## 2023-12-30 PROBLEM — D75.839 THROMBOCYTOSIS: Status: RESOLVED | Noted: 2023-12-27 | Resolved: 2023-12-30

## 2023-12-30 LAB
ANION GAP SERPL CALCULATED.3IONS-SCNC: 4 MMOL/L
BUN SERPL-MCNC: 4 MG/DL (ref 5–25)
CALCIUM SERPL-MCNC: 8.6 MG/DL (ref 8.4–10.2)
CHLORIDE SERPL-SCNC: 106 MMOL/L (ref 96–108)
CO2 SERPL-SCNC: 27 MMOL/L (ref 21–32)
CREAT SERPL-MCNC: 0.8 MG/DL (ref 0.6–1.3)
GFR SERPL CREATININE-BSD FRML MDRD: 119 ML/MIN/1.73SQ M
GLUCOSE SERPL-MCNC: 103 MG/DL (ref 65–140)
MAGNESIUM SERPL-MCNC: 1.8 MG/DL (ref 1.9–2.7)
POTASSIUM SERPL-SCNC: 3.3 MMOL/L (ref 3.5–5.3)
SODIUM SERPL-SCNC: 137 MMOL/L (ref 135–147)

## 2023-12-30 PROCEDURE — 83735 ASSAY OF MAGNESIUM: CPT | Performed by: FAMILY MEDICINE

## 2023-12-30 PROCEDURE — 80048 BASIC METABOLIC PNL TOTAL CA: CPT | Performed by: FAMILY MEDICINE

## 2023-12-30 PROCEDURE — 99232 SBSQ HOSP IP/OBS MODERATE 35: CPT | Performed by: FAMILY MEDICINE

## 2023-12-30 RX ORDER — POTASSIUM CHLORIDE 14.9 MG/ML
20 INJECTION INTRAVENOUS ONCE
Status: COMPLETED | OUTPATIENT
Start: 2023-12-30 | End: 2023-12-30

## 2023-12-30 RX ORDER — POTASSIUM CHLORIDE 29.8 MG/ML
40 INJECTION INTRAVENOUS ONCE
Status: DISCONTINUED | OUTPATIENT
Start: 2023-12-30 | End: 2023-12-30 | Stop reason: CLARIF

## 2023-12-30 RX ORDER — PROCHLORPERAZINE MALEATE 5 MG/1
5 TABLET ORAL EVERY 6 HOURS PRN
Status: DISCONTINUED | OUTPATIENT
Start: 2023-12-30 | End: 2023-12-31 | Stop reason: HOSPADM

## 2023-12-30 RX ORDER — PROCHLORPERAZINE MALEATE 5 MG/1
5 TABLET ORAL EVERY 6 HOURS PRN
Status: DISCONTINUED | OUTPATIENT
Start: 2023-12-30 | End: 2023-12-30

## 2023-12-30 RX ORDER — MAGNESIUM SULFATE HEPTAHYDRATE 40 MG/ML
2 INJECTION, SOLUTION INTRAVENOUS ONCE
Status: COMPLETED | OUTPATIENT
Start: 2023-12-30 | End: 2023-12-30

## 2023-12-30 RX ADMIN — HYDROMORPHONE HYDROCHLORIDE 0.2 MG: 1 INJECTION, SOLUTION INTRAMUSCULAR; INTRAVENOUS; SUBCUTANEOUS at 02:10

## 2023-12-30 RX ADMIN — METRONIDAZOLE 500 MG: 500 INJECTION, SOLUTION INTRAVENOUS at 02:02

## 2023-12-30 RX ADMIN — CEFEPIME HYDROCHLORIDE 2000 MG: 2 INJECTION, SOLUTION INTRAVENOUS at 03:16

## 2023-12-30 RX ADMIN — VANCOMYCIN HYDROCHLORIDE 2000 MG: 10 INJECTION, POWDER, LYOPHILIZED, FOR SOLUTION INTRAVENOUS at 14:31

## 2023-12-30 RX ADMIN — POTASSIUM CHLORIDE 20 MEQ: 14.9 INJECTION, SOLUTION INTRAVENOUS at 14:31

## 2023-12-30 RX ADMIN — MAGNESIUM SULFATE HEPTAHYDRATE 2 G: 40 INJECTION, SOLUTION INTRAVENOUS at 09:52

## 2023-12-30 RX ADMIN — ENOXAPARIN SODIUM 40 MG: 40 INJECTION SUBCUTANEOUS at 08:03

## 2023-12-30 RX ADMIN — PROCHLORPERAZINE MALEATE 5 MG: 5 TABLET ORAL at 14:31

## 2023-12-30 RX ADMIN — POTASSIUM CHLORIDE 20 MEQ: 14.9 INJECTION, SOLUTION INTRAVENOUS at 11:47

## 2023-12-30 RX ADMIN — CEFEPIME HYDROCHLORIDE 2000 MG: 2 INJECTION, SOLUTION INTRAVENOUS at 11:47

## 2023-12-30 RX ADMIN — HYDROMORPHONE HYDROCHLORIDE 0.5 MG: 1 INJECTION, SOLUTION INTRAMUSCULAR; INTRAVENOUS; SUBCUTANEOUS at 01:08

## 2023-12-30 RX ADMIN — ONDANSETRON 4 MG: 2 INJECTION INTRAMUSCULAR; INTRAVENOUS at 21:06

## 2023-12-30 RX ADMIN — HYDROMORPHONE HYDROCHLORIDE 0.5 MG: 1 INJECTION, SOLUTION INTRAMUSCULAR; INTRAVENOUS; SUBCUTANEOUS at 05:05

## 2023-12-30 RX ADMIN — HYDROMORPHONE HYDROCHLORIDE 0.5 MG: 1 INJECTION, SOLUTION INTRAMUSCULAR; INTRAVENOUS; SUBCUTANEOUS at 17:41

## 2023-12-30 RX ADMIN — Medication 250 MG: at 08:03

## 2023-12-30 RX ADMIN — HYDROMORPHONE HYDROCHLORIDE 0.5 MG: 1 INJECTION, SOLUTION INTRAMUSCULAR; INTRAVENOUS; SUBCUTANEOUS at 21:07

## 2023-12-30 RX ADMIN — HYDROMORPHONE HYDROCHLORIDE 0.5 MG: 1 INJECTION, SOLUTION INTRAMUSCULAR; INTRAVENOUS; SUBCUTANEOUS at 11:04

## 2023-12-30 RX ADMIN — SODIUM CHLORIDE 125 ML/HR: 0.9 INJECTION, SOLUTION INTRAVENOUS at 18:31

## 2023-12-30 RX ADMIN — METRONIDAZOLE 500 MG: 500 INJECTION, SOLUTION INTRAVENOUS at 09:51

## 2023-12-30 RX ADMIN — SODIUM CHLORIDE 125 ML/HR: 0.9 INJECTION, SOLUTION INTRAVENOUS at 08:03

## 2023-12-30 RX ADMIN — ONDANSETRON 4 MG: 2 INJECTION INTRAMUSCULAR; INTRAVENOUS at 01:08

## 2023-12-30 RX ADMIN — HYDROMORPHONE HYDROCHLORIDE 0.5 MG: 1 INJECTION, SOLUTION INTRAMUSCULAR; INTRAVENOUS; SUBCUTANEOUS at 08:05

## 2023-12-30 RX ADMIN — PROCHLORPERAZINE MALEATE 5 MG: 5 TABLET ORAL at 05:55

## 2023-12-30 RX ADMIN — ONDANSETRON 4 MG: 2 INJECTION INTRAMUSCULAR; INTRAVENOUS at 11:04

## 2023-12-30 RX ADMIN — HYDROMORPHONE HYDROCHLORIDE 0.5 MG: 1 INJECTION, SOLUTION INTRAMUSCULAR; INTRAVENOUS; SUBCUTANEOUS at 14:26

## 2023-12-30 NOTE — PLAN OF CARE
Problem: PAIN - ADULT  Goal: Verbalizes/displays adequate comfort level or baseline comfort level  Description: Interventions:  - Encourage patient to monitor pain and request assistance  - Assess pain using appropriate pain scale  - Administer analgesics based on type and severity of pain and evaluate response  - Implement non-pharmacological measures as appropriate and evaluate response  - Consider cultural and social influences on pain and pain management  - Notify physician/advanced practitioner if interventions unsuccessful or patient reports new pain  Outcome: Progressing     Problem: INFECTION - ADULT  Goal: Absence or prevention of progression during hospitalization  Description: INTERVENTIONS:  - Assess and monitor for signs and symptoms of infection  - Monitor lab/diagnostic results  - Monitor all insertion sites, i.e. indwelling lines, tubes, and drains  - Monitor endotracheal if appropriate and nasal secretions for changes in amount and color  - [place] appropriate cooling/warming therapies per order  - Administer medications as ordered  - Instruct and encourage patient and family to use good hand hygiene technique  - Identify and instruct in appropriate isolation precautions for identified infection/condition  Outcome: Progressing  Goal: Absence of fever/infection during neutropenic period  Description: INTERVENTIONS:  - Monitor WBC    Outcome: Progressing     Problem: SAFETY ADULT  Goal: Patient will remain free of falls  Description: INTERVENTIONS:  - Educate patient/family on patient safety including physical limitations  - Instruct patient to call for assistance with activity   - Consult OT/PT to assist with strengthening/mobility   - Keep Call bell within reach  - Keep bed low and locked with side rails adjusted as appropriate  - Keep care items and personal belongings within reach  - Initiate and maintain comfort rounds  - Make Fall Risk Sign visible to staff  - Offer Toileting every 2 Hours,  in advance of need  - Apply yellow socks and bracelet for high fall risk patients  - Consider moving patient to room near nurses station  Outcome: Progressing  Goal: Maintain or return to baseline ADL function  Description: INTERVENTIONS:  -  Assess patient's ability to carry out ADLs; assess patient's baseline for ADL function and identify physical deficits which impact ability to perform ADLs (bathing, care of mouth/teeth, toileting, grooming, dressing, etc.)  - Assess/evaluate cause of self-care deficits   - Assess range of motion  - Assess patient's mobility; develop plan if impaired  - Assess patient's need for assistive devices and provide as appropriate  - Encourage maximum independence but intervene and supervise when necessary  - Involve family in performance of ADLs  - Assess for home care needs following discharge   - Consider OT consult to assist with ADL evaluation and planning for discharge  - Provide patient education as appropriate  Outcome: Progressing  Goal: Maintains/Returns to pre admission functional level  Description: INTERVENTIONS:  - Perform AM-PAC 6 Click Basic Mobility/ Daily Activity assessment daily.  - Set and communicate daily mobility goal to care team and patient/family/caregiver.   - Collaborate with rehabilitation services on mobility goals if consulted  - Ambulate patient 3 times a day  - Out of bed to chair 3 times a day   - Out of bed for meals 3 times a day  - Out of bed for toileting  - Record patient progress and toleration of activity level   Outcome: Progressing     Problem: DISCHARGE PLANNING  Goal: Discharge to home or other facility with appropriate resources  Description: INTERVENTIONS:  - Identify barriers to discharge w/patient and caregiver  - Arrange for needed discharge resources and transportation as appropriate  - Identify discharge learning needs (meds, wound care, etc.)  - Refer to Case Management Department for coordinating discharge planning if the patient  needs post-hospital services based on physician/advanced practitioner order or complex needs related to functional status, cognitive ability, or social support system  Outcome: Progressing     Problem: Knowledge Deficit  Goal: Patient/family/caregiver demonstrates understanding of disease process, treatment plan, medications, and discharge instructions  Description: Complete learning assessment and assess knowledge base.  Interventions:  - Provide teaching at level of understanding  - Provide teaching via preferred learning methods  Outcome: Progressing

## 2023-12-30 NOTE — ASSESSMENT & PLAN NOTE
As noted by leukocytosis, tachycardia likely due to abdominal wall abscess  Also noted to have lactic acidosis but this is likely related to dehydration, persistent vomiting  Lactic acidosis resolved.  Fluid boluses given in the ER and currently on IV fluids  blood cultures neg so far.  Leukocytosis resolved within 24 hours and therefore leukocytosis is at least partly reactive in nature/hemoconcentrated

## 2023-12-30 NOTE — ASSESSMENT & PLAN NOTE
Patient presented to the ER with complaints of abdominal pain  Imaging done in the ER showed fluid collection concerning for abscess in the right anterior abdominal wall  Patient received a dose of IV Levaquin in the ER.   Discontinued IV Zosyn -->  cefepime and Flagyl as patient with multiple episodes of diarrhea which in part likely due to Abx use  Discontinue cefepime and Flagyl and change to vancomycin aspirate culture growing Enterococcus.  No anaerobic growth  Patient underwent aspiration of abdominal wall fluid collection and no drain was placed  IV Dilaudid as needed for pain management

## 2023-12-30 NOTE — PLAN OF CARE
Problem: PAIN - ADULT  Goal: Verbalizes/displays adequate comfort level or baseline comfort level  Description: Interventions:  - Encourage patient to monitor pain and request assistance  - Assess pain using appropriate pain scale  - Administer analgesics based on type and severity of pain and evaluate response  - Implement non-pharmacological measures as appropriate and evaluate response  - Consider cultural and social influences on pain and pain management  - Notify physician/advanced practitioner if interventions unsuccessful or patient reports new pain  Outcome: Progressing     Problem: INFECTION - ADULT  Goal: Absence or prevention of progression during hospitalization  Description: INTERVENTIONS:  - Assess and monitor for signs and symptoms of infection  - Monitor lab/diagnostic results  - Monitor all insertion sites, i.e. indwelling lines, tubes, and drains  - Monitor endotracheal if appropriate and nasal secretions for changes in amount and color  - [place] appropriate cooling/warming therapies per order  - Administer medications as ordered  - Instruct and encourage patient and family to use good hand hygiene technique  - Identify and instruct in appropriate isolation precautions for identified infection/condition  Outcome: Progressing  Goal: Absence of fever/infection during neutropenic period  Description: INTERVENTIONS:  - Monitor WBC    Outcome: Progressing     Problem: SAFETY ADULT  Goal: Patient will remain free of falls  Description: INTERVENTIONS:  - Educate patient/family on patient safety including physical limitations  - Instruct patient to call for assistance with activity   - Consult OT/PT to assist with strengthening/mobility   - Keep Call bell within reach  - Keep bed low and locked with side rails adjusted as appropriate  - Keep care items and personal belongings within reach  - Initiate and maintain comfort rounds  - Make Fall Risk Sign visible to staff  - Offer Toileting every 2 Hours,  in advance of need  - Apply yellow socks and bracelet for high fall risk patients  - Consider moving patient to room near nurses station  Outcome: Progressing  Goal: Maintain or return to baseline ADL function  Description: INTERVENTIONS:  -  Assess patient's ability to carry out ADLs; assess patient's baseline for ADL function and identify physical deficits which impact ability to perform ADLs (bathing, care of mouth/teeth, toileting, grooming, dressing, etc.)  - Assess/evaluate cause of self-care deficits   - Assess range of motion  - Assess patient's mobility; develop plan if impaired  - Assess patient's need for assistive devices and provide as appropriate  - Encourage maximum independence but intervene and supervise when necessary  - Involve family in performance of ADLs  - Assess for home care needs following discharge   - Consider OT consult to assist with ADL evaluation and planning for discharge  - Provide patient education as appropriate  Outcome: Progressing  Goal: Maintains/Returns to pre admission functional level  Description: INTERVENTIONS:  - Perform AM-PAC 6 Click Basic Mobility/ Daily Activity assessment daily.  - Set and communicate daily mobility goal to care team and patient/family/caregiver.   - Collaborate with rehabilitation services on mobility goals if consulted  - Ambulate patient 3 times a day  - Out of bed to chair 3 times a day   - Out of bed for meals 3 times a day  - Out of bed for toileting  - Record patient progress and toleration of activity level   Outcome: Progressing     Problem: DISCHARGE PLANNING  Goal: Discharge to home or other facility with appropriate resources  Description: INTERVENTIONS:  - Identify barriers to discharge w/patient and caregiver  - Arrange for needed discharge resources and transportation as appropriate  - Identify discharge learning needs (meds, wound care, etc.)  - Refer to Case Management Department for coordinating discharge planning if the patient  needs post-hospital services based on physician/advanced practitioner order or complex needs related to functional status, cognitive ability, or social support system  Outcome: Progressing     Problem: Knowledge Deficit  Goal: Patient/family/caregiver demonstrates understanding of disease process, treatment plan, medications, and discharge instructions  Description: Complete learning assessment and assess knowledge base.  Interventions:  - Provide teaching at level of understanding  - Provide teaching via preferred learning methods  Outcome: Progressing

## 2023-12-30 NOTE — PROGRESS NOTES
"[facility]  Progress Note  Name: Nico Cruz I  MRN: [id number]  Unit/Bed#: [address] [place] [address] Date of Admission: 12/26/2023   Date of Service: 12/30/2023 I Hospital Day: 2    Assessment/Plan   * Sepsis (HCC)  Assessment & Plan  As noted by leukocytosis, tachycardia likely due to abdominal wall abscess  Also noted to have lactic acidosis but this is likely related to dehydration, persistent vomiting  Lactic acidosis resolved.  Fluid boluses given in the ER and currently on IV fluids  blood cultures neg so far.  Leukocytosis resolved within 24 hours and therefore leukocytosis is at least partly reactive in nature/hemoconcentrated    Abdominal wall abscess  Assessment & Plan  Patient presented to the ER with complaints of abdominal pain  Imaging done in the ER showed fluid collection concerning for abscess in the right anterior abdominal wall  Patient received a dose of IV Levaquin in the ER.   Discontinued IV Zosyn -->  cefepime and Flagyl as patient with multiple episodes of diarrhea which in part likely due to Abx use  Discontinue cefepime and Flagyl and change to vancomycin aspirate culture growing Enterococcus.  No anaerobic growth  Patient underwent aspiration of abdominal wall fluid collection and no drain was placed  IV Dilaudid as needed for pain management    Diarrhea  Assessment & Plan  Patient presents with multiple episodes of diarrhea, nausea, vomiting. Family members at home sick as well with \"flu\" symptoms  Likely viral gastroenteritis  Symptom management with IV fluids, IV Zofran as needed, Compazine as needed second line  N.p.o. --> full liquid diet with crackers and toast --> low fiber low residue today  C. difficile, bacterial enteric panel negative  Discussed with surgery given his abdominal surgeries and started on Lomotil as needed to which diarrhea has significantly improved    Electrolyte abnormality  Assessment & Plan  Likely due to diarrhea, decreased p.o. " intake  Replete mag and potassium today and get repeat lab work in a.m.    Thrombocytosis-resolved as of 2023  Assessment & Plan  Likely reactive in nature.  Noted to have thrombocytosis in the past as well  Resolved on labwork.               VTE Pharmacologic Prophylaxis:    Lovenox    Mobility:   Basic Mobility Inpatient Raw Score: 24  JH-HLM Goal: 8: Walk 250 feet or more  JH-HLM Achieved: 8: Walk 250 feet ot more  HLM Goal achieved. Continue to encourage appropriate mobility.    Patient Centered Rounds: I performed bedside rounds with nursing staff today.   Discussions with Specialists or Other Care Team Provider: yes    Education and Discussions with Family / Patient: Updated  (significant other) at bedside.    Total Time Spent on Date of Encounter in care of patient: This time was spent on one or more of the following: performing physical exam; counseling and coordination of care; obtaining or reviewing history; documenting in the medical record; reviewing/ordering tests, medications or procedures; communicating with other healthcare professionals and discussing with patient's family/caregivers.    Current Length of Stay: 2 day(s)  Current Patient Status: Inpatient   Certification Statement: The patient will continue to require additional inpatient hospital stay due to diarrhea, abdominal wall abscess  Discharge Plan: Anticipate discharge in 24-48 hrs to home.    Code Status: Level 1 - Full Code    Subjective:     Reports significant improvement in diarrhea.  This morning felt extremely nauseated and could not eat breakfast.  No vomiting.    Objective:     Vitals:   Temp (24hrs), Av.3 °F (36.8 °C), Min:98.1 °F (36.7 °C), Max:98.7 °F (37.1 °C)    Temp:  [98.1 °F (36.7 °C)-98.7 °F (37.1 °C)] 98.2 °F (36.8 °C)  HR:  [70-76] 72  Resp:  [17-18] 18  BP: (106-126)/(62-78) 126/77  SpO2:  [97 %-99 %] 97 %  Body mass index is 25.99 kg/m².     Input and Output Summary (last 24 hours):   No intake  or output data in the 24 hours ending 12/30/23 0916    Physical Exam:   Physical Exam  Vitals reviewed.   Constitutional:       General: He is not in acute distress.     Appearance: He is not toxic-appearing.   HENT:      Head: Normocephalic and atraumatic.   Eyes:      General:         Right eye: No discharge.         Left eye: No discharge.   Cardiovascular:      Rate and Rhythm: Normal rate and regular rhythm.   Pulmonary:      Effort: Pulmonary effort is normal. No respiratory distress.      Breath sounds: Normal breath sounds. No wheezing or rales.   Abdominal:      General: Bowel sounds are normal. There is no distension.      Palpations: Abdomen is soft.      Tenderness: There is no abdominal tenderness.      Comments: Prior ostomy site with dressing in place   Neurological:      Mental Status: He is alert and oriented to person, place, and time.          Additional Data:     Labs:  Results from last 7 days   Lab Units 12/28/23  0609 12/27/23  1050 12/26/23  1349   WBC Thousand/uL 8.81   < > 23.67*   HEMOGLOBIN g/dL 10.3*   < > 14.0   HEMATOCRIT % 35.1*   < > 46.2   PLATELETS Thousands/uL 349   < > 275   NEUTROS PCT %  --   --  89*   LYMPHS PCT %  --   --  2*   MONOS PCT %  --   --  6   EOS PCT %  --   --  1    < > = values in this interval not displayed.     Results from last 7 days   Lab Units 12/30/23  0455 12/27/23  1205 12/26/23  1409   SODIUM mmol/L 137   < > 138   POTASSIUM mmol/L 3.3*   < > 4.9   CHLORIDE mmol/L 106   < > 110*   CO2 mmol/L 27   < > 20*   BUN mg/dL 4*   < > 12   CREATININE mg/dL 0.80   < > 0.78   ANION GAP mmol/L 4   < > 8   CALCIUM mg/dL 8.6   < > 7.3*   ALBUMIN g/dL  --   --  3.8   TOTAL BILIRUBIN mg/dL  --   --  0.87   ALK PHOS U/L  --   --  54   ALT U/L  --   --  10   AST U/L  --   --  31   GLUCOSE RANDOM mg/dL 103   < > 80    < > = values in this interval not displayed.                 Results from last 7 days   Lab Units 12/27/23  0713 12/26/23  1956 12/26/23  1751 12/26/23  0838  12/26/23  1409   LACTIC ACID mmol/L  --  1.6 2.4* 3.1*  --    PROCALCITONIN ng/ml 0.31*  --   --   --  0.16       Lines/Drains:  Invasive Devices       Peripheral Intravenous Line  Duration             Peripheral IV 12/30/23 Left;Ventral (anterior) Forearm <1 day                          Imaging: No pertinent imaging reviewed.    Recent Cultures (last 7 days):   Results from last 7 days   Lab Units 12/27/23  1455 12/27/23  1102 12/26/23  1516   BLOOD CULTURE   --   --  No Growth at 72 hrs.  No Growth at 72 hrs.   GRAM STAIN RESULT  2+ Polys  No bacteria seen  --   --    URINE CULTURE   --  No Growth <1000 cfu/mL  --    BODY FLUID CULTURE, STERILE  Culture results to follow.  --   --    C DIFF TOXIN B BY PCR   --  Negative  --        Last 24 Hours Medication List:   Current Facility-Administered Medications   Medication Dose Route Frequency Provider Last Rate    cefepime  2,000 mg Intravenous Q8H Darlin Martinez, DO 2,000 mg (12/30/23 0316)    diphenoxylate-atropine  1 tablet Oral 4x Daily PRN Darlin Revankar, DO      enoxaparin  40 mg Subcutaneous Daily Darlin Revankar, DO      HYDROmorphone  0.2 mg Intravenous Q3H PRN Darlin Revankar, DO      HYDROmorphone  0.5 mg Intravenous Q3H PRN Darlin Revankar, DO      magnesium sulfate  2 g Intravenous Once Darlin Revankar, DO      metroNIDAZOLE  500 mg Intravenous Q8H Darlin Revankar,  mg (12/30/23 0202)    ondansetron  4 mg Intravenous Q6H PRN Darlin Revankar, DO      potassium chloride  40 mEq Intravenous Once Darlin Revankar, DO      prochlorperazine  5 mg Oral Q6H PRN Kayei [place], DO      saccharomyces boulardii  250 mg Oral BID Darlin Revankar, DO      sodium chloride  125 mL/hr Intravenous Continuous Darlin Revankar,  mL/hr (12/30/23 0803)        Today, Patient Was Seen By: Darlin Martinez DO    **Please Note: This note may have been constructed using a voice recognition system.**

## 2023-12-30 NOTE — ASSESSMENT & PLAN NOTE
Likely due to diarrhea, decreased p.o. intake  Replete mag and potassium today and get repeat lab work in a.m.

## 2023-12-30 NOTE — PROGRESS NOTES
Nico Cruz is a 28 y.o. male who is currently ordered Vancomycin IV with management by the Pharmacy Consult service.  Relevant clinical data and objective / subjective history reviewed.  Vancomycin Assessment:  Indication and Goal AUC/Trough: Soft tissue (goal -600, trough >10)  Clinical Status: new  Micro:     Renal Function:  SCr: 0.8 mg/dL  CrCl: 135 mL/min  Renal replacement: Not on dialysis  Days of Therapy: 1  Current Dose: 2,000 mg x1  Vancomycin Plan:  New Dosin,500 mg Q12H  Estimated AUC: 555 mcg*hr/mL  Estimated Trough: 15.9 mcg/mL  Next Level: 0600 on 24  Renal Function Monitoring: Daily BMP and UOP  Pharmacy will continue to follow closely for s/sx of nephrotoxicity, infusion reactions and appropriateness of therapy.  BMP and CBC will be ordered per protocol. We will continue to follow the patient’s culture results and clinical progress daily.    Ryan Singh, Pharmacist

## 2023-12-30 NOTE — ASSESSMENT & PLAN NOTE
As noted by leukocytosis, tachycardia likely due to abdominal wall abscess  Also noted to have lactic acidosis but this is likely related to dehydration, persistent vomiting  Lactic acidosis resolved.  Fluid boluses given in the ER and currently on IV fluids  blood cultures neg so far.  NG 4 days  Completed course of antibiotics

## 2023-12-30 NOTE — ASSESSMENT & PLAN NOTE
"Patient presents with multiple episodes of diarrhea, nausea, vomiting. Family members at home sick as well with \"flu\" symptoms  Likely viral gastroenteritis  Symptom management with IV fluids, IV Zofran as needed, Compazine as needed second line  N.p.o. --> full liquid diet with crackers and toast --> low fiber low residue today  C. difficile, bacterial enteric panel negative  Discussed with surgery given his abdominal surgeries and started on Lomotil as needed to which diarrhea has significantly improved  "

## 2023-12-30 NOTE — ASSESSMENT & PLAN NOTE
Likely reactive in nature.  Noted to have thrombocytosis in the past as well  Resolved on labwork.

## 2023-12-30 NOTE — ASSESSMENT & PLAN NOTE
Patient presented to the ER with complaints of abdominal pain  Imaging done in the ER showed fluid collection concerning for abscess in the right anterior abdominal wall  Patient received a dose of IV Levaquin in the ER.   Discontinued IV Zosyn -->  cefepime and Flagyl as patient with multiple episodes of diarrhea which in part likely due to Abx use  Discontinue cefepime and Flagyl and change to vancomycin aspirate culture growing Enterococcus.  No anaerobic growth  Patient underwent aspiration of abdominal wall fluid collection and no drain was placed  IV Dilaudid as needed for pain management  Discussed with surgery  No need for discharge antibiotics, cultures of body fluid E faecalis  Zinc for bowel movements

## 2023-12-31 ENCOUNTER — TELEPHONE (OUTPATIENT)
Dept: FAMILY MEDICINE CLINIC | Facility: CLINIC | Age: 28
End: 2023-12-31

## 2023-12-31 VITALS
HEART RATE: 67 BPM | RESPIRATION RATE: 18 BRPM | OXYGEN SATURATION: 95 % | BODY MASS INDEX: 26.07 KG/M2 | TEMPERATURE: 98.3 F | DIASTOLIC BLOOD PRESSURE: 81 MMHG | SYSTOLIC BLOOD PRESSURE: 126 MMHG | HEIGHT: 69 IN | WEIGHT: 176 LBS

## 2023-12-31 PROBLEM — R19.7 DIARRHEA: Status: RESOLVED | Noted: 2023-12-20 | Resolved: 2023-12-31

## 2023-12-31 PROBLEM — E87.8 ELECTROLYTE ABNORMALITY: Status: RESOLVED | Noted: 2023-01-18 | Resolved: 2023-12-31

## 2023-12-31 LAB
ANION GAP SERPL CALCULATED.3IONS-SCNC: 8 MMOL/L
BACTERIA BLD CULT: NORMAL
BACTERIA BLD CULT: NORMAL
BACTERIA SPEC BFLD CULT: ABNORMAL
BUN SERPL-MCNC: 4 MG/DL (ref 5–25)
CALCIUM SERPL-MCNC: 8.8 MG/DL (ref 8.4–10.2)
CHLORIDE SERPL-SCNC: 108 MMOL/L (ref 96–108)
CO2 SERPL-SCNC: 21 MMOL/L (ref 21–32)
CREAT SERPL-MCNC: 0.69 MG/DL (ref 0.6–1.3)
GFR SERPL CREATININE-BSD FRML MDRD: 127 ML/MIN/1.73SQ M
GLUCOSE SERPL-MCNC: 81 MG/DL (ref 65–140)
GRAM STN SPEC: ABNORMAL
GRAM STN SPEC: ABNORMAL
MAGNESIUM SERPL-MCNC: 2.1 MG/DL (ref 1.9–2.7)
POTASSIUM SERPL-SCNC: 4 MMOL/L (ref 3.5–5.3)
SODIUM SERPL-SCNC: 137 MMOL/L (ref 135–147)

## 2023-12-31 PROCEDURE — 83735 ASSAY OF MAGNESIUM: CPT | Performed by: FAMILY MEDICINE

## 2023-12-31 PROCEDURE — 99239 HOSP IP/OBS DSCHRG MGMT >30: CPT | Performed by: STUDENT IN AN ORGANIZED HEALTH CARE EDUCATION/TRAINING PROGRAM

## 2023-12-31 PROCEDURE — 80048 BASIC METABOLIC PNL TOTAL CA: CPT | Performed by: FAMILY MEDICINE

## 2023-12-31 RX ORDER — PROCHLORPERAZINE MALEATE 5 MG/1
5 TABLET ORAL EVERY 6 HOURS PRN
Qty: 12 TABLET | Refills: 0 | Status: SHIPPED | OUTPATIENT
Start: 2023-12-31

## 2023-12-31 RX ORDER — ZINC OXIDE 20 %
OINTMENT (GRAM) TOPICAL AS NEEDED
Qty: 28 G | Refills: 0 | Status: SHIPPED | OUTPATIENT
Start: 2023-12-31

## 2023-12-31 RX ORDER — DIPHENOXYLATE HYDROCHLORIDE AND ATROPINE SULFATE 2.5; .025 MG/1; MG/1
1 TABLET ORAL 4 TIMES DAILY PRN
Qty: 10 TABLET | Refills: 0 | Status: SHIPPED | OUTPATIENT
Start: 2023-12-31

## 2023-12-31 RX ADMIN — HYDROMORPHONE HYDROCHLORIDE 0.5 MG: 1 INJECTION, SOLUTION INTRAMUSCULAR; INTRAVENOUS; SUBCUTANEOUS at 04:24

## 2023-12-31 RX ADMIN — ENOXAPARIN SODIUM 40 MG: 40 INJECTION SUBCUTANEOUS at 08:06

## 2023-12-31 RX ADMIN — HYDROMORPHONE HYDROCHLORIDE 0.5 MG: 1 INJECTION, SOLUTION INTRAMUSCULAR; INTRAVENOUS; SUBCUTANEOUS at 15:40

## 2023-12-31 RX ADMIN — Medication 250 MG: at 17:39

## 2023-12-31 RX ADMIN — VANCOMYCIN HYDROCHLORIDE 1500 MG: 10 INJECTION, POWDER, LYOPHILIZED, FOR SOLUTION INTRAVENOUS at 14:19

## 2023-12-31 RX ADMIN — ONDANSETRON 4 MG: 2 INJECTION INTRAMUSCULAR; INTRAVENOUS at 11:53

## 2023-12-31 RX ADMIN — HYDROMORPHONE HYDROCHLORIDE 0.5 MG: 1 INJECTION, SOLUTION INTRAMUSCULAR; INTRAVENOUS; SUBCUTANEOUS at 00:59

## 2023-12-31 RX ADMIN — HYDROMORPHONE HYDROCHLORIDE 0.5 MG: 1 INJECTION, SOLUTION INTRAMUSCULAR; INTRAVENOUS; SUBCUTANEOUS at 11:53

## 2023-12-31 RX ADMIN — SODIUM CHLORIDE 125 ML/HR: 0.9 INJECTION, SOLUTION INTRAVENOUS at 04:26

## 2023-12-31 RX ADMIN — Medication 250 MG: at 08:05

## 2023-12-31 RX ADMIN — VANCOMYCIN HYDROCHLORIDE 1500 MG: 10 INJECTION, POWDER, LYOPHILIZED, FOR SOLUTION INTRAVENOUS at 01:12

## 2023-12-31 RX ADMIN — SODIUM CHLORIDE 125 ML/HR: 0.9 INJECTION, SOLUTION INTRAVENOUS at 14:19

## 2023-12-31 RX ADMIN — HYDROMORPHONE HYDROCHLORIDE 0.5 MG: 1 INJECTION, SOLUTION INTRAMUSCULAR; INTRAVENOUS; SUBCUTANEOUS at 08:05

## 2023-12-31 NOTE — PLAN OF CARE
Problem: PAIN - ADULT  Goal: Verbalizes/displays adequate comfort level or baseline comfort level  Description: Interventions:  - Encourage patient to monitor pain and request assistance  - Assess pain using appropriate pain scale  - Administer analgesics based on type and severity of pain and evaluate response  - Implement non-pharmacological measures as appropriate and evaluate response  - Consider cultural and social influences on pain and pain management  - Notify physician/advanced practitioner if interventions unsuccessful or patient reports new pain  Outcome: Progressing     Problem: INFECTION - ADULT  Goal: Absence or prevention of progression during hospitalization  Description: INTERVENTIONS:  - Assess and monitor for signs and symptoms of infection  - Monitor lab/diagnostic results  - Monitor all insertion sites, i.e. indwelling lines, tubes, and drains  - Monitor endotracheal if appropriate and nasal secretions for changes in amount and color  - [place] appropriate cooling/warming therapies per order  - Administer medications as ordered  - Instruct and encourage patient and family to use good hand hygiene technique  - Identify and instruct in appropriate isolation precautions for identified infection/condition  Outcome: Progressing  Goal: Absence of fever/infection during neutropenic period  Description: INTERVENTIONS:  - Monitor WBC    Outcome: Progressing     Problem: SAFETY ADULT  Goal: Patient will remain free of falls  Description: INTERVENTIONS:  - Educate patient/family on patient safety including physical limitations  - Instruct patient to call for assistance with activity   - Consult OT/PT to assist with strengthening/mobility   - Keep Call bell within reach  - Keep bed low and locked with side rails adjusted as appropriate  - Keep care items and personal belongings within reach  - Initiate and maintain comfort rounds  - Make Fall Risk Sign visible to staff  - Offer Toileting every 2 Hours,  in advance of need  - Apply yellow socks and bracelet for high fall risk patients  - Consider moving patient to room near nurses station  Outcome: Progressing  Goal: Maintain or return to baseline ADL function  Description: INTERVENTIONS:  -  Assess patient's ability to carry out ADLs; assess patient's baseline for ADL function and identify physical deficits which impact ability to perform ADLs (bathing, care of mouth/teeth, toileting, grooming, dressing, etc.)  - Assess/evaluate cause of self-care deficits   - Assess range of motion  - Assess patient's mobility; develop plan if impaired  - Assess patient's need for assistive devices and provide as appropriate  - Encourage maximum independence but intervene and supervise when necessary  - Involve family in performance of ADLs  - Assess for home care needs following discharge   - Consider OT consult to assist with ADL evaluation and planning for discharge  - Provide patient education as appropriate  Outcome: Progressing  Goal: Maintains/Returns to pre admission functional level  Description: INTERVENTIONS:  - Perform AM-PAC 6 Click Basic Mobility/ Daily Activity assessment daily.  - Set and communicate daily mobility goal to care team and patient/family/caregiver.   - Collaborate with rehabilitation services on mobility goals if consulted  - Ambulate patient 3 times a day  - Out of bed to chair 3 times a day   - Out of bed for meals 3 times a day  - Out of bed for toileting  - Record patient progress and toleration of activity level   Outcome: Progressing     Problem: DISCHARGE PLANNING  Goal: Discharge to home or other facility with appropriate resources  Description: INTERVENTIONS:  - Identify barriers to discharge w/patient and caregiver  - Arrange for needed discharge resources and transportation as appropriate  - Identify discharge learning needs (meds, wound care, etc.)  - Refer to Case Management Department for coordinating discharge planning if the patient  needs post-hospital services based on physician/advanced practitioner order or complex needs related to functional status, cognitive ability, or social support system  Outcome: Progressing     Problem: Knowledge Deficit  Goal: Patient/family/caregiver demonstrates understanding of disease process, treatment plan, medications, and discharge instructions  Description: Complete learning assessment and assess knowledge base.  Interventions:  - Provide teaching at level of understanding  - Provide teaching via preferred learning methods  Outcome: Progressing

## 2023-12-31 NOTE — DISCHARGE SUMMARY
"[facility]  Discharge- Nico ANTONIO Cruz 1995, 28 y.o. male MRN: [id number]  Unit/Bed#: [address] [place] [address] Encounter: [id number]  Primary Care Provider: Smooth Dietz DO   Date and time admitted to hospital: 12/26/2023  1:31 PM    Electrolyte abnormality-resolved as of 12/31/2023  Assessment & Plan  Likely due to diarrhea, decreased p.o. intake  Replete mag and potassium today and get repeat lab work in a.m.    * Sepsis (HCC)  Assessment & Plan  As noted by leukocytosis, tachycardia likely due to abdominal wall abscess  Also noted to have lactic acidosis but this is likely related to dehydration, persistent vomiting  Lactic acidosis resolved.  Fluid boluses given in the ER and currently on IV fluids  blood cultures neg so far.  NG 4 days  Completed course of antibiotics    Abdominal wall abscess  Assessment & Plan  Patient presented to the ER with complaints of abdominal pain  Imaging done in the ER showed fluid collection concerning for abscess in the right anterior abdominal wall  Patient received a dose of IV Levaquin in the ER.   Discontinued IV Zosyn -->  cefepime and Flagyl as patient with multiple episodes of diarrhea which in part likely due to Abx use  Discontinue cefepime and Flagyl and change to vancomycin aspirate culture growing Enterococcus.  No anaerobic growth  Patient underwent aspiration of abdominal wall fluid collection and no drain was placed  IV Dilaudid as needed for pain management  Discussed with surgery  No need for discharge antibiotics, cultures of body fluid E faecalis  Zinc for bowel movements    Thrombocytosis-resolved as of 12/30/2023  Assessment & Plan  Likely reactive in nature.  Noted to have thrombocytosis in the past as well  Resolved on labwork.    Diarrhea-resolved as of 12/31/2023  Assessment & Plan  Patient presents with multiple episodes of diarrhea, nausea, vomiting. Family members at home sick as well with \"flu\" symptoms  Likely viral " gastroenteritis  Symptom management with IV fluids, IV Zofran as needed, Compazine as needed second line  N.p.o. --> full liquid diet with crackers and toast --> low fiber low residue today  C. difficile, bacterial enteric panel negative  Discussed with surgery given his abdominal surgeries and started on Lomotil as needed to which diarrhea has significantly improved        Medical Problems       Resolved Problems  Date Reviewed: 12/30/2023            Resolved    Electrolyte abnormality 12/31/2023     Resolved by  Kelly James MD    Diarrhea 12/31/2023     Resolved by  Kelly James MD    Thrombocytosis 12/30/2023     Resolved by  Darlin Martinez DO        Discharging Physician / Practitioner: Kelly James MD  PCP: Smooth Dietz DO  Admission Date:   Admission Orders (From admission, onward)       Ordered        12/28/23 1814  Inpatient Admission  Once            12/26/23 1648  Place in Observation  Once                          Discharge Date: 12/31/23      Consultations During Hospital Stay:  General surgery    Procedures Performed:   N/A    Significant Findings / Test Results:   N/A    Incidental Findings:   N/A    Test Results Pending at Discharge (will require follow up):   N/A     Outpatient Tests Requested:  N/A    Complications:  N/A        Hospital Course:   Nico Cruz is a 28 y.o. male patient who originally presented to the hospital on 12/26/2023 due to abdominal pain with nausea and vomiting and diarrhea.  Patient has underlying history of IBD-UC, recent surgical colostomy removal.  Patient presented with acute on chronic diarrhea, noted sick contacts at home with resolution of symptoms prior to discharge, with addition of l Lomotil.  Patient also had nausea and will be discharged with short course of Phenergan; and vomiting with resolution of symptoms prior to discharge will follow-up with specialists.  Patient met sepsis criteria noted to be secondary to abdominal wall abscess,  "completed course of antibiotics: Metronidazole, Zosyn, Levaquin and vancomycin which had susceptibility to body culture from abscess.  Patient seen by surgical team management.  Patient had acute on chronic abdominal pain, followed by outpatient provider for pain management congruent with PDMP.    Patient has chronic history of UC, and will follow-up with specialists and PCP for further management.      Please see above list of diagnoses and related plan for additional information.     Condition at Discharge: fair    Discharge Day Visit / Exam:   Subjective: Patient seen and examined at bedside, reported no diarrhea, pain improved since yesterday.  Patient reported feeling back at baseline considering everything with his surgeries.  Patient reported decreased nausea and stooling appropriately.    Vitals: Blood Pressure: 126/81 (12/31/23 1554)  Pulse: 67 (12/31/23 1554)  Temperature: 98.3 °F (36.8 °C) (12/31/23 1554)  Temp Source: Oral (12/31/23 1554)  Respirations: 18 (12/31/23 1554)  Height: 5' 9\" (175.3 cm) (12/29/23 1019)  Weight - Scale: 79.8 kg (176 lb) (12/29/23 1019)  SpO2: 95 % (12/31/23 1554)  Exam:   Physical Exam  Vitals and nursing note reviewed.   Constitutional:       General: He is not in acute distress.     Appearance: He is well-developed.   HENT:      Head: Normocephalic and atraumatic.   Eyes:      Conjunctiva/sclera: Conjunctivae normal.   Cardiovascular:      Rate and Rhythm: Normal rate and regular rhythm.      Heart sounds: No murmur heard.  Pulmonary:      Effort: Pulmonary effort is normal. No respiratory distress.      Breath sounds: Normal breath sounds.   Abdominal:      Palpations: Abdomen is soft.      Tenderness: There is no abdominal tenderness. There is no guarding or rebound.      Comments: Right periumbilical bandage CDI   Musculoskeletal:         General: No swelling.      Cervical back: Neck supple.   Skin:     General: Skin is warm and dry.      Capillary Refill: Capillary " refill takes less than 2 seconds.   Neurological:      Mental Status: He is alert.   Psychiatric:         Mood and Affect: Mood normal.          Discussion with Family: Patient declined call to .     Discharge instructions/Information to patient and family:   See after visit summary for information provided to patient and family.      Provisions for Follow-Up Care:  See after visit summary for information related to follow-up care and any pertinent home health orders.      Mobility at time of Discharge:   Basic Mobility Inpatient Raw Score: 24  JH-HLM Goal: 8: Walk 250 feet or more  JH-HLM Achieved: 8: Walk 250 feet ot more  HLM Goal achieved. Continue to encourage appropriate mobility.     Disposition:   Home    Planned Readmission: No     Discharge Statement:  I spent 35 minutes discharging the patient. This time was spent on the day of discharge. I had direct contact with the patient on the day of discharge. Greater than 50% of the total time was spent examining patient, answering all patient questions, arranging and discussing plan of care with patient as well as directly providing post-discharge instructions.  Additional time then spent on discharge activities.    Discharge Medications:  See after visit summary for reconciled discharge medications provided to patient and/or family.      **Please Note: This note may have been constructed using a voice recognition system**

## 2023-12-31 NOTE — PROGRESS NOTES
Nico Cruz is a 28 y.o. male who is currently ordered Vancomycin IV with management by the Pharmacy Consult service.  Relevant clinical data and objective / subjective history reviewed.  Vancomycin Assessment:  Indication and Goal AUC/Trough: Soft tissue (goal -600, trough >10)  Clinical Status: stable  Micro:     Renal Function:  SCr: 0.69 mg/dL  CrCl: 155.6 mL/min  Renal replacement: Not on dialysis  Days of Therapy: 2  Current Dose: 2,000 mg x1  Vancomycin Plan:  New Dosin,500 mg Q12H  Estimated AUC: 556 mcg*hr/mL  Estimated Trough: 16 mcg/mL  Next Level: 24 at 0600  Renal Function Monitoring: Daily BMP and UOP  Pharmacy will continue to follow closely for s/sx of nephrotoxicity, infusion reactions and appropriateness of therapy.  BMP and CBC will be ordered per protocol. We will continue to follow the patient’s culture results and clinical progress daily.    Swati Swan, Pharmacist

## 2023-12-31 NOTE — NURSING NOTE
Patient left unit AAOX4 accompanied by RN to main lobby. No signs of distress noted. PIV removed. All belongings returned. Discharge instructions given and patient verbalized understanding.

## 2023-12-31 NOTE — PLAN OF CARE
Problem: PAIN - ADULT  Goal: Verbalizes/displays adequate comfort level or baseline comfort level  Description: Interventions:  - Encourage patient to monitor pain and request assistance  - Assess pain using appropriate pain scale  - Administer analgesics based on type and severity of pain and evaluate response  - Implement non-pharmacological measures as appropriate and evaluate response  - Consider cultural and social influences on pain and pain management  - Notify physician/advanced practitioner if interventions unsuccessful or patient reports new pain  Outcome: Progressing     Problem: INFECTION - ADULT  Goal: Absence or prevention of progression during hospitalization  Description: INTERVENTIONS:  - Assess and monitor for signs and symptoms of infection  - Monitor lab/diagnostic results  - Monitor all insertion sites, i.e. indwelling lines, tubes, and drains  - Monitor endotracheal if appropriate and nasal secretions for changes in amount and color  - [place] appropriate cooling/warming therapies per order  - Administer medications as ordered  - Instruct and encourage patient and family to use good hand hygiene technique  - Identify and instruct in appropriate isolation precautions for identified infection/condition  Outcome: Progressing  Goal: Absence of fever/infection during neutropenic period  Description: INTERVENTIONS:  - Monitor WBC    Outcome: Progressing     Problem: SAFETY ADULT  Goal: Patient will remain free of falls  Description: INTERVENTIONS:  - Educate patient/family on patient safety including physical limitations  - Instruct patient to call for assistance with activity   - Consult OT/PT to assist with strengthening/mobility   - Keep Call bell within reach  - Keep bed low and locked with side rails adjusted as appropriate  - Keep care items and personal belongings within reach  - Initiate and maintain comfort rounds  - Make Fall Risk Sign visible to staff  - Offer Toileting every 2 Hours,  in advance of need  - Apply yellow socks and bracelet for high fall risk patients  - Consider moving patient to room near nurses station  Outcome: Progressing  Goal: Maintain or return to baseline ADL function  Description: INTERVENTIONS:  -  Assess patient's ability to carry out ADLs; assess patient's baseline for ADL function and identify physical deficits which impact ability to perform ADLs (bathing, care of mouth/teeth, toileting, grooming, dressing, etc.)  - Assess/evaluate cause of self-care deficits   - Assess range of motion  - Assess patient's mobility; develop plan if impaired  - Assess patient's need for assistive devices and provide as appropriate  - Encourage maximum independence but intervene and supervise when necessary  - Involve family in performance of ADLs  - Assess for home care needs following discharge   - Consider OT consult to assist with ADL evaluation and planning for discharge  - Provide patient education as appropriate  Outcome: Progressing  Goal: Maintains/Returns to pre admission functional level  Description: INTERVENTIONS:  - Perform AM-PAC 6 Click Basic Mobility/ Daily Activity assessment daily.  - Set and communicate daily mobility goal to care team and patient/family/caregiver.   - Collaborate with rehabilitation services on mobility goals if consulted  - Ambulate patient 3 times a day  - Out of bed to chair 3 times a day   - Out of bed for meals 3 times a day  - Out of bed for toileting  - Record patient progress and toleration of activity level   Outcome: Progressing     Problem: DISCHARGE PLANNING  Goal: Discharge to home or other facility with appropriate resources  Description: INTERVENTIONS:  - Identify barriers to discharge w/patient and caregiver  - Arrange for needed discharge resources and transportation as appropriate  - Identify discharge learning needs (meds, wound care, etc.)  - Refer to Case Management Department for coordinating discharge planning if the patient  needs post-hospital services based on physician/advanced practitioner order or complex needs related to functional status, cognitive ability, or social support system  Outcome: Progressing     Problem: Knowledge Deficit  Goal: Patient/family/caregiver demonstrates understanding of disease process, treatment plan, medications, and discharge instructions  Description: Complete learning assessment and assess knowledge base.  Interventions:  - Provide teaching at level of understanding  - Provide teaching via preferred learning methods  Outcome: Progressing

## 2024-01-01 NOTE — TELEPHONE ENCOUNTER
----- Message from Kelly James MD sent at 12/31/2023  5:43 PM EST -----  Thank you for allowing us to participate in the care of your patient, Nico Cruz, who was hospitalized from 12/26/2023 through 12/31/2023 with the admitting diagnosis of abdominal pain with nausea and vomiting and diarrhea.  Patient has underlying history of IBD-UC, recent surgical colostomy removal.  Patient presented with acute on chronic diarrhea, noted sick contacts at home with resolution of symptoms prior to discharge, with addition of l Lomotil.  Patient also had nausea and will be discharged with short course of Phenergan; and vomiting with resolution of symptoms prior to discharge will follow-up with specialists.  Patient met sepsis criteria noted to be secondary to abdominal wall abscess, completed course of antibiotics: Metronidazole, Zosyn, Levaquin and vancomycin which had susceptibility to body culture from abscess.  Patient seen by surgical team management.  Patient had acute on chronic abdominal pain, followed by outpatient provider for pain management congruent with PDMP.    Patient has chronic history of UC, and will follow-up with specialists and PCP for further management.        If you have any additional questions or would like to discuss further, please feel free to contact me.    Kelly James MD  [facility] Internal Medicine, Hospitalist  [phone]

## 2024-01-02 ENCOUNTER — TELEPHONE (OUTPATIENT)
Dept: FAMILY MEDICINE CLINIC | Facility: CLINIC | Age: 29
End: 2024-01-02

## 2024-01-02 ENCOUNTER — TRANSITIONAL CARE MANAGEMENT (OUTPATIENT)
Dept: FAMILY MEDICINE CLINIC | Facility: CLINIC | Age: 29
End: 2024-01-02

## 2024-01-03 NOTE — UTILIZATION REVIEW
NOTIFICATION OF ADMISSION DISCHARGE   This is a Notification of Discharge from [facility]. Please be advised that this patient has been discharge from our facility. Below you will find the admission and discharge date and time including the patient’s disposition.   UTILIZATION REVIEW CONTACT:  Yvette Ovetron  Utilization   Network Utilization Review Department  Phone: [phone] x carefully listen to the prompts. All voicemails are confidential.  Email: [email or web address]     ADMISSION INFORMATION  PRESENTATION DATE: 12/26/2023  1:31 PM  OBERVATION ADMISSION DATE:   INPATIENT ADMISSION DATE: 12/28/23  6:14 PM   DISCHARGE DATE: 12/31/2023  6:47 PM   DISPOSITION:Home/Self Care    Network Utilization Review Department  ATTENTION: Please call with any questions or concerns to [phone] and carefully listen to the prompts so that you are directed to the right person. All voicemails are confidential.   For Discharge needs, contact Care Management DC Support Team at [phone] opt. 2  Send all requests for admission clinical reviews, approved or denied determinations and any other requests to dedicated fax number below belonging to the campus where the patient is receiving treatment. List of dedicated fax numbers for the Facilities:  FACILITY NAME UR FAX NUMBER   ADMISSION DENIALS (Administrative/Medical Necessity) [phone]   DISCHARGE SUPPORT TEAM ([facility]) [phone]   PARENT CHILD HEALTH (Maternity/NICU/Pediatrics) [phone]   [facility] [phone]   [facility] [phone]   [facility] [phone]   [facility] [phone]   [facility] [phone]   [facility] [phone]   General acute hospital [phone]   [facility] [phone]    [facility] [phone]   [facility] [phone]   [facility] [phone]

## 2024-01-10 NOTE — UTILIZATION REVIEW
Initial Clinical Review    Observation 12/26/23 @ 1648 converted to inpatient admission 12/28/23 @ 1814 for continued care & tx for sepsis.    Admission: Date/Time/Statement:   Admission Orders (From admission, onward)       Ordered        12/28/23 1814  Inpatient Admission  Once            12/26/23 1648  Place in Observation  Once                          Orders Placed This Encounter   Procedures    Inpatient Admission     Standing Status:   Standing     Number of Occurrences:   1     Order Specific Question:   Level of Care     Answer:   Med Surg [16]     Order Specific Question:   Estimated length of stay     Answer:   More than 2 Midnights     Order Specific Question:   Certification     Answer:   I certify that inpatient services are medically necessary for this patient for a duration of greater than two midnights. See H&P and MD Progress Notes for additional information about the patient's course of treatment.     ED Arrival Information       Expected   -    Arrival   12/26/2023 12:53    Acuity   Urgent              Means of arrival   Walk-In    Escorted by   Family Member    Service   Hospitalist    Admission type   Emergency              Arrival complaint   vomiting             Chief Complaint   Patient presents with    Abdominal Pain     Pt started with lower abd pain and vomiting today. Recent abd surgery.        Initial Presentation:   28 yom to ER from home c/o vomiting, diarrhea, abd pain. Hx Ankylosing spondylitis, Cdiff, colitis, ileal pouchitis, pancreatitis, UC, bowel obstructions. Presents with diffuse abd/epigastric tenderness. Admission work-up showing fluid collection concerning for abscess in the right anterior abdominal wall on imaging, leukocytosis. Placed in observation status for sepsis 2nd abd wall abscess. Started on IVABT & IVF. Cultures pending.    12/27/23-observation:  IVABT in progress. IVF continued. Complaining of suprapubic abdominal pain which has resolved and now pain is more  towards the epigastric and both flank. Diarrhea improving. Using IV dilaudid for pain & IV Zofran for nausea. IVF maintained.     To IR for aspiration vs drain placement abd wall abscess.  Findings: Abdominal wall fluid collection aspiration performed. 10 cc fluid removed. Fluid was a yellow/green color with oily appearance similar to lipidol emulsion. Possible very small component of purulence. Collection aspirated to near completion and the decision to not place a drain was made.       Observation to IP admission 12/28/23  Diarrhea improving appetite still poor. On IV Levaquin for gastroenteritis and status post IR aspiration of abdominal wall fluid collection, cultures pending. C-diff pending. Continues to require IV Dilaudid for pain. IVF maintained.      Date: 12/29/23   Day 3: Has surpassed a 2nd midnight with active treatments and services, which include  Pt with multiple episodes of diarrhea, intermittent nausea. Decreased PO intake, He continues on IVF's. And IV Zosyn q6,   Lomotil x 1 , dilaudid prn x 5 today so far. Zofran iv x 1           ED Triage Vitals   Temperature Pulse Respirations Blood Pressure SpO2   12/26/23 1334 12/26/23 1334 12/26/23 1334 12/26/23 1334 12/26/23 1334   97.7 °F (36.5 °C) 98 20 123/68 100 %      Temp Source Heart Rate Source Patient Position - Orthostatic VS BP Location FiO2 (%)   12/26/23 1334 12/26/23 1334 12/26/23 1916 12/26/23 1916 --   Oral Monitor Lying Right arm       Pain Score       12/26/23 1334       8          Wt Readings from Last 1 Encounters:   12/29/23 79.8 kg (176 lb)     Additional Vital Signs:   Date/Time Temp Pulse Resp BP MAP (mmHg) SpO2 O2 Device Patient Position - Orthostatic VS   12/29/23 1019 98.7 °F (37.1 °C) 72 18 124/70 -- 99 % None (Room air) Sitting   12/28/23 2256 98 °F (36.7 °C) 64 17 113/71 85 98 % None (Room air) Lying   12/28/23 2027 98.4 °F (36.9 °C) 71 18 133/83 103 96 % None (Room air) Lying   12/28/23 1531 98 °F (36.7 °C) 71 18 118/69 88 96  % None (Room air) Lying   12/28/23 0913 97.9 °F (36.6 °C) 70 18 128/77 97 96 % None (Room air) Lying   12/27/23 2201 98.4 °F (36.9 °C) 78 17 120/78 98 98 % None (Room air) Lying   12/27/23 1500 98.5 °F (36.9 °C) 76 18 117/76 81 97 % None (Room air) Lying   12/27/23 14:53:42 -- 73 18 117/70 -- 99 % -- --   12/27/23 14:49:10 -- 79 20 110/64 -- 100 % -- --   12/27/23 14:44:17 -- 77 18 112/63 -- 100 % -- --   12/27/23 14:39:04 -- 84 20 110/60 -- 100 % -- --   12/27/23 14:34:15 -- 75 20 112/68 -- 98 % -- --   12/27/23 0745 -- 78 18 119/76 -- 95 % None (Room air) --         Date/Time Temp Pulse Resp BP MAP (mmHg) SpO2 O2 Device Patient Position - Orthostatic VS   12/26/23 2219 -- -- -- -- -- 94 % None (Room air) --   12/26/23 2218 98.3 °F (36.8 °C) -- 20 116/73 -- -- -- --   12/26/23 2015 -- 98 20 111/64 80 98 % None (Room air) Lying   12/26/23 1945 -- 98 20 108/62 79 97 % None (Room air) Lying   12/26/23 1916 -- 91 18 116/68 87 96 % None (Room air) Lying   12/26/23 1334 97.7 °F (36.5 °C) 98 20 123/68 -- 100 % None (Room air) --     Pertinent Labs/Diagnostic Test Results:   CT abdomen pelvis with contrast   Final Result  (12/26 1553)      Fluid collection again noted in the right anterior abdominal wall appears less amorphous and more formed with an enhancing wall suspicious for abscess currently measuring 3.9 x 3.8 cm on image 601/29.      Improvement in small bowel dilatation during the interval.      Likely reactive pelvic lymphadenopathy unchanged.                                                                                  ED Treatment:   Medication Administration from 12/26/2023 1253 to 12/26/2023 1067         Date/Time Order Dose Route Action     12/26/2023 1353 EST sodium chloride 0.9 % bolus 1,000 mL 1,000 mL Intravenous New Bag     12/26/2023 1351 EST ondansetron (ZOFRAN) injection 4 mg 4 mg Intravenous Given     12/26/2023 1403 EST HYDROmorphone (DILAUDID) injection 0.5 mg 0.5 mg Intravenous Given      12/26/2023 1518 EST iohexol (OMNIPAQUE) 350 MG/ML injection (MULTI-DOSE) 100 mL 100 mL Intravenous Given     12/26/2023 1527 EST HYDROmorphone (DILAUDID) injection 0.5 mg 0.5 mg Intravenous Given     12/26/2023 1529 EST ondansetron (ZOFRAN) injection 4 mg 4 mg Intravenous Given     12/26/2023 1626 EST sodium chloride 0.9 % bolus 1,000 mL 1,000 mL Intravenous New Bag     12/26/2023 1912 EST sodium chloride 0.9 % bolus 500 mL 500 mL Intravenous New Bag     12/26/2023 1648 EST levofloxacin (LEVAQUIN) IVPB (premix in dextrose) 500 mg 100 mL 500 mg Intravenous New Bag     12/26/2023 1656 EST ondansetron (ZOFRAN) injection 4 mg 4 mg Intravenous Given     12/26/2023 1810 EST HYDROmorphone (DILAUDID) injection 0.5 mg 0.5 mg Intravenous Given     12/26/2023 1837 EST promethazine (PHENERGAN) injection 12.5 mg 12.5 mg Intravenous Given     12/26/2023 1912 EST piperacillin-tazobactam (ZOSYN) IVPB 3.375 g 3.375 g Intravenous New Bag     12/26/2023 2000 EST sodium chloride 0.9 % infusion 125 mL/hr Intravenous New Bag     12/26/2023 2029 EST HYDROmorphone (DILAUDID) injection 0.5 mg 0.5 mg Intravenous Given          Past Medical History:   Diagnosis Date    Ankylosing spondylitis (HCC)     Anxiety     Bowel obstruction (HCC)     Clostridium difficile colitis 9/13/2018    Colitis     Ileal pouchitis (HCC) 9/13/2018    Pancreatitis     Ulcerative colitis (HCC)      Present on Admission:   Abdominal wall abscess   (Resolved) Diarrhea   Sepsis (HCC)   (Resolved) Electrolyte abnormality      Admitting Diagnosis: Nausea [R11.0]  Vomiting [R11.10]  Generalized abdominal pain [R10.84]  Abdominal wall abscess [L02.211]  Age/Sex: 28 y.o. male  Admission Orders:  Consult IR  Scd/foot pumps    Scheduled Medications:  Medications 12/19 12/20 12/21 12/22 12/23 12/24 12/25 12/26 12/27 12/28   enoxaparin (LOVENOX) subcutaneous injection 40 mg  Dose: 40 mg  Freq: Daily Route: SC  Start: 12/27/23 0900   Admin Instructions:               0909       0916        HYDROmorphone (DILAUDID) injection 0.5 mg  Dose: 0.5 mg  Freq: Once Route: IV  Start: 12/26/23 1815 End: 12/26/23 1810   Admin Instructions:              1810          HYDROmorphone (DILAUDID) injection 0.5 mg  Dose: 0.5 mg  Freq: Once Route: IV  Start: 12/26/23 1530 End: 12/26/23 1527   Admin Instructions:              1527          HYDROmorphone (DILAUDID) injection 0.5 mg  Dose: 0.5 mg  Freq: Once Route: IV  Start: 12/26/23 1400 End: 12/26/23 1403   Admin Instructions:              1403 [C]          levofloxacin (LEVAQUIN) IVPB (premix in dextrose) 500 mg 100 mL  Dose: 500 mg  Freq: Once Route: IV  Last Dose: Stopped (12/26/23 1748)  Start: 12/26/23 1645 End: 12/26/23 1748   Admin Instructions:      Order specific questions:              1648     1748          ondansetron (ZOFRAN) injection 4 mg  Dose: 4 mg  Freq: Once Route: IV  Start: 12/26/23 1700 End: 12/26/23 1656   Admin Instructions:              1656          ondansetron (ZOFRAN) injection 4 mg  Dose: 4 mg  Freq: Once Route: IV  Start: 12/26/23 1530 End: 12/26/23 1529   Admin Instructions:              1529          ondansetron (ZOFRAN) injection 4 mg  Dose: 4 mg  Freq: Once Route: IV  Start: 12/26/23 1330 End: 12/26/23 1351   Admin Instructions:              1351          piperacillin-tazobactam (ZOSYN) IVPB 3.375 g  Dose: 3.375 g  Freq: Every 6 hours Route: IV  Last Dose: 3.375 g (12/28/23 0602)  Start: 12/26/23 1845   Order specific questions:              1912     1942      0201     0729     1217     1816      0045     0602     1324     1845        promethazine (PHENERGAN) injection 12.5 mg  Dose: 12.5 mg  Freq: Once Route: IV  Start: 12/26/23 1830 End: 12/26/23 1837   Admin Instructions:              1837          sodium chloride 0.9 % bolus 1,000 mL  Dose: 1,000 mL  Freq: Once Route: IV  Last Dose: Stopped (12/26/23 1726)  Start: 12/26/23 1615 End: 12/26/23 1726 1626     1726          sodium chloride 0.9 % bolus 1,000 mL  Dose:  1,000 mL  Freq: Once Route: IV  Last Dose: Stopped (12/26/23 1638)  Start: 12/26/23 1330 End: 12/26/23 1638           1353     1638          sodium chloride 0.9 % bolus 500 mL  Dose: 500 mL  Freq: Once Route: IV  Last Dose: Stopped (12/26/23 2004)  Start: 12/26/23 1645 End: 12/26/23 2004 1912 2004          Legend:       [id number]/2112/2212/2312/2412/2512/2612/2712/28        Continuous Meds Sorted by Name  for Nico Cruz as of 12/28/23 1451  Legend:       Medications 12/19 12/20 12/21 12/22 12/23 12/24 12/25 12/26 12/27 12/28   sodium chloride 0.9 % infusion  Rate: 125 mL/hr Dose: 125 mL/hr  Freq: Continuous Route: IV  Last Dose: 125 mL/hr (12/28/23 0420)  Start: 12/26/23 1930 2000 0732     1816      0420                    PRN Meds Sorted by Name  for Nico Cruz as of 12/28/23 1451  Legend:         Medications 12/19 12/20 12/21 12/22 12/23 12/24 12/25 12/26 12/27 12/28   fentanyl citrate (PF) 100 MCG/2ML  Freq: As needed Route: IV  Start: 12/27/23 1437 End: 12/27/23 1437            1437         HYDROmorphone (DILAUDID) injection 0.2 mg  Dose: 0.2 mg  Freq: Every 3 hours PRN Route: IV  PRN Reason: moderate pain  Start: 12/26/23 1928   Admin Instructions:                   HYDROmorphone (DILAUDID) injection 0.5 mg  Dose: 0.5 mg  Freq: Every 3 hours PRN Route: IV  PRN Reason: severe pain  Start: 12/26/23 1928   Admin Instructions:              2029     2359      0322     0734     1130     1514     1816     2118      0045     0601     0915     1324        iohexol (OMNIPAQUE) 350 MG/ML injection (MULTI-DOSE) 100 mL  Dose: 100 mL  Freq: Once in imaging Route: IV  PRN Reason: contrast  Start: 12/26/23 1516 End: 12/26/23 1518           1518          midazolam (VERSED) injection  Freq: As needed  Start: 12/27/23 1437 End: 12/27/23 1437 1437         ondansetron (ZOFRAN) injection 4 mg  Dose: 4 mg  Freq: Every 6 hours PRN Route: IV  PRN Reasons:  nausea,vomiting  Start: 12/26/23 1928   Admin Instructions:               0000     1129     2149             Network Utilization Review Department  ATTENTION: Please call with any questions or concerns to [phone] and carefully listen to the prompts so that you are directed to the right person. All voicemails are confidential.   For Discharge needs, contact Care Management DC Support Team at [phone] opt. 2  Send all requests for admission clinical reviews, approved or denied determinations and any other requests to dedicated fax number below belonging to the [place] where the patient is receiving treatment. List of dedicated fax numbers for the Facilities:  FACILITY NAME UR FAX NUMBER   ADMISSION DENIALS (Administrative/Medical Necessity) [phone]   DISCHARGE SUPPORT TEAM ([facility]) [phone]   PARENT CHILD HEALTH (Maternity/NICU/Pediatrics) [phone]   [facility] [phone]   [facility] [phone]   [facility] [phone]   [facility] [phone]   [facility] [phone]   [facility] [phone]   [facility] [phone]   [facility] [phone]   [facility] [phone]   [facility] [phone]   [facility] [phone]           NOTIFICATION OF ADMISSION DISCHARGE   This is a Notification of Discharge from [facility]. Please be advised that this patient has been discharge from our facility. Below you will find the admission and discharge date and time including the patient’s disposition.   UTILIZATION REVIEW CONTACT:  Yvette Overton  Utilization   Network Utilization Review Department  Phone: [phone] x carefully listen to the prompts. All voicemails  are confidential.  Email: [email or web address]     ADMISSION INFORMATION  PRESENTATION DATE: 12/26/2023  1:31 PM  OBERVATION ADMISSION DATE:   INPATIENT ADMISSION DATE: 12/28/23  6:14 PM   DISCHARGE DATE: 12/31/2023  6:47 PM   DISPOSITION:Home/Self Care    Network Utilization Review Department  ATTENTION: Please call with any questions or concerns to [phone] and carefully listen to the prompts so that you are directed to the right person. All voicemails are confidential.   For Discharge needs, contact Care Management DC Support Team at [phone] opt. 2  Send all requests for admission clinical reviews, approved or denied determinations and any other requests to dedicated fax number below belonging to the campus where the patient is receiving treatment. List of dedicated fax numbers for the Facilities:  FACILITY NAME UR FAX NUMBER   ADMISSION DENIALS (Administrative/Medical Necessity) [phone]   DISCHARGE SUPPORT TEAM (Network) [phone]   PARENT CHILD HEALTH (Maternity/NICU/Pediatrics) [phone]   General acute hospital [phone]   [facility] [phone]   [facility] [phone]   [facility] [phone]   [facility] [phone]   [facility] [phone]   [facility] [phone]   [facility] [phone]   [facility] [phone]   [facility] [phone]   [facility] [phone]

## 2024-01-29 NOTE — PROGRESS NOTES
Diagnoses and all orders for this visit:    H/O major abdominal surgery  -     Ambulatory Referral to Colorectal Surgery    Complications of intestinal pouch (HCC)  -     Ambulatory Referral to Colorectal Surgery    History of ulcerative colitis  -     Ambulatory referral to Colorectal Surgery       Complications of intestinal pouch (HCC)  Patient presents for evaluation to establish care.  Briefly, patient has a history of ulcerative colitis.  This was treated medically until 2015.  At that point in time he underwent total proctocolectomy with ileal pouch anal anastomosis.  Postoperatively he has had complications of this.  He has had multiple revision procedures culminating in a redo of his pouch anal anastomosis last year at [facility].  He had an ileostomy at that time.  This was taken down and December.  Since then he is recovering nicely.  He wishes to establish himself with a local physician as needed.    We discussed that that secondary to the complexity of his case, it is reasonable colorectal surgeon available should he need care.  Specifically he notes he has had problems in the past with intermittent small bowel obstructions.  It would be reasonable if he has a small bowel obstruction for him to be under the care of colorectal surgeon.  We discussed that quite often the things are managed nonoperatively.  We should be able to manage this part locally.    We discussed that as he has had pouch complications and has had a redo pouch, if he needs pouch revision or something specific to his pouch, this is something more recently done at a quaternary center where reoperative pelvic surgery is common.  He notes he follows with endoscopy at [facility] and would like to continue his relationship with his endoscopist.  As such at this point in time I will see as needed.  We also discussed that if long-term follow-up is needed from a gastroenterology point of view to help assist with symptoms, I would recommend GI medicine for  further evaluation.  He will return as needed.      HPI    Past Medical History:   Diagnosis Date    Ankylosing spondylitis (HCC)     Anxiety     Bowel obstruction (HCC)     Clostridium difficile colitis 9/13/2018    Colitis     Ileal pouchitis (HCC) 9/13/2018    Pancreatitis     Ulcerative colitis (HCC)      Past Surgical History:   Procedure Laterality Date    COLECTOMY TOTAL      with ileal pouch and anastemosis    IR DRAINAGE TUBE PLACEMENT  12/27/2023    IR PICC PLACEMENT SINGLE LUMEN  3/1/2022    TOTAL COLECTOMY         Current Outpatient Medications:     diphenoxylate-atropine (LOMOTIL) 2.5-0.025 mg per tablet, Take 1 tablet by mouth 4 (four) times a day as needed for diarrhea for up to 10 doses, Disp: 10 tablet, Rfl: 0    prochlorperazine (COMPAZINE) 5 mg tablet, Take 1 tablet (5 mg total) by mouth every 6 (six) hours as needed for nausea or vomiting (Patient not taking: Reported on 1/31/2024), Disp: 12 tablet, Rfl: 0    zinc oxide 20 % ointment, Apply topically as needed for irritation (use with bowel movements) (Patient not taking: Reported on 1/31/2024), Disp: 28 g, Rfl: 0  Allergies as of 01/31/2024 - Reviewed 01/31/2024   Allergen Reaction Noted    Cefazolin Hives 03/06/2022    Mesalamine GI Intolerance 10/06/2016    Silver Rash 10/19/2022    Wound dressings Rash 10/19/2022     Review of Systems   All other systems reviewed and are negative.    There were no vitals filed for this visit.  Physical Exam  Constitutional:       Appearance: Normal appearance.   HENT:      Head: Normocephalic and atraumatic.   Eyes:      Extraocular Movements: Extraocular movements intact.      Pupils: Pupils are equal, round, and reactive to light.   Musculoskeletal:         General: Normal range of motion.   Skin:     General: Skin is warm and dry.      Coloration: Skin is not jaundiced.   Neurological:      General: No focal deficit present.      Mental Status: He is alert and oriented to person, place, and time.    Psychiatric:         Mood and Affect: Mood normal.         Behavior: Behavior normal.         Thought Content: Thought content normal.         Judgment: Judgment normal.

## 2024-01-31 ENCOUNTER — OFFICE VISIT (OUTPATIENT)
Age: 29
End: 2024-01-31
Payer: COMMERCIAL

## 2024-01-31 VITALS — BODY MASS INDEX: 26.07 KG/M2 | HEIGHT: 69 IN | WEIGHT: 176 LBS

## 2024-01-31 DIAGNOSIS — Z98.890 H/O MAJOR ABDOMINAL SURGERY: ICD-10-CM

## 2024-01-31 DIAGNOSIS — Z87.19 HISTORY OF ULCERATIVE COLITIS: ICD-10-CM

## 2024-01-31 DIAGNOSIS — K91.858: ICD-10-CM

## 2024-01-31 PROCEDURE — 99203 OFFICE O/P NEW LOW 30 MIN: CPT | Performed by: COLON & RECTAL SURGERY

## 2024-02-01 NOTE — ASSESSMENT & PLAN NOTE
Patient presents for evaluation to establish care.  Briefly, patient has a history of ulcerative colitis.  This was treated medically until 2015.  At that point in time he underwent total proctocolectomy with ileal pouch anal anastomosis.  Postoperatively he has had complications of this.  He has had multiple revision procedures culminating in a redo of his pouch anal anastomosis last year at [facility].  He had an ileostomy at that time.  This was taken down and December.  Since then he is recovering nicely.  He wishes to establish himself with a local physician as needed.    We discussed that that secondary to the complexity of his case, it is reasonable colorectal surgeon available should he need care.  Specifically he notes he has had problems in the past with intermittent small bowel obstructions.  It would be reasonable if he has a small bowel obstruction for him to be under the care of colorectal surgeon.  We discussed that quite often the things are managed nonoperatively.  We should be able to manage this part locally.    We discussed that as he has had pouch complications and has had a redo pouch, if he needs pouch revision or something specific to his pouch, this is something more recently done at a quaternary center where reoperative pelvic surgery is common.  He notes he follows with endoscopy at [facility] and would like to continue his relationship with his endoscopist.  As such at this point in time I will see as needed.  We also discussed that if long-term follow-up is needed from a gastroenterology point of view to help assist with symptoms, I would recommend GI medicine for further evaluation.  He will return as needed.

## 2024-02-09 ENCOUNTER — HOSPITAL ENCOUNTER (EMERGENCY)
Facility: HOSPITAL | Age: 29
Discharge: HOME | End: 2024-02-09
Attending: EMERGENCY MEDICINE
Payer: COMMERCIAL

## 2024-02-09 ENCOUNTER — APPOINTMENT (EMERGENCY)
Dept: RADIOLOGY | Facility: HOSPITAL | Age: 29
End: 2024-02-09
Payer: COMMERCIAL

## 2024-02-09 VITALS
HEART RATE: 91 BPM | OXYGEN SATURATION: 97 % | DIASTOLIC BLOOD PRESSURE: 98 MMHG | SYSTOLIC BLOOD PRESSURE: 136 MMHG | TEMPERATURE: 97.7 F | RESPIRATION RATE: 18 BRPM

## 2024-02-09 DIAGNOSIS — R10.9 ABDOMINAL PAIN: Primary | ICD-10-CM

## 2024-02-09 LAB
ALBUMIN SERPL BCP-MCNC: 4.7 G/DL (ref 3.5–5)
ALP SERPL-CCNC: 63 U/L (ref 34–104)
ALT SERPL W P-5'-P-CCNC: 18 U/L (ref 7–52)
ANION GAP SERPL CALCULATED.3IONS-SCNC: 9 MMOL/L
AST SERPL W P-5'-P-CCNC: 17 U/L (ref 13–39)
BASOPHILS # BLD AUTO: 0.1 THOUSANDS/ÂΜL (ref 0–0.1)
BASOPHILS NFR BLD AUTO: 1 % (ref 0–1)
BILIRUB SERPL-MCNC: 0.81 MG/DL (ref 0.2–1)
BUN SERPL-MCNC: 11 MG/DL (ref 5–25)
CALCIUM SERPL-MCNC: 9.3 MG/DL (ref 8.4–10.2)
CHLORIDE SERPL-SCNC: 105 MMOL/L (ref 96–108)
CO2 SERPL-SCNC: 24 MMOL/L (ref 21–32)
CREAT SERPL-MCNC: 0.98 MG/DL (ref 0.6–1.3)
EOSINOPHIL # BLD AUTO: 0.05 THOUSAND/ÂΜL (ref 0–0.61)
EOSINOPHIL NFR BLD AUTO: 1 % (ref 0–6)
ERYTHROCYTE [DISTWIDTH] IN BLOOD BY AUTOMATED COUNT: 18.4 % (ref 11.6–15.1)
GFR SERPL CREATININE-BSD FRML MDRD: 103 ML/MIN/1.73SQ M
GLUCOSE SERPL-MCNC: 91 MG/DL (ref 65–140)
HCT VFR BLD AUTO: 38.9 % (ref 36.5–49.3)
HGB BLD-MCNC: 11.7 G/DL (ref 12–17)
IMM GRANULOCYTES # BLD AUTO: 0.03 THOUSAND/UL (ref 0–0.2)
IMM GRANULOCYTES NFR BLD AUTO: 0 % (ref 0–2)
LIPASE SERPL-CCNC: 18 U/L (ref 11–82)
LYMPHOCYTES # BLD AUTO: 1.48 THOUSANDS/ÂΜL (ref 0.6–4.47)
LYMPHOCYTES NFR BLD AUTO: 17 % (ref 14–44)
MCH RBC QN AUTO: 19.1 PG (ref 26.8–34.3)
MCHC RBC AUTO-ENTMCNC: 30.1 G/DL (ref 31.4–37.4)
MCV RBC AUTO: 64 FL (ref 82–98)
MONOCYTES # BLD AUTO: 0.68 THOUSAND/ÂΜL (ref 0.17–1.22)
MONOCYTES NFR BLD AUTO: 8 % (ref 4–12)
NEUTROPHILS # BLD AUTO: 6.4 THOUSANDS/ÂΜL (ref 1.85–7.62)
NEUTS SEG NFR BLD AUTO: 73 % (ref 43–75)
NRBC BLD AUTO-RTO: 0 /100 WBCS
PLATELET # BLD AUTO: 420 THOUSANDS/UL (ref 149–390)
PMV BLD AUTO: 8.4 FL (ref 8.9–12.7)
POTASSIUM SERPL-SCNC: 3.9 MMOL/L (ref 3.5–5.3)
PROT SERPL-MCNC: 7.5 G/DL (ref 6.4–8.4)
RBC # BLD AUTO: 6.13 MILLION/UL (ref 3.88–5.62)
SODIUM SERPL-SCNC: 138 MMOL/L (ref 135–147)
WBC # BLD AUTO: 8.74 THOUSAND/UL (ref 4.31–10.16)

## 2024-02-09 PROCEDURE — 74177 CT ABD & PELVIS W/CONTRAST: CPT

## 2024-02-09 PROCEDURE — 96376 TX/PRO/DX INJ SAME DRUG ADON: CPT

## 2024-02-09 PROCEDURE — 96375 TX/PRO/DX INJ NEW DRUG ADDON: CPT

## 2024-02-09 PROCEDURE — G1004 CDSM NDSC: HCPCS

## 2024-02-09 PROCEDURE — 36415 COLL VENOUS BLD VENIPUNCTURE: CPT | Performed by: EMERGENCY MEDICINE

## 2024-02-09 PROCEDURE — 96374 THER/PROPH/DIAG INJ IV PUSH: CPT

## 2024-02-09 PROCEDURE — 83690 ASSAY OF LIPASE: CPT | Performed by: EMERGENCY MEDICINE

## 2024-02-09 PROCEDURE — 80053 COMPREHEN METABOLIC PANEL: CPT | Performed by: EMERGENCY MEDICINE

## 2024-02-09 PROCEDURE — 99285 EMERGENCY DEPT VISIT HI MDM: CPT | Performed by: EMERGENCY MEDICINE

## 2024-02-09 PROCEDURE — 85025 COMPLETE CBC W/AUTO DIFF WBC: CPT | Performed by: EMERGENCY MEDICINE

## 2024-02-09 PROCEDURE — 99284 EMERGENCY DEPT VISIT MOD MDM: CPT

## 2024-02-09 RX ORDER — HYDROMORPHONE HYDROCHLORIDE 2 MG/ML
2 INJECTION, SOLUTION INTRAMUSCULAR; INTRAVENOUS; SUBCUTANEOUS ONCE
Status: COMPLETED | OUTPATIENT
Start: 2024-02-09 | End: 2024-02-09

## 2024-02-09 RX ORDER — HYDROMORPHONE HCL/PF 1 MG/ML
1 SYRINGE (ML) INJECTION ONCE
Status: COMPLETED | OUTPATIENT
Start: 2024-02-09 | End: 2024-02-09

## 2024-02-09 RX ORDER — ONDANSETRON 2 MG/ML
4 INJECTION INTRAMUSCULAR; INTRAVENOUS ONCE
Status: COMPLETED | OUTPATIENT
Start: 2024-02-09 | End: 2024-02-09

## 2024-02-09 RX ORDER — DIPHENHYDRAMINE HYDROCHLORIDE 50 MG/ML
25 INJECTION INTRAMUSCULAR; INTRAVENOUS ONCE
Status: COMPLETED | OUTPATIENT
Start: 2024-02-09 | End: 2024-02-09

## 2024-02-09 RX ADMIN — HYDROMORPHONE HYDROCHLORIDE 2 MG: 2 INJECTION, SOLUTION INTRAMUSCULAR; INTRAVENOUS; SUBCUTANEOUS at 10:40

## 2024-02-09 RX ADMIN — HYDROMORPHONE HYDROCHLORIDE 1 MG: 1 INJECTION, SOLUTION INTRAMUSCULAR; INTRAVENOUS; SUBCUTANEOUS at 14:19

## 2024-02-09 RX ADMIN — HYDROMORPHONE HYDROCHLORIDE 2 MG: 2 INJECTION, SOLUTION INTRAMUSCULAR; INTRAVENOUS; SUBCUTANEOUS at 12:21

## 2024-02-09 RX ADMIN — ONDANSETRON 4 MG: 2 INJECTION INTRAMUSCULAR; INTRAVENOUS at 10:39

## 2024-02-09 RX ADMIN — IOHEXOL 100 ML: 350 INJECTION, SOLUTION INTRAVENOUS at 11:32

## 2024-02-09 RX ADMIN — DIPHENHYDRAMINE HYDROCHLORIDE 25 MG: 50 INJECTION, SOLUTION INTRAMUSCULAR; INTRAVENOUS at 14:20

## 2024-02-09 NOTE — ED PROVIDER NOTES
History  Chief Complaint   Patient presents with    Abdominal Pain     Pt reports of abd pain     Patient has known history of ulcerative colitis inflammatory bowel disease status post total colectomy almost 10 years ago.  Patient has had multiple abdominal surgeries and a complicated surgical course including multiple bouts of SBO, intra-abdominal abscesses.  He is being followed by colorectal surgery and recently has been doing well.  He started a new medication this week and has noted decreased bowel movements.  Patient was well until this morning at 8:00 when he experienced sudden onset of severe pain in the left upper quadrant.  He had some nausea without vomiting.  He has been passing gas and stool.  Patient states he does not think he is obstructed again but is unable to tolerate the pain        Prior to Admission Medications   Prescriptions Last Dose Informant Patient Reported? Taking?   diphenoxylate-atropine (LOMOTIL) 2.5-0.025 mg per tablet   No No   Sig: Take 1 tablet by mouth 4 (four) times a day as needed for diarrhea for up to 10 doses   prochlorperazine (COMPAZINE) 5 mg tablet   No No   Sig: Take 1 tablet (5 mg total) by mouth every 6 (six) hours as needed for nausea or vomiting   Patient not taking: Reported on 1/31/2024   zinc oxide 20 % ointment   No No   Sig: Apply topically as needed for irritation (use with bowel movements)   Patient not taking: Reported on 1/31/2024      Facility-Administered Medications: None       Past Medical History:   Diagnosis Date    Ankylosing spondylitis (HCC)     Anxiety     Bowel obstruction (HCC)     Clostridium difficile colitis 9/13/2018    Colitis     Ileal pouchitis (HCC) 9/13/2018    Pancreatitis     Ulcerative colitis (HCC)        Past Surgical History:   Procedure Laterality Date    COLECTOMY TOTAL      with ileal pouch and anastemosis    IR DRAINAGE TUBE PLACEMENT  12/27/2023    IR PICC PLACEMENT SINGLE LUMEN  3/1/2022    TOTAL COLECTOMY         Family  History   Problem Relation Age of Onset    Lung disease Neg Hx      I have reviewed and agree with the history as documented.    E-Cigarette/Vaping    E-Cigarette Use Never User      E-Cigarette/Vaping Substances    Nicotine No     THC No     CBD No     Flavoring No     Other No     Unknown No      Social History     Tobacco Use    Smoking status: Never    Smokeless tobacco: Never   Vaping Use    Vaping status: Never Used   Substance Use Topics    Alcohol use: Not Currently     Comment: pt states 5 a month/socially    Drug use: No       Review of Systems   Constitutional:  Negative for chills and fever.   HENT:  Negative for congestion.    Eyes:  Negative for visual disturbance.   Respiratory:  Negative for cough and shortness of breath.    Cardiovascular:  Negative for chest pain.   Gastrointestinal:  Positive for abdominal pain and nausea. Negative for abdominal distention, anal bleeding, blood in stool, constipation, diarrhea and vomiting.   Genitourinary:  Negative for dysuria.   Musculoskeletal:  Negative for back pain.   Skin:  Negative for color change and rash.   Neurological:  Negative for syncope and headaches.   Hematological:  Does not bruise/bleed easily.   Psychiatric/Behavioral:  Negative for confusion.    All other systems reviewed and are negative.      Physical Exam  Physical Exam  Vitals and nursing note reviewed.   Constitutional:       Appearance: He is well-developed.   HENT:      Head: Normocephalic.      Mouth/Throat:      Mouth: Mucous membranes are moist.   Eyes:      Extraocular Movements: Extraocular movements intact.   Cardiovascular:      Rate and Rhythm: Regular rhythm. Tachycardia present.      Heart sounds: Normal heart sounds.   Pulmonary:      Effort: Pulmonary effort is normal.   Abdominal:      General: Abdomen is flat. Bowel sounds are decreased.      Palpations: Abdomen is soft.      Tenderness: There is abdominal tenderness in the left upper quadrant. There is guarding.    Skin:     General: Skin is warm and dry.      Capillary Refill: Capillary refill takes less than 2 seconds.   Neurological:      General: No focal deficit present.      Mental Status: He is alert.   Psychiatric:         Mood and Affect: Mood is anxious.         Behavior: Behavior normal.         Vital Signs  ED Triage Vitals   Temperature Pulse Respirations Blood Pressure SpO2   02/09/24 1010 02/09/24 1010 02/09/24 1010 02/09/24 1010 02/09/24 1010   97.7 °F (36.5 °C) 101 20 (!) 171/132 99 %      Temp Source Heart Rate Source Patient Position - Orthostatic VS BP Location FiO2 (%)   02/09/24 1010 02/09/24 1010 02/09/24 1010 02/09/24 1010 --   Tympanic Monitor Standing Right arm       Pain Score       02/09/24 1040       10 - Worst Possible Pain           Vitals:    02/09/24 1010 02/09/24 1112 02/09/24 1422 02/09/24 1507   BP: (!) 171/132 154/88 130/94 136/98   Pulse: 101 98 94 91   Patient Position - Orthostatic VS: Standing Sitting Sitting Sitting         Visual Acuity      ED Medications  Medications   HYDROmorphone (DILAUDID) injection 2 mg (2 mg Intravenous Given 2/9/24 1040)   ondansetron (ZOFRAN) injection 4 mg (4 mg Intravenous Given 2/9/24 1039)   iohexol (OMNIPAQUE) 350 MG/ML injection (MULTI-DOSE) 100 mL (100 mL Intravenous Given 2/9/24 1132)   HYDROmorphone (DILAUDID) injection 2 mg (2 mg Intravenous Given 2/9/24 1221)   HYDROmorphone (DILAUDID) injection 1 mg (1 mg Intravenous Given 2/9/24 1419)   diphenhydrAMINE (BENADRYL) injection 25 mg (25 mg Intravenous Given 2/9/24 1420)       Diagnostic Studies  Results Reviewed       Procedure Component Value Units Date/Time    Comprehensive metabolic panel [[id number]] Collected: 02/09/24 1039    Lab Status: Final result Specimen: Blood from Arm, Left Updated: 02/09/24 1115     Sodium 138 mmol/L      Potassium 3.9 mmol/L      Chloride 105 mmol/L      CO2 24 mmol/L      ANION GAP 9 mmol/L      BUN 11 mg/dL      Creatinine 0.98 mg/dL      Glucose 91 mg/dL       Calcium 9.3 mg/dL      AST 17 U/L      ALT 18 U/L      Alkaline Phosphatase 63 U/L      Total Protein 7.5 g/dL      Albumin 4.7 g/dL      Total Bilirubin 0.81 mg/dL      eGFR 103 ml/min/1.73sq m     Narrative:      National Kidney Disease Foundation guidelines for Chronic Kidney Disease (CKD):     Stage 1 with normal or high GFR (GFR > 90 mL/min/1.73 square meters)    Stage 2 Mild CKD (GFR = 60-89 mL/min/1.73 square meters)    Stage 3A Moderate CKD (GFR = 45-59 mL/min/1.73 square meters)    Stage 3B Moderate CKD (GFR = 30-44 mL/min/1.73 square meters)    Stage 4 Severe CKD (GFR = 15-29 mL/min/1.73 square meters)    Stage 5 End Stage CKD (GFR <15 mL/min/1.73 square meters)  Note: GFR calculation is accurate only with a steady state creatinine    Lipase [[id number]]  (Normal) Collected: 02/09/24 1039    Lab Status: Final result Specimen: Blood from Arm, Left Updated: 02/09/24 1115     Lipase 18 u/L     CBC and differential [[id number]]  (Abnormal) Collected: 02/09/24 1039    Lab Status: Final result Specimen: Blood from Arm, Left Updated: 02/09/24 1050     WBC 8.74 Thousand/uL      RBC 6.13 Million/uL      Hemoglobin 11.7 g/dL      Hematocrit 38.9 %      MCV 64 fL      MCH 19.1 pg      MCHC 30.1 g/dL      RDW 18.4 %      MPV 8.4 fL      Platelets 420 Thousands/uL      nRBC 0 /100 WBCs      Neutrophils Relative 73 %      Immat GRANS % 0 %      Lymphocytes Relative 17 %      Monocytes Relative 8 %      Eosinophils Relative 1 %      Basophils Relative 1 %      Neutrophils Absolute 6.40 Thousands/µL      Immature Grans Absolute 0.03 Thousand/uL      Lymphocytes Absolute 1.48 Thousands/µL      Monocytes Absolute 0.68 Thousand/µL      Eosinophils Absolute 0.05 Thousand/µL      Basophils Absolute 0.10 Thousands/µL     UA (URINE) with reflex to Scope [[id number]]     Lab Status: No result Specimen: Urine                    CT abdomen pelvis with contrast   Final Result by Porfirio Daily MD (02/09 1325)      Stable  postoperative appearance without acute intra-abdominal abnormality identified.         Workstation performed: [id number]                    Procedures  Procedures         ED Course                               SBIRT 20yo+      Flowsheet Row Most Recent Value   Initial Alcohol Screen: US AUDIT-C     1. How often do you have a drink containing alcohol? 1 Filed at: 02/09/2024 1042   2. How many drinks containing alcohol do you have on a typical day you are drinking?  0 Filed at: 02/09/2024 1042   3a. Male UNDER 65: How often do you have five or more drinks on one occasion? 0 Filed at: 02/09/2024 1042   Audit-C Score 1 Filed at: 02/09/2024 1042   ROD: How many times in the past year have you...    Used an illegal drug or used a prescription medication for non-medical reasons? Never Filed at: 02/09/2024 1042                      Medical Decision Making  Patient has a complicated surgical history with onset of severe pain.  Will try to get the patient some pain relief CT scan the abdomen.  Last intra-abdominal abscess was drained approximately 6 weeks ago    Amount and/or Complexity of Data Reviewed  Labs: ordered.  Radiology: ordered.    Risk  Prescription drug management.             Disposition  Final diagnoses:   Abdominal pain     Time reflects when diagnosis was documented in both MDM as applicable and the Disposition within this note       Time User Action Codes Description Comment    2/9/2024  4:03 PM Les Busch Add [R10.9] Abdominal pain           ED Disposition       ED Disposition   Discharge    Condition   Stable    Date/Time   Fri Feb 9, 2024 1603    Comment   Nico Cruz discharge to home/self care.                   Follow-up Information       Follow up With Specialties Details Why Contact Sherri Dietz, DO Family Medicine Schedule an appointment as soon as possible for a visit   [address] [place] [address] [id number] [phone]              Patient's Medications    Discharge Prescriptions    No medications on file       No discharge procedures on file.    PDMP Review         Value Time User    PDMP Reviewed  Yes 12/31/2023  5:12 PM Kelly James MD            ED Provider  Electronically Signed by             Les Busch MD  02/09/24 [id number]

## 2024-02-21 ENCOUNTER — TELEPHONE (OUTPATIENT)
Age: 29
End: 2024-02-21

## 2024-02-21 PROBLEM — A41.9 SEPSIS (HCC): Status: RESOLVED | Noted: 2023-01-26 | Resolved: 2024-02-21

## 2024-02-21 NOTE — TELEPHONE ENCOUNTER
Patients provider:  Dr. Quevedo     Number to return call: [phone]    Reason for call: Pt calling because he has been under  care and he needs to be cleared for duty to return to work. He is also a Pt with endoscopy at [facility] but the surgeon he was seeing there has left. He is a  and they will not let him return to duty until he is deemed medically fit from his recent procedures and abdominal troubles so he is wondering if  would be able to perform that evaluation and clear him with an OV?  Please advise    Scheduled procedure/appointment date if applicable: NA

## 2024-02-22 ENCOUNTER — TELEPHONE (OUTPATIENT)
Age: 29
End: 2024-02-22

## 2024-02-22 NOTE — LETTER
Nico Cruz   [place] [address]        ------------------------------------------------------------------------------------------------------------      Nico Cruz is cleared to return to work with no restrictions on 3/1/2024.    If there are any questions you can call the office at [phone].              Thank you,    Navneet Quevedo MD

## 2024-02-27 ENCOUNTER — APPOINTMENT (OUTPATIENT)
Dept: RADIOLOGY | Facility: CLINIC | Age: 29
End: 2024-02-27
Payer: COMMERCIAL

## 2024-02-27 ENCOUNTER — OFFICE VISIT (OUTPATIENT)
Dept: URGENT CARE | Facility: CLINIC | Age: 29
End: 2024-02-27
Payer: COMMERCIAL

## 2024-02-27 ENCOUNTER — TELEPHONE (OUTPATIENT)
Age: 29
End: 2024-02-27

## 2024-02-27 VITALS
WEIGHT: 180 LBS | HEART RATE: 84 BPM | BODY MASS INDEX: 26.66 KG/M2 | HEIGHT: 69 IN | TEMPERATURE: 98.1 F | DIASTOLIC BLOOD PRESSURE: 80 MMHG | OXYGEN SATURATION: 98 % | RESPIRATION RATE: 18 BRPM | SYSTOLIC BLOOD PRESSURE: 130 MMHG

## 2024-02-27 DIAGNOSIS — M25.532 LEFT WRIST PAIN: ICD-10-CM

## 2024-02-27 DIAGNOSIS — M65.4 TENDINITIS, DE QUERVAIN'S: ICD-10-CM

## 2024-02-27 DIAGNOSIS — M25.532 LEFT WRIST PAIN: Primary | ICD-10-CM

## 2024-02-27 PROCEDURE — 99213 OFFICE O/P EST LOW 20 MIN: CPT | Performed by: PHYSICIAN ASSISTANT

## 2024-02-27 RX ORDER — PREDNISONE 10 MG/1
40 TABLET ORAL DAILY
Qty: 16 TABLET | Refills: 0 | Status: SHIPPED | OUTPATIENT
Start: 2024-02-27 | End: 2024-03-02

## 2024-02-27 NOTE — LETTER
February 27, 2024     Patient: Nico Cruz  YOB: 1995  Date of Visit: 2/27/2024      To Whom it May Concern:    Nico Cruz is under my professional care. Nico was seen in my office on 2/27/2024. Nico may return to work on 3/1/24 with no restrictions. HE will ice the wrist and rest it until then .    If you have any questions or concerns, please don't hesitate to call.         Sincerely,          She Bernard PA-C        CC: No Recipients

## 2024-02-27 NOTE — TELEPHONE ENCOUNTER
Patient is being referred to a orthopedics. Please schedule accordingly.    [facility] [place]   [phone]    
82

## 2024-02-27 NOTE — PATIENT INSTRUCTIONS
Tendinitis   WHAT YOU NEED TO KNOW:   Tendinitis is painful inflammation or breakdown of your tendons. It may also be called tendinopathy. Tendinitis often occurs in the knee, shoulder, ankle, hip, or elbow.  DISCHARGE INSTRUCTIONS:   Return to the emergency department if:   You have increased redness over the joint, or swelling in the joint.    You suddenly cannot move your joint.    Call your doctor if:   You have increased pain even after you take medicine.    You have questions or concerns about your condition or care.    Medicines:   Acetaminophen  decreases pain and fever. It is available without a doctor's order. Ask how much to take and how often to take it. Follow directions. Read the labels of all other medicines you are using to see if they also contain acetaminophen, or ask your doctor or pharmacist. Acetaminophen can cause liver damage if not taken correctly.    NSAIDs , such as ibuprofen, help decrease swelling, pain, and fever. This medicine is available with or without a doctor's order. NSAIDs can cause stomach bleeding or kidney problems in certain people. If you take blood thinner medicine, always ask your healthcare provider if NSAIDs are safe for you. Always read the medicine label and follow directions.    Take your medicine as directed.  Contact your healthcare provider if you think your medicine is not helping or if you have side effects. Tell your provider if you are allergic to any medicine. Keep a list of the medicines, vitamins, and herbs you take. Include the amounts, and when and why you take them. Bring the list or the pill bottles to follow-up visits. Carry your medicine list with you in case of an emergency.    Manage tendinitis:   Rest your tendon  as directed to help it heal. Ask your healthcare provider if you need to stop putting weight on your affected area.    Apply ice  to help decrease swelling and pain. Use an ice pack, or put crushed ice in a plastic bag. Cover the bag with  a towel before you place it on the affected area. Apply ice for 10 to 15 minutes every hour, or as directed.    Use a support device , such as a cane, splint, shoe insert, or brace. A support device may help reduce your pain.    Go to physical therapy  if directed. A physical therapist can teach you exercises to help improve movement and strength, and to decrease pain. You may also learn how to improve your posture, and how to lift or exercise correctly.    Prevent tendinitis:   Warm up and cool down when you exercise.  Run in place slowly or walk at a brisk pace to warm your muscles before you exercise. When you finish exercising, walk for a few minutes to cool down.         Exercise regularly  to strengthen the muscles around your joint. Ease into an exercise routine for the first 3 weeks to prevent another injury. Ask your healthcare provider about the best exercise plan for you. Rest fully between activities.    Use the right equipment  for sports and exercise. Wear braces or tape around weak joints as directed.    Follow up with your doctor as directed:  Write down your questions so you remember to ask them during your visits.  © Copyright [employer] 2023 Information is for End User's use only and may not be sold, redistributed or otherwise used for commercial purposes.  The above information is an  only. It is not intended as medical advice for individual conditions or treatments. Talk to your doctor, nurse or pharmacist before following any medical regimen to see if it is safe and effective for you.

## 2024-02-28 DIAGNOSIS — R11.2 NAUSEA VOMITING AND DIARRHEA: ICD-10-CM

## 2024-02-28 DIAGNOSIS — R19.7 NAUSEA VOMITING AND DIARRHEA: ICD-10-CM

## 2024-02-28 RX ORDER — PROCHLORPERAZINE MALEATE 5 MG/1
5 TABLET ORAL EVERY 6 HOURS PRN
Qty: 90 TABLET | Refills: 1 | Status: SHIPPED | OUTPATIENT
Start: 2024-02-28

## 2024-02-28 NOTE — TELEPHONE ENCOUNTER
Reason for call:   [x] Refill   [] Prior Auth  [] Other:     Office:   [x] PCP/Provider -   [] Specialty/Provider -     Medication: compazine 5 mg, one tab every 6 hours prn      Pharmacy: [facility]     Does the patient have enough for 3 days?   [] Yes   [x] No - Send as HP to POD

## 2024-04-01 ENCOUNTER — TELEPHONE (OUTPATIENT)
Dept: OBGYN CLINIC | Facility: CLINIC | Age: 29
End: 2024-04-01

## 2024-04-01 NOTE — TELEPHONE ENCOUNTER
LM to call back to r/s appt. Dr. Smith has emergency in OR. Please schedule at next availability.

## 2024-04-09 ENCOUNTER — OFFICE VISIT (OUTPATIENT)
Dept: OBGYN CLINIC | Facility: CLINIC | Age: 29
End: 2024-04-09
Payer: COMMERCIAL

## 2024-04-09 VITALS
HEIGHT: 69 IN | HEART RATE: 80 BPM | WEIGHT: 147.6 LBS | SYSTOLIC BLOOD PRESSURE: 122 MMHG | BODY MASS INDEX: 21.86 KG/M2 | DIASTOLIC BLOOD PRESSURE: 82 MMHG

## 2024-04-09 DIAGNOSIS — M77.8 LEFT WRIST TENDONITIS: Primary | ICD-10-CM

## 2024-04-09 DIAGNOSIS — S69.92XA INJURY OF LEFT WRIST, INITIAL ENCOUNTER: ICD-10-CM

## 2024-04-09 DIAGNOSIS — M25.532 LEFT WRIST PAIN: ICD-10-CM

## 2024-04-09 PROCEDURE — 99203 OFFICE O/P NEW LOW 30 MIN: CPT | Performed by: STUDENT IN AN ORGANIZED HEALTH CARE EDUCATION/TRAINING PROGRAM

## 2024-04-09 NOTE — PROGRESS NOTES
ASSESSMENT/PLAN:    Assessment:   Left wrist injury sustained 2/24/2024  Left wrist tendonitis     Plan:   The etiology of the above diagnosis along with treatment options were discussed with patient, all his question were addressed  Referral to occupational therapy provided   NSAIDS as needed  Activities as tolerated     Follow Up:  6 weeks    To Do Next Visit:  Repeat evaluation     _____________________________________________________  CHIEF COMPLAINT:  Chief Complaint   Patient presents with   • Left Wrist - Pain         SUBJECTIVE:  Nico Cruz is a 28 y.o. male who presents for initial evaluation of left wrist. Patient injured his wrist trying to catch a display that was falling at [employer] 2/24/2024. He was seen at Urgent Care 2/27/2024, x-rays were obtained demonstrating no acute osseous abnormalities. Patient notes significant intermittent pain to the left wrist that causes immobility and loss of  strength. He describes a seizing up sensation diffuse to his hand. Denies numbness or paresthesias.     Radiation: Yes to the  forearm  Previous Treatments: NSAIDs with only partial relief  Associated symptoms: Stiffness/LROM  Handedness: right  Work status:      PAST MEDICAL HISTORY:  Past Medical History:   Diagnosis Date   • Ankylosing spondylitis (HCC)    • Anxiety    • Bowel obstruction (HCC)    • Clostridium difficile colitis 9/13/2018   • Colitis    • Ileal pouchitis (HCC) 9/13/2018   • Pancreatitis    • Ulcerative colitis (HCC)        PAST SURGICAL HISTORY:  Past Surgical History:   Procedure Laterality Date   • COLECTOMY TOTAL      with ileal pouch and anastemosis   • IR DRAINAGE TUBE PLACEMENT  12/27/2023   • IR PICC PLACEMENT SINGLE LUMEN  3/1/2022   • TOTAL COLECTOMY         FAMILY HISTORY:  Family History   Problem Relation Age of Onset   • Lung disease Neg Hx        SOCIAL HISTORY:  Social History     Tobacco Use   • Smoking status: Never   • Smokeless tobacco: Never   Vaping Use  "  • Vaping status: Never Used   Substance Use Topics   • Alcohol use: Not Currently   • Drug use: No       MEDICATIONS:    Current Outpatient Medications:   •  diphenoxylate-atropine (LOMOTIL) 2.5-0.025 mg per tablet, Take 1 tablet by mouth 4 (four) times a day as needed for diarrhea for up to 10 doses, Disp: 10 tablet, Rfl: 0  •  prochlorperazine (COMPAZINE) 5 mg tablet, Take 1 tablet (5 mg total) by mouth every 6 (six) hours as needed for nausea or vomiting, Disp: 90 tablet, Rfl: 1    ALLERGIES:  Allergies   Allergen Reactions   • Cefazolin Hives     Other reaction(s): Arrythmia (Abnormal Heartbeat), Rash Maculopapular   • Mesalamine GI Intolerance     Other reaction(s): Pancreatitis  Annotation - 06Oct2016: pancreatitis   • Silver Rash     PT REPORTS NO REACTION    • Wound Dressings Rash     Coloplast ostomy Products        REVIEW OF SYSTEMS:  Pertinent items are noted in HPI.  A comprehensive review of systems was negative.    LABS:  HgA1c: No results found for: \"HGBA1C\"  BMP:   Lab Results   Component Value Date    GLUCOSE 77 10/31/2015    CALCIUM 9.3 02/09/2024     10/31/2015    K 3.9 02/09/2024    CO2 24 02/09/2024     02/09/2024    BUN 11 02/09/2024    CREATININE 0.98 02/09/2024         _____________________________________________________  PHYSICAL EXAMINATION:  Vital signs: /82   Pulse 80   Ht 5' 9\" (1.753 m)   Wt 67 kg (147 lb 9.6 oz)   BMI 21.80 kg/m²   General: well developed and well nourished, alert, oriented times 3, and appears comfortable  Psychiatric: Normal  HEENT: Trachea Midline, No torticollis  Cardiovascular: No discernable arrhythmia  Pulmonary: No wheezing or stridor  Abdomen: No rebound or guarding  Extremities: No peripheral edema  Skin: No masses, erythema, lacerations, fluctation, ulcerations  Neurovascular: Sensation Intact to the Median, Ulnar, Radial Nerve, Motor Intact to the Median, Ulnar, Radial Nerve, and Pulses Intact    MUSCULOSKELETAL EXAMINATION:  left " wrist -  Patient presents with no obvious anatomical deformity  Skin is warm and dry to touch with no signs of erythema, ecchymosis, infection  TTP over crossover of first and second extensor compartment, dorsal forearm   Pain with resisted wrist flexion but not extension   MMT: 5/5 throughout  AROM Extension to 70 degrees, same as contra lateral side  80 degrees of flexion   No soft tissue swelling or effusion noted  Full FDS, FDP, extensor mechanisms are intact  (-) Finklestein  (-) Ulnar / Radial impaction   (-) Porter's  NTTP over scaphoid tubercle, snuffbox, SL interval or lunate   Demonstrates normal elbow, and shoulder motion  Forearm compartments are soft and supple  2+ Distal radial pulse with brisk capillary refill to the fingers  Radial, median, ulnar motor and sensory distribution intact  Sensation to light touch intact distally      _____________________________________________________  STUDIES REVIEWED:  Images were reviewed in PACS by Dr. Smith and demonstrate: no acute osseous abnormalities.       PROCEDURES PERFORMED:  Procedures  No Procedures performed today    Scribe Attestation    I,:  Susy Curtis am acting as a scribe while in the presence of the attending physician.:       I,:  Shruthi Smith MD personally performed the services described in this documentation    as scribed in my presence.:

## 2024-05-24 ENCOUNTER — HOSPITAL ENCOUNTER (INPATIENT)
Facility: HOSPITAL | Age: 29
LOS: 1 days | Discharge: HOME | DRG: 389 | End: 2024-05-26
Attending: EMERGENCY MEDICINE | Admitting: INTERNAL MEDICINE
Payer: COMMERCIAL

## 2024-05-24 ENCOUNTER — APPOINTMENT (EMERGENCY)
Dept: RADIOLOGY | Facility: HOSPITAL | Age: 29
DRG: 389 | End: 2024-05-24
Payer: COMMERCIAL

## 2024-05-24 DIAGNOSIS — K51.90 ULCERATIVE COLITIS (HCC): ICD-10-CM

## 2024-05-24 DIAGNOSIS — K56.41 FECAL IMPACTION (HCC): Primary | ICD-10-CM

## 2024-05-24 DIAGNOSIS — R10.9 PAIN IN THE ABDOMEN: ICD-10-CM

## 2024-05-24 LAB
ALBUMIN SERPL BCP-MCNC: 5 G/DL (ref 3.5–5)
ALP SERPL-CCNC: 87 U/L (ref 34–104)
ALT SERPL W P-5'-P-CCNC: 19 U/L (ref 7–52)
ANION GAP SERPL CALCULATED.3IONS-SCNC: 11 MMOL/L (ref 4–13)
AST SERPL W P-5'-P-CCNC: 15 U/L (ref 13–39)
BASOPHILS # BLD AUTO: 0.12 THOUSANDS/ÂΜL (ref 0–0.1)
BASOPHILS NFR BLD AUTO: 1 % (ref 0–1)
BILIRUB SERPL-MCNC: 0.89 MG/DL (ref 0.2–1)
BUN SERPL-MCNC: 9 MG/DL (ref 5–25)
CALCIUM SERPL-MCNC: 10.3 MG/DL (ref 8.4–10.2)
CHLORIDE SERPL-SCNC: 102 MMOL/L (ref 96–108)
CO2 SERPL-SCNC: 22 MMOL/L (ref 21–32)
CREAT SERPL-MCNC: 1 MG/DL (ref 0.6–1.3)
EOSINOPHIL # BLD AUTO: 0.09 THOUSAND/ÂΜL (ref 0–0.61)
EOSINOPHIL NFR BLD AUTO: 1 % (ref 0–6)
ERYTHROCYTE [DISTWIDTH] IN BLOOD BY AUTOMATED COUNT: 18 % (ref 11.6–15.1)
GFR SERPL CREATININE-BSD FRML MDRD: 99 ML/MIN/1.73SQ M
GLUCOSE SERPL-MCNC: 100 MG/DL (ref 65–140)
HCT VFR BLD AUTO: 40.1 % (ref 36.5–49.3)
HGB BLD-MCNC: 12.1 G/DL (ref 12–17)
IMM GRANULOCYTES # BLD AUTO: 0.05 THOUSAND/UL (ref 0–0.2)
IMM GRANULOCYTES NFR BLD AUTO: 0 % (ref 0–2)
LACTATE SERPL-SCNC: 1.6 MMOL/L (ref 0.5–2)
LIPASE SERPL-CCNC: 17 U/L (ref 11–82)
LYMPHOCYTES # BLD AUTO: 1.55 THOUSANDS/ÂΜL (ref 0.6–4.47)
LYMPHOCYTES NFR BLD AUTO: 12 % (ref 14–44)
MCH RBC QN AUTO: 18.6 PG (ref 26.8–34.3)
MCHC RBC AUTO-ENTMCNC: 30.2 G/DL (ref 31.4–37.4)
MCV RBC AUTO: 62 FL (ref 82–98)
MONOCYTES # BLD AUTO: 1.12 THOUSAND/ÂΜL (ref 0.17–1.22)
MONOCYTES NFR BLD AUTO: 8 % (ref 4–12)
NEUTROPHILS # BLD AUTO: 10.43 THOUSANDS/ÂΜL (ref 1.85–7.62)
NEUTS SEG NFR BLD AUTO: 78 % (ref 43–75)
NRBC BLD AUTO-RTO: 0 /100 WBCS
PLATELET # BLD AUTO: 402 THOUSANDS/UL (ref 149–390)
PMV BLD AUTO: 8.3 FL (ref 8.9–12.7)
POTASSIUM SERPL-SCNC: 3.6 MMOL/L (ref 3.5–5.3)
PROT SERPL-MCNC: 8.2 G/DL (ref 6.4–8.4)
RBC # BLD AUTO: 6.52 MILLION/UL (ref 3.88–5.62)
SODIUM SERPL-SCNC: 135 MMOL/L (ref 135–147)
WBC # BLD AUTO: 13.36 THOUSAND/UL (ref 4.31–10.16)

## 2024-05-24 PROCEDURE — 96361 HYDRATE IV INFUSION ADD-ON: CPT

## 2024-05-24 PROCEDURE — 85025 COMPLETE CBC W/AUTO DIFF WBC: CPT | Performed by: EMERGENCY MEDICINE

## 2024-05-24 PROCEDURE — 96375 TX/PRO/DX INJ NEW DRUG ADDON: CPT

## 2024-05-24 PROCEDURE — 80053 COMPREHEN METABOLIC PANEL: CPT | Performed by: EMERGENCY MEDICINE

## 2024-05-24 PROCEDURE — 83605 ASSAY OF LACTIC ACID: CPT | Performed by: EMERGENCY MEDICINE

## 2024-05-24 PROCEDURE — 99285 EMERGENCY DEPT VISIT HI MDM: CPT

## 2024-05-24 PROCEDURE — 83690 ASSAY OF LIPASE: CPT | Performed by: EMERGENCY MEDICINE

## 2024-05-24 PROCEDURE — 74177 CT ABD & PELVIS W/CONTRAST: CPT

## 2024-05-24 PROCEDURE — 36415 COLL VENOUS BLD VENIPUNCTURE: CPT | Performed by: EMERGENCY MEDICINE

## 2024-05-24 PROCEDURE — 96374 THER/PROPH/DIAG INJ IV PUSH: CPT

## 2024-05-24 PROCEDURE — 99285 EMERGENCY DEPT VISIT HI MDM: CPT | Performed by: EMERGENCY MEDICINE

## 2024-05-24 RX ORDER — HYDROMORPHONE HCL/PF 1 MG/ML
1 SYRINGE (ML) INJECTION ONCE
Status: COMPLETED | OUTPATIENT
Start: 2024-05-24 | End: 2024-05-24

## 2024-05-24 RX ORDER — ONDANSETRON 2 MG/ML
4 INJECTION INTRAMUSCULAR; INTRAVENOUS ONCE
Status: COMPLETED | OUTPATIENT
Start: 2024-05-24 | End: 2024-05-24

## 2024-05-24 RX ADMIN — ONDANSETRON 4 MG: 2 INJECTION INTRAMUSCULAR; INTRAVENOUS at 23:29

## 2024-05-24 RX ADMIN — HYDROMORPHONE HYDROCHLORIDE 1 MG: 1 INJECTION, SOLUTION INTRAMUSCULAR; INTRAVENOUS; SUBCUTANEOUS at 23:46

## 2024-05-24 RX ADMIN — SODIUM CHLORIDE 1000 ML: 0.9 INJECTION, SOLUTION INTRAVENOUS at 23:30

## 2024-05-24 RX ADMIN — HYDROMORPHONE HYDROCHLORIDE 1 MG: 1 INJECTION, SOLUTION INTRAMUSCULAR; INTRAVENOUS; SUBCUTANEOUS at 23:30

## 2024-05-24 RX ADMIN — IOHEXOL 100 ML: 350 INJECTION, SOLUTION INTRAVENOUS at 23:59

## 2024-05-24 NOTE — ED NOTES
Patient transported to Reena Edgar RN  08/21/22 5095 Preop Patient Instructions for [facility] Surgeries and Procedures    Welcome! We want you to have the best experience! Thank you for choosing us and trusting us with your care. If you have any questions regarding your preop instructions, please call:  [phone] 7:30am-4pm M-F  IF you have questions day before surgery before 8:30pm or morning of surgery after 5am THEN call [phone]   Call your surgeon immediately if you feel that you cannot make it for surgery for any reason (i.e. cold symptoms, fever, family emergency etc.).    WHEN SHOULD I ARRIVE?    Your hospital arrival time will be given the afternoon of the business day prior to your surgical date, via text message or phone call after 4:00 PM. Once you accept the text, we will know you received your arrival time. If your surgery falls the day after a holiday or on a Monday, you will be notified the prior business day.     WHERE DO I GO?    When driving, follow the signs to the Main Entrance, and then follow signs for Parking lot A, which is located to the right of the Main Entrance if you are facing the building.   is available M-F 8-4pm. Walk through the main entrance on the ground level. Then, walk to the East elevators, which are located past the front lobby desk on the right. The [facility] is located up one level on the first floor. Exit the elevator and the reception desk is to your right.    TRANSPORTATION    IMPORTANT SAFETY! You must have a ride arranged to get home and have a responsible adult stay with you for 24 hours after the surgery. You cannot drive or use public transportation or rideshare by yourself.    CAN I HAVE VISITORS?    Yes, two visitors are allowed in the [facility] due to space limitations. Additional visitors would be directed to the waiting area. More visitors may be allowed in the overnight units during visitor hours. Minor visitors must be accompanied by an adult at all times.  Masking  is optional for patients, visitors, and the clinical team with exceptions for higher risk patients.  The clinical team has discretion to limit visitors if a visitor presents a health or safety risk to the patient, themselves, or the care team.   Visitor accommodations are subject to change depending on community infection concerns.      WHAT SHOULD I BRING?    Photo ID and insurance card  Do not bring valuables to the hospital  Bring any asthma/COPD inhalers, eyeglasses, dentures or hearing aids (bring cases)  Home medication list    IF you are staying overnight bring:  CPAP machine - if you use one for sleep apnea.  Self-care items such as hairbrush or hair tie.    HOW TO DRESS, SHOWER OR BATHE THE NIGHT BEFORE SURGERY?    Standard instructions  Please shower the night before or the morning of surgery. No lotions, deodorant, cologne, perfumes or other skin products. Do not wear jewelry, this also applies to permanent jewelry. If it is not removed it could result in a burn, or the cancellation of your surgery.   Do not wear makeup, including mascara.    Please remove any nail polish including toes.  Come in loose, clean comfortable clothes. Wear sturdy shoes, please no flip-flops or clogs.  Please brush and floss the night before and morning of your procedure with a new toothbrush.    WHAT CAN I EAT BEFORE I ARRIVE FOR SURGERY?    8 hours prior to ARRIVAL time: Only if allowed by your surgeon, you can eat a full meal. No solid foods after this.     2 hours before ARRIVAL time: You may drink approved clear liquids up to 2 hours before your arrival time.     Approved Clear liquids: Water, Propel, Gatorade (any color except red), Apple juice without pulp (non-organic); Pedialyte, 7-up/ Sprite, PLAIN Black coffee or tea without milk products or lemon. NO NON-DAIRY CREAMERS *If you have diabetes choose low carb/sugar or no carb/sugar options.     Unacceptable Clear liquids: Coffee or tea with milk products, orange juice,  alcohol, soup broth, powder flavoring packets     IMPORTANT SAFETY! FAILURE TO FOLLOW THIS MAY RESULT IN EITHER DELAY OR CANCELLATION OF YOUR PROCEDURE DUE TO THE RISK OF ASPIRATION.    WHEN SHOULD I STOP TAKING MY MEDICATIONS?    Before Surgery Hold (Do Not Take) these Medications  Medications and Supplements:  - Morning of surgery hold any Vitamins AND for 2 weeks prior hold any Vitamin E or Herbals     Anticoagulation:none on med list    Antidiabetic:none on med list    NURA Risk (Diuretics, ACE-I/ARB, NSAIDs):none on med list      Continue all other medications unless an alternative plan was advised with the surgeon and/or specialist.

## 2024-05-25 PROBLEM — K56.41 FECAL IMPACTION (HCC): Status: ACTIVE | Noted: 2024-05-25

## 2024-05-25 PROBLEM — R65.10 SIRS (SYSTEMIC INFLAMMATORY RESPONSE SYNDROME) (HCC): Status: ACTIVE | Noted: 2024-05-25

## 2024-05-25 LAB
ANION GAP SERPL CALCULATED.3IONS-SCNC: 5 MMOL/L (ref 4–13)
BASOPHILS # BLD AUTO: 0.08 THOUSANDS/ÂΜL (ref 0–0.1)
BASOPHILS NFR BLD AUTO: 1 % (ref 0–1)
BUN SERPL-MCNC: 9 MG/DL (ref 5–25)
CALCIUM SERPL-MCNC: 9.1 MG/DL (ref 8.4–10.2)
CHLORIDE SERPL-SCNC: 103 MMOL/L (ref 96–108)
CO2 SERPL-SCNC: 26 MMOL/L (ref 21–32)
CREAT SERPL-MCNC: 0.91 MG/DL (ref 0.6–1.3)
EOSINOPHIL # BLD AUTO: 0.07 THOUSAND/ÂΜL (ref 0–0.61)
EOSINOPHIL NFR BLD AUTO: 1 % (ref 0–6)
ERYTHROCYTE [DISTWIDTH] IN BLOOD BY AUTOMATED COUNT: 16 % (ref 11.6–15.1)
GFR SERPL CREATININE-BSD FRML MDRD: 111 ML/MIN/1.73SQ M
GLUCOSE SERPL-MCNC: 93 MG/DL (ref 65–140)
HCT VFR BLD AUTO: 35.1 % (ref 36.5–49.3)
HGB BLD-MCNC: 10.4 G/DL (ref 12–17)
IMM GRANULOCYTES # BLD AUTO: 0.04 THOUSAND/UL (ref 0–0.2)
IMM GRANULOCYTES NFR BLD AUTO: 0 % (ref 0–2)
LYMPHOCYTES # BLD AUTO: 1.42 THOUSANDS/ÂΜL (ref 0.6–4.47)
LYMPHOCYTES NFR BLD AUTO: 14 % (ref 14–44)
MAGNESIUM SERPL-MCNC: 1.6 MG/DL (ref 1.9–2.7)
MCH RBC QN AUTO: 18.5 PG (ref 26.8–34.3)
MCHC RBC AUTO-ENTMCNC: 29.6 G/DL (ref 31.4–37.4)
MCV RBC AUTO: 63 FL (ref 82–98)
MONOCYTES # BLD AUTO: 1.28 THOUSAND/ÂΜL (ref 0.17–1.22)
MONOCYTES NFR BLD AUTO: 13 % (ref 4–12)
NEUTROPHILS # BLD AUTO: 7.38 THOUSANDS/ÂΜL (ref 1.85–7.62)
NEUTS SEG NFR BLD AUTO: 71 % (ref 43–75)
NRBC BLD AUTO-RTO: 0 /100 WBCS
PLATELET # BLD AUTO: 356 THOUSANDS/UL (ref 149–390)
PMV BLD AUTO: 8.7 FL (ref 8.9–12.7)
POTASSIUM SERPL-SCNC: 3.4 MMOL/L (ref 3.5–5.3)
PROCALCITONIN SERPL-MCNC: 0.13 NG/ML
RBC # BLD AUTO: 5.62 MILLION/UL (ref 3.88–5.62)
SODIUM SERPL-SCNC: 134 MMOL/L (ref 135–147)
WBC # BLD AUTO: 10.27 THOUSAND/UL (ref 4.31–10.16)

## 2024-05-25 PROCEDURE — 99222 1ST HOSP IP/OBS MODERATE 55: CPT | Performed by: INTERNAL MEDICINE

## 2024-05-25 PROCEDURE — 84145 PROCALCITONIN (PCT): CPT | Performed by: NURSE PRACTITIONER

## 2024-05-25 PROCEDURE — 83735 ASSAY OF MAGNESIUM: CPT | Performed by: NURSE PRACTITIONER

## 2024-05-25 PROCEDURE — 96376 TX/PRO/DX INJ SAME DRUG ADON: CPT

## 2024-05-25 PROCEDURE — 80048 BASIC METABOLIC PNL TOTAL CA: CPT | Performed by: NURSE PRACTITIONER

## 2024-05-25 PROCEDURE — 85025 COMPLETE CBC W/AUTO DIFF WBC: CPT | Performed by: NURSE PRACTITIONER

## 2024-05-25 PROCEDURE — 96375 TX/PRO/DX INJ NEW DRUG ADDON: CPT

## 2024-05-25 RX ORDER — SODIUM PHOSPHATE,MONO-DIBASIC 19G-7G/118
1 ENEMA (ML) RECTAL ONCE
Status: COMPLETED | OUTPATIENT
Start: 2024-05-25 | End: 2024-05-25

## 2024-05-25 RX ORDER — ONDANSETRON 2 MG/ML
4 INJECTION INTRAMUSCULAR; INTRAVENOUS ONCE
Status: COMPLETED | OUTPATIENT
Start: 2024-05-25 | End: 2024-05-25

## 2024-05-25 RX ORDER — ONDANSETRON 2 MG/ML
4 INJECTION INTRAMUSCULAR; INTRAVENOUS EVERY 6 HOURS PRN
Status: DISCONTINUED | OUTPATIENT
Start: 2024-05-25 | End: 2024-05-26 | Stop reason: HOSPADM

## 2024-05-25 RX ORDER — MAGNESIUM SULFATE HEPTAHYDRATE 40 MG/ML
2 INJECTION, SOLUTION INTRAVENOUS ONCE
Status: COMPLETED | OUTPATIENT
Start: 2024-05-25 | End: 2024-05-25

## 2024-05-25 RX ORDER — FAMOTIDINE 10 MG/ML
20 INJECTION, SOLUTION INTRAVENOUS EVERY 12 HOURS SCHEDULED
Status: DISCONTINUED | OUTPATIENT
Start: 2024-05-25 | End: 2024-05-26 | Stop reason: HOSPADM

## 2024-05-25 RX ORDER — POLYETHYLENE GLYCOL 3350 17 G/17G
17 POWDER, FOR SOLUTION ORAL DAILY
Status: DISCONTINUED | OUTPATIENT
Start: 2024-05-25 | End: 2024-05-25

## 2024-05-25 RX ORDER — METRONIDAZOLE 500 MG/100ML
500 INJECTION, SOLUTION INTRAVENOUS EVERY 8 HOURS
Status: DISCONTINUED | OUTPATIENT
Start: 2024-05-25 | End: 2024-05-25

## 2024-05-25 RX ORDER — ENOXAPARIN SODIUM 100 MG/ML
40 INJECTION SUBCUTANEOUS DAILY
Status: DISCONTINUED | OUTPATIENT
Start: 2024-05-25 | End: 2024-05-26 | Stop reason: HOSPADM

## 2024-05-25 RX ORDER — HYDROMORPHONE HYDROCHLORIDE 2 MG/ML
2 INJECTION, SOLUTION INTRAMUSCULAR; INTRAVENOUS; SUBCUTANEOUS ONCE
Status: COMPLETED | OUTPATIENT
Start: 2024-05-25 | End: 2024-05-25

## 2024-05-25 RX ORDER — TOFACITINIB 11 MG/1
11 TABLET, FILM COATED, EXTENDED RELEASE ORAL DAILY
COMMUNITY

## 2024-05-25 RX ORDER — SODIUM PHOSPHATE, DIBASIC AND SODIUM PHOSPHATE, MONOBASIC 3.5; 9.5 G/66ML; G/66ML
1 ENEMA RECTAL ONCE
Status: DISCONTINUED | OUTPATIENT
Start: 2024-05-25 | End: 2024-05-25

## 2024-05-25 RX ORDER — HYDROMORPHONE HCL/PF 1 MG/ML
1 SYRINGE (ML) INJECTION ONCE
Status: COMPLETED | OUTPATIENT
Start: 2024-05-25 | End: 2024-05-25

## 2024-05-25 RX ORDER — POLYETHYLENE GLYCOL 3350 17 G/17G
238 POWDER, FOR SOLUTION ORAL ONCE
Status: COMPLETED | OUTPATIENT
Start: 2024-05-25 | End: 2024-05-25

## 2024-05-25 RX ORDER — SODIUM CHLORIDE 9 MG/ML
125 INJECTION, SOLUTION INTRAVENOUS CONTINUOUS
Status: DISCONTINUED | OUTPATIENT
Start: 2024-05-25 | End: 2024-05-26 | Stop reason: HOSPADM

## 2024-05-25 RX ORDER — POTASSIUM CHLORIDE 14.9 MG/ML
20 INJECTION INTRAVENOUS ONCE
Status: COMPLETED | OUTPATIENT
Start: 2024-05-25 | End: 2024-05-25

## 2024-05-25 RX ORDER — ACETAMINOPHEN 325 MG/1
650 TABLET ORAL EVERY 6 HOURS PRN
Status: DISCONTINUED | OUTPATIENT
Start: 2024-05-25 | End: 2024-05-26 | Stop reason: HOSPADM

## 2024-05-25 RX ORDER — DOCUSATE SODIUM 100 MG/1
100 CAPSULE, LIQUID FILLED ORAL 2 TIMES DAILY
Status: DISCONTINUED | OUTPATIENT
Start: 2024-05-25 | End: 2024-05-26 | Stop reason: HOSPADM

## 2024-05-25 RX ORDER — MINERAL OIL 100 G/100G
1 OIL RECTAL ONCE
Status: DISCONTINUED | OUTPATIENT
Start: 2024-05-25 | End: 2024-05-25

## 2024-05-25 RX ORDER — CEFTRIAXONE 1 G/50ML
1000 INJECTION, SOLUTION INTRAVENOUS EVERY 24 HOURS
Status: DISCONTINUED | OUTPATIENT
Start: 2024-05-25 | End: 2024-05-25

## 2024-05-25 RX ORDER — POTASSIUM CHLORIDE 29.8 MG/ML
40 INJECTION INTRAVENOUS ONCE
Status: DISCONTINUED | OUTPATIENT
Start: 2024-05-25 | End: 2024-05-25 | Stop reason: CLARIF

## 2024-05-25 RX ORDER — HYDROMORPHONE HCL/PF 1 MG/ML
0.5 SYRINGE (ML) INJECTION
Status: DISCONTINUED | OUTPATIENT
Start: 2024-05-25 | End: 2024-05-26 | Stop reason: HOSPADM

## 2024-05-25 RX ADMIN — ONDANSETRON 4 MG: 2 INJECTION INTRAMUSCULAR; INTRAVENOUS at 05:07

## 2024-05-25 RX ADMIN — SODIUM PHOSPHATE 1 ENEMA: 7; 19 ENEMA RECTAL at 11:30

## 2024-05-25 RX ADMIN — SODIUM PHOSPHATE 1 ENEMA: 7; 19 ENEMA RECTAL at 02:17

## 2024-05-25 RX ADMIN — ENOXAPARIN SODIUM 40 MG: 40 INJECTION SUBCUTANEOUS at 08:41

## 2024-05-25 RX ADMIN — POTASSIUM CHLORIDE 20 MEQ: 14.9 INJECTION, SOLUTION INTRAVENOUS at 14:21

## 2024-05-25 RX ADMIN — HYDROMORPHONE HYDROCHLORIDE 0.5 MG: 1 INJECTION, SOLUTION INTRAMUSCULAR; INTRAVENOUS; SUBCUTANEOUS at 21:50

## 2024-05-25 RX ADMIN — METRONIDAZOLE 500 MG: 500 INJECTION, SOLUTION INTRAVENOUS at 13:56

## 2024-05-25 RX ADMIN — ONDANSETRON 4 MG: 2 INJECTION INTRAMUSCULAR; INTRAVENOUS at 11:13

## 2024-05-25 RX ADMIN — FAMOTIDINE 20 MG: 10 INJECTION, SOLUTION INTRAVENOUS at 21:50

## 2024-05-25 RX ADMIN — POLYETHYLENE GLYCOL 3350 238 G: 17 POWDER, FOR SOLUTION ORAL at 13:54

## 2024-05-25 RX ADMIN — HYDROMORPHONE HYDROCHLORIDE 1 MG: 1 INJECTION, SOLUTION INTRAMUSCULAR; INTRAVENOUS; SUBCUTANEOUS at 04:02

## 2024-05-25 RX ADMIN — HYDROMORPHONE HYDROCHLORIDE 0.5 MG: 1 INJECTION, SOLUTION INTRAMUSCULAR; INTRAVENOUS; SUBCUTANEOUS at 15:04

## 2024-05-25 RX ADMIN — MAGNESIUM SULFATE HEPTAHYDRATE 2 G: 40 INJECTION, SOLUTION INTRAVENOUS at 10:22

## 2024-05-25 RX ADMIN — HYDROMORPHONE HYDROCHLORIDE 2 MG: 2 INJECTION, SOLUTION INTRAMUSCULAR; INTRAVENOUS; SUBCUTANEOUS at 01:06

## 2024-05-25 RX ADMIN — ONDANSETRON 4 MG: 2 INJECTION INTRAMUSCULAR; INTRAVENOUS at 02:33

## 2024-05-25 RX ADMIN — HYDROMORPHONE HYDROCHLORIDE 0.5 MG: 1 INJECTION, SOLUTION INTRAMUSCULAR; INTRAVENOUS; SUBCUTANEOUS at 18:47

## 2024-05-25 RX ADMIN — DOCUSATE SODIUM 100 MG: 100 CAPSULE, LIQUID FILLED ORAL at 06:10

## 2024-05-25 RX ADMIN — HYDROMORPHONE HYDROCHLORIDE 0.5 MG: 1 INJECTION, SOLUTION INTRAMUSCULAR; INTRAVENOUS; SUBCUTANEOUS at 07:48

## 2024-05-25 RX ADMIN — POLYETHYLENE GLYCOL 3350 17 G: 17 POWDER, FOR SOLUTION ORAL at 11:18

## 2024-05-25 RX ADMIN — ONDANSETRON 4 MG: 2 INJECTION INTRAMUSCULAR; INTRAVENOUS at 18:47

## 2024-05-25 RX ADMIN — METRONIDAZOLE 500 MG: 500 INJECTION, SOLUTION INTRAVENOUS at 08:42

## 2024-05-25 RX ADMIN — CEFTRIAXONE 1000 MG: 1 INJECTION, SOLUTION INTRAVENOUS at 06:10

## 2024-05-25 RX ADMIN — DOCUSATE SODIUM 100 MG: 100 CAPSULE, LIQUID FILLED ORAL at 17:42

## 2024-05-25 RX ADMIN — ACETAMINOPHEN 650 MG: 325 TABLET ORAL at 16:51

## 2024-05-25 RX ADMIN — FAMOTIDINE 20 MG: 10 INJECTION, SOLUTION INTRAVENOUS at 06:10

## 2024-05-25 RX ADMIN — POTASSIUM CHLORIDE 20 MEQ: 14.9 INJECTION, SOLUTION INTRAVENOUS at 11:54

## 2024-05-25 RX ADMIN — SODIUM CHLORIDE 125 ML/HR: 0.9 INJECTION, SOLUTION INTRAVENOUS at 06:10

## 2024-05-25 NOTE — CONSULTS
Consultation -  [facility]  Nico Cruz 29 y.o. male MRN: [id number]  Unit/Bed#: [address] [place] [address] Encounter: [id number]        Inpatient consult to gastroenterology  Consult performed by: Susy Gomez PA-C  Consult ordered by: [place] Reyez          Reason for Consult / Principal Problem: Ulcerative colitis status post proctocolectomy with ileoanal pouch    ASSESSMENT and PLAN:      29-year-old male with extensive history of ulcerative colitis status post proctocolectomy with ileoanal anastomosis/pouch with postoperative complications and multiple revisions, recently redone last year who presented with severe abdominal pain, worsening diarrhea and CT scan showing significant fecal impaction stool burden.    History of ulcerative colitis status post proctocolectomy with ileoanal pouch  Abnormal CT, fecal impaction, fecalization of small bowel  Patient reports baseline bowel movements around 6-8 watery daily, more recently seem to increase to about 10-12 watery daily.  Severe abdominal pain starting yesterday.  Nausea but no vomiting.  No improvement with enema in the ER.  Abdominal exam is benign.  Suspect symptoms in the setting of severe constipation and fecal impaction, etiology of this possibly multifactorial in the setting of poor pelvic floor, dysmotility versus recurrence of anastomotic strictures.    -Primary team reached out to on-call colorectal provider.  I also personally discussed patient's case with colorectal surgeon, Dr. Mijares.  Suspect severe constipation is likely multifactorial and agrees with MiraLAX bowel prep trial.   -Patient has not had any vomiting.  Abdominal exam is benign.  Will start MiraLAX prep.  - Will hold off on further enemas for now.  - Clear liquid diet.    -If no significant bowel evacuation with above, consider more prep and/or flex sigmoidoscopy to assess for any stricture and possibly gently dilate.  I discussed with patient we would use  this as a last resort and not rush to this option first.    -Encourage ambulation.  -Continue fluids  -Agree with anticoagulation in the setting of inflammatory bowel disease history.  - Try to avoid all opioid pain medications when possible.  - Replete electrolytes  -Monitor abdominal exam, vitals for any worsening symptoms.  -No clear indication for antibiotics, likely can discontinue per SLIM    -I had extensive discussion with patient, his girlfriend and his mother at bedside.      At the time of this dictation, patient noted to have documented medium, soft brown bowel movement around 1 hour ago.    -------------------------------------------------------------------------------------------------------------------    HPI:     Nico Cruz is a 29-year-old male with extensive history of ulcerative colitis status post total proctocolectomy around 2015 with ileal pouch anal anastomosis with multiple complications and revisions including redo of pouch anal anastomosis last year with ileostomy, this was then taken down again in 12/2023, ankylosing spondylitis on Xeljanz who presented to the emergency room yesterday 5/24 for acute onset severe abdominal pain and diarrhea.  On arrival patient had CT scan which showed fecal impaction at the rectum with feculent material throughout the mildly dilated small bowel loops without discrete transition point suggesting ileus/slow transit.  Patient was given Fleet enema in the ER with questionable output of small amount of stool.  Lab work shows normal procalcitonin.  Leukocytosis on arrival which is downtrending.  Hypomagnesia.  Mild hypokalemia.    Patient reports he was in his normal state of health with his typical average of bowel movements of around 6-8 a day.  Denies any outpatient opioid pain medications or Lomotil.  Over the last few days he does report the frequency seem to increase to around 10-12 daily. Yesterday he began having severe left-sided abdominal pain  which came up into the epigastric area.  Pain continued to significantly worsen prompting him to come to the emergency room.  He has not received any significant bowel regimen yet since being here.  He denies any passage of good bowel movements so far.  He reports nausea but no vomiting episodes.    Patient recently establish care with colorectal at [facility], Dr. Quevedo.    REVIEW OF SYSTEMS:    CONSTITUTIONAL: Denies any fever, chills, or rigors. +decreased appetite, nausea, no vomiting  HEENT: No earache or tinnitus. Denies hearing loss or visual disturbances.  CARDIOVASCULAR: No chest pain or palpitations.   RESPIRATORY: Denies any cough, hemoptysis, shortness of breath or dyspnea on exertion.  GASTROINTESTINAL: As noted in the History of Present Illness.   GENITOURINARY: No problems with urination. Denies any hematuria or dysuria.  NEUROLOGIC: No dizziness or vertigo, denies headaches.   MUSCULOSKELETAL: Denies any muscle or joint pain.   SKIN: Denies skin rashes or itching.   ENDOCRINE: Denies excessive thirst. Denies intolerance to heat or cold.  PSYCHOSOCIAL: +anxious Denies any recent memory loss.       Historical Information   Past Medical History:   Diagnosis Date    Ankylosing spondylitis (HCC)     Anxiety     Bowel obstruction (HCC)     Clostridium difficile colitis 9/13/2018    Colitis     Ileal pouchitis (HCC) 9/13/2018    Pancreatitis     Ulcerative colitis (HCC)      Past Surgical History:   Procedure Laterality Date    COLECTOMY TOTAL      with ileal pouch and anastemosis    IR DRAINAGE TUBE PLACEMENT  12/27/2023    IR PICC PLACEMENT SINGLE LUMEN  3/1/2022    TOTAL COLECTOMY       Social History   Social History     Substance and Sexual Activity   Alcohol Use Not Currently     Social History     Substance and Sexual Activity   Drug Use No     Social History     Tobacco Use   Smoking Status Never   Smokeless Tobacco Never     Family History   Problem Relation Age of Onset    Lung disease Neg Hx         Meds/Allergies       Medications Prior to Admission:     prochlorperazine (COMPAZINE) 5 mg tablet    Tofacitinib Citrate ER (Xeljanz XR) 11 MG TB24    diphenoxylate-atropine (LOMOTIL) 2.5-0.025 mg per tablet  Current Facility-Administered Medications   Medication Dose Route Frequency    acetaminophen (TYLENOL) tablet 650 mg  650 mg Oral Q6H PRN    cefTRIAXone (ROCEPHIN) IVPB (premix in dextrose) 1,000 mg 50 mL  1,000 mg Intravenous Q24H    docusate sodium (COLACE) capsule 100 mg  100 mg Oral BID    enoxaparin (LOVENOX) subcutaneous injection 40 mg  40 mg Subcutaneous Daily    Famotidine (PF) (PEPCID) injection 20 mg  20 mg Intravenous Q12H KAITLIN    HYDROmorphone (DILAUDID) injection 0.5 mg  0.5 mg Intravenous Q3H PRN    metroNIDAZOLE (FLAGYL) IVPB (premix) 500 mg 100 mL  500 mg Intravenous Q8H    ondansetron (ZOFRAN) injection 4 mg  4 mg Intravenous Q6H PRN    polyethylene glycol (GLYCOLAX) bowel prep 238 g  238 g Oral Once    potassium chloride 20 mEq IVPB (premix)  20 mEq Intravenous Once    Followed by    potassium chloride 20 mEq IVPB (premix)  20 mEq Intravenous Once    sodium chloride 0.9 % infusion  125 mL/hr Intravenous Continuous    Tofacitinib Citrate ER TB24 11 mg  11 mg Oral Daily       Allergies   Allergen Reactions    Cefazolin Hives     Other reaction(s): Arrythmia (Abnormal Heartbeat), Rash Maculopapular    Mesalamine GI Intolerance     Other reaction(s): Pancreatitis  Annotation - [id number]: pancreatitis    Silver Rash     PT REPORTS NO REACTION     Wound Dressings Rash     Coloplast ostomy Products            Objective     Blood pressure 128/80, pulse 74, temperature 97.7 °F (36.5 °C), resp. rate 18, SpO2 98%.      Intake/Output Summary (Last 24 hours) at 5/25/2024 1309  Last data filed at 5/25/2024 0030  Gross per 24 hour   Intake 1000 ml   Output --   Net 1000 ml         PHYSICAL EXAM:      General Appearance:   Alert and oriented.  Appears anxious.    HEENT:   Normocephalic, atraumatic,  anicteric.   Neck:  Supple, symmetrical, trachea midline, no adenopathy.   Lungs:   Clear to auscultation bilaterally; no rales, rhonchi or wheezing; respirations unlabored    Heart::   S1 and S2 normal; regular rate and rhythm; no murmur, rub, or gallop.   Abdomen:   Abdomen is soft and flat.  No distention noted.  Bowel sounds are noted.  He reports some tenderness with palpation mainly on the left sided abdomen.   Genitalia:   Deferred    Rectal:   Deferred    Extremities:  No cyanosis, clubbing or edema    Pulses:  2+ and symmetric all extremities    Skin:  Skin color, texture, turgor normal, no rashes or lesions    Lymph nodes:  No palpable cervical, axillary or inguinal lymphadenopathy        Lab Results:   Results from last 7 days   Lab Units 05/25/24  0645   WBC Thousand/uL 10.27*   HEMOGLOBIN g/dL 10.4*   HEMATOCRIT % 35.1*   PLATELETS Thousands/uL 356   SEGS PCT % 71   LYMPHO PCT % 14   MONO PCT % 13*   EOS PCT % 1     Results from last 7 days   Lab Units 05/25/24  0645 05/24/24  2323   POTASSIUM mmol/L 3.4* 3.6   CHLORIDE mmol/L 103 102   CO2 mmol/L 26 22   BUN mg/dL 9 9   CREATININE mg/dL 0.91 1.00   CALCIUM mg/dL 9.1 10.3*   ALK PHOS U/L  --  87   ALT U/L  --  19   AST U/L  --  15         Results from last 7 days   Lab Units 05/24/24  2323   LIPASE u/L 17       Imaging Studies: I have personally reviewed pertinent imaging studies.    CT abdomen pelvis with contrast    Result Date: 5/25/2024  Impression: Fecal impaction at the rectum. Feculent material throughout mildly dilated small bowel loops, without discrete transition point, suggesting ileus/slow transit. Workstation performed: [id number]           Patient was seen and examined by Dr. Jimenez. All suazo medical decisions were made by Dr. Jimenez. Thank you for allowing us to participate in the care of this present patient. We will follow-up with you closely.

## 2024-05-25 NOTE — ASSESSMENT & PLAN NOTE
Patient presents with acute onset left-sided abdominal pain with associated nausea started around 8:30 PM last night.  Reports has chronic loose to watery diarrhea about 8 times a day, reports having more frequent diarrhea about 12 times a day in past 5 days.  Chart reviewed. History of UC, status post total 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 stricture with ileostomy creation in October 2022.  Ileostomy was taken down in 12/2023.  Patient follows colorectal surgery in [facility].   CT abdomen pelvis with IV contrast showed - Fecal impaction at the rectum. Feculent material throughout mildly dilated small bowel loops, without discrete transition point, suggesting ileus/slow transit.  WBC 13.36, no bands, patient afebrile.  Tried Fleet enema in ED with good effect  Start Colace  Hold off on MiraLAX in view of nausea  N.p.o.  GI prophylaxis  Pain control.  Minimize narcotic.  Empirical Rocephin Flagyl in view of complicated history  Consult GI

## 2024-05-25 NOTE — UTILIZATION REVIEW
Initial Clinical Review    Admission: Date/Time/Statement:   Admission Orders (From admission, onward)       Ordered        05/25/24 0358  INPATIENT ADMISSION  Once                          Orders Placed This Encounter   Procedures    INPATIENT ADMISSION     Standing Status:   Standing     Number of Occurrences:   1     Order Specific Question:   Level of Care     Answer:   Med Surg [16]     Order Specific Question:   Estimated length of stay     Answer:   More than 2 Midnights     Order Specific Question:   Certification     Answer:   I certify that inpatient services are medically necessary for this patient for a duration of greater than two midnights. See H&P and MD Progress Notes for additional information about the patient's course of treatment.     ED Arrival Information       Expected   -    Arrival   5/24/2024 22:59    Acuity   Urgent              Means of arrival   Walk-In    Escorted by   Self    Service   Hospitalist    Admission type   Emergency              Arrival complaint   -             Chief Complaint   Patient presents with    Abdominal Pain     Pt reports diarrhea for 5 days. Pt reports during work he started having severe abdominal pain. Pt reports nausea.        Initial Presentation: 29 y.o. male presented to ED from home as inpatient admission for abdominal pain  PMH of UC, underwent multiple surgeries, ankylosing spondylitis who presents with acute onset left-sided abdominal pain with associated nausea started around 8:30 PM last night.  Patient reports he has chronic loose to watery diarrhea about 8 times a day, reports having more frequent diarrhea about 12 times a day in past 5 days.  Reports feeling full in abdomen. On exam left side abdomen tender.  Multiple old surgical scars in abdomen  CT abdomen pelvis with IV contrast showed - Fecal impaction at the rectum. Feculent material throughout mildly dilated small bowel loops, without discrete transition point, suggesting ileus/slow  transit.Tried Fleet enema in ED with good effect  Plan start Colace Hold off on MiraLAX in view of nausea N.p.o. GI prophylaxis  Pain control.  Minimize narcotic.  Empirical Rocephin Flagyl in view of complicated history Consult GI and supportive care     Anticipated Length of Stay/Certification Statement:  Patient will be admitted on an Inpatient basis with an anticipated length of stay of  > 2 midnights.   Justification for Hospital Stay: Abdominal pain fecal impaction     Date: 05-25-24   Day 2: left sided abdominal tenderness Patient tearful during exam, emotional. If patient does not have a bowel movement, patient might need flexible sigmoidoscopy to assess for any stricture.  S/p 2nd fleets enema Electrolyte abnormality-patient noted to have hypokalemia and hypomagnesemia and hyponatremia. Continue IV hydration. Electrolytes were repleted. Continue to need IV Zofran and IV dilaudid starting with soft brown stools --> watery green stools    GI Consult :  ASSESSMENT and PLAN:      29-year-old male with extensive history of ulcerative colitis status post proctocolectomy with ileoanal anastomosis/pouch with postoperative complications and multiple revisions, recently redone last year who presented with severe abdominal pain, worsening diarrhea and CT scan showing significant fecal impaction stool burden.  History of ulcerative colitis status post proctocolectomy with ileoanal pouch  Abnormal CT, fecal impaction, fecalization of small bowel  Patient reports baseline bowel movements around 6-8 watery daily, more recently seem to increase to about 10-12 watery daily.  Severe abdominal pain starting yesterday.  Nausea but no vomiting.  No improvement with enema in the ER.  Abdominal exam is benign.  Suspect symptoms in the setting of severe constipation and fecal impaction, etiology of this possibly multifactorial in the setting of poor pelvic floor, dysmotility versus recurrence of anastomotic strictures.  -Primary  team reached out to on-call colorectal provider.  I also personally discussed patient's case with colorectal surgeon, Dr. Mijares.  Suspect severe constipation is likely multifactorial and agrees with MiraLAX bowel prep trial.   -Patient has not had any vomiting.  Abdominal exam is benign.  Will start MiraLAX prep.  - Will hold off on further enemas for now.  - Clear liquid diet.                ED Triage Vitals   Temperature Pulse Respirations Blood Pressure SpO2   05/24/24 2302 05/24/24 2302 05/24/24 2302 05/24/24 2306 05/24/24 2302   98.4 °F (36.9 °C) (!) 162 22 (!) 161/110 97 %      Temp Source Heart Rate Source Patient Position - Orthostatic VS BP Location FiO2 (%)   05/24/24 2302 05/24/24 2302 05/24/24 2302 05/24/24 2302 --   Oral Monitor Sitting Left arm       Pain Score       05/24/24 2330       10 - Worst Possible Pain          Wt Readings from Last 1 Encounters:   04/09/24 67 kg (147 lb 9.6 oz)     Additional Vital Signs:   Date/Time Temp Pulse Resp BP MAP (mmHg) SpO2 O2 Device Patient Position - Orthostatic VS   05/26/24 07:40:57 98.2 °F (36.8 °C) 87 16 119/62 81 99 % -- --   05/25/24 22:03:30 97.7 °F (36.5 °C) 73 16 120/64 83 98 % -- --   05/25/24 19:30:56 97.9 °F (36.6 °C) 79 18 131/80 97 95 % -- --   05/25/24 15:46:44 97.3 °F (36.3 °C) Abnormal  86 16 132/83 99 94 % -- --   05/25/24 07:42:49 97.7 °F (36.5 °C) 74 18 128/80 96 98 % -- --   05/25/24 05:47:52 -- 80 17 130/80 97 95 % -- --   05/25/24 0508 -- 84 18 123/82 97 95 % None (Room air) Sitting   05/25/24 0200 -- 106 Abnormal  20 131/80 101 95 % None (Room air) Lying   05/25/24 0025 -- 103 -- -- -- -- -- --   05/25/24 0015 -- 88 20 -- -- 97 % None (Room air) --   05/24/24 2306 -- -- -- 161/110 Abnormal  125 -- -- --         Pertinent Labs/Diagnostic Test Results:   CT abdomen pelvis with contrast   Final Result by Valentin Alvarez DO (05/25 0152)      Fecal impaction at the rectum.      Feculent material throughout mildly dilated small bowel loops,  without discrete transition point, suggesting ileus/slow transit.               Workstation performed: [id number]               Results from last 7 days   Lab Units 05/25/24  0645 05/24/24  2323   WBC Thousand/uL 10.27* 13.36*   HEMOGLOBIN g/dL 10.4* 12.1   HEMATOCRIT % 35.1* 40.1   PLATELETS Thousands/uL 356 402*   TOTAL NEUT ABS Thousands/µL 7.38 10.43*         Results from last 7 days   Lab Units 05/25/24  0645 05/24/24  2323   SODIUM mmol/L 134* 135   POTASSIUM mmol/L 3.4* 3.6   CHLORIDE mmol/L 103 102   CO2 mmol/L 26 22   ANION GAP mmol/L 5 11   BUN mg/dL 9 9   CREATININE mg/dL 0.91 1.00   EGFR ml/min/1.73sq m 111 99   CALCIUM mg/dL 9.1 10.3*   MAGNESIUM mg/dL 1.6*  --      Results from last 7 days   Lab Units 05/24/24  2323   AST U/L 15   ALT U/L 19   ALK PHOS U/L 87   TOTAL PROTEIN g/dL 8.2   ALBUMIN g/dL 5.0   TOTAL BILIRUBIN mg/dL 0.89         Results from last 7 days   Lab Units 05/25/24  0645 05/24/24  2323   GLUCOSE RANDOM mg/dL 93 100     Results from last 7 days   Lab Units 05/25/24  0645   PROCALCITONIN ng/ml 0.13     Results from last 7 days   Lab Units 05/24/24  2323   LACTIC ACID mmol/L 1.6     Results from last 7 days   Lab Units 05/24/24  2323   LIPASE u/L 17         ED Treatment:   Medication Administration from 05/24/2024 2259 to 05/25/2024 0538         Date/Time Order Dose Route Action     05/24/2024 2330 EDT HYDROmorphone (DILAUDID) injection 1 mg 1 mg Intravenous Given     05/24/2024 2329 EDT ondansetron (ZOFRAN) injection 4 mg 4 mg Intravenous Given     05/24/2024 2330 EDT sodium chloride 0.9 % bolus 1,000 mL 1,000 mL Intravenous New Bag     05/24/2024 2346 EDT HYDROmorphone (DILAUDID) injection 1 mg 1 mg Intravenous Given     05/24/2024 2359 EDT iohexol (OMNIPAQUE) 350 MG/ML injection (MULTI-DOSE) 100 mL 100 mL Intravenous Given     05/25/2024 0106 EDT HYDROmorphone (DILAUDID) injection 2 mg 2 mg Intravenous Given     05/25/2024 0217 EDT sodium phosphate-biphosphate (FLEET) enema 1 enema 1  enema Rectal Given     05/25/2024 0233 EDT ondansetron (ZOFRAN) injection 4 mg 4 mg Intravenous Given     05/25/2024 0402 EDT HYDROmorphone (DILAUDID) injection 1 mg 1 mg Intravenous Given     05/25/2024 0507 EDT ondansetron (ZOFRAN) injection 4 mg 4 mg Intravenous Given          Past Medical History:   Diagnosis Date    Ankylosing spondylitis (HCC)     Anxiety     Bowel obstruction (HCC)     Clostridium difficile colitis 9/13/2018    Colitis     Ileal pouchitis (HCC) 9/13/2018    Pancreatitis     Ulcerative colitis (HCC)      Present on Admission:   Ankylosing spondylitis (HCC)   Pain in the abdomen      Admitting Diagnosis: Fecal impaction (HCC) [K56.41]  Ulcerative colitis (HCC) [K51.90]  Abdominal pain [R10.9]  Pain in the abdomen [R10.9]  Age/Sex: 29 y.o. male  Admission Orders:  Scheduled Medications:  docusate sodium, 100 mg, Oral, BID  enoxaparin, 40 mg, Subcutaneous, Daily  famotidine, 20 mg, Intravenous, Q12H KAITLIN  potassium chloride, 20 mEq, Intravenous, Once  Tofacitinib Citrate ER, 11 mg, Oral, Daily  metroNIDAZOLE (FLAGYL) IVPB (premix) 500 mg 100 mL   cefTRIAXone (ROCEPHIN) IVPB (premix in dextrose) 1,000 mg 50 m   Continuous IV Infusions:  sodium chloride, 125 mL/hr, Intravenous, Continuous      PRN Meds:  acetaminophen, 650 mg, Oral, Q6H PRN  HYDROmorphone, 0.5 mg, Intravenous, Q3H PRN  x 7 since admission   ondansetron, 4 mg, Intravenous, Q6H PRN  X 3 since admission         IP CONSULT TO GASTROENTEROLOGY    Network Utilization Review Department  ATTENTION: Please call with any questions or concerns to [phone] and carefully listen to the prompts so that you are directed to the right person. All voicemails are confidential.   For Discharge needs, contact Care Management DC Support Team at [phone] opt. 2  Send all requests for admission clinical reviews, approved or denied determinations and any other requests to dedicated fax number below belonging to the campus where the patient is  receiving treatment. List of dedicated fax numbers for the Facilities:  FACILITY NAME UR FAX NUMBER   ADMISSION DENIALS (Administrative/Medical Necessity) [phone]   DISCHARGE SUPPORT TEAM (NETWORK) [phone]   PARENT CHILD HEALTH (Maternity/NICU/Pediatrics) [phone]   [facility] [phone]   [facility] [phone]   [facility] [phone]   [facility] [phone]   [facility] [phone]   [facility] [phone]   [facility] [phone]   [facility] [phone]   [facility] [phone]   [facility] [phone]   [facility] [phone]   [facility] [phone]

## 2024-05-25 NOTE — ED PROVIDER NOTES
History  Chief Complaint   Patient presents with    Abdominal Pain     Pt reports diarrhea for 5 days. Pt reports during work he started having severe abdominal pain. Pt reports nausea.      29-year-old male past medical history significant for ulcerative colitis that was treated with pain colectomy that was then reversed but also had multiple complications now in full reversal and in continuity presenting to ED today for left lower quadrant right lower quad abdominal pain.  Patient says that he is also been having increased diarrhea as well.  Says that the diarrhea has been going on for the last 5 days.  He says that while he was at work today (he is a ) he started having severe abdominal pain as well as nausea.  He said that this has happened to him before in the past.  No fevers.        Prior to Admission Medications   Prescriptions Last Dose Informant Patient Reported? Taking?   Tofacitinib Citrate ER (Xeljanz XR) 11 MG TB24 5/25/2024 at 1100  Yes Yes   Sig: Take 11 mg by mouth in the morning   diphenoxylate-atropine (LOMOTIL) 2.5-0.025 mg per tablet   No No   Sig: Take 1 tablet by mouth 4 (four) times a day as needed for diarrhea for up to 10 doses   prochlorperazine (COMPAZINE) 5 mg tablet   No No   Sig: Take 1 tablet (5 mg total) by mouth every 6 (six) hours as needed for nausea or vomiting      Facility-Administered Medications: None       Past Medical History:   Diagnosis Date    Ankylosing spondylitis (HCC)     Anxiety     Bowel obstruction (HCC)     Clostridium difficile colitis 9/13/2018    Colitis     Ileal pouchitis (HCC) 9/13/2018    Pancreatitis     Ulcerative colitis (HCC)        Past Surgical History:   Procedure Laterality Date    COLECTOMY TOTAL      with ileal pouch and anastemosis    IR DRAINAGE TUBE PLACEMENT  12/27/2023    IR PICC PLACEMENT SINGLE LUMEN  3/1/2022    TOTAL COLECTOMY         Family History   Problem Relation Age of Onset    Lung disease Neg Hx      I have reviewed  and agree with the history as documented.    E-Cigarette/Vaping    E-Cigarette Use Never User      E-Cigarette/Vaping Substances    Nicotine No     THC No     CBD No     Flavoring No     Other No     Unknown No      Social History     Tobacco Use    Smoking status: Never    Smokeless tobacco: Never   Vaping Use    Vaping status: Never Used   Substance Use Topics    Alcohol use: Not Currently    Drug use: No       Review of Systems   Constitutional:  Negative for chills and fever.   HENT:  Negative for hearing loss.    Eyes:  Negative for visual disturbance.   Respiratory:  Negative for shortness of breath.    Cardiovascular:  Negative for chest pain.   Gastrointestinal:  Positive for abdominal pain, diarrhea and nausea. Negative for constipation and vomiting.   Genitourinary:  Negative for difficulty urinating.   Musculoskeletal:  Negative for myalgias.   Skin:  Negative for rash.   Neurological:  Negative for dizziness.   Psychiatric/Behavioral:  Negative for agitation.    All other systems reviewed and are negative.      Physical Exam  Physical Exam  Vitals and nursing note reviewed.   Constitutional:       General: He is not in acute distress.     Appearance: Normal appearance. He is not ill-appearing.   HENT:      Head: Normocephalic and atraumatic.      Right Ear: External ear normal.      Left Ear: External ear normal.      Nose: Nose normal. No congestion.      Mouth/Throat:      Mouth: Mucous membranes are moist.      Pharynx: Oropharynx is clear. No oropharyngeal exudate.   Eyes:      General:         Right eye: No discharge.         Left eye: No discharge.      Extraocular Movements: Extraocular movements intact.      Conjunctiva/sclera: Conjunctivae normal.      Pupils: Pupils are equal, round, and reactive to light.   Cardiovascular:      Rate and Rhythm: Normal rate and regular rhythm.      Pulses: Normal pulses.      Heart sounds: Normal heart sounds.   Pulmonary:      Effort: Pulmonary effort is  normal. No respiratory distress.      Breath sounds: Normal breath sounds. No wheezing.   Abdominal:      General: Abdomen is flat. A surgical scar is present. Bowel sounds are normal. There is no distension.      Palpations: Abdomen is soft.      Tenderness: There is abdominal tenderness in the right lower quadrant, left upper quadrant and left lower quadrant.      Hernia: No hernia is present.      Comments: Multiple surgical scars present   Musculoskeletal:         General: No swelling or deformity. Normal range of motion.      Cervical back: Normal range of motion. No rigidity.   Skin:     General: Skin is warm and dry.      Capillary Refill: Capillary refill takes less than 2 seconds.   Neurological:      General: No focal deficit present.      Mental Status: He is alert and oriented to person, place, and time. Mental status is at baseline.      Cranial Nerves: No cranial nerve deficit.      Motor: No weakness.      Gait: Gait normal.   Psychiatric:         Mood and Affect: Mood normal.         Behavior: Behavior normal.         Vital Signs  ED Triage Vitals   Temperature Pulse Respirations Blood Pressure SpO2   05/24/24 2302 05/24/24 2302 05/24/24 2302 05/24/24 2306 05/24/24 2302   98.4 °F (36.9 °C) (!) 162 22 (!) 161/110 97 %      Temp Source Heart Rate Source Patient Position - Orthostatic VS BP Location FiO2 (%)   05/24/24 2302 05/24/24 2302 05/24/24 2302 05/24/24 2302 --   Oral Monitor Sitting Left arm       Pain Score       05/24/24 2330       10 - Worst Possible Pain           Vitals:    05/24/24 2306 05/25/24 0015 05/25/24 0025 05/25/24 0200   BP: (!) 161/110   131/80   Pulse:  88 103 (!) 106   Patient Position - Orthostatic VS:    Lying         Visual Acuity      ED Medications  Medications   HYDROmorphone (DILAUDID) injection 1 mg (1 mg Intravenous Given 5/24/24 2330)   ondansetron (ZOFRAN) injection 4 mg (4 mg Intravenous Given 5/24/24 2329)   sodium chloride 0.9 % bolus 1,000 mL (0 mL Intravenous  Stopped 5/25/24 0030)   HYDROmorphone (DILAUDID) injection 1 mg (1 mg Intravenous Given 5/24/24 2346)   iohexol (OMNIPAQUE) 350 MG/ML injection (MULTI-DOSE) 100 mL (100 mL Intravenous Given 5/24/24 2359)   HYDROmorphone (DILAUDID) injection 2 mg (2 mg Intravenous Given 5/25/24 0106)   sodium phosphate-biphosphate (FLEET) enema 1 enema (1 enema Rectal Given 5/25/24 0217)   ondansetron (ZOFRAN) injection 4 mg (4 mg Intravenous Given 5/25/24 0233)   HYDROmorphone (DILAUDID) injection 1 mg (1 mg Intravenous Given 5/25/24 0402)       Diagnostic Studies  Results Reviewed       Procedure Component Value Units Date/Time    Comprehensive metabolic panel [[id number]]  (Abnormal) Collected: 05/24/24 2323    Lab Status: Final result Specimen: Blood from Arm, Left Updated: 05/24/24 2352     Sodium 135 mmol/L      Potassium 3.6 mmol/L      Chloride 102 mmol/L      CO2 22 mmol/L      ANION GAP 11 mmol/L      BUN 9 mg/dL      Creatinine 1.00 mg/dL      Glucose 100 mg/dL      Calcium 10.3 mg/dL      AST 15 U/L      ALT 19 U/L      Alkaline Phosphatase 87 U/L      Total Protein 8.2 g/dL      Albumin 5.0 g/dL      Total Bilirubin 0.89 mg/dL      eGFR 99 ml/min/1.73sq m     Narrative:      [employer] guidelines for Chronic Kidney Disease (CKD):     Stage 1 with normal or high GFR (GFR > 90 mL/min/1.73 square meters)    Stage 2 Mild CKD (GFR = 60-89 mL/min/1.73 square meters)    Stage 3A Moderate CKD (GFR = 45-59 mL/min/1.73 square meters)    Stage 3B Moderate CKD (GFR = 30-44 mL/min/1.73 square meters)    Stage 4 Severe CKD (GFR = 15-29 mL/min/1.73 square meters)    Stage 5 End Stage CKD (GFR <15 mL/min/1.73 square meters)  Note: GFR calculation is accurate only with a steady state creatinine    Lipase [[id number]]  (Normal) Collected: 05/24/24 2323    Lab Status: Final result Specimen: Blood from Arm, Left Updated: 05/24/24 1052     Lipase 17 u/L     Lactic acid, plasma (w/reflex if result > 2.0) [[id number]]   (Normal) Collected: 05/24/24 2323    Lab Status: Final result Specimen: Blood from Arm, Left Updated: 05/24/24 2350     LACTIC ACID 1.6 mmol/L     Narrative:      Result may be elevated if tourniquet was used during collection.    CBC and differential [[id number]]  (Abnormal) Collected: 05/24/24 2323    Lab Status: Final result Specimen: Blood from Arm, Left Updated: 05/24/24 2330     WBC 13.36 Thousand/uL      RBC 6.52 Million/uL      Hemoglobin 12.1 g/dL      Hematocrit 40.1 %      MCV 62 fL      MCH 18.6 pg      MCHC 30.2 g/dL      RDW 18.0 %      MPV 8.3 fL      Platelets 402 Thousands/uL      nRBC 0 /100 WBCs      Segmented % 78 %      Immature Grans % 0 %      Lymphocytes % 12 %      Monocytes % 8 %      Eosinophils Relative 1 %      Basophils Relative 1 %      Absolute Neutrophils 10.43 Thousands/µL      Absolute Immature Grans 0.05 Thousand/uL      Absolute Lymphocytes 1.55 Thousands/µL      Absolute Monocytes 1.12 Thousand/µL      Eosinophils Absolute 0.09 Thousand/µL      Basophils Absolute 0.12 Thousands/µL                    CT abdomen pelvis with contrast   Final Result by Valentin Alvarez DO (05/25 0152)      Fecal impaction at the rectum.      Feculent material throughout mildly dilated small bowel loops, without discrete transition point, suggesting ileus/slow transit.               Workstation performed: [id number]                    Procedures  Procedures         ED Course  ED Course as of 05/25/24 0407   Fri May 24, 2024   2353 LACTIC ACID: 1.6  WNL   Sat May 25, 2024   0026 Pulse: 103  Improved with pain medication and fluids   0026 Comprehensive metabolic panel(!)  No actionable deragements   0026 LACTIC ACID: 1.6  Normal. Good. No evidence of bowel ischemia    0213 CT abdomen pelvis with contrast  Based on CT with fecal impaction will try fleets enema first.   0326 No success with fleet enema. I do not feel comfortable with doing more until I discuss with GI because of patient's complex history.  Worse thing to do would be to cause perforation.   0406 Discussed with GI. Said it was my call on admission, will plan to admit given again my concern. Discussed with [facility] and patient admitted.                                             Medical Decision Making  29-year-old male presenting to the ER today for abdominal pain.  At this time given his extensive surgical history concern for possible intra-abdominal infection versus possible repeat small bowel obstruction versus enteritis.  Will evaluate at this time with a CBC, CMP, lipase, CT ab pelvis with IV contrast.  Will check a lactic to evaluate for possible bowel ischemia.  Will treat his pain with IV Dilaudid.  Will give him IV Zofran.  Will give him IV fluids.  Dispo pending imaging and labs.    Amount and/or Complexity of Data Reviewed  Labs: ordered. Decision-making details documented in ED Course.  Radiology: ordered. Decision-making details documented in ED Course.    Risk  OTC drugs.  Prescription drug management.  Decision regarding hospitalization.             Disposition  Final diagnoses:   Fecal impaction (HCC)   Ulcerative colitis (HCC)     Time reflects when diagnosis was documented in both MDM as applicable and the Disposition within this note       Time User Action Codes Description Comment    5/25/2024  3:58 AM David Jordan Add [K56.41] Fecal impaction (HCC)     5/25/2024  3:58 AM David Jordan Add [K51.90] Ulcerative colitis (HCC)           ED Disposition       ED Disposition   Admit    Condition   Stable    Date/Time   Sat May 25, 2024  3:58 AM    Comment   Case was discussed with CALE Bradley and the patient's admission status was agreed to be Admission Status: inpatient status to the service of [facility] .               Follow-up Information    None         Patient's Medications   Discharge Prescriptions    No medications on file       No discharge procedures on file.    PDMP Review         Value Time User    PDMP Reviewed  Yes 12/31/2023  5:12  PM Kelly James MD            ED Provider  Electronically Signed by             David Jordan MD  05/25/24 0553

## 2024-05-25 NOTE — ASSESSMENT & PLAN NOTE
Present on admission, as evidenced by leukocytosis, tachycardia, with no clear evidence of infection.  Started on empirical antibiotic as above  Check procalcitonin  Repeat CBC with differential in the morning

## 2024-05-25 NOTE — H&P
[facility]  H&P  Name: Nico Cruz 29 y.o. male I MRN: [id number]  Unit/Bed#: [address] [place], [place] [address] I Date of Admission: 5/24/2024   Date of Service: 5/25/2024 I Hospital Day: 0      Assessment & Plan   * Pain in the abdomen  Assessment & Plan  Patient presents with acute onset left-sided abdominal pain with associated nausea started around 8:30 PM last night.  Reports has chronic loose to watery diarrhea about 8 times a day, reports having more frequent diarrhea about 12 times a day in past 5 days.  Chart reviewed. History of UC, status post total 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 stricture with ileostomy creation in October 2022.  Ileostomy was taken down in 12/2023.  Patient follows colorectal surgery in [facility].   CT abdomen pelvis with IV contrast showed - Fecal impaction at the rectum. Feculent material throughout mildly dilated small bowel loops, without discrete transition point, suggesting ileus/slow transit.  WBC 13.36, no bands, patient afebrile.  Tried Fleet enema in ED with good effect  Start Colace  Hold off on MiraLAX in view of nausea  N.p.o.  GI prophylaxis  Pain control.  Minimize narcotic.  Empirical Rocephin Flagyl in view of complicated history  Consult GI    Fecal impaction (HCC)  Assessment & Plan  Management as above  Consult GI    SIRS (systemic inflammatory response syndrome) (HCC)  Assessment & Plan  Present on admission, as evidenced by leukocytosis, tachycardia, with no clear evidence of infection.  Started on empirical antibiotic as above  Check procalcitonin  Repeat CBC with differential in the morning    History of ulcerative colitis  Assessment & Plan  As above    Ankylosing spondylitis (HCC)  Assessment & Plan  Continue biologic from home.  May bring in Home meds.         VTE Prophylaxis: Enoxaparin (Lovenox)  / reason for no mechanical VTE prophylaxis on Lovenox    Code Status: Full code  POLST: POLST form is  not discussed and not completed at this time.    Anticipated Length of Stay:  Patient will be admitted on an Inpatient basis with an anticipated length of stay of  > 2 midnights.   Justification for Hospital Stay: Abdominal pain fecal impaction    Total Time for Visit, including Counseling / Coordination of Care: 45 minutes.  Greater than 50% of this total time spent on direct patient counseling and coordination of care.    Chief Complaint:   Abdominal pain    History of Present Illness:    Nico Cruz is a 29 y.o. male with PMH of UC, underwent multiple surgeries, ankylosing spondylitis who presents with acute onset left-sided abdominal pain with associated nausea started around 8:30 PM last night.  Patient reports he has chronic loose to watery diarrhea about 8 times a day, reports having more frequent diarrhea about 12 times a day in past 5 days.  Reports feeling full in abdomen. Reports last meal was around 10 AM yesterday. Patient denies fever chills.  Denies chest pain, headache, dizziness, SOB.  No other complaints.          Review of Systems:    Review of Systems   Constitutional:  Positive for appetite change.   Gastrointestinal:  Positive for abdominal pain, diarrhea, nausea and vomiting.   All other systems reviewed and are negative.      Past Medical and Surgical History:     Past Medical History:   Diagnosis Date    Ankylosing spondylitis (HCC)     Anxiety     Bowel obstruction (HCC)     Clostridium difficile colitis 9/13/2018    Colitis     Ileal pouchitis (HCC) 9/13/2018    Pancreatitis     Ulcerative colitis (HCC)        Past Surgical History:   Procedure Laterality Date    COLECTOMY TOTAL      with ileal pouch and anastemosis    IR DRAINAGE TUBE PLACEMENT  12/27/2023    IR PICC PLACEMENT SINGLE LUMEN  3/1/2022    TOTAL COLECTOMY         Meds/Allergies:    Prior to Admission medications    Medication Sig Start Date End Date Taking? Authorizing Provider   prochlorperazine (COMPAZINE) 5 mg tablet  Take 1 tablet (5 mg total) by mouth every 6 (six) hours as needed for nausea or vomiting 2/28/24  Yes Smooth Dietz,    Tofacitinib Citrate ER (Xeljanz XR) 11 MG TB24 Take 11 mg by mouth in the morning   Yes Historical Provider, MD   diphenoxylate-atropine (LOMOTIL) 2.5-0.025 mg per tablet Take 1 tablet by mouth 4 (four) times a day as needed for diarrhea for up to 10 doses 12/31/23   Kelly James MD     I have reviewed home medications with patient personally.    Allergies:   Allergies   Allergen Reactions    Cefazolin Hives     Other reaction(s): Arrythmia (Abnormal Heartbeat), Rash Maculopapular    Mesalamine GI Intolerance     Other reaction(s): Pancreatitis  Annotation - [id number]: pancreatitis    Silver Rash     PT REPORTS NO REACTION     Wound Dressings Rash     Coloplast ostomy Products        Social History:     Marital Status: Single   Occupation:   Patient Pre-hospital Living Situation: Family  Patient Pre-hospital Level of Mobility: Independent  Patient Pre-hospital Diet Restrictions: Regular  Substance Use History:   Social History     Substance and Sexual Activity   Alcohol Use Not Currently     Social History     Tobacco Use   Smoking Status Never   Smokeless Tobacco Never     Social History     Substance and Sexual Activity   Drug Use No       Family History:    non-contributory    Physical Exam:     Vitals:   Blood Pressure: 130/80 (05/25/24 0547)  Pulse: 80 (05/25/24 0547)  Temperature: 98.4 °F (36.9 °C) (05/24/24 2302)  Temp Source: Oral (05/24/24 2302)  Respirations: 17 (05/25/24 0547)  SpO2: 95 % (05/25/24 0547)    Physical Exam  Vitals and nursing note reviewed.   Constitutional:       Appearance: He is well-developed.   HENT:      Head: Normocephalic and atraumatic.   Neck:      Thyroid: No thyromegaly.      Vascular: No JVD.      Trachea: No tracheal deviation.   Cardiovascular:      Rate and Rhythm: Normal rate and regular rhythm.      Heart sounds: Normal heart sounds.    Pulmonary:      Effort: Pulmonary effort is normal. No respiratory distress.      Breath sounds: Normal breath sounds. No wheezing or rales.   Abdominal:      General: Bowel sounds are normal. There is no distension.      Palpations: Abdomen is soft.      Tenderness: There is abdominal tenderness. There is no guarding.      Comments:  left side abdomen tender.  Multiple old surgical scars in abdomen   Musculoskeletal:      Cervical back: Neck supple.      Right lower leg: No edema.      Left lower leg: No edema.   Skin:     General: Skin is warm and dry.   Neurological:      General: No focal deficit present.      Mental Status: He is alert and oriented to person, place, and time.   Psychiatric:         Mood and Affect: Mood and affect and mood normal.         Judgment: Judgment normal.      Comments: Patient tearful during exam, emotional.         Additional Data:     Lab Results: I have personally reviewed pertinent reports.      Results from last 7 days   Lab Units 05/24/24  2323   WBC Thousand/uL 13.36*   HEMOGLOBIN g/dL 12.1   HEMATOCRIT % 40.1   PLATELETS Thousands/uL 402*   SEGS PCT % 78*   LYMPHO PCT % 12*   MONO PCT % 8   EOS PCT % 1     Results from last 7 days   Lab Units 05/24/24  2323   POTASSIUM mmol/L 3.6   CHLORIDE mmol/L 102   CO2 mmol/L 22   BUN mg/dL 9   CREATININE mg/dL 1.00   CALCIUM mg/dL 10.3*   ALK PHOS U/L 87   ALT U/L 19   AST U/L 15           Imaging: I have personally reviewed pertinent reports.      CT abdomen pelvis with contrast    Result Date: 5/25/2024  Narrative: CT ABDOMEN AND PELVIS WITH IV CONTRAST INDICATION: Abdominal pain; hx of SBO hx of UC. COMPARISON: Multiple priors most recently 2/9/2024 TECHNIQUE: CT examination of the abdomen and pelvis was performed. Multiplanar 2D reformatted images were created from the source data. This examination, like all CT scans performed in the [facility], was performed utilizing techniques to minimize radiation dose  exposure, including the use of iterative reconstruction and automated exposure control. Radiation dose length product (DLP) for this visit: 524 mGy-cm IV Contrast: 100 mL of iohexol (OMNIPAQUE) Enteric Contrast: Not administered. FINDINGS: ABDOMEN LOWER CHEST: Right lower lobe tree-in-bud pulmonary nodules. LIVER/BILIARY TREE: Unremarkable. GALLBLADDER: No calcified gallstones. No pericholecystic inflammatory change. SPLEEN: Unremarkable. PANCREAS: Unremarkable. ADRENAL GLANDS: Unremarkable. KIDNEYS/URETERS: Unremarkable. No hydronephrosis. STOMACH AND BOWEL: Status post colectomy. Similar appearance of fecal impaction at the rectum with the rectum dilated up to 6.8 cm. Diffuse feculent material throughout the mid to distal small bowel without discrete transition point suggesting slow transit APPENDIX: No findings to suggest appendicitis. ABDOMINOPELVIC CAVITY: No ascites. No pneumoperitoneum. No lymphadenopathy. VESSELS: Unremarkable for patient's age. PELVIS REPRODUCTIVE ORGANS: Unremarkable for patient's age. URINARY BLADDER: Unremarkable. ABDOMINAL WALL/INGUINAL REGIONS: Unremarkable. BONES: No acute fracture or suspicious osseous lesion.     Impression: Fecal impaction at the rectum. Feculent material throughout mildly dilated small bowel loops, without discrete transition point, suggesting ileus/slow transit. Workstation performed: [employer]       EKG, Pathology, and Other Studies Reviewed on Admission:   EKG: NA    Allscripts Records Reviewed: Yes     ** Please Note: Dragon 360 Dictation voice to text software may have been used in the creation of this document. **

## 2024-05-26 VITALS
TEMPERATURE: 98.2 F | DIASTOLIC BLOOD PRESSURE: 62 MMHG | HEART RATE: 87 BPM | RESPIRATION RATE: 16 BRPM | SYSTOLIC BLOOD PRESSURE: 119 MMHG | OXYGEN SATURATION: 99 %

## 2024-05-26 LAB
ANION GAP SERPL CALCULATED.3IONS-SCNC: 6 MMOL/L (ref 4–13)
BASOPHILS # BLD AUTO: 0.09 THOUSANDS/ÂΜL (ref 0–0.1)
BASOPHILS NFR BLD AUTO: 1 % (ref 0–1)
BUN SERPL-MCNC: 7 MG/DL (ref 5–25)
CALCIUM SERPL-MCNC: 8.8 MG/DL (ref 8.4–10.2)
CHLORIDE SERPL-SCNC: 105 MMOL/L (ref 96–108)
CO2 SERPL-SCNC: 24 MMOL/L (ref 21–32)
CREAT SERPL-MCNC: 0.94 MG/DL (ref 0.6–1.3)
EOSINOPHIL # BLD AUTO: 0.27 THOUSAND/ÂΜL (ref 0–0.61)
EOSINOPHIL NFR BLD AUTO: 3 % (ref 0–6)
ERYTHROCYTE [DISTWIDTH] IN BLOOD BY AUTOMATED COUNT: 16 % (ref 11.6–15.1)
GFR SERPL CREATININE-BSD FRML MDRD: 107 ML/MIN/1.73SQ M
GLUCOSE SERPL-MCNC: 83 MG/DL (ref 65–140)
HCT VFR BLD AUTO: 34.7 % (ref 36.5–49.3)
HGB BLD-MCNC: 10.2 G/DL (ref 12–17)
IMM GRANULOCYTES # BLD AUTO: 0.04 THOUSAND/UL (ref 0–0.2)
IMM GRANULOCYTES NFR BLD AUTO: 0 % (ref 0–2)
LYMPHOCYTES # BLD AUTO: 0.79 THOUSANDS/ÂΜL (ref 0.6–4.47)
LYMPHOCYTES NFR BLD AUTO: 8 % (ref 14–44)
MAGNESIUM SERPL-MCNC: 1.9 MG/DL (ref 1.9–2.7)
MCH RBC QN AUTO: 18.5 PG (ref 26.8–34.3)
MCHC RBC AUTO-ENTMCNC: 29.4 G/DL (ref 31.4–37.4)
MCV RBC AUTO: 63 FL (ref 82–98)
MONOCYTES # BLD AUTO: 1.21 THOUSAND/ÂΜL (ref 0.17–1.22)
MONOCYTES NFR BLD AUTO: 12 % (ref 4–12)
NEUTROPHILS # BLD AUTO: 7.59 THOUSANDS/ÂΜL (ref 1.85–7.62)
NEUTS SEG NFR BLD AUTO: 76 % (ref 43–75)
NRBC BLD AUTO-RTO: 0 /100 WBCS
PLATELET # BLD AUTO: 302 THOUSANDS/UL (ref 149–390)
PMV BLD AUTO: 8.4 FL (ref 8.9–12.7)
POTASSIUM SERPL-SCNC: 3.8 MMOL/L (ref 3.5–5.3)
RBC # BLD AUTO: 5.51 MILLION/UL (ref 3.88–5.62)
SODIUM SERPL-SCNC: 135 MMOL/L (ref 135–147)
WBC # BLD AUTO: 9.99 THOUSAND/UL (ref 4.31–10.16)

## 2024-05-26 PROCEDURE — 99239 HOSP IP/OBS DSCHRG MGMT >30: CPT | Performed by: INTERNAL MEDICINE

## 2024-05-26 PROCEDURE — 83735 ASSAY OF MAGNESIUM: CPT | Performed by: INTERNAL MEDICINE

## 2024-05-26 PROCEDURE — 80048 BASIC METABOLIC PNL TOTAL CA: CPT | Performed by: INTERNAL MEDICINE

## 2024-05-26 PROCEDURE — 85025 COMPLETE CBC W/AUTO DIFF WBC: CPT | Performed by: INTERNAL MEDICINE

## 2024-05-26 RX ORDER — POLYETHYLENE GLYCOL 3350 17 G/17G
17 POWDER, FOR SOLUTION ORAL DAILY
Qty: 30 EACH | Refills: 0 | Status: SHIPPED | OUTPATIENT
Start: 2024-05-26

## 2024-05-26 RX ADMIN — DOCUSATE SODIUM 100 MG: 100 CAPSULE, LIQUID FILLED ORAL at 08:26

## 2024-05-26 RX ADMIN — HYDROMORPHONE HYDROCHLORIDE 0.5 MG: 1 INJECTION, SOLUTION INTRAMUSCULAR; INTRAVENOUS; SUBCUTANEOUS at 11:01

## 2024-05-26 RX ADMIN — ENOXAPARIN SODIUM 40 MG: 40 INJECTION SUBCUTANEOUS at 08:26

## 2024-05-26 RX ADMIN — HYDROMORPHONE HYDROCHLORIDE 0.5 MG: 1 INJECTION, SOLUTION INTRAMUSCULAR; INTRAVENOUS; SUBCUTANEOUS at 07:44

## 2024-05-26 RX ADMIN — ONDANSETRON 4 MG: 2 INJECTION INTRAMUSCULAR; INTRAVENOUS at 11:01

## 2024-05-26 RX ADMIN — HYDROMORPHONE HYDROCHLORIDE 0.5 MG: 1 INJECTION, SOLUTION INTRAMUSCULAR; INTRAVENOUS; SUBCUTANEOUS at 04:22

## 2024-05-26 RX ADMIN — FAMOTIDINE 20 MG: 10 INJECTION, SOLUTION INTRAVENOUS at 09:29

## 2024-05-26 NOTE — PLAN OF CARE
Problem: PAIN - ADULT  Goal: Verbalizes/displays adequate comfort level or baseline comfort level  Description: Interventions:  - Encourage patient to monitor pain and request assistance  - Assess pain using appropriate pain scale  - Administer analgesics based on type and severity of pain and evaluate response  - Implement non-pharmacological measures as appropriate and evaluate response  - Consider cultural and social influences on pain and pain management  - Notify physician/advanced practitioner if interventions unsuccessful or patient reports new pain  Outcome: Progressing     Problem: Knowledge Deficit  Goal: Patient/family/caregiver demonstrates understanding of disease process, treatment plan, medications, and discharge instructions  Description: Complete learning assessment and assess knowledge base.  Interventions:  - Provide teaching at level of understanding  - Provide teaching via preferred learning methods  Outcome: Progressing     Problem: METABOLIC, FLUID AND ELECTROLYTES - ADULT  Goal: Electrolytes maintained within normal limits  Description: INTERVENTIONS:  - Monitor labs and assess patient for signs and symptoms of electrolyte imbalances  - Administer electrolyte replacement as ordered  - Monitor response to electrolyte replacements, including repeat lab results as appropriate  - Instruct patient on fluid and nutrition as appropriate  Outcome: Progressing

## 2024-05-26 NOTE — ASSESSMENT & PLAN NOTE
Present on admission, as evidenced by leukocytosis, tachycardia, with no clear evidence of infection.  Patient was started on Rocephin and Flagyl initially which were later discontinued   procalcitonin level negative.  White count has normalized  Likely stress response in the setting of fecal impaction

## 2024-05-26 NOTE — UTILIZATION REVIEW
NOTIFICATION OF INPATIENT ADMISSION   AUTHORIZATION REQUEST   SERVICING FACILITY:   [place] [address]  Tax ID: [id number]  NPI: [id number] ATTENDING PROVIDER:  Attending Name and NPI#: Kendra Morris Md [[id number]]  Address: [address] [place] [address]  Phone: [phone]   ADMISSION INFORMATION:  Place of Service: Inpatient [facility]  Place of Service Code: 21  Inpatient Admission Date/Time: 5/25/24  3:58 AM  Discharge Date/Time: No discharge date for patient encounter.  Admitting Diagnosis Code/Description:  Fecal impaction (HCC) [K56.41]  Ulcerative colitis (HCC) [K51.90]  Abdominal pain [R10.9]  Pain in the abdomen [R10.9]     UTILIZATION REVIEW CONTACT:  Zenobia Overton Utilization   Network Utilization Review Department  Phone: [phone]  Fax [phone]  Email: [email or web address]  Contact for approvals/pending authorizations, clinical reviews, and discharge.     PHYSICIAN ADVISORY SERVICES:  Medical Necessity Denial & Xrnk-li-Fwqi Review  Phone: [phone]  Fax: [phone]  Email: [email or web address]     DISCHARGE SUPPORT TEAM:  For Patients Discharge Needs & Updates  Phone: [phone] opt. 2 Fax: [phone]  Email: [email or web address]

## 2024-05-26 NOTE — ASSESSMENT & PLAN NOTE
Patient presents with acute onset left-sided abdominal pain with associated nausea started around 8:30 PM last night.  Reports has chronic loose to watery diarrhea about 8 times a day, reports having more frequent diarrhea about 12 times a day in past 5 days.  Chart reviewed. History of UC, status post total 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 stricture with ileostomy creation in October 2022.  Ileostomy was taken down in 12/2023.  Patient follows colorectal surgery in [facility].   CT abdomen pelvis with IV contrast showed - Fecal impaction at the rectum. Feculent material throughout mildly dilated small bowel loops, without discrete transition point, suggesting ileus/slow transit.  WBC 13.36, no bands, patient afebrile.  Fleet enema was given in the ED and had a very small bowel movement.  Could not tolerate another Fleet enema  GI input appreciated  Patient was given MiraLAX bowel preparation and is having regular bowel movements with improved abdominal pain.  Patient to be discharged on MiraLAX

## 2024-05-26 NOTE — DISCHARGE SUMMARY
[facility]  Discharge- Nico ANTONIO Cruz 1995, 29 y.o. male MRN: [id number]  Unit/Bed#: [address] [place] [address] Encounter: [id number]  Primary Care Provider: Smooth Dietz DO   Date and time admitted to hospital: 5/24/2024 10:59 PM    * Pain in the abdomen  Assessment & Plan  Patient presents with acute onset left-sided abdominal pain with associated nausea started around 8:30 PM last night.  Reports has chronic loose to watery diarrhea about 8 times a day, reports having more frequent diarrhea about 12 times a day in past 5 days.  Chart reviewed. History of UC, status post total 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 stricture with ileostomy creation in October 2022.  Ileostomy was taken down in 12/2023.  Patient follows colorectal surgery in [facility].   CT abdomen pelvis with IV contrast showed - Fecal impaction at the rectum. Feculent material throughout mildly dilated small bowel loops, without discrete transition point, suggesting ileus/slow transit.  WBC 13.36, no bands, patient afebrile.  Fleet enema was given in the ED and had a very small bowel movement.  Could not tolerate another Fleet enema  GI input appreciated  Patient was given MiraLAX bowel preparation and is having regular bowel movements with improved abdominal pain.  Patient to be discharged on MiraLAX    SIRS (systemic inflammatory response syndrome) (HCC)  Assessment & Plan  Present on admission, as evidenced by leukocytosis, tachycardia, with no clear evidence of infection.  Patient was started on Rocephin and Flagyl initially which were later discontinued   procalcitonin level negative.  White count has normalized  Likely stress response in the setting of fecal impaction    Fecal impaction (HCC)  Assessment & Plan  Management as above  Consult GI  Patient to be discharged on MiraLAX 17 g p.o. daily/twice daily    Electrolyte abnormality  Assessment & Plan  Patient noted to have  hypokalemia and hypomagnesemia which were repleted and normalized    Ankylosing spondylitis (HCC)  Assessment & Plan  Continue biologic from home.  May bring in Home meds.    History of ulcerative colitis  Assessment & Plan  As above        Medical Problems       Resolved Problems  Date Reviewed: 5/26/2024   None       Discharging Physician / Practitioner: Kendra Morris MD  PCP: Smooth Dietz DO  Admission Date:   Admission Orders (From admission, onward)       Ordered        05/25/24 0358  INPATIENT ADMISSION  Once                          Discharge Date: 05/26/24    Consultations During Hospital Stay:  Gastroenterology    Significant Findings / Test Results:   CT abdomen pelvis with fecal impaction at the rectum, feculent material throughout mildly dilated small bowel loops suggesting ileus/show transit     Outpatient Tests Requested:  Follow-up with GI as needed    Complications: None    Reason for Admission: Abdominal pain    Hospital Course:   Nico Cruz is a 29 y.o. male patient with past medical history of ulcerative colitis status post multiple surgeries, ankylosing spondylitis who originally presented to the hospital on 5/24/2024 due to abdominal pain with nausea and vomiting.  In the ED patient had low potassium and low magnesium and CAT scan showed fecal impaction with feculent material throughout dilated small bowel loops.  Patient was given an enema with a very small bowel movement.  Later patient was seen by GI and was given MiraLAX bowel preparation and started having good bowel movements with improved pain.  Patient also met SIRS criteria but did not have any clear source of infection.  Patient's diet was slowly advanced from clear liquids to regular diet which she was tolerating well.  Patient to be discharged home on MiraLAX with outpatient follow-up with GI.      Please see above list of diagnoses and related plan for additional information.     Condition at Discharge:  fair    Discharge Day Visit / Exam:   Subjective: Patient has some abdominal cramping.  Patient had a good solid bowel movement and later multiple liquid bowel movements as per RN.  No nausea or vomiting  Vitals: Blood Pressure: 119/62 (05/26/24 0740)  Pulse: 87 (05/26/24 0740)  Temperature: 98.2 °F (36.8 °C) (05/26/24 0740)  Temp Source: Oral (05/24/24 2302)  Respirations: 16 (05/26/24 0740)  SpO2: 99 % (05/26/24 0740)  Exam:   Physical Exam  Constitutional:       Appearance: Normal appearance.   HENT:      Head: Normocephalic and atraumatic.   Eyes:      Extraocular Movements: Extraocular movements intact.      Pupils: Pupils are equal, round, and reactive to light.   Cardiovascular:      Rate and Rhythm: Normal rate and regular rhythm.      Heart sounds: No murmur heard.     No gallop.   Pulmonary:      Effort: Pulmonary effort is normal.      Breath sounds: Normal breath sounds.   Abdominal:      General: Bowel sounds are normal.      Palpations: Abdomen is soft.      Tenderness: There is no abdominal tenderness.   Musculoskeletal:         General: No swelling or deformity. Normal range of motion.      Cervical back: Normal range of motion and neck supple.   Skin:     General: Skin is warm and dry.   Neurological:      General: No focal deficit present.      Mental Status: He is alert.         Discharge instructions/Information to patient and family:   See after visit summary for information provided to patient and family.      Provisions for Follow-Up Care:  See after visit summary for information related to follow-up care and any pertinent home health orders.      Mobility at time of Discharge:   Basic Mobility Inpatient Raw Score: 23  JH-HLM Goal: 7: Walk 25 feet or more  JH-HLM Achieved: 7: Walk 25 feet or more  HLM Goal achieved. Continue to encourage appropriate mobility.     Disposition:   Home    Planned Readmission: No     Discharge Statement:  I spent 35 minutes discharging the patient. This time was  spent on the day of discharge. I had direct contact with the patient on the day of discharge. Greater than 50% of the total time was spent examining patient, answering all patient questions, arranging and discussing plan of care with patient as well as directly providing post-discharge instructions.  Additional time then spent on discharge activities.    Discharge Medications:  See after visit summary for reconciled discharge medications provided to patient and/or family.      **Please Note: This note may have been constructed using a voice recognition system**

## 2024-05-27 ENCOUNTER — TELEPHONE (OUTPATIENT)
Dept: FAMILY MEDICINE CLINIC | Facility: CLINIC | Age: 29
End: 2024-05-27

## 2024-05-27 NOTE — TELEPHONE ENCOUNTER
----- Message from Kendra Morris MD sent at 5/26/2024  3:27 PM EDT -----  Thank you for allowing us to participate in the care of your patient, Nico Cruz, who was hospitalized from 5/24/2024 through 5/26/2024 with the admitting diagnosis of Charli pain secondary to fecal impaction.  Patient received MiraLAX bowel prep with good bowel movements.  Patient was seen by GI.    Medication Changes:  MiraLAX 17 g p.o. daily/twice daily    Outpatient testing recommended:  Follow-up with GI    If you have any additional questions or would like to discuss further, please feel free to contact me.    Kendra Morris MD  [facility] Internal Medicine, Hospitalist  [phone]

## 2024-05-28 ENCOUNTER — TRANSITIONAL CARE MANAGEMENT (OUTPATIENT)
Dept: FAMILY MEDICINE CLINIC | Facility: CLINIC | Age: 29
End: 2024-05-28

## 2024-05-28 DIAGNOSIS — Z71.89 COORDINATION OF COMPLEX CARE: Primary | ICD-10-CM

## 2024-05-29 ENCOUNTER — PATIENT OUTREACH (OUTPATIENT)
Dept: FAMILY MEDICINE CLINIC | Facility: CLINIC | Age: 29
End: 2024-05-29

## 2024-05-29 ENCOUNTER — RA CDI HCC (OUTPATIENT)
Dept: OTHER | Facility: HOSPITAL | Age: 29
End: 2024-05-29

## 2024-05-29 NOTE — PROGRESS NOTES
HRR referral received via IB. Chart reviewed. Patient recently admitted to  at [facility] 5/24-5/26 with acute onset of left sided abdominal pain w/nausea and watery diarrhea 12 x per day x 5 days. CT showed fecal impaction in rectum. Feculent material throughout mildly dilated small bowel loops, without discrete transition point, suggesting ileus/slow transit. Patient with h/o Ulcerative Colitis and s/p 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 stricture with ileostomy creation 10/2022. Patient follows with Colorectal Surgery at [facility].     Patient discharged home on Miralax BID prn. No other medication changes.    PCP [facility] apt scheduled for 6/5.    This RNCM called patient. There was no answer and a detailed message was left with a request for a call back.

## 2024-05-29 NOTE — PROGRESS NOTES
HCC coding opportunities       Chart reviewed, no opportunity found: CHART REVIEWED, NO OPPORTUNITY FOUND        Patients Insurance        Commercial Insurance: [employer]

## 2024-05-30 ENCOUNTER — TRANSCRIPTION ENCOUNTER (OUTPATIENT)
Age: 29
End: 2024-05-30

## 2024-05-30 ENCOUNTER — APPOINTMENT (OUTPATIENT)
Dept: COLORECTAL SURGERY | Facility: CLINIC | Age: 29
End: 2024-05-30

## 2024-05-30 PROBLEM — Z00.00 ENCOUNTER FOR PREVENTIVE HEALTH EXAMINATION: Status: ACTIVE | Noted: 2024-05-30

## 2024-05-30 NOTE — HISTORY OF PRESENT ILLNESS
[FreeTextEntry1] : History of redo pouch had constipation and distention, last friday, went to ER, CT done showed SBO vs ileus Now having some BMs Denies nausea or vomiting, or abdominal pain

## 2024-05-30 NOTE — ASSESSMENT
[FreeTextEntry1] : history is redo pouch possible anal stricture Needs EUA with possible anal dilation, pouchoscopy

## 2024-05-31 ENCOUNTER — PATIENT OUTREACH (OUTPATIENT)
Dept: FAMILY MEDICINE CLINIC | Facility: CLINIC | Age: 29
End: 2024-05-31

## 2024-05-31 NOTE — PROGRESS NOTES
Chart reviewed and this RNCM attempted to call patient. There was no answer and a detailed message was left with a request for a call back.      Outreach #2 and an UTR letter was sent via My Chart.    This RNCM removed self from care team.  
Dysthymia   F34.1  Mild alcohol use disorder   F10.10  Cannabis abuse   F12.10  Autism spectrum disorder   F84.0  Social anxiety disorder   F40.10

## 2024-05-31 NOTE — LETTER
Date: 05/31/24  Dear Nicoyanelis Cruz,   My name is Tricia; I am a registered nurse care manager working with Dr pulliam's office.  I have not been able to reach you and would like to set a time that I can talk with you over the phone.  My work is to help patients that have complex medical conditions get the care they need. This includes patients who may have been in the hospital or emergency room.    Please call me with any questions you may have. I look forward to speaking with you.  Sincerely,  Tricia DUMONT RNCM  [phone]  Outpatient Care Manager

## 2024-06-02 PROBLEM — L02.211 ABDOMINAL WALL ABSCESS: Status: RESOLVED | Noted: 2023-12-26 | Resolved: 2024-06-02

## 2024-06-02 PROBLEM — R65.10 SIRS (SYSTEMIC INFLAMMATORY RESPONSE SYNDROME) (HCC): Status: RESOLVED | Noted: 2024-05-25 | Resolved: 2024-06-02

## 2024-06-02 PROBLEM — R19.7 NAUSEA VOMITING AND DIARRHEA: Status: RESOLVED | Noted: 2023-01-18 | Resolved: 2024-06-02

## 2024-06-02 PROBLEM — R10.31 ABDOMINAL PAIN, RLQ: Status: RESOLVED | Noted: 2023-12-19 | Resolved: 2024-06-02

## 2024-06-02 PROBLEM — R11.2 NAUSEA VOMITING AND DIARRHEA: Status: RESOLVED | Noted: 2023-01-18 | Resolved: 2024-06-02

## 2024-06-02 PROBLEM — I33.0 INFECTIVE ENDOCARDITIS: Status: RESOLVED | Noted: 2022-03-08 | Resolved: 2024-06-02

## 2024-06-02 PROBLEM — D64.9 ANEMIA: Status: RESOLVED | Noted: 2022-02-25 | Resolved: 2024-06-02

## 2024-06-05 ENCOUNTER — OUTPATIENT (OUTPATIENT)
Dept: OUTPATIENT SERVICES | Facility: HOSPITAL | Age: 29
LOS: 1 days | End: 2024-06-05
Payer: COMMERCIAL

## 2024-06-05 ENCOUNTER — TRANSCRIPTION ENCOUNTER (OUTPATIENT)
Age: 29
End: 2024-06-05

## 2024-06-05 VITALS
HEART RATE: 82 BPM | HEIGHT: 66.14 IN | RESPIRATION RATE: 20 BRPM | DIASTOLIC BLOOD PRESSURE: 78 MMHG | OXYGEN SATURATION: 98 % | SYSTOLIC BLOOD PRESSURE: 107 MMHG | TEMPERATURE: 98 F | WEIGHT: 167.99 LBS

## 2024-06-05 DIAGNOSIS — Z90.49 ACQUIRED ABSENCE OF OTHER SPECIFIED PARTS OF DIGESTIVE TRACT: Chronic | ICD-10-CM

## 2024-06-05 DIAGNOSIS — Z98.890 OTHER SPECIFIED POSTPROCEDURAL STATES: Chronic | ICD-10-CM

## 2024-06-05 DIAGNOSIS — Z87.19 PERSONAL HISTORY OF OTHER DISEASES OF THE DIGESTIVE SYSTEM: ICD-10-CM

## 2024-06-05 DIAGNOSIS — Z01.818 ENCOUNTER FOR OTHER PREPROCEDURAL EXAMINATION: ICD-10-CM

## 2024-06-05 DIAGNOSIS — K91.858 OTHER COMPLICATIONS OF INTESTINAL POUCH: ICD-10-CM

## 2024-06-05 LAB
ANION GAP SERPL CALC-SCNC: 16 MMOL/L — SIGNIFICANT CHANGE UP (ref 5–17)
BUN SERPL-MCNC: 10 MG/DL — SIGNIFICANT CHANGE UP (ref 7–23)
CALCIUM SERPL-MCNC: 9.9 MG/DL — SIGNIFICANT CHANGE UP (ref 8.4–10.5)
CHLORIDE SERPL-SCNC: 103 MMOL/L — SIGNIFICANT CHANGE UP (ref 96–108)
CO2 SERPL-SCNC: 22 MMOL/L — SIGNIFICANT CHANGE UP (ref 22–31)
CREAT SERPL-MCNC: 0.89 MG/DL — SIGNIFICANT CHANGE UP (ref 0.5–1.3)
EGFR: 118 ML/MIN/1.73M2 — SIGNIFICANT CHANGE UP
GLUCOSE SERPL-MCNC: 101 MG/DL — HIGH (ref 70–99)
HCT VFR BLD CALC: 36.2 % — LOW (ref 39–50)
HGB BLD-MCNC: 10.9 G/DL — LOW (ref 13–17)
MCHC RBC-ENTMCNC: 18.4 PG — LOW (ref 27–34)
MCHC RBC-ENTMCNC: 30.1 GM/DL — LOW (ref 32–36)
MCV RBC AUTO: 61 FL — LOW (ref 80–100)
NRBC # BLD: 0 /100 WBCS — SIGNIFICANT CHANGE UP (ref 0–0)
PLATELET # BLD AUTO: 525 K/UL — HIGH (ref 150–400)
POTASSIUM SERPL-MCNC: 3.9 MMOL/L — SIGNIFICANT CHANGE UP (ref 3.5–5.3)
POTASSIUM SERPL-SCNC: 3.9 MMOL/L — SIGNIFICANT CHANGE UP (ref 3.5–5.3)
RBC # BLD: 5.93 M/UL — HIGH (ref 4.2–5.8)
RBC # FLD: 16.3 % — HIGH (ref 10.3–14.5)
SODIUM SERPL-SCNC: 141 MMOL/L — SIGNIFICANT CHANGE UP (ref 135–145)
WBC # BLD: 9 K/UL — SIGNIFICANT CHANGE UP (ref 3.8–10.5)
WBC # FLD AUTO: 9 K/UL — SIGNIFICANT CHANGE UP (ref 3.8–10.5)

## 2024-06-05 PROCEDURE — 85027 COMPLETE CBC AUTOMATED: CPT

## 2024-06-05 PROCEDURE — G0463: CPT

## 2024-06-05 PROCEDURE — 80048 BASIC METABOLIC PNL TOTAL CA: CPT

## 2024-06-05 RX ORDER — LIDOCAINE HCL 20 MG/ML
0.2 VIAL (ML) INJECTION ONCE
Refills: 0 | Status: DISCONTINUED | OUTPATIENT
Start: 2024-06-06 | End: 2024-06-20

## 2024-06-05 NOTE — H&P PST ADULT - GIT GEN HX ROS MEA POS PC
[facility] LABOR AND DELIVERY PROGRESS NOTE    PATIENT ID:  NAME:  Yousuf Miller  MRN:               [id number]  YOB: 1983     34 y.o. female  at 40w5d.    Subjective: Pt is resting comfortable at this time she states she feels period like cramps       Objective:    Vitals:    18 2204 18 2219 18 2347 18 0047   BP: 127/84 131/87 134/79 117/69   Pulse: 82 87 97 96   Resp:       Temp:    36.7 °C (98 °F)   Weight:       Height:           Cervix:  2cm/70%/-1 much more anterior now  [place]: Uterine Contractions Q4-6 minutes. 60 seconds long   FHRM: Baseline 140, mod variability, Accels +,  No decels  Pitocin: none   Pain control:  none    Assessment: 34 y.o. female    at 40w5d.    Plan:   1. Labor: early  2. IUP:  EFW 3800 gm, Category 1 tracing, vertex presentation.  GBS neg  3. Anticipate   4. Since kya every 4-6 minutes and has had some cervical change will start Pitocin as soon as RN available to do so   difficulty with BM/nausea/constipation/abdominal pain

## 2024-06-05 NOTE — H&P PST ADULT - ASSESSMENT
Airway:  normal    Mallampati-       Dental: Patient denies loose teeth    Activity : Active , work as    dasi mets : 8 dasi mets        Airway:  normal    Mallampati- 3      Dental: Patient denies loose teeth    Activity : Active , work as    dasi mets : 8 dasi mets

## 2024-06-05 NOTE — H&P PST ADULT - NSANTHOSAYNRD_GEN_A_CORE
No. MATTHWE screening performed.  STOP BANG Legend: 0-2 = LOW Risk; 3-4 = INTERMEDIATE Risk; 5-8 = HIGH Risk

## 2024-06-05 NOTE — H&P PST ADULT - NSICDXPASTMEDICALHX_GEN_ALL_CORE_FT
PAST MEDICAL HISTORY:  H/O ankylosing spondylitis     H/O ulcerative colitis     History of sacroiliac joint dysfunction      PAST MEDICAL HISTORY:  H/O ankylosing spondylitis     H/O ulcerative colitis     History of fecal impaction     History of pancreatitis     History of sacroiliac joint dysfunction     SBO (small bowel obstruction)

## 2024-06-05 NOTE — H&P PST ADULT - HISTORY OF PRESENT ILLNESS
29 yr old male with history of Ulcerative Colitis - s/p total abdominal colectomy with diverting loop ileostomy , J pouch in 2015,  29 yr old male with history of Ulcerative Colitis - s/p total abdominal colectomy with diverting loop ileostomy , J pouch in 2015, c/b multiple small bowel obstruction , c/b proximal visceral evisceration requiring revision , wash down take down of end ileostomy , small bowel resection, creation of stapled ileal pouch anal anastomosis and creation of diverting loop ileostomy c/b ileoanal anastomosis stricture - s/p pouchoscopy with needle knife fistulotomy / dilation/ Axion on 10/5/2023 and pouchoscopy 11/17/2023. H/o Upper Gi dilatation 2023. s/p ileostomy closure - 12/2023. Pt with recent history of abdominal pain - 5/31/2023- found to have fecal impaction - had bowel prep .Pt with c/o nausea, difficulty with BM, Abdominal cramping . Now coming in for Exam under Anesthesia , Pouchoscopy, Possible anal dilatation on 6/6/2024.

## 2024-06-06 ENCOUNTER — APPOINTMENT (OUTPATIENT)
Dept: COLORECTAL SURGERY | Facility: HOSPITAL | Age: 29
End: 2024-06-06

## 2024-06-06 ENCOUNTER — APPOINTMENT (OUTPATIENT)
Dept: COLORECTAL SURGERY | Facility: CLINIC | Age: 29
End: 2024-06-06

## 2024-06-06 ENCOUNTER — TRANSCRIPTION ENCOUNTER (OUTPATIENT)
Age: 29
End: 2024-06-06

## 2024-06-06 ENCOUNTER — OUTPATIENT (OUTPATIENT)
Dept: OUTPATIENT SERVICES | Facility: HOSPITAL | Age: 29
LOS: 1 days | End: 2024-06-06
Payer: COMMERCIAL

## 2024-06-06 VITALS
HEIGHT: 66.14 IN | DIASTOLIC BLOOD PRESSURE: 61 MMHG | OXYGEN SATURATION: 97 % | SYSTOLIC BLOOD PRESSURE: 103 MMHG | TEMPERATURE: 98 F | HEART RATE: 72 BPM | RESPIRATION RATE: 16 BRPM | WEIGHT: 167.99 LBS

## 2024-06-06 VITALS
OXYGEN SATURATION: 100 % | RESPIRATION RATE: 17 BRPM | SYSTOLIC BLOOD PRESSURE: 118 MMHG | DIASTOLIC BLOOD PRESSURE: 72 MMHG | HEART RATE: 64 BPM

## 2024-06-06 DIAGNOSIS — K91.858 OTHER COMPLICATIONS OF INTESTINAL POUCH: ICD-10-CM

## 2024-06-06 DIAGNOSIS — Z90.49 ACQUIRED ABSENCE OF OTHER SPECIFIED PARTS OF DIGESTIVE TRACT: Chronic | ICD-10-CM

## 2024-06-06 DIAGNOSIS — Z98.890 OTHER SPECIFIED POSTPROCEDURAL STATES: Chronic | ICD-10-CM

## 2024-06-06 PROCEDURE — 44385 ENDOSCOPY OF BOWEL POUCH: CPT

## 2024-06-06 PROCEDURE — 46604 ANOSCOPY AND DILATION: CPT

## 2024-06-06 PROCEDURE — 45905 DILATION OF ANAL SPHINCTER: CPT

## 2024-06-06 RX ORDER — PROCHLORPERAZINE MALEATE 5 MG
1 TABLET ORAL
Refills: 0 | DISCHARGE

## 2024-06-06 RX ORDER — TOFACITINIB 11 MG/1
1 TABLET, FILM COATED, EXTENDED RELEASE ORAL
Refills: 0 | DISCHARGE

## 2024-06-06 RX ORDER — SODIUM CHLORIDE 9 MG/ML
3 INJECTION INTRAMUSCULAR; INTRAVENOUS; SUBCUTANEOUS EVERY 8 HOURS
Refills: 0 | Status: DISCONTINUED | OUTPATIENT
Start: 2024-06-06 | End: 2024-06-06

## 2024-06-06 RX ORDER — FENTANYL CITRATE 50 UG/ML
25 INJECTION INTRAVENOUS
Refills: 0 | Status: DISCONTINUED | OUTPATIENT
Start: 2024-06-06 | End: 2024-06-06

## 2024-06-06 RX ADMIN — Medication 1 ENEMA: at 10:18

## 2024-06-06 RX ADMIN — Medication 1 ENEMA: at 10:32

## 2024-06-06 NOTE — BRIEF OPERATIVE NOTE - OPERATION/FINDINGS
Exam under anesthesia. Perianal irritation. Anal stricture appreciated, dilated to width of index finger. Flexible pouchoscopy. Scope advanced to 50 cm. No evidence of obstruction. Healthy appearing pouch.

## 2024-06-06 NOTE — PRE-OP CHECKLIST - 1.
Gastroparesis   WHAT YOU NEED TO KNOW:   What is gastroparesis? Gastroparesis is a condition that causes food to move more slowly than normal from the stomach to the intestines  Gastroparesis is not caused by blockage  Often, the cause may not be known  It may be caused by damage to a nerve that controls muscles used to move food to your small intestines  What puts me at risk for gastroparesis? · Diabetes for 10 years or more    · Hypothyroidism     · Gastric or antireflux surgery    · GERD    · Past viral infection     · Medicines such as narcotics and some medicines used to treat diabetes    · Conditions that affect your nervous system, such as Parkinson disease    · Conditions that affect your connective tissue, such as scleroderma  What are the signs and symptoms of gastroparesis? · Nausea and vomiting    · Feeling full sooner than normal or after eating less than usual    · Abdominal bloating and pain    · Weight loss or poor nutrition     · Frequent changes in blood sugar  How is gastroparesis diagnosed? You may need any of the following tests:  · Blood tests  will show your blood counts and electrolyte (mineral) levels  · Gastric emptying scintigraphy (GES)  measures how quickly food moves through your stomach and into your intestine  A material is put into scrambled eggs and the eggs are eaten  Pictures of the material are taken as it travels through the stomach and into the small intestine  · A SmartPill test  may be done to measure changes in pH and pressure in the gastrointestinal (GI) tract  You will need to swallow a capsule that transmits radio waves and records the measurements  · A breath test  is done by eating eggs with carbon in them  Breath samples are taken over a period of hours  The amount of carbon is measured in exhaled air  This shows how fast the stomach is emptying  · An endoscopy  may be done to find problems in the esophagus or stomach   A small, thin tube with a camera and a light will be inserted into your esophagus and stomach  · An x-ray or CT scan  may show problems in your stomach  You may be given contrast liquid to help your stomach show up better in the pictures  Tell the healthcare provider if you have ever had an allergic reaction to contrast liquid  How is gastroparesis treated? · Medicines  may be given to control your nausea and vomiting  You may receive medicines that help food move through your stomach at a more normal rate  · Nutrition support  may be given as liquid supplements  You may need to see a dietitian for help with a nutrition plan  · Gastric electrical stimulation (GES)  sends electrical pulses to the nerves in your stomach to help food move through to your intestines  It may also help control nausea and vomiting  GES is done when symptoms cannot be controlled with other treatments  · Surgery  may be needed if other treatments do not work  You may need surgery to place a feeding tube through your skin and into your small intestine  What else can I do to manage gastroparesis? Your healthcare provider may suggest any of the following:  · Change your eating habits  Take small bites of food to make it easier for your body to digest  You may need to eat several small meals low in fiber and fat throughout the day  Ask your dietitian for help with planning meals  · Do not eat raw fruits, vegetables, or whole grains  These can cause you to have undigested food in your stomach  The undigested food can form a blockage that can become life-threatening  · Drink liquids as directed  Liquids will prevent dehydration caused by vomiting  Slowly drink small amounts of liquids at a time  Ask your healthcare provider how much liquid to drink each day, and which liquids are best for you  You may also need to drink an oral rehydration solution (ORS)   An ORS has the right amounts of sugar, salt, and minerals in water to replace body fluids  · Do not lie down for 2 hours after your meals  Walking and sitting after meals help with digestion  · Control your blood sugar levels if you have diabetes  High blood sugar levels may make your symptoms worse  Ask your healthcare provider how to control your blood sugar levels  You or someone else should call 911 for any of the following:   · Your heart is beating faster and you are breathing faster than usual     · You cannot be woken up  When should I seek immediate care? · You are confused or have trouble thinking clearly  · You are dizzy or very drowsy  When should I contact my healthcare provider? · You are urinating less than usual      · Your symptoms return or become worse  · The color of your urine is dark yellow  · You have questions or concerns about your condition or care  CARE AGREEMENT:   You have the right to help plan your care  Learn about your health condition and how it may be treated  Discuss treatment options with your caregivers to decide what care you want to receive  You always have the right to refuse treatment  The above information is an  only  It is not intended as medical advice for individual conditions or treatments  Talk to your doctor, nurse or pharmacist before following any medical regimen to see if it is safe and effective for you  © 2017 [employer]  Information is for End User's use only and may not be sold, redistributed or otherwise used for commercial purposes  All illustrations and images included in CareNotes® are the copyrighted property of [employer]  or Ronak Cordero  patient oriented to unit

## 2024-06-06 NOTE — ASU DISCHARGE PLAN (ADULT/PEDIATRIC) - CARE PROVIDER_API CALL
Fermín Woodard  Colon/Rectal Surgery  [address] [place] [id number]  Phone: [phone]  Fax: [phone]  Established Patient  Follow Up Time: Routine

## 2024-06-06 NOTE — ASU DISCHARGE PLAN (ADULT/PEDIATRIC) - NS MD DC FALL RISK RISK
For information on Fall & Injury Prevention, visit: [email or web address] OR  [email or web address] OR  https://www.cdc.gov/steadi/patient.html

## 2024-06-06 NOTE — PATIENT PROFILE ADULT - FALL HARM RISK - UNIVERSAL INTERVENTIONS
Bed in lowest position, wheels locked, appropriate side rails in place/Call bell, personal items and telephone in reach/Instruct patient to call for assistance before getting out of bed or chair/Non-slip footwear when patient is out of bed/[place] to call system/Physically safe environment - no spills, clutter or unnecessary equipment/Purposeful Proactive Rounding/Room/bathroom lighting operational, light cord in reach

## 2024-06-07 ENCOUNTER — TELEPHONE (OUTPATIENT)
Dept: FAMILY MEDICINE CLINIC | Facility: CLINIC | Age: 29
End: 2024-06-07

## 2024-06-10 ENCOUNTER — APPOINTMENT (OUTPATIENT)
Dept: COLORECTAL SURGERY | Facility: HOSPITAL | Age: 29
End: 2024-06-10

## 2024-06-10 PROBLEM — Z87.19 PERSONAL HISTORY OF OTHER DISEASES OF THE DIGESTIVE SYSTEM: Chronic | Status: ACTIVE | Noted: 2024-06-05

## 2024-06-10 PROBLEM — Z87.39 PERSONAL HISTORY OF OTHER DISEASES OF THE MUSCULOSKELETAL SYSTEM AND CONNECTIVE TISSUE: Chronic | Status: ACTIVE | Noted: 2024-06-05

## 2024-06-10 PROBLEM — K91.850 ILEAL POUCHITIS (HCC): Status: RESOLVED | Noted: 2018-09-13 | Resolved: 2024-06-10

## 2024-06-10 PROBLEM — R56.9 SEIZURE-LIKE ACTIVITY (HCC): Status: RESOLVED | Noted: 2021-02-17 | Resolved: 2024-06-10

## 2024-06-10 PROBLEM — K91.858 COMPLICATIONS OF INTESTINAL POUCH (HCC): Status: RESOLVED | Noted: 2019-03-22 | Resolved: 2024-06-10

## 2024-06-10 PROBLEM — K56.600 PARTIAL SMALL BOWEL OBSTRUCTION (HCC): Status: RESOLVED | Noted: 2022-07-06 | Resolved: 2024-06-10

## 2024-06-10 PROBLEM — K56.609 UNSPECIFIED INTESTINAL OBSTRUCTION, UNSPECIFIED AS TO PARTIAL VERSUS COMPLETE OBSTRUCTION: Chronic | Status: ACTIVE | Noted: 2024-06-05

## 2024-06-27 ENCOUNTER — TELEPHONE (OUTPATIENT)
Age: 29
End: 2024-06-27

## 2024-06-27 ENCOUNTER — OFFICE VISIT (OUTPATIENT)
Dept: FAMILY MEDICINE CLINIC | Facility: CLINIC | Age: 29
End: 2024-06-27
Payer: COMMERCIAL

## 2024-06-27 VITALS
DIASTOLIC BLOOD PRESSURE: 70 MMHG | SYSTOLIC BLOOD PRESSURE: 116 MMHG | HEIGHT: 69 IN | OXYGEN SATURATION: 98 % | TEMPERATURE: 97.6 F | RESPIRATION RATE: 18 BRPM | BODY MASS INDEX: 25.92 KG/M2 | WEIGHT: 175 LBS | HEART RATE: 81 BPM

## 2024-06-27 DIAGNOSIS — M45.0 ANKYLOSING SPONDYLITIS OF MULTIPLE SITES IN SPINE (HCC): Primary | ICD-10-CM

## 2024-06-27 PROCEDURE — 99214 OFFICE O/P EST MOD 30 MIN: CPT | Performed by: FAMILY MEDICINE

## 2024-06-27 RX ORDER — PREDNISONE 10 MG/1
TABLET ORAL
Qty: 30 TABLET | Refills: 0 | Status: SHIPPED | OUTPATIENT
Start: 2024-06-27 | End: 2024-07-09

## 2024-06-27 NOTE — TELEPHONE ENCOUNTER
PT is on Day 3 of steroids and since its a tapered dose today he is limping around work and he can't increase the dose or he wont have enough for tomorrow. Pt is asking for a methylprednisone pack where he starts at a much higher dose then works his way down. Please call pt back to advise

## 2024-06-27 NOTE — TELEPHONE ENCOUNTER
I cannot see in the chart that Dr. Dietz gave him steroids and he missed his follow up appointment.  If someone else gave him steroids (like his GI doctor), he should call there; otherwise would need appointment.

## 2024-06-27 NOTE — PROGRESS NOTES
"Ambulatory Visit  Name: Nico Cruz      : 1995      MRN: [id number]  Encounter Provider: Nicole Rice MD  Encounter Date: 2024   Encounter department: [facility]    Assessment & Plan   1. Ankylosing spondylitis of multiple sites in spine (HCC)  -     predniSONE 10 mg tablet; Take 4 pills/d for 3 days then 3 pills/d for 3 days, then 2 pills/d for 3 days then 1 pill/d for 3 days then stop  D/c medrol dose pack and start prednisone taper. He is aware to let his rheumatologist know about medication change when her office is open.      History of Present Illness     HPI  Nico is here today for flare up of back pain due to ankylosing spondylitis. His rheumatologist is Dr. Carbajal manages this and prescribed him medrol dose pack for this current flare which he started 4 days ago. It helped the first day, but after tapering down, his symptoms have been worsening. He tried calling Dr. Parson's office, but they are closed from today (Thursday) until Monday so he didn't know what to do.     Review of Systems   Constitutional: Negative.    HENT: Negative.     Eyes: Negative.    Respiratory: Negative.     Cardiovascular: Negative.    Gastrointestinal: Negative.    Endocrine: Negative.    Genitourinary: Negative.    Musculoskeletal:  Positive for back pain.   Neurological: Negative.    Hematological: Negative.    Psychiatric/Behavioral: Negative.         Objective     /70   Pulse 81   Temp 97.6 °F (36.4 °C)   Resp 18   Ht 5' 9\" (1.753 m)   Wt 79.4 kg (175 lb)   SpO2 98%   BMI 25.84 kg/m²     Physical Exam  Vitals and nursing note reviewed.   Constitutional:       General: He is not in acute distress.     Appearance: Normal appearance. He is well-developed.   HENT:      Head: Normocephalic and atraumatic.   Eyes:      Conjunctiva/sclera: Conjunctivae normal.   Cardiovascular:      Rate and Rhythm: Normal rate and regular rhythm.      Pulses: Normal pulses.      Heart sounds: " Normal heart sounds. No murmur heard.  Pulmonary:      Effort: Pulmonary effort is normal. No respiratory distress.      Breath sounds: Normal breath sounds.   Musculoskeletal:         General: No swelling.      Cervical back: Neck supple.   Skin:     General: Skin is warm and dry.      Capillary Refill: Capillary refill takes less than 2 seconds.   Neurological:      Mental Status: He is alert.   Psychiatric:         Mood and Affect: Mood normal.       Administrative Statements

## 2024-08-01 ENCOUNTER — TELEPHONE (OUTPATIENT)
Age: 29
End: 2024-08-01

## 2024-08-01 NOTE — TELEPHONE ENCOUNTER
Nurse  from [facility] called to notify Dr Dietz that the patient is no longer enrolled with case management services

## 2024-09-17 ENCOUNTER — NON-APPOINTMENT (OUTPATIENT)
Age: 29
End: 2024-09-17

## 2024-09-19 ENCOUNTER — APPOINTMENT (OUTPATIENT)
Dept: COLORECTAL SURGERY | Facility: CLINIC | Age: 29
End: 2024-09-19

## 2024-10-06 ENCOUNTER — APPOINTMENT (EMERGENCY)
Dept: RADIOLOGY | Facility: HOSPITAL | Age: 29
End: 2024-10-06
Payer: COMMERCIAL

## 2024-10-06 ENCOUNTER — HOSPITAL ENCOUNTER (EMERGENCY)
Facility: HOSPITAL | Age: 29
Discharge: HOME | End: 2024-10-06
Attending: EMERGENCY MEDICINE
Payer: COMMERCIAL

## 2024-10-06 VITALS
WEIGHT: 175 LBS | OXYGEN SATURATION: 100 % | BODY MASS INDEX: 25.84 KG/M2 | TEMPERATURE: 98.9 F | SYSTOLIC BLOOD PRESSURE: 139 MMHG | RESPIRATION RATE: 22 BRPM | HEART RATE: 96 BPM | DIASTOLIC BLOOD PRESSURE: 74 MMHG

## 2024-10-06 DIAGNOSIS — R10.9 ABDOMINAL PAIN: Primary | ICD-10-CM

## 2024-10-06 DIAGNOSIS — K51.90 ULCERATIVE COLITIS (HCC): ICD-10-CM

## 2024-10-06 LAB
ALBUMIN SERPL BCG-MCNC: 4.7 G/DL (ref 3.5–5)
ALP SERPL-CCNC: 69 U/L (ref 34–104)
ALT SERPL W P-5'-P-CCNC: 20 U/L (ref 7–52)
ANION GAP SERPL CALCULATED.3IONS-SCNC: 8 MMOL/L (ref 4–13)
APTT PPP: 26 SECONDS (ref 23–34)
AST SERPL W P-5'-P-CCNC: 19 U/L (ref 13–39)
BASOPHILS # BLD AUTO: 0.11 THOUSANDS/ΜL (ref 0–0.1)
BASOPHILS NFR BLD AUTO: 1 % (ref 0–1)
BILIRUB SERPL-MCNC: 0.7 MG/DL (ref 0.2–1)
BILIRUB UR QL STRIP: NEGATIVE
BUN SERPL-MCNC: 12 MG/DL (ref 5–25)
CALCIUM SERPL-MCNC: 9.9 MG/DL (ref 8.4–10.2)
CHLORIDE SERPL-SCNC: 103 MMOL/L (ref 96–108)
CLARITY UR: CLEAR
CO2 SERPL-SCNC: 25 MMOL/L (ref 21–32)
COLOR UR: YELLOW
CREAT SERPL-MCNC: 1 MG/DL (ref 0.6–1.3)
EOSINOPHIL # BLD AUTO: 0.17 THOUSAND/ΜL (ref 0–0.61)
EOSINOPHIL NFR BLD AUTO: 2 % (ref 0–6)
ERYTHROCYTE [DISTWIDTH] IN BLOOD BY AUTOMATED COUNT: 17.8 % (ref 11.6–15.1)
GFR SERPL CREATININE-BSD FRML MDRD: 99 ML/MIN/1.73SQ M
GLUCOSE SERPL-MCNC: 92 MG/DL (ref 65–140)
GLUCOSE UR STRIP-MCNC: NEGATIVE MG/DL
HCT VFR BLD AUTO: 38.6 % (ref 36.5–49.3)
HGB BLD-MCNC: 11.4 G/DL (ref 12–17)
HGB UR QL STRIP.AUTO: NEGATIVE
IMM GRANULOCYTES # BLD AUTO: 0.02 THOUSAND/UL (ref 0–0.2)
IMM GRANULOCYTES NFR BLD AUTO: 0 % (ref 0–2)
INR PPP: 0.93 (ref 0.85–1.19)
KETONES UR STRIP-MCNC: NEGATIVE MG/DL
LACTATE SERPL-SCNC: 1.2 MMOL/L (ref 0.5–2)
LEUKOCYTE ESTERASE UR QL STRIP: NEGATIVE
LIPASE SERPL-CCNC: 27 U/L (ref 11–82)
LYMPHOCYTES # BLD AUTO: 1.29 THOUSANDS/ΜL (ref 0.6–4.47)
LYMPHOCYTES NFR BLD AUTO: 12 % (ref 14–44)
MCH RBC QN AUTO: 18.4 PG (ref 26.8–34.3)
MCHC RBC AUTO-ENTMCNC: 29.5 G/DL (ref 31.4–37.4)
MCV RBC AUTO: 63 FL (ref 82–98)
MONOCYTES # BLD AUTO: 0.95 THOUSAND/ΜL (ref 0.17–1.22)
MONOCYTES NFR BLD AUTO: 9 % (ref 4–12)
NEUTROPHILS # BLD AUTO: 7.89 THOUSANDS/ΜL (ref 1.85–7.62)
NEUTS SEG NFR BLD AUTO: 76 % (ref 43–75)
NITRITE UR QL STRIP: NEGATIVE
NRBC BLD AUTO-RTO: 0 /100 WBCS
PH UR STRIP.AUTO: 6 [PH]
PLATELET # BLD AUTO: 390 THOUSANDS/UL (ref 149–390)
PMV BLD AUTO: 8.5 FL (ref 8.9–12.7)
POTASSIUM SERPL-SCNC: 3.7 MMOL/L (ref 3.5–5.3)
PROT SERPL-MCNC: 7.8 G/DL (ref 6.4–8.4)
PROT UR STRIP-MCNC: NEGATIVE MG/DL
PROTHROMBIN TIME: 13 SECONDS (ref 12.3–15)
RBC # BLD AUTO: 6.18 MILLION/UL (ref 3.88–5.62)
SODIUM SERPL-SCNC: 136 MMOL/L (ref 135–147)
SP GR UR STRIP.AUTO: 1.02 (ref 1–1.03)
UROBILINOGEN UR STRIP-ACNC: <2 MG/DL
WBC # BLD AUTO: 10.43 THOUSAND/UL (ref 4.31–10.16)

## 2024-10-06 PROCEDURE — 81003 URINALYSIS AUTO W/O SCOPE: CPT | Performed by: EMERGENCY MEDICINE

## 2024-10-06 PROCEDURE — 96375 TX/PRO/DX INJ NEW DRUG ADDON: CPT

## 2024-10-06 PROCEDURE — 74177 CT ABD & PELVIS W/CONTRAST: CPT

## 2024-10-06 PROCEDURE — 85610 PROTHROMBIN TIME: CPT | Performed by: EMERGENCY MEDICINE

## 2024-10-06 PROCEDURE — 85730 THROMBOPLASTIN TIME PARTIAL: CPT | Performed by: EMERGENCY MEDICINE

## 2024-10-06 PROCEDURE — 83605 ASSAY OF LACTIC ACID: CPT | Performed by: EMERGENCY MEDICINE

## 2024-10-06 PROCEDURE — 36415 COLL VENOUS BLD VENIPUNCTURE: CPT | Performed by: EMERGENCY MEDICINE

## 2024-10-06 PROCEDURE — 96376 TX/PRO/DX INJ SAME DRUG ADON: CPT

## 2024-10-06 PROCEDURE — 96361 HYDRATE IV INFUSION ADD-ON: CPT

## 2024-10-06 PROCEDURE — 83690 ASSAY OF LIPASE: CPT | Performed by: EMERGENCY MEDICINE

## 2024-10-06 PROCEDURE — 99285 EMERGENCY DEPT VISIT HI MDM: CPT

## 2024-10-06 PROCEDURE — 85025 COMPLETE CBC W/AUTO DIFF WBC: CPT | Performed by: EMERGENCY MEDICINE

## 2024-10-06 PROCEDURE — 80053 COMPREHEN METABOLIC PANEL: CPT | Performed by: EMERGENCY MEDICINE

## 2024-10-06 PROCEDURE — 96374 THER/PROPH/DIAG INJ IV PUSH: CPT

## 2024-10-06 PROCEDURE — 99285 EMERGENCY DEPT VISIT HI MDM: CPT | Performed by: EMERGENCY MEDICINE

## 2024-10-06 RX ORDER — METOCLOPRAMIDE HYDROCHLORIDE 5 MG/ML
10 INJECTION INTRAMUSCULAR; INTRAVENOUS ONCE
Status: COMPLETED | OUTPATIENT
Start: 2024-10-06 | End: 2024-10-06

## 2024-10-06 RX ORDER — HYDROMORPHONE HYDROCHLORIDE 2 MG/ML
2 INJECTION, SOLUTION INTRAMUSCULAR; INTRAVENOUS; SUBCUTANEOUS ONCE
Status: COMPLETED | OUTPATIENT
Start: 2024-10-06 | End: 2024-10-06

## 2024-10-06 RX ORDER — ONDANSETRON 2 MG/ML
4 INJECTION INTRAMUSCULAR; INTRAVENOUS ONCE
Status: COMPLETED | OUTPATIENT
Start: 2024-10-06 | End: 2024-10-06

## 2024-10-06 RX ORDER — HYDROMORPHONE HCL/PF 1 MG/ML
1 SYRINGE (ML) INJECTION ONCE
Status: COMPLETED | OUTPATIENT
Start: 2024-10-06 | End: 2024-10-06

## 2024-10-06 RX ADMIN — IOHEXOL 100 ML: 350 INJECTION, SOLUTION INTRAVENOUS at 16:30

## 2024-10-06 RX ADMIN — IOHEXOL 50 ML: 240 INJECTION, SOLUTION INTRATHECAL; INTRAVASCULAR; INTRAVENOUS; ORAL at 14:25

## 2024-10-06 RX ADMIN — HYDROMORPHONE HYDROCHLORIDE 1 MG: 1 INJECTION, SOLUTION INTRAMUSCULAR; INTRAVENOUS; SUBCUTANEOUS at 17:00

## 2024-10-06 RX ADMIN — SODIUM CHLORIDE 1000 ML: 0.9 INJECTION, SOLUTION INTRAVENOUS at 12:16

## 2024-10-06 RX ADMIN — HYDROMORPHONE HYDROCHLORIDE 2 MG: 2 INJECTION, SOLUTION INTRAMUSCULAR; INTRAVENOUS; SUBCUTANEOUS at 12:10

## 2024-10-06 RX ADMIN — ONDANSETRON 4 MG: 2 INJECTION INTRAMUSCULAR; INTRAVENOUS at 12:10

## 2024-10-06 RX ADMIN — METOCLOPRAMIDE 10 MG: 5 INJECTION, SOLUTION INTRAMUSCULAR; INTRAVENOUS at 17:00

## 2024-10-06 RX ADMIN — HYDROMORPHONE HYDROCHLORIDE 2 MG: 2 INJECTION, SOLUTION INTRAMUSCULAR; INTRAVENOUS; SUBCUTANEOUS at 13:47

## 2024-10-06 NOTE — Clinical Note
Nico Cruz was seen and treated in our emergency department on 10/6/2024.                Diagnosis:     Nico  may return to work on return date.    He may return on this date: 10/09/2024         If you have any questions or concerns, please don't hesitate to call.      Les Busch MD    ______________________________           _______________          _______________  Hospital Representative                              Date                                Time

## 2024-10-06 NOTE — ED PROVIDER NOTES
Final diagnoses:   Abdominal pain   Ulcerative colitis (HCC)     ED Disposition       ED Disposition   Discharge    Condition   Stable    Date/Time   Sun Oct 6, 2024  6:24 PM    Comment   Nico Cruz discharge to home/self care.                   Assessment & Plan       Medical Decision Making  Patient has a complicated surgical history with multiple bouts of intra-abdominal inflammatory processes including infections and stricture formation and SBO.  Patient has developed significant opioid tolerance.  Will CT scan, evaluate for possible bowel obstruction    Amount and/or Complexity of Data Reviewed  Labs: ordered.  Radiology: ordered.    Risk  Prescription drug management.             Medications   HYDROmorphone (DILAUDID) injection 2 mg (2 mg Intravenous Given 10/6/24 1210)   ondansetron (ZOFRAN) injection 4 mg (4 mg Intravenous Given 10/6/24 1210)   sodium chloride 0.9 % bolus 1,000 mL (1,000 mL Intravenous New Bag 10/6/24 1216)   HYDROmorphone (DILAUDID) injection 2 mg (2 mg Intravenous Given 10/6/24 1347)   iohexol (OMNIPAQUE) 240 MG/ML solution 50 mL (50 mL Oral Given 10/6/24 1425)   iohexol (OMNIPAQUE) 350 MG/ML injection (MULTI-DOSE) 100 mL (100 mL Intravenous Given 10/6/24 1630)   HYDROmorphone (DILAUDID) injection 1 mg (1 mg Intravenous Given 10/6/24 1700)   metoclopramide (REGLAN) injection 10 mg (10 mg Intravenous Given 10/6/24 1700)       ED Risk Strat Scores                                               History of Present Illness       Chief Complaint   Patient presents with    Abdominal Pain     Pt reported started last night with diarrhea. Took meds and now unable to go. Aware he has a stricture, unsure if that's what is causing the pain.       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    Added automatically from  request for surgery [id number]      Ileal pouchitis (HCC) 09/13/2018    Pancreatitis     Partial small bowel obstruction (HCC) 07/06/2022    Seizure-like activity (HCC) 02/17/2021    Ulcerative colitis (HCC)       Past Surgical History:   Procedure Laterality Date    COLECTOMY TOTAL      with ileal pouch and anastemosis    IR DRAINAGE TUBE PLACEMENT  12/27/2023    IR PICC PLACEMENT SINGLE LUMEN  3/1/2022    TOTAL COLECTOMY        Family History   Problem Relation Age of Onset    Lung disease Neg Hx       Social History     Tobacco Use    Smoking status: Never    Smokeless tobacco: Never   Vaping Use    Vaping status: Never Used   Substance Use Topics    Alcohol use: Not Currently    Drug use: No      E-Cigarette/Vaping    E-Cigarette Use Never User       E-Cigarette/Vaping Substances    Nicotine No     THC No     CBD No     Flavoring No     Other No     Unknown No       I have reviewed and agree with the history as documented.     Patient is a complicated surgical history with known ulcerative colitis.  He has had a colectomy with ileal pouch formation and subsequent complications of perforation, stricture formation and revision.  He has had SBO multiple times.  Patient has been well recently until yesterday when he started developing abdominal distention with increasing nausea.  Patient developed sharp lower abdominal pain associated with multiple bouts of vomiting starting around 2:00 in the morning.  He has had some decrease in stool output which he refers to as constipation.  No fever.  Patient presented for work this morning however was unable to continue due to the pain and vomiting        Review of Systems   Constitutional:  Negative for chills and fever.   HENT:  Negative for congestion and sore throat.    Eyes:  Negative for visual disturbance.   Respiratory:  Negative for cough.    Cardiovascular:  Negative for chest pain.   Gastrointestinal:  Positive for abdominal distention, abdominal pain, nausea and  vomiting.   Genitourinary:  Negative for decreased urine volume, difficulty urinating and dysuria.   Musculoskeletal:  Negative for back pain.   Skin:  Negative for rash.   Neurological:  Positive for weakness. Negative for headaches.   Hematological:  Does not bruise/bleed easily.   Psychiatric/Behavioral:  The patient is nervous/anxious.    All other systems reviewed and are negative.          Objective       ED Triage Vitals   Temperature Pulse Blood Pressure Respirations SpO2 Patient Position - Orthostatic VS   10/06/24 1146 10/06/24 1144 10/06/24 1144 10/06/24 1144 10/06/24 1144 10/06/24 1144   98.9 °F (37.2 °C) (!) 121 (!) 172/86 20 98 % Sitting      Temp Source Heart Rate Source BP Location FiO2 (%) Pain Score    10/06/24 1144 10/06/24 1144 10/06/24 1144 -- 10/06/24 1144    Tympanic Monitor Right arm  10 - Worst Possible Pain      Vitals      Date and Time Temp Pulse SpO2 Resp BP Pain Score FACES Pain Rating User   10/06/24 1730 -- -- -- -- -- 3 -- CS   10/06/24 1701 -- 96 100 % 22 139/74 -- -- CS   10/06/24 1700 -- -- -- -- 139/74 -- -- CS   10/06/24 1315 -- 88 100 % -- 152/107 -- -- CS   10/06/24 1308 -- 95 99 % 22 152/107 9 -- CS   10/06/24 1238 -- -- -- -- -- 6 -- CS   10/06/24 1146 98.9 °F (37.2 °C) -- -- -- -- -- -- JCM   10/06/24 1144 -- 121 98 % 20 172/86 10 - Worst Possible Pain -- JCM            Physical Exam  Vitals and nursing note reviewed.   Constitutional:       Appearance: He is well-developed.   HENT:      Head: Normocephalic.      Mouth/Throat:      Mouth: Mucous membranes are moist.   Eyes:      Extraocular Movements: Extraocular movements intact.   Cardiovascular:      Rate and Rhythm: Regular rhythm. Tachycardia present.      Heart sounds: Normal heart sounds.   Pulmonary:      Effort: Pulmonary effort is normal.      Breath sounds: Normal breath sounds.   Abdominal:      General: Bowel sounds are absent. There is distension.      Tenderness: There is generalized abdominal tenderness.    Skin:     General: Skin is warm and dry.      Capillary Refill: Capillary refill takes less than 2 seconds.   Neurological:      General: No focal deficit present.      Mental Status: He is alert and oriented to person, place, and time.   Psychiatric:         Mood and Affect: Mood normal.         Behavior: Behavior normal.         Results Reviewed       Procedure Component Value Units Date/Time    UA (URINE) with reflex to Scope [[id number]] Collected: 10/06/24 1242    Lab Status: Final result Specimen: Urine, Clean Catch Updated: 10/06/24 1249     Color, UA Yellow     Clarity, UA Clear     Specific Gravity, UA 1.020     pH, UA 6.0     Leukocytes, UA Negative     Nitrite, UA Negative     Protein, UA Negative mg/dl      Glucose, UA Negative mg/dl      Ketones, UA Negative mg/dl      Urobilinogen, UA <2.0 mg/dl      Bilirubin, UA Negative     Occult Blood, UA Negative    Comprehensive metabolic panel [[id number]] Collected: 10/06/24 1214    Lab Status: Final result Specimen: Blood from Arm, Left Updated: 10/06/24 1237     Sodium 136 mmol/L      Potassium 3.7 mmol/L      Chloride 103 mmol/L      CO2 25 mmol/L      ANION GAP 8 mmol/L      BUN 12 mg/dL      Creatinine 1.00 mg/dL      Glucose 92 mg/dL      Calcium 9.9 mg/dL      AST 19 U/L      ALT 20 U/L      Alkaline Phosphatase 69 U/L      Total Protein 7.8 g/dL      Albumin 4.7 g/dL      Total Bilirubin 0.70 mg/dL      eGFR 99 ml/min/1.73sq m     Narrative:      National Kid[employer] Disease Foundation guidelines for Chronic Kidney Disease (CKD):     Stage 1 with normal or high GFR (GFR > 90 mL/min/1.73 square meters)    Stage 2 Mild CKD (GFR = 60-89 mL/min/1.73 square meters)    Stage 3A Moderate CKD (GFR = 45-59 mL/min/1.73 square meters)    Stage 3B Moderate CKD (GFR = 30-44 mL/min/1.73 square meters)    Stage 4 Severe CKD (GFR = 15-29 mL/min/1.73 square meters)    Stage 5 End Stage CKD (GFR <15 mL/min/1.73 square meters)  Note: GFR calculation is accurate only with a  steady state creatinine    Lipase [[id number]]  (Normal) Collected: 10/06/24 1214    Lab Status: Final result Specimen: Blood from Arm, Left Updated: 10/06/24 1237     Lipase 27 u/L     Lactic acid, plasma (w/reflex if result > 2.0) [[id number]]  (Normal) Collected: 10/06/24 1214    Lab Status: Final result Specimen: Blood from Arm, Left Updated: 10/06/24 1236     LACTIC ACID 1.2 mmol/L     Narrative:      Result may be elevated if tourniquet was used during collection.    Protime-INR [[id number]]  (Normal) Collected: 10/06/24 1214    Lab Status: Final result Specimen: Blood from Arm, Left Updated: 10/06/24 1232     Protime 13.0 seconds      INR 0.93    Narrative:      INR Therapeutic Range    Indication                                             INR Range      Atrial Fibrillation                                               2.0-3.0  Hypercoagulable State                                    2.0.2.3  Left Ventricular Asist Device                            2.0-3.0  Mechanical Heart Valve                                  -    Aortic(with afib, MI, embolism, HF, LA enlargement,    and/or coagulopathy)                                     2.0-3.0 (2.5-3.5)     Mitral                                                             2.5-3.5  Prosthetic/Bioprosthetic Heart Valve               2.0-3.0  Venous thromboembolism (VTE: VT, PE        2.0-3.0    APTT [[id number]]  (Normal) Collected: 10/06/24 1214    Lab Status: Final result Specimen: Blood from Arm, Left Updated: 10/06/24 1232     PTT 26 seconds     CBC and differential [[id number]]  (Abnormal) Collected: 10/06/24 1214    Lab Status: Final result Specimen: Blood from Arm, Left Updated: 10/06/24 1221     WBC 10.43 Thousand/uL      RBC 6.18 Million/uL      Hemoglobin 11.4 g/dL      Hematocrit 38.6 %      MCV 63 fL      MCH 18.4 pg      MCHC 29.5 g/dL      RDW 17.8 %      MPV 8.5 fL      Platelets 390 Thousands/uL      nRBC 0 /100 WBCs      Segmented % 76 %      Immature  Grans % 0 %      Lymphocytes % 12 %      Monocytes % 9 %      Eosinophils Relative 2 %      Basophils Relative 1 %      Absolute Neutrophils 7.89 Thousands/µL      Absolute Immature Grans 0.02 Thousand/uL      Absolute Lymphocytes 1.29 Thousands/µL      Absolute Monocytes 0.95 Thousand/µL      Eosinophils Absolute 0.17 Thousand/µL      Basophils Absolute 0.11 Thousands/µL             CT abdomen pelvis with contrast   Final Interpretation by Mihir Solis MD (10/06 1716)      Status post proctocolectomy with ileoanal anastomosis. Large amount of stool in the J-pouch and in dilated distal small bowel, without focal narrowing/transition point. Findings likely due to bowel dysmotility.         Workstation performed: [id number]             Procedures    ED Medication and Procedure Management   Prior to Admission Medications   Prescriptions Last Dose Informant Patient Reported? Taking?   Tofacitinib Citrate ER (Xeljanz XR) 11 MG TB24   Yes No   Sig: Take 11 mg by mouth in the morning   diphenoxylate-atropine (LOMOTIL) 2.5-0.025 mg per tablet   No No   Sig: Take 1 tablet by mouth 4 (four) times a day as needed for diarrhea for up to 10 doses   polyethylene glycol (MIRALAX) 17 g packet   No No   Sig: Take 17 g by mouth daily Can take twice daily as needed   Patient not taking: Reported on 6/27/2024   prochlorperazine (COMPAZINE) 5 mg tablet   No No   Sig: Take 1 tablet (5 mg total) by mouth every 6 (six) hours as needed for nausea or vomiting      Facility-Administered Medications: None     Patient's Medications   Discharge Prescriptions    No medications on file     No discharge procedures on file.  ED SEPSIS DOCUMENTATION   Time reflects when diagnosis was documented in both MDM as applicable and the Disposition within this note       Time User Action Codes Description Comment    10/6/2024  6:24 PM Les Busch Add [R10.9] Abdominal pain     10/6/2024  6:25 PM Les Busch Add [K51.90] Ulcerative colitis (HCC)                   Les Busch MD  10/06/24 1820

## 2024-10-10 ENCOUNTER — APPOINTMENT (OUTPATIENT)
Dept: COLORECTAL SURGERY | Facility: CLINIC | Age: 29
End: 2024-10-10
Payer: SELF-PAY

## 2024-10-10 DIAGNOSIS — K91.858 OTHER COMPLICATIONS OF INTESTINAL POUCH: ICD-10-CM

## 2024-10-10 DIAGNOSIS — Z87.19 PERSONAL HISTORY OF OTHER DISEASES OF THE DIGESTIVE SYSTEM: ICD-10-CM

## 2024-10-10 PROCEDURE — 99024 POSTOP FOLLOW-UP VISIT: CPT

## 2024-10-10 NOTE — H&P PST ADULT - HISTORY OF PRESENT ILLNESS
The patient is a 30 y/o M with hx of j-pouch redo with IPAA stricture with CC of recent straining and abdominal pain.   He presented to local ED for abdominal pain earlier this week.   He denies fever, cough, chest pain, SOB.

## 2024-10-10 NOTE — H&P PST ADULT - NSICDXPROCEDURE_GEN_ALL_CORE_FT
PROCEDURES:  Exam under anesthesia, anus 10-Oct-2024 17:07:30  Ciera Wilder  Ileoanal pouchoscopy 10-Oct-2024 17:09:19  Ciera Wilder

## 2024-10-10 NOTE — H&P PST ADULT - NSICDXPASTMEDICALHX_GEN_ALL_CORE_FT
PAST MEDICAL HISTORY:  H/O ankylosing spondylitis     H/O ulcerative colitis     History of fecal impaction     History of pancreatitis     History of sacroiliac joint dysfunction     SBO (small bowel obstruction)

## 2024-10-10 NOTE — H&P PST ADULT - ASSESSMENT
30 yo male with hx of IBD and jpouch redo with IPAA stricture and recent straining and abdominal pain.  EUA/pouchoscopy  Enema prep  Risks, Benefits, Plan and Alternatives discussed and they consent  Zainab Case PA-C

## 2024-10-10 NOTE — H&P PST ADULT - ATTENDING COMMENTS
Patient seen and examined. No change from H&P. All risks and benefits explained to the patient and the patient consents to the procedure.    Fermín Woodard

## 2024-10-11 ENCOUNTER — OUTPATIENT (OUTPATIENT)
Dept: INPATIENT UNIT | Facility: HOSPITAL | Age: 29
LOS: 1 days | End: 2024-10-11
Payer: COMMERCIAL

## 2024-10-11 ENCOUNTER — TRANSCRIPTION ENCOUNTER (OUTPATIENT)
Age: 29
End: 2024-10-11

## 2024-10-11 ENCOUNTER — APPOINTMENT (OUTPATIENT)
Dept: COLORECTAL SURGERY | Facility: HOSPITAL | Age: 29
End: 2024-10-11

## 2024-10-11 VITALS
RESPIRATION RATE: 16 BRPM | OXYGEN SATURATION: 99 % | TEMPERATURE: 97 F | DIASTOLIC BLOOD PRESSURE: 60 MMHG | HEART RATE: 60 BPM | SYSTOLIC BLOOD PRESSURE: 107 MMHG

## 2024-10-11 VITALS
RESPIRATION RATE: 18 BRPM | HEIGHT: 69 IN | WEIGHT: 175.05 LBS | HEART RATE: 72 BPM | OXYGEN SATURATION: 98 % | DIASTOLIC BLOOD PRESSURE: 73 MMHG | TEMPERATURE: 98 F | SYSTOLIC BLOOD PRESSURE: 118 MMHG

## 2024-10-11 DIAGNOSIS — K91.858 OTHER COMPLICATIONS OF INTESTINAL POUCH: ICD-10-CM

## 2024-10-11 DIAGNOSIS — Z98.890 OTHER SPECIFIED POSTPROCEDURAL STATES: Chronic | ICD-10-CM

## 2024-10-11 DIAGNOSIS — Z90.49 ACQUIRED ABSENCE OF OTHER SPECIFIED PARTS OF DIGESTIVE TRACT: Chronic | ICD-10-CM

## 2024-10-11 PROCEDURE — 44385 ENDOSCOPY OF BOWEL POUCH: CPT

## 2024-10-11 PROCEDURE — 45905 DILATION OF ANAL SPHINCTER: CPT

## 2024-10-11 RX ORDER — SALINE LAXATIVE 7; 19 G/118ML; G/118ML
1 ENEMA RECTAL ONCE
Refills: 0 | Status: COMPLETED | OUTPATIENT
Start: 2024-10-11 | End: 2024-10-11

## 2024-10-11 RX ORDER — HYDROMORPHONE HYDROCHLORIDE 1 MG/ML
0.5 INJECTION, SOLUTION INTRAMUSCULAR; INTRAVENOUS; SUBCUTANEOUS
Refills: 0 | Status: DISCONTINUED | OUTPATIENT
Start: 2024-10-11 | End: 2024-10-11

## 2024-10-11 RX ORDER — FENTANYL CITRATE-0.9 % NACL/PF 300MCG/30
25 PATIENT CONTROLLED ANALGESIA VIAL INJECTION
Refills: 0 | Status: DISCONTINUED | OUTPATIENT
Start: 2024-10-11 | End: 2024-10-11

## 2024-10-11 RX ORDER — OXYCODONE HYDROCHLORIDE 30 MG/1
5 TABLET, FILM COATED, EXTENDED RELEASE ORAL ONCE
Refills: 0 | Status: DISCONTINUED | OUTPATIENT
Start: 2024-10-11 | End: 2024-10-11

## 2024-10-11 RX ORDER — SODIUM CHLORIDE 0.9 % (FLUSH) 0.9 %
3 SYRINGE (ML) INJECTION EVERY 8 HOURS
Refills: 0 | Status: DISCONTINUED | OUTPATIENT
Start: 2024-10-11 | End: 2024-10-11

## 2024-10-11 RX ORDER — ONDANSETRON HCL/PF 4 MG/2 ML
4 VIAL (ML) INJECTION ONCE
Refills: 0 | Status: DISCONTINUED | OUTPATIENT
Start: 2024-10-11 | End: 2024-10-11

## 2024-10-11 RX ADMIN — SALINE LAXATIVE 1 ENEMA: 7; 19 ENEMA RECTAL at 08:15

## 2024-10-11 NOTE — BRIEF OPERATIVE NOTE - OPERATION/FINDINGS
Exam under anesthesia, flexible sigmoidoscopy, anastomotic stricture dilation. No inflammation noted in pouch. No inflammation noted in small bowel.

## 2024-10-11 NOTE — PRE-ANESTHESIA EVALUATION ADULT - NSATTENDATTESTRD_GEN_ALL_CORE
The patient has been re-examined and I agree with the above assessment or I updated with my findings. Principal Discharge DX:	Head injury

## 2024-10-11 NOTE — ASU DISCHARGE PLAN (ADULT/PEDIATRIC) - NS MD DC FALL RISK RISK
For information on Fall & Injury Prevention, visit: [email or web address] OR  [email or web address] OR  https://www.cdc.gov/steadi/patient.html

## 2024-10-11 NOTE — ASU DISCHARGE PLAN (ADULT/PEDIATRIC) - CARE PROVIDER_API CALL
Fermín Woodard  Colon/Rectal Surgery  [address] [place] [id number]  Phone: [phone]  Fax: [phone]  Follow Up Time: 2 weeks

## 2024-10-11 NOTE — ASU PATIENT PROFILE, ADULT - NS PRO MODE OF ARRIVAL
Your current Orthopedic problem we are working together to treat is:  Right ankle swelling    Recommend:  1.  Tubigrip  2.  MRI right ankle: to schedule [phone]  3. We will call you with results    During the hours of 8:00 am to 5:00 pm Monday through Friday, please contact us at one of the following offices:  [facility] office [phone], or [facility] office at [phone].    If it is urgent that you speak with someone outside of these hours, our [facility] Call Center will be able to assist you at [phone].    For more information on foot and ankle conditions and treatments, please visit the American [employer] and [employer] Society's patient education website (Foot Care MD) at:  [email or web address]    Thank you for choosing [facility] as your Orthopedic provider!       ambulatory yes

## 2024-10-30 ENCOUNTER — OFFICE VISIT (OUTPATIENT)
Dept: FAMILY MEDICINE CLINIC | Facility: CLINIC | Age: 29
End: 2024-10-30
Payer: COMMERCIAL

## 2024-10-30 VITALS
HEIGHT: 69 IN | TEMPERATURE: 97.6 F | RESPIRATION RATE: 18 BRPM | HEART RATE: 90 BPM | SYSTOLIC BLOOD PRESSURE: 124 MMHG | DIASTOLIC BLOOD PRESSURE: 80 MMHG | BODY MASS INDEX: 25.92 KG/M2 | OXYGEN SATURATION: 96 % | WEIGHT: 175 LBS

## 2024-10-30 DIAGNOSIS — F41.1 GAD (GENERALIZED ANXIETY DISORDER): Primary | ICD-10-CM

## 2024-10-30 DIAGNOSIS — Z87.19 HISTORY OF ULCERATIVE COLITIS: ICD-10-CM

## 2024-10-30 DIAGNOSIS — M45.0 ANKYLOSING SPONDYLITIS OF MULTIPLE SITES IN SPINE (HCC): ICD-10-CM

## 2024-10-30 PROBLEM — R10.9 PAIN IN THE ABDOMEN: Status: RESOLVED | Noted: 2018-09-13 | Resolved: 2024-10-30

## 2024-10-30 PROBLEM — E83.42 HYPOMAGNESEMIA: Status: RESOLVED | Noted: 2023-12-20 | Resolved: 2024-10-30

## 2024-10-30 PROBLEM — N50.812 LEFT TESTICULAR PAIN: Status: RESOLVED | Noted: 2023-05-18 | Resolved: 2024-10-30

## 2024-10-30 PROBLEM — R79.82 ELEVATED C-REACTIVE PROTEIN (CRP): Status: RESOLVED | Noted: 2022-08-21 | Resolved: 2024-10-30

## 2024-10-30 PROBLEM — K56.41 FECAL IMPACTION (HCC): Status: RESOLVED | Noted: 2024-05-25 | Resolved: 2024-10-30

## 2024-10-30 PROBLEM — E87.8 ELECTROLYTE ABNORMALITY: Status: RESOLVED | Noted: 2023-01-18 | Resolved: 2024-10-30

## 2024-10-30 PROCEDURE — 99214 OFFICE O/P EST MOD 30 MIN: CPT | Performed by: FAMILY MEDICINE

## 2024-10-30 RX ORDER — DULOXETIN HYDROCHLORIDE 60 MG/1
60 CAPSULE, DELAYED RELEASE ORAL DAILY
Qty: 90 CAPSULE | Refills: 1 | Status: SHIPPED | OUTPATIENT
Start: 2024-10-30

## 2024-10-30 NOTE — PATIENT INSTRUCTIONS
We can retry the duloxetine 60mg once a day and if in 4 weeks you tolerate it but may need a higher dose, you can contact me to discuss using 120mg.  Alternately, sertraline or escitalopram may be options that you tolerated in the past.

## 2024-10-30 NOTE — PROGRESS NOTES
Assessment/Plan:    No problem-specific Assessment & Plan notes found for this encounter.    CAR, less situational anxiety type  Been on zoloft, lexapro, duloxetine in the past  Start with duloxetine 60mg/d  Could report response in 4w, could go up to 120mg/d  Could consider zoloft or lexapro in future  May help with fatigue, motivation, energy, AS pain    UC stable at present    AS unchanged     Diagnoses and all orders for this visit:    CAR (generalized anxiety disorder)  -     DULoxetine (CYMBALTA) 60 mg delayed release capsule; Take 1 capsule (60 mg total) by mouth daily    History of ulcerative colitis    Ankylosing spondylitis of multiple sites in spine (HCC)        Return in about 6 months (around 4/30/2025) for Recheck.    Subjective:      Patient ID: Nico Cruz is a 29 y.o. male.    Chief Complaint   Patient presents with    Follow-up     Medication review  [id number]       HPI  Bad anxiety  Lately has gotten much worse  Anxiety over medical health issues in past  Been to counselor for past 6m but benefit seems stalled  Not work stress really, likes his job  Lately more CAR symptoms, not depression  Chest burning  Low motivation  Wakes up anxious  Able to work w/o problem  Nervous on social settings  Unlike himself  Social phobia type sx, less social  Trouble concentrating with some tasks  Some forgetfulness, not affecting work  Really no reason for anxiety unlike before    The following portions of the patient's history were reviewed and updated as appropriate: allergies, current medications, past family history, past medical history, past social history, past surgical history and problem list.    Review of Systems   Psychiatric/Behavioral:  Negative for hallucinations and suicidal ideas. The patient is nervous/anxious.          Current Outpatient Medications   Medication Sig Dispense Refill    diphenoxylate-atropine (LOMOTIL) 2.5-0.025 mg per tablet Take 1 tablet by mouth 4 (four) times a day as needed  "for diarrhea for up to 10 doses 10 tablet 0    DULoxetine (CYMBALTA) 60 mg delayed release capsule Take 1 capsule (60 mg total) by mouth daily 90 capsule 1    prochlorperazine (COMPAZINE) 5 mg tablet Take 1 tablet (5 mg total) by mouth every 6 (six) hours as needed for nausea or vomiting 90 tablet 1    Tofacitinib Citrate ER (Xeljanz XR) 11 MG TB24 Take 11 mg by mouth in the morning      polyethylene glycol (MIRALAX) 17 g packet Take 17 g by mouth daily Can take twice daily as needed (Patient not taking: Reported on 6/27/2024) 30 each 0     No current facility-administered medications for this visit.       Objective:    /80   Pulse 90   Temp 97.6 °F (36.4 °C)   Resp 18   Ht 5' 9\" (1.753 m)   Wt 79.4 kg (175 lb)   SpO2 96%   BMI 25.84 kg/m²        Physical Exam  Vitals and nursing note reviewed.   Constitutional:       General: He is not in acute distress.     Appearance: He is well-developed. He is not ill-appearing.   HENT:      Head: Normocephalic.      Right Ear: Tympanic membrane normal.      Left Ear: Tympanic membrane normal.      Mouth/Throat:      Mouth: Mucous membranes are moist.   Eyes:      General: No scleral icterus.     Conjunctiva/sclera: Conjunctivae normal.   Cardiovascular:      Rate and Rhythm: Normal rate and regular rhythm.   Pulmonary:      Effort: Pulmonary effort is normal. No respiratory distress.      Breath sounds: No wheezing.   Abdominal:      Palpations: Abdomen is soft.   Musculoskeletal:         General: No deformity.      Cervical back: Neck supple.   Skin:     General: Skin is warm and dry.      Coloration: Skin is not jaundiced or pale.   Neurological:      Mental Status: He is alert.      Motor: No weakness.      Gait: Gait normal.   Psychiatric:         Mood and Affect: Mood is anxious.         Behavior: Behavior normal.         Thought Content: Thought content normal.           Smooth Dietz, DO    "

## 2024-11-25 ENCOUNTER — HOSPITAL ENCOUNTER (EMERGENCY)
Facility: HOSPITAL | Age: 29
Discharge: HOME | End: 2024-11-26
Attending: EMERGENCY MEDICINE
Payer: COMMERCIAL

## 2024-11-25 VITALS
SYSTOLIC BLOOD PRESSURE: 123 MMHG | OXYGEN SATURATION: 100 % | DIASTOLIC BLOOD PRESSURE: 75 MMHG | HEART RATE: 98 BPM | TEMPERATURE: 97.6 F | RESPIRATION RATE: 18 BRPM

## 2024-11-25 DIAGNOSIS — E87.6 HYPOKALEMIA: ICD-10-CM

## 2024-11-25 DIAGNOSIS — Z78.9 HEAVY EXERCISE: ICD-10-CM

## 2024-11-25 DIAGNOSIS — R74.8 HIGH SERUM CREATINE KINASE (CK): ICD-10-CM

## 2024-11-25 DIAGNOSIS — R55 VASOVAGAL SYNCOPE: Primary | ICD-10-CM

## 2024-11-25 LAB
BASOPHILS # BLD AUTO: 0.09 THOUSANDS/ΜL (ref 0–0.1)
BASOPHILS NFR BLD AUTO: 1 % (ref 0–1)
BILIRUB UR QL STRIP: NEGATIVE
CLARITY UR: CLEAR
COLOR UR: YELLOW
EOSINOPHIL # BLD AUTO: 0.17 THOUSAND/ΜL (ref 0–0.61)
EOSINOPHIL NFR BLD AUTO: 1 % (ref 0–6)
ERYTHROCYTE [DISTWIDTH] IN BLOOD BY AUTOMATED COUNT: 17.4 % (ref 11.6–15.1)
GLUCOSE UR STRIP-MCNC: NEGATIVE MG/DL
HCT VFR BLD AUTO: 38.1 % (ref 36.5–49.3)
HGB BLD-MCNC: 11.6 G/DL (ref 12–17)
HGB UR QL STRIP.AUTO: NEGATIVE
IMM GRANULOCYTES # BLD AUTO: 0.03 THOUSAND/UL (ref 0–0.2)
IMM GRANULOCYTES NFR BLD AUTO: 0 % (ref 0–2)
KETONES UR STRIP-MCNC: ABNORMAL MG/DL
LEUKOCYTE ESTERASE UR QL STRIP: NEGATIVE
LYMPHOCYTES # BLD AUTO: 2.37 THOUSANDS/ΜL (ref 0.6–4.47)
LYMPHOCYTES NFR BLD AUTO: 18 % (ref 14–44)
MCH RBC QN AUTO: 18.9 PG (ref 26.8–34.3)
MCHC RBC AUTO-ENTMCNC: 30.4 G/DL (ref 31.4–37.4)
MCV RBC AUTO: 62 FL (ref 82–98)
MONOCYTES # BLD AUTO: 1.03 THOUSAND/ΜL (ref 0.17–1.22)
MONOCYTES NFR BLD AUTO: 8 % (ref 4–12)
NEUTROPHILS # BLD AUTO: 9.8 THOUSANDS/ΜL (ref 1.85–7.62)
NEUTS SEG NFR BLD AUTO: 72 % (ref 43–75)
NITRITE UR QL STRIP: NEGATIVE
NRBC BLD AUTO-RTO: 0 /100 WBCS
PH UR STRIP.AUTO: 6 [PH]
PLATELET # BLD AUTO: 345 THOUSANDS/UL (ref 149–390)
PMV BLD AUTO: 9 FL (ref 8.9–12.7)
PROT UR STRIP-MCNC: ABNORMAL MG/DL
RBC # BLD AUTO: 6.13 MILLION/UL (ref 3.88–5.62)
SP GR UR STRIP.AUTO: >=1.03 (ref 1–1.03)
UROBILINOGEN UR STRIP-ACNC: <2 MG/DL
WBC # BLD AUTO: 13.49 THOUSAND/UL (ref 4.31–10.16)

## 2024-11-25 PROCEDURE — 80053 COMPREHEN METABOLIC PANEL: CPT | Performed by: EMERGENCY MEDICINE

## 2024-11-25 PROCEDURE — 99284 EMERGENCY DEPT VISIT MOD MDM: CPT

## 2024-11-25 PROCEDURE — 81001 URINALYSIS AUTO W/SCOPE: CPT | Performed by: EMERGENCY MEDICINE

## 2024-11-25 PROCEDURE — 99284 EMERGENCY DEPT VISIT MOD MDM: CPT | Performed by: EMERGENCY MEDICINE

## 2024-11-25 PROCEDURE — 85025 COMPLETE CBC W/AUTO DIFF WBC: CPT | Performed by: EMERGENCY MEDICINE

## 2024-11-25 PROCEDURE — 87086 URINE CULTURE/COLONY COUNT: CPT | Performed by: EMERGENCY MEDICINE

## 2024-11-25 PROCEDURE — 82550 ASSAY OF CK (CPK): CPT | Performed by: EMERGENCY MEDICINE

## 2024-11-25 PROCEDURE — 36415 COLL VENOUS BLD VENIPUNCTURE: CPT | Performed by: EMERGENCY MEDICINE

## 2024-11-25 PROCEDURE — 93005 ELECTROCARDIOGRAM TRACING: CPT

## 2024-11-25 RX ORDER — FAMOTIDINE 10 MG/ML
20 INJECTION, SOLUTION INTRAVENOUS ONCE
Status: DISCONTINUED | OUTPATIENT
Start: 2024-11-25 | End: 2024-11-26

## 2024-11-25 RX ORDER — METOCLOPRAMIDE HYDROCHLORIDE 5 MG/ML
10 INJECTION INTRAMUSCULAR; INTRAVENOUS ONCE
Status: DISCONTINUED | OUTPATIENT
Start: 2024-11-25 | End: 2024-11-26

## 2024-11-25 RX ORDER — DIPHENHYDRAMINE HYDROCHLORIDE 50 MG/ML
25 INJECTION INTRAMUSCULAR; INTRAVENOUS ONCE
Status: DISCONTINUED | OUTPATIENT
Start: 2024-11-25 | End: 2024-11-26

## 2024-11-25 RX ORDER — KETOROLAC TROMETHAMINE 30 MG/ML
15 INJECTION, SOLUTION INTRAMUSCULAR; INTRAVENOUS ONCE
Status: DISCONTINUED | OUTPATIENT
Start: 2024-11-25 | End: 2024-11-26

## 2024-11-25 RX ORDER — ACETAMINOPHEN 10 MG/ML
1000 INJECTION, SOLUTION INTRAVENOUS ONCE
Status: DISCONTINUED | OUTPATIENT
Start: 2024-11-25 | End: 2024-11-26

## 2024-11-25 NOTE — Clinical Note
Nicoyanelis Cruz was seen and treated in our emergency department on 11/25/2024.                Diagnosis:     Nico  .    He may return on this date: 11/27/2024         If you have any questions or concerns, please don't hesitate to call.      Shabbir Shaver MD    ______________________________           _______________          _______________  Hospital Representative                              Date                                Time

## 2024-11-26 ENCOUNTER — OFFICE VISIT (OUTPATIENT)
Dept: FAMILY MEDICINE CLINIC | Facility: CLINIC | Age: 29
End: 2024-11-26
Payer: COMMERCIAL

## 2024-11-26 VITALS
BODY MASS INDEX: 26.63 KG/M2 | HEIGHT: 69 IN | RESPIRATION RATE: 18 BRPM | HEART RATE: 90 BPM | SYSTOLIC BLOOD PRESSURE: 112 MMHG | WEIGHT: 179.8 LBS | TEMPERATURE: 100.4 F | DIASTOLIC BLOOD PRESSURE: 80 MMHG

## 2024-11-26 DIAGNOSIS — R55 VASOVAGAL SYNCOPE: Primary | ICD-10-CM

## 2024-11-26 DIAGNOSIS — Z87.19 HISTORY OF ULCERATIVE COLITIS: ICD-10-CM

## 2024-11-26 DIAGNOSIS — M45.0 ANKYLOSING SPONDYLITIS OF MULTIPLE SITES IN SPINE (HCC): ICD-10-CM

## 2024-11-26 DIAGNOSIS — R50.9 FEVER, UNSPECIFIED FEVER CAUSE: ICD-10-CM

## 2024-11-26 LAB
ALBUMIN SERPL BCG-MCNC: 5 G/DL (ref 3.5–5)
ALP SERPL-CCNC: 53 U/L (ref 34–104)
ALT SERPL W P-5'-P-CCNC: 29 U/L (ref 7–52)
ANION GAP SERPL CALCULATED.3IONS-SCNC: 9 MMOL/L (ref 4–13)
AST SERPL W P-5'-P-CCNC: 49 U/L (ref 13–39)
BACTERIA UR QL AUTO: ABNORMAL /HPF
BILIRUB SERPL-MCNC: 0.91 MG/DL (ref 0.2–1)
BUN SERPL-MCNC: 15 MG/DL (ref 5–25)
CALCIUM SERPL-MCNC: 10 MG/DL (ref 8.4–10.2)
CHLORIDE SERPL-SCNC: 99 MMOL/L (ref 96–108)
CK SERPL-CCNC: 657 U/L (ref 39–308)
CO2 SERPL-SCNC: 27 MMOL/L (ref 21–32)
CREAT SERPL-MCNC: 1.21 MG/DL (ref 0.6–1.3)
GFR SERPL CREATININE-BSD FRML MDRD: 79 ML/MIN/1.73SQ M
GLUCOSE SERPL-MCNC: 115 MG/DL (ref 65–140)
HYALINE CASTS #/AREA URNS LPF: ABNORMAL /LPF
MUCOUS THREADS UR QL AUTO: ABNORMAL
NON-SQ EPI CELLS URNS QL MICRO: ABNORMAL /HPF
POTASSIUM SERPL-SCNC: 3 MMOL/L (ref 3.5–5.3)
PROT SERPL-MCNC: 7.5 G/DL (ref 6.4–8.4)
RBC #/AREA URNS AUTO: ABNORMAL /HPF
SODIUM SERPL-SCNC: 135 MMOL/L (ref 135–147)
WBC #/AREA URNS AUTO: ABNORMAL /HPF

## 2024-11-26 PROCEDURE — 99214 OFFICE O/P EST MOD 30 MIN: CPT | Performed by: NURSE PRACTITIONER

## 2024-11-26 NOTE — ED PROVIDER NOTES
Final Diagnosis:  1. Vasovagal syncope    2. Hypokalemia    3. High serum creatine kinase (CK)    4. Heavy exercise        Chief Complaint   Patient presents with    Abdominal Pain     Pt reports severe lower abdominal pain onset about 10 minutes PTA. Had syncopal episode at that time. Pain 7/10       HPI  Pt pres w/ lower abd / penile? Pain. No testicular pain.   Tried to urinate/stool and syncopized.     No sores, no penile d/c, no dysuria.     Lower abd pain resolved by my eval. No meds here req.   No n/v  No fever/chills    Complicated med hx  UC  Colectomy  J pouch issues  Reanastom  Then needing dilation    Sig other heard a thud  Went up and pt was on knees laying on toilet  No seizure activity  Brisk return to baseline    No assoc chestpain palpitation.   Felt lightheaded, felt coming on.       EMS additionally reports:     - Previous charting underwent limited review with attention to last ED visits, labs, ekgs, and prior imaging.  Chart review reveals :     Admission on 10/06/2024, Discharged on 10/06/2024   Component Date Value Ref Range Status    WBC 10/06/2024 10.43 (H)  4.31 - 10.16 Thousand/uL Final    RBC 10/06/2024 6.18 (H)  3.88 - 5.62 Million/uL Final    Hemoglobin 10/06/2024 11.4 (L)  12.0 - 17.0 g/dL Final    Hematocrit 10/06/2024 38.6  36.5 - 49.3 % Final    MCV 10/06/2024 63 (L)  82 - 98 fL Final    MCH 10/06/2024 18.4 (L)  26.8 - 34.3 pg Final    MCHC 10/06/2024 29.5 (L)  31.4 - 37.4 g/dL Final    RDW 10/06/2024 17.8 (H)  11.6 - 15.1 % Final    MPV 10/06/2024 8.5 (L)  8.9 - 12.7 fL Final    Platelets 10/06/2024 390  149 - 390 Thousands/uL Final    nRBC 10/06/2024 0  /100 WBCs Final    Segmented % 10/06/2024 76 (H)  43 - 75 % Final    Immature Grans % 10/06/2024 0  0 - 2 % Final    Lymphocytes % 10/06/2024 12 (L)  14 - 44 % Final    Monocytes % 10/06/2024 9  4 - 12 % Final    Eosinophils Relative 10/06/2024 2  0 - 6 % Final    Basophils Relative 10/06/2024 1  0 - 1 % Final    Absolute  Neutrophils 10/06/2024 7.89 (H)  1.85 - 7.62 Thousands/µL Final    Absolute Immature Grans 10/06/2024 0.02  0.00 - 0.20 Thousand/uL Final    Absolute Lymphocytes 10/06/2024 1.29  0.60 - 4.47 Thousands/µL Final    Absolute Monocytes 10/06/2024 0.95  0.17 - 1.22 Thousand/µL Final    Eosinophils Absolute 10/06/2024 0.17  0.00 - 0.61 Thousand/µL Final    Basophils Absolute 10/06/2024 0.11 (H)  0.00 - 0.10 Thousands/µL Final    Protime 10/06/2024 13.0  12.3 - 15.0 seconds Final    INR 10/06/2024 0.93  0.85 - 1.19 Final    PTT 10/06/2024 26  23 - 34 seconds Final    Therapeutic Heparin Range =  60-90 seconds    Sodium 10/06/2024 136  135 - 147 mmol/L Final    Potassium 10/06/2024 3.7  3.5 - 5.3 mmol/L Final    Chloride 10/06/2024 103  96 - 108 mmol/L Final    CO2 10/06/2024 25  21 - 32 mmol/L Final    ANION GAP 10/06/2024 8  4 - 13 mmol/L Final    BUN 10/06/2024 12  5 - 25 mg/dL Final    Creatinine 10/06/2024 1.00  0.60 - 1.30 mg/dL Final    Standardized to IDMS reference method    Glucose 10/06/2024 92  65 - 140 mg/dL Final    If the patient is fasting, the ADA then defines impaired fasting glucose as > 100 mg/dL and diabetes as > or equal to 123 mg/dL.    Calcium 10/06/2024 9.9  8.4 - 10.2 mg/dL Final    AST 10/06/2024 19  13 - 39 U/L Final    ALT 10/06/2024 20  7 - 52 U/L Final    Specimen collection should occur prior to Sulfasalazine administration due to the potential for falsely depressed results.     Alkaline Phosphatase 10/06/2024 69  34 - 104 U/L Final    Total Protein 10/06/2024 7.8  6.4 - 8.4 g/dL Final    Albumin 10/06/2024 4.7  3.5 - 5.0 g/dL Final    Total Bilirubin 10/06/2024 0.70  0.20 - 1.00 mg/dL Final    Use of this assay is not recommended for patients undergoing treatment with eltrombopag due to the potential for falsely elevated results.  N-acetyl-p-benzoquinone imine (metabolite of Acetaminophen) will generate erroneously low results in samples for patients that have taken an overdose of  Acetaminophen.    eGFR 10/06/2024 99  ml/min/1.73sq m Final    Lipase 10/06/2024 27  11 - 82 u/L Final    LACTIC ACID 10/06/2024 1.2  0.5 - 2.0 mmol/L Final    Color, UA 10/06/2024 Yellow   Final    Clarity, UA 10/06/2024 Clear   Final    Specific Gravity, UA 10/06/2024 1.020  1.005 - 1.030 Final    pH, UA 10/06/2024 6.0  4.5, 5.0, 5.5, 6.0, 6.5, 7.0, 7.5, 8.0 Final    Leukocytes, UA 10/06/2024 Negative  Negative Final    Nitrite, UA 10/06/2024 Negative  Negative Final    Protein, UA 10/06/2024 Negative  Negative mg/dl Final    Glucose, UA 10/06/2024 Negative  Negative mg/dl Final    Ketones, UA 10/06/2024 Negative  Negative mg/dl Final    Urobilinogen, UA 10/06/2024 <2.0  <2.0 mg/dl mg/dl Final    Bilirubin, UA 10/06/2024 Negative  Negative Final    Occult Blood, UA 10/06/2024 Negative  Negative Final       - No language barrier.   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    PSH:   has a past surgical history that includes Total colectomy; COLECTOMY TOTAL; IR PICC placement single lumen (3/1/2022); and IR drainage tube placement (12/27/2023).     Social History:  Tobacco Use: Low Risk  (11/26/2024)    Patient History     Smoking Tobacco Use: Never     Smokeless Tobacco Use: Never     Passive Exposure: Never     Alcohol Use: Not At Risk (7/9/2020)    [facility], [facility]    AUDIT-C     Frequency of Alcohol Consumption: Monthly or less     Average Number of Drinks: 1 or 2     Frequency of  Binge Drinking: Never     No illicit use       ROS:  Pertinent positives/negatives: .     Some ROS may be present in the HPI and would take precedent over these standard questions asked below.   Review of Systems   Constitutional:  Negative for chills and fever.   Gastrointestinal:  Positive for abdominal pain. Negative for nausea and vomiting.   Genitourinary:  Positive for difficulty urinating. Negative for dysuria.   Neurological:  Positive for syncope.        CONSTITUTIONAL:  No lethargy. No unexpected weight loss. No change in behavior.  EYES:  No pain, redness, or discharge. No loss of vision. No orbital trauma or pain.   ENT:  No tinnitus or decreased hearing. No epistaxis/purulent rhinorrhea. No voice change, airway closing, trismus.   CARDIOVASCULAR:  No chest pain. No skin mottling or pallor. No change in exertional capacity  RESPIRATORY:  No hemoptysis. No paroxysmal nocturnal dyspnea. No stridor. No apnea or bluing.   GASTROINTESTINAL:  No vomiting, diarrhea. No distension. No melena. No hematochezia.   GENITOURINARY:  No nocturia. No hematuria or foul smelling or cloudy urine. No discharge. No sores/adenopathy.   MUSCULOSKELETAL:  No contracture.  No new deformity.   INTEGUMENTARY:  No swelling. No unexpected contuns. No abrasions. No lymphangitis.  NEUROLOGIC:  No meningismus. No new numbness of the extremities. No new focal weakness. No postural instability  PSYCHIATRIC:  No SI HI AVH  HEMATOLOGICAL:  No bleeding. No petechiae. No bruising.  ALLERGIES:  No urticaria. No sudden abd cramping. No stridor.    PE:     Physical exam highlights:   Physical Exam       Vitals:    11/25/24 2255   BP: 123/75   BP Location: Right arm   Pulse: 98   Resp: 18   Temp: 97.6 °F (36.4 °C)   TempSrc: Oral   SpO2: 100%     Vitals reviewed by me.   Nursing note reviewed  Chaperone present for all sensitive exam.  Const: No acute distress. Alert. Nontoxic. Not diaphoretic.    HEENT: External ears normal. No protrusion  drainage swelling. Nose normal. No drainage/traumatic deformity. MM. Mouth with baseline/symmetric movement. No trismus.   Eyes: No squinting. No icterus. No tearing/swelling/drainage. Tracks through the room with normal EOM.   Neck: ROM normal. No rigidity. No meningismus.  Cards: Rate as per vitals Compared to monitor sinus unless documented. Regular Well perfused.  Pulm: Effort and excursion normal. No distress. No audible wheezing/no stridor. Normal resp rate without retraction or change in work of breathing.  Abd: No distension beyond baseline. No fluctuant wave. Patient without peritoneal pain with shifting/bumping the bed. Post surgical scars. Soft.   MSK: ROM normal baseline. No deformity. No contractures from baseline.   Skin: No new rashes visible. Well perfused. No wounds visualized on exposed skin  Neuro: Nonfocal. Baseline. CN grossly intact. Moving all four with coordination.   Psych: Normal behavior and affect.        A:  - Nursing note reviewed.    Ddx and MDM  Considered diagnoses      No urinary retention    UA r/o UTI  None    Check labs, discuss CT, try to avoid, many prior rads exposure    Feeling fine  No return  No recurrence during 3 hr monitor    Syncope likely vasovagal    D/c holter    F/u      Dispo decision       My conversation with consultant reveals:        Decision rules:                           My read of the XR/CT scan reveals:     No orders to display       Orders Placed This Encounter   Procedures    Urine culture    CBC and differential    Comprehensive metabolic panel    UA (URINE) with reflex to Scope    CK    Urine Microscopic    Holter monitor    ECG 12 lead     Labs Reviewed   CBC AND DIFFERENTIAL - Abnormal       Result Value Ref Range Status    WBC 13.49 (*) 4.31 - 10.16 Thousand/uL Final    RBC 6.13 (*) 3.88 - 5.62 Million/uL Final    Hemoglobin 11.6 (*) 12.0 - 17.0 g/dL Final    Hematocrit 38.1  36.5 - 49.3 % Final    MCV 62 (*) 82 - 98 fL Final    MCH 18.9 (*) 26.8 -  34.3 pg Final    MCHC 30.4 (*) 31.4 - 37.4 g/dL Final    RDW 17.4 (*) 11.6 - 15.1 % Final    MPV 9.0  8.9 - 12.7 fL Final    Platelets 345  149 - 390 Thousands/uL Final    nRBC 0  /100 WBCs Final    Segmented % 72  43 - 75 % Final    Immature Grans % 0  0 - 2 % Final    Lymphocytes % 18  14 - 44 % Final    Monocytes % 8  4 - 12 % Final    Eosinophils Relative 1  0 - 6 % Final    Basophils Relative 1  0 - 1 % Final    Absolute Neutrophils 9.80 (*) 1.85 - 7.62 Thousands/µL Final    Absolute Immature Grans 0.03  0.00 - 0.20 Thousand/uL Final    Absolute Lymphocytes 2.37  0.60 - 4.47 Thousands/µL Final    Absolute Monocytes 1.03  0.17 - 1.22 Thousand/µL Final    Eosinophils Absolute 0.17  0.00 - 0.61 Thousand/µL Final    Basophils Absolute 0.09  0.00 - 0.10 Thousands/µL Final   COMPREHENSIVE METABOLIC PANEL - Abnormal    Sodium 135  135 - 147 mmol/L Final    Potassium 3.0 (*) 3.5 - 5.3 mmol/L Final    Comment: Slightly Hemolyzed:Results may be affected.    Chloride 99  96 - 108 mmol/L Final    CO2 27  21 - 32 mmol/L Final    ANION GAP 9  4 - 13 mmol/L Final    BUN 15  5 - 25 mg/dL Final    Creatinine 1.21  0.60 - 1.30 mg/dL Final    Comment: Standardized to IDMS reference method    Glucose 115  65 - 140 mg/dL Final    Comment: If the patient is fasting, the ADA then defines impaired fasting glucose as > 100 mg/dL and diabetes as > or equal to 123 mg/dL.    Calcium 10.0  8.4 - 10.2 mg/dL Final    AST 49 (*) 13 - 39 U/L Final    Comment: Slightly Hemolyzed:Results may be affected.    ALT 29  7 - 52 U/L Final    Comment: Specimen collection should occur prior to Sulfasalazine administration due to the potential for falsely depressed results.     Alkaline Phosphatase 53  34 - 104 U/L Final    Total Protein 7.5  6.4 - 8.4 g/dL Final    Albumin 5.0  3.5 - 5.0 g/dL Final    Total Bilirubin 0.91  0.20 - 1.00 mg/dL Final    Comment: Use of this assay is not recommended for patients undergoing treatment with eltrombopag due to  the potential for falsely elevated results.  N-acetyl-p-benzoquinone imine (metabolite of Acetaminophen) will generate erroneously low results in samples for patients that have taken an overdose of Acetaminophen.    eGFR 79  ml/min/1.73sq m Final    Narrative:     National Kidney Disease Foundation guidelines for Chronic Kidney Disease (CKD):     Stage 1 with normal or high GFR (GFR > 90 mL/min/1.73 square meters)    Stage 2 Mild CKD (GFR = 60-89 mL/min/1.73 square meters)    Stage 3A Moderate CKD (GFR = 45-59 mL/min/1.73 square meters)    Stage 3B Moderate CKD (GFR = 30-44 mL/min/1.73 square meters)    Stage 4 Severe CKD (GFR = 15-29 mL/min/1.73 square meters)    Stage 5 End Stage CKD (GFR <15 mL/min/1.73 square meters)  Note: GFR calculation is accurate only with a steady state creatinine   URINALYSIS WITH REFLEX TO SCOPE - Abnormal    Color, UA Yellow   Final    Clarity, UA Clear   Final    Specific Gravity, UA >=1.030  1.005 - 1.030 Final    pH, UA 6.0  4.5, 5.0, 5.5, 6.0, 6.5, 7.0, 7.5, 8.0 Final    Leukocytes, UA Negative  Negative Final    Nitrite, UA Negative  Negative Final    Protein, UA 30 (1+) (*) Negative mg/dl Final    Glucose, UA Negative  Negative mg/dl Final    Ketones, UA Trace (*) Negative mg/dl Final    Urobilinogen, UA <2.0  <2.0 mg/dl mg/dl Final    Bilirubin, UA Negative  Negative Final    Occult Blood, UA Negative  Negative Final   CK - Abnormal    Total  (*) 39 - 308 U/L Final   URINE MICROSCOPIC - Abnormal    RBC, UA None Seen  None Seen, 0-1, 1-2, 2-4, 0-5 /hpf Final    WBC, UA 0-1  None Seen, 0-1, 1-2, 0-5, 2-4 /hpf Final    Epithelial Cells Occasional  None Seen, Occasional /hpf Final    Bacteria, UA Occasional  None Seen, Occasional /hpf Final    MUCUS THREADS Occasional (*) None Seen Final    Hyaline Casts, UA 10-20 (*) (none) /lpf Final   URINE CULTURE       *Each of these labs was reviewed. Particular standout labs will be noted in the ED Course above     Final Diagnosis:  1.  Vasovagal syncope    2. Hypokalemia    3. High serum creatine kinase (CK)    4. Heavy exercise          P:  - hospital tx includes   Medications - No data to display        - disposition  Time reflects when diagnosis was documented in both MDM as applicable and the Disposition within this note       Time User Action Codes Description Comment    11/26/2024  1:06 AM Shabbir Shaver [R55] Vasovagal syncope     11/26/2024  1:07 AM Shabbir Shaver [E87.6] Hypokalemia     11/26/2024  1:07 AM Shabbir Shaver [R74.8] High serum creatine kinase (CK)     11/26/2024  1:08 AM Shabbir Shaver [Z78.9] Heavy exercise           ED Disposition       ED Disposition   Discharge    Condition   Stable    Date/Time   Tue Nov 26, 2024  1:06 AM    Comment   Nico Cruz discharge to home/self care.                   Follow-up Information    None         - patient will call their PCP to let them know they were in the emergency department. We discuss return precautions and patient is agreeable with plan and aformentioned disposition.       - additional treatment intended, if consistent with primary provider:  - patient to follow with :      Discharge Medication List as of 11/26/2024  1:11 AM        CONTINUE these medications which have NOT CHANGED    Details   diphenoxylate-atropine (LOMOTIL) 2.5-0.025 mg per tablet Take 1 tablet by mouth 4 (four) times a day as needed for diarrhea for up to 10 doses, Starting Sun 12/31/2023, Normal      DULoxetine (CYMBALTA) 60 mg delayed release capsule Take 1 capsule (60 mg total) by mouth daily, Starting Wed 10/30/2024, Normal      polyethylene glycol (MIRALAX) 17 g packet Take 17 g by mouth daily Can take twice daily as needed, Starting Sun 5/26/2024, Normal      prochlorperazine (COMPAZINE) 5 mg tablet Take 1 tablet (5 mg total) by mouth every 6 (six) hours as needed for nausea or vomiting, Starting Wed 2/28/2024, Normal      Tofacitinib Citrate ER (Xeljanz XR) 11 MG TB24 Take 11  "mg by mouth in the morning, Historical [facility]           Outpatient Discharge Orders   Holter monitor   Standing Status: Future Standing Exp. Date: 11/26/28     Prior to Admission Medications   Prescriptions Last Dose Informant Patient Reported? Taking?   DULoxetine (CYMBALTA) 60 mg delayed release capsule   No No   Sig: Take 1 capsule (60 mg total) by mouth daily   Tofacitinib Citrate ER (Xeljanz XR) 11 MG TB24   Yes No   Sig: Take 11 mg by mouth in the morning   diphenoxylate-atropine (LOMOTIL) 2.5-0.025 mg per tablet   No No   Sig: Take 1 tablet by mouth 4 (four) times a day as needed for diarrhea for up to 10 doses   polyethylene glycol (MIRALAX) 17 g packet   No No   Sig: Take 17 g by mouth daily Can take twice daily as needed   Patient not taking: Reported on 11/26/2024   prochlorperazine (COMPAZINE) 5 mg tablet   No No   Sig: Take 1 tablet (5 mg total) by mouth every 6 (six) hours as needed for nausea or vomiting      Facility-Administered Medications: None       Portions of the record may have been created with voice recognition software. Occasional wrong word or \"sound a like\" substitutions may have occurred due to the inherent limitations of voice recognition software. Read the chart carefully and recognize, using context, where substitutions have occurred.    Electronically signed by:  MD Shabbir Gaffney MD  11/26/24 5800    "

## 2024-11-26 NOTE — PROGRESS NOTES
Name: Nico ANTONIO Cruz      : 1995      MRN: [id number]  Encounter Provider: [place] Cho  Encounter Date: 2024   Encounter department: [facility]  :  Assessment & Plan  Vasovagal syncope  Symptoms likely multifactorial- r/t dehydration, recent workout, straining, and also with fever, suspect possible viral illness.    Labs reviewed- will f/u with results of culture, but looking like it will be negative.       Fever, unspecified fever cause  Otherwise asymptomatic, will monitor for other symptoms       Ankylosing spondylitis of multiple sites in spine (HCC)  Stable, management per rheumatology       History of ulcerative colitis  Management per GI              History of Present Illness     Here today for ER follow up.  States he worked a 12 hour shift, went to the gym afterward, and then was at home relaxing.  Felt the urge to urinate with a tingling sensation in his groin, and went to use the bathroom.  Was unable to urinate, and started to feel very woozy and light headed.  Called for his girlfriend and had a syncopal episode.  Went to ER, labs done.  He is feeling okay now.  Will be going for Holter monitor.       Review of Systems   Constitutional:  Negative for chills, fatigue and fever.   HENT:  Negative for congestion, ear pain, postnasal drip, rhinorrhea, sinus pressure and sore throat.    Respiratory:  Negative for cough, shortness of breath and wheezing.    Cardiovascular:  Negative for chest pain.   Gastrointestinal:  Negative for abdominal pain, diarrhea, nausea and vomiting.   Musculoskeletal:  Negative for arthralgias.   Skin:  Negative for rash.   Neurological:  Positive for syncope. Negative for headaches.     Current Outpatient Medications on File Prior to Visit   Medication Sig Dispense Refill    diphenoxylate-atropine (LOMOTIL) 2.5-0.025 mg per tablet Take 1 tablet by mouth 4 (four) times a day as needed for diarrhea for up to 10 doses 10 tablet 0     "DULoxetine (CYMBALTA) 60 mg delayed release capsule Take 1 capsule (60 mg total) by mouth daily 90 capsule 1    prochlorperazine (COMPAZINE) 5 mg tablet Take 1 tablet (5 mg total) by mouth every 6 (six) hours as needed for nausea or vomiting 90 tablet 1    Tofacitinib Citrate ER (Xeljanz XR) 11 MG TB24 Take 11 mg by mouth in the morning      polyethylene glycol (MIRALAX) 17 g packet Take 17 g by mouth daily Can take twice daily as needed (Patient not taking: Reported on 11/26/2024) 30 each 0     Current Facility-Administered Medications on File Prior to Visit   Medication Dose Route Frequency Provider Last Rate Last Admin    [DISCONTINUED] acetaminophen (Ofirmev) injection 1,000 mg  1,000 mg Intravenous Once Shabbir Shaver MD        [DISCONTINUED] diphenhydrAMINE (BENADRYL) injection 25 mg  25 mg Intravenous Once Shabbir Shaver MD        [DISCONTINUED] Famotidine (PF) (PEPCID) injection 20 mg  20 mg Intravenous Once Shabbir Shaver MD        [DISCONTINUED] ketorolac (TORADOL) injection 15 mg  15 mg Intravenous Once Shabbir Shaver MD        [DISCONTINUED] metoclopramide (REGLAN) injection 10 mg  10 mg Intravenous Once Shabbir Shaver MD        [DISCONTINUED] sodium chloride 0.9 % bolus 1,000 mL  1,000 mL Intravenous Once Shabbir Shaver MD          Social History     Tobacco Use    Smoking status: Never     Passive exposure: Never    Smokeless tobacco: Never   Vaping Use    Vaping status: Never Used   Substance and Sexual Activity    Alcohol use: Not Currently    Drug use: No    Sexual activity: Not on file        Objective   /80   Pulse 90   Temp 100.4 °F (38 °C)   Resp 18   Ht 5' 9\" (1.753 m)   Wt 81.6 kg (179 lb 12.8 oz)   BMI 26.55 kg/m²      Physical Exam  Vitals and nursing note reviewed.   Constitutional:       General: He is not in acute distress.     Appearance: Normal appearance.   HENT:      Head: Normocephalic and atraumatic.      Right Ear: Tympanic membrane normal.      " Left Ear: Tympanic membrane normal.      Nose: Nose normal.      Mouth/Throat:      Pharynx: Oropharynx is clear.   Eyes:      Conjunctiva/sclera: Conjunctivae normal.   Neck:      Vascular: No carotid bruit.   Cardiovascular:      Rate and Rhythm: Normal rate and regular rhythm.      Pulses: Normal pulses.      Heart sounds: Normal heart sounds. No murmur heard.  Pulmonary:      Effort: Pulmonary effort is normal.      Breath sounds: Normal breath sounds.   Skin:     General: Skin is warm and dry.   Neurological:      Mental Status: He is alert.   Psychiatric:         Mood and Affect: Mood normal.         Behavior: Behavior normal.

## 2024-11-26 NOTE — DISCHARGE INSTRUCTIONS
Twice as much water wash out that CK protect the kidneys    Call central scheduling for Holter monitor    Eat higher potassium diet

## 2024-11-27 LAB — BACTERIA UR CULT: NORMAL

## 2024-11-28 LAB
ATRIAL RATE: 89 BPM
P AXIS: 57 DEGREES
PR INTERVAL: 186 MS
QRS AXIS: 12 DEGREES
QRSD INTERVAL: 102 MS
QT INTERVAL: 380 MS
QTC INTERVAL: 462 MS
T WAVE AXIS: 41 DEGREES
VENTRICULAR RATE: 89 BPM

## 2024-11-28 PROCEDURE — 93010 ELECTROCARDIOGRAM REPORT: CPT | Performed by: INTERNAL MEDICINE

## 2024-12-04 ENCOUNTER — APPOINTMENT (OUTPATIENT)
Dept: COLORECTAL SURGERY | Facility: CLINIC | Age: 29
End: 2024-12-04

## 2024-12-29 DIAGNOSIS — F41.1 GAD (GENERALIZED ANXIETY DISORDER): ICD-10-CM

## 2024-12-30 RX ORDER — DULOXETIN HYDROCHLORIDE 60 MG/1
60 CAPSULE, DELAYED RELEASE ORAL DAILY
Qty: 90 CAPSULE | Refills: 1 | Status: SHIPPED | OUTPATIENT
Start: 2024-12-30

## 2025-01-06 ENCOUNTER — PATIENT MESSAGE (OUTPATIENT)
Dept: FAMILY MEDICINE CLINIC | Facility: CLINIC | Age: 30
End: 2025-01-06

## 2025-01-07 NOTE — PATIENT COMMUNICATION
Pt called in regards to his [employer] message. Pt states that his Cymbalta does not seem to be helping manage his anxiety well. Pt is interested in changing or adding another medication like Wellbutrin. Please advise.

## 2025-01-09 ENCOUNTER — TELEMEDICINE (OUTPATIENT)
Dept: FAMILY MEDICINE CLINIC | Facility: CLINIC | Age: 30
End: 2025-01-09
Payer: COMMERCIAL

## 2025-01-09 DIAGNOSIS — F33.9 RECURRENT DEPRESSION (HCC): Primary | ICD-10-CM

## 2025-01-09 DIAGNOSIS — F41.1 GAD (GENERALIZED ANXIETY DISORDER): ICD-10-CM

## 2025-01-09 DIAGNOSIS — Z87.19 HISTORY OF ULCERATIVE COLITIS: ICD-10-CM

## 2025-01-09 PROCEDURE — 99214 OFFICE O/P EST MOD 30 MIN: CPT | Performed by: FAMILY MEDICINE

## 2025-01-09 RX ORDER — BUPROPION HYDROCHLORIDE 150 MG/1
150 TABLET ORAL EVERY MORNING
Qty: 90 TABLET | Refills: 1 | Status: SHIPPED | OUTPATIENT
Start: 2025-01-09 | End: 2025-07-08

## 2025-01-09 NOTE — PROGRESS NOTES
Virtual Regular Visit  Name: Nico Cruz      : 1995      MRN: [id number]  Encounter Provider: Smooth Dietz DO  Encounter Date: 2025   Encounter department: [facility]      Verification of patient location:  Patient is located at Other in the following state in which I hold an active license [place] :  Assessment & Plan  Recurrent depression (HCC)      Orders:    buPROPion (WELLBUTRIN XL) 150 mg 24 hr tablet; Take 1 tablet (150 mg total) by mouth every morning    CAR (generalized anxiety disorder)         History of ulcerative colitis         If gets issues with wellbutrin, next option could be buspar  Aware wellbutrin primarily for depression  Recheck 1m  Wean cymbalta 60mg 2qd to 1qd for 5d then stop    Encounter provider Smooth Dietz DO    The patient was identified by name and date of birth. Nico Cruz was informed that this is a telemedicine visit and that the visit is being conducted through the Epic Embedded platform. He agrees to proceed..  My office door was closed. No one else was in the room.  He acknowledged consent and understanding of privacy and security of the video platform. The patient has agreed to participate and understands they can discontinue the visit at any time.    Patient is aware this is a billable service.     History of Present Illness     HPI  Cymbalta 120mg qd taken for past 3weeks  No improvement  Irritable  Worries  Has depression also  No seizures in past per pt    Review of Systems   Neurological:  Negative for seizures.   Psychiatric/Behavioral:  Positive for dysphoric mood. The patient is nervous/anxious.        Objective   There were no vitals taken for this visit.    Physical Exam  Constitutional:       General: He is not in acute distress.     Appearance: He is not diaphoretic.   Eyes:      General: No scleral icterus.  Neck:      Comments: No stiffness  Pulmonary:      Effort: Pulmonary effort is normal. No respiratory distress.       Breath sounds: No stridor.   Musculoskeletal:      Cervical back: No rigidity.   Skin:     Coloration: Skin is not pale.   Neurological:      Mental Status: He is alert.   Psychiatric:         Mood and Affect: Mood normal.         Behavior: Behavior normal.         Visit Time  Total Visit Duration: 21

## 2025-01-10 NOTE — ASSESSMENT & PLAN NOTE
Orders:    buPROPion (WELLBUTRIN XL) 150 mg 24 hr tablet; Take 1 tablet (150 mg total) by mouth every morning

## 2025-01-20 ENCOUNTER — TELEMEDICINE (OUTPATIENT)
Dept: FAMILY MEDICINE CLINIC | Facility: CLINIC | Age: 30
End: 2025-01-20
Payer: COMMERCIAL

## 2025-01-20 DIAGNOSIS — F41.1 GAD (GENERALIZED ANXIETY DISORDER): ICD-10-CM

## 2025-01-20 DIAGNOSIS — Z87.19 HISTORY OF ULCERATIVE COLITIS: ICD-10-CM

## 2025-01-20 DIAGNOSIS — F33.9 RECURRENT DEPRESSION (HCC): Primary | ICD-10-CM

## 2025-01-20 PROCEDURE — 99214 OFFICE O/P EST MOD 30 MIN: CPT | Performed by: FAMILY MEDICINE

## 2025-01-20 RX ORDER — DULOXETIN HYDROCHLORIDE 60 MG/1
60 CAPSULE, DELAYED RELEASE ORAL DAILY
Start: 2025-01-20

## 2025-01-20 NOTE — PROGRESS NOTES
Virtual Regular Visit  Name: Nico Cruz      : 1995      MRN: [id number]  Encounter Provider: Smooth Dietz DO  Encounter Date: 2025   Encounter department: [facility]      Verification of patient location:  Patient is located at Home in the following state in which I hold an active license [place] :  Assessment & Plan  CAR (generalized anxiety disorder)  Feels better on wellbutrin plus cymbalta  Risks advised  May need psychiatrist in future if not responding  Seizure risk advised  Orders:    DULoxetine (CYMBALTA) 60 mg delayed release capsule; Take 1 capsule (60 mg total) by mouth daily    Recurrent depression (HCC)  improved         History of ulcerative colitis  Unchanged       Risk of seizures and wellbutrin levels effect with concomitant use of cymbalta was advised    We agreed on up to 60mg max of cymbalta with up to 150mg max of wellbutrin at the same time.    If tolerates, f/u q6m  F/u 1m if needs change    Encounter provider Smooth Dietz DO    The patient was identified by name and date of birth. Nico Torresros was informed that this is a telemedicine visit and that the visit is being conducted through the Epic Embedded platform. He agrees to proceed..  My office door was closed. No one else was in the room.  He acknowledged consent and understanding of privacy and security of the video platform. The patient has agreed to participate and understands they can discontinue the visit at any time.    Patient is aware this is a billable service.     History of Present Illness     HPI  Started wellbutrin and immediate benefit with anxiety per pt  More energy and motivation  Weaned cymbalta at same time  More irritable, flu like, migraine, tinnitus since coming off cymbalta  Seemed ok on cymbalta 60mg plus wellbutrin 150mg/d at the same time though full wean was suggested    Review of Systems   Neurological:  Negative for tremors and seizures.     Current Outpatient Medications    Medication Instructions    buPROPion (WELLBUTRIN XL) 150 mg, Oral, Every morning    diphenoxylate-atropine (LOMOTIL) 2.5-0.025 mg per tablet 1 tablet, Oral, 4 times daily PRN    DULoxetine (CYMBALTA) 60 mg, Oral, Daily    prochlorperazine (COMPAZINE) 5 mg, Oral, Every 6 hours PRN    Xeljanz XR 11 mg, Daily         Objective   There were no vitals taken for this visit.    Physical Exam  Constitutional:       General: He is not in acute distress.     Appearance: He is not diaphoretic.   Eyes:      General: No scleral icterus.  Neck:      Comments: No stiffness  Pulmonary:      Effort: Pulmonary effort is normal. No respiratory distress.      Breath sounds: No stridor.   Musculoskeletal:      Cervical back: No rigidity.   Skin:     Coloration: Skin is not pale.   Neurological:      Mental Status: He is alert.   Psychiatric:         Mood and Affect: Mood normal.         Behavior: Behavior normal.         Visit Time  Total Visit Duration: 22

## 2025-01-21 NOTE — ASSESSMENT & PLAN NOTE
Feels better on wellbutrin plus cymbalta  Risks advised  May need psychiatrist in future if not responding  Seizure risk advised  Orders:    DULoxetine (CYMBALTA) 60 mg delayed release capsule; Take 1 capsule (60 mg total) by mouth daily

## 2025-02-06 NOTE — ASSESSMENT & PLAN NOTE
FEMALE ANNUAL EXAM NOTE      HISTORY    Olinda Colon is a 41 year old female who presents for an annual exam.  Patient sees OB/GYN.  Exercising some.  No change in family history.    MEDICAL HISTORY    Past Medical History:   Diagnosis Date    Abnormal Papanicolaou smear of cervix with positive human papilloma virus (HPV) test     Genital warts     Joint pain     Migraine     hormonal with periods    Plantar wart     Right upper quadrant abdominal pain of unknown etiology        SURGICAL HISTORY    Past Surgical History:   Procedure Laterality Date     delivery only  2017     section, classic      Colonoscopy  2009    Flexible sigmoidoscopy  2008    Ganglion cyst excision Left        SOCIAL HISTORY    Social History     Tobacco Use    Smoking status: Never     Passive exposure: Never    Smokeless tobacco: Never   Vaping Use    Vaping status: never used   Substance Use Topics    Alcohol use: Yes     Comment: occasionally    Drug use: No       FAMILY HISTORY    Family History   Problem Relation Age of Onset    Osteoporosis Mother     Hypertension Father     Cancer, Lung Maternal Grandfather     Cancer, Lung Paternal Grandmother     Myocardial Infarction Paternal Grandfather     Osteoporosis Maternal Aunt     Cancer, Breast Neg Hx     Cancer, Colon Neg Hx     Cancer, Ovarian Neg Hx     Cancer, Endometrial Neg Hx        MEDICATIONS    Current Outpatient Medications   Medication Sig    ascorbic acid (Vitamin C) 250 MG tablet Take 250 mg by mouth daily.    ipratropium (ATROVENT) 0.06 % nasal spray Spray 2 sprays in each nostril in the morning and 2 sprays at noon and 2 sprays in the evening. Do all this for 10 days. (Patient not taking: Reported on 2025)    Cholecalciferol 125 mcg (5,000 units) tablet      No current facility-administered medications for this visit.       ALLERGIES    ALLERGIES:   Allergen Reactions    Latex HIVES    Kiwi   (Food Or Med) Other (See Comments)     Recurrent,    History of UC with multiple extensive abdominal surgeries, creation of a J-pouch and ileostomy  • Follows with specialist at [facility], reports that he does have surgery planned for April for revision  · No imaging on this admission, will obtain if pain repeats  · 2/26/2023: CT abdomen: Post colectomy  J-pouch is seen extending from the right lower quadrant to the rectum with similar appearance of mural thickening comparison to the prior exams   No other potential acute pathology visualized on CT of the abdomen and pelvis with IV with oral contrast  Minocycline HIVES       REVIEW OF SYSTEMS       All other Review of Systems negative.      HEALTH MAINTENANCE ISSUES: Updated and reviewed.    PHYSICAL EXAMINATION    Vital Signs:    Vitals:    02/06/25 1340   BP: 110/80   BP Location: LUE - Left upper extremity   Patient Position: Sitting   Cuff Size: Regular   Pulse: 77   Resp: 14   Temp: 97.7 °F (36.5 °C)   TempSrc: Tympanic   SpO2: 98%   Weight: 65 kg (143 lb 6 oz)   Height: 5' 4\" (1.626 m)   LMP: 09/05/2024     General:  Well-developed, well-nourished. In no apparent distress.    Eyes:  PERRL, EOMI(Pupils equal, round, reactive to light, extraocular movements intact). Conjunctivae pink. Sclerae anicteric.    HENT:  Normocephalic, atraumatic. Bilateral external ears are normal. Mucosal membranes moist. External nose is normal. Oropharynx is clear.    Neck:  Supple. Nontender. Normal range of motion. Trachea midline. No thyromegaly.   Respiratory:  Normal respiratory effort. No chest wall tenderness. Lungs clear to auscultation bilaterally. Symmetrical chest expansion.    Cardiovascular:  Regular rate and rhythm. No murmurs, rubs, or gallops. Normal S1 and S2. No S3 or S4. No peripheral edema.   Gastrointestinal:  Soft. Nontender. Non-distended. Normal bowel sounds. No pulsatile or other abdominal masses. No hepatosplenomegaly or splenomegaly.      Musculoskeletal:  No clubbing or cyanosis. Full range of motion in all 4 extremities proximal and distal.   Neurologic:  Alert and oriented times 3. Gait is normal. Normal sensory function. No motor deficits in all 4 extremities.   Integumentary:  Warm. Dry. Pink. No rashes or lesions. No wounds.     Psychiatric: Cooperative. Appropriate mood and affect. Normal judgment.        ASSESSMENT/PLAN    1. Routine general medical examination at health care facility  General health maintenance discussed.  Recommend exercise most days of the week some type of cardio and resistance training for at least 30 minutes of both.   Calcium and vitamin D3 recommendations reviewed.  Follow-up with OB/GYN as recommended.  She is going to hold off on labs for this year.  Should she change her mind at any time she can call and let us know and I will consider ordering it for her at that time.  Mammogram is ordered.    Follow-up in 1 year for annual exam; sooner if needed.  Patient verbalized an understanding.          Patricia Kay NP

## 2025-02-17 NOTE — H&P PST ADULT - IN ACCORDANCE WITH [place] STATE LAW, WE OFFER EVERY PATIENT A HEPATITIS C TEST. WOULD YOU LIKE TO BE TESTED TODAY?
Opt out Application Tool (Optional): Liquid Nitrogen Sprayer Render Note In Bullet Format When Appropriate: No Show Applicator Variable?: Yes Aperture Size (Optional): C Consent: The patient's consent was obtained including but not limited to risks of crusting, scabbing, blistering, scarring, darker or lighter pigmentary change, recurrence, incomplete removal and infection. Detail Level: Detailed Number Of Freeze-Thaw Cycles: 1 freeze-thaw cycle Post-Care Instructions: I reviewed with the patient in detail post-care instructions. Patient is to wear sunprotection, and avoid picking at any of the treated lesions. Pt may apply Vaseline to crusted or scabbing areas. Duration Of Freeze Thaw-Cycle (Seconds): 0

## 2025-03-17 ENCOUNTER — TELEPHONE (OUTPATIENT)
Age: 30
End: 2025-03-17

## 2025-03-17 NOTE — TELEPHONE ENCOUNTER
Pt is asking if his Wellbutrin can be changed from extended release to immediate release. He notices towards the end of the day he has more anxiety. He feels like with the frequency of his bowel movements he might not be getting enough of the medication absorbed and thinks the immediate release one will be better for this. Please, advise.

## 2025-03-18 DIAGNOSIS — F33.9 RECURRENT DEPRESSION (HCC): ICD-10-CM

## 2025-03-18 RX ORDER — BUPROPION HYDROCHLORIDE 75 MG/1
75 TABLET ORAL 2 TIMES DAILY
Qty: 180 TABLET | Refills: 1 | Status: SHIPPED | OUTPATIENT
Start: 2025-03-18

## 2025-04-03 ENCOUNTER — CONSULT (OUTPATIENT)
Age: 30
End: 2025-04-03
Payer: COMMERCIAL

## 2025-04-03 ENCOUNTER — TELEPHONE (OUTPATIENT)
Age: 30
End: 2025-04-03

## 2025-04-03 VITALS
DIASTOLIC BLOOD PRESSURE: 88 MMHG | OXYGEN SATURATION: 100 % | HEART RATE: 91 BPM | SYSTOLIC BLOOD PRESSURE: 136 MMHG | BODY MASS INDEX: 25.92 KG/M2 | HEIGHT: 69 IN | WEIGHT: 175 LBS

## 2025-04-03 DIAGNOSIS — K59.00 CONSTIPATION, UNSPECIFIED CONSTIPATION TYPE: Primary | ICD-10-CM

## 2025-04-03 PROCEDURE — 99214 OFFICE O/P EST MOD 30 MIN: CPT | Performed by: PHYSICIAN ASSISTANT

## 2025-04-03 NOTE — PROGRESS NOTES
Name: Nico Cruz      : 1995      MRN: [id number]  Encounter Provider: Susy Gomez PA-C  Encounter Date: 4/3/2025   Encounter department: [facility] [place]  :    29-year-old male with history of ulcerative colitis status post total proctocolectomy around  with postoperative complications requiring multiple revisions, ileostomy at 1 point however eventually this was taken down 2023.  Patient presents after seeing his [place] colorectal provider around 10/2024 where he underwent dilation.  Assessment & Plan  Constipation, unspecified constipation type  Patient has had episodes of severe constipation and fecal impaction noted on CT scan most recently 10/2024.  After this visit he followed up with [place] surgeon however he will likely not be available moving forward therefore patient would like to follow with more local providers.  He reports after dilation he had significant improvement in abdominal symptoms such as constipation, bloating, nausea for about 4 months.  He is requesting repeat scope.    -Patient is hopeful for repeat pouchoscopy with possible dilation since the surgeon he is following with in [place] will not be available to continue to do this moving forward.  -He reports after previous dilation 10/2024 he had significant improvement in his bowel movements as well as other GI symptoms.  -I reached out to colorectal provider, Dr. Quevedo who he established with last year.    -In the meantime, I highly suspect his abdominal discomfort, nausea and even decreased appetite could be due to significant constipation after reviewing CT scans.  -I recommend patient start MiraLAX 1 capful daily and adjust as needed.  - Can continue magnesium citrate as needed or possible enema or suppository when symptoms are more severe.  - We discussed overflow diarrhea and recommend avoiding Lomotil is much as possible           History of Present Illness   Nico Cruz  is a 29 y.o. male who presents with history of ulcerative colitis status post total proctocolectomy around 2015 with ileal pouch anal anastomosis with multiple complications and revisions including redo of pouch anal anastomosis around 2023 with ileostomy which was then taken down 12/2023, ankylosing spondylitis on Xeljanz who was sent to the office after recent hospitalization last year when he was seen by GI team for abdominal pain found to have constipation.    Patient reports after last ER visit 10/2024 where he was found to have significant constipation he reached out to his colorectal surgeon in [place] and followed up with him.  He reports having a scope with dilation at that time.  He reports after his dilations he does very well with more regular bowel movements and improvement in abdominal pain, discomfort.  However this usually only last about 4 months.  Therefore over the last month or 2 he has been having worsening constipation with significant straining, passage of only smaller stools with associated nausea, decreased appetite.  He will use magnesium citrate which helps however often takes 3 of these to help.  He does not take anything for his bowels regularly.  He uses Lomotil when he has diarrhea however this is very rare.    Patient had established care with more local colorectal provider 1/2024.    Most recent lab work with stable mild anemia with hemoglobin 11.6.  Platelets normal.  CMP normal.  Went to the emergency room 10/2024 for abdominal pain and had a CT scan showing large amount of stool in the J-pouch without any focal narrowing or transition point noted    HPI    Review of Systems   All other systems reviewed and are negative.   A complete review of systems is negative other than that noted above in the HPI.         Objective   There were no vitals taken for this visit.    Physical Exam  Vitals reviewed.   Constitutional:       General: He is not in acute distress.     Appearance:  Normal appearance. He is not ill-appearing.   HENT:      Head: Normocephalic and atraumatic.   Eyes:      Conjunctiva/sclera: Conjunctivae normal.   Cardiovascular:      Rate and Rhythm: Normal rate and regular rhythm.      Heart sounds: No murmur heard.  Pulmonary:      Effort: Pulmonary effort is normal. No respiratory distress.      Breath sounds: Normal breath sounds.   Abdominal:      General: Abdomen is flat. There is no distension.      Palpations: Abdomen is soft.      Tenderness: There is abdominal tenderness. There is no guarding or rebound.      Comments: Epigastric/LUQ   Musculoskeletal:         General: No swelling.      Cervical back: Normal range of motion.      Right lower leg: No edema.      Left lower leg: No edema.   Skin:     General: Skin is warm.      Coloration: Skin is not jaundiced.   Neurological:      General: No focal deficit present.      Mental Status: He is alert and oriented to person, place, and time. Mental status is at baseline.   Psychiatric:         Mood and Affect: Mood normal.         Behavior: Behavior normal.           Lab Results: I personally reviewed relevant lab results.       Results for orders placed during the hospital encounter of 01/18/23    EGD    Impression  Antral erosions and erythema.  Biopsies obtained  Proximal duodenal erosions and erythema.  Biopsies obtained    RECOMMENDATION:  Await pathology results  Avoid NSAIDs.  Continue PPI          Dimitris Cuevas MD

## 2025-04-03 NOTE — TELEPHONE ENCOUNTER
Patient calling requesting a virtual appt / Explained due to the first appt it must be in person / Patient is a  and was mandated this morning so he may get called in, explained if able to keep this mornings appt do so and if he is called in then he would just have to go duty calls./ patient will plan on being they.

## 2025-04-04 ENCOUNTER — TELEPHONE (OUTPATIENT)
Age: 30
End: 2025-04-04

## 2025-04-04 NOTE — TELEPHONE ENCOUNTER
----- Message from Joan ARANGO sent at 4/4/2025  8:17 AM EDT -----    ----- Message -----  From: Susy Gomez PA-C  Sent: 4/4/2025   8:15 AM EDT  To: [facility]    Please call patient to help him schedule OV with Dr. Quevedo, thank you!!  ----- Message -----  From: Navneet Quevedo MD  Sent: 4/3/2025   1:04 PM EDT  To: Susy Gomez PA-C    Saw him once over a year ago so would recommend OV prior to any procedures  ----- Message -----  From: Susy Gomez PA-C  Sent: 4/3/2025  12:35 PM EDT  To: Navneet Quevedo MD    Hi Dr Quevedo,    This is a patient who establish care with you 1 time last year 1/2024 due to history of ulcerative colitis with total proctocolectomy complicated by multiple postoperative complications with revisions.  He went to the hospital 2 times over the last year for severe constipation and fecal impaction.  He followed up with his surgeon in [place] around 10/2024 and reports they performed a scope with dilation.  He had significant improvement in his symptoms for around 4 months however they are reoccurring now.  He will no longer be able to follow with the surgeon in [place] and is requesting that you will repeat a scope with potential dilation if indicated.  I told him I would reach out to you.  In the meantime I recommended he start daily MiraLAX.  I told him I am not sure if you would feel comfortable proceeding straight to a scope or if youd like to see him first before setting up.     Susy Gomez PA-C

## 2025-04-07 ENCOUNTER — NURSE TRIAGE (OUTPATIENT)
Age: 30
End: 2025-04-07

## 2025-04-07 DIAGNOSIS — F41.1 GAD (GENERALIZED ANXIETY DISORDER): ICD-10-CM

## 2025-04-07 NOTE — TELEPHONE ENCOUNTER
The patient has a hx of ulcerative colitis. Status post total proctocolectomy with ileal pouch anal anastomosis.  Last seen in the office on 1/31/24.    Called patient. He reports new/ worsening symptoms x4 days;  Fatigue.   Nausea. States that he is continuously gagging.  Constant epigastric abdominal discomfort and pressure.    Difficulty defecating and straining to produce a bowel movement. He is having 15-20 bowel movements. He states that the stool is watery with some solid stool. He usually has around 8 bowel movements.   He reports bright red blood upon wiping with every bowel movement.     Has concern because he had these symptoms in the past with stricture.      Denies vomiting, fever, and Chills. Advised him to present to the ED if symptoms worsen or change.

## 2025-04-07 NOTE — TELEPHONE ENCOUNTER
Regarding: extreme abdominal pain  ----- Message from Dale AMAYA sent at 4/7/2025  9:40 AM EDT -----  Pt calling to book an OV FU with Dr.Bowers isaac, he is have extreme abdominal pain and feels like food/drink is getting stuck in his intestine instead of passing through. He is currently booked for 05.01 and that is the soonest I have at either location.

## 2025-04-09 RX ORDER — DULOXETIN HYDROCHLORIDE 60 MG/1
60 CAPSULE, DELAYED RELEASE ORAL DAILY
Qty: 90 CAPSULE | Refills: 1 | Status: SHIPPED | OUTPATIENT
Start: 2025-04-09

## 2025-04-15 ENCOUNTER — TELEPHONE (OUTPATIENT)
Age: 30
End: 2025-04-15

## 2025-04-15 DIAGNOSIS — R11.2 NAUSEA VOMITING AND DIARRHEA: ICD-10-CM

## 2025-04-15 DIAGNOSIS — R19.7 NAUSEA VOMITING AND DIARRHEA: ICD-10-CM

## 2025-04-15 RX ORDER — PROCHLORPERAZINE MALEATE 5 MG/1
5 TABLET ORAL EVERY 8 HOURS PRN
Qty: 30 TABLET | Refills: 1 | Status: SHIPPED | OUTPATIENT
Start: 2025-04-15

## 2025-04-15 NOTE — TELEPHONE ENCOUNTER
Patient continues with abdominal discomfort, constipation, diarrhea, some bright red blood, but mainly nausea from J Pouch complications.  Wondering if you could send order for compazine in to [facility].  Please advise.

## 2025-04-15 NOTE — TELEPHONE ENCOUNTER
Refill sent.  Looks like he has been on this in the past as well.  Recommend continuing to adjust the MiraLAX to help in the bowel movements.  If any constipation currently would increase this.  Looks like he has appointment with colorectal in 4 weeks for possible endoscopic evaluation at that time based on their recs.

## 2025-04-16 ENCOUNTER — OFFICE VISIT (OUTPATIENT)
Dept: FAMILY MEDICINE CLINIC | Facility: CLINIC | Age: 30
End: 2025-04-16
Payer: COMMERCIAL

## 2025-04-16 VITALS
WEIGHT: 175.3 LBS | DIASTOLIC BLOOD PRESSURE: 78 MMHG | BODY MASS INDEX: 25.96 KG/M2 | HEIGHT: 69 IN | SYSTOLIC BLOOD PRESSURE: 132 MMHG

## 2025-04-16 DIAGNOSIS — R68.82 LOSS OF LIBIDO: ICD-10-CM

## 2025-04-16 DIAGNOSIS — R78.9 ABNORMAL HEMATOLOGY TEST RESULT: ICD-10-CM

## 2025-04-16 DIAGNOSIS — M45.0 ANKYLOSING SPONDYLITIS OF MULTIPLE SITES IN SPINE (HCC): ICD-10-CM

## 2025-04-16 DIAGNOSIS — K51.819 OTHER ULCERATIVE COLITIS WITH COMPLICATION (HCC): ICD-10-CM

## 2025-04-16 DIAGNOSIS — F33.9 RECURRENT DEPRESSION (HCC): Primary | ICD-10-CM

## 2025-04-16 PROBLEM — K74.69 OTHER CIRRHOSIS OF LIVER (HCC): Status: RESOLVED | Noted: 2025-04-16 | Resolved: 2025-04-16

## 2025-04-16 PROBLEM — K74.69 OTHER CIRRHOSIS OF LIVER (HCC): Status: ACTIVE | Noted: 2025-04-16

## 2025-04-16 PROCEDURE — 99215 OFFICE O/P EST HI 40 MIN: CPT | Performed by: FAMILY MEDICINE

## 2025-04-16 RX ORDER — BUPROPION HYDROCHLORIDE 150 MG/1
150 TABLET ORAL EVERY MORNING
Qty: 90 TABLET | Refills: 1 | Status: SHIPPED | OUTPATIENT
Start: 2025-04-16 | End: 2025-10-13

## 2025-04-16 NOTE — PROGRESS NOTES
Name: Nico Cruz      : 1995      MRN: [id number]  Encounter Provider: Smooth Dietz DO  Encounter Date: 2025   Encounter department: [facility]  :  Assessment & Plan  Loss of libido    Orders:    Testosterone; Future    Other ulcerative colitis with complication (HCC)  Gi f/u [facility]       Ankylosing spondylitis of multiple sites in spine (HCC)  Unchanged, not any different with cymbalta       Abnormal hematology test result  Hematology referral  Orders:    Ambulatory referral to Hematology / Oncology; Future    Recurrent depression (HCC)  Continue wellbutrin 150mg XL in am  Add 75mg wellbutrin  Risks with cymbalta aware  Could consider wellbutrin 300mg SL in future    Please stay on the cymbalta 60mg/d but increase the wellbutrin 150mg XL daily to 150mg XL plus an additional 75mg of bupropion daily and let me know if it works out.  Next step could be Wellbutrin 300mg XL daily without any 75mg/d.     Orders:    buPROPion (WELLBUTRIN XL) 150 mg 24 hr tablet; Take 1 tablet (150 mg total) by mouth every morning           History of Present Illness   HPI  Wellbturin 150mg qd, mood better and motivation better, better focused, noted improvement right away  Less craving for sugar  Feels irritable by the afternoon  More anxious by afternoon, daily, even when off of work  On cymbalta also, interaction risk aware    Notes loss of libido  Some reduced rigidity  Hx of repetitive CT of the ap    His rheumatologist notes abnormal CBC  Was suggested he get hematology eval  Elevated WBC but intermittent  Mild anemia   Plt wnl    Review of Systems   Constitutional:  Negative for fever.   Neurological:  Negative for seizures.     Family History   Problem Relation Age of Onset    Lung disease Neg Hx       Social History     Tobacco Use    Smoking status: Never     Passive exposure: Never    Smokeless tobacco: Never   Vaping Use    Vaping status: Never Used   Substance Use Topics    Alcohol use: Not  "Currently    Drug use: No      Current Outpatient Medications   Medication Instructions    buPROPion (WELLBUTRIN XL) 150 mg, Oral, Every morning    diphenoxylate-atropine (LOMOTIL) 2.5-0.025 mg per tablet 1 tablet, Oral, 4 times daily PRN    DULoxetine (CYMBALTA) 60 mg, Oral, Daily    prochlorperazine (COMPAZINE) 5 mg, Oral, Every 8 hours PRN    Xeljanz XR 11 mg, Daily     >>>>>>>>  Return if symptoms worsen or fail to improve.  >>>>>>>>    [POCT]  No results found for this or any previous visit (from the past 24 hours).    Visit Vitals  /78 (BP Location: Right arm, Patient Position: Sitting, Cuff Size: Large)   Ht 5' 9\" (1.753 m)   Wt 79.5 kg (175 lb 4.8 oz)   BMI 25.89 kg/m²   Smoking Status Never   BSA 1.95 m²        Objective   /78 (BP Location: Right arm, Patient Position: Sitting, Cuff Size: Large)   Ht 5' 9\" (1.753 m)   Wt 79.5 kg (175 lb 4.8 oz)   BMI 25.89 kg/m²      Physical Exam  Vitals and nursing note reviewed.   Constitutional:       General: He is not in acute distress.     Appearance: He is well-developed. He is not ill-appearing.   HENT:      Head: Normocephalic.      Right Ear: Tympanic membrane normal.      Left Ear: Tympanic membrane normal.   Eyes:      General: No scleral icterus.     Conjunctiva/sclera: Conjunctivae normal.   Cardiovascular:      Rate and Rhythm: Normal rate and regular rhythm.      Heart sounds: No murmur heard.  Pulmonary:      Effort: Pulmonary effort is normal. No respiratory distress.      Breath sounds: No wheezing.   Abdominal:      Palpations: Abdomen is soft.   Musculoskeletal:         General: No deformity.      Cervical back: Neck supple.   Skin:     General: Skin is warm and dry.      Coloration: Skin is not pale.   Neurological:      Mental Status: He is alert.      Motor: No weakness.      Gait: Gait normal.   Psychiatric:         Mood and Affect: Mood normal.         Behavior: Behavior normal.         Thought Content: Thought content normal. "       Administrative Statements   I have spent a total time of 41 minutes in caring for this patient on the day of the visit/encounter including Risks and benefits of tx options, Instructions for management, Patient and family education, Importance of tx compliance, Risk factor reductions, Impressions, Documenting in the medical record, and Obtaining or reviewing history  .

## 2025-04-16 NOTE — PATIENT INSTRUCTIONS
Please stay on the cymbalta 60mg/d but increase the wellbutrin 150mg XL daily to 150mg XL plus an additional 75mg of bupropion daily and let me know if it works out.  Next step could be Wellbutrin 300mg XL daily without any 75mg/d.

## 2025-04-17 LAB — TESTOST SERPL-MCNC: 405 NG/DL (ref 264–916)

## 2025-04-17 NOTE — ASSESSMENT & PLAN NOTE
Continue wellbutrin 150mg XL in am  Add 75mg wellbutrin  Risks with cymbalta aware  Could consider wellbutrin 300mg SL in future    Please stay on the cymbalta 60mg/d but increase the wellbutrin 150mg XL daily to 150mg XL plus an additional 75mg of bupropion daily and let me know if it works out.  Next step could be Wellbutrin 300mg XL daily without any 75mg/d.     Orders:    buPROPion (WELLBUTRIN XL) 150 mg 24 hr tablet; Take 1 tablet (150 mg total) by mouth every morning

## 2025-04-18 ENCOUNTER — RESULTS FOLLOW-UP (OUTPATIENT)
Dept: FAMILY MEDICINE CLINIC | Facility: CLINIC | Age: 30
End: 2025-04-18

## 2025-05-29 DIAGNOSIS — F33.9 RECURRENT DEPRESSION (HCC): ICD-10-CM

## 2025-05-29 NOTE — TELEPHONE ENCOUNTER
Patient calling for refill of his medication.  He stated him and Dr. Dietz have been doing a trial and error with the dosage.   Patient stated that this refill of his wellbutrin will be   Welbutrin 300mg  2 pills daily.    [facility]    Patient only has enough until tomorrow and then he will be completely out.    Please have Dr. Dietz review and change directions on script.

## 2025-05-30 RX ORDER — BUPROPION HYDROCHLORIDE 300 MG/1
300 TABLET ORAL EVERY MORNING
Qty: 90 TABLET | Refills: 1 | Status: SHIPPED | OUTPATIENT
Start: 2025-05-30 | End: 2025-11-26

## 2025-06-04 NOTE — ASSESSMENT & PLAN NOTE
Patient with prior history of C diff  · C diff testing negative    Discontinue oral vancomycin per GI which was started for c diff prophylaxis No

## 2025-07-15 DIAGNOSIS — R19.7 NAUSEA VOMITING AND DIARRHEA: ICD-10-CM

## 2025-07-15 DIAGNOSIS — F41.1 GAD (GENERALIZED ANXIETY DISORDER): ICD-10-CM

## 2025-07-15 DIAGNOSIS — R11.2 NAUSEA VOMITING AND DIARRHEA: ICD-10-CM

## 2025-07-16 RX ORDER — PROCHLORPERAZINE MALEATE 5 MG/1
5 TABLET ORAL EVERY 8 HOURS PRN
Qty: 30 TABLET | Refills: 0 | Status: SHIPPED | OUTPATIENT
Start: 2025-07-16

## 2025-07-17 RX ORDER — DULOXETIN HYDROCHLORIDE 60 MG/1
60 CAPSULE, DELAYED RELEASE ORAL DAILY
Qty: 90 CAPSULE | Refills: 1 | Status: SHIPPED | OUTPATIENT
Start: 2025-07-17

## 2025-07-31 ENCOUNTER — TELEPHONE (OUTPATIENT)
Age: 30
End: 2025-07-31

## 2025-08-02 ENCOUNTER — HOSPITAL ENCOUNTER (EMERGENCY)
Facility: HOSPITAL | Age: 30
Discharge: HOME | End: 2025-08-02
Attending: STUDENT IN AN ORGANIZED HEALTH CARE EDUCATION/TRAINING PROGRAM | Admitting: STUDENT IN AN ORGANIZED HEALTH CARE EDUCATION/TRAINING PROGRAM
Payer: OTHER MISCELLANEOUS

## 2025-08-02 ENCOUNTER — APPOINTMENT (EMERGENCY)
Dept: RADIOLOGY | Facility: HOSPITAL | Age: 30
End: 2025-08-02
Payer: OTHER MISCELLANEOUS

## 2025-08-02 VITALS
RESPIRATION RATE: 20 BRPM | SYSTOLIC BLOOD PRESSURE: 135 MMHG | HEART RATE: 90 BPM | DIASTOLIC BLOOD PRESSURE: 82 MMHG | TEMPERATURE: 99.3 F | OXYGEN SATURATION: 96 %

## 2025-08-02 DIAGNOSIS — R07.81 RIB PAIN: Primary | ICD-10-CM

## 2025-08-02 PROCEDURE — 99284 EMERGENCY DEPT VISIT MOD MDM: CPT

## 2025-08-02 PROCEDURE — 71250 CT THORAX DX C-: CPT

## 2025-08-02 PROCEDURE — 99284 EMERGENCY DEPT VISIT MOD MDM: CPT | Performed by: STUDENT IN AN ORGANIZED HEALTH CARE EDUCATION/TRAINING PROGRAM

## (undated) DEVICE — SOL IRR POUR NS 0.9% 500ML

## (undated) DEVICE — DRAPE 1/2 SHEET 40X57"

## (undated) DEVICE — Device

## (undated) DEVICE — DRAPE TOWEL BLUE 17" X 24"

## (undated) DEVICE — MEDICATION LABELS W MARKER

## (undated) DEVICE — SPECIMEN CONTAINER 100ML

## (undated) DEVICE — SOL IRR POUR H2O 250ML

## (undated) DEVICE — GLV COTTON DEROYAL XL

## (undated) DEVICE — POSITIONER FOAM EGG CRATE ULNAR 2PCS (PINK)

## (undated) DEVICE — WARMING BLANKET UPPER ADULT

## (undated) DEVICE — TUBING SUCTION 20FT

## (undated) DEVICE — PACK BASIN SPECIAL PROCEDURE

## (undated) DEVICE — SUCTION YANKAUER OPEN TIP NO VENT CURVE

## (undated) DEVICE — GOWN TRIMAX XXL

## (undated) DEVICE — DRAPE LEGGINGS XL

## (undated) DEVICE — SUT SOFSILK 2-0 30" TIES

## (undated) DEVICE — PACK LITHOTOMY

## (undated) DEVICE — SUCTION YANKAUER NO CONTROL VENT

## (undated) DEVICE — ELCTR BOVIE PENCIL HANDPIECE ROCKER SWITCH 15FT

## (undated) DEVICE — VISITEC 4X4

## (undated) DEVICE — GLV 8 PROTEXIS (WHITE)

## (undated) DEVICE — VENODYNE/SCD SLEEVE CALF MEDIUM

## (undated) DEVICE — TUBING CAP SET ERBEFLO CLEVERCAP HYBRID CO2 FOR OLYMPUS SCOPES AND UCR

## (undated) DEVICE — GOWN TRIMAX LG

## (undated) DEVICE — VESSEL LOOP MINI-BLUE 0.075" X 16"

## (undated) DEVICE — POSITIONER FOAM HEADREST (PINK)

## (undated) DEVICE — DRAPE MAYO STAND 30"

## (undated) DEVICE — SYR ASEPTO